# Patient Record
Sex: MALE | Race: BLACK OR AFRICAN AMERICAN | NOT HISPANIC OR LATINO | URBAN - METROPOLITAN AREA
[De-identification: names, ages, dates, MRNs, and addresses within clinical notes are randomized per-mention and may not be internally consistent; named-entity substitution may affect disease eponyms.]

---

## 2018-10-03 ENCOUNTER — EMERGENCY (EMERGENCY)
Facility: HOSPITAL | Age: 28
LOS: 1 days | Discharge: HOME | End: 2018-10-03
Attending: STUDENT IN AN ORGANIZED HEALTH CARE EDUCATION/TRAINING PROGRAM
Payer: COMMERCIAL

## 2018-10-03 VITALS
RESPIRATION RATE: 19 BRPM | SYSTOLIC BLOOD PRESSURE: 121 MMHG | TEMPERATURE: 97 F | HEART RATE: 79 BPM | OXYGEN SATURATION: 100 % | DIASTOLIC BLOOD PRESSURE: 74 MMHG

## 2018-10-03 VITALS
WEIGHT: 156.97 LBS | DIASTOLIC BLOOD PRESSURE: 73 MMHG | HEART RATE: 109 BPM | OXYGEN SATURATION: 100 % | HEIGHT: 71 IN | TEMPERATURE: 98 F | RESPIRATION RATE: 16 BRPM | SYSTOLIC BLOOD PRESSURE: 111 MMHG

## 2018-10-03 DIAGNOSIS — Z98.890 OTHER SPECIFIED POSTPROCEDURAL STATES: Chronic | ICD-10-CM

## 2018-10-03 PROBLEM — Z00.00 ENCOUNTER FOR PREVENTIVE HEALTH EXAMINATION: Status: ACTIVE | Noted: 2018-10-03

## 2018-10-03 LAB
HCT VFR BLD CALC: 37.3 % — LOW (ref 42–52)
HGB BLD-MCNC: 11.7 G/DL — LOW (ref 14–18)
MCHC RBC-ENTMCNC: 28.2 PG — SIGNIFICANT CHANGE UP (ref 27–31)
MCHC RBC-ENTMCNC: 31.4 G/DL — LOW (ref 32–36)
MCV RBC AUTO: 89.9 FL — SIGNIFICANT CHANGE UP (ref 80–94)
PLATELET # BLD AUTO: 277 K/UL — SIGNIFICANT CHANGE UP (ref 150–400)
RBC # BLD: 4.15 M/UL — LOW (ref 4.6–6.2)
RBC # FLD: 14.1 % — SIGNIFICANT CHANGE UP (ref 11–15.6)
WBC # BLD: 4.1 K/UL — LOW (ref 4.8–10.8)
WBC # FLD AUTO: 4.1 K/UL — LOW (ref 4.8–10.8)

## 2018-10-03 PROCEDURE — 80048 BASIC METABOLIC PNL TOTAL CA: CPT

## 2018-10-03 PROCEDURE — 96374 THER/PROPH/DIAG INJ IV PUSH: CPT

## 2018-10-03 PROCEDURE — 99284 EMERGENCY DEPT VISIT MOD MDM: CPT

## 2018-10-03 PROCEDURE — 70450 CT HEAD/BRAIN W/O DYE: CPT

## 2018-10-03 PROCEDURE — 70450 CT HEAD/BRAIN W/O DYE: CPT | Mod: 26

## 2018-10-03 PROCEDURE — 96375 TX/PRO/DX INJ NEW DRUG ADDON: CPT

## 2018-10-03 PROCEDURE — 85027 COMPLETE CBC AUTOMATED: CPT

## 2018-10-03 PROCEDURE — 36415 COLL VENOUS BLD VENIPUNCTURE: CPT

## 2018-10-03 PROCEDURE — 99284 EMERGENCY DEPT VISIT MOD MDM: CPT | Mod: 25

## 2018-10-03 RX ORDER — KETOROLAC TROMETHAMINE 30 MG/ML
30 SYRINGE (ML) INJECTION ONCE
Qty: 0 | Refills: 0 | Status: DISCONTINUED | OUTPATIENT
Start: 2018-10-03 | End: 2018-10-03

## 2018-10-03 RX ORDER — SODIUM CHLORIDE 9 MG/ML
1000 INJECTION INTRAMUSCULAR; INTRAVENOUS; SUBCUTANEOUS ONCE
Qty: 0 | Refills: 0 | Status: COMPLETED | OUTPATIENT
Start: 2018-10-03 | End: 2018-10-03

## 2018-10-03 RX ORDER — SODIUM CHLORIDE 9 MG/ML
3 INJECTION INTRAMUSCULAR; INTRAVENOUS; SUBCUTANEOUS ONCE
Qty: 0 | Refills: 0 | Status: COMPLETED | OUTPATIENT
Start: 2018-10-03 | End: 2018-10-03

## 2018-10-03 RX ORDER — METOCLOPRAMIDE HCL 10 MG
10 TABLET ORAL ONCE
Qty: 0 | Refills: 0 | Status: COMPLETED | OUTPATIENT
Start: 2018-10-03 | End: 2018-10-03

## 2018-10-03 RX ORDER — DIPHENHYDRAMINE HCL 50 MG
25 CAPSULE ORAL ONCE
Qty: 0 | Refills: 0 | Status: COMPLETED | OUTPATIENT
Start: 2018-10-03 | End: 2018-10-03

## 2018-10-03 RX ADMIN — Medication 10 MILLIGRAM(S): at 18:45

## 2018-10-03 RX ADMIN — SODIUM CHLORIDE 1000 MILLILITER(S): 9 INJECTION INTRAMUSCULAR; INTRAVENOUS; SUBCUTANEOUS at 18:45

## 2018-10-03 RX ADMIN — Medication 25 MILLIGRAM(S): at 13:05

## 2018-10-03 RX ADMIN — Medication 30 MILLIGRAM(S): at 19:03

## 2018-10-03 RX ADMIN — Medication 2 TABLET(S): at 19:30

## 2018-10-03 RX ADMIN — Medication 30 MILLIGRAM(S): at 13:05

## 2018-10-03 RX ADMIN — SODIUM CHLORIDE 3 MILLILITER(S): 9 INJECTION INTRAMUSCULAR; INTRAVENOUS; SUBCUTANEOUS at 13:05

## 2018-10-03 RX ADMIN — Medication 2 TABLET(S): at 18:45

## 2018-10-03 NOTE — ED ADULT TRIAGE NOTE - CHIEF COMPLAINT QUOTE
pt [place] train [place] reporting headache. Patient reports he fell 2 feet from bed last night. no loc. no anticoags. patient hx of guillan-barre.

## 2018-10-03 NOTE — ED ADULT NURSE REASSESSMENT NOTE - NS ED NURSE REASSESS COMMENT FT1
Assumed patient care from ANDREA Rudolph at 1930, charting as noted. Received patient lying in bed, a&ox3, able to make needs known. Iv fluids infusing well to left forearm iv, patent. Patient denies chest pain. sob, dizziness, headache, fever and chills. Plan of care and wait time explained, patient verbalized understanding. Patient not in respiratory distress. To continue to monitor.

## 2018-10-03 NOTE — ED PROVIDER NOTE - OBJECTIVE STATEMENT
CC PATIENT WITH HEADACHE.   27 YO MALE WITH HIV AND GUILLAIN BARRE HISTORY, FOLLOWED AT [facility], PRESENTS WITH CO HEADACHE. PT STATES HE GOT UP TO GO TO THE BATHROOM AT 2 AM AND FELL BECAUSE HIS LEGS WERE WEAK. HE HIT THE BED MATTRESS AND THEN THE FLOOR. NO LOC, N,V,VISUAL CHANGES. PT STATES HE STAYED AWAKE AND AT 5:30 AM GOT UP TO GET READY FOR WORK. PT STATES THAT ALTHOUGH HE DID NOT HAVE HEADACHE  WHEN HE FELL, HE HAD ONE LATER ON IN THE MORNING. HEADACHE CONSTANT AND FRONTAL. PT HAS NO HX HEADACHE. HE DESCRIBES PAIN AS"AN ICEPACK ON MY FOREHEAD" HE STATES HE WAS ON THE TRAIN AND "PASSED OUT" HE STATE "THE NATALIE NEXT TO ME THOUGHT I FELL ASLEEP BACUSE I JUST STARTED LEANING TOWARD THE SIDE" NO OTHER COMPLAINTS  MED HX HIV (human immunodeficiency virus infection)  from birth

## 2018-10-03 NOTE — ED PROVIDER NOTE - PROGRESS NOTE DETAILS
PT HAS HEADACHE WILL MEDICATE AND CHECK LABS. NEUROLOGICALLY INTACT.  CT NEGATIVE. WILL REEVALUATE PT MEDICATED. WILL REASSESS. WILL FOLLOW UP LABS PT seen and reassessed.  Patient symptomatically improved.   AAOX3, NAD, VSS.  Discussed test results w/ patient, given copy of results. Patient verbalized understanding of hospital course and outpatient plans, has decisional making capacity.  Will follow-up with Primary care doctor in the next 2 days; patient will call for an appointment. Will return to the ED if there is any worsening of symptoms.  Patient able to ambulate w/o difficulty, is tolerating PO intake

## 2018-10-03 NOTE — ED PROVIDER NOTE - PRINCIPAL DIAGNOSIS
Acute nonintractable headache, unspecified headache type Migraine without aura and without status migrainosus, not intractable

## 2018-10-03 NOTE — ED ADULT NURSE NOTE - OBJECTIVE STATEMENT
28 year old male in noacute distress, alert and oriented x 4 c/o headache upon waking up this morning. Pt reports falling onto his mattress last night hitting the back of his head. Pt states "It feels like a migraine", denies history of migraine, denies taking any medication for pain this morning.

## 2018-10-03 NOTE — ED PROVIDER NOTE - SHIFT CHANGE DETAILS
PT WITH HA MOST LIKELY MIGRAINE. ADMITTED TO [facility].  OFFERED LP AND ADAMANTLY REFUSED.  REASSESS AFTER IVF AND MEDS. SCRIPT SENT TO HIS PHARMACY. NEEDS WORK NOTE,

## 2018-10-03 NOTE — ED PROVIDER NOTE - CHPI ED SYMPTOMS NEG
no loss of consciousness/no nausea/no numbness/no blurred vision/no confusion/no dizziness/no fever/no change in level of consciousness/no vomiting

## 2018-10-03 NOTE — ED PROVIDER NOTE - CARE PLAN
Principal Discharge DX:	Acute nonintractable headache, unspecified headache type Principal Discharge DX:	Migraine without aura and without status migrainosus, not intractable

## 2018-10-03 NOTE — ED ADULT NURSE NOTE - NSIMPLEMENTINTERV_GEN_ALL_ED
Implemented All Fall Risk Interventions:  [place] to call system. Call bell, personal items and telephone within reach. Instruct patient to call for assistance. Room bathroom lighting operational. Non-slip footwear when patient is off stretcher. Physically safe environment: no spills, clutter or unnecessary equipment. Stretcher in lowest position, wheels locked, appropriate side rails in place. Provide visual cue, wrist band, yellow gown, etc. Monitor gait and stability. Monitor for mental status changes and reorient to person, place, and time. Review medications for side effects contributing to fall risk. Reinforce activity limits and safety measures with patient and family.

## 2018-10-03 NOTE — ED PROVIDER NOTE - MEDICAL DECISION MAKING DETAILS
PT WITH HEADACHE AFTER FALL WITHOUT LOC N V OR VISUAL CHANGES  EXAM NON FOCAL AND CT NEGATIVE  CHECKING LABS, WILL REASSESS

## 2018-10-12 ENCOUNTER — EMERGENCY (EMERGENCY)
Facility: HOSPITAL | Age: 28
LOS: 1 days | Discharge: HOME | End: 2018-10-12
Attending: EMERGENCY MEDICINE
Payer: COMMERCIAL

## 2018-10-12 VITALS
TEMPERATURE: 98 F | WEIGHT: 149.91 LBS | SYSTOLIC BLOOD PRESSURE: 129 MMHG | DIASTOLIC BLOOD PRESSURE: 87 MMHG | HEART RATE: 76 BPM | RESPIRATION RATE: 16 BRPM | OXYGEN SATURATION: 99 % | HEIGHT: 68 IN

## 2018-10-12 DIAGNOSIS — Z98.890 OTHER SPECIFIED POSTPROCEDURAL STATES: Chronic | ICD-10-CM

## 2018-10-12 PROCEDURE — 99285 EMERGENCY DEPT VISIT HI MDM: CPT | Mod: 25

## 2018-10-12 PROCEDURE — 93010 ELECTROCARDIOGRAM REPORT: CPT

## 2018-10-12 NOTE — ED ADULT TRIAGE NOTE - CHIEF COMPLAINT QUOTE
Pt picked up from train station with hx of Guillain [place] syndrome presents with c/o 6/10 sudden onset chest tightness that pt describes as "it feels like something is compressing my chest". Pain is nonradiaiting but constant.

## 2018-10-13 ENCOUNTER — EMERGENCY (EMERGENCY)
Facility: HOSPITAL | Age: 28
LOS: 0 days | Discharge: HOME | End: 2018-10-14
Attending: EMERGENCY MEDICINE | Admitting: EMERGENCY MEDICINE
Payer: MEDICAID

## 2018-10-13 VITALS
OXYGEN SATURATION: 100 % | HEART RATE: 81 BPM | TEMPERATURE: 98 F | WEIGHT: 156.97 LBS | DIASTOLIC BLOOD PRESSURE: 81 MMHG | HEIGHT: 71 IN | SYSTOLIC BLOOD PRESSURE: 130 MMHG | RESPIRATION RATE: 20 BRPM

## 2018-10-13 VITALS
HEART RATE: 60 BPM | RESPIRATION RATE: 18 BRPM | SYSTOLIC BLOOD PRESSURE: 123 MMHG | TEMPERATURE: 99 F | DIASTOLIC BLOOD PRESSURE: 68 MMHG | OXYGEN SATURATION: 98 %

## 2018-10-13 DIAGNOSIS — R51 HEADACHE: ICD-10-CM

## 2018-10-13 DIAGNOSIS — Z21 ASYMPTOMATIC HUMAN IMMUNODEFICIENCY VIRUS [HIV] INFECTION STATUS: ICD-10-CM

## 2018-10-13 DIAGNOSIS — G61.0 GUILLAIN-BARRE SYNDROME: ICD-10-CM

## 2018-10-13 DIAGNOSIS — Z98.890 OTHER SPECIFIED POSTPROCEDURAL STATES: Chronic | ICD-10-CM

## 2018-10-13 PROBLEM — B20 HUMAN IMMUNODEFICIENCY VIRUS [HIV] DISEASE: Chronic | Status: ACTIVE | Noted: 2018-10-03

## 2018-10-13 LAB
ALBUMIN SERPL ELPH-MCNC: 3.9 G/DL — SIGNIFICANT CHANGE UP (ref 3.3–5.2)
ALP SERPL-CCNC: 74 U/L — SIGNIFICANT CHANGE UP (ref 40–120)
ALT FLD-CCNC: 11 U/L — SIGNIFICANT CHANGE UP
ANION GAP SERPL CALC-SCNC: 14 MMOL/L — SIGNIFICANT CHANGE UP (ref 5–17)
ANISOCYTOSIS BLD QL: SLIGHT — SIGNIFICANT CHANGE UP
AST SERPL-CCNC: 23 U/L — SIGNIFICANT CHANGE UP
BILIRUB SERPL-MCNC: 0.3 MG/DL — LOW (ref 0.4–2)
BUN SERPL-MCNC: 14 MG/DL — SIGNIFICANT CHANGE UP (ref 8–20)
CALCIUM SERPL-MCNC: 8.8 MG/DL — SIGNIFICANT CHANGE UP (ref 8.6–10.2)
CHLORIDE SERPL-SCNC: 99 MMOL/L — SIGNIFICANT CHANGE UP (ref 98–107)
CO2 SERPL-SCNC: 24 MMOL/L — SIGNIFICANT CHANGE UP (ref 22–29)
CREAT SERPL-MCNC: 0.65 MG/DL — SIGNIFICANT CHANGE UP (ref 0.5–1.3)
D DIMER BLD IA.RAPID-MCNC: 166 NG/ML DDU — SIGNIFICANT CHANGE UP
ELLIPTOCYTES BLD QL SMEAR: SLIGHT — SIGNIFICANT CHANGE UP
EOSINOPHIL NFR BLD AUTO: 2 % — SIGNIFICANT CHANGE UP (ref 0–6)
GLUCOSE SERPL-MCNC: 101 MG/DL — SIGNIFICANT CHANGE UP (ref 70–115)
HCT VFR BLD CALC: 36.1 % — LOW (ref 42–52)
HGB BLD-MCNC: 11.6 G/DL — LOW (ref 14–18)
HYPOCHROMIA BLD QL: SLIGHT — SIGNIFICANT CHANGE UP
LYMPHOCYTES # BLD AUTO: 59 % — HIGH (ref 20–55)
MACROCYTES BLD QL: SLIGHT — SIGNIFICANT CHANGE UP
MCHC RBC-ENTMCNC: 29.1 PG — SIGNIFICANT CHANGE UP (ref 27–31)
MCHC RBC-ENTMCNC: 32.1 G/DL — SIGNIFICANT CHANGE UP (ref 32–36)
MCV RBC AUTO: 90.5 FL — SIGNIFICANT CHANGE UP (ref 80–94)
MICROCYTES BLD QL: SLIGHT — SIGNIFICANT CHANGE UP
MONOCYTES NFR BLD AUTO: 10 % — SIGNIFICANT CHANGE UP (ref 3–10)
NEUTROPHILS NFR BLD AUTO: 27 % — LOW (ref 37–73)
PLAT MORPH BLD: NORMAL — SIGNIFICANT CHANGE UP
PLATELET # BLD AUTO: 212 K/UL — SIGNIFICANT CHANGE UP (ref 150–400)
POIKILOCYTOSIS BLD QL AUTO: SLIGHT — SIGNIFICANT CHANGE UP
POLYCHROMASIA BLD QL SMEAR: SLIGHT — SIGNIFICANT CHANGE UP
POTASSIUM SERPL-MCNC: 3.6 MMOL/L — SIGNIFICANT CHANGE UP (ref 3.5–5.3)
POTASSIUM SERPL-SCNC: 3.6 MMOL/L — SIGNIFICANT CHANGE UP (ref 3.5–5.3)
PROT SERPL-MCNC: 8.4 G/DL — SIGNIFICANT CHANGE UP (ref 6.6–8.7)
RBC # BLD: 3.99 M/UL — LOW (ref 4.6–6.2)
RBC # FLD: 13.7 % — SIGNIFICANT CHANGE UP (ref 11–15.6)
RBC BLD AUTO: ABNORMAL
SODIUM SERPL-SCNC: 137 MMOL/L — SIGNIFICANT CHANGE UP (ref 135–145)
TROPONIN T SERPL-MCNC: <0.01 NG/ML — SIGNIFICANT CHANGE UP (ref 0–0.06)
TROPONIN T SERPL-MCNC: <0.01 NG/ML — SIGNIFICANT CHANGE UP (ref 0–0.06)
VARIANT LYMPHS # BLD: 2 % — SIGNIFICANT CHANGE UP (ref 0–6)
WBC # BLD: 3.1 K/UL — LOW (ref 4.8–10.8)
WBC # FLD AUTO: 3.1 K/UL — LOW (ref 4.8–10.8)

## 2018-10-13 PROCEDURE — 80053 COMPREHEN METABOLIC PANEL: CPT

## 2018-10-13 PROCEDURE — 71046 X-RAY EXAM CHEST 2 VIEWS: CPT | Mod: 26

## 2018-10-13 PROCEDURE — 85027 COMPLETE CBC AUTOMATED: CPT

## 2018-10-13 PROCEDURE — 99053 MED SERV 10PM-8AM 24 HR FAC: CPT

## 2018-10-13 PROCEDURE — 93005 ELECTROCARDIOGRAM TRACING: CPT

## 2018-10-13 PROCEDURE — 85379 FIBRIN DEGRADATION QUANT: CPT

## 2018-10-13 PROCEDURE — 99285 EMERGENCY DEPT VISIT HI MDM: CPT | Mod: 25

## 2018-10-13 PROCEDURE — 71046 X-RAY EXAM CHEST 2 VIEWS: CPT

## 2018-10-13 PROCEDURE — 36415 COLL VENOUS BLD VENIPUNCTURE: CPT

## 2018-10-13 PROCEDURE — 84484 ASSAY OF TROPONIN QUANT: CPT

## 2018-10-13 PROCEDURE — 99284 EMERGENCY DEPT VISIT MOD MDM: CPT | Mod: 25

## 2018-10-13 RX ORDER — METOCLOPRAMIDE HCL 10 MG
10 TABLET ORAL ONCE
Qty: 0 | Refills: 0 | Status: COMPLETED | OUTPATIENT
Start: 2018-10-13 | End: 2018-10-13

## 2018-10-13 RX ORDER — MULTIVIT WITH MIN/MFOLATE/K2 340-15/3 G
0 POWDER (GRAM) ORAL
Qty: 0 | Refills: 0 | COMMUNITY

## 2018-10-13 RX ORDER — ASPIRIN/CALCIUM CARB/MAGNESIUM 324 MG
162 TABLET ORAL ONCE
Qty: 0 | Refills: 0 | Status: COMPLETED | OUTPATIENT
Start: 2018-10-13 | End: 2018-10-13

## 2018-10-13 RX ORDER — POTASSIUM CHLORIDE 20 MEQ
0 PACKET (EA) ORAL
Qty: 0 | Refills: 0 | COMMUNITY

## 2018-10-13 RX ORDER — DIPHENHYDRAMINE HCL 50 MG
25 CAPSULE ORAL ONCE
Qty: 0 | Refills: 0 | Status: COMPLETED | OUTPATIENT
Start: 2018-10-13 | End: 2018-10-13

## 2018-10-13 RX ORDER — GABAPENTIN 400 MG/1
600 CAPSULE ORAL ONCE
Qty: 0 | Refills: 0 | Status: COMPLETED | OUTPATIENT
Start: 2018-10-13 | End: 2018-10-13

## 2018-10-13 RX ORDER — ACETAMINOPHEN 500 MG
1000 TABLET ORAL ONCE
Qty: 0 | Refills: 0 | Status: COMPLETED | OUTPATIENT
Start: 2018-10-13 | End: 2018-10-13

## 2018-10-13 RX ORDER — SODIUM CHLORIDE 9 MG/ML
2000 INJECTION INTRAMUSCULAR; INTRAVENOUS; SUBCUTANEOUS ONCE
Qty: 0 | Refills: 0 | Status: COMPLETED | OUTPATIENT
Start: 2018-10-13 | End: 2018-10-13

## 2018-10-13 RX ADMIN — Medication 162 MILLIGRAM(S): at 06:00

## 2018-10-13 RX ADMIN — Medication 10 MILLIGRAM(S): at 22:26

## 2018-10-13 RX ADMIN — Medication 1000 MILLIGRAM(S): at 22:45

## 2018-10-13 RX ADMIN — SODIUM CHLORIDE 1000 MILLILITER(S): 9 INJECTION INTRAMUSCULAR; INTRAVENOUS; SUBCUTANEOUS at 22:26

## 2018-10-13 RX ADMIN — Medication 25 MILLIGRAM(S): at 22:26

## 2018-10-13 RX ADMIN — Medication 1000 MILLIGRAM(S): at 23:00

## 2018-10-13 RX ADMIN — Medication 400 MILLIGRAM(S): at 22:27

## 2018-10-13 RX ADMIN — GABAPENTIN 600 MILLIGRAM(S): 400 CAPSULE ORAL at 06:00

## 2018-10-13 NOTE — ED PROVIDER NOTE - OBJECTIVE STATEMENT
27 y/o M with PMHx of Guillain [place] (on 400 mg of Gabapentin) and HIV presents to ED c/o chest pain described as a pressure onset today. The pain is constant, rates the pain as a 6/10 and has pain with deep inspiration. Is also experiencing bilateral leg pain, which is chronic but has worsened in intensity. He also notes having a headache that has been constant since his lumbar puncture 2 weeks ago. Denies fevers, chills, cough, abdominal pain, nausea, vomiting, shortness of breath, diaphoresis or back pain. Was in the ED several weeks ago but never followed up outpatient as he was instructed to do. Did not take aspirin PTA. No further acute complaints at this time.

## 2018-10-13 NOTE — ED PROVIDER NOTE - PROGRESS NOTE DETAILS
JG:  Received signout to discharge patient when he is awake and if feeling better.  Pt. to f/u with [facility] as this was the service who performed LP.

## 2018-10-13 NOTE — ED PROVIDER NOTE - CHPI ED SYMPTOMS NEG
no vomiting/no chills/no back pain/no cough/no fever/no nausea/abdominal pain/no shortness of breath/no diaphoresis

## 2018-10-13 NOTE — ED ADULT NURSE NOTE - CHPI ED NUR SYMPTOMS NEG
no shortness of breath/no diaphoresis/no dizziness/no chills/no fever/no congestion/no nausea/no vomiting/no syncope/no back pain

## 2018-10-13 NOTE — ED PROVIDER NOTE - PLAN OF CARE
1. Return to ED for worsening, progressive or any other concerning symptoms   2. Follow up with your primary care doctor in 2-3days   3. Follow up with [facility]

## 2018-10-13 NOTE — ED ADULT NURSE NOTE - OBJECTIVE STATEMENT
Patient present with headache X2 weeks. Patient had lumbar puncture for hx gullain barre syndrome and states he began experiencing these headaches 1 day post op. Patient rates pain 9/10.

## 2018-10-13 NOTE — ED ADULT NURSE REASSESSMENT NOTE - NS ED NURSE REASSESS COMMENT FT1
Pt requesting medicate taxi , transportation arranged by beaver clerk , pt instructed to wait in the waiting room for , pt ambulating well in ed , no distress noted, no further complaints of chest pain
pt resting in bed, states feels some relief of pain. pt pending rpt trop. updated on plan of care, verbalizes understanding. call bell in reach
Pt received in the stretcher resting comfortably, non distress noted, no chest pain reported, awaiting further dispo

## 2018-10-13 NOTE — ED PROVIDER NOTE - NSFOLLOWUPINSTRUCTIONS_ED_ALL_ED_FT
1. Return to ED for worsening, progressive or any other concerning symptoms   2. Follow up with your primary care doctor in 2-3days   3. Follow up with [facility]  [facility]  Cardiology  [address] [place] [address], [place] [address]  Phone: [phone]  Fax:

## 2018-10-13 NOTE — ED PROVIDER NOTE - MEDICAL DECISION MAKING DETAILS
29 y/o M with PMHx of Guillain [place] (on 400 mg of Gabapentin) and HIV presents to ED c/o chest pain described as a pressure onset today. 29 y/o M with PMHx of Guillain [place] (on 400 mg of Gabapentin) and HIV presents to ED c/o chest pain described as a pressure onset today - neg trop x2, pt low risk for MACE - follow up with PMD and cards

## 2018-10-13 NOTE — ED PROVIDER NOTE - CARE PLAN
Principal Discharge DX:	Chest pain in adult  Assessment and plan of treatment:	1. Return to ED for worsening, progressive or any other concerning symptoms   2. Follow up with your primary care doctor in 2-3days   3. Follow up with [facility]

## 2018-10-13 NOTE — ED ADULT NURSE NOTE - CHIEF COMPLAINT QUOTE
Pt [place] from train station with hx of Guillain [place] syndrome presents with c/o 6/10 sudden onset chest tightness that pt describes as "it feels like something is compressing my chest". Pain is nonradiaiting but constant.

## 2018-10-13 NOTE — ED PROVIDER NOTE - PROGRESS NOTE DETAILS
Pt signed out as nonspecific chest pain, no cardiac risk factors. EKG wnl, trop x2 negative. will d/c -Slowey DO

## 2018-10-13 NOTE — ED ADULT NURSE REASSESSMENT NOTE - COMFORT CARE
repositioned/plan of care explained/treatment delay explained/wait time explained/warm blanket provided/side rails down

## 2018-10-13 NOTE — ED PROVIDER NOTE - NSFOLLOWUPCLINICS_GEN_ALL_ED_FT
[facility]  Cardiology  [address] [place] [address]  Phone: [phone]  Fax:   Follow Up Time:

## 2018-10-13 NOTE — ED ADULT NURSE NOTE - OBJECTIVE STATEMENT
29yo male c/o left sided "chest pressure" that started around 930pm 10/12/18 when he was on train. pt denies radiation of pain, numbness, tingling, sob, dyspnea, respiratory distress. pt appears comfortable, asleep in bed, easily arrousable. pt states he is hiv + and has guillian barre.

## 2018-10-13 NOTE — ED PROVIDER NOTE - OBJECTIVE STATEMENT
29 y/o M with recent dx of Guillain-[place]enn[place] Syndrome presenting to the ED c/o frontal HA x2 weeks. Pt had a Lumbar Puncture performed two weeks ago at [place], dx with Guillain-Jennings syndrome. Started on Gabapentin. Since LP was performed, he has been experiencing a HA. States that he stayed overnight at [facility] one week ago, informed he may need a blood patch. Pt was also reevaluated at [place] and [place] last week. In ED today, endorses improving, mild residual LE weakness secondary to Guillain-Jennings. Denies any CP, abd pain, N/V/D, fevers, or chills. Pt has follow up with Neurology in two weeks. Allergic to Ceclor.

## 2018-10-14 VITALS
HEART RATE: 58 BPM | DIASTOLIC BLOOD PRESSURE: 98 MMHG | SYSTOLIC BLOOD PRESSURE: 126 MMHG | RESPIRATION RATE: 18 BRPM | OXYGEN SATURATION: 96 %

## 2018-10-15 ENCOUNTER — EMERGENCY (EMERGENCY)
Facility: HOSPITAL | Age: 28
LOS: 1 days | Discharge: HOME | End: 2018-10-15
Attending: STUDENT IN AN ORGANIZED HEALTH CARE EDUCATION/TRAINING PROGRAM
Payer: COMMERCIAL

## 2018-10-15 VITALS
RESPIRATION RATE: 18 BRPM | DIASTOLIC BLOOD PRESSURE: 83 MMHG | OXYGEN SATURATION: 98 % | HEIGHT: 70 IN | WEIGHT: 160.06 LBS | SYSTOLIC BLOOD PRESSURE: 137 MMHG | HEART RATE: 96 BPM | TEMPERATURE: 98 F

## 2018-10-15 DIAGNOSIS — Z98.890 OTHER SPECIFIED POSTPROCEDURAL STATES: Chronic | ICD-10-CM

## 2018-10-15 LAB
ALBUMIN SERPL ELPH-MCNC: 3.4 G/DL — SIGNIFICANT CHANGE UP (ref 3.3–5.2)
ALP SERPL-CCNC: 65 U/L — SIGNIFICANT CHANGE UP (ref 40–120)
ALT FLD-CCNC: 13 U/L — SIGNIFICANT CHANGE UP
ANION GAP SERPL CALC-SCNC: 11 MMOL/L — SIGNIFICANT CHANGE UP (ref 5–17)
AST SERPL-CCNC: 24 U/L — SIGNIFICANT CHANGE UP
BILIRUB SERPL-MCNC: <0.2 MG/DL — LOW (ref 0.4–2)
BUN SERPL-MCNC: 11 MG/DL — SIGNIFICANT CHANGE UP (ref 8–20)
CALCIUM SERPL-MCNC: 8.4 MG/DL — LOW (ref 8.6–10.2)
CHLORIDE SERPL-SCNC: 109 MMOL/L — HIGH (ref 98–107)
CO2 SERPL-SCNC: 24 MMOL/L — SIGNIFICANT CHANGE UP (ref 22–29)
CREAT SERPL-MCNC: 1.09 MG/DL — SIGNIFICANT CHANGE UP (ref 0.5–1.3)
D DIMER BLD IA.RAPID-MCNC: <150 NG/ML DDU — SIGNIFICANT CHANGE UP
GLUCOSE SERPL-MCNC: 108 MG/DL — SIGNIFICANT CHANGE UP (ref 70–115)
HCT VFR BLD CALC: 34.1 % — LOW (ref 42–52)
HGB BLD-MCNC: 10.8 G/DL — LOW (ref 14–18)
LIDOCAIN IGE QN: 32 U/L — SIGNIFICANT CHANGE UP (ref 22–51)
MCHC RBC-ENTMCNC: 29.2 PG — SIGNIFICANT CHANGE UP (ref 27–31)
MCHC RBC-ENTMCNC: 31.7 G/DL — LOW (ref 32–36)
MCV RBC AUTO: 92.2 FL — SIGNIFICANT CHANGE UP (ref 80–94)
PLATELET # BLD AUTO: 191 K/UL — SIGNIFICANT CHANGE UP (ref 150–400)
POTASSIUM SERPL-MCNC: 3.7 MMOL/L — SIGNIFICANT CHANGE UP (ref 3.5–5.3)
POTASSIUM SERPL-SCNC: 3.7 MMOL/L — SIGNIFICANT CHANGE UP (ref 3.5–5.3)
PROT SERPL-MCNC: 7.7 G/DL — SIGNIFICANT CHANGE UP (ref 6.6–8.7)
RBC # BLD: 3.7 M/UL — LOW (ref 4.6–6.2)
RBC # FLD: 13.9 % — SIGNIFICANT CHANGE UP (ref 11–15.6)
SODIUM SERPL-SCNC: 144 MMOL/L — SIGNIFICANT CHANGE UP (ref 135–145)
TROPONIN T SERPL-MCNC: <0.01 NG/ML — SIGNIFICANT CHANGE UP (ref 0–0.06)
WBC # BLD: 3 K/UL — LOW (ref 4.8–10.8)
WBC # FLD AUTO: 3 K/UL — LOW (ref 4.8–10.8)

## 2018-10-15 PROCEDURE — 84484 ASSAY OF TROPONIN QUANT: CPT

## 2018-10-15 PROCEDURE — 36415 COLL VENOUS BLD VENIPUNCTURE: CPT

## 2018-10-15 PROCEDURE — 85379 FIBRIN DEGRADATION QUANT: CPT

## 2018-10-15 PROCEDURE — 85027 COMPLETE CBC AUTOMATED: CPT

## 2018-10-15 PROCEDURE — 83690 ASSAY OF LIPASE: CPT

## 2018-10-15 PROCEDURE — 99284 EMERGENCY DEPT VISIT MOD MDM: CPT | Mod: 25

## 2018-10-15 PROCEDURE — 80053 COMPREHEN METABOLIC PANEL: CPT

## 2018-10-15 RX ORDER — IBUPROFEN 200 MG
600 TABLET ORAL ONCE
Qty: 0 | Refills: 0 | Status: COMPLETED | OUTPATIENT
Start: 2018-10-15 | End: 2018-10-15

## 2018-10-15 RX ADMIN — Medication 600 MILLIGRAM(S): at 03:35

## 2018-10-15 NOTE — ED PROVIDER NOTE - CARDIAC, MLM
Diffuse Chest wall tenderness that may exacerbate his pain. Normal rate, regular rhythm.  Heart sounds S1, S2.  No murmurs, rubs or gallops. Normal rate, regular rhythm.  Heart sounds S1, S2.  No murmurs, rubs or gallops. Diffuse chest wall tenderness which reproduces pain.

## 2018-10-15 NOTE — ED PROVIDER NOTE - MEDICAL DECISION MAKING DETAILS
Will evaluate pt for chest pain but given pleuritic nature will obtain labs, and re-evaluate. Will evaluate pt for chest pain, likely MSK, but given pleuritic nature will obtain labs, and re-evaluate.

## 2018-10-15 NOTE — ED ADULT NURSE NOTE - OBJECTIVE STATEMENT
Pt c/o left sided CP starting at 1030 pm, denies radiation of pain, denies n/v, resp even and unlabored, hx of HIV and GB, AOx4, denies cardiac hx, states he was just here in the ED for his legs

## 2018-10-15 NOTE — ED ADULT NURSE NOTE - NSIMPLEMENTINTERV_GEN_ALL_ED
Implemented All Universal Safety Interventions:  [place] to call system. Call bell, personal items and telephone within reach. Instruct patient to call for assistance. Room bathroom lighting operational. Non-slip footwear when patient is off stretcher. Physically safe environment: no spills, clutter or unnecessary equipment. Stretcher in lowest position, wheels locked, appropriate side rails in place.

## 2018-10-15 NOTE — ED PROVIDER NOTE - PROGRESS NOTE DETAILS
Patient feeling better. labs are as noted. Patient with musculoskeletal pain. Patient feeling better. labs are as noted and similar to previous hospital visit. Patient with musculoskeletal pain.

## 2018-10-15 NOTE — ED PROVIDER NOTE - OBJECTIVE STATEMENT
29 y/o M pt presents to the ED with hx of HIV c/o of upper CP. Pt reports the pain began on the train at 10:30am after work. He describes the CP as someone sitting on his chest. The pain worsens with deep breaths and movement. Pt reports pain like this before when he has had PNA, reports sneezing, and diarrhea. Denies nausea and vomiting. No further acute complaints at this time. 29 y/o M pt presents to the ED with hx of HIV c/o of upper CP. Pt reports the pain began on the train at 10:30am after work. He describes the CP as someone sitting on his chest. The pain worsens with deep breaths and movement. Pt reports pain like this before when he has had PNA, reports sneezing, and diarrhea. Pt is a current smoker who smokes 4-5 cigarettes a day. Denies nausea, vomiting, and FHx of heart disease.  No further acute complaints at this time. 27 y/o M pt presents to the ED with PMHx of HIV and Guillain [place] c/o upper chest pain, described as a heaviness onset 1030 yesterday, while at rest on the train home from work. He is also experiencing sneezing and diarrhea. Notes the pain is worse with deep breaths and movements. He notes having pain similar to this in the past and was diagnosed with pneumonia. Admits to smoking (about 4-5 cigarettes per day). Denies fevers, chills, nausea, vomiting, shortness of breath, diaphoresis, personal hx of heart disease or family hx of cardiac problems. Was evaluated in the ED a few days ago, for similar symptoms but was discharged home after a negative workup, instructed to have cardiology follow-up outpatient. No further acute complaints at this time.

## 2018-11-01 ENCOUNTER — EMERGENCY (EMERGENCY)
Facility: HOSPITAL | Age: 28
LOS: 1 days | Discharge: HOME | End: 2018-11-01
Attending: STUDENT IN AN ORGANIZED HEALTH CARE EDUCATION/TRAINING PROGRAM
Payer: COMMERCIAL

## 2018-11-01 ENCOUNTER — EMERGENCY (EMERGENCY)
Facility: HOSPITAL | Age: 28
LOS: 0 days | Discharge: HOME | End: 2018-11-01
Attending: EMERGENCY MEDICINE | Admitting: EMERGENCY MEDICINE
Payer: MEDICAID

## 2018-11-01 VITALS
TEMPERATURE: 98 F | SYSTOLIC BLOOD PRESSURE: 115 MMHG | HEART RATE: 89 BPM | OXYGEN SATURATION: 98 % | RESPIRATION RATE: 18 BRPM | DIASTOLIC BLOOD PRESSURE: 79 MMHG | HEIGHT: 71 IN | WEIGHT: 164.91 LBS

## 2018-11-01 VITALS
HEART RATE: 72 BPM | RESPIRATION RATE: 18 BRPM | OXYGEN SATURATION: 100 % | TEMPERATURE: 98 F | DIASTOLIC BLOOD PRESSURE: 81 MMHG | SYSTOLIC BLOOD PRESSURE: 130 MMHG

## 2018-11-01 VITALS
HEART RATE: 80 BPM | WEIGHT: 160.06 LBS | DIASTOLIC BLOOD PRESSURE: 74 MMHG | OXYGEN SATURATION: 100 % | TEMPERATURE: 98 F | RESPIRATION RATE: 22 BRPM | SYSTOLIC BLOOD PRESSURE: 130 MMHG | HEIGHT: 70 IN

## 2018-11-01 DIAGNOSIS — Z98.890 OTHER SPECIFIED POSTPROCEDURAL STATES: Chronic | ICD-10-CM

## 2018-11-01 PROCEDURE — 99283 EMERGENCY DEPT VISIT LOW MDM: CPT | Mod: 25

## 2018-11-01 PROCEDURE — 71046 X-RAY EXAM CHEST 2 VIEWS: CPT | Mod: 26

## 2018-11-01 PROCEDURE — 99284 EMERGENCY DEPT VISIT MOD MDM: CPT | Mod: 25

## 2018-11-01 PROCEDURE — 70450 CT HEAD/BRAIN W/O DYE: CPT | Mod: 26

## 2018-11-01 RX ORDER — KETOROLAC TROMETHAMINE 30 MG/ML
30 SYRINGE (ML) INJECTION ONCE
Qty: 0 | Refills: 0 | Status: DISCONTINUED | OUTPATIENT
Start: 2018-11-01 | End: 2018-11-01

## 2018-11-01 RX ADMIN — Medication 30 MILLIGRAM(S): at 03:26

## 2018-11-01 RX ADMIN — Medication 30 MILLIGRAM(S): at 04:03
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
## 2018-11-01 NOTE — ED ADULT TRIAGE NOTE - CHIEF COMPLAINT QUOTE
recently got treated for pneumonia , my right ear hurts I feel like the glands in my neck are swollen , I feel like I cant breathe  I feel dizzy my stomach hurts and I had diarrhea x 2 weeks, wet cough noted upon arrival
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
## 2018-11-01 NOTE — ED ADULT NURSE NOTE - NSIMPLEMENTINTERV_GEN_ALL_ED
Implemented All Universal Safety Interventions:  [place] to call system. Call bell, personal items and telephone within reach. Instruct patient to call for assistance. Room bathroom lighting operational. Non-slip footwear when patient is off stretcher. Physically safe environment: no spills, clutter or unnecessary equipment. Stretcher in lowest position, wheels locked, appropriate side rails in place.

## 2018-11-01 NOTE — ED PROVIDER NOTE - PMH
Nini BarrSANGITA© syndrome    Guillain-[place]    HIV (human immunodeficiency virus infection)  from birth

## 2018-11-01 NOTE — ED PROVIDER NOTE - MEDICAL DECISION MAKING DETAILS
chronic migraine, worsened by an injury tonight.  will Ct.  chronic cough, will xray in light of pt HIV status.

## 2018-11-01 NOTE — ED ADULT NURSE NOTE - PMH
Nini BarrSANGITA© syndrome    Guillain-[place]    HIV (human immunodeficiency virus infection)  from birth

## 2018-11-02 VITALS
DIASTOLIC BLOOD PRESSURE: 86 MMHG | HEART RATE: 79 BPM | OXYGEN SATURATION: 98 % | RESPIRATION RATE: 18 BRPM | SYSTOLIC BLOOD PRESSURE: 133 MMHG | TEMPERATURE: 97 F

## 2018-11-02 DIAGNOSIS — B20 HUMAN IMMUNODEFICIENCY VIRUS [HIV] DISEASE: ICD-10-CM

## 2018-11-02 DIAGNOSIS — G43.909 MIGRAINE, UNSPECIFIED, NOT INTRACTABLE, WITHOUT STATUS MIGRAINOSUS: ICD-10-CM

## 2018-11-02 DIAGNOSIS — G61.0 GUILLAIN-BARRE SYNDROME: ICD-10-CM

## 2018-11-02 DIAGNOSIS — R51 HEADACHE: ICD-10-CM

## 2018-11-02 LAB
ALBUMIN SERPL ELPH-MCNC: 3.4 G/DL — SIGNIFICANT CHANGE UP (ref 3.3–5.2)
ALP SERPL-CCNC: 69 U/L — SIGNIFICANT CHANGE UP (ref 40–120)
ALT FLD-CCNC: 16 U/L — SIGNIFICANT CHANGE UP
ANION GAP SERPL CALC-SCNC: 11 MMOL/L — SIGNIFICANT CHANGE UP (ref 5–17)
ANISOCYTOSIS BLD QL: SLIGHT — SIGNIFICANT CHANGE UP
AST SERPL-CCNC: 26 U/L — SIGNIFICANT CHANGE UP
BASOPHILS # BLD AUTO: 0 K/UL — SIGNIFICANT CHANGE UP (ref 0–0.2)
BASOPHILS NFR BLD AUTO: 0 % — SIGNIFICANT CHANGE UP (ref 0–2)
BILIRUB SERPL-MCNC: 0.3 MG/DL — LOW (ref 0.4–2)
BUN SERPL-MCNC: 13 MG/DL — SIGNIFICANT CHANGE UP (ref 8–20)
CALCIUM SERPL-MCNC: 8.7 MG/DL — SIGNIFICANT CHANGE UP (ref 8.6–10.2)
CHLORIDE SERPL-SCNC: 103 MMOL/L — SIGNIFICANT CHANGE UP (ref 98–107)
CO2 SERPL-SCNC: 23 MMOL/L — SIGNIFICANT CHANGE UP (ref 22–29)
CREAT SERPL-MCNC: 0.69 MG/DL — SIGNIFICANT CHANGE UP (ref 0.5–1.3)
DACRYOCYTES BLD QL SMEAR: SLIGHT — SIGNIFICANT CHANGE UP
ELLIPTOCYTES BLD QL SMEAR: SLIGHT — SIGNIFICANT CHANGE UP
EOSINOPHIL # BLD AUTO: 0.2 K/UL — SIGNIFICANT CHANGE UP (ref 0–0.5)
EOSINOPHIL NFR BLD AUTO: 5 % — SIGNIFICANT CHANGE UP (ref 0–6)
GLUCOSE SERPL-MCNC: 96 MG/DL — SIGNIFICANT CHANGE UP (ref 70–115)
HCT VFR BLD CALC: 34.5 % — LOW (ref 42–52)
HGB BLD-MCNC: 11.3 G/DL — LOW (ref 14–18)
LYMPHOCYTES # BLD AUTO: 2.2 K/UL — SIGNIFICANT CHANGE UP (ref 1–4.8)
LYMPHOCYTES # BLD AUTO: 50 % — SIGNIFICANT CHANGE UP (ref 20–55)
MCHC RBC-ENTMCNC: 29.8 PG — SIGNIFICANT CHANGE UP (ref 27–31)
MCHC RBC-ENTMCNC: 32.8 G/DL — SIGNIFICANT CHANGE UP (ref 32–36)
MCV RBC AUTO: 91 FL — SIGNIFICANT CHANGE UP (ref 80–94)
MONOCYTES # BLD AUTO: 0.6 K/UL — SIGNIFICANT CHANGE UP (ref 0–0.8)
MONOCYTES NFR BLD AUTO: 14 % — HIGH (ref 3–10)
NEUTROPHILS # BLD AUTO: 1 K/UL — LOW (ref 1.8–8)
NEUTROPHILS NFR BLD AUTO: 29 % — LOW (ref 37–73)
PLAT MORPH BLD: NORMAL — SIGNIFICANT CHANGE UP
PLATELET # BLD AUTO: 230 K/UL — SIGNIFICANT CHANGE UP (ref 150–400)
POIKILOCYTOSIS BLD QL AUTO: SLIGHT — SIGNIFICANT CHANGE UP
POTASSIUM SERPL-MCNC: 4 MMOL/L — SIGNIFICANT CHANGE UP (ref 3.5–5.3)
POTASSIUM SERPL-SCNC: 4 MMOL/L — SIGNIFICANT CHANGE UP (ref 3.5–5.3)
PROT SERPL-MCNC: 7.5 G/DL — SIGNIFICANT CHANGE UP (ref 6.6–8.7)
RBC # BLD: 3.79 M/UL — LOW (ref 4.6–6.2)
RBC # FLD: 13.8 % — SIGNIFICANT CHANGE UP (ref 11–15.6)
RBC BLD AUTO: ABNORMAL
SCHISTOCYTES BLD QL AUTO: SLIGHT — SIGNIFICANT CHANGE UP
SODIUM SERPL-SCNC: 137 MMOL/L — SIGNIFICANT CHANGE UP (ref 135–145)
VARIANT LYMPHS # BLD: 2 % — SIGNIFICANT CHANGE UP (ref 0–6)
WBC # BLD: 3.9 K/UL — LOW (ref 4.8–10.8)
WBC # FLD AUTO: 3.9 K/UL — LOW (ref 4.8–10.8)

## 2018-11-02 PROCEDURE — 96374 THER/PROPH/DIAG INJ IV PUSH: CPT

## 2018-11-02 PROCEDURE — 99284 EMERGENCY DEPT VISIT MOD MDM: CPT | Mod: 25

## 2018-11-02 PROCEDURE — 80053 COMPREHEN METABOLIC PANEL: CPT

## 2018-11-02 PROCEDURE — 96375 TX/PRO/DX INJ NEW DRUG ADDON: CPT

## 2018-11-02 PROCEDURE — 85027 COMPLETE CBC AUTOMATED: CPT

## 2018-11-02 PROCEDURE — 36415 COLL VENOUS BLD VENIPUNCTURE: CPT

## 2018-11-02 RX ORDER — SODIUM CHLORIDE 9 MG/ML
1000 INJECTION INTRAMUSCULAR; INTRAVENOUS; SUBCUTANEOUS ONCE
Qty: 0 | Refills: 0 | Status: COMPLETED | OUTPATIENT
Start: 2018-11-02 | End: 2018-11-02

## 2018-11-02 RX ORDER — DIPHENHYDRAMINE HCL 50 MG
1 CAPSULE ORAL
Qty: 9 | Refills: 0
Start: 2018-11-02 | End: 2018-11-04

## 2018-11-02 RX ORDER — METOCLOPRAMIDE HCL 10 MG
10 TABLET ORAL ONCE
Qty: 0 | Refills: 0 | Status: COMPLETED | OUTPATIENT
Start: 2018-11-02 | End: 2018-11-02

## 2018-11-02 RX ORDER — ACETAMINOPHEN 500 MG
650 TABLET ORAL ONCE
Qty: 0 | Refills: 0 | Status: COMPLETED | OUTPATIENT
Start: 2018-11-02 | End: 2018-11-02

## 2018-11-02 RX ORDER — KETOROLAC TROMETHAMINE 30 MG/ML
15 SYRINGE (ML) INJECTION ONCE
Qty: 0 | Refills: 0 | Status: DISCONTINUED | OUTPATIENT
Start: 2018-11-02 | End: 2018-11-02

## 2018-11-02 RX ADMIN — Medication 15 MILLIGRAM(S): at 04:14

## 2018-11-02 RX ADMIN — Medication 650 MILLIGRAM(S): at 04:14

## 2018-11-02 RX ADMIN — Medication 10 MILLIGRAM(S): at 04:14

## 2018-11-02 RX ADMIN — SODIUM CHLORIDE 1000 MILLILITER(S): 9 INJECTION INTRAMUSCULAR; INTRAVENOUS; SUBCUTANEOUS at 02:14

## 2018-11-02 NOTE — ED PROVIDER NOTE - CONSTITUTIONAL, MLM
normal... Awake, alert, oriented to person, place, time/situation and in no apparent distress. Awake, alert, oriented to person, place, time/situation and in no apparent distress, pt comfortably sleeping on arrival at bedside

## 2018-11-02 NOTE — ED PROVIDER NOTE - PMH
Nini BarrSANGITA© syndrome    Guillain-[employer] [employer]    HIV (human immunodeficiency virus infection)  from birth

## 2018-11-02 NOTE — ED ADULT NURSE NOTE - PMH
Nini BarrSANGITA© syndrome    Guillain-[place]    HIV (human immunodeficiency virus infection)  from birth

## 2018-11-02 NOTE — ED PROVIDER NOTE - ENMT, MLM
Airway patent, Nasal mucosa clear. Mouth with normal mucosa. ALEXEI TM's clear. Throat has no vesicles, no oropharyngeal exudates and uvula is midline.

## 2018-11-02 NOTE — ED ADULT NURSE NOTE - NSIMPLEMENTINTERV_GEN_ALL_ED
Implemented All Universal Safety Interventions:  [place] to call system. Call bell, personal items and telephone within reach. Instruct patient to call for assistance. Room bathroom lighting operational. Non-slip footwear when patient is off stretcher. Physically safe environment: no spills, clutter or unnecessary equipment. Stretcher in lowest position, wheels locked, appropriate side rails in place.

## 2018-11-02 NOTE — ED PROVIDER NOTE - CRANIAL NERVE AND PUPILLARY EXAM
extra-ocular movements intact/cranial nerves 2-12 intact/central and peripheral vision intact/tongue is midline

## 2018-11-02 NOTE — ED PROVIDER NOTE - OBJECTIVE STATEMENT
29 y/o M pt with hx of Guillain-Barré, and HIV presents to ED c/o intermittent HA for a few months. Pt had a negative CT scan yesterday at [facility] for the same symptoms. Pt states his neurologist told him to avoid caffeine. Pt also notes watery diarrhea for 2 weeks and R ear pain. He did not take any pain medication today. Denies fever, chills, and recent antibiotic use. No further complaints at this time.

## 2018-11-02 NOTE — ED ADULT NURSE NOTE - OBJECTIVE STATEMENT
Pt a&ox3 c/o HA, chest discomfort, nausea, diarrhea, and cough. Pt reports recently being tx for PNA and admits to finishing Rx antibiotics. MAEx4. RR even and unlabored, lungs CTA kellee. #20G IV placed to right forearm, blood work drawn and sent to lab, results pending. Denies sick contacts denies fevers denies vomiting denies sob. Safety maintained, call bell in reach, appears in no apparent distress @ this time

## 2018-11-14 ENCOUNTER — EMERGENCY (EMERGENCY)
Facility: HOSPITAL | Age: 28
LOS: 1 days | Discharge: HOME | End: 2018-11-14
Attending: EMERGENCY MEDICINE | Admitting: EMERGENCY MEDICINE
Payer: COMMERCIAL

## 2018-11-14 VITALS
SYSTOLIC BLOOD PRESSURE: 135 MMHG | HEIGHT: 71 IN | OXYGEN SATURATION: 98 % | HEART RATE: 105 BPM | DIASTOLIC BLOOD PRESSURE: 81 MMHG | RESPIRATION RATE: 20 BRPM | TEMPERATURE: 98 F | WEIGHT: 154.98 LBS

## 2018-11-14 DIAGNOSIS — Z98.890 OTHER SPECIFIED POSTPROCEDURAL STATES: Chronic | ICD-10-CM

## 2018-11-14 PROCEDURE — 99284 EMERGENCY DEPT VISIT MOD MDM: CPT

## 2018-11-15 VITALS
RESPIRATION RATE: 19 BRPM | DIASTOLIC BLOOD PRESSURE: 74 MMHG | TEMPERATURE: 98 F | OXYGEN SATURATION: 100 % | HEART RATE: 92 BPM | SYSTOLIC BLOOD PRESSURE: 116 MMHG

## 2018-11-15 LAB
ALBUMIN SERPL ELPH-MCNC: 3.5 G/DL — SIGNIFICANT CHANGE UP (ref 3.3–5)
ALP SERPL-CCNC: 72 U/L — SIGNIFICANT CHANGE UP (ref 30–120)
ALT FLD-CCNC: 19 U/L DA — SIGNIFICANT CHANGE UP (ref 10–60)
ANION GAP SERPL CALC-SCNC: 8 MMOL/L — SIGNIFICANT CHANGE UP (ref 5–17)
APTT BLD: 29.8 SEC — SIGNIFICANT CHANGE UP (ref 28.5–37)
AST SERPL-CCNC: 20 U/L — SIGNIFICANT CHANGE UP (ref 10–40)
BASOPHILS # BLD AUTO: 0.03 K/UL — SIGNIFICANT CHANGE UP (ref 0–0.2)
BASOPHILS NFR BLD AUTO: 0.5 % — SIGNIFICANT CHANGE UP (ref 0–2)
BILIRUB SERPL-MCNC: 0.3 MG/DL — SIGNIFICANT CHANGE UP (ref 0.2–1.2)
BUN SERPL-MCNC: 17 MG/DL — SIGNIFICANT CHANGE UP (ref 7–23)
CALCIUM SERPL-MCNC: 9 MG/DL — SIGNIFICANT CHANGE UP (ref 8.4–10.5)
CHLORIDE SERPL-SCNC: 102 MMOL/L — SIGNIFICANT CHANGE UP (ref 96–108)
CO2 SERPL-SCNC: 30 MMOL/L — SIGNIFICANT CHANGE UP (ref 22–31)
CREAT SERPL-MCNC: 0.97 MG/DL — SIGNIFICANT CHANGE UP (ref 0.5–1.3)
EOSINOPHIL # BLD AUTO: 0.28 K/UL — SIGNIFICANT CHANGE UP (ref 0–0.5)
EOSINOPHIL NFR BLD AUTO: 4.5 % — SIGNIFICANT CHANGE UP (ref 0–6)
GLUCOSE SERPL-MCNC: 90 MG/DL — SIGNIFICANT CHANGE UP (ref 70–99)
HCT VFR BLD CALC: 38.9 % — LOW (ref 39–50)
HGB BLD-MCNC: 12.7 G/DL — LOW (ref 13–17)
IMM GRANULOCYTES NFR BLD AUTO: 0.3 % — SIGNIFICANT CHANGE UP (ref 0–1.5)
INR BLD: 1.11 RATIO — SIGNIFICANT CHANGE UP (ref 0.88–1.16)
LYMPHOCYTES # BLD AUTO: 2.41 K/UL — SIGNIFICANT CHANGE UP (ref 1–3.3)
LYMPHOCYTES # BLD AUTO: 38.6 % — SIGNIFICANT CHANGE UP (ref 13–44)
MCHC RBC-ENTMCNC: 30 PG — SIGNIFICANT CHANGE UP (ref 27–34)
MCHC RBC-ENTMCNC: 32.6 GM/DL — SIGNIFICANT CHANGE UP (ref 32–36)
MCV RBC AUTO: 92 FL — SIGNIFICANT CHANGE UP (ref 80–100)
MONOCYTES # BLD AUTO: 0.72 K/UL — SIGNIFICANT CHANGE UP (ref 0–0.9)
MONOCYTES NFR BLD AUTO: 11.5 % — SIGNIFICANT CHANGE UP (ref 2–14)
NEUTROPHILS # BLD AUTO: 2.78 K/UL — SIGNIFICANT CHANGE UP (ref 1.8–7.4)
NEUTROPHILS NFR BLD AUTO: 44.6 % — SIGNIFICANT CHANGE UP (ref 43–77)
NRBC # BLD: 0 /100 WBCS — SIGNIFICANT CHANGE UP (ref 0–0)
PLATELET # BLD AUTO: 239 K/UL — SIGNIFICANT CHANGE UP (ref 150–400)
POTASSIUM SERPL-MCNC: 3.9 MMOL/L — SIGNIFICANT CHANGE UP (ref 3.5–5.3)
POTASSIUM SERPL-SCNC: 3.9 MMOL/L — SIGNIFICANT CHANGE UP (ref 3.5–5.3)
PROT SERPL-MCNC: 8.6 G/DL — HIGH (ref 6–8.3)
PROTHROM AB SERPL-ACNC: 12.1 SEC — SIGNIFICANT CHANGE UP (ref 10–12.9)
RBC # BLD: 4.23 M/UL — SIGNIFICANT CHANGE UP (ref 4.2–5.8)
RBC # FLD: 13.2 % — SIGNIFICANT CHANGE UP (ref 10.3–14.5)
SODIUM SERPL-SCNC: 140 MMOL/L — SIGNIFICANT CHANGE UP (ref 135–145)
WBC # BLD: 6.24 K/UL — SIGNIFICANT CHANGE UP (ref 3.8–10.5)
WBC # FLD AUTO: 6.24 K/UL — SIGNIFICANT CHANGE UP (ref 3.8–10.5)

## 2018-11-15 PROCEDURE — 85610 PROTHROMBIN TIME: CPT

## 2018-11-15 PROCEDURE — 96375 TX/PRO/DX INJ NEW DRUG ADDON: CPT

## 2018-11-15 PROCEDURE — 36415 COLL VENOUS BLD VENIPUNCTURE: CPT

## 2018-11-15 PROCEDURE — 80053 COMPREHEN METABOLIC PANEL: CPT

## 2018-11-15 PROCEDURE — 70450 CT HEAD/BRAIN W/O DYE: CPT | Mod: 26

## 2018-11-15 PROCEDURE — 99284 EMERGENCY DEPT VISIT MOD MDM: CPT | Mod: 25

## 2018-11-15 PROCEDURE — 85730 THROMBOPLASTIN TIME PARTIAL: CPT

## 2018-11-15 PROCEDURE — 96374 THER/PROPH/DIAG INJ IV PUSH: CPT

## 2018-11-15 PROCEDURE — 85027 COMPLETE CBC AUTOMATED: CPT

## 2018-11-15 PROCEDURE — 70450 CT HEAD/BRAIN W/O DYE: CPT

## 2018-11-15 RX ORDER — MORPHINE SULFATE 50 MG/1
4 CAPSULE, EXTENDED RELEASE ORAL ONCE
Qty: 0 | Refills: 0 | Status: DISCONTINUED | OUTPATIENT
Start: 2018-11-15 | End: 2018-11-15

## 2018-11-15 RX ORDER — KETOROLAC TROMETHAMINE 30 MG/ML
30 SYRINGE (ML) INJECTION ONCE
Qty: 0 | Refills: 0 | Status: DISCONTINUED | OUTPATIENT
Start: 2018-11-15 | End: 2018-11-15

## 2018-11-15 RX ORDER — ACETAMINOPHEN 500 MG
1000 TABLET ORAL ONCE
Qty: 0 | Refills: 0 | Status: COMPLETED | OUTPATIENT
Start: 2018-11-15 | End: 2018-11-15

## 2018-11-15 RX ORDER — SODIUM CHLORIDE 9 MG/ML
1000 INJECTION, SOLUTION INTRAVENOUS ONCE
Qty: 0 | Refills: 0 | Status: COMPLETED | OUTPATIENT
Start: 2018-11-15 | End: 2018-11-15

## 2018-11-15 RX ADMIN — Medication 400 MILLIGRAM(S): at 03:41

## 2018-11-15 RX ADMIN — Medication 30 MILLIGRAM(S): at 02:24

## 2018-11-15 RX ADMIN — Medication 30 MILLIGRAM(S): at 00:54

## 2018-11-15 RX ADMIN — Medication 1000 MILLIGRAM(S): at 03:55

## 2018-11-15 RX ADMIN — MORPHINE SULFATE 4 MILLIGRAM(S): 50 CAPSULE, EXTENDED RELEASE ORAL at 01:26

## 2018-11-15 RX ADMIN — SODIUM CHLORIDE 1000 MILLILITER(S): 9 INJECTION, SOLUTION INTRAVENOUS at 00:54

## 2018-11-15 RX ADMIN — MORPHINE SULFATE 4 MILLIGRAM(S): 50 CAPSULE, EXTENDED RELEASE ORAL at 03:41

## 2018-11-15 NOTE — ED PROVIDER NOTE - PROGRESS NOTE DETAILS
History reviewed again with patient. He has seen his neurologist for this headache, had an MRI. Given gabapentin. No specific etiology discovered, as per patient

## 2018-11-15 NOTE — ED ADULT NURSE REASSESSMENT NOTE - NS ED NURSE REASSESS COMMENT FT1
pt reported little relief from pain meds. wanted to sleep before going home this morning
pt reports some relief of headache from earlier PRN medication. d/c to self to waiting room at this time

## 2018-11-15 NOTE — ED PROVIDER NOTE - CHPI ED SYMPTOMS NEG
no change in level of consciousness/no loss of consciousness/no confusion/no fever/no numbness/no blurred vision

## 2018-11-15 NOTE — ED PROVIDER NOTE - OBJECTIVE STATEMENT
Patient states he has a history of GBS and had a follow-up LP a month ago that showed no change. Since the LP, c/o h/a. Frontal, occipital, and constant. Went back to [place] where he had the LP, and was told he had a post LP h/a. Given some medication which he took for 30 days. Now still has h/a.    No fever. H/A worse on standing and moving around. No neck pain. Today, vomited multiple times. No diarrhea.    Patient HIV+. States his viral load is undetectable, and his T-cell count is "good" though does not know the number

## 2018-11-17 ENCOUNTER — EMERGENCY (EMERGENCY)
Facility: HOSPITAL | Age: 28
LOS: 1 days | Discharge: HOME | End: 2018-11-17
Attending: EMERGENCY MEDICINE
Payer: COMMERCIAL

## 2018-11-17 VITALS
DIASTOLIC BLOOD PRESSURE: 78 MMHG | OXYGEN SATURATION: 99 % | RESPIRATION RATE: 16 BRPM | WEIGHT: 154.98 LBS | HEART RATE: 97 BPM | HEIGHT: 71 IN | TEMPERATURE: 98 F | SYSTOLIC BLOOD PRESSURE: 131 MMHG

## 2018-11-17 VITALS
HEART RATE: 98 BPM | OXYGEN SATURATION: 100 % | DIASTOLIC BLOOD PRESSURE: 98 MMHG | SYSTOLIC BLOOD PRESSURE: 145 MMHG | TEMPERATURE: 98 F | RESPIRATION RATE: 16 BRPM

## 2018-11-17 DIAGNOSIS — Z98.890 OTHER SPECIFIED POSTPROCEDURAL STATES: Chronic | ICD-10-CM

## 2018-11-17 PROBLEM — J45.909 UNSPECIFIED ASTHMA, UNCOMPLICATED: Chronic | Status: ACTIVE | Noted: 2018-11-15

## 2018-11-17 PROCEDURE — 99283 EMERGENCY DEPT VISIT LOW MDM: CPT | Mod: 25

## 2018-11-17 PROCEDURE — 96372 THER/PROPH/DIAG INJ SC/IM: CPT

## 2018-11-17 PROCEDURE — 99284 EMERGENCY DEPT VISIT MOD MDM: CPT | Mod: 25

## 2018-11-17 RX ORDER — METOCLOPRAMIDE HCL 10 MG
5 TABLET ORAL ONCE
Qty: 0 | Refills: 0 | Status: COMPLETED | OUTPATIENT
Start: 2018-11-17 | End: 2018-11-17

## 2018-11-17 RX ORDER — DIPHENHYDRAMINE HCL 50 MG
25 CAPSULE ORAL DAILY
Qty: 0 | Refills: 0 | Status: DISCONTINUED | OUTPATIENT
Start: 2018-11-17 | End: 2018-11-22

## 2018-11-17 RX ORDER — KETOROLAC TROMETHAMINE 30 MG/ML
15 SYRINGE (ML) INJECTION ONCE
Qty: 0 | Refills: 0 | Status: DISCONTINUED | OUTPATIENT
Start: 2018-11-17 | End: 2018-11-17

## 2018-11-17 RX ADMIN — Medication 15 MILLIGRAM(S): at 05:44

## 2018-11-17 RX ADMIN — Medication 25 MILLIGRAM(S): at 05:44

## 2018-11-17 RX ADMIN — Medication 5 MILLIGRAM(S): at 05:44

## 2018-11-17 NOTE — ED ADULT NURSE NOTE - NSIMPLEMENTINTERV_GEN_ALL_ED
Implemented All Universal Safety Interventions:  [place] to call system. Call bell, personal items and telephone within reach. Instruct patient to call for assistance. Room bathroom lighting operational. Non-slip footwear when patient is off stretcher. Physically safe environment: no spills, clutter or unnecessary equipment. Stretcher in lowest position, wheels locked, appropriate side rails in place.

## 2018-11-17 NOTE — ED PROVIDER NOTE - ATTENDING CONTRIBUTION TO CARE
I, Cornell Hernandez, performed a face to face bedside interview with this patient regarding history of present illness, review of symptoms and relevant past medical, social and family history.  I completed an independent physical examination. I have communicated the patient’s plan of care and disposition with the ACP.        28 year old male with GBS, recurrent headaches with multiple CT scans, LAST 2 DAYS AGO. presenting to the ER with acute onset of headache while at work, no accidents or injuries or head trauma, pt states that headache to similar headaches in the past. states that he feels like the front and the back of his brain is pushing on his skull; pe awake alert in nad; hent ncat neck supple jesus alberto  s1 s2 chest clear abd soft neuro nonfocal ;dx headache;

## 2018-11-17 NOTE — ED PROVIDER NOTE - OBJECTIVE STATEMENT
28 year old male with GBS, recurrent headaches with multiple CT scans, LAST 2 DAYS AGO. presenting to the ER with acute onset of headache while at work, no accidents or injuries or head trauma, pt states that headache to similar headaches in the past. states that he feels like the front and the back of his brain is pushing on his skull, associated with mild dizziness. pt states that he took tylenol yesterday as prescribed by his NEUROLOGIST. pt states that he has not taken any pain medication with onset of this headache. when asked why he says " I don't know....." when asked what works to help treat his headaches he states " I don't know....." denies cp or sob numbness or tingling in the lower extremities fever or chills recent travel or illness

## 2018-11-17 NOTE — ED ADULT TRIAGE NOTE - CHIEF COMPLAINT QUOTE
Patient A&Ox4 complaining of headache x45 minutes. Denies taking anything for pain prior to calling 911 to come to ED. Denies any chest pain, shortness of breath, nausea or dizziness.

## 2018-11-17 NOTE — ED PROVIDER NOTE - MUSCULOSKELETAL, MLM
Spine appears normal no midline tenderness. pt vertebral tenderness or step offs. range of motion is not limited, no muscle or joint tenderness.

## 2018-11-17 NOTE — ED ADULT NURSE NOTE - PMH
Asthma    Guillain BarrÃ© syndrome    Guillain-[place]    HIV (human immunodeficiency virus infection)    HIV (human immunodeficiency virus infection)  from birth

## 2018-11-17 NOTE — ED ADULT NURSE NOTE - CHPI ED NUR SYMPTOMS NEG
no loss of consciousness/no confusion/no nausea/no dizziness/no numbness/no fever/no change in level of consciousness/no vomiting/no weakness

## 2018-11-17 NOTE — ED PROVIDER NOTE - PMH
Asthma    Guillain BarrÃ© syndrome    Guillain-[place]    HIV (human immunodeficiency virus infection)    HIV (human immunodeficiency virus infection)  from birth

## 2018-12-19 NOTE — ED ADULT NURSE NOTE - NS ED NURSE LEVEL OF CONSCIOUSNESS MENTAL STATUS
I reviewed the H&P, I examined the patient, and there are no changes in the patient's condition.    CV: RRR, S1S2  Pulm: CTA bilaterally  
Alert/Cooperative/Awake

## 2019-10-18 NOTE — ED ADULT NURSE NOTE - NSSISCREENINGQ5_ED_A_ED
IGNACIO   No

## 2020-03-01 ENCOUNTER — OUTPATIENT (OUTPATIENT)
Dept: OUTPATIENT SERVICES | Facility: HOSPITAL | Age: 30
LOS: 1 days | End: 2020-03-01
Payer: MEDICAID

## 2020-03-01 DIAGNOSIS — Z98.890 OTHER SPECIFIED POSTPROCEDURAL STATES: Chronic | ICD-10-CM

## 2020-03-01 PROCEDURE — G9001: CPT

## 2020-03-24 ENCOUNTER — EMERGENCY (EMERGENCY)
Facility: HOSPITAL | Age: 30
LOS: 1 days | Discharge: HOME | End: 2020-03-24
Attending: EMERGENCY MEDICINE
Payer: COMMERCIAL

## 2020-03-24 VITALS
DIASTOLIC BLOOD PRESSURE: 91 MMHG | RESPIRATION RATE: 20 BRPM | OXYGEN SATURATION: 98 % | HEART RATE: 120 BPM | SYSTOLIC BLOOD PRESSURE: 141 MMHG

## 2020-03-24 VITALS — OXYGEN SATURATION: 95 % | RESPIRATION RATE: 18 BRPM | HEART RATE: 112 BPM

## 2020-03-24 DIAGNOSIS — Z98.890 OTHER SPECIFIED POSTPROCEDURAL STATES: Chronic | ICD-10-CM

## 2020-03-24 PROCEDURE — 93005 ELECTROCARDIOGRAM TRACING: CPT

## 2020-03-24 PROCEDURE — 99285 EMERGENCY DEPT VISIT HI MDM: CPT | Mod: 25

## 2020-03-24 PROCEDURE — 82962 GLUCOSE BLOOD TEST: CPT

## 2020-03-24 PROCEDURE — 93010 ELECTROCARDIOGRAM REPORT: CPT

## 2020-03-24 PROCEDURE — 99284 EMERGENCY DEPT VISIT MOD MDM: CPT

## 2020-03-24 NOTE — ED PROVIDER NOTE - PATIENT PORTAL LINK FT
You can access the FollowMyHealth Patient Portal offered by [facility] by registering at the following website: [email or web address]. By joining [facility]’s FollowMyHealth portal, you will also be able to view your health information using other applications (apps) compatible with our system.

## 2020-03-24 NOTE — ED PROVIDER NOTE - ATTENDING CONTRIBUTION TO CARE
I, Oliver Hernandez, performed a face to face bedside interview with this patient regarding history of present illness, review of symptoms and relevant past medical, social and family history.  I completed an independent physical examination. I have communicated the patient’s plan of care and disposition with the resident  30 year old male presents following drug use. pt states that he smoked marijuana and "there must have been something in it" because he then became responsive, woke up with narcan and is currently at baseline. pt reports that he had congestion yesterday and was tested for covid, but denies cough, congestion, SOB or fever today. In addition, pt reports that he has a hx of GBS, reports chronic b/l LE weakness, but reports that he has been able to walk "all day all over yesterday and today"  Gen: NAD, well appearing  CV: RRR  Pul: CTA b/l  Abd: Soft, non-distended, non-tender  Neuro: no focal deficits, MS 5/5 b/l UE and LE, +DTR 2/4 b/l patella and achilles tendons  Pt improved, monitored for several hours following narcan and still awake and alert, no clinical sign of GBS with good MS and DTR, no increased resp effort or hypoxia, stable for dc

## 2020-03-24 NOTE — ED ADULT NURSE NOTE - NS TRANSFER PATIENT BELONGINGS
Anesthesia Start and Stop Event Times     Date Time Event    2019 0944 Anesthesia Start     1019 Anesthesia Stop        Responsible Staff  19    Name Role Begin End    Padmini Delgado M.D. Anesth 0944 1019        Preop Diagnosis (Free Text):  Pre-op Diagnosis     MISSED AB        Preop Diagnosis (Codes):  Diagnosis Information     Diagnosis Code(s):         Post op Diagnosis  Incomplete       Premium Reason  E. Weekend    Comments:                                                                       
Clothing

## 2020-03-24 NOTE — ED ADULT NURSE NOTE - PMH
Asthma    Guillain BarrÃ© syndrome    Guillain-[place]    HIV (human immunodeficiency virus infection)    HIV (human immunodeficiency virus infection)  from birth

## 2020-03-24 NOTE — ED ADULT NURSE NOTE - OBJECTIVE STATEMENT
Patient states was brought to ED because he was found outside of [facility] s/p heroin OD, was given narcan.  Patient states he was tested for COVID-19 yesterday because he was feeling weak, cough, and chest pain, denies any results at this time. PAtient offering no other complaints at this time.

## 2020-03-24 NOTE — ED PROVIDER NOTE - PMH
Asthma    Guillain BarrÃ© syndrome    Guillain-[place]    HIV (human immunodeficiency virus infection)    HIV (human immunodeficiency virus infection)  from birth

## 2020-03-24 NOTE — ED ADULT TRIAGE NOTE - CHIEF COMPLAINT QUOTE
pt found on floor by [facility] at . as per EMS pt unresponsive on scene and pt received narcan pta. pt states he only smoked weed. pt is sleepy in triage. pt sent to CC with MD at bedside.

## 2020-03-24 NOTE — ED ADULT NURSE REASSESSMENT NOTE - NS ED NURSE REASSESS COMMENT FT1
assumed care of pt pt sleeping in stretcher easily arousbale to voice, pt provided with sandwich pending discharge. pt offers no complaints.
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
## 2020-03-24 NOTE — ED PROVIDER NOTE - OBJECTIVE STATEMENT
29 yo M with pmh HIV (states in January last cd4 count was 301, also states his viral load was undetectable), guillian barre syndrome diagnosed 2017 presents after being found unresponsive by EMS outside department of  with pinpoint pupils. Given 4 mg intranasal narcan which improved mental status. patient was able to give a history by time he came to ED. Endorses marijuana use, has marijuana with him and has odor of marijuana but denies other drugs such as heroin or cocaine. States he was seen at [facility] yesterday for chest pain, and congestion and was swabbed for COVID but does not yet have results. Endorses feeling weak recently but also endorses ambulating frequently recently. Endorses cigarette use. No cough, congestion, sob or chest pain or fevers reported today. Patient is homeless which is why he was going to  despite being told to isolate

## 2020-03-25 ENCOUNTER — INPATIENT (INPATIENT)
Facility: HOSPITAL | Age: 30
LOS: 8 days | Discharge: HOME | DRG: 947 | End: 2020-04-03
Attending: INTERNAL MEDICINE | Admitting: STUDENT IN AN ORGANIZED HEALTH CARE EDUCATION/TRAINING PROGRAM
Payer: COMMERCIAL

## 2020-03-25 VITALS
WEIGHT: 199.96 LBS | HEIGHT: 73 IN | HEART RATE: 98 BPM | TEMPERATURE: 98 F | OXYGEN SATURATION: 94 % | SYSTOLIC BLOOD PRESSURE: 147 MMHG | RESPIRATION RATE: 18 BRPM | DIASTOLIC BLOOD PRESSURE: 96 MMHG

## 2020-03-25 DIAGNOSIS — I63.9 CEREBRAL INFARCTION, UNSPECIFIED: ICD-10-CM

## 2020-03-25 DIAGNOSIS — Z98.890 OTHER SPECIFIED POSTPROCEDURAL STATES: Chronic | ICD-10-CM

## 2020-03-25 LAB
ALBUMIN SERPL ELPH-MCNC: 3.6 G/DL — SIGNIFICANT CHANGE UP (ref 3.3–5.2)
ALP SERPL-CCNC: 50 U/L — SIGNIFICANT CHANGE UP (ref 40–120)
ALT FLD-CCNC: 21 U/L — SIGNIFICANT CHANGE UP
ANION GAP SERPL CALC-SCNC: 11 MMOL/L — SIGNIFICANT CHANGE UP (ref 5–17)
APTT BLD: 31.9 SEC — SIGNIFICANT CHANGE UP (ref 27.5–36.3)
AST SERPL-CCNC: 34 U/L — SIGNIFICANT CHANGE UP
BASOPHILS # BLD AUTO: 0.01 K/UL — SIGNIFICANT CHANGE UP (ref 0–0.2)
BASOPHILS NFR BLD AUTO: 0.1 % — SIGNIFICANT CHANGE UP (ref 0–2)
BILIRUB SERPL-MCNC: 0.5 MG/DL — SIGNIFICANT CHANGE UP (ref 0.4–2)
BUN SERPL-MCNC: 22 MG/DL — HIGH (ref 8–20)
CALCIUM SERPL-MCNC: 8.3 MG/DL — LOW (ref 8.6–10.2)
CHLORIDE SERPL-SCNC: 92 MMOL/L — LOW (ref 98–107)
CHOLEST SERPL-MCNC: 134 MG/DL — SIGNIFICANT CHANGE UP (ref 110–199)
CO2 SERPL-SCNC: 27 MMOL/L — SIGNIFICANT CHANGE UP (ref 22–29)
CREAT SERPL-MCNC: 1.11 MG/DL — SIGNIFICANT CHANGE UP (ref 0.5–1.3)
EOSINOPHIL # BLD AUTO: 0 K/UL — SIGNIFICANT CHANGE UP (ref 0–0.5)
EOSINOPHIL NFR BLD AUTO: 0 % — SIGNIFICANT CHANGE UP (ref 0–6)
GLUCOSE SERPL-MCNC: 102 MG/DL — HIGH (ref 70–99)
HBA1C BLD-MCNC: 4.6 % — SIGNIFICANT CHANGE UP (ref 4–5.6)
HCT VFR BLD CALC: 33.8 % — LOW (ref 39–50)
HDLC SERPL-MCNC: 33 MG/DL — LOW
HGB BLD-MCNC: 10.8 G/DL — LOW (ref 13–17)
IMM GRANULOCYTES NFR BLD AUTO: 0.4 % — SIGNIFICANT CHANGE UP (ref 0–1.5)
INR BLD: 1.27 RATIO — HIGH (ref 0.88–1.16)
LIPID PNL WITH DIRECT LDL SERPL: 74 MG/DL — SIGNIFICANT CHANGE UP
LYMPHOCYTES # BLD AUTO: 1.4 K/UL — SIGNIFICANT CHANGE UP (ref 1–3.3)
LYMPHOCYTES # BLD AUTO: 14.2 % — SIGNIFICANT CHANGE UP (ref 13–44)
MCHC RBC-ENTMCNC: 30.1 PG — SIGNIFICANT CHANGE UP (ref 27–34)
MCHC RBC-ENTMCNC: 32 GM/DL — SIGNIFICANT CHANGE UP (ref 32–36)
MCV RBC AUTO: 94.2 FL — SIGNIFICANT CHANGE UP (ref 80–100)
MONOCYTES # BLD AUTO: 1.03 K/UL — HIGH (ref 0–0.9)
MONOCYTES NFR BLD AUTO: 10.4 % — SIGNIFICANT CHANGE UP (ref 2–14)
NEUTROPHILS # BLD AUTO: 7.39 K/UL — SIGNIFICANT CHANGE UP (ref 1.8–7.4)
NEUTROPHILS NFR BLD AUTO: 74.9 % — SIGNIFICANT CHANGE UP (ref 43–77)
PLATELET # BLD AUTO: 179 K/UL — SIGNIFICANT CHANGE UP (ref 150–400)
POTASSIUM SERPL-MCNC: 4 MMOL/L — SIGNIFICANT CHANGE UP (ref 3.5–5.3)
POTASSIUM SERPL-SCNC: 4 MMOL/L — SIGNIFICANT CHANGE UP (ref 3.5–5.3)
PROT SERPL-MCNC: 6.7 G/DL — SIGNIFICANT CHANGE UP (ref 6.6–8.7)
PROTHROM AB SERPL-ACNC: 14.5 SEC — HIGH (ref 10–12.9)
RBC # BLD: 3.59 M/UL — LOW (ref 4.2–5.8)
RBC # FLD: 12.3 % — SIGNIFICANT CHANGE UP (ref 10.3–14.5)
SODIUM SERPL-SCNC: 130 MMOL/L — LOW (ref 135–145)
TOTAL CHOLESTEROL/HDL RATIO MEASUREMENT: 4 RATIO — SIGNIFICANT CHANGE UP (ref 3.4–9.6)
TRIGL SERPL-MCNC: 136 MG/DL — SIGNIFICANT CHANGE UP (ref 10–200)
TSH SERPL-MCNC: 1.39 UIU/ML — SIGNIFICANT CHANGE UP (ref 0.27–4.2)
WBC # BLD: 9.87 K/UL — SIGNIFICANT CHANGE UP (ref 3.8–10.5)
WBC # FLD AUTO: 9.87 K/UL — SIGNIFICANT CHANGE UP (ref 3.8–10.5)

## 2020-03-25 PROCEDURE — 70498 CT ANGIOGRAPHY NECK: CPT | Mod: 26

## 2020-03-25 PROCEDURE — 93306 TTE W/DOPPLER COMPLETE: CPT | Mod: 26

## 2020-03-25 PROCEDURE — 70551 MRI BRAIN STEM W/O DYE: CPT | Mod: 26

## 2020-03-25 PROCEDURE — 70496 CT ANGIOGRAPHY HEAD: CPT | Mod: 26

## 2020-03-25 PROCEDURE — 0042T: CPT

## 2020-03-25 PROCEDURE — 99285 EMERGENCY DEPT VISIT HI MDM: CPT

## 2020-03-25 PROCEDURE — 93010 ELECTROCARDIOGRAM REPORT: CPT

## 2020-03-25 PROCEDURE — 99223 1ST HOSP IP/OBS HIGH 75: CPT

## 2020-03-25 PROCEDURE — 70450 CT HEAD/BRAIN W/O DYE: CPT | Mod: 26,59

## 2020-03-25 RX ORDER — ELVITEGRAVIR, COBICISTAT, EMTRICITABINE, AND TENOFOVIR ALAFENAMIDE 150; 150; 200; 10 MG/1; MG/1; MG/1; MG/1
1 TABLET ORAL DAILY
Refills: 0 | Status: DISCONTINUED | OUTPATIENT
Start: 2020-03-25 | End: 2020-04-03

## 2020-03-25 RX ORDER — FAMOTIDINE 10 MG/ML
20 INJECTION INTRAVENOUS
Refills: 0 | Status: DISCONTINUED | OUTPATIENT
Start: 2020-03-25 | End: 2020-03-26

## 2020-03-25 RX ORDER — LABETALOL HCL 100 MG
10 TABLET ORAL ONCE
Refills: 0 | Status: COMPLETED | OUTPATIENT
Start: 2020-03-25 | End: 2020-03-25

## 2020-03-25 RX ORDER — QUETIAPINE FUMARATE 200 MG/1
200 TABLET, FILM COATED ORAL AT BEDTIME
Refills: 0 | Status: DISCONTINUED | OUTPATIENT
Start: 2020-03-25 | End: 2020-04-03

## 2020-03-25 RX ORDER — DIAZEPAM 5 MG
5 TABLET ORAL ONCE
Refills: 0 | Status: DISCONTINUED | OUTPATIENT
Start: 2020-03-25 | End: 2020-03-25

## 2020-03-25 RX ORDER — ELVITEGRAVIR, COBICISTAT, EMTRICITABINE, AND TENOFOVIR ALAFENAMIDE 150; 150; 200; 10 MG/1; MG/1; MG/1; MG/1
1 TABLET ORAL
Qty: 0 | Refills: 0 | DISCHARGE

## 2020-03-25 RX ORDER — ALTEPLASE 100 MG
8.7 KIT INTRAVENOUS ONCE
Refills: 0 | Status: COMPLETED | OUTPATIENT
Start: 2020-03-25 | End: 2020-03-25

## 2020-03-25 RX ORDER — SODIUM CHLORIDE 9 MG/ML
1000 INJECTION INTRAMUSCULAR; INTRAVENOUS; SUBCUTANEOUS
Refills: 0 | Status: DISCONTINUED | OUTPATIENT
Start: 2020-03-25 | End: 2020-03-25

## 2020-03-25 RX ORDER — ALTEPLASE 100 MG
78 KIT INTRAVENOUS ONCE
Refills: 0 | Status: COMPLETED | OUTPATIENT
Start: 2020-03-25 | End: 2020-03-25

## 2020-03-25 RX ORDER — GABAPENTIN 400 MG/1
0 CAPSULE ORAL
Qty: 0 | Refills: 0 | DISCHARGE

## 2020-03-25 RX ORDER — ASPIRIN/CALCIUM CARB/MAGNESIUM 324 MG
325 TABLET ORAL DAILY
Refills: 0 | Status: DISCONTINUED | OUTPATIENT
Start: 2020-03-26 | End: 2020-03-26

## 2020-03-25 RX ORDER — QUETIAPINE FUMARATE 200 MG/1
100 TABLET, FILM COATED ORAL DAILY
Refills: 0 | Status: DISCONTINUED | OUTPATIENT
Start: 2020-03-25 | End: 2020-04-03

## 2020-03-25 RX ORDER — ATORVASTATIN CALCIUM 80 MG/1
40 TABLET, FILM COATED ORAL AT BEDTIME
Refills: 0 | Status: DISCONTINUED | OUTPATIENT
Start: 2020-03-25 | End: 2020-03-26

## 2020-03-25 RX ORDER — ALBUTEROL 90 UG/1
2 AEROSOL, METERED ORAL
Qty: 0 | Refills: 0 | DISCHARGE

## 2020-03-25 RX ADMIN — ALTEPLASE 522 MILLIGRAM(S): KIT at 06:02

## 2020-03-25 RX ADMIN — FAMOTIDINE 20 MILLIGRAM(S): 10 INJECTION INTRAVENOUS at 17:09

## 2020-03-25 RX ADMIN — SODIUM CHLORIDE 75 MILLILITER(S): 9 INJECTION INTRAMUSCULAR; INTRAVENOUS; SUBCUTANEOUS at 11:49

## 2020-03-25 RX ADMIN — SODIUM CHLORIDE 75 MILLILITER(S): 9 INJECTION INTRAMUSCULAR; INTRAVENOUS; SUBCUTANEOUS at 21:33

## 2020-03-25 RX ADMIN — Medication 5 MILLIGRAM(S): at 17:34

## 2020-03-25 RX ADMIN — Medication 10 MILLIGRAM(S): at 08:34

## 2020-03-25 RX ADMIN — ELVITEGRAVIR, COBICISTAT, EMTRICITABINE, AND TENOFOVIR ALAFENAMIDE 1 TABLET(S): 150; 150; 200; 10 TABLET ORAL at 13:10

## 2020-03-25 RX ADMIN — ATORVASTATIN CALCIUM 40 MILLIGRAM(S): 80 TABLET, FILM COATED ORAL at 21:29

## 2020-03-25 RX ADMIN — ALTEPLASE 78 MILLIGRAM(S): KIT at 06:06

## 2020-03-25 RX ADMIN — QUETIAPINE FUMARATE 100 MILLIGRAM(S): 200 TABLET, FILM COATED ORAL at 11:49

## 2020-03-25 RX ADMIN — QUETIAPINE FUMARATE 200 MILLIGRAM(S): 200 TABLET, FILM COATED ORAL at 21:29

## 2020-03-25 NOTE — H&P ADULT - NSHPLABSRESULTS_GEN_ALL_CORE
10.8   9.87  )-----------( 179      ( 25 Mar 2020 06:01 )             33.8   03-25    130<L>  |  92<L>  |  22.0<H>  ----------------------------<  102<H>  4.0   |  27.0  |  1.11    Ca    8.3<L>      25 Mar 2020 06:01    TPro  6.7  /  Alb  3.6  /  TBili  0.5  /  DBili  x   /  AST  34  /  ALT  21  /  AlkPhos  50  03-25  PT/INR - ( 25 Mar 2020 06:01 )   PT: 14.5 sec;   INR: 1.27 ratio         PTT - ( 25 Mar 2020 06:01 )  PTT:31.9 sec    < from: CT Angio Neck w/ IV Cont (03.25.20 @ 06:24) >    IMPRESSION:  CT ANGIOGRAPHY NECK:  1.  Patent cervical vasculature.  No hemodynamically significant carotid stenosis using NASCET criteria.  No flow-limiting vertebral artery stenosis.  No evidence of dissection.  2.  Prominent thymic tissue in the anterior mediastinum.Please note that CT scan cannot accurately distinguish between thymic hyperplasia and thymic malignancy.  MRI evaluation may be obtained as clinically warranted.  3.  Enlarged adenoids.    CT ANGIOGRAPHY BRAIN: No vessel occlusion, flow-limiting stenosis or aneurysm is identified about the [place].    TEODORO MELLO M.D.,ATTENDING RADIOLOGIST  This document has been electronically signed. Mar 25 2020  6:11AM      < end of copied text >  < from: CT Perfusion w/ Maps w/ IV Cont (03.25.20 @ 05:54) >    IMPRESSION:   Core infarct (CBF <  30%:): 0 mL  Penumbra (Tmax >6s): 0 mL  Mismatched volume: 0 mL  Mismatched ratio: Not applicable      TEODORO MELLO M.D.,ATTENDING RADIOLOGIST  This document has been electronically signed. Mar 25 2020  6:13AM        < end of copied text >

## 2020-03-25 NOTE — H&P ADULT - NSHPREVIEWOFSYSTEMS_GEN_ALL_CORE
General:  Pain well controlled with PRNs, no fevers, chills, worsening of LE weakness  HEENT: No change in vision, No change in hearing, No sore throat  Respiratory: No shortness of breath, No Cough  Cardiovascular: No palpitations, no chest pain, no nocturnal or positional dyspnea.  Gastrointestinal: No change in bowel habits, no change in appetite  Genitourinary: No Frequency, No Dysuria, no Hematuria  Neurologic: worsening  weakness in LE from his baseline, no seizure reported, headaches well controlled with PRN medications

## 2020-03-25 NOTE — ED PROVIDER NOTE - OBJECTIVE STATEMENT
31 yo PMHx HIV, Guillain [place] presents to ED c/o b/l leg pain. Patient states cannot feel legs; states pain from toes to back of legs to back. States feels similar to Guillain [place] flare, last being end of January.   Denies chest pain, SOB. 31 yo PMHx HIV, Guillain [place] presents to ED c/o b/l leg pain. Patient states cannot feel legs; states pain from toes to back of legs to back. States initially began while walking, sudden onset, unable to ambulate secondary to weakness. States feels similar to Guillain [place] flare but included b/l LE weakness, last flare January 2017.  Denies chest pain, SOB. 29 yo PMHx HIV, Guillain [place] presents to ED c/o b/l leg weakness L>R. Patient states cannot feel legs; states pain from toes to back of legs extending upward to back. States suddenly began while walking, unable to ambulate secondary to weakness. States feels similar to Guillain [place] flare but at that time included b/l LE weakness, last flare January 2017. Unknown CD4 count, viral load undetectable. No further complaints at this time.   Denies chest pain, SOB. 31 yo PMHx HIV, Guillain [place] presents to ED c/o b/l leg weakness L>R. Patient states cannot feel legs; states tingling from toes to back of legs extending upward to back. States suddenly began while walking, unable to ambulate secondary to weakness. States feels similar to Guillain [place] flare but at that time included b/l LE weakness, last flare January 2017. Unknown CD4 count, viral load undetectable. Patient No further complaints at this time.   Denies chest pain, SOB.

## 2020-03-25 NOTE — H&P ADULT - ASSESSMENT
29yo M with LE weakness s/p tPa    Neuro  Admit to neuro ICU with neuro checks & VS q1hr  Neurology eval  stroke w/u  CT head tomorrow at 6am (24hr post tPa)  MRI of the brain  cont Seroquel 100am/200pm (home meds)    Resp  Keep O2 sat >92%  IS    CV  Keep BP <180, labetalol prn  Echo ord      NS @75  voding  supplement electrolytes as needed    GI  dysphagia screen, advance to regular as tolerated & pass dysphagia    Heme  cont HIV home med  mechanical DVT prophylaxis   avoid chemichal DVT prophylaxis x24hr    Endo  ck Lipid profile & HgA1C in am  SS    DIspo  NCCU  Full code    Images & labs reviewed  Case discussed w/ Dr Maza

## 2020-03-25 NOTE — ED PROVIDER NOTE - PROGRESS NOTE DETAILS
Given the significant and immediate threats to this patient based on initial presentation, the benefits of emergency contrast-enhanced CT imaging without obtaining GFR/creatinine serum level results greatly outweigh the potential risk of harm due to contrast-induced nephropathy. ANA Cooley: Given the significant and immediate threats to this patient based on initial presentation, the benefits of emergency contrast-enhanced CT imaging without obtaining GFR/creatinine serum level results greatly outweigh the potential risk of harm due to contrast-induced nephropathy. ANA Cooley: Patient triaged as level 4 leg pain. Triage note did not reflect unilateral weakness. No provider called to bedside. As soon as patient evaluated by this provider, code stroke activated. ANA Cooley evaluated patient and noticed LLE weakness, I went to bedside immediately and pt had LLE weakness minimal lifting against gravity, drops immediately after mild lift. States different than baseline, code stroke activated. Spoke with Dr. Aldridge agrees with TPA administration TPA given MICU called multiple times, no response

## 2020-03-25 NOTE — H&P ADULT - NSICDXPASTMEDICALHX_GEN_ALL_CORE_FT
PAST MEDICAL HISTORY:  Asthma     Guillain BarrÃ© syndrome     Guillain-[place]     HIV (human immunodeficiency virus infection)     HIV (human immunodeficiency virus infection) from birth

## 2020-03-25 NOTE — ED ADULT NURSE REASSESSMENT NOTE - NS ED NURSE REASSESS COMMENT FT1
seen in pt. earlier chart that pt. was recently swabbed for COVID, results pending; pt. did not endorse this upon arrival to ED.

## 2020-03-25 NOTE — ED ADULT NURSE NOTE - NSIMPLEMENTINTERV_GEN_ALL_ED
Implemented All Fall with Harm Risk Interventions:  [place] to call system. Call bell, personal items and telephone within reach. Instruct patient to call for assistance. Room bathroom lighting operational. Non-slip footwear when patient is off stretcher. Physically safe environment: no spills, clutter or unnecessary equipment. Stretcher in lowest position, wheels locked, appropriate side rails in place. Provide visual cue, wrist band, yellow gown, etc. Monitor gait and stability. Monitor for mental status changes and reorient to person, place, and time. Review medications for side effects contributing to fall risk. Reinforce activity limits and safety measures with patient and family. Provide visual clues: red socks.

## 2020-03-25 NOTE — CONSULT NOTE ADULT - ASSESSMENT
The patient is a 30y Male who is followed by neurology because of left leg weakness    Left leg weakness  possible CVA  received altelplase at 0602 3/25/2020    ICU admission for post tPA vitals and neurochecks per protocol  no antiplatelets or anticoagulant for 24 hours after tPA administration  repeat head CT  24 hours after tPA administration (at 0600 3/26/2020)  MRI brain  PT/OT  keep BP under 180/105  lipid panel, continue lipitor 40 mg for now, may need to adjust dose after lipid panel results  goal LDL <70  DVT ppx    I would anticipate he will be ready for downgrade from ICU once his follow up CT shows no blood and his need for frequent vitals ends tomorrow morning (after 0600 on 3/26/2020)    HIV  continue home anti-virals     Tobacco use   smokes about 1/2 PPD  smoking cessation was recommended    will follow with you    Karri Stover MD PhD   [phone]

## 2020-03-25 NOTE — CHART NOTE - NSCHARTNOTEFT_GEN_A_CORE
Neurointerventional attending note      Called by ED this AM as stroke alert. Patient has pervious history of GBS. HIV+  Was awake this AM and at around 3 hours prior to coming into ED developed weakness of his leg.    Initial complaint was pain resulting in delay in determining that the leg was weak. Later to ED and according to ED patient complained he was weak and denied pain.     NIHSS-1 leg weakness    CT head did not show evidence of large stroke or intracranial hemorrhage    Decision was made to give IV tPA as patient is within the window to receive medication  CTA/CTP did not show LVO or penumbra    PLAN- ICU for routine post tPA monitoring  Follow up as per neuro service  MRI brain w/o contrast to evaluate for new stroke  If negative for new stroke- will need additional work up for etiology of leg weakness.

## 2020-03-25 NOTE — CHART NOTE - NSCHARTNOTEFT_GEN_A_CORE
CAPRINI SCORE     Patient has an estimated Caprini Score of greater than 5.  However, the patient's unique clinical situation will be addressed in an individual manner to determine appropriate anticoagulation treatment, if any.  At this time no chemical DVT prophylaxis due to recent TPA infusion. Mechancial DVT prophylaxis is permitted.      AGE RELATED RISK FACTORS                                                       MOBILITY RELATED FACTORS  [ ] Age 41-60 years                                            (1 Point)                  [ ] Bed rest                                                        (1 Point)  [ ] Age: 61-74 years                                           (2 Points)                 [ ] Plaster cast                                                   (2 Points)  [ ] Age= 75 years                                              (3 Points)                 [ ] Bed bound for more than 72 hours                 (2 Points)    DISEASE RELATED RISK FACTORS                                               GENDER SPECIFIC FACTORS  [ ] Edema in the lower extremities                       (1 Point)                  [ ] Pregnancy                                                     (1 Point)  [ ] Varicose veins                                               (1 Point)                  [ ] Post-partum < 6 weeks                                   (1 Point)             [ ] BMI > 25 Kg/m2                                            (1 Point)                  [ ] Hormonal therapy  or oral contraception          (1 Point)                 [ ] Sepsis (in the previous month)                        (1 Point)                  [ ] History of pregnancy complications                 (1 point)  [ ] Pneumonia or serious lung disease                                               [ ] Unexplained or recurrent                     (1 Point)           (in the previous month)                               (1 Point)  [ ] Abnormal pulmonary function test                     (1 Point)                 SURGERY RELATED RISK FACTORS  [ ] Acute myocardial infarction                              (1 Point)                 [ ]  Section                                             (1 Point)  [ ] Congestive heart failure (in the previous month)  (1 Point)               [ ] Minor surgery                                                  (1 Point)   [ ] Inflammatory bowel disease                             (1 Point)                 [ ] Arthroscopic surgery                                        (2 Points)  [ ] Central venous access                                      (2 Points)                [ ] General surgery lasting more than 45 minutes   (2 Points)       [ ] Stroke (in the previous month)                          (5 Points)               [ ] Elective arthroplasty                                         (5 Points)                                                                                                                                               HEMATOLOGY RELATED FACTORS                                                 TRAUMA RELATED RISK FACTORS  [ ] Prior episodes of VTE                                     (3 Points)                [ ] Fracture of the hip, pelvis, or leg                       (5 Points)  [ ] Positive family history for VTE                         (3 Points)                 [ ] Acute spinal cord injury (in the previous month)  (5 Points)  [ ] Prothrombin 19228 A                                     (3 Points)                 [ ] Paralysis  (less than 1 month)                             (5 Points)  [ ] Factor V Leiden                                             (3 Points)                  [ ] Multiple Trauma within 1 month                        (5 Points)  [ ] Lupus anticoagulants                                     (3 Points)                                                           [ ] Anticardiolipin antibodies                               (3 Points)                                                       [ ] High homocysteine in the blood                      (3 Points)                                             [ ] Other congenital or acquired thrombophilia      (3 Points)                                                [ ] Heparin induced thrombocytopenia                  (3 Points)                                        Caprini Score 0 - 2:  Low Risk, No VTE Prophylaxis required for most patients, encourage ambulation  Caprini Score 3 - 6:  At Risk, pharmacologic VTE prophylaxis is indicated for most patients (in the absence of a contraindication)  Caprini Score Greater than or = 7:  High Risk, pharmacologic VTE prophylaxis is indicated for most patients (in the absence of a contraindication)

## 2020-03-25 NOTE — PATIENT PROFILE ADULT - MONEY FOR FOOD
ORTHOPAEDIC DISCHARGE SUMMARY     Date of Admission: 3/28/2019  Date of Discharge: 3/30/19  Disposition: Home  Staff Physician: Monie Dai MD  Primary Care Provider: Emerson Spear    DISCHARGE DIAGNOSIS:  Arthritis Of Right Knee    PROCEDURES: Procedure(s):  Right Total Knee Arthroplasty on 3/28/2019    BRIEF HISTORY:  This is a 62F, s/p left PFA, b/l knee scop, b/l subtalar fusion, who presented with right knee pain due to osteoarthritis. She failed extensive conservative management and wished for more definitive treatment. After discussing the risks, benefits, and alternatives, she elected to proceed with a total knee replacement.    HOSPITAL COURSE:    Surgery was uncomplicated. Nisha Perez has done well post-operatively. Medicine was consulted post operatively to aid in management of medical comorbidities. See final recommendations below. The patient received routine nursing cares and is medically stable. Vital signs are stable. The patient is tolerating a regular diet without GI distress/nausea or vomiting. Voiding spontaneously. All PT/OT goals have been met for safe mobility. Pain is now controlled on oral medications which will be available on discharge. Stool softeners have been used while taking pain medications to help prevent constipation. Nisha Perez is deemed medically safe to discharge.     Antibiotics:  IV cefazolin given periop and 24 hours postop.  DVT prophylaxis:  Ambulatory with SCD's  PT Progress:  Has met PT/OT goals for safe mobility.   Pain Meds:  Weaned off all IV pain meds by discharge.  Inpatient Events: No significant events or complications.     Discharge orders and instructions as below.    FOLLOWUP:    Future Appointments   Date Time Provider Department Center   3/30/2019 11:00 AM Laura Friend PT [facility] [place]   3/30/2019  1:30 PM Laura Friend PT [facility] [place]   5/2/2019  9:00 AM Nurse, [facility]   5/10/2019 10:30 AM Damian  Mehran Phillips MD [facility]   5/10/2019 12:00 PM Saira Steele OT [facility]   5/28/2019 11:45 AM Monie Dai MD [facility]       Appointments are at the [facility] ([address] [place] [address], [id number]). Call [phone] if you haven't heard regarding these appointments within 7 days of discharge.    PLANNED DISCHARGE ORDERS:     DVT Prophylaxis: Ambulatory      Current Discharge Medication List      START taking these medications    Details   acetaminophen (TYLENOL) 325 MG tablet Take 3 tablets (975 mg) by mouth every 8 hours  Qty: 100 tablet, Refills: 0    Associated Diagnoses: Status post knee surgery      aspirin (ASA) 325 MG EC tablet Take 1 tablet (325 mg) by mouth daily  Qty: 30 tablet, Refills: 0    Associated Diagnoses: Status post knee surgery      HYDROmorphone (DILAUDID) 2 MG tablet Take 1-2 tablets (2-4 mg) by mouth every 3 hours as needed for moderate to severe pain  Qty: 40 tablet, Refills: 0    Associated Diagnoses: Status post knee surgery      ondansetron (ZOFRAN-ODT) 4 MG ODT tab Take 1 tablet (4 mg) by mouth every 4 hours as needed for nausea or vomiting  Qty: 30 tablet, Refills: 0    Associated Diagnoses: Status post knee surgery      order for DME Equipment being ordered: CPM: Advance as tolerated. Range 0-90 degree flexion  Treatment Diagnosis: Status post Total knee replacement  Qty: 1 Units, Refills: 0    Associated Diagnoses: Status post knee surgery      senna-docusate (SENOKOT-S/PERICOLACE) 8.6-50 MG tablet Take 2 tablets by mouth 2 times daily  Qty: 120 tablet, Refills: 0    Associated Diagnoses: Status post knee surgery         CONTINUE these medications which have NOT CHANGED    Details   dexlansoprazole (DEXILANT) 60 MG CPDR CR capsule Take 1 capsule (60 mg) by mouth daily  Qty: 90 capsule, Refills: 3    Associated Diagnoses: Gastroesophageal reflux disease without esophagitis      diphenhydrAMINE (BENADRYL) 25 MG tablet Take 25 mg by mouth  every 6 hours as needed.      fluticasone (FLONASE) 50 MCG/ACT nasal spray Spray 1 spray into both nostrils 2 times daily      Pseudoephedrine HCl (SUDAFED PO) Take  by mouth at onset of headache.      zolpidem ER (AMBIEN CR) 6.25 MG CR tablet Take 6.25 mg by mouth nightly as needed for sleep               Discharge Procedure Orders   Home care nursing referral   Referral Type: Home Health Therapies & Aides   Number of Visits Requested: 1     Physical Therapy Referral   Standing Status: Future Standing Exp. Date: 03/29/20   Referral Type: Rehab Therapy Physical Therapy   Number of Visits Requested: 1     MD face to face encounter   Order Comments: Documentation of Face to Face and Certification for Home Health Services    I certify that patient: Nisha Perez is under my care and that I, or a nurse practitioner or physician's assistant working with me, had a face-to-face encounter that meets the physician face-to-face encounter requirements with this patient on: March 29, 2019.    This encounter with the patient was in whole, or in part, for the following medical condition, which is the primary reason for home health care: Right Knee Replacemet.    I certify that, based on my findings, the following services are medically necessary home health services: Nursing and Physical Therapy.    My clinical findings support the need for the above services because: Nurse is needed: For complex aftercare of surgical procedures because the patient needs instruction and cannot perform care on their own due to: right knee replacement and Physical Therapy Services are needed to assess and treat the following functional impairments: balance and mobility.    Further, I certify that my clinical findings support that this patient is homebound (i.e. absences from home require considerable and taxing effort and are for medical reasons or [employer] services or infrequently or of short duration when for other reasons) because: Requires  assistance of another person or specialized equipment to access medical services because patient: Is unable to operate assistive equipment on their own., Range of motion limitations prevents ability to exit home safely. and Requires supervision of another for safe transfer...    Based on the above findings. I certify that this patient is confined to the home and needs intermittent skilled nursing care, physical therapy and/or speech therapy.  The patient is under my care, and I have initiated the establishment of the plan of care.  This patient will be followed by a physician who will periodically review the plan of care.  Physician/Provider to provide follow up care: Emerson Spear    Attending hospital physician (the Medicare certified [facility] provider): Monie Dai MD  Physician Signature: See electronic signature associated with these discharge orders.  Date: 3/29/2019     Reason for your hospital stay   Order Comments: Total knee replacement     Discharge Instructions   Order Comments: TOTAL KNEE ARTHROPLASY POST OPERATIVE DISCHARGE INSTRUCTIONS    FOLLOW UP APPOINTMENT  You are scheduled for a post operative wound check with Dr. Dai's nurse approximately two weeks after surgery. At approximately six weeks after surgery, you will see Dr. Dai in clinic.     Your follow up appointments will be at the location that you regularly see Dr. Dai:    [facility]  [address] [place] [address] [phone]    [facility]  [address] [place] [address] [phone]    Physical therapy:   A prescription for physical therapy will be sent home with you. You will also receive a physical therapy protocol in your after visit summary. These documents must be taken to your first therapy visit.      ACTIVITY  Weight bearing status:   You are allowed to weight bear as tolerated on your operative leg using assistive devices  (crutches) as needed.     Continuous passive motion (CPM) machine:   Perform CPM exercises for six to eight hours per day for the first four weeks after surgery. The CPM should be set at 0 degrees to flexion tolerance with goal of 90 degrees. Advance the CPM settings aggressively in increments of 5 degrees every 30 minutes until desired goal is achieved. After four weeks, the CPM machine can be returned.    Hinged knee brace:  None    Exercises:   Perform the following exercises at least three times per day for the first four weeks after surgery to prevent complications, such as blood clots in your legs:  1) Point and flex your feet  2) Move your ankle around in big circles  3) Wiggle your toes   Also, perform thigh muscle tightening exercises for 10 to 15 minutes at least three times per day for the first four weeks after surgery.    Athletic Activities:  Activities such as swimming, bicycling, jogging, running, and stop-and-go sports should be avoided until permitted by your provider.    Driving:  Driving is not permitted until directed by your provider. Typically, driving is restricted for three to four weeks after right knee surgery and three weeks after left knee surgery. Under no circumstance are you permitted to drive while using narcotic pain medications.    Return to Work:  You may return to work when directed by your provider. Typically, patients with desk/sitting jobs can return to work within two weeks while patients with heavy labor jobs can return to work around three months after surgery.      COMFORT AND PAIN MANAGEMENT  Elevation:   During times of inactivity throughout the first two weeks after surgery, make an effort to decrease swelling by elevating your operative extremity. This is most effectively done by lying down and placing several pillows lengthwise under your thigh and calf to raise your toes above the level of your nose. To ensure that your knee remains in full extension, do not place  pillows directly under your knee.     Icing:  An ice pack will be provided to control swelling and discomfort after surgery. Place a thin towel on your skin and apply the ice pack overtop. You may apply ice for 20 minutes as often as two times per hour.    Pain Medications:  You will be discharged with acetaminophen (Tylenol) and a narcotic medication for pain management after surgery. Acetaminophen can help to effectively manage pain when used as prescribed, however, do not exceed the maximum daily dose of 3000 mg. The narcotic pain medication should be reserved for severe, breakthrough pain. Take the narcotic medication as prescribed and use only as often as necessary.       ANTICOAGULATION  Depending on your risk factors, your provider may prescribe aspirin to prevent blood clots. If prescribed, take aspirin daily for the first four weeks after surgery.      WOUND CARE AND SHOWERING  Wound care:  Keep the initial post-op dressing on, clean, and dry for the first 24 hours after surgery. You may then remove the dressing and replace it with a fresh ABD and tubigrip/ACE wrap per the instructions of your discharge nurse. Your surgical incision was closed with Dermabond (a surgical adhesive that is directly on the incision areas). The Dermabond should be left on until it falls off or is removed at your first office visit. Under no circumstance should you pick or scratch your incision.    Showering:  You may shower three days after surgery provided your incision is intact and dry without drainage. If there is fluid at the incision site, cover the incision with a non-permeable plastic bag. You may allow water to run over the incision, but do not soak or submerge the incision. Do not scrub the incision.     Tub Bathing:  Tub bathing, swimming, or any other activities that cause your incision to be submerged should be avoided until allowed by your provider. Typically, patients are allowed to return to these activities four  weeks after surgery.      CONTACTING YOUR PHYSICIAN:  You may experience symptoms that require follow-up before your scheduled two week appointment. Please contact Dr. Dai's office if you experience:  1) Pain in your knee that persists or worsens in the first few days after surgery  2) Excessive redness or drainage of cloudy or bloody material from the wounds (clear red tinted fluid and some mild drainage should be expected) or drainage of any kind five days after surgery  3) A temperature elevation greater than 101.5 F   4) Pain, swelling or redness in your calf  5) Numbness or weakness in your leg or foot      Regular business hours (Monday - Friday, 8am - 5pm):  [facility]: [phone]  [facility]: [phone]    After hours and weekends:  [facility] on call Orthopedic resident: [phone]  IMPORTANT: Resident cannot prescribe narcotic         Asael Batista, PGY4           yes

## 2020-03-25 NOTE — ED PROVIDER NOTE - PMH
Asthma    Guillain BarrÃ© syndrome    Guillain-[place]    HIV (human immunodeficiency virus infection)    HIV (human immunodeficiency virus infection)  from birth

## 2020-03-25 NOTE — H&P ADULT - NSICDXPASTSURGICALHX_GEN_ALL_CORE_FT
PAST SURGICAL HISTORY:  History of orthopedic surgery left arm    No significant past surgical history

## 2020-03-25 NOTE — ED ADULT NURSE NOTE - CHPI ED NUR SYMPTOMS NEG
no blurred vision/no dizziness/no loss of consciousness/no nausea/no numbness/no vomiting/no change in level of consciousness/no confusion/no fever

## 2020-03-25 NOTE — ED ADULT NURSE NOTE - PMH
Asthma    Guillain BarrÃ© syndrome    Guillain-[place]    HIV (human immunodeficiency virus infection)    HIV (human immunodeficiency virus infection)  from birth

## 2020-03-25 NOTE — ED ADULT NURSE NOTE - OBJECTIVE STATEMENT
Pt. presents AxOx4 to ED complaining of LLE weakness. CODE STROKE activated. Pt. was d/c'd earlier from [facility] s/p heroin overdose. Pt. was found on floor by [facility], revived with narcan. Pt. denies heroin use however was resuscitated with narcan. Pt. states he has seizures and that's why he was found on the floor earlier. Pt. states he then went to a hotel and smoked marijuana, then went to get coffee. Pt. states when he was walking he was unable to move his left leg and step up onto the curb. Pt. states he sat down on the curb and called 911. At this time, pt complains of LLE weakness, noted 4/5 strength in LLE, 5/5 strength in RLE. Pt. denies dizziness, vision changes. Sensory intact. PERRL. No noted facial droop.

## 2020-03-25 NOTE — ED PROVIDER NOTE - PHYSICAL EXAMINATION
gregg shay General: In NAD, non-toxic/well-appearing; well nourished/developed.  Skin: No rashes or lesions. Warm, dry, color normal for race. No cyanosis.  Head: Normocephalic/atraumatic.   Eyes: Lids symmetrical. Sclera anicteric, conjunctivae clear b/l. No discharge. PERRLA, EOMI. Cornea/lens without opacities.  Neck: Supple, FROM.   Cardio: No lifts, heaves, visible pulsations. No thrills. Rate and rhythm regular, S1 & S2 clear. No audible murmur, gallop, or rub.   PV: No edema of feet/ankles. No calf swelling/tenderness. Pulses: b/l 2+ radial, DP, PT. Capillary refill <2 seconds.  Resp: Normal AP to lateral diameter, symmetrical excursion b/l. Breath sounds vesicular, symmetrical and without rales, rhonchi or wheezing b/l.  Abd: Non-distended. Soft, non-tender, no masses palpated. No rebound, guarding.   MSK/Neuro: A&Ox3. CN II-XII grossly intact. Cooperative, pleasant. Affect appropriate, thought, speech, and language coherent. No slurred speech. FROM. Decreased strength LLE with drift. Brisk patellar and achilles DTR b/l. Sensation intact to bilateral upper and lower extremities. Normal point-to-point test. Unable to assess gait.

## 2020-03-25 NOTE — ED PROVIDER NOTE - ATTENDING CONTRIBUTION TO CARE
29yo with pmh HIV VL undetectable and CD4 count 300s, GBS, recurrent ha presents with LLE weakness   no bowel/bladder incontinence. Pt in triage was triaged as level 4 for LE pain but pt when evaluated states LLE weakness sudden onset unable to walk, denies pain. Pt with drift. no other neuro deficit, TPA given, admit to neuro ICU for further management

## 2020-03-25 NOTE — H&P ADULT - NSHPPHYSICALEXAM_GEN_ALL_CORE
Constitutional: WN, WD in NAD    Neuro  * Mental Status:  GCS 15: E4, V5, M6 Awake, alert, oriented to conversation.  * Cranial Nerves: Cnii-Cnxii grossly intact. PERRL, EOMI, tongue midline, no gaze deviation, VF grossly intact  * Motor: RUE 5/5, LUE 5/5, RLE 5/5, LLE 5/5, No drift  * Sensory: Sensation intact to light touch  * Reflexes:  + b/l Knee & Achilles reflexes, Slightly dercease on Left knee  * Gait: Not assessed    Cardiovascular:  S1, S2 no murmurs appreciated.  Regular rate and rhythm.  Eyes: See neurologic examination with detailed examination of eyes.  ENT: No JVD, Trachea Midline.  Respiratory: Clear to auscultation.  Gastrointestinal: Soft, nontender, nondistended.  Extremities: No pretibial edema appreciated, no appreciable calf tenderness.  Skin: no skin breakdown, no edema, pulses +2  Musculoskeletal: See detailed muscle strength examination, listed under neurologic examination.

## 2020-03-25 NOTE — CONSULT NOTE ADULT - SUBJECTIVE AND OBJECTIVE BOX
[facility]                                     Neu[facility] at [place]                                 Jolanta Marte, & Rene                                  [address]                                        [place], [place], [address]                                             [phone]    CC: left leg weakness  HPI:    The patient is a 30y Male who presented with acute onset of left leg weakness with last know well at 0230 this morning.  he has history of GBS in past with mild residual b/l leg weakness, but noted worsening left leg weakness today and presented to the ED for evaluation.  He had an NIHSS=2 and was given alteplase for acute neurological symptoms with time of onset within 4.5 hours.  CTA did not show LVO and he was not a candidate for neuro-intervention.  Neurology is asked to follow.    PAST MEDICAL & SURGICAL HISTORY:  HIV (human immunodeficiency virus infection)  Asthma  Guillain-[place]  Guillain BarrÃ© syndrome  HIV (human immunodeficiency virus infection): from birth  No significant past surgical history  History of orthopedic surgery: left arm      MEDICATIONS  (STANDING):  atorvastatin 40 milliGRAM(s) Oral at bedtime  famotidine    Tablet 20 milliGRAM(s) Oral two times a day  QUEtiapine 100 milliGRAM(s) Oral daily  QUEtiapine 200 milliGRAM(s) Oral at bedtime  sodium chloride 0.9%. 1000 milliLiter(s) (75 mL/Hr) IV Continuous <Continuous>  tenofovir alafenamide 10 mG/[id number] mG/cobicistat 150 mG/emtricitabine 200 mG (GENVOYA) 1 Tablet(s) Oral daily    MEDICATIONS  (PRN):      Allergies    Ceclor (Unknown)    Intolerances        SOCIAL HISTORY:  (+) tob about 1/2 ppd  no alcohol   no drugs    FAMILY HISTORY:  Unable to say, adopted at birth      ROS: 14 point ROS negative other than what is present in HPI or below  left leg weakness    Vital Signs Last 24 Hrs  T(C): 37.2 (25 Mar 2020 12:00), Max: 37.2 (25 Mar 2020 12:00)  T(F): 98.9 (25 Mar 2020 12:00), Max: 98.9 (25 Mar 2020 12:00)  HR: 106 (25 Mar 2020 11:30) (91 - 195)  BP: 127/60 (25 Mar 2020 11:30) (124/72 - 171/80)  BP(mean): 86 (25 Mar 2020 11:30) (83 - 113)  RR: 21 (25 Mar 2020 11:30) (15 - 24)  SpO2: 98% (25 Mar 2020 11:30) (92% - 100%)      General: NAD    Detailed Neurologic Exam:    Mental status: The patient is awake and alert and has normal attention span.  The patient is fully oriented in 3 spheres. The patient is oriented to current events. The patient is able to name objects, follow commands, repeat sentences.    Cranial nerves: Pupils equal and react symmetrically to light. There is no visual field deficit to confrontation. Extraocular motion is full with no nystagmus. There is no ptosis. Facial sensation is intact. Facial musculature is symmetric. Palate elevates symmetrically. Shoulder shrug is normal. Tongue is midline.    Motor: There is normal bulk and tone.  There is no tremor.  Strength is 5/5 in the right arm and leg.   Strength is 5/5 in the left arm and 3/5 left leg    Sensation: Intact to light touch and pin in 4 extremities    Reflexes: 2+ throughout and plantar responses are flexor.    Cerebellar: There is no dysmetria on finger to nose testing.    Gait : deferred    [facility] SS:   Date: 3/25/2020  Time: 1145  1a) Level of consciousness (0-3): 0  1b) Questions (0-2): 0  1c) Commands (0-2): 0  2  ) Gaze (0-2): 0  3  ) Visual field (0-3): 0  4  ) Facial palsy (0-3): 0  Motor  5a) Left arm (0-4): 0  5b) Right arm (0-4): 0  6a) Left leg (0-4): 2  6b) Right leg (0-4): 0  7  ) Ataxia (0-2): 0  Sensory  8  ) Sensory (0-2): 0  Speech  9  ) Language (0-3): 0  10) Dysarthria (0-2): 0  Extinction  11) Extinction/inattention (0-2): 0    Total score: 2      LABS:                         10.8   9.87  )-----------( 179      ( 25 Mar 2020 06:01 )             33.8       03-25    130<L>  |  92<L>  |  22.0<H>  ----------------------------<  102<H>  4.0   |  27.0  |  1.11    Ca    8.3<L>      25 Mar 2020 06:01    TPro  6.7  /  Alb  3.6  /  TBili  0.5  /  DBili  x   /  AST  34  /  ALT  21  /  AlkPhos  50  03-25      PT/INR - ( 25 Mar 2020 06:01 )   PT: 14.5 sec;   INR: 1.27 ratio         PTT - ( 25 Mar 2020 06:01 )  PTT:31.9 sec        RADIOLOGY & ADDITIONAL STUDIES (independently reviewed unless otherwise noted):  head CT no acute stroke, mass or blood  CTA head: no aneurysm, AVM, LVO or sig stenosis in COW  CTA neck: no sig carotid or vertebral stenosis  CT Perfusion head - CBF<30% volume 0ml, Tmax>6s volume =0ml

## 2020-03-25 NOTE — ED ADULT NURSE NOTE - CHIEF COMPLAINT QUOTE
Pt. BIBA complaining of bilateral leg pain that began within the last hour.  Pt. recently discharged from [facility] ED earlier tonight.

## 2020-03-25 NOTE — H&P ADULT - HISTORY OF PRESENT ILLNESS
30M PMHx HIV, Guillain [place] since 2017 w/ residual b/l LE weakness Left >RIght, ambulates independently at baseline, but this time unable to ambulate at all since 8pm last night. Presents to ED c/o b/l leg weakness L>R. Patient states cannot feel legs; states tingling from toes to back of legs extending upward to back. States suddenly began while walking, unable to ambulate secondary to weakness. States feels similar to Guillain [place] flare but at that time included b/l LE weakness, last flare January 2017. Unknown CD4 count, viral load undetectable. Patient No further complaints at this time.     25-Mar-2020 05:29	  25-Mar-2020 05:40	  25-Mar-2020 05:44	  No acute findings tpa administered 06:02	    NIHSS=2 (might be baseline)

## 2020-03-25 NOTE — PROGRESS NOTE ADULT - SUBJECTIVE AND OBJECTIVE BOX
HPI:  30M PMHx HIV, Guillain [place] since  w/ residual b/l LE weakness Left >RIght, ambulates independently at baseline, but this time unable to ambulate at all since 8pm last night. Presents to ED c/o b/l leg weakness L>R. Patient states cannot feel legs; states tingling from toes to back of legs extending upward to back. States suddenly began while walking, unable to ambulate secondary to weakness. States feels similar to Guillain [place] flare but at that time included b/l LE weakness, last flare 2017. Unknown CD4 count, viral load undetectable. Patient No further complaints at this time.     · Stroke Team Assessment	25-Mar-2020 05:29	  · CT Performed	25-Mar-2020 05:40	  · CT Read	25-Mar-2020 05:44	  · CT Reading	No acute findings	    NIHSS=2 (might be baseline) (25 Mar 2020 08:01)    Past Medical History, Family History, Social History:  PAST MEDICAL & SURGICAL HISTORY:  HIV (human immunodeficiency virus infection)  Asthma  Guillain-Astoria  Guillain BarrÃ© syndrome  HIV (human immunodeficiency virus infection): from birth  No significant past surgical history  History of orthopedic surgery: left arm    FAMILY HISTORY:  No pertinent family history in first degree relatives    Social History:  [place] /day since age 17  Drugs: occasional Marijuana use  ETOH; denies (25 Mar 2020 08:01)    Interval History:  24 Hour Events: No acute events overngith, patient is still on neuro checks q1hr s/p tpa    1. Patient's Neurologic Exam unchanged.    2. Patient's Hemodynamic's maintained with no pressors    3. Patient's Respiratory status is: stable on RA    4. Patient is pending: Ct head at 6am    5. Dispo: NCCU -> floor       Review of Systems:  Constitutional:  Pain well controlled with PRNs, no fevers, chills, or new weakness  Eyes: No double vision, no change in visual acuity, no blurry vision, no occular discharge  Neurologic: No new weakness, no seizure reported, headaches well controlled with PRN medications  Ears, nose, mouth throat:  No ottorrhea. No change in hearing, No anosmia, No oral lesions, No sore throat  Cardiovascular: No palpitations, no chest pain, no nocturnal or positional dyspnea.  Respiratory: No shortness of breath, No Cough  Gastrointestinal: No change in bowel habits, no change in appetite  Genitourinary: No Frequency, No Dysuria, no Hematuria  Musculoskeletal: No muscle wasting, No new weakness  Psych: No changes in mood, Denies drug use, Denies history of psyciatric illness  Integumentary: Denies new skin lesions  Endocrine: Denies history of DM, denies history of thyroid disease, No heat or cold intolerance  Heme/Lymph: No use of antiplatelet/anticoagulant  Allergic / Immune: No active allergies unless otherwise specified in medical chart    All other systems reviewed and are negative    Physical Exam:  Costitutional: WN/WD in NAD    	Neuro  	* Mental Status:  GCS 15: E4, V5, M6 Awake, alert, oriented to conversation.  	* Cranial Nerves: Cnii-Cnxii grossly intact. PERRL, EOMI, tongue midline, no gaze deviation, VF grossly intact  	* Motor: RUE 5/5, LUE 5/5, RLE 5/5, LLE 5/5, No drift  	* Sensory: Sensation intact to light touch  	* Reflexes:  + b/l Knee & Achilles reflexes, Slightly dercease on Left knee  	* Gait: Not assessed    	Cardiovascular:  S1, S2 no murmurs appreciated.  Regular rate and rhythm.  	Eyes: See neurologic examination with detailed examination of eyes.  	ENT: No JVD, Trachea Midline.  	Respiratory: Clear to auscultation.  	Gastrointestinal: Soft, nontender, nondistended.  	Extremities: No pretibial edema appreciated, no appreciable calf tenderness.  	Skin: no skin breakdown, no edema, pulses +2  Musculoskeletal: See detailed muscle strength examination, listed under neurologic examination.    Vital Signs Last 24 Hrs  T(C): 36.8 (25 Mar 2020 16:10), Max: 37.2 (25 Mar 2020 12:00)  T(F): 98.3 (25 Mar 2020 16:10), Max: 98.9 (25 Mar 2020 12:00)  HR: 90 (25 Mar 2020 21:00) (90 - 114)  BP: 126/68 (25 Mar 2020 21:00) (107/54 - 171/80)  BP(mean): 91 (25 Mar 2020 21:00) (74 - 113)  RR: 15 (25 Mar 2020 21:00) (14 - 45)  SpO2: 100% (25 Mar 2020 21:00) (92% - 100%)    I&O's Summary    25 Mar 2020 07:01  -  25 Mar 2020 22:41  --------------------------------------------------------  IN: 1095 mL / OUT: 900 mL / NET: 195 mL      Labs & Radiology:                        10.8   9.87  )-----------( 179      ( 25 Mar 2020 06:01 )             33.8       03-25    130<L>  |  92<L>  |  22.0<H>  ----------------------------<  102<H>  4.0   |  27.0  |  1.11    Ca    8.3<L>      25 Mar 2020 06:01    TPro  6.7  /  Alb  3.6  /  TBili  0.5  /  DBili  x   /  AST  34  /  ALT  21  /  AlkPhos  50  03-25    LIVER FUNCTIONS - ( 25 Mar 2020 06:01 )  Alb: 3.6 g/dL / Pro: 6.7 g/dL / ALK PHOS: 50 U/L / ALT: 21 U/L / AST: 34 U/L / GGT: x         CARDIAC MARKERS ( 25 Mar 2020 06:01 )  x     / <0.01 ng/mL / x     / x     / x      PT/INR - ( 25 Mar 2020 06:01 )   PT: 14.5 sec;   INR: 1.27 ratio    PTT - ( 25 Mar 2020 06:01 )  PTT:31.9 sec    Neurosurgery Imaging:  < from: MR Head No Cont (20 @ 18:57) >  IMPRESSION:    1)  no diffusion restriction or MR evidence of acute ischemia. Periventricular white matter findings raise the possibility of an underlying inflammatory etiology and/or demyelinating disease. Further workup and assessment recommended..  2)  mucosal thickening noted in the sinuses. Mastoids are clear..       LINDSEY FIGUEROA M.D., ATTENDING RADIOLOGIST  This document has been electronically signed. Mar 25 2020  6:51PM      < end of copied text >    Medications:  MEDICATIONS  (STANDING):  atorvastatin 40 milliGRAM(s) Oral at bedtime  famotidine    Tablet 20 milliGRAM(s) Oral two times a day  QUEtiapine 100 milliGRAM(s) Oral daily  QUEtiapine 200 milliGRAM(s) Oral at bedtime  sodium chloride 0.9%. 1000 milliLiter(s) (75 mL/Hr) IV Continuous <Continuous>  tenofovir alafenamide 10 mG/xmomkuqhijbg643 mG/cobicistat 150 mG/emtricitabine 200 mG (GENVOYA) 1 Tablet(s) Oral daily    MEDICATIONS  (PRN):    Assessment:  29yo M with aborted CVA s/p tPa  HIV  GBS    Neuro  Cont neuro checks & VS q1hr  Neurology eval appreciated  stroke w/u  CT head today at 6am (24hr post tPa)  MRI of the brain done - no CVA  cont Seroquel 100am/200pm (home meds)    Resp  Keep O2 sat >92%  IS    CV  Keep BP <180, labetalol prn  3/25 Echo EF=65-70%, no valvular abnormality  started on atorvastatin  starte ASA tomorrow after head Ct 24hr post tPa      IVL  voding  supplement electrolytes as needed    GI  Tolerating reg diet  pepcid for GI prophylaxis    Heme  cont HIV home med  mechanical DVT prophylaxis   start chemichal DVT prophylaxis today after head CT    Endo  VxZ5E=9.6  Lipid: phmn=552, xl=536, HDL=33, LDL=74  TSH=1.39  SS    DIspo  NCCU, trf to floor neurology service if CT head stable  Full code    Images & labs reviewed  Case discussed w/ Dr Maza

## 2020-03-25 NOTE — ED ADULT TRIAGE NOTE - CHIEF COMPLAINT QUOTE
Pt. BIBA complaining of bilateral leg pain that began within the last hour.  Pt. recently discharged from [facility] ED earlier tonight.

## 2020-03-26 DIAGNOSIS — Z71.89 OTHER SPECIFIED COUNSELING: ICD-10-CM

## 2020-03-26 PROCEDURE — 99223 1ST HOSP IP/OBS HIGH 75: CPT

## 2020-03-26 PROCEDURE — 99233 SBSQ HOSP IP/OBS HIGH 50: CPT

## 2020-03-26 PROCEDURE — 99231 SBSQ HOSP IP/OBS SF/LOW 25: CPT

## 2020-03-26 PROCEDURE — 70450 CT HEAD/BRAIN W/O DYE: CPT | Mod: 26

## 2020-03-26 RX ORDER — ASPIRIN/CALCIUM CARB/MAGNESIUM 324 MG
81 TABLET ORAL DAILY
Refills: 0 | Status: DISCONTINUED | OUTPATIENT
Start: 2020-03-26 | End: 2020-03-31

## 2020-03-26 RX ORDER — HEPARIN SODIUM 5000 [USP'U]/ML
5000 INJECTION INTRAVENOUS; SUBCUTANEOUS EVERY 8 HOURS
Refills: 0 | Status: DISCONTINUED | OUTPATIENT
Start: 2020-03-26 | End: 2020-04-03

## 2020-03-26 RX ORDER — ATORVASTATIN CALCIUM 80 MG/1
20 TABLET, FILM COATED ORAL AT BEDTIME
Refills: 0 | Status: DISCONTINUED | OUTPATIENT
Start: 2020-03-26 | End: 2020-03-31

## 2020-03-26 RX ADMIN — ATORVASTATIN CALCIUM 20 MILLIGRAM(S): 80 TABLET, FILM COATED ORAL at 20:43

## 2020-03-26 RX ADMIN — QUETIAPINE FUMARATE 200 MILLIGRAM(S): 200 TABLET, FILM COATED ORAL at 20:43

## 2020-03-26 RX ADMIN — HEPARIN SODIUM 5000 UNIT(S): 5000 INJECTION INTRAVENOUS; SUBCUTANEOUS at 20:43

## 2020-03-26 RX ADMIN — Medication 81 MILLIGRAM(S): at 11:49

## 2020-03-26 RX ADMIN — QUETIAPINE FUMARATE 100 MILLIGRAM(S): 200 TABLET, FILM COATED ORAL at 11:45

## 2020-03-26 RX ADMIN — HEPARIN SODIUM 5000 UNIT(S): 5000 INJECTION INTRAVENOUS; SUBCUTANEOUS at 15:39

## 2020-03-26 RX ADMIN — FAMOTIDINE 20 MILLIGRAM(S): 10 INJECTION INTRAVENOUS at 05:49

## 2020-03-26 RX ADMIN — ELVITEGRAVIR, COBICISTAT, EMTRICITABINE, AND TENOFOVIR ALAFENAMIDE 1 TABLET(S): 150; 150; 200; 10 TABLET ORAL at 11:45

## 2020-03-26 NOTE — SPEECH LANGUAGE PATHOLOGY EVALUATION - SLP GENERAL OBSERVATIONS
Pt received & seen seated upright in bed, +asleep, +easily awoken & agreeable to assessment, +oriented, 0/10 pain

## 2020-03-26 NOTE — PROGRESS NOTE ADULT - ASSESSMENT
ASSESSMENT/PLAN:  30yMale presenting with ischemic stroke s/p TPA treatment  stable condition HD well    Neuro: stable, continue with NC q4, downgraded to tele , continue with ASA per neuro     CV: NSR, stable, no changes to med     Pulm: maintaining stats on RA     GI/Nutrition: cont with reg diet     /Renal: cont to monitor I & Os, electrolytes acceptable no changes at this time     ID: no issues     Lines/Tubes: maintain PIV       Endo: no issues     Skin: intact     Proph: heparin started and SCD on     Dispo: downgrade to floor level of care with tele monitoring and continued NCs  transfer level of care  sign out provided to hospitalist on call.  RN and logistics aware of     CRITICAL CARE TIME SPENT:

## 2020-03-26 NOTE — PHYSICAL THERAPY INITIAL EVALUATION ADULT - DIAGNOSIS, PT EVAL
Impaired Functional Mobility Impaired Functional Mobility, presenting with ischemic stroke s/p TPA treatment.

## 2020-03-26 NOTE — PHYSICAL THERAPY INITIAL EVALUATION ADULT - ADDITIONAL COMMENTS
as per pt. : lives with friends 5 steps to enter (+) rail, 5 steps to the living area (+) rail, (-) DME, friends available to assist as needed upon D/C home

## 2020-03-26 NOTE — SPEECH LANGUAGE PATHOLOGY EVALUATION - COMMENTS
As per MD note:   "30yMale presenting with ischemic stroke s/p TPA treatment" Informally judged to be WFL Pt reports speech to be consistent with pre-morbid status

## 2020-03-26 NOTE — PROGRESS NOTE ADULT - SUBJECTIVE AND OBJECTIVE BOX
[facility]                                     [facility] [place]                                 Jolanta Marte, & Rene                                  [address]                                        [place], [place], [address]                                             [phone]    CC: left leg weakness  HPI:   The patient is a 30y Male who presented with acute onset of left leg weakness with last know well at 0230 this morning.  he has history of GBS in past with mild residual b/l leg weakness, but noted worsening left leg weakness today and presented to the ED for evaluation.  He had an NIHSS=2 and was given alteplase for acute neurological symptoms with time of onset within 4.5 hours.  CTA did not show LVO and he was not a candidate for neuro-intervention.  Neurology is asked to follow.    Interval history: moving left leg a bit better, repeat head CT no blood, MRI brain no acute CVA    ROS neurology: Denies headache or dizziness. c/o left leg weakness.  no numbness.  Denies speech/language deficits. Denies diplopia/blurred vision.  Denies confusion    MEDICATIONS  (STANDING):  aspirin enteric coated 81 milliGRAM(s) Oral daily  atorvastatin 20 milliGRAM(s) Oral at bedtime  heparin  Injectable 5000 Unit(s) SubCutaneous every 8 hours  QUEtiapine 100 milliGRAM(s) Oral daily  QUEtiapine 200 milliGRAM(s) Oral at bedtime  tenofovir alafenamide 10 mG/bczfmjdveyob683 mG/cobicistat 150 mG/emtricitabine 200 mG (GENVOYA) 1 Tablet(s) Oral daily    MEDICATIONS  (PRN):      Vital Signs Last 24 Hrs  T(C): 37.2 (26 Mar 2020 11:27), Max: 37.2 (26 Mar 2020 11:27)  T(F): 99 (26 Mar 2020 11:27), Max: 99 (26 Mar 2020 11:27)  HR: 110 (26 Mar 2020 10:00) (90 - 115)  BP: 115/58 (26 Mar 2020 10:00) (101/52 - 137/71)  BP(mean): 80 (26 Mar 2020 10:00) (73 - 96)  RR: 23 (26 Mar 2020 10:00) (14 - 26)  SpO2: 96% (26 Mar 2020 10:00) (96% - 100%)    Detailed Neurologic Exam:    Mental status: The patient is awake and alert and has normal attention span.  The patient is fully oriented in 3 spheres. The patient is oriented to current events. The patient is able to name objects, follow commands, repeat sentences.    Cranial nerves: Pupils equal and react symmetrically to light. There is no visual field deficit to confrontation. Extraocular motion is full with no nystagmus. There is no ptosis. Facial sensation is intact. Facial musculature is symmetric. Palate elevates symmetrically. Shoulder shrug is normal. Tongue is midline.    Motor: There is normal bulk and tone.  There is no tremor.  Strength is 5/5 in the right arm and leg.   Strength is 5/5 in the left arm and 4/5 left leg    Sensation: Intact to light touch and pin in 4 extremities    Reflexes: 2+ throughout and plantar responses are flexor.    Cerebellar: There is no dysmetria on finger to nose testing.    Gait : deferred    LABS:                         10.8   9.87  )-----------( 179      ( 25 Mar 2020 06:01 )             33.8       03-25    130<L>  |  92<L>  |  22.0<H>  ----------------------------<  102<H>  4.0   |  27.0  |  1.11    Ca    8.3<L>      25 Mar 2020 06:01    TPro  6.7  /  Alb  3.6  /  TBili  0.5  /  DBili  x   /  AST  34  /  ALT  21  /  AlkPhos  50  03-25      PT/INR - ( 25 Mar 2020 06:01 )   PT: 14.5 sec;   INR: 1.27 ratio         PTT - ( 25 Mar 2020 06:01 )  PTT:31.9 sec    Lipid Profile (03.25.20 @ 20:44)    Total Cholesterol/HDL Ratio Measurement: 4.0 Ratio    Cholesterol, Serum: 134 mg/dL    Triglycerides, Serum: 136 mg/dL    HDL Cholesterol, Serum: 33: HDL Levels >/= 60 mg/dL are considered beneficial and a "negative" risk  factor.  Effective 08/15/2018: New reference range and interpretive comment. mg/dL    Direct LDL: 74:        RADIOLOGY & ADDITIONAL STUDIES (independently reviewed unless otherwise noted):  Recent neurological studies (images reviewed independently):  head CT- no acute blood, stroke or mass  MRI brain- no acute CVA on DWI, no blood and no mass    Previous neurological studies:  head CT no acute stroke, mass or blood  CTA head: no aneurysm, AVM, LVO or sig stenosis in COW  CTA neck: no sig carotid or vertebral stenosis  CT Perfusion head - CBF<30% volume 0ml, Tmax>6s volume =0ml

## 2020-03-26 NOTE — OCCUPATIONAL THERAPY INITIAL EVALUATION ADULT - FINE MOTOR COORDINATION EXAM
inconsistent findings, upon assessment pt with minimal deficits however through observation pt with increased performance; will continue to assess

## 2020-03-26 NOTE — CHART NOTE - NSCHARTNOTEFT_GEN_A_CORE
SICU TRANSFER NOTE  -----------------------------  ICU Admission Date: XXXXX  Transfer Date: 03-26-20 @ 14:41    Admission Diagnosis: XXXXXXX    Active Problems/injuries: s/p CVA, Hx HIV, Hx guilliane barre    Consultants:  [ ] Cardiology  [ ] Endocrine  [ ] Infectious Disease  [ ] Medicine  [x]Neurosurgery  [x]Neurology  [ ] Ortho       [ ] Weight Bearing Status:  [ ] Palliative       [ ] Advanced Directives:    [ ] Physical Medicine and Rehab       [x] Disposition : transfer to floor  [ ] Plastics  [ ] Pulmonary    Medications  aspirin enteric coated 81 milliGRAM(s) Oral daily  atorvastatin 20 milliGRAM(s) Oral at bedtime  heparin  Injectable 5000 Unit(s) SubCutaneous every 8 hours  QUEtiapine 100 milliGRAM(s) Oral daily  QUEtiapine 200 milliGRAM(s) Oral at bedtime  tenofovir alafenamide 10 mG/cgprhnwdjvao697 mG/cobicistat 150 mG/emtricitabine 200 mG (GENVOYA) 1 Tablet(s) Oral daily    [x] I attest I have reviewed and reconciled all medications prior to transfer    IV Fluids  sodium chloride 0.9%.: Solution, 1000 milliLiter(s) infuse at 75 mL/Hr  Provider's Contact #: [phone]      Antibiotics:  tenofovir alafenamide 10 mG/zassyzviebeg718 mG/cobicistat 150 mG/emtricitabine 200 mG (GENVOYA) 1 Tablet(s) Oral daily    Indication: HIV End Date: N/A      I have discussed this case with Hospitalist upon transfer and all questions regarding ICU course were answered.  The following items are to be followed up:  - PT/OT  - MRI   - anticoagulation

## 2020-03-26 NOTE — OCCUPATIONAL THERAPY INITIAL EVALUATION ADULT - SENSORY TESTS
pt reports bilateral LE numbness/tingling (chronic from Guillain Baisden Syndrome) however reports worsening in recent days; pt with +bilateral pedal pulses

## 2020-03-26 NOTE — PROGRESS NOTE ADULT - ASSESSMENT
The patient is a 30y Male who is followed by neurology because of left leg weakness    Left leg weakness  possible CVA  received altelplase at 0602 3/25/2020    ICU admission for post tPA vitals and neurochecks per protocol is now completed  on ASA 81  repeat head CT  24 hours after tPA administration did not show blood  MRI brain did not show stroke  PT/OT  can keep BP normal  lipid panel, LDL= 74  goal LDL <100 given no stroke on MRI brain  DVT ppx    OK from stroke neuro for downgrade from ICU.    He has history of Guillain [place] Syndrome, no evidence on exam currently for recurrence    HIV  continue home anti-virals     Tobacco use   smokes about 1/2 PPD  smoking cessation was recommended    will follow with you    aKrri Stover MD PhD   [id number]

## 2020-03-26 NOTE — OCCUPATIONAL THERAPY INITIAL EVALUATION ADULT - MODIFIED CLINICAL TEST OF SENSORY INTEGRATION IN BALANCE TEST
dynamic sitting edge of bed with minimum assistance; static/dynamic standing with moderate assistance x 2

## 2020-03-26 NOTE — PROGRESS NOTE ADULT - ASSESSMENT
30 male with hx of HIV, Guillain [place] diagnosed in 2017 with residual b/l LE weakness Left >RIght, ambulates independently at baseline, came to ED with cc of unble to ambulate at all due to worsen b/l leg weakness L>R and unable to feel his legs which suddenly began while walking. Last flare up for Guillain Perris was January 2017. Code stroke was called on arrival & diagnosed with ischemic stroke s/p TPA. Pt was admitted to Neuro critical care team.  Repeat CT head after TPA without evidence of ICH.     1) Acute CVA s/p tPA  - continue aspirin and statin  - neuro following  - PT/OT  - neuro checks    2) HIV  - on tenofovir    3) Depression  - on Seroquel    4) Guillain Perris Syndrome  - outpt follow up    5) Prophylactic measure  - DVT ppx: on heparin SQ

## 2020-03-26 NOTE — PROGRESS NOTE ADULT - SUBJECTIVE AND OBJECTIVE BOX
INTERVAL HPI/OVERNIGHT EVENTS/SUBJECTIVE:   30M PMHx HIV, Guillain [place] since 2017 w/ residual b/l LE weakness Left >RIght, ambulates independently at baseline, but this time unable to ambulate at all since 8pm last night. Presents to ED c/o b/l leg weakness L>R. Patient states cannot feel legs; states tingling from toes to back of legs extending upward to back. States suddenly began while walking, unable to ambulate secondary to weakness. States feels similar to Guillain [place] flare but at that time included b/l LE weakness, last flare January 2017. Unknown CD4 count, viral load undetectable. Patient No further complaints at this time.     Called code stroke on arrival. Diagnosed with ischemic stroke. Seen and evaluated by stroke team and deemed in need of TPA. Admitted to Neurocritical care team. Last 24 hours with stable neuro exams. No acute issues. Repeat CT head after TPA without evidenceof ICH. No eligible for downgrade to tele level of care. Patient care assumed by SICU team this am and seen by SICU attending.   Tolerating reg diet. all labs WNL     ICU Vital Signs Last 24 Hrs  T(C): 36.8 (26 Mar 2020 07:37), Max: 37.2 (25 Mar 2020 12:00)  T(F): 98.2 (26 Mar 2020 07:37), Max: 98.9 (25 Mar 2020 12:00)  HR: 108 (26 Mar 2020 09:00) (90 - 115)  BP: 115/53 (26 Mar 2020 09:00) (101/52 - 143/64)  BP(mean): 76 (26 Mar 2020 09:00) (73 - 96)  ABP: --  ABP(mean): --  RR: 23 (26 Mar 2020 09:00) (14 - 45)  SpO2: 96% (26 Mar 2020 09:00) (96% - 100%)      I&O's Detail    25 Mar 2020 07:01  -  26 Mar 2020 07:00  --------------------------------------------------------  IN:    Oral Fluid: 220 mL    sodium chloride 0.9%: 975 mL  Total IN: 1195 mL    OUT:    Voided: 1800 mL  Total OUT: 1800 mL    Total NET: -605 mL      26 Mar 2020 07:01  -  26 Mar 2020 10:02  --------------------------------------------------------  IN:  Total IN: 0 mL    OUT:    Stool: 1 mL    Voided: 200 mL  Total OUT: 201 mL    Total NET: -201 mL                MEDICATIONS  (STANDING):  aspirin enteric coated 81 milliGRAM(s) Oral daily  atorvastatin 20 milliGRAM(s) Oral at bedtime  heparin  Injectable 5000 Unit(s) SubCutaneous every 8 hours  QUEtiapine 100 milliGRAM(s) Oral daily  QUEtiapine 200 milliGRAM(s) Oral at bedtime  tenofovir alafenamide 10 mG/fiyorjatrzhu527 mG/cobicistat 150 mG/emtricitabine 200 mG (GENVOYA) 1 Tablet(s) Oral daily    MEDICATIONS  (PRN):      NUTRITION/IVF:  Reg,. IVL           MISC:     PHYSICAL EXAM:    Gen: NAD     Eyes: PERRLA     Neurological: gcs 15 no deficits  normal speech, 5/5 UE and LE motor bilateral       Neck: FROM, no TTP, no jvd     Pulmonary:  cta bilat     Cardiovascular: NSR     Gastrointestinal: soft nd nttp     Genitourinary: voiding     Cranial Nerves: Cnii-Cnxii grossly intact. PERRL, EOMI, tongue midline, no gaze deviation, VF grossly intact  	* Motor: RUE 5/5, LUE 5/5, RLE 5/5, LLE 5/5, No drift  	* Sensory: Sensation intact to light touch  	* Gait: Not assessed    Skin: intact               LABS:  CBC Full  -  ( 25 Mar 2020 06:01 )  WBC Count : 9.87 K/uL  RBC Count : 3.59 M/uL  Hemoglobin : 10.8 g/dL  Hematocrit : 33.8 %  Platelet Count - Automated : 179 K/uL  Mean Cell Volume : 94.2 fl  Mean Cell Hemoglobin : 30.1 pg  Mean Cell Hemoglobin Concentration : 32.0 gm/dL  Auto Neutrophil # : 7.39 K/uL  Auto Lymphocyte # : 1.40 K/uL  Auto Monocyte # : 1.03 K/uL  Auto Eosinophil # : 0.00 K/uL  Auto Basophil # : 0.01 K/uL  Auto Neutrophil % : 74.9 %  Auto Lymphocyte % : 14.2 %  Auto Monocyte % : 10.4 %  Auto Eosinophil % : 0.0 %  Auto Basophil % : 0.1 %    03-25    130<L>  |  92<L>  |  22.0<H>  ----------------------------<  102<H>  4.0   |  27.0  |  1.11    Ca    8.3<L>      25 Mar 2020 06:01    TPro  6.7  /  Alb  3.6  /  TBili  0.5  /  DBili  x   /  AST  34  /  ALT  21  /  AlkPhos  50  03-25    PT/INR - ( 25 Mar 2020 06:01 )   PT: 14.5 sec;   INR: 1.27 ratio         PTT - ( 25 Mar 2020 06:01 )  PTT:31.9 sec    RECENT CULTURES:      LIVER FUNCTIONS - ( 25 Mar 2020 06:01 )  Alb: 3.6 g/dL / Pro: 6.7 g/dL / ALK PHOS: 50 U/L / ALT: 21 U/L / AST: 34 U/L / GGT: x           CARDIAC MARKERS ( 25 Mar 2020 06:01 )  x     / <0.01 ng/mL / x     / x     / x          CAPILLARY BLOOD GLUCOSE      RADIOLOGY & ADDITIONAL STUDIES:

## 2020-03-26 NOTE — PROGRESS NOTE ADULT - SUBJECTIVE AND OBJECTIVE BOX
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 Last flare up for Guillain Holcombe was January 2017. Code stroke was called on arrival & diagnosed with ischemic stroke s/p TPA. Pt was admitted to Neuro critical care team.  Repeat CT head after TPA without evidence of ICH.     Pt denies any sob, no chest pain, no n/v or abd pain. still has lower ext weakness L>R. Pt very tired    PAST MEDICAL & SURGICAL HISTORY:  HIV (human immunodeficiency virus infection)  Asthma  Guillain-Holcombe  Guillain BarrÃ© syndrome  HIV (human immunodeficiency virus infection): from birth  No significant past surgical history  History of orthopedic surgery: left arm      Social History:  Tobacco - 1/2ppd for many yeats  ETOH - Denies  Illicit drug abuse - Denies    FAMILY HISTORY:  unknown since adopted at birth      Allergies  Ceclor (Unknown)      REVIEW OF SYSTEMS:  All other ROS are negative except noted in HPI    MEDICATIONS  (STANDING):  aspirin enteric coated 81 milliGRAM(s) Oral daily  atorvastatin 20 milliGRAM(s) Oral at bedtime  heparin  Injectable 5000 Unit(s) SubCutaneous every 8 hours  QUEtiapine 100 milliGRAM(s) Oral daily  QUEtiapine 200 milliGRAM(s) Oral at bedtime  tenofovir alafenamide 10 mG/nhtkpbathrum126 mG/cobicistat 150 mG/emtricitabine 200 mG (GENVOYA) 1 Tablet(s) Oral daily    MEDICATIONS  (PRN):      Vital Signs Last 24 Hrs  T(C): 37.2 (26 Mar 2020 11:27), Max: 37.2 (26 Mar 2020 11:27)  T(F): 99 (26 Mar 2020 11:27), Max: 99 (26 Mar 2020 11:27)  HR: 110 (26 Mar 2020 10:00) (90 - 115)  BP: 115/58 (26 Mar 2020 10:00) (101/52 - 137/71)  BP(mean): 80 (26 Mar 2020 10:00) (73 - 96)  RR: 23 (26 Mar 2020 10:00) (14 - 26)  SpO2: 96% (26 Mar 2020 10:00) (96% - 100%)    PHYSICAL EXAM:    GENERAL: NAD, well-groomed, well-developed  HEAD:  Atraumatic, Normocephalic  EYES: EOMI, PERRLA, conjunctiva and sclera clear  NERVOUS SYSTEM:  Alert & Oriented X3, ; Motor Strength 5/5 Right and Left upper and right lower extremities; Left Lower motor ext 4/5  CHEST/LUNG: CTA  b/l,  no rales, rhonchi, wheezing  HEART: Regular rate and rhythm; No murmurs  ABDOMEN: Soft, Nontender, Nondistended; Bowel sounds present  EXTREMITIES:  2+ Peripheral Pulses, No clubbing, cyanosis, or edema     LABS:                        10.8   9.87  )-----------( 179      ( 25 Mar 2020 06:01 )             33.8     03-25    130<L>  |  92<L>  |  22.0<H>  ----------------------------<  102<H>  4.0   |  27.0  |  1.11    Ca    8.3<L>      25 Mar 2020 06:01    TPro  6.7  /  Alb  3.6  /  TBili  0.5  /  DBili  x   /  AST  34  /  ALT  21  /  AlkPhos  50  03-25    PT/INR - ( 25 Mar 2020 06:01 )   PT: 14.5 sec;   INR: 1.27 ratio         PTT - ( 25 Mar 2020 06:01 )  PTT:31.9 sec

## 2020-03-27 ENCOUNTER — TRANSCRIPTION ENCOUNTER (OUTPATIENT)
Age: 30
End: 2020-03-27

## 2020-03-27 LAB
ANION GAP SERPL CALC-SCNC: 13 MMOL/L — SIGNIFICANT CHANGE UP (ref 5–17)
BUN SERPL-MCNC: 12 MG/DL — SIGNIFICANT CHANGE UP (ref 8–20)
CALCIUM SERPL-MCNC: 9.2 MG/DL — SIGNIFICANT CHANGE UP (ref 8.6–10.2)
CHLORIDE SERPL-SCNC: 100 MMOL/L — SIGNIFICANT CHANGE UP (ref 98–107)
CO2 SERPL-SCNC: 26 MMOL/L — SIGNIFICANT CHANGE UP (ref 22–29)
CREAT SERPL-MCNC: 0.82 MG/DL — SIGNIFICANT CHANGE UP (ref 0.5–1.3)
GLUCOSE SERPL-MCNC: 90 MG/DL — SIGNIFICANT CHANGE UP (ref 70–99)
HCT VFR BLD CALC: 35.1 % — LOW (ref 39–50)
HGB BLD-MCNC: 11.4 G/DL — LOW (ref 13–17)
MAGNESIUM SERPL-MCNC: 1.4 MG/DL — LOW (ref 1.6–2.6)
MCHC RBC-ENTMCNC: 29.4 PG — SIGNIFICANT CHANGE UP (ref 27–34)
MCHC RBC-ENTMCNC: 32.5 GM/DL — SIGNIFICANT CHANGE UP (ref 32–36)
MCV RBC AUTO: 90.5 FL — SIGNIFICANT CHANGE UP (ref 80–100)
PLATELET # BLD AUTO: 217 K/UL — SIGNIFICANT CHANGE UP (ref 150–400)
POTASSIUM SERPL-MCNC: 3.8 MMOL/L — SIGNIFICANT CHANGE UP (ref 3.5–5.3)
POTASSIUM SERPL-SCNC: 3.8 MMOL/L — SIGNIFICANT CHANGE UP (ref 3.5–5.3)
RBC # BLD: 3.88 M/UL — LOW (ref 4.2–5.8)
RBC # FLD: 11.7 % — SIGNIFICANT CHANGE UP (ref 10.3–14.5)
SODIUM SERPL-SCNC: 139 MMOL/L — SIGNIFICANT CHANGE UP (ref 135–145)
WBC # BLD: 3.7 K/UL — LOW (ref 3.8–10.5)
WBC # FLD AUTO: 3.7 K/UL — LOW (ref 3.8–10.5)

## 2020-03-27 PROCEDURE — 99232 SBSQ HOSP IP/OBS MODERATE 35: CPT

## 2020-03-27 PROCEDURE — 99222 1ST HOSP IP/OBS MODERATE 55: CPT

## 2020-03-27 RX ORDER — MAGNESIUM OXIDE 400 MG ORAL TABLET 241.3 MG
1 TABLET ORAL
Qty: 30 | Refills: 0
Start: 2020-03-27 | End: 2020-04-25

## 2020-03-27 RX ORDER — ASPIRIN/CALCIUM CARB/MAGNESIUM 324 MG
1 TABLET ORAL
Qty: 30 | Refills: 0
Start: 2020-03-27 | End: 2020-04-25

## 2020-03-27 RX ORDER — ATORVASTATIN CALCIUM 80 MG/1
1 TABLET, FILM COATED ORAL
Qty: 30 | Refills: 0
Start: 2020-03-27 | End: 2020-04-25

## 2020-03-27 RX ORDER — MAGNESIUM OXIDE 400 MG ORAL TABLET 241.3 MG
400 TABLET ORAL DAILY
Refills: 0 | Status: DISCONTINUED | OUTPATIENT
Start: 2020-03-27 | End: 2020-04-01

## 2020-03-27 RX ADMIN — MAGNESIUM OXIDE 400 MG ORAL TABLET 400 MILLIGRAM(S): 241.3 TABLET ORAL at 15:53

## 2020-03-27 RX ADMIN — QUETIAPINE FUMARATE 200 MILLIGRAM(S): 200 TABLET, FILM COATED ORAL at 20:46

## 2020-03-27 RX ADMIN — ATORVASTATIN CALCIUM 20 MILLIGRAM(S): 80 TABLET, FILM COATED ORAL at 20:46

## 2020-03-27 RX ADMIN — QUETIAPINE FUMARATE 100 MILLIGRAM(S): 200 TABLET, FILM COATED ORAL at 15:53

## 2020-03-27 RX ADMIN — ELVITEGRAVIR, COBICISTAT, EMTRICITABINE, AND TENOFOVIR ALAFENAMIDE 1 TABLET(S): 150; 150; 200; 10 TABLET ORAL at 15:53

## 2020-03-27 RX ADMIN — Medication 81 MILLIGRAM(S): at 15:53

## 2020-03-27 NOTE — PROGRESS NOTE ADULT - SUBJECTIVE AND OBJECTIVE BOX
HEALTH ISSUES - PROBLEM Dx:  30 male with hx of HIV, Guillain Mobile diagnosed in 2017 with residual b/l LE weakness Left >RIght, ambulates independently at baseline. He had an NIHSS=2 and was given alteplase for acute neurological symptoms with time of onset within 4.5 hours.  CTA did not show LVO and he was not a candidate for neuro-intervention. post TpA imaging neg so far      INTERVAL HPI/ OVERNIGHT EVENTS:    leg weakness resolved  back to baseline  but not motivated to get OOB    REVIEW OF SYSTEMS:    as above    Vital Signs Last 24 Hrs  T(C): 36.6 (27 Mar 2020 10:14), Max: 37 (26 Mar 2020 15:48)  T(F): 97.9 (27 Mar 2020 10:14), Max: 98.6 (26 Mar 2020 15:48)  HR: 82 (27 Mar 2020 10:14) (82 - 103)  BP: 145/84 (27 Mar 2020 10:14) (131/74 - 145/84)  BP(mean): --  RR: 17 (27 Mar 2020 10:14) (17 - 19)  SpO2: 94% (27 Mar 2020 10:14) (94% - 97%)    PHYSICAL EXAM-  GENERAL: NAD, well-groomed, well-developed  HEAD:  Atraumatic, Normocephalic  EYES: EOMI, PERRLA, conjunctiva and sclera clear  CHEST/LUNG: CTA  b/l,  no rales, rhonchi, wheezing  HEART: Regular rate and rhythm; No murmurs  ABDOMEN: Soft, Nontender, Nondistended; Bowel sounds present  EXTREMITIES:  2+ Peripheral Pulses, No clubbing, cyanosis, or edema   Neuro: AAOx3; EOMI, ROGERS, tongue midline, no facial asymmetry, Strength is 5/5 in the right arm and leg. Strength is 5/5 in the left arm and 4+/5 left leg      MEDICATIONS  (STANDING):  aspirin enteric coated 81 milliGRAM(s) Oral daily  atorvastatin 20 milliGRAM(s) Oral at bedtime  heparin  Injectable 5000 Unit(s) SubCutaneous every 8 hours  QUEtiapine 100 milliGRAM(s) Oral daily  QUEtiapine 200 milliGRAM(s) Oral at bedtime  tenofovir alafenamide 10 mG/fevopoqgddne811 mG/cobicistat 150 mG/emtricitabine 200 mG (GENVOYA) 1 Tablet(s) Oral daily    MEDICATIONS  (PRN):      LABS:                        11.4   3.70  )-----------( 217      ( 27 Mar 2020 06:14 )             35.1     03-27    139  |  100  |  12.0  ----------------------------<  90  3.8   |  26.0  |  0.82    Ca    9.2      27 Mar 2020 06:14  Mg     1.4     03-27

## 2020-03-27 NOTE — DISCHARGE NOTE PROVIDER - CARE PROVIDER_API CALL
PMD,   Phone: (   )    -  Fax: (   )    -  Follow Up Time: Karri Stover; PhD)  Neurology; Vascular Neurology  [address] [place] [address]  Phone: [phone]  Fax: [phone]  Follow Up Time:

## 2020-03-27 NOTE — DISCHARGE NOTE PROVIDER - NSDCMRMEDTOKEN_GEN_ALL_CORE_FT
Genvoya oral tablet: 1 tab(s) orally once a day  SEROquel 100 mg oral tablet: 100mg in the morning and 200mg at bedtime aspirin 81 mg oral delayed release tablet: 1 tab(s) orally once a day  atorvastatin 20 mg oral tablet: 1 tab(s) orally once a day (at bedtime)  Genvoya oral tablet: 1 tab(s) orally once a day  magnesium oxide 400 mg (241.3 mg elemental magnesium) oral tablet: 1 tab(s) orally once a day  SEROquel 100 mg oral tablet: 100mg in the morning and 200mg at bedtime albuterol 90 mcg/inh inhalation aerosol: 2 puff(s) inhaled every 6 hours, As needed, Bronchospasm  aspirin 81 mg oral delayed release tablet: 1 tab(s) orally once a day  atorvastatin 20 mg oral tablet: 1 tab(s) orally once a day (at bedtime)  Genvoya oral tablet: 1 tab(s) orally once a day  magnesium oxide 400 mg (241.3 mg elemental magnesium) oral tablet: 1 tab(s) orally once a day  SEROquel 100 mg oral tablet: 100mg in the morning and 200mg at bedtime albuterol 90 mcg/inh inhalation aerosol: 2 puff(s) inhaled every 6 hours, As needed, Bronchospasm  aspirin 81 mg oral delayed release tablet: 1 tab(s) orally once a day  atorvastatin 20 mg oral tablet: 1 tab(s) orally once a day (at bedtime)  Genvoya oral tablet: 1 tab(s) orally once a day  SEROquel 100 mg oral tablet: 100mg in the morning and 200mg at bedtime

## 2020-03-27 NOTE — PROGRESS NOTE ADULT - SUBJECTIVE AND OBJECTIVE BOX
[facility]                                     Neu[facility] at [place]                                 Jolanta Marte, & Rene                                  [address]                                        [place], [place], [address]                                             [phone]    CC: left leg weakness  HPI:   The patient is a 30y Male who presented with acute onset of left leg weakness with last know well at 0230 this morning.  he has history of GBS in past with mild residual b/l leg weakness, but noted worsening left leg weakness today and presented to the ED for evaluation.  He had an NIHSS=2 and was given alteplase for acute neurological symptoms with time of onset within 4.5 hours.  CTA did not show LVO and he was not a candidate for neuro-intervention.  Neurology is asked to follow.    Interval history: moving left leg better yet today, repeat head CT no blood, MRI brain no acute CVA    ROS neurology: Denies headache or dizziness. c/o left leg weakness, although he says it is improving.  no numbness.  Denies speech/language deficits. Denies diplopia/blurred vision.  Denies confusion    MEDICATIONS  (STANDING):  aspirin enteric coated 81 milliGRAM(s) Oral daily  atorvastatin 20 milliGRAM(s) Oral at bedtime  heparin  Injectable 5000 Unit(s) SubCutaneous every 8 hours  QUEtiapine 100 milliGRAM(s) Oral daily  QUEtiapine 200 milliGRAM(s) Oral at bedtime  tenofovir alafenamide 10 mG/klmnllcynqzn774 mG/cobicistat 150 mG/emtricitabine 200 mG (GENVOYA) 1 Tablet(s) Oral daily    MEDICATIONS  (PRN):      Vital Signs Last 24 Hrs  T(C): 36.6 (27 Mar 2020 10:14), Max: 37 (26 Mar 2020 15:48)  T(F): 97.9 (27 Mar 2020 10:14), Max: 98.6 (26 Mar 2020 15:48)  HR: 82 (27 Mar 2020 10:14) (82 - 103)  BP: 145/84 (27 Mar 2020 10:14) (131/74 - 145/84)  BP(mean): --  RR: 17 (27 Mar 2020 10:14) (17 - 19)  SpO2: 94% (27 Mar 2020 10:14) (94% - 97%)    Detailed Neurologic Exam:    Mental status: The patient is awake and alert and has normal attention span.  The patient is fully oriented in 3 spheres. The patient is oriented to current events. The patient is able to name objects, follow commands, repeat sentences.    Cranial nerves: Pupils equal and react symmetrically to light. There is no visual field deficit to confrontation. Extraocular motion is full with no nystagmus. There is no ptosis. Facial sensation is intact. Facial musculature is symmetric. Palate elevates symmetrically. Shoulder shrug is normal. Tongue is midline.    Motor: There is normal bulk and tone.  There is no tremor.  Strength is 5/5 in the right arm and leg.   Strength is 5/5 in the left arm and 4+/5 left leg    Sensation: Intact to light touch and pin in 4 extremities    Reflexes: 2+ throughout and plantar responses are flexor.    Cerebellar: There is no dysmetria on finger to nose testing.    Gait : deferred    LABS:                                    11.4   3.70  )-----------( 217      ( 27 Mar 2020 06:14 )             35.1     03-27    139  |  100  |  12.0  ----------------------------<  90  3.8   |  26.0  |  0.82    Ca    9.2      27 Mar 2020 06:14  Mg     1.4     03-27      Lipid Profile (03.25.20 @ 20:44)    Total Cholesterol/HDL Ratio Measurement: 4.0 Ratio    Cholesterol, Serum: 134 mg/dL    Triglycerides, Serum: 136 mg/dL    HDL Cholesterol, Serum: 33: HDL Levels >/= 60 mg/dL are considered beneficial and a "negative" risk  factor.  Effective 08/15/2018: New reference range and interpretive comment. mg/dL    Direct LDL: 74:    RADIOLOGY & ADDITIONAL STUDIES (independently reviewed unless otherwise noted):  Recent neurological studies (images reviewed independently):  head CT 3/26- no acute blood, stroke or mass  MRI brain 3/25- no acute CVA on DWI, no blood and no mass    Previous neurological studies:  head CT no acute stroke, mass or blood  CTA head: no aneurysm, AVM, LVO or sig stenosis in COW  CTA neck: no sig carotid or vertebral stenosis  CT Perfusion head - CBF<30% volume 0ml, Tmax>6s volume =0ml

## 2020-03-27 NOTE — CONSULT NOTE ADULT - SUBJECTIVE AND OBJECTIVE BOX
30 y.o. 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 Presents to ED c/o b/l leg weakness L>R. Patient states cannot feel legs; states tingling from toes to back of legs extending upward to back. States suddenly began while walking, unable to ambulate secondary to weakness. States feels similar to Guillain [place] flare but at that time included b/l LE weakness, last flare January 2017. Unknown CD4 count, viral load undetectable. Patient No further complaints at this time.     25-Mar-2020 05:29	  25-Mar-2020 05:40	  25-Mar-2020 05:44	  No acute findings tpa administered 06:02	    NIHSS=2 (might be baseline) (25 Mar 2020 08:01)      Imaging showed (reviewed):  HEAD CT - No acute findings  CAP CT - No acute findings  C SPINE CT - No acute findings    REVIEW OF SYSTEMS  Constitutional - No fever, No weight loss, No fatigue  HEENT - No eye pain, No visual disturbances, No difficulty hearing, No tinnitus, No vertigo, No neck pain  Respiratory - No cough, No wheezing, No shortness of breath  Cardiovascular - No chest pain, No palpitations  Gastrointestinal - No abdominal pain, No nausea, No vomiting, No diarrhea, No constipation  Genitourinary - No dysuria, No frequency, No hematuria, No incontinence  Neurological - No headaches, No memory loss, No loss of strength, No numbness, No tremors  Skin - No itching, No rashes, No lesions   Endocrine - No temperature intolerance  Musculoskeletal - No joint pain, No joint swelling, No muscle pain  Psychiatric - No depression, No anxiety    VITALS  T(C): 36.8 (03-27-20 @ 05:17), Max: 37.2 (03-26-20 @ 11:27)  HR: 93 (03-27-20 @ 05:17) (93 - 110)  BP: 138/64 (03-27-20 @ 05:17) (115/53 - 144/62)  RR: 17 (03-27-20 @ 05:17) (17 - 23)  SpO2: 97% (03-27-20 @ 05:17) (96% - 97%)  Wt(kg): --    PAST MEDICAL & SURGICAL HISTORY  HIV (human immunodeficiency virus infection)  Asthma  Guillain-[place]  No significant past surgical history  History of orthopedic surgery      SOCIAL HISTORY  Smoking - Denied  EtOH - Denied   Drugs - Denied    FUNCTIONAL HISTORY  Lives   Independent    CURRENT FUNCTIONAL STATUS      FAMILY HISTORY   No pertinent family history in first degree relatives    ----------------------------------------------------------------------------------------------------  RECENT LABS/IMAGING  CBC Full  -  ( 27 Mar 2020 06:14 )  WBC Count : 3.70 K/uL  RBC Count : 3.88 M/uL  Hemoglobin : 11.4 g/dL  Hematocrit : 35.1 %  Platelet Count - Automated : 217 K/uL  Mean Cell Volume : 90.5 fl  Mean Cell Hemoglobin : 29.4 pg  Mean Cell Hemoglobin Concentration : 32.5 gm/dL  Auto Neutrophil # : x  Auto Lymphocyte # : x  Auto Monocyte # : x  Auto Eosinophil # : x  Auto Basophil # : x  Auto Neutrophil % : x  Auto Lymphocyte % : x  Auto Monocyte % : x  Auto Eosinophil % : x  Auto Basophil % : x    03-27    139  |  100  |  12.0  ----------------------------<  90  3.8   |  26.0  |  0.82    Ca    9.2      27 Mar 2020 06:14  Mg     1.4     03-27    ---------------------------------------------------------------------------------------  ALLERGIES  Ceclor (Unknown)    ---------------------------------------------------------------------------------------  MEDICATIONS   aspirin enteric coated 81 milliGRAM(s) Oral daily  atorvastatin 20 milliGRAM(s) Oral at bedtime  heparin  Injectable 5000 Unit(s) SubCutaneous every 8 hours  QUEtiapine 100 milliGRAM(s) Oral daily  QUEtiapine 200 milliGRAM(s) Oral at bedtime  tenofovir alafenamide 10 mG/tadhwulquveq007 mG/cobicistat 150 mG/emtricitabine 200 mG (GENVOYA) 1 Tablet(s) Oral daily      ----------------------------------------------------------------------------------------  PHYSICAL EXAM  Constitutional - NAD, Comfortable  HEENT - NCAT, EOMI  Neck - Supple, No limited ROM  Chest - Breathing comfortably, No wheezing  Cardiovascular - S1S2   Abdomen - Soft   Extremities - No C/C/E, No calf tenderness   Neurologic Exam -                    Cognitive - Awake, Alert, AAO to self, place, date, year, situation     Communication - Fluent, No dysarthria     Cranial Nerves - CN 2-12 intact     Motor - No focal deficits                    LEFT    UE - ShAB 5/5, EF 5/5, EE 5/5, WE 5/5,  5/5                    RIGHT UE - ShAB 5/5, EF 5/5, EE 5/5, WE 5/5,  5/5                    LEFT    LE - HF 5/5, KE 5/5, DF 5/5, PF 5/5                    RIGHT LE - HF 5/5, KE 5/5, DF 5/5, PF 5/5        Sensory - Intact to LT     Reflexes - DTR Intact, No primitive reflexive     Coordination - FTN intact     OculoVestibular - No saccades, No nystagmus, VOR         Balance - WNL Static  Psychiatric - Mood stable, Affect WNL  ----------------------------------------------------------------------------------------  ASSESSMENT/PLAN  30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    Recommend ACUTE inpatient rehabilitation for the functional deficits consisting of 3 hours of therapy/day & 24 hour RN/daily PMR physician for comorbid medical management. Patient will be able to tolerate 3 hours a day.   Recommend [facility], patient DOES NOT meet acute inpatient rehabilitation criteria   Expect patient to achieve functional goals for DC HOME with OUTPATIENT   Expect patient to achieve functional goals for DC HOME with HOME CARE   Follow up with CONCUSSION PROGRAM - Call [phone] for an appointment  Will sign off, please reconsult if needed for rehab dispo recommendations. 30 y.o. 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 Presents to ED c/o b/l leg weakness L>R. Patient states cannot feel legs; states tingling from toes to back of legs extending upward to back. States suddenly began while walking, unable to ambulate secondary to weakness. States feels similar to Guillain [place] flare but at that time included b/l LE weakness, last flare January 2017. Unknown CD4 count, viral load undetectable. Patient No further complaints at this time.     25-Mar-2020 05:29	  25-Mar-2020 05:40	  25-Mar-2020 05:44	  No acute findings tpa administered 06:02	    NIHSS=2 (might be baseline) (25 Mar 2020 08:01)      Imaging showed (reviewed):  HEAD CT - No CT evidence of acute intracranial hemorrhage, mass effect or acute territorial infarct.   MRI BRAIN -     CAP CT - No acute findings  C SPINE CT - No acute findings    REVIEW OF SYSTEMS  Constitutional - No fever, No weight loss, No fatigue  HEENT - No eye pain, No visual disturbances, No difficulty hearing, No tinnitus, No vertigo, No neck pain  Respiratory - No cough, No wheezing, No shortness of breath  Cardiovascular - No chest pain, No palpitations  Gastrointestinal - No abdominal pain, No nausea, No vomiting, No diarrhea, No constipation  Genitourinary - No dysuria, No frequency, No hematuria, No incontinence  Neurological - No headaches, No memory loss, No loss of strength, No numbness, No tremors  Skin - No itching, No rashes, No lesions   Endocrine - No temperature intolerance  Musculoskeletal - No joint pain, No joint swelling, No muscle pain  Psychiatric - No depression, No anxiety    VITALS  T(C): 36.8 (03-27-20 @ 05:17), Max: 37.2 (03-26-20 @ 11:27)  HR: 93 (03-27-20 @ 05:17) (93 - 110)  BP: 138/64 (03-27-20 @ 05:17) (115/53 - 144/62)  RR: 17 (03-27-20 @ 05:17) (17 - 23)  SpO2: 97% (03-27-20 @ 05:17) (96% - 97%)  Wt(kg): --    PAST MEDICAL & SURGICAL HISTORY  HIV (human immunodeficiency virus infection)  Asthma  Guillain-[place]  No significant past surgical history  History of orthopedic surgery      SOCIAL HISTORY  Smoking - Denied  EtOH - Denied   Drugs - Denied    FUNCTIONAL HISTORY  Lives   Independent    CURRENT FUNCTIONAL STATUS      FAMILY HISTORY   No pertinent family history in first degree relatives    ----------------------------------------------------------------------------------------------------  RECENT LABS/IMAGING  CBC Full  -  ( 27 Mar 2020 06:14 )  WBC Count : 3.70 K/uL  RBC Count : 3.88 M/uL  Hemoglobin : 11.4 g/dL  Hematocrit : 35.1 %  Platelet Count - Automated : 217 K/uL  Mean Cell Volume : 90.5 fl  Mean Cell Hemoglobin : 29.4 pg  Mean Cell Hemoglobin Concentration : 32.5 gm/dL  Auto Neutrophil # : x  Auto Lymphocyte # : x  Auto Monocyte # : x  Auto Eosinophil # : x  Auto Basophil # : x  Auto Neutrophil % : x  Auto Lymphocyte % : x  Auto Monocyte % : x  Auto Eosinophil % : x  Auto Basophil % : x    03-27    139  |  100  |  12.0  ----------------------------<  90  3.8   |  26.0  |  0.82    Ca    9.2      27 Mar 2020 06:14  Mg     1.4     03-27    ---------------------------------------------------------------------------------------  ALLERGIES  Ceclor (Unknown)    ---------------------------------------------------------------------------------------  MEDICATIONS   aspirin enteric coated 81 milliGRAM(s) Oral daily  atorvastatin 20 milliGRAM(s) Oral at bedtime  heparin  Injectable 5000 Unit(s) SubCutaneous every 8 hours  QUEtiapine 100 milliGRAM(s) Oral daily  QUEtiapine 200 milliGRAM(s) Oral at bedtime  tenofovir alafenamide 10 mG/mtpoijuyaqyi139 mG/cobicistat 150 mG/emtricitabine 200 mG (GENVOYA) 1 Tablet(s) Oral daily      ----------------------------------------------------------------------------------------  PHYSICAL EXAM  Constitutional - NAD, Comfortable  HEENT - NCAT, EOMI  Neck - Supple, No limited ROM  Chest - Breathing comfortably, No wheezing  Cardiovascular - S1S2   Abdomen - Soft   Extremities - No C/C/E, No calf tenderness   Neurologic Exam -                    Cognitive - Awake, Alert, AAO to self, place, date, year, situation     Communication - Fluent, No dysarthria     Cranial Nerves - CN 2-12 intact     Motor - No focal deficits                    LEFT    UE - ShAB 5/5, EF 5/5, EE 5/5, WE 5/5,  5/5                    RIGHT UE - ShAB 5/5, EF 5/5, EE 5/5, WE 5/5,  5/5                    LEFT    LE - HF 5/5, KE 5/5, DF 5/5, PF 5/5                    RIGHT LE - HF 5/5, KE 5/5, DF 5/5, PF 5/5        Sensory - Intact to LT     Reflexes - DTR Intact, No primitive reflexive     Coordination - FTN intact     OculoVestibular - No saccades, No nystagmus, VOR         Balance - WNL Static  Psychiatric - Mood stable, Affect WNL  ----------------------------------------------------------------------------------------  ASSESSMENT/PLAN  30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    Recommend ACUTE inpatient rehabilitation for the functional deficits consisting of 3 hours of therapy/day & 24 hour RN/daily PMR physician for comorbid medical management. Patient will be able to tolerate 3 hours a day.   Recommend [facility], patient DOES NOT meet acute inpatient rehabilitation criteria   Expect patient to achieve functional goals for DC HOME with OUTPATIENT   Expect patient to achieve functional goals for DC HOME with HOME CARE   Follow up with CONCUSSION PROGRAM - Call [phone] for an appointment  Will sign off, please reconsult if needed for rehab dispo recommendations. 30 y.o. 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 Presents to ED c/o b/l leg weakness L>R. Patient states cannot feel legs; states tingling from toes to back of legs extending upward to back. States suddenly began while walking, unable to ambulate secondary to weakness. States feels similar to Guillain [place] flare but at that time included b/l LE weakness, last flare January 2017. Unknown CD4 count, viral load undetectable. Patient No further complaints at this time.     25-Mar-2020 05:29	  25-Mar-2020 05:40	  25-Mar-2020 05:44	  No acute findings tpa administered 06:02	    NIHSS=2 (might be baseline) (25 Mar 2020 08:01)      Imaging showed (reviewed):  HEAD CT - No CT evidence of acute intracranial hemorrhage, mass effect or acute territorial infarct.   MRI BRAIN - No diffusion restriction or MR evidence of acute ischemia. Periventricular white matter findings raise the possibility of an underlying inflammatory etiology and/or demyelinating disease.   CAP CT - No acute findings  C SPINE CT - No acute findings    REVIEW OF SYSTEMS  Constitutional - No fever, No weight loss, No fatigue  HEENT - No eye pain, No visual disturbances, No difficulty hearing, No tinnitus, No vertigo, No neck pain  Respiratory - No cough, No wheezing, No shortness of breath  Cardiovascular - No chest pain, No palpitations  Gastrointestinal - No abdominal pain, No nausea, No vomiting, No diarrhea, No constipation  Genitourinary - No dysuria, No frequency, No hematuria, No incontinence  Neurological - No headaches, No memory loss, No loss of strength, No numbness, No tremors  Skin - No itching, No rashes, No lesions   Endocrine - No temperature intolerance  Musculoskeletal - No joint pain, No joint swelling, No muscle pain  Psychiatric - No depression, No anxiety    VITALS  T(C): 36.8 (03-27-20 @ 05:17), Max: 37.2 (03-26-20 @ 11:27)  HR: 93 (03-27-20 @ 05:17) (93 - 110)  BP: 138/64 (03-27-20 @ 05:17) (115/53 - 144/62)  RR: 17 (03-27-20 @ 05:17) (17 - 23)  SpO2: 97% (03-27-20 @ 05:17) (96% - 97%)  Wt(kg): --    PAST MEDICAL & SURGICAL HISTORY  HIV (human immunodeficiency virus infection)  Asthma  Guillain-[place]  No significant past surgical history  History of orthopedic surgery      SOCIAL HISTORY  Smoking - Denied  EtOH - Denied   Drugs - Denied    FUNCTIONAL HISTORY  Lives   Independent    CURRENT FUNCTIONAL STATUS      FAMILY HISTORY   No pertinent family history in first degree relatives    ----------------------------------------------------------------------------------------------------  RECENT LABS/IMAGING  CBC Full  -  ( 27 Mar 2020 06:14 )  WBC Count : 3.70 K/uL  RBC Count : 3.88 M/uL  Hemoglobin : 11.4 g/dL  Hematocrit : 35.1 %  Platelet Count - Automated : 217 K/uL  Mean Cell Volume : 90.5 fl  Mean Cell Hemoglobin : 29.4 pg  Mean Cell Hemoglobin Concentration : 32.5 gm/dL  Auto Neutrophil # : x  Auto Lymphocyte # : x  Auto Monocyte # : x  Auto Eosinophil # : x  Auto Basophil # : x  Auto Neutrophil % : x  Auto Lymphocyte % : x  Auto Monocyte % : x  Auto Eosinophil % : x  Auto Basophil % : x    03-27    139  |  100  |  12.0  ----------------------------<  90  3.8   |  26.0  |  0.82    Ca    9.2      27 Mar 2020 06:14  Mg     1.4     03-27    ---------------------------------------------------------------------------------------  ALLERGIES  Ceclor (Unknown)    ---------------------------------------------------------------------------------------  MEDICATIONS   aspirin enteric coated 81 milliGRAM(s) Oral daily  atorvastatin 20 milliGRAM(s) Oral at bedtime  heparin  Injectable 5000 Unit(s) SubCutaneous every 8 hours  QUEtiapine 100 milliGRAM(s) Oral daily  QUEtiapine 200 milliGRAM(s) Oral at bedtime  tenofovir alafenamide 10 mG/bretnjjloiyu761 mG/cobicistat 150 mG/emtricitabine 200 mG (GENVOYA) 1 Tablet(s) Oral daily      ----------------------------------------------------------------------------------------  PHYSICAL EXAM  Constitutional - NAD, Comfortable  HEENT - NCAT, EOMI  Neck - Supple, No limited ROM  Chest - Breathing comfortably, No wheezing  Cardiovascular - S1S2   Abdomen - Soft   Extremities - No C/C/E, No calf tenderness   Neurologic Exam -                    Cognitive - Awake, Alert, AAO to self, place, date, year, situation     Communication - Fluent, No dysarthria     Cranial Nerves - CN 2-12 intact     Motor - No focal deficits                    LEFT    UE - ShAB 5/5, EF 5/5, EE 5/5, WE 5/5,  5/5                    RIGHT UE - ShAB 5/5, EF 5/5, EE 5/5, WE 5/5,  5/5                    LEFT    LE - HF 5/5, KE 5/5, DF 5/5, PF 5/5                    RIGHT LE - HF 5/5, KE 5/5, DF 5/5, PF 5/5        Sensory - Intact to LT     Reflexes - DTR Intact, No primitive reflexive     Coordination - FTN intact     OculoVestibular - No saccades, No nystagmus, VOR         Balance - WNL Static  Psychiatric - Mood stable, Affect WNL  ----------------------------------------------------------------------------------------  ASSESSMENT/PLAN  30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    Recommend ACUTE inpatient rehabilitation for the functional deficits consisting of 3 hours of therapy/day & 24 hour RN/daily PMR physician for comorbid medical management. Patient will be able to tolerate 3 hours a day.   Recommend [facility], patient DOES NOT meet acute inpatient rehabilitation criteria   Expect patient to achieve functional goals for DC HOME with OUTPATIENT   Expect patient to achieve functional goals for DC HOME with HOME CARE   Follow up with CONCUSSION PROGRAM - Call [phone] for an appointment  Will sign off, please reconsult if needed for rehab dispo recommendations. 30 y.o. 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 Presents to ED c/o b/l leg weakness L>R. Patient states cannot feel legs; states tingling from toes to back of legs extending upward to back. States suddenly began while walking, unable to ambulate secondary to weakness. States feels similar to Guillain [place] flare but at that time included b/l LE weakness, last flare January 2017. Unknown CD4 count, viral load undetectable. Patient No further complaints at this time.   	    NIHSS=2 (might be baseline) (25 Mar 2020 08:01)      Imaging showed (reviewed):  HEAD CT - No CT evidence of acute intracranial hemorrhage, mass effect or acute territorial infarct.   MRI BRAIN - No diffusion restriction or MR evidence of acute ischemia. Periventricular white matter findings raise the possibility of an underlying inflammatory etiology and/or demyelinating disease.   CAP CT - No acute findings  C SPINE CT - No acute findings    REVIEW OF SYSTEMS  Constitutional - No fever, No weight loss, No fatigue  HEENT - No eye pain, No visual disturbances, No difficulty hearing, No tinnitus, No vertigo, No neck pain  Respiratory - No cough, No wheezing, No shortness of breath  Cardiovascular - No chest pain, No palpitations  Gastrointestinal - No abdominal pain, No nausea, No vomiting, No diarrhea, No constipation  Genitourinary - No dysuria, No frequency, No hematuria, No incontinence  Neurological - No headaches, No memory loss, L > R LEG WEAKNESS No numbness, No tremors  Skin - No itching, No rashes, No lesions   Endocrine - No temperature intolerance  Musculoskeletal - LEFT LEG PAIN  Psychiatric - No depression, No anxiety    VITALS  T(C): 36.8 (03-27-20 @ 05:17), Max: 37.2 (03-26-20 @ 11:27)  HR: 93 (03-27-20 @ 05:17) (93 - 110)  BP: 138/64 (03-27-20 @ 05:17) (115/53 - 144/62)  RR: 17 (03-27-20 @ 05:17) (17 - 23)  SpO2: 97% (03-27-20 @ 05:17) (96% - 97%)  Wt(kg): --    PAST MEDICAL & SURGICAL HISTORY  HIV (human immunodeficiency virus infection)  Asthma  Guillain-[place]  No significant past surgical history  History of orthopedic surgery      SOCIAL HISTORY  Smoking - Denied  EtOH - Denied   Drugs - Denied    FUNCTIONAL HISTORY  Lives ALONE  Independent PRIOR TO ADMISSION    CURRENT FUNCTIONAL STATUS: mod A S-S transfer and Amb w/ RW.      FAMILY HISTORY   No pertinent family history in first degree relatives    ----------------------------------------------------------------------------------------------------  RECENT LABS/IMAGING  CBC Full  -  ( 27 Mar 2020 06:14 )  WBC Count : 3.70 K/uL  RBC Count : 3.88 M/uL  Hemoglobin : 11.4 g/dL  Hematocrit : 35.1 %  Platelet Count - Automated : 217 K/uL  Mean Cell Volume : 90.5 fl  Mean Cell Hemoglobin : 29.4 pg  Mean Cell Hemoglobin Concentration : 32.5 gm/dL  Auto Neutrophil # : x  Auto Lymphocyte # : x  Auto Monocyte # : x  Auto Eosinophil # : x  Auto Basophil # : x  Auto Neutrophil % : x  Auto Lymphocyte % : x  Auto Monocyte % : x  Auto Eosinophil % : x  Auto Basophil % : x    03-27    139  |  100  |  12.0  ----------------------------<  90  3.8   |  26.0  |  0.82    Ca    9.2      27 Mar 2020 06:14  Mg     1.4     03-27    ---------------------------------------------------------------------------------------  ALLERGIES  Ceclor (Unknown)    ---------------------------------------------------------------------------------------  MEDICATIONS   aspirin enteric coated 81 milliGRAM(s) Oral daily  atorvastatin 20 milliGRAM(s) Oral at bedtime  heparin  Injectable 5000 Unit(s) SubCutaneous every 8 hours  QUEtiapine 100 milliGRAM(s) Oral daily  QUEtiapine 200 milliGRAM(s) Oral at bedtime  tenofovir alafenamide 10 mG/cpgiabytboun280 mG/cobicistat 150 mG/emtricitabine 200 mG (GENVOYA) 1 Tablet(s) Oral daily      ----------------------------------------------------------------------------------------  PHYSICAL EXAM  Constitutional - NAD, Comfortable  HEENT - NCAT, EOMI  Neck - Supple, No limited ROM  Chest - Breathing comfortably, No wheezing  Cardiovascular - S1S2   Abdomen - Soft   Extremities - No C/C/E, No calf tenderness   Neurologic Exam -                    Cognitive - Awake, Alert, AAO to self, place, date, year, situation     Communication - Fluent, No dysarthria     Cranial Nerves - CN 2-12 intact     Motor - No focal deficits                    LEFT    UE - ShAB 5/5, EF 5/5, EE 5/5, WE 5/5,  5/5                    RIGHT UE - ShAB 5/5, EF 5/5, EE 5/5, WE 5/5,  5/5                    LEFT    LE - HF 4/5, KE 4/5, DF 4/5, PF 5/5                    RIGHT LE - HF 5/5, KE 5/5, DF 5/5, PF 5/5        Sensory - SILT     Reflexes - DTR Intact, No primitive reflexive     Coordination - FTN intact     OculoVestibular - DEFERRED     Balance - DEFERRED  Psychiatric - Mood stable, Affect WNL  ----------------------------------------------------------------------------------------  ASSESSMENT/PLAN  30 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 & O.T. to prevent further deconditioning.  Disposition - Recommend ACUTE inpatient rehabilitation for the functional deficits consisting of 3 hours of therapy/day & 24 hour RN/daily PMR physician for comorbid medical management. Patient will be able to tolerate 3 hours a day.

## 2020-03-27 NOTE — DISCHARGE NOTE PROVIDER - HOSPITAL COURSE
admitted with worsening left LE weakness > Right LE weakness.    baseline weakness but able to ambulate    s/p TpA.     Medically stable and agreeable with discharge and follow up plan. Patient was advised to return to ED if any symptoms occur or worsen.    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 Patient seen in ER 3/24--per triage note unresponsive s/p narcan, patient states used marijuana.    Received TPA for suspected CVA. MRI w/o acute cva., course complicated by acute unresponsive episode s/p intubation/extubation. Utox positive for cocaine/marijuana. Patient evaluated by PT and recommended acute rehab.  Patient evaluated by PM&R and recommended for [facility]. 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 Patient seen in ER 3/24--per triage note unresponsive s/p narcan, patient states used marijuana.    Received TPA for suspected CVA. MRI w/o acute cva., course complicated by acute unresponsive episode s/p intubation/extubation. Utox positive for cocaine/marijuana. Patient evaluated by PT and recommended acute rehab.  Patient evaluated by PM&R and recommended for [facility].     continued physical and occupational therapy with improvement.    stable for discharge.  social work working on shelter vs [facility]        dc planning 40 minutes.

## 2020-03-27 NOTE — PROGRESS NOTE ADULT - ASSESSMENT
The patient is a 30y Male who is followed by neurology because of left leg weakness    Left leg weakness  possible CVA  received altelplase at 0602 3/25/2020    He has been downgraded to the floor  on ASA 81  repeat head CT  24 hours after tPA administration did not show blood  MRI brain did not show stroke  Continue PT  can keep BP normal  lipid panel, LDL= 74  goal LDL <100 given no stroke on MRI brain  DVT ppx    He has history of Guillain [place] Syndrome, no evidence on exam currently for recurrence    HIV  continue home anti-virals     Tobacco use   smokes about 1/2 PPD  smoking cessation was recommended on initial consult    Disposition  may need acute rehab on discharge if left leg weakness persists    will follow with you    Karri Stover MD PhD   [phone]

## 2020-03-27 NOTE — DISCHARGE NOTE PROVIDER - NSDCCPCAREPLAN_GEN_ALL_CORE_FT
PRINCIPAL DISCHARGE DIAGNOSIS  Diagnosis: CVA (cerebral vascular accident)  Assessment and Plan of Treatment:       SECONDARY DISCHARGE DIAGNOSES  Diagnosis: HIV disease  Assessment and Plan of Treatment:     Diagnosis: Guillain-[place]  Assessment and Plan of Treatment: PRINCIPAL DISCHARGE DIAGNOSIS  Diagnosis: CVA (cerebral vascular accident)  Assessment and Plan of Treatment:       SECONDARY DISCHARGE DIAGNOSES  Diagnosis: HIV disease  Assessment and Plan of Treatment:     Diagnosis: Guillain-[place]  Assessment and Plan of Treatment: PRINCIPAL DISCHARGE DIAGNOSIS  Diagnosis: Left leg weakness  Assessment and Plan of Treatment: stroke ruled out but received clot buster.  suspect worsened due to drug use         SECONDARY DISCHARGE DIAGNOSES  Diagnosis: Polysubstance abuse  Assessment and Plan of Treatment:     Diagnosis: HIV disease  Assessment and Plan of Treatment:     Diagnosis: Guillain-[place]  Assessment and Plan of Treatment:

## 2020-03-27 NOTE — PROGRESS NOTE ADULT - ASSESSMENT
30 male with hx of HIV, Guillain [place] diagnosed in 2017 with residual b/l LE weakness Left >RIght, ambulates independently at baseline, came to ED with cc of unble to ambulate at all due to worsen b/l leg weakness L>R and unable to feel his legs which suddenly began while walking. Last flare up for Guillain [place] was January 2017. Code stroke was called on arrival & diagnosed with ischemic stroke s/p TPA. Pt was admitted to Neuro critical care team.  Repeat CT head after TPA without evidence of ICH.     1) Acute CVA s/p tPA  - continue aspirin and statin  - neuro appreciated   - PT/OT/ PMR eval noted-    2) HIV  - on Genvoya    3) Depression  - on Seroquel    4) Guillain [place] Syndrome  - outpt follow up  - no evidence of flare up now    5) Prophylactic measure  - DVT ppx: on heparin SQ    PMR decision awaited

## 2020-03-28 DIAGNOSIS — I63.9 CEREBRAL INFARCTION, UNSPECIFIED: ICD-10-CM

## 2020-03-28 DIAGNOSIS — G61.0 GUILLAIN-BARRE SYNDROME: ICD-10-CM

## 2020-03-28 DIAGNOSIS — Z21 ASYMPTOMATIC HUMAN IMMUNODEFICIENCY VIRUS [HIV] INFECTION STATUS: ICD-10-CM

## 2020-03-28 DIAGNOSIS — J45.20 MILD INTERMITTENT ASTHMA, UNCOMPLICATED: ICD-10-CM

## 2020-03-28 PROCEDURE — 99223 1ST HOSP IP/OBS HIGH 75: CPT

## 2020-03-28 PROCEDURE — 99233 SBSQ HOSP IP/OBS HIGH 50: CPT

## 2020-03-28 RX ADMIN — QUETIAPINE FUMARATE 100 MILLIGRAM(S): 200 TABLET, FILM COATED ORAL at 12:38

## 2020-03-28 RX ADMIN — ATORVASTATIN CALCIUM 20 MILLIGRAM(S): 80 TABLET, FILM COATED ORAL at 22:28

## 2020-03-28 RX ADMIN — Medication 81 MILLIGRAM(S): at 12:38

## 2020-03-28 RX ADMIN — MAGNESIUM OXIDE 400 MG ORAL TABLET 400 MILLIGRAM(S): 241.3 TABLET ORAL at 12:38

## 2020-03-28 RX ADMIN — ELVITEGRAVIR, COBICISTAT, EMTRICITABINE, AND TENOFOVIR ALAFENAMIDE 1 TABLET(S): 150; 150; 200; 10 TABLET ORAL at 15:33

## 2020-03-28 RX ADMIN — QUETIAPINE FUMARATE 200 MILLIGRAM(S): 200 TABLET, FILM COATED ORAL at 22:28

## 2020-03-28 NOTE — PROGRESS NOTE ADULT - SUBJECTIVE AND OBJECTIVE BOX
[facility]                                     Neurology at [place]                                 Jolanta Marte, & Rene                                  [address]                                        [place], [place], [address]                                             [phone]    CC: left leg weakness  HPI:   The patient is a 30y Male who presented with acute onset of left leg weakness with last know well at 0230 this morning.  he has history of GBS in past with mild residual b/l leg weakness, but noted worsening left leg weakness today and presented to the ED for evaluation.  He had an NIHSS=2 and was given alteplase for acute neurological symptoms with time of onset within 4.5 hours.  CTA did not show LVO and he was not a candidate for neuro-intervention.  Neurology is asked to follow.    Interval history: moving left leg better yet today, able to walk independently to bathroom.  repeat head CT no blood, MRI brain no acute CVA    ROS neurology: Denies headache or dizziness. c/o left leg weakness, although he says it is improving and now ambulating short distance.  no numbness.  Denies speech/language deficits. Denies diplopia/blurred vision.  Denies confusion    MEDICATIONS  (STANDING):  aspirin enteric coated 81 milliGRAM(s) Oral daily  atorvastatin 20 milliGRAM(s) Oral at bedtime  heparin  Injectable 5000 Unit(s) SubCutaneous every 8 hours  magnesium oxide 400 milliGRAM(s) Oral daily  QUEtiapine 100 milliGRAM(s) Oral daily  QUEtiapine 200 milliGRAM(s) Oral at bedtime  tenofovir alafenamide 10 mG/wtewsvjrfldd139 mG/cobicistat 150 mG/emtricitabine 200 mG (GENVOYA) 1 Tablet(s) Oral daily    MEDICATIONS  (PRN):      Vital Signs Last 24 Hrs  T(C): 37.1 (28 Mar 2020 10:06), Max: 37.4 (28 Mar 2020 06:21)  T(F): 98.7 (28 Mar 2020 10:06), Max: 99.3 (28 Mar 2020 06:21)  HR: 100 (28 Mar 2020 10:06) (82 - 121)  BP: 132/73 (28 Mar 2020 10:06) (113/58 - 150/89)  BP(mean): --  RR: 18 (28 Mar 2020 10:06) (17 - 18)  SpO2: 99% (28 Mar 2020 10:06) (94% - 100%)  Detailed Neurologic Exam:    Mental status: The patient is awake and alert and has normal attention span.  The patient is fully oriented in 3 spheres. The patient is oriented to current events. The patient is able to name objects, follow commands, repeat sentences.    Cranial nerves: Pupils equal and react symmetrically to light. There is no visual field deficit to confrontation. Extraocular motion is full with no nystagmus. There is no ptosis. Facial sensation is intact. Facial musculature is symmetric. Palate elevates symmetrically. Shoulder shrug is normal. Tongue is midline.    Motor: There is normal bulk and tone.  There is no tremor.  Strength is 5/5 in the right arm and leg.   Strength is 5/5 in the left arm and 4+ to 5-/5 left leg    Sensation: Intact to light touch and pin in 4 extremities    Reflexes: 2+ throughout and plantar responses are flexor.    Cerebellar: There is no dysmetria on finger to nose testing.    Gait : deferred    LABS:                           11.4   3.70  )-----------( 217      ( 27 Mar 2020 06:14 )             35.1     03-27    139  |  100  |  12.0  ----------------------------<  90  3.8   |  26.0  |  0.82    Ca    9.2      27 Mar 2020 06:14  Mg     1.4     03-27    Lipid Profile (03.25.20 @ 20:44)    Total Cholesterol/HDL Ratio Measurement: 4.0 Ratio    Cholesterol, Serum: 134 mg/dL    Triglycerides, Serum: 136 mg/dL    HDL Cholesterol, Serum: 33: HDL Levels >/= 60 mg/dL are considered beneficial and a "negative" risk  factor.  Effective 08/15/2018: New reference range and interpretive comment. mg/dL    Direct LDL: 74:    RADIOLOGY & ADDITIONAL STUDIES (independently reviewed unless otherwise noted):  Recent neurological studies (images reviewed independently):  head CT 3/26- no acute blood, stroke or mass  MRI brain 3/25- no acute CVA on DWI, no blood and no mass    Previous neurological studies:  head CT no acute stroke, mass or blood  CTA head: no aneurysm, AVM, LVO or sig stenosis in COW  CTA neck: no sig carotid or vertebral stenosis  CT Perfusion head - CBF<30% volume 0ml, Tmax>6s volume =0ml

## 2020-03-28 NOTE — PROGRESS NOTE ADULT - ASSESSMENT
The patient is a 30y Male who is followed by neurology because of left leg weakness    Left leg weakness  possible CVA  received altelplase at 0602 3/25/2020    on ASA 81  repeat head CT  24 hours after tPA administration did not show blood  MRI brain did not show stroke  Continue PT  can keep BP normal  lipid panel, LDL= 74  goal LDL <100 given no stroke on MRI brain  DVT ppx    He has history of Guillain [place] Syndrome, no evidence on exam currently for recurrence    HIV  continue home anti-virals     Tobacco use   smokes about 1/2 PPD  smoking cessation was recommended on initial consult    Disposition  may need acute rehab on discharge if left leg weakness persists    There is no further neurologic workup suggested at this time.    He will need rehab on discharge  We will be available for reconsultation as needed.    Thank you.   Karri Stover MD, PhD  [phone]

## 2020-03-28 NOTE — PROGRESS NOTE ADULT - SUBJECTIVE AND OBJECTIVE BOX
Chart reviewed, Briefly hiv positive patient with history of Guillan [place] Syndrome admitted with left leg weakness c/w acute cva.  He received ateplase in ER.  Follow up MRI did not show evidence of acute cva  patient however has had difficulty waling and is awaiting acute rehab placement    Sharmaine (Unknown)    03-27-20 @ 07:01  -  03-28-20 @ 07:00  --------------------------------------------------------  IN: 960 mL / OUT: 1700 mL / NET: -740 mL    03-28-20 @ 07:01  -  03-28-20 @ 12:02  --------------------------------------------------------  IN: 0 mL / OUT: 700 mL / NET: -700 mL        03-27-20 @ 07:01  -  03-28-20 @ 07:00  --------------------------------------------------------  IN: 960 mL / OUT: 1700 mL / NET: -740 mL    03-28-20 @ 07:01  -  03-28-20 @ 12:02  --------------------------------------------------------  IN: 0 mL / OUT: 700 mL / NET: -700 mL    Complete Blood Count in AM (03.27.20 @ 06:14)    WBC Count: 3.70 K/uL    RBC Count: 3.88 M/uL    Hemoglobin: 11.4 g/dL    Hematocrit: 35.1 %    Mean Cell Volume: 90.5 fl    Mean Cell Hemoglobin: 29.4 pg    Mean Cell Hemoglobin Conc: 32.5 gm/dL    Red Cell Distrib Width: 11.7 %    Platelet Count - Automated: 217 K/uL      aspirin enteric coated 81 milliGRAM(s) Oral daily  atorvastatin 20 milliGRAM(s) Oral at bedtime  heparin  Injectable 5000 Unit(s) SubCutaneous every 8 hours  magnesium oxide 400 milliGRAM(s) Oral daily  QUEtiapine 100 milliGRAM(s) Oral daily  QUEtiapine 200 milliGRAM(s) Oral at bedtime  tenofovir alafenamide 10 mG/jvpqypgzxpbx083 mG/cobicistat 150 mG/emtricitabine 200 mG (GENVOYA) 1 Tablet(s) Oral daily  Basic Metabolic Panel in AM (03.27.20 @ 06:14)    Sodium, Serum: 139 mmol/L    Potassium, Serum: 3.8 mmol/L    Chloride, Serum: 100 mmol/L    Carbon Dioxide, Serum: 26.0 mmol/L    Anion Gap, Serum: 13 mmol/L    Blood Urea Nitrogen, Serum: 12.0 mg/dL    Creatinine, Serum: 0.82 mg/dL    Glucose, Serum: 90: Reference Range for Glucose has been amended as of 1/21/2020 mg/dL    Calcium, Total Serum: 9.2 mg/dL    eGFR if Non : 118: Interpretative comment  The units for eGFR are mL/min/1.73M2 (normalized body surface area). The  eGFR is calculated from a serum creatinine using the CKD-EPI equation.  Other variables required for calculation are race, age and sex. Among  patients with chronic kidney disease (CKD), the eGFR is useful in  determining the stage of disease according to KDOQI CKD classification.  All eGFR results are reported numerically with the following  interpretation.          GFR                    With                 Without     (ml/min/1.73 m2)    Kidney Damage       Kidney Damage        >= 90                    Stage 1                     Normal        60-89                    Stage 2                     Decreased GFR        30-59     Stage 3                     Stage 3        15-29                    Stage 4                     Stage 4        < 15                      Stage 5                     Stage 5  Each stage of CKD assumes that the associated GFR level has been in  effect for at least 3 months. Determination of stages one and two (with  eGFR > 59 ml/min/m2) requires estimation of kidney damage for at least 3  months as defined by structural or functional abnormalities.  Limitations: All estimates of GFR will be less accurate for patients at  extremes of muscle mass (including but not limited to frail elderly,  critically ill, or cancer patients), those with unusual diets, and those  with conditions associated with reduced secretion or extrarenal  elimination of creatinine. The eGFR equation is not recommended for use  in patients with unstable creatinine levels. mL/min/1.73M2    eGFR if African American: 137 mL/min/1.73M2      T(C): 37.1 (03-28-20 @ 10:06), Max: 37.4 (03-28-20 @ 06:21)  HR: 100 (03-28-20 @ 10:06) (82 - 121)  BP: 132/73 (03-28-20 @ 10:06) (113/58 - 150/89)  RR: 18 (03-28-20 @ 10:06) (17 - 18)  SpO2: 99% (03-28-20 @ 10:06) (94% - 100%)

## 2020-03-29 PROCEDURE — 99233 SBSQ HOSP IP/OBS HIGH 50: CPT

## 2020-03-29 RX ORDER — ALBUTEROL 90 UG/1
2.5 AEROSOL, METERED ORAL ONCE
Refills: 0 | Status: DISCONTINUED | OUTPATIENT
Start: 2020-03-29 | End: 2020-03-31

## 2020-03-29 RX ORDER — ALBUTEROL 90 UG/1
2 AEROSOL, METERED ORAL EVERY 6 HOURS
Refills: 0 | Status: DISCONTINUED | OUTPATIENT
Start: 2020-03-29 | End: 2020-04-03

## 2020-03-29 RX ORDER — ALBUTEROL 90 UG/1
2 AEROSOL, METERED ORAL
Qty: 0 | Refills: 0 | DISCHARGE
Start: 2020-03-29

## 2020-03-29 RX ADMIN — Medication 81 MILLIGRAM(S): at 12:18

## 2020-03-29 RX ADMIN — QUETIAPINE FUMARATE 200 MILLIGRAM(S): 200 TABLET, FILM COATED ORAL at 21:29

## 2020-03-29 RX ADMIN — MAGNESIUM OXIDE 400 MG ORAL TABLET 400 MILLIGRAM(S): 241.3 TABLET ORAL at 12:18

## 2020-03-29 RX ADMIN — QUETIAPINE FUMARATE 100 MILLIGRAM(S): 200 TABLET, FILM COATED ORAL at 12:18

## 2020-03-29 RX ADMIN — ATORVASTATIN CALCIUM 20 MILLIGRAM(S): 80 TABLET, FILM COATED ORAL at 21:28

## 2020-03-29 RX ADMIN — ELVITEGRAVIR, COBICISTAT, EMTRICITABINE, AND TENOFOVIR ALAFENAMIDE 1 TABLET(S): 150; 150; 200; 10 TABLET ORAL at 12:18

## 2020-03-29 NOTE — PROGRESS NOTE ADULT - SUBJECTIVE AND OBJECTIVE BOX
Patient seen and examined requested a nebulizer treatment as he felt a bit sob.  He has no observable distress.  He is currently homeless and the overall plan is to find  a rehab placement for him.  He is s/p tpa without any acute evidence of cva on mri. He has persistent chronic left leg weakness.      Sharmaine (Unknown)    03-28-20 @ 07:01  -  03-29-20 @ 07:00  --------------------------------------------------------  IN: 0 mL / OUT: 2125 mL / NET: -2125 mL        03-28-20 @ 07:01  -  03-29-20 @ 07:00  --------------------------------------------------------  IN: 0 mL / OUT: 2125 mL / NET: -2125 mL      ALBUTerol    0.083%. 2.5 milliGRAM(s) Nebulizer once  aspirin enteric coated 81 milliGRAM(s) Oral daily  atorvastatin 20 milliGRAM(s) Oral at bedtime  heparin  Injectable 5000 Unit(s) SubCutaneous every 8 hours  magnesium oxide 400 milliGRAM(s) Oral daily  QUEtiapine 100 milliGRAM(s) Oral daily  QUEtiapine 200 milliGRAM(s) Oral at bedtime  tenofovir alafenamide 10 mG/yblndzffiyur232 mG/cobicistat 150 mG/emtricitabine 200 mG (GENVOYA) 1 Tablet(s) Oral daily    T(C): 36.6 (03-29-20 @ 05:31), Max: 36.7 (03-28-20 @ 15:31)  HR: 83 (03-29-20 @ 05:31) (70 - 96)  BP: 132/58 (03-29-20 @ 05:31) (128/62 - 132/69)  RR: 17 (03-29-20 @ 05:31) (17 - 18)  SpO2: 96% (03-29-20 @ 05:31) (94% - 96%)

## 2020-03-30 PROCEDURE — 99233 SBSQ HOSP IP/OBS HIGH 50: CPT

## 2020-03-30 RX ADMIN — QUETIAPINE FUMARATE 200 MILLIGRAM(S): 200 TABLET, FILM COATED ORAL at 21:40

## 2020-03-30 RX ADMIN — ALBUTEROL 2 PUFF(S): 90 AEROSOL, METERED ORAL at 17:54

## 2020-03-30 RX ADMIN — QUETIAPINE FUMARATE 100 MILLIGRAM(S): 200 TABLET, FILM COATED ORAL at 09:41

## 2020-03-30 RX ADMIN — Medication 81 MILLIGRAM(S): at 09:41

## 2020-03-30 RX ADMIN — MAGNESIUM OXIDE 400 MG ORAL TABLET 400 MILLIGRAM(S): 241.3 TABLET ORAL at 09:41

## 2020-03-30 RX ADMIN — ELVITEGRAVIR, COBICISTAT, EMTRICITABINE, AND TENOFOVIR ALAFENAMIDE 1 TABLET(S): 150; 150; 200; 10 TABLET ORAL at 15:17

## 2020-03-30 RX ADMIN — ATORVASTATIN CALCIUM 20 MILLIGRAM(S): 80 TABLET, FILM COATED ORAL at 21:40

## 2020-03-30 NOTE — PROGRESS NOTE ADULT - SUBJECTIVE AND OBJECTIVE BOX
Patient in bed, comfortable  Reports no pain.  States that he has improved left LE weakness.  NO sensory changes.   Lip droop present but evolves.  MRI brain - ind rev:  1)  no diffusion restriction or MR evidence of acute ischemia. Periventricular white matter findings raise the possibility of an underlying inflammatory etiology and/or demyelinating disease. Further workup and assessment recommended..  2)  mucosal thickening noted in the sinuses. Mastoids are clear..     FUNCTIONAL PROGRESS  3/29  Bed Mobility  Bed Mobility Training Supine-to-Sit: minimum assist (75% patient effort)  Bed Mobility Training Limitations: impaired balance;  decreased strength;  decreased ROM;  decreased ability to use arms for pushing/pulling;  decreased ability to use legs for bridging/pushing    Sit-Stand Transfer Training  Transfer Training Sit-to-Stand Transfer: minimum assist (75% patient effort);  2 person assist;  full weight-bearing   rolling walker  Transfer Training Stand-to-Sit Transfer: minimum assist (75% patient effort);  moderate assist (50% patient effort);  2 person assist  Sit-to-Stand Transfer Training Transfer Safety Analysis: decreased balance;  decreased flexibility;  decreased ROM;  decreased strength;  impaired balance    Gait Training  Gait Training: minimum assist (75% patient effort);  moderate assist (50% patient effort);  2 person assist;  full weight-bearing   rolling walker;  5 feet  Gait Analysis: pt. with left knee buckling - educated on quad contraction with fair return;  decreased step length;  decreased stride length;  decreased toe clearance;  decreased strength;  impaired balance;  pt. attempting to squat x3 during amb dur to reports of urinary frequency. Assisted to chair for safety.  3/27  Bed Mobility  Bed Mobility Training Sit-to-Supine: supervsion;  verbal cues  Bed Mobility Training Supine-to-Sit: supervsion;  supervision  Bed Mobility Training Limitations: decreased strength;  impaired balance    Sit-Stand Transfer Training  Transfer Training Sit-to-Stand Transfer: moderate assist (50% patient effort);  2 person assist;  weight-bearing as tolerated   rolling walker  Transfer Training Stand-to-Sit Transfer: moderate assist (50% patient effort);  2 person assist;  weight-bearing as tolerated   rolling walker  Sit-to-Stand Transfer Training Transfer Safety Analysis: decreased proprioception;  pain;  decreased strength;  impaired balance;  rolling walker    Lower Body Dressing Training  Lower Body Dressing Training Rehab Potential: good, to achieve stated therapy goals  Lower Body Dressing Training Assistance: moderate assist (50% patient effort);  1 person assist;  educated on compensatory dressing techniques seatd edge of bed to don and doff socks and pants;  pain;  decreased strength;  impaired balance      3/26  Auditory Comprehension:   · Able to Identify Objects	within functional limits 	  · Answers Yes/No Questions	within functional limits 	  · Able to Follow Commands	within functional limits 	  · 1-step	yes 	  · 2-step	yes 	  · 3-step	yes 	  · Discourse	intact 	  · Right/Left Discrimination	intact 	  · Body Part ID	intact 	  · Open Ended Questions	intact 	    Verbal Expression:   · Verbal Expression	intact 	  · Automatic Speech	intact	  · Confrontational Naming	intact 	  · Responsive Naming	intact 	  · Repetition	intact	  · Fluency	intact	  · Conversational Speech	fluent with appropriate syntax & semantics	    Cognitive Linguistic Status Examination:   · Attention	intact 	  · Problem Solving	intact 	  · Reasoning	intact 	  · Organization	intact 	  · Comments	Informally judged to be WFL	    Motor Speech:   · Fluency	intact 	  · Articulation	precise 	  · Prosody	intact 	  · Rate	intact 	  · Comments	Pt reports speech to be consistent with pre-morbid status	      REVIEW OF SYSTEMS  Constitutional - No fever,  No fatigue  HEENT - No vertigo, No neck pain  Neurological - No headaches, No memory loss, No loss of strength, No numbness, No tremors  Skin - No rashes, No lesions   Musculoskeletal - No joint pain, No joint swelling, No muscle pain  Psychiatric - No depression, No anxiety    VITALS  T(C): 36.9 (03-30-20 @ 05:18), Max: 36.9 (03-29-20 @ 17:12)  HR: 101 (03-30-20 @ 05:18) (96 - 118)  BP: 111/70 (03-30-20 @ 05:18) (111/70 - 128/68)  RR: 16 (03-30-20 @ 05:18) (16 - 18)  SpO2: 98% (03-30-20 @ 05:18) (96% - 98%)  Wt(kg): --    MEDICATIONS   ALBUTerol    0.083%. 2.5 milliGRAM(s) once  ALBUTerol    90 MICROgram(s) HFA Inhaler 2 Puff(s) every 6 hours PRN  aspirin enteric coated 81 milliGRAM(s) daily  atorvastatin 20 milliGRAM(s) at bedtime  heparin  Injectable 5000 Unit(s) every 8 hours  magnesium oxide 400 milliGRAM(s) daily  QUEtiapine 100 milliGRAM(s) daily  QUEtiapine 200 milliGRAM(s) at bedtime  tenofovir alafenamide 10 mG/bsqquktgblou506 mG/cobicistat 150 mG/emtricitabine 200 mG (GENVOYA) 1 Tablet(s) daily      RECENT LABS/IMAGING    Reviewed       ---------  PHYSICAL EXAM  Constitutional - NAD, Comfortable  Extremities - No C/C/E, No calf tenderness  Neurologic Exam -                    Cognitive - AAOx3     Communication - Dysfluency     Motor - No focal deficits     Sensory - Intact to LT  Psychiatric - Mood WNL, Affect WNL    ASSESSMENT/PLAN  30y Male with history of GBS with new left leg weakness  CVA PPX - ASA, Lipitor, No acute CVA on MRI  Mood - Seroquel  HIV - Genvoya  Pain - Tylenol  DVT PPX - SCDs, Heparin  Rehab - Expect patient to achieve functional goals for DC HOME with OUTPATIENT.  Discussed with rehab team.

## 2020-03-30 NOTE — PROGRESS NOTE ADULT - ASSESSMENT
Problem: Cerebrovascular accident (CVA), unspecified mechanism.  Plan: cva ruled out by MRI but has persistent leg weakness  plan is placement to acute rehab if patient refuses is homeless so will need  for shelter placement.      Problem/Plan - 2:  ·  Problem: Asymptomatic HIV infection.  Plan: continue hiv meds.      Problem/Plan - 3:  ·  Problem: Guillain [place] syndrome.  Plan: not likely the cause of current left leg weakness.      Problem/Plan - 4:  ·  Problem: Mild intermittent asthma without complication.  Plan: one nebulizer treatment  will give albuterol mdi.     Disp: Homeless.  For [facility] placement. PT

## 2020-03-30 NOTE — PROGRESS NOTE ADULT - SUBJECTIVE AND OBJECTIVE BOX
CC: George lower ext weakness. MRI brain showing no infarct.  HIV. Guillain [place] syndrome.   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 Presents to ED c/o b/l leg weakness L>R. Patient states cannot feel legs; states tingling from toes to back of legs extending upward to back. States suddenly began while walking, unable to ambulate secondary to weakness. States feels similar to Guillain [place] flare but at that time included b/l LE weakness, last flare January 2017. Unknown CD4 count, viral load undetectable. Patient No further complaints at this time.     25-Mar-2020 05:29	  25-Mar-2020 05:40	  25-Mar-2020 05:44	  No acute findings tpa administered 06:02	    NIHSS=2 (might be baseline) (25 Mar 2020 08:01)    REVIEW OF SYSTEMS:    Patient denied fever, chills, abdominal pain, nausea, vomiting, cough, shortness of breath, chest pain or palpitations    Vital Signs Last 24 Hrs  T(C): 36.9 (30 Mar 2020 05:18), Max: 36.9 (29 Mar 2020 17:12)  T(F): 98.5 (30 Mar 2020 05:18), Max: 98.5 (29 Mar 2020 17:12)  HR: 101 (30 Mar 2020 05:18) (96 - 112)  BP: 111/70 (30 Mar 2020 05:18) (111/70 - 128/68)  BP(mean): --  RR: 16 (30 Mar 2020 05:18) (16 - 18)  SpO2: 98% (30 Mar 2020 05:18) (96% - 98%)I&O's Summary    29 Mar 2020 07:01  -  30 Mar 2020 07:00  --------------------------------------------------------  IN: 240 mL / OUT: 900 mL / NET: -660 mL      PHYSICAL EXAM:  GENERAL: NAD,   HEENT: PERRL, +EOMI, anicteric, no [place]  NECK: Supple, No JVD   CHEST/LUNG: CTA bilaterally; Normal effort  HEART: S1S2 Normal intensity, no murmurs, gallops or rubs noted  ABDOMEN: Soft, BS Normoactive, NT, ND, no HSM noted  EXTREMITIES:  2+ radial and DP pulses noted, no clubbing, cyanosis, or edema noted, Limited mobility   SKIN: No rashes or lesions noted  NEURO: A&O, george lower ext paresis, CN II-XII intact  PSYCH: Depressed mood and affect; insight/judgement inappropriate  LABS:              RADIOLOGY & ADDITIONAL TESTS:    MEDICATIONS:  MEDICATIONS  (STANDING):  ALBUTerol    0.083%. 2.5 milliGRAM(s) Nebulizer once  aspirin enteric coated 81 milliGRAM(s) Oral daily  atorvastatin 20 milliGRAM(s) Oral at bedtime  heparin  Injectable 5000 Unit(s) SubCutaneous every 8 hours  magnesium oxide 400 milliGRAM(s) Oral daily  QUEtiapine 100 milliGRAM(s) Oral daily  QUEtiapine 200 milliGRAM(s) Oral at bedtime  tenofovir alafenamide 10 mG/sfljjiutszhb875 mG/cobicistat 150 mG/emtricitabine 200 mG (GENVOYA) 1 Tablet(s) Oral daily    MEDICATIONS  (PRN):  ALBUTerol    90 MICROgram(s) HFA Inhaler 2 Puff(s) Inhalation every 6 hours PRN Bronchospasm

## 2020-03-31 LAB
ALBUMIN SERPL ELPH-MCNC: 3.9 G/DL — SIGNIFICANT CHANGE UP (ref 3.3–5.2)
ALP SERPL-CCNC: 61 U/L — SIGNIFICANT CHANGE UP (ref 40–120)
ALT FLD-CCNC: 22 U/L — SIGNIFICANT CHANGE UP
AMMONIA BLD-MCNC: 30 UMOL/L — SIGNIFICANT CHANGE UP (ref 11–55)
AMPHET UR-MCNC: NEGATIVE — SIGNIFICANT CHANGE UP
ANION GAP SERPL CALC-SCNC: 15 MMOL/L — SIGNIFICANT CHANGE UP (ref 5–17)
APAP SERPL-MCNC: <7.5 UG/ML — LOW (ref 10–26)
AST SERPL-CCNC: 39 U/L — SIGNIFICANT CHANGE UP
BARBITURATES UR SCN-MCNC: NEGATIVE — SIGNIFICANT CHANGE UP
BENZODIAZ UR-MCNC: NEGATIVE — SIGNIFICANT CHANGE UP
BILIRUB SERPL-MCNC: 0.4 MG/DL — SIGNIFICANT CHANGE UP (ref 0.4–2)
BUN SERPL-MCNC: 19 MG/DL — SIGNIFICANT CHANGE UP (ref 8–20)
CALCIUM SERPL-MCNC: 9.7 MG/DL — SIGNIFICANT CHANGE UP (ref 8.6–10.2)
CHLORIDE SERPL-SCNC: 93 MMOL/L — LOW (ref 98–107)
CK MB CFR SERPL CALC: 2.9 NG/ML — SIGNIFICANT CHANGE UP (ref 0–6.7)
CK SERPL-CCNC: 456 U/L — HIGH (ref 30–200)
CO2 SERPL-SCNC: 25 MMOL/L — SIGNIFICANT CHANGE UP (ref 22–29)
COCAINE METAB.OTHER UR-MCNC: POSITIVE
CREAT SERPL-MCNC: 0.99 MG/DL — SIGNIFICANT CHANGE UP (ref 0.5–1.3)
GAS PNL BLDA: SIGNIFICANT CHANGE UP
GLUCOSE SERPL-MCNC: 116 MG/DL — HIGH (ref 70–99)
MAGNESIUM SERPL-MCNC: 1.3 MG/DL — LOW (ref 1.6–2.6)
METHADONE UR-MCNC: NEGATIVE — SIGNIFICANT CHANGE UP
OPIATES UR-MCNC: POSITIVE
PCP SPEC-MCNC: SIGNIFICANT CHANGE UP
PCP UR-MCNC: NEGATIVE — SIGNIFICANT CHANGE UP
PHOSPHATE SERPL-MCNC: 5.3 MG/DL — HIGH (ref 2.4–4.7)
POTASSIUM SERPL-MCNC: 4.3 MMOL/L — SIGNIFICANT CHANGE UP (ref 3.5–5.3)
POTASSIUM SERPL-SCNC: 4.3 MMOL/L — SIGNIFICANT CHANGE UP (ref 3.5–5.3)
PROT SERPL-MCNC: 7.6 G/DL — SIGNIFICANT CHANGE UP (ref 6.6–8.7)
RAPID RVP RESULT: SIGNIFICANT CHANGE UP
SALICYLATES SERPL-MCNC: <0.6 MG/DL — LOW (ref 10–20)
SODIUM SERPL-SCNC: 133 MMOL/L — LOW (ref 135–145)
THC UR QL: NEGATIVE — SIGNIFICANT CHANGE UP
TROPONIN T SERPL-MCNC: <0.01 NG/ML — SIGNIFICANT CHANGE UP (ref 0–0.06)

## 2020-03-31 PROCEDURE — 70450 CT HEAD/BRAIN W/O DYE: CPT | Mod: 26

## 2020-03-31 PROCEDURE — 99233 SBSQ HOSP IP/OBS HIGH 50: CPT

## 2020-03-31 PROCEDURE — 71045 X-RAY EXAM CHEST 1 VIEW: CPT | Mod: 26

## 2020-03-31 PROCEDURE — 99232 SBSQ HOSP IP/OBS MODERATE 35: CPT

## 2020-03-31 RX ORDER — PROPOFOL 10 MG/ML
5 INJECTION, EMULSION INTRAVENOUS
Qty: 1000 | Refills: 0 | Status: DISCONTINUED | OUTPATIENT
Start: 2020-03-31 | End: 2020-03-31

## 2020-03-31 RX ORDER — PROPOFOL 10 MG/ML
5 INJECTION, EMULSION INTRAVENOUS
Qty: 500 | Refills: 0 | Status: DISCONTINUED | OUTPATIENT
Start: 2020-03-31 | End: 2020-03-31

## 2020-03-31 RX ORDER — SUCCINYLCHOLINE CHLORIDE 100 MG/5ML
100 SYRINGE (ML) INTRAVENOUS ONCE
Refills: 0 | Status: COMPLETED | OUTPATIENT
Start: 2020-03-31 | End: 2020-03-31

## 2020-03-31 RX ORDER — ATORVASTATIN CALCIUM 80 MG/1
40 TABLET, FILM COATED ORAL AT BEDTIME
Refills: 0 | Status: DISCONTINUED | OUTPATIENT
Start: 2020-03-31 | End: 2020-04-03

## 2020-03-31 RX ORDER — SODIUM CHLORIDE 9 MG/ML
1000 INJECTION INTRAMUSCULAR; INTRAVENOUS; SUBCUTANEOUS ONCE
Refills: 0 | Status: COMPLETED | OUTPATIENT
Start: 2020-03-31 | End: 2020-03-31

## 2020-03-31 RX ORDER — CHLORHEXIDINE GLUCONATE 213 G/1000ML
15 SOLUTION TOPICAL EVERY 12 HOURS
Refills: 0 | Status: DISCONTINUED | OUTPATIENT
Start: 2020-03-31 | End: 2020-03-31

## 2020-03-31 RX ORDER — MAGNESIUM SULFATE 500 MG/ML
2 VIAL (ML) INJECTION
Refills: 0 | Status: COMPLETED | OUTPATIENT
Start: 2020-03-31 | End: 2020-03-31

## 2020-03-31 RX ORDER — ETOMIDATE 2 MG/ML
20 INJECTION INTRAVENOUS ONCE
Refills: 0 | Status: COMPLETED | OUTPATIENT
Start: 2020-03-31 | End: 2020-03-31

## 2020-03-31 RX ORDER — CHLORHEXIDINE GLUCONATE 213 G/1000ML
1 SOLUTION TOPICAL DAILY
Refills: 0 | Status: DISCONTINUED | OUTPATIENT
Start: 2020-03-31 | End: 2020-04-02

## 2020-03-31 RX ORDER — LEVETIRACETAM 250 MG/1
1000 TABLET, FILM COATED ORAL ONCE
Refills: 0 | Status: COMPLETED | OUTPATIENT
Start: 2020-03-31 | End: 2020-03-31

## 2020-03-31 RX ORDER — VECURONIUM BROMIDE 20 MG/1
5 INJECTION, POWDER, FOR SOLUTION INTRAVENOUS ONCE
Refills: 0 | Status: COMPLETED | OUTPATIENT
Start: 2020-03-31 | End: 2020-03-31

## 2020-03-31 RX ORDER — CALCIUM GLUCONATE 100 MG/ML
2 VIAL (ML) INTRAVENOUS ONCE
Refills: 0 | Status: COMPLETED | OUTPATIENT
Start: 2020-03-31 | End: 2020-03-31

## 2020-03-31 RX ORDER — ASPIRIN/CALCIUM CARB/MAGNESIUM 324 MG
300 TABLET ORAL DAILY
Refills: 0 | Status: DISCONTINUED | OUTPATIENT
Start: 2020-03-31 | End: 2020-03-31

## 2020-03-31 RX ORDER — MAGNESIUM SULFATE 500 MG/ML
2 VIAL (ML) INJECTION ONCE
Refills: 0 | Status: COMPLETED | OUTPATIENT
Start: 2020-03-31 | End: 2020-03-31

## 2020-03-31 RX ADMIN — PROPOFOL 2.89 MICROGRAM(S)/KG/MIN: 10 INJECTION, EMULSION INTRAVENOUS at 12:01

## 2020-03-31 RX ADMIN — ETOMIDATE 20 MILLIGRAM(S): 2 INJECTION INTRAVENOUS at 10:02

## 2020-03-31 RX ADMIN — Medication 1.25 MILLIGRAM(S): at 17:33

## 2020-03-31 RX ADMIN — SODIUM CHLORIDE 1000 MILLILITER(S): 9 INJECTION INTRAMUSCULAR; INTRAVENOUS; SUBCUTANEOUS at 22:20

## 2020-03-31 RX ADMIN — CHLORHEXIDINE GLUCONATE 15 MILLILITER(S): 213 SOLUTION TOPICAL at 17:33

## 2020-03-31 RX ADMIN — QUETIAPINE FUMARATE 200 MILLIGRAM(S): 200 TABLET, FILM COATED ORAL at 21:50

## 2020-03-31 RX ADMIN — Medication 100 MILLIGRAM(S): at 10:02

## 2020-03-31 RX ADMIN — ATORVASTATIN CALCIUM 40 MILLIGRAM(S): 80 TABLET, FILM COATED ORAL at 21:50

## 2020-03-31 RX ADMIN — PROPOFOL 2.89 MICROGRAM(S)/KG/MIN: 10 INJECTION, EMULSION INTRAVENOUS at 14:15

## 2020-03-31 RX ADMIN — Medication 200 GRAM(S): at 16:50

## 2020-03-31 RX ADMIN — Medication 2 MILLIGRAM(S): at 11:30

## 2020-03-31 RX ADMIN — VECURONIUM BROMIDE 5 MILLIGRAM(S): 20 INJECTION, POWDER, FOR SOLUTION INTRAVENOUS at 12:03

## 2020-03-31 RX ADMIN — Medication 50 GRAM(S): at 22:30

## 2020-03-31 RX ADMIN — LEVETIRACETAM 400 MILLIGRAM(S): 250 TABLET, FILM COATED ORAL at 12:00

## 2020-03-31 RX ADMIN — Medication 50 GRAM(S): at 18:22

## 2020-03-31 RX ADMIN — HEPARIN SODIUM 5000 UNIT(S): 5000 INJECTION INTRAVENOUS; SUBCUTANEOUS at 21:51

## 2020-03-31 NOTE — ED PROCEDURE NOTE - CPROC ED GASTRIC INTUB DETAIL1
The orogastric tube (see size above) was inserted via the anatomic location./Gastric tube connected to low continuous suction./Placement was confirmed by aspiration of gastric secretions.

## 2020-03-31 NOTE — CONSULT NOTE ADULT - SUBJECTIVE AND OBJECTIVE BOX
[place] [facility]                                                               Office:  [address] [address]                                                              Telephone: [phone]. Fax:[phone]                                                                        CARDIOLOGY CONSULTATION NOTE                                                                                             Consult requested by:  Dr. Richards  Reason for Consultation: Acute changes on tele  History obtained by: Patient and medical record   obtained: No    Chief complaint:    Patient is a 30y old  Male who presents with a chief complaint of stroke, s/p tPa (31 Mar 2020 11:12)        HPI: Pt is a 31 y/o male with medical history of HIV, Guillain [place] since 2017 w/ residual b/l LE weakness Left >RIght who presents to [facility]-ED with c/o bilateral leg weakness. As per ED Provider note " c/o b/l leg weakness L>R. Patient states cannot feel legs; states tingling from toes to back of legs extending upward to back. States suddenly began while walking, unable to ambulate secondary to weakness. States feels similar to Guillain [place] flare but at that time included b/l LE weakness, last flare January 2017. Unknown CD4 count, viral load undetectable. Pt became a RRT when significant SB noted on monitor with ST elevations. Pt was found to have agonal breathing and was intubated. When           REVIEW OF SYMPTOMS:     CONSTITUTIONAL: No fever, weight loss, or fatigue  ENMT:  No difficulty hearing, tinnitus, vertigo; No sinus or throat pain  NECK: No pain or stiffness  CARDIOVASCULAR: No chest pain, dyspnea, syncope, palpitations, dizziness, Orthopnea, Paroxsymal nocturnal dyspnea  RESPIRATORY: No Dyspnea on exertion, Shortness of breath, cough, wheezing  : No dysuria, no hematuria   GI: No dark color stool, no melena, no diarrhea, no constipation, no abdominal pain   NEURO: No headache, no dizziness, no slurred speech   MUSCULOSKELETAL: No joint pain or swelling; No muscle, back, or extremity pain  PSYCH: No agitation, no anxiety.    ALL OTHER REVIEW OF SYSTEMS ARE NEGATIVE.      PREVIOUS DIAGNOSTIC TESTING  ECHO FINDINGS:      STRESS FINDINGS:      CATHETERIZATION FINDINGS:         ALLERGIES: Allergies    Ceclor (Unknown)    Intolerances          PAST MEDICAL HISTORY  HIV (human immunodeficiency virus infection)  Asthma  Guillain-[place]  Guillain BarrÃ© syndrome  HIV (human immunodeficiency virus infection)      PAST SURGICAL HISTORY  No significant past surgical history  History of orthopedic surgery      FAMILY HISTORY:  No pertinent family history in first degree relatives      SOCIAL HISTORY:  Denies smoking/alcohol/drugs  CIGARETTES:     ALCOHOL:  DRUGS:      CURRENT MEDICATIONS:    ALBUTerol    0.083%.   propofol Infusion  QUEtiapine  QUEtiapine  aspirin enteric coated  atorvastatin  heparin  Injectable  magnesium oxide  tenofovir alafenamide 10 mG/jzosfqfecnmt476 mG/cobicistat 150 mG/emtricitabine 200 mG (GENVOYA)        HOME MEDICATIONS:      Vital Signs Last 24 Hrs  T(C): 36.6 (31 Mar 2020 12:07), Max: 36.7 (31 Mar 2020 08:56)  T(F): 97.9 (31 Mar 2020 12:07), Max: 98.1 (31 Mar 2020 08:56)  HR: 114 (31 Mar 2020 12:07) (97 - 114)  BP: 107/55 (31 Mar 2020 12:07) (107/55 - 115/72)  BP(mean): --  RR: 18 (31 Mar 2020 12:07) (18 - 18)  SpO2: 100% (31 Mar 2020 12:07) (97% - 100%)      PHYSICAL EXAM:  Constitutional: Comfortable . No acute distress.   HEENT: Atraumatic and normocephalic , neck is supple . no JVD. No carotid bruit. PEERL   CNS: A&Ox3. No focal deficits. EOMI. Cranial nerves II-IX are intact.   Lymph Nodes: Cervical : Not palpable.  Respiratory: CTAB  Cardiovascular: S1S2 RRR. No murmur/rubs or gallop.  Gastrointestinal: Soft non-tender and non distended . +Bowel sounds. negative Ulloa's sign.  Extremities: No edema.   Psychiatric: Calm . no agitation.  Skin: No skin rash/ulcers visualized to face, hands or feet.    Intake and output:     LABS:            ;p-BNP=        INTERPRETATION OF TELEMETRY: Reviewed by me.   ECG: Reviewed by me.     RADIOLOGY & ADDITIONAL STUDIES:    X-ray:  reviewed by me.   CT scan:   MRI: [place] [facility]                                                               Office:  [address] [address]                                                              Telephone: [phone]. Fax:[phone]                                                                        CARDIOLOGY CONSULTATION NOTE                                                                                             Consult requested by:  Dr. Richards  Reason for Consultation: Acute changes on tele  History obtained by: Patient and medical record   obtained: No    Chief complaint:    Patient is a 30y old  Male who presents with a chief complaint of stroke, s/p tPa (31 Mar 2020 11:12)        HPI: Pt is a 29 y/o male with medical history of HIV, Guillain [place] since 2017 w/ residual b/l LE weakness Left >RIght who presents to [facility]-ED with c/o bilateral leg weakness. As per ED Provider note " c/o b/l leg weakness L>R. Patient states cannot feel legs; states tingling from toes to back of legs extending upward to back. States suddenly began while walking, unable to ambulate secondary to weakness. States feels similar to Guillain [place] flare but at that time included b/l LE weakness, last flare January 2017. Unknown CD4 count, viral load undetectable. Pt became a RRT when significant SB noted on monitor with ST elevations. Pt was found to have agonal breathing and was intubated. When bedside ECG completed, no ST elevation found on ECG and ST elevation resolved on tele.         REVIEW OF SYMPTOMS:     CONSTITUTIONAL: No fever, weight loss, or fatigue  ENMT:  No difficulty hearing, tinnitus, vertigo; No sinus or throat pain  NECK: No pain or stiffness  CARDIOVASCULAR: See HPI  RESPIRATORY: No Dyspnea on exertion, Shortness of breath, cough, wheezing  : No dysuria, no hematuria   GI: No dark color stool, no melena, no diarrhea, no constipation, no abdominal pain   NEURO: No headache, no dizziness, no slurred speech   MUSCULOSKELETAL: No joint pain or swelling; No muscle, back, or extremity pain  PSYCH: No agitation, no anxiety.    ALL OTHER REVIEW OF SYSTEMS ARE NEGATIVE.      PREVIOUS DIAGNOSTIC TESTING  ECHO FINDINGS: < from: TTE Echo Complete w/ Contrast w/ Doppler (03.25.20 @ 10:51) >  Summary:   1. Mobile intra-atrial septum.   2. Intravenous injection of agitated saline demonstrates the presence of a patent foramen ovale.   3. There is mild concentric left ventricular hypertrophy.   4. Left ventricular ejection fraction, by visual estimation, is 65 to 70%.   5. Normal right ventricular size and function.   6. Mild mitral valve regurgitation.   7. There is no evidence of pericardial effusion.    < end of copied text >       ALLERGIES: Allergies    Ceclor (Unknown)    Intolerances      PAST MEDICAL HISTORY  HIV (human immunodeficiency virus infection)  Asthma  Guillain-[place]  Guillain BarrÃ© syndrome  HIV (human immunodeficiency virus infection)      PAST SURGICAL HISTORY  No significant past surgical history  History of orthopedic surgery      FAMILY HISTORY:  No pertinent family history in first degree relatives      SOCIAL HISTORY:    CIGARETTES:   7 cigg/day  ALCOHOL: Denies  DRUGS: Admits to Marijuana use      CURRENT MEDICATIONS:  ALBUTerol    0.083%.  propofol Infusion  QUEtiapine  QUEtiapine  aspirin enteric coated  atorvastatin  heparin  Injectable  magnesium oxide  tenofovir alafenamide 10 mG/yarxsxdrfwdx368 mG/cobicistat 150 mG/emtricitabine 200 mG (GENVOYA)        HOME MEDICATIONS:    albuterol 90 mcg/inh inhalation aerosol: 2 puff(s) inhaled every 6 hours, As needed, Bronchospasm (29 Mar 2020 13:39)  Genvoya oral tablet: 1 tab(s) orally once a day (25 Mar 2020 07:55)  SEROquel 100 mg oral tablet: 100mg in the morning and 200mg at bedtime (25 Mar 2020 07:55)      Vital Signs Last 24 Hrs  T(C): 36.6 (31 Mar 2020 12:07), Max: 36.7 (31 Mar 2020 08:56)  T(F): 97.9 (31 Mar 2020 12:07), Max: 98.1 (31 Mar 2020 08:56)  HR: 114 (31 Mar 2020 12:07) (97 - 114)  BP: 107/55 (31 Mar 2020 12:07) (107/55 - 115/72)  RR: 18 (31 Mar 2020 12:07) (18 - 18)  SpO2: 100% (31 Mar 2020 12:07) (97% - 100%)      PHYSICAL EXAM:  Due to the nature of this patient's COVID-19 isolation status, no bedside physical exam done to limit spread of infection.  Examination highlights were provided by bedside nurse. Objective data were reviewed in detail.     Intake and output:     LABS:            ;p-BNP=        INTERPRETATION OF TELEMETRY: ST HR @ 114  ECG: ST HR @ 133    RADIOLOGY & ADDITIONAL STUDIES:    X-ray: < from: Xray Chest 1 View- PORTABLE-Urgent (03.31.20 @ 11:16) >  IMPRESSION: Mild congestive changes.    < end of copied text >    CT scan:   < from: CT Angio Neck w/ IV Cont (03.25.20 @ 06:24) >  IMPRESSION:  CT ANGIOGRAPHY NECK:  1.  Patent cervical vasculature.  No hemodynamically significant carotid stenosis using NASCET criteria.  No flow-limiting vertebral artery stenosis.  No evidence of dissection.  2.  Prominent thymic tissue in the anterior mediastinum.Please note that CT scan cannot accurately distinguish between thymic hyperplasia and thymic malignancy.  MRI evaluation may be obtained as clinically warranted.  3.  Enlarged adenoids.    < end of copied text >    < from: CT Head No Cont (03.31.20 @ 11:11) >    IMPRESSION: No acute intracranial pathology. Findings discussed with resident Dr Danuta Nation.    < end of copied text >    MRI: < from: MR Head No Cont (03.25.20 @ 18:57) >  IMPRESSION:    1)  no diffusion restriction or MR evidence of acute ischemia. Periventricular white matter findings raise the possibility of an underlying inflammatory etiology and/or demyelinating disease. Further workup and assessment recommended..  2)  mucosal thickening noted in the sinuses. Mastoids are clear..     < end of copied text > [place] [facility]                                                               Office:  [address] [address]                                                              Telephone: [phone]. Fax:[phone]                                                                        CARDIOLOGY CONSULTATION NOTE                                                                                             Consult requested by:  Dr. Richards  Reason for Consultation: Acute changes on tele  History obtained by: Patient and medical record   obtained: No    Chief complaint:    Patient is a 30y old  Male who presents with a chief complaint of stroke, s/p tPa (31 Mar 2020 11:12)        HPI: Pt is a 29 y/o male with medical history of HIV, Guillain [place] since 2017 w/ residual b/l LE weakness Left >RIght who presents to [facility]-ED with c/o bilateral leg weakness. As per ED Provider note " c/o b/l leg weakness L>R. Patient states cannot feel legs; states tingling from toes to back of legs extending upward to back. States suddenly began while walking, unable to ambulate secondary to weakness. States feels similar to Guillain [place] flare but at that time included b/l LE weakness, last flare January 2017. Unknown CD4 count, viral load undetectable. Pt became a RRT when significant SB noted on monitor with ST elevations. Pt was found to have agonal breathing and was intubated. When bedside ECG completed, no ST elevation found on ECG and ST elevation resolved on tele.         REVIEW OF SYMPTOMS:     CONSTITUTIONAL: No fever, weight loss, or fatigue  ENMT:  No difficulty hearing, tinnitus, vertigo; No sinus or throat pain  NECK: No pain or stiffness  CARDIOVASCULAR: See HPI  RESPIRATORY: No Dyspnea on exertion, Shortness of breath, cough, wheezing  : No dysuria, no hematuria   GI: No dark color stool, no melena, no diarrhea, no constipation, no abdominal pain   NEURO: No headache, no dizziness, no slurred speech   MUSCULOSKELETAL: No joint pain or swelling; No muscle, back, or extremity pain  PSYCH: No agitation, no anxiety.    ALL OTHER REVIEW OF SYSTEMS ARE NEGATIVE.      PREVIOUS DIAGNOSTIC TESTING  ECHO FINDINGS: < from: TTE Echo Complete w/ Contrast w/ Doppler (03.25.20 @ 10:51) >  Summary:   1. Mobile intra-atrial septum.   2. Intravenous injection of agitated saline demonstrates the presence of a patent foramen ovale.   3. There is mild concentric left ventricular hypertrophy.   4. Left ventricular ejection fraction, by visual estimation, is 65 to 70%.   5. Normal right ventricular size and function.   6. Mild mitral valve regurgitation.   7. There is no evidence of pericardial effusion.    < end of copied text >       ALLERGIES: Allergies    Ceclor (Unknown)    Intolerances      PAST MEDICAL HISTORY  HIV (human immunodeficiency virus infection)  Asthma  Guillain-[place]  Guillain BarrÃ© syndrome  HIV (human immunodeficiency virus infection)      PAST SURGICAL HISTORY  No significant past surgical history  History of orthopedic surgery      FAMILY HISTORY:  No pertinent family history in first degree relatives      SOCIAL HISTORY:    CIGARETTES:   7 cigg/day  ALCOHOL: Denies  DRUGS: Admits to Marijuana use      CURRENT MEDICATIONS:  ALBUTerol    0.083%.  propofol Infusion  QUEtiapine  QUEtiapine  aspirin enteric coated  atorvastatin  heparin  Injectable  magnesium oxide  tenofovir alafenamide 10 mG/eqhfynrcomid256 mG/cobicistat 150 mG/emtricitabine 200 mG (GENVOYA)        HOME MEDICATIONS:    albuterol 90 mcg/inh inhalation aerosol: 2 puff(s) inhaled every 6 hours, As needed, Bronchospasm (29 Mar 2020 13:39)  Genvoya oral tablet: 1 tab(s) orally once a day (25 Mar 2020 07:55)  SEROquel 100 mg oral tablet: 100mg in the morning and 200mg at bedtime (25 Mar 2020 07:55)      Vital Signs Last 24 Hrs  T(C): 36.6 (31 Mar 2020 12:07), Max: 36.7 (31 Mar 2020 08:56)  T(F): 97.9 (31 Mar 2020 12:07), Max: 98.1 (31 Mar 2020 08:56)  HR: 114 (31 Mar 2020 12:07) (97 - 114)  BP: 107/55 (31 Mar 2020 12:07) (107/55 - 115/72)  RR: 18 (31 Mar 2020 12:07) (18 - 18)  SpO2: 100% (31 Mar 2020 12:07) (97% - 100%)      PHYSICAL EXAM:  Due to the nature of this patient's COVID-19 isolation status, no bedside physical exam done to limit spread of infection.  Examination highlights were provided by bedside nurse. Objective data were reviewed in detail.     Intake and output:     LABS:            ;p-BNP=        INTERPRETATION OF TELEMETRY: ST HR @ 114  ECG: ST HR @ 133    RADIOLOGY & ADDITIONAL STUDIES:    X-ray: < from: Xray Chest 1 View- PORTABLE-Urgent (03.31.20 @ 11:16) >  IMPRESSION: Mild congestive changes.    < end of copied text >    CT scan:   < from: CT Angio Neck w/ IV Cont (03.25.20 @ 06:24) >  IMPRESSION:  CT ANGIOGRAPHY NECK:  1.  Patent cervical vasculature.  No hemodynamically significant carotid stenosis using NASCET criteria.  No flow-limiting vertebral artery stenosis.  No evidence of dissection.  2.  Prominent thymic tissue in the anterior mediastinum.Please note that CT scan cannot accurately distinguish between thymic hyperplasia and thymic malignancy.  MRI evaluation may be obtained as clinically warranted.  3.  Enlarged adenoids.    < end of copied text >    < from: CT Head No Cont (03.31.20 @ 11:11) >    IMPRESSION: No acute intracranial pathology. Findings discussed with resident Dr Danuta Nation.    < end of copied text >    MRI: < from: MR Head No Cont (03.25.20 @ 18:57) >  IMPRESSION:    1)  no diffusion restriction or MR evidence of acute ischemia. Periventricular white matter findings raise the possibility of an underlying inflammatory etiology and/or demyelinating disease. Further workup and assessment recommended..  2)  mucosal thickening noted in the sinuses. Mastoids are clear..     < end of copied text >      < from: TTE Echo Complete w/ Contrast w/ Doppler (03.25.20 @ 10:51) >  Summary:   1. Mobile intra-atrial septum.   2. Intravenous injection of agitated saline demonstrates the presence of a patent foramen ovale.   3. There is mild concentric left ventricular hypertrophy.   4. Left ventricular ejection fraction, by visual estimation, is 65 to 70%.   5. Normal right ventricular size and function.   6. Mild mitral valve regurgitation.   7. There is no evidence of pericardial effusion.    MD Ricardo, RPVI Electronically signed on 3/25/2020 at 5:12:41 PM    < end of copied text >

## 2020-03-31 NOTE — RAPID RESPONSE TEAM SUMMARY - NSMEDICATIONSRRT_GEN_ALL_CORE
Etomidate  succinylcholine Etomidate 20 mg IVx1  succinylcholine 100 mg IV x1  10 mg vecuronium x1  keppra 1000mg Iv x1  propofol drip

## 2020-03-31 NOTE — RAPID RESPONSE TEAM SUMMARY - NSSITUATIONBACKGROUNDRRT_GEN_ALL_CORE
30M PMHx HIV, Guillain [place] since 2017 w/ residual b/l LE weakness L>R, Presents to ED c/o b/l leg weakness L>R. last flare January 2017. Unknown CD4 count, viral load undetectable. In ED on 3/25, patient had sudden onset LLE weakness, NIHSS of 2, +drift, s/p tpa administration. CTA neagtive, not candidate for neuro intervention. Patient had persistent LLE weakness, and was being monitored on tele.   Rapid response called this morning for acute change in mental status, non responsiveness as well as changes noted on monitor by monitor tech (tachycardic to 120s), patient was noted to have agonal breathing, slumped over in bed and subsequently became hypoxic without improvement  Patient now s/p intubation by ED resident.   See RR sheet for full vitals  Physical Exam:  Gen: patient non responsive  CV: S1/S2+  Resp: unable to assess  abdomen: soft, ND 30M PMHx HIV, Guillain [place] since 2017 w/ residual b/l LE weakness L>R, Presents to ED c/o b/l leg weakness L>R. last flare January 2017. Unknown CD4 count, viral load undetectable. In ED on 3/25, patient had sudden onset LLE weakness, NIHSS of 2, +drift, s/p tpa administration. CTA negative, not candidate for neuro intervention. Patient had persistent LLE weakness, and was being monitored on tele.   Rapid response called this morning for acute change in mental status, non responsiveness as well as changes noted on monitor by monitor tech (tachycardic to 120s), patient was noted to have agonal breathing, slumped over in bed and subsequently became hypoxic without improvement  Patient now s/p intubation by ED resident.   See RR sheet for full vitals  Physical Exam:  Gen: patient non responsive  CV: S1/S2+  Resp: unable to assess  abdomen: soft, ND    Patient started on propofol drip, neurology reconsulted for eval s/p seizure/post ictal state, given presentation.   Transfer to ICU 30M PMHx HIV, Guillain [place] since 2017 w/ residual b/l LE weakness L>R, Presents to ED c/o b/l leg weakness L>R. last flare January 2017. Unknown CD4 count, viral load undetectable. In ED on 3/25, patient had sudden onset LLE weakness, NIHSS of 2, +drift, s/p tpa administration. CTA negative, not candidate for neuro intervention. Patient had persistent LLE weakness, and was being monitored on tele.   Rapid response called this morning for acute change in mental status, non responsiveness as well as changes noted on monitor by monitor tech (tachycardic to 120s), patient was noted to have agonal breathing, slumped over in bed and subsequently became hypoxic without improvement  Patient now s/p intubation by ED resident.   See RR sheet for full vitals  Physical Exam:  Gen: patient non responsive  CV: S1/S2+  Resp: unable to assess  abdomen: soft, ND    Patient started on propofol drip, neurology reconsulted for eval s/p ?seizure/post ictal state, given presentation.   Transfer to ICU

## 2020-03-31 NOTE — PROGRESS NOTE ADULT - SUBJECTIVE AND OBJECTIVE BOX
[facility]                                        Neurology at [place]                                 Jolanta Marte, & Rene                                  [address]                                        [place], [place], [address]                                             [phone]        CC: left leg weakness. New unresponsiveness.     HPI:   The patient is a 30y Male who presented with acute onset of left leg weakness with last know well at 0230 the morning of arrival. He has history of GBS in past with mild residual b/l leg weakness, but noted worsening left leg weakness the day of arrival and presented to the ED for evaluation.  He had an NIH SS=2 and was given alteplase for acute neurological symptoms with time of onset within 4.5 hours.  CT-A did not show large vessel occlusion and he was not a candidate for neuro-intervention.  His work up was completed and he was awaiting rehab.     Interim history:  He had been transferred to the ER CDU area when his prior unit was converted to a COVID-19 floor.   The morning of 3/31/2020 (today) he was noted to become tachycardic followed by bradycardic and unresponsive. Rapid Response was called and he was intubated and transferred to the critical care area. There was no tongue biting, incontinence, or witnessed convulsive activity. He was given Narcan in the ER with no improvement.   He was sedated and pharmacologically paralysed in the ER for intubation.     ROS:   Unobtainable due to patient's condition.     MEDICATIONS  (STANDING):  ALBUTerol    0.083%. 2.5 milliGRAM(s) Nebulizer once  aspirin enteric coated 81 milliGRAM(s) Oral daily  atorvastatin 20 milliGRAM(s) Oral at bedtime  etomidate Injectable 20 milliGRAM(s) IV Push once  heparin  Injectable 5000 Unit(s) SubCutaneous every 8 hours  levETIRAcetam  IVPB 1000 milliGRAM(s) IV Intermittent once  magnesium oxide 400 milliGRAM(s) Oral daily  propofol Infusion 5 MICROgram(s)/kG/Min (2.89 mL/Hr) IV Continuous <Continuous>  QUEtiapine 100 milliGRAM(s) Oral daily  QUEtiapine 200 milliGRAM(s) Oral at bedtime  succinylcholine Injectable 100 milliGRAM(s) IV Push once  tenofovir alafenamide 10 mG/anmgtxjtjjyl726 mG/cobicistat 150 mG/emtricitabine 200 mG (GENVOYA) 1 Tablet(s) Oral daily  veCURonium  Injectable 5 milliGRAM(s) IV Push once  veCURonium  Injectable 5 milliGRAM(s) IV Push once      Vital Signs Last 24 Hrs  T(C): 36.7 (31 Mar 2020 08:56), Max: 36.7 (31 Mar 2020 08:56)  T(F): 98.1 (31 Mar 2020 08:56), Max: 98.1 (31 Mar 2020 08:56)  HR: 97 (31 Mar 2020 08:56) (97 - 97)  BP: 115/72 (31 Mar 2020 08:56) (115/72 - 115/72)  RR: 18 (31 Mar 2020 08:56) (18 - 18)  SpO2: 97% (31 Mar 2020 08:56) (97% - 97%)    Detailed Neurologic Exam:    Mental status: Unresponsive to voice. On mechanical ventilator.     Cranial nerves: Pupils 2 mm and sluggish to react. There is no movement to oculocephalic maneuvers. There is no blink to threat from either side.   Face is grossly symmetric although an endotracheal tube is in place. Palate and tongue cannot be assessed.     Motor/Sensory: There is normal bulk and tone.  There is no tremor.  No motor response to noxious stimuli.     Reflexes: 2+ throughout and plantar responses are flexor.    Cerebellar: Cannot be assessed.     Labs:       Rad:   CT head done this am. Images reviewed. There is no acute change from prior.

## 2020-03-31 NOTE — CONSULT NOTE ADULT - SUBJECTIVE AND OBJECTIVE BOX
Pt is a 30M with a PMH of guillain barre who was admitted on 3/25 with stroke symptoms and was given tPA. Today he was found unconscious and was intubated to protect his airway.     25-Mar-2020 05:29	  25-Mar-2020 05:40	  25-Mar-2020 05:44	  No acute findings tpa administered 06:02	    NIHSS=2 (might be baseline) (25 Mar 2020 08:01)      PAST MEDICAL & SURGICAL HISTORY:  HIV (human immunodeficiency virus infection)  Asthma  Guillain-[place]  Guillain BarrÃ© syndrome  HIV (human immunodeficiency virus infection): from birth  No significant past surgical history  History of orthopedic surgery: left arm      Home Medications:  albuterol 90 mcg/inh inhalation aerosol: 2 puff(s) inhaled every 6 hours, As needed, Bronchospasm (29 Mar 2020 13:39)  Genvoya oral tablet: 1 tab(s) orally once a day (25 Mar 2020 07:55)  SEROquel 100 mg oral tablet: 100mg in the morning and 200mg at bedtime (25 Mar 2020 07:55)      Review of Systems: Unable to obtain 2/2 mental status    MEDICATIONS  (STANDING):  ALBUTerol    0.083%. 2.5 milliGRAM(s) Nebulizer once  aspirin Suppository 300 milliGRAM(s) Rectal daily  atorvastatin 40 milliGRAM(s) Oral at bedtime  chlorhexidine 0.12% Liquid 15 milliLiter(s) Oral Mucosa every 12 hours  enalaprilat Injectable 1.25 milliGRAM(s) IV Push every 6 hours  heparin  Injectable 5000 Unit(s) SubCutaneous every 8 hours  magnesium oxide 400 milliGRAM(s) Oral daily  propofol Infusion 5 MICROgram(s)/kG/Min (2.89 mL/Hr) IV Continuous <Continuous>  QUEtiapine 100 milliGRAM(s) Oral daily  QUEtiapine 200 milliGRAM(s) Oral at bedtime  tenofovir alafenamide 10 mG/piraewobincw053 mG/cobicistat 150 mG/emtricitabine 200 mG (GENVOYA) 1 Tablet(s) Oral daily    MEDICATIONS  (PRN):  ALBUTerol    90 MICROgram(s) HFA Inhaler 2 Puff(s) Inhalation every 6 hours PRN Bronchospasm      SOCIAL HISTORY:      Vital Signs Last 24 Hrs  T(C): 36.8 (31 Mar 2020 17:00), Max: 36.8 (31 Mar 2020 17:00)  T(F): 98.2 (31 Mar 2020 17:00), Max: 98.2 (31 Mar 2020 17:00)  HR: 123 (31 Mar 2020 18:00) (97 - 123)  BP: 118/56 (31 Mar 2020 18:00) (107/55 - 166/72)  BP(mean): 79 (31 Mar 2020 18:00) (79 - 104)  RR: 18 (31 Mar 2020 18:00) (17 - 34)  SpO2: 100% (31 Mar 2020 18:00) (97% - 100%)    Physical Exam:    General: NAD, intubated  HEENT: PERRL, EOMI  Neck: No JVD, FROM without pain  Pulm: Respirations non labored, no accessory muscle use  CVS: S1S2  Abdomen: Soft, NT/ND, without rebound or guarding  Neuro: Localizes to noxious stimulus      LABS:    03-31    133<L>  |  93<L>  |  19.0  ----------------------------<  116<H>  4.3   |  25.0  |  0.99    Ca    9.7      31 Mar 2020 16:44  Phos  5.3     03-31  Mg     1.3     03-31    TPro  7.6  /  Alb  3.9  /  TBili  0.4  /  DBili  x   /  AST  39  /  ALT  22  /  AlkPhos  61  03-31          Impression and Plan:  30M with altered mental status, etiology unclear.   -No reveal no obvious source of AMS  -Propofol held and promptly extubated without issue  -Will start diet tonight  -If no respiratory issues overnight, will transfer to the floors    Plan discussed with Dr. Ríos

## 2020-03-31 NOTE — PROGRESS NOTE ADULT - SUBJECTIVE AND OBJECTIVE BOX
CC: Sudden mental status changes with bradycardia and acute hypercapnic resp distress. Cocaine metabolite in urine likely cocaine intoxication. Emergently intubated in ED. HIV, history of Gullian [place] syndrome. Lower ext weakness.   HPI:  30M PMHx HIV, Guillain [place] since 2017 w/ residual b/l LE weakness Left >RIght, ambulates independently at baseline, but this time unable to ambulate at all since 8pm last night. Presents to ED c/o b/l leg weakness L>R. Patient states cannot feel legs; states tingling from toes to back of legs extending upward to back. States suddenly began while walking, unable to ambulate secondary to weakness. States feels similar to Guillain [place] flare but at that time included b/l LE weakness, last flare January 2017. Unknown CD4 count, viral load undetectable. Patient No further complaints at this time.     25-Mar-2020 05:29	  25-Mar-2020 05:40	  25-Mar-2020 05:44	  No acute findings tpa administered 06:02	    NIHSS=2 (might be baseline) (25 Mar 2020 08:01)    REVIEW OF SYSTEMS:    Patient denied fever, chills, abdominal pain, nausea, vomiting, cough, shortness of breath, chest pain or palpitations    Vital Signs Last 24 Hrs  T(C): 36.6 (31 Mar 2020 12:07), Max: 36.7 (31 Mar 2020 08:56)  T(F): 97.9 (31 Mar 2020 12:07), Max: 98.1 (31 Mar 2020 08:56)  HR: 117 (31 Mar 2020 16:00) (97 - 117)  BP: 166/72 (31 Mar 2020 16:00) (107/55 - 166/72)  BP(mean): 104 (31 Mar 2020 16:00) (94 - 104)  RR: 34 (31 Mar 2020 16:00) (17 - 34)  SpO2: 100% (31 Mar 2020 16:00) (97% - 100%)I&O's Summary    31 Mar 2020 07:01  -  31 Mar 2020 16:23  --------------------------------------------------------  IN: 46.2 mL / OUT: 80 mL / NET: -33.8 mL      PHYSICAL EXAM:  GENERAL: NAD,  HEENT: PERRL, +EOMI, anicteric, no [place]  NECK: Supple, No JVD   CHEST/LUNG: CTA bilaterally; Normal effort  HEART: S1S2 Normal intensity, no murmurs, gallops or rubs noted  ABDOMEN: Soft, BS Normoactive, NT, ND, no HSM noted  EXTREMITIES:  2+ radial and DP pulses noted, no clubbing, cyanosis, or edema noted,   SKIN: No rashes or lesions noted  NEURO: , Unresponsive on vent   PSYCH: Uresponsive non verbal   LABS:              RADIOLOGY & ADDITIONAL TESTS:    MEDICATIONS:  MEDICATIONS  (STANDING):  ALBUTerol    0.083%. 2.5 milliGRAM(s) Nebulizer once  aspirin enteric coated 81 milliGRAM(s) Oral daily  atorvastatin 20 milliGRAM(s) Oral at bedtime  calcium gluconate IVPB 2 Gram(s) IV Intermittent once  chlorhexidine 0.12% Liquid 15 milliLiter(s) Oral Mucosa every 12 hours  heparin  Injectable 5000 Unit(s) SubCutaneous every 8 hours  magnesium oxide 400 milliGRAM(s) Oral daily  propofol Infusion 5 MICROgram(s)/kG/Min (2.89 mL/Hr) IV Continuous <Continuous>  QUEtiapine 100 milliGRAM(s) Oral daily  QUEtiapine 200 milliGRAM(s) Oral at bedtime  tenofovir alafenamide 10 mG/tapnukwbljpd488 mG/cobicistat 150 mG/emtricitabine 200 mG (GENVOYA) 1 Tablet(s) Oral daily    MEDICATIONS  (PRN):  ALBUTerol    90 MICROgram(s) HFA Inhaler 2 Puff(s) Inhalation every 6 hours PRN Bronchospasm

## 2020-03-31 NOTE — CONSULT NOTE ADULT - ASSESSMENT
Pt is a 29 y/o male with medical history of HIV, Guillain [place] since 2017 w/ residual b/l LE weakness Left >RIght who presents to [facility]-ED with c/o bilateral leg weakness. As per ED Provider note "c/o b/l leg weakness L>R. Patient states cannot feel legs; states tingling from toes to back of legs extending upward to back. States suddenly began while walking, unable to ambulate secondary to weakness. States feels similar to Guillain [place] flare but at that time included b/l LE weakness, last flare January 2017. Unknown CD4 count, viral load undetectable. Pt became a RRT when significant SB noted on monitor with ST elevations. Pt was found to have agonal breathing and was intubated. When bedside ECG completed, no ST elevation found on ECG and ST elevation resolved on tele.       ST elevation on Tele  -ECG-ST HR @ 133  -Tele-ST HR @ 114  -Trop#1 negative  -ST elevation at time of agonal breathing likely d/t demand ischemia from hypoxia  -Pt currently intubated  -Pt COVID19 test pending      Guillain [place]  -CT Head- no acute intracranial pathology  - Pt is a 29 y/o male with medical history of HIV, Guillain [place] since 2017 w/ residual b/l LE weakness Left >RIght who presents to [facility]-ED with c/o bilateral leg weakness. As per ED Provider note "c/o b/l leg weakness L>R. Patient states cannot feel legs; states tingling from toes to back of legs extending upward to back. States suddenly began while walking, unable to ambulate secondary to weakness. States feels similar to Guillain [place] flare but at that time included b/l LE weakness, last flare January 2017. Unknown CD4 count, viral load undetectable. Pt became a RRT when significant SB noted on monitor with ST elevations. Pt was found to have agonal breathing and was intubated. When bedside ECG completed, no ST elevation found on ECG and ST elevation resolved on tele.       ST elevation on Tele  -ECG-ST HR @ 133  -Tele-ST HR @ 114  -Trop#1 negative  -Cont.  trend Trop  -ST elevation at time of agonal breathing likely d/t demand ischemia from hypoxia  -Pt currently intubated  -Echo-EF 60-65%, normal RV size and fx., mild LVH  -Pt COVID19 test pending Pt is a 31 y/o male with medical history of HIV, Guillain [place] since 2017 w/ residual b/l LE weakness Left >RIght who [facility] to [facility]ED with c/o bilateral leg weakness. As per ED Provider note "c/o b/l leg weakness L>R. Patient states cannot feel legs; states tingling from toes to back of legs extending upward to back. States suddenly began while walking, unable to ambulate secondary to weakness. States feels similar to Guillain [place] flare but at that time included b/l LE weakness, last flare January 2017. Unknown CD4 count, viral load undetectable. Pt became a RRT when significant SB noted on monitor with ST elevations. Pt was found to have agonal breathing and was intubated. When bedside ECG completed, no ST elevation found on ECG and ST elevation resolved on tele.       1. Abnormal EKG during respiratory distress.  -ECG-ST HR @ 133  -Tele-ST HR @ 114  -Trop#1 negative  -Cont.  trend Trop  -ST elevation at time of agonal breathing likely d/t demand ischemia from hypoxia  -Pt currently intubated  -Echo-EF 60-65%, normal RV size and fx., mild LVH  -Pt COVID19 test pending Pt is a 29 y/o male with medical history of HIV, Guillain [place] since 2017 w/ residual b/l LE weakness Left >RIght who presents to [facility]-ED with c/o bilateral leg weakness. As per ED Provider note "c/o b/l leg weakness L>R. Patient states cannot feel legs; states tingling from toes to back of legs extending upward to back. States suddenly began while walking, unable to ambulate secondary to weakness. States feels similar to Guillain [place] flare but at that time included b/l LE weakness, last flare January 2017. Unknown CD4 count, viral load undetectable. Pt became a RRT when significant SB noted on monitor with ST elevations. Pt was found to have agonal breathing and was intubated. When bedside ECG completed, no ST elevation found on ECG and ST elevation resolved on tele.       1. Abnormal EKG during respiratory distress.  -ECG-ST HR @ 133  -Tele-ST HR @ 114  -Trop#1 negative  -Cont.  trend Trop  -ST elevation. Bradycardia, tachycardia noted during respiratory distress, at time of agonal breathing likely d/t demand ischemia from hypoxia  -Pt currently intubated  -Echo-EF 60-65%, normal RV size and fx., mild LVH  -Pt COVID19 test pending

## 2020-03-31 NOTE — PROGRESS NOTE ADULT - ASSESSMENT
30 yr male admitted for lower ext weakness to rule out acute cva.    MRI show no acute cva.  History of Gullian Bare and residual lower ext weakness.     Problem :  Acute Mental status change with acute hypercarbic  resp failure.   Cocaine metabolite in urine drug screen concerning from likely  in-hospital use and intoxication  No ST elevation on EKG  No troponin elevation   Intubated . On vent.   Cardiology is following.  Serial trop.   rectal aspirin 325mg po daily   Atorvastatin   Enalapril 1.25mg q6 for BP control     Problem: Cerebrovascular accident (CVA), unspecified mechanism.  Plan: cva ruled out by MRI but has persistent leg weakness  For acute rehab placement as patient is homeless.     ·  Problem: Asymptomatic HIV infection.  Plan: continue hiv meds.     ·  Problem: Guillain BarrÃ© syndrome.  Plan: not likely the cause of current left leg weakness.     ·  Problem: Mild intermittent asthma without complication.  Plan: one nebulizer treatment  will give albuterol mdi.     Disp: Homeless.  For [facility] placement. PT

## 2020-03-31 NOTE — PHARMACOTHERAPY INTERVENTION NOTE - COMMENTS
Spoke with patient at bedside regarding medication list.  Please refer to outpatient medication review for the updated list.
RSI

## 2020-03-31 NOTE — PROGRESS NOTE ADULT - SUBJECTIVE AND OBJECTIVE BOX
Patient in stretcher. Reports he has no pain.   Continues to feel weak.   States that he does not feel ready to go home.  Has not been seen by therapy yesterday.  Have requested another treat today and daily to ensure DC to community.  CM aware and making [place] through DSS.    REVIEW OF SYSTEMS  Constitutional - No fever,  +fatigue  HEENT - No vertigo, No neck pain  Neurological - No headaches, No memory loss, +loss of strength, No numbness, No tremors  Skin - No rashes, No lesions   Musculoskeletal - No joint pain, No joint swelling, No muscle pain  Psychiatric - No depression, No anxiety    FUNCTIONAL PROGRESS  3/29  Bed Mobility  Bed Mobility Training Supine-to-Sit: minimum assist (75% patient effort)  Bed Mobility Training Limitations: impaired balance;  decreased strength;  decreased ROM;  decreased ability to use arms for pushing/pulling;  decreased ability to use legs for bridging/pushing    Sit-Stand Transfer Training  Transfer Training Sit-to-Stand Transfer: minimum assist (75% patient effort);  2 person assist;  full weight-bearing   rolling walker  Transfer Training Stand-to-Sit Transfer: minimum assist (75% patient effort);  moderate assist (50% patient effort);  2 person assist  Sit-to-Stand Transfer Training Transfer Safety Analysis: decreased balance;  decreased flexibility;  decreased ROM;  decreased strength;  impaired balance    Gait Training  Gait Training: minimum assist (75% patient effort);  moderate assist (50% patient effort);  2 person assist;  full weight-bearing   rolling walker;  5 feet  Gait Analysis: pt. with left knee buckling - educated on quad contraction with fair return;  decreased step length;  decreased stride length;  decreased toe clearance;  decreased strength;  impaired balance;  pt. attempting to squat x3 during amb dur to reports of urinary frequency. Assisted to chair for safety.      VITALS  T(C): 36.7 (03-31-20 @ 08:56), Max: 36.7 (03-31-20 @ 08:56)  HR: 97 (03-31-20 @ 08:56) (97 - 97)  BP: 115/72 (03-31-20 @ 08:56) (115/72 - 115/72)  RR: 18 (03-31-20 @ 08:56) (18 - 18)  SpO2: 97% (03-31-20 @ 08:56) (97% - 97%)  Wt(kg): --    MEDICATIONS   ALBUTerol    0.083%. 2.5 milliGRAM(s) once  ALBUTerol    90 MICROgram(s) HFA Inhaler 2 Puff(s) every 6 hours PRN  aspirin enteric coated 81 milliGRAM(s) daily  atorvastatin 20 milliGRAM(s) at bedtime  heparin  Injectable 5000 Unit(s) every 8 hours  magnesium oxide 400 milliGRAM(s) daily  QUEtiapine 100 milliGRAM(s) daily  QUEtiapine 200 milliGRAM(s) at bedtime  tenofovir alafenamide 10 mG/sfadoznfaxtb383 mG/cobicistat 150 mG/emtricitabine 200 mG (GENVOYA) 1 Tablet(s) daily      RECENT LABS - Reviewed                    ---------  PHYSICAL EXAM  Constitutional - NAD, Comfortable  Extremities - No C/C/E, No calf tenderness  Neurologic Exam -                    Cognitive - AAOx3     Communication - Dysfluency     Motor - No focal deficits     Sensory - Intact to LT  Psychiatric - Mood WNL, Affect WNL    ASSESSMENT/PLAN  30y Male with history of GBS with left leg weakness (?premorbid)  CVA PPX - ASA, Lipitor, No acute CVA on MRI  Mood - Seroquel  HIV - Genvoya  Pain - Tylenol  DVT PPX - SCDs, Heparin  Rehab - Patient is making progress to achieve functional goals for DC HOME with OUTPATIENT in 3-4 days.

## 2020-03-31 NOTE — PROGRESS NOTE ADULT - ASSESSMENT
30y Male who is followed by neurology because of left leg weakness    Left leg weakness.  Received Alteplase at 0602 3/25/2020  On ASA 81.  Repeat head CT  24 hours after t-PA administration did not show blood.  MRI brain did not show stroke.    New unresponsiveness after tachycardia followed by bradycardia.   Agree with Keppra as etiology unclear. Would continue 500 mg BID IV or via nasogastric tube.   Would recommend cardiology evaluation given preceding tachycardia followed by bradycardia.   Serial troponin.   Recommend urine toxicology.     Respiratory status.  Now on mechanical ventilator.   Will defer to medicine/ICU team regarding indication for COVID testing.     Case discussed with Dr Hernandez (ER attending), House staff covering Rapid Response team, and with SICU team and with Dr Chowdary.

## 2020-03-31 NOTE — CONSULT NOTE ADULT - ATTENDING COMMENTS
Patient seen and examined by me.  I have discussed my recommendation with the PA which are outlined above.  Will follow. Above note reviwed.  I have discussed my recommendation with the PA which are outlined above.  Will follow.

## 2020-04-01 DIAGNOSIS — R41.89 OTHER SYMPTOMS AND SIGNS INVOLVING COGNITIVE FUNCTIONS AND AWARENESS: ICD-10-CM

## 2020-04-01 LAB
ALBUMIN SERPL ELPH-MCNC: 3.4 G/DL — SIGNIFICANT CHANGE UP (ref 3.3–5.2)
ALP SERPL-CCNC: 50 U/L — SIGNIFICANT CHANGE UP (ref 40–120)
ALT FLD-CCNC: 16 U/L — SIGNIFICANT CHANGE UP
ANION GAP SERPL CALC-SCNC: 12 MMOL/L — SIGNIFICANT CHANGE UP (ref 5–17)
AST SERPL-CCNC: 27 U/L — SIGNIFICANT CHANGE UP
BILIRUB SERPL-MCNC: 0.4 MG/DL — SIGNIFICANT CHANGE UP (ref 0.4–2)
BUN SERPL-MCNC: 22 MG/DL — HIGH (ref 8–20)
CALCIUM SERPL-MCNC: 9.1 MG/DL — SIGNIFICANT CHANGE UP (ref 8.6–10.2)
CHLORIDE SERPL-SCNC: 98 MMOL/L — SIGNIFICANT CHANGE UP (ref 98–107)
CO2 SERPL-SCNC: 23 MMOL/L — SIGNIFICANT CHANGE UP (ref 22–29)
CREAT SERPL-MCNC: 1.27 MG/DL — SIGNIFICANT CHANGE UP (ref 0.5–1.3)
GLUCOSE SERPL-MCNC: 129 MG/DL — HIGH (ref 70–99)
HCT VFR BLD CALC: 35.1 % — LOW (ref 39–50)
HGB BLD-MCNC: 11.1 G/DL — LOW (ref 13–17)
MAGNESIUM SERPL-MCNC: 2.8 MG/DL — HIGH (ref 1.6–2.6)
MCHC RBC-ENTMCNC: 29.1 PG — SIGNIFICANT CHANGE UP (ref 27–34)
MCHC RBC-ENTMCNC: 31.6 GM/DL — LOW (ref 32–36)
MCV RBC AUTO: 91.9 FL — SIGNIFICANT CHANGE UP (ref 80–100)
PHOSPHATE SERPL-MCNC: 4.2 MG/DL — SIGNIFICANT CHANGE UP (ref 2.4–4.7)
PLATELET # BLD AUTO: 240 K/UL — SIGNIFICANT CHANGE UP (ref 150–400)
POTASSIUM SERPL-MCNC: 4.4 MMOL/L — SIGNIFICANT CHANGE UP (ref 3.5–5.3)
POTASSIUM SERPL-SCNC: 4.4 MMOL/L — SIGNIFICANT CHANGE UP (ref 3.5–5.3)
PROT SERPL-MCNC: 7.1 G/DL — SIGNIFICANT CHANGE UP (ref 6.6–8.7)
RBC # BLD: 3.82 M/UL — LOW (ref 4.2–5.8)
RBC # FLD: 11.9 % — SIGNIFICANT CHANGE UP (ref 10.3–14.5)
SODIUM SERPL-SCNC: 133 MMOL/L — LOW (ref 135–145)
TROPONIN T SERPL-MCNC: <0.01 NG/ML — SIGNIFICANT CHANGE UP (ref 0–0.06)
WBC # BLD: 11.08 K/UL — HIGH (ref 3.8–10.5)
WBC # FLD AUTO: 11.08 K/UL — HIGH (ref 3.8–10.5)

## 2020-04-01 PROCEDURE — 99232 SBSQ HOSP IP/OBS MODERATE 35: CPT

## 2020-04-01 PROCEDURE — 99233 SBSQ HOSP IP/OBS HIGH 50: CPT

## 2020-04-01 RX ORDER — BENZOCAINE AND MENTHOL 5; 1 G/100ML; G/100ML
1 LIQUID ORAL EVERY 4 HOURS
Refills: 0 | Status: DISCONTINUED | OUTPATIENT
Start: 2020-04-01 | End: 2020-04-03

## 2020-04-01 RX ORDER — SODIUM CHLORIDE 9 MG/ML
1000 INJECTION INTRAMUSCULAR; INTRAVENOUS; SUBCUTANEOUS ONCE
Refills: 0 | Status: COMPLETED | OUTPATIENT
Start: 2020-04-01 | End: 2020-04-01

## 2020-04-01 RX ORDER — SODIUM CHLORIDE 9 MG/ML
1000 INJECTION, SOLUTION INTRAVENOUS ONCE
Refills: 0 | Status: COMPLETED | OUTPATIENT
Start: 2020-04-01 | End: 2020-04-01

## 2020-04-01 RX ADMIN — Medication 1.25 MILLIGRAM(S): at 11:46

## 2020-04-01 RX ADMIN — HEPARIN SODIUM 5000 UNIT(S): 5000 INJECTION INTRAVENOUS; SUBCUTANEOUS at 15:19

## 2020-04-01 RX ADMIN — QUETIAPINE FUMARATE 100 MILLIGRAM(S): 200 TABLET, FILM COATED ORAL at 11:45

## 2020-04-01 RX ADMIN — BENZOCAINE AND MENTHOL 1 LOZENGE: 5; 1 LIQUID ORAL at 11:45

## 2020-04-01 RX ADMIN — Medication 50 GRAM(S): at 00:01

## 2020-04-01 RX ADMIN — SODIUM CHLORIDE 1000 MILLILITER(S): 9 INJECTION, SOLUTION INTRAVENOUS at 12:03

## 2020-04-01 RX ADMIN — QUETIAPINE FUMARATE 200 MILLIGRAM(S): 200 TABLET, FILM COATED ORAL at 23:50

## 2020-04-01 RX ADMIN — HEPARIN SODIUM 5000 UNIT(S): 5000 INJECTION INTRAVENOUS; SUBCUTANEOUS at 05:58

## 2020-04-01 RX ADMIN — ATORVASTATIN CALCIUM 40 MILLIGRAM(S): 80 TABLET, FILM COATED ORAL at 23:50

## 2020-04-01 RX ADMIN — SODIUM CHLORIDE 1000 MILLILITER(S): 9 INJECTION INTRAMUSCULAR; INTRAVENOUS; SUBCUTANEOUS at 05:55

## 2020-04-01 RX ADMIN — Medication 1.25 MILLIGRAM(S): at 05:58

## 2020-04-01 RX ADMIN — ELVITEGRAVIR, COBICISTAT, EMTRICITABINE, AND TENOFOVIR ALAFENAMIDE 1 TABLET(S): 150; 150; 200; 10 TABLET ORAL at 11:45

## 2020-04-01 NOTE — OCCUPATIONAL THERAPY INITIAL EVALUATION ADULT - PRECAUTIONS/LIMITATIONS, REHAB EVAL
cardiac precautions/fall precautions
supervision with meals; head of bed 30 degrees/seizure precautions/fall precautions/aspiration precautions

## 2020-04-01 NOTE — CHART NOTE - NSCHARTNOTEFT_GEN_A_CORE
SICU TRANSFER NOTE  -----------------------------  ICU Admission Date: XXXXX  Transfer Date: 04-01-20 @ 16:07    Admission Diagnosis: respiratory failure in the setting of altered mental status     Active Problems/injuries: s/p CVA 3/25, LOC 3/31 requiring intubation for airway protection, extubated later in the day 3/31 w/o complication    Consultants:  [ ] Cardiology  [ ] Endocrine  [ ] Infectious Disease  [ ] Medicine  [ ]Neurosurgery  [ ] Ortho       [ ] Weight Bearing Status:  [ ] Palliative       [ ] Advanced Directives:    [ ] Physical Medicine and Rehab       [ ] Disposition :   [ ] Plastics  [ ] Pulmonary    Medications  ALBUTerol    90 MICROgram(s) HFA Inhaler 2 Puff(s) Inhalation every 6 hours PRN  atorvastatin 40 milliGRAM(s) Oral at bedtime  benzocaine 15 mG/menthol 3.6 mG (Sugar-Free) Lozenge 1 Lozenge Oral every 4 hours PRN  chlorhexidine 2% Cloths 1 Application(s) Topical daily  heparin  Injectable 5000 Unit(s) SubCutaneous every 8 hours  QUEtiapine 100 milliGRAM(s) Oral daily  QUEtiapine 200 milliGRAM(s) Oral at bedtime  tenofovir alafenamide 10 mG/sbctrofqpfds770 mG/cobicistat 150 mG/emtricitabine 200 mG (GENVOYA) 1 Tablet(s) Oral daily      [x] I attest I have reviewed and reconciled all medications prior to transfer    IV Fluids  sodium chloride 0.9%.: Solution, 1000 milliLiter(s) infuse at 75 mL/Hr  Provider's Contact #: [phone]  sodium chloride 0.9% Bolus:   1000 milliLiter(s), IV Bolus, once, infuse over 60 Minute(s), Stop After 1 Doses  Provider's Contact #: [phone]  sodium chloride 0.9% Bolus:   1000 milliLiter(s), IV Bolus, once, infuse over 60 Minute(s), Stop After 1 Doses  Provider's Contact #: [phone]    Antibiotics:  tenofovir alafenamide 10 mG/iwtwrclttltz449 mG/cobicistat 150 mG/emtricitabine 200 mG (GENVOYA) 1 Tablet(s) Oral daily    Indication: HIV End Date: N/A    Hospitalist was contacted for transfer, pending call back  The following items are to be followed up:  - physical therapy   - elevated HR this AM, improved with fluid bolus

## 2020-04-01 NOTE — PROGRESS NOTE ADULT - ASSESSMENT
30M PMHx HIV, Guillain [place] since 2017 w/ residual b/l LE weakness Left >RIght, ambulates independently at baseline. seen in ER 3/24--per triage note unresponsive s/p narcan  states used marijuana.  Received TPA for suspected CVA. MRI w/o acute cva., course complicated by acute unresponsive episode s/p intubation/extubation. utox positive for cocaine/marijuana.     acute encephalopathy: suspect due to drug use.   resolved, monitor    left leg weakness: CVA ruled out    s/p tPA    c/w aspirin/statin    HIV: home meds    ppx: heparin.      Sw/CM aware  dc once cleared by PT/OT/Pm&R

## 2020-04-01 NOTE — OCCUPATIONAL THERAPY INITIAL EVALUATION ADULT - ADDITIONAL COMMENTS
Pt has tub with curtain  Pt does not own any DME  Pt is right handed
As per pt, pt lives in house with 5 STACEY and 5 steps down inside to bedroom and bathroom. Bathroom has bathtub with curtains. Pt does not own any DME. Pt is right handed. Pt drives. Pt's friends are able to assist upon discharge. Upon discussion with SW, pt lives in shelter and this description given is his mother's home. Pt recently released from incarceration.

## 2020-04-01 NOTE — OCCUPATIONAL THERAPY INITIAL EVALUATION ADULT - GENERAL OBSERVATIONS, REHAB EVAL
Received pt in semi-mcmahon position in bed, +IV locks, +telemetry, +VCBs. Pt agrees to OT.
+IV, +cardiac monitor

## 2020-04-01 NOTE — PROGRESS NOTE ADULT - SUBJECTIVE AND OBJECTIVE BOX
[place] [facility]                                                               Office: [address] [address]                                                              Telephone: [phone]. Fax:[phone]                                                                             PROGRESS NOTE  Reason for follow up: acute changes on tele  Overnight: pt extubated  Update: Patient doing well s/p extubation. Denies cardiac complaints. O2 sat 96% on RA    Subjective: "I have a sore throat"      Complains of:     Review of symptoms:   Cardiac:  No chest pain. No dyspnea. No palpitations.  Respiratory: No cough. No dyspnea  Gastrointestinal: No diarrhea. No abdominal pain. No bleeding.     Past medical history: No updates.   	  Vitals:  T(C): 36.4 (04-01-20 @ 12:01), Max: 37.6 (03-31-20 @ 23:51)  HR: 114 (04-01-20 @ 11:44) (92 - 123)  BP: 143/62 (04-01-20 @ 11:44) (101/54 - 166/72)  RR: 27 (04-01-20 @ 11:44) (15 - 34)  SpO2: 93% (04-01-20 @ 11:44) (93% - 100%)  Wt(kg): --  I&O's Summary    31 Mar 2020 07:01  -  01 Apr 2020 07:00  --------------------------------------------------------  IN: 2296.2 mL / OUT: 1100 mL / NET: 1196.2 mL            PHYSICAL EXAM:  Appearance: Comfortable. No acute distress  HEENT:  Head and neck: Atraumatic. Normocephalic.  Normal oral mucosa, PERRL, Neck is supple. No JVD, No carotid bruit.   Neurologic: A&Ox 3, no focal deficits. EOMI, Cranial nerves are intact.  Lymphatic: No cervical lymphadenopathy  Cardiovascular: Normal S1 S2, No murmur, rubs/gallops. No JVD, No edema  Respiratory: Lungs clear to auscultation  Gastrointestinal:  Soft, Non-tender, + BS  Lower Extremities: No edema  Psychiatry: Patient is calm. No agitation. Mood & affect appropriate  Skin: No rashes/ecchymoses/cyanosis/ulcers visualized on the face, hands or feet.      CURRENT MEDICATIONS:  enalaprilat Injectable 1.25 milliGRAM(s) IV Push every 6 hours    tenofovir alafenamide 10 mG/qovcltqtlsuy680 mG/cobicistat 150 mG/emtricitabine 200 mG (GENVOYA)  QUEtiapine  QUEtiapine  atorvastatin  chlorhexidine 2% Cloths  heparin  Injectable      DIAGNOSTIC TESTING:  [ ] Echocardiogram:   < from: TTE Echo Complete w/ Contrast w/ Doppler (03.25.20 @ 10:51) >  Summary:   1. Mobile intra-atrial septum.   2. Intravenous injection of agitated saline demonstrates the presence of a patent foramen ovale.   3. There is mild concentric left ventricular hypertrophy.   4. Left ventricular ejection fraction, by visual estimation, is 65 to 70%.   5. Normal right ventricular size and function.   6. Mild mitral valve regurgitation.   7. There is no evidence of pericardial effusion.    MD Ricardo, RPVI Electronically signed on 3/25/2020 at 5:12:41 PM    < end of copied text >    [ ]  Catheterization:  [ ] Stress Test:    OTHER: 	      LABS:	 	  CARDIAC MARKERS ( 01 Apr 2020 04:21 )  x     / <0.01 ng/mL / x     / x     / x      p-BNP 01 Apr 2020 04:21: x    , CARDIAC MARKERS ( 31 Mar 2020 20:57 )  x     / <0.01 ng/mL / 456 U/L / x     / 2.9 ng/mL  p-BNP 31 Mar 2020 20:57: x    , CARDIAC MARKERS ( 31 Mar 2020 16:44 )  x     / <0.01 ng/mL / x     / x     / x      p-BNP 31 Mar 2020 16:44: x    , CARDIAC MARKERS ( 31 Mar 2020 12:15 )  x     / <0.01 ng/mL / x     / x     / x      p-BNP 31 Mar 2020 12:15: x                              11.1   11.08 )-----------( 240      ( 01 Apr 2020 04:21 )             35.1     04-01    133<L>  |  98  |  22.0<H>  ----------------------------<  129<H>  4.4   |  23.0  |  1.27    Ca    9.1      01 Apr 2020 04:21  Phos  4.2     04-01  Mg     2.8     04-01    TPro  7.1  /  Alb  3.4  /  TBili  0.4  /  DBili  x   /  AST  27  /  ALT  16  /  AlkPhos  50  04-01    proBNP:   Lipid Profile: Date: 03-25 @ 20:44  Total cholesterol 134; Direct LDL: 74; HDL: 33; Triglycerides:136    HgA1c: Hemoglobin A1C, Whole Blood: 4.6 %    TSH:       TELEMETRY: Sinus tachycardia 110s

## 2020-04-01 NOTE — OCCUPATIONAL THERAPY INITIAL EVALUATION ADULT - MUSCLE TONE ASSESSMENT, REHAB EVAL
bilateral upper extremities/mildly decreased tone
bilateral UEs normal; bilateral LEs with inconsistent findings as pt with increased tone upon assessment however not present with functional tasks or movement

## 2020-04-01 NOTE — DIETITIAN INITIAL EVALUATION ADULT. - OTHER INFO
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 Presents to ED c/o b/l leg weakness L>R. Patient states cannot feel legs; states tingling from toes to back of legs extending upward to back. States suddenly began while walking, unable to ambulate secondary to weakness. States feels similar to Guillain [place] flare but at that time included b/l LE weakness.  Patient was unresponsive yesterday and was intubated likely 2/2 to cocaine ingestion, came to the ICU and was able to get extubated with no issues. Patient has no complaints at this time, left lower extremity weakness remains stable from admission.  Pt tolerating Regular diet at this time, unable to interview or obtain wt hx a this time.

## 2020-04-01 NOTE — PROGRESS NOTE ADULT - ASSESSMENT
30y Male who is followed by neurology because of left leg weakness    Left leg weakness.  Received Alteplase at 0602 3/25/2020  On ASA 81.  Repeat head CT  24 hours after t-PA administration did not show blood.  MRI brain did not show stroke.    On 3/31/ had episode of unresponsiveness after tachycardia followed by bradycardia.   possible cocaine ingestion in hospital (+) tox screen  Will d/c keppra  Appreciate cardiology evaluation given preceding tachycardia followed by bradycardia.         Respiratory status.  Extubated now  Will defer to medicine/ICU team regarding indication for COVID testing.     Neuro stable, no further work up needed at this time  OK for BRAYDEN    discussed with Dr Marr    Thank you for allowing me to participate in the care of your patient    Karir Stover MD, PhD   [id number]

## 2020-04-01 NOTE — PROGRESS NOTE ADULT - PROBLEM SELECTOR PROBLEM 1
Unresponsive
Cerebrovascular accident (CVA), unspecified mechanism
Cerebrovascular accident (CVA), unspecified mechanism

## 2020-04-01 NOTE — PROGRESS NOTE ADULT - ASSESSMENT
A/P:  31 y/o male with medical history of HIV, Guillain [place] since 2017 w/ residual b/l LE weakness Left >RIght who presents to [facility]-ED with c/o bilateral leg weakness. As per ED Provider note " c/o b/l leg weakness L>R. Patient states cannot feel legs; states tingling from toes to back of legs extending upward to back. States suddenly began while walking, unable to ambulate secondary to weakness. States feels similar to Guillain [place] flare but at that time included b/l LE weakness, last flare January 2017. Unknown CD4 count, viral load undetectable. Pt became a RRT when significant SB noted on monitor with ST elevations. Pt was found to have agonal breathing and was intubated. When bedside ECG completed, no ST elevation found on ECG and ST elevation resolved on tele.       1. Abnormal EKG during respiratory distress.  -Tele-ST 110s  -Trop negative x4  -ST elevation. Bradycardia, tachycardia noted during respiratory distress, at time of agonal breathing likely d/t demand ischemia from hypoxia  -Echo-EF 60-65%, normal RV size and fx., mild LVH  -COVID19 +  -no further cardiac w/u at this time, patient may f/u as outpatient    Thank you for allowing me to participate in care of your patient.   Please call as needed.     Preliminary evaluation, please await complete evaluation by Dr. Martinez A/P:  29 y/o male with medical history of HIV, Guillain [place] since 2017 w/ residual b/l LE weakness Left >RIght who presents to [facility]-ED with c/o bilateral leg weakness. As per ED Provider note " c/o b/l leg weakness L>R. Patient states cannot feel legs; states tingling from toes to back of legs extending upward to back. States suddenly began while walking, unable to ambulate secondary to weakness. States feels similar to Guillain [place] flare but at that time included b/l LE weakness, last flare January 2017. Unknown CD4 count, viral load undetectable. Pt became a RRT when significant SB noted on monitor with ST elevations. Pt was found to have agonal breathing and was intubated. When bedside ECG completed, no ST elevation found on ECG and ST elevation resolved on tele.       1. Abnormal EKG during respiratory distress.  -Tele-ST 110s  -Trop negative x4  -ST elevation. Bradycardia, tachycardia noted during respiratory distress, at time of agonal breathing likely d/t demand ischemia from hypoxia  -Echo-EF 60-65%, normal RV size and fx., mild LVH  -COVID19 +  -no further cardiac w/u at this time, patient may f/u as outpatient    Thank you for allowing me to participate in care of your patient.   Please call as needed. A/P:  31 y/o male with medical history of HIV, Guillain [place] since 2017 w/ residual b/l LE weakness Left >RIght who presents to [facility]-ED with c/o bilateral leg weakness. As per ED Provider note " c/o b/l leg weakness L>R. Patient states cannot feel legs; states tingling from toes to back of legs extending upward to back. States suddenly began while walking, unable to ambulate secondary to weakness. States feels similar to Guillain [place] flare but at that time included b/l LE weakness, last flare January 2017. Unknown CD4 count, viral load undetectable. Pt became a RRT when significant SB noted on monitor with ST elevations. Pt was found to have agonal breathing and was intubated. When bedside ECG completed, no ST elevation found on ECG and ST elevation resolved on tele.       1. Abnormal EKG during respiratory distress.  -Tele-ST 110s  -Trop negative x4  -ST elevation. Bradycardia, tachycardia noted during respiratory distress, at time of agonal breathing likely d/t demand ischemia from hypoxia  -Echo-EF 60-65%, normal RV size and fx., mild LVH  -no further cardiac w/u at this time, patient may f/u as outpatient    Thank you for allowing me to participate in care of your patient.   Please call as needed.

## 2020-04-01 NOTE — PROGRESS NOTE ADULT - SUBJECTIVE AND OBJECTIVE BOX
Patient in bed, reports fatigue.  Will have OT and PT assessments today for goals for DC back to community, pending CM assist with [place] to DSS.  Reports no pain.     REVIEW OF SYSTEMS  Constitutional - No fever,  +fatigue  HEENT - No vertigo, No neck pain  Neurological - No headaches, No memory loss, +loss of strength, No numbness, No tremors  Skin - No rashes, No lesions   Musculoskeletal - No joint pain, No joint swelling, No muscle pain  Psychiatric - No depression, No anxiety    FUNCTIONAL PROGRESS  3/29  Bed Mobility  Bed Mobility Training Supine-to-Sit: minimum assist (75% patient effort)  Bed Mobility Training Limitations: impaired balance;  decreased strength;  decreased ROM;  decreased ability to use arms for pushing/pulling;  decreased ability to use legs for bridging/pushing    Sit-Stand Transfer Training  Transfer Training Sit-to-Stand Transfer: minimum assist (75% patient effort);  2 person assist;  full weight-bearing   rolling walker  Transfer Training Stand-to-Sit Transfer: minimum assist (75% patient effort);  moderate assist (50% patient effort);  2 person assist  Sit-to-Stand Transfer Training Transfer Safety Analysis: decreased balance;  decreased flexibility;  decreased ROM;  decreased strength;  impaired balance    Gait Training  Gait Training: minimum assist (75% patient effort);  moderate assist (50% patient effort);  2 person assist;  full weight-bearing   rolling walker;  5 feet  Gait Analysis: pt. with left knee buckling - educated on quad contraction with fair return;  decreased step length;  decreased stride length;  decreased toe clearance;  decreased strength;  impaired balance;  pt. attempting to squat x3 during amb dur to reports of urinary frequency. Assisted to chair for safety.    VITALS  T(C): 37.4 (04-01-20 @ 03:24), Max: 37.6 (03-31-20 @ 23:51)  HR: 121 (04-01-20 @ 08:00) (92 - 123)  BP: 111/57 (04-01-20 @ 08:00) (101/54 - 166/72)  RR: 27 (04-01-20 @ 08:00) (15 - 34)  SpO2: 93% (04-01-20 @ 08:00) (93% - 100%)  Wt(kg): --    MEDICATIONS   ALBUTerol    90 MICROgram(s) HFA Inhaler 2 Puff(s) every 6 hours PRN  atorvastatin 40 milliGRAM(s) at bedtime  benzocaine 15 mG/menthol 3.6 mG (Sugar-Free) Lozenge 1 Lozenge every 4 hours PRN  chlorhexidine 2% Cloths 1 Application(s) daily  enalaprilat Injectable 1.25 milliGRAM(s) every 6 hours  heparin  Injectable 5000 Unit(s) every 8 hours  QUEtiapine 100 milliGRAM(s) daily  QUEtiapine 200 milliGRAM(s) at bedtime  tenofovir alafenamide 10 mG/gilrbkvaxlnw036 mG/cobicistat 150 mG/emtricitabine 200 mG (GENVOYA) 1 Tablet(s) daily      RECENT LABS - Reviewed                        11.1   11.08 )-----------( 240      ( 01 Apr 2020 04:21 )             35.1     04-01    133<L>  |  98  |  22.0<H>  ----------------------------<  129<H>  4.4   |  23.0  |  1.27    Ca    9.1      01 Apr 2020 04:21  Phos  4.2     04-01  Mg     2.8     04-01    TPro  7.1  /  Alb  3.4  /  TBili  0.4  /  DBili  x   /  AST  27  /  ALT  16  /  AlkPhos  50  04-01                ---------  PHYSICAL EXAM  Constitutional - NAD, Comfortable  Extremities - No C/C/E, No calf tenderness  Neurologic Exam -                    Cognitive - AAOx3     Communication - Dysfluency     Motor - No focal deficits     Sensory - Intact to LT  Psychiatric - Mood WNL, Affect WNL    ASSESSMENT/PLAN  30y Male with history of GBS with left leg weakness (?premorbid)  CVA PPX - ASA, Lipitor, No acute CVA on MRI  HTN - Vasotec  Pulm - Proventil   Mood - Seroquel  HIV - Genvoya  Pain - Tylenol  DVT PPX - SCDs, Heparin  Rehab - Patient is making progress to achieve functional goals for DC HOME with OUTPATIENT in 3-4 days.

## 2020-04-01 NOTE — OCCUPATIONAL THERAPY INITIAL EVALUATION ADULT - PLANNED THERAPY INTERVENTIONS, OT EVAL
ADL retraining/balance training/bed mobility training
strengthening/cognitive, visual perceptual/fine motor coordination training/neuromuscular re-education/transfer training/ADL retraining/balance training/bed mobility training/motor coordination training

## 2020-04-01 NOTE — PROGRESS NOTE ADULT - PROBLEM SELECTOR PLAN 1
Patient with history of duffy chris, and CVA was unresponsive in ED, intubated and brought to the ICU, able to be extubated with no issues, neurologically and hemodynamically normal, tolerating a diet, motor deficits stable from admission, will discontinue gonzales this am and give TOV, can be downgraded off the unit, no critical care needs
cva ruled out by MRI but has persistent leg weakness  plan is placement to acute rehab if patient refuses is homeless so will need  for shelter placement
cva ruled out by MRI but has persistent leg weakness

## 2020-04-01 NOTE — OCCUPATIONAL THERAPY INITIAL EVALUATION ADULT - PERTINENT HX OF CURRENT PROBLEM, REHAB EVAL
Pt provided with tpa; +cocaine, +opiate; pt has a history of GBS
Pt with history of Guillain [place] Syndrome (January 2017) and HIV (from birth).

## 2020-04-01 NOTE — OCCUPATIONAL THERAPY INITIAL EVALUATION ADULT - LEVEL OF INDEPENDENCE: SUPINE/SIT, REHAB EVAL
moderate assist (50% patients effort)/maximum assist (25% patients effort)
moderate assist (50% patients effort)

## 2020-04-01 NOTE — DIETITIAN INITIAL EVALUATION ADULT. - PERTINENT LABORATORY DATA
04-01 Na133 mmol/L<L> Glu 129 mg/dL<H> K+ 4.4 mmol/L Cr  1.27 mg/dL BUN 22.0 mg/dL<H> Phos 4.2 mg/dL Alb 3.4 g/dL PAB n/a

## 2020-04-01 NOTE — PROGRESS NOTE ADULT - SUBJECTIVE AND OBJECTIVE BOX
seen for weakness, unresponsiveness    ICU transfer    complaining of chronic LLE weakness  ros negative  denies drug use.    MEDICATIONS  (STANDING):  atorvastatin 40 milliGRAM(s) Oral at bedtime  chlorhexidine 2% Cloths 1 Application(s) Topical daily  heparin  Injectable 5000 Unit(s) SubCutaneous every 8 hours  QUEtiapine 100 milliGRAM(s) Oral daily  QUEtiapine 200 milliGRAM(s) Oral at bedtime  tenofovir alafenamide 10 mG/fzgdvppfzcht011 mG/cobicistat 150 mG/emtricitabine 200 mG (GENVOYA) 1 Tablet(s) Oral daily    MEDICATIONS  (PRN):  ALBUTerol    90 MICROgram(s) HFA Inhaler 2 Puff(s) Inhalation every 6 hours PRN Bronchospasm  benzocaine 15 mG/menthol 3.6 mG (Sugar-Free) Lozenge 1 Lozenge Oral every 4 hours PRN Sore Throat      Allergies    Ceclor (Unknown)      Vital Signs Last 24 Hrs  T(C): 36.4 (01 Apr 2020 12:01), Max: 37.6 (31 Mar 2020 23:51)  T(F): 97.6 (01 Apr 2020 12:01), Max: 99.6 (31 Mar 2020 23:51)  HR: 114 (01 Apr 2020 11:44) (92 - 123)  BP: 143/62 (01 Apr 2020 11:44) (101/54 - 166/72)  BP(mean): 86 (01 Apr 2020 11:44) (75 - 104)  RR: 27 (01 Apr 2020 11:44) (15 - 34)  SpO2: 93% (01 Apr 2020 11:44) (93% - 100%)    PHYSICAL EXAM:    GENERAL: NAD  CHEST/LUNG: Clear to percussion bilaterally  HEART: Regular rate and rhythm; S1 S2  ABDOMEN: Soft, Nondistended; Bowel sounds present  EXTREMITIES:  no edema   NERVOUS SYSTEM:  Alert & Oriented X3 3/5 LLE  5/5 x3     LABS:                        11.1   11.08 )-----------( 240      ( 01 Apr 2020 04:21 )             35.1     04-01    133<L>  |  98  |  22.0<H>  ----------------------------<  129<H>  4.4   |  23.0  |  1.27    Ca    9.1      01 Apr 2020 04:21  Phos  4.2     04-01  Mg     2.8     04-01    TPro  7.1  /  Alb  3.4  /  TBili  0.4  /  DBili  x   /  AST  27  /  ALT  16  /  AlkPhos  50  04-01          CAPILLARY BLOOD GLUCOSE            RADIOLOGY & ADDITIONAL TESTS:

## 2020-04-01 NOTE — OCCUPATIONAL THERAPY INITIAL EVALUATION ADULT - REFERRAL TO ANOTHER SERVICE NEEDED, OT EVAL
physical therapy/speech language pathology/social work
speech language pathology/physical therapy/social work

## 2020-04-01 NOTE — PROGRESS NOTE ADULT - SUBJECTIVE AND OBJECTIVE BOX
[facility]                                        Neurology at [place]                                 Jolanta Marte, & Rene                                  [address]                                        [place], [place], [address]                                             [phone]        CC: left leg weakness. New unresponsiveness.     HPI:   The patient is a 30y Male who presented with acute onset of left leg weakness with last know well at 0230 the morning of arrival. He has history of GBS in past with mild residual b/l leg weakness, but noted worsening left leg weakness the day of arrival and presented to the ED for evaluation.  He had an NIH SS=2 and was given alteplase for acute neurological symptoms with time of onset within 4.5 hours.  CT-A did not show large vessel occlusion and he was not a candidate for neuro-intervention.  His work up was completed and he was awaiting rehab.     Interim history:  He is now extubated, and awake and alert.  His Tox screen was (+) cocaine, when asked he denied using cocaine for many years    ROS neurology: Denies headache or dizziness. (+) left leg weakness.  No numbness.  Denies speech/language deficits. Denies diplopia/blurred vision.  Denies confusion    MEDICATIONS  (STANDING):  atorvastatin 40 milliGRAM(s) Oral at bedtime  chlorhexidine 2% Cloths 1 Application(s) Topical daily  enalaprilat Injectable 1.25 milliGRAM(s) IV Push every 6 hours  heparin  Injectable 5000 Unit(s) SubCutaneous every 8 hours  QUEtiapine 100 milliGRAM(s) Oral daily  QUEtiapine 200 milliGRAM(s) Oral at bedtime  tenofovir alafenamide 10 mG/sfbxsdoskrrk152 mG/cobicistat 150 mG/emtricitabine 200 mG (GENVOYA) 1 Tablet(s) Oral daily    MEDICATIONS  (PRN):  ALBUTerol    90 MICROgram(s) HFA Inhaler 2 Puff(s) Inhalation every 6 hours PRN Bronchospasm  benzocaine 15 mG/menthol 3.6 mG (Sugar-Free) Lozenge 1 Lozenge Oral every 4 hours PRN Sore Throat      Vital Signs Last 24 Hrs  T(C): 36.4 (01 Apr 2020 12:01), Max: 37.6 (31 Mar 2020 23:51)  T(F): 97.6 (01 Apr 2020 12:01), Max: 99.6 (31 Mar 2020 23:51)  HR: 114 (01 Apr 2020 11:44) (92 - 123)  BP: 143/62 (01 Apr 2020 11:44) (101/54 - 166/72)  BP(mean): 86 (01 Apr 2020 11:44) (75 - 104)  RR: 27 (01 Apr 2020 11:44) (15 - 34)  SpO2: 93% (01 Apr 2020 11:44) (93% - 100%)    Detailed Neurologic Exam:    Mental status: The patient is awake and alert and has normal attention span.  The patient is fully oriented.  There is no aphasia.    Cranial nerves: Pupils equal and react symmetrically to light. There is no visual field deficit to confrontation. Extraocular motion is full with no nystagmus. There is no ptosis. Facial sensation is intact. Facial musculature is symmetric. Palate elevates symmetrically.  Tongue is midline.    Motor: There is normal bulk and tone.  There is no tremor.  Strength is 5/5 in the right arm and leg.   Strength is 5/5 in the left arm and 3/5 left leg.    Sensation: Intact to light touch and pin in 4 extremities    Reflexes:  1+ patella b/l     Cerebellar: There is no dysmetria on finger to nose testing.    Gait : deferred    Labs:                           11.1   11.08 )-----------( 240      ( 01 Apr 2020 04:21 )             35.1     04-01    133<L>  |  98  |  22.0<H>  ----------------------------<  129<H>  4.4   |  23.0  |  1.27    Ca    9.1      01 Apr 2020 04:21  Phos  4.2     04-01  Mg     2.8     04-01    TPro  7.1  /  Alb  3.4  /  TBili  0.4  /  DBili  x   /  AST  27  /  ALT  16  /  AlkPhos  50  04-01    LIVER FUNCTIONS - ( 01 Apr 2020 04:21 )  Alb: 3.4 g/dL / Pro: 7.1 g/dL / ALK PHOS: 50 U/L / ALT: 16 U/L / AST: 27 U/L / GGT: x             Rad:   CT head 3/31/2020- no acute CVA or blood

## 2020-04-01 NOTE — OCCUPATIONAL THERAPY INITIAL EVALUATION ADULT - LIVES WITH, PROFILE
pt is a resident of a shelter; pt reports that he may be able to stay with his mother upon discharge who lives in a private house with 5 steps to enter and 5 steps inside
roommates

## 2020-04-01 NOTE — OCCUPATIONAL THERAPY INITIAL EVALUATION ADULT - DIAGNOSIS, OT EVAL
s/p CVA
Pt presents to ED with c/o bilateral LE weakness (left>right). Pt received tPA. Head MRI with no diffusion restriction or MR evidence of acute ischemia; periventricular white matter findings raise the possibility of an underlying inflammatory etiology and/or demyelinating disease; further workup and assessment recommended; mucosal thickening noted in the sinuses; mastoids are clear.

## 2020-04-01 NOTE — PROGRESS NOTE ADULT - SUBJECTIVE AND OBJECTIVE BOX
24 HOUR EVENTS: Patient was unresponsive yesterday and was intubated likely 2/2 to cocaine ingestion, came to the ICU and was able to get extubated with no issues. Patient has no complaints at this time, left lower extremity weakness remains stable from admission. Was tachycardiac to the 113's and hypotensive to the low 90's, given a liter bolus and responded appropriately       MEDICATIONS  (STANDING):  atorvastatin 40 milliGRAM(s) Oral at bedtime  chlorhexidine 2% Cloths 1 Application(s) Topical daily  enalaprilat Injectable 1.25 milliGRAM(s) IV Push every 6 hours  heparin  Injectable 5000 Unit(s) SubCutaneous every 8 hours  magnesium oxide 400 milliGRAM(s) Oral daily  QUEtiapine 100 milliGRAM(s) Oral daily  QUEtiapine 200 milliGRAM(s) Oral at bedtime  tenofovir alafenamide 10 mG/unjtzbgeztym788 mG/cobicistat 150 mG/emtricitabine 200 mG (GENVOYA) 1 Tablet(s) Oral daily    MEDICATIONS  (PRN):  ALBUTerol    90 MICROgram(s) HFA Inhaler 2 Puff(s) Inhalation every 6 hours PRN Bronchospasm      Drug Dosing Weight  Height (cm): 185.42 (25 Mar 2020 02:56)  Weight (kg): 96.3 (25 Mar 2020 05:49)  BMI (kg/m2): 28 (25 Mar 2020 05:49)  BSA (m2): 2.21 (25 Mar 2020 05:49)      PAST MEDICAL & SURGICAL HISTORY:  HIV (human immunodeficiency virus infection)  Asthma  Guillain-[place]  Guillain BarrÃ© syndrome  HIV (human immunodeficiency virus infection): from birth  No significant past surgical history  History of orthopedic surgery: left arm      ICU Vital Signs Last 24 Hrs  T(C): 37.4 (01 Apr 2020 03:24), Max: 37.6 (31 Mar 2020 23:51)  T(F): 99.3 (01 Apr 2020 03:24), Max: 99.6 (31 Mar 2020 23:51)  HR: 110 (01 Apr 2020 04:00) (92 - 123)  BP: 123/67 (01 Apr 2020 04:00) (101/54 - 166/72)  BP(mean): 90 (01 Apr 2020 04:00) (75 - 104)  ABP: --  ABP(mean): --  RR: 28 (01 Apr 2020 04:00) (15 - 34)  SpO2: 96% (01 Apr 2020 04:00) (96% - 100%)      ABG - ( 31 Mar 2020 11:24 )  pH, Arterial: 7.33  pH, Blood: x     /  pCO2: 54    /  pO2: 87    / HCO3: 26    / Base Excess: 1.5   /  SaO2: 97                  I&O's Detail    31 Mar 2020 07:01  -  01 Apr 2020 04:53  --------------------------------------------------------  IN:    propofol Infusion: 46.2 mL    Sodium Chloride 0.9% IV Bolus: 1000 mL    Solution: 250 mL  Total IN: 1296.2 mL    OUT:    Indwelling Catheter - Urethral: 750 mL  Total OUT: 750 mL    Total NET: 546.2 mL          Mode: PS (Pressure Support)/ Spontaneous,BIPAP mode on V60  FiO2: 40  PEEP: 6  PS: 8      Physical Exam:    General: NAD    Neurological: A&Ox3, GCS 15, b/l UE 5/5, LLE able to move toes and weakly point and flex foot, cannot lift leg, no resistance to gravity, rle 3/5 strength, sensation intact     Respiratory: no conversational dyspnea  no accessory muscle use, on supplemental o2 via nasal canula 4 liters     Cardiovascular: Regular rate & rhythm, normal S1, S2; no murmurs, gallops or rubs    Gastrointestinal: Soft, non-tender, normal bowel sounds    Gu: Gonzales in place     Extremities: No peripheral edema, No cyanosis, clubbing     Vascular: Equal and normal pulses: 2+ peripheral pulses throughout    Musculoskeletal: No joint pain, swelling or deformity; no limitation of movement    Skin: No rashes    T/L/D:   gonzales catheter  PIV      LABS:  CBC Full  -  ( 01 Apr 2020 04:21 )  WBC Count : 11.08 K/uL  RBC Count : 3.82 M/uL  Hemoglobin : 11.1 g/dL  Hematocrit : 35.1 %  Platelet Count - Automated : 240 K/uL  Mean Cell Volume : 91.9 fl  Mean Cell Hemoglobin : 29.1 pg  Mean Cell Hemoglobin Concentration : 31.6 gm/dL  Auto Neutrophil # : x  Auto Lymphocyte # : x  Auto Monocyte # : x  Auto Eosinophil # : x  Auto Basophil # : x  Auto Neutrophil % : x  Auto Lymphocyte % : x  Auto Monocyte % : x  Auto Eosinophil % : x  Auto Basophil % : x    03-31    133<L>  |  93<L>  |  19.0  ----------------------------<  116<H>  4.3   |  25.0  |  0.99    Ca    9.7      31 Mar 2020 16:44  Phos  5.3     03-31  Mg     1.3     03-31    TPro  7.6  /  Alb  3.9  /  TBili  0.4  /  DBili  x   /  AST  39  /  ALT  22  /  AlkPhos  61  03-31 24 HOUR EVENTS: Patient was unresponsive yesterday and was intubated likely 2/2 to cocaine ingestion, came to the ICU and was able to get extubated with no issues. Patient has no complaints at this time, left lower extremity weakness remains stable from admission. Was tachycardiac to the 113's and hypotensive to the low 90's, given a liter bolus and responded appropriately. And second bolus given for increased bun/creatinine above baseline       MEDICATIONS  (STANDING):  atorvastatin 40 milliGRAM(s) Oral at bedtime  chlorhexidine 2% Cloths 1 Application(s) Topical daily  enalaprilat Injectable 1.25 milliGRAM(s) IV Push every 6 hours  heparin  Injectable 5000 Unit(s) SubCutaneous every 8 hours  magnesium oxide 400 milliGRAM(s) Oral daily  QUEtiapine 100 milliGRAM(s) Oral daily  QUEtiapine 200 milliGRAM(s) Oral at bedtime  tenofovir alafenamide 10 mG/evoydharxbzz498 mG/cobicistat 150 mG/emtricitabine 200 mG (GENVOYA) 1 Tablet(s) Oral daily    MEDICATIONS  (PRN):  ALBUTerol    90 MICROgram(s) HFA Inhaler 2 Puff(s) Inhalation every 6 hours PRN Bronchospasm      Drug Dosing Weight  Height (cm): 185.42 (25 Mar 2020 02:56)  Weight (kg): 96.3 (25 Mar 2020 05:49)  BMI (kg/m2): 28 (25 Mar 2020 05:49)  BSA (m2): 2.21 (25 Mar 2020 05:49)      PAST MEDICAL & SURGICAL HISTORY:  HIV (human immunodeficiency virus infection)  Asthma  Guillain-[place]  Guillain BarrÃ© syndrome  HIV (human immunodeficiency virus infection): from birth  No significant past surgical history  History of orthopedic surgery: left arm      ICU Vital Signs Last 24 Hrs  T(C): 37.4 (01 Apr 2020 03:24), Max: 37.6 (31 Mar 2020 23:51)  T(F): 99.3 (01 Apr 2020 03:24), Max: 99.6 (31 Mar 2020 23:51)  HR: 110 (01 Apr 2020 04:00) (92 - 123)  BP: 123/67 (01 Apr 2020 04:00) (101/54 - 166/72)  BP(mean): 90 (01 Apr 2020 04:00) (75 - 104)  ABP: --  ABP(mean): --  RR: 28 (01 Apr 2020 04:00) (15 - 34)  SpO2: 96% (01 Apr 2020 04:00) (96% - 100%)      ABG - ( 31 Mar 2020 11:24 )  pH, Arterial: 7.33  pH, Blood: x     /  pCO2: 54    /  pO2: 87    / HCO3: 26    / Base Excess: 1.5   /  SaO2: 97                  I&O's Detail    31 Mar 2020 07:01  -  01 Apr 2020 04:53  --------------------------------------------------------  IN:    propofol Infusion: 46.2 mL    Sodium Chloride 0.9% IV Bolus: 1000 mL    Solution: 250 mL  Total IN: 1296.2 mL    OUT:    Indwelling Catheter - Urethral: 750 mL  Total OUT: 750 mL    Total NET: 546.2 mL          Mode: PS (Pressure Support)/ Spontaneous,BIPAP mode on V60  FiO2: 40  PEEP: 6  PS: 8      Physical Exam:    General: NAD    Neurological: A&Ox3, GCS 15, b/l UE 5/5, LLE able to move toes and weakly point and flex foot, cannot lift leg, no resistance to gravity, rle 3/5 strength, sensation intact     Respiratory: no conversational dyspnea  no accessory muscle use, on supplemental o2 via nasal canula 4 liters     Cardiovascular: Regular rate & rhythm, normal S1, S2; no murmurs, gallops or rubs    Gastrointestinal: Soft, non-tender, normal bowel sounds    Gu: Gonzales in place     Extremities: No peripheral edema, No cyanosis, clubbing     Vascular: Equal and normal pulses: 2+ peripheral pulses throughout    Musculoskeletal: No joint pain, swelling or deformity; no limitation of movement    Skin: No rashes    T/L/D:   gonzales catheter  PIV      LABS:  CBC Full  -  ( 01 Apr 2020 04:21 )  WBC Count : 11.08 K/uL  RBC Count : 3.82 M/uL  Hemoglobin : 11.1 g/dL  Hematocrit : 35.1 %  Platelet Count - Automated : 240 K/uL  Mean Cell Volume : 91.9 fl  Mean Cell Hemoglobin : 29.1 pg  Mean Cell Hemoglobin Concentration : 31.6 gm/dL  Auto Neutrophil # : x  Auto Lymphocyte # : x  Auto Monocyte # : x  Auto Eosinophil # : x  Auto Basophil # : x  Auto Neutrophil % : x  Auto Lymphocyte % : x  Auto Monocyte % : x  Auto Eosinophil % : x  Auto Basophil % : x    03-31    133<L>  |  93<L>  |  19.0  ----------------------------<  116<H>  4.3   |  25.0  |  0.99    Ca    9.7      31 Mar 2020 16:44  Phos  5.3     03-31  Mg     1.3     03-31    TPro  7.6  /  Alb  3.9  /  TBili  0.4  /  DBili  x   /  AST  39  /  ALT  22  /  AlkPhos  61  03-31

## 2020-04-01 NOTE — OCCUPATIONAL THERAPY INITIAL EVALUATION ADULT - MANUAL MUSCLE TESTING RESULTS, REHAB EVAL
bilat UEs grossly 4-/5 t/o
bilateral shoulders 4-/5, bilateral triceps/biceps 4-/5, right gross grasp 3+/5, left gross grasp 4/5

## 2020-04-01 NOTE — OCCUPATIONAL THERAPY INITIAL EVALUATION ADULT - LEVEL OF INDEPENDENCE: DRESS LOWER BODY, OT EVAL
moderate assist (50% patients effort)/maximum assist (25% patients effort)/to don socks seated in chair
socks/dependent (less than 25% patients effort)

## 2020-04-01 NOTE — OCCUPATIONAL THERAPY INITIAL EVALUATION ADULT - NS ASR OT EQUIP NEEDS DISCH
will continue to follow to determine appropriate DME for discharge
rolling walker (5 inch wheels)/bathing/toileting

## 2020-04-02 ENCOUNTER — TRANSCRIPTION ENCOUNTER (OUTPATIENT)
Age: 30
End: 2020-04-02

## 2020-04-02 LAB
ANION GAP SERPL CALC-SCNC: 14 MMOL/L — SIGNIFICANT CHANGE UP (ref 5–17)
BASOPHILS # BLD AUTO: 0.02 K/UL — SIGNIFICANT CHANGE UP (ref 0–0.2)
BASOPHILS NFR BLD AUTO: 0.3 % — SIGNIFICANT CHANGE UP (ref 0–2)
BUN SERPL-MCNC: 11 MG/DL — SIGNIFICANT CHANGE UP (ref 8–20)
CALCIUM SERPL-MCNC: 9.4 MG/DL — SIGNIFICANT CHANGE UP (ref 8.6–10.2)
CHLORIDE SERPL-SCNC: 104 MMOL/L — SIGNIFICANT CHANGE UP (ref 98–107)
CO2 SERPL-SCNC: 24 MMOL/L — SIGNIFICANT CHANGE UP (ref 22–29)
CREAT SERPL-MCNC: 0.87 MG/DL — SIGNIFICANT CHANGE UP (ref 0.5–1.3)
EOSINOPHIL # BLD AUTO: 0.34 K/UL — SIGNIFICANT CHANGE UP (ref 0–0.5)
EOSINOPHIL NFR BLD AUTO: 4.4 % — SIGNIFICANT CHANGE UP (ref 0–6)
GLUCOSE SERPL-MCNC: 96 MG/DL — SIGNIFICANT CHANGE UP (ref 70–99)
HCT VFR BLD CALC: 34.9 % — LOW (ref 39–50)
HGB BLD-MCNC: 11.1 G/DL — LOW (ref 13–17)
IMM GRANULOCYTES NFR BLD AUTO: 0.5 % — SIGNIFICANT CHANGE UP (ref 0–1.5)
LYMPHOCYTES # BLD AUTO: 1.82 K/UL — SIGNIFICANT CHANGE UP (ref 1–3.3)
LYMPHOCYTES # BLD AUTO: 23.4 % — SIGNIFICANT CHANGE UP (ref 13–44)
MAGNESIUM SERPL-MCNC: 1.4 MG/DL — LOW (ref 1.6–2.6)
MCHC RBC-ENTMCNC: 29 PG — SIGNIFICANT CHANGE UP (ref 27–34)
MCHC RBC-ENTMCNC: 31.8 GM/DL — LOW (ref 32–36)
MCV RBC AUTO: 91.1 FL — SIGNIFICANT CHANGE UP (ref 80–100)
MONOCYTES # BLD AUTO: 0.91 K/UL — HIGH (ref 0–0.9)
MONOCYTES NFR BLD AUTO: 11.7 % — SIGNIFICANT CHANGE UP (ref 2–14)
NEUTROPHILS # BLD AUTO: 4.64 K/UL — SIGNIFICANT CHANGE UP (ref 1.8–7.4)
NEUTROPHILS NFR BLD AUTO: 59.7 % — SIGNIFICANT CHANGE UP (ref 43–77)
PHOSPHATE SERPL-MCNC: 3 MG/DL — SIGNIFICANT CHANGE UP (ref 2.4–4.7)
PLATELET # BLD AUTO: 270 K/UL — SIGNIFICANT CHANGE UP (ref 150–400)
POTASSIUM SERPL-MCNC: 3.6 MMOL/L — SIGNIFICANT CHANGE UP (ref 3.5–5.3)
POTASSIUM SERPL-SCNC: 3.6 MMOL/L — SIGNIFICANT CHANGE UP (ref 3.5–5.3)
RBC # BLD: 3.83 M/UL — LOW (ref 4.2–5.8)
RBC # FLD: 11.8 % — SIGNIFICANT CHANGE UP (ref 10.3–14.5)
SODIUM SERPL-SCNC: 142 MMOL/L — SIGNIFICANT CHANGE UP (ref 135–145)
WBC # BLD: 7.77 K/UL — SIGNIFICANT CHANGE UP (ref 3.8–10.5)
WBC # FLD AUTO: 7.77 K/UL — SIGNIFICANT CHANGE UP (ref 3.8–10.5)

## 2020-04-02 PROCEDURE — 99232 SBSQ HOSP IP/OBS MODERATE 35: CPT

## 2020-04-02 PROCEDURE — 99233 SBSQ HOSP IP/OBS HIGH 50: CPT

## 2020-04-02 RX ORDER — MAGNESIUM OXIDE 400 MG ORAL TABLET 241.3 MG
400 TABLET ORAL
Refills: 0 | Status: DISCONTINUED | OUTPATIENT
Start: 2020-04-02 | End: 2020-04-03

## 2020-04-02 RX ORDER — MAGNESIUM SULFATE 500 MG/ML
2 VIAL (ML) INJECTION EVERY 4 HOURS
Refills: 0 | Status: COMPLETED | OUTPATIENT
Start: 2020-04-02 | End: 2020-04-02

## 2020-04-02 RX ADMIN — ATORVASTATIN CALCIUM 40 MILLIGRAM(S): 80 TABLET, FILM COATED ORAL at 21:31

## 2020-04-02 RX ADMIN — QUETIAPINE FUMARATE 200 MILLIGRAM(S): 200 TABLET, FILM COATED ORAL at 21:31

## 2020-04-02 RX ADMIN — BENZOCAINE AND MENTHOL 1 LOZENGE: 5; 1 LIQUID ORAL at 01:04

## 2020-04-02 RX ADMIN — HEPARIN SODIUM 5000 UNIT(S): 5000 INJECTION INTRAVENOUS; SUBCUTANEOUS at 17:16

## 2020-04-02 RX ADMIN — ELVITEGRAVIR, COBICISTAT, EMTRICITABINE, AND TENOFOVIR ALAFENAMIDE 1 TABLET(S): 150; 150; 200; 10 TABLET ORAL at 17:15

## 2020-04-02 RX ADMIN — BENZOCAINE AND MENTHOL 1 LOZENGE: 5; 1 LIQUID ORAL at 21:31

## 2020-04-02 RX ADMIN — MAGNESIUM OXIDE 400 MG ORAL TABLET 400 MILLIGRAM(S): 241.3 TABLET ORAL at 17:16

## 2020-04-02 RX ADMIN — QUETIAPINE FUMARATE 100 MILLIGRAM(S): 200 TABLET, FILM COATED ORAL at 17:15

## 2020-04-02 RX ADMIN — Medication 50 GRAM(S): at 11:47

## 2020-04-02 NOTE — PROGRESS NOTE ADULT - SUBJECTIVE AND OBJECTIVE BOX
Patient doing better today.   Did work with therapy yesterday.   Reports no pain.     REVIEW OF SYSTEMS  Constitutional - No fever,  +fatigue  Neurological - +loss of strength    FUNCTIONAL PROGRESS  4/1  Transfer Skill: Bed to Chair   · Level of [place]	moderate assist (50% patients effort); stand pivot transfer	  · Physical Assist/Nonphysical Assist	1 person assist	    Transfer: Sit to Stand:     · Level of [place]	moderate assist (50% patients effort)	  · Physical Assist/Nonphysical Assist	1 person assist	    Transfer: Stand to Sit:     · Level of [place]	moderate assist (50% patients effort)	  · Physical Assist/Nonphysical Assist	1 person assist	    Sit/Stand Transfer Safety Analysis:     · Impairments Contributing to Impaired Transfers	impaired motor control; impaired balance	    Transfer: Toilet Transfer:     · Level of [place]	to be assessed	      VITALS  T(C): 36.5 (04-02-20 @ 08:35), Max: 36.7 (04-02-20 @ 00:02)  HR: 102 (04-02-20 @ 08:35) (96 - 118)  BP: 128/72 (04-02-20 @ 08:35) (128/60 - 143/62)  RR: 19 (04-02-20 @ 08:35) (19 - 27)  SpO2: 94% (04-02-20 @ 08:35) (93% - 95%)  Wt(kg): --    MEDICATIONS   ALBUTerol    90 MICROgram(s) HFA Inhaler 2 Puff(s) every 6 hours PRN  atorvastatin 40 milliGRAM(s) at bedtime  benzocaine 15 mG/menthol 3.6 mG (Sugar-Free) Lozenge 1 Lozenge every 4 hours PRN  heparin  Injectable 5000 Unit(s) every 8 hours  magnesium sulfate  IVPB 2 Gram(s) every 4 hours  QUEtiapine 100 milliGRAM(s) daily  QUEtiapine 200 milliGRAM(s) at bedtime  tenofovir alafenamide 10 mG/suqcecwqdden605 mG/cobicistat 150 mG/emtricitabine 200 mG (GENVOYA) 1 Tablet(s) daily      RECENT LABS - Reviewed                        11.1   7.77  )-----------( 270      ( 02 Apr 2020 08:01 )             34.9     04-02    142  |  104  |  11.0  ----------------------------<  96  3.6   |  24.0  |  0.87    Ca    9.4      02 Apr 2020 08:01  Phos  3.0     04-02  Mg     1.4     04-02    TPro  7.1  /  Alb  3.4  /  TBili  0.4  /  DBili  x   /  AST  27  /  ALT  16  /  AlkPhos  50  04-01              ---------  PHYSICAL EXAM  Constitutional - NAD, Comfortable  Extremities - No C/C/E, No calf tenderness  Neurologic Exam -                    Cognitive - AAOx3     Communication - Dysfluency     Motor - No focal deficits     Sensory - Intact to LT  Psychiatric - Mood WNL, Affect WNL    ASSESSMENT/PLAN  30y Male with history of GBS with left leg weakness (?premorbid)  CVA PPX - ASA, Lipitor, No acute CVA on MRI  HTN - Vasotec  Pulm - Proventil   Mood - Seroquel  HIV - Genvoya  Pain - Tylenol  DVT PPX - SCDs, Heparin  Rehab - Patient continues to need aggressive mobilization OOB with staff. Patient meets criteria for [facility] if unable to progress towards home.     Will continue to follow. Functional progress will determine ongoing rehab dispo recommendations, which may change.    Continue bedside therapy as well as OOB throughout the day with mobilization by staff to maintain cardiopulmonary function and prevention of secondary complications related to debility.

## 2020-04-02 NOTE — DISCHARGE NOTE NURSING/CASE MANAGEMENT/SOCIAL WORK - PATIENT PORTAL LINK FT
You can access the FollowMyHealth Patient Portal offered by [facility] by registering at the following website: [email or web address]. By joining [facility]’s FollowMyHealth portal, you will also be able to view your health information using other applications (apps) compatible with our system.

## 2020-04-02 NOTE — PROGRESS NOTE ADULT - SUBJECTIVE AND OBJECTIVE BOX
CC: stroke, s/p tPa (02 Apr 2020 11:22)    HPI:  30M PMHx HIV, Guillain [place] since 2017 w/ residual b/l LE weakness Left >RIght, ambulates independently at baseline, presented to ED c/o b/l leg weakness L>R. 	    NIHSS=2 (might be baseline) (25 Mar 2020 08:01)    INTERVAL HPI/OVERNIGHT EVENTS: Patient seen and examined lying in bed.  Patient denies any headache, dizziness, SOB, CP, abdominal pain, nausea, vomiting, dysuria.  Other ROS reviewed and are negative.    Vital Signs Last 24 Hrs  T(C): 36.5 (02 Apr 2020 08:35), Max: 36.7 (02 Apr 2020 00:02)  T(F): 97.7 (02 Apr 2020 08:35), Max: 98 (02 Apr 2020 00:02)  HR: 102 (02 Apr 2020 08:35) (96 - 111)  BP: 128/72 (02 Apr 2020 08:35) (128/60 - 141/78)  BP(mean): 86 (01 Apr 2020 15:20) (86 - 86)  RR: 19 (02 Apr 2020 08:35) (19 - 22)  SpO2: 94% (02 Apr 2020 08:35) (94% - 95%)  I&O's Detail    01 Apr 2020 07:01  -  02 Apr 2020 07:00  --------------------------------------------------------  IN:    multiple electrolytes Injection Type 1 Bolus: 1000 mL    Oral Fluid: 380 mL  Total IN: 1380 mL    OUT:    Voided: 2000 mL  Total OUT: 2000 mL    Total NET: -620 mL    PHYSICAL EXAM:  GENERAL: NAD  HEAD:  Atraumatic, Normocephalic  NECK: Supple, No JVD, Normal thyroid  NERVOUS SYSTEM:  Alert, generalized weakness  CHEST/LUNG: Clear to auscultation bilaterally; No rales, rhonchi, wheezing, or rubs  HEART: Regular rate and rhythm; No murmurs, rubs, or gallops  ABDOMEN: Soft, Nontender, Nondistended; Bowel sounds present  EXTREMITIES:  2+ Peripheral Pulses, No clubbing, cyanosis, or edema        CARDIAC MARKERS ( 01 Apr 2020 04:21 )  x     / <0.01 ng/mL / x     / x     / x      CARDIAC MARKERS ( 31 Mar 2020 20:57 )  x     / <0.01 ng/mL / 456 U/L / x     / 2.9 ng/mL  CARDIAC MARKERS ( 31 Mar 2020 16:44 )  x     / <0.01 ng/mL / x     / x     / x                                11.1   7.77  )-----------( 270      ( 02 Apr 2020 08:01 )             34.9     02 Apr 2020 08:01    142    |  104    |  11.0   ----------------------------<  96     3.6     |  24.0   |  0.87     Ca    9.4        02 Apr 2020 08:01  Phos  3.0       02 Apr 2020 08:01  Mg     1.4       02 Apr 2020 08:01    TPro  7.1    /  Alb  3.4    /  TBili  0.4    /  DBili  x      /  AST  27     /  ALT  16     /  AlkPhos  50     01 Apr 2020 04:21      LIVER FUNCTIONS - ( 01 Apr 2020 04:21 )  Alb: 3.4 g/dL / Pro: 7.1 g/dL / ALK PHOS: 50 U/L / ALT: 16 U/L / AST: 27 U/L / GGT: x               MEDICATIONS  (STANDING):  atorvastatin 40 milliGRAM(s) Oral at bedtime  heparin  Injectable 5000 Unit(s) SubCutaneous every 8 hours  QUEtiapine 100 milliGRAM(s) Oral daily  QUEtiapine 200 milliGRAM(s) Oral at bedtime  tenofovir alafenamide 10 mG/clvlhkdyrpbr195 mG/cobicistat 150 mG/emtricitabine 200 mG (GENVOYA) 1 Tablet(s) Oral daily    MEDICATIONS  (PRN):  ALBUTerol    90 MICROgram(s) HFA Inhaler 2 Puff(s) Inhalation every 6 hours PRN Bronchospasm  benzocaine 15 mG/menthol 3.6 mG (Sugar-Free) Lozenge 1 Lozenge Oral every 4 hours PRN Sore Throat

## 2020-04-02 NOTE — PROGRESS NOTE ADULT - ATTENDING COMMENTS
The patient was seen and examined  Events noted   No new problems    Neurologically:  GCS=15, no new focal deficits,  Stable    Plan:  Can transfer to the floor
agree with NP    CVA RULED OUT  gen weakness  dc planning...BRAYDEN?  sw/cm re placement
Above note reviewed, I concur with the NP's plan.
No longer unresponsiv.e successfully extubated.  doing well.      Mild OREN, hyponatremia.  likely hypovolemic with regular tachycardia.  IV NSS.  Ok for transfer out of ICU.
Patient seen and examined

## 2020-04-02 NOTE — PROGRESS NOTE ADULT - ASSESSMENT
30M PMHx HIV, Guillain [place] since 2017 w/ residual b/l LE weakness Left >RIght, ambulates independently at baseline. seen in ER 3/24--per triage note unresponsive s/p narcan states used marijuana.  Received TPA for suspected CVA. MRI w/o acute cva., course complicated by acute unresponsive episode s/p intubation/extubation. utox positive for cocaine/marijuana.     acute encephalopathy: suspect due to drug use.   resolved, monitor    left leg weakness: CVA ruled out    s/p tPA   c/w aspirin/statin   PT recommended acute rehab; PM&R recommended [facility]    HIV: home meds    ppx: heparin.      Sw/CM aware  dc once cleared by PT/OT/Pm&R

## 2020-04-03 ENCOUNTER — EMERGENCY (EMERGENCY)
Facility: HOSPITAL | Age: 30
LOS: 1 days | Discharge: HOME | End: 2020-04-03
Attending: EMERGENCY MEDICINE
Payer: COMMERCIAL

## 2020-04-03 VITALS
SYSTOLIC BLOOD PRESSURE: 127 MMHG | WEIGHT: 205.03 LBS | HEART RATE: 89 BPM | TEMPERATURE: 98 F | OXYGEN SATURATION: 98 % | RESPIRATION RATE: 18 BRPM | HEIGHT: 73 IN | DIASTOLIC BLOOD PRESSURE: 68 MMHG

## 2020-04-03 VITALS
RESPIRATION RATE: 19 BRPM | HEART RATE: 90 BPM | DIASTOLIC BLOOD PRESSURE: 80 MMHG | TEMPERATURE: 98 F | OXYGEN SATURATION: 95 % | SYSTOLIC BLOOD PRESSURE: 130 MMHG

## 2020-04-03 DIAGNOSIS — Z98.890 OTHER SPECIFIED POSTPROCEDURAL STATES: Chronic | ICD-10-CM

## 2020-04-03 DIAGNOSIS — Z88.8 ALLERGY STATUS TO OTHER DRUGS, MEDICAMENTS AND BIOLOGICAL SUBSTANCES STATUS: ICD-10-CM

## 2020-04-03 DIAGNOSIS — F17.200 NICOTINE DEPENDENCE, UNSPECIFIED, UNCOMPLICATED: ICD-10-CM

## 2020-04-03 DIAGNOSIS — M25.562 PAIN IN LEFT KNEE: ICD-10-CM

## 2020-04-03 DIAGNOSIS — R26.2 DIFFICULTY IN WALKING, NOT ELSEWHERE CLASSIFIED: ICD-10-CM

## 2020-04-03 DIAGNOSIS — J45.909 UNSPECIFIED ASTHMA, UNCOMPLICATED: ICD-10-CM

## 2020-04-03 DIAGNOSIS — R53.1 WEAKNESS: ICD-10-CM

## 2020-04-03 PROCEDURE — 97163 PT EVAL HIGH COMPLEX 45 MIN: CPT

## 2020-04-03 PROCEDURE — 99220: CPT

## 2020-04-03 PROCEDURE — 73562 X-RAY EXAM OF KNEE 3: CPT | Mod: 26,LT

## 2020-04-03 PROCEDURE — 82140 ASSAY OF AMMONIA: CPT

## 2020-04-03 PROCEDURE — 87486 CHLMYD PNEUM DNA AMP PROBE: CPT

## 2020-04-03 PROCEDURE — 83605 ASSAY OF LACTIC ACID: CPT

## 2020-04-03 PROCEDURE — 94002 VENT MGMT INPAT INIT DAY: CPT

## 2020-04-03 PROCEDURE — 94760 N-INVAS EAR/PLS OXIMETRY 1: CPT

## 2020-04-03 PROCEDURE — 85730 THROMBOPLASTIN TIME PARTIAL: CPT

## 2020-04-03 PROCEDURE — 99239 HOSP IP/OBS DSCHRG MGMT >30: CPT

## 2020-04-03 PROCEDURE — 87798 DETECT AGENT NOS DNA AMP: CPT

## 2020-04-03 PROCEDURE — 82803 BLOOD GASES ANY COMBINATION: CPT

## 2020-04-03 PROCEDURE — 84100 ASSAY OF PHOSPHORUS: CPT

## 2020-04-03 PROCEDURE — 70498 CT ANGIOGRAPHY NECK: CPT

## 2020-04-03 PROCEDURE — 97116 GAIT TRAINING THERAPY: CPT

## 2020-04-03 PROCEDURE — 82553 CREATINE MB FRACTION: CPT

## 2020-04-03 PROCEDURE — 71045 X-RAY EXAM CHEST 1 VIEW: CPT

## 2020-04-03 PROCEDURE — 84443 ASSAY THYROID STIM HORMONE: CPT

## 2020-04-03 PROCEDURE — 84295 ASSAY OF SERUM SODIUM: CPT

## 2020-04-03 PROCEDURE — 0042T: CPT

## 2020-04-03 PROCEDURE — 83036 HEMOGLOBIN GLYCOSYLATED A1C: CPT

## 2020-04-03 PROCEDURE — 83735 ASSAY OF MAGNESIUM: CPT

## 2020-04-03 PROCEDURE — 97530 THERAPEUTIC ACTIVITIES: CPT

## 2020-04-03 PROCEDURE — 80061 LIPID PANEL: CPT

## 2020-04-03 PROCEDURE — 82550 ASSAY OF CK (CPK): CPT

## 2020-04-03 PROCEDURE — 99233 SBSQ HOSP IP/OBS HIGH 50: CPT

## 2020-04-03 PROCEDURE — 82947 ASSAY GLUCOSE BLOOD QUANT: CPT

## 2020-04-03 PROCEDURE — 70496 CT ANGIOGRAPHY HEAD: CPT

## 2020-04-03 PROCEDURE — 85610 PROTHROMBIN TIME: CPT

## 2020-04-03 PROCEDURE — 70551 MRI BRAIN STEM W/O DYE: CPT

## 2020-04-03 PROCEDURE — 36415 COLL VENOUS BLD VENIPUNCTURE: CPT

## 2020-04-03 PROCEDURE — 94640 AIRWAY INHALATION TREATMENT: CPT

## 2020-04-03 PROCEDURE — 99291 CRITICAL CARE FIRST HOUR: CPT | Mod: 25

## 2020-04-03 PROCEDURE — 84132 ASSAY OF SERUM POTASSIUM: CPT

## 2020-04-03 PROCEDURE — 85027 COMPLETE CBC AUTOMATED: CPT

## 2020-04-03 PROCEDURE — 80307 DRUG TEST PRSMV CHEM ANLYZR: CPT

## 2020-04-03 PROCEDURE — 37195 THROMBOLYTIC THERAPY STROKE: CPT

## 2020-04-03 PROCEDURE — 85014 HEMATOCRIT: CPT

## 2020-04-03 PROCEDURE — 93005 ELECTROCARDIOGRAM TRACING: CPT

## 2020-04-03 PROCEDURE — 87581 M.PNEUMON DNA AMP PROBE: CPT

## 2020-04-03 PROCEDURE — 97167 OT EVAL HIGH COMPLEX 60 MIN: CPT

## 2020-04-03 PROCEDURE — 82330 ASSAY OF CALCIUM: CPT

## 2020-04-03 PROCEDURE — 97535 SELF CARE MNGMENT TRAINING: CPT

## 2020-04-03 PROCEDURE — 80053 COMPREHEN METABOLIC PANEL: CPT

## 2020-04-03 PROCEDURE — 80048 BASIC METABOLIC PNL TOTAL CA: CPT

## 2020-04-03 PROCEDURE — 70450 CT HEAD/BRAIN W/O DYE: CPT

## 2020-04-03 PROCEDURE — C8929: CPT

## 2020-04-03 PROCEDURE — 84484 ASSAY OF TROPONIN QUANT: CPT

## 2020-04-03 PROCEDURE — 92523 SPEECH SOUND LANG COMPREHEN: CPT

## 2020-04-03 PROCEDURE — 82435 ASSAY OF BLOOD CHLORIDE: CPT

## 2020-04-03 PROCEDURE — 82962 GLUCOSE BLOOD TEST: CPT

## 2020-04-03 PROCEDURE — 87633 RESP VIRUS 12-25 TARGETS: CPT

## 2020-04-03 RX ADMIN — ELVITEGRAVIR, COBICISTAT, EMTRICITABINE, AND TENOFOVIR ALAFENAMIDE 1 TABLET(S): 150; 150; 200; 10 TABLET ORAL at 13:01

## 2020-04-03 RX ADMIN — QUETIAPINE FUMARATE 100 MILLIGRAM(S): 200 TABLET, FILM COATED ORAL at 11:35

## 2020-04-03 RX ADMIN — MAGNESIUM OXIDE 400 MG ORAL TABLET 400 MILLIGRAM(S): 241.3 TABLET ORAL at 11:35

## 2020-04-03 RX ADMIN — MAGNESIUM OXIDE 400 MG ORAL TABLET 400 MILLIGRAM(S): 241.3 TABLET ORAL at 09:03

## 2020-04-03 NOTE — PROGRESS NOTE ADULT - SUBJECTIVE AND OBJECTIVE BOX
Patient reports he is dong better.  He states he has been walking on his own to the bathroom.  As per discussion with RN, he is ambulating independently as well.   Weakness in the left leg resolved.    REVIEW OF SYSTEMS  Constitutional - No fever,  No fatigue  HEENT - No vertigo, No neck pain  Neurological - No headaches, No memory loss, No loss of strength, No numbness, No tremors  Skin - No rashes, No lesions   Musculoskeletal - No joint pain, No joint swelling, No muscle pain  Psychiatric - No depression, No anxiety    FUNCTIONAL PROGRESS  INDEPENDENT    VITALS  T(C): 36.7 (04-02-20 @ 23:50), Max: 36.7 (04-02-20 @ 23:50)  HR: 84 (04-02-20 @ 23:50) (84 - 92)  BP: 134/84 (04-02-20 @ 23:50) (134/84 - 144/76)  RR: 19 (04-02-20 @ 23:50) (18 - 19)  SpO2: 94% (04-02-20 @ 23:50) (94% - 94%)  Wt(kg): --    MEDICATIONS   ALBUTerol    90 MICROgram(s) HFA Inhaler 2 Puff(s) every 6 hours PRN  atorvastatin 40 milliGRAM(s) at bedtime  benzocaine 15 mG/menthol 3.6 mG (Sugar-Free) Lozenge 1 Lozenge every 4 hours PRN  heparin  Injectable 5000 Unit(s) every 8 hours  magnesium oxide 400 milliGRAM(s) three times a day with meals  QUEtiapine 100 milliGRAM(s) daily  QUEtiapine 200 milliGRAM(s) at bedtime  tenofovir alafenamide 10 mG/lgoeqqsipigg194 mG/cobicistat 150 mG/emtricitabine 200 mG (GENVOYA) 1 Tablet(s) daily      RECENT LABS - Reviewed                        11.1   7.77  )-----------( 270      ( 02 Apr 2020 08:01 )             34.9     04-02    142  |  104  |  11.0  ----------------------------<  96  3.6   |  24.0  |  0.87    Ca    9.4      02 Apr 2020 08:01  Phos  3.0     04-02  Mg     1.4     04-02              ---------  PHYSICAL EXAM  Constitutional - NAD, Comfortable  Extremities - No C/C/E, No calf tenderness  Neurologic Exam -                    Cognitive - AAOx3     Communication - Dysfluency     Motor - No focal deficits     Sensory - Intact to LT  Psychiatric - Mood WNL, Affect WNL    ASSESSMENT/PLAN  30y Male with history of GBS with left leg weakness (?premorbid)  CVA PPX - ASA, Lipitor, No acute CVA on MRI  HTN - Vasotec  Pulm - Proventil   Mood - Seroquel  HIV - [place]  Pain - Tylenol  DVT PPX - SCDs, Heparin  Rehab - Patient is independent as per discussion with patient and RN.  Do not recommend ongoing bedside therapy. Encourage OOB throughout the day.  Recommend DC to [facility] vs. [facility].

## 2020-04-03 NOTE — ED ADULT NURSE REASSESSMENT NOTE - NS ED NURSE REASSESS COMMENT FT1
report received from Florentino MENDEZ.  Pt resting comfortably on stretcher.  No complaints of pain.  Respirations even and unlabored.  Awaiting PT eval.  In NAD, will continue to monitor.

## 2020-04-03 NOTE — PROGRESS NOTE ADULT - ASSESSMENT
30M PMHx HIV, Guillain [place] since 2017 w/ residual b/l LE weakness Left >RIght, ambulates independently at baseline. seen in ER 3/24--per triage note unresponsive s/p narcan states used marijuana.  Received TPA for suspected CVA. MRI w/o acute cva., course complicated by acute unresponsive episode s/p intubation/extubation. utox positive for cocaine/marijuana.     acute encephalopathy: suspect due to drug use.   resolved, monitor    left leg weakness: CVA ruled out    s/p tPA   c/w aspirin/statin   PT recommended acute rehab; PM&R recommended [facility] vs home     HIV: home meds    ppx: heparin.    dispo: d/w SW/CM  no offers from [facility] or shelters. patient medically stable for discharge.

## 2020-04-03 NOTE — ED PROVIDER NOTE - PMH
Asthma    Guillain BarrÃ© syndrome    Guillain-[place]    HIV (human immunodeficiency virus infection)    HIV (human immunodeficiency virus infection)  from birth

## 2020-04-03 NOTE — ED CDU PROVIDER INITIAL DAY NOTE - ATTENDING CONTRIBUTION TO CARE
I personally saw the patient with the PA, and completed the key components of the history and physical exam. I then discussed the management plan with the PA.    Pt to stay overnight for PT eval in AM and placement in [facility] vs. shelter

## 2020-04-03 NOTE — ED ADULT TRIAGE NOTE - CHIEF COMPLAINT QUOTE
patient states that he was recently DC, today from , spoke with SW in ER for re-admission for weakness to left side- HX of stroke, this admission, needs PT ambulates with difficulty

## 2020-04-03 NOTE — ED PROVIDER NOTE - CLINICAL SUMMARY MEDICAL DECISION MAKING FREE TEXT BOX
Pt presents for [facility] vs. [facility] Placement.  Per SW patient to wait in ER for PT eval in the morning and repeat attempt at placement tomorrow.  Will xray knee.

## 2020-04-03 NOTE — ED PROVIDER NOTE - OBJECTIVE STATEMENT
30yoM with h/o HIV, GBS with residual b/l LE weakness, presented to the hospital recently for worsened LLE weakness and received TPA, was RR for agonal breathing/ unresponsiveness and had transient intubation;  Pt was evaluated by PMR and was recommended to DC vs. [facility] after pt was ambulating independently per multiple reports.. SW reportedly very involved in case to assist in placement but patient advised them that he had a 'place to stay' and was discharged.  PT reports now that he thought he could get into DSS on his own if he called after hours, but was rejected when he tried. He now returns to the ER to get put into placement.  Pt c/o L knee  ' pain' and states it feels different before, but states it is making it hard for him to walk.

## 2020-04-03 NOTE — PROGRESS NOTE ADULT - SUBJECTIVE AND OBJECTIVE BOX
seen for weakness, drug use    no acute complaints/wants to go home.  ros negative otherwise     MEDICATIONS  (STANDING):  atorvastatin 40 milliGRAM(s) Oral at bedtime  heparin  Injectable 5000 Unit(s) SubCutaneous every 8 hours  magnesium oxide 400 milliGRAM(s) Oral three times a day with meals  QUEtiapine 100 milliGRAM(s) Oral daily  QUEtiapine 200 milliGRAM(s) Oral at bedtime  tenofovir alafenamide 10 mG/oznbegzrhrgm754 mG/cobicistat 150 mG/emtricitabine 200 mG (GENVOYA) 1 Tablet(s) Oral daily    MEDICATIONS  (PRN):  ALBUTerol    90 MICROgram(s) HFA Inhaler 2 Puff(s) Inhalation every 6 hours PRN Bronchospasm  benzocaine 15 mG/menthol 3.6 mG (Sugar-Free) Lozenge 1 Lozenge Oral every 4 hours PRN Sore Throat      Allergies    Ceclor (Unknown)      Vital Signs Last 24 Hrs  T(C): 36.6 (03 Apr 2020 08:38), Max: 36.7 (02 Apr 2020 23:50)  T(F): 97.9 (03 Apr 2020 08:38), Max: 98 (02 Apr 2020 23:50)  HR: 89 (03 Apr 2020 08:38) (84 - 92)  BP: 133/79 (03 Apr 2020 08:38) (133/79 - 144/76)  BP(mean): --  RR: 19 (03 Apr 2020 08:38) (18 - 19)  SpO2: 94% (02 Apr 2020 23:50) (94% - 94%)    PHYSICAL EXAM:    GENERAL: NAD  CHEST/LUNG: Clear to percussion bilaterally  HEART: Regular rate and rhythm; S1 S2  ABDOMEN: Soft,  Bowel sounds present  EXTREMITIES: no edema   NERVOUS SYSTEM:  Alert & Oriented X3, ambulatory no focal neuro deficits noted     LABS:                        11.1   7.77  )-----------( 270      ( 02 Apr 2020 08:01 )             34.9     04-02    142  |  104  |  11.0  ----------------------------<  96  3.6   |  24.0  |  0.87    Ca    9.4      02 Apr 2020 08:01  Phos  3.0     04-02  Mg     1.4     04-02            CAPILLARY BLOOD GLUCOSE            RADIOLOGY & ADDITIONAL TESTS:

## 2020-04-03 NOTE — ED PROVIDER NOTE - PHYSICAL EXAMINATION
Pt able to get up from bed independently bearing weight on both Legs  Able to ambulate with b/l bent legs, intermittently pushing himself up.

## 2020-04-03 NOTE — ED CDU PROVIDER INITIAL DAY NOTE - PMH
Asthma    Guillain BarrÃ© syndrome    Guillain-[place]    HIV (human immunodeficiency virus infection)    HIV (human immunodeficiency virus infection)  from birth

## 2020-04-03 NOTE — PROGRESS NOTE ADULT - REASON FOR ADMISSION
stroke, s/p tPa

## 2020-04-03 NOTE — CHART NOTE - NSCHARTNOTEFT_GEN_A_CORE
Liza: pt was d/c from [facility] this afternoon after he told RN (Ann Marie) he had a place to go and he wanted to leave. Pt returned few hours later and requested to speak with SW. Pt told this worker he thought he could get shelter by calling after hours and that's why he told RN he had a place to go. However, when he called after hours he was told he couldn't be assisted and he needed proper paperwork for placement. Worker called after hours (Don) to obtain info about possible placement if we provide updated CONCHITA however, per Don unable to be done through his office,[facility] and states pt must ambulate independently.  Worker called SW manager(FELICIANO Moore) informed about the above mentioned, worker encouraged to have pt evaluated by PT in the am in order to determine proper dispo. Pt will remain in ED to be evaluated first thing in the am when PT is available. Worker will leave pt on handoff for SW to follow . Liza: pt was d/c from [facility] this afternoon after he told RN (Ann Marie) he had a place to go and he wanted to leave. Pt returned few hours later and requested to speak with SW. Pt told this worker he thought he could get shelter by calling after hours and that's why he told RN he had a place to go. However, when he called after hours he was told he couldn't be assisted and he needed proper paperwork for placement. Worker called after hours (Don) to obtain info about possible placement if we provide updated CONCHITA however, per Don unable to be done through his office,must be processed through [facility] and states pt must ambulate independently.  Worker called SW manager(FELICIANO Moore) informed about the above mentioned, worker encouraged to have pt evaluated by PT in the am in order to determine proper dispo. Pt will remain in ED to be evaluated first thing in the am when PT is available. Worker will leave pt on handoff for SW to follow . Pt's nurse (Gio) and MD(Lolly) made aware, understood.

## 2020-04-04 PROCEDURE — 99226: CPT

## 2020-04-04 NOTE — PHYSICAL THERAPY INITIAL EVALUATION ADULT - PHYSICAL ASSIST/NONPHYSICAL ASSIST: STAND/SIT, REHAB EVAL
verbal cues/1 person assist/min assist: patient able to perform 75% of physical effort/work to perform task, while treating physical therapist performs 25% of physical effort/work; pt required physical assistance to transfer/to safely lower to a sitting posture due to bilateral LE's weakness and decreased balance

## 2020-04-04 NOTE — PHYSICAL THERAPY INITIAL EVALUATION ADULT - IMPAIRED TRANSFERS: SIT/STAND, REHAB EVAL
decreased strength/impaired balance/decreased ROM/impaired postural control/unable to fully extend bilateral LE's due to decreased bilateral LE's weakness and decreased balance

## 2020-04-04 NOTE — PHYSICAL THERAPY INITIAL EVALUATION ADULT - DISCHARGE DISPOSITION, PT EVAL
[facility] for strength, transfers, balance, endurance and ambulation/stair training as pt is an increased falls risk and is not deemed safe to return home without assistance for safety + falls prevention/rehabilitation facility

## 2020-04-04 NOTE — ED CDU PROVIDER SUBSEQUENT DAY NOTE - PMH
Asthma    Guillain BarrÃ© syndrome    Guillain-[place]    HIV (human immunodeficiency virus infection)    HIV (human immunodeficiency virus infection)  from birth

## 2020-04-04 NOTE — PHYSICAL THERAPY INITIAL EVALUATION ADULT - CRITERIA FOR SKILLED THERAPEUTIC INTERVENTIONS
impairments found/functional limitations in following categories/therapy frequency/predicted duration of therapy intervention/risk reduction/prevention/rehab potential/anticipated discharge recommendation/anticipated equipment needs at discharge

## 2020-04-04 NOTE — PHYSICAL THERAPY INITIAL EVALUATION ADULT - TRANSFER SAFETY CONCERNS NOTED: SIT/STAND, REHAB EVAL
stepping too close to front of assistive device/decreased weight-shifting ability/decreased step length/decreased balance during turns

## 2020-04-04 NOTE — CHART NOTE - NSCHARTNOTEFT_GEN_A_CORE
SW met with patient whom reported they prefer to attend [facility] facility upon discharge. Patient completed signed SCREEN. Patient reported he does not have a preferred facility. SW then uploaded SCREEN to [employer]. SW circulated CONCHITA to [facility] facilities and continues to follow the case.

## 2020-04-04 NOTE — CHART NOTE - NSCHARTNOTEFT_GEN_A_CORE
ROSALINO spoke with , Ann Marie, who advised ROSALINO to continue to continue to circulate patient's CONCHITA for placement. ROSALINO continues to follow the case.

## 2020-04-04 NOTE — PHYSICAL THERAPY INITIAL EVALUATION ADULT - PHYSICAL ASSIST/NONPHYSICAL ASSIST: GAIT, REHAB EVAL
min assist: patient able to perform 75% of physical effort/work to perform task, while treating physical therapist performs 25% of physical effort/work; pt required increased physical assistance to help maintain upright standing/walking posture + required verbal cues for Left LE foot clearance, to increased bilateral LE's knee extension during ambulation and for upright standing posture during ambulation; verbal cues re proper gait sequence + proper use of RW/verbal cues/1 person assist

## 2020-04-04 NOTE — PHYSICAL THERAPY INITIAL EVALUATION ADULT - GENERAL OBSERVATIONS, REHAB EVAL
Pt observed in ERHR/SUBWR [place], ANDREA schaefer'ed pt for PT; pt observed sitting at EOB, pleasant, cooperative, A&O and c/o 0/10 pain

## 2020-04-04 NOTE — PHYSICAL THERAPY INITIAL EVALUATION ADULT - PHYSICAL ASSIST/NONPHYSICAL ASSIST: SIT/STAND, REHAB EVAL
min assist: patient able to perform 75% of physical effort/work to perform task, while treating physical therapist performs 25% of physical effort/work; pt required physical assistance to help rise to standing position and required increased physical assistance to help maintain upright standing posture due to increased bilateral LE's weakness and decreased balance

## 2020-04-04 NOTE — PHYSICAL THERAPY INITIAL EVALUATION ADULT - MANUAL MUSCLE TESTING RESULTS, REHAB EVAL
bilateral UE's + bilateral LE's grossly assessed via functional assessment of sit to stand transfer, 3/5

## 2020-04-04 NOTE — ED ADULT NURSE REASSESSMENT NOTE - NS ED NURSE REASSESS COMMENT FT1
Pt sleeping comfortably on stretcher.  No nonverbal indicators of pain present.  Respirations even and unlabored.  Awaiting PT eval.   In NAD, will continue to monitor.

## 2020-04-04 NOTE — PHYSICAL THERAPY INITIAL EVALUATION ADULT - GAIT DEVIATIONS NOTED, PT EVAL
footdrop/decreased stride length/decreased cabrera/decreased weight-shifting ability/decreased step length

## 2020-04-04 NOTE — ED CDU PROVIDER SUBSEQUENT DAY NOTE - PROGRESS NOTE DETAILS
Pt seen and evaluated on morning observations rounds. Pt well appearing, denies any complaints. Lungs CTA CVS S1S2. + B/L LE weakness  L>R. 2+DTR patella. Pt repotrs weakness is unchanged from recent hospital stay. MRI negative for acute stroke but will need out pt f/u with Neurology.   Pt is a 30M PMHx HIV, Guillain [place] since 2017 w/ residual b/l LE weakness Left >Right, ambulates independently at baseline. Patient recently incarnated and day after being incarcerated, he was at [facility] and became unresponsive. Pt was seen in ER 3/24--for unresponsive s/p narcan, with improvement in mentation. Received TPA for suspected CVA. MRI w/o acute cva., course complicated by acute unresponsive episode s/p intubation/extubation. Utox positive for cocaine/opiates.  SW  assisted in placement but patient advised them that he had a 'place to stay' and was discharged at that time.  PT reports that he thought he could get into DSS on his own if he called after hours, but was rejected when he tried and then presented back to the ER to get put into placement.  Pt c/o L knee  ' pain' - had XR (neg) and reports self resolved. Patient evaluated by PT who recommended acute rehab at this time.    SW to help facilitate placement   stable for discharge.  social work working on shelter vs [facility] Pt seen and evaluated on morning observations rounds. Pt well appearing, denies any complaints. Lungs CTA CVS S1S2. + B/L LE weakness  L>R. 2+DTR patella. Pt reports weakness is unchanged from recent hospital stay. MRI negative for acute stroke but will need out pt f/u with Neurology.   Pt is a 30M PMHx HIV, Guillain [place] since 2017 w/ residual b/l LE weakness Left >Right, ambulates independently at baseline. Patient recently incarnated and day after being incarcerated, he was at [facility] and became unresponsive. Pt was seen in ER 3/24--for unresponsive s/p narcan, with improvement in mentation. Received TPA for suspected CVA. MRI w/o acute cva., course complicated by acute unresponsive episode s/p intubation/extubation. Utox positive for cocaine/opiates.  SW  assisted in placement but patient advised them that he had a 'place to stay' and was discharged at that time.  PT reports that he thought he could get into DSS on his own if he called after hours, but was rejected when he tried and then presented back to the ER to get put into placement.  Pt c/o L knee  ' pain' - had XR (neg) and reports self resolved. Patient evaluated by PT who recommended acute rehab at this time.    SW to help facilitate placement sign out ANA scott   pending sw placement

## 2020-04-04 NOTE — ED CDU PROVIDER SUBSEQUENT DAY NOTE - HISTORY
30yoM with h/o HIV, GBS with residual b/l LE weakness in obs for PT/SW in AM. Xray knee unremarkable. Pt with no complaints at this time, resting comfortably. Will continue to monitor.

## 2020-04-04 NOTE — PHYSICAL THERAPY INITIAL EVALUATION ADULT - ADDITIONAL COMMENTS
Pt reports being homeless and stated that he was recently d/c'ed from [facility], where PT made recommendations for pt to ambulate with RW. Pt has no DME. At this time, RW is recommended upon d/c

## 2020-04-04 NOTE — PHYSICAL THERAPY INITIAL EVALUATION ADULT - DIAGNOSIS, PT EVAL
Decreased functional status and mobility due to decrease strength, transfers, balance, endurance and ambulation/stair tolerance

## 2020-04-04 NOTE — CHART NOTE - NSCHARTNOTEFT_GEN_A_CORE
As per PT evaluation, patient is recommended to attend [facility] once stable for discharge. SW requested CONCHITA with [facility]. ROSALINO continues to follow for placement.

## 2020-04-05 PROCEDURE — 97163 PT EVAL HIGH COMPLEX 45 MIN: CPT

## 2020-04-05 PROCEDURE — G0378: CPT

## 2020-04-05 PROCEDURE — 73562 X-RAY EXAM OF KNEE 3: CPT

## 2020-04-05 PROCEDURE — 99283 EMERGENCY DEPT VISIT LOW MDM: CPT

## 2020-04-05 PROCEDURE — 99226: CPT

## 2020-04-05 RX ORDER — QUETIAPINE FUMARATE 200 MG/1
100 TABLET, FILM COATED ORAL DAILY
Refills: 0 | Status: DISCONTINUED | OUTPATIENT
Start: 2020-04-05 | End: 2020-04-08

## 2020-04-05 RX ORDER — ELVITEGRAVIR, COBICISTAT, EMTRICITABINE, AND TENOFOVIR ALAFENAMIDE 150; 150; 200; 10 MG/1; MG/1; MG/1; MG/1
1 TABLET ORAL DAILY
Refills: 0 | Status: DISCONTINUED | OUTPATIENT
Start: 2020-04-05 | End: 2020-04-08

## 2020-04-05 RX ADMIN — QUETIAPINE FUMARATE 100 MILLIGRAM(S): 200 TABLET, FILM COATED ORAL at 21:30

## 2020-04-05 RX ADMIN — ELVITEGRAVIR, COBICISTAT, EMTRICITABINE, AND TENOFOVIR ALAFENAMIDE 1 TABLET(S): 150; 150; 200; 10 TABLET ORAL at 21:31

## 2020-04-05 NOTE — ED CDU PROVIDER SUBSEQUENT DAY NOTE - PROGRESS NOTE DETAILS
Pt seen resting comfortable in no acute distress in bed, no acute complaints will follow CDU initial intake orders observe for change in pt condition, results and or consults.   PT signed out to ANA [place] after lengthy discussion about case with ANA JOHNSON.

## 2020-04-05 NOTE — ED ADULT NURSE REASSESSMENT NOTE - NS ED NURSE REASSESS COMMENT FT1
Per Avril Hinkle pt was seen outside smoking Per Avril Hinkle pt was seen outside smoking with another pt. ANA Ferreira made aware pt may wish to AMA and advised he will speak to pt. Pt now at 1020 resting comfortably on stretcher, will continue to monitor.

## 2020-04-05 NOTE — ED ADULT NURSE REASSESSMENT NOTE - NS ED NURSE REASSESS COMMENT FT1
Pt sleeping peacefully on stretcher, arouses to voice and subsequently alert and oriented x 3, vss, no complaints, await sw, will continue to monitor.

## 2020-04-05 NOTE — ED CDU PROVIDER SUBSEQUENT DAY NOTE - PMH
Asthma    Guillain BarrÃ© syndrome    Guillain-[place]    HIV (human immunodeficiency virus infection)    HIV (human immunodeficiency virus infection)  from birth

## 2020-04-05 NOTE — PROVIDER CONTACT NOTE (OTHER) - SITUATION
Pt received supine in SUBWR, reporting 0/10 pain, pre, and 3/10 pain in left Knee post treatment session, ANDREA whipple. Pt returned to bed post treatment session, performed seated knee extension

## 2020-04-06 VITALS
RESPIRATION RATE: 18 BRPM | SYSTOLIC BLOOD PRESSURE: 127 MMHG | HEART RATE: 80 BPM | DIASTOLIC BLOOD PRESSURE: 77 MMHG | OXYGEN SATURATION: 100 % | TEMPERATURE: 98 F

## 2020-04-06 PROCEDURE — 99217: CPT

## 2020-04-06 NOTE — ED CDU PROVIDER DISPOSITION NOTE - CLINICAL COURSE
This is a 30yoM with h/o HIV, GBS with residual b/l LE weakness, presented to the hospital recently for worsened LLE weakness and received TPA, was RR for agonal breathing/ unresponsiveness and had transient intubation;  Pt was evaluated by PMR and was recommended to DC vs. [facility] after pt was ambulating independently per multiple reports. SW reportedly very involved in case to assist in placement but patient advised them that he had a 'place to stay' and was discharged.  PT reports now that he thought he could get into DSS on his own if he called after hours, but was rejected when he tried. He now returns to the ER to get put into placement.  Pt c/o L knee  ' pain' and states it feels different before, but states it is making it hard for him to walk.  Patient placed in observation for PT and placement.  Patient freely walking in ED with cane using phone in waiting room.  Seen by PT who recommended shelter placement.  Seen by , who advised patient to go to DSS today physically for placement.  Patient given bus ticket.  Copies of results given to patient, understands and agrees to proceed.

## 2020-04-06 NOTE — ED CDU PROVIDER DISPOSITION NOTE - ATTENDING CONTRIBUTION TO CARE
pt was in observation awaiting placement  able to walk with cane or walker  safe to dc   agree with care and dispo

## 2020-04-06 NOTE — ED CDU PROVIDER SUBSEQUENT DAY NOTE - PHYSICAL EXAMINATION
Const: Awake, alert and oriented. In no acute distress. Well appearing.  Head: NCAT  Cardiac: Regular rate and regular rhythm. +S1/S2.   Resp: Speaking in full sentences. No evidence of respiratory distress. No wheezes, rales or rhonchi.  MSK: no obvious deformity. Pain with ROM L knee. Pt FWB and ambulatory.   Skin: No rashes, abrasions or lacerations.  Neuro: Awake, alert & oriented x 3. Moves all extremities symmetrically.

## 2020-04-06 NOTE — ED CDU PROVIDER DISPOSITION NOTE - PATIENT PORTAL LINK FT
You can access the FollowMyHealth Patient Portal offered by [facility] by registering at the following website: [email or web address]. By joining [employer]’s FollowMyHealth portal, you will also be able to view your health information using other applications (apps) compatible with our system.

## 2020-04-06 NOTE — ED CDU PROVIDER SUBSEQUENT DAY NOTE - MEDICAL DECISION MAKING DETAILS
Continue CDU intake orders await consult recommendations, lab results, and imaging results.
PT/SW in AM
pending sw placement

## 2020-04-06 NOTE — PROVIDER CONTACT NOTE (OTHER) - SITUATION
Chart reviewed and contents noted. Pt received  without c/o pain.  Pt reports fatigue following treatment.

## 2020-04-06 NOTE — ED ADULT NURSE REASSESSMENT NOTE - NS ED NURSE REASSESS COMMENT FT1
pt received in shift change report at this time, VSS, pt awaiting SW consult, resting in cart in no distress, easily arousable, even and unlabored resps present and observed.

## 2020-04-06 NOTE — PROVIDER CONTACT NOTE (OTHER) - ASSESSMENT
pt required MIN A for transfers + ambulation with RW, 25 ft including turns. Please see completed PT initial evaluation for further details
Bed mobility: Supervision  Sit-to-stand Transfers: CG, requiring verbal cues for hand placement, and to get as close to bed as possible with RW before sitting.   Ambulation: Patient amb 100 feet with RW Min A, unable to fully extend left knee, noted left quad weakness, with increased knee flexion in midstance on left side. Pt slightly unsteady during turn, educated on pacing and increasing arc when turning.
Sit<>Stand Modified Independent, Amb with SAC and Modified Independent for 150 feet with slight unsteadiness, recommend RW.  Up and down 5 steps with 1 rail and SAC and supervision.

## 2020-04-06 NOTE — ED CDU PROVIDER SUBSEQUENT DAY NOTE - HISTORY
PT placed in OBS, has had no acute incidents or complaints while in CDU PT is stable. PT Placed in OBS for SW placement.

## 2020-04-06 NOTE — PROVIDER CONTACT NOTE (OTHER) - BACKGROUND
Pt is a 30 year old male in ED for increased left sided weakness. Please see completed PT initial evaluation for further details
Pt returned and left sitting on edge of bed in no apparent distress and call bell within reach.  Will continue to follow. Nurse aware.
hip flexion bilaterally 2 x 10, and educated on supine ankle pumps, quad sets, and glute sets 10 x throughout the day. Pt verbalized understanding. PT will continue to f/u with pt.

## 2020-04-06 NOTE — ED CDU PROVIDER SUBSEQUENT DAY NOTE - PMH
Asthma    Guillain BarrÃ© syndrome    Guillain-[place]    HIV (human immunodeficiency virus infection)    HIV (human immunodeficiency virus infection)  from birth

## 2020-04-06 NOTE — CHART NOTE - NSCHARTNOTEFT_GEN_A_CORE
SOCIAL WORK NOTE (LATE ENTRY):  DISCUSSED CASE WITH DR ACOSTA AND PA.  PATIENT WANTS TO LEAVE AND GO TO [facility].  PATIENT SEEN AMBULATING AROUND ED WITH HIS CANE AND WALKED OUT TO WAITING ROOM TO USE THE PHONE.  PHYSICAL THERAPY  COMPLETED EVALUATION AND FEELS PATIENT IS AT BASELINE WITH HIS CANE.  AS PER PT, PATIENT WOULD DO BETTER WITH A ROLLING WALKER FOR SLIGHT BALANCING ISSUES.  MET WITH PATIENT TO DISCUSS. PATIENT IS REFUSING THE WALKER AS HE DOES NOT LIKE THEM.  MAX ENCOUARAGEMENT OFFERED AND EMOTIONAL SUPPORT GIVEN. PATIENT CONTINUED TO REFUSE SAME.  PATIENT WANTING TO LEAVE AND REQUESTED TICKETS TO [facility]. SAME GIVEN.

## 2020-04-06 NOTE — ED CDU PROVIDER SUBSEQUENT DAY NOTE - ATTENDING CONTRIBUTION TO CARE
seenwtih acp  awaiting placement   orig pt eval rec sars but pt is ambulating independently in ER  reeval and reassess with cm
Pt. currently still in observation unit. Pt. has been able to ambulate in the ED with a cane.  working on placement for the patient. I, Dr. Jaramillo, performed a face to face bedside interview with this patient regarding history of present illness, review of symptoms and relevant past medical, social and family history.  I completed an independent physical examination.  I have also reviewed the ACP's note(s) and discussed the plan with the ACP.
PT. awake and alert. PT. states that he is feeling better. PT. was able to stand  and ambulate with a cane. 3/5 strength  noted to left lower leg. I, Dr. Jaramillo, performed a face to face bedside interview with this patient regarding history of present illness, review of symptoms and relevant past medical, social and family history.  I completed an independent physical examination.  I have also reviewed the ACP's note(s) and discussed the plan with the ACP.

## 2020-04-06 NOTE — PROVIDER CONTACT NOTE (OTHER) - RECOMMENDATIONS
[facility]
Acute vs. [facility]. Pt is homeless, and is unsteady on feet, requiring min A with ambulation with RW at this time.
Home with RW

## 2020-04-17 ENCOUNTER — EMERGENCY (EMERGENCY)
Facility: HOSPITAL | Age: 30
LOS: 1 days | Discharge: HOME | End: 2020-04-17
Attending: EMERGENCY MEDICINE
Payer: COMMERCIAL

## 2020-04-17 VITALS
WEIGHT: 199.96 LBS | OXYGEN SATURATION: 100 % | SYSTOLIC BLOOD PRESSURE: 121 MMHG | HEART RATE: 77 BPM | HEIGHT: 70 IN | RESPIRATION RATE: 20 BRPM | DIASTOLIC BLOOD PRESSURE: 82 MMHG | TEMPERATURE: 98 F

## 2020-04-17 DIAGNOSIS — Z98.890 OTHER SPECIFIED POSTPROCEDURAL STATES: Chronic | ICD-10-CM

## 2020-04-17 PROCEDURE — 99283 EMERGENCY DEPT VISIT LOW MDM: CPT

## 2020-04-17 PROCEDURE — 99284 EMERGENCY DEPT VISIT MOD MDM: CPT

## 2020-04-17 PROCEDURE — 93005 ELECTROCARDIOGRAM TRACING: CPT

## 2020-04-17 PROCEDURE — 93010 ELECTROCARDIOGRAM REPORT: CPT

## 2020-04-17 RX ORDER — ACETAMINOPHEN 500 MG
650 TABLET ORAL ONCE
Refills: 0 | Status: COMPLETED | OUTPATIENT
Start: 2020-04-17 | End: 2020-04-17

## 2020-04-17 RX ORDER — FAMOTIDINE 10 MG/ML
20 INJECTION INTRAVENOUS DAILY
Refills: 0 | Status: DISCONTINUED | OUTPATIENT
Start: 2020-04-17 | End: 2020-04-22

## 2020-04-17 NOTE — ED STATDOCS - PHYSICAL EXAMINATION
Head: atraumatic, normocephalic  Face: atraumatic, no crepitus no orbiral/maxillary/mandibular ttp  throat: uvula midline no exudates  eyes: perrla eomi  heart: rrr s1s2  lungs: ctab  abd: soft, nt nd +bs no rebound/guarding no cva ttp  skin: warm  LE: no swelling, no calf ttp  back: no midline cervical/thoracic/lumbar ttp Head: atraumatic, normocephalic  eyes: perrla eomi  heart: rrr s1s2  lungs: ctab  abd: soft, nt nd +bs no rebound/guarding no cva ttp  skin: warm  LE: no swelling, no calf ttp  back: no midline cervical/thoracic/lumbar ttp

## 2020-04-17 NOTE — ED STATDOCS - PATIENT PORTAL LINK FT
You can access the FollowMyHealth Patient Portal offered by [facility] by registering at the following website: [email or web address]. By joining [employer]’s FollowMyHealth portal, you will also be able to view your health information using other applications (apps) compatible with our system.

## 2020-04-17 NOTE — ED STATDOCS - NSFOLLOWUPINSTRUCTIONS_ED_ALL_ED_FT
return to the ED if symptoms worsen, fever/chills, nausea/vomiting, chest pain shortness of breath. Follow up with PMD within 1 week.

## 2020-04-17 NOTE — ED STATDOCS - OBJECTIVE STATEMENT
29 y/o male with PMHx of Asthma, HIV, and Guillain-Barré syndrome presents to ED c/o chest discomfort. Patient reports he was having tightness in chest intermittently for 2 days, difficulty breathing, and headache. States while in ED, he burped and the chest tightness has since subsided.     Denies f/c/n/v/cp/sob/palpitations/cough/rash/dizziness/abd.pain/d/c/dysuria/hematuria/surgeries/weakness/fatigue/Hx of DVT or PE  Allergy to Seecor 29 y/o male with PMHx of Asthma, HIV, and Guillain-Barré syndrome presents to ED c/o chest discomfort. Patient reports he was having tightness in chest intermittently for 2 days lasting seconds, difficulty breathing, and headache. States while in ED, he burped and the chest tightness has since subsided.  no fmhx of sudden death or acs.     Denies f/c/n/v/palpitations/cough/rash/dizziness/abd.pain/d/c/dysuria/hematuria/surgeries/weakness/fatigue/Hx of DVT or PE  Allergy to Seecor

## 2020-04-17 NOTE — ED STATDOCS - PROGRESS NOTE DETAILS
pt doesn not want to wait for cxr would like to leave asking for a cab home will dc VS table lungs ctab ekg wnl will dc

## 2020-04-17 NOTE — ED ADULT TRIAGE NOTE - CHIEF COMPLAINT QUOTE
pt arrive by ambulance with c/o "chest bothering me, headache, and weak", symptoms x2 days. as per EMS pt 100% RA

## 2020-04-17 NOTE — ED STATDOCS - PMH
Asthma    Guillain BarrÃ© syndrome    Guillain-[place]    HIV (human immunodeficiency virus infection)    HIV (human immunodeficiency virus infection)  from birth

## 2020-06-05 NOTE — CONSULT NOTE ADULT - PROVIDER SPECIALTY LIST ADULT
Patient given discharge instructions per provider, patient verbalized understanding and ambulatory from ED to home.
Critical Care
Rehab Medicine
Cardiology
Neurology

## 2020-08-14 ENCOUNTER — EMERGENCY (EMERGENCY)
Facility: HOSPITAL | Age: 30
LOS: 0 days | Discharge: HOME | End: 2020-08-14
Attending: EMERGENCY MEDICINE | Admitting: FAMILY MEDICINE
Payer: MEDICAID

## 2020-08-14 VITALS
RESPIRATION RATE: 17 BRPM | HEART RATE: 71 BPM | HEIGHT: 71 IN | DIASTOLIC BLOOD PRESSURE: 72 MMHG | WEIGHT: 179.02 LBS | OXYGEN SATURATION: 100 % | TEMPERATURE: 99 F | SYSTOLIC BLOOD PRESSURE: 129 MMHG

## 2020-08-14 VITALS
OXYGEN SATURATION: 100 % | RESPIRATION RATE: 16 BRPM | DIASTOLIC BLOOD PRESSURE: 75 MMHG | HEART RATE: 65 BPM | SYSTOLIC BLOOD PRESSURE: 127 MMHG | TEMPERATURE: 98 F

## 2020-08-14 DIAGNOSIS — Z21 ASYMPTOMATIC HUMAN IMMUNODEFICIENCY VIRUS [HIV] INFECTION STATUS: ICD-10-CM

## 2020-08-14 DIAGNOSIS — Z98.890 OTHER SPECIFIED POSTPROCEDURAL STATES: Chronic | ICD-10-CM

## 2020-08-14 DIAGNOSIS — G61.0 GUILLAIN-BARRE SYNDROME: ICD-10-CM

## 2020-08-14 DIAGNOSIS — J45.909 UNSPECIFIED ASTHMA, UNCOMPLICATED: ICD-10-CM

## 2020-08-14 DIAGNOSIS — M54.9 DORSALGIA, UNSPECIFIED: ICD-10-CM

## 2020-08-14 DIAGNOSIS — J18.9 PNEUMONIA, UNSPECIFIED ORGANISM: ICD-10-CM

## 2020-08-14 DIAGNOSIS — F17.200 NICOTINE DEPENDENCE, UNSPECIFIED, UNCOMPLICATED: ICD-10-CM

## 2020-08-14 DIAGNOSIS — Z88.1 ALLERGY STATUS TO OTHER ANTIBIOTIC AGENTS STATUS: ICD-10-CM

## 2020-08-14 DIAGNOSIS — R07.9 CHEST PAIN, UNSPECIFIED: ICD-10-CM

## 2020-08-14 LAB
ALBUMIN SERPL ELPH-MCNC: 3.3 G/DL — SIGNIFICANT CHANGE UP (ref 3.3–5)
ALP SERPL-CCNC: 60 U/L — SIGNIFICANT CHANGE UP (ref 40–120)
ALT FLD-CCNC: 26 U/L — SIGNIFICANT CHANGE UP (ref 12–78)
ANION GAP SERPL CALC-SCNC: 4 MMOL/L — LOW (ref 5–17)
AST SERPL-CCNC: 38 U/L — HIGH (ref 15–37)
BASOPHILS # BLD AUTO: 0.02 K/UL — SIGNIFICANT CHANGE UP (ref 0–0.2)
BASOPHILS NFR BLD AUTO: 0.4 % — SIGNIFICANT CHANGE UP (ref 0–2)
BILIRUB SERPL-MCNC: 0.3 MG/DL — SIGNIFICANT CHANGE UP (ref 0.2–1.2)
BUN SERPL-MCNC: 11 MG/DL — SIGNIFICANT CHANGE UP (ref 7–23)
CALCIUM SERPL-MCNC: 8.5 MG/DL — SIGNIFICANT CHANGE UP (ref 8.5–10.1)
CHLORIDE SERPL-SCNC: 107 MMOL/L — SIGNIFICANT CHANGE UP (ref 96–108)
CO2 SERPL-SCNC: 27 MMOL/L — SIGNIFICANT CHANGE UP (ref 22–31)
CREAT SERPL-MCNC: 0.98 MG/DL — SIGNIFICANT CHANGE UP (ref 0.5–1.3)
EOSINOPHIL # BLD AUTO: 0.12 K/UL — SIGNIFICANT CHANGE UP (ref 0–0.5)
EOSINOPHIL NFR BLD AUTO: 2.6 % — SIGNIFICANT CHANGE UP (ref 0–6)
GLUCOSE SERPL-MCNC: 86 MG/DL — SIGNIFICANT CHANGE UP (ref 70–99)
HCT VFR BLD CALC: 37.1 % — LOW (ref 39–50)
HGB BLD-MCNC: 12.6 G/DL — LOW (ref 13–17)
IMM GRANULOCYTES NFR BLD AUTO: 0.2 % — SIGNIFICANT CHANGE UP (ref 0–1.5)
LACTATE SERPL-SCNC: 1.2 MMOL/L — SIGNIFICANT CHANGE UP (ref 0.7–2)
LIDOCAIN IGE QN: 109 U/L — SIGNIFICANT CHANGE UP (ref 73–393)
LYMPHOCYTES # BLD AUTO: 2.98 K/UL — SIGNIFICANT CHANGE UP (ref 1–3.3)
LYMPHOCYTES # BLD AUTO: 63.9 % — HIGH (ref 13–44)
MAGNESIUM SERPL-MCNC: 1.1 MG/DL — LOW (ref 1.6–2.6)
MCHC RBC-ENTMCNC: 28.8 PG — SIGNIFICANT CHANGE UP (ref 27–34)
MCHC RBC-ENTMCNC: 34 GM/DL — SIGNIFICANT CHANGE UP (ref 32–36)
MCV RBC AUTO: 84.9 FL — SIGNIFICANT CHANGE UP (ref 80–100)
MONOCYTES # BLD AUTO: 0.48 K/UL — SIGNIFICANT CHANGE UP (ref 0–0.9)
MONOCYTES NFR BLD AUTO: 10.3 % — SIGNIFICANT CHANGE UP (ref 2–14)
NEUTROPHILS # BLD AUTO: 1.05 K/UL — LOW (ref 1.8–7.4)
NEUTROPHILS NFR BLD AUTO: 22.6 % — LOW (ref 43–77)
PCP SPEC-MCNC: SIGNIFICANT CHANGE UP
PLATELET # BLD AUTO: 192 K/UL — SIGNIFICANT CHANGE UP (ref 150–400)
POTASSIUM SERPL-MCNC: 4.4 MMOL/L — SIGNIFICANT CHANGE UP (ref 3.5–5.3)
POTASSIUM SERPL-SCNC: 4.4 MMOL/L — SIGNIFICANT CHANGE UP (ref 3.5–5.3)
PROT SERPL-MCNC: 8.2 GM/DL — SIGNIFICANT CHANGE UP (ref 6–8.3)
RBC # BLD: 4.37 M/UL — SIGNIFICANT CHANGE UP (ref 4.2–5.8)
RBC # FLD: 12.5 % — SIGNIFICANT CHANGE UP (ref 10.3–14.5)
SARS-COV-2 RNA SPEC QL NAA+PROBE: SIGNIFICANT CHANGE UP
SODIUM SERPL-SCNC: 138 MMOL/L — SIGNIFICANT CHANGE UP (ref 135–145)
TROPONIN I SERPL-MCNC: <0.015 NG/ML — SIGNIFICANT CHANGE UP (ref 0.01–0.04)
TROPONIN I SERPL-MCNC: <0.015 NG/ML — SIGNIFICANT CHANGE UP (ref 0.01–0.04)
WBC # BLD: 4.66 K/UL — SIGNIFICANT CHANGE UP (ref 3.8–10.5)
WBC # FLD AUTO: 4.66 K/UL — SIGNIFICANT CHANGE UP (ref 3.8–10.5)

## 2020-08-14 PROCEDURE — 80307 DRUG TEST PRSMV CHEM ANLYZR: CPT

## 2020-08-14 PROCEDURE — U0003: CPT

## 2020-08-14 PROCEDURE — 96365 THER/PROPH/DIAG IV INF INIT: CPT

## 2020-08-14 PROCEDURE — 71045 X-RAY EXAM CHEST 1 VIEW: CPT | Mod: 26

## 2020-08-14 PROCEDURE — 71250 CT THORAX DX C-: CPT | Mod: 26

## 2020-08-14 PROCEDURE — 71250 CT THORAX DX C-: CPT

## 2020-08-14 PROCEDURE — 83605 ASSAY OF LACTIC ACID: CPT

## 2020-08-14 PROCEDURE — 83690 ASSAY OF LIPASE: CPT

## 2020-08-14 PROCEDURE — 96367 TX/PROPH/DG ADDL SEQ IV INF: CPT

## 2020-08-14 PROCEDURE — 93005 ELECTROCARDIOGRAM TRACING: CPT

## 2020-08-14 PROCEDURE — 87040 BLOOD CULTURE FOR BACTERIA: CPT

## 2020-08-14 PROCEDURE — 96361 HYDRATE IV INFUSION ADD-ON: CPT

## 2020-08-14 PROCEDURE — 99284 EMERGENCY DEPT VISIT MOD MDM: CPT | Mod: 25

## 2020-08-14 PROCEDURE — 99284 EMERGENCY DEPT VISIT MOD MDM: CPT

## 2020-08-14 PROCEDURE — 36415 COLL VENOUS BLD VENIPUNCTURE: CPT

## 2020-08-14 PROCEDURE — 99285 EMERGENCY DEPT VISIT HI MDM: CPT | Mod: 25

## 2020-08-14 PROCEDURE — 96375 TX/PRO/DX INJ NEW DRUG ADDON: CPT

## 2020-08-14 PROCEDURE — 84484 ASSAY OF TROPONIN QUANT: CPT

## 2020-08-14 PROCEDURE — 80053 COMPREHEN METABOLIC PANEL: CPT

## 2020-08-14 PROCEDURE — 85025 COMPLETE CBC W/AUTO DIFF WBC: CPT

## 2020-08-14 PROCEDURE — 71045 X-RAY EXAM CHEST 1 VIEW: CPT

## 2020-08-14 PROCEDURE — 83735 ASSAY OF MAGNESIUM: CPT

## 2020-08-14 RX ORDER — DEXAMETHASONE 0.5 MG/5ML
10 ELIXIR ORAL ONCE
Refills: 0 | Status: COMPLETED | OUTPATIENT
Start: 2020-08-14 | End: 2020-08-14

## 2020-08-14 RX ORDER — VANCOMYCIN HCL 1 G
1000 VIAL (EA) INTRAVENOUS ONCE
Refills: 0 | Status: COMPLETED | OUTPATIENT
Start: 2020-08-14 | End: 2020-08-14

## 2020-08-14 RX ORDER — KETOROLAC TROMETHAMINE 30 MG/ML
30 SYRINGE (ML) INJECTION ONCE
Refills: 0 | Status: DISCONTINUED | OUTPATIENT
Start: 2020-08-14 | End: 2020-08-14

## 2020-08-14 RX ORDER — OXYCODONE AND ACETAMINOPHEN 5; 325 MG/1; MG/1
2 TABLET ORAL ONCE
Refills: 0 | Status: DISCONTINUED | OUTPATIENT
Start: 2020-08-14 | End: 2020-08-14

## 2020-08-14 RX ORDER — PIPERACILLIN AND TAZOBACTAM 4; .5 G/20ML; G/20ML
3.38 INJECTION, POWDER, LYOPHILIZED, FOR SOLUTION INTRAVENOUS ONCE
Refills: 0 | Status: COMPLETED | OUTPATIENT
Start: 2020-08-14 | End: 2020-08-14

## 2020-08-14 RX ORDER — INDOMETHACIN 50 MG
25 CAPSULE ORAL ONCE
Refills: 0 | Status: COMPLETED | OUTPATIENT
Start: 2020-08-14 | End: 2020-08-14

## 2020-08-14 RX ORDER — OXYCODONE HYDROCHLORIDE 5 MG/1
10 TABLET ORAL ONCE
Refills: 0 | Status: DISCONTINUED | OUTPATIENT
Start: 2020-08-14 | End: 2020-08-14

## 2020-08-14 RX ORDER — MAGNESIUM SULFATE 500 MG/ML
2 VIAL (ML) INJECTION ONCE
Refills: 0 | Status: COMPLETED | OUTPATIENT
Start: 2020-08-14 | End: 2020-08-14

## 2020-08-14 RX ORDER — SODIUM CHLORIDE 9 MG/ML
1000 INJECTION INTRAMUSCULAR; INTRAVENOUS; SUBCUTANEOUS ONCE
Refills: 0 | Status: COMPLETED | OUTPATIENT
Start: 2020-08-14 | End: 2020-08-14

## 2020-08-14 RX ADMIN — Medication 1000 MILLIGRAM(S): at 04:00

## 2020-08-14 RX ADMIN — PIPERACILLIN AND TAZOBACTAM 3.38 GRAM(S): 4; .5 INJECTION, POWDER, LYOPHILIZED, FOR SOLUTION INTRAVENOUS at 02:58

## 2020-08-14 RX ADMIN — OXYCODONE AND ACETAMINOPHEN 2 TABLET(S): 5; 325 TABLET ORAL at 04:03

## 2020-08-14 RX ADMIN — SODIUM CHLORIDE 1000 MILLILITER(S): 9 INJECTION INTRAMUSCULAR; INTRAVENOUS; SUBCUTANEOUS at 01:42

## 2020-08-14 RX ADMIN — SODIUM CHLORIDE 1000 MILLILITER(S): 9 INJECTION INTRAMUSCULAR; INTRAVENOUS; SUBCUTANEOUS at 02:42

## 2020-08-14 RX ADMIN — Medication 10 MILLIGRAM(S): at 01:15

## 2020-08-14 RX ADMIN — Medication 30 MILLIGRAM(S): at 05:48

## 2020-08-14 RX ADMIN — OXYCODONE AND ACETAMINOPHEN 2 TABLET(S): 5; 325 TABLET ORAL at 04:51

## 2020-08-14 RX ADMIN — PIPERACILLIN AND TAZOBACTAM 200 GRAM(S): 4; .5 INJECTION, POWDER, LYOPHILIZED, FOR SOLUTION INTRAVENOUS at 02:28

## 2020-08-14 RX ADMIN — Medication 1 TABLET(S): at 01:13

## 2020-08-14 RX ADMIN — Medication 250 MILLIGRAM(S): at 03:00

## 2020-08-14 RX ADMIN — Medication 50 GRAM(S): at 01:42

## 2020-08-14 RX ADMIN — Medication 25 MILLIGRAM(S): at 01:13

## 2020-08-14 RX ADMIN — Medication 25 MILLIGRAM(S): at 02:18

## 2020-08-14 RX ADMIN — Medication 2 GRAM(S): at 02:42

## 2020-08-14 NOTE — ED PROVIDER NOTE - PMH
Asthma    Guillain BarrÃ© syndrome    Guillain-[place]    HIV (human immunodeficiency virus infection)    HIV (human immunodeficiency virus infection)  from birth

## 2020-08-14 NOTE — ED PROVIDER NOTE - CARE PLAN
Principal Discharge DX:	HCAP (healthcare-associated pneumonia)  Secondary Diagnosis:	HIV infection, unspecified symptom status

## 2020-08-14 NOTE — ED ADULT NURSE REASSESSMENT NOTE - NS ED NURSE REASSESS COMMENT FT1
delay in antibiotic therapy due to patient being difficult to obtain IV access/blood cultures/lactate. Dr Barakat made aware

## 2020-08-14 NOTE — ED PROVIDER NOTE - NSFOLLOWUPCLINICS_GEN_ALL_ED_FT
A Cardiologist  Cardiology  .  [place]   Phone:   Fax:   Follow Up Time:     A Family Medicine Doctor  Family Medicine  .  [place]   Phone:   Fax:   Follow Up Time:     A Pulmonologist  Pulmonary Medicine  .  [place]   Phone:   Fax:   Follow Up Time:

## 2020-08-14 NOTE — CONSULT NOTE ADULT - SUBJECTIVE AND OBJECTIVE BOX
29 yo male with PMH of HIV on genvoya, h/o jose elias-barre, asthma presents to ED with complaint of CP. Pt states he has had chest pain intermittently since March when he was in custodial. Pain is over sternum and reproducible to palpation. Pt was seen at [facility] on 3/23 at the time and work up negative including CTA of the chest. He was discharged home and then on 4/6/20 he again presented with chest pain and cxr at the time showed LLL PNA and was treated with abx. He was doing better until about 2 weeks ago when he again developed the chest pain. He went to the ED at [facility] and again had a CTA of chest which showed ground glass opacities. He was admitted and placed on levaquin and flagyl. He was seen by ID Dr. De La O, pulm Dr. Bacon, neuro Dr. Rock and GI Dr. Siu at the time as he complained of a hoarse voice. Procalcitonin was negative. He also had an echocardiogram which showed preserved EF of 55-60% with no significant valvular abnormalities and no wall motion abnormalities. He had a negative strep, mono and viral panel. He was discharged home on levaquin and flagyl which he took but did not complete last 2 doses. He then on 8/11 presented to [facility] ER again. He again had a CTA of chest which showed tree in bud opacities. He was started on rochepin for PNA. He was again admitted to the hospital. Procalcitonin again negative. He was seen by cardio Dr. Verdugo and had another echo performed again which showed EF of 55-60% with no WMA. He was seen by ID at [facility] Dr. Zapien and was taken off abx. CD4 count was 456. Viral load is still pending but patient states everytime he checks it is undetectable. He does follow ID a [facility] Dr. Castano. Each time in the hospital COVID testing has been negative at each location. Procalcitonin has been normal each time. CP thought to be secondary to costochondritis. Opportunistic infection unlikely as CD4 count 456. Pt has routine PPDs that have been negative. He has no fevers and no cough. No leukocytosis. Chest pain is reproducible to palpation. He is already scheduled for a outpatient stress test with Dr. Verdugo at [facility]. On 8/2 at [facility] pt drug screen also positive for cocaine and opiates.     PMH: HIV, Guillan [place], asthma  PSxH: none  FH: pt is adopted  SH: +drug use, cocaine, THC, +tobacco use, + etoh use  Allergies: ceclor 29 yo male with PMH of HIV on genvoya, h/o jose elias-barre, asthma presents to ED with complaint of CP. Pt states he has had chest pain intermittently since March when he was in longterm. Pain is over sternum and reproducible to palpation. Pt was seen at [facility] on 3/23 at the time and work up negative including CTA of the chest. He was discharged home and then on 4/6/20 he again presented with chest pain and cxr at the time showed LLL PNA and was treated with abx. He was doing better until about 2 weeks ago when he again developed the chest pain. He went to the ED at [facility] and again had a CTA of chest which showed ground glass opacities. He was admitted and placed on levaquin and flagyl. He was seen by ID Dr. De La O, pulm Dr. Bacon, neuro Dr. Rock and GI Dr. Siu at the time as he complained of a hoarse voice. Procalcitonin was negative. He also had an echocardiogram which showed preserved EF of 55-60% with no significant valvular abnormalities and no wall motion abnormalities. He had a negative strep, mono and viral panel. He was discharged home on levaquin and flagyl which he took but did not complete last 2 doses. He then on 8/11 presented to [facility] ER again. He again had a CTA of chest which showed tree in bud opacities. He was started on rochepin for PNA. He was again admitted to the hospital. Procalcitonin again negative. He was seen by cardio Dr. Verdugo and had another echo performed again which showed EF of 55-60% with no WMA. He was seen by ID at [facility] Dr. Zapien and was taken off abx. CD4 count was 456. Viral load is still pending but patient states everytime he checks it is undetectable. He does follow ID a [facility] Dr. Castano. Each time in the hospital COVID testing has been negative at each location. Procalcitonin has been normal each time. CP thought to be secondary to costochondritis. Opportunistic infection unlikely as CD4 count 456. Pt has routine PPDs that have been negative. He has no fevers and no cough. No leukocytosis. No SOB. Chest pain is reproducible to palpation. He is already scheduled for a outpatient stress test with Dr. Verdugo at [facility]. On 8/2 at [facility] pt drug screen also positive for cocaine and opiates.     PMH: HIV, Guillan [place], asthma  PSxH: none  FH: pt is adopted  SH: +drug use, cocaine, THC, +tobacco use, + etoh use  Allergies: daryl 31 yo male with PMH of HIV on genvoya, h/o jose elias-barre, asthma presents to ED with complaint of CP. Pt states he has had chest pain intermittently since March when he was in penitentiary. Pain is over sternum and reproducible to palpation. Pt was seen at [facility] on 3/23 at the time and work up negative including CTA of the chest. He was discharged home and then on 4/6/20 he again presented with chest pain and cxr at the time showed LLL PNA and was treated with abx. He was doing better until about 2 weeks ago when he again developed the chest pain. He went to the ED at [facility] and again had a CTA of chest which showed ground glass opacities. He was admitted and placed on levaquin and flagyl. He was seen by ID Dr. De La O, pulm Dr. Bacon, neuro Dr. Rock and GI Dr. Siu at the time as he complained of a hoarse voice. Procalcitonin was negative. He also had an echocardiogram which showed preserved EF of 55-60% with no significant valvular abnormalities and no wall motion abnormalities. He had a negative strep, mono and viral panel. He was discharged home on levaquin and flagyl which he took but did not complete last 2 doses. He then on 8/11 presented to [facility] ER again. He again had a CTA of chest which showed tree in bud opacities. He was started on rochepin for PNA. He was again admitted to the hospital. Procalcitonin again negative. He was seen by cardio Dr. Verdugo and had another echo performed again which showed EF of 55-60% with no WMA. He was seen by ID at [facility] Dr. Zapien and was taken off abx. CD4 count was 456. Viral load is still pending but patient states everytime he checks it is undetectable. He does follow ID a [facility] Dr. Castano. Each time in the hospital COVID testing has been negative at each location. Procalcitonin has been normal each time. CP thought to be secondary to costochondritis. Opportunistic infection unlikely as CD4 count 456. Pt has routine PPDs that have been negative. He has no fevers and no cough. No leukocytosis. No SOB. Chest pain is reproducible to palpation. He is already scheduled for a outpatient stress test with Dr. Verdugo at [facility]. On 8/2 at [facility] pt drug screen also positive for cocaine and opiates. Denies fevers, chills, abd pain, N/V/D, dyuria, dizziness, syncope, diaphoresis.    PMH: HIV, Guillan [place], asthma  PSxH: none  FH: pt is adopted  SH: +drug use, cocaine, THC, +tobacco use, + etoh use  Allergies: daryl

## 2020-08-14 NOTE — ED ADULT NURSE NOTE - CHPI ED NUR SYMPTOMS NEG
no fever/no syncope/no congestion/no nausea/no shortness of breath/no vomiting/no chills/no dizziness/no diaphoresis

## 2020-08-14 NOTE — ED PROVIDER NOTE - PROGRESS NOTE DETAILS
Dr. Polanco hospitalist came to admit patient but was able to obtain records from [facility] and [facility] and there are no changes of the opacities on CT and chest pain is reproducible.  Patient has all outpatient appointments set up with cardiology and pulmonology from his discharge.  He had CD4 counts greater than 450 and has ID specialist at [place].  Patient agreeable and wishes to go home as pain is now controlled.  Dr. Polanco to dictate medical consult and patient to be discharged following same plan that was set by [facility] this week. Dr. Polanco completed consult.  Patient agreeable to follow up with all outpatient appointments and will continue all current meds.

## 2020-08-14 NOTE — ED PROVIDER NOTE - OBJECTIVE STATEMENT
Pt. is a 31 yo M with a hx of HIV, Guillain [place] syndrome, hyperlipidemia, mood disorder, tobacco dependence, reactive airway disease presents with lower mid sternal chest pain described as dull soreness X 3-4 months.  Patient states he has been to [facility], [facility] and [facility] for this same chest pain over the past few months.  Patient was just at [facility] where he was admitted, told he had pericarditis being treated with NSAIDs and had tree-in-bud appearance in periphery of lungs on a CT angiogram of the chest without PE, pericardial effusion or pleural effusion. Patient was discharged with albuterol, ibuprofen, and levofloxacin which patient has been taking. Patient denies fever, cough, shortness of breath today but had all of these symptoms last week.  Pain in chest today radiates to the back as it has for about 3-4 months now.  Patient able to eat and drink without pain.  Smoking cigarettes makes the pain worse, but patient states he hasn't smoked in a few days.

## 2020-08-14 NOTE — ED PROVIDER NOTE - PATIENT PORTAL LINK FT
You can access the FollowMyHealth Patient Portal offered by [facility] by registering at the following website: [email or web address]. By joining [employer]’s FollowMyHealth portal, you will also be able to view your health information using other applications (apps) compatible with our system.

## 2020-08-14 NOTE — CONSULT NOTE ADULT - ASSESSMENT
29 yo male with PMH of HIV on genvoya, h/o jose elias-barre, asthma presents to ED with complaint of CP.    Chest pain is atypical and reproducible to palpation - not ACS  cardiac enzyme negative  EKG with early repol similar to EKGs from [facility] and [facility]  pt with echo x2 recently with LVEF 55-60% and no WMA  CTA chest x3 reviewed from here, [facility] and [facility] - likely atypical viral PNA  COVID testing negative multiple times  on levaquin and flagyl  doubt opportunistic infection with CD4 count >400 at [facility]  no need for further abx at this time as pt is asymptomatic  recommend repeat imaging of chest in 6 weeks with PCP at [facility] in [facility] Dr. Kim  f/u with ID at [facility] Dr. Castano  take NSAIDs for pain for costochondritis  follow up with cardio Dr. Verdugo for stress test as scheduled  continue genvoya for HIV    Pt does not require admission at this time. Ok for d/c home from ED. d/w Dr. Barakat 29 yo male with PMH of HIV on genvoya, h/o jose elias-barre, asthma presents to ED with complaint of CP.    Chest pain is atypical and reproducible to palpation - not ACS  cardiac enzyme negative  EKG with early repol similar to EKGs from [facility] and [facility]  pt with echo x2 recently with LVEF 55-60% and no WMA  CTA chest x3 reviewed from here, [facility] and [facility] - likely atypical viral PNA  COVID testing negative multiple times  on levaquin and flagyl  doubt opportunistic infection with CD4 count >400 at [facility]  no need for further abx at this time as pt is asymptomatic  recommend repeat imaging of chest in 6 weeks with PCP at [facility] clinic in [facility] Dr. Kim  f/u with ID at [facility] Dr. Castano  take NSAIDs for pain for costochondritis, will give dose of toradol now  follow up with cardio Dr. Verdugo for stress test as scheduled  continue genvoya for HIV    Pt does not require admission at this time. Ok for d/c home from ED. d/w Dr. Barakat

## 2020-08-14 NOTE — ED PROVIDER NOTE - NSFOLLOWUPINSTRUCTIONS_ED_ALL_ED_FT
Continue all meds as prescribed. See your cardiologist, pulmonologist and infectious disease doctors as scheduled.         PNEUMONIA - General Information     Pneumonia    WHAT YOU NEED TO KNOW:    What is pneumonia? Pneumonia is an infection in your lungs caused by bacteria, viruses, fungi, or parasites. You can become infected if you come in contact with someone who is sick. You can get pneumonia if you recently had surgery or needed a ventilator to help you breathe. Pneumonia can also be caused by accidentally inhaling saliva or small pieces of food. Pneumonia may cause mild symptoms, or it can be severe and life-threatening.The Lungs         What increases my risk for pneumonia?     A cold or the flu      Health conditions, such as heart or lung disease      A weakened immune system caused by HIV, cancer, or steroid use      Recent hospitalization      Smoking      Excess alcohol use      Older age    What are the signs and symptoms of pneumonia?     Fever or chills      Cough      Shortness of breath or rapid breathing      Chest pain when you cough or breathe deeply      Headache      Vomiting      Fatigue or confusion    How is pneumonia diagnosed? Your healthcare provider will listen to your lungs. Tell him or her if you have other health conditions. Give your provider a complete list of all medicines you have taken recently. You may need any of the following:    Blood tests may show signs of an infection or the bacteria causing your pneumonia. Blood tests can also show how much oxygen is in your blood.      A chest x-ray may show signs of infection in your lungs.      Pulse oximetry measures the amount of oxygen in your blood.      A mucus sample is collected and tested for the germ that is causing your pneumonia. It can help your healthcare provider choose the best medicine to treat the infection.    How is pneumonia treated?     Antibiotics treat pneumonia caused by bacteria.      Acetaminophen decreases pain and fever. It is available without a doctor's order. Ask how much to take and how often to take it. Follow directions. Read the labels of all other medicines you are using to see if they also contain acetaminophen, or ask your doctor or pharmacist. Acetaminophen can cause liver damage if not taken correctly. Do not use more than 4 grams (4,000 milligrams) total of acetaminophen in one day.       NSAIDs, such as ibuprofen, help decrease swelling, pain, and fever. This medicine is available with or without a doctor's order. NSAIDs can cause stomach bleeding or kidney problems in certain people. If you take blood thinner medicine, always ask your healthcare provider if NSAIDs are safe for you. Always read the medicine label and follow directions.      Airway clearance techniques are exercises to help remove mucus so you can breathe more easily. Your healthcare provider will show you how to do the exercises. These exercises may be used along with machines or devices to help decrease your symptoms.      Respiratory support is given to help you breathe. You may receive oxygen to increase the level of oxygen in your blood. You may also need a machine to help you breathe.    How can I manage my symptoms?     Rest as needed. Rest often throughout the day. Alternate times of activity with times of rest.      Drink liquids as directed. Ask how much liquid to drink each day and which liquids are best for you. Liquids help thin your mucus, which may make it easier for you to cough it up.      Do not smoke. Avoid secondhand smoke. Smoking increases your risk for pneumonia. Smoking also makes it harder for you to get better after you have had pneumonia. Ask your healthcare provider for information if you need help to quit smoking.      Limit alcohol. Women should limit alcohol to 1 drink a day. Men should limit alcohol to 2 drinks a day. A drink of alcohol is 12 ounces of beer, 5 ounces of wine, or 1½ ounces of liquor.      Use a cool mist humidifier. A humidifier will help increase air moisture in your home. This may make it easier for you to breathe and help decrease your cough.      Keep your head elevated. You may be able to breathe better if you lie down with the head of your bed up.    How can I prevent pneumonia?     Wash your hands often. Use soap and water every time you wash your hands. Rub your soapy hands together, lacing your fingers. Use the fingers of one hand to scrub under the nails of the other hand. Wash for at least 20 seconds. Rinse with warm, running water for several seconds. Then dry your hands with a clean towel or paper towel. Use hand  that contains alcohol if soap and water are not available. Do not touch your eyes, nose, or mouth without washing your hands first. Handwashing           Cover a sneeze or cough. Use a tissue that covers your mouth and nose. Throw the tissue away in a trash can right away. Use the bend of your arm if a tissue is not available. Wash your hands well with soap and water or use a hand . Do not stand close to anyone who is sneezing or coughing.      Stay away from others until you are well. Do not go to work or other activities. Wait until your symptoms are gone or your healthcare provider says it is okay to return.      Ask about vaccines you may need. You may need a vaccine to help prevent pneumonia. Get an influenza (flu) vaccine every year as soon as recommended, usually in September or October. Flu viruses change, so it is important to get a yearly flu vaccine.           When should I seek immediate care?     You cough up blood.      Your heart beats more than 100 beats in 1 minute.      You are very tired, confused, and cannot think clearly.      You have chest pain or trouble breathing.      Your lips or fingernails turn gray or blue.    When should I call my doctor?     Your symptoms are the same or get worse 48 hours after you start antibiotics.      Your fever is not below 99°F (37.2°C) 48 hours after you start antibiotics.      You have a fever higher than 101°F (38.3°C).      You cannot eat, or you have loss of appetite, nausea, or are vomiting.      You have questions or concerns about your condition or care.    CARE AGREEMENT:    You have the right to help plan your care. Learn about your health condition and how it may be treated. Discuss treatment options with your healthcare providers to decide what care you want to receive. You always have the right to refuse treatment.

## 2020-08-14 NOTE — ED ADULT NURSE NOTE - CHIEF COMPLAINT QUOTE
Pt BIB [facility] co 10/10 mid chest pain radiating to his back. [facility], told to return to ED if pain continued or worsened. Pt with previous medical history of stroke in April, defecits unknown. PT awake and alert at this time, no numbeness/no tingling, no shortness of breath no diaphoresis noted at this time,

## 2020-08-14 NOTE — ED PROVIDER NOTE - CLINICAL SUMMARY MEDICAL DECISION MAKING FREE TEXT BOX
HIV patient with chest pain and diffuse opacities consistent with pneumonia.  Patient recently hospitalized and immunocompromised so treated for HCAP and admitted to med/surg.

## 2020-08-14 NOTE — ED ADULT NURSE NOTE - PMH
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

## 2020-08-14 NOTE — ED ADULT TRIAGE NOTE - CHIEF COMPLAINT QUOTE
Pt BIB EMs co 10/10 mid chest pain radiating to his back. Pt at [facility] earlier today and was diagnosed with pericarditis, told to return to ED if pain continued or worsened. Pt with previous medical history of stroke in April, defecits unknown. PT awake and alert at this time, no numbeness/no tingling, no shortness of breath no diaphoresis noted at this time,

## 2020-08-14 NOTE — ED ADULT NURSE REASSESSMENT NOTE - NS ED NURSE REASSESS COMMENT FT1
pt c/o chest pain, 2 percocets ordered. Dr Barakat made aware. No EKG/bloodwork ordered now as per dr barakat

## 2020-08-14 NOTE — ED ADULT NURSE NOTE - OBJECTIVE STATEMENT
Pt brought in by ambulance c/o substernal chest pain starting 1 hour prior to arrival to ED.  Pt recently discharged from [facility], diagnosed with pericarditis. Pt alert and oriented x4. Pt denies shortness of breath, dizziness, nausea, vomiting. Pt states pain from chest does not radiate. Cardiac monitoring in progress, EKG done, safety maintained

## 2020-08-19 LAB
CULTURE RESULTS: SIGNIFICANT CHANGE UP
CULTURE RESULTS: SIGNIFICANT CHANGE UP
SPECIMEN SOURCE: SIGNIFICANT CHANGE UP
SPECIMEN SOURCE: SIGNIFICANT CHANGE UP

## 2020-08-19 PROCEDURE — 71045 X-RAY EXAM CHEST 1 VIEW: CPT | Mod: 26

## 2020-08-19 PROCEDURE — 99285 EMERGENCY DEPT VISIT HI MDM: CPT

## 2020-08-19 PROCEDURE — 93010 ELECTROCARDIOGRAM REPORT: CPT

## 2020-08-20 ENCOUNTER — INPATIENT (INPATIENT)
Facility: HOSPITAL | Age: 30
LOS: 0 days | Discharge: HOME | End: 2020-08-21
Attending: INTERNAL MEDICINE | Admitting: INTERNAL MEDICINE
Payer: MEDICAID

## 2020-08-20 ENCOUNTER — OUTPATIENT (OUTPATIENT)
Dept: OUTPATIENT SERVICES | Facility: HOSPITAL | Age: 30
LOS: 1 days | End: 2020-08-20

## 2020-08-20 DIAGNOSIS — Z98.890 OTHER SPECIFIED POSTPROCEDURAL STATES: Chronic | ICD-10-CM

## 2020-08-20 PROCEDURE — 93010 ELECTROCARDIOGRAM REPORT: CPT

## 2020-08-20 PROCEDURE — 71275 CT ANGIOGRAPHY CHEST: CPT | Mod: 26

## 2020-08-20 PROCEDURE — 99223 1ST HOSP IP/OBS HIGH 75: CPT

## 2020-08-21 ENCOUNTER — OUTPATIENT (OUTPATIENT)
Dept: OUTPATIENT SERVICES | Facility: HOSPITAL | Age: 30
LOS: 1 days | End: 2020-08-21

## 2020-08-21 DIAGNOSIS — Z98.890 OTHER SPECIFIED POSTPROCEDURAL STATES: Chronic | ICD-10-CM

## 2020-08-22 ENCOUNTER — EMERGENCY (EMERGENCY)
Facility: HOSPITAL | Age: 30
LOS: 1 days | Discharge: HOME | End: 2020-08-22
Attending: EMERGENCY MEDICINE
Payer: COMMERCIAL

## 2020-08-22 VITALS
DIASTOLIC BLOOD PRESSURE: 74 MMHG | RESPIRATION RATE: 17 BRPM | SYSTOLIC BLOOD PRESSURE: 128 MMHG | HEART RATE: 68 BPM | OXYGEN SATURATION: 99 %

## 2020-08-22 VITALS — HEIGHT: 71 IN

## 2020-08-22 DIAGNOSIS — Z98.890 OTHER SPECIFIED POSTPROCEDURAL STATES: Chronic | ICD-10-CM

## 2020-08-22 LAB
ALBUMIN SERPL ELPH-MCNC: 3.9 G/DL — SIGNIFICANT CHANGE UP (ref 3.3–5.2)
ALP SERPL-CCNC: 55 U/L — SIGNIFICANT CHANGE UP (ref 40–120)
ALT FLD-CCNC: 11 U/L — SIGNIFICANT CHANGE UP
ANION GAP SERPL CALC-SCNC: 14 MMOL/L — SIGNIFICANT CHANGE UP (ref 5–17)
AST SERPL-CCNC: 19 U/L — SIGNIFICANT CHANGE UP
BASOPHILS # BLD AUTO: 0.02 K/UL — SIGNIFICANT CHANGE UP (ref 0–0.2)
BASOPHILS NFR BLD AUTO: 0.5 % — SIGNIFICANT CHANGE UP (ref 0–2)
BILIRUB SERPL-MCNC: 0.2 MG/DL — LOW (ref 0.4–2)
BUN SERPL-MCNC: 10 MG/DL — SIGNIFICANT CHANGE UP (ref 8–20)
CALCIUM SERPL-MCNC: 9.3 MG/DL — SIGNIFICANT CHANGE UP (ref 8.6–10.2)
CHLORIDE SERPL-SCNC: 102 MMOL/L — SIGNIFICANT CHANGE UP (ref 98–107)
CO2 SERPL-SCNC: 23 MMOL/L — SIGNIFICANT CHANGE UP (ref 22–29)
CREAT SERPL-MCNC: 0.64 MG/DL — SIGNIFICANT CHANGE UP (ref 0.5–1.3)
EOSINOPHIL # BLD AUTO: 0.29 K/UL — SIGNIFICANT CHANGE UP (ref 0–0.5)
EOSINOPHIL NFR BLD AUTO: 6.6 % — HIGH (ref 0–6)
GLUCOSE SERPL-MCNC: 110 MG/DL — HIGH (ref 70–99)
HCT VFR BLD CALC: 38.1 % — LOW (ref 39–50)
HGB BLD-MCNC: 12.3 G/DL — LOW (ref 13–17)
IMM GRANULOCYTES NFR BLD AUTO: 0.2 % — SIGNIFICANT CHANGE UP (ref 0–1.5)
LYMPHOCYTES # BLD AUTO: 2.3 K/UL — SIGNIFICANT CHANGE UP (ref 1–3.3)
LYMPHOCYTES # BLD AUTO: 52.5 % — HIGH (ref 13–44)
MCHC RBC-ENTMCNC: 28.7 PG — SIGNIFICANT CHANGE UP (ref 27–34)
MCHC RBC-ENTMCNC: 32.3 GM/DL — SIGNIFICANT CHANGE UP (ref 32–36)
MCV RBC AUTO: 88.8 FL — SIGNIFICANT CHANGE UP (ref 80–100)
MONOCYTES # BLD AUTO: 0.4 K/UL — SIGNIFICANT CHANGE UP (ref 0–0.9)
MONOCYTES NFR BLD AUTO: 9.1 % — SIGNIFICANT CHANGE UP (ref 2–14)
NEUTROPHILS # BLD AUTO: 1.36 K/UL — LOW (ref 1.8–7.4)
NEUTROPHILS NFR BLD AUTO: 31.1 % — LOW (ref 43–77)
PLATELET # BLD AUTO: 262 K/UL — SIGNIFICANT CHANGE UP (ref 150–400)
POTASSIUM SERPL-MCNC: 3.9 MMOL/L — SIGNIFICANT CHANGE UP (ref 3.5–5.3)
POTASSIUM SERPL-SCNC: 3.9 MMOL/L — SIGNIFICANT CHANGE UP (ref 3.5–5.3)
PROT SERPL-MCNC: 7.3 G/DL — SIGNIFICANT CHANGE UP (ref 6.6–8.7)
RBC # BLD: 4.29 M/UL — SIGNIFICANT CHANGE UP (ref 4.2–5.8)
RBC # FLD: 13.4 % — SIGNIFICANT CHANGE UP (ref 10.3–14.5)
SODIUM SERPL-SCNC: 139 MMOL/L — SIGNIFICANT CHANGE UP (ref 135–145)
TROPONIN T SERPL-MCNC: <0.01 NG/ML — SIGNIFICANT CHANGE UP (ref 0–0.06)
WBC # BLD: 4.38 K/UL — SIGNIFICANT CHANGE UP (ref 3.8–10.5)
WBC # FLD AUTO: 4.38 K/UL — SIGNIFICANT CHANGE UP (ref 3.8–10.5)

## 2020-08-22 PROCEDURE — 99284 EMERGENCY DEPT VISIT MOD MDM: CPT | Mod: 25

## 2020-08-22 PROCEDURE — 71045 X-RAY EXAM CHEST 1 VIEW: CPT

## 2020-08-22 PROCEDURE — 96374 THER/PROPH/DIAG INJ IV PUSH: CPT

## 2020-08-22 PROCEDURE — 85027 COMPLETE CBC AUTOMATED: CPT

## 2020-08-22 PROCEDURE — 71045 X-RAY EXAM CHEST 1 VIEW: CPT | Mod: 26

## 2020-08-22 PROCEDURE — 36415 COLL VENOUS BLD VENIPUNCTURE: CPT

## 2020-08-22 PROCEDURE — 84484 ASSAY OF TROPONIN QUANT: CPT

## 2020-08-22 PROCEDURE — 99284 EMERGENCY DEPT VISIT MOD MDM: CPT

## 2020-08-22 PROCEDURE — 80053 COMPREHEN METABOLIC PANEL: CPT

## 2020-08-22 RX ORDER — KETOROLAC TROMETHAMINE 30 MG/ML
30 SYRINGE (ML) INJECTION ONCE
Refills: 0 | Status: DISCONTINUED | OUTPATIENT
Start: 2020-08-22 | End: 2020-08-22

## 2020-08-22 RX ORDER — IBUPROFEN 200 MG
1 TABLET ORAL
Qty: 28 | Refills: 0
Start: 2020-08-22 | End: 2020-08-28

## 2020-08-22 RX ADMIN — Medication 30 MILLIGRAM(S): at 06:42

## 2020-08-22 NOTE — ED ADULT NURSE NOTE - CHPI ED NUR SYMPTOMS NEG
no back pain/no dizziness/no vomiting/no shortness of breath/no nausea/no chills/no syncope/no diaphoresis/no congestion/no chest pain/no fever

## 2020-08-22 NOTE — ED PROVIDER NOTE - CLINICAL SUMMARY MEDICAL DECISION MAKING FREE TEXT BOX
29 y/o M hx olf HIV (last CD4 count in June 2020 390, on medications), smoker, GBS presents with chest pain localized to the R side that began 30 minutes prior to arrival.   CBC, CMP, trop, EKG, cxr  IV toradol for pain

## 2020-08-22 NOTE — ED ADULT NURSE NOTE - NSIMPLEMENTINTERV_GEN_ALL_ED
Implemented All Universal Safety Interventions:  [place] to call system. Call bell, personal items and telephone within reach. Instruct patient to call for assistance. Room bathroom lighting operational. Non-slip footwear when patient is off stretcher. Physically safe environment: no spills, clutter or unnecessary equipment. Stretcher in lowest position, wheels locked, appropriate side rails in place.

## 2020-08-22 NOTE — ED ADULT TRIAGE NOTE - CHIEF COMPLAINT QUOTE
Pt with hx of asthma is coming from home with c/o shortness of breath that woke him from sleep a/w chest discomfort

## 2020-08-22 NOTE — ED ADULT NURSE NOTE - PMH
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

## 2020-08-22 NOTE — ED PROVIDER NOTE - ATTENDING CONTRIBUTION TO CARE
31 yo M with hx HIV since birth presents to ED via EMS c/o being awakened by ant chest wall pain.  Pt with multiple visits to multiple EDs with same c/o and has extensive w/u including CT chest.  Pt with hx of substance abuse including THC and cocaine-last cocaine use 4 days ago.  Pt has cardio f/u next month.  Pain worsens with movement and respiration.  On exam awake and alert in NAD, Neck supple, Cor Reg, Lungs clear b/l, no R/R/W, Abd soft, NT, Ext no edema, FROM, Neuro non-focal,  EKG NSR.  will check labs, CXR and re-eval after IV Toradol

## 2020-08-22 NOTE — ED PROVIDER NOTE - PATIENT PORTAL LINK FT
You can access the FollowMyHealth Patient Portal offered by [facility] by registering at the following website: [email or web address]. By joining [employer]’s FollowMyHealth portal, you will also be able to view your health information using other applications (apps) compatible with our system.

## 2020-08-22 NOTE — ED ADULT NURSE NOTE - OBJECTIVE STATEMENT
29 yo male AOx4 c/o constant dull midsternal CP non-radiating, starting approx 30 mins ago, awoke from sleep. pt reports pain worsens with deep breath and movement. pt denies SOB, dizziness, nausea/vomiting, fevers. pt denies recent strenuous activity or pain upon palpation. respirations even and unlabored, O2 sat 100% on room air. abd soft and non distended. pt swelling noted to extremities. pt admits to recent marijuana use yesterday, denies ETOH.

## 2020-08-22 NOTE — ED PROVIDER NOTE - OBJECTIVE STATEMENT
31 y/o M hx olf HIV (last CD4 count in June 2020 390, on medications), smoker, GBS presents with chest pain localized to the R side that began 30 minutes prior to arrival. Described as constant and dull in nature. Endorses worse with exertion. Pain woke him up from sleep. Denies shortness of breath. Denies fever or chills. Denies cocaine use recently. Used marjiuana 1 time yesterday. Was recently seen in the ER 1 week ago for similar complaint. 29 y/o M hx olf HIV (last CD4 count in June 2020 390, on medications), smoker, GBS presents with chest pain localized to the R side that began 30 minutes prior to arrival. Described as constant and dull in nature. Endorses worse with exertion. Pain woke him up from sleep. Denies shortness of breath. Denies fever or chills. Denies cocaine use recently. Used marjiuana 1 time yesterday. Was recently seen in the ER 1 week ago for similar complaint. Last cocaine use 4 days ago.

## 2020-08-22 NOTE — ED PROVIDER NOTE - PHYSICAL EXAMINATION
General: Well appearing male in no acute distress  HEENT: Normocephalic, atraumatic. Moist mucous membranes. Oropharynx clear. No lymphadenopathy.  Eyes: No scleral icterus. EOMI. ROGERS.  Neck:. Soft and supple. Full ROM without pain. No midline tenderness  Cardiac: Regular rate and regular rhythm. No murmurs, rubs, gallops. Peripheral pulses 2+ and symmetric. No LE edema.  Resp: Lungs CTAB. Speaking in full sentences. No wheezes, rales or rhonchi.  Abd: Soft, non-tender, non-distended. No guarding or rebound. No scars, masses, or lesions.  Back: Spine midline and non-tender. No CVA tenderness.    Skin: No rashes, abrasions, or lacerations.  Neuro: AO x 3. Moves all extremities symmetrically. Motor strength and sensation grossly intact.

## 2020-08-22 NOTE — ED PROVIDER NOTE - PMH
Asthma    Guillain BarrÃ© syndrome    Guillain-[place]    HIV (human immunodeficiency virus infection)    HIV (human immunodeficiency virus infection)  from birth

## 2020-09-07 ENCOUNTER — EMERGENCY (EMERGENCY)
Facility: HOSPITAL | Age: 30
LOS: 1 days | Discharge: HOME | End: 2020-09-07
Attending: EMERGENCY MEDICINE
Payer: COMMERCIAL

## 2020-09-07 VITALS
DIASTOLIC BLOOD PRESSURE: 92 MMHG | TEMPERATURE: 98 F | HEART RATE: 61 BPM | SYSTOLIC BLOOD PRESSURE: 148 MMHG | RESPIRATION RATE: 18 BRPM | OXYGEN SATURATION: 97 %

## 2020-09-07 VITALS
HEIGHT: 71 IN | WEIGHT: 179.02 LBS | DIASTOLIC BLOOD PRESSURE: 98 MMHG | TEMPERATURE: 98 F | SYSTOLIC BLOOD PRESSURE: 152 MMHG | RESPIRATION RATE: 30 BRPM | HEART RATE: 57 BPM | OXYGEN SATURATION: 96 %

## 2020-09-07 DIAGNOSIS — Z98.890 OTHER SPECIFIED POSTPROCEDURAL STATES: Chronic | ICD-10-CM

## 2020-09-07 LAB
ALBUMIN SERPL ELPH-MCNC: 3.7 G/DL — SIGNIFICANT CHANGE UP (ref 3.3–5.2)
ALP SERPL-CCNC: 55 U/L — SIGNIFICANT CHANGE UP (ref 40–120)
ALT FLD-CCNC: 18 U/L — SIGNIFICANT CHANGE UP
ANION GAP SERPL CALC-SCNC: 12 MMOL/L — SIGNIFICANT CHANGE UP (ref 5–17)
AST SERPL-CCNC: 34 U/L — SIGNIFICANT CHANGE UP
BASOPHILS # BLD AUTO: 0 K/UL — SIGNIFICANT CHANGE UP (ref 0–0.2)
BASOPHILS NFR BLD AUTO: 0 % — SIGNIFICANT CHANGE UP (ref 0–2)
BILIRUB SERPL-MCNC: 0.3 MG/DL — LOW (ref 0.4–2)
BUN SERPL-MCNC: 12 MG/DL — SIGNIFICANT CHANGE UP (ref 8–20)
CALCIUM SERPL-MCNC: 8.8 MG/DL — SIGNIFICANT CHANGE UP (ref 8.6–10.2)
CHLORIDE SERPL-SCNC: 103 MMOL/L — SIGNIFICANT CHANGE UP (ref 98–107)
CO2 SERPL-SCNC: 21 MMOL/L — LOW (ref 22–29)
CREAT SERPL-MCNC: 0.68 MG/DL — SIGNIFICANT CHANGE UP (ref 0.5–1.3)
EOSINOPHIL # BLD AUTO: 0 K/UL — SIGNIFICANT CHANGE UP (ref 0–0.5)
EOSINOPHIL NFR BLD AUTO: 0 % — SIGNIFICANT CHANGE UP (ref 0–6)
GLUCOSE SERPL-MCNC: 104 MG/DL — HIGH (ref 70–99)
HCT VFR BLD CALC: 36.8 % — LOW (ref 39–50)
HGB BLD-MCNC: 11.9 G/DL — LOW (ref 13–17)
IMM GRANULOCYTES NFR BLD AUTO: 0.6 % — SIGNIFICANT CHANGE UP (ref 0–1.5)
LYMPHOCYTES # BLD AUTO: 0.86 K/UL — LOW (ref 1–3.3)
LYMPHOCYTES # BLD AUTO: 24 % — SIGNIFICANT CHANGE UP (ref 13–44)
MCHC RBC-ENTMCNC: 29.2 PG — SIGNIFICANT CHANGE UP (ref 27–34)
MCHC RBC-ENTMCNC: 32.3 GM/DL — SIGNIFICANT CHANGE UP (ref 32–36)
MCV RBC AUTO: 90.2 FL — SIGNIFICANT CHANGE UP (ref 80–100)
MONOCYTES # BLD AUTO: 0.2 K/UL — SIGNIFICANT CHANGE UP (ref 0–0.9)
MONOCYTES NFR BLD AUTO: 5.6 % — SIGNIFICANT CHANGE UP (ref 2–14)
NEUTROPHILS # BLD AUTO: 2.51 K/UL — SIGNIFICANT CHANGE UP (ref 1.8–7.4)
NEUTROPHILS NFR BLD AUTO: 69.8 % — SIGNIFICANT CHANGE UP (ref 43–77)
PLATELET # BLD AUTO: 194 K/UL — SIGNIFICANT CHANGE UP (ref 150–400)
POTASSIUM SERPL-MCNC: 4.6 MMOL/L — SIGNIFICANT CHANGE UP (ref 3.5–5.3)
POTASSIUM SERPL-SCNC: 4.6 MMOL/L — SIGNIFICANT CHANGE UP (ref 3.5–5.3)
PROT SERPL-MCNC: 7.5 G/DL — SIGNIFICANT CHANGE UP (ref 6.6–8.7)
RBC # BLD: 4.08 M/UL — LOW (ref 4.2–5.8)
RBC # FLD: 14.1 % — SIGNIFICANT CHANGE UP (ref 10.3–14.5)
SODIUM SERPL-SCNC: 136 MMOL/L — SIGNIFICANT CHANGE UP (ref 135–145)
TROPONIN T SERPL-MCNC: <0.01 NG/ML — SIGNIFICANT CHANGE UP (ref 0–0.06)
WBC # BLD: 3.59 K/UL — LOW (ref 3.8–10.5)
WBC # FLD AUTO: 3.59 K/UL — LOW (ref 3.8–10.5)

## 2020-09-07 PROCEDURE — 93010 ELECTROCARDIOGRAM REPORT: CPT

## 2020-09-07 PROCEDURE — 71046 X-RAY EXAM CHEST 2 VIEWS: CPT | Mod: 26

## 2020-09-07 PROCEDURE — 99285 EMERGENCY DEPT VISIT HI MDM: CPT

## 2020-09-07 RX ORDER — KETOROLAC TROMETHAMINE 30 MG/ML
15 SYRINGE (ML) INJECTION ONCE
Refills: 0 | Status: DISCONTINUED | OUTPATIENT
Start: 2020-09-07 | End: 2020-09-07

## 2020-09-07 RX ADMIN — Medication 15 MILLIGRAM(S): at 16:51

## 2020-09-07 NOTE — ED ADULT TRIAGE NOTE - CHIEF COMPLAINT QUOTE
Chest pain that began 2 days ago, was seen at [facility] for same.  Hx of pericarditis this year.  Pt anxious in triage. Chest pain that began 2 days ago, was seen at [facility] for same.  Hx of pericarditis this year.  Pt anxious in triage.  Given 324 ASA by EMS.

## 2020-09-07 NOTE — ED ADULT NURSE NOTE - CHIEF COMPLAINT QUOTE
Chest pain that began 2 days ago, was seen at [facility] for same.  Hx of pericarditis this year.  Pt anxious in triage.  Given 324 ASA by EMS.

## 2020-09-07 NOTE — ED PROVIDER NOTE - PROGRESS NOTE DETAILS
Melany: pt feeling much better, cxr and trop/labs wnl. return precautions. has cards f/u. stable for d/c.

## 2020-09-07 NOTE — ED PROVIDER NOTE - PATIENT PORTAL LINK FT
You can access the FollowMyHealth Patient Portal offered by [facility] by registering at the following website: [email or web address]. By joining [employer]’s FollowMyHealth portal, you will also be able to view your health information using other applications (apps) compatible with our system.

## 2020-09-07 NOTE — ED PROVIDER NOTE - OBJECTIVE STATEMENT
31 y/o male hx HIV (CD4>400), guillian barre, cocaine abuse (last use several weeks ago per pt) c/o chest pain episode today. States similar pain to last few ED visits. STates gets pain frequently, last pain was yesterday. Denies sob, no f/c, no cough, no travel, no cardiac hx in pt or family, no leg pain/swelling. Has cardiology appointment in 4 days.     ROS: No fever/chills. No eye pain/changes in vision, No ear pain/sore throat/dysphagia, No palpitations. No SOB/cough/. No abdominal pain, N/V/D, no black/bloody bm. No dysuria/frequency/discharge, No headache. No Dizziness.    No rashes or breaks in skin. No numbness/tingling/weakness.

## 2020-09-07 NOTE — ED ADULT NURSE NOTE - PMH
Asthma    Guillain BarrÃ© syndrome    Guillain-[place]    HIV (human immunodeficiency virus infection)    HIV (human immunodeficiency virus infection)  from birth

## 2020-09-07 NOTE — ED PROVIDER NOTE - PHYSICAL EXAMINATION
Gen: No acute distress, non toxic  HEENT: Mucous membranes moist, pink conjunctivae, EOMI  CV: RRR, nl s1/s2. equal pulses b/l.   Resp: CTAB, normal rate and effort  GI: Abdomen soft, NT, ND. No rebound, no guarding  : No CVAT  Neuro: A&O x 3, moving all 4 extremities  MSK: No spine or joint tenderness to palpation. no swelling b/l LE  Skin: No rashes. intact and perfused.

## 2020-09-07 NOTE — ED ADULT NURSE NOTE - OBJECTIVE STATEMENT
Patient arrived to ED today with c/o chest pains, hx of cardiomyopathy he states and was seen recently at [facility] ED.

## 2020-09-07 NOTE — ED PROVIDER NOTE - CLINICAL SUMMARY MEDICAL DECISION MAKING FREE TEXT BOX
pt with similar cp as in past, has cards f/u. ekg non ischemic, no recent drugs. perc neg. will do one trop, supportive care, reassess.

## 2020-09-07 NOTE — ED PROVIDER NOTE - PMH
Asthma    Guillain BarrÃ© syndrome    Guillain-[place]    HIV (human immunodeficiency virus infection)    HIV (human immunodeficiency virus infection)  from birth

## 2020-09-13 ENCOUNTER — EMERGENCY (EMERGENCY)
Facility: HOSPITAL | Age: 30
LOS: 1 days | Discharge: HOME | End: 2020-09-13
Attending: STUDENT IN AN ORGANIZED HEALTH CARE EDUCATION/TRAINING PROGRAM
Payer: COMMERCIAL

## 2020-09-13 VITALS
HEART RATE: 55 BPM | WEIGHT: 169.09 LBS | DIASTOLIC BLOOD PRESSURE: 73 MMHG | SYSTOLIC BLOOD PRESSURE: 145 MMHG | RESPIRATION RATE: 18 BRPM | OXYGEN SATURATION: 97 % | HEIGHT: 71 IN | TEMPERATURE: 98 F

## 2020-09-13 DIAGNOSIS — Z98.890 OTHER SPECIFIED POSTPROCEDURAL STATES: Chronic | ICD-10-CM

## 2020-09-13 LAB
ALBUMIN SERPL ELPH-MCNC: 4.1 G/DL — SIGNIFICANT CHANGE UP (ref 3.3–5.2)
ALP SERPL-CCNC: 67 U/L — SIGNIFICANT CHANGE UP (ref 40–120)
ALT FLD-CCNC: 19 U/L — SIGNIFICANT CHANGE UP
ANION GAP SERPL CALC-SCNC: 11 MMOL/L — SIGNIFICANT CHANGE UP (ref 5–17)
APTT BLD: 32.2 SEC — SIGNIFICANT CHANGE UP (ref 27.5–35.5)
AST SERPL-CCNC: 42 U/L — HIGH
BASOPHILS # BLD AUTO: 0 K/UL — SIGNIFICANT CHANGE UP (ref 0–0.2)
BASOPHILS NFR BLD AUTO: 0 % — SIGNIFICANT CHANGE UP (ref 0–2)
BILIRUB SERPL-MCNC: 0.3 MG/DL — LOW (ref 0.4–2)
BUN SERPL-MCNC: 12 MG/DL — SIGNIFICANT CHANGE UP (ref 8–20)
CALCIUM SERPL-MCNC: 9.2 MG/DL — SIGNIFICANT CHANGE UP (ref 8.6–10.2)
CHLORIDE SERPL-SCNC: 103 MMOL/L — SIGNIFICANT CHANGE UP (ref 98–107)
CO2 SERPL-SCNC: 25 MMOL/L — SIGNIFICANT CHANGE UP (ref 22–29)
CREAT SERPL-MCNC: 0.72 MG/DL — SIGNIFICANT CHANGE UP (ref 0.5–1.3)
D DIMER BLD IA.RAPID-MCNC: 177 NG/ML DDU — SIGNIFICANT CHANGE UP
ELLIPTOCYTES BLD QL SMEAR: SLIGHT — SIGNIFICANT CHANGE UP
EOSINOPHIL # BLD AUTO: 0.27 K/UL — SIGNIFICANT CHANGE UP (ref 0–0.5)
EOSINOPHIL NFR BLD AUTO: 10 % — HIGH (ref 0–6)
GLUCOSE SERPL-MCNC: 78 MG/DL — SIGNIFICANT CHANGE UP (ref 70–99)
HCT VFR BLD CALC: 42.5 % — SIGNIFICANT CHANGE UP (ref 39–50)
HGB BLD-MCNC: 13.6 G/DL — SIGNIFICANT CHANGE UP (ref 13–17)
INR BLD: 1.07 RATIO — SIGNIFICANT CHANGE UP (ref 0.88–1.16)
LIDOCAIN IGE QN: 25 U/L — SIGNIFICANT CHANGE UP (ref 22–51)
LYMPHOCYTES # BLD AUTO: 1.55 K/UL — SIGNIFICANT CHANGE UP (ref 1–3.3)
LYMPHOCYTES # BLD AUTO: 58 % — HIGH (ref 13–44)
MAGNESIUM SERPL-MCNC: 1.6 MG/DL — SIGNIFICANT CHANGE UP (ref 1.6–2.6)
MANUAL SMEAR VERIFICATION: SIGNIFICANT CHANGE UP
MCHC RBC-ENTMCNC: 29.4 PG — SIGNIFICANT CHANGE UP (ref 27–34)
MCHC RBC-ENTMCNC: 32 GM/DL — SIGNIFICANT CHANGE UP (ref 32–36)
MCV RBC AUTO: 91.8 FL — SIGNIFICANT CHANGE UP (ref 80–100)
MONOCYTES # BLD AUTO: 0.16 K/UL — SIGNIFICANT CHANGE UP (ref 0–0.9)
MONOCYTES NFR BLD AUTO: 6 % — SIGNIFICANT CHANGE UP (ref 2–14)
NEUTROPHILS # BLD AUTO: 0.7 K/UL — LOW (ref 1.8–7.4)
NEUTROPHILS NFR BLD AUTO: 26 % — LOW (ref 43–77)
NRBC # BLD: 0 /100 — SIGNIFICANT CHANGE UP (ref 0–0)
NT-PROBNP SERPL-SCNC: 17 PG/ML — SIGNIFICANT CHANGE UP (ref 0–300)
OVALOCYTES BLD QL SMEAR: SLIGHT — SIGNIFICANT CHANGE UP
PLAT MORPH BLD: NORMAL — SIGNIFICANT CHANGE UP
PLATELET # BLD AUTO: 239 K/UL — SIGNIFICANT CHANGE UP (ref 150–400)
POIKILOCYTOSIS BLD QL AUTO: SLIGHT — SIGNIFICANT CHANGE UP
POTASSIUM SERPL-MCNC: 4.8 MMOL/L — SIGNIFICANT CHANGE UP (ref 3.5–5.3)
POTASSIUM SERPL-SCNC: 4.8 MMOL/L — SIGNIFICANT CHANGE UP (ref 3.5–5.3)
PROT SERPL-MCNC: 7.9 G/DL — SIGNIFICANT CHANGE UP (ref 6.6–8.7)
PROTHROM AB SERPL-ACNC: 12.4 SEC — SIGNIFICANT CHANGE UP (ref 10.6–13.6)
RBC # BLD: 4.63 M/UL — SIGNIFICANT CHANGE UP (ref 4.2–5.8)
RBC # FLD: 13.9 % — SIGNIFICANT CHANGE UP (ref 10.3–14.5)
RBC BLD AUTO: SIGNIFICANT CHANGE UP
SODIUM SERPL-SCNC: 139 MMOL/L — SIGNIFICANT CHANGE UP (ref 135–145)
TROPONIN T SERPL-MCNC: <0.01 NG/ML — SIGNIFICANT CHANGE UP (ref 0–0.06)
WBC # BLD: 2.68 K/UL — LOW (ref 3.8–10.5)
WBC # FLD AUTO: 2.68 K/UL — LOW (ref 3.8–10.5)

## 2020-09-13 PROCEDURE — 85730 THROMBOPLASTIN TIME PARTIAL: CPT

## 2020-09-13 PROCEDURE — 36415 COLL VENOUS BLD VENIPUNCTURE: CPT

## 2020-09-13 PROCEDURE — 93010 ELECTROCARDIOGRAM REPORT: CPT

## 2020-09-13 PROCEDURE — 83880 ASSAY OF NATRIURETIC PEPTIDE: CPT

## 2020-09-13 PROCEDURE — 71046 X-RAY EXAM CHEST 2 VIEWS: CPT

## 2020-09-13 PROCEDURE — 96374 THER/PROPH/DIAG INJ IV PUSH: CPT

## 2020-09-13 PROCEDURE — 80053 COMPREHEN METABOLIC PANEL: CPT

## 2020-09-13 PROCEDURE — 84484 ASSAY OF TROPONIN QUANT: CPT

## 2020-09-13 PROCEDURE — 99285 EMERGENCY DEPT VISIT HI MDM: CPT

## 2020-09-13 PROCEDURE — 71046 X-RAY EXAM CHEST 2 VIEWS: CPT | Mod: 26

## 2020-09-13 PROCEDURE — 85025 COMPLETE CBC W/AUTO DIFF WBC: CPT

## 2020-09-13 PROCEDURE — 83690 ASSAY OF LIPASE: CPT

## 2020-09-13 PROCEDURE — 85379 FIBRIN DEGRADATION QUANT: CPT

## 2020-09-13 PROCEDURE — 93005 ELECTROCARDIOGRAM TRACING: CPT

## 2020-09-13 PROCEDURE — 83735 ASSAY OF MAGNESIUM: CPT

## 2020-09-13 PROCEDURE — 96375 TX/PRO/DX INJ NEW DRUG ADDON: CPT

## 2020-09-13 PROCEDURE — 85027 COMPLETE CBC AUTOMATED: CPT

## 2020-09-13 PROCEDURE — 99284 EMERGENCY DEPT VISIT MOD MDM: CPT | Mod: 25

## 2020-09-13 PROCEDURE — 85610 PROTHROMBIN TIME: CPT

## 2020-09-13 RX ORDER — MORPHINE SULFATE 50 MG/1
4 CAPSULE, EXTENDED RELEASE ORAL ONCE
Refills: 0 | Status: DISCONTINUED | OUTPATIENT
Start: 2020-09-13 | End: 2020-09-13

## 2020-09-13 RX ORDER — KETOROLAC TROMETHAMINE 30 MG/ML
30 SYRINGE (ML) INJECTION ONCE
Refills: 0 | Status: DISCONTINUED | OUTPATIENT
Start: 2020-09-13 | End: 2020-09-13

## 2020-09-13 RX ADMIN — MORPHINE SULFATE 4 MILLIGRAM(S): 50 CAPSULE, EXTENDED RELEASE ORAL at 21:53

## 2020-09-13 RX ADMIN — Medication 30 MILLIGRAM(S): at 21:53

## 2020-09-13 NOTE — ED PROVIDER NOTE - CHPI ED SYMPTOMS NEG
no diaphoresis/no back pain/no cough/no dizziness/no nausea/no shortness of breath/no vomiting/no fever/no chills

## 2020-09-13 NOTE — ED PROVIDER NOTE - CLINICAL SUMMARY MEDICAL DECISION MAKING FREE TEXT BOX
30M with HIV (CD4 380s, viral load undetectable), guillian barre, cocaine abuse p/w intermittent CP x 6 months and b/l frontal HA x 2 weeks. Pt was here 1 week ago for similar pain per pt and had negative cardiac workup. Pt f/u cards OP 2 days ago and had negative stress test. EKG today normal and unchanged. 30M with HIV (CD4 380s, viral load undetectable), guillian barre, cocaine abuse p/w intermittent CP x 6 months and b/l frontal HA x 2 weeks. Pt was here 1 week ago for similar pain per pt and had negative cardiac workup. Pt f/u cards OP 2 days ago and had negative stress test. EKG today normal and unchanged. Will get basic labs, d-dimer, coags, CXR. Treat pain with toradol 30mg IVP and morphine 4mg IVP. D/c home with op f/u.

## 2020-09-13 NOTE — ED PROVIDER NOTE - MUSCULOSKELETAL, MLM
No chest wall tenderness. Spine appears normal, range of motion is not limited, no muscle or joint tenderness

## 2020-09-13 NOTE — ED ADULT NURSE NOTE - PMH
Asthma    Guillain BarrÃ© syndrome    Guillain-[place]    HIV (human immunodeficiency virus infection)    HIV (human immunodeficiency virus infection)  from birth

## 2020-09-13 NOTE — ED ADULT TRIAGE NOTE - CHIEF COMPLAINT QUOTE
c/o L sided chest pain sudden onset 1 hour PTA, radiating to back. +2 episodes vomiting this AM. denies SOB, denies fevers

## 2020-09-13 NOTE — ED PROVIDER NOTE - PMH
Asthma    Guillain BarrÃ© syndrome    Guillain-[place]    HIV (human immunodeficiency virus infection)    HIV (human immunodeficiency virus infection)  from birth

## 2020-09-13 NOTE — ED PROVIDER NOTE - OBJECTIVE STATEMENT
30M with pmhx HIV (CD4 380s, viral load undetectable), guillian barre, cocaine abuse (last use several weeks ago per pt) p/w intermittent CP x 6 months. No change with position or eating. States similar pain to last few ED visits. Pain improves with morphine in ED. Pt saw cards 2 days ago, had a negative stress test. Pt also reports 10/10 constant HA x 2 weeks - describes b/l retroorbital and frontal throbbing pain, with photophobia, no vision changes or syncope. 30M with pmhx HIV (CD4 380s, viral load undetectable), guillian barre, cocaine abuse (last use several weeks ago per pt) p/w intermittent CP x 6 months. he states however, the pain has been constant for the past two weeks or so. He was seen in the ED twice with no acte findings. He patient states that he was prescribed ibuprofen which has no affect on his chest pain. He states he has not taken it in over a week. He states his pain is only relived with IV medication given in the hospital; believes that morphine is what helps his pain the most. No change with exertion, position, palpation, or eating. States similar pain to last few ED visits without significant changes. He states that he saw his cardiologist about 2 days ago, had a negative nuclear stress test. Pt also reports 10/10 constant HA x 2 weeks - describes b/l retroorbital and frontal throbbing pain, with episodes photophobia, no vision changes. new focal neurologic symptoms, or syncope. he states the headache is gradual in onset and offset without treatment.

## 2020-09-13 NOTE — ED PROVIDER NOTE - PATIENT PORTAL LINK FT
You can access the FollowMyHealth Patient Portal offered by [facility] by registering at the following website: [email or web address]. By joining [employer]’s FollowMyHealth portal, you will also be able to view your health information using other applications (apps) compatible with our system.

## 2020-09-13 NOTE — ED PROVIDER NOTE - PROGRESS NOTE DETAILS
Pt reports that pain improved with pain meds. Stable for d/c but pt is homeless and does not have a shelter to go to [facility]

## 2020-09-13 NOTE — ED PROVIDER NOTE - ATTENDING CONTRIBUTION TO CARE
31 yo male presents for management of chronic chest pain for a couple of weeks. I personally saw the patient with the medical student, and completed the key components of the history and physical exam. I then discussed the management plan with the medical student.

## 2020-09-13 NOTE — ED PROVIDER NOTE - NEUROLOGICAL, MLM
b/l LE weakness L weaker than R. Pt able to ambulate with difficulty and instability. No UE deficits. b/l LE weakness L weaker than R- chronic as per patient. Pt able to ambulate with difficulty and instability. No UE deficits. left leg tenderness with muscle spasm and without swelling, sobeida's sign, palpable cord

## 2020-09-13 NOTE — ED PROVIDER NOTE - NSFOLLOWUPINSTRUCTIONS_ED_ALL_ED_FT
1) Follow up with your doctor within one week to discuss further pain management of your chronic chest pain symptoms and possible adjustment of your currently medication regimen  2) Return to the ER for worsening or concerning symptoms  3) Take medication as prescribed by your doctors.      Chest Pain    WHAT YOU NEED TO KNOW:    Chest pain can be caused by a range of conditions, from not serious to life-threatening. Chest pain can be a symptom of a digestive problem, such as acid reflux or a stomach ulcer. An anxiety attack or a strong emotion, such as anger, can also cause chest pain. Infection, inflammation, or a fracture in the bones or cartilage in your chest can cause pain or discomfort. Sometimes chest pain or pressure is caused by poor blood flow to your heart (angina). Chest pain may also be caused by life-threatening conditions such as a heart attack or blood clot in your lungs.     DISCHARGE INSTRUCTIONS:    Call 911 if:   •You have any of the following signs of a heart attack: ?Squeezing, pressure, or pain in your chest      ?You may also have any of the following: ?Discomfort or pain in your back, neck, jaw, stomach, or arm      ?Shortness of breath      ?Nausea or vomiting      ?Lightheadedness or a sudden cold sweat            Return to the emergency department if:   •You have chest discomfort that gets worse, even with medicine.      •You cough or vomit blood.       •Your bowel movements are black or bloody.       •You cannot stop vomiting, or it hurts to swallow.       Contact your healthcare provider if:   •You have questions or concerns about your condition or care.          Medicines:   •Medicines may be given to treat the cause of your chest pain. Examples include pain medicine, anxiety medicine, or medicines to increase blood flow to your heart.       •Do not take certain medicines without asking your healthcare provider first. These include NSAIDs, herbal or vitamin supplements, or hormones (estrogen or progestin).       •Take your medicine as directed. Contact your healthcare provider if you think your medicine is not helping or if you have side effects. Tell him or her if you are allergic to any medicine. Keep a list of the medicines, vitamins, and herbs you take. Include the amounts, and when and why you take them. Bring the list or the pill bottles to follow-up visits. Carry your medicine list with you in case of an emergency.      Follow up with your healthcare provider within 72 hours, or as directed: You may need to return for more tests to find the cause of your chest pain. You may be referred to a specialist, such as a cardiologist or gastroenterologist. Write down your questions so you remember to ask them during your visits.     Healthy living tips: The following are general healthy guidelines. If your chest pain is caused by a heart problem, your healthcare provider will give you specific guidelines to follow.  •Do not smoke. Nicotine and other chemicals in cigarettes and cigars can cause lung and heart damage. Ask your healthcare provider for information if you currently smoke and need help to quit. E-cigarettes or smokeless tobacco still contain nicotine. Talk to your healthcare provider before you use these products.       •Eat a variety of healthy, low-fat, low-salt foods. Healthy foods include fruits, vegetables, whole-grain breads, low-fat dairy products, beans, lean meats, and fish. Ask for more information about a heart healthy diet.      •Drink plenty of water every day. Your body is made of mostly water. Water helps your body to control your temperature and blood pressure. Ask your healthcare provider how much water you should drink every day.      •Ask about activity. Your healthcare provider will tell you which activities to limit or avoid. Ask when you can drive, return to work, and have sex. Ask about the best exercise plan for you.      •Maintain a healthy weight. Ask your healthcare provider how much you should weigh. Ask him or her to help you create a weight loss plan if you are overweight.       •Get the flu and pneumonia vaccines. All adults should get the influenza (flu) vaccine. Get it every year as soon as it becomes available. The pneumococcal vaccine is given to adults aged 65 years or older. The vaccine is given every 5 years to prevent pneumococcal disease, such as pneumonia.      If you have a stent:   •Carry your stent card with you at all times.       •Let all healthcare providers know that you have a stent.

## 2020-09-14 VITALS
SYSTOLIC BLOOD PRESSURE: 116 MMHG | OXYGEN SATURATION: 100 % | DIASTOLIC BLOOD PRESSURE: 66 MMHG | RESPIRATION RATE: 18 BRPM | HEART RATE: 66 BPM | TEMPERATURE: 98 F

## 2020-10-05 ENCOUNTER — EMERGENCY (EMERGENCY)
Facility: HOSPITAL | Age: 30
LOS: 0 days | Discharge: LEFT BEFORE DISCHARGE | End: 2020-10-06
Attending: STUDENT IN AN ORGANIZED HEALTH CARE EDUCATION/TRAINING PROGRAM | Admitting: INTERNAL MEDICINE
Payer: MEDICAID

## 2020-10-05 VITALS
TEMPERATURE: 99 F | RESPIRATION RATE: 18 BRPM | HEIGHT: 71 IN | SYSTOLIC BLOOD PRESSURE: 133 MMHG | HEART RATE: 93 BPM | DIASTOLIC BLOOD PRESSURE: 66 MMHG | OXYGEN SATURATION: 98 % | WEIGHT: 169.98 LBS

## 2020-10-05 DIAGNOSIS — B20 HUMAN IMMUNODEFICIENCY VIRUS [HIV] DISEASE: ICD-10-CM

## 2020-10-05 DIAGNOSIS — R26.81 UNSTEADINESS ON FEET: ICD-10-CM

## 2020-10-05 DIAGNOSIS — R07.81 PLEURODYNIA: ICD-10-CM

## 2020-10-05 DIAGNOSIS — J45.909 UNSPECIFIED ASTHMA, UNCOMPLICATED: ICD-10-CM
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
## 2020-10-05 DIAGNOSIS — R07.9 CHEST PAIN, UNSPECIFIED: ICD-10-CM

## 2020-10-05 DIAGNOSIS — F12.10 CANNABIS ABUSE, UNCOMPLICATED: ICD-10-CM

## 2020-10-05 DIAGNOSIS — S22.41XA MULTIPLE FRACTURES OF RIBS, RIGHT SIDE, INITIAL ENCOUNTER FOR CLOSED FRACTURE: ICD-10-CM

## 2020-10-05 DIAGNOSIS — Z98.890 OTHER SPECIFIED POSTPROCEDURAL STATES: Chronic | ICD-10-CM
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
## 2020-10-05 DIAGNOSIS — E78.5 HYPERLIPIDEMIA, UNSPECIFIED: ICD-10-CM

## 2020-10-05 DIAGNOSIS — J90 PLEURAL EFFUSION, NOT ELSEWHERE CLASSIFIED: ICD-10-CM

## 2020-10-05 DIAGNOSIS — Z86.73 PERSONAL HISTORY OF TRANSIENT ISCHEMIC ATTACK (TIA), AND CEREBRAL INFARCTION WITHOUT RESIDUAL DEFICITS: ICD-10-CM

## 2020-10-05 DIAGNOSIS — F17.200 NICOTINE DEPENDENCE, UNSPECIFIED, UNCOMPLICATED: ICD-10-CM

## 2020-10-05 DIAGNOSIS — G61.0 GUILLAIN-BARRE SYNDROME: ICD-10-CM

## 2020-10-05 DIAGNOSIS — F14.10 COCAINE ABUSE, UNCOMPLICATED: ICD-10-CM

## 2020-10-05 LAB
ALBUMIN SERPL ELPH-MCNC: 3.5 G/DL — SIGNIFICANT CHANGE UP (ref 3.3–5)
ALP SERPL-CCNC: 83 U/L — SIGNIFICANT CHANGE UP (ref 40–120)
ALT FLD-CCNC: 21 U/L — SIGNIFICANT CHANGE UP (ref 12–78)
ANION GAP SERPL CALC-SCNC: 6 MMOL/L — SIGNIFICANT CHANGE UP (ref 5–17)
APTT BLD: 31.5 SEC — SIGNIFICANT CHANGE UP (ref 27.5–35.5)
AST SERPL-CCNC: 31 U/L — SIGNIFICANT CHANGE UP (ref 15–37)
BASOPHILS # BLD AUTO: 0.01 K/UL — SIGNIFICANT CHANGE UP (ref 0–0.2)
BASOPHILS NFR BLD AUTO: 0.3 % — SIGNIFICANT CHANGE UP (ref 0–2)
BILIRUB SERPL-MCNC: 0.3 MG/DL — SIGNIFICANT CHANGE UP (ref 0.2–1.2)
BUN SERPL-MCNC: 11 MG/DL — SIGNIFICANT CHANGE UP (ref 7–23)
CALCIUM SERPL-MCNC: 8.7 MG/DL — SIGNIFICANT CHANGE UP (ref 8.5–10.1)
CHLORIDE SERPL-SCNC: 106 MMOL/L — SIGNIFICANT CHANGE UP (ref 96–108)
CO2 SERPL-SCNC: 25 MMOL/L — SIGNIFICANT CHANGE UP (ref 22–31)
CREAT SERPL-MCNC: 0.84 MG/DL — SIGNIFICANT CHANGE UP (ref 0.5–1.3)
D DIMER BLD IA.RAPID-MCNC: 420 NG/ML DDU — HIGH
EOSINOPHIL # BLD AUTO: 0.07 K/UL — SIGNIFICANT CHANGE UP (ref 0–0.5)
EOSINOPHIL NFR BLD AUTO: 2.2 % — SIGNIFICANT CHANGE UP (ref 0–6)
GLUCOSE SERPL-MCNC: 75 MG/DL — SIGNIFICANT CHANGE UP (ref 70–99)
HCT VFR BLD CALC: 37.1 % — LOW (ref 39–50)
HGB BLD-MCNC: 12 G/DL — LOW (ref 13–17)
IMM GRANULOCYTES NFR BLD AUTO: 0.3 % — SIGNIFICANT CHANGE UP (ref 0–1.5)
INR BLD: 1.08 RATIO — SIGNIFICANT CHANGE UP (ref 0.88–1.16)
LYMPHOCYTES # BLD AUTO: 1.6 K/UL — SIGNIFICANT CHANGE UP (ref 1–3.3)
LYMPHOCYTES # BLD AUTO: 49.5 % — HIGH (ref 13–44)
MCHC RBC-ENTMCNC: 29.1 PG — SIGNIFICANT CHANGE UP (ref 27–34)
MCHC RBC-ENTMCNC: 32.3 GM/DL — SIGNIFICANT CHANGE UP (ref 32–36)
MCV RBC AUTO: 90 FL — SIGNIFICANT CHANGE UP (ref 80–100)
MONOCYTES # BLD AUTO: 0.45 K/UL — SIGNIFICANT CHANGE UP (ref 0–0.9)
MONOCYTES NFR BLD AUTO: 13.9 % — SIGNIFICANT CHANGE UP (ref 2–14)
NEUTROPHILS # BLD AUTO: 1.09 K/UL — LOW (ref 1.8–7.4)
NEUTROPHILS NFR BLD AUTO: 33.8 % — LOW (ref 43–77)
NRBC # BLD: 0 /100 WBCS — SIGNIFICANT CHANGE UP (ref 0–0)
PLATELET # BLD AUTO: 237 K/UL — SIGNIFICANT CHANGE UP (ref 150–400)
POTASSIUM SERPL-MCNC: 3.6 MMOL/L — SIGNIFICANT CHANGE UP (ref 3.5–5.3)
POTASSIUM SERPL-SCNC: 3.6 MMOL/L — SIGNIFICANT CHANGE UP (ref 3.5–5.3)
PROT SERPL-MCNC: 7.8 GM/DL — SIGNIFICANT CHANGE UP (ref 6–8.3)
PROTHROM AB SERPL-ACNC: 12.5 SEC — SIGNIFICANT CHANGE UP (ref 10.6–13.6)
RBC # BLD: 4.12 M/UL — LOW (ref 4.2–5.8)
RBC # FLD: 13.8 % — SIGNIFICANT CHANGE UP (ref 10.3–14.5)
SARS-COV-2 RNA SPEC QL NAA+PROBE: SIGNIFICANT CHANGE UP
SODIUM SERPL-SCNC: 137 MMOL/L — SIGNIFICANT CHANGE UP (ref 135–145)
TROPONIN I SERPL-MCNC: <.015 NG/ML — SIGNIFICANT CHANGE UP (ref 0.01–0.04)
WBC # BLD: 3.23 K/UL — LOW (ref 3.8–10.5)
WBC # FLD AUTO: 3.23 K/UL — LOW (ref 3.8–10.5)

## 2020-10-05 PROCEDURE — 93010 ELECTROCARDIOGRAM REPORT: CPT

## 2020-10-05 PROCEDURE — 99282 EMERGENCY DEPT VISIT SF MDM: CPT

## 2020-10-05 PROCEDURE — 99285 EMERGENCY DEPT VISIT HI MDM: CPT

## 2020-10-05 PROCEDURE — 71275 CT ANGIOGRAPHY CHEST: CPT | Mod: 26

## 2020-10-05 RX ORDER — TRAMADOL HYDROCHLORIDE 50 MG/1
25 TABLET ORAL EVERY 6 HOURS
Refills: 0 | Status: DISCONTINUED | OUTPATIENT
Start: 2020-10-05 | End: 2020-10-06

## 2020-10-05 RX ORDER — ELVITEGRAVIR, COBICISTAT, EMTRICITABINE, AND TENOFOVIR ALAFENAMIDE 150; 150; 200; 10 MG/1; MG/1; MG/1; MG/1
1 TABLET ORAL DAILY
Refills: 0 | Status: DISCONTINUED | OUTPATIENT
Start: 2020-10-05 | End: 2020-10-06

## 2020-10-05 RX ORDER — ACETAMINOPHEN 500 MG
975 TABLET ORAL ONCE
Refills: 0 | Status: COMPLETED | OUTPATIENT
Start: 2020-10-05 | End: 2020-10-05

## 2020-10-05 RX ORDER — ATORVASTATIN CALCIUM 80 MG/1
20 TABLET, FILM COATED ORAL AT BEDTIME
Refills: 0 | Status: DISCONTINUED | OUTPATIENT
Start: 2020-10-05 | End: 2020-10-06

## 2020-10-05 RX ORDER — OXYCODONE HYDROCHLORIDE 5 MG/1
5 TABLET ORAL ONCE
Refills: 0 | Status: DISCONTINUED | OUTPATIENT
Start: 2020-10-05 | End: 2020-10-05

## 2020-10-05 RX ORDER — KETOROLAC TROMETHAMINE 30 MG/ML
15 SYRINGE (ML) INJECTION ONCE
Refills: 0 | Status: DISCONTINUED | OUTPATIENT
Start: 2020-10-05 | End: 2020-10-05

## 2020-10-05 RX ADMIN — OXYCODONE HYDROCHLORIDE 5 MILLIGRAM(S): 5 TABLET ORAL at 02:35

## 2020-10-05 RX ADMIN — ATORVASTATIN CALCIUM 20 MILLIGRAM(S): 80 TABLET, FILM COATED ORAL at 22:45

## 2020-10-05 RX ADMIN — OXYCODONE HYDROCHLORIDE 5 MILLIGRAM(S): 5 TABLET ORAL at 04:15

## 2020-10-05 RX ADMIN — Medication 15 MILLIGRAM(S): at 02:35

## 2020-10-05 RX ADMIN — OXYCODONE HYDROCHLORIDE 5 MILLIGRAM(S): 5 TABLET ORAL at 03:35

## 2020-10-05 RX ADMIN — Medication 975 MILLIGRAM(S): at 10:33

## 2020-10-05 RX ADMIN — Medication 975 MILLIGRAM(S): at 11:33

## 2020-10-05 RX ADMIN — OXYCODONE HYDROCHLORIDE 5 MILLIGRAM(S): 5 TABLET ORAL at 05:15

## 2020-10-05 RX ADMIN — Medication 15 MILLIGRAM(S): at 02:50

## 2020-10-05 NOTE — ED PROVIDER NOTE - PMH
Asthma    CVA (cerebral vascular accident)    Guillain BarrÃ© syndrome    Guillain-[place]    HIV (human immunodeficiency virus infection)    HIV (human immunodeficiency virus infection)  from birth

## 2020-10-05 NOTE — PHYSICAL THERAPY INITIAL EVALUATION ADULT - BALANCE TRAINING, PT EVAL
GOAL: Patient will demonstrate independent performance of ADLS c low falls risk with appropriate mobility/adaptive devices, in 4 weeks.
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
## 2020-10-05 NOTE — ED ADULT NURSE REASSESSMENT NOTE - NS ED NURSE REASSESS COMMENT FT1
Patient alert and oriented x 4; able to make needs known. Patient without complaint. Safety checks completed every hour. Patient safety maintained. Patient turned and positioned self independently. IV site assessed every 2 hours and remain within defined limits.  Continue to monitor pt

## 2020-10-05 NOTE — PHYSICAL THERAPY INITIAL EVALUATION ADULT - ADDITIONAL COMMENTS
Per patient lives in basement of house c 4 stair steps to enter and another  to his level. Previously independent in all mobility but falls a lot. right toes get caught and he falls.

## 2020-10-05 NOTE — H&P ADULT - NSHPLABSRESULTS_GEN_ALL_CORE
Recent Vitals  T(C): 36.6 (10-05-20 @ 16:23), Max: 37 (10-05-20 @ 01:19)  HR: 59 (10-05-20 @ 16:23) (59 - 93)  BP: 122/71 (10-05-20 @ 16:23) (118/74 - 140/79)  RR: 16 (10-05-20 @ 16:23) (16 - 18)  SpO2: 99% (10-05-20 @ 16:23) (98% - 99%)                        12.0   3.23  )-----------( 237      ( 05 Oct 2020 02:32 )             37.1     10-05    137  |  106  |  11  ----------------------------<  75  3.6   |  25  |  0.84    Ca    8.7      05 Oct 2020 02:32    TPro  7.8  /  Alb  3.5  /  TBili  0.3  /  DBili  x   /  AST  31  /  ALT  21  /  AlkPhos  83  10-05    PT/INR - ( 05 Oct 2020 02:32 )   PT: 12.5 sec;   INR: 1.08 ratio         PTT - ( 05 Oct 2020 02:32 )  PTT:31.5 sec  LIVER FUNCTIONS - ( 05 Oct 2020 02:32 )  Alb: 3.5 g/dL / Pro: 7.8 gm/dL / ALK PHOS: 83 U/L / ALT: 21 U/L / AST: 31 U/L / GGT: x               Home Medications:  albuterol 90 mcg/inh inhalation aerosol: 2 puff(s) inhaled every 6 hours, As needed, Bronchospasm (29 Mar 2020 13:39)  Genvoya oral tablet: 1 tab(s) orally once a day (25 Mar 2020 07:55)  SEROquel 100 mg oral tablet: 100mg in the morning and 200mg at bedtime (25 Mar 2020 07:55)

## 2020-10-05 NOTE — H&P ADULT - NSICDXPASTMEDICALHX_GEN_ALL_CORE_FT
PAST MEDICAL HISTORY:  Asthma     CVA (cerebral vascular accident)     Guillain BarrÃ© syndrome     Guillain-[place]     HIV (human immunodeficiency virus infection)     HIV (human immunodeficiency virus infection) from birth

## 2020-10-05 NOTE — ED PROVIDER NOTE - PROGRESS NOTE DETAILS
Antoine: pt signed out to me at 7 am, appreciate PT nora, ROSALINO working on Rehab placement, difficulty 2/2 insurance Antoine: unable to place into rehab today, will place in obs for likely rehab placement tomorrow

## 2020-10-05 NOTE — ED ADULT NURSE NOTE - PMH
Asthma    Guillain BarrÃ© syndrome    Guillain-[place]    HIV (human immunodeficiency virus infection)    HIV (human immunodeficiency virus infection)  from birth   Asthma    CVA (cerebral vascular accident)    Guillain BarrÃ© syndrome    Guillain-[place]    HIV (human immunodeficiency virus infection)    HIV (human immunodeficiency virus infection)  from birth

## 2020-10-05 NOTE — SBIRT NOTE ADULT - NSSBIRTDRGBRIEFINTDET_GEN_A_CORE
Provided SBIRT services: Full screen positive (daily marijuana use). Brief Intervention Performed. Screening results were reviewed with the patient and patient was provided information about healthy guidelines and potential negative consequences associated with level of risk. Motivation and readiness to reduce or stop use was discussed and goals and activities to make changes were suggested/offered.  Tobacco cessation information provided to pt- educated on nicotine replacement options.

## 2020-10-05 NOTE — ED PROVIDER NOTE - CLINICAL SUMMARY MEDICAL DECISION MAKING FREE TEXT BOX
30M hx HIV, also w/ history of recurrent CP of unknown origin s/p negative stress test this year and stable EKG, presents with complaint of chest pain with extension into R rib cage. Pt w/ xray report from [facility] ED showing R 9th&10th rib fractures which on exam are tender to palpation. Will check labs, trop, dimer, cxr. Low suspicion for acute cardiopulmonary process given repeat visits with negative workups however pt w/ significant social issues and health problems warranting thorough w/u. Will consider CT imaging of chest to assess for pneumonia / better visualize rib fractures.

## 2020-10-05 NOTE — PHYSICAL THERAPY INITIAL EVALUATION ADULT - PERTINENT HX OF CURRENT PROBLEM, REHAB EVAL
Patient came in to ED with reports has had recurrent falls; now has chest pain. Reported found to have (R) sided rib pain found to be due to (R) 9&10th rib fractures during visit to [facility] ED 2 days ago. Significant hx of HIV, GBS, CVA, seizures and illicit drug use.

## 2020-10-05 NOTE — PHYSICAL THERAPY INITIAL EVALUATION ADULT - DISCHARGE DISPOSITION, PT EVAL
Sub-Acute Rehab to further improve strength, balance and falls prevention. Patient demonstrates higher level of functional assistance on this encounter, compared to status pre-admission.
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
## 2020-10-05 NOTE — ED PROVIDER NOTE - OBJECTIVE STATEMENT
30M hx HIV, GBS (recurrent, per pt, most recent episode 2/2 'bad' flu shot in 2017), cocaine abuse, presents w/ complaint of chest pain.     Pt laments that he's had this chest pain for nearly six months and has sought medical attention multiple times but "nobody ever has any answers". Pt 30M hx HIV, GBS (per pt, recurrent, most recent episode 2/2 'bad' flu shot in 2017 w/ residual weakness), cocaine abuse, ?seizure/?cva, presents w/ complaint of chest pain.     Pt laments that he's had this chest pain for nearly six months and has sought help multiple times but "nobody ever has any answers". Per chart review, pt has indeed been to [facility] EDs multiple times since early 2020. Serum troponin levels historically always negative. Pt notes he's had a stress test this year which was also negative. Denies unintentional wt loss, fevers, cough, abdominal pain. Endorses using marijuana and smoking cigarettes regularly and most recently, today.     On further questioning pt reports taking a mechanical fall two days ago, tripping on  a curb in [facility]. Denies head trauma or LOC. Says he now has R sided rib pain found to be 2/2 R 9&10th rib fractures during visit to [facility] ED 2 days ago.
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
## 2020-10-05 NOTE — H&P ADULT - NSHPPHYSICALEXAM_GEN_ALL_CORE
Physical exam:  General: patient in no acute distress, resting comfortably  Head:  Atraumatic, Normocephalic  Eyes: EOMI, PERRLA, clear sclera  Neck: Supple, thyroid nontender, non enlarged  Cardio: S1/S2 +ve, regular rate and rhythm, no M/G/R  Resp: clear to ausculation bilaterally, no rales or wheezes  GI: abdomen soft, nontender, non distended, no guarding, BS +ve x 4  Ext: no significant pedal edema  Neuro: CN 2-12 intact, mild to moderate weakness of the lower extremities   Skin: No rashes or lesions

## 2020-10-05 NOTE — PHYSICAL THERAPY INITIAL EVALUATION ADULT - IMPAIRMENTS FOUND, PT EVAL
gross motor/neuromotor development and sensory integration/muscle strength/decreased midline orientation/ergonomics and body mechanics/gait, locomotion, and balance

## 2020-10-05 NOTE — ED ADULT NURSE REASSESSMENT NOTE - NS ED NURSE REASSESS COMMENT FT1
Attempted to ambulate patient at this time unsuccessfully. Patient unable. Dr. Bartholomew made aware

## 2020-10-06 ENCOUNTER — TRANSCRIPTION ENCOUNTER (OUTPATIENT)
Age: 30
End: 2020-10-06

## 2020-10-06 VITALS
TEMPERATURE: 98 F | SYSTOLIC BLOOD PRESSURE: 117 MMHG | OXYGEN SATURATION: 97 % | DIASTOLIC BLOOD PRESSURE: 67 MMHG | RESPIRATION RATE: 18 BRPM | HEART RATE: 65 BPM

## 2020-10-06 LAB
SARS-COV-2 IGG SERPL QL IA: NEGATIVE — SIGNIFICANT CHANGE UP
SARS-COV-2 IGM SERPL IA-ACNC: <0.1 INDEX — SIGNIFICANT CHANGE UP
T PALLIDUM AB TITR SER: NEGATIVE — SIGNIFICANT CHANGE UP
TROPONIN I SERPL-MCNC: <.015 NG/ML — SIGNIFICANT CHANGE UP (ref 0.01–0.04)
TSH SERPL-MCNC: 0.84 UIU/ML — SIGNIFICANT CHANGE UP (ref 0.36–3.74)
VIT B12 SERPL-MCNC: 281 PG/ML — SIGNIFICANT CHANGE UP (ref 232–1245)

## 2020-10-06 PROCEDURE — 99217: CPT

## 2020-10-06 RX ADMIN — TRAMADOL HYDROCHLORIDE 25 MILLIGRAM(S): 50 TABLET ORAL at 01:27

## 2020-10-06 RX ADMIN — TRAMADOL HYDROCHLORIDE 25 MILLIGRAM(S): 50 TABLET ORAL at 02:10
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
## 2020-10-06 NOTE — CHART NOTE - NSCHARTNOTEFT_GEN_A_CORE
Medicine Hospitalist PA    Pt leaving AMA. Explained to pt the risks of leaving against medical advice. . Many attempts have been made to reason with pt to remain in hospital however pt refusing to cooperate and wants to leave AMA. Pt aware of consequences of leaving against medical advice including harm, injury or death. Pt fully understands, all question have been answered. Explained to pt the importance of Following up with the following physicians. Pt agrees to comply.

## 2020-10-09 PROCEDURE — 93010 ELECTROCARDIOGRAM REPORT: CPT

## 2020-10-10 ENCOUNTER — EMERGENCY (EMERGENCY)
Facility: HOSPITAL | Age: 30
LOS: 0 days | Discharge: HOME | End: 2020-10-10
Attending: EMERGENCY MEDICINE
Payer: MEDICAID

## 2020-10-10 VITALS
OXYGEN SATURATION: 100 % | DIASTOLIC BLOOD PRESSURE: 65 MMHG | WEIGHT: 160.06 LBS | RESPIRATION RATE: 18 BRPM | SYSTOLIC BLOOD PRESSURE: 125 MMHG | HEART RATE: 86 BPM | HEIGHT: 71 IN | TEMPERATURE: 99 F

## 2020-10-10 DIAGNOSIS — R07.89 OTHER CHEST PAIN: ICD-10-CM

## 2020-10-10 DIAGNOSIS — Z98.890 OTHER SPECIFIED POSTPROCEDURAL STATES: Chronic | ICD-10-CM

## 2020-10-10 DIAGNOSIS — Z88.1 ALLERGY STATUS TO OTHER ANTIBIOTIC AGENTS STATUS: ICD-10-CM

## 2020-10-10 DIAGNOSIS — J45.909 UNSPECIFIED ASTHMA, UNCOMPLICATED: ICD-10-CM

## 2020-10-10 DIAGNOSIS — R42 DIZZINESS AND GIDDINESS: ICD-10-CM

## 2020-10-10 DIAGNOSIS — G61.0 GUILLAIN-BARRE SYNDROME: ICD-10-CM

## 2020-10-10 DIAGNOSIS — R00.2 PALPITATIONS: ICD-10-CM

## 2020-10-10 DIAGNOSIS — Z86.73 PERSONAL HISTORY OF TRANSIENT ISCHEMIC ATTACK (TIA), AND CEREBRAL INFARCTION WITHOUT RESIDUAL DEFICITS: ICD-10-CM

## 2020-10-10 DIAGNOSIS — Z21 ASYMPTOMATIC HUMAN IMMUNODEFICIENCY VIRUS [HIV] INFECTION STATUS: ICD-10-CM

## 2020-10-10 LAB
ALBUMIN SERPL ELPH-MCNC: 3.8 G/DL — SIGNIFICANT CHANGE UP (ref 3.3–5)
ALP SERPL-CCNC: 85 U/L — SIGNIFICANT CHANGE UP (ref 40–120)
ALT FLD-CCNC: 28 U/L — SIGNIFICANT CHANGE UP (ref 12–78)
ANION GAP SERPL CALC-SCNC: 6 MMOL/L — SIGNIFICANT CHANGE UP (ref 5–17)
AST SERPL-CCNC: 33 U/L — SIGNIFICANT CHANGE UP (ref 15–37)
BASOPHILS # BLD AUTO: 0 K/UL — SIGNIFICANT CHANGE UP (ref 0–0.2)
BASOPHILS NFR BLD AUTO: 0 % — SIGNIFICANT CHANGE UP (ref 0–2)
BILIRUB SERPL-MCNC: 0.5 MG/DL — SIGNIFICANT CHANGE UP (ref 0.2–1.2)
BUN SERPL-MCNC: 13 MG/DL — SIGNIFICANT CHANGE UP (ref 7–23)
CALCIUM SERPL-MCNC: 8.8 MG/DL — SIGNIFICANT CHANGE UP (ref 8.5–10.1)
CHLORIDE SERPL-SCNC: 107 MMOL/L — SIGNIFICANT CHANGE UP (ref 96–108)
CO2 SERPL-SCNC: 28 MMOL/L — SIGNIFICANT CHANGE UP (ref 22–31)
CREAT SERPL-MCNC: 0.96 MG/DL — SIGNIFICANT CHANGE UP (ref 0.5–1.3)
EOSINOPHIL # BLD AUTO: 0.04 K/UL — SIGNIFICANT CHANGE UP (ref 0–0.5)
EOSINOPHIL NFR BLD AUTO: 1.5 % — SIGNIFICANT CHANGE UP (ref 0–6)
GLUCOSE SERPL-MCNC: 113 MG/DL — HIGH (ref 70–99)
HCT VFR BLD CALC: 38.1 % — LOW (ref 39–50)
HGB BLD-MCNC: 12.5 G/DL — LOW (ref 13–17)
LYMPHOCYTES # BLD AUTO: 1.27 K/UL — SIGNIFICANT CHANGE UP (ref 1–3.3)
LYMPHOCYTES # BLD AUTO: 50 % — HIGH (ref 13–44)
MCHC RBC-ENTMCNC: 28.9 PG — SIGNIFICANT CHANGE UP (ref 27–34)
MCHC RBC-ENTMCNC: 32.8 GM/DL — SIGNIFICANT CHANGE UP (ref 32–36)
MCV RBC AUTO: 88.2 FL — SIGNIFICANT CHANGE UP (ref 80–100)
MONOCYTES # BLD AUTO: 0.29 K/UL — SIGNIFICANT CHANGE UP (ref 0–0.9)
MONOCYTES NFR BLD AUTO: 11.5 % — SIGNIFICANT CHANGE UP (ref 2–14)
NEUTROPHILS # BLD AUTO: 0.77 K/UL — LOW (ref 1.8–7.4)
NEUTROPHILS NFR BLD AUTO: 30.5 % — LOW (ref 43–77)
NRBC # BLD: SIGNIFICANT CHANGE UP /100 WBCS (ref 0–0)
PLATELET # BLD AUTO: 228 K/UL — SIGNIFICANT CHANGE UP (ref 150–400)
POTASSIUM SERPL-MCNC: 3.4 MMOL/L — LOW (ref 3.5–5.3)
POTASSIUM SERPL-SCNC: 3.4 MMOL/L — LOW (ref 3.5–5.3)
PROT SERPL-MCNC: 8 GM/DL — SIGNIFICANT CHANGE UP (ref 6–8.3)
RBC # BLD: 4.32 M/UL — SIGNIFICANT CHANGE UP (ref 4.2–5.8)
RBC # FLD: 13 % — SIGNIFICANT CHANGE UP (ref 10.3–14.5)
SODIUM SERPL-SCNC: 141 MMOL/L — SIGNIFICANT CHANGE UP (ref 135–145)
TROPONIN I SERPL-MCNC: <0.015 NG/ML — SIGNIFICANT CHANGE UP (ref 0.01–0.04)
WBC # BLD: 2.54 K/UL — LOW (ref 3.8–10.5)
WBC # FLD AUTO: 2.54 K/UL — LOW (ref 3.8–10.5)

## 2020-10-10 PROCEDURE — 96374 THER/PROPH/DIAG INJ IV PUSH: CPT

## 2020-10-10 PROCEDURE — 99284 EMERGENCY DEPT VISIT MOD MDM: CPT | Mod: 25

## 2020-10-10 PROCEDURE — 85025 COMPLETE CBC W/AUTO DIFF WBC: CPT

## 2020-10-10 PROCEDURE — 71045 X-RAY EXAM CHEST 1 VIEW: CPT | Mod: 26

## 2020-10-10 PROCEDURE — 36415 COLL VENOUS BLD VENIPUNCTURE: CPT

## 2020-10-10 PROCEDURE — 71045 X-RAY EXAM CHEST 1 VIEW: CPT

## 2020-10-10 PROCEDURE — 99283 EMERGENCY DEPT VISIT LOW MDM: CPT

## 2020-10-10 PROCEDURE — 80053 COMPREHEN METABOLIC PANEL: CPT

## 2020-10-10 PROCEDURE — 84484 ASSAY OF TROPONIN QUANT: CPT

## 2020-10-10 RX ORDER — OXYCODONE AND ACETAMINOPHEN 5; 325 MG/1; MG/1
1 TABLET ORAL ONCE
Refills: 0 | Status: DISCONTINUED | OUTPATIENT
Start: 2020-10-10 | End: 2020-10-10

## 2020-10-10 RX ORDER — KETOROLAC TROMETHAMINE 30 MG/ML
15 SYRINGE (ML) INJECTION ONCE
Refills: 0 | Status: DISCONTINUED | OUTPATIENT
Start: 2020-10-10 | End: 2020-10-10

## 2020-10-10 RX ADMIN — OXYCODONE AND ACETAMINOPHEN 1 TABLET(S): 5; 325 TABLET ORAL at 22:07

## 2020-10-10 RX ADMIN — Medication 15 MILLIGRAM(S): at 22:06

## 2020-10-10 NOTE — ED ADULT NURSE NOTE - OBJECTIVE STATEMENT
Pt. BIBA from home with chest pain 10/10 radiating to left lower back and mild SOB with onset 1hr before arrival. pt. with Hx of chest pain evaluated by cardiologist last month. pt. is HIV positive, on baby aspirin daily. Denies palpitations, ESCUDERO, chills and fevers. no

## 2020-10-10 NOTE — ED PROVIDER NOTE - PMH
Asthma    CVA (cerebral vascular accident)    Guillain [place] syndrome    Guillain-[place]    HIV (human immunodeficiency virus infection)    HIV (human immunodeficiency virus infection)  from birth
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
## 2020-10-10 NOTE — ED PROVIDER NOTE - NSFOLLOWUPINSTRUCTIONS_ED_ALL_ED_FT
CHEST PAIN - AfterCare(R) Instructions(ER/ED)           Chest Pain    WHAT YOU NEED TO KNOW:    Chest pain can be caused by a range of conditions, from not serious to life-threatening. Chest pain can be a symptom of a digestive problem, such as acid reflux or a stomach ulcer. An anxiety attack or a strong emotion, such as anger, can also cause chest pain. Infection, inflammation, or a fracture in the bones or cartilage in your chest can cause pain or discomfort. Sometimes chest pain or pressure is caused by poor blood flow to your heart (angina). Chest pain may also be caused by life-threatening conditions such as a heart attack or blood clot in your lungs.     DISCHARGE INSTRUCTIONS:    Call 911 if:   •You have any of the following signs of a heart attack: ?Squeezing, pressure, or pain in your chest      ?You may also have any of the following: ?Discomfort or pain in your back, neck, jaw, stomach, or arm      ?Shortness of breath      ?Nausea or vomiting      ?Lightheadedness or a sudden cold sweat            Return to the emergency department if:   •You have chest discomfort that gets worse, even with medicine.      •You cough or vomit blood.       •Your bowel movements are black or bloody.       •You cannot stop vomiting, or it hurts to swallow.       Contact your healthcare provider if:   •You have questions or concerns about your condition or care.          Medicines:   •Medicines may be given to treat the cause of your chest pain. Examples include pain medicine, anxiety medicine, or medicines to increase blood flow to your heart.       •Do not take certain medicines without asking your healthcare provider first. These include NSAIDs, herbal or vitamin supplements, or hormones (estrogen or progestin).       •Take your medicine as directed. Contact your healthcare provider if you think your medicine is not helping or if you have side effects. Tell him or her if you are allergic to any medicine. Keep a list of the medicines, vitamins, and herbs you take. Include the amounts, and when and why you take them. Bring the list or the pill bottles to follow-up visits. Carry your medicine list with you in case of an emergency.      Follow up with your healthcare provider within 72 hours, or as directed: You may need to return for more tests to find the cause of your chest pain. You may be referred to a specialist, such as a cardiologist or gastroenterologist. Write down your questions so you remember to ask them during your visits.     Healthy living tips: The following are general healthy guidelines. If your chest pain is caused by a heart problem, your healthcare provider will give you specific guidelines to follow.  •Do not smoke. Nicotine and other chemicals in cigarettes and cigars can cause lung and heart damage. Ask your healthcare provider for information if you currently smoke and need help to quit. E-cigarettes or smokeless tobacco still contain nicotine. Talk to your healthcare provider before you use these products.       •Eat a variety of healthy, low-fat, low-salt foods. Healthy foods include fruits, vegetables, whole-grain breads, low-fat dairy products, beans, lean meats, and fish. Ask for more information about a heart healthy diet.      •Drink plenty of water every day. Your body is made of mostly water. Water helps your body to control your temperature and blood pressure. Ask your healthcare provider how much water you should drink every day.      •Ask about activity. Your healthcare provider will tell you which activities to limit or avoid. Ask when you can drive, return to work, and have sex. Ask about the best exercise plan for you.      •Maintain a healthy weight. Ask your healthcare provider how much you should weigh. Ask him or her to help you create a weight loss plan if you are overweight.       •Get the flu and pneumonia vaccines. All adults should get the influenza (flu) vaccine. Get it every year as soon as it becomes available. The pneumococcal vaccine is given to adults aged 65 years or older. The vaccine is given every 5 years to prevent pneumococcal disease, such as pneumonia.      If you have a stent:   •Carry your stent card with you at all times.       •Let all healthcare providers know that you have a stent.          © Copyright [employer] 2020           back to top                          © Copyright [employer] 2020

## 2020-10-10 NOTE — ED PROVIDER NOTE - CLINICAL SUMMARY MEDICAL DECISION MAKING FREE TEXT BOX
pt with chest pain, multiple ED visits in past month for same, multiple neg w/u, will check cxr r/o pna considering recent rib fx, likely pain control, dc. unlikely ACS however will check EKG, + cardiac enzymes

## 2020-10-11 ENCOUNTER — INPATIENT (INPATIENT)
Facility: HOSPITAL | Age: 30
LOS: 2 days | Discharge: HOME | DRG: 137 | End: 2020-10-14
Attending: HOSPITALIST | Admitting: HOSPITALIST
Payer: MEDICAID

## 2020-10-11 VITALS
DIASTOLIC BLOOD PRESSURE: 68 MMHG | OXYGEN SATURATION: 97 % | HEART RATE: 73 BPM | TEMPERATURE: 98 F | HEIGHT: 71 IN | SYSTOLIC BLOOD PRESSURE: 120 MMHG | WEIGHT: 160.06 LBS | RESPIRATION RATE: 18 BRPM

## 2020-10-11 DIAGNOSIS — Z98.890 OTHER SPECIFIED POSTPROCEDURAL STATES: Chronic | ICD-10-CM

## 2020-10-11 DIAGNOSIS — K92.0 HEMATEMESIS: ICD-10-CM

## 2020-10-11 LAB
ALBUMIN SERPL ELPH-MCNC: 3.5 G/DL — SIGNIFICANT CHANGE UP (ref 3.3–5)
ALP SERPL-CCNC: 81 U/L — SIGNIFICANT CHANGE UP (ref 40–120)
ALT FLD-CCNC: 28 U/L — SIGNIFICANT CHANGE UP (ref 12–78)
ANION GAP SERPL CALC-SCNC: 6 MMOL/L — SIGNIFICANT CHANGE UP (ref 5–17)
APPEARANCE UR: CLEAR — SIGNIFICANT CHANGE UP
APTT BLD: 30.5 SEC — SIGNIFICANT CHANGE UP (ref 27.5–35.5)
AST SERPL-CCNC: 34 U/L — SIGNIFICANT CHANGE UP (ref 15–37)
BASOPHILS # BLD AUTO: 0.01 K/UL — SIGNIFICANT CHANGE UP (ref 0–0.2)
BASOPHILS NFR BLD AUTO: 0.4 % — SIGNIFICANT CHANGE UP (ref 0–2)
BILIRUB SERPL-MCNC: 0.4 MG/DL — SIGNIFICANT CHANGE UP (ref 0.2–1.2)
BILIRUB UR-MCNC: NEGATIVE — SIGNIFICANT CHANGE UP
BUN SERPL-MCNC: 13 MG/DL — SIGNIFICANT CHANGE UP (ref 7–23)
CALCIUM SERPL-MCNC: 8.4 MG/DL — LOW (ref 8.5–10.1)
CHLORIDE SERPL-SCNC: 107 MMOL/L — SIGNIFICANT CHANGE UP (ref 96–108)
CO2 SERPL-SCNC: 26 MMOL/L — SIGNIFICANT CHANGE UP (ref 22–31)
COLOR SPEC: YELLOW — SIGNIFICANT CHANGE UP
CREAT SERPL-MCNC: 0.9 MG/DL — SIGNIFICANT CHANGE UP (ref 0.5–1.3)
D DIMER BLD IA.RAPID-MCNC: 196 NG/ML DDU — SIGNIFICANT CHANGE UP
DIFF PNL FLD: NEGATIVE — SIGNIFICANT CHANGE UP
EOSINOPHIL # BLD AUTO: 0.04 K/UL — SIGNIFICANT CHANGE UP (ref 0–0.5)
EOSINOPHIL NFR BLD AUTO: 1.8 % — SIGNIFICANT CHANGE UP (ref 0–6)
GLUCOSE SERPL-MCNC: 84 MG/DL — SIGNIFICANT CHANGE UP (ref 70–99)
GLUCOSE UR QL: NEGATIVE MG/DL — SIGNIFICANT CHANGE UP
HCT VFR BLD CALC: 36.1 % — LOW (ref 39–50)
HGB BLD-MCNC: 11.4 G/DL — LOW (ref 13–17)
IMM GRANULOCYTES NFR BLD AUTO: 0 % — SIGNIFICANT CHANGE UP (ref 0–1.5)
INR BLD: 1.15 RATIO — SIGNIFICANT CHANGE UP (ref 0.88–1.16)
KETONES UR-MCNC: NEGATIVE — SIGNIFICANT CHANGE UP
LEUKOCYTE ESTERASE UR-ACNC: NEGATIVE — SIGNIFICANT CHANGE UP
LIDOCAIN IGE QN: 59 U/L — LOW (ref 73–393)
LYMPHOCYTES # BLD AUTO: 1.4 K/UL — SIGNIFICANT CHANGE UP (ref 1–3.3)
LYMPHOCYTES # BLD AUTO: 62.5 % — HIGH (ref 13–44)
MCHC RBC-ENTMCNC: 28.4 PG — SIGNIFICANT CHANGE UP (ref 27–34)
MCHC RBC-ENTMCNC: 31.6 GM/DL — LOW (ref 32–36)
MCV RBC AUTO: 90 FL — SIGNIFICANT CHANGE UP (ref 80–100)
MONOCYTES # BLD AUTO: 0.41 K/UL — SIGNIFICANT CHANGE UP (ref 0–0.9)
MONOCYTES NFR BLD AUTO: 18.3 % — HIGH (ref 2–14)
NEUTROPHILS # BLD AUTO: 0.38 K/UL — LOW (ref 1.8–7.4)
NEUTROPHILS NFR BLD AUTO: 17 % — LOW (ref 43–77)
NITRITE UR-MCNC: NEGATIVE — SIGNIFICANT CHANGE UP
PCP SPEC-MCNC: SIGNIFICANT CHANGE UP
PH UR: 7 — SIGNIFICANT CHANGE UP (ref 5–8)
PLATELET # BLD AUTO: 221 K/UL — SIGNIFICANT CHANGE UP (ref 150–400)
POTASSIUM SERPL-MCNC: 3.7 MMOL/L — SIGNIFICANT CHANGE UP (ref 3.5–5.3)
POTASSIUM SERPL-SCNC: 3.7 MMOL/L — SIGNIFICANT CHANGE UP (ref 3.5–5.3)
PROT SERPL-MCNC: 7.6 GM/DL — SIGNIFICANT CHANGE UP (ref 6–8.3)
PROT UR-MCNC: NEGATIVE MG/DL — SIGNIFICANT CHANGE UP
PROTHROM AB SERPL-ACNC: 13.3 SEC — SIGNIFICANT CHANGE UP (ref 10.6–13.6)
RBC # BLD: 4.01 M/UL — LOW (ref 4.2–5.8)
RBC # FLD: 12.9 % — SIGNIFICANT CHANGE UP (ref 10.3–14.5)
SARS-COV-2 RNA SPEC QL NAA+PROBE: SIGNIFICANT CHANGE UP
SODIUM SERPL-SCNC: 139 MMOL/L — SIGNIFICANT CHANGE UP (ref 135–145)
SP GR SPEC: 1.01 — SIGNIFICANT CHANGE UP (ref 1.01–1.02)
TROPONIN I SERPL-MCNC: <0.015 NG/ML — SIGNIFICANT CHANGE UP (ref 0.01–0.04)
TROPONIN I SERPL-MCNC: <0.015 NG/ML — SIGNIFICANT CHANGE UP (ref 0.01–0.04)
UROBILINOGEN FLD QL: 1 MG/DL
WBC # BLD: 2.24 K/UL — LOW (ref 3.8–10.5)
WBC # FLD AUTO: 2.24 K/UL — LOW (ref 3.8–10.5)

## 2020-10-11 PROCEDURE — 85027 COMPLETE CBC AUTOMATED: CPT

## 2020-10-11 PROCEDURE — C9113: CPT

## 2020-10-11 PROCEDURE — 84100 ASSAY OF PHOSPHORUS: CPT

## 2020-10-11 PROCEDURE — A9500: CPT

## 2020-10-11 PROCEDURE — 84484 ASSAY OF TROPONIN QUANT: CPT

## 2020-10-11 PROCEDURE — 86357 NK CELLS TOTAL COUNT: CPT

## 2020-10-11 PROCEDURE — 80048 BASIC METABOLIC PNL TOTAL CA: CPT

## 2020-10-11 PROCEDURE — 71046 X-RAY EXAM CHEST 2 VIEWS: CPT

## 2020-10-11 PROCEDURE — U0003: CPT

## 2020-10-11 PROCEDURE — 99497 ADVNCD CARE PLAN 30 MIN: CPT | Mod: 25

## 2020-10-11 PROCEDURE — 83036 HEMOGLOBIN GLYCOSYLATED A1C: CPT

## 2020-10-11 PROCEDURE — 36415 COLL VENOUS BLD VENIPUNCTURE: CPT

## 2020-10-11 PROCEDURE — 93010 ELECTROCARDIOGRAM REPORT: CPT

## 2020-10-11 PROCEDURE — 86360 T CELL ABSOLUTE COUNT/RATIO: CPT

## 2020-10-11 PROCEDURE — 80074 ACUTE HEPATITIS PANEL: CPT

## 2020-10-11 PROCEDURE — 85379 FIBRIN DEGRADATION QUANT: CPT

## 2020-10-11 PROCEDURE — 87521 HEPATITIS C PROBE&RVRS TRNSC: CPT

## 2020-10-11 PROCEDURE — 80053 COMPREHEN METABOLIC PANEL: CPT

## 2020-10-11 PROCEDURE — 85025 COMPLETE CBC W/AUTO DIFF WBC: CPT

## 2020-10-11 PROCEDURE — 80061 LIPID PANEL: CPT

## 2020-10-11 PROCEDURE — 99223 1ST HOSP IP/OBS HIGH 75: CPT

## 2020-10-11 PROCEDURE — 0225U NFCT DS DNA&RNA 21 SARSCOV2: CPT

## 2020-10-11 PROCEDURE — 86359 T CELLS TOTAL COUNT: CPT

## 2020-10-11 PROCEDURE — 87040 BLOOD CULTURE FOR BACTERIA: CPT

## 2020-10-11 PROCEDURE — 93005 ELECTROCARDIOGRAM TRACING: CPT

## 2020-10-11 PROCEDURE — 93306 TTE W/DOPPLER COMPLETE: CPT

## 2020-10-11 PROCEDURE — 85652 RBC SED RATE AUTOMATED: CPT

## 2020-10-11 PROCEDURE — 81003 URINALYSIS AUTO W/O SCOPE: CPT

## 2020-10-11 PROCEDURE — 86355 B CELLS TOTAL COUNT: CPT

## 2020-10-11 PROCEDURE — 80307 DRUG TEST PRSMV CHEM ANLYZR: CPT

## 2020-10-11 PROCEDURE — 86140 C-REACTIVE PROTEIN: CPT

## 2020-10-11 PROCEDURE — 78452 HT MUSCLE IMAGE SPECT MULT: CPT

## 2020-10-11 PROCEDURE — 86769 SARS-COV-2 COVID-19 ANTIBODY: CPT

## 2020-10-11 PROCEDURE — 83735 ASSAY OF MAGNESIUM: CPT

## 2020-10-11 PROCEDURE — 71045 X-RAY EXAM CHEST 1 VIEW: CPT | Mod: 26

## 2020-10-11 RX ORDER — FAMOTIDINE 10 MG/ML
20 INJECTION INTRAVENOUS ONCE
Refills: 0 | Status: COMPLETED | OUTPATIENT
Start: 2020-10-11 | End: 2020-10-11

## 2020-10-11 RX ORDER — PANTOPRAZOLE SODIUM 20 MG/1
40 TABLET, DELAYED RELEASE ORAL ONCE
Refills: 0 | Status: COMPLETED | OUTPATIENT
Start: 2020-10-11 | End: 2020-10-11

## 2020-10-11 RX ORDER — NICOTINE POLACRILEX 2 MG
1 GUM BUCCAL DAILY
Refills: 0 | Status: DISCONTINUED | OUTPATIENT
Start: 2020-10-11 | End: 2020-10-14

## 2020-10-11 RX ORDER — PIPERACILLIN AND TAZOBACTAM 4; .5 G/20ML; G/20ML
3.38 INJECTION, POWDER, LYOPHILIZED, FOR SOLUTION INTRAVENOUS ONCE
Refills: 0 | Status: DISCONTINUED | OUTPATIENT
Start: 2020-10-11 | End: 2020-10-11

## 2020-10-11 RX ORDER — PANTOPRAZOLE SODIUM 20 MG/1
40 TABLET, DELAYED RELEASE ORAL
Refills: 0 | Status: DISCONTINUED | OUTPATIENT
Start: 2020-10-11 | End: 2020-10-14

## 2020-10-11 RX ORDER — OXYCODONE AND ACETAMINOPHEN 5; 325 MG/1; MG/1
1 TABLET ORAL EVERY 6 HOURS
Refills: 0 | Status: DISCONTINUED | OUTPATIENT
Start: 2020-10-11 | End: 2020-10-14

## 2020-10-11 RX ORDER — VANCOMYCIN HCL 1 G
1000 VIAL (EA) INTRAVENOUS ONCE
Refills: 0 | Status: DISCONTINUED | OUTPATIENT
Start: 2020-10-11 | End: 2020-10-11

## 2020-10-11 RX ORDER — ONDANSETRON 8 MG/1
4 TABLET, FILM COATED ORAL EVERY 4 HOURS
Refills: 0 | Status: DISCONTINUED | OUTPATIENT
Start: 2020-10-11 | End: 2020-10-14

## 2020-10-11 RX ORDER — IBUPROFEN 200 MG
400 TABLET ORAL EVERY 6 HOURS
Refills: 0 | Status: DISCONTINUED | OUTPATIENT
Start: 2020-10-11 | End: 2020-10-12

## 2020-10-11 RX ORDER — ALBUTEROL 90 UG/1
1 AEROSOL, METERED ORAL EVERY 4 HOURS
Refills: 0 | Status: DISCONTINUED | OUTPATIENT
Start: 2020-10-11 | End: 2020-10-14

## 2020-10-11 RX ORDER — QUETIAPINE FUMARATE 200 MG/1
0 TABLET, FILM COATED ORAL
Qty: 0 | Refills: 0 | DISCHARGE

## 2020-10-11 RX ORDER — SODIUM CHLORIDE 9 MG/ML
1000 INJECTION INTRAMUSCULAR; INTRAVENOUS; SUBCUTANEOUS ONCE
Refills: 0 | Status: COMPLETED | OUTPATIENT
Start: 2020-10-11 | End: 2020-10-11

## 2020-10-11 RX ORDER — ELVITEGRAVIR, COBICISTAT, EMTRICITABINE, AND TENOFOVIR ALAFENAMIDE 150; 150; 200; 10 MG/1; MG/1; MG/1; MG/1
1 TABLET ORAL
Qty: 0 | Refills: 0 | DISCHARGE

## 2020-10-11 RX ORDER — ACETAMINOPHEN 500 MG
650 TABLET ORAL EVERY 6 HOURS
Refills: 0 | Status: DISCONTINUED | OUTPATIENT
Start: 2020-10-11 | End: 2020-10-14

## 2020-10-11 RX ADMIN — SODIUM CHLORIDE 1000 MILLILITER(S): 9 INJECTION INTRAMUSCULAR; INTRAVENOUS; SUBCUTANEOUS at 16:35

## 2020-10-11 RX ADMIN — FAMOTIDINE 20 MILLIGRAM(S): 10 INJECTION INTRAVENOUS at 16:35

## 2020-10-11 RX ADMIN — PANTOPRAZOLE SODIUM 40 MILLIGRAM(S): 20 TABLET, DELAYED RELEASE ORAL at 22:19

## 2020-10-11 RX ADMIN — PANTOPRAZOLE SODIUM 40 MILLIGRAM(S): 20 TABLET, DELAYED RELEASE ORAL at 16:35

## 2020-10-11 RX ADMIN — OXYCODONE AND ACETAMINOPHEN 1 TABLET(S): 5; 325 TABLET ORAL at 22:19

## 2020-10-11 NOTE — ED ADULT TRIAGE NOTE - CHIEF COMPLAINT QUOTE
Patient with chest pain on left side of chest and vomiting blood.  Patient was here yesterday for similar symptoms.  Hx of stroke in April.
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
## 2020-10-11 NOTE — H&P ADULT - ASSESSMENT
Pt is a 31 yo male with a pmh/o HIV, now non compliant for over one month after being on Genvoya, Asthma, marijuana/tobacco/cocaine use, who presents to ED today due to episode of hematemesis associated with two days of loose stool, productive cough with green sputum, shortness of breath, and worsening chest pain in setting of abnormal CXR showing pneumonia and with recent CTA showing rib fractures/effusion.      Atypical chest pain   likely pleuritic in nature given rib fx/PNA  holding ASA given concern for UGIB  pt non compliant with statin at home, was previously d/c'd on asa/statin  recent elevated ddimer, neg CTA for PE  repeat Ddimer, other than smoking pt w/o risk factors for PE, will consider CTA pending results  trend troponin  Cardiology consult however given presentation less concerning for cardiac etiology, and more likely pleurisy, f/u rpt TTE  EKG unchanged NSR, no STT abnormalities  pain control with motrin/tylenol  oxycodone for severe pain only with plan to taper off  last TTE on 3/15/20: Summary:   1. Mobile intra-atrial septum.   2. Intravenous injection of agitated saline demonstrates the presence of a patent foramen ovale.   3. There is mild concentric left ventricular hypertrophy.   4. Left ventricular ejection fraction, by visual estimation, is 65 to 70%.   5. Normal right ventricular size and function.   6. Mild mitral valve regurgitation.   7. There is no evidence of pericardial effusion.  cont to monitor on telemetry  f/u AM cbc, bmp, mg, phos      Community acquired PNA   levaquin ordered given allergy profile  ID consulted  await COVID results- has tested neg in past  albuterol prn  incentive spirometry  OOB to chair encouraged  tylenol prn fever    Upper GI bleeding/loose stool  GI pcr given HIV status and risk  no recent abx use, no diarrhea  protonix bid  NPO except meds     HIV with non compliance  ID consulted  no longer has ID f/u    Polysubstance abuse  SW consult  nicotene patch  Utox pending  monitor closely for s/s cocaine WD

## 2020-10-11 NOTE — ED PROVIDER NOTE - PROGRESS NOTE DETAILS
D/c'd abx per Hospitalist until XR was compared to previous studies by them. Stable pending admission.
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
## 2020-10-11 NOTE — H&P ADULT - RS GEN PE MLT RESP DETAILS PC
diminished breath sounds, L/no rhonchi/no wheezes/diminished breath sounds, R/chest wall tenderness/breath sounds equal/respirations non-labored
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
## 2020-10-11 NOTE — H&P ADULT - NSHPSOCIALHISTORY_GEN_ALL_CORE
ADL independent  no etoh use  current everyday smoker  cocaine use  marijuana use  unemployed  homeless

## 2020-10-11 NOTE — ED PROVIDER NOTE - NS_ ATTENDINGSCRIBEDETAILS _ED_A_ED_FT
I, Collins Morrison MD,  performed the initial face to face bedside interview with this patient regarding history of present illness, review of symptoms and relevant past medical, social and family history.  I completed an independent physical examination.    The history, relevant review of systems, past medical and surgical history, medical decision making, and physical examination was documented by the scribe in my presence and I attest to the accuracy of the documentation.
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
## 2020-10-12 DIAGNOSIS — R07.9 CHEST PAIN, UNSPECIFIED: ICD-10-CM

## 2020-10-12 LAB
A1C WITH ESTIMATED AVERAGE GLUCOSE RESULT: 4.9 % — SIGNIFICANT CHANGE UP (ref 4–5.6)
ALBUMIN SERPL ELPH-MCNC: 3.1 G/DL — LOW (ref 3.3–5)
ALP SERPL-CCNC: 77 U/L — SIGNIFICANT CHANGE UP (ref 40–120)
ALT FLD-CCNC: 29 U/L — SIGNIFICANT CHANGE UP (ref 12–78)
ANION GAP SERPL CALC-SCNC: 4 MMOL/L — LOW (ref 5–17)
AST SERPL-CCNC: 37 U/L — SIGNIFICANT CHANGE UP (ref 15–37)
BASOPHILS # BLD AUTO: 0.01 K/UL — SIGNIFICANT CHANGE UP (ref 0–0.2)
BASOPHILS NFR BLD AUTO: 0.6 % — SIGNIFICANT CHANGE UP (ref 0–2)
BILIRUB SERPL-MCNC: 0.3 MG/DL — SIGNIFICANT CHANGE UP (ref 0.2–1.2)
BUN SERPL-MCNC: 10 MG/DL — SIGNIFICANT CHANGE UP (ref 7–23)
CALCIUM SERPL-MCNC: 8.1 MG/DL — LOW (ref 8.5–10.1)
CHLORIDE SERPL-SCNC: 110 MMOL/L — HIGH (ref 96–108)
CHOLEST SERPL-MCNC: 123 MG/DL — SIGNIFICANT CHANGE UP (ref 10–199)
CO2 SERPL-SCNC: 26 MMOL/L — SIGNIFICANT CHANGE UP (ref 22–31)
CREAT SERPL-MCNC: 0.9 MG/DL — SIGNIFICANT CHANGE UP (ref 0.5–1.3)
CRP SERPL-MCNC: <0.1 MG/DL — SIGNIFICANT CHANGE UP (ref 0–0.4)
EOSINOPHIL # BLD AUTO: 0.06 K/UL — SIGNIFICANT CHANGE UP (ref 0–0.5)
EOSINOPHIL NFR BLD AUTO: 3.4 % — SIGNIFICANT CHANGE UP (ref 0–6)
ERYTHROCYTE [SEDIMENTATION RATE] IN BLOOD: 20 MM/HR — HIGH (ref 0–15)
ESTIMATED AVERAGE GLUCOSE: 94 MG/DL — SIGNIFICANT CHANGE UP (ref 68–114)
GLUCOSE SERPL-MCNC: 89 MG/DL — SIGNIFICANT CHANGE UP (ref 70–99)
HAV IGM SER-ACNC: SIGNIFICANT CHANGE UP
HBV CORE IGM SER-ACNC: SIGNIFICANT CHANGE UP
HBV SURFACE AG SER-ACNC: SIGNIFICANT CHANGE UP
HCT VFR BLD CALC: 36.1 % — LOW (ref 39–50)
HCV AB S/CO SERPL IA: 1.17 S/CO — HIGH (ref 0–0.99)
HCV AB SERPL-IMP: ABNORMAL
HDLC SERPL-MCNC: 36 MG/DL — LOW
HGB BLD-MCNC: 11.3 G/DL — LOW (ref 13–17)
IMM GRANULOCYTES NFR BLD AUTO: 0 % — SIGNIFICANT CHANGE UP (ref 0–1.5)
LIPID PNL WITH DIRECT LDL SERPL: 72 MG/DL — SIGNIFICANT CHANGE UP
LYMPHOCYTES # BLD AUTO: 1.21 K/UL — SIGNIFICANT CHANGE UP (ref 1–3.3)
LYMPHOCYTES # BLD AUTO: 67.6 % — HIGH (ref 13–44)
MAGNESIUM SERPL-MCNC: 1.6 MG/DL — SIGNIFICANT CHANGE UP (ref 1.6–2.6)
MCHC RBC-ENTMCNC: 28.5 PG — SIGNIFICANT CHANGE UP (ref 27–34)
MCHC RBC-ENTMCNC: 31.3 GM/DL — LOW (ref 32–36)
MCV RBC AUTO: 90.9 FL — SIGNIFICANT CHANGE UP (ref 80–100)
MONOCYTES # BLD AUTO: 0.28 K/UL — SIGNIFICANT CHANGE UP (ref 0–0.9)
MONOCYTES NFR BLD AUTO: 15.6 % — HIGH (ref 2–14)
NEUTROPHILS # BLD AUTO: 0.23 K/UL — LOW (ref 1.8–7.4)
NEUTROPHILS NFR BLD AUTO: 12.8 % — LOW (ref 43–77)
PHOSPHATE SERPL-MCNC: 3.3 MG/DL — SIGNIFICANT CHANGE UP (ref 2.5–4.5)
PLATELET # BLD AUTO: 206 K/UL — SIGNIFICANT CHANGE UP (ref 150–400)
POTASSIUM SERPL-MCNC: 4 MMOL/L — SIGNIFICANT CHANGE UP (ref 3.5–5.3)
POTASSIUM SERPL-SCNC: 4 MMOL/L — SIGNIFICANT CHANGE UP (ref 3.5–5.3)
PROT SERPL-MCNC: 7.1 GM/DL — SIGNIFICANT CHANGE UP (ref 6–8.3)
RAPID RVP RESULT: SIGNIFICANT CHANGE UP
RBC # BLD: 3.97 M/UL — LOW (ref 4.2–5.8)
RBC # FLD: 12.9 % — SIGNIFICANT CHANGE UP (ref 10.3–14.5)
SARS-COV-2 IGG SERPL QL IA: NEGATIVE — SIGNIFICANT CHANGE UP
SARS-COV-2 IGM SERPL IA-ACNC: 0.08 INDEX — SIGNIFICANT CHANGE UP
SODIUM SERPL-SCNC: 140 MMOL/L — SIGNIFICANT CHANGE UP (ref 135–145)
TOTAL CHOLESTEROL/HDL RATIO MEASUREMENT: 3.4 RATIO — SIGNIFICANT CHANGE UP (ref 3.4–9.6)
TRIGL SERPL-MCNC: 74 MG/DL — SIGNIFICANT CHANGE UP (ref 10–149)
WBC # BLD: 1.79 K/UL — LOW (ref 3.8–10.5)
WBC # FLD AUTO: 1.79 K/UL — LOW (ref 3.8–10.5)

## 2020-10-12 PROCEDURE — 93306 TTE W/DOPPLER COMPLETE: CPT | Mod: 26

## 2020-10-12 PROCEDURE — 99223 1ST HOSP IP/OBS HIGH 75: CPT

## 2020-10-12 PROCEDURE — 71046 X-RAY EXAM CHEST 2 VIEWS: CPT | Mod: 26

## 2020-10-12 PROCEDURE — 99233 SBSQ HOSP IP/OBS HIGH 50: CPT

## 2020-10-12 RX ORDER — LANOLIN ALCOHOL/MO/W.PET/CERES
5 CREAM (GRAM) TOPICAL AT BEDTIME
Refills: 0 | Status: DISCONTINUED | OUTPATIENT
Start: 2020-10-12 | End: 2020-10-14

## 2020-10-12 RX ORDER — LACTOBACILLUS ACIDOPHILUS 100MM CELL
1 CAPSULE ORAL
Refills: 0 | Status: DISCONTINUED | OUTPATIENT
Start: 2020-10-12 | End: 2020-10-14

## 2020-10-12 RX ORDER — ASPIRIN/CALCIUM CARB/MAGNESIUM 324 MG
81 TABLET ORAL DAILY
Refills: 0 | Status: DISCONTINUED | OUTPATIENT
Start: 2020-10-12 | End: 2020-10-12

## 2020-10-12 RX ORDER — ATORVASTATIN CALCIUM 80 MG/1
20 TABLET, FILM COATED ORAL AT BEDTIME
Refills: 0 | Status: DISCONTINUED | OUTPATIENT
Start: 2020-10-12 | End: 2020-10-14

## 2020-10-12 RX ORDER — LIDOCAINE 4 G/100G
1 CREAM TOPICAL DAILY
Refills: 0 | Status: DISCONTINUED | OUTPATIENT
Start: 2020-10-12 | End: 2020-10-14

## 2020-10-12 RX ADMIN — LIDOCAINE 1 PATCH: 4 CREAM TOPICAL at 16:26

## 2020-10-12 RX ADMIN — OXYCODONE AND ACETAMINOPHEN 1 TABLET(S): 5; 325 TABLET ORAL at 10:48

## 2020-10-12 RX ADMIN — OXYCODONE AND ACETAMINOPHEN 1 TABLET(S): 5; 325 TABLET ORAL at 22:22

## 2020-10-12 RX ADMIN — OXYCODONE AND ACETAMINOPHEN 1 TABLET(S): 5; 325 TABLET ORAL at 23:22

## 2020-10-12 RX ADMIN — LIDOCAINE 1 PATCH: 4 CREAM TOPICAL at 10:49

## 2020-10-12 RX ADMIN — OXYCODONE AND ACETAMINOPHEN 1 TABLET(S): 5; 325 TABLET ORAL at 11:20

## 2020-10-12 RX ADMIN — Medication 1 PATCH: at 16:26

## 2020-10-12 RX ADMIN — Medication 5 MILLIGRAM(S): at 23:26

## 2020-10-12 RX ADMIN — OXYCODONE AND ACETAMINOPHEN 1 TABLET(S): 5; 325 TABLET ORAL at 16:21

## 2020-10-12 RX ADMIN — PANTOPRAZOLE SODIUM 40 MILLIGRAM(S): 20 TABLET, DELAYED RELEASE ORAL at 10:51

## 2020-10-12 RX ADMIN — LIDOCAINE 1 PATCH: 4 CREAM TOPICAL at 22:23

## 2020-10-12 RX ADMIN — ATORVASTATIN CALCIUM 20 MILLIGRAM(S): 80 TABLET, FILM COATED ORAL at 22:16

## 2020-10-12 RX ADMIN — OXYCODONE AND ACETAMINOPHEN 1 TABLET(S): 5; 325 TABLET ORAL at 16:25

## 2020-10-12 RX ADMIN — Medication 1 PATCH: at 10:48

## 2020-10-12 RX ADMIN — PANTOPRAZOLE SODIUM 40 MILLIGRAM(S): 20 TABLET, DELAYED RELEASE ORAL at 22:16

## 2020-10-12 RX ADMIN — Medication 1 TABLET(S): at 16:22
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
## 2020-10-12 NOTE — PATIENT PROFILE ADULT - MEDICATION/VISITS - DETAILS
non complaint, did not  last script given when discharged previously non compliant, did not  last script given when discharged previously
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
## 2020-10-12 NOTE — CONSULT NOTE ADULT - PROBLEM SELECTOR RECOMMENDATION 9
Chest pain unlikely to be CAD related or even from ischemia. No changes seen on ECG and tropinins negative and no major risk factors other than drug use and family history for which he is quite young.  Chest pain description is more pleuritic and likely related to pulmonary process he is having. Echo ordered and will recommend and ECG stress test once better from his infection.

## 2020-10-13 ENCOUNTER — TRANSCRIPTION ENCOUNTER (OUTPATIENT)
Age: 30
End: 2020-10-13

## 2020-10-13 VITALS
HEART RATE: 72 BPM | DIASTOLIC BLOOD PRESSURE: 70 MMHG | OXYGEN SATURATION: 100 % | SYSTOLIC BLOOD PRESSURE: 123 MMHG | TEMPERATURE: 98 F | RESPIRATION RATE: 17 BRPM

## 2020-10-13 DIAGNOSIS — B20 HUMAN IMMUNODEFICIENCY VIRUS [HIV] DISEASE: ICD-10-CM

## 2020-10-13 DIAGNOSIS — S22.41XA MULTIPLE FRACTURES OF RIBS, RIGHT SIDE, INITIAL ENCOUNTER FOR CLOSED FRACTURE: ICD-10-CM

## 2020-10-13 LAB
4/8 RATIO: 0.19 RATIO — LOW (ref 0.9–3.6)
ABS CD8: 745 /UL — HIGH (ref 142–740)
ANION GAP SERPL CALC-SCNC: 4 MMOL/L — LOW (ref 5–17)
BUN SERPL-MCNC: 6 MG/DL — LOW (ref 7–23)
CALCIUM SERPL-MCNC: 8.8 MG/DL — SIGNIFICANT CHANGE UP (ref 8.5–10.1)
CD16+CD56+ CELLS NFR BLD: 5 % — SIGNIFICANT CHANGE UP (ref 5–23)
CD16+CD56+ CELLS NFR SPEC: 47 /UL — LOW (ref 71–410)
CD19 BLASTS SPEC-ACNC: 7 % — SIGNIFICANT CHANGE UP (ref 6–24)
CD19 BLASTS SPEC-ACNC: 76 /UL — LOW (ref 84–469)
CD3 BLASTS SPEC-ACNC: 88 % — HIGH (ref 59–83)
CD3 BLASTS SPEC-ACNC: 911 /UL — SIGNIFICANT CHANGE UP (ref 672–1870)
CD4 %: 14 % — LOW (ref 30–62)
CD8 %: 71 % — HIGH (ref 12–36)
CHLORIDE SERPL-SCNC: 108 MMOL/L — SIGNIFICANT CHANGE UP (ref 96–108)
CO2 SERPL-SCNC: 27 MMOL/L — SIGNIFICANT CHANGE UP (ref 22–31)
CREAT SERPL-MCNC: 0.82 MG/DL — SIGNIFICANT CHANGE UP (ref 0.5–1.3)
GLUCOSE SERPL-MCNC: 92 MG/DL — SIGNIFICANT CHANGE UP (ref 70–99)
HCT VFR BLD CALC: 40.3 % — SIGNIFICANT CHANGE UP (ref 39–50)
HGB BLD-MCNC: 12.6 G/DL — LOW (ref 13–17)
MCHC RBC-ENTMCNC: 28.3 PG — SIGNIFICANT CHANGE UP (ref 27–34)
MCHC RBC-ENTMCNC: 31.3 GM/DL — LOW (ref 32–36)
MCV RBC AUTO: 90.6 FL — SIGNIFICANT CHANGE UP (ref 80–100)
PLATELET # BLD AUTO: 200 K/UL — SIGNIFICANT CHANGE UP (ref 150–400)
POTASSIUM SERPL-MCNC: 3.9 MMOL/L — SIGNIFICANT CHANGE UP (ref 3.5–5.3)
POTASSIUM SERPL-SCNC: 3.9 MMOL/L — SIGNIFICANT CHANGE UP (ref 3.5–5.3)
RBC # BLD: 4.45 M/UL — SIGNIFICANT CHANGE UP (ref 4.2–5.8)
RBC # FLD: 12.6 % — SIGNIFICANT CHANGE UP (ref 10.3–14.5)
SODIUM SERPL-SCNC: 139 MMOL/L — SIGNIFICANT CHANGE UP (ref 135–145)
T-CELL CD4 SUBSET PNL BLD: 144 /UL — LOW (ref 489–1457)
TROPONIN I SERPL-MCNC: <0.015 NG/ML — SIGNIFICANT CHANGE UP (ref 0.01–0.04)
WBC # BLD: 2.09 K/UL — LOW (ref 3.8–10.5)
WBC # FLD AUTO: 2.09 K/UL — LOW (ref 3.8–10.5)

## 2020-10-13 PROCEDURE — 93010 ELECTROCARDIOGRAM REPORT: CPT

## 2020-10-13 PROCEDURE — 99232 SBSQ HOSP IP/OBS MODERATE 35: CPT

## 2020-10-13 PROCEDURE — 99233 SBSQ HOSP IP/OBS HIGH 50: CPT

## 2020-10-13 RX ORDER — ASPIRIN/CALCIUM CARB/MAGNESIUM 324 MG
81 TABLET ORAL DAILY
Refills: 0 | Status: DISCONTINUED | OUTPATIENT
Start: 2020-10-13 | End: 2020-10-14

## 2020-10-13 RX ORDER — IBUPROFEN 200 MG
600 TABLET ORAL ONCE
Refills: 0 | Status: COMPLETED | OUTPATIENT
Start: 2020-10-13 | End: 2020-10-13

## 2020-10-13 RX ADMIN — LIDOCAINE 1 PATCH: 4 CREAM TOPICAL at 19:30

## 2020-10-13 RX ADMIN — Medication 600 MILLIGRAM(S): at 20:15

## 2020-10-13 RX ADMIN — Medication 1 TABLET(S): at 17:31

## 2020-10-13 RX ADMIN — OXYCODONE AND ACETAMINOPHEN 1 TABLET(S): 5; 325 TABLET ORAL at 09:24

## 2020-10-13 RX ADMIN — OXYCODONE AND ACETAMINOPHEN 1 TABLET(S): 5; 325 TABLET ORAL at 08:54

## 2020-10-13 RX ADMIN — PANTOPRAZOLE SODIUM 40 MILLIGRAM(S): 20 TABLET, DELAYED RELEASE ORAL at 08:53

## 2020-10-13 RX ADMIN — Medication 1 TABLET(S): at 08:53

## 2020-10-13 RX ADMIN — Medication 81 MILLIGRAM(S): at 17:30

## 2020-10-13 RX ADMIN — Medication 600 MILLIGRAM(S): at 21:44

## 2020-10-13 RX ADMIN — OXYCODONE AND ACETAMINOPHEN 1 TABLET(S): 5; 325 TABLET ORAL at 17:30

## 2020-10-13 RX ADMIN — LIDOCAINE 1 PATCH: 4 CREAM TOPICAL at 08:55

## 2020-10-13 RX ADMIN — LIDOCAINE 1 PATCH: 4 CREAM TOPICAL at 21:00

## 2020-10-13 RX ADMIN — Medication 5 MILLIGRAM(S): at 21:49

## 2020-10-13 RX ADMIN — OXYCODONE AND ACETAMINOPHEN 1 TABLET(S): 5; 325 TABLET ORAL at 23:33

## 2020-10-13 RX ADMIN — PANTOPRAZOLE SODIUM 40 MILLIGRAM(S): 20 TABLET, DELAYED RELEASE ORAL at 21:47

## 2020-10-13 RX ADMIN — ATORVASTATIN CALCIUM 20 MILLIGRAM(S): 80 TABLET, FILM COATED ORAL at 21:47
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
## 2020-10-13 NOTE — PROGRESS NOTE ADULT - PROBLEM SELECTOR PROBLEM 3
Pharmacy is correct
Pt was seen by dr Merary Overton today, cholesterol medicine was supposed to be rx'd    Please send to [facility] [address]  Call back upon completion
HIV (human immunodeficiency virus infection)
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
## 2020-10-13 NOTE — DISCHARGE NOTE NURSING/CASE MANAGEMENT/SOCIAL WORK - NSDCPEEMAIL_GEN_ALL_CORE
[facility] for Tobacco Control email [email or web address]

## 2020-10-13 NOTE — DISCHARGE NOTE NURSING/CASE MANAGEMENT/SOCIAL WORK - PATIENT PORTAL LINK FT
You can access the FollowMyHealth Patient Portal offered by [facility] by registering at the following website: [email or web address]. By joining [employer]’s FollowMyHealth portal, you will also be able to view your health information using other applications (apps) compatible with our system.

## 2020-10-13 NOTE — DISCHARGE NOTE NURSING/CASE MANAGEMENT/SOCIAL WORK - NSDCPEWEB_GEN_ALL_CORE
[facility] for Tobacco Control website --- [email or web address] website --- [email or web address]

## 2020-10-13 NOTE — DISCHARGE NOTE NURSING/CASE MANAGEMENT/SOCIAL WORK - NSDCFUADDAPPT_GEN_ALL_CORE_FT
3 mm *** APPOINTMENT: TUESDAY OCTOBER 27, 2020 10 a.m. with Dr Valenzuela; [facility] [phone] [address] [id number]
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
## 2020-10-14 ENCOUNTER — TRANSCRIPTION ENCOUNTER (OUTPATIENT)
Age: 30
End: 2020-10-14

## 2020-10-14 PROCEDURE — 99239 HOSP IP/OBS DSCHRG MGMT >30: CPT

## 2020-10-14 RX ORDER — AZITHROMYCIN 500 MG/1
2 TABLET, FILM COATED ORAL
Qty: 9 | Refills: 0
Start: 2020-10-14 | End: 2020-11-12

## 2020-10-14 RX ORDER — PANTOPRAZOLE SODIUM 20 MG/1
1 TABLET, DELAYED RELEASE ORAL
Qty: 30 | Refills: 0
Start: 2020-10-14 | End: 2020-11-12

## 2020-10-14 RX ORDER — ASPIRIN/CALCIUM CARB/MAGNESIUM 324 MG
1 TABLET ORAL
Qty: 30 | Refills: 0
Start: 2020-10-14 | End: 2020-11-12

## 2020-10-14 RX ORDER — ATORVASTATIN CALCIUM 80 MG/1
1 TABLET, FILM COATED ORAL
Qty: 30 | Refills: 0
Start: 2020-10-14 | End: 2020-11-12

## 2020-10-14 RX ORDER — NICOTINE POLACRILEX 2 MG
1 GUM BUCCAL
Qty: 7 | Refills: 0
Start: 2020-10-14 | End: 2020-10-20

## 2020-10-14 RX ORDER — PANTOPRAZOLE SODIUM 20 MG/1
40 TABLET, DELAYED RELEASE ORAL DAILY
Refills: 0 | Status: DISCONTINUED | OUTPATIENT
Start: 2020-10-14 | End: 2020-10-14

## 2020-10-14 RX ORDER — AZITHROMYCIN 500 MG/1
1200 TABLET, FILM COATED ORAL
Refills: 0 | Status: DISCONTINUED | OUTPATIENT
Start: 2020-10-14 | End: 2020-10-14

## 2020-10-14 RX ADMIN — Medication 81 MILLIGRAM(S): at 10:41

## 2020-10-14 RX ADMIN — PANTOPRAZOLE SODIUM 40 MILLIGRAM(S): 20 TABLET, DELAYED RELEASE ORAL at 10:41

## 2020-10-14 RX ADMIN — Medication 1 PATCH: at 10:42

## 2020-10-14 RX ADMIN — Medication 1 TABLET(S): at 10:41

## 2020-10-14 RX ADMIN — OXYCODONE AND ACETAMINOPHEN 1 TABLET(S): 5; 325 TABLET ORAL at 12:29

## 2020-10-14 RX ADMIN — LIDOCAINE 1 PATCH: 4 CREAM TOPICAL at 10:41

## 2020-10-14 NOTE — DISCHARGE NOTE PROVIDER - NSDCFUADDAPPT_GEN_ALL_CORE_FT
*** APPOINTMENT: TUESDAY OCTOBER 27, 2020 10 a.m. with Dr Valenzuela; [facility] [phone] [address] [id number]

## 2020-10-14 NOTE — DISCHARGE NOTE PROVIDER - NSDCMRMEDTOKEN_GEN_ALL_CORE_FT
aspirin 81 mg oral delayed release tablet: 1 tab(s) orally once a day  atorvastatin 20 mg oral tablet: 1 tab(s) orally once a day (at bedtime)  azithromycin 600 mg oral tablet: 2 tab(s) orally once a week - Every Wednesday - for PCP prophylaxsis   levoFLOXacin 500 mg oral tablet: 1 tab(s) orally every 24 hours  nicotine 14 mg/24 hr transdermal film, extended release: 1 patch transdermal once a day  pantoprazole 40 mg oral delayed release tablet: 1 tab(s) orally once a day  sulfamethoxazole-trimethoprim 800 mg-160 mg oral tablet: 1 tab(s) orally Monday, Wednesday, and Friday - for PCP prophylaxsis    aspirin 81 mg oral delayed release tablet: 1 tab(s) orally once a day  atorvastatin 20 mg oral tablet: 1 tab(s) orally once a day (at bedtime)  levoFLOXacin 500 mg oral tablet: 1 tab(s) orally every 24 hours  nicotine 14 mg/24 hr transdermal film, extended release: 1 patch transdermal once a day  pantoprazole 40 mg oral delayed release tablet: 1 tab(s) orally once a day  sulfamethoxazole-trimethoprim 800 mg-160 mg oral tablet: 1 tab(s) orally Monday, Wednesday, and Friday - for PCP prophylaxsis

## 2020-10-14 NOTE — DISCHARGE NOTE PROVIDER - NSDCCAREPROVSEEN_GEN_ALL_CORE_FT
Diana Byrd (Nurse Practitioner)    Diana Byrd (Nurse Practitioner)   Dr. Valenzuela (Primary Care Provider, HIV specialist)  Dr. Palla (Cardiologist)

## 2020-10-14 NOTE — DISCHARGE NOTE PROVIDER - CARE PROVIDER_API CALL
Nicolas,   *** APPOINTMENT: TUESDAY OCTOBER 27, 2020 10 a.m. with Dr Valenzuela; [facility] [phone]  [address] [place] [address]  Phone: (   )    -  Fax: (   )    -  Follow Up Time:    Nicolas,   *** APPOINTMENT: TUESDAY OCTOBER 27, 2020 10 a.m. with Dr Valenzuela; [facility] [place]note 2[place] [phone]  [address] [place] [address]  Phone: (   )    -  Fax: (   )    -  Follow Up Time:     Palla, Venugopal R  CARDIOVASCULAR DISEASE  [address], [place] [id number]  Phone: [phone]  Fax: [phone]  Follow Up Time:

## 2020-10-14 NOTE — DISCHARGE NOTE PROVIDER - PROVIDER TOKENS
FREE:[LAST:[Nicolas],PHONE:[(   )    -],FAX:[(   )    -],ADDRESS:[*** APPOINTMENT: TUESDAY OCTOBER 27, 2020 10 a.m. with Dr Valenzuela; [facility] [phone]  [address] [place] [address]]] FREE:[LAST:[Nicolas],PHONE:[(   )    -],FAX:[(   )    -],ADDRESS:[*** APPOINTMENT: TUESDAY OCTOBER 27, 2020 10 a.m. with Dr Valenzuela; [facility] [phone]  [facility] [place] [address]]],PROVIDER:[TOKEN:[[id number]]]

## 2020-10-14 NOTE — DISCHARGE NOTE PROVIDER - CARE PROVIDERS DIRECT ADDRESSES
,DirectAddress_Unknown ,DirectAddress_Unknown,[email or web address]
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
## 2020-10-14 NOTE — DISCHARGE NOTE PROVIDER - NSDCCPCAREPLAN_GEN_ALL_CORE_FT
PRINCIPAL DISCHARGE DIAGNOSIS  Diagnosis: Pneumonia  Assessment and Plan of Treatment: - You were found to have pneumonia which is an infection in one or both of the lungs. It causes the air sacs of the lungs to fill up with fluid or pus. It can range from mild to severe, depending on the type of germ causing the infection, your age, and your overall health.   Continue to take Levaquin antibiotics for 3 more days (sent to pharmacy). Continue to stay hydrated.   **Monitor for the following signs/symptoms: Fever > 101, chills, cough with phlegm, shortness of breath and nausea and/or vomiting. If you experience these signs/symptoms please alert your primary care provider or if your signs/symptoms are severe please return to the ED**      SECONDARY DISCHARGE DIAGNOSES  Diagnosis: Hematemesis  Assessment and Plan of Treatment: - Your hematemsis has resolved while you were in the hospital. Continue to take Protonix 40mg once a day in the morning (sent to pharmacy) for GI prophylaxsis. Follow up with your primary care provider for further managment.    Diagnosis: HIV infection  Assessment and Plan of Treatment: - Your CD4 count = 14. Start antibiotic prophylaxsis -- Azithromycin 1200mg once a week on Wednesday (sent to pharmacy) and Bactrim DS every Monday, Wednesday and Friday (sent to pharmacy).  - Follow up with Dr. Valenzuela on TUESDAY OCTOBER 27, 2020 10 a.m. at [facility] [phone]  [address] [place] [address] for further management and HIV treatment.    Diagnosis: Hepatitis C  Assessment and Plan of Treatment: - Your Hepatitis C panel were mildly reactive. Follow up with your primary care provider for further management and follow up on your Hep C RNA count.    Diagnosis: Rib fractures  Assessment and Plan of Treatment: - Your imaging studies revealed 9th and 10th rib fractures  - Continue tylenol as needed for pain management    Diagnosis: Chest pain  Assessment and Plan of Treatment: - You were advised to undergo a stress test but you refused to complete the test. Continue to take aspirin 81mg once a day (sent to pharmacy) and atorvastatin 20mg once a day at bedtime (for dyslipidemia) (sent to pharmacy). Follow up with your cardiologist - Dr. Palla for an outpatient stress test and follow up echocardiogram.    Diagnosis: Nicotine dependence  Assessment and Plan of Treatment: - You were advised to quit smoking. Continue nictoine patch to assist with smoking cessation.     PRINCIPAL DISCHARGE DIAGNOSIS  Diagnosis: Pneumonia  Assessment and Plan of Treatment: - You were found to have pneumonia which is an infection in one or both of the lungs. It causes the air sacs of the lungs to fill up with fluid or pus. It can range from mild to severe, depending on the type of germ causing the infection, your age, and your overall health.   Continue to take Levaquin antibiotics for 3 more days (sent to pharmacy). Continue to stay hydrated.   **Monitor for the following signs/symptoms: Fever > 101, chills, cough with phlegm, shortness of breath and nausea and/or vomiting. If you experience these signs/symptoms please alert your primary care provider or if your signs/symptoms are severe please return to the ED**      SECONDARY DISCHARGE DIAGNOSES  Diagnosis: Hematemesis  Assessment and Plan of Treatment: - Your hematemsis has resolved while you were in the hospital. Continue to take Protonix 40mg once a day in the morning (sent to pharmacy) for GI prophylaxsis. Follow up with your primary care provider for further managment.    Diagnosis: Hepatitis C  Assessment and Plan of Treatment: - Your Hepatitis C panel were mildly reactive. Follow up with your primary care provider for further management and follow up on your Hep C RNA count which has not resulted yet** need close follow up for results**    Diagnosis: Rib fractures  Assessment and Plan of Treatment: - Your imaging studies revealed 9th and 10th rib fractures  - Continue tylenol as needed for pain management    Diagnosis: Chest pain  Assessment and Plan of Treatment: - You were advised to undergo a stress test but you refused to complete the test. Continue to take aspirin 81mg once a day (sent to pharmacy) and atorvastatin 20mg once a day at bedtime (for dyslipidemia) (sent to pharmacy). Follow up with your cardiologist - Dr. Palla for an outpatient stress test and follow up echocardiogram.    Diagnosis: Nicotine dependence  Assessment and Plan of Treatment: - You were advised to quit smoking. Continue nictoine patch to assist with smoking cessation.    Diagnosis: HIV infection  Assessment and Plan of Treatment: - Your absolute CD4 count = 144 and % is 14. Start antibiotic prophylaxsis -- Azithromycin 1200mg once a week on Wednesday (sent to pharmacy) and Bactrim DS every Monday, Wednesday and Friday (sent to pharmacy).  - Follow up with Dr. Valenzuela on TUESDAY OCTOBER 27, 2020 10 a.m. at [facility] [phone]  [address] [place] [address] for further management and HIV treatment.     PRINCIPAL DISCHARGE DIAGNOSIS  Diagnosis: Pneumonia  Assessment and Plan of Treatment: - You were found to have pneumonia which is an infection in one or both of the lungs. It causes the air sacs of the lungs to fill up with fluid or pus. It can range from mild to severe, depending on the type of germ causing the infection, your age, and your overall health.   Continue to take Levaquin antibiotics for 3 more days (sent to pharmacy). Continue to stay hydrated.   **Monitor for the following signs/symptoms: Fever > 101, chills, cough with phlegm, shortness of breath and nausea and/or vomiting. If you experience these signs/symptoms please alert your primary care provider or if your signs/symptoms are severe please return to the ED**      SECONDARY DISCHARGE DIAGNOSES  Diagnosis: Hematemesis  Assessment and Plan of Treatment: - Your hematemsis has resolved while you were in the hospital. Continue to take Protonix 40mg once a day in the morning (sent to pharmacy) for GI prophylaxsis. Follow up with your primary care provider for further managment.    Diagnosis: Hepatitis C  Assessment and Plan of Treatment: - Your Hepatitis C panel were mildly reactive. Follow up with your primary care provider for further management and follow up on your Hep C RNA count which has not resulted yet** need close follow up for results**    Diagnosis: Rib fractures  Assessment and Plan of Treatment: - Your imaging studies revealed 9th and 10th rib fractures  - Continue tylenol as needed for pain management    Diagnosis: Chest pain  Assessment and Plan of Treatment: - You were advised to undergo a stress test but you refused to complete the test. Continue to take aspirin 81mg once a day (sent to pharmacy) and atorvastatin 20mg once a day at bedtime (for dyslipidemia) (sent to pharmacy). Follow up with your cardiologist - Dr. Palla for an outpatient stress test and follow up echocardiogram.    Diagnosis: Nicotine dependence  Assessment and Plan of Treatment: - You were advised to quit smoking. Continue nictoine patch to assist with smoking cessation.    Diagnosis: HIV infection  Assessment and Plan of Treatment: - Your absolute CD4 count = 144 and % is 14. Start antibiotic prophylaxsis --  Bactrim DS every Monday, Wednesday and Friday (sent to pharmacy).  - Follow up with Dr. Valenzuela on TUESDAY OCTOBER 27, 2020 10 a.m. at [facility] [phone]  [address] [place], [place] [address], [place] [address] for further management and HIV treatment.     PRINCIPAL DISCHARGE DIAGNOSIS  Diagnosis: Pneumonia  Assessment and Plan of Treatment: - You were found to have pneumonia which is an infection in one or both of the lungs. It causes the air sacs of the lungs to fill up with fluid or pus. It can range from mild to severe, depending on the type of germ causing the infection, your age, and your overall health.   Continue to take Levaquin antibiotics for 3 more days (sent to pharmacy). Continue to stay hydrated.   **Monitor for the following signs/symptoms: Fever > 101, chills, cough with phlegm, shortness of breath and nausea and/or vomiting. If you experience these signs/symptoms please alert your primary care provider or if your signs/symptoms are severe please return to the ED**      SECONDARY DISCHARGE DIAGNOSES  Diagnosis: Hematemesis  Assessment and Plan of Treatment: - Your hematemsis has resolved while you were in the hospital. Continue to take Protonix 40mg once a day in the morning (sent to pharmacy) for GI prophylaxsis. Follow up with your primary care provider for further managment.    Diagnosis: Hepatitis C  Assessment and Plan of Treatment: - Your Hepatitis C panel were mildly reactive. Follow up with your primary care provider for further management and follow up on your Hep C RNA count which has not resulted yet** need close follow up for results**    Diagnosis: Rib fractures  Assessment and Plan of Treatment: - Your imaging studies revealed 9th and 10th rib fractures  - Continue tylenol as needed for pain management    Diagnosis: Chest pain  Assessment and Plan of Treatment: - You were advised to undergo a stress test but you refused to complete the test. Continue to take aspirin 81mg once a day (sent to pharmacy) and atorvastatin 20mg once a day at bedtime (for dyslipidemia) (sent to pharmacy). Follow up with your cardiologist - Dr. Palla for an outpatient stress test and follow up echocardiogram.    Diagnosis: Nicotine dependence  Assessment and Plan of Treatment: - You were advised to quit smoking. Continue nictoine patch to assist with smoking cessation.    Diagnosis: HIV infection  Assessment and Plan of Treatment: - Your absolute CD4 count = 144 and % is 14. Start antibiotic prophylaxsis --  Bactrim DS every Monday, Wednesday and Friday (sent to pharmacy).    MUST have repeat labs within 1-2 weeks.   - Follow up with Dr. Valenzuela on TUESDAY OCTOBER 27, 2020 10 a.m. at [facility] [phone]  [address] [place] [address] for further management and HIV treatment.

## 2020-10-15 ENCOUNTER — EMERGENCY (EMERGENCY)
Facility: HOSPITAL | Age: 30
LOS: 0 days | Discharge: HOME | End: 2020-10-15
Attending: EMERGENCY MEDICINE
Payer: MEDICAID

## 2020-10-15 VITALS
RESPIRATION RATE: 17 BRPM | WEIGHT: 160.06 LBS | HEIGHT: 71 IN | SYSTOLIC BLOOD PRESSURE: 123 MMHG | DIASTOLIC BLOOD PRESSURE: 69 MMHG | OXYGEN SATURATION: 100 % | TEMPERATURE: 99 F | HEART RATE: 83 BPM

## 2020-10-15 VITALS
RESPIRATION RATE: 18 BRPM | SYSTOLIC BLOOD PRESSURE: 119 MMHG | HEART RATE: 85 BPM | DIASTOLIC BLOOD PRESSURE: 73 MMHG | OXYGEN SATURATION: 100 %

## 2020-10-15 DIAGNOSIS — F17.200 NICOTINE DEPENDENCE, UNSPECIFIED, UNCOMPLICATED: ICD-10-CM

## 2020-10-15 DIAGNOSIS — R07.9 CHEST PAIN, UNSPECIFIED: ICD-10-CM

## 2020-10-15 DIAGNOSIS — Z59.0 HOMELESSNESS: ICD-10-CM

## 2020-10-15 DIAGNOSIS — R05 COUGH: ICD-10-CM

## 2020-10-15 DIAGNOSIS — Z86.73 PERSONAL HISTORY OF TRANSIENT ISCHEMIC ATTACK (TIA), AND CEREBRAL INFARCTION WITHOUT RESIDUAL DEFICITS: ICD-10-CM

## 2020-10-15 DIAGNOSIS — Z98.890 OTHER SPECIFIED POSTPROCEDURAL STATES: Chronic | ICD-10-CM

## 2020-10-15 DIAGNOSIS — Z88.1 ALLERGY STATUS TO OTHER ANTIBIOTIC AGENTS STATUS: ICD-10-CM

## 2020-10-15 DIAGNOSIS — J45.909 UNSPECIFIED ASTHMA, UNCOMPLICATED: ICD-10-CM

## 2020-10-15 DIAGNOSIS — Z21 ASYMPTOMATIC HUMAN IMMUNODEFICIENCY VIRUS [HIV] INFECTION STATUS: ICD-10-CM

## 2020-10-15 DIAGNOSIS — Z79.82 LONG TERM (CURRENT) USE OF ASPIRIN: ICD-10-CM

## 2020-10-15 PROCEDURE — 99284 EMERGENCY DEPT VISIT MOD MDM: CPT

## 2020-10-15 PROCEDURE — 71045 X-RAY EXAM CHEST 1 VIEW: CPT | Mod: 26

## 2020-10-15 PROCEDURE — 93005 ELECTROCARDIOGRAM TRACING: CPT

## 2020-10-15 PROCEDURE — 99283 EMERGENCY DEPT VISIT LOW MDM: CPT | Mod: 25

## 2020-10-15 PROCEDURE — 93010 ELECTROCARDIOGRAM REPORT: CPT

## 2020-10-15 PROCEDURE — 71045 X-RAY EXAM CHEST 1 VIEW: CPT

## 2020-10-15 RX ORDER — IBUPROFEN 200 MG
400 TABLET ORAL ONCE
Refills: 0 | Status: COMPLETED | OUTPATIENT
Start: 2020-10-15 | End: 2020-10-15

## 2020-10-15 RX ADMIN — Medication 400 MILLIGRAM(S): at 18:42

## 2020-10-15 SDOH — ECONOMIC STABILITY - HOUSING INSECURITY: HOMELESSNESS: Z59.0

## 2020-10-15 NOTE — ED ADULT TRIAGE NOTE - CHIEF COMPLAINT QUOTE
PT presents [facility] ed [facility]- pt reports chest pain starting 1 hour prior to arrival - worse while coughing. 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 pt dced from . pt in no distress, vitals stable PT presents to ed via ems from [facility]- pt reports chest pain starting 1 hour prior to arrival - worse while coughing. 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 pt dced from . pt in no distress, vitals stable. ekg in progress

## 2020-10-15 NOTE — ED PROVIDER NOTE - OBJECTIVE STATEMENT
31 y/o M with PMHx of HIV, now non compliant for over one month after being on Genvoya, GBS, asthma, seizure, CVA, marijuana/tobacco/cocaine use, and recently admitted to  10/11-10/14 for chest pain, hematemesis [facility]/o +L sided chest pain x1 hour radiating to back. Pain worsens with +coughing. No fever. Denies fall, trauma, or cocaine use today. During recent admission pt had full cardiac workup which was unremarkable and was dx with PNA and d/c yesterday with 3 more days of Levaquin. Allergic to Ceclor.

## 2020-10-15 NOTE — ED PROVIDER NOTE - PMH
Asthma    Chronic sinusitis    Closed fracture of multiple ribs of right side, initial encounter    Cocaine abuse    CVA (cerebral vascular accident)    Guillain-[place]    HIV (human immunodeficiency virus infection)  from birth  Homeless

## 2020-10-15 NOTE — ED PROVIDER NOTE - PATIENT PORTAL LINK FT
You can access the FollowMyHealth Patient Portal offered by [facility] by registering at the following website: [email or web address]. By joining [facility]’s FollowMyHealth portal, you will also be able to view your health information using other applications (apps) compatible with our system.

## 2020-10-15 NOTE — ED PROVIDER NOTE - CCCP TRG CHIEF CMPLNT
Spoke to pt to advice of order below   Pt was advice he is unable to come to procedure with a taxi  Pt will find a  and call back   Message confirmed with caller.  Electronically Signed by:    EWELINA Bolden , 02/28/19       chest pain

## 2020-10-15 NOTE — ED PROVIDER NOTE - NSFOLLOWUPINSTRUCTIONS_ED_ALL_ED_FT
Chest Pain    Chest pain can be caused by many different conditions which may or may not be dangerous. Causes include heartburn, lung infections, heart attack, blood clot in lungs, skin infections, strain or damage to muscle, cartilage, or bones, etc. In addition to a history and physical examination, an electrocardiogram (ECG) or other lab tests may have been performed to determine the cause of your chest pain. Follow up with your primary care provider or with a cardiologist as instructed.     SEEK IMMEDIATE MEDICAL CARE IF YOU HAVE ANY OF THE FOLLOWING SYMPTOMS: worsening chest pain, coughing up blood, unexplained back/neck/jaw pain, severe abdominal pain, dizziness or lightheadedness, fainting, shortness of breath, sweaty or clammy skin, vomiting, or racing heart beat. These symptoms may represent a serious problem that is an emergency. Do not wait to see if the symptoms will go away. Get medical help right away. Call 911 and do not drive yourself to the hospital. Ibuprofen as needed for pain  Follow-up with your regular physician  Return with any persistent/worsening symptoms.     Chest Pain    Chest pain can be caused by many different conditions which may or may not be dangerous. Causes include heartburn, lung infections, heart attack, blood clot in lungs, skin infections, strain or damage to muscle, cartilage, or bones, etc. In addition to a history and physical examination, an electrocardiogram (ECG) or other lab tests may have been performed to determine the cause of your chest pain. Follow up with your primary care provider or with a cardiologist as instructed.     SEEK IMMEDIATE MEDICAL CARE IF YOU HAVE ANY OF THE FOLLOWING SYMPTOMS: worsening chest pain, coughing up blood, unexplained back/neck/jaw pain, severe abdominal pain, dizziness or lightheadedness, fainting, shortness of breath, sweaty or clammy skin, vomiting, or racing heart beat. These symptoms may represent a serious problem that is an emergency. Do not wait to see if the symptoms will go away. Get medical help right away. Call 911 and do not drive yourself to the hospital.

## 2020-10-15 NOTE — ED ADULT NURSE NOTE - OBJECTIVE STATEMENT
Patient states that 1530 today the patient started to have chest pain [place] on the train.  Patient states he called for an ambulance from 7 -11 at around 1630 and waited for the ambulance for 15 minutes but the ambulance didn't come so he left.  Patient went to [facility] and had ambulance called from the [facility].  Patient has a medical history of HIV.  Patient is not currently taking medications.  Patient seen here for rectal bleeding but left hospital.  Patient is feeling weak.

## 2020-10-15 NOTE — ED ADULT NURSE NOTE - CHIEF COMPLAINT QUOTE
PT presents to ed via ems from [facility]- pt reports chest pain starting 1 hour prior to arrival - worse while coughing. 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 pt dced from . pt in no distress, vitals stable. ekg in progress

## 2020-10-15 NOTE — ED ADULT NURSE NOTE - NSIMPLEMENTINTERV_GEN_ALL_ED
Implemented All Fall Risk Interventions:  [place] to call system. Call bell, personal items and telephone within reach. Instruct patient to call for assistance. Room bathroom lighting operational. Non-slip footwear when patient is off stretcher. Physically safe environment: no spills, clutter or unnecessary equipment. Stretcher in lowest position, wheels locked, appropriate side rails in place. Provide visual cue, wrist band, yellow gown, etc. Monitor gait and stability. Monitor for mental status changes and reorient to person, place, and time. Review medications for side effects contributing to fall risk. Reinforce activity limits and safety measures with patient and family.

## 2020-10-16 DIAGNOSIS — J15.6 PNEUMONIA DUE TO OTHER GRAM-NEGATIVE BACTERIA: ICD-10-CM

## 2020-10-16 DIAGNOSIS — S22.41XA MULTIPLE FRACTURES OF RIBS, RIGHT SIDE, INITIAL ENCOUNTER FOR CLOSED FRACTURE: ICD-10-CM

## 2020-10-16 DIAGNOSIS — S27.329A CONTUSION OF LUNG, UNSPECIFIED, INITIAL ENCOUNTER: ICD-10-CM

## 2020-10-16 DIAGNOSIS — G61.0 GUILLAIN-BARRE SYNDROME: ICD-10-CM

## 2020-10-16 DIAGNOSIS — K92.2 GASTROINTESTINAL HEMORRHAGE, UNSPECIFIED: ICD-10-CM
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
## 2020-10-16 DIAGNOSIS — Z86.73 PERSONAL HISTORY OF TRANSIENT ISCHEMIC ATTACK (TIA), AND CEREBRAL INFARCTION WITHOUT RESIDUAL DEFICITS: ICD-10-CM

## 2020-10-16 DIAGNOSIS — R04.2 HEMOPTYSIS: ICD-10-CM

## 2020-10-16 DIAGNOSIS — Z21 ASYMPTOMATIC HUMAN IMMUNODEFICIENCY VIRUS [HIV] INFECTION STATUS: ICD-10-CM

## 2020-10-16 DIAGNOSIS — Z59.0 HOMELESSNESS: ICD-10-CM

## 2020-10-16 DIAGNOSIS — F14.90 COCAINE USE, UNSPECIFIED, UNCOMPLICATED: ICD-10-CM
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
## 2020-10-16 DIAGNOSIS — F17.200 NICOTINE DEPENDENCE, UNSPECIFIED, UNCOMPLICATED: ICD-10-CM
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
## 2020-10-16 DIAGNOSIS — J90 PLEURAL EFFUSION, NOT ELSEWHERE CLASSIFIED: ICD-10-CM

## 2020-10-16 DIAGNOSIS — J45.909 UNSPECIFIED ASTHMA, UNCOMPLICATED: ICD-10-CM

## 2020-10-16 DIAGNOSIS — F12.90 CANNABIS USE, UNSPECIFIED, UNCOMPLICATED: ICD-10-CM

## 2020-10-16 DIAGNOSIS — B18.9 CHRONIC VIRAL HEPATITIS, UNSPECIFIED: ICD-10-CM

## 2020-10-16 DIAGNOSIS — Z91.14 PATIENT'S OTHER NONCOMPLIANCE WITH MEDICATION REGIMEN: ICD-10-CM

## 2020-10-16 PROBLEM — F14.10 COCAINE ABUSE, UNCOMPLICATED: Chronic | Status: ACTIVE | Noted: 2020-10-11

## 2020-10-16 PROBLEM — J32.9 CHRONIC SINUSITIS, UNSPECIFIED: Chronic | Status: ACTIVE | Noted: 2020-10-11

## 2020-10-16 SDOH — ECONOMIC STABILITY - HOUSING INSECURITY: HOMELESSNESS: Z59.0

## 2020-10-19 ENCOUNTER — INPATIENT (INPATIENT)
Facility: HOSPITAL | Age: 30
LOS: 0 days | Discharge: LEFT BEFORE DISCHARGE | End: 2020-10-20
Attending: INTERNAL MEDICINE | Admitting: INTERNAL MEDICINE
Payer: MEDICAID

## 2020-10-19 VITALS
OXYGEN SATURATION: 98 % | TEMPERATURE: 98 F | RESPIRATION RATE: 16 BRPM | DIASTOLIC BLOOD PRESSURE: 70 MMHG | HEIGHT: 71 IN | SYSTOLIC BLOOD PRESSURE: 121 MMHG | WEIGHT: 169.98 LBS | HEART RATE: 75 BPM

## 2020-10-19 DIAGNOSIS — Z98.890 OTHER SPECIFIED POSTPROCEDURAL STATES: Chronic | ICD-10-CM

## 2020-10-19 LAB
ALBUMIN SERPL ELPH-MCNC: 3.5 G/DL — SIGNIFICANT CHANGE UP (ref 3.3–5)
ALP SERPL-CCNC: 83 U/L — SIGNIFICANT CHANGE UP (ref 40–120)
ALT FLD-CCNC: 34 U/L — SIGNIFICANT CHANGE UP (ref 12–78)
ANION GAP SERPL CALC-SCNC: 6 MMOL/L — SIGNIFICANT CHANGE UP (ref 5–17)
APTT BLD: 29.1 SEC — SIGNIFICANT CHANGE UP (ref 27.5–35.5)
AST SERPL-CCNC: 49 U/L — HIGH (ref 15–37)
BASOPHILS # BLD AUTO: 0.02 K/UL — SIGNIFICANT CHANGE UP (ref 0–0.2)
BASOPHILS NFR BLD AUTO: 0.9 % — SIGNIFICANT CHANGE UP (ref 0–2)
BILIRUB SERPL-MCNC: 0.4 MG/DL — SIGNIFICANT CHANGE UP (ref 0.2–1.2)
BUN SERPL-MCNC: 18 MG/DL — SIGNIFICANT CHANGE UP (ref 7–23)
CALCIUM SERPL-MCNC: 8.3 MG/DL — LOW (ref 8.5–10.1)
CHLORIDE SERPL-SCNC: 105 MMOL/L — SIGNIFICANT CHANGE UP (ref 96–108)
CO2 SERPL-SCNC: 28 MMOL/L — SIGNIFICANT CHANGE UP (ref 22–31)
CREAT SERPL-MCNC: 0.8 MG/DL — SIGNIFICANT CHANGE UP (ref 0.5–1.3)
D DIMER BLD IA.RAPID-MCNC: 164 NG/ML DDU — SIGNIFICANT CHANGE UP
EOSINOPHIL # BLD AUTO: 0.06 K/UL — SIGNIFICANT CHANGE UP (ref 0–0.5)
EOSINOPHIL NFR BLD AUTO: 2.6 % — SIGNIFICANT CHANGE UP (ref 0–6)
GLUCOSE SERPL-MCNC: 92 MG/DL — SIGNIFICANT CHANGE UP (ref 70–99)
HCT VFR BLD CALC: 35.1 % — LOW (ref 39–50)
HGB BLD-MCNC: 11.4 G/DL — LOW (ref 13–17)
IMM GRANULOCYTES NFR BLD AUTO: 0 % — SIGNIFICANT CHANGE UP (ref 0–1.5)
INR BLD: 1.11 RATIO — SIGNIFICANT CHANGE UP (ref 0.88–1.16)
LACTATE SERPL-SCNC: 1.5 MMOL/L — SIGNIFICANT CHANGE UP (ref 0.7–2)
LYMPHOCYTES # BLD AUTO: 1.13 K/UL — SIGNIFICANT CHANGE UP (ref 1–3.3)
LYMPHOCYTES # BLD AUTO: 48.3 % — HIGH (ref 13–44)
MCHC RBC-ENTMCNC: 29.5 PG — SIGNIFICANT CHANGE UP (ref 27–34)
MCHC RBC-ENTMCNC: 32.5 GM/DL — SIGNIFICANT CHANGE UP (ref 32–36)
MCV RBC AUTO: 90.9 FL — SIGNIFICANT CHANGE UP (ref 80–100)
MONOCYTES # BLD AUTO: 0.39 K/UL — SIGNIFICANT CHANGE UP (ref 0–0.9)
MONOCYTES NFR BLD AUTO: 16.7 % — HIGH (ref 2–14)
NEUTROPHILS # BLD AUTO: 0.74 K/UL — LOW (ref 1.8–7.4)
NEUTROPHILS NFR BLD AUTO: 31.5 % — LOW (ref 43–77)
NRBC # BLD: 0 /100 WBCS — SIGNIFICANT CHANGE UP (ref 0–0)
OB PNL STL: NEGATIVE — SIGNIFICANT CHANGE UP
PLATELET # BLD AUTO: 214 K/UL — SIGNIFICANT CHANGE UP (ref 150–400)
POTASSIUM SERPL-MCNC: 3.8 MMOL/L — SIGNIFICANT CHANGE UP (ref 3.5–5.3)
POTASSIUM SERPL-SCNC: 3.8 MMOL/L — SIGNIFICANT CHANGE UP (ref 3.5–5.3)
PROT SERPL-MCNC: 7.7 GM/DL — SIGNIFICANT CHANGE UP (ref 6–8.3)
PROTHROM AB SERPL-ACNC: 12.8 SEC — SIGNIFICANT CHANGE UP (ref 10.6–13.6)
RBC # BLD: 3.86 M/UL — LOW (ref 4.2–5.8)
RBC # FLD: 13 % — SIGNIFICANT CHANGE UP (ref 10.3–14.5)
SODIUM SERPL-SCNC: 139 MMOL/L — SIGNIFICANT CHANGE UP (ref 135–145)
TROPONIN I SERPL-MCNC: <.015 NG/ML — SIGNIFICANT CHANGE UP (ref 0.01–0.04)
WBC # BLD: 2.34 K/UL — LOW (ref 3.8–10.5)
WBC # FLD AUTO: 2.34 K/UL — LOW (ref 3.8–10.5)

## 2020-10-19 PROCEDURE — 74177 CT ABD & PELVIS W/CONTRAST: CPT | Mod: 26,MH

## 2020-10-19 PROCEDURE — 71045 X-RAY EXAM CHEST 1 VIEW: CPT | Mod: 26

## 2020-10-19 PROCEDURE — 93010 ELECTROCARDIOGRAM REPORT: CPT

## 2020-10-19 PROCEDURE — 71260 CT THORAX DX C+: CPT | Mod: 26,MH

## 2020-10-19 PROCEDURE — 99285 EMERGENCY DEPT VISIT HI MDM: CPT

## 2020-10-19 PROCEDURE — 70450 CT HEAD/BRAIN W/O DYE: CPT | Mod: 26,MH

## 2020-10-19 RX ORDER — PANTOPRAZOLE SODIUM 20 MG/1
40 TABLET, DELAYED RELEASE ORAL ONCE
Refills: 0 | Status: COMPLETED | OUTPATIENT
Start: 2020-10-19 | End: 2020-10-19

## 2020-10-19 RX ORDER — SODIUM CHLORIDE 9 MG/ML
2000 INJECTION INTRAMUSCULAR; INTRAVENOUS; SUBCUTANEOUS ONCE
Refills: 0 | Status: COMPLETED | OUTPATIENT
Start: 2020-10-19 | End: 2020-10-19

## 2020-10-19 RX ADMIN — SODIUM CHLORIDE 2000 MILLILITER(S): 9 INJECTION INTRAMUSCULAR; INTRAVENOUS; SUBCUTANEOUS at 19:38

## 2020-10-19 RX ADMIN — PANTOPRAZOLE SODIUM 40 MILLIGRAM(S): 20 TABLET, DELAYED RELEASE ORAL at 22:29

## 2020-10-19 NOTE — ED PROVIDER NOTE - PMH
Asthma    Chronic sinusitis    Closed fracture of multiple ribs of right side, initial encounter    Cocaine abuse    CVA (cerebral vascular accident)    Guillain-[place]    HIV (human immunodeficiency virus infection)  from birth  Homeless

## 2020-10-19 NOTE — ED ADULT NURSE NOTE - PMH
Asthma    Chronic sinusitis    Closed fracture of multiple ribs of right side, initial encounter    Cocaine abuse    CVA (cerebral vascular accident)    Guillain-[place]    HIV (human immunodeficiency virus infection)  from birth  Homeless

## 2020-10-19 NOTE — ED PROVIDER NOTE - OBJECTIVE STATEMENT
29yo with pmh HIV (10/13: CD4 144), noncompliant x 1 month, h/o guillain barre, asthma, ?seizure/CVA, h/o marijuana/tobacco/cocaine, recent admission for 3 days for CP and hemetemasis, had full cardiac work up and dc on abx for PNA. presents with 1 week of cough with productive sputum, 1 week of frontal headache/photophobia, and today with vomiting and diarrhea and noted dark blood x 3. pt also with left sided cp for a while.  No fever, significant sob, abd pain, dysuria.     No fever/chills, No photophobia/eye pain/changes in vision, No ear pain/sore throat/dysphagia, +chest pain, no palpitations, +SOB/cough, no wheeze/stridor, No abdominal pain, + N/V/D, no dysuria/frequency/discharge, No neck/back pain, no rash, no changes in neurological status/function. 29yo with pmh HIV (10/13: CD4 144), noncompliant x 1 month, h/o guillain barre, asthma, ?seizure/CVA, h/o marijuana/tobacco/cocaine, recent admission for 3 days for CP and hemetemasis, had full cardiac work up and dc on abx for PNA. presents with 1 week of cough with productive sputum, 1 week of frontal headache/photophobia, and today with vomiting and diarrhea and noted dark blood x 3. pt also with left sided cp for a while.  No fever, significant sob, abd pain, dysuria.  Per chart, pt noted with prior rib fx, likely cause of chronic cp.     No fever/chills, No photophobia/eye pain/changes in vision, No ear pain/sore throat/dysphagia, +chest pain, no palpitations, +SOB/cough, no wheeze/stridor, No abdominal pain, + N/V/D, no dysuria/frequency/discharge, No neck/back pain, no rash, no changes in neurological status/function.

## 2020-10-19 NOTE — ED PROVIDER NOTE - CLINICAL SUMMARY MEDICAL DECISION MAKING FREE TEXT BOX
Pt with reported bloody diarrhea, on my guiac it was maroon stool. will admit to [facility], blood cultures given pt is immunocompromised. pt looks comfortable, but pt does have headache, may consider ? LP if still persistent. pt chronic cp likely from rib fx.

## 2020-10-19 NOTE — ED ADULT NURSE REASSESSMENT NOTE - NS ED NURSE REASSESS COMMENT FT1
received report from ANDREA Thakkar. pt on the bed eyes closed. pt identified self introduced. Aox3 pt no complain at this time pending UA. safety measures checked.

## 2020-10-19 NOTE — ED PROVIDER NOTE - PHYSICAL EXAMINATION
Gen: Alert, Well appearing. NAD    Head: NC, AT, PERRL, normal lids/conjunctiva   ENT: patent oropharynx without erythema/exudate, uvula midline  Neck: supple, no tenderness/meningismus  Pulm: Bilateral clear BS, normal resp effort  CV: RRR, no M/R/G, +dist pulses   Abd: soft, NT/ND, +BS, no guarding/rebound tenderness  Mskel: no edema/erythema/cyanosis   Skin: no rash, no bruising  Neuro: AAOx3, no sensory/motor deficits, CN 2-12 intact Gen: Alert, Well appearing. NAD    Head: NC, AT, PERRL, normal lids/conjunctiva   ENT: patent oropharynx without erythema/exudate, uvula midline  Neck: supple, no tenderness/meningismus  Pulm: Bilateral clear BS, normal resp effort  CV: RRR, no M/R/G, +dist pulses   Abd: soft, NT/ND, +BS, no guarding/rebound tenderness  rectal: no hemorrhoids, maroon stool  Mskel: no edema/erythema/cyanosis   Skin: no rash, no bruising  Neuro: AAOx3, no sensory/motor deficits, CN 2-12 intact

## 2020-10-19 NOTE — ED ADULT NURSE NOTE - OBJECTIVE STATEMENT
A&Ox4, ambulating. pt c/o vomiting blood since AM x3 with headache, pain 10/10, pt denies sob/dizziness/fevers/chills.

## 2020-10-19 NOTE — ED ADULT NURSE NOTE - NSIMPLEMENTINTERV_GEN_ALL_ED
Implemented All Universal Safety Interventions:  [place] to call system. Call bell, personal items and telephone within reach. Instruct patient to call for assistance. Room bathroom lighting operational. Non-slip footwear when patient is off stretcher. Physically safe environment: no spills, clutter or unnecessary equipment. Stretcher in lowest position, wheels locked, appropriate side rails in place.

## 2020-10-20 ENCOUNTER — TRANSCRIPTION ENCOUNTER (OUTPATIENT)
Age: 30
End: 2020-10-20

## 2020-10-20 VITALS
RESPIRATION RATE: 18 BRPM | TEMPERATURE: 98 F | OXYGEN SATURATION: 100 % | HEIGHT: 71 IN | WEIGHT: 182.98 LBS | SYSTOLIC BLOOD PRESSURE: 131 MMHG | DIASTOLIC BLOOD PRESSURE: 76 MMHG | HEART RATE: 72 BPM

## 2020-10-20 DIAGNOSIS — J32.9 CHRONIC SINUSITIS, UNSPECIFIED: ICD-10-CM

## 2020-10-20 DIAGNOSIS — R19.7 DIARRHEA, UNSPECIFIED: ICD-10-CM

## 2020-10-20 DIAGNOSIS — B20 HUMAN IMMUNODEFICIENCY VIRUS [HIV] DISEASE: ICD-10-CM

## 2020-10-20 DIAGNOSIS — J18.9 PNEUMONIA, UNSPECIFIED ORGANISM: ICD-10-CM

## 2020-10-20 DIAGNOSIS — Z29.9 ENCOUNTER FOR PROPHYLACTIC MEASURES, UNSPECIFIED: ICD-10-CM

## 2020-10-20 LAB
ALBUMIN SERPL ELPH-MCNC: 3.1 G/DL — LOW (ref 3.3–5)
ALP SERPL-CCNC: 75 U/L — SIGNIFICANT CHANGE UP (ref 40–120)
ALT FLD-CCNC: 30 U/L — SIGNIFICANT CHANGE UP (ref 12–78)
AMPHET UR-MCNC: NEGATIVE — SIGNIFICANT CHANGE UP
ANION GAP SERPL CALC-SCNC: 5 MMOL/L — SIGNIFICANT CHANGE UP (ref 5–17)
APPEARANCE UR: CLEAR — SIGNIFICANT CHANGE UP
AST SERPL-CCNC: 30 U/L — SIGNIFICANT CHANGE UP (ref 15–37)
BARBITURATES UR SCN-MCNC: NEGATIVE — SIGNIFICANT CHANGE UP
BASOPHILS # BLD AUTO: 0.01 K/UL — SIGNIFICANT CHANGE UP (ref 0–0.2)
BASOPHILS NFR BLD AUTO: 0.5 % — SIGNIFICANT CHANGE UP (ref 0–2)
BENZODIAZ UR-MCNC: NEGATIVE — SIGNIFICANT CHANGE UP
BILIRUB SERPL-MCNC: 0.4 MG/DL — SIGNIFICANT CHANGE UP (ref 0.2–1.2)
BILIRUB UR-MCNC: NEGATIVE — SIGNIFICANT CHANGE UP
BUN SERPL-MCNC: 15 MG/DL — SIGNIFICANT CHANGE UP (ref 7–23)
CALCIUM SERPL-MCNC: 7.8 MG/DL — LOW (ref 8.5–10.1)
CHLORIDE SERPL-SCNC: 113 MMOL/L — HIGH (ref 96–108)
CO2 SERPL-SCNC: 26 MMOL/L — SIGNIFICANT CHANGE UP (ref 22–31)
COCAINE METAB.OTHER UR-MCNC: NEGATIVE — SIGNIFICANT CHANGE UP
COLOR SPEC: YELLOW — SIGNIFICANT CHANGE UP
CREAT SERPL-MCNC: 0.8 MG/DL — SIGNIFICANT CHANGE UP (ref 0.5–1.3)
DIFF PNL FLD: NEGATIVE — SIGNIFICANT CHANGE UP
EOSINOPHIL # BLD AUTO: 0.03 K/UL — SIGNIFICANT CHANGE UP (ref 0–0.5)
EOSINOPHIL NFR BLD AUTO: 1.4 % — SIGNIFICANT CHANGE UP (ref 0–6)
GLUCOSE SERPL-MCNC: 81 MG/DL — SIGNIFICANT CHANGE UP (ref 70–99)
GLUCOSE UR QL: NEGATIVE MG/DL — SIGNIFICANT CHANGE UP
HCT VFR BLD CALC: 35.8 % — LOW (ref 39–50)
HGB BLD-MCNC: 11.1 G/DL — LOW (ref 13–17)
IMM GRANULOCYTES NFR BLD AUTO: 0.5 % — SIGNIFICANT CHANGE UP (ref 0–1.5)
KETONES UR-MCNC: NEGATIVE — SIGNIFICANT CHANGE UP
LEUKOCYTE ESTERASE UR-ACNC: NEGATIVE — SIGNIFICANT CHANGE UP
LYMPHOCYTES # BLD AUTO: 1.18 K/UL — SIGNIFICANT CHANGE UP (ref 1–3.3)
LYMPHOCYTES # BLD AUTO: 55.9 % — HIGH (ref 13–44)
MAGNESIUM SERPL-MCNC: 1.6 MG/DL — SIGNIFICANT CHANGE UP (ref 1.6–2.6)
MCHC RBC-ENTMCNC: 28.2 PG — SIGNIFICANT CHANGE UP (ref 27–34)
MCHC RBC-ENTMCNC: 31 GM/DL — LOW (ref 32–36)
MCV RBC AUTO: 91.1 FL — SIGNIFICANT CHANGE UP (ref 80–100)
METHADONE UR-MCNC: NEGATIVE — SIGNIFICANT CHANGE UP
MONOCYTES # BLD AUTO: 0.41 K/UL — SIGNIFICANT CHANGE UP (ref 0–0.9)
MONOCYTES NFR BLD AUTO: 19.4 % — HIGH (ref 2–14)
NEUTROPHILS # BLD AUTO: 0.47 K/UL — LOW (ref 1.8–7.4)
NEUTROPHILS NFR BLD AUTO: 22.3 % — LOW (ref 43–77)
NITRITE UR-MCNC: NEGATIVE — SIGNIFICANT CHANGE UP
NRBC # BLD: 0 /100 WBCS — SIGNIFICANT CHANGE UP (ref 0–0)
OPIATES UR-MCNC: NEGATIVE — SIGNIFICANT CHANGE UP
PCP SPEC-MCNC: SIGNIFICANT CHANGE UP
PCP UR-MCNC: NEGATIVE — SIGNIFICANT CHANGE UP
PH UR: 8 — SIGNIFICANT CHANGE UP (ref 5–8)
PLATELET # BLD AUTO: 187 K/UL — SIGNIFICANT CHANGE UP (ref 150–400)
POTASSIUM SERPL-MCNC: 3.6 MMOL/L — SIGNIFICANT CHANGE UP (ref 3.5–5.3)
POTASSIUM SERPL-SCNC: 3.6 MMOL/L — SIGNIFICANT CHANGE UP (ref 3.5–5.3)
PROT SERPL-MCNC: 6.8 GM/DL — SIGNIFICANT CHANGE UP (ref 6–8.3)
PROT UR-MCNC: NEGATIVE MG/DL — SIGNIFICANT CHANGE UP
RBC # BLD: 3.93 M/UL — LOW (ref 4.2–5.8)
RBC # FLD: 12.8 % — SIGNIFICANT CHANGE UP (ref 10.3–14.5)
SARS-COV-2 RNA SPEC QL NAA+PROBE: SIGNIFICANT CHANGE UP
SODIUM SERPL-SCNC: 144 MMOL/L — SIGNIFICANT CHANGE UP (ref 135–145)
SP GR SPEC: 1.01 — SIGNIFICANT CHANGE UP (ref 1.01–1.02)
THC UR QL: POSITIVE — SIGNIFICANT CHANGE UP
UROBILINOGEN FLD QL: NEGATIVE MG/DL — SIGNIFICANT CHANGE UP
WBC # BLD: 2.11 K/UL — LOW (ref 3.8–10.5)
WBC # FLD AUTO: 2.11 K/UL — LOW (ref 3.8–10.5)

## 2020-10-20 PROCEDURE — 99238 HOSP IP/OBS DSCHRG MGMT 30/<: CPT

## 2020-10-20 PROCEDURE — 99222 1ST HOSP IP/OBS MODERATE 55: CPT

## 2020-10-20 RX ORDER — ONDANSETRON 8 MG/1
4 TABLET, FILM COATED ORAL EVERY 6 HOURS
Refills: 0 | Status: DISCONTINUED | OUTPATIENT
Start: 2020-10-20 | End: 2020-10-20

## 2020-10-20 RX ORDER — SODIUM CHLORIDE 9 MG/ML
1000 INJECTION INTRAMUSCULAR; INTRAVENOUS; SUBCUTANEOUS
Refills: 0 | Status: DISCONTINUED | OUTPATIENT
Start: 2020-10-20 | End: 2020-10-20

## 2020-10-20 RX ORDER — IPRATROPIUM/ALBUTEROL SULFATE 18-103MCG
3 AEROSOL WITH ADAPTER (GRAM) INHALATION EVERY 6 HOURS
Refills: 0 | Status: DISCONTINUED | OUTPATIENT
Start: 2020-10-20 | End: 2020-10-20

## 2020-10-20 RX ORDER — ATORVASTATIN CALCIUM 80 MG/1
20 TABLET, FILM COATED ORAL AT BEDTIME
Refills: 0 | Status: DISCONTINUED | OUTPATIENT
Start: 2020-10-20 | End: 2020-10-20

## 2020-10-20 RX ORDER — PANTOPRAZOLE SODIUM 20 MG/1
40 TABLET, DELAYED RELEASE ORAL
Refills: 0 | Status: DISCONTINUED | OUTPATIENT
Start: 2020-10-20 | End: 2020-10-20

## 2020-10-20 RX ADMIN — PANTOPRAZOLE SODIUM 40 MILLIGRAM(S): 20 TABLET, DELAYED RELEASE ORAL at 07:54

## 2020-10-20 RX ADMIN — Medication 1 TABLET(S): at 07:53

## 2020-10-20 NOTE — H&P ADULT - HISTORY OF PRESENT ILLNESS
31 y/o male w/ hx of HIV recently admitted a few days 31 y/o with PMHx of HIV (last CD4 144 from 10/13), GBS x 2, asthma, ?seizure/CVA, marijuana/tobacco/cocaine abuse, recent admission for 3 days for CP and hemetemasis, pt signed out AMA presents with 1 week of cough with productive sputum, 1 week of frontal headache/photophobia, and today with vomiting and diarrhea and noted dark red blood x 3. Pt also reports diffuse abdominal discomfort and distension. Pt denies fever, chills, SOB, n/v.     	 29 y/o with PMHx of HIV (last CD4 144 from 10/13), GBS x 2, asthma, Hepatitis C, ?seizure/CVA, marijuana/tobacco/cocaine abuse, known hx of noncompliance, recent admission for 3 days for cp, PNA and per GI note hemoptysis at [place] after presenting to [facility] prior and signing out AMA, presents with 1 week of cough with productive sputum, cp, as well as HA and today developed vomiting and diarrhea. Pt reports dark red bloody stools x 3 episodes. Pt also reports diffuse abdominal discomfort and distension. Pt denies fever, chills, SOB, n/v. Per last admx pt was discharged on Levaquin for 3 more days for atypical PNA and Bactrim DS for PJP ppx however when asked pt reports he was not discharged on anything.     In ED Vitals stable, pt afebrile. For sig labs see below. CT A/P done     	 31 y/o with PMHx of HIV (last CD4 144 from 10/13), GBS x 2, asthma, Hepatitis C, ?seizure/CVA, marijuana/tobacco/cocaine abuse, known hx of noncompliance, recent admission for 3 days for cp, PNA, hemetemesis however per GI note hemoptysis at [place] after presenting to [facility] prior and signing out AMA, presents with 1 week of cough with productive sputum, cp, as well as HA and today developed vomiting and diarrhea. Pt reports dark red bloody stools x 3 episodes. Pt also reports diffuse abdominal discomfort and distension. Pt denies fever, chills, SOB, n/v. Per last admx pt was discharged on Levaquin for 3 more days for atypical PNA and Bactrim DS for PJP ppx however when asked pt reports he was not discharged on anything.     In ED Vitals stable, pt afebrile. For sig labs see below. CT chest, head, A/P done, no acute GI pathology, possible PE however dimer neg.

## 2020-10-20 NOTE — DISCHARGE NOTE PROVIDER - NSDCCPCAREPLAN_GEN_ALL_CORE_FT
PRINCIPAL DISCHARGE DIAGNOSIS  Diagnosis: Bloody diarrhea  Assessment and Plan of Treatment:       SECONDARY DISCHARGE DIAGNOSES  Diagnosis: Headache  Assessment and Plan of Treatment:

## 2020-10-20 NOTE — H&P ADULT - PROBLEM SELECTOR PLAN 2
- last CD4 144 - CT w/ unchanged possible infectious bronchiolitis  - pt never completed abx therapy; restart Levaquin  - nebs prn cough  - cp ongoing, likely due to inf, possibly related to rib fxs

## 2020-10-20 NOTE — H&P ADULT - NSHPLABSRESULTS_GEN_ALL_CORE
T(C): 36.6 (10-19-20 @ 23:23), Max: 36.6 (10-19-20 @ 18:17)  HR: 63 (10-19-20 @ 23:23) (63 - 75)  BP: 119/74 (10-19-20 @ 23:23) (119/74 - 121/70)  RR: 16 (10-19-20 @ 23:23) (16 - 16)  SpO2: 98% (10-19-20 @ 23:23) (98% - 98%)                        11.4   2.34  )-----------( 214      ( 19 Oct 2020 20:05 )             35.1     10-19    139  |  105  |  18  ----------------------------<  92  3.8   |  28  |  0.80    Ca    8.3<L>      19 Oct 2020 20:05    TPro  7.7  /  Alb  3.5  /  TBili  0.4  /  DBili  x   /  AST  49<H>  /  ALT  34  /  AlkPhos  83  10-19    LIVER FUNCTIONS - ( 19 Oct 2020 20:05 )  Alb: 3.5 g/dL / Pro: 7.7 gm/dL / ALK PHOS: 83 U/L / ALT: 34 U/L / AST: 49 U/L / GGT: x           PT/INR - ( 19 Oct 2020 20:05 )   PT: 12.8 sec;   INR: 1.11 ratio         PTT - ( 19 Oct 2020 20:05 )  PTT:29.1 sec        ondansetron Injectable 4 milliGRAM(s) IV Push every 6 hours PRN  sodium chloride 0.9%. 1000 milliLiter(s) IV Continuous <Continuous> T(C): 36.6 (10-19-20 @ 23:23), Max: 36.6 (10-19-20 @ 18:17)  HR: 63 (10-19-20 @ 23:23) (63 - 75)  BP: 119/74 (10-19-20 @ 23:23) (119/74 - 121/70)  RR: 16 (10-19-20 @ 23:23) (16 - 16)  SpO2: 98% (10-19-20 @ 23:23) (98% - 98%)                        11.4   2.34  )-----------( 214      ( 19 Oct 2020 20:05 )             35.1     10-19    139  |  105  |  18  ----------------------------<  92  3.8   |  28  |  0.80    Ca    8.3<L>      19 Oct 2020 20:05    TPro  7.7  /  Alb  3.5  /  TBili  0.4  /  DBili  x   /  AST  49<H>  /  ALT  34  /  AlkPhos  83  10-19    LIVER FUNCTIONS - ( 19 Oct 2020 20:05 )  Alb: 3.5 g/dL / Pro: 7.7 gm/dL / ALK PHOS: 83 U/L / ALT: 34 U/L / AST: 49 U/L / GGT: x           PT/INR - ( 19 Oct 2020 20:05 )   PT: 12.8 sec;   INR: 1.11 ratio         PTT - ( 19 Oct 2020 20:05 )  PTT:29.1 sec    < from: CT Abdomen and Pelvis w/ IV Cont (10.19.20 @ 22:06) >    IMPRESSION:  1.  Minimally displaced fractures involving the right ninth and 10th ribs laterally, unchanged from the prior.  2.  Apparent filling defect involving a segmental pulmonary arterial branch of the right upper lobe. This is not well seen on sagittal and coronal views and is felt very likely to be artifactual. D-dimer level may be able to exclude pulmonary embolism. Otherwise, consider repeat with dedicated CT PE technique.  3.  Tree-in-bud nodularity in the lung bases, unchanged from prior. Couple stable pulmonary nodules. These likely represent infectious bronchiolitis.    < end of copied text >        ondansetron Injectable 4 milliGRAM(s) IV Push every 6 hours PRN  sodium chloride 0.9%. 1000 milliLiter(s) IV Continuous <Continuous>

## 2020-10-20 NOTE — H&P ADULT - NSHPPHYSICALEXAM_GEN_ALL_CORE
PHYSICAL EXAM:    Vital Signs Last 24 Hrs  T(C): 36.6 (19 Oct 2020 23:23), Max: 36.6 (19 Oct 2020 18:17)  T(F): 97.8 (19 Oct 2020 23:23), Max: 97.9 (19 Oct 2020 18:17)  HR: 63 (19 Oct 2020 23:23) (63 - 75)  BP: 119/74 (19 Oct 2020 23:23) (119/74 - 121/70)  BP(mean): --  RR: 16 (19 Oct 2020 23:23) (16 - 16)  SpO2: 98% (19 Oct 2020 23:23) (98% - 98%)    GENERAL: Pt lying in bed comfortably in NAD  HEENT:  Atraumatic, EOMI, PERRL, conjunctiva and sclera clear, MMM  NECK: Supple, No JVD  CHEST/LUNG: Clear to auscultation bilaterally; No rales, rhonchi, wheezing or rubs. Unlabored respirations  HEART: Regular rate and rhythm; No murmurs, rubs, or gallops  ABDOMEN: Bowel sounds present; Soft, Nontender, Nondistended. No guarding or rigidity    EXTREMITIES:  2+ Peripheral Pulses, brisk capillary refill. No clubbing, cyanosis, or edema  NEUROLOGICAL:  Alert & Oriented X3, speech clear. Answers questions appropriately. Full and equal strength B/L upper and lower extremities. No deficits   MSK: FROM x 4 extremities   SKIN: No rashes or lesions PHYSICAL EXAM:    Vital Signs Last 24 Hrs  T(C): 36.6 (19 Oct 2020 23:23), Max: 36.6 (19 Oct 2020 18:17)  T(F): 97.8 (19 Oct 2020 23:23), Max: 97.9 (19 Oct 2020 18:17)  HR: 63 (19 Oct 2020 23:23) (63 - 75)  BP: 119/74 (19 Oct 2020 23:23) (119/74 - 121/70)  BP(mean): --  RR: 16 (19 Oct 2020 23:23) (16 - 16)  SpO2: 98% (19 Oct 2020 23:23) (98% - 98%)    GENERAL: Pt lying in bed comfortably in NAD  HEENT:  Atraumatic, EOMI, PERRL, conjunctiva and sclera clear, MMM  NECK: Supple, No JVD  CHEST/LUNG: Clear to auscultation bilaterally; Unlabored respirations  HEART: Regular rate and rhythm  ABDOMEN: Bowel sounds present; Soft, diffusely mildly tender, mildly distended. No guarding or rigidity    EXTREMITIES:  2+ Peripheral Pulses, . No edema  NEUROLOGICAL:  Alert & Oriented X3, No focal deficits   MSK: FROM x 4 extremities   SKIN: No lesions, ?vitiligo

## 2020-10-20 NOTE — H&P ADULT - ASSESSMENT
31 y/o with PMHx of HIV (last CD4 144 from 10/13), GBS x 2, asthma, ?seizure/CVA, marijuana/tobacco/cocaine abuse, recent admission for 3 days for CP and hemetemasis, pt signed out AMA presents to the ED complaining of abdominal pain, bloody stools and diarrhea.     IMPROVE VTE Individual Risk Assessment    RISK                                                                Points    [  ] Previous VTE                                                  3    [  ] Thrombophilia                                               2    [  ] Lower limb paralysis                                      2        (unable to hold up >15 seconds)      [  ] Current Cancer                                              2         (within 6 months)    [  ] Immobilization > 24 hrs                                1    [  ] ICU/CCU stay > 24 hours                              1    [  ] Age > 60                                                      1    IMPROVE VTE Score ____0_____    IMPROVE Score 0-1: Low Risk, No VTE prophylaxis required for most patients, encourage ambulation.   IMPROVE Score 2-3: At risk, pharmacologic VTE prophylaxis is indicated for most patients (in the absence of a contraindication)  IMPROVE Score > or = 4: High Risk, pharmacologic VTE prophylaxis is indicated for most patients (in the absence of a contraindication) 29 y/o with PMHx of HIV (last CD4 144 from 10/13), GBS x 2, asthma, Hepatitis C, ?seizure/CVA, marijuana/tobacco/cocaine abuse, known hx of noncompliance, recent admission for 3 days for cp, PNA and per GI note hemoptysis at [place] after presenting to [facility] prior and signing out AMA, presents with 1 week of cough with productive sputum, cp, as well as HA and today developed vomiting and diarrhea.      IMPROVE VTE Individual Risk Assessment    RISK                                                                Points    [  ] Previous VTE                                                  3    [  ] Thrombophilia                                               2    [  ] Lower limb paralysis                                      2        (unable to hold up >15 seconds)      [  ] Current Cancer                                              2         (within 6 months)    [  ] Immobilization > 24 hrs                                1    [  ] ICU/CCU stay > 24 hours                              1    [  ] Age > 60                                                      1    IMPROVE VTE Score ____0_____    IMPROVE Score 0-1: Low Risk, No VTE prophylaxis required for most patients, encourage ambulation.   IMPROVE Score 2-3: At risk, pharmacologic VTE prophylaxis is indicated for most patients (in the absence of a contraindication)  IMPROVE Score > or = 4: High Risk, pharmacologic VTE prophylaxis is indicated for most patients (in the absence of a contraindication)

## 2020-10-20 NOTE — H&P ADULT - NSICDXPASTMEDICALHX_GEN_ALL_CORE_FT
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

## 2020-10-20 NOTE — H&P ADULT - PROBLEM SELECTOR PLAN 1
- guaiac neg however per ED, stook looks marroon colored  - f/u rpt  - trend H/H  - CT A/P no acute pathology  - f/u stool cultures - guaiac neg however per ED, stool looks maroon colored  - f/u rpt  - trend H/H  - CT A/P possible PE, however Dimer neg  - f/u stool cultures

## 2020-10-20 NOTE — H&P ADULT - PROBLEM SELECTOR PROBLEM 2
HIV infection, unspecified symptom status Pneumonia due to infectious organism, unspecified laterality, unspecified part of lung

## 2020-10-20 NOTE — DISCHARGE NOTE PROVIDER - HOSPITAL COURSE
31 y/o with PMHx of HIV (last CD4 144 from 10/13), GBS x 2, asthma, Hepatitis C, ?seizure/CVA, marijuana/tobacco/cocaine abuse, known hx of noncompliance, recent admission for 3 days for cp, PNA, hemetemesis however per GI note hemoptysis at [place] after presenting to [facility] prior and signing out AMA, presents with 1 week of cough with productive sputum, cp, as well as HA and today developed vomiting and diarrhea. Pt reports dark red bloody stools x 3 episodes. Pt also reports diffuse abdominal discomfort and distension. Pt denies fever, chills, SOB, n/v. Per last admx pt was discharged on Levaquin for 3 more days for atypical PNA and Bactrim DS for PJP ppx however when asked pt reports he was not discharged on anything.     Pt left AMA. Has capacity to make medical decision.

## 2020-10-21 LAB
SARS-COV-2 IGG SERPL QL IA: NEGATIVE — SIGNIFICANT CHANGE UP
SARS-COV-2 IGM SERPL IA-ACNC: 0.06 INDEX — SIGNIFICANT CHANGE UP

## 2020-10-22 DIAGNOSIS — K92.1 MELENA: ICD-10-CM

## 2020-10-22 DIAGNOSIS — Z86.73 PERSONAL HISTORY OF TRANSIENT ISCHEMIC ATTACK (TIA), AND CEREBRAL INFARCTION WITHOUT RESIDUAL DEFICITS: ICD-10-CM

## 2020-10-22 DIAGNOSIS — J45.909 UNSPECIFIED ASTHMA, UNCOMPLICATED: ICD-10-CM

## 2020-10-22 DIAGNOSIS — B19.20 UNSPECIFIED VIRAL HEPATITIS C WITHOUT HEPATIC COMA: ICD-10-CM

## 2020-10-22 DIAGNOSIS — R19.7 DIARRHEA, UNSPECIFIED: ICD-10-CM

## 2020-10-22 DIAGNOSIS — Z21 ASYMPTOMATIC HUMAN IMMUNODEFICIENCY VIRUS [HIV] INFECTION STATUS: ICD-10-CM

## 2020-10-22 DIAGNOSIS — Z72.0 TOBACCO USE: ICD-10-CM

## 2020-10-22 DIAGNOSIS — R11.2 NAUSEA WITH VOMITING, UNSPECIFIED: ICD-10-CM

## 2020-10-22 DIAGNOSIS — J32.9 CHRONIC SINUSITIS, UNSPECIFIED: ICD-10-CM

## 2020-10-22 DIAGNOSIS — Z91.14 PATIENT'S OTHER NONCOMPLIANCE WITH MEDICATION REGIMEN: ICD-10-CM

## 2020-10-22 DIAGNOSIS — F14.19 COCAINE ABUSE WITH UNSPECIFIED COCAINE-INDUCED DISORDER: ICD-10-CM

## 2020-10-22 DIAGNOSIS — T50.996A UNDERDOSING OF OTHER DRUGS, MEDICAMENTS AND BIOLOGICAL SUBSTANCES, INITIAL ENCOUNTER: ICD-10-CM

## 2020-10-22 DIAGNOSIS — J18.9 PNEUMONIA, UNSPECIFIED ORGANISM: ICD-10-CM

## 2020-10-22 DIAGNOSIS — R07.9 CHEST PAIN, UNSPECIFIED: ICD-10-CM

## 2020-10-22 DIAGNOSIS — G89.29 OTHER CHRONIC PAIN: ICD-10-CM

## 2020-10-25 ENCOUNTER — INPATIENT (INPATIENT)
Facility: HOSPITAL | Age: 30
LOS: 1 days | Discharge: HOME | End: 2020-10-27
Attending: INTERNAL MEDICINE | Admitting: INTERNAL MEDICINE
Payer: MEDICAID

## 2020-10-25 VITALS
RESPIRATION RATE: 18 BRPM | TEMPERATURE: 97 F | SYSTOLIC BLOOD PRESSURE: 125 MMHG | DIASTOLIC BLOOD PRESSURE: 66 MMHG | HEART RATE: 75 BPM | OXYGEN SATURATION: 99 %

## 2020-10-25 DIAGNOSIS — Z98.890 OTHER SPECIFIED POSTPROCEDURAL STATES: Chronic | ICD-10-CM

## 2020-10-25 LAB
ALBUMIN SERPL ELPH-MCNC: 4.3 G/DL — SIGNIFICANT CHANGE UP (ref 3.3–5)
ALP SERPL-CCNC: 76 U/L — SIGNIFICANT CHANGE UP (ref 40–120)
ALT FLD-CCNC: 20 U/L — SIGNIFICANT CHANGE UP (ref 4–41)
ANION GAP SERPL CALC-SCNC: 10 MMO/L — SIGNIFICANT CHANGE UP (ref 7–14)
AST SERPL-CCNC: 33 U/L — SIGNIFICANT CHANGE UP (ref 4–40)
BASE EXCESS BLDV CALC-SCNC: 2.5 MMOL/L — SIGNIFICANT CHANGE UP
BASE EXCESS BLDV CALC-SCNC: 3.2 MMOL/L — SIGNIFICANT CHANGE UP
BASOPHILS # BLD AUTO: 0.01 K/UL — SIGNIFICANT CHANGE UP (ref 0–0.2)
BASOPHILS NFR BLD AUTO: 0.2 % — SIGNIFICANT CHANGE UP (ref 0–2)
BILIRUB SERPL-MCNC: 0.4 MG/DL — SIGNIFICANT CHANGE UP (ref 0.2–1.2)
BLOOD GAS VENOUS - CREATININE: 0.73 MG/DL — SIGNIFICANT CHANGE UP (ref 0.5–1.3)
BLOOD GAS VENOUS - CREATININE: 0.74 MG/DL — SIGNIFICANT CHANGE UP (ref 0.5–1.3)
BUN SERPL-MCNC: 12 MG/DL — SIGNIFICANT CHANGE UP (ref 7–23)
CALCIUM SERPL-MCNC: 9.2 MG/DL — SIGNIFICANT CHANGE UP (ref 8.4–10.5)
CHLORIDE BLDV-SCNC: 102 MMOL/L — SIGNIFICANT CHANGE UP (ref 96–108)
CHLORIDE BLDV-SCNC: 104 MMOL/L — SIGNIFICANT CHANGE UP (ref 96–108)
CHLORIDE SERPL-SCNC: 102 MMOL/L — SIGNIFICANT CHANGE UP (ref 98–107)
CO2 SERPL-SCNC: 27 MMOL/L — SIGNIFICANT CHANGE UP (ref 22–31)
CREAT SERPL-MCNC: 0.69 MG/DL — SIGNIFICANT CHANGE UP (ref 0.5–1.3)
CULTURE RESULTS: SIGNIFICANT CHANGE UP
CULTURE RESULTS: SIGNIFICANT CHANGE UP
EOSINOPHIL # BLD AUTO: 0.05 K/UL — SIGNIFICANT CHANGE UP (ref 0–0.5)
EOSINOPHIL NFR BLD AUTO: 1 % — SIGNIFICANT CHANGE UP (ref 0–6)
GAS PNL BLDV: 137 MMOL/L — SIGNIFICANT CHANGE UP (ref 136–146)
GAS PNL BLDV: 137 MMOL/L — SIGNIFICANT CHANGE UP (ref 136–146)
GLUCOSE BLDV-MCNC: 65 MG/DL — LOW (ref 70–99)
GLUCOSE BLDV-MCNC: 98 MG/DL — SIGNIFICANT CHANGE UP (ref 70–99)
GLUCOSE SERPL-MCNC: 106 MG/DL — HIGH (ref 70–99)
HCO3 BLDV-SCNC: 25 MMOL/L — SIGNIFICANT CHANGE UP (ref 20–27)
HCO3 BLDV-SCNC: 27 MMOL/L — SIGNIFICANT CHANGE UP (ref 20–27)
HCT VFR BLD CALC: 35 % — LOW (ref 39–50)
HCT VFR BLDV CALC: 33.5 % — LOW (ref 39–51)
HCT VFR BLDV CALC: 37.7 % — LOW (ref 39–51)
HGB BLD-MCNC: 11.2 G/DL — LOW (ref 13–17)
HGB BLDV-MCNC: 10.8 G/DL — LOW (ref 13–17)
HGB BLDV-MCNC: 12.3 G/DL — LOW (ref 13–17)
IMM GRANULOCYTES NFR BLD AUTO: 0.2 % — SIGNIFICANT CHANGE UP (ref 0–1.5)
LACTATE BLDV-MCNC: 1.8 MMOL/L — SIGNIFICANT CHANGE UP (ref 0.5–2)
LACTATE BLDV-MCNC: 2.8 MMOL/L — HIGH (ref 0.5–2)
LYMPHOCYTES # BLD AUTO: 2.59 K/UL — SIGNIFICANT CHANGE UP (ref 1–3.3)
LYMPHOCYTES # BLD AUTO: 51.3 % — HIGH (ref 13–44)
MCHC RBC-ENTMCNC: 28.9 PG — SIGNIFICANT CHANGE UP (ref 27–34)
MCHC RBC-ENTMCNC: 32 % — SIGNIFICANT CHANGE UP (ref 32–36)
MCV RBC AUTO: 90.4 FL — SIGNIFICANT CHANGE UP (ref 80–100)
MONOCYTES # BLD AUTO: 0.6 K/UL — SIGNIFICANT CHANGE UP (ref 0–0.9)
MONOCYTES NFR BLD AUTO: 11.9 % — SIGNIFICANT CHANGE UP (ref 2–14)
NEUTROPHILS # BLD AUTO: 1.79 K/UL — LOW (ref 1.8–7.4)
NEUTROPHILS NFR BLD AUTO: 35.4 % — LOW (ref 43–77)
NRBC # FLD: 0 K/UL — SIGNIFICANT CHANGE UP (ref 0–0)
PCO2 BLDV: 47 MMHG — SIGNIFICANT CHANGE UP (ref 41–51)
PCO2 BLDV: 57 MMHG — HIGH (ref 41–51)
PH BLDV: 7.32 PH — SIGNIFICANT CHANGE UP (ref 7.32–7.43)
PH BLDV: 7.39 PH — SIGNIFICANT CHANGE UP (ref 7.32–7.43)
PLATELET # BLD AUTO: 188 K/UL — SIGNIFICANT CHANGE UP (ref 150–400)
PMV BLD: 10.4 FL — SIGNIFICANT CHANGE UP (ref 7–13)
PO2 BLDV: 34 MMHG — LOW (ref 35–40)
PO2 BLDV: 59 MMHG — HIGH (ref 35–40)
POTASSIUM BLDV-SCNC: 3.2 MMOL/L — LOW (ref 3.4–4.5)
POTASSIUM BLDV-SCNC: 3.5 MMOL/L — SIGNIFICANT CHANGE UP (ref 3.4–4.5)
POTASSIUM SERPL-MCNC: 3.6 MMOL/L — SIGNIFICANT CHANGE UP (ref 3.5–5.3)
POTASSIUM SERPL-SCNC: 3.6 MMOL/L — SIGNIFICANT CHANGE UP (ref 3.5–5.3)
PROT SERPL-MCNC: 7.8 G/DL — SIGNIFICANT CHANGE UP (ref 6–8.3)
RBC # BLD: 3.87 M/UL — LOW (ref 4.2–5.8)
RBC # FLD: 12.8 % — SIGNIFICANT CHANGE UP (ref 10.3–14.5)
SAO2 % BLDV: 58.8 % — LOW (ref 60–85)
SAO2 % BLDV: 89.3 % — HIGH (ref 60–85)
SODIUM SERPL-SCNC: 139 MMOL/L — SIGNIFICANT CHANGE UP (ref 135–145)
SPECIMEN SOURCE: SIGNIFICANT CHANGE UP
SPECIMEN SOURCE: SIGNIFICANT CHANGE UP
WBC # BLD: 5.05 K/UL — SIGNIFICANT CHANGE UP (ref 3.8–10.5)
WBC # FLD AUTO: 5.05 K/UL — SIGNIFICANT CHANGE UP (ref 3.8–10.5)

## 2020-10-25 PROCEDURE — 70450 CT HEAD/BRAIN W/O DYE: CPT | Mod: 26

## 2020-10-25 PROCEDURE — 71046 X-RAY EXAM CHEST 2 VIEWS: CPT | Mod: 26

## 2020-10-25 PROCEDURE — 99285 EMERGENCY DEPT VISIT HI MDM: CPT

## 2020-10-25 PROCEDURE — 71250 CT THORAX DX C-: CPT | Mod: 26

## 2020-10-25 RX ORDER — SODIUM CHLORIDE 9 MG/ML
1000 INJECTION, SOLUTION INTRAVENOUS ONCE
Refills: 0 | Status: COMPLETED | OUTPATIENT
Start: 2020-10-25 | End: 2020-10-25

## 2020-10-25 RX ORDER — ACETAMINOPHEN 500 MG
650 TABLET ORAL ONCE
Refills: 0 | Status: COMPLETED | OUTPATIENT
Start: 2020-10-25 | End: 2020-10-25

## 2020-10-25 RX ADMIN — Medication 650 MILLIGRAM(S): at 21:22

## 2020-10-25 RX ADMIN — SODIUM CHLORIDE 1000 MILLILITER(S): 9 INJECTION, SOLUTION INTRAVENOUS at 21:22

## 2020-10-25 NOTE — ED PROVIDER NOTE - PHYSICAL EXAMINATION
CONSTITUTIONAL: well-appearing, in NAD  SKIN: Warm dry, normal skin turgor  HEAD: NCAT  EYES: EOMI, PERRLA, no scleral icterus, conjunctiva pink  ENT: normal pharynx with no erythema or exudates  NECK: Nuchal rigidity. Pt did not tolerate being laid supine due to headache.  CARD: RRR, no murmurs.  RESP: clear to ausculation b/l. No crackles or wheezing, no coarse breath sounds.  ABD: soft, non-tender, non-distended, no rebound or guarding.  EXT: No pedal edema, no calf tenderness  NEURO: normal motor. normal sensory. Photophobia during exam.   PSYCH: Cooperative, appropriate. SKIN: Warm dry, normal skin turgor  HEAD: NCAT  EYES: EOMI, PERRLA, no scleral icterus, conjunctiva pink  ENT: normal pharynx with no erythema or exudates  NECK: +Nuchal rigidity. Pt did not tolerate being laid supine due to headache.  CARD: RRR, no murmurs.  RESP: clear to ausculation b/l. No crackles or wheezing, no coarse breath sounds.  ABD: soft, non-tender, non-distended, no rebound or guarding.  EXT: No pedal edema, no calf tenderness  NEURO: Photophobia during exam. normal motor. normal sensory. EXCEPT BL LE weakness, needs help moving legs on bed [place] since 2016  PSYCH: Cooperative, appropriate.
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
## 2020-10-25 NOTE — ED PROVIDER NOTE - NS ED ROS FT
Constitutional:  (+) fever, (+) chills, (-) lethargy  Eyes:  (-) eye pain (-) visual changes  ENMT: (-) nasal discharge, (-) sore throat. (+) neck pain or stiffness  Cardiac: (+) chest pain L side radiating to back (-) palpitations  Respiratory:  (-) cough (-) respiratory distress.   GI:  (-) nausea (-) vomiting (-) diarrhea (-) abdominal pain (+) constipation  :  (-) dysuria (-) frequency (-) burning.  MS:  (-) back pain (-) joint pain. (+) neck pain  Neuro:  (+) headache (-) numbness (-) tingling (-) focal weakness  Skin:  (-) rash  Except as documented in the HPI,  all other systems are negative

## 2020-10-25 NOTE — ED PROVIDER NOTE - CLINICAL SUMMARY MEDICAL DECISION MAKING FREE TEXT BOX
30m pmh HIV+ unk cd4 count off HAART for several months presents to ED for 1 day cough, L sided chest pain radiating to back, + fever along w/ headache, neck tenderness, photophobia on exam, headache worse w/ supine positioning on physical exam. Concern for meningitis vs pneumonia, considering viral URI. 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 Concern for meningitis vs pneumonia, considering viral URI. Will head CT, LP, cbc cmp bc ua uc chest ct, cxr. Admit.

## 2020-10-25 NOTE — ED PROVIDER NOTE - CARE PLAN
Principal Discharge DX:	Fever and chills  Secondary Diagnosis:	Back pain  Secondary Diagnosis:	HIV (human immunodeficiency virus infection)

## 2020-10-25 NOTE — ED PROVIDER NOTE - ATTENDING CONTRIBUTION TO CARE
Attending Attestation: Dr. Neal  I have personally performed a history and physical examination of the patient and discussed management with the resident as well as the patient.  I reviewed the resident's note and agree with the documented findings and plan of care.  I have authored and modified critical sections of the Provider Note, including but not limited to HPI, Physical Exam and MDM. 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 Concern for meningitis vs pneumonia, considering viral URI. Will head CT, LP, cbc cmp bc ua uc chest ct, cxr. Admit.
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
## 2020-10-25 NOTE — ED ADULT NURSE NOTE - OBJECTIVE STATEMENT
Pt received in intake rm #3, 30Y M Aox4, ambulatory c/o cough and headache x3 days associated with intermittent fevers. PMHX HIV. Pt reports last took tylenol around 830AM today. Denies any chest pain, sob, dizziness, n/v/d. Pt appears in NAD, VS as charted, IV placed 20G Lft forearm, labs sent, medicated as ordered. Pt taken to Xray at this time, pending urine.

## 2020-10-25 NOTE — ED PROVIDER NOTE - PMH
HIV (human immunodeficiency virus infection)     GBS (Guillain Badin syndrome)    HIV (human immunodeficiency virus infection)

## 2020-10-26 DIAGNOSIS — Z29.9 ENCOUNTER FOR PROPHYLACTIC MEASURES, UNSPECIFIED: ICD-10-CM

## 2020-10-26 DIAGNOSIS — R50.9 FEVER, UNSPECIFIED: ICD-10-CM

## 2020-10-26 DIAGNOSIS — B20 HUMAN IMMUNODEFICIENCY VIRUS [HIV] DISEASE: ICD-10-CM

## 2020-10-26 DIAGNOSIS — F20.9 SCHIZOPHRENIA, UNSPECIFIED: ICD-10-CM

## 2020-10-26 DIAGNOSIS — R07.9 CHEST PAIN, UNSPECIFIED: ICD-10-CM

## 2020-10-26 LAB
4/8 RATIO: 0.21 RATIO — LOW (ref 0.9–3.6)
ABS CD8: 1548 /UL — HIGH (ref 142–740)
APPEARANCE UR: CLEAR — SIGNIFICANT CHANGE UP
B PERT DNA SPEC QL NAA+PROBE: SIGNIFICANT CHANGE UP
BACTERIA # UR AUTO: NEGATIVE — SIGNIFICANT CHANGE UP
BILIRUB UR-MCNC: NEGATIVE — SIGNIFICANT CHANGE UP
BLOOD UR QL VISUAL: NEGATIVE — SIGNIFICANT CHANGE UP
C PNEUM DNA SPEC QL NAA+PROBE: SIGNIFICANT CHANGE UP
CD16+CD56+ CELLS NFR BLD: 4 % — LOW (ref 5–23)
CD16+CD56+ CELLS NFR SPEC: 75 /UL — SIGNIFICANT CHANGE UP (ref 71–410)
CD19 BLASTS SPEC-ACNC: 137 /UL — SIGNIFICANT CHANGE UP (ref 84–469)
CD19 BLASTS SPEC-ACNC: 7 % — SIGNIFICANT CHANGE UP (ref 6–24)
CD3 BLASTS SPEC-ACNC: 1874 /UL — HIGH (ref 672–1870)
CD3 BLASTS SPEC-ACNC: 89 % — HIGH (ref 59–83)
CD4 %: 15 % — LOW (ref 30–62)
CD8 %: 71 % — HIGH (ref 12–36)
COLOR SPEC: YELLOW — SIGNIFICANT CHANGE UP
ERYTHROCYTE [SEDIMENTATION RATE] IN BLOOD: 22 MM/HR — HIGH (ref 1–15)
FLUAV H1 2009 PAND RNA SPEC QL NAA+PROBE: SIGNIFICANT CHANGE UP
FLUAV H1 RNA SPEC QL NAA+PROBE: SIGNIFICANT CHANGE UP
FLUAV H3 RNA SPEC QL NAA+PROBE: SIGNIFICANT CHANGE UP
FLUAV SUBTYP SPEC NAA+PROBE: SIGNIFICANT CHANGE UP
FLUBV RNA SPEC QL NAA+PROBE: SIGNIFICANT CHANGE UP
GLUCOSE UR-MCNC: NEGATIVE — SIGNIFICANT CHANGE UP
HADV DNA SPEC QL NAA+PROBE: SIGNIFICANT CHANGE UP
HCOV PNL SPEC NAA+PROBE: SIGNIFICANT CHANGE UP
HMPV RNA SPEC QL NAA+PROBE: SIGNIFICANT CHANGE UP
HPIV1 RNA SPEC QL NAA+PROBE: SIGNIFICANT CHANGE UP
HPIV2 RNA SPEC QL NAA+PROBE: SIGNIFICANT CHANGE UP
HPIV3 RNA SPEC QL NAA+PROBE: SIGNIFICANT CHANGE UP
HPIV4 RNA SPEC QL NAA+PROBE: SIGNIFICANT CHANGE UP
HYALINE CASTS # UR AUTO: NEGATIVE — SIGNIFICANT CHANGE UP
KETONES UR-MCNC: NEGATIVE — SIGNIFICANT CHANGE UP
LEUKOCYTE ESTERASE UR-ACNC: NEGATIVE — SIGNIFICANT CHANGE UP
NITRITE UR-MCNC: NEGATIVE — SIGNIFICANT CHANGE UP
PH UR: 6.5 — SIGNIFICANT CHANGE UP (ref 5–8)
PROT UR-MCNC: 20 — SIGNIFICANT CHANGE UP
RAPID RVP RESULT: SIGNIFICANT CHANGE UP
RBC CASTS # UR COMP ASSIST: SIGNIFICANT CHANGE UP (ref 0–?)
RSV RNA SPEC QL NAA+PROBE: SIGNIFICANT CHANGE UP
RV+EV RNA SPEC QL NAA+PROBE: SIGNIFICANT CHANGE UP
SARS-COV-2 RNA SPEC QL NAA+PROBE: SIGNIFICANT CHANGE UP
SP GR SPEC: 1.03 — SIGNIFICANT CHANGE UP (ref 1–1.04)
SQUAMOUS # UR AUTO: SIGNIFICANT CHANGE UP
T-CELL CD4 SUBSET PNL BLD: 318 /UL — LOW (ref 489–1457)
UROBILINOGEN FLD QL: HIGH
WBC UR QL: SIGNIFICANT CHANGE UP (ref 0–?)

## 2020-10-26 PROCEDURE — 72156 MRI NECK SPINE W/O & W/DYE: CPT | Mod: 26

## 2020-10-26 PROCEDURE — 73010 X-RAY EXAM OF SHOULDER BLADE: CPT | Mod: 26,RT

## 2020-10-26 PROCEDURE — 99223 1ST HOSP IP/OBS HIGH 75: CPT | Mod: GC

## 2020-10-26 PROCEDURE — 72157 MRI CHEST SPINE W/O & W/DYE: CPT | Mod: 26

## 2020-10-26 PROCEDURE — 99233 SBSQ HOSP IP/OBS HIGH 50: CPT

## 2020-10-26 PROCEDURE — 72158 MRI LUMBAR SPINE W/O & W/DYE: CPT | Mod: 26

## 2020-10-26 RX ORDER — BICTEGRAVIR SODIUM, EMTRICITABINE, AND TENOFOVIR ALAFENAMIDE FUMARATE 30; 120; 15 MG/1; MG/1; MG/1
1 TABLET ORAL DAILY
Refills: 0 | Status: DISCONTINUED | OUTPATIENT
Start: 2020-10-26 | End: 2020-10-27

## 2020-10-26 RX ORDER — MORPHINE SULFATE 50 MG/1
2 CAPSULE, EXTENDED RELEASE ORAL ONCE
Refills: 0 | Status: DISCONTINUED | OUTPATIENT
Start: 2020-10-26 | End: 2020-10-26

## 2020-10-26 RX ORDER — FLUTICASONE PROPIONATE 50 MCG
1 SPRAY, SUSPENSION NASAL ONCE
Refills: 0 | Status: COMPLETED | OUTPATIENT
Start: 2020-10-26 | End: 2020-10-26

## 2020-10-26 RX ORDER — AMPICILLIN TRIHYDRATE 250 MG
2 CAPSULE ORAL EVERY 4 HOURS
Refills: 0 | Status: DISCONTINUED | OUTPATIENT
Start: 2020-10-26 | End: 2020-10-26

## 2020-10-26 RX ORDER — CEFTRIAXONE 500 MG/1
2000 INJECTION, POWDER, FOR SOLUTION INTRAMUSCULAR; INTRAVENOUS EVERY 12 HOURS
Refills: 0 | Status: DISCONTINUED | OUTPATIENT
Start: 2020-10-26 | End: 2020-10-26

## 2020-10-26 RX ORDER — AMPICILLIN TRIHYDRATE 250 MG
2 CAPSULE ORAL ONCE
Refills: 0 | Status: COMPLETED | OUTPATIENT
Start: 2020-10-26 | End: 2020-10-26

## 2020-10-26 RX ORDER — CEFTRIAXONE 500 MG/1
2000 INJECTION, POWDER, FOR SOLUTION INTRAMUSCULAR; INTRAVENOUS ONCE
Refills: 0 | Status: COMPLETED | OUTPATIENT
Start: 2020-10-26 | End: 2020-10-26

## 2020-10-26 RX ORDER — QUETIAPINE FUMARATE 200 MG/1
200 TABLET, FILM COATED ORAL AT BEDTIME
Refills: 0 | Status: DISCONTINUED | OUTPATIENT
Start: 2020-10-26 | End: 2020-10-27

## 2020-10-26 RX ORDER — VANCOMYCIN HCL 1 G
1000 VIAL (EA) INTRAVENOUS ONCE
Refills: 0 | Status: COMPLETED | OUTPATIENT
Start: 2020-10-26 | End: 2020-10-26

## 2020-10-26 RX ORDER — VANCOMYCIN HCL 1 G
1250 VIAL (EA) INTRAVENOUS EVERY 12 HOURS
Refills: 0 | Status: DISCONTINUED | OUTPATIENT
Start: 2020-10-26 | End: 2020-10-26

## 2020-10-26 RX ORDER — HYDROMORPHONE HYDROCHLORIDE 2 MG/ML
0.5 INJECTION INTRAMUSCULAR; INTRAVENOUS; SUBCUTANEOUS ONCE
Refills: 0 | Status: DISCONTINUED | OUTPATIENT
Start: 2020-10-26 | End: 2020-10-26

## 2020-10-26 RX ORDER — AMPICILLIN TRIHYDRATE 250 MG
CAPSULE ORAL
Refills: 0 | Status: DISCONTINUED | OUTPATIENT
Start: 2020-10-26 | End: 2020-10-26

## 2020-10-26 RX ORDER — AMPICILLIN TRIHYDRATE 250 MG
2000 CAPSULE ORAL ONCE
Refills: 0 | Status: COMPLETED | OUTPATIENT
Start: 2020-10-26 | End: 2020-10-26

## 2020-10-26 RX ORDER — MORPHINE SULFATE 50 MG/1
2 CAPSULE, EXTENDED RELEASE ORAL EVERY 4 HOURS
Refills: 0 | Status: DISCONTINUED | OUTPATIENT
Start: 2020-10-26 | End: 2020-10-27

## 2020-10-26 RX ADMIN — Medication 216 MILLIGRAM(S): at 05:05

## 2020-10-26 RX ADMIN — QUETIAPINE FUMARATE 200 MILLIGRAM(S): 200 TABLET, FILM COATED ORAL at 21:49

## 2020-10-26 RX ADMIN — Medication 250 MILLIGRAM(S): at 01:21

## 2020-10-26 RX ADMIN — Medication 166.67 MILLIGRAM(S): at 13:20

## 2020-10-26 RX ADMIN — MORPHINE SULFATE 2 MILLIGRAM(S): 50 CAPSULE, EXTENDED RELEASE ORAL at 00:10

## 2020-10-26 RX ADMIN — BICTEGRAVIR SODIUM, EMTRICITABINE, AND TENOFOVIR ALAFENAMIDE FUMARATE 1 TABLET(S): 30; 120; 15 TABLET ORAL at 18:13

## 2020-10-26 RX ADMIN — Medication 2 MILLIGRAM(S): at 07:18

## 2020-10-26 RX ADMIN — Medication 216 GRAM(S): at 12:53

## 2020-10-26 RX ADMIN — HYDROMORPHONE HYDROCHLORIDE 0.5 MILLIGRAM(S): 2 INJECTION INTRAMUSCULAR; INTRAVENOUS; SUBCUTANEOUS at 00:33

## 2020-10-26 RX ADMIN — CEFTRIAXONE 100 MILLIGRAM(S): 500 INJECTION, POWDER, FOR SOLUTION INTRAMUSCULAR; INTRAVENOUS at 01:12

## 2020-10-26 NOTE — H&P ADULT - NSHPREVIEWOFSYSTEMS_GEN_ALL_CORE
REVIEW OF SYSTEMS:    CONSTITUTIONAL: +weakness, +fevers, or chills  EYES/ENT: No visual changes;  No vertigo or throat pain   NECK: No pain or stiffness  RESPIRATORY: +cough, +wheezing,. no hemoptysis; +shortness of breath  CARDIOVASCULAR: +chest pain  GASTROINTESTINAL: No abdominal or epigastric pain. No nausea, vomiting, or hematemesis; No diarrhea or constipation. No melena or hematochezia.  GENITOURINARY: +mild dysuria, no increased frequency or hematuria  NEUROLOGICAL: +chronic LE weakness from GBS  SKIN: No itching, rashes

## 2020-10-26 NOTE — CONSULT NOTE ADULT - SUBJECTIVE AND OBJECTIVE BOX
HPI:  30M with HIV/AIDS with poor adherence, documented history of substance abuse, possible Schizophrenia and Guillain Whitt[place] Syndrome.   Presented 10/25/20 for acute onset chest pain and fevers followed by back pain and headaches.   On chart review he's been presenting to hospitals multiples times for chest pain.   There was concern in the ED for meningitis due to new back pain and neck stiffness which he still reports. LP was deferred due to acute pain at start of procedure. MRI spine unrevealing.   He is very sleepy today, barely talks to me.   When I returned later he was more awake and eating lunch. Still complaints of terrible pain, cough, dyspnea and fevers.   Says he's been adherent with Biktarvy since a recent switch from Genvoya.     PAST MEDICAL & SURGICAL HISTORY:  GBS (Guillain Whitt[place] syndrome)  HIV (human immunodeficiency virus infection)  HIV disease  Chronic sinusitis  Cocaine abuse  Closed fracture of multiple ribs of right side, initial encounter  CVA (cerebral vascular accident)  Asthma  Guillain-[place]itt[place]  HIV (human immunodeficiency virus infection) from birth  History of orthopedic surgery left arm    Allergies    Ceclor (Unknown)    Intolerances        ANTIMICROBIALS:  bictegravir 50 mG/emtricitabine 200 mG/tenofovir alafenamide 25 mG (BIKTARVY) 1 daily      OTHER MEDS:  morphine  - Injectable 2 milliGRAM(s) IV Push every 4 hours PRN  QUEtiapine 200 milliGRAM(s) Oral at bedtime    SOCIAL HISTORY: Lives with his mom and dad    FAMILY HISTORY:  Family history unknown    ROS:  Unobtainable because: poor historian   Constitutional: fever  Eye: no vision changes  ENT: no sore throat, no rhinorrhea  Cardiovascular: chest pain  Respiratory: SOB, cough  GI:  no abd pain, no vomiting, no diarrhea  urinary: no dysuria   musculoskeletal: back and neck pain   skin: no rash  neurology: headache. no change in mental status  psych: no anxiety    Physical Exam:  General: awake, drowsy but answers questions sometimes with just a nod or mumbles. non toxic  Head: atraumatic, normocephalic  Eyes: normal sclera and conjunctiva  ENT: no thrush. no LAD, neck supple  Cardio: regular rate and rhythm   Respiratory: nonlabored on room air, clear bilaterally, no wheezing  abd: soft, bowel sounds present, not tender  : no suprapubic tenderness, no gonzales  Musculoskeletal: no focal joint swelling, no edema. cannot put his ear to his shoulder or chin to chest due to pain   Skin: no rash  vascular: no phlebitis  Neurologic: moves both arms/hands well, spreading butter on his roll   psych: apathetic       Drug Dosing Weight  Height (cm): 177.8 (26 Oct 2020 10:54)  Weight (kg): 80.5 (26 Oct 2020 10:54)  BMI (kg/m2): 25.5 (26 Oct 2020 10:54)  BSA (m2): 1.98 (26 Oct 2020 10:54)    Vital Signs Last 24 Hrs  T(F): 98.5 (10-26-20 @ 14:00), Max: 98.5 (10-26-20 @ 14:00)    Vital Signs Last 24 Hrs  HR: 65 (10-26-20 @ 14:00) (51 - 75)  BP: 117/61 (10-26-20 @ 14:00) (113/76 - 133/68)  RR: 18 (10-26-20 @ 14:00)  SpO2: 100% (10-26-20 @ 14:00) (99% - 100%)  Wt(kg): --                          11.2   5.05  )-----------( 188      ( 25 Oct 2020 21:28 )             35.0       10-25    139  |  102  |  12  ----------------------------<  106<H>  3.6   |  27  |  0.69    Ca    9.2      25 Oct 2020 21:28    TPro  7.8  /  Alb  4.3  /  TBili  0.4  /  DBili  0.2  /  AST  33  /  ALT  20  /  AlkPhos  76  1025      Urinalysis Basic - ( 26 Oct 2020 04:20 )    Color: YELLOW / Appearance: CLEAR / S.029 / pH: 6.5  Gluc: NEGATIVE / Ketone: NEGATIVE  / Bili: NEGATIVE / Urobili: SMALL   Blood: NEGATIVE / Protein: 20 / Nitrite: NEGATIVE   Leuk Esterase: NEGATIVE / RBC: 0-2 / WBC 0-2   Sq Epi: OCC / Non Sq Epi: x / Bacteria: NEGATIVE        MICROBIOLOGY:  Culture - Blood (collected 10-20-20 @ 00:29)  Source: .Blood Blood-Peripheral  Final Report (10-25-20 @ 01:01):    No Growth Final    Culture - Blood (collected 10-20-20 @ 00:27)  Source: .Blood Blood-Peripheral  Final Report (10-25-20 @ 01:01):    No Growth Final    Rapid RVP Result: NotDetec (10-25 @ 21:31)    RADIOLOGY:  Images below reviewed personally  MR Spine w/wo IV Cont (10.26.20 @ 10:24)   Degenerative changes as described above.  No abnormal masses or collections seen.    CT Chest No Cont (10.25.20 @ 22:16)   Mildly degraded by respiratory motion artifact.  A 1.1 cm focus of groundglass opacity in the medial right upper lobe is nonspecific and may be followed up with CT in 3 months.  Tree-in-bud nodules suggest infectious bronchiolitis.

## 2020-10-26 NOTE — PATIENT PROFILE ADULT - NSPROMEDSBROUGHTTOHOSP_GEN_A_NUR
PROCEDURE 

CT head without contrast: 

 

HISTORY 

Fall 3 days ago dizziness bruising over right eye 

 

TECHNIQUE 

Noncontrast axial cross sectional CT scanning of the head was performed. 

 

FINDINGS 

No acute intracranial hemorrhage or midline shift or mass-effect or 

hydrocephalus or extra-axial fluid collection is seen. No focal hypodense 

area is seen to indicate an acute infarct or edema radiographically. No 

skull fracture or pneumocephalus is seen. No opacification of the mastoid 

sinuses or the paranasal sinuses is seen. The maxillary sinuses are not 

completely seen in this study. 

 

IMPRESSION 

No acute intracranial abnormality is seen. 

CT C-spine without contrast: 

Axial helical images of the C-spine obtained without contrast and axial 

coronal and sagittal reconstruction was performed. 

There is straightening of the normal cervical lordosis. There is no loss 

of vertebral body stature there is no prevertebral soft tissue swelling. 

There are posterior disc bulges with flattening of thecal sac however 

there is not appear to be gross flattening the cord. The visualized 

osseous structures appear intact. 

Impression 

No acute findings. 

 

 

 

Electronically signed by: Dudley Naik MD (Mar 17, 2017 18:33:20)
no

## 2020-10-26 NOTE — H&P ADULT - ASSESSMENT
Mr. Garsia is a 31 yo M with a history of HIV since birth off HAART (CD4 144 on 10/13), GBS with residual LE weakness, ?schizophrenia?, and per chart possible history of asthma, seizures/CVA, h/o marijuana, tobacco, and cocaine use presenting with chest pain and headache r/o meningitis.

## 2020-10-26 NOTE — H&P ADULT - ATTENDING COMMENTS
Mr. Dang is a 30 year old male with PMH significant for but not limited to ?congential HIV, polysubstance abuse,  who is presenting from home with intermittent chest pain for the past several weeks. Patient has been admitted to several Health system facilities over the past several weeks with various complaints including chest pain. Diagnosed with 11th rib fracture and has been sent home.     # Headaches   in the ED there was some concern for meningitis as patient was endorsing neck tenderness and neck stiffness. This morning patient is denying neck pain, his ROM is normal with no stiffness or discomfort on flexion, extension or rotation. Being that patient has not had any fevers, has lack of WBC count and has no physical exam findings consistent with diagnosis meningitis seems highly unlikely. Appreciate ID recs. Will discontinue empiric antibiotics and monitor for fevers.   # HIV/ AIDS - noncompliant with HAART. Last CD4 count was 144 earlier this month. Needs to be on PCP prophylaxis. ID consult as above. Will need close OP follow up. SW consult

## 2020-10-26 NOTE — H&P ADULT - PROBLEM SELECTOR PLAN 2
Patient initially presented with meningeal signs and concern for bacterial meningitis. Started in the ED on ampicillin, ceftriaxone, and vancomycin  He has been afebrile, hemodynamically stable with no nuchal rigidity, photophobia or other signs of meningitis.   ID consulted, agreed that meningitis is unlikely. Recommended holding antibiotics, holding off on LP unless patient develops fever or signs of infection.

## 2020-10-26 NOTE — CONSULT NOTE ADULT - ASSESSMENT
30M with poorly controlled HIV/AIDS presented 10/25/20 for acute onset chest pain and fevers followed by back pain and headaches.   Limited history but I don't think he has meningitis or an acute infectious process.   Afebrile here and nontoxic.   The opacities on CT chest are not new and although he reports coughing and dyspnea he looks very comfortable on room air.     Suggest  -monitor off antibiotics   -f/u blood cultures  -f/u HIV viral load and T cells   -resume Biktarvy daily     Spoke with primary team     Dylan Bingham MD   Infectious Disease   Pager [phone]   After 5PM and on weekends please page fellow on call or call [phone]

## 2020-10-26 NOTE — H&P ADULT - NSICDXPASTMEDICALHX_GEN_ALL_CORE_FT
PAST MEDICAL HISTORY:  Asthma     Chronic sinusitis     Closed fracture of multiple ribs of right side, initial encounter     Cocaine abuse     CVA (cerebral vascular accident)     GBS (Guillain Cord syndrome)     Guillain-Cord     HIV (human immunodeficiency virus infection) from birth    HIV (human immunodeficiency virus infection)     HIV disease     Homeless

## 2020-10-26 NOTE — H&P ADULT - NSICDXFAMHXPERTINENTNEGATIVE_GEN_A_CORE_FT
Reassessment    Today's date: 2019  Patient name: Macario Petit  : 1982  MRN: [id number]  Referring provider: Pedro Velez, *  Dx:   Encounter Diagnosis     ICD-10-CM    1  Cervical vertigo R42    2  Benign paroxysmal positional vertigo, unspecified laterality H81 10                    Assessment  Assessment details: Pt demonstrates excellent improvements in headaches and vertigo with decreased severity and frequency since beginning PT  She has increased neck ROM, DNF strength/endurance, postural awareness, and strength of scapular stabilizers  All visuovestibular tasks are now normal  Pt remains a good candidate for outpatient physical therapy to address continued impairments in neck ROM and strength of postural stabilizers  Goals  STG - 3 weeks  1  Independent with HEP - MET  2  Improve postural awareness to decrease forward head posture during daily tasks - MET     LTG - 6 weeks  1  Increase strength of scapular stabilizers by 1 MMT grade to improve posture  - MET  2  Tolerate all functional position changes and head turns without onset of vertigo to promote return to PLOF - MET  3  Improve ROM of C/S retraction and extension by 25% to improve posture and body mechanics  - ongoing  4  Tolerate DNF contraction without substitution at least 10 sec to improve postural stability  - MET      Plan    Patient would benefit from: skilled physical therapy  Planned modality interventions: low level laser therapy  Planned therapy interventions: manual therapy, patient education, neuromuscular re-education, therapeutic exercise and home exercise program  Frequency: 2x week  Duration in weeks: 2-3 weeks  Treatment plan discussed with: patient        Subjective Evaluation  Pt reports decreased frequency and severity of both headaches and dizziness since starting PT  She also reports decreased use of motrin  She reports 75-80% improvement with both headaches and vertigo      Patient Goals  Patient goal: optimize neck ROM        Objective    Dysequilibrium: No  Lightheadedness: No  Vertigo: No  Rocking or Swaying: No         Oscillopsia: No  Diplopia: No  Motion sickness: No  Floating, Swimming, Disconnected: No    Exacerbation Factors:  Bending over: No  Turning Head: No  Rolling in bed: No  Walking: No  Looking up: No  Supine to/from sitting: No  Optokinetic movement: No  Walking in busy environment: No    Duration of Symptoms: a couple minutes     Concurrent Complaints:  Tinnitus:No  Aural Fullness:No  Known hearing loss:No  Nausea, Vomiting: No  Altered Vision: No  Poor Concentration: No  Memory Loss: No  Peripheral Neuropathy:No  Cervical Pain: No   Headache: Yes       PHYSICAL FINDINGS:  Oculomotor ROM : WFL  Resting nystagmus: No  Gaze holding nystagmus No   Smooth pursuit Normal    Vertical Saccades:Normal  Horizontal Saccades:Normal  Convergence: Normal    Cover/Uncover/Crosscover Test: Normal    Head thrust (room light): Normal      Positional testing: Right Left   Serafin Rausch pike  negative  negative   Roll test:  negative  negative       Cervical ROM:  Retraction: 75% pain  Flexion: WFL  Extension: 65  Right rotation:80  Left rotation:80  Right lateral flexion:30 pain  Left lateral flexion:38    Deep Neck Flexor Endurance = 10 sec  Subjective Visual Vertical Bucket Test = WNL  Cervical Torsion Smooth Pursuit = WNL  Neck Torsion Nystagmus Test = WNL  Joint Position Error Test = WNL bilaterally      Scap Stability Strength  Horiz Abd    R =5    L = 5  Lower Trap R = 4   L = 4  Retractors  R = 5   L = 5  Lat Thumb Up R = 4+ L = 4+            Precautions: none    Daily Treatment Diary       Manuals 10/1 10/8 10/14 10/17 10/29 10/31 11/4      SOR KT KT KT KT KT KT KT      C/S traction + retraction  KT KT KT KT KT KT      B OA mobs  KT KT KT KT KT KT      T/S and lower C/S PAs KT KT KT KT KT KT KT      Exercise Diary              Bwd UBE             Posture t-band 10x ea red  15x ea  20x ea red 20x ea grn 25x ea grn 30x ea grn      Lower trap t-band  10x red 15x 20x red 20x grn 25x grn 30x grn      Lower trap wall slide + lift off  10x 15x red 20x red 20x grn 25x grn 30x grn      Prone YTI  10x ea 15x ea 20x ea 20x ea 30x ea 30x ea      Prone scap sq  5"x10 5"x15 5"x20 5"x20 5"x30 5"x30      WTI + serr punch supine roll             Supine retraction  w/ biofeedback  10"x10 No BF 10"x10 10"x10 no BF 10"x10 no BF 10"x10 no BF       Seated retraction 5"x10 5"x10 10"x15 NV 5"x10 5"x15 W/ ext 10x      Self T/S mobs on foam roll   2' 2' 2' 2'       Doorway pec str   30"x3 NV 30"x3        L OA str on towel roll      20"x3                                              Postural Ed Sleep, sitting                                                                                          Modalities pertinent medical conditions

## 2020-10-26 NOTE — H&P ADULT - NSHPSOCIALHISTORY_GEN_ALL_CORE
Lives with parents  Does not work  Less than half a pack a day cigarettes  No other drug use Lives with parents and does not work.  Smokes less than half a pack a day of cigarettes  Reports no other drug use.

## 2020-10-26 NOTE — H&P ADULT - PROBLEM SELECTOR PLAN 1
Patient currently complaining of intermittent chest pain.   Patient has been hemodynamically stable and said that he gets occasional chest pain.   Symptoms unlikely to be ACS, will get EKG to further evaluate.

## 2020-10-26 NOTE — H&P ADULT - NSHPLABSRESULTS_GEN_ALL_CORE
11.2   5.05  )-----------( 188      ( 25 Oct 2020 21:28 )             35.0       10-25    139  |  102  |  12  ----------------------------<  106<H>  3.6   |  27  |  0.69    Ca    9.2      25 Oct 2020 21:28    TPro  7.8  /  Alb  4.3  /  TBili  0.4  /  DBili  0.2  /  AST  33  /  ALT  20  /  AlkPhos  76  10-25              Urinalysis Basic - ( 26 Oct 2020 04:20 )    Color: YELLOW / Appearance: CLEAR / S.029 / pH: 6.5  Gluc: NEGATIVE / Ketone: NEGATIVE  / Bili: NEGATIVE / Urobili: SMALL   Blood: NEGATIVE / Protein: 20 / Nitrite: NEGATIVE   Leuk Esterase: NEGATIVE / RBC: 0-2 / WBC 0-2   Sq Epi: OCC / Non Sq Epi: x / Bacteria: NEGATIVE            Lactate Trend            CAPILLARY BLOOD GLUCOSE            Culture Results:   No Growth Final (10-20 @ 00:29)  Culture Results:   No Growth Final (10-20 @ 00:27)  Culture Results:   No Growth Final (10-11 @ 23:10)  Culture Results:   No Growth Final (10-11 @ 23:09)

## 2020-10-26 NOTE — H&P ADULT - HISTORY OF PRESENT ILLNESS
Mr. Garsia is a 29 yo M with a history of schizophrenia, HIV since birth but off HAART for month. Patient says that he is treated for HIV at [facility] but does not know the name of his doctor and was told to stop HAART because of liver damage.   He reports that he suddenly developed a coug   Headache   Yesterday cough, fever on c  chest pressure came and go. Once every 2 months.   [facility] HIV   Dr. Ortega gomez [facility]       Mr. Garsia is a 31 yo M with a history of HIV since birth off HAART (CD4 144 on 10/13), GBS with residual LE weakness, ?schizophrenia?, and per chart possible history of asthma, seizures/CVA, h/o marijuana, tobacco, and cocaine use. He has recently been admitted to [facility] for chest pain, cough, n/v, bloody diarrhea, abdominal pain. Per the chart he left AMA on previous admissions and was discharged on Levaquin for atypical PNA and Bactrim DS for PJP prophylaxis.     The patient reports that he suddenly developed a headache, cough, chest pain, and fever yesterday and came into the hospital. He says that he started Biktarvy 5 days ago and is treated for HIV at [facility] but does not know the name of his doctor. He says that he experiences "chest pressure" about every 2 months that comes and goes.   Dr. Vivas [facility]       Mr. Garsia is a 31 yo M with a history of HIV since birth off HAART (CD4 144 on 10/13), GBS with residual LE weakness, ?schizophrenia?, and per chart possible history of asthma, seizures/CVA, h/o marijuana, tobacco, and cocaine use. He has recently been admitted to [facility] for chest pain, cough, n/v, bloody diarrhea, abdominal pain. Per the chart he left AMA on previous admissions and was discharged on Levaquin for atypical PNA and Bactrim DS for PJP prophylaxis.     The patient reports that he suddenly developed a headache, cough, chest pain, and fever yesterday and came into the hospital. He says that he started Biktarvy 5 days ago and is treated for HIV at [facility] but does not know the name of his doctor. He says that he experiences "chest pressure" about every 2 months that comes and goes.     In the ED, the patient reported having a bilateral temporal headache with photophobia and neck pain. Out of concern for bacterial meningitis, the patient was started on ampicillin, ceftriaxone, vancomycin. An LP was ordered but during positioning for the LP he noted extreme midline pain and tenderness at the lumbar spine. There was concern for an epidural abscess so the patient received an MRI which was unremarkable.

## 2020-10-27 ENCOUNTER — TRANSCRIPTION ENCOUNTER (OUTPATIENT)
Age: 30
End: 2020-10-27

## 2020-10-27 VITALS
TEMPERATURE: 99 F | OXYGEN SATURATION: 100 % | HEART RATE: 71 BPM | DIASTOLIC BLOOD PRESSURE: 73 MMHG | SYSTOLIC BLOOD PRESSURE: 139 MMHG | RESPIRATION RATE: 18 BRPM

## 2020-10-27 LAB
ALBUMIN SERPL ELPH-MCNC: 3.8 G/DL — SIGNIFICANT CHANGE UP (ref 3.3–5)
ALP SERPL-CCNC: 69 U/L — SIGNIFICANT CHANGE UP (ref 40–120)
ALT FLD-CCNC: 13 U/L — SIGNIFICANT CHANGE UP (ref 4–41)
ANION GAP SERPL CALC-SCNC: 11 MMO/L — SIGNIFICANT CHANGE UP (ref 7–14)
AST SERPL-CCNC: 24 U/L — SIGNIFICANT CHANGE UP (ref 4–40)
BILIRUB SERPL-MCNC: 0.3 MG/DL — SIGNIFICANT CHANGE UP (ref 0.2–1.2)
BUN SERPL-MCNC: 9 MG/DL — SIGNIFICANT CHANGE UP (ref 7–23)
CALCIUM SERPL-MCNC: 9 MG/DL — SIGNIFICANT CHANGE UP (ref 8.4–10.5)
CHLORIDE SERPL-SCNC: 108 MMOL/L — HIGH (ref 98–107)
CO2 SERPL-SCNC: 23 MMOL/L — SIGNIFICANT CHANGE UP (ref 22–31)
CREAT SERPL-MCNC: 0.78 MG/DL — SIGNIFICANT CHANGE UP (ref 0.5–1.3)
CULTURE RESULTS: SIGNIFICANT CHANGE UP
GLUCOSE SERPL-MCNC: 74 MG/DL — SIGNIFICANT CHANGE UP (ref 70–99)
HCT VFR BLD CALC: 35 % — LOW (ref 39–50)
HGB BLD-MCNC: 11.1 G/DL — LOW (ref 13–17)
HIV-1 VIRAL LOAD RESULT: HIGH
HIV1 RNA # SERPL NAA+PROBE: SIGNIFICANT CHANGE UP
HIV1 RNA SER-IMP: SIGNIFICANT CHANGE UP
HIV1 RNA SERPL NAA+PROBE-ACNC: HIGH
HIV1 RNA SERPL NAA+PROBE-LOG#: 4.88 — SIGNIFICANT CHANGE UP
MAGNESIUM SERPL-MCNC: 1.6 MG/DL — SIGNIFICANT CHANGE UP (ref 1.6–2.6)
MCHC RBC-ENTMCNC: 28.2 PG — SIGNIFICANT CHANGE UP (ref 27–34)
MCHC RBC-ENTMCNC: 31.7 % — LOW (ref 32–36)
MCV RBC AUTO: 88.8 FL — SIGNIFICANT CHANGE UP (ref 80–100)
NRBC # FLD: 0 K/UL — SIGNIFICANT CHANGE UP (ref 0–0)
PHOSPHATE SERPL-MCNC: 3.8 MG/DL — SIGNIFICANT CHANGE UP (ref 2.5–4.5)
PLATELET # BLD AUTO: 189 K/UL — SIGNIFICANT CHANGE UP (ref 150–400)
PMV BLD: 10.5 FL — SIGNIFICANT CHANGE UP (ref 7–13)
POTASSIUM SERPL-MCNC: 3.8 MMOL/L — SIGNIFICANT CHANGE UP (ref 3.5–5.3)
POTASSIUM SERPL-SCNC: 3.8 MMOL/L — SIGNIFICANT CHANGE UP (ref 3.5–5.3)
PROT SERPL-MCNC: 7 G/DL — SIGNIFICANT CHANGE UP (ref 6–8.3)
RBC # BLD: 3.94 M/UL — LOW (ref 4.2–5.8)
RBC # FLD: 12.6 % — SIGNIFICANT CHANGE UP (ref 10.3–14.5)
SODIUM SERPL-SCNC: 142 MMOL/L — SIGNIFICANT CHANGE UP (ref 135–145)
SPECIMEN SOURCE: SIGNIFICANT CHANGE UP
WBC # BLD: 2.94 K/UL — LOW (ref 3.8–10.5)
WBC # FLD AUTO: 2.94 K/UL — LOW (ref 3.8–10.5)

## 2020-10-27 PROCEDURE — 99239 HOSP IP/OBS DSCHRG MGMT >30: CPT | Mod: GC

## 2020-10-27 PROCEDURE — 99232 SBSQ HOSP IP/OBS MODERATE 35: CPT

## 2020-10-27 RX ORDER — BICTEGRAVIR SODIUM, EMTRICITABINE, AND TENOFOVIR ALAFENAMIDE FUMARATE 30; 120; 15 MG/1; MG/1; MG/1
1 TABLET ORAL
Qty: 30 | Refills: 0
Start: 2020-10-27 | End: 2020-11-25

## 2020-10-27 RX ORDER — QUETIAPINE FUMARATE 200 MG/1
25 TABLET, FILM COATED ORAL
Qty: 0 | Refills: 0 | DISCHARGE

## 2020-10-27 RX ORDER — BICTEGRAVIR SODIUM, EMTRICITABINE, AND TENOFOVIR ALAFENAMIDE FUMARATE 30; 120; 15 MG/1; MG/1; MG/1
1 TABLET ORAL
Qty: 0 | Refills: 0 | DISCHARGE

## 2020-10-27 RX ADMIN — BICTEGRAVIR SODIUM, EMTRICITABINE, AND TENOFOVIR ALAFENAMIDE FUMARATE 1 TABLET(S): 30; 120; 15 TABLET ORAL at 12:43

## 2020-10-27 NOTE — PROGRESS NOTE ADULT - ATTENDING COMMENTS
Mr. Dang is a 30 year old male with PMH significant for but not limited to ?congential HIV, polysubstance abuse,  who is presenting from home with intermittent chest pain for the past several weeks. Patient has been admitted to several [facility] facilities over the past several weeks with various complaints including chest pain. Diagnosed with 11th rib fracture and has been sent home.     # Headaches   - CT head with no intracranial hemorrhage or mass effect. Unlikely to have meningitis as symptoms improving off antibiotics, patient is afebrile without white count, and has no physical exam findings that would correlate. Blood cultures prelim negative.    # HIV - Last CD4 count was 144 earlier this month. repeat is 318. no longer needs to be on PCP prophylaxis. Appreciate ID consult. Will need close OP follow up. Has appointment scheduled with ID at [facility] for next week to establish care.     Plan for discharge.

## 2020-10-27 NOTE — PROGRESS NOTE ADULT - SUBJECTIVE AND OBJECTIVE BOX
Elbert Raymundo MD, PGY1  Pager [phone]    PROGRESS NOTE:     Patient is a 30y old  Male who presents with a chief complaint of HIV with cough, fever (26 Oct 2020 18:49)      SUBJECTIVE / OVERNIGHT EVENTS:  No acute events overnight. Patient says that he still is experiencing chest pain which is somewhat improved. He still has a headache. Not febrile since admission. Says that he is eating and drinking well. Was lethargic this morning.      REVIEW OF SYSTEMS:    CONSTITUTIONAL: Lethargic, no fever or chills.  EYES/ENT: No visual changes;  No vertigo or throat pain   NECK: No pain or stiffness  RESPIRATORY: No cough, wheezing, hemoptysis; No shortness of breath  CARDIOVASCULAR: +chest pain, palpitations  GASTROINTESTINAL: No abdominal or epigastric pain. No nausea, vomiting, or hematemesis; No diarrhea or constipation. No melena or hematochezia.  GENITOURINARY: No dysuria, frequency or hematuria  NEUROLOGICAL: +headache  SKIN: No itching, rashes      MEDICATIONS  (STANDING):  bictegravir 50 mG/emtricitabine 200 mG/tenofovir alafenamide 25 mG (BIKTARVY) 1 Tablet(s) Oral daily  QUEtiapine 200 milliGRAM(s) Oral at bedtime    MEDICATIONS  (PRN):  morphine  - Injectable 2 milliGRAM(s) IV Push every 4 hours PRN Severe Pain (7 - 10)      CAPILLARY BLOOD GLUCOSE        I&O's Summary    26 Oct 2020 07:01  -  27 Oct 2020 07:00  --------------------------------------------------------  IN: 0 mL / OUT: 1650 mL / NET: -1650 mL        PHYSICAL EXAM:  Vital Signs Last 24 Hrs  T(C): 36.5 (27 Oct 2020 06:34), Max: 36.9 (26 Oct 2020 14:00)  T(F): 97.7 (27 Oct 2020 06:34), Max: 98.5 (26 Oct 2020 14:00)  HR: 60 (27 Oct 2020 06:34) (59 - 65)  BP: 119/71 (27 Oct 2020 06:34) (116/62 - 119/71)  BP(mean): --  RR: 17 (27 Oct 2020 06:34) (17 - 18)  SpO2: 100% (27 Oct 2020 06:34) (100% - 100%)    GENERAL: Lethargic, NAD, well-developed  HEAD:  Atraumatic, Normocephalic  EYES: EOMI, conjunctiva and sclera clear  NECK: Supple, no nuchal rigidity  CHEST/LUNG: Clear to auscultation bilaterally; No wheeze  HEART: Regular rate and rhythm; No murmurs, rubs, or gallops  ABDOMEN: Soft, Nontender, Nondistended; Bowel sounds present  EXTREMITIES:  2+ Peripheral Pulses, No clubbing, cyanosis, or edema  PSYCH: AAOx3  NEUROLOGY: non-focal  SKIN: No rashes or lesions      LABS:                        11.1   2.94  )-----------( 189      ( 27 Oct 2020 05:44 )             35.0     10-27    142  |  108<H>  |  9   ----------------------------<  74  3.8   |  23  |  0.78    Ca    9.0      27 Oct 2020 05:44  Phos  3.8     10-  Mg     1.6     10-27    TPro  7.0  /  Alb  3.8  /  TBili  0.3  /  DBili  x   /  AST  24  /  ALT  13  /  AlkPhos  69  10-27          Urinalysis Basic - ( 26 Oct 2020 04:20 )    Color: YELLOW / Appearance: CLEAR / S.029 / pH: 6.5  Gluc: NEGATIVE / Ketone: NEGATIVE  / Bili: NEGATIVE / Urobili: SMALL   Blood: NEGATIVE / Protein: 20 / Nitrite: NEGATIVE   Leuk Esterase: NEGATIVE / RBC: 0-2 / WBC 0-2   Sq Epi: OCC / Non Sq Epi: x / Bacteria: NEGATIVE        Culture - Urine (collected 26 Oct 2020 05:28)  Source: .Urine Catheterized  Final Report (27 Oct 2020 07:02):    <10,000 CFU/mL Normal Urogenital Jeimy    Culture - Blood (collected 25 Oct 2020 23:05)  Source: .Blood Blood  Preliminary Report (27 Oct 2020 01:02):    No growth to date.    Culture - Blood (collected 25 Oct 2020 23:05)  Source: .Blood Blood  Preliminary Report (27 Oct 2020 01:02):    No growth to date.        RADIOLOGY & ADDITIONAL TESTS:  Results Reviewed:   Imaging Personally Reviewed:  Electrocardiogram Personally Reviewed:    COORDINATION OF CARE:  Care Discussed with Consultants/Other Providers [Y/N]:  Prior or Outpatient Records Reviewed [Y/N]:

## 2020-10-27 NOTE — PROGRESS NOTE ADULT - ASSESSMENT
30M with poorly controlled HIV/AIDS presented 10/25/20 for acute onset chest pain and fevers followed by back pain and headaches.   Vague nonspecific symptoms without evidence of infection. Afebrile. Blood cultures negative to date. The opacities on CT chest are not new and there's no clinical pneumonia.   Suspect lack of psychosocial support may be a factor for his frequent hospital visits.     Suggest  -monitor off antibiotics   -monitor blood cultures  -Biktarvy daily with outpatient follow up with his HIV provider     Dylan Bingham MD   Infectious Disease   Pager [phone]   After 5PM and on weekends please page fellow on call or call [phone]

## 2020-10-27 NOTE — DISCHARGE NOTE NURSING/CASE MANAGEMENT/SOCIAL WORK - PATIENT PORTAL LINK FT
You can access the FollowMyHealth Patient Portal offered by [facility] by registering at the following website: [email or web address]. By joining [facility]’s FollowMyHealth portal, you will also be able to view your health information using other applications (apps) compatible with our system.

## 2020-10-27 NOTE — PROGRESS NOTE ADULT - PROBLEM SELECTOR PLAN 2
Patient initially presented with meningeal signs and concern for bacterial meningitis. Started in the ED on ampicillin, ceftriaxone, and vancomycin  He has been afebrile, hemodynamically stable with no nuchal rigidity, photophobia or other signs of meningitis.   ID consulted, agreed that meningitis is unlikely. No fevers off antibiotics, holding off on LP unless patient develops fever or signs of infection.

## 2020-10-27 NOTE — CHART NOTE - NSCHARTNOTEFT_GEN_A_CORE
Patient was medically optimized for discharge but removed his own IV and left the hospital before taking his discharge paperwork. Called the patient to provide information regarding follow up appointments.     Elbert Raymundo MD, MPH, PGY1  Pager [phone]

## 2020-10-27 NOTE — DISCHARGE NOTE PROVIDER - CARE PROVIDER_API CALL
[facility],   Phone: [phone]  Fax: (   )    -  Established Patient  Scheduled Appointment: 11/02/2020 12:00 AM   [facility],   Phone: [phone]  Fax: (   )    -  Established Patient  Scheduled Appointment: 11/18/2020 11:00 AM

## 2020-10-27 NOTE — PROGRESS NOTE ADULT - PROBLEM SELECTOR PLAN 4
Continue Seroquel 200 mg qHS  Consider psych consult  Social work consulted given history of AMA and repeat admissions for similar symptoms Continue Seroquel 200 mg qHS  Will recommend outpatient f/u  Social work consulted given history of AMA and repeat admissions for similar symptoms

## 2020-10-27 NOTE — DISCHARGE NOTE PROVIDER - NSDCCPTREATMENT_GEN_ALL_CORE_FT
PRINCIPAL PROCEDURE  Procedure: MRI T-spine  Findings and Treatment: INTERPRETATION:  Clinical indication: Back pain. Fever. Rule out epidural abscess.  MRI of the cervical thoracic and lumbar spine was performed using sagittal T1-T2 and STIR sequence. Axial T1 and T2-weighted sequences were performed as well. The patient was injected with approximately 7.5 cc of Gadavist IV with no IV contrast discarded. Sagittal T1-weighted sequence performed fat suppression. Axial T1-weighted sequences were performed.  Lumbar spine:  Loss of the normal lumbar lordosis is seen.  Disc desiccation is seen involving the C2-3 C3-4 C4-5 C5-6 and C6-7 levels.  The vertebralbody height alignment and marrow signal appear normal  C2-3: Normal  C3-4: Normal  C4-5: Normal  C5-6: Normal  C6-7: Normal  C7-T1: Normal  The spinal cord demonstrates normal signal and caliber.  Evaluation of the paraspinal soft tissues appear normal.  No abnormal areas of enhancement seen.  No abnormal masses or collections seen.  Thoracic spine:  This exam somewhat limited by motion.  The vertebral body height alignment and marrow signal appear normal.  Disc desiccation is seen involving the T1-2 level.  There are no abnormal disc herniations or significant central or neural foraminal stenosis seen.  The spinal cord demonstrates normal signal and caliber.  Evaluation of the paraspinal soft tissues appear normal.  No abnormal masses or collections seen.  No abnormal areas of enhancement seen.  Lumbar spine:  Loss of the normal lumbar lordosis is seen.  The vertebral body height alignment and marrow signal appear normal  The disc spaces appear preserved.  T12-L1: Normal  L1-2: Normal  L2-3: Normal  L3-4: Bilateral hypertrophic facet changes are seen without significant, as of the spinal canal or either neural foramen.  L4-5: Bilateral hypertrophic facet changes are seen. No significant, as of the spinal canal or either neural foramen.  L5-S1: Bilateral hypertrophic facet changes are seen. No significant compromise of the spinal canal or either neural foramen.  The conus ends at L2 an

## 2020-10-27 NOTE — DISCHARGE NOTE PROVIDER - NSDCMRMEDTOKEN_GEN_ALL_CORE_FT
Biktarvy oral tablet: 1 tab(s) orally once a day  SEROquel 200 mg oral tablet: 1 tab(s) orally once a day (at bedtime)

## 2020-10-27 NOTE — DISCHARGE NOTE PROVIDER - HOSPITAL COURSE
Mr. Garsia is a 29 yo M with a history of HIV since birth off HAART (CD4 144 on 10/13), GBS with residual LE weakness, ?schizophrenia?, and per chart possible history of asthma, seizures/CVA, h/o marijuana, tobacco, and cocaine use. He has recently been admitted to [facility] for chest pain, cough, n/v, bloody diarrhea, abdominal pain. Per the chart he left AMA on previous admissions and was discharged on Levaquin for atypical PNA and Bactrim DS for PJP prophylaxis.     The patient reports that he suddenly developed a headache, cough, chest pain, and fever and came into the hospital. He says that he started Biktarvy 5 days ago and is treated for HIV at [facility] but does not know the name of his doctor. He says that he experiences "chest pressure" about every 2 months that comes and goes.     In the ED, the patient reported having a bilateral temporal headache with photophobia and neck pain. Out of concern for bacterial meningitis, the patient was started on ampicillin, ceftriaxone, vancomycin. An LP was ordered but during positioning for the LP he noted extreme midline pain and tenderness at the lumbar spine. There was concern for an epidural abscess so the patient received an MRI which was unremarkable. The patient remained afebrile and antibiotics were discontinued. He was continued on Biktarvy during his admission and was found to have CD4 count of 318. He exhibited no signs of infection and reported that his chest pain and headache had improved since he was admitted.    The patient was medically stable for discharge with close outpatient follow up with the [facility] and his PCP. He was discharged with 30 days of Biktarvy.   Mr. Garsia is a 29 yo M with a history of HIV since birth off HAART (CD4 144 on 10/13), GBS with residual LE weakness, ?schizophrenia?, and per chart possible history of asthma, seizures/CVA, h/o marijuana, tobacco, and cocaine use. He has recently been admitted to [facility] for chest pain, cough, n/v, bloody diarrhea, abdominal pain. Per the chart he left AMA on previous admissions and was discharged on Levaquin for atypical PNA and Bactrim DS for PJP prophylaxis.     The patient reports that he suddenly developed a headache, cough, chest pain, and fever and came into the hospital. He says that he started Biktarvy 5 days ago and is treated for HIV at [facility] but does not know the name of his doctor. He says that he experiences "chest pressure" about every 2 months that comes and goes.     In the ED, the patient reported having a bilateral temporal headache with photophobia and neck pain. Out of concern for bacterial meningitis, the patient was started on ampicillin, ceftriaxone, vancomycin. An LP was ordered but during positioning for the LP he noted extreme midline pain and tenderness at the lumbar spine. There was concern for an epidural abscess so the patient received an MRI which was unremarkable. The patient remained afebrile and antibiotics were discontinued. He was continued on Biktarvy during his admission and was found to have CD4 count of 318. He exhibited no signs of infection and reported that his chest pain and headache had improved since he was admitted.    The patient was medically stable for discharge and wants to follow up outpatient with [facility] and his PCP. He was discharged with 30 days of Biktarvy.  Apt on 11/18/2020 at 11 AM at [address] [facility] 3rd floor [address]; confirmed w/  there that he will get a reminder phone call the day before

## 2020-10-27 NOTE — DISCHARGE NOTE PROVIDER - NSDCCPCAREPLAN_GEN_ALL_CORE_FT
PRINCIPAL DISCHARGE DIAGNOSIS  Diagnosis: Chest pain  Assessment and Plan of Treatment: You came in with chest pain, headache, and back pain. There was initally concern for meningitis (an infection of the area around your brain and spinal cord) so you were started on antibiotics. We also did an MRI to look for signs of infection which was negative. Since you did not have a fever or other signs of infection we discontinued the antibiotics and you remained without a fever. You were stable for discharge for follow up with your primary care provider.      SECONDARY DISCHARGE DIAGNOSES  Diagnosis: HIV (human immunodeficiency virus infection)  Assessment and Plan of Treatment: We continued you on Biktarvy. Your CD4 count was found to be 318. Please continue Biktarvy and follow up with your HIV doctor at [facility] on 11/2 at 11:00 AM.    Diagnosis: Schizophrenia  Assessment and Plan of Treatment: Please follow up with a psychiatrist to discuss your medications, including Seroquel.     PRINCIPAL DISCHARGE DIAGNOSIS  Diagnosis: Chest pain  Assessment and Plan of Treatment: You came in with chest pain, headache, and back pain. There was initally concern for meningitis (an infection of the area around your brain and spinal cord) so you were started on antibiotics. We also did an MRI to look for signs of infection which was negative. Since you did not have a fever or other signs of infection we discontinued the antibiotics and you remained without a fever. You were stable for discharge for follow up with your primary care provider.      SECONDARY DISCHARGE DIAGNOSES  Diagnosis: Schizophrenia  Assessment and Plan of Treatment: Please follow up with a psychiatrist to discuss your medications, including Seroquel.    Diagnosis: HIV (human immunodeficiency virus infection)  Assessment and Plan of Treatment: We continued you on Biktarvy. Your CD4 count was found to be 318. Please continue Biktarvy and follow up with your HIV doctor at [facility] on 11/18/20 at 11 AM at [address] [facility] 3rd floor [address] (also have a gastroenterology appointment at 9:30 AM on 11/18 at [facility] on the first floor).  You can call the clinic at [phone] if you have any questions

## 2020-10-27 NOTE — DISCHARGE NOTE PROVIDER - PROVIDER TOKENS
FREE:[LAST:[[facility]],PHONE:[phone]],FAX:[(   )    -],SCHEDULEDAPPT:[11/02/2020],SCHEDULEDAPPTTIME:[12:00 AM],ESTABLISHEDPATIENT:[T]] FREE:[LAST:[[facility]],PHONE:[phone]],FAX:[(   )    -],SCHEDULEDAPPT:[11/18/2020],SCHEDULEDAPPTTIME:[11:00 AM],ESTABLISHEDPATIENT:[T]]

## 2020-10-27 NOTE — PROGRESS NOTE ADULT - PROBLEM SELECTOR PLAN 1
Patient still complaining of intermittent chest pain.   Patient has been hemodynamically stable and said that he gets occasional chest pain. Cardiac etiology appears unlikely at this time.   EKG showed sinus bradycardia. Patient still complaining of intermittent chest pain, headache, which he says is improved since yesterday.   Patient has been hemodynamically stable and said that he gets occasional chest pain. Cardiac etiology appears unlikely at this time.   EKG showed sinus bradycardia. Patient still complaining of intermittent chest/back pain, headache, which he says is improved since yesterday.   EKG showed sinus bradycardia with no ST or T wave changes  Patient has been hemodynamically stable and said that he gets occasional chest pain. Cardiac etiology highly unlikely at this time.   Pain consistent with patients known previous rib fracture

## 2020-10-27 NOTE — PROGRESS NOTE ADULT - SUBJECTIVE AND OBJECTIVE BOX
Follow Up: HIV AIDS     Interval History/ROS: He feels better today. Improved headache, neck and back pain. No fevers or chills. Not coughing today.     Allergies  Ceclor (Unknown)    ANTIMICROBIALS:  bictegravir 50 mG/emtricitabine 200 mG/tenofovir alafenamide 25 mG (BIKTARVY) 1 daily    OTHER MEDS:  morphine  - Injectable 2 milliGRAM(s) IV Push every 4 hours PRN  QUEtiapine 200 milliGRAM(s) Oral at bedtime    Vital Signs Last 24 Hrs  T(C): 37.3 (27 Oct 2020 14:18), Max: 37.3 (27 Oct 2020 14:18)  T(F): 99.2 (27 Oct 2020 14:18), Max: 99.2 (27 Oct 2020 14:18)  HR: 71 (27 Oct 2020 14:18) (59 - 71)  BP: 139/73 (27 Oct 2020 14:18) (116/62 - 139/73)  BP(mean): --  RR: 18 (27 Oct 2020 14:18) (17 - 18)  SpO2: 100% (27 Oct 2020 14:18) (100% - 100%)    Physical Exam:  General: awake, alert, non toxic, eating breakfast   Head: atraumatic, normocephalic  Eye: normal sclera and conjunctiva  ENT: no oropharyngeal lesions, no cervical lymphadenopathy   Cardio: regular rate. no chest wall tenderness   Respiratory: nonlabored on room air, clear bilaterally, no wheezing  abd: soft, bowel sounds present, no tenderness  Neurologic: no focal deficit  psych: flat affect                          11.1   2.94  )-----------( 189      ( 27 Oct 2020 05:44 )             35.0       10-27    142  |  108<H>  |  9   ----------------------------<  74  3.8   |  23  |  0.78    Ca    9.0      27 Oct 2020 05:44  Phos  3.8     10-27  Mg     1.6     10-27    TPro  7.0  /  Alb  3.8  /  TBili  0.3  /  DBili  x   /  AST  24  /  ALT  13  /  AlkPhos  69  10-27      Urinalysis Basic - ( 26 Oct 2020 04:20 )    Color: YELLOW / Appearance: CLEAR / S.029 / pH: 6.5  Gluc: NEGATIVE / Ketone: NEGATIVE  / Bili: NEGATIVE / Urobili: SMALL   Blood: NEGATIVE / Protein: 20 / Nitrite: NEGATIVE   Leuk Esterase: NEGATIVE / RBC: 0-2 / WBC 0-2   Sq Epi: OCC / Non Sq Epi: x / Bacteria: NEGATIVE        MICROBIOLOGY:  HIV-1 RNA Quantitative, Viral Load Lo.88 (10-25-20 @ 21:28)    Rapid RVP Result: NotDetec (10-25 @ 21:31)    Full T Cell Subset (10.26.20 @ 17:29)   ABS CD4: 318 /uL   CD4 %: 15 %     Culture - Urine (collected 10-26-20 @ 05:28)  Source: .Urine Catheterized  Final Report (10-27-20 @ 07:02):    <10,000 CFU/mL Normal Urogenital Jeimy    Culture - Blood (collected 10-25-20 @ 23:05)  Source: .Blood Blood  Preliminary Report (10-27-20 @ 01:02):    No growth to date.    Culture - Blood (collected 10-25-20 @ 23:05)  Source: .Blood Blood  Preliminary Report (10-27-20 @ 01:02):    No growth to date.    RADIOLOGY:  Images below reviewed personally

## 2020-10-28 ENCOUNTER — EMERGENCY (EMERGENCY)
Facility: HOSPITAL | Age: 30
LOS: 1 days | Discharge: HOME | End: 2020-10-28
Attending: EMERGENCY MEDICINE
Payer: COMMERCIAL

## 2020-10-28 VITALS
RESPIRATION RATE: 18 BRPM | SYSTOLIC BLOOD PRESSURE: 107 MMHG | DIASTOLIC BLOOD PRESSURE: 60 MMHG | TEMPERATURE: 99 F | HEART RATE: 55 BPM | OXYGEN SATURATION: 98 %

## 2020-10-28 VITALS
WEIGHT: 169.98 LBS | RESPIRATION RATE: 18 BRPM | DIASTOLIC BLOOD PRESSURE: 84 MMHG | HEART RATE: 88 BPM | SYSTOLIC BLOOD PRESSURE: 126 MMHG | TEMPERATURE: 98 F | HEIGHT: 70 IN | OXYGEN SATURATION: 100 %

## 2020-10-28 DIAGNOSIS — Z98.890 OTHER SPECIFIED POSTPROCEDURAL STATES: Chronic | ICD-10-CM

## 2020-10-28 LAB
ALBUMIN SERPL ELPH-MCNC: 4.1 G/DL — SIGNIFICANT CHANGE UP (ref 3.3–5.2)
ALP SERPL-CCNC: 75 U/L — SIGNIFICANT CHANGE UP (ref 40–120)
ALT FLD-CCNC: 16 U/L — SIGNIFICANT CHANGE UP
ANION GAP SERPL CALC-SCNC: 12 MMOL/L — SIGNIFICANT CHANGE UP (ref 5–17)
AST SERPL-CCNC: 32 U/L — SIGNIFICANT CHANGE UP
BILIRUB SERPL-MCNC: 0.5 MG/DL — SIGNIFICANT CHANGE UP (ref 0.4–2)
BUN SERPL-MCNC: 16 MG/DL — SIGNIFICANT CHANGE UP (ref 8–20)
CALCIUM SERPL-MCNC: 9.6 MG/DL — SIGNIFICANT CHANGE UP (ref 8.6–10.2)
CHLORIDE SERPL-SCNC: 102 MMOL/L — SIGNIFICANT CHANGE UP (ref 98–107)
CO2 SERPL-SCNC: 23 MMOL/L — SIGNIFICANT CHANGE UP (ref 22–29)
CREAT SERPL-MCNC: 0.66 MG/DL — SIGNIFICANT CHANGE UP (ref 0.5–1.3)
GLUCOSE SERPL-MCNC: 88 MG/DL — SIGNIFICANT CHANGE UP (ref 70–99)
MAGNESIUM SERPL-MCNC: 1.6 MG/DL — SIGNIFICANT CHANGE UP (ref 1.6–2.6)
NT-PROBNP SERPL-SCNC: 13 PG/ML — SIGNIFICANT CHANGE UP (ref 0–300)
POTASSIUM SERPL-MCNC: 4.2 MMOL/L — SIGNIFICANT CHANGE UP (ref 3.5–5.3)
POTASSIUM SERPL-SCNC: 4.2 MMOL/L — SIGNIFICANT CHANGE UP (ref 3.5–5.3)
PROT SERPL-MCNC: 7.7 G/DL — SIGNIFICANT CHANGE UP (ref 6.6–8.7)
SODIUM SERPL-SCNC: 137 MMOL/L — SIGNIFICANT CHANGE UP (ref 135–145)
TROPONIN T SERPL-MCNC: <0.01 NG/ML — SIGNIFICANT CHANGE UP (ref 0–0.06)
TSH SERPL-MCNC: 0.25 UIU/ML — LOW (ref 0.27–4.2)

## 2020-10-28 PROCEDURE — 85025 COMPLETE CBC W/AUTO DIFF WBC: CPT

## 2020-10-28 PROCEDURE — 85610 PROTHROMBIN TIME: CPT

## 2020-10-28 PROCEDURE — 96374 THER/PROPH/DIAG INJ IV PUSH: CPT

## 2020-10-28 PROCEDURE — 80053 COMPREHEN METABOLIC PANEL: CPT

## 2020-10-28 PROCEDURE — 99284 EMERGENCY DEPT VISIT MOD MDM: CPT | Mod: 25

## 2020-10-28 PROCEDURE — 84484 ASSAY OF TROPONIN QUANT: CPT

## 2020-10-28 PROCEDURE — 82962 GLUCOSE BLOOD TEST: CPT

## 2020-10-28 PROCEDURE — 36415 COLL VENOUS BLD VENIPUNCTURE: CPT

## 2020-10-28 PROCEDURE — 93005 ELECTROCARDIOGRAM TRACING: CPT

## 2020-10-28 PROCEDURE — 93010 ELECTROCARDIOGRAM REPORT: CPT

## 2020-10-28 PROCEDURE — 84443 ASSAY THYROID STIM HORMONE: CPT

## 2020-10-28 PROCEDURE — 85730 THROMBOPLASTIN TIME PARTIAL: CPT

## 2020-10-28 PROCEDURE — 83880 ASSAY OF NATRIURETIC PEPTIDE: CPT

## 2020-10-28 PROCEDURE — 99285 EMERGENCY DEPT VISIT HI MDM: CPT

## 2020-10-28 PROCEDURE — 83735 ASSAY OF MAGNESIUM: CPT

## 2020-10-28 RX ORDER — ONDANSETRON 8 MG/1
4 TABLET, FILM COATED ORAL ONCE
Refills: 0 | Status: COMPLETED | OUTPATIENT
Start: 2020-10-28 | End: 2020-10-28

## 2020-10-28 RX ORDER — KETOROLAC TROMETHAMINE 30 MG/ML
15 SYRINGE (ML) INJECTION ONCE
Refills: 0 | Status: DISCONTINUED | OUTPATIENT
Start: 2020-10-28 | End: 2020-10-28

## 2020-10-28 RX ORDER — SODIUM CHLORIDE 9 MG/ML
1000 INJECTION INTRAMUSCULAR; INTRAVENOUS; SUBCUTANEOUS ONCE
Refills: 0 | Status: COMPLETED | OUTPATIENT
Start: 2020-10-28 | End: 2020-10-28

## 2020-10-28 RX ORDER — MECLIZINE HCL 12.5 MG
25 TABLET ORAL ONCE
Refills: 0 | Status: DISCONTINUED | OUTPATIENT
Start: 2020-10-28 | End: 2020-11-02

## 2020-10-28 RX ADMIN — SODIUM CHLORIDE 1000 MILLILITER(S): 9 INJECTION INTRAMUSCULAR; INTRAVENOUS; SUBCUTANEOUS at 18:48

## 2020-10-28 RX ADMIN — Medication 15 MILLIGRAM(S): at 19:08

## 2020-10-28 NOTE — ED PROVIDER NOTE - PHYSICAL EXAMINATION
Gen: NAD, AOx3  Head: NCAT  HEENT: PERRL, EOMI, oral mucosa moist, normal conjunctiva, neck supple  Lung: CTAB, no respiratory distress  CV: rrr, no murmur, Normal perfusion  Abd: soft, NTND  MSK: No edema, no visible deformities  Neuro: CN II-XII intact, 5/5 UE strength, 3/5 LE weakness, sensation intact, no dysmetria/ataxia  Skin: No rash   Psych: normal affect

## 2020-10-28 NOTE — ED PROVIDER NOTE - NS ED ROS FT
ROS: +CP no SOB. no cough. no fever. +n/v no d/c. no abd pain. no rash. no bleeding. no urinary complaints. no weakness. no vision changes. no HA. no neck/back pain. no extremity swelling/deformity. No change in mental status.

## 2020-10-28 NOTE — ED PROVIDER NOTE - PATIENT PORTAL LINK FT
You can access the FollowMyHealth Patient Portal offered by [facility] by registering at the following website: [email or web address]. By joining [employer]’s FollowMyHealth portal, you will also be able to view your health information using other applications (apps) compatible with our system.

## 2020-10-28 NOTE — ED PROVIDER NOTE - OBJECTIVE STATEMENT
Mr. Garsia is a 29 yo M with a history of HIV since birth off HAART (CD4 144 on 10/13), GBS with residual LE weakness, ?schizophrenia?, and per chart possible history of asthma, seizures/CVA, h/o marijuana, tobacco, and cocaine use. He has recently been admitted to [facility] for chest pain, cough, n/v, bloody diarrhea, abdominal pain. Per the chart he left AMA on previous admissions and was discharged on Levaquin for atypical PNA and Bactrim DS for PJP prophylaxis. DC yesterday from [facility] for w/u of HA/back pain and fever. Since leaving hospital has not eaten, lives at mens shelter, PTA on train had sudden onset palpitations and dizziness. no nauesa. no SOB. no focal weakness. no HA. no CP just palpitaitons and discomfort nonradiating, same as sx/pain in past.

## 2020-10-28 NOTE — ED ADULT NURSE NOTE - OBJECTIVE STATEMENT
pt awake, alert and oriented x3 c/o chest pain, headache, dizziness that began apprx 90 min ago.  NSR on cardiac monitor.  resp even and unlabored.

## 2020-10-28 NOTE — ED PROVIDER NOTE - CLINICAL SUMMARY MEDICAL DECISION MAKING FREE TEXT BOX
patient with multiple visits to various hospitals for different complaints, just D/C after extensive w/u for meningitis/abscess no findings. previous w/u for CP with normal echo, refused stress. no EKG changes, no neuro deficits. patient has not eaten and lives in shelter, possible social issues relatign to patient frequent ED visits and hospitalizations. will tx sx. basic labs and hydration reasses

## 2020-10-28 NOTE — ED ADULT TRIAGE NOTE - CHIEF COMPLAINT QUOTE
pt BIBA from [facility] for headache, dizziness, b/l leg weakness, fatigue, chills and chest pain onset 45min ago while on train. denies known fever. denies cardiac hx. reports used marijuana use this morning. denies any other drug use or alcohol use today. dr. emery called to bedside for eval.

## 2020-10-28 NOTE — ED PROVIDER NOTE - PROGRESS NOTE DETAILS
patient states 'toradol doesn't work, only morphine or dilaudid' explained not indicated, patient will not be receiving any narcotics for atypical CP. pt understands, concern for possible drug seeking behavior -Sidney BARRIGA labs unremarkable, will d/c with ouptt Follow up -Slowey DO

## 2020-10-28 NOTE — ED ADULT NURSE REASSESSMENT NOTE - NS ED NURSE REASSESS COMMENT FT1
Assumed pt care @1935. Report received from day ANDREA Duvall. Pt A&Ox4 c/o chest pain. MD Little made aware. EKG performed. Pt given pain meds as per MD order prior to shift change. VSS. Respirations even & unlabored. NAD present. Awaiting lab results @ this time.

## 2020-10-28 NOTE — ED PROVIDER NOTE - NSFOLLOWUPINSTRUCTIONS_ED_ALL_ED_FT
1. Return to ED for worsening, progressive or any other concerning symptoms   2. Follow up with your primary care doctor in 2-3days   3. Take motrin 400mg every 6 hours as needed for pain and .tylenol   4. Continue your home medications   5. Follow up with [facility] [phone].     Chest Pain    Chest pain can be caused by many different conditions which may or may not be dangerous. Causes include heartburn, lung infections, heart attack, blood clot in lungs, skin infections, strain or damage to muscle, cartilage, or bones, etc. In addition to a history and physical examination, an electrocardiogram (ECG) or other lab tests may have been performed to determine the cause of your chest pain. Follow up with your primary care provider or with a cardiologist as instructed.     SEEK IMMEDIATE MEDICAL CARE IF YOU HAVE ANY OF THE FOLLOWING SYMPTOMS: worsening chest pain, coughing up blood, unexplained back/neck/jaw pain, severe abdominal pain, dizziness or lightheadedness, fainting, shortness of breath, sweaty or clammy skin, vomiting, or racing heart beat. These symptoms may represent a serious problem that is an emergency. Do not wait to see if the symptoms will go away. Get medical help right away. Call 911 and do not drive yourself to the hospital.

## 2020-10-28 NOTE — ED PROVIDER NOTE - PMH
Asthma    Chronic sinusitis    Closed fracture of multiple ribs of right side, initial encounter    Cocaine abuse    CVA (cerebral vascular accident)    GBS (Guillain [place] syndrome)    Guillain-[place]field    HIV (human immunodeficiency virus infection)  from birth  HIV (human immunodeficiency virus infection)    HIV disease    Homeless

## 2020-10-28 NOTE — ED ADULT NURSE NOTE - PMH
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

## 2020-12-11 ENCOUNTER — EMERGENCY (EMERGENCY)
Facility: HOSPITAL | Age: 30
LOS: 1 days | Discharge: HOME | End: 2020-12-11
Attending: STUDENT IN AN ORGANIZED HEALTH CARE EDUCATION/TRAINING PROGRAM | Admitting: STUDENT IN AN ORGANIZED HEALTH CARE EDUCATION/TRAINING PROGRAM
Payer: MEDICAID

## 2020-12-11 VITALS
TEMPERATURE: 98 F | RESPIRATION RATE: 18 BRPM | OXYGEN SATURATION: 100 % | DIASTOLIC BLOOD PRESSURE: 76 MMHG | SYSTOLIC BLOOD PRESSURE: 142 MMHG | HEART RATE: 87 BPM

## 2020-12-11 VITALS
TEMPERATURE: 98 F | HEART RATE: 92 BPM | OXYGEN SATURATION: 100 % | SYSTOLIC BLOOD PRESSURE: 148 MMHG | HEIGHT: 70 IN | RESPIRATION RATE: 16 BRPM | DIASTOLIC BLOOD PRESSURE: 79 MMHG

## 2020-12-11 DIAGNOSIS — Z98.890 OTHER SPECIFIED POSTPROCEDURAL STATES: Chronic | ICD-10-CM

## 2020-12-11 LAB
BASE EXCESS BLDV CALC-SCNC: 4.1 MMOL/L — HIGH (ref -3–2)
BLOOD GAS VENOUS - CREATININE: SIGNIFICANT CHANGE UP MG/DL (ref 0.5–1.3)
BLOOD GAS VENOUS COMPREHENSIVE RESULT: SIGNIFICANT CHANGE UP
CHLORIDE BLDV-SCNC: 104 MMOL/L — SIGNIFICANT CHANGE UP (ref 96–108)
GAS PNL BLDV: 140 MMOL/L — SIGNIFICANT CHANGE UP (ref 136–146)
GLUCOSE BLDV-MCNC: 102 MG/DL — HIGH (ref 70–99)
HCO3 BLDV-SCNC: 27 MMOL/L — SIGNIFICANT CHANGE UP (ref 20–27)
HCT VFR BLDA CALC: 37.7 % — LOW (ref 39–51)
HGB BLD CALC-MCNC: 12.3 G/DL — LOW (ref 13–17)
LACTATE BLDV-MCNC: 2 MMOL/L — SIGNIFICANT CHANGE UP (ref 0.5–2)
PCO2 BLDV: 51 MMHG — SIGNIFICANT CHANGE UP (ref 41–51)
PH BLDV: 7.38 — SIGNIFICANT CHANGE UP (ref 7.32–7.43)
PO2 BLDV: 40 MMHG — SIGNIFICANT CHANGE UP (ref 35–40)
POTASSIUM BLDV-SCNC: 3.2 MMOL/L — LOW (ref 3.4–4.5)
SAO2 % BLDV: 74.9 % — SIGNIFICANT CHANGE UP (ref 60–85)

## 2020-12-11 PROCEDURE — 99285 EMERGENCY DEPT VISIT HI MDM: CPT

## 2020-12-11 RX ORDER — MORPHINE SULFATE 50 MG/1
4 CAPSULE, EXTENDED RELEASE ORAL ONCE
Refills: 0 | Status: DISCONTINUED | OUTPATIENT
Start: 2020-12-11 | End: 2020-12-11

## 2020-12-11 RX ORDER — SODIUM CHLORIDE 9 MG/ML
1000 INJECTION INTRAMUSCULAR; INTRAVENOUS; SUBCUTANEOUS ONCE
Refills: 0 | Status: COMPLETED | OUTPATIENT
Start: 2020-12-11 | End: 2020-12-11

## 2020-12-11 RX ADMIN — MORPHINE SULFATE 4 MILLIGRAM(S): 50 CAPSULE, EXTENDED RELEASE ORAL at 23:39

## 2020-12-11 RX ADMIN — SODIUM CHLORIDE 1000 MILLILITER(S): 9 INJECTION INTRAMUSCULAR; INTRAVENOUS; SUBCUTANEOUS at 22:59

## 2020-12-11 NOTE — ED PROVIDER NOTE - OBJECTIVE STATEMENT
29 yo M, with PMH of HIV since birth off HAART, GBS with residual LE weakness, presents to ER c/o worsening RLQ abdominal pain x4 days. Pt states having sharp pain, constant, 9/10, non-radiating, worse with movements, better after passing gas. Admits fever yesterday 101, took Tylenol w/ improvement. Admits he was constipated for 3 days, had one bowel movement today, bloody. Denies hx of hemorrhoid or pain on defecation. Denies any chills, n/v/d/, dizziness, chest pain, sob, dysuria, weakness, numbness or any other complaints.

## 2020-12-11 NOTE — ED ADULT TRIAGE NOTE - CHIEF COMPLAINT QUOTE
lower abd pain x 1 week, had normal bowel movement with some blood in the stool - dysuria, no other symptoms

## 2020-12-11 NOTE — ED ADULT NURSE NOTE - NSIMPLEMENTINTERV_GEN_ALL_ED
Implemented All Universal Safety Interventions:  [employer] to call system. Call bell, personal items and telephone within reach. Instruct patient to call for assistance. Room bathroom lighting operational. Non-slip footwear when patient is off stretcher. Physically safe environment: no spills, clutter or unnecessary equipment. Stretcher in lowest position, wheels locked, appropriate side rails in place.

## 2020-12-11 NOTE — ED PROVIDER NOTE - ATTENDING CONTRIBUTION TO CARE
31 yo M, with PMH of HIV since birth off HAART, GBS with residual LE weakness, presents to ER c/o worsening RLQ abdominal pain x4 days. pt states he has had a few days of RLQ pain which is currently 8/10. he had some vomiting prior yesterday. he also had fever tmax 101.4. He states he has had some loose stools but it stopped, he is not sure if there was blood inhis stool.   denies + fever, chills, chest pain, SOB, +abdominal pain, +diarrhea, dysuria, syncope, bleeding, new rash,weakness, numbness, blurred vision  +n/v  ROS  otherwise negative as per HPI  Gen: Awake, Alert, WD, WN, NAD  Head:  NC/AT  Eyes:  PERRL, EOMI, Conjunctiva pink, lids normal, no scleral icterus  ENT: OP clear, no exudates, no erythema, uvula midline, TMs clear bilaterally, moist mucus membranes  Neck: supple, nontender, no meningismus, no JVD, trachea midline  Cardiac/CV:  S1 S2, RRR, no M/G/R  Respiratory/Pulm:  CTAB, good air movement, normal resp effort, no wheezes/stridor/retractions/rales/rhonchi  Gastrointestinal/Abdomen:  Soft, tender RLQ, nondistended, decreased BS, no rebound/guarding  Back:  no CVAT, no MLT  Ext:  warm, well perfused, moving all extremities spontaneously, no peripheral edema, distal pulses intact  Skin: intact, no rash  Neuro:  AAOx3, sensation intact, motor 5/5 x 4 extremities, normal gait, speech clear  MDM as above

## 2020-12-11 NOTE — ED PROVIDER NOTE - NSFOLLOWUPINSTRUCTIONS_ED_ALL_ED_FT
Rest, drink plenty of fluids.  Advance activity as tolerated.  Continue all previously prescribed medications as directed. Take Tylenol 650mg (Two 325 mg pills) every 4-6 hours as needed for pain.  Follow up with your primary care physician in 48-72 hours- bring copies of your results.  Return to the ER for worsening or persistent symptoms, and/or ANY NEW OR CONCERNING SYMPTOMS. If you have issues obtaining follow up, please call: [phone] to obtain a doctor or specialist who takes your insurance in your area.

## 2020-12-11 NOTE — ED PROVIDER NOTE - PROGRESS NOTE DETAILS
ANA GARCIA: Patient reassessed, lying comfortably in bed in NAD, denies any complaints. CT A/P: normal appendix, minimal ground glass haziness noted lung bases. Pt unable to give urine. Discussed with attending, Dr. Leal patient can be discharged home to f/u with PCP. The patient was given verbal and written discharge instructions. Specifically, instructions when to return to the ED and when to seek follow-up from their pcp was discussed. Any specialty follow-up was discussed, including how to make an appointment.  Instructions were discussed in simple, plain language and was understood by the patient. The patient understands that should their symptoms worsen or any new symptoms arise, they should return to the ED immediately for further evaluation. All pt's questions were answered. Patient verbalizes understanding.

## 2020-12-11 NOTE — ED ADULT NURSE NOTE - OBJECTIVE STATEMENT
Pt received to intake 4 a&ox4, ambulatory c/o abdominal pain that started 5 days ago. Pt states that the pain is sharp in nature and that it is located in the lower right quadrant. Pt denies any nausea, vomiting. Pt c/o mild intermittent headaches. Pt denies any chest pain, shortness of breath, fevers, chills. Pt in no acute distress at this time. Respirations even and unlabored. Skin warm and dry. 20 gauge IV placed in right back of forearm. Blood drawn and sent to lab. Comfort measures provided. Awaiting further orders. Will continue to monitor.

## 2020-12-11 NOTE — ED PROVIDER NOTE - CLINICAL SUMMARY MEDICAL DECISION MAKING FREE TEXT BOX
31 yo M, with PMH of HIV since birth off HAART, GBS with residual LE weakness, presents to ER c/o worsening RLQ abdominal pain x4 days. Afebrile. Concerning for acute appendicitis. Plan: labs, lipase, CT A/P to r/o appendicitis, give IVF for hydration, pain control, and reassess.

## 2020-12-11 NOTE — ED ADULT NURSE NOTE - PMH
Asthma    Chronic sinusitis    Closed fracture of multiple ribs of right side, initial encounter    Cocaine abuse    CVA (cerebral vascular accident)    GBS (Guillain [place] syndrome)    Guillain-[place]    HIV (human immunodeficiency virus infection)  from birth  HIV (human immunodeficiency virus infection)    HIV disease    Homeless

## 2020-12-11 NOTE — ED PROVIDER NOTE - PMH
Asthma    Chronic sinusitis    Closed fracture of multiple ribs of right side, initial encounter    Cocaine abuse    CVA (cerebral vascular accident)    GBS (Guillain [place]field syndrome)    Guillain-[place]rimfield    HIV (human immunodeficiency virus infection)  from birth  HIV (human immunodeficiency virus infection)    HIV disease    Homeless

## 2020-12-12 LAB
ALBUMIN SERPL ELPH-MCNC: 4 G/DL — SIGNIFICANT CHANGE UP (ref 3.3–5)
ALP SERPL-CCNC: 83 U/L — SIGNIFICANT CHANGE UP (ref 40–120)
ALT FLD-CCNC: 13 U/L — SIGNIFICANT CHANGE UP (ref 4–41)
ANION GAP SERPL CALC-SCNC: 11 MMOL/L — SIGNIFICANT CHANGE UP (ref 7–14)
APTT BLD: 30.2 SEC — SIGNIFICANT CHANGE UP (ref 27–36.3)
AST SERPL-CCNC: 20 U/L — SIGNIFICANT CHANGE UP (ref 4–40)
BASOPHILS # BLD AUTO: 0.02 K/UL — SIGNIFICANT CHANGE UP (ref 0–0.2)
BASOPHILS NFR BLD AUTO: 0.4 % — SIGNIFICANT CHANGE UP (ref 0–2)
BILIRUB SERPL-MCNC: 0.3 MG/DL — SIGNIFICANT CHANGE UP (ref 0.2–1.2)
BLD GP AB SCN SERPL QL: NEGATIVE — SIGNIFICANT CHANGE UP
BUN SERPL-MCNC: 16 MG/DL — SIGNIFICANT CHANGE UP (ref 7–23)
CALCIUM SERPL-MCNC: 9.6 MG/DL — SIGNIFICANT CHANGE UP (ref 8.4–10.5)
CHLORIDE SERPL-SCNC: 97 MMOL/L — LOW (ref 98–107)
CO2 SERPL-SCNC: 26 MMOL/L — SIGNIFICANT CHANGE UP (ref 22–31)
CREAT SERPL-MCNC: 0.92 MG/DL — SIGNIFICANT CHANGE UP (ref 0.5–1.3)
EOSINOPHIL # BLD AUTO: 0.19 K/UL — SIGNIFICANT CHANGE UP (ref 0–0.5)
EOSINOPHIL NFR BLD AUTO: 4.1 % — SIGNIFICANT CHANGE UP (ref 0–6)
GLUCOSE SERPL-MCNC: 103 MG/DL — HIGH (ref 70–99)
HCT VFR BLD CALC: 37.2 % — LOW (ref 39–50)
HGB BLD-MCNC: 12.3 G/DL — LOW (ref 13–17)
IANC: 2.2 K/UL — SIGNIFICANT CHANGE UP (ref 1.5–8.5)
IMM GRANULOCYTES NFR BLD AUTO: 0.2 % — SIGNIFICANT CHANGE UP (ref 0–1.5)
INR BLD: 1.13 RATIO — SIGNIFICANT CHANGE UP (ref 0.88–1.17)
LIDOCAIN IGE QN: 16 U/L — SIGNIFICANT CHANGE UP (ref 7–60)
LYMPHOCYTES # BLD AUTO: 1.77 K/UL — SIGNIFICANT CHANGE UP (ref 1–3.3)
LYMPHOCYTES # BLD AUTO: 37.7 % — SIGNIFICANT CHANGE UP (ref 13–44)
MCHC RBC-ENTMCNC: 29.3 PG — SIGNIFICANT CHANGE UP (ref 27–34)
MCHC RBC-ENTMCNC: 33.1 GM/DL — SIGNIFICANT CHANGE UP (ref 32–36)
MCV RBC AUTO: 88.6 FL — SIGNIFICANT CHANGE UP (ref 80–100)
MONOCYTES # BLD AUTO: 0.5 K/UL — SIGNIFICANT CHANGE UP (ref 0–0.9)
MONOCYTES NFR BLD AUTO: 10.7 % — SIGNIFICANT CHANGE UP (ref 2–14)
NEUTROPHILS # BLD AUTO: 2.2 K/UL — SIGNIFICANT CHANGE UP (ref 1.8–7.4)
NEUTROPHILS NFR BLD AUTO: 46.9 % — SIGNIFICANT CHANGE UP (ref 43–77)
NRBC # BLD: 0 /100 WBCS — SIGNIFICANT CHANGE UP
NRBC # FLD: 0 K/UL — SIGNIFICANT CHANGE UP
PLATELET # BLD AUTO: 262 K/UL — SIGNIFICANT CHANGE UP (ref 150–400)
POTASSIUM SERPL-MCNC: 3.3 MMOL/L — LOW (ref 3.5–5.3)
POTASSIUM SERPL-SCNC: 3.3 MMOL/L — LOW (ref 3.5–5.3)
PROT SERPL-MCNC: 8 G/DL — SIGNIFICANT CHANGE UP (ref 6–8.3)
PROTHROM AB SERPL-ACNC: 12.8 SEC — SIGNIFICANT CHANGE UP (ref 9.8–13.1)
RBC # BLD: 4.2 M/UL — SIGNIFICANT CHANGE UP (ref 4.2–5.8)
RBC # FLD: 12.8 % — SIGNIFICANT CHANGE UP (ref 10.3–14.5)
RH IG SCN BLD-IMP: POSITIVE — SIGNIFICANT CHANGE UP
SARS-COV-2 RNA SPEC QL NAA+PROBE: SIGNIFICANT CHANGE UP
SODIUM SERPL-SCNC: 134 MMOL/L — LOW (ref 135–145)
WBC # BLD: 4.69 K/UL — SIGNIFICANT CHANGE UP (ref 3.8–10.5)
WBC # FLD AUTO: 4.69 K/UL — SIGNIFICANT CHANGE UP (ref 3.8–10.5)

## 2020-12-12 PROCEDURE — 74177 CT ABD & PELVIS W/CONTRAST: CPT | Mod: 26

## 2021-01-04 NOTE — PROGRESS NOTE ADULT - RESPIRATORY AND THORAX
negative Doxepin Counseling:  Patient advised that the medication is sedating and not to drive a car after taking this medication. Patient informed of potential adverse effects including but not limited to dry mouth, urinary retention, and blurry vision.  The patient verbalized understanding of the proper use and possible adverse effects of doxepin.  All of the patient's questions and concerns were addressed.

## 2021-04-12 NOTE — ED ADULT NURSE NOTE - NSFALLRSKUNASSIST_ED_ALL_ED
[FreeTextEntry1] : Patient addresses breast screening, colon screening, skin screening with PMD. 
no

## 2021-04-27 ENCOUNTER — INPATIENT (INPATIENT)
Facility: HOSPITAL | Age: 31
LOS: 1 days | Discharge: HOME | DRG: 203 | End: 2021-04-29
Attending: HOSPITALIST | Admitting: INTERNAL MEDICINE
Payer: MEDICAID

## 2021-04-27 VITALS
OXYGEN SATURATION: 96 % | HEIGHT: 70 IN | WEIGHT: 175.05 LBS | DIASTOLIC BLOOD PRESSURE: 79 MMHG | TEMPERATURE: 97 F | HEART RATE: 99 BPM | SYSTOLIC BLOOD PRESSURE: 141 MMHG | RESPIRATION RATE: 18 BRPM

## 2021-04-27 DIAGNOSIS — Z98.890 OTHER SPECIFIED POSTPROCEDURAL STATES: Chronic | ICD-10-CM

## 2021-04-27 LAB
ALBUMIN SERPL ELPH-MCNC: 4.1 G/DL — SIGNIFICANT CHANGE UP (ref 3.3–5)
ALP SERPL-CCNC: 85 U/L — SIGNIFICANT CHANGE UP (ref 40–120)
ALT FLD-CCNC: 30 U/L — SIGNIFICANT CHANGE UP (ref 12–78)
ANION GAP SERPL CALC-SCNC: 4 MMOL/L — LOW (ref 5–17)
APTT BLD: 34.3 SEC — SIGNIFICANT CHANGE UP (ref 27.5–35.5)
AST SERPL-CCNC: 39 U/L — HIGH (ref 15–37)
BASOPHILS # BLD AUTO: 0.01 K/UL — SIGNIFICANT CHANGE UP (ref 0–0.2)
BASOPHILS NFR BLD AUTO: 0.2 % — SIGNIFICANT CHANGE UP (ref 0–2)
BILIRUB SERPL-MCNC: 0.7 MG/DL — SIGNIFICANT CHANGE UP (ref 0.2–1.2)
BUN SERPL-MCNC: 15 MG/DL — SIGNIFICANT CHANGE UP (ref 7–23)
CALCIUM SERPL-MCNC: 8.9 MG/DL — SIGNIFICANT CHANGE UP (ref 8.5–10.1)
CHLORIDE SERPL-SCNC: 108 MMOL/L — SIGNIFICANT CHANGE UP (ref 96–108)
CO2 SERPL-SCNC: 26 MMOL/L — SIGNIFICANT CHANGE UP (ref 22–31)
CREAT SERPL-MCNC: 1.08 MG/DL — SIGNIFICANT CHANGE UP (ref 0.5–1.3)
D DIMER BLD IA.RAPID-MCNC: <150 NG/ML DDU — SIGNIFICANT CHANGE UP
EOSINOPHIL # BLD AUTO: 0 K/UL — SIGNIFICANT CHANGE UP (ref 0–0.5)
EOSINOPHIL NFR BLD AUTO: 0 % — SIGNIFICANT CHANGE UP (ref 0–6)
GLUCOSE SERPL-MCNC: 79 MG/DL — SIGNIFICANT CHANGE UP (ref 70–99)
HCT VFR BLD CALC: 43.8 % — SIGNIFICANT CHANGE UP (ref 39–50)
HGB BLD-MCNC: 14.2 G/DL — SIGNIFICANT CHANGE UP (ref 13–17)
IMM GRANULOCYTES NFR BLD AUTO: 0 % — SIGNIFICANT CHANGE UP (ref 0–1.5)
INR BLD: 1.23 RATIO — HIGH (ref 0.88–1.16)
LYMPHOCYTES # BLD AUTO: 1.67 K/UL — SIGNIFICANT CHANGE UP (ref 1–3.3)
LYMPHOCYTES # BLD AUTO: 37.6 % — SIGNIFICANT CHANGE UP (ref 13–44)
MCHC RBC-ENTMCNC: 28.6 PG — SIGNIFICANT CHANGE UP (ref 27–34)
MCHC RBC-ENTMCNC: 32.4 GM/DL — SIGNIFICANT CHANGE UP (ref 32–36)
MCV RBC AUTO: 88.1 FL — SIGNIFICANT CHANGE UP (ref 80–100)
MONOCYTES # BLD AUTO: 0.49 K/UL — SIGNIFICANT CHANGE UP (ref 0–0.9)
MONOCYTES NFR BLD AUTO: 11 % — SIGNIFICANT CHANGE UP (ref 2–14)
NEUTROPHILS # BLD AUTO: 2.27 K/UL — SIGNIFICANT CHANGE UP (ref 1.8–7.4)
NEUTROPHILS NFR BLD AUTO: 51.2 % — SIGNIFICANT CHANGE UP (ref 43–77)
PLATELET # BLD AUTO: 214 K/UL — SIGNIFICANT CHANGE UP (ref 150–400)
POTASSIUM SERPL-MCNC: 3.6 MMOL/L — SIGNIFICANT CHANGE UP (ref 3.5–5.3)
POTASSIUM SERPL-SCNC: 3.6 MMOL/L — SIGNIFICANT CHANGE UP (ref 3.5–5.3)
PROT SERPL-MCNC: 8.7 GM/DL — HIGH (ref 6–8.3)
PROTHROM AB SERPL-ACNC: 14.3 SEC — HIGH (ref 10.6–13.6)
RBC # BLD: 4.97 M/UL — SIGNIFICANT CHANGE UP (ref 4.2–5.8)
RBC # FLD: 12.3 % — SIGNIFICANT CHANGE UP (ref 10.3–14.5)
SODIUM SERPL-SCNC: 138 MMOL/L — SIGNIFICANT CHANGE UP (ref 135–145)
WBC # BLD: 4.44 K/UL — SIGNIFICANT CHANGE UP (ref 3.8–10.5)
WBC # FLD AUTO: 4.44 K/UL — SIGNIFICANT CHANGE UP (ref 3.8–10.5)

## 2021-04-27 PROCEDURE — 99285 EMERGENCY DEPT VISIT HI MDM: CPT

## 2021-04-27 PROCEDURE — 93010 ELECTROCARDIOGRAM REPORT: CPT | Mod: 76

## 2021-04-27 PROCEDURE — 71045 X-RAY EXAM CHEST 1 VIEW: CPT | Mod: 26

## 2021-04-27 RX ORDER — MORPHINE SULFATE 50 MG/1
4 CAPSULE, EXTENDED RELEASE ORAL ONCE
Refills: 0 | Status: DISCONTINUED | OUTPATIENT
Start: 2021-04-27 | End: 2021-04-27

## 2021-04-27 RX ORDER — NITROGLYCERIN 6.5 MG
0.4 CAPSULE, EXTENDED RELEASE ORAL ONCE
Refills: 0 | Status: COMPLETED | OUTPATIENT
Start: 2021-04-27 | End: 2021-04-27

## 2021-04-27 RX ADMIN — Medication 0.4 MILLIGRAM(S): at 22:57

## 2021-04-27 NOTE — ED ADULT TRIAGE NOTE - NS ED TRIAGE AVPU SCALE
53 year old F with hx of seizure d/o MR, hypothyroidism, Down syndrome, urinary retention s/p suprapubic catheter placement on 12/19/19 by IR sent in by group home for evaluation. + constipation (LBM 01/01/20, pt on daily docusate). + fatigue, decreased po intake. As per staff have also noted mild redness/discharge from suprapubic catheter site. No fever/vomiting, bloody stools, trauma/injuries. Alert-The patient is alert, awake and responds to voice. The patient is oriented to time, place, and person. The triage nurse is able to obtain subjective information.

## 2021-04-27 NOTE — ED PROVIDER NOTE - OBJECTIVE STATEMENT
32 y/o homeless M with PMHx of HIV+, CVA, GBS, asthma, and cocaine abuse presents to the ED BIBEMS c/o intermittent +chest pain beginning yesterday. States he feels as though an "elephant is sitting on [his] chest" described as heaviness. Rates pain 8/10. No SOB, cough, or fever. Was given ASA by EMS PTA. Allergic to Ceclor and Toradol.

## 2021-04-27 NOTE — ED PROVIDER NOTE - PMH
Asthma    Chronic sinusitis    Closed fracture of multiple ribs of right side, initial encounter    Cocaine abuse    CVA (cerebral vascular accident)    GBS (Guillain [place] syndrome)    Guillain-Holton    HIV (human immunodeficiency virus infection)  from birth  HIV (human immunodeficiency virus infection)    HIV disease    Homeless

## 2021-04-27 NOTE — ED ADULT NURSE NOTE - OBJECTIVE STATEMENT
patient axox3, c/o intermittent chest pain since yesterday. +intermittent SOB. patient denies SOB at this time. patient states "it feels like an elephant is sitting on my chest". patient denies headache, vision changes, n/v/d, fever, chills, cough. patient was given 4 baby ASA PTA. hx guillain-barre, HIV.

## 2021-04-27 NOTE — ED ADULT NURSE NOTE - PMH
Asthma    Chronic sinusitis    Closed fracture of multiple ribs of right side, initial encounter    Cocaine abuse    CVA (cerebral vascular accident)    GBS (Guillain [place] syndrome)    Guillain-[place]    HIV (human immunodeficiency virus infection)  from birth  HIV (human immunodeficiency virus infection)    HIV disease    Homeless

## 2021-04-27 NOTE — ED PROVIDER NOTE - CLINICAL SUMMARY MEDICAL DECISION MAKING FREE TEXT BOX
Pt c/o intractable chest pressure.  EKG WNL, only single T wave inversion in lead III on repeat EKG.  Not improved s/p ASA or nitroglycerin, will give dose of morphine.  Hx of cocaine abuse, denies currently.  Labs and XR normal.  Given risk factors, no good follow up, and continued chest pressure, will admit to tele for cardiology evaluation.

## 2021-04-27 NOTE — ED ADULT TRIAGE NOTE - CHIEF COMPLAINT QUOTE
PT BIBA with c/o of chest pain since this afternoon, states in the past hour he has felt like an elephant is on his chest.  Diagnosed with Guillain [place] Syndrome in 2018, born HIV +.

## 2021-04-27 NOTE — ED ADULT NURSE NOTE - CHIEF COMPLAINT QUOTE
PT BIBA with c/o of chest pain since this afternoon, states in the past hour he has felt like an elephant is on his chest.  Diagnosed with Guillain [place] Syndrome in 2018, born HIV +.

## 2021-04-28 DIAGNOSIS — R07.9 CHEST PAIN, UNSPECIFIED: ICD-10-CM

## 2021-04-28 DIAGNOSIS — I20.0 UNSTABLE ANGINA: ICD-10-CM

## 2021-04-28 LAB
A1C WITH ESTIMATED AVERAGE GLUCOSE RESULT: 5.1 % — SIGNIFICANT CHANGE UP (ref 4–5.6)
ADD ON TEST-SPECIMEN IN LAB: SIGNIFICANT CHANGE UP
CHOLEST SERPL-MCNC: 137 MG/DL — SIGNIFICANT CHANGE UP
COVID-19 SPIKE DOMAIN AB INTERP: POSITIVE
COVID-19 SPIKE DOMAIN ANTIBODY RESULT: 156 U/ML — HIGH
ESTIMATED AVERAGE GLUCOSE: 100 MG/DL — SIGNIFICANT CHANGE UP (ref 68–114)
HDLC SERPL-MCNC: 30 MG/DL — LOW
LIPID PNL WITH DIRECT LDL SERPL: 92 MG/DL — SIGNIFICANT CHANGE UP
NON HDL CHOLESTEROL: 108 MG/DL — SIGNIFICANT CHANGE UP
PCP SPEC-MCNC: SIGNIFICANT CHANGE UP
RAPID RVP RESULT: SIGNIFICANT CHANGE UP
SARS-COV-2 IGG+IGM SERPL QL IA: 156 U/ML — HIGH
SARS-COV-2 IGG+IGM SERPL QL IA: POSITIVE
SARS-COV-2 RNA SPEC QL NAA+PROBE: SIGNIFICANT CHANGE UP
TRIGL SERPL-MCNC: 77 MG/DL — SIGNIFICANT CHANGE UP
TROPONIN I SERPL-MCNC: <0.015 NG/ML — SIGNIFICANT CHANGE UP (ref 0.01–0.04)
TROPONIN I SERPL-MCNC: <0.015 NG/ML — SIGNIFICANT CHANGE UP (ref 0.01–0.04)
TSH SERPL-MCNC: 0.77 UU/ML — SIGNIFICANT CHANGE UP (ref 0.34–4.82)

## 2021-04-28 PROCEDURE — 93010 ELECTROCARDIOGRAM REPORT: CPT

## 2021-04-28 PROCEDURE — 36415 COLL VENOUS BLD VENIPUNCTURE: CPT

## 2021-04-28 PROCEDURE — 84484 ASSAY OF TROPONIN QUANT: CPT

## 2021-04-28 PROCEDURE — 93005 ELECTROCARDIOGRAM TRACING: CPT

## 2021-04-28 PROCEDURE — 83036 HEMOGLOBIN GLYCOSYLATED A1C: CPT

## 2021-04-28 PROCEDURE — 84443 ASSAY THYROID STIM HORMONE: CPT

## 2021-04-28 PROCEDURE — 99223 1ST HOSP IP/OBS HIGH 75: CPT

## 2021-04-28 PROCEDURE — 80307 DRUG TEST PRSMV CHEM ANLYZR: CPT

## 2021-04-28 PROCEDURE — 78452 HT MUSCLE IMAGE SPECT MULT: CPT

## 2021-04-28 PROCEDURE — 93017 CV STRESS TEST TRACING ONLY: CPT

## 2021-04-28 PROCEDURE — 80061 LIPID PANEL: CPT

## 2021-04-28 PROCEDURE — 93306 TTE W/DOPPLER COMPLETE: CPT

## 2021-04-28 PROCEDURE — A9500: CPT

## 2021-04-28 PROCEDURE — 93306 TTE W/DOPPLER COMPLETE: CPT | Mod: 26

## 2021-04-28 RX ORDER — OXYCODONE AND ACETAMINOPHEN 5; 325 MG/1; MG/1
1 TABLET ORAL EVERY 6 HOURS
Refills: 0 | Status: DISCONTINUED | OUTPATIENT
Start: 2021-04-28 | End: 2021-04-29

## 2021-04-28 RX ORDER — MORPHINE SULFATE 50 MG/1
1 CAPSULE, EXTENDED RELEASE ORAL ONCE
Refills: 0 | Status: DISCONTINUED | OUTPATIENT
Start: 2021-04-28 | End: 2021-04-28

## 2021-04-28 RX ORDER — CALCIUM CARBONATE 500(1250)
3 TABLET ORAL EVERY 6 HOURS
Refills: 0 | Status: DISCONTINUED | OUTPATIENT
Start: 2021-04-28 | End: 2021-04-29

## 2021-04-28 RX ORDER — BICTEGRAVIR SODIUM, EMTRICITABINE, AND TENOFOVIR ALAFENAMIDE FUMARATE 30; 120; 15 MG/1; MG/1; MG/1
1 TABLET ORAL DAILY
Refills: 0 | Status: DISCONTINUED | OUTPATIENT
Start: 2021-04-28 | End: 2021-04-29

## 2021-04-28 RX ORDER — ONDANSETRON 8 MG/1
4 TABLET, FILM COATED ORAL EVERY 6 HOURS
Refills: 0 | Status: DISCONTINUED | OUTPATIENT
Start: 2021-04-28 | End: 2021-04-29

## 2021-04-28 RX ORDER — QUETIAPINE FUMARATE 200 MG/1
200 TABLET, FILM COATED ORAL AT BEDTIME
Refills: 0 | Status: DISCONTINUED | OUTPATIENT
Start: 2021-04-28 | End: 2021-04-29

## 2021-04-28 RX ORDER — NICOTINE POLACRILEX 2 MG
1 GUM BUCCAL DAILY
Refills: 0 | Status: DISCONTINUED | OUTPATIENT
Start: 2021-04-28 | End: 2021-04-29

## 2021-04-28 RX ORDER — ASPIRIN/CALCIUM CARB/MAGNESIUM 324 MG
81 TABLET ORAL DAILY
Refills: 0 | Status: DISCONTINUED | OUTPATIENT
Start: 2021-04-28 | End: 2021-04-29

## 2021-04-28 RX ORDER — FAMOTIDINE 10 MG/ML
20 INJECTION INTRAVENOUS
Refills: 0 | Status: DISCONTINUED | OUTPATIENT
Start: 2021-04-28 | End: 2021-04-29

## 2021-04-28 RX ORDER — NITROGLYCERIN 6.5 MG
0.4 CAPSULE, EXTENDED RELEASE ORAL
Refills: 0 | Status: DISCONTINUED | OUTPATIENT
Start: 2021-04-28 | End: 2021-04-29

## 2021-04-28 RX ADMIN — FAMOTIDINE 20 MILLIGRAM(S): 10 INJECTION INTRAVENOUS at 21:29

## 2021-04-28 RX ADMIN — Medication 1 PATCH: at 21:29

## 2021-04-28 RX ADMIN — QUETIAPINE FUMARATE 200 MILLIGRAM(S): 200 TABLET, FILM COATED ORAL at 21:33

## 2021-04-28 RX ADMIN — BICTEGRAVIR SODIUM, EMTRICITABINE, AND TENOFOVIR ALAFENAMIDE FUMARATE 1 TABLET(S): 30; 120; 15 TABLET ORAL at 11:12

## 2021-04-28 RX ADMIN — MORPHINE SULFATE 1 MILLIGRAM(S): 50 CAPSULE, EXTENDED RELEASE ORAL at 04:44

## 2021-04-28 RX ADMIN — MORPHINE SULFATE 4 MILLIGRAM(S): 50 CAPSULE, EXTENDED RELEASE ORAL at 00:06

## 2021-04-28 RX ADMIN — Medication 1 PATCH: at 11:13

## 2021-04-28 RX ADMIN — OXYCODONE AND ACETAMINOPHEN 1 TABLET(S): 5; 325 TABLET ORAL at 11:14

## 2021-04-28 RX ADMIN — MORPHINE SULFATE 1 MILLIGRAM(S): 50 CAPSULE, EXTENDED RELEASE ORAL at 04:14

## 2021-04-28 RX ADMIN — FAMOTIDINE 20 MILLIGRAM(S): 10 INJECTION INTRAVENOUS at 11:15

## 2021-04-28 RX ADMIN — Medication 81 MILLIGRAM(S): at 11:15

## 2021-04-28 RX ADMIN — Medication 0.4 MILLIGRAM(S): at 15:12

## 2021-04-28 NOTE — H&P ADULT - NSHPPHYSICALEXAM_GEN_ALL_CORE
PHYSICAL EXAM:    T(C): 36.5 (04-28-21 @ 08:02), Max: 36.5 (04-28-21 @ 03:55)  HR: 62 (04-28-21 @ 08:02) (62 - 99)  BP: 126/78 (04-28-21 @ 08:02) (119/68 - 141/79)  RR: 18 (04-28-21 @ 08:02) (16 - 18)  SpO2: 99% (04-28-21 @ 08:02) (96% - 100%)    General: AAOx3; NAD  Head: AT/NC  ENT: Moist Mucous Membranes; No Injury  Neck: Non-tender; No JVD  CVS: RRR, S1&S2, No murmur, No edema  Respiratory: Lungs CTA B/L; Normal Respiratory Effort  Abdomen/GI: Soft, non-tender, non-distended, no guarding, no rebound, normal bowel sounds  : No bladder distention, No Brizuela  Extremites: No cyanosis, No clubbing, No edema  MSK: No CVA tenderness, Normal ROM, No injury  Neuro: AAOx3, CNII-XII grossly intact, non-focal  Psych: Appropriate, Cooperative, No depression, No anxiety  Skin: Clean, Dry and Intact

## 2021-04-28 NOTE — CONSULT NOTE ADULT - SUBJECTIVE AND OBJECTIVE BOX
PCP:    REQUESTING PHYSICIAN:    REASON FOR CONSULT:    CHIEF COMPLAINT:    HPI:  31M with PMH of HIV, Cocaine Abuse, tobacco use disorder, Guillain [place] Syndrom (reportedly after Flu vaccine 2018) presents with generalized fatigue and malaise x 3-4 days with onset of chest pain x 2 day. Intermittent substernal 10/10 pressure pain with radiation to the back. Not associated with exertion, position or ingesting of food or liquids. Maybe some mild pleurisy. Denies fever, chills, cough, nausea, vomiting, abdominal pain, weight loss/gain, edema.    In the ED vitals stable. Troponin negative x 3, dimer negative and cxray unremarkable. Cardiology called to evaluate symptoms of chest pain. Pt was given SL NTG without relief. Pt denies history of MI or CHF. Pt reports that his pain has occurred daily for the past 3 days and is pressure-like. He has had the pain before intermittently.     (28 Apr 2021 10:05)      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    Guillain-[place]    Asthma    CVA (cerebral vascular accident)    Closed fracture of multiple ribs of right side, initial encounter    Cocaine abuse    Chronic sinusitis    Homeless    HIV disease    HIV (human immunodeficiency virus infection)    GBS (Guillain [place] syndrome)    History of orthopedic surgery  left arm        Allergies    Ceclor (Unknown)  Toradol (Anaphylaxis; Swelling)    Intolerances        SOCIAL HISTORY:    FAMILY HISTORY:  Family history unknown  No aware of any significant family medical history          MEDICATIONS:  MEDICATIONS  (STANDING):  aspirin enteric coated 81 milliGRAM(s) Oral daily  bictegravir 50 mG/emtricitabine 200 mG/tenofovir alafenamide 25 mG (BIKTARVY) 1 Tablet(s) Oral daily  famotidine    Tablet 20 milliGRAM(s) Oral two times a day  nicotine -   7 mG/24Hr(s) Patch 1 Patch Transdermal daily  QUEtiapine 200 milliGRAM(s) Oral at bedtime    MEDICATIONS  (PRN):  calcium carbonate    500 mG (Tums) Chewable 3 Tablet(s) Chew every 6 hours PRN Dyspepsia  nitroglycerin     SubLingual 0.4 milliGRAM(s) SubLingual every 5 minutes PRN Chest Pain  ondansetron Injectable 4 milliGRAM(s) IV Push every 6 hours PRN Nausea  oxycodone    5 mG/acetaminophen 325 mG 1 Tablet(s) Oral every 6 hours PRN Severe Pain (7 - 10)      REVIEW OF SYSTEMS:    CONSTITUTIONAL: No weakness, fevers or chills  EYES/ENT: No visual changes;  No vertigo or throat pain   NECK: No pain or stiffness  RESPIRATORY: No cough, wheezing, hemoptysis; No shortness of breath  CARDIOVASCULAR: Chest pain  GASTROINTESTINAL: No abdominal or epigastric pain. No nausea, vomiting, or hematemesis; No diarrhea or constipation. No melena or hematochezia.  GENITOURINARY: No dysuria, frequency or hematuria  NEUROLOGICAL: GB  SKIN: No itching, burning, rashes, or lesions   All other review of systems is negative unless indicated above    Vital Signs Last 24 Hrs  T(C): 36.5 (28 Apr 2021 08:02), Max: 36.5 (28 Apr 2021 03:55)  T(F): 97.7 (28 Apr 2021 08:02), Max: 97.7 (28 Apr 2021 03:55)  HR: 81 (28 Apr 2021 15:11) (62 - 99)  BP: 121/77 (28 Apr 2021 15:11) (119/68 - 141/79)  BP(mean): 95 (28 Apr 2021 03:55) (89 - 97)  RR: 18 (28 Apr 2021 08:02) (16 - 18)  SpO2: 99% (28 Apr 2021 08:02) (96% - 100%)    I&O's Summary      PHYSICAL EXAM:    Constitutional: NAD, awake and alert, well-developed  HEENT: PERR, EOMI,  No oral cyananosis.  Neck:  supple,  No JVD  Respiratory: Breath sounds are clear bilaterally, No wheezing, rales or rhonchi  Cardiovascular: S1 and S2, regular rate and rhythm, no Murmurs, gallops or rubs  Gastrointestinal: Bowel Sounds present, soft, nontender.   Extremities: No peripheral edema. No clubbing or cyanosis.  Vascular: 2+ peripheral pulses  Neurological: A/O x 3, no focal deficits  Musculoskeletal: no calf tenderness.  Skin: No rashes.      LABS: All Labs Reviewed:                        14.2   4.44  )-----------( 214      ( 27 Apr 2021 22:51 )             43.8     27 Apr 2021 22:51    138    |  108    |  15     ----------------------------<  79     3.6     |  26     |  1.08     Ca    8.9        27 Apr 2021 22:51    TPro  8.7    /  Alb  4.1    /  TBili  0.7    /  DBili  x      /  AST  39     /  ALT  30     /  AlkPhos  85     27 Apr 2021 22:51    PT/INR - ( 27 Apr 2021 22:51 )   PT: 14.3 sec;   INR: 1.23 ratio         PTT - ( 27 Apr 2021 22:51 )  PTT:34.3 sec  CARDIAC MARKERS ( 28 Apr 2021 04:11 )  <0.015 ng/mL / x     / x     / x     / x      CARDIAC MARKERS ( 28 Apr 2021 01:48 )  <0.015 ng/mL / x     / x     / x     / x      CARDIAC MARKERS ( 27 Apr 2021 22:51 )  <0.015 ng/mL / x     / x     / x     / x          Blood Culture:     04-28 @ 11:42  TSH: 0.77      RADIOLOGY/EKG: NSR with acute changes    < from: NM Nuclear Stress Pharmacologic Multiple (10.14.20 @ 12:00) >  FINDINGS: The patient was injected with the radiotracer at rest but refused imaging.    IMPRESSION: Incomplete study.            CHEKO YORK M.D., ATTENDING RADIOLOGIST  This document has been electronically signed. Oct 14 2020 12:26PM    < end of copied text >  < from: TTE Echo Complete w/o Contrast w/ Doppler (10.12.20 @ 10:23) >   Impression     Summary     The left ventricle is normal in size, wall thickness, wall motion and   contractility.   Estimated left ventricular ejection fraction is 55 %.   Normal appearing left atrium.   Normal appearing right ventricle structure and function.   The aortic valve is trileaflet with thin pliable leaflets.   The mitral valve leaflets appear thin and normal.   Mild mitral regurgitation is present.   No evidence of pericardial effusion.     Signature     ----------------------------------------------------------------   Electronically signed by Clayton Sheffield MD(Interpreting physician)   on 10/12/2020 05:09 PM   ----------------------------------------------------------------    < end of copied text >

## 2021-04-28 NOTE — H&P ADULT - ASSESSMENT
MEDICATIONS  (STANDING):  aspirin enteric coated 81 milliGRAM(s) Oral daily  bictegravir 50 mG/emtricitabine 200 mG/tenofovir alafenamide 25 mG (BIKTARVY) 1 Tablet(s) Oral daily  famotidine    Tablet 20 milliGRAM(s) Oral two times a day  nicotine -   7 mG/24Hr(s) Patch 1 Patch Transdermal daily  QUEtiapine 200 milliGRAM(s) Oral at bedtime    MEDICATIONS  (PRN):  calcium carbonate    500 mG (Tums) Chewable 3 Tablet(s) Chew every 6 hours PRN Dyspepsia  nitroglycerin     SubLingual 0.4 milliGRAM(s) SubLingual every 5 minutes PRN Chest Pain  ondansetron Injectable 4 milliGRAM(s) IV Push every 6 hours PRN Nausea  oxycodone    5 mG/acetaminophen 325 mG 1 Tablet(s) Oral every 6 hours PRN Severe Pain (7 - 10)    ASSESSMENT    Chest pain; Typical  -Could be vasospasm from cocaine abuse  -Unlikely ACS  -Patient reports last Cocaine use 7 days. UDS 4/28 positive for THC, opiates and cocaine  HIV  GBS. Stable  Tobacco Use Disorder  Cocaine abuse  Reported personal history of CVA with no Residual Deficits?    PLAN;    Admitted to telemetry  Troponin negative x 3  CXRAY negative  ECho ordered.   Dimer negative  ASA  Lipid panel ordered  Cardiology consulted  Avoid BB in the setting of reported recent Cocaine abuse  Continue patients home medications of Bitarvy and Seroquel    DVT Prophylaxis: SCDs MEDICATIONS  (STANDING):  aspirin enteric coated 81 milliGRAM(s) Oral daily  bictegravir 50 mG/emtricitabine 200 mG/tenofovir alafenamide 25 mG (BIKTARVY) 1 Tablet(s) Oral daily  famotidine    Tablet 20 milliGRAM(s) Oral two times a day  nicotine -   7 mG/24Hr(s) Patch 1 Patch Transdermal daily  QUEtiapine 200 milliGRAM(s) Oral at bedtime    MEDICATIONS  (PRN):  calcium carbonate    500 mG (Tums) Chewable 3 Tablet(s) Chew every 6 hours PRN Dyspepsia  nitroglycerin     SubLingual 0.4 milliGRAM(s) SubLingual every 5 minutes PRN Chest Pain  ondansetron Injectable 4 milliGRAM(s) IV Push every 6 hours PRN Nausea  oxycodone    5 mG/acetaminophen 325 mG 1 Tablet(s) Oral every 6 hours PRN Severe Pain (7 - 10)    ASSESSMENT    Chest pain; Typical  -Could be vasospasm from cocaine abuse  -Unlikely ACS  -Patient reports last Cocaine use 7 days. UDS 4/28 positive for THC, opiates and cocaine  HIV  GBS. Stable  Tobacco Use Disorder  Cocaine abuse  Reported personal history of CVA with no Residual Deficits?    PLAN;    Admitted to telemetry  Troponin negative x 3  CXRAY negative  ECho ordered.   Dimer negative  ASA  Lipid panel ordered  Cardiology consulted  Avoid BB in the setting of reported recent Cocaine abuse  Continue patients home medications of Bictarvy and Seroquel    DVT Prophylaxis: SCDs

## 2021-04-28 NOTE — H&P ADULT - NSICDXPASTMEDICALHX_GEN_ALL_CORE_FT
PAST MEDICAL HISTORY:  Asthma     Chronic sinusitis     Closed fracture of multiple ribs of right side, initial encounter     Cocaine abuse     CVA (cerebral vascular accident)     GBS (Guillain [place] syndrome)     Guillain-[place]     HIV (human immunodeficiency virus infection) from birth    HIV (human immunodeficiency virus infection)     HIV disease     Homeless

## 2021-04-28 NOTE — H&P ADULT - NSHPLABSRESULTS_GEN_ALL_CORE
14.2   4.44  )-----------( 214      ( 27 Apr 2021 22:51 )             43.8     04-27    138  |  108  |  15  ----------------------------<  79  3.6   |  26  |  1.08    Ca    8.9      27 Apr 2021 22:51    TPro  8.7<H>  /  Alb  4.1  /  TBili  0.7  /  DBili  x   /  AST  39<H>  /  ALT  30  /  AlkPhos  85  04-27    SARS-CoV-2: [facility] (27 Apr 2021 22:54)  COVID-19 PCR: [facility] (11 Dec 2020 23:48)    CAPILLARY BLOOD GLUCOSE  Troponin I, Serum (04.28.21 @ 04:11)   Troponin I, Serum: <0.015: High Sensitivity Troponin and new reference range effective 7/6/2016 ng/mL   Troponin I, Serum (04.27.21 @ 22:51)   Troponin I, Serum: <0.015: High Sensitivity Troponin and new reference range effective 7/6/2016 ng/mL D-Dimer Assay, Quantitative (04.27.21 @ 22:51)   D-Dimer Assay, Quantitative: <150 ng/mL DDU < from: Xray Chest 1 View- PORTABLE-Urgent (Xray Chest 1 View- PORTABLE-Urgent .) (04.27.21 @ 23:12) >    IMPRESSION: Clear lungs, unchanged.    < end of copied text >    I reviewed labs, imaging, EKG, vitals and orders

## 2021-04-28 NOTE — CONSULT NOTE ADULT - PROBLEM SELECTOR RECOMMENDATION 9
Past records evaluated. ECG wnl. troponin negative. ETT ordered in 10/20 was incomplete. Doubt cardiology etiology. Propose repeat ETT if patient consents. Continue to monitor for 24 hours.

## 2021-04-28 NOTE — PROVIDER CONTACT NOTE (OTHER) - SITUATION
Spoke with Pritesh
Pt on bridge orders, admitted with cp. Received nitro and morphine in ED, but feels no relief.

## 2021-04-28 NOTE — H&P ADULT - HISTORY OF PRESENT ILLNESS
31M with PMH of HIV, Cocaine Abuse, tobacco use disorder, Guillain [place] Syndrom (reportedly after Flu vaccine 2018) presents with generalized fatigue and malaise x 3-4 days with onset of chest pain x 2 day. Intermittent substernal 10/10 pressure pain with radiation to the back. Not associated with exertion, position or ingesting of food or liquids. Maybe some mild pleurisy. Denies fever, chills, cough, nausea, vomiting, abdominal pain, weight loss/gain, edema.    In the ED vitals stable. Troponin negative x 3, dimer negative and cxray unremarkable.

## 2021-04-29 ENCOUNTER — TRANSCRIPTION ENCOUNTER (OUTPATIENT)
Age: 31
End: 2021-04-29

## 2021-04-29 VITALS
SYSTOLIC BLOOD PRESSURE: 118 MMHG | HEART RATE: 55 BPM | TEMPERATURE: 99 F | OXYGEN SATURATION: 99 % | DIASTOLIC BLOOD PRESSURE: 73 MMHG

## 2021-04-29 PROCEDURE — 93018 CV STRESS TEST I&R ONLY: CPT

## 2021-04-29 PROCEDURE — 93016 CV STRESS TEST SUPVJ ONLY: CPT

## 2021-04-29 PROCEDURE — 78452 HT MUSCLE IMAGE SPECT MULT: CPT | Mod: 26

## 2021-04-29 PROCEDURE — 99232 SBSQ HOSP IP/OBS MODERATE 35: CPT

## 2021-04-29 PROCEDURE — 99239 HOSP IP/OBS DSCHRG MGMT >30: CPT

## 2021-04-29 RX ORDER — REGADENOSON 0.08 MG/ML
0.4 INJECTION, SOLUTION INTRAVENOUS ONCE
Refills: 0 | Status: DISCONTINUED | OUTPATIENT
Start: 2021-04-29 | End: 2021-04-29

## 2021-04-29 RX ORDER — NICOTINE POLACRILEX 2 MG
1 GUM BUCCAL
Qty: 30 | Refills: 0
Start: 2021-04-29 | End: 2021-05-28

## 2021-04-29 RX ADMIN — OXYCODONE AND ACETAMINOPHEN 1 TABLET(S): 5; 325 TABLET ORAL at 10:21

## 2021-04-29 RX ADMIN — Medication 81 MILLIGRAM(S): at 10:18

## 2021-04-29 RX ADMIN — FAMOTIDINE 20 MILLIGRAM(S): 10 INJECTION INTRAVENOUS at 10:18

## 2021-04-29 RX ADMIN — BICTEGRAVIR SODIUM, EMTRICITABINE, AND TENOFOVIR ALAFENAMIDE FUMARATE 1 TABLET(S): 30; 120; 15 TABLET ORAL at 10:18

## 2021-04-29 RX ADMIN — Medication 1 PATCH: at 10:18

## 2021-04-29 RX ADMIN — Medication 1 PATCH: at 05:24

## 2021-04-29 NOTE — DISCHARGE NOTE PROVIDER - NSDCFUADDINST_GEN_ALL_CORE_FT
Recommend complete cessation of tobacco/smoking and all illegal drugs.     You mentioned you have had a stroke before. Most people that have had stroke generally are on baby aspirin and cholesterol medication. Discuss with your primary medical doctor, if you should commence these medications.

## 2021-04-29 NOTE — DISCHARGE NOTE PROVIDER - NSDCMRMEDTOKEN_GEN_ALL_CORE_FT
Biktarvy oral tablet: 1 tab(s) orally once a day  nicotine 14 mg/24 hr transdermal film, extended release: 1 patch transdermally once a day   SEROquel 200 mg oral tablet: 1 tab(s) orally once a day (at bedtime)

## 2021-04-29 NOTE — PROGRESS NOTE ADULT - SUBJECTIVE AND OBJECTIVE BOX
REASON FOR VISIT: Chest pain    HPI:  31 year old man with a history of HIV, cocaine and tobacco abuse, Guillain [place] Syndrome (reportedly after Flu vaccine 2018) admitted on 4/28/21 with complaints of fatigue / malaise and chest pain    4/29/21:  Feels better; tolerated stress test this AM.    MEDICATIONS  (STANDING):  aspirin enteric coated 81 milliGRAM(s) Oral daily  bictegravir 50 mG/emtricitabine 200 mG/tenofovir alafenamide 25 mG (BIKTARVY) 1 Tablet(s) Oral daily  famotidine    Tablet 20 milliGRAM(s) Oral two times a day  nicotine -   7 mG/24Hr(s) Patch 1 Patch Transdermal daily  QUEtiapine 200 milliGRAM(s) Oral at bedtime  regadenoson Injectable 0.4 milliGRAM(s) IV Push once    Vital Signs Last 24 Hrs  T(C): 37 (29 Apr 2021 02:15), Max: 37 (29 Apr 2021 02:15)  T(F): 98.6 (29 Apr 2021 02:15), Max: 98.6 (29 Apr 2021 02:15)  HR: 55 (29 Apr 2021 02:15) (55 - 81)  BP: 118/73 (29 Apr 2021 02:15) (118/73 - 121/77)  SpO2: 99% (29 Apr 2021 02:15) (99% - 99%)    PHYSICAL EXAM:  Constitutional: NAD, awake and alert, appears comfortable  Respiratory: Breath sounds are clear bilaterally  Cardiovascular: S1 and S2, regular rate and rhythm, no Murmur  Gastrointestinal: Bowel Sounds present, soft, nontender.   Extremities: No edema.     LABS:   CARDIAC MARKERS ( 28 Apr 2021 04:11 ) <0.015 ng/mL / x     / x     / x     / x      CARDIAC MARKERS ( 28 Apr 2021 01:48 ) <0.015 ng/mL / x     / x     / x     / x      CARDIAC MARKERS ( 27 Apr 2021 22:51 ) <0.015 ng/mL / x     / x     / x     / x                         14.2   4.44  )-----------( 214      ( 27 Apr 2021 22:51 )             43.8     138  |  108  |  15  ----------------------------<  79  3.6   |  26  |  1.08    TTE Echo Complete w/o Contrast w/ Doppler (04.28.21 @ 12:22):   Trace mitral valve regurgitation.   Mild tricuspid regurgitation.   Left ventricular wall motion is normal.  The left ventricle is normal in size. Estimated left ventricular ejection fraction is 60 %.   Normal appearing right ventricle structure and function.    NM Nuclear Stress Pharmacologic Multiple (04.29.21 @ 09:45):  Normal SPECT Myocardial Perfusion Imaging.  No scan evidence of reversible or fixed perfusion defects.  Normal left ventricular contractility with an ejection fraction of 67% (Normal: 50% or greater).  No regional wall motion abnormalities.    12 Lead ECG (04.28.21 @ 04:16): Normal sinus rhythm

## 2021-04-29 NOTE — DISCHARGE NOTE PROVIDER - PROVIDER TOKENS
FREE:[LAST:[Primary Medical Doctor],PHONE:[(   )    -],FAX:[(   )    -],FOLLOWUP:[1 week]],PROVIDER:[TOKEN:[[id number]],FOLLOWUP:[1 week],ESTABLISHEDPATIENT:[T]]

## 2021-04-29 NOTE — DISCHARGE NOTE PROVIDER - NSDCCPCAREPLAN_GEN_ALL_CORE_FT
PRINCIPAL DISCHARGE DIAGNOSIS  Diagnosis: Chest pain  Assessment and Plan of Treatment: ACS ruled out. Could be MSK related.      SECONDARY DISCHARGE DIAGNOSES  Diagnosis: Tobacco use disorder  Assessment and Plan of Treatment: Counseled patient on tobacco/Smoking cessation    Diagnosis: Polysubstance abuse  Assessment and Plan of Treatment: Mainly cocaine as per patient  Counseled patient on cessation of all illicit drugs    Diagnosis: H/O Guillain-[place] syndrome  Assessment and Plan of Treatment: Stable

## 2021-04-29 NOTE — DISCHARGE NOTE PROVIDER - HOSPITAL COURSE
FROM H&P:    "31M with PMH of HIV, Cocaine Abuse, tobacco use disorder, Guillain [place] Syndrom (reportedly after Flu vaccine 2018) presents with generalized fatigue and malaise x 3-4 days with onset of chest pain x 2 day. Intermittent substernal 10/10 pressure pain with radiation to the back. Not associated with exertion, position or ingesting of food or liquids. Maybe some mild pleurisy. Denies fever, chills, cough, nausea, vomiting, abdominal pain, weight loss/gain, edema.    In the ED vitals stable. Troponin negative x 3, dimer negative and cxray unremarkable. "    HOSPITAL COURSE:    Admitted. No events on telemetry. Troponin negative x 3. Echo with Normal EF 60% with Mild TR. Cardiology consulted. Underwent stress test which was negative for inducible ischemia. CXRAY negative. Patient tolerating diet and ambulating. Patient discharged home in stable condition and close outpatient follow up.     T(C): 37 (04-29-21 @ 02:15), Max: 37 (04-29-21 @ 02:15)  HR: 55 (04-29-21 @ 02:15) (55 - 81)  BP: 118/73 (04-29-21 @ 02:15) (118/73 - 121/77)  RR: --  SpO2: 99% (04-29-21 @ 02:15) (99% - 99%)  `General: AAOx3; NAD  Head: AT/NC  ENT: Moist Mucous Membranes; No Injury  Neck: Non-tender; No JVD  CVS: RRR, S1&S2, No murmur, No edema  Respiratory: Lungs CTA B/L; Normal Respiratory Effort  Abdomen/GI: Soft, non-tender, non-distended, no guarding, no rebound, normal bowel sounds  : No bladder distention, No Brizuela  Extremites: No cyanosis, No clubbing, No edema  MSK: No CVA tenderness, Normal ROM, No injury  Neuro: AAOx3, CNII-XII grossly intact, non-focal  Psych: Appropriate, Cooperative, No depression, No anxiety  Skin: Clean, Dry and Intact  Discharge Management 36 minutes                          14.2   4.44  )-----------( 214      ( 27 Apr 2021 22:51 )             43.8     04-27    138  |  108  |  15  ----------------------------<  79  3.6   |  26  |  1.08    Ca    8.9      27 Apr 2021 22:51    TPro  8.7<H>  /  Alb  4.1  /  TBili  0.7  /  DBili  x   /  AST  39<H>  /  ALT  30  /  AlkPhos  85  04-27    SARS-CoV-2: [facility] (27 Apr 2021 22:54)  COVID-19 PCR: [facility] (11 Dec 2020 23:48)    CAPILLARY BLOOD GLUCOSE  A1C with Estimated Average Glucose Result: 5.1: Lipid Profile (04.28.21 @ 11:42)   Cholesterol, Serum: 137 mg/dL   Triglycerides, Serum: 77 mg/dL   HDL Cholesterol, Serum: 30 mg/dL   Non HDL Cholesterol: 108:Troponin I, Serum (04.28.21 @ 04:11)   Troponin I, Serum: <0.015: High Sensitivity Troponin and new reference range effective 7/6/2016 ng/mL       Historical Values  Troponin I, Serum (04.28.21 @ 04:11)   Troponin I, Serum: <0.015: High Sensitivity Troponin and new reference range effective 7/6/2016 ng/mL   Troponin I, Serum (04.27.21 @ 22:51)COVID-19 [place] Antibody (04.27.21 @ 22:52)   COVID-19 [place] Antibody Result: 156.00: < from: NM Nuclear Stress Pharmacologic Multiple (04.29.21 @ 09:45) >      IMPRESSION: Normal SPECT Myocardial Perfusion Imaging.    No scan evidence of reversible or fixed perfusion defects    < end of copied text >    < from: TTE Echo Complete w/o Contrast w/ Doppler (04.28.21 @ 12:22) >    The mitral valve leaflets are well seen thin and pliable; preserved   leaflet excursion noted. Trace mitral valve regurgitation noted.   The aortic valve leaflets are well seen with normal valve morphology;   preserved leaflet excursion noted. No aortic valve insufficiency noted.   The tricuspid valve leaflets are well seen and appear thin and pliable   with preserved leaflets excursion. Mild tricuspid regurgitation noted.   Normal appearing left atrium.   Left ventricular wall motion is normal.   The left ventricle is normal in size.   Estimated left ventricular ejection fraction is 60 %.   The right atrium is normal.   Normal appearing right ventricle structure and function.    < end of copied text >

## 2021-04-29 NOTE — DISCHARGE NOTE PROVIDER - CARE PROVIDER_API CALL
Primary Medical Doctor,   Phone: (   )    -  Fax: (   )    -  Follow Up Time: 1 week    Emre Schwartz (MD)  Cardiovascular Disease  [address] [facility] [facility], [address] [place] [address]  Phone: [phone]  Fax: [phone]  Established Patient  Follow Up Time: 1 week

## 2021-04-29 NOTE — DISCHARGE NOTE NURSING/CASE MANAGEMENT/SOCIAL WORK - PATIENT PORTAL LINK FT
You can access the FollowMyHealth Patient Portal offered by [facility] by registering at the following website: [email or web address]. By joining [employer]’s FollowMyHealth portal, you will also be able to view your health information using other applications (apps) compatible with our system.

## 2021-04-29 NOTE — DISCHARGE NOTE NURSING/CASE MANAGEMENT/SOCIAL WORK - NSDCPEWEB_GEN_ALL_CORE
[facility] for Tobacco Control website --- [email or web address] website --- [email or web address]

## 2021-04-29 NOTE — DISCHARGE NOTE NURSING/CASE MANAGEMENT/SOCIAL WORK - NSDCPEEMAIL_GEN_ALL_CORE
[facility] for Tobacco Control email [email or web address]

## 2021-04-29 NOTE — PROGRESS NOTE ADULT - PROBLEM SELECTOR PLAN 1
Non cardiac etiology -- normal ECG, normal Echo, Normal troponin assays, normal nuclear stress test.  Substance abuse cessation.

## 2021-05-01 ENCOUNTER — INPATIENT (INPATIENT)
Facility: HOSPITAL | Age: 31
LOS: 2 days | Discharge: HOME | DRG: 816 | End: 2021-05-04
Attending: FAMILY MEDICINE | Admitting: INTERNAL MEDICINE
Payer: MEDICAID

## 2021-05-01 VITALS — HEIGHT: 70 IN | WEIGHT: 180.78 LBS

## 2021-05-01 DIAGNOSIS — Z98.890 OTHER SPECIFIED POSTPROCEDURAL STATES: Chronic | ICD-10-CM

## 2021-05-01 PROCEDURE — 99291 CRITICAL CARE FIRST HOUR: CPT

## 2021-05-01 PROCEDURE — 70450 CT HEAD/BRAIN W/O DYE: CPT | Mod: 26

## 2021-05-01 NOTE — ED ADULT TRIAGE NOTE - CHIEF COMPLAINT QUOTE
patient BIBEMS from home c/o left-sided upper extremity numbness x1 hour PTA, headache and chest pain. hx guillian-barre, CVA in 2014. BEFAST negative in triage. code stroke called by dr. lion at [id number]. patient sent directly to CT scan. 324 ASA given by EMS PTA.

## 2021-05-02 ENCOUNTER — TRANSCRIPTION ENCOUNTER (OUTPATIENT)
Age: 31
End: 2021-05-02

## 2021-05-02 DIAGNOSIS — R29.898 OTHER SYMPTOMS AND SIGNS INVOLVING THE MUSCULOSKELETAL SYSTEM: ICD-10-CM

## 2021-05-02 LAB
A1C WITH ESTIMATED AVERAGE GLUCOSE RESULT: 5 % — SIGNIFICANT CHANGE UP (ref 4–5.6)
ADD ON TEST-SPECIMEN IN LAB: SIGNIFICANT CHANGE UP
ALBUMIN SERPL ELPH-MCNC: 3.6 G/DL — SIGNIFICANT CHANGE UP (ref 3.3–5)
ALBUMIN SERPL ELPH-MCNC: 3.8 G/DL — SIGNIFICANT CHANGE UP (ref 3.3–5)
ALP SERPL-CCNC: 69 U/L — SIGNIFICANT CHANGE UP (ref 40–120)
ALP SERPL-CCNC: 71 U/L — SIGNIFICANT CHANGE UP (ref 40–120)
ALT FLD-CCNC: 28 U/L — SIGNIFICANT CHANGE UP (ref 12–78)
ALT FLD-CCNC: 34 U/L — SIGNIFICANT CHANGE UP (ref 12–78)
ANION GAP SERPL CALC-SCNC: 6 MMOL/L — SIGNIFICANT CHANGE UP (ref 5–17)
ANION GAP SERPL CALC-SCNC: 7 MMOL/L — SIGNIFICANT CHANGE UP (ref 5–17)
APTT BLD: 35.3 SEC — SIGNIFICANT CHANGE UP (ref 27.5–35.5)
AST SERPL-CCNC: 35 U/L — SIGNIFICANT CHANGE UP (ref 15–37)
AST SERPL-CCNC: 41 U/L — HIGH (ref 15–37)
BASE EXCESS BLDV CALC-SCNC: -0.4 MMOL/L — SIGNIFICANT CHANGE UP (ref -2–2)
BASOPHILS # BLD AUTO: 0.01 K/UL — SIGNIFICANT CHANGE UP (ref 0–0.2)
BASOPHILS NFR BLD AUTO: 0.1 % — SIGNIFICANT CHANGE UP (ref 0–2)
BILIRUB SERPL-MCNC: 0.8 MG/DL — SIGNIFICANT CHANGE UP (ref 0.2–1.2)
BILIRUB SERPL-MCNC: 1 MG/DL — SIGNIFICANT CHANGE UP (ref 0.2–1.2)
BUN SERPL-MCNC: 13 MG/DL — SIGNIFICANT CHANGE UP (ref 7–23)
BUN SERPL-MCNC: 16 MG/DL — SIGNIFICANT CHANGE UP (ref 7–23)
CALCIUM SERPL-MCNC: 8.5 MG/DL — SIGNIFICANT CHANGE UP (ref 8.5–10.1)
CALCIUM SERPL-MCNC: 8.8 MG/DL — SIGNIFICANT CHANGE UP (ref 8.5–10.1)
CHLORIDE SERPL-SCNC: 108 MMOL/L — SIGNIFICANT CHANGE UP (ref 96–108)
CHLORIDE SERPL-SCNC: 109 MMOL/L — HIGH (ref 96–108)
CHOLEST SERPL-MCNC: 128 MG/DL — SIGNIFICANT CHANGE UP
CK SERPL-CCNC: 470 U/L — HIGH (ref 26–308)
CO2 SERPL-SCNC: 25 MMOL/L — SIGNIFICANT CHANGE UP (ref 22–31)
CO2 SERPL-SCNC: 26 MMOL/L — SIGNIFICANT CHANGE UP (ref 22–31)
CREAT SERPL-MCNC: 0.88 MG/DL — SIGNIFICANT CHANGE UP (ref 0.5–1.3)
CREAT SERPL-MCNC: 0.96 MG/DL — SIGNIFICANT CHANGE UP (ref 0.5–1.3)
CRP SERPL-MCNC: 25 MG/L — HIGH
EOSINOPHIL # BLD AUTO: 0.01 K/UL — SIGNIFICANT CHANGE UP (ref 0–0.5)
EOSINOPHIL NFR BLD AUTO: 0.1 % — SIGNIFICANT CHANGE UP (ref 0–6)
ERYTHROCYTE [SEDIMENTATION RATE] IN BLOOD: 28 MM/HR — HIGH (ref 0–15)
ESTIMATED AVERAGE GLUCOSE: 97 MG/DL — SIGNIFICANT CHANGE UP (ref 68–114)
GLUCOSE SERPL-MCNC: 81 MG/DL — SIGNIFICANT CHANGE UP (ref 70–99)
GLUCOSE SERPL-MCNC: 87 MG/DL — SIGNIFICANT CHANGE UP (ref 70–99)
HCO3 BLDV-SCNC: 24 MMOL/L — SIGNIFICANT CHANGE UP (ref 21–29)
HCT VFR BLD CALC: 39.3 % — SIGNIFICANT CHANGE UP (ref 39–50)
HCT VFR BLD CALC: 41.6 % — SIGNIFICANT CHANGE UP (ref 39–50)
HDLC SERPL-MCNC: 35 MG/DL — LOW
HGB BLD-MCNC: 12.5 G/DL — LOW (ref 13–17)
HGB BLD-MCNC: 13.6 G/DL — SIGNIFICANT CHANGE UP (ref 13–17)
IMM GRANULOCYTES NFR BLD AUTO: 0.1 % — SIGNIFICANT CHANGE UP (ref 0–1.5)
INR BLD: 1.2 RATIO — HIGH (ref 0.88–1.16)
IRON SATN MFR SERPL: 13 % — LOW (ref 16–55)
IRON SATN MFR SERPL: 34 UG/DL — LOW (ref 45–165)
LACTATE SERPL-SCNC: 0.9 MMOL/L — SIGNIFICANT CHANGE UP (ref 0.7–2)
LIPID PNL WITH DIRECT LDL SERPL: 81 MG/DL — SIGNIFICANT CHANGE UP
LYMPHOCYTES # BLD AUTO: 2.3 K/UL — SIGNIFICANT CHANGE UP (ref 1–3.3)
LYMPHOCYTES # BLD AUTO: 30.6 % — SIGNIFICANT CHANGE UP (ref 13–44)
MAGNESIUM SERPL-MCNC: 1.5 MG/DL — LOW (ref 1.6–2.6)
MCHC RBC-ENTMCNC: 27.7 PG — SIGNIFICANT CHANGE UP (ref 27–34)
MCHC RBC-ENTMCNC: 28.3 PG — SIGNIFICANT CHANGE UP (ref 27–34)
MCHC RBC-ENTMCNC: 31.8 GM/DL — LOW (ref 32–36)
MCHC RBC-ENTMCNC: 32.7 GM/DL — SIGNIFICANT CHANGE UP (ref 32–36)
MCV RBC AUTO: 86.5 FL — SIGNIFICANT CHANGE UP (ref 80–100)
MCV RBC AUTO: 87.1 FL — SIGNIFICANT CHANGE UP (ref 80–100)
MONOCYTES # BLD AUTO: 0.47 K/UL — SIGNIFICANT CHANGE UP (ref 0–0.9)
MONOCYTES NFR BLD AUTO: 6.3 % — SIGNIFICANT CHANGE UP (ref 2–14)
NEUTROPHILS # BLD AUTO: 4.72 K/UL — SIGNIFICANT CHANGE UP (ref 1.8–7.4)
NEUTROPHILS NFR BLD AUTO: 62.8 % — SIGNIFICANT CHANGE UP (ref 43–77)
NON HDL CHOLESTEROL: 93 MG/DL — SIGNIFICANT CHANGE UP
PCO2 BLDV: 42 MMHG — SIGNIFICANT CHANGE UP (ref 35–50)
PCP SPEC-MCNC: SIGNIFICANT CHANGE UP
PH BLDV: 7.38 — SIGNIFICANT CHANGE UP (ref 7.35–7.45)
PHOSPHATE SERPL-MCNC: 2.9 MG/DL — SIGNIFICANT CHANGE UP (ref 2.5–4.5)
PLATELET # BLD AUTO: 203 K/UL — SIGNIFICANT CHANGE UP (ref 150–400)
PLATELET # BLD AUTO: 213 K/UL — SIGNIFICANT CHANGE UP (ref 150–400)
PO2 BLDV: 289 MMHG — HIGH (ref 25–45)
POTASSIUM SERPL-MCNC: 3.5 MMOL/L — SIGNIFICANT CHANGE UP (ref 3.5–5.3)
POTASSIUM SERPL-MCNC: 3.8 MMOL/L — SIGNIFICANT CHANGE UP (ref 3.5–5.3)
POTASSIUM SERPL-SCNC: 3.5 MMOL/L — SIGNIFICANT CHANGE UP (ref 3.5–5.3)
POTASSIUM SERPL-SCNC: 3.8 MMOL/L — SIGNIFICANT CHANGE UP (ref 3.5–5.3)
PROT SERPL-MCNC: 8 GM/DL — SIGNIFICANT CHANGE UP (ref 6–8.3)
PROT SERPL-MCNC: 8.5 GM/DL — HIGH (ref 6–8.3)
PROTHROM AB SERPL-ACNC: 13.9 SEC — HIGH (ref 10.6–13.6)
RBC # BLD: 4.51 M/UL — SIGNIFICANT CHANGE UP (ref 4.2–5.8)
RBC # BLD: 4.81 M/UL — SIGNIFICANT CHANGE UP (ref 4.2–5.8)
RBC # FLD: 12.2 % — SIGNIFICANT CHANGE UP (ref 10.3–14.5)
RBC # FLD: 12.3 % — SIGNIFICANT CHANGE UP (ref 10.3–14.5)
SAO2 % BLDV: 100 % — HIGH (ref 67–88)
SARS-COV-2 RNA SPEC QL NAA+PROBE: SIGNIFICANT CHANGE UP
SODIUM SERPL-SCNC: 140 MMOL/L — SIGNIFICANT CHANGE UP (ref 135–145)
SODIUM SERPL-SCNC: 141 MMOL/L — SIGNIFICANT CHANGE UP (ref 135–145)
TIBC SERPL-MCNC: 264 UG/DL — SIGNIFICANT CHANGE UP (ref 220–430)
TRIGL SERPL-MCNC: 61 MG/DL — SIGNIFICANT CHANGE UP
TROPONIN I SERPL-MCNC: <0.015 NG/ML — SIGNIFICANT CHANGE UP (ref 0.01–0.04)
TSH SERPL-MCNC: 1.38 UU/ML — SIGNIFICANT CHANGE UP (ref 0.34–4.82)
UIBC SERPL-MCNC: 230 UG/DL — SIGNIFICANT CHANGE UP (ref 110–370)
WBC # BLD: 5.62 K/UL — SIGNIFICANT CHANGE UP (ref 3.8–10.5)
WBC # BLD: 7.52 K/UL — SIGNIFICANT CHANGE UP (ref 3.8–10.5)
WBC # FLD AUTO: 5.62 K/UL — SIGNIFICANT CHANGE UP (ref 3.8–10.5)
WBC # FLD AUTO: 7.52 K/UL — SIGNIFICANT CHANGE UP (ref 3.8–10.5)

## 2021-05-02 PROCEDURE — 87102 FUNGUS ISOLATION CULTURE: CPT

## 2021-05-02 PROCEDURE — 87040 BLOOD CULTURE FOR BACTERIA: CPT

## 2021-05-02 PROCEDURE — 80061 LIPID PANEL: CPT

## 2021-05-02 PROCEDURE — 72156 MRI NECK SPINE W/O & W/DYE: CPT | Mod: 26

## 2021-05-02 PROCEDURE — 62270 DX LMBR SPI PNXR: CPT

## 2021-05-02 PROCEDURE — 82945 GLUCOSE OTHER FLUID: CPT

## 2021-05-02 PROCEDURE — 0042T: CPT

## 2021-05-02 PROCEDURE — 70498 CT ANGIOGRAPHY NECK: CPT | Mod: 26

## 2021-05-02 PROCEDURE — 83540 ASSAY OF IRON: CPT

## 2021-05-02 PROCEDURE — 83735 ASSAY OF MAGNESIUM: CPT

## 2021-05-02 PROCEDURE — 85652 RBC SED RATE AUTOMATED: CPT

## 2021-05-02 PROCEDURE — 85027 COMPLETE CBC AUTOMATED: CPT

## 2021-05-02 PROCEDURE — 84484 ASSAY OF TROPONIN QUANT: CPT

## 2021-05-02 PROCEDURE — 72158 MRI LUMBAR SPINE W/O & W/DYE: CPT | Mod: 26

## 2021-05-02 PROCEDURE — 87483 CNS DNA AMP PROBE TYPE 12-25: CPT

## 2021-05-02 PROCEDURE — 80053 COMPREHEN METABOLIC PANEL: CPT

## 2021-05-02 PROCEDURE — 84100 ASSAY OF PHOSPHORUS: CPT

## 2021-05-02 PROCEDURE — 87070 CULTURE OTHR SPECIMN AEROBIC: CPT

## 2021-05-02 PROCEDURE — 89051 BODY FLUID CELL COUNT: CPT

## 2021-05-02 PROCEDURE — 93010 ELECTROCARDIOGRAM REPORT: CPT

## 2021-05-02 PROCEDURE — 12345: CPT | Mod: NC,GC

## 2021-05-02 PROCEDURE — 83036 HEMOGLOBIN GLYCOSYLATED A1C: CPT

## 2021-05-02 PROCEDURE — 84157 ASSAY OF PROTEIN OTHER: CPT

## 2021-05-02 PROCEDURE — 97162 PT EVAL MOD COMPLEX 30 MIN: CPT | Mod: GP

## 2021-05-02 PROCEDURE — 87635 SARS-COV-2 COVID-19 AMP PRB: CPT

## 2021-05-02 PROCEDURE — 72157 MRI CHEST SPINE W/O & W/DYE: CPT | Mod: 26

## 2021-05-02 PROCEDURE — 82550 ASSAY OF CK (CPK): CPT

## 2021-05-02 PROCEDURE — 97116 GAIT TRAINING THERAPY: CPT | Mod: GP

## 2021-05-02 PROCEDURE — 86618 LYME DISEASE ANTIBODY: CPT

## 2021-05-02 PROCEDURE — 86140 C-REACTIVE PROTEIN: CPT

## 2021-05-02 PROCEDURE — 83605 ASSAY OF LACTIC ACID: CPT

## 2021-05-02 PROCEDURE — 36415 COLL VENOUS BLD VENIPUNCTURE: CPT

## 2021-05-02 PROCEDURE — 83550 IRON BINDING TEST: CPT

## 2021-05-02 PROCEDURE — 99223 1ST HOSP IP/OBS HIGH 75: CPT | Mod: 25

## 2021-05-02 PROCEDURE — 87536 HIV-1 QUANT&REVRSE TRNSCRPJ: CPT

## 2021-05-02 PROCEDURE — 80307 DRUG TEST PRSMV CHEM ANLYZR: CPT

## 2021-05-02 PROCEDURE — 70496 CT ANGIOGRAPHY HEAD: CPT | Mod: 26

## 2021-05-02 PROCEDURE — 84443 ASSAY THYROID STIM HORMONE: CPT

## 2021-05-02 PROCEDURE — 99223 1ST HOSP IP/OBS HIGH 75: CPT

## 2021-05-02 PROCEDURE — 86592 SYPHILIS TEST NON-TREP QUAL: CPT

## 2021-05-02 PROCEDURE — 86769 SARS-COV-2 COVID-19 ANTIBODY: CPT

## 2021-05-02 RX ORDER — SODIUM CHLORIDE 9 MG/ML
1000 INJECTION, SOLUTION INTRAVENOUS
Refills: 0 | Status: DISCONTINUED | OUTPATIENT
Start: 2021-05-02 | End: 2021-05-04

## 2021-05-02 RX ORDER — ACETAMINOPHEN 500 MG
1000 TABLET ORAL ONCE
Refills: 0 | Status: DISCONTINUED | OUTPATIENT
Start: 2021-05-02 | End: 2021-05-04

## 2021-05-02 RX ORDER — BACLOFEN 100 %
10 POWDER (GRAM) MISCELLANEOUS
Refills: 0 | Status: DISCONTINUED | OUTPATIENT
Start: 2021-05-02 | End: 2021-05-04

## 2021-05-02 RX ORDER — HEPARIN SODIUM 5000 [USP'U]/ML
5000 INJECTION INTRAVENOUS; SUBCUTANEOUS EVERY 8 HOURS
Refills: 0 | Status: DISCONTINUED | OUTPATIENT
Start: 2021-05-02 | End: 2021-05-04

## 2021-05-02 RX ORDER — MAGNESIUM OXIDE 400 MG ORAL TABLET 241.3 MG
400 TABLET ORAL
Refills: 0 | Status: COMPLETED | OUTPATIENT
Start: 2021-05-02 | End: 2021-05-02

## 2021-05-02 RX ORDER — NICOTINE POLACRILEX 2 MG
1 GUM BUCCAL DAILY
Refills: 0 | Status: DISCONTINUED | OUTPATIENT
Start: 2021-05-02 | End: 2021-05-04

## 2021-05-02 RX ORDER — BICTEGRAVIR SODIUM, EMTRICITABINE, AND TENOFOVIR ALAFENAMIDE FUMARATE 30; 120; 15 MG/1; MG/1; MG/1
1 TABLET ORAL DAILY
Refills: 0 | Status: DISCONTINUED | OUTPATIENT
Start: 2021-05-02 | End: 2021-05-04

## 2021-05-02 RX ORDER — GABAPENTIN 400 MG/1
300 CAPSULE ORAL THREE TIMES A DAY
Refills: 0 | Status: DISCONTINUED | OUTPATIENT
Start: 2021-05-02 | End: 2021-05-04

## 2021-05-02 RX ORDER — GABAPENTIN 400 MG/1
1 CAPSULE ORAL
Qty: 0 | Refills: 0 | DISCHARGE

## 2021-05-02 RX ORDER — QUETIAPINE FUMARATE 200 MG/1
200 TABLET, FILM COATED ORAL AT BEDTIME
Refills: 0 | Status: DISCONTINUED | OUTPATIENT
Start: 2021-05-02 | End: 2021-05-04

## 2021-05-02 RX ADMIN — BICTEGRAVIR SODIUM, EMTRICITABINE, AND TENOFOVIR ALAFENAMIDE FUMARATE 1 TABLET(S): 30; 120; 15 TABLET ORAL at 14:23

## 2021-05-02 RX ADMIN — SODIUM CHLORIDE 125 MILLILITER(S): 9 INJECTION, SOLUTION INTRAVENOUS at 07:11

## 2021-05-02 RX ADMIN — Medication 1 MILLIGRAM(S): at 02:31

## 2021-05-02 RX ADMIN — GABAPENTIN 300 MILLIGRAM(S): 400 CAPSULE ORAL at 21:05

## 2021-05-02 RX ADMIN — Medication 10 MILLIGRAM(S): at 10:26

## 2021-05-02 RX ADMIN — GABAPENTIN 300 MILLIGRAM(S): 400 CAPSULE ORAL at 14:23

## 2021-05-02 RX ADMIN — MAGNESIUM OXIDE 400 MG ORAL TABLET 400 MILLIGRAM(S): 241.3 TABLET ORAL at 18:19

## 2021-05-02 RX ADMIN — Medication 10 MILLIGRAM(S): at 21:02

## 2021-05-02 RX ADMIN — MAGNESIUM OXIDE 400 MG ORAL TABLET 400 MILLIGRAM(S): 241.3 TABLET ORAL at 10:26

## 2021-05-02 RX ADMIN — MAGNESIUM OXIDE 400 MG ORAL TABLET 400 MILLIGRAM(S): 241.3 TABLET ORAL at 14:24

## 2021-05-02 RX ADMIN — QUETIAPINE FUMARATE 200 MILLIGRAM(S): 200 TABLET, FILM COATED ORAL at 21:02

## 2021-05-02 NOTE — ED ADULT NURSE REASSESSMENT NOTE - NS ED NURSE REASSESS COMMENT FT1
Recvd pt from night RN in bed with cardiac monitoring in place. call bell within reach, pending admission to unit.

## 2021-05-02 NOTE — DISCHARGE NOTE NURSING/CASE MANAGEMENT/SOCIAL WORK - PATIENT PORTAL LINK FT
You can access the FollowMyHealth Patient Portal offered by [facility] by registering at the following website: [email or web address]. By joining [employer]’s FollowMyHealth portal, you will also be able to view your health information using other applications (apps) compatible with our system.

## 2021-05-02 NOTE — ED PROVIDER NOTE - CLINICAL SUMMARY MEDICAL DECISION MAKING FREE TEXT BOX
Pt with hx hiv, gb syndrome, cva c/o sudden onset, cp left arm tingling bilat leg weakness. Telestroke, mri to look for transverse myelitis.

## 2021-05-02 NOTE — ED ADULT NURSE NOTE - NSIMPLEMENTINTERV_GEN_ALL_ED
Implemented All Fall Risk Interventions:  [place] to call system. Call bell, personal items and telephone within reach. Instruct patient to call for assistance. Room bathroom lighting operational. Non-slip footwear when patient is off stretcher. Physically safe environment: no spills, clutter or unnecessary equipment. Stretcher in lowest position, wheels locked, appropriate side rails in place. Provide visual cue, wrist band, yellow gown, etc. Monitor gait and stability. Monitor for mental status changes and reorient to person, place, and time. Review medications for side effects contributing to fall risk. Reinforce activity limits and safety measures with patient and family.

## 2021-05-02 NOTE — PROCEDURE NOTE - NSPOSTCAREGUIDE_GEN_A_CORE
Verbal/written post procedure instructions were given to patient/caregiver Cartilage Graft Text: The defect edges were debeveled with a #15 scalpel blade.  Given the location of the defect, shape of the defect, the fact the defect involved a full thickness cartilage defect a cartilage graft was deemed most appropriate.  An appropriate donor site was identified, cleansed, and anesthetized. The cartilage graft was then harvested and transferred to the recipient site, oriented appropriately and then sutured into place.  The secondary defect was then repaired using a primary closure.

## 2021-05-02 NOTE — H&P ADULT - HISTORY OF PRESENT ILLNESS
30 yo AAM PMH Guillain [place], polysubstance abuse, HIV on Biktarvy, presents with LE weakness and recurrent chest pain. Follows HIV clinic in [facility], also follows at [facility]. Recently completed NST at [facility] which showed no evidence of ischemia, and readmitted to [facility] in [place] with negative CTPE on 4/30/21, left AMA on 5/1, reports he did not agree with plan of care. Notably, health reports reviewed and patient has been admitted or seen in ED at least five local hospitals, including [facility], [facility], [facility], and [facility]. He reports current chest pain, denies having used cocaine in last 24 hrs, UTOX + at [facility] on 4/30 and also HNT on 4/28. Pt sleepy at time of eval and reports HA without vision change. Denies etoh abuse, or hx of sickle cell or trait. Reports he has had HIV since birth; reportedly lives in [facility] with his parents and asked that we not contact them.  SH smokes 1/2ppd tobacco cigarettes, also smokes marijuana, and denies opiate abuse but reports percocet administration at [facility], which was not performed.  Denies ETOH, denies IVDA    HIV (human immunodeficiency virus infection)    Guillain-[place]    Asthma    Closed fracture of multiple ribs of right side hx    Cocaine abuse    Chronic sinusitis    Homeless    HIV disease    HIV (human immunodeficiency virus infection)    GBS (Guillain [place] syndrome)    History of orthopedic surgery      	  Vitals:  T(C): 36.4 (05-02-21 @ 04:49), Max: 36.6 (05-02-21 @ 00:12)  HR: 77 (05-02-21 @ 04:49) (77 - 79)  BP: 129/66 (05-02-21 @ 04:49) (129/66 - 134/88)  RR: 19 (05-02-21 @ 04:49) (18 - 19)  SpO2: 97% (05-02-21 @ 04:49) (97% - 100%)  Wt(kg): --  I&O's Summary    Height (cm): 177.8 (05-01 @ 23:45), 177.8 (04-27 @ 22:23)  Weight (kg): 82 (05-01 @ 23:45), 81.7 (04-28 @ 03:55)  BMI (kg/m2): 25.9 (05-01 @ 23:45), 25.8 (04-28 @ 03:55)    PHYSICAL EXAM:  Appearance: Comfortable. No acute distress  HEENT:  Head and neck: Atraumatic. Normocephalic.  Normal oral mucosa, PERRL, Neck is supple. No JVD, No carotid bruit.   Neurologic: A & O x 3, no focal deficits. EOMI , Cranial nerves are intact.  Lymphatic: No cervical lymphadenopathy  Cardiovascular: Normal S1 S2, No murmur, rubs/gallops. No JVD, No edema  Respiratory: Lungs clear to auscultation  Gastrointestinal:  Soft, Non-tender, + BS  Lower Extremities: No edema  Psychiatry: Patient is calm. No agitation. Mood & affect appropriate  Skin: No rashes/ ecchymoses/cyanosis/ulcers visualized on the face, hands or feet.    CURRENT MEDICATIONS:    nicotine -  14 mG/24Hr(s) Patch 1 Transdermal  acetaminophen  IVPB ..  QUEtiapine      LABS:	 	  CARDIAC MARKERS ( 02 May 2021 00:02 )  <0.015 ng/mL / x     / x     / x     / x      p-BNP 02 May 2021 00:02: x                                13.6   7.52  )-----------( 203      ( 02 May 2021 00:02 )             41.6     05-02    141  |  109<H>  |  16  ----------------------------<  87  3.8   |  25  |  0.96    Ca    8.8      02 May 2021 00:02    TPro  8.5<H>  /  Alb  3.8  /  TBili  0.8  /  DBili  x   /  AST  41<H>  /  ALT  34  /  AlkPhos  71  05-02    Lipid Profile: Date: 04-28 @ 11:42  TSH 0.77

## 2021-05-02 NOTE — H&P ADULT - ASSESSMENT
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 Follows HIV clinic in [facility], also follows at [facility]. Recently completed NST at [facility] which showed no evidence of ischemia, and readmitted to [facility] in [place] with negative CTPE on 4/30/21, left AMA on 5/1, reports he did not agree with plan of care. Notably, healthix reports reviewed and patient has been admitted or seen in ED at least five local hospitals, including [facility], [facility], [facility], and [place]. Plan at this time check CPK, repeat blood culture for recent hx of + blood culture at [facility].    Positive blood culture  Hx of Staph sciuri bacteremia in 2018 at time of GB diagnosis  Blood culture at [facility] growing Gram pos cocci in clusters as of 5/1.  Suspect contaminant, and with no infectious signs or symptoms, will repeat blood cultures and monitor, can be followed and called if positive as outpt.  No antibiotics presently.     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    Recurrent atypical L sided pleuritic cp  Noncardiac in nature vs cocaine related.   No further cardiac workup at this time, recent NST negative on 4/29 at [facility].  No statin or ASA, no bb due to cocaine hx    polysubstance abuse  COWS monitoring,     HIV  Reports viral load is 380 as of 3/2021 at [facility], outside facility, and that viral load was undetectable.  Resume biktarvy, repeat ID labs  ID eval  Monitor for fever  Follow up ID clinic at [place], and PCP [facility] in [place] Dr. Kim    Social  Denies homelessness, SW eval    VTE ppx heparin sub q q8

## 2021-05-02 NOTE — STROKE CODE NOTE - ASSESSMENT/PLAN
05/02/2021 12:25 a.m. [facility] NELSON MITCHELL MRN [id number].  31M with h/o HIV, h/o CVA, h/o GBS with residual weakness, h/o drug use.  Presented with chest pain and headahces, L arm numbness, B legs weak.  LKN 11 p.m.   mm Hg Glu 87 mg/dL.  In ED, awake alert, symmetric face BUE strong, B LE drops to stretches, paresthesia to L.  NIHSS 6 (see above).  CT NEG for hemorrhage or acute transcortical infarction.  CTA no LVO.  CTP no mismatch.  Patient able to flex and extend both kness and move both ankles, albeit weak.  Exam more consistent with spinal cord injury rather than intracranial pathology.  No tPA (not c/w ischemic stroke, imaging NEG).  No thrombectomy ( no evidence of LVO).  Discussed with Dr. Drew.  Further work-up (MRI TLS) as per ED attending / neurology service.

## 2021-05-02 NOTE — ED PROVIDER NOTE - OBJECTIVE STATEMENT
Pt is a 30 yo bm with hx Hiv, cva, Mcclain [place] syndrome, drug abuse seen here three days ago for . 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 Pt states was walking in [place] about one and a half hours ago and felt these symptoms suddenly. Almost fell. Pt brought in as code stroke. No neck pain. No problems urinationg. No fever. Nonsmoker nondrinker no drugs. No fever or sob.

## 2021-05-02 NOTE — PROGRESS NOTE ADULT - SUBJECTIVE AND OBJECTIVE BOX
HPI: 30 yo AAM PMH Guillain [place], polysubstance abuse, HIV on Biktarvy, presents with LE weakness and recurrent chest pain. Follows HIV clinic in [facility], also follows at [facility]. Recently completed NST at [facility] which showed no evidence of ischemia, and readmitted to [facility] in [place] with negative CTPE on 4/30/21, left AMA on 5/1, reports he did not agree with plan of care. Notably, health reports reviewed and patient has been admitted or seen in ED at least five local hospitals, including [facility], [facility], [facility], and [place]. He reports current chest pain, denies having used cocaine in last 24 hrs, UTOX + at [facility] on 4/30 and also HNT on 4/28. Pt sleepy at time of eval and reports HA without vision change. Denies etoh abuse, or hx of sickle cell or trait. Reports he has had HIV since birth; reportedly lives in [facility] with his parents and asked that we not contact them.  SH smokes 1/2ppd tobacco cigarettes, also smokes marijuana, and denies opiate abuse but reports percocet administration at [facility], which was not performed.  Denies ETOH, denies IVDA    SUBJECTIVE:     REVIEW OF SYSTEMS:  CONSTITUTIONAL: No weakness, fevers or chills  EYES/ENT: No visual changes;  No vertigo or throat pain   NECK: No pain or stiffness  RESPIRATORY: No cough, wheezing, hemoptysis; No shortness of breath  CARDIOVASCULAR: No chest pain or palpitations  GASTROINTESTINAL: No abdominal or epigastric pain. No nausea, vomiting, or hematemesis; No diarrhea or constipation. No melena or hematochezia.  GENITOURINARY: No dysuria, frequency or hematuria  NEUROLOGICAL: No numbness or weakness  SKIN: No itching, burning, rashes, or lesions   All other review of systems is negative unless indicated above    Vital Signs Last 24 Hrs  T(C): 36.6 (02 May 2021 08:50), Max: 36.7 (02 May 2021 08:19)  T(F): 97.8 (02 May 2021 08:50), Max: 98.1 (02 May 2021 08:19)  HR: 71 (02 May 2021 08:50) (55 - 79)  BP: 132/88 (02 May 2021 08:50) (124/61 - 134/88)  BP(mean): 93 (02 May 2021 08:19) (76 - 101)  RR: 18 (02 May 2021 08:50) (16 - 19)  SpO2: 99% (02 May 2021 08:50) (97% - 100%)    I&O's Summary    02 May 2021 07:01  -  02 May 2021 15:17  --------------------------------------------------------  IN: 0 mL / OUT: 300 mL / NET: -300 mL        CAPILLARY BLOOD GLUCOSE          PHYSICAL EXAM:  Constitutional: NAD, awake and alert, well-developed  HEENT: PERR, EOMI, Normal Hearing, MMM  Neck: Soft and supple, No LAD, No JVD  Respiratory: Breath sounds are clear bilaterally, No wheezing, rales or rhonchi  Cardiovascular: S1 and S2, regular rate and rhythm, no Murmurs, gallops or rubs  Gastrointestinal: Bowel Sounds present, soft, nontender, nondistended, no guarding, no rebound  Extremities: No peripheral edema  Vascular: 2+ peripheral pulses  Neurological: A/O x 3, no focal deficits  Musculoskeletal: 5/5 strength b/l upper and lower extremities  Skin: No rashes    MEDICATIONS:  MEDICATIONS  (STANDING):  acetaminophen  IVPB .. 1000 milliGRAM(s) IV Intermittent once  baclofen 10 milliGRAM(s) Oral two times a day  bictegravir 50 mG/emtricitabine 200 mG/tenofovir alafenamide 25 mG (BIKTARVY) 1 Tablet(s) Oral daily  gabapentin 300 milliGRAM(s) Oral three times a day  heparin   Injectable 5000 Unit(s) SubCutaneous every 8 hours  lactated ringers. 1000 milliLiter(s) (125 mL/Hr) IV Continuous <Continuous>  magnesium oxide 400 milliGRAM(s) Oral three times a day with meals  nicotine -  14 mG/24Hr(s) Patch 1 patch Transdermal daily  QUEtiapine 200 milliGRAM(s) Oral at bedtime      LABS: All Labs Reviewed:                        12.5   5.62  )-----------( 213      ( 02 May 2021 06:03 )             39.3     05-02    140  |  108  |  13  ----------------------------<  81  3.5   |  26  |  0.88    Ca    8.5      02 May 2021 06:03  Phos  2.9     05-02  Mg     1.5     05-02    TPro  8.0  /  Alb  3.6  /  TBili  1.0  /  DBili  x   /  AST  35  /  ALT  28  /  AlkPhos  69  05-02    PT/INR - ( 02 May 2021 00:02 )   PT: 13.9 sec;   INR: 1.20 ratio         PTT - ( 02 May 2021 00:02 )  PTT:35.3 sec  CARDIAC MARKERS ( 02 May 2021 06:03 )  <0.015 ng/mL / x     / 470 U/L / x     / x      CARDIAC MARKERS ( 02 May 2021 00:02 )  <0.015 ng/mL / x     / 524 U/L / x     / x          Blood Culture:     RADIOLOGY/EKG:    DVT PPX:    ADVANCED DIRECTIVE:    DISPOSITION: HPI: 31M with PMH Nini [place] (2018), polysubstance abuse, HIV on Biktarvy, presented with upper and lower extremity numbness, weakness, pain and recurrent chest pain. States CP has resolved. Reports muscle weakness/pain started yesterday acutely. Follows HIV clinic in [facility], also follows at [facility]. Recently completed NST at [facility] which showed no evidence of ischemia, and readmitted to [facility] in [place] with negative CTPE on 4/30/21, left AMA on 5/1, reports he did not agree with plan of care. Notably, health reports reviewed and patient has been admitted or seen in ED at least five local hospitals, including [facility], [facility], [facility], and [place]. He reports current chest pain, denies having used cocaine in last 24 hrs, UTOX + at [facility] on 4/30 and also HNT on 4/28. Denies etoh abuse, or hx of sickle cell or trait. Reports he has had HIV since birth; reportedly lives in [facility] with his parents and asked that we not contact them.  SH smokes 1/2ppd tobacco cigarettes, also smokes marijuana, and denies opiate abuse but reports percocet administration at [facility], which was not performed.  Denies ETOH, denies IVDA    SUBJECTIVE: Pt seen and examined at bedside. Endorses HPI as above. States weakness and pain in extremities were acute. Denies recent use of recreational drugs. Pt insist on not calling parents but states they are aware he is in the hospital. Reports he live with his parents at the back of their house in something like a guest house.   Pt was observed ambulating today in the presence of myself, nurse, and team without complaints.     REVIEW OF SYSTEMS:  CONSTITUTIONAL: No weakness, fevers or chills  EYES/ENT: No visual changes;  No vertigo or throat pain   NECK: No pain or stiffness  RESPIRATORY: No cough, wheezing, hemoptysis; No shortness of breath  CARDIOVASCULAR: No chest pain or palpitations  GASTROINTESTINAL: No abdominal or epigastric pain. No nausea, vomiting, or hematemesis; No diarrhea or constipation. No melena or hematochezia.  GENITOURINARY: No dysuria, frequency or hematuria  NEUROLOGICAL: + numbness and weakness  SKIN: No itching, burning, rashes, or lesions   All other review of systems is negative unless indicated above    Vital Signs Last 24 Hrs  T(C): 36.6 (02 May 2021 08:50), Max: 36.7 (02 May 2021 08:19)  T(F): 97.8 (02 May 2021 08:50), Max: 98.1 (02 May 2021 08:19)  HR: 71 (02 May 2021 08:50) (55 - 79)  BP: 132/88 (02 May 2021 08:50) (124/61 - 134/88)  BP(mean): 93 (02 May 2021 08:19) (76 - 101)  RR: 18 (02 May 2021 08:50) (16 - 19)  SpO2: 99% (02 May 2021 08:50) (97% - 100%)    I&O's Summary    02 May 2021 07:01  -  02 May 2021 15:17  --------------------------------------------------------  IN: 0 mL / OUT: 300 mL / NET: -300 mL        PHYSICAL EXAM:  Constitutional: NAD, awake and alert  HEENT: PERR, EOMI, Normal Hearing, MMM  Neck: Soft and supple, No LAD, No JVD  Respiratory: Breath sounds are clear bilaterally, No wheezing, rales or rhonchi  Cardiovascular: S1 and S2, regular rate and rhythm, no Murmurs, gallops or rubs  Gastrointestinal: Bowel Sounds present, soft, nontender, nondistended, no guarding, no rebound  Extremities: No peripheral edema  Vascular: 2+ peripheral pulses  Neurological: A/O x 3, no focal deficits. Able to ambulate with dragging of left foot (Pt endorses that is his baseline).  Musculoskeletal: 5/5 strength in b/l upper extremities. 3-4/5 in b/l lower extremities.   Skin: No visible rashes    MEDICATIONS:  MEDICATIONS  (STANDING):  acetaminophen  IVPB .. 1000 milliGRAM(s) IV Intermittent once  baclofen 10 milliGRAM(s) Oral two times a day  bictegravir 50 mG/emtricitabine 200 mG/tenofovir alafenamide 25 mG (BIKTARVY) 1 Tablet(s) Oral daily  gabapentin 300 milliGRAM(s) Oral three times a day  heparin   Injectable 5000 Unit(s) SubCutaneous every 8 hours  lactated ringers. 1000 milliLiter(s) (125 mL/Hr) IV Continuous <Continuous>  magnesium oxide 400 milliGRAM(s) Oral three times a day with meals  nicotine -  14 mG/24Hr(s) Patch 1 patch Transdermal daily  QUEtiapine 200 milliGRAM(s) Oral at bedtime      LABS: All Labs Reviewed:                        12.5   5.62  )-----------( 213      ( 02 May 2021 06:03 )             39.3     05-02    140  |  108  |  13  ----------------------------<  81  3.5   |  26  |  0.88    Ca    8.5      02 May 2021 06:03  Phos  2.9     05-02  Mg     1.5     05-02    TPro  8.0  /  Alb  3.6  /  TBili  1.0  /  DBili  x   /  AST  35  /  ALT  28  /  AlkPhos  69  05-02    PT/INR - ( 02 May 2021 00:02 )   PT: 13.9 sec;   INR: 1.20 ratio         PTT - ( 02 May 2021 00:02 )  PTT:35.3 sec  CARDIAC MARKERS ( 02 May 2021 06:03 )  <0.015 ng/mL / x     / 470 U/L / x     / x      CARDIAC MARKERS ( 02 May 2021 00:02 )  <0.015 ng/mL / x     / 524 U/L / x     / x           RADIOLOGY/EKG:    < from: CT Angio Neck w/ IV Cont (05.02.21 @ 00:11) >  EXAM:  CT ANGIO BRAIN (W)AW IC                          EXAM:  CT ANGIO NECK (W)AW IC                          EXAM:  CT PERFUSION W MAPS IC                            PROCEDURE DATE:  05/02/2021          INTERPRETATION:  CLINICAL INFORMATION:  Stroke Code left upper extremity numbness.      TECHNIQUE:  During IV contrast administration, serial thin section CT images of the brain were obtained for CT perfusion. The raw data was sent to rapid ischemia view for post processing. Thereafter, a second bolus of IV contrast is administered to acquire a CT angiogram of the vertebral and carotid arterial vasculature from the aortic arch to the skull vertex. Maximum intensity projection images are submitted. 140 cc of Omnipaque 350 are administered without event.      The study is correlated with a prior examination from 3/25/2020.    FINDINGS:    CT PERFUSION:    Perfusion parameters are reported as follows:    CBF < 30%: 0 mL  TMax > 6s: 0 mL  Mismatch volume: 0 mL  Mismatch ratio: None.    CT ANGIOGRAM:    The aortic arch and proximal great vessels are unremarkable. The left vertebral artery arises directly from the aortic arch. The common carotid arteries are widely patent from the origins to the bifurcations. There is no chronic disease at the common carotid artery bifurcations. The cervical internal carotid arteries are patent without stenosis based on NASCET criteria. The cervical vertebral arteries are patent from the origins to the skull base. The right vertebral artery is dominant. There is no evidence of cervical carotid or vertebral artery dissection.    The skull base and intracranial carotid arteries are patent without significant stenosis. There is minimal atherosclerotic calcification involving the supraclinoid segments. The proximal MCAs and ACAs are patent without significant stenosis. The anterior communicating artery is not well visualized. Distal ERIC and MCA branches are grossly symmetric.    The skull base and intradural vertebral arteries and basilar artery are patent without significant stenosis. The proximal PCAs are patent without significant stenosis. The posterior communicating arteries are extremely hypoplastic or absent. The superior cerebellar artery origins, a right AICA/PICA, a left AICA, and aleft PICA are identified.    There is no evidence of an intracranial arterial aneurysm or arteriovenous malformation on CTA.    Intracranial superficial and deep venous sinus system are grossly unremarkable.    The regional soft tissues of the neck are otherwise unremarkable apart from enlarged nasopharyngeal adenoids. The lung apices demonstrate mild paraseptal emphysema. The osseous structures are unremarkable.      IMPRESSION:    CT PERFUSION:  Perfusion imaging shows no evidence of a core infarct or tissue at risk.    CTA NECK:  No significant stenosis of the cervical carotid arteries based on NASCET criteria. Patent cervical vertebral arteries. No evidence of cervical carotid or vertebral artery dissection.    Enlarged nasopharyngeal adenoids.    CTA HEAD:  No high-grade stenosis or occlusion of the major proximal arterial branches.    < end of copied text >        < from: CT Brain Stroke Protocol (05.01.21 @ 23:55) >  EXAM:  CT BRAIN STROKE PROTOCOL                            PROCEDURE DATE:  05/01/2021          INTERPRETATION:  NONCONTRAST CT OF THE BRAIN DATED 5/1/2021.    CLINICAL INFORMATION:  Stroke code, left arm and hand weakness.    TECHNIQUE: Axial CT images are obtained from the cranial vertex to the skullbase without the administration of IV contrast. Images are reformatted in sagittal and coronal planes.    The study is correlated with a prior exam from 10/25/2020.    FINDINGS:    There is no acute intra-axial or extra-axial hemorrhage. There is no mass effect or shift of the midline. The basal cisterns are not effaced. There is mild cerebral volume loss with prominence of the ventricles and sulci. Gray-white matter differentiation is preserved. There is no CT evidence of an acute vascular territorial infarct.    The regional soft tissues and osseous structures are unremarkable apart from scattered parotid calcifications. The visualized paranasal sinuses and tympanic/mastoid cavities are clear apart from minimal ethmoid and maxillary sinus mucosal thickening.    IMPRESSION:    No acute intracranial hemorrhage, mass effect, or CT evidence of an acute vascular territorial infarct.    Cerebral volume loss.    < end of copied text >   HPI: 31M with PMH Nini [place] (2018), polysubstance abuse, HIV on Biktarvy, presented with upper and lower extremity numbness, weakness, pain and recurrent chest pain. States CP has resolved. Reports muscle weakness/pain started yesterday acutely. Follows HIV clinic in [facility], also follows at [facility]. Recently completed NST at [facility] which showed no evidence of ischemia, and readmitted to [facility] in [place] with negative CTPE on 4/30/21, left AMA on 5/1, reports he did not agree with plan of care. Notably, health reports reviewed and patient has been admitted or seen in ED at least five local hospitals, including [facility], [facility], [facility], and [place]. He reports current chest pain, denies having used cocaine in last 24 hrs, UTOX + at [facility] on 4/30 and also HNT on 4/28. Denies etoh abuse, or hx of sickle cell or trait. Reports he has had HIV since birth; reportedly lives in [facility] with his parents and asked that we not contact them.  SH smokes 1/2ppd tobacco cigarettes, also smokes marijuana, and denies opiate abuse but reports percocet administration at [facility], which was not performed.  Denies ETOH, denies IVDA    SUBJECTIVE: Pt seen and examined at bedside. Endorses HPI as above. States weakness and pain in extremities were acute. Denies recent use of recreational drugs. Pt insist on not calling parents but states they are aware he is in the hospital. Reports he live with his parents at the back of their house in something like a guest house.   Pt was observed ambulating today in the presence of myself, nurse, and team without complaints.     REVIEW OF SYSTEMS:  CONSTITUTIONAL: No weakness, fevers or chills  EYES/ENT: No visual changes;  No vertigo or throat pain   NECK: No pain or stiffness  RESPIRATORY: No cough, wheezing, hemoptysis; No shortness of breath  CARDIOVASCULAR: No chest pain or palpitations  GASTROINTESTINAL: No abdominal or epigastric pain. No nausea, vomiting, or hematemesis; No diarrhea or constipation. No melena or hematochezia.  GENITOURINARY: No dysuria, frequency or hematuria  NEUROLOGICAL: + numbness and weakness  SKIN: No itching, burning, rashes, or lesions   All other review of systems is negative unless indicated above    Vital Signs Last 24 Hrs  T(C): 36.6 (02 May 2021 08:50), Max: 36.7 (02 May 2021 08:19)  T(F): 97.8 (02 May 2021 08:50), Max: 98.1 (02 May 2021 08:19)  HR: 71 (02 May 2021 08:50) (55 - 79)  BP: 132/88 (02 May 2021 08:50) (124/61 - 134/88)  BP(mean): 93 (02 May 2021 08:19) (76 - 101)  RR: 18 (02 May 2021 08:50) (16 - 19)  SpO2: 99% (02 May 2021 08:50) (97% - 100%)    I&O's Summary    02 May 2021 07:01  -  02 May 2021 15:17  --------------------------------------------------------  IN: 0 mL / OUT: 300 mL / NET: -300 mL        PHYSICAL EXAM:  Constitutional: NAD, awake and alert  HEENT: PERR, EOMI, Normal Hearing, MMM  Neck: Soft and supple, No LAD, No JVD  Respiratory: Breath sounds are clear bilaterally, No wheezing, rales or rhonchi  Cardiovascular: S1 and S2, regular rate and rhythm, no Murmurs, gallops or rubs  Gastrointestinal: Bowel Sounds present, soft, nontender, nondistended, no guarding, no rebound  Extremities: No peripheral edema  Vascular: 2+ peripheral pulses  Neurological: A/O x 3, no focal deficits. Able to ambulate with dragging of left foot (Pt endorses that is his baseline).  Musculoskeletal: 5/5 strength in b/l upper extremities. 3-4/5 in b/l lower extremities.   Skin: No visible rashes    MEDICATIONS:  MEDICATIONS  (STANDING):  acetaminophen  IVPB .. 1000 milliGRAM(s) IV Intermittent once  baclofen 10 milliGRAM(s) Oral two times a day  bictegravir 50 mG/emtricitabine 200 mG/tenofovir alafenamide 25 mG (BIKTARVY) 1 Tablet(s) Oral daily  gabapentin 300 milliGRAM(s) Oral three times a day  heparin   Injectable 5000 Unit(s) SubCutaneous every 8 hours  lactated ringers. 1000 milliLiter(s) (125 mL/Hr) IV Continuous <Continuous>  magnesium oxide 400 milliGRAM(s) Oral three times a day with meals  nicotine -  14 mG/24Hr(s) Patch 1 patch Transdermal daily  QUEtiapine 200 milliGRAM(s) Oral at bedtime      LABS: All Labs Reviewed:                        12.5   5.62  )-----------( 213      ( 02 May 2021 06:03 )             39.3     05-02    140  |  108  |  13  ----------------------------<  81  3.5   |  26  |  0.88    Ca    8.5      02 May 2021 06:03  Phos  2.9     05-02  Mg     1.5     05-02    TPro  8.0  /  Alb  3.6  /  TBili  1.0  /  DBili  x   /  AST  35  /  ALT  28  /  AlkPhos  69  05-02    PT/INR - ( 02 May 2021 00:02 )   PT: 13.9 sec;   INR: 1.20 ratio           PTT - ( 02 May 2021 00:02 )  PTT:35.3 sec  CARDIAC MARKERS ( 02 May 2021 06:03 )  <0.015 ng/mL / x     / 470 U/L / x     / x      CARDIAC MARKERS ( 02 May 2021 00:02 )  <0.015 ng/mL / x     / 524 U/L / x     / x           RADIOLOGY/EKG:    < from: CT Angio Neck w/ IV Cont (05.02.21 @ 00:11) >  EXAM:  CT ANGIO BRAIN (W)AW IC                          EXAM:  CT ANGIO NECK (W)AW IC                          EXAM:  CT PERFUSION W MAPS IC                            PROCEDURE DATE:  05/02/2021          INTERPRETATION:  CLINICAL INFORMATION:  Stroke Code left upper extremity numbness.      TECHNIQUE:  During IV contrast administration, serial thin section CT images of the brain were obtained for CT perfusion. The raw data was sent to rapid ischemia view for post processing. Thereafter, a second bolus of IV contrast is administered to acquire a CT angiogram of the vertebral and carotid arterial vasculature from the aortic arch to the skull vertex. Maximum intensity projection images are submitted. 140 cc of Omnipaque 350 are administered without event.      The study is correlated with a prior examination from 3/25/2020.    FINDINGS:    CT PERFUSION:    Perfusion parameters are reported as follows:    CBF < 30%: 0 mL  TMax > 6s: 0 mL  Mismatch volume: 0 mL  Mismatch ratio: None.    CT ANGIOGRAM:    The aortic arch and proximal great vessels are unremarkable. The left vertebral artery arises directly from the aortic arch. The common carotid arteries are widely patent from the origins to the bifurcations. There is no chronic disease at the common carotid artery bifurcations. The cervical internal carotid arteries are patent without stenosis based on NASCET criteria. The cervical vertebral arteries are patent from the origins to the skull base. The right vertebral artery is dominant. There is no evidence of cervical carotid or vertebral artery dissection.    The skull base and intracranial carotid arteries are patent without significant stenosis. There is minimal atherosclerotic calcification involving the supraclinoid segments. The proximal MCAs and ACAs are patent without significant stenosis. The anterior communicating artery is not well visualized. Distal ERIC and MCA branches are grossly symmetric.    The skull base and intradural vertebral arteries and basilar artery are patent without significant stenosis. The proximal PCAs are patent without significant stenosis. The posterior communicating arteries are extremely hypoplastic or absent. The superior cerebellar artery origins, a right AICA/PICA, a left AICA, and aleft PICA are identified.    There is no evidence of an intracranial arterial aneurysm or arteriovenous malformation on CTA.    Intracranial superficial and deep venous sinus system are grossly unremarkable.    The regional soft tissues of the neck are otherwise unremarkable apart from enlarged nasopharyngeal adenoids. The lung apices demonstrate mild paraseptal emphysema. The osseous structures are unremarkable.      IMPRESSION:    CT PERFUSION:  Perfusion imaging shows no evidence of a core infarct or tissue at risk.    CTA NECK:  No significant stenosis of the cervical carotid arteries based on NASCET criteria. Patent cervical vertebral arteries. No evidence of cervical carotid or vertebral artery dissection.    Enlarged nasopharyngeal adenoids.    CTA HEAD:  No high-grade stenosis or occlusion of the major proximal arterial branches.    < end of copied text >        < from: CT Brain Stroke Protocol (05.01.21 @ 23:55) >  EXAM:  CT BRAIN STROKE PROTOCOL                            PROCEDURE DATE:  05/01/2021          INTERPRETATION:  NONCONTRAST CT OF THE BRAIN DATED 5/1/2021.    CLINICAL INFORMATION:  Stroke code, left arm and hand weakness.    TECHNIQUE: Axial CT images are obtained from the cranial vertex to the skullbase without the administration of IV contrast. Images are reformatted in sagittal and coronal planes.    The study is correlated with a prior exam from 10/25/2020.    FINDINGS:    There is no acute intra-axial or extra-axial hemorrhage. There is no mass effect or shift of the midline. The basal cisterns are not effaced. There is mild cerebral volume loss with prominence of the ventricles and sulci. Gray-white matter differentiation is preserved. There is no CT evidence of an acute vascular territorial infarct.    The regional soft tissues and osseous structures are unremarkable apart from scattered parotid calcifications. The visualized paranasal sinuses and tympanic/mastoid cavities are clear apart from minimal ethmoid and maxillary sinus mucosal thickening.    IMPRESSION:    No acute intracranial hemorrhage, mass effect, or CT evidence of an acute vascular territorial infarct.    Cerebral volume loss.    < end of copied text >

## 2021-05-02 NOTE — CONSULT NOTE ADULT - SUBJECTIVE AND OBJECTIVE BOX
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 Follows HIV clinic in [facility], also follows at [facility]. Recently completed NST at \[facility]\"" which showed no evidence of ischemia, and readmitted to [facility] in [place] with negative CTPE on 4/30/21, left AMA on 5/1, reports he did not agree with plan of care. Notably, [facility] reports reviewed and patient has been admitted or seen in ED at least five local hospitals, including [facility], [facility], [facility], and [place]. He reports current chest pain, denies having used cocaine in last 24 hrs, UTOX + at [facility] on 4/30 and also HNT on 4/28. Pt sleepy at time of eval and reports HA without vision change. Denies etoh abuse, or hx of sickle cell or trait. Reports he has had HIV since birth; reportedly lives in [place] with his parents and asked that we not contact them.  SH smokes 1/2ppd tobacco cigarettes, also smokes marijuana, and denies opiate abuse but reports percocet administration at [facility], which was not performed.  Denies ETOH, denies IVDA    5/2/21: The patient tells me that since his diagnosis of GBS in 2018 he has had residual lower extremity weakness.  However his symptoms worsened yesterday.  He reports sudden onset of chest pain and left arm numbness (which resolved after 2 hours) along with acute onset of low back pain and leg pain along with worsening of leg weakness bilaterally.  He denies shortness of breath or difficulty swallowing.  He states that leg weakness has persisted.    He denies IV drug use. He reports that his viral load is nearly undetectable but CD4 count is 380.  He is taking gabapentin and baclofen for nerve pain but is unsure of the dose.  He denies any recent fevers or viral illness.      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    Guillain-[place]    Asthma    CVA (cerebral vascular accident)    Closed fracture of multiple ribs of right side, initial encounter    Cocaine abuse    Chronic sinusitis    Homeless    HIV disease    HIV (human immunodeficiency virus infection)    GBS (Guillain [place] syndrome)    History of orthopedic surgery  left arm        FAMILY HISTORY:  Family history unknown  No aware of any significant family medical history          Social Hx: + tobacco, denies drug or alcohol use (recent utox was positive for THC, cocaine, opiates)    MEDICATIONS  (STANDING):  acetaminophen  IVPB .. 1000 milliGRAM(s) IV Intermittent once  bictegravir 50 mG/emtricitabine 200 mG/tenofovir alafenamide 25 mG (BIKTARVY) 1 Tablet(s) Oral daily  heparin   Injectable 5000 Unit(s) SubCutaneous every 8 hours  lactated ringers. 1000 milliLiter(s) (125 mL/Hr) IV Continuous <Continuous>  magnesium oxide 400 milliGRAM(s) Oral three times a day with meals  nicotine -  14 mG/24Hr(s) Patch 1 patch Transdermal daily  QUEtiapine 200 milliGRAM(s) Oral at bedtime       Allergies    Ceclor (Unknown)  Toradol (Anaphylaxis; Swelling)    Intolerances        ROS: Pertinent positives in HPI, all other ROS were reviewed and are negative.      Vital Signs Last 24 Hrs  T(C): 36.7 (02 May 2021 08:19), Max: 36.7 (02 May 2021 08:19)  T(F): 98.1 (02 May 2021 08:19), Max: 98.1 (02 May 2021 08:19)  HR: 64 (02 May 2021 08:19) (55 - 79)  BP: 129/82 (02 May 2021 08:19) (124/61 - 134/88)  BP(mean): 93 (02 May 2021 08:19) (76 - 101)  RR: 19 (02 May 2021 08:19) (16 - 19)  SpO2: 99% (02 May 2021 08:19) (97% - 100%)        Constitutional: awake and alert.  HEENT: PERRLA, EOMI,   Neck: Supple.  Respiratory: Breath sounds are clear bilaterally  Cardiovascular: S1 and S2, regular / irregular rhythm  Gastrointestinal: soft, nontender  Extremities:  no edema  Vascular: Carotid Bruit - no  Musculoskeletal: no joint swelling/tenderness, no abnormal movements  Skin: No rashes    Neurological exam:  HF: A x O x 3. Appropriately interactive, normal affect. Speech fluent, No Aphasia or paraphasic errors. Naming /repetition intact   CN: JOCELYN, EOMI, VFF, facial sensation normal, no NLFD, tongue midline, Palate moves equally, SCM equal bilaterally  Motor: No pronator drift, Strength 5/5 in b/l upper extremities. 4-/5 in b/l hip flexion (there is pain with flexion at hips) and 4/5 in knee flexion/extension, 5-/5 in dorsiflexion, plantar flexion  Sens: Intact to light touch. No sensory level  Reflexes: Symmetric and normal . BJ 2+, BR 2+, KJ 2+, AJ 2+, downgoing toes b/l  Coord:  No FNFA, dysmetria, AMAN intact   No tenderness with palpation of spine    NIHSS:          Labs:   05-02    140  |  108  |  13  ----------------------------<  81  3.5   |  26  |  0.88    Ca    8.5      02 May 2021 06:03  Phos  2.9     05-02  Mg     1.5     05-02    TPro  8.0  /  Alb  3.6  /  TBili  1.0  /  DBili  x   /  AST  35  /  ALT  28  /  AlkPhos  69  05-02    04-28 Chol 137 LDL -- HDL 30<L> Trig 77                          12.5   5.62  )-----------( 213      ( 02 May 2021 06:03 )             39.3       Radiology:  CT head/CTA head and neck/CT perfusion 5/2/21:  CT PERFUSION:  Perfusion imaging shows no evidence of a core infarct or tissue at risk.    CTA NECK:  No significant stenosis of the cervical carotid arteries based on NASCET criteria. Patent cervical vertebral arteries. No evidence of cervical carotid or vertebral artery dissection.    Enlarged nasopharyngeal adenoids.    CTA HEAD:  No high-grade stenosis or occlusion of the major proximal arterial branches.        MRI cervical/thoracic/lumbar spine with and without contrast 5/2/21:    Motion degraded exam.    Mild spondylitic changes.    No spinal cord or intraspinal abnormality identified.    Prominent nasopharyngeal soft tissue likely representing adenoidal hypertrophy. Correlation with direct visualization is advised.

## 2021-05-02 NOTE — PROGRESS NOTE ADULT - ASSESSMENT
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 Follows HIV clinic in [facility], also follows at [facility]. Recently completed NST at [facility] which showed no evidence of ischemia, and readmitted to [facility] in [place] with negative CTPE on 4/30/21, left AMA on 5/1, reports he did not agree with plan of care. Notably, [facility] reports reviewed and patient has been admitted or seen in ED at least five local hospitals, including [facility], [facility], [facility], and [place]. Plan at this time check CPK, repeat blood culture for recent hx of + blood culture at [facility].    Positive blood culture  Hx of Staph sciuri bacteremia in 2018 at time of GB diagnosis  Blood culture at [facility] growing Gram pos cocci in clusters as of 5/1.  Suspect contaminant, and with no infectious signs or symptoms, will repeat blood cultures and monitor, can be followed and called if positive as outpt.  No antibiotics presently.     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    Recurrent atypical L sided pleuritic cp  Noncardiac in nature vs cocaine related.   No further cardiac workup at this time, recent NST negative on 4/29 at [facility].  No statin or ASA, no bb due to cocaine hx    polysubstance abuse  COWS monitoring,     HIV  Reports viral load is 380 as of 3/2021 at [facility], outside facility, and that viral load was undetectable.  Resume biktarvy, repeat ID labs  ID eval  Monitor for fever  Follow up ID clinic at [place], and PCP [facility] in [place] Dr. Kim    Social  Denies homelessness, SW eval    VTE ppx heparin sub q q8 31M with PMH Guillain [place] (2018), polysubstance abuse, HIV on Biktarvy, presented with upper and lower extremity numbness, weakness, pain and recurrent chest pain. States CP has resolved. Follows HIV clinic in [facility], also follows at [facility]. Recently completed NST at [facility] which showed no evidence of ischemia, and readmitted to [facility] in [place] with negative CTPE on 4/30/21, left AMA on 5/1, reports he did not agree with plan of care. Notably, [facility] reports reviewed and patient has been admitted or seen in ED at least five local hospitals, including [facility], [facility], [facility], and [place]. Plan at this time check CPK, repeat blood culture for recent hx of + blood culture at [facility].    #Acute myopathy likely due to possibly Drug use, HIV infection, Inflammatory process including known GBS.   - CPK mildly elevated with improvement noted on IVF hydration  - CT head/neck reviewed. No acute pathology  - Gabapentin and Baclofen reinstated  - F/u Urine toxicology  - F/u ESR, CPK  - Increase activity as tolerated  - Possible LP to be done  - Monitor clinically  - Fall precautions.     #HIV infection  - Continue Biktarvy  - F/u ID consult for further recommendations  - Reports CD4 count of 380 as of 3/2021 with undetectable viral load    #Atypical Chest Pain  - Currently resolved  - Could be relative to cocaine use  - Recent cardiac w/u here at  and [facility] wnl  - Trop x 2 negative  - Monitor clinically on tele    #Positive blood culture at [facility] on 5/1  - Hx of Staph sciuri bacteremia in 2018 at time of GB diagnosis  - Blood culture at [facility] growing Gram pos cocci in clusters as of 5/1.  - F/u repeat blood cultures  - No signs and symptoms of active infection at the moment so will hold antibiotics for now  - Monitor vitals.     #DVT ppx   - heparin subq q8  - Increase ambulation as tolerated negative...

## 2021-05-02 NOTE — CONSULT NOTE ADULT - ASSESSMENT
32 yo man with PMH Guillain [place] (2018) , polysubstance abuse, HIV who presents with sudden onset of chest pain, left arm numbness (resolved) and acute onset of low back pain and worsening of lower extremity weakness from baseline.    He does have b/l lower extremity weakness on examination but there is significant pain.  Reflexes are intact and weakness is worse in proximal lower extremities compared to distal lower extremities.  I explained that with GBS reflexes are typically lost. The patient reports that he had intact reflexes when he previously had GBS.  Imaging of spine does not explain symptoms.    -Follow-up urine toxicology  -Will restart gabapentin. Patient does not know dose and his pharmacy ([facility]) is closed. Will start at 300 mg TID  -Will also restart baclofen (also does not know dose) at 10 mg BID.    Low suspicion for central pathology.  May get LP if symptoms not improving.

## 2021-05-02 NOTE — ED ADULT NURSE NOTE - CHIEF COMPLAINT QUOTE
patient BIBEMS from home c/o left-sided upper extremity numbness x1 hour PTA, headache and chest pain. hx guillian-barre, CVA in 2014. BEFAST negative in triage. code stroke called by dr. lino at [id number]. patient sent directly to CT scan. 324 ASA given by EMS PTA.

## 2021-05-02 NOTE — ED PROVIDER NOTE - CRITICAL CARE ATTENDING CONTRIBUTION TO CARE
Critical care time spent on evaluating and reevaluating pt,explaining results to patient, assessing previous records, consulting with consultants, ordering diagnostics, and documentation Violeta STEVENS

## 2021-05-02 NOTE — ED PROVIDER NOTE - PSYCHIATRIC, MLM
Alert and oriented to person, place, time/situation. anxious tearful mood and affect. no apparent risk to self or others.

## 2021-05-02 NOTE — ED PROVIDER NOTE - PROGRESS NOTE DETAILS
ct neg per radiology [facility] , Sal saw pt on telestroke. All cts neg. Thinks it is recurrence of gb and recommends mri of spine. Clled Dr. Kendall and discussed cased. Rec check reflexes. Recom mri entire spine with contrast. Violeta STEVENS Sal Edgar saw pt on telestroke. All cts neg. Thinks it is recurrence of gb and recommends mri of spine. Called Dr. Kendall and discussed cased. Rec check reflexes. Recom mri entire spine with contrast. Violeta STEVENS

## 2021-05-02 NOTE — ED ADULT NURSE NOTE - OBJECTIVE STATEMENT
Pt c/o numbness to left arm and pins and needles to left hand and fingers. pt reports symptoms started around 2300. also c/o chest pain and now back pain. pt reports history of bilateral leg weakness due to Guillain barre. Hx HIV+ and asthma. pt a/0x3 denies vision changes/loss. Code stroke activated by ED MD. pt sent to CT immediately after arrival to ED. VSS

## 2021-05-03 ENCOUNTER — TRANSCRIPTION ENCOUNTER (OUTPATIENT)
Age: 31
End: 2021-05-03

## 2021-05-03 LAB
CK SERPL-CCNC: 235 U/L — SIGNIFICANT CHANGE UP (ref 26–308)
COVID-19 SPIKE DOMAIN AB INTERP: POSITIVE
COVID-19 SPIKE DOMAIN ANTIBODY RESULT: 227 U/ML — HIGH
HIV-1 VIRAL LOAD RESULT: ABNORMAL
HIV1 RNA # SERPL NAA+PROBE: SIGNIFICANT CHANGE UP
HIV1 RNA SER-IMP: SIGNIFICANT CHANGE UP
HIV1 RNA SERPL NAA+PROBE-ACNC: ABNORMAL
HIV1 RNA SERPL NAA+PROBE-LOG#: 4.58 — SIGNIFICANT CHANGE UP
MAGNESIUM SERPL-MCNC: 1.8 MG/DL — SIGNIFICANT CHANGE UP (ref 1.6–2.6)
SARS-COV-2 IGG+IGM SERPL QL IA: 227 U/ML — HIGH
SARS-COV-2 IGG+IGM SERPL QL IA: POSITIVE

## 2021-05-03 PROCEDURE — 99232 SBSQ HOSP IP/OBS MODERATE 35: CPT | Mod: GC

## 2021-05-03 PROCEDURE — 99232 SBSQ HOSP IP/OBS MODERATE 35: CPT

## 2021-05-03 RX ORDER — BACLOFEN 100 %
1 POWDER (GRAM) MISCELLANEOUS
Qty: 60 | Refills: 0
Start: 2021-05-03 | End: 2021-06-01

## 2021-05-03 RX ADMIN — GABAPENTIN 300 MILLIGRAM(S): 400 CAPSULE ORAL at 05:08

## 2021-05-03 RX ADMIN — Medication 10 MILLIGRAM(S): at 21:05

## 2021-05-03 RX ADMIN — GABAPENTIN 300 MILLIGRAM(S): 400 CAPSULE ORAL at 21:05

## 2021-05-03 RX ADMIN — GABAPENTIN 300 MILLIGRAM(S): 400 CAPSULE ORAL at 13:28

## 2021-05-03 RX ADMIN — Medication 1 PATCH: at 10:01

## 2021-05-03 RX ADMIN — QUETIAPINE FUMARATE 200 MILLIGRAM(S): 200 TABLET, FILM COATED ORAL at 21:05

## 2021-05-03 RX ADMIN — BICTEGRAVIR SODIUM, EMTRICITABINE, AND TENOFOVIR ALAFENAMIDE FUMARATE 1 TABLET(S): 30; 120; 15 TABLET ORAL at 10:01

## 2021-05-03 RX ADMIN — Medication 10 MILLIGRAM(S): at 10:01

## 2021-05-03 NOTE — PROGRESS NOTE ADULT - SUBJECTIVE AND OBJECTIVE BOX
Interval History:  5/3/21: No new complaints today. Continues to have pain in back when trying to lift legs.    MEDICATIONS  (STANDING):  acetaminophen  IVPB .. 1000 milliGRAM(s) IV Intermittent once  baclofen 10 milliGRAM(s) Oral two times a day  bictegravir 50 mG/emtricitabine 200 mG/tenofovir alafenamide 25 mG (BIKTARVY) 1 Tablet(s) Oral daily  gabapentin 300 milliGRAM(s) Oral three times a day  heparin   Injectable 5000 Unit(s) SubCutaneous every 8 hours  lactated ringers. 1000 milliLiter(s) (125 mL/Hr) IV Continuous <Continuous>  nicotine -  14 mG/24Hr(s) Patch 1 patch Transdermal daily  QUEtiapine 200 milliGRAM(s) Oral at bedtime    MEDICATIONS  (PRN):      Allergies    Ceclor (Unknown)  Toradol (Anaphylaxis; Swelling)    Intolerances        PHYSICAL EXAM:  Vital Signs Last 24 Hrs  T(F): 97.9 (05-03-21 @ 08:55)  HR: 60 (05-03-21 @ 08:55)  BP: 99/52 (05-03-21 @ 08:55)  RR: 18 (05-03-21 @ 08:55)    GENERAL: NAD, well-groomed, well-developed  HEAD:  Atraumatic, Normocephalic  Neuro:  Awake, alert, no aphasia  CN: PERRL, EOMI, no nystagmus, no facial weakness, tongue protrudes in the midline  motor: normal tone, full strength in b/l Upper extremities, 4/5 in proximal lower extremities, 5-/5 in distal lower extremities on confrontation testing  sensory: intact to light touch  coordination: finger to nose intact bilaterally  DTRs: 2+ in b/l biceps, radialis, patellars    LABS:                        12.5   5.62  )-----------( 213      ( 02 May 2021 06:03 )             39.3     05-02    140  |  108  |  13  ----------------------------<  81  3.5   |  26  |  0.88    Ca    8.5      02 May 2021 06:03  Phos  2.9     05-02  Mg     1.8     05-03    TPro  8.0  /  Alb  3.6  /  TBili  1.0  /  DBili  x   /  AST  35  /  ALT  28  /  AlkPhos  69  05-02    PT/INR - ( 02 May 2021 00:02 )   PT: 13.9 sec;   INR: 1.20 ratio         PTT - ( 02 May 2021 00:02 )  PTT:35.3 sec      RADIOLOGY & ADDITIONAL STUDIES:      CT head/CTA head and neck/CT perfusion 5/2/21:  CT PERFUSION:  Perfusion imaging shows no evidence of a core infarct or tissue at risk.    CTA NECK:  No significant stenosis of the cervical carotid arteries based on NASCET criteria. Patent cervical vertebral arteries. No evidence of cervical carotid or vertebral artery dissection.    Enlarged nasopharyngeal adenoids.    CTA HEAD:  No high-grade stenosis or occlusion of the major proximal arterial branches.        MRI cervical/thoracic/lumbar spine with and without contrast 5/2/21:    Motion degraded exam.    Mild spondylitic changes.    No spinal cord or intraspinal abnormality identified.    Prominent nasopharyngeal soft tissue likely representing adenoidal hypertrophy. Correlation with direct visualization is advised.      5/2/21: CSF: WBC 0, RBC 26-> 0, protein 45, glucose 48, PCR negative

## 2021-05-03 NOTE — DISCHARGE NOTE PROVIDER - HOSPITAL COURSE
Hospital course: 30 yo man with PMH Guillain [place] (2018) , polysubstance abuse, HIV who presented with sudden onset of chest pain, left arm numbness (resolved) and acute onset of low back pain and worsening of lower extremity weakness from baseline. Recently completed NST at [facility] which showed no evidence of ischemia, and readmitted to [facility] in [place] with negative CTPE on 4/30/21, left AMA on 5/1, reports he did not agree with plan of care. Notably, [employer] reports reviewed and patient has been admitted or seen in ED at least five local hospitals, including [facility], and [place]. In ED Stroke code was called. CT head done showing no acute infarct. CTA wnl. Utox positive for cocaine and marijuana. CPK, ESR mildy elevated. Pt seen by neuro, restarted on gabapentin and baclofen. Lumbar puncture was done, unremarkable. Pt had recent positive blood cultures at [facility] on 5/1 growing gram positive cocci in clusters. Repeat blood cultures ordered that were WNL. 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 PT evaluated pt in setting of weakness, recommended discharge home with outpatient PT. Discussed with patient about cocaine cessation, given resources by SW. Pt refused inpatient/outpatient drug rehab. Pt currently hemodynamically stable, afebrile for discharge.       Vitals  T(F): 97.8 (05-03-21 @ 16:00), Max: 97.9 (05-03-21 @ 08:55)  HR: 75 (05-03-21 @ 16:00) (60 - 75)  BP: 136/66 (05-03-21 @ 16:00) (99/52 - 136/66)  RR: 18 (05-03-21 @ 16:00) (18 - 18)  SpO2: 99% (05-03-21 @ 16:00) (96% - 99%)    Physical Exam   Gen: NAD, comfortable  HENT: atraumatic head and ears, no gross abnormalities of ears, mucous membranes moist, no oral lesions, neck supple without masses/goiter/lymphadenopathy  CV: RRR, nl s1/s2, no M/R/G  Pulm: nl respiratory effort, CTAB, no wheezes/crackles/rhonchi  Back: no scoliosis, lordosis, or kyphosis, no tenderness  Abd: normoactive bowel sounds in all 4 quadrants, soft, nontender, nondistended, no rebound, no guarding, no masses  Extremities: no pedal edema, pedal pulses palpable   Skin: nl warm and dry, no wounds   Neuro: A&Ox3, answering questions appropriately, PERRL, EOMI, face symmetric, sensation equal bilaterally in face, tongue midline, no dysarthria, 5/5 strength in uppe, 3/4 strength in lower extremities bilaterally, sensation intact in upper and lower extremities bilaterally, nl finger to nose, and nl heel to shin   Pysch: no depression, no SI, no HI      Radiology:       < from: CT Angio Head w/ IV Cont (05.02.21 @ 00:11) >    CT PERFUSION:  Perfusion imaging shows no evidence of a core infarct or tissue at risk.    CTA NECK:  No significant stenosis of the cervical carotid arteries based on NASCET criteria. Patent cervical vertebral arteries. No evidence of cervical carotid or vertebral artery dissection.    Enlarged nasopharyngeal adenoids.    CTA HEAD:  No high-grade stenosis or occlusion of the major proximal arterial branches.    < end of copied text >    < from: MR Lumbar Spine w/wo IV Cont (05.02.21 @ 03:11) >    IMPRESSION:  Motion degraded exam.    Mild spondylitic changes.    No spinal cord or intraspinal abnormality identified.    Prominent nasopharyngeal soft tissue likely representing adenoidal hypertrophy. Correlation with direct visualization is advised.    < end of copied text >    < from: MR Cervical Spine w/wo IV Cont (05.02.21 @ 03:14) >    IMPRESSION:  Motion degraded exam.    Mild spondylitic changes.    No spinal cord or intraspinal abnormality identified.    Prominent nasopharyngeal soft tissue likely representing adenoidal hypertrophy. Correlation with direct visualization is advised.    < end of copied text >     Hospital course: 32 yo man with PMH Guillain [place] (2018) , polysubstance abuse, HIV who presented with sudden onset of chest pain, left arm numbness (resolved) and acute onset of low back pain and worsening of lower extremity weakness from baseline. Recently completed NST at [facility] which showed no evidence of ischemia, and readmitted to [facility] in [place] with negative CTPE on 4/30/21, left AMA on 5/1, reports he did not agree with plan of care. Notably, [employer] reports reviewed and patient has been admitted or seen in ED at least five local hospitals, including [facility], [facility], and [place]. In ED Stroke code was called. CT head done showing no acute infarct. CTA wnl. Utox positive for cocaine and marijuana. CPK, ESR mildy elevated. Pt seen by neuro, restarted on gabapentin and baclofen. Lumbar puncture was done, unremarkable. Pt had recent positive blood cultures at [facility] on 5/1 growing gram positive cocci in clusters. Repeat blood cultures ordered that were WNL. 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 PT evaluated pt in setting of weakness, recommended discharge home with outpatient PT. Discussed with patient about cocaine cessation, given resources by SW. Pt refused inpatient/outpatient drug rehab. Pt currently hemodynamically stable, afebrile for discharge. Pt should follow up with primary care physician      Vitals  T(F): 97.8 (05-03-21 @ 16:00), Max: 97.9 (05-03-21 @ 08:55)  HR: 75 (05-03-21 @ 16:00) (60 - 75)  BP: 136/66 (05-03-21 @ 16:00) (99/52 - 136/66)  RR: 18 (05-03-21 @ 16:00) (18 - 18)  SpO2: 99% (05-03-21 @ 16:00) (96% - 99%)    Physical Exam   Gen: NAD, comfortable  HENT: atraumatic head and ears, no gross abnormalities of ears, mucous membranes moist, no oral lesions, neck supple without masses/goiter/lymphadenopathy  CV: RRR, nl s1/s2, no M/R/G  Pulm: nl respiratory effort, CTAB, no wheezes/crackles/rhonchi  Back: no scoliosis, lordosis, or kyphosis, no tenderness  Abd: normoactive bowel sounds in all 4 quadrants, soft, nontender, nondistended, no rebound, no guarding, no masses  Extremities: no pedal edema, pedal pulses palpable   Skin: nl warm and dry, no wounds   Neuro: A&Ox3, answering questions appropriately, PERRL, EOMI, face symmetric, sensation equal bilaterally in face, tongue midline, no dysarthria, 5/5 strength in uppe, 3/4 strength in lower extremities bilaterally, sensation intact in upper and lower extremities bilaterally, nl finger to nose, and nl heel to shin   Pysch: no depression, no SI, no HI      Radiology:       < from: CT Angio Head w/ IV Cont (05.02.21 @ 00:11) >    CT PERFUSION:  Perfusion imaging shows no evidence of a core infarct or tissue at risk.    CTA NECK:  No significant stenosis of the cervical carotid arteries based on NASCET criteria. Patent cervical vertebral arteries. No evidence of cervical carotid or vertebral artery dissection.    Enlarged nasopharyngeal adenoids.    CTA HEAD:  No high-grade stenosis or occlusion of the major proximal arterial branches.    < end of copied text >    < from: MR Lumbar Spine w/wo IV Cont (05.02.21 @ 03:11) >    IMPRESSION:  Motion degraded exam.    Mild spondylitic changes.    No spinal cord or intraspinal abnormality identified.    Prominent nasopharyngeal soft tissue likely representing adenoidal hypertrophy. Correlation with direct visualization is advised.    < end of copied text >    < from: MR Cervical Spine w/wo IV Cont (05.02.21 @ 03:14) >    IMPRESSION:  Motion degraded exam.    Mild spondylitic changes.    No spinal cord or intraspinal abnormality identified.    Prominent nasopharyngeal soft tissue likely representing adenoidal hypertrophy. Correlation with direct visualization is advised.    < end of copied text >

## 2021-05-03 NOTE — PHYSICAL THERAPY INITIAL EVALUATION ADULT - PRECAUTIONS/LIMITATIONS, REHAB EVAL
IMAGING- MRI SPINE: Motion degraded exam. Mild spondylitic changes. No spinal cord or intraspinal abnormality identified. Prominent nasopharyngeal soft tissue likely representing adenoidal hypertrophy. Correlation with direct visualization is advised.; CT PERFUSION: Perfusion imaging shows no evidence of a core infarct or tissue at risk.; CTA NECK: No significant stenosis of the cervical carotid arteries based on NASCET criteria. Patent cervical vertebral arteries. No evidence of cervical carotid or vertebral artery dissection. Enlarged nasopharyngeal adenoids.; CTA HEAD:  No high-grade stenosis or occlusion of the major proximal arterial branches.; CT HEAD: No acute intracranial hemorrhage, mass effect, or CT evidence of an acute vascular territorial infarct. Cerebral volume loss.; CXR:  Clear lungs, unchanged./fall precautions

## 2021-05-03 NOTE — PROGRESS NOTE ADULT - ASSESSMENT
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    -Imaging of spine does not explain worsening of his chronic weakness  -Exam not suggestive of GBS and he has normal CSF protein  -Verify doses of gabapentin and baclofen with pharmacy. Better management of pain may help with mobility  -Suggest PT evaluation.    Discussed with Dr. Kincaid and team.  Will follow up as needed.

## 2021-05-03 NOTE — PHYSICAL THERAPY INITIAL EVALUATION ADULT - PERTINENT HX OF CURRENT PROBLEM, REHAB EVAL
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 pt with multiple recent hospital visits.

## 2021-05-03 NOTE — DISCHARGE NOTE PROVIDER - CARE PROVIDER_API CALL
Vijay Kim  [id number]  [address] [place] [address]  Phone: [phone]  Fax: [phone]  Follow Up Time:

## 2021-05-03 NOTE — DISCHARGE NOTE PROVIDER - CARE PROVIDERS DIRECT ADDRESSES
[email or web address]

## 2021-05-03 NOTE — DISCHARGE NOTE PROVIDER - NSDCCPCAREPLAN_GEN_ALL_CORE_FT
PRINCIPAL DISCHARGE DIAGNOSIS  Diagnosis: Drug-induced myopathy  Assessment and Plan of Treatment: You were admitted with weakness of your lower extremity. CT head/neck done unremarkable. You were evlauated by neurology, lumbar puncture was done which was unremarkable. Your overall bloodwork and imaging was unremarkable. It ismost  likely that your weakness is due to cocaine induced myopathy. We discussed  benefits and importance cocaine cessation, resourcres provided. Please followup with your PCP. We will restart you on baclofen and gabapentin as per neurology. Please take medications as prescribed.      SECONDARY DISCHARGE DIAGNOSES  Diagnosis: HIV disease  Assessment and Plan of Treatment: continue biktarvy  -f/u with PCP and at HIV clinic [facility]    Diagnosis: Atypical chest pain  Assessment and Plan of Treatment: -You had chest pain on admission that resolved. Cardiac workup was within normal limits here and at [facility]. It is likely that your chest pain is related to cocaine use. Please utlilize resources provided regarding cocaine cessation.     PRINCIPAL DISCHARGE DIAGNOSIS  Diagnosis: Drug-induced myopathy  Assessment and Plan of Treatment: You were admitted with weakness of your lower extremity. CT head/neck done unremarkable. You were evlauated by neurology, lumbar puncture was done which was unremarkable. Your overall bloodwork and imaging was unremarkable. It ismost  likely that your weakness is due to cocaine induced myopathy. We discussed  benefits and importance cocaine cessation and other toxic substances, resources provided. Risk of continued cocaine use also discussed. Please followup with your PCP. We will restart you on baclofen and gabapentin as per neurology. Please take medications as prescribed.      SECONDARY DISCHARGE DIAGNOSES  Diagnosis: HIV disease  Assessment and Plan of Treatment: continue biktarvy  -f/u with PCP and at HIV clinic [facility]    Diagnosis: Atypical chest pain  Assessment and Plan of Treatment: -You had chest pain on admission that resolved. Cardiac workup was within normal limits here and at [facility]. It is likely that your chest pain is related to cocaine use. Please utlilize resources provided regarding cocaine cessation.

## 2021-05-03 NOTE — PHYSICAL THERAPY INITIAL EVALUATION ADULT - REHAB POTENTIAL, PT EVAL
Pt indep with RW, OK to amb with nursing staff with no acute care PT needs. Pt to follow up with outpatient PT./good, to achieve stated therapy goals

## 2021-05-03 NOTE — PHYSICAL THERAPY INITIAL EVALUATION ADULT - GENERAL OBSERVATIONS, REHAB EVAL
Pt seen for 45min PT Eval. Pt s/p LE weakness, hx of polysubstance abuse and GBS. Pt rec'd semi supine in bed in NAD. Pt willing to work with PT.

## 2021-05-03 NOTE — PHYSICAL THERAPY INITIAL EVALUATION ADULT - ADDITIONAL COMMENTS
Pt reports he lives with parents in private home with 5 steps to enter, [place] with 5 steps up/down at entrance. Pt reports he is indep amb with/without AD, owns RW, Rollator, cane. Pt reports he is indep with ADLs.

## 2021-05-04 VITALS
SYSTOLIC BLOOD PRESSURE: 142 MMHG | DIASTOLIC BLOOD PRESSURE: 71 MMHG | RESPIRATION RATE: 18 BRPM | HEART RATE: 59 BPM | OXYGEN SATURATION: 100 % | TEMPERATURE: 98 F

## 2021-05-04 DIAGNOSIS — Z88.8 ALLERGY STATUS TO OTHER DRUGS, MEDICAMENTS AND BIOLOGICAL SUBSTANCES STATUS: ICD-10-CM

## 2021-05-04 DIAGNOSIS — F14.10 COCAINE ABUSE, UNCOMPLICATED: ICD-10-CM

## 2021-05-04 DIAGNOSIS — R07.9 CHEST PAIN, UNSPECIFIED: ICD-10-CM

## 2021-05-04 DIAGNOSIS — Z59.0 HOMELESSNESS: ICD-10-CM

## 2021-05-04 DIAGNOSIS — Z86.73 PERSONAL HISTORY OF TRANSIENT ISCHEMIC ATTACK (TIA), AND CEREBRAL INFARCTION WITHOUT RESIDUAL DEFICITS: ICD-10-CM

## 2021-05-04 DIAGNOSIS — F17.210 NICOTINE DEPENDENCE, CIGARETTES, UNCOMPLICATED: ICD-10-CM

## 2021-05-04 DIAGNOSIS — Z21 ASYMPTOMATIC HUMAN IMMUNODEFICIENCY VIRUS [HIV] INFECTION STATUS: ICD-10-CM

## 2021-05-04 PROCEDURE — 99239 HOSP IP/OBS DSCHRG MGMT >30: CPT | Mod: GC

## 2021-05-04 PROCEDURE — 99232 SBSQ HOSP IP/OBS MODERATE 35: CPT

## 2021-05-04 RX ADMIN — BICTEGRAVIR SODIUM, EMTRICITABINE, AND TENOFOVIR ALAFENAMIDE FUMARATE 1 TABLET(S): 30; 120; 15 TABLET ORAL at 10:56

## 2021-05-04 RX ADMIN — Medication 10 MILLIGRAM(S): at 10:56

## 2021-05-04 RX ADMIN — GABAPENTIN 300 MILLIGRAM(S): 400 CAPSULE ORAL at 05:50

## 2021-05-04 SDOH — ECONOMIC STABILITY - HOUSING INSECURITY: HOMELESSNESS: Z59.0

## 2021-05-04 NOTE — PROGRESS NOTE ADULT - SUBJECTIVE AND OBJECTIVE BOX
Interval History:  5/4/21: No new complaints. States that pain is better controlled today.    MEDICATIONS  (STANDING):  acetaminophen  IVPB .. 1000 milliGRAM(s) IV Intermittent once  baclofen 10 milliGRAM(s) Oral two times a day  bictegravir 50 mG/emtricitabine 200 mG/tenofovir alafenamide 25 mG (BIKTARVY) 1 Tablet(s) Oral daily  gabapentin 300 milliGRAM(s) Oral three times a day  heparin   Injectable 5000 Unit(s) SubCutaneous every 8 hours  lactated ringers. 1000 milliLiter(s) (125 mL/Hr) IV Continuous <Continuous>  nicotine -  14 mG/24Hr(s) Patch 1 patch Transdermal daily  QUEtiapine 200 milliGRAM(s) Oral at bedtime    MEDICATIONS  (PRN):      Allergies    Ceclor (Unknown)  Toradol (Anaphylaxis; Swelling)    Intolerances        PHYSICAL EXAM:  Vital Signs Last 24 Hrs  T(F): 97.5 (05-04-21 @ 08:31)  HR: 59 (05-04-21 @ 08:31)  BP: 142/71 (05-04-21 @ 08:31)  RR: 18 (05-04-21 @ 08:31)    GENERAL: NAD, well-groomed, well-developed  HEAD:  Atraumatic, Normocephalic  Neuro:  Awake, alert, no aphasia  CN: PERRL, EOMI, no nystagmus, no facial weakness, tongue protrudes in the midline  motor: normal tone, full strength in b/l Upper extremities, 4/5 in proximal lower extremities, 5-/5 in distal lower extremities on confrontation testing  sensory: intact to light touch  coordination: finger to nose intact bilaterally  DTRs: 2+ in b/l biceps, radialis, patellars      LABS:      Mg     1.8     05-03            RADIOLOGY & ADDITIONAL STUDIES:        CT head/CTA head and neck/CT perfusion 5/2/21:  CT PERFUSION:  Perfusion imaging shows no evidence of a core infarct or tissue at risk.    CTA NECK:  No significant stenosis of the cervical carotid arteries based on NASCET criteria. Patent cervical vertebral arteries. No evidence of cervical carotid or vertebral artery dissection.    Enlarged nasopharyngeal adenoids.    CTA HEAD:  No high-grade stenosis or occlusion of the major proximal arterial branches.        MRI cervical/thoracic/lumbar spine with and without contrast 5/2/21:    Motion degraded exam.    Mild spondylitic changes.    No spinal cord or intraspinal abnormality identified.    Prominent nasopharyngeal soft tissue likely representing adenoidal hypertrophy. Correlation with direct visualization is advised.      5/2/21: CSF: WBC 0, RBC 26-> 0, protein 45, glucose 48, PCR negative

## 2021-05-04 NOTE — PROGRESS NOTE ADULT - SUBJECTIVE AND OBJECTIVE BOX
HPI: 31M with PMH Nini Butt (2018), polysubstance abuse, HIV on Biktarvy, presented with upper and lower extremity numbness, weakness, pain and recurrent chest pain. States CP has resolved. Reports muscle weakness/pain started yesterday acutely. Follows HIV clinic in [facility], also follows at [facility]. Recently completed NST at [facility] which showed no evidence of ischemia, and readmitted to [facility] in [place] with negative CTPE on 4/30/21, left AMA on 5/1, reports he did not agree with plan of care. Notably, [facility] reports reviewed and patient has been admitted or seen in ED at least five local hospitals, including [facility], [facility], [facility], and [place]. He reports current chest pain, denies having used cocaine in last 24 hrs, UTOX + at [facility] on 4/30 and also HNT on 4/28. Denies etoh abuse, or hx of sickle cell or trait. Reports he has had HIV since birth; reportedly lives in [place] [facility] [place] [facility] with his parents and asked that we not contact them.  SH smokes 1/2ppd tobacco cigarettes, also smokes marijuana, and denies opiate abuse but reports percocet administration at [facility], which was not performed.  Denies ETOH, denies IVDA    SUBJECTIVE: Pt seen and examined at bedside. Pain improved today     REVIEW OF SYSTEMS:  CONSTITUTIONAL: No weakness, fevers or chills  EYES/ENT: No visual changes;  No vertigo or throat pain   NECK: No pain or stiffness  RESPIRATORY: No cough, wheezing, hemoptysis; No shortness of breath  CARDIOVASCULAR: No chest pain or palpitations  GASTROINTESTINAL: No abdominal or epigastric pain. No nausea, vomiting, or hematemesis; No diarrhea or constipation. No melena or hematochezia.  GENITOURINARY: No dysuria, frequency or hematuria  NEUROLOGICAL: + numbness and weakness  SKIN: No itching, burning, rashes, or lesions   All other review of systems is negative unless indicated above    Vital Signs Last 24 Hrs  T(C): 36.4 (04 May 2021 08:31), Max: 36.7 (03 May 2021 23:17)  T(F): 97.5 (04 May 2021 08:31), Max: 98.1 (03 May 2021 23:17)  HR: 59 (04 May 2021 08:31) (59 - 75)  BP: 142/71 (04 May 2021 08:31) (107/44 - 142/71)  BP(mean): --  RR: 18 (04 May 2021 08:31) (18 - 18)  SpO2: 100% (04 May 2021 08:31) (96% - 100%)    I&O's Summary    02 May 2021 07:01  -  02 May 2021 15:17  --------------------------------------------------------  IN: 0 mL / OUT: 300 mL / NET: -300 mL        PHYSICAL EXAM:  Constitutional: NAD, awake and alert  HEENT: PERR, EOMI, Normal Hearing, MMM  Neck: Soft and supple, No LAD, No JVD  Respiratory: Breath sounds are clear bilaterally, No wheezing, rales or rhonchi  Cardiovascular: S1 and S2, regular rate and rhythm, no Murmurs, gallops or rubs  Gastrointestinal: Bowel Sounds present, soft, nontender, nondistended, no guarding, no rebound  Extremities: No peripheral edema  Vascular: 2+ peripheral pulses  Neurological: A/O x 3, no focal deficits. Able to ambulate with dragging of left foot (Pt endorses that is his baseline).  Musculoskeletal: 5/5 strength in b/l upper extremities. 3-4/5 in b/l lower extremities.   Skin: No visible rashes    MEDICATIONS:  MEDICATIONS  (STANDING):  acetaminophen  IVPB .. 1000 milliGRAM(s) IV Intermittent once  baclofen 10 milliGRAM(s) Oral two times a day  bictegravir 50 mG/emtricitabine 200 mG/tenofovir alafenamide 25 mG (BIKTARVY) 1 Tablet(s) Oral daily  gabapentin 300 milliGRAM(s) Oral three times a day  heparin   Injectable 5000 Unit(s) SubCutaneous every 8 hours  lactated ringers. 1000 milliLiter(s) (125 mL/Hr) IV Continuous <Continuous>  magnesium oxide 400 milliGRAM(s) Oral three times a day with meals  nicotine -  14 mG/24Hr(s) Patch 1 patch Transdermal daily  QUEtiapine 200 milliGRAM(s) Oral at bedtime      LABS: All Labs Reviewed:                        12.5   5.62  )-----------( 213      ( 02 May 2021 06:03 )             39.3     05-02    140  |  108  |  13  ----------------------------<  81  3.5   |  26  |  0.88    Ca    8.5      02 May 2021 06:03  Phos  2.9     05-02  Mg     1.5     05-02    TPro  8.0  /  Alb  3.6  /  TBili  1.0  /  DBili  x   /  AST  35  /  ALT  28  /  AlkPhos  69  05-02    PT/INR - ( 02 May 2021 00:02 )   PT: 13.9 sec;   INR: 1.20 ratio           PTT - ( 02 May 2021 00:02 )  PTT:35.3 sec  CARDIAC MARKERS ( 02 May 2021 06:03 )  <0.015 ng/mL / x     / 470 U/L / x     / x      CARDIAC MARKERS ( 02 May 2021 00:02 )  <0.015 ng/mL / x     / 524 U/L / x     / x           RADIOLOGY/EKG:    < from: CT Angio Neck w/ IV Cont (05.02.21 @ 00:11) >  EXAM:  CT ANGIO BRAIN (W)AW IC                          EXAM:  CT ANGIO NECK (W)AW IC                          EXAM:  CT PERFUSION W MAPS IC                            PROCEDURE DATE:  05/02/2021          INTERPRETATION:  CLINICAL INFORMATION:  Stroke Code left upper extremity numbness.      TECHNIQUE:  During IV contrast administration, serial thin section CT images of the brain were obtained for CT perfusion. The raw data was sent to rapid ischemia view for post processing. Thereafter, a second bolus of IV contrast is administered to acquire a CT angiogram of the vertebral and carotid arterial vasculature from the aortic arch to the skull vertex. Maximum intensity projection images are submitted. 140 cc of Omnipaque 350 are administered without event.      The study is correlated with a prior examination from 3/25/2020.    FINDINGS:    CT PERFUSION:    Perfusion parameters are reported as follows:    CBF < 30%: 0 mL  TMax > 6s: 0 mL  Mismatch volume: 0 mL  Mismatch ratio: None.    CT ANGIOGRAM:    The aortic arch and proximal great vessels are unremarkable. The left vertebral artery arises directly from the aortic arch. The common carotid arteries are widely patent from the origins to the bifurcations. There is no chronic disease at the common carotid artery bifurcations. The cervical internal carotid arteries are patent without stenosis based on NASCET criteria. The cervical vertebral arteries are patent from the origins to the skull base. The right vertebral artery is dominant. There is no evidence of cervical carotid or vertebral artery dissection.    The skull base and intracranial carotid arteries are patent without significant stenosis. There is minimal atherosclerotic calcification involving the supraclinoid segments. The proximal MCAs and ACAs are patent without significant stenosis. The anterior communicating artery is not well visualized. Distal ERIC and MCA branches are grossly symmetric.    The skull base and intradural vertebral arteries and basilar artery are patent without significant stenosis. The proximal PCAs are patent without significant stenosis. The posterior communicating arteries are extremely hypoplastic or absent. The superior cerebellar artery origins, a right AICA/PICA, a left AICA, and aleft PICA are identified.    There is no evidence of an intracranial arterial aneurysm or arteriovenous malformation on CTA.    Intracranial superficial and deep venous sinus system are grossly unremarkable.    The regional soft tissues of the neck are otherwise unremarkable apart from enlarged nasopharyngeal adenoids. The lung apices demonstrate mild paraseptal emphysema. The osseous structures are unremarkable.      IMPRESSION:    CT PERFUSION:  Perfusion imaging shows no evidence of a core infarct or tissue at risk.    CTA NECK:  No significant stenosis of the cervical carotid arteries based on NASCET criteria. Patent cervical vertebral arteries. No evidence of cervical carotid or vertebral artery dissection.    Enlarged nasopharyngeal adenoids.    CTA HEAD:  No high-grade stenosis or occlusion of the major proximal arterial branches.    < end of copied text >        < from: CT Brain Stroke Protocol (05.01.21 @ 23:55) >  EXAM:  CT BRAIN STROKE PROTOCOL                            PROCEDURE DATE:  05/01/2021          INTERPRETATION:  NONCONTRAST CT OF THE BRAIN DATED 5/1/2021.    CLINICAL INFORMATION:  Stroke code, left arm and hand weakness.    TECHNIQUE: Axial CT images are obtained from the cranial vertex to the skullbase without the administration of IV contrast. Images are reformatted in sagittal and coronal planes.    The study is correlated with a prior exam from 10/25/2020.    FINDINGS:    There is no acute intra-axial or extra-axial hemorrhage. There is no mass effect or shift of the midline. The basal cisterns are not effaced. There is mild cerebral volume loss with prominence of the ventricles and sulci. Gray-white matter differentiation is preserved. There is no CT evidence of an acute vascular territorial infarct.    The regional soft tissues and osseous structures are unremarkable apart from scattered parotid calcifications. The visualized paranasal sinuses and tympanic/mastoid cavities are clear apart from minimal ethmoid and maxillary sinus mucosal thickening.    IMPRESSION:    No acute intracranial hemorrhage, mass effect, or CT evidence of an acute vascular territorial infarct.    Cerebral volume loss.    < end of copied text >   HPI: 31M with PMH Nini Butt (2018), polysubstance abuse, HIV on Biktarvy, presented with upper and lower extremity numbness, weakness, pain and recurrent chest pain. States CP has resolved. Reports muscle weakness/pain started yesterday acutely. Follows HIV clinic in [facility], also follows at [facility]. Recently [facility] which showed no evidence of ischemia, and readmitted to [facility] in [place] with negative CTPE on 4/30/21, left AMA on 5/1, reports he did not agree with plan of care. Notably, [facility] reports reviewed and patient has been admitted or seen in ED at least five local hospitals, including [facility], [facility], [facility], and [place]. He reports current chest pain, denies having used cocaine in last 24 hrs, UTOX + at [facility] on 4/30 and also HNT on 4/28. Denies etoh abuse, or hx of sickle cell or trait. Reports he has had HIV since birth; reportedly lives in [facility] [facility] [place] [facility] with his parents and asked that we not contact them.  SH smokes 1/2ppd tobacco cigarettes, also smokes marijuana, and denies opiate abuse but reports percocet administration at [facility], which was not performed.  Denies ETOH, denies IVDA    SUBJECTIVE: Pt seen and examined at bedside. Sxs improved today. Pt was to be discharged yesterday however did not want to leave as it was late. Pt agreeable to discharge today    REVIEW OF SYSTEMS:  CONSTITUTIONAL: No weakness, fevers or chills  EYES/ENT: No visual changes;  No vertigo or throat pain   NECK: No pain or stiffness  RESPIRATORY: No cough, wheezing, hemoptysis; No shortness of breath  CARDIOVASCULAR: No chest pain or palpitations  GASTROINTESTINAL: No abdominal or epigastric pain. No nausea, vomiting, or hematemesis; No diarrhea or constipation. No melena or hematochezia.  GENITOURINARY: No dysuria, frequency or hematuria  NEUROLOGICAL: + numbness and weakness  SKIN: No itching, burning, rashes, or lesions   All other review of systems is negative unless indicated above    Vital Signs Last 24 Hrs  T(C): 36.4 (04 May 2021 08:31), Max: 36.7 (03 May 2021 23:17)  T(F): 97.5 (04 May 2021 08:31), Max: 98.1 (03 May 2021 23:17)  HR: 59 (04 May 2021 08:31) (59 - 75)  BP: 142/71 (04 May 2021 08:31) (107/44 - 142/71)  BP(mean): --  RR: 18 (04 May 2021 08:31) (18 - 18)  SpO2: 100% (04 May 2021 08:31) (96% - 100%)    I&O's Summary    02 May 2021 07:01  -  02 May 2021 15:17  --------------------------------------------------------  IN: 0 mL / OUT: 300 mL / NET: -300 mL        PHYSICAL EXAM:  Constitutional: NAD, awake and alert  HEENT: PERR, EOMI, Normal Hearing, MMM  Neck: Soft and supple, No LAD, No JVD  Respiratory: Breath sounds are clear bilaterally, No wheezing, rales or rhonchi  Cardiovascular: S1 and S2, regular rate and rhythm, no Murmurs, gallops or rubs  Gastrointestinal: Bowel Sounds present, soft, nontender, nondistended, no guarding, no rebound  Extremities: No peripheral edema  Vascular: 2+ peripheral pulses  Neurological: A/O x 3, no focal deficits. Able to ambulate with dragging of left foot (Pt endorses that is his baseline).  Musculoskeletal: 5/5 strength in b/l upper extremities. 3-4/5 in b/l lower extremities.   Skin: No visible rashes    MEDICATIONS:  MEDICATIONS  (STANDING):  acetaminophen  IVPB .. 1000 milliGRAM(s) IV Intermittent once  baclofen 10 milliGRAM(s) Oral two times a day  bictegravir 50 mG/emtricitabine 200 mG/tenofovir alafenamide 25 mG (BIKTARVY) 1 Tablet(s) Oral daily  gabapentin 300 milliGRAM(s) Oral three times a day  heparin   Injectable 5000 Unit(s) SubCutaneous every 8 hours  lactated ringers. 1000 milliLiter(s) (125 mL/Hr) IV Continuous <Continuous>  magnesium oxide 400 milliGRAM(s) Oral three times a day with meals  nicotine -  14 mG/24Hr(s) Patch 1 patch Transdermal daily  QUEtiapine 200 milliGRAM(s) Oral at bedtime      LABS: All Labs Reviewed:                        12.5   5.62  )-----------( 213      ( 02 May 2021 06:03 )             39.3     05-02    140  |  108  |  13  ----------------------------<  81  3.5   |  26  |  0.88    Ca    8.5      02 May 2021 06:03  Phos  2.9     05-02  Mg     1.5     05-02    TPro  8.0  /  Alb  3.6  /  TBili  1.0  /  DBili  x   /  AST  35  /  ALT  28  /  AlkPhos  69  05-02    PT/INR - ( 02 May 2021 00:02 )   PT: 13.9 sec;   INR: 1.20 ratio           PTT - ( 02 May 2021 00:02 )  PTT:35.3 sec  CARDIAC MARKERS ( 02 May 2021 06:03 )  <0.015 ng/mL / x     / 470 U/L / x     / x      CARDIAC MARKERS ( 02 May 2021 00:02 )  <0.015 ng/mL / x     / 524 U/L / x     / x           RADIOLOGY/EKG:    < from: CT Angio Neck w/ IV Cont (05.02.21 @ 00:11) >  EXAM:  CT ANGIO BRAIN (W)AW IC                          EXAM:  CT ANGIO NECK (W)AW IC                          EXAM:  CT PERFUSION W MAPS IC                            PROCEDURE DATE:  05/02/2021          INTERPRETATION:  CLINICAL INFORMATION:  Stroke Code left upper extremity numbness.      TECHNIQUE:  During IV contrast administration, serial thin section CT images of the brain were obtained for CT perfusion. The raw data was sent to rapid ischemia view for post processing. Thereafter, a second bolus of IV contrast is administered to acquire a CT angiogram of the vertebral and carotid arterial vasculature from the aortic arch to the skull vertex. Maximum intensity projection images are submitted. 140 cc of Omnipaque 350 are administered without event.      The study is correlated with a prior examination from 3/25/2020.    FINDINGS:    CT PERFUSION:    Perfusion parameters are reported as follows:    CBF < 30%: 0 mL  TMax > 6s: 0 mL  Mismatch volume: 0 mL  Mismatch ratio: None.    CT ANGIOGRAM:    The aortic arch and proximal great vessels are unremarkable. The left vertebral artery arises directly from the aortic arch. The common carotid arteries are widely patent from the origins to the bifurcations. There is no chronic disease at the common carotid artery bifurcations. The cervical internal carotid arteries are patent without stenosis based on NASCET criteria. The cervical vertebral arteries are patent from the origins to the skull base. The right vertebral artery is dominant. There is no evidence of cervical carotid or vertebral artery dissection.    The skull base and intracranial carotid arteries are patent without significant stenosis. There is minimal atherosclerotic calcification involving the supraclinoid segments. The proximal MCAs and ACAs are patent without significant stenosis. The anterior communicating artery is not well visualized. Distal ERIC and MCA branches are grossly symmetric.    The skull base and intradural vertebral arteries and basilar artery are patent without significant stenosis. The proximal PCAs are patent without significant stenosis. The posterior communicating arteries are extremely hypoplastic or absent. The superior cerebellar artery origins, a right AICA/PICA, a left AICA, and aleft PICA are identified.    There is no evidence of an intracranial arterial aneurysm or arteriovenous malformation on CTA.    Intracranial superficial and deep venous sinus system are grossly unremarkable.    The regional soft tissues of the neck are otherwise unremarkable apart from enlarged nasopharyngeal adenoids. The lung apices demonstrate mild paraseptal emphysema. The osseous structures are unremarkable.      IMPRESSION:    CT PERFUSION:  Perfusion imaging shows no evidence of a core infarct or tissue at risk.    CTA NECK:  No significant stenosis of the cervical carotid arteries based on NASCET criteria. Patent cervical vertebral arteries. No evidence of cervical carotid or vertebral artery dissection.    Enlarged nasopharyngeal adenoids.    CTA HEAD:  No high-grade stenosis or occlusion of the major proximal arterial branches.    < end of copied text >        < from: CT Brain Stroke Protocol (05.01.21 @ 23:55) >  EXAM:  CT BRAIN STROKE PROTOCOL                            PROCEDURE DATE:  05/01/2021          INTERPRETATION:  NONCONTRAST CT OF THE BRAIN DATED 5/1/2021.    CLINICAL INFORMATION:  Stroke code, left arm and hand weakness.    TECHNIQUE: Axial CT images are obtained from the cranial vertex to the skullbase without the administration of IV contrast. Images are reformatted in sagittal and coronal planes.    The study is correlated with a prior exam from 10/25/2020.    FINDINGS:    There is no acute intra-axial or extra-axial hemorrhage. There is no mass effect or shift of the midline. The basal cisterns are not effaced. There is mild cerebral volume loss with prominence of the ventricles and sulci. Gray-white matter differentiation is preserved. There is no CT evidence of an acute vascular territorial infarct.    The regional soft tissues and osseous structures are unremarkable apart from scattered parotid calcifications. The visualized paranasal sinuses and tympanic/mastoid cavities are clear apart from minimal ethmoid and maxillary sinus mucosal thickening.    IMPRESSION:    No acute intracranial hemorrhage, mass effect, or CT evidence of an acute vascular territorial infarct.    Cerebral volume loss.    < end of copied text >

## 2021-05-04 NOTE — PROGRESS NOTE ADULT - ASSESSMENT
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 States CP has resolved. Follows HIV clinic in [facility], also follows at [facility]. Recently completed NST at [facility] which showed no evidence of ischemia, and readmitted to [facility] in [place] with negative CTPE on 4/30/21, left AMA on 5/1, reports he did not agree with plan of care. Notably, [facility] reports reviewed and patient has been admitted or seen in ED at least five local hospitals, including [facility], [facility], [facility], and [place]. Plan at this time check CPK, repeat blood culture for recent hx of + blood culture at [facility].    #Acute myopathy likely cocaine induced   - CPK mildly elevated with improvement noted on IVF hydration  - CT head/neck reviewed. No acute pathology  - Gabapentin and Baclofen reinstated  - Utox positive cocaine and THC  - ESR 28  - Increase activity as tolerated  - LP done - unremarkable   - Monitor clinically  - Fall precautions.     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  - F/u repeat blood cultures: negative  - No signs and symptoms of active infection at the moment so will hold antibiotics for now  - Monitor vitals.     #DVT ppx   - heparin subq q8  - Increase ambulation as tolerated]    Disposition: Pt was to be discharged yesterday but did not want to leave. Pt will be discharged today

## 2021-05-04 NOTE — PROGRESS NOTE ADULT - ATTENDING COMMENTS
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  - F/u repeat blood cultures: negative  - No signs and symptoms of active infection at the moment so will hold antibiotics for now    Disposition: Discharge today
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 States CP has resolved.    #Acute myopathy likely due to possibly Drug use, HIV infection, Inflammatory process including known GBS.   - CPK mildly elevated with improvement noted on IVF hydration  - CT head/neck reviewed. No acute pathology  - Gabapentin and Baclofen reinstated  - Urine toxicology positive for THC and cocaine  - ESR 28, unremarkable  - Increase activity as tolerated  - LP unremarkable  - Monitor clinically  - Fall precautions.   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  - F/u repeat blood cultures  - No signs and symptoms of active infection at the moment so will hold antibiotics for now  - Monitor vitals.

## 2021-05-04 NOTE — PROGRESS NOTE ADULT - ASSESSMENT
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    -Imaging of spine does not explain worsening of his chronic weakness  -Exam not suggestive of GBS and he has normal CSF protein  -Verify doses of gabapentin and baclofen with pharmacy. Better management of pain may help with mobility  -Suggest PT as outpatient.   -CK level initially increased but now improved (? drug related myopathy)  -It does seem that patient is at his baseline.        Will follow up as needed.

## 2021-05-09 ENCOUNTER — EMERGENCY (EMERGENCY)
Facility: HOSPITAL | Age: 31
LOS: 1 days | Discharge: HOME | End: 2021-05-09
Attending: STUDENT IN AN ORGANIZED HEALTH CARE EDUCATION/TRAINING PROGRAM | Admitting: STUDENT IN AN ORGANIZED HEALTH CARE EDUCATION/TRAINING PROGRAM
Payer: MEDICAID

## 2021-05-09 VITALS
RESPIRATION RATE: 16 BRPM | HEIGHT: 71 IN | HEART RATE: 84 BPM | SYSTOLIC BLOOD PRESSURE: 141 MMHG | DIASTOLIC BLOOD PRESSURE: 96 MMHG | OXYGEN SATURATION: 100 % | TEMPERATURE: 98 F | WEIGHT: 184.09 LBS

## 2021-05-09 DIAGNOSIS — Z98.890 OTHER SPECIFIED POSTPROCEDURAL STATES: Chronic | ICD-10-CM

## 2021-05-09 LAB
ANION GAP SERPL CALC-SCNC: 6 MMOL/L — SIGNIFICANT CHANGE UP (ref 5–17)
APAP SERPL-MCNC: 2 UG/ML — LOW (ref 10–30)
BASOPHILS # BLD AUTO: 0.01 K/UL — SIGNIFICANT CHANGE UP (ref 0–0.2)
BASOPHILS NFR BLD AUTO: 0.2 % — SIGNIFICANT CHANGE UP (ref 0–2)
BUN SERPL-MCNC: 13 MG/DL — SIGNIFICANT CHANGE UP (ref 7–23)
CALCIUM SERPL-MCNC: 8.1 MG/DL — LOW (ref 8.5–10.1)
CHLORIDE SERPL-SCNC: 108 MMOL/L — SIGNIFICANT CHANGE UP (ref 96–108)
CK MB BLD-MCNC: 0.5 % — SIGNIFICANT CHANGE UP (ref 0–3.5)
CK MB CFR SERPL CALC: 1.4 NG/ML — SIGNIFICANT CHANGE UP (ref 0–3.6)
CK SERPL-CCNC: 289 U/L — SIGNIFICANT CHANGE UP (ref 26–308)
CO2 SERPL-SCNC: 17 MMOL/L — LOW (ref 22–31)
CREAT SERPL-MCNC: 0.83 MG/DL — SIGNIFICANT CHANGE UP (ref 0.5–1.3)
EOSINOPHIL # BLD AUTO: 0.1 K/UL — SIGNIFICANT CHANGE UP (ref 0–0.5)
EOSINOPHIL NFR BLD AUTO: 1.7 % — SIGNIFICANT CHANGE UP (ref 0–6)
ETHANOL SERPL-MCNC: <10 MG/DL — SIGNIFICANT CHANGE UP (ref 0–10)
GLUCOSE SERPL-MCNC: 88 MG/DL — SIGNIFICANT CHANGE UP (ref 70–99)
HCT VFR BLD CALC: 38.1 % — LOW (ref 39–50)
HGB BLD-MCNC: 12.6 G/DL — LOW (ref 13–17)
IMM GRANULOCYTES NFR BLD AUTO: 0.3 % — SIGNIFICANT CHANGE UP (ref 0–1.5)
LYMPHOCYTES # BLD AUTO: 2.47 K/UL — SIGNIFICANT CHANGE UP (ref 1–3.3)
LYMPHOCYTES # BLD AUTO: 43.1 % — SIGNIFICANT CHANGE UP (ref 13–44)
MCHC RBC-ENTMCNC: 28.7 PG — SIGNIFICANT CHANGE UP (ref 27–34)
MCHC RBC-ENTMCNC: 33.1 GM/DL — SIGNIFICANT CHANGE UP (ref 32–36)
MCV RBC AUTO: 86.8 FL — SIGNIFICANT CHANGE UP (ref 80–100)
MONOCYTES # BLD AUTO: 0.44 K/UL — SIGNIFICANT CHANGE UP (ref 0–0.9)
MONOCYTES NFR BLD AUTO: 7.7 % — SIGNIFICANT CHANGE UP (ref 2–14)
NEUTROPHILS # BLD AUTO: 2.69 K/UL — SIGNIFICANT CHANGE UP (ref 1.8–7.4)
NEUTROPHILS NFR BLD AUTO: 47 % — SIGNIFICANT CHANGE UP (ref 43–77)
NRBC # BLD: 0 /100 WBCS — SIGNIFICANT CHANGE UP (ref 0–0)
PCP SPEC-MCNC: SIGNIFICANT CHANGE UP
PLATELET # BLD AUTO: 285 K/UL — SIGNIFICANT CHANGE UP (ref 150–400)
POTASSIUM SERPL-MCNC: 3.8 MMOL/L — SIGNIFICANT CHANGE UP (ref 3.5–5.3)
POTASSIUM SERPL-SCNC: 3.8 MMOL/L — SIGNIFICANT CHANGE UP (ref 3.5–5.3)
RBC # BLD: 4.39 M/UL — SIGNIFICANT CHANGE UP (ref 4.2–5.8)
RBC # FLD: 12.7 % — SIGNIFICANT CHANGE UP (ref 10.3–14.5)
SALICYLATES SERPL-MCNC: 1.7 MG/DL — LOW (ref 2.8–20)
SODIUM SERPL-SCNC: 131 MMOL/L — LOW (ref 135–145)
TROPONIN I SERPL-MCNC: <.015 NG/ML — SIGNIFICANT CHANGE UP (ref 0.01–0.04)
WBC # BLD: 5.73 K/UL — SIGNIFICANT CHANGE UP (ref 3.8–10.5)
WBC # FLD AUTO: 5.73 K/UL — SIGNIFICANT CHANGE UP (ref 3.8–10.5)

## 2021-05-09 PROCEDURE — 93010 ELECTROCARDIOGRAM REPORT: CPT

## 2021-05-09 PROCEDURE — 99285 EMERGENCY DEPT VISIT HI MDM: CPT

## 2021-05-09 PROCEDURE — 71045 X-RAY EXAM CHEST 1 VIEW: CPT | Mod: 26

## 2021-05-09 PROCEDURE — 82550 ASSAY OF CK (CPK): CPT

## 2021-05-09 PROCEDURE — 70450 CT HEAD/BRAIN W/O DYE: CPT | Mod: 26,MA

## 2021-05-09 PROCEDURE — 36415 COLL VENOUS BLD VENIPUNCTURE: CPT

## 2021-05-09 PROCEDURE — 80307 DRUG TEST PRSMV CHEM ANLYZR: CPT

## 2021-05-09 PROCEDURE — 82553 CREATINE MB FRACTION: CPT

## 2021-05-09 PROCEDURE — 93005 ELECTROCARDIOGRAM TRACING: CPT

## 2021-05-09 PROCEDURE — 70450 CT HEAD/BRAIN W/O DYE: CPT

## 2021-05-09 PROCEDURE — 72125 CT NECK SPINE W/O DYE: CPT

## 2021-05-09 PROCEDURE — 85025 COMPLETE CBC W/AUTO DIFF WBC: CPT

## 2021-05-09 PROCEDURE — 80048 BASIC METABOLIC PNL TOTAL CA: CPT

## 2021-05-09 PROCEDURE — 72125 CT NECK SPINE W/O DYE: CPT | Mod: 26,MA

## 2021-05-09 PROCEDURE — 99284 EMERGENCY DEPT VISIT MOD MDM: CPT | Mod: 25

## 2021-05-09 PROCEDURE — 71045 X-RAY EXAM CHEST 1 VIEW: CPT

## 2021-05-09 PROCEDURE — 84484 ASSAY OF TROPONIN QUANT: CPT

## 2021-05-09 RX ORDER — ASPIRIN/CALCIUM CARB/MAGNESIUM 324 MG
162 TABLET ORAL ONCE
Refills: 0 | Status: COMPLETED | OUTPATIENT
Start: 2021-05-09 | End: 2021-05-09

## 2021-05-09 RX ORDER — SODIUM CHLORIDE 9 MG/ML
1000 INJECTION INTRAMUSCULAR; INTRAVENOUS; SUBCUTANEOUS ONCE
Refills: 0 | Status: COMPLETED | OUTPATIENT
Start: 2021-05-09 | End: 2021-05-09

## 2021-05-09 RX ADMIN — Medication 162 MILLIGRAM(S): at 22:57

## 2021-05-09 RX ADMIN — SODIUM CHLORIDE 1000 MILLILITER(S): 9 INJECTION INTRAMUSCULAR; INTRAVENOUS; SUBCUTANEOUS at 22:06

## 2021-05-09 NOTE — ED PROVIDER NOTE - CARE PROVIDERS DIRECT ADDRESSES
,DirectAddress_Unknown,[email or web address]

## 2021-05-09 NOTE — ED PROVIDER NOTE - NEUROLOGICAL, MLM
Alert and oriented, no focal deficits, symmetric eyebrow raise and smile. elevates tongue and shoulders without difficulty. normal finger to nose. good  strength bilaterally. chronic lower ext weakness (4/5 strength, denies new weakness)

## 2021-05-09 NOTE — ED PROVIDER NOTE - CLINICAL SUMMARY MEDICAL DECISION MAKING FREE TEXT BOX
syncopal event PTA. recent drug use. differentials include but not limited to vaso vagal syncope, ACS, electrolyte abnormality, arrythmia, dehydration, infection. will check labs, EKG, CT head and cervical spine (to eval for mass or ICH), CXR, continue to monitor, IVF

## 2021-05-09 NOTE — ED ADULT NURSE NOTE - OBJECTIVE STATEMENT
patient BIBEMS a/0 x 4 with an anxious affect stating that he has been taking Nita, Ex, and smoking crack Cocaine for 5 days with no sleep.  he believes that he took some crack laced with something because he feels different than usual.  patient has headache.  [employer] at bedside, pt in police custody pending medical clearance.  supervisor made aware, given officer's information

## 2021-05-09 NOTE — ED ADULT NURSE NOTE - NS PRO PASSIVE SMOKE EXP
Above RN's/Staff's notes reviewed.  Reviewed medications and allergies.    SUBJECTIVE:    Nima Ruby is a 52 year old male who presents with one-week history of harsh pleuritic cough expectoration of yellow green mucus and nasal congestion with facial tenderness and body aches. No fever.  known history of asthma. Taking his Singulair and Advair and albuterol faithfully.      OBJECTIVE:    Vitals:    01/23/17 1541   BP: 132/82   Pulse: 71   Temp: 97.9 °F (36.6 °C)       General appearance:  Healthy, alert, in mild respiratory distress, cooperative  Skin:  Skin color, texture, turgor are normal. There are no bruises, rashes or lesions.  Eyes: Conjunctivae and sclerae normal and pupils equal, round, reactive to light    Nose:  Nasal turbinate swelling with erythema yellow-green nasal drainage facial tenderness over the maxillary sinuses  Ears:  External ears normal. Canals clear. Tympanic membranes are clear with all landmarks noted.  Throat:  Normal without tonsillar hypertrophy, no erythema, exudates or mucosal lesions.  Neck:  Supple, no lymphadenopathy.  Lungs:  Inspiratory and expiratory wheezing scattered rhonchi throughout no Rales.  Cardiac:  Regular rate and rhythm, no rubs, click gallops or murmurs  Abdomen:  Soft, no tenderness, bowel sounds normoactive  Extremities:  No pretibial edema      ASSESSMENT:  1. Acute wheezy bronchitis    2. Acute non-recurrent maxillary sinusitis          PLAN:  Orders Placed This Encounter   • cefdinir (OMNICEF) 300 MG capsule   • predniSONE (DELTASONE) 10 MG tablet     Call if condition worsens and follow-up with primary care physician in 7-10 days if needed  · Rest and increase activity as tolerated.  · Recheck with PCP or WIC, if not improving this week.  Tylenol for fever prn.     Yes...

## 2021-05-09 NOTE — ED PROVIDER NOTE - PROGRESS NOTE DETAILS
Reevaluated patient at bedside.  Patient feeling  improved.  taking po fluids. no chest pain or shortness of breath. patient now in police custody. patient is medially fit for confinement. Discussed the results of all diagnostic testing in ED and copies of all reports given.  will follow up with cardiology (referral provided) and primary care. An opportunity to ask questions was given.  Discussed the importance of prompt, close medical follow-up.  Patient will return with any changes, concerns or persistent / worsening symptoms.  Understanding of all instructions verbalized.

## 2021-05-09 NOTE — ED PROVIDER NOTE - PATIENT PORTAL LINK FT
You can access the FollowMyHealth Patient Portal offered by [facility] by registering at the following website: [email or web address]. By joining [facility]’s FollowMyHealth portal, you will also be able to view your health information using other applications (apps) compatible with our system.

## 2021-05-09 NOTE — ED PROVIDER NOTE - ATTENDING CONTRIBUTION TO CARE
32yo M ho GBS after flu shot, polysubstance abuse, pw with ?syncope after using marijuana and cocaine tonight. pt states he had crack cocaine and then passed out, now has pain everywhere, headache, feeling ill, dry throat, chest pain right when he woke up, now resolved.   pt vitals wnl, not hypertensive, will give fluids, ck, trop x 2, reassess 30yo M ho GBS after flu shot, polysubstance abuse, pw in custody with ?syncope after using marijuana and cocaine tonight. pt states he had crack cocaine and then passed out, now has pain everywhere, headache, feeling ill, dry throat, chest pain right when he woke up, now resolved however did not endorse this complaint to other PA or triage nurse, currently just states he doesn't feel well .   pt vitals wnl, not hypertensive, will give fluids, ck, trop , reassess

## 2021-05-09 NOTE — ED ADULT NURSE REASSESSMENT NOTE - NS ED NURSE REASSESS COMMENT FT1
patient handcuffed by left wrist to the bed by Police Officers, patient in their custody, pending blood test results

## 2021-05-09 NOTE — ED PROVIDER NOTE - CARE PROVIDER_API CALL
[facility],   Phone: (   )    -  Fax: (   )    -  Follow Up Time:     Mynor Tristan)  Internal Medicine  [address], [place] [id number]  Phone: [phone]  Fax: [phone]  Follow Up Time: 1-3 Days

## 2021-05-09 NOTE — ED ADULT NURSE NOTE - CAS EDN DISCHARGE ASSESSMENT
Alert and oriented to person, place and time/Patient baseline mental status/Drowsy/Symptoms improved

## 2021-05-09 NOTE — ED PROVIDER NOTE - OBJECTIVE STATEMENT
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 reports smoking crack cocaine multiple times a day (also used Nita and eight ectasy pills yesterday). was sitting in parking garage and "thinks he passed out" after smoking. woke up on the ground and did not feel well so called EMS. reports generalized body pain. no specific pain. mild HA. denies chest pain or shortness. no abd pain, n/v/d. reports chronic low back pain. no increase in low back pain. chronic weakness in lower legs. denies increase in weakness in legs. no loss of bowel or bladder control. no numbness to groin   PCP [facility]

## 2021-05-10 VITALS
RESPIRATION RATE: 16 BRPM | SYSTOLIC BLOOD PRESSURE: 145 MMHG | OXYGEN SATURATION: 100 % | HEART RATE: 69 BPM | TEMPERATURE: 98 F | DIASTOLIC BLOOD PRESSURE: 95 MMHG
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
## 2021-05-10 DIAGNOSIS — M62.82 RHABDOMYOLYSIS: ICD-10-CM

## 2021-05-10 DIAGNOSIS — G72.0 DRUG-INDUCED MYOPATHY: ICD-10-CM

## 2021-05-10 DIAGNOSIS — Z88.8 ALLERGY STATUS TO OTHER DRUGS, MEDICAMENTS AND BIOLOGICAL SUBSTANCES STATUS: ICD-10-CM

## 2021-05-10 DIAGNOSIS — F12.90 CANNABIS USE, UNSPECIFIED, UNCOMPLICATED: ICD-10-CM

## 2021-05-10 DIAGNOSIS — F17.210 NICOTINE DEPENDENCE, CIGARETTES, UNCOMPLICATED: ICD-10-CM

## 2021-05-10 DIAGNOSIS — Z86.73 PERSONAL HISTORY OF TRANSIENT ISCHEMIC ATTACK (TIA), AND CEREBRAL INFARCTION WITHOUT RESIDUAL DEFICITS: ICD-10-CM

## 2021-05-10 DIAGNOSIS — J45.909 UNSPECIFIED ASTHMA, UNCOMPLICATED: ICD-10-CM

## 2021-05-10 DIAGNOSIS — B20 HUMAN IMMUNODEFICIENCY VIRUS [HIV] DISEASE: ICD-10-CM

## 2021-05-10 DIAGNOSIS — T40.5X1A POISONING BY COCAINE, ACCIDENTAL (UNINTENTIONAL), INITIAL ENCOUNTER: ICD-10-CM

## 2021-05-13 ENCOUNTER — EMERGENCY (EMERGENCY)
Facility: HOSPITAL | Age: 31
LOS: 1 days | Discharge: HOME | End: 2021-05-13
Attending: EMERGENCY MEDICINE
Payer: COMMERCIAL

## 2021-05-13 VITALS
OXYGEN SATURATION: 99 % | SYSTOLIC BLOOD PRESSURE: 130 MMHG | DIASTOLIC BLOOD PRESSURE: 72 MMHG | HEART RATE: 72 BPM | RESPIRATION RATE: 18 BRPM

## 2021-05-13 VITALS
HEIGHT: 71 IN | DIASTOLIC BLOOD PRESSURE: 74 MMHG | OXYGEN SATURATION: 99 % | HEART RATE: 77 BPM | TEMPERATURE: 98 F | RESPIRATION RATE: 20 BRPM | SYSTOLIC BLOOD PRESSURE: 123 MMHG | WEIGHT: 179.9 LBS

## 2021-05-13 DIAGNOSIS — Z98.890 OTHER SPECIFIED POSTPROCEDURAL STATES: Chronic | ICD-10-CM

## 2021-05-13 LAB
AMPHET UR-MCNC: NEGATIVE — SIGNIFICANT CHANGE UP
APTT BLD: 32.8 SEC — SIGNIFICANT CHANGE UP (ref 27.5–35.5)
BARBITURATES UR SCN-MCNC: NEGATIVE — SIGNIFICANT CHANGE UP
BASOPHILS # BLD AUTO: 0.02 K/UL — SIGNIFICANT CHANGE UP (ref 0–0.2)
BASOPHILS NFR BLD AUTO: 0.5 % — SIGNIFICANT CHANGE UP (ref 0–2)
BENZODIAZ UR-MCNC: NEGATIVE — SIGNIFICANT CHANGE UP
COCAINE METAB.OTHER UR-MCNC: POSITIVE
D DIMER BLD IA.RAPID-MCNC: <150 NG/ML DDU — SIGNIFICANT CHANGE UP
EOSINOPHIL # BLD AUTO: 0.07 K/UL — SIGNIFICANT CHANGE UP (ref 0–0.5)
EOSINOPHIL NFR BLD AUTO: 1.7 % — SIGNIFICANT CHANGE UP (ref 0–6)
HCT VFR BLD CALC: 43.2 % — SIGNIFICANT CHANGE UP (ref 39–50)
HGB BLD-MCNC: 13.7 G/DL — SIGNIFICANT CHANGE UP (ref 13–17)
IMM GRANULOCYTES NFR BLD AUTO: 0.2 % — SIGNIFICANT CHANGE UP (ref 0–1.5)
INR BLD: 1.19 RATIO — HIGH (ref 0.88–1.16)
LYMPHOCYTES # BLD AUTO: 2.21 K/UL — SIGNIFICANT CHANGE UP (ref 1–3.3)
LYMPHOCYTES # BLD AUTO: 54 % — HIGH (ref 13–44)
MCHC RBC-ENTMCNC: 28.5 PG — SIGNIFICANT CHANGE UP (ref 27–34)
MCHC RBC-ENTMCNC: 31.7 GM/DL — LOW (ref 32–36)
MCV RBC AUTO: 89.8 FL — SIGNIFICANT CHANGE UP (ref 80–100)
METHADONE UR-MCNC: NEGATIVE — SIGNIFICANT CHANGE UP
MONOCYTES # BLD AUTO: 0.48 K/UL — SIGNIFICANT CHANGE UP (ref 0–0.9)
MONOCYTES NFR BLD AUTO: 11.7 % — SIGNIFICANT CHANGE UP (ref 2–14)
NEUTROPHILS # BLD AUTO: 1.3 K/UL — LOW (ref 1.8–7.4)
NEUTROPHILS NFR BLD AUTO: 31.9 % — LOW (ref 43–77)
NT-PROBNP SERPL-SCNC: 42 PG/ML — SIGNIFICANT CHANGE UP (ref 0–300)
OPIATES UR-MCNC: NEGATIVE — SIGNIFICANT CHANGE UP
PCP SPEC-MCNC: SIGNIFICANT CHANGE UP
PCP UR-MCNC: NEGATIVE — SIGNIFICANT CHANGE UP
PLATELET # BLD AUTO: 239 K/UL — SIGNIFICANT CHANGE UP (ref 150–400)
PROTHROM AB SERPL-ACNC: 13.7 SEC — HIGH (ref 10.6–13.6)
RBC # BLD: 4.81 M/UL — SIGNIFICANT CHANGE UP (ref 4.2–5.8)
RBC # FLD: 12.9 % — SIGNIFICANT CHANGE UP (ref 10.3–14.5)
THC UR QL: POSITIVE
TROPONIN T SERPL-MCNC: <0.01 NG/ML — SIGNIFICANT CHANGE UP (ref 0–0.06)
WBC # BLD: 4.09 K/UL — SIGNIFICANT CHANGE UP (ref 3.8–10.5)
WBC # FLD AUTO: 4.09 K/UL — SIGNIFICANT CHANGE UP (ref 3.8–10.5)

## 2021-05-13 PROCEDURE — 85610 PROTHROMBIN TIME: CPT

## 2021-05-13 PROCEDURE — 71045 X-RAY EXAM CHEST 1 VIEW: CPT

## 2021-05-13 PROCEDURE — 84484 ASSAY OF TROPONIN QUANT: CPT

## 2021-05-13 PROCEDURE — 83880 ASSAY OF NATRIURETIC PEPTIDE: CPT

## 2021-05-13 PROCEDURE — 93005 ELECTROCARDIOGRAM TRACING: CPT

## 2021-05-13 PROCEDURE — 80307 DRUG TEST PRSMV CHEM ANLYZR: CPT

## 2021-05-13 PROCEDURE — 85379 FIBRIN DEGRADATION QUANT: CPT

## 2021-05-13 PROCEDURE — 99285 EMERGENCY DEPT VISIT HI MDM: CPT

## 2021-05-13 PROCEDURE — 85730 THROMBOPLASTIN TIME PARTIAL: CPT

## 2021-05-13 PROCEDURE — 99284 EMERGENCY DEPT VISIT MOD MDM: CPT | Mod: 25

## 2021-05-13 PROCEDURE — 80053 COMPREHEN METABOLIC PANEL: CPT

## 2021-05-13 PROCEDURE — 85025 COMPLETE CBC W/AUTO DIFF WBC: CPT

## 2021-05-13 PROCEDURE — 71045 X-RAY EXAM CHEST 1 VIEW: CPT | Mod: 26

## 2021-05-13 PROCEDURE — 36415 COLL VENOUS BLD VENIPUNCTURE: CPT

## 2021-05-13 PROCEDURE — 93010 ELECTROCARDIOGRAM REPORT: CPT

## 2021-05-13 RX ORDER — ACETAMINOPHEN 500 MG
650 TABLET ORAL ONCE
Refills: 0 | Status: COMPLETED | OUTPATIENT
Start: 2021-05-13 | End: 2021-05-13

## 2021-05-13 RX ADMIN — Medication 650 MILLIGRAM(S): at 21:20

## 2021-05-13 NOTE — ED PROVIDER NOTE - NSFOLLOWUPCLINICS_GEN_ALL_ED_FT
[facility]  Cardiology  [address] [place] [address]  Phone: [phone]  Fax:

## 2021-05-13 NOTE — ED PROVIDER NOTE - OBJECTIVE STATEMENT
31yoM; with pmh signif for Asthma, HIV, GBS, CVA, Cocaine Abuse; now p/w chest pain--sscp, pressure, non-radiating, pleuritic, denies associated sob/palp. denies f/c/s. c/o mild cough. denies n/v. denies diaphoresis or lightheadedness. denies smoking. denies drug use. denies travel or trauma.  PMH:  Asthma, HIV, GBS, CVA, Cocaine Abuse;   SOCIAL: No tobacco/illicit substance use/socialEtOH

## 2021-05-13 NOTE — ED PROVIDER NOTE - PATIENT PORTAL LINK FT
You can access the FollowMyHealth Patient Portal offered by [facility] by registering at the following website: [email or web address]. By joining [facility]’s FollowMyHealth portal, you will also be able to view your health information using other applications (apps) compatible with our system.

## 2021-05-13 NOTE — ED PROVIDER NOTE - NS ED ROS FT
Constitutional: (-) fever  (-)chills  (-)sweats  Eyes/ENT: (-)   Cardiovascular: +) chest pain, (-) palpitations (-) edema   Respiratory: (-) cough, (-) shortness of breath   Gastrointestinal: (-)nausea  (-)vomiting, (-) diarrhea  (-) abdominal pain   :  (-)dysuria, (-)frequency, (-)urgency, (-)hematuria  Musculoskeletal: (-) neck pain, (-) back pain, (-) joint pain  Integumentary: (-) rash, (-) edema  Neurological: (-) headache, (-) altered mental status  (-)LOC

## 2021-05-13 NOTE — ED ADULT NURSE NOTE - PMH
Asthma    Chronic sinusitis    Closed fracture of multiple ribs of right side, initial encounter    Cocaine abuse    CVA (cerebral vascular accident)    GBS (Guillain [place] syndrome)    Guillain-[place]    Guillain-[place] syndrome    HIV (human immunodeficiency virus infection)  from birth  HIV (human immunodeficiency virus infection)    HIV disease    HIV positive    Homeless    Stroke

## 2021-05-13 NOTE — ED ADULT NURSE REASSESSMENT NOTE - NS ED NURSE REASSESS COMMENT FT1
Assumed pt care @ this time. Report received from day ANDREA Trejo. Pt A&Ox4 stating his chest pain has subsided @ this time. Pt denies any SOB. Pt remains on CM w/ in NSR. Respirations even & unlabored. NAD present. Pt made aware of plan of care and verbalized understanding.

## 2021-05-13 NOTE — ED PROVIDER NOTE - PMH
Asthma    Chronic sinusitis    Closed fracture of multiple ribs of right side, initial encounter    Cocaine abuse    CVA (cerebral vascular accident)    GBS (Guillain [place] syndrome)    Guillain-[place]ivins    Guillain-Bivins syndrome    HIV (human immunodeficiency virus infection)  from birth  HIV (human immunodeficiency virus infection)    HIV disease    HIV positive    Homeless    Stroke

## 2021-05-13 NOTE — ED ADULT NURSE NOTE - OBJECTIVE STATEMENT
Pt c/o intermittent chest pain x2 weeks, states symptoms started approx 1hr ago, states stopped after he called ambulance.  Pt reports all symptoms have since resolved, no acute s/s of respiratory distress noted or reported, will continue to monitor

## 2021-05-20 ENCOUNTER — INPATIENT (INPATIENT)
Facility: HOSPITAL | Age: 31
LOS: 5 days | Discharge: HOME | DRG: 556 | End: 2021-05-26
Attending: FAMILY MEDICINE | Admitting: HOSPITALIST
Payer: COMMERCIAL

## 2021-05-20 VITALS
SYSTOLIC BLOOD PRESSURE: 134 MMHG | TEMPERATURE: 97 F | OXYGEN SATURATION: 99 % | HEART RATE: 97 BPM | HEIGHT: 71 IN | RESPIRATION RATE: 20 BRPM | DIASTOLIC BLOOD PRESSURE: 90 MMHG

## 2021-05-20 DIAGNOSIS — Z21 ASYMPTOMATIC HUMAN IMMUNODEFICIENCY VIRUS [HIV] INFECTION STATUS: ICD-10-CM

## 2021-05-20 DIAGNOSIS — E87.6 HYPOKALEMIA: ICD-10-CM

## 2021-05-20 DIAGNOSIS — M54.5 LOW BACK PAIN: ICD-10-CM

## 2021-05-20 DIAGNOSIS — F19.10 OTHER PSYCHOACTIVE SUBSTANCE ABUSE, UNCOMPLICATED: ICD-10-CM

## 2021-05-20 DIAGNOSIS — R29.898 OTHER SYMPTOMS AND SIGNS INVOLVING THE MUSCULOSKELETAL SYSTEM: ICD-10-CM

## 2021-05-20 DIAGNOSIS — Z98.890 OTHER SPECIFIED POSTPROCEDURAL STATES: Chronic | ICD-10-CM

## 2021-05-20 LAB
ALBUMIN SERPL ELPH-MCNC: 4.4 G/DL — SIGNIFICANT CHANGE UP (ref 3.3–5.2)
ALP SERPL-CCNC: 73 U/L — SIGNIFICANT CHANGE UP (ref 40–120)
ALT FLD-CCNC: 14 U/L — SIGNIFICANT CHANGE UP
AMPHET UR-MCNC: NEGATIVE — SIGNIFICANT CHANGE UP
ANION GAP SERPL CALC-SCNC: 11 MMOL/L — SIGNIFICANT CHANGE UP (ref 5–17)
APPEARANCE UR: CLEAR — SIGNIFICANT CHANGE UP
AST SERPL-CCNC: 25 U/L — SIGNIFICANT CHANGE UP
BACTERIA # UR AUTO: ABNORMAL
BARBITURATES UR SCN-MCNC: NEGATIVE — SIGNIFICANT CHANGE UP
BASOPHILS # BLD AUTO: 0.03 K/UL — SIGNIFICANT CHANGE UP (ref 0–0.2)
BASOPHILS NFR BLD AUTO: 0.6 % — SIGNIFICANT CHANGE UP (ref 0–2)
BENZODIAZ UR-MCNC: NEGATIVE — SIGNIFICANT CHANGE UP
BILIRUB SERPL-MCNC: 0.8 MG/DL — SIGNIFICANT CHANGE UP (ref 0.4–2)
BILIRUB UR-MCNC: NEGATIVE — SIGNIFICANT CHANGE UP
BUN SERPL-MCNC: 17 MG/DL — SIGNIFICANT CHANGE UP (ref 8–20)
CALCIUM SERPL-MCNC: 8.9 MG/DL — SIGNIFICANT CHANGE UP (ref 8.6–10.2)
CHLORIDE SERPL-SCNC: 95 MMOL/L — LOW (ref 98–107)
CO2 SERPL-SCNC: 26 MMOL/L — SIGNIFICANT CHANGE UP (ref 22–29)
COCAINE METAB.OTHER UR-MCNC: POSITIVE
COLOR SPEC: YELLOW — SIGNIFICANT CHANGE UP
CREAT SERPL-MCNC: 0.87 MG/DL — SIGNIFICANT CHANGE UP (ref 0.5–1.3)
CRP SERPL-MCNC: 8 MG/L — HIGH
DIFF PNL FLD: NEGATIVE — SIGNIFICANT CHANGE UP
EOSINOPHIL # BLD AUTO: 0.1 K/UL — SIGNIFICANT CHANGE UP (ref 0–0.5)
EOSINOPHIL NFR BLD AUTO: 1.8 % — SIGNIFICANT CHANGE UP (ref 0–6)
EPI CELLS # UR: SIGNIFICANT CHANGE UP
ERYTHROCYTE [SEDIMENTATION RATE] IN BLOOD: 28 MM/HR — HIGH (ref 0–20)
GLUCOSE SERPL-MCNC: 78 MG/DL — SIGNIFICANT CHANGE UP (ref 70–99)
GLUCOSE UR QL: NEGATIVE MG/DL — SIGNIFICANT CHANGE UP
HCT VFR BLD CALC: 37.4 % — LOW (ref 39–50)
HGB BLD-MCNC: 12.1 G/DL — LOW (ref 13–17)
IMM GRANULOCYTES NFR BLD AUTO: 0 % — SIGNIFICANT CHANGE UP (ref 0–1.5)
KETONES UR-MCNC: ABNORMAL
LACTATE BLDV-MCNC: 0.8 MMOL/L — SIGNIFICANT CHANGE UP (ref 0.5–2)
LEUKOCYTE ESTERASE UR-ACNC: ABNORMAL
LYMPHOCYTES # BLD AUTO: 2.49 K/UL — SIGNIFICANT CHANGE UP (ref 1–3.3)
LYMPHOCYTES # BLD AUTO: 45.7 % — HIGH (ref 13–44)
MCHC RBC-ENTMCNC: 28.7 PG — SIGNIFICANT CHANGE UP (ref 27–34)
MCHC RBC-ENTMCNC: 32.4 GM/DL — SIGNIFICANT CHANGE UP (ref 32–36)
MCV RBC AUTO: 88.8 FL — SIGNIFICANT CHANGE UP (ref 80–100)
METHADONE UR-MCNC: NEGATIVE — SIGNIFICANT CHANGE UP
MONOCYTES # BLD AUTO: 0.75 K/UL — SIGNIFICANT CHANGE UP (ref 0–0.9)
MONOCYTES NFR BLD AUTO: 13.8 % — SIGNIFICANT CHANGE UP (ref 2–14)
NEUTROPHILS # BLD AUTO: 2.08 K/UL — SIGNIFICANT CHANGE UP (ref 1.8–7.4)
NEUTROPHILS NFR BLD AUTO: 38.1 % — LOW (ref 43–77)
NITRITE UR-MCNC: NEGATIVE — SIGNIFICANT CHANGE UP
OPIATES UR-MCNC: POSITIVE
PCP SPEC-MCNC: SIGNIFICANT CHANGE UP
PCP UR-MCNC: NEGATIVE — SIGNIFICANT CHANGE UP
PH UR: 5 — SIGNIFICANT CHANGE UP (ref 5–8)
PLATELET # BLD AUTO: 242 K/UL — SIGNIFICANT CHANGE UP (ref 150–400)
POTASSIUM SERPL-MCNC: 3.1 MMOL/L — LOW (ref 3.5–5.3)
POTASSIUM SERPL-SCNC: 3.1 MMOL/L — LOW (ref 3.5–5.3)
PROT SERPL-MCNC: 7.9 G/DL — SIGNIFICANT CHANGE UP (ref 6.6–8.7)
PROT UR-MCNC: 15 MG/DL
RBC # BLD: 4.21 M/UL — SIGNIFICANT CHANGE UP (ref 4.2–5.8)
RBC # FLD: 12.7 % — SIGNIFICANT CHANGE UP (ref 10.3–14.5)
RBC CASTS # UR COMP ASSIST: SIGNIFICANT CHANGE UP /HPF (ref 0–4)
SODIUM SERPL-SCNC: 132 MMOL/L — LOW (ref 135–145)
SP GR SPEC: 1.02 — SIGNIFICANT CHANGE UP (ref 1.01–1.02)
THC UR QL: POSITIVE
UROBILINOGEN FLD QL: 1 MG/DL
WBC # BLD: 5.45 K/UL — SIGNIFICANT CHANGE UP (ref 3.8–10.5)
WBC # FLD AUTO: 5.45 K/UL — SIGNIFICANT CHANGE UP (ref 3.8–10.5)
WBC UR QL: SIGNIFICANT CHANGE UP

## 2021-05-20 PROCEDURE — 99223 1ST HOSP IP/OBS HIGH 75: CPT

## 2021-05-20 PROCEDURE — G1004: CPT

## 2021-05-20 PROCEDURE — 70450 CT HEAD/BRAIN W/O DYE: CPT | Mod: 26,MG

## 2021-05-20 PROCEDURE — 72131 CT LUMBAR SPINE W/O DYE: CPT | Mod: 26,MG

## 2021-05-20 PROCEDURE — 99285 EMERGENCY DEPT VISIT HI MDM: CPT

## 2021-05-20 RX ORDER — PANTOPRAZOLE SODIUM 20 MG/1
40 TABLET, DELAYED RELEASE ORAL
Refills: 0 | Status: DISCONTINUED | OUTPATIENT
Start: 2021-05-20 | End: 2021-05-26

## 2021-05-20 RX ORDER — GABAPENTIN 400 MG/1
200 CAPSULE ORAL THREE TIMES A DAY
Refills: 0 | Status: DISCONTINUED | OUTPATIENT
Start: 2021-05-20 | End: 2021-05-20

## 2021-05-20 RX ORDER — QUETIAPINE FUMARATE 200 MG/1
200 TABLET, FILM COATED ORAL AT BEDTIME
Refills: 0 | Status: DISCONTINUED | OUTPATIENT
Start: 2021-05-20 | End: 2021-05-26

## 2021-05-20 RX ORDER — HEPARIN SODIUM 5000 [USP'U]/ML
5000 INJECTION INTRAVENOUS; SUBCUTANEOUS EVERY 8 HOURS
Refills: 0 | Status: DISCONTINUED | OUTPATIENT
Start: 2021-05-20 | End: 2021-05-26

## 2021-05-20 RX ORDER — BICTEGRAVIR SODIUM, EMTRICITABINE, AND TENOFOVIR ALAFENAMIDE FUMARATE 30; 120; 15 MG/1; MG/1; MG/1
1 TABLET ORAL DAILY
Refills: 0 | Status: DISCONTINUED | OUTPATIENT
Start: 2021-05-20 | End: 2021-05-26

## 2021-05-20 RX ORDER — GABAPENTIN 400 MG/1
300 CAPSULE ORAL THREE TIMES A DAY
Refills: 0 | Status: DISCONTINUED | OUTPATIENT
Start: 2021-05-20 | End: 2021-05-26

## 2021-05-20 RX ORDER — OXYCODONE HYDROCHLORIDE 5 MG/1
5 TABLET ORAL EVERY 12 HOURS
Refills: 0 | Status: DISCONTINUED | OUTPATIENT
Start: 2021-05-20 | End: 2021-05-24

## 2021-05-20 RX ORDER — BACLOFEN 100 %
10 POWDER (GRAM) MISCELLANEOUS
Refills: 0 | Status: DISCONTINUED | OUTPATIENT
Start: 2021-05-20 | End: 2021-05-26

## 2021-05-20 RX ORDER — MORPHINE SULFATE 50 MG/1
4 CAPSULE, EXTENDED RELEASE ORAL ONCE
Refills: 0 | Status: DISCONTINUED | OUTPATIENT
Start: 2021-05-20 | End: 2021-05-20

## 2021-05-20 RX ORDER — POTASSIUM CHLORIDE 20 MEQ
40 PACKET (EA) ORAL ONCE
Refills: 0 | Status: COMPLETED | OUTPATIENT
Start: 2021-05-20 | End: 2021-05-20

## 2021-05-20 RX ORDER — ACETAMINOPHEN 500 MG
650 TABLET ORAL EVERY 6 HOURS
Refills: 0 | Status: DISCONTINUED | OUTPATIENT
Start: 2021-05-20 | End: 2021-05-26

## 2021-05-20 RX ADMIN — QUETIAPINE FUMARATE 200 MILLIGRAM(S): 200 TABLET, FILM COATED ORAL at 23:53

## 2021-05-20 RX ADMIN — HEPARIN SODIUM 5000 UNIT(S): 5000 INJECTION INTRAVENOUS; SUBCUTANEOUS at 23:54

## 2021-05-20 RX ADMIN — PANTOPRAZOLE SODIUM 40 MILLIGRAM(S): 20 TABLET, DELAYED RELEASE ORAL at 23:55

## 2021-05-20 RX ADMIN — GABAPENTIN 300 MILLIGRAM(S): 400 CAPSULE ORAL at 23:54

## 2021-05-20 RX ADMIN — MORPHINE SULFATE 4 MILLIGRAM(S): 50 CAPSULE, EXTENDED RELEASE ORAL at 18:01

## 2021-05-20 RX ADMIN — BICTEGRAVIR SODIUM, EMTRICITABINE, AND TENOFOVIR ALAFENAMIDE FUMARATE 1 TABLET(S): 30; 120; 15 TABLET ORAL at 23:53

## 2021-05-20 RX ADMIN — Medication 40 MILLIEQUIVALENT(S): at 20:01

## 2021-05-20 RX ADMIN — Medication 1 TABLET(S): at 23:55

## 2021-05-20 NOTE — H&P ADULT - NSHPSOCIALHISTORY_GEN_ALL_CORE
smokes, pt. uses marijuana, herion, cocaine , denies IVDA. smokes, pt. uses marijuana, herion, cocaine , denies IVDA. no regular etoh use reported.

## 2021-05-20 NOTE — ED PROVIDER NOTE - NS ED ROS FT
ROS: no CP/SOB. no cough. no fever. no n/v/d/c. no abd pain. no rash. no bleeding. +urinary complaints. +weakness. +vision changes. +HA. +back pain. no extremity swelling/deformity. No change in mental status.

## 2021-05-20 NOTE — ED PROVIDER NOTE - CLINICAL SUMMARY MEDICAL DECISION MAKING FREE TEXT BOX
patient with acute LE weakness, h/o GBS, has LE weakness but normal/hyperreflexia. normal sensation. but complaining of back pain/saddle anesthesia/incontinence. concern for cord syndrome/infectious process as patient has had multiple lumbar punctures. will check labs. ct head/spine. MR spine. neuro/spine consult. hold off on LP until MR complete due to possible infectious process

## 2021-05-20 NOTE — ED PROVIDER NOTE - CARE PLAN
Principal Discharge DX:	Acute midline low back pain without sciatica  Secondary Diagnosis:	Weakness  Secondary Diagnosis:	HIV (human immunodeficiency virus infection)

## 2021-05-20 NOTE — H&P ADULT - NSICDXFAMILYHX_GEN_ALL_CORE_FT
FAMILY HISTORY:  Mother  Still living? Unknown  FH: HIV infection, Age at diagnosis: Age Unknown

## 2021-05-20 NOTE — H&P ADULT - HISTORY OF PRESENT ILLNESS
32 y/o male born with HIV ( intrauterine transmission as per pt ) came to the ER as his B/L LE weakness is progressing ( ongoing for years as per pt ). Pt. was seen at [facility] about 2 weeks ago and had mri of C/T/L spine which did not show any acute findings, also had LP done no organism and growth. pt. was discharged. Pt. is homeless and living in a shelter. pt. reports that he walks around with help and holds on to things but not using cane or walker. no new pain in spine area, no fever, no fall reported. pt. is good control of urine and stool. sensations are intact over both lower ext. It appears pt. got frustrated as his B/L LE weakness is not getting better so called ambulance. pt. uses marijuna, herion, cocaine , denies IVDA.    As per blood work from May 2nd his viral load was 37, 671. As per pt. he uses his hiv medicine. pt. 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 currently has no sob, no cp, no abd. pain. no n/v/d. no fever. 32 y/o male born with HIV ( intrauterine transmission as per pt ) came to the ER as his B/L LE weakness is progressing ( ongoing for years as per pt ). Pt. was seen at [facility] about 2 weeks ago and had mri of C/T/L spine which did not show any acute findings, also had LP done no organism and growth. pt. was discharged. Pt. is homeless and living in a shelter. pt. reports that he walks around with help and holds on to things but not using cane or walker. no new pain in spine area, no fever, no fall reported. pt. is good control of urine and stool. sensations are intact over both lower ext. It appears pt. got frustrated as his B/L LE weakness is not getting better so called ambulance. pt. agrees that there has been no sig. no new weakness in his body, appears needs more structured environment for his activities of daily living and came to the hospital from his shelter. pt. uses marijuna, herion, cocaine , denies IVDA.    As per blood work from May 2nd his viral load was 37, 671. As per pt. he uses his hiv medicine. pt. 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 currently has no sob, no cp, no abd. pain. no n/v/d. no fever.

## 2021-05-20 NOTE — ED PROVIDER NOTE - OBJECTIVE STATEMENT
32yo M with HIV from birth on HAART, unclear levels viral load may be detectable, states in 2018 had GBS paralased for 8 months and recovered uses cane intermittently, states he has 'flares' often, last was at [facility] ~2 weeks ago no LP was discharged, prior to that 2months ago with LP but states only diagnosed 1 time with GBS. has chornic back pain but worsening recently midline low lumbar. no fever chills. notes mild frontal HA and intermittent blurry vision x1 day. no UE weakness. acute onset LE weakness b/l LE intermittent numbness in butt cheeks, has chornic incontience issues but may be slightly worse. no trauma. no IVDA. having spasms of LE which are painful and worse with touch.

## 2021-05-20 NOTE — ED ADULT NURSE NOTE - OBJECTIVE STATEMENT
Patient having weakness to bi lateral LE, Pt has history of Guillain barre in 2018 says he gets these flare ups, having lower back pain no numbness, and headaches all starting this am.

## 2021-05-20 NOTE — ED PROVIDER NOTE - PHYSICAL EXAMINATION
Gen: NAD, AOx3, poor hygiene, anxious  Head: NCAT  HEENT: EOMI, oral mucosa moist, normal conjunctiva, neck supple  Lung: CTAB, no respiratory distress  CV: rrr, no murmur, Normal perfusion  Abd: soft, NTND  MSK: No edema, no visible deformities, +ttp midline lumbar region  Neuro: 3/5 plantar flexion b/l, 4/5 dorsiflexion b/l, 3/5 b/l hip flexion, 4/5 knee extension, +2 reflex b/l, no saddle anesthesia  Skin: No rash   Psych: normal affect

## 2021-05-20 NOTE — ED ADULT TRIAGE NOTE - CHIEF COMPLAINT QUOTE
Pt c/o leg weakness.  Reports hx of guillain barre syndrome and believes he is having an exacerbation.  Pt extremely anxious.

## 2021-05-20 NOTE — ED PROVIDER NOTE - PMH
Asthma    Chronic sinusitis    Closed fracture of multiple ribs of right side, initial encounter    Cocaine abuse    CVA (cerebral vascular accident)    GBS (Guillain [place] syndrome)    Guillain-[place]    Guillain-[place]ino[place] syndrome    HIV (human immunodeficiency virus infection)  from birth  HIV (human immunodeficiency virus infection)    HIV disease    HIV positive    Homeless    Stroke

## 2021-05-20 NOTE — H&P ADULT - NSICDXPASTMEDICALHX_GEN_ALL_CORE_FT
PAST MEDICAL HISTORY:  Asthma     Chronic sinusitis     Closed fracture of multiple ribs of right side, initial encounter     Cocaine abuse     CVA (cerebral vascular accident)     GBS (Guillain [place] syndrome)     Guillain-[place]     Guillain-[place] syndrome     HIV (human immunodeficiency virus infection)     HIV (human immunodeficiency virus infection) from birth    HIV disease     HIV positive     Homeless     Stroke

## 2021-05-20 NOTE — H&P ADULT - PROBLEM SELECTOR PLAN 1
Pt. has chronic B/L Lower ext. weakness and hiv possibly contributing to his progressive neuropathy, pt. agrees that there is no new weakness in his body and agrees to go to a rehab/ log term care facility as it is getting difficult for him to do his activities of daily living without enough help. pt's neurontin and baclofen continued, ct LS no acute findings, MRI LS spine ordered by ER physician and to be followed. neurologist dr. Beauchamp contact by ER physician who will follow pt.

## 2021-05-20 NOTE — H&P ADULT - ASSESSMENT
30 y/o male born with HIV ( intrauterine transmission as per pt ) came to the ER as his B/L LE weakness is progressing ( ongoing for years as per pt ). Pt. was seen at [facility] about 2 weeks ago and had mri of C/T/L spine which did not show any acute findings, also had LP done no organism and growth. pt. was discharged. Pt. is homeless and living in a shelter. pt. reports that he walks around with help and holds on to things but not using cane or walker. no new pain in spine area, no fever, no fall reported. pt. is good control of urine and stool. sensations are intact over both lower ext. It appears pt. got frustrated as his B/L LE weakness is not getting better so called ambulance. pt. agrees that there has been no sig. no new weakness in his body, appears needs more structured environment for his activities of daily living and came to the hospital from his shelter. pt. uses marijuna, herion, cocaine , denies IVDA.    As per blood work from May 2nd his viral load was 37, 671. As per pt. he uses his hiv medicine. pt. 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 currently has no sob, no cp, no abd. pain. no n/v/d. no fever.

## 2021-05-20 NOTE — H&P ADULT - PROBLEM SELECTOR PLAN 4
pt. uses marijuna, herion, cocaine , denies IVDA. pt. advised to stop using drugs. Pt. plans to stop.

## 2021-05-20 NOTE — ED ADULT NURSE NOTE - PMH
Asthma    Chronic sinusitis    Closed fracture of multiple ribs of right side, initial encounter    Cocaine abuse    CVA (cerebral vascular accident)    GBS (Guillain [place] syndrome)    Guillain-[place]    Guillain-[place] syndrome    HIV (human immunodeficiency virus infection)  from birth  HIV (human immunodeficiency virus infection)    HIV disease    HIV positive    Homeless    Stroke

## 2021-05-20 NOTE — H&P ADULT - NSHPPHYSICALEXAM_GEN_ALL_CORE
General: AA male lying in bed not in distress.  HEENT: AT, NC. PERRL. intact EOM. no throat erythema or exudate.   Neck: supple. no JVD.   Chest: CTA bilaterally  Heart: S1,S2. RRR. no heart murmur. no edema.  Abdomen: soft. non-tender. non-distended. + BS.   spine : no pain to palpation over entire spine area, no warmth over spine appreciated.  Ext: no calf tenderness on either side, negative SLR B/L. sensory intact B/L. distal pulses intact.   Neuro: AAO x3. baseline motor deficit of B/L LE, chronic, no speech disorder, 1 + DTR lower ext.   Skin: no obvious rash noted, dry skin over B/L feet. warm and dry skin.  psych : no agitation, co-operative. no si/hi.

## 2021-05-20 NOTE — ED ADULT NURSE NOTE - NSIMPLEMENTINTERV_GEN_ALL_ED
Implemented All Fall Risk Interventions:  [place] to call system. Call bell, personal items and telephone within reach. Instruct patient to call for assistance. Room bathroom lighting operational. Non-slip footwear when patient is off stretcher. Physically safe environment: no spills, clutter or unnecessary equipment. Stretcher in lowest position, wheels locked, appropriate side rails in place. Provide visual cue, wrist band, yellow gown, etc. Monitor gait and stability. Monitor for mental status changes and reorient to person, place, and time. Review medications for side effects contributing to fall risk. Reinforce activity limits and safety measures with patient and family.

## 2021-05-21 LAB
ANION GAP SERPL CALC-SCNC: 10 MMOL/L — SIGNIFICANT CHANGE UP (ref 5–17)
APTT BLD: 31.1 SEC — SIGNIFICANT CHANGE UP (ref 27.5–35.5)
BUN SERPL-MCNC: 15 MG/DL — SIGNIFICANT CHANGE UP (ref 8–20)
CALCIUM SERPL-MCNC: 8.6 MG/DL — SIGNIFICANT CHANGE UP (ref 8.6–10.2)
CHLORIDE SERPL-SCNC: 102 MMOL/L — SIGNIFICANT CHANGE UP (ref 98–107)
CO2 SERPL-SCNC: 23 MMOL/L — SIGNIFICANT CHANGE UP (ref 22–29)
COVID-19 SPIKE DOMAIN AB INTERP: POSITIVE
COVID-19 SPIKE DOMAIN ANTIBODY RESULT: >250 U/ML — HIGH
CREAT SERPL-MCNC: 0.81 MG/DL — SIGNIFICANT CHANGE UP (ref 0.5–1.3)
GLUCOSE SERPL-MCNC: 90 MG/DL — SIGNIFICANT CHANGE UP (ref 70–99)
INR BLD: 1.18 RATIO — HIGH (ref 0.88–1.16)
MAGNESIUM SERPL-MCNC: 1.5 MG/DL — LOW (ref 1.6–2.6)
POTASSIUM SERPL-MCNC: 3.4 MMOL/L — LOW (ref 3.5–5.3)
POTASSIUM SERPL-SCNC: 3.4 MMOL/L — LOW (ref 3.5–5.3)
PROTHROM AB SERPL-ACNC: 13.6 SEC — SIGNIFICANT CHANGE UP (ref 10.6–13.6)
RAPID RVP RESULT: SIGNIFICANT CHANGE UP
SARS-COV-2 IGG+IGM SERPL QL IA: >250 U/ML — HIGH
SARS-COV-2 IGG+IGM SERPL QL IA: POSITIVE
SARS-COV-2 RNA SPEC QL NAA+PROBE: SIGNIFICANT CHANGE UP
SODIUM SERPL-SCNC: 135 MMOL/L — SIGNIFICANT CHANGE UP (ref 135–145)

## 2021-05-21 PROCEDURE — 72158 MRI LUMBAR SPINE W/O & W/DYE: CPT | Mod: 26

## 2021-05-21 PROCEDURE — 99232 SBSQ HOSP IP/OBS MODERATE 35: CPT

## 2021-05-21 PROCEDURE — 99223 1ST HOSP IP/OBS HIGH 75: CPT

## 2021-05-21 RX ORDER — POTASSIUM CHLORIDE 20 MEQ
40 PACKET (EA) ORAL EVERY 4 HOURS
Refills: 0 | Status: COMPLETED | OUTPATIENT
Start: 2021-05-21 | End: 2021-05-21

## 2021-05-21 RX ORDER — MAGNESIUM SULFATE 500 MG/ML
2 VIAL (ML) INJECTION
Refills: 0 | Status: DISCONTINUED | OUTPATIENT
Start: 2021-05-21 | End: 2021-05-21

## 2021-05-21 RX ORDER — MAGNESIUM SULFATE 500 MG/ML
1 VIAL (ML) INJECTION ONCE
Refills: 0 | Status: DISCONTINUED | OUTPATIENT
Start: 2021-05-21 | End: 2021-05-21

## 2021-05-21 RX ORDER — MAGNESIUM SULFATE 500 MG/ML
2 VIAL (ML) INJECTION
Refills: 0 | Status: COMPLETED | OUTPATIENT
Start: 2021-05-21 | End: 2021-05-21

## 2021-05-21 RX ADMIN — GABAPENTIN 300 MILLIGRAM(S): 400 CAPSULE ORAL at 05:48

## 2021-05-21 RX ADMIN — HEPARIN SODIUM 5000 UNIT(S): 5000 INJECTION INTRAVENOUS; SUBCUTANEOUS at 21:31

## 2021-05-21 RX ADMIN — Medication 1 TABLET(S): at 11:18

## 2021-05-21 RX ADMIN — QUETIAPINE FUMARATE 200 MILLIGRAM(S): 200 TABLET, FILM COATED ORAL at 21:31

## 2021-05-21 RX ADMIN — PANTOPRAZOLE SODIUM 40 MILLIGRAM(S): 20 TABLET, DELAYED RELEASE ORAL at 09:03

## 2021-05-21 RX ADMIN — Medication 10 MILLIGRAM(S): at 18:35

## 2021-05-21 RX ADMIN — Medication 50 GRAM(S): at 11:18

## 2021-05-21 RX ADMIN — GABAPENTIN 300 MILLIGRAM(S): 400 CAPSULE ORAL at 21:31

## 2021-05-21 RX ADMIN — Medication 50 GRAM(S): at 09:02

## 2021-05-21 RX ADMIN — Medication 40 MILLIEQUIVALENT(S): at 11:18

## 2021-05-21 RX ADMIN — BICTEGRAVIR SODIUM, EMTRICITABINE, AND TENOFOVIR ALAFENAMIDE FUMARATE 1 TABLET(S): 30; 120; 15 TABLET ORAL at 13:53

## 2021-05-21 RX ADMIN — HEPARIN SODIUM 5000 UNIT(S): 5000 INJECTION INTRAVENOUS; SUBCUTANEOUS at 13:53

## 2021-05-21 RX ADMIN — Medication 10 MILLIGRAM(S): at 05:48

## 2021-05-21 RX ADMIN — GABAPENTIN 300 MILLIGRAM(S): 400 CAPSULE ORAL at 13:53

## 2021-05-21 RX ADMIN — Medication 40 MILLIEQUIVALENT(S): at 09:02

## 2021-05-21 RX ADMIN — Medication 1 MILLIGRAM(S): at 01:15

## 2021-05-21 NOTE — CONSULT NOTE ADULT - SUBJECTIVE AND OBJECTIVE BOX
[facility]                                     Neurology at [place]                                 Jolanta Marte, & Rene                                  [address]                                        [place], [place], [address]                                             [phone]    CC: leg weakness  HPI:  As per blood work from May 2nd his viral load was 37, 671. As per pt. he uses his hiv medicine. pt. 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 currently has no sob, no cp, no abd. pain. no n/v/d. no fever. (20 May 2021 21:03)  The patient is a 31y Male who presented with b/l leg wekaness, chronic, has history of GBS diagnosed few years ago in [place].  He has been to [facility] earlier this month and had normal LP, exam inconsistent with GBS and normal LS-spine MRI.  Per my colleagues note he has been to several ERs about this problem with no positive objective findings.  He can move his legs spontaneously (observed to turn in bed, flex at knees, but can not when asked.  He had a nromal LS-psine MRI during this admission.  Neurology is asked to evaluate.    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    Guillain-[place]    Asthma    CVA (cerebral vascular accident)    Closed fracture of multiple ribs of right side, initial encounter    Cocaine abuse    Chronic sinusitis    Homeless    HIV disease    HIV (human immunodeficiency virus infection)    GBS (Guillain [place] syndrome)    Guillain-[place] syndrome    HIV positive    Stroke    History of orthopedic surgery  left arm        MEDICATIONS  (STANDING):  baclofen 10 milliGRAM(s) Oral two times a day  bictegravir 50 mG/emtricitabine 200 mG/tenofovir alafenamide 25 mG (BIKTARVY) 1 Tablet(s) Oral daily  gabapentin 300 milliGRAM(s) Oral three times a day  heparin   Injectable 5000 Unit(s) SubCutaneous every 8 hours  multivitamin 1 Tablet(s) Oral daily  pantoprazole    Tablet 40 milliGRAM(s) Oral before breakfast  QUEtiapine 200 milliGRAM(s) Oral at bedtime    MEDICATIONS  (PRN):  acetaminophen   Tablet .. 650 milliGRAM(s) Oral every 6 hours PRN Mild Pain (1 - 3), Moderate Pain (4 - 6)  oxyCODONE    IR 5 milliGRAM(s) Oral every 12 hours PRN Severe Pain (7 - 10)      Allergies    Ceclor (Unknown)  Toradol (Anaphylaxis; Swelling)  Toradol (Swelling)    Intolerances        SOCIAL HISTORY:  no tob,   no alcohol   (+) drugs- cocaine and MJ, last use about a week ago    FAMILY HISTORY:  FH: HIV infection (Mother)      ROS: 14 point ROS negative other than what is present in HPI or below  b/l leg weakness    Vital Signs Last 24 Hrs  T(C): 36.7 (21 May 2021 11:30), Max: 36.7 (21 May 2021 11:30)  T(F): 98 (21 May 2021 11:30), Max: 98 (21 May 2021 11:30)  HR: 83 (21 May 2021 11:30) (61 - 111)  BP: 115/60 (21 May 2021 11:30) (104/64 - 134/90)  BP(mean): --  RR: 20 (21 May 2021 11:30) (18 - 20)  SpO2: 98% (21 May 2021 11:30) (96% - 100%)      General: NAD    Detailed Neurologic Exam:    Mental status: The patient is awake and alert and has decreased  attention span.  The patient is fully oriented in 3 spheres. The patient is oriented to current events. The patient is able to name objects, follow commands, repeat sentences.    Cranial nerves: Pupils equal and react symmetrically to light. There is no visual field deficit to threat. Extraocular motion is full with no nystagmus. There is no ptosis. Facial sensation is intact. Facial musculature is symmetric. Palate elevates symmetrically.  Tongue is midline.    Motor: There is normal bulk and tone.  There is no tremor.  Strength is 5/5 in the right arm and 3/5 leg.   Strength is 5/5 in the left arm and 3/5  leg.  Suspect effort related weakness (+) Muse's sign, (+) frogleg    Sensation: Intact to light touch and pin in 4 extremities    Reflexes: 2+ throughout and plantar responses are flexor.    Cerebellar: There is no dysmetria on finger to nose testing.    Gait : deferred    LABS:                         12.1   5.45  )-----------( 242      ( 20 May 2021 18:02 )             37.4       05-21    135  |  102  |  15.0  ----------------------------<  90  3.4<L>   |  23.0  |  0.81    Ca    8.6      21 May 2021 02:16  Mg     1.5     05-21    TPro  7.9  /  Alb  4.4  /  TBili  0.8  /  DBili  x   /  AST  25  /  ALT  14  /  AlkPhos  73  05-20      PT/INR - ( 21 May 2021 02:16 )   PT: 13.6 sec;   INR: 1.18 ratio         PTT - ( 21 May 2021 02:16 )  PTT:31.1 sec    RADIOLOGY & ADDITIONAL STUDIES (independently reviewed unless otherwise noted):  MRI LS- spine no central canal stenosis, no degenerative changes

## 2021-05-21 NOTE — CONSULT NOTE ADULT - ASSESSMENT
The patient is a 31y Male who is followed by neurology because of leg weakness    Leg weakness  chronic  this is not recurrent GBS  There is no significant LS spine pathology on MRI  suspect some element of poor effort in his exam  PT eval pending  May need rehab following discharge    Thank you for allowing me to participate in the care of your patient    Karri Stover MD, PhD   [id number]

## 2021-05-22 LAB
ANION GAP SERPL CALC-SCNC: 10 MMOL/L — SIGNIFICANT CHANGE UP (ref 5–17)
BUN SERPL-MCNC: 10 MG/DL — SIGNIFICANT CHANGE UP (ref 8–20)
CALCIUM SERPL-MCNC: 9.1 MG/DL — SIGNIFICANT CHANGE UP (ref 8.6–10.2)
CHLORIDE SERPL-SCNC: 106 MMOL/L — SIGNIFICANT CHANGE UP (ref 98–107)
CO2 SERPL-SCNC: 23 MMOL/L — SIGNIFICANT CHANGE UP (ref 22–29)
CREAT SERPL-MCNC: 0.79 MG/DL — SIGNIFICANT CHANGE UP (ref 0.5–1.3)
GLUCOSE SERPL-MCNC: 83 MG/DL — SIGNIFICANT CHANGE UP (ref 70–99)
MAGNESIUM SERPL-MCNC: 1.9 MG/DL — SIGNIFICANT CHANGE UP (ref 1.6–2.6)
PHOSPHATE SERPL-MCNC: 2.8 MG/DL — SIGNIFICANT CHANGE UP (ref 2.4–4.7)
POTASSIUM SERPL-MCNC: 4.4 MMOL/L — SIGNIFICANT CHANGE UP (ref 3.5–5.3)
POTASSIUM SERPL-SCNC: 4.4 MMOL/L — SIGNIFICANT CHANGE UP (ref 3.5–5.3)
SODIUM SERPL-SCNC: 138 MMOL/L — SIGNIFICANT CHANGE UP (ref 135–145)

## 2021-05-22 PROCEDURE — 99232 SBSQ HOSP IP/OBS MODERATE 35: CPT

## 2021-05-22 RX ADMIN — GABAPENTIN 300 MILLIGRAM(S): 400 CAPSULE ORAL at 21:38

## 2021-05-22 RX ADMIN — GABAPENTIN 300 MILLIGRAM(S): 400 CAPSULE ORAL at 12:44

## 2021-05-22 RX ADMIN — HEPARIN SODIUM 5000 UNIT(S): 5000 INJECTION INTRAVENOUS; SUBCUTANEOUS at 21:38

## 2021-05-22 RX ADMIN — Medication 1 TABLET(S): at 12:45

## 2021-05-22 RX ADMIN — HEPARIN SODIUM 5000 UNIT(S): 5000 INJECTION INTRAVENOUS; SUBCUTANEOUS at 05:01

## 2021-05-22 RX ADMIN — Medication 10 MILLIGRAM(S): at 05:01

## 2021-05-22 RX ADMIN — GABAPENTIN 300 MILLIGRAM(S): 400 CAPSULE ORAL at 05:01

## 2021-05-22 RX ADMIN — PANTOPRAZOLE SODIUM 40 MILLIGRAM(S): 20 TABLET, DELAYED RELEASE ORAL at 05:01

## 2021-05-22 RX ADMIN — HEPARIN SODIUM 5000 UNIT(S): 5000 INJECTION INTRAVENOUS; SUBCUTANEOUS at 12:45

## 2021-05-22 RX ADMIN — QUETIAPINE FUMARATE 200 MILLIGRAM(S): 200 TABLET, FILM COATED ORAL at 21:38

## 2021-05-22 RX ADMIN — BICTEGRAVIR SODIUM, EMTRICITABINE, AND TENOFOVIR ALAFENAMIDE FUMARATE 1 TABLET(S): 30; 120; 15 TABLET ORAL at 12:44

## 2021-05-22 RX ADMIN — Medication 10 MILLIGRAM(S): at 16:33

## 2021-05-23 PROCEDURE — 99232 SBSQ HOSP IP/OBS MODERATE 35: CPT

## 2021-05-23 RX ORDER — BACLOFEN 100 %
0 POWDER (GRAM) MISCELLANEOUS
Qty: 0 | Refills: 0 | DISCHARGE

## 2021-05-23 RX ADMIN — PANTOPRAZOLE SODIUM 40 MILLIGRAM(S): 20 TABLET, DELAYED RELEASE ORAL at 04:58

## 2021-05-23 RX ADMIN — HEPARIN SODIUM 5000 UNIT(S): 5000 INJECTION INTRAVENOUS; SUBCUTANEOUS at 04:58

## 2021-05-23 RX ADMIN — Medication 1 TABLET(S): at 11:16

## 2021-05-23 RX ADMIN — Medication 10 MILLIGRAM(S): at 16:42

## 2021-05-23 RX ADMIN — Medication 10 MILLIGRAM(S): at 04:59

## 2021-05-23 RX ADMIN — GABAPENTIN 300 MILLIGRAM(S): 400 CAPSULE ORAL at 12:58

## 2021-05-23 RX ADMIN — QUETIAPINE FUMARATE 200 MILLIGRAM(S): 200 TABLET, FILM COATED ORAL at 21:11

## 2021-05-23 RX ADMIN — GABAPENTIN 300 MILLIGRAM(S): 400 CAPSULE ORAL at 04:59

## 2021-05-23 RX ADMIN — GABAPENTIN 300 MILLIGRAM(S): 400 CAPSULE ORAL at 21:11

## 2021-05-23 RX ADMIN — BICTEGRAVIR SODIUM, EMTRICITABINE, AND TENOFOVIR ALAFENAMIDE FUMARATE 1 TABLET(S): 30; 120; 15 TABLET ORAL at 11:16

## 2021-05-23 NOTE — DISCHARGE NOTE PROVIDER - NSDCCPCAREPLAN_GEN_ALL_CORE_FT
PRINCIPAL DISCHARGE DIAGNOSIS  Diagnosis: Acute midline low back pain without sciatica  Assessment and Plan of Treatment:       SECONDARY DISCHARGE DIAGNOSES  Diagnosis: Leg weakness, bilateral  Assessment and Plan of Treatment: Leg weakness, bilateral    Diagnosis: Weakness  Assessment and Plan of Treatment:     Diagnosis: Drug abuse  Assessment and Plan of Treatment: Drug abuse    Diagnosis: HIV (human immunodeficiency virus infection)  Assessment and Plan of Treatment:

## 2021-05-23 NOTE — DISCHARGE NOTE PROVIDER - NSDCMRMEDTOKEN_GEN_ALL_CORE_FT
baclofen 10 mg oral tablet: 1 tab(s) orally 2 times a day  Biktarvy oral tablet: 1 tab(s) orally once a day  gabapentin 100 mg oral capsule: 1 tab(s) orally 3 times a day  nicotine 14 mg/24 hr transdermal film, extended release: 1 patch transdermally once a day   novotin:   SEROquel 200 mg oral tablet: 1 tab(s) orally once a day (at bedtime)  vitarin:

## 2021-05-23 NOTE — PHYSICAL THERAPY INITIAL EVALUATION ADULT - PERTINENT HX OF CURRENT PROBLEM, REHAB EVAL
32 y/o male born with HIV ( intrauterine transmission as per pt ) came to the ER as his B/L LE weakness is progressing ( ongoing for years as per pt ). Pt. was seen at [facility] about 2 weeks ago and had mri of C/T/L spine which did not show any acute finding

## 2021-05-23 NOTE — DISCHARGE NOTE PROVIDER - HOSPITAL COURSE
30 y/o male born with HIV came to the ER as his B/L LE weakness is progressing and  ongoing for years as per pt, Pt. was seen at [facility] about 2 weeks ago and had mri of C/T/L spine which did not show any acute findings, also had LP done no organism and growth. pt. was discharged. Pt. is homeless and living in a shelter. pt. reports that he walks around with help and holds on to things but not using cane or walker. pt. uses marijuana, heroin, cocaine , denies IVDA.    As per blood work from May 2nd his viral load was 37, 671. As per pt. he uses his hiv medicine. pt. 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 Pt admitted for further eval. Seen by neurologist,does not think any GBS Exacerbation/recurrence. His b/l  Leg weakness is chronic. he complains that it is worsening from baseline.  Per Neuro- nothing more to offer  PT rec rehab vs [facility]  c/s Acute rehab  dc plan rehab vs [facility]  Neurontin and baclofen continued      # HIV   Cont home antiviral    # Polysubstance abuse  lifestyle modification counselling done    # Hypomagnesemia, Hypokalemia  repleted    DVT : SQ Heparin  kisha JERRY for safe disposition  Plan- Pending acute rehab eval.  care of plan dw pt. Pt agreed      Time spent>35 mins 32 y/o male born with HIV came to the ER as his B/L LE weakness is progressing and  ongoing for years as per pt, Pt. was seen at [facility] about 2 weeks ago and had mri of C/T/L spine which did not show any acute findings, also had LP done no organism and growth. pt. was discharged. Pt. is homeless and living in a shelter. pt. reports that he walks around with help and holds on to things but not using cane or walker. pt. uses marijuana, heroin, cocaine , denies IVDA.    As per blood work from May 2nd his viral load was 37, 671. As per pt. he uses his hiv medicine. pt. 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 Pt admitted for further eval. Seen by neurologist,does not think any GBS Exacerbation/recurrence. His b/l  Leg weakness is chronic. he complains that it is worsening from baseline.  Per Neuro- nothing more to offer  dc plan to [facility]  Neurontin and baclofen continued    #Rt SPERFICAL THROMBOPHEBITIS  -WARM COMPRESSION      # HIV   Cont home antiviral    # Polysubstance abuse  lifestyle modification counselling done    # Hypomagnesemia, Hypokalemia  repleted    DVT : SQ Heparin  Pt was waiting for disposition to [facility] but pt eloped.

## 2021-05-23 NOTE — PHYSICAL THERAPY INITIAL EVALUATION ADULT - ADDITIONAL COMMENTS
Pt reports living with his parents with 4 steps to enter with handrail and 5 steps up/down inside with handrail. Pt reporting his mom has been assisting him with mobility recently. Pt reporting he owns cane and RW which hes been using. Pt does not drive/work. Pt is a questionable historian... confirmed with CCC post evaluation - pt lives at a shelter.

## 2021-05-24 LAB — SARS-COV-2 RNA SPEC QL NAA+PROBE: SIGNIFICANT CHANGE UP

## 2021-05-24 PROCEDURE — 99232 SBSQ HOSP IP/OBS MODERATE 35: CPT

## 2021-05-24 PROCEDURE — 99222 1ST HOSP IP/OBS MODERATE 55: CPT

## 2021-05-24 RX ADMIN — QUETIAPINE FUMARATE 200 MILLIGRAM(S): 200 TABLET, FILM COATED ORAL at 21:06

## 2021-05-24 RX ADMIN — Medication 650 MILLIGRAM(S): at 21:10

## 2021-05-24 RX ADMIN — BICTEGRAVIR SODIUM, EMTRICITABINE, AND TENOFOVIR ALAFENAMIDE FUMARATE 1 TABLET(S): 30; 120; 15 TABLET ORAL at 12:23

## 2021-05-24 RX ADMIN — Medication 10 MILLIGRAM(S): at 05:25

## 2021-05-24 RX ADMIN — Medication 10 MILLIGRAM(S): at 17:26

## 2021-05-24 RX ADMIN — Medication 1 TABLET(S): at 12:23

## 2021-05-24 RX ADMIN — OXYCODONE HYDROCHLORIDE 5 MILLIGRAM(S): 5 TABLET ORAL at 10:12

## 2021-05-24 RX ADMIN — GABAPENTIN 300 MILLIGRAM(S): 400 CAPSULE ORAL at 12:23

## 2021-05-24 RX ADMIN — HEPARIN SODIUM 5000 UNIT(S): 5000 INJECTION INTRAVENOUS; SUBCUTANEOUS at 21:06

## 2021-05-24 RX ADMIN — OXYCODONE HYDROCHLORIDE 5 MILLIGRAM(S): 5 TABLET ORAL at 10:42

## 2021-05-24 RX ADMIN — GABAPENTIN 300 MILLIGRAM(S): 400 CAPSULE ORAL at 05:25

## 2021-05-24 RX ADMIN — PANTOPRAZOLE SODIUM 40 MILLIGRAM(S): 20 TABLET, DELAYED RELEASE ORAL at 05:26

## 2021-05-24 RX ADMIN — OXYCODONE HYDROCHLORIDE 5 MILLIGRAM(S): 5 TABLET ORAL at 22:23

## 2021-05-24 RX ADMIN — GABAPENTIN 300 MILLIGRAM(S): 400 CAPSULE ORAL at 21:06

## 2021-05-24 NOTE — CONSULT NOTE ADULT - SUBJECTIVE AND OBJECTIVE BOX
Patient is a 31y old  Male who presents with a chief complaint of leg weakness (23 May 2021 11:47)      HPI:  32 y/o male born with HIV ( intrauterine transmission as per pt ) came to the ER as his B/L LE weakness is progressing ( ongoing for years as per pt ). Pt. was seen at [facility] about 2 weeks ago and had mri of C/T/L spine which did not show any acute findings, also had LP done no organism and growth. pt. was discharged. Pt. is homeless and living in a shelter. pt. reports that he walks around with help and holds on to things but not using cane or walker. no new pain in spine area, no fever, no fall reported. pt. is good control of urine and stool. sensations are intact over both lower ext. It appears pt. got frustrated as his B/L LE weakness is not getting better so called ambulance. pt. agrees that there has been no sig. no new weakness in his body, appears needs more structured environment for his activities of daily living and came to the hospital from his shelter. pt. uses marijuna, herion, cocaine , denies IVDA.    As per blood work from May 2nd his viral load was 37, 671. As per pt. he uses his hiv medicine. pt. 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 currently has no sob, no cp, no abd. pain. no n/v/d. no fever.     CT lumbar spine and Head CT Neg.  MRI Lumbar spine Negative.      REVIEW OF SYSTEMS  Constitutional - No fever, No weight loss, No fatigue  HEENT - No eye pain, No visual disturbances, No difficulty hearing, No tinnitus, No vertigo, No neck pain  Respiratory - No cough, No wheezing, No shortness of breath  Cardiovascular - No chest pain, No palpitations  Gastrointestinal - No abdominal pain, No nausea, No vomiting, Continent  Genitourinary - No dysuria, No frequency, No hematuria, No incontinence  Neurological - No headaches, No memory loss, + loss of strength, No numbness, No tremors  Musculoskeletal - +achy  muscle pain  Psychiatric - No depression, No anxiety    PAST MEDICAL & SURGICAL HISTORY  HIV (human immunodeficiency virus infection)    Guillain [place] syndrome    Guillain-[place]    Asthma    HIV (human immunodeficiency virus infection)    CVA (cerebral vascular accident)    Closed fracture of multiple ribs of right side, initial encounter    Cocaine abuse    Chronic sinusitis    Homeless    HIV disease    HIV (human immunodeficiency virus infection)    GBS (Guillain [place] syndrome)    Guillain-[place] syndrome    HIV positive    Stroke    History of orthopedic surgery    No significant past surgical history    No significant past surgical history        SOCIAL HISTORY  Smoking - 1/2 ppd  EtOH - Denied   Drugs - +marijuana-- 5 joints/daily,  +cocaine     FUNCTIONAL HISTORY  Pt. reports he Lives with his parents in  in [facility] with 4 STACEY, no HR and 5 Steps inside.   Reports he walked independently --states was supposed to use a cane but usually didn't;  Independent in ADLs,  Unemployed --on disability since dx of [place] in 2018.;  and +drives   Pt. stated his parents are elderly and cannot assist him at home.  Reports that he is able to stay with his Baby's mother and she will be able to help him get to doctor's appointments, and help him after rehabilitation      Tried calling pt's mother, Diana Garsia, to confirm history but phone number in chart goes to a fax machine.    --After assessment, spoke with SW to clarify if pt. did live at home or shelter.  States that last Friday pt. was tearful and confessed that he was living in a shelter and is ashamed of his living situation so reported that he lives with his parents.    CURRENT FUNCTIONAL STATUS      FAMILY HISTORY   No pertinent family history in first degree relatives    Family history unknown    FH: HIV infection (Mother)        RECENT LABS/IMAGING        VITALS  T(C): 36.7 (05-24-21 @ 11:29), Max: 36.8 (05-23-21 @ 17:43)  HR: 79 (05-24-21 @ 11:29) (68 - 79)  BP: 110/71 (05-24-21 @ 11:29) (110/71 - 145/75)  RR: 18 (05-24-21 @ 11:29) (18 - 18)  SpO2: 99% (05-24-21 @ 11:29) (96% - 100%)  Wt(kg): --    ALLERGIES  Ceclor (Unknown)  Toradol (Anaphylaxis; Swelling)  Toradol (Swelling)      MEDICATIONS   acetaminophen   Tablet .. 650 milliGRAM(s) Oral every 6 hours PRN  baclofen 10 milliGRAM(s) Oral two times a day  bictegravir 50 mG/emtricitabine 200 mG/tenofovir alafenamide 25 mG (BIKTARVY) 1 Tablet(s) Oral daily  gabapentin 300 milliGRAM(s) Oral three times a day  heparin   Injectable 5000 Unit(s) SubCutaneous every 8 hours  multivitamin 1 Tablet(s) Oral daily  oxyCODONE    IR 5 milliGRAM(s) Oral every 12 hours PRN  pantoprazole    Tablet 40 milliGRAM(s) Oral before breakfast  QUEtiapine 200 milliGRAM(s) Oral at bedtime      ----------------------------------------------------------------------------------------  PHYSICAL EXAM  Constitutional - NAD, Comfortable  HEENT - NCAT,  Neck - Supple,   Chest -breathing comfortably on RA  Cardiovascular -well perfused  Abdomen -  Soft, NTND  Extremities - No edema, No calf tenderness   Neurologic Exam -                    Cognitive - Awake, Alert, AAO x3     Communication - Fluent       Recall: 2/3 spontaneously after few minutes, 3/3 with cat. cues.        Attn: intact     Cranial Nerves - EOMI, visual fields intact, no facial weakness, tongue midline     Motor -                    LEFT    UE - ShAB 5/5, EF 5/5, EE 5/5, WE 5/5,  5/5                    RIGHT UE - ShAB 5/5, EF 5/5, EE 5/5, WE 5/5,  5/5                    LEFT    LE - HF 2-3/5, KE 2/5, DF 2/5, PF 3/5                    RIGHT LE - HF 3/5, KE 3/5, DF 3/5, PF 3/5        Sensory - Intact to LT, Proprioception intact     Reflexes - DTR Intact,      Coordination - FTN intact bilat.  HTS impaired.   Psychiatric - Mood stable, Affect WNL  ----------------------------------------------------------------------------------------  ASSESSMENT/PLAN  31 year old Male with HIV-- with bilateral LE weakness of unclear etiology.     GBS, Lumbar myelopathy, and stroke ruled out.          Rehab -   If SW can confirm that patient will have home disposition and family support from pt's significant other or parents available in next few weeks,  ACUTE inpatient rehabilitation will be appropriate.      If home disposition and family support from pt's significant other or parents cannot be confirmed, than BRAYDEN recommended.      --Can consider referral for NCS/EMG for LE weakness evaluation as outpatient after discharge.

## 2021-05-24 NOTE — CHART NOTE - NSCHARTNOTEFT_GEN_A_CORE
--Social work note at 13:38 reviewed.  Since home disposition and support cannot be confirmed, [facility]DEN would be more appropriate.

## 2021-05-25 PROCEDURE — 99232 SBSQ HOSP IP/OBS MODERATE 35: CPT

## 2021-05-25 PROCEDURE — 99233 SBSQ HOSP IP/OBS HIGH 50: CPT

## 2021-05-25 PROCEDURE — 93971 EXTREMITY STUDY: CPT | Mod: 26,RT

## 2021-05-25 RX ORDER — OXYCODONE HYDROCHLORIDE 5 MG/1
5 TABLET ORAL EVERY 12 HOURS
Refills: 0 | Status: DISCONTINUED | OUTPATIENT
Start: 2021-05-25 | End: 2021-05-26

## 2021-05-25 RX ADMIN — HEPARIN SODIUM 5000 UNIT(S): 5000 INJECTION INTRAVENOUS; SUBCUTANEOUS at 05:01

## 2021-05-25 RX ADMIN — Medication 650 MILLIGRAM(S): at 22:49

## 2021-05-25 RX ADMIN — GABAPENTIN 300 MILLIGRAM(S): 400 CAPSULE ORAL at 13:49

## 2021-05-25 RX ADMIN — PANTOPRAZOLE SODIUM 40 MILLIGRAM(S): 20 TABLET, DELAYED RELEASE ORAL at 05:00

## 2021-05-25 RX ADMIN — GABAPENTIN 300 MILLIGRAM(S): 400 CAPSULE ORAL at 21:23

## 2021-05-25 RX ADMIN — HEPARIN SODIUM 5000 UNIT(S): 5000 INJECTION INTRAVENOUS; SUBCUTANEOUS at 13:50

## 2021-05-25 RX ADMIN — QUETIAPINE FUMARATE 200 MILLIGRAM(S): 200 TABLET, FILM COATED ORAL at 21:23

## 2021-05-25 RX ADMIN — GABAPENTIN 300 MILLIGRAM(S): 400 CAPSULE ORAL at 05:00

## 2021-05-25 RX ADMIN — Medication 650 MILLIGRAM(S): at 07:43

## 2021-05-25 RX ADMIN — OXYCODONE HYDROCHLORIDE 5 MILLIGRAM(S): 5 TABLET ORAL at 15:08

## 2021-05-25 RX ADMIN — Medication 10 MILLIGRAM(S): at 05:00

## 2021-05-25 RX ADMIN — Medication 10 MILLIGRAM(S): at 17:04

## 2021-05-25 RX ADMIN — Medication 650 MILLIGRAM(S): at 23:00

## 2021-05-25 RX ADMIN — Medication 1 TABLET(S): at 11:10

## 2021-05-25 RX ADMIN — BICTEGRAVIR SODIUM, EMTRICITABINE, AND TENOFOVIR ALAFENAMIDE FUMARATE 1 TABLET(S): 30; 120; 15 TABLET ORAL at 11:10

## 2021-05-25 NOTE — CHART NOTE - NSCHARTNOTEFT_GEN_A_CORE
Called by RN to assess patient's infiltrated IV in right AC that occurred 2 days ago. Patient seen and examined at bedside. Patient resting comfortably in bed. Pt was complaining of pain and swelling at IV site 2 days ago and pulled out the IV then. Now Pt states that the pain is the same and is worried that his arm will be amputated. Denies numbness or tingling to distal extremity. Pt is A&Ox3, in NAD. Mild swelling is localized to medial right AC. No erythema, tenderness or increased warmth. No fluctuance or streaking. Peripheral pulses are intact, brisk capillary refill, fingers warm and mobile. Full ROM of right UE. UE doppler ordered. Recommend warm compresses and elevation of extremity. Plan discussed with RN and Pt. Will continue to follow, RN to call if any changes in Pt status. Education and reassurance expressed to Patient. Endorsed to night staff for results. Called by RN to assess patient's infiltrated IV in right AC that occurred 2 days ago. Patient seen and examined at bedside. Patient resting comfortably in bed. Pt was complaining of pain and swelling at IV site 2 days ago and pulled out the IV then. Now Pt states that the pain is the same and is worried that his arm will be amputated. Denies numbness or tingling to distal extremity. Pt is A&Ox3, in NAD. Mild swelling is localized to medial right AC. No erythema, tenderness or increased warmth. No fluctuance or streaking. Peripheral pulses are intact, brisk capillary refill, fingers warm and mobile. Full ROM of right UE. RUE doppler ordered. Recommend warm compresses and elevation of extremity. Plan discussed with RN and Pt. Will continue to follow, RN to call if any changes in Pt status. Education and reassurance expressed to Patient. Endorsed to night staff for results.

## 2021-05-25 NOTE — CHART NOTE - NSCHARTNOTEFT_GEN_A_CORE
Duplex Doppler right upper extremity  No evidence of right upper extremity deep venous thrombosis.  Superficial thrombus involving the basilic vein at the level of the antecubital fossa  Warms soaks ordered  Continue to monitor Duplex Doppler right upper extremity  No evidence of right upper extremity deep venous thrombosis.  Superficial thrombus involving the basilic vein at the level of the antecubital fossa  Pt NAD, IV not present right AC at this time  Warms soaks ordered  Dr Fagan notified  Continue to monitor

## 2021-05-26 VITALS
DIASTOLIC BLOOD PRESSURE: 72 MMHG | TEMPERATURE: 98 F | OXYGEN SATURATION: 98 % | RESPIRATION RATE: 18 BRPM | HEART RATE: 90 BPM | SYSTOLIC BLOOD PRESSURE: 119 MMHG

## 2021-05-26 PROCEDURE — 99232 SBSQ HOSP IP/OBS MODERATE 35: CPT

## 2021-05-26 RX ADMIN — BICTEGRAVIR SODIUM, EMTRICITABINE, AND TENOFOVIR ALAFENAMIDE FUMARATE 1 TABLET(S): 30; 120; 15 TABLET ORAL at 11:05

## 2021-05-26 RX ADMIN — Medication 1 TABLET(S): at 11:05

## 2021-05-26 RX ADMIN — GABAPENTIN 300 MILLIGRAM(S): 400 CAPSULE ORAL at 05:43

## 2021-05-26 RX ADMIN — PANTOPRAZOLE SODIUM 40 MILLIGRAM(S): 20 TABLET, DELAYED RELEASE ORAL at 05:43

## 2021-05-26 RX ADMIN — Medication 10 MILLIGRAM(S): at 05:43

## 2021-05-26 NOTE — CHART NOTE - NSCHARTNOTEFT_GEN_A_CORE
Per social work, patient's insurance approved BRAYDEN placement.  Patient told by social work that he was going to leave at 3:30pm.  Patient left the floor.  Patient was discharged pending [facility]DEN approval.

## 2021-05-26 NOTE — PROGRESS NOTE ADULT - PROVIDER SPECIALTY LIST ADULT
Hospitalist
Hospitalist
Rehab Medicine
Rehab Medicine
Hospitalist
Internal Medicine
Hospitalist
Hospitalist

## 2021-05-26 NOTE — PROGRESS NOTE ADULT - ASSESSMENT
30 y/o male born with HIV ( intrauterine transmission as per pt ) came to the ER as his B/L LE weakness is progressing ( ongoing for years as per pt ). Pt. was seen at [facility] about 2 weeks ago and had mri of C/T/L spine which did not show any acute findings, also had LP done no organism and growth. pt. was discharged. Pt. is homeless and living in a shelter. pt. reports that he walks around with help and holds on to things but not using cane or walker. no new pain in spine area, no fever, no fall reported. pt. is good control of urine and stool. sensations are intact over both lower ext. It appears pt. got frustrated as his B/L LE weakness is not getting better so called ambulance. pt. agrees that there has been no sig. no new weakness in his body, appears needs more structured environment for his activities of daily living and came to the hospital from his shelter. pt. uses marijuna, herion, cocaine , denies IVDA.    As per blood work from May 2nd his viral load was 37, 671. As per pt. he uses his hiv medicine. pt. 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 currently has no sob, no cp, no abd. pain. no n/v/d. no fever.      # b/l  Leg weakness- chronic. he complains that it is worsening from baseline  Neuro- nothing more to offer  no exacerbation/ recurrence of GBS  PT rec rehab vs [facility]  c/s Acute rehab  dc plan  [facility]  Neurontin and baclofen continued      # HIV   Cont home antiviral    # Polysubstance abuse  lifestyle modification counselling done    # Hypomagnesemia, Hypokalemia  repleted    DVT : SQ Heparin  kisha [facility] for safe disposition  Plan- Pending [facility] for [facility]  care of plan dw pt. Pt agreed  
30 y/o male born with HIV ( intrauterine transmission as per pt ) came to the ER as his B/L LE weakness is progressing ( ongoing for years as per pt ). Pt. was seen at [facility] about 2 weeks ago and had mri of C/T/L spine which did not show any acute findings, also had LP done no organism and growth. pt. was discharged. Pt. is homeless and living in a shelter. pt. reports that he walks around with help and holds on to things but not using cane or walker. no new pain in spine area, no fever, no fall reported. pt. is good control of urine and stool. sensations are intact over both lower ext. It appears pt. got frustrated as his B/L LE weakness is not getting better so called ambulance. pt. agrees that there has been no sig. no new weakness in his body, appears needs more structured environment for his activities of daily living and came to the hospital from his shelter. pt. uses marijuna, herion, cocaine , denies IVDA.    As per blood work from May 2nd his viral load was 37, 671. As per pt. he uses his hiv medicine. pt. 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 currently has no sob, no cp, no abd. pain. no n/v/d. no fever.      # b/l  Leg weakness- chronic. he complains that it is worsening from baseline  Neuro- nothing more to offer  no exacerbation/ recurrence of GBS  PT rec rehab vs [facility]  c/s Acute rehab- not a candidate for acute rehab  dc plan  [facility]  Neurontin and baclofen continued      # HIV   Cont home antiviral    # Polysubstance abuse  lifestyle modification counselling done    # Hypomagnesemia, Hypokalemia  repleted    DVT : SQ Heparin  kisha [facility]Y for safe disposition  Plan- dc plan to  [facility]  care of plan kisha pt. Pt agreed  
30 y/o male born with HIV ( intrauterine transmission as per pt ) came to the ER as his B/L LE weakness is progressing ( ongoing for years as per pt ). Pt. was seen at [facility] about 2 weeks ago and had mri of C/T/L spine which did not show any acute findings, also had LP done no organism and growth. pt. was discharged. Pt. is homeless and living in a shelter. pt. reports that he walks around with help and holds on to things but not using cane or walker. no new pain in spine area, no fever, no fall reported. pt. is good control of urine and stool. sensations are intact over both lower ext. It appears pt. got frustrated as his B/L LE weakness is not getting better so called ambulance. pt. agrees that there has been no sig. no new weakness in his body, appears needs more structured environment for his activities of daily living and came to the hospital from his shelter. pt. uses marijuna, herion, cocaine , denies IVDA.    As per blood work from May 2nd his viral load was 37, 671. As per pt. he uses his hiv medicine. pt. 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 currently has no sob, no cp, no abd. pain. no n/v/d. no fever.      # b/l  Leg weakness- chronic. he complains that it is worsening from baseline  Neuro- nothing more to offer  no exacerbation/ recurrence of GBS  PT rec rehab vs [facility]  c/s Acute rehab- not a candidate for acute rehab  dc plan  [facility]  Neurontin and baclofen continued      # Rt Superficial thrombus involving the basilic vein at the level of the antecubital fossa  Warm compression      # HIV   Cont home antiviral    # Polysubstance abuse  lifestyle modification counselling done    # Hypomagnesemia, Hypokalemia  repleted    DVT : SQ Heparin  dw [facility] for safe disposition  Plan- dc plan to  [facility]  care of plan kisha pt. Pt agreed  
30 y/o male born with HIV ( intrauterine transmission as per pt ) came to the ER as his B/L LE weakness is progressing ( ongoing for years as per pt ). Pt. was seen at [facility] about 2 weeks ago and had mri of C/T/L spine which did not show any acute findings, also had LP done no organism and growth. pt. was discharged. Pt. is homeless and living in a shelter. pt. reports that he walks around with help and holds on to things but not using cane or walker. no new pain in spine area, no fever, no fall reported. pt. is good control of urine and stool. sensations are intact over both lower ext. It appears pt. got frustrated as his B/L LE weakness is not getting better so called ambulance. pt. agrees that there has been no sig. no new weakness in his body, appears needs more structured environment for his activities of daily living and came to the hospital from his shelter. pt. uses marijuna, herion, cocaine , denies IVDA.    As per blood work from May 2nd his viral load was 37, 671. As per pt. he uses his hiv medicine. pt. 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 currently has no sob, no cp, no abd. pain. no n/v/d. no fever.      # Leg weakness- chr. Since he complains that it is worsening, acute process w/u done. Negative.  Neuro- nothing more to offer  no exacerbation/ recurrence of GBS  poor effort on exam noted  rehab/ log term care facility as it is getting difficult for him to do his activities of daily living (Homeless)  Neurontin and baclofen continued  PT eval pending    # HIV   Cont home antiviral    # Polysubstance abuse  lifestyle modification counselling done    # Hypomagnesemia, Hypokalemia  repleted    Heparin    Plan- PT eval. d/w SW about need to expedite SNF placement
32 y/o male born with HIV ( intrauterine transmission as per pt ) came to the ER as his B/L LE weakness is progressing ( ongoing for years as per pt ). Pt. was seen at [facility] about 2 weeks ago and had mri of C/T/L spine which did not show any acute findings, also had LP done no organism and growth. pt. was discharged. Pt. is homeless and living in a shelter. pt. reports that he walks around with help and holds on to things but not using cane or walker. no new pain in spine area, no fever, no fall reported. pt. is good control of urine and stool. sensations are intact over both lower ext. It appears pt. got frustrated as his B/L LE weakness is not getting better so called ambulance. pt. agrees that there has been no sig. no new weakness in his body, appears needs more structured environment for his activities of daily living and came to the hospital from his shelter. pt. uses marijuna, herion, cocaine , denies IVDA.    As per blood work from May 2nd his viral load was 37, 671. As per pt. he uses his hiv medicine. pt. 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 currently has no sob, no cp, no abd. pain. no n/v/d. no fever.      # b/l  Leg weakness- chronic. he complains that it is worsening  Neuro- nothing more to offer  no exacerbation/ recurrence of GBS    rehab/ log term care facility as it is getting difficult for him to do his activities of daily living (Homeless)  Neurontin and baclofen continued  PT eval pending    # HIV   Cont home antiviral    # Polysubstance abuse  lifestyle modification counselling done    # Hypomagnesemia, Hypokalemia  repleted    DVT : SQ Heparin  SW for safe disposition  Plan- Pending PT eval. 
32 y/o male born with HIV ( intrauterine transmission as per pt ) came to the ER as his B/L LE weakness is progressing ( ongoing for years as per pt ). Pt. was seen at [facility] about 2 weeks ago and had mri of C/T/L spine which did not show any acute findings, also had LP done no organism and growth. pt. was discharged. Pt. is homeless and living in a shelter. pt. reports that he walks around with help and holds on to things but not using cane or walker. no new pain in spine area, no fever, no fall reported. pt. is good control of urine and stool. sensations are intact over both lower ext. It appears pt. got frustrated as his B/L LE weakness is not getting better so called ambulance. pt. agrees that there has been no sig. no new weakness in his body, appears needs more structured environment for his activities of daily living and came to the hospital from his shelter. pt. uses marijuna, herion, cocaine , denies IVDA.    As per blood work from May 2nd his viral load was 37, 671. As per pt. he uses his hiv medicine. pt. 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 currently has no sob, no cp, no abd. pain. no n/v/d. no fever.      # b/l  Leg weakness- chronic. he complains that it is worsening from baseline  Neuro- nothing more to offer  no exacerbation/ recurrence of GBS  PT rec rehab vs [facility]  c/s Acute rehab  dc plan [facility] vs [facility]  Neurontin and baclofen continued      # HIV   Cont home antiviral    # Polysubstance abuse  lifestyle modification counselling done    # Hypomagnesemia, Hypokalemia  repleted    DVT : SQ Heparin  kisha [facility] for safe disposition  Plan- Pending acute rehab eval.  care of plan dw pt. Pt agreed

## 2021-05-26 NOTE — PROGRESS NOTE ADULT - SUBJECTIVE AND OBJECTIVE BOX
Patient feels well this AM.  Reports pain is controlled.  Slept well last night.   Reports cant go home at this time and is planned for rehab.     Reviewed CM note from this morning after visit.    REVIEW OF SYSTEMS  Constitutional - No fever,  +fatigue  Neurological - +loss of strength   Musculoskeletal - No joint pain, No joint swelling, No muscle pain  Psychiatric - No depression, No anxiety    FUNCTIONAL PROGRESS  5/23  Bed Mobility: Sit to Supine:     · Level of Ransom	supervision  · Physical Assist/Nonphysical Assist	supervision; verbal cues    Bed Mobility: Supine to Sit:     · Level of Ransom	supervision  · Physical Assist/Nonphysical Assist	supervision; verbal cues    Bed Mobility Analysis:     · Bed Mobility Limitations	decreased ability to use legs for bridging/pushing  · Impairments Contributing to Impaired Bed Mobility	decreased strength; decreased ROM    Transfer: Sit to Stand:     · Level of Ransom	minimum assist (75% patients effort)  · Physical Assist/Nonphysical Assist	1 person assist; verbal cues  · Assistive Device	rolling walker    Transfer: Stand to Sit:     · Level of Ransom	minimum assist (75% patients effort)  · Physical Assist/Nonphysical Assist	1 person assist; verbal cues  · Assistive Device	rolling walker    Sit/Stand Transfer Safety Analysis:     · Impairments Contributing to Impaired Transfers	impaired balance; decreased strength; narrow base of support    Gait Skills:     · Level of Ransom	minimum assist (75% patients effort)  · Physical Assist/Nonphysical Assist	1 person assist; verbal cues  · Assistive Device	rolling walker  · Gait Distance	7ft    Gait Analysis:     · Gait Pattern Used	3-point gait  · Gait Deviations Noted	decreased cabrera; decreased step length; decreased stride length; decreased heel strike bilaterally  · Impairments Contributing to Gait Deviations	impaired balance; narrow base of support; decreased strength      VITALS  T(C): 36.8 (05-25-21 @ 05:01), Max: 36.8 (05-25-21 @ 05:01)  HR: 69 (05-25-21 @ 05:01) (69 - 79)  BP: 117/66 (05-25-21 @ 05:01) (110/71 - 127/69)  RR: 18 (05-25-21 @ 05:01) (18 - 18)  SpO2: 98% (05-25-21 @ 05:01) (98% - 99%)  Wt(kg): --    MEDICATIONS   acetaminophen   Tablet .. 650 milliGRAM(s) every 6 hours PRN  baclofen 10 milliGRAM(s) two times a day  bictegravir 50 mG/emtricitabine 200 mG/tenofovir alafenamide 25 mG (BIKTARVY) 1 Tablet(s) daily  gabapentin 300 milliGRAM(s) three times a day  heparin   Injectable 5000 Unit(s) every 8 hours  multivitamin 1 Tablet(s) daily  pantoprazole    Tablet 40 milliGRAM(s) before breakfast  QUEtiapine 200 milliGRAM(s) at bedtime      RECENT LABS/IMAGING                CT L SPINE 5/20 - The lumbar lordosis is maintained. No prevertebral soft tissue swelling. Vertebral body heights and alignment are maintained without compression or subluxation. Minimal lower lumbar disc bulging indents the ventral thecal sac, notably at L5-S1, unchanged.    HEAD CT 5/20 - Enlarged nasopharyngeal adenoids. Correlate for infection. No acute intracranial bleeding. Volume loss, greater than expected for patient's age.     MRI L SPINE 5/21 - No significant spinal canal or neuroforaminal narrowing.  ----------------------------------------------------------------------------------------  PHYSICAL EXAM  Constitutional - NAD, Comfortable  Extremities - No edema, No calf tenderness   Neurologic Exam -                    Cognitive - AAOx4     Communication - Fluent, No dysarthria     Cranial Nerves - 2-12 intact     Motor - No focal deficits - effort dependent/limited by participation     Sensory - Intact to LT   Psychiatric - Mood stable, Affect Flat - labile as per other notes  ----------------------------------------------------------------------------------------  ------------------------------------------------------------------------------------------------  ASSESSMENT/PLAN  31yMale with functional deficits related to inconsistent BLE weakness/neuropathy complaints (history of multiple admissions)  HIV - Biktarvy  Neuropathic Pain - Tylenol, Neurontin, Recommend DC Baclofen (not recommended for current complaints)  Mood & Polysubstance Abuse (THC/Cocaine/Opiates) - Seroquel, Recommend Psych given labile behavior/substance abuse counseling  DVT PPX - SCDs, Heparin  Rehab - Given debility diagnosis, psychosocial aspects of patient and multiple previous admissions, patient is expected to achieve functional goals for HOME. If family unable to support and is homeless, can consider shelter assist or [facility]. Patient DOES NOT meet acute inpatient rehabilitation criteria. Patient needs a more prolonged stay to achieve transition to community.     Continue mobilization by staff to maintain cardiopulmonary function and prevention of secondary complications related to debility.     Discussed with rehab team. 
Patient is a 31y old  Male who presents with a chief complaint of leg weakness (21 May 2021 14:04)    Pt seen and exam. No acute weakness,numbness,no pain. c/o fatigue.      REVIEW OF SYSTEMS: All systems are reviewed and found to be negative except above    MEDICATIONS  (STANDING):  baclofen 10 milliGRAM(s) Oral two times a day  bictegravir 50 mG/emtricitabine 200 mG/tenofovir alafenamide 25 mG (BIKTARVY) 1 Tablet(s) Oral daily  gabapentin 300 milliGRAM(s) Oral three times a day  heparin   Injectable 5000 Unit(s) SubCutaneous every 8 hours  multivitamin 1 Tablet(s) Oral daily  pantoprazole    Tablet 40 milliGRAM(s) Oral before breakfast  QUEtiapine 200 milliGRAM(s) Oral at bedtime    MEDICATIONS  (PRN):  acetaminophen   Tablet .. 650 milliGRAM(s) Oral every 6 hours PRN Mild Pain (1 - 3), Moderate Pain (4 - 6)  oxyCODONE    IR 5 milliGRAM(s) Oral every 12 hours PRN Severe Pain (7 - 10)      CAPILLARY BLOOD GLUCOSE        I&O's Summary      PHYSICAL EXAM:  Vital Signs Last 24 Hrs  T(C): 36.7 (22 May 2021 10:25), Max: 36.7 (22 May 2021 10:25)  T(F): 98 (22 May 2021 10:25), Max: 98 (22 May 2021 10:25)  HR: 65 (22 May 2021 10:25) (65 - 76)  BP: 119/70 (22 May 2021 10:25) (111/66 - 123/71)  BP(mean): --  RR: 18 (22 May 2021 10:25) (18 - 20)  SpO2: 99% (22 May 2021 10:25) (96% - 100%)    CONSTITUTIONAL: NAD,  EYES: PERRLA; conjunctiva and sclera clear  ENMT: Moist oral mucosa,   RESPIRATORY: Normal respiratory effort; lungs are clear to auscultation bilaterally  CARDIOVASCULAR: Regular rate and rhythm, normal S1 and S2, no murmur   EXTS: No lower extremity edema; Peripheral pulses are 2+ bilaterally  ABDOMEN: Nontender to palpation, normoactive bowel sounds, no rebound/guarding;   MUSCLOSKELETAL; no joint swelling or tenderness to palpation  PSYCH: coop  NEUROLOGY: A+O to person, place, and time; CN 2-12 are intact and symmetric; no focal  deficits;       LABS:                        12.1   5.45  )-----------( 242      ( 20 May 2021 18:02 )             37.4         138  |  106  |  10.0  ----------------------------<  83  4.4   |  23.0  |  0.79    Ca    9.1      22 May 2021 10:28  Phos  2.8       Mg     1.9         TPro  7.9  /  Alb  4.4  /  TBili  0.8  /  DBili  x   /  AST  25  /  ALT  14  /  AlkPhos  73  -20    PT/INR - ( 21 May 2021 02:16 )   PT: 13.6 sec;   INR: 1.18 ratio         PTT - ( 21 May 2021 02:16 )  PTT:31.1 sec  CARDIAC MARKERS ( 20 May 2021 18:02 )  x     / x     / 218 U/L / x     / 1.8 ng/mL      Urinalysis Basic - ( 20 May 2021 19:10 )    Color: Yellow / Appearance: Clear / S.025 / pH: x  Gluc: x / Ketone: Trace  / Bili: Negative / Urobili: 1 mg/dL   Blood: x / Protein: 15 mg/dL / Nitrite: Negative   Leuk Esterase: Trace / RBC: 0-2 /HPF / WBC 0-2   Sq Epi: x / Non Sq Epi: Occasional / Bacteria: Occasional          RADIOLOGY & ADDITIONAL TESTS:  Results Reviewed:   Imaging Personally Reviewed:  Electrocardiogram Personally Reviewed:    COORDINATION OF CARE:  Care Discussed with Consultants/Other Providers [Y/N]:  Prior or Outpatient Records Reviewed [Y/N]:  
Patient is a 31y old  Male who presents with a chief complaint of leg weakness (24 May 2021 13:42)    No acute issues    REVIEW OF SYSTEMS: All systems are reviewed and found to be negative except above    MEDICATIONS  (STANDING):  baclofen 10 milliGRAM(s) Oral two times a day  bictegravir 50 mG/emtricitabine 200 mG/tenofovir alafenamide 25 mG (BIKTARVY) 1 Tablet(s) Oral daily  gabapentin 300 milliGRAM(s) Oral three times a day  heparin   Injectable 5000 Unit(s) SubCutaneous every 8 hours  multivitamin 1 Tablet(s) Oral daily  pantoprazole    Tablet 40 milliGRAM(s) Oral before breakfast  QUEtiapine 200 milliGRAM(s) Oral at bedtime    MEDICATIONS  (PRN):  acetaminophen   Tablet .. 650 milliGRAM(s) Oral every 6 hours PRN Mild Pain (1 - 3), Moderate Pain (4 - 6)      CAPILLARY BLOOD GLUCOSE        I&O's Summary      PHYSICAL EXAM:  Vital Signs Last 24 Hrs  T(C): 36.8 (25 May 2021 11:19), Max: 36.8 (25 May 2021 05:01)  T(F): 98.2 (25 May 2021 11:19), Max: 98.3 (25 May 2021 05:01)  HR: 98 (25 May 2021 11:19) (69 - 98)  BP: 130/68 (25 May 2021 11:19) (117/66 - 130/68)  BP(mean): --  RR: 18 (25 May 2021 11:19) (18 - 18)  SpO2: 97% (25 May 2021 11:19) (97% - 99%)    CONSTITUTIONAL: NAD,  EYES: PERRLA; conjunctiva and sclera clear  ENMT: Moist oral mucosa,   RESPIRATORY: Normal respiratory effort; lungs are clear to auscultation bilaterally  CARDIOVASCULAR: Regular rate and rhythm, normal S1 and S2, no murmur   EXTS: No lower extremity edema; Peripheral pulses are 2+ bilaterally  ABDOMEN: Nontender to palpation, normoactive bowel sounds, no rebound/guarding;   MUSCLOSKELETAL:   no joint swelling or tenderness to palpation  PSYCH: affect appropriate  NEUROLOGY: A+O to person, place, and time.  no gross  deficits;       LABS:                      RADIOLOGY & ADDITIONAL TESTS:  Results Reviewed:   Imaging Personally Reviewed:  Electrocardiogram Personally Reviewed:    COORDINATION OF CARE:  Care Discussed with Consultants/Other Providers [Y/N]:  Prior or Outpatient Records Reviewed [Y/N]:  
Subj:  c/o mild to moderate tooth pain,  no LBP or LE pain.  had infiltrated IV yesterday-- no arm pain now.  Doppler neg.      SOCIAL HISTORY  Smoking - 1/2 ppd  EtOH - Denied   Drugs - +marijuana-- 5 joints/daily,  +cocaine     FUNCTIONAL HISTORY  Pt. reports he Lives with his parents in  in [facility] with 4 STACEY, no HR and 5 Steps inside.   Reports he walked independently --states was supposed to use a cane but usually didn't;  Independent in ADLs,  Unemployed --on disability since dx of [place]t in 2018.;  and +drives   Pt. stated his parents are elderly and cannot assist him at home.  Reports that he is able to stay with his Baby's mother and she will be able to help him get to doctor's appointments, and help him after rehabilitation      Tried calling pt's mother, Diana Garsia, to confirm history but phone number in chart goes to a fax machine.    --After initial assessment, spoke with SW to clarify if pt. did live at home or shelter.  States that last Friday pt. was tearful and confessed that he was living in a shelter and is ashamed of his living situation so reported that he lives with his parents.  SW unable to verify pt. will have home discharge disposition--pt. refused to allow SW to contact family    CURRENT FUNCTIONAL STATUS        REVIEW OF SYSTEMS  Constitutional - No fever, No weight loss, No fatigue  HEENT - No eye pain, No visual disturbances, No difficulty hearing, No tinnitus, No vertigo, No neck pain  Neurological - No headaches, No memory loss, +loss of strength, No numbness, No tremors   Musculoskeletal - No joint pain, No joint swelling, No muscle pain  Psychiatric - No depression, No anxiety    RECENT LABS/IMAGING              VITALS  T(C): 36.7 (05-26-21 @ 04:40), Max: 36.9 (05-25-21 @ 18:45)  HR: 75 (05-26-21 @ 04:40) (75 - 98)  BP: 114/64 (05-26-21 @ 04:40) (114/64 - 138/83)  RR: 18 (05-26-21 @ 04:40) (18 - 18)  SpO2: 97% (05-26-21 @ 04:40) (95% - 97%)  Wt(kg): --    MEDICATIONS   acetaminophen   Tablet .. 650 milliGRAM(s) every 6 hours PRN  baclofen 10 milliGRAM(s) two times a day  bictegravir 50 mG/emtricitabine 200 mG/tenofovir alafenamide 25 mG (BIKTARVY) 1 Tablet(s) daily  gabapentin 300 milliGRAM(s) three times a day  heparin   Injectable 5000 Unit(s) every 8 hours  multivitamin 1 Tablet(s) daily  oxyCODONE    IR 5 milliGRAM(s) every 12 hours PRN  pantoprazole    Tablet 40 milliGRAM(s) before breakfast  QUEtiapine 200 milliGRAM(s) at bedtime      --------------------------------------------------------------------  Physical Exam  Gen: NAD  HEENT: NCAT  Resp: Breathing comfortably on RA  Cardio: warm, well perfused  Abd:  Soft, NT  Extr: no edema,  no calf tenderness    Neuro:    Cogn:  AAOx 3  Recall: good  Atten: good  Communication: fluent  CN: PERRLA, EOMI, Visual fields intact, no facial asymmetry or sensory impairment, tongue midline  Motor: 5/5 bilat. UEs,    LE-- Right LE 4/5;         Left LE-- hip 3/5, knee flexion/extension-- 4/5, DF/PF-- 4/5  Sens: intact  Coordination: intact FNF, impaired HTS on left,  intact on right  Mood: Affect appropriate, stable    ASSESSMENT/PLAN  31 year old Male with HIV-- with bilateral LE weakness of unclear etiology.     Rehab -   Since home disposition and family support from pt's significant other or parents cannot be confirmed,  [facility] recommended.  Expected that patient will need more than short term stay in Acute rehabilitation for community independence.    SW awaiting authorization for [facility]--    Pain control -- cont. meds as per primary team-- pt. on neurontin and baclofen     Psych-- Seroquel.     
Patient is a 31y old  Male who presents with a chief complaint of leg weakness (26 May 2021 10:49)    No acute issues. Rt UE mild swelling,per pt no pain,  No sob,cp.    SUBJECTIVE / OVERNIGHT EVENTS:  REVIEW OF SYSTEMS: All systems are reviewed and found to be negative except above    MEDICATIONS  (STANDING):  baclofen 10 milliGRAM(s) Oral two times a day  bictegravir 50 mG/emtricitabine 200 mG/tenofovir alafenamide 25 mG (BIKTARVY) 1 Tablet(s) Oral daily  gabapentin 300 milliGRAM(s) Oral three times a day  heparin   Injectable 5000 Unit(s) SubCutaneous every 8 hours  multivitamin 1 Tablet(s) Oral daily  pantoprazole    Tablet 40 milliGRAM(s) Oral before breakfast  QUEtiapine 200 milliGRAM(s) Oral at bedtime    MEDICATIONS  (PRN):  acetaminophen   Tablet .. 650 milliGRAM(s) Oral every 6 hours PRN Mild Pain (1 - 3), Moderate Pain (4 - 6)  oxyCODONE    IR 5 milliGRAM(s) Oral every 12 hours PRN Severe Pain (7 - 10)      CAPILLARY BLOOD GLUCOSE        I&O's Summary    25 May 2021 07:01  -  26 May 2021 07:00  --------------------------------------------------------  IN: 0 mL / OUT: 1200 mL / NET: -1200 mL        PHYSICAL EXAM:  Vital Signs Last 24 Hrs  T(C): 36.6 (26 May 2021 11:00), Max: 36.9 (25 May 2021 18:45)  T(F): 97.8 (26 May 2021 11:00), Max: 98.4 (25 May 2021 18:45)  HR: 90 (26 May 2021 11:00) (75 - 90)  BP: 119/72 (26 May 2021 11:00) (114/64 - 138/83)  BP(mean): --  RR: 18 (26 May 2021 11:00) (18 - 18)  SpO2: 98% (26 May 2021 11:00) (95% - 98%)    CONSTITUTIONAL: NAD,  EYES: PERRLA; conjunctiva and sclera clear  ENMT: Moist oral mucosa,   RESPIRATORY: Normal respiratory effort; lungs are clear to auscultation bilaterally  CARDIOVASCULAR: Regular rate and rhythm, normal S1 and S2, no murmur   EXTS: No lower extremity edema; Peripheral pulses are 2+ bilaterally. mild swelling rt forearm.non tender  ABDOMEN: Nontender to palpation, normoactive bowel sounds, no rebound/guarding;   MUSCLOSKELETAL:  no joint swelling or tenderness to palpation  PSYCH: affect appropriate  NEUROLOGY: A+O to person, place, and time;  no gross deficits;       LABS:                      RADIOLOGY & ADDITIONAL TESTS:  Results Reviewed:   Imaging Personally Reviewed:  Electrocardiogram Personally Reviewed:    COORDINATION OF CARE:  Care Discussed with Consultants/Other Providers [Y/N]:  Prior or Outpatient Records Reviewed [Y/N]:  
HEALTH ISSUES - PROBLEM Dx:    chr leg weakness    INTERVAL HPI/ OVERNIGHT EVENTS:    now agreeable to go to Rehab/ [facility]TP    REVIEW OF SYSTEMS:    as above    Vital Signs Last 24 Hrs  T(C): 36.7 (21 May 2021 11:30), Max: 36.7 (21 May 2021 11:30)  T(F): 98 (21 May 2021 11:30), Max: 98 (21 May 2021 11:30)  HR: 83 (21 May 2021 11:30) (61 - 111)  BP: 115/60 (21 May 2021 11:30) (104/64 - 134/90)  BP(mean): --  RR: 20 (21 May 2021 11:30) (18 - 20)  SpO2: 98% (21 May 2021 11:30) (96% - 100%)    PHYSICAL EXAM-  General: AA male lying in bed not in distress.  HEENT: AT, NC. PERRL. intact EOM. no throat erythema or exudate.   Neck: supple. no JVD.   Chest: CTA bilaterally  Heart: S1,S2. RRR. no heart murmur. no edema.  Abdomen: soft. non-tender. non-distended. + BS.   spine : no pain to palpation over entire spine area, no warmth over spine appreciated.  Ext: no calf tenderness on either side, negative SLR B/L. sensory intact B/L. distal pulses intact.   Neuro: AAO x3. baseline motor deficit of B/L LE, chronic, no speech disorder, 1 + DTR lower ext.   Skin: no obvious rash noted, dry skin over B/L feet. warm and dry skin.  psych : no agitation, co-operative. no si/hi        MEDICATIONS  (STANDING):  baclofen 10 milliGRAM(s) Oral two times a day  bictegravir 50 mG/emtricitabine 200 mG/tenofovir alafenamide 25 mG (BIKTARVY) 1 Tablet(s) Oral daily  gabapentin 300 milliGRAM(s) Oral three times a day  heparin   Injectable 5000 Unit(s) SubCutaneous every 8 hours  multivitamin 1 Tablet(s) Oral daily  pantoprazole    Tablet 40 milliGRAM(s) Oral before breakfast  QUEtiapine 200 milliGRAM(s) Oral at bedtime    MEDICATIONS  (PRN):  acetaminophen   Tablet .. 650 milliGRAM(s) Oral every 6 hours PRN Mild Pain (1 - 3), Moderate Pain (4 - 6)  oxyCODONE    IR 5 milliGRAM(s) Oral every 12 hours PRN Severe Pain (7 - 10)      LABS:                        12.1   5.45  )-----------( 242      ( 20 May 2021 18:02 )             37.4         135  |  102  |  15.0  ----------------------------<  90  3.4<L>   |  23.0  |  0.81    Ca    8.6      21 May 2021 02:16  Mg     1.5         TPro  7.9  /  Alb  4.4  /  TBili  0.8  /  DBili  x   /  AST  25  /  ALT  14  /  AlkPhos  73  05-20    PT/INR - ( 21 May 2021 02:16 )   PT: 13.6 sec;   INR: 1.18 ratio         PTT - ( 21 May 2021 02:16 )  PTT:31.1 sec  Urinalysis Basic - ( 20 May 2021 19:10 )    Color: Yellow / Appearance: Clear / S.025 / pH: x  Gluc: x / Ketone: Trace  / Bili: Negative / Urobili: 1 mg/dL   Blood: x / Protein: 15 mg/dL / Nitrite: Negative   Leuk Esterase: Trace / RBC: 0-2 /HPF / WBC 0-2   Sq Epi: x / Non Sq Epi: Occasional / Bacteria: Occasional    
Patient is a 31y old  Male who presents with a chief complaint of leg weakness (22 May 2021 12:05)    Pt seen and exam. c/o gen weakness from his baseline but feeling little better.      REVIEW OF SYSTEMS: All systems are reviewed and found to be negative except above    MEDICATIONS  (STANDING):  baclofen 10 milliGRAM(s) Oral two times a day  bictegravir 50 mG/emtricitabine 200 mG/tenofovir alafenamide 25 mG (BIKTARVY) 1 Tablet(s) Oral daily  gabapentin 300 milliGRAM(s) Oral three times a day  heparin   Injectable 5000 Unit(s) SubCutaneous every 8 hours  multivitamin 1 Tablet(s) Oral daily  pantoprazole    Tablet 40 milliGRAM(s) Oral before breakfast  QUEtiapine 200 milliGRAM(s) Oral at bedtime    MEDICATIONS  (PRN):  acetaminophen   Tablet .. 650 milliGRAM(s) Oral every 6 hours PRN Mild Pain (1 - 3), Moderate Pain (4 - 6)  oxyCODONE    IR 5 milliGRAM(s) Oral every 12 hours PRN Severe Pain (7 - 10)      CAPILLARY BLOOD GLUCOSE        I&O's Summary      PHYSICAL EXAM:  Vital Signs Last 24 Hrs  T(C): 36.6 (23 May 2021 11:38), Max: 36.6 (22 May 2021 18:21)  T(F): 97.9 (23 May 2021 11:38), Max: 97.9 (22 May 2021 18:21)  HR: 75 (23 May 2021 11:38) (75 - 78)  BP: 115/61 (23 May 2021 11:38) (115/61 - 116/71)  BP(mean): --  RR: 18 (23 May 2021 11:38) (18 - 18)  SpO2: 99% (23 May 2021 11:38) (98% - 99%)    CONSTITUTIONAL: NAD,  EYES: PERRLA; conjunctiva and sclera clear  ENMT: Moist oral mucosa,   RESPIRATORY: Normal respiratory effort; lungs are clear to auscultation bilaterally  CARDIOVASCULAR: Regular rate and rhythm, normal S1 and S2, no murmur   EXTS: No lower extremity edema; Peripheral pulses are 2+ bilaterally  ABDOMEN: Nontender to palpation, normoactive bowel sounds, no rebound/guarding;   MUSCLOSKELETAL:   no clubbing or cyanosis of digits; no joint swelling or tenderness to palpation  PSYCH: affect appropriate  NEUROLOGY: A+O to person, place, and time; CN 2-12 are intact and symmetric; no gross sensory deficits;       LABS:    05-22    138  |  106  |  10.0  ----------------------------<  83  4.4   |  23.0  |  0.79    Ca    9.1      22 May 2021 10:28  Phos  2.8     05-22  Mg     1.9     05-22                Culture - Blood (collected 20 May 2021 18:01)  Source: .Blood Blood  Preliminary Report (22 May 2021 19:01):    No growth at 48 hours    Culture - Blood (collected 20 May 2021 17:59)  Source: .Blood Blood  Preliminary Report (22 May 2021 19:01):    No growth at 48 hours        RADIOLOGY & ADDITIONAL TESTS:  Results Reviewed:   Imaging Personally Reviewed:  Electrocardiogram Personally Reviewed:    COORDINATION OF CARE:  Care Discussed with Consultants/Other Providers [Y/N]:  Prior or Outpatient Records Reviewed [Y/N]:  
Patient is a 31y old  Male who presents with a chief complaint of leg weakness (24 May 2021 12:36)    pt seen and exam. No acute issues except fatigue.  no pain      REVIEW OF SYSTEMS: All systems are reviewed and found to be negative except above    MEDICATIONS  (STANDING):  baclofen 10 milliGRAM(s) Oral two times a day  bictegravir 50 mG/emtricitabine 200 mG/tenofovir alafenamide 25 mG (BIKTARVY) 1 Tablet(s) Oral daily  gabapentin 300 milliGRAM(s) Oral three times a day  heparin   Injectable 5000 Unit(s) SubCutaneous every 8 hours  multivitamin 1 Tablet(s) Oral daily  pantoprazole    Tablet 40 milliGRAM(s) Oral before breakfast  QUEtiapine 200 milliGRAM(s) Oral at bedtime    MEDICATIONS  (PRN):  acetaminophen   Tablet .. 650 milliGRAM(s) Oral every 6 hours PRN Mild Pain (1 - 3), Moderate Pain (4 - 6)  oxyCODONE    IR 5 milliGRAM(s) Oral every 12 hours PRN Severe Pain (7 - 10)      CAPILLARY BLOOD GLUCOSE        I&O's Summary      PHYSICAL EXAM:  Vital Signs Last 24 Hrs  T(C): 36.7 (24 May 2021 11:29), Max: 36.8 (23 May 2021 17:43)  T(F): 98.1 (24 May 2021 11:29), Max: 98.3 (23 May 2021 17:43)  HR: 79 (24 May 2021 11:29) (68 - 79)  BP: 110/71 (24 May 2021 11:29) (110/71 - 145/75)  BP(mean): --  RR: 18 (24 May 2021 11:29) (18 - 18)  SpO2: 99% (24 May 2021 11:29) (96% - 100%)    CONSTITUTIONAL: NAD,  EYES: PERRLA; conjunctiva and sclera clear  ENMT: Moist oral mucosa,   RESPIRATORY: Normal respiratory effort; lungs are clear to auscultation bilaterally  CARDIOVASCULAR: Regular rate and rhythm, normal S1 and S2, no murmur   EXTS: No lower extremity edema; Peripheral pulses are 2+ bilaterally  ABDOMEN: Nontender to palpation, normoactive bowel sounds, no rebound/guarding;   MUSCLOSKELETAL:  no joint swelling or tenderness to palpation  PSYCH: coop  NEUROLOGY: A+O to person, place, and time;  no focal neuro  deficits;       LABS:                      RADIOLOGY & ADDITIONAL TESTS:  Results Reviewed:   Imaging Personally Reviewed:  Electrocardiogram Personally Reviewed:    COORDINATION OF CARE:  Care Discussed with Consultants/Other Providers [Y/N]:  Prior or Outpatient Records Reviewed [Y/N]:

## 2021-05-30 ENCOUNTER — EMERGENCY (EMERGENCY)
Facility: HOSPITAL | Age: 31
LOS: 1 days | Discharge: HOME | End: 2021-05-30
Attending: EMERGENCY MEDICINE
Payer: COMMERCIAL

## 2021-05-30 VITALS
RESPIRATION RATE: 20 BRPM | HEART RATE: 77 BPM | DIASTOLIC BLOOD PRESSURE: 84 MMHG | TEMPERATURE: 99 F | HEIGHT: 71 IN | OXYGEN SATURATION: 98 % | SYSTOLIC BLOOD PRESSURE: 133 MMHG

## 2021-05-30 DIAGNOSIS — Z98.890 OTHER SPECIFIED POSTPROCEDURAL STATES: Chronic | ICD-10-CM

## 2021-05-30 LAB
ALBUMIN SERPL ELPH-MCNC: 4.3 G/DL — SIGNIFICANT CHANGE UP (ref 3.3–5.2)
ALP SERPL-CCNC: 79 U/L — SIGNIFICANT CHANGE UP (ref 40–120)
ALT FLD-CCNC: 17 U/L — SIGNIFICANT CHANGE UP
ANION GAP SERPL CALC-SCNC: 10 MMOL/L — SIGNIFICANT CHANGE UP (ref 5–17)
APTT BLD: 22.5 SEC — LOW (ref 27.5–35.5)
AST SERPL-CCNC: 32 U/L — SIGNIFICANT CHANGE UP
BASOPHILS # BLD AUTO: 0.02 K/UL — SIGNIFICANT CHANGE UP (ref 0–0.2)
BASOPHILS NFR BLD AUTO: 0.3 % — SIGNIFICANT CHANGE UP (ref 0–2)
BILIRUB SERPL-MCNC: 0.7 MG/DL — SIGNIFICANT CHANGE UP (ref 0.4–2)
BUN SERPL-MCNC: 14 MG/DL — SIGNIFICANT CHANGE UP (ref 8–20)
CALCIUM SERPL-MCNC: 9.5 MG/DL — SIGNIFICANT CHANGE UP (ref 8.6–10.2)
CHLORIDE SERPL-SCNC: 102 MMOL/L — SIGNIFICANT CHANGE UP (ref 98–107)
CO2 SERPL-SCNC: 24 MMOL/L — SIGNIFICANT CHANGE UP (ref 22–29)
CREAT SERPL-MCNC: 0.79 MG/DL — SIGNIFICANT CHANGE UP (ref 0.5–1.3)
EOSINOPHIL # BLD AUTO: 0.2 K/UL — SIGNIFICANT CHANGE UP (ref 0–0.5)
EOSINOPHIL NFR BLD AUTO: 2.5 % — SIGNIFICANT CHANGE UP (ref 0–6)
GLUCOSE SERPL-MCNC: 82 MG/DL — SIGNIFICANT CHANGE UP (ref 70–99)
HCT VFR BLD CALC: 37.5 % — LOW (ref 39–50)
HGB BLD-MCNC: 12 G/DL — LOW (ref 13–17)
IMM GRANULOCYTES NFR BLD AUTO: 0.3 % — SIGNIFICANT CHANGE UP (ref 0–1.5)
INR BLD: 1.25 RATIO — HIGH (ref 0.88–1.16)
LYMPHOCYTES # BLD AUTO: 2.39 K/UL — SIGNIFICANT CHANGE UP (ref 1–3.3)
LYMPHOCYTES # BLD AUTO: 30 % — SIGNIFICANT CHANGE UP (ref 13–44)
MCHC RBC-ENTMCNC: 28.9 PG — SIGNIFICANT CHANGE UP (ref 27–34)
MCHC RBC-ENTMCNC: 32 GM/DL — SIGNIFICANT CHANGE UP (ref 32–36)
MCV RBC AUTO: 90.4 FL — SIGNIFICANT CHANGE UP (ref 80–100)
MONOCYTES # BLD AUTO: 0.63 K/UL — SIGNIFICANT CHANGE UP (ref 0–0.9)
MONOCYTES NFR BLD AUTO: 7.9 % — SIGNIFICANT CHANGE UP (ref 2–14)
NEUTROPHILS # BLD AUTO: 4.7 K/UL — SIGNIFICANT CHANGE UP (ref 1.8–7.4)
NEUTROPHILS NFR BLD AUTO: 59 % — SIGNIFICANT CHANGE UP (ref 43–77)
PLATELET # BLD AUTO: 274 K/UL — SIGNIFICANT CHANGE UP (ref 150–400)
POTASSIUM SERPL-MCNC: 4.8 MMOL/L — SIGNIFICANT CHANGE UP (ref 3.5–5.3)
POTASSIUM SERPL-SCNC: 4.8 MMOL/L — SIGNIFICANT CHANGE UP (ref 3.5–5.3)
PROT SERPL-MCNC: 8.2 G/DL — SIGNIFICANT CHANGE UP (ref 6.6–8.7)
PROTHROM AB SERPL-ACNC: 14.4 SEC — HIGH (ref 10.6–13.6)
RBC # BLD: 4.15 M/UL — LOW (ref 4.2–5.8)
RBC # FLD: 13.1 % — SIGNIFICANT CHANGE UP (ref 10.3–14.5)
SODIUM SERPL-SCNC: 136 MMOL/L — SIGNIFICANT CHANGE UP (ref 135–145)
TROPONIN T SERPL-MCNC: <0.01 NG/ML — SIGNIFICANT CHANGE UP (ref 0–0.06)
WBC # BLD: 7.96 K/UL — SIGNIFICANT CHANGE UP (ref 3.8–10.5)
WBC # FLD AUTO: 7.96 K/UL — SIGNIFICANT CHANGE UP (ref 3.8–10.5)

## 2021-05-30 PROCEDURE — 85730 THROMBOPLASTIN TIME PARTIAL: CPT

## 2021-05-30 PROCEDURE — 85610 PROTHROMBIN TIME: CPT

## 2021-05-30 PROCEDURE — 84484 ASSAY OF TROPONIN QUANT: CPT

## 2021-05-30 PROCEDURE — 93005 ELECTROCARDIOGRAM TRACING: CPT

## 2021-05-30 PROCEDURE — 93010 ELECTROCARDIOGRAM REPORT: CPT

## 2021-05-30 PROCEDURE — 99284 EMERGENCY DEPT VISIT MOD MDM: CPT | Mod: 25

## 2021-05-30 PROCEDURE — 99285 EMERGENCY DEPT VISIT HI MDM: CPT

## 2021-05-30 PROCEDURE — 71045 X-RAY EXAM CHEST 1 VIEW: CPT | Mod: 26

## 2021-05-30 PROCEDURE — 80053 COMPREHEN METABOLIC PANEL: CPT

## 2021-05-30 PROCEDURE — 85025 COMPLETE CBC W/AUTO DIFF WBC: CPT

## 2021-05-30 PROCEDURE — 71045 X-RAY EXAM CHEST 1 VIEW: CPT

## 2021-05-30 PROCEDURE — 36415 COLL VENOUS BLD VENIPUNCTURE: CPT

## 2021-05-30 NOTE — ED ADULT NURSE NOTE - PMH
Asthma    Chronic sinusitis    Closed fracture of multiple ribs of right side, initial encounter    Cocaine abuse    CVA (cerebral vascular accident)    GBS (Guillain Packwood syndrome)    Guillain-Packwood    Guillain-Packwood syndrome    HIV (human immunodeficiency virus infection)  from birth  HIV (human immunodeficiency virus infection)    HIV disease    HIV positive    Homeless    Stroke

## 2021-05-30 NOTE — ED PROVIDER NOTE - NSFOLLOWUPINSTRUCTIONS_ED_ALL_ED_FT
Follow up with your Cardiologist within 1 week   Copy of results printed and provided to you     Chest Pain    Chest pain can be caused by many different conditions which may or may not be dangerous. Causes include heartburn, lung infections, heart attack, blood clot in lungs, skin infections, strain or damage to muscle, cartilage, or bones, etc. In addition to a history and physical examination, an electrocardiogram (ECG) or other lab tests may have been performed to determine the cause of your chest pain. Follow up with your primary care provider or with a cardiologist as instructed.     SEEK IMMEDIATE MEDICAL CARE IF YOU HAVE ANY OF THE FOLLOWING SYMPTOMS: worsening chest pain, coughing up blood, unexplained back/neck/jaw pain, severe abdominal pain, dizziness or lightheadedness, fainting, shortness of breath, sweaty or clammy skin, vomiting, or racing heart beat. These symptoms may represent a serious problem that is an emergency. Do not wait to see if the symptoms will go away. Get medical help right away. Call 911 and do not drive yourself to the hospital.

## 2021-05-30 NOTE — ED PROVIDER NOTE - OBJECTIVE STATEMENT
31 year old male with PMhx HIV, (last viral load 2 months ago undetectable, CD4 count 380), current smoker, stroke, A fib (No AC) and GB syndrome presents to the ED for chest pain which started this evening about 1 hour ago. Pain is midsternal, non-radiating. Worse with exertion. Also reports associated dizziness and mild SOB. Denies palpitations, back pain, sweats, vomiting. Pt follows with Cardiologist in [place], was seen 2 months ago and was told he had a normal stress test and echo. Smokes marijuana occasionally but denies any other illicit drug use. 31 year old male with PMhx HIV, (last viral load 2 months ago undetectable, CD4 count 380), current smoker, stroke, A fib (No AC) and GB syndrome presents to the ED for chest pain which started this evening about 1 hour ago. Pain is midsternal, non-radiating. Worse with exertion. Also reports associated dizziness and mild SOB. Denies palpitations, back pain, sweats, vomiting. Pt follows with Cardiologist in [place]; had a normal stress test and echo 1 month ago. Smokes marijuana occasionally but denies any other illicit drug use.

## 2021-05-30 NOTE — ED ADULT NURSE NOTE - OBJECTIVE STATEMENT
Pt AAOX3, pt c/o chest pain started this evening about 1 hour ago. Pain is midsternal, non-radiating. Worse with exertion. Also reports associated dizziness and mild SOB. Denies palpitations, back pain, sweats, vomiting. pt respirations even and unlabored, pt able to move all extremities well

## 2021-05-30 NOTE — ED PROVIDER NOTE - PATIENT PORTAL LINK FT
You can access the FollowMyHealth Patient Portal offered by [facility] by registering at the following website: [email or web address]. By joining [facility]’s FollowMyHealth portal, you will also be able to view your health information using other applications (apps) compatible with our system.

## 2021-05-30 NOTE — ED ADULT NURSE NOTE - NSIMPLEMENTINTERV_GEN_ALL_ED
Implemented All Universal Safety Interventions:  [place] to call system. Call bell, personal items and telephone within reach. Instruct patient to call for assistance. Room bathroom lighting operational. Non-slip footwear when patient is off stretcher. Physically safe environment: no spills, clutter or unnecessary equipment. Stretcher in lowest position, wheels locked, appropriate side rails in place.

## 2021-05-30 NOTE — ED ADULT TRIAGE NOTE - CHIEF COMPLAINT QUOTE
pt BIBA c/o chest pain started 40 min ago while he was on the train. hx of afib, stroke and HIV. pt aao x4. respirations even and unlabored. ekg done. appears uncomfortable.

## 2021-05-30 NOTE — ED PROVIDER NOTE - ATTENDING CONTRIBUTION TO CARE
I, Rodrigo Moore, performed the initial face to face bedside interview with this patient regarding history of present illness, review of symptoms and relevant past medical, social and family history.  I completed an independent physical examination.  I was the initial provider who evaluated this patient. I have signed out the follow up of any pending tests (i.e. labs, radiological studies) to the ACP.  I have communicated the patient’s plan of care and disposition with the ACP.     Young adult male with multiple medical problems and comorbidities, p/w atypical chest pain; on exam, NAD, non toxic; no jvd; normal heart and lung sounds; abd soft nt nd; no pedal edema; labs including troponin, ecg, and cxr unremarkable, along with reassuring exam and atypucal story; ok for d/c with outpt f/u I, Rodrigo Moore, performed the initial face to face bedside interview with this patient regarding history of present illness, review of symptoms and relevant past medical, social and family history.  I completed an independent physical examination.  I was the initial provider who evaluated this patient. I have signed out the follow up of any pending tests (i.e. labs, radiological studies) to the ACP.  I have communicated the patient’s plan of care and disposition with the ACP.     Young adult male with multiple medical problems and comorbidities, p/w atypical chest pain; hx of negative cardiac workup including nuclear stress test in april of this year (see EMR); on exam, NAD, non toxic; no jvd; normal heart and lung sounds; abd soft nt nd; no pedal edema; labs including troponin, ecg, and cxr unremarkable, along with reassuring exam and atypucal story; ok for d/c with outpt f/u

## 2021-06-01 ENCOUNTER — OUTPATIENT (OUTPATIENT)
Dept: OUTPATIENT SERVICES | Facility: HOSPITAL | Age: 31
LOS: 1 days | End: 2021-06-01
Payer: MEDICAID

## 2021-06-01 DIAGNOSIS — Z98.890 OTHER SPECIFIED POSTPROCEDURAL STATES: Chronic | ICD-10-CM

## 2021-06-02 NOTE — ED ADULT NURSE NOTE - CAS DISCH ACCOMP BY
3 RN skin check complete with RN's Zackery .  Devices in place: Nasal Cannula.  Skin assessed under devices: yes.  Confirmed pressure ulcers found on: sacrum  New potential pressure ulcers noted on N/A.  Wound consult placed Yes.  The following interventions in place Mepilex, Q2 turns in place, patient on low air loss mattress. TAPS in use.     Patient has dressing in place for LLE. Dressing is clean, dry and intact.  Patient has previous right BKA that is ALHAJI.  Bruising and scattered abrasions noted on RUE.  Bruising noted on left side of abdomen.  Patient has sacrum pressure sore. Mepilex in place and dressing to be changed Q72 hours.   Self

## 2021-06-04 ENCOUNTER — INPATIENT (INPATIENT)
Facility: HOSPITAL | Age: 31
LOS: 11 days | Discharge: NURSING, REHAB OR LONG-TERM CARE | End: 2021-06-16
Attending: STUDENT IN AN ORGANIZED HEALTH CARE EDUCATION/TRAINING PROGRAM | Admitting: STUDENT IN AN ORGANIZED HEALTH CARE EDUCATION/TRAINING PROGRAM
Payer: MEDICAID

## 2021-06-04 VITALS
OXYGEN SATURATION: 100 % | DIASTOLIC BLOOD PRESSURE: 80 MMHG | TEMPERATURE: 98 F | HEART RATE: 92 BPM | RESPIRATION RATE: 16 BRPM | HEIGHT: 71 IN | SYSTOLIC BLOOD PRESSURE: 142 MMHG

## 2021-06-04 DIAGNOSIS — Z98.890 OTHER SPECIFIED POSTPROCEDURAL STATES: Chronic | ICD-10-CM

## 2021-06-04 PROCEDURE — 99285 EMERGENCY DEPT VISIT HI MDM: CPT

## 2021-06-04 NOTE — ED ADULT TRIAGE NOTE - CHIEF COMPLAINT QUOTE
Pt w/ hx of CVA, Mcclain-[place] with lumbar spine deterioration c/o sudden onset atraumatic bilateral LE weakness and lower back pain x 1 hr,  Pt endorses fever of 102.3 yesterday.

## 2021-06-05 DIAGNOSIS — Z02.9 ENCOUNTER FOR ADMINISTRATIVE EXAMINATIONS, UNSPECIFIED: ICD-10-CM

## 2021-06-05 DIAGNOSIS — Z29.9 ENCOUNTER FOR PROPHYLACTIC MEASURES, UNSPECIFIED: ICD-10-CM

## 2021-06-05 DIAGNOSIS — B20 HUMAN IMMUNODEFICIENCY VIRUS [HIV] DISEASE: ICD-10-CM

## 2021-06-05 DIAGNOSIS — R29.898 OTHER SYMPTOMS AND SIGNS INVOLVING THE MUSCULOSKELETAL SYSTEM: ICD-10-CM

## 2021-06-05 DIAGNOSIS — Z59.0 HOMELESSNESS: ICD-10-CM

## 2021-06-05 DIAGNOSIS — G61.0 GUILLAIN-BARRE SYNDROME: ICD-10-CM

## 2021-06-05 DIAGNOSIS — I48.91 UNSPECIFIED ATRIAL FIBRILLATION: ICD-10-CM

## 2021-06-05 LAB
ALBUMIN SERPL ELPH-MCNC: 4.2 G/DL — SIGNIFICANT CHANGE UP (ref 3.3–5)
ALP SERPL-CCNC: 78 U/L — SIGNIFICANT CHANGE UP (ref 40–120)
ALT FLD-CCNC: 11 U/L — SIGNIFICANT CHANGE UP (ref 4–41)
ANION GAP SERPL CALC-SCNC: 16 MMOL/L — HIGH (ref 7–14)
APPEARANCE UR: CLEAR — SIGNIFICANT CHANGE UP
APTT BLD: 32 SEC — SIGNIFICANT CHANGE UP (ref 27–36.3)
AST SERPL-CCNC: 22 U/L — SIGNIFICANT CHANGE UP (ref 4–40)
BACTERIA # UR AUTO: ABNORMAL
BASOPHILS # BLD AUTO: 0.02 K/UL — SIGNIFICANT CHANGE UP (ref 0–0.2)
BASOPHILS NFR BLD AUTO: 0.3 % — SIGNIFICANT CHANGE UP (ref 0–2)
BILIRUB SERPL-MCNC: 0.6 MG/DL — SIGNIFICANT CHANGE UP (ref 0.2–1.2)
BILIRUB UR-MCNC: NEGATIVE — SIGNIFICANT CHANGE UP
BLD GP AB SCN SERPL QL: NEGATIVE — SIGNIFICANT CHANGE UP
BUN SERPL-MCNC: 12 MG/DL — SIGNIFICANT CHANGE UP (ref 7–23)
CALCIUM SERPL-MCNC: 9.3 MG/DL — SIGNIFICANT CHANGE UP (ref 8.4–10.5)
CHLORIDE SERPL-SCNC: 103 MMOL/L — SIGNIFICANT CHANGE UP (ref 98–107)
CK SERPL-CCNC: 227 U/L — HIGH (ref 30–200)
CO2 SERPL-SCNC: 21 MMOL/L — LOW (ref 22–31)
COLOR SPEC: YELLOW — SIGNIFICANT CHANGE UP
COMMENT - URINE: SIGNIFICANT CHANGE UP
COVID-19 SPIKE DOMAIN AB INTERP: POSITIVE
COVID-19 SPIKE DOMAIN ANTIBODY RESULT: >250 U/ML — HIGH
CREAT SERPL-MCNC: 0.96 MG/DL — SIGNIFICANT CHANGE UP (ref 0.5–1.3)
CRP SERPL-MCNC: <4 MG/L — SIGNIFICANT CHANGE UP
DIFF PNL FLD: NEGATIVE — SIGNIFICANT CHANGE UP
EOSINOPHIL # BLD AUTO: 0.08 K/UL — SIGNIFICANT CHANGE UP (ref 0–0.5)
EOSINOPHIL NFR BLD AUTO: 1.4 % — SIGNIFICANT CHANGE UP (ref 0–6)
EPI CELLS # UR: SIGNIFICANT CHANGE UP
ERYTHROCYTE [SEDIMENTATION RATE] IN BLOOD: 25 MM/HR — HIGH (ref 1–15)
FOLATE SERPL-MCNC: 19.6 NG/ML — HIGH (ref 3.1–17.5)
GLUCOSE SERPL-MCNC: 69 MG/DL — LOW (ref 70–99)
GLUCOSE UR QL: NEGATIVE — SIGNIFICANT CHANGE UP
HCT VFR BLD CALC: 36.7 % — LOW (ref 39–50)
HGB BLD-MCNC: 11.6 G/DL — LOW (ref 13–17)
IANC: 2.89 K/UL — SIGNIFICANT CHANGE UP (ref 1.5–8.5)
IMM GRANULOCYTES NFR BLD AUTO: 0.2 % — SIGNIFICANT CHANGE UP (ref 0–1.5)
INR BLD: 1.24 RATIO — HIGH (ref 0.88–1.16)
KETONES UR-MCNC: ABNORMAL
LEUKOCYTE ESTERASE UR-ACNC: NEGATIVE — SIGNIFICANT CHANGE UP
LYMPHOCYTES # BLD AUTO: 2.27 K/UL — SIGNIFICANT CHANGE UP (ref 1–3.3)
LYMPHOCYTES # BLD AUTO: 39.5 % — SIGNIFICANT CHANGE UP (ref 13–44)
MCHC RBC-ENTMCNC: 28.6 PG — SIGNIFICANT CHANGE UP (ref 27–34)
MCHC RBC-ENTMCNC: 31.6 GM/DL — LOW (ref 32–36)
MCV RBC AUTO: 90.4 FL — SIGNIFICANT CHANGE UP (ref 80–100)
MONOCYTES # BLD AUTO: 0.47 K/UL — SIGNIFICANT CHANGE UP (ref 0–0.9)
MONOCYTES NFR BLD AUTO: 8.2 % — SIGNIFICANT CHANGE UP (ref 2–14)
NEUTROPHILS # BLD AUTO: 2.89 K/UL — SIGNIFICANT CHANGE UP (ref 1.8–7.4)
NEUTROPHILS NFR BLD AUTO: 50.4 % — SIGNIFICANT CHANGE UP (ref 43–77)
NITRITE UR-MCNC: NEGATIVE — SIGNIFICANT CHANGE UP
NRBC # BLD: 0 /100 WBCS — SIGNIFICANT CHANGE UP
NRBC # FLD: 0 K/UL — SIGNIFICANT CHANGE UP
PH UR: 6 — SIGNIFICANT CHANGE UP (ref 5–8)
PLATELET # BLD AUTO: 285 K/UL — SIGNIFICANT CHANGE UP (ref 150–400)
POTASSIUM SERPL-MCNC: 3.6 MMOL/L — SIGNIFICANT CHANGE UP (ref 3.5–5.3)
POTASSIUM SERPL-SCNC: 3.6 MMOL/L — SIGNIFICANT CHANGE UP (ref 3.5–5.3)
PROT SERPL-MCNC: 7.6 G/DL — SIGNIFICANT CHANGE UP (ref 6–8.3)
PROT UR-MCNC: ABNORMAL
PROTHROM AB SERPL-ACNC: 14.1 SEC — HIGH (ref 10.6–13.6)
RBC # BLD: 4.06 M/UL — LOW (ref 4.2–5.8)
RBC # FLD: 13.5 % — SIGNIFICANT CHANGE UP (ref 10.3–14.5)
RBC CASTS # UR COMP ASSIST: <5 /HPF — SIGNIFICANT CHANGE UP (ref 0–4)
RH IG SCN BLD-IMP: POSITIVE — SIGNIFICANT CHANGE UP
SARS-COV-2 IGG+IGM SERPL QL IA: >250 U/ML — HIGH
SARS-COV-2 IGG+IGM SERPL QL IA: POSITIVE
SARS-COV-2 RNA SPEC QL NAA+PROBE: SIGNIFICANT CHANGE UP
SODIUM SERPL-SCNC: 140 MMOL/L — SIGNIFICANT CHANGE UP (ref 135–145)
SP GR SPEC: 1.03 — HIGH (ref 1.01–1.02)
T4 AB SER-ACNC: 5.83 UG/DL — SIGNIFICANT CHANGE UP (ref 5.1–13)
TSH SERPL-MCNC: 0.29 UIU/ML — SIGNIFICANT CHANGE UP (ref 0.27–4.2)
UROBILINOGEN FLD QL: SIGNIFICANT CHANGE UP
VIT B12 SERPL-MCNC: 343 PG/ML — SIGNIFICANT CHANGE UP (ref 200–900)
WBC # BLD: 5.74 K/UL — SIGNIFICANT CHANGE UP (ref 3.8–10.5)
WBC # FLD AUTO: 5.74 K/UL — SIGNIFICANT CHANGE UP (ref 3.8–10.5)
WBC UR QL: <5 /HPF — SIGNIFICANT CHANGE UP (ref 0–5)

## 2021-06-05 PROCEDURE — 99223 1ST HOSP IP/OBS HIGH 75: CPT | Mod: GC

## 2021-06-05 PROCEDURE — 93010 ELECTROCARDIOGRAM REPORT: CPT

## 2021-06-05 PROCEDURE — 72149 MRI LUMBAR SPINE W/DYE: CPT | Mod: 26

## 2021-06-05 RX ORDER — ENOXAPARIN SODIUM 100 MG/ML
40 INJECTION SUBCUTANEOUS DAILY
Refills: 0 | Status: DISCONTINUED | OUTPATIENT
Start: 2021-06-05 | End: 2021-06-10

## 2021-06-05 RX ORDER — BACLOFEN 100 %
10 POWDER (GRAM) MISCELLANEOUS
Refills: 0 | Status: DISCONTINUED | OUTPATIENT
Start: 2021-06-05 | End: 2021-06-16

## 2021-06-05 RX ORDER — GABAPENTIN 400 MG/1
1 CAPSULE ORAL
Qty: 0 | Refills: 0 | DISCHARGE

## 2021-06-05 RX ORDER — ACETAMINOPHEN 500 MG
650 TABLET ORAL ONCE
Refills: 0 | Status: COMPLETED | OUTPATIENT
Start: 2021-06-05 | End: 2021-06-05

## 2021-06-05 RX ORDER — GABAPENTIN 400 MG/1
300 CAPSULE ORAL THREE TIMES A DAY
Refills: 0 | Status: DISCONTINUED | OUTPATIENT
Start: 2021-06-05 | End: 2021-06-16

## 2021-06-05 RX ORDER — BICTEGRAVIR SODIUM, EMTRICITABINE, AND TENOFOVIR ALAFENAMIDE FUMARATE 30; 120; 15 MG/1; MG/1; MG/1
1 TABLET ORAL DAILY
Refills: 0 | Status: DISCONTINUED | OUTPATIENT
Start: 2021-06-05 | End: 2021-06-16

## 2021-06-05 RX ORDER — QUETIAPINE FUMARATE 200 MG/1
200 TABLET, FILM COATED ORAL AT BEDTIME
Refills: 0 | Status: DISCONTINUED | OUTPATIENT
Start: 2021-06-05 | End: 2021-06-16

## 2021-06-05 RX ADMIN — GABAPENTIN 300 MILLIGRAM(S): 400 CAPSULE ORAL at 14:37

## 2021-06-05 RX ADMIN — Medication 650 MILLIGRAM(S): at 03:02

## 2021-06-05 RX ADMIN — ENOXAPARIN SODIUM 40 MILLIGRAM(S): 100 INJECTION SUBCUTANEOUS at 14:37

## 2021-06-05 RX ADMIN — Medication 2 MILLIGRAM(S): at 04:28

## 2021-06-05 RX ADMIN — QUETIAPINE FUMARATE 200 MILLIGRAM(S): 200 TABLET, FILM COATED ORAL at 21:32

## 2021-06-05 RX ADMIN — Medication 10 MILLIGRAM(S): at 18:03

## 2021-06-05 RX ADMIN — BICTEGRAVIR SODIUM, EMTRICITABINE, AND TENOFOVIR ALAFENAMIDE FUMARATE 1 TABLET(S): 30; 120; 15 TABLET ORAL at 14:37

## 2021-06-05 RX ADMIN — GABAPENTIN 300 MILLIGRAM(S): 400 CAPSULE ORAL at 21:32

## 2021-06-05 SDOH — ECONOMIC STABILITY - HOUSING INSECURITY: HOMELESSNESS: Z59.0

## 2021-06-05 NOTE — H&P ADULT - NSHPREVIEWOFSYSTEMS_GEN_ALL_CORE
REVIEW OF SYSTEMS:    CONSTITUTIONAL:+  weakness, +fevers no chills  EYES/ENT: No visual changes;  No vertigo or throat pain   NECK: No pain or stiffness  RESPIRATORY: No cough, wheezing, hemoptysis; No shortness of breath  CARDIOVASCULAR: No chest pain or palpitations  GASTROINTESTINAL: No abdominal or epigastric pain. No nausea, vomiting, or hematemesis; No diarrhea or constipation. No melena or hematochezia.   BACK: No CVA tenderness  GENITOURINARY: Incomplete bladder emptying, no dysuria, hematuria  NEUROLOGICAL: No numbness or weakness  SKIN: No itching, rashes REVIEW OF SYSTEMS:    CONSTITUTIONAL:+  weakness, +fevers no chills  EYES/ENT: No visual changes;  No vertigo or throat pain   NECK: No pain or stiffness  RESPIRATORY: No cough, wheezing, hemoptysis; No shortness of breath  CARDIOVASCULAR: No chest pain or palpitations  GASTROINTESTINAL: No abdominal or epigastric pain. No nausea, vomiting, or hematemesis; No diarrhea or constipation. No melena or hematochezia.   BACK: No CVA tenderness  GENITOURINARY: +Incomplete bladder emptying, no dysuria, hematuria  NEUROLOGICAL: No numbness or weakness  SKIN: No itching, rashes

## 2021-06-05 NOTE — H&P ADULT - PROBLEM SELECTOR PLAN 1
-neurology onboard, appreciate recommendations  -MRI L/S spine w/wo contrast limited by motion artifact, benign  -f/u MR brain, cervical and thoracic spine  -f/u screen for HSV,1/2, VZV, CMV, WNV and syphilis, lyme -neurology onboard, appreciate recommendations  -MRI L/S spine w/wo contrast limited by motion artifact, benign  -f/u MR brain, cervical and thoracic spine  -f/u screen for HSV,1/2, VZV, CMV, WNV and syphilis, lyme. B12, folate -neurology onboard, appreciate recommendations  -MRI L/S spine w/wo contrast limited by motion artifact, benign (negative for OM, abscess)  -f/u MR brain, cervical and thoracic spine, repeat lumbar spine 2/2 initial imaging limitations?  -f/u screen for HSV,1/2, VZV, CMV, WNV and syphilis, lyme. B12, folate.   -PT consult

## 2021-06-05 NOTE — H&P ADULT - PROBLEM SELECTOR PLAN 3
-patient noncompliant on medications d/t lack of access (financial limiations, patient homeless)  -f/u HIV viral load, CD4 count  -consider resuming HAART, concern for immune reconstitution inflammatory syndrome, will discuss (will likely follow up CD4 count, IRIS uncommon in individuals with CD4 >100).   -f/u blood cultures (patient not septic on presentation, reported fevers) -patient noncompliant on medications d/t lack of access (financial limiations, patient homeless)  -f/u HIV viral load, CD4 count  -consider resuming HAART, concern for immune reconstitution inflammatory syndrome, will discuss (will likely follow up CD4 count, IRIS uncommon in individuals with CD4 >100). Consider ID consult.   -f/u blood cultures (patient not septic on presentation, reported fevers) -patient noncompliant on medications d/t lack of access (when asked, patient states he "does not know where to get them").   -f/u HIV viral load, CD4 count  -consider resuming HAART, concern for immune reconstitution inflammatory syndrome, will discuss (will follow up CD4 count, IRIS uncommon in individuals with CD4 >100). Consider ID consult at least to establish continuity, ensure regimen is appropriate for patient.   -f/u blood cultures (patient not septic on presentation, reported fevers) -patient noncompliant on medications d/t lack of access (when asked, patient states he "does not know where to get them").   -f/u HIV viral load, CD4 count  -consider resuming HAART, concern for immune reconstitution inflammatory syndrome, will discuss (will follow up CD4 count, IRIS uncommon in individuals with CD4 >100).   - ID consulted, awaiting recommendations at least to establish continuity, ensure regimen is appropriate for patient.   -f/u blood cultures (patient not septic on presentation, reported fevers)

## 2021-06-05 NOTE — ED PROVIDER NOTE - OBJECTIVE STATEMENT
31 year old male with PMhx HIV, (last viral load 2 months ago undetectable, CD4 count 380), current smoker, stroke, A fib (No AC) and GB syndrome presents to the ED with worsening blt LE weakness, urinary hesitancy and lower back pain.  Evaluated at [facility] last month with negative spine MRI. Reports he was walking tonight, and about an hour ago had blt leg weakness and was unable to ambulate. Reports fever of 102.3 yesterday. Denies cough, diarrhea or dysuria. Reports lower back pain. No hx of trauma. Hx of polysubstance abuse. Reports using cocaine in the past. Currently smokes marihuana. Denies IV drug use. Reports urinary hesitancy since yesterday. has not taken HIV meds for 2 months. 31 year old male with PMhx HIV, (last viral load 2 months ago undetectable, CD4 count 380), current smoker, stroke, A fib (No AC) and GB syndrome presents to the ED with worsening blt LE weakness, urinary hesitancy and lower back pain.  Evaluated at [facility] last month with negative spine MRI. Reports he was walking tonight, and about an hour ago had blt leg weakness and was unable to ambulate. Reports fever of 102.3 yesterday. Denies cough, diarrhea or dysuria. Reports lower back pain. No hx of trauma. Hx of polysubstance abuse. Reports using cocaine in the past. Currently smokes marihuana. Denies IV drug use. Reports urinary hesitancy since yesterday. has not taken HIV meds for 2 months.    Attendinyo male presents with lower extremity weakness that occurred yesterday.  had fever of 102 yesterday.  pt has not been taking his HIV medication for 2 months.  has low back pain.

## 2021-06-05 NOTE — ED ADULT NURSE NOTE - OBJECTIVE STATEMENT
Patient received in room 27, A&OX4, ambulatory at baseline, hx of CVA, HIV, Mcclain-[place] with lumbar spine deterioration. Patient reports to ED complaining of sudden bilateral Lower extremity weakness/pain, urinary hesitancy, and lower back pain starting at around 6pm last night. Patient reports fever at home of 102.3. Pt reports that he is noncompliant with his medications at home. Pt denies trauma to area, falls, chest pain, shortness of breath, nausea, vomiting. Pt seen at [facility] last month with negative MRI workup for spine. 20g placed in left ac, labs collected and sent. Medication given as per MD order. Patient awaiting MRI. Patient in no acute distress, respirations even and unlabored. Will continue to monitor.

## 2021-06-05 NOTE — H&P ADULT - PROBLEM SELECTOR PLAN 2
-history of GBS diagnosed last year, responded well to treatment  -consider LP if imaging unrevealing?  -consider recurrence of GBS, will discuss

## 2021-06-05 NOTE — H&P ADULT - HISTORY OF PRESENT ILLNESS
31 year old man,  with congential HIV, GBS, AFib not on AC and reported stroke who p/w acute worsening B/L LE weakness x 2 days a/w fever (102 fever yesterday) as well as new change in urinary/bowel pattern.  Patient reports last year had initially had developed severe bilateral LE weakness and was eventually diagnosed with GBS at [place] where he underwent treatment and improved.  He was doing well at Rehab and was well supplied with HIV medications as well at which time he was compliant with meds.  In last two months, he has not had regular access to HIV meds and thus has not been taking his Bictarvy. He recently presented to [facility] for similar worsening paraparesis at which time MRI C/T/L spine and LP were done which were unrevealing (05/2021).  PT was discharged and now presenting 1 week later with 2 days of severe worsening + new fever, urinary urgency w/ incomplete bladder emptying and bowel urgency.  Denies any saddle anesthesia or numbness otherwise.  Is unable to walk.     In the ED, rectal Tmax 98.4, HR 75-92, -142/57-80, RR 16-17, 100% on room air. Patient given 1x tylenol and 1xativan. Neurology consulted, MR lumbar spine benign. Awaiting further workup.  31 year old man,  with congential HIV, GBS diagnosed one year ago responded to treatment and PT (now with residual LE weakness, requires cane intermittentl), afib not on AC and reported stroke who p/w acute worsening B/L LE weakness x 2 days causing inability to walk a/w fever (102 fever yesterday) as well as new urinary urge and difficulty emptying bladder.  Patient reports last year had initially had developed severe bilateral LE weakness and was eventually diagnosed with GBS at [place] where he underwent treatment and improved.  He was doing well at Rehab and was well supplied with HIV medications as well at which time he was compliant with meds.  In last two months, he has not had regular access to HIV meds and thus has not been taking his Bictarvy. He recently presented to [facility] for similar worsening paraparesis at which time MRI C/T/L spine and LP were done which were unrevealing (05/2021).      On ROS, patient endorses bifrontal headache, denies visual changes, blurry vision, fever, chest pain,  saddle anesthesia or numbness otherwise, fecal incontinence.     In the ED, rectal Tmax 98.4, HR 75-92, -142/57-80, RR 16-17, 100% on room air. Patient given 1x tylenol and 1xativan. Neurology consulted, MR lumbar spine benign.  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  Patient reports last year had initially had developed severe bilateral LE weakness and was eventually diagnosed with GBS at [place] where he underwent treatment and improved.  He was doing well at Rehab and was well supplied with HIV medications as well at which time he was compliant with meds.  In last two months, he has not had regular access to HIV meds and thus has not been taking his Bictarvy. He recently presented to [facility] for similar worsening paraparesis at which time MRI C/T/L spine and LP were done which were unrevealing (05/2021).      On ROS, patient endorses bifrontal headache, denies visual changes, blurry vision, fever, chest pain,  saddle anesthesia or numbness otherwise, fecal incontinence.     In the ED, rectal Tmax 98.4, HR 75-92, -142/57-80, RR 16-17, 100% on room air. Patient given 1x tylenol and 1xativan. Neurology consulted, MR lumbar spine benign.

## 2021-06-05 NOTE — H&P ADULT - NSHPPHYSICALEXAM_GEN_ALL_CORE
Vital Signs Last 24 Hrs  T(C): 36.9 (05 Jun 2021 06:24), Max: 36.9 (04 Jun 2021 23:29)  T(F): 98.5 (05 Jun 2021 06:24), Max: 98.5 (04 Jun 2021 23:29)  HR: 75 (05 Jun 2021 06:24) (75 - 92)  BP: 106/57 (05 Jun 2021 06:24) (106/57 - 142/80)  BP(mean): --  RR: 18 (05 Jun 2021 06:24) (16 - 18)  SpO2: 100% (05 Jun 2021 06:24) (100% - 100%)    MS: AAOx4, anxious and visibly upset  CNs: PERRL. EOMI, face symmetric, no dysarthria, tongue midline.   Cortical: visual fields full, no extinction on double simultaneous stim.   Motor: 5/5 strength throughout UE B/L 2/5 strength B/L hip flexion, 3/5 B/L hip abduction and adduction, 2/5 knee extension., 3/5 dorsiflexion and plantarflexion  Reflexes: B/L 3+ reflexes w/ crossed adductors +, left upgoing toe, R. downgoing.   Sensory: Intact to LT and temp B/L throughout   MSK: Point tenderness to palpation in low mid lumbar spine. Vital Signs Last 24 Hrs  T(C): 36.9 (05 Jun 2021 06:24), Max: 36.9 (04 Jun 2021 23:29)  T(F): 98.5 (05 Jun 2021 06:24), Max: 98.5 (04 Jun 2021 23:29)  HR: 75 (05 Jun 2021 06:24) (75 - 92)  BP: 106/57 (05 Jun 2021 06:24) (106/57 - 142/80)  BP(mean): --  RR: 18 (05 Jun 2021 06:24) (16 - 18)  SpO2: 100% (05 Jun 2021 06:24) (100% - 100%)    MS: AAOx4, fatigued  CNs: PERRL. EOMI, face symmetric, no dysarthria, tongue midline.   Cortical: visual fields full, no extinction on double simultaneous stim.   Motor: 5/5 strength throughout UE B/L 2/5 strength B/L hip flexion, 3/5 B/L hip abduction and adduction, 1/5 knee extension., 1/5 dorsiflexion and plantarflexion  Reflexes: B/L 3+ reflexes w/ crossed adductors +, left upgoing toe, R. downgoing.   Sensory: Intact to LT and temp B/L throughout   MSK: Point tenderness to palpation in low mid lumbar spine.

## 2021-06-05 NOTE — H&P ADULT - PROBLEM SELECTOR PLAN 5
DVT prophylaxis: SCDs vs lovenox subQ Transitions of Care Status:  1.  Name of PCP:  2.  PCP Contacted on Admission: [ ] Y    [ ] N    3.  PCP contacted at Discharge: [ ] Y    [ ] N    [ ] N/A  4.  Post-Discharge Appointment Date and Location:  5.  Summary of Handoff given to PCP: Social work consult, history of homelessness, patient not homeless right now. Lack of access to medications.     Transitions of Care Status:  1.  Name of PCP:  2.  PCP Contacted on Admission: [ ] Y    [ ] N    3.  PCP contacted at Discharge: [ ] Y    [ ] N    [ ] N/A  4.  Post-Discharge Appointment Date and Location:  5.  Summary of Handoff given to PCP: DVT prophylaxis: lovenox 40mg subQ

## 2021-06-05 NOTE — ED ADULT NURSE NOTE - NSIMPLEMENTINTERV_GEN_ALL_ED
Implemented All Fall Risk Interventions:  [place] to call system. Call bell, personal items and telephone within reach. Instruct patient to call for assistance. Room bathroom lighting operational. Non-slip footwear when patient is off stretcher. Physically safe environment: no spills, clutter or unnecessary equipment. Stretcher in lowest position, wheels locked, appropriate side rails in place. Provide visual cue, wrist band, yellow gown, etc. Monitor gait and stability. Monitor for mental status changes and reorient to person, place, and time. Review medications for side effects contributing to fall risk. Reinforce activity limits and safety measures with patient and family.

## 2021-06-05 NOTE — ED ADULT NURSE NOTE - CHIEF COMPLAINT QUOTE
Pt w/ hx of CVA, Mcclain-[place] with lumbar spine deterioration c/o sudden onset atraumatic bilateral LE weakness and lower back pain x 1 hr,  Pt endorses fever of 102.3 yesterday.

## 2021-06-05 NOTE — H&P ADULT - NSHPLABSRESULTS_GEN_ALL_CORE
11.6   5.74  )-----------( 285      ( 05 Jun 2021 02:53 )             36.7   PT/INR - ( 05 Jun 2021 02:53 )   PT: 14.1 sec;   INR: 1.24 ratio  PTT - ( 05 Jun 2021 02:53 )  PTT:32.0 sec    HIV-1 RNA Quantitative, Viral Load (05.02.21 @ 06:03) 37,671 Protein  From prior admissions:   05/02/21 CSF  Protein 45 mg/dL   Glucose 48 mg/dL  Culture Results: No fungus isolated after 4 weeks. (05.02.21 @ 15:52)   Culture - CSF with Gram Stain (05.02.21 @ 15:52)   Gram Stain: No polymorphonuclear leukocytes.  No organisms seen by cytocentrifuge   Culture Results: No growth CSF PCR Panel (05.02.21 @ 15:52) VDRL Titer, CSF: Nonreact    05/09/21 CT head: No acute intracranial hemorrhage or mass effect.  05/09/21 CT cervical spine: No CT evidence of cervical spine fracture.    05/21/21 MR Lumbar Spine w/wo IV Cont:   Lumbar alignment is maintained. Vertebral body are normal in height and signal. Mild intervertebral disc height loss at L5-S1. No abnormal enhancement.  Evaluation of individual levels demonstrates:  L1-L2: No significant spinal canal or neuroforaminal narrowing.  L2-L3: No significant spinal canal or neuroforaminal narrowing.  L3-L4: No significant spinal canal or neuroforaminal narrowing.  L4-L5: No significant spinal canal or neuroforaminal narrowing.  L5-S1: Disc bulge. No significant spinal canal or neuroforaminal narrowing.  The conus is normal in position and morphology at the L1 level.    IMPRESSION: No significant spinal canal or neuroforaminal narrowing.        05/02/2021 MRI C/T/L Spine w/wo contrast:  Motion degraded exam. Mild spondylitic changes. No spinal cord or intraspinal abnormality identified. Prominent nasopharyngeal soft tissue likely representing adenoidal hypertrophy. Correlation with direct visualization is advised. 11.6   5.74  )-----------( 285      ( 05 Jun 2021 02:53 )             36.7   PT/INR - ( 05 Jun 2021 02:53 )   PT: 14.1 sec;   INR: 1.24 ratio  PTT - ( 05 Jun 2021 02:53 )  PTT:32.0 sec    HIV-1 RNA Quantitative, Viral Load (05.02.21 @ 06:03) 37,671  Protein  From prior admissions:   05/02/21 CSF  Protein 45 mg/dL   Glucose 48 mg/dL  Culture Results: No fungus isolated after 4 weeks. (05.02.21 @ 15:52)   Culture - CSF with Gram Stain (05.02.21 @ 15:52)   Gram Stain: No polymorphonuclear leukocytes.  No organisms seen by cytocentrifuge   Culture Results: No growth CSF PCR Panel (05.02.21 @ 15:52) VDRL Titer, CSF: Nonreact    05/09/21 CT head: No acute intracranial hemorrhage or mass effect.  05/09/21 CT cervical spine: No CT evidence of cervical spine fracture.    05/21/21 MR Lumbar Spine w/wo IV Cont:   Lumbar alignment is maintained. Vertebral body are normal in height and signal. Mild intervertebral disc height loss at L5-S1. No abnormal enhancement.  Evaluation of individual levels demonstrates:  L1-L2: No significant spinal canal or neuroforaminal narrowing.  L2-L3: No significant spinal canal or neuroforaminal narrowing.  L3-L4: No significant spinal canal or neuroforaminal narrowing.  L4-L5: No significant spinal canal or neuroforaminal narrowing.  L5-S1: Disc bulge. No significant spinal canal or neuroforaminal narrowing.  The conus is normal in position and morphology at the L1 level.    IMPRESSION: No significant spinal canal or neuroforaminal narrowing.        05/02/2021 MRI C/T/L Spine w/wo contrast:  Motion degraded exam. Mild spondylitic changes. No spinal cord or intraspinal abnormality identified. Prominent nasopharyngeal soft tissue likely representing adenoidal hypertrophy. Correlation with direct visualization is advised.

## 2021-06-05 NOTE — H&P ADULT - NSHPSOCIALHISTORY_GEN_ALL_CORE
On prior presentation, patient endorses tobacco, marijuana, heroin, cocaine use.  Denies IVDA. Denies alcohol use.  This admission, denies tobacco, endorses social ETOH use and social marijuana use but denies other drugs of abuse including IV. On prior presentation, patient endorses tobacco, marijuana, heroin, cocaine use.  Denies IVDA. Denies alcohol use.  This admission, denies tobacco, endorses social ETOH use and social marijuana use but denies other drugs of abuse including IV.    Patient now lives in [place], lives alone. Works cleaning highways.

## 2021-06-05 NOTE — H&P ADULT - PROBLEM SELECTOR PLAN 4
Social work consult DVT prophylaxis: lovenox 40mg subQ patient reports diagnosis of afib approximately 1 year ago, reported stroke but no residual deficits, no documentation from prior hospital visits of afib diagnosis, stroke worked up but was negative  -continue to monitor for now, hold off AC

## 2021-06-05 NOTE — ED PROVIDER NOTE - PHYSICAL EXAMINATION
Gen: AAOx3, non-toxic  Head: NCAT  HEENT: EOMI, oral mucosa moist, normal conjunctiva  Lung: CTAB, no respiratory distress, no wheezes/rhonchi/rales B/L, speaking in full sentences  CV: RRR, no murmurs, rubs or gallops  Abd: soft, NTND, no guarding, no CVA tenderness  MSK: no visible deformities. No midline back ttp.   Neuro: 1/5 strength in blt legs. Normal sensation. No saddle aesthesia. Normal rectal tone. Normal UE exam.   Skin: Warm, well perfused, no rash  Psych: normal affect.

## 2021-06-05 NOTE — H&P ADULT - ATTENDING COMMENTS
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 Patient with multiple visits to local hospitals over the past few months with similar complaints including [facility], [facility], [facility], [facility], [place], [place], [facility]. Recently he was at [place] (05/02-05/03), initially evaluated in ED as code stroke, CTH with no acute infarct, LP unremarkable, MR C/T/L spine negative, evaluated by neuro low suspicion for GBS, recommended outpatient PT. He then went to [place] ED 5/9 (not admitted) - CT H/C-spine negative, and then to [facility] (05/20-05/26) - CT H/L-spine negative. He now returns with similar complaints acute on chronic LE weakness and fevers (however no documented fevers in ED thus far). VSS. Labs reviewed - mild anemia. MR Lumbar spine - no evidence of large epidural abscess or central canal stenosis. Appreciate neurology eval - will obtain MRI Brain, C/T spine wwo contrast, send screening studies quant gold, HSV, VZV, CMV, WNV, Syphillis, Lyme. Send blood cultures, ESR/CRP, urine/serum tox. Given that patient is afebrile, MR L-spine without epidural abscess, hold off empiric abx, should patient become febrile low threshold to start empiric abx (vanc/cefepime). Patient with poor adherence to Biktarvy, usually receives during hospitalizations (last 5/26), HIV viral load/CD4 count sent, continue Biktarvy for now. ID consulted - follow up recommendations. Reported history of afib/CVA in 2020 - however upon chart review work up for CVA was negative, and no documentation of afib, patient not on AC or rate control agents. Rest of care as above. 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 Patient with multiple visits to local hospitals over the past few months with similar complaints including [facility], [facility], [facility], [facility], [place], [place], [facility]. Recently he was at [place] (05/02-05/03), initially evaluated as code stroke, CTH with no acute infarct, LP unremarkable, MR C/T/L spine negative, evaluated by neuro low suspicion for GBS, recommended outpatient PT. He then went to [place] ED 5/9 (not admitted) - CT H/C-spine negative, and then to [facility] (05/20-05/26) - CT H/L-spine negative. He now returns with similar complaints of acute on chronic LE weakness and fevers (however no documented fevers in ED thus far). VSS. Labs reviewed - mild anemia. MR Lumbar spine - no evidence of large epidural abscess or central canal stenosis. Appreciate neurology eval - will obtain MRI Brain, C/T spine wwo contrast, send screening studies quant gold, HSV, VZV, CMV, WNV, Syphillis, Lyme. Send blood cultures, ESR/CRP, urine/serum tox. Given that patient is afebrile, MR L-spine without epidural abscess, hold off empiric abx, should patient become febrile low threshold to start empiric abx (vanc/cefepime). Patient with poor adherence to Biktarvy, usually receives during hospitalizations (last 5/26), HIV viral load/CD4 count sent, continue Biktarvy for now. ID consulted - follow up recommendations. Reported history of afib/CVA in 2020 - however upon chart review work up for CVA was negative, and no documentation of afib, patient not on AC or rate control agents. Rest of care as above.

## 2021-06-05 NOTE — H&P ADULT - PROBLEM SELECTOR PLAN 6
Transitions of Care Status:  1.  Name of PCP:  2.  PCP Contacted on Admission: [ ] Y    [ ] N    3.  PCP contacted at Discharge: [ ] Y    [ ] N    [ ] N/A  4.  Post-Discharge Appointment Date and Location:  5.  Summary of Handoff given to PCP: Social work consult, history of homelessness, patient not homeless right now. Lack of access to medications.   -f/u UTox, c/w seroquel    Transitions of Care Status:  1.  Name of PCP:  2.  PCP Contacted on Admission: [ ] Y    [ ] N    3.  PCP contacted at Discharge: [ ] Y    [ ] N    [ ] N/A  4.  Post-Discharge Appointment Date and Location:  5.  Summary of Handoff given to PCP:

## 2021-06-05 NOTE — ED PROVIDER NOTE - NS ED ROS FT
GENERAL: No fever or chills  EYES: no change in vision  HEENT: no trouble swallowing or speaking  CARDIAC: no chest pain or palpiations   PULMONARY: no cough or SOB  GI: no abdominal pain, nausea, vomiting, diarrhea, or constipation   : No changes in urination  SKIN: no rashes  NEURO: see hpi  MSK: see hpi

## 2021-06-05 NOTE — ED PROVIDER NOTE - CLINICAL SUMMARY MEDICAL DECISION MAKING FREE TEXT BOX
31 year old male with PMhx HIV, (last viral load 2 months ago undetectable, CD4 count 380), current smoker, stroke, A fib (No AC) and GB syndrome presents to the ED with worsening blt LE weakness, urinary hesitancy and lower back pain.  VS WNL. 1/5 strength in blt legs. Normal sensation. No saddle aesthesia. Normal rectal tone. Normal UE exam. No midline ttp. Ddx GBS flare?, cord compression, epidural abscess. Neurology consulted. awaiting recs.

## 2021-06-05 NOTE — ED PROVIDER NOTE - ATTENDING CONTRIBUTION TO CARE
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 Found to have L side loculated pleural effusion, admitted to ICU at [facility] for further       Events last 24 hours:         PAST MEDICAL & SURGICAL HISTORY:  GI Bleed: 2007  Stomach Ulcer: H pylori 2007, treated with antibiotics  Osteoarthritis  Hyperlipidemia  Herniated Disc  Spinal Stenosis  Diverticulitis  GERD (Gastroesophageal Reflux Disease)  Chronic Lymphocytic Leukemia: followed by oncologist in [place]  S/P Tonsillectomy: childhood  Status Post Arthroscopy: right shoulder  History of Arthroscopy of Knee: bilateral    Allergies    No Known Allergies    Intolerances      FAMILY HISTORY:  No pertinent family history in first degree relatives      Review of Systems:  CONSTITUTIONAL: No fever, chills, or fatigue  EYES: No eye pain, visual disturbances, or discharge  ENMT:  No difficulty hearing, tinnitus, vertigo; No sinus or throat pain  NECK: No pain or stiffness  RESPIRATORY: No cough, wheezing, chills or hemoptysis; No shortness of breath  CARDIOVASCULAR: No chest pain, palpitations, dizziness, or leg swelling  GASTROINTESTINAL: No abdominal or epigastric pain. No nausea, vomiting, or hematemesis; No diarrhea or constipation. No melena or hematochezia.  GENITOURINARY: No dysuria, frequency, hematuria, or incontinence  NEUROLOGICAL: No headaches, memory loss, loss of strength, numbness, or tremors  SKIN: No itching, burning, rashes, or lesions   MUSCULOSKELETAL: No joint pain or swelling; No muscle, back, or extremity pain  PSYCHIATRIC: No depression, anxiety, mood swings, or difficulty sleeping      Medications:    midodrine 15 milliGRAM(s) Oral every 8 hours    albuterol/ipratropium for Nebulization 3 milliLiter(s) Nebulizer every 6 hours PRN  dornase brenda Solution for Pleural Effusion 5 milliGRAM(s) IntraPleural. every 12 hours    acetaminophen   Tablet .. 650 milliGRAM(s) Oral every 6 hours PRN  dronabinol 2.5 milliGRAM(s) Oral two times a day  HYDROmorphone  Injectable 1 milliGRAM(s) IV Push once  melatonin 6 milliGRAM(s) Oral at bedtime      alteplase  Injectable for Pleural Effusion 10 milliGRAM(s) IntraPleural. every 12 hours  heparin  Injectable 5000 Unit(s) SubCutaneous every 8 hours    famotidine    Tablet 20 milliGRAM(s) Oral daily  polyethylene glycol 3350 17 Gram(s) Oral two times a day      atorvastatin 10 milliGRAM(s) Oral at bedtime      epoetin brenda Injectable 17758 Unit(s) IV Push <User Schedule>    chlorhexidine 2% Cloths 1 Application(s) Topical <User Schedule>    lactobacillus acidophilus 1 Tablet(s) Oral three times a day  sevelamer carbonate 800 milliGRAM(s) Oral three times a day with meals          ICU Vital Signs Last 24 Hrs  T(C): 37 (07 Mar 2020 00:00), Max: 37 (07 Mar 2020 00:00)  T(F): 98.6 (07 Mar 2020 00:00), Max: 98.6 (07 Mar 2020 00:00)  HR: 96 (06 Mar 2020 22:30) (58 - 107)  BP: 115/77 (06 Mar 2020 22:30) (75/52 - 165/71)  BP(mean): 90 (06 Mar 2020 22:30) (59 - 98)  ABP: --  ABP(mean): --  RR: 27 (06 Mar 2020 22:30) (13 - 34)  SpO2: 93% (06 Mar 2020 22:30) (93% - 97%)    Vital Signs Last 24 Hrs  T(C): 37 (07 Mar 2020 00:00), Max: 37 (07 Mar 2020 00:00)  T(F): 98.6 (07 Mar 2020 00:00), Max: 98.6 (07 Mar 2020 00:00)  HR: 96 (06 Mar 2020 22:30) (58 - 107)  BP: 115/77 (06 Mar 2020 22:30) (75/52 - 165/71)  BP(mean): 90 (06 Mar 2020 22:30) (59 - 98)  RR: 27 (06 Mar 2020 22:30) (13 - 34)  SpO2: 93% (06 Mar 2020 22:30) (93% - 97%)        I&O's Detail    05 Mar 2020 07:01  -  06 Mar 2020 07:00  --------------------------------------------------------  IN:    Albumin 25%: 100 mL    dexmedetomidine Infusion: 55.8 mL    Lactated Ringers IV Bolus: 500 mL    norepinephrine Infusion: 119.6 mL    Oral Fluid: 600 mL    Solution: 200 mL  Total IN: 1575.4 mL    OUT:    Chest Tube: 235 mL    Indwelling Catheter - Urethral: 670 mL  Total OUT: 905 mL    Total NET: 670.4 mL      06 Mar 2020 07:01  -  07 Mar 2020 02:09  --------------------------------------------------------  IN:    dexmedetomidine Infusion: 9.6 mL    norepinephrine Infusion: 34.2 mL    Oral Fluid: 570 mL    Solution: 100 mL  Total IN: 713.8 mL    OUT:    Chest Tube: 50 mL    Indwelling Catheter - Urethral: 1110 mL  Total OUT: 1160 mL    Total NET: -446.2 mL            LABS:                        8.5    7.85  )-----------( 302      ( 06 Mar 2020 06:06 )             25.5     03-06    132<L>  |  97  |  74<H>  ----------------------------<  132<H>  5.0   |  21<L>  |  6.20<H>    Ca    8.1<L>      06 Mar 2020 06:06  Phos  5.9     03-06  Mg     3.0     03-06    TPro  5.8<L>  /  Alb  2.4<L>  /  TBili  0.9  /  DBili  x   /  AST  34  /  ALT  43  /  AlkPhos  81  03-06          CAPILLARY BLOOD GLUCOSE        PT/INR - ( 06 Mar 2020 06:06 )   PT: 17.5 sec;   INR: 1.54 ratio         PTT - ( 06 Mar 2020 06:06 )  PTT:22.7 sec    CULTURES:  Culture Results:   No growth (03-01 @ 13:24)      Physical Examination:    General: No acute distress.  Alert, oriented, interactive, nonfocal    HEENT: Pupils equal, reactive to light.  Symmetric.    PULM: Clear to auscultation bilaterally, no significant sputum production    CVS: Regular rate and rhythm, no murmurs, rubs, or gallops    ABD: Soft, nondistended, nontender, normoactive bowel sounds, no masses    EXT: No edema, nontender    SKIN: Warm and well perfused, no rashes noted.    RADIOLOGY: ***    CRITICAL CARE TIME SPENT: *** Patient is a 89y old  Male who presents with a chief complaint of Left Empyema (06 Mar 2020 18:36)      BRIEF HOSPITAL COURSE:   Patient is a 89 year old male with a pmhx of CLL/MALToma not on active treatment, GERD, HLD, herniated discs/spinal stenosis s/p back surgery on medicinal Marijuana initially presented to [facility] ED w/ worsening back/chest pain. Pt found to have loculated left pleural effusions on CT Chest, concerning for empyema. Pt underwent IR guided left chest tube on 2/27, initially w/ minimal drainage, subsequently transferred to \[facility]\"" ED for further management. L sided loculated pleural effusion treated with TPA and dornase via chest tube.  Course complicated by acute renal failure 2.2 ischemic ATN vs vancomycin use requiring dialysis.     Events last 24 hours:   - Pt seen and examined not offering any medical complaints, however he does groan loudly from time to time  - s/p last dose of TPA/dornase   - midodrine 15 mg  - s/p 1 dialysis session     PAST MEDICAL & SURGICAL HISTORY:  GI Bleed: 2007  Stomach Ulcer: H pylori 2007, treated with antibiotics  Osteoarthritis  Hyperlipidemia  Herniated Disc  Spinal Stenosis  Diverticulitis  GERD (Gastroesophageal Reflux Disease)  Chronic Lymphocytic Leukemia: followed by oncologist in [place]  S/P Tonsillectomy: childhood  Status Post Arthroscopy: right shoulder  History of Arthroscopy of Knee: bilateral    Allergies    No Known Allergies    Intolerances      FAMILY HISTORY:  No pertinent family history in first degree relatives      Review of Systems:  unable to attain 2/2 mental status       Medications:  midodrine 15 milliGRAM(s) Oral every 8 hours  albuterol/ipratropium for Nebulization 3 milliLiter(s) Nebulizer every 6 hours PRN  dornase brenda Solution for Pleural Effusion 5 milliGRAM(s) IntraPleural. every 12 hours  acetaminophen   Tablet .. 650 milliGRAM(s) Oral every 6 hours PRN  dronabinol 2.5 milliGRAM(s) Oral two times a day  HYDROmorphone  Injectable 1 milliGRAM(s) IV Push once  melatonin 6 milliGRAM(s) Oral at bedtime  alteplase  Injectable for Pleural Effusion 10 milliGRAM(s) IntraPleural. every 12 hours  heparin  Injectable 5000 Unit(s) SubCutaneous every 8 hours  famotidine    Tablet 20 milliGRAM(s) Oral daily  polyethylene glycol 3350 17 Gram(s) Oral two times a day  atorvastatin 10 milliGRAM(s) Oral at bedtime  epoetin brenda Injectable 59976 Unit(s) IV Push <User Schedule>  chlorhexidine 2% Cloths 1 Application(s) Topical <User Schedule>  lactobacillus acidophilus 1 Tablet(s) Oral three times a day  sevelamer carbonate 800 milliGRAM(s) Oral three times a day with meals      ICU Vital Signs Last 24 Hrs  T(C): 37 (07 Mar 2020 00:00), Max: 37 (07 Mar 2020 00:00)  T(F): 98.6 (07 Mar 2020 00:00), Max: 98.6 (07 Mar 2020 00:00)  HR: 96 (06 Mar 2020 22:30) (58 - 107)  BP: 115/77 (06 Mar 2020 22:30) (75/52 - 165/71)  BP(mean): 90 (06 Mar 2020 22:30) (59 - 98)  RR: 27 (06 Mar 2020 22:30) (13 - 34)  SpO2: 93% (06 Mar 2020 22:30) (93% - 97%)      I&O's Detail    05 Mar 2020 07:01  -  06 Mar 2020 07:00  --------------------------------------------------------  IN:    Albumin 25%: 100 mL    dexmedetomidine Infusion: 55.8 mL    Lactated Ringers IV Bolus: 500 mL    norepinephrine Infusion: 119.6 mL    Oral Fluid: 600 mL    Solution: 200 mL  Total IN: 1575.4 mL    OUT:    Chest Tube: 235 mL    Indwelling Catheter - Urethral: 670 mL  Total OUT: 905 mL    Total NET: 670.4 mL      06 Mar 2020 07:01  -  07 Mar 2020 02:09  --------------------------------------------------------  IN:    dexmedetomidine Infusion: 9.6 mL    norepinephrine Infusion: 34.2 mL    Oral Fluid: 570 mL    Solution: 100 mL  Total IN: 713.8 mL    OUT:    Chest Tube: 50 mL    Indwelling Catheter - Urethral: 1110 mL  Total OUT: 1160 mL    Total NET: -446.2 mL        LABS:                        8.5    7.85  )-----------( 302      ( 06 Mar 2020 06:06 )             25.5     03-06    132<L>  |  97  |  74<H>  ----------------------------<  132<H>  5.0   |  21<L>  |  6.20<H>    Ca    8.1<L>      06 Mar 2020 06:06  Phos  5.9     03-06  Mg     3.0     03-06    TPro  5.8<L>  /  Alb  2.4<L>  /  TBili  0.9  /  DBili  x   /  AST  34  /  ALT  43  /  AlkPhos  81  03-06        CAPILLARY BLOOD GLUCOSE        PT/INR - ( 06 Mar 2020 06:06 )   PT: 17.5 sec;   INR: 1.54 ratio         PTT - ( 06 Mar 2020 06:06 )  PTT:22.7 sec    CULTURES:  Culture Results:   No growth (03-01 @ 13:24)      Physical Examination:    General: No acute distress, resting comfortably in bed     HEENT: Pupils equal, reactive to light.  Symmetric.    PULM: diminished breath sounds at both lung bases     CVS: Regular rate and rhythm, no murmurs, rubs, or gallops    ABD: Soft, nondistended, nontender, normoactive bowel sounds, no masses    EXT: No edema, nontender    SKIN: Warm and well perfused    Neuro: alert and oriented x 1, confused at times, moves all extr    RADIOLOGY:   < from: CT Chest No Cont (03.01.20 @ 16:56) >  INTERPRETATION:  Clinical information: Left-sided empyema    Comparison exam dated 2/25/2020    Axial images obtained, coronal and sagittal images computer reformatted    Noncontrast exam limits evaluation of hilar and mediastinal regions.    A left-sided pigtail catheter is present in the left pleural space. Loculated effusion with associated dense consolidation is present, the appearance is slightly more prominentsince the prior exam.    No thoracic aortic aneurysm or pericardial effusion. Cardiomegaly present. Coronary artery calcifications identified. The central airway appears intact. The thyroid gland is not enlarged.    Minimal atelectatic change right lung base. Calcification visible in the right pleura. Peribronchial thickening right infrahilar region. No pneumothorax.    The stomach is filled with food and air. A large gallstone is noted. The adrenal glands are not enlarged. The spleen is not enlarged. No acute appearing osseous abnormalities. Soft tissue anchors right humeral head. Air bubbles visible in the subcutaneous soft tissues left lateral lower thorax.    IMPRESSION: A left-sided pigtail catheter is present in the pleural space. Loculated effusion with associated dense consolidation is present, appearance slightly more prominent when compared to prior exam. See History of Present Illness for attending note.

## 2021-06-05 NOTE — CONSULT NOTE ADULT - SUBJECTIVE AND OBJECTIVE BOX
NEUROLOGY CONSULT  HPI: 31y R-handed man w/ h/o congential HIV, GBS, AFib not on AC and reported stroke who p/w acute worsening B/L LE weakness x 2 days a/w fever as well as new change in urinary/bowel pattern.  PT reports last year had initially had developed severe B/L LE weakness and was eventually diagnosed with GBS at [facility] where he underwent treatment and improved.  He was doing well at Rehab and was well supplied with HIV medications as well at which time he was compliant with meds.  In last two months, he has not had regular access to HIV meds and thus has not been taking his Bictarvy.  He recently presented to [facility] for similar worsening paraparesis at which time MRI C/T/L spine and LP were done which were unrevealing (05/2021).  PT was discharged and now presenting 1 week later with 2 days of severe worsening + new fever, urinary urgency w/ incomplete bladder emptying and bowel urgency.  Denies any saddle anesthesia or numbness otherwise.  Is unable to walk.      ROS: As above.  PMH & PSH:  PAST MEDICAL & SURGICAL HISTORY:  HIV (human immunodeficiency virus infection)from birth  Guillain-[place]  Asthma  CVA (cerebral vascular accident)  Closed fracture of multiple ribs of right side, initial encounter  Cocaine abuse  Chronic sinusitis  Homeless  GBS (Guillain [place] syndrome)  Stroke (reported)    PSH:   History of orthopedic surgery left arm    Home Medications:  gabapentin 100 mg oral capsule: 1 tab(s) orally 3 times a day (02 May 2021 04:45)  novotin:  (10 May 2021 10:17)  SEROquel 200 mg oral tablet: 1 tab(s) orally once a day (at bedtime) (26 Oct 2020 13:53)  vitarin:  (10 May 2021 10:17)    Allergies  Ceclor (Unknown)  Toradol (Anaphylaxis; Swelling)  Toradol (Swelling)    Social History: Denies tobacco, endorses social ETOH use and social marijuana use but denies other drugs of abuse including IV     FAMILY HISTORY:  FH: HIV infection (Mother)    OBJECTIVE  Vital Signs Last 24 Hrs  T(C): 36.9 (05 Jun 2021 03:02), Max: 36.9 (04 Jun 2021 23:29)  T(F): 98.4 (05 Jun 2021 03:02), Max: 98.5 (04 Jun 2021 23:29)  HR: 87 (05 Jun 2021 03:02) (87 - 92)  BP: 139/74 (05 Jun 2021 03:02) (139/74 - 142/80)  BP(mean): --  RR: 17 (05 Jun 2021 03:02) (16 - 17)  SpO2: 100% (05 Jun 2021 03:02) (100% - 100%)  Orthostatic VS    Height (cm): 180.3 (06-04)  Weight (kg): 81.6 (05-13)  BMI (kg/m2): 25.1 (06-04)    PHYSICAL EXAM:  MS: AAOx4, anxious and visibly upset  CNs: PERRL. EOMI, face symmetric, no dysarthria, tongue midline.   Cortical: visual fields full, no extinction on double simultaneous stim.   Motor: 5/5 strength throughout UE B/L 2/5 strength B/L hip flexion, 3/5 B/L hip abduction and adduction, 2/5 knee extension., 3/5 dorsiflexion and plantarflexion  Reflexes: B/L 3+ reflexes w/ crossed adductors +, left upgoing toe, R. downgoing.   Sensory: Intact to LT and temp B/L throughout   MSK: Point tenderness to palpation in low mid lumbar spine.    Labs:                        11.6   5.74  )-----------( 285      ( 05 Jun 2021 02:53 )             36.7   PT/INR - ( 05 Jun 2021 02:53 )   PT: 14.1 sec;   INR: 1.24 ratio  PTT - ( 05 Jun 2021 02:53 )  PTT:32.0 sec    HIV-1 RNA Quantitative, Viral Load (05.02.21 @ 06:03) 37,671 Protein  From prior admissions:   05/02/21 CSF  Protein 45 mg/dL   Glucose 48 mg/dL  Culture Results: No fungus isolated after 4 weeks. (05.02.21 @ 15:52)   Culture - CSF with Gram Stain (05.02.21 @ 15:52)   Gram Stain: No polymorphonuclear leukocytes.  No organisms seen by cytocentrifuge   Culture Results: No growth CSF PCR Panel (05.02.21 @ 15:52) VDRL Titer, CSF: Nonreact    05/09/21 CT head: No acute intracranial hemorrhage or mass effect.  05/09/21 CT cervical spine: No CT evidence of cervical spine fracture.    05/21/21 MR Lumbar Spine w/wo IV Cont:   Lumbar alignment is maintained. Vertebral body are normal in height and signal. Mild intervertebral disc height loss at L5-S1. No abnormal enhancement.  Evaluation of individual levels demonstrates:  L1-L2: No significant spinal canal or neuroforaminal narrowing.  L2-L3: No significant spinal canal or neuroforaminal narrowing.  L3-L4: No significant spinal canal or neuroforaminal narrowing.  L4-L5: No significant spinal canal or neuroforaminal narrowing.  L5-S1: Disc bulge. No significant spinal canal or neuroforaminal narrowing.  The conus is normal in position and morphology at the L1 level.    IMPRESSION: No significant spinal canal or neuroforaminal narrowing.        05/02/2021 MRI C/T/L Spine w/wo contrast:  Motion degraded exam. Mild spondylitic changes. No spinal cord or intraspinal abnormality identified. Prominent nasopharyngeal soft tissue likely representing adenoidal hypertrophy. Correlation with direct visualization is advised.

## 2021-06-05 NOTE — CONSULT NOTE ADULT - ASSESSMENT
INCOMPLETE  Assessment:  31y R-HM w h/o HIV, GBS, stroke, Afib (not on AC)and former cocaine abuse p/w acute worsening paraparesis, fever (Tmax 102.7), urinary and bowel urgency and increased frequency w/ incomplete bladder emptying x 2 days.  PT recently had similar presentation at [facility], discharged and now returning in one week later.  Of note, PT reports being off Bictarvi x 2 months and CD4 count 318 as of 10/2020.  On exam, he has 2/5 strength B/L hip flexion, 3/5 B/L hip abduction and adduction, 2/5 knee extension., 3/5 dorsiflexion and plantarflexion, B/L 3+ reflexes w/ crossed adductors +, left upgoing toe and point tenderness to palpation in low mid lumbar spine.  Though recent MRI C/T/L spine in 05/2021 did not show acute pathology, PT now reporting new symptoms urinary and bowel symptoms as well as new fevers.     IMPRESSION: Acute paraparesis a/w neurogenic bladder, hyperreflexia, fever, and point tenderness in lumbar spine in immunocompromised PT concerning for infectious process such as osteomyelitis or epidural abscess.     Plan  []Urgent MRI L/S spine w/wo contrast  []Blood cultures x 2   []Full T cell subset (last done 04/2021) and HIV viral load (Last undetectable and CD4 380 but now off Rx)  []CBC w/ diff, CMP, QuantiFeron gold to screen for TB and associated Pott's disease.  []Screen for HSV,1/2, VZV, CMV, WNV and syphilis, lyme   []Consider Vancomycin dosing empirically while awaiting results.     Yemi Beatty, DO  PGY-3 Neurology Service 31y R-HM w h/o HIV, GBS, stroke, Afib (not on AC)and former cocaine abuse p/w acute worsening paraparesis, fever (Tmax 102.7), urinary and bowel urgency and increased frequency w/ incomplete bladder emptying x 2 days.  PT recently had similar presentation at [facility], discharged and now returning in one week later.  Of note, PT reports being off Bictarvi x 2 months and CD4 count 318 as of 10/2020.  On exam, he has 2/5 strength B/L hip flexion, 3/5 B/L hip abduction and adduction, 2/5 knee extension., 3/5 dorsiflexion and plantarflexion, B/L 3+ reflexes w/ crossed adductors +, left upgoing toe and point tenderness to palpation in low mid lumbar spine.  Though recent MRI C/T/L spine in 05/2021 did not show acute pathology, PT now reporting new symptoms urinary and bowel symptoms as well as new fevers.     IMPRESSION: Acute paraparesis a/w neurogenic bladder, hyperreflexia, fever, and point tenderness in lumbar spine in immunocompromised PT concerning for infectious process such as osteomyelitis or epidural abscess.     Plan  []Urgent MRI L/S spine w/wo contrast  []Blood cultures x 2   []Full T cell subset (last done 04/2021) and HIV viral load (Last undetectable and CD4 380 but now off Rx)  []CBC w/ diff, CMP, QuantiFeron gold to screen for TB and associated Pott's disease.  []Screen for HSV,1/2, VZV, CMV, WNV and syphilis, lyme   []Consider Vancomycin dosing empirically while awaiting results.     Yemi Beatty, DO  PGY-3 Neurology Service

## 2021-06-05 NOTE — H&P ADULT - ASSESSMENT
31 year old man,  with congential HIV, GBS, AFib not on AC and reported stroke who p/w acute worsening B/L LE weakness x 2 days a/w fever (102 fever yesterday) as well as new change in urinary/bowel pattern, c/f GBS flare vs osteomyelitis vs epidural abscess.  31 year old man,  with congential HIV, GBS, AFib not on AC and reported stroke who p/w acute worsening B/L LE weakness x 2 days a/w fever (102 fever yesterday) as well as new change in urinary, c/f GBS flare vs osteomyelitis vs epidural abscess.

## 2021-06-05 NOTE — ED PROVIDER NOTE - PROGRESS NOTE DETAILS
MRI tech called several times with no  response. Radiology resident paged x2; awaiting response. Spoke to radiology resident who agrees to call MRI tech. MRI with no signs of epidural abscess or cord compression. Will admit to medicine for inability to ambulate. Job: Patient was admitted prior to my arrival. Neurology team recommended MRI with and without contrast Brain, cervical, thoracic spine. I discussed the case with hospitalist admin Dr. Cross, made her aware of neuro reccs, she states she will make sure orders are placed, images are followed and will manage ongoing care.

## 2021-06-05 NOTE — H&P ADULT - NSICDXPASTMEDICALHX_GEN_ALL_CORE_FT
PAST MEDICAL HISTORY:  Asthma     Chronic sinusitis     Closed fracture of multiple ribs of right side, initial encounter     Cocaine abuse     CVA (cerebral vascular accident)     GBS (Guillain [place] syndrome)     HIV (human immunodeficiency virus infection) from birth    Homeless

## 2021-06-06 ENCOUNTER — TRANSCRIPTION ENCOUNTER (OUTPATIENT)
Age: 31
End: 2021-06-06

## 2021-06-06 LAB
ALBUMIN SERPL ELPH-MCNC: 3.8 G/DL — SIGNIFICANT CHANGE UP (ref 3.3–5)
ALP SERPL-CCNC: 79 U/L — SIGNIFICANT CHANGE UP (ref 40–120)
ALT FLD-CCNC: 7 U/L — SIGNIFICANT CHANGE UP (ref 4–41)
AMPHET UR-MCNC: NEGATIVE — SIGNIFICANT CHANGE UP
ANION GAP SERPL CALC-SCNC: 13 MMOL/L — SIGNIFICANT CHANGE UP (ref 7–14)
APAP SERPL-MCNC: <15 UG/ML — SIGNIFICANT CHANGE UP (ref 15–25)
AST SERPL-CCNC: 16 U/L — SIGNIFICANT CHANGE UP (ref 4–40)
B BURGDOR C6 AB SER-ACNC: NEGATIVE — SIGNIFICANT CHANGE UP
B BURGDOR IGG+IGM SER-ACNC: 0.04 INDEX — SIGNIFICANT CHANGE UP (ref 0.01–0.89)
BARBITURATES UR SCN-MCNC: NEGATIVE — SIGNIFICANT CHANGE UP
BASOPHILS # BLD AUTO: 0.01 K/UL — SIGNIFICANT CHANGE UP (ref 0–0.2)
BASOPHILS NFR BLD AUTO: 0.3 % — SIGNIFICANT CHANGE UP (ref 0–2)
BENZODIAZ UR-MCNC: NEGATIVE — SIGNIFICANT CHANGE UP
BILIRUB SERPL-MCNC: 0.3 MG/DL — SIGNIFICANT CHANGE UP (ref 0.2–1.2)
BUN SERPL-MCNC: 10 MG/DL — SIGNIFICANT CHANGE UP (ref 7–23)
CALCIUM SERPL-MCNC: 8.9 MG/DL — SIGNIFICANT CHANGE UP (ref 8.4–10.5)
CHLORIDE SERPL-SCNC: 104 MMOL/L — SIGNIFICANT CHANGE UP (ref 98–107)
CMV IGM FLD-ACNC: <8 AU/ML — SIGNIFICANT CHANGE UP
CMV IGM SERPL QL: NEGATIVE — SIGNIFICANT CHANGE UP
CO2 SERPL-SCNC: 21 MMOL/L — LOW (ref 22–31)
COCAINE METAB.OTHER UR-MCNC: POSITIVE
CREAT SERPL-MCNC: 1.03 MG/DL — SIGNIFICANT CHANGE UP (ref 0.5–1.3)
CREATININE URINE RESULT, DAU: 241 MG/DL — SIGNIFICANT CHANGE UP
EOSINOPHIL # BLD AUTO: 0.13 K/UL — SIGNIFICANT CHANGE UP (ref 0–0.5)
EOSINOPHIL NFR BLD AUTO: 3.8 % — SIGNIFICANT CHANGE UP (ref 0–6)
ETHANOL SERPL-MCNC: <10 MG/DL — SIGNIFICANT CHANGE UP
GLUCOSE SERPL-MCNC: 93 MG/DL — SIGNIFICANT CHANGE UP (ref 70–99)
HCT VFR BLD CALC: 37.7 % — LOW (ref 39–50)
HGB BLD-MCNC: 12.1 G/DL — LOW (ref 13–17)
IANC: 1.1 K/UL — LOW (ref 1.5–8.5)
IMM GRANULOCYTES NFR BLD AUTO: 0 % — SIGNIFICANT CHANGE UP (ref 0–1.5)
LYMPHOCYTES # BLD AUTO: 1.75 K/UL — SIGNIFICANT CHANGE UP (ref 1–3.3)
LYMPHOCYTES # BLD AUTO: 51.6 % — HIGH (ref 13–44)
MAGNESIUM SERPL-MCNC: 1.4 MG/DL — LOW (ref 1.6–2.6)
MCHC RBC-ENTMCNC: 28.9 PG — SIGNIFICANT CHANGE UP (ref 27–34)
MCHC RBC-ENTMCNC: 32.1 GM/DL — SIGNIFICANT CHANGE UP (ref 32–36)
MCV RBC AUTO: 90.2 FL — SIGNIFICANT CHANGE UP (ref 80–100)
METHADONE UR-MCNC: NEGATIVE — SIGNIFICANT CHANGE UP
MONOCYTES # BLD AUTO: 0.4 K/UL — SIGNIFICANT CHANGE UP (ref 0–0.9)
MONOCYTES NFR BLD AUTO: 11.8 % — SIGNIFICANT CHANGE UP (ref 2–14)
NEUTROPHILS # BLD AUTO: 1.1 K/UL — LOW (ref 1.8–7.4)
NEUTROPHILS NFR BLD AUTO: 32.5 % — LOW (ref 43–77)
NRBC # BLD: 0 /100 WBCS — SIGNIFICANT CHANGE UP
NRBC # FLD: 0 K/UL — SIGNIFICANT CHANGE UP
OPIATES UR-MCNC: NEGATIVE — SIGNIFICANT CHANGE UP
OXYCODONE UR-MCNC: NEGATIVE — SIGNIFICANT CHANGE UP
PCP SPEC-MCNC: SIGNIFICANT CHANGE UP
PCP UR-MCNC: NEGATIVE — SIGNIFICANT CHANGE UP
PHOSPHATE SERPL-MCNC: 4 MG/DL — SIGNIFICANT CHANGE UP (ref 2.5–4.5)
PLATELET # BLD AUTO: 266 K/UL — SIGNIFICANT CHANGE UP (ref 150–400)
POTASSIUM SERPL-MCNC: 3.3 MMOL/L — LOW (ref 3.5–5.3)
POTASSIUM SERPL-SCNC: 3.3 MMOL/L — LOW (ref 3.5–5.3)
PROT SERPL-MCNC: 7.2 G/DL — SIGNIFICANT CHANGE UP (ref 6–8.3)
RBC # BLD: 4.18 M/UL — LOW (ref 4.2–5.8)
RBC # FLD: 13.6 % — SIGNIFICANT CHANGE UP (ref 10.3–14.5)
SALICYLATES SERPL-MCNC: <5 MG/DL — LOW (ref 15–30)
SODIUM SERPL-SCNC: 138 MMOL/L — SIGNIFICANT CHANGE UP (ref 135–145)
T PALLIDUM AB TITR SER: NEGATIVE — SIGNIFICANT CHANGE UP
THC UR QL: POSITIVE
TOXICOLOGY SCREEN, DRUGS OF ABUSE, SERUM RESULT: SIGNIFICANT CHANGE UP
VZV IGG FLD QL IA: 996.5 INDEX — SIGNIFICANT CHANGE UP
VZV IGG FLD QL IA: POSITIVE — SIGNIFICANT CHANGE UP
WBC # BLD: 3.39 K/UL — LOW (ref 3.8–10.5)
WBC # FLD AUTO: 3.39 K/UL — LOW (ref 3.8–10.5)

## 2021-06-06 PROCEDURE — 99223 1ST HOSP IP/OBS HIGH 75: CPT

## 2021-06-06 PROCEDURE — 99233 SBSQ HOSP IP/OBS HIGH 50: CPT | Mod: GC

## 2021-06-06 RX ORDER — MAGNESIUM SULFATE 500 MG/ML
2 VIAL (ML) INJECTION ONCE
Refills: 0 | Status: COMPLETED | OUTPATIENT
Start: 2021-06-06 | End: 2021-06-06

## 2021-06-06 RX ORDER — POTASSIUM CHLORIDE 20 MEQ
40 PACKET (EA) ORAL ONCE
Refills: 0 | Status: COMPLETED | OUTPATIENT
Start: 2021-06-06 | End: 2021-06-06

## 2021-06-06 RX ADMIN — GABAPENTIN 300 MILLIGRAM(S): 400 CAPSULE ORAL at 05:29

## 2021-06-06 RX ADMIN — Medication 10 MILLIGRAM(S): at 05:29

## 2021-06-06 RX ADMIN — Medication 50 GRAM(S): at 09:11

## 2021-06-06 RX ADMIN — GABAPENTIN 300 MILLIGRAM(S): 400 CAPSULE ORAL at 21:15

## 2021-06-06 RX ADMIN — ENOXAPARIN SODIUM 40 MILLIGRAM(S): 100 INJECTION SUBCUTANEOUS at 12:21

## 2021-06-06 RX ADMIN — QUETIAPINE FUMARATE 200 MILLIGRAM(S): 200 TABLET, FILM COATED ORAL at 21:15

## 2021-06-06 RX ADMIN — GABAPENTIN 300 MILLIGRAM(S): 400 CAPSULE ORAL at 12:21

## 2021-06-06 RX ADMIN — Medication 10 MILLIGRAM(S): at 17:11

## 2021-06-06 RX ADMIN — BICTEGRAVIR SODIUM, EMTRICITABINE, AND TENOFOVIR ALAFENAMIDE FUMARATE 1 TABLET(S): 30; 120; 15 TABLET ORAL at 12:21

## 2021-06-06 RX ADMIN — Medication 40 MILLIEQUIVALENT(S): at 09:11

## 2021-06-06 NOTE — PROGRESS NOTE ADULT - PROBLEM SELECTOR PLAN 3
-patient noncompliant on medications d/t lack of access (when asked, patient states he "does not know where to get them").   -f/u HIV viral load, CD4 count  -consider resuming HAART, concern for immune reconstitution inflammatory syndrome, however unlikely as patient was taking them during last hospital admission in May, (will follow up CD4 count, IRIS uncommon in individuals with CD4 >100).   - ID consulted, awaiting recommendations at least to establish continuity, ensure regimen is appropriate for patient.   - f/u blood cultures (patient not septic on presentation, reported fevers at home)

## 2021-06-06 NOTE — PROGRESS NOTE ADULT - PROBLEM SELECTOR PLAN 4
patient reports diagnosis of afib approximately 1 year ago, reported stroke but no residual deficits, no documentation from prior hospital visits of afib diagnosis, stroke worked up but was negative  -continue to monitor for now, hold off AC

## 2021-06-06 NOTE — PROGRESS NOTE ADULT - SUBJECTIVE AND OBJECTIVE BOX
Authored by Park Alfaro MD, PGY1  PATIENT:  NELSON MITCHELL  [id number]    CHIEF COMPLAINT:  Patient is a 31y old  Male who presents with a chief complaint of Bilateral LE weakness (2021 10:52)      INTERVAL HISTORY OVERNIGHT EVENTS: DERICK overnight.         MEDICATIONS:  MEDICATIONS  (STANDING):  baclofen 10 milliGRAM(s) Oral two times a day  bictegravir 50 mG/emtricitabine 200 mG/tenofovir alafenamide 25 mG (BIKTARVY) 1 Tablet(s) Oral daily  enoxaparin Injectable 40 milliGRAM(s) SubCutaneous daily  gabapentin 300 milliGRAM(s) Oral three times a day  QUEtiapine 200 milliGRAM(s) Oral at bedtime    MEDICATIONS  (PRN):      ALLERGIES:  Allergies    Ceclor (Unknown)  Toradol (Anaphylaxis; Swelling)  Toradol (Swelling)    Intolerances        OBJECTIVE:  ICU Vital Signs Last 24 Hrs  T(C): 36.4 (2021 05:28), Max: 36.5 (2021 11:39)  T(F): 97.5 (2021 05:28), Max: 97.7 (2021 11:39)  HR: 55 (2021 05:28) (55 - 64)  BP: 115/65 (2021 05:28) (115/65 - 119/66)  BP(mean): --  ABP: --  ABP(mean): --  RR: 17 (2021 05:28) (17 - 18)  SpO2: 100% (2021 05:28) (99% - 100%)        I&O's Summary    2021 07:01  -  2021 07:00  --------------------------------------------------------  IN: 0 mL / OUT: 200 mL / NET: -200 mL      PHYSICAL EXAMINATION:  MS: AAOx4, fatigued  CNs: PERRL. EOMI, face symmetric, no dysarthria, tongue midline.   Cortical: visual fields full, no extinction on double simultaneous stim.   Motor: 5/5 strength throughout UE B/L 2/5 strength B/L hip flexion, 3/5 B/L hip abduction and adduction, 1/5 knee extension., 1/5 dorsiflexion and plantarflexion  Reflexes: B/L 3+ reflexes w/ crossed adductors +, left upgoing toe, R. downgoing.   Sensory: Intact to LT and temp B/L throughout   MSK: Point tenderness to palpation in low mid lumbar spine.    LABS:                          11.6   5.74  )-----------( 285      ( 2021 02:53 )             36.7     06-    140  |  103  |  12  ----------------------------<  69<L>  3.6   |  21<L>  |  0.96    Ca    9.3      2021 02:53    TPro  7.6  /  Alb  4.2  /  TBili  0.6  /  DBili  x   /  AST  22  /  ALT  11  /  AlkPhos  78  06-05    LIVER FUNCTIONS - ( 2021 02:53 )  Alb: 4.2 g/dL / Pro: 7.6 g/dL / ALK PHOS: 78 U/L / ALT: 11 U/L / AST: 22 U/L / GGT: x           PT/INR - ( 2021 02:53 )   PT: 14.1 sec;   INR: 1.24 ratio         PTT - ( 2021 02:53 )  PTT:32.0 sec    CARDIAC MARKERS ( 2021 02:53 )  x     / x     / 227 U/L / x     / x          Urinalysis Basic - ( 2021 08:43 )    Color: Yellow / Appearance: Clear / S.035 / pH: x  Gluc: x / Ketone: Trace  / Bili: Negative / Urobili: <2 mg/dL   Blood: x / Protein: 30 mg/dL / Nitrite: Negative   Leuk Esterase: Negative / RBC: <5 /HPF / WBC <5 /HPF   Sq Epi: x / Non Sq Epi: Occasional / Bacteria: Occasional    Lyme C6 Interpretation: Negative: Treponema Pallidum Antibody Interpretation: NegativeCMV IgM Interpretation: NegativeVaricella IgG Interpretation: Positive:T4, Serum: 5.83 ug/dL (21 @ 15:23) Thyroid Stimulating Hormone, Serum: 0.29 uIU/mL (21 @ 15:23) Folate, Serum: 19.6 ng/mL (21 @ 15:23) Vitamin B12, Serum: 343 pg/mL (21 @ 15:23) C-Reactive Protein, Serum: <4.0 mg/L (21 @ 15:23) Sedimentation Rate, Erythrocyte: 25 mm/hr (21 @ 15:23)     TELEMETRY:     EKG:     IMAGING:       Authored by Park Alfaro MD, PGY1  PATIENT:  NELSON MITCHELL  [id number]    CHIEF COMPLAINT:  Patient is a 31y old  Male who presents with a chief complaint of Bilateral LE weakness (2021 10:52)      INTERVAL HISTORY OVERNIGHT EVENTS: DERICK overnight. This AM, patient fatigued, but arousable, AOx3. Continue c/o LE weakness, urinary hesitancy. No fevers, no chest pain.         MEDICATIONS:  MEDICATIONS  (STANDING):  baclofen 10 milliGRAM(s) Oral two times a day  bictegravir 50 mG/emtricitabine 200 mG/tenofovir alafenamide 25 mG (BIKTARVY) 1 Tablet(s) Oral daily  enoxaparin Injectable 40 milliGRAM(s) SubCutaneous daily  gabapentin 300 milliGRAM(s) Oral three times a day  QUEtiapine 200 milliGRAM(s) Oral at bedtime    MEDICATIONS  (PRN):      ALLERGIES:  Allergies    Ceclor (Unknown)  Toradol (Anaphylaxis; Swelling)  Toradol (Swelling)    Intolerances        OBJECTIVE:  ICU Vital Signs Last 24 Hrs  T(C): 36.4 (2021 05:28), Max: 36.5 (2021 11:39)  T(F): 97.5 (2021 05:28), Max: 97.7 (2021 11:39)  HR: 55 (2021 05:28) (55 - 64)  BP: 115/65 (2021 05:28) (115/65 - 119/66)  BP(mean): --  ABP: --  ABP(mean): --  RR: 17 (2021 05:28) (17 - 18)  SpO2: 100% (2021 05:28) (99% - 100%)        I&O's Summary    2021 07:01  -  2021 07:00  --------------------------------------------------------  IN: 0 mL / OUT: 200 mL / NET: -200 mL      PHYSICAL EXAMINATION:  MS: AAOx4, fatigued  CNs: PERRL. EOMI, face symmetric, no dysarthria, tongue midline.   Cortical: visual fields full, no extinction on double simultaneous stim.   Motor: 5/5 strength throughout UE B/L 2/5 strength B/L hip flexion, 3/5 B/L hip abduction and adduction, 1/5 knee extension., 1/5 dorsiflexion and plantarflexion  Reflexes: B/L 3+ reflexes w/ crossed adductors +, left upgoing toe, R. downgoing.   Sensory: Intact to LT and temp B/L throughout   MSK: Point tenderness to palpation in low mid lumbar spine.    LABS:                          11.6   5.74  )-----------( 285      ( 2021 02:53 )             36.7     06-05    140  |  103  |  12  ----------------------------<  69<L>  3.6   |  21<L>  |  0.96    Ca    9.3      2021 02:53    TPro  7.6  /  Alb  4.2  /  TBili  0.6  /  DBili  x   /  AST  22  /  ALT  11  /  AlkPhos  78  06-05    LIVER FUNCTIONS - ( 2021 02:53 )  Alb: 4.2 g/dL / Pro: 7.6 g/dL / ALK PHOS: 78 U/L / ALT: 11 U/L / AST: 22 U/L / GGT: x           PT/INR - ( 2021 02:53 )   PT: 14.1 sec;   INR: 1.24 ratio         PTT - ( 2021 02:53 )  PTT:32.0 sec    CARDIAC MARKERS ( 2021 02:53 )  x     / x     / 227 U/L / x     / x          Urinalysis Basic - ( 2021 08:43 )    Color: Yellow / Appearance: Clear / S.035 / pH: x  Gluc: x / Ketone: Trace  / Bili: Negative / Urobili: <2 mg/dL   Blood: x / Protein: 30 mg/dL / Nitrite: Negative   Leuk Esterase: Negative / RBC: <5 /HPF / WBC <5 /HPF   Sq Epi: x / Non Sq Epi: Occasional / Bacteria: Occasional    Lyme C6 Interpretation: Negative: Treponema Pallidum Antibody Interpretation: NegativeCMV IgM Interpretation: NegativeVaricella IgG Interpretation: Positive:T4, Serum: 5.83 ug/dL (21 @ 15:23) Thyroid Stimulating Hormone, Serum: 0.29 uIU/mL (21 @ 15:23) Folate, Serum: 19.6 ng/mL (21 @ 15:23) Vitamin B12, Serum: 343 pg/mL (21 @ 15:23) C-Reactive Protein, Serum: <4.0 mg/L (21 @ 15:23) Sedimentation Rate, Erythrocyte: 25 mm/hr (21 @ 15:23)     TELEMETRY:     EKG:     IMAGING:

## 2021-06-06 NOTE — PROGRESS NOTE ADULT - ASSESSMENT
31 year old man,  with congential HIV, GBS, AFib not on AC and reported stroke who p/w acute worsening B/L LE weakness x 2 days a/w fever (102 fever yesterday) as well as new change in urinary, c/f GBS flare vs osteomyelitis vs epidural abscess.

## 2021-06-06 NOTE — DISCHARGE NOTE PROVIDER - NSFOLLOWUPCLINICS_GEN_ALL_ED_FT
[facility]  Internal Medicine  [address] [place] [address]  Phone: [phone]  Fax: [phone]

## 2021-06-06 NOTE — DISCHARGE NOTE PROVIDER - NSDCFUADDINST_GEN_ALL_CORE_FT
HIV Clinic   [address] [facility] [address] HIV Clinic   [address] [facility] [address] [phone]     Please make sure to follow up with dental as well  HIV Clinic with Dr Easton Hunter  [address] [facility] [address]  [phone]  Thursday, July 1 2021 at 10:00 am     Please make sure to follow up with dental as well

## 2021-06-06 NOTE — PROGRESS NOTE ADULT - ATTENDING COMMENTS
31yr old undomiciled man PMH congenital HIV (nonadherent with Biktarvy, viral load 5/2 37K, unclear CD4 count), GBS (dx one year ago responded to tx and PT, with residual LE weakness), ?afib/prior CVA (patient reports in 2020), polysubstance abuse presents with worsening b/l LE weakness and urinary urgency.     Patient with multiple visits to local hospitals over the past few months with similar complaints including [facility], [facility], [facility], [facility], [place], [place], [facility]. Recently he was at [facility] (last seen 5/26) and neuro workup with CTH, LP, and MR C/T/L spine were negative and patient was evaluated by neuro who stated there was low suspicion for GBS and was awaiting DC to rehab but when transfer arrived to take patient to rehab, he had eloped.     Utox + for cocaine and THC  MR Lumbar spine - no evidence of large epidural abscess or central canal stenosis. Can obtain MRI Brain, unclear if benefit in repeating scans.  Suspect possible malingering as patient states he cannot move his legs but then seen bending his knees and was found asleep resting comfortably but when being interviewed, moaning and reports back spasms.   F/u rest of infectious workup: quant gold, HSV, VZV, CMV, WNV, Syphillis, Lyme, Blood cultures  Patient with poor adherence to Biktarvy, usually receives during hospitalizations (last 5/26), HIV viral load/CD4 count sent, continue Biktarvy for now.   Needs good outpatient follow up for HIV  PT eval

## 2021-06-06 NOTE — CONSULT NOTE ADULT - ATTENDING COMMENTS
Neurology Attending Coverage 6/5/21  Patient seen and examined in rounds with resident housestaff.    Patient presents with complaints of worsening LE paraparesis and urinary urgency.    Recent MR C/T/L w/wo contrast unremarkable.  MR L spine w/wo repeated yesterday and was WNL.  LP was unremarkable.  Exam reveals normal upper extremity strength, however increased tone, hyperreflexia in his bilateral LE, and little movement (effort?) in his bilateral LE.  In light of his history of HIV, subjective worsening of his complaints, and findings on exam, would complete his workup by repeating an MR C/T w/wo contrast and obtaining an MR brain w/wo.
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    here has remained afebrile. Still with LE weakness.  MRI L-spine unremarkable. Now pending MRI C/T spine and head.  Uncontrolled HIV, undomiciled, does not follow with an HIV specialist at this time. Off ARTs for about two months. Receives his ARTs when discharged from hospital setting. Has no cell phone.  C/o right sided tooth pain.    Recommend:  #LE weakness  -F/U MRI C/T spine and MRI head  -Neurology following  -Nontoxic appearing, leukopenic on lab, afebrile   -F/U blood cultures    #HIV  -Continue Biktarvy  -On discharge, can follow up at [facility] [place] care.  -HIV VL/ T cell count  -F/U CMV, syphilis, Quant Tb gold  -Check acute viral hepatitis panel  -STD check - urine gonorrhea/ chlamydia    #Right sided dental pain  -Given inability to make doctor's appointments- would have dental see him here    Easton Hunter MD  Pager [phone]  After 5pm/weekends call [phone]

## 2021-06-06 NOTE — PROGRESS NOTE ADULT - PROBLEM SELECTOR PLAN 6
Social work consult, history of homelessness, patient not homeless right now. Lack of access to medications.   -f/u UTox, c/w seroquel    Transitions of Care Status:  1.  Name of PCP:  2.  PCP Contacted on Admission: [ ] Y    [ ] N    3.  PCP contacted at Discharge: [ ] Y    [ ] N    [ ] N/A  4.  Post-Discharge Appointment Date and Location:  5.  Summary of Handoff given to PCP:

## 2021-06-06 NOTE — PROGRESS NOTE ADULT - PROBLEM SELECTOR PLAN 1
-neurology onboard, appreciate recommendations  -MRI L/S spine w/wo contrast limited by motion artifact, benign (negative for OM, abscess)  -f/u MR brain, cervical and thoracic spine, repeat lumbar spine 2/2 initial imaging limitations?  -f/u screen for HSV,1/2, VZV (+former infection), CMV (negative), WNV and syphilis (negative), lyme (negative), B12 (wnl), folate (wnl).   -PT consult

## 2021-06-06 NOTE — DISCHARGE NOTE PROVIDER - HOSPITAL COURSE
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  Patient reports last year had initially had developed severe bilateral LE weakness and was eventually diagnosed with GBS at [place] where he underwent treatment and improved.  He was doing well at Rehab and was well supplied with HIV medications as well at which time he was compliant with meds.  In last two months, he has not had regular access to HIV meds and thus has not been taking his Bictarvy. He recently presented to [facility] for similar worsening paraparesis at which time MRI C/T/L spine and LP were done which were unrevealing (05/2021).      On ROS, patient endorses bifrontal headache, denies visual changes, blurry vision, fever, chest pain,  saddle anesthesia or numbness otherwise, fecal incontinence.     In the ED, rectal Tmax 98.4, HR 75-92, -142/57-80, RR 16-17, 100% on room air. Patient given 1x tylenol and 1xativan. Neurology consulted, MR lumbar spine benign. Patient pending MRI brain, cervical and thoracic spine. Patint underwent screening for HSV,1/2, VZV (+former infection), CMV (negative), WNV and syphilis (negative), lyme (negative), B12 (wnl), folate (wnl).     ID consulted for HIV treatment, resumed Biktarvy. 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  Patient reports last year had initially had developed severe bilateral LE weakness and was eventually diagnosed with GBS at [place] where he underwent treatment and improved.  He was doing well at Rehab and was well supplied with HIV medications as well at which time he was compliant with meds.  In last two months, he has not had regular access to HIV meds and thus has not been taking his Bictarvy. He recently presented to [facility] for similar worsening paraparesis at which time MRI C/T/L spine and LP were done which were unrevealing (05/2021).  At the time patient was set up to be discharged to rehab and left facility AMA    ED Course and Hospital Course:   In the ED, rectal Tmax 98.4, HR 75-92, -142/57-80, RR 16-17, 100% on room air. Patient given 1x tylenol and 1xativan. Neurology consulted, MR lumbar spine benign. Patient was ordered for  MRI brain, cervical and thoracic spine but refused it 6/9/21 even with ativan 4 mg IV to help with his anxiety. Patiet underwent screening for HSV,1/2, VZV (+former infection), CMV (negative), WNV and syphilis (negative), lyme (negative), B12 (wnl), folate (wnl). Patient was resumed on HIV med Biktarvy and set up for ID follow up with Dr. Hunter at HIV clinic.     Overtime patient's LE weakness improved. Patient was complaining of Rt tooth pain, dental was consulted and patient underwent tooth extraction. Patient approved for discharge to rehab. 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  Patient reports last year had initially had developed severe bilateral LE weakness and was eventually diagnosed with GBS at [place] where he underwent treatment and improved.  He was doing well at Rehab and was well supplied with HIV medications as well at which time he was compliant with meds.  In last two months, he has not had regular access to HIV meds and thus has not been taking his Bictarvy. He recently presented to [facility] for similar worsening paraparesis at which time MRI C/T/L spine and LP were done which were unrevealing (05/2021).  At the time patient was set up to be discharged to rehab and left facility AMA    ED Course and Hospital Course:   In the ED, rectal Tmax 98.4, HR 75-92, -142/57-80, RR 16-17, 100% on room air. Patient given 1x tylenol and 1xativan. Neurology consulted, MR lumbar spine benign. Patient was ordered for  MRI brain, cervical and thoracic spine but refused it 6/9/21 even with ativan 4 mg IV to help with his anxiety. Patiet underwent screening for HSV,1/2, VZV (+former infection), CMV (negative), WNV and syphilis (negative), lyme (negative), B12 (wnl), folate (wnl). Patient was resumed on HIV med Biktarvy and set up for ID follow up with Dr. Hunter at HIV clinic.     Overtime patient's LE weakness improved. Patient was complaining of Rt tooth pain, dental was consulted and patient underwent tooth eval/cleaning. Patient approved for discharge to rehab. Patient will receive HIV follow up with [place] [facility] [place]. Patient's lab work were wnl, all enfectious work-up thus far negative, including blood cx, CMV, STDs, etc. Patient hemodynamically stable for discharge. 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  Patient reports last year had initially had developed severe bilateral LE weakness and was eventually diagnosed with GBS at [place] where he underwent treatment and improved.  He was doing well at Rehab and was well supplied with HIV medications as well at which time he was compliant with meds.  In last two months, he has not had regular access to HIV meds and thus has not been taking his Bictarvy. He recently presented to [facility] for similar worsening paraparesis at which time MRI C/T/L spine and LP were done which were unrevealing (05/2021).  At the time patient was set up to be discharged to rehab and left facility AMA    ED Course and Hospital Course:   In the ED, rectal Tmax 98.4, HR 75-92, -142/57-80, RR 16-17, 100% on room air. Patient given 1x tylenol and 1xativan. Neurology consulted, MR lumbar spine benign. Patient was ordered for  MRI brain, cervical and thoracic spine but refused it 6/9/21 even with ativan 4 mg IV to help with his anxiety. Patiet underwent screening for HSV,1/2, VZV (+former infection), CMV (negative), WNV and syphilis (negative), lyme (negative), B12 (wnl), folate (wnl). Patient was resumed on HIV med Biktarvy and set up for ID follow up with Dr. Hunter at HIV clinic.     Overtime patient's LE weakness improved. Patient was complaining of Rt tooth pain, dental was consulted and patient underwent tooth eval/I&D and tooth extraction. Patient also complained of bump on right calf found to be small amount of cystic fluid, advised to monitor and use warm compresses.  Patient approved for discharge to rehab. Patient will receive HIV follow [place] [facility]. Patient's lab work were wnl, all infectious work-up thus far negative, including blood cx, CMV, STDs, etc. Patient hemodynamically stable for discharge. 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  Patient reports last year had initially had developed severe bilateral LE weakness and was eventually diagnosed with GBS at [place] where he underwent treatment and improved.  He was doing well at Rehab and was well supplied with HIV medications as well at which time he was compliant with meds.  In last two months, he has not had regular access to HIV meds and thus has not been taking his Bictarvy. He recently presented to [facility] for similar worsening paraparesis at which time MRI C/T/L spine and LP were done which were unrevealing (05/2021).  At the time patient was set up to be discharged to rehab and left facility AMA    ED Course and Hospital Course:   In the ED, rectal Tmax 98.4, HR 75-92, -142/57-80, RR 16-17, 100% on room air. Patient given 1x tylenol and 1xativan. Neurology consulted, MR lumbar spine benign. Patient was ordered for  MRI brain, cervical and thoracic spine but refused it 6/9/21 even with ativan 4 mg IV to help with his anxiety. Patiet underwent screening for HSV,1/2, VZV (+former infection), CMV (negative), WNV and syphilis (negative), lyme (negative), B12 (wnl), folate (wnl). Patient was resumed on HIV med Biktarvy and set up for ID follow up with Dr. Hunter at HIV clinic.     Overtime patient's LE weakness improved. Patient was complaining of Rt tooth pain, dental was consulted and patient underwent tooth eval/I&D and tooth extraction. Patient taking Augmentin PO for 7 days for tooth infection, last day will be 6/20/21. Patient also complained of bump on right calf found to be small amount of cystic fluid, advised to monitor and use warm compresses.  Patient approved for discharge to rehab. Patient will receive HIV follow up with HIV clinic [place] [facility] [place]. Patient's lab work were wnl, all infectious work-up thus far negative, including blood cx, CMV, STDs, etc. Patient hemodynamically stable for discharge.

## 2021-06-06 NOTE — DISCHARGE NOTE PROVIDER - NSDCMRMEDTOKEN_GEN_ALL_CORE_FT
baclofen 10 mg oral tablet: 1 tab(s) orally 2 times a day  Biktarvy oral tablet: 1 tab(s) orally once a day  gabapentin 300 mg oral capsule: 1 cap(s) orally 3 times a day  SEROquel 200 mg oral tablet: 1 tab(s) orally once a day (at bedtime)   baclofen 10 mg oral tablet: 1 tab(s) orally 2 times a day  Biktarvy oral tablet: 1 tab(s) orally once a day  gabapentin 300 mg oral capsule: 1 cap(s) orally 3 times a day  Melatonin 3 mg oral tablet: 1 tab(s) orally once a day (at bedtime)  SEROquel 200 mg oral tablet: 1 tab(s) orally once a day (at bedtime)   ammonium lactate 12% topical lotion: 1 application topically every 12 hours  amoxicillin-clavulanate 875 mg-125 mg oral tablet: 1 tab(s) orally every 12 hours  baclofen 10 mg oral tablet: 1 tab(s) orally 2 times a day  Biktarvy oral tablet: 1 tab(s) orally once a day  diphenhydramine/lidocaine/aluminum hydroxide/magnesium hydroxide/simethicone mucous membrane suspension: 1 application mucous membrane 2 times a day  gabapentin 300 mg oral capsule: 1 cap(s) orally 3 times a day  Melatonin 3 mg oral tablet: 1 tab(s) orally once a day (at bedtime)  SEROquel 200 mg oral tablet: 1 tab(s) orally once a day (at bedtime)

## 2021-06-06 NOTE — DISCHARGE NOTE PROVIDER - CARE PROVIDER_API CALL
Easton Hunter)  Internal Medicine  [address] [place] [address]  Phone: [phone]  Fax: [phone]  Follow Up Time:

## 2021-06-06 NOTE — PHYSICAL THERAPY INITIAL EVALUATION ADULT - PERTINENT HX OF CURRENT PROBLEM, REHAB EVAL
Patient is 31 year old male admitted with history of congenital HIV, GBS, presents with acute worsening bilateral LE weakness.

## 2021-06-06 NOTE — CONSULT NOTE ADULT - ASSESSMENT
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     #LE weakness, uncontrolled HIV, hx of Guillaine Leon Syndrome - L-S MR is negative and spine MR 1 month ago was unremarkable along with LP. DDx include recurrence of GBS vs HIV or drug-related myopathy.   Currently non toxic appearing with leukepenia and anemia on labs.   #R toot pain - no active drainage but with reported fever at home, could consider as possible cause.    Rec:   - agree with biktarvy  - f/u viral load, t cells, syphilis screen, BCx, quant gold, CMV, VZV pcr  - f/u pending MRIs and neuro recs  - rec hep b and hep c screening  - can check urine chlamydia/ gonorrhea for screening  - rec dental eval for R tooth pain  - need good outpatient follow up for HIV - can f/u with ID clinic at [address]    Anson Wharton MD  Fellow, Infectious Diseases, PGY-4  Pager: [phone]  Before 9am or after 5pm/Weekends: Call [phone]

## 2021-06-06 NOTE — CONSULT NOTE ADULT - SUBJECTIVE AND OBJECTIVE BOX
Patient is a 31y old  Male who presents with a chief complaint of Bilateral LE weakness (2021 07:27)    HPI:  31 year old man,  with congential HIV, GBS diagnosed one year ago responded to treatment and PT (now with residual LE weakness, requires cane intermittentl), who p/w acute worsening B/L LE weakness x 2 days causing inability to walk a/w fever (102 fever yesterday) as well as new urinary urge and difficulty emptying bladder.  Patient reports last year had initially had developed severe bilateral LE weakness and was eventually diagnosed with GBS at [place] where he underwent treatment and improved.  He was doing well at Rehab and was well supplied with HIV medications as well at which time he was compliant with meds.  In last two months, he has not had regular access to HIV meds and thus has not been taking his Bictarvy. He recently presented to [facility] for similar worsening paraparesis at which time MRI C/T/L spine and LP were done which were unrevealing (2021).      On ROS, patient endorses bifrontal headache, denies visual changes, blurry vision, fever, chest pain,  saddle anesthesia or numbness otherwise, fecal incontinence.     In the ED, rectal Tmax 98.4, HR 75-92, -142/57-80, RR 16-17, 100% on room air. Patient given 1x tylenol and 1xativan. Neurology consulted, MR lumbar spine benign.  (2021 10:52)     prior hospital charts reviewed [  ]  primary team notes reviewed [  ]  other consultant notes reviewed [  ]    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    Asthma    CVA (cerebral vascular accident)    Closed fracture of multiple ribs of right side, initial encounter    Cocaine abuse    Chronic sinusitis    Homeless    GBS (Guillain [place] syndrome)    History of orthopedic surgery  left arm      Allergies  Ceclor (Unknown)  Toradol (Anaphylaxis; Swelling)  Toradol (Swelling)    ANTIMICROBIALS (past 90 days)  MEDICATIONS  (STANDING):  bictegravir 50 mG/emtricitabine 200 mG/tenofovir alafenamide 25 mG (BIKTARVY)   1 Tablet(s) Oral (21 @ 14:37)      ANTIMICROBIALS:    bictegravir 50 mG/emtricitabine 200 mG/tenofovir alafenamide 25 mG (BIKTARVY) 1 daily    OTHER MEDS: MEDICATIONS  (STANDING):  baclofen 10 two times a day  enoxaparin Injectable 40 daily  gabapentin 300 three times a day  QUEtiapine 200 at bedtime    SOCIAL HISTORY:   On prior presentation, patient endorses tobacco, marijuana, heroin, cocaine use.  Denies IVDA. Denies alcohol use.  This admission, denies tobacco, endorses social ETOH use and social marijuana use but denies other drugs of abuse including IV.    Patient now lives in [place], lives alone. Works cleaning highways. (2021 10:52)      FAMILY HISTORY:  FH: HIV infection (Mother)      REVIEW OF SYSTEMS  [  ] ROS unobtainable because:    [  ] All other systems negative except as noted below:	    Constitutional:  [ ] fever [ ] chills  [ ] weight loss  [ ] weakness  Skin:  [ ] rash [ ] phlebitis	  Eyes: [ ] icterus [ ] pain  [ ] discharge	  ENMT: [ ] sore throat  [ ] thrush [ ] ulcers [ ] exudates  Respiratory: [ ] dyspnea [ ] hemoptysis [ ] cough [ ] sputum	  Cardiovascular:  [ ] chest pain [ ] palpitations [ ] edema	  Gastrointestinal:  [ ] nausea [ ] vomiting [ ] diarrhea [ ] constipation [ ] pain	  Genitourinary:  [ ] dysuria [ ] frequency [ ] hematuria [ ] discharge [ ] flank pain  [ ] incontinence  Musculoskeletal:  [ ] myalgias [ ] arthralgias [ ] arthritis  [ ] back pain  Neurological:  [ ] headache [ ] seizures  [ ] confusion/altered mental status  Psychiatric:  [ ] anxiety [ ] depression	  Hematology/Lymphatics:  [ ] lymphadenopathy  Endocrine:  [ ] adrenal [ ] thyroid  Allergic/Immunologic:	 [ ] transplant [ ] seasonal    Vital Signs Last 24 Hrs  T(F): 97.5 (21 @ 05:28), Max: 98.5 (21 @ 23:29)  Vital Signs Last 24 Hrs  HR: 55 (21 @ 05:28) (55 - 64)  BP: 115/65 (21 @ 05:28) (115/65 - 119/66)  RR: 17 (21 @ 05:28)  SpO2: 100% (21 @ 05:28) (99% - 100%)  Wt(kg): --    EXAM:  Constitutional: Not in acute distress  Eyes: pupils bilaterally reactive to light. No icterus.  Oral cavity: Clear, no lesions  Neck: No neck vein distension noted  RS: Chest clear to auscultation bilaterally. No wheeze/rhonchi/crepitations.  CVS: S1, S2 heard. Regular rate and rhythm. No murmurs/rubs/gallops.  Abdomen: Soft. No guarding/rigidity/tenderness.  : No acute abnormalities  Extremities: Warm. No pedal edema  Skin: No lesions noted  Vascular: No evidence of phlebitis  Neuro: Alert, oriented to time/place/person                          12.1   3.39  )-----------( 266      ( 2021 07:26 )             37.7     06-    138  |  104  |  10  ----------------------------<  93  3.3<L>   |  21<L>  |  1.03    Ca    8.9      2021 07:26  Phos  4.0     06-  Mg     1.4     -    TPro  7.2  /  Alb  3.8  /  TBili  0.3  /  DBili  x   /  AST  16  /  ALT  7   /  AlkPhos  79  06-06    Urinalysis Basic - ( 2021 08:43 )    Color: Yellow / Appearance: Clear / S.035 / pH: x  Gluc: x / Ketone: Trace  / Bili: Negative / Urobili: <2 mg/dL   Blood: x / Protein: 30 mg/dL / Nitrite: Negative   Leuk Esterase: Negative / RBC: <5 /HPF / WBC <5 /HPF   Sq Epi: x / Non Sq Epi: Occasional / Bacteria: Occasional    MICROBIOLOGY:  HIV-1 RNA Quantitative, Viral Load Lo.58 (21 @ 06:03)  HIV-1 RNA Quantitative, Viral Load: 37,671 (21 @ 06:03)    Full T Cell Subset (10.26.20 @ 17:29)    LF0987 %: 4 %    CD19 %: 7 %    CD3 %: 89 %    CD4 %: 15 %    ABS CD8: 1548 /uL    ABS CD3: 1874 /uL    ABS CD4: 318 /uL    CD8 %: 71 %    4/8 Ratio: 0.21 RATIO    ABS GA1629: 75 /uL    ABS CD19: 137 /uL      HIV-1 RNA Quantitative, Viral Load Lo.88 (10-25-20 @ 21:28)    RADIOLOGY:  imaging below personally reviewed    < from: MR Lumbar Spine w/ IV Cont (21 @ 05:01) >  IMPRESSION: Motion degraded study.    No evidence of large epidural abscess or central canal stenosis.    < end of copied text >  < from: MR Cervical Spine w/wo IV Cont (21 @ 03:14) >  COMPARISON EXAMINATION: Spine MR 10/26/2020    FINDINGS:  The exam is degraded by motion artifact    CERVICAL SPINE:  VERTEBRAL BODIES:  Normal.  ALIGNMENT:  No subluxations.  INTERVERTEBRAL DISC SPACES:  Mild C6-C7 disc bulge  NEURAL FORAMINA:  Normal.  SPINAL CORD: Normal.  SPINAL CANAL:  No other intradural or extradural defects are seen. No abnormal enhancement is seen.  MISCELLANEOUS:  Prominent nasopharyngeal soft tissue is seen without significant change from the prior exam likely representing residual adenoids    THORACIC SPINE:  VERTEBRAL BODIES:  Normal.  ALIGNMENT:  No subluxations.  INTERVERTEBRAL DISC SPACES:  Normal.  NEURAL FORAMINA:  Normal.  SPINAL CORD: Normal.  SPINAL CANAL:  No other intradural or extradural defects are seen. No abnormal enhancement is seen.  MISCELLANEOUS:  None.    LUMBAR SPINE:  VERTEBRAL BODIES:  Normal.  ALIGNMENT:  No subluxations.  INTERVERTEBRAL DISC SPACES:  L5-S1 left lateral disc protrusion abutting the S1 nerve root, unchanged.  NEURAL FORAMINA:  Normal.  CONUS MEDULLARIS: Normal.  SPINAL CANAL:  No other intradural or extradural defects areseen. No abnormal enhancement is seen.  MISCELLANEOUS:  None.    IMPRESSION:  Motion degraded exam.    Mild spondylitic changes.    No spinal cord or intraspinal abnormality identified.    Prominent nasopharyngeal soft tissue likely representing adenoidal hypertrophy. Correlation with direct visualization is advised.     Patient is a 31y old  Male who presents with a chief complaint of Bilateral LE weakness (2021 07:27)    HPI:  31 year old man,  with congential HIV, GBS diagnosed one year ago responded to treatment and PT (now with residual LE weakness, requires cane intermittentl), who p/w acute worsening B/L LE weakness x 2 days causing inability to walk a/w fever (102 fever yesterday) as well as new urinary urge and difficulty emptying bladder.  Patient reports last year had initially had developed severe bilateral LE weakness and was eventually diagnosed with GBS at [place] where he underwent treatment and improved.  He was doing well at Rehab and was well supplied with HIV medications as well at which time he was compliant with meds.  In last two months, he has not had regular access to HIV meds and thus has not been taking his Bictarvy. He recently presented to [facility] for similar worsening paraparesis at which time MRI C/T/L spine and LP were done which were unrevealing (2021).      On ROS, patient endorses bifrontal headache, denies visual changes, blurry vision, fever, chest pain,  saddle anesthesia or numbness otherwise, fecal incontinence.     In the ED, rectal Tmax 98.4, HR 75-92, -142/57-80, RR 16-17, 100% on room air. Patient given 1x tylenol and 1xativan. Neurology consulted, MR lumbar spine benign.       ID consulted for poorly controlled HIV. Pt reports not returning to his outpt ID after the GBS occurrence suspected 2/2 to flu shot.   HAs only been receiving ART during hospitalizations.  Denies any sexual activity.     prior hospital charts reviewed [ x ]  primary team notes reviewed [ x ]  other consultant notes reviewed [  ]    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    Asthma    CVA (cerebral vascular accident)    Closed fracture of multiple ribs of right side, initial encounter    Cocaine abuse    Chronic sinusitis    Homeless    GBS (Guillain [place] syndrome)    History of orthopedic surgery  left arm      Allergies  Ceclor (Unknown)  Toradol (Anaphylaxis; Swelling)  Toradol (Swelling)    ANTIMICROBIALS (past 90 days)  MEDICATIONS  (STANDING):  bictegravir 50 mG/emtricitabine 200 mG/tenofovir alafenamide 25 mG (BIKTARVY)   1 Tablet(s) Oral (21 @ 14:37)      ANTIMICROBIALS:    bictegravir 50 mG/emtricitabine 200 mG/tenofovir alafenamide 25 mG (BIKTARVY) 1 daily    OTHER MEDS: MEDICATIONS  (STANDING):  baclofen 10 two times a day  enoxaparin Injectable 40 daily  gabapentin 300 three times a day  QUEtiapine 200 at bedtime    SOCIAL HISTORY:   On prior presentation, patient endorses tobacco, marijuana, heroin, cocaine use.  Denies IVDA. Denies alcohol use.  This admission, denies tobacco, endorses social ETOH use and social marijuana use but denies other drugs of abuse including IV.    Patient now lives in [place], lives alone. Works cleaning highways.       FAMILY HISTORY:  FH: HIV infection (Mother)      REVIEW OF SYSTEMS  [  ] ROS unobtainable because:    [ x ] All other systems negative except as noted below:	    Constitutional:  [ ] fever [ ] chills  [ ] weight loss  [ x] weakness  Skin:  [ ] rash [ ] phlebitis	  Eyes: [ ] icterus [ ] pain  [ ] discharge	  ENMT: [ ] sore throat  [ ] thrush [ ] ulcers [ ] exudates  Respiratory: [ ] dyspnea [ ] hemoptysis [ ] cough [ ] sputum	  Cardiovascular:  [ ] chest pain [ ] palpitations [ ] edema	  Gastrointestinal:  [ ] nausea [ ] vomiting [ ] diarrhea [ ] constipation [ ] pain	  Genitourinary:  [ ] dysuria [ ] frequency [ ] hematuria [ ] discharge [ ] flank pain  [ ] incontinence  Musculoskeletal:  [ ] myalgias [ ] arthralgias [ ] arthritis  [ x] back pain  Neurological:  [ ] headache [ ] seizures  [ ] confusion/altered mental status  Psychiatric:  [ ] anxiety [ ] depression	  Hematology/Lymphatics:  [ ] lymphadenopathy  Endocrine:  [ ] adrenal [ ] thyroid  Allergic/Immunologic:	 [ ] transplant [ ] seasonal    Vital Signs Last 24 Hrs  T(F): 97.5 (21 @ 05:28), Max: 98.5 (21 @ 23:29)  Vital Signs Last 24 Hrs  HR: 55 (21 @ 05:28) (55 - 64)  BP: 115/65 (21 @ 05:28) (115/65 - 119/66)  RR: 17 (21 @ 05:28)  SpO2: 100% (21 @ 05:28) (99% - 100%)  Wt(kg): --    EXAM:  Constitutional: Not in acute distress  Eyes: pupils bilaterally reactive to light. No icterus.  Oral cavity: Clear, no lesions  Neck: No neck vein distension noted  RS: Chest clear to auscultation bilaterally. No wheeze/rhonchi/crepitations.  CVS: S1, S2 heard. Regular rate and rhythm. No murmurs/rubs/gallops.  Abdomen: Soft. No guarding/rigidity/tenderness.  : No acute abnormalities  Extremities: Warm. No pedal edema  Skin: No lesions noted  Vascular: No evidence of phlebitis  Neuro: Alert, oriented to time/place/person; decreased strength 4/5 in LEs                          12.1   3.39  )-----------( 266      ( 2021 07:26 )             37.7     06-    138  |  104  |  10  ----------------------------<  93  3.3<L>   |  21<L>  |  1.03    Ca    8.9      2021 07:26  Phos  4.0     06-  Mg     1.4     -    TPro  7.2  /  Alb  3.8  /  TBili  0.3  /  DBili  x   /  AST  16  /  ALT  7   /  AlkPhos  79  06-06    Urinalysis Basic - ( 2021 08:43 )    Color: Yellow / Appearance: Clear / S.035 / pH: x  Gluc: x / Ketone: Trace  / Bili: Negative / Urobili: <2 mg/dL   Blood: x / Protein: 30 mg/dL / Nitrite: Negative   Leuk Esterase: Negative / RBC: <5 /HPF / WBC <5 /HPF   Sq Epi: x / Non Sq Epi: Occasional / Bacteria: Occasional    MICROBIOLOGY:  HIV-1 RNA Quantitative, Viral Load Lo.58 (21 @ 06:03)  HIV-1 RNA Quantitative, Viral Load: 37,671 (21 @ 06:03)    Full T Cell Subset (10.26.20 @ 17:29)    VH9017 %: 4 %    CD19 %: 7 %    CD3 %: 89 %    CD4 %: 15 %    ABS CD8: 1548 /uL    ABS CD3: 1874 /uL    ABS CD4: 318 /uL    CD8 %: 71 %    4/8 Ratio: 0.21 RATIO    ABS CE8355: 75 /uL    ABS CD19: 137 /uL      HIV-1 RNA Quantitative, Viral Load Lo.88 (10-25-20 @ 21:28)    RADIOLOGY:  imaging below personally reviewed    < from: MR Lumbar Spine w/ IV Cont (21 @ 05:01) >  IMPRESSION: Motion degraded study.    No evidence of large epidural abscess or central canal stenosis.    < end of copied text >  < from: MR Cervical Spine w/wo IV Cont (21 @ 03:14) >  COMPARISON EXAMINATION: Spine MR 10/26/2020    FINDINGS:  The exam is degraded by motion artifact    CERVICAL SPINE:  VERTEBRAL BODIES:  Normal.  ALIGNMENT:  No subluxations.  INTERVERTEBRAL DISC SPACES:  Mild C6-C7 disc bulge  NEURAL FORAMINA:  Normal.  SPINAL CORD: Normal.  SPINAL CANAL:  No other intradural or extradural defects are seen. No abnormal enhancement is seen.  MISCELLANEOUS:  Prominent nasopharyngeal soft tissue is seen without significant change from the prior exam likely representing residual adenoids    THORACIC SPINE:  VERTEBRAL BODIES:  Normal.  ALIGNMENT:  No subluxations.  INTERVERTEBRAL DISC SPACES:  Normal.  NEURAL FORAMINA:  Normal.  SPINAL CORD: Normal.  SPINAL CANAL:  No other intradural or extradural defects are seen. No abnormal enhancement is seen.  MISCELLANEOUS:  None.    LUMBAR SPINE:  VERTEBRAL BODIES:  Normal.  ALIGNMENT:  No subluxations.  INTERVERTEBRAL DISC SPACES:  L5-S1 left lateral disc protrusion abutting the S1 nerve root, unchanged.  NEURAL FORAMINA:  Normal.  CONUS MEDULLARIS: Normal.  SPINAL CANAL:  No other intradural or extradural defects areseen. No abnormal enhancement is seen.  MISCELLANEOUS:  None.    IMPRESSION:  Motion degraded exam.    Mild spondylitic changes.    No spinal cord or intraspinal abnormality identified.    Prominent nasopharyngeal soft tissue likely representing adenoidal hypertrophy. Correlation with direct visualization is advised.

## 2021-06-06 NOTE — DISCHARGE NOTE PROVIDER - NSDCCPCAREPLAN_GEN_ALL_CORE_FT
PRINCIPAL DISCHARGE DIAGNOSIS  Diagnosis: Leg weakness, bilateral  Assessment and Plan of Treatment: You presented to the ED with LE weakness      SECONDARY DISCHARGE DIAGNOSES  Diagnosis: HIV disease  Assessment and Plan of Treatment:     Diagnosis: Tooth ache  Assessment and Plan of Treatment:      PRINCIPAL DISCHARGE DIAGNOSIS  Diagnosis: Leg weakness, bilateral  Assessment and Plan of Treatment: You presented to the ED with lower extremity weakness. Imaging and labs work up has been negative. Your lower extremity weakness has been improving. You will benefit from physical therapy and therefore are being sent to rehab      SECONDARY DISCHARGE DIAGNOSES  Diagnosis: HIV disease  Assessment and Plan of Treatment: Please make sure to follow up with Dr. Hunter for HIV follow up. please continue taking your HIV meds    Diagnosis: Tooth ache  Assessment and Plan of Treatment: You were complaining of right tooth pain, you were evaluated by dental and went to dental procedure/cleaning. Please follow up with dental for your dental care     PRINCIPAL DISCHARGE DIAGNOSIS  Diagnosis: Leg weakness, bilateral  Assessment and Plan of Treatment: You presented to the ED with lower extremity weakness. Imaging and labs work up has been negative. Your lower extremity weakness has been improving. You will benefit from physical therapy and therefore are being sent to rehab      SECONDARY DISCHARGE DIAGNOSES  Diagnosis: HIV disease  Assessment and Plan of Treatment: Please make sure to follow up with Dr. Hunter for HIV follow up. please continue taking your HIV meds    Diagnosis: Tooth ache  Assessment and Plan of Treatment: You were complaining of right tooth pain, you were evaluated by dental and went to dental procedure/cleaning/and tooth extraction. Please follow up with dental for your dental care. please continue antibiotics until 6/20

## 2021-06-07 DIAGNOSIS — K08.89 OTHER SPECIFIED DISORDERS OF TEETH AND SUPPORTING STRUCTURES: ICD-10-CM

## 2021-06-07 LAB
ALBUMIN SERPL ELPH-MCNC: 3.8 G/DL — SIGNIFICANT CHANGE UP (ref 3.3–5)
ALP SERPL-CCNC: 76 U/L — SIGNIFICANT CHANGE UP (ref 40–120)
ALT FLD-CCNC: 10 U/L — SIGNIFICANT CHANGE UP (ref 4–41)
ANION GAP SERPL CALC-SCNC: 15 MMOL/L — HIGH (ref 7–14)
AST SERPL-CCNC: 16 U/L — SIGNIFICANT CHANGE UP (ref 4–40)
BASOPHILS # BLD AUTO: 0.02 K/UL — SIGNIFICANT CHANGE UP (ref 0–0.2)
BASOPHILS NFR BLD AUTO: 0.5 % — SIGNIFICANT CHANGE UP (ref 0–2)
BILIRUB SERPL-MCNC: 0.2 MG/DL — SIGNIFICANT CHANGE UP (ref 0.2–1.2)
BUN SERPL-MCNC: 10 MG/DL — SIGNIFICANT CHANGE UP (ref 7–23)
C TRACH RRNA SPEC QL NAA+PROBE: SIGNIFICANT CHANGE UP
CALCIUM SERPL-MCNC: 9 MG/DL — SIGNIFICANT CHANGE UP (ref 8.4–10.5)
CHLORIDE SERPL-SCNC: 103 MMOL/L — SIGNIFICANT CHANGE UP (ref 98–107)
CO2 SERPL-SCNC: 21 MMOL/L — LOW (ref 22–31)
CREAT SERPL-MCNC: 0.93 MG/DL — SIGNIFICANT CHANGE UP (ref 0.5–1.3)
EOSINOPHIL # BLD AUTO: 0.19 K/UL — SIGNIFICANT CHANGE UP (ref 0–0.5)
EOSINOPHIL NFR BLD AUTO: 5.2 % — SIGNIFICANT CHANGE UP (ref 0–6)
GLUCOSE SERPL-MCNC: 100 MG/DL — HIGH (ref 70–99)
HCT VFR BLD CALC: 39.1 % — SIGNIFICANT CHANGE UP (ref 39–50)
HCV AB S/CO SERPL IA: 1.09 S/CO — HIGH (ref 0–0.99)
HCV AB SERPL-IMP: ABNORMAL
HGB BLD-MCNC: 12.2 G/DL — LOW (ref 13–17)
HIV-1 VIRAL LOAD RESULT: ABNORMAL
HIV1 RNA # SERPL NAA+PROBE: SIGNIFICANT CHANGE UP
HIV1 RNA SER-IMP: SIGNIFICANT CHANGE UP
HIV1 RNA SERPL NAA+PROBE-ACNC: ABNORMAL
HIV1 RNA SERPL NAA+PROBE-LOG#: 3.88 — SIGNIFICANT CHANGE UP
IANC: 1.05 K/UL — LOW (ref 1.5–8.5)
IMM GRANULOCYTES NFR BLD AUTO: 0.3 % — SIGNIFICANT CHANGE UP (ref 0–1.5)
LYMPHOCYTES # BLD AUTO: 2.01 K/UL — SIGNIFICANT CHANGE UP (ref 1–3.3)
LYMPHOCYTES # BLD AUTO: 55.1 % — HIGH (ref 13–44)
MAGNESIUM SERPL-MCNC: 1.5 MG/DL — LOW (ref 1.6–2.6)
MCHC RBC-ENTMCNC: 28.6 PG — SIGNIFICANT CHANGE UP (ref 27–34)
MCHC RBC-ENTMCNC: 31.2 GM/DL — LOW (ref 32–36)
MCV RBC AUTO: 91.6 FL — SIGNIFICANT CHANGE UP (ref 80–100)
MONOCYTES # BLD AUTO: 0.37 K/UL — SIGNIFICANT CHANGE UP (ref 0–0.9)
MONOCYTES NFR BLD AUTO: 10.1 % — SIGNIFICANT CHANGE UP (ref 2–14)
N GONORRHOEA RRNA SPEC QL NAA+PROBE: SIGNIFICANT CHANGE UP
NEUTROPHILS # BLD AUTO: 1.05 K/UL — LOW (ref 1.8–7.4)
NEUTROPHILS NFR BLD AUTO: 28.8 % — LOW (ref 43–77)
NRBC # BLD: 0 /100 WBCS — SIGNIFICANT CHANGE UP
NRBC # FLD: 0 K/UL — SIGNIFICANT CHANGE UP
PHOSPHATE SERPL-MCNC: 3.3 MG/DL — SIGNIFICANT CHANGE UP (ref 2.5–4.5)
PLATELET # BLD AUTO: 292 K/UL — SIGNIFICANT CHANGE UP (ref 150–400)
POTASSIUM SERPL-MCNC: 3.6 MMOL/L — SIGNIFICANT CHANGE UP (ref 3.5–5.3)
POTASSIUM SERPL-SCNC: 3.6 MMOL/L — SIGNIFICANT CHANGE UP (ref 3.5–5.3)
PROT SERPL-MCNC: 7.2 G/DL — SIGNIFICANT CHANGE UP (ref 6–8.3)
RBC # BLD: 4.27 M/UL — SIGNIFICANT CHANGE UP (ref 4.2–5.8)
RBC # FLD: 13.5 % — SIGNIFICANT CHANGE UP (ref 10.3–14.5)
SODIUM SERPL-SCNC: 139 MMOL/L — SIGNIFICANT CHANGE UP (ref 135–145)
SPECIMEN SOURCE: SIGNIFICANT CHANGE UP
VZV IGM SER-ACNC: <0.91 INDEX — SIGNIFICANT CHANGE UP (ref 0–0.9)
WBC # BLD: 3.65 K/UL — LOW (ref 3.8–10.5)
WBC # FLD AUTO: 3.65 K/UL — LOW (ref 3.8–10.5)

## 2021-06-07 PROCEDURE — 99232 SBSQ HOSP IP/OBS MODERATE 35: CPT

## 2021-06-07 PROCEDURE — 99233 SBSQ HOSP IP/OBS HIGH 50: CPT | Mod: GC

## 2021-06-07 PROCEDURE — 99232 SBSQ HOSP IP/OBS MODERATE 35: CPT | Mod: GC

## 2021-06-07 RX ORDER — MAGNESIUM SULFATE 500 MG/ML
4 VIAL (ML) INJECTION ONCE
Refills: 0 | Status: COMPLETED | OUTPATIENT
Start: 2021-06-07 | End: 2021-06-07

## 2021-06-07 RX ADMIN — Medication 100 GRAM(S): at 11:07

## 2021-06-07 RX ADMIN — GABAPENTIN 300 MILLIGRAM(S): 400 CAPSULE ORAL at 05:17

## 2021-06-07 RX ADMIN — Medication 10 MILLIGRAM(S): at 17:41

## 2021-06-07 RX ADMIN — GABAPENTIN 300 MILLIGRAM(S): 400 CAPSULE ORAL at 22:32

## 2021-06-07 RX ADMIN — Medication 10 MILLIGRAM(S): at 05:17

## 2021-06-07 RX ADMIN — GABAPENTIN 300 MILLIGRAM(S): 400 CAPSULE ORAL at 13:10

## 2021-06-07 RX ADMIN — QUETIAPINE FUMARATE 200 MILLIGRAM(S): 200 TABLET, FILM COATED ORAL at 22:32

## 2021-06-07 RX ADMIN — BICTEGRAVIR SODIUM, EMTRICITABINE, AND TENOFOVIR ALAFENAMIDE FUMARATE 1 TABLET(S): 30; 120; 15 TABLET ORAL at 11:06

## 2021-06-07 NOTE — PROGRESS NOTE ADULT - PROBLEM SELECTOR PLAN 3
-patient noncompliant on medications d/t lack of access (when asked, patient states he "does not know where to get them").   -f/u HIV viral load, CD4 count  -ID recs appreciated  - resumed Biktarvy per ID recs   - f/u blood cultures (patient not septic on presentation, reported fevers at home)- NGTD   - ID recs appreciated, pending hepatitis panel, gonorrhea/chlamydia

## 2021-06-07 NOTE — PROGRESS NOTE ADULT - ASSESSMENT
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    here has remained afebrile. Still with LE weakness.  MRI L-spine unremarkable. Now pending MRI C/T spine and head.  Uncontrolled HIV, undomiciled, does not follow with an HIV specialist at this time. Off ARTs for about two months. Receives his ARTs when discharged from hospital setting. Has no cell phone.  C/o right sided tooth pain.    Recommend:  #LE weakness  -F/U MRI C/T spine and MRI head  -Appreciate Neurology following  -Nontoxic appearing, leukopenic on lab, afebrile   -F/U blood cultures so far no growth    #HIV  -Continue Biktarvy  -On discharge, can follow up at [facility] for continued HIV care.  -HIV VL/ T cell count  -F/U Quant Tb gold  -Acute viral hepatitis panel    #Right sided dental pain  -Given inability to make doctor's appointments- would have dental see him here    Easton Hunter MD  Pager [phone]  After 5pm/weekends call [phone]

## 2021-06-07 NOTE — PROGRESS NOTE ADULT - SUBJECTIVE AND OBJECTIVE BOX
Neurology  Progress Note  06-07-21    Name:  NELSON MITCHELL; 31y    Interval History: Patient seen and examined at bedside. Patient reports frequent intermittent muscle spasms of his lower extremities L>R that radiates from his back. Reports no change in weakness.    Medications:  acetaminophen   Tablet .. 650 milliGRAM(s) Oral once  baclofen 10 milliGRAM(s) Oral two times a day  bictegravir 50 mG/emtricitabine 200 mG/tenofovir alafenamide 25 mG (BIKTARVY) 1 Tablet(s) Oral daily  enoxaparin Injectable 40 milliGRAM(s) SubCutaneous daily  gabapentin 300 milliGRAM(s) Oral three times a day  LORazepam   Injectable 2 milliGRAM(s) IV Push once  magnesium sulfate  IVPB 2 Gram(s) IV Intermittent once  magnesium sulfate  IVPB 4 Gram(s) IV Intermittent once  potassium chloride    Tablet ER 40 milliEquivalent(s) Oral once  QUEtiapine 200 milliGRAM(s) Oral at bedtime      Vitals:  T(C): 36.6 (06-07-21 @ 05:16), Max: 36.6 (06-06-21 @ 21:14)  HR: 63 (06-07-21 @ 05:16) (57 - 68)  BP: 110/71 (06-07-21 @ 05:16) (110/71 - 120/79)  RR: 16 (06-07-21 @ 05:16) (16 - 18)  SpO2: 100% (06-07-21 @ 05:16) (99% - 100%)    Physical Examination:  Neurologic:  - Mental Status: Alert, awake; Speech is fluent with intact comprehension; Grossly follows commands.  - Cranial Nerves:  II: Visual fields are full to confrontation; Pupils are equal, round, and reactive to light;  III, IV, VI: Extraocular movements are intact without nystagmus.  V: Facial sensation is intact in the V1-V3 distribution bilaterally.  VII: Face is symmetric with normal eye closure and smile.  VIII: Hearing is intact to finger rub.  IX, X: Uvula is midline and soft palate rises symmetrically.  XI: Head turning and shoulder shrug are intact.  XII: Tongue protrudes in the midline.  - Motor: Strength is 5/5 in biceps/triceps/deltoids b/l. Hip flexion, knee extension, dorsi/plantar flexion 3/5 b/l, however very effort-limited and patient noted to withdraw antigravity with noxious stimuli. Normal muscle bulk and tone throughout.  - Reflexes: Minimally 3+ and symmetric at the knees, 2+ at the ankles. Plant responses equivocal due to withdrawal reaction.  - Sensory: Intact throughout to light touch  - Gait: Deferred    Labs:                        12.2   3.65  )-----------( 292      ( 07 Jun 2021 07:24 )             39.1     06-07    139  |  103  |  10  ----------------------------<  100<H>  3.6   |  21<L>  |  0.93    Ca    9.0      07 Jun 2021 07:24  Phos  3.3     06-07  Mg     1.5     06-07    TPro  7.2  /  Alb  3.8  /  TBili  0.2  /  DBili  x   /  AST  16  /  ALT  10  /  AlkPhos  76  06-07        LIVER FUNCTIONS - ( 07 Jun 2021 07:24 )  Alb: 3.8 g/dL / Pro: 7.2 g/dL / ALK PHOS: 76 U/L / ALT: 10 U/L / AST: 16 U/L / GGT: x           Culture - Blood (collected 05 Jun 2021 16:19)  Source: .Blood Blood-Venous  Preliminary Report (06 Jun 2021 17:01):    No growth to date.    Culture - Blood (collected 05 Jun 2021 16:19)  Source: .Blood Blood-Peripheral  Preliminary Report (06 Jun 2021 17:01):    No growth to date.        Radiology:  MR Lumbar Spine w/ IV Cont (06.05.21 @ 05:01)  IMPRESSION: Motion degraded study.  No evidence of large epidural abscess or central canal stenosis.     Neurology  Progress Note  06-07-21    Name:  NELSON MITCHELL; 31y    Interval History: Patient seen and examined at bedside. Patient reports frequent intermittent muscle spasms of his lower extremities L>R that radiates from his back. Reports no change in weakness.    Medications:  acetaminophen   Tablet .. 650 milliGRAM(s) Oral once  baclofen 10 milliGRAM(s) Oral two times a day  bictegravir 50 mG/emtricitabine 200 mG/tenofovir alafenamide 25 mG (BIKTARVY) 1 Tablet(s) Oral daily  enoxaparin Injectable 40 milliGRAM(s) SubCutaneous daily  gabapentin 300 milliGRAM(s) Oral three times a day  LORazepam   Injectable 2 milliGRAM(s) IV Push once  magnesium sulfate  IVPB 2 Gram(s) IV Intermittent once  magnesium sulfate  IVPB 4 Gram(s) IV Intermittent once  potassium chloride    Tablet ER 40 milliEquivalent(s) Oral once  QUEtiapine 200 milliGRAM(s) Oral at bedtime      Vitals:  T(C): 36.6 (06-07-21 @ 05:16), Max: 36.6 (06-06-21 @ 21:14)  HR: 63 (06-07-21 @ 05:16) (57 - 68)  BP: 110/71 (06-07-21 @ 05:16) (110/71 - 120/79)  RR: 16 (06-07-21 @ 05:16) (16 - 18)  SpO2: 100% (06-07-21 @ 05:16) (99% - 100%)    Physical Examination:  Gen: Mild distress 2/2 pain  Ext: no edema , muscle spasm    Neurologic:  - Mental Status: Alert, awake; Speech is fluent with intact comprehension; Grossly follows commands.  - Cranial Nerves:  II: Visual fields are full to confrontation; Pupils are equal, round, and reactive to light;  III, IV, VI: Extraocular movements are intact without nystagmus.  V: Facial sensation is intact in the V1-V3 distribution bilaterally.  VII: Face is symmetric with normal eye closure and smile.  VIII: Hearing is intact to finger rub.  IX, X: Uvula is midline and soft palate rises symmetrically.  XI: Head turning and shoulder shrug are intact.  XII: Tongue protrudes in the midline.  - Motor: Strength is 5/5 in biceps/triceps/deltoids b/l. Hip flexion, knee extension, dorsi/plantar flexion 3/5 b/l, however very effort-limited and patient noted to withdraw antigravity with noxious stimuli. Normal muscle bulk and tone throughout.  - Reflexes: Minimally 3+ and symmetric at the knees, 2+ at the ankles. Plantar responses equivocal due to withdrawal reaction.  - Sensory: Intact throughout to light touch  - Gait: Deferred    Labs:                        12.2   3.65  )-----------( 292      ( 07 Jun 2021 07:24 )             39.1     06-07    139  |  103  |  10  ----------------------------<  100<H>  3.6   |  21<L>  |  0.93    Ca    9.0      07 Jun 2021 07:24  Phos  3.3     06-07  Mg     1.5     06-07    TPro  7.2  /  Alb  3.8  /  TBili  0.2  /  DBili  x   /  AST  16  /  ALT  10  /  AlkPhos  76  06-07        LIVER FUNCTIONS - ( 07 Jun 2021 07:24 )  Alb: 3.8 g/dL / Pro: 7.2 g/dL / ALK PHOS: 76 U/L / ALT: 10 U/L / AST: 16 U/L / GGT: x           Culture - Blood (collected 05 Jun 2021 16:19)  Source: .Blood Blood-Venous  Preliminary Report (06 Jun 2021 17:01):    No growth to date.    Culture - Blood (collected 05 Jun 2021 16:19)  Source: .Blood Blood-Peripheral  Preliminary Report (06 Jun 2021 17:01):    No growth to date.        Radiology:  MR Lumbar Spine w/ IV Cont (06.05.21 @ 05:01)  IMPRESSION: Motion degraded study.  No evidence of large epidural abscess or central canal stenosis.

## 2021-06-07 NOTE — PROGRESS NOTE ADULT - PROBLEM SELECTOR PLAN 5
DVT prophylaxis: lovenox 40mg subQ  Diet: regular DVT prophylaxis: lovenox 40mg subQ  Diet: regular  Dispo: social work consult given homelessness, PT recommends rehab patient reports diagnosis of afib approximately 1 year ago, reported stroke but no residual deficits, no documentation from prior hospital visits of afib diagnosis, stroke worked up but was negative  -continue to monitor for now, hold off AC

## 2021-06-07 NOTE — PROGRESS NOTE ADULT - SUBJECTIVE AND OBJECTIVE BOX
CC: Patient is a 31y old  Male who presents with a chief complaint of Bilateral LE weakness (2021 11:00)    ID following for bilateral LE weakness, HIV care    Interval History/ROS: Patient states still remains with bilateral LE weakness. No fevers, no chills. Pending MRI spine and head.    Rest of ROS negative.    Allergies  Ceclor (Unknown)  Toradol (Anaphylaxis; Swelling)  Toradol (Swelling)    ANTIMICROBIALS:  bictegravir 50 mG/emtricitabine 200 mG/tenofovir alafenamide 25 mG (BIKTARVY) 1 daily    OTHER MEDS:  baclofen 10 milliGRAM(s) Oral two times a day  enoxaparin Injectable 40 milliGRAM(s) SubCutaneous daily  gabapentin 300 milliGRAM(s) Oral three times a day  QUEtiapine 200 milliGRAM(s) Oral at bedtime    PE:    Vital Signs Last 24 Hrs  T(C): 36.7 (2021 12:20), Max: 36.7 (2021 12:20)  T(F): 98.1 (2021 12:20), Max: 98.1 (2021 12:20)  HR: 74 (2021 12:20) (63 - 74)  BP: 119/68 (2021 12:20) (110/71 - 120/79)  BP(mean): --  RR: 18 (2021 12:20) (16 - 18)  SpO2: 98% (2021 12:20) (98% - 100%)    Gen: AOx3, NAD  CV: S1+S2 normal, no murmurs  Resp: Clear bilat, no resp distress  Abd: Soft, nontender, +BS  Ext: No LE edema, no wounds  : No Brizuela  IV/Skin: No thrombophlebitis  Neuro: no focal deficits    LABS:                          12.2   3.65  )-----------( 292      ( 2021 07:24 )             39.1       06-07    139  |  103  |  10  ----------------------------<  100<H>  3.6   |  21<L>  |  0.93    Ca    9.0      2021 07:24  Phos  3.3     06-07  Mg     1.5     06-07    TPro  7.2  /  Alb  3.8  /  TBili  0.2  /  DBili  x   /  AST  16  /  ALT  10  /  AlkPhos  76            MICROBIOLOGY:  v  .Blood Blood-Peripheral  21   No growth to date.  --  --      .Blood Blood  21   No growth at 5 days.  --  --      .Blood Blood  21   No growth at 5 days.  --  --    HIV-1 RNA Quantitative, Viral Load Lo.58 (21 @ 06:03)  HIV-1 RNA Quantitative, Viral Load Lo.88 (10-25-20 @ 21:28)    RADIOLOGY:    < from: MR Lumbar Spine w/ IV Cont (21 @ 05:01) >  IMPRESSION: Motion degraded study.    No evidence of large epidural abscess or central canal stenosis.    < end of copied text >

## 2021-06-07 NOTE — PROGRESS NOTE ADULT - ASSESSMENT
31 year old man,  with congential HIV, GBS, AFib not on AC and reported stroke who p/w acute worsening B/L LE weakness x 2 days a/w fever (102 fever yesterday) as well as new change in urinary, c/f GBS flare vs osteomyelitis vs epidural abscess. Thus far work up has been negative

## 2021-06-07 NOTE — PROGRESS NOTE ADULT - PROBLEM SELECTOR PLAN 7
Social work consult, history of homelessness, patient not homeless right now. Lack of access to medications.   -UTox positive for cocaine and THC, c/w seroquel 200 mg bedtime    Transitions of Care Status:  1.  Name of PCP:  2.  PCP Contacted on Admission: [ ] Y    [ ] N    3.  PCP contacted at Discharge: [ ] Y    [ ] N    [ ] N/A  4.  Post-Discharge Appointment Date and Location:  5.  Summary of Handoff given to PCP: Social work consult, history of homelessness, patient not homeless right now. Lack of access to medications.   -UTox positive for cocaine and THC, c/w seroquel 200 mg bedtime

## 2021-06-07 NOTE — PROGRESS NOTE ADULT - ATTENDING COMMENTS
Pt seen and examined, chart and labs reviewed.  Agree with Dr. Lau's note as above.  Awaiting MR C/T spine and further infectious workup.

## 2021-06-07 NOTE — PROGRESS NOTE ADULT - ATTENDING COMMENTS
31y R-HM w h/o HIV, GBS, stroke, Afib (not on AC)and former cocaine abuse p/w acute worsening paraparesis, fever (Tmax 102.7), urinary and bowel urgency and increased frequency w/ incomplete bladder emptying x 2 days. Pt complaining of back spasms and still some difficulty draining all of his urine. UA appears positive for an infection which may explain retention. His strength exam is inconsistent, Muse maneuver was equivocal, and he makes strange movements when he has a spasm although no spasm is noted. There may be some functional or malingering component but difficult to ascertain. Will continue with MRI to rule out any myelitis or compression although doubtful. Similar workup on last admission was negative. Would avoid opiate pain medications. We can try a muscle relaxant instead to manage muscle spasms. 31y R-HM w h/o HIV, GBS, stroke, Afib (not on AC)and former cocaine abuse p/w acute worsening paraparesis, fever (Tmax 102.7), urinary and bowel urgency and increased frequency w/ incomplete bladder emptying x 2 days. Pt complaining of back spasms and still some difficulty draining all of his urine. UA appears positive for an infection which may explain retention. His strength exam is inconsistent, Muse maneuver was equivocal, and he makes strange movements when he has a spasm although no spasm is noted. There may be some functional or malingering component but difficult to ascertain. Will continue with MRI to rule out any myelitis or compression although doubtful. Similar workup on last admission was negative. Would avoid opiate pain medications. We can try a muscle relaxant instead to manage muscle spasms. Hyperreflexia on exam would not be consistent with Guillian barre.

## 2021-06-07 NOTE — PROGRESS NOTE ADULT - PROBLEM SELECTOR PLAN 1
-neurology onboard, appreciate recommendations  -MRI L/S spine w/wo contrast limited by motion artifact, benign (negative for OM, abscess, or spinal stenosis)  -f/u MR brain, cervical and thoracic spine, repeat lumbar spine 2/2 initial imaging limitations- pending   -f/u screen for HSV,1/2, VZV (+former infection), CMV (negative), WNV and syphilis (negative), lyme (negative), B12 (wnl), folate (wnl).     -PT consult- recs rehab -neurology onboard, appreciate recommendations  -MRI L/S spine w/wo contrast limited by motion artifact, benign (negative for OM, abscess, or spinal stenosis)  -f/u MR brain, cervical and thoracic spine  -f/u screen for HSV,1/2, VZV (+former infection), CMV (negative), WNV and syphilis (negative), lyme (negative), B12 (wnl), folate (wnl).     -PT consult- recs rehab

## 2021-06-07 NOTE — PROGRESS NOTE ADULT - SUBJECTIVE AND OBJECTIVE BOX
Laura Lau MD  PGY 1 Department of Internal Medicine  Pager: [phone] ([facility])       Patient is a 31y old  Male who presents with a chief complaint of Bilateral LE weakness (2021 10:45)      SUBJECTIVE / OVERNIGHT EVENTS: Pt seen and examined. No acute overnight events.   Denies fevers, chills, CP, SOB, Abdominal pain, N/V, Constipation, Diarrhea        MEDICATIONS  (STANDING):  baclofen 10 milliGRAM(s) Oral two times a day  bictegravir 50 mG/emtricitabine 200 mG/tenofovir alafenamide 25 mG (BIKTARVY) 1 Tablet(s) Oral daily  enoxaparin Injectable 40 milliGRAM(s) SubCutaneous daily  gabapentin 300 milliGRAM(s) Oral three times a day  QUEtiapine 200 milliGRAM(s) Oral at bedtime    MEDICATIONS  (PRN):      I&O's Summary    2021 07:01  -  2021 07:00  --------------------------------------------------------  IN: 0 mL / OUT: 200 mL / NET: -200 mL        Vital Signs Last 24 Hrs  T(C): 36.6 (2021 05:16), Max: 36.6 (2021 21:14)  T(F): 97.9 (2021 05:16), Max: 97.9 (2021 21:14)  HR: 63 (2021 05:16) (57 - 68)  BP: 110/71 (2021 05:16) (110/71 - 120/79)  BP(mean): --  RR: 16 (2021 05:16) (16 - 18)  SpO2: 100% (2021 05:16) (99% - 100%)    CAPILLARY BLOOD GLUCOSE      PHYSICAL EXAM:         LABS:                        12.1   3.39  )-----------( 266      ( 2021 07:26 )             37.7     Auto Eosinophil # 0.13  / Auto Eosinophil % 3.8   / Auto Neutrophil # 1.10  / Auto Neutrophil % 32.5  / BANDS % x        -    138  |  104  |  10  ----------------------------<  93  3.3<L>   |  21<L>  |  1.03    Ca    8.9      2021 07:26  Mg     1.4     -  Phos  4.0     -  TPro  7.2  /  Alb  3.8  /  TBili  0.3  /  DBili  x   /  AST  16  /  ALT  7   /  AlkPhos  79  -          Urinalysis Basic - ( 2021 08:43 )    Color: Yellow / Appearance: Clear / S.035 / pH: x  Gluc: x / Ketone: Trace  / Bili: Negative / Urobili: <2 mg/dL   Blood: x / Protein: 30 mg/dL / Nitrite: Negative   Leuk Esterase: Negative / RBC: <5 /HPF / WBC <5 /HPF   Sq Epi: x / Non Sq Epi: Occasional / Bacteria: Occasional         Laura Lau MD  PGY 1 Department of Internal Medicine  Pager: [phone] ([facility])       Patient is a 31y old  Male who presents with a chief complaint of Bilateral LE weakness (2021 10:45)      SUBJECTIVE / OVERNIGHT EVENTS: Pt seen and examined. No acute overnight events.   Denies fevers, chills, CP, SOB, Abdominal pain, N/V, Constipation, Diarrhea. Endorses healthy appetite and normal BM  Endorses R tooth pain likely 2/2 cavity and persistent LE weakness      MEDICATIONS  (STANDING):  baclofen 10 milliGRAM(s) Oral two times a day  bictegravir 50 mG/emtricitabine 200 mG/tenofovir alafenamide 25 mG (BIKTARVY) 1 Tablet(s) Oral daily  enoxaparin Injectable 40 milliGRAM(s) SubCutaneous daily  gabapentin 300 milliGRAM(s) Oral three times a day  QUEtiapine 200 milliGRAM(s) Oral at bedtime    MEDICATIONS  (PRN):      I&O's Summary    2021 07:01  -  2021 07:00  --------------------------------------------------------  IN: 0 mL / OUT: 200 mL / NET: -200 mL        Vital Signs Last 24 Hrs  T(C): 36.6 (2021 05:16), Max: 36.6 (2021 21:14)  T(F): 97.9 (2021 05:16), Max: 97.9 (2021 21:14)  HR: 63 (2021 05:16) (57 - 68)  BP: 110/71 (2021 05:16) (110/71 - 120/79)  BP(mean): --  RR: 16 (2021 05:16) (16 - 18)  SpO2: 100% (2021 05:16) (99% - 100%)    CAPILLARY BLOOD GLUCOSE      PHYSICAL EXAM:  GENERAL: NAD, lying in bed comfortably  HEAD:  Atraumatic, Normocephalic  EYES: constricted pupils b/l (Utox positive for cocaine)   ENT: Moist mucous membranes,   Mouth: second molar right sided tooth appears to have cavitary lesion    CHEST/LUNG: Clear to auscultation bilaterally; No rales, rhonchi, wheezing, or rubs. Unlabored respirations, on room air   HEART: Regular rate and rhythm; No murmurs, rubs, or gallops  ABDOMEN: Bowel sounds present; Soft, Nontender, Nondistended.   EXTREMITIES:  2+ Peripheral Pulses intact b/l, no LE edema   NERVOUS SYSTEM:  Alert & Oriented X3, speech clear. CN I-XII intact. LE 1/5 strength b/l. Sensory intact in all extremities. UE 5/5 strength b/l        LABS:                        12.1   3.39  )-----------( 266      ( 2021 07:26 )             37.7     Auto Eosinophil # 0.13  / Auto Eosinophil % 3.8   / Auto Neutrophil # 1.10  / Auto Neutrophil % 32.5  / BANDS % x        06-06    138  |  104  |  10  ----------------------------<  93  3.3<L>   |  21<L>  |  1.03    Ca    8.9      2021 07:26  Mg     1.4     06-06  Phos  4.0     06-06  TPro  7.2  /  Alb  3.8  /  TBili  0.3  /  DBili  x   /  AST  16  /  ALT  7   /  AlkPhos  79  06-06          Urinalysis Basic - ( 2021 08:43 )    Color: Yellow / Appearance: Clear / S.035 / pH: x  Gluc: x / Ketone: Trace  / Bili: Negative / Urobili: <2 mg/dL   Blood: x / Protein: 30 mg/dL / Nitrite: Negative   Leuk Esterase: Negative / RBC: <5 /HPF / WBC <5 /HPF   Sq Epi: x / Non Sq Epi: Occasional / Bacteria: Occasional

## 2021-06-07 NOTE — PROGRESS NOTE ADULT - ASSESSMENT
31y R-HM w h/o HIV, GBS, stroke, Afib (not on AC)and former cocaine abuse p/w acute worsening paraparesis, fever (Tmax 102.7), urinary and bowel urgency and increased frequency w/ incomplete bladder emptying x 2 days.  PT recently had similar presentation at [facility], discharged and now returning in one week later.  Of note, PT reports being off Bictarvi x 2 months and CD4 count 318 as of 10/2020.  On exam, he had 2/5 strength B/L hip flexion, 3/5 B/L hip abduction and adduction, 2/5 knee extension., 3/5 dorsiflexion and plantarflexion, B/L 3+ reflexes w/ crossed adductors +, left upgoing toe and point tenderness to palpation in low mid lumbar spine.  Though recent MRI C/T/L spine in 05/2021 did not show acute pathology, PT reported new symptoms urinary and bowel symptoms as well as new fevers. Today, patient reported muscle spasms which were not noted during examination and states he has full sensation during urination with complete emptying of the bladder. Additionally patient's exam was effort-limited with no significant overall change. MR l-spine shows no abscess or acute pathology, however study was motion-limited.    IMPRESSION: Acute paraparesis a/w hyperreflexia, and point tenderness in lumbar spine in immunocompromised patient concerning for infectious process such as osteomyelitis or epidural abscess requiring further imaging.    Plan  [] Repeat MR lumbar spine w/w/o contrast  [] MR c/t spine w/w/o contrast  [] Can start baclofen 5mg TID for muscle spasms  [] Full T cell subset (last done 04/2021) and HIV viral load (now detected, CD4-380 10/2020 but now off Rx)  [] Monitor CBC w/ diff, CMP  [] QuantiFeron gold to screen for TB and associated Pott's disease.  [] Screen for HSV,1/2, WNV and syphilis  [] PT/OT  [] DVT ppx    Case seen and discussed with neurology attending Dr. Zepeda. 31y R-HM w h/o HIV, GBS, stroke, Afib (not on AC)and former cocaine abuse p/w acute worsening paraparesis, fever (Tmax 102.7), urinary and bowel urgency and increased frequency w/ incomplete bladder emptying x 2 days.  PT recently had similar presentation at [facility], discharged and now returning in one week later.  Of note, PT reports being off Bictarvi x 2 months and CD4 count 318 as of 10/2020.  On exam, he had 2/5 strength B/L hip flexion, 3/5 B/L hip abduction and adduction, 2/5 knee extension., 3/5 dorsiflexion and plantarflexion, B/L 3+ reflexes w/ crossed adductors +, left upgoing toe and point tenderness to palpation in low mid lumbar spine.  Though recent MRI C/T/L spine in 05/2021 did not show acute pathology, PT reported new symptoms urinary and bowel symptoms as well as new fevers. Today, patient reported muscle spasms which were not noted during examination and states he has full sensation during urination with complete emptying of the bladder. Additionally patient's exam was effort-limited with no significant overall change. MR l-spine shows no abscess or acute pathology, however study was motion-limited.    IMPRESSION: Acute paraparesis a/w hyperreflexia, and point tenderness in lumbar spine in immunocompromised patient concerning for infectious process such as myelitis  or epidural abscess requiring further imaging.    Plan  [] Repeat MR lumbar spine w/w/o contrast  [] MR c/t spine w/w/o contrast  [] Can start baclofen 5mg TID for muscle spasms  [] Full T cell subset (last done 04/2021) and HIV viral load (now detected, CD4-380 10/2020 but now off Rx)  [] Monitor CBC w/ diff, CMP  [] QuantiFeron gold to screen for TB and associated Pott's disease.  [] Screen for HSV,1/2, WNV and syphilis  [] PT/OT  [] DVT ppx    Case seen and discussed with neurology attending Dr. Zepeda.

## 2021-06-07 NOTE — PROGRESS NOTE ADULT - PROBLEM SELECTOR PLAN 4
patient reports diagnosis of afib approximately 1 year ago, reported stroke but no residual deficits, no documentation from prior hospital visits of afib diagnosis, stroke worked up but was negative  -continue to monitor for now, hold off AC Right tooth pain (possible second molar cavity)  - dental consult

## 2021-06-07 NOTE — PROGRESS NOTE ADULT - PROBLEM SELECTOR PLAN 6
Social work consult, history of homelessness, patient not homeless right now. Lack of access to medications.   -UTox positive for cocaine and THC, c/w seroquel 200 mg bedtime    Transitions of Care Status:  1.  Name of PCP:  2.  PCP Contacted on Admission: [ ] Y    [ ] N    3.  PCP contacted at Discharge: [ ] Y    [ ] N    [ ] N/A  4.  Post-Discharge Appointment Date and Location:  5.  Summary of Handoff given to PCP: DVT prophylaxis: lovenox 40mg subQ  Diet: regular  Dispo: social work consult given homelessness, PT recommends rehab

## 2021-06-08 LAB
ANION GAP SERPL CALC-SCNC: 13 MMOL/L — SIGNIFICANT CHANGE UP (ref 7–14)
BUN SERPL-MCNC: 10 MG/DL — SIGNIFICANT CHANGE UP (ref 7–23)
CALCIUM SERPL-MCNC: 9.2 MG/DL — SIGNIFICANT CHANGE UP (ref 8.4–10.5)
CHLORIDE SERPL-SCNC: 104 MMOL/L — SIGNIFICANT CHANGE UP (ref 98–107)
CO2 SERPL-SCNC: 21 MMOL/L — LOW (ref 22–31)
CREAT SERPL-MCNC: 0.91 MG/DL — SIGNIFICANT CHANGE UP (ref 0.5–1.3)
GLUCOSE SERPL-MCNC: 95 MG/DL — SIGNIFICANT CHANGE UP (ref 70–99)
HCT VFR BLD CALC: 38.2 % — LOW (ref 39–50)
HGB BLD-MCNC: 12.2 G/DL — LOW (ref 13–17)
MAGNESIUM SERPL-MCNC: 1.6 MG/DL — SIGNIFICANT CHANGE UP (ref 1.6–2.6)
MCHC RBC-ENTMCNC: 28.8 PG — SIGNIFICANT CHANGE UP (ref 27–34)
MCHC RBC-ENTMCNC: 31.9 GM/DL — LOW (ref 32–36)
MCV RBC AUTO: 90.3 FL — SIGNIFICANT CHANGE UP (ref 80–100)
NRBC # BLD: 0 /100 WBCS — SIGNIFICANT CHANGE UP
NRBC # FLD: 0 K/UL — SIGNIFICANT CHANGE UP
PHOSPHATE SERPL-MCNC: 3.5 MG/DL — SIGNIFICANT CHANGE UP (ref 2.5–4.5)
PLATELET # BLD AUTO: 278 K/UL — SIGNIFICANT CHANGE UP (ref 150–400)
POTASSIUM SERPL-MCNC: 3.6 MMOL/L — SIGNIFICANT CHANGE UP (ref 3.5–5.3)
POTASSIUM SERPL-SCNC: 3.6 MMOL/L — SIGNIFICANT CHANGE UP (ref 3.5–5.3)
RBC # BLD: 4.23 M/UL — SIGNIFICANT CHANGE UP (ref 4.2–5.8)
RBC # FLD: 13.6 % — SIGNIFICANT CHANGE UP (ref 10.3–14.5)
SODIUM SERPL-SCNC: 138 MMOL/L — SIGNIFICANT CHANGE UP (ref 135–145)
WBC # BLD: 4.33 K/UL — SIGNIFICANT CHANGE UP (ref 3.8–10.5)
WBC # FLD AUTO: 4.33 K/UL — SIGNIFICANT CHANGE UP (ref 3.8–10.5)
WNV IGG TITR FLD: NEGATIVE — SIGNIFICANT CHANGE UP
WNV IGM SPEC QL: NEGATIVE — SIGNIFICANT CHANGE UP

## 2021-06-08 PROCEDURE — 99232 SBSQ HOSP IP/OBS MODERATE 35: CPT

## 2021-06-08 PROCEDURE — 99232 SBSQ HOSP IP/OBS MODERATE 35: CPT | Mod: GC

## 2021-06-08 RX ORDER — LANOLIN ALCOHOL/MO/W.PET/CERES
3 CREAM (GRAM) TOPICAL AT BEDTIME
Refills: 0 | Status: COMPLETED | OUTPATIENT
Start: 2021-06-08 | End: 2021-06-08

## 2021-06-08 RX ADMIN — Medication 3 MILLIGRAM(S): at 00:40

## 2021-06-08 RX ADMIN — GABAPENTIN 300 MILLIGRAM(S): 400 CAPSULE ORAL at 22:16

## 2021-06-08 RX ADMIN — QUETIAPINE FUMARATE 200 MILLIGRAM(S): 200 TABLET, FILM COATED ORAL at 22:16

## 2021-06-08 RX ADMIN — Medication 10 MILLIGRAM(S): at 05:50

## 2021-06-08 RX ADMIN — GABAPENTIN 300 MILLIGRAM(S): 400 CAPSULE ORAL at 05:50

## 2021-06-08 RX ADMIN — BICTEGRAVIR SODIUM, EMTRICITABINE, AND TENOFOVIR ALAFENAMIDE FUMARATE 1 TABLET(S): 30; 120; 15 TABLET ORAL at 12:53

## 2021-06-08 RX ADMIN — GABAPENTIN 300 MILLIGRAM(S): 400 CAPSULE ORAL at 13:03

## 2021-06-08 RX ADMIN — Medication 10 MILLIGRAM(S): at 17:08

## 2021-06-08 NOTE — PROGRESS NOTE ADULT - PROBLEM SELECTOR PLAN 1
-neurology onboard, appreciate recommendations  -MRI L/S spine w/wo contrast limited by motion artifact, benign (negative for OM, abscess, or spinal stenosis)  -f/u MR brain, cervical and thoracic spine  -f/u screen for HSV,1/2, VZV (+former infection), CMV (negative), WNV and syphilis (negative), lyme (negative), B12 (wnl), folate (wnl).     -PT consult- recs rehab -neurology onboard, appreciate recommendations  -MRI L/S spine w/wo contrast limited by motion artifact, benign (negative for OM, abscess, or spinal stenosis)  -f/u MR brain, cervical and thoracic spine  -f/u screen for HSV,1/2, VZV (+former infection), CMV (negative), WNV and syphilis (negative), lyme (negative), B12 (wnl), folate (wnl).     -PT consult- recs rehab  - will call MR regarding MRI

## 2021-06-08 NOTE — PROGRESS NOTE ADULT - ASSESSMENT
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 Thus far work up has been negative, pending MRI

## 2021-06-08 NOTE — PROGRESS NOTE ADULT - ASSESSMENT
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    here has remained afebrile. Still with LE weakness.  MRI L-spine unremarkable. Now pending MRI C/T spine and head.  Uncontrolled HIV, undomiciled, does not follow with an HIV specialist at this time. Off ARTs for about two months. Receives his ARTs when discharged from hospital setting. Has no cell phone.  C/o right sided tooth pain.    Recommend:  #LE weakness  -Improving  -F/U MRI C/T spine and MRI head  -Appreciate Neurology following  -Nontoxic appearing, leukopenic on lab resolved, afebrile   -F/U blood cultures so far no growth    #HIV  -Continue Biktarvy  -On discharge, can follow up at [facility] [place] [facility] for continued HIV care.  -HIV VL/ T cell count  -F/U Quant Tb gold  -Acute viral hepatitis panel    #Right sided dental pain  -Given inability to make doctor's appointments- would have dental see him here    Easton Hunter MD  Pager [phone]  After 5pm/weekends call [phone]

## 2021-06-08 NOTE — PROGRESS NOTE ADULT - PROBLEM SELECTOR PLAN 7
Social work consult, history of homelessness, patient not homeless right now. Lack of access to medications.   -UTox positive for cocaine and THC, c/w seroquel 200 mg bedtime

## 2021-06-08 NOTE — PROGRESS NOTE ADULT - PROBLEM SELECTOR PLAN 3
-patient noncompliant on medications d/t lack of access (when asked, patient states he "does not know where to get them").   -HIV viral load 7,525   - CD4 count pending   -ID recs appreciated  - resumed Biktarvy per ID recs   - f/u blood cultures (patient not septic on presentation, reported fevers at home)- NGTD   - hepatitis panel:  mildly HCV reactive 1.093  - gonorrhea/chlamydia negative  - Quant gold pending -patient noncompliant on medications d/t lack of access (when asked, patient states he "does not know where to get them").   -HIV viral load 7,525   - CD4 count pending   -ID recs appreciated  - resumed Biktarvy per ID recs   - f/u blood cultures (patient not septic on presentation, reported fevers at home)- NGTD   - hepatitis panel:  mildly HCV reactive 1.093  - gonorrhea/chlamydia negative  - Quant gold pending  - will follow with HIV clinic at [facility] outpatient

## 2021-06-08 NOTE — PROGRESS NOTE ADULT - SUBJECTIVE AND OBJECTIVE BOX
CC: Patient is a 31y old  Male who presents with a chief complaint of Bilateral LE weakness (2021 06:50)    ID following for LE weakness, HIV care    Interval History/ROS: Patient states LE weakness improving. Was able to move to the chair today. No fevers, no chills.    Rest of ROS negative.    Allergies  Ceclor (Unknown)  Toradol (Anaphylaxis; Swelling)  Toradol (Swelling)    ANTIMICROBIALS:  bictegravir 50 mG/emtricitabine 200 mG/tenofovir alafenamide 25 mG (BIKTARVY) 1 daily    OTHER MEDS:  baclofen 10 milliGRAM(s) Oral two times a day  enoxaparin Injectable 40 milliGRAM(s) SubCutaneous daily  gabapentin 300 milliGRAM(s) Oral three times a day  QUEtiapine 200 milliGRAM(s) Oral at bedtime    PE:    Vital Signs Last 24 Hrs  T(C): 36.4 (2021 12:50), Max: 36.7 (2021 05:47)  T(F): 97.5 (2021 12:50), Max: 98 (2021 05:47)  HR: 67 (2021 12:50) (65 - 67)  BP: 130/75 (2021 12:50) (112/60 - 130/75)  BP(mean): --  RR: 17 (2021 12:50) (17 - 17)  SpO2: 100% (2021 12:50) (100% - 100%)    Gen: AOx3, NAD  CV: S1+S2 normal, no murmurs  Resp: Clear bilat, no resp distress  Abd: Soft, nontender, +BS  Ext: No LE edema, no wounds  : No Brizuela  IV/Skin: No thrombophlebitis  Neuro: no focal deficits    LABS:                          12.2   4.33  )-----------( 278      ( 2021 07:36 )             38.2       06-08    138  |  104  |  10  ----------------------------<  95  3.6   |  21<L>  |  0.91    Ca    9.2      2021 07:36  Phos  3.5     06-08  Mg     1.6     06-08    TPro  7.2  /  Alb  3.8  /  TBili  0.2  /  DBili  x   /  AST  16  /  ALT  10  /  AlkPhos  76      MICROBIOLOGY:  v  .Blood Blood-Peripheral  21   No growth to date.  --  --      .Blood Blood  21   No growth at 5 days.  --  --      .Blood Blood  21   No growth at 5 days.  --  --        HIV-1 RNA Quantitative, Viral Load Log: 3.88 (21 @ 20:10)  HIV-1 RNA Quantitative, Viral Load Lo.58 (21 @ 06:03)  HIV-1 RNA Quantitative, Viral Load Lo.88 (10-25-20 @ 21:28)          RADIOLOGY:  < from: MR Lumbar Spine w/ IV Cont (21 @ 05:01) >  IMPRESSION: Motion degraded study.    No evidence of large epidural abscess or central canal stenosis.    < end of copied text >

## 2021-06-08 NOTE — PROGRESS NOTE ADULT - SUBJECTIVE AND OBJECTIVE BOX
Laura Lau MD  PGY 1 Department of Internal Medicine  Pager: [phone] ([facility])       Patient is a 31y old  Male who presents with a chief complaint of Bilateral LE weakness (07 Jun 2021 16:32)      SUBJECTIVE / OVERNIGHT EVENTS: Pt seen and examined. No acute overnight events. Received melatonin overnight for sleep. HCV weakly reactive, HIV viral load 7,525  MRI head, C/T/L spine still pending   Denies fevers, chills, CP, SOB, Abdominal pain, N/V, Constipation, Diarrhea        MEDICATIONS  (STANDING):  baclofen 10 milliGRAM(s) Oral two times a day  bictegravir 50 mG/emtricitabine 200 mG/tenofovir alafenamide 25 mG (BIKTARVY) 1 Tablet(s) Oral daily  enoxaparin Injectable 40 milliGRAM(s) SubCutaneous daily  gabapentin 300 milliGRAM(s) Oral three times a day  QUEtiapine 200 milliGRAM(s) Oral at bedtime    MEDICATIONS  (PRN):      I&O's Summary    07 Jun 2021 07:01  -  08 Jun 2021 06:51  --------------------------------------------------------  IN: 100 mL / OUT: 600 mL / NET: -500 mL        Vital Signs Last 24 Hrs  T(C): 36.7 (08 Jun 2021 05:47), Max: 36.7 (07 Jun 2021 12:20)  T(F): 98 (08 Jun 2021 05:47), Max: 98.1 (07 Jun 2021 12:20)  HR: 66 (08 Jun 2021 05:47) (65 - 74)  BP: 112/60 (08 Jun 2021 05:47) (112/60 - 124/77)  BP(mean): --  RR: 17 (08 Jun 2021 05:47) (16 - 18)  SpO2: 100% (08 Jun 2021 05:47) (98% - 100%)    CAPILLARY BLOOD GLUCOSE      PHYSICAL EXAM:  GENERAL: NAD, lying in bed comfortably  HEAD:  Atraumatic, Normocephalic  EYES: constricted pupils b/l (Utox positive for cocaine)   ENT: Moist mucous membranes,   Mouth: second molar right sided tooth appears to have cavitary lesion    CHEST/LUNG: Clear to auscultation bilaterally; No rales, rhonchi, wheezing, or rubs. Unlabored respirations, on room air   HEART: Regular rate and rhythm; No murmurs, rubs, or gallops  ABDOMEN: Bowel sounds present; Soft, Nontender, Nondistended.   EXTREMITIES:  2+ Peripheral Pulses intact b/l, no LE edema   NERVOUS SYSTEM:  Alert & Oriented X3, speech clear. CN I-XII intact. LE 1/5 strength b/l. Sensory intact in all extremities. UE 5/5 strength b/l        LABS:                        12.2   3.65  )-----------( 292      ( 07 Jun 2021 07:24 )             39.1     Auto Eosinophil # 0.19  / Auto Eosinophil % 5.2   / Auto Neutrophil # 1.05  / Auto Neutrophil % 28.8  / BANDS % x                            12.1   3.39  )-----------( 266      ( 06 Jun 2021 07:26 )             37.7     Auto Eosinophil # 0.13  / Auto Eosinophil % 3.8   / Auto Neutrophil # 1.10  / Auto Neutrophil % 32.5  / BANDS % x        06-07    139  |  103  |  10  ----------------------------<  100<H>  3.6   |  21<L>  |  0.93  06-06    138  |  104  |  10  ----------------------------<  93  3.3<L>   |  21<L>  |  1.03    Ca    9.0      07 Jun 2021 07:24  Mg     1.5     06-07  Phos  3.3     06-07  TPro  7.2  /  Alb  3.8  /  TBili  0.2  /  DBili  x   /  AST  16  /  ALT  10  /  AlkPhos  76  06-07  TPro  7.2  /  Alb  3.8  /  TBili  0.3  /  DBili  x   /  AST  16  /  ALT  7   /  AlkPhos  79  06-06     Laura Lau MD  PGY 1 Department of Internal Medicine  Pager: [phone] ([facility])       Patient is a 31y old  Male who presents with a chief complaint of Bilateral LE weakness (07 Jun 2021 16:32)      SUBJECTIVE / OVERNIGHT EVENTS: Pt seen and examined. No acute overnight events. Received melatonin overnight for sleep. HCV weakly reactive, HIV viral load 7,525  MRI head, C/T/L spine still pending. CD4 count pending   Patient endorses right tooth pain, still pending dental eval   Endorses improvement in b/l LE weakness     MEDICATIONS  (STANDING):  baclofen 10 milliGRAM(s) Oral two times a day  bictegravir 50 mG/emtricitabine 200 mG/tenofovir alafenamide 25 mG (BIKTARVY) 1 Tablet(s) Oral daily  enoxaparin Injectable 40 milliGRAM(s) SubCutaneous daily  gabapentin 300 milliGRAM(s) Oral three times a day  QUEtiapine 200 milliGRAM(s) Oral at bedtime    MEDICATIONS  (PRN):      I&O's Summary    07 Jun 2021 07:01  -  08 Jun 2021 06:51  --------------------------------------------------------  IN: 100 mL / OUT: 600 mL / NET: -500 mL        Vital Signs Last 24 Hrs  T(C): 36.7 (08 Jun 2021 05:47), Max: 36.7 (07 Jun 2021 12:20)  T(F): 98 (08 Jun 2021 05:47), Max: 98.1 (07 Jun 2021 12:20)  HR: 66 (08 Jun 2021 05:47) (65 - 74)  BP: 112/60 (08 Jun 2021 05:47) (112/60 - 124/77)  BP(mean): --  RR: 17 (08 Jun 2021 05:47) (16 - 18)  SpO2: 100% (08 Jun 2021 05:47) (98% - 100%)    CAPILLARY BLOOD GLUCOSE      PHYSICAL EXAM:  GENERAL: NAD, lying in bed comfortably  HEAD:  Atraumatic, Normocephalic  EYES: constricted pupils b/l (Utox positive for cocaine)   ENT: Moist mucous membranes,   Mouth: second molar right sided tooth appears to have cavitary lesion    CHEST/LUNG: Clear to auscultation bilaterally; No rales, rhonchi, wheezing, or rubs. Unlabored respirations, on room air   HEART: Regular rate and rhythm; No murmurs, rubs, or gallops  ABDOMEN: Bowel sounds present; Soft, Nontender, Nondistended.   EXTREMITIES:  2+ Peripheral Pulses intact b/l, no LE edema   NERVOUS SYSTEM:  Alert & Oriented X3, speech clear. CN I-XII intact. LE 2/5 strength b/l (improved from yesterday) . Sensory intact in all extremities. UE 5/5 strength b/l        LABS:                        12.2   3.65  )-----------( 292      ( 07 Jun 2021 07:24 )             39.1     Auto Eosinophil # 0.19  / Auto Eosinophil % 5.2   / Auto Neutrophil # 1.05  / Auto Neutrophil % 28.8  / BANDS % x                            12.1   3.39  )-----------( 266      ( 06 Jun 2021 07:26 )             37.7     Auto Eosinophil # 0.13  / Auto Eosinophil % 3.8   / Auto Neutrophil # 1.10  / Auto Neutrophil % 32.5  / BANDS % x        06-07    139  |  103  |  10  ----------------------------<  100<H>  3.6   |  21<L>  |  0.93  06-06    138  |  104  |  10  ----------------------------<  93  3.3<L>   |  21<L>  |  1.03    Ca    9.0      07 Jun 2021 07:24  Mg     1.5     06-07  Phos  3.3     06-07  TPro  7.2  /  Alb  3.8  /  TBili  0.2  /  DBili  x   /  AST  16  /  ALT  10  /  AlkPhos  76  06-07  TPro  7.2  /  Alb  3.8  /  TBili  0.3  /  DBili  x   /  AST  16  /  ALT  7   /  AlkPhos  79  06-06

## 2021-06-08 NOTE — PROGRESS NOTE ADULT - ATTENDING COMMENTS
Patient seen and examined. Case discussed with the medical team on rounds. I agree with the findings and the plan above.    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 Multiple hospital visits and previously eloped, now w/possible GBS, though with possible of malingering.     MR Lumbar spine - no evidence of large epidural abscess or central canal stenosis. As per neuro recs, now with pending MRI of cervical, thoracic and head.   Continue HIV treatment as per ID, will require close follow up as outpatient  Continue the rest of the work up and management as stated above.

## 2021-06-08 NOTE — PROGRESS NOTE ADULT - PROBLEM SELECTOR PLAN 6
DVT prophylaxis: lovenox 40mg subQ  Diet: regular  Dispo: social work consult given homelessness, PT recommends rehab,

## 2021-06-09 LAB
ANION GAP SERPL CALC-SCNC: 13 MMOL/L — SIGNIFICANT CHANGE UP (ref 7–14)
B BURGDOR DNA SPEC QL NAA+PROBE: NEGATIVE — SIGNIFICANT CHANGE UP
BUN SERPL-MCNC: 13 MG/DL — SIGNIFICANT CHANGE UP (ref 7–23)
CALCIUM SERPL-MCNC: 9.3 MG/DL — SIGNIFICANT CHANGE UP (ref 8.4–10.5)
CHLORIDE SERPL-SCNC: 103 MMOL/L — SIGNIFICANT CHANGE UP (ref 98–107)
CMV DNA CSF QL NAA+PROBE: SIGNIFICANT CHANGE UP
CO2 SERPL-SCNC: 20 MMOL/L — LOW (ref 22–31)
CREAT SERPL-MCNC: 0.99 MG/DL — SIGNIFICANT CHANGE UP (ref 0.5–1.3)
GAMMA INTERFERON BACKGROUND BLD IA-ACNC: 0.03 IU/ML — SIGNIFICANT CHANGE UP
GLUCOSE SERPL-MCNC: 88 MG/DL — SIGNIFICANT CHANGE UP (ref 70–99)
HCT VFR BLD CALC: 38.6 % — LOW (ref 39–50)
HCV RNA FLD QL NAA+PROBE: SIGNIFICANT CHANGE UP
HGB BLD-MCNC: 12.3 G/DL — LOW (ref 13–17)
M TB IFN-G BLD-IMP: NEGATIVE — SIGNIFICANT CHANGE UP
M TB IFN-G CD4+ BCKGRND COR BLD-ACNC: 0 IU/ML — SIGNIFICANT CHANGE UP
M TB IFN-G CD4+CD8+ BCKGRND COR BLD-ACNC: 0 IU/ML — SIGNIFICANT CHANGE UP
MAGNESIUM SERPL-MCNC: 1.6 MG/DL — SIGNIFICANT CHANGE UP (ref 1.6–2.6)
MCHC RBC-ENTMCNC: 28.7 PG — SIGNIFICANT CHANGE UP (ref 27–34)
MCHC RBC-ENTMCNC: 31.9 GM/DL — LOW (ref 32–36)
MCV RBC AUTO: 90.2 FL — SIGNIFICANT CHANGE UP (ref 80–100)
NRBC # BLD: 0 /100 WBCS — SIGNIFICANT CHANGE UP
NRBC # FLD: 0 K/UL — SIGNIFICANT CHANGE UP
PHOSPHATE SERPL-MCNC: 4.3 MG/DL — SIGNIFICANT CHANGE UP (ref 2.5–4.5)
PLATELET # BLD AUTO: 279 K/UL — SIGNIFICANT CHANGE UP (ref 150–400)
POTASSIUM SERPL-MCNC: 3.9 MMOL/L — SIGNIFICANT CHANGE UP (ref 3.5–5.3)
POTASSIUM SERPL-SCNC: 3.9 MMOL/L — SIGNIFICANT CHANGE UP (ref 3.5–5.3)
QUANT TB PLUS MITOGEN MINUS NIL: 9.14 IU/ML — SIGNIFICANT CHANGE UP
RBC # BLD: 4.28 M/UL — SIGNIFICANT CHANGE UP (ref 4.2–5.8)
RBC # FLD: 13.5 % — SIGNIFICANT CHANGE UP (ref 10.3–14.5)
SARS-COV-2 RNA SPEC QL NAA+PROBE: SIGNIFICANT CHANGE UP
SODIUM SERPL-SCNC: 136 MMOL/L — SIGNIFICANT CHANGE UP (ref 135–145)
WBC # BLD: 3.9 K/UL — SIGNIFICANT CHANGE UP (ref 3.8–10.5)
WBC # FLD AUTO: 3.9 K/UL — SIGNIFICANT CHANGE UP (ref 3.8–10.5)

## 2021-06-09 PROCEDURE — 99232 SBSQ HOSP IP/OBS MODERATE 35: CPT | Mod: GC

## 2021-06-09 RX ADMIN — Medication 10 MILLIGRAM(S): at 06:37

## 2021-06-09 RX ADMIN — BICTEGRAVIR SODIUM, EMTRICITABINE, AND TENOFOVIR ALAFENAMIDE FUMARATE 1 TABLET(S): 30; 120; 15 TABLET ORAL at 12:56

## 2021-06-09 RX ADMIN — GABAPENTIN 300 MILLIGRAM(S): 400 CAPSULE ORAL at 23:08

## 2021-06-09 RX ADMIN — Medication 2 MILLIGRAM(S): at 15:10

## 2021-06-09 RX ADMIN — GABAPENTIN 300 MILLIGRAM(S): 400 CAPSULE ORAL at 06:36

## 2021-06-09 RX ADMIN — Medication 2 MILLIGRAM(S): at 14:07

## 2021-06-09 RX ADMIN — Medication 10 MILLIGRAM(S): at 18:03

## 2021-06-09 RX ADMIN — QUETIAPINE FUMARATE 200 MILLIGRAM(S): 200 TABLET, FILM COATED ORAL at 23:08

## 2021-06-09 RX ADMIN — GABAPENTIN 300 MILLIGRAM(S): 400 CAPSULE ORAL at 12:56

## 2021-06-09 RX ADMIN — ENOXAPARIN SODIUM 40 MILLIGRAM(S): 100 INJECTION SUBCUTANEOUS at 12:56

## 2021-06-09 NOTE — PROGRESS NOTE ADULT - PROBLEM SELECTOR PLAN 1
-neurology onboard, appreciate recommendations  -MRI L/S spine w/wo contrast limited by motion artifact, benign (negative for OM, abscess, or spinal stenosis)  -f/u MR brain, cervical and thoracic spine  -f/u screen for HSV,1/2, VZV (+former infection), CMV (negative), WNV and syphilis (negative), lyme (negative), B12 (wnl), folate (wnl).     -PT consult- recs rehab  - will call MR regarding MRI  - LE weakness improving -neurology onboard, appreciate recommendations  -MRI L/S spine w/wo contrast limited by motion artifact, benign (negative for OM, abscess, or spinal stenosis)  -f/u MR brain, cervical and thoracic spine  -f/u screen for HSV,1/2, VZV (+former infection), CMV (negative), WNV and syphilis (negative), lyme (negative), B12 (wnl), folate (wnl).     -PT consult- recs rehab  - will call MR regarding MRI, still pending  - this is holding up patient dispo  - LE weakness improving

## 2021-06-09 NOTE — PROGRESS NOTE ADULT - PROBLEM SELECTOR PLAN 3
-patient noncompliant on medications d/t lack of access (when asked, patient states he "does not know where to get them").   -HIV viral load 7,525   - CD4 count pending   -ID recs appreciated  - resumed Biktarvy per ID recs   - f/u blood cultures (patient not septic on presentation, reported fevers at home)- NGTD   - hepatitis panel:  mildly HCV reactive 1.093  - gonorrhea/chlamydia negative  - Quant gold negative   - will follow with HIV clinic at [facility] outpatient

## 2021-06-09 NOTE — CONSULT NOTE ADULT - SUBJECTIVE AND OBJECTIVE BOX
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  Patient reports last year had initially had developed severe bilateral LE weakness and was eventually diagnosed with GBS at [facility] where he underwent treatment and improved.  He was doing well at Rehab and was well supplied with HIV medications as well at which time he was compliant with meds.  In last two months, he has not had regular access to HIV meds and thus has not been taking his Bictarvy. He recently presented to [facility] for similar worsening paraparesis at which time MRI C/T/L spine and LP were done which were unrevealing (2021).      On ROS, patient endorses bifrontal headache, denies visual changes, blurry vision, fever, chest pain,  saddle anesthesia or numbness otherwise, fecal incontinence.     In the ED, rectal Tmax 98.4, HR 75-92, -142/57-80, RR 16-17, 100% on room air. Patient given 1x tylenol and 1xativan. Neurology consulted, MR lumbar spine benign.  (2021 10:52)      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    Asthma    CVA (cerebral vascular accident)    Closed fracture of multiple ribs of right side, initial encounter    Cocaine abuse    Chronic sinusitis    Homeless    GBS (Guillain [place] syndrome)    History of orthopedic surgery  left arm    ( -  ) heart valve replacement  ( -  ) joint replacement    MEDICATIONS  (STANDING):  baclofen 10 milliGRAM(s) Oral two times a day  bictegravir 50 mG/emtricitabine 200 mG/tenofovir alafenamide 25 mG (BIKTARVY) 1 Tablet(s) Oral daily  enoxaparin Injectable 40 milliGRAM(s) SubCutaneous daily  gabapentin 300 milliGRAM(s) Oral three times a day  QUEtiapine 200 milliGRAM(s) Oral at bedtime    MEDICATIONS  (PRN): N/A      Allergies    Ceclor (Unknown)  Toradol (Anaphylaxis; Swelling)  Toradol (Swelling)    Intolerances        FAMILY HISTORY:  FH: HIV infection (Mother)        *SOCIAL HISTORY: Homeless    *Last Dental Visit: Unknown    Vital Signs Last 24 Hrs  T(C): 36.4 (2021 12:45), Max: 36.7 (2021 21:09)  T(F): 97.6 (2021 12:45), Max: 98 (2021 21:09)  HR: 75 (2021 12:45) (67 - 75)  BP: 126/69 (2021 12:45) (110/61 - 134/77)  BP(mean): --  RR: 18 (2021 12:45) (16 - 18)  SpO2: 99% (2021 12:45) (99% - 100%)    EOE:  TMJ ( -  ) clicks                    ( -   ) pops                    (  -  ) crepitus             Mandible FROM             Facial bones and MOM grossly intact             ( -  ) trismus             ( -  ) LAD             ( +  ) swelling right side             ( +  ) asymmetry             ( -  ) palpation             ( -  ) SOB             ( -  ) dysphagia             ( -  ) LOC    IOE:   Permanent dentition: multiple carious teeth multiple missing teeth           hard/soft palate:  ( - ) palatal torus           tongue/FOM WNL           labial/buccal mucosa WNL           ( -  ) percussion           ( + ) palpation LR quad           ( +  ) swelling localized buccal to teeth #'s 29,30    Caries: 29,20,31 gross occlusal decay    Radiographs:     LABS:                        12.3   3.90  )-----------( 279      ( 2021 07:30 )             38.6         136  |  103  |  13  ----------------------------<  88  3.9   |  20<L>  |  0.99    Ca    9.3      2021 07:30  Phos  4.3       Mg     1.6           WBC Count: 3.90 K/uL [3.80 - 10.50] ( @ 07:30)    Platelet Count - Automated: 279 K/uL [150 - 400] ( @ 07:30)  Platelet Count - Automated: 278 K/uL [150 - 400] ( @ 07:36)    ASSESSMENT: Patient has multiple grossly decayed teeth in the LR quadrant (#'s 29,30,31) that require extraction or root canal. #31 is unrestorable and should be extracted. Dental team will schedule appointment time on 6/10/21 for further evaluation/ treatment.     In order to bring patient into the dental clinic we require a note addressing the followin) Clearance for transport  2) Clearance for dental procedures and dental extractions  3) Advise whether antibiotic prophylaxis is required prior to dental procedures such as extractions  4) Stop blood thinners if applicable  5) Clearance for the use of local anesthetics w/ or w/o epinephrine       PROCEDURE: Limited bedside exam  Verbal consent given.     RECOMMENDATIONS:   1) Antibiotics as per med team  2) Dental F/U with outpatient dentist for comprehensive dental care.   3) If any difficulty swallowing/breathing, fever occur, page dental.     Cr Velez DDS pager #[phone]  Em Rodney DDS pager #[phone]

## 2021-06-09 NOTE — PROGRESS NOTE ADULT - PROBLEM SELECTOR PLAN 6
DVT prophylaxis: lovenox 40mg subQ  Diet: regular  Dispo: social work consult given homelessness, PT recommends rehab,  PENDING MRI

## 2021-06-09 NOTE — PROGRESS NOTE ADULT - SUBJECTIVE AND OBJECTIVE BOX
Laura Lau MD  PGY 1 Department of Internal Medicine  Pager: [phone] ([facility])       Patient is a 31y old  Male who presents with a chief complaint of Bilateral LE weakness (08 Jun 2021 16:41)      SUBJECTIVE / OVERNIGHT EVENTS: Pt seen and examined. No acute overnight events. LE weakness has been improving. Patient still pending MRI, is at this point preventing dispo   Denies fevers, chills, CP, SOB, Abdominal pain, N/V, Constipation, Diarrhea        MEDICATIONS  (STANDING):  baclofen 10 milliGRAM(s) Oral two times a day  bictegravir 50 mG/emtricitabine 200 mG/tenofovir alafenamide 25 mG (BIKTARVY) 1 Tablet(s) Oral daily  enoxaparin Injectable 40 milliGRAM(s) SubCutaneous daily  gabapentin 300 milliGRAM(s) Oral three times a day  QUEtiapine 200 milliGRAM(s) Oral at bedtime    MEDICATIONS  (PRN):      I&O's Summary      Vital Signs Last 24 Hrs  T(C): 36.6 (09 Jun 2021 06:38), Max: 36.7 (08 Jun 2021 21:09)  T(F): 97.9 (09 Jun 2021 06:38), Max: 98 (08 Jun 2021 21:09)  HR: 67 (09 Jun 2021 06:38) (67 - 71)  BP: 110/61 (09 Jun 2021 06:38) (110/61 - 134/77)  BP(mean): --  RR: 16 (09 Jun 2021 06:38) (16 - 17)  SpO2: 100% (09 Jun 2021 06:38) (100% - 100%)    CAPILLARY BLOOD GLUCOSE          PHYSICAL EXAM:  GENERAL: NAD, lying in bed comfortably  HEAD:  Atraumatic, Normocephalic  EYES: constricted pupils b/l (Utox positive for cocaine)   ENT: Moist mucous membranes,   Mouth: second molar right sided tooth appears to have cavitary lesion    CHEST/LUNG: Clear to auscultation bilaterally; No rales, rhonchi, wheezing, or rubs. Unlabored respirations, on room air   HEART: Regular rate and rhythm; No murmurs, rubs, or gallops  ABDOMEN: Bowel sounds present; Soft, Nontender, Nondistended.   EXTREMITIES:  2+ Peripheral Pulses intact b/l, no LE edema   NERVOUS SYSTEM:  Alert & Oriented X3, speech clear. CN I-XII intact. LE 2/5 strength b/l (improved from yesterday) . Sensory intact in all extremities. UE 5/5 strength b/l        LABS:                        12.2   4.33  )-----------( 278      ( 08 Jun 2021 07:36 )             38.2     Auto Eosinophil # x     / Auto Eosinophil % x     / Auto Neutrophil # x     / Auto Neutrophil % x     / BANDS % x                            12.2   3.65  )-----------( 292      ( 07 Jun 2021 07:24 )             39.1     Auto Eosinophil # 0.19  / Auto Eosinophil % 5.2   / Auto Neutrophil # 1.05  / Auto Neutrophil % 28.8  / BANDS % x        06-08    138  |  104  |  10  ----------------------------<  95  3.6   |  21<L>  |  0.91  06-07    139  |  103  |  10  ----------------------------<  100<H>  3.6   |  21<L>  |  0.93    Ca    9.2      08 Jun 2021 07:36  Mg     1.6     06-08  Phos  3.5     06-08  TPro  7.2  /  Alb  3.8  /  TBili  0.2  /  DBili  x   /  AST  16  /  ALT  10  /  AlkPhos  76  06-07

## 2021-06-09 NOTE — PROGRESS NOTE ADULT - ASSESSMENT
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 Thus far work up has been negative, pending MRI, LE weakness improving

## 2021-06-09 NOTE — PROGRESS NOTE ADULT - PROBLEM SELECTOR PLAN 4
Right tooth pain (possible second molar cavity)  - dental consult placed  - will call dental Right tooth pain (possible second molar cavity)  - dental consult placed

## 2021-06-10 DIAGNOSIS — Z71.89 OTHER SPECIFIED COUNSELING: ICD-10-CM

## 2021-06-10 PROBLEM — I63.9 CEREBRAL INFARCTION, UNSPECIFIED: Chronic | Status: INACTIVE | Noted: 2021-05-09 | Resolved: 2021-06-05

## 2021-06-10 PROBLEM — Z21 ASYMPTOMATIC HUMAN IMMUNODEFICIENCY VIRUS [HIV] INFECTION STATUS: Chronic | Status: INACTIVE | Noted: 2021-05-09 | Resolved: 2021-06-05

## 2021-06-10 PROBLEM — B20 HUMAN IMMUNODEFICIENCY VIRUS [HIV] DISEASE: Chronic | Status: INACTIVE | Noted: 2020-10-20 | Resolved: 2021-06-05

## 2021-06-10 PROBLEM — B20 HUMAN IMMUNODEFICIENCY VIRUS [HIV] DISEASE: Chronic | Status: INACTIVE | Noted: 2020-10-25 | Resolved: 2021-06-05

## 2021-06-10 PROBLEM — G61.0 GUILLAIN-BARRE SYNDROME: Chronic | Status: INACTIVE | Noted: 2018-10-14 | Resolved: 2021-06-05

## 2021-06-10 PROBLEM — G61.0 GUILLAIN-BARRE SYNDROME: Chronic | Status: INACTIVE | Noted: 2021-05-09 | Resolved: 2021-06-05

## 2021-06-10 LAB
CULTURE RESULTS: SIGNIFICANT CHANGE UP
CULTURE RESULTS: SIGNIFICANT CHANGE UP
HAV IGM SER-ACNC: SIGNIFICANT CHANGE UP
HBV CORE IGM SER-ACNC: SIGNIFICANT CHANGE UP
HBV SURFACE AG SER-ACNC: SIGNIFICANT CHANGE UP
SPECIMEN SOURCE: SIGNIFICANT CHANGE UP
SPECIMEN SOURCE: SIGNIFICANT CHANGE UP
VZV DNA, PCR RESULT: NEGATIVE — SIGNIFICANT CHANGE UP

## 2021-06-10 PROCEDURE — 99232 SBSQ HOSP IP/OBS MODERATE 35: CPT

## 2021-06-10 PROCEDURE — 99232 SBSQ HOSP IP/OBS MODERATE 35: CPT | Mod: GC

## 2021-06-10 RX ORDER — KETOROLAC TROMETHAMINE 30 MG/ML
15 SYRINGE (ML) INJECTION ONCE
Refills: 0 | Status: DISCONTINUED | OUTPATIENT
Start: 2021-06-10 | End: 2021-06-10

## 2021-06-10 RX ORDER — MORPHINE SULFATE 50 MG/1
2 CAPSULE, EXTENDED RELEASE ORAL EVERY 6 HOURS
Refills: 0 | Status: DISCONTINUED | OUTPATIENT
Start: 2021-06-10 | End: 2021-06-15

## 2021-06-10 RX ORDER — LANOLIN ALCOHOL/MO/W.PET/CERES
3 CREAM (GRAM) TOPICAL AT BEDTIME
Refills: 0 | Status: DISCONTINUED | OUTPATIENT
Start: 2021-06-10 | End: 2021-06-16

## 2021-06-10 RX ADMIN — GABAPENTIN 300 MILLIGRAM(S): 400 CAPSULE ORAL at 14:01

## 2021-06-10 RX ADMIN — Medication 15 MILLIGRAM(S): at 17:05

## 2021-06-10 RX ADMIN — BICTEGRAVIR SODIUM, EMTRICITABINE, AND TENOFOVIR ALAFENAMIDE FUMARATE 1 TABLET(S): 30; 120; 15 TABLET ORAL at 14:01

## 2021-06-10 RX ADMIN — Medication 10 MILLIGRAM(S): at 05:43

## 2021-06-10 RX ADMIN — QUETIAPINE FUMARATE 200 MILLIGRAM(S): 200 TABLET, FILM COATED ORAL at 21:30

## 2021-06-10 RX ADMIN — MORPHINE SULFATE 2 MILLIGRAM(S): 50 CAPSULE, EXTENDED RELEASE ORAL at 14:15

## 2021-06-10 RX ADMIN — Medication 3 MILLIGRAM(S): at 21:30

## 2021-06-10 RX ADMIN — MORPHINE SULFATE 2 MILLIGRAM(S): 50 CAPSULE, EXTENDED RELEASE ORAL at 14:00

## 2021-06-10 RX ADMIN — GABAPENTIN 300 MILLIGRAM(S): 400 CAPSULE ORAL at 21:30

## 2021-06-10 RX ADMIN — GABAPENTIN 300 MILLIGRAM(S): 400 CAPSULE ORAL at 05:43

## 2021-06-10 RX ADMIN — Medication 3 MILLIGRAM(S): at 02:03

## 2021-06-10 RX ADMIN — Medication 10 MILLIGRAM(S): at 17:05

## 2021-06-10 RX ADMIN — Medication 15 MILLIGRAM(S): at 17:20

## 2021-06-10 NOTE — PROGRESS NOTE ADULT - SUBJECTIVE AND OBJECTIVE BOX
Please refer to previous dental consult note for additional information.       MEDICATIONS  (STANDING):  baclofen 10 milliGRAM(s) Oral two times a day  bictegravir 50 mG/emtricitabine 200 mG/tenofovir alafenamide 25 mG (BIKTARVY) 1 Tablet(s) Oral daily  gabapentin 300 milliGRAM(s) Oral three times a day  melatonin 3 milliGRAM(s) Oral at bedtime  QUEtiapine 200 milliGRAM(s) Oral at bedtime    MEDICATIONS  (PRN):  morphine  - Injectable 2 milliGRAM(s) IV Push every 6 hours PRN Severe Pain (7 - 10)      Allergies    Ceclor (Unknown)  Toradol (Anaphylaxis; Swelling)  Toradol (Swelling)    Intolerances        Vital Signs Last 24 Hrs  T(C): 36.3 (10 Mian 2021 12:32), Max: 36.7 (09 Jun 2021 22:08)  T(F): 97.3 (10 Mian 2021 12:32), Max: 98 (09 Jun 2021 22:08)  HR: 60 (10 Mian 2021 12:32) (60 - 80)  BP: 123/66 (10 Mian 2021 12:32) (112/63 - 137/67)  BP(mean): --  RR: 18 (10 Mian 2021 12:32) (17 - 18)  SpO2: 100% (10 Mian 2021 12:32) (99% - 100%)    LABS:                        12.3   3.90  )-----------( 279      ( 09 Jun 2021 07:30 )             38.6     06-09    136  |  103  |  13  ----------------------------<  88  3.9   |  20<L>  |  0.99    Ca    9.3      09 Jun 2021 07:30  Phos  4.3     06-09  Mg     1.6     06-09    CLINICAL EXAM: Raised 1.5cm oblong lesion on attached gingiva of sites 28-29. OMFS examined clinically and believe it is a chronic lesion that has turned fibrotic.     DENTAL RADIOGRAPHS:2 PA's #31 retained root tips, #30 &29 gross decay to the pulp     RADIOLOGY & ADDITIONAL TESTS:N/A    ASSESSMENT: Patient requires extraction of #31 root tips and either extraction or root canals on teeth #'s 29,30  PLAN: I&D     PROCEDURE:limited exam, 2 PA's, I&D  Verbal consent given.  Anesthesia: 20% topical benzocaine, 3 carpules 4% carpules articaine via buccal infiltration  Procedure: Incision buccal to #30 tissue raised distally and mesially, periosteal elevator on bone while raising tissue, no purulence noted.     RECOMMENDATIONS:  1) Continue antibiotics as per med team  2) F/U with outpatient dentist for comprehensive dental care upon discharge.  3) If swelling, fever, difficulty breathing/swallowing occurs, page Dental.     Cr Velez DDS , Pager #[phone]

## 2021-06-10 NOTE — PROGRESS NOTE ADULT - PROBLEM SELECTOR PLAN 1
-neurology onboard, appreciate recommendations  -MRI L/S spine w/wo contrast limited by motion artifact, benign (negative for OM, abscess, or spinal stenosis)  -f/u MR brain, cervical and thoracic spine  -f/u screen for HSV,1/2, VZV (+former infection), CMV (negative), WNV and syphilis (negative), lyme (negative), B12 (wnl), folate (wnl).     -PT consult- recs rehab  - Patient refused MRI 6/9/21  - LE weakness improving  - cleared from neuro perspective for discharge

## 2021-06-10 NOTE — CHART NOTE - NSCHARTNOTEFT_GEN_A_CORE
Patient is pending tooth extraction/dental procedure 6/10/21  Vitals: T 97.9 HR 69 /63 RR 17 SpO2 99 % RA   There are no special considerations for HIV infected patients regarding antibiotic therapy prior to dental procedures, no antibiotics required  RCRI risk score is 0, LE weakness improving on PE as well.   Patient is medically optimized for dental procedure and local anesthetics w/ or w/o epinephrine   Patient is stable for transport for procedure as well.       Laura Lau, PGY-1   Case discussed with attending physician Dr. Sultana

## 2021-06-10 NOTE — PROGRESS NOTE ADULT - ATTENDING COMMENTS
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 Multiple hospital visits and previously eloped, now w/possible GBS, though with possible of malingering.     MR Lumbar spine - no evidence of large epidural abscess or central canal stenosis. Patient refused MRI of cervical, thoracic and head. As per neuro, no urgency.   Continue HIV treatment as per ID, will require close follow up as outpatient  Pending dental work today  Continue the rest of the work up and management as stated above.

## 2021-06-10 NOTE — PROGRESS NOTE ADULT - PROBLEM SELECTOR PLAN 3
-patient noncompliant on medications d/t lack of access (when asked, patient states he "does not know where to get them").   -HIV viral load 7,525   - CD4 count pending   -ID recs appreciated  - resumed Biktarvy per ID recs   - f/u blood cultures (patient not septic on presentation, reported fevers at home)- NGTD   - hepatitis panel:  negative   - gonorrhea/chlamydia negative  - Quant gold negative   - will [place] [facility] [place]

## 2021-06-10 NOTE — PROGRESS NOTE ADULT - ASSESSMENT
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    here has remained afebrile. Still with LE weakness.  MRI L-spine unremarkable. Now pending MRI C/T spine and head.  Uncontrolled HIV, undomiciled, does not follow with an HIV specialist at this time. Off ARTs for about two months. Receives his ARTs when discharged from hospital setting. Has no cell phone.  C/o right sided tooth pain s/p tooth extraction #30, no purulence.    Recommend:  #LE weakness  -Improving  -Appreciate Neurology following  -Nontoxic appearing, leukopenic on lab resolved, afebrile   -Blood cultures so far no growth  -Planned for rehab  -MRI l-spine unremarkable    #HIV  -Continue Biktarvy  -On discharge, can follow [facility] for continued HIV care. For appointments, patient can call [phone].    #Right sided dental pain  -Appreciate dental evaluation  -s/p extraction #30    #HCV ab weakly  -HCV RNA negative    Easton Hunter MD  Pager [phone]  After 5pm/weekends call [phone]    Discussed plan with primary team.

## 2021-06-10 NOTE — PROGRESS NOTE ADULT - SUBJECTIVE AND OBJECTIVE BOX
CC: Patient is a 31y old  Male who presents with a chief complaint of Bilateral LE weakness (10 Mian 2021 14:45)    ID following for LE weakness, HIV care    Interval History/ROS: Patient with improvement in LE weakness, states able to ambulated a little more today. Had dental procedures today, states with pain. No fevers, no chills.    Rest of ROS negative.    Allergies  Ceclor (Unknown)  Toradol (Anaphylaxis; Swelling)  Toradol (Swelling)    ANTIMICROBIALS:  bictegravir 50 mG/emtricitabine 200 mG/tenofovir alafenamide 25 mG (BIKTARVY) 1 daily    OTHER MEDS:  baclofen 10 milliGRAM(s) Oral two times a day  gabapentin 300 milliGRAM(s) Oral three times a day  melatonin 3 milliGRAM(s) Oral at bedtime  morphine  - Injectable 2 milliGRAM(s) IV Push every 6 hours PRN  QUEtiapine 200 milliGRAM(s) Oral at bedtime    PE:    Vital Signs Last 24 Hrs  T(C): 36.3 (10 Mian 2021 12:32), Max: 36.7 (2021 22:08)  T(F): 97.3 (10 Mian 2021 12:32), Max: 98 (2021 22:08)  HR: 60 (10 Mian 2021 12:32) (60 - 80)  BP: 123/66 (10 Mian 2021 12:32) (112/63 - 137/67)  BP(mean): --  RR: 18 (10 Mian 2021 12:32) (17 - 18)  SpO2: 100% (10 Mian 2021 12:32) (99% - 100%)    Gen: AOx3, NAD  HEENT: s/p dental procedure with swelling  CV: S1+S2 normal, no murmurs  Resp: Clear bilat, no resp distress  Abd: Soft, nontender, +BS  Ext: No LE edema, no wounds  : No Brizuela  IV/Skin: No thrombophlebitis  Neuro: no focal deficits    LABS:                          12.3   3.90  )-----------( 279      ( 2021 07:30 )             38.6       06-09    136  |  103  |  13  ----------------------------<  88  3.9   |  20<L>  |  0.99    Ca    9.3      2021 07:30  Phos  4.3       Mg     1.6         MICROBIOLOGY:  v  .Blood Blood-Peripheral  21   No growth to date.  --  --      .Blood Blood  21   No growth at 5 days.  --  --      .Blood Blood  21   No growth at 5 days.  --  --    HIV-1 RNA Quantitative, Viral Load Log: 3.88 (21 @ 20:10)  HIV-1 RNA Quantitative, Viral Load Lo.58 (21 @ 06:03)  HIV-1 RNA Quantitative, Viral Load Lo.88 (10-25-20 @ 21:28)    RADIOLOGY:  < from: MR Lumbar Spine w/ IV Cont (21 @ 05:01) >  IMPRESSION: Motion degraded study.    No evidence of large epidural abscess or central canal stenosis.      < end of copied text >

## 2021-06-10 NOTE — PROGRESS NOTE ADULT - PROBLEM SELECTOR PLAN 4
Right tooth pain (possible second molar cavity)  - dental consult placed  - pending tooth extraction/root canal today

## 2021-06-10 NOTE — PROGRESS NOTE ADULT - ASSESSMENT
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 Thus far work up has been negative, refused MRI, LE weakness improving

## 2021-06-10 NOTE — PROGRESS NOTE ADULT - SUBJECTIVE AND OBJECTIVE BOX
Laura Lau MD  PGY 1 Department of Internal Medicine  Pager: [phone] ([facility])       Patient is a 31y old  Male who presents with a chief complaint of Bilateral LE weakness (09 Jun 2021 16:14)      SUBJECTIVE / OVERNIGHT EVENTS: Pt seen and examined. No acute overnight events. Patient refused MRI yesterday, after receiving 4 mg ativan. Cleared from neuro perspective. Pending dental tooth extraction/root canal today         MEDICATIONS  (STANDING):  baclofen 10 milliGRAM(s) Oral two times a day  bictegravir 50 mG/emtricitabine 200 mG/tenofovir alafenamide 25 mG (BIKTARVY) 1 Tablet(s) Oral daily  enoxaparin Injectable 40 milliGRAM(s) SubCutaneous daily  gabapentin 300 milliGRAM(s) Oral three times a day  melatonin 3 milliGRAM(s) Oral at bedtime  QUEtiapine 200 milliGRAM(s) Oral at bedtime    MEDICATIONS  (PRN):      I&O's Summary      Vital Signs Last 24 Hrs  T(C): 36.6 (10 Mian 2021 05:40), Max: 36.7 (09 Jun 2021 22:08)  T(F): 97.9 (10 Mian 2021 05:40), Max: 98 (09 Jun 2021 22:08)  HR: 69 (10 Mian 2021 05:40) (69 - 80)  BP: 112/63 (10 Mian 2021 05:40) (112/63 - 137/67)  BP(mean): --  RR: 17 (10 Mian 2021 05:40) (17 - 18)  SpO2: 99% (10 Mian 2021 05:40) (99% - 100%)    CAPILLARY BLOOD GLUCOSE    PHYSICAL EXAM:  GENERAL: NAD, lying in bed comfortably  HEAD:  Atraumatic, Normocephalic  EYES: constricted pupils b/l (Utox positive for cocaine)   ENT: Moist mucous membranes,   Mouth: second molar right sided tooth appears to have cavitary lesion    CHEST/LUNG: Clear to auscultation bilaterally; No rales, rhonchi, wheezing, or rubs. Unlabored respirations, on room air   HEART: Regular rate and rhythm; No murmurs, rubs, or gallops  ABDOMEN: Bowel sounds present; Soft, Nontender, Nondistended.   EXTREMITIES:  2+ Peripheral Pulses intact b/l, no LE edema   NERVOUS SYSTEM:  Alert & Oriented X3, speech clear. CN I-XII intact. LE 2/5 strength b/l (improved from yesterday) . Sensory intact in all extremities. UE 5/5 strength b/l        LABS:                        12.3   3.90  )-----------( 279      ( 09 Jun 2021 07:30 )             38.6     Auto Eosinophil # x     / Auto Eosinophil % x     / Auto Neutrophil # x     / Auto Neutrophil % x     / BANDS % x                            12.2   4.33  )-----------( 278      ( 08 Jun 2021 07:36 )             38.2     Auto Eosinophil # x     / Auto Eosinophil % x     / Auto Neutrophil # x     / Auto Neutrophil % x     / BANDS % x        06-09    136  |  103  |  13  ----------------------------<  88  3.9   |  20<L>  |  0.99  06-08    138  |  104  |  10  ----------------------------<  95  3.6   |  21<L>  |  0.91    Ca    9.3      09 Jun 2021 07:30  Mg     1.6     06-09  Phos  4.3     06-09

## 2021-06-11 ENCOUNTER — TRANSCRIPTION ENCOUNTER (OUTPATIENT)
Age: 31
End: 2021-06-11

## 2021-06-11 PROCEDURE — 99232 SBSQ HOSP IP/OBS MODERATE 35: CPT | Mod: GC

## 2021-06-11 RX ORDER — LANOLIN ALCOHOL/MO/W.PET/CERES
1 CREAM (GRAM) TOPICAL
Qty: 0 | Refills: 0 | DISCHARGE
Start: 2021-06-11

## 2021-06-11 RX ADMIN — GABAPENTIN 300 MILLIGRAM(S): 400 CAPSULE ORAL at 06:58

## 2021-06-11 RX ADMIN — QUETIAPINE FUMARATE 200 MILLIGRAM(S): 200 TABLET, FILM COATED ORAL at 21:40

## 2021-06-11 RX ADMIN — GABAPENTIN 300 MILLIGRAM(S): 400 CAPSULE ORAL at 21:40

## 2021-06-11 RX ADMIN — Medication 3 MILLIGRAM(S): at 21:40

## 2021-06-11 RX ADMIN — Medication 10 MILLIGRAM(S): at 17:40

## 2021-06-11 RX ADMIN — BICTEGRAVIR SODIUM, EMTRICITABINE, AND TENOFOVIR ALAFENAMIDE FUMARATE 1 TABLET(S): 30; 120; 15 TABLET ORAL at 13:58

## 2021-06-11 RX ADMIN — GABAPENTIN 300 MILLIGRAM(S): 400 CAPSULE ORAL at 13:58

## 2021-06-11 RX ADMIN — Medication 10 MILLIGRAM(S): at 06:58

## 2021-06-11 NOTE — PROGRESS NOTE ADULT - PROBLEM SELECTOR PLAN 4
Right tooth pain (possible second molar cavity)  - dental consult placed  - s/p tooth extraction  - Morphine 2 mg PRN for pain

## 2021-06-11 NOTE — PROGRESS NOTE ADULT - SUBJECTIVE AND OBJECTIVE BOX
Laura Lau MD  PGY 1 Department of Internal Medicine  Pager: [phone] ([facility])       Patient is a 31y old  Male who presents with a chief complaint of Bilateral LE weakness (10 Mian 2021 16:41)      SUBJECTIVE / OVERNIGHT EVENTS: Pt seen and examined. No acute overnight events. s/p tooth extraction yesterday, received morphine PRN for pain, and one dose of ketorolac yesterday. Hepatitis panel and blood cx negative, labs stable.   Pending discharge to rehab today     MEDICATIONS  (STANDING):  baclofen 10 milliGRAM(s) Oral two times a day  bictegravir 50 mG/emtricitabine 200 mG/tenofovir alafenamide 25 mG (BIKTARVY) 1 Tablet(s) Oral daily  gabapentin 300 milliGRAM(s) Oral three times a day  melatonin 3 milliGRAM(s) Oral at bedtime  QUEtiapine 200 milliGRAM(s) Oral at bedtime    MEDICATIONS  (PRN):  morphine  - Injectable 2 milliGRAM(s) IV Push every 6 hours PRN Severe Pain (7 - 10)      I&O's Summary      Vital Signs Last 24 Hrs  T(C): 36.8 (11 Jun 2021 06:48), Max: 36.8 (11 Jun 2021 06:48)  T(F): 98.3 (11 Jun 2021 06:48), Max: 98.3 (11 Jun 2021 06:48)  HR: 65 (11 Jun 2021 06:48) (60 - 78)  BP: 142/85 (11 Jun 2021 06:48) (123/66 - 147/79)  BP(mean): --  RR: 18 (11 Jun 2021 06:48) (18 - 18)  SpO2: 98% (11 Jun 2021 06:48) (98% - 100%)    CAPILLARY BLOOD GLUCOSE          PHYSICAL EXAM:  GENERAL: NAD, lying in bed comfortably  HEAD:  Atraumatic, Normocephalic  EYES: constricted pupils b/l (Utox positive for cocaine)   ENT: Moist mucous membranes,   Mouth: s/p tooth extraction   CHEST/LUNG: Clear to auscultation bilaterally; No rales, rhonchi, wheezing, or rubs. Unlabored respirations, on room air   HEART: Regular rate and rhythm; No murmurs, rubs, or gallops  ABDOMEN: Bowel sounds present; Soft, Nontender, Nondistended.   EXTREMITIES:  2+ Peripheral Pulses intact b/l, no LE edema   NERVOUS SYSTEM:  Alert & Oriented X3, speech clear. CN I-XII intact. LE 2/5 strength b/l (improved from yesterday) . Sensory intact in all extremities. UE 5/5 strength b/l        LABS:

## 2021-06-11 NOTE — PROGRESS NOTE ADULT - PROBLEM SELECTOR PLAN 6
DVT prophylaxis: lovenox 40mg subQ  Diet: regular  Dispo: social work consult given homelessness, PT recommends rehab,  Discharge today to [facility]

## 2021-06-11 NOTE — PROGRESS NOTE ADULT - ATTENDING COMMENTS
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 Multiple hospital visits and previously eloped, now w/possible GBS, though with possible of malingering.     MR Lumbar spine - no evidence of large epidural abscess or central canal stenosis. Patient refused MRI of cervical, thoracic and head. As per neuro, no urgency.   Continue HIV treatment as per ID, will require close follow up as outpatient  s/p dental work   Optimized for discharge

## 2021-06-11 NOTE — DISCHARGE NOTE NURSING/CASE MANAGEMENT/SOCIAL WORK - NSDCPETBCESMAN_GEN_ALL_CORE
If you are a smoker, it is important for your health to stop smoking. Please be aware that second hand smoke is also harmful. Yes

## 2021-06-11 NOTE — PROGRESS NOTE ADULT - ASSESSMENT
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 Thus far work up has been negative, refused MRI, LE weakness improving   Cleared for discharge to rehab

## 2021-06-11 NOTE — CHART NOTE - NSCHARTNOTEFT_GEN_A_CORE
Informed by nurse that patient stated that he did not want to go to rehab facility that he initially chose. Informed nurse that patient chose rehab and discharge must proceed. Patient initially agreed to be discharged but then changed his mind and refused again to go to rehab. Discussed with Nurse Manager Dexter; will reach out to social work in next days to discuss disposition.

## 2021-06-11 NOTE — PROGRESS NOTE ADULT - PROBLEM SELECTOR PLAN 3
-patient noncompliant on medications d/t lack of access (when asked, patient states he "does not know where to get them").   -HIV viral load 7,525   - CD4 count pending   -ID recs appreciated  - resumed Biktarvy per ID recs   - f/u blood cultures (patient not septic on presentation, reported fevers at home)- NG  - hepatitis panel:  negative   - gonorrhea/chlamydia negative  - Quant gold negative   - will follow with HIV clinic at [facility] outpatient

## 2021-06-11 NOTE — DISCHARGE NOTE NURSING/CASE MANAGEMENT/SOCIAL WORK - PATIENT PORTAL LINK FT
You can access the FollowMyHealth Patient Portal offered by [facility] by registering at the following website: [email or web address]. By joining [employer]’s FollowMyHealth portal, you will also be able to view your health information using other applications (apps) compatible with our system.

## 2021-06-12 PROCEDURE — 99232 SBSQ HOSP IP/OBS MODERATE 35: CPT | Mod: GC

## 2021-06-12 RX ORDER — ACETAMINOPHEN 500 MG
650 TABLET ORAL ONCE
Refills: 0 | Status: COMPLETED | OUTPATIENT
Start: 2021-06-12 | End: 2021-06-12

## 2021-06-12 RX ADMIN — BICTEGRAVIR SODIUM, EMTRICITABINE, AND TENOFOVIR ALAFENAMIDE FUMARATE 1 TABLET(S): 30; 120; 15 TABLET ORAL at 13:55

## 2021-06-12 RX ADMIN — Medication 3 MILLIGRAM(S): at 21:56

## 2021-06-12 RX ADMIN — Medication 10 MILLIGRAM(S): at 17:11

## 2021-06-12 RX ADMIN — Medication 650 MILLIGRAM(S): at 17:11

## 2021-06-12 RX ADMIN — QUETIAPINE FUMARATE 200 MILLIGRAM(S): 200 TABLET, FILM COATED ORAL at 21:56

## 2021-06-12 RX ADMIN — GABAPENTIN 300 MILLIGRAM(S): 400 CAPSULE ORAL at 13:55

## 2021-06-12 RX ADMIN — Medication 650 MILLIGRAM(S): at 17:45

## 2021-06-12 RX ADMIN — Medication 10 MILLIGRAM(S): at 05:59

## 2021-06-12 RX ADMIN — GABAPENTIN 300 MILLIGRAM(S): 400 CAPSULE ORAL at 21:56

## 2021-06-12 RX ADMIN — GABAPENTIN 300 MILLIGRAM(S): 400 CAPSULE ORAL at 05:59

## 2021-06-12 NOTE — PROGRESS NOTE ADULT - PROBLEM SELECTOR PLAN 3
-patient noncompliant on medications d/t lack of access (when asked, patient states he "does not know where to get them").   -HIV viral load 7,525   - CD4 count pending   -ID recs appreciated  - resumed Biktarvy per ID recs   - f/u blood cultures (patient not septic on presentation, reported fevers at home)- NG  - hepatitis panel:  negative   - gonorrhea/chlamydia negative  - Quant gold negative   - will [place] [facility] [place]

## 2021-06-12 NOTE — PROGRESS NOTE ADULT - SUBJECTIVE AND OBJECTIVE BOX
NELSON MITCHELL  31y  Male      Patient is a 31y old  Male who presents with a chief complaint of Bilateral LE weakness (11 Jun 2021 07:43)      INTERVAL HPI/OVERNIGHT EVENTS:          PHYSICAL EXAM:      Consultant(s) Notes Reviewed:  [x ] YES  [ ] NO  Care Discussed with Consultants/Other Providers [ x] YES  [ ] NO    LABS:      RADIOLOGY & ADDITIONAL TESTS:    Imaging Personally Reviewed:  [ ] YES  [ ] NO     PAULA NELSON  31y  Male      Patient is a 31y old  Male who presents with a chief complaint of Bilateral LE weakness (11 Jun 2021 07:43)    INTERVAL HPI/OVERNIGHT EVENTS: No acute issues. Patient refused d/c to rehab yesterday. Today asking to be trasferred to nursing home of his choice. Also complaining of dental pain.    Vital Signs Last 24 Hrs  T(C): 36.8 (12 Jun 2021 12:35), Max: 36.8 (11 Jun 2021 22:05)  T(F): 98.2 (12 Jun 2021 12:35), Max: 98.2 (11 Jun 2021 22:05)  HR: 74 (12 Jun 2021 12:35) (70 - 83)  BP: 114/60 (12 Jun 2021 12:35) (114/60 - 143/88)  BP(mean): --  RR: 17 (12 Jun 2021 12:35) (17 - 18)  SpO2: 100% (12 Jun 2021 12:35) (98% - 100%)      PHYSICAL EXAM:  GENERAL: NAD, well-developed, disheveled  HEAD:  Atraumatic, Normocephalic  EYES: EOMI, conjunctiva and sclera clear  NECK: Supple, No JVD  CHEST/LUNG: Clear to auscultation bilaterally; No wheeze, ronchi or rales  HEART: Regular rate and rhythm; No murmurs, rubs, or gallops  ABDOMEN: Soft, Nontender, Nondistended; Bowel sounds present  EXTREMITIES:  2+ Peripheral Pulses, No clubbing, cyanosis, or edema  NEUROLOGY: non-focal  SKIN: No rashes or lesions    Consultant(s) Notes Reviewed:  [x ] YES  [ ] NO  Care Discussed with Consultants/Other Providers [ x] YES  [ ] NO    RADIOLOGY & ADDITIONAL TESTS:    Imaging Personally Reviewed:  [ ] YES  [ ] NO

## 2021-06-12 NOTE — PROGRESS NOTE ADULT - PROBLEM SELECTOR PLAN 6
DVT prophylaxis: lovenox 40mg subQ  Diet: regular  Dispo: social work consult given homelessness, PT recommends rehab,  Discharge to nursing home per patient's preference

## 2021-06-12 NOTE — PROGRESS NOTE ADULT - ATTENDING COMMENTS
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 Multiple hospital visits and previously eloped, now w/possible GBS, though with possible of malingering.     MR Lumbar spine - no evidence of large epidural abscess or central canal stenosis. Patient refused MRI of cervical, thoracic and head. As per neuro, no urgency. Continue HIV treatment as per ID, will require close follow up as outpatient  s/p dental work   Optimized for discharge, SW involvement for DC planning to [facility].

## 2021-06-13 PROCEDURE — 99231 SBSQ HOSP IP/OBS SF/LOW 25: CPT | Mod: GC

## 2021-06-13 RX ORDER — ACETAMINOPHEN 500 MG
975 TABLET ORAL EVERY 6 HOURS
Refills: 0 | Status: DISCONTINUED | OUTPATIENT
Start: 2021-06-13 | End: 2021-06-16

## 2021-06-13 RX ADMIN — Medication 3 MILLIGRAM(S): at 21:54

## 2021-06-13 RX ADMIN — GABAPENTIN 300 MILLIGRAM(S): 400 CAPSULE ORAL at 06:02

## 2021-06-13 RX ADMIN — GABAPENTIN 300 MILLIGRAM(S): 400 CAPSULE ORAL at 21:54

## 2021-06-13 RX ADMIN — Medication 10 MILLIGRAM(S): at 06:02

## 2021-06-13 RX ADMIN — GABAPENTIN 300 MILLIGRAM(S): 400 CAPSULE ORAL at 13:49

## 2021-06-13 RX ADMIN — Medication 10 MILLIGRAM(S): at 17:52

## 2021-06-13 RX ADMIN — BICTEGRAVIR SODIUM, EMTRICITABINE, AND TENOFOVIR ALAFENAMIDE FUMARATE 1 TABLET(S): 30; 120; 15 TABLET ORAL at 13:49

## 2021-06-13 RX ADMIN — QUETIAPINE FUMARATE 200 MILLIGRAM(S): 200 TABLET, FILM COATED ORAL at 21:54

## 2021-06-13 NOTE — PROGRESS NOTE ADULT - PROBLEM SELECTOR PLAN 3
-patient noncompliant on medications d/t lack of access (when asked, patient states he "does not know where to get them").   -HIV viral load 7,525   - CD4 count pending   -ID recs appreciated  - resumed Biktarvy per ID recs   - f/u blood cultures (patient not septic on presentation, reported fevers at home)- NG  - hepatitis panel:  negative   - gonorrhea/chlamydia negative  - Quant gold negative   - will follow with HIV clinic at [facility] outpatient

## 2021-06-13 NOTE — PROGRESS NOTE ADULT - PROBLEM SELECTOR PLAN 1
-neurology onboard, appreciate recommendations  -MRI L/S spine w/wo contrast limited by motion artifact, benign (negative for OM, abscess, or spinal stenosis)  -f/u MR brain, cervical and thoracic spine  -f/u screen for HSV,1/2, VZV (+former infection), CMV (negative), WNV and syphilis (negative), lyme (negative), B12 (wnl), folate (wnl).     -PT consult- recs rehab  - Patient refused MRI 6/9/21  - LE weakness improving  - cleared from neuro perspective for discharge  - patient pending placement at [facility] for HIV

## 2021-06-13 NOTE — PROGRESS NOTE ADULT - ATTENDING COMMENTS
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 Multiple hospital visits and previously eloped, now w/possible GBS, though with possible of malingering.     MR Lumbar spine - no evidence of large epidural abscess or central canal stenosis. Patient refused MRI of cervical, thoracic and head. As per neuro, no urgency. Continue HIV treatment as per ID, will require close follow up as outpatient. s/p dental work. Medically optimized for discharge, SW involvement for DC planning to [facility]- refused placement on Friday. f/u SW for new placement on Monday.

## 2021-06-13 NOTE — PROGRESS NOTE ADULT - PROBLEM SELECTOR PLAN 7
Social work consult, history of homelessness, patient not homeless right now. Lack of access to medications.   -UTox positive for cocaine and THC, c/w seroquel 200 mg bedtime      Dispo: pending response from referral to [facility] for HIV and nursing for rehab

## 2021-06-13 NOTE — PROGRESS NOTE ADULT - SUBJECTIVE AND OBJECTIVE BOX
Laura Lau MD  PGY 1 Department of Internal Medicine  Pager: [phone] ([facility])       Patient is a 31y old  Male who presents with a chief complaint of Bilateral LE weakness (12 Jun 2021 10:48)      SUBJECTIVE / OVERNIGHT EVENTS: Pt seen and examined. No acute overnight events. Patient was medically cleared for discharge to rehab on Friday, 6/13. Subsequently patient refused to leave stating that he would like to go to a different rehab facility called [facility] for HIV and nursing in the [place]. A referral was sent to this facility by SW, still awaiting a response.         MEDICATIONS  (STANDING):  baclofen 10 milliGRAM(s) Oral two times a day  bictegravir 50 mG/emtricitabine 200 mG/tenofovir alafenamide 25 mG (BIKTARVY) 1 Tablet(s) Oral daily  gabapentin 300 milliGRAM(s) Oral three times a day  melatonin 3 milliGRAM(s) Oral at bedtime  QUEtiapine 200 milliGRAM(s) Oral at bedtime    MEDICATIONS  (PRN):  morphine  - Injectable 2 milliGRAM(s) IV Push every 6 hours PRN Severe Pain (7 - 10)      I&O's Summary      Vital Signs Last 24 Hrs  T(C): 36.7 (13 Jun 2021 06:00), Max: 36.8 (12 Jun 2021 12:35)  T(F): 98 (13 Jun 2021 06:00), Max: 98.2 (12 Jun 2021 12:35)  HR: 75 (13 Jun 2021 06:00) (74 - 84)  BP: 114/71 (13 Jun 2021 06:00) (114/60 - 131/77)  BP(mean): --  RR: 17 (13 Jun 2021 06:00) (17 - 17)  SpO2: 100% (13 Jun 2021 06:00) (100% - 100%)    CAPILLARY BLOOD GLUCOSE      PHYSICAL EXAM:  GENERAL: NAD, lying in bed comfortably  HEAD:  Atraumatic, Normocephalic  EYES: constricted pupils b/l (Utox positive for cocaine)   ENT: Moist mucous membranes,   Mouth: s/p tooth extraction   CHEST/LUNG: Clear to auscultation bilaterally; No rales, rhonchi, wheezing, or rubs. Unlabored respirations, on room air   HEART: Regular rate and rhythm; No murmurs, rubs, or gallops  ABDOMEN: Bowel sounds present; Soft, Nontender, Nondistended.   EXTREMITIES:  2+ Peripheral Pulses intact b/l, no LE edema   NERVOUS SYSTEM:  Alert & Oriented X3, speech clear. CN I-XII intact. LE 2/5 strength b/l (improved from yesterday) . Sensory intact in all extremities. UE 5/5 strength b/l     LABS:

## 2021-06-13 NOTE — PROGRESS NOTE ADULT - ASSESSMENT
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 Thus far work up has been negative, refused MRI, LE weakness improving   Cleared for discharge to rehab however refused to leave until referral sent to [facility] in [place]

## 2021-06-14 LAB
ANION GAP SERPL CALC-SCNC: 15 MMOL/L — HIGH (ref 7–14)
BUN SERPL-MCNC: 19 MG/DL — SIGNIFICANT CHANGE UP (ref 7–23)
CALCIUM SERPL-MCNC: 9.8 MG/DL — SIGNIFICANT CHANGE UP (ref 8.4–10.5)
CHLORIDE SERPL-SCNC: 100 MMOL/L — SIGNIFICANT CHANGE UP (ref 98–107)
CO2 SERPL-SCNC: 21 MMOL/L — LOW (ref 22–31)
CREAT SERPL-MCNC: 1.03 MG/DL — SIGNIFICANT CHANGE UP (ref 0.5–1.3)
GLUCOSE SERPL-MCNC: 81 MG/DL — SIGNIFICANT CHANGE UP (ref 70–99)
HCT VFR BLD CALC: 37.9 % — LOW (ref 39–50)
HGB BLD-MCNC: 12 G/DL — LOW (ref 13–17)
MAGNESIUM SERPL-MCNC: 1.4 MG/DL — LOW (ref 1.6–2.6)
MCHC RBC-ENTMCNC: 29.1 PG — SIGNIFICANT CHANGE UP (ref 27–34)
MCHC RBC-ENTMCNC: 31.7 GM/DL — LOW (ref 32–36)
MCV RBC AUTO: 92 FL — SIGNIFICANT CHANGE UP (ref 80–100)
NRBC # BLD: 0 /100 WBCS — SIGNIFICANT CHANGE UP
NRBC # FLD: 0 K/UL — SIGNIFICANT CHANGE UP
PHOSPHATE SERPL-MCNC: 4.4 MG/DL — SIGNIFICANT CHANGE UP (ref 2.5–4.5)
PLATELET # BLD AUTO: 280 K/UL — SIGNIFICANT CHANGE UP (ref 150–400)
POTASSIUM SERPL-MCNC: 3.9 MMOL/L — SIGNIFICANT CHANGE UP (ref 3.5–5.3)
POTASSIUM SERPL-SCNC: 3.9 MMOL/L — SIGNIFICANT CHANGE UP (ref 3.5–5.3)
RBC # BLD: 4.12 M/UL — LOW (ref 4.2–5.8)
RBC # FLD: 12.9 % — SIGNIFICANT CHANGE UP (ref 10.3–14.5)
SARS-COV-2 RNA SPEC QL NAA+PROBE: SIGNIFICANT CHANGE UP
SODIUM SERPL-SCNC: 136 MMOL/L — SIGNIFICANT CHANGE UP (ref 135–145)
WBC # BLD: 4.81 K/UL — SIGNIFICANT CHANGE UP (ref 3.8–10.5)
WBC # FLD AUTO: 4.81 K/UL — SIGNIFICANT CHANGE UP (ref 3.8–10.5)

## 2021-06-14 PROCEDURE — 99232 SBSQ HOSP IP/OBS MODERATE 35: CPT | Mod: GC

## 2021-06-14 RX ORDER — IBUPROFEN 200 MG
400 TABLET ORAL EVERY 8 HOURS
Refills: 0 | Status: DISCONTINUED | OUTPATIENT
Start: 2021-06-14 | End: 2021-06-16

## 2021-06-14 RX ORDER — MAGNESIUM SULFATE 500 MG/ML
2 VIAL (ML) INJECTION ONCE
Refills: 0 | Status: DISCONTINUED | OUTPATIENT
Start: 2021-06-14 | End: 2021-06-14

## 2021-06-14 RX ORDER — MAGNESIUM OXIDE 400 MG ORAL TABLET 241.3 MG
400 TABLET ORAL ONCE
Refills: 0 | Status: COMPLETED | OUTPATIENT
Start: 2021-06-14 | End: 2021-06-14

## 2021-06-14 RX ORDER — IBUPROFEN 200 MG
400 TABLET ORAL EVERY 8 HOURS
Refills: 0 | Status: DISCONTINUED | OUTPATIENT
Start: 2021-06-14 | End: 2021-06-14

## 2021-06-14 RX ADMIN — Medication 400 MILLIGRAM(S): at 09:42

## 2021-06-14 RX ADMIN — Medication 400 MILLIGRAM(S): at 10:42

## 2021-06-14 RX ADMIN — MAGNESIUM OXIDE 400 MG ORAL TABLET 400 MILLIGRAM(S): 241.3 TABLET ORAL at 09:41

## 2021-06-14 RX ADMIN — BICTEGRAVIR SODIUM, EMTRICITABINE, AND TENOFOVIR ALAFENAMIDE FUMARATE 1 TABLET(S): 30; 120; 15 TABLET ORAL at 13:30

## 2021-06-14 RX ADMIN — GABAPENTIN 300 MILLIGRAM(S): 400 CAPSULE ORAL at 13:29

## 2021-06-14 RX ADMIN — Medication 1 TABLET(S): at 17:35

## 2021-06-14 RX ADMIN — Medication 400 MILLIGRAM(S): at 22:35

## 2021-06-14 RX ADMIN — Medication 10 MILLIGRAM(S): at 17:35

## 2021-06-14 RX ADMIN — QUETIAPINE FUMARATE 200 MILLIGRAM(S): 200 TABLET, FILM COATED ORAL at 22:35

## 2021-06-14 RX ADMIN — GABAPENTIN 300 MILLIGRAM(S): 400 CAPSULE ORAL at 22:35

## 2021-06-14 RX ADMIN — GABAPENTIN 300 MILLIGRAM(S): 400 CAPSULE ORAL at 06:00

## 2021-06-14 RX ADMIN — Medication 400 MILLIGRAM(S): at 23:35

## 2021-06-14 RX ADMIN — Medication 10 MILLIGRAM(S): at 06:00

## 2021-06-14 NOTE — PROGRESS NOTE ADULT - SUBJECTIVE AND OBJECTIVE BOX
Laura Lau MD  PGY 1 Department of Internal Medicine  Pager: [phone] ([facility])       Patient is a 31y old  Male who presents with a chief complaint of Bilateral LE weakness (13 Jun 2021 07:08)      SUBJECTIVE / OVERNIGHT EVENTS: Pt seen and examined. No acute overnight events. Pending placement to rehab of patient's choice in [place], [place]         MEDICATIONS  (STANDING):  baclofen 10 milliGRAM(s) Oral two times a day  bictegravir 50 mG/emtricitabine 200 mG/tenofovir alafenamide 25 mG (BIKTARVY) 1 Tablet(s) Oral daily  gabapentin 300 milliGRAM(s) Oral three times a day  melatonin 3 milliGRAM(s) Oral at bedtime  QUEtiapine 200 milliGRAM(s) Oral at bedtime    MEDICATIONS  (PRN):  acetaminophen   Tablet .. 975 milliGRAM(s) Oral every 6 hours PRN Mild Pain (1 - 3), Moderate Pain (4 - 6), Severe Pain (7 - 10)  morphine  - Injectable 2 milliGRAM(s) IV Push every 6 hours PRN Severe Pain (7 - 10)      I&O's Summary      Vital Signs Last 24 Hrs  T(C): 36.7 (14 Jun 2021 05:59), Max: 36.9 (13 Jun 2021 21:15)  T(F): 98 (14 Jun 2021 05:59), Max: 98.4 (13 Jun 2021 21:15)  HR: 74 (14 Jun 2021 05:59) (74 - 85)  BP: 122/70 (14 Jun 2021 05:59) (122/70 - 130/85)  BP(mean): --  RR: 17 (14 Jun 2021 05:59) (17 - 17)  SpO2: 100% (14 Jun 2021 05:59) (100% - 100%)    CAPILLARY BLOOD GLUCOSE      PHYSICAL EXAM:  GENERAL: NAD, lying in bed comfortably  HEAD:  Atraumatic, Normocephalic  EYES: constricted pupils b/l (Utox positive for cocaine)   ENT: Moist mucous membranes,   Mouth: s/p tooth extraction   CHEST/LUNG: Clear to auscultation bilaterally; No rales, rhonchi, wheezing, or rubs. Unlabored respirations, on room air   HEART: Regular rate and rhythm; No murmurs, rubs, or gallops  ABDOMEN: Bowel sounds present; Soft, Nontender, Nondistended.   EXTREMITIES:  2+ Peripheral Pulses intact b/l, no LE edema   NERVOUS SYSTEM:  Alert & Oriented X3, speech clear. CN I-XII intact. LE 2/5 strength b/l (improved from yesterday) . Sensory intact in all extremities. UE 5/5 strength b/l        LABS:                        12.0   4.81  )-----------( 280      ( 14 Jun 2021 06:46 )             37.9     Auto Eosinophil # x     / Auto Eosinophil % x     / Auto Neutrophil # x     / Auto Neutrophil % x     / BANDS % x         Laura Lau MD  PGY 1 Department of Internal Medicine  Pager: [phone] ([facility])       Patient is a 31y old  Male who presents with a chief complaint of Bilateral LE weakness (13 Jun 2021 07:08)      SUBJECTIVE / OVERNIGHT EVENTS: Pt seen and examined. No acute overnight events. Pending placement to rehab of patient's choice in [place], [place]   Complaining of right sided tooth pain and swelling, this AM         MEDICATIONS  (STANDING):  baclofen 10 milliGRAM(s) Oral two times a day  bictegravir 50 mG/emtricitabine 200 mG/tenofovir alafenamide 25 mG (BIKTARVY) 1 Tablet(s) Oral daily  gabapentin 300 milliGRAM(s) Oral three times a day  melatonin 3 milliGRAM(s) Oral at bedtime  QUEtiapine 200 milliGRAM(s) Oral at bedtime    MEDICATIONS  (PRN):  acetaminophen   Tablet .. 975 milliGRAM(s) Oral every 6 hours PRN Mild Pain (1 - 3), Moderate Pain (4 - 6), Severe Pain (7 - 10)  morphine  - Injectable 2 milliGRAM(s) IV Push every 6 hours PRN Severe Pain (7 - 10)      I&O's Summary      Vital Signs Last 24 Hrs  T(C): 36.7 (14 Jun 2021 05:59), Max: 36.9 (13 Jun 2021 21:15)  T(F): 98 (14 Jun 2021 05:59), Max: 98.4 (13 Jun 2021 21:15)  HR: 74 (14 Jun 2021 05:59) (74 - 85)  BP: 122/70 (14 Jun 2021 05:59) (122/70 - 130/85)  BP(mean): --  RR: 17 (14 Jun 2021 05:59) (17 - 17)  SpO2: 100% (14 Jun 2021 05:59) (100% - 100%)    CAPILLARY BLOOD GLUCOSE      PHYSICAL EXAM:  GENERAL: NAD, lying in bed comfortably  HEAD:  Atraumatic, Normocephalic  EYES: constricted pupils b/l (Utox positive for cocaine)   ENT: Moist mucous membranes,   Mouth: Rt sided mandible pain on palpation  CHEST/LUNG: Clear to auscultation bilaterally; No rales, rhonchi, wheezing, or rubs. Unlabored respirations, on room air   HEART: Regular rate and rhythm; No murmurs, rubs, or gallops  ABDOMEN: Bowel sounds present; Soft, Nontender, Nondistended.   EXTREMITIES:  2+ Peripheral Pulses intact b/l, no LE edema   NERVOUS SYSTEM:  Alert & Oriented X3, speech clear. CN I-XII intact. LE 2/5 strength b/l (improved from yesterday) . Sensory intact in all extremities. UE 5/5 strength b/l        LABS:                        12.0   4.81  )-----------( 280      ( 14 Jun 2021 06:46 )             37.9     Auto Eosinophil # x     / Auto Eosinophil % x     / Auto Neutrophil # x     / Auto Neutrophil % x     / BANDS % x

## 2021-06-14 NOTE — PROGRESS NOTE ADULT - ASSESSMENT
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 Thus far work up has been negative, refused MRI, LE weakness improving   Cleared for discharge to rehab however refused to leave until referral sent to [facility] in [place]

## 2021-06-14 NOTE — DIETITIAN INITIAL EVALUATION ADULT. - PROBLEM SELECTOR PLAN 1
-neurology onboard, appreciate recommendations  -MRI L/S spine w/wo contrast limited by motion artifact, benign (negative for OM, abscess)  -f/u MR brain, cervical and thoracic spine, repeat lumbar spine 2/2 initial imaging limitations?  -f/u screen for HSV,1/2, VZV, CMV, WNV and syphilis, lyme. B12, folate.   -PT consult

## 2021-06-14 NOTE — PROGRESS NOTE ADULT - ATTENDING COMMENTS
Patient seen and examined. Case discussed with the medical team on rounds. I agree with the findings and the plan above.    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 Multiple hospital visits and previously eloped, now w/possible GBS, though with possible of malingering.     MR Lumbar spine - no evidence of large epidural abscess or central canal stenosis. Patient refused MRI of cervical, thoracic and head. As per neuro, no urgency.   Continue HIV treatment as per ID, will require close follow up as outpatient  s/p dental work   Optimized for discharge, though declined specific rehab upon discharge on Friday.   Continue the rest of the work up and management as stated above.

## 2021-06-14 NOTE — PROGRESS NOTE ADULT - PROBLEM SELECTOR PLAN 4
Right tooth pain (possible second molar cavity)  - dental consult placed  - s/p tooth extraction  - Morphine 2 mg PRN for pain, tylenol PRN for mild pain Right tooth pain (possible second molar cavity)  - dental consult placed  - s/p tooth extraction  - Morphine 2 mg PRN for pain, tylenol PRN for mild pain  - call dental again

## 2021-06-14 NOTE — PROGRESS NOTE ADULT - SUBJECTIVE AND OBJECTIVE BOX
Please refer to previous dental consult note for additional information.     INTERVAL HPI/OVERNIGHT EVENTS: Patient was last seen by dental on 06/10/2021, during which incision and drainage of LR posterior was performed due to slight EO/IO swelling. No drain was placed at that time.  At today's encounter, patient stated he has not received any antibiotics. Med team was informed of recommendation for antibiotics to address dental-related LR facial swelling.    MEDICATIONS  (STANDING):  amoxicillin  875 milliGRAM(s)/clavulanate 1 Tablet(s) Oral every 12 hours  baclofen 10 milliGRAM(s) Oral two times a day  bictegravir 50 mG/emtricitabine 200 mG/tenofovir alafenamide 25 mG (BIKTARVY) 1 Tablet(s) Oral daily  gabapentin 300 milliGRAM(s) Oral three times a day  melatonin 3 milliGRAM(s) Oral at bedtime  QUEtiapine 200 milliGRAM(s) Oral at bedtime    MEDICATIONS  (PRN):  acetaminophen   Tablet .. 975 milliGRAM(s) Oral every 6 hours PRN Mild Pain (1 - 3), Moderate Pain (4 - 6), Severe Pain (7 - 10)  ibuprofen  Tablet. 400 milliGRAM(s) Oral every 8 hours PRN Mild Pain (1 - 3), Moderate Pain (4 - 6)  morphine  - Injectable 2 milliGRAM(s) IV Push every 6 hours PRN Severe Pain (7 - 10)      Allergies    Ceclor (Unknown)  Toradol (Anaphylaxis; Swelling)  Toradol (Swelling)    Intolerances        Vital Signs Last 24 Hrs  T(C): 36.7 (14 Jun 2021 12:36), Max: 36.9 (13 Jun 2021 21:15)  T(F): 98.1 (14 Jun 2021 12:36), Max: 98.4 (13 Jun 2021 21:15)  HR: 103 (14 Jun 2021 12:36) (74 - 103)  BP: 130/61 (14 Jun 2021 12:36) (122/70 - 130/85)  BP(mean): --  RR: 20 (14 Jun 2021 12:36) (17 - 20)  SpO2: 100% (14 Jun 2021 12:36) (100% - 100%)    LABS:                        12.0   4.81  )-----------( 280      ( 14 Jun 2021 06:46 )             37.9     06-14    136  |  100  |  19  ----------------------------<  81  3.9   |  21<L>  |  1.03    Ca    9.8      14 Jun 2021 06:46  Phos  4.4     06-14  Mg     1.4     06-14      Platelet Count - Automated: 280 K/uL [150 - 400] (06-14 @ 06:46)  WBC Count: 4.81 K/uL [3.80 - 10.50] (06-14 @ 06:46)    CLINICAL EXAM: Patient has slight LR extraoral swelling associated with multiple posterior LR carious teeth. (+) asymmetry due to LR swelling, (+) swelling of LR buccal region, (+) palpation - by LR extraoral swelling. Intraorally, slight diffuse edema of buccal gingiva - no intraoral purulence, fluctuance/abscess identified - no abscess to drain at this time.    DENTAL RADIOGRAPHS: No radiographs taken at this time.    RADIOLOGY & ADDITIONAL TESTS: No radiographs taken at this time. Intraoral radiographs taken at last visit indicate presence of #31 root tips and carious teeth #s 29,30 for which RCT or extraction are advised.      ASSESSMENT: Patient had originally refused extractions on 06/10/2021.   PLAN: Patient is agreeable to extractions at today's discussion; would like to return to dental clinic for procedure.    PROCEDURE:  Limited bedside exam performed.    RECOMMENDATIONS:  1) Recommend antibiotics and pain meds as per med team.  2) Plan: Extractions (#29,30,31) in [facility] dental clinic.  3) F/U with outpatient dentist for comprehensive dental care upon discharge.  4) If swelling, fever, difficulty breathing/swallowing occurs, page Dental.     Em Rodney DDS #[id number]

## 2021-06-14 NOTE — PROGRESS NOTE ADULT - PROBLEM SELECTOR PLAN 3
-patient noncompliant on medications d/t lack of access (when asked, patient states he "does not know where to get them").   -HIV viral load 7,525   - CD4 count pending   -ID recs appreciated  - resumed Biktarvy per ID recs   - f/u blood cultures (patient not septic on presentation, reported fevers at home)- NG  - hepatitis panel:  negative   - gonorrhea/chlamydia negative  - Quant gold negative   - will follow with HIV clinic at [facility] outpatient

## 2021-06-14 NOTE — DIETITIAN INITIAL EVALUATION ADULT. - PERTINENT MEDS FT
MEDICATIONS  (STANDING):  amoxicillin  875 milliGRAM(s)/clavulanate 1 Tablet(s) Oral every 12 hours  baclofen 10 milliGRAM(s) Oral two times a day  bictegravir 50 mG/emtricitabine 200 mG/tenofovir alafenamide 25 mG (BIKTARVY) 1 Tablet(s) Oral daily  gabapentin 300 milliGRAM(s) Oral three times a day  melatonin 3 milliGRAM(s) Oral at bedtime  QUEtiapine 200 milliGRAM(s) Oral at bedtime    MEDICATIONS  (PRN):  acetaminophen   Tablet .. 975 milliGRAM(s) Oral every 6 hours PRN Mild Pain (1 - 3), Moderate Pain (4 - 6), Severe Pain (7 - 10)  ibuprofen  Tablet. 400 milliGRAM(s) Oral every 8 hours PRN Mild Pain (1 - 3), Moderate Pain (4 - 6)  morphine  - Injectable 2 milliGRAM(s) IV Push every 6 hours PRN Severe Pain (7 - 10)

## 2021-06-14 NOTE — DIETITIAN INITIAL EVALUATION ADULT. - OTHER INFO
Per chart: 31 year old man,  with congenital HIV, GBS, AFib not on AC and reported stroke who p/w acute worsening B/L LE weakness x 2 days a/w fever (102 fever yesterday) as well as new change in urinary sx, c/f GBS flare vs osteomyelitis vs epidural abscess. Thus far work up has been negative, refused MRI, LE weakness improving. Cleared for discharge to rehab however refused to leave until referral sent to [facility] in [place].    Patient reports good appetite prior to admission and current appetite remains good, tolerated diet despite c/o right sided tooth pain and swelling at this time, reports PO intake >75% at meals today. Patient denies chewing/swallowing difficulties at meals, declined diet texture/consistency modification at this time. Dental is following. Patient also declined oral nutrition supplement at this time. Denies GI distress (nausea/vomiting/diarrhea/constipation). NKFA confirmed with patient. No food preferences voiced at this time.     Current weight 82kg (6/5/21). Noted weight was 81.7kg (4/28/21). Wt stable over the past month.    Skin: No pressure injuries  Edema: none noted.

## 2021-06-14 NOTE — DIETITIAN INITIAL EVALUATION ADULT. - PROBLEM SELECTOR PLAN 3
-patient noncompliant on medications d/t lack of access (when asked, patient states he "does not know where to get them").   -f/u HIV viral load, CD4 count  -consider resuming HAART, concern for immune reconstitution inflammatory syndrome, will discuss (will follow up CD4 count, IRIS uncommon in individuals with CD4 >100).   - ID consulted, awaiting recommendations at least to establish continuity, ensure regimen is appropriate for patient.   -f/u blood cultures (patient not septic on presentation, reported fevers)

## 2021-06-14 NOTE — PROGRESS NOTE ADULT - PROBLEM SELECTOR PLAN 7
Social work consult, history of homelessness, patient not homeless right now. Lack of access to medications.   -UTox positive for cocaine and THC, c/w seroquel 200 mg bedtime      Dispo: pending response from referral to [facility] for HIV and nursing for rehab

## 2021-06-14 NOTE — DIETITIAN INITIAL EVALUATION ADULT. - ADD RECOMMEND
1. Continue current diet order, which remains appropriate at this time. 2. Monitor weights, labs, BM's, skin integrity, PO intake/tolerance. 3. May consider adding daily Multivitamin for micronutrient coverage. 4. Encourage po intake, assist with meals and menu selections, provide alternatives PRN.

## 2021-06-14 NOTE — CHART NOTE - NSCHARTNOTEFT_GEN_A_CORE
Patient is pending tooth extraction/dental procedure 6/15/21 or 6/16/21  Vitals: T 98.1 HR 74 /61 RR 17 SpO2 100 % RA   There are no special considerations for HIV infected patients regarding antibiotic therapy prior to dental procedures, no antibiotics required. However given patient's Rt tooth pain and swelling, patient started on PO Augmentin for 7 days.   RCRI risk score is 0, LE weakness improving on PE as well.   Patient is medically optimized for dental procedure and local anesthetics w/ or w/o epinephrine   Patient is stable for transport for procedure as well.       Laura Lau, PGY-1   Case discussed with attending physician Dr. Sultana.

## 2021-06-14 NOTE — PROGRESS NOTE ADULT - PROBLEM SELECTOR PLAN 1
-neurology onboard, appreciate recommendations  -MRI L/S spine w/wo contrast limited by motion artifact, benign (negative for OM, abscess, or spinal stenosis)  -f/u MR brain, cervical and thoracic spine  -f/u screen for HSV,1/2, VZV (+former infection), CMV (negative), WNV and syphilis (negative), lyme (negative), B12 (wnl), folate (wnl).     -PT consult- recs rehab  - Patient refused MRI 6/9/21  - LE weakness improving  - cleared from neuro perspective for discharge  - patient pending placement at [facility] for HIV

## 2021-06-15 PROCEDURE — 99232 SBSQ HOSP IP/OBS MODERATE 35: CPT | Mod: GC

## 2021-06-15 PROCEDURE — 93971 EXTREMITY STUDY: CPT | Mod: 26,LT

## 2021-06-15 RX ORDER — SOD,AMMONIUM,POTASSIUM LACTATE
1 CREAM (GRAM) TOPICAL EVERY 12 HOURS
Refills: 0 | Status: DISCONTINUED | OUTPATIENT
Start: 2021-06-15 | End: 2021-06-16

## 2021-06-15 RX ORDER — DIPHENHYDRAMINE HYDROCHLORIDE AND LIDOCAINE HYDROCHLORIDE AND ALUMINUM HYDROXIDE AND MAGNESIUM HYDRO
15 KIT EVERY 6 HOURS
Refills: 0 | Status: DISCONTINUED | OUTPATIENT
Start: 2021-06-15 | End: 2021-06-16

## 2021-06-15 RX ADMIN — Medication 1 APPLICATION(S): at 18:32

## 2021-06-15 RX ADMIN — Medication 3 MILLIGRAM(S): at 00:02

## 2021-06-15 RX ADMIN — BICTEGRAVIR SODIUM, EMTRICITABINE, AND TENOFOVIR ALAFENAMIDE FUMARATE 1 TABLET(S): 30; 120; 15 TABLET ORAL at 13:04

## 2021-06-15 RX ADMIN — Medication 10 MILLIGRAM(S): at 06:14

## 2021-06-15 RX ADMIN — Medication 975 MILLIGRAM(S): at 15:15

## 2021-06-15 RX ADMIN — Medication 975 MILLIGRAM(S): at 14:20

## 2021-06-15 RX ADMIN — Medication 1 TABLET(S): at 18:33

## 2021-06-15 RX ADMIN — Medication 10 MILLIGRAM(S): at 18:32

## 2021-06-15 RX ADMIN — GABAPENTIN 300 MILLIGRAM(S): 400 CAPSULE ORAL at 21:41

## 2021-06-15 RX ADMIN — GABAPENTIN 300 MILLIGRAM(S): 400 CAPSULE ORAL at 06:14

## 2021-06-15 RX ADMIN — GABAPENTIN 300 MILLIGRAM(S): 400 CAPSULE ORAL at 13:04

## 2021-06-15 RX ADMIN — Medication 3 MILLIGRAM(S): at 21:41

## 2021-06-15 RX ADMIN — Medication 1 TABLET(S): at 06:14

## 2021-06-15 RX ADMIN — QUETIAPINE FUMARATE 200 MILLIGRAM(S): 200 TABLET, FILM COATED ORAL at 21:41

## 2021-06-15 NOTE — PROGRESS NOTE ADULT - ATTENDING COMMENTS
Patient seen and examined. Case discussed with the medical team on rounds. I agree with the findings and the plan above.    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 Multiple hospital visits and previously eloped, now w/possible GBS, though with possible of malingering.     MR Lumbar spine - no evidence of large epidural abscess or central canal stenosis. Patient refused MRI of cervical, thoracic and head. As per neuro, no urgency.   Continue HIV treatment as per ID, will require close follow up as outpatient, D/W Dr. Hunter, infectious disease  s/p dental work, as per dental will require extraction. Dental also recommends Augmentin for two weeks.   Optimized for discharge, though declined specific rehab upon discharge on Friday. Will reattempt to discharge after dental work.   Continue the rest of the work up and management as stated above.

## 2021-06-15 NOTE — PROGRESS NOTE ADULT - PROBLEM SELECTOR PLAN 4
Right tooth pain (possible second molar cavity)  - dental consult placed  - s/p tooth I&D 6/9  - now pending  extraction 29,30,31 on 6/16/21  - Tylenol and ibuprofen PRN for pain   - augmentin for 14 d 6/14-

## 2021-06-15 NOTE — PROGRESS NOTE ADULT - SUBJECTIVE AND OBJECTIVE BOX
Laura Lau MD  PGY 1 Department of Internal Medicine  Pager: [phone] ([facility])       Patient is a 31y old  Male who presents with a chief complaint of Bilateral LE weakness (14 Jun 2021 17:15)      SUBJECTIVE / OVERNIGHT EVENTS: Pt seen and examined. No acute overnight events. Endorses Rt tooth pain  Pending tooth extraction Wed.         MEDICATIONS  (STANDING):  amoxicillin  875 milliGRAM(s)/clavulanate 1 Tablet(s) Oral every 12 hours  baclofen 10 milliGRAM(s) Oral two times a day  bictegravir 50 mG/emtricitabine 200 mG/tenofovir alafenamide 25 mG (BIKTARVY) 1 Tablet(s) Oral daily  gabapentin 300 milliGRAM(s) Oral three times a day  melatonin 3 milliGRAM(s) Oral at bedtime  QUEtiapine 200 milliGRAM(s) Oral at bedtime    MEDICATIONS  (PRN):  acetaminophen   Tablet .. 975 milliGRAM(s) Oral every 6 hours PRN Mild Pain (1 - 3), Moderate Pain (4 - 6), Severe Pain (7 - 10)  ibuprofen  Tablet. 400 milliGRAM(s) Oral every 8 hours PRN Mild Pain (1 - 3), Moderate Pain (4 - 6)      I&O's Summary      Vital Signs Last 24 Hrs  T(C): 36.6 (15 Mian 2021 06:12), Max: 36.7 (14 Jun 2021 12:36)  T(F): 97.9 (15 Mian 2021 06:12), Max: 98.1 (14 Jun 2021 12:36)  HR: 62 (15 Mian 2021 06:12) (62 - 103)  BP: 117/62 (15 Mian 2021 06:12) (117/62 - 135/86)  BP(mean): --  RR: 17 (15 Mian 2021 06:12) (17 - 20)  SpO2: 100% (15 Mian 2021 06:12) (100% - 100%)    CAPILLARY BLOOD GLUCOSE      PHYSICAL EXAM:  GENERAL: NAD, lying in bed comfortably  HEAD:  Atraumatic, Normocephalic  EYES: constricted pupils b/l (Utox positive for cocaine)   ENT: Moist mucous membranes,   Mouth: Rt sided mandible pain on palpation  CHEST/LUNG: Clear to auscultation bilaterally; No rales, rhonchi, wheezing, or rubs. Unlabored respirations, on room air   HEART: Regular rate and rhythm; No murmurs, rubs, or gallops  ABDOMEN: Bowel sounds present; Soft, Nontender, Nondistended.   EXTREMITIES:  2+ Peripheral Pulses intact b/l, no LE edema   NERVOUS SYSTEM:  Alert & Oriented X3, speech clear. CN I-XII intact. LE 2/5 strength b/l (improved from yesterday) . Sensory intact in all extremities. UE 5/5 strength b/l        LABS:                        12.0   4.81  )-----------( 280      ( 14 Jun 2021 06:46 )             37.9     Auto Eosinophil # x     / Auto Eosinophil % x     / Auto Neutrophil # x     / Auto Neutrophil % x     / BANDS % x        06-14    136  |  100  |  19  ----------------------------<  81  3.9   |  21<L>  |  1.03    Ca    9.8      14 Jun 2021 06:46  Mg     1.4     06-14  Phos  4.4     06-14

## 2021-06-15 NOTE — PROGRESS NOTE ADULT - ASSESSMENT
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 Thus far work up has been negative, refused MRI, LE weakness improving. However now pending tooth extraction 6/16  Cleared for discharge to rehab however refused to leave until referral sent to [facility] in [place]

## 2021-06-15 NOTE — PROGRESS NOTE ADULT - PROBLEM SELECTOR PLAN 7
Social work consult, history of homelessness, patient not homeless right now. Lack of access to medications.   -UTox positive for cocaine and THC, c/w seroquel 200 mg bedtime      Dispo: pending response from referral to [facility] for HIV and nursing for rehab  Patient was accepted then refused other rehab facility on 6/11

## 2021-06-16 VITALS
DIASTOLIC BLOOD PRESSURE: 94 MMHG | OXYGEN SATURATION: 99 % | SYSTOLIC BLOOD PRESSURE: 139 MMHG | HEART RATE: 87 BPM | RESPIRATION RATE: 16 BRPM | TEMPERATURE: 98 F

## 2021-06-16 DIAGNOSIS — R60.0 LOCALIZED EDEMA: ICD-10-CM

## 2021-06-16 PROCEDURE — 99232 SBSQ HOSP IP/OBS MODERATE 35: CPT

## 2021-06-16 PROCEDURE — 99239 HOSP IP/OBS DSCHRG MGMT >30: CPT | Mod: GC

## 2021-06-16 RX ORDER — DIPHENHYDRAMINE HYDROCHLORIDE AND LIDOCAINE HYDROCHLORIDE AND ALUMINUM HYDROXIDE AND MAGNESIUM HYDRO
1 KIT
Qty: 0 | Refills: 0 | DISCHARGE
Start: 2021-06-16

## 2021-06-16 RX ORDER — SOD,AMMONIUM,POTASSIUM LACTATE
1 CREAM (GRAM) TOPICAL
Qty: 0 | Refills: 0 | DISCHARGE
Start: 2021-06-16

## 2021-06-16 RX ADMIN — Medication 1 APPLICATION(S): at 17:03

## 2021-06-16 RX ADMIN — Medication 10 MILLIGRAM(S): at 17:03

## 2021-06-16 RX ADMIN — Medication 1 TABLET(S): at 17:03

## 2021-06-16 RX ADMIN — Medication 1 TABLET(S): at 06:06

## 2021-06-16 RX ADMIN — Medication 10 MILLIGRAM(S): at 06:07

## 2021-06-16 RX ADMIN — BICTEGRAVIR SODIUM, EMTRICITABINE, AND TENOFOVIR ALAFENAMIDE FUMARATE 1 TABLET(S): 30; 120; 15 TABLET ORAL at 13:45

## 2021-06-16 RX ADMIN — GABAPENTIN 300 MILLIGRAM(S): 400 CAPSULE ORAL at 13:45

## 2021-06-16 RX ADMIN — Medication 975 MILLIGRAM(S): at 16:26

## 2021-06-16 RX ADMIN — GABAPENTIN 300 MILLIGRAM(S): 400 CAPSULE ORAL at 06:06

## 2021-06-16 RX ADMIN — Medication 1 APPLICATION(S): at 06:07

## 2021-06-16 NOTE — PROGRESS NOTE ADULT - PROBLEM SELECTOR PLAN 8
Social work consult, history of homelessness, patient not homeless right now. Lack of access to medications.   -UTox positive for cocaine and THC, c/w seroquel 200 mg bedtime      Dispo: pending response from referral to [facility] for HIV and nursing for rehab  Patient was accepted then refused other rehab facility on 6/11

## 2021-06-16 NOTE — PROGRESS NOTE ADULT - PROBLEM SELECTOR PROBLEM 3
HIV (human immunodeficiency virus infection)

## 2021-06-16 NOTE — PROGRESS NOTE ADULT - NSICDXPILOT_GEN_ALL_CORE
[place]

## 2021-06-16 NOTE — PROGRESS NOTE ADULT - PROBLEM SELECTOR PROBLEM 8
Mother  Still living? No  Family history of melanoma, Age at diagnosis: Age Unknown
Discharge planning issues

## 2021-06-16 NOTE — PROGRESS NOTE ADULT - PROBLEM/PLAN-3
VM left for pt to return call to [facility] - [phone]. Dosing instructions not given to pt.  See pt findings for chart review of admission.  Kenyetta Cooper RN on 11/1/2018 at 11:08 AM    
DISPLAY PLAN FREE TEXT

## 2021-06-16 NOTE — PROGRESS NOTE ADULT - PROBLEM SELECTOR PLAN 3
-patient noncompliant on medications d/t lack of access (when asked, patient states he "does not know where to get them").   -HIV viral load 7,525   - CD4 count pending   -ID recs appreciated  - resumed Biktarvy per ID recs   - f/u blood cultures (patient not septic on presentation, reported fevers at home)- NG  - hepatitis panel:  negative   - gonorrhea/chlamydia negative  - Quant gold negative   - will follow with HIV clinic at [facility] outpatient

## 2021-06-16 NOTE — PROGRESS NOTE ADULT - REASON FOR ADMISSION
Bilateral LE weakness

## 2021-06-16 NOTE — PROGRESS NOTE ADULT - ATTENDING COMMENTS
Patient seen and examined. Case discussed with the medical team on rounds. I agree with the findings and the plan above.    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 Multiple hospital visits and previously eloped, now w/possible GBS, though with possible of malingering.     MR Lumbar spine - no evidence of large epidural abscess or central canal stenosis. Patient refused MRI of cervical, thoracic and head. As per neuro, no urgency.   Continue HIV treatment as per ID, will require close follow up as outpatient, D/W Dr. Hunter, infectious disease  s/p dental work, as per dental will require extraction. Dental also recommends Augmentin for two weeks.   Patient with hard subcutaneous bump on the right lower extremity, U/S obtained showing simple cystic lesion.   Optimized for discharge, though declined specific rehab upon discharge on Friday. Will reattempt to discharge after dental work.   Continue the rest of the work up and management as stated above.

## 2021-06-16 NOTE — PROGRESS NOTE ADULT - PROBLEM SELECTOR PROBLEM 1
Leg weakness, bilateral

## 2021-06-16 NOTE — PROGRESS NOTE ADULT - SUBJECTIVE AND OBJECTIVE BOX
Pt presents for extraction of teeth #s 29,30, and root tips #31.  Teeth #31 is non-restorable and require extractions. Discussed options of treatment for teeth #s 29 and 30 - RCT + crowns vs. extraction.  Pt stated that he changed his mind about following up with an outside dentist for RCT of teeth #s 29 and 30 and would like to proceed with extractions of teeth #s 29,30,31 at today's visit.    Patient identity confirmed - verified by name and date of birth.  Verbal and written consent obtained.    Procedural details:  Topical benzocaine. 1 carpule 2% lidocaine (1:100,000 Epi) for TRINIDAD block of R side. 1 carpule 4% articaine (1:100,000 Epi) for B/L infiltration and PDL infiltrations around teeth #s 29,30,31.  Tissue  with periosteal elevator. Teeth elevated with offset and straight elevators. Teeth delivered with lower universal forceps without complications. Residual tissue removed with rongeurs. Curetted sockets, irrigated with saline. Sites closed with 3-0 gut chromic sutures. Gauze pressure placed for hemostasis. EBL minimal.  Pt instructed to take OTC analgesics as needed for pain.   Continue antibiotics as per med team.    Treatment at this visit: Exo #29,30,31  NV: Pt was instructed to call the [facility] dental clinic to schedule a post-op appointment as needed.    Isabella Rosa DDS, #[id number]  Em Rodney DDS #[id number] Patient is a 31y old  Male who presents with a chief complaint of Bilateral LE weakness (16 Jun 2021 16:08).    HPI:  31 year old man,  with congential HIV, GBS diagnosed one year ago responded to treatment and PT (now with residual LE weakness, requires cane intermittentl), who p/w acute worsening B/L LE weakness x 2 days causing inability to walk a/w fever (102 fever yesterday) as well as new urinary urge and difficulty emptying bladder.  Patient reports last year had initially had developed severe bilateral LE weakness and was eventually diagnosed with GBS at [place] where he underwent treatment and improved.  He was doing well at Rehab and was well supplied with HIV medications as well at which time he was compliant with meds.  In last two months, he has not had regular access to HIV meds and thus has not been taking his Bictarvy. He recently presented to [facility] for similar worsening paraparesis at which time MRI C/T/L spine and LP were done which were unrevealing (05/2021).      On ROS, patient endorses bifrontal headache, denies visual changes, blurry vision, fever, chest pain,  saddle anesthesia or numbness otherwise, fecal incontinence.     In the ED, rectal Tmax 98.4, HR 75-92, -142/57-80, RR 16-17, 100% on room air. Patient given 1x tylenol and 1xativan. Neurology consulted, MR lumbar spine benign.  (05 Jun 2021 10:52)      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    Asthma    CVA (cerebral vascular accident)    Closed fracture of multiple ribs of right side, initial encounter    Cocaine abuse    Chronic sinusitis    Homeless    GBS (Guillain [place] syndrome)    History of orthopedic surgery  left arm    MEDICATIONS  (STANDING):  ammonium lactate 12% Lotion 1 Application(s) Topical every 12 hours  amoxicillin  875 milliGRAM(s)/clavulanate 1 Tablet(s) Oral every 12 hours  baclofen 10 milliGRAM(s) Oral two times a day  bictegravir 50 mG/emtricitabine 200 mG/tenofovir alafenamide 25 mG (BIKTARVY) 1 Tablet(s) Oral daily  gabapentin 300 milliGRAM(s) Oral three times a day  melatonin 3 milliGRAM(s) Oral at bedtime  QUEtiapine 200 milliGRAM(s) Oral at bedtime    MEDICATIONS  (PRN):  acetaminophen   Tablet .. 975 milliGRAM(s) Oral every 6 hours PRN Mild Pain (1 - 3), Moderate Pain (4 - 6), Severe Pain (7 - 10)  FIRST- Mouthwash  BLM 15 milliLiter(s) Swish and Swallow every 6 hours PRN mouth pain  ibuprofen  Tablet. 400 milliGRAM(s) Oral every 8 hours PRN Mild Pain (1 - 3), Moderate Pain (4 - 6)      Allergies    Ceclor (Unknown)  Toradol (Anaphylaxis; Swelling)  Toradol (Swelling)    Intolerances    FAMILY HISTORY:  FH: HIV infection (Mother)    Vital Signs Last 24 Hrs  T(C): 36.6 (16 Jun 2021 12:17), Max: 36.6 (16 Jun 2021 12:17)  T(F): 97.9 (16 Jun 2021 12:17), Max: 97.9 (16 Jun 2021 12:17)  HR: 72 (16 Jun 2021 12:17) (67 - 79)  BP: 107/60 (16 Jun 2021 12:17) (107/60 - 136/85)  BP(mean): --  RR: 16 (16 Jun 2021 12:17) (16 - 16)  SpO2: 100% (16 Jun 2021 12:17) (97% - 100%)    Pt presents for extraction of teeth #s 29,30, and root tips #31.  Teeth #31 is non-restorable and require extractions. Discussed options of treatment for teeth #s 29 and 30 - RCT + crowns vs. extraction.  Pt stated that he changed his mind about following up with an outside dentist for RCT of teeth #s 29 and 30 and would like to proceed with extractions of teeth #s 29,30,31 at today's visit.    Patient identity confirmed - verified by name and date of birth.  Verbal and written consent obtained.    Procedural details:  Topical benzocaine. 1 carpule 2% lidocaine (1:100,000 Epi) for TRINIDAD block of R side. 1 carpule 4% articaine (1:100,000 Epi) for B/L infiltration and PDL infiltrations around teeth #s 29,30,31.  Tissue  with periosteal elevator. Teeth elevated with offset and straight elevators. Teeth delivered with lower universal forceps without complications. Residual tissue removed with rongeurs. Curetted sockets, irrigated with saline. Sites closed with 3-0 gut chromic sutures. Gauze pressure placed for hemostasis. EBL minimal.  Pt instructed to take OTC analgesics as needed for pain.   Continue antibiotics as per med team.    Treatment at this visit: Exo #29,30,31  NV: Pt was instructed to call the [facility] dental clinic to schedule a post-op appointment as needed.    Isabella Rosa DDS, #[id number]  Em Rodney DDS #[id number]

## 2021-06-16 NOTE — PROGRESS NOTE ADULT - PROBLEM SELECTOR PLAN 5
patient reports diagnosis of afib approximately 1 year ago, reported stroke but no residual deficits, no documentation from prior hospital visits of afib diagnosis, stroke worked up but was negative  -continue to monitor for now, hold off AC RLE calf, small bump, tender, indurated, immovable,   - s/p U/S which shows Small subcutaneous fluid collection along the medial aspect of the right ankle.  - 1.5x0.3 cm   - warm compresses  - CTM

## 2021-06-16 NOTE — PROGRESS NOTE ADULT - PROVIDER SPECIALTY LIST ADULT
Dental
Infectious Disease
Infectious Disease
Dental
Dental
Infectious Disease
Infectious Disease
Internal Medicine
Neurology
Internal Medicine

## 2021-06-16 NOTE — PROGRESS NOTE ADULT - SUBJECTIVE AND OBJECTIVE BOX
CC: Patient is a 31y old  Male who presents with a chief complaint of Bilateral LE weakness (2021 16:08)    ID following for LE weakness, HIV care    Interval History/ROS: Patient has no new complaints. For teeth extraction today. Has a small cystic lesion on his right leg, US with small subcutaneous fluid collection along the medial aspect of the right ankle.    Rest of ROS negative.    Allergies  Ceclor (Unknown)  Toradol (Anaphylaxis; Swelling)  Toradol (Swelling)    ANTIMICROBIALS:  amoxicillin  875 milliGRAM(s)/clavulanate 1 every 12 hours  bictegravir 50 mG/emtricitabine 200 mG/tenofovir alafenamide 25 mG (BIKTARVY) 1 daily    OTHER MEDS:  acetaminophen   Tablet .. 975 milliGRAM(s) Oral every 6 hours PRN  ammonium lactate 12% Lotion 1 Application(s) Topical every 12 hours  baclofen 10 milliGRAM(s) Oral two times a day  FIRST- Mouthwash  BLM 15 milliLiter(s) Swish and Swallow every 6 hours PRN  gabapentin 300 milliGRAM(s) Oral three times a day  ibuprofen  Tablet. 400 milliGRAM(s) Oral every 8 hours PRN  melatonin 3 milliGRAM(s) Oral at bedtime  QUEtiapine 200 milliGRAM(s) Oral at bedtime      PE:    Vital Signs Last 24 Hrs  T(C): 36.6 (2021 12:17), Max: 36.6 (2021 12:17)  T(F): 97.9 (2021 12:17), Max: 97.9 (2021 12:17)  HR: 72 (2021 12:17) (67 - 79)  BP: 107/60 (2021 12:17) (107/60 - 136/85)  BP(mean): --  RR: 16 (2021 12:17) (16 - 16)  SpO2: 100% (2021 12:17) (97% - 100%)    Gen: AOx3, NAD  CV: S1+S2 normal, no murmurs  Resp: Clear bilat, no resp distress  Abd: Soft, nontender, +BS  Ext: No LE edema, no wounds  : No Briz[place]a  IV/Skin: No thrombophlebitis, right leg small cystic lesion, notender  Neuro: no focal deficits    LABS:    MICROBIOLOGY:  v  .Blood Blood-Venous  21   No Growth Final  --  --      .Blood Blood-Peripheral  21   No Growth Final  --  --      .Blood Blood  21   No growth at 5 days.  --  --      .Blood Blood  21   No growth at 5 days.  --  --    HIV-1 RNA Quantitative, Viral Load Log: 3.88 (21 @ 20:10)  HIV-1 RNA Quantitative, Viral Load Lo.58 (21 @ 06:03)  HIV-1 RNA Quantitative, Viral Load Lo.88 (10-25-20 @ 21:28)      RADIOLOGY:    < from: US Duplex Venous Lower Ext Ltd, Right (06.15.21 @ 12:22) >  IMPRESSION:  No evidence of right lower extremity deep venous thrombosis.  Small subcutaneous fluid collection along the medial aspect of the right ankle.    < end of copied text >

## 2021-06-16 NOTE — PROGRESS NOTE ADULT - ASSESSMENT
Needs appt first.    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    here has remained afebrile. Still with LE weakness.  MRI L-spine unremarkable. Now pending MRI C/T spine and head.  Uncontrolled HIV, undomiciled, does not follow with an HIV specialist at this time. Off ARTs for about two months. Receives his ARTs when discharged from hospital setting. Has no cell phone.  C/o right sided tooth pain s/p tooth extraction #30, no purulence.    Recommend:  #LE weakness  -Improving  -Appreciate Neurology following  -Nontoxic appearing, afebrile   -Blood cultures so far no growth  -Planned for rehab  -MRI l-spine unremarkable    #HIV  -Continue Biktarvy  -On discharge, can follow up at [facility] for continued HIV care on 7/1/2021 at 10 am.    #Right sided dental pain  -Appreciate dental evaluation  -For dental extraction today    #HCV ab weakly  -HCV RNA negative    #Right cystic lesion  -US with fluid collection    Easton Hunter MD  Pager [phone]  After 5pm/weekends call [phone]    Discussed plan with primary team.

## 2021-06-16 NOTE — PROGRESS NOTE ADULT - PROBLEM SELECTOR PROBLEM 2
GBS (Guillain [place] syndrome)
GBS (Guillain [place] syndrome)
GBS (Guillain [place] syndrome)
GBS (Guillain [place] syndrome)
GBS (Guillain [place] syndrome)
GBS (Guillain [place] syndrome)
GBS (Guillain [place] syndrome)
GBS (Guillain [place] syndrome)
GBS (Guillain [place] syndrome)
GBS (Guillain [place] syndrome)
GBS (Guillain [place] syndrome)

## 2021-06-16 NOTE — PROGRESS NOTE ADULT - PROBLEM SELECTOR PLAN 7
Social work consult, history of homelessness, patient not homeless right now. Lack of access to medications.   -UTox positive for cocaine and THC, c/w seroquel 200 mg bedtime      Dispo: pending response from referral to [facility] for HIV and nursing for rehab  Patient was accepted then refused other rehab facility on 6/11 DVT prophylaxis: lovenox 40mg subQ  Diet: regular  Dispo: social work consult given homelessness, PT recommends rehab,  Discharge to nursing home per patient's preference

## 2021-06-16 NOTE — PROGRESS NOTE ADULT - ASSESSMENT
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 Thus far work up has been negative, refused MRI, LE weakness improving. However now pending tooth extraction 6/16  Cleared for discharge to rehab however refused to leave until referral sent to [facility] in [place] now accepted to [facility]

## 2021-06-16 NOTE — CHART NOTE - NSCHARTNOTEFT_GEN_A_CORE
Alerted by ANDREA Goodwin that pt w/ diarrhea. Interviewed pt at bedside who reports 3 episodes of watery diarrhea, not malodorous, without melena/hematochezia. Otherwise he states that he feels generally well, is not concerned re sx, and denies dizziness, HA, CP, palpitations, SOB, abd pain, n/v, back pain. Requested RN take VS, revealing T 98.1F, HR 87, /94, RR 16, SpO2% 99 on RA. Examined pt in NAD, pleasant to interview; NCAT; PERRL; oral MMM w/ R lower gum changes s/p dental intervention; lungs CTAB no w/r/r; RRR no m/r/g; Abd NTND, BS+, no guarding/rebound tenderness/rigidity; peripheral pulses equal and intact; no LE edema; UE strength intact, b/l LE weakness.     Discussed w/ pt, amenable and stable to be transported to rehabilitation, encouraged pt to maintain adequate po intake including hydration. RN to alert w/ any change in clinical status. Alerted by ANDREA Goodwin that pt w/ diarrhea. Interviewed pt at bedside who reports 3 episodes of watery diarrhea, not malodorous, without melena/hematochezia. Otherwise he states that he feels generally well, tolerating po intake, is not concerned re sx, and denies dizziness, HA, CP, palpitations, SOB, abd pain, n/v, back pain. Requested RN take VS, revealing T 98.1F, HR 87, /94, RR 16, SpO2% 99 on RA. Examined pt in NAD, pleasant to interview; NCAT; PERRL; oral MMM w/ R lower gum changes s/p dental intervention; lungs CTAB no w/r/r; RRR no m/r/g; Abd NTND, BS+, no guarding/rebound tenderness/rigidity; peripheral pulses equal and intact; no LE edema; UE strength intact, b/l LE weakness.     Discussed w/ pt, amenable and stable to be transported to rehabilitation, encouraged pt to maintain adequate po intake including hydration. RN to alert w/ any change in clinical status. Alerted by ANDREA Goodwin that pt w/ diarrhea. Interviewed pt at bedside who reports 3 episodes of watery diarrhea, not malodorous, without melena/hematochezia. Otherwise he states that he feels generally well, tolerating po intake, is not concerned re sx, and denies dizziness, HA, CP, palpitations, SOB, abd pain, n/v, back pain. Requested RN take VS, revealing T 98.1F, HR 87, /94, RR 16, SpO2% 99 on RA. Examined pt in NAD, pleasant to interview; NCAT; PERRL; oral MMM w/ R lower gum changes s/p dental intervention; lungs CTAB no w/r/r; RRR no m/r/g; Abd NTND, BS+, no guarding/rebound tenderness/rigidity; peripheral pulses equal and intact; no LE edema; UE strength intact, b/l LE weakness.     Discussed w/ pt, possibly iso abx use and effect on GI alverto, amenable and stable to be transported to rehabilitation, encouraged pt to maintain adequate po intake including hydration. RN to alert w/ any change in clinical status.

## 2021-06-16 NOTE — PROGRESS NOTE ADULT - PROBLEM SELECTOR PLAN 1
-neurology onboard, appreciate recommendations  -MRI L/S spine w/wo contrast limited by motion artifact, benign (negative for OM, abscess, or spinal stenosis)  -f/u MR brain, cervical and thoracic spine  -f/u screen for HSV,1/2, VZV (+former infection), CMV (negative), WNV and syphilis (negative), lyme (negative), B12 (wnl), folate (wnl).     -PT consult- recs rehab  - Patient refused MRI 6/9/21  - LE weakness improving  - cleared from neuro perspective for discharge  - patient pending placement at [facility] for HIV Quality 431: Preventive Care And Screening: Unhealthy Alcohol Use - Screening: Patient screened for unhealthy alcohol use using a single question and scores less than 2 times per year Detail Level: Detailed Quality 402: Tobacco Use And Help With Quitting Among Adolescents: Patient screened for tobacco and never smoked Quality 130: Documentation Of Current Medications In The Medical Record: Current Medications Documented

## 2021-06-16 NOTE — PROGRESS NOTE ADULT - PROBLEM SELECTOR PLAN 4
Right tooth pain (possible second molar cavity)  - dental consult placed  - s/p tooth I&D 6/9  - now pending  extraction 29,30,31 on 6/16/21  - Tylenol and ibuprofen PRN for pain   - augmentin for 14 d 6/14-6/28

## 2021-06-16 NOTE — PROGRESS NOTE ADULT - PROBLEM SELECTOR PLAN 2
-history of GBS diagnosed last year, responded well to treatment  -consider LP if imaging unrevealing?  - LP at [facility] 5/26 unrevealing
-history of GBS diagnosed last year, responded well to treatment  -consider LP if imaging unrevealing?  -consider recurrence of GBS, will discuss
-history of GBS diagnosed last year, responded well to treatment  -consider LP if imaging unrevealing?  - LP at [facility] 5/26 unrevealing
-history of GBS diagnosed last year, responded well to treatment  -consider LP if imaging unrevealing?  - LP at [facility] 5/26 unrevealing
-history of GBS diagnosed last year, responded well to treatment  -consider LP if imaging unrevealing?  - LP at [facility] 5/26 unrevealing
-history of GBS diagnosed last year, responded well to treatment  -consider LP if imaging unrevealing?  - LP at [facility] 5/26 unrevealing
-history of GBS diagnosed last year, responded well to treatment  -consider LP if imaging unrevealing?  - LP at [facility] 5/26 unrevealing
-history of GBS diagnosed last year, responded well to treatment  -consider LP if imaging unrevealing?  - LP at [facility] 5/26 unrevealing
-history of GBS diagnosed last year, responded well to treatment  -consider LP if imaging unrevealing?  - LP at [facility] 5/26 unrevealing
-history of GBS diagnosed last year, responded well to treatment  -consider LP if imaging unrevealing?  - LP at [facility] 5/26 unrevealing
-history of GBS diagnosed last year, responded well to treatment  -consider LP if imaging unrevealing?  - LP at [facility] 5/26 unrevealing

## 2021-06-16 NOTE — PROGRESS NOTE ADULT - PROBLEM SELECTOR PLAN 6
DVT prophylaxis: lovenox 40mg subQ  Diet: regular  Dispo: social work consult given homelessness, PT recommends rehab,  Discharge to nursing home per patient's preference patient reports diagnosis of afib approximately 1 year ago, reported stroke but no residual deficits, no documentation from prior hospital visits of afib diagnosis, stroke worked up but was negative  -continue to monitor for now, hold off AC

## 2021-06-16 NOTE — PROGRESS NOTE ADULT - SUBJECTIVE AND OBJECTIVE BOX
Laura Lau MD  PGY 1 Department of Internal Medicine  Pager: [phone] ([facility] [place])       Patient is a 31y old  Male who presents with a chief complaint of Bilateral LE weakness (15 Mian 2021 07:36)      SUBJECTIVE / OVERNIGHT EVENTS: Pt seen and examined. No acute overnight events. Left sided submandibular lymph node tenderness along with Rt sided tooth pain, improved with NSAIDs  Pending right sided tooth extraction today. RLE calf, small bump, tender, indurated, immovable, s/p U/S which shows Small subcutaneous fluid collection along the medial aspect of the right ankle.          MEDICATIONS  (STANDING):  ammonium lactate 12% Lotion 1 Application(s) Topical every 12 hours  amoxicillin  875 milliGRAM(s)/clavulanate 1 Tablet(s) Oral every 12 hours  baclofen 10 milliGRAM(s) Oral two times a day  bictegravir 50 mG/emtricitabine 200 mG/tenofovir alafenamide 25 mG (BIKTARVY) 1 Tablet(s) Oral daily  gabapentin 300 milliGRAM(s) Oral three times a day  melatonin 3 milliGRAM(s) Oral at bedtime  QUEtiapine 200 milliGRAM(s) Oral at bedtime    MEDICATIONS  (PRN):  acetaminophen   Tablet .. 975 milliGRAM(s) Oral every 6 hours PRN Mild Pain (1 - 3), Moderate Pain (4 - 6), Severe Pain (7 - 10)  FIRST- Mouthwash  BLM 15 milliLiter(s) Swish and Swallow every 6 hours PRN mouth pain  ibuprofen  Tablet. 400 milliGRAM(s) Oral every 8 hours PRN Mild Pain (1 - 3), Moderate Pain (4 - 6)      I&O's Summary      Vital Signs Last 24 Hrs  T(C): 36.4 (16 Jun 2021 06:02), Max: 36.8 (15 Mian 2021 12:44)  T(F): 97.6 (16 Jun 2021 06:02), Max: 98.2 (15 Mian 2021 12:44)  HR: 67 (16 Jun 2021 06:02) (67 - 95)  BP: 112/68 (16 Jun 2021 06:02) (112/68 - 136/85)  BP(mean): --  RR: 16 (16 Jun 2021 06:02) (16 - 18)  SpO2: 97% (16 Jun 2021 06:02) (97% - 100%)    CAPILLARY BLOOD GLUCOSE          PHYSICAL EXAM:  GENERAL: NAD, lying in bed comfortably  HEAD:  Atraumatic, Normocephalic  EYES: constricted pupils b/l (Utox positive for cocaine)   ENT: Moist mucous membranes,   Mouth: Rt sided mandible pain on palpation  CHEST/LUNG: Clear to auscultation bilaterally; No rales, rhonchi, wheezing, or rubs. Unlabored respirations, on room air   HEART: Regular rate and rhythm; No murmurs, rubs, or gallops  ABDOMEN: Bowel sounds present; Soft, Nontender, Nondistended.   EXTREMITIES:  2+ Peripheral Pulses intact b/l, no LE edema, small hard, indurated, nontender, immovable bump on Rt calf   NERVOUS SYSTEM:  Alert & Oriented X3, speech clear. CN I-XII intact. LE 2/5 strength b/l (improved from yesterday) . Sensory intact in all extremities. UE 5/5 strength b/l   SKIN; dry skin on feet b/l        LABS:        RADIOLOGY & ADDITIONAL TESTS:    EXAM:  US DPLX LWR EXT VEINS LTD RT        PROCEDURE DATE:  Mian 15 2021     INTERPRETATION:  CLINICAL INFORMATION: Right leg swelling. Rule out deep vein thrombosis.    COMPARISON: None available.    TECHNIQUE: Duplex sonography of the RIGHT LOWER extremity veins with color and spectral Doppler, with and without compression.    FINDINGS:    There is normal compressibility of the right common femoral, femoral and popliteal veins.  The contralateral common femoral vein is patent.  Doppler examination shows normal spontaneous and phasic flow.    No calf vein thrombosis is detected.    The region of palpable abnormality in the medial aspect of the right ankle corresponds to 1.5 x 0.3 cm subcutaneous fluid collection.    IMPRESSION:  No evidence of right lower extremity deep venous thrombosis.  Small subcutaneous fluid collection along the medial aspect of the right ankle.

## 2021-06-22 PROCEDURE — 82553 CREATINE MB FRACTION: CPT

## 2021-06-22 PROCEDURE — 80053 COMPREHEN METABOLIC PANEL: CPT

## 2021-06-22 PROCEDURE — 85730 THROMBOPLASTIN TIME PARTIAL: CPT

## 2021-06-22 PROCEDURE — 96374 THER/PROPH/DIAG INJ IV PUSH: CPT

## 2021-06-22 PROCEDURE — 82550 ASSAY OF CK (CPK): CPT

## 2021-06-22 PROCEDURE — 99285 EMERGENCY DEPT VISIT HI MDM: CPT | Mod: 25

## 2021-06-22 PROCEDURE — 84100 ASSAY OF PHOSPHORUS: CPT

## 2021-06-22 PROCEDURE — 87040 BLOOD CULTURE FOR BACTERIA: CPT

## 2021-06-22 PROCEDURE — U0005: CPT

## 2021-06-22 PROCEDURE — 86140 C-REACTIVE PROTEIN: CPT

## 2021-06-22 PROCEDURE — U0003: CPT

## 2021-06-22 PROCEDURE — 85652 RBC SED RATE AUTOMATED: CPT

## 2021-06-22 PROCEDURE — 80048 BASIC METABOLIC PNL TOTAL CA: CPT

## 2021-06-22 PROCEDURE — 85610 PROTHROMBIN TIME: CPT

## 2021-06-22 PROCEDURE — 36415 COLL VENOUS BLD VENIPUNCTURE: CPT

## 2021-06-22 PROCEDURE — 83605 ASSAY OF LACTIC ACID: CPT

## 2021-06-22 PROCEDURE — 72158 MRI LUMBAR SPINE W/O & W/DYE: CPT

## 2021-06-22 PROCEDURE — 81001 URINALYSIS AUTO W/SCOPE: CPT

## 2021-06-22 PROCEDURE — 93971 EXTREMITY STUDY: CPT

## 2021-06-22 PROCEDURE — 80307 DRUG TEST PRSMV CHEM ANLYZR: CPT

## 2021-06-22 PROCEDURE — 85025 COMPLETE CBC W/AUTO DIFF WBC: CPT

## 2021-06-22 PROCEDURE — 83735 ASSAY OF MAGNESIUM: CPT

## 2021-06-22 PROCEDURE — 72131 CT LUMBAR SPINE W/O DYE: CPT

## 2021-06-22 PROCEDURE — 0225U NFCT DS DNA&RNA 21 SARSCOV2: CPT

## 2021-06-22 PROCEDURE — 97163 PT EVAL HIGH COMPLEX 45 MIN: CPT

## 2021-06-22 PROCEDURE — 86769 SARS-COV-2 COVID-19 ANTIBODY: CPT

## 2021-06-22 PROCEDURE — 70450 CT HEAD/BRAIN W/O DYE: CPT

## 2021-06-23 ENCOUNTER — NON-APPOINTMENT (OUTPATIENT)
Age: 31
End: 2021-06-23

## 2021-07-01 ENCOUNTER — APPOINTMENT (OUTPATIENT)
Dept: INFECTIOUS DISEASE | Facility: CLINIC | Age: 31
End: 2021-07-01

## 2021-07-01 NOTE — OCCUPATIONAL THERAPY INITIAL EVALUATION ADULT - LEVEL OF INDEPENDENCE: BED TO CHAIR, REHAB EVAL
negative
moderate assist (50% patients effort)/stand pivot transfer
pt adamantly refusing despite encouragement and education

## 2021-07-11 NOTE — ED PROVIDER NOTE - HIV OFFER
Previously positive Pt presents S/P dog bites to dorsum of both hands and worsening erythema, swelling, pain. fever on VS. no areas that need drainage. cellulitis. Will Obs for IV abxs several rounds.

## 2021-07-15 ENCOUNTER — APPOINTMENT (OUTPATIENT)
Dept: INFECTIOUS DISEASE | Facility: CLINIC | Age: 31
End: 2021-07-15

## 2021-07-27 NOTE — ED ADULT NURSE NOTE - NS ED NURSE RECORD ANOTHER HT AND WT
Hello,  I was asked by our provider in clinic to forward your patients information to you. Shakir was asked to follow up with you last week re: dizziness and blood pressure medication. He continues to experience dizziness. Today his blood pressure in clinic was 116/69 -- he had not taken his blood pressure medications today yet. Daniel Johnson PA-C advised pt not to take his BP meds today and follow up with you today.  Thank you,  Jerilyn Brownlee RN on 7/27/2021 at 10:40 AM   Yes

## 2021-08-01 PROCEDURE — G9005: CPT

## 2021-08-16 ENCOUNTER — APPOINTMENT (OUTPATIENT)
Dept: INFECTIOUS DISEASE | Facility: CLINIC | Age: 31
End: 2021-08-16

## 2021-08-18 ENCOUNTER — APPOINTMENT (OUTPATIENT)
Dept: INFECTIOUS DISEASE | Facility: CLINIC | Age: 31
End: 2021-08-18

## 2021-08-23 ENCOUNTER — NON-APPOINTMENT (OUTPATIENT)
Age: 31
End: 2021-08-23

## 2021-08-24 ENCOUNTER — NON-APPOINTMENT (OUTPATIENT)
Age: 31
End: 2021-08-24

## 2021-08-30 NOTE — PHYSICAL THERAPY INITIAL EVALUATION ADULT - CRITERIA FOR SKILLED THERAPEUTIC INTERVENTIONS
impairments found/functional limitations in following categories Recognize danger situations.  For example, stress, drinking alcohol, urges to smoke, smoking cues, cigarette availability

## 2021-09-09 ENCOUNTER — INPATIENT (INPATIENT)
Facility: HOSPITAL | Age: 31
LOS: 7 days | Discharge: NURSING, REHAB OR LONG-TERM CARE | End: 2021-09-17
Attending: INTERNAL MEDICINE | Admitting: INTERNAL MEDICINE
Payer: MEDICAID

## 2021-09-09 VITALS
DIASTOLIC BLOOD PRESSURE: 78 MMHG | RESPIRATION RATE: 16 BRPM | HEART RATE: 73 BPM | OXYGEN SATURATION: 100 % | HEIGHT: 71 IN | SYSTOLIC BLOOD PRESSURE: 140 MMHG | TEMPERATURE: 98 F

## 2021-09-09 DIAGNOSIS — Z98.890 OTHER SPECIFIED POSTPROCEDURAL STATES: Chronic | ICD-10-CM

## 2021-09-09 LAB
ALBUMIN SERPL ELPH-MCNC: 4.7 G/DL — SIGNIFICANT CHANGE UP (ref 3.3–5)
ALP SERPL-CCNC: 74 U/L — SIGNIFICANT CHANGE UP (ref 40–120)
ALT FLD-CCNC: 20 U/L — SIGNIFICANT CHANGE UP (ref 4–41)
ANION GAP SERPL CALC-SCNC: 16 MMOL/L — HIGH (ref 7–14)
AST SERPL-CCNC: 51 U/L — HIGH (ref 4–40)
BASOPHILS # BLD AUTO: 0.02 K/UL — SIGNIFICANT CHANGE UP (ref 0–0.2)
BASOPHILS NFR BLD AUTO: 0.4 % — SIGNIFICANT CHANGE UP (ref 0–2)
BILIRUB SERPL-MCNC: 0.6 MG/DL — SIGNIFICANT CHANGE UP (ref 0.2–1.2)
BUN SERPL-MCNC: 19 MG/DL — SIGNIFICANT CHANGE UP (ref 7–23)
CALCIUM SERPL-MCNC: 9.6 MG/DL — SIGNIFICANT CHANGE UP (ref 8.4–10.5)
CHLORIDE SERPL-SCNC: 102 MMOL/L — SIGNIFICANT CHANGE UP (ref 98–107)
CO2 SERPL-SCNC: 21 MMOL/L — LOW (ref 22–31)
CREAT SERPL-MCNC: 1 MG/DL — SIGNIFICANT CHANGE UP (ref 0.5–1.3)
EOSINOPHIL # BLD AUTO: 0.2 K/UL — SIGNIFICANT CHANGE UP (ref 0–0.5)
EOSINOPHIL NFR BLD AUTO: 3.9 % — SIGNIFICANT CHANGE UP (ref 0–6)
FOLATE SERPL-MCNC: 20 NG/ML — HIGH (ref 3.1–17.5)
GLUCOSE SERPL-MCNC: 87 MG/DL — SIGNIFICANT CHANGE UP (ref 70–99)
HCT VFR BLD CALC: 41.6 % — SIGNIFICANT CHANGE UP (ref 39–50)
HGB BLD-MCNC: 13.9 G/DL — SIGNIFICANT CHANGE UP (ref 13–17)
IANC: 1.25 K/UL — LOW (ref 1.5–8.5)
IMM GRANULOCYTES NFR BLD AUTO: 0 % — SIGNIFICANT CHANGE UP (ref 0–1.5)
LYMPHOCYTES # BLD AUTO: 2.97 K/UL — SIGNIFICANT CHANGE UP (ref 1–3.3)
LYMPHOCYTES # BLD AUTO: 58.5 % — HIGH (ref 13–44)
MAGNESIUM SERPL-MCNC: 1.8 MG/DL — SIGNIFICANT CHANGE UP (ref 1.6–2.6)
MCHC RBC-ENTMCNC: 29.1 PG — SIGNIFICANT CHANGE UP (ref 27–34)
MCHC RBC-ENTMCNC: 33.4 GM/DL — SIGNIFICANT CHANGE UP (ref 32–36)
MCV RBC AUTO: 87 FL — SIGNIFICANT CHANGE UP (ref 80–100)
MONOCYTES # BLD AUTO: 0.64 K/UL — SIGNIFICANT CHANGE UP (ref 0–0.9)
MONOCYTES NFR BLD AUTO: 12.6 % — SIGNIFICANT CHANGE UP (ref 2–14)
NEUTROPHILS # BLD AUTO: 1.25 K/UL — LOW (ref 1.8–7.4)
NEUTROPHILS NFR BLD AUTO: 24.6 % — LOW (ref 43–77)
NRBC # BLD: 0 /100 WBCS — SIGNIFICANT CHANGE UP
NRBC # FLD: 0 K/UL — SIGNIFICANT CHANGE UP
PHOSPHATE SERPL-MCNC: 3.4 MG/DL — SIGNIFICANT CHANGE UP (ref 2.5–4.5)
PLATELET # BLD AUTO: 256 K/UL — SIGNIFICANT CHANGE UP (ref 150–400)
POTASSIUM SERPL-MCNC: 4.5 MMOL/L — SIGNIFICANT CHANGE UP (ref 3.5–5.3)
POTASSIUM SERPL-SCNC: 4.5 MMOL/L — SIGNIFICANT CHANGE UP (ref 3.5–5.3)
PROT SERPL-MCNC: 8.1 G/DL — SIGNIFICANT CHANGE UP (ref 6–8.3)
RBC # BLD: 4.78 M/UL — SIGNIFICANT CHANGE UP (ref 4.2–5.8)
RBC # FLD: 12.9 % — SIGNIFICANT CHANGE UP (ref 10.3–14.5)
SARS-COV-2 RNA SPEC QL NAA+PROBE: SIGNIFICANT CHANGE UP
SODIUM SERPL-SCNC: 139 MMOL/L — SIGNIFICANT CHANGE UP (ref 135–145)
TSH SERPL-MCNC: 1.19 UIU/ML — SIGNIFICANT CHANGE UP (ref 0.27–4.2)
VIT B12 SERPL-MCNC: 396 PG/ML — SIGNIFICANT CHANGE UP (ref 200–900)
WBC # BLD: 5.08 K/UL — SIGNIFICANT CHANGE UP (ref 3.8–10.5)
WBC # FLD AUTO: 5.08 K/UL — SIGNIFICANT CHANGE UP (ref 3.8–10.5)

## 2021-09-09 PROCEDURE — 93010 ELECTROCARDIOGRAM REPORT: CPT

## 2021-09-09 PROCEDURE — 99285 EMERGENCY DEPT VISIT HI MDM: CPT | Mod: 25

## 2021-09-09 PROCEDURE — 71046 X-RAY EXAM CHEST 2 VIEWS: CPT | Mod: 26

## 2021-09-09 RX ORDER — GABAPENTIN 400 MG/1
300 CAPSULE ORAL ONCE
Refills: 0 | Status: COMPLETED | OUTPATIENT
Start: 2021-09-09 | End: 2021-09-09

## 2021-09-09 RX ORDER — BACLOFEN 100 %
5 POWDER (GRAM) MISCELLANEOUS ONCE
Refills: 0 | Status: COMPLETED | OUTPATIENT
Start: 2021-09-09 | End: 2021-09-09

## 2021-09-09 RX ADMIN — GABAPENTIN 300 MILLIGRAM(S): 400 CAPSULE ORAL at 20:46

## 2021-09-09 NOTE — ED ADULT NURSE NOTE - NSICDXPASTMEDICALHX_GEN_ALL_CORE_FT
PAST MEDICAL HISTORY:  Asthma     Chronic sinusitis     Closed fracture of multiple ribs of right side, initial encounter     Cocaine abuse     CVA (cerebral vascular accident)     GBS (Guillain Dukedom syndrome)     HIV (human immunodeficiency virus infection) from birth    Homeless

## 2021-09-09 NOTE — ED ADULT NURSE NOTE - OBJECTIVE STATEMENT
Pt presents to intake, A&Ox4, ambulatory w/o assistance, pmhx of Guillain-Barré syndrome, here for evaluation of weakness and body aches "for a while now." Denies chest pain, shortness of breath, palpitations, diaphoresis, headaches, fevers, dizziness, nausea, vomiting, diarrhea, or urinary symptoms at this time. IV established in right hand with a 20G, labs drawn and sent, call bell in reach, warm blanket provided, bed in lowest position, side rails up x2, MD evaluation in progress. Will continue to monitor.

## 2021-09-09 NOTE — ED PROVIDER NOTE - NSICDXPASTMEDICALHX_GEN_ALL_CORE_FT
PAST MEDICAL HISTORY:  Asthma     Chronic sinusitis     Closed fracture of multiple ribs of right side, initial encounter     Cocaine abuse     CVA (cerebral vascular accident)     GBS (Guillain [place]on syndrome)     HIV (human immunodeficiency virus infection) from birth    Homeless

## 2021-09-09 NOTE — ED PROVIDER NOTE - CARE PLAN
Principal Discharge DX:	Lower extremity weakness   1 Principal Discharge DX:	Lower extremity weakness  Secondary Diagnosis:	GBS (Guillain Munger syndrome)

## 2021-09-09 NOTE — ED PROVIDER NOTE - PHYSICAL EXAMINATION
Gen - NAD; cooperative; A+Ox3   HEENT - NCAT, EOMI, mild scleral icterus b/l  Neck - supple  Resp - CTAB  CV -  RRR  Abd - soft, NT, ND; no guarding or rebound  Back - no midline tenderness  MSK - 5/5 strength and FROM b/l UE; 4/5 strength in b/l LEs  Extrem - no LE edema/erythema/tenderness  Neuro - no focal sensation deficits Gen - NAD; cooperative; A+Ox3   HEENT - NCAT, EOMI, mild scleral icterus b/l  Neck - supple  Resp - CTAB  CV -  RRR  Abd - soft, NT, ND; no guarding or rebound  Back - no midline tenderness  MSK - 5/5 strength and FROM b/l UE; 4/5 strength in b/l LEs  Extrem - no LE edema/erythema/tenderness  Neuro - no focal sensation deficits  ATTENDING PHYSICAL EXAM  GEN - NAD; well appearing; A+O x3  HEAD - NC/AT; EYES/NOSE - PERRL, EOMI, mucous membranes moist, no discharge; THROAT: Oral cavity and pharynx normal. No inflammation, swelling, exudate, or lesions  NECK: Neck supple, non-tender without lymphadenopathy, no masses, no JVD  PULMONARY - CTA b/l, symmetric breath sounds, no w/r/r  CARDIAC -s1s2, RRR, no M,R,G  ABDOMEN - +NABS, ND, NT, soft, no guarding, no rebound, no masses   BACK - no CVA tenderness, No vertebral or paravertebral tenderness  EXTREMITIES - symmetric pulses, 2+ dp, capillary refill < 2 seconds, no clubbing, no cyanosis, no edema  SKIN - no rash or bruising   NEUROLOGIC - alert, CN 2-12 intact, reflexes nl, sensation nl, coordination nl, finger to nose nl, romberg negative, motor 5/5 RUE/LUE/RLE/LLE/EHL/Plantar flexion, no pronator drift, gait nl Gen - NAD; cooperative; A+Ox3   HEENT - NCAT, EOMI, mild scleral icterus b/l  Neck - supple  Resp - CTAB  CV -  RRR  Abd - soft, NT, ND; no guarding or rebound  Back - no midline tenderness  MSK - 5/5 strength and FROM b/l UE; 4/5 strength in b/l LEs  Extrem - no LE edema/erythema/tenderness  Neuro - no focal sensation deficits  ATTENDING PHYSICAL EXAM  GEN - NAD; well appearing; A+O x3  HEAD - NC/AT; EYES/NOSE - PERRL, EOMI, mucous membranes moist, no discharge; THROAT: Oral cavity and pharynx normal. No inflammation, swelling, exudate, or lesions  NECK: Neck supple, non-tender without lymphadenopathy, no masses, no JVD  PULMONARY - CTA b/l, symmetric breath sounds, no w/r/r  CARDIAC -s1s2, RRR, no M,R,G  ABDOMEN - +NABS, ND, NT, soft, no guarding, no rebound, no masses   BACK - no CVA tenderness, No vertebral or paravertebral tenderness  EXTREMITIES - symmetric pulses, 2+ dp, capillary refill < 2 seconds, no clubbing, no cyanosis, no edema  SKIN - no rash or bruising   NEUROLOGIC - alert, CN 2-12 intact, reflexes nl - 2+ b/l patella DTRs, sensation nl, coordination nl, finger to nose nl, motor 5/5 RUE/LUE/RLE/LLE/EHL/Plantar flexion

## 2021-09-09 NOTE — ED PROVIDER NOTE - PROGRESS NOTE DETAILS
Neurology consultation appreciated.  Will admit for further evaluation and management.  Patient agrees to plan. Neurology consultation appreciated.  Will admit for further evaluation and management.  Patient agrees to plan.  Discussed with Hospitalist.

## 2021-09-09 NOTE — ED PROVIDER NOTE - CLINICAL SUMMARY MEDICAL DECISION MAKING FREE TEXT BOX
32 y/o M with hx including GBS with b/l LE weakness and pain.  To ED with c/o increased pain and difficulty ambulating.  Check labs/lytes, ECG.  Neuro eval.

## 2021-09-09 NOTE — ED PROVIDER NOTE - OBJECTIVE STATEMENT
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 States that he was released from rehab 4d ago to housing program, and has since had progressive worsening of his LE weakness, now "stumbling around", having great difficulty taking care of himself. Also endorses significant pain lower back/LEs worse with movement. Denies fever/chills, cough, SOB, abdominal pain, N/V/D, trauma. Denies Etoh/drug use, does smoke 2-3 cigarettes per day.    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 States that he was released from rehab 4d ago to housing program, and has since had progressive worsening of his LE weakness, now "stumbling around", having great difficulty taking care of himself. Also endorses significant pain lower back/LEs worse with movement. Denies fever/chills, cough, SOB, abdominal pain, N/V/D, trauma. Denies Etoh/drug use, does smoke 2-3 cigarettes per day.  PMD: Easton Hunter  Attending - Agree with above except where differences noted.  I evaluated patient myself. 32 y/o M hx HIV, GBS with resulting b/l LE weakness and pain.  Admitted to [facility] 6/5/21-6/16/21.  Tab records reviewed.  Subsequently at [facility] in [place], and discharged from there to [facility] on Monday - a homeless residence.  States living with roommate.  To [facility] ED on Monday for continued LE pains.  Admitted for pneumonia per patient.  Discharged on Tuesday and prescriptions sent to [facility] with plan to increase Baclofen to TID from BID.  Also takes seroquel neurontin, and Biktarvy.  Patient states all meds were supposed to be delivered but were not and when he called pharmacy they needed additional insurance information.  LE pains continue so took train out to [place] and then EMS to come to [facility] as cared for here previously.  Denies fever, cough, shortness of breath, or CP.  Eating well.  No abd pain, n/v/d.

## 2021-09-10 ENCOUNTER — TRANSCRIPTION ENCOUNTER (OUTPATIENT)
Age: 31
End: 2021-09-10

## 2021-09-10 DIAGNOSIS — Z29.9 ENCOUNTER FOR PROPHYLACTIC MEASURES, UNSPECIFIED: ICD-10-CM

## 2021-09-10 DIAGNOSIS — G61.0 GUILLAIN-BARRE SYNDROME: ICD-10-CM

## 2021-09-10 DIAGNOSIS — B20 HUMAN IMMUNODEFICIENCY VIRUS [HIV] DISEASE: ICD-10-CM

## 2021-09-10 DIAGNOSIS — R29.898 OTHER SYMPTOMS AND SIGNS INVOLVING THE MUSCULOSKELETAL SYSTEM: ICD-10-CM

## 2021-09-10 LAB
24R-OH-CALCIDIOL SERPL-MCNC: 14.1 NG/ML — LOW (ref 30–80)
A1C WITH ESTIMATED AVERAGE GLUCOSE RESULT: 4.8 % — SIGNIFICANT CHANGE UP (ref 4–5.6)
ANION GAP SERPL CALC-SCNC: 16 MMOL/L — HIGH (ref 7–14)
BUN SERPL-MCNC: 16 MG/DL — SIGNIFICANT CHANGE UP (ref 7–23)
CALCIUM SERPL-MCNC: 9.2 MG/DL — SIGNIFICANT CHANGE UP (ref 8.4–10.5)
CHLORIDE SERPL-SCNC: 100 MMOL/L — SIGNIFICANT CHANGE UP (ref 98–107)
CHOLEST SERPL-MCNC: 148 MG/DL — SIGNIFICANT CHANGE UP
CK SERPL-CCNC: 918 U/L — HIGH (ref 30–200)
CO2 SERPL-SCNC: 22 MMOL/L — SIGNIFICANT CHANGE UP (ref 22–31)
COVID-19 SPIKE DOMAIN AB INTERP: POSITIVE
COVID-19 SPIKE DOMAIN ANTIBODY RESULT: 1.07 U/ML — HIGH
CREAT SERPL-MCNC: 0.93 MG/DL — SIGNIFICANT CHANGE UP (ref 0.5–1.3)
ESTIMATED AVERAGE GLUCOSE: 91 — SIGNIFICANT CHANGE UP
GLUCOSE SERPL-MCNC: 123 MG/DL — HIGH (ref 70–99)
HCT VFR BLD CALC: 39.8 % — SIGNIFICANT CHANGE UP (ref 39–50)
HDLC SERPL-MCNC: 28 MG/DL — LOW
HGB BLD-MCNC: 13.2 G/DL — SIGNIFICANT CHANGE UP (ref 13–17)
LIPID PNL WITH DIRECT LDL SERPL: 102 MG/DL — HIGH
MAGNESIUM SERPL-MCNC: 1.8 MG/DL — SIGNIFICANT CHANGE UP (ref 1.6–2.6)
MCHC RBC-ENTMCNC: 28.9 PG — SIGNIFICANT CHANGE UP (ref 27–34)
MCHC RBC-ENTMCNC: 33.2 GM/DL — SIGNIFICANT CHANGE UP (ref 32–36)
MCV RBC AUTO: 87.1 FL — SIGNIFICANT CHANGE UP (ref 80–100)
NON HDL CHOLESTEROL: 120 MG/DL — SIGNIFICANT CHANGE UP
NRBC # BLD: 0 /100 WBCS — SIGNIFICANT CHANGE UP
NRBC # FLD: 0 K/UL — SIGNIFICANT CHANGE UP
PHOSPHATE SERPL-MCNC: 3 MG/DL — SIGNIFICANT CHANGE UP (ref 2.5–4.5)
PLATELET # BLD AUTO: 248 K/UL — SIGNIFICANT CHANGE UP (ref 150–400)
POTASSIUM SERPL-MCNC: 3.2 MMOL/L — LOW (ref 3.5–5.3)
POTASSIUM SERPL-SCNC: 3.2 MMOL/L — LOW (ref 3.5–5.3)
RBC # BLD: 4.57 M/UL — SIGNIFICANT CHANGE UP (ref 4.2–5.8)
RBC # FLD: 12.8 % — SIGNIFICANT CHANGE UP (ref 10.3–14.5)
SARS-COV-2 IGG+IGM SERPL QL IA: 1.07 U/ML — HIGH
SARS-COV-2 IGG+IGM SERPL QL IA: POSITIVE
SODIUM SERPL-SCNC: 138 MMOL/L — SIGNIFICANT CHANGE UP (ref 135–145)
T4 AB SER-ACNC: 6.54 UG/DL — SIGNIFICANT CHANGE UP (ref 5.1–13)
TRIGL SERPL-MCNC: 88 MG/DL — SIGNIFICANT CHANGE UP
TSH SERPL-MCNC: 0.85 UIU/ML — SIGNIFICANT CHANGE UP (ref 0.27–4.2)
WBC # BLD: 4.06 K/UL — SIGNIFICANT CHANGE UP (ref 3.8–10.5)
WBC # FLD AUTO: 4.06 K/UL — SIGNIFICANT CHANGE UP (ref 3.8–10.5)

## 2021-09-10 PROCEDURE — 12345: CPT | Mod: NC,GC

## 2021-09-10 PROCEDURE — 99223 1ST HOSP IP/OBS HIGH 75: CPT | Mod: GC

## 2021-09-10 PROCEDURE — 99222 1ST HOSP IP/OBS MODERATE 55: CPT

## 2021-09-10 RX ORDER — BICTEGRAVIR SODIUM, EMTRICITABINE, AND TENOFOVIR ALAFENAMIDE FUMARATE 30; 120; 15 MG/1; MG/1; MG/1
1 TABLET ORAL DAILY
Refills: 0 | Status: DISCONTINUED | OUTPATIENT
Start: 2021-09-10 | End: 2021-09-17

## 2021-09-10 RX ORDER — LIDOCAINE 4 G/100G
1 CREAM TOPICAL DAILY
Refills: 0 | Status: DISCONTINUED | OUTPATIENT
Start: 2021-09-10 | End: 2021-09-17

## 2021-09-10 RX ORDER — ENOXAPARIN SODIUM 100 MG/ML
40 INJECTION SUBCUTANEOUS DAILY
Refills: 0 | Status: DISCONTINUED | OUTPATIENT
Start: 2021-09-10 | End: 2021-09-17

## 2021-09-10 RX ORDER — BACLOFEN 100 %
15 POWDER (GRAM) MISCELLANEOUS THREE TIMES A DAY
Refills: 0 | Status: DISCONTINUED | OUTPATIENT
Start: 2021-09-10 | End: 2021-09-15

## 2021-09-10 RX ORDER — CHOLECALCIFEROL (VITAMIN D3) 125 MCG
1000 CAPSULE ORAL DAILY
Refills: 0 | Status: DISCONTINUED | OUTPATIENT
Start: 2021-09-10 | End: 2021-09-17

## 2021-09-10 RX ORDER — GABAPENTIN 400 MG/1
300 CAPSULE ORAL THREE TIMES A DAY
Refills: 0 | Status: DISCONTINUED | OUTPATIENT
Start: 2021-09-10 | End: 2021-09-17

## 2021-09-10 RX ORDER — BACLOFEN 100 %
10 POWDER (GRAM) MISCELLANEOUS
Refills: 0 | Status: DISCONTINUED | OUTPATIENT
Start: 2021-09-10 | End: 2021-09-10

## 2021-09-10 RX ORDER — LANOLIN ALCOHOL/MO/W.PET/CERES
3 CREAM (GRAM) TOPICAL AT BEDTIME
Refills: 0 | Status: DISCONTINUED | OUTPATIENT
Start: 2021-09-10 | End: 2021-09-17

## 2021-09-10 RX ORDER — QUETIAPINE FUMARATE 200 MG/1
200 TABLET, FILM COATED ORAL AT BEDTIME
Refills: 0 | Status: DISCONTINUED | OUTPATIENT
Start: 2021-09-10 | End: 2021-09-17

## 2021-09-10 RX ORDER — LIDOCAINE 4 G/100G
1 CREAM TOPICAL DAILY
Refills: 0 | Status: DISCONTINUED | OUTPATIENT
Start: 2021-09-10 | End: 2021-09-10

## 2021-09-10 RX ORDER — SODIUM CHLORIDE 9 MG/ML
1000 INJECTION INTRAMUSCULAR; INTRAVENOUS; SUBCUTANEOUS
Refills: 0 | Status: DISCONTINUED | OUTPATIENT
Start: 2021-09-10 | End: 2021-09-11

## 2021-09-10 RX ORDER — POTASSIUM CHLORIDE 20 MEQ
40 PACKET (EA) ORAL EVERY 4 HOURS
Refills: 0 | Status: COMPLETED | OUTPATIENT
Start: 2021-09-10 | End: 2021-09-10

## 2021-09-10 RX ADMIN — QUETIAPINE FUMARATE 200 MILLIGRAM(S): 200 TABLET, FILM COATED ORAL at 23:03

## 2021-09-10 RX ADMIN — GABAPENTIN 300 MILLIGRAM(S): 400 CAPSULE ORAL at 13:05

## 2021-09-10 RX ADMIN — LIDOCAINE 1 PATCH: 4 CREAM TOPICAL at 18:47

## 2021-09-10 RX ADMIN — Medication 10 MILLIGRAM(S): at 06:52

## 2021-09-10 RX ADMIN — LIDOCAINE 1 PATCH: 4 CREAM TOPICAL at 08:02

## 2021-09-10 RX ADMIN — ENOXAPARIN SODIUM 40 MILLIGRAM(S): 100 INJECTION SUBCUTANEOUS at 11:41

## 2021-09-10 RX ADMIN — LIDOCAINE 1 PATCH: 4 CREAM TOPICAL at 20:50

## 2021-09-10 RX ADMIN — GABAPENTIN 300 MILLIGRAM(S): 400 CAPSULE ORAL at 22:48

## 2021-09-10 RX ADMIN — Medication 15 MILLIGRAM(S): at 13:05

## 2021-09-10 RX ADMIN — Medication 40 MILLIEQUIVALENT(S): at 13:06

## 2021-09-10 RX ADMIN — BICTEGRAVIR SODIUM, EMTRICITABINE, AND TENOFOVIR ALAFENAMIDE FUMARATE 1 TABLET(S): 30; 120; 15 TABLET ORAL at 11:41

## 2021-09-10 RX ADMIN — Medication 5 MILLIGRAM(S): at 00:36

## 2021-09-10 RX ADMIN — Medication 3 MILLIGRAM(S): at 22:47

## 2021-09-10 RX ADMIN — Medication 40 MILLIEQUIVALENT(S): at 10:41

## 2021-09-10 RX ADMIN — GABAPENTIN 300 MILLIGRAM(S): 400 CAPSULE ORAL at 06:51

## 2021-09-10 RX ADMIN — Medication 15 MILLIGRAM(S): at 23:03

## 2021-09-10 RX ADMIN — SODIUM CHLORIDE 100 MILLILITER(S): 9 INJECTION INTRAMUSCULAR; INTRAVENOUS; SUBCUTANEOUS at 10:40

## 2021-09-10 NOTE — OCCUPATIONAL THERAPY INITIAL EVALUATION ADULT - ADDITIONAL COMMENTS
Patient reports living in a shelter at this time. Prior to that, patient was in [place]. Patient reports using a cane for ambulation and performed ADLs independently, however, was difficult and needed help at times from "whoever is around".

## 2021-09-10 NOTE — DISCHARGE NOTE PROVIDER - NSDCCPCAREPLAN_GEN_ALL_CORE_FT
PRINCIPAL DISCHARGE DIAGNOSIS  Diagnosis: Lower extremity weakness  Assessment and Plan of Treatment:       SECONDARY DISCHARGE DIAGNOSES  Diagnosis: GBS (Guillain Melrose syndrome)  Assessment and Plan of Treatment:     Diagnosis: HIV disease  Assessment and Plan of Treatment:      PRINCIPAL DISCHARGE DIAGNOSIS  Diagnosis: Lower extremity weakness  Assessment and Plan of Treatment: You presented to the hospital with weakness of both legs, without any objective findings, recent trauma or falls. You also noted a few months of lower back pain and a few weeks of shooting pain down your legs. You have been admitted to [facility] in the past for similar symptoms. During your hospitalization, neurology was consulted, however no labwork or infectious work-up yielded a source of this pain. As a result, it was believed that the worsening weakness was due to deconditioning as opposed to your previous diagnosis of GBS or possibly Myasthenia Gravis. As a result, you will be discharged to rehab in order to improve your strength.  If you experience similar symptoms (worsening LE weakness, trouble breathing, paralysis of your trunk/arms), return to the hospital for additional care and treatment.      SECONDARY DISCHARGE DIAGNOSES  Diagnosis: GBS (Guillain Bertrand syndrome)  Assessment and Plan of Treatment: You have previously been diagnosed with Guillain Bertrand Syndrome (GBS), which results in ascending paralysis of your body, and possibly life-threatening paralysis of your diaphragm (resulting in inability to breath). Continue to take your medications as prescribed (baclofen, gabapentin). Follow-up with your OP physician as needed.    Diagnosis: HIV disease  Assessment and Plan of Treatment: While in the hospital, you continued to receive your HIV medication - Biktarvy. It is important that you take this medication as prescribed and do not miss any doses if possible, as this may lead to viral resistance and inefficacy of the medication. Your OP infectious disease doctor, Dr. Easton Hunter, was notified that you were in the hospital.   An appointment was made for 9/29 at 3:15pm at [facility] to follow-up with Dr. Hunter (please see follow-up section for more details).

## 2021-09-10 NOTE — OCCUPATIONAL THERAPY INITIAL EVALUATION ADULT - PERTINENT HX OF CURRENT PROBLEM, REHAB EVAL
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 Negative imaging in 5/2021 likely 2/2 deconditioning vs less likely worsening of GBS vs. MG

## 2021-09-10 NOTE — H&P ADULT - NSHPPHYSICALEXAM_GEN_ALL_CORE
VITALS:   T(C): 36.4 (09-10-21 @ 02:43), Max: 36.7 (09-10-21 @ 01:12)  HR: 63 (09-10-21 @ 02:43) (63 - 73)  BP: 123/68 (09-10-21 @ 02:43) (123/68 - 140/78)  RR: 17 (09-10-21 @ 02:43) (16 - 17)  SpO2: 99% (09-10-21 @ 02:43) (99% - 100%)    GENERAL: NAD, sleeping in bed  EYES: EOMI, conjunctiva and sclera clear  ENT: Moist mucous membranes  CHEST/LUNG: breathing on RA; Speaking in full sentences; normal WOB; Clear to auscultation bilaterally; No rales, rhonchi, wheezing, or rubs  HEART: Regular rate and rhythm; No murmurs  ABDOMEN: BSx4; Soft, nontender, nondistended  EXTREMITIES:  2+ Peripheral Pulses, brisk capillary refill. No cyanosis or edema; L-spine w/o midline or paraspinal tenderness; no bruising or signs of trauma  NERVOUS SYSTEM:  A&Ox3, 5/5 strength in b/l UE; 4/5 strength in b/l LE; sensation intact  SKIN: No rashes or lesions  Psych: Normal speech, normal behavior, normal affect

## 2021-09-10 NOTE — H&P ADULT - HISTORY OF PRESENT ILLNESS
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 Denies any fevers, chills, trauma, or falls. Pt at baseline walks w/walker and cane for ambulation; and is adherent to medications. Denies any  associated symptoms of fevers, or chills. Ambulates w/walker at baseline.    Per chart review, pt was admitted 5/2021 for LE weakness at which point he had extensive imaging including MRI C/T/L-spine, which showed no acute concerning findings.    In the ED, pt afebrile, HR 60-70s, -140s, SaO2 99 RA. S/p baclofen and gabapentin

## 2021-09-10 NOTE — PROGRESS NOTE ADULT - PROBLEM SELECTOR PLAN 2
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

## 2021-09-10 NOTE — OCCUPATIONAL THERAPY INITIAL EVALUATION ADULT - MD/RN NOTIFIED
Pt is in the Precontemplation stage (ie, not wanting to quit)  - Educate on the negative effects of Tobaco   - Recommend quitting smoking  - Listed cessation options yes

## 2021-09-10 NOTE — DISCHARGE NOTE PROVIDER - HOSPITAL COURSE
31M PMH HIV on Biktarvy and GBS w/residual LE weakness coming in from a Shelter in [place] for worsening LE weakness associated w/worsening constant, low back pain over the past several months; pt notes the pain is associated with muscle spasms and intermittently shoots down his legs, which is new over the past ~1 week or so. Denies any fevers, chills, trauma, or falls. Pt at baseline walks w/walker and cane for ambulation; he reports he is adherent to medications. He recently presented to [facility] for similar symptoms, reports he was admitted for 1 day and was d/c'd to shelter. Reports at [facility] there was consideration of performing MRI which was subsequently not pursued. Per patient he was discharged on a higher dose of Baclofen (15 TID up from 10 BID) which he was not able to obtain from pharmacy due to insurance issues.    Per chart review, pt was admitted 5/2021 for LE weakness at which point he had extensive imaging including MRI C/T/L-spine, which showed no acute concerning findings.    In the ED, pt afebrile, HR 60-70s, -140s, SaO2 99 RA. S/p baclofen and gabapentin. Patient evaluated by neurology who did not feel his symptoms to be related to a GBS complicated. Recommended MR brain, C-Spine, T-Spine, and L-Spine. Myasthenia workup was pursued. 31M PMH HIV on Biktarvy and GBS w/residual LE weakness coming in from a Shelter in [place] for worsening LE weakness associated w/worsening constant, low back pain over the past several months; pt notes the pain is associated with muscle spasms and intermittently shoots down his legs, which is new over the past ~1 week or so. Denies any fevers, chills, trauma, or falls. Pt at baseline walks w/walker and cane for ambulation; he reports he is adherent to medications. He recently presented to [facility] for similar symptoms, reports he was admitted for 1 day and was d/c'd to shelter. Reports at [facility] there was consideration of performing MRI which was subsequently not pursued. Per patient he was discharged on a higher dose of Baclofen (15 TID up from 10 BID) which he was not able to obtain from pharmacy due to insurance issues.    Per chart review, pt was admitted 5/2021 for LE weakness at which point he had extensive imaging including MRI C/T/L-spine, which showed no acute concerning findings.    In the ED, pt afebrile, HR 60-70s, -140s, SpO2 99 RA. S/p baclofen and gabapentin. On the floor, patient evaluated by neurology who did not feel his symptoms to be related to a GBS complication but rather due to deconditioning. Myasthenia workup was pursued and the patient continued to receive Biktarvy, baclofen and gabapentin as well as Lovenox for DVT prophylaxis. PM&R consult called as well. Patient continued to experience episodes of spasms and weakness, could walk to the bathroom assisted but with difficulty. Serum workup returned within normal limits. CD4 to CD8 ratio was low, and the patient's ID physician Dr. Easton Hunter was called. He advised continuation of Biktarvy and outpatient follow-up with him at [address] [place] [address]. Throughout the hospital stay the patient remained afebrile, normotensive with normal pulse and SpO2. PT continued to visit and work with the patient, who is expected to be discharged to an acute rehab facility for further treatment.   31M PMH HIV on Biktarvy and GBS w/residual LE weakness coming in from a Shelter in [place] for worsening LE weakness associated w/worsening constant, low back pain over the past several months; pt notes the pain is associated with muscle spasms and intermittently shoots down his legs, which is new over the past ~1 week or so. Denies any fevers, chills, trauma, or falls. Pt at baseline walks w/walker and cane for ambulation; he reports he is adherent to medications. He recently presented to [facility] for similar symptoms, reports he was admitted for 1 day and was d/c'd to shelter. Reports at [facility] there was consideration of performing MRI which was subsequently not pursued. Per patient he was discharged on a higher dose of Baclofen (15 TID up from 10 BID) which he was not able to obtain from pharmacy due to insurance issues.    Per chart review, pt was admitted 5/2021 for LE weakness at which point he had extensive imaging including MRI C/T/L-spine, which showed no acute concerning findings.    In the ED, pt afebrile, HR 60-70s, -140s, SpO2 99 RA. S/p baclofen and gabapentin. On the floor, patient evaluated by neurology who did not feel his symptoms to be related to a GBS complication but rather due to deconditioning. Myasthenia workup was pursued and the patient continued to receive Biktarvy, baclofen and gabapentin as well as Lovenox for DVT prophylaxis. PM&R consult called as well. Patient continued to experience episodes of spasms and weakness, could walk to the bathroom assisted but with difficulty. Serum workup returned within normal limits. CD4 to CD8 ratio was low, and the patient's ID physician Dr. Easton Hunter was called. He advised continuation of Biktarvy and outpatient follow-up with him at [address] [place] [address]. Throughout the hospital stay the patient remained afebrile, normotensive with normal pulse and SpO2. PT continued to visit and work with the patient, who is expected to be discharged to an acute rehab facility for further treatment. Patient Covid Negative prior to d/c.     As of 9/15/21, patient is medically optimized for discharge.    31M PMH HIV on Biktarvy and GBS w/residual LE weakness coming in from a Shelter in [place] for worsening LE weakness associated w/worsening constant, low back pain over the past several months; pt notes the pain is associated with muscle spasms and intermittently shoots down his legs, which is new over the past ~1 week or so. Denies any fevers, chills, trauma, or falls. Pt at baseline walks w/walker and cane for ambulation; he reports he is adherent to medications. He recently presented to [facility] for similar symptoms, reports he was admitted for 1 day and was d/c'd to shelter. Reports at [facility] there was consideration of performing MRI which was subsequently not pursued. Per patient he was discharged on a higher dose of Baclofen (15 TID up from 10 BID) which he was not able to obtain from pharmacy due to insurance issues.    Per chart review, pt was admitted 5/2021 for LE weakness at which point he had extensive imaging including MRI C/T/L-spine, which showed no acute concerning findings.    In the ED, pt afebrile, HR 60-70s, -140s, SpO2 99 RA. S/p baclofen and gabapentin. On the floor, patient evaluated by neurology who did not feel his symptoms to be related to a GBS complication but rather due to deconditioning. Myasthenia workup was pursued and the patient continued to receive Biktarvy, baclofen and gabapentin as well as Lovenox for DVT prophylaxis. PM&R consult called as well. Patient continued to experience episodes of spasms and weakness, could walk to the bathroom assisted but with difficulty. Serum workup returned within normal limits. CD4 to CD8 ratio was low, and the patient's ID physician Dr. Easton Hunter was called. He advised continuation of Biktarvy and outpatient follow-up with him at [address] [place] [address]. Throughout the hospital stay the patient remained afebrile, normotensive with normal pulse and SpO2. PT continued to visit and work with the patient, who is to be discharged to a rehab facility for further treatment.    At this time, patient is medically optimized for discharge.

## 2021-09-10 NOTE — DISCHARGE NOTE PROVIDER - NSDCFUSCHEDAPPT_GEN_ALL_CORE_FT
NELSON MITCHELL ; 09/29/2021 ; NPP Med  Comm NELSON Johnson ; 09/29/2021 ; NPP Med  Comm NELSON Johnson ; 09/29/2021 ; NPP Med  Comm

## 2021-09-10 NOTE — PHYSICAL THERAPY INITIAL EVALUATION ADULT - ADDITIONAL COMMENTS
Pt. reports owning a straight cane and rolling walker.     Pt. was left in bed post PT Evaluation, no apparent distress, all lines intact, call bell within reach.

## 2021-09-10 NOTE — H&P ADULT - PROBLEM SELECTOR PLAN 2
congenital HIV on Biktarvy  - unknown viral load, CD4 count  - repeat viral load and CD4 count in AM

## 2021-09-10 NOTE — H&P ADULT - NSHPSOCIALHISTORY_GEN_ALL_CORE
tobacco use denies  alcohol use denies  drug use denies; per chart review, pt has been admitted in the past for cocaine intoxication and needing intubation at 1 point
yes

## 2021-09-10 NOTE — H&P ADULT - NSHPREVIEWOFSYSTEMS_GEN_ALL_CORE
REVIEW OF SYSTEMS:    CONSTITUTIONAL: No weakness, fevers or chills  EYES: No visual changes, blurry vision  ENT: No throat pain ; No rhinorrhea  NECK: No pain or stiffness  RESPIRATORY: No cough, wheezing; No shortness of breath  CARDIOVASCULAR: No chest pain or palpitations  GASTROINTESTINAL: No abdominal or epigastric pain. No nausea, vomiting, or hematemesis; No diarrhea or constipation. No melena or hematochezia.  GENITOURINARY: No dysuria, frequency or hematuria  MSK: (+) back pain and LE weakness  NEUROLOGICAL: No numbness or weakness  SKIN: No itching, rashes  Psychiatric: No anxiety, depression, SI

## 2021-09-10 NOTE — PROGRESS NOTE ADULT - PROBLEM SELECTOR PLAN 1
- subjective b/l LE weakness without objective findings associated w/lower back pain over the past several months; multiple previous admissions for similar presentation. labs without electrolyte derangement; no signs of infection on labs or subjective; previous MR C/T/L spine 5/2021 and 6/2021 without acute findings; no trauma, falls, or worsening in LE weakness since these previous imaging studies  - neuro following; recommending MR head, C/T spine inpatient or outpatient; pending d/w neuro attending  - f/u MG w/u as pt w/ptosis; f/u TSH, MUSK, acHR-antibodies. Further serologic w/u per neuro: SHAI, lipid panel, a1c

## 2021-09-10 NOTE — H&P ADULT - NSHPLABSRESULTS_GEN_ALL_CORE
LABS:                          13.9   5.08  )-----------( 256      ( 09 Sep 2021 19:28 )             41.6     09-09    139  |  102  |  19  ----------------------------<  87  4.5   |  21<L>  |  1.00    Ca    9.6      09 Sep 2021 19:28  Phos  3.4     09-09  Mg     1.80     09-09    TPro  8.1  /  Alb  4.7  /  TBili  0.6  /  DBili  x   /  AST  51<H>  /  ALT  20  /  AlkPhos  74  09-09    LIVER FUNCTIONS - ( 09 Sep 2021 19:28 )  Alb: 4.7 g/dL / Pro: 8.1 g/dL / ALK PHOS: 74 U/L / ALT: 20 U/L / AST: 51 U/L / GGT: x               < from: Xray Chest 2 Views PA/Lat (09.09.21 @ 19:34) >      FINDINGS:    Lungs are clear. No pleural effusion or pneumothorax. Heart size is normal. Bones and soft tissues are unremarkable.    < end of copied text >    EKG: NSR 66; normal axis; normal intervals; no ST elevations or depressions  QTc 436

## 2021-09-10 NOTE — PHYSICAL THERAPY INITIAL EVALUATION ADULT - ASSISTIVE DEVICE:SUPINE/SIT, REHAB EVAL
Preventive Health Recommendations:   Male Ages 65 and over    Yearly exam:             See your health care provider every year in order to  o   Review health changes.   o   Discuss preventive care.    o   Review your medicines if your doctor has prescribed any.    Talk with your health care provider about whether you should have a test to screen for prostate cancer (PSA).    Every 3 years, have a diabetes test (fasting glucose). If you are at risk for diabetes, you should have this test more often.    Every 5 years, have a cholesterol test. Have this test more often if you are at risk for high cholesterol or heart disease.     Every 10 years, have a colonoscopy. Or, have a yearly FIT test (stool test). These exams will check for colon cancer.    Talk to with your health care provider about screening for Abdominal Aortic Aneurysm if you have a family history of AAA or have a history of smoking.    Shots:     Get a flu shot each year.     Get a tetanus shot every 10 years.     Talk to your doctor about your pneumonia vaccines. There are now two you should receive - Pneumovax (PPSV 23) and Prevnar (PCV 13).     Talk to your doctor about a shingles vaccine.     Talk to your doctor about the hepatitis B vaccine.  Nutrition:     Eat at least 5 servings of fruits and vegetables each day.     Eat whole-grain bread, whole-wheat pasta and brown rice instead of white grains and rice.     Talk to your provider about Calcium and Vitamin D.   Lifestyle    Exercise for at least 150 minutes a week (30 minutes a day, 5 days a week). This will help you control your weight and prevent disease.     Limit alcohol to one drink per day.     No smoking.     Wear sunscreen to prevent skin cancer.     See your dentist every six months for an exam and cleaning.     See your eye doctor every 1 to 2 years to screen for conditions such as glaucoma, macular degeneration, cataracts, etc    bed rails

## 2021-09-10 NOTE — OCCUPATIONAL THERAPY INITIAL EVALUATION ADULT - PLANNED THERAPY INTERVENTIONS, OT EVAL
ADL retraining/balance training/bed mobility training/neuromuscular re-education/ROM/strengthening/transfer training

## 2021-09-10 NOTE — PROGRESS NOTE ADULT - SUBJECTIVE AND OBJECTIVE BOX
August Ren MD PGY3  Pager: [phone] JOSEFINA, [phone] [facility]  After 7: Night Float pager    Patient is a 31y old  Male who presents with a chief complaint of LE weakness (10 Sep 2021 04:46)      SUBJECTIVE / OVERNIGHT EVENTS: Overnight, no acute events. Patient seen and examined at bedside this AM. Endorses b/l LE weakness.    MEDICATIONS  (STANDING):  baclofen 10 milliGRAM(s) Oral two times a day  bictegravir 50 mG/emtricitabine 200 mG/tenofovir alafenamide 25 mG (BIKTARVY) 1 Tablet(s) Oral daily  gabapentin 300 milliGRAM(s) Oral three times a day  lidocaine   4% Patch 1 Patch Transdermal daily  QUEtiapine 200 milliGRAM(s) Oral at bedtime    MEDICATIONS  (PRN):  melatonin 3 milliGRAM(s) Oral at bedtime PRN Insomnia      Vital Signs Last 24 Hrs  T(C): 36.8 (10 Sep 2021 06:50), Max: 36.8 (10 Sep 2021 06:50)  T(F): 98.2 (10 Sep 2021 06:50), Max: 98.2 (10 Sep 2021 06:50)  HR: 62 (10 Sep 2021 06:50) (62 - 73)  BP: 124/89 (10 Sep 2021 06:50) (123/68 - 140/78)  BP(mean): --  RR: 18 (10 Sep 2021 06:50) (16 - 18)  SpO2: 99% (10 Sep 2021 06:50) (99% - 100%)  CAPILLARY BLOOD GLUCOSE        I&O's Summary      PHYSICAL EXAM:  GENERAL: NAD  EYES: EOMI, PERRLA, conjunctiva and sclera clear  NECK:  No JVD  CHEST/LUNG: CTABL ; No wheeze  HEART: RRR; No murmurs  ABDOMEN: Soft, Nontender, Nondistended; Bowel sounds present  : No Brizuela  EXTREMITIES:  2+ Peripheral Pulses, No edema  PSYCH: AAOx3  NEUROLOGY: b/l LE weakness 1-2/5. b/l Ptosis. CN II-XII intact.   SKIN: No rashes or lesions. No sacral ulcer    LABS:                        13.9   5.08  )-----------( 256      ( 09 Sep 2021 19:28 )             41.6     09-09    139  |  102  |  19  ----------------------------<  87  4.5   |  21<L>  |  1.00    Ca    9.6      09 Sep 2021 19:28  Phos  3.4     09-09  Mg     1.80     09-09    TPro  8.1  /  Alb  4.7  /  TBili  0.6  /  DBili  x   /  AST  51<H>  /  ALT  20  /  AlkPhos  74  09-09              Microbiology;        RADIOLOGY & ADDITIONAL TESTS:    Imaging Personally Reviewed:          Consultant(s) Notes Reviewed:      Care Discussed with Consultants/Other Providers:       August Ren MD PGY3  Pager: [phone] JOSEFINA, [phone] [facility]  After 7: Night Float pager    Patient is a 31y old  Male who presents with a chief complaint of LE weakness (10 Sep 2021 04:46)      SUBJECTIVE / OVERNIGHT EVENTS: Overnight, no acute events. Patient seen and examined at bedside this AM. Endorses b/l LE weakness.    MEDICATIONS  (STANDING):  baclofen 10 milliGRAM(s) Oral two times a day  bictegravir 50 mG/emtricitabine 200 mG/tenofovir alafenamide 25 mG (BIKTARVY) 1 Tablet(s) Oral daily  gabapentin 300 milliGRAM(s) Oral three times a day  lidocaine   4% Patch 1 Patch Transdermal daily  QUEtiapine 200 milliGRAM(s) Oral at bedtime    MEDICATIONS  (PRN):  melatonin 3 milliGRAM(s) Oral at bedtime PRN Insomnia      Vital Signs Last 24 Hrs  T(C): 36.8 (10 Sep 2021 06:50), Max: 36.8 (10 Sep 2021 06:50)  T(F): 98.2 (10 Sep 2021 06:50), Max: 98.2 (10 Sep 2021 06:50)  HR: 62 (10 Sep 2021 06:50) (62 - 73)  BP: 124/89 (10 Sep 2021 06:50) (123/68 - 140/78)  BP(mean): --  RR: 18 (10 Sep 2021 06:50) (16 - 18)  SpO2: 99% (10 Sep 2021 06:50) (99% - 100%)  CAPILLARY BLOOD GLUCOSE        I&O's Summary      PHYSICAL EXAM:  GENERAL: NAD  EYES: EOMI, PERRLA, conjunctiva and sclera clear  NECK:  No JVD  CHEST/LUNG: CTABL ; No wheeze  HEART: RRR; No murmurs  ABDOMEN: Soft, Nontender, Nondistended; Bowel sounds present  : No Brizuela  EXTREMITIES:  2+ Peripheral Pulses, No edema  PSYCH: AAOx3  NEUROLOGY: b/l LE weakness 1-2/5. b/l Ptosis. CN II-XII intact. +hyperreflexia  SKIN: No rashes or lesions. No sacral ulcer    LABS:                        13.9   5.08  )-----------( 256      ( 09 Sep 2021 19:28 )             41.6     09-09    139  |  102  |  19  ----------------------------<  87  4.5   |  21<L>  |  1.00    Ca    9.6      09 Sep 2021 19:28  Phos  3.4     09-09  Mg     1.80     09-09    TPro  8.1  /  Alb  4.7  /  TBili  0.6  /  DBili  x   /  AST  51<H>  /  ALT  20  /  AlkPhos  74  09-09              Microbiology;        RADIOLOGY & ADDITIONAL TESTS:    Imaging Personally Reviewed:          Consultant(s) Notes Reviewed:      Care Discussed with Consultants/Other Providers:

## 2021-09-10 NOTE — H&P ADULT - ASSESSMENT
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 MG

## 2021-09-10 NOTE — PROGRESS NOTE ADULT - ASSESSMENT
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 MG

## 2021-09-10 NOTE — CONSULT NOTE ADULT - ASSESSMENT
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 Neurological examination appears at baseline with non-fatiguing mild-moderate b/l ptosis that patient believes developed over last few months. No neuroimaging at this time.     Impression: non-fatiguing mild-moderate b/l ptosis with subjective sense of progressive weakness 2/2 ddx including: rule out myasthenia gravis, rule out myelitis and other infectious/inflammatory underlying processes causing patient's symptoms    Plan:  Imaging/Studies:   [ ] MRI head and c & t spine w/w/o contrast as inpatient or outpatient    Labs:   [ ] a1c, lipid profile, TSH, SHAI, T4 full subset, CPK, binding/blocking/modulating anti-AChR antibodies, MUSK antibody    Consults:  SW/CM consult given patient does not feel safe at current nursing home and does not feel safe leaving hospital    Patient may follow up with Neurology Resident Clinic  [address] [place] [address]  [phone]    -Management & disposition to be d/w Neurology Attending Dr. 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 Neurological examination appears at baseline with non-fatiguing mild-moderate b/l ptosis that patient believes developed over last few months. No neuroimaging at this time.     Impression: non-fatiguing mild-moderate b/l ptosis with subjective sense of progressive weakness 2/2 ddx including: rule out myasthenia gravis, rule out myelitis and other infectious/inflammatory underlying processes causing patient's symptoms    Plan:  Imaging/Studies:   [ ] MRI head and c & t spine w/w/o contrast as inpatient or outpatient  [SEE ATTENDING ADDENDUM]    Labs:   [ ] a1c, lipid profile, TSH, SHAI, T4 full subset, CPK, binding/blocking/modulating anti-AChR antibodies, MUSK antibody    Consults:  SW/CM consult given patient does not feel safe at current nursing home and does not feel safe leaving hospital    Patient may follow up with Neurology Resident Clinic    [address] [facility]  [phone]    -Management & disposition to be d/w Neurology Attending Dr. Wilks        NEUROLOGY ATTENDING ADDENDUM    I personally interviewed, examined, and participated in the care of this patient, on rounds 9/10/21 with the neurology consult service team.  Reviewed findings and management plan with them and beaver team.  In addition to or instead of the Hx and findings and plan reported above, or in particular, I note as follows:    Pt relates that in 2018 one night getting OOB he noted weakness in the LEs and limped when walking.  The next morning he couldn't walk, fell trying to get to the bathroom, felt embarassed.  After calling his father on the phone he was taken to [facility] from which he was sent to [place] with weakness and spasms.  Was given Dx of GBS. Had an LP and was told results were negative.  Has had involuntary spasms ever since and has been on baclofen.  From [place] was sent to acute rehab where he had two successive 30-day admissions.  He says he eventually covered 50% of strength.  He continue to have spasms to this day.      On 3/25/20, with C/O worsened LLE weakness, an NIHSS score of 2, and a Dx of possible CVA he was given rTPA.  I did not find documentation of examination of reflexes.  CT head, CTAs of braon and neck, and CTP were all negative.  Per report of MR brain at that time:  "IMPRESSION:    1)  no diffusion restriction or MR evidence of acute ischemia. Periventricular white matter findings raise the possibility of an underlying inflammatory etiology and/or demyelinating disease. Further workup and assessment recommended..  2)  mucosal thickening noted in the sinuses. Mastoids are clear.."    MR c-spine wo/w on 5/21/21:  "IMPRESSION:  Motion degraded exam.    Mild spondylitic changes.    No spinal cord or intraspinal abnormality identified.    Prominent nasopharyngeal soft tissue likely representing adenoidal hypertrophy. Correlation with direct visualization is advised."    No significant findings on three MRs of L spine May through June 2021.  No significant findings on MR T spine Oct 2020.        On examination now:    Awake, alert.  Normal comprehension, expression, prosody, articulation. Anxious.  PERRL; EOMI; fields intact.      Medium to medium heavy muscular build.  Quadriparetic, weaker on the L side, with markedly greater weakness in the LEs than the UEs.  Examiner cannot passively dorsiflex either foot at the ankle beyond neutral (90 degee) position.  This indicates he has bilateral longstanding contractures of the gastrocs.  Without much effort, plantar felxion can be blocked, and the quads can be "broken."  Thumb forefinger pinch is quite weak bilaterally.  He has relatively better strength in the biceps and triceps but is well below 50% of normal for age and habitus.      Reflex                           Right    Left   Comment    Jaw jerk                             absent  Scapulohumeral               0        0  Pectoralis                        0       0  Biceps                             2       2  Triceps                           2-      2-  Brachiorad                     2+     2+  Fing flex                          0      0  Mclain                    absent absent   Hip add                         2-     1  Patellar                          3+    3+  Gastroc                          3      3     + 2-3 beats clonus each side  Plantar                extensor++  extensor   + clonus each side    P/LT grossly intact.      DISCUSSION and RECOMMENDATIONS    The Hx is incompatible with the reported Dx of GBS.    He may well not have had a stroke, or TIA.     Spastic quadriparesis.    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 ED for progressive weakness with Neurology consult for management of baclofen. Neurological examination appears at baseline with non-fatiguing mild-moderate b/l ptosis that patient believes developed over last few months. No neuroimaging at this time.     Impression: non-fatiguing mild-moderate b/l ptosis with subjective sense of progressive weakness 2/2 ddx including: rule out myasthenia gravis, rule out myelitis and other infectious/inflammatory underlying processes causing patient's symptoms    Plan:  Imaging/Studies:   [ ] MRI head and c & t spine w/w/o contrast as inpatient or outpatient  [SEE ATTENDING ADDENDUM]    Labs:   [ ] a1c, lipid profile, TSH, SHAI, T4 full subset, CPK, binding/blocking/modulating anti-AChR antibodies, MUSK antibody    Consults:  SW/CM consult given patient does not feel safe at current nursing home and does not feel safe leaving hospital    Patient may follow up with Neurology Resident Clinic    [address] [facility]  [phone]    -Management & disposition to be d/w Neurology Attending Dr. Wilks        NEUROLOGY ATTENDING ADDENDUM    I personally interviewed, examined, and participated in the care of this patient, on rounds 9/10/21 with the neurology consult service team.  Reviewed findings and management plan with them and beaver team.  In addition to or instead of the Hx and findings and plan reported above, or in particular, I note as follows:    Pt relates that in 2018 one night getting OOB he noted weakness in the LEs and limped when walking.  The next morning he couldn't walk, fell trying to get to the bathroom, felt embarassed.  After calling his father on the phone he was taken to [facility] from which he was sent to [place] with weakness and spasms.  Was given Dx of GBS. Had an LP and was told results were negative.  Has had involuntary spasms ever since and has been on baclofen.  From [place] was sent to acute rehab where he had two successive 30-day admissions.  He says he eventually covered 50% of strength.  He continue to have spasms to this day.      On 3/25/20, with C/O worsened LLE weakness, an NIHSS score of 2, and a Dx of possible CVA he was given rTPA.  I did not find documentation of examination of reflexes.  CT head, CTAs of braon and neck, and CTP were all negative.  Per report of MR brain at that time:  "IMPRESSION:    1)  no diffusion restriction or MR evidence of acute ischemia. Periventricular white matter findings raise the possibility of an underlying inflammatory etiology and/or demyelinating disease. Further workup and assessment recommended..  2)  mucosal thickening noted in the sinuses. Mastoids are clear.."    MR c-spine wo/w on 5/21/21:  "IMPRESSION:  Motion degraded exam.    Mild spondylitic changes.    No spinal cord or intraspinal abnormality identified.    Prominent nasopharyngeal soft tissue likely representing adenoidal hypertrophy. Correlation with direct visualization is advised."    No significant findings on three MRs of L spine May through June 2021.  No significant findings on MR T spine Oct 2020.      HIV viral loads: 75k Oct 2020, 37k May 2021, 7525 on 6/5/21.       On examination now:    Awake, alert.  Normal comprehension, expression, prosody, articulation. Anxious.  PERRL; EOMI; fields intact.      Medium to medium heavy muscular build.  Quadriparetic, weaker on the L side, with markedly greater weakness in the LEs than the UEs.  Examiner cannot passively dorsiflex either foot at the ankle beyond neutral (90 degee) position.  This indicates he has bilateral longstanding contractures of the gastrocs.  Without much effort, plantar felxion can be blocked, and the quads can be "broken."  Thumb forefinger pinch is quite weak bilaterally.  He has relatively better strength in the biceps and triceps but is well below 50% of normal for age and habitus.      Reflex                           Right    Left   Comment    Jaw jerk                             absent  Scapulohumeral               0        0  Pectoralis                        0       0  Biceps                             2       2  Triceps                           2-      2-  Brachiorad                     2+     2+  Fing flex                          0      0  Mclain                    absent absent   Hip add                         2-     1  Patellar                          3+    3+  Gastroc                          3      3     + 2-3 beats clonus each side  Plantar                extensor++  extensor   + clonus each side    P/LT grossly intact.      DISCUSSION and RECOMMENDATIONS    The Hx is incompatible with the reported Dx of GBS.  The exam findings are inconsistent with the diagnosis.    He may well not have had a stroke, or TIA.     Spastic quadriparesis.       Cervical myelopathy.      PRIME NEUROLOGIC DIAGNOSIS; HIV myelopathy. R/O other medical, non-structural, causes for myelopathy.      RECOMMENDATIONS (in lieu of the above):    No neuroimaging studies indicated at his time.    If not done very recently, pleas order:    B12, folate, methylmalonic acid, homocysteine, ESR, CRP, SPEP, serum immunofixation, syphilis serology, Cu, Zn, SHAI, HIV viral load.    The assay for B12 does not measure B12 level directly.  False elevations (to or above the normal range) occur with pernicious anemia due to intrinsic factor antibodies, which may or may not be detected by the antibody test.  B12-like compounds of vegetable origin (a problem with vegans) without cobalamin activity also can lead to falsely elevated determinations.  Methylmalonic acid (MMA) and homocysteine (Hcy) determinations are necessary to elucidate what is happening.    Irrespective of B12 and folate levels:       high Hcy w normal MMA implies folate deficiency;        high MMA and Hcy implies B12 deficiency +/- folate deficiency.    Consider if antiretroviral Rx needs adjustment.   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 Neurological examination appears at baseline with non-fatiguing mild-moderate b/l ptosis that patient believes developed over last few months. No neuroimaging at this time.     Impression: non-fatiguing mild-moderate b/l ptosis with subjective sense of progressive weakness 2/2 ddx including: rule out myasthenia gravis, rule out myelitis and other infectious/inflammatory underlying processes causing patient's symptoms    Plan:  Imaging/Studies:   [ ] MRI head and c & t spine w/w/o contrast as inpatient or outpatient  [SEE ATTENDING ADDENDUM]    Labs:   [ ] a1c, lipid profile, TSH, SHAI, T4 full subset, CPK, binding/blocking/modulating anti-AChR antibodies, MUSK antibody    Consults:  SW/CM consult given patient does not feel safe at current nursing home and does not feel safe leaving hospital    Patient may follow up with Neurology Resident Clinic    [facility]  [phone]    -Management & disposition to be d/w Neurology Attending Dr. Wilks        NEUROLOGY ATTENDING ADDENDUM    I personally interviewed, examined, and participated in the care of this patient, on rounds 9/10/21 with the neurology consult service team.  Reviewed findings and management plan with them and beaver team.  In addition to or instead of the Hx and findings and plan reported above, or in particular, I note as follows:    Pt relates that in 2018 one night getting OOB he noted weakness in the LEs and limped when walking.  The next morning he couldn't walk, fell trying to get to the bathroom, felt embarassed.  After calling his father on the phone he was taken to [facility] from which he was sent to [place] with weakness and spasms.  Was given Dx of GBS. Had an LP and was told results were negative.  Has had involuntary spasms ever since and has been on baclofen.  From [place] was sent to acute rehab where he had two successive 30-day admissions.  He says he eventually covered 50% of strength.  He continue to have spasms to this day.      On 3/25/20, with C/O worsened LLE weakness, an NIHSS score of 2, and a Dx of possible CVA he was given rTPA.  I did not find documentation of examination of reflexes.  CT head, CTAs of braon and neck, and CTP were all negative.  Per report of MR brain at that time:  "IMPRESSION:    1)  no diffusion restriction or MR evidence of acute ischemia. Periventricular white matter findings raise the possibility of an underlying inflammatory etiology and/or demyelinating disease. Further workup and assessment recommended..  2)  mucosal thickening noted in the sinuses. Mastoids are clear.."    MR c-spine wo/w on 5/21/21:  "IMPRESSION:  Motion degraded exam.    Mild spondylitic changes.    No spinal cord or intraspinal abnormality identified.    Prominent nasopharyngeal soft tissue likely representing adenoidal hypertrophy. Correlation with direct visualization is advised."    No significant findings on three MRs of L spine May through June 2021.  No significant findings on MR T spine Oct 2020.      HIV viral loads: 75k Oct 2020, 37k May 2021, 7525 on 6/5/21.  I note CD4 counts of <200.        On examination now:    Awake, alert.  Normal comprehension, expression, prosody, articulation. Anxious.  PERRL; EOMI; fields intact.      Medium to medium heavy muscular build.  Quadriparetic, weaker on the L side, with markedly greater weakness in the LEs than the UEs.  Examiner cannot passively dorsiflex either foot at the ankle beyond neutral (90 degee) position.  This indicates he has bilateral longstanding contractures of the gastrocs.  Without much effort, plantar felxion can be blocked, and the quads can be "broken."  Thumb forefinger pinch is quite weak bilaterally.  He has relatively better strength in the biceps and triceps but is well below 50% of normal for age and habitus.      Reflex                           Right    Left   Comment    Jaw jerk                             absent  Scapulohumeral               0        0  Pectoralis                        0       0  Biceps                             2       2  Triceps                           2-      2-  Brachiorad                     2+     2+  Fing flex                          0      0  Mclain                    absent absent   Hip add                         2-     1  Patellar                          3+    3+  Gastroc                          3      3     + 2-3 beats clonus each side  Plantar                extensor++  extensor   + clonus each side    P/LT grossly intact.      DISCUSSION and RECOMMENDATIONS    The Hx is incompatible with the reported Dx of GBS.  The exam findings are inconsistent with the diagnosis.    He may well not have had a stroke, or TIA.     Spastic quadriparesis.       Cervical myelopathy.      PRIME NEUROLOGIC DIAGNOSIS; HIV myelopathy. R/O other medical, non-structural, causes for myelopathy.      RECOMMENDATIONS (in lieu of the above):    No neuroimaging studies indicated at his time.    If not done very recently, please order:    B12, folate, methylmalonic acid, homocysteine, ESR, CRP, SPEP, serum immunofixation, syphilis serology, Cu, Zn, SHAI, HIV viral load.D4 count.    The assay for B12 does not measure B12 level directly.  False elevations (to or above the normal range) occur with pernicious anemia due to intrinsic factor antibodies, which may or may not be detected by the antibody test.  B12-like compounds of vegetable origin (a problem with vegans) without cobalamin activity also can lead to falsely elevated determinations.  Methylmalonic acid (MMA) and homocysteine (Hcy) determinations are necessary to elucidate what is happening.    Irrespective of B12 and folate levels:       high Hcy w normal MMA implies folate deficiency;        high MMA and Hcy implies B12 deficiency +/- folate deficiency.    Consider if antiretroviral Rx needs adjustment.

## 2021-09-10 NOTE — DISCHARGE NOTE PROVIDER - PROVIDER TOKENS
PROVIDER:[TOKEN:[[id number]]] PROVIDER:[TOKEN:[[id number]]],PROVIDER:[TOKEN:[[id number]],SCHEDULEDAPPT:[09/29/2021],SCHEDULEDAPPTTIME:[03:15 PM],ESTABLISHEDPATIENT:[T]]

## 2021-09-10 NOTE — H&P ADULT - NSICDXPASTMEDICALHX_GEN_ALL_CORE_FT
PAST MEDICAL HISTORY:  Asthma     Chronic sinusitis     Closed fracture of multiple ribs of right side, initial encounter     Cocaine abuse     CVA (cerebral vascular accident)     GBS (Guillain [place] syndrome)     HIV (human immunodeficiency virus infection) from birth    Homeless

## 2021-09-10 NOTE — DISCHARGE NOTE PROVIDER - CARE PROVIDERS DIRECT ADDRESSES
[email or web address]

## 2021-09-10 NOTE — DISCHARGE NOTE PROVIDER - NSDCMRMEDTOKEN_GEN_ALL_CORE_FT
baclofen 10 mg oral tablet: 1 tab(s) orally 2 times a day  Biktarvy oral tablet: 1 tab(s) orally once a day  gabapentin 300 mg oral capsule: 1 cap(s) orally 3 times a day  Melatonin 3 mg oral tablet: 1 tab(s) orally once a day (at bedtime)  SEROquel 200 mg oral tablet: 1 tab(s) orally once a day (at bedtime)   baclofen 5 mg oral tablet: 3 tab(s) orally 3 times a day  Biktarvy oral tablet: 1 tab(s) orally once a day  gabapentin 300 mg oral capsule: 1 cap(s) orally 3 times a day  Melatonin 3 mg oral tablet: 1 tab(s) orally once a day (at bedtime)  SEROquel 200 mg oral tablet: 1 tab(s) orally once a day (at bedtime)

## 2021-09-10 NOTE — PHYSICAL THERAPY INITIAL EVALUATION ADULT - GAIT DEVIATIONS NOTED, PT EVAL
decreased cabrera/increased time in double stance/decreased step length/decreased stride length/decreased weight-shifting ability

## 2021-09-10 NOTE — CONSULT NOTE ADULT - SUBJECTIVE AND OBJECTIVE BOX
HPI: 31y R-HM w h/o HIV on biktarvy, Guillan [place] syndrome, stroke, Afib (not on AC) and former cocaine abuse presenting to [facility] ED for progressive weakness with Neurology consult for management of baclofen. Patient reports that he lives in a nursing home and he believes he is unable to take care of himself as he feels his weakness is getting progressively worse. He uses a walker and cane for ambulation. He reports adherence to his medications. He believes he was taking baclofen 50mg TID, but then believed the dosing may have been 15mg TID. He has not followed up with Neurology since his last admission. Upon asking, he believes for the last few months that his right eye is "bigger" and looks "different" from his left eyelid. He denies difficulty swallowing or changes to his voice. Denies head or neck trauma, visual disturbance, loss of consciousness, headaches.     Prior admission 6/2021:  Patient had reported frequent intermittent muscle spasms of his lower extremities L>R that radiates from his back. He believed he had new symptoms of urinary and bowel incontinence and new fevers. Neurological examination demonstrated inconsistent weakness (equivocal Muse examination and would have intermittent spasms that would make patient examination limited) with hyperreflexia with concern for possible underlying myelitis from infectious/inflammatory/neoplastic cause. Patient had MR C/T/L spine w/w/o contrast 5/2021 and repeat MR L spine w/w/o 6/2021 without acute pathology. He was also recommended by Neurology to start baclofen 5mg TID for muscle spasms.     REVIEW OF SYSTEMS    A 10-system ROS was performed and is negative except for those items noted above and/or in the HPI.    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    Asthma    CVA (cerebral vascular accident)    Closed fracture of multiple ribs of right side, initial encounter    Cocaine abuse    Chronic sinusitis    Homeless    GBS (Guillain Orlando syndrome)    History of orthopedic surgery  left arm      FAMILY HISTORY:  FH: HIV infection (Mother)    MEDICATIONS (HOME):  Home Medications:  ammonium lactate 12% topical lotion: 1 application topically every 12 hours (16 Jun 2021 13:49)  amoxicillin-clavulanate 875 mg-125 mg oral tablet: 1 tab(s) orally every 12 hours (16 Jun 2021 13:49)  diphenhydramine/lidocaine/aluminum hydroxide/magnesium hydroxide/simethicone mucous membrane suspension: 1 application mucous membrane 2 times a day (16 Jun 2021 13:49)  gabapentin 300 mg oral capsule: 1 cap(s) orally 3 times a day (05 Jun 2021 12:10)  Melatonin 3 mg oral tablet: 1 tab(s) orally once a day (at bedtime) (11 Jun 2021 09:37)  SEROquel 200 mg oral tablet: 1 tab(s) orally once a day (at bedtime) (05 Jun 2021 12:10)    MEDICATIONS  (STANDING):  baclofen 5 milliGRAM(s) Oral Once    MEDICATIONS  (PRN):    ALLERGIES/INTOLERANCES:  Allergies  Ceclor (Unknown)  Toradol (Anaphylaxis; Swelling)  Toradol (Swelling)    VITALS & EXAMINATION:  Vital Signs Last 24 Hrs  T(C): 36.6 (09 Sep 2021 17:23), Max: 36.6 (09 Sep 2021 17:23)  T(F): 97.9 (09 Sep 2021 17:23), Max: 97.9 (09 Sep 2021 17:23)  HR: 73 (09 Sep 2021 17:23) (73 - 73)  BP: 140/78 (09 Sep 2021 17:23) (140/78 - 140/78)  BP(mean): --  RR: 16 (09 Sep 2021 17:23) (16 - 16)  SpO2: 100% (09 Sep 2021 17:23) (100% - 100%)    Neurological (>12):  MS: eyes open, alert, oriented to age, month. Normal affect. Follows all commands.    Language: Speech is clear, fluent with good repetition & comprehension (able to name objects___)    CNs: PERRL (R = 4mm, L = 4mm). VFF. EOM testing demonstrates unilateral nystagmus fatigable with fixation, V1-3 intact to LT/pinprick, well developed masseter muscles b/l. No facial asymmetry b/l, full eye closure strength b/l however noted at rest that right eyelid appears mild-moderately ptotic compared to left eyelid which demonstrates mild ptosis, no fatigable ptosis upon persistent upgaze. Hearing grossly normal (rubbing fingers) b/l. Symmetric palate elevation in midline. Gag reflex deferred. Head turning & shoulder shrug intact b/l. Tongue midline, normal movements, no atrophy.    Motor: Normal muscle bulk & tone. No noticeable tremor, though patient intermittently will show having a spasm. No pronator drift.  Neck flexion/extension limited due to pain/muscle spasm elicited, however patient able to flex and extend neck against resistance as well as lateral bending.   b/l upper extremities demonstrates 5/5 strength in major muscle groups except for triceps 4+/5 b/l  b/l lower extremities demonstrates 4-/5 left hip flexors & knee extensors, 4/5 right, 4/5 plantar/dorsiflexion b/l   	     Sensation: Intact to LT/PP b/l throughout except for dorsum of feet b/l - chronic finding  vibration sense equivocal    Cortical: Extinction on DSS (neglect): none    Reflexes: diffusely hyperreflexic with crossed adductors, no clonus upon dorsiflexion of feet b/l, neg hills b/l              Biceps(C5)       BR(C6)     Triceps(C7)               Patellar(L4)    Achilles(S1)    Plantar Resp  R	3	          3             3		        3		    3		up   L	3	          3	             3		        3		    3		up     Coordination: intact rapid-alt movements. No dysmetria to FTN    Gait: deferred    LABORATORY:  CBC                       13.9   5.08  )-----------( 256      ( 09 Sep 2021 19:28 )             41.6     Chem 09-09    139  |  102  |  19  ----------------------------<  87  4.5   |  21<L>  |  1.00    Ca    9.6      09 Sep 2021 19:28  Phos  3.4     09-09  Mg     1.80     09-09    TPro  8.1  /  Alb  4.7  /  TBili  0.6  /  DBili  x   /  AST  51<H>  /  ALT  20  /  AlkPhos  74  09-09    LFTs LIVER FUNCTIONS - ( 09 Sep 2021 19:28 )  Alb: 4.7 g/dL / Pro: 8.1 g/dL / ALK PHOS: 74 U/L / ALT: 20 U/L / AST: 51 U/L / GGT: x           Coagulopathy   Lipid Panel   A1c   Cardiac enzymes     STUDIES & IMAGING:  Studies (EKG, EEG, EMG, etc):     Radiology (XR, CT, MR, U/S, TTE/GARFIELD):     HPI: 31y R-HM w h/o HIV on biktarvy, Guillan [place] syndrome [SEE ATTENDING ADDENDUM - PROBABLY WRONG DIAGNOSIS] , stroke [SEE ATTENDING ADDENDUM - MAY ALSO BE  WRONG DIAGNOSIS], Afib (not on AC) and former cocaine abuse presenting to [facility] ED for progressive weakness with Neurology consult for management of baclofen. Patient reports that he lives in a nursing home and he believes he is unable to take care of himself as he feels his weakness is getting progressively worse. He uses a walker and cane for ambulation. He reports adherence to his medications. He believes he was taking baclofen 50mg TID, but then believed the dosing may have been 15mg TID. He has not followed up with Neurology since his last admission. Upon asking, he believes for the last few months that his right eye is "bigger" and looks "different" from his left eyelid. He denies difficulty swallowing or changes to his voice. Denies head or neck trauma, visual disturbance, loss of consciousness, headaches.     Prior admission 6/2021:  Patient had reported frequent intermittent muscle spasms of his lower extremities L>R that radiates from his back. He believed he had new symptoms of urinary and bowel incontinence and new fevers. Neurological examination demonstrated inconsistent weakness (equivocal Muse examination and would have intermittent spasms that would make patient examination limited) with hyperreflexia with concern for possible underlying myelitis from infectious/inflammatory/neoplastic cause. Patient had MR C/T/L spine w/w/o contrast 5/2021 and repeat MR L spine w/w/o 6/2021 without acute pathology. He was also recommended by Neurology to start baclofen 5mg TID for muscle spasms.     REVIEW OF SYSTEMS    A 10-system ROS was performed and is negative except for those items noted above and/or in the HPI.    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    Asthma    CVA (cerebral vascular accident)    Closed fracture of multiple ribs of right side, initial encounter    Cocaine abuse    Chronic sinusitis    Homeless    GBS (Guillain [place] syndrome)    History of orthopedic surgery  left arm      FAMILY HISTORY:  FH: HIV infection (Mother)    MEDICATIONS (HOME):  Home Medications:  ammonium lactate 12% topical lotion: 1 application topically every 12 hours (16 Jun 2021 13:49)  amoxicillin-clavulanate 875 mg-125 mg oral tablet: 1 tab(s) orally every 12 hours (16 Jun 2021 13:49)  diphenhydramine/lidocaine/aluminum hydroxide/magnesium hydroxide/simethicone mucous membrane suspension: 1 application mucous membrane 2 times a day (16 Jun 2021 13:49)  gabapentin 300 mg oral capsule: 1 cap(s) orally 3 times a day (05 Jun 2021 12:10)  Melatonin 3 mg oral tablet: 1 tab(s) orally once a day (at bedtime) (11 Jun 2021 09:37)  SEROquel 200 mg oral tablet: 1 tab(s) orally once a day (at bedtime) (05 Jun 2021 12:10)    MEDICATIONS  (STANDING):  baclofen 5 milliGRAM(s) Oral Once    MEDICATIONS  (PRN):    ALLERGIES/INTOLERANCES:  Allergies  Ceclor (Unknown)  Toradol (Anaphylaxis; Swelling)  Toradol (Swelling)    VITALS & EXAMINATION:  Vital Signs Last 24 Hrs  T(C): 36.6 (09 Sep 2021 17:23), Max: 36.6 (09 Sep 2021 17:23)  T(F): 97.9 (09 Sep 2021 17:23), Max: 97.9 (09 Sep 2021 17:23)  HR: 73 (09 Sep 2021 17:23) (73 - 73)  BP: 140/78 (09 Sep 2021 17:23) (140/78 - 140/78)  BP(mean): --  RR: 16 (09 Sep 2021 17:23) (16 - 16)  SpO2: 100% (09 Sep 2021 17:23) (100% - 100%)    Neurological (>12):  MS: eyes open, alert, oriented to age, month. Normal affect. Follows all commands.    Language: Speech is clear, fluent with good repetition & comprehension (able to name objects___)    CNs: PERRL (R = 4mm, L = 4mm). VFF. EOM testing demonstrates unilateral nystagmus fatigable with fixation, V1-3 intact to LT/pinprick, well developed masseter muscles b/l. No facial asymmetry b/l, full eye closure strength b/l however noted at rest that right eyelid appears mild-moderately ptotic compared to left eyelid which demonstrates mild ptosis, no fatigable ptosis upon persistent upgaze. Hearing grossly normal (rubbing fingers) b/l. Symmetric palate elevation in midline. Gag reflex deferred. Head turning & shoulder shrug intact b/l. Tongue midline, normal movements, no atrophy.    Motor: Normal muscle bulk & tone. No noticeable tremor, though patient intermittently will show having a spasm. No pronator drift.  Neck flexion/extension limited due to pain/muscle spasm elicited, however patient able to flex and extend neck against resistance as well as lateral bending.   b/l upper extremities demonstrates 5/5 strength in major muscle groups except for triceps 4+/5 b/l  b/l lower extremities demonstrates 4-/5 left hip flexors & knee extensors, 4/5 right, 4/5 plantar/dorsiflexion b/l   	     Sensation: Intact to LT/PP b/l throughout except for dorsum of feet b/l - chronic finding  vibration sense equivocal    Cortical: Extinction on DSS (neglect): none    Reflexes: diffusely hyperreflexic with crossed adductors, no clonus upon dorsiflexion of feet b/l, neg hills b/l              Biceps(C5)       BR(C6)     Triceps(C7)               Patellar(L4)    Achilles(S1)    Plantar Resp  R	3	          3             3		        3		    3		up   L	3	          3	             3		        3		    3		up     Coordination: intact rapid-alt movements. No dysmetria to FTN    Gait: deferred    LABORATORY:  CBC                       13.9   5.08  )-----------( 256      ( 09 Sep 2021 19:28 )             41.6     Chem 09-09    139  |  102  |  19  ----------------------------<  87  4.5   |  21<L>  |  1.00    Ca    9.6      09 Sep 2021 19:28  Phos  3.4     09-09  Mg     1.80     09-09    TPro  8.1  /  Alb  4.7  /  TBili  0.6  /  DBili  x   /  AST  51<H>  /  ALT  20  /  AlkPhos  74  09-09    LFTs LIVER FUNCTIONS - ( 09 Sep 2021 19:28 )  Alb: 4.7 g/dL / Pro: 8.1 g/dL / ALK PHOS: 74 U/L / ALT: 20 U/L / AST: 51 U/L / GGT: x           Coagulopathy   Lipid Panel   A1c   Cardiac enzymes     STUDIES & IMAGING:  Studies (EKG, EEG, EMG, etc):     Radiology (XR, CT, MR, U/S, TTE/GARFIELD):     HPI: 31y R-HM w h/o HIV on biktarvy, Guillan [place] syndrome [SEE ATTENDING ADDENDUM - PROBABLY INCORECT DIAGNOSIS] , stroke [SEE ATTENDING ADDENDUM - MAY ALSO BE  INCORRECT DIAGNOSIS], Afib (not on AC) and former cocaine abuse presenting to [facility] ED for progressive weakness with Neurology consult for management of baclofen. Patient reports that he lives in a nursing home and he believes he is unable to take care of himself as he feels his weakness is getting progressively worse. He uses a walker and cane for ambulation. He reports adherence to his medications. He believes he was taking baclofen 50mg TID, but then believed the dosing may have been 15mg TID. He has not followed up with Neurology since his last admission. Upon asking, he believes for the last few months that his right eye is "bigger" and looks "different" from his left eyelid. He denies difficulty swallowing or changes to his voice. Denies head or neck trauma, visual disturbance, loss of consciousness, headaches.     Prior admission 6/2021:  Patient had reported frequent intermittent muscle spasms of his lower extremities L>R that radiates from his back. He believed he had new symptoms of urinary and bowel incontinence and new fevers. Neurological examination demonstrated inconsistent weakness (equivocal Muse examination and would have intermittent spasms that would make patient examination limited) with hyperreflexia with concern for possible underlying myelitis from infectious/inflammatory/neoplastic cause. Patient had MR C/T/L spine w/w/o contrast 5/2021 and repeat MR L spine w/w/o 6/2021 without acute pathology. He was also recommended by Neurology to start baclofen 5mg TID for muscle spasms.     REVIEW OF SYSTEMS    A 10-system ROS was performed and is negative except for those items noted above and/or in the HPI.    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    Asthma    CVA (cerebral vascular accident)    Closed fracture of multiple ribs of right side, initial encounter    Cocaine abuse    Chronic sinusitis    Homeless    GBS (Guillain [place] syndrome)    History of orthopedic surgery  left arm      FAMILY HISTORY:  FH: HIV infection (Mother)    MEDICATIONS (HOME):  Home Medications:  ammonium lactate 12% topical lotion: 1 application topically every 12 hours (16 Jun 2021 13:49)  amoxicillin-clavulanate 875 mg-125 mg oral tablet: 1 tab(s) orally every 12 hours (16 Jun 2021 13:49)  diphenhydramine/lidocaine/aluminum hydroxide/magnesium hydroxide/simethicone mucous membrane suspension: 1 application mucous membrane 2 times a day (16 Jun 2021 13:49)  gabapentin 300 mg oral capsule: 1 cap(s) orally 3 times a day (05 Jun 2021 12:10)  Melatonin 3 mg oral tablet: 1 tab(s) orally once a day (at bedtime) (11 Jun 2021 09:37)  SEROquel 200 mg oral tablet: 1 tab(s) orally once a day (at bedtime) (05 Jun 2021 12:10)    MEDICATIONS  (STANDING):  baclofen 5 milliGRAM(s) Oral Once    MEDICATIONS  (PRN):    ALLERGIES/INTOLERANCES:  Allergies  Ceclor (Unknown)  Toradol (Anaphylaxis; Swelling)  Toradol (Swelling)    VITALS & EXAMINATION:  Vital Signs Last 24 Hrs  T(C): 36.6 (09 Sep 2021 17:23), Max: 36.6 (09 Sep 2021 17:23)  T(F): 97.9 (09 Sep 2021 17:23), Max: 97.9 (09 Sep 2021 17:23)  HR: 73 (09 Sep 2021 17:23) (73 - 73)  BP: 140/78 (09 Sep 2021 17:23) (140/78 - 140/78)  BP(mean): --  RR: 16 (09 Sep 2021 17:23) (16 - 16)  SpO2: 100% (09 Sep 2021 17:23) (100% - 100%)    Neurological (>12):  MS: eyes open, alert, oriented to age, month. Normal affect. Follows all commands.    Language: Speech is clear, fluent with good repetition & comprehension (able to name objects___)    CNs: PERRL (R = 4mm, L = 4mm). VFF. EOM testing demonstrates unilateral nystagmus fatigable with fixation, V1-3 intact to LT/pinprick, well developed masseter muscles b/l. No facial asymmetry b/l, full eye closure strength b/l however noted at rest that right eyelid appears mild-moderately ptotic compared to left eyelid which demonstrates mild ptosis, no fatigable ptosis upon persistent upgaze. Hearing grossly normal (rubbing fingers) b/l. Symmetric palate elevation in midline. Gag reflex deferred. Head turning & shoulder shrug intact b/l. Tongue midline, normal movements, no atrophy.    Motor: Normal muscle bulk & tone. No noticeable tremor, though patient intermittently will show having a spasm. No pronator drift.  Neck flexion/extension limited due to pain/muscle spasm elicited, however patient able to flex and extend neck against resistance as well as lateral bending.   b/l upper extremities demonstrates 5/5 strength in major muscle groups except for triceps 4+/5 b/l  b/l lower extremities demonstrates 4-/5 left hip flexors & knee extensors, 4/5 right, 4/5 plantar/dorsiflexion b/l   	     Sensation: Intact to LT/PP b/l throughout except for dorsum of feet b/l - chronic finding  vibration sense equivocal    Cortical: Extinction on DSS (neglect): none    Reflexes: diffusely hyperreflexic with crossed adductors, no clonus upon dorsiflexion of feet b/l, neg hills b/l              Biceps(C5)       BR(C6)     Triceps(C7)               Patellar(L4)    Achilles(S1)    Plantar Resp  R	3	          3             3		        3		    3		up   L	3	          3	             3		        3		    3		up     Coordination: intact rapid-alt movements. No dysmetria to FTN    Gait: deferred    LABORATORY:  CBC                       13.9   5.08  )-----------( 256      ( 09 Sep 2021 19:28 )             41.6     Chem 09-09    139  |  102  |  19  ----------------------------<  87  4.5   |  21<L>  |  1.00    Ca    9.6      09 Sep 2021 19:28  Phos  3.4     09-09  Mg     1.80     09-09    TPro  8.1  /  Alb  4.7  /  TBili  0.6  /  DBili  x   /  AST  51<H>  /  ALT  20  /  AlkPhos  74  09-09    LFTs LIVER FUNCTIONS - ( 09 Sep 2021 19:28 )  Alb: 4.7 g/dL / Pro: 8.1 g/dL / ALK PHOS: 74 U/L / ALT: 20 U/L / AST: 51 U/L / GGT: x           Coagulopathy   Lipid Panel   A1c   Cardiac enzymes     STUDIES & IMAGING:  Studies (EKG, EEG, EMG, etc):     Radiology (XR, CT, MR, U/S, TTE/GARFIELD):

## 2021-09-10 NOTE — H&P ADULT - ATTENDING COMMENTS
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 c/w Neurontin, baclofen.   c/w ART, obtain cd4 count/viral load

## 2021-09-10 NOTE — DISCHARGE NOTE PROVIDER - CARE PROVIDER_API CALL
Nestor Mcdaniels)  Internal Medicine  [address] [place] [address]  Phone: [phone]  Fax: [phone]  Follow Up Time:    Nestor Mcdaniels)  Internal Medicine  [address] [place] [address]  Phone: [phone]  Fax: [phone]  Follow Up Time:     Easton Hunter)  Internal Medicine  [address] [place] [address]  Phone: [phone]  Fax: [phone]  Established Patient  Scheduled Appointment: 09/29/2021 03:15 PM

## 2021-09-11 LAB
ANION GAP SERPL CALC-SCNC: 11 MMOL/L — SIGNIFICANT CHANGE UP (ref 7–14)
BUN SERPL-MCNC: 11 MG/DL — SIGNIFICANT CHANGE UP (ref 7–23)
CALCIUM SERPL-MCNC: 8.7 MG/DL — SIGNIFICANT CHANGE UP (ref 8.4–10.5)
CHLORIDE SERPL-SCNC: 108 MMOL/L — HIGH (ref 98–107)
CK SERPL-CCNC: 434 U/L — HIGH (ref 30–200)
CO2 SERPL-SCNC: 21 MMOL/L — LOW (ref 22–31)
CREAT SERPL-MCNC: 0.89 MG/DL — SIGNIFICANT CHANGE UP (ref 0.5–1.3)
GLUCOSE SERPL-MCNC: 87 MG/DL — SIGNIFICANT CHANGE UP (ref 70–99)
MAGNESIUM SERPL-MCNC: 1.7 MG/DL — SIGNIFICANT CHANGE UP (ref 1.6–2.6)
PCP SPEC-MCNC: SIGNIFICANT CHANGE UP
PHOSPHATE SERPL-MCNC: 3.5 MG/DL — SIGNIFICANT CHANGE UP (ref 2.5–4.5)
POTASSIUM SERPL-MCNC: 4 MMOL/L — SIGNIFICANT CHANGE UP (ref 3.5–5.3)
POTASSIUM SERPL-SCNC: 4 MMOL/L — SIGNIFICANT CHANGE UP (ref 3.5–5.3)
SODIUM SERPL-SCNC: 140 MMOL/L — SIGNIFICANT CHANGE UP (ref 135–145)

## 2021-09-11 PROCEDURE — 99232 SBSQ HOSP IP/OBS MODERATE 35: CPT | Mod: GC

## 2021-09-11 RX ORDER — SODIUM CHLORIDE 9 MG/ML
1000 INJECTION INTRAMUSCULAR; INTRAVENOUS; SUBCUTANEOUS
Refills: 0 | Status: DISCONTINUED | OUTPATIENT
Start: 2021-09-11 | End: 2021-09-11

## 2021-09-11 RX ORDER — SODIUM CHLORIDE 9 MG/ML
1000 INJECTION INTRAMUSCULAR; INTRAVENOUS; SUBCUTANEOUS
Refills: 0 | Status: DISCONTINUED | OUTPATIENT
Start: 2021-09-11 | End: 2021-09-17

## 2021-09-11 RX ADMIN — LIDOCAINE 1 PATCH: 4 CREAM TOPICAL at 13:30

## 2021-09-11 RX ADMIN — GABAPENTIN 300 MILLIGRAM(S): 400 CAPSULE ORAL at 13:29

## 2021-09-11 RX ADMIN — Medication 1000 UNIT(S): at 13:30

## 2021-09-11 RX ADMIN — GABAPENTIN 300 MILLIGRAM(S): 400 CAPSULE ORAL at 05:56

## 2021-09-11 RX ADMIN — Medication 15 MILLIGRAM(S): at 13:29

## 2021-09-11 RX ADMIN — BICTEGRAVIR SODIUM, EMTRICITABINE, AND TENOFOVIR ALAFENAMIDE FUMARATE 1 TABLET(S): 30; 120; 15 TABLET ORAL at 13:29

## 2021-09-11 RX ADMIN — GABAPENTIN 300 MILLIGRAM(S): 400 CAPSULE ORAL at 22:48

## 2021-09-11 RX ADMIN — QUETIAPINE FUMARATE 200 MILLIGRAM(S): 200 TABLET, FILM COATED ORAL at 22:48

## 2021-09-11 RX ADMIN — SODIUM CHLORIDE 100 MILLILITER(S): 9 INJECTION INTRAMUSCULAR; INTRAVENOUS; SUBCUTANEOUS at 09:29

## 2021-09-11 RX ADMIN — LIDOCAINE 1 PATCH: 4 CREAM TOPICAL at 18:19

## 2021-09-11 RX ADMIN — Medication 15 MILLIGRAM(S): at 22:48

## 2021-09-11 RX ADMIN — Medication 3 MILLIGRAM(S): at 22:49

## 2021-09-11 RX ADMIN — Medication 15 MILLIGRAM(S): at 05:56

## 2021-09-11 NOTE — PROGRESS NOTE ADULT - SUBJECTIVE AND OBJECTIVE BOX
August Ren MD PGY3  Pager: [phone] JOSEFINA, [phone] [facility]  After 7: Night Float pager    Patient is a 31y old  Male who presents with a chief complaint of LE weakness (10 Sep 2021 13:09)      SUBJECTIVE / OVERNIGHT EVENTS: Overnight, no acute events. Patient seen and examined at bedside this AM.     MEDICATIONS  (STANDING):  baclofen 15 milliGRAM(s) Oral three times a day  bictegravir 50 mG/emtricitabine 200 mG/tenofovir alafenamide 25 mG (BIKTARVY) 1 Tablet(s) Oral daily  cholecalciferol 1000 Unit(s) Oral daily  enoxaparin Injectable 40 milliGRAM(s) SubCutaneous daily  gabapentin 300 milliGRAM(s) Oral three times a day  lidocaine   4% Patch 1 Patch Transdermal daily  QUEtiapine 200 milliGRAM(s) Oral at bedtime  sodium chloride 0.9%. 1000 milliLiter(s) (100 mL/Hr) IV Continuous <Continuous>    MEDICATIONS  (PRN):  LORazepam   Injectable 1 milliGRAM(s) IV Push once PRN prior to MRI  melatonin 3 milliGRAM(s) Oral at bedtime PRN Insomnia      Vital Signs Last 24 Hrs  T(C): 36.5 (11 Sep 2021 06:43), Max: 36.6 (10 Sep 2021 13:43)  T(F): 97.7 (11 Sep 2021 06:43), Max: 97.8 (10 Sep 2021 13:43)  HR: 65 (11 Sep 2021 06:43) (65 - 72)  BP: 125/71 (11 Sep 2021 06:43) (125/71 - 130/78)  BP(mean): --  RR: 17 (11 Sep 2021 06:43) (17 - 17)  SpO2: 100% (11 Sep 2021 06:43) (99% - 100%)  CAPILLARY BLOOD GLUCOSE        I&O's Summary    10 Sep 2021 07:01  -  11 Sep 2021 07:00  --------------------------------------------------------  IN: 640 mL / OUT: 700 mL / NET: -60 mL        PHYSICAL EXAM:  GENERAL: NAD  EYES: EOMI, PERRLA, conjunctiva and sclera clear  NECK:  No JVD  CHEST/LUNG: CTABL ; No wheeze  HEART: RRR; No murmurs  ABDOMEN: Soft, Nontender, Nondistended; Bowel sounds present  : No Brizuela  EXTREMITIES:  2+ Peripheral Pulses, No edema  PSYCH: AAOx3  NEUROLOGY: b/l LE weakness 1-2/5. b/l Ptosis. CN II-XII intact. +hyperreflexia  SKIN: No rashes or lesions. No sacral ulcer    LABS:                        13.2   4.06  )-----------( 248      ( 10 Sep 2021 09:21 )             39.8     09-10    138  |  100  |  16  ----------------------------<  123<H>  3.2<L>   |  22  |  0.93    Ca    9.2      10 Sep 2021 09:21  Phos  3.0     09-10  Mg     1.80     09-10    TPro  8.1  /  Alb  4.7  /  TBili  0.6  /  DBili  x   /  AST  51<H>  /  ALT  20  /  AlkPhos  74  09-09      CARDIAC MARKERS ( 10 Sep 2021 09:21 )  x     / x     / 918 U/L / x     / x              Microbiology;        RADIOLOGY & ADDITIONAL TESTS:    Imaging Personally Reviewed:          Consultant(s) Notes Reviewed: neurology    Care Discussed with Consultants/Other Providers: neurology

## 2021-09-11 NOTE — PROGRESS NOTE ADULT - SUBJECTIVE AND OBJECTIVE BOX
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 Denies any fevers, chills, trauma, or falls. Pt at baseline walks w/walker and cane for ambulation; and is adherent to medications. Denies any  associated symptoms of fevers, or chills. Ambulates w/walker at baseline.    Per chart review, pt was admitted 5/2021 for LE weakness at which point he had extensive imaging including MRI C/T/L-spine, which showed no acute concerning findings.    In the ED, pt afebrile, HR 60-70s, -140s, SaO2 99 RA. S/p baclofen and gabapentin (10 Sep 2021 04:46)      Past Medical History  HIV (human immunodeficiency virus infection)    Guillain BarrÃ© syndrome    Guillain-Boyd    Asthma    HIV (human immunodeficiency virus infection)    CVA (cerebral vascular accident)    Closed fracture of multiple ribs of right side, initial encounter    Cocaine abuse    Chronic sinusitis    Homeless    HIV disease    HIV (human immunodeficiency virus infection)    GBS (Guillain Boyd syndrome)    Guillain-Boyd syndrome    HIV positive    Stroke        Past Surgical History  History of orthopedic surgery    No significant past surgical history    No significant past surgical history        MEDICATIONS    baclofen 15 milliGRAM(s) Oral three times a day  bictegravir 50 mG/emtricitabine 200 mG/tenofovir alafenamide 25 mG (BIKTARVY) 1 Tablet(s) Oral daily  cholecalciferol 1000 Unit(s) Oral daily  enoxaparin Injectable 40 milliGRAM(s) SubCutaneous daily  gabapentin 300 milliGRAM(s) Oral three times a day  lidocaine   4% Patch 1 Patch Transdermal daily  LORazepam   Injectable 1 milliGRAM(s) IV Push once PRN  melatonin 3 milliGRAM(s) Oral at bedtime PRN  QUEtiapine 200 milliGRAM(s) Oral at bedtime  sodium chloride 0.9%. 1000 milliLiter(s) IV Continuous <Continuous>         Family history: No history of dementia, strokes, or seizures   FAMILY HISTORY:  FH: HIV infection (Mother)      SOCIAL HISTORY -- No history of tobacco or alcohol use     Allergies    Ceclor (Unknown)  Toradol (Anaphylaxis; Swelling)  Toradol (Swelling)    Intolerances            Vital Signs Last 24 Hrs  T(C): 36.5 (11 Sep 2021 06:43), Max: 36.6 (10 Sep 2021 13:43)  T(F): 97.7 (11 Sep 2021 06:43), Max: 97.8 (10 Sep 2021 13:43)  HR: 65 (11 Sep 2021 06:43) (65 - 72)  BP: 125/71 (11 Sep 2021 06:43) (125/71 - 130/78)  BP(mean): --  RR: 17 (11 Sep 2021 06:43) (17 - 17)  SpO2: 100% (11 Sep 2021 06:43) (99% - 100%)        On Neurological Examination:    Mental Status - Patient is alert, awake, oriented X3. .   Follows commands well and able to answer questions appropriately. Mood and affect  normal  Follow simple commands able to repeat  able to name.  Speech - Fluent no Dysarthria  no  Aphasia                              Cranial Nerves - Extraocular muscle intact  ROGERS Facial symmetry Tongue midline, CnV1to V3 intact gross hearing intact      Motor Exam - poor effort  Right upper  5/5 throughout  Left upper 5/5 throughtout  Right lower- 5/5 throughout  Left lower 5/5 throughout  Coordination -finger to nose intact  Muscle tone - is normal all over. No asymmetry is seen.      Sensory    Bilateral intact to light touch    GENERAL Exam:     Nontoxic , No Acute Distress   	  HEENT:  normocephalic, atraumatic  		  LUNGS:	Clear bilaterally  No Wheeze      VASCULAR: no carotid brui  	  HEART:	 Normal S1S2   No murmur RRR        	  MUSCULOSKELETAL: Normal Range of Motion  	   SKIN:      Normal   No Ecchymosis               LABS:  CBC Full  -  ( 10 Sep 2021 09:21 )  WBC Count : 4.06 K/uL  RBC Count : 4.57 M/uL  Hemoglobin : 13.2 g/dL  Hematocrit : 39.8 %  Platelet Count - Automated : 248 K/uL  Mean Cell Volume : 87.1 fL  Mean Cell Hemoglobin : 28.9 pg  Mean Cell Hemoglobin Concentration : 33.2 gm/dL  Auto Neutrophil # : x  Auto Lymphocyte # : x  Auto Monocyte # : x  Auto Eosinophil # : x  Auto Basophil # : x  Auto Neutrophil % : x  Auto Lymphocyte % : x  Auto Monocyte % : x  Auto Eosinophil % : x  Auto Basophil % : x      09-11    140  |  108<H>  |  11  ----------------------------<  87  4.0   |  21<L>  |  0.89    Ca    8.7      11 Sep 2021 07:06  Phos  3.5     09-11  Mg     1.70     09-11    TPro  8.1  /  Alb  4.7  /  TBili  0.6  /  DBili  x   /  AST  51<H>  /  ALT  20  /  AlkPhos  74  09-09    Hemoglobin A1C:     LIVER FUNCTIONS - ( 09 Sep 2021 19:28 )  Alb: 4.7 g/dL / Pro: 8.1 g/dL / ALK PHOS: 74 U/L / ALT: 20 U/L / AST: 51 U/L / GGT: x           Vitamin B12         RADIOLOGY    EKG      I          schoenberg

## 2021-09-11 NOTE — CHART NOTE - NSCHARTNOTEFT_GEN_A_CORE
Neurology informed primary team re: updated recs. In brief, pt is  31y R-HM w h/o HIV on biktarvy, Guillan [place] syndrome, stroke, Afib (not on AC) and former cocaine abuse presenting to [facility] ED for progressive weakness with Neurology consult for management of baclofen. Neurological examination on 9/10 showed significant weakness in all extremities, worse on b/l LE. Hyperreflexia noted on b/l LE. Mild clonus present on b/l feet. Significant lower back pain upon extending b/l knees, worse on R>L. B/l ptosis that was non-fatigued.    Impression: non-fatiguing mild-moderate b/l ptosis and spastic quadriparesis of unclear etiology, unable to localize due to diffuse nature of sx, suspect potential UMN disease, possibly 2/2 myelitis (HIV myelitis) vs less likely cervical myelopathy given neg recent MR spine vs autoimmune causes (r/o MG) vs other infectious/inflammatory causes    Below recent MR results were reviewed.    < from: MR Lumbar Spine w/wo IV Cont (05.02.21 @ 03:11) >  Cervical spine had mild C6-C7 disc bulge  Unremarkable thoracic spine  Lumbar spine had L5-S1 L lateral disc protrusion abutting S1 nerve root which is unchanged from prior study    Impression:  Motion degraded exam.  Mild spondylitic changes.  No spinal cord or intraspinal abnormality identified.  Prominent nasopharyngeal soft tissue likely representing adenoidal hypertrophy. Correlation with direct visualization is advised.    < end of copied text >      Lab results:  TSH 0.85 (9/10/21)  CRP < 4 (6/5/21)  ESR 25 (6/5/21)    Pending:  AChR binding/modulating/blocking, MuSK ab, SHAI, HIV-1 Viral Load and T cell subset    Recommendations:  [] given recent negative MR of the entire spine, no repeat MR C/T/L required at this time and can be cancelled  [] agree MR brain w/wo contrast  [] will f/u HIV serology and T cell subset studies  [] c/w current management per primary team    Discussed with general neurology attending Dr. Lashaun Alfredo, PGY-2  Neurology Neurology informed primary team re: updated recs. In brief, pt is  31y R-HM w h/o HIV on biktarvy, Guillan [place] syndrome, stroke, Afib (not on AC) and former cocaine abuse presenting to [facility] ED for progressive weakness with Neurology consult for management of baclofen. Neurological examination on 9/10 showed significant weakness in all extremities, worse on b/l LE. Hyperreflexia noted on b/l LE. Mild clonus present on b/l feet. Significant lower back pain upon extending b/l knees, worse on R>L. B/l ptosis that was non-fatigued.    Impression: non-fatiguing mild-moderate b/l ptosis and spastic quadriparesis of unclear etiology, unable to localize due to diffuse nature of sx, suspect potential UMN disease, possibly 2/2 myelitis (HIV myelitis) vs less likely cervical myelopathy given neg recent MR spine vs autoimmune causes (r/o MG) vs other infectious/inflammatory causes. unclear reason for taking baclofen.    Below recent MR results were reviewed.    < from: MR Lumbar Spine w/wo IV Cont (05.02.21 @ 03:11) >  Cervical spine had mild C6-C7 disc bulge  Unremarkable thoracic spine  Lumbar spine had L5-S1 L lateral disc protrusion abutting S1 nerve root which is unchanged from prior study    Impression:  Motion degraded exam.  Mild spondylitic changes.  No spinal cord or intraspinal abnormality identified.  Prominent nasopharyngeal soft tissue likely representing adenoidal hypertrophy. Correlation with direct visualization is advised.    < end of copied text >      Lab results:  TSH 0.85 (9/10/21)  CRP < 4 (6/5/21)  ESR 25 (6/5/21)    Pending:  AChR binding/modulating/blocking, MuSK ab, SHAI, HIV-1 Viral Load and T cell subset    Recommendations:  [] given recent negative MR of the entire spine, no repeat MR C/T/L required at this time and can be cancelled  [] agree MR brain w/wo contrast  [] will f/u HIV serology and T cell subset studies  [] c/w baclofen management per primary team    Discussed with general neurology attending Dr. Lashaun Alfredo, PGY-2  Neurology Neurology informed primary team re: updated recs. In brief, pt is  31y R-HM w h/o HIV on biktarvy, Guillan [place] syndrome, stroke, Afib (not on AC) and former cocaine abuse presenting to [facility] ED for progressive weakness with Neurology consult for management of baclofen. Neurological examination on 9/10 showed significant weakness in all extremities, worse on b/l LE. Hyperreflexia noted on b/l LE. Mild clonus present on b/l feet. Significant lower back pain upon extending b/l knees, worse on R>L. B/l ptosis that was non-fatigued.    Impression: non-fatiguing mild-moderate b/l ptosis and spastic quadriparesis of unclear etiology, unable to localize due to diffuse nature of sx, suspect potential UMN disease, possibly 2/2 myelitis (HIV myelitis) vs less likely cervical myelopathy given neg recent MR spine vs autoimmune causes (r/o MG) vs other infectious/inflammatory causes. unclear reason for taking baclofen.    Below recent MR results were reviewed.    < from: MR Lumbar Spine w/wo IV Cont (05.02.21 @ 03:11) >  Cervical spine had mild C6-C7 disc bulge  Unremarkable thoracic spine  Lumbar spine had L5-S1 L lateral disc protrusion abutting S1 nerve root which is unchanged from prior study    Impression:  Motion degraded exam.  Mild spondylitic changes.  No spinal cord or intraspinal abnormality identified.  Prominent nasopharyngeal soft tissue likely representing adenoidal hypertrophy. Correlation with direct visualization is advised.    < end of copied text >      Lab results:  TSH 0.85 (9/10/21)  CRP < 4 (6/5/21)  ESR 25 (6/5/21)    Pending:  AChR binding/modulating/blocking, MuSK ab, SHAI, HIV-1 Viral Load and T cell subset    Recommendations:  [] given recent negative MR of the entire spine, no repeat MR C/T/L required at this time and can be cancelled, along with MR brain   [] will f/u HIV serology and T cell subset studies  [] c/w baclofen management per primary team    Discussed with general neurology attending Dr. Lashaun Alfredo, PGY-2  Neurology Neurology informed primary team re: updated recs. In brief, pt is  31y R-HM w h/o HIV on biktarvy, Guillan [place] syndrome, ?stroke, Afib (not on AC) and former cocaine abuse presenting to [facility] ED for progressive weakness with Neurology consult for management of baclofen. Neurological examination on 9/10 showed significant weakness in all extremities, worse on b/l LE. Hyperreflexia noted on b/l LE. Mild clonus present on b/l feet. Significant lower back pain upon extending b/l knees, worse on R>L. B/l ptosis that was non-fatigued.    Impression: non-fatiguing mild-moderate b/l ptosis and spastic quadriparesis of unclear etiology, unable to localize due to diffuse nature of sx, suspect potential UMN disease, possibly 2/2 myelitis (HIV myelitis) vs less likely cervical myelopathy given neg recent MR spine vs autoimmune causes (r/o MG) vs other infectious/inflammatory causes. unclear reason for taking baclofen.    Below recent MR results were reviewed.    < from: MR Lumbar Spine w/wo IV Cont (05.02.21 @ 03:11) >  Cervical spine had mild C6-C7 disc bulge  Unremarkable thoracic spine  Lumbar spine had L5-S1 L lateral disc protrusion abutting S1 nerve root which is unchanged from prior study    Impression:  Motion degraded exam.  Mild spondylitic changes.  No spinal cord or intraspinal abnormality identified.  Prominent nasopharyngeal soft tissue likely representing adenoidal hypertrophy. Correlation with direct visualization is advised.    < end of copied text >      Lab results:  CRP < 4 (6/5/21)  ESR 25 (6/5/21)  Syphlis neg (6/5/21)    B12 396 (9/9/21)  Folate 20 (9/9/21)  TSH 0.85 (9/10/21)    Pending:  AChR binding/modulating/blocking, MuSK ab, SHAI, HIV-1 Viral Load and T cell subset    Recommendations:  [] given recent negative MR of the entire spine, no repeat MR C/T/L required at this time and can be cancelled, along with MR brain   [] will f/u HIV serology and T cell subset studies  [] will f/u AChR ab and MuSK ab for r/o MG  [] please obtain methylmalonic acid, homocysteine, ESR, CRP, SPEP, serum immunofixation, syphilis, Cu, Zn,  [] c/w baclofen management per primary team  [] please refer to Dr. Wilks's attestation on 9/10/21    Discussed with general neurology attending Dr. Lashaun Alfredo, PGY-2  Neurology Neurology informed primary team re: updated recs. In brief, pt is  31y R-HM w h/o HIV on biktarvy, Guillan [place] syndrome, ?stroke, Afib (not on AC) and former cocaine abuse presenting to [facility] ED for progressive weakness with Neurology consult for management of baclofen. Neurological examination on 9/10 showed significant weakness in all extremities, worse on b/l LE. Hyperreflexia noted on b/l LE. Mild clonus present on b/l feet. Significant lower back pain upon extending b/l knees, worse on R>L. B/l ptosis that was non-fatigued.    Impression: non-fatiguing mild-moderate b/l ptosis and spastic quadriparesis of unclear etiology, unable to localize due to diffuse nature of sx, possibly 2/2 myelitis (HIV myelitis) vs autoimmune causes (r/o MG) vs other infectious/inflammatory causes. unclear reason for taking baclofen. clinical findings does correlate with cervical myelopathy     Below recent MR results were reviewed.    < from: MR Lumbar Spine w/wo IV Cont (05.02.21 @ 03:11) >  Cervical spine had mild C6-C7 disc bulge  Unremarkable thoracic spine  Lumbar spine had L5-S1 L lateral disc protrusion abutting S1 nerve root which is unchanged from prior study    Impression:  Motion degraded exam.  Mild spondylitic changes.  No spinal cord or intraspinal abnormality identified.  Prominent nasopharyngeal soft tissue likely representing adenoidal hypertrophy. Correlation with direct visualization is advised.    < end of copied text >      Lab results:  CRP < 4 (6/5/21)  ESR 25 (6/5/21)  Syphlis neg (6/5/21)    B12 396 (9/9/21)  Folate 20 (9/9/21)  TSH 0.85 (9/10/21)    Pending:  AChR binding/modulating/blocking, MuSK ab, SHAI, HIV-1 Viral Load and T cell subset    Recommendations:  [] no neuro exam at this time and r/o other medical causes behind myelopathy  [] will f/u HIV serology and T cell subset studies  [] will f/u AChR ab and MuSK ab for r/o MG  [] please obtain methylmalonic acid, homocysteine, ESR, CRP, SPEP, serum immunofixation, syphilis, Cu, Zn,  [] c/w baclofen management per primary team  [] please refer to Dr. Wilks's attestation on 9/10/21    Discussed with general neurology attending Dr. Lashaun Alfredo, PGY-2  Neurology NEUROLOGY ATTENDING ADDENDUM ADDED 1/30/22 AFTER I SAW THE PATIENT 1/27/22 on subesquent [facility] ADMISSION    THIS PATIENT DOES NOT HAVE AND DID NOT HAVE GBS!!!!!  The DIAGNOSIS OF GBS MADE AT AN OUTSIDE HOSPITAL WAS INCORRECT.  SEE THE NEUROLOGY CONSULT NOTE DATED 9/10/21 WHICH I HAVE EDITED.    Selin Wilks M.D.   - Department of Neurology  [facility]    END OF ADDENDUM     +++++++++++++++++++++++++++++++++++++++++++++++++++++++++++++++      Neurology informed primary team re: updated recs. In brief, pt is  31y R-HM w h/o HIV on biktarvy, Guillan [place] syndrome, ?stroke, Afib (not on AC) and former cocaine abuse presenting to [facility] ED for progressive weakness with Neurology consult for management of baclofen. Neurological examination on 9/10 showed significant weakness in all extremities, worse on b/l LE. Hyperreflexia noted on b/l LE. Mild clonus present on b/l feet. Significant lower back pain upon extending b/l knees, worse on R>L. B/l ptosis that was non-fatigued.    Impression: non-fatiguing mild-moderate b/l ptosis and spastic quadriparesis of unclear etiology, unable to localize due to diffuse nature of sx, possibly 2/2 myelitis (HIV myelitis) vs autoimmune causes (r/o MG) vs other infectious/inflammatory causes. unclear reason for taking baclofen. clinical findings does correlate with cervical myelopathy     Below recent MR results were reviewed.    < from: MR Lumbar Spine w/wo IV Cont (05.02.21 @ 03:11) >  Cervical spine had mild C6-C7 disc bulge  Unremarkable thoracic spine  Lumbar spine had L5-S1 L lateral disc protrusion abutting S1 nerve root which is unchanged from prior study    Impression:  Motion degraded exam.  Mild spondylitic changes.  No spinal cord or intraspinal abnormality identified.  Prominent nasopharyngeal soft tissue likely representing adenoidal hypertrophy. Correlation with direct visualization is advised.    < end of copied text >      Lab results:  CRP < 4 (6/5/21)  ESR 25 (6/5/21)  Syphlis neg (6/5/21)    B12 396 (9/9/21)  Folate 20 (9/9/21)  TSH 0.85 (9/10/21)    Pending:  AChR binding/modulating/blocking, MuSK ab, SHAI, HIV-1 Viral Load and T cell subset    Recommendations:  [] no neuro exam at this time and r/o other medical causes behind myelopathy  [] will f/u HIV serology and T cell subset studies  [] will f/u AChR ab and MuSK ab for r/o MG  [] please obtain methylmalonic acid, homocysteine, ESR, CRP, SPEP, serum immunofixation, syphilis, Cu, Zn,  [] c/w baclofen management per primary team  [] please refer to Dr. Wilks's attestation on 9/10/21    Discussed with general neurology attending Dr. Lashaun Alfredo, PGY-2  Neurology NEUROLOGY ATTENDING ADDENDUM ADDED 1/30/22 AFTER I SAW THE PATIENT 1/27/22 on subsequent [facility] ADMISSION    THIS PATIENT DOES NOT HAVE AND DID NOT HAVE GBS!!!!!  The DIAGNOSIS OF GBS MADE AT AN OUTSIDE HOSPITAL WAS INCORRECT.  SEE THE NEUROLOGY CONSULT NOTE DATED 9/10/21 WHICH I HAVE EDITED.    Selin Wilks M.D.   - Department of Neurology  [facility]    END OF ADDENDUM     +++++++++++++++++++++++++++++++++++++++++++++++++++++++++++++++      Neurology informed primary team re: updated recs. In brief, pt is  31y R-HM w h/o HIV on biktarvy, Guillan [place] syndrome, ?stroke, Afib (not on AC) and former cocaine abuse presenting to [facility] ED for progressive weakness with Neurology consult for management of baclofen. Neurological examination on 9/10 showed significant weakness in all extremities, worse on b/l LE. Hyperreflexia noted on b/l LE. Mild clonus present on b/l feet. Significant lower back pain upon extending b/l knees, worse on R>L. B/l ptosis that was non-fatigued.    Impression: non-fatiguing mild-moderate b/l ptosis and spastic quadriparesis of unclear etiology, unable to localize due to diffuse nature of sx, possibly 2/2 myelitis (HIV myelitis) vs autoimmune causes (r/o MG) vs other infectious/inflammatory causes. unclear reason for taking baclofen. clinical findings does correlate with cervical myelopathy     Below recent MR results were reviewed.    < from: MR Lumbar Spine w/wo IV Cont (05.02.21 @ 03:11) >  Cervical spine had mild C6-C7 disc bulge  Unremarkable thoracic spine  Lumbar spine had L5-S1 L lateral disc protrusion abutting S1 nerve root which is unchanged from prior study    Impression:  Motion degraded exam.  Mild spondylitic changes.  No spinal cord or intraspinal abnormality identified.  Prominent nasopharyngeal soft tissue likely representing adenoidal hypertrophy. Correlation with direct visualization is advised.    < end of copied text >      Lab results:  CRP < 4 (6/5/21)  ESR 25 (6/5/21)  Syphlis neg (6/5/21)    B12 396 (9/9/21)  Folate 20 (9/9/21)  TSH 0.85 (9/10/21)    Pending:  AChR binding/modulating/blocking, MuSK ab, SHAI, HIV-1 Viral Load and T cell subset    Recommendations:  [] no neuro exam at this time and r/o other medical causes behind myelopathy  [] will f/u HIV serology and T cell subset studies  [] will f/u AChR ab and MuSK ab for r/o MG  [] please obtain methylmalonic acid, homocysteine, ESR, CRP, SPEP, serum immunofixation, syphilis, Cu, Zn,  [] c/w baclofen management per primary team  [] please refer to Dr. Wilks's attestation on 9/10/21    Discussed with general neurology attending Dr. Lashaun Alfredo, PGY-2  Neurology

## 2021-09-11 NOTE — PROGRESS NOTE ADULT - ASSESSMENT
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

## 2021-09-12 LAB
ANA TITR SER: NEGATIVE — SIGNIFICANT CHANGE UP
ANION GAP SERPL CALC-SCNC: 14 MMOL/L — SIGNIFICANT CHANGE UP (ref 7–14)
BUN SERPL-MCNC: 11 MG/DL — SIGNIFICANT CHANGE UP (ref 7–23)
CALCIUM SERPL-MCNC: 9 MG/DL — SIGNIFICANT CHANGE UP (ref 8.4–10.5)
CHLORIDE SERPL-SCNC: 104 MMOL/L — SIGNIFICANT CHANGE UP (ref 98–107)
CK SERPL-CCNC: 222 U/L — HIGH (ref 30–200)
CO2 SERPL-SCNC: 20 MMOL/L — LOW (ref 22–31)
CREAT SERPL-MCNC: 0.89 MG/DL — SIGNIFICANT CHANGE UP (ref 0.5–1.3)
CRP SERPL-MCNC: <4 MG/L — SIGNIFICANT CHANGE UP
ERYTHROCYTE [SEDIMENTATION RATE] IN BLOOD: 9 MM/HR — SIGNIFICANT CHANGE UP (ref 1–15)
GLUCOSE SERPL-MCNC: 111 MG/DL — HIGH (ref 70–99)
MAGNESIUM SERPL-MCNC: 1.5 MG/DL — LOW (ref 1.6–2.6)
PHOSPHATE SERPL-MCNC: 3.5 MG/DL — SIGNIFICANT CHANGE UP (ref 2.5–4.5)
POTASSIUM SERPL-MCNC: 3.7 MMOL/L — SIGNIFICANT CHANGE UP (ref 3.5–5.3)
POTASSIUM SERPL-SCNC: 3.7 MMOL/L — SIGNIFICANT CHANGE UP (ref 3.5–5.3)
PROT SERPL-MCNC: 6.9 G/DL — SIGNIFICANT CHANGE UP (ref 6–8.3)
SODIUM SERPL-SCNC: 138 MMOL/L — SIGNIFICANT CHANGE UP (ref 135–145)

## 2021-09-12 PROCEDURE — 86334 IMMUNOFIX E-PHORESIS SERUM: CPT | Mod: 26

## 2021-09-12 PROCEDURE — 99232 SBSQ HOSP IP/OBS MODERATE 35: CPT | Mod: GC

## 2021-09-12 PROCEDURE — 84165 PROTEIN E-PHORESIS SERUM: CPT | Mod: 26

## 2021-09-12 RX ORDER — MAGNESIUM SULFATE 500 MG/ML
1 VIAL (ML) INJECTION ONCE
Refills: 0 | Status: COMPLETED | OUTPATIENT
Start: 2021-09-12 | End: 2021-09-12

## 2021-09-12 RX ADMIN — LIDOCAINE 1 PATCH: 4 CREAM TOPICAL at 18:17

## 2021-09-12 RX ADMIN — Medication 1000 UNIT(S): at 13:10

## 2021-09-12 RX ADMIN — GABAPENTIN 300 MILLIGRAM(S): 400 CAPSULE ORAL at 05:54

## 2021-09-12 RX ADMIN — LIDOCAINE 1 PATCH: 4 CREAM TOPICAL at 01:03

## 2021-09-12 RX ADMIN — Medication 100 GRAM(S): at 13:08

## 2021-09-12 RX ADMIN — Medication 15 MILLIGRAM(S): at 05:54

## 2021-09-12 RX ADMIN — Medication 15 MILLIGRAM(S): at 22:58

## 2021-09-12 RX ADMIN — Medication 3 MILLIGRAM(S): at 23:00

## 2021-09-12 RX ADMIN — GABAPENTIN 300 MILLIGRAM(S): 400 CAPSULE ORAL at 22:58

## 2021-09-12 RX ADMIN — QUETIAPINE FUMARATE 200 MILLIGRAM(S): 200 TABLET, FILM COATED ORAL at 22:58

## 2021-09-12 RX ADMIN — GABAPENTIN 300 MILLIGRAM(S): 400 CAPSULE ORAL at 13:10

## 2021-09-12 RX ADMIN — Medication 15 MILLIGRAM(S): at 13:09

## 2021-09-12 RX ADMIN — BICTEGRAVIR SODIUM, EMTRICITABINE, AND TENOFOVIR ALAFENAMIDE FUMARATE 1 TABLET(S): 30; 120; 15 TABLET ORAL at 13:48

## 2021-09-12 RX ADMIN — LIDOCAINE 1 PATCH: 4 CREAM TOPICAL at 13:09

## 2021-09-12 NOTE — PROGRESS NOTE ADULT - SUBJECTIVE AND OBJECTIVE BOX
Patient is a 31y old  Male who presents with a chief complaint of LE weakness (11 Sep 2021 12:11)      SUBJECTIVE / OVERNIGHT EVENTS:    MEDICATIONS  (STANDING):  baclofen 15 milliGRAM(s) Oral three times a day  bictegravir 50 mG/emtricitabine 200 mG/tenofovir alafenamide 25 mG (BIKTARVY) 1 Tablet(s) Oral daily  cholecalciferol 1000 Unit(s) Oral daily  enoxaparin Injectable 40 milliGRAM(s) SubCutaneous daily  gabapentin 300 milliGRAM(s) Oral three times a day  lidocaine   4% Patch 1 Patch Transdermal daily  QUEtiapine 200 milliGRAM(s) Oral at bedtime  sodium chloride 0.9%. 1000 milliLiter(s) (100 mL/Hr) IV Continuous <Continuous>    MEDICATIONS  (PRN):  LORazepam   Injectable 1 milliGRAM(s) IV Push once PRN prior to MRI  melatonin 3 milliGRAM(s) Oral at bedtime PRN Insomnia      CAPILLARY BLOOD GLUCOSE        I&O's Summary    11 Sep 2021 07:01  -  12 Sep 2021 07:00  --------------------------------------------------------  IN: 500 mL / OUT: 800 mL / NET: -300 mL        Vital Signs Last 24 Hrs  T(C): 36.8 (12 Sep 2021 05:58), Max: 36.8 (12 Sep 2021 05:58)  T(F): 98.2 (12 Sep 2021 05:58), Max: 98.2 (12 Sep 2021 05:58)  HR: 67 (12 Sep 2021 05:58) (67 - 71)  BP: 111/64 (12 Sep 2021 05:58) (111/64 - 127/68)  BP(mean): --  RR: 18 (12 Sep 2021 05:58) (16 - 18)  SpO2: 98% (12 Sep 2021 05:58) (98% - 100%)    PHYSICAL EXAM:  GENERAL: NAD, well-developed, well-nourished  EYES: Eyes tracking, sclera clear  CHEST/LUNG: Clear to auscultation bilaterally; No wheezes or crackles  HEART: Normal S1/S2; Regular rate and rhythm; No murmurs, rubs, or gallops  ABDOMEN: Soft, Nontender, Nondistended; Bowel sounds present  EXTREMITIES: 2+ Peripheral Pulses; No clubbing, cyanosis, or edema  PSYCH: A&Ox3  NEUROLOGY: Following commands  SKIN: No rashes or lesions    LABS:                        13.2   4.06  )-----------( 248      ( 10 Sep 2021 09:21 )             39.8      09-11    140  |  108<H>  |  11  ----------------------------<  87  4.0   |  21<L>  |  0.89    Ca    8.7      11 Sep 2021 07:06  Phos  3.5     09-11  Mg     1.70     09-11        CARDIAC MARKERS ( 11 Sep 2021 07:06 )  x     / x     / 434 U/L / x     / x      CARDIAC MARKERS ( 10 Sep 2021 09:21 )  x     / x     / 918 U/L / x     / x              RADIOLOGY & ADDITIONAL TESTS:    Imaging Personally Reviewed:    Consultant(s) Notes Reviewed:      Care Discussed with Consultants/Other Providers:       Patient is a 31y old  Male who presents with a chief complaint of LE weakness (11 Sep 2021 12:11)      SUBJECTIVE / OVERNIGHT EVENTS: NAEON. Patient states his neurologic symptoms are stable. Denies chest pain, abdominal pain, N/V/D, headache.     MEDICATIONS  (STANDING):  baclofen 15 milliGRAM(s) Oral three times a day  bictegravir 50 mG/emtricitabine 200 mG/tenofovir alafenamide 25 mG (BIKTARVY) 1 Tablet(s) Oral daily  cholecalciferol 1000 Unit(s) Oral daily  enoxaparin Injectable 40 milliGRAM(s) SubCutaneous daily  gabapentin 300 milliGRAM(s) Oral three times a day  lidocaine   4% Patch 1 Patch Transdermal daily  QUEtiapine 200 milliGRAM(s) Oral at bedtime  sodium chloride 0.9%. 1000 milliLiter(s) (100 mL/Hr) IV Continuous <Continuous>    MEDICATIONS  (PRN):  LORazepam   Injectable 1 milliGRAM(s) IV Push once PRN prior to MRI  melatonin 3 milliGRAM(s) Oral at bedtime PRN Insomnia      CAPILLARY BLOOD GLUCOSE        I&O's Summary    11 Sep 2021 07:01  -  12 Sep 2021 07:00  --------------------------------------------------------  IN: 500 mL / OUT: 800 mL / NET: -300 mL        Vital Signs Last 24 Hrs  T(C): 36.8 (12 Sep 2021 05:58), Max: 36.8 (12 Sep 2021 05:58)  T(F): 98.2 (12 Sep 2021 05:58), Max: 98.2 (12 Sep 2021 05:58)  HR: 67 (12 Sep 2021 05:58) (67 - 71)  BP: 111/64 (12 Sep 2021 05:58) (111/64 - 127/68)  BP(mean): --  RR: 18 (12 Sep 2021 05:58) (16 - 18)  SpO2: 98% (12 Sep 2021 05:58) (98% - 100%)    PHYSICAL EXAM:  GENERAL: NAD  EYES: EOMI, PERRLA, conjunctiva and sclera clear  NECK:  No JVD  CHEST/LUNG: CTABL ; No wheeze  HEART: RRR; No murmurs  ABDOMEN: Soft, Nontender, Nondistended; Bowel sounds present  : No Brizuela  EXTREMITIES:  2+ Peripheral Pulses, No edema  PSYCH: AAOx3  NEUROLOGY: b/l LE weakness 1-2/5. b/l Ptosis. CN II-XII intact. +hyperreflexia  SKIN: No rashes or lesions. No sacral ulcer    LABS:                        13.2   4.06  )-----------( 248      ( 10 Sep 2021 09:21 )             39.8      09-11    140  |  108<H>  |  11  ----------------------------<  87  4.0   |  21<L>  |  0.89    Ca    8.7      11 Sep 2021 07:06  Phos  3.5     09-11  Mg     1.70     09-11        CARDIAC MARKERS ( 11 Sep 2021 07:06 )  x     / x     / 434 U/L / x     / x      CARDIAC MARKERS ( 10 Sep 2021 09:21 )  x     / x     / 918 U/L / x     / x              RADIOLOGY & ADDITIONAL TESTS:    Imaging Personally Reviewed:    Consultant(s) Notes Reviewed:      Care Discussed with Consultants/Other Providers:       Patient is a 31y old  Male who presents with a chief complaint of LE weakness (11 Sep 2021 12:11)      SUBJECTIVE / OVERNIGHT EVENTS: NAEON. Patient states his neurologic symptoms are stable. Denies chest pain, abdominal pain, N/V/D, headache.     MEDICATIONS  (STANDING):  baclofen 15 milliGRAM(s) Oral three times a day  bictegravir 50 mG/emtricitabine 200 mG/tenofovir alafenamide 25 mG (BIKTARVY) 1 Tablet(s) Oral daily  cholecalciferol 1000 Unit(s) Oral daily  enoxaparin Injectable 40 milliGRAM(s) SubCutaneous daily  gabapentin 300 milliGRAM(s) Oral three times a day  lidocaine   4% Patch 1 Patch Transdermal daily  QUEtiapine 200 milliGRAM(s) Oral at bedtime  sodium chloride 0.9%. 1000 milliLiter(s) (100 mL/Hr) IV Continuous <Continuous>    MEDICATIONS  (PRN):  LORazepam   Injectable 1 milliGRAM(s) IV Push once PRN prior to MRI  melatonin 3 milliGRAM(s) Oral at bedtime PRN Insomnia      CAPILLARY BLOOD GLUCOSE        I&O's Summary    11 Sep 2021 07:01  -  12 Sep 2021 07:00  --------------------------------------------------------  IN: 500 mL / OUT: 800 mL / NET: -300 mL        Vital Signs Last 24 Hrs  T(C): 36.8 (12 Sep 2021 05:58), Max: 36.8 (12 Sep 2021 05:58)  T(F): 98.2 (12 Sep 2021 05:58), Max: 98.2 (12 Sep 2021 05:58)  HR: 67 (12 Sep 2021 05:58) (67 - 71)  BP: 111/64 (12 Sep 2021 05:58) (111/64 - 127/68)  BP(mean): --  RR: 18 (12 Sep 2021 05:58) (16 - 18)  SpO2: 98% (12 Sep 2021 05:58) (98% - 100%)    PHYSICAL EXAM:  GENERAL: NAD  EYES: EOMI, PERRLA, conjunctiva and sclera clear  NECK:  No JVD  CHEST/LUNG: CTABL ; No wheeze  HEART: RRR; No murmurs  ABDOMEN: Soft, Nontender, Nondistended; Bowel sounds present  : No Brizuela  EXTREMITIES:  2+ Peripheral Pulses, No edema  PSYCH: AAOx3  NEUROLOGY: b/l LE weakness 1-2/5. b/l Ptosis. CN II-XII intact. +hyperreflexia  SKIN: No rashes or lesions. No sacral ulcer    LABS:      09-12    138  |  104  |  11  ----------------------------<  111<H>  3.7   |  20<L>  |  0.89    Ca    9.0      12 Sep 2021 07:01  Phos  3.5     09-12  Mg     1.50     09-12    TPro  6.9  /  Alb  x   /  TBili  x   /  DBili  x   /  AST  x   /  ALT  x   /  AlkPhos  x   09-12        CARDIAC MARKERS ( 12 Sep 2021 07:01 )  x     / x     / 222 U/L / x     / x      CARDIAC MARKERS ( 11 Sep 2021 07:06 )  x     / x     / 434 U/L / x     / x            COVID-19 PCR: NotDetec (09 Sep 2021 19:38)  COVID-19 PCR: NotDetec (14 Jun 2021 14:31)  COVID-19 PCR: NotDetec (09 Jun 2021 12:08)  COVID-19 PCR: NotDetec (05 Jun 2021 02:55)  COVID-19 PCR: NotDetec (24 May 2021 10:27)  SARS-CoV-2: NotDetec (20 May 2021 23:22)  COVID-19 PCR: NotDetec (02 May 2021 05:08)  SARS-CoV-2: NotDetec (27 Apr 2021 22:54)                       RADIOLOGY & ADDITIONAL TESTS:    Imaging Personally Reviewed:    Consultant(s) Notes Reviewed:      Care Discussed with Consultants/Other Providers:

## 2021-09-12 NOTE — PROGRESS NOTE ADULT - SUBJECTIVE AND OBJECTIVE BOX
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 Denies any fevers, chills, trauma, or falls. Pt at baseline walks w/walker and cane for ambulation; and is adherent to medications. Denies any  associated symptoms of fevers, or chills. Ambulates w/walker at baseline.    Per chart review, pt was admitted 5/2021 for LE weakness at which point he had extensive imaging including MRI C/T/L-spine, which showed no acute concerning findings.    In the ED, pt afebrile, HR 60-70s, -140s, SaO2 99 RA. S/p baclofen and gabapentin (10 Sep 2021 04:46)      Past Medical History  HIV (human immunodeficiency virus infection)    Guillain BarrÃ© syndrome    Guillain-[place]    Asthma    HIV (human immunodeficiency virus infection)    CVA (cerebral vascular accident)    Closed fracture of multiple ribs of right side, initial encounter    Cocaine abuse    Chronic sinusitis    Homeless    HIV disease    HIV (human immunodeficiency virus infection)    GBS (Guillain [place] syndrome)    Guillain-[place] syndrome    HIV positive    Stroke        Past Surgical History  History of orthopedic surgery    No significant past surgical history    No significant past surgical history        MEDICATIONS    baclofen 15 milliGRAM(s) Oral three times a day  bictegravir 50 mG/emtricitabine 200 mG/tenofovir alafenamide 25 mG (BIKTARVY) 1 Tablet(s) Oral daily  cholecalciferol 1000 Unit(s) Oral daily  enoxaparin Injectable 40 milliGRAM(s) SubCutaneous daily  gabapentin 300 milliGRAM(s) Oral three times a day  lidocaine   4% Patch 1 Patch Transdermal daily  LORazepam   Injectable 1 milliGRAM(s) IV Push once PRN  melatonin 3 milliGRAM(s) Oral at bedtime PRN  QUEtiapine 200 milliGRAM(s) Oral at bedtime  sodium chloride 0.9%. 1000 milliLiter(s) IV Continuous <Continuous>         Family history: No history of dementia, strokes, or seizures   FAMILY HISTORY:  FH: HIV infection (Mother)      SOCIAL HISTORY -- No history of tobacco or alcohol use     Allergies    Ceclor (Unknown)  Toradol (Anaphylaxis; Swelling)  Toradol (Swelling)    Intolerances            Vital Signs Last 24 Hrs  T(C): 36.5 (11 Sep 2021 06:43), Max: 36.6 (10 Sep 2021 13:43)  T(F): 97.7 (11 Sep 2021 06:43), Max: 97.8 (10 Sep 2021 13:43)  HR: 65 (11 Sep 2021 06:43) (65 - 72)  BP: 125/71 (11 Sep 2021 06:43) (125/71 - 130/78)  BP(mean): --  RR: 17 (11 Sep 2021 06:43) (17 - 17)  SpO2: 100% (11 Sep 2021 06:43) (99% - 100%)        On Neurological Examination:    Mental Status - Patient is alert, awake, oriented X3. .   Follows commands well and able to answer questions appropriately. Mood and affect  normal  Follow simple commands able to repeat  able to name.  Speech - Fluent no Dysarthria  no  Aphasia                              Cranial Nerves - Extraocular muscle intact  ROGERS Facial symmetry Tongue midline, CnV1to V3 intact gross hearing intact      Motor Exam - poor effort  Right upper  5/5 throughout  Left upper 5/5 throughtout  Right lower- 5/5 throughout  Left lower 5/5 throughout  Coordination -finger to nose intact  Muscle tone - is normal all over. No asymmetry is seen.      Sensory    Bilateral intact to light touch    GENERAL Exam:     Nontoxic , No Acute Distress   	  HEENT:  normocephalic, atraumatic  		  LUNGS:	Clear bilaterally  No Wheeze      VASCULAR: no carotid brui  	  HEART:	 Normal S1S2   No murmur RRR        	  MUSCULOSKELETAL: Normal Range of Motion  	   SKIN:      Normal   No Ecchymosis               LABS:  CBC Full  -  ( 10 Sep 2021 09:21 )  WBC Count : 4.06 K/uL  RBC Count : 4.57 M/uL  Hemoglobin : 13.2 g/dL  Hematocrit : 39.8 %  Platelet Count - Automated : 248 K/uL  Mean Cell Volume : 87.1 fL  Mean Cell Hemoglobin : 28.9 pg  Mean Cell Hemoglobin Concentration : 33.2 gm/dL  Auto Neutrophil # : x  Auto Lymphocyte # : x  Auto Monocyte # : x  Auto Eosinophil # : x  Auto Basophil # : x  Auto Neutrophil % : x  Auto Lymphocyte % : x  Auto Monocyte % : x  Auto Eosinophil % : x  Auto Basophil % : x      09-11    140  |  108<H>  |  11  ----------------------------<  87  4.0   |  21<L>  |  0.89    Ca    8.7      11 Sep 2021 07:06  Phos  3.5     09-11  Mg     1.70     09-11    TPro  8.1  /  Alb  4.7  /  TBili  0.6  /  DBili  x   /  AST  51<H>  /  ALT  20  /  AlkPhos  74  09-09    Hemoglobin A1C:     LIVER FUNCTIONS - ( 09 Sep 2021 19:28 )  Alb: 4.7 g/dL / Pro: 8.1 g/dL / ALK PHOS: 74 U/L / ALT: 20 U/L / AST: 51 U/L / GGT: x           Vitamin B12         RADIOLOGY    EKG      I          schoenberg     Assessment and Plan:   · Assessment	  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

## 2021-09-13 LAB
% ALBUMIN: SIGNIFICANT CHANGE UP %
% ALPHA 1: SIGNIFICANT CHANGE UP %
% ALPHA 2: SIGNIFICANT CHANGE UP %
% GAMMA: SIGNIFICANT CHANGE UP %
4/8 RATIO: 0.3 RATIO — LOW (ref 0.9–3.6)
4/8 RATIO: 0.41 RATIO — LOW (ref 0.9–3.6)
ABS CD8: 1060 /UL — HIGH (ref 142–740)
ABS CD8: 831 /UL — HIGH (ref 142–740)
ACRM BINDING ANTIBODY: 0.05 NMOL/L — SIGNIFICANT CHANGE UP (ref 0–0.24)
ANION GAP SERPL CALC-SCNC: 14 MMOL/L — SIGNIFICANT CHANGE UP (ref 7–14)
BASOPHILS # BLD AUTO: 0.04 K/UL — SIGNIFICANT CHANGE UP (ref 0–0.2)
BASOPHILS NFR BLD AUTO: 0.9 % — SIGNIFICANT CHANGE UP (ref 0–2)
BUN SERPL-MCNC: 11 MG/DL — SIGNIFICANT CHANGE UP (ref 7–23)
CALCIUM SERPL-MCNC: 9.4 MG/DL — SIGNIFICANT CHANGE UP (ref 8.4–10.5)
CD16+CD56+ CELLS NFR BLD: 4 % — LOW (ref 5–23)
CD16+CD56+ CELLS NFR SPEC: 61 /UL — LOW (ref 71–410)
CD19 BLASTS SPEC-ACNC: 19 % — SIGNIFICANT CHANGE UP (ref 6–24)
CD19 BLASTS SPEC-ACNC: 302 /UL — SIGNIFICANT CHANGE UP (ref 84–469)
CD3 BLASTS SPEC-ACNC: 1184 /UL — SIGNIFICANT CHANGE UP (ref 672–1870)
CD3 BLASTS SPEC-ACNC: 1438 /UL — SIGNIFICANT CHANGE UP (ref 672–1870)
CD3 BLASTS SPEC-ACNC: 74 % — SIGNIFICANT CHANGE UP (ref 59–83)
CD3 BLASTS SPEC-ACNC: 74 % — SIGNIFICANT CHANGE UP (ref 59–83)
CD4 %: 17 % — LOW (ref 30–62)
CD4 %: 21 % — LOW (ref 30–62)
CD8 %: 51 % — HIGH (ref 12–36)
CD8 %: 54 % — HIGH (ref 12–36)
CHLORIDE SERPL-SCNC: 101 MMOL/L — SIGNIFICANT CHANGE UP (ref 98–107)
CK SERPL-CCNC: 149 U/L — SIGNIFICANT CHANGE UP (ref 30–200)
CO2 SERPL-SCNC: 22 MMOL/L — SIGNIFICANT CHANGE UP (ref 22–31)
CREAT SERPL-MCNC: 0.92 MG/DL — SIGNIFICANT CHANGE UP (ref 0.5–1.3)
EOSINOPHIL # BLD AUTO: 0.31 K/UL — SIGNIFICANT CHANGE UP (ref 0–0.5)
EOSINOPHIL NFR BLD AUTO: 7.2 % — HIGH (ref 0–6)
GLUCOSE SERPL-MCNC: 102 MG/DL — HIGH (ref 70–99)
HCT VFR BLD CALC: 42.4 % — SIGNIFICANT CHANGE UP (ref 39–50)
HCYS SERPL-MCNC: 7.7 UMOL/L — SIGNIFICANT CHANGE UP
HGB BLD-MCNC: 13.9 G/DL — SIGNIFICANT CHANGE UP (ref 13–17)
IANC: 1.39 K/UL — LOW (ref 1.5–8.5)
IMM GRANULOCYTES NFR BLD AUTO: 0.2 % — SIGNIFICANT CHANGE UP (ref 0–1.5)
LYMPHOCYTES # BLD AUTO: 2.08 K/UL — SIGNIFICANT CHANGE UP (ref 1–3.3)
LYMPHOCYTES # BLD AUTO: 48.6 % — HIGH (ref 13–44)
MAGNESIUM SERPL-MCNC: 1.7 MG/DL — SIGNIFICANT CHANGE UP (ref 1.6–2.6)
MCHC RBC-ENTMCNC: 28.7 PG — SIGNIFICANT CHANGE UP (ref 27–34)
MCHC RBC-ENTMCNC: 32.8 GM/DL — SIGNIFICANT CHANGE UP (ref 32–36)
MCV RBC AUTO: 87.4 FL — SIGNIFICANT CHANGE UP (ref 80–100)
MONOCYTES # BLD AUTO: 0.45 K/UL — SIGNIFICANT CHANGE UP (ref 0–0.9)
MONOCYTES NFR BLD AUTO: 10.5 % — SIGNIFICANT CHANGE UP (ref 2–14)
NEUTROPHILS # BLD AUTO: 1.39 K/UL — LOW (ref 1.8–7.4)
NEUTROPHILS NFR BLD AUTO: 32.6 % — LOW (ref 43–77)
NRBC # BLD: 0 /100 WBCS — SIGNIFICANT CHANGE UP
NRBC # FLD: 0 K/UL — SIGNIFICANT CHANGE UP
PHOSPHATE SERPL-MCNC: 2.2 MG/DL — LOW (ref 2.5–4.5)
PLATELET # BLD AUTO: 251 K/UL — SIGNIFICANT CHANGE UP (ref 150–400)
POTASSIUM SERPL-MCNC: 4.1 MMOL/L — SIGNIFICANT CHANGE UP (ref 3.5–5.3)
POTASSIUM SERPL-SCNC: 4.1 MMOL/L — SIGNIFICANT CHANGE UP (ref 3.5–5.3)
PROT PATTERN SERPL ELPH-IMP: SIGNIFICANT CHANGE UP
PROT SERPL-MCNC: 7.3 G/DL — SIGNIFICANT CHANGE UP (ref 6–8.3)
PROT SERPL-MCNC: SIGNIFICANT CHANGE UP G/DL
RBC # BLD: 4.85 M/UL — SIGNIFICANT CHANGE UP (ref 4.2–5.8)
RBC # FLD: 12.5 % — SIGNIFICANT CHANGE UP (ref 10.3–14.5)
SODIUM SERPL-SCNC: 137 MMOL/L — SIGNIFICANT CHANGE UP (ref 135–145)
T PALLIDUM AB TITR SER: NEGATIVE — SIGNIFICANT CHANGE UP
T-CELL CD4 SUBSET PNL BLD: 322 /UL — LOW (ref 489–1457)
T-CELL CD4 SUBSET PNL BLD: 340 /UL — LOW (ref 489–1457)
WBC # BLD: 4.28 K/UL — SIGNIFICANT CHANGE UP (ref 3.8–10.5)
WBC # FLD AUTO: 4.28 K/UL — SIGNIFICANT CHANGE UP (ref 3.8–10.5)

## 2021-09-13 PROCEDURE — 99232 SBSQ HOSP IP/OBS MODERATE 35: CPT | Mod: GC

## 2021-09-13 RX ORDER — POTASSIUM PHOSPHATE, MONOBASIC POTASSIUM PHOSPHATE, DIBASIC 236; 224 MG/ML; MG/ML
15 INJECTION, SOLUTION INTRAVENOUS ONCE
Refills: 0 | Status: COMPLETED | OUTPATIENT
Start: 2021-09-13 | End: 2021-09-13

## 2021-09-13 RX ADMIN — GABAPENTIN 300 MILLIGRAM(S): 400 CAPSULE ORAL at 21:40

## 2021-09-13 RX ADMIN — Medication 15 MILLIGRAM(S): at 21:40

## 2021-09-13 RX ADMIN — Medication 1000 UNIT(S): at 12:22

## 2021-09-13 RX ADMIN — ENOXAPARIN SODIUM 40 MILLIGRAM(S): 100 INJECTION SUBCUTANEOUS at 12:22

## 2021-09-13 RX ADMIN — QUETIAPINE FUMARATE 200 MILLIGRAM(S): 200 TABLET, FILM COATED ORAL at 21:41

## 2021-09-13 RX ADMIN — Medication 3 MILLIGRAM(S): at 21:41

## 2021-09-13 RX ADMIN — Medication 15 MILLIGRAM(S): at 13:13

## 2021-09-13 RX ADMIN — BICTEGRAVIR SODIUM, EMTRICITABINE, AND TENOFOVIR ALAFENAMIDE FUMARATE 1 TABLET(S): 30; 120; 15 TABLET ORAL at 12:22

## 2021-09-13 RX ADMIN — LIDOCAINE 1 PATCH: 4 CREAM TOPICAL at 12:23

## 2021-09-13 RX ADMIN — LIDOCAINE 1 PATCH: 4 CREAM TOPICAL at 17:49

## 2021-09-13 RX ADMIN — GABAPENTIN 300 MILLIGRAM(S): 400 CAPSULE ORAL at 13:13

## 2021-09-13 RX ADMIN — Medication 15 MILLIGRAM(S): at 05:33

## 2021-09-13 RX ADMIN — GABAPENTIN 300 MILLIGRAM(S): 400 CAPSULE ORAL at 05:35

## 2021-09-13 RX ADMIN — POTASSIUM PHOSPHATE, MONOBASIC POTASSIUM PHOSPHATE, DIBASIC 62.5 MILLIMOLE(S): 236; 224 INJECTION, SOLUTION INTRAVENOUS at 17:49

## 2021-09-13 NOTE — PROGRESS NOTE ADULT - SUBJECTIVE AND OBJECTIVE BOX
Patient:  NELSON MITCHELL  [id number]    Progress Note    Interval events: No acute events.  Pertinent ROS (if any):      Administered:  gabapentin: 300 milliGRAM(s) Oral (09-13 @ 05:35)  baclofen: 15 milliGRAM(s) Oral (09-13 @ 05:33)  melatonin: 3 milliGRAM(s) Oral (09-12 @ 23:00)  gabapentin: 300 milliGRAM(s) Oral (09-12 @ 22:58)  baclofen: 15 milliGRAM(s) Oral (09-12 @ 22:58)  QUEtiapine: 200 milliGRAM(s) Oral (09-12 @ 22:58)        OBJECTIVE:    CAPILLARY BLOOD GLUCOSE            VITALS:  T(F): 97.7 (09-13-21 @ 05:35), Max: 98 (09-12-21 @ 13:05)  HR: 75 (09-13-21 @ 05:35) (66 - 75)  BP: 110/60 (09-13-21 @ 05:35) (109/66 - 110/65)  BP(mean): --  ABP: --  ABP(mean): --  RR: 17 (09-13-21 @ 05:35) (17 - 17)  SpO2: 100% (09-13-21 @ 05:35) (100% - 100%)    PHYSICAL EXAM:  GENERAL: NAD  EYES: EOMI, PERRLA, conjunctiva and sclera clear  NECK:  No JVD  CHEST/LUNG: CTABL ; No wheeze  HEART: RRR; No murmurs  ABDOMEN: Soft, Nontender, Nondistended; Bowel sounds present  : No Brizuela  EXTREMITIES:  2+ Peripheral Pulses, No edema  PSYCH: AAOx3  NEUROLOGY: b/l LE weakness 1-2/5. b/l Ptosis. CN II-XII intact. +hyperreflexia  SKIN: No rashes or lesions. No sacral ulcer    HOSPITAL MEDICATIONS:  Standing Meds:  baclofen 15 milliGRAM(s) Oral three times a day  bictegravir 50 mG/emtricitabine 200 mG/tenofovir alafenamide 25 mG (BIKTARVY) 1 Tablet(s) Oral daily  cholecalciferol 1000 Unit(s) Oral daily  enoxaparin Injectable 40 milliGRAM(s) SubCutaneous daily  gabapentin 300 milliGRAM(s) Oral three times a day  lidocaine   4% Patch 1 Patch Transdermal daily  QUEtiapine 200 milliGRAM(s) Oral at bedtime  sodium chloride 0.9%. 1000 milliLiter(s) IV Continuous <Continuous>      PRN Meds:  LORazepam   Injectable 1 milliGRAM(s) IV Push once PRN  melatonin 3 milliGRAM(s) Oral at bedtime PRN      LABS:      Hgb trend: 13.2 <--   WBC trend: 4.06 <--       CMP 09-12-21 @ 07:01    138  |  104  |  11  ----------------------------<  111<H>  3.7   |  20<L>  |  0.89    Phos  3.5     09-12  Mg     1.50     09-12    TPro  6.9  /  Alb  x   /  TBili  x   /  DBili  x   /  AST  x   /  ALT  x   /  AlkPhos  x   09-12      Serum Cr trend: 0.89 <-- , 0.89 <-- , 0.93 <--         ABG Trend:     VBG Trend:       MICROBIOLOGY:       RADIOLOGY:  [ ] Reviewed and interpreted by me    EKG:   Patient:  NELSON MITCHELL  [id number]    Mr. 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     Progress Note    Interval events: No acute events overnight. Patient continues to be in pain with continued episodes of spasms without much improvement. He states that he tried walking from his bed to the entrance of the room with assistance but found it very difficult to do so. No headache, chest pain, difficulty breathing, vision changes.       Administered:  gabapentin: 300 milliGRAM(s) Oral (09-13 @ 05:35)  baclofen: 15 milliGRAM(s) Oral (09-13 @ 05:33)  melatonin: 3 milliGRAM(s) Oral (09-12 @ 23:00)  gabapentin: 300 milliGRAM(s) Oral (09-12 @ 22:58)  baclofen: 15 milliGRAM(s) Oral (09-12 @ 22:58)  QUEtiapine: 200 milliGRAM(s) Oral (09-12 @ 22:58)        OBJECTIVE:    CAPILLARY BLOOD GLUCOSE            VITALS:  T(F): 97.7 (09-13-21 @ 05:35), Max: 98 (09-12-21 @ 13:05)  HR: 75 (09-13-21 @ 05:35) (66 - 75)  BP: 110/60 (09-13-21 @ 05:35) (109/66 - 110/65)  BP(mean): --  ABP: --  ABP(mean): --  RR: 17 (09-13-21 @ 05:35) (17 - 17)  SpO2: 100% (09-13-21 @ 05:35) (100% - 100%)    PHYSICAL EXAM:  GENERAL: NAD  EYES: EOMI, PERRLA, conjunctiva and sclera clear  NECK:  No JVD  CHEST/LUNG: CTABL ; No wheeze  HEART: RRR; No murmurs  ABDOMEN: Soft, Nontender, Nondistended; Bowel sounds present  : No Brizuela  EXTREMITIES:  2+ Peripheral Pulses, No edema  PSYCH: AAOx3  NEUROLOGY: b/l LE weakness 1-2/5, more prominent on left. b/l Ptosis. CN II-XII intact. +hyperreflexia. Upper extremities normal   SKIN: No rashes or lesions. No sacral ulcer    HOSPITAL MEDICATIONS:  Standing Meds:  baclofen 15 milliGRAM(s) Oral three times a day  bictegravir 50 mG/emtricitabine 200 mG/tenofovir alafenamide 25 mG (BIKTARVY) 1 Tablet(s) Oral daily  cholecalciferol 1000 Unit(s) Oral daily  enoxaparin Injectable 40 milliGRAM(s) SubCutaneous daily  gabapentin 300 milliGRAM(s) Oral three times a day  lidocaine   4% Patch 1 Patch Transdermal daily  QUEtiapine 200 milliGRAM(s) Oral at bedtime  sodium chloride 0.9%. 1000 milliLiter(s) IV Continuous <Continuous>      PRN Meds:  LORazepam   Injectable 1 milliGRAM(s) IV Push once PRN  melatonin 3 milliGRAM(s) Oral at bedtime PRN      LABS:      Hgb trend: 13.2 <--   WBC trend: 4.06 <--       CMP 09-12-21 @ 07:01    138  |  104  |  11  ----------------------------<  111<H>  3.7   |  20<L>  |  0.89    Phos  3.5     09-12  Mg     1.50     09-12    TPro  6.9  /  Alb  x   /  TBili  x   /  DBili  x   /  AST  x   /  ALT  x   /  AlkPhos  x   09-12      Serum Cr trend: 0.89 <-- , 0.89 <-- , 0.93 <--

## 2021-09-13 NOTE — PROGRESS NOTE ADULT - PROBLEM SELECTOR PLAN 1
- subjective b/l LE weakness without objective findings associated w/lower back pain over the past several months; multiple previous admissions for similar presentation. labs without electrolyte derangement; no signs of infection on labs or subjective; previous MR C/T/L spine 5/2021 and 6/2021 without acute findings; no trauma, falls, or worsening in LE weakness since these previous imaging studies  - neuro following; recommending MR head, C/T spine inpatient or outpatient; pending d/w neuro attending  - f/u MG w/u as pt w/ptosis; f/u TSH, MUSK, acHR-antibodies. Further serologic w/u per neuro: SHAI, lipid panel, a1c - subjective b/l LE weakness without objective findings associated w/lower back pain over the past several months; multiple previous admissions for similar presentation. labs without electrolyte derangement; no signs of infection on labs or subjective; previous MR C/T/L spine 5/2021 and 6/2021 without acute findings; no trauma, falls, or worsening in LE weakness since these previous imaging studies  - f/u MG w/u as pt w/ptosis; f/u TSH, MUSK, acHR-antibodies. Further serologic w/u per neuro: SHAI, lipid panel, a1c - subjective b/l LE weakness without objective findings associated w/lower back pain over the past several months; multiple previous admissions for similar presentation. labs without electrolyte derangement; no signs of infection on labs or subjective; previous MR C/T/L spine 5/2021 and 6/2021 without acute findings; no trauma, falls, or worsening in LE weakness since these previous imaging studies  - f/u MG w/u as pt w/ptosis; f/u TSH, MUSK, acHR-antibodies. Further serologic w/u per neuro: SHAI, lipid panel, a1c  -Collect serum CK, homocysteine, methylmalonic acid, SPEP - subjective b/l LE weakness without objective findings associated w/lower back pain over the past several months; multiple previous admissions for similar presentation. labs without electrolyte derangement; no signs of infection on labs or subjective; previous MR C/T/L spine 5/2021 and 6/2021 without acute findings; no trauma, falls, or worsening in LE weakness since these previous imaging studies  -PMR consult called, will evaluate tomorrow  - f/u MG w/u as pt w/ptosis; f/u TSH, MUSK, acHR-antibodies. Further serologic w/u per neuro: SHAI, lipid panel, a1c  -Collect serum CK, homocysteine, methylmalonic acid, SPEP

## 2021-09-13 NOTE — PROGRESS NOTE ADULT - PROBLEM SELECTOR PLAN 4
DVT ppx: lovenox  Diet: regular  Dispo- PT evaluation DVT ppx: lovenox  Diet: regular  Dispo- PT evaluation, social work

## 2021-09-14 LAB
% ALBUMIN: 47.9 % — SIGNIFICANT CHANGE UP
% ALPHA 1: 3 % — SIGNIFICANT CHANGE UP
% ALPHA 2: 14.2 % — SIGNIFICANT CHANGE UP
% BETA: 12.6 % — SIGNIFICANT CHANGE UP
% GAMMA: 22.3 % — SIGNIFICANT CHANGE UP
ACHR BLOCK AB SER-ACNC: 15 % — SIGNIFICANT CHANGE UP (ref 0–25)
ALBUMIN SERPL ELPH-MCNC: 3.31 G/DL — SIGNIFICANT CHANGE UP (ref 3.3–4.4)
ALBUMIN SERPL ELPH-MCNC: 3.8 G/DL — SIGNIFICANT CHANGE UP (ref 3.3–5)
ALBUMIN/GLOB SERPL ELPH: 0.9 RATIO — SIGNIFICANT CHANGE UP
ALP SERPL-CCNC: 69 U/L — SIGNIFICANT CHANGE UP (ref 40–120)
ALPHA1 GLOB SERPL ELPH-MCNC: 0.21 G/DL — SIGNIFICANT CHANGE UP (ref 0.1–0.3)
ALPHA2 GLOB SERPL ELPH-MCNC: 1 G/DL — SIGNIFICANT CHANGE UP (ref 0.6–1)
ALT FLD-CCNC: 15 U/L — SIGNIFICANT CHANGE UP (ref 4–41)
ANION GAP SERPL CALC-SCNC: 14 MMOL/L — SIGNIFICANT CHANGE UP (ref 7–14)
AST SERPL-CCNC: 19 U/L — SIGNIFICANT CHANGE UP (ref 4–40)
B-GLOBULIN SERPL ELPH-MCNC: 0.87 G/DL — SIGNIFICANT CHANGE UP (ref 0.6–1.1)
BASOPHILS # BLD AUTO: 0 K/UL — SIGNIFICANT CHANGE UP (ref 0–0.2)
BASOPHILS NFR BLD AUTO: 0 % — SIGNIFICANT CHANGE UP (ref 0–2)
BILIRUB SERPL-MCNC: 0.4 MG/DL — SIGNIFICANT CHANGE UP (ref 0.2–1.2)
BUN SERPL-MCNC: 10 MG/DL — SIGNIFICANT CHANGE UP (ref 7–23)
CALCIUM SERPL-MCNC: 9.3 MG/DL — SIGNIFICANT CHANGE UP (ref 8.4–10.5)
CHLORIDE SERPL-SCNC: 104 MMOL/L — SIGNIFICANT CHANGE UP (ref 98–107)
CO2 SERPL-SCNC: 21 MMOL/L — LOW (ref 22–31)
CREAT SERPL-MCNC: 0.93 MG/DL — SIGNIFICANT CHANGE UP (ref 0.5–1.3)
EOSINOPHIL # BLD AUTO: 0.15 K/UL — SIGNIFICANT CHANGE UP (ref 0–0.5)
EOSINOPHIL NFR BLD AUTO: 4.3 % — SIGNIFICANT CHANGE UP (ref 0–6)
GAMMA GLOBULIN: 1.54 G/DL — SIGNIFICANT CHANGE UP (ref 0.7–1.7)
GIANT PLATELETS BLD QL SMEAR: PRESENT — SIGNIFICANT CHANGE UP
GLUCOSE SERPL-MCNC: 123 MG/DL — HIGH (ref 70–99)
HCT VFR BLD CALC: 41 % — SIGNIFICANT CHANGE UP (ref 39–50)
HGB BLD-MCNC: 13.4 G/DL — SIGNIFICANT CHANGE UP (ref 13–17)
IANC: 0.77 K/UL — LOW (ref 1.5–8.5)
LYMPHOCYTES # BLD AUTO: 1.84 K/UL — SIGNIFICANT CHANGE UP (ref 1–3.3)
LYMPHOCYTES # BLD AUTO: 53.4 % — HIGH (ref 13–44)
MAGNESIUM SERPL-MCNC: 1.5 MG/DL — LOW (ref 1.6–2.6)
MCHC RBC-ENTMCNC: 28.6 PG — SIGNIFICANT CHANGE UP (ref 27–34)
MCHC RBC-ENTMCNC: 32.7 GM/DL — SIGNIFICANT CHANGE UP (ref 32–36)
MCV RBC AUTO: 87.4 FL — SIGNIFICANT CHANGE UP (ref 80–100)
MONOCYTES # BLD AUTO: 0.2 K/UL — SIGNIFICANT CHANGE UP (ref 0–0.9)
MONOCYTES NFR BLD AUTO: 5.9 % — SIGNIFICANT CHANGE UP (ref 2–14)
NEUTROPHILS # BLD AUTO: 0.93 K/UL — LOW (ref 1.8–7.4)
NEUTROPHILS NFR BLD AUTO: 27.1 % — LOW (ref 43–77)
PHOSPHATE SERPL-MCNC: 4 MG/DL — SIGNIFICANT CHANGE UP (ref 2.5–4.5)
PLAT MORPH BLD: NORMAL — SIGNIFICANT CHANGE UP
PLATELET # BLD AUTO: 244 K/UL — SIGNIFICANT CHANGE UP (ref 150–400)
PLATELET COUNT - ESTIMATE: NORMAL — SIGNIFICANT CHANGE UP
POTASSIUM SERPL-MCNC: 3.8 MMOL/L — SIGNIFICANT CHANGE UP (ref 3.5–5.3)
POTASSIUM SERPL-SCNC: 3.8 MMOL/L — SIGNIFICANT CHANGE UP (ref 3.5–5.3)
PROT PATTERN SERPL ELPH-IMP: SIGNIFICANT CHANGE UP
PROT SERPL-MCNC: 6.7 G/DL — SIGNIFICANT CHANGE UP (ref 6–8.3)
PROT SERPL-MCNC: 6.9 G/DL — SIGNIFICANT CHANGE UP
RBC # BLD: 4.69 M/UL — SIGNIFICANT CHANGE UP (ref 4.2–5.8)
RBC # FLD: 12.7 % — SIGNIFICANT CHANGE UP (ref 10.3–14.5)
RBC BLD AUTO: NORMAL — SIGNIFICANT CHANGE UP
SARS-COV-2 RNA SPEC QL NAA+PROBE: SIGNIFICANT CHANGE UP
SODIUM SERPL-SCNC: 139 MMOL/L — SIGNIFICANT CHANGE UP (ref 135–145)
VARIANT LYMPHS # BLD: 9.3 % — HIGH (ref 0–6)
WBC # BLD: 3.45 K/UL — LOW (ref 3.8–10.5)
WBC # FLD AUTO: 3.45 K/UL — LOW (ref 3.8–10.5)

## 2021-09-14 PROCEDURE — 99222 1ST HOSP IP/OBS MODERATE 55: CPT

## 2021-09-14 PROCEDURE — 99232 SBSQ HOSP IP/OBS MODERATE 35: CPT | Mod: GC

## 2021-09-14 RX ORDER — MAGNESIUM SULFATE 500 MG/ML
2 VIAL (ML) INJECTION ONCE
Refills: 0 | Status: COMPLETED | OUTPATIENT
Start: 2021-09-14 | End: 2021-09-14

## 2021-09-14 RX ADMIN — GABAPENTIN 300 MILLIGRAM(S): 400 CAPSULE ORAL at 21:58

## 2021-09-14 RX ADMIN — BICTEGRAVIR SODIUM, EMTRICITABINE, AND TENOFOVIR ALAFENAMIDE FUMARATE 1 TABLET(S): 30; 120; 15 TABLET ORAL at 12:51

## 2021-09-14 RX ADMIN — Medication 15 MILLIGRAM(S): at 14:30

## 2021-09-14 RX ADMIN — LIDOCAINE 1 PATCH: 4 CREAM TOPICAL at 12:52

## 2021-09-14 RX ADMIN — Medication 1000 UNIT(S): at 12:51

## 2021-09-14 RX ADMIN — QUETIAPINE FUMARATE 200 MILLIGRAM(S): 200 TABLET, FILM COATED ORAL at 21:54

## 2021-09-14 RX ADMIN — LIDOCAINE 1 PATCH: 4 CREAM TOPICAL at 00:51

## 2021-09-14 RX ADMIN — GABAPENTIN 300 MILLIGRAM(S): 400 CAPSULE ORAL at 06:11

## 2021-09-14 RX ADMIN — GABAPENTIN 300 MILLIGRAM(S): 400 CAPSULE ORAL at 14:31

## 2021-09-14 RX ADMIN — Medication 50 GRAM(S): at 10:30

## 2021-09-14 RX ADMIN — LIDOCAINE 1 PATCH: 4 CREAM TOPICAL at 22:10

## 2021-09-14 RX ADMIN — Medication 3 MILLIGRAM(S): at 21:56

## 2021-09-14 RX ADMIN — Medication 15 MILLIGRAM(S): at 06:11

## 2021-09-14 RX ADMIN — LIDOCAINE 1 PATCH: 4 CREAM TOPICAL at 01:16

## 2021-09-14 RX ADMIN — Medication 15 MILLIGRAM(S): at 21:55

## 2021-09-14 NOTE — CONSULT NOTE ADULT - SUBJECTIVE AND OBJECTIVE BOX
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 Denies any fevers, chills, trauma, or falls. Pt at baseline walks w/walker and cane for ambulation; and is adherent to medications. Denies any  associated symptoms of fevers, or chills. Ambulates w/walker at baseline.    Per chart review, pt was admitted 5/2021 for LE weakness at which point he had extensive imaging including MRI C/T/L-spine, which showed no acute concerning findings.    In the ED, pt afebrile, HR 60-70s, -140s, SaO2 99 RA. S/p baclofen and gabapentin (10 Sep 2021 04:46)      REVIEW OF SYSTEMS  Constitutional - No fever, No weight loss, No fatigue  HEENT - No eye pain, No visual disturbances, No difficulty hearing, No tinnitus, No vertigo, No neck pain  Respiratory - No cough, No wheezing, No shortness of breath  Cardiovascular - No chest pain, No palpitations  Gastrointestinal - No abdominal pain, No nausea, No vomiting, No diarrhea, No constipation  Genitourinary - No dysuria, No frequency, No hematuria, No incontinence  Neurological - No headaches, No memory loss, No loss of strength, No numbness, No tremors  Skin - No itching, No rashes, No lesions   Endocrine - No temperature intolerance  Musculoskeletal - No joint pain, No joint swelling, No muscle pain  Psychiatric - No depression, No anxiety    PAST MEDICAL & SURGICAL HISTORY  HIV (human immunodeficiency virus infection)    Guillain-[place]    Asthma    HIV (human immunodeficiency virus infection)    CVA (cerebral vascular accident)    Closed fracture of multiple ribs of right side, initial encounter    Cocaine abuse    Chronic sinusitis    Homeless    HIV disease    Stroke    History of orthopedic surgery       SOCIAL HISTORY   Drugs -+ abuse history per chart    FUNCTIONAL HISTORY  per chart lives in a hotel, ambulates with cane and RW, with assistance needed for ADLs     CURRENT FUNCTIONAL STATUS  deferred PT session on 9/13    9/10  Bed Mobility: Supine to Sit:     · Level of [place]	contact guard  · Physical Assist/Nonphysical Assist	1 person assist; nonverbal cues (demo/gestures); verbal cues  · Assistive Device	bed rails    Bed Mobility Analysis:     · Impairments Contributing to Impaired Bed Mobility	impaired balance; decreased strength    Transfer: Sit to Stand:     · Level of [place]	moderate assist (50% patients effort)  · Physical Assist/Nonphysical Assist	1 person assist; nonverbal cues (demo/gestures); verbal cues  · Weight-Bearing Restrictions	weight-bearing as tolerated  · Assistive Device	rolling walker    Transfer: Stand to Sit:     · Level of [place]	moderate assist (50% patients effort)  · Physical Assist/Nonphysical Assist	1 person assist; nonverbal cues (demo/gestures); verbal cues  · Weight-Bearing Restrictions	weight-bearing as tolerated  · Assistive Device	rolling walker    Sit/Stand Transfer Safety Analysis:     · Impairments Contributing to Impaired Transfers	impaired balance; decreased strength    Gait Skills:     · Level of [place]	moderate assist (50% patients effort)  · Physical Assist/Nonphysical Assist	1 person assist; nonverbal cues (demo/gestures); verbal cues  · Weight-Bearing Restrictions	weight-bearing as tolerated  · Assistive Device	rolling walker  · Gait Distance	15 feet    Gait Analysis:     · Gait Pattern Used	3-point gait  · Gait Deviations Noted	decreased cabrera; increased time in double stance; decreased step length; decreased stride length; decreased weight-shifting ability  · Impairments Contributing to Gait Deviations	impaired balance; decreased strength        FAMILY HISTORY   FH: HIV infection (Mother)    RECENT LABS/IMAGING  CBC Full  -  ( 14 Sep 2021 07:52 )  WBC Count : 3.45 K/uL  RBC Count : 4.69 M/uL  Hemoglobin : 13.4 g/dL  Hematocrit : 41.0 %  Platelet Count - Automated : 244 K/uL  Mean Cell Volume : 87.4 fL  Mean Cell Hemoglobin : 28.6 pg  Mean Cell Hemoglobin Concentration : 32.7 gm/dL  Auto Neutrophil # : 0.93 K/uL  Auto Lymphocyte # : 1.84 K/uL  Auto Monocyte # : 0.20 K/uL  Auto Eosinophil # : 0.15 K/uL  Auto Basophil # : 0.00 K/uL  Auto Neutrophil % : 27.1 %  Auto Lymphocyte % : 53.4 %  Auto Monocyte % : 5.9 %  Auto Eosinophil % : 4.3 %  Auto Basophil % : 0.0 %    09-14    139  |  104  |  10  ----------------------------<  123<H>  3.8   |  21<L>  |  0.93    Ca    9.3      14 Sep 2021 07:52  Phos  4.0     09-14  Mg     1.50     09-14    TPro  6.7  /  Alb  3.8  /  TBili  0.4  /  DBili  x   /  AST  19  /  ALT  15  /  AlkPhos  69  09-14        VITALS  T(C): 36.7 (09-14-21 @ 06:10), Max: 36.7 (09-14-21 @ 06:10)  HR: 72 (09-14-21 @ 06:10) (62 - 72)  BP: 108/62 (09-14-21 @ 06:10) (108/62 - 136/79)  RR: 18 (09-14-21 @ 06:10) (18 - 18)  SpO2: 97% (09-14-21 @ 06:10) (95% - 97%)  Wt(kg): --    ALLERGIES  Ceclor (Unknown)  Toradol (Anaphylaxis; Swelling)  Toradol (Swelling)      MEDICATIONS   baclofen 15 milliGRAM(s) Oral three times a day  bictegravir 50 mG/emtricitabine 200 mG/tenofovir alafenamide 25 mG (BIKTARVY) 1 Tablet(s) Oral daily  cholecalciferol 1000 Unit(s) Oral daily  enoxaparin Injectable 40 milliGRAM(s) SubCutaneous daily  gabapentin 300 milliGRAM(s) Oral three times a day  lidocaine   4% Patch 1 Patch Transdermal daily  LORazepam   Injectable 1 milliGRAM(s) IV Push once PRN  melatonin 3 milliGRAM(s) Oral at bedtime PRN  QUEtiapine 200 milliGRAM(s) Oral at bedtime  sodium chloride 0.9%. 1000 milliLiter(s) IV Continuous <Continuous>      ----------------------------------------------------------------------------------------  PHYSICAL EXAM  - pending  Constitutional - NAD, Comfortable  HEENT - NCAT, EOMI  Neck - Supple, No limited ROM  Chest - CTA bilaterally, No wheeze, No rhonchi, No crackles  Cardiovascular - RRR, S1S2, No murmurs  Abdomen - BS+, Soft, NTND  Extremities - No C/C/E, No calf tenderness   Neurologic Exam -                    Cognitive - Awake, Alert, AAO to self, place, date, year, situation     Communication - Fluent, No dysarthria     Cranial Nerves - CN 2-12 intact     Motor -                      LEFT    UE - ShAB 5/5, EF 5/5, EE 5/5, WE 5/5,  5/5                    RIGHT UE - ShAB 5/5, EF 5/5, EE 5/5, WE 5/5,  5/5                    LEFT    LE - HF 5/5, KE 5/5, DF 5/5, PF 5/5                    RIGHT LE - HF 5/5, KE 5/5, DF 5/5, PF 5/5        Sensory - Intact to LT     Reflexes - DTR Intact, No primitive reflexive     Coordination - FTN intact     OculoVestibular - No saccades, No nystagmus, VOR         Balance - WNL Static  Psychiatric - Mood stable, Affect WNL  ----------------------------------------------------------------------------------------  ASSESSMENT/PLAN    Pain -gabapentin, lidocaine patch   DVT PPX - lovenox  HIV: Biktarvy   Diet: regular  Rehab -     incomplete note Patient is a 31y old  Male who presents with a chief complaint of LE weakness (14 Sep 2021 08:02)      HPI:  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 Denies any fevers, chills, trauma, or falls. Pt at baseline walks w/walker and cane for ambulation; and is adherent to medications. Denies any  associated symptoms of fevers, or chills. Ambulates w/walker at baseline.    Per chart review, pt was admitted 5/2021 for LE weakness at which point he had extensive imaging including MRI C/T/L-spine, which showed no acute concerning findings.    In the ED, pt afebrile, HR 60-70s, -140s, SaO2 99 RA. S/p baclofen and gabapentin (10 Sep 2021 04:46)      REVIEW OF SYSTEMS  Constitutional - No fever, No weight loss, No fatigue  HEENT - No eye pain, No visual disturbances, No difficulty hearing, No tinnitus, No vertigo, No neck pain  Respiratory - No cough, No wheezing, No shortness of breath  Cardiovascular - No chest pain, No palpitations  Gastrointestinal - No abdominal pain, No nausea, No vomiting, No diarrhea, No constipation  Genitourinary - No dysuria, No frequency, No hematuria, No incontinence  Neurological - No headaches, No memory loss, + loss of strength, No numbness, No tremors  Skin - No itching, No rashes, No lesions   Endocrine - No temperature intolerance  Musculoskeletal - No joint pain, No joint swelling, No muscle pain  Psychiatric - No depression, No anxiety    PAST MEDICAL & SURGICAL HISTORY  HIV (human immunodeficiency virus infection)    Guillain-[place]    Asthma    HIV (human immunodeficiency virus infection)    CVA (cerebral vascular accident)    Closed fracture of multiple ribs of right side, initial encounter    Cocaine abuse    Chronic sinusitis    Homeless    HIV disease    Stroke    History of orthopedic surgery       SOCIAL HISTORY   Drugs -+ abuse history per chart    FUNCTIONAL HISTORY  per chart lives in a hotel, ambulates with cane and RW, with assistance needed for ADLs     CURRENT FUNCTIONAL STATUS  deferred PT session on 9/13    9/10  Bed Mobility: Supine to Sit:     · Level of [place]	contact guard  · Physical Assist/Nonphysical Assist	1 person assist; nonverbal cues (demo/gestures); verbal cues  · Assistive Device	bed rails    Bed Mobility Analysis:     · Impairments Contributing to Impaired Bed Mobility	impaired balance; decreased strength    Transfer: Sit to Stand:     · Level of [place]	moderate assist (50% patients effort)  · Physical Assist/Nonphysical Assist	1 person assist; nonverbal cues (demo/gestures); verbal cues  · Weight-Bearing Restrictions	weight-bearing as tolerated  · Assistive Device	rolling walker    Transfer: Stand to Sit:     · Level of [place]	moderate assist (50% patients effort)  · Physical Assist/Nonphysical Assist	1 person assist; nonverbal cues (demo/gestures); verbal cues  · Weight-Bearing Restrictions	weight-bearing as tolerated  · Assistive Device	rolling walker    Sit/Stand Transfer Safety Analysis:     · Impairments Contributing to Impaired Transfers	impaired balance; decreased strength    Gait Skills:     · Level of [place]	moderate assist (50% patients effort)  · Physical Assist/Nonphysical Assist	1 person assist; nonverbal cues (demo/gestures); verbal cues  · Weight-Bearing Restrictions	weight-bearing as tolerated  · Assistive Device	rolling walker  · Gait Distance	15 feet    Gait Analysis:     · Gait Pattern Used	3-point gait  · Gait Deviations Noted	decreased cabrera; increased time in double stance; decreased step length; decreased stride length; decreased weight-shifting ability  · Impairments Contributing to Gait Deviations	impaired balance; decreased strength        FAMILY HISTORY   FH: HIV infection (Mother)    RECENT LABS/IMAGING  CBC Full  -  ( 14 Sep 2021 07:52 )  WBC Count : 3.45 K/uL  RBC Count : 4.69 M/uL  Hemoglobin : 13.4 g/dL  Hematocrit : 41.0 %  Platelet Count - Automated : 244 K/uL  Mean Cell Volume : 87.4 fL  Mean Cell Hemoglobin : 28.6 pg  Mean Cell Hemoglobin Concentration : 32.7 gm/dL  Auto Neutrophil # : 0.93 K/uL  Auto Lymphocyte # : 1.84 K/uL  Auto Monocyte # : 0.20 K/uL  Auto Eosinophil # : 0.15 K/uL  Auto Basophil # : 0.00 K/uL  Auto Neutrophil % : 27.1 %  Auto Lymphocyte % : 53.4 %  Auto Monocyte % : 5.9 %  Auto Eosinophil % : 4.3 %  Auto Basophil % : 0.0 %    09-14    139  |  104  |  10  ----------------------------<  123<H>  3.8   |  21<L>  |  0.93    Ca    9.3      14 Sep 2021 07:52  Phos  4.0     09-14  Mg     1.50     09-14    TPro  6.7  /  Alb  3.8  /  TBili  0.4  /  DBili  x   /  AST  19  /  ALT  15  /  AlkPhos  69  09-14        VITALS  T(C): 36.7 (09-14-21 @ 06:10), Max: 36.7 (09-14-21 @ 06:10)  HR: 72 (09-14-21 @ 06:10) (62 - 72)  BP: 108/62 (09-14-21 @ 06:10) (108/62 - 136/79)  RR: 18 (09-14-21 @ 06:10) (18 - 18)  SpO2: 97% (09-14-21 @ 06:10) (95% - 97%)  Wt(kg): --    ALLERGIES  Ceclor (Unknown)  Toradol (Anaphylaxis; Swelling)  Toradol (Swelling)      MEDICATIONS   baclofen 15 milliGRAM(s) Oral three times a day  bictegravir 50 mG/emtricitabine 200 mG/tenofovir alafenamide 25 mG (BIKTARVY) 1 Tablet(s) Oral daily  cholecalciferol 1000 Unit(s) Oral daily  enoxaparin Injectable 40 milliGRAM(s) SubCutaneous daily  gabapentin 300 milliGRAM(s) Oral three times a day  lidocaine   4% Patch 1 Patch Transdermal daily  LORazepam   Injectable 1 milliGRAM(s) IV Push once PRN  melatonin 3 milliGRAM(s) Oral at bedtime PRN  QUEtiapine 200 milliGRAM(s) Oral at bedtime  sodium chloride 0.9%. 1000 milliLiter(s) IV Continuous <Continuous>      ----------------------------------------------------------------------------------------  PHYSICAL EXAM  -    Constitutional - NAD, Comfortable  HEENT - NCAT, EOMI  Neck - Supple, No limited ROM  Chest - no labored breathing  Cardiovascular - RRR, S1S2   Abdomen -  Soft, NTND  Extremities - No C/C/E, No calf tenderness   Neurologic Exam -                    Cognitive - Awake, Alert, AAO to self, place, date, year, situation     Communication - Fluent, No dysarthria     Cranial Nerves - CN 2-12 intact     Motor -                      LEFT    UE - ShAB 5/5, EF 5/5, EE 5/5, WE 5/5,  5/5                    RIGHT UE - ShAB 5/5, EF 5/5, EE 5/5, WE 5/5,  5/5                    LEFT    LE - HF 2/5, KE 2/5, DF 3/5, PF 3/5                    RIGHT LE - HF 3/5, KE 3/5, DF 3/5, PF 3/5        Sensory - Intact to LT       OculoVestibular - No saccades, No nystagmus, VOR         Balance - WNL Static  Psychiatric - Mood stable, Affect WNL  ----------------------------------------------------------------------------------------  ASSESSMENT/PLAN  32 yo m pmh congenital HIV, Guillian Perkins syndrome presented with LE weakness and back pain.      Pain -gabapentin, lidocaine patch   DVT PPX - lovenox  HIV: Biktarvy   Diet: regular  recommend OT evaluation  continue bedside PT  patient able to stand with assistance   Rehab -   recommend inpatient rehab when medically cleared. Patient can tolerate 3 hrs/day however due to homelessness, does not have dispo for acute rehab.    Would benefit from subacute rehab when medically cleared.

## 2021-09-14 NOTE — PROGRESS NOTE ADULT - ASSESSMENT
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 MG. Awaiting transfer to rehab for continuation of care.

## 2021-09-14 NOTE — PROGRESS NOTE ADULT - PROBLEM SELECTOR PLAN 4
DVT ppx: lovenox  Diet: regular  Dispo- PT evaluation, social work DVT ppx: lovenox  Diet: regular  Dispo- PT evaluation, social work, likely dispo to acute rehab

## 2021-09-14 NOTE — PROGRESS NOTE ADULT - SUBJECTIVE AND OBJECTIVE BOX
Patient:  NELSON MITCHELL  [id number]    Progress Note    Interval events: No acute events.  Pertinent ROS (if any):      Administered:  gabapentin: 300 milliGRAM(s) Oral (09-14 @ 06:11)  baclofen: 15 milliGRAM(s) Oral (09-14 @ 06:11)  (ADM OVERRIDE): 2 Each &lt;See Task&gt; (09-14 @ 05:17)  melatonin: 3 milliGRAM(s) Oral (09-13 @ 21:41)  QUEtiapine: 200 milliGRAM(s) Oral (09-13 @ 21:41)  gabapentin: 300 milliGRAM(s) Oral (09-13 @ 21:40)  baclofen: 15 milliGRAM(s) Oral (09-13 @ 21:40)        OBJECTIVE:    09-13 @ 07:01  -  09-14 @ 07:00  --------------------------------------------------------  IN: 950 mL / OUT: 1900 mL / NET: -950 mL      CAPILLARY BLOOD GLUCOSE            VITALS:  T(F): 98.1 (09-14-21 @ 06:10), Max: 98.1 (09-14-21 @ 06:10)  HR: 72 (09-14-21 @ 06:10) (62 - 72)  BP: 108/62 (09-14-21 @ 06:10) (108/62 - 136/79)  BP(mean): --  ABP: --  ABP(mean): --  RR: 18 (09-14-21 @ 06:10) (18 - 18)  SpO2: 97% (09-14-21 @ 06:10) (95% - 97%)    PHYSICAL EXAM:  GENERAL: NAD, lying in bed comfortably  HEAD:  Atraumatic, Normocephalic  EYES: EOMI, PERRLA, conjunctiva and sclera clear  ENT: Moist mucous membranes  NECK: Supple, No JVD  CHEST/LUNG: Clear to auscultation bilaterally; No rales, rhonchi, wheezing, or rubs. Unlabored respirations  HEART: Regular rate and rhythm; No murmurs, rubs, or gallops  ABDOMEN: Bowel sounds present; Soft, Nontender, Nondistended. No hepatomegaly  EXTREMITIES:  2+ Peripheral Pulses, brisk capillary refill. No clubbing, cyanosis, or edema  NERVOUS SYSTEM:  Alert & Oriented X3, speech clear. No deficits   MSK: FROM all 4 extremities, full and equal strength  SKIN: No rashes or lesions    HOSPITAL MEDICATIONS:  Standing Meds:  baclofen 15 milliGRAM(s) Oral three times a day  bictegravir 50 mG/emtricitabine 200 mG/tenofovir alafenamide 25 mG (BIKTARVY) 1 Tablet(s) Oral daily  cholecalciferol 1000 Unit(s) Oral daily  enoxaparin Injectable 40 milliGRAM(s) SubCutaneous daily  gabapentin 300 milliGRAM(s) Oral three times a day  lidocaine   4% Patch 1 Patch Transdermal daily  QUEtiapine 200 milliGRAM(s) Oral at bedtime  sodium chloride 0.9%. 1000 milliLiter(s) IV Continuous <Continuous>      PRN Meds:  LORazepam   Injectable 1 milliGRAM(s) IV Push once PRN  melatonin 3 milliGRAM(s) Oral at bedtime PRN      LABS:  CBC 09-13-21 @ 13:37                        13.9   4.28  )-----------( 251                   42.4       Hgb trend: 13.9 <--   WBC trend: 4.28 <--       CMP 09-13-21 @ 13:37    137  |  101  |  11  ----------------------------<  102<H>  4.1   |  22  |  0.92    Ca    9.4      09-13-21 @ 13:37  Phos  2.2     09-13  Mg     1.70     09-13    TPro  7.3  /  Alb  x   /  TBili  x   /  DBili  x   /  AST  x   /  ALT  x   /  AlkPhos  x   09-13      Serum Cr trend: 0.92 <-- , 0.89 <--         ABG Trend:     VBG Trend:       MICROBIOLOGY:       RADIOLOGY:  [ ] Reviewed and interpreted by me    EKG:   Patient:  NELSON MITCHELL  [id number]    Mr. 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     Progress Note    Interval events: No acute events overnight. Patient continues to be in pain with episodes of spasms and weakness without improvement. He tried walking to the bathroom twice yesterday with assistance from the nurse - while he was able to do so, he found it very difficult. No headache, chest pain, difficulty breathing, vision changes.     Administered:  gabapentin: 300 milliGRAM(s) Oral (09-14 @ 06:11)  baclofen: 15 milliGRAM(s) Oral (09-14 @ 06:11)  (ADM OVERRIDE): 2 Each &lt;See Task&gt; (09-14 @ 05:17)  melatonin: 3 milliGRAM(s) Oral (09-13 @ 21:41)  QUEtiapine: 200 milliGRAM(s) Oral (09-13 @ 21:41)  gabapentin: 300 milliGRAM(s) Oral (09-13 @ 21:40)  baclofen: 15 milliGRAM(s) Oral (09-13 @ 21:40)        OBJECTIVE:    09-13 @ 07:01  -  09-14 @ 07:00  --------------------------------------------------------  IN: 950 mL / OUT: 1900 mL / NET: -950 mL      CAPILLARY BLOOD GLUCOSE            VITALS:  T(F): 98.1 (09-14-21 @ 06:10), Max: 98.1 (09-14-21 @ 06:10)  HR: 72 (09-14-21 @ 06:10) (62 - 72)  BP: 108/62 (09-14-21 @ 06:10) (108/62 - 136/79)  BP(mean): --  ABP: --  ABP(mean): --  RR: 18 (09-14-21 @ 06:10) (18 - 18)  SpO2: 97% (09-14-21 @ 06:10) (95% - 97%)    PHYSICAL EXAM:  GENERAL: NAD, lying in bed comfortably  HEAD:  Atraumatic, Normocephalic  EYES: EOMI, PERRLA, conjunctiva and sclera clear  ENT: Moist mucous membranes  NECK: Supple, No JVD  CHEST/LUNG: Clear to auscultation bilaterally; No rales, rhonchi, wheezing, or rubs. Unlabored respirations  HEART: Regular rate and rhythm; No murmurs, rubs, or gallops  ABDOMEN: Bowel sounds present; Soft, Nontender, Nondistended. No hepatomegaly  EXTREMITIES:  2+ Peripheral Pulses, brisk capillary refill. No clubbing, cyanosis, or edema  NERVOUS SYSTEM:  Alert & Oriented X3, speech clear. No deficits   MSK: FROM all 4 extremities, full and equal strength  SKIN: No rashes or lesions    HOSPITAL MEDICATIONS:  Standing Meds:  baclofen 15 milliGRAM(s) Oral three times a day  bictegravir 50 mG/emtricitabine 200 mG/tenofovir alafenamide 25 mG (BIKTARVY) 1 Tablet(s) Oral daily  cholecalciferol 1000 Unit(s) Oral daily  enoxaparin Injectable 40 milliGRAM(s) SubCutaneous daily  gabapentin 300 milliGRAM(s) Oral three times a day  lidocaine   4% Patch 1 Patch Transdermal daily  QUEtiapine 200 milliGRAM(s) Oral at bedtime  sodium chloride 0.9%. 1000 milliLiter(s) IV Continuous <Continuous>      PRN Meds:  LORazepam   Injectable 1 milliGRAM(s) IV Push once PRN  melatonin 3 milliGRAM(s) Oral at bedtime PRN      LABS:  CBC 09-13-21 @ 13:37                        13.9   4.28  )-----------( 251                   42.4       Hgb trend: 13.9 <--   WBC trend: 4.28 <--       CMP 09-13-21 @ 13:37    137  |  101  |  11  ----------------------------<  102<H>  4.1   |  22  |  0.92    Ca    9.4      09-13-21 @ 13:37  Phos  2.2     09-13  Mg     1.70     09-13    TPro  7.3  /  Alb  x   /  TBili  x   /  DBili  x   /  AST  x   /  ALT  x   /  AlkPhos  x   09-13      Serum Cr trend: 0.92 <-- , 0.89 <--         ABG Trend:     VBG Trend:       MICROBIOLOGY:       RADIOLOGY:  [ ] Reviewed and interpreted by me    EKG:   Patient:  NELSON MITCHELL  [id number]    Mr. 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     Progress Note    Interval events: No acute events overnight. Patient continues to be in pain with episodes of spasms and weakness without improvement. He tried walking to the bathroom twice yesterday with assistance from the nurse - while he was able to do so, he found it very difficult. No headache, chest pain, difficulty breathing, vision changes.     Administered:  gabapentin: 300 milliGRAM(s) Oral (09-14 @ 06:11)  baclofen: 15 milliGRAM(s) Oral (09-14 @ 06:11)  (ADM OVERRIDE): 2 Each &lt;See Task&gt; (09-14 @ 05:17)  melatonin: 3 milliGRAM(s) Oral (09-13 @ 21:41)  QUEtiapine: 200 milliGRAM(s) Oral (09-13 @ 21:41)  gabapentin: 300 milliGRAM(s) Oral (09-13 @ 21:40)  baclofen: 15 milliGRAM(s) Oral (09-13 @ 21:40)        OBJECTIVE:    09-13 @ 07:01  -  09-14 @ 07:00  --------------------------------------------------------  IN: 950 mL / OUT: 1900 mL / NET: -950 mL      CAPILLARY BLOOD GLUCOSE            VITALS:  T(F): 98.1 (09-14-21 @ 06:10), Max: 98.1 (09-14-21 @ 06:10)  HR: 72 (09-14-21 @ 06:10) (62 - 72)  BP: 108/62 (09-14-21 @ 06:10) (108/62 - 136/79)  BP(mean): --  ABP: --  ABP(mean): --  RR: 18 (09-14-21 @ 06:10) (18 - 18)  SpO2: 97% (09-14-21 @ 06:10) (95% - 97%)    PHYSICAL EXAM:  GENERAL: NAD  EYES: EOMI, PERRLA, conjunctiva and sclera clear  NECK:  No JVD  CHEST/LUNG: CTABL ; No wheeze  HEART: RRR; No murmurs  ABDOMEN: Soft, Nontender, Nondistended; Bowel sounds present  : No Brizuela  EXTREMITIES:  2+ Peripheral Pulses, No edema  PSYCH: AAOx3  NEUROLOGY: b/l LE weakness 1-2/5, more prominent on left. Pain on palpation of left calf. b/l Ptosis. CN II-XII intact. +hyperreflexia. Upper extremities normal   SKIN: No rashes or lesions. No sacral ulcer    HOSPITAL MEDICATIONS:  Standing Meds:  baclofen 15 milliGRAM(s) Oral three times a day  bictegravir 50 mG/emtricitabine 200 mG/tenofovir alafenamide 25 mG (BIKTARVY) 1 Tablet(s) Oral daily  cholecalciferol 1000 Unit(s) Oral daily  enoxaparin Injectable 40 milliGRAM(s) SubCutaneous daily  gabapentin 300 milliGRAM(s) Oral three times a day  lidocaine   4% Patch 1 Patch Transdermal daily  QUEtiapine 200 milliGRAM(s) Oral at bedtime  sodium chloride 0.9%. 1000 milliLiter(s) IV Continuous <Continuous>      PRN Meds:  LORazepam   Injectable 1 milliGRAM(s) IV Push once PRN  melatonin 3 milliGRAM(s) Oral at bedtime PRN      .  LABS:                         13.4   3.45  )-----------( 244      ( 14 Sep 2021 07:52 )             41.0     09-14    139  |  104  |  10  ----------------------------<  123<H>  3.8   |  21<L>  |  0.93    Ca    9.3      14 Sep 2021 07:52  Phos  2.2     09-13  Mg     1.50     09-14    TPro  6.7  /  Alb  3.8  /  TBili  0.4  /  DBili  x   /  AST  19  /  ALT  15  /  AlkPhos  69  09-14              RADIOLOGY, EKG & ADDITIONAL TESTS: Reviewed.  Patient:  NELSON MITCHELL  [id number]    Mr. 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     Progress Note    Interval events: No acute events overnight. Patient continues to be in pain with episodes of spasms and weakness without improvement. He tried walking to the bathroom twice yesterday with assistance from the nurse - while he was able to do so, he found it very difficult. No headache, chest pain, difficulty breathing, vision changes.     Administered:  gabapentin: 300 milliGRAM(s) Oral (09-14 @ 06:11)  baclofen: 15 milliGRAM(s) Oral (09-14 @ 06:11)  (ADM OVERRIDE): 2 Each &lt;See Task&gt; (09-14 @ 05:17)  melatonin: 3 milliGRAM(s) Oral (09-13 @ 21:41)  QUEtiapine: 200 milliGRAM(s) Oral (09-13 @ 21:41)  gabapentin: 300 milliGRAM(s) Oral (09-13 @ 21:40)  baclofen: 15 milliGRAM(s) Oral (09-13 @ 21:40)        OBJECTIVE:    09-13 @ 07:01  -  09-14 @ 07:00  --------------------------------------------------------  IN: 950 mL / OUT: 1900 mL / NET: -950 mL      CAPILLARY BLOOD GLUCOSE            VITALS:  T(F): 98.1 (09-14-21 @ 06:10), Max: 98.1 (09-14-21 @ 06:10)  HR: 72 (09-14-21 @ 06:10) (62 - 72)  BP: 108/62 (09-14-21 @ 06:10) (108/62 - 136/79)  BP(mean): --  ABP: --  ABP(mean): --  RR: 18 (09-14-21 @ 06:10) (18 - 18)  SpO2: 97% (09-14-21 @ 06:10) (95% - 97%)    PHYSICAL EXAM:  GENERAL: NAD  EYES: EOMI, PERRLA, conjunctiva and sclera clear  NECK:  No JVD  CHEST/LUNG: CTABL ; No wheeze  HEART: RRR; No murmurs  ABDOMEN: Soft, Nontender, Nondistended; Bowel sounds present  : No Brizuela  EXTREMITIES:  2+ Peripheral Pulses, No edema  PSYCH: AAOx3  NEUROLOGY: b/l LE weakness 1-2/5, more prominent on left. Pain on palpation of left calf. b/l Ptosis. CN II-XII intact. +hyperreflexia. Upper extremities normal   SKIN: No rashes or lesions. No sacral ulcer    HOSPITAL MEDICATIONS:  Standing Meds:  baclofen 15 milliGRAM(s) Oral three times a day  bictegravir 50 mG/emtricitabine 200 mG/tenofovir alafenamide 25 mG (BIKTARVY) 1 Tablet(s) Oral daily  cholecalciferol 1000 Unit(s) Oral daily  enoxaparin Injectable 40 milliGRAM(s) SubCutaneous daily  gabapentin 300 milliGRAM(s) Oral three times a day  lidocaine   4% Patch 1 Patch Transdermal daily  QUEtiapine 200 milliGRAM(s) Oral at bedtime  sodium chloride 0.9%. 1000 milliLiter(s) IV Continuous <Continuous>      PRN Meds:  LORazepam   Injectable 1 milliGRAM(s) IV Push once PRN  melatonin 3 milliGRAM(s) Oral at bedtime PRN      .  LABS:                         13.4   3.45  )-----------( 244      ( 14 Sep 2021 07:52 )             41.0     09-14    139  |  104  |  10  ----------------------------<  123<H>  3.8   |  21<L>  |  0.93    Ca    9.3      14 Sep 2021 07:52  Phos  2.2     09-13  Mg     1.50     09-14    TPro  6.7  /  Alb  3.8  /  TBili  0.4  /  DBili  x   /  AST  19  /  ALT  15  /  AlkPhos  69  09-14              RADIOLOGY, EKG & ADDITIONAL TESTS: Reviewed.     CD4 count: 322  CD8 count: 1060  CD4/CD8 Ratio: 0.3        Homocysteine 7.7

## 2021-09-14 NOTE — PROGRESS NOTE ADULT - PROBLEM SELECTOR PLAN 1
- subjective b/l LE weakness without objective findings associated w/lower back pain over the past several months; multiple previous admissions for similar presentation. labs without electrolyte derangement; no signs of infection on labs or subjective; previous MR C/T/L spine 5/2021 and 6/2021 without acute findings; no trauma, falls, or worsening in LE weakness since these previous imaging studies  -PMR consult called, will evaluate tomorrow  - f/u MG w/u as pt w/ptosis; f/u TSH, MUSK, acHR-antibodies. Further serologic w/u per neuro: SHAI, lipid panel, a1c  -Collect serum CK, homocysteine, methylmalonic acid, SPEP - subjective b/l LE weakness without objective findings associated w/lower back pain over the past several months; multiple previous admissions for similar presentation. labs without electrolyte derangement; no signs of infection on labs or subjective; previous MR C/T/L spine 5/2021 and 6/2021 without acute findings; no trauma, falls, or worsening in LE weakness since these previous imaging studies  -PMR consult cancelled, no need for further evaluation  -Awaiting additional serum labs such as phosphorus, MMA - subjective b/l LE weakness without objective findings associated w/lower back pain over the past several months; multiple previous admissions for similar presentation. labs without electrolyte derangement; no signs of infection on labs or subjective; previous MR C/T/L spine 5/2021 and 6/2021 without acute findings; no trauma, falls, or worsening in LE weakness since these previous imaging studies  -PMR consult f/u  -Awaiting additional serum labs such as phosphorus, MMA, HIV VIral Load

## 2021-09-14 NOTE — PROGRESS NOTE ADULT - PROBLEM SELECTOR PLAN 2
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 Easton Hunter on 9/29 at 3:15 at [address] [place] [address]      - recs to continue Biktarvy (per phone conversation)

## 2021-09-15 PROCEDURE — 71045 X-RAY EXAM CHEST 1 VIEW: CPT | Mod: 26

## 2021-09-15 PROCEDURE — 99232 SBSQ HOSP IP/OBS MODERATE 35: CPT | Mod: GC

## 2021-09-15 RX ADMIN — GABAPENTIN 300 MILLIGRAM(S): 400 CAPSULE ORAL at 05:43

## 2021-09-15 RX ADMIN — QUETIAPINE FUMARATE 200 MILLIGRAM(S): 200 TABLET, FILM COATED ORAL at 21:29

## 2021-09-15 RX ADMIN — Medication 1000 UNIT(S): at 13:15

## 2021-09-15 RX ADMIN — Medication 15 MILLIGRAM(S): at 05:43

## 2021-09-15 RX ADMIN — Medication 3 MILLIGRAM(S): at 21:40

## 2021-09-15 RX ADMIN — GABAPENTIN 300 MILLIGRAM(S): 400 CAPSULE ORAL at 13:15

## 2021-09-15 RX ADMIN — LIDOCAINE 1 PATCH: 4 CREAM TOPICAL at 13:14

## 2021-09-15 RX ADMIN — GABAPENTIN 300 MILLIGRAM(S): 400 CAPSULE ORAL at 21:29

## 2021-09-15 RX ADMIN — BICTEGRAVIR SODIUM, EMTRICITABINE, AND TENOFOVIR ALAFENAMIDE FUMARATE 1 TABLET(S): 30; 120; 15 TABLET ORAL at 13:14

## 2021-09-15 RX ADMIN — LIDOCAINE 1 PATCH: 4 CREAM TOPICAL at 00:06

## 2021-09-15 RX ADMIN — LIDOCAINE 1 PATCH: 4 CREAM TOPICAL at 19:17

## 2021-09-15 NOTE — PROGRESS NOTE ADULT - PROBLEM SELECTOR PLAN 3
on baclofen and gabapentin at home  - continue home regimen on baclofen and gabapentin at home  -D/c baclofen, continue home regimen of gabapentin

## 2021-09-15 NOTE — PROGRESS NOTE ADULT - ASSESSMENT
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 MG. Awaiting transfer to rehab for continuation of care. 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 MG. Awaiting transfer to [facility] for continuation of care.

## 2021-09-15 NOTE — PROGRESS NOTE ADULT - PROBLEM SELECTOR PLAN 2
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 Easton Hunter on 9/29 at 3:15 at [address] [place] [address]      - recs to continue Biktarvy (per phone conversation).      - F/U HIV viral load

## 2021-09-15 NOTE — PROGRESS NOTE ADULT - SUBJECTIVE AND OBJECTIVE BOX
Patient:  NELSON MITCHELL  [id number]    Progress Note    Interval events: No acute events.  Pertinent ROS (if any):      Administered:  gabapentin: 300 milliGRAM(s) Oral (09-15 @ 05:43)  baclofen: 15 milliGRAM(s) Oral (09-15 @ 05:43)  gabapentin: 300 milliGRAM(s) Oral (09-14 @ 21:58)  melatonin: 3 milliGRAM(s) Oral (09-14 @ 21:56)  baclofen: 15 milliGRAM(s) Oral (09-14 @ 21:55)  QUEtiapine: 200 milliGRAM(s) Oral (09-14 @ 21:54)  (ADM OVERRIDE): 5 Each &lt;See Task&gt; (09-14 @ 20:29)  (ADM OVERRIDE): 5 Each &lt;See Task&gt; (09-14 @ 20:28)        OBJECTIVE:    09-14 @ 07:01  -  09-15 @ 07:00  --------------------------------------------------------  IN: 0 mL / OUT: 750 mL / NET: -750 mL      CAPILLARY BLOOD GLUCOSE            VITALS:  T(F): 98.2 (09-15-21 @ 05:40), Max: 98.2 (09-15-21 @ 05:40)  HR: 60 (09-15-21 @ 05:40) (60 - 73)  BP: 120/60 (09-15-21 @ 05:40) (120/60 - 133/70)  BP(mean): --  ABP: --  ABP(mean): --  RR: 17 (09-15-21 @ 05:40) (17 - 18)  SpO2: 100% (09-15-21 @ 05:40) (100% - 100%)    PHYSICAL EXAM:  GENERAL: NAD  EYES: EOMI, PERRLA, conjunctiva and sclera clear  NECK:  No JVD  CHEST/LUNG: CTABL ; No wheeze  HEART: RRR; No murmurs  ABDOMEN: Soft, Nontender, Nondistended; Bowel sounds present  : No Brizuela  EXTREMITIES:  2+ Peripheral Pulses, No edema  PSYCH: AAOx3  NEUROLOGY: b/l LE weakness 1-2/5, more prominent on left. Pain on palpation of left calf. b/l Ptosis. CN II-XII intact. +hyperreflexia. Upper extremities normal   SKIN: No rashes or lesions. No sacral ulcers.    HOSPITAL MEDICATIONS:  Standing Meds:  baclofen 15 milliGRAM(s) Oral three times a day  bictegravir 50 mG/emtricitabine 200 mG/tenofovir alafenamide 25 mG (BIKTARVY) 1 Tablet(s) Oral daily  cholecalciferol 1000 Unit(s) Oral daily  enoxaparin Injectable 40 milliGRAM(s) SubCutaneous daily  gabapentin 300 milliGRAM(s) Oral three times a day  lidocaine   4% Patch 1 Patch Transdermal daily  QUEtiapine 200 milliGRAM(s) Oral at bedtime  sodium chloride 0.9%. 1000 milliLiter(s) IV Continuous <Continuous>      PRN Meds:  LORazepam   Injectable 1 milliGRAM(s) IV Push once PRN  melatonin 3 milliGRAM(s) Oral at bedtime PRN      LABS:  CBC 09-14-21 @ 07:52                        13.4   3.45  )-----------( 244                   41.0       Hgb trend: 13.4 <-- , 13.9 <--   WBC trend: 3.45 <-- , 4.28 <--       CMP 09-14-21 @ 07:52    139  |  104  |  10  ----------------------------<  123<H>  3.8   |  21<L>  |  0.93    Ca    9.3      09-14-21 @ 07:52  Phos  4.0     09-14  Mg     1.50     09-14    TPro  6.7  /  Alb  3.8  /  TBili  0.4  /  DBili  x   /  AST  19  /  ALT  15  /  AlkPhos  69  09-14      Serum Cr trend: 0.93 <-- , 0.92 <--         ABG Trend:     VBG Trend:       MICROBIOLOGY:       RADIOLOGY:  [ ] Reviewed and interpreted by me    EKG:   Patient:  NELSON MITCHELL  [id number]    Mr. 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     Progress Note    Interval events: No acute events overnight. Patient reports pain is a little well controlled on Tylenol but weakness persists. Was able to walk retirement down the hallway with assistance of PT, reported pain and difficulty but was able to complete. No headache, chest pain, difficulty breathing, vision changes.       Administered:  gabapentin: 300 milliGRAM(s) Oral (09-15 @ 05:43)  baclofen: 15 milliGRAM(s) Oral (09-15 @ 05:43)  gabapentin: 300 milliGRAM(s) Oral (09-14 @ 21:58)  melatonin: 3 milliGRAM(s) Oral (09-14 @ 21:56)  baclofen: 15 milliGRAM(s) Oral (09-14 @ 21:55)  QUEtiapine: 200 milliGRAM(s) Oral (09-14 @ 21:54)  (ADM OVERRIDE): 5 Each &lt;See Task&gt; (09-14 @ 20:29)  (ADM OVERRIDE): 5 Each &lt;See Task&gt; (09-14 @ 20:28)        OBJECTIVE:    09-14 @ 07:01  -  09-15 @ 07:00  --------------------------------------------------------  IN: 0 mL / OUT: 750 mL / NET: -750 mL      CAPILLARY BLOOD GLUCOSE            VITALS:  T(F): 98.2 (09-15-21 @ 05:40), Max: 98.2 (09-15-21 @ 05:40)  HR: 60 (09-15-21 @ 05:40) (60 - 73)  BP: 120/60 (09-15-21 @ 05:40) (120/60 - 133/70)  BP(mean): --  ABP: --  ABP(mean): --  RR: 17 (09-15-21 @ 05:40) (17 - 18)  SpO2: 100% (09-15-21 @ 05:40) (100% - 100%)    PHYSICAL EXAM:  GENERAL: NAD  EYES: EOMI, PERRLA, conjunctiva and sclera clear  NECK:  No JVD  CHEST/LUNG: CTABL ; No wheeze  HEART: RRR; No murmurs  ABDOMEN: Soft, Nontender, Nondistended; Bowel sounds present  : No Brizuela  EXTREMITIES:  2+ Peripheral Pulses, No edema  PSYCH: AAOx3  NEUROLOGY: b/l LE weakness 1-2/5, more prominent on left. Pain on palpation of left calf. b/l Ptosis. CN II-XII intact. +hyperreflexia. Upper extremities normal   SKIN: No rashes or lesions. No sacral ulcers.    HOSPITAL MEDICATIONS:  Standing Meds:  baclofen 15 milliGRAM(s) Oral three times a day  bictegravir 50 mG/emtricitabine 200 mG/tenofovir alafenamide 25 mG (BIKTARVY) 1 Tablet(s) Oral daily  cholecalciferol 1000 Unit(s) Oral daily  enoxaparin Injectable 40 milliGRAM(s) SubCutaneous daily  gabapentin 300 milliGRAM(s) Oral three times a day  lidocaine   4% Patch 1 Patch Transdermal daily  QUEtiapine 200 milliGRAM(s) Oral at bedtime  sodium chloride 0.9%. 1000 milliLiter(s) IV Continuous <Continuous>      PRN Meds:  LORazepam   Injectable 1 milliGRAM(s) IV Push once PRN  melatonin 3 milliGRAM(s) Oral at bedtime PRN      LABS:  CBC 09-14-21 @ 07:52                        13.4   3.45  )-----------( 244                   41.0       Hgb trend: 13.4 <-- , 13.9 <--   WBC trend: 3.45 <-- , 4.28 <--       CMP 09-14-21 @ 07:52    139  |  104  |  10  ----------------------------<  123<H>  3.8   |  21<L>  |  0.93    Ca    9.3      09-14-21 @ 07:52  Phos  4.0     09-14  Mg     1.50     09-14    TPro  6.7  /  Alb  3.8  /  TBili  0.4  /  DBili  x   /  AST  19  /  ALT  15  /  AlkPhos  69  09-14      Serum Cr trend: 0.93 <-- , 0.92 <--         ABG Trend:     VBG Trend:     COVID negative    MICROBIOLOGY:       RADIOLOGY:  [ ] Reviewed and interpreted by me    EKG:   Patient:  NELSON MITCHELL  [id number]    Mr. 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     Progress Note    Interval events: No acute events overnight. Patient reports pain is a little well controlled on Tylenol but weakness persists. Was able to walk [facility] down the hallway with assistance of PT, reported pain and difficulty but was able to complete. No headache, chest pain, difficulty breathing, vision changes. Likely d/c tomorrow.       Administered:  gabapentin: 300 milliGRAM(s) Oral (09-15 @ 05:43)  baclofen: 15 milliGRAM(s) Oral (09-15 @ 05:43)  gabapentin: 300 milliGRAM(s) Oral (09-14 @ 21:58)  melatonin: 3 milliGRAM(s) Oral (09-14 @ 21:56)  baclofen: 15 milliGRAM(s) Oral (09-14 @ 21:55)  QUEtiapine: 200 milliGRAM(s) Oral (09-14 @ 21:54)  (ADM OVERRIDE): 5 Each &lt;See Task&gt; (09-14 @ 20:29)  (ADM OVERRIDE): 5 Each &lt;See Task&gt; (09-14 @ 20:28)        OBJECTIVE:    09-14 @ 07:01  -  09-15 @ 07:00  --------------------------------------------------------  IN: 0 mL / OUT: 750 mL / NET: -750 mL      CAPILLARY BLOOD GLUCOSE            VITALS:  T(F): 98.2 (09-15-21 @ 05:40), Max: 98.2 (09-15-21 @ 05:40)  HR: 60 (09-15-21 @ 05:40) (60 - 73)  BP: 120/60 (09-15-21 @ 05:40) (120/60 - 133/70)  BP(mean): --  ABP: --  ABP(mean): --  RR: 17 (09-15-21 @ 05:40) (17 - 18)  SpO2: 100% (09-15-21 @ 05:40) (100% - 100%)    PHYSICAL EXAM:  GENERAL: NAD  EYES: EOMI, PERRLA, conjunctiva and sclera clear  NECK:  No JVD  CHEST/LUNG: CTABL ; No wheeze  HEART: RRR; No murmurs  ABDOMEN: Soft, Nontender, Nondistended; Bowel sounds present  : No Brizuela  EXTREMITIES:  2+ Peripheral Pulses, No edema  PSYCH: AAOx3  NEUROLOGY: b/l LE weakness 1-2/5, more prominent on left. Pain on palpation of left calf. b/l Ptosis. CN II-XII intact. +hyperreflexia. Upper extremities normal   SKIN: No rashes or lesions. No sacral ulcers.    HOSPITAL MEDICATIONS:  Standing Meds:  baclofen 15 milliGRAM(s) Oral three times a day  bictegravir 50 mG/emtricitabine 200 mG/tenofovir alafenamide 25 mG (BIKTARVY) 1 Tablet(s) Oral daily  cholecalciferol 1000 Unit(s) Oral daily  enoxaparin Injectable 40 milliGRAM(s) SubCutaneous daily  gabapentin 300 milliGRAM(s) Oral three times a day  lidocaine   4% Patch 1 Patch Transdermal daily  QUEtiapine 200 milliGRAM(s) Oral at bedtime  sodium chloride 0.9%. 1000 milliLiter(s) IV Continuous <Continuous>      PRN Meds:  LORazepam   Injectable 1 milliGRAM(s) IV Push once PRN  melatonin 3 milliGRAM(s) Oral at bedtime PRN      LABS:  CBC 09-14-21 @ 07:52                        13.4   3.45  )-----------( 244                   41.0       Hgb trend: 13.4 <-- , 13.9 <--   WBC trend: 3.45 <-- , 4.28 <--       CMP 09-14-21 @ 07:52    139  |  104  |  10  ----------------------------<  123<H>  3.8   |  21<L>  |  0.93    Ca    9.3      09-14-21 @ 07:52  Phos  4.0     09-14  Mg     1.50     09-14    TPro  6.7  /  Alb  3.8  /  TBili  0.4  /  DBili  x   /  AST  19  /  ALT  15  /  AlkPhos  69  09-14      Serum Cr trend: 0.93 <-- , 0.92 <--         ABG Trend:     VBG Trend:     COVID negative    MICROBIOLOGY:       RADIOLOGY:  [ ] Reviewed and interpreted by me    EKG:

## 2021-09-15 NOTE — PROVIDER CONTACT NOTE (OTHER) - ACTION/TREATMENT ORDERED:
Baclofen was discontinued pending discharge tomorrow, reassess patient after giving gabapentin, if it doesn't work we can possibly order baclofen.

## 2021-09-15 NOTE — PROGRESS NOTE ADULT - PROBLEM SELECTOR PLAN 1
subjective b/l LE weakness without objective findings associated w/lower back pain over the past several months; multiple previous admissions for similar presentation. labs without electrolyte derangement; Endorses 2 week onset of shooting pain down legs  - no signs of infection on labs or subjective  - previous MR C/T/L spine 5/2021 and 6/2021 without acute findings; no trauma, falls, or worsening in LE weakness since these previous imaging studies  -PMR consult, recommend [facility] when medically cleared (will not qualify for acute rehab due to homelessness)  - MG workup: awaiting additional serum labs subjective b/l LE weakness without objective findings associated w/lower back pain over the past several months; multiple previous admissions for similar presentation. labs without electrolyte derangement; Endorses 2 week onset of shooting pain down legs  - no signs of infection on labs or subjective  - previous MR C/T/L spine 5/2021 and 6/2021 without acute findings; no trauma, falls, or worsening in LE weakness since these previous imaging studies  -PMR consult, recommend [facility] when medically cleared (will not qualify for acute rehab due to homelessness)  - MG workup: awaiting additional serum labs  - CXR: wnl  - COVID negative

## 2021-09-15 NOTE — PROGRESS NOTE ADULT - PROBLEM SELECTOR PLAN 4
DVT ppx: lovenox  Diet: regular  Dispo- PT evaluation, social work, likely d/c to [facility] DVT ppx: lovenox  Diet: regular  Dispo- PT evaluation, social work, likely d/c to [facility]  -Chest X-ray for rehab discharge

## 2021-09-15 NOTE — PROVIDER CONTACT NOTE (OTHER) - SITUATION
Patient requesting baclofen however order was discontinued  Patient's IV isn't working, does patient need a new IV seeing that he might be discharged tomorrow and isn't currently receiving anything IV

## 2021-09-16 LAB
% BETA: SIGNIFICANT CHANGE UP %
ALBUMIN SERPL ELPH-MCNC: SIGNIFICANT CHANGE UP G/DL (ref 3.3–4.4)
ALBUMIN/GLOB SERPL ELPH: SIGNIFICANT CHANGE UP RATIO
ALPHA1 GLOB SERPL ELPH-MCNC: SIGNIFICANT CHANGE UP G/DL (ref 0.1–0.3)
ALPHA2 GLOB SERPL ELPH-MCNC: SIGNIFICANT CHANGE UP G/DL (ref 0.6–1)
B-GLOBULIN SERPL ELPH-MCNC: SIGNIFICANT CHANGE UP G/DL (ref 0.6–1.1)
GAMMA GLOBULIN: SIGNIFICANT CHANGE UP G/DL (ref 0.7–1.7)
INTERPRETATION SERPL IFE-IMP: SIGNIFICANT CHANGE UP
METHYLMALONATE SERPL-SCNC: 147 NMOL/L — SIGNIFICANT CHANGE UP (ref 0–378)

## 2021-09-16 PROCEDURE — 99232 SBSQ HOSP IP/OBS MODERATE 35: CPT

## 2021-09-16 RX ORDER — BACLOFEN 100 %
15 POWDER (GRAM) MISCELLANEOUS THREE TIMES A DAY
Refills: 0 | Status: DISCONTINUED | OUTPATIENT
Start: 2021-09-16 | End: 2021-09-17

## 2021-09-16 RX ORDER — BACLOFEN 100 %
3 POWDER (GRAM) MISCELLANEOUS
Qty: 0 | Refills: 0 | DISCHARGE
Start: 2021-09-16

## 2021-09-16 RX ADMIN — LIDOCAINE 1 PATCH: 4 CREAM TOPICAL at 20:22

## 2021-09-16 RX ADMIN — Medication 15 MILLIGRAM(S): at 22:32

## 2021-09-16 RX ADMIN — LIDOCAINE 1 PATCH: 4 CREAM TOPICAL at 01:15

## 2021-09-16 RX ADMIN — Medication 15 MILLIGRAM(S): at 17:57

## 2021-09-16 RX ADMIN — Medication 1000 UNIT(S): at 13:10

## 2021-09-16 RX ADMIN — Medication 3 MILLIGRAM(S): at 22:32

## 2021-09-16 RX ADMIN — BICTEGRAVIR SODIUM, EMTRICITABINE, AND TENOFOVIR ALAFENAMIDE FUMARATE 1 TABLET(S): 30; 120; 15 TABLET ORAL at 17:57

## 2021-09-16 RX ADMIN — GABAPENTIN 300 MILLIGRAM(S): 400 CAPSULE ORAL at 13:09

## 2021-09-16 RX ADMIN — GABAPENTIN 300 MILLIGRAM(S): 400 CAPSULE ORAL at 06:50

## 2021-09-16 RX ADMIN — GABAPENTIN 300 MILLIGRAM(S): 400 CAPSULE ORAL at 22:31

## 2021-09-16 RX ADMIN — LIDOCAINE 1 PATCH: 4 CREAM TOPICAL at 13:08

## 2021-09-16 RX ADMIN — QUETIAPINE FUMARATE 200 MILLIGRAM(S): 200 TABLET, FILM COATED ORAL at 22:31

## 2021-09-16 NOTE — DIETITIAN INITIAL EVALUATION ADULT. - PROBLEM SELECTOR PLAN 1
subjective b/l LE weakness without objective findings associated w/lower back pain over the past several months; labs without electrolyte derangement; no signs of infection on labs or subjective; previous MR C/T/L spine 5/2021 and 6/2021 without acute findings; no trauma, falls, or worsening in LE weakness since these previous imaging studies  - neuro following; recommending MR head, C/T spine inpatient or outpatient; pending d/w neuro attending  - f/u MG w/u as pt previously w/ptosis; f/u TSH

## 2021-09-16 NOTE — PROGRESS NOTE ADULT - PROBLEM SELECTOR PLAN 1
subjective b/l LE weakness without objective findings associated w/lower back pain over the past several months; multiple previous admissions for similar presentation. labs without electrolyte derangement; Endorses 2 week onset of shooting pain down legs  - no signs of infection on labs or subjective  - previous MR C/T/L spine 5/2021 and 6/2021 without acute findings; no trauma, falls, or worsening in LE weakness since these previous imaging studies  -PMR consult, recommend [facility] when medically cleared (will not qualify for acute rehab due to homelessness)  - MG workup: awaiting additional serum labs  - CXR: wnl  - COVID negative

## 2021-09-16 NOTE — PROGRESS NOTE ADULT - SUBJECTIVE AND OBJECTIVE BOX
Patient:  NELSON MITCHELL  [id number]    Progress Note    Interval events: No acute events.  Pertinent ROS (if any):      Administered:  gabapentin: 300 milliGRAM(s) Oral (09-16 @ 06:50)  melatonin: 3 milliGRAM(s) Oral (09-15 @ 21:40)  gabapentin: 300 milliGRAM(s) Oral (09-15 @ 21:29)  QUEtiapine: 200 milliGRAM(s) Oral (09-15 @ 21:29)        OBJECTIVE:    09-15 @ 07:01  -  09-16 @ 07:00  --------------------------------------------------------  IN: 400 mL / OUT: 1900 mL / NET: -1500 mL      CAPILLARY BLOOD GLUCOSE            VITALS:  T(F): 98.2 (09-16-21 @ 06:45), Max: 98.2 (09-16-21 @ 06:45)  HR: 69 (09-16-21 @ 06:45) (69 - 79)  BP: 128/69 (09-16-21 @ 06:45) (128/69 - 136/78)  BP(mean): --  ABP: --  ABP(mean): --  RR: 18 (09-16-21 @ 06:45) (16 - 18)  SpO2: 100% (09-16-21 @ 06:45) (100% - 100%)    PHYSICAL EXAM:  GENERAL: NAD, lying in bed comfortably  HEAD:  Atraumatic, Normocephalic  EYES: EOMI, PERRLA, conjunctiva and sclera clear  ENT: Moist mucous membranes  NECK: Supple, No JVD  CHEST/LUNG: Clear to auscultation bilaterally; No rales, rhonchi, wheezing, or rubs. Unlabored respirations  HEART: Regular rate and rhythm; No murmurs, rubs, or gallops  ABDOMEN: Bowel sounds present; Soft, Nontender, Nondistended. No hepatomegaly  EXTREMITIES:  2+ Peripheral Pulses, brisk capillary refill. No clubbing, cyanosis, or edema  NERVOUS SYSTEM:  Alert & Oriented X3, speech clear. No deficits   MSK: FROM all 4 extremities, full and equal strength  SKIN: No rashes or lesions    HOSPITAL MEDICATIONS:  Standing Meds:  bictegravir 50 mG/emtricitabine 200 mG/tenofovir alafenamide 25 mG (BIKTARVY) 1 Tablet(s) Oral daily  cholecalciferol 1000 Unit(s) Oral daily  enoxaparin Injectable 40 milliGRAM(s) SubCutaneous daily  gabapentin 300 milliGRAM(s) Oral three times a day  lidocaine   4% Patch 1 Patch Transdermal daily  QUEtiapine 200 milliGRAM(s) Oral at bedtime  sodium chloride 0.9%. 1000 milliLiter(s) IV Continuous <Continuous>      PRN Meds:  LORazepam   Injectable 1 milliGRAM(s) IV Push once PRN  melatonin 3 milliGRAM(s) Oral at bedtime PRN      LABS:  CBC 09-14-21 @ 07:52                        13.4   3.45  )-----------( 244                   41.0       Hgb trend: 13.4 <-- , 13.9 <--   WBC trend: 3.45 <-- , 4.28 <--       CMP 09-14-21 @ 07:52    139  |  104  |  10  ----------------------------<  123<H>  3.8   |  21<L>  |  0.93    Phos  4.0     09-14  Mg     1.50     09-14    TPro  6.7  /  Alb  3.8  /  TBili  0.4  /  DBili  x   /  AST  19  /  ALT  15  /  AlkPhos  69  09-14      Serum Cr trend: 0.93 <-- , 0.92 <--         ABG Trend:     VBG Trend:       MICROBIOLOGY:       RADIOLOGY:  [ ] Reviewed and interpreted by me    EKG:   Patient:  NELSON MITCHELL  [id number]    Mr. 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     Progress Note    Interval events: No acute events overnight. Likely d/c today.      Administered:  gabapentin: 300 milliGRAM(s) Oral (09-16 @ 06:50)  melatonin: 3 milliGRAM(s) Oral (09-15 @ 21:40)  gabapentin: 300 milliGRAM(s) Oral (09-15 @ 21:29)  QUEtiapine: 200 milliGRAM(s) Oral (09-15 @ 21:29)        OBJECTIVE:    09-15 @ 07:01  -  09-16 @ 07:00  --------------------------------------------------------  IN: 400 mL / OUT: 1900 mL / NET: -1500 mL      CAPILLARY BLOOD GLUCOSE            VITALS:  T(F): 98.2 (09-16-21 @ 06:45), Max: 98.2 (09-16-21 @ 06:45)  HR: 69 (09-16-21 @ 06:45) (69 - 79)  BP: 128/69 (09-16-21 @ 06:45) (128/69 - 136/78)  BP(mean): --  ABP: --  ABP(mean): --  RR: 18 (09-16-21 @ 06:45) (16 - 18)  SpO2: 100% (09-16-21 @ 06:45) (100% - 100%)    PHYSICAL EXAM:  GENERAL: NAD  EYES: EOMI, PERRLA, conjunctiva and sclera clear  NECK:  No JVD  CHEST/LUNG: CTABL ; No wheeze  HEART: RRR; No murmurs  ABDOMEN: Soft, Nontender, Nondistended; Bowel sounds present  : No Brizuela  EXTREMITIES:  2+ Peripheral Pulses, No edema  PSYCH: AAOx3  NEUROLOGY: b/l LE weakness 1-2/5, more prominent on left. Pain on palpation of left calf. b/l Ptosis. CN II-XII intact. +hyperreflexia. Upper extremities normal   SKIN: No rashes or lesions. No sacral ulcers.    HOSPITAL MEDICATIONS:  Standing Meds:  bictegravir 50 mG/emtricitabine 200 mG/tenofovir alafenamide 25 mG (BIKTARVY) 1 Tablet(s) Oral daily  cholecalciferol 1000 Unit(s) Oral daily  enoxaparin Injectable 40 milliGRAM(s) SubCutaneous daily  gabapentin 300 milliGRAM(s) Oral three times a day  lidocaine   4% Patch 1 Patch Transdermal daily  QUEtiapine 200 milliGRAM(s) Oral at bedtime  sodium chloride 0.9%. 1000 milliLiter(s) IV Continuous <Continuous>      PRN Meds:  LORazepam   Injectable 1 milliGRAM(s) IV Push once PRN  melatonin 3 milliGRAM(s) Oral at bedtime PRN      LABS:  CBC 09-14-21 @ 07:52                        13.4   3.45  )-----------( 244                   41.0       Hgb trend: 13.4 <-- , 13.9 <--   WBC trend: 3.45 <-- , 4.28 <--       CMP 09-14-21 @ 07:52    139  |  104  |  10  ----------------------------<  123<H>  3.8   |  21<L>  |  0.93    Phos  4.0     09-14  Mg     1.50     09-14    TPro  6.7  /  Alb  3.8  /  TBili  0.4  /  DBili  x   /  AST  19  /  ALT  15  /  AlkPhos  69  09-14      Serum Cr trend: 0.93 <-- , 0.92 <--         ABG Trend:     VBG Trend:       MICROBIOLOGY:       RADIOLOGY:  [ ] Reviewed and interpreted by me    EKG:   Patient:  NELSON MITCHLEL  [id number]    Mr. 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     Progress Note    Interval events: No acute events overnight. Patient reports is feel less pain than before. He was able to walk unassisted to the bathroom, though still with weakness and difficulty. He was curious as to why he did not receive baclofen last night, a discussion was had regarding the d/c because he did not feel that it was helping. He is still interested in continuing the baclofen. Likely d/c today.      Administered:  gabapentin: 300 milliGRAM(s) Oral (09-16 @ 06:50)  melatonin: 3 milliGRAM(s) Oral (09-15 @ 21:40)  gabapentin: 300 milliGRAM(s) Oral (09-15 @ 21:29)  QUEtiapine: 200 milliGRAM(s) Oral (09-15 @ 21:29)        OBJECTIVE:    09-15 @ 07:01  -  09-16 @ 07:00  --------------------------------------------------------  IN: 400 mL / OUT: 1900 mL / NET: -1500 mL      CAPILLARY BLOOD GLUCOSE            VITALS:  T(F): 98.2 (09-16-21 @ 06:45), Max: 98.2 (09-16-21 @ 06:45)  HR: 69 (09-16-21 @ 06:45) (69 - 79)  BP: 128/69 (09-16-21 @ 06:45) (128/69 - 136/78)  BP(mean): --  ABP: --  ABP(mean): --  RR: 18 (09-16-21 @ 06:45) (16 - 18)  SpO2: 100% (09-16-21 @ 06:45) (100% - 100%)    PHYSICAL EXAM:  GENERAL: NAD  EYES: EOMI, PERRLA, conjunctiva and sclera clear  NECK:  No JVD  CHEST/LUNG: CTABL ; No wheeze  HEART: RRR; No murmurs  ABDOMEN: Soft, Nontender, Nondistended; Bowel sounds present  : No Brizuela  EXTREMITIES:  2+ Peripheral Pulses, No edema  PSYCH: AAOx3  NEUROLOGY: b/l LE weakness 1-2/5, more prominent on left. Pain on palpation of left calf. b/l Ptosis. CN II-XII intact. +hyperreflexia. Upper extremities normal   SKIN: No rashes or lesions. No sacral ulcers.    HOSPITAL MEDICATIONS:  Standing Meds:  bictegravir 50 mG/emtricitabine 200 mG/tenofovir alafenamide 25 mG (BIKTARVY) 1 Tablet(s) Oral daily  cholecalciferol 1000 Unit(s) Oral daily  enoxaparin Injectable 40 milliGRAM(s) SubCutaneous daily  gabapentin 300 milliGRAM(s) Oral three times a day  lidocaine   4% Patch 1 Patch Transdermal daily  QUEtiapine 200 milliGRAM(s) Oral at bedtime  sodium chloride 0.9%. 1000 milliLiter(s) IV Continuous <Continuous>      PRN Meds:  LORazepam   Injectable 1 milliGRAM(s) IV Push once PRN  melatonin 3 milliGRAM(s) Oral at bedtime PRN      LABS:  CBC 09-14-21 @ 07:52                        13.4   3.45  )-----------( 244                   41.0       Hgb trend: 13.4 <-- , 13.9 <--   WBC trend: 3.45 <-- , 4.28 <--       CMP 09-14-21 @ 07:52    139  |  104  |  10  ----------------------------<  123<H>  3.8   |  21<L>  |  0.93    Phos  4.0     09-14  Mg     1.50     09-14    TPro  6.7  /  Alb  3.8  /  TBili  0.4  /  DBili  x   /  AST  19  /  ALT  15  /  AlkPhos  69  09-14      Serum Cr trend: 0.93 <-- , 0.92 <--         ABG Trend:     VBG Trend:       MICROBIOLOGY:       RADIOLOGY:  [ ] Reviewed and interpreted by me    EKG:
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
## 2021-09-16 NOTE — DIETITIAN INITIAL EVALUATION ADULT. - OTHER INFO
Patient reports good appetite with 100% PO intake in-house. Denies GI distress (nausea/vomiting/diarrhea/constipation), last BM today per patient. States no difficulty chewing/swallowing. Endorses a usual body weight of ~91kg and is unsure of any recent weight changes. Dosing weight of 87.9kg differs from stated weight possibly due to scale discrepancy. Patient with no nutrition related concerns at this time.

## 2021-09-16 NOTE — DIETITIAN INITIAL EVALUATION ADULT. - CHIEF COMPLAINT
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 MG. Awaiting transfer to [facility] for continuation of care.

## 2021-09-16 NOTE — DIETITIAN INITIAL EVALUATION ADULT. - ORAL INTAKE PTA/DIET HISTORY
Patient reports good PO intake PTA. Denies use of micronutrient/nutrition supplements at home. Confirms NKFA.

## 2021-09-16 NOTE — PROGRESS NOTE ADULT - PROBLEM SELECTOR PLAN 2
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 Easton Hunter on 9/29 at 3:15 at [address] [place] [address]      - recs to continue Biktarvy (per phone conversation).      - F/U HIV viral load

## 2021-09-16 NOTE — DIETITIAN INITIAL EVALUATION ADULT. - PERTINENT MEDS FT
MEDICATIONS  (STANDING):  baclofen 15 milliGRAM(s) Oral three times a day  bictegravir 50 mG/emtricitabine 200 mG/tenofovir alafenamide 25 mG (BIKTARVY) 1 Tablet(s) Oral daily  cholecalciferol 1000 Unit(s) Oral daily  enoxaparin Injectable 40 milliGRAM(s) SubCutaneous daily  gabapentin 300 milliGRAM(s) Oral three times a day  lidocaine   4% Patch 1 Patch Transdermal daily  QUEtiapine 200 milliGRAM(s) Oral at bedtime  sodium chloride 0.9%. 1000 milliLiter(s) (100 mL/Hr) IV Continuous <Continuous>    MEDICATIONS  (PRN):  LORazepam   Injectable 1 milliGRAM(s) IV Push once PRN prior to MRI  melatonin 3 milliGRAM(s) Oral at bedtime PRN Insomnia

## 2021-09-16 NOTE — PROGRESS NOTE ADULT - ASSESSMENT
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 MG. Awaiting transfer to [facility] for continuation of care.

## 2021-09-17 ENCOUNTER — TRANSCRIPTION ENCOUNTER (OUTPATIENT)
Age: 31
End: 2021-09-17

## 2021-09-17 VITALS
RESPIRATION RATE: 18 BRPM | HEART RATE: 80 BPM | OXYGEN SATURATION: 99 % | TEMPERATURE: 98 F | SYSTOLIC BLOOD PRESSURE: 125 MMHG | DIASTOLIC BLOOD PRESSURE: 74 MMHG

## 2021-09-17 PROCEDURE — 99239 HOSP IP/OBS DSCHRG MGMT >30: CPT

## 2021-09-17 RX ADMIN — LIDOCAINE 1 PATCH: 4 CREAM TOPICAL at 12:19

## 2021-09-17 RX ADMIN — LIDOCAINE 1 PATCH: 4 CREAM TOPICAL at 00:58

## 2021-09-17 RX ADMIN — Medication 15 MILLIGRAM(S): at 05:57

## 2021-09-17 RX ADMIN — Medication 15 MILLIGRAM(S): at 13:54

## 2021-09-17 RX ADMIN — GABAPENTIN 300 MILLIGRAM(S): 400 CAPSULE ORAL at 13:53

## 2021-09-17 RX ADMIN — BICTEGRAVIR SODIUM, EMTRICITABINE, AND TENOFOVIR ALAFENAMIDE FUMARATE 1 TABLET(S): 30; 120; 15 TABLET ORAL at 12:19

## 2021-09-17 RX ADMIN — GABAPENTIN 300 MILLIGRAM(S): 400 CAPSULE ORAL at 05:57

## 2021-09-17 RX ADMIN — Medication 1000 UNIT(S): at 12:19

## 2021-09-17 NOTE — PROGRESS NOTE ADULT - ATTENDING COMMENTS
31 M, congenital HIV, GBS, admitted with acute on chronic LE weakness.  Pt with recent admission for similar, was discharged to [facility].  Neuro input appreciated.  Dc planning, COVID swab negative.  Plan for DC today if insurance authorization approved.  DC time 40 minutes
31M with congenital HIV, suspected GBS here w/ progressive LE weakness. Unable to care for self in light of worsening ambulatory status. Neuro on board. Additional workup in progress. Pt seen and evaluated at bedside. No improvement of weakness. Otherwise, tolerating PO/meds. No new complaints. F/u serologies. Neuro recs appreciated.    Plan as above.
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     Patient seen and examined. Patient states that spacticity and weakness has been going on for years - baclofen does not help it. Supposed to be increased from 15mg BID to TID after his hospital stay at [facility] earlier this week. 1/5 strength LLE, 4/5 strength RLE, +hyperreflexia.     #LE weakness/Spacticity   - Patient diagnosed w/ GBS but unclear why on baclofen  - Discussed with neuro Dr. Wilks --> concerned for spastic quadriparesis from cervical myelopathy and not GBS as underlying diagnosis. Recommends MRI C-T-L spine and MRI brain with and without.   - PT/OT eval     #Congential HIV  - C/w Biktarvy  - Check CD4 count and VL     Discussed with resident Dr. Ren
31M with congenital HIV, suspected GBS here w/ progressive LE weakness. Unable to care for self in light of worsening ambulatory status. Neuro on board. Additional workup in progress. Pt seen and evaluated at bedside. No improvement of weakness. Still w/ spasms. Otherwise, tolerating PO/meds. No new complaints. F/u serologies. Replete Mg. Neuro recs appreciated.    Plan as above.
31 M, congenital HIV, GBS, admitted with acute on chronic LE weakness.  Pt with recent admission for similar, was discharged to [facility].  Neuro input appreciated.  Dc planning, COVID swab negative.  Plan for DC today if insurance authorization approved.  DC time 40 minutes
31 M, congenital HIV, GBS, admitted with acute on chronic LE weakness.  Pt with recent admission for similar, was discharged to [facility].  Neuro input appreciated.  Dc planning, COVID swab negative
31 M, congenital HIV, GBS, admitted with acute on chronic LE weakness.  Pt with recent admission (prior records reviewed) for similar, was discharged to [facility].  Neuro input appreciated.  Dc planning
31 M, congenital HIV, GBS, admitted with acute on chronic LE weakness.  Pt with recent admission for similar, was discharged to [facility].  Neuro input appreciated.  Dc planning, COVID swab today

## 2021-09-17 NOTE — DISCHARGE NOTE NURSING/CASE MANAGEMENT/SOCIAL WORK - NSDCPNINST_GEN_ALL_CORE
pt alert and oriented, stable to discharge home. pt vitals stable, denies pain. IV removed. Discharge instruction provided with copy

## 2021-09-17 NOTE — PROGRESS NOTE ADULT - PROBLEM SELECTOR PLAN 4
DVT ppx: lovenox  Diet: regular  Dispo- PT evaluation, social work, likely d/c to [facility] DVT ppx: lovenox  Diet: regular  Dispo- PT evaluation, social work, d'c to [facility] today     >33 minutes spent on discharge planning

## 2021-09-17 NOTE — PROGRESS NOTE ADULT - SUBJECTIVE AND OBJECTIVE BOX
INCOMPLETE  Patient:  NELSON MITCHELL  [id number]    Mr. 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     Progress Note    Interval events: No acute events overnight. Patient reports is feel less pain than before. Likely d/c today.      MEDICATIONS  (STANDING):  baclofen 15 milliGRAM(s) Oral three times a day  bictegravir 50 mG/emtricitabine 200 mG/tenofovir alafenamide 25 mG (BIKTARVY) 1 Tablet(s) Oral daily  cholecalciferol 1000 Unit(s) Oral daily  enoxaparin Injectable 40 milliGRAM(s) SubCutaneous daily  gabapentin 300 milliGRAM(s) Oral three times a day  lidocaine   4% Patch 1 Patch Transdermal daily  QUEtiapine 200 milliGRAM(s) Oral at bedtime  sodium chloride 0.9%. 1000 milliLiter(s) (100 mL/Hr) IV Continuous <Continuous>    MEDICATIONS  (PRN):  LORazepam   Injectable 1 milliGRAM(s) IV Push once PRN prior to MRI  melatonin 3 milliGRAM(s) Oral at bedtime PRN Insomnia    I&O's Summary    17 Sep 2021 07:01  -  17 Sep 2021 15:20  --------------------------------------------------------  IN: 0 mL / OUT: 500 mL / NET: -500 mL    Vital Signs Last 24 Hrs  T(C): 36.9 (17 Sep 2021 13:00), Max: 36.9 (16 Sep 2021 22:06)  T(F): 98.5 (17 Sep 2021 13:00), Max: 98.5 (17 Sep 2021 13:00)  HR: 80 (17 Sep 2021 13:00) (74 - 85)  BP: 125/74 (17 Sep 2021 13:00) (116/63 - 138/83)  BP(mean): --  RR: 18 (17 Sep 2021 13:00) (17 - 18)  SpO2: 99% (17 Sep 2021 13:00) (99% - 100%)    PHYSICAL EXAM:  GENERAL: NAD  EYES: EOMI, PERRLA, conjunctiva and sclera clear  NECK:  No JVD  CHEST/LUNG: CTABL ; No wheeze  HEART: RRR; No murmurs  ABDOMEN: Soft, Nontender, Nondistended; Bowel sounds present  : No Brizuela  EXTREMITIES:  2+ Peripheral Pulses, No edema  PSYCH: AAOx3  NEUROLOGY: b/l LE weakness 1-2/5, more prominent on left. Pain on palpation of left calf. b/l Ptosis. CN II-XII intact. +hyperreflexia. Upper extremities normal   SKIN: No rashes or lesions. No sacral ulcers.    MEDICATIONS  (STANDING):  baclofen 15 milliGRAM(s) Oral three times a day  bictegravir 50 mG/emtricitabine 200 mG/tenofovir alafenamide 25 mG (BIKTARVY) 1 Tablet(s) Oral daily  cholecalciferol 1000 Unit(s) Oral daily  enoxaparin Injectable 40 milliGRAM(s) SubCutaneous daily  gabapentin 300 milliGRAM(s) Oral three times a day  lidocaine   4% Patch 1 Patch Transdermal daily  QUEtiapine 200 milliGRAM(s) Oral at bedtime  sodium chloride 0.9%. 1000 milliLiter(s) (100 mL/Hr) IV Continuous <Continuous>    MEDICATIONS  (PRN):  LORazepam   Injectable 1 milliGRAM(s) IV Push once PRN prior to MRI  melatonin 3 milliGRAM(s) Oral at bedtime PRN Insomnia        LABS:  CBC 09-14-21 @ 07:52                        13.4   3.45  )-----------( 244                   41.0       Hgb trend: 13.4 <-- , 13.9 <--   WBC trend: 3.45 <-- , 4.28 <--       CMP 09-14-21 @ 07:52    139  |  104  |  10  ----------------------------<  123<H>  3.8   |  21<L>  |  0.93    Phos  4.0     09-14  Mg     1.50     09-14    TPro  6.7  /  Alb  3.8  /  TBili  0.4  /  DBili  x   /  AST  19  /  ALT  15  /  AlkPhos  69  09-14      Serum Cr trend: 0.93 <-- , 0.92 <--         ABG Trend:     VBG Trend:       MICROBIOLOGY:       RADIOLOGY:  [ ] Reviewed and interpreted by me    EKG:   Patient:  NELSON MITCHELL  [id number]    Mr. 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     Progress Note    Interval events: No acute events overnight. Patient reports is feel less pain than before. Patient for discharge today.     Vital Signs Last 24 Hrs  T(C): 36.9 (17 Sep 2021 13:00), Max: 36.9 (16 Sep 2021 22:06)  T(F): 98.5 (17 Sep 2021 13:00), Max: 98.5 (17 Sep 2021 13:00)  HR: 80 (17 Sep 2021 13:00) (74 - 85)  BP: 125/74 (17 Sep 2021 13:00) (116/63 - 138/83)  BP(mean): --  RR: 18 (17 Sep 2021 13:00) (17 - 18)  SpO2: 99% (17 Sep 2021 13:00) (99% - 100%)    PHYSICAL EXAM:  GENERAL: NAD  EYES: EOMI, PERRLA, conjunctiva and sclera clear  NECK:  No JVD  CHEST/LUNG: CTABL ; No wheeze  HEART: RRR; No murmurs  ABDOMEN: Soft, Nontender, Nondistended; Bowel sounds present  : No Brizuela  EXTREMITIES:  2+ Peripheral Pulses, No edema  PSYCH: AAOx3  NEUROLOGY: b/l LE weakness 1-2/5, more prominent on left. Pain on palpation of left calf. b/l Ptosis. CN II-XII intact. +hyperreflexia. Upper extremities normal   SKIN: No rashes or lesions. No sacral ulcers.    MEDICATIONS  (STANDING):  baclofen 15 milliGRAM(s) Oral three times a day  bictegravir 50 mG/emtricitabine 200 mG/tenofovir alafenamide 25 mG (BIKTARVY) 1 Tablet(s) Oral daily  cholecalciferol 1000 Unit(s) Oral daily  enoxaparin Injectable 40 milliGRAM(s) SubCutaneous daily  gabapentin 300 milliGRAM(s) Oral three times a day  lidocaine   4% Patch 1 Patch Transdermal daily  QUEtiapine 200 milliGRAM(s) Oral at bedtime  sodium chloride 0.9%. 1000 milliLiter(s) (100 mL/Hr) IV Continuous <Continuous>    MEDICATIONS  (PRN):  LORazepam   Injectable 1 milliGRAM(s) IV Push once PRN prior to MRI  melatonin 3 milliGRAM(s) Oral at bedtime PRN Insomnia    LABS: No new Labs.                         RADIOLOGY, EKG & ADDITIONAL TESTS: Reviewed.          Patient:  NELSON MITCHELL  [id number]    Mr. 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     Progress Note    Interval events: No acute events overnight. Patient reports feels less pain than before. Patient for discharge today.     Vital Signs Last 24 Hrs  T(C): 36.9 (17 Sep 2021 13:00), Max: 36.9 (16 Sep 2021 22:06)  T(F): 98.5 (17 Sep 2021 13:00), Max: 98.5 (17 Sep 2021 13:00)  HR: 80 (17 Sep 2021 13:00) (74 - 85)  BP: 125/74 (17 Sep 2021 13:00) (116/63 - 138/83)  BP(mean): --  RR: 18 (17 Sep 2021 13:00) (17 - 18)  SpO2: 99% (17 Sep 2021 13:00) (99% - 100%)    PHYSICAL EXAM:  GENERAL: NAD  EYES: EOMI, PERRLA, conjunctiva and sclera clear  NECK:  No JVD  CHEST/LUNG: CTABL ; No wheeze  HEART: RRR; No murmurs  ABDOMEN: Soft, Nontender, Nondistended; Bowel sounds present  : No Brizuela  EXTREMITIES:  2+ Peripheral Pulses, No edema  PSYCH: AAOx3  NEUROLOGY: b/l LE weakness 1-2/5, more prominent on left. Pain on palpation of left calf. b/l Ptosis. CN II-XII intact. +hyperreflexia. Upper extremities normal   SKIN: No rashes or lesions. No sacral ulcers.    MEDICATIONS  (STANDING):  baclofen 15 milliGRAM(s) Oral three times a day  bictegravir 50 mG/emtricitabine 200 mG/tenofovir alafenamide 25 mG (BIKTARVY) 1 Tablet(s) Oral daily  cholecalciferol 1000 Unit(s) Oral daily  enoxaparin Injectable 40 milliGRAM(s) SubCutaneous daily  gabapentin 300 milliGRAM(s) Oral three times a day  lidocaine   4% Patch 1 Patch Transdermal daily  QUEtiapine 200 milliGRAM(s) Oral at bedtime  sodium chloride 0.9%. 1000 milliLiter(s) (100 mL/Hr) IV Continuous <Continuous>    MEDICATIONS  (PRN):  LORazepam   Injectable 1 milliGRAM(s) IV Push once PRN prior to MRI  melatonin 3 milliGRAM(s) Oral at bedtime PRN Insomnia    LABS: No new Labs.                         RADIOLOGY, EKG & ADDITIONAL TESTS: Reviewed.

## 2021-09-17 NOTE — PROGRESS NOTE ADULT - REASON FOR ADMISSION
LE weakness

## 2021-09-17 NOTE — PROGRESS NOTE ADULT - PROBLEM SELECTOR PROBLEM 3
GBS (Guillain-[place] syndrome)
GBS (Guillain-[place] syndrome)
GBS (Guillain-[place] syndrome)
GBS (Guillain-[place] syndrome)
GBS (Guillain-[place] syndrome)
GBS (Guillain-[place] syndrome)
GBS (Guillain-[place] syndrome)
GBS (Guillain-[place] syndrome)

## 2021-09-17 NOTE — PROGRESS NOTE ADULT - PROBLEM SELECTOR PLAN 2
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 Easton Hunter on 9/29 at 3:15 at [address] [place] [address]      - recs to continue Biktarvy (per phone conversation).      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 Easton Hunter on 9/29 at 3:15 at [address] [place] [address]      - recs to continue Biktarvy (per phone conversation).

## 2021-09-17 NOTE — PROGRESS NOTE ADULT - PROBLEM SELECTOR PROBLEM 1
Complaints of weakness of lower extremity

## 2021-09-17 NOTE — PROGRESS NOTE ADULT - PROBLEM SELECTOR PLAN 3
on baclofen and gabapentin at home  -Baclofen d/c on 9/16, patient wishes to continue, send prescription  -Continue gabapentin home regimen on baclofen and gabapentin at home  -Continue baclofen   -Continue gabapentin home regimen

## 2021-09-17 NOTE — DISCHARGE NOTE NURSING/CASE MANAGEMENT/SOCIAL WORK - PATIENT PORTAL LINK FT
You can access the FollowMyHealth Patient Portal offered by [facility] by registering at the following website: [email or web address]. By joining [employer]’s FollowMyHealth portal, you will also be able to view your health information using other applications (apps) compatible with our system.

## 2021-09-17 NOTE — PROGRESS NOTE ADULT - ASSESSMENT
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 MG. Awaiting transfer to [facility] for continuation of care. 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 MG. Awaiting transfer to [facility] for continuation of care. Patient for discharge to [facility] today.

## 2021-09-19 LAB
COPPER SERPL-MCNC: 104 UG/DL — SIGNIFICANT CHANGE UP (ref 69–132)
ZINC SERPL-MCNC: 76 UG/DL — SIGNIFICANT CHANGE UP (ref 44–115)

## 2021-09-22 LAB — MUSK IGG SER IA-MCNC: <1 U/ML — SIGNIFICANT CHANGE UP

## 2021-09-27 LAB — ACHR MOD AB SER-ACNC: SIGNIFICANT CHANGE UP %

## 2021-09-29 ENCOUNTER — APPOINTMENT (OUTPATIENT)
Dept: INFECTIOUS DISEASE | Facility: CLINIC | Age: 31
End: 2021-09-29

## 2021-09-29 ENCOUNTER — NON-APPOINTMENT (OUTPATIENT)
Age: 31
End: 2021-09-29

## 2021-10-01 ENCOUNTER — NON-APPOINTMENT (OUTPATIENT)
Age: 31
End: 2021-10-01

## 2021-10-29 NOTE — ED ADULT NURSE NOTE - PRO INTERPRETER NEED 2
Immunization(s) given during visit:    Immunizations Administered     Name Date Dose Route    Influenza, Quadv, adjuvanted, 65 yrs +, IM, PF (Fluad) 10/29/2021 0.5 mL Intramuscular    Site: Deltoid- Right    Lot: [id number]    NDC: 61033-180-67          Most recent Vaccine Information Sheet  given to pt English

## 2021-12-10 NOTE — ED ADULT TRIAGE NOTE - NS ED NURSE DIRECT TO ROOM YN
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 Prior baseline 1.1 to 1.3mg/dl. No proteinuria/hematuria. Suggests chronic underlying cardiorenal effects     Decompensated Systolic CHF: EF <91%. ++ peripheral edema. On Bumex drip     Hyponatremia: 2 to hypervolemia. Loving drop to 122-> drinking more water than he is letting on. Na back up to 126 and holding     DM2: HgbA1c stable    Hyperkalemia: mild/borderline. 2 to supplementation. Plan/Recommendations:  Primacor per AHF team  Ct Bumex drip at 2mg/hr  Formal FR 1.2L/day  Consideration for Tolvaptan if Na remains low despite loop diuresis  Holding KCl-> hemolyzed specimen  IV Venofer  Strict I/Os, daily weights  Avoid nephrotoxins  AM labs      Subjective:  C/o dry mouth. Reports anxiety overnight -> after being told he can not have more ice chips. UOP 4.2L yesterday. Remains on Primacor/Bumex drip. No cramping.     ROS:   No nausea, no vomiting  No chest pain, no shortness of breath    Exam:  Visit Vitals  /68   Pulse (!) 114   Temp 97.6 °F (36.4 °C)   Resp 22   Ht 5' 9\" (1.753 m)   Wt 129.7 kg (285 lb 15 oz)   SpO2 98%   BMI 42.23 kg/m²     Wt Readings from Last 3 Encounters:   12/10/21 129.7 kg (285 lb 15 oz)   05/28/21 102.2 kg (225 lb 5 oz)   10/01/19 90.3 kg (199 lb)       Intake/Output Summary (Last 24 hours) at 12/10/2021 1148  Last data filed at 12/10/2021 1040  Gross per 24 hour   Intake --   Output 3425 ml   Net -1039 ml       Gen: NAD/Obese  HEENT: AT/NC  Lungs/Chest wall: Clear. No accessory muscle use. Cardiovascular: Regular rate, normal rhythm. Abdomen/: Soft, NT, ND, BS+. Ext: ++ peripheral edema  CNS: alert and awake.  Answers appropriately      Current Facility-Administered Medications   Medication Dose Route Frequency Last Admin    lactulose (CHRONULAC) 10 gram/15 mL solution 45 mL  30 g Oral DAILY 45 mL at 12/08/21 1039    allopurinoL (ZYLOPRIM) tablet 100 mg  100 mg Oral DAILY 100 mg at 12/10/21 0806    senna-docusate (PERICOLACE) 8.6-50 mg per tablet 1 Tablet  1 Tablet Oral QHS 1 Tablet at 12/09/21 2131    apixaban (ELIQUIS) tablet 5 mg  5 mg Oral BID 5 mg at 12/10/21 2605    aspirin delayed-release tablet 81 mg  81 mg Oral DAILY 81 mg at 12/10/21 0806    carvediloL (COREG) tablet 3.125 mg  3.125 mg Oral BID WITH MEALS 3.125 mg at 12/10/21 0806    digoxin (LANOXIN) tablet 0.125 mg  0.125 mg Oral DAILY 0.125 mg at 12/10/21 0806    levothyroxine (SYNTHROID) tablet 125 mcg  125 mcg Oral  mcg at 12/10/21 0806    melatonin tablet 3 mg  3 mg Oral QHS 3 mg at 12/09/21 2132    [Held by provider] spironolactone (ALDACTONE) tablet 50 mg  50 mg Oral BID      insulin lispro (HUMALOG) injection   SubCUTAneous AC&HS 2 Units at 12/10/21 0810    glucose chewable tablet 16 g  4 Tablet Oral PRN      dextrose (D50W) injection syrg 12.5-25 g  12.5-25 g IntraVENous PRN      glucagon (GLUCAGEN) injection 1 mg  1 mg IntraMUSCular PRN      sodium chloride (NS) flush 5-40 mL  5-40 mL IntraVENous Q8H 10 mL at 12/09/21 2200    sodium chloride (NS) flush 5-40 mL  5-40 mL IntraVENous PRN      acetaminophen (TYLENOL) tablet 650 mg  650 mg Oral Q6H  mg at 12/06/21 1044    Or    acetaminophen (TYLENOL) suppository 650 mg  650 mg Rectal Q6H PRN      polyethylene glycol (MIRALAX) packet 17 g  17 g Oral DAILY PRN      ondansetron (ZOFRAN ODT) tablet 4 mg  4 mg Oral Q8H PRN 4 mg at 12/07/21 1820    Or    ondansetron (ZOFRAN) injection 4 mg  4 mg IntraVENous Q6H PRN 4 mg at 12/08/21 0117    milrinone (PRIMACOR) 20 MG/100 ML D5W infusion  0.2 mcg/kg/min IntraVENous CONTINUOUS 0.2 mcg/kg/min at 12/09/21 1936    bumetanide (BUMEX) 0.25 mg/mL infusion  2 mg/hr IntraVENous CONTINUOUS 2 mg/hr at 12/10/21 0506    dextrose (D50W) injection syrg 12.5-25 g  12.5-25 g IntraVENous PRN      sodium chloride (NS) flush 5-40 mL  5-40 mL IntraVENous Q8H 10 mL at 12/09/21 2200    sodium chloride (NS) flush 5-40 mL  5-40 mL IntraVENous PRN         Labs/Data:    Lab Results   Component Value Date/Time    WBC 13.3 (H) 12/10/2021 05:04 AM    HGB 11.6 (L) 12/10/2021 05:04 AM    HCT 36.6 12/10/2021 05:04 AM    PLATELET 954 77/44/2331 05:04 AM    MCV 82.8 12/10/2021 05:04 AM       Lab Results   Component Value Date/Time    Sodium 126 (L) 12/10/2021 05:04 AM    Potassium HEMOLYZED,RECOLLECT REQUESTED 12/10/2021 05:04 AM    Chloride 95 (L) 12/10/2021 05:04 AM    CO2 23 12/10/2021 05:04 AM    Anion gap 8 12/10/2021 05:04 AM    Glucose 130 (H) 12/10/2021 05:04 AM    BUN 51 (H) 12/10/2021 05:04 AM    Creatinine 1.61 (H) 12/10/2021 05:04 AM    BUN/Creatinine ratio 32 (H) 12/10/2021 05:04 AM    GFR est AA >60 12/10/2021 05:04 AM    GFR est non-AA 50 (L) 12/10/2021 05:04 AM    Calcium 8.4 (L) 12/10/2021 05:04 AM       Patient seen and examined. Chart reviewed. Labs, data and other pertinent notes reviewed in last 24 hrs.     Discussed with patient     Signed by:  Chan Mendenhall MD  [id number] [employer] Yes

## 2022-01-20 NOTE — ED PROVIDER NOTE - NS ED MD EM SELECTION
[de-identified] : 49 year F with PMH PETEY positive and new complaint of depression presents for initial CPE and requesting treatment. Pt denies CP/SOB, fever/chills, n/v/d/c.
[address] Detailed

## 2022-01-21 PROBLEM — B20 HUMAN IMMUNODEFICIENCY VIRUS [HIV] DISEASE: Chronic | Status: INACTIVE | Noted: 2018-11-15 | Resolved: 2020-10-11

## 2022-01-21 PROBLEM — G61.0 GUILLAIN-BARRE SYNDROME: Chronic | Status: INACTIVE | Noted: 2018-10-13 | Resolved: 2020-10-11

## 2022-01-24 NOTE — CHART NOTE - NSCHARTNOTESELECT_GEN_ALL_CORE
Event Note
Event Note/SOCIAL WORK NOTE"
Event Note/Social Work
Event Note/Social Work
Social Work/Event Note
Maria G Jeffries

## 2022-01-25 ENCOUNTER — EMERGENCY (EMERGENCY)
Facility: HOSPITAL | Age: 32
LOS: 1 days | Discharge: HOME | End: 2022-01-25
Attending: EMERGENCY MEDICINE
Payer: COMMERCIAL

## 2022-01-25 VITALS
SYSTOLIC BLOOD PRESSURE: 129 MMHG | HEART RATE: 97 BPM | DIASTOLIC BLOOD PRESSURE: 88 MMHG | OXYGEN SATURATION: 98 % | TEMPERATURE: 98 F | RESPIRATION RATE: 18 BRPM

## 2022-01-25 VITALS — HEIGHT: 71 IN

## 2022-01-25 DIAGNOSIS — Z98.890 OTHER SPECIFIED POSTPROCEDURAL STATES: Chronic | ICD-10-CM

## 2022-01-25 PROCEDURE — 99284 EMERGENCY DEPT VISIT MOD MDM: CPT

## 2022-01-25 PROCEDURE — 99283 EMERGENCY DEPT VISIT LOW MDM: CPT

## 2022-01-25 RX ORDER — ACETAMINOPHEN 500 MG
650 TABLET ORAL ONCE
Refills: 0 | Status: COMPLETED | OUTPATIENT
Start: 2022-01-25 | End: 2022-01-25

## 2022-01-25 RX ADMIN — Medication 650 MILLIGRAM(S): at 21:06

## 2022-01-25 NOTE — ED PROVIDER NOTE - NSICDXPASTMEDICALHX_GEN_ALL_CORE_FT
PAST MEDICAL HISTORY:  Asthma     Chronic sinusitis     Closed fracture of multiple ribs of right side, initial encounter     Cocaine abuse     CVA (cerebral vascular accident)     GBS (Guillain [place] syndrome)     HIV (human immunodeficiency virus infection) from birth    Homeless

## 2022-01-25 NOTE — ED ADULT TRIAGE NOTE - CHIEF COMPLAINT QUOTE
patient with complaints of chest pain x1 hour. patient states that it started while shopping. denies any difficulty breathing

## 2022-01-25 NOTE — ED PROVIDER NOTE - OBJECTIVE STATEMENT
pt with 1 hr substernal achy chest pain no + perc criteria compliant with hiv meds. no cough no headache no hemotysis no sob. had covid vaccines. no abd pain no n/v/d. no rash

## 2022-01-25 NOTE — ED PROVIDER NOTE - PATIENT PORTAL LINK FT
You can access the FollowMyHealth Patient Portal offered by [facility] by registering at the following website: [email or web address]. By joining [employer]’s FollowMyHealth portal, you will also be able to view your health information using other applications (apps) compatible with our system.

## 2022-01-27 ENCOUNTER — INPATIENT (INPATIENT)
Facility: HOSPITAL | Age: 32
LOS: 5 days | Discharge: HOME | End: 2022-02-02
Attending: INTERNAL MEDICINE | Admitting: INTERNAL MEDICINE
Payer: MEDICAID

## 2022-01-27 VITALS
HEIGHT: 71 IN | OXYGEN SATURATION: 100 % | TEMPERATURE: 98 F | RESPIRATION RATE: 15 BRPM | DIASTOLIC BLOOD PRESSURE: 91 MMHG | SYSTOLIC BLOOD PRESSURE: 147 MMHG | HEART RATE: 80 BPM

## 2022-01-27 DIAGNOSIS — R53.1 WEAKNESS: ICD-10-CM

## 2022-01-27 DIAGNOSIS — R29.898 OTHER SYMPTOMS AND SIGNS INVOLVING THE MUSCULOSKELETAL SYSTEM: ICD-10-CM

## 2022-01-27 DIAGNOSIS — E87.6 HYPOKALEMIA: ICD-10-CM

## 2022-01-27 DIAGNOSIS — Z98.890 OTHER SPECIFIED POSTPROCEDURAL STATES: Chronic | ICD-10-CM

## 2022-01-27 DIAGNOSIS — G61.0 GUILLAIN-BARRE SYNDROME: ICD-10-CM

## 2022-01-27 DIAGNOSIS — B20 HUMAN IMMUNODEFICIENCY VIRUS [HIV] DISEASE: ICD-10-CM

## 2022-01-27 DIAGNOSIS — Z29.9 ENCOUNTER FOR PROPHYLACTIC MEASURES, UNSPECIFIED: ICD-10-CM

## 2022-01-27 LAB
A1C WITH ESTIMATED AVERAGE GLUCOSE RESULT: 4.7 % — SIGNIFICANT CHANGE UP (ref 4–5.6)
ALBUMIN SERPL ELPH-MCNC: 4.5 G/DL — SIGNIFICANT CHANGE UP (ref 3.3–5)
ALP SERPL-CCNC: 87 U/L — SIGNIFICANT CHANGE UP (ref 40–120)
ALT FLD-CCNC: 22 U/L — SIGNIFICANT CHANGE UP (ref 4–41)
ANION GAP SERPL CALC-SCNC: 17 MMOL/L — HIGH (ref 7–14)
AST SERPL-CCNC: 36 U/L — SIGNIFICANT CHANGE UP (ref 4–40)
BILIRUB SERPL-MCNC: 1.2 MG/DL — SIGNIFICANT CHANGE UP (ref 0.2–1.2)
BUN SERPL-MCNC: 15 MG/DL — SIGNIFICANT CHANGE UP (ref 7–23)
CALCIUM SERPL-MCNC: 9 MG/DL — SIGNIFICANT CHANGE UP (ref 8.4–10.5)
CHLORIDE SERPL-SCNC: 96 MMOL/L — LOW (ref 98–107)
CO2 SERPL-SCNC: 24 MMOL/L — SIGNIFICANT CHANGE UP (ref 22–31)
CREAT SERPL-MCNC: 0.97 MG/DL — SIGNIFICANT CHANGE UP (ref 0.5–1.3)
ERYTHROCYTE [SEDIMENTATION RATE] IN BLOOD: 16 MM/HR — HIGH (ref 1–15)
ESTIMATED AVERAGE GLUCOSE: 88 — SIGNIFICANT CHANGE UP
FLUAV AG NPH QL: SIGNIFICANT CHANGE UP
FLUBV AG NPH QL: SIGNIFICANT CHANGE UP
GLUCOSE SERPL-MCNC: 68 MG/DL — LOW (ref 70–99)
HCT VFR BLD CALC: 45.8 % — SIGNIFICANT CHANGE UP (ref 39–50)
HGB BLD-MCNC: 14.3 G/DL — SIGNIFICANT CHANGE UP (ref 13–17)
MCHC RBC-ENTMCNC: 28.3 PG — SIGNIFICANT CHANGE UP (ref 27–34)
MCHC RBC-ENTMCNC: 31.2 GM/DL — LOW (ref 32–36)
MCV RBC AUTO: 90.5 FL — SIGNIFICANT CHANGE UP (ref 80–100)
NRBC # BLD: 0 /100 WBCS — SIGNIFICANT CHANGE UP
NRBC # FLD: 0 K/UL — SIGNIFICANT CHANGE UP
PLATELET # BLD AUTO: 252 K/UL — SIGNIFICANT CHANGE UP (ref 150–400)
POTASSIUM SERPL-MCNC: 3.1 MMOL/L — LOW (ref 3.5–5.3)
POTASSIUM SERPL-SCNC: 3.1 MMOL/L — LOW (ref 3.5–5.3)
PROT SERPL-MCNC: 7.8 G/DL — SIGNIFICANT CHANGE UP (ref 6–8.3)
RBC # BLD: 5.06 M/UL — SIGNIFICANT CHANGE UP (ref 4.2–5.8)
RBC # FLD: 12.5 % — SIGNIFICANT CHANGE UP (ref 10.3–14.5)
RSV RNA NPH QL NAA+NON-PROBE: SIGNIFICANT CHANGE UP
SARS-COV-2 RNA SPEC QL NAA+PROBE: SIGNIFICANT CHANGE UP
SODIUM SERPL-SCNC: 137 MMOL/L — SIGNIFICANT CHANGE UP (ref 135–145)
T4 AB SER-ACNC: 6.97 UG/DL — SIGNIFICANT CHANGE UP (ref 5.1–13)
TSH SERPL-MCNC: 0.91 UIU/ML — SIGNIFICANT CHANGE UP (ref 0.27–4.2)
VIT B12 SERPL-MCNC: 382 PG/ML — SIGNIFICANT CHANGE UP (ref 200–900)
VIT B12 SERPL-MCNC: 386 PG/ML — SIGNIFICANT CHANGE UP (ref 200–900)
WBC # BLD: 6.19 K/UL — SIGNIFICANT CHANGE UP (ref 3.8–10.5)
WBC # FLD AUTO: 6.19 K/UL — SIGNIFICANT CHANGE UP (ref 3.8–10.5)

## 2022-01-27 PROCEDURE — 99222 1ST HOSP IP/OBS MODERATE 55: CPT

## 2022-01-27 PROCEDURE — 99233 SBSQ HOSP IP/OBS HIGH 50: CPT

## 2022-01-27 PROCEDURE — 99222 1ST HOSP IP/OBS MODERATE 55: CPT | Mod: GC

## 2022-01-27 PROCEDURE — 71045 X-RAY EXAM CHEST 1 VIEW: CPT | Mod: 26

## 2022-01-27 PROCEDURE — 93010 ELECTROCARDIOGRAM REPORT: CPT

## 2022-01-27 PROCEDURE — 99285 EMERGENCY DEPT VISIT HI MDM: CPT | Mod: 25

## 2022-01-27 RX ORDER — POTASSIUM CHLORIDE 20 MEQ
40 PACKET (EA) ORAL ONCE
Refills: 0 | Status: DISCONTINUED | OUTPATIENT
Start: 2022-01-27 | End: 2022-01-27

## 2022-01-27 RX ORDER — INFLUENZA VIRUS VACCINE 15; 15; 15; 15 UG/.5ML; UG/.5ML; UG/.5ML; UG/.5ML
0.5 SUSPENSION INTRAMUSCULAR ONCE
Refills: 0 | Status: DISCONTINUED | OUTPATIENT
Start: 2022-01-27 | End: 2022-02-02

## 2022-01-27 RX ORDER — QUETIAPINE FUMARATE 200 MG/1
300 TABLET, FILM COATED ORAL AT BEDTIME
Refills: 0 | Status: DISCONTINUED | OUTPATIENT
Start: 2022-01-27 | End: 2022-02-02

## 2022-01-27 RX ORDER — POTASSIUM CHLORIDE 20 MEQ
40 PACKET (EA) ORAL
Refills: 0 | Status: COMPLETED | OUTPATIENT
Start: 2022-01-27 | End: 2022-01-27

## 2022-01-27 RX ORDER — BICTEGRAVIR SODIUM, EMTRICITABINE, AND TENOFOVIR ALAFENAMIDE FUMARATE 30; 120; 15 MG/1; MG/1; MG/1
1 TABLET ORAL DAILY
Refills: 0 | Status: DISCONTINUED | OUTPATIENT
Start: 2022-01-27 | End: 2022-02-02

## 2022-01-27 RX ORDER — ACETAMINOPHEN 500 MG
650 TABLET ORAL EVERY 6 HOURS
Refills: 0 | Status: DISCONTINUED | OUTPATIENT
Start: 2022-01-27 | End: 2022-02-02

## 2022-01-27 RX ORDER — ENOXAPARIN SODIUM 100 MG/ML
40 INJECTION SUBCUTANEOUS DAILY
Refills: 0 | Status: DISCONTINUED | OUTPATIENT
Start: 2022-01-27 | End: 2022-02-02

## 2022-01-27 RX ORDER — ACETAMINOPHEN 500 MG
650 TABLET ORAL ONCE
Refills: 0 | Status: COMPLETED | OUTPATIENT
Start: 2022-01-27 | End: 2022-01-27

## 2022-01-27 RX ORDER — GABAPENTIN 400 MG/1
300 CAPSULE ORAL THREE TIMES A DAY
Refills: 0 | Status: DISCONTINUED | OUTPATIENT
Start: 2022-01-27 | End: 2022-02-02

## 2022-01-27 RX ORDER — FAMOTIDINE 10 MG/ML
20 INJECTION INTRAVENOUS ONCE
Refills: 0 | Status: COMPLETED | OUTPATIENT
Start: 2022-01-27 | End: 2022-01-27

## 2022-01-27 RX ORDER — LANOLIN ALCOHOL/MO/W.PET/CERES
3 CREAM (GRAM) TOPICAL AT BEDTIME
Refills: 0 | Status: DISCONTINUED | OUTPATIENT
Start: 2022-01-27 | End: 2022-02-02

## 2022-01-27 RX ORDER — ONDANSETRON 8 MG/1
4 TABLET, FILM COATED ORAL EVERY 8 HOURS
Refills: 0 | Status: DISCONTINUED | OUTPATIENT
Start: 2022-01-27 | End: 2022-01-27

## 2022-01-27 RX ADMIN — GABAPENTIN 300 MILLIGRAM(S): 400 CAPSULE ORAL at 21:57

## 2022-01-27 RX ADMIN — Medication 40 MILLIEQUIVALENT(S): at 13:42

## 2022-01-27 RX ADMIN — Medication 650 MILLIGRAM(S): at 04:38

## 2022-01-27 RX ADMIN — Medication 40 MILLIEQUIVALENT(S): at 15:14

## 2022-01-27 RX ADMIN — Medication 30 MILLILITER(S): at 04:38

## 2022-01-27 RX ADMIN — BICTEGRAVIR SODIUM, EMTRICITABINE, AND TENOFOVIR ALAFENAMIDE FUMARATE 1 TABLET(S): 30; 120; 15 TABLET ORAL at 13:44

## 2022-01-27 RX ADMIN — QUETIAPINE FUMARATE 300 MILLIGRAM(S): 200 TABLET, FILM COATED ORAL at 21:58

## 2022-01-27 RX ADMIN — GABAPENTIN 300 MILLIGRAM(S): 400 CAPSULE ORAL at 13:43

## 2022-01-27 RX ADMIN — FAMOTIDINE 20 MILLIGRAM(S): 10 INJECTION INTRAVENOUS at 04:37

## 2022-01-27 RX ADMIN — Medication 650 MILLIGRAM(S): at 06:00

## 2022-01-27 NOTE — ED PROVIDER NOTE - NS ED ROS FT
CONST: no fevers, no chills, no trauma  EYES: no pain, no blurry vision   ENT: no sore throat, no epistaxis, no rhinorrhea  CV: +chest pain, no palpitations, no orthopnea, no extremity pain or swelling  RESP: no shortness of breath, no cough, no sputum, no pleurisy, no wheezing  ABD: no abdominal pain, no nausea, no vomiting, no diarrhea, no black or bloody stool  : no dysuria, no hematuria, no frequency, no urgency  MSK: no back pain, no neck pain, no extremity pain  NEURO: no headache, no sensory disturbances, +focal weakness, no dizziness  HEME: no easy bleeding or bruising  SKIN: no diaphoresis, no rash

## 2022-01-27 NOTE — ED ADULT TRIAGE NOTE - CHIEF COMPLAINT QUOTE
Pt. c/o chest tightness, lower back pain radiating to his b/l legs that began this morning. Endorses leg heaviness and difficulty ambulating.  Patient states he just found out his "L4-L5 is deteriorating." Denies any SOB, fevers, chills, n/v. PHx HIV+, Guillain-Barré syndrome.

## 2022-01-27 NOTE — CONSULT NOTE ADULT - ASSESSMENT
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 GBS initially diagnosed 2018, however LP at that time was negative. Furthermore, patient repeatedly presenting w/ similar symptoms, w/ repeated negative spinal cord MRIs and lack of areflexia/hyporeflexia. It is reasonable to question patient's initial diagnosis of GBS. Rather, patient's clinical history is more suggestive of vacuolar myelopathy (i.e., AIDS/HIV myelopathy), which usually manifests late in the course of HIV infection, with slowly progressive weakness of LE, gait disorder, sparing of UEs, and normal MRIs. What is less consistent with HIV myelopathy is patient's urinary retention, as HIV myelopathy is more commonly a/w urinary frequency and urgency. Pertinent labs that were wnl from 9/2021: B12 (396), MMA (147), homocysteine (7.7), TSH, CRP, ESR, copper, zinc, ACHM binding ab, ACH modulating ab, ACH binding ab, MuSK, and SHAI. Last recorded LP cell count 5/2021: glucose wnl, protein wnl, 0 cell count.     Impression: ***    Plan:  [] HIV viral load, T cell subset  [] Consider ID consult  [] CBC w/ diff, CMP, ESR, glucose, A1c, TSH, T4, B12, MMA, homocysteine  [] Will discuss utility of repeat MR imaging and LP with Neurology Attending in the AM   [] F/u CT imaging  [] NIFs and VC q4h -- f/u w/ Neurology 1/27/22 regarding discontinuation (i.e., if Attending agrees less likely GBS, can d/c)  [] Q4H neurological checks  [] PT/OT consult    * Case and plan to be discussed with Neurology Attending Dr. 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 GBS initially diagnosed 2018, however LP at that time was negative. Furthermore, patient repeatedly presenting w/ similar symptoms, w/ repeated negative spinal cord MRIs and lack of areflexia/hyporeflexia. It is reasonable to question patient's initial diagnosis of GBS. Rather, patient's clinical history is more suggestive of vacuolar myelopathy (i.e., AIDS/HIV myelopathy), which usually manifests late in the course of HIV infection, with slowly progressive weakness of LE, gait disorder, sparing of UEs, and normal MRIs. What is less consistent with HIV myelopathy is patient's urinary retention, as HIV myelopathy is more commonly a/w urinary frequency and urgency. Pertinent labs that were wnl from 9/2021: B12 (396), MMA (147), homocysteine (7.7), TSH, CRP, ESR, copper, zinc, ACHM binding ab, ACH modulating ab, ACH binding ab, MuSK, and SHAI. Last recorded LP cell count 5/2021: glucose wnl, protein wnl, 0 cell count. It is worth noting that there is a lack of evidence showing a relationship between the presence of HIV in CSF and the development of myelopathy. Generally, it is believed that aggressive antiretroviral therapy can lead to improvement of symptoms.    Impression: BLE weakness (L>R) a/w urinary incontinence w/ BLE hyperreflexia w/ h/o normal LP, normal spine MRIs, i/s/o congenital HIV. 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 Less likely GBS.     Plan:  [] HIV viral load, T cell subset  [] Consider ID consult  [] CBC w/ diff, CMP, ESR, glucose, A1c, TSH, T4, B12, MMA, homocysteine  [] Will discuss utility of repeat MR imaging and LP with Neurology Attending in the AM   [] F/u CT imaging  [] NIFs and VC q4h -- f/u w/ Neurology 1/27/22 regarding discontinuation (i.e., if Attending agrees less likely GBS, can d/c)  [] Q4H neurological checks  [] PT/OT consult  [] SW/CM consult - patient w/ questions regarding long term placement    * Case and plan to be discussed with Neurology Attending Dr. 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 GBS initially diagnosed 2018, however LP at that time was negative. Furthermore, patient repeatedly presenting w/ similar symptoms, w/ repeated negative spinal cord MRIs and lack of areflexia/hyporeflexia. It is reasonable to question patient's initial diagnosis of GBS. Rather, patient's clinical history is more suggestive of vacuolar myelopathy (i.e., AIDS/HIV myelopathy), which usually manifests late in the course of HIV infection, with slowly progressive weakness of LE, gait disorder, sparing of UEs, and normal MRIs. What is less consistent with HIV myelopathy is patient's urinary retention, as HIV myelopathy is more commonly a/w urinary frequency and urgency. Pertinent labs that were wnl from 9/2021: B12 (396), MMA (147), homocysteine (7.7), TSH, CRP, ESR, copper, zinc, ACHM binding ab, ACH modulating ab, ACH binding ab, MuSK, and SHAI. Last recorded LP cell count 5/2021: glucose wnl, protein wnl, 0 cell count. It is worth noting that there is a lack of evidence showing a relationship between the presence of HIV in CSF and the development of myelopathy. Generally, it is believed that aggressive antiretroviral therapy can lead to improvement of symptoms.    Impression: BLE weakness (L>R) a/w urinary incontinence w/ BLE hyperreflexia w/ h/o normal LP, normal spine MRIs, i/s/o congenital HIV. 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 Less likely GBS.     Plan:  [] HIV viral load, T cell subset  [] Consider ID consult  [] CBC w/ diff, CMP, ESR, glucose, A1c, TSH, T4, B12, MMA, homocysteine  [] Will discuss utility of repeat MR imaging and LP with Neurology Attending in the AM   [] F/u CT imaging  [] NIFs and VC q4h -- f/u w/ Neurology 1/27/22 regarding discontinuation (i.e., if Attending agrees less likely GBS, can d/c)  [] Q4H neurological checks  [] Fall precautions  [] PT/OT consult  [] SW/CM consult - patient w/ questions regarding long term placement    * Case and plan to be discussed with Neurology Attending Dr. 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 GBS initially diagnosed 2018, however LP at that time was negative. Furthermore, patient repeatedly presenting w/ similar symptoms, w/ repeated negative spinal cord MRIs and lack of areflexia/hyporeflexia. It is reasonable to question patient's initial diagnosis of GBS. Rather, patient's clinical history is more suggestive of vacuolar myelopathy (i.e., AIDS/HIV myelopathy), which usually manifests late in the course of HIV infection, with slowly progressive weakness of LE, gait disorder, sparing of UEs, and normal MRIs. What is less consistent with HIV myelopathy is patient's urinary retention, as HIV myelopathy is more commonly a/w urinary frequency and urgency. Pertinent labs that were wnl from 9/2021: B12 (396), MMA (147), homocysteine (7.7), TSH, CRP, ESR, copper, zinc, ACHM binding ab, ACH modulating ab, ACH binding ab, MuSK, and SHAI. Last recorded LP cell count 5/2021: glucose wnl, protein wnl, 0 cell count. It is worth noting that there is a lack of evidence showing a relationship between the presence of HIV in CSF and the development of myelopathy. Generally, it is believed that aggressive antiretroviral therapy can lead to improvement of symptoms.    Impression: BLE weakness (L>R) a/w urinary incontinence w/ BLE hyperreflexia w/ h/o normal LP, normal spine MRIs, i/s/o congenital HIV. 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 Less likely GBS.     Plan:  [] HIV viral load, T cell subset  [] Consider ID consult  [] CBC w/ diff, CMP, ESR, glucose, A1c, TSH, T4, B12, MMA, homocysteine  [] Will discuss utility of repeat MR imaging and LP with Neurology Attending in the AM   [] F/u CT imaging  [] NIFs and VC q4h -- f/u w/ Neurology 1/27/22 regarding discontinuation (i.e., if Attending agrees less likely GBS, can d/c)  [] Q4H neurological checks  [] Daily I/Os   [] Fall precautions  [] PT/OT consult  [] SW/CM consult - patient w/ questions regarding long term placement    * Case and plan to be discussed with Neurology Attending Dr. 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 GBS initially diagnosed 2018, however LP at that time was negative. Furthermore, patient repeatedly presenting w/ similar symptoms, w/ repeated negative spinal cord MRIs and lack of areflexia/hyporeflexia. It is reasonable to question patient's initial diagnosis of GBS. Rather, patient's clinical history is more suggestive of vacuolar myelopathy (i.e., AIDS/HIV myelopathy), which usually manifests late in the course of HIV infection, with slowly progressive weakness of LE, gait disorder, sparing of UEs, and normal MRIs. Pertinent labs that were wnl from 9/2021: B12 (396), MMA (147), homocysteine (7.7), TSH, CRP, ESR, copper, zinc, ACHM binding ab, ACH modulating ab, ACH binding ab, MuSK, and SHAI. Last recorded LP cell count 5/2021: glucose wnl, protein wnl, 0 cell count. It is worth noting that there is a lack of evidence showing a relationship between the presence of HIV in CSF and the development of myelopathy. Generally, it is believed that aggressive antiretroviral therapy can lead to improvement of symptoms.    Impression: BLE weakness (L>R) a/w urinary retention w/ BLE hyperreflexia w/ h/o normal LP, normal spine MRIs, i/s/o congenital HIV. 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 Less likely GBS.     Plan:  [] HIV viral load, T cell subset  [] Consider ID consult  [] CBC w/ diff, CMP, ESR, glucose, A1c, TSH, T4, B12, MMA, homocysteine  [] Will discuss utility of repeat MR imaging and LP with Neurology Attending in the AM   [] F/u CT imaging  [] NIFs and VC q4h -- f/u w/ Neurology 1/27/22 regarding discontinuation (i.e., if Attending agrees less likely GBS, can d/c)  [] Q4H neurological checks  [] Daily I/Os   [] Fall precautions  [] PT/OT consult  [] SW/CM consult - patient w/ questions regarding long term placement    * Case and plan to be discussed with Neurology Attending Dr. 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 GBS initially diagnosed 2018, however LP at that time was negative. Furthermore, patient repeatedly presenting w/ similar symptoms, w/ repeated negative spinal cord MRIs and lack of areflexia/hyporeflexia. It is reasonable to question patient's initial diagnosis of GBS. Rather, patient's clinical history is more suggestive of vacuolar myelopathy (i.e., AIDS/HIV myelopathy), which usually manifests late in the course of HIV infection, with slowly progressive weakness of LE, gait disorder, sparing of UEs, and normal MRIs. Pertinent labs that were wnl from 9/2021: B12 (396), MMA (147), homocysteine (7.7), TSH, CRP, ESR, copper, zinc, ACHM binding ab, ACH modulating ab, ACH binding ab, MuSK, and SHAI. Last recorded LP cell count 5/2021: glucose wnl, protein wnl, 0 cell count. It is worth noting that there is a lack of evidence showing a relationship between the presence of HIV in CSF and the development of myelopathy. Generally, it is believed that aggressive antiretroviral therapy can lead to improvement of symptoms.    Impression: BLE weakness (L>R) a/w urinary retention w/ BLE hyperreflexia w/ h/o normal LP, normal spine MRIs, i/s/o congenital HIV. 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 Less likely GBS.     Plan:  [] HIV viral load, T cell subset  [] Consider ID consult  [] CBC w/ diff, CMP, ESR, glucose, A1c, TSH, T4, B12, MMA, homocysteine  [] Will discuss utility of repeat MR imaging and LP with Neurology Attending in the AM   [] F/u CT imaging  [] NIFs and VC q4h -- f/u w/ Neurology 1/27/22 regarding discontinuation (i.e., if Attending agrees less likely GBS, can d/c)  [] Q4H neurological checks  [] Daily I/Os   [] Fall precautions  [] PT/OT consult  [] SW/CM consult - patient w/ questions regarding long term placement    * Case and plan to be discussed with Neurology Attending Dr. Wilks *      NEUROLOGY ATTENDING ADDENDUM      THIS PATIENT WAS EXAMINED AND EVALUATED BY ME DURING A PREVIOUS [facility] ADMISSION.  HE DOES NOT HAVE AND DID NOT HAVE GBS!!!!!  The DIAGNOSIS OF GBS MADE AT [place] WAS, IN RETROSPECT,  INCORRECT.  SEE THE NEUROLOGY CONSULT NOTE DATED 9/10/21 WHICH CONTAINS MY OBSERVATIONS AND ASSESSMENT FROM THAT PREVIOUS HOSPITALIZATION.      I personally interviewed, examined, and participated in the care of this patient, on rounds 1/27/22 with the neurology consult service team.  Reviewed findings and management plan with the team.  In addition to or instead of the Hx and findings and plan reported above, or in particular, I note as follows:    On confrontation testing the RUE is weak compared with the LUE (biceps, triceps, finger flexors).  I can "break" the R triceps when he locks the elbow in extension (this IS NOT NORMAL and is an absolute indicator of weakness in  healthy young and middle-aged adults).  Harshal in the UEs are normal.  No UE drift.  On sensory examination both UEs are dysesthetic, the lower R abdominal wall (tested only anteriorly) is dysesthetic, and both feet are dysesthetic (worse on the L).      Any passive movement of the LEs results in SEVERE involuntary spasms.  Is is NOT POSSIBLE to perform confrontation testing of LE strength due to the spasms.      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 GBS initially diagnosed 2018, however LP at that time was negative. Furthermore, patient repeatedly presenting w/ similar symptoms, w/ repeated negative spinal cord MRIs and lack of areflexia/hyporeflexia. It is reasonable to question patient's initial diagnosis of GBS. Rather, patient's clinical history is more suggestive of vacuolar myelopathy (i.e., AIDS/HIV myelopathy), which usually manifests late in the course of HIV infection, with slowly progressive weakness of LE, gait disorder, sparing of UEs, and normal MRIs. Pertinent labs that were wnl from 9/2021: B12 (396), MMA (147), homocysteine (7.7), TSH, CRP, ESR, copper, zinc, ACHM binding ab, ACH modulating ab, ACH binding ab, MuSK, and SHAI. Last recorded LP cell count 5/2021: glucose wnl, protein wnl, 0 cell count. It is worth noting that there is a lack of evidence showing a relationship between the presence of HIV in CSF and the development of myelopathy. Generally, it is believed that aggressive antiretroviral therapy can lead to improvement of symptoms.    Impression: BLE weakness (L>R) a/w urinary retention w/ BLE hyperreflexia w/ h/o normal LP, normal spine MRIs, i/s/o congenital HIV. 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 Less likely GBS.     Plan:  [] HIV viral load, T cell subset  [] Consider ID consult  [] CBC w/ diff, CMP, ESR, glucose, A1c, TSH, T4, B12, MMA, homocysteine  [] Will discuss utility of repeat MR imaging and LP with Neurology Attending in the AM   [] F/u CT imaging  [] NIFs and VC q4h -- f/u w/ Neurology 1/27/22 regarding discontinuation (i.e., if Attending agrees less likely GBS, can d/c)  [] Q4H neurological checks  [] Daily I/Os   [] Fall precautions  [] PT/OT consult  [] SW/CM consult - patient w/ questions regarding long term placement    * Case and plan to be discussed with Neurology Attending Dr. Wilks *      NEUROLOGY ATTENDING ADDENDUM    I personally interviewed, examined, and participated in the care of this patient, on rounds 1/27/22 with the neurology consult service team.  Reviewed findings and management plan with the team.  In addition to or instead of the Hx and findings and plan reported above, or in particular, I note as follows:    THIS PATIENT WAS EXAMINED AND EVALUATED BY ME DURING A PREVIOUS [facility] ADMISSION.  HE DOES NOT HAVE AND DID NOT HAVE GBS!!!!!  The DIAGNOSIS OF GBS MADE AT [place] WAS, IN RETROSPECT,  INCORRECT.  SEE THE NEUROLOGY CONSULT NOTE DATED 9/10/21 WHICH CONTAINS MY OBSERVATIONS AND ASSESSMENT FROM THAT PREVIOUS HOSPITALIZATION.      On confrontation testing the RUE is weak compared with the LUE (biceps, triceps, finger flexors).  I can "break" the R triceps when he locks the elbow in extension (this IS NOT NORMAL and is an absolute indicator of weakness in  healthy young and middle-aged adults).  Harshal in the UEs are normal.  No UE drift.  On sensory examination both UEs are dysesthetic, the lower R abdominal wall (tested only anteriorly) is dysesthetic, and both feet are dysesthetic (worse on the L).      Any passive movement of the LEs results in SEVERE involuntary spasms.  Is is NOT POSSIBLE to perform confrontation testing of LE strength due to the spasms.      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  The principal manifestations are paraparesis, worsening, with severe uncontrolled spasticity.  He also has UE weakness (not as severe as LE weakness) and UE hyperreflexia (but not UE disabling involuntary spasms).   The clinical course since we last saw him in September 2021 has made elimination of a Dx pf GBS definitive.  I have deleted the GBS Dx from the the EMR.  I agree with the above recommendations.  Also see the Neurology Resident Chart Note of later today.

## 2022-01-27 NOTE — H&P ADULT - PROBLEM SELECTOR PLAN 3
Patient with hypokalemia to 3.1. unclear etiology, no medications that can cause. Potentially decrease PO intake given he is not domiciled  - s/p 40 potassium chloride  - Repeat BMP in AM  - Replete K<3.5 Patient with hypokalemia to 3.1. unclear etiology, no medications that can cause. Potentially decrease PO intake given he is not domiciled  - s/p 40 potassium chloride. Will give another 40 x2   - Repeat BMP in AM  - Replete K<3.5

## 2022-01-27 NOTE — ED PROVIDER NOTE - PHYSICAL EXAMINATION
Const: Well-nourished, Well-developed, appearing stated age.  Eyes: no conjunctival injection, and symmetrical lids.  HEENT: Head NCAT, no lesions. Atraumatic external nose and ears. Moist MM.  Neck: Symmetric, trachea midline.   CVS: +S1/S2,   RESP: Unlabored respiratory effort. Clear to auscultation bilaterally.  GI: Nontender/Nondistended,   MSK: Normocephalic/Atraumatic, Lower Extremities w/o calf tenderness or edema b/l.   Skin: Warm, dry and intact.   Neuro: Sensation grossly intact. Pt unable to lift bilateral legs.   Psych: Awake, Alert, & Oriented (AAO) x3. Appropriate mood and affect.

## 2022-01-27 NOTE — CONSULT NOTE ADULT - SUBJECTIVE AND OBJECTIVE BOX
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 Patient states that he was diagnosed with GBS in 2018 at [place] after he presented there for bilateral LE weakness; although patient had an LP at that time and was told the results were negative. Since then, pt has had recurrent episodes of worsening BLE weakness with intermittent improvement between these episodes.   Patient states that there are days he is able to walk unassisted, but otherwise uses a cane. Patient states that he has been having worsening recurrence of weakness with urinary rentention prompting him to come ED for evaluation. Of note, patient has been seen in the hospital multiple times, most recently in 9/2021. Patient has had repeated MR cervical/thoracic/lumbar spine in the past without any significant findings. Patient was seen by [facility] Neurology (Dr. Wilks) in 9/2021. It was assessed that his history and exam were inconsistent with GBS and more consistent with HIV myelopathy.  He was evaluated by ID during admission in 6/21 for LE weakensswhen he was noted to be Uncontrolled HIV, undomiciled, does not follow with an HIV specialist at this time. Off ARTs for about two months. Receives his ARTs when discharged from hospital setting. Has no cell phone. He had HCV weakly reactive, Hep C RNA PCR negative, CSF analysis was negative for infection, lyme serum and csf negative and west nile virus serology negative, quant gold negative. His prior drug screens have been consistently positive for Cocaine, THC and opiates  At this time, Patient continues to report LE weakness. He is not able to walk to the bathroom. He denies any recent fevers, chills, HA, blurry vision, SOB, CP, n/v/d, or any other symptoms. In the ED, VSS. Labs notable for hypokalemia s/p potassium repletion. Patient admitted to medicine for further neurological management and ID asked to comment on possiblilty for HIV myelopathy    REVIEW OF SYSTEMS  [  ] ROS unobtainable because:    [ x ] All other systems negative except as noted below    Constitutional:  [ ] fever [ ] chills  [ ] weight loss  [ ]night sweat  [ ]poor appetite/PO intake [ ]fatigue   Skin:  [ ] rash [ ] phlebitis	  Eyes: [ ] icterus [ ] pain  [ ] discharge	  ENMT: [ ] sore throat  [ ] thrush [ ] ulcers [ ] exudates [ ]anosmia  Respiratory: [ ] dyspnea [ ] hemoptysis [ ] cough [ ] sputum	  Cardiovascular:  [ ] chest pain [ ] palpitations [ ] edema	  Gastrointestinal:  [ ] nausea [ ] vomiting [ ] diarrhea [ ] constipation [ ] pain	  Genitourinary:  [ ] dysuria [ ] frequency [ ] hematuria [ ] discharge [ ] flank pain  [ ] incontinence  Musculoskeletal:  [ ] myalgias [ ] arthralgias [ ] arthritis  [ ] back pain  Neurological:  [ ] headache [ ] weakness [ ] seizures  [ ] confusion/altered mental status    prior hospital charts reviewed [V]  primary team notes reviewed [V]  other consultant notes reviewed [V]    PAST MEDICAL & SURGICAL HISTORY:  HIV (human immunodeficiency virus infection) from birth  Asthma  CVA (cerebral vascular accident)  Closed fracture of multiple ribs of right side, initial encounter  Cocaine abuse  Chronic sinusitis  Homeless  GBS (Guillain [place] syndrome)  History of orthopedic surgery left arm    SOCIAL HISTORY:  - Denied smoking/vaping/alcohol/recreational drug use    FAMILY HISTORY:  FH: HIV infection (Mother)        Allergies  Ceclor (Unknown)  Toradol (Anaphylaxis; Swelling)  Toradol (Swelling)        ANTIMICROBIALS:  bictegravir 50 mG/emtricitabine 200 mG/tenofovir alafenamide 25 mG (BIKTARVY) 1 daily      ANTIMICROBIALS (past 90 days):  MEDICATIONS  (STANDING):        OTHER MEDS:   MEDICATIONS  (STANDING):  acetaminophen     Tablet .. 650 every 6 hours PRN  aluminum hydroxide/magnesium hydroxide/simethicone Suspension 30 every 4 hours PRN  gabapentin 300 three times a day  melatonin 3 at bedtime PRN  QUEtiapine 300 at bedtime      VITALS:  Vital Signs Last 24 Hrs  T(F): 97.6 (01-27-22 @ 11:49), Max: 98 (01-25-22 @ 20:14)    Vital Signs Last 24 Hrs  HR: 77 (01-27-22 @ 11:49) (77 - 85)  BP: 131/92 (01-27-22 @ 11:49) (127/97 - 147/91)  RR: 17 (01-27-22 @ 11:49)  SpO2: 100% (01-27-22 @ 11:49) (98% - 100%)  Wt(kg): --    EXAM:    GA: NAD, AOx3  HEENT: oral cavity no lesion  CV: nl S1/S2, no RMG  Lungs: CTAB, No distress  Abd: BS+, soft, nontender, no rebounding pain  Ext: no edema  Neuro: No focal deficits  Skin: Intact  IV: no phlebitis    Labs:                        14.3   6.19  )-----------( 252      ( 27 Jan 2022 06:31 )             45.8     01-27    137  |  96<L>  |  15  ----------------------------<  68<L>  3.1<L>   |  24  |  0.97    Ca    9.0      27 Jan 2022 06:31    TPro  7.8  /  Alb  4.5  /  TBili  1.2  /  DBili  x   /  AST  36  /  ALT  22  /  AlkPhos  87  01-27      WBC Trend:  WBC Count: 6.19 (01-27-22 @ 06:31)      Auto Neutrophil #: 0.93 K/uL (09-14-21 @ 07:52)  Auto Neutrophil #: 1.39 K/uL (09-13-21 @ 13:37)  Auto Neutrophil #: 1.25 K/uL (09-09-21 @ 19:28)  Auto Neutrophil #: 1.05 K/uL (06-07-21 @ 07:24)  Auto Neutrophil #: 1.10 K/uL (06-06-21 @ 07:26)      Creatine Trend:  Creatinine, Serum: 0.97 (01-27)      Liver Biochemical Testing Trend:  Alanine Aminotransferase (ALT/SGPT): 22 (01-27)  Alanine Aminotransferase (ALT/SGPT): 15 (09-14)  Alanine Aminotransferase (ALT/SGPT): 20 (09-09)  Alanine Aminotransferase (ALT/SGPT): 10 (06-07)  Alanine Aminotransferase (ALT/SGPT): 7 (06-06)  Aspartate Aminotransferase (AST/SGOT): 36 (01-27-22 @ 06:31)  Aspartate Aminotransferase (AST/SGOT): 19 (09-14-21 @ 07:52)  Aspartate Aminotransferase (AST/SGOT): 51 (09-09-21 @ 19:28)  Aspartate Aminotransferase (AST/SGOT): 16 (06-07-21 @ 07:24)  Aspartate Aminotransferase (AST/SGOT): 16 (06-06-21 @ 07:26)  Bilirubin Total, Serum: 1.2 (01-27)  Bilirubin Total, Serum: 0.4 (09-14)  Bilirubin Total, Serum: 0.6 (09-09)  Bilirubin Total, Serum: 0.2 (06-07)  Bilirubin Total, Serum: 0.3 (06-06)      Trend LDH              MICROBIOLOGY:        Culture - Blood (collected 05 Jun 2021 15:02)  Source: .Blood Blood-Venous  Final Report:    No Growth Final    Culture - Blood (collected 05 Jun 2021 15:00)  Source: .Blood Blood-Peripheral  Final Report:    No Growth Final    Culture - Blood (collected 20 May 2021 18:01)  Source: .Blood Blood  Final Report:    No growth at 5 days.    Culture - Blood (collected 20 May 2021 17:59)  Source: .Blood Blood  Final Report:    No growth at 5 days.    Culture - Fungal, CSF (collected 02 May 2021 15:52)  Source: .CSF  Final Report:    No fungus isolated after 4 weeks.    Culture - CSF with Gram Stain (collected 02 May 2021 15:52)  Source: .CSF tube#2  Final Report:    No growth    Culture - Blood (collected 02 May 2021 06:03)  Source: .Blood None  Final Report:    No Growth Final    Culture - Blood (collected 02 May 2021 06:03)  Source: .Blood None  Final Report:    No Growth Final    Culture - Urine (collected 26 Oct 2020 05:28)  Source: .Urine Catheterized  Final Report:    <10,000 CFU/mL Normal Urogenital Jeimy    Culture - Blood (collected 25 Oct 2020 21:46)  Source: .Blood Blood  Final Report:    No Growth Final        ABS CD4: 322 /uL (09-13-21 @ 18:23)  ABS CD4: 340 /uL (09-13-21 @ 14:06)  ABS CD4: 318 /uL (10-26-20 @ 17:29)  ABS CD4: 144 /uL (10-13-20 @ 07:14)    HIV-1 Viral Load Result: DET. (06-05-21 @ 20:10)  HIV-1 RNA Quantitative, Viral Load: 7,525 (06-05-21 @ 20:10)  HIV-1 Viral Load Result: DET. (05-02-21 @ 06:03)  HIV-1 RNA Quantitative, Viral Load: 37,671 (05-02-21 @ 06:03)  HIV-1 RNA Quantitative, Viral Load: 75,567 (10-25-20 @ 21:28)                    COVID-19 [place] AB Interp: Positive (09-10-21 @ 13:19)  COVID-19 [place] AB Interp: Positive (06-05-21 @ 21:13)  COVID-19 [place] AB Interp: Positive (05-21-21 @ 09:55)                      A1C with Estimated Average Glucose Result: 4.8 % (09-10-21 @ 09:21)    Sedimentation Rate, Erythrocyte: 9 mm/hr (09-12-21 @ 07:01)  Sedimentation Rate, Erythrocyte: 25 mm/hr (06-05-21 @ 15:23)  Sedimentation Rate, Erythrocyte: 28 mm/hr (05-20-21 @ 18:02)  Sedimentation Rate, Erythrocyte: 28 mm/hr (05-02-21 @ 17:05)    CSF:    CSF PCR Result: NotDetec (05-02-21 @ 15:52)  CSF Segmented Neutrophils: Not Done (05-02-21 @ 15:52)  Total Nucleated Cell Count, CSF: 0 /uL (05-02-21 @ 15:52)  RBC Count - Spinal Fluid: 26 /uL (05-02-21 @ 15:52)  CSF Appearance: Clear (05-02-21 @ 15:52)  RBC Count - Spinal Fluid: 0 /uL (05-02-21 @ 15:52)  CSF Appearance: Clear (05-02-21 @ 15:52)  CSF Segmented Neutrophils: Not Done (05-02-21 @ 15:52)  Total Nucleated Cell Count, CSF: 0 /uL (05-02-21 @ 15:52)  Glucose, CSF: 48 mg/dL (05-02-21 @ 15:52)  Protein, CSF: 45 mg/dL (05-02-21 @ 15:52)  VDRL Titer, CSF: Nonreact (05-02-21 @ 15:52)                RADIOLOGY:  imaging below personally reviewed                       Patient is a 31y old  Male who presents with a chief complaint of LE weakness (27 Jan 2022 11:29)    HPI:  is a 31 year old male w/ 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  Pt reports his weakness is all in B/L LE Lt>Rt. It started in december 2017 and is episodic. He reports he gets weak for a few weeks and is unable to ambulate. It is associated with significant allodynia and paresthesia as per pt. He reports improvement after a few weeks with PT each times.  Patient states that he was diagnosed with GBS in 2018 at [place] after he presented there for bilateral LE weakness; although patient had an LP at that time and was told the results were negative. Since then, pt has had recurrent episodes of worsening BLE weakness with intermittent improvement between these episodes. Patient states that there are days he is able to walk unassisted, but otherwise uses a cane.  Patient has been seen in the hospital multiple times, most recently in 9/2021. Patient has had repeated MR cervical/thoracic/lumbar spine in the past without any significant findings. Patient was seen by [facility]rology (Dr. Wilks) in 9/2021. It was assessed that his history and exam were inconsistent with GBS and more consistent with HIV myelopathy.  He was evaluated by ID during admission in 6/21 for LE weakenss when he was noted to be Uncontrolled HIV, undomiciled, does not follow with an HIV specialist at this time and Off ARTs for about two months. He had HCV weakly reactive, Hep C RNA PCR negative, CSF analysis was negative for infection, lyme serum and csf negative and west nile virus serology negative, quant gold negative. His prior drug screens have been consistently positive for Cocaine, THC and opiates, however he only endorses to use of THC.  He was started on biktarvy at that visit, however hasnt followed up with an ID provider since then. Pt has one female sexual partner, uses condoms consistently. No hx of any oppurtunistic infections or other STIs as per pt. He does note that a few years back his CD4 count was below 200    Patient states that he has been having worsening recurrence of weakness with some difficulty initiating urine stream prompting him to come ED for evaluation. At this time, Patient continues to report LE weakness. He is not able to walk to the bathroom. He denies any recent fevers, chills, HA, blurry vision, SOB, CP, n/v/d, or any other symptoms. He reports compliance with ART and reports he doesnt remember CD4 count from last month, but his HIV VL was negative last month. Patient admitted to medicine for further neurological management and ID asked to comment on possiblilty for HIV myelopathy.    REVIEW OF SYSTEMS  [  ] ROS unobtainable because:    [ x ] All other systems negative except as noted below    Constitutional:  [ ] fever [ ] chills  [ ] weight loss  [ ]night sweat  [ ]poor appetite/PO intake [ ]fatigue   Skin:  [ ] rash [ ] phlebitis	  Eyes: [ ] icterus [ ] pain  [ ] discharge	  ENMT: [ ] sore throat  [ ] thrush [ ] ulcers [ ] exudates [ ]anosmia  Respiratory: [ ] dyspnea [ ] hemoptysis [ ] cough [ ] sputum	  Cardiovascular:  [ ] chest pain [ ] palpitations [ ] edema	  Gastrointestinal:  [ ] nausea [ ] vomiting [ ] diarrhea [ ] constipation [ ] pain	  Genitourinary:  [ ] dysuria [ ] frequency [ ] hematuria [ ] discharge [ ] flank pain  [ ] incontinence  Musculoskeletal:  [ ] myalgias [ ] arthralgias [ ] arthritis  [ ] back pain  Neurological:  [ ] headache [ ] weakness [ ] seizures  [ ] confusion/altered mental status    prior hospital charts reviewed [V]  primary team notes reviewed [V]  other consultant notes reviewed [V]    PAST MEDICAL & SURGICAL HISTORY:  HIV (human immunodeficiency virus infection) from birth  Asthma  CVA (cerebral vascular accident)  Closed fracture of multiple ribs of right side, initial encounter  Cocaine abuse  Chronic sinusitis  Homeless  GBS (Guillain [place] syndrome)  History of orthopedic surgery left arm    SOCIAL HISTORY:  - Reportedly lives at home by himself.   - Smokes 1 pack of kunal in 2 days: Quit last week  - Uses marijuanna  - Occasional alcohol  - Denied IV drugs use or anyother drug use    FAMILY HISTORY:  FH: HIV infection (Mother)    Allergies  Ceclor (Unknown)  Toradol (Anaphylaxis; Swelling)  Toradol (Swelling)        ANTIMICROBIALS:  bictegravir 50 mG/emtricitabine 200 mG/tenofovir alafenamide 25 mG (BIKTARVY) 1 daily      ANTIMICROBIALS (past 90 days):  MEDICATIONS  (STANDING):        OTHER MEDS:   MEDICATIONS  (STANDING):  acetaminophen     Tablet .. 650 every 6 hours PRN  aluminum hydroxide/magnesium hydroxide/simethicone Suspension 30 every 4 hours PRN  gabapentin 300 three times a day  melatonin 3 at bedtime PRN  QUEtiapine 300 at bedtime      VITALS:  Vital Signs Last 24 Hrs  T(F): 97.6 (01-27-22 @ 11:49), Max: 98 (01-25-22 @ 20:14)    Vital Signs Last 24 Hrs  HR: 77 (01-27-22 @ 11:49) (77 - 85)  BP: 131/92 (01-27-22 @ 11:49) (127/97 - 147/91)  RR: 17 (01-27-22 @ 11:49)  SpO2: 100% (01-27-22 @ 11:49) (98% - 100%)  Wt(kg): --    EXAM:    PHYSICAL EXAM:  General:  non-toxic, AOx3  HEAD/EYES: PERRL, white sclera   ENT:  normal, No thrush and no pharyngeal exudate  Neck: Supple  Cardiovascular:   No murmur,  normal S1 and S2  Respiratory: clear to ausculation bilaterally  GI:  soft, non-tender, normal bowel sounds  : no gonzales, no CVA tenderness   Musculoskeletal:  no synovitis  Neurologic: BLE flaccid paralysis (left worse than right) with allodynia and paresthesia  Skin: no rash  Lymph:  no lymphadenopathy  Psychiatric: Cooperative, appropriate affect, alert & oriented  Lines:  no phlebitis         Labs:                        14.3   6.19  )-----------( 252      ( 27 Jan 2022 06:31 )             45.8     01-27    137  |  96<L>  |  15  ----------------------------<  68<L>  3.1<L>   |  24  |  0.97    Ca    9.0      27 Jan 2022 06:31    TPro  7.8  /  Alb  4.5  /  TBili  1.2  /  DBili  x   /  AST  36  /  ALT  22  /  AlkPhos  87  01-27      WBC Trend:  WBC Count: 6.19 (01-27-22 @ 06:31)      Auto Neutrophil #: 0.93 K/uL (09-14-21 @ 07:52)  Auto Neutrophil #: 1.39 K/uL (09-13-21 @ 13:37)  Auto Neutrophil #: 1.25 K/uL (09-09-21 @ 19:28)  Auto Neutrophil #: 1.05 K/uL (06-07-21 @ 07:24)  Auto Neutrophil #: 1.10 K/uL (06-06-21 @ 07:26)      Creatine Trend:  Creatinine, Serum: 0.97 (01-27)      Liver Biochemical Testing Trend:  Alanine Aminotransferase (ALT/SGPT): 22 (01-27)  Alanine Aminotransferase (ALT/SGPT): 15 (09-14)  Alanine Aminotransferase (ALT/SGPT): 20 (09-09)  Alanine Aminotransferase (ALT/SGPT): 10 (06-07)  Alanine Aminotransferase (ALT/SGPT): 7 (06-06)  Aspartate Aminotransferase (AST/SGOT): 36 (01-27-22 @ 06:31)  Aspartate Aminotransferase (AST/SGOT): 19 (09-14-21 @ 07:52)  Aspartate Aminotransferase (AST/SGOT): 51 (09-09-21 @ 19:28)  Aspartate Aminotransferase (AST/SGOT): 16 (06-07-21 @ 07:24)  Aspartate Aminotransferase (AST/SGOT): 16 (06-06-21 @ 07:26)  Bilirubin Total, Serum: 1.2 (01-27)  Bilirubin Total, Serum: 0.4 (09-14)  Bilirubin Total, Serum: 0.6 (09-09)  Bilirubin Total, Serum: 0.2 (06-07)  Bilirubin Total, Serum: 0.3 (06-06)      Trend LDH              MICROBIOLOGY:        Culture - Blood (collected 05 Jun 2021 15:02)  Source: .Blood Blood-Venous  Final Report:    No Growth Final    Culture - Blood (collected 05 Jun 2021 15:00)  Source: .Blood Blood-Peripheral  Final Report:    No Growth Final    Culture - Blood (collected 20 May 2021 18:01)  Source: .Blood Blood  Final Report:    No growth at 5 days.    Culture - Blood (collected 20 May 2021 17:59)  Source: .Blood Blood  Final Report:    No growth at 5 days.    Culture - Fungal, CSF (collected 02 May 2021 15:52)  Source: .CSF  Final Report:    No fungus isolated after 4 weeks.    Culture - CSF with Gram Stain (collected 02 May 2021 15:52)  Source: .CSF tube#2  Final Report:    No growth    Culture - Blood (collected 02 May 2021 06:03)  Source: .Blood None  Final Report:    No Growth Final    Culture - Blood (collected 02 May 2021 06:03)  Source: .Blood None  Final Report:    No Growth Final    Culture - Urine (collected 26 Oct 2020 05:28)  Source: .Urine Catheterized  Final Report:    <10,000 CFU/mL Normal Urogenital Jeimy    Culture - Blood (collected 25 Oct 2020 21:46)  Source: .Blood Blood  Final Report:    No Growth Final        ABS CD4: 322 /uL (09-13-21 @ 18:23)  ABS CD4: 340 /uL (09-13-21 @ 14:06)  ABS CD4: 318 /uL (10-26-20 @ 17:29)  ABS CD4: 144 /uL (10-13-20 @ 07:14)    HIV-1 Viral Load Result: DET. (06-05-21 @ 20:10)  HIV-1 RNA Quantitative, Viral Load: 7,525 (06-05-21 @ 20:10)  HIV-1 Viral Load Result: DET. (05-02-21 @ 06:03)  HIV-1 RNA Quantitative, Viral Load: 37,671 (05-02-21 @ 06:03)  HIV-1 RNA Quantitative, Viral Load: 75,567 (10-25-20 @ 21:28)                    COVID-19 [place] AB Interp: Positive (09-10-21 @ 13:19)  COVID-19 [place] AB Interp: Positive (06-05-21 @ 21:13)  COVID-19 [place] AB Interp: Positive (05-21-21 @ 09:55)                      A1C with Estimated Average Glucose Result: 4.8 % (09-10-21 @ 09:21)    Sedimentation Rate, Erythrocyte: 9 mm/hr (09-12-21 @ 07:01)  Sedimentation Rate, Erythrocyte: 25 mm/hr (06-05-21 @ 15:23)  Sedimentation Rate, Erythrocyte: 28 mm/hr (05-20-21 @ 18:02)  Sedimentation Rate, Erythrocyte: 28 mm/hr (05-02-21 @ 17:05)    CSF:    CSF PCR Result: NotDetec (05-02-21 @ 15:52)  CSF Segmented Neutrophils: Not Done (05-02-21 @ 15:52)  Total Nucleated Cell Count, CSF: 0 /uL (05-02-21 @ 15:52)  RBC Count - Spinal Fluid: 26 /uL (05-02-21 @ 15:52)  CSF Appearance: Clear (05-02-21 @ 15:52)  RBC Count - Spinal Fluid: 0 /uL (05-02-21 @ 15:52)  CSF Appearance: Clear (05-02-21 @ 15:52)  CSF Segmented Neutrophils: Not Done (05-02-21 @ 15:52)  Total Nucleated Cell Count, CSF: 0 /uL (05-02-21 @ 15:52)  Glucose, CSF: 48 mg/dL (05-02-21 @ 15:52)  Protein, CSF: 45 mg/dL (05-02-21 @ 15:52)  VDRL Titer, CSF: Nonreact (05-02-21 @ 15:52)                RADIOLOGY:  imaging below personally reviewed

## 2022-01-27 NOTE — H&P ADULT - PROBLEM SELECTOR PLAN 4
Reported hx of GBS at [facility] in 2018. However, history and work-up inconsistent. On home baclofen and gabapentin, reportedly helps  - Can restart gabapentin, will speak with neuro

## 2022-01-27 NOTE — H&P ADULT - PROBLEM SELECTOR PLAN 5
DVT: lovenox  Diet: Regular  Dispo: unclear, pending clinical improvement. Patient is homeless, needs a long term placement

## 2022-01-27 NOTE — ED ADULT NURSE NOTE - OBJECTIVE STATEMENT
Pt presents to room 10, alert&orientedx4, ambulatory with cane at baseline, pmhx GBS, +HIV, arrived from home by EMS coming to ED with c/o lower back pain, radiating to b/l lower extremities, states "it hurts to move". Sensory intact, +ROM to B/L LE. Symptoms started this morning. Pt also endorses chest pain, but denies any n/v/d, fever or SOB. MD at bedside for eval. NSR on tele. Pending orders.

## 2022-01-27 NOTE — ED PROVIDER NOTE - NSICDXPASTMEDICALHX_GEN_ALL_CORE_FT
PAST MEDICAL HISTORY:  Asthma     Chronic sinusitis     Closed fracture of multiple ribs of right side, initial encounter     Cocaine abuse     CVA (cerebral vascular accident)     GBS (Guillain [place] syndrome)     HIV (human immunodeficiency virus infection) from birth    Homeless

## 2022-01-27 NOTE — H&P ADULT - ATTENDING COMMENTS
31 M h/o HIV on ART, reported h/o GBS p/w worsening leg weakness. Neuro and ID following. Concern for potential HIV myelopathy. Awaiting MR C-spine and T-spine; neuro and ID recs reviewed.

## 2022-01-27 NOTE — H&P ADULT - ASSESSMENT
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

## 2022-01-27 NOTE — CONSULT NOTE ADULT - ATTENDING COMMENTS
31 year old with congential HIV with recurrent episodes of LE weakness with change in sensation  Pror work up without cause    1) HIV  continue biktarvy   Check CD4 and viral load    HIV myelopathy seen more often in end stage disease.  Would be less common at his state of disease    2) Concern for myelopathy  Follow up repeat imaging  neuro eval in progress

## 2022-01-27 NOTE — ED ADULT NURSE REASSESSMENT NOTE - NS ED NURSE REASSESS COMMENT FT1
Pt awake, alert and oriented x 4 c/o BLE pain and lower back pain partially relieved with some repositioning.  Respirations even and unlabored.    Denies chest pain or SOB.   VSS.    IV site unremarkable.   Denies headache, dizziness, n/v/d.   Remains nonambulatory.    Awaiting inpatient bed.

## 2022-01-27 NOTE — H&P ADULT - HISTORY OF PRESENT ILLNESS
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 Patient states that he was diagnosed with GBS in 2018 at [place] after he presented there for bilateral LE weakness; although patient had an LP at that time and was told the results were negative. Since then, pt has had recurrent episodes of worsening BLE weakness with intermittent improvement between these episodes.     Patient states that there are days he is able to walk unassisted, but otherwise uses a cane. 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 Patient states that he was diagnosed with GBS in 2018 at [place] after he presented there for bilateral LE weakness; although patient had an LP at that time and was told the results were negative. Since then, pt has had recurrent episodes of worsening BLE weakness with intermittent improvement between these episodes.     Patient states that there are days he is able to walk unassisted, but otherwise uses a cane. Patient states that he has been having worsening recurrence of weakness with urinary rentention prompting him to come ED for evaluation. Of note, patient has been seen in the hospital multiple times, most recently in 9/2021. Patient has had repeated MR cervical/thoracic/lumbar spine in the past without any significant findings. Patient was seen by [facility] (Dr. Wilks) in 9/2021. It was assessed that his history and exam were inconsistent with GBS and more consistent with HIV myelopathy.    At this time, Patient continues to report LE weakness. He is not able to walk to the bathroom. He denies any recent fevers, chills, HA, blurry vision, SOB, CP, n/v/d, or any other symptoms     In the ED, VSS. Labs notable for hypokalemia s/p potassium repletion. 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 Patient also reports 7/10 low back and thigh and calf pain; describes it as sharp and burning at time. Patient states that he was diagnosed with GBS in 2018 at [place] after he presented there for bilateral LE weakness; although patient had an LP at that time and was told the results were negative. Since then, pt has had recurrent episodes of worsening BLE weakness with intermittent improvement between these episodes.     Patient states that there are days he is able to walk unassisted, but otherwise uses a cane. Patient states that he has been having worsening recurrence of weakness with urinary rentention prompting him to come ED for evaluation. Of note, patient has been seen in the hospital multiple times, most recently in 9/2021. Patient has had repeated MR cervical/thoracic/lumbar spine in the past without any significant findings. Patient was seen by [facility] (Dr. Wilks) in 9/2021. It was assessed that his history and exam were inconsistent with GBS and more consistent with HIV myelopathy.    At this time, Patient continues to report LE weakness. He is not able to walk to the bathroom. He was able to urinate recently. He denies any recent fevers, chills, HA, blurry vision, SOB, CP, n/v/d, or any other symptoms     In the ED, VSS. Labs notable for hypokalemia s/p potassium repletion. Patient admitted to medicine for further neurological management

## 2022-01-27 NOTE — H&P ADULT - NSHPREVIEWOFSYSTEMS_GEN_ALL_CORE
CONSTITUTIONAL: No fever, no chills, no weight loss  EYES: No eye pain, no visual disturbance  RESPIRATORY: No cough, No SOB  CARDIOVASCULAR: No CP, no palpitations  GASTROINTESTINAL: no abdominal pain, no n/v/d  GENITOURINARY: No dysuria, no hematuria  HEME: No easy bruising, no bleeding gums  NEURO: No headaches, + weakness  SKIN: No itching, no rashes  MSK: No joint pain, no joint swelling Review of Systems:   CONSTITUTIONAL: No fever, weight loss, or fatigue  EYES: No eye pain, visual disturbances, or discharge  ENMT:  No difficulty hearing, tinnitus, vertigo; No sinus or throat pain  NECK: No pain or stiffness  BREASTS: No pain, masses, or nipple discharge  RESPIRATORY: No cough, wheezing, chills or hemoptysis; No shortness of breath  CARDIOVASCULAR: No chest pain, palpitations, dizziness, or leg swelling  GASTROINTESTINAL: No abdominal or epigastric pain. No nausea, vomiting, or hematemesis; No diarrhea or constipation. No melena or hematochezia.  GENITOURINARY: No dysuria, frequency, hematuria, or incontinence  NEUROLOGICAL: No headaches, memory loss, +loss of strength, no numbness, or tremors  SKIN: No itching, burning, rashes, or lesions   LYMPH NODES: No enlarged glands  ENDOCRINE: No heat or cold intolerance; No hair loss  MUSCULOSKELETAL: No joint pain or swelling; No muscle, back, or extremity pain  PSYCHIATRIC: No depression, anxiety, mood swings, or difficulty sleeping  HEME/LYMPH: No easy bruising, or bleeding gums  ALLERGY AND IMMUNOLOGIC: No hives or eczema

## 2022-01-27 NOTE — ED PROVIDER NOTE - ATTENDING CONTRIBUTION TO CARE
30 yo with HIV and GBS presenting with mild constant nonexertional non radiating CP for the last 2 days.  However complicating his visit today he states he has not been able to walk or get up from bed which is consistent with a GBS flair.      Gen: Well appearing in NAD  Head: NC/AT  Neck: trachea midline  Resp:  No distress  Ext: no deformities  Neuro:  A&O BLE weak  Skin:  Warm and dry as visualized  Psych:  Normal affect and mood     30 yo with CP and weakness. With regards to the CP it is atypical for an ACS.  Will get a trop with a reassuring eKG.  However patient is unable to ambulate.  Might need steroids or IVIG will get neuro c/s and admit to medicine.

## 2022-01-27 NOTE — H&P ADULT - NSHPSOCIALHISTORY_GEN_ALL_CORE
Lives:  Works:  Tobacco:  Alcohol:  Drugs: Lives: at a shelter in [place]  Tobacco: quit smoking 1 week ago. smoked 0.25ppd for 14 years  Alcohol: denies  Drugs: Uses marijuana once a week and cocaine occasionally. Denies IV drug use  Sexual activity: Denies any recent sexual activity or history of STI

## 2022-01-27 NOTE — CONSULT NOTE ADULT - ASSESSMENT
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 ED complaining of worsening of baseline BLE weakness (left worse than right) and urinary retention. Patient states that he was diagnosed with GBS in 2018 at [place] after he presented there for bilateral LE weakness; although patient had an LP at that time and was told the results were negative. Since then, pt has had recurrent episodes of worsening BLE weakness with intermittent improvement between these episodes.   Patient states that there are days he is able to walk unassisted, but otherwise uses a cane. Patient states that he has been having worsening recurrence of weakness with urinary rentention prompting him to come ED for evaluation. Of note, patient has been seen in the hospital multiple times, most recently in 9/2021. Patient has had repeated MR cervical/thoracic/lumbar spine in the past without any significant findings. Patient was seen by [facility] (Dr. Wilks) in 9/2021. It was assessed that his history and exam were inconsistent with GBS and more consistent with HIV myelopathy.  He was evaluated by ID during admission in 6/21 for LE weakensswhen he was noted to be Uncontrolled HIV, undomiciled, does not follow with an HIV specialist at this time. Off ARTs for about two months. Receives his ARTs when discharged from hospital setting. Has no cell phone. He had HCV weakly reactive, Hep C RNA PCR negative, CSF analysis was negative for infection, lyme serum and csf negative and west nile virus serology negative, quant gold negative. His prior drug screens have been consistently positive for Cocaine, THC and opiates  At this time, Patient continues to report LE weakness. He is not able to walk to the bathroom. He denies any recent fevers, chills, HA, blurry vision, SOB, CP, n/v/d, or any other symptoms. In the ED, VSS. Labs notable for hypokalemia s/p potassium repletion. Patient admitted to medicine for further neurological management and ID asked to comment on possiblilty for HIV myelopathy    HIV VL: 7,525 (06-05-21)   CD4: 322 /uL (09-13/21)      IMPRESSION  ·	HIV   ·	Paraperesis concerned for GBS vs HIV myelopathy    RECOMMENDATION  BLE weakness (left worse than right) and urinary retention  Here on biktarvy  Please send CD4 and T cell subsetw    Pt to be seen    Kahlil Montoya MD, PGY4   ID fellow  [employer] Teams Preferred/ Pager: [phone]  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  Pt reports his weakness is all in B/L LE Lt>Rt. It started in december 2017 and is episodic. He reports he gets weak for a few weeks and is unable to ambulate. It is associated with significant allodynia and paresthesia as per pt. He reports improvement after a few weeks with PT each times.  Patient states that he was diagnosed with GBS in 2018 at [place] after he presented there for bilateral LE weakness; although patient had an LP at that time and was told the results were negative. Since then, pt has had recurrent episodes of worsening BLE weakness with intermittent improvement between these episodes. Patient states that there are days he is able to walk unassisted, but otherwise uses a cane.  Patient has been seen in the hospital multiple times, most recently in 9/2021. Patient has had repeated MR cervical/thoracic/lumbar spine in the past without any significant findings. Patient was seen by [facility] (Dr. Wilks) in 9/2021. It was assessed that his history and exam were inconsistent with GBS and more consistent with HIV myelopathy.  He was evaluated by ID during admission in 6/21 for LE weakenss when he was noted to be Uncontrolled HIV, undomiciled, does not follow with an HIV specialist at this time and Off ARTs for about two months. He had HCV weakly reactive, Hep C RNA PCR negative, CSF analysis was negative for infection, lyme serum and csf negative and west nile virus serology negative, quant gold negative. His prior drug screens have been consistently positive for Cocaine, THC and opiates, however he only endorses to use of THC.  He was started on biktarvy at that visit, however hasnt followed up with an ID provider since then. Pt has one female sexual partner, uses condoms consistently. No hx of any oppurtunistic infections or other STIs as per pt. He does note that a few years back his CD4 count was below 200    Patient states that he has been having worsening recurrence of weakness with some difficulty initiating urine stream prompting him to come ED for evaluation. At this time, Patient continues to report LE weakness. He is not able to walk to the bathroom. He denies any recent fevers, chills, HA, blurry vision, SOB, CP, n/v/d, or any other symptoms. He reports compliance with ART and reports he doesnt remember CD4 count from last month, but his HIV VL was negative last month. Patient admitted to medicine for further neurological management and ID asked to comment on possiblilty for HIV myelopathy.    HIV VL: 7,525 (06-05-21)  CD4: 322 /uL (09-13/21)    IMPRESSION  ·	HIV   ·	Paraperesis concerned for GBS vs HIV myelopathy    RECOMMENDATION  Main complaint is BLE flaccid paralysis (left worse than right) with allodynia and paresthesia. No bowel or bladder sphincter involvement  c/w biktarvy  Please send CD4 and T cell subset  Please send Hep C ab, and Hep C RNA PCR  MRI C-Spine and T-Spine, both w/wo contrast recommend by neuro -> Agree    Pt seen and examined. 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  Pt reports his weakness is all in B/L LE Lt>Rt. It started in december 2017 and is episodic. He reports he gets weak for a few weeks and is unable to ambulate. It is associated with significant allodynia and paresthesia as per pt. He reports improvement after a few weeks with PT each times.  Patient states that he was diagnosed with GBS in 2018 at [place] after he presented there for bilateral LE weakness; although patient had an LP at that time and was told the results were negative. Since then, pt has had recurrent episodes of worsening BLE weakness with intermittent improvement between these episodes. Patient states that there are days he is able to walk unassisted, but otherwise uses a cane.  Patient has been seen in the hospital multiple times, most recently in 9/2021. Patient has had repeated MR cervical/thoracic/lumbar spine in the past without any significant findings. Patient was seen by [facility] (Dr. Wilks) in 9/2021. It was assessed that his history and exam were inconsistent with GBS and more consistent with HIV myelopathy.  He was evaluated by ID during admission in 6/21 for LE weakenss when he was noted to be Uncontrolled HIV, undomiciled, does not follow with an HIV specialist at this time and Off ARTs for about two months. He had HCV weakly reactive, Hep C RNA PCR negative, CSF analysis was negative for infection, lyme serum and csf negative and west nile virus serology negative, quant gold negative. His prior drug screens have been consistently positive for Cocaine, THC and opiates, however he only endorses to use of THC.  He was started on biktarvy at that visit, however hasnt followed up with an ID provider since then. Pt has one female sexual partner, uses condoms consistently. No hx of any oppurtunistic infections or other STIs as per pt. He does note that a few years back his CD4 count was below 200    Patient states that he has been having worsening recurrence of weakness with some difficulty initiating urine stream prompting him to come ED for evaluation. At this time, Patient continues to report LE weakness. He is not able to walk to the bathroom. He denies any recent fevers, chills, HA, blurry vision, SOB, CP, n/v/d, or any other symptoms. He reports compliance with ART and reports he doesnt remember CD4 count from last month, but his HIV VL was negative last month. Patient admitted to medicine for further neurological management and ID asked to comment on possiblilty for HIV myelopathy.    HIV VL: 7,525 (06-05-21)  CD4: 322 /uL (09-13/21)    IMPRESSION  ·	HIV   ·	B/L LE Paraperesis     RECOMMENDATION  Main complaint is BLE flaccid paralysis (left worse than right) with allodynia and paresthesia. No bowel or bladder sphincter involvement  c/w biktarvy  Please send CD4 and T cell subset  Please send Hep C ab, and Hep C RNA PCR  Unclear if can correlate pt's neurological symptoms to HIV.   HIV myelopathy is more of a diagnosis of exclusion in Late stage AIDs patient and doesn't completely fit pt's symptomology  Agree with continuing workup as per neuro  MRI C-Spine and T-Spine, both w/wo contrast recommend by neuro -> Agree    Pt seen and examined. Case d/w attending and primary team    Kahlil Montoya MD, PGY4   ID fellow  [employer] Teams Preferred/ Pager: [phone]  "[employer] pager ID: [phone] (would prefer to text page for any new consult or question, please include name/location and best call back number)  After 5pm/weekends call [phone]

## 2022-01-27 NOTE — H&P ADULT - NSHPLABSRESULTS_GEN_ALL_CORE
LABS:                         14.3   6.19  )-----------( 252      ( 27 Jan 2022 06:31 )             45.8     01-27    137  |  96<L>  |  15  ----------------------------<  68<L>  3.1<L>   |  24  |  0.97    Ca    9.0      27 Jan 2022 06:31    TPro  7.8  /  Alb  4.5  /  TBili  1.2  /  DBili  x   /  AST  36  /  ALT  22  /  AlkPhos  87  01-27                  RADIOLOGY, EKG & ADDITIONAL TESTS: Reviewed.

## 2022-01-27 NOTE — CONSULT NOTE ADULT - SUBJECTIVE AND OBJECTIVE BOX
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  GENITOURINARY: No dysuria or hematuria  NEUROLOGICAL: +As stated in HPI above  SKIN: No itching, burning, rashes, or lesions   All other review of systems is negative unless indicated above.    Ceclor (Unknown)  Toradol (Anaphylaxis; Swelling)  Toradol (Swelling)      PMHx:   HIV (human immunodeficiency virus infection)  Guillain-[place]  Asthma  HIV (human immunodeficiency virus infection)  CVA (cerebral vascular accident)  Closed fracture of multiple ribs of right side, initial encounter  Cocaine abuse  Chronic sinusitis  Homeless  HIV disease  Stroke    PFHx:   FH: HIV infection (Mother)    PSuHx:   History of orthopedic surgery      Medications:  MEDICATIONS  (STANDING):  acetaminophen     Tablet .. 650 milliGRAM(s) Oral once  aluminum hydroxide/magnesium hydroxide/simethicone Suspension 30 milliLiter(s) Oral once  famotidine    Tablet 20 milliGRAM(s) Oral once    MEDICATIONS  (PRN):      Vitals:  T(C): 36.1 (01-27-22 @ 02:55), Max: 36.7 (01-27-22 @ 01:33)  HR: 83 (01-27-22 @ 02:55) (80 - 83)  BP: 127/97 (01-27-22 @ 02:55) (127/97 - 147/91)  RR: 0 (01-27-22 @ 02:55) (0 - 16)  SpO2: 100% (01-27-22 @ 02:55) (100% - 100%)    Physical Examination: INCOMPLETE    General: in no acute distress, non-diaphoretic  Head: normocephalic, atraumatic  Eyes: non-icteric sclera, no conjunctival injection  Abdomen: soft, non-tender  Extremities: no lower extremity edema, no deformities    Neurologic:    - Mental Status: Alert, awake, oriented to person, place, and time; speech is fluent with intact naming, repetition, and comprehension; good overall fund of knowledge; immediate recall is 3/3 words and delayed recall is 3/3 words at 5 minutes; able to spell WORLD backwards and perform serial 7 subtraction; able to read and write a sentence.    - Cranial Nerves:  II: Visual fields are full to confrontation; pupils are equal, round, and reactive to light   III, IV, VI: Extraocular movements are intact without nystagmus  V: Facial sensation is intact in the V1-V3 distribution bilaterally  VII: Face is symmetric with normal eye closure and smile  VIII: Hearing is intact to conversation  IX, X: Uvula is midline and soft palate rises symmetrically  XI: Head turning intact  XII: Tongue protrudes in the midline    -Motor/Strength Testing:                                 Right           Left  Deltoid                     5                 5  Biceps                      5                 5  Triceps                     5                 5  Wrist Ext (radial)       5                 5  Wrist Flex                 5                 5  Interphalangeal        5                 5  APB (Thumb)            5                 5    Hip Flex                   5                  5  Knee Flex                 5                  5  Knee Ext	      5                  5  Dorsiflex                  5                  5  Plantarflex               5                  5    - There is no pronator drift. Normal muscle bulk and tone throughout.    - Reflexes:   Bicep (C5/C6):                  R 2+ --- L 2+   Brachioradialis (C5/C6) :   R 2+ --- L 2+   Patella (L3/L4) :                 R 2+ --- L 2+   Ankle (S1) :                       R 2+ --- L 2+     - Plant responses down bilaterally.    - Sensory: Intact throughout to light touch.   - Coordination: Finger-nose-finger intact without dysmetria.    - Gait: Normal steps, base, arm swing, and turning. Romberg testing is negative.    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 Patient states that he was diagnosed with GBS in 2018 at [facility]. Since that time, he has had recurrent episodes of worsening BLE weakness, with intermittent improvement between these episodes. Patient states that he walks unassisted at times, and at other times, he walks with a cane.     Per chart review, patient was last seen by [facility] (Dr. Wilks) for similar complaint of BLE weakness 9/2021. Per review, patient's symptoms started in 2018 one night while getting OOB when he noted weakness in BLE, and limped when walking. The following morning, he could not walk, and fell trying to get to the bathroom. He was taken to [facility], from which he was sent to [facility] with weakness and spasms. He was subsequently given a diagnosis of GBS. Patient had an LP at that time, and was told the results were negative. He also had involuntary spasms, which were treated with baclofen. At that time, he was sent to acute rehab where he had two successive 30-day admissions. Patient eventually recovered 50% of his strength, and continued to have spasms.    On 3/25/20, he c/o worsened LLE weakness, w/ NIHSS: 2, and diagnosis of possible CVA, for which he was given rTPA. CTH, CTA H/N, and CTP were all negative at that time. Patient's MRI report were w/o evidence of acute ischemia, w/ white matter findings raising possibility of underlying inflammatory and/or demyelinating disease. MRI C-spine w/wo 5/21/21: motion degraded, otherwise negative. Patient had no significant findings on three MRs of L-spine May through June 2021. No significant findings on MR T-spine 10/2020. Patient's examination 9/2021 notable for quadriparesis, weaker on L side, w/ markedly greater weakness in LEs compared w/ UEs. Upper extremity DTRs -- b/l biceps 2, b/l triceps 2-, b/l brachioradialis 2+. Lower extremity DTRs -- b/l patellar 3+, b/l gastroc 3. At that time, it is noted that his history and examination was inconsistent and incompatible with GBS, respectively. His diagnosis from Neurology: HIV myelopathy.         Review of Systems:     CONSTITUTIONAL: No fevers or chills  EYES/ENT: No visual changes or no throat pain   NECK: No pain or stiffness  RESPIRATORY: No hemoptysis or shortness of breath  CARDIOVASCULAR: No palpitations  GASTROINTESTINAL: No melena or hematochezia.  GENITOURINARY: +Urinary retention. No dysuria or hematuria  NEUROLOGICAL: +As stated in HPI above  All other review of systems is negative unless indicated above.    Ceclor (Unknown)  Toradol (Anaphylaxis; Swelling)  Toradol (Swelling)      PMHx:   HIV (human immunodeficiency virus infection)  Guillain-[place]  Asthma  HIV (human immunodeficiency virus infection)  CVA (cerebral vascular accident)  Closed fracture of multiple ribs of right side, initial encounter  Cocaine abuse  Chronic sinusitis  Homeless  HIV disease  Stroke    PFHx:   FH: HIV infection (Mother)    PSuHx:   History of orthopedic surgery      Medications:  MEDICATIONS  (STANDING):  acetaminophen     Tablet .. 650 milliGRAM(s) Oral once  aluminum hydroxide/magnesium hydroxide/simethicone Suspension 30 milliLiter(s) Oral once  famotidine    Tablet 20 milliGRAM(s) Oral once    MEDICATIONS  (PRN):      Vitals:  T(C): 36.1 (01-27-22 @ 02:55), Max: 36.7 (01-27-22 @ 01:33)  HR: 83 (01-27-22 @ 02:55) (80 - 83)  BP: 127/97 (01-27-22 @ 02:55) (127/97 - 147/91)  RR: 0 (01-27-22 @ 02:55) (0 - 16)  SpO2: 100% (01-27-22 @ 02:55) (100% - 100%)    Physical Examination: INCOMPLETE    General: in no acute distress, non-diaphoretic  Head: normocephalic, atraumatic  Eyes: non-icteric sclera, no conjunctival injection  Abdomen: soft, non-tender  Extremities: no lower extremity edema, no deformities    Neurologic:    - Mental Status: Alert, awake, oriented to person, place, and time; speech is fluent with intact naming, repetition, and comprehension; good overall fund of knowledge; immediate recall is 3/3 words and delayed recall is 3/3 words at 5 minutes; able to spell WORLD backwards and perform serial 7 subtraction; able to read and write a sentence.    - Cranial Nerves:  II: Visual fields are full to confrontation; pupils are equal, round, and reactive to light   III, IV, VI: Extraocular movements are intact without nystagmus  V: Facial sensation is intact in the V1-V3 distribution bilaterally  VII: Face is symmetric with normal eye closure and smile  VIII: Hearing is intact to conversation  IX, X: Uvula is midline and soft palate rises symmetrically  XI: Head turning intact  XII: Tongue protrudes in the midline    -Motor/Strength Testing:                                 Right           Left  Deltoid                     5                 5  Biceps                      5                 5  Triceps                     5                 5  Wrist Ext (radial)       5                 5  Wrist Flex                 5                 5  Interphalangeal        5                 5  APB (Thumb)            5                 5    Hip Flex                   5                  5  Knee Flex                 5                  5  Knee Ext	      5                  5  Dorsiflex                  5                  5  Plantarflex               5                  5    - There is no pronator drift. Normal muscle bulk and tone throughout.    - Reflexes:   Bicep (C5/C6):                  R 2+ --- L 2+   Brachioradialis (C5/C6) :   R 2+ --- L 2+   Patella (L3/L4) :                 R 2+ --- L 2+   Ankle (S1) :                       R 2+ --- L 2+     - Plant responses down bilaterally.    - Sensory: Intact throughout to light touch.   - Coordination: Finger-nose-finger intact without dysmetria.    - Gait: Normal steps, base, arm swing, and turning. Romberg testing is negative.    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 Patient states that he was diagnosed with GBS in 2018 at [place]. Since that time, he has had recurrent episodes of worsening BLE weakness, with intermittent improvement between these episodes. Patient states that he walks unassisted at times, and at other times, he walks with a cane.     Per chart review, patient was last seen by [facility] (Dr. Wilks) for similar complaint of BLE weakness 9/2021. Per review, patient's symptoms started in 2018 one night while getting OOB when he noted weakness in BLE, and limped when walking. The following morning, he could not walk, and fell trying to get to the bathroom. He was taken to [facility], from which he was sent to [place] with weakness and spasms. He was subsequently given a diagnosis of GBS. Patient had an LP at that time, and was told the results were negative. He also had involuntary spasms, which were treated with baclofen. At that time, he was sent to acute rehab where he had two successive 30-day admissions. Patient eventually recovered 50% of his strength, and continued to have spasms.    On 3/25/20, he c/o worsened LLE weakness, w/ NIHSS: 2, and diagnosis of possible CVA, for which he was given rTPA. CTH, CTA H/N, and CTP were all negative at that time. Patient's MRI report were w/o evidence of acute ischemia, w/ white matter findings raising possibility of underlying inflammatory and/or demyelinating disease. MRI C-spine w/wo 5/21/21: motion degraded, otherwise negative. Patient had no significant findings on three MRs of L-spine May through June 2021. No significant findings on MR T-spine 10/2020. Patient's examination 9/2021 notable for quadriparesis, weaker on L side, w/ markedly greater weakness in LEs compared w/ UEs. Upper extremity DTRs -- b/l biceps 2, b/l triceps 2-, b/l brachioradialis 2+. Lower extremity DTRs -- b/l patellar 3+, b/l gastroc 3. At that time, it is noted that his history and examination was inconsistent and incompatible with GBS, respectively. His diagnosis from Neurology: HIV myelopathy.         Review of Systems:     CONSTITUTIONAL: No fevers or chills  EYES/ENT: No visual changes or no throat pain   NECK: No pain or stiffness  RESPIRATORY: No hemoptysis or shortness of breath  CARDIOVASCULAR: No palpitations  GASTROINTESTINAL: No melena or hematochezia.  GENITOURINARY: +Urinary retention. No dysuria or hematuria  NEUROLOGICAL: +As stated in HPI above  All other review of systems is negative unless indicated above.    Ceclor (Unknown)  Toradol (Anaphylaxis; Swelling)  Toradol (Swelling)      PMHx:   HIV (human immunodeficiency virus infection)  Guillain-[place]  Asthma  HIV (human immunodeficiency virus infection)  CVA (cerebral vascular accident)  Closed fracture of multiple ribs of right side, initial encounter  Cocaine abuse  Chronic sinusitis  Homeless  HIV disease  Stroke    PFHx:   FH: HIV infection (Mother)    PSuHx:   History of orthopedic surgery      Medications:  MEDICATIONS  (STANDING):  acetaminophen     Tablet .. 650 milliGRAM(s) Oral once  aluminum hydroxide/magnesium hydroxide/simethicone Suspension 30 milliLiter(s) Oral once  famotidine    Tablet 20 milliGRAM(s) Oral once    MEDICATIONS  (PRN):      Vitals:  T(C): 36.1 (01-27-22 @ 02:55), Max: 36.7 (01-27-22 @ 01:33)  HR: 83 (01-27-22 @ 02:55) (80 - 83)  BP: 127/97 (01-27-22 @ 02:55) (127/97 - 147/91)  RR: 0 (01-27-22 @ 02:55) (0 - 16)  SpO2: 100% (01-27-22 @ 02:55) (100% - 100%)    Physical Examination:      General: in no acute distress, w/ exception of pain from intermittent back spasms   Head: normocephalic, atraumatic  Eyes: non-icteric sclera, no conjunctival injection  Abdomen: soft, non-tender  Extremities: no lower extremity edema, no deformities    Neurologic:    - Mental Status: Alert, awake, oriented to person, place, and time; speech is fluent with intact naming, repetition, and comprehension; good overall fund of knowledge;      - Cranial Nerves:  II: Visual fields are full to confrontation; pupils are equal, round, and reactive to light   III, IV, VI: Extraocular movements are intact without nystagmus  V: Facial sensation is intact in the V1-V3 distribution bilaterally  VII: Face is symmetric with normal eye closure and smile  VIII: Hearing is intact to conversation  IX, X: Uvula is midline and soft palate rises symmetrically  XI: Head turning intact  XII: Tongue protrudes in the midline    - Motor/Strength Testing: lower extremity exam limited by pain that radiates to lower back                                   Right           Left  Deltoid                     5                 5  Biceps                      5                 5  Triceps                     5                 5  Wrist Ext (radial)       5                 5  Hand                 5                 5    Hip Flex: Patient unable to elevate b/l hips off of the bed.   Knee Ext: Patient unable to maintain leg at 180 degrees when examiner supports hips in elevated position, although there is effort anti-gravity; right knee extension w/ greater effort and movement anti-gravity (although not sustained) compared w/ left knee extension  Dorsiflex: Patient minimally dorsiflexes bilaterally along the plane of gravity; most significant movement is w/ toe movement toward head of bed.  Plantarflex: Patient minimally plantarflexes bilaterally along the plane of gravity; most significant movement is w/ toe movement.      - There is no pronator drift. Normal muscle bulk and tone throughout.    - Reflexes:   Bicep (C5/C6):                  R 2+ --- L 2+   Brachioradialis (C5/C6) :   R 2+ --- L 2+   Patella (L3/L4) :                 R 3+ --- L 3   Ankle (S1) :                       R 3+ --- L 3+     - Unable to adequately assess plantar response as patient reporting hypersensitivity in b/l plantar aspect  - 2-3 beats of clonus b/l    - Sensory: Intact throughout to light touch. Intact to proprioception distal BLE. Intact to vibration distal BLE.  - Coordination: Finger-nose-finger intact without dysmetria.    - Gait: Unable to assess d/t BLE weakness.    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 Patient states that he was diagnosed with GBS in 2018 at [place]. Since that time, he has had recurrent episodes of worsening BLE weakness, with intermittent improvement between these episodes. Patient states that he walks unassisted at times, and at other times, he walks with a cane. Patient states that this recurrence of weakness has been occurring at an increasing frequency, now experiencing them about once a month. He also c/o back spasms, which have improved somewhat on a new medication (patient unsure of the name of the medication, other than that it is not baclofen or tizanidine). He also c/o burning neuropathic type pain on the plantar aspects of BLE.     Per chart review, patient was last seen by [facility] (Dr. Wilks) for similar complaint of BLE weakness 9/2021. Per review, patient's symptoms started in 2018 one night while getting OOB when he noted weakness in BLE, and limped when walking. The following morning, he could not walk, and fell trying to get to the bathroom. He was taken to [facility], from which he was sent to [place] with weakness and spasms. He was subsequently given a diagnosis of GBS. Patient had an LP at that time, and was told the results were negative. He also had involuntary spasms, which were treated with baclofen. At that time, he was sent to acute rehab where he had two successive 30-day admissions. Patient eventually recovered 50% of his strength, and continued to have spasms.    On 3/25/20, he c/o worsened LLE weakness, w/ NIHSS: 2, and diagnosis of possible CVA, for which he was given rTPA. CTH, CTA H/N, and CTP were all negative at that time. Patient's MRI report were w/o evidence of acute ischemia, w/ white matter findings raising possibility of underlying inflammatory and/or demyelinating disease. MRI C-spine w/wo 5/21/21: motion degraded, otherwise negative. Patient had no significant findings on three MRs of L-spine May through June 2021. No significant findings on MR T-spine 10/2020. Patient's examination 9/2021 notable for quadriparesis, weaker on L side, w/ markedly greater weakness in LEs compared w/ UEs. Upper extremity DTRs -- b/l biceps 2, b/l triceps 2-, b/l brachioradialis 2+. Lower extremity DTRs -- b/l patellar 3+, b/l gastroc 3. At that time, it is noted that his history and examination was inconsistent and incompatible with GBS, respectively. His diagnosis from Neurology: HIV myelopathy.     Patient reports recent HIV load and CD4 count, however he had not received the report.    Review of Systems:     CONSTITUTIONAL: No fevers or chills  EYES/ENT: No visual changes or no throat pain   NECK: No pain or stiffness  RESPIRATORY: No hemoptysis or shortness of breath  CARDIOVASCULAR: No palpitations  GASTROINTESTINAL: No melena or hematochezia.  GENITOURINARY: +Urinary retention. No dysuria or hematuria  NEUROLOGICAL: +As stated in HPI above  All other review of systems is negative unless indicated above.    Ceclor (Unknown)  Toradol (Anaphylaxis; Swelling)  Toradol (Swelling)      PMHx:   HIV (human immunodeficiency virus infection)  Guillain-[place]  Asthma  HIV (human immunodeficiency virus infection)  CVA (cerebral vascular accident)  Closed fracture of multiple ribs of right side, initial encounter  Cocaine abuse  Chronic sinusitis  Homeless  HIV disease  Stroke    PFHx:   FH: HIV infection (Mother)    PSuHx:   History of orthopedic surgery      Medications:  MEDICATIONS  (STANDING):  acetaminophen     Tablet .. 650 milliGRAM(s) Oral once  aluminum hydroxide/magnesium hydroxide/simethicone Suspension 30 milliLiter(s) Oral once  famotidine    Tablet 20 milliGRAM(s) Oral once    MEDICATIONS  (PRN):      Vitals:  T(C): 36.1 (01-27-22 @ 02:55), Max: 36.7 (01-27-22 @ 01:33)  HR: 83 (01-27-22 @ 02:55) (80 - 83)  BP: 127/97 (01-27-22 @ 02:55) (127/97 - 147/91)  RR: 0 (01-27-22 @ 02:55) (0 - 16)  SpO2: 100% (01-27-22 @ 02:55) (100% - 100%)    Physical Examination:      General: in no acute distress, w/ exception of pain from intermittent back spasms   Head: normocephalic, atraumatic  Eyes: non-icteric sclera, no conjunctival injection  Abdomen: soft, non-tender  Extremities: no lower extremity edema, no deformities    Neurologic:    - Mental Status: Alert, awake, oriented to person, place, and time; speech is fluent with intact naming, repetition, and comprehension; good overall fund of knowledge;      - Cranial Nerves:  II: Visual fields are full to confrontation; pupils are equal, round, and reactive to light   III, IV, VI: Extraocular movements are intact without nystagmus  V: Facial sensation is intact in the V1-V3 distribution bilaterally  VII: Face is symmetric with normal eye closure and smile  VIII: Hearing is intact to conversation  IX, X: Uvula is midline and soft palate rises symmetrically  XI: Head turning intact  XII: Tongue protrudes in the midline    - Motor/Strength Testing: lower extremity exam limited by pain that radiates to lower back                                   Right           Left  Deltoid                     5                 5  Biceps                      5                 5  Triceps                     5                 5  Wrist Ext (radial)       5                 5  Hand                 5                 5    Hip Flex: Patient unable to elevate b/l hips off of the bed.   Knee Ext: Patient unable to maintain leg at 180 degrees when examiner supports hips in elevated position, although there is effort anti-gravity; right knee extension w/ greater effort and movement anti-gravity (although not sustained) compared w/ left knee extension  Dorsiflex: Patient minimally dorsiflexes bilaterally along the plane of gravity; most significant movement is w/ toe movement toward head of bed.  Plantarflex: Patient minimally plantarflexes bilaterally along the plane of gravity; most significant movement is w/ toe movement.      - There is no pronator drift. Normal muscle bulk and tone throughout.    - Reflexes:   Bicep (C5/C6):                  R 2+ --- L 2+   Brachioradialis (C5/C6) :   R 2+ --- L 2+   Patella (L3/L4) :                 R 3+ --- L 3   Ankle (S1) :                       R 3+ --- L 3+     - Unable to adequately assess plantar response as patient reporting hypersensitivity in b/l plantar aspect  - 2-3 beats of clonus b/l    - Sensory: Intact throughout to light touch. Intact to proprioception distal BLE. Intact to vibration distal BLE.  - Coordination: Finger-nose-finger intact without dysmetria.    - Gait: Unable to assess d/t BLE weakness.    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 Patient states that he was diagnosed with GBS in 2018 at [place]. Since that time, he has had recurrent episodes of worsening BLE weakness, with intermittent improvement between these episodes. Patient states that he walks unassisted at times, and at other times, he walks with a cane. Patient states that this recurrence of weakness has been occurring at an increasing frequency, now experiencing them about once a month. He also c/o back spasms, which have improved somewhat on a new medication (patient unsure of the name of the medication, other than that it is not baclofen or tizanidine). He also c/o burning neuropathic type pain on the plantar aspects of BLE.     Per chart review, patient was last seen by [facility] Neurology (Dr. Wilks) for similar complaint of BLE weakness 9/2021. Per review, patient's symptoms started in 2018 one night while getting OOB when he noted weakness in BLE, and limped when walking. The following morning, he could not walk, and fell trying to get to the bathroom. He was taken to [facility], from which he was sent to [place] with weakness and spasms. He was subsequently given a diagnosis of GBS. Patient had an LP at that time, and was told the results were negative. He also had involuntary spasms, which were treated with baclofen. At that time, he was sent to acute rehab where he had two successive 30-day admissions. Patient eventually recovered 50% of his strength, and continued to have spasms.    On 3/25/20, he c/o worsened LLE weakness, w/ NIHSS: 2, and diagnosis of possible CVA, for which he was given rTPA. CTH, CTA H/N, and CTP were all negative at that time. Patient's MRI report were w/o evidence of acute ischemia, w/ white matter findings raising possibility of underlying inflammatory and/or demyelinating disease. MRI C-spine w/wo 5/21/21: motion degraded, otherwise negative. Patient had no significant findings on three MRs of L-spine May through June 2021. No significant findings on MR T-spine 10/2020. Patient's examination 9/2021 notable for quadriparesis, weaker on L side, w/ markedly greater weakness in LEs compared w/ UEs. Upper extremity DTRs -- b/l biceps 2, b/l triceps 2-, b/l brachioradialis 2+. Lower extremity DTRs -- b/l patellar 3+, b/l gastroc 3. At that time, it is noted that his history and examination was inconsistent and incompatible with GBS, respectively. His diagnosis from Neurology: HIV myelopathy.     Patient reports recent HIV load and CD4 count, however he had not received the report.    Review of Systems:     CONSTITUTIONAL: No fevers or chills  EYES/ENT: No visual changes or no throat pain   NECK: No pain or stiffness  RESPIRATORY: No hemoptysis or shortness of breath  CARDIOVASCULAR: No palpitations  GASTROINTESTINAL: No melena or hematochezia.  GENITOURINARY: +Urinary retention. No dysuria or hematuria  NEUROLOGICAL: +As stated in HPI above  All other review of systems is negative unless indicated above.    Ceclor (Unknown)  Toradol (Anaphylaxis; Swelling)  Toradol (Swelling)      PMHx:   HIV (human immunodeficiency virus infection)  Guillain-[place]  Asthma  HIV (human immunodeficiency virus infection)  CVA (cerebral vascular accident)  Closed fracture of multiple ribs of right side, initial encounter  Cocaine abuse  Chronic sinusitis  Homeless  HIV disease  Stroke    PFHx:   FH: HIV infection (Mother)    PSuHx:   History of orthopedic surgery      Medications:  MEDICATIONS  (STANDING):  acetaminophen     Tablet .. 650 milliGRAM(s) Oral once  aluminum hydroxide/magnesium hydroxide/simethicone Suspension 30 milliLiter(s) Oral once  famotidine    Tablet 20 milliGRAM(s) Oral once    MEDICATIONS  (PRN):      Vitals:  T(C): 36.1 (01-27-22 @ 02:55), Max: 36.7 (01-27-22 @ 01:33)  HR: 83 (01-27-22 @ 02:55) (80 - 83)  BP: 127/97 (01-27-22 @ 02:55) (127/97 - 147/91)  RR: 0 (01-27-22 @ 02:55) (0 - 16)  SpO2: 100% (01-27-22 @ 02:55) (100% - 100%)    Physical Examination:      General: in no acute distress, w/ exception of pain from intermittent back spasms   Head: normocephalic, atraumatic  Eyes: non-icteric sclera, no conjunctival injection  Abdomen: soft, non-tender  Extremities: no lower extremity edema, no deformities    Neurologic:    - Mental Status: Alert, awake, oriented to person, place, and time; speech is fluent with intact naming, repetition, and comprehension; good overall fund of knowledge;      - Cranial Nerves:  II: Visual fields are full to confrontation; pupils are equal, round, and reactive to light   III, IV, VI: Extraocular movements are intact without nystagmus  V: Facial sensation is intact in the V1-V3 distribution bilaterally  VII: Face is symmetric with normal eye closure and smile  VIII: Hearing is intact to conversation  IX, X: Uvula is midline and soft palate rises symmetrically  XI: Head turning intact  XII: Tongue protrudes in the midline    - Motor/Strength Testing: lower extremity exam limited by pain that radiates to lower back                                   Right           Left  Deltoid                     5                 5  Biceps                      5                 5  Triceps                     5                 5  Wrist Ext (radial)       5                 5  Hand                 5                 5    Hip Flex: Patient unable to elevate b/l hips off of the bed.   Knee Ext: Patient unable to maintain leg at 180 degrees when examiner supports hips in elevated position, although there is effort anti-gravity; right knee extension w/ greater effort and movement anti-gravity (although not sustained) compared w/ left knee extension  Dorsiflex: Patient minimally dorsiflexes bilaterally along the plane of gravity; most significant movement is w/ toe movement toward head of bed.  Plantarflex: Patient minimally plantarflexes bilaterally along the plane of gravity; most significant movement is w/ toe movement.      - There is no pronator drift. Normal muscle bulk and tone throughout.    - Reflexes:   Bicep (C5/C6):                  R 2+ --- L 2+   Brachioradialis (C5/C6) :   R 2+ --- L 2+   Patella (L3/L4) :                 R 3+ --- L 3   Ankle (S1) :                       R 3+ --- L 3+     - Unable to adequately assess plantar response as patient reporting hypersensitivity in b/l plantar aspect  - 2-3 beats of clonus b/l    - Sensory: Intact throughout to light touch. Intact to proprioception distal BLE. Intact to vibration distal BLE.  - Coordination: Finger-nose-finger intact without dysmetria.    - Gait: Unable to assess d/t BLE weakness.    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 Patient states that he was diagnosed with GBS in 2018 at [place]. Since that time, he has had recurrent episodes of worsening BLE weakness, with intermittent improvement between these episodes. Patient states that he walks unassisted at times, and at other times, he walks with a cane. Today, patient states that he had a similar deterioration of BLE weakness, also a/w urinary retention for the last couple of days. Patient states that this recurrence of weakness has been occurring at an increasing frequency, now experiencing them about once a month. He also c/o back spasms, which have improved somewhat on a new medication (patient unsure of the name of the medication, other than that it is not baclofen or tizanidine). He also c/o burning neuropathic type pain on the plantar aspects of BLE.     Per chart review, patient was last seen by [facility] Neurology (Dr. Wilks) for similar complaint of BLE weakness 9/2021. Per review, patient's symptoms started in 2018 one night while getting OOB when he noted weakness in BLE, and limped when walking. The following morning, he could not walk, and fell trying to get to the bathroom. He was taken to [facility], from which he was sent to [place] with weakness and spasms. He was subsequently given a diagnosis of GBS. Patient had an LP at that time, and was told the results were negative. He also had involuntary spasms, which were treated with baclofen. At that time, he was sent to acute rehab where he had two successive 30-day admissions. Patient eventually recovered 50% of his strength, and continued to have spasms.    On 3/25/20, he c/o worsened LLE weakness, w/ NIHSS: 2, and diagnosis of possible CVA, for which he was given rTPA. CTH, CTA H/N, and CTP were all negative at that time. Patient's MRI report were w/o evidence of acute ischemia, w/ white matter findings raising possibility of underlying inflammatory and/or demyelinating disease. MRI C-spine w/wo 5/21/21: motion degraded, otherwise negative. Patient had no significant findings on three MRs of L-spine May through June 2021. No significant findings on MR T-spine 10/2020. Patient's examination 9/2021 notable for quadriparesis, weaker on L side, w/ markedly greater weakness in LEs compared w/ UEs. Upper extremity DTRs -- b/l biceps 2, b/l triceps 2-, b/l brachioradialis 2+. Lower extremity DTRs -- b/l patellar 3+, b/l gastroc 3. At that time, it is noted that his history and examination was inconsistent and incompatible with GBS, respectively. His diagnosis from Neurology: HIV myelopathy.     Patient reports recent HIV load and CD4 count, however he had not received the report.    Review of Systems:     CONSTITUTIONAL: No fevers or chills  EYES/ENT: No visual changes or no throat pain   NECK: No pain or stiffness  RESPIRATORY: No hemoptysis or shortness of breath  CARDIOVASCULAR: No palpitations  GASTROINTESTINAL: No melena or hematochezia.  GENITOURINARY: +Urinary retention. No dysuria or hematuria  NEUROLOGICAL: +As stated in HPI above  All other review of systems is negative unless indicated above.    Ceclor (Unknown)  Toradol (Anaphylaxis; Swelling)  Toradol (Swelling)      PMHx:   HIV (human immunodeficiency virus infection)  Guillain-[place]  Asthma  HIV (human immunodeficiency virus infection)  CVA (cerebral vascular accident)  Closed fracture of multiple ribs of right side, initial encounter  Cocaine abuse  Chronic sinusitis  Homeless  HIV disease  Stroke    PFHx:   FH: HIV infection (Mother)    PSuHx:   History of orthopedic surgery      Medications:  MEDICATIONS  (STANDING):  acetaminophen     Tablet .. 650 milliGRAM(s) Oral once  aluminum hydroxide/magnesium hydroxide/simethicone Suspension 30 milliLiter(s) Oral once  famotidine    Tablet 20 milliGRAM(s) Oral once    MEDICATIONS  (PRN):      Vitals:  T(C): 36.1 (01-27-22 @ 02:55), Max: 36.7 (01-27-22 @ 01:33)  HR: 83 (01-27-22 @ 02:55) (80 - 83)  BP: 127/97 (01-27-22 @ 02:55) (127/97 - 147/91)  RR: 0 (01-27-22 @ 02:55) (0 - 16)  SpO2: 100% (01-27-22 @ 02:55) (100% - 100%)    Physical Examination:      General: in no acute distress, w/ exception of pain from intermittent back spasms   Head: normocephalic, atraumatic  Eyes: non-icteric sclera, no conjunctival injection  Abdomen: soft, non-tender  Extremities: no lower extremity edema, no deformities    Neurologic:    - Mental Status: Alert, awake, oriented to person, place, and time; speech is fluent with intact naming, repetition, and comprehension; good overall fund of knowledge;      - Cranial Nerves:  II: Visual fields are full to confrontation; pupils are equal, round, and reactive to light   III, IV, VI: Extraocular movements are intact without nystagmus  V: Facial sensation is intact in the V1-V3 distribution bilaterally  VII: Face is symmetric with normal eye closure and smile  VIII: Hearing is intact to conversation  IX, X: Uvula is midline and soft palate rises symmetrically  XI: Head turning intact  XII: Tongue protrudes in the midline    - Motor/Strength Testing: lower extremity exam limited by pain that radiates to lower back                                   Right           Left  Deltoid                     5                 5  Biceps                      5                 5  Triceps                     5                 5  Wrist Ext (radial)       5                 5  Hand                 5                 5    Hip Flex: Patient unable to elevate b/l hips off of the bed.   Knee Ext: Patient unable to maintain leg at 180 degrees when examiner supports hips in elevated position, although there is effort anti-gravity; right knee extension w/ greater effort and movement anti-gravity (although not sustained) compared w/ left knee extension  Dorsiflex: Patient minimally dorsiflexes bilaterally along the plane of gravity; most significant movement is w/ toe movement toward head of bed.  Plantarflex: Patient minimally plantarflexes bilaterally along the plane of gravity; most significant movement is w/ toe movement.      - There is no pronator drift. Normal muscle bulk and tone throughout.    - Reflexes:   Bicep (C5/C6):                  R 2+ --- L 2+   Brachioradialis (C5/C6) :   R 2+ --- L 2+   Patella (L3/L4) :                 R 3+ --- L 3   Ankle (S1) :                       R 3+ --- L 3+     - Unable to adequately assess plantar response as patient reporting hypersensitivity in b/l plantar aspect  - 2-3 beats of clonus b/l    - Sensory: Intact throughout to light touch. Intact to proprioception distal BLE. Intact to vibration distal BLE.  - Coordination: Finger-nose-finger intact without dysmetria.    - Gait: Unable to assess d/t BLE weakness.    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 Patient states that he was diagnosed with GBS in 2018 at [place]. Since that time, he has had recurrent episodes of worsening BLE weakness, with intermittent improvement between these episodes. Patient states that he walks unassisted at times, and at other times, he walks with a cane. Today, patient states that he had a similar deterioration of BLE weakness, also a/w urinary retention for the last couple of days. Patient states that this recurrence of weakness has been occurring at an increasing frequency, now experiencing them about once a month. He also c/o back spasms, which have improved somewhat on a new medication (patient unsure of the name of the medication, other than that it is not baclofen or tizanidine). He also c/o burning neuropathic type pain on the plantar aspects of BLE.     Per chart review, patient was last seen by [facility] Neurology (Dr. Wilks) for similar complaint of BLE weakness 9/2021. Per review, patient's symptoms started in 2018 one night while getting OOB when he noted weakness in BLE, and limped when walking. The following morning, he could not walk, and fell trying to get to the bathroom. He was taken to [facility], from which he was sent to [place] with weakness and spasms. He was subsequently given a diagnosis of GBS. Patient had an LP at that time, and was told the results were negative. He also had involuntary spasms, which were treated with baclofen. At that time, he was sent to acute rehab where he had two successive 30-day admissions. Patient eventually recovered 50% of his strength, and continued to have spasms.    On 3/25/20, he c/o worsened LLE weakness, w/ NIHSS: 2, and diagnosis of possible CVA, for which he was given rTPA. CTH, CTA H/N, and CTP were all negative at that time. Patient's MRI report were w/o evidence of acute ischemia, w/ white matter findings raising possibility of underlying inflammatory and/or demyelinating disease. MRI C-spine w/wo 5/21/21: motion degraded, otherwise negative. Patient had no significant findings on three MRs of L-spine May through June 2021. No significant findings on MR T-spine 10/2020. Patient's examination 9/2021 notable for quadriparesis, weaker on L side, w/ markedly greater weakness in LEs compared w/ UEs. Upper extremity DTRs -- b/l biceps 2, b/l triceps 2-, b/l brachioradialis 2+. Lower extremity DTRs -- b/l patellar 3+, b/l gastroc 3. At that time, it is noted that his history and examination was inconsistent and incompatible with GBS, respectively. His diagnosis from Neurology: HIV myelopathy.     Patient reports recent HIV load and CD4 count, however he had not received the report.    Review of Systems:     CONSTITUTIONAL: No fevers or chills  EYES/ENT: No visual changes or no throat pain   NECK: No pain or stiffness  RESPIRATORY: No hemoptysis or shortness of breath  CARDIOVASCULAR: No palpitations  GASTROINTESTINAL: No melena or hematochezia.  GENITOURINARY: +Urinary retention. No dysuria or hematuria  NEUROLOGICAL: +As stated in HPI above  All other review of systems is negative unless indicated above.    Ceclor (Unknown)  Toradol (Anaphylaxis; Swelling)  Toradol (Swelling)      PMHx:   HIV (human immunodeficiency virus infection)  Guillain-[place]  Asthma  HIV (human immunodeficiency virus infection)  CVA (cerebral vascular accident)  Closed fracture of multiple ribs of right side, initial encounter  Cocaine abuse  Chronic sinusitis  Homeless  HIV disease  Stroke    PFHx:   FH: HIV infection (Mother)    PSuHx:   History of orthopedic surgery      Medications:  MEDICATIONS  (STANDING):  acetaminophen     Tablet .. 650 milliGRAM(s) Oral once  aluminum hydroxide/magnesium hydroxide/simethicone Suspension 30 milliLiter(s) Oral once  famotidine    Tablet 20 milliGRAM(s) Oral once    MEDICATIONS  (PRN):      Vitals:  T(C): 36.1 (01-27-22 @ 02:55), Max: 36.7 (01-27-22 @ 01:33)  HR: 83 (01-27-22 @ 02:55) (80 - 83)  BP: 127/97 (01-27-22 @ 02:55) (127/97 - 147/91)  RR: 0 (01-27-22 @ 02:55) (0 - 16)  SpO2: 100% (01-27-22 @ 02:55) (100% - 100%)    Physical Examination:      General: in no acute distress, w/ exception of pain from intermittent back spasms   Head: normocephalic, atraumatic  Eyes: non-icteric sclera, no conjunctival injection  Abdomen: soft, non-tender  Extremities: no lower extremity edema, no deformities    Neurologic:    ALSO SEE ATTENDING ADDENDUM    - Mental Status: Alert, awake, oriented to person, place, and time; speech is fluent with intact naming, repetition, and comprehension; good overall fund of knowledge;      - Cranial Nerves:  II: Visual fields are full to confrontation; pupils are equal, round, and reactive to light   III, IV, VI: Extraocular movements are intact without nystagmus  V: Facial sensation is intact in the V1-V3 distribution bilaterally  VII: Face is symmetric with normal eye closure and smile  VIII: Hearing is intact to conversation  IX, X: Uvula is midline and soft palate rises symmetrically  XI: Head turning intact  XII: Tongue protrudes in the midline    - Motor/Strength Testing: lower extremity exam limited by pain that radiates to lower back                                   Right           Left  Deltoid                     5                 5  Biceps                      5                 5  Triceps                     5                 5  Wrist Ext (radial)       5                 5  Hand                 5                 5    Hip Flex: Patient unable to elevate b/l hips off of the bed.   Knee Ext: Patient unable to maintain leg at 180 degrees when examiner supports hips in elevated position, although there is effort anti-gravity; right knee extension w/ greater effort and movement anti-gravity (although not sustained) compared w/ left knee extension  Dorsiflex: Patient minimally dorsiflexes bilaterally along the plane of gravity; most significant movement is w/ toe movement toward head of bed.  Plantarflex: Patient minimally plantarflexes bilaterally along the plane of gravity; most significant movement is w/ toe movement.      - There is no pronator drift. Normal muscle bulk and tone throughout.    - Reflexes:   Bicep (C5/C6):                  R 2+ --- L 2+   Brachioradialis (C5/C6) :   R 2+ --- L 2+   Patella (L3/L4) :                 R 3+ --- L 3   Ankle (S1) :                       R 3+ --- L 3+     - Unable to adequately assess plantar response as patient reporting hypersensitivity in b/l plantar aspect  - 2-3 beats of clonus b/l    - Sensory: Intact throughout to light touch. Intact to proprioception distal BLE. Intact to vibration distal BLE.  - Coordination: Finger-nose-finger intact without dysmetria.    - Gait: Unable to assess d/t BLE weakness.    Labs:     Radiology:

## 2022-01-27 NOTE — ED PROVIDER NOTE - OBJECTIVE STATEMENT
Pt is a 31 m with hx of HIV from birth, gbs who presents with chest pain for 2 days, constant, positional, nonexertional, nonradiating mild in severity associated with myalgias. Pain reproducible with arm movement. Pt denies any associated n/v, no diaphoresis, sob, cough. Covid vaccinated. Pt also notes worsening GERD symptoms recently. Pt also complains that today he has not been able to walk due to weakness which he gets occasionally due to his GBS flares. Denies any urinary incontinence or bowel incontinence. Complains of urinary hesitancy, no dysuria.

## 2022-01-27 NOTE — CHART NOTE - NSCHARTNOTEFT_GEN_A_CORE
Updated neurology recommendations:  [] Neuro checks Q4HR.  [] MRI C-Spine and T-Spine, both w/wo contrast.  [] No need for NIF/VC.  [] No need for LP at this time.  [] ID following.  [] PT/OT evals.  [] Social Work consult.  [] Rest of care per primary team.    Case seen and discussed with neurology attending Dr. Wilks.

## 2022-01-27 NOTE — ED PROVIDER NOTE - CLINICAL SUMMARY MEDICAL DECISION MAKING FREE TEXT BOX
Pt is a 31 m with hx of HIV from birth, gbs who presents with chest pain for 2 days,  and inability to walk. Possible COVID with myalgia and cp vs gerd. Will get imaging and txt for GERD. Neuro consult for possible GBS flare vs cord compression.

## 2022-01-27 NOTE — PATIENT PROFILE ADULT - FALL HARM RISK - HARM RISK INTERVENTIONS

## 2022-01-27 NOTE — H&P ADULT - PROBLEM SELECTOR PLAN 1
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 Extensive prior work-up and imaging inconsistent with myasthenia gravis and GBS. Differential diagnosis to consider hypokalemia, however, unlikely to explain this worsening LE weakness for past few years. History and findings concerning for HIV myelopathy  - Potassium repleted in the ED  - Neurology following, appreciate recs  -- Obtain ID consult  -- f/u CBC w/ diff, CMP, ESR, glucose, A1c, TSH, T4, B12, MMA, homocysteine  -- NIFs and VC q4h   - Will speak with neurology regarding repeat MR and LP  - fall precaution  - PT/OT Patient with bilateral lower extremity weakness i/s/o congenital HIV. Extensive prior work-up and imaging inconsistent with myasthenia gravis and GBS. Differential diagnosis to consider hypokalemia, however, unlikely to explain this worsening LE weakness for past few years. History and findings concerning for HIV myelopathy  - Potassium repleted in the ED  - Neurology following, appreciate recs  - MR cervical and thoracic spine with and without IV contrast  -- f/u CBC w/ diff, CMP, ESR, glucose, A1c, TSH, T4, B12, MMA, homocysteine  -- NIFs and VC q4h   - ID consulted, appreciate recs  - fall precaution  - PT/OT

## 2022-01-27 NOTE — H&P ADULT - NSHPPHYSICALEXAM_GEN_ALL_CORE
VITAL SIGNS:  T(C): 36.4 (01-27-22 @ 11:49), Max: 36.7 (01-27-22 @ 01:33)  T(F): 97.6 (01-27-22 @ 11:49), Max: 98 (01-27-22 @ 01:33)  HR: 77 (01-27-22 @ 11:49) (77 - 85)  BP: 131/92 (01-27-22 @ 11:49) (127/97 - 147/91)  BP(mean): --  RR: 17 (01-27-22 @ 11:49) (0 - 17)  SpO2: 100% (01-27-22 @ 11:49) (98% - 100%)    PHYSICAL EXAM:    Constitutional: WDWN resting comfortably in bed; NAD  Head: NC/AT  Eyes: PERRL, EOMI, anicteric sclera  ENT: no oropharyngeal erythema or exudates; MMM  Neck: supple; no JVD  Respiratory: CTA B/L; no W/R/R, no retractions  Cardiac: +S1/S2; RRR; no M/R/G; PMI non-displaced  Gastrointestinal: soft, NT/ND; no rebound or guarding; +BSx4  Back: spine midline, no bony tenderness or step-offs; no CVAT B/L  Extremities: WWP, no clubbing or cyanosis; no peripheral edema. Capillary refill <2 sec  Musculoskeletal: ?????  Vascular: 2+ radial, DP/PT pulses B/L  Dermatologic: skin warm, dry and intact  Lymphatic: no submandibular or cervical LAD  Neurologic: AAOx3; CNII-XII grossly intact; no focal deficits  Psychiatric: affect and characteristics of appearance, verbalizations, behaviors are appropriate VITAL SIGNS:  T(C): 36.4 (01-27-22 @ 11:49), Max: 36.7 (01-27-22 @ 01:33)  T(F): 97.6 (01-27-22 @ 11:49), Max: 98 (01-27-22 @ 01:33)  HR: 77 (01-27-22 @ 11:49) (77 - 85)  BP: 131/92 (01-27-22 @ 11:49) (127/97 - 147/91)  BP(mean): --  RR: 17 (01-27-22 @ 11:49) (0 - 17)  SpO2: 100% (01-27-22 @ 11:49) (98% - 100%)    PHYSICAL EXAM:    Constitutional: WDWN resting comfortably in bed; NAD  Head: NC/AT  Eyes: PERRL, EOMI, anicteric sclera  ENT: MMM  Neck: supple;  Respiratory: CTA B/L; no W/R/R, no retractions  Cardiac: +S1/S2; RRR; no M/R/G  Gastrointestinal: soft, NT/ND; no rebound or guarding; +BSx4  Back: spine midline, no bony tenderness or step-offs; no CVAT B/L  Extremities: WWP, no clubbing or cyanosis; no peripheral edema. Capillary refill <2 sec  Musculoskeletal: Sensation and str intact in upper extremities. 3/5 str in LLE and 1-2/5 str in RLE. ROM is limited by back and leg pain in BLE. sensation intact in lower extremities  Vascular: 2+ radial, DP/PT pulses B/L  Dermatologic: skin warm, dry and intact  Lymphatic: no submandibular or cervical LAD  Neurologic: AAOx3; CNII-XII grossly intact; no focal deficits  Psychiatric: affect and characteristics of appearance, verbalizations, behaviors are appropriate

## 2022-01-27 NOTE — H&P ADULT - NSICDXPASTMEDICALHX_GEN_ALL_CORE_FT
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

## 2022-01-28 LAB
4/8 RATIO: 0.45 RATIO — LOW (ref 0.9–3.6)
ABS CD8: 578 /UL — SIGNIFICANT CHANGE UP (ref 142–740)
ALBUMIN SERPL ELPH-MCNC: 3.9 G/DL — SIGNIFICANT CHANGE UP (ref 3.3–5)
ALP SERPL-CCNC: 85 U/L — SIGNIFICANT CHANGE UP (ref 40–120)
ALT FLD-CCNC: 19 U/L — SIGNIFICANT CHANGE UP (ref 4–41)
ANION GAP SERPL CALC-SCNC: 14 MMOL/L — SIGNIFICANT CHANGE UP (ref 7–14)
AST SERPL-CCNC: 30 U/L — SIGNIFICANT CHANGE UP (ref 4–40)
BASOPHILS # BLD AUTO: 0.03 K/UL — SIGNIFICANT CHANGE UP (ref 0–0.2)
BASOPHILS NFR BLD AUTO: 0.8 % — SIGNIFICANT CHANGE UP (ref 0–2)
BILIRUB SERPL-MCNC: 0.6 MG/DL — SIGNIFICANT CHANGE UP (ref 0.2–1.2)
BUN SERPL-MCNC: 9 MG/DL — SIGNIFICANT CHANGE UP (ref 7–23)
CALCIUM SERPL-MCNC: 8.9 MG/DL — SIGNIFICANT CHANGE UP (ref 8.4–10.5)
CD3 BLASTS SPEC-ACNC: 73 % — SIGNIFICANT CHANGE UP (ref 59–83)
CD3 BLASTS SPEC-ACNC: 866 /UL — SIGNIFICANT CHANGE UP (ref 672–1870)
CD4 %: 22 % — LOW (ref 30–62)
CD8 %: 48 % — HIGH (ref 12–36)
CHLORIDE SERPL-SCNC: 101 MMOL/L — SIGNIFICANT CHANGE UP (ref 98–107)
CO2 SERPL-SCNC: 22 MMOL/L — SIGNIFICANT CHANGE UP (ref 22–31)
CREAT SERPL-MCNC: 0.91 MG/DL — SIGNIFICANT CHANGE UP (ref 0.5–1.3)
EOSINOPHIL # BLD AUTO: 0.26 K/UL — SIGNIFICANT CHANGE UP (ref 0–0.5)
EOSINOPHIL NFR BLD AUTO: 6.8 % — HIGH (ref 0–6)
GLUCOSE SERPL-MCNC: 88 MG/DL — SIGNIFICANT CHANGE UP (ref 70–99)
HCT VFR BLD CALC: 41.2 % — SIGNIFICANT CHANGE UP (ref 39–50)
HCV AB S/CO SERPL IA: 0.72 S/CO — SIGNIFICANT CHANGE UP (ref 0–0.99)
HCV AB SERPL-IMP: SIGNIFICANT CHANGE UP
HGB BLD-MCNC: 13.8 G/DL — SIGNIFICANT CHANGE UP (ref 13–17)
HIV-1 VIRAL LOAD RESULT: ABNORMAL
HIV1 RNA # SERPL NAA+PROBE: SIGNIFICANT CHANGE UP
HIV1 RNA SER-IMP: SIGNIFICANT CHANGE UP
HIV1 RNA SERPL NAA+PROBE-ACNC: ABNORMAL
HIV1 RNA SERPL NAA+PROBE-LOG#: 4.47 — SIGNIFICANT CHANGE UP
IANC: 1.22 K/UL — LOW (ref 1.5–8.5)
IMM GRANULOCYTES NFR BLD AUTO: 0 % — SIGNIFICANT CHANGE UP (ref 0–1.5)
LYMPHOCYTES # BLD AUTO: 1.83 K/UL — SIGNIFICANT CHANGE UP (ref 1–3.3)
LYMPHOCYTES # BLD AUTO: 48.2 % — HIGH (ref 13–44)
MAGNESIUM SERPL-MCNC: 1.5 MG/DL — LOW (ref 1.6–2.6)
MCHC RBC-ENTMCNC: 29.4 PG — SIGNIFICANT CHANGE UP (ref 27–34)
MCHC RBC-ENTMCNC: 33.5 GM/DL — SIGNIFICANT CHANGE UP (ref 32–36)
MCV RBC AUTO: 87.7 FL — SIGNIFICANT CHANGE UP (ref 80–100)
MONOCYTES # BLD AUTO: 0.46 K/UL — SIGNIFICANT CHANGE UP (ref 0–0.9)
MONOCYTES NFR BLD AUTO: 12.1 % — SIGNIFICANT CHANGE UP (ref 2–14)
NEUTROPHILS # BLD AUTO: 1.22 K/UL — LOW (ref 1.8–7.4)
NEUTROPHILS NFR BLD AUTO: 32.1 % — LOW (ref 43–77)
NRBC # BLD: 0 /100 WBCS — SIGNIFICANT CHANGE UP
NRBC # FLD: 0 K/UL — SIGNIFICANT CHANGE UP
PHOSPHATE SERPL-MCNC: 2.4 MG/DL — LOW (ref 2.5–4.5)
PLATELET # BLD AUTO: 217 K/UL — SIGNIFICANT CHANGE UP (ref 150–400)
POTASSIUM SERPL-MCNC: 3 MMOL/L — LOW (ref 3.5–5.3)
POTASSIUM SERPL-SCNC: 3 MMOL/L — LOW (ref 3.5–5.3)
PROT SERPL-MCNC: 7.1 G/DL — SIGNIFICANT CHANGE UP (ref 6–8.3)
RBC # BLD: 4.7 M/UL — SIGNIFICANT CHANGE UP (ref 4.2–5.8)
RBC # FLD: 12.4 % — SIGNIFICANT CHANGE UP (ref 10.3–14.5)
SODIUM SERPL-SCNC: 137 MMOL/L — SIGNIFICANT CHANGE UP (ref 135–145)
T-CELL CD4 SUBSET PNL BLD: 262 /UL — LOW (ref 489–1457)
WBC # BLD: 3.8 K/UL — SIGNIFICANT CHANGE UP (ref 3.8–10.5)
WBC # FLD AUTO: 3.8 K/UL — SIGNIFICANT CHANGE UP (ref 3.8–10.5)

## 2022-01-28 PROCEDURE — 99232 SBSQ HOSP IP/OBS MODERATE 35: CPT

## 2022-01-28 PROCEDURE — 99232 SBSQ HOSP IP/OBS MODERATE 35: CPT | Mod: GC

## 2022-01-28 RX ORDER — POTASSIUM CHLORIDE 20 MEQ
40 PACKET (EA) ORAL EVERY 4 HOURS
Refills: 0 | Status: COMPLETED | OUTPATIENT
Start: 2022-01-28 | End: 2022-01-28

## 2022-01-28 RX ORDER — SODIUM,POTASSIUM PHOSPHATES 278-250MG
1 POWDER IN PACKET (EA) ORAL ONCE
Refills: 0 | Status: COMPLETED | OUTPATIENT
Start: 2022-01-28 | End: 2022-01-29

## 2022-01-28 RX ORDER — DIAZEPAM 5 MG
2 TABLET ORAL ONCE
Refills: 0 | Status: DISCONTINUED | OUTPATIENT
Start: 2022-01-28 | End: 2022-01-28

## 2022-01-28 RX ORDER — MAGNESIUM SULFATE 500 MG/ML
1 VIAL (ML) INJECTION ONCE
Refills: 0 | Status: COMPLETED | OUTPATIENT
Start: 2022-01-28 | End: 2022-01-28

## 2022-01-28 RX ORDER — DIAZEPAM 5 MG
4 TABLET ORAL ONCE
Refills: 0 | Status: DISCONTINUED | OUTPATIENT
Start: 2022-01-28 | End: 2022-01-28

## 2022-01-28 RX ORDER — DIAZEPAM 5 MG
4 TABLET ORAL ONCE
Refills: 0 | Status: COMPLETED | OUTPATIENT
Start: 2022-01-28 | End: 2022-02-04

## 2022-01-28 RX ADMIN — Medication 40 MILLIEQUIVALENT(S): at 13:38

## 2022-01-28 RX ADMIN — GABAPENTIN 300 MILLIGRAM(S): 400 CAPSULE ORAL at 22:10

## 2022-01-28 RX ADMIN — Medication 40 MILLIEQUIVALENT(S): at 17:06

## 2022-01-28 RX ADMIN — Medication 3 MILLIGRAM(S): at 22:10

## 2022-01-28 RX ADMIN — QUETIAPINE FUMARATE 300 MILLIGRAM(S): 200 TABLET, FILM COATED ORAL at 22:10

## 2022-01-28 RX ADMIN — Medication 2 MILLIGRAM(S): at 17:06

## 2022-01-28 RX ADMIN — GABAPENTIN 300 MILLIGRAM(S): 400 CAPSULE ORAL at 05:26

## 2022-01-28 RX ADMIN — Medication 100 GRAM(S): at 13:38

## 2022-01-28 RX ADMIN — GABAPENTIN 300 MILLIGRAM(S): 400 CAPSULE ORAL at 13:38

## 2022-01-28 RX ADMIN — BICTEGRAVIR SODIUM, EMTRICITABINE, AND TENOFOVIR ALAFENAMIDE FUMARATE 1 TABLET(S): 30; 120; 15 TABLET ORAL at 11:45

## 2022-01-28 RX ADMIN — Medication 4 MILLIGRAM(S): at 18:06

## 2022-01-28 NOTE — PROGRESS NOTE ADULT - SUBJECTIVE AND OBJECTIVE BOX
**************************************  Jong Anaya, PGY-1  Pager: # [phone] / [phone]  Senior Resident: Jose Luis Bingham  After 7PM, please contact night float at #[phone] or [phone]  **************************************    INTERVAL HPI/OVERNIGHT EVENTS:  Patient was seen and examined at bedside. As per nurse and patient, no o/n events, patient resting comfortably. Continues to report LE weakness. Hasn't been unable ambulate    VITAL SIGNS:  T(F): 98.5 (01-28-22 @ 05:29)  HR: 80 (01-28-22 @ 05:29)  BP: 124/67 (01-28-22 @ 05:29)  RR: 16 (01-28-22 @ 05:29)  SpO2: 97% (01-28-22 @ 05:29)    PHYSICAL EXAM:    Constitutional: WDWN, NAD  HEENT: PERRL, EOMI, sclera non-icteric, MMM  Respiratory: CTA b/l, good air entry b/l, no wheezing; without accessory muscle use  Cardiovascular: RRR, normal S1S2, no M/R/G  Gastrointestinal: soft, NTND, no masses palpable, BS normal  Extremities: Musculoskeletal: Sensation and str intact in upper extremities. 3/5 str in LLE and 1-2/5 str in RLE. ROM is limited by back and leg pain in BLE. sensation intact in lower extremities  Neurological: AAOx3, CN Grossly intact  Skin: Normal temperature, warm, dry    MEDICATIONS  (STANDING):  bictegravir 50 mG/emtricitabine 200 mG/tenofovir alafenamide 25 mG (BIKTARVY) 1 Tablet(s) Oral daily  enoxaparin Injectable 40 milliGRAM(s) SubCutaneous daily  gabapentin 300 milliGRAM(s) Oral three times a day  influenza   Vaccine 0.5 milliLiter(s) IntraMuscular once  QUEtiapine 300 milliGRAM(s) Oral at bedtime    MEDICATIONS  (PRN):  acetaminophen     Tablet .. 650 milliGRAM(s) Oral every 6 hours PRN Temp greater or equal to 38C (100.4F), Mild Pain (1 - 3)  aluminum hydroxide/magnesium hydroxide/simethicone Suspension 30 milliLiter(s) Oral every 4 hours PRN Dyspepsia  melatonin 3 milliGRAM(s) Oral at bedtime PRN Insomnia      Allergies    Ceclor (Unknown)  Toradol (Anaphylaxis; Swelling)  Toradol (Swelling)    Intolerances        LABS:                        14.3   6.19  )-----------( 252      ( 27 Jan 2022 06:31 )             45.8     01-27    137  |  96<L>  |  15  ----------------------------<  68<L>  3.1<L>   |  24  |  0.97    Ca    9.0      27 Jan 2022 06:31    TPro  7.8  /  Alb  4.5  /  TBili  1.2  /  DBili  x   /  AST  36  /  ALT  22  /  AlkPhos  87  01-27          RADIOLOGY & ADDITIONAL TESTS:  Reviewed

## 2022-01-28 NOTE — PHYSICAL THERAPY INITIAL EVALUATION ADULT - ACTIVE RANGE OF MOTION EXAMINATION, REHAB EVAL
bilateral LE passive ROM WFL/bilateral upper extremity Active ROM was WFL (within functional limits)

## 2022-01-28 NOTE — PROVIDER CONTACT NOTE (OTHER) - SITUATION
Patient refused MRI because stated wants to be fully sedated before MRI. Was given valium however states was not enough

## 2022-01-28 NOTE — OCCUPATIONAL THERAPY INITIAL EVALUATION ADULT - PERTINENT HX OF CURRENT PROBLEM, REHAB EVAL
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

## 2022-01-28 NOTE — PROGRESS NOTE ADULT - ASSESSMENT
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

## 2022-01-28 NOTE — PROGRESS NOTE ADULT - SUBJECTIVE AND OBJECTIVE BOX
Follow Up:  HIV     Interval History/ROS: Patient is  still with B/L LE weakness    REVIEW OF SYSTEMS  [  ] ROS unobtainable because:    [ x ] All other systems negative except as noted below    Constitutional:  [ ] fever [ ] chills  [ ] weight loss  [ ]night sweat  [ ]poor appetite/PO intake [ ]fatigue   Skin:  [ ] rash [ ] phlebitis	  Eyes: [ ] icterus [ ] pain  [ ] discharge	  ENMT: [ ] sore throat  [ ] thrush [ ] ulcers [ ] exudates [ ]anosmia  Respiratory: [ ] dyspnea [ ] hemoptysis [ ] cough [ ] sputum	  Cardiovascular:  [ ] chest pain [ ] palpitations [ ] edema	  Gastrointestinal:  [ ] nausea [ ] vomiting [ ] diarrhea [ ] constipation [ ] pain	  Genitourinary:  [ ] dysuria [ ] frequency [ ] hematuria [ ] discharge [ ] flank pain  [ ] incontinence  Musculoskeletal:  [ ] myalgias [ ] arthralgias [ ] arthritis  [ ] back pain  Neurological:  [ ] headache [ ] weakness [ ] seizures  [ ] confusion/altered mental status    Allergies  Ceclor (Unknown)  Toradol (Anaphylaxis; Swelling)  Toradol (Swelling)        ANTIMICROBIALS:    bictegravir 50 mG/emtricitabine 200 mG/tenofovir alafenamide 25 mG (BIKTARVY) 1 daily        OTHER MEDS: MEDICATIONS  (STANDING):  acetaminophen     Tablet .. 650 every 6 hours PRN  aluminum hydroxide/magnesium hydroxide/simethicone Suspension 30 every 4 hours PRN  enoxaparin Injectable 40 daily  gabapentin 300 three times a day  influenza   Vaccine 0.5 once  melatonin 3 at bedtime PRN  QUEtiapine 300 at bedtime      Vital Signs Last 24 Hrs  T(F): 98.5 (01-28-22 @ 05:29), Max: 98.5 (01-28-22 @ 05:29)    Vital Signs Last 24 Hrs  HR: 80 (01-28-22 @ 05:29) (74 - 80)  BP: 124/67 (01-28-22 @ 05:29) (124/67 - 132/79)  RR: 16 (01-28-22 @ 05:29)  SpO2: 97% (01-28-22 @ 05:29) (97% - 100%)  Wt(kg): --    EXAM:    General:  non-toxic, AOx3  HEAD/EYES: PERRL, white sclera   ENT:  normal, No thrush and no pharyngeal exudate  Neck: Supple  Cardiovascular:   No murmur,  normal S1 and S2  Respiratory: clear to ausculation bilaterally  GI:  soft, non-tender, normal bowel sounds  : no gonzales, no CVA tenderness   Musculoskeletal:  no synovitis  Neurologic: BLE flaccid paralysis (left worse than right) with allodynia and paresthesia  Skin: no rash  Lymph:  no lymphadenopathy  Psychiatric: Cooperative, appropriate affect, alert & oriented  Lines:  no phlebitis       Labs:                        13.8   3.80  )-----------( 217      ( 28 Jan 2022 08:11 )             41.2     01-28    137  |  101  |  9   ----------------------------<  88  3.0<L>   |  22  |  0.91    Ca    8.9      28 Jan 2022 08:13  Phos  2.4     01-28  Mg     1.50     01-28    TPro  7.1  /  Alb  3.9  /  TBili  0.6  /  DBili  x   /  AST  30  /  ALT  19  /  AlkPhos  85  01-28      WBC Trend:  WBC Count: 3.80 (01-28-22 @ 08:11)  WBC Count: 6.19 (01-27-22 @ 06:31)      Creatine Trend:  Creatinine, Serum: 0.91 (01-28)  Creatinine, Serum: 0.97 (01-27)      Liver Biochemical Testing Trend:  Alanine Aminotransferase (ALT/SGPT): 19 (01-28)  Alanine Aminotransferase (ALT/SGPT): 22 (01-27)  Alanine Aminotransferase (ALT/SGPT): 15 (09-14)  Alanine Aminotransferase (ALT/SGPT): 20 (09-09)  Alanine Aminotransferase (ALT/SGPT): 10 (06-07)  Aspartate Aminotransferase (AST/SGOT): 30 (01-28-22 @ 08:13)  Aspartate Aminotransferase (AST/SGOT): 36 (01-27-22 @ 06:31)  Aspartate Aminotransferase (AST/SGOT): 19 (09-14-21 @ 07:52)  Aspartate Aminotransferase (AST/SGOT): 51 (09-09-21 @ 19:28)  Aspartate Aminotransferase (AST/SGOT): 16 (06-07-21 @ 07:24)  Bilirubin Total, Serum: 0.6 (01-28)  Bilirubin Total, Serum: 1.2 (01-27)  Bilirubin Total, Serum: 0.4 (09-14)  Bilirubin Total, Serum: 0.6 (09-09)  Bilirubin Total, Serum: 0.2 (06-07)      Trend LDH          MICROBIOLOGY:        Culture - Blood (collected 05 Jun 2021 15:02)  Source: .Blood Blood-Venous  Final Report:    No Growth Final    Culture - Blood (collected 05 Jun 2021 15:00)  Source: .Blood Blood-Peripheral  Final Report:    No Growth Final    Culture - Blood (collected 20 May 2021 18:01)  Source: .Blood Blood  Final Report:    No growth at 5 days.    Culture - Blood (collected 20 May 2021 17:59)  Source: .Blood Blood  Final Report:    No growth at 5 days.    Culture - Fungal, CSF (collected 02 May 2021 15:52)  Source: .CSF  Final Report:    No fungus isolated after 4 weeks.    Culture - CSF with Gram Stain (collected 02 May 2021 15:52)  Source: .CSF tube#2  Final Report:    No growth    Culture - Blood (collected 02 May 2021 06:03)  Source: .Blood None  Final Report:    No Growth Final    Culture - Blood (collected 02 May 2021 06:03)  Source: .Blood None  Final Report:    No Growth Final    Culture - Urine (collected 26 Oct 2020 05:28)  Source: .Urine Catheterized  Final Report:    <10,000 CFU/mL Normal Urogenital Jeimy    Culture - Blood (collected 25 Oct 2020 21:46)  Source: .Blood Blood  Final Report:    No Growth Final        ABS CD4: 262 /uL (01-28-22 @ 08:46)  ABS CD4: 322 /uL (09-13-21 @ 18:23)  ABS CD4: 340 /uL (09-13-21 @ 14:06)  ABS CD4: 318 /uL (10-26-20 @ 17:29)  ABS CD4: 144 /uL (10-13-20 @ 07:14)    HIV-1 Viral Load Result: DET. (06-05-21 @ 20:10)  HIV-1 RNA Quantitative, Viral Load: 7,525 (06-05-21 @ 20:10)  HIV-1 Viral Load Result: DET. (05-02-21 @ 06:03)  HIV-1 RNA Quantitative, Viral Load: 37,671 (05-02-21 @ 06:03)  HIV-1 RNA Quantitative, Viral Load: 75,567 (10-25-20 @ 21:28)      COVID-19 [place] AB Interp: Positive (09-10-21 @ 13:19)  COVID-19 [place] AB Interp: Positive (06-05-21 @ 21:13)  COVID-19 [place] AB Interp: Positive (05-21-21 @ 09:55)      Sedimentation Rate, Erythrocyte: 16 mm/hr (01-27-22 @ 14:37)  Sedimentation Rate, Erythrocyte: 9 mm/hr (09-12-21 @ 07:01)  Sedimentation Rate, Erythrocyte: 25 mm/hr (06-05-21 @ 15:23)  Sedimentation Rate, Erythrocyte: 28 mm/hr (05-20-21 @ 18:02)  Sedimentation Rate, Erythrocyte: 28 mm/hr (05-02-21 @ 17:05)      RADIOLOGY:  imaging below personally reviewed                     Follow Up:  HIV     Interval History/ROS: Patient is  still with B/L LE weakness. Denies any new symptoms    REVIEW OF SYSTEMS  [  ] ROS unobtainable because:    [ x ] All other systems negative except as noted below    Constitutional:  [ ] fever [ ] chills  [ ] weight loss  [ ]night sweat  [ ]poor appetite/PO intake [ ]fatigue   Skin:  [ ] rash [ ] phlebitis	  Eyes: [ ] icterus [ ] pain  [ ] discharge	  ENMT: [ ] sore throat  [ ] thrush [ ] ulcers [ ] exudates [ ]anosmia  Respiratory: [ ] dyspnea [ ] hemoptysis [ ] cough [ ] sputum	  Cardiovascular:  [ ] chest pain [ ] palpitations [ ] edema	  Gastrointestinal:  [ ] nausea [ ] vomiting [ ] diarrhea [ ] constipation [ ] pain	  Genitourinary:  [ ] dysuria [ ] frequency [ ] hematuria [ ] discharge [ ] flank pain  [ ] incontinence  Musculoskeletal:  [ ] myalgias [ ] arthralgias [ ] arthritis  [ ] back pain  Neurological:  [ ] headache [ ] weakness [ ] seizures  [ ] confusion/altered mental status    Allergies  Ceclor (Unknown)  Toradol (Anaphylaxis; Swelling)  Toradol (Swelling)        ANTIMICROBIALS:    bictegravir 50 mG/emtricitabine 200 mG/tenofovir alafenamide 25 mG (BIKTARVY) 1 daily        OTHER MEDS: MEDICATIONS  (STANDING):  acetaminophen     Tablet .. 650 every 6 hours PRN  aluminum hydroxide/magnesium hydroxide/simethicone Suspension 30 every 4 hours PRN  enoxaparin Injectable 40 daily  gabapentin 300 three times a day  influenza   Vaccine 0.5 once  melatonin 3 at bedtime PRN  QUEtiapine 300 at bedtime      Vital Signs Last 24 Hrs  T(F): 98.5 (01-28-22 @ 05:29), Max: 98.5 (01-28-22 @ 05:29)    Vital Signs Last 24 Hrs  HR: 80 (01-28-22 @ 05:29) (74 - 80)  BP: 124/67 (01-28-22 @ 05:29) (124/67 - 132/79)  RR: 16 (01-28-22 @ 05:29)  SpO2: 97% (01-28-22 @ 05:29) (97% - 100%)  Wt(kg): --    EXAM:    General:  non-toxic, AOx3  HEAD/EYES: PERRL, white sclera   ENT:  normal, No thrush and no pharyngeal exudate  Neck: Supple  Cardiovascular:   No murmur,  normal S1 and S2  Respiratory: clear to ausculation bilaterally  GI:  soft, non-tender, normal bowel sounds  : no gonzales, no CVA tenderness   Musculoskeletal:  no synovitis  Neurologic: BLE flaccid paralysis (left worse than right) with allodynia and paresthesia  Skin: no rash  Lymph:  no lymphadenopathy  Psychiatric: Cooperative, appropriate affect, alert & oriented  Lines:  no phlebitis       Labs:                        13.8   3.80  )-----------( 217      ( 28 Jan 2022 08:11 )             41.2     01-28    137  |  101  |  9   ----------------------------<  88  3.0<L>   |  22  |  0.91    Ca    8.9      28 Jan 2022 08:13  Phos  2.4     01-28  Mg     1.50     01-28    TPro  7.1  /  Alb  3.9  /  TBili  0.6  /  DBili  x   /  AST  30  /  ALT  19  /  AlkPhos  85  01-28      WBC Trend:  WBC Count: 3.80 (01-28-22 @ 08:11)  WBC Count: 6.19 (01-27-22 @ 06:31)      Creatine Trend:  Creatinine, Serum: 0.91 (01-28)  Creatinine, Serum: 0.97 (01-27)      Liver Biochemical Testing Trend:  Alanine Aminotransferase (ALT/SGPT): 19 (01-28)  Alanine Aminotransferase (ALT/SGPT): 22 (01-27)  Alanine Aminotransferase (ALT/SGPT): 15 (09-14)  Alanine Aminotransferase (ALT/SGPT): 20 (09-09)  Alanine Aminotransferase (ALT/SGPT): 10 (06-07)  Aspartate Aminotransferase (AST/SGOT): 30 (01-28-22 @ 08:13)  Aspartate Aminotransferase (AST/SGOT): 36 (01-27-22 @ 06:31)  Aspartate Aminotransferase (AST/SGOT): 19 (09-14-21 @ 07:52)  Aspartate Aminotransferase (AST/SGOT): 51 (09-09-21 @ 19:28)  Aspartate Aminotransferase (AST/SGOT): 16 (06-07-21 @ 07:24)  Bilirubin Total, Serum: 0.6 (01-28)  Bilirubin Total, Serum: 1.2 (01-27)  Bilirubin Total, Serum: 0.4 (09-14)  Bilirubin Total, Serum: 0.6 (09-09)  Bilirubin Total, Serum: 0.2 (06-07)      Trend LDH          MICROBIOLOGY:        Culture - Blood (collected 05 Jun 2021 15:02)  Source: .Blood Blood-Venous  Final Report:    No Growth Final    Culture - Blood (collected 05 Jun 2021 15:00)  Source: .Blood Blood-Peripheral  Final Report:    No Growth Final    Culture - Blood (collected 20 May 2021 18:01)  Source: .Blood Blood  Final Report:    No growth at 5 days.    Culture - Blood (collected 20 May 2021 17:59)  Source: .Blood Blood  Final Report:    No growth at 5 days.    Culture - Fungal, CSF (collected 02 May 2021 15:52)  Source: .CSF  Final Report:    No fungus isolated after 4 weeks.    Culture - CSF with Gram Stain (collected 02 May 2021 15:52)  Source: .CSF tube#2  Final Report:    No growth    Culture - Blood (collected 02 May 2021 06:03)  Source: .Blood None  Final Report:    No Growth Final    Culture - Blood (collected 02 May 2021 06:03)  Source: .Blood None  Final Report:    No Growth Final    Culture - Urine (collected 26 Oct 2020 05:28)  Source: .Urine Catheterized  Final Report:    <10,000 CFU/mL Normal Urogenital Jeimy    Culture - Blood (collected 25 Oct 2020 21:46)  Source: .Blood Blood  Final Report:    No Growth Final        ABS CD4: 262 /uL (01-28-22 @ 08:46)  ABS CD4: 322 /uL (09-13-21 @ 18:23)  ABS CD4: 340 /uL (09-13-21 @ 14:06)  ABS CD4: 318 /uL (10-26-20 @ 17:29)  ABS CD4: 144 /uL (10-13-20 @ 07:14)    HIV-1 Viral Load Result: DET. (06-05-21 @ 20:10)  HIV-1 RNA Quantitative, Viral Load: 7,525 (06-05-21 @ 20:10)  HIV-1 Viral Load Result: DET. (05-02-21 @ 06:03)  HIV-1 RNA Quantitative, Viral Load: 37,671 (05-02-21 @ 06:03)  HIV-1 RNA Quantitative, Viral Load: 75,567 (10-25-20 @ 21:28)      COVID-19 [place] AB Interp: Positive (09-10-21 @ 13:19)  COVID-19 [place] AB Interp: Positive (06-05-21 @ 21:13)  COVID-19 [place] AB Interp: Positive (05-21-21 @ 09:55)      Sedimentation Rate, Erythrocyte: 16 mm/hr (01-27-22 @ 14:37)  Sedimentation Rate, Erythrocyte: 9 mm/hr (09-12-21 @ 07:01)  Sedimentation Rate, Erythrocyte: 25 mm/hr (06-05-21 @ 15:23)  Sedimentation Rate, Erythrocyte: 28 mm/hr (05-20-21 @ 18:02)  Sedimentation Rate, Erythrocyte: 28 mm/hr (05-02-21 @ 17:05)      RADIOLOGY:  imaging below personally reviewed

## 2022-01-28 NOTE — PHYSICAL THERAPY INITIAL EVALUATION ADULT - DISCHARGE DISPOSITION, PT EVAL
Anticipate Acute Rehab to improve functional mobility and strength and to return to baseline functional status.

## 2022-01-28 NOTE — PHYSICAL THERAPY INITIAL EVALUATION ADULT - PERTINENT HX OF CURRENT PROBLEM, REHAB EVAL
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 Patient also reports 7/10 low back and thigh and calf pain; describes it as sharp and burning at time. Patient has had repeated MR cervical/thoracic/lumbar spine in the past without any significant findings. 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

## 2022-01-28 NOTE — PROGRESS NOTE ADULT - ASSESSMENT
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  Pt reports his weakness is all in B/L LE Lt>Rt. It started in december 2017 and is episodic. He reports he gets weak for a few weeks and is unable to ambulate. It is associated with significant allodynia and paresthesia as per pt. He reports improvement after a few weeks with PT each times.  Patient states that he was diagnosed with GBS in 2018 at [place] after he presented there for bilateral LE weakness; although patient had an LP at that time and was told the results were negative. Since then, pt has had recurrent episodes of worsening BLE weakness with intermittent improvement between these episodes. Patient states that there are days he is able to walk unassisted, but otherwise uses a cane.  Patient has been seen in the hospital multiple times, most recently in 9/2021. Patient has had repeated MR cervical/thoracic/lumbar spine in the past without any significant findings. Patient was seen by [facility] (Dr. Wilks) in 9/2021. It was assessed that his history and exam were inconsistent with GBS and more consistent with HIV myelopathy.  He was evaluated by ID during admission in 6/21 for LE weakenss when he was noted to be Uncontrolled HIV, undomiciled, does not follow with an HIV specialist at this time and Off ARTs for about two months. He had HCV weakly reactive, Hep C RNA PCR negative, CSF analysis was negative for infection, lyme serum and csf negative and west nile virus serology negative, quant gold negative. His prior drug screens have been consistently positive for Cocaine, THC and opiates, however he only endorses to use of THC.  He was started on biktarvy at that visit, however hasnt followed up with an ID provider since then. Pt has one female sexual partner, uses condoms consistently. No hx of any oppurtunistic infections or other STIs as per pt. He does note that a few years back his CD4 count was below 200. He reports compliance with ART and reports he doesnt remember CD4 count from last month, but his HIV VL was negative last month. Patient admitted to medicine for further neurological management and ID asked to comment on possiblilty for HIV myelopathy.    HIV VL: 7,525 (06-05-21)  CD4: 322 /uL (09-13/21) ->  CD4: 262      IMPRESSION  ·	HIV   ·	B/L LE Paraperesis     RECOMMENDATION  Main complaint is BLE flaccid paralysis (left worse than right) with allodynia and paresthesia. No bowel or bladder sphincter involvement  c/w biktarvy  Will f/u CD4 and  Hep C ab  Please send Hep C RNA PCR to r/o reinfection  Unclear if can correlate pt's neurological symptoms to HIV.   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  Pt reports his weakness is all in B/L LE Lt>Rt. It started in december 2017 and is episodic. He reports he gets weak for a few weeks and is unable to ambulate. It is associated with significant allodynia and paresthesia as per pt. He reports improvement after a few weeks with PT each times.  Patient states that he was diagnosed with GBS in 2018 at [place] after he presented there for bilateral LE weakness; although patient had an LP at that time and was told the results were negative. Since then, pt has had recurrent episodes of worsening BLE weakness with intermittent improvement between these episodes. Patient states that there are days he is able to walk unassisted, but otherwise uses a cane.  Patient has been seen in the hospital multiple times, most recently in 9/2021. Patient has had repeated MR cervical/thoracic/lumbar spine in the past without any significant findings. Patient was seen by [facility] Neurology (Dr. Wilks) in 9/2021. It was assessed that his history and exam were inconsistent with GBS and more consistent with HIV myelopathy.  He was evaluated by ID during admission in 6/21 for LE weakenss when he was noted to be Uncontrolled HIV, undomiciled, does not follow with an HIV specialist at this time and Off ARTs for about two months. He had HCV weakly reactive, Hep C RNA PCR negative, CSF analysis was negative for infection, lyme serum and csf negative and west nile virus serology negative, quant gold negative. His prior drug screens have been consistently positive for Cocaine, THC and opiates, however he only endorses to use of THC.  He was started on biktarvy at that visit, however hasnt followed up with an ID provider since then. Pt has one female sexual partner, uses condoms consistently. No hx of any oppurtunistic infections or other STIs as per pt. He does note that a few years back his CD4 count was below 200. He reports compliance with ART and reports he doesnt remember CD4 count from last month, but his HIV VL was negative last month. Patient admitted to medicine for further neurological management and ID asked to comment on possiblilty for HIV myelopathy.    HIV VL: 7,525 (06-05-21)  CD4: 322 /uL (09-13/21) ->  CD4: 262      IMPRESSION  ·	HIV   ·	B/L LE Paraperesis     RECOMMENDATION  Main complaint is BLE flaccid paralysis (left worse than right) with allodynia and paresthesia. No bowel or bladder sphincter involvement  c/w biktarvy  Will f/u Viral load and  Hep C ab  Please send Hep C RNA PCR to r/o reinfection  Unclear if can correlate pt's neurological symptoms to HIV.   HIV myelopathy is more of a diagnosis of exclusion in Late stage AIDs patient and doesn't completely fit pt's symptomology  Agree with continuing workup as per neuro  MRI C-Spine and T-Spine, both w/wo contrast recommend by neuro -> Agree    Pt seen and examined.    Kahlil Montoya MD, PGY4   ID fellow  [employer] Teams Preferred/ Pager: [phone]  [employer] pager ID: [phone] (would prefer to text page for any new consult or question, please include name/location and best call back number)  After 5pm/weekends call [phone]

## 2022-01-28 NOTE — PROGRESS NOTE ADULT - PROBLEM SELECTOR PLAN 3
Patient with hypokalemia to 3.1. unclear etiology, no medications that can cause. Potentially decrease PO intake given he is not domiciled  - s/p 40 potassium chloride. Will give another 40 x2   - Repeat BMP in AM  - Replete K<3.5

## 2022-01-28 NOTE — OCCUPATIONAL THERAPY INITIAL EVALUATION ADULT - LEVEL OF INDEPENDENCE:TOILET, OT EVAL
pt unable to perform sit to stand at this time 2/2 bilateral LE weakness.. Supine in bed assist level: minimum assistance x1/maximum assist (25% patients effort)

## 2022-01-28 NOTE — PROGRESS NOTE ADULT - PROBLEM SELECTOR PLAN 4
Reported hx of GBS at [facility] in 2018. However, history and work-up inconsistent. On home baclofen and gabapentin, reportedly helps  - Can restart gabapentin, will speak with neuro

## 2022-01-28 NOTE — PROGRESS NOTE ADULT - PROBLEM SELECTOR PLAN 1
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 Extensive prior work-up and imaging inconsistent with myasthenia gravis and GBS. Differential diagnosis to consider hypokalemia, however, unlikely to explain this worsening LE weakness for past few years. History and findings concerning for HIV myelopathy  - Potassium repleted in the ED  - Neurology following, appreciate recs  - MR cervical and thoracic spine with and without IV contrast  -- f/u CBC w/ diff, CMP, ESR, glucose, A1c, TSH, T4, B12, MMA, homocysteine  - ID consulted, appreciate recs  -- f/u Hep C labs  - fall precaution  - PT/OT

## 2022-01-28 NOTE — PHYSICAL THERAPY INITIAL EVALUATION ADULT - GROSSLY INTACT, SENSORY
patient reports "burning sensation" in bilateral feet/Grossly Intact patient reports chronic "burning sensation" in bilateral feet/Grossly Intact

## 2022-01-29 LAB
ALBUMIN SERPL ELPH-MCNC: 3.8 G/DL — SIGNIFICANT CHANGE UP (ref 3.3–5)
ALP SERPL-CCNC: 86 U/L — SIGNIFICANT CHANGE UP (ref 40–120)
ALT FLD-CCNC: 18 U/L — SIGNIFICANT CHANGE UP (ref 4–41)
ANION GAP SERPL CALC-SCNC: 12 MMOL/L — SIGNIFICANT CHANGE UP (ref 7–14)
AST SERPL-CCNC: 24 U/L — SIGNIFICANT CHANGE UP (ref 4–40)
BASOPHILS # BLD AUTO: 0.04 K/UL — SIGNIFICANT CHANGE UP (ref 0–0.2)
BASOPHILS NFR BLD AUTO: 0.6 % — SIGNIFICANT CHANGE UP (ref 0–2)
BILIRUB SERPL-MCNC: 0.6 MG/DL — SIGNIFICANT CHANGE UP (ref 0.2–1.2)
BUN SERPL-MCNC: 11 MG/DL — SIGNIFICANT CHANGE UP (ref 7–23)
CALCIUM SERPL-MCNC: 9 MG/DL — SIGNIFICANT CHANGE UP (ref 8.4–10.5)
CHLORIDE SERPL-SCNC: 101 MMOL/L — SIGNIFICANT CHANGE UP (ref 98–107)
CO2 SERPL-SCNC: 22 MMOL/L — SIGNIFICANT CHANGE UP (ref 22–31)
CREAT SERPL-MCNC: 0.99 MG/DL — SIGNIFICANT CHANGE UP (ref 0.5–1.3)
EOSINOPHIL # BLD AUTO: 0.29 K/UL — SIGNIFICANT CHANGE UP (ref 0–0.5)
EOSINOPHIL NFR BLD AUTO: 4.3 % — SIGNIFICANT CHANGE UP (ref 0–6)
GLUCOSE SERPL-MCNC: 85 MG/DL — SIGNIFICANT CHANGE UP (ref 70–99)
HCT VFR BLD CALC: 40.6 % — SIGNIFICANT CHANGE UP (ref 39–50)
HGB BLD-MCNC: 13 G/DL — SIGNIFICANT CHANGE UP (ref 13–17)
IANC: 3.58 K/UL — SIGNIFICANT CHANGE UP (ref 1.5–8.5)
IMM GRANULOCYTES NFR BLD AUTO: 0.1 % — SIGNIFICANT CHANGE UP (ref 0–1.5)
LYMPHOCYTES # BLD AUTO: 1.94 K/UL — SIGNIFICANT CHANGE UP (ref 1–3.3)
LYMPHOCYTES # BLD AUTO: 28.6 % — SIGNIFICANT CHANGE UP (ref 13–44)
MAGNESIUM SERPL-MCNC: 1.4 MG/DL — LOW (ref 1.6–2.6)
MCHC RBC-ENTMCNC: 28.4 PG — SIGNIFICANT CHANGE UP (ref 27–34)
MCHC RBC-ENTMCNC: 32 GM/DL — SIGNIFICANT CHANGE UP (ref 32–36)
MCV RBC AUTO: 88.8 FL — SIGNIFICANT CHANGE UP (ref 80–100)
MONOCYTES # BLD AUTO: 0.92 K/UL — HIGH (ref 0–0.9)
MONOCYTES NFR BLD AUTO: 13.6 % — SIGNIFICANT CHANGE UP (ref 2–14)
NEUTROPHILS # BLD AUTO: 3.58 K/UL — SIGNIFICANT CHANGE UP (ref 1.8–7.4)
NEUTROPHILS NFR BLD AUTO: 52.8 % — SIGNIFICANT CHANGE UP (ref 43–77)
NRBC # BLD: 0 /100 WBCS — SIGNIFICANT CHANGE UP
NRBC # FLD: 0 K/UL — SIGNIFICANT CHANGE UP
PHOSPHATE SERPL-MCNC: 1.4 MG/DL — LOW (ref 2.5–4.5)
PLATELET # BLD AUTO: 216 K/UL — SIGNIFICANT CHANGE UP (ref 150–400)
POTASSIUM SERPL-MCNC: 4.1 MMOL/L — SIGNIFICANT CHANGE UP (ref 3.5–5.3)
POTASSIUM SERPL-SCNC: 4.1 MMOL/L — SIGNIFICANT CHANGE UP (ref 3.5–5.3)
PROT SERPL-MCNC: 6.9 G/DL — SIGNIFICANT CHANGE UP (ref 6–8.3)
RBC # BLD: 4.57 M/UL — SIGNIFICANT CHANGE UP (ref 4.2–5.8)
RBC # FLD: 12.6 % — SIGNIFICANT CHANGE UP (ref 10.3–14.5)
SODIUM SERPL-SCNC: 135 MMOL/L — SIGNIFICANT CHANGE UP (ref 135–145)
WBC # BLD: 6.78 K/UL — SIGNIFICANT CHANGE UP (ref 3.8–10.5)
WBC # FLD AUTO: 6.78 K/UL — SIGNIFICANT CHANGE UP (ref 3.8–10.5)

## 2022-01-29 PROCEDURE — 99232 SBSQ HOSP IP/OBS MODERATE 35: CPT | Mod: GC

## 2022-01-29 RX ORDER — SODIUM,POTASSIUM PHOSPHATES 278-250MG
1 POWDER IN PACKET (EA) ORAL ONCE
Refills: 0 | Status: COMPLETED | OUTPATIENT
Start: 2022-01-29 | End: 2022-01-29

## 2022-01-29 RX ORDER — MAGNESIUM SULFATE 500 MG/ML
1 VIAL (ML) INJECTION ONCE
Refills: 0 | Status: COMPLETED | OUTPATIENT
Start: 2022-01-29 | End: 2022-01-29

## 2022-01-29 RX ADMIN — Medication 30 MILLILITER(S): at 02:07

## 2022-01-29 RX ADMIN — BICTEGRAVIR SODIUM, EMTRICITABINE, AND TENOFOVIR ALAFENAMIDE FUMARATE 1 TABLET(S): 30; 120; 15 TABLET ORAL at 11:40

## 2022-01-29 RX ADMIN — GABAPENTIN 300 MILLIGRAM(S): 400 CAPSULE ORAL at 21:07

## 2022-01-29 RX ADMIN — GABAPENTIN 300 MILLIGRAM(S): 400 CAPSULE ORAL at 05:13

## 2022-01-29 RX ADMIN — Medication 1 PACKET(S): at 09:31

## 2022-01-29 RX ADMIN — Medication 100 GRAM(S): at 09:32

## 2022-01-29 RX ADMIN — Medication 1 PACKET(S): at 17:12

## 2022-01-29 RX ADMIN — QUETIAPINE FUMARATE 300 MILLIGRAM(S): 200 TABLET, FILM COATED ORAL at 21:07

## 2022-01-29 RX ADMIN — GABAPENTIN 300 MILLIGRAM(S): 400 CAPSULE ORAL at 13:32

## 2022-01-29 RX ADMIN — Medication 3 MILLIGRAM(S): at 21:07

## 2022-01-29 RX ADMIN — ENOXAPARIN SODIUM 40 MILLIGRAM(S): 100 INJECTION SUBCUTANEOUS at 11:40

## 2022-01-29 NOTE — PROGRESS NOTE ADULT - ASSESSMENT
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

## 2022-01-29 NOTE — PROGRESS NOTE ADULT - SUBJECTIVE AND OBJECTIVE BOX
**************************************  Jong Anaya, PGY-1  Pager: # [phone] / [phone]  Senior Resident: Jose Luis Bingham  After 7PM, please contact night float at #[phone] or [phone]  **************************************    INTERVAL HPI/OVERNIGHT EVENTS:  Patient was seen and examined at bedside. As per nurse and patient, no o/n events, patient resting comfortably. Continues to report LE weakness. Patient refused MR last night. Patient was offered more valium, but still felt uneasy being in the MR. Did not feel ready for it yesterday. Hasn't been unable ambulate    VITAL SIGNS:  T(F): 99.6 (01-29-22 @ 05:17)  HR: 96 (01-29-22 @ 05:17)  BP: 127/63 (01-29-22 @ 05:17)  RR: 16 (01-29-22 @ 05:17)  SpO2: 100% (01-29-22 @ 05:17)      PHYSICAL EXAM:    Constitutional: WDWN, NAD  HEENT: PERRL, EOMI, sclera non-icteric, MMM  Respiratory: CTA b/l, good air entry b/l, no wheezing; without accessory muscle use  Cardiovascular: RRR, normal S1S2, no M/R/G  Gastrointestinal: soft, NTND, no masses palpable, BS normal  Extremities: Musculoskeletal: Sensation and str intact in upper extremities. 3/5 str in LLE and 1-2/5 str in RLE. ROM is limited by back and leg pain in BLE. sensation intact in lower extremities  Neurological: AAOx3, CN Grossly intact  Skin: Normal temperature, warm, dry    MEDICATIONS  (STANDING):  bictegravir 50 mG/emtricitabine 200 mG/tenofovir alafenamide 25 mG (BIKTARVY) 1 Tablet(s) Oral daily  enoxaparin Injectable 40 milliGRAM(s) SubCutaneous daily  gabapentin 300 milliGRAM(s) Oral three times a day  influenza   Vaccine 0.5 milliLiter(s) IntraMuscular once  potassium phosphate / sodium phosphate Powder (PHOS-NaK) 1 Packet(s) Oral once  QUEtiapine 300 milliGRAM(s) Oral at bedtime    MEDICATIONS  (PRN):  acetaminophen     Tablet .. 650 milliGRAM(s) Oral every 6 hours PRN Temp greater or equal to 38C (100.4F), Mild Pain (1 - 3)  aluminum hydroxide/magnesium hydroxide/simethicone Suspension 30 milliLiter(s) Oral every 4 hours PRN Dyspepsia  melatonin 3 milliGRAM(s) Oral at bedtime PRN Insomnia      Allergies    Ceclor (Unknown)  Toradol (Anaphylaxis; Swelling)  Toradol (Swelling)    Intolerances        LABS:                        13.8   3.80  )-----------( 217      ( 28 Jan 2022 08:11 )             41.2     01-28    137  |  101  |  9   ----------------------------<  88  3.0<L>   |  22  |  0.91    Ca    8.9      28 Jan 2022 08:13  Phos  2.4     01-28  Mg     1.50     01-28    TPro  7.1  /  Alb  3.9  /  TBili  0.6  /  DBili  x   /  AST  30  /  ALT  19  /  AlkPhos  85  01-28          RADIOLOGY & ADDITIONAL TESTS:  Reviewed

## 2022-01-29 NOTE — PROGRESS NOTE ADULT - PROBLEM SELECTOR PLAN 4
Reported hx of GBS at [facility] in 2018. However, history and work-up inconsistent. On home baclofen and gabapentin, reportedly helps  - Can restart gabapentin, will speak with neuro

## 2022-01-29 NOTE — PROGRESS NOTE ADULT - PROBLEM SELECTOR PLAN 1
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 Extensive prior work-up and imaging inconsistent with myasthenia gravis and GBS. Differential diagnosis to consider hypokalemia, however, unlikely to explain this worsening LE weakness for past few years. 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 Patient refused to go last night. Offered higher dose of Valium but patient still refused  -- f/u CBC w/ diff, CMP, ESR, glucose, A1c, TSH, T4, B12, MMA, homocysteine  - ID consulted, appreciate recs  -- f/u Hep C labs  - fall precaution  - PT/OT  - There may be psychogenic/malingering component to his weakness. 1/28 in the AM, patient was moving his lower extremity when sleeping/ about to waking up. 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 Extensive prior work-up and imaging inconsistent with myasthenia gravis and GBS. Differential diagnosis to consider hypokalemia, however, unlikely to explain this worsening LE weakness for past few years. 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 Patient refused to go last night as claustrophobic even with valium. Will give ativan 1mg   -- f/u CBC w/ diff, CMP, ESR, glucose, A1c, TSH, T4, B12, MMA, homocysteine  - ID consulted, appreciate recs  -- f/u Hep C labs  - fall precaution  - PT/OT  - There may be psychogenic/malingering component to his weakness. 1/28 in the AM, patient was moving his lower extremity when sleeping/ about to waking up. During my exam, he was not able move the extremity in the same manner

## 2022-01-29 NOTE — PROGRESS NOTE ADULT - PROBLEM SELECTOR PLAN 3
Patient with hypokalemia to 3.1. unclear etiology, no medications that can cause. Potentially decrease PO intake given he is not domiciled  - replete as necessary, replete mag prior  - Repeat BMP in AM  - Replete K<3.5

## 2022-01-30 ENCOUNTER — TRANSCRIPTION ENCOUNTER (OUTPATIENT)
Age: 32
End: 2022-01-30

## 2022-01-30 LAB
ALBUMIN SERPL ELPH-MCNC: 3.6 G/DL — SIGNIFICANT CHANGE UP (ref 3.3–5)
ALP SERPL-CCNC: 85 U/L — SIGNIFICANT CHANGE UP (ref 40–120)
ALT FLD-CCNC: 14 U/L — SIGNIFICANT CHANGE UP (ref 4–41)
ANION GAP SERPL CALC-SCNC: 9 MMOL/L — SIGNIFICANT CHANGE UP (ref 7–14)
AST SERPL-CCNC: 15 U/L — SIGNIFICANT CHANGE UP (ref 4–40)
BASOPHILS # BLD AUTO: 0.02 K/UL — SIGNIFICANT CHANGE UP (ref 0–0.2)
BASOPHILS NFR BLD AUTO: 0.3 % — SIGNIFICANT CHANGE UP (ref 0–2)
BILIRUB SERPL-MCNC: 0.6 MG/DL — SIGNIFICANT CHANGE UP (ref 0.2–1.2)
BUN SERPL-MCNC: 11 MG/DL — SIGNIFICANT CHANGE UP (ref 7–23)
CALCIUM SERPL-MCNC: 8.7 MG/DL — SIGNIFICANT CHANGE UP (ref 8.4–10.5)
CHLORIDE SERPL-SCNC: 101 MMOL/L — SIGNIFICANT CHANGE UP (ref 98–107)
CO2 SERPL-SCNC: 24 MMOL/L — SIGNIFICANT CHANGE UP (ref 22–31)
CREAT SERPL-MCNC: 0.92 MG/DL — SIGNIFICANT CHANGE UP (ref 0.5–1.3)
EOSINOPHIL # BLD AUTO: 0.28 K/UL — SIGNIFICANT CHANGE UP (ref 0–0.5)
EOSINOPHIL NFR BLD AUTO: 4.2 % — SIGNIFICANT CHANGE UP (ref 0–6)
GLUCOSE SERPL-MCNC: 134 MG/DL — HIGH (ref 70–99)
HCT VFR BLD CALC: 40.5 % — SIGNIFICANT CHANGE UP (ref 39–50)
HGB BLD-MCNC: 12.9 G/DL — LOW (ref 13–17)
IANC: 3.13 K/UL — SIGNIFICANT CHANGE UP (ref 1.5–8.5)
IMM GRANULOCYTES NFR BLD AUTO: 0.5 % — SIGNIFICANT CHANGE UP (ref 0–1.5)
LYMPHOCYTES # BLD AUTO: 2.4 K/UL — SIGNIFICANT CHANGE UP (ref 1–3.3)
LYMPHOCYTES # BLD AUTO: 36.1 % — SIGNIFICANT CHANGE UP (ref 13–44)
MAGNESIUM SERPL-MCNC: 1.7 MG/DL — SIGNIFICANT CHANGE UP (ref 1.6–2.6)
MCHC RBC-ENTMCNC: 28.5 PG — SIGNIFICANT CHANGE UP (ref 27–34)
MCHC RBC-ENTMCNC: 31.9 GM/DL — LOW (ref 32–36)
MCV RBC AUTO: 89.6 FL — SIGNIFICANT CHANGE UP (ref 80–100)
MONOCYTES # BLD AUTO: 0.78 K/UL — SIGNIFICANT CHANGE UP (ref 0–0.9)
MONOCYTES NFR BLD AUTO: 11.7 % — SIGNIFICANT CHANGE UP (ref 2–14)
NEUTROPHILS # BLD AUTO: 3.13 K/UL — SIGNIFICANT CHANGE UP (ref 1.8–7.4)
NEUTROPHILS NFR BLD AUTO: 47.2 % — SIGNIFICANT CHANGE UP (ref 43–77)
NRBC # BLD: 0 /100 WBCS — SIGNIFICANT CHANGE UP
NRBC # FLD: 0 K/UL — SIGNIFICANT CHANGE UP
PHOSPHATE SERPL-MCNC: 2.2 MG/DL — LOW (ref 2.5–4.5)
PLATELET # BLD AUTO: 203 K/UL — SIGNIFICANT CHANGE UP (ref 150–400)
POTASSIUM SERPL-MCNC: 3.4 MMOL/L — LOW (ref 3.5–5.3)
POTASSIUM SERPL-SCNC: 3.4 MMOL/L — LOW (ref 3.5–5.3)
PROT SERPL-MCNC: 7 G/DL — SIGNIFICANT CHANGE UP (ref 6–8.3)
RBC # BLD: 4.52 M/UL — SIGNIFICANT CHANGE UP (ref 4.2–5.8)
RBC # FLD: 12.6 % — SIGNIFICANT CHANGE UP (ref 10.3–14.5)
SODIUM SERPL-SCNC: 134 MMOL/L — LOW (ref 135–145)
WBC # BLD: 6.64 K/UL — SIGNIFICANT CHANGE UP (ref 3.8–10.5)
WBC # FLD AUTO: 6.64 K/UL — SIGNIFICANT CHANGE UP (ref 3.8–10.5)

## 2022-01-30 PROCEDURE — 99232 SBSQ HOSP IP/OBS MODERATE 35: CPT | Mod: GC

## 2022-01-30 RX ORDER — POTASSIUM CHLORIDE 20 MEQ
40 PACKET (EA) ORAL ONCE
Refills: 0 | Status: COMPLETED | OUTPATIENT
Start: 2022-01-30 | End: 2022-01-30

## 2022-01-30 RX ORDER — SODIUM,POTASSIUM PHOSPHATES 278-250MG
1 POWDER IN PACKET (EA) ORAL ONCE
Refills: 0 | Status: COMPLETED | OUTPATIENT
Start: 2022-01-30 | End: 2022-01-30

## 2022-01-30 RX ORDER — POTASSIUM PHOSPHATE, MONOBASIC POTASSIUM PHOSPHATE, DIBASIC 236; 224 MG/ML; MG/ML
30 INJECTION, SOLUTION INTRAVENOUS ONCE
Refills: 0 | Status: COMPLETED | OUTPATIENT
Start: 2022-01-30 | End: 2022-01-30

## 2022-01-30 RX ADMIN — Medication 3 MILLIGRAM(S): at 21:31

## 2022-01-30 RX ADMIN — GABAPENTIN 300 MILLIGRAM(S): 400 CAPSULE ORAL at 05:15

## 2022-01-30 RX ADMIN — BICTEGRAVIR SODIUM, EMTRICITABINE, AND TENOFOVIR ALAFENAMIDE FUMARATE 1 TABLET(S): 30; 120; 15 TABLET ORAL at 11:29

## 2022-01-30 RX ADMIN — Medication 1 PACKET(S): at 08:49

## 2022-01-30 RX ADMIN — GABAPENTIN 300 MILLIGRAM(S): 400 CAPSULE ORAL at 21:29

## 2022-01-30 RX ADMIN — POTASSIUM PHOSPHATE, MONOBASIC POTASSIUM PHOSPHATE, DIBASIC 83.33 MILLIMOLE(S): 236; 224 INJECTION, SOLUTION INTRAVENOUS at 11:29

## 2022-01-30 RX ADMIN — Medication 40 MILLIEQUIVALENT(S): at 08:49

## 2022-01-30 RX ADMIN — GABAPENTIN 300 MILLIGRAM(S): 400 CAPSULE ORAL at 13:42

## 2022-01-30 RX ADMIN — QUETIAPINE FUMARATE 300 MILLIGRAM(S): 200 TABLET, FILM COATED ORAL at 21:30

## 2022-01-30 RX ADMIN — Medication 30 MILLILITER(S): at 05:15

## 2022-01-30 NOTE — DISCHARGE NOTE PROVIDER - NSDCCPTREATMENT_GEN_ALL_CORE_FT
PRINCIPAL PROCEDURE  Procedure: MRI of cervical spine  Findings and Treatment: and thoracic spine:

## 2022-01-30 NOTE — DISCHARGE NOTE PROVIDER - NSDCCPCAREPLAN_GEN_ALL_CORE_FT
PRINCIPAL DISCHARGE DIAGNOSIS  Diagnosis: Lower extremity weakness  Assessment and Plan of Treatment: You presented to the hospital with complaints of lower extremity weakness. You have had extensive imaging and procedures done in the past without any revealing cause. Neurology was consulted during your stay, who recommended obtaining a repeat MR. Repeat MR was done, which showed ??.  At this time, you are stable for discharge from medical perspective. Please follow-up with your neurologist for further management of your condition. Please follow-up with your doctor in 1-2 weeks. If your symptoms worsen or return, please contact your doctor immediately. If you are not able to reach your doctor, please go to the nearest emergency department.         PRINCIPAL DISCHARGE DIAGNOSIS  Diagnosis: Lower extremity weakness  Assessment and Plan of Treatment: You presented to the hospital with complaints of lower extremity weakness. You have had extensive imaging and procedures done in the past without any revealing cause. Neurology was consulted during your stay, who recommended obtaining a repeat MR. Physical therapy and physiatry evaluated you and recommended you go to rehab. An MRI of your spine was done which did not find anything that could be causing your symptoms. You felt improvement in your weakness, and you ultimately declined to go to rehab.  At this time, you are stable for discharge from medical perspective. Please follow-up with your neurologist for further management of your condition. Please follow-up with your doctor in 1-2 weeks. If your symptoms worsen or return, please contact your doctor immediately. If you are not able to reach your doctor, please go to the nearest emergency department.         PRINCIPAL DISCHARGE DIAGNOSIS  Diagnosis: Lower extremity weakness  Assessment and Plan of Treatment: You presented to the hospital with complaints of lower extremity weakness. You have had extensive imaging and procedures done in the past without any revealing cause. Neurology was consulted during your stay, who recommended obtaining a repeat MR. Physical therapy and physiatry evaluated you and recommended you go to rehab. An MRI of your spine was recommended by the neurologist. You felt improvement in your weakness, and you ultimately declined to obtain the MRI or go to rehab.  At this time, you are stable for discharge from medical perspective. Please follow-up with your neurologist for further management of your condition. Please follow-up with your doctor in 1-2 weeks. If your symptoms worsen or return, please contact your doctor immediately. If you are not able to reach your doctor, please go to the nearest emergency department.

## 2022-01-30 NOTE — PROGRESS NOTE ADULT - PROBLEM SELECTOR PLAN 3
Patient with hypokalemia to 3.1. unclear etiology, no medications that can cause. Potentially decrease PO intake given he is not domiciled  - replete as necessary, replete mag prior  - Repeat BMP in AM  - Replete K<3.5 Patient with hypokalemia to 3.1. unclear etiology, no medications that can cause. Potentially decrease PO intake given he is not domiciled  - replete as necessary, replete mag prior  - Repeat BMP in AM  - Replete K if <3.5

## 2022-01-30 NOTE — PROGRESS NOTE ADULT - ASSESSMENT
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

## 2022-01-30 NOTE — DISCHARGE NOTE PROVIDER - NSDCFUADDAPPT_GEN_ALL_CORE_FT
Please follow up with your primary care provider and neurologist upon discharge. Please follow up with your infectious disease doctor and neurologist upon discharge.    Please call the Medicine Specialties at [facility] to establish a primary care provider.

## 2022-01-30 NOTE — DISCHARGE NOTE PROVIDER - CARE PROVIDER_API CALL
Selin Wilks)  Neurology  General  [address] [place] [id number]  Phone: [phone]  Fax: [phone]  Follow Up Time: 1 month

## 2022-01-30 NOTE — DISCHARGE NOTE PROVIDER - NSFOLLOWUPCLINICS_GEN_ALL_ED_FT
[facility] Specialties at [place]  Internal Medicine  [address] [place] [address]  Phone: [phone]  Fax: [phone]     [facility] Medicine Specialties at [place]  Internal Medicine  [address] [place] [address]  Phone: [phone]  Fax: [phone]    [facility] - Infectious Disease  Infectious Disease  [address] [place] [address], [place] [address]  Phone: [phone]  Fax:

## 2022-01-30 NOTE — DISCHARGE NOTE PROVIDER - NSDCMRMEDTOKEN_GEN_ALL_CORE_FT
baclofen 5 mg oral tablet: 3 tab(s) orally 3 times a day  Biktarvy oral tablet: 1 tab(s) orally once a day  gabapentin 300 mg oral capsule: 1 cap(s) orally 3 times a day  Melatonin 3 mg oral tablet: 1 tab(s) orally once a day (at bedtime)  SEROquel 200 mg oral tablet: 1 tab(s) orally once a day (at bedtime)   bictegravir/emtricitabine/tenofovir 50 mg-200 mg-25 mg oral tablet: 1 tab(s) orally once a day  gabapentin 300 mg oral capsule: 1 cap(s) orally 3 times a day  QUEtiapine 300 mg oral tablet: 1 tab(s) orally once a day (at bedtime)

## 2022-01-30 NOTE — PROGRESS NOTE ADULT - SUBJECTIVE AND OBJECTIVE BOX
PROGRESS NOTE:   Authored by Jose Luis Bingham MD  PGY-2, Internal Medicine  Pager [facility] [phone], [phone] [id number]     Patient is a 31y old  Male who presents with a chief complaint of LE weakness (29 Jan 2022 07:10)      SUBJECTIVE / OVERNIGHT EVENTS: No events overnight.    ADDITIONAL REVIEW OF SYSTEMS: Denies fevers, chills, n/v.    MEDICATIONS  (STANDING):  bictegravir 50 mG/emtricitabine 200 mG/tenofovir alafenamide 25 mG (BIKTARVY) 1 Tablet(s) Oral daily  enoxaparin Injectable 40 milliGRAM(s) SubCutaneous daily  gabapentin 300 milliGRAM(s) Oral three times a day  influenza   Vaccine 0.5 milliLiter(s) IntraMuscular once  QUEtiapine 300 milliGRAM(s) Oral at bedtime    MEDICATIONS  (PRN):  acetaminophen     Tablet .. 650 milliGRAM(s) Oral every 6 hours PRN Temp greater or equal to 38C (100.4F), Mild Pain (1 - 3)  aluminum hydroxide/magnesium hydroxide/simethicone Suspension 30 milliLiter(s) Oral every 4 hours PRN Dyspepsia  LORazepam   Injectable 1 milliGRAM(s) IV Push once PRN Agitation  melatonin 3 milliGRAM(s) Oral at bedtime PRN Insomnia      CAPILLARY BLOOD GLUCOSE        I&O's Summary    29 Jan 2022 07:01  -  30 Jan 2022 07:00  --------------------------------------------------------  IN: 750 mL / OUT: 1450 mL / NET: -700 mL        PHYSICAL EXAM:  Vital Signs Last 24 Hrs  T(C): 36.9 (30 Jan 2022 04:41), Max: 37.1 (29 Jan 2022 21:09)  T(F): 98.5 (30 Jan 2022 04:41), Max: 98.8 (29 Jan 2022 21:09)  HR: 96 (30 Jan 2022 04:41) (88 - 99)  BP: 136/76 (30 Jan 2022 04:41) (132/61 - 151/71)  BP(mean): --  RR: 18 (30 Jan 2022 04:41) (17 - 18)  SpO2: 98% (30 Jan 2022 04:41) (97% - 100%)    CONSTITUTIONAL: NAD, lying in bed comfortably  RESPIRATORY: Normal respiratory effort; CTABL  CARDIOVASCULAR: Regular rate and rhythm, normal S1 and S2, no murmur/rub/gallop  ABDOMEN: Soft, nondistended, nontender to palpation, normoactive bowel sounds, no rebound/guarding  MUSCLOSKELETAL: no joint swelling or tenderness to palpation, FROM all extremities  NEURO: AAOx3 to person, place, and time, full and equal strength all extremities   EXTREMITIES: no pedal edema    LABS:                        12.9   6.64  )-----------( 203      ( 30 Jan 2022 06:18 )             40.5     01-30    134<L>  |  101  |  11  ----------------------------<  134<H>  3.4<L>   |  24  |  0.92    Ca    8.7      30 Jan 2022 06:18  Phos  2.2     01-30  Mg     1.70     01-30    TPro  7.0  /  Alb  3.6  /  TBili  0.6  /  DBili  x   /  AST  15  /  ALT  14  /  AlkPhos  85  01-30                RADIOLOGY & ADDITIONAL TESTS:     PROGRESS NOTE:   Authored by Jose Luis Bingham MD  PGY-2, Internal Medicine  Pager [facility] [phone], [phone] [id number]     Patient is a 31y old  Male who presents with a chief complaint of LE weakness (29 Jan 2022 07:10)      SUBJECTIVE / OVERNIGHT EVENTS: No events overnight.    ADDITIONAL REVIEW OF SYSTEMS: Denies fevers, chills, n/v.    MEDICATIONS  (STANDING):  bictegravir 50 mG/emtricitabine 200 mG/tenofovir alafenamide 25 mG (BIKTARVY) 1 Tablet(s) Oral daily  enoxaparin Injectable 40 milliGRAM(s) SubCutaneous daily  gabapentin 300 milliGRAM(s) Oral three times a day  influenza   Vaccine 0.5 milliLiter(s) IntraMuscular once  QUEtiapine 300 milliGRAM(s) Oral at bedtime    MEDICATIONS  (PRN):  acetaminophen     Tablet .. 650 milliGRAM(s) Oral every 6 hours PRN Temp greater or equal to 38C (100.4F), Mild Pain (1 - 3)  aluminum hydroxide/magnesium hydroxide/simethicone Suspension 30 milliLiter(s) Oral every 4 hours PRN Dyspepsia  LORazepam   Injectable 1 milliGRAM(s) IV Push once PRN Agitation  melatonin 3 milliGRAM(s) Oral at bedtime PRN Insomnia      CAPILLARY BLOOD GLUCOSE        I&O's Summary    29 Jan 2022 07:01  -  30 Jan 2022 07:00  --------------------------------------------------------  IN: 750 mL / OUT: 1450 mL / NET: -700 mL        PHYSICAL EXAM:  Vital Signs Last 24 Hrs  T(C): 36.9 (30 Jan 2022 04:41), Max: 37.1 (29 Jan 2022 21:09)  T(F): 98.5 (30 Jan 2022 04:41), Max: 98.8 (29 Jan 2022 21:09)  HR: 96 (30 Jan 2022 04:41) (88 - 99)  BP: 136/76 (30 Jan 2022 04:41) (132/61 - 151/71)  BP(mean): --  RR: 18 (30 Jan 2022 04:41) (17 - 18)  SpO2: 98% (30 Jan 2022 04:41) (97% - 100%)    CONSTITUTIONAL: NAD, lying in bed comfortably  RESPIRATORY: Normal respiratory effort; CTABL  CARDIOVASCULAR: Regular rate and rhythm, normal S1 and S2, no murmur/rub/gallop  ABDOMEN: Soft, nondistended, nontender to palpation, normoactive bowel sounds, no rebound/guarding  MUSCLOSKELETAL: no joint swelling or tenderness to palpation  NEURO: AAOx3 to person, place, and time. 3/5 strength B/L in LE  EXTREMITIES: no pedal edema.    LABS:                        12.9   6.64  )-----------( 203      ( 30 Jan 2022 06:18 )             40.5     01-30    134<L>  |  101  |  11  ----------------------------<  134<H>  3.4<L>   |  24  |  0.92    Ca    8.7      30 Jan 2022 06:18  Phos  2.2     01-30  Mg     1.70     01-30    TPro  7.0  /  Alb  3.6  /  TBili  0.6  /  DBili  x   /  AST  15  /  ALT  14  /  AlkPhos  85  01-30                RADIOLOGY & ADDITIONAL TESTS:

## 2022-01-30 NOTE — PROGRESS NOTE ADULT - PROBLEM SELECTOR PLAN 1
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 Extensive prior work-up and imaging inconsistent with myasthenia gravis and GBS. Differential diagnosis to consider hypokalemia, however, unlikely to explain this worsening LE weakness for past few years. 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 Patient refused to go last night as claustrophobic even with valium. Will give ativan 1mg   -- f/u CBC w/ diff, CMP, ESR, glucose, A1c, TSH, T4, B12, MMA, homocysteine  - ID consulted, appreciate recs  -- f/u Hep C labs  - fall precaution  - PT/OT  - There may be psychogenic/malingering component to his weakness. 1/28 in the AM, patient was moving his lower extremity when sleeping/ about to waking up. 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 Extensive prior work-up and imaging inconsistent with myasthenia gravis and GBS. Differential diagnosis to consider hypokalemia, however, unlikely to explain this worsening LE weakness for past few years. 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 Patient refused to go last night as claustrophobic even with valium. Will give ativan 1mg prior  -- f/u CBC w/ diff, CMP, ESR, glucose, A1c, TSH, T4, B12, MMA, homocysteine  - ID consulted, appreciate recs  -- f/u Hep C labs  - fall precaution  - PT/OT  - There may be psychogenic/malingering component to his weakness. 1/28 in the AM, patient was moving his lower extremity when sleeping/ about to waking up. During my exam, he was not able move the extremity in the same manner

## 2022-01-30 NOTE — PROGRESS NOTE ADULT - PROBLEM SELECTOR PLAN 2
Patient on biktarvy  - Last CD4 262  - HIV viral load: 29/184  - C/w home medication Patient on biktarvy  - Last CD4 262  - HIV viral load: 29,184  - C/w home medication

## 2022-01-30 NOTE — DISCHARGE NOTE PROVIDER - HOSPITAL COURSE
31 year old male w/ PMHx congenital HIV (on Biktarvy) and reported GBS diagnosis in 2018 presenting to [facility] ED complaining of worsening of baseline BLE weakness (left worse than right) and urinary retention. Patient also reports 7/10 low back and thigh and calf pain; describes it as sharp and burning at time. Patient states that he was diagnosed with GBS in 2018 at [place] after he presented there for bilateral LE weakness; although patient had an LP at that time and was told the results were negative. Since then, pt has had recurrent episodes of worsening BLE weakness with intermittent improvement between these episodes.     Patient states that there are days he is able to walk unassisted, but otherwise uses a cane. Patient states that he has been having worsening recurrence of weakness with urinary rentention prompting him to come ED for evaluation. Of note, patient has been seen in the hospital multiple times, most recently in 9/2021. Patient has had repeated MR cervical/thoracic/lumbar spine in the past without any significant findings. Patient was seen by [facility] (Dr. Wilks) in 9/2021. It was assessed that his history and exam were inconsistent with GBS and more consistent with HIV myelopathy.    Hospital Course  In the ED, VSS. Labs notable for hypokalemia s/p potassium repletion. Patient admitted to medicine for further neurological management. Neurology consulted. Per neurology, patient has had extensive prior work-up and imaging was inconsistent with myasthenia gravis and GBS. They recommended repeating an MR and obtaining an ID consult for possible HIV myelopathy. ID was consulted but patient unlikely to have HIV myelopathy per ID. MR was done which was non-revealing. Patient's symptoms were also not consistent with any neurological pattern. There was a concern for possible psychogenic component vs malingering. ?Psychiatry was consulted, who recommended v??    At this time, patient is medically stable for discharge. He will follow-up with outpatient neurologist for further management. 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 Patient also reports 7/10 low back and thigh and calf pain; describes it as sharp and burning at time. Patient states that he was diagnosed with GBS in 2018 at [place] after he presented there for bilateral LE weakness; although patient had an LP at that time and was told the results were negative. Since then, pt has had recurrent episodes of worsening BLE weakness with intermittent improvement between these episodes.     Patient states that there are days he is able to walk unassisted, but otherwise uses a cane. Patient states that he has been having worsening recurrence of weakness with urinary rentention prompting him to come ED for evaluation. Of note, patient has been seen in the hospital multiple times, most recently in 9/2021. Patient has had repeated MR cervical/thoracic/lumbar spine in the past without any significant findings. Patient was seen by [facility]rology (Dr. Wilks) in 9/2021. It was assessed that his history and exam were inconsistent with GBS and more consistent with HIV myelopathy.    Hospital Course  In the ED, VSS. Labs notable for hypokalemia and hypomagnesemia s/p repletion. Patient admitted to medicine for further neurological management. Neurology consulted. Per neurology, patient has had extensive prior work-up and imaging was inconsistent with myasthenia gravis and GBS. They recommended repeating an MR and obtaining an ID consult for possible HIV myelopathy. ID evaluated him and stated that HIV myelopathy typically only presents in patients with end-stage HIV and would be unlikely given his stage of HIV. MR c and t spine was done which was non-revealing. Patient's symptoms were also not consistent with any neurological pattern.   PT recommended acute rehab. PM&R evaluated him and recommended [facility] d/t his undomiciled status. Patient ultimately declined to go to rehab. He is medically stable for discharge. He will follow-up with outpatient neurologist for further management. 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 Patient also reports 7/10 low back and thigh and calf pain; describes it as sharp and burning at time. Patient states that he was diagnosed with GBS in 2018 at [place] after he presented there for bilateral LE weakness; although patient had an LP at that time and was told the results were negative. Since then, pt has had recurrent episodes of worsening BLE weakness with intermittent improvement between these episodes. Patient states that there are days he is able to walk unassisted, but otherwise uses a cane. Patient states that he has been having worsening recurrence of weakness with urinary rentention prompting him to come ED for evaluation. Of note, patient has been seen in the hospital multiple times, most recently in 9/2021. Patient has had repeated MR cervical/thoracic/lumbar spine in the past without any significant findings. Patient was seen by [facility]rology (Dr. Wilks) in 9/2021. It was assessed that his history and exam were inconsistent with GBS and more consistent with HIV myelopathy.    Hospital Course  In the ED, VSS. Labs notable for hypokalemia and hypomagnesemia s/p repletion. Patient admitted to medicine for further neurological management. Per neurology, patient has had extensive prior work-up and imaging was inconsistent with myasthenia gravis and GBS. They recommended repeating an MR C+T spine and obtaining an ID consult for possible HIV myelopathy. ID evaluated him and stated that HIV myelopathy typically only presents in patients with end-stage HIV and would be unlikely given his stage of HIV. MR C+T spine was non-revealing. Patient's symptoms were also not consistent with any neurological pattern. PT recommended acute rehab. PM&R evaluated him and recommended [facility] d/t his undomiciled status. Patient's weakness improved and he was able to ambulate. He ultimately declined to go to rehab. He is medically stable for discharge. He will follow-up with his outpatient neurologist for further management. 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 Patient also reports 7/10 low back and thigh and calf pain; describes it as sharp and burning at time. Patient states that he was diagnosed with GBS in 2018 at [place] after he presented there for bilateral LE weakness; although patient had an LP at that time and was told the results were negative. Since then, pt has had recurrent episodes of worsening BLE weakness with intermittent improvement between these episodes. Patient states that there are days he is able to walk unassisted, but otherwise uses a cane. Patient states that he has been having worsening recurrence of weakness with urinary rentention prompting him to come ED for evaluation. Of note, patient has been seen in the hospital multiple times, most recently in 9/2021. Patient has had repeated MR cervical/thoracic/lumbar spine in the past without any significant findings. Patient was seen by [facility] Neurology (Dr. Wilks) in 9/2021. It was assessed that his history and exam were inconsistent with GBS and more consistent with HIV myelopathy.    Hospital Course  In the ED, VSS. Labs notable for hypokalemia and hypomagnesemia s/p repletion. Patient admitted to medicine for further neurological management. Per neurology, patient has had extensive prior work-up and imaging was inconsistent with myasthenia gravis and GBS. They recommended repeating an MR C+T spine and obtaining an ID consult for possible HIV myelopathy. ID evaluated him and stated that HIV myelopathy typically only presents in patients with end-stage HIV and would be unlikely given his stage of HIV. PT recommended acute rehab. PM&R evaluated him and recommended [facility] d/t his undomiciled status. He ultimately declined to go to rehab and declined to stay for MR C+T spine. Patient's weakness improved and he was able to ambulate. He is medically stable for discharge. He will follow-up with his outpatient neurologist and infectious disease doctor for further management.

## 2022-01-30 NOTE — PROGRESS NOTE ADULT - PROBLEM SELECTOR PLAN 4
Reported hx of GBS at [facility] in 2018. However, history and work-up inconsistent. On home baclofen and gabapentin, reportedly helps  - Can restart gabapentin, will speak with neuro

## 2022-01-31 LAB
ANION GAP SERPL CALC-SCNC: 10 MMOL/L — SIGNIFICANT CHANGE UP (ref 7–14)
BUN SERPL-MCNC: 10 MG/DL — SIGNIFICANT CHANGE UP (ref 7–23)
CALCIUM SERPL-MCNC: 9.4 MG/DL — SIGNIFICANT CHANGE UP (ref 8.4–10.5)
CHLORIDE SERPL-SCNC: 101 MMOL/L — SIGNIFICANT CHANGE UP (ref 98–107)
CO2 SERPL-SCNC: 26 MMOL/L — SIGNIFICANT CHANGE UP (ref 22–31)
CREAT SERPL-MCNC: 0.86 MG/DL — SIGNIFICANT CHANGE UP (ref 0.5–1.3)
GLUCOSE SERPL-MCNC: 129 MG/DL — HIGH (ref 70–99)
HCT VFR BLD CALC: 40.2 % — SIGNIFICANT CHANGE UP (ref 39–50)
HGB BLD-MCNC: 13.3 G/DL — SIGNIFICANT CHANGE UP (ref 13–17)
HOMOCYSTEINE LEVEL: 10.6 UMOL/L — SIGNIFICANT CHANGE UP
MAGNESIUM SERPL-MCNC: 1.7 MG/DL — SIGNIFICANT CHANGE UP (ref 1.6–2.6)
MCHC RBC-ENTMCNC: 29.2 PG — SIGNIFICANT CHANGE UP (ref 27–34)
MCHC RBC-ENTMCNC: 33.1 GM/DL — SIGNIFICANT CHANGE UP (ref 32–36)
MCV RBC AUTO: 88.2 FL — SIGNIFICANT CHANGE UP (ref 80–100)
METHYLMALONIC ACID LEVEL: 62 NMOL/L — LOW (ref 87–318)
NRBC # BLD: 0 /100 WBCS — SIGNIFICANT CHANGE UP
NRBC # FLD: 0 K/UL — SIGNIFICANT CHANGE UP
PHOSPHATE SERPL-MCNC: 3.3 MG/DL — SIGNIFICANT CHANGE UP (ref 2.5–4.5)
PLATELET # BLD AUTO: 222 K/UL — SIGNIFICANT CHANGE UP (ref 150–400)
POTASSIUM SERPL-MCNC: 3.5 MMOL/L — SIGNIFICANT CHANGE UP (ref 3.5–5.3)
POTASSIUM SERPL-SCNC: 3.5 MMOL/L — SIGNIFICANT CHANGE UP (ref 3.5–5.3)
RBC # BLD: 4.56 M/UL — SIGNIFICANT CHANGE UP (ref 4.2–5.8)
RBC # FLD: 12.6 % — SIGNIFICANT CHANGE UP (ref 10.3–14.5)
SODIUM SERPL-SCNC: 137 MMOL/L — SIGNIFICANT CHANGE UP (ref 135–145)
WBC # BLD: 5.1 K/UL — SIGNIFICANT CHANGE UP (ref 3.8–10.5)
WBC # FLD AUTO: 5.1 K/UL — SIGNIFICANT CHANGE UP (ref 3.8–10.5)

## 2022-01-31 PROCEDURE — 99232 SBSQ HOSP IP/OBS MODERATE 35: CPT

## 2022-01-31 PROCEDURE — 99232 SBSQ HOSP IP/OBS MODERATE 35: CPT | Mod: GC

## 2022-01-31 RX ADMIN — QUETIAPINE FUMARATE 300 MILLIGRAM(S): 200 TABLET, FILM COATED ORAL at 21:04

## 2022-01-31 RX ADMIN — Medication 3 MILLIGRAM(S): at 21:04

## 2022-01-31 RX ADMIN — Medication 30 MILLILITER(S): at 05:44

## 2022-01-31 RX ADMIN — GABAPENTIN 300 MILLIGRAM(S): 400 CAPSULE ORAL at 14:16

## 2022-01-31 RX ADMIN — GABAPENTIN 300 MILLIGRAM(S): 400 CAPSULE ORAL at 05:44

## 2022-01-31 RX ADMIN — BICTEGRAVIR SODIUM, EMTRICITABINE, AND TENOFOVIR ALAFENAMIDE FUMARATE 1 TABLET(S): 30; 120; 15 TABLET ORAL at 14:17

## 2022-01-31 RX ADMIN — GABAPENTIN 300 MILLIGRAM(S): 400 CAPSULE ORAL at 21:04

## 2022-01-31 NOTE — PROGRESS NOTE ADULT - PROBLEM SELECTOR PLAN 5
DVT: lovenox  Diet: Regular  Dispo: unclear, pending clinical improvement. Patient is homeless, needs a long term placement DVT: lovenox  Diet: Regular  Dispo: PT rec acute rehab.

## 2022-01-31 NOTE — PROGRESS NOTE ADULT - SUBJECTIVE AND OBJECTIVE BOX
Keena Bonilla MD    Internal Medicine Resident (PGY-1)  Pager: [phone] (NS) / [phone] ([facility])  ___________________________________________________________________________________________________      NELSON MITCHELL 31y Male    Overnight events/subjective: No acute overnight events. Patient seen and examined at bedside. No complaints. Denies fever, chills, chest pain, shortness of breath, abdominal pain, nausea, vomiting, changes in bowel habits, or urinary symptoms.    Vital Signs Last 24 Hrs  T(C): 36.3 (31 Jan 2022 05:41), Max: 36.8 (30 Jan 2022 21:25)  T(F): 97.3 (31 Jan 2022 05:41), Max: 98.2 (30 Jan 2022 21:25)  HR: 86 (31 Jan 2022 05:41) (85 - 86)  BP: 144/78 (31 Jan 2022 05:41) (126/77 - 144/78)  BP(mean): --  RR: 17 (31 Jan 2022 05:41) (16 - 17)  SpO2: 96% (31 Jan 2022 05:41) (96% - 98%)    PHYSICAL EXAM:  CONSTITUTIONAL: NAD, lying in bed comfortably  RESPIRATORY: Normal respiratory effort; CTABL  CARDIOVASCULAR: Regular rate and rhythm, normal S1 and S2, no murmur/rub/gallop  ABDOMEN: Soft, nondistended, nontender to palpation, normoactive bowel sounds, no rebound/guarding  MUSCLOSKELETAL: no joint swelling or tenderness to palpation  NEURO: AAOx3 to person, place, and time. 3/5 strength B/L in LE  EXTREMITIES: no pedal edema.    HOSPITAL MEDICATIONS:  MEDICATIONS  (STANDING):  bictegravir 50 mG/emtricitabine 200 mG/tenofovir alafenamide 25 mG (BIKTARVY) 1 Tablet(s) Oral daily  enoxaparin Injectable 40 milliGRAM(s) SubCutaneous daily  gabapentin 300 milliGRAM(s) Oral three times a day  influenza   Vaccine 0.5 milliLiter(s) IntraMuscular once  QUEtiapine 300 milliGRAM(s) Oral at bedtime    MEDICATIONS  (PRN):  acetaminophen     Tablet .. 650 milliGRAM(s) Oral every 6 hours PRN Temp greater or equal to 38C (100.4F), Mild Pain (1 - 3)  aluminum hydroxide/magnesium hydroxide/simethicone Suspension 30 milliLiter(s) Oral every 4 hours PRN Dyspepsia  LORazepam   Injectable 1 milliGRAM(s) IV Push once PRN Agitation  melatonin 3 milliGRAM(s) Oral at bedtime PRN Insomnia      LABS:                        12.9   6.64  )-----------( 203      ( 30 Jan 2022 06:18 )             40.5     01-30    134<L>  |  101  |  11  ----------------------------<  134<H>  3.4<L>   |  24  |  0.92    Ca    8.7      30 Jan 2022 06:18  Phos  2.2     01-30  Mg     1.70     01-30    TPro  7.0  /  Alb  3.6  /  TBili  0.6  /  DBili  x   /  AST  15  /  ALT  14  /  AlkPhos  85  01-30           Keena Bonilla MD    Internal Medicine Resident (PGY-1)  Pager: [phone] (NS) / [phone] ([facility])  ___________________________________________________________________________________________________      NELSON MITCHELL 31y Male  -incomplete note-  Overnight events/subjective: No acute overnight events. Patient seen and examined at bedside. No complaints. Denies fever, chills, chest pain, shortness of breath, abdominal pain, nausea, vomiting, changes in bowel habits, or urinary symptoms.    Vital Signs Last 24 Hrs  T(C): 36.3 (31 Jan 2022 05:41), Max: 36.8 (30 Jan 2022 21:25)  T(F): 97.3 (31 Jan 2022 05:41), Max: 98.2 (30 Jan 2022 21:25)  HR: 86 (31 Jan 2022 05:41) (85 - 86)  BP: 144/78 (31 Jan 2022 05:41) (126/77 - 144/78)  BP(mean): --  RR: 17 (31 Jan 2022 05:41) (16 - 17)  SpO2: 96% (31 Jan 2022 05:41) (96% - 98%)    PHYSICAL EXAM:  CONSTITUTIONAL: NAD, lying in bed comfortably  RESPIRATORY: Normal respiratory effort; CTABL  CARDIOVASCULAR: Regular rate and rhythm, normal S1 and S2, no murmur/rub/gallop  ABDOMEN: Soft, nondistended, nontender to palpation, normoactive bowel sounds, no rebound/guarding  MUSCLOSKELETAL: no joint swelling or tenderness to palpation  NEURO: AAOx3 to person, place, and time. 3/5 strength B/L in LE  EXTREMITIES: no pedal edema.    HOSPITAL MEDICATIONS:  MEDICATIONS  (STANDING):  bictegravir 50 mG/emtricitabine 200 mG/tenofovir alafenamide 25 mG (BIKTARVY) 1 Tablet(s) Oral daily  enoxaparin Injectable 40 milliGRAM(s) SubCutaneous daily  gabapentin 300 milliGRAM(s) Oral three times a day  influenza   Vaccine 0.5 milliLiter(s) IntraMuscular once  QUEtiapine 300 milliGRAM(s) Oral at bedtime    MEDICATIONS  (PRN):  acetaminophen     Tablet .. 650 milliGRAM(s) Oral every 6 hours PRN Temp greater or equal to 38C (100.4F), Mild Pain (1 - 3)  aluminum hydroxide/magnesium hydroxide/simethicone Suspension 30 milliLiter(s) Oral every 4 hours PRN Dyspepsia  LORazepam   Injectable 1 milliGRAM(s) IV Push once PRN Agitation  melatonin 3 milliGRAM(s) Oral at bedtime PRN Insomnia      LABS:                        12.9   6.64  )-----------( 203      ( 30 Jan 2022 06:18 )             40.5     01-30    134<L>  |  101  |  11  ----------------------------<  134<H>  3.4<L>   |  24  |  0.92    Ca    8.7      30 Jan 2022 06:18  Phos  2.2     01-30  Mg     1.70     01-30    TPro  7.0  /  Alb  3.6  /  TBili  0.6  /  DBili  x   /  AST  15  /  ALT  14  /  AlkPhos  85  01-30           Keena Bonilla MD    Internal Medicine Resident (PGY-1)  Pager: [phone] (NS) / [phone] ([facility])  ___________________________________________________________________________________________________      NELSON MITCHELL 31y Male    Overnight events/subjective: No acute overnight events. Patient seen and examined at bedside. C/o continued BLE weakness and BLE spasms, worse at night.     Vital Signs Last 24 Hrs  T(C): 36.3 (31 Jan 2022 05:41), Max: 36.8 (30 Jan 2022 21:25)  T(F): 97.3 (31 Jan 2022 05:41), Max: 98.2 (30 Jan 2022 21:25)  HR: 86 (31 Jan 2022 05:41) (85 - 86)  BP: 144/78 (31 Jan 2022 05:41) (126/77 - 144/78)  BP(mean): --  RR: 17 (31 Jan 2022 05:41) (16 - 17)  SpO2: 96% (31 Jan 2022 05:41) (96% - 98%)    PHYSICAL EXAM:  CONSTITUTIONAL: NAD, lying in bed comfortably  RESPIRATORY: Normal respiratory effort; CTABL  CARDIOVASCULAR: Regular rate and rhythm, normal S1 and S2, no murmur/rub/gallop  ABDOMEN: Soft, nondistended, nontender to palpation, normoactive bowel sounds, no rebound/guarding  MUSCLOSKELETAL: no joint swelling or tenderness to palpation  NEURO: AAOx3 to person, place, and time. 3/5 strength B/L in LE  EXTREMITIES: no pedal edema.    HOSPITAL MEDICATIONS:  MEDICATIONS  (STANDING):  bictegravir 50 mG/emtricitabine 200 mG/tenofovir alafenamide 25 mG (BIKTARVY) 1 Tablet(s) Oral daily  enoxaparin Injectable 40 milliGRAM(s) SubCutaneous daily  gabapentin 300 milliGRAM(s) Oral three times a day  influenza   Vaccine 0.5 milliLiter(s) IntraMuscular once  QUEtiapine 300 milliGRAM(s) Oral at bedtime    MEDICATIONS  (PRN):  acetaminophen     Tablet .. 650 milliGRAM(s) Oral every 6 hours PRN Temp greater or equal to 38C (100.4F), Mild Pain (1 - 3)  aluminum hydroxide/magnesium hydroxide/simethicone Suspension 30 milliLiter(s) Oral every 4 hours PRN Dyspepsia  LORazepam   Injectable 1 milliGRAM(s) IV Push once PRN Agitation  melatonin 3 milliGRAM(s) Oral at bedtime PRN Insomnia      LABS:                        12.9   6.64  )-----------( 203      ( 30 Jan 2022 06:18 )             40.5     01-30    134<L>  |  101  |  11  ----------------------------<  134<H>  3.4<L>   |  24  |  0.92    Ca    8.7      30 Jan 2022 06:18  Phos  2.2     01-30  Mg     1.70     01-30    TPro  7.0  /  Alb  3.6  /  TBili  0.6  /  DBili  x   /  AST  15  /  ALT  14  /  AlkPhos  85  01-30

## 2022-01-31 NOTE — PROGRESS NOTE ADULT - PROBLEM SELECTOR PLAN 4
Reported hx of GBS at [facility] in 2018. However, history and work-up inconsistent. On home baclofen and gabapentin, reportedly helps  - Can restart gabapentin, will speak with neuro Reported hx of GBS at [facility] in 2018. However, history and work-up inconsistent. On home baclofen and gabapentin, reportedly helps  - C/w gabapentin

## 2022-01-31 NOTE — PROGRESS NOTE ADULT - PROBLEM SELECTOR PLAN 3
Patient with hypokalemia to 3.1. unclear etiology, no medications that can cause. Potentially decrease PO intake given he is not domiciled  - replete as necessary, replete mag prior  - Repeat BMP in AM  - Replete K if <3.5

## 2022-01-31 NOTE — PROGRESS NOTE ADULT - SUBJECTIVE AND OBJECTIVE BOX
Follow Up:      Inverval History/ROS:Patient is a 31y old  Male who presents with a chief complaint of LE weakness (31 Jan 2022 07:05)    C/o lower extremity weakness    Allergies    Ceclor (Unknown)  Toradol (Anaphylaxis; Swelling)  Toradol (Swelling)    Intolerances        ANTIMICROBIALS:  bictegravir 50 mG/emtricitabine 200 mG/tenofovir alafenamide 25 mG (BIKTARVY) 1 daily      OTHER MEDS:  acetaminophen     Tablet .. 650 milliGRAM(s) Oral every 6 hours PRN  aluminum hydroxide/magnesium hydroxide/simethicone Suspension 30 milliLiter(s) Oral every 4 hours PRN  enoxaparin Injectable 40 milliGRAM(s) SubCutaneous daily  gabapentin 300 milliGRAM(s) Oral three times a day  influenza   Vaccine 0.5 milliLiter(s) IntraMuscular once  LORazepam   Injectable 2 milliGRAM(s) IV Push once PRN  melatonin 3 milliGRAM(s) Oral at bedtime PRN  QUEtiapine 300 milliGRAM(s) Oral at bedtime      Vital Signs Last 24 Hrs  T(C): 36.2 (31 Jan 2022 11:46), Max: 36.8 (30 Jan 2022 21:25)  T(F): 97.2 (31 Jan 2022 11:46), Max: 98.2 (30 Jan 2022 21:25)  HR: 91 (31 Jan 2022 11:46) (85 - 91)  BP: 125/75 (31 Jan 2022 11:46) (125/75 - 144/78)  BP(mean): --  RR: 18 (31 Jan 2022 11:46) (16 - 18)  SpO2: 97% (31 Jan 2022 11:46) (96% - 98%)    PHYSICAL EXAM:  General: [x ] non-toxic  HEAD/EYES: [ ] PERRL [ ] white sclera [ ] icterus  ENT:  [ ] normal [ x] supple [ ] thrush [ ] pharyngeal exudate  Cardiovascular:   [ ] murmur [x ] normal [ ] PPM/AICD  Respiratory:  [x ] clear to ausculation bilaterally  GI:  [x ] soft, non-tender, normal bowel sounds  :  [ ] gonzales [ ] no CVA tenderness   Musculoskeletal:  [ ] no synovitis  Neurologic:  [ ] non-focal exam   Skin:  [x ] no rash  Lymph: [x ] no lymphadenopathy  Psychiatric:  [ ] appropriate affect [ ] alert & oriented  Lines:  [x ] no phlebitis [ ] central line                                13.3   5.10  )-----------( 222      ( 31 Jan 2022 07:26 )             40.2       01-31    137  |  101  |  10  ----------------------------<  129<H>  3.5   |  26  |  0.86    Ca    9.4      31 Jan 2022 07:26  Phos  3.3     01-31  Mg     1.70     01-31    TPro  7.0  /  Alb  3.6  /  TBili  0.6  /  DBili  x   /  AST  15  /  ALT  14  /  AlkPhos  85  01-30          MICROBIOLOGY:    RADIOLOGY:

## 2022-01-31 NOTE — PROGRESS NOTE ADULT - PROBLEM SELECTOR PLAN 1
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 Extensive prior work-up and imaging inconsistent with myasthenia gravis and GBS. Differential diagnosis to consider hypokalemia, however, unlikely to explain this worsening LE weakness for past few years. 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 Patient refused to go last night as claustrophobic even with valium. Will give ativan 1mg prior  -- f/u CBC w/ diff, CMP, ESR, glucose, A1c, TSH, T4, B12, MMA, homocysteine  - ID consulted, appreciate recs  -- f/u Hep C labs  - fall precaution  - PT/OT  - There may be psychogenic/malingering component to his weakness. 1/28 in the AM, patient was moving his lower extremity when sleeping/ about to waking up. 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 Extensive prior work-up and imaging inconsistent with myasthenia gravis and GBS. Differential diagnosis to consider hypokalemia, however, unlikely to explain this worsening LE weakness for past few years. 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 Patient previously refused d/t claustrophobia even with valium. Plan to give ativan 1mg prior  -- f/u ESR 16, A1c wnl, TSH wnl, B12 wnl, MMA, homocysteine  - ID consulted, appreciate recs  - hep c neg  - fall precaution  - PT/OT  - There may be psychogenic/malingering component to his weakness. 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 Extensive prior work-up and imaging inconsistent with myasthenia gravis and GBS. Differential diagnosis to consider hypokalemia, however, unlikely to explain this worsening LE weakness for past few years. 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 Patient previously refused d/t claustrophobia even with valium. Patient agreeable to trialing ativan prior to MRI. D/w MRI.  -- f/u ESR 16, A1c wnl, TSH wnl, B12 wnl, MMA, homocysteine  - ID consulted, appreciate recs  - hep c neg  - fall precaution  - PT/OT  - There may be psychogenic/malingering component to his weakness. 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 Extensive prior work-up and imaging inconsistent with myasthenia gravis and GBS. Differential diagnosis to consider hypokalemia, however, unlikely to explain this worsening LE weakness for past few years. 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 Patient previously refused d/t claustrophobia even with valium. Patient agreeable to trialing ativan prior to MRI. D/w MRI.  -- f/u ESR 16, A1c wnl, TSH wnl, B12 wnl, MMA, homocysteine  - ID consulted, appreciate recs  - hep c neg  - fall precaution  - PT/OT rec acute rehab - PMR consulted  - There may be psychogenic/malingering component to his weakness. 1/28 in the AM, patient was moving his lower extremity when sleeping/ about to waking up but unable to move the extremity in the same manner during exam

## 2022-01-31 NOTE — PROGRESS NOTE ADULT - ASSESSMENT
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

## 2022-01-31 NOTE — PROGRESS NOTE ADULT - ASSESSMENT
31 year old with congential HIV with recurrent episodes of LE weakness with change in sensation  Pror work up without cause    1) HIV  continue biktarvy   CD 4 over 200 and 22 %  VL 29 K- ? adherence    Repeat HIV viral load around 2/10    Out pt follow up with his HIV provider    HIV myelopathy seen more often in end stage disease.  Would be less common at his state of disease    2) Concern for myelopathy  Follow up repeat imaging  Consider repeat MRI brain   Would he benefit from EMG?   neuro eval in progress    Sulaiman Hercules MD  [phone]  pager [phone]

## 2022-02-01 LAB
ANION GAP SERPL CALC-SCNC: 13 MMOL/L — SIGNIFICANT CHANGE UP (ref 7–14)
BUN SERPL-MCNC: 12 MG/DL — SIGNIFICANT CHANGE UP (ref 7–23)
CALCIUM SERPL-MCNC: 9.4 MG/DL — SIGNIFICANT CHANGE UP (ref 8.4–10.5)
CHLORIDE SERPL-SCNC: 102 MMOL/L — SIGNIFICANT CHANGE UP (ref 98–107)
CO2 SERPL-SCNC: 22 MMOL/L — SIGNIFICANT CHANGE UP (ref 22–31)
CREAT SERPL-MCNC: 0.95 MG/DL — SIGNIFICANT CHANGE UP (ref 0.5–1.3)
GLUCOSE SERPL-MCNC: 89 MG/DL — SIGNIFICANT CHANGE UP (ref 70–99)
HCT VFR BLD CALC: 41.9 % — SIGNIFICANT CHANGE UP (ref 39–50)
HGB BLD-MCNC: 13.1 G/DL — SIGNIFICANT CHANGE UP (ref 13–17)
MAGNESIUM SERPL-MCNC: 1.5 MG/DL — LOW (ref 1.6–2.6)
MCHC RBC-ENTMCNC: 28.1 PG — SIGNIFICANT CHANGE UP (ref 27–34)
MCHC RBC-ENTMCNC: 31.3 GM/DL — LOW (ref 32–36)
MCV RBC AUTO: 89.9 FL — SIGNIFICANT CHANGE UP (ref 80–100)
NRBC # BLD: 0 /100 WBCS — SIGNIFICANT CHANGE UP
NRBC # FLD: 0 K/UL — SIGNIFICANT CHANGE UP
PHOSPHATE SERPL-MCNC: 4.1 MG/DL — SIGNIFICANT CHANGE UP (ref 2.5–4.5)
PLATELET # BLD AUTO: 244 K/UL — SIGNIFICANT CHANGE UP (ref 150–400)
POTASSIUM SERPL-MCNC: 4.1 MMOL/L — SIGNIFICANT CHANGE UP (ref 3.5–5.3)
POTASSIUM SERPL-SCNC: 4.1 MMOL/L — SIGNIFICANT CHANGE UP (ref 3.5–5.3)
RBC # BLD: 4.66 M/UL — SIGNIFICANT CHANGE UP (ref 4.2–5.8)
RBC # FLD: 12.6 % — SIGNIFICANT CHANGE UP (ref 10.3–14.5)
SODIUM SERPL-SCNC: 137 MMOL/L — SIGNIFICANT CHANGE UP (ref 135–145)
WBC # BLD: 3.99 K/UL — SIGNIFICANT CHANGE UP (ref 3.8–10.5)
WBC # FLD AUTO: 3.99 K/UL — SIGNIFICANT CHANGE UP (ref 3.8–10.5)

## 2022-02-01 PROCEDURE — 99232 SBSQ HOSP IP/OBS MODERATE 35: CPT

## 2022-02-01 PROCEDURE — 99233 SBSQ HOSP IP/OBS HIGH 50: CPT

## 2022-02-01 PROCEDURE — 99222 1ST HOSP IP/OBS MODERATE 55: CPT

## 2022-02-01 RX ORDER — MAGNESIUM OXIDE 400 MG ORAL TABLET 241.3 MG
400 TABLET ORAL ONCE
Refills: 0 | Status: COMPLETED | OUTPATIENT
Start: 2022-02-01 | End: 2022-02-01

## 2022-02-01 RX ADMIN — QUETIAPINE FUMARATE 300 MILLIGRAM(S): 200 TABLET, FILM COATED ORAL at 21:56

## 2022-02-01 RX ADMIN — GABAPENTIN 300 MILLIGRAM(S): 400 CAPSULE ORAL at 05:24

## 2022-02-01 RX ADMIN — GABAPENTIN 300 MILLIGRAM(S): 400 CAPSULE ORAL at 12:36

## 2022-02-01 RX ADMIN — GABAPENTIN 300 MILLIGRAM(S): 400 CAPSULE ORAL at 21:57

## 2022-02-01 RX ADMIN — Medication 3 MILLIGRAM(S): at 21:57

## 2022-02-01 RX ADMIN — Medication 650 MILLIGRAM(S): at 14:59

## 2022-02-01 RX ADMIN — MAGNESIUM OXIDE 400 MG ORAL TABLET 400 MILLIGRAM(S): 241.3 TABLET ORAL at 12:36

## 2022-02-01 RX ADMIN — Medication 650 MILLIGRAM(S): at 15:33

## 2022-02-01 RX ADMIN — BICTEGRAVIR SODIUM, EMTRICITABINE, AND TENOFOVIR ALAFENAMIDE FUMARATE 1 TABLET(S): 30; 120; 15 TABLET ORAL at 12:36

## 2022-02-01 NOTE — ED ADULT NURSE NOTE - SUICIDE SCREENING DEPRESSION
Pt received this am with 3 IVs, one pt accidentally pulled out, the second one leaked immediately when flushed.  the third one infusing heparin mild blood tinged output noted in NGT platelets are clumped but appear to be adequate, needs to reordered pt c/o persistent nausea & unable to drink PO potassium pt c/o nausea Negative

## 2022-02-01 NOTE — PROGRESS NOTE ADULT - ASSESSMENT
31 year old with congential HIV with recurrent episodes of LE weakness with change in sensation  Pror work up without cause    1) HIV  continue biktarvy   CD 4 over 200 and 22 %  VL 29 K- ? adherence    Repeat HIV viral load around 2/10    Out pt follow up with his HIV provider    HIV myelopathy seen more often in end stage disease.  Would be less common at his state of disease    2) Concern for myelopathy  Follow up repeat imaging  Consider repeat MRI brain   Would he benefit from EMG?   neuro eval in progress    Sulaiman Hercules MD  [phone]  pager [phone]

## 2022-02-01 NOTE — PROGRESS NOTE ADULT - ASSESSMENT
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

## 2022-02-01 NOTE — PROGRESS NOTE ADULT - PROBLEM SELECTOR PLAN 1
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 Extensive prior work-up and imaging inconsistent with myasthenia gravis and GBS. Differential diagnosis to consider hypokalemia, however, unlikely to explain this worsening LE weakness for past few years. 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 Patient previously refused d/t claustrophobia even with valium. Patient agreeable to trialing ativan prior to MRI. D/w MRI.  -- f/u ESR 16, A1c wnl, TSH wnl, B12 wnl, MMA, homocysteine  - ID consulted, appreciate recs  - hep c neg  - fall precaution  - PT/OT rec acute rehab - PMR consulted  - There may be psychogenic/malingering component to his weakness. 1/28 in the AM, patient was moving his lower extremity when sleeping/ about to waking up but unable to move the extremity in the same manner during exam

## 2022-02-01 NOTE — PROGRESS NOTE ADULT - SUBJECTIVE AND OBJECTIVE BOX
Follow Up:      Inverval History/ROS:Patient is a 31y old  Male who presents with a chief complaint of LE weakness (01 Feb 2022 10:11)      Allergies    Ceclor (Unknown)  Toradol (Anaphylaxis; Swelling)  Toradol (Swelling)    Intolerances        ANTIMICROBIALS:  bictegravir 50 mG/emtricitabine 200 mG/tenofovir alafenamide 25 mG (BIKTARVY) 1 daily      OTHER MEDS:  acetaminophen     Tablet .. 650 milliGRAM(s) Oral every 6 hours PRN  aluminum hydroxide/magnesium hydroxide/simethicone Suspension 30 milliLiter(s) Oral every 4 hours PRN  enoxaparin Injectable 40 milliGRAM(s) SubCutaneous daily  gabapentin 300 milliGRAM(s) Oral three times a day  influenza   Vaccine 0.5 milliLiter(s) IntraMuscular once  LORazepam   Injectable 2 milliGRAM(s) IV Push once PRN  melatonin 3 milliGRAM(s) Oral at bedtime PRN  QUEtiapine 300 milliGRAM(s) Oral at bedtime      Vital Signs Last 24 Hrs  T(C): 36.4 (01 Feb 2022 12:30), Max: 36.8 (31 Jan 2022 21:44)  T(F): 97.6 (01 Feb 2022 12:30), Max: 98.3 (31 Jan 2022 21:44)  HR: 83 (01 Feb 2022 12:30) (71 - 83)  BP: 147/88 (01 Feb 2022 12:30) (118/63 - 147/88)  BP(mean): --  RR: 18 (01 Feb 2022 12:30) (17 - 18)  SpO2: 100% (01 Feb 2022 12:30) (99% - 100%)    PHYSICAL EXAM:  General: [ ] non-toxic  HEAD/EYES: [ ] PERRL [ ] white sclera [ ] icterus  ENT:  [ ] normal [ ] supple [ ] thrush [ ] pharyngeal exudate  Cardiovascular:   [ ] murmur [ ] normal [ ] PPM/AICD  Respiratory:  [ ] clear to ausculation bilaterally  GI:  [ ] soft, non-tender, normal bowel sounds  :  [ ] gonzales [ ] no CVA tenderness   Musculoskeletal:  [ ] no synovitis  Neurologic:  [ ] non-focal exam   Skin:  [ ] no rash  Lymph: [ ] no lymphadenopathy  Psychiatric:  [ ] appropriate affect [ ] alert & oriented  Lines:  [ ] no phlebitis [ ] central line                                13.1   3.99  )-----------( 244      ( 01 Feb 2022 07:46 )             41.9       02-01    137  |  102  |  12  ----------------------------<  89  4.1   |  22  |  0.95    Ca    9.4      01 Feb 2022 07:46  Phos  4.1     02-01  Mg     1.50     02-01            MICROBIOLOGY:    RADIOLOGY:

## 2022-02-01 NOTE — PROGRESS NOTE ADULT - SUBJECTIVE AND OBJECTIVE BOX
Keena Bonilla MD    Internal Medicine Resident (PGY-1)  Pager: [phone] ([facility]) / [phone] ([facility])  ___________________________________________________________________________________________________      NELSON MITCHELL 31y Male    Overnight events/subjective: No acute overnight events. Patient seen and examined at bedside. No complaints. Denies fever, chills, chest pain, shortness of breath, abdominal pain, nausea, vomiting, changes in bowel habits, or urinary symptoms.    Vital Signs Last 24 Hrs  T(C): 36.3 (01 Feb 2022 05:24), Max: 36.8 (31 Jan 2022 21:44)  T(F): 97.3 (01 Feb 2022 05:24), Max: 98.3 (31 Jan 2022 21:44)  HR: 73 (01 Feb 2022 05:24) (71 - 91)  BP: 118/63 (01 Feb 2022 05:24) (118/63 - 134/77)  BP(mean): --  RR: 17 (01 Feb 2022 05:24) (17 - 18)  SpO2: 100% (01 Feb 2022 05:24) (97% - 100%)    PHYSICAL EXAM:  CONSTITUTIONAL: NAD, lying in bed comfortably  RESPIRATORY: Normal respiratory effort; CTABL  CARDIOVASCULAR: Regular rate and rhythm, normal S1 and S2, no murmur/rub/gallop  ABDOMEN: Soft, nondistended, nontender to palpation, normoactive bowel sounds, no rebound/guarding  MUSCLOSKELETAL: no joint swelling or tenderness to palpation  NEURO: AAOx3 to person, place, and time. 3/5 strength B/L in LE  EXTREMITIES: no pedal edema.    HOSPITAL MEDICATIONS:  MEDICATIONS  (STANDING):  bictegravir 50 mG/emtricitabine 200 mG/tenofovir alafenamide 25 mG (BIKTARVY) 1 Tablet(s) Oral daily  enoxaparin Injectable 40 milliGRAM(s) SubCutaneous daily  gabapentin 300 milliGRAM(s) Oral three times a day  influenza   Vaccine 0.5 milliLiter(s) IntraMuscular once  QUEtiapine 300 milliGRAM(s) Oral at bedtime    MEDICATIONS  (PRN):  acetaminophen     Tablet .. 650 milliGRAM(s) Oral every 6 hours PRN Temp greater or equal to 38C (100.4F), Mild Pain (1 - 3)  aluminum hydroxide/magnesium hydroxide/simethicone Suspension 30 milliLiter(s) Oral every 4 hours PRN Dyspepsia  LORazepam   Injectable 2 milliGRAM(s) IV Push once PRN Anxiety  melatonin 3 milliGRAM(s) Oral at bedtime PRN Insomnia      LABS:                        13.3   5.10  )-----------( 222      ( 31 Jan 2022 07:26 )             40.2     01-31    137  |  101  |  10  ----------------------------<  129<H>  3.5   |  26  |  0.86    Ca    9.4      31 Jan 2022 07:26  Phos  3.3     01-31  Mg     1.70     01-31             Keena Bonilla MD    Internal Medicine Resident (PGY-1)  Pager: [phone] ([facility]) / [phone] ([facility])  ___________________________________________________________________________________________________      NELSON MITCHELL 31y Male    Overnight events/subjective: No acute overnight events. Patient seen and examined at bedside. BLE weakness is about the same. No other complaints.    Vital Signs Last 24 Hrs  T(C): 36.3 (01 Feb 2022 05:24), Max: 36.8 (31 Jan 2022 21:44)  T(F): 97.3 (01 Feb 2022 05:24), Max: 98.3 (31 Jan 2022 21:44)  HR: 73 (01 Feb 2022 05:24) (71 - 91)  BP: 118/63 (01 Feb 2022 05:24) (118/63 - 134/77)  BP(mean): --  RR: 17 (01 Feb 2022 05:24) (17 - 18)  SpO2: 100% (01 Feb 2022 05:24) (97% - 100%)    PHYSICAL EXAM:  CONSTITUTIONAL: NAD, lying in bed comfortably  RESPIRATORY: Normal respiratory effort; CTABL  CARDIOVASCULAR: Regular rate and rhythm, normal S1 and S2, no murmur/rub/gallop  ABDOMEN: Soft, nondistended, nontender to palpation, normoactive bowel sounds, no rebound/guarding  MUSCLOSKELETAL: no joint swelling or tenderness to palpation  NEURO: AAOx3 to person, place, and time. 3/5 strength B/L in LE  EXTREMITIES: no pedal edema.    HOSPITAL MEDICATIONS:  MEDICATIONS  (STANDING):  bictegravir 50 mG/emtricitabine 200 mG/tenofovir alafenamide 25 mG (BIKTARVY) 1 Tablet(s) Oral daily  enoxaparin Injectable 40 milliGRAM(s) SubCutaneous daily  gabapentin 300 milliGRAM(s) Oral three times a day  influenza   Vaccine 0.5 milliLiter(s) IntraMuscular once  QUEtiapine 300 milliGRAM(s) Oral at bedtime    MEDICATIONS  (PRN):  acetaminophen     Tablet .. 650 milliGRAM(s) Oral every 6 hours PRN Temp greater or equal to 38C (100.4F), Mild Pain (1 - 3)  aluminum hydroxide/magnesium hydroxide/simethicone Suspension 30 milliLiter(s) Oral every 4 hours PRN Dyspepsia  LORazepam   Injectable 2 milliGRAM(s) IV Push once PRN Anxiety  melatonin 3 milliGRAM(s) Oral at bedtime PRN Insomnia      LABS:                        13.3   5.10  )-----------( 222      ( 31 Jan 2022 07:26 )             40.2     01-31    137  |  101  |  10  ----------------------------<  129<H>  3.5   |  26  |  0.86    Ca    9.4      31 Jan 2022 07:26  Phos  3.3     01-31  Mg     1.70     01-31

## 2022-02-01 NOTE — CONSULT NOTE ADULT - SUBJECTIVE AND OBJECTIVE BOX
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 Patient also reports 7/10 low back and thigh and calf pain; describes it as sharp and burning at time. Patient states that he was diagnosed with GBS in 2018 at [place] after he presented there for bilateral LE weakness; although patient had an LP at that time and was told the results were negative. Since then, pt has had recurrent episodes of worsening BLE weakness with intermittent improvement between these episodes.     Patient states that there are days he is able to walk unassisted, but otherwise uses a cane. Patient states that he has been having worsening recurrence of weakness with urinary rentention prompting him to come ED for evaluation. Of note, patient has been seen in the hospital multiple times, most recently in 9/2021. Patient has had repeated MR cervical/thoracic/lumbar spine in the past without any significant findings. Patient was seen by [facility] (Dr. Wilks) in 9/2021. It was assessed that his history and exam were inconsistent with GBS and more consistent with HIV myelopathy.    At this time, Patient continues to report LE weakness. He is not able to walk to the bathroom. He was able to urinate recently. He denies any recent fevers, chills, HA, blurry vision, SOB, CP, n/v/d, or any other symptoms     In the ED, VSS. Labs notable for hypokalemia s/p potassium repletion. 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   Lives in a shelter, ambulates with device at baseline (cane)    REVIEW OF SYSTEMS  Constitutional - No fever, No weight loss, No fatigue  HEENT - No eye pain, No visual disturbances, No difficulty hearing, No tinnitus, No vertigo, No neck pain  Respiratory - No cough, No wheezing, No shortness of breath  Cardiovascular - No chest pain, No palpitations  Gastrointestinal - No abdominal pain, No nausea, No vomiting, No diarrhea, No constipation  Genitourinary - No dysuria, No frequency, No hematuria, No incontinence  Neurological - No headaches, No memory loss, +loss of strength, No numbness, No tremors  Skin - No itching, No rashes, No lesions   Endocrine - No temperature intolerance  Musculoskeletal - No joint pain, No joint swelling, No muscle pain  Psychiatric - No depression, No anxiety    PAST MEDICAL & SURGICAL HISTORY  HIV (human immunodeficiency virus infection)    Guillain-[place]    Asthma     CVA (cerebral vascular accident)    Closed fracture of multiple ribs of right side, initial encounter    Cocaine abuse    Chronic sinusitis    Homeless    HIV disease    HIV (human immunodeficiency virus infection)    GBS (Guillain [place] syndrome)    Guillain-[place] syndrome    HIV positive    Stroke    History of orthopedic surgery        FUNCTIONAL HISTORY  Lives in a shelter  Independent with cane at baseline    CURRENT FUNCTIONAL STATUS  1/28  Bed Mobility: Rolling/Turning:     · Level of [place]	minimum assist (75% patients effort)  · Physical Assist/Nonphysical Assist	1 person assist; nonverbal cues (demo/gestures); verbal cues    Bed Mobility: Sit to Supine:     · Level of [place]	minimum assist (75% patients effort)  · Physical Assist/Nonphysical Assist	1 person assist; nonverbal cues (demo/gestures); verbal cues    Bed Mobility: Supine to Sit:     · Level of [place]	minimum assist (75% patients effort)  · Physical Assist/Nonphysical Assist	1 person assist; nonverbal cues (demo/gestures); verbal cues; patient able to sit unsupported    Transfer: Sit to Stand:     · Level of [place]	unable to perform    Balance Skills Assessment:     · Sitting Balance: Static	good balance  · Sitting Balance: Dynamic	good balance    Sensory Examination:    Sensory Examination:    Grossly Intact:   · Gross Sensory Examination	Grossly Intact; patient reports chronic "burning sensation" in bilateral feet  	        FAMILY HISTORY   FH: HIV infection (Mother)        RECENT LABS/IMAGING  CBC Full  -  ( 01 Feb 2022 07:46 )  WBC Count : 3.99 K/uL  RBC Count : 4.66 M/uL  Hemoglobin : 13.1 g/dL  Hematocrit : 41.9 %  Platelet Count - Automated : 244 K/uL  Mean Cell Volume : 89.9 fL  Mean Cell Hemoglobin : 28.1 pg  Mean Cell Hemoglobin Concentration : 31.3 gm/dL  Auto Neutrophil # : x  Auto Lymphocyte # : x  Auto Monocyte # : x  Auto Eosinophil # : x  Auto Basophil # : x  Auto Neutrophil % : x  Auto Lymphocyte % : x  Auto Monocyte % : x  Auto Eosinophil % : x  Auto Basophil % : x    02-01    137  |  102  |  12  ----------------------------<  89  4.1   |  22  |  0.95    Ca    9.4      01 Feb 2022 07:46  Phos  4.1     02-01  Mg     1.50     02-01          VITALS  T(C): 36.3 (02-01-22 @ 05:24), Max: 36.8 (01-31-22 @ 21:44)  HR: 73 (02-01-22 @ 05:24) (71 - 91)  BP: 118/63 (02-01-22 @ 05:24) (118/63 - 134/77)  RR: 17 (02-01-22 @ 05:24) (17 - 18)  SpO2: 100% (02-01-22 @ 05:24) (97% - 100%)  Wt(kg): --    ALLERGIES  Ceclor (Unknown)  Toradol (Anaphylaxis; Swelling)  Toradol (Swelling)      MEDICATIONS   acetaminophen     Tablet .. 650 milliGRAM(s) Oral every 6 hours PRN  aluminum hydroxide/magnesium hydroxide/simethicone Suspension 30 milliLiter(s) Oral every 4 hours PRN  bictegravir 50 mG/emtricitabine 200 mG/tenofovir alafenamide 25 mG (BIKTARVY) 1 Tablet(s) Oral daily  enoxaparin Injectable 40 milliGRAM(s) SubCutaneous daily  gabapentin 300 milliGRAM(s) Oral three times a day  influenza   Vaccine 0.5 milliLiter(s) IntraMuscular once  LORazepam   Injectable 2 milliGRAM(s) IV Push once PRN  melatonin 3 milliGRAM(s) Oral at bedtime PRN  QUEtiapine 300 milliGRAM(s) Oral at bedtime      ----------------------------------------------------------------------------------------  PHYSICAL EXAM  Constitutional - NAD, Comfortable  HEENT - NCAT, EOMI  Neck - Supple, No limited ROM  Chest - CTA bilaterally, No wheeze, No rhonchi, No crackles  Cardiovascular - RRR, S1S2, No murmurs  Abdomen - BS+, Soft, NTND  Extremities - No C/C/E, No calf tenderness   Neurologic Exam -                    Cognitive - Awake, Alert, AAO to self, place, date, year, situation     Communication - Fluent, No dysarthria     Cranial Nerves - CN 2-12 intact     Motor - No focal deficits                    LEFT    UE - ShAB 5/5, EF 5/5, EE 5/5, WE 5/5,  5/5                    RIGHT UE - ShAB 5/5, EF 5/5, EE 5/5, WE 5/5,  5/5                    LEFT    LE - HF 5/5, KE 5/5, DF 5/5, PF 5/5                    RIGHT LE - HF 5/5, KE 5/5, DF 5/5, PF 5/5        Sensory - Intact to LT     Reflexes - DTR Intact, No primitive reflexive     Coordination - FTN intact     OculoVestibular - No saccades, No nystagmus, VOR         Balance - WNL Static  Psychiatric - Mood stable, Affect WNL  ----------------------------------------------------------------------------------------  ASSESSMENT/PLAN    Pain -  DVT PPX -   Rehab -      incomplete, evaluation in progress Patient is a 31y old  Male who presents with a chief complaint of LE weakness (01 Feb 2022 07:21)      HPI:  31 year old male w/ 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 Patient also reports 7/10 low back and thigh and calf pain; describes it as sharp and burning at time. Patient states that he was diagnosed with GBS in 2018 at [place] [facility] after he presented there for bilateral LE weakness; although patient had an LP at that time and was told the results were negative. Since then, pt has had recurrent episodes of worsening BLE weakness with intermittent improvement between these episodes.     Patient states that there are days he is able to walk unassisted, but otherwise uses a cane. Patient states that he has been having worsening recurrence of weakness with urinary rentention prompting him to come ED for evaluation. Of note, patient has been seen in the hospital multiple times, most recently in 9/2021. Patient has had repeated MR cervical/thoracic/lumbar spine in the past without any significant findings. Patient was seen by [facility] Neurology (Dr. Wilks) in 9/2021. It was assessed that his history and exam were inconsistent with GBS and more consistent with HIV myelopathy.    At this time, Patient continues to report LE weakness. He is not able to walk to the bathroom. He was able to urinate recently. He denies any recent fevers, chills, HA, blurry vision, SOB, CP, n/v/d, or any other symptoms     In the ED, VSS. Labs notable for hypokalemia s/p potassium repletion. 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   Lives in a shelter, ambulates with device at baseline (cane)  patient states he does not have family he can live with but will reach out to friends.  He was recently hospitalized and discharged to subacute rehab, states he did not do enough therapy there.     REVIEW OF SYSTEMS  +weakness b/l LE    PAST MEDICAL & SURGICAL HISTORY  HIV (human immunodeficiency virus infection)    Guillain-[place]    Asthma     CVA (cerebral vascular accident)    Closed fracture of multiple ribs of right side, initial encounter    Cocaine abuse    Chronic sinusitis    Homeless    HIV disease    HIV (human immunodeficiency virus infection)    GBS (Guillain [place] syndrome)    Guillain-[place] syndrome    HIV positive    Stroke    History of orthopedic surgery        FUNCTIONAL HISTORY  Lives in a shelter  Independent with cane at baseline    CURRENT FUNCTIONAL STATUS  1/28  Bed Mobility: Rolling/Turning:     · Level of [place]	minimum assist (75% patients effort)  · Physical Assist/Nonphysical Assist	1 person assist; nonverbal cues (demo/gestures); verbal cues    Bed Mobility: Sit to Supine:     · Level of [place]	minimum assist (75% patients effort)  · Physical Assist/Nonphysical Assist	1 person assist; nonverbal cues (demo/gestures); verbal cues    Bed Mobility: Supine to Sit:     · Level of [place]	minimum assist (75% patients effort)  · Physical Assist/Nonphysical Assist	1 person assist; nonverbal cues (demo/gestures); verbal cues; patient able to sit unsupported    Transfer: Sit to Stand:     · Level of [place]	unable to perform    Balance Skills Assessment:     · Sitting Balance: Static	good balance  · Sitting Balance: Dynamic	good balance    Sensory Examination:    Sensory Examination:    Grossly Intact:   · Gross Sensory Examination	Grossly Intact; patient reports chronic "burning sensation" in bilateral feet  	        FAMILY HISTORY   FH: HIV infection (Mother)        RECENT LABS/IMAGING  CBC Full  -  ( 01 Feb 2022 07:46 )  WBC Count : 3.99 K/uL  RBC Count : 4.66 M/uL  Hemoglobin : 13.1 g/dL  Hematocrit : 41.9 %  Platelet Count - Automated : 244 K/uL  Mean Cell Volume : 89.9 fL  Mean Cell Hemoglobin : 28.1 pg  Mean Cell Hemoglobin Concentration : 31.3 gm/dL  Auto Neutrophil # : x  Auto Lymphocyte # : x  Auto Monocyte # : x  Auto Eosinophil # : x  Auto Basophil # : x  Auto Neutrophil % : x  Auto Lymphocyte % : x  Auto Monocyte % : x  Auto Eosinophil % : x  Auto Basophil % : x    02-01    137  |  102  |  12  ----------------------------<  89  4.1   |  22  |  0.95    Ca    9.4      01 Feb 2022 07:46  Phos  4.1     02-01  Mg     1.50     02-01          VITALS  T(C): 36.3 (02-01-22 @ 05:24), Max: 36.8 (01-31-22 @ 21:44)  HR: 73 (02-01-22 @ 05:24) (71 - 91)  BP: 118/63 (02-01-22 @ 05:24) (118/63 - 134/77)  RR: 17 (02-01-22 @ 05:24) (17 - 18)  SpO2: 100% (02-01-22 @ 05:24) (97% - 100%)  Wt(kg): --    ALLERGIES  Ceclor (Unknown)  Toradol (Anaphylaxis; Swelling)  Toradol (Swelling)      MEDICATIONS   acetaminophen     Tablet .. 650 milliGRAM(s) Oral every 6 hours PRN  aluminum hydroxide/magnesium hydroxide/simethicone Suspension 30 milliLiter(s) Oral every 4 hours PRN  bictegravir 50 mG/emtricitabine 200 mG/tenofovir alafenamide 25 mG (BIKTARVY) 1 Tablet(s) Oral daily  enoxaparin Injectable 40 milliGRAM(s) SubCutaneous daily  gabapentin 300 milliGRAM(s) Oral three times a day  influenza   Vaccine 0.5 milliLiter(s) IntraMuscular once  LORazepam   Injectable 2 milliGRAM(s) IV Push once PRN  melatonin 3 milliGRAM(s) Oral at bedtime PRN  QUEtiapine 300 milliGRAM(s) Oral at bedtime      ----------------------------------------------------------------------------------------  PHYSICAL EXAM  Constitutional - NAD, Comfortable  HEENT - NCAT, EOMI  Neck - Supple, No limited ROM  Chest - CTA bilaterally, No wheeze, No rhonchi, No crackles  Cardiovascular - RRR, S1S2, No murmurs  Abdomen - BS+, Soft, NTND  Extremities - No C/C/E, No calf tenderness   Neurologic Exam -                    Cognitive - Awake, Alert, AAO to self, place, date, year, situation     Communication - Fluent, No dysarthria     Cranial Nerves - CN 2-12 intact     Motor -                      LEFT    UE -  5/5                    RIGHT UE -  5/5                    LEFT    LE - 2/5                    RIGHT LE - 2+/5         Balance - WNL Static  Psychiatric - Mood stable, Affect WNL  ----------------------------------------------------------------------------------------  ASSESSMENT/PLAN  32 yo m admitted for worsening LE weakness, possible GBS dx in the past however was not areflexic, had normal LP. Admitted for myopathy of unclear cause, possibly 2/2 HIV. imaging pending   MRI C and  T spine pending  Pain -tylenol, gabapentin  Diet: regular  DVT PPX - Lovenox  HIV: on Biktarvy   Rehab -    recommend inpatient rehab. Patient is homeless and does not have dispo for acute rehab.  Recommend subacute rehab when medically cleared, unless able to clarify housing plan after rehab stay.

## 2022-02-01 NOTE — PROGRESS NOTE ADULT - PROBLEM SELECTOR PLAN 4
Reported hx of GBS at [facility] in 2018. However, history and work-up inconsistent. On home baclofen and gabapentin, reportedly helps  - C/w gabapentin

## 2022-02-02 LAB
ANION GAP SERPL CALC-SCNC: 12 MMOL/L — SIGNIFICANT CHANGE UP (ref 7–14)
BUN SERPL-MCNC: 12 MG/DL — SIGNIFICANT CHANGE UP (ref 7–23)
CALCIUM SERPL-MCNC: 9.3 MG/DL — SIGNIFICANT CHANGE UP (ref 8.4–10.5)
CHLORIDE SERPL-SCNC: 102 MMOL/L — SIGNIFICANT CHANGE UP (ref 98–107)
CO2 SERPL-SCNC: 24 MMOL/L — SIGNIFICANT CHANGE UP (ref 22–31)
CREAT SERPL-MCNC: 0.92 MG/DL — SIGNIFICANT CHANGE UP (ref 0.5–1.3)
GLUCOSE SERPL-MCNC: 107 MG/DL — HIGH (ref 70–99)
HCT VFR BLD CALC: 39.6 % — SIGNIFICANT CHANGE UP (ref 39–50)
HGB BLD-MCNC: 12.6 G/DL — LOW (ref 13–17)
MAGNESIUM SERPL-MCNC: 1.5 MG/DL — LOW (ref 1.6–2.6)
MCHC RBC-ENTMCNC: 28.6 PG — SIGNIFICANT CHANGE UP (ref 27–34)
MCHC RBC-ENTMCNC: 31.8 GM/DL — LOW (ref 32–36)
MCV RBC AUTO: 90 FL — SIGNIFICANT CHANGE UP (ref 80–100)
NRBC # BLD: 0 /100 WBCS — SIGNIFICANT CHANGE UP
NRBC # FLD: 0 K/UL — SIGNIFICANT CHANGE UP
PHOSPHATE SERPL-MCNC: 3.8 MG/DL — SIGNIFICANT CHANGE UP (ref 2.5–4.5)
PLATELET # BLD AUTO: 252 K/UL — SIGNIFICANT CHANGE UP (ref 150–400)
POTASSIUM SERPL-MCNC: 3.7 MMOL/L — SIGNIFICANT CHANGE UP (ref 3.5–5.3)
POTASSIUM SERPL-SCNC: 3.7 MMOL/L — SIGNIFICANT CHANGE UP (ref 3.5–5.3)
RBC # BLD: 4.4 M/UL — SIGNIFICANT CHANGE UP (ref 4.2–5.8)
RBC # FLD: 12.2 % — SIGNIFICANT CHANGE UP (ref 10.3–14.5)
SODIUM SERPL-SCNC: 138 MMOL/L — SIGNIFICANT CHANGE UP (ref 135–145)
WBC # BLD: 3.86 K/UL — SIGNIFICANT CHANGE UP (ref 3.8–10.5)
WBC # FLD AUTO: 3.86 K/UL — SIGNIFICANT CHANGE UP (ref 3.8–10.5)

## 2022-02-02 PROCEDURE — 99232 SBSQ HOSP IP/OBS MODERATE 35: CPT | Mod: GC

## 2022-02-02 PROCEDURE — 99232 SBSQ HOSP IP/OBS MODERATE 35: CPT

## 2022-02-02 RX ORDER — MAGNESIUM OXIDE 400 MG ORAL TABLET 241.3 MG
400 TABLET ORAL ONCE
Refills: 0 | Status: COMPLETED | OUTPATIENT
Start: 2022-02-02 | End: 2022-02-02

## 2022-02-02 RX ADMIN — Medication 3 MILLIGRAM(S): at 21:48

## 2022-02-02 RX ADMIN — GABAPENTIN 300 MILLIGRAM(S): 400 CAPSULE ORAL at 21:48

## 2022-02-02 RX ADMIN — MAGNESIUM OXIDE 400 MG ORAL TABLET 400 MILLIGRAM(S): 241.3 TABLET ORAL at 11:34

## 2022-02-02 RX ADMIN — QUETIAPINE FUMARATE 300 MILLIGRAM(S): 200 TABLET, FILM COATED ORAL at 21:48

## 2022-02-02 RX ADMIN — BICTEGRAVIR SODIUM, EMTRICITABINE, AND TENOFOVIR ALAFENAMIDE FUMARATE 1 TABLET(S): 30; 120; 15 TABLET ORAL at 11:34

## 2022-02-02 RX ADMIN — Medication 650 MILLIGRAM(S): at 12:33

## 2022-02-02 RX ADMIN — Medication 650 MILLIGRAM(S): at 11:33

## 2022-02-02 RX ADMIN — GABAPENTIN 300 MILLIGRAM(S): 400 CAPSULE ORAL at 05:50

## 2022-02-02 RX ADMIN — GABAPENTIN 300 MILLIGRAM(S): 400 CAPSULE ORAL at 12:09

## 2022-02-02 NOTE — PROGRESS NOTE ADULT - PROBLEM SELECTOR PLAN 1
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 Extensive prior work-up and imaging inconsistent with myasthenia gravis and GBS. Differential diagnosis to consider hypokalemia, however, unlikely to explain this worsening LE weakness for past few years. 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 Patient previously refused d/t claustrophobia even with valium. Patient agreeable to trialing ativan prior to MRI. D/w MRI.  -- f/u ESR 16, A1c wnl, TSH wnl, B12 wnl, MMA low, homocysteine wnl  - ID consulted, appreciate recs  - hep c neg  - fall precaution  - PT/OT rec acute rehab - PMR consulted rec [facility] d/t undomiciled status  - There may be psychogenic/malingering component to his weakness. 1/28 in the AM, patient was moving his lower extremity when sleeping/ about to waking up but unable to move the extremity in the same manner during exam

## 2022-02-02 NOTE — PROGRESS NOTE ADULT - SUBJECTIVE AND OBJECTIVE BOX
Patient is a 31y old  Male who presents with a chief complaint of LE weakness (02 Feb 2022 07:27)    Interval history: reports headache and mild congestion today. Denies fever.   reports weakness unchanged.  states he was not able to find friend or family to stay with after rehab.    REVIEW OF SYSTEMS  +congestion, headache  +weakness    PAST MEDICAL & SURGICAL HISTORY  HIV (human immunodeficiency virus infection)    Guillain-[place]    Asthma    CVA (cerebral vascular accident)    Closed fracture of multiple ribs of right side, initial encounter    Cocaine abuse    Chronic sinusitis    Homeless    HIV disease    Stroke    History of orthopedic surgery    CURRENT FUNCTIONAL STATUS  2/1  Bed Mobility  Bed Mobility Training Rehab Potential: good, to achieve stated therapy goals  Bed Mobility Training Rolling/Turning: minimum assist (75% patient effort);  1 person assist;  nonverbal cues (demo/gestures);  verbal cues  Bed Mobility Training Scooting: minimum assist (75% patient effort);  1 person assist;  nonverbal cues (demo/gestures);  verbal cues  Bed Mobility Training Sit-to-Supine: minimum assist (75% patient effort);  1 person assist;  nonverbal cues (demo/gestures);  verbal cues  Bed Mobility Training Supine-to-Sit: minimum assist (75% patient effort);  1 person assist;  nonverbal cues (demo/gestures);  verbal cues  Bed Mobility Training Limitations: decreased ability to use arms for pushing/pulling;  decreased ability to use legs for bridging/pushing;  impaired ability to control trunk for mobility;  decreased strength;  impaired balance;  impaired postural control    Sit-Stand Transfer Training  Sit-to-Stand Transfer Training Rehab Potential: good, to achieve stated therapy goals  Transfer Training Sit-to-Stand Transfer: moderate assist (50% patient effort);  nonverbal cues (demo/gestures);  verbal cues;  1 person assist;  weight-bearing as tolerated   bilateral UE Support   Transfer Training Stand-to-Sit Transfer: moderate assist (50% patient effort);  2 person assist;  verbal cues;  nonverbal cues (demo/gestures);  weight-bearing as tolerated   bilateral UE support   Sit-to-Stand Transfer Training Transfer Safety Analysis: decreased weight-shifting ability;  decreased step length;  decreased strength;  impaired balance;  impaired postural control    Gait Training  Gait Training Rehab Potential: good, to achieve stated therapy goals  Gait Training: moderate assist (50% patient effort);  2 person assist;  nonverbal cues (demo/gestures);  verbal cues;  weight-bearing as tolerated   5 side steps ;  bilateral UE support   Gait Analysis: decreased cabrera;  decreased step length;  impaired balance;  decreased strength;  impaired postural control          FAMILY HISTORY   No pertinent family history in first degree relatives    Family history unknown    FH: HIV infection (Mother)        RECENT LABS/IMAGING  CBC Full  -  ( 02 Feb 2022 07:00 )  WBC Count : 3.86 K/uL  RBC Count : 4.40 M/uL  Hemoglobin : 12.6 g/dL  Hematocrit : 39.6 %  Platelet Count - Automated : 252 K/uL  Mean Cell Volume : 90.0 fL  Mean Cell Hemoglobin : 28.6 pg  Mean Cell Hemoglobin Concentration : 31.8 gm/dL  Auto Neutrophil # : x  Auto Lymphocyte # : x  Auto Monocyte # : x  Auto Eosinophil # : x  Auto Basophil # : x  Auto Neutrophil % : x  Auto Lymphocyte % : x  Auto Monocyte % : x  Auto Eosinophil % : x  Auto Basophil % : x    02-02    138  |  102  |  12  ----------------------------<  107<H>  3.7   |  24  |  0.92    Ca    9.3      02 Feb 2022 07:00  Phos  3.8     02-02  Mg     1.50     02-02          VITALS  T(C): 36.4 (02-02-22 @ 12:05), Max: 36.5 (02-01-22 @ 21:17)  HR: 92 (02-02-22 @ 12:05) (74 - 98)  BP: 134/84 (02-02-22 @ 12:05) (131/86 - 147/88)  RR: 18 (02-02-22 @ 12:05) (17 - 18)  SpO2: 100% (02-02-22 @ 12:05) (100% - 100%)  Wt(kg): --    ALLERGIES  Ceclor (Unknown)  Toradol (Anaphylaxis; Swelling)  Toradol (Swelling)      MEDICATIONS   acetaminophen     Tablet .. 650 milliGRAM(s) Oral every 6 hours PRN  aluminum hydroxide/magnesium hydroxide/simethicone Suspension 30 milliLiter(s) Oral every 4 hours PRN  bictegravir 50 mG/emtricitabine 200 mG/tenofovir alafenamide 25 mG (BIKTARVY) 1 Tablet(s) Oral daily  enoxaparin Injectable 40 milliGRAM(s) SubCutaneous daily  gabapentin 300 milliGRAM(s) Oral three times a day  influenza   Vaccine 0.5 milliLiter(s) IntraMuscular once  LORazepam   Injectable 2 milliGRAM(s) IV Push once PRN  melatonin 3 milliGRAM(s) Oral at bedtime PRN  QUEtiapine 300 milliGRAM(s) Oral at bedtime      ----------------------------------------------------------------------------------------   PHYSICAL EXAM  Constitutional - NAD, Comfortable  Chest - no respiratory distress  Cardiovascular - no edema  Abdomen - BS+, Soft, NTND  Extremities - No C/C/E, No calf tenderness   Neurologic Exam -                    Cognitive - Awake, Alert, AAO to self, place, date, year, situation     Communication - Fluent, No dysarthria     Cranial Nerves - CN 2-12 intact     Motor -                      LEFT    UE -  5/5                    RIGHT UE -  5/5                    LEFT    LE - 2/5                    RIGHT LE - 2+/5         Balance - WNL Static  Psychiatric - Mood stable, Affect WNL  ----------------------------------------------------------------------------------------  ASSESSMENT/PLAN  32 yo m admitted for worsening LE weakness, possible GBS dx in the past however was not areflexic, had normal LP. Admitted for myopathy of unclear cause, possibly 2/2 HIV   today with new complaint of headache and congestion- consider covid test  MRI C and  T spine pending  Pain -tylenol, gabapentin  Diet: regular  DVT PPX - Lovenox  HIV: on Biktarvy   Rehab -    recommend inpatient rehab. Patient is homeless and does not have dispo for acute rehab.  Recommend subacute rehab when medically cleared

## 2022-02-02 NOTE — PROGRESS NOTE ADULT - SUBJECTIVE AND OBJECTIVE BOX
Keena Bonilla MD    Internal Medicine Resident (PGY-1)  Pager: [phone] ([facility]) / [phone] ([facility])  ___________________________________________________________________________________________________      NELSON MITCHELL 31y Male    Overnight events/subjective: No acute overnight events. Patient seen and examined at bedside. No complaints. Denies fever, chills, chest pain, shortness of breath, abdominal pain, nausea, vomiting, changes in bowel habits, or urinary symptoms.    Vital Signs Last 24 Hrs  T(C): 36.4 (02 Feb 2022 05:47), Max: 36.5 (01 Feb 2022 21:17)  T(F): 97.6 (02 Feb 2022 05:47), Max: 97.7 (01 Feb 2022 21:17)  HR: 98 (02 Feb 2022 05:47) (74 - 98)  BP: 131/86 (02 Feb 2022 05:47) (131/86 - 147/88)  BP(mean): --  RR: 17 (02 Feb 2022 05:47) (17 - 18)  SpO2: 100% (02 Feb 2022 05:47) (100% - 100%)    PHYSICAL EXAM:  CONSTITUTIONAL: NAD, lying in bed comfortably  RESPIRATORY: Normal respiratory effort; CTABL  CARDIOVASCULAR: Regular rate and rhythm, normal S1 and S2, no murmur/rub/gallop  ABDOMEN: Soft, nondistended, nontender to palpation, normoactive bowel sounds, no rebound/guarding  MUSCLOSKELETAL: no joint swelling or tenderness to palpation  NEURO: AAOx3 to person, place, and time. 3/5 strength B/L in LE  EXTREMITIES: no pedal edema.    HOSPITAL MEDICATIONS:  MEDICATIONS  (STANDING):  bictegravir 50 mG/emtricitabine 200 mG/tenofovir alafenamide 25 mG (BIKTARVY) 1 Tablet(s) Oral daily  enoxaparin Injectable 40 milliGRAM(s) SubCutaneous daily  gabapentin 300 milliGRAM(s) Oral three times a day  influenza   Vaccine 0.5 milliLiter(s) IntraMuscular once  QUEtiapine 300 milliGRAM(s) Oral at bedtime    MEDICATIONS  (PRN):  acetaminophen     Tablet .. 650 milliGRAM(s) Oral every 6 hours PRN Temp greater or equal to 38C (100.4F), Mild Pain (1 - 3)  aluminum hydroxide/magnesium hydroxide/simethicone Suspension 30 milliLiter(s) Oral every 4 hours PRN Dyspepsia  LORazepam   Injectable 2 milliGRAM(s) IV Push once PRN Anxiety  melatonin 3 milliGRAM(s) Oral at bedtime PRN Insomnia      LABS:                        12.6   3.86  )-----------( 252      ( 02 Feb 2022 07:00 )             39.6     02-01    137  |  102  |  12  ----------------------------<  89  4.1   |  22  |  0.95    Ca    9.4      01 Feb 2022 07:46  Phos  4.1     02-01  Mg     1.50     02-01

## 2022-02-02 NOTE — PROGRESS NOTE ADULT - PROBLEM SELECTOR PLAN 4
Reported hx of GBS at [facility] in 2018. However, history and work-up inconsistent. On home baclofen and gabapentin, reportedly helps  - C/w gabapentin

## 2022-02-02 NOTE — PROGRESS NOTE ADULT - SUBJECTIVE AND OBJECTIVE BOX
Follow Up:      Inverval History/ROS:Patient is a 31y old  Male who presents with a chief complaint of LE weakness (02 Feb 2022 12:15)    No fever  C/o leg weakness- a little better      Allergies    Ceclor (Unknown)  Toradol (Anaphylaxis; Swelling)  Toradol (Swelling)    Intolerances        ANTIMICROBIALS:  bictegravir 50 mG/emtricitabine 200 mG/tenofovir alafenamide 25 mG (BIKTARVY) 1 daily      OTHER MEDS:  acetaminophen     Tablet .. 650 milliGRAM(s) Oral every 6 hours PRN  aluminum hydroxide/magnesium hydroxide/simethicone Suspension 30 milliLiter(s) Oral every 4 hours PRN  enoxaparin Injectable 40 milliGRAM(s) SubCutaneous daily  gabapentin 300 milliGRAM(s) Oral three times a day  influenza   Vaccine 0.5 milliLiter(s) IntraMuscular once  LORazepam   Injectable 2 milliGRAM(s) IV Push once PRN  melatonin 3 milliGRAM(s) Oral at bedtime PRN  QUEtiapine 300 milliGRAM(s) Oral at bedtime      Vital Signs Last 24 Hrs  T(C): 36.4 (02 Feb 2022 12:05), Max: 36.5 (01 Feb 2022 21:17)  T(F): 97.6 (02 Feb 2022 12:05), Max: 97.7 (01 Feb 2022 21:17)  HR: 92 (02 Feb 2022 12:05) (74 - 98)  BP: 134/84 (02 Feb 2022 12:05) (131/86 - 140/85)  BP(mean): --  RR: 18 (02 Feb 2022 12:05) (17 - 18)  SpO2: 100% (02 Feb 2022 12:05) (100% - 100%)    PHYSICAL EXAM:  General: x[ ] non-toxic  HEAD/EYES: [ ] PERRL [ ] white sclera [ ] icterus  ENT:  [ ] normal [ x] supple [ ] thrush [ ] pharyngeal exudate  Cardiovascular:   [ ] murmur x[ ] normal [ ] PPM/AICD  Respiratory:  [x ] clear to ausculation bilaterally  GI:  [x ] soft, non-tender, normal bowel sounds  :  [ ] gonzales [x ] no CVA tenderness   Musculoskeletal:  [ ] no synovitis  Neurologic:  [ ] non-focal exam   Skin:  [ x] no rash  Lymph: [x ] no lymphadenopathy  Psychiatric:  [ x] appropriate affect [ ] alert & oriented  Lines:  x ] no phlebitis [ ] central line                                12.6   3.86  )-----------( 252      ( 02 Feb 2022 07:00 )             39.6       02-02    138  |  102  |  12  ----------------------------<  107<H>  3.7   |  24  |  0.92    Ca    9.3      02 Feb 2022 07:00  Phos  3.8     02-02  Mg     1.50     02-02            MICROBIOLOGY:    RADIOLOGY:

## 2022-02-02 NOTE — PROGRESS NOTE ADULT - ASSESSMENT
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

## 2022-02-02 NOTE — PROGRESS NOTE ADULT - ASSESSMENT
31 year old with congential HIV with recurrent episodes of LE weakness with change in sensation  Pror work up without cause    1) HIV  continue biktarvy   CD 4 over 200 and 22 %  VL 29 K- ? adherence    Repeat HIV viral load around 2/10    Out pt follow up with his HIV provider    HIV myelopathy seen more often in end stage disease.  Would be less common at his state of disease    2) Concern for myelopathy  Follow up repeat imaging  Consider repeat MRI brain   Would he benefit from EMG?   neuro eval in progress

## 2022-02-03 ENCOUNTER — TRANSCRIPTION ENCOUNTER (OUTPATIENT)
Age: 32
End: 2022-02-03

## 2022-02-03 VITALS
SYSTOLIC BLOOD PRESSURE: 139 MMHG | OXYGEN SATURATION: 100 % | DIASTOLIC BLOOD PRESSURE: 75 MMHG | RESPIRATION RATE: 17 BRPM | TEMPERATURE: 98 F | HEART RATE: 85 BPM

## 2022-02-03 LAB
ANION GAP SERPL CALC-SCNC: 12 MMOL/L — SIGNIFICANT CHANGE UP (ref 7–14)
BUN SERPL-MCNC: 9 MG/DL — SIGNIFICANT CHANGE UP (ref 7–23)
CALCIUM SERPL-MCNC: 9.1 MG/DL — SIGNIFICANT CHANGE UP (ref 8.4–10.5)
CHLORIDE SERPL-SCNC: 100 MMOL/L — SIGNIFICANT CHANGE UP (ref 98–107)
CO2 SERPL-SCNC: 22 MMOL/L — SIGNIFICANT CHANGE UP (ref 22–31)
CREAT SERPL-MCNC: 0.9 MG/DL — SIGNIFICANT CHANGE UP (ref 0.5–1.3)
GLUCOSE SERPL-MCNC: 100 MG/DL — HIGH (ref 70–99)
HCT VFR BLD CALC: 39.6 % — SIGNIFICANT CHANGE UP (ref 39–50)
HGB BLD-MCNC: 12.6 G/DL — LOW (ref 13–17)
MAGNESIUM SERPL-MCNC: 1.5 MG/DL — LOW (ref 1.6–2.6)
MCHC RBC-ENTMCNC: 28.6 PG — SIGNIFICANT CHANGE UP (ref 27–34)
MCHC RBC-ENTMCNC: 31.8 GM/DL — LOW (ref 32–36)
MCV RBC AUTO: 89.8 FL — SIGNIFICANT CHANGE UP (ref 80–100)
NRBC # BLD: 0 /100 WBCS — SIGNIFICANT CHANGE UP
NRBC # FLD: 0 K/UL — SIGNIFICANT CHANGE UP
PHOSPHATE SERPL-MCNC: 3.8 MG/DL — SIGNIFICANT CHANGE UP (ref 2.5–4.5)
PLATELET # BLD AUTO: 241 K/UL — SIGNIFICANT CHANGE UP (ref 150–400)
POTASSIUM SERPL-MCNC: 3.7 MMOL/L — SIGNIFICANT CHANGE UP (ref 3.5–5.3)
POTASSIUM SERPL-SCNC: 3.7 MMOL/L — SIGNIFICANT CHANGE UP (ref 3.5–5.3)
RBC # BLD: 4.41 M/UL — SIGNIFICANT CHANGE UP (ref 4.2–5.8)
RBC # FLD: 12.2 % — SIGNIFICANT CHANGE UP (ref 10.3–14.5)
SODIUM SERPL-SCNC: 134 MMOL/L — LOW (ref 135–145)
WBC # BLD: 4.12 K/UL — SIGNIFICANT CHANGE UP (ref 3.8–10.5)
WBC # FLD AUTO: 4.12 K/UL — SIGNIFICANT CHANGE UP (ref 3.8–10.5)

## 2022-02-03 PROCEDURE — 99233 SBSQ HOSP IP/OBS HIGH 50: CPT | Mod: GC

## 2022-02-03 RX ORDER — QUETIAPINE FUMARATE 200 MG/1
1 TABLET, FILM COATED ORAL
Qty: 0 | Refills: 0 | DISCHARGE
Start: 2022-02-03

## 2022-02-03 RX ORDER — GABAPENTIN 400 MG/1
1 CAPSULE ORAL
Qty: 90 | Refills: 0
Start: 2022-02-03 | End: 2022-03-04

## 2022-02-03 RX ORDER — GABAPENTIN 400 MG/1
1 CAPSULE ORAL
Qty: 0 | Refills: 0 | DISCHARGE
Start: 2022-02-03

## 2022-02-03 RX ORDER — BICTEGRAVIR SODIUM, EMTRICITABINE, AND TENOFOVIR ALAFENAMIDE FUMARATE 30; 120; 15 MG/1; MG/1; MG/1
1 TABLET ORAL
Qty: 30 | Refills: 0
Start: 2022-02-03 | End: 2022-03-04

## 2022-02-03 RX ORDER — BICTEGRAVIR SODIUM, EMTRICITABINE, AND TENOFOVIR ALAFENAMIDE FUMARATE 30; 120; 15 MG/1; MG/1; MG/1
1 TABLET ORAL
Qty: 0 | Refills: 0 | DISCHARGE
Start: 2022-02-03

## 2022-02-03 RX ORDER — QUETIAPINE FUMARATE 200 MG/1
1 TABLET, FILM COATED ORAL
Qty: 0 | Refills: 0 | DISCHARGE

## 2022-02-03 RX ORDER — MAGNESIUM OXIDE 400 MG ORAL TABLET 241.3 MG
400 TABLET ORAL ONCE
Refills: 0 | Status: DISCONTINUED | OUTPATIENT
Start: 2022-02-03 | End: 2022-02-02

## 2022-02-03 RX ORDER — GABAPENTIN 400 MG/1
1 CAPSULE ORAL
Qty: 0 | Refills: 0 | DISCHARGE

## 2022-02-03 RX ORDER — QUETIAPINE FUMARATE 200 MG/1
1 TABLET, FILM COATED ORAL
Qty: 30 | Refills: 0
Start: 2022-02-03 | End: 2022-03-04

## 2022-02-03 RX ADMIN — BICTEGRAVIR SODIUM, EMTRICITABINE, AND TENOFOVIR ALAFENAMIDE FUMARATE 1 TABLET(S): 30; 120; 15 TABLET ORAL at 11:15

## 2022-02-03 RX ADMIN — Medication 2 MILLIGRAM(S): at 13:22

## 2022-02-03 RX ADMIN — GABAPENTIN 300 MILLIGRAM(S): 400 CAPSULE ORAL at 14:55

## 2022-02-03 NOTE — PROGRESS NOTE ADULT - PROVIDER SPECIALTY LIST ADULT
Internal Medicine
Infectious Disease
Rehab Medicine
Internal Medicine
Infectious Disease
Internal Medicine

## 2022-02-03 NOTE — PROGRESS NOTE ADULT - REASON FOR ADMISSION
LE weakness

## 2022-02-03 NOTE — DIETITIAN INITIAL EVALUATION ADULT. - OTHER INFO
NKFA. Pt homeless, lives at shelter. Pt continues on regular diet, no nutritionally related concerns at this time. No noted GI distress, last BM ?1/30 per flowsheets. No bowel regimen ordered. Dosing weight (1/27) 198.4 lbs. Recent chart weight (9/10/21) 193 lbs.

## 2022-02-03 NOTE — DIETITIAN INITIAL EVALUATION ADULT. - ADD RECOMMEND
2) Monitor BMs, provide bowel regimen as appropriate.                                                                                             3) Monitor PO intake, weight trends, nutrition related lab values, GI distress.

## 2022-02-03 NOTE — PROGRESS NOTE ADULT - SUBJECTIVE AND OBJECTIVE BOX
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 (03 Feb 2022 12:51)    No fever  Able to walk      Allergies    Ceclor (Unknown)  Toradol (Anaphylaxis; Swelling)  Toradol (Swelling)    Intolerances        ANTIMICROBIALS:  bictegravir 50 mG/emtricitabine 200 mG/tenofovir alafenamide 25 mG (BIKTARVY) 1 daily      OTHER MEDS:  acetaminophen     Tablet .. 650 milliGRAM(s) Oral every 6 hours PRN  aluminum hydroxide/magnesium hydroxide/simethicone Suspension 30 milliLiter(s) Oral every 4 hours PRN  enoxaparin Injectable 40 milliGRAM(s) SubCutaneous daily  gabapentin 300 milliGRAM(s) Oral three times a day  influenza   Vaccine 0.5 milliLiter(s) IntraMuscular once  magnesium oxide 400 milliGRAM(s) Oral once  melatonin 3 milliGRAM(s) Oral at bedtime PRN  QUEtiapine 300 milliGRAM(s) Oral at bedtime      Vital Signs Last 24 Hrs  T(C): 36.7 (03 Feb 2022 13:09), Max: 36.8 (02 Feb 2022 23:00)  T(F): 98.1 (03 Feb 2022 13:09), Max: 98.3 (02 Feb 2022 23:00)  HR: 85 (03 Feb 2022 13:09) (81 - 86)  BP: 139/75 (03 Feb 2022 13:09) (128/73 - 140/75)  BP(mean): --  RR: 17 (03 Feb 2022 13:09) (17 - 17)  SpO2: 100% (03 Feb 2022 13:09) (100% - 100%)    PHYSICAL EXAM:  General: [x ] non-toxic  HEAD/EYES: [ ] PERRL x[ ] white sclera [ ] icterus  ENT:  [ ] normal [x ] supple [ ] thrush [ ] pharyngeal exudate  Cardiovascular:   [ ] murmur [x ] normal [ ] PPM/AICD  Respiratory:  [x ] clear to ausculation bilaterally  GI:  x[ ] soft, non-tender, normal bowel sounds  :  [ ] gonzales [x ] no CVA tenderness   Musculoskeletal:  [ ] no synovitis  Neurologic:  [x ] strength exam is normal   Skin:  [ x] no rash  Lymph: [ ] no lymphadenopathy  Psychiatric:  [ ] appropriate affect [x ] alert & oriented  Lines:  [x ] no phlebitis [ ] central line                                12.6   4.12  )-----------( 241      ( 03 Feb 2022 07:11 )             39.6       02-03    134<L>  |  100  |  9   ----------------------------<  100<H>  3.7   |  22  |  0.90    Ca    9.1      03 Feb 2022 07:11  Phos  3.8     02-03  Mg     1.50     02-03            MICROBIOLOGY:    RADIOLOGY:

## 2022-02-03 NOTE — PROGRESS NOTE ADULT - PROBLEM SELECTOR PLAN 4
Reported hx of GBS at [facility] in 2018. However, history and work-up inconsistent. On home baclofen and gabapentin, reportedly helps  - C/w gabapentin

## 2022-02-03 NOTE — PROGRESS NOTE ADULT - SUBJECTIVE AND OBJECTIVE BOX
Keena Bonilla MD    Internal Medicine Resident (PGY-1)  Pager: [phone] ([facility]) / [phone] ([facility])  ___________________________________________________________________________________________________      NELSON MITCHELL 31y Male    Overnight events/subjective: No acute overnight events. Patient seen and examined at bedside. No complaints. Denies fever, chills, chest pain, shortness of breath, abdominal pain, nausea, vomiting, changes in bowel habits, or urinary symptoms.    Vital Signs Last 24 Hrs  T(C): 36.7 (03 Feb 2022 05:16), Max: 36.8 (02 Feb 2022 23:00)  T(F): 98 (03 Feb 2022 05:16), Max: 98.3 (02 Feb 2022 23:00)  HR: 86 (03 Feb 2022 05:16) (81 - 92)  BP: 128/73 (03 Feb 2022 05:16) (128/73 - 140/75)  BP(mean): --  RR: 17 (03 Feb 2022 05:16) (17 - 18)  SpO2: 100% (03 Feb 2022 05:16) (100% - 100%)    PHYSICAL EXAM:  CONSTITUTIONAL: NAD, lying in bed comfortably  RESPIRATORY: Normal respiratory effort; CTABL  CARDIOVASCULAR: Regular rate and rhythm, normal S1 and S2, no murmur/rub/gallop  ABDOMEN: Soft, nondistended, nontender to palpation, normoactive bowel sounds, no rebound/guarding  MUSCLOSKELETAL: no joint swelling or tenderness to palpation  NEURO: AAOx3 to person, place, and time. 3/5 strength B/L in LE  EXTREMITIES: no pedal edema.    HOSPITAL MEDICATIONS:  MEDICATIONS  (STANDING):  bictegravir 50 mG/emtricitabine 200 mG/tenofovir alafenamide 25 mG (BIKTARVY) 1 Tablet(s) Oral daily  enoxaparin Injectable 40 milliGRAM(s) SubCutaneous daily  gabapentin 300 milliGRAM(s) Oral three times a day  influenza   Vaccine 0.5 milliLiter(s) IntraMuscular once  QUEtiapine 300 milliGRAM(s) Oral at bedtime    MEDICATIONS  (PRN):  acetaminophen     Tablet .. 650 milliGRAM(s) Oral every 6 hours PRN Temp greater or equal to 38C (100.4F), Mild Pain (1 - 3)  aluminum hydroxide/magnesium hydroxide/simethicone Suspension 30 milliLiter(s) Oral every 4 hours PRN Dyspepsia  LORazepam   Injectable 2 milliGRAM(s) IV Push once PRN Anxiety  melatonin 3 milliGRAM(s) Oral at bedtime PRN Insomnia      LABS:                        12.6   3.86  )-----------( 252      ( 02 Feb 2022 07:00 )             39.6     02-02    138  |  102  |  12  ----------------------------<  107<H>  3.7   |  24  |  0.92    Ca    9.3      02 Feb 2022 07:00  Phos  3.8     02-02  Mg     1.50     02-02             Keena Bonilla MD    Internal Medicine Resident (PGY-1)  Pager: [phone] ([facility]) / [phone] ([facility])  ___________________________________________________________________________________________________      NELSON MITCHELL 31y Male    Overnight events/subjective: No acute overnight events. Patient seen and examined at bedside. No complaints. Denies fever, chills, chest pain, shortness of breath, abdominal pain, nausea, vomiting, changes in bowel habits, or urinary symptoms. Feels his BLE weakness is improving.    Vital Signs Last 24 Hrs  T(C): 36.7 (03 Feb 2022 05:16), Max: 36.8 (02 Feb 2022 23:00)  T(F): 98 (03 Feb 2022 05:16), Max: 98.3 (02 Feb 2022 23:00)  HR: 86 (03 Feb 2022 05:16) (81 - 92)  BP: 128/73 (03 Feb 2022 05:16) (128/73 - 140/75)  BP(mean): --  RR: 17 (03 Feb 2022 05:16) (17 - 18)  SpO2: 100% (03 Feb 2022 05:16) (100% - 100%)    PHYSICAL EXAM:  CONSTITUTIONAL: NAD, lying in bed comfortably  RESPIRATORY: Normal respiratory effort; CTABL  CARDIOVASCULAR: Regular rate and rhythm, normal S1 and S2, no murmur/rub/gallop  ABDOMEN: Soft, nondistended, nontender to palpation, normoactive bowel sounds, no rebound/guarding  MUSCLOSKELETAL: no joint swelling or tenderness to palpation  NEURO: AAOx3 to person, place, and time. 3/5 strength B/L in LE  EXTREMITIES: no pedal edema.    HOSPITAL MEDICATIONS:  MEDICATIONS  (STANDING):  bictegravir 50 mG/emtricitabine 200 mG/tenofovir alafenamide 25 mG (BIKTARVY) 1 Tablet(s) Oral daily  enoxaparin Injectable 40 milliGRAM(s) SubCutaneous daily  gabapentin 300 milliGRAM(s) Oral three times a day  influenza   Vaccine 0.5 milliLiter(s) IntraMuscular once  QUEtiapine 300 milliGRAM(s) Oral at bedtime    MEDICATIONS  (PRN):  acetaminophen     Tablet .. 650 milliGRAM(s) Oral every 6 hours PRN Temp greater or equal to 38C (100.4F), Mild Pain (1 - 3)  aluminum hydroxide/magnesium hydroxide/simethicone Suspension 30 milliLiter(s) Oral every 4 hours PRN Dyspepsia  LORazepam   Injectable 2 milliGRAM(s) IV Push once PRN Anxiety  melatonin 3 milliGRAM(s) Oral at bedtime PRN Insomnia      LABS:                        12.6   3.86  )-----------( 252      ( 02 Feb 2022 07:00 )             39.6     02-02    138  |  102  |  12  ----------------------------<  107<H>  3.7   |  24  |  0.92    Ca    9.3      02 Feb 2022 07:00  Phos  3.8     02-02  Mg     1.50     02-02             Keena Bonilla MD    Internal Medicine Resident (PGY-1)  Pager: [phone] ([facility]) / [phone] ([facility])  ___________________________________________________________________________________________________      NELSON MITCHELL 31y Male    Overnight events/subjective: No acute overnight events. Patient seen and examined at bedside. No complaints. Denies fever, chills, chest pain, shortness of breath, abdominal pain, nausea, vomiting, changes in bowel habits, or urinary symptoms. Feels his BLE weakness is improving.    Patient states that he no longer wishes to go to rehab. Feels his BLE weakness has improved. States he walked earlier today.    Vital Signs Last 24 Hrs  T(C): 36.7 (03 Feb 2022 05:16), Max: 36.8 (02 Feb 2022 23:00)  T(F): 98 (03 Feb 2022 05:16), Max: 98.3 (02 Feb 2022 23:00)  HR: 86 (03 Feb 2022 05:16) (81 - 92)  BP: 128/73 (03 Feb 2022 05:16) (128/73 - 140/75)  BP(mean): --  RR: 17 (03 Feb 2022 05:16) (17 - 18)  SpO2: 100% (03 Feb 2022 05:16) (100% - 100%)    PHYSICAL EXAM:  CONSTITUTIONAL: NAD, lying in bed comfortably  RESPIRATORY: Normal respiratory effort; CTABL  CARDIOVASCULAR: Regular rate and rhythm, normal S1 and S2, no murmur/rub/gallop  ABDOMEN: Soft, nondistended, nontender to palpation, normoactive bowel sounds, no rebound/guarding  MUSCLOSKELETAL: no joint swelling or tenderness to palpation  NEURO: AAOx3 to person, place, and time. 3/5 strength B/L in LE  EXTREMITIES: no pedal edema.    HOSPITAL MEDICATIONS:  MEDICATIONS  (STANDING):  bictegravir 50 mG/emtricitabine 200 mG/tenofovir alafenamide 25 mG (BIKTARVY) 1 Tablet(s) Oral daily  enoxaparin Injectable 40 milliGRAM(s) SubCutaneous daily  gabapentin 300 milliGRAM(s) Oral three times a day  influenza   Vaccine 0.5 milliLiter(s) IntraMuscular once  QUEtiapine 300 milliGRAM(s) Oral at bedtime    MEDICATIONS  (PRN):  acetaminophen     Tablet .. 650 milliGRAM(s) Oral every 6 hours PRN Temp greater or equal to 38C (100.4F), Mild Pain (1 - 3)  aluminum hydroxide/magnesium hydroxide/simethicone Suspension 30 milliLiter(s) Oral every 4 hours PRN Dyspepsia  LORazepam   Injectable 2 milliGRAM(s) IV Push once PRN Anxiety  melatonin 3 milliGRAM(s) Oral at bedtime PRN Insomnia      LABS:                        12.6   3.86  )-----------( 252      ( 02 Feb 2022 07:00 )             39.6     02-02    138  |  102  |  12  ----------------------------<  107<H>  3.7   |  24  |  0.92    Ca    9.3      02 Feb 2022 07:00  Phos  3.8     02-02  Mg     1.50     02-02

## 2022-02-03 NOTE — PROGRESS NOTE ADULT - PROBLEM SELECTOR PROBLEM 4
GBS (Guillain [place] syndrome)
GBS (Guillain [place] syndrome)
GBS (Guillain [place] syndrome)
GBS (Guillain [place] syndrome)
GBS (Guillain [place] syndrome)
GBS (Guillain [place] syndrome)
GBS (Guillain [place] syndrome)

## 2022-02-03 NOTE — PROGRESS NOTE ADULT - PROBLEM SELECTOR PLAN 1
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 Extensive prior work-up and imaging inconsistent with myasthenia gravis and GBS. Differential diagnosis to consider hypokalemia, however, unlikely to explain this worsening LE weakness for past few years. 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 Patient previously refused d/t claustrophobia even with valium. Patient agreeable to trialing ativan prior to MRI. D/w MRI.  -- f/u ESR 16, A1c wnl, TSH wnl, B12 wnl, MMA low, homocysteine wnl  - ID consulted, appreciate recs  - hep c neg  - fall precaution  - PT/OT rec acute rehab - PMR consulted rec [facility] d/t undomiciled status  - There may be psychogenic component to his weakness. 1/28 in the AM, patient was moving his lower extremity when sleeping/ about to waking up but unable to move the extremity in the same manner during exam

## 2022-02-03 NOTE — DISCHARGE NOTE NURSING/CASE MANAGEMENT/SOCIAL WORK - NSDCPEFALRISK_GEN_ALL_CORE
For information on Fall & Injury Prevention, visit: [email or web address] OR  [email or web address] OR  https://www.cdc.gov/steadi/patient.html

## 2022-02-03 NOTE — PROGRESS NOTE ADULT - ATTENDING COMMENTS
31 year old with congential HIV with recurrent episodes of LE weakness with change in sensation  Pror work up without cause    1) HIV  continue biktarvy   CD 4 over 200 and 22 %  Check viral load    Out pt follow up with his HIV provider    HIV myelopathy seen more often in end stage disease.  Would be less common at his state of disease    2) Concern for myelopathy  Follow up repeat imaging  Consider repeat MRI brain   Would he benefit from EMG?   neuro eval in progress    Sulaiman Hercules MD  [phone]  pager [phone]     Call the ID service with questions or concerns over the weekend.  [phone]
awaiting MRI.
pt continues to have b/l LE weakness - awaiting MRI.
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  pending MRI C, Brain, T spine.   hep C virology   Correct electrolytes, f/w labs .
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    Patient pending MRI N/T spine   Correct elecrtrolytes K, mag   F/w labs   PT eval after MRI.
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  Now pending MRI C/T spine, brain   Correct electrolytes  F/w labs
Pt ambulating without difficulty - says he has regained his strength.  Will do MRI and if no acute pathology then would d/c.  discharge planning and coordination ~ 45mins.
Doubt this is HIV myelopathy given relatively well controlled HIV.  appreciate ID and neuro recs - will plan to get MRI C/T spine w/ and w/o contrast - if pt unable to with ativan then would need to arrange with sedation by anesthesia.  PMR eval for acute rehab.

## 2022-02-03 NOTE — DISCHARGE NOTE NURSING/CASE MANAGEMENT/SOCIAL WORK - NSDCFUADDAPPT_GEN_ALL_CORE_FT
Please follow up with your infectious disease doctor and neurologist upon discharge.    Please call the Medicine Specialties at [place] to establish a primary care provider.

## 2022-02-03 NOTE — DISCHARGE NOTE NURSING/CASE MANAGEMENT/SOCIAL WORK - PATIENT PORTAL LINK FT
You can access the FollowMyHealth Patient Portal offered by [facility] by registering at the following website: [email or web address]. By joining [employer]’s FollowMyHealth portal, you will also be able to view your health information using other applications (apps) compatible with our system.

## 2022-02-03 NOTE — PROGRESS NOTE ADULT - PROBLEM SELECTOR PROBLEM 1
Lower extremity weakness

## 2022-02-03 NOTE — PROGRESS NOTE ADULT - ASSESSMENT
31 year old with congential HIV with recurrent episodes of LE weakness with change in sensation  Pror work up without cause    1) HIV  continue biktarvy   CD 4 over 200 and 22 %  VL 29 K- ? adherence    Repeat HIV viral load around 2/10    Out pt follow up with his HIV provider- next week    HIV myelopathy seen more often in end stage disease.  Would be less common at his state of disease    2) Concern for myelopathy  Follow up repeat imaging  Consider repeat MRI brain   Would he benefit from EMG?   neuro eval in progress

## 2022-02-03 NOTE — DIETITIAN INITIAL EVALUATION ADULT. - REASON FOR ADMISSION
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

## 2022-02-03 NOTE — PROGRESS NOTE ADULT - PROBLEM SELECTOR PLAN 5
DVT: lovenox  Diet: Regular  Dispo: PT rec acute rehab, however PMR rec [facility] d/t undomiciled status, lack of dispo plan after rehab DVT: lovenox  Diet: Regular  Dispo: PT rec acute rehab, however PMR rec [facility] d/t undomiciled status, lack of dispo plan after rehab; patient no longer wishes to go to rehab - states he has a residence in [place] that he wishes to return to.

## 2022-02-03 NOTE — PROGRESS NOTE ADULT - ASSESSMENT
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

## 2022-02-11 NOTE — ED PROVIDER NOTE - NS ED SCRIBE STATEMENT
Mammo order placed. Patient notified nothing available sooner for annual. Patient verbalized understanding. Attending

## 2022-02-13 ENCOUNTER — EMERGENCY (EMERGENCY)
Facility: HOSPITAL | Age: 32
LOS: 1 days | Discharge: HOME | End: 2022-02-13
Attending: EMERGENCY MEDICINE | Admitting: EMERGENCY MEDICINE
Payer: SELF-PAY

## 2022-02-13 VITALS
HEART RATE: 104 BPM | RESPIRATION RATE: 18 BRPM | DIASTOLIC BLOOD PRESSURE: 99 MMHG | OXYGEN SATURATION: 99 % | SYSTOLIC BLOOD PRESSURE: 158 MMHG | TEMPERATURE: 98 F

## 2022-02-13 DIAGNOSIS — Z98.890 OTHER SPECIFIED POSTPROCEDURAL STATES: Chronic | ICD-10-CM

## 2022-02-13 DIAGNOSIS — F10.129 ALCOHOL ABUSE WITH INTOXICATION, UNSPECIFIED: ICD-10-CM

## 2022-02-13 DIAGNOSIS — F19.920 OTHER PSYCHOACTIVE SUBSTANCE USE, UNSPECIFIED WITH INTOXICATION, UNCOMPLICATED: ICD-10-CM

## 2022-02-13 DIAGNOSIS — Z59.00 HOMELESSNESS UNSPECIFIED: ICD-10-CM

## 2022-02-13 PROCEDURE — 99284 EMERGENCY DEPT VISIT MOD MDM: CPT

## 2022-02-13 PROCEDURE — 99053 MED SERV 10PM-8AM 24 HR FAC: CPT

## 2022-02-13 SDOH — ECONOMIC STABILITY - HOUSING INSECURITY: HOMELESSNESS UNSPECIFIED: Z59.00

## 2022-02-13 NOTE — ED ADULT NURSE REASSESSMENT NOTE - GENERAL PATIENT STATE
resting/sleeping Quality 128: Preventive Care And Screening: Body Mass Index (Bmi) Screening And Follow-Up Plan: BMI is documented within normal parameters and no follow-up plan is required. Quality 226: Preventive Care And Screening: Tobacco Use: Screening And Cessation Intervention: Patient screened for tobacco use and is an ex/non-smoker Quality 130: Documentation Of Current Medications In The Medical Record: Current Medications Documented Detail Level: Detailed Quality 431: Preventive Care And Screening: Unhealthy Alcohol Use - Screening: Patient screened for unhealthy alcohol use using a single question and scores less than 2 times per year Quality 110: Preventive Care And Screening: Influenza Immunization: Influenza Immunization Administered during Influenza season

## 2022-02-13 NOTE — ED PROVIDER NOTE - OBJECTIVE STATEMENT
31 M presenting with R leg twitches x a long time. He is also high. He said he had an edible Oreo. Patient is too high to have a coherent conversation. Appears homeless. Dn take medications for the twitching. Only twitches leg when provider approaches the chair and interacts with him. He told triage that he has a syndrome that leads to leg twitching.

## 2022-02-13 NOTE — ED ADULT TRIAGE NOTE - CHIEF COMPLAINT QUOTE
pt presents with leg discomfort and twitching. reports that this is part of a syndrome, which causes him to occasionally be incontinent. ambulatory on scene per ems.

## 2022-02-13 NOTE — ED PROVIDER NOTE - NSFOLLOWUPINSTRUCTIONS_ED_ALL_ED_FT
Please get help for alcohol abuse if you drink heavily or use drugs on a regular basis.     [facility] is a good referral line for crisis and substance abuse help.  [facility] has drop in programs all over the [facility].    Return to the ER for Emergencies.     [facility]: [phone]   [facility] Substance Abuse Services: [phone], option #2   Methadone Maintenance & Ambulatory Opiate Detox: [phone]  [facility]: [phone]  [facility]: [phone]  [facility]: [phone]    [facility]: [phone], option #2   [facility]: [phone]    [facility]: [phone]    [facility] Central Intake: [phone]  [facility] Chemical Dependency/Ancillary Withdrawal: [phone]  [facility] Methadone Maintenance: [phone]    [facility]: [phone]  [facility] Addiction Treatment Services: [phone]    [facility] HopeLine: 8-753-9-HOPEHealth system Office of Alcoholism and Substance Abuse Services (OASAS): https://www.oasas.[place].gov/providerdirectory/  [facility] for Addiction Services and Psychotherapy Interventions Research (CASPIR)  www.[facility].org     Interested in discussing options to reduce your tobacco use?    [facility] for Tobacco Control:  [phone]  [facility] QUITLINE: 8-104-BP-QUITS ([phone])    Interested in learning more about substance use?      http://rethinkingdrinking.niaaa.nih.gov   https://www.drugabuse.gov/patients-families     Learn more about opioid overdose prevention programs in [place]:  http://www.health.[place].gov/diseases/aids/general/opioid_overdose_prevention/

## 2022-02-13 NOTE — ED ADULT NURSE REASSESSMENT NOTE - NS ED NURSE REASSESS COMMENT FT1
Transfer of care acknowledged with bedside rounding, lab results reviewed, will continue to monitor.  in nad, resting/sleeping comfortably on stretcher. even/unlabored respirations Transfer of care acknowledged with bedside rounding, lab results reviewed, will continue to monitor.  in nad, resting/sleeping comfortably in chair. even/unlabored respirations

## 2022-02-13 NOTE — ED ADULT NURSE NOTE - NSIMPLEMENTINTERV_GEN_ALL_ED
Implemented All Fall Risk Interventions:  [place] to call system. Call bell, personal items and telephone within reach. Instruct patient to call for assistance. Room bathroom lighting operational. Non-slip footwear when patient is off stretcher. Physically safe environment: no spills, clutter or unnecessary equipment. Stretcher in lowest position, wheels locked, appropriate side rails in place. Provide visual cue, wrist band, yellow gown, etc. Monitor gait and stability. Monitor for mental status changes and reorient to person, place, and time. Review medications for side effects contributing to fall risk. Reinforce activity limits and safety measures with patient and family.

## 2022-02-13 NOTE — ED PROVIDER NOTE - CLINICAL SUMMARY MEDICAL DECISION MAKING FREE TEXT BOX
Patient presenting with drug intoxication- unclear if leg twitching is voluntary or involuntary and worse when awake (as he doses off). Will observe until more sober/steady.

## 2022-02-13 NOTE — ED PROVIDER NOTE - PHYSICAL EXAMINATION
Const: Moderate/severe intox, fair hygiene  ENT: Airway patent, protecting airway. Nasal mucosa clear.   Eyes: Clear bilaterally, pupils equal, round 3mm  MSK: No signs of acute trauma or injury.   Neuro: Awake, alert, normal tone, LAWSON, answers all questions, slight slurred speech, gait steady. Right leg only seems to jerk when provider is talking to him.   Skin: No signs of acute trauma  Psych: Moderate/severe intox, mostly cooperative, rambling at times incoherently

## 2022-02-13 NOTE — ED PROVIDER NOTE - PATIENT PORTAL LINK FT
You can access the FollowMyHealth Patient Portal offered by [facility] by registering at the following website: [email or web address]. By joining [facility]’s FollowMyHealth portal, you will also be able to view your health information using other applications (apps) compatible with our system.

## 2022-02-21 NOTE — PATIENT PROFILE ADULT - NSPROMEDSPATCH_GEN_A_NUR
Pt admitted to room 3302 from ED via stretcher with RNGiovani MURDOCK. Pt is very sleepy but will answer questions, disoriented at times. POC updated with pt, all questions answered. Oriented pt to room and call light. Call light and bedside table within reach. Instructed to call out with any needs, v/u. Will monitor. none

## 2022-02-27 ENCOUNTER — EMERGENCY (EMERGENCY)
Facility: HOSPITAL | Age: 32
LOS: 1 days | Discharge: HOME | End: 2022-02-27
Admitting: EMERGENCY MEDICINE
Payer: MEDICAID

## 2022-02-27 DIAGNOSIS — Z98.890 OTHER SPECIFIED POSTPROCEDURAL STATES: Chronic | ICD-10-CM

## 2022-02-27 PROCEDURE — 72125 CT NECK SPINE W/O DYE: CPT | Mod: 26

## 2022-02-27 PROCEDURE — 99285 EMERGENCY DEPT VISIT HI MDM: CPT

## 2022-02-27 PROCEDURE — 71045 X-RAY EXAM CHEST 1 VIEW: CPT | Mod: 26

## 2022-02-27 PROCEDURE — 70450 CT HEAD/BRAIN W/O DYE: CPT | Mod: 26

## 2022-02-27 PROCEDURE — 70486 CT MAXILLOFACIAL W/O DYE: CPT | Mod: 26

## 2022-02-27 PROCEDURE — 93010 ELECTROCARDIOGRAM REPORT: CPT

## 2022-02-28 ENCOUNTER — INPATIENT (INPATIENT)
Facility: HOSPITAL | Age: 32
LOS: 3 days | Discharge: NURSING, REHAB OR LONG-TERM CARE | DRG: 193 | End: 2022-03-04
Attending: STUDENT IN AN ORGANIZED HEALTH CARE EDUCATION/TRAINING PROGRAM | Admitting: INTERNAL MEDICINE
Payer: COMMERCIAL

## 2022-02-28 VITALS
SYSTOLIC BLOOD PRESSURE: 137 MMHG | TEMPERATURE: 102 F | HEIGHT: 72 IN | OXYGEN SATURATION: 97 % | DIASTOLIC BLOOD PRESSURE: 78 MMHG | RESPIRATION RATE: 16 BRPM | HEART RATE: 110 BPM

## 2022-02-28 DIAGNOSIS — J18.9 PNEUMONIA, UNSPECIFIED ORGANISM: ICD-10-CM

## 2022-02-28 DIAGNOSIS — Z98.890 OTHER SPECIFIED POSTPROCEDURAL STATES: Chronic | ICD-10-CM

## 2022-02-28 LAB
ALBUMIN SERPL ELPH-MCNC: 4.1 G/DL — SIGNIFICANT CHANGE UP (ref 3.3–5.2)
ALP SERPL-CCNC: 85 U/L — SIGNIFICANT CHANGE UP (ref 40–120)
ALT FLD-CCNC: 26 U/L — SIGNIFICANT CHANGE UP
ANION GAP SERPL CALC-SCNC: 14 MMOL/L — SIGNIFICANT CHANGE UP (ref 5–17)
APTT BLD: 34 SEC — SIGNIFICANT CHANGE UP (ref 27.5–35.5)
AST SERPL-CCNC: 36 U/L — SIGNIFICANT CHANGE UP
BASOPHILS # BLD AUTO: 0.02 K/UL — SIGNIFICANT CHANGE UP (ref 0–0.2)
BASOPHILS NFR BLD AUTO: 0.3 % — SIGNIFICANT CHANGE UP (ref 0–2)
BILIRUB SERPL-MCNC: 0.8 MG/DL — SIGNIFICANT CHANGE UP (ref 0.4–2)
BUN SERPL-MCNC: 8.7 MG/DL — SIGNIFICANT CHANGE UP (ref 8–20)
CALCIUM SERPL-MCNC: 8.7 MG/DL — SIGNIFICANT CHANGE UP (ref 8.6–10.2)
CHLORIDE SERPL-SCNC: 96 MMOL/L — LOW (ref 98–107)
CO2 SERPL-SCNC: 24 MMOL/L — SIGNIFICANT CHANGE UP (ref 22–29)
CREAT SERPL-MCNC: 1.05 MG/DL — SIGNIFICANT CHANGE UP (ref 0.5–1.3)
EGFR: 97 ML/MIN/1.73M2 — SIGNIFICANT CHANGE UP
EOSINOPHIL # BLD AUTO: 0.01 K/UL — SIGNIFICANT CHANGE UP (ref 0–0.5)
EOSINOPHIL NFR BLD AUTO: 0.2 % — SIGNIFICANT CHANGE UP (ref 0–6)
GLUCOSE SERPL-MCNC: 90 MG/DL — SIGNIFICANT CHANGE UP (ref 70–99)
HCT VFR BLD CALC: 39.7 % — SIGNIFICANT CHANGE UP (ref 39–50)
HGB BLD-MCNC: 12.8 G/DL — LOW (ref 13–17)
IMM GRANULOCYTES NFR BLD AUTO: 0.3 % — SIGNIFICANT CHANGE UP (ref 0–1.5)
INR BLD: 1.32 RATIO — HIGH (ref 0.88–1.16)
LYMPHOCYTES # BLD AUTO: 2.2 K/UL — SIGNIFICANT CHANGE UP (ref 1–3.3)
LYMPHOCYTES # BLD AUTO: 37.4 % — SIGNIFICANT CHANGE UP (ref 13–44)
MCHC RBC-ENTMCNC: 28.5 PG — SIGNIFICANT CHANGE UP (ref 27–34)
MCHC RBC-ENTMCNC: 32.2 GM/DL — SIGNIFICANT CHANGE UP (ref 32–36)
MCV RBC AUTO: 88.4 FL — SIGNIFICANT CHANGE UP (ref 80–100)
MONOCYTES # BLD AUTO: 0.72 K/UL — SIGNIFICANT CHANGE UP (ref 0–0.9)
MONOCYTES NFR BLD AUTO: 12.2 % — SIGNIFICANT CHANGE UP (ref 2–14)
NEUTROPHILS # BLD AUTO: 2.92 K/UL — SIGNIFICANT CHANGE UP (ref 1.8–7.4)
NEUTROPHILS NFR BLD AUTO: 49.6 % — SIGNIFICANT CHANGE UP (ref 43–77)
PLATELET # BLD AUTO: 231 K/UL — SIGNIFICANT CHANGE UP (ref 150–400)
POTASSIUM SERPL-MCNC: 3.2 MMOL/L — LOW (ref 3.5–5.3)
POTASSIUM SERPL-SCNC: 3.2 MMOL/L — LOW (ref 3.5–5.3)
PROT SERPL-MCNC: 7.8 G/DL — SIGNIFICANT CHANGE UP (ref 6.6–8.7)
PROTHROM AB SERPL-ACNC: 15.4 SEC — HIGH (ref 10.5–13.4)
RBC # BLD: 4.49 M/UL — SIGNIFICANT CHANGE UP (ref 4.2–5.8)
RBC # FLD: 12.6 % — SIGNIFICANT CHANGE UP (ref 10.3–14.5)
SODIUM SERPL-SCNC: 134 MMOL/L — LOW (ref 135–145)
WBC # BLD: 5.89 K/UL — SIGNIFICANT CHANGE UP (ref 3.8–10.5)
WBC # FLD AUTO: 5.89 K/UL — SIGNIFICANT CHANGE UP (ref 3.8–10.5)

## 2022-02-28 PROCEDURE — 70450 CT HEAD/BRAIN W/O DYE: CPT | Mod: 26,MA

## 2022-02-28 PROCEDURE — 99285 EMERGENCY DEPT VISIT HI MDM: CPT

## 2022-02-28 PROCEDURE — 93010 ELECTROCARDIOGRAM REPORT: CPT

## 2022-02-28 PROCEDURE — 71260 CT THORAX DX C+: CPT | Mod: 26,MA

## 2022-02-28 PROCEDURE — 74177 CT ABD & PELVIS W/CONTRAST: CPT | Mod: 26,MA

## 2022-02-28 PROCEDURE — 71045 X-RAY EXAM CHEST 1 VIEW: CPT | Mod: 26

## 2022-02-28 PROCEDURE — 72131 CT LUMBAR SPINE W/O DYE: CPT | Mod: 26,MA

## 2022-02-28 RX ORDER — MEROPENEM 1 G/30ML
2 INJECTION INTRAVENOUS ONCE
Refills: 0 | Status: DISCONTINUED | OUTPATIENT
Start: 2022-02-28 | End: 2022-02-28

## 2022-02-28 RX ORDER — MEROPENEM 1 G/30ML
2000 INJECTION INTRAVENOUS ONCE
Refills: 0 | Status: COMPLETED | OUTPATIENT
Start: 2022-02-28 | End: 2022-02-28

## 2022-02-28 RX ORDER — SODIUM CHLORIDE 9 MG/ML
2300 INJECTION INTRAMUSCULAR; INTRAVENOUS; SUBCUTANEOUS ONCE
Refills: 0 | Status: COMPLETED | OUTPATIENT
Start: 2022-02-28 | End: 2022-02-28

## 2022-02-28 RX ORDER — VANCOMYCIN HCL 1 G
1000 VIAL (EA) INTRAVENOUS ONCE
Refills: 0 | Status: COMPLETED | OUTPATIENT
Start: 2022-02-28 | End: 2022-02-28

## 2022-02-28 RX ORDER — ACETAMINOPHEN 500 MG
650 TABLET ORAL ONCE
Refills: 0 | Status: COMPLETED | OUTPATIENT
Start: 2022-02-28 | End: 2022-02-28

## 2022-02-28 RX ADMIN — MEROPENEM 200 MILLIGRAM(S): 1 INJECTION INTRAVENOUS at 22:47

## 2022-02-28 RX ADMIN — Medication 250 MILLIGRAM(S): at 23:56

## 2022-02-28 RX ADMIN — SODIUM CHLORIDE 2300 MILLILITER(S): 9 INJECTION INTRAMUSCULAR; INTRAVENOUS; SUBCUTANEOUS at 22:47

## 2022-02-28 RX ADMIN — Medication 650 MILLIGRAM(S): at 21:46

## 2022-02-28 NOTE — ED PROVIDER NOTE - PROGRESS NOTE DETAILS
Given the significant and immediate threats to this patient based on initial presentation, the benefits of emergency contrast-enhanced CT imaging without obtaining GFR/creatinine serum level results greatly outweigh the potential risk of harm due to contrast-induced nephropathy. Workup thusfar notable for pneumonia, admit to medicine with HIV labs and spine MR pending.

## 2022-02-28 NOTE — ED PROVIDER NOTE - CLINICAL SUMMARY MEDICAL DECISION MAKING FREE TEXT BOX
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  Patient given IV fluids and abx medication.  EKG labs and imaging performed to evaluate the patient.

## 2022-02-28 NOTE — ED ADULT NURSE NOTE - OBJECTIVE STATEMENT
Pt pw c/o LLE pain/weakness similar to HIV myalgias in the past. patient states that he is having an HIV flare up and is weak on his left side. weakness started around 11am-12. code stroke called from triage. Pt also endorses fevers and cough x2-3 days.

## 2022-02-28 NOTE — ED PROVIDER NOTE - ATTENDING CONTRIBUTION TO CARE
31yoM; with PMH signif for HIV(compliant with ART); now p/w lower back pain with L LE weakness/pain x2-3 days, associated with fever. also c/o cough and sob.  denies chest pain. denies headache. denies n/v. denies abd pain. denies neck pain.  states he has had similar pain with fevers in past.  denies sick contacts. denies travel.   General:     NAD  Head:     NC/AT, EOMI, oral mucosa moist  Neck:     trachea midline  Lungs:     CTA b/l, no w/r/r  CVS:     S1S2, RRR, no m/g/r  Abd:     +BS, s/nt/nd, no organomegaly  Ext:    2+ radial and pedal pulses, no c/c/e, +SLR  Neuro: AAOx3, no sensory/motor deficits, NIHSS=0  A/P:  31yoM p/w back pain, fever, L LE pain/weakness  -immunocompromised state with fever, cxwr with pna: will start abx and admit  -back pain with fever: stat mri called

## 2022-02-28 NOTE — ED PROVIDER NOTE - PHYSICAL EXAMINATION
General: NAD, normal appearing  HEENT: Normocephalic, atraumatic  Neck: No apparent stiffness or JVD  Pulm: Chest wall symmetric and nontender, lungs clear to ascultation, tachyneic  Cardiac: increased rate and regular rhythm  Abdomen: Nontender and nondistended  Skin: Skin is warm, dry and intact without rashes or lesions.  Neuro: No motor or sensory deficits   MSK: No deformity or tenderness

## 2022-02-28 NOTE — ED PROCEDURE NOTE - CPROC ED TIME OUT STATEMENT1
“Patient's name, , procedure and correct site were confirmed during the [employer] Timeout.” street drug/inhalant/medication abuse/caffeine

## 2022-02-28 NOTE — ED ADULT TRIAGE NOTE - CHIEF COMPLAINT QUOTE
initial call for chest pain, in triage patient states that he is having an HIV flare up and is weak on his left side. weakness started around 11am-12. code stroke called from triage

## 2022-02-28 NOTE — ED PROVIDER NOTE - OBJECTIVE STATEMENT
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  Patient states LLE pain is similar to HIV myalgias he has had in the past, denies incontinence. Patient has had recurrent pneumonias in the past.  Otherwise denies bleeding, abdominal pain.  Denies other pertinent medical problems.  Denies any substance use.

## 2022-02-28 NOTE — PHARMACOTHERAPY INTERVENTION NOTE - COMMENTS
Allergy review:  As per patient, allergy occurred during childhood. Does not recall nature of allergy or if he was treated/hospitalized for allergy. Reports tolerating amoxicillin and ampicillin in the past.

## 2022-02-28 NOTE — ED PROVIDER NOTE - NS ED ROS FT
General: + fever, no massive bleeding  Head: No HA, no ongoing scalp bleed  ENT: no neck pain, no sore throat  Resp: No SOB, no hemoptysis  Cardiovascular: + CP, No LOC  GI: No abdominal pain, No blood in stool  : No dysuria, no hematuria   MSK: + back pain, + limb pain  Skin: No painful or bleeding lesions  Neuro: No paresthesias, No focal deficits

## 2022-03-01 LAB
4/8 RATIO: 0.46 RATIO — LOW (ref 0.9–3.6)
A1C WITH ESTIMATED AVERAGE GLUCOSE RESULT: 4.6 % — SIGNIFICANT CHANGE UP (ref 4–5.6)
ABS CD8: 894 /UL — HIGH (ref 142–740)
ALBUMIN SERPL ELPH-MCNC: 3.3 G/DL — SIGNIFICANT CHANGE UP (ref 3.3–5.2)
ALP SERPL-CCNC: 75 U/L — SIGNIFICANT CHANGE UP (ref 40–120)
ALT FLD-CCNC: 20 U/L — SIGNIFICANT CHANGE UP
ANION GAP SERPL CALC-SCNC: 11 MMOL/L — SIGNIFICANT CHANGE UP (ref 5–17)
ANION GAP SERPL CALC-SCNC: 15 MMOL/L — SIGNIFICANT CHANGE UP (ref 5–17)
APPEARANCE UR: CLEAR — SIGNIFICANT CHANGE UP
AST SERPL-CCNC: 31 U/L — SIGNIFICANT CHANGE UP
B PERT DNA SPEC QL NAA+PROBE: SIGNIFICANT CHANGE UP
BASOPHILS # BLD AUTO: 0.02 K/UL — SIGNIFICANT CHANGE UP (ref 0–0.2)
BASOPHILS NFR BLD AUTO: 0.6 % — SIGNIFICANT CHANGE UP (ref 0–2)
BILIRUB SERPL-MCNC: 0.7 MG/DL — SIGNIFICANT CHANGE UP (ref 0.4–2)
BILIRUB UR-MCNC: NEGATIVE — SIGNIFICANT CHANGE UP
BUN SERPL-MCNC: 6.5 MG/DL — LOW (ref 8–20)
BUN SERPL-MCNC: 8.3 MG/DL — SIGNIFICANT CHANGE UP (ref 8–20)
C PNEUM DNA SPEC QL NAA+PROBE: SIGNIFICANT CHANGE UP
CALCIUM SERPL-MCNC: 7.5 MG/DL — LOW (ref 8.6–10.2)
CALCIUM SERPL-MCNC: 8.1 MG/DL — LOW (ref 8.6–10.2)
CD16+CD56+ CELLS NFR BLD: 7 % — SIGNIFICANT CHANGE UP (ref 5–23)
CD16+CD56+ CELLS NFR SPEC: 160 /UL — SIGNIFICANT CHANGE UP (ref 71–410)
CD19 BLASTS SPEC-ACNC: 24 % — SIGNIFICANT CHANGE UP (ref 6–24)
CD19 BLASTS SPEC-ACNC: 508 /UL — HIGH (ref 84–469)
CD3 BLASTS SPEC-ACNC: 1409 /UL — SIGNIFICANT CHANGE UP (ref 672–1870)
CD3 BLASTS SPEC-ACNC: 69 % — SIGNIFICANT CHANGE UP (ref 59–83)
CD4 %: 21 % — LOW (ref 30–62)
CD8 %: 46 % — HIGH (ref 12–36)
CHLORIDE SERPL-SCNC: 103 MMOL/L — SIGNIFICANT CHANGE UP (ref 98–107)
CHLORIDE SERPL-SCNC: 104 MMOL/L — SIGNIFICANT CHANGE UP (ref 98–107)
CHOLEST SERPL-MCNC: 124 MG/DL — SIGNIFICANT CHANGE UP
CO2 SERPL-SCNC: 21 MMOL/L — LOW (ref 22–29)
CO2 SERPL-SCNC: 21 MMOL/L — LOW (ref 22–29)
COLOR SPEC: YELLOW — SIGNIFICANT CHANGE UP
CREAT SERPL-MCNC: 0.83 MG/DL — SIGNIFICANT CHANGE UP (ref 0.5–1.3)
CREAT SERPL-MCNC: 0.88 MG/DL — SIGNIFICANT CHANGE UP (ref 0.5–1.3)
DIFF PNL FLD: NEGATIVE — SIGNIFICANT CHANGE UP
EGFR: 117 ML/MIN/1.73M2 — SIGNIFICANT CHANGE UP
EGFR: 119 ML/MIN/1.73M2 — SIGNIFICANT CHANGE UP
EOSINOPHIL # BLD AUTO: 0.06 K/UL — SIGNIFICANT CHANGE UP (ref 0–0.5)
EOSINOPHIL NFR BLD AUTO: 1.7 % — SIGNIFICANT CHANGE UP (ref 0–6)
ESTIMATED AVERAGE GLUCOSE: 85 MG/DL — SIGNIFICANT CHANGE UP (ref 68–114)
FLUAV H1 2009 PAND RNA SPEC QL NAA+PROBE: DETECTED
FLUAV H1 RNA SPEC QL NAA+PROBE: SIGNIFICANT CHANGE UP
FLUAV H3 RNA SPEC QL NAA+PROBE: SIGNIFICANT CHANGE UP
FLUAV SUBTYP SPEC NAA+PROBE: DETECTED
FLUBV RNA SPEC QL NAA+PROBE: SIGNIFICANT CHANGE UP
GLUCOSE SERPL-MCNC: 100 MG/DL — HIGH (ref 70–99)
GLUCOSE SERPL-MCNC: 122 MG/DL — HIGH (ref 70–99)
GLUCOSE UR QL: NEGATIVE MG/DL — SIGNIFICANT CHANGE UP
HADV DNA SPEC QL NAA+PROBE: SIGNIFICANT CHANGE UP
HCOV PNL SPEC NAA+PROBE: SIGNIFICANT CHANGE UP
HCT VFR BLD CALC: 38 % — LOW (ref 39–50)
HDLC SERPL-MCNC: 23 MG/DL — LOW
HGB BLD-MCNC: 12.5 G/DL — LOW (ref 13–17)
HIV-1 VIRAL LOAD RESULT: ABNORMAL
HIV1 RNA # SERPL NAA+PROBE: SIGNIFICANT CHANGE UP
HIV1 RNA SER-IMP: SIGNIFICANT CHANGE UP
HIV1 RNA SERPL NAA+PROBE-ACNC: ABNORMAL
HIV1 RNA SERPL NAA+PROBE-LOG#: 3.73 — SIGNIFICANT CHANGE UP
HMPV RNA SPEC QL NAA+PROBE: SIGNIFICANT CHANGE UP
HPIV1 RNA SPEC QL NAA+PROBE: SIGNIFICANT CHANGE UP
HPIV2 RNA SPEC QL NAA+PROBE: SIGNIFICANT CHANGE UP
HPIV3 RNA SPEC QL NAA+PROBE: SIGNIFICANT CHANGE UP
HPIV4 RNA SPEC QL NAA+PROBE: SIGNIFICANT CHANGE UP
IMM GRANULOCYTES NFR BLD AUTO: 0 % — SIGNIFICANT CHANGE UP (ref 0–1.5)
INR BLD: 1.22 RATIO — HIGH (ref 0.88–1.16)
KETONES UR-MCNC: NEGATIVE — SIGNIFICANT CHANGE UP
LDH SERPL L TO P-CCNC: 235 U/L — HIGH (ref 98–192)
LEUKOCYTE ESTERASE UR-ACNC: NEGATIVE — SIGNIFICANT CHANGE UP
LIPID PNL WITH DIRECT LDL SERPL: 77 MG/DL — SIGNIFICANT CHANGE UP
LYMPHOCYTES # BLD AUTO: 1.92 K/UL — SIGNIFICANT CHANGE UP (ref 1–3.3)
LYMPHOCYTES # BLD AUTO: 53.2 % — HIGH (ref 13–44)
MCHC RBC-ENTMCNC: 29.1 PG — SIGNIFICANT CHANGE UP (ref 27–34)
MCHC RBC-ENTMCNC: 32.9 GM/DL — SIGNIFICANT CHANGE UP (ref 32–36)
MCV RBC AUTO: 88.6 FL — SIGNIFICANT CHANGE UP (ref 80–100)
MONOCYTES # BLD AUTO: 0.37 K/UL — SIGNIFICANT CHANGE UP (ref 0–0.9)
MONOCYTES NFR BLD AUTO: 10.2 % — SIGNIFICANT CHANGE UP (ref 2–14)
NEUTROPHILS # BLD AUTO: 1.24 K/UL — LOW (ref 1.8–7.4)
NEUTROPHILS NFR BLD AUTO: 34.3 % — LOW (ref 43–77)
NITRITE UR-MCNC: NEGATIVE — SIGNIFICANT CHANGE UP
NON HDL CHOLESTEROL: 101 MG/DL — SIGNIFICANT CHANGE UP
PH UR: 6.5 — SIGNIFICANT CHANGE UP (ref 5–8)
PLATELET # BLD AUTO: 234 K/UL — SIGNIFICANT CHANGE UP (ref 150–400)
POTASSIUM SERPL-MCNC: 2.8 MMOL/L — CRITICAL LOW (ref 3.5–5.3)
POTASSIUM SERPL-MCNC: 3.8 MMOL/L — SIGNIFICANT CHANGE UP (ref 3.5–5.3)
POTASSIUM SERPL-SCNC: 2.8 MMOL/L — CRITICAL LOW (ref 3.5–5.3)
POTASSIUM SERPL-SCNC: 3.8 MMOL/L — SIGNIFICANT CHANGE UP (ref 3.5–5.3)
PROT SERPL-MCNC: 6.2 G/DL — LOW (ref 6.6–8.7)
PROT UR-MCNC: NEGATIVE — SIGNIFICANT CHANGE UP
PROTHROM AB SERPL-ACNC: 14.2 SEC — HIGH (ref 10.5–13.4)
RAPID RVP RESULT: DETECTED
RBC # BLD: 4.29 M/UL — SIGNIFICANT CHANGE UP (ref 4.2–5.8)
RBC # FLD: 12.5 % — SIGNIFICANT CHANGE UP (ref 10.3–14.5)
RV+EV RNA SPEC QL NAA+PROBE: SIGNIFICANT CHANGE UP
SARS-COV-2 RNA SPEC QL NAA+PROBE: SIGNIFICANT CHANGE UP
SODIUM SERPL-SCNC: 135 MMOL/L — SIGNIFICANT CHANGE UP (ref 135–145)
SODIUM SERPL-SCNC: 140 MMOL/L — SIGNIFICANT CHANGE UP (ref 135–145)
SP GR SPEC: 1.01 — SIGNIFICANT CHANGE UP (ref 1.01–1.02)
T-CELL CD4 SUBSET PNL BLD: 415 /UL — LOW (ref 489–1457)
TRIGL SERPL-MCNC: 119 MG/DL — SIGNIFICANT CHANGE UP
UROBILINOGEN FLD QL: NEGATIVE MG/DL — SIGNIFICANT CHANGE UP
WBC # BLD: 3.61 K/UL — LOW (ref 3.8–10.5)
WBC # FLD AUTO: 3.61 K/UL — LOW (ref 3.8–10.5)

## 2022-03-01 PROCEDURE — 99222 1ST HOSP IP/OBS MODERATE 55: CPT

## 2022-03-01 PROCEDURE — 99233 SBSQ HOSP IP/OBS HIGH 50: CPT

## 2022-03-01 PROCEDURE — 99223 1ST HOSP IP/OBS HIGH 75: CPT

## 2022-03-01 PROCEDURE — 12345: CPT | Mod: NC

## 2022-03-01 RX ORDER — MAGNESIUM OXIDE 400 MG ORAL TABLET 241.3 MG
400 TABLET ORAL DAILY
Refills: 0 | Status: DISCONTINUED | OUTPATIENT
Start: 2022-03-01 | End: 2022-03-04

## 2022-03-01 RX ORDER — POTASSIUM CHLORIDE 20 MEQ
10 PACKET (EA) ORAL ONCE
Refills: 0 | Status: DISCONTINUED | OUTPATIENT
Start: 2022-03-01 | End: 2022-03-01

## 2022-03-01 RX ORDER — QUETIAPINE FUMARATE 200 MG/1
100 TABLET, FILM COATED ORAL
Refills: 0 | Status: DISCONTINUED | OUTPATIENT
Start: 2022-03-01 | End: 2022-03-04

## 2022-03-01 RX ORDER — POTASSIUM CHLORIDE 20 MEQ
20 PACKET (EA) ORAL
Refills: 0 | Status: COMPLETED | OUTPATIENT
Start: 2022-03-01 | End: 2022-03-01

## 2022-03-01 RX ORDER — BICTEGRAVIR SODIUM, EMTRICITABINE, AND TENOFOVIR ALAFENAMIDE FUMARATE 30; 120; 15 MG/1; MG/1; MG/1
1 TABLET ORAL DAILY
Refills: 0 | Status: DISCONTINUED | OUTPATIENT
Start: 2022-03-01 | End: 2022-03-04

## 2022-03-01 RX ORDER — LANOLIN ALCOHOL/MO/W.PET/CERES
5 CREAM (GRAM) TOPICAL AT BEDTIME
Refills: 0 | Status: DISCONTINUED | OUTPATIENT
Start: 2022-03-01 | End: 2022-03-04

## 2022-03-01 RX ORDER — ACETAMINOPHEN 500 MG
650 TABLET ORAL EVERY 6 HOURS
Refills: 0 | Status: DISCONTINUED | OUTPATIENT
Start: 2022-03-01 | End: 2022-03-04

## 2022-03-01 RX ORDER — POTASSIUM CHLORIDE 20 MEQ
10 PACKET (EA) ORAL ONCE
Refills: 0 | Status: COMPLETED | OUTPATIENT
Start: 2022-03-01 | End: 2022-03-01

## 2022-03-01 RX ORDER — ENOXAPARIN SODIUM 100 MG/ML
40 INJECTION SUBCUTANEOUS AT BEDTIME
Refills: 0 | Status: DISCONTINUED | OUTPATIENT
Start: 2022-03-01 | End: 2022-03-04

## 2022-03-01 RX ORDER — POTASSIUM CHLORIDE 20 MEQ
10 PACKET (EA) ORAL
Refills: 0 | Status: COMPLETED | OUTPATIENT
Start: 2022-03-01 | End: 2022-03-01

## 2022-03-01 RX ORDER — MEROPENEM 1 G/30ML
1000 INJECTION INTRAVENOUS EVERY 8 HOURS
Refills: 0 | Status: DISCONTINUED | OUTPATIENT
Start: 2022-03-01 | End: 2022-03-01

## 2022-03-01 RX ORDER — POTASSIUM CHLORIDE 20 MEQ
10 PACKET (EA) ORAL
Refills: 0 | Status: DISCONTINUED | OUTPATIENT
Start: 2022-03-01 | End: 2022-03-01

## 2022-03-01 RX ORDER — POTASSIUM CHLORIDE 20 MEQ
40 PACKET (EA) ORAL
Refills: 0 | Status: COMPLETED | OUTPATIENT
Start: 2022-03-01 | End: 2022-03-01

## 2022-03-01 RX ORDER — MAGNESIUM SULFATE 500 MG/ML
1 VIAL (ML) INJECTION ONCE
Refills: 0 | Status: COMPLETED | OUTPATIENT
Start: 2022-03-01 | End: 2022-03-01

## 2022-03-01 RX ORDER — MAGNESIUM SULFATE 500 MG/ML
2 VIAL (ML) INJECTION ONCE
Refills: 0 | Status: COMPLETED | OUTPATIENT
Start: 2022-03-01 | End: 2022-03-01

## 2022-03-01 RX ORDER — QUETIAPINE FUMARATE 200 MG/1
200 TABLET, FILM COATED ORAL
Refills: 0 | Status: DISCONTINUED | OUTPATIENT
Start: 2022-03-01 | End: 2022-03-04

## 2022-03-01 RX ORDER — GABAPENTIN 400 MG/1
300 CAPSULE ORAL THREE TIMES A DAY
Refills: 0 | Status: DISCONTINUED | OUTPATIENT
Start: 2022-03-01 | End: 2022-03-04

## 2022-03-01 RX ORDER — BACLOFEN 100 %
10 POWDER (GRAM) MISCELLANEOUS THREE TIMES A DAY
Refills: 0 | Status: DISCONTINUED | OUTPATIENT
Start: 2022-03-01 | End: 2022-03-04

## 2022-03-01 RX ORDER — VANCOMYCIN HCL 1 G
1500 VIAL (EA) INTRAVENOUS EVERY 12 HOURS
Refills: 0 | Status: DISCONTINUED | OUTPATIENT
Start: 2022-03-01 | End: 2022-03-01

## 2022-03-01 RX ADMIN — Medication 650 MILLIGRAM(S): at 11:40

## 2022-03-01 RX ADMIN — Medication 75 MILLIGRAM(S): at 04:20

## 2022-03-01 RX ADMIN — GABAPENTIN 300 MILLIGRAM(S): 400 CAPSULE ORAL at 21:25

## 2022-03-01 RX ADMIN — Medication 10 MILLIGRAM(S): at 21:25

## 2022-03-01 RX ADMIN — MAGNESIUM OXIDE 400 MG ORAL TABLET 400 MILLIGRAM(S): 241.3 TABLET ORAL at 11:34

## 2022-03-01 RX ADMIN — QUETIAPINE FUMARATE 200 MILLIGRAM(S): 200 TABLET, FILM COATED ORAL at 17:41

## 2022-03-01 RX ADMIN — Medication 5 MILLIGRAM(S): at 21:24

## 2022-03-01 RX ADMIN — Medication 40 MILLIEQUIVALENT(S): at 09:14

## 2022-03-01 RX ADMIN — Medication 100 MILLIEQUIVALENT(S): at 09:11

## 2022-03-01 RX ADMIN — Medication 100 MILLIEQUIVALENT(S): at 05:56

## 2022-03-01 RX ADMIN — Medication 20 MILLIEQUIVALENT(S): at 04:19

## 2022-03-01 RX ADMIN — Medication 650 MILLIGRAM(S): at 16:47

## 2022-03-01 RX ADMIN — Medication 100 GRAM(S): at 02:35

## 2022-03-01 RX ADMIN — Medication 75 MILLIGRAM(S): at 17:41

## 2022-03-01 RX ADMIN — Medication 20 MILLIEQUIVALENT(S): at 02:33

## 2022-03-01 RX ADMIN — Medication 40 MILLIEQUIVALENT(S): at 11:34

## 2022-03-01 RX ADMIN — BICTEGRAVIR SODIUM, EMTRICITABINE, AND TENOFOVIR ALAFENAMIDE FUMARATE 1 TABLET(S): 30; 120; 15 TABLET ORAL at 11:34

## 2022-03-01 RX ADMIN — Medication 25 GRAM(S): at 05:40

## 2022-03-01 RX ADMIN — QUETIAPINE FUMARATE 100 MILLIGRAM(S): 200 TABLET, FILM COATED ORAL at 05:43

## 2022-03-01 RX ADMIN — GABAPENTIN 300 MILLIGRAM(S): 400 CAPSULE ORAL at 05:44

## 2022-03-01 RX ADMIN — Medication 40 MILLIEQUIVALENT(S): at 05:43

## 2022-03-01 RX ADMIN — Medication 10 MILLIGRAM(S): at 05:43

## 2022-03-01 NOTE — CONSULT NOTE ADULT - SUBJECTIVE AND OBJECTIVE BOX
[facility]                                                INFECTIOUS DISEASES  =======================================================                               Pro Cordoba MD#  Heber Ramsey MD*                                     Nabeel Jean-Baptiste MD*    Nilda Mendoza MD*            Diplomates American Board of Internal Medicine & Infectious Diseases                  # [place] Office - Appt - Tel  [phone] Fax [phone]                * [place] Office - Appt - Tel [phone] Fax [phone]                                  Hospital Consult line:  [phone]  =======================================================      N-[id number]  [facility]   HPI:  This 30 yo male with 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 He was recommended rehab placement but he refused to go to rehab and was discharged home.  He presented to the ED for 2 days of pleuritic chest pain, fever and dry cough. he also complains of lower extremity weakness more on the left. he reports that he gets these weakness during his HIV flare ups. patient is able to ambulate.  denies exertional chest pain, palpitation, LOC, seizure, leg swelling, urinary retention or diarrhea (01 Mar 2022 00:48)    patient reports inconsistent compliance with his ART.  Recent VL 60054 and CD4 of 262 done at [facility] on 1/28/22.  Repeat CD4 this visit is at 415. Repeat VL is in process.     patient swabbed positive for influenza A H3 on RVP.  CT of the lung shows TIB infiltrates.       I have personally reviewed the labs and data; pertinent labs and data are listed in this note; please see below.   =======================================================  Past Medical & Surgical Hx:  =====================  PAST MEDICAL & SURGICAL HISTORY:  HIV (human immunodeficiency virus infection) from birth  Asthma  Closed fracture of multiple ribs of right side, initial encounter  Cocaine abuse  Chronic sinusitis  Homeless  History of orthopedic surgery left arm    Problem List:  ==========  HEALTH ISSUES - PROBLEM Dx:    Social Hx:  =======  no toxic habits currently    FAMILY HISTORY:  FH: HIV infection (Mother)    no significant family history of immunosuppressive disorders in mother or father   =======================================================    REVIEW OF SYSTEMS:  CONSTITUTIONAL:   + FEVERS  + CHILLS  HEENT:  No diplopia or blurred vision.  No earache, sore throat or runny nose.  CARDIOVASCULAR:  No pressure, squeezing, strangling, tightness, heaviness or aching about the chest, neck, axilla or epigastrium.  RESPIRATORY:  No cough, shortness of breath  GASTROINTESTINAL:  No nausea, vomiting or diarrhea.  GENITOURINARY:  No dysuria, frequency or urgency. No Blood in urine  MUSCULOSKELETAL:  no joint aches, no muscle pain  SKIN:  No change in skin, hair or nails.  NEUROLOGIC:  No Headaches, seizures or weakness.  PSYCHIATRIC:  No disorder of thought or mood.  ENDOCRINE:  No heat or cold intolerance  HEMATOLOGICAL:  No easy bruising or bleeding.    =======================================================  Allergies  Ceclor (Unknown)  Toradol (Anaphylaxis; Swelling)          Antibiotics:  bictegravir 50 mG/emtricitabine 200 mG/tenofovir alafenamide 25 mG (BIKTARVY) 1 Tablet(s) Oral daily  oseltamivir 75 milliGRAM(s) Oral two times a day    Other medications:  baclofen 10 milliGRAM(s) Oral three times a day  enoxaparin Injectable 40 milliGRAM(s) SubCutaneous at bedtime  gabapentin 300 milliGRAM(s) Oral three times a day  magnesium oxide 400 milliGRAM(s) Oral daily  melatonin 5 milliGRAM(s) Oral at bedtime  potassium chloride   Powder 40 milliEquivalent(s) Oral every 2 hours  QUEtiapine 100 milliGRAM(s) Oral <User Schedule>  QUEtiapine 200 milliGRAM(s) Oral <User Schedule>         bictegravir 50 mG/emtricitabine 200 mG/tenofovir alafenamide 25 mG (BIKTARVY)   1 Tablet(s) Oral (01-27-22 @ 13:44)   1 Tablet(s) Oral (01-28-22 @ 11:45)   1 Tablet(s) Oral (01-29-22 @ 11:40)   1 Tablet(s) Oral (01-30-22 @ 11:29)   1 Tablet(s) Oral (01-31-22 @ 14:17)   1 Tablet(s) Oral (02-01-22 @ 12:36)   1 Tablet(s) Oral (02-02-22 @ 11:34)   1 Tablet(s) Oral (02-03-22 @ 11:15)    meropenem  IVPB   200 mL/Hr IV Intermittent (02-28-22 @ 22:47)    vancomycin  IVPB.   250 mL/Hr IV Intermittent (02-28-22 @ 23:56)    oseltamivir   75 milliGRAM(s) Oral (03-01-22 @ 04:20)      ======================================================  Physical Exam:  ============  T(F): 98.6 (01 Mar 2022 04:05), Max: 102 (28 Feb 2022 18:06)  HR: 81 (01 Mar 2022 04:05)  BP: 104/64 (01 Mar 2022 04:05)  RR: 18 (01 Mar 2022 04:05)  SpO2: 98% (01 Mar 2022 04:05) (97% - 98%)  temp max in last 48H T(F): , Max: 102 (02-28-22 @ 18:06)Height (cm): 180.3 (02-28-22 @ 18:35)  Weight (kg): 97.7 (02-28-22 @ 18:35)  BMI (kg/m2): 30.1 (02-28-22 @ 18:35)  BSA (m2): 2.18 (02-28-22 @ 18:35)    General:  No acute distress.  Eye: Pupils are equal, round and reactive to light, Extraocular movements are intact, Normal conjunctiva.  HENT: Normocephalic, Oral mucosa is moist, No pharyngeal erythema, No sinus tenderness.  Neck: Supple, No lymphadenopathy.  Respiratory: Lungs with Coarse breath sounds.   Cardiovascular: Normal rate, Regular rhythm,   Gastrointestinal: Soft, Non-tender, Non-distended, Normal bowel sounds.  Genitourinary: No costovertebral angle tenderness.  Lymphatics: No lymphadenopathy neck,   Musculoskeletal: Normal range of motion, Normal strength.  Integumentary: No rash.  Neurologic: Alert, Oriented, No focal deficits, Cranial Nerves II-XII are grossly intact.      =======================================================  Labs:                        12.5   3.61  )-----------( 234      ( 01 Mar 2022 03:45 )             38.0     03-01    140  |  104  |  8.3  ----------------------------<  122<H>  2.8<LL>   |  21.0<L>  |  0.88    Ca    7.5<L>      01 Mar 2022 03:45    TPro  6.2<L>  /  Alb  3.3  /  TBili  0.7  /  DBili  x   /  AST  31  /  ALT  20  /  AlkPhos  75  03-01      Creatinine, Serum: 0.88 mg/dL (03-01-22 @ 03:45)  Creatinine, Serum: 1.05 mg/dL (02-28-22 @ 18:29)      Respiratory Viral Panel with COVID-19 by MARY (02.28.22 @ 23:36)    Rapid RVP Result: Detected: TYPE:(C=Critical, N=Notification, A=Abnormal) _C  TESTS: _Influenza A and Influenza AH3  DATE/TIME CALLED: _03/01/2022 02:25:35 EST  CALLED TO: Dany Rodriguez RN  READ BACK (2 Patient Identifiers)(Y/N): _Y  READ BACK VALUES (Y/N): _Y  CALLED BY: _CHARBEL    SARS-CoV-2: NotDetec: This Respiratory Panel uses polymerase chain reaction (PCR) to detect for  adenovirus; coronavirus (HKU1, NL63, 229E, OC43); human metapneumovirus  (hMPV); human enterovirus/rhinovirus (Entero/RV); influenza A; influenza  A/H1; influenza A/H3; influenza A/H1-2009; influenza B; parainfluenza  viruses 1, 2, 3, 4; respiratory syncytial virus; Mycoplasma pneumoniae;  Chlamydophila pneumoniae; and SARS-CoV-2.    Adenovirus (RapRVP): NotDetec    Influenza A (RapRVP): Detected: TYPE:(C=Critical, N=Notification, A=Abnormal) _C  TESTS: _Influenza A and Influenza AH3  DATE/TIME CALLED: _03/01/2022 02:25:35 EST  CALLED TO: Dany Rodriguez RN  READ BACK (2 Patient Identifiers)(Y/N): _Y  READ BACK VALUES (Y/N): _Y  CALLED BY: _CHARBEL    Influenza AH1 2009 (RapRVP): NotDetec    Influenza AH1 (RapRVP): NotDetec    Influenza AH3 (RapRVP): Detected: TYPE:(C=Critical, N=Notification, A=Abnormal) _C  TESTS: _Influenza A and Influenza AH3  DATE/TIME CALLED: _03/01/2022 02:25:35 EST  CALLED TO: Dany Rodriguez RN  READ BACK (2 Patient Identifiers)(Y/N): _Y  READ BACK VALUES (Y/N): _Y  CALLED BY: _CHARBEL    Influenza B (RapRVP): NotDetec    Parainfluenza 1 (RapRVP): NotDetec    Parainfluenza 2 (RapRVP): NotDetec    Parainfluenza 3 (RapRVP): NotDetec    Parainfluenza 4 (RapRVP): NotDetec    Chlamydia pneumoniae (RapRVP): NotDetec    Mycoplasma pneumoniae (RapRVP): NotDetec    Entero/Rhinovirus (RapRVP): NotDetec    hMPV (RapRVP): NotDetec    Coronavirus (229E,HKU1,NL63,OC43): NotDetec       < from: CT Chest w/ IV Cont (02.28.22 @ 20:19) >    ACC: [id number] EXAM:  CT ABDOMEN AND PELVIS IC                        ACC: [id number] EXAM:  CT CHEST IC                          PROCEDURE DATE:  02/28/2022          INTERPRETATION:  CLINICAL INFORMATION: Sepsis    COMPARISON: CT chest 10/25/2020 andCT abdomen pelvis 12/12/2020    CONTRAST/COMPLICATIONS:  IV Contrast: Omnipaque 300 (accession [id number]), IV contrast documented   in associated exam (accession [id number])  93 cc administered   7 cc   discarded  Oral Contrast: NONE  Complications: None reported at time of study completion    PROCEDURE:  CT of the Chest, Abdomen and Pelvis was performed.  Sagittal and coronal reformats were performed.    FINDINGS: Motion artifacts degrade some of the imaging.  CHEST:  LUNGS AND LARGE AIRWAYS: Airway wall thickening with minor areas of   scattered mucous plugging, greatest within the right middle lobe.  Mild   upper lobe paraseptal emphysema. Patchy tree-in-bud groundglass   infiltrates within the right middle and right lower lobes and also to   lesser degree within lingula and left lower lobe  PLEURA: Trace pleural effusions  VESSELS: Within normal limits.  HEART: Heart size is normal. No pericardial effusion.  MEDIASTINUM AND LUIZ: No lymphadenopathy. Small volume of residual versus   hyperplastic thymic tissue.  CHEST WALL AND LOWER NECK: Mild bilateral gynecomastia    ABDOMEN AND PELVIS:  LIVER: Mild focal fat deposition adjacent to the falciform ligament. Mild   diffuse hepatic steatosis  BILE DUCTS: Normal caliber.  GALLBLADDER: Decompressed  SPLEEN: Within normal limits.  PANCREAS: Within normal limits.  ADRENALS: Within normal limits.  KIDNEYS/URETERS: Symmetric renal enhancement without hydronephrosis. Few   subcentimeter bilateral hypodensities too small for definitive   characterization    BLADDER: Within normal limits.  REPRODUCTIVE ORGANS: Prostate within normal limits.    BOWEL: No bowel obstruction. Appendix is normal.  PERITONEUM: Trace dependent pelvic fluid.  VESSELS: Within normal limits.  RETROPERITONEUM/LYMPHNODES: No lymphadenopathy.  ABDOMINAL WALL: Small fat-containing umbilical hernia. Mild dependent   subcutaneous edema.  BONES: Within normal limits.    IMPRESSION:    Patchy tree-in-bud groundglass infiltrates likely represent bronchopneumonia.    Mild diffuse hepatic steatosis    Trace dependent pelvic fluid        --- End of Report ---           JEREMY BANI MD; Attending Radiologist  This document has been electronically signed. Feb 28 2022  8:41PM    < end of copied text >    
                            [facility]                                        [facility]                                 Jolanta Marte, & Rene                                  [address]                                        [place], [place], [address]                                             [phone]        CC: Legs weak/HIV myelopathy.    HPI:   The patient is a 31 year old man whom we had initially seen in March of 2020 when he presented with bilateral increased weakness of the lower extremities.   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   The exacerbation presented acutely and stroke work up was done which was negative.  He had reportedly been at [facility] in 2018 where the diagnosis of GBS was made despite negative CSF.   He was admitted at [facility] in January of 2022 with chest pain and was noted to have bilateral leg weakness again and the neurology service was consulted.   It was clear that the diagnosis of Guillain Mukilteo syndrome was incorrect based upon the negative work up and his exam showing hyperreflexia of the lower extremities.   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    He now returns to the ER with cough and pleuritic chest pain which began 3 days prior to arrival.  His lower extremity weakness has worsened.   He reports that this typically worsens when he has respiratory compromise.     ROS:   Denies headache or dizziness.  Denies chest pain.  Denies shortness of breath.    MEDICATIONS  (STANDING):  baclofen 10 milliGRAM(s) Oral three times a day  bictegravir 50 mG/emtricitabine 200 mG/tenofovir alafenamide 25 mG (BIKTARVY) 1 Tablet(s) Oral daily  enoxaparin Injectable 40 milliGRAM(s) SubCutaneous at bedtime  gabapentin 300 milliGRAM(s) Oral three times a day  magnesium oxide 400 milliGRAM(s) Oral daily  melatonin 5 milliGRAM(s) Oral at bedtime  oseltamivir 75 milliGRAM(s) Oral two times a day  potassium chloride   Powder 40 milliEquivalent(s) Oral every 2 hours  QUEtiapine 100 milliGRAM(s) Oral <User Schedule>  QUEtiapine 200 milliGRAM(s) Oral <User Schedule>    Vital Signs Last 24 Hrs  T(C): 37 (01 Mar 2022 04:05), Max: 38.9 (28 Feb 2022 18:06)  T(F): 98.6 (01 Mar 2022 04:05), Max: 102 (28 Feb 2022 18:06)  HR: 81 (01 Mar 2022 04:05) (81 - 110)  BP: 104/64 (01 Mar 2022 04:05) (104/64 - 137/78)  RR: 18 (01 Mar 2022 04:05) (16 - 18)  SpO2: 98% (01 Mar 2022 04:05) (97% - 98%)    Detailed Neurologic Exam:    Mental status: The patient is awake but slow to answer questions and follow instructions although ultimately does so correctly.    Cranial nerves: Pupils equal and react symmetrically to light. There is no visual field deficit to threat. Extraocular motion is full with no nystagmus. Facial sensation is intact. Facial musculature is symmetric. Palate elevates symmetrically. Tongue is midline.    Motor: There is normal bulk and tone.  There is no tremor.  Strength grossly 5/5 in the upper extremities bilaterally.  LE strength is 2-3/5 throughout.    Sensation: Grossly intact to light touch and pin.    Reflexes: 2+ in the upper extremities, but 3+ in the lower extremities and plantar responses are extensor bilaterally. There is clonus of both ankles which is nonsustained at 3-4 beats bilaterally.    Cerebellar: No dysmetria on finger nose testing.    Labs:     03-01    140  |  104  |  8.3  ----------------------------<  122<H>  2.8<LL>   |  21.0<L>  |  0.88    Ca    7.5<L>      01 Mar 2022 03:45    TPro  6.2<L>  /  Alb  3.3  /  TBili  0.7  /  DBili  x   /  AST  31  /  ALT  20  /  AlkPhos  75  03-01                            12.5   3.61  )-----------( 234      ( 01 Mar 2022 03:45 )             38.0       Rad:   CT head images reviewed (and concur with report): There is no acute pathology.     CT lumbar spine reviewed. There is no fracture or subluxation.

## 2022-03-01 NOTE — CONSULT NOTE ADULT - ASSESSMENT
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 He was recommended rehab placement but he refused to go to rehab and was discharged home.  He presented to the ED for 2 days of pleuritic chest pain, fever and dry cough. he also complains of lower extremity weakness more on the left. he reports that he gets these weakness during his HIV flare ups. patient is able to ambulate.  denies exertional chest pain, palpitation, LOC, seizure, leg swelling, urinary retention or diarrhea (01 Mar 2022 00:48)    patient reports inconsistent compliance with his ART.  Recent VL 78024 and CD4 of 262 done at [facility] on 1/28/22.  Repeat CD4 this visit is at 415. Repeat VL is in process.     patient swabbed positive for influenza A H3 on RVP.  CT of the lung shows TIB infiltrates.       Impression:  fevers  influenza  viral pneumonia  lung infiltrates  HIV      Plan:  - for the influenza,   agree with Tamiflu course  - patient not vaccinated for the influenza virus for the last 2 years.     - hold off antibacterial agents for now.  watch closely    - follow up all outstanding cultures  - trend temperature and WBC curve  - repeat cultures from blood and all sources if febrile.        HIV  - CD4 improving between visit,   suggestive of better medication compliance   - continue Biktarvy.   - follow up on viral load.     
The patient is a 31y Male with myelopathy likely related to congenital HIV infection now admitted with pneumonia/influenza.     Myelopathy.  Exacerbation of weakness in setting of pneumonia/influenza.  No further work up recommended at present.   Mobilize with physical therapy.     Flu/pneumonia.   Plan per medicine.     HIV   Continue home meds.   Consider ID follow up.     Case discussed with Dr Bragg (ER attending) and with Dr Martinez.

## 2022-03-01 NOTE — H&P ADULT - ASSESSMENT
Problem/Plan - 1:  ·  Problem: Lower extremity weakness.   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 Extensive prior work-up and imaging inconsistent with myasthenia gravis and GBS. Differential diagnosis to consider hypokalemia, however, unlikely to explain this worsening LE weakness for past few years. 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 Patient previously refused d/t claustrophobia even with valium. Patient agreeable to trialing ativan prior to MRI. D/w MRI.  -- f/u ESR 16, A1c wnl, TSH wnl, B12 wnl, MMA low, homocysteine wnl  - ID consulted, appreciate recs  - hep c neg  - fall precaution  - PT/OT rec acute rehab - PMR consulted rec [facility] d/t undomiciled status  - There may be psychogenic component to his weakness. 1/28 in the AM, patient was moving his lower extremity when sleeping/ about to waking up but unable to move the extremity in the same manner during exam.     Problem/Plan - 2:  ·  Problem: HIV (human immunodeficiency virus infection).   ·  Plan: Patient on biktarvy  - Last CD4 262  - HIV viral load: 29,184  - C/w home medication.     Problem/Plan - 3:  ·  Problem: Hypokalemia.   ·  Plan: Patient with hypokalemia to 3.1. unclear etiology, no medications that can cause. Potentially decrease PO intake given he is not domiciled  - replete as necessary, replete mag prior  - Repeat BMP in AM  - Replete K if <3.5.     Problem/Plan - 4:  ·  Problem: GBS (Guillain [place] syndrome).   ·  Plan: Reported hx of GBS at [facility] in 2018. However, history and work-up inconsistent. On home baclofen and gabapentin, reportedly helps  - C/w gabapentin.     Problem/Plan - 5:  ·  Problem: Preventive measure.   ·  Plan: DVT: lovenox  Diet: Regular  Dispo: PT rec acute rehab, however PMR rec [facility] d/t undomiciled status, lack of dispo plan after rehab; patient no longer wishes to go to rehab - states he has a residence in [place] that he wishes to return to.   patient is a poor historian and does not co-operate with history and physical exam  30 yo male with PMH of congenital HIV on BIKTARVY, reported GBS in 2018 with negative work up, recent admission at [facility] for b/l lower extremity weakness (left >right), patient had multiple imaging studies done in the recent past with negative study results. 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 He was recommended rehab placement but he refused to go to rehab and was discharged home.    He presented to the ed for 2 days of pleuritic chest pain, fever and dry cough. he also complains of lower extremity weakness more on the left. he reports that he gets these weakness during his HIV flare ups. patient is able to ambulate.    Impression:  # Influenza A infection  - recent hospital discharge r/o superinfection (ct scan --suggestive of Pneumonia)  - rule out pneumocystis infection --less likely but borderline CD4 count ~280  - send LDH, fungitell  - ID consult  - vanco and meropenem for empiric coverage  - blood culture  - send full RVP  - tylenol for fever    #Lower extremity weakness.   - likely in the setting of acute infection  - Extensive prior work-up and imaging inconsistent with myasthenia gravis and GBS. 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  - fall precaution  - pt consult  - ?functional component    # HIV (human immunodeficiency virus infection).   - Patient on biktarvy  - Last CD4 262  - HIV viral load: 29,184  - C/w home medication.     # h/o ?GBS (Guillain [place] syndrome).   Reported hx of GBS at [facility] in 2018. However, history and work-up inconsistent. On home baclofen and gabapentin, reportedly helps  - C/w gabapentin.    DVT: lovenox  Regular diet     patient is a poor historian and does not co-operate with history and physical exam  30 yo male with PMH of congenital HIV on BIKTARVY, reported GBS in 2018 with negative work up, recent admission at [facility] for b/l lower extremity weakness (left >right), patient had multiple imaging studies done in the recent past with negative study results. 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 He was recommended rehab placement but he refused to go to rehab and was discharged home.    He presented to the ed for 2 days of pleuritic chest pain, fever and dry cough. he also complains of lower extremity weakness more on the left. he reports that he gets these weakness during his HIV flare ups. patient is able to ambulate.    Impression:  # Influenza A infection  - recent hospital discharge r/o superinfection (ct scan --suggestive of Pneumonia)  - rule out pneumocystis infection --less likely but borderline CD4 count ~280 last month  - send LDH, fungitell  - vanco and meropenem for empiric coverage for tonight, f/u - ID consult  - blood culture  - send full RVP  - tylenol for fever    #Lower extremity weakness.   - likely in the setting of acute infection  - Extensive prior work-up and imaging inconsistent with myasthenia gravis and GBS. 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  - fall precaution  - pt consult  - ?functional component    # electrolyte imbalance  replete    # HIV (human immunodeficiency virus infection).   - Patient on biktarvy  - Last CD4 262  - HIV viral load: 29,184  - C/w home medication.     # h/o ?GBS (Guillain [place] syndrome).   Reported hx of GBS at [facility] in 2018. However, history and work-up inconsistent. On home baclofen and gabapentin, reportedly helps  - C/w gabapentin.    DVT: lovenox  Regular diet

## 2022-03-01 NOTE — H&P ADULT - NSICDXPASTMEDICALHX_GEN_ALL_CORE_FT
PAST MEDICAL HISTORY:  Asthma     Chronic sinusitis     Closed fracture of multiple ribs of right side, initial encounter     Cocaine abuse     HIV (human immunodeficiency virus infection) from birth    Homeless

## 2022-03-01 NOTE — PATIENT PROFILE ADULT - FALL HARM RISK - RISK INTERVENTIONS

## 2022-03-01 NOTE — H&P ADULT - NSHPSOCIALHISTORY_GEN_ALL_CORE
active smoker 4-5 sticks every day  denies alcohol or drug use although documented h/o cocaine abuse in PMH

## 2022-03-01 NOTE — H&P ADULT - NSHPPHYSICALEXAM_GEN_ALL_CORE
Vital Signs Last 24 Hrs  T(C): 37.6 (28 Feb 2022 23:27), Max: 38.9 (28 Feb 2022 18:06)  T(F): 99.7 (28 Feb 2022 23:27), Max: 102 (28 Feb 2022 18:06)  HR: 106 (28 Feb 2022 23:27) (106 - 110)  BP: 129/70 (28 Feb 2022 23:27) (129/70 - 137/78)  BP(mean): --  RR: 16 (28 Feb 2022 23:27) (16 - 16)  SpO2: 97% (28 Feb 2022 23:27) (97% - 97%)    PHYSICAL EXAM:  GENERAL: NAD, well-developed  HEAD:  Atraumatic, Normocephalic  EYES: EOMI, PERRLA, conjunctiva and sclera clear  NECK: Supple, No JVD  CHEST/LUNG: Clear to auscultation bilaterally; No wheeze  HEART: Regular rate and rhythm; No murmurs, rubs, or gallops  ABDOMEN: Soft, Nontender, Nondistended; Bowel sounds present  EXTREMITIES:  2+ Peripheral Pulses, No clubbing, cyanosis, or edema  PSYCH: AAOx3  NEUROLOGY: non-focal  SKIN: No rashes or lesions

## 2022-03-01 NOTE — H&P ADULT - HISTORY OF PRESENT ILLNESS
30 yo male with PMH of congenital HIV on BIKTARVY, reported GBS in 2018 with negative work up, recent admission at [facility] for b/l lower extremity weakness (left >right), patient had multiple imaging studies done in the recent past with negative study results. 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 He was recommended rehab placement but he refused to go to rehab.    patient is a poor historian and does not co-operate with history and physical exam  32 yo male with PMH of congenital HIV on BIKTARVY, reported GBS in 2018 with negative work up, recent admission at [facility] for b/l lower extremity weakness (left >right), patient had multiple imaging studies done in the recent past with negative study results. 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 He was recommended rehab placement but he refused to go to rehab and was discharged home.    He presented to the ed for 2 days of pleuritic chest pain, fever and dry cough. he also complains of lower extremity weakness more on the left. he reports that he gets these weakness during his HIV flare ups. patient is able to ambulate.    denies exertional chest pain, palpitation, LOC, seizure, leg swelling, urinary retention or diarrhea
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
## 2022-03-02 DIAGNOSIS — R68.83 CHILLS (WITHOUT FEVER): ICD-10-CM

## 2022-03-02 DIAGNOSIS — R45.851 SUICIDAL IDEATIONS: ICD-10-CM

## 2022-03-02 DIAGNOSIS — G61.0 GUILLAIN-BARRE SYNDROME: ICD-10-CM
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
## 2022-03-02 DIAGNOSIS — F17.200 NICOTINE DEPENDENCE, UNSPECIFIED, UNCOMPLICATED: ICD-10-CM

## 2022-03-02 DIAGNOSIS — S09.8XXA OTHER SPECIFIED INJURIES OF HEAD, INITIAL ENCOUNTER: ICD-10-CM

## 2022-03-02 DIAGNOSIS — J45.909 UNSPECIFIED ASTHMA, UNCOMPLICATED: ICD-10-CM
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
## 2022-03-02 DIAGNOSIS — Z21 ASYMPTOMATIC HUMAN IMMUNODEFICIENCY VIRUS [HIV] INFECTION STATUS: ICD-10-CM

## 2022-03-02 LAB
ALBUMIN SERPL ELPH-MCNC: 3.3 G/DL — SIGNIFICANT CHANGE UP (ref 3.3–5.2)
ALP SERPL-CCNC: 75 U/L — SIGNIFICANT CHANGE UP (ref 40–120)
ALT FLD-CCNC: 18 U/L — SIGNIFICANT CHANGE UP
ANION GAP SERPL CALC-SCNC: 15 MMOL/L — SIGNIFICANT CHANGE UP (ref 5–17)
AST SERPL-CCNC: 22 U/L — SIGNIFICANT CHANGE UP
BILIRUB SERPL-MCNC: 0.5 MG/DL — SIGNIFICANT CHANGE UP (ref 0.4–2)
BUN SERPL-MCNC: 10.8 MG/DL — SIGNIFICANT CHANGE UP (ref 8–20)
CALCIUM SERPL-MCNC: 8.1 MG/DL — LOW (ref 8.6–10.2)
CHLORIDE SERPL-SCNC: 101 MMOL/L — SIGNIFICANT CHANGE UP (ref 98–107)
CO2 SERPL-SCNC: 20 MMOL/L — LOW (ref 22–29)
CREAT SERPL-MCNC: 1.05 MG/DL — SIGNIFICANT CHANGE UP (ref 0.5–1.3)
CULTURE RESULTS: NO GROWTH — SIGNIFICANT CHANGE UP
EGFR: 97 ML/MIN/1.73M2 — SIGNIFICANT CHANGE UP
FUNGITELL: 39 PG/ML — SIGNIFICANT CHANGE UP
GLUCOSE SERPL-MCNC: 108 MG/DL — HIGH (ref 70–99)
HCT VFR BLD CALC: 36.2 % — LOW (ref 39–50)
HGB BLD-MCNC: 11.6 G/DL — LOW (ref 13–17)
MAGNESIUM SERPL-MCNC: 1.6 MG/DL — SIGNIFICANT CHANGE UP (ref 1.6–2.6)
MCHC RBC-ENTMCNC: 28.4 PG — SIGNIFICANT CHANGE UP (ref 27–34)
MCHC RBC-ENTMCNC: 32 GM/DL — SIGNIFICANT CHANGE UP (ref 32–36)
MCV RBC AUTO: 88.5 FL — SIGNIFICANT CHANGE UP (ref 80–100)
PHOSPHATE SERPL-MCNC: 2.9 MG/DL — SIGNIFICANT CHANGE UP (ref 2.4–4.7)
PLATELET # BLD AUTO: 224 K/UL — SIGNIFICANT CHANGE UP (ref 150–400)
POTASSIUM SERPL-MCNC: 3.3 MMOL/L — LOW (ref 3.5–5.3)
POTASSIUM SERPL-SCNC: 3.3 MMOL/L — LOW (ref 3.5–5.3)
PROT SERPL-MCNC: 6.5 G/DL — LOW (ref 6.6–8.7)
RBC # BLD: 4.09 M/UL — LOW (ref 4.2–5.8)
RBC # FLD: 12.5 % — SIGNIFICANT CHANGE UP (ref 10.3–14.5)
SODIUM SERPL-SCNC: 136 MMOL/L — SIGNIFICANT CHANGE UP (ref 135–145)
SPECIMEN SOURCE: SIGNIFICANT CHANGE UP
WBC # BLD: 3.16 K/UL — LOW (ref 3.8–10.5)
WBC # FLD AUTO: 3.16 K/UL — LOW (ref 3.8–10.5)

## 2022-03-02 PROCEDURE — 99232 SBSQ HOSP IP/OBS MODERATE 35: CPT

## 2022-03-02 PROCEDURE — 99233 SBSQ HOSP IP/OBS HIGH 50: CPT

## 2022-03-02 RX ORDER — MAGNESIUM OXIDE 400 MG ORAL TABLET 241.3 MG
400 TABLET ORAL ONCE
Refills: 0 | Status: COMPLETED | OUTPATIENT
Start: 2022-03-02 | End: 2022-03-02

## 2022-03-02 RX ORDER — POTASSIUM CHLORIDE 20 MEQ
40 PACKET (EA) ORAL ONCE
Refills: 0 | Status: COMPLETED | OUTPATIENT
Start: 2022-03-02 | End: 2022-03-02

## 2022-03-02 RX ORDER — MAGNESIUM SULFATE 500 MG/ML
2 VIAL (ML) INJECTION ONCE
Refills: 0 | Status: COMPLETED | OUTPATIENT
Start: 2022-03-02 | End: 2022-03-02

## 2022-03-02 RX ADMIN — GABAPENTIN 300 MILLIGRAM(S): 400 CAPSULE ORAL at 13:23

## 2022-03-02 RX ADMIN — GABAPENTIN 300 MILLIGRAM(S): 400 CAPSULE ORAL at 05:41

## 2022-03-02 RX ADMIN — Medication 650 MILLIGRAM(S): at 18:17

## 2022-03-02 RX ADMIN — Medication 40 MILLIEQUIVALENT(S): at 08:59

## 2022-03-02 RX ADMIN — Medication 10 MILLIGRAM(S): at 05:41

## 2022-03-02 RX ADMIN — BICTEGRAVIR SODIUM, EMTRICITABINE, AND TENOFOVIR ALAFENAMIDE FUMARATE 1 TABLET(S): 30; 120; 15 TABLET ORAL at 12:44

## 2022-03-02 RX ADMIN — MAGNESIUM OXIDE 400 MG ORAL TABLET 400 MILLIGRAM(S): 241.3 TABLET ORAL at 12:43

## 2022-03-02 RX ADMIN — Medication 75 MILLIGRAM(S): at 18:17

## 2022-03-02 RX ADMIN — Medication 75 MILLIGRAM(S): at 05:41

## 2022-03-02 RX ADMIN — Medication 5 MILLIGRAM(S): at 21:24

## 2022-03-02 RX ADMIN — QUETIAPINE FUMARATE 200 MILLIGRAM(S): 200 TABLET, FILM COATED ORAL at 18:16

## 2022-03-02 RX ADMIN — Medication 10 MILLIGRAM(S): at 13:23

## 2022-03-02 RX ADMIN — MAGNESIUM OXIDE 400 MG ORAL TABLET 400 MILLIGRAM(S): 241.3 TABLET ORAL at 12:47

## 2022-03-02 RX ADMIN — QUETIAPINE FUMARATE 100 MILLIGRAM(S): 200 TABLET, FILM COATED ORAL at 05:41

## 2022-03-02 RX ADMIN — Medication 10 MILLIGRAM(S): at 21:24

## 2022-03-02 RX ADMIN — GABAPENTIN 300 MILLIGRAM(S): 400 CAPSULE ORAL at 21:24

## 2022-03-03 LAB
ANION GAP SERPL CALC-SCNC: 13 MMOL/L — SIGNIFICANT CHANGE UP (ref 5–17)
BUN SERPL-MCNC: 8.8 MG/DL — SIGNIFICANT CHANGE UP (ref 8–20)
CALCIUM SERPL-MCNC: 8.6 MG/DL — SIGNIFICANT CHANGE UP (ref 8.6–10.2)
CHLORIDE SERPL-SCNC: 103 MMOL/L — SIGNIFICANT CHANGE UP (ref 98–107)
CO2 SERPL-SCNC: 22 MMOL/L — SIGNIFICANT CHANGE UP (ref 22–29)
CREAT SERPL-MCNC: 0.73 MG/DL — SIGNIFICANT CHANGE UP (ref 0.5–1.3)
EGFR: 124 ML/MIN/1.73M2 — SIGNIFICANT CHANGE UP
GLUCOSE SERPL-MCNC: 125 MG/DL — HIGH (ref 70–99)
HCT VFR BLD CALC: 40 % — SIGNIFICANT CHANGE UP (ref 39–50)
HGB BLD-MCNC: 12.8 G/DL — LOW (ref 13–17)
MAGNESIUM SERPL-MCNC: 1.6 MG/DL — SIGNIFICANT CHANGE UP (ref 1.6–2.6)
MCHC RBC-ENTMCNC: 28.9 PG — SIGNIFICANT CHANGE UP (ref 27–34)
MCHC RBC-ENTMCNC: 32 GM/DL — SIGNIFICANT CHANGE UP (ref 32–36)
MCV RBC AUTO: 90.3 FL — SIGNIFICANT CHANGE UP (ref 80–100)
PHOSPHATE SERPL-MCNC: 2.6 MG/DL — SIGNIFICANT CHANGE UP (ref 2.4–4.7)
PLATELET # BLD AUTO: 222 K/UL — SIGNIFICANT CHANGE UP (ref 150–400)
POTASSIUM SERPL-MCNC: 3.3 MMOL/L — LOW (ref 3.5–5.3)
POTASSIUM SERPL-SCNC: 3.3 MMOL/L — LOW (ref 3.5–5.3)
RBC # BLD: 4.43 M/UL — SIGNIFICANT CHANGE UP (ref 4.2–5.8)
RBC # FLD: 12.4 % — SIGNIFICANT CHANGE UP (ref 10.3–14.5)
SODIUM SERPL-SCNC: 138 MMOL/L — SIGNIFICANT CHANGE UP (ref 135–145)
WBC # BLD: 2.59 K/UL — LOW (ref 3.8–10.5)
WBC # FLD AUTO: 2.59 K/UL — LOW (ref 3.8–10.5)

## 2022-03-03 PROCEDURE — 99232 SBSQ HOSP IP/OBS MODERATE 35: CPT

## 2022-03-03 RX ORDER — POTASSIUM PHOSPHATE, MONOBASIC POTASSIUM PHOSPHATE, DIBASIC 236; 224 MG/ML; MG/ML
15 INJECTION, SOLUTION INTRAVENOUS ONCE
Refills: 0 | Status: DISCONTINUED | OUTPATIENT
Start: 2022-03-03 | End: 2022-03-03

## 2022-03-03 RX ORDER — POTASSIUM CHLORIDE 20 MEQ
20 PACKET (EA) ORAL ONCE
Refills: 0 | Status: COMPLETED | OUTPATIENT
Start: 2022-03-03 | End: 2022-03-03

## 2022-03-03 RX ORDER — SODIUM,POTASSIUM PHOSPHATES 278-250MG
1 POWDER IN PACKET (EA) ORAL ONCE
Refills: 0 | Status: COMPLETED | OUTPATIENT
Start: 2022-03-03 | End: 2022-03-03

## 2022-03-03 RX ORDER — MAGNESIUM OXIDE 400 MG ORAL TABLET 241.3 MG
400 TABLET ORAL
Refills: 0 | Status: COMPLETED | OUTPATIENT
Start: 2022-03-03 | End: 2022-03-03

## 2022-03-03 RX ADMIN — Medication 10 MILLIGRAM(S): at 17:48

## 2022-03-03 RX ADMIN — BICTEGRAVIR SODIUM, EMTRICITABINE, AND TENOFOVIR ALAFENAMIDE FUMARATE 1 TABLET(S): 30; 120; 15 TABLET ORAL at 11:43

## 2022-03-03 RX ADMIN — GABAPENTIN 300 MILLIGRAM(S): 400 CAPSULE ORAL at 21:06

## 2022-03-03 RX ADMIN — Medication 75 MILLIGRAM(S): at 05:19

## 2022-03-03 RX ADMIN — MAGNESIUM OXIDE 400 MG ORAL TABLET 400 MILLIGRAM(S): 241.3 TABLET ORAL at 11:44

## 2022-03-03 RX ADMIN — Medication 20 MILLIEQUIVALENT(S): at 11:43

## 2022-03-03 RX ADMIN — QUETIAPINE FUMARATE 100 MILLIGRAM(S): 200 TABLET, FILM COATED ORAL at 05:18

## 2022-03-03 RX ADMIN — GABAPENTIN 300 MILLIGRAM(S): 400 CAPSULE ORAL at 05:19

## 2022-03-03 RX ADMIN — Medication 10 MILLIGRAM(S): at 21:06

## 2022-03-03 RX ADMIN — ENOXAPARIN SODIUM 40 MILLIGRAM(S): 100 INJECTION SUBCUTANEOUS at 21:06

## 2022-03-03 RX ADMIN — Medication 75 MILLIGRAM(S): at 17:49

## 2022-03-03 RX ADMIN — QUETIAPINE FUMARATE 200 MILLIGRAM(S): 200 TABLET, FILM COATED ORAL at 17:48

## 2022-03-03 RX ADMIN — Medication 5 MILLIGRAM(S): at 21:06

## 2022-03-03 RX ADMIN — Medication 10 MILLIGRAM(S): at 05:18

## 2022-03-03 RX ADMIN — GABAPENTIN 300 MILLIGRAM(S): 400 CAPSULE ORAL at 17:50

## 2022-03-03 NOTE — PHYSICAL THERAPY INITIAL EVALUATION ADULT - PERTINENT HX OF CURRENT PROBLEM, REHAB EVAL
30 yo male with PMH of congenital HIV on BIKTARVY, reported GBS in 2018 with negative work up, recent admission at [facility] for b/l lower extremity weakness (left >right), patient had multiple imaging studies done in the recent past with negative study results. 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

## 2022-03-03 NOTE — PHYSICAL THERAPY INITIAL EVALUATION ADULT - ADDITIONAL COMMENTS
Pt reports independent with SAC until one week PTA, during that time pt was unable to ambulate secondary to LE weakness. Pt reports difficulty ambulating with [facility]AC and feels unsteady.  Pt owns no other DME

## 2022-03-04 ENCOUNTER — TRANSCRIPTION ENCOUNTER (OUTPATIENT)
Age: 32
End: 2022-03-04

## 2022-03-04 VITALS
HEART RATE: 84 BPM | DIASTOLIC BLOOD PRESSURE: 74 MMHG | OXYGEN SATURATION: 98 % | TEMPERATURE: 98 F | SYSTOLIC BLOOD PRESSURE: 144 MMHG | RESPIRATION RATE: 16 BRPM

## 2022-03-04 LAB
ANION GAP SERPL CALC-SCNC: 14 MMOL/L — SIGNIFICANT CHANGE UP (ref 5–17)
BUN SERPL-MCNC: 10.5 MG/DL — SIGNIFICANT CHANGE UP (ref 8–20)
CALCIUM SERPL-MCNC: 8.7 MG/DL — SIGNIFICANT CHANGE UP (ref 8.6–10.2)
CHLORIDE SERPL-SCNC: 103 MMOL/L — SIGNIFICANT CHANGE UP (ref 98–107)
CO2 SERPL-SCNC: 22 MMOL/L — SIGNIFICANT CHANGE UP (ref 22–29)
CREAT SERPL-MCNC: 0.81 MG/DL — SIGNIFICANT CHANGE UP (ref 0.5–1.3)
EGFR: 120 ML/MIN/1.73M2 — SIGNIFICANT CHANGE UP
GLUCOSE SERPL-MCNC: 110 MG/DL — HIGH (ref 70–99)
HCT VFR BLD CALC: 39.1 % — SIGNIFICANT CHANGE UP (ref 39–50)
HGB BLD-MCNC: 12.3 G/DL — LOW (ref 13–17)
MAGNESIUM SERPL-MCNC: 1.6 MG/DL — SIGNIFICANT CHANGE UP (ref 1.6–2.6)
MCHC RBC-ENTMCNC: 28.1 PG — SIGNIFICANT CHANGE UP (ref 27–34)
MCHC RBC-ENTMCNC: 31.5 GM/DL — LOW (ref 32–36)
MCV RBC AUTO: 89.3 FL — SIGNIFICANT CHANGE UP (ref 80–100)
PHOSPHATE SERPL-MCNC: 3.5 MG/DL — SIGNIFICANT CHANGE UP (ref 2.4–4.7)
PLATELET # BLD AUTO: 242 K/UL — SIGNIFICANT CHANGE UP (ref 150–400)
POTASSIUM SERPL-MCNC: 3.7 MMOL/L — SIGNIFICANT CHANGE UP (ref 3.5–5.3)
POTASSIUM SERPL-SCNC: 3.7 MMOL/L — SIGNIFICANT CHANGE UP (ref 3.5–5.3)
RBC # BLD: 4.38 M/UL — SIGNIFICANT CHANGE UP (ref 4.2–5.8)
RBC # FLD: 12.4 % — SIGNIFICANT CHANGE UP (ref 10.3–14.5)
SARS-COV-2 RNA SPEC QL NAA+PROBE: SIGNIFICANT CHANGE UP
SODIUM SERPL-SCNC: 139 MMOL/L — SIGNIFICANT CHANGE UP (ref 135–145)
WBC # BLD: 3.29 K/UL — LOW (ref 3.8–10.5)
WBC # FLD AUTO: 3.29 K/UL — LOW (ref 3.8–10.5)

## 2022-03-04 PROCEDURE — 83615 LACTATE (LD) (LDH) ENZYME: CPT

## 2022-03-04 PROCEDURE — 36415 COLL VENOUS BLD VENIPUNCTURE: CPT

## 2022-03-04 PROCEDURE — 81003 URINALYSIS AUTO W/O SCOPE: CPT

## 2022-03-04 PROCEDURE — 85730 THROMBOPLASTIN TIME PARTIAL: CPT

## 2022-03-04 PROCEDURE — U0005: CPT

## 2022-03-04 PROCEDURE — 87449 NOS EACH ORGANISM AG IA: CPT

## 2022-03-04 PROCEDURE — 87086 URINE CULTURE/COLONY COUNT: CPT

## 2022-03-04 PROCEDURE — 74177 CT ABD & PELVIS W/CONTRAST: CPT | Mod: MA

## 2022-03-04 PROCEDURE — 87040 BLOOD CULTURE FOR BACTERIA: CPT

## 2022-03-04 PROCEDURE — 71045 X-RAY EXAM CHEST 1 VIEW: CPT

## 2022-03-04 PROCEDURE — 99239 HOSP IP/OBS DSCHRG MGMT >30: CPT

## 2022-03-04 PROCEDURE — 70450 CT HEAD/BRAIN W/O DYE: CPT | Mod: MA

## 2022-03-04 PROCEDURE — U0003: CPT

## 2022-03-04 PROCEDURE — 85027 COMPLETE CBC AUTOMATED: CPT

## 2022-03-04 PROCEDURE — 80048 BASIC METABOLIC PNL TOTAL CA: CPT

## 2022-03-04 PROCEDURE — 85025 COMPLETE CBC W/AUTO DIFF WBC: CPT

## 2022-03-04 PROCEDURE — 86360 T CELL ABSOLUTE COUNT/RATIO: CPT

## 2022-03-04 PROCEDURE — 86357 NK CELLS TOTAL COUNT: CPT

## 2022-03-04 PROCEDURE — 71260 CT THORAX DX C+: CPT | Mod: MA

## 2022-03-04 PROCEDURE — 87536 HIV-1 QUANT&REVRSE TRNSCRPJ: CPT

## 2022-03-04 PROCEDURE — 93005 ELECTROCARDIOGRAM TRACING: CPT

## 2022-03-04 PROCEDURE — 84100 ASSAY OF PHOSPHORUS: CPT

## 2022-03-04 PROCEDURE — 80061 LIPID PANEL: CPT

## 2022-03-04 PROCEDURE — 0225U NFCT DS DNA&RNA 21 SARSCOV2: CPT

## 2022-03-04 PROCEDURE — 85610 PROTHROMBIN TIME: CPT

## 2022-03-04 PROCEDURE — 83036 HEMOGLOBIN GLYCOSYLATED A1C: CPT

## 2022-03-04 PROCEDURE — 99285 EMERGENCY DEPT VISIT HI MDM: CPT

## 2022-03-04 PROCEDURE — 83735 ASSAY OF MAGNESIUM: CPT

## 2022-03-04 PROCEDURE — 86355 B CELLS TOTAL COUNT: CPT

## 2022-03-04 PROCEDURE — 80053 COMPREHEN METABOLIC PANEL: CPT

## 2022-03-04 PROCEDURE — 86359 T CELLS TOTAL COUNT: CPT

## 2022-03-04 RX ORDER — QUETIAPINE FUMARATE 200 MG/1
0 TABLET, FILM COATED ORAL
Qty: 0 | Refills: 0 | DISCHARGE

## 2022-03-04 RX ADMIN — Medication 10 MILLIGRAM(S): at 05:09

## 2022-03-04 RX ADMIN — QUETIAPINE FUMARATE 200 MILLIGRAM(S): 200 TABLET, FILM COATED ORAL at 17:07

## 2022-03-04 RX ADMIN — BICTEGRAVIR SODIUM, EMTRICITABINE, AND TENOFOVIR ALAFENAMIDE FUMARATE 1 TABLET(S): 30; 120; 15 TABLET ORAL at 11:39

## 2022-03-04 RX ADMIN — Medication 75 MILLIGRAM(S): at 05:09

## 2022-03-04 RX ADMIN — Medication 10 MILLIGRAM(S): at 13:30

## 2022-03-04 RX ADMIN — Medication 75 MILLIGRAM(S): at 17:25

## 2022-03-04 RX ADMIN — QUETIAPINE FUMARATE 100 MILLIGRAM(S): 200 TABLET, FILM COATED ORAL at 05:09

## 2022-03-04 RX ADMIN — GABAPENTIN 300 MILLIGRAM(S): 400 CAPSULE ORAL at 13:30

## 2022-03-04 RX ADMIN — GABAPENTIN 300 MILLIGRAM(S): 400 CAPSULE ORAL at 05:09

## 2022-03-04 RX ADMIN — MAGNESIUM OXIDE 400 MG ORAL TABLET 400 MILLIGRAM(S): 241.3 TABLET ORAL at 11:39

## 2022-03-04 NOTE — DISCHARGE NOTE PROVIDER - HOSPITAL COURSE
32 yo male with PMH of congenital HIV on BIKTARVY, reported GBS in 2018 with negative work up, recent admission at [facility] for b/l lower extremity weakness (left >right) who was admitted for generalized weakness secondary to Influenza A. He was seen by ID and Neurology. He was started on Tamiflu. Cultures showed NGTD. Per Neurology, weakness likely secondary to acute infection - no need for MRI. He was seen by PT who recommended [facility]. Patient symptomatically improved, no longer requires inpatient hospitalization and is stable for discharge. He is agreeable with discharge plan.

## 2022-03-04 NOTE — DISCHARGE NOTE PROVIDER - DISCHARGE DIET
90 day refill requested  By patient  Request received for a medication refill. Medication(s) set up as pending orders from medication list.    Caller has been advised that their call does not guarantee an immediate refill. This refill will be reviewed within 72 hours by a qualified provider who will determine whether the patient can refill the medication.    Patient has contacted the pharmacy-No but will in the future  Patient advised will receive a call back- YES    Additional information:?    Patient is completely out of medication(s)- NO    Patient’s preferred pharmacy has been noted and populated- YES    
Regular Diet - No restrictions

## 2022-03-04 NOTE — DISCHARGE NOTE PROVIDER - NSDCCPCAREPLAN_GEN_ALL_CORE_FT
PRINCIPAL DISCHARGE DIAGNOSIS  Diagnosis: Pneumonia due to influenza A virus  Assessment and Plan of Treatment: seen by ID. Started on Tamiflu.      SECONDARY DISCHARGE DIAGNOSES  Diagnosis: HIV disease  Assessment and Plan of Treatment: Biktary resumed.    Diagnosis: General weakness  Assessment and Plan of Treatment: secondary to infection. Seen by PT.

## 2022-03-04 NOTE — DISCHARGE NOTE PROVIDER - NSDCMRMEDTOKEN_GEN_ALL_CORE_FT
baclofen 10 mg oral tablet: 1 tab(s) orally 3 times a day  bictegravir/emtricitabine/tenofovir 50 mg-200 mg-25 mg oral tablet: 1 tab(s) orally once a day  gabapentin 300 mg oral capsule: 2 cap(s) orally 3 times a day  magnesium oxide 400 mg oral tablet: 1 tab(s) orally once a day  melatonin 5 mg oral tablet: 1 tab(s) orally once a day (at bedtime)  oseltamivir 75 mg oral capsule: 1 cap(s) orally 2 times a day until 3/6.  SEROquel 100 mg oral tablet: 100 mG in the morning and 200 mG in the evening. x

## 2022-03-04 NOTE — PROGRESS NOTE ADULT - REASON FOR ADMISSION
Acute Community acquired PNA

## 2022-03-04 NOTE — DISCHARGE NOTE PROVIDER - ATTENDING DISCHARGE PHYSICAL EXAMINATION:
Vital Signs Last 24 Hrs  T(F): 97.6 (04 Mar 2022 10:38), Max: 97.6 (04 Mar 2022 04:40)  HR: 84 (04 Mar 2022 10:38) (71 - 84)  BP: 102/67 (04 Mar 2022 10:38) (102/67 - 133/87)  RR: 18 (04 Mar 2022 10:38) (18 - 18)  SpO2: 95% (04 Mar 2022 10:38) (95% - 99%)    Physical Exam:  Constitutional: NAD  HEENT: NC/AT, PERRL, EOMI, trachea midline, no JVD  Respiratory: CTA bilaterally, symmetrical chest rise  Cardiovascular: RRR, no m/g/r  Gastrointestinal: Soft, NT/ND, BS+  Vascular: 2+ peripheral pulses  Neurological: A/O x 3, no focal neurological deficits  Psych: Fair mood/affect  Musculoskeletal: No edema, cyanosis, deformities. ROM normal  Skin: No obvious rash, lesions. No jaundice.

## 2022-03-04 NOTE — DISCHARGE NOTE NURSING/CASE MANAGEMENT/SOCIAL WORK - NSDCPEFALRISK_GEN_ALL_CORE
For information on Fall & Injury Prevention, visit: [email or web address] OR  [email or web address] OR  https://www.cdc.gov/steadi/patient.html

## 2022-03-04 NOTE — DISCHARGE NOTE NURSING/CASE MANAGEMENT/SOCIAL WORK - PATIENT PORTAL LINK FT
You can access the FollowMyHealth Patient Portal offered by [facility] by registering at the following website: [email or web address]. By joining [employer]’s FollowMyHealth portal, you will also be able to view your health information using other applications (apps) compatible with our system.

## 2022-03-04 NOTE — PROGRESS NOTE ADULT - ASSESSMENT
patient is a poor historian and does not co-operate with history and physical exam  30 yo male with PMH of congenital HIV on BIKTARVY, reported GBS in 2018 with negative work up, recent admission at [facility] for b/l lower extremity weakness (left >right), patient had multiple imaging studies done in the recent past with negative study results. 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 He was recommended rehab placement but he refused to go to rehab and was discharged home.    He presented to the ed for 2 days of pleuritic chest pain, fever and dry cough. he also complains of lower extremity weakness more on the left. he reports that he gets these weakness during his HIV flare ups. patient is able to ambulate.    Influenza A infection  - recent hospital discharge r/o superinfection (ct scan --suggestive of Pneumonia)  - Appreciate ID recs- c/w tamiflu  - Monitor off Abx, Cultures NGTD    Lower extremity weakness.   - likely in the setting of acute infection  - Extensive prior work-up and imaging inconsistent with myasthenia gravis and GBS. History and findings concerning for HIV myelopathy, however per ID, would typically only be seen in advanced diseas  - Ct scan negative for acute spine or head pathology  - Neuro following  - Pt refusing MRI spine, no need per Neurology as pt around baseline and likely exacerbated in setting of infection  - fall precautions  - ?functional component  - PT     HIV (human immunodeficiency virus infection)  - Patient on biktarvy  - Last CD4 262  - HIV viral load: 29,184  - C/w home medication.      DVT: Lovenox  Regular diet      Medically clear for discharge. Patient agreeable for [facility]. Awaiting auth.
patient is a poor historian and does not co-operate with history and physical exam  32 yo male with PMH of congenital HIV on BIKTARVY, reported GBS in 2018 with negative work up, recent admission at [facility] for b/l lower extremity weakness (left >right), patient had multiple imaging studies done in the recent past with negative study results. 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 He was recommended rehab placement but he refused to go to rehab and was discharged home.    He presented to the ed for 2 days of pleuritic chest pain, fever and dry cough. he also complains of lower extremity weakness more on the left. he reports that he gets these weakness during his HIV flare ups. patient is able to ambulate.    Influenza A infection  - recent hospital discharge r/o superinfection (ct scan --suggestive of Pneumonia)  - rule out pneumocystis infection --less likely but borderline CD4 count ~280 last month  - Appreciate ID recs- c/w tamiflu  - Monitor off Abx, Cultures NGTD    Lower extremity weakness.   - likely in the setting of acute infection  - Extensive prior work-up and imaging inconsistent with myasthenia gravis and GBS. History and findings concerning for HIV myelopathy, however per ID, would typically only be seen in advanced diseas  - Ct scan negative for acute spine or head pathology  - Neuro following  - Pt refusing MRI spine, no need per Neurology as pt around baseline and likely exacerbated in setting of infection  - fall precautions  - ?functional component  - PT consult pending    HIV (human immunodeficiency virus infection)  - Patient on biktarvy  - Last CD4 262  - HIV viral load: 29,184  - C/w home medication.      DVT: Lovenox  Regular diet  Updated patient's father, César ([phone]) on 3/1.    Discharge pending PT recommendations.  
This 30 yo male with PMH of congenital HIV on BIKTARVY, reported GBS in 2018 with negative work up, recent admission at [facility] for b/l lower extremity weakness (left >right), patient had multiple imaging studies done in the recent past with negative study results. 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 He was recommended rehab placement but he refused to go to rehab and was discharged home.  He presented to the ED for 2 days of pleuritic chest pain, fever and dry cough. he also complains of lower extremity weakness more on the left. he reports that he gets these weakness during his HIV flare ups. patient is able to ambulate.  denies exertional chest pain, palpitation, LOC, seizure, leg swelling, urinary retention or diarrhea (01 Mar 2022 00:48)    patient reports inconsistent compliance with his ART.  Recent VL 78425 and CD4 of 262 done at [facility] on 1/28/22.  Repeat CD4 this visit is at 415. Repeat VL is in process.     patient swabbed positive for influenza A H3 on RVP.  CT of the lung shows TIB infiltrates.       Impression:  fevers  influenza  viral pneumonia  lung infiltrates  HIV      Plan:  - for the influenza,   agree with Tamiflu course x 5 days  clinically improving    - patient not vaccinated for the influenza virus for the last 2 years.   Consider flu vaccine for next season. This season is almost over.       - hold off antibacterial agents for now.  watch closely       HIV  - CD4 improving between visit,   Viral load went down from 29K (1/27/22)  to 5K (3/1/22)  suggestive of better medication compliance   - continue Biktarvy.   Can follow up with his primary team when discharged      Please call me back if I may be of further assistance.   Thank you.      
  patient is a poor historian and does not co-operate with history and physical exam  30 yo male with PMH of congenital HIV on BIKTARVY, reported GBS in 2018 with negative work up, recent admission at [facility] for b/l lower extremity weakness (left >right), patient had multiple imaging studies done in the recent past with negative study results. 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 He was recommended rehab placement but he refused to go to rehab and was discharged home.    He presented to the ed for 2 days of pleuritic chest pain, fever and dry cough. he also complains of lower extremity weakness more on the left. he reports that he gets these weakness during his HIV flare ups. patient is able to ambulate.    Impression:  # Influenza A infection  - recent hospital discharge r/o superinfection (ct scan --suggestive of Pneumonia)  - rule out pneumocystis infection --less likely but borderline CD4 count ~280 last month  - send LDH, fungitell  - Appreciate ID recs- c/w tamiflu. Monitor off abx. F/u blood and urine cx     #Lower extremity weakness.   - likely in the setting of acute infection  - Extensive prior work-up and imaging inconsistent with myasthenia gravis and GBS. History and findings concerning for HIV myelopathy, however per ID, would typically only be seen in advanced diseas  - Ct scan negative for acute spine or head pathology  - Appreciate neuro recs- F/u MRI lumbar spine  - fall precaution  - ?functional component    # HIV (human immunodeficiency virus infection).   - Patient on biktarvy  - Last CD4 262  - HIV viral load: 29,184  - C/w home medication.    F/u PT c/s.  DVT: lovenox  Regular diet  Updated patient's father, César ([phone]) on 3/1.  
31y Male with myelopathy likely related to congenital HIV infection now admitted with pneumonia/influenza.     Myelopathy.  Now much improved. At baseline per patient.  Exacerbation of weakness in setting of pneumonia/influenza.  No further work up recommended at present.   Mobilize with physical therapy.     Flu/pneumonia.   Plan per medicine/ID.     HIV   Continue home meds.     No further specific neurologic recommendations. Will be available as needed. 
patient is a poor historian and does not co-operate with history and physical exam  30 yo male with PMH of congenital HIV on BIKTARVY, reported GBS in 2018 with negative work up, recent admission at [facility] for b/l lower extremity weakness (left >right), patient had multiple imaging studies done in the recent past with negative study results. 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 He was recommended rehab placement but he refused to go to rehab and was discharged home.    He presented to the ed for 2 days of pleuritic chest pain, fever and dry cough. he also complains of lower extremity weakness more on the left. he reports that he gets these weakness during his HIV flare ups. patient is able to ambulate.    Influenza A infection  - recent hospital discharge r/o superinfection (ct scan --suggestive of Pneumonia)  - rule out pneumocystis infection --less likely but borderline CD4 count ~280 last month  - Appreciate ID recs- c/w tamiflu  - Monitor ABx, follow Cultures.     Lower extremity weakness.   - likely in the setting of acute infection  - Extensive prior work-up and imaging inconsistent with myasthenia gravis and GBS. 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      DVT: Lovenox  Regular diet  Updated patient's father, César ([phone]) on 3/1.

## 2022-03-04 NOTE — PROGRESS NOTE ADULT - SUBJECTIVE AND OBJECTIVE BOX
[facility]                                                INFECTIOUS DISEASES  =======================================================                               Pro Cordoba MD#  Heber Ramsey MD*                                     Nabeel Jean-Baptiste MD*    Nilda Mendoza MD*            Diplomates American Board of Internal Medicine & Infectious Diseases                  # [place] Office - Appt - Tel  [phone] Fax [phone]                * [place] Office - Appt - Tel [phone] Fax [phone]                                  Hospital Consult line:  [phone]  =======================================================      [id number]  NELSON MITCHELL   follow up: HIV and influenza A H3      patient reports that he feels better.   not on supplemental oxygen.       I have personally reviewed the labs and data; pertinent labs and data are listed in this note; please see below.   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  No earache, sore throat or runny nose.  CARDIOVASCULAR:  No pressure, squeezing, strangling, tightness, heaviness or aching about the chest, neck, axilla or epigastrium.  RESPIRATORY:  No cough, shortness of breath  GASTROINTESTINAL:  No nausea, vomiting or diarrhea.  GENITOURINARY:  No dysuria, frequency or urgency. No Blood in urine  MUSCULOSKELETAL:  no joint aches, no muscle pain  SKIN:  No change in skin, hair or nails.  NEUROLOGIC:  No Headaches, seizures or weakness.  PSYCHIATRIC:  No disorder of thought or mood.  ENDOCRINE:  No heat or cold intolerance  HEMATOLOGICAL:  No easy bruising or bleeding.    =======================================================  Allergies  Ceclor (Unknown)  Toradol (Anaphylaxis; Swelling)      ======================================================  Physical Exam:  ============     General:  No acute distress.  Eye: Pupils are equal, round and reactive to light, Extraocular movements are intact, Normal conjunctiva.  HENT: Normocephalic, Oral mucosa is moist, No pharyngeal erythema, No sinus tenderness.  Neck: Supple, No lymphadenopathy.  Respiratory: Lungs with fair air entry  Cardiovascular: Normal rate, Regular rhythm,   Gastrointestinal: Soft, Non-tender, Non-distended, Normal bowel sounds.  Genitourinary: No costovertebral angle tenderness.  Lymphatics: No lymphadenopathy neck,   Musculoskeletal: Normal range of motion, Normal strength.  Integumentary: No rash.  Neurologic: Alert, Oriented, No focal deficits, Cranial Nerves II-XII are grossly intact.      =======================================================  Vitals:  ============  T(F): 97.8 (02 Mar 2022 10:03), Max: 98.2 (01 Mar 2022 19:12)  HR: 100 (02 Mar 2022 10:03)  BP: 124/80 (02 Mar 2022 10:03)  RR: 18 (02 Mar 2022 10:03)  SpO2: 96% (02 Mar 2022 10:03) (96% - 99%)  temp max in last 48H T(F): , Max: 102 (02-28-22 @ 18:06)    =======================================================  Current Antibiotics:  bictegravir 50 mG/emtricitabine 200 mG/tenofovir alafenamide 25 mG (BIKTARVY) 1 Tablet(s) Oral daily  oseltamivir 75 milliGRAM(s) Oral two times a day    Other medications:  baclofen 10 milliGRAM(s) Oral three times a day  enoxaparin Injectable 40 milliGRAM(s) SubCutaneous at bedtime  gabapentin 300 milliGRAM(s) Oral three times a day  magnesium oxide 400 milliGRAM(s) Oral daily  melatonin 5 milliGRAM(s) Oral at bedtime  QUEtiapine 100 milliGRAM(s) Oral <User Schedule>  QUEtiapine 200 milliGRAM(s) Oral <User Schedule>      =======================================================  Labs:                        11.6   3.16  )-----------( 224      ( 02 Mar 2022 05:40 )             36.2     03-02    136  |  101  |  10.8  ----------------------------<  108<H>  3.3<L>   |  20.0<L>  |  1.05    Ca    8.1<L>      02 Mar 2022 05:40  Phos  2.9     03-02  Mg     1.6     03-02    TPro  6.5<L>  /  Alb  3.3  /  TBili  0.5  /  DBili  x   /  AST  22  /  ALT  18  /  AlkPhos  75  03-02      Culture - Urine (collected 03-01-22 @ 11:16)  Source: Clean Catch Clean Catch (Midstream)  Final Report (03-02-22 @ 13:13):    No growth      Creatinine, Serum: 1.05 mg/dL (03-02-22 @ 05:40)  Creatinine, Serum: 0.83 mg/dL (03-01-22 @ 16:35)  Creatinine, Serum: 0.88 mg/dL (03-01-22 @ 03:45)  Creatinine, Serum: 1.05 mg/dL (02-28-22 @ 18:29)       WBC Count: 3.16 K/uL (03-02-22 @ 05:40)  WBC Count: 3.61 K/uL (03-01-22 @ 03:45)  WBC Count: 5.89 K/uL (02-28-22 @ 18:29)    SARS-CoV-2: NotDetec (02-28-22 @ 23:36)    Lactate Dehydrogenase, Serum: 235 U/L (03-01-22 @ 03:45)    Alkaline Phosphatase, Serum: 75 U/L (03-02-22 @ 05:40)  Alkaline Phosphatase, Serum: 75 U/L (03-01-22 @ 03:45)  Alkaline Phosphatase, Serum: 85 U/L (02-28-22 @ 18:29)  Alanine Aminotransferase (ALT/SGPT): 18 U/L (03-02-22 @ 05:40)  Alanine Aminotransferase (ALT/SGPT): 20 U/L (03-01-22 @ 03:45)  Alanine Aminotransferase (ALT/SGPT): 26 U/L (02-28-22 @ 18:29)  Aspartate Aminotransferase (AST/SGOT): 22 U/L (03-02-22 @ 05:40)  Aspartate Aminotransferase (AST/SGOT): 31 U/L (03-01-22 @ 03:45)  Aspartate Aminotransferase (AST/SGOT): 36 U/L (02-28-22 @ 18:29)  Bilirubin Total, Serum: 0.5 mg/dL (03-02-22 @ 05:40)  Bilirubin Total, Serum: 0.7 mg/dL (03-01-22 @ 03:45)  Bilirubin Total, Serum: 0.8 mg/dL (02-28-22 @ 18:29)       < from: CT Chest w/ IV Cont (02.28.22 @ 20:19) >    ACC: [id number] EXAM:  CT ABDOMEN AND PELVIS IC                        ACC: [id number] EXAM:  CT CHEST IC                          PROCEDURE DATE:  02/28/2022          INTERPRETATION:  CLINICAL INFORMATION: Sepsis    COMPARISON: CT chest 10/25/2020 andCT abdomen pelvis 12/12/2020    CONTRAST/COMPLICATIONS:  IV Contrast: Omnipaque 300 (accession [id number]), IV contrast documented   in associated exam (accession [id number])  93 cc administered   7 cc   discarded  Oral Contrast: NONE  Complications: None reported at time of study completion    PROCEDURE:  CT of the Chest, Abdomen and Pelvis was performed.  Sagittal and coronal reformats were performed.    FINDINGS: Motion artifacts degrade some of the imaging.  CHEST:  LUNGS AND LARGE AIRWAYS: Airway wall thickening with minor areas of   scattered mucous plugging, greatest within the right middle lobe.  Mild   upper lobe paraseptal emphysema. Patchy tree-in-bud groundglass   infiltrates within the right middle and right lower lobes and also to   lesser degree within lingula and left lower lobe  PLEURA: Trace pleural effusions  VESSELS: Within normal limits.  HEART: Heart size is normal. No pericardial effusion.  MEDIASTINUM AND LUIZ: No lymphadenopathy. Small volume of residual versus   hyperplastic thymic tissue.  CHEST WALL AND LOWER NECK: Mild bilateral gynecomastia    ABDOMEN AND PELVIS:  LIVER: Mild focal fat deposition adjacent to the falciform ligament. Mild   diffuse hepatic steatosis  BILE DUCTS: Normal caliber.  GALLBLADDER: Decompressed  SPLEEN: Within normal limits.  PANCREAS: Within normal limits.  ADRENALS: Within normal limits.  KIDNEYS/URETERS: Symmetric renal enhancement without hydronephrosis. Few   subcentimeter bilateral hypodensities too small for definitive   characterization    BLADDER: Within normal limits.  REPRODUCTIVE ORGANS: Prostate within normal limits.    BOWEL: No bowel obstruction. Appendix is normal.  PERITONEUM: Trace dependent pelvic fluid.  VESSELS: Within normal limits.  RETROPERITONEUM/LYMPHNODES: No lymphadenopathy.  ABDOMINAL WALL: Small fat-containing umbilical hernia. Mild dependent   subcutaneous edema.  BONES: Within normal limits.    IMPRESSION:    Patchy tree-in-bud groundglass infiltrates likely represent bronchopneumonia.    Mild diffuse hepatic steatosis    Trace dependent pelvic fluid        --- End of Report ---           JEREMY ANKOLA MD; Attending Radiologist  This document has been electronically signed. Feb 28 2022  8:41PM    < end of copied text >    
                            [facility]                                        [facility] [place]                                 Jolanta Marte, & Rene                                  [address]                                        [place], [place], [address]                                             [phone]        CC: Legs weak/HIV myelopathy.    HPI:   The patient is a 31 year old man whom we had initially seen in March of 2020 when he presented with bilateral increased weakness of the lower extremities.   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   The exacerbation presented acutely and stroke work up was done which was negative.  He had reportedly been at [facility] in 2018 where the diagnosis of GBS was made despite negative CSF.   He was admitted at [facility] in January of 2022 with chest pain and was noted to have bilateral leg weakness again and the neurology service was consulted.   It was clear that the diagnosis of Guillain Dickerson syndrome was incorrect based upon the negative work up and his exam showing hyperreflexia of the lower extremities.   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    He now returns to the ER with cough and pleuritic chest pain which began 3 days prior to arrival.  His lower extremity weakness has worsened.   He reports that this typically worsens when he has respiratory compromise.     Interim history:  Now on [address] [place].   Legs much improved from arrival.    ROS:   Denies headache or dizziness.  Denies chest pain.  Denies shortness of breath.    MEDICATIONS  (STANDING):  baclofen 10 milliGRAM(s) Oral three times a day  bictegravir 50 mG/emtricitabine 200 mG/tenofovir alafenamide 25 mG (BIKTARVY) 1 Tablet(s) Oral daily  enoxaparin Injectable 40 milliGRAM(s) SubCutaneous at bedtime  gabapentin 300 milliGRAM(s) Oral three times a day  magnesium oxide 400 milliGRAM(s) Oral once  magnesium oxide 400 milliGRAM(s) Oral daily  melatonin 5 milliGRAM(s) Oral at bedtime  oseltamivir 75 milliGRAM(s) Oral two times a day  QUEtiapine 100 milliGRAM(s) Oral <User Schedule>  QUEtiapine 200 milliGRAM(s) Oral <User Schedule>    Vital Signs Last 24 Hrs  T(C): 36.6 (02 Mar 2022 10:03), Max: 38 (01 Mar 2022 11:34)  T(F): 97.8 (02 Mar 2022 10:03), Max: 100.4 (01 Mar 2022 11:34)  HR: 100 (02 Mar 2022 10:03) (88 - 100)  BP: 124/80 (02 Mar 2022 10:03) (97/61 - 131/79  RR: 18 (02 Mar 2022 10:03) (18 - 20)  SpO2: 96% (02 Mar 2022 10:03) (96% - 99%)    Detailed Neurologic Exam:    Mental status: The patient is awake and more alert today. There is no aphasia. There is no dysarthria.     Cranial nerves: Pupils equal and react symmetrically to light. There is no visual field deficit to threat. Extraocular motion is full with no nystagmus. Facial sensation is intact. Facial musculature is symmetric. Palate elevates symmetrically. Tongue is midline.    Motor: There is normal bulk and tone.  There is no tremor.  Strength grossly 5/5 in the upper extremities bilaterally.  LE strength is 4/5 throughout.    Sensation: Grossly intact to light touch and pin.    Reflexes: 2+ in the upper extremities, but 3+ in the lower extremities and plantar responses are extensor bilaterally. There is clonus of both ankles which is nonsustained at 3-4 beats bilaterally.    Cerebellar: No dysmetria on finger nose testing.    Labs:     03-02    136  |  101  |  10.8  ----------------------------<  108<H>  3.3<L>   |  20.0<L>  |  1.05    Ca    8.1<L>      02 Mar 2022 05:40  Phos  2.9     03-02  Mg     1.6     03-02    TPro  6.5<L>  /  Alb  3.3  /  TBili  0.5  /  DBili  x   /  AST  22  /  ALT  18  /  AlkPhos  75  03-02                            11.6   3.16  )-----------( 224      ( 02 Mar 2022 05:40 )             36.2           
[facility] Division of Hospital Medicine  Abhinav Aldrich MD    Chief Complaint:  Patient is a 31y old  Male who presents with a chief complaint of Acute Community acquired PNA (02 Mar 2022 10:40)      SUBJECTIVE / OVERNIGHT EVENTS:  Patient seen and examined at bedside. No acute events reported overnight. No new complaints.    Patient denies chest pain, SOB, abd pain, N/V, fever, chills, dysuria or any other complaints. All remainder ROS negative.     MEDICATIONS  (STANDING):  baclofen 10 milliGRAM(s) Oral three times a day  bictegravir 50 mG/emtricitabine 200 mG/tenofovir alafenamide 25 mG (BIKTARVY) 1 Tablet(s) Oral daily  enoxaparin Injectable 40 milliGRAM(s) SubCutaneous at bedtime  gabapentin 300 milliGRAM(s) Oral three times a day  magnesium oxide 400 milliGRAM(s) Oral once  magnesium oxide 400 milliGRAM(s) Oral daily  melatonin 5 milliGRAM(s) Oral at bedtime  oseltamivir 75 milliGRAM(s) Oral two times a day  QUEtiapine 100 milliGRAM(s) Oral <User Schedule>  QUEtiapine 200 milliGRAM(s) Oral <User Schedule>    MEDICATIONS  (PRN):  acetaminophen     Tablet .. 650 milliGRAM(s) Oral every 6 hours PRN Temp greater or equal to 38C (100.4F), Mild Pain (1 - 3)  LORazepam   Injectable 2 milliGRAM(s) IV Push once PRN Give Before MRI        I&O's Summary      PHYSICAL EXAM:  Vital Signs Last 24 Hrs  T(C): 36.6 (02 Mar 2022 10:03), Max: 38 (01 Mar 2022 11:34)  T(F): 97.8 (02 Mar 2022 10:03), Max: 100.4 (01 Mar 2022 11:34)  HR: 100 (02 Mar 2022 10:03) (88 - 100)  BP: 124/80 (02 Mar 2022 10:03) (97/61 - 131/79)  BP(mean): --  RR: 18 (02 Mar 2022 10:03) (18 - 20)  SpO2: 96% (02 Mar 2022 10:03) (96% - 99%)        CONSTITUTIONAL: NAD, fatigued.  HEENT: NC/AT, PERRL, no JVD  RESPIRATORY: CTA bilaterally, normal effort  CARDIOVASCULAR: RRR, S1/S2+, no m/g/r  ABDOMEN: Nontender to palpation, normoactive bowel sounds, no rebound/guarding; No hepatosplenomegaly  MUSCULOSKELETAL: No edema, cyanosis or deformities.  PSYCH: A+O to person, place, and time; affect appropriate  NEUROLOGY: CN 2-12 are intact and symmetric; no gross neurological deficits.  SKIN: No rashes; no palpable lesions  VASC: Distal pulses palpable    LABS:                        11.6   3.16  )-----------( 224      ( 02 Mar 2022 05:40 )             36.2     03-02    136  |  101  |  10.8  ----------------------------<  108<H>  3.3<L>   |  20.0<L>  |  1.05    Ca    8.1<L>      02 Mar 2022 05:40  Phos  2.9     03-02  Mg     1.6     03-02    TPro  6.5<L>  /  Alb  3.3  /  TBili  0.5  /  DBili  x   /  AST  22  /  ALT  18  /  AlkPhos  75  03-02    PT/INR - ( 01 Mar 2022 03:45 )   PT: 14.2 sec;   INR: 1.22 ratio         PTT - ( 2022 18:29 )  PTT:34.0 sec      Urinalysis Basic - ( 01 Mar 2022 05:41 )    Color: Yellow / Appearance: Clear / S.010 / pH: x  Gluc: x / Ketone: Negative  / Bili: Negative / Urobili: Negative mg/dL   Blood: x / Protein: Negative / Nitrite: Negative   Leuk Esterase: Negative / RBC: x / WBC x   Sq Epi: x / Non Sq Epi: x / Bacteria: x        CAPILLARY BLOOD GLUCOSE            RADIOLOGY & ADDITIONAL TESTS:  Results Reviewed:   Imaging Personally Reviewed:  Electrocardiogram Personally Reviewed:                                          
[facility] Division of Hospital Medicine    Chief Complaint:  Community acquired PNA     SUBJECTIVE / OVERNIGHT EVENTS: No acute events overnight. HD stable. Patient endorses chest tightness. Denies SOB, n/v/h/d.    Patient denies chest pain, SOB, abd pain, N/V, fever, chills, dysuria or any other complaints. All remainder ROS negative.     MEDICATIONS  (STANDING):  baclofen 10 milliGRAM(s) Oral three times a day  bictegravir 50 mG/emtricitabine 200 mG/tenofovir alafenamide 25 mG (BIKTARVY) 1 Tablet(s) Oral daily  enoxaparin Injectable 40 milliGRAM(s) SubCutaneous at bedtime  gabapentin 300 milliGRAM(s) Oral three times a day  magnesium oxide 400 milliGRAM(s) Oral daily  melatonin 5 milliGRAM(s) Oral at bedtime  oseltamivir 75 milliGRAM(s) Oral two times a day  QUEtiapine 100 milliGRAM(s) Oral <User Schedule>  QUEtiapine 200 milliGRAM(s) Oral <User Schedule>    MEDICATIONS  (PRN):  acetaminophen     Tablet .. 650 milliGRAM(s) Oral every 6 hours PRN Temp greater or equal to 38C (100.4F), Mild Pain (1 - 3)        I&O's Summary      PHYSICAL EXAM:  Vital Signs Last 24 Hrs  T(C): 38 (01 Mar 2022 11:34), Max: 38.9 (2022 18:06)  T(F): 100.4 (01 Mar 2022 11:34), Max: 102 (2022 18:06)  HR: 96 (01 Mar 2022 11:34) (81 - 110)  BP: 121/74 (01 Mar 2022 11:34) (104/64 - 137/78)  BP(mean): --  RR: 18 (01 Mar 2022 11:34) (16 - 18)  SpO2: 96% (01 Mar 2022 11:34) (96% - 98%)      GENERAL: NAD, well-developed  HEAD:  Atraumatic, Normocephalic  EYES: EOMI, PERRLA, conjunctiva and sclera clear  NECK: Supple, No JVD  CHEST/LUNG: Clear to auscultation bilaterally; No wheeze  HEART: Regular rate and rhythm; No murmurs, rubs, or gallops  ABDOMEN: Soft, Nontender, Nondistended; Bowel sounds present  EXTREMITIES:  2+ Peripheral Pulses, No clubbing, cyanosis, or edema  PSYCH: AAOx3  NEUROLOGY: non-focal  SKIN: No rashes or lesions    LABS:                        12.5   3.61  )-----------( 234      ( 01 Mar 2022 03:45 )             38.0         140  |  104  |  8.3  ----------------------------<  122<H>  2.8<LL>   |  21.0<L>  |  0.88    Ca    7.5<L>      01 Mar 2022 03:45    TPro  6.2<L>  /  Alb  3.3  /  TBili  0.7  /  DBili  x   /  AST  31  /  ALT  20  /  AlkPhos  75      PT/INR - ( 01 Mar 2022 03:45 )   PT: 14.2 sec;   INR: 1.22 ratio         PTT - ( 2022 18:29 )  PTT:34.0 sec      Urinalysis Basic - ( 01 Mar 2022 05:41 )    Color: Yellow / Appearance: Clear / S.010 / pH: x  Gluc: x / Ketone: Negative  / Bili: Negative / Urobili: Negative mg/dL   Blood: x / Protein: Negative / Nitrite: Negative   Leuk Esterase: Negative / RBC: x / WBC x   Sq Epi: x / Non Sq Epi: x / Bacteria: x        CAPILLARY BLOOD GLUCOSE            RADIOLOGY & ADDITIONAL TESTS:  Results Reviewed:   Imaging Personally Reviewed:  Electrocardiogram Personally Reviewed:                                          
[facility] Division of Hospital Medicine  Abhinav Aldrich MD    Chief Complaint:  Patient is a 31y old  Male who presents with a chief complaint of Acute Community acquired PNA (02 Mar 2022 10:49)      SUBJECTIVE / OVERNIGHT EVENTS:  Patient seen and examined at bedside. No acute events reported overnight. No new complaints. Complaining of LBP.    Patient denies chest pain, SOB, abd pain, N/V, fever, chills, dysuria or any other complaints. All remainder ROS negative.     MEDICATIONS  (STANDING):  baclofen 10 milliGRAM(s) Oral three times a day  bictegravir 50 mG/emtricitabine 200 mG/tenofovir alafenamide 25 mG (BIKTARVY) 1 Tablet(s) Oral daily  enoxaparin Injectable 40 milliGRAM(s) SubCutaneous at bedtime  gabapentin 300 milliGRAM(s) Oral three times a day  magnesium oxide 400 milliGRAM(s) Oral daily  magnesium oxide 400 milliGRAM(s) Oral with breakfast  melatonin 5 milliGRAM(s) Oral at bedtime  oseltamivir 75 milliGRAM(s) Oral two times a day  potassium chloride    Tablet ER 20 milliEquivalent(s) Oral once  potassium phosphate / sodium phosphate Powder (PHOS-NaK) 1 Packet(s) Oral once  QUEtiapine 100 milliGRAM(s) Oral <User Schedule>  QUEtiapine 200 milliGRAM(s) Oral <User Schedule>    MEDICATIONS  (PRN):  acetaminophen     Tablet .. 650 milliGRAM(s) Oral every 6 hours PRN Temp greater or equal to 38C (100.4F), Mild Pain (1 - 3)  aluminum hydroxide/magnesium hydroxide/simethicone Suspension 30 milliLiter(s) Oral once PRN Dyspepsia        I&O's Summary      PHYSICAL EXAM:  Vital Signs Last 24 Hrs  T(C): 36.7 (03 Mar 2022 10:05), Max: 36.7 (03 Mar 2022 10:05)  T(F): 98 (03 Mar 2022 10:05), Max: 98 (03 Mar 2022 10:05)  HR: 76 (03 Mar 2022 10:05) (76 - 88)  BP: 114/72 (03 Mar 2022 10:05) (107/69 - 114/72)  BP(mean): --  RR: 18 (03 Mar 2022 10:05) (18 - 18)  SpO2: 98% (03 Mar 2022 10:05) (96% - 98%)        CONSTITUTIONAL: NAD  HEENT: NC/AT, PERRL, no JVD  RESPIRATORY: CTA bilaterally, normal effort  CARDIOVASCULAR: RRR, S1/S2+, no m/g/r  ABDOMEN: Nontender to palpation, normoactive bowel sounds, no rebound/guarding; No hepatosplenomegaly  MUSCULOSKELETAL: No edema, cyanosis or deformities. LBP ttp  PSYCH: A+O to person, place, and time; affect appropriate  NEUROLOGY: CN 2-12 are intact and symmetric; no gross neurological deficits.  SKIN: No rashes; no palpable lesions  VASC: Distal pulses palpable    LABS:                        12.8   2.59  )-----------( 222      ( 03 Mar 2022 05:36 )             40.0     03-03    138  |  103  |  8.8  ----------------------------<  125<H>  3.3<L>   |  22.0  |  0.73    Ca    8.6      03 Mar 2022 05:36  Phos  2.6     03-03  Mg     1.6     03-03    TPro  6.5<L>  /  Alb  3.3  /  TBili  0.5  /  DBili  x   /  AST  22  /  ALT  18  /  AlkPhos  75  03-02              Culture - Urine (collected 01 Mar 2022 11:16)  Source: Clean Catch Clean Catch (Midstream)  Final Report (02 Mar 2022 13:13):    No growth    Culture - Blood (collected 01 Mar 2022 03:45)  Source: .Blood Blood-Peripheral  Preliminary Report (03 Mar 2022 05:00):    No growth at 48 hours    Culture - Blood (collected 01 Mar 2022 03:45)  Source: .Blood Blood-Peripheral  Preliminary Report (03 Mar 2022 05:00):    No growth at 48 hours      CAPILLARY BLOOD GLUCOSE            RADIOLOGY & ADDITIONAL TESTS:  Results Reviewed:   Imaging Personally Reviewed:  Electrocardiogram Personally Reviewed:                                          
[facility] Division of Hospital Medicine  Abhinav Aldrich MD    Chief Complaint:  Patient is a 31y old  Male who presents with a chief complaint of Acute Community acquired PNA (03 Mar 2022 10:15)      SUBJECTIVE / OVERNIGHT EVENTS:  Patient seen and examined at bedside. No acute events reported overnight. No new complaints. States he feels better.    Patient denies chest pain, SOB, abd pain, N/V, fever, chills, dysuria or any other complaints. All remainder ROS negative.     MEDICATIONS  (STANDING):  baclofen 10 milliGRAM(s) Oral three times a day  bictegravir 50 mG/emtricitabine 200 mG/tenofovir alafenamide 25 mG (BIKTARVY) 1 Tablet(s) Oral daily  enoxaparin Injectable 40 milliGRAM(s) SubCutaneous at bedtime  gabapentin 300 milliGRAM(s) Oral three times a day  magnesium oxide 400 milliGRAM(s) Oral daily  melatonin 5 milliGRAM(s) Oral at bedtime  oseltamivir 75 milliGRAM(s) Oral two times a day  QUEtiapine 100 milliGRAM(s) Oral <User Schedule>  QUEtiapine 200 milliGRAM(s) Oral <User Schedule>    MEDICATIONS  (PRN):  acetaminophen     Tablet .. 650 milliGRAM(s) Oral every 6 hours PRN Temp greater or equal to 38C (100.4F), Mild Pain (1 - 3)  aluminum hydroxide/magnesium hydroxide/simethicone Suspension 30 milliLiter(s) Oral once PRN Dyspepsia        I&O's Summary      PHYSICAL EXAM:  Vital Signs Last 24 Hrs  T(C): 36.4 (04 Mar 2022 04:40), Max: 36.7 (03 Mar 2022 10:05)  T(F): 97.6 (04 Mar 2022 04:40), Max: 98 (03 Mar 2022 10:05)  HR: 71 (04 Mar 2022 04:40) (71 - 76)  BP: 115/73 (04 Mar 2022 04:40) (114/72 - 133/87)  BP(mean): --  RR: 18 (04 Mar 2022 04:40) (18 - 18)  SpO2: 99% (04 Mar 2022 04:40) (98% - 99%)        CONSTITUTIONAL: NAD  HEENT: NC/AT, PERRL, no JVD  RESPIRATORY: CTA bilaterally, normal effort  CARDIOVASCULAR: RRR, S1/S2+, no m/g/r  ABDOMEN: Nontender to palpation, normoactive bowel sounds, no rebound/guarding; No hepatosplenomegaly  MUSCULOSKELETAL: No edema, cyanosis or deformities.  PSYCH: A+O to person, place, and time; affect appropriate  NEUROLOGY: CN 2-12 are intact and symmetric; no gross neurological deficits.  SKIN: No rashes; no palpable lesions  VASC: Distal pulses palpable    LABS:                        12.3   3.29  )-----------( 242      ( 04 Mar 2022 05:59 )             39.1     03-04    139  |  103  |  10.5  ----------------------------<  110<H>  3.7   |  22.0  |  0.81    Ca    8.7      04 Mar 2022 05:59  Phos  3.5     03-04  Mg     1.6     03-04                Culture - Urine (collected 01 Mar 2022 11:16)  Source: Clean Catch Clean Catch (Midstream)  Final Report (02 Mar 2022 13:13):    No growth      CAPILLARY BLOOD GLUCOSE            RADIOLOGY & ADDITIONAL TESTS:  Results Reviewed:   Imaging Personally Reviewed:  Electrocardiogram Personally Reviewed:

## 2022-03-07 NOTE — ED PROVIDER NOTE - NSCAREINITIATED _GEN_ER
Patient last saw Dr. Guerin on 11/11/21, and was told to follow-up with Adult Nephrology at [place].  Per a [employer] message Debra stated she is going to see the new doctor in March.    This is a faxed refill request for Lisinopril 2.5 mg Tab from [facility] @ [address], [place], [place].    Last fill was 2/5/22  
Sarahi Murphy)

## 2022-03-09 NOTE — ED ADULT TRIAGE NOTE - AS TEMP SITE
oral Siliq Counseling:  I discussed with the patient the risks of Siliq including but not limited to new or worsening depression, suicidal thoughts and behavior, immunosuppression, malignancy, posterior leukoencephalopathy syndrome, and serious infections.  The patient understands that monitoring is required including a PPD at baseline and must alert us or the primary physician if symptoms of infection or other concerning signs are noted. There is also a special program designed to monitor depression which is required with Siliq.

## 2022-04-01 NOTE — H&P ADULT - PROBLEM/PLAN-3
"Palliative Care follow-up  PC RN met with pt at  along with his niece Bonny and son Joaquin, with sister Ilene by phone. Kris was awake and participated in the discussion/POC as well. Overview of the clinical picture provided, including liver cirrhosis, progressive weakness, reliance on the cortrak, inability to manage secretions well, and fluctuating mentation. All questions answered and family expressed understanding. Joaquin encouraged his dad to talk about his wishes for the future. Kris expressed feeling like his family would think he \"wasn't fighting hard enough\" which they all assured him was not true. PC explained the paths for SNF/therapy to work on getting stronger or CC/hospice. In depth discussion of the feeding tube, its role, and natural process of the body to stop eating/drinking as it nears EoL. Kris expressed the feeding tube \"is okay staying in\" and they family feels it has allowed him to be more alert/present with them. PC asked Kris if is goal was SNF/therapy with the feeding tube or if his wish was comfort and EoL while keeping the feeding tube. He states \"I've resolved to focus just on my comfort.\" PC reiterated his wish for comfort/hospice/EoL while keeping the feeding tube and he agreed. PC discussed the plan for decreasing the TF to hopefully limit the amount of regurgitation/aspiration taking place, as well as stopping completely as he nears EoL to prevent further pain/suffering. He was in agreement with this plan and PC offered to have the team continue to encourage his involvement in these change as he is able.     Comfort care explained in detail, including medications aimed at controlling pain/anxiety, as well as focusing on Kris's QoL. Next steps discussed, including home with hospice and continuing feeds or GH placement but having to remove the TF. The family is collectively open to hearing these options and discussing further amongst themselves about the plan. In the " meantime, PC discussed lowering the TF to hopefully help with his secretions and adjusting this as needed based on his clinical picture/pt's wishes. Family was focused on making sure he was able to get rest. Kris denied pain at this time, but did endorse not being able to rest well. Will f/u with MD and BS RN regarding orders. Hospice choice discussed for [facility] and plan made for this RN to recruit their involvement. At this time, all questions answered and PC ensured they had this RN's number for any needs/questions.     Updates to RN and CM; Voalte to MD.    1440: Call returned by MD and updates provided. Choice faxed for [facility] hospice.      Plan: comfort care now; continuing the feeding tube for now but will titrate down as pt becomes less able to clear secretions independently or with any worsening respiratory distress    Thank you for allowing Palliative Care to support this patient and family. Contact [phone] for additional assistance, change in patient status, or with any questions/concerns.      DISPLAY PLAN FREE TEXT

## 2022-04-01 NOTE — ED PROVIDER NOTE - NSFOLLOWUPINSTRUCTIONS_ED_ALL_ED_FT
drink plenty of fluids  avoid illicit drug use  have close follow up with primary care and cardiology. referral to cardiology provided       Syncope    WHAT YOU NEED TO KNOW:    Syncope is also called fainting or passing out. Syncope is a sudden, temporary loss of consciousness, followed by a fall from a standing or sitting position. Syncope ranges from not serious to a sign of a more serious condition that needs to be treated. You can control some health conditions that cause syncope. Your healthcare providers can help you create a plan to manage syncope and prevent episodes.    DISCHARGE INSTRUCTIONS:    Seek care immediately if:   •You are bleeding because you hit your head when you fainted.       •You suddenly have double vision, difficulty speaking, numbness, and cannot move your arms or legs.      •You have chest pain and trouble breathing.      •You vomit blood or material that looks like coffee grounds.      •You see blood in your bowel movement.      Contact your healthcare provider if:   •You have new or worsening symptoms.      •You have another syncope episode.      •You have a headache, fast heartbeat, or feel too dizzy to stand up.      •You have questions or concerns about your condition or care.      Medicines:   •Medicines may be needed to help your heart pump strongly and regularly. Your healthcare provider may also make changes to any medicines that are causing syncope.       •Take your medicine as directed. Contact your healthcare provider if you think your medicine is not helping or if you have side effects. Tell him or her if you are allergic to any medicine. Keep a list of the medicines, vitamins, and herbs you take. Include the amounts, and when and why you take them. Bring the list or the pill bottles to follow-up visits. Carry your medicine list with you in case of an emergency.      Follow up with your healthcare provider as directed: Write down your questions so you remember to ask them during your visits.     Manage syncope:   •Keep a record of your syncope episodes. Include your symptoms and your activity before and after the episode. The record can help your healthcare provider find the cause of your syncope and help you manage episodes.      •Sit or lie down when needed. This includes when you feel dizzy, your throat is getting tight, and your vision changes. Raise your legs above the level of your heart.      •Take slow, deep breaths if you start to breathe faster with anxiety or fear. This can help decrease dizziness and the feeling that you might faint.       •Check your blood pressure often. This is important if you take medicine to lower your blood pressure. Check your blood pressure when you are lying down and when you are standing. Ask how often to check during the day. Keep a record of your blood pressure numbers. Your healthcare provider may use the record to help plan your treatment.  How to take a Blood Pressure           Prevent a syncope episode:   •Move slowly and let yourself get used to one position before you move to another position. This is very important when you change from a lying or sitting position to a standing position. Take some deep breaths before you stand up from a lying position. Stand up slowly. Sudden movements may cause a fainting spell. Sit on the side of the bed or couch for a few minutes before you stand up. If you are on bedrest, try to be upright for about 2 hours each day, or as directed. Do not lock your legs if you are standing for a long period of time. Move your legs and bend your knees to keep blood flowing.      •Follow your healthcare provider's recommendations. Your provider may recommend that you drink more liquids to prevent dehydration. You may also need to have more salt to keep your blood pressure from dropping too low and causing syncope. Your provider will tell you how much liquid and sodium to have each day. He or she will also tell you how much physical activity is safe for you. This will depend on what is causing your syncope.      •Watch for signs of low blood sugar. These include hunger, nervousness, sweating, and fast or fluttery heartbeats. Talk with your healthcare provider about ways to keep your blood sugar level steady.      •Do not strain if you are constipated. You may faint if you strain to have a bowel movement. Walking is the best way to get your bowels moving. Eat foods high in fiber to make it easier to have a bowel movement. Good examples are high-fiber cereals, beans, vegetables, and whole-grain breads. Prune juice may help make bowel movements softer.      •Be careful in hot weather. Heat can cause a syncope episode. Limit activity done outside on hot days. Physical activity in hot weather can lead to dehydration. This can cause an episode. PATIENT IS MEDICALLY FIT FOR CONFINEMENT       drink plenty of fluids  avoid illicit drug use  have close follow up with primary care and cardiology. referral to cardiology provided       Syncope    WHAT YOU NEED TO KNOW:    Syncope is also called fainting or passing out. Syncope is a sudden, temporary loss of consciousness, followed by a fall from a standing or sitting position. Syncope ranges from not serious to a sign of a more serious condition that needs to be treated. You can control some health conditions that cause syncope. Your healthcare providers can help you create a plan to manage syncope and prevent episodes.    DISCHARGE INSTRUCTIONS:    Seek care immediately if:   •You are bleeding because you hit your head when you fainted.       •You suddenly have double vision, difficulty speaking, numbness, and cannot move your arms or legs.      •You have chest pain and trouble breathing.      •You vomit blood or material that looks like coffee grounds.      •You see blood in your bowel movement.      Contact your healthcare provider if:   •You have new or worsening symptoms.      •You have another syncope episode.      •You have a headache, fast heartbeat, or feel too dizzy to stand up.      •You have questions or concerns about your condition or care.      Medicines:   •Medicines may be needed to help your heart pump strongly and regularly. Your healthcare provider may also make changes to any medicines that are causing syncope.       •Take your medicine as directed. Contact your healthcare provider if you think your medicine is not helping or if you have side effects. Tell him or her if you are allergic to any medicine. Keep a list of the medicines, vitamins, and herbs you take. Include the amounts, and when and why you take them. Bring the list or the pill bottles to follow-up visits. Carry your medicine list with you in case of an emergency.      Follow up with your healthcare provider as directed: Write down your questions so you remember to ask them during your visits.     Manage syncope:   •Keep a record of your syncope episodes. Include your symptoms and your activity before and after the episode. The record can help your healthcare provider find the cause of your syncope and help you manage episodes.      •Sit or lie down when needed. This includes when you feel dizzy, your throat is getting tight, and your vision changes. Raise your legs above the level of your heart.      •Take slow, deep breaths if you start to breathe faster with anxiety or fear. This can help decrease dizziness and the feeling that you might faint.       •Check your blood pressure often. This is important if you take medicine to lower your blood pressure. Check your blood pressure when you are lying down and when you are standing. Ask how often to check during the day. Keep a record of your blood pressure numbers. Your healthcare provider may use the record to help plan your treatment.  How to take a Blood Pressure           Prevent a syncope episode:   •Move slowly and let yourself get used to one position before you move to another position. This is very important when you change from a lying or sitting position to a standing position. Take some deep breaths before you stand up from a lying position. Stand up slowly. Sudden movements may cause a fainting spell. Sit on the side of the bed or couch for a few minutes before you stand up. If you are on bedrest, try to be upright for about 2 hours each day, or as directed. Do not lock your legs if you are standing for a long period of time. Move your legs and bend your knees to keep blood flowing.      •Follow your healthcare provider's recommendations. Your provider may recommend that you drink more liquids to prevent dehydration. You may also need to have more salt to keep your blood pressure from dropping too low and causing syncope. Your provider will tell you how much liquid and sodium to have each day. He or she will also tell you how much physical activity is safe for you. This will depend on what is causing your syncope.      •Watch for signs of low blood sugar. These include hunger, nervousness, sweating, and fast or fluttery heartbeats. Talk with your healthcare provider about ways to keep your blood sugar level steady.      •Do not strain if you are constipated. You may faint if you strain to have a bowel movement. Walking is the best way to get your bowels moving. Eat foods high in fiber to make it easier to have a bowel movement. Good examples are high-fiber cereals, beans, vegetables, and whole-grain breads. Prune juice may help make bowel movements softer.      •Be careful in hot weather. Heat can cause a syncope episode. Limit activity done outside on hot days. Physical activity in hot weather can lead to dehydration. This can cause an episode. Rhofade Counseling: Rhofade is a topical medication which can decrease superficial blood flow where applied. Side effects are uncommon and include stinging, redness and allergic reactions.

## 2022-04-15 NOTE — ED PROVIDER NOTE - DISPOSITION TYPE
Subjective


Progress Note Date: 04/08/22


Principal diagnosis: 


Cellulitis





Patient is a 72-year-old  male with multiple comorbidities and history 

of heavy drinking brought into the ER after pending the patient was on the floor

for 2-3 days noticed to have some discoloration of the toes along with the 

cellulitis to the left lateral foot area.


On today's evaluation that is 04/08/2022, patient remains to be afebrile, the 

patient  pain to the left foot is currently controlled, the patient denies chest

pain shortness of breath or cough no abdominal pain or diarrhea








Objective





- Vital Signs


Vital signs: 


                                   Vital Signs











Temp  98.2 F   04/08/22 08:00


 


Pulse  76   04/08/22 08:00


 


Resp  18   04/08/22 08:00


 


BP  142/65   04/08/22 08:00


 


Pulse Ox  93 L  04/08/22 08:00








                                 Intake & Output











 04/07/22 04/08/22 04/08/22





 18:59 06:59 18:59


 


Intake Total 960 1220 460


 


Output Total 1100 1200 


 


Balance -140 20 460


 


Intake:   


 


  Oral 960 1220 460


 


Output:   


 


  Urine 1100 1200 


 


Other:   


 


  Voiding Method External Catheter External Catheter External Catheter


 


  # Bowel Movements 1  














- Exam


GENERAL DESCRIPTION: An elderly male lying in bed in no distress





RESPIRATORY SYSTEM: Unlabored breathing , decreased breath sounds at bases





HEART: S1 S2 regular rate and rhythm ,





ABDOMEN: Soft , no tenderness





EXTREMITIES: Left foot with some erythema and drainage on the dressing








- Labs


CBC & Chem 7: 


                                 04/07/22 08:07





                                 04/07/22 08:07





Assessment and Plan


(1) Cellulitis


Status: Acute   Code(s): L03.90 - CELLULITIS, UNSPECIFIED   SNOMED Code(s): 

783895207


   


Plan: 


1patient with bilateral foot area swelling some redness blister formation 

especially to the left fifth toe and some clear drainage and minimal redness 

concerning for lower extremity cellulitis likely from gram-positive skin shiva.


2patient seemed to have shown clinical improvement with cefazolin to 2 g every 

8 hour to finish therapy with oral Keflex 7 days and to close the patient 

follow-up.


3local wound care to the open area with Aquacel silver dressing change every 48

hour.


 


Time with Patient: Less than 30 ADMIT

## 2022-04-20 NOTE — ED ADULT TRIAGE NOTE - HEIGHT IN CM
180.34 Cimzia Pregnancy And Lactation Text: This medication crosses the placenta but can be considered safe in certain situations. Cimzia may be excreted in breast milk.

## 2022-05-06 ENCOUNTER — EMERGENCY (EMERGENCY)
Facility: HOSPITAL | Age: 32
LOS: 0 days | Discharge: HOME | End: 2022-05-06
Attending: EMERGENCY MEDICINE
Payer: MEDICAID

## 2022-05-06 VITALS
HEART RATE: 74 BPM | OXYGEN SATURATION: 98 % | SYSTOLIC BLOOD PRESSURE: 135 MMHG | DIASTOLIC BLOOD PRESSURE: 84 MMHG | TEMPERATURE: 98 F | RESPIRATION RATE: 18 BRPM

## 2022-05-06 VITALS
TEMPERATURE: 98 F | RESPIRATION RATE: 16 BRPM | DIASTOLIC BLOOD PRESSURE: 93 MMHG | SYSTOLIC BLOOD PRESSURE: 137 MMHG | HEART RATE: 89 BPM | OXYGEN SATURATION: 98 % | WEIGHT: 211.64 LBS | HEIGHT: 72 IN

## 2022-05-06 DIAGNOSIS — M54.50 LOW BACK PAIN, UNSPECIFIED: ICD-10-CM

## 2022-05-06 DIAGNOSIS — Z59.00 HOMELESSNESS UNSPECIFIED: ICD-10-CM

## 2022-05-06 DIAGNOSIS — J45.909 UNSPECIFIED ASTHMA, UNCOMPLICATED: ICD-10-CM

## 2022-05-06 DIAGNOSIS — F17.290 NICOTINE DEPENDENCE, OTHER TOBACCO PRODUCT, UNCOMPLICATED: ICD-10-CM

## 2022-05-06 DIAGNOSIS — R49.0 DYSPHONIA: ICD-10-CM

## 2022-05-06 DIAGNOSIS — R09.81 NASAL CONGESTION: ICD-10-CM

## 2022-05-06 DIAGNOSIS — R05.1 ACUTE COUGH: ICD-10-CM

## 2022-05-06 DIAGNOSIS — B20 HUMAN IMMUNODEFICIENCY VIRUS [HIV] DISEASE: ICD-10-CM

## 2022-05-06 DIAGNOSIS — Z98.890 OTHER SPECIFIED POSTPROCEDURAL STATES: Chronic | ICD-10-CM

## 2022-05-06 DIAGNOSIS — Z20.822 CONTACT WITH AND (SUSPECTED) EXPOSURE TO COVID-19: ICD-10-CM

## 2022-05-06 DIAGNOSIS — Z88.6 ALLERGY STATUS TO ANALGESIC AGENT: ICD-10-CM

## 2022-05-06 DIAGNOSIS — G89.29 OTHER CHRONIC PAIN: ICD-10-CM

## 2022-05-06 DIAGNOSIS — Z88.1 ALLERGY STATUS TO OTHER ANTIBIOTIC AGENTS STATUS: ICD-10-CM

## 2022-05-06 DIAGNOSIS — R07.0 PAIN IN THROAT: ICD-10-CM

## 2022-05-06 LAB
ADD ON TEST-SPECIMEN IN LAB: SIGNIFICANT CHANGE UP
BASOPHILS # BLD AUTO: 0.03 K/UL — SIGNIFICANT CHANGE UP (ref 0–0.2)
BASOPHILS NFR BLD AUTO: 0.4 % — SIGNIFICANT CHANGE UP (ref 0–2)
CK SERPL-CCNC: 477 U/L — HIGH (ref 26–308)
EOSINOPHIL # BLD AUTO: 0.18 K/UL — SIGNIFICANT CHANGE UP (ref 0–0.5)
EOSINOPHIL NFR BLD AUTO: 2.5 % — SIGNIFICANT CHANGE UP (ref 0–6)
FLUAV AG NPH QL: SIGNIFICANT CHANGE UP
FLUBV AG NPH QL: SIGNIFICANT CHANGE UP
HCT VFR BLD CALC: 38.9 % — LOW (ref 39–50)
HGB BLD-MCNC: 12.5 G/DL — LOW (ref 13–17)
IMM GRANULOCYTES NFR BLD AUTO: 0.4 % — SIGNIFICANT CHANGE UP (ref 0–1.5)
LYMPHOCYTES # BLD AUTO: 2.7 K/UL — SIGNIFICANT CHANGE UP (ref 1–3.3)
LYMPHOCYTES # BLD AUTO: 37.9 % — SIGNIFICANT CHANGE UP (ref 13–44)
MCHC RBC-ENTMCNC: 28.5 PG — SIGNIFICANT CHANGE UP (ref 27–34)
MCHC RBC-ENTMCNC: 32.1 GM/DL — SIGNIFICANT CHANGE UP (ref 32–36)
MCV RBC AUTO: 88.6 FL — SIGNIFICANT CHANGE UP (ref 80–100)
MONOCYTES # BLD AUTO: 0.8 K/UL — SIGNIFICANT CHANGE UP (ref 0–0.9)
MONOCYTES NFR BLD AUTO: 11.2 % — SIGNIFICANT CHANGE UP (ref 2–14)
NEUTROPHILS # BLD AUTO: 3.38 K/UL — SIGNIFICANT CHANGE UP (ref 1.8–7.4)
NEUTROPHILS NFR BLD AUTO: 47.6 % — SIGNIFICANT CHANGE UP (ref 43–77)
PLATELET # BLD AUTO: 467 K/UL — HIGH (ref 150–400)
RBC # BLD: 4.39 M/UL — SIGNIFICANT CHANGE UP (ref 4.2–5.8)
RBC # FLD: 12.4 % — SIGNIFICANT CHANGE UP (ref 10.3–14.5)
RSV RNA NPH QL NAA+NON-PROBE: SIGNIFICANT CHANGE UP
SARS-COV-2 RNA SPEC QL NAA+PROBE: SIGNIFICANT CHANGE UP
TROPONIN I, HIGH SENSITIVITY RESULT: <3 NG/L — SIGNIFICANT CHANGE UP
WBC # BLD: 7.12 K/UL — SIGNIFICANT CHANGE UP (ref 3.8–10.5)
WBC # FLD AUTO: 7.12 K/UL — SIGNIFICANT CHANGE UP (ref 3.8–10.5)

## 2022-05-06 PROCEDURE — 99285 EMERGENCY DEPT VISIT HI MDM: CPT | Mod: 25

## 2022-05-06 PROCEDURE — 71045 X-RAY EXAM CHEST 1 VIEW: CPT | Mod: 26

## 2022-05-06 PROCEDURE — 99285 EMERGENCY DEPT VISIT HI MDM: CPT

## 2022-05-06 PROCEDURE — 0241U: CPT

## 2022-05-06 PROCEDURE — 36415 COLL VENOUS BLD VENIPUNCTURE: CPT

## 2022-05-06 PROCEDURE — 82550 ASSAY OF CK (CPK): CPT

## 2022-05-06 PROCEDURE — 93010 ELECTROCARDIOGRAM REPORT: CPT

## 2022-05-06 PROCEDURE — 72131 CT LUMBAR SPINE W/O DYE: CPT | Mod: MA

## 2022-05-06 PROCEDURE — 85025 COMPLETE CBC W/AUTO DIFF WBC: CPT

## 2022-05-06 PROCEDURE — 72131 CT LUMBAR SPINE W/O DYE: CPT | Mod: 26,MA

## 2022-05-06 PROCEDURE — 93005 ELECTROCARDIOGRAM TRACING: CPT

## 2022-05-06 PROCEDURE — 80053 COMPREHEN METABOLIC PANEL: CPT

## 2022-05-06 PROCEDURE — 84484 ASSAY OF TROPONIN QUANT: CPT

## 2022-05-06 PROCEDURE — 71045 X-RAY EXAM CHEST 1 VIEW: CPT

## 2022-05-06 PROCEDURE — 83690 ASSAY OF LIPASE: CPT

## 2022-05-06 RX ORDER — DEXAMETHASONE 0.5 MG/5ML
6 ELIXIR ORAL ONCE
Refills: 0 | Status: DISCONTINUED | OUTPATIENT
Start: 2022-05-06 | End: 2022-05-06

## 2022-05-06 RX ORDER — DEXAMETHASONE 0.5 MG/5ML
4 ELIXIR ORAL ONCE
Refills: 0 | Status: COMPLETED | OUTPATIENT
Start: 2022-05-06 | End: 2022-05-06

## 2022-05-06 RX ORDER — ONDANSETRON 8 MG/1
4 TABLET, FILM COATED ORAL ONCE
Refills: 0 | Status: DISCONTINUED | OUTPATIENT
Start: 2022-05-06 | End: 2022-05-06

## 2022-05-06 RX ORDER — OXYCODONE AND ACETAMINOPHEN 5; 325 MG/1; MG/1
1 TABLET ORAL ONCE
Refills: 0 | Status: DISCONTINUED | OUTPATIENT
Start: 2022-05-06 | End: 2022-05-06

## 2022-05-06 RX ORDER — MORPHINE SULFATE 50 MG/1
4 CAPSULE, EXTENDED RELEASE ORAL ONCE
Refills: 0 | Status: DISCONTINUED | OUTPATIENT
Start: 2022-05-06 | End: 2022-05-06

## 2022-05-06 RX ORDER — ONDANSETRON 8 MG/1
4 TABLET, FILM COATED ORAL ONCE
Refills: 0 | Status: COMPLETED | OUTPATIENT
Start: 2022-05-06 | End: 2022-05-06

## 2022-05-06 RX ADMIN — OXYCODONE AND ACETAMINOPHEN 1 TABLET(S): 5; 325 TABLET ORAL at 06:27

## 2022-05-06 RX ADMIN — ONDANSETRON 4 MILLIGRAM(S): 8 TABLET, FILM COATED ORAL at 06:28

## 2022-05-06 RX ADMIN — Medication 4 MILLIGRAM(S): at 06:28

## 2022-05-06 RX ADMIN — OXYCODONE AND ACETAMINOPHEN 1 TABLET(S): 5; 325 TABLET ORAL at 08:01

## 2022-05-06 SDOH — ECONOMIC STABILITY - HOUSING INSECURITY: HOMELESSNESS UNSPECIFIED: Z59.00

## 2022-05-06 NOTE — ED PROVIDER NOTE - PROGRESS NOTE DETAILS
CC:  signout received from Dr. Anaya to f/u labs, CXR & CT Lspine: which are within normal limits.  Pt reports unable to get to his pharmacy in [place] & c/o's difficulty managing himself in shelter.  Involving [facility]. CC:  SW consult appreciated.  Pt stable for D/C, cab being arranged to get him to his pharmacy & to follow with DSS for housing assignment.

## 2022-05-06 NOTE — ED PROVIDER NOTE - CLINICAL SUMMARY MEDICAL DECISION MAKING FREE TEXT BOX
Patient with a history of congenital HIV and chronic back pain on tramadol, gabapentin and baclofen presents with worsening low back pain.  No red flags noted to suggest cauda equina syndrome, epidural abscess, or epidural hematoma.  Fortunato Anaya, DO

## 2022-05-06 NOTE — ED ADULT NURSE NOTE - CHIEF COMPLAINT QUOTE
Pt BIBA for low back pain since late afternoon. Denies falls or trauma. Sates chronic low back pain but now pain has increased with radiation to bilateral lower extremities.

## 2022-05-06 NOTE — ED PROVIDER NOTE - PATIENT PORTAL LINK FT
You can access the FollowMyHealth Patient Portal offered by [facility] by registering at the following website: [email or web address]. By joining [employer]’s FollowMyHealth portal, you will also be able to view your health information using other applications (apps) compatible with our system.

## 2022-05-06 NOTE — ED ADULT NURSE NOTE - OBJECTIVE STATEMENT
Pt. presenting to the ER c/o lower back pain. Pt. states he has chronic lower back pain for the last 4 years, follows a doctor on the outside and has been prescribed medications, also complaining of Chest pain that began yesterday evening. Reports "The pain began to get worse yesterday radiating down both legs. I fell weak and having trouble walking." States nothing makes the pain better or worse. Pt. has not taken pain medications in 2 days because "I ran out and my medications are at the pharmacy." Pt. denies numbness or tingling, no bladder or bowel dysfuntion, SOB, N/V/D, or fevers. No medications taken PTA.

## 2022-05-06 NOTE — PATIENT PROFILE ADULT - PUBLIC BENEFITS
-- DO NOT REPLY / DO NOT REPLY ALL --  -- Message is from the [facility]--      Patient is requesting a medication refill - medication is on active list    Was Medication Pended? Yes.    Rx Name and Dose:  metoPROLOL tartrate (LOPRESSOR) 50 MG tablet... 2nd request wicho needs prior authorization.    Duration: 30 days    Pharm[facility]  [id number] - [place], [place] - [facility] [address] [facility]    Patient confirmed the above pharmacy as correct?  Yes    Does this request need an existing or new prescription at a pharmacy to be sent to a new pharmacy location?   No    Caller Information       Type Contact Phone    05/06/2022 05:32 PM CDT Phone (Incoming) Ayala Medel (Self) [phone] (M)          Alternative phone number: [phone]    Turnaround time given to caller:   \"This message will be sent to [state Provider's name]. The clinical team will fulfill your request as soon as they review your message when the office opens on Monday (or after the holiday).\"   no

## 2022-05-06 NOTE — ED ADULT NURSE NOTE - FINAL NURSING ELECTRONIC SIGNATURE
Received report and assumed care. Pt AOx4.  Reports 2/10 dull right foot pain and was given Tylenol per request.   CMS in RLE is intact.  BG this evening was 128.  Plan of care discussed. No other needs at this time.  Bed locked in lowest position. Call light within reach. Fluids infusing.   06-May-2022 12:22

## 2022-05-06 NOTE — ED PROVIDER NOTE - OBJECTIVE STATEMENT
30 yo male with PMH of congenital HIV on BIKTARVY and chronic low back pain on gabapentin 600 mg 3 times daily and baclofen 10 mg 3 times daily presents with worsening low back pain, worsening left lower extremity weakness for the past couple of days.  Patient denies any recent trauma or heavy lifting.  Patient has not had any urinary retention or urinary incontinence.  Patient normally ambulates with a cane and has had increasing difficulty walking over the last couple of days.  Patient is afebrile but complains of congestion and sore throat and cough.  He states he was recently admitted for pancreatitis of unknown etiology and states that he has no history drinking alcohol or using any illicit drugs.

## 2022-05-13 NOTE — PROGRESS NOTE ADULT - PROVIDER SPECIALTY LIST ADULT
Internal Medicine
Neurology
Neurology
Hospitalist
Internal Medicine
163.1

## 2022-05-20 ENCOUNTER — EMERGENCY (EMERGENCY)
Facility: HOSPITAL | Age: 32
LOS: 0 days | Discharge: HOME | End: 2022-05-21
Attending: EMERGENCY MEDICINE
Payer: MEDICAID

## 2022-05-20 VITALS
RESPIRATION RATE: 17 BRPM | TEMPERATURE: 98 F | HEART RATE: 88 BPM | WEIGHT: 210.1 LBS | SYSTOLIC BLOOD PRESSURE: 132 MMHG | OXYGEN SATURATION: 100 % | DIASTOLIC BLOOD PRESSURE: 80 MMHG | HEIGHT: 72 IN

## 2022-05-20 DIAGNOSIS — R42 DIZZINESS AND GIDDINESS: ICD-10-CM

## 2022-05-20 DIAGNOSIS — Z59.02 UNSHELTERED HOMELESSNESS: ICD-10-CM

## 2022-05-20 DIAGNOSIS — Z88.6 ALLERGY STATUS TO ANALGESIC AGENT: ICD-10-CM

## 2022-05-20 DIAGNOSIS — R53.1 WEAKNESS: ICD-10-CM

## 2022-05-20 DIAGNOSIS — Z98.890 OTHER SPECIFIED POSTPROCEDURAL STATES: Chronic | ICD-10-CM

## 2022-05-20 DIAGNOSIS — J45.909 UNSPECIFIED ASTHMA, UNCOMPLICATED: ICD-10-CM

## 2022-05-20 PROCEDURE — 99285 EMERGENCY DEPT VISIT HI MDM: CPT

## 2022-05-20 PROCEDURE — 99285 EMERGENCY DEPT VISIT HI MDM: CPT | Mod: 25

## 2022-05-20 PROCEDURE — 80053 COMPREHEN METABOLIC PANEL: CPT

## 2022-05-20 PROCEDURE — 36415 COLL VENOUS BLD VENIPUNCTURE: CPT

## 2022-05-20 PROCEDURE — 81001 URINALYSIS AUTO W/SCOPE: CPT

## 2022-05-20 PROCEDURE — 72131 CT LUMBAR SPINE W/O DYE: CPT | Mod: 26,MA

## 2022-05-20 PROCEDURE — 72131 CT LUMBAR SPINE W/O DYE: CPT | Mod: MA

## 2022-05-20 PROCEDURE — 93010 ELECTROCARDIOGRAM REPORT: CPT

## 2022-05-20 PROCEDURE — 83735 ASSAY OF MAGNESIUM: CPT

## 2022-05-20 PROCEDURE — 71045 X-RAY EXAM CHEST 1 VIEW: CPT | Mod: 26

## 2022-05-20 PROCEDURE — 70450 CT HEAD/BRAIN W/O DYE: CPT | Mod: 26,MA

## 2022-05-20 PROCEDURE — 70450 CT HEAD/BRAIN W/O DYE: CPT | Mod: MA

## 2022-05-20 PROCEDURE — 93005 ELECTROCARDIOGRAM TRACING: CPT

## 2022-05-20 PROCEDURE — 71045 X-RAY EXAM CHEST 1 VIEW: CPT

## 2022-05-20 PROCEDURE — U0005: CPT

## 2022-05-20 PROCEDURE — U0003: CPT

## 2022-05-20 PROCEDURE — 85025 COMPLETE CBC W/AUTO DIFF WBC: CPT

## 2022-05-20 PROCEDURE — 83690 ASSAY OF LIPASE: CPT

## 2022-05-20 PROCEDURE — 84484 ASSAY OF TROPONIN QUANT: CPT

## 2022-05-20 PROCEDURE — 82550 ASSAY OF CK (CPK): CPT

## 2022-05-20 RX ORDER — LIDOCAINE 4 G/100G
1 CREAM TOPICAL ONCE
Refills: 0 | Status: COMPLETED | OUTPATIENT
Start: 2022-05-20 | End: 2022-05-20

## 2022-05-20 RX ORDER — SODIUM CHLORIDE 9 MG/ML
1000 INJECTION INTRAMUSCULAR; INTRAVENOUS; SUBCUTANEOUS ONCE
Refills: 0 | Status: DISCONTINUED | OUTPATIENT
Start: 2022-05-20 | End: 2022-05-21

## 2022-05-20 RX ORDER — OXYCODONE AND ACETAMINOPHEN 5; 325 MG/1; MG/1
1 TABLET ORAL ONCE
Refills: 0 | Status: DISCONTINUED | OUTPATIENT
Start: 2022-05-20 | End: 2022-05-20

## 2022-05-20 RX ORDER — GABAPENTIN 400 MG/1
300 CAPSULE ORAL ONCE
Refills: 0 | Status: COMPLETED | OUTPATIENT
Start: 2022-05-20 | End: 2022-05-20

## 2022-05-20 SDOH — ECONOMIC STABILITY - HOUSING INSECURITY: UNSHELTERED HOMELESSNESS: Z59.02

## 2022-05-20 NOTE — ED PROVIDER NOTE - PATIENT PORTAL LINK FT
You can access the FollowMyHealth Patient Portal offered by [facility] by registering at the following website: [email or web address]. By joining [employer]’s FollowMyHealth portal, you will also be able to view your health information using other applications (apps) compatible with our system.

## 2022-05-20 NOTE — ED PROVIDER NOTE - PROGRESS NOTE DETAILS
IQRA Marie MD:  Case signed over to Dr. Montague:   []labs incl. U/A,   CT head, L-spine,    [] reassess.  Expect D/c home for PMD f/u this upcoming week. pt told of results.   agrees with plan for d/c home with f/u

## 2022-05-20 NOTE — ED PROVIDER NOTE - CONSTITUTIONAL, MLM
normal... AA male, mildly ill appearing, awake, alert, oriented to person, place, time/situation and in mild distress. Nontoxic.

## 2022-05-20 NOTE — ED ADULT TRIAGE NOTE - CHIEF COMPLAINT QUOTE
Pt brought in by ambulance complaining of weakness and near syncopal episode. Pt complains of low back and leg pain. Pt states that this am when he woke he had a severe headache that has resolved on its own.  Pt with hx of guillien barre.

## 2022-05-20 NOTE — ED PROVIDER NOTE - CLINICAL SUMMARY MEDICAL DECISION MAKING FREE TEXT BOX
33 y/o male w/ a PMHx of guillain barre w/ associated nerve injury (dx 2018) presents to the ED via EMS c/o near syncopal episode and weakness today. Pt also c/o lower back pain and b/l LE pain. Pt endorsing lightheadedness/dizziness. Pt reports this morning when he woke up he had a severe HA that has now resolved on its own. VS stable, afebrile. Decreased b/l SLR specially left due to LBP, +lumbar midline tenderness w/o step-off deformity, no hx of fall, no abd pain. Plan: labs including lipase, magnesium, troponin, CXR, CT head and l-spine, percocet PO, lidocaine patch, observe, and reassess.

## 2022-05-20 NOTE — ED PROVIDER NOTE - NSICDXPASTMEDICALHX_GEN_ALL_CORE_FT
PAST MEDICAL HISTORY:  Asthma     Chronic sinusitis     Closed fracture of multiple ribs of right side, initial encounter     Cocaine abuse     HIV (human immunodeficiency virus infection) from birth    Homeless       [place]

## 2022-05-20 NOTE — ED PROVIDER NOTE - OBJECTIVE STATEMENT
31 y/o male w/ a PMHx of HIV, asthma, and guillain barre w/ associated nerve injury (dx 2018) presents to the ED via EMS c/o near syncopal episode and weakness today. Pt also c/o neck, lower back pain, and b/l LE pain. Pt endorsing lightheadedness/dizziness. Pt reports this morning when he woke up he had a severe HA that has now resolved on its own. Denies changes in appetite. Pt also reports having a recent MRI that showed L4 and L5 degeneration. Pt states recently finishing Amoxacillin for a recent PNA and pancreatitis x few weeks ago. PCP: Dr. Shafer. 31 y/o male w/ a PMHx of HIV, asthma, and guillain barre w/ associated nerve injury (dx 2018) presents to the ED via EMS c/o near syncopal episode and weakness today. Pt also c/o neck, lower back pain, and b/l LE pain. Pt endorsing lightheadedness/dizziness. Pt reports this morning when he woke up he had a severe HA that has now resolved on its own. Denies changes in appetite. Pt also reports having a recent MRI that showed L4 and L5 degeneration. Pt states recently finishing Amoxacillin for a recent PNA and +pancreatitis x few weeks ago. Pt reports recently ran out of his pain meds.  PCP: Dr. Shafer.

## 2022-05-20 NOTE — ED PROVIDER NOTE - NSFOLLOWUPINSTRUCTIONS_ED_ALL_ED_FT
Rest.  Drink plenty of oral [id number].  Follow up this upcoming week with your own doctor.  Follow up with your own Pain Management doctor.        Lightheadedness    WHAT YOU NEED TO KNOW:    Lightheadedness is the feeling that you may faint, but you do not. Your heartbeat may be fast or feel like it flutters. Lightheadedness may occur when you take certain medicines, such as medicine to lower your blood pressure. Dehydration, low sodium, low blood sugar, an abnormal heart rhythm, and anxiety are other common causes.     DISCHARGE INSTRUCTIONS:    Return to the emergency department if:   •You have sudden chest pain.       •You have trouble breathing or shortness of breath.       •You have vision changes, are sweating, and have nausea while you are sitting or lying down.       •You feel flushed and your heart is fluttering.      •You faint.       Contact your healthcare provider if:   •You feel lightheaded often.       •Your heart beats faster or slower than usual.       •You have questions or concerns about your condition or care.      Follow up with your healthcare provider as directed: You may need more tests to help find the cause of your lightheadedness. The tests will help healthcare providers plan the best treatment for you. Write down your questions so you remember to ask them during your visits.     Self-care: Talk with your healthcare provider about these and other ways to manage your symptoms:  •Lie down when you feel lightheaded, your throat gets tight, or your vision changes. Raise your legs above the level of your heart.      •Stand up slowly. Sit on the side of the bed or couch for a few minutes before you stand up.       •Take slow, deep breaths when you feel lightheaded. This can help decrease the feeling that you might faint.       •Ask if you need to avoid hot baths and saunas. These may make your symptoms worse.       Watch for signs of low blood sugar: These include hunger, nervousness, sweating, and fast or fluttery heartbeats. Talk with your healthcare provider about ways to keep your blood sugar level steady.    Check your blood pressure often: You should do this especially if you take medicine to lower your blood pressure. Check your blood pressure when you are lying down and when you are standing. Ask how often to check during the day. Keep a record of your blood pressure numbers. Your healthcare provider may use the record to help plan your treatment.    Keep a record of your lightheadedness episodes: Include your symptoms and your activity before and after the episode. The record can help your healthcare provider find the cause of your lightheadedness and help you manage episodes.

## 2022-05-20 NOTE — ED PROVIDER NOTE - MUSCULOSKELETAL, MLM
Spine appears normal, range of motion is not limited. Neck nontender, supple, no meningismus. + lumbar midline tenderness w/o step-off deformity. Left SLR 10 degree w/ LBP, right SLR 20 degrees w/ LBP. UE's normal. b/l feet w/ + neuropathy.

## 2022-05-21 VITALS
TEMPERATURE: 98 F | SYSTOLIC BLOOD PRESSURE: 147 MMHG | OXYGEN SATURATION: 97 % | RESPIRATION RATE: 16 BRPM | DIASTOLIC BLOOD PRESSURE: 94 MMHG | HEART RATE: 86 BPM

## 2022-05-21 LAB
ALBUMIN SERPL ELPH-MCNC: 3.6 G/DL — SIGNIFICANT CHANGE UP (ref 3.3–5)
ALP SERPL-CCNC: 75 U/L — SIGNIFICANT CHANGE UP (ref 40–120)
ALT FLD-CCNC: 61 U/L — SIGNIFICANT CHANGE UP (ref 12–78)
ANION GAP SERPL CALC-SCNC: 8 MMOL/L — SIGNIFICANT CHANGE UP (ref 5–17)
APPEARANCE UR: CLEAR — SIGNIFICANT CHANGE UP
AST SERPL-CCNC: 64 U/L — HIGH (ref 15–37)
BILIRUB SERPL-MCNC: 0.6 MG/DL — SIGNIFICANT CHANGE UP (ref 0.2–1.2)
BILIRUB UR-MCNC: ABNORMAL
BUN SERPL-MCNC: 9 MG/DL — SIGNIFICANT CHANGE UP (ref 7–23)
CALCIUM SERPL-MCNC: 8.7 MG/DL — SIGNIFICANT CHANGE UP (ref 8.5–10.1)
CHLORIDE SERPL-SCNC: 101 MMOL/L — SIGNIFICANT CHANGE UP (ref 96–108)
CK SERPL-CCNC: 439 U/L — HIGH (ref 26–308)
CO2 SERPL-SCNC: 28 MMOL/L — SIGNIFICANT CHANGE UP (ref 22–31)
COLOR SPEC: YELLOW — SIGNIFICANT CHANGE UP
CREAT SERPL-MCNC: 0.9 MG/DL — SIGNIFICANT CHANGE UP (ref 0.5–1.3)
DIFF PNL FLD: NEGATIVE — SIGNIFICANT CHANGE UP
EGFR: 116 ML/MIN/1.73M2 — SIGNIFICANT CHANGE UP
GLUCOSE SERPL-MCNC: 73 MG/DL — SIGNIFICANT CHANGE UP (ref 70–99)
GLUCOSE UR QL: NEGATIVE — SIGNIFICANT CHANGE UP
KETONES UR-MCNC: ABNORMAL
LEUKOCYTE ESTERASE UR-ACNC: ABNORMAL
LIDOCAIN IGE QN: 143 U/L — SIGNIFICANT CHANGE UP (ref 73–393)
MAGNESIUM SERPL-MCNC: 1.4 MG/DL — LOW (ref 1.6–2.6)
NITRITE UR-MCNC: NEGATIVE — SIGNIFICANT CHANGE UP
PH UR: 6 — SIGNIFICANT CHANGE UP (ref 5–8)
POTASSIUM SERPL-MCNC: 3.3 MMOL/L — LOW (ref 3.5–5.3)
POTASSIUM SERPL-SCNC: 3.3 MMOL/L — LOW (ref 3.5–5.3)
PROT SERPL-MCNC: 7.5 GM/DL — SIGNIFICANT CHANGE UP (ref 6–8.3)
PROT UR-MCNC: 15
SODIUM SERPL-SCNC: 137 MMOL/L — SIGNIFICANT CHANGE UP (ref 135–145)
SP GR SPEC: 1.02 — SIGNIFICANT CHANGE UP (ref 1.01–1.02)
TROPONIN I, HIGH SENSITIVITY RESULT: 5.38 NG/L — SIGNIFICANT CHANGE UP
UROBILINOGEN FLD QL: 4

## 2022-05-21 RX ADMIN — OXYCODONE AND ACETAMINOPHEN 1 TABLET(S): 5; 325 TABLET ORAL at 00:58

## 2022-05-21 RX ADMIN — LIDOCAINE 1 PATCH: 4 CREAM TOPICAL at 00:58

## 2022-05-21 RX ADMIN — GABAPENTIN 300 MILLIGRAM(S): 400 CAPSULE ORAL at 00:57

## 2022-05-21 NOTE — ED ADULT NURSE NOTE - NSICDXPASTMEDICALHX_GEN_ALL_CORE_FT
PAST MEDICAL HISTORY:  Asthma     Chronic sinusitis     Closed fracture of multiple ribs of right side, initial encounter     Cocaine abuse     HIV (human immunodeficiency virus infection) from birth    Homeless       Guillain-[place]

## 2022-05-21 NOTE — ED ADULT NURSE NOTE - OBJECTIVE STATEMENT
Pt brought to ED with complaints of lower medial back and lower extremities. Pt endorses generalized weakness and lightheadedness. Pt is ambulatory. A&O x4. Airway is patent, breathing is even and unlabored. Pt denies difficulty breathing and shortness of breath. Skin is warm and dry. PMS x4 extremities. Pt endorses tingling in his legs. Pt denies headaches. Pt denies chest pain. Pt denies nausea vomiting diarrhea. Pt difficult to obtain IV access. Will ask for assistance. No additional requests or complaints noted. Patient safety maintained. Will continue to monitor.

## 2022-05-21 NOTE — ED ADULT NURSE REASSESSMENT NOTE - NS ED NURSE REASSESS COMMENT FT1
Pt difficult to obtain IV access, 4 RNs attempted 12 times to obtain IV access. As per MD Montague, OK for Pt to not have IV access.

## 2022-05-23 ENCOUNTER — EMERGENCY (EMERGENCY)
Facility: HOSPITAL | Age: 32
LOS: 0 days | Discharge: HOME | End: 2022-05-23
Attending: EMERGENCY MEDICINE
Payer: MEDICAID

## 2022-05-23 VITALS
DIASTOLIC BLOOD PRESSURE: 80 MMHG | OXYGEN SATURATION: 98 % | HEIGHT: 72 IN | WEIGHT: 210.1 LBS | SYSTOLIC BLOOD PRESSURE: 123 MMHG | TEMPERATURE: 99 F | HEART RATE: 85 BPM | RESPIRATION RATE: 18 BRPM

## 2022-05-23 DIAGNOSIS — Z21 ASYMPTOMATIC HUMAN IMMUNODEFICIENCY VIRUS [HIV] INFECTION STATUS: ICD-10-CM

## 2022-05-23 DIAGNOSIS — R42 DIZZINESS AND GIDDINESS: ICD-10-CM

## 2022-05-23 DIAGNOSIS — Z59.00 HOMELESSNESS UNSPECIFIED: ICD-10-CM

## 2022-05-23 DIAGNOSIS — J45.909 UNSPECIFIED ASTHMA, UNCOMPLICATED: ICD-10-CM

## 2022-05-23 DIAGNOSIS — Z98.890 OTHER SPECIFIED POSTPROCEDURAL STATES: Chronic | ICD-10-CM

## 2022-05-23 DIAGNOSIS — Z88.6 ALLERGY STATUS TO ANALGESIC AGENT: ICD-10-CM

## 2022-05-23 DIAGNOSIS — Z88.1 ALLERGY STATUS TO OTHER ANTIBIOTIC AGENTS STATUS: ICD-10-CM

## 2022-05-23 PROCEDURE — 99284 EMERGENCY DEPT VISIT MOD MDM: CPT

## 2022-05-23 PROCEDURE — 99282 EMERGENCY DEPT VISIT SF MDM: CPT

## 2022-05-23 SDOH — ECONOMIC STABILITY - HOUSING INSECURITY: HOMELESSNESS UNSPECIFIED: Z59.00

## 2022-05-23 NOTE — ED STATDOCS - NSICDXPASTMEDICALHX_GEN_ALL_CORE_FT
PAST MEDICAL HISTORY:  Asthma     Chronic sinusitis     Closed fracture of multiple ribs of right side, initial encounter     Cocaine abuse     Guillain-[place]     HIV (human immunodeficiency virus infection) from birth    Homeless

## 2022-05-23 NOTE — ED STATDOCS - PATIENT PORTAL LINK FT
You can access the FollowMyHealth Patient Portal offered by [facility] by registering at the following website: [email or web address]. By joining [employer]s FollowMyHealth portal, you will also be able to view your health information using other applications (apps) compatible with our system.

## 2022-05-23 NOTE — ED STATDOCS - PROGRESS NOTE DETAILS
pt here with no complaints currently. pt aware to fu with pmd and increase fluids and hydration. pt well appearing on dc  and ambulating in the ED. -Esthela Adams PA-C
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
## 2022-05-23 NOTE — ED STATDOCS - ATTENDING CONTRIBUTION TO CARE
I,Oliver Reese MD,  performed the initial face to face bedside interview with this patient regarding history of present illness, review of symptoms and relevant past medical, social and family history.  I completed an independent physical examination.  I was the initial provider who evaluated this patient. I have signed out the follow up of any pending tests (i.e. labs, radiological studies) to the ACP.  I have communicated the patient’s plan of care and disposition with the ACP.  The history, relevant review of systems, past medical and surgical history, medical decision making, and physical examination was documented by the scribe in my presence and I attest to the accuracy of the documentation.

## 2022-05-23 NOTE — ED STATDOCS - NS ED ATTENDING STATEMENT MOD
I have seen and examined this patient and fully participated in the care of this patient as the teaching attending.  The service was shared with the MADDY.  I reviewed and verified the documentation and independently performed the documented:
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
## 2022-05-23 NOTE — ED ADULT NURSE NOTE - AGENT'S NAME
Diana Dang mother Complex Repair And Double M Plasty Text: The defect edges were debeveled with a #15 scalpel blade.  The primary defect was closed partially with a complex linear closure.  Given the location of the remaining defect, shape of the defect and the proximity to free margins a double M plasty was deemed most appropriate for complete closure of the defect.  Using a sterile surgical marker, an appropriate advancement flap was drawn incorporating the defect and placing the expected incisions within the relaxed skin tension lines where possible.    The area thus outlined was incised deep to adipose tissue with a #15 scalpel blade.  The skin margins were undermined to an appropriate distance in all directions utilizing iris scissors.

## 2022-05-23 NOTE — ED ADULT NURSE NOTE - NSICDXPASTMEDICALHX_GEN_ALL_CORE_FT
PAST MEDICAL HISTORY:  Asthma     Chronic sinusitis     Closed fracture of multiple ribs of right side, initial encounter     Cocaine abuse     Guillain-[place]     HIV (human immunodeficiency virus infection) from birth    Homeless

## 2022-06-21 NOTE — PATIENT PROFILE ADULT - FALL HARM RISK
"NEUROSURGERY FOLLOWUP  NOTE    Vinicio Guaman comes today in f/u. Patient is a 62 yo male who is s/p cervical 3-cervical 7 left open door laminoplasty and  thoracic 1-thoracic 2 bilateral laminectomies on 11/12/20.  A little bit ago had really bad back pain. This improved with ibuprofen and 1/2 tab of oxycodone and robaxin. No significant back pain now. Feels like he is still off with his balance. He feels the more he is upright he is better with regards to his balance. Also wondering about strength exercsies to improve his strength.     PHYSICAL EXAM:   Constitutional: BP (!) 155/73   Pulse 69   Ht 5' 5\" (1.651 m)   Wt 193 lb (87.5 kg)   SpO2 97%   BMI 32.12 kg/m       Mental Status: A & O in no acute distress.  Affect is appropriate.  Speech is fluent.  Recent and remote memory are intact.  Attention span and concentration are normal.     Motor:  Normal bulk and tone all muscle groups of upper and lower extremities.     Sensory: Sensation intact bilaterally to light touch.      Coordination:  intact tandem gait      Reflexes; supinator, biceps, triceps, knee/ ankle jerk intact. no hoffmans    IMAGING:   I personally reviewed all radiographic images        CONSULTATION ASSESSMENT AND PLAN:    We discussed a trial of balance therapy and working on core strengthening. If no improvement recommend cervical and thoracic MRI.    I spent more than 20 minutes in this apt, examining the pt, reviewing the scans, reviewing notes from chart, discussing treatment options with risks and benefits and coordinating care.     Dawn Obando MD      CC:     Cynthia Ellsworth  [address]  [place] [address]    " bones(Osteoporosis,prev fx,steroid use,metastatic bone ca)

## 2022-07-07 NOTE — DISCHARGE NOTE PROVIDER - NSCORESITESY/N_GEN_A_CORE_RD
The patient denies any anemia related symptoms and denies any abnormal bruising or bleeding. CPM, await labs and continue avoidance of aspirin and NSAIDs. Further evaluation, treatment, and follow-up per orders, current med list, and patient instructions.     No

## 2022-07-08 ENCOUNTER — EMERGENCY (EMERGENCY)
Facility: HOSPITAL | Age: 32
LOS: 0 days | Discharge: HOME | End: 2022-07-08
Attending: HOSPITALIST
Payer: MEDICAID

## 2022-07-08 VITALS
SYSTOLIC BLOOD PRESSURE: 129 MMHG | OXYGEN SATURATION: 97 % | HEART RATE: 81 BPM | RESPIRATION RATE: 18 BRPM | TEMPERATURE: 99 F | DIASTOLIC BLOOD PRESSURE: 85 MMHG

## 2022-07-08 VITALS
SYSTOLIC BLOOD PRESSURE: 126 MMHG | DIASTOLIC BLOOD PRESSURE: 70 MMHG | RESPIRATION RATE: 18 BRPM | OXYGEN SATURATION: 96 % | HEART RATE: 65 BPM | WEIGHT: 220.02 LBS | TEMPERATURE: 98 F | HEIGHT: 72 IN

## 2022-07-08 DIAGNOSIS — Z98.890 OTHER SPECIFIED POSTPROCEDURAL STATES: Chronic | ICD-10-CM

## 2022-07-08 DIAGNOSIS — Z59.00 HOMELESSNESS UNSPECIFIED: ICD-10-CM

## 2022-07-08 DIAGNOSIS — Z88.6 ALLERGY STATUS TO ANALGESIC AGENT: ICD-10-CM

## 2022-07-08 DIAGNOSIS — B20 HUMAN IMMUNODEFICIENCY VIRUS [HIV] DISEASE: ICD-10-CM

## 2022-07-08 DIAGNOSIS — G89.29 OTHER CHRONIC PAIN: ICD-10-CM

## 2022-07-08 DIAGNOSIS — J45.909 UNSPECIFIED ASTHMA, UNCOMPLICATED: ICD-10-CM

## 2022-07-08 DIAGNOSIS — M54.9 DORSALGIA, UNSPECIFIED: ICD-10-CM

## 2022-07-08 DIAGNOSIS — Z88.1 ALLERGY STATUS TO OTHER ANTIBIOTIC AGENTS STATUS: ICD-10-CM

## 2022-07-08 DIAGNOSIS — F14.19 COCAINE ABUSE WITH UNSPECIFIED COCAINE-INDUCED DISORDER: ICD-10-CM

## 2022-07-08 PROCEDURE — 99284 EMERGENCY DEPT VISIT MOD MDM: CPT

## 2022-07-08 PROCEDURE — 99282 EMERGENCY DEPT VISIT SF MDM: CPT

## 2022-07-08 SDOH — ECONOMIC STABILITY - HOUSING INSECURITY: HOMELESSNESS UNSPECIFIED: Z59.00

## 2022-07-08 NOTE — ED PROVIDER NOTE - NSICDXPASTMEDICALHX_GEN_ALL_CORE_FT
PAST MEDICAL HISTORY:  Asthma     Chronic sinusitis     Closed fracture of multiple ribs of right side, initial encounter     Cocaine abuse     Guillain-[place]     HIV (human immunodeficiency virus infection) from birth    Homeless

## 2022-07-08 NOTE — ED ADULT NURSE NOTE - NSICDXPASTMEDICALHX_GEN_ALL_CORE_FT
PAST MEDICAL HISTORY:  Asthma     Chronic sinusitis     Closed fracture of multiple ribs of right side, initial encounter     Cocaine abuse     Guillain-[place]     HIV (human immunodeficiency virus infection) from birth    Homeless

## 2022-07-08 NOTE — ED ADULT NURSE NOTE - NSIMPLEMENTINTERV_GEN_ALL_ED
Implemented All Universal Safety Interventions:  [place] to call system. Call bell, personal items and telephone within reach. Instruct patient to call for assistance. Room bathroom lighting operational. Non-slip footwear when patient is off stretcher. Physically safe environment: no spills, clutter or unnecessary equipment. Stretcher in lowest position, wheels locked, appropriate side rails in place.

## 2022-07-08 NOTE — CHART NOTE - NSCHARTNOTEFT_GEN_A_CORE
CM met with patient at bedside, role explained. Pt is requesting housing. CM contacted [facility], they stated TLC is full and pt was provided with housing last night at a different facility but chose to show up at [facility]. Pt was sent to [facility] ER. [facility] is requesting that pt go to [address] [place] [address] in [place] and they will arrange housing for pt. He will be sent by [employer] with voucher.

## 2022-07-08 NOTE — ED PROVIDER NOTE - PROGRESS NOTE DETAILS
Fito STEVENS: Patient requesting Lyrica 50 mg states this is the medication he normally takes.  Was called by pharmacy here at [facility] who states they checked I-Stop and patient does not take this medication.  Being that patient is sleeping and appears comfortable will discontinue this medication at this time. patient seen by sw, no complaints, thy will assist with housing issues. pt ambulatory in no distress

## 2022-07-08 NOTE — ED PROVIDER NOTE - OBJECTIVE STATEMENT
32M with hx of HIV on HAART, cocaine abuse, Guillain-[place], homelessness 32M with hx of HIV on HAART, cocaine abuse, Guillain-[place], chronic back pain, homelessness Presents with worsening back pain.  Patient denies any injury or trauma to his back just states his back is hurting him more and he ran out of his medication that he takes for pain.  He states he thinks he takes Lyrica 50 mg a day.  Patient has been compliant with his HIV medication.  Denies any fevers cough or shortness of breath.  Also adds he would like to speak to  for housing assistance.  Falling asleep during exam while talking to me.

## 2022-07-08 NOTE — ED ADULT NURSE REASSESSMENT NOTE - NS ED NURSE REASSESS COMMENT FT1
pt resting comfortably in stretcher at this time. updated on plan of care. awaiting SW consult in AM.

## 2022-07-08 NOTE — ED PROVIDER NOTE - CLINICAL SUMMARY MEDICAL DECISION MAKING FREE TEXT BOX
32-year-old male with homelessness requesting social work evaluation.  Also reports some back pain but appears to be  comfortably sleeping at this time.  Will reassess once awake.
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
## 2022-07-08 NOTE — ED PROVIDER NOTE - PATIENT PORTAL LINK FT
You can access the FollowMyHealth Patient Portal offered by [facility] by registering at the following website: [email or web address]. By joining [employer]’s FollowMyHealth portal, you will also be able to view your health information using other applications (apps) compatible with our system.

## 2022-07-08 NOTE — ED PROVIDER NOTE - MUSCULOSKELETAL, MLM
Spine appears normal, range of motion is not limited,. +ttp over lower back b/l. no midline tenderness.

## 2022-07-08 NOTE — ED PEDIATRIC NURSE REASSESSMENT NOTE - NS ED NURSE REASSESS COMMENT FT2
Pt received from night RN; pt found resting comfortably in stretcher; stretcher in lowest position with call bell in reach.

## 2022-07-08 NOTE — ED ADULT NURSE NOTE - NS_ED_NURSE_TEACHING_TOPIC_ED_A_ED
Follow up with [facility]; pt to be transported via taxi to [facility]./Other specify

## 2022-07-09 ENCOUNTER — EMERGENCY (EMERGENCY)
Facility: HOSPITAL | Age: 32
LOS: 1 days | Discharge: HOME | End: 2022-07-09
Attending: EMERGENCY MEDICINE
Payer: COMMERCIAL

## 2022-07-09 VITALS
DIASTOLIC BLOOD PRESSURE: 69 MMHG | RESPIRATION RATE: 16 BRPM | TEMPERATURE: 99 F | OXYGEN SATURATION: 97 % | SYSTOLIC BLOOD PRESSURE: 120 MMHG | HEART RATE: 97 BPM

## 2022-07-09 VITALS
HEIGHT: 72 IN | RESPIRATION RATE: 16 BRPM | SYSTOLIC BLOOD PRESSURE: 126 MMHG | WEIGHT: 169.98 LBS | OXYGEN SATURATION: 98 % | DIASTOLIC BLOOD PRESSURE: 81 MMHG | HEART RATE: 88 BPM | TEMPERATURE: 98 F

## 2022-07-09 DIAGNOSIS — Z98.890 OTHER SPECIFIED POSTPROCEDURAL STATES: Chronic | ICD-10-CM

## 2022-07-09 LAB
FLUAV AG NPH QL: SIGNIFICANT CHANGE UP
FLUBV AG NPH QL: SIGNIFICANT CHANGE UP
RSV RNA NPH QL NAA+NON-PROBE: SIGNIFICANT CHANGE UP
SARS-COV-2 RNA SPEC QL NAA+PROBE: SIGNIFICANT CHANGE UP

## 2022-07-09 PROCEDURE — 72131 CT LUMBAR SPINE W/O DYE: CPT | Mod: 26,MG

## 2022-07-09 PROCEDURE — 70450 CT HEAD/BRAIN W/O DYE: CPT | Mod: 26,MG

## 2022-07-09 PROCEDURE — 87637 SARSCOV2&INF A&B&RSV AMP PRB: CPT

## 2022-07-09 PROCEDURE — G1004: CPT

## 2022-07-09 PROCEDURE — 90471 IMMUNIZATION ADMIN: CPT

## 2022-07-09 PROCEDURE — 72131 CT LUMBAR SPINE W/O DYE: CPT | Mod: MG

## 2022-07-09 PROCEDURE — 70450 CT HEAD/BRAIN W/O DYE: CPT | Mod: MG

## 2022-07-09 PROCEDURE — 70486 CT MAXILLOFACIAL W/O DYE: CPT | Mod: 26,MG

## 2022-07-09 PROCEDURE — 99284 EMERGENCY DEPT VISIT MOD MDM: CPT | Mod: 25

## 2022-07-09 PROCEDURE — 99285 EMERGENCY DEPT VISIT HI MDM: CPT

## 2022-07-09 PROCEDURE — 90715 TDAP VACCINE 7 YRS/> IM: CPT

## 2022-07-09 PROCEDURE — 70486 CT MAXILLOFACIAL W/O DYE: CPT | Mod: MG

## 2022-07-09 RX ORDER — CYCLOBENZAPRINE HYDROCHLORIDE 10 MG/1
10 TABLET, FILM COATED ORAL ONCE
Refills: 0 | Status: COMPLETED | OUTPATIENT
Start: 2022-07-09 | End: 2022-07-09

## 2022-07-09 RX ORDER — TETANUS TOXOID, REDUCED DIPHTHERIA TOXOID AND ACELLULAR PERTUSSIS VACCINE, ADSORBED 5; 2.5; 8; 8; 2.5 [IU]/.5ML; [IU]/.5ML; UG/.5ML; UG/.5ML; UG/.5ML
0.5 SUSPENSION INTRAMUSCULAR ONCE
Refills: 0 | Status: COMPLETED | OUTPATIENT
Start: 2022-07-09 | End: 2022-07-09

## 2022-07-09 RX ORDER — ACETAMINOPHEN 500 MG
975 TABLET ORAL ONCE
Refills: 0 | Status: COMPLETED | OUTPATIENT
Start: 2022-07-09 | End: 2022-07-09

## 2022-07-09 RX ADMIN — CYCLOBENZAPRINE HYDROCHLORIDE 10 MILLIGRAM(S): 10 TABLET, FILM COATED ORAL at 05:21

## 2022-07-09 RX ADMIN — Medication 975 MILLIGRAM(S): at 05:22

## 2022-07-09 RX ADMIN — TETANUS TOXOID, REDUCED DIPHTHERIA TOXOID AND ACELLULAR PERTUSSIS VACCINE, ADSORBED 0.5 MILLILITER(S): 5; 2.5; 8; 8; 2.5 SUSPENSION INTRAMUSCULAR at 05:12
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
## 2022-07-09 NOTE — ED PROVIDER NOTE - PATIENT PORTAL LINK FT
You can access the FollowMyHealth Patient Portal offered by [facility] by registering at the following website: [email or web address]. By joining [employer]’s FollowMyHealth portal, you will also be able to view your health information using other applications (apps) compatible with our system.

## 2022-07-09 NOTE — ED PROVIDER NOTE - NSICDXPASTMEDICALHX_GEN_ALL_CORE_FT
PAST MEDICAL HISTORY:  Asthma     Chronic sinusitis     Closed fracture of multiple ribs of right side, initial encounter     Cocaine abuse     Guillain-[place]     HIV (human immunodeficiency virus infection) from birth    Homeless
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
## 2022-07-09 NOTE — ED ADULT NURSE REASSESSMENT NOTE - NS ED NURSE REASSESS COMMENT FT1
Pt resting comfortably appearing in stretcher awaiting radiiology exam at this time. Pt offers no complaints.
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
## 2022-07-09 NOTE — ED PROVIDER NOTE - PHYSICAL EXAMINATION
General: Well appearing male in no acute distress  HEENT: Normocephalic, atraumatic. Moist mucous membranes. Oropharynx clear. No lymphadenopathy. +dried blood over lips/ nares. no signs of active bleeding intraorally.  Eyes: No scleral icterus. EOMI. ROGERS.  Neck:. Soft and supple. Full ROM without pain. No midline tenderness  Cardiac: Regular rate and regular rhythm. No murmurs, rubs, gallops. Peripheral pulses 2+ and symmetric. No LE edema.  Resp: Lungs CTAB. Speaking in full sentences. No wheezes, rales or rhonchi.  Abd: Soft, non-tender, non-distended. No guarding or rebound. No scars, masses, or lesions.  Back: +tenderness midline L spine.     Skin: No rashes, abrasions, or lacerations.  Neuro: AO x 3. Moves all extremities symmetrically. Motor strength and sensation grossly intact.

## 2022-07-09 NOTE — ED ADULT NURSE REASSESSMENT NOTE - NS ED NURSE REASSESS COMMENT FT1
Pt awaiting SW to placement due to homelessness and has been provided a breakfast tray at this time. Pt offers no complaints.

## 2022-07-09 NOTE — ED ADULT NURSE NOTE - NSICDXPASTMEDICALHX_GEN_ALL_CORE_FT
PAST MEDICAL HISTORY:  Asthma     Chronic sinusitis     Closed fracture of multiple ribs of right side, initial encounter     Cocaine abuse     Guillain-[place]     HIV (human immunodeficiency virus infection) from birth    Homeless

## 2022-07-09 NOTE — ED ADULT NURSE REASSESSMENT NOTE - NS ED NURSE REASSESS COMMENT FT1
Pt is alert and oriented. Pt denies sob, chest pain, nausea, vomiting, dizziness and pain. Pt resp are even and unlabored, skin color jay for race. Pt updated on plan of care. pt educated on plan of care, pt able to successfully teach back plan of care to RN, RN will continue to reeducate pt during hospital stay.

## 2022-07-16 ENCOUNTER — EMERGENCY (EMERGENCY)
Facility: HOSPITAL | Age: 32
LOS: 0 days | Discharge: HOME | End: 2022-07-16
Attending: STUDENT IN AN ORGANIZED HEALTH CARE EDUCATION/TRAINING PROGRAM
Payer: MEDICAID

## 2022-07-16 VITALS
WEIGHT: 210.1 LBS | DIASTOLIC BLOOD PRESSURE: 76 MMHG | HEART RATE: 79 BPM | RESPIRATION RATE: 18 BRPM | TEMPERATURE: 99 F | SYSTOLIC BLOOD PRESSURE: 125 MMHG | HEIGHT: 71 IN | OXYGEN SATURATION: 99 %

## 2022-07-16 DIAGNOSIS — J45.909 UNSPECIFIED ASTHMA, UNCOMPLICATED: ICD-10-CM

## 2022-07-16 DIAGNOSIS — F14.19 COCAINE ABUSE WITH UNSPECIFIED COCAINE-INDUCED DISORDER: ICD-10-CM

## 2022-07-16 DIAGNOSIS — Z59.00 HOMELESSNESS UNSPECIFIED: ICD-10-CM

## 2022-07-16 DIAGNOSIS — Z88.1 ALLERGY STATUS TO OTHER ANTIBIOTIC AGENTS STATUS: ICD-10-CM

## 2022-07-16 DIAGNOSIS — Z88.6 ALLERGY STATUS TO ANALGESIC AGENT: ICD-10-CM

## 2022-07-16 DIAGNOSIS — Z98.890 OTHER SPECIFIED POSTPROCEDURAL STATES: Chronic | ICD-10-CM

## 2022-07-16 DIAGNOSIS — R42 DIZZINESS AND GIDDINESS: ICD-10-CM

## 2022-07-16 DIAGNOSIS — R11.0 NAUSEA: ICD-10-CM

## 2022-07-16 DIAGNOSIS — B20 HUMAN IMMUNODEFICIENCY VIRUS [HIV] DISEASE: ICD-10-CM

## 2022-07-16 PROCEDURE — 99282 EMERGENCY DEPT VISIT SF MDM: CPT

## 2022-07-16 PROCEDURE — 99284 EMERGENCY DEPT VISIT MOD MDM: CPT

## 2022-07-16 SDOH — ECONOMIC STABILITY - HOUSING INSECURITY: HOMELESSNESS UNSPECIFIED: Z59.00

## 2022-07-16 NOTE — ED STATDOCS - NSICDXPASTMEDICALHX_GEN_ALL_CORE_FT
PAST MEDICAL HISTORY:  Asthma     Chronic sinusitis     Closed fracture of multiple ribs of right side, initial encounter     Cocaine abuse     Guillain-[place]     HIV (human immunodeficiency virus infection) from birth    Homeless

## 2022-07-16 NOTE — ED STATDOCS - OBJECTIVE STATEMENT
33 y/o male with PMHx of asthma, cocaine abuse, Guillain-[place], HIV, presents to the ED c/o dizziness described as feeling unsteady on his feet and nausea since today. Pt recently switched his diet. Pt states that sx have self resolved. Denies cp, sob, syncope, diaphoresis, fever.

## 2022-07-16 NOTE — ED STATDOCS - CLINICAL SUMMARY MEDICAL DECISION MAKING FREE TEXT BOX
33 y/o male presents with dizziness described as feeling unsteady on his feet, which self resolved in minutes. Pt requesting to be discharged because he feels fine. VS normal. Exam neuro intact. I offered screening blood work and IV fluids, pt refused. Will discharge pt requesting ride home.

## 2022-07-16 NOTE — ED STATDOCS - PROGRESS NOTE DETAILS
I offered medical evaluation for patient for dizziness. Patient states he feels fine and wants to be dischraged. An extensive discussion was had with the patient regarding labs/imaging and tests prior to discharge. We discussed in depth the importance of follow up for continuing medical care as an outpatient. The patient was advised to return the Emergency Department for worsening or persistent symptoms, and/or any new or concerning symptoms.

## 2022-07-16 NOTE — ED STATDOCS - PATIENT PORTAL LINK FT
You can access the FollowMyHealth Patient Portal offered by [facility] by registering at the following website: [email or web address]. By joining [employer]’s FollowMyHealth portal, you will also be able to view your health information using other applications (apps) compatible with our system.

## 2022-07-16 NOTE — ED STATDOCS - NSFOLLOWUPINSTRUCTIONS_ED_ALL_ED_FT
Dizziness      Dizziness is a common problem. It makes you feel unsteady or light-headed. You may feel like you are about to pass out (faint). Dizziness can lead to getting hurt if you stumble or fall. Dizziness can be caused by many things, including:  •Medicines.      •Not having enough water in your body (dehydration).      •Illness.        Follow these instructions at home:      Eating and drinking   A comparison of three sample cups showing dark yellow, yellow, and pale yellow urine.   •Drink enough fluid to keep your pee (urine) pale yellow. This helps to keep you from getting dehydrated. Try to drink more clear fluids, such as water.      • Do not drink alcohol.      •Limit how much caffeine you drink or eat, if your doctor tells you to do that.      •Limit how much salt (sodium) you drink or eat, if your doctor tells you to do that.        Activity   A sign showing that a person should not drive. •Avoid making quick movements.  •Stand up slowly from sitting in a chair, and steady yourself until you feel okay.      •In the morning, first sit up on the side of the bed. When you feel okay, stand up slowly while you hold onto something. Do this until you know that your balance is okay.        •If you need to  one place for a long time, move your legs often. Tighten and relax the muscles in your legs while you are standing.      • Do not drive or use machinery if you feel dizzy.      •Avoid bending down if you feel dizzy. Place items in your home so you can reach them easily without leaning over.      Lifestyle     • Do not smoke or use any products that contain nicotine or tobacco. If you need help quitting, ask your doctor.      •Try to lower your stress level. You can do this by using methods such as yoga or meditation. Talk with your doctor if you need help.      General instructions     •Watch your dizziness for any changes.      •Take over-the-counter and prescription medicines only as told by your doctor. Talk with your doctor if you think that you are dizzy because of a medicine that you are taking.      •Tell a friend or a family member that you are feeling dizzy. If he or she notices any changes in your behavior, have this person call your doctor.      •Keep all follow-up visits.        Contact a doctor if:    •Your dizziness does not go away.      •Your dizziness or light-headedness gets worse.      •You feel like you may vomit (are nauseous).      •You have trouble hearing.      •You have new symptoms.      •You are unsteady on your feet.      •You feel like the room is spinning.      •You have neck pain or a stiff neck.      •You have a fever.        Get help right away if:    •You vomit or have watery poop (diarrhea), and you cannot eat or drink anything.    •You have trouble:  •Talking.      •Walking.      •Swallowing.      •Using your arms, hands, or legs.        •You feel generally weak.      •You are not thinking clearly, or you have trouble forming sentences. A friend or family member may notice this.    •You have:  •Chest pain.      •Pain in your belly (abdomen).      •Shortness of breath.      •Sweating.        •Your vision changes.      •You are bleeding.      •You have a very bad headache.      These symptoms may be an emergency. Get help right away. Call your local emergency services (911 in the [place]   • Do not wait to see if the symptoms will go away.        • Do not drive yourself to the hospital.         Summary    •Dizziness makes you feel unsteady or light-headed. You may feel like you are about to pass out (faint).      •Drink enough fluid to keep your pee (urine) pale yellow. Do not drink alcohol.      •Avoid making quick movements if you feel dizzy.      •Watch your dizziness for any changes.      This information is not intended to replace advice given to you by your health care provider. Make sure you discuss any questions you have with your health care provider.

## 2022-08-09 ENCOUNTER — INPATIENT (INPATIENT)
Facility: HOSPITAL | Age: 32
LOS: 6 days | Discharge: HOME | End: 2022-08-16
Attending: HOSPITALIST | Admitting: HOSPITALIST

## 2022-08-09 VITALS
RESPIRATION RATE: 15 BRPM | HEART RATE: 66 BPM | DIASTOLIC BLOOD PRESSURE: 65 MMHG | OXYGEN SATURATION: 100 % | SYSTOLIC BLOOD PRESSURE: 114 MMHG | HEIGHT: 71 IN | TEMPERATURE: 98 F

## 2022-08-09 DIAGNOSIS — Z98.890 OTHER SPECIFIED POSTPROCEDURAL STATES: Chronic | ICD-10-CM

## 2022-08-09 DIAGNOSIS — Z29.9 ENCOUNTER FOR PROPHYLACTIC MEASURES, UNSPECIFIED: ICD-10-CM

## 2022-08-09 DIAGNOSIS — B20 HUMAN IMMUNODEFICIENCY VIRUS [HIV] DISEASE: ICD-10-CM

## 2022-08-09 DIAGNOSIS — F51.4 SLEEP TERRORS [NIGHT TERRORS]: ICD-10-CM

## 2022-08-09 DIAGNOSIS — R29.898 OTHER SYMPTOMS AND SIGNS INVOLVING THE MUSCULOSKELETAL SYSTEM: ICD-10-CM

## 2022-08-09 LAB
A1C WITH ESTIMATED AVERAGE GLUCOSE RESULT: 4.6 % — SIGNIFICANT CHANGE UP (ref 4–5.6)
ALBUMIN SERPL ELPH-MCNC: 3.9 G/DL — SIGNIFICANT CHANGE UP (ref 3.3–5)
ALP SERPL-CCNC: 70 U/L — SIGNIFICANT CHANGE UP (ref 40–120)
ALT FLD-CCNC: 24 U/L — SIGNIFICANT CHANGE UP (ref 4–41)
ANION GAP SERPL CALC-SCNC: 10 MMOL/L — SIGNIFICANT CHANGE UP (ref 7–14)
APPEARANCE UR: CLEAR — SIGNIFICANT CHANGE UP
AST SERPL-CCNC: 47 U/L — HIGH (ref 4–40)
B PERT DNA SPEC QL NAA+PROBE: SIGNIFICANT CHANGE UP
B PERT+PARAPERT DNA PNL SPEC NAA+PROBE: SIGNIFICANT CHANGE UP
BACTERIA # UR AUTO: NEGATIVE — SIGNIFICANT CHANGE UP
BASOPHILS # BLD AUTO: 0 K/UL — SIGNIFICANT CHANGE UP (ref 0–0.2)
BASOPHILS NFR BLD AUTO: 0 % — SIGNIFICANT CHANGE UP (ref 0–2)
BILIRUB SERPL-MCNC: 0.5 MG/DL — SIGNIFICANT CHANGE UP (ref 0.2–1.2)
BILIRUB UR-MCNC: ABNORMAL
BORDETELLA PARAPERTUSSIS (RAPRVP): SIGNIFICANT CHANGE UP
BUN SERPL-MCNC: 10 MG/DL — SIGNIFICANT CHANGE UP (ref 7–23)
C PNEUM DNA SPEC QL NAA+PROBE: SIGNIFICANT CHANGE UP
CALCIUM SERPL-MCNC: 8.8 MG/DL — SIGNIFICANT CHANGE UP (ref 8.4–10.5)
CHLORIDE SERPL-SCNC: 103 MMOL/L — SIGNIFICANT CHANGE UP (ref 98–107)
CO2 SERPL-SCNC: 23 MMOL/L — SIGNIFICANT CHANGE UP (ref 22–31)
COLOR SPEC: YELLOW — SIGNIFICANT CHANGE UP
CREAT SERPL-MCNC: 0.68 MG/DL — SIGNIFICANT CHANGE UP (ref 0.5–1.3)
CRP SERPL-MCNC: <3 MG/L — SIGNIFICANT CHANGE UP
DIFF PNL FLD: NEGATIVE — SIGNIFICANT CHANGE UP
EGFR: 126 ML/MIN/1.73M2 — SIGNIFICANT CHANGE UP
EOSINOPHIL # BLD AUTO: 0 K/UL — SIGNIFICANT CHANGE UP (ref 0–0.5)
EOSINOPHIL NFR BLD AUTO: 0 % — SIGNIFICANT CHANGE UP (ref 0–6)
EPI CELLS # UR: 2 /HPF — SIGNIFICANT CHANGE UP (ref 0–5)
ESTIMATED AVERAGE GLUCOSE: 85 — SIGNIFICANT CHANGE UP
FLUAV SUBTYP SPEC NAA+PROBE: SIGNIFICANT CHANGE UP
FLUBV RNA SPEC QL NAA+PROBE: SIGNIFICANT CHANGE UP
FOLATE SERPL-MCNC: 8.1 NG/ML — SIGNIFICANT CHANGE UP (ref 3.1–17.5)
GLUCOSE SERPL-MCNC: 94 MG/DL — SIGNIFICANT CHANGE UP (ref 70–99)
GLUCOSE UR QL: NEGATIVE — SIGNIFICANT CHANGE UP
HADV DNA SPEC QL NAA+PROBE: SIGNIFICANT CHANGE UP
HCOV 229E RNA SPEC QL NAA+PROBE: SIGNIFICANT CHANGE UP
HCOV HKU1 RNA SPEC QL NAA+PROBE: SIGNIFICANT CHANGE UP
HCOV NL63 RNA SPEC QL NAA+PROBE: SIGNIFICANT CHANGE UP
HCOV OC43 RNA SPEC QL NAA+PROBE: SIGNIFICANT CHANGE UP
HCT VFR BLD CALC: 36.8 % — LOW (ref 39–50)
HGB BLD-MCNC: 12.3 G/DL — LOW (ref 13–17)
HMPV RNA SPEC QL NAA+PROBE: SIGNIFICANT CHANGE UP
HPIV1 RNA SPEC QL NAA+PROBE: SIGNIFICANT CHANGE UP
HPIV2 RNA SPEC QL NAA+PROBE: SIGNIFICANT CHANGE UP
HPIV3 RNA SPEC QL NAA+PROBE: SIGNIFICANT CHANGE UP
HPIV4 RNA SPEC QL NAA+PROBE: SIGNIFICANT CHANGE UP
HYALINE CASTS # UR AUTO: SIGNIFICANT CHANGE UP /LPF (ref 0–7)
IANC: 0.37 K/UL — LOW (ref 1.8–7.4)
KETONES UR-MCNC: ABNORMAL
LEUKOCYTE ESTERASE UR-ACNC: NEGATIVE — SIGNIFICANT CHANGE UP
LYMPHOCYTES # BLD AUTO: 1.37 K/UL — SIGNIFICANT CHANGE UP (ref 1–3.3)
LYMPHOCYTES # BLD AUTO: 50.5 % — HIGH (ref 13–44)
M PNEUMO DNA SPEC QL NAA+PROBE: SIGNIFICANT CHANGE UP
MAGNESIUM SERPL-MCNC: 1.4 MG/DL — LOW (ref 1.6–2.6)
MCHC RBC-ENTMCNC: 29.4 PG — SIGNIFICANT CHANGE UP (ref 27–34)
MCHC RBC-ENTMCNC: 33.4 GM/DL — SIGNIFICANT CHANGE UP (ref 32–36)
MCV RBC AUTO: 87.8 FL — SIGNIFICANT CHANGE UP (ref 80–100)
MONOCYTES # BLD AUTO: 0.39 K/UL — SIGNIFICANT CHANGE UP (ref 0–0.9)
MONOCYTES NFR BLD AUTO: 14.4 % — HIGH (ref 2–14)
NEUTROPHILS # BLD AUTO: 0.46 K/UL — LOW (ref 1.8–7.4)
NEUTROPHILS NFR BLD AUTO: 17.1 % — LOW (ref 43–77)
NITRITE UR-MCNC: NEGATIVE — SIGNIFICANT CHANGE UP
PH UR: 6.5 — SIGNIFICANT CHANGE UP (ref 5–8)
PLATELET # BLD AUTO: 175 K/UL — SIGNIFICANT CHANGE UP (ref 150–400)
POTASSIUM SERPL-MCNC: 4.1 MMOL/L — SIGNIFICANT CHANGE UP (ref 3.5–5.3)
POTASSIUM SERPL-SCNC: 4.1 MMOL/L — SIGNIFICANT CHANGE UP (ref 3.5–5.3)
PROT SERPL-MCNC: 7.8 G/DL — SIGNIFICANT CHANGE UP (ref 6–8.3)
PROT UR-MCNC: ABNORMAL
RAPID RVP RESULT: SIGNIFICANT CHANGE UP
RBC # BLD: 4.19 M/UL — LOW (ref 4.2–5.8)
RBC # FLD: 13.4 % — SIGNIFICANT CHANGE UP (ref 10.3–14.5)
RBC CASTS # UR COMP ASSIST: 2 /HPF — SIGNIFICANT CHANGE UP (ref 0–4)
RSV RNA SPEC QL NAA+PROBE: SIGNIFICANT CHANGE UP
RV+EV RNA SPEC QL NAA+PROBE: SIGNIFICANT CHANGE UP
SARS-COV-2 RNA SPEC QL NAA+PROBE: SIGNIFICANT CHANGE UP
SODIUM SERPL-SCNC: 136 MMOL/L — SIGNIFICANT CHANGE UP (ref 135–145)
SP GR SPEC: 1.03 — SIGNIFICANT CHANGE UP (ref 1–1.05)
TOXICOLOGY SCREEN, DRUGS OF ABUSE, SERUM RESULT: SIGNIFICANT CHANGE UP
TSH SERPL-MCNC: 1.16 UIU/ML — SIGNIFICANT CHANGE UP (ref 0.27–4.2)
UROBILINOGEN FLD QL: ABNORMAL
VIT B12 SERPL-MCNC: 238 PG/ML — SIGNIFICANT CHANGE UP (ref 200–900)
WBC # BLD: 2.71 K/UL — LOW (ref 3.8–10.5)
WBC # FLD AUTO: 2.71 K/UL — LOW (ref 3.8–10.5)
WBC UR QL: 4 /HPF — SIGNIFICANT CHANGE UP (ref 0–5)

## 2022-08-09 PROCEDURE — 99233 SBSQ HOSP IP/OBS HIGH 50: CPT | Mod: GC

## 2022-08-09 PROCEDURE — 70450 CT HEAD/BRAIN W/O DYE: CPT | Mod: 26,MA

## 2022-08-09 PROCEDURE — 71045 X-RAY EXAM CHEST 1 VIEW: CPT | Mod: 26

## 2022-08-09 PROCEDURE — 72132 CT LUMBAR SPINE W/DYE: CPT | Mod: 26,MA

## 2022-08-09 PROCEDURE — 72129 CT CHEST SPINE W/DYE: CPT | Mod: 26,MA

## 2022-08-09 PROCEDURE — 99285 EMERGENCY DEPT VISIT HI MDM: CPT

## 2022-08-09 RX ORDER — BACLOFEN 100 %
5 POWDER (GRAM) MISCELLANEOUS EVERY 8 HOURS
Refills: 0 | Status: DISCONTINUED | OUTPATIENT
Start: 2022-08-09 | End: 2022-08-10

## 2022-08-09 RX ORDER — QUETIAPINE FUMARATE 200 MG/1
100 TABLET, FILM COATED ORAL ONCE
Refills: 0 | Status: DISCONTINUED | OUTPATIENT
Start: 2022-08-09 | End: 2022-08-16

## 2022-08-09 RX ORDER — ONDANSETRON 8 MG/1
4 TABLET, FILM COATED ORAL EVERY 8 HOURS
Refills: 0 | Status: DISCONTINUED | OUTPATIENT
Start: 2022-08-09 | End: 2022-08-16

## 2022-08-09 RX ORDER — LANOLIN ALCOHOL/MO/W.PET/CERES
6 CREAM (GRAM) TOPICAL AT BEDTIME
Refills: 0 | Status: DISCONTINUED | OUTPATIENT
Start: 2022-08-09 | End: 2022-08-16

## 2022-08-09 RX ORDER — KETOROLAC TROMETHAMINE 30 MG/ML
15 SYRINGE (ML) INJECTION EVERY 8 HOURS
Refills: 0 | Status: DISCONTINUED | OUTPATIENT
Start: 2022-08-09 | End: 2022-08-09

## 2022-08-09 RX ORDER — BICTEGRAVIR SODIUM, EMTRICITABINE, AND TENOFOVIR ALAFENAMIDE FUMARATE 30; 120; 15 MG/1; MG/1; MG/1
1 TABLET ORAL ONCE
Refills: 0 | Status: COMPLETED | OUTPATIENT
Start: 2022-08-09 | End: 2022-08-09

## 2022-08-09 RX ORDER — ACETAMINOPHEN 500 MG
650 TABLET ORAL EVERY 6 HOURS
Refills: 0 | Status: DISCONTINUED | OUTPATIENT
Start: 2022-08-09 | End: 2022-08-16

## 2022-08-09 RX ORDER — GABAPENTIN 400 MG/1
600 CAPSULE ORAL THREE TIMES A DAY
Refills: 0 | Status: DISCONTINUED | OUTPATIENT
Start: 2022-08-09 | End: 2022-08-10

## 2022-08-09 RX ORDER — LANOLIN ALCOHOL/MO/W.PET/CERES
3 CREAM (GRAM) TOPICAL AT BEDTIME
Refills: 0 | Status: DISCONTINUED | OUTPATIENT
Start: 2022-08-09 | End: 2022-08-16

## 2022-08-09 RX ORDER — ACETAMINOPHEN 500 MG
1000 TABLET ORAL ONCE
Refills: 0 | Status: COMPLETED | OUTPATIENT
Start: 2022-08-09 | End: 2022-08-09

## 2022-08-09 RX ORDER — BACLOFEN 100 %
5 POWDER (GRAM) MISCELLANEOUS ONCE
Refills: 0 | Status: COMPLETED | OUTPATIENT
Start: 2022-08-09 | End: 2022-08-09

## 2022-08-09 RX ORDER — BICTEGRAVIR SODIUM, EMTRICITABINE, AND TENOFOVIR ALAFENAMIDE FUMARATE 30; 120; 15 MG/1; MG/1; MG/1
1 TABLET ORAL DAILY
Refills: 0 | Status: DISCONTINUED | OUTPATIENT
Start: 2022-08-09 | End: 2022-08-16

## 2022-08-09 RX ORDER — QUETIAPINE FUMARATE 200 MG/1
200 TABLET, FILM COATED ORAL AT BEDTIME
Refills: 0 | Status: DISCONTINUED | OUTPATIENT
Start: 2022-08-09 | End: 2022-08-16

## 2022-08-09 RX ORDER — QUETIAPINE FUMARATE 200 MG/1
100 TABLET, FILM COATED ORAL ONCE
Refills: 0 | Status: COMPLETED | OUTPATIENT
Start: 2022-08-09 | End: 2022-08-09

## 2022-08-09 RX ORDER — GABAPENTIN 400 MG/1
300 CAPSULE ORAL ONCE
Refills: 0 | Status: COMPLETED | OUTPATIENT
Start: 2022-08-09 | End: 2022-08-09

## 2022-08-09 RX ORDER — ENOXAPARIN SODIUM 100 MG/ML
40 INJECTION SUBCUTANEOUS EVERY 24 HOURS
Refills: 0 | Status: DISCONTINUED | OUTPATIENT
Start: 2022-08-09 | End: 2022-08-16

## 2022-08-09 RX ORDER — DIAZEPAM 5 MG
5 TABLET ORAL ONCE
Refills: 0 | Status: DISCONTINUED | OUTPATIENT
Start: 2022-08-09 | End: 2022-08-09

## 2022-08-09 RX ADMIN — BICTEGRAVIR SODIUM, EMTRICITABINE, AND TENOFOVIR ALAFENAMIDE FUMARATE 1 TABLET(S): 30; 120; 15 TABLET ORAL at 12:12

## 2022-08-09 RX ADMIN — Medication 50 MILLIGRAM(S): at 12:14

## 2022-08-09 RX ADMIN — QUETIAPINE FUMARATE 100 MILLIGRAM(S): 200 TABLET, FILM COATED ORAL at 12:15

## 2022-08-09 RX ADMIN — Medication 5 MILLIGRAM(S): at 04:09

## 2022-08-09 RX ADMIN — GABAPENTIN 300 MILLIGRAM(S): 400 CAPSULE ORAL at 12:13

## 2022-08-09 RX ADMIN — GABAPENTIN 600 MILLIGRAM(S): 400 CAPSULE ORAL at 23:49

## 2022-08-09 RX ADMIN — Medication 400 MILLIGRAM(S): at 03:39

## 2022-08-09 RX ADMIN — Medication 5 MILLIGRAM(S): at 12:17

## 2022-08-09 RX ADMIN — Medication 5 MILLIGRAM(S): at 12:15

## 2022-08-09 RX ADMIN — Medication 5 MILLIGRAM(S): at 23:48

## 2022-08-09 NOTE — ED ADULT NURSE NOTE - NSIMPLEMENTINTERV_GEN_ALL_ED
Implemented All Fall Risk Interventions:  [place] to call system. Call bell, personal items and telephone within reach. Instruct patient to call for assistance. Room bathroom lighting operational. Non-slip footwear when patient is off stretcher. Physically safe environment: no spills, clutter or unnecessary equipment. Stretcher in lowest position, wheels locked, appropriate side rails in place. Provide visual cue, wrist band, yellow gown, etc. Monitor gait and stability. Monitor for mental status changes and reorient to person, place, and time. Review medications for side effects contributing to fall risk. Reinforce activity limits and safety measures with patient and family.

## 2022-08-09 NOTE — CONSULT NOTE ADULT - SUBJECTIVE AND OBJECTIVE BOX
HPI: 33 y/o M hx of congenital HIV, GBS, chronic back pain presenting for b/l LE weakness for which neurology was consulted. Duration 3 days.       REVIEW OF SYSTEMS  Per HPI    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      Asthma      Closed fracture of multiple ribs of right side, initial encounter      Cocaine abuse      Chronic sinusitis      Homeless      Guillain-[place]      History of orthopedic surgery  left arm        FAMILY HISTORY:  FH: HIV infection (Mother)      SOCIAL HISTORY:   T/E/D:   Occupation:   Lives with:     MEDICATIONS (HOME):  Home Medications:  baclofen 10 mg oral tablet: 1 tab(s) orally 3 times a day (01 Mar 2022 00:38)  gabapentin 300 mg oral capsule: 2 cap(s) orally 3 times a day (01 Mar 2022 00:38)  magnesium oxide 400 mg oral tablet: 1 tab(s) orally once a day (01 Mar 2022 00:38)  melatonin 5 mg oral tablet: 1 tab(s) orally once a day (at bedtime) (01 Mar 2022 00:38)  oseltamivir 75 mg oral capsule: 1 cap(s) orally 2 times a day until 3/6. (04 Mar 2022 13:43)  SEROquel 100 mg oral tablet: 100 mG in the morning and 200 mG in the evening. x (04 Mar 2022 13:43)    MEDICATIONS  (STANDING):  acetaminophen   IVPB .. 1000 milliGRAM(s) IV Intermittent once    MEDICATIONS  (PRN):    ALLERGIES/INTOLERANCES:  Allergies  Ceclor (Unknown)  Toradol (Anaphylaxis; Swelling)  Toradol (Swelling)    Intolerances    VITALS & EXAMINATION:  Vital Signs Last 24 Hrs  T(C): 36.8 (09 Aug 2022 00:53), Max: 36.8 (09 Aug 2022 00:53)  T(F): 98.2 (09 Aug 2022 00:53), Max: 98.2 (09 Aug 2022 00:53)  HR: 66 (09 Aug 2022 00:53) (66 - 66)  BP: 114/65 (09 Aug 2022 00:53) (114/65 - 114/65)  BP(mean): --  RR: 15 (09 Aug 2022 00:53) (15 - 15)  SpO2: 100% (09 Aug 2022 00:53) (100% - 100%)    Parameters below as of 09 Aug 2022 00:53  Patient On (Oxygen Delivery Method): room air        General:  INCOMPLETE (TO BE UPDATED)  Constitutional: Male, appears stated age, in no apparent distress including pain  Head: Normocephalic & atraumatic.    Neurological (>12):  MS: Awake, alert, oriented to person, place, situation, time. Normal affect. Follows all commands.    Language: Speech is clear, fluent with good repetition & comprehension (able to name objects___)    CNs: PERRLA (R = 3mm, L = 3mm). VFF. EOMI no nystagmus, no diplopia. V1-3 intact to LT/pinprick, well developed masseter muscles b/l. No facial asymmetry b/l, full eye closure strength b/l. Hearing grossly normal (rubbing fingers) b/l. Symmetric palate elevation in midline. Gag reflex deferred. Head turning & shoulder shrug intact b/l. Tongue midline, normal movements, no atrophy.    Motor: Normal muscle bulk & tone. No noticeable tremor or seizure. No pronator drift.              Deltoid	Biceps	Triceps	Wrist	Finger ABd	   R	5	5	5	5	5		5 	  L	5	5	5	5	5		5    	H-Flex	H-Ext	H-ABd	H-ADd	K-Flex	K-Ext	D-Flex	P-Flex  R	5	5	5	5	5	5	5	5 	   L	5	5	5	5	5	5	5	5	     Sensation: Intact to LT/PP/Temp/Vibration/Position b/l throughout.     Cortical: Extinction on DSS (neglect): none    Reflexes:              Biceps(C5)       BR(C6)     Triceps(C7)               Patellar(L4)    Achilles(S1)    Plantar Resp  R	2	          2	             2		        2		    2		Down   L	2	          2	             2		        2		    2		Down     Coordination: intact rapid-alt movements. No dysmetria to FTN/HTS    Gait: Normal Romberg. No postural instability. Normal stance and tandem gait.     LABORATORY:  CBC   Chem       LFTs   Coagulopathy   Lipid Panel   A1c   Cardiac enzymes     U/A   CSF  Immunological  Other    STUDIES & IMAGING:  Studies (EKG, EEG, EMG, etc):     Radiology (XR, CT, MR, U/S, TTE/GARFIELD): HPI: 33 y/o M hx of congenital HIV, GBS, chronic back pain, presenting for b/l LE weakness for which neurology was consulted. Duration 3 days. States weakness started in toes and slowly progressed to the hips, followed by numbness in the toes and progressively spreading to the hips. Endorsing also 2-3 days of bowel and urinary incontinence w/ pain/tenderness in the lower back region. Patient usually has muscles spasms in the lumbar region at baseline, however now worse than usual. Denies saddle anesthesia. Was diagnosed w/ GBS at [facility], according to patient. Does not follow with a neurologist. Takes Baclofen, Gabapentin, and Lyrica for b/l LE pain and muscle spasms. Denies any recent trauma or infections. Lives in group home. Denies numbness/tingling/weakness in the UE's.       REVIEW OF SYSTEMS  Per HPI    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      Asthma      Closed fracture of multiple ribs of right side, initial encounter      Cocaine abuse      Chronic sinusitis      Homeless      Guillain-[place]      History of orthopedic surgery  left arm        FAMILY HISTORY:  FH: HIV infection (Mother)      SOCIAL HISTORY:   T/E/D:   Occupation:   Lives with:     MEDICATIONS (HOME):  Home Medications:  baclofen 10 mg oral tablet: 1 tab(s) orally 3 times a day (01 Mar 2022 00:38)  gabapentin 300 mg oral capsule: 2 cap(s) orally 3 times a day (01 Mar 2022 00:38)  magnesium oxide 400 mg oral tablet: 1 tab(s) orally once a day (01 Mar 2022 00:38)  melatonin 5 mg oral tablet: 1 tab(s) orally once a day (at bedtime) (01 Mar 2022 00:38)  oseltamivir 75 mg oral capsule: 1 cap(s) orally 2 times a day until 3/6. (04 Mar 2022 13:43)  SEROquel 100 mg oral tablet: 100 mG in the morning and 200 mG in the evening. x (04 Mar 2022 13:43)    MEDICATIONS  (STANDING):  acetaminophen   IVPB .. 1000 milliGRAM(s) IV Intermittent once    MEDICATIONS  (PRN):    ALLERGIES/INTOLERANCES:  Allergies  Ceclor (Unknown)  Toradol (Anaphylaxis; Swelling)  Toradol (Swelling)    Intolerances    VITALS & EXAMINATION:  Vital Signs Last 24 Hrs  T(C): 36.8 (09 Aug 2022 00:53), Max: 36.8 (09 Aug 2022 00:53)  T(F): 98.2 (09 Aug 2022 00:53), Max: 98.2 (09 Aug 2022 00:53)  HR: 66 (09 Aug 2022 00:53) (66 - 66)  BP: 114/65 (09 Aug 2022 00:53) (114/65 - 114/65)  BP(mean): --  RR: 15 (09 Aug 2022 00:53) (15 - 15)  SpO2: 100% (09 Aug 2022 00:53) (100% - 100%)    Parameters below as of 09 Aug 2022 00:53  Patient On (Oxygen Delivery Method): room air        General:  Constitutional: Male, appears stated age, in mild apparent distress including pain  Head: Normocephalic & atraumatic.    Neurological (>12):  MS: Awake, alert, oriented to person, place, situation, time. Normal affect. Follows all commands.    Language: Speech is clear, fluent     CNs: PERRLA (R = 3mm, L = 3mm). VFF. EOMI no nystagmus. V1-3 intact to LT. No facial asymmetry b/l, full eye closure strength b/l. Gag reflex deferred. Head turning & shoulder shrug intact b/l. Tongue midline, normal movements, no atrophy.    Motor: Normal muscle bulk & tone. No noticeable tremor or seizure. No pronator drift. 5/5 strength in UE's.     	H-Flex	H-ABd	H-ADd	K-Flex	K-Ext	D-Flex	P-Flex  R	3	3	3	4-	4-	4	4	 	   L	3	3	3	4-	4-	4	4		     Sensation: Intact to LT b/l throughout.     Cortical: Extinction on DSS (neglect): none    Reflexes:              Biceps(C5)       BR(C6)     Triceps(C7)               Patellar(L4)    Achilles(S1)    Plantar Resp  R	2	          2	             2		        3		    3		Up  L	2	          2	             2		        3		    3		Up     Coordination: No dysmetria to FTN    Gait: Deferred    LABORATORY:  CBC   Chem       LFTs   Coagulopathy   Lipid Panel   A1c   Cardiac enzymes     U/A   CSF  Immunological  Other    STUDIES & IMAGING:  Studies (EKG, EEG, EMG, etc):     Radiology (XR, CT, MR, U/S, TTE/GARFIELD): HPI: 31 y/o M hx of congenital HIV, GBS, chronic back pain, poly-substance abuse presenting for b/l LE weakness for which neurology was consulted. Duration 3 days. States weakness started in toes and slowly progressed to the hips, followed by numbness in the toes and progressively spreading to the hips. Endorsing also 2-3 days of bowel and urinary incontinence w/ pain/tenderness in the lower back region. Patient usually has muscles spasms in the lumbar region at baseline, however now worse than usual. Denies saddle anesthesia. Was diagnosed w/ GBS at [facility], according to patient. Does not follow with a neurologist. Takes Baclofen, Gabapentin, and Lyrica for b/l LE pain and muscle spasms. Denies any recent trauma or infections. Lives in group home. Denies numbness/tingling/weakness in the UE's.     Per chart review, was seen by neurology (1/2022 and 9/2021) for similar complaints of b/l LE weakness a/w urinary retention. 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 Patient has had hx of normal LP and normal spine MRI's. From last consult note, prior hx of GBS noted to be INCORRECT (refer to consult notes from 1/2022 and 9/2021).      REVIEW OF SYSTEMS  Per HPI    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      Asthma      Closed fracture of multiple ribs of right side, initial encounter      Cocaine abuse      Chronic sinusitis      Homeless      Guillain-[place]      History of orthopedic surgery  left arm        FAMILY HISTORY:  FH: HIV infection (Mother)      SOCIAL HISTORY:   T/E/D:   Occupation:   Lives with:     MEDICATIONS (HOME):  Home Medications:  baclofen 10 mg oral tablet: 1 tab(s) orally 3 times a day (01 Mar 2022 00:38)  gabapentin 300 mg oral capsule: 2 cap(s) orally 3 times a day (01 Mar 2022 00:38)  magnesium oxide 400 mg oral tablet: 1 tab(s) orally once a day (01 Mar 2022 00:38)  melatonin 5 mg oral tablet: 1 tab(s) orally once a day (at bedtime) (01 Mar 2022 00:38)  oseltamivir 75 mg oral capsule: 1 cap(s) orally 2 times a day until 3/6. (04 Mar 2022 13:43)  SEROquel 100 mg oral tablet: 100 mG in the morning and 200 mG in the evening. x (04 Mar 2022 13:43)    MEDICATIONS  (STANDING):  acetaminophen   IVPB .. 1000 milliGRAM(s) IV Intermittent once    MEDICATIONS  (PRN):    ALLERGIES/INTOLERANCES:  Allergies  Ceclor (Unknown)  Toradol (Anaphylaxis; Swelling)  Toradol (Swelling)    Intolerances    VITALS & EXAMINATION:  Vital Signs Last 24 Hrs  T(C): 36.8 (09 Aug 2022 00:53), Max: 36.8 (09 Aug 2022 00:53)  T(F): 98.2 (09 Aug 2022 00:53), Max: 98.2 (09 Aug 2022 00:53)  HR: 66 (09 Aug 2022 00:53) (66 - 66)  BP: 114/65 (09 Aug 2022 00:53) (114/65 - 114/65)  BP(mean): --  RR: 15 (09 Aug 2022 00:53) (15 - 15)  SpO2: 100% (09 Aug 2022 00:53) (100% - 100%)    Parameters below as of 09 Aug 2022 00:53  Patient On (Oxygen Delivery Method): room air        General:  Constitutional: Male, appears stated age, in mild apparent distress including pain  Head: Normocephalic & atraumatic.    Neurological (>12):  MS: Awake, alert, oriented to person, place, situation, time. Normal affect. Follows all commands.    Language: Speech is clear, fluent     CNs: PERRLA (R = 3mm, L = 3mm). VFF. EOMI no nystagmus. V1-3 intact to LT. No facial asymmetry b/l, full eye closure strength b/l. Gag reflex deferred. Head turning & shoulder shrug intact b/l. Tongue midline, normal movements, no atrophy.    Motor: Normal muscle bulk & tone. No noticeable tremor or seizure. No pronator drift. 5/5 strength in UE's.     	H-Flex	H-ABd	H-ADd	K-Flex	K-Ext	D-Flex	P-Flex  R	3	3	3	4-	4-	4	4	 	   L	3	3	3	4-	4-	4	4		     Sensation: Intact to LT b/l throughout.     Cortical: Extinction on DSS (neglect): none    Reflexes:              Biceps(C5)       BR(C6)     Triceps(C7)               Patellar(L4)    Achilles(S1)    Plantar Resp  R	2	          2	             2		        3		    3		Up  L	2	          2	             2		        3		    3		Up     Coordination: No dysmetria to FTN    Gait: Deferred    LABORATORY:  CBC   Chem       LFTs   Coagulopathy   Lipid Panel   A1c   Cardiac enzymes     U/A   CSF  Immunological  Other    STUDIES & IMAGING:  Studies (EKG, EEG, EMG, etc):     Radiology (XR, CT, MR, U/S, TTE/GARFIELD):

## 2022-08-09 NOTE — ED ADULT TRIAGE NOTE - CHIEF COMPLAINT QUOTE
alert oriented c/o difficulty walking x 2 days  has chronic muscle spasms  PMHx Guillain barre   c/o leg back and feet pain alert oriented c/o difficulty walking x 2 days  has chronic muscle spasms no complaints of SOB  PMHx Guillain barre   c/o leg back and feet pain

## 2022-08-09 NOTE — ED PROVIDER NOTE - ATTENDING CONTRIBUTION TO CARE
32M h/o congenital HIV, previous Guillan [place] syndrome p/w progressively worsening LE pain and weakness and difficulty.  Similar to previous episode of GBS, pt says he was admitted at [facility].  Pt says both his legs are weak x 3 days.  Pt having difficulty taking care of himself.  pt lives at a shelter and is having difficulty with the stairs, there's no help.  Denies recent drug use.  PMHX HIV+ congenital, neuropathy.  PSHX denies.  meds biktarvy, gabapentin, baclofen, lyrica, seroquel.  All ceclor (unk).  No fever or injury, no CP/abd pain, no back pain.  Bilat LE weakness, having difficulty lifting heels off bed, bilat LE flex/ex weakness.  Some hypersensitivity of both feet.  Possibly recurrent Guillan-[place] syndrome.  No UE or respiratory involvement.  Plan check labs, CTH, UA, tox scrn, CXR, neuro eval, admit.    VS:  unremarkable    GEN - NAD;   well appearing;   A+O x3   HEAD - NC/AT     ENT - PEERL, EOMI, mucous membranes    moist , no discharge      NECK: Neck supple, non-tender without lymphadenopathy, no masses, no JVD  PULM - CTA b/l,  symmetric breath sounds  COR -  normal heart sounds    ABD - , ND, NT, soft,  BACK - no CVA tenderness, nontender spine     EXTREMS - no edema, no deformity, warm and well perfused    SKIN - no rash    or bruising      NEUROLOGIC - alert, face symmetric, speech fluent, sensation nl, motor UE no focal deficit - bilat LE - weakness 2/5 bilat of hip flexors as well as flex/ex of feet..  (+)feet hypersensitive bilat

## 2022-08-09 NOTE — H&P ADULT - PROBLEM SELECTOR PLAN 1
Acute on chronic lower extremity weakness over past 2 days, associated with spasms, hyperreflexia.  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  - B12, MMA, homocysteine, anti-intrinsic factor Ab  -PT/OT  - vitals q6  - monitor respiratory status

## 2022-08-09 NOTE — ED PROVIDER NOTE - OBJECTIVE STATEMENT
32M h/o congenital HIV, previous Guillan [place] syndrome p/w progressively worsening LE pain and weakness and difficulty.  Similar to previous episode of GBS, pt says he was admitted at [facility].  Pt says both his legs are weak x 3 days.  Pt having difficulty taking care of himself.  pt lives at a shelter and is having difficulty with the stairs, there's no help.  Denies recent drug use.  PMHX HIV+ congenital, neuropathy.  PSHX denies.  meds biktarvy, gabapentin, baclofen, lyrica, seroquel.  All ceclor (unk).  No fever or injury, no CP/abd pain, no back pain.  Bilat LE weakness, having difficulty lifting heels off bed, bilat LE flex/ex weakness.  Some hypersensitivity of both feet.  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  No UE or respiratory involvement.  Plan check labs, CTH, UA, tox scrn, CXR, neuro eval, admit.    VS:  unremarkable    GEN - NAD;   well appearing;   A+O x3   HEAD - NC/AT     ENT - PEERL, EOMI, mucous membranes    moist , no discharge      NECK: Neck supple, non-tender without lymphadenopathy, no masses, no JVD  PULM - CTA b/l,  symmetric breath sounds  COR -  normal heart sounds    ABD - , ND, NT, soft,  BACK - no CVA tenderness, nontender spine     EXTREMS - no edema, no deformity, warm and well perfused    SKIN - no rash    or bruising      NEUROLOGIC - alert, face symmetric, speech fluent, sensation nl, motor UE no focal deficit - bilat LE - weakness 2/5 bilat of hip flexors as well as flex/ex of feet..  (+)feet hypersensitive bilat

## 2022-08-09 NOTE — ED PROVIDER NOTE - CLINICAL SUMMARY MEDICAL DECISION MAKING FREE TEXT BOX
32M h/o congenital HIV, previous Guillan [place] syndrome p/w progressively worsening LE pain and weakness and difficulty.  Similar to previous episode of GBS, pt says he was admitted at [facility].  Pt says both his legs are weak x 3 days.  Pt having difficulty taking care of himself.  pt lives at a shelter and is having difficulty with the stairs, there's no help.  Denies recent drug use.  PMHX HIV+ congenital, neuropathy.  PSHX denies.  meds biktarvy, gabapentin, baclofen, lyrica, seroquel.  All ceclor (unk).  No fever or injury, no CP/abd pain, no back pain.  Bilat LE weakness, having difficulty lifting heels off bed, bilat LE flex/ex weakness.  Some hypersensitivity of both feet.  Possibly recurrent Guillan-[place] syndrome.  No UE or respiratory involvement.  Plan check labs, CTH, UA, tox scrn, CXR, neuro eval, admit.

## 2022-08-09 NOTE — H&P ADULT - PROBLEM SELECTOR PLAN 3
- Last CD4 415 (3/22)  - Follows with Dr. Easton Hunter at [facility]  - F/u CD4  - C/w biktarvy - Last CD4 415 (3/22)  - HIV Viral load 5328 (3/22)  - Follows with Dr. Easton Hunter at [facility]  - F/u CD4  - C/w biktarvy

## 2022-08-09 NOTE — ED ADULT NURSE NOTE - CHIEF COMPLAINT QUOTE
alert oriented c/o difficulty walking x 2 days  has chronic muscle spasms no complaints of SOB  PMHx Guillain barre   c/o leg back and feet pain

## 2022-08-09 NOTE — ED PROVIDER NOTE - PHYSICAL EXAMINATION
VS:  unremarkable    GEN - NAD;   well appearing;   A+O x3   HEAD - NC/AT     ENT - PEERL, EOMI, mucous membranes    moist , no discharge      NECK: Neck supple, non-tender without lymphadenopathy, no masses, no JVD  PULM - CTA b/l,  symmetric breath sounds  COR -  normal heart sounds    ABD - , ND, NT, soft,  BACK - no CVA tenderness, nontender spine     EXTREMS - no edema, no deformity, warm and well perfused    SKIN - no rash    or bruising      NEUROLOGIC - alert, face symmetric, speech fluent, sensation nl, motor UE no focal deficit - bilat LE - weakness 2/5 bilat of hip flexors as well as flex/ex of feet..  (+)feet hypersensitive bilat

## 2022-08-09 NOTE — H&P ADULT - ASSESSMENT
32 year old with hx of HIV, HIV myelopathy, asthma, afib who is here for acute on chronic lower extremity weakness of 2 day duration. Associated with fecal and urinary incontinence. Physical exam is significant for lower extremity weakness, muscle spasms, and hyperreflexia. Negative CT Head, no findings on CT lumbar and thoracic. Patients acute on chronic exacerbation of lower extremity weakness is likely secondary to HIV myelopathy vs transverse myelitis vs other demyelinating disorder vs less likely AIDP.      32 year old with hx of HIV, HIV myelopathy, asthma, afib who is here for acute on chronic lower extremity weakness of 2 day duration. Associated with fecal and urinary incontinence. Physical exam is significant for lower extremity weakness, muscle spasms, and hyperreflexia. Negative CT Head, no findings on CT lumbar and thoracic. Patient's acute on chronic exacerbation of lower extremity weakness is likely secondary to HIV myelopathy vs transverse myelitis vs other demyelinating disorder vs less likely AIDP.

## 2022-08-09 NOTE — ED ADULT NURSE REASSESSMENT NOTE - NS ED NURSE REASSESS COMMENT FT1
Pt returned from MRI. unable to tolerate with valium. MD Pierre made aware, per MD, no MRI for now, will obtain CT and xray.
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
## 2022-08-09 NOTE — ED ADULT NURSE NOTE - OBJECTIVE STATEMENT
33yo male received in intake room 7. pt A&O4, hx of HIV, GBS, chronic back pain, c/o bilateral left extremities and urinary/bowel incontinence starting three days ago, now with difficulty ambulating, similar to his past GBS episode. denies numbness and tingling to extremities. also c/o chronic lower back pain which is worse than usual. Respiration even and non-labored, in NAD. MD melendez in progress. 20G IV placed in right forearm, lab drawn and sent. Medicated as per order. EKG in progress.

## 2022-08-09 NOTE — H&P ADULT - NSHPPHYSICALEXAM_GEN_ALL_CORE
LOS:     VITALS:   T(C): 36.4 (08-09-22 @ 15:33), Max: 36.8 (08-09-22 @ 00:53)  HR: 75 (08-09-22 @ 15:33) (58 - 75)  BP: 122/80 (08-09-22 @ 15:33) (111/71 - 132/72)  RR: 18 (08-09-22 @ 15:33) (15 - 18)  SpO2: 100% (08-09-22 @ 15:33) (100% - 100%)    GENERAL: NAD, lying in bed comfortably  HEAD:  Atraumatic, Normocephalic  EYES: EOMI, PERRLA, conjunctiva and sclera clear  ENT: Moist mucous membranes  NECK: Supple, No JVD  CHEST/LUNG: Clear to auscultation bilaterally; No rales, rhonchi, wheezing, or rubs. Unlabored respirations  HEART: Regular rate and rhythm; No murmurs, rubs, or gallops  ABDOMEN: BSx4; Soft, nontender, nondistended  EXTREMITIES:  2+ Peripheral Pulses, brisk capillary refill. No clubbing, cyanosis, or edema  MSK:  Motor: Normal muscle bulk & tone.  5/5 strength in UE's.     	H-Flex	H-Extension	K-Ext	D-Flex	P-Flex  R	3	4		3+	3+	3+	 	   L	3	4		3+	3+	3+		     Sensation: Intact to LT b/l throughout.     Cortical: Extinction on DSS (neglect): none    Reflexes:              Biceps(C5)       BR(C6)     Triceps(C7)               Patellar(L4)    Achilles(S1)     R	2	          2	             2		        3		    2  L	2	          2	             2		        3		    2		   NERVOUS SYSTEM:  A&Ox3, no focal deficits   SKIN: No rashes or lesions LOS:     VITALS:   T(C): 36.4 (08-09-22 @ 15:33), Max: 36.8 (08-09-22 @ 00:53)  HR: 75 (08-09-22 @ 15:33) (58 - 75)  BP: 122/80 (08-09-22 @ 15:33) (111/71 - 132/72)  RR: 18 (08-09-22 @ 15:33) (15 - 18)  SpO2: 100% (08-09-22 @ 15:33) (100% - 100%)    GENERAL: NAD, lying in bed comfortably  HEAD:  Atraumatic, Normocephalic  EYES: EOMI, PERRLA, conjunctiva and sclera clear  ENT: Moist mucous membranes  NECK: Supple, No JVD  CHEST/LUNG: Clear to auscultation bilaterally; No rales, rhonchi, wheezing, or rubs. Unlabored respirations  HEART: Regular rate and rhythm; No murmurs, rubs, or gallops  ABDOMEN: BSx4; Soft, nontender, nondistended  EXTREMITIES:  2+ Peripheral Pulses, brisk capillary refill. No clubbing, cyanosis, or edema  MSK:  Motor: Normal muscle bulk & tone.  5/5 strength in UE's.     	H-Flex	H-Extension	K-Ext	D-Flex	P-Flex  R	3	4		3+	3+	3+	 	   L	3	4		3+	3+	3+		       Reflexes:              Biceps(C5)       BR(C6)     Triceps(C7)               Patellar(L4)    Achilles(S1)     R	2	          2	             2		        3		    3  L	2	          2	             2		        3		    3		   NERVOUS SYSTEM:  A&Ox3, no focal deficits   SKIN: No rashes or lesions LOS:     VITALS:   T(C): 36.4 (08-09-22 @ 15:33), Max: 36.8 (08-09-22 @ 00:53)  HR: 75 (08-09-22 @ 15:33) (58 - 75)  BP: 122/80 (08-09-22 @ 15:33) (111/71 - 132/72)  RR: 18 (08-09-22 @ 15:33) (15 - 18)  SpO2: 100% (08-09-22 @ 15:33) (100% - 100%)    GENERAL: NAD, lying in bed comfortably  HEAD:  Atraumatic, Normocephalic  EYES: EOMI, PERRLA, conjunctiva and sclera clear  ENT: Moist mucous membranes. Thrush on lateral edge of mouth  NECK: Supple, No JVD  CHEST/LUNG: Clear to auscultation bilaterally; No rales, rhonchi, wheezing, or rubs. Unlabored respirations  HEART: Regular rate and rhythm; No murmurs, rubs, or gallops  ABDOMEN: BSx4; Soft, nontender, nondistended  EXTREMITIES:  2+ Peripheral Pulses, brisk capillary refill. No clubbing, cyanosis, or edema  MSK:  Motor: Normal muscle bulk & tone.  5/5 strength in UE's.     	H-Flex	H-Extension	K-Ext	D-Flex	P-Flex  R	3	4		3+	3+	3+	 	   L	3	4		3+	3+	3+		       Reflexes:              Biceps(C5)       BR(C6)     Triceps(C7)               Patellar(L4)    Achilles(S1)     R	2	          2	             2		        3		    3  L	2	          2	             2		        3		    3		   NERVOUS SYSTEM:  A&Ox3, no focal deficits   SKIN: No rashes or lesions

## 2022-08-09 NOTE — H&P ADULT - NSHPLABSRESULTS_GEN_ALL_CORE
.  LABS:                         12.3   2.71  )-----------( 175      ( 09 Aug 2022 03:20 )             36.8         136  |  103  |  10  ----------------------------<  94  4.1   |  23  |  0.68    Ca    8.8      09 Aug 2022 03:20  Mg     1.40         TPro  7.8  /  Alb  3.9  /  TBili  0.5  /  DBili  x   /  AST  47<H>  /  ALT  24  /  AlkPhos  70        Urinalysis Basic - ( 09 Aug 2022 04:30 )    Color: Yellow / Appearance: Clear / S.031 / pH: x  Gluc: x / Ketone: Trace  / Bili: Small / Urobili: 6 mg/dL   Blood: x / Protein: 30 mg/dL / Nitrite: Negative   Leuk Esterase: Negative / RBC: 2 /HPF / WBC 4 /HPF   Sq Epi: x / Non Sq Epi: 2 /HPF / Bacteria: Negative            RADIOLOGY, EKG & ADDITIONAL TESTS: Reviewed. .  LABS:                         12.3   2.71  )-----------( 175      ( 09 Aug 2022 03:20 )             36.8         136  |  103  |  10  ----------------------------<  94  4.1   |  23  |  0.68    Ca    8.8      09 Aug 2022 03:20  Mg     1.40         TPro  7.8  /  Alb  3.9  /  TBili  0.5  /  DBili  x   /  AST  47<H>  /  ALT  24  /  AlkPhos  70        Urinalysis Basic - ( 09 Aug 2022 04:30 )    Color: Yellow / Appearance: Clear / S.031 / pH: x  Gluc: x / Ketone: Trace  / Bili: Small / Urobili: 6 mg/dL   Blood: x / Protein: 30 mg/dL / Nitrite: Negative   Leuk Esterase: Negative / RBC: 2 /HPF / WBC 4 /HPF   Sq Epi: x / Non Sq Epi: 2 /HPF / Bacteria: Negative          EKG 22 : Sinus bradycardia, 52 BPM, QTc - 396 ms  RADIOLOGY, EKG & ADDITIONAL TESTS: Reviewed.

## 2022-08-09 NOTE — H&P ADULT - PROBLEM SELECTOR PLAN 2
- c/w gabapentin 900 mg TID  - c/w lyrica 50 mg BID  - c/w baclofen 10 mg TID - c/w gabapentin 900 mg TID  - c/w lyrica 50 mg BID  - c/w baclofen 5 mg TID (10 mg home dose) - c/w gabapentin 600 mg TID  - f/u [facility] --> lyrica 50 mg BID  - c/w baclofen 5 mg TID (10 mg home dose)

## 2022-08-09 NOTE — H&P ADULT - PROBLEM SELECTOR PLAN 4
Diet:  DVT Prophylaxis: Diet: regular  DVT Prophylaxis: lovenox 40 mg PO QD - c/w seroquel 100 mg qAM, 200 mg qPM  - melatonin 6 mg qPM PRN

## 2022-08-09 NOTE — H&P ADULT - NSHPREVIEWOFSYSTEMS_GEN_ALL_CORE
REVIEW OF SYSTEMS:    CONSTITUTIONAL: No weakness, fevers or chills  EYES/ENT: No visual changes;  No vertigo or throat pain   NECK: No pain or stiffness  RESPIRATORY: No cough, wheezing, hemoptysis; No shortness of breath  CARDIOVASCULAR: No chest pain or palpitations  GASTROINTESTINAL: No abdominal or epigastric pain. No nausea, vomiting, or hematemesis; No diarrhea or constipation. No melena or hematochezia.  GENITOURINARY: No dysuria, frequency or hematuria  NEUROLOGICAL: Weakness and numbness of the lower extremity.   SKIN: No itching, rashes

## 2022-08-09 NOTE — ED PROVIDER NOTE - NSICDXPASTMEDICALHX_GEN_ALL_CORE_FT
PAST MEDICAL HISTORY:  Asthma     Chronic sinusitis     Closed fracture of multiple ribs of right side, initial encounter     Cocaine abuse     Guillain-[place]     HIV (human immunodeficiency virus infection) from birth    Homeless
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
## 2022-08-09 NOTE — H&P ADULT - ATTENDING COMMENTS
Pt seen and examined by me on 8/9/22. Mr. Garsia is a 33 yo M w/ PMH of HIV and HIV myelopathy who p/w 3 days of worsening of his chronic bilateral lower extremity weakness, associated with bowel and bladder symptoms. He reports being evaluated at [facility] 2 days prior to admission with reportedly unremarkable CT scans. In ED, CT TL spine and CT head are unremarkable. 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   -neuro consult appreciated - pt has had similar episodes in past for which he's undergone LP, imaging. Reported h/o GBS but was not confirmed.   -F/u labs: B6, folate, HgbA1C, TSH, ESR, CRP, Mg, Cu, Zn,  ACE level, urine Ca++, syphilis screen, HTLV, SHAI, MICHEAL, ANCA, CCP, B12, MMA, homocysteine, anti-intrinsic factor Ab  -c/w baclofen for spasticity, physical therapy evaluation  -c/w biktarvy for HIV  - for D/c care coordination  -DVT ppx

## 2022-08-09 NOTE — CONSULT NOTE ADULT - ASSESSMENT
INCOMPLETE (TO BE UPDATED)  Assessment:       Plan:      -Management & disposition to be discussed with neuro attending, Dr. Fam Assessment: 33 y/o M hx of congenital HIV, GBS, chronic back pain, presenting for b/l LE weakness for which neurology was consulted. Duration 3 days. States weakness started in toes and slowly progressed to the hips, followed by numbness in the toes and progressively spreading to the hips. Endorsing also 2-3 days of bowel and urinary incontinence w/ pain/tenderness in the lower back region. Patient usually has muscles spasms in the lumbar region at baseline, however now worse than usual. Denies saddle anesthesia. Was diagnosed w/ GBS at [facility], according to patient. Does not follow with a neurologist. Takes Baclofen, Gabapentin, and Lyrica for b/l LE pain and muscle spasms. Denies any recent trauma or infections. Lives in group home. Denies numbness/tingling/weakness in the UE's. Exam prominent for some effort against gravity in the LE's. 3+ reflexes throughout LE's. Upgoing Babinski b/l. Moderate TTP in lumbar region. Given hx and presentation, concern for cord etiology vs de-myelinating process such transverse myelitis. Also of consideration is recurrence of AIDP vs MSK-related etiology vs pain limited 2/2 worsening of baseline muscle spasms.        Plan:  -MRI T/L spine w/ w/o, urdent  -Consider Flexural for muscle spasms  -Possible consideration for LP, after MRI T/L spine  -B12, B6, folate, HgbA1C, TSH, ESR, CRP, Mg, Cu, Zn   -PT/OT  -Further recs pending MR imaging    -Management & disposition to be discussed with neuro attending, Dr. Fam Assessment: 31 y/o M hx of congenital HIV, GBS, chronic back pain, poly-substance abuse presenting for b/l LE weakness for which neurology was consulted. Duration 3 days. States weakness started in toes and slowly progressed to the hips, followed by numbness in the toes and progressively spreading to the hips. Endorsing also 2-3 days of bowel and urinary incontinence w/ pain/tenderness in the lower back region. Patient usually has muscles spasms in the lumbar region at baseline, however now worse than usual. Denies saddle anesthesia. Was diagnosed w/ GBS at [facility], according to patient. Does not follow with a neurologist. Takes Baclofen, Gabapentin, and Lyrica for b/l LE pain and muscle spasms. Denies any recent trauma or infections. Lives in group home. Denies numbness/tingling/weakness in the UE's. Exam prominent for some effort against gravity in the LE's. 3+ reflexes throughout LE's. Upgoing Babinski b/l. Moderate TTP in lumbar region. Given hx and presentation, concern for cord etiology vs de-myelinating process such transverse myelitis vs vascular myelopathy in setting of congenital HIV. Also of consideration is MSK-related etiology such as muscle spasms causing pain-limited weakness of the LE's.       Plan:  -MRI T/L spine w/ w/o, urgent  -Consider Flexural for muscle spasms  -Possible consideration for LP, after MRI T/L spine however likely to be low-yield  -B12, B6, folate, HgbA1C, TSH, ESR, CRP, Mg, Cu, Zn, MMA, homocysteine   -PT/OT  -Further recs pending MR imaging    -Management & disposition to be discussed with neuro attending, Dr. Fam Assessment: 31 y/o M hx of congenital HIV, GBS, chronic back pain, poly-substance abuse presenting for b/l LE weakness for which neurology was consulted. Duration 3 days. States weakness started in toes and slowly progressed to the hips, followed by numbness in the toes and progressively spreading to the hips. Endorsing also 2-3 days of bowel and urinary incontinence w/ pain/tenderness in the lower back region. Patient usually has muscles spasms in the lumbar region at baseline, however now worse than usual. Denies saddle anesthesia. Was diagnosed w/ GBS at [facility], according to patient. Does not follow with a neurologist. Takes Baclofen, Gabapentin, and Lyrica for b/l LE pain and muscle spasms. Denies any recent trauma or infections. Lives in group home. Denies numbness/tingling/weakness in the UE's. Exam prominent for some effort against gravity in the LE's. 3+ reflexes throughout LE's. Upgoing Babinski b/l. Moderate TTP in lumbar region. Given hx and presentation, concern for cord etiology vs de-myelinating process such transverse myelitis vs vascular myelopathy in setting of congenital HIV. Also of consideration is MSK-related etiology such as muscle spasms causing pain-limited weakness of the LE's.       Plan:  -MRI C/T/L spine w/ w/o, urgent  -Consider Flexural for muscle spasms  -Possible consideration for LP, after MRI T/L spine however likely to be low-yield  -B12, B6, folate, HgbA1C, TSH, ESR, CRP, Mg, Cu, Zn, MMA, homocysteine   -PT/OT  -Further recs pending MR imaging    -Management & disposition to be discussed with neuro attending, Dr. Fam Assessment: 33 y/o M hx of congenital HIV, GBS, chronic back pain, poly-substance abuse presenting for b/l LE weakness for which neurology was consulted. Duration 3 days. States weakness started in toes and slowly progressed to the hips, followed by numbness in the toes and progressively spreading to the hips. Endorsing also 2-3 days of bowel and urinary incontinence w/ pain/tenderness in the lower back region. Patient usually has muscles spasms in the lumbar region at baseline, however now worse than usual. Denies saddle anesthesia. Was diagnosed w/ GBS at [facility], according to patient. Does not follow with a neurologist. Takes Baclofen, Gabapentin, and Lyrica for b/l LE pain and muscle spasms. Denies any recent trauma or infections. Lives in group home. Denies numbness/tingling/weakness in the UE's. Exam prominent for some effort against gravity in the LE's. 3+ reflexes throughout LE's. Upgoing Babinski b/l. Moderate TTP in lumbar region. Given hx and presentation, concern for cord etiology vs de-myelinating process such transverse myelitis vs vascular myelopathy in setting of congenital HIV. Also of consideration is MSK-related etiology such as muscle spasms causing pain-limited weakness of the LE's.     c    Plan:  -Work up if not done previously: B6, folate, HgbA1C, TSH, ESR, CRP, Mg, Cu, Zn,  ACE level, urine Ca++, syphilis screen, HTLV, SHAI, MICHEAL, ANCA, CCP.  - B12, MMA, homocysteine, anti-intrinsic factor Ab  -PT/OT    -Management & disposition to be discussed with neuro attending, Dr. Fam

## 2022-08-09 NOTE — CHART NOTE - NSCHARTNOTEFT_GEN_A_CORE
Patient Name: Laurence Mckeon Date: 1990  Address: [address] [place] [id number]: Male    Rx Written	Rx Dispensed	Drug	Quantity	Days Supply	Prescriber Name	Prescriber Mickie #	Payment Method	Dispenser  05/03/2022	05/03/2022	pregabalin 50 mg capsule	90	30	[facility]	[id number]	Insurance	[facility]  04/25/2022	04/25/2022	tramadol hcl 50 mg tablet	21	7	[facility]	[id number]	Insurance	[facility]    Meagan Monroe MD  PGY-2 | Internal Medicine

## 2022-08-09 NOTE — H&P ADULT - NSHPSOCIALHISTORY_GEN_ALL_CORE
Lives in [facility]. Smokes marijuana. 20 pack year history of cigarettes. No alcohol, no IV drug use.

## 2022-08-10 LAB
ANION GAP SERPL CALC-SCNC: 12 MMOL/L — SIGNIFICANT CHANGE UP (ref 7–14)
BASOPHILS # BLD AUTO: 0.01 K/UL — SIGNIFICANT CHANGE UP (ref 0–0.2)
BASOPHILS NFR BLD AUTO: 0.4 % — SIGNIFICANT CHANGE UP (ref 0–2)
BUN SERPL-MCNC: 6 MG/DL — LOW (ref 7–23)
CALCIUM SERPL-MCNC: 8.7 MG/DL — SIGNIFICANT CHANGE UP (ref 8.4–10.5)
CALCIUM UR-MCNC: 5 MG/DL — SIGNIFICANT CHANGE UP
CHLORIDE SERPL-SCNC: 105 MMOL/L — SIGNIFICANT CHANGE UP (ref 98–107)
CO2 SERPL-SCNC: 22 MMOL/L — SIGNIFICANT CHANGE UP (ref 22–31)
CREAT SERPL-MCNC: 0.72 MG/DL — SIGNIFICANT CHANGE UP (ref 0.5–1.3)
CRP SERPL-MCNC: <3 MG/L — SIGNIFICANT CHANGE UP
CULTURE RESULTS: SIGNIFICANT CHANGE UP
EGFR: 124 ML/MIN/1.73M2 — SIGNIFICANT CHANGE UP
EOSINOPHIL # BLD AUTO: 0.03 K/UL — SIGNIFICANT CHANGE UP (ref 0–0.5)
EOSINOPHIL NFR BLD AUTO: 1.1 % — SIGNIFICANT CHANGE UP (ref 0–6)
ERYTHROCYTE [SEDIMENTATION RATE] IN BLOOD: 12 MM/HR — SIGNIFICANT CHANGE UP (ref 1–15)
GLUCOSE SERPL-MCNC: 83 MG/DL — SIGNIFICANT CHANGE UP (ref 70–99)
HCT VFR BLD CALC: 40.6 % — SIGNIFICANT CHANGE UP (ref 39–50)
HCYS SERPL-MCNC: 12.8 UMOL/L — SIGNIFICANT CHANGE UP
HGB BLD-MCNC: 13.3 G/DL — SIGNIFICANT CHANGE UP (ref 13–17)
IANC: 0.85 K/UL — LOW (ref 1.8–7.4)
IMM GRANULOCYTES NFR BLD AUTO: 0 % — SIGNIFICANT CHANGE UP (ref 0–1.5)
LYMPHOCYTES # BLD AUTO: 1.67 K/UL — SIGNIFICANT CHANGE UP (ref 1–3.3)
LYMPHOCYTES # BLD AUTO: 59.6 % — HIGH (ref 13–44)
MAGNESIUM SERPL-MCNC: 1.4 MG/DL — LOW (ref 1.6–2.6)
MCHC RBC-ENTMCNC: 29 PG — SIGNIFICANT CHANGE UP (ref 27–34)
MCHC RBC-ENTMCNC: 32.8 GM/DL — SIGNIFICANT CHANGE UP (ref 32–36)
MCV RBC AUTO: 88.5 FL — SIGNIFICANT CHANGE UP (ref 80–100)
MONOCYTES # BLD AUTO: 0.24 K/UL — SIGNIFICANT CHANGE UP (ref 0–0.9)
MONOCYTES NFR BLD AUTO: 8.6 % — SIGNIFICANT CHANGE UP (ref 2–14)
NEUTROPHILS # BLD AUTO: 0.85 K/UL — LOW (ref 1.8–7.4)
NEUTROPHILS NFR BLD AUTO: 30.3 % — LOW (ref 43–77)
NRBC # BLD: 0 /100 WBCS — SIGNIFICANT CHANGE UP
NRBC # FLD: 0 K/UL — SIGNIFICANT CHANGE UP
PHOSPHATE SERPL-MCNC: 2.9 MG/DL — SIGNIFICANT CHANGE UP (ref 2.5–4.5)
PLATELET # BLD AUTO: 158 K/UL — SIGNIFICANT CHANGE UP (ref 150–400)
POTASSIUM SERPL-MCNC: 3.9 MMOL/L — SIGNIFICANT CHANGE UP (ref 3.5–5.3)
POTASSIUM SERPL-SCNC: 3.9 MMOL/L — SIGNIFICANT CHANGE UP (ref 3.5–5.3)
RBC # BLD: 4.59 M/UL — SIGNIFICANT CHANGE UP (ref 4.2–5.8)
RBC # FLD: 13.4 % — SIGNIFICANT CHANGE UP (ref 10.3–14.5)
SODIUM SERPL-SCNC: 139 MMOL/L — SIGNIFICANT CHANGE UP (ref 135–145)
SPECIMEN SOURCE: SIGNIFICANT CHANGE UP
T PALLIDUM AB TITR SER: NEGATIVE — SIGNIFICANT CHANGE UP
WBC # BLD: 2.8 K/UL — LOW (ref 3.8–10.5)
WBC # FLD AUTO: 2.8 K/UL — LOW (ref 3.8–10.5)

## 2022-08-10 PROCEDURE — 99222 1ST HOSP IP/OBS MODERATE 55: CPT

## 2022-08-10 PROCEDURE — 99232 SBSQ HOSP IP/OBS MODERATE 35: CPT | Mod: GC

## 2022-08-10 RX ORDER — BACLOFEN 100 %
10 POWDER (GRAM) MISCELLANEOUS EVERY 8 HOURS
Refills: 0 | Status: DISCONTINUED | OUTPATIENT
Start: 2022-08-10 | End: 2022-08-11

## 2022-08-10 RX ORDER — MAGNESIUM SULFATE 500 MG/ML
1 VIAL (ML) INJECTION ONCE
Refills: 0 | Status: COMPLETED | OUTPATIENT
Start: 2022-08-10 | End: 2022-08-10

## 2022-08-10 RX ORDER — GABAPENTIN 400 MG/1
2 CAPSULE ORAL
Qty: 0 | Refills: 0 | DISCHARGE

## 2022-08-10 RX ORDER — BACLOFEN 100 %
5 POWDER (GRAM) MISCELLANEOUS EVERY 8 HOURS
Refills: 0 | Status: DISCONTINUED | OUTPATIENT
Start: 2022-08-10 | End: 2022-08-10

## 2022-08-10 RX ADMIN — QUETIAPINE FUMARATE 200 MILLIGRAM(S): 200 TABLET, FILM COATED ORAL at 21:27

## 2022-08-10 RX ADMIN — Medication 650 MILLIGRAM(S): at 13:20

## 2022-08-10 RX ADMIN — Medication 50 MILLIGRAM(S): at 21:25

## 2022-08-10 RX ADMIN — BICTEGRAVIR SODIUM, EMTRICITABINE, AND TENOFOVIR ALAFENAMIDE FUMARATE 1 TABLET(S): 30; 120; 15 TABLET ORAL at 12:24

## 2022-08-10 RX ADMIN — Medication 10 MILLIGRAM(S): at 21:25

## 2022-08-10 RX ADMIN — GABAPENTIN 600 MILLIGRAM(S): 400 CAPSULE ORAL at 12:22

## 2022-08-10 RX ADMIN — Medication 10 MILLIGRAM(S): at 12:22

## 2022-08-10 RX ADMIN — Medication 650 MILLIGRAM(S): at 12:23

## 2022-08-10 RX ADMIN — Medication 650 MILLIGRAM(S): at 21:56

## 2022-08-10 RX ADMIN — Medication 6 MILLIGRAM(S): at 21:25

## 2022-08-10 RX ADMIN — Medication 5 MILLIGRAM(S): at 05:25

## 2022-08-10 RX ADMIN — Medication 100 GRAM(S): at 18:56

## 2022-08-10 RX ADMIN — GABAPENTIN 600 MILLIGRAM(S): 400 CAPSULE ORAL at 05:25

## 2022-08-10 RX ADMIN — Medication 650 MILLIGRAM(S): at 21:26

## 2022-08-10 NOTE — PROGRESS NOTE ADULT - ATTENDING COMMENTS
Mr. Garsia is a 33 yo M w/ PMH of HIV and HIV myelopathy who p/w 3 days of worsening of his chronic bilateral lower extremity weakness, associated with bowel and bladder symptoms. He reports being evaluated at [facility] 2 days prior to admission with reportedly unremarkable CT scans. In ED, CT TL spine and CT head are unremarkable. 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   -neuro consult appreciated - pt has had similar episodes in past for which he's undergone LP, imaging. Reported h/o GBS but was not confirmed.   -c/w baclofen for spasticity, physical therapy evaluation  -c/w biktarvy for HIV, he hasn't taken meds for more than 1 month now; ID consult appreciated; f/u T cells, viral load, check genosure archive  - for D/c care coordination  -DVT ppx

## 2022-08-10 NOTE — PROVIDER CONTACT NOTE (OTHER) - REASON
Pt left leg weaker than right
Pt refused lovenox sub q and pt is sleepy, so maybe I should hold bedtime seroquel and melatonin
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
## 2022-08-10 NOTE — PHYSICAL THERAPY INITIAL EVALUATION ADULT - NSPTDISCHREC_GEN_A_CORE
Anticipated discharge to rehab facility to address current functional limitation to optimize safety to allow pt. to reach their optimal level of function.

## 2022-08-10 NOTE — PHYSICAL THERAPY INITIAL EVALUATION ADULT - PERTINENT HX OF CURRENT PROBLEM, REHAB EVAL
Pt is a 32 year old male who presented to [facility] for acute on chronic worsening lower extremity weakness.

## 2022-08-10 NOTE — CONSULT NOTE ADULT - ASSESSMENT
* THIS IS AN INCOMPLETE NOTE. 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, asthma who is here for acute on chronic worsening lower extremity weakness    ID is consulted for HIV care  Symptoms started in 2018 after he received flu shot, was initially diagnosed with suspected GBS but never confirmed  Has been having relapsing/remitting LE weakness, associated with chronic low back, thigh and calf pain/spasm  Returned with another episode happened 4 days ago, with difficulty ambulating  Afebrile on admission, leukopenia and neutropenia  RVP/COVID negative  Syphilis negative  UA negative  CT T/L spine unremarkable  CTH unremarkable  CXR no PNA  ESR/CRP unremarkable  Previous work up including multiple MRIs and LP without definitive diagnosis    Patient's HIV is not suppressed, most recent CD4 415/21%, VL 5300 in 2/2022  Used to get care from [facility] but has been getting ARTs from PCP (?)  Switched to Biktarvy a few years back from [facility], however has not been taking it for a while  No Genosure available in [facility]  Currently not sexually active, no history of IVDU    Overall it is unclear if patient's spasm/neurologic symptoms are secondary to HIV associated myelopathy  It is a diagnosis of exclusion and we have to rule out other infectious/metabolic/demylinating diseases  There is no definitive treatment for HIV myelopathy and suppression of HIV does not guarantee improvement of symptoms  Regardless he will need to aggressive HIV care to prevent other HIV-related diseases and to prevent viral resistance      IMPRESSION:  HIV  Myelopathy  Leukopenia    RECOMMENDATIONS:  - Continue Biktarvy  - Check CD4 count, HIV VL, Genosure Archive, QuantiFeron, Hep C (ordered)  - Follow up HTLV 1/2 assay  - Consider Behavioral health eval as patient was very emotional during interview  - Contacted HIV outreach program for assistance  - Follow up in ID office with Dr. Hunter outpatient  - Trend WBC, fever curve, transaminases, creatinine daily  - Will continue to follow      Patient is seen and examined with attending and case is discussed with primary team      Sabina Sweeney D.O.  PGY-5 Infectious Diseases Fellow  Please contact me via page or text through [employer] Teams  If after 5PM or on weekends, please call [phone]

## 2022-08-10 NOTE — PROGRESS NOTE ADULT - PROBLEM SELECTOR PLAN 1
Acute on chronic lower extremity weakness over past 2 days, associated with spasms, hyperreflexia.  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  - B12, MMA, homocysteine, anti-intrinsic factor Ab  -PT/OT  - vitals q6  - monitor respiratory status Acute on chronic lower extremity weakness over past 2 days, associated with spasms, hyperreflexia.  HIV myelopathy vs transverse myelitis vs other demyelinating disorder vs less likely AIDP   - Negative CT head  - No findings on CT lumbar and thoracic  - F/u B6, , HgbA1C, TSH, ESR, CRP, Mg, Cu, Zn,  ACE level, urine Ca++, HTLV, SHAI, MICHEAL, ANCA, CCP, MMA, anti-intrinsic factor Ab  - Folate, B12, homocysteine, syphilis WNL  -PT/OT  - vitals q6  - monitor respiratory status

## 2022-08-10 NOTE — PROVIDER CONTACT NOTE (OTHER) - ASSESSMENT
Pt refused lovenox sub q and pt is sleepy, so maybe I should hold bedtime seroquel and melatonin
Pt can barely lift left leg but it stable otherwise

## 2022-08-10 NOTE — PROVIDER CONTACT NOTE (OTHER) - RECOMMENDATIONS
Continue to monitor pt at this time
Hold seroquel and melatonin and  MD can speak to pt about the need for lovenox

## 2022-08-10 NOTE — PROGRESS NOTE ADULT - PROBLEM SELECTOR PLAN 3
- Last CD4 415 (3/22)  - HIV Viral load 5328 (3/22)  - Follows with Dr. Easton Hunter at [facility]  - F/u CD4  - C/w biktarvy - Last CD4 415 (3/22)  - HIV Viral load 5328 (3/22)  - Has not been adherent to medications  - F/u CD4, VL  - C/w biktarvy  - Sending genosure  - F/u ID recommendations

## 2022-08-10 NOTE — PHYSICAL THERAPY INITIAL EVALUATION ADULT - ADDITIONAL COMMENTS
Pt lives in a shelter on the second floor with 13 stairs to enter. prior to admission pt was independent with all mobility and ambulated without an assistive device. Pt does not own any medical equipment. Pt endorsed less than 5 falls within the last year.     Pt. left comfortable in bed, +bed alarm, call bell in reach and in NAD.

## 2022-08-10 NOTE — CONSULT NOTE ADULT - ATTENDING COMMENTS
32M with asthma, congenital HIV infection, HIV myelopathy, intermittently adherent to ARV who was admitted 8/9 c/o acute on chronic worsening lower extremity weakness starting 2 days PTA.  He also has urinary retention and incontinence and fecal incontinence. He denies any saddle paresthesias. He has no recent viral, flu-like illness associated with fever, muscle aches, cough, sore throat or diarrhea in the past few months. He has no recent physical trauma.      HIV  - continue Biktarvy  - f/u Tcells and VL  - would send genosure archive  - will have  from ID office reach out to patient and get him back in care  - patient prefers to see Dr. Hunter when he returns    Mental Health  - would have behavioral health see patient
Four years of weakness, numbness, decreased sensation, pain, spasms, stiffness in the legs and B/B changes which fluctuate in intensity.  He comes in now with no new symptoms but worsening of chronic symptoms identical to previous admissions. He had an influenza vaccination ?a month before the symptoms started.   Gabapentin, Lyrica help his pain.  Baclofen helps the spasms.     Exam:  Hyperreflexia, B Babinski. Spasticity in legs. Motor exam limited by pain.     A/P  Mr. Garsia is a 31 yo man with a myelopathy which has been present for 4 years with fluctuations in severity of symptoms.   AIDS myelopathy is usually seen with end stage disease.   No need for imaging of the spine at this time - there are no new symptoms.   Increase baclofen from 10 to 15 mg TID  PT/OT  I agree with work up as above.   Thank you

## 2022-08-10 NOTE — PROVIDER CONTACT NOTE (OTHER) - SITUATION
Pt has b/l lower leg weakness but can barely lift up left leg upon neuro assessment
Pt refused lovenox sub q and pt is sleepy, so maybe I should hold bedtime seroquel and melatonin

## 2022-08-10 NOTE — ED ADULT TRIAGE NOTE - NS ED NURSE AMBULANCES
----- Message from Blair Aguiar sent at 8/10/2022 12:36 PM CDT -----  Contact: [phone]  Type:  Sooner Apoointment Request    Caller is requesting a sooner appointment.  Caller declined first available appointment listed below.  Caller will not accept being placed on the waitlist and is requesting a message be sent to doctor.  Name of Caller:Karyna   When is the first available appointment?09/22/022  Symptoms:blood pressure elevated  Would the patient rather a call back or a response via [facility]? Call back   Best Call Back Number:[phone]   Additional Information:       
[facility]

## 2022-08-10 NOTE — PHYSICAL THERAPY INITIAL EVALUATION ADULT - GROSSLY INTACT, SENSORY
pt endorsed occasional burning/numbness/tingling in bilateral LE, pt able to identify light touch sensation

## 2022-08-10 NOTE — PROVIDER CONTACT NOTE (OTHER) - ACTION/TREATMENT ORDERED:
Hold seroquel and melatonin
MD said that he will let the day team know as they are still here but to continue to monitor pt at this time

## 2022-08-10 NOTE — PROGRESS NOTE ADULT - SUBJECTIVE AND OBJECTIVE BOX
PROGRESS NOTE:   Authored by Karri Saldaña MD [id number]  Patient is a 32y old  Male who presents with a chief complaint of Lower extremity weakness starting 2 days ago, unable to walk (09 Aug 2022 16:29)      SUBJECTIVE / OVERNIGHT EVENTS: No complaints, no chest pain,  palpitations, dyspnea, abdominal pain. nausea, vomiting, diarrhea, blurry vision, dysuria, lightheadedness, dizziness.    ADDITIONAL REVIEW OF SYSTEMS:    MEDICATIONS  (STANDING):  baclofen 5 milliGRAM(s) Oral every 8 hours  bictegravir 50 mG/emtricitabine 200 mG/tenofovir alafenamide 25 mG (BIKTARVY) 1 Tablet(s) Oral daily  enoxaparin Injectable 40 milliGRAM(s) SubCutaneous every 24 hours  gabapentin 600 milliGRAM(s) Oral three times a day  melatonin 6 milliGRAM(s) Oral at bedtime  QUEtiapine 100 milliGRAM(s) Oral once  QUEtiapine 200 milliGRAM(s) Oral at bedtime    MEDICATIONS  (PRN):  acetaminophen     Tablet .. 650 milliGRAM(s) Oral every 6 hours PRN Temp greater or equal to 38C (100.4F), Mild Pain (1 - 3)  aluminum hydroxide/magnesium hydroxide/simethicone Suspension 30 milliLiter(s) Oral every 4 hours PRN Dyspepsia  melatonin 3 milliGRAM(s) Oral at bedtime PRN Insomnia  ondansetron Injectable 4 milliGRAM(s) IV Push every 8 hours PRN Nausea and/or Vomiting      CAPILLARY BLOOD GLUCOSE        I&O's Summary      PHYSICAL EXAM:  Vital Signs Last 24 Hrs  T(C): 36.3 (10 Aug 2022 05:35), Max: 36.6 (09 Aug 2022 22:07)  T(F): 97.3 (10 Aug 2022 05:35), Max: 97.8 (09 Aug 2022 22:07)  HR: 65 (10 Aug 2022 05:35) (60 - 75)  BP: 108/65 (10 Aug 2022 05:35) (101/68 - 122/80)  BP(mean): --  RR: 18 (10 Aug 2022 05:35) (16 - 18)  SpO2: 100% (10 Aug 2022 05:35) (100% - 100%)    Parameters below as of 10 Aug 2022 05:35  Patient On (Oxygen Delivery Method): room air        GENERAL: No acute distress, well-developed  HEAD:  Atraumatic, Normocephalic  EYES: EOMI, PERRLA, conjunctiva and sclera clear  NECK: Supple, no lymphadenopathy, no JVD  CHEST/LUNG: CTAB; No wheezes, rales, or rhonchi  HEART: Regular rate and rhythm; No murmurs, rubs, or gallops  ABDOMEN: Soft, non-tender, non-distended; normal bowel sounds, no organomegaly  EXTREMITIES:  2+ peripheral pulses b/l, No clubbing, cyanosis, or edema  NEUROLOGY: A&O x 3, no focal deficits  SKIN: No rashes or lesions    LABS:                        13.3   2.80  )-----------( 158      ( 10 Aug 2022 05:00 )             40.6     08-10    139  |  105  |  6<L>  ----------------------------<  83  3.9   |  22  |  0.72    Ca    8.7      10 Aug 2022 05:00  Phos  2.9     08-10  Mg     1.40     08-10    TPro  7.8  /  Alb  3.9  /  TBili  0.5  /  DBili  x   /  AST  47<H>  /  ALT  24  /  AlkPhos  70  08-09          Urinalysis Basic - ( 09 Aug 2022 04:30 )    Color: Yellow / Appearance: Clear / S.031 / pH: x  Gluc: x / Ketone: Trace  / Bili: Small / Urobili: 6 mg/dL   Blood: x / Protein: 30 mg/dL / Nitrite: Negative   Leuk Esterase: Negative / RBC: 2 /HPF / WBC 4 /HPF   Sq Epi: x / Non Sq Epi: 2 /HPF / Bacteria: Negative          RADIOLOGY & ADDITIONAL TESTS:  Results Reviewed:   Imaging Personally Reviewed:  Electrocardiogram Personally Reviewed:    COORDINATION OF CARE:  Care Discussed with Consultants/Other Providers [Y/N]:  Prior or Outpatient Records Reviewed [Y/N]:   PROGRESS NOTE:   Authored by Karri Saldaña MD [id number]  Patient is a 32y old  Male who presents with a chief complaint of Lower extremity weakness starting 2 days ago, unable to walk (09 Aug 2022 16:29)      SUBJECTIVE / OVERNIGHT EVENTS: Pt was to go for MRI yesterday however he refused the procedure because he wanted anesthesia. Spoke with neurology regarding MRI of thoracic and lumbar spine, they believe given he is presenting with similar symptoms at prior admissions in January and of last year, that this is a chronic progression of his HIV myelopathy. Per their note they do not recommend further imaging. He has had back pain and fecal and urinary incontinence for months, and difficulty with walking for over a year. He has had past MRIs done and lumbar punctures performed for the same presenting complaints in both the [facility] and at [place] that showed no abnormalities, consistent with HIV myelopathy.    Discussed plan with patient at bedside and he understands that it is a progression of his disease and that the appropriate treatment is management of HIV and physical therapy.     ADDITIONAL REVIEW OF SYSTEMS:    MEDICATIONS  (STANDING):  baclofen 5 milliGRAM(s) Oral every 8 hours  bictegravir 50 mG/emtricitabine 200 mG/tenofovir alafenamide 25 mG (BIKTARVY) 1 Tablet(s) Oral daily  enoxaparin Injectable 40 milliGRAM(s) SubCutaneous every 24 hours  gabapentin 600 milliGRAM(s) Oral three times a day  melatonin 6 milliGRAM(s) Oral at bedtime  QUEtiapine 100 milliGRAM(s) Oral once  QUEtiapine 200 milliGRAM(s) Oral at bedtime    MEDICATIONS  (PRN):  acetaminophen     Tablet .. 650 milliGRAM(s) Oral every 6 hours PRN Temp greater or equal to 38C (100.4F), Mild Pain (1 - 3)  aluminum hydroxide/magnesium hydroxide/simethicone Suspension 30 milliLiter(s) Oral every 4 hours PRN Dyspepsia  melatonin 3 milliGRAM(s) Oral at bedtime PRN Insomnia  ondansetron Injectable 4 milliGRAM(s) IV Push every 8 hours PRN Nausea and/or Vomiting      CAPILLARY BLOOD GLUCOSE        I&O's Summary      PHYSICAL EXAM:  Vital Signs Last 24 Hrs  T(C): 36.3 (10 Aug 2022 05:35), Max: 36.6 (09 Aug 2022 22:07)  T(F): 97.3 (10 Aug 2022 05:35), Max: 97.8 (09 Aug 2022 22:07)  HR: 65 (10 Aug 2022 05:35) (60 - 75)  BP: 108/65 (10 Aug 2022 05:35) (101/68 - 122/80)  BP(mean): --  RR: 18 (10 Aug 2022 05:35) (16 - 18)  SpO2: 100% (10 Aug 2022 05:35) (100% - 100%)    Parameters below as of 10 Aug 2022 05:35  Patient On (Oxygen Delivery Method): room air        GENERAL: No acute distress, well-developed  HEAD:  Atraumatic, Normocephalic  EYES: EOMI, PERRLA, conjunctiva and sclera clear  NECK: Supple, no lymphadenopathy, no JVD  CHEST/LUNG: CTAB; No wheezes, rales, or rhonchi  HEART: Regular rate and rhythm; No murmurs, rubs, or gallops  ABDOMEN: Soft, non-tender, non-distended; normal bowel sounds, no organomegaly  EXTREMITIES:  2+ peripheral pulses b/l, No clubbing, cyanosis, or edema  NEUROLOGY: A&O x 3,   	H-Flex	H-Extension	K-Ext	D-Flex	P-Flex  R	3	4		3+	3+	3+	 	   L	3	4		3+	3+	3+		       Reflexes:              Biceps(C5)       BR(C6)     Triceps(C7)               Patellar(L4)    Achilles(S1)     R	2	          2	             2		        3		    3  L	2	          2	             2		        3		    3  SKIN: No rashes or lesions    LABS:                        13.3   2.80  )-----------( 158      ( 10 Aug 2022 05:00 )             40.6     08-10    139  |  105  |  6<L>  ----------------------------<  83  3.9   |  22  |  0.72    Ca    8.7      10 Aug 2022 05:00  Phos  2.9     08-10  Mg     1.40     08-10    TPro  7.8  /  Alb  3.9  /  TBili  0.5  /  DBili  x   /  AST  47<H>  /  ALT  24  /  AlkPhos  70  08-09          Urinalysis Basic - ( 09 Aug 2022 04:30 )    Color: Yellow / Appearance: Clear / S.031 / pH: x  Gluc: x / Ketone: Trace  / Bili: Small / Urobili: 6 mg/dL   Blood: x / Protein: 30 mg/dL / Nitrite: Negative   Leuk Esterase: Negative / RBC: 2 /HPF / WBC 4 /HPF   Sq Epi: x / Non Sq Epi: 2 /HPF / Bacteria: Negative          RADIOLOGY & ADDITIONAL TESTS:  Results Reviewed:   Imaging Personally Reviewed:  Electrocardiogram Personally Reviewed:    COORDINATION OF CARE:  Care Discussed with Consultants/Other Providers [Y/N]:  Prior or Outpatient Records Reviewed [Y/N]:

## 2022-08-10 NOTE — PROGRESS NOTE ADULT - PROBLEM SELECTOR PLAN 2
- c/w gabapentin 600 mg TID  - f/u [facility] --> lyrica 50 mg BID  - c/w baclofen 5 mg TID (10 mg home dose) -  lyrica 50 mg IID  - c/w baclofen 10 mg TID

## 2022-08-11 DIAGNOSIS — F60.3 BORDERLINE PERSONALITY DISORDER: ICD-10-CM

## 2022-08-11 LAB
ACE SERPL-CCNC: 64 U/L — SIGNIFICANT CHANGE UP (ref 14–82)
ANION GAP SERPL CALC-SCNC: 11 MMOL/L — SIGNIFICANT CHANGE UP (ref 7–14)
BASOPHILS # BLD AUTO: 0.01 K/UL — SIGNIFICANT CHANGE UP (ref 0–0.2)
BASOPHILS NFR BLD AUTO: 0.4 % — SIGNIFICANT CHANGE UP (ref 0–2)
BUN SERPL-MCNC: 7 MG/DL — SIGNIFICANT CHANGE UP (ref 7–23)
CALCIUM SERPL-MCNC: 8.4 MG/DL — SIGNIFICANT CHANGE UP (ref 8.4–10.5)
CCP IGG SERPL-ACNC: <8 UNITS — SIGNIFICANT CHANGE UP
CHLORIDE SERPL-SCNC: 105 MMOL/L — SIGNIFICANT CHANGE UP (ref 98–107)
CO2 SERPL-SCNC: 23 MMOL/L — SIGNIFICANT CHANGE UP (ref 22–31)
CREAT SERPL-MCNC: 0.79 MG/DL — SIGNIFICANT CHANGE UP (ref 0.5–1.3)
EGFR: 120 ML/MIN/1.73M2 — SIGNIFICANT CHANGE UP
EOSINOPHIL # BLD AUTO: 0.03 K/UL — SIGNIFICANT CHANGE UP (ref 0–0.5)
EOSINOPHIL NFR BLD AUTO: 1.2 % — SIGNIFICANT CHANGE UP (ref 0–6)
GLUCOSE SERPL-MCNC: 103 MG/DL — HIGH (ref 70–99)
HCT VFR BLD CALC: 38.7 % — LOW (ref 39–50)
HCV AB S/CO SERPL IA: 0.73 S/CO — SIGNIFICANT CHANGE UP (ref 0–0.99)
HCV AB SERPL-IMP: SIGNIFICANT CHANGE UP
HGB BLD-MCNC: 12.2 G/DL — LOW (ref 13–17)
IANC: 0.41 K/UL — LOW (ref 1.8–7.4)
IMM GRANULOCYTES NFR BLD AUTO: 0.4 % — SIGNIFICANT CHANGE UP (ref 0–1.5)
LYMPHOCYTES # BLD AUTO: 1.79 K/UL — SIGNIFICANT CHANGE UP (ref 1–3.3)
LYMPHOCYTES # BLD AUTO: 71.9 % — HIGH (ref 13–44)
MAGNESIUM SERPL-MCNC: 1.8 MG/DL — SIGNIFICANT CHANGE UP (ref 1.6–2.6)
MCHC RBC-ENTMCNC: 28.2 PG — SIGNIFICANT CHANGE UP (ref 27–34)
MCHC RBC-ENTMCNC: 31.5 GM/DL — LOW (ref 32–36)
MCV RBC AUTO: 89.6 FL — SIGNIFICANT CHANGE UP (ref 80–100)
MONOCYTES # BLD AUTO: 0.24 K/UL — SIGNIFICANT CHANGE UP (ref 0–0.9)
MONOCYTES NFR BLD AUTO: 9.6 % — SIGNIFICANT CHANGE UP (ref 2–14)
NEUTROPHILS # BLD AUTO: 0.41 K/UL — LOW (ref 1.8–7.4)
NEUTROPHILS NFR BLD AUTO: 16.5 % — LOW (ref 43–77)
NRBC # BLD: 0 /100 WBCS — SIGNIFICANT CHANGE UP
NRBC # FLD: 0 K/UL — SIGNIFICANT CHANGE UP
PHOSPHATE SERPL-MCNC: 3.1 MG/DL — SIGNIFICANT CHANGE UP (ref 2.5–4.5)
PLATELET # BLD AUTO: 178 K/UL — SIGNIFICANT CHANGE UP (ref 150–400)
POTASSIUM SERPL-MCNC: 3.6 MMOL/L — SIGNIFICANT CHANGE UP (ref 3.5–5.3)
POTASSIUM SERPL-SCNC: 3.6 MMOL/L — SIGNIFICANT CHANGE UP (ref 3.5–5.3)
RBC # BLD: 4.32 M/UL — SIGNIFICANT CHANGE UP (ref 4.2–5.8)
RBC # FLD: 13.5 % — SIGNIFICANT CHANGE UP (ref 10.3–14.5)
RF+CCP IGG SER-IMP: NEGATIVE — SIGNIFICANT CHANGE UP
SODIUM SERPL-SCNC: 139 MMOL/L — SIGNIFICANT CHANGE UP (ref 135–145)
WBC # BLD: 2.49 K/UL — LOW (ref 3.8–10.5)
WBC # FLD AUTO: 2.49 K/UL — LOW (ref 3.8–10.5)

## 2022-08-11 PROCEDURE — 99232 SBSQ HOSP IP/OBS MODERATE 35: CPT | Mod: GC

## 2022-08-11 PROCEDURE — 99232 SBSQ HOSP IP/OBS MODERATE 35: CPT

## 2022-08-11 RX ORDER — BACLOFEN 100 %
20 POWDER (GRAM) MISCELLANEOUS EVERY 8 HOURS
Refills: 0 | Status: DISCONTINUED | OUTPATIENT
Start: 2022-08-11 | End: 2022-08-11

## 2022-08-11 RX ORDER — BACLOFEN 100 %
20 POWDER (GRAM) MISCELLANEOUS EVERY 8 HOURS
Refills: 0 | Status: DISCONTINUED | OUTPATIENT
Start: 2022-08-11 | End: 2022-08-16

## 2022-08-11 RX ORDER — ACETAMINOPHEN 500 MG
1000 TABLET ORAL ONCE
Refills: 0 | Status: COMPLETED | OUTPATIENT
Start: 2022-08-11 | End: 2022-08-11

## 2022-08-11 RX ADMIN — BICTEGRAVIR SODIUM, EMTRICITABINE, AND TENOFOVIR ALAFENAMIDE FUMARATE 1 TABLET(S): 30; 120; 15 TABLET ORAL at 13:00

## 2022-08-11 RX ADMIN — Medication 10 MILLIGRAM(S): at 13:00

## 2022-08-11 RX ADMIN — Medication 650 MILLIGRAM(S): at 08:13

## 2022-08-11 RX ADMIN — Medication 650 MILLIGRAM(S): at 07:13

## 2022-08-11 RX ADMIN — Medication 20 MILLIGRAM(S): at 22:47

## 2022-08-11 RX ADMIN — Medication 400 MILLIGRAM(S): at 16:14

## 2022-08-11 RX ADMIN — QUETIAPINE FUMARATE 200 MILLIGRAM(S): 200 TABLET, FILM COATED ORAL at 22:45

## 2022-08-11 RX ADMIN — Medication 50 MILLIGRAM(S): at 07:13

## 2022-08-11 RX ADMIN — Medication 50 MILLIGRAM(S): at 22:44

## 2022-08-11 RX ADMIN — Medication 1000 MILLIGRAM(S): at 16:24

## 2022-08-11 RX ADMIN — Medication 6 MILLIGRAM(S): at 22:45

## 2022-08-11 RX ADMIN — Medication 650 MILLIGRAM(S): at 22:44

## 2022-08-11 RX ADMIN — Medication 10 MILLIGRAM(S): at 07:14

## 2022-08-11 RX ADMIN — Medication 50 MILLIGRAM(S): at 13:00

## 2022-08-11 NOTE — CHART NOTE - NSCHARTNOTEFT_GEN_A_CORE
Neurology team discussed with attending regarding additional medications to address spasticity and pain in the legs.  For now, would increase baclofen to 20 mg TID and monitor for sedation.  Will continue to follow    Discussed with general neurology attending Dr. Cristel Alfredo  Neurology PGY3 Neurology team discussed with attending regarding additional medications to address spasticity and pain in the legs.  For now, would increase baclofen to 20 mg TID and monitor for sedation.  Will continue to follow    Discussed with general neurology attending Dr. Cristel Alfredo  Neurology PGY3    Thank you

## 2022-08-11 NOTE — PROGRESS NOTE ADULT - ATTENDING COMMENTS
Mr. Garsia is a 31 yo M w/ PMH of HIV and HIV myelopathy who p/w 3 days of worsening of his chronic bilateral lower extremity weakness, associated with bowel and bladder symptoms. In ED, CT TL spine and CT head are unremarkable. 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   -neuro consult appreciated - pt has had similar episodes in past for which he's undergone extensive work-up including LP, imaging. Reported h/o GBS but was not confirmed. Pt's symptoms are most likely progression of myelopathy.  - for spasticity - increase baclofen to 20mg BID, monitor for sedation.  - physical therapy evaluation appreciated - recommends rehab which patient amenable to  - c/w biktarvy for HIV, he hasn't taken meds for more than 1 month now; ID f/u and HIV team outreach appreciated; f/u T cells, viral load, check genosure archive; will need to f/u with Dr. Hunter as outpt.  - d/c planning to [facility] when bed available  - d/w patient at bedside Mr. Garsia is a 33 yo M w/ PMH of HIV and HIV myelopathy who p/w 3 days of worsening of his chronic bilateral lower extremity weakness, associated with bowel and bladder symptoms. In ED, CT TL spine and CT head are unremarkable. 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   -neuro consult appreciated - pt has had similar episodes in past for which he's undergone extensive work-up including LP, imaging. Reported h/o GBS but was not confirmed. Pt's symptoms are most likely progression of myelopathy.  - for spasticity - increase baclofen to 20mg BID, monitor for sedation.  - physical therapy evaluation appreciated - recommends rehab which patient amenable to  - c/w biktarvy for HIV, he hasn't taken meds for more than 1 month now; ID f/u and HIV team outreach appreciated; f/u T cells, viral load, check genosure archive; will need to f/u with Dr. Hunter as outpt.  - hypomagnesemia - repleted with mag sulfate and now resolved  - d/c planning to [facility] when bed available  - d/w patient at bedside

## 2022-08-11 NOTE — PROGRESS NOTE ADULT - SUBJECTIVE AND OBJECTIVE BOX
Patient is a 32y old  Male who presents with a chief complaint of Lower extremity weakness starting 2 days ago, unable to walk (10 Aug 2022 06:50)    f/u HIV    Interval History/ROS:  no fever.  weakness a little better.  was able to walk overnight.  no n/v/d.  no abdominal pain.  no dysuria.  Remainder of ROS otherwise negative.    PAST MEDICAL & SURGICAL HISTORY:  HIV (human immunodeficiency virus infection)  Asthma  Closed fracture of multiple ribs of right side, initial encounter  Cocaine abuse  Chronic sinusitis  Homeless  History of orthopedic surgery left arm    Allergies  Toradol (Anaphylaxis; Swelling)    ANTIMICROBIALS:  bictegravir 50 mG/emtricitabine 200 mG/tenofovir alafenamide 25 mG (BIKTARVY) 1 daily    MEDICATIONS  (STANDING):  baclofen 10 every 8 hours  enoxaparin Injectable 40 every 24 hours  melatonin 6 at bedtime  pregabalin 50 three times a day  QUEtiapine 100 once  QUEtiapine 200 at bedtime    Vital Signs Last 24 Hrs  T(F): 97.8 (08-10-22 @ 21:20), Max: 98.3 (08-10-22 @ 15:12)  HR: 65 (08-10-22 @ 21:20)  BP: 118/79 (08-10-22 @ 21:20)  RR: 18 (08-10-22 @ 21:20)  SpO2: 100% (08-10-22 @ 21:20) (100% - 100%)    PHYSICAL EXAM:  Constitutional: non-toxic  HEAD/EYES: anicteric  ENT:  supple  Cardiovascular:   not tachycardic  Respiratory:  not tachypneic  GI:  soft,  :  no gonzales  Musculoskeletal:  no synovitis  Neurologic: awake and alert, weakness  Skin:  no rash  Psychiatric:  awake, alert, appropriate mood                        12.2   2.49  )-----------( 178      ( 11 Aug 2022 05:40 )             38.7 08-11    139  |  105  |  7   ----------------------------<  103  3.6   |  23  |  0.79  Ca    8.4      11 Aug 2022 05:40Phos  3.1     08-11Mg     1.80     08-11    Urinalysis Basic - ( 09 Aug 2022 04:30 )  Color: Yellow / Appearance: Clear / S.031 / pH: x  Gluc: x / Ketone: Trace  / Bili: Small / Urobili: 6 mg/dL   Blood: x / Protein: 30 mg/dL / Nitrite: Negative   Leuk Esterase: Negative / RBC: 2 /HPF / WBC 4 /HPF   Sq Epi: x / Non Sq Epi: 2 /HPF / Bacteria: Negative    CD4 %: 21 (22 @ 04:00)  ABS CD4: 415 (22 @ 04:00)  HIV-1 Viral Load Result: DET. (22 @ 00:44)  HIV-1 RNA Quantitative, Viral Load: 5,328 (22 @ 00:44)    MICROBIOLOGY:  Culture - Urine (collected 09 Aug 2022 04:50)  Source: Clean Catch Clean Catch (Midstream)  Final Report (10 Aug 2022 14:13):    >=3 organisms. Probable collection contamination.    Rapid RVP Result: NotDetec ( @ 04:13)    RADIOLOGY:  imaging below personally reviewed and agree with findings    CT Lumbar Spine w/ IV Cont (22 @ 08:59) >  IMPRESSION: Unremarkable CT scan of the thoracic and lumbar spine.    If symptoms continue and a mass is still clinically suspected contrast   enhanced MRI is recommended if there are no contraindications.    CT Lumbar Spine No Cont (22 @ 09:52) >  IMPRESSION:  Unremarkable CT scan of the lumbar spine.   No vertebral fracture is recognized.    MR Thoracic Spine w/wo IV Cont (10.26.20 @ 10:24) >  IMPRESSION: Degenerative changes as described above.  No abnormal masses or collections seen.

## 2022-08-11 NOTE — OCCUPATIONAL THERAPY INITIAL EVALUATION ADULT - MD ORDER
Occupational Therapy (OT) to evaluate and treat. Ambulate as Tolerated. Per ANDREA Watkins, pt is okay to participate in OT evaluation and perform activity as tolerated.

## 2022-08-11 NOTE — PROGRESS NOTE ADULT - PROBLEM SELECTOR PLAN 5
Diet: regular  DVT Prophylaxis: lovenox 40 mg PO QD Diet: regular  DVT Prophylaxis: lovenox 40 mg PO QD  Dispo: rehab    Waiting on OT. Diet: regular  DVT Prophylaxis: lovenox 40 mg PO QD  Dispo: rehab. HIV outreach program on board.     Waiting on OT.

## 2022-08-11 NOTE — OCCUPATIONAL THERAPY INITIAL EVALUATION ADULT - DIAGNOSIS, OT EVAL
Progression of chronic myelopathy; Hx of HIV Myelopathy; Decreased functional mobility; Decreased ADL management

## 2022-08-11 NOTE — SBIRT NOTE ADULT - NSSBIRTDRGBRIEFINTDET_GEN_A_CORE
Pt stated that he uses marijuana but not on a regular basis. SW educated pt on the risks of continuos marijuana use. Pt expressed understanding. Resources were provided to pt.

## 2022-08-11 NOTE — OCCUPATIONAL THERAPY INITIAL EVALUATION ADULT - GENERAL OBSERVATIONS, REHAB EVAL
Pt. received semisupine in bed. No acute distress. Patient agreed to evaluation from Occupational Therapist. +Cielo.

## 2022-08-11 NOTE — PROGRESS NOTE ADULT - SUBJECTIVE AND OBJECTIVE BOX
DATE OF SERVICE: 08-11-22 @ 06:50    Patient is a 32y old  Male who presents with a chief complaint of Lower extremity weakness starting 2 days ago, unable to walk (10 Aug 2022 14:58)      SUBJECTIVE / OVERNIGHT EVENTS:    MEDICATIONS  (STANDING):  baclofen 10 milliGRAM(s) Oral every 8 hours  bictegravir 50 mG/emtricitabine 200 mG/tenofovir alafenamide 25 mG (BIKTARVY) 1 Tablet(s) Oral daily  enoxaparin Injectable 40 milliGRAM(s) SubCutaneous every 24 hours  melatonin 6 milliGRAM(s) Oral at bedtime  pregabalin 50 milliGRAM(s) Oral three times a day  QUEtiapine 100 milliGRAM(s) Oral once  QUEtiapine 200 milliGRAM(s) Oral at bedtime    MEDICATIONS  (PRN):  acetaminophen     Tablet .. 650 milliGRAM(s) Oral every 6 hours PRN Temp greater or equal to 38C (100.4F), Mild Pain (1 - 3)  aluminum hydroxide/magnesium hydroxide/simethicone Suspension 30 milliLiter(s) Oral every 4 hours PRN Dyspepsia  melatonin 3 milliGRAM(s) Oral at bedtime PRN Insomnia  ondansetron Injectable 4 milliGRAM(s) IV Push every 8 hours PRN Nausea and/or Vomiting      Vital Signs Last 24 Hrs  T(C): 36.6 (10 Aug 2022 21:20), Max: 36.8 (10 Aug 2022 15:12)  T(F): 97.8 (10 Aug 2022 21:20), Max: 98.3 (10 Aug 2022 15:12)  HR: 65 (10 Aug 2022 21:20) (65 - 73)  BP: 118/79 (10 Aug 2022 21:20) (118/79 - 130/90)  BP(mean): --  RR: 18 (10 Aug 2022 21:20) (18 - 18)  SpO2: 100% (10 Aug 2022 21:20) (100% - 100%)    Parameters below as of 10 Aug 2022 21:20  Patient On (Oxygen Delivery Method): room air      CAPILLARY BLOOD GLUCOSE        I&O's Summary      PHYSICAL EXAM:  GENERAL: No acute distress, well-developed  HEAD:  Atraumatic, Normocephalic  EYES: EOMI, PERRLA, conjunctiva and sclera clear  NECK: Supple, no lymphadenopathy, no JVD  CHEST/LUNG: CTAB; No wheezes, rales, or rhonchi  HEART: Regular rate and rhythm; No murmurs, rubs, or gallops  ABDOMEN: Soft, non-tender, non-distended; normal bowel sounds, no organomegaly  EXTREMITIES:  2+ peripheral pulses b/l, No clubbing, cyanosis, or edema  NEUROLOGY: A&O x 3,   	H-Flex	H-Extension	K-Ext	D-Flex	P-Flex  R	3	4		3+	3+	3+	 	   L	3	4		3+	3+	3+		       Reflexes:              Biceps(C5)       BR(C6)     Triceps(C7)               Patellar(L4)    Achilles(S1)     R	2	          2	             2		        3		    3  L	2	          2	             2		        3		    3  SKIN: No rashes or lesions      LABS:                        13.3   2.80  )-----------( 158      ( 10 Aug 2022 05:00 )             40.6     08-10    139  |  105  |  6<L>  ----------------------------<  83  3.9   |  22  |  0.72    Ca    8.7      10 Aug 2022 05:00  Phos  2.9     08-10  Mg     1.40     08-10                RADIOLOGY & ADDITIONAL TESTS:    Imaging Personally Reviewed:    Consultant(s) Notes Reviewed:      Care Discussed with Consultants/Other Providers:   DATE OF SERVICE: 08-11-22 @ 06:50    Patient is a 32y old  Male who presents with a chief complaint of Lower extremity weakness starting 2 days ago, unable to walk (10 Aug 2022 14:58)      SUBJECTIVE / OVERNIGHT EVENTS: NAEO. Patient states he feels the same as yesterday. He has noticed increased sensitivity of his skin but notes that this is not new and is intermittent. No CP, SOB, N/V, diarrhea, incontinence to stool or urine.     MEDICATIONS  (STANDING):  baclofen 10 milliGRAM(s) Oral every 8 hours  bictegravir 50 mG/emtricitabine 200 mG/tenofovir alafenamide 25 mG (BIKTARVY) 1 Tablet(s) Oral daily  enoxaparin Injectable 40 milliGRAM(s) SubCutaneous every 24 hours  melatonin 6 milliGRAM(s) Oral at bedtime  pregabalin 50 milliGRAM(s) Oral three times a day  QUEtiapine 100 milliGRAM(s) Oral once  QUEtiapine 200 milliGRAM(s) Oral at bedtime    MEDICATIONS  (PRN):  acetaminophen     Tablet .. 650 milliGRAM(s) Oral every 6 hours PRN Temp greater or equal to 38C (100.4F), Mild Pain (1 - 3)  aluminum hydroxide/magnesium hydroxide/simethicone Suspension 30 milliLiter(s) Oral every 4 hours PRN Dyspepsia  melatonin 3 milliGRAM(s) Oral at bedtime PRN Insomnia  ondansetron Injectable 4 milliGRAM(s) IV Push every 8 hours PRN Nausea and/or Vomiting      Vital Signs Last 24 Hrs  T(C): 36.6 (10 Aug 2022 21:20), Max: 36.8 (10 Aug 2022 15:12)  T(F): 97.8 (10 Aug 2022 21:20), Max: 98.3 (10 Aug 2022 15:12)  HR: 65 (10 Aug 2022 21:20) (65 - 73)  BP: 118/79 (10 Aug 2022 21:20) (118/79 - 130/90)  BP(mean): --  RR: 18 (10 Aug 2022 21:20) (18 - 18)  SpO2: 100% (10 Aug 2022 21:20) (100% - 100%)    Parameters below as of 10 Aug 2022 21:20  Patient On (Oxygen Delivery Method): room air      CAPILLARY BLOOD GLUCOSE        I&O's Summary      PHYSICAL EXAM:  GENERAL: No acute distress, well-developed.   HEAD:  Atraumatic, Normocephalic  EYES: EOMI, PERRLA, conjunctiva pin, sclera white  NECK: Supple, no lymphadenopathy, no JVD  CHEST/LUNG: CTAB; No wheezes, rales, or rhonchi  HEART: Regular rate and rhythm; No murmurs, rubs, or gallops  ABDOMEN: Soft, non-tender, non-distended; normal bowel sounds, no organomegaly  EXTREMITIES:  2+ peripheral pulses b/l, No clubbing, cyanosis, or edema  NEUROLOGY: A&O x 3, Able to rotate himself in bed from right lateral decub to supine. 5/5 strength UE b/l.   	H-Flex	H-Extension	K-Ext	D-Flex	P-Flex  R	2	3		3	3+	3+	 	   L	2	3		3	3	3		       Reflexes:              Biceps(C5)       BR(C6)     Triceps(C7)               Patellar(L4)    Achilles(S1)     R	2	          2	             2		        3		    3  L	2	          2	             2		        3		    3  SKIN: No rashes or lesions      LABS:                        13.3   2.80  )-----------( 158      ( 10 Aug 2022 05:00 )             40.6     08-10    139  |  105  |  6<L>  ----------------------------<  83  3.9   |  22  |  0.72    Ca    8.7      10 Aug 2022 05:00  Phos  2.9     08-10  Mg     1.40     08-10                RADIOLOGY & ADDITIONAL TESTS:    Imaging Personally Reviewed:    Consultant(s) Notes Reviewed:      Care Discussed with Consultants/Other Providers:

## 2022-08-11 NOTE — OCCUPATIONAL THERAPY INITIAL EVALUATION ADULT - RLE MMT, REHAB EVAL
Grossly 3-/5 throughout I have personally performed a face to face diagnostic evaluation on this patient. I have reviewed the ACP note and agree with the history, exam and plan of care, except as noted.

## 2022-08-11 NOTE — OCCUPATIONAL THERAPY INITIAL EVALUATION ADULT - LIVES WITH, PROFILE
Pt. reports he lives with two roommates in a room within a Shelter with no steps to enter. Once inside, pt. reports he has a full flight of steps to negotiate to reach 2nd floor where room is located. Per pt., he has a bathtub in his bathroom.

## 2022-08-11 NOTE — PROGRESS NOTE ADULT - PROBLEM SELECTOR PLAN 3
- Last CD4 415 (3/22)  - HIV Viral load 5328 (3/22)  - Has not been adherent to medications  - F/u CD4, VL  - C/w biktarvy  - Sending genosure  - F/u ID recommendations - Last CD4 415 (3/22)  - HIV Viral load 5328 (3/22)  - Has not been adherent to medications  - F/u CD4, VL, HTLV 1/2, quant gold, Hep C  - Trend daily WBC, temp, LFTs, creat  - C/w biktarvy  - Sending genosure  - F/u ID recommendations - Last CD4 415 (3/22)  - HIV Viral load 5328 (3/22)  - Has not been adherent to medications  - F/u CD4, VL, HTLV 1/2, quant gold, Hep C  - Trend daily WBC, temp, LFTs, creat  - C/w biktarvy  - Sending genosure  - F/u ID recommendations    Appreciate HIV outreach program seeing the patient this morning. Patient states that he would not like to see Psychiatry anymore as he was able to talk to the individuals from the HIV outreach program.

## 2022-08-11 NOTE — PROGRESS NOTE ADULT - PROBLEM SELECTOR PLAN 1
Acute on chronic lower extremity weakness over past 2 days, associated with spasms, hyperreflexia.  HIV myelopathy vs transverse myelitis vs other demyelinating disorder vs less likely AIDP   - Negative CT head  - No findings on CT lumbar and thoracic  - F/u B6, , HgbA1C, TSH, ESR, CRP, Mg, Cu, Zn,  ACE level, urine Ca++, HTLV, SHAI, MICHEAL, ANCA, CCP, MMA, anti-intrinsic factor Ab  - Folate, B12, homocysteine, syphilis WNL  -PT/OT  - vitals q6  - monitor respiratory status Acute on chronic lower extremity weakness over past 2 days, associated with spasms, hyperreflexia.  HIV myelopathy vs transverse myelitis vs other demyelinating disorder vs less likely AIDP   - Negative CT head  - No findings on CT lumbar and thoracic  - F/u B6, , HgbA1C, TSH, ESR, CRP, Mg, Cu, Zn,  ACE level, urine Ca++, HTLV, SHAI, MICHEAL, ANCA, CCP, MMA, anti-intrinsic factor Ab  - Folate, B12, homocysteine, syphilis WNL  - PT/OT  - vitals q6  - monitor respiratory status  - f/u neuro recs Acute on chronic lower extremity weakness over past 2 days, associated with spasms, hyperreflexia.  HIV myelopathy vs transverse myelitis vs other demyelinating disorder vs less likely AIDP   - Negative CT head  - No findings on CT lumbar and thoracic  - F/u B6, Cu, Zn, ACE level, urine Ca++, HTLV, SHAI, MICHEAL, ANCA, CCP, MMA, anti-intrinsic factor Ab  - Folate, B12, homocysteine, syphilis, HgbA1c, TSH, ESR, CRP, Mg WNL  - PT/OT  - vitals q6  - monitor respiratory status  - f/u neuro recs

## 2022-08-11 NOTE — OCCUPATIONAL THERAPY INITIAL EVALUATION ADULT - PERTINENT HX OF CURRENT PROBLEM, REHAB EVAL
Pt is a 32 year old male with hx of HIV, HIV myelopathy, & asthma, who presented to [facility] 8/9/22 with acute on chronic worsening lower extremity weakness, associated with muscle spasms. CT Head No Contrast on 8/9/22 displayed no evidence of acute hemorrhage mass or mass effect. CT Thoracic/Lumbar Spine with IV Contrast on 8/9/22 displayed unremarkable CT scan of the thoracic and lumbar spine. Pt admitted with symptoms likely secondary to progression of chronic myelopathy in setting of non-compliance with HIV meds and poor follow up.

## 2022-08-11 NOTE — OCCUPATIONAL THERAPY INITIAL EVALUATION ADULT - EATING, PREVIOUS LEVEL OF FUNCTION, OT EVAL
Pt wasn't having any having cardiac problems, he just wants to request a stress test. He has made an office visit because he is also requesting labs for insurance purposes. Stress test has never been ordered.    independent

## 2022-08-12 LAB
4/8 RATIO: 0.15 RATIO — LOW (ref 0.9–3.6)
ABS CD8: 1412 /UL — HIGH (ref 142–740)
ANION GAP SERPL CALC-SCNC: 12 MMOL/L — SIGNIFICANT CHANGE UP (ref 7–14)
BASOPHILS # BLD AUTO: 0.01 K/UL — SIGNIFICANT CHANGE UP (ref 0–0.2)
BASOPHILS NFR BLD AUTO: 0.3 % — SIGNIFICANT CHANGE UP (ref 0–2)
BUN SERPL-MCNC: 7 MG/DL — SIGNIFICANT CHANGE UP (ref 7–23)
CALCIUM SERPL-MCNC: 8.8 MG/DL — SIGNIFICANT CHANGE UP (ref 8.4–10.5)
CD3 BLASTS SPEC-ACNC: 1697 /UL — SIGNIFICANT CHANGE UP (ref 672–1870)
CD3 BLASTS SPEC-ACNC: 89 % — HIGH (ref 59–83)
CD4 %: 11 % — LOW (ref 30–62)
CD8 %: 74 % — HIGH (ref 12–36)
CHLORIDE SERPL-SCNC: 107 MMOL/L — SIGNIFICANT CHANGE UP (ref 98–107)
CO2 SERPL-SCNC: 21 MMOL/L — LOW (ref 22–31)
COPPER SERPL-MCNC: 113 UG/DL — SIGNIFICANT CHANGE UP (ref 69–132)
CREAT SERPL-MCNC: 0.73 MG/DL — SIGNIFICANT CHANGE UP (ref 0.5–1.3)
EGFR: 123 ML/MIN/1.73M2 — SIGNIFICANT CHANGE UP
EOSINOPHIL # BLD AUTO: 0.04 K/UL — SIGNIFICANT CHANGE UP (ref 0–0.5)
EOSINOPHIL NFR BLD AUTO: 1.3 % — SIGNIFICANT CHANGE UP (ref 0–6)
GLUCOSE SERPL-MCNC: 98 MG/DL — SIGNIFICANT CHANGE UP (ref 70–99)
HCT VFR BLD CALC: 38.9 % — LOW (ref 39–50)
HGB BLD-MCNC: 12.5 G/DL — LOW (ref 13–17)
HIV-1 VIRAL LOAD RESULT: ABNORMAL
HIV1 RNA # SERPL NAA+PROBE: SIGNIFICANT CHANGE UP
HIV1 RNA SER-IMP: SIGNIFICANT CHANGE UP
HIV1 RNA SERPL NAA+PROBE-ACNC: ABNORMAL
HIV1 RNA SERPL NAA+PROBE-LOG#: 5.42 — SIGNIFICANT CHANGE UP
HTLV I+II AB PATRN SER RIPA-IMP: SIGNIFICANT CHANGE UP
IANC: 0.57 K/UL — LOW (ref 1.8–7.4)
IMM GRANULOCYTES NFR BLD AUTO: 0 % — SIGNIFICANT CHANGE UP (ref 0–1.5)
LYMPHOCYTES # BLD AUTO: 2.14 K/UL — SIGNIFICANT CHANGE UP (ref 1–3.3)
LYMPHOCYTES # BLD AUTO: 69.3 % — HIGH (ref 13–44)
MAGNESIUM SERPL-MCNC: 1.5 MG/DL — LOW (ref 1.6–2.6)
MCHC RBC-ENTMCNC: 28.8 PG — SIGNIFICANT CHANGE UP (ref 27–34)
MCHC RBC-ENTMCNC: 32.1 GM/DL — SIGNIFICANT CHANGE UP (ref 32–36)
MCV RBC AUTO: 89.6 FL — SIGNIFICANT CHANGE UP (ref 80–100)
MONOCYTES # BLD AUTO: 0.33 K/UL — SIGNIFICANT CHANGE UP (ref 0–0.9)
MONOCYTES NFR BLD AUTO: 10.7 % — SIGNIFICANT CHANGE UP (ref 2–14)
NEUTROPHILS # BLD AUTO: 0.57 K/UL — LOW (ref 1.8–7.4)
NEUTROPHILS NFR BLD AUTO: 18.4 % — LOW (ref 43–77)
NRBC # BLD: 0 /100 WBCS — SIGNIFICANT CHANGE UP
NRBC # FLD: 0 K/UL — SIGNIFICANT CHANGE UP
PHOSPHATE SERPL-MCNC: 2.7 MG/DL — SIGNIFICANT CHANGE UP (ref 2.5–4.5)
PLATELET # BLD AUTO: 179 K/UL — SIGNIFICANT CHANGE UP (ref 150–400)
POTASSIUM SERPL-MCNC: 3.9 MMOL/L — SIGNIFICANT CHANGE UP (ref 3.5–5.3)
POTASSIUM SERPL-SCNC: 3.9 MMOL/L — SIGNIFICANT CHANGE UP (ref 3.5–5.3)
RBC # BLD: 4.34 M/UL — SIGNIFICANT CHANGE UP (ref 4.2–5.8)
RBC # FLD: 13.4 % — SIGNIFICANT CHANGE UP (ref 10.3–14.5)
SARS-COV-2 RNA SPEC QL NAA+PROBE: SIGNIFICANT CHANGE UP
SODIUM SERPL-SCNC: 140 MMOL/L — SIGNIFICANT CHANGE UP (ref 135–145)
T-CELL CD4 SUBSET PNL BLD: 207 /UL — LOW (ref 489–1457)
WBC # BLD: 3.09 K/UL — LOW (ref 3.8–10.5)
WBC # FLD AUTO: 3.09 K/UL — LOW (ref 3.8–10.5)

## 2022-08-12 PROCEDURE — 99232 SBSQ HOSP IP/OBS MODERATE 35: CPT | Mod: GC

## 2022-08-12 RX ORDER — LIDOCAINE 4 G/100G
1 CREAM TOPICAL ONCE
Refills: 0 | Status: COMPLETED | OUTPATIENT
Start: 2022-08-12 | End: 2022-08-12

## 2022-08-12 RX ADMIN — Medication 50 MILLIGRAM(S): at 21:18

## 2022-08-12 RX ADMIN — BICTEGRAVIR SODIUM, EMTRICITABINE, AND TENOFOVIR ALAFENAMIDE FUMARATE 1 TABLET(S): 30; 120; 15 TABLET ORAL at 13:03

## 2022-08-12 RX ADMIN — LIDOCAINE 1 PATCH: 4 CREAM TOPICAL at 18:16

## 2022-08-12 RX ADMIN — Medication 20 MILLIGRAM(S): at 21:18

## 2022-08-12 RX ADMIN — Medication 50 MILLIGRAM(S): at 06:21

## 2022-08-12 RX ADMIN — Medication 20 MILLIGRAM(S): at 06:22

## 2022-08-12 RX ADMIN — LIDOCAINE 1 PATCH: 4 CREAM TOPICAL at 16:55

## 2022-08-12 RX ADMIN — QUETIAPINE FUMARATE 200 MILLIGRAM(S): 200 TABLET, FILM COATED ORAL at 21:18

## 2022-08-12 RX ADMIN — Medication 6 MILLIGRAM(S): at 21:18

## 2022-08-12 RX ADMIN — Medication 20 MILLIGRAM(S): at 13:04

## 2022-08-12 RX ADMIN — Medication 650 MILLIGRAM(S): at 06:22

## 2022-08-12 RX ADMIN — Medication 50 MILLIGRAM(S): at 13:04

## 2022-08-12 NOTE — PROGRESS NOTE ADULT - ATTENDING COMMENTS
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 Here with unremarkable CT head and t/l spine. Neurology consulted, recs appreciated. Symptoms suspected to be progression of HIV myelopathy. Continued generalized pain. Baclofen increased per Neuro. Attempting to spend some time in the chair today. Appreciate ID input regarding HIV, not compliant with meds. Encouraged med adherence, close outpatient ID f/u. Appreciate PT/OT recs, recommending rehab, patient agreeable. Awaiting placement.

## 2022-08-12 NOTE — PROGRESS NOTE ADULT - SUBJECTIVE AND OBJECTIVE BOX
PROGRESS NOTE:   Authored by Karri Saldaña MD [id number]  Patient is a 32y old  Male who presents with a chief complaint of Lower extremity weakness starting 2 days ago, unable to walk (11 Aug 2022 12:03)      SUBJECTIVE / OVERNIGHT EVENTS: No complaints, no chest pain,  palpitations, dyspnea, abdominal pain. nausea, vomiting, diarrhea, blurry vision, dysuria, lightheadedness, dizziness.    ADDITIONAL REVIEW OF SYSTEMS:    MEDICATIONS  (STANDING):  baclofen 20 milliGRAM(s) Oral every 8 hours  bictegravir 50 mG/emtricitabine 200 mG/tenofovir alafenamide 25 mG (BIKTARVY) 1 Tablet(s) Oral daily  enoxaparin Injectable 40 milliGRAM(s) SubCutaneous every 24 hours  melatonin 6 milliGRAM(s) Oral at bedtime  pregabalin 50 milliGRAM(s) Oral three times a day  QUEtiapine 100 milliGRAM(s) Oral once  QUEtiapine 200 milliGRAM(s) Oral at bedtime    MEDICATIONS  (PRN):  acetaminophen     Tablet .. 650 milliGRAM(s) Oral every 6 hours PRN Temp greater or equal to 38C (100.4F), Mild Pain (1 - 3)  aluminum hydroxide/magnesium hydroxide/simethicone Suspension 30 milliLiter(s) Oral every 4 hours PRN Dyspepsia  melatonin 3 milliGRAM(s) Oral at bedtime PRN Insomnia  ondansetron Injectable 4 milliGRAM(s) IV Push every 8 hours PRN Nausea and/or Vomiting      CAPILLARY BLOOD GLUCOSE        I&O's Summary    11 Aug 2022 07:01  -  12 Aug 2022 06:44  --------------------------------------------------------  IN: 0 mL / OUT: 950 mL / NET: -950 mL        PHYSICAL EXAM:  Vital Signs Last 24 Hrs  T(C): 36.4 (12 Aug 2022 06:24), Max: 36.7 (11 Aug 2022 12:29)  T(F): 97.5 (12 Aug 2022 06:24), Max: 98.1 (11 Aug 2022 12:29)  HR: 56 (12 Aug 2022 06:24) (56 - 60)  BP: 113/66 (12 Aug 2022 06:24) (113/66 - 130/68)  BP(mean): --  RR: 16 (12 Aug 2022 06:24) (16 - 18)  SpO2: 100% (12 Aug 2022 06:24) (98% - 100%)    Parameters below as of 12 Aug 2022 06:24  Patient On (Oxygen Delivery Method): room air        GENERAL: No acute distress, well-developed  HEAD:  Atraumatic, Normocephalic  EYES: EOMI, PERRLA, conjunctiva and sclera clear  NECK: Supple, no lymphadenopathy, no JVD  CHEST/LUNG: CTAB; No wheezes, rales, or rhonchi  HEART: Regular rate and rhythm; No murmurs, rubs, or gallops  ABDOMEN: Soft, non-tender, non-distended; normal bowel sounds, no organomegaly  EXTREMITIES:  2+ peripheral pulses b/l, No clubbing, cyanosis, or edema  NEUROLOGY: A&O x 3, no focal deficits, 5/5 strength UE b/l.   	H-Flex	H-Extension	K-Ext	D-Flex	P-Flex  R	2	3		3	3+	3+	 	   L	2	3		3	3	3		       Reflexes:              Biceps(C5)       BR(C6)     Triceps(C7)               Patellar(L4)    Achilles(S1)     R	2	          2	             2		        3		    3  L	2	          2	             2		        3		    3    SKIN: No rashes or lesions    LABS:                        12.2   2.49  )-----------( 178      ( 11 Aug 2022 05:40 )             38.7     08-11    139  |  105  |  7   ----------------------------<  103<H>  3.6   |  23  |  0.79    Ca    8.4      11 Aug 2022 05:40  Phos  3.1     08-11  Mg     1.80     08-11                  RADIOLOGY & ADDITIONAL TESTS:  Results Reviewed:   Imaging Personally Reviewed:  Electrocardiogram Personally Reviewed:    COORDINATION OF CARE:  Care Discussed with Consultants/Other Providers [Y/N]:  Prior or Outpatient Records Reviewed [Y/N]:   PROGRESS NOTE:   Authored by Karri Saldaña MD [id number]  Patient is a 32y old  Male who presents with a chief complaint of Lower extremity weakness starting 2 days ago, unable to walk (11 Aug 2022 12:03)      SUBJECTIVE / OVERNIGHT EVENTS: No complaints, no chest pain,  palpitations, dyspnea, abdominal pain. nausea, vomiting, diarrhea, blurry vision, dysuria, lightheadedness, dizziness.    ADDITIONAL REVIEW OF SYSTEMS:    MEDICATIONS  (STANDING):  baclofen 20 milliGRAM(s) Oral every 8 hours  bictegravir 50 mG/emtricitabine 200 mG/tenofovir alafenamide 25 mG (BIKTARVY) 1 Tablet(s) Oral daily  enoxaparin Injectable 40 milliGRAM(s) SubCutaneous every 24 hours  melatonin 6 milliGRAM(s) Oral at bedtime  pregabalin 50 milliGRAM(s) Oral three times a day  QUEtiapine 100 milliGRAM(s) Oral once  QUEtiapine 200 milliGRAM(s) Oral at bedtime    MEDICATIONS  (PRN):  acetaminophen     Tablet .. 650 milliGRAM(s) Oral every 6 hours PRN Temp greater or equal to 38C (100.4F), Mild Pain (1 - 3)  aluminum hydroxide/magnesium hydroxide/simethicone Suspension 30 milliLiter(s) Oral every 4 hours PRN Dyspepsia  melatonin 3 milliGRAM(s) Oral at bedtime PRN Insomnia  ondansetron Injectable 4 milliGRAM(s) IV Push every 8 hours PRN Nausea and/or Vomiting      CAPILLARY BLOOD GLUCOSE        I&O's Summary    11 Aug 2022 07:01  -  12 Aug 2022 06:44  --------------------------------------------------------  IN: 0 mL / OUT: 950 mL / NET: -950 mL        PHYSICAL EXAM:  Vital Signs Last 24 Hrs  T(C): 36.4 (12 Aug 2022 06:24), Max: 36.7 (11 Aug 2022 12:29)  T(F): 97.5 (12 Aug 2022 06:24), Max: 98.1 (11 Aug 2022 12:29)  HR: 56 (12 Aug 2022 06:24) (56 - 60)  BP: 113/66 (12 Aug 2022 06:24) (113/66 - 130/68)  BP(mean): --  RR: 16 (12 Aug 2022 06:24) (16 - 18)  SpO2: 100% (12 Aug 2022 06:24) (98% - 100%)    Parameters below as of 12 Aug 2022 06:24  Patient On (Oxygen Delivery Method): room air        GENERAL: No acute distress, well-developed  HEAD:  Atraumatic, Normocephalic  EYES: EOMI, PERRLA, conjunctiva and sclera clear  NECK: Supple, no lymphadenopathy, no JVD  CHEST/LUNG: CTAB; No wheezes, rales, or rhonchi  HEART: Regular rate and rhythm; No murmurs, rubs, or gallops  ABDOMEN: Soft, non-tender, non-distended; normal bowel sounds, no organomegaly  EXTREMITIES:  2+ peripheral pulses b/l, No clubbing, cyanosis, or edema  NEUROLOGY: A&O x 3, no focal deficits, 5/5 strength UE b/l.   	H-Flex	H-Extension	K-Ext	D-Flex	P-Flex  R	2	3		3	3+	3+	 	   L	2	3		3	3	3		       Reflexes:              Biceps(C5)       BR(C6)     Triceps(C7)               Patellar(L4)    Achilles(S1)     R	2	          2	             2		        3		    3  L	2	          2	             2		        3		    3    SKIN: No rashes or lesions    LABS:                        12.2   2.49  )-----------( 178      ( 11 Aug 2022 05:40 )             38.7     08-11    139  |  105  |  7   ----------------------------<  103<H>  3.6   |  23  |  0.79    Ca    8.4      11 Aug 2022 05:40  Phos  3.1     08-11  Mg     1.80     08-11    RADIOLOGY & ADDITIONAL TESTS: Reviewed   PROGRESS NOTE:   Authored by Karri Saldaña MD [id number]  Patient is a 32y old  Male who presents with a chief complaint of Lower extremity weakness starting 2 days ago, unable to walk (11 Aug 2022 12:03)      SUBJECTIVE / OVERNIGHT EVENTS: Complains of back pain, bilateral leg pain this morning. No chest pain,  palpitations, dyspnea, abdominal pain. nausea, vomiting, diarrhea, blurry vision, dysuria, lightheadedness, dizziness.    ADDITIONAL REVIEW OF SYSTEMS:    MEDICATIONS  (STANDING):  baclofen 20 milliGRAM(s) Oral every 8 hours  bictegravir 50 mG/emtricitabine 200 mG/tenofovir alafenamide 25 mG (BIKTARVY) 1 Tablet(s) Oral daily  enoxaparin Injectable 40 milliGRAM(s) SubCutaneous every 24 hours  melatonin 6 milliGRAM(s) Oral at bedtime  pregabalin 50 milliGRAM(s) Oral three times a day  QUEtiapine 100 milliGRAM(s) Oral once  QUEtiapine 200 milliGRAM(s) Oral at bedtime    MEDICATIONS  (PRN):  acetaminophen     Tablet .. 650 milliGRAM(s) Oral every 6 hours PRN Temp greater or equal to 38C (100.4F), Mild Pain (1 - 3)  aluminum hydroxide/magnesium hydroxide/simethicone Suspension 30 milliLiter(s) Oral every 4 hours PRN Dyspepsia  melatonin 3 milliGRAM(s) Oral at bedtime PRN Insomnia  ondansetron Injectable 4 milliGRAM(s) IV Push every 8 hours PRN Nausea and/or Vomiting      CAPILLARY BLOOD GLUCOSE        I&O's Summary    11 Aug 2022 07:01  -  12 Aug 2022 06:44  --------------------------------------------------------  IN: 0 mL / OUT: 950 mL / NET: -950 mL        PHYSICAL EXAM:  Vital Signs Last 24 Hrs  T(C): 36.4 (12 Aug 2022 06:24), Max: 36.7 (11 Aug 2022 12:29)  T(F): 97.5 (12 Aug 2022 06:24), Max: 98.1 (11 Aug 2022 12:29)  HR: 56 (12 Aug 2022 06:24) (56 - 60)  BP: 113/66 (12 Aug 2022 06:24) (113/66 - 130/68)  BP(mean): --  RR: 16 (12 Aug 2022 06:24) (16 - 18)  SpO2: 100% (12 Aug 2022 06:24) (98% - 100%)    Parameters below as of 12 Aug 2022 06:24  Patient On (Oxygen Delivery Method): room air        GENERAL: No acute distress, well-developed  HEAD:  Atraumatic, Normocephalic  EYES: EOMI, PERRLA, conjunctiva and sclera clear  NECK: Supple, no lymphadenopathy, no JVD  CHEST/LUNG: CTAB; No wheezes, rales, or rhonchi  HEART: Regular rate and rhythm; No murmurs, rubs, or gallops  ABDOMEN: Soft, non-tender, non-distended; normal bowel sounds, no organomegaly  EXTREMITIES:  2+ peripheral pulses b/l, No clubbing, cyanosis, or edema  NEUROLOGY: A&O x 3, no focal deficits, 5/5 strength UE b/l.   	H-Flex	H-Extension	K-Ext	D-Flex	P-Flex  R	2	3		3	3+	3+	 	   L	2	3		3	3	3		       Reflexes:              Biceps(C5)       BR(C6)     Triceps(C7)               Patellar(L4)    Achilles(S1)     R	2	          2	             2		        3		    3  L	2	          2	             2		        3		    3    SKIN: No rashes or lesions    LABS:                        12.2   2.49  )-----------( 178      ( 11 Aug 2022 05:40 )             38.7     08-11    139  |  105  |  7   ----------------------------<  103<H>  3.6   |  23  |  0.79    Ca    8.4      11 Aug 2022 05:40  Phos  3.1     08-11  Mg     1.80     08-11    RADIOLOGY & ADDITIONAL TESTS: Reviewed

## 2022-08-12 NOTE — PROGRESS NOTE ADULT - PROBLEM SELECTOR PLAN 5
Diet: regular  DVT Prophylaxis: lovenox 40 mg PO QD  Dispo: rehab. HIV outreach program on board.     Waiting on OT. Diet: regular  DVT Prophylaxis: lovenox 40 mg PO QD  Dispo: rehab. HIV outreach program on board.   OT recommends [facility]

## 2022-08-12 NOTE — PROGRESS NOTE ADULT - PROBLEM SELECTOR PLAN 2
-  lyrica 50 mg IID  - c/w baclofen 10 mg TID - lyrica 50 mg IID  - c/w baclofen 10 mg TID - lyrica 50 mg IID  - c/w baclofen 20 mg TID

## 2022-08-12 NOTE — PROGRESS NOTE ADULT - PROBLEM SELECTOR PLAN 1
Acute on chronic lower extremity weakness over past 2 days, associated with spasms, hyperreflexia.  HIV myelopathy vs transverse myelitis vs other demyelinating disorder vs less likely AIDP   - Negative CT head  - No findings on CT lumbar and thoracic  - F/u B6, Cu, Zn, ACE level, urine Ca++, HTLV, SHAI, MICHEAL, ANCA, CCP, MMA, anti-intrinsic factor Ab  - Folate, B12, homocysteine, syphilis, HgbA1c, TSH, ESR, CRP, Mg WNL  - PT/OT  - vitals q6  - monitor respiratory status  - f/u neuro recs Acute on chronic lower extremity weakness over past 2 days, associated with spasms, hyperreflexia.  HIV myelopathy vs transverse myelitis vs other demyelinating disorder vs less likely AIDP   - Negative CT head  - No findings on CT lumbar and thoracic  - F/u B6,  Zn, HTLV, SHAI, MICHEAL, ANCA, CCP, MMA, anti-intrinsic factor Ab  - urine ca++, ACE, copper, Folate, B12, homocysteine, syphilis, HgbA1c, TSH, ESR, CRP, Mg WNL  - PT/OT  - vitals q6  - monitor respiratory status  - f/u neuro recs  - will discharge to subacute rehab

## 2022-08-12 NOTE — PROGRESS NOTE ADULT - PROBLEM SELECTOR PLAN 3
- Last CD4 415 (3/22)  - HIV Viral load 5328 (3/22)  - Has not been adherent to medications  - F/u CD4, VL, HTLV 1/2, quant gold, Hep C  - Trend daily WBC, temp, LFTs, creat  - C/w biktarvy  - Sending genosure  - F/u ID recommendations    Appreciate HIV outreach program seeing the patient this morning. Patient states that he would not like to see Psychiatry anymore as he was able to talk to the individuals from the HIV outreach program. - Last CD4 415 (3/22)  - HIV Viral load 5328 (3/22)  - Has not been adherent to medications  - F/u  HTLV 1/2, quant gold  - ,000, CD4 - 207  - Hep C - neg ab  - Trend daily WBC, temp, LFTs, creat  - C/w biktarvy  - Sending genosure  - F/u ID recommendations    Appreciate HIV outreach program seeing the patient this morning. Patient states that he would not like to see Psychiatry anymore as he was able to talk to the individuals from the HIV outreach program.

## 2022-08-13 DIAGNOSIS — Z13.31 ENCOUNTER FOR SCREENING FOR DEPRESSION: ICD-10-CM

## 2022-08-13 LAB
ANA PAT FLD IF-IMP: ABNORMAL
ANA TITR SER: ABNORMAL
ANION GAP SERPL CALC-SCNC: 13 MMOL/L — SIGNIFICANT CHANGE UP (ref 7–14)
BASOPHILS # BLD AUTO: 0.01 K/UL — SIGNIFICANT CHANGE UP (ref 0–0.2)
BASOPHILS NFR BLD AUTO: 0.3 % — SIGNIFICANT CHANGE UP (ref 0–2)
BUN SERPL-MCNC: 7 MG/DL — SIGNIFICANT CHANGE UP (ref 7–23)
CALCIUM SERPL-MCNC: 9.3 MG/DL — SIGNIFICANT CHANGE UP (ref 8.4–10.5)
CHLORIDE SERPL-SCNC: 105 MMOL/L — SIGNIFICANT CHANGE UP (ref 98–107)
CO2 SERPL-SCNC: 23 MMOL/L — SIGNIFICANT CHANGE UP (ref 22–31)
CREAT SERPL-MCNC: 0.72 MG/DL — SIGNIFICANT CHANGE UP (ref 0.5–1.3)
EGFR: 124 ML/MIN/1.73M2 — SIGNIFICANT CHANGE UP
EOSINOPHIL # BLD AUTO: 0.05 K/UL — SIGNIFICANT CHANGE UP (ref 0–0.5)
EOSINOPHIL NFR BLD AUTO: 1.5 % — SIGNIFICANT CHANGE UP (ref 0–6)
GLUCOSE SERPL-MCNC: 110 MG/DL — HIGH (ref 70–99)
HCT VFR BLD CALC: 43.4 % — SIGNIFICANT CHANGE UP (ref 39–50)
HGB BLD-MCNC: 13.3 G/DL — SIGNIFICANT CHANGE UP (ref 13–17)
IANC: 0.61 K/UL — LOW (ref 1.8–7.4)
IMM GRANULOCYTES NFR BLD AUTO: 0 % — SIGNIFICANT CHANGE UP (ref 0–1.5)
LYMPHOCYTES # BLD AUTO: 2.32 K/UL — SIGNIFICANT CHANGE UP (ref 1–3.3)
LYMPHOCYTES # BLD AUTO: 71.4 % — HIGH (ref 13–44)
MAGNESIUM SERPL-MCNC: 1.6 MG/DL — SIGNIFICANT CHANGE UP (ref 1.6–2.6)
MCHC RBC-ENTMCNC: 28.2 PG — SIGNIFICANT CHANGE UP (ref 27–34)
MCHC RBC-ENTMCNC: 30.6 GM/DL — LOW (ref 32–36)
MCV RBC AUTO: 92.1 FL — SIGNIFICANT CHANGE UP (ref 80–100)
MONOCYTES # BLD AUTO: 0.26 K/UL — SIGNIFICANT CHANGE UP (ref 0–0.9)
MONOCYTES NFR BLD AUTO: 8 % — SIGNIFICANT CHANGE UP (ref 2–14)
NEUTROPHILS # BLD AUTO: 0.61 K/UL — LOW (ref 1.8–7.4)
NEUTROPHILS NFR BLD AUTO: 18.8 % — LOW (ref 43–77)
NRBC # BLD: 0 /100 WBCS — SIGNIFICANT CHANGE UP
NRBC # FLD: 0 K/UL — SIGNIFICANT CHANGE UP
PHOSPHATE SERPL-MCNC: 3 MG/DL — SIGNIFICANT CHANGE UP (ref 2.5–4.5)
PLATELET # BLD AUTO: 207 K/UL — SIGNIFICANT CHANGE UP (ref 150–400)
POTASSIUM SERPL-MCNC: 3.6 MMOL/L — SIGNIFICANT CHANGE UP (ref 3.5–5.3)
POTASSIUM SERPL-SCNC: 3.6 MMOL/L — SIGNIFICANT CHANGE UP (ref 3.5–5.3)
RBC # BLD: 4.71 M/UL — SIGNIFICANT CHANGE UP (ref 4.2–5.8)
RBC # FLD: 13.2 % — SIGNIFICANT CHANGE UP (ref 10.3–14.5)
SODIUM SERPL-SCNC: 141 MMOL/L — SIGNIFICANT CHANGE UP (ref 135–145)
WBC # BLD: 3.25 K/UL — LOW (ref 3.8–10.5)
WBC # FLD AUTO: 3.25 K/UL — LOW (ref 3.8–10.5)

## 2022-08-13 PROCEDURE — 99232 SBSQ HOSP IP/OBS MODERATE 35: CPT | Mod: GC

## 2022-08-13 RX ORDER — TRAMADOL HYDROCHLORIDE 50 MG/1
25 TABLET ORAL ONCE
Refills: 0 | Status: DISCONTINUED | OUTPATIENT
Start: 2022-08-13 | End: 2022-08-13

## 2022-08-13 RX ADMIN — Medication 50 MILLIGRAM(S): at 13:03

## 2022-08-13 RX ADMIN — Medication 50 MILLIGRAM(S): at 21:56

## 2022-08-13 RX ADMIN — QUETIAPINE FUMARATE 200 MILLIGRAM(S): 200 TABLET, FILM COATED ORAL at 21:56

## 2022-08-13 RX ADMIN — Medication 20 MILLIGRAM(S): at 06:06

## 2022-08-13 RX ADMIN — TRAMADOL HYDROCHLORIDE 25 MILLIGRAM(S): 50 TABLET ORAL at 22:25

## 2022-08-13 RX ADMIN — BICTEGRAVIR SODIUM, EMTRICITABINE, AND TENOFOVIR ALAFENAMIDE FUMARATE 1 TABLET(S): 30; 120; 15 TABLET ORAL at 11:08

## 2022-08-13 RX ADMIN — Medication 20 MILLIGRAM(S): at 21:56

## 2022-08-13 RX ADMIN — LIDOCAINE 1 PATCH: 4 CREAM TOPICAL at 04:11

## 2022-08-13 RX ADMIN — Medication 50 MILLIGRAM(S): at 06:06

## 2022-08-13 RX ADMIN — TRAMADOL HYDROCHLORIDE 25 MILLIGRAM(S): 50 TABLET ORAL at 21:55

## 2022-08-13 RX ADMIN — Medication 20 MILLIGRAM(S): at 13:02

## 2022-08-13 RX ADMIN — Medication 6 MILLIGRAM(S): at 21:56

## 2022-08-13 NOTE — PROGRESS NOTE ADULT - SUBJECTIVE AND OBJECTIVE BOX
DATE OF SERVICE: 08-13-22 @ 06:54    Patient is a 32y old  Male who presents with a chief complaint of Lower extremity weakness starting 2 days ago, unable to walk (12 Aug 2022 06:44)      SUBJECTIVE / OVERNIGHT EVENTS:    MEDICATIONS  (STANDING):  baclofen 20 milliGRAM(s) Oral every 8 hours  bictegravir 50 mG/emtricitabine 200 mG/tenofovir alafenamide 25 mG (BIKTARVY) 1 Tablet(s) Oral daily  enoxaparin Injectable 40 milliGRAM(s) SubCutaneous every 24 hours  melatonin 6 milliGRAM(s) Oral at bedtime  pregabalin 50 milliGRAM(s) Oral three times a day  QUEtiapine 100 milliGRAM(s) Oral once  QUEtiapine 200 milliGRAM(s) Oral at bedtime    MEDICATIONS  (PRN):  acetaminophen     Tablet .. 650 milliGRAM(s) Oral every 6 hours PRN Temp greater or equal to 38C (100.4F), Mild Pain (1 - 3)  aluminum hydroxide/magnesium hydroxide/simethicone Suspension 30 milliLiter(s) Oral every 4 hours PRN Dyspepsia  melatonin 3 milliGRAM(s) Oral at bedtime PRN Insomnia  ondansetron Injectable 4 milliGRAM(s) IV Push every 8 hours PRN Nausea and/or Vomiting      Vital Signs Last 24 Hrs  T(C): 36.2 (12 Aug 2022 21:33), Max: 36.4 (12 Aug 2022 12:51)  T(F): 97.2 (12 Aug 2022 21:33), Max: 97.6 (12 Aug 2022 12:51)  HR: 66 (12 Aug 2022 21:59) (54 - 73)  BP: 130/70 (12 Aug 2022 21:33) (123/74 - 130/70)  BP(mean): --  RR: 18 (12 Aug 2022 21:33) (18 - 18)  SpO2: 100% (12 Aug 2022 21:59) (98% - 100%)    Parameters below as of 12 Aug 2022 21:59  Patient On (Oxygen Delivery Method): room air      CAPILLARY BLOOD GLUCOSE        I&O's Summary    11 Aug 2022 07:01  -  12 Aug 2022 07:00  --------------------------------------------------------  IN: 0 mL / OUT: 950 mL / NET: -950 mL    12 Aug 2022 07:01  -  13 Aug 2022 06:54  --------------------------------------------------------  IN: 0 mL / OUT: 600 mL / NET: -600 mL        PHYSICAL EXAM:  GENERAL: NAD, well-developed  HEAD:  Atraumatic, Normocephalic  EYES: EOMI, PERRLA, conjunctiva and sclera clear  NECK: Supple, No JVD  CHEST/LUNG: Clear to auscultation bilaterally; No wheeze  HEART: Regular rate and rhythm; No murmurs, rubs, or gallops  ABDOMEN: Soft, Nontender, Nondistended; Bowel sounds present  EXTREMITIES:  2+ Peripheral Pulses, No clubbing, cyanosis, or edema  PSYCH: AAOx3  NEUROLOGY: non-focal  SKIN: No rashes or lesions    LABS:                        12.5   3.09  )-----------( 179      ( 12 Aug 2022 05:50 )             38.9     08-12    140  |  107  |  7   ----------------------------<  98  3.9   |  21<L>  |  0.73    Ca    8.8      12 Aug 2022 05:50  Phos  2.7     08-12  Mg     1.50     08-12                RADIOLOGY & ADDITIONAL TESTS:    Imaging Personally Reviewed:    Consultant(s) Notes Reviewed:      Care Discussed with Consultants/Other Providers:   DATE OF SERVICE: 08-13-22 @ 06:54    Patient is a 32y old  Male who presents with a chief complaint of Lower extremity weakness starting 2 days ago, unable to walk (12 Aug 2022 06:44)      SUBJECTIVE / OVERNIGHT EVENTS: No acute events overnight. Patient states that the increase in baclofen has made him more sleepy but has not improved his pain control. He took the lidocaine patch off because it made him feel nauseous. He states that he has a lot going on with his mental health. He no longer has a cough.     MEDICATIONS  (STANDING):  baclofen 20 milliGRAM(s) Oral every 8 hours  bictegravir 50 mG/emtricitabine 200 mG/tenofovir alafenamide 25 mG (BIKTARVY) 1 Tablet(s) Oral daily  enoxaparin Injectable 40 milliGRAM(s) SubCutaneous every 24 hours  melatonin 6 milliGRAM(s) Oral at bedtime  pregabalin 50 milliGRAM(s) Oral three times a day  QUEtiapine 100 milliGRAM(s) Oral once  QUEtiapine 200 milliGRAM(s) Oral at bedtime    MEDICATIONS  (PRN):  acetaminophen     Tablet .. 650 milliGRAM(s) Oral every 6 hours PRN Temp greater or equal to 38C (100.4F), Mild Pain (1 - 3)  aluminum hydroxide/magnesium hydroxide/simethicone Suspension 30 milliLiter(s) Oral every 4 hours PRN Dyspepsia  melatonin 3 milliGRAM(s) Oral at bedtime PRN Insomnia  ondansetron Injectable 4 milliGRAM(s) IV Push every 8 hours PRN Nausea and/or Vomiting      Vital Signs Last 24 Hrs  T(C): 36.2 (12 Aug 2022 21:33), Max: 36.4 (12 Aug 2022 12:51)  T(F): 97.2 (12 Aug 2022 21:33), Max: 97.6 (12 Aug 2022 12:51)  HR: 66 (12 Aug 2022 21:59) (54 - 73)  BP: 130/70 (12 Aug 2022 21:33) (123/74 - 130/70)  BP(mean): --  RR: 18 (12 Aug 2022 21:33) (18 - 18)  SpO2: 100% (12 Aug 2022 21:59) (98% - 100%)    Parameters below as of 12 Aug 2022 21:59  Patient On (Oxygen Delivery Method): room air      CAPILLARY BLOOD GLUCOSE        I&O's Summary    11 Aug 2022 07:01  -  12 Aug 2022 07:00  --------------------------------------------------------  IN: 0 mL / OUT: 950 mL / NET: -950 mL    12 Aug 2022 07:01  -  13 Aug 2022 06:54  --------------------------------------------------------  IN: 0 mL / OUT: 600 mL / NET: -600 mL        PHYSICAL EXAM:  GENERAL: NAD, well-developed  HEAD:  Atraumatic, Normocephalic  EYES: EOMI, PERRLA, conjunctiva pink and sclera white  NECK: Supple, No JVD  CHEST/LUNG: Clear to auscultation bilaterally; No wheeze.   HEART: Regular rate and rhythm; No murmurs, rubs, or gallops  ABDOMEN: Soft, Nontender, Nondistended; Bowel sounds present. Scars on left abdomen.   EXTREMITIES:  2+ Peripheral Pulses, No clubbing, cyanosis, or edema  PSYCH: AAOx3  NEUROLOGY: non-focal.   SKIN: No rashes or lesions. Dry skin on legs. Toenail thickening.     LABS:                        12.5   3.09  )-----------( 179      ( 12 Aug 2022 05:50 )             38.9     08-12    140  |  107  |  7   ----------------------------<  98  3.9   |  21<L>  |  0.73    Ca    8.8      12 Aug 2022 05:50  Phos  2.7     08-12  Mg     1.50     08-12                RADIOLOGY & ADDITIONAL TESTS:    Imaging Personally Reviewed:    Consultant(s) Notes Reviewed:      Care Discussed with Consultants/Other Providers:   DATE OF SERVICE: 08-13-22 @ 06:54    Patient is a 32y old  Male who presents with a chief complaint of Lower extremity weakness starting 2 days ago, unable to walk (12 Aug 2022 06:44)      SUBJECTIVE / OVERNIGHT EVENTS: No acute events overnight. Patient states that the increase in baclofen has made him more sleepy but has not improved his pain control. He took the lidocaine patch off because it made him feel nauseous. He states that he has a lot going on with his mental health. He no longer has a cough.     MEDICATIONS  (STANDING):  baclofen 20 milliGRAM(s) Oral every 8 hours  bictegravir 50 mG/emtricitabine 200 mG/tenofovir alafenamide 25 mG (BIKTARVY) 1 Tablet(s) Oral daily  enoxaparin Injectable 40 milliGRAM(s) SubCutaneous every 24 hours  melatonin 6 milliGRAM(s) Oral at bedtime  pregabalin 50 milliGRAM(s) Oral three times a day  QUEtiapine 100 milliGRAM(s) Oral once  QUEtiapine 200 milliGRAM(s) Oral at bedtime    MEDICATIONS  (PRN):  acetaminophen     Tablet .. 650 milliGRAM(s) Oral every 6 hours PRN Temp greater or equal to 38C (100.4F), Mild Pain (1 - 3)  aluminum hydroxide/magnesium hydroxide/simethicone Suspension 30 milliLiter(s) Oral every 4 hours PRN Dyspepsia  melatonin 3 milliGRAM(s) Oral at bedtime PRN Insomnia  ondansetron Injectable 4 milliGRAM(s) IV Push every 8 hours PRN Nausea and/or Vomiting      Vital Signs Last 24 Hrs  T(C): 36.2 (12 Aug 2022 21:33), Max: 36.4 (12 Aug 2022 12:51)  T(F): 97.2 (12 Aug 2022 21:33), Max: 97.6 (12 Aug 2022 12:51)  HR: 66 (12 Aug 2022 21:59) (54 - 73)  BP: 130/70 (12 Aug 2022 21:33) (123/74 - 130/70)  BP(mean): --  RR: 18 (12 Aug 2022 21:33) (18 - 18)  SpO2: 100% (12 Aug 2022 21:59) (98% - 100%)    Parameters below as of 12 Aug 2022 21:59  Patient On (Oxygen Delivery Method): room air      CAPILLARY BLOOD GLUCOSE        I&O's Summary    11 Aug 2022 07:01  -  12 Aug 2022 07:00  --------------------------------------------------------  IN: 0 mL / OUT: 950 mL / NET: -950 mL    12 Aug 2022 07:01  -  13 Aug 2022 06:54  --------------------------------------------------------  IN: 0 mL / OUT: 600 mL / NET: -600 mL        PHYSICAL EXAM:  GENERAL: NAD, well-developed  HEAD:  Atraumatic, Normocephalic  EYES: EOMI, PERRLA, conjunctiva pink and sclera white  NECK: Supple, No JVD  CHEST/LUNG: Clear to auscultation bilaterally; No wheeze.   HEART: Regular rate and rhythm; No murmurs, rubs, or gallops  ABDOMEN: Soft, Nontender, Nondistended; Bowel sounds present. Scars on left abdomen.   EXTREMITIES:  2+ Peripheral Pulses, No clubbing, cyanosis, or edema  PSYCH: AAOx3  NEUROLOGY: non-focal.     Strength:                H-Flex	H-Extension	K-Ext	D-Flex	P-Flex  R	2+	3		3	3+	3+	 	   L	2+	3		3	3	3		       Reflexes:              Biceps(C5)       BR(C6)     Triceps(C7)               Patellar(L4)    Achilles(S1)     R	2	          2	             2		        3		    3  L	2	          2	             2		        3		    3    SKIN: No rashes or lesions. Dry skin on legs. Toenail thickening.     LABS:                        12.5   3.09  )-----------( 179      ( 12 Aug 2022 05:50 )             38.9     08-12    140  |  107  |  7   ----------------------------<  98  3.9   |  21<L>  |  0.73    Ca    8.8      12 Aug 2022 05:50  Phos  2.7     08-12  Mg     1.50     08-12                RADIOLOGY & ADDITIONAL TESTS:    Imaging Personally Reviewed:    Consultant(s) Notes Reviewed:      Care Discussed with Consultants/Other Providers:   DATE OF SERVICE: 08-13-22 @ 06:54    Patient is a 32y old  Male who presents with a chief complaint of Lower extremity weakness starting 2 days ago, unable to walk (12 Aug 2022 06:44)    SUBJECTIVE / OVERNIGHT EVENTS: No acute events overnight. Patient states that the increase in baclofen has made him more sleepy but has not improved his pain control. He took the lidocaine patch off because it made him feel nauseous. He states that he has a lot going on with his mental health. He no longer has a cough.     MEDICATIONS  (STANDING):  baclofen 20 milliGRAM(s) Oral every 8 hours  bictegravir 50 mG/emtricitabine 200 mG/tenofovir alafenamide 25 mG (BIKTARVY) 1 Tablet(s) Oral daily  enoxaparin Injectable 40 milliGRAM(s) SubCutaneous every 24 hours  melatonin 6 milliGRAM(s) Oral at bedtime  pregabalin 50 milliGRAM(s) Oral three times a day  QUEtiapine 100 milliGRAM(s) Oral once  QUEtiapine 200 milliGRAM(s) Oral at bedtime    MEDICATIONS  (PRN):  acetaminophen     Tablet .. 650 milliGRAM(s) Oral every 6 hours PRN Temp greater or equal to 38C (100.4F), Mild Pain (1 - 3)  aluminum hydroxide/magnesium hydroxide/simethicone Suspension 30 milliLiter(s) Oral every 4 hours PRN Dyspepsia  melatonin 3 milliGRAM(s) Oral at bedtime PRN Insomnia  ondansetron Injectable 4 milliGRAM(s) IV Push every 8 hours PRN Nausea and/or Vomiting      Vital Signs Last 24 Hrs  T(C): 36.2 (12 Aug 2022 21:33), Max: 36.4 (12 Aug 2022 12:51)  T(F): 97.2 (12 Aug 2022 21:33), Max: 97.6 (12 Aug 2022 12:51)  HR: 66 (12 Aug 2022 21:59) (54 - 73)  BP: 130/70 (12 Aug 2022 21:33) (123/74 - 130/70)  BP(mean): --  RR: 18 (12 Aug 2022 21:33) (18 - 18)  SpO2: 100% (12 Aug 2022 21:59) (98% - 100%)    Parameters below as of 12 Aug 2022 21:59  Patient On (Oxygen Delivery Method): room air      CAPILLARY BLOOD GLUCOSE        I&O's Summary    11 Aug 2022 07:01  -  12 Aug 2022 07:00  --------------------------------------------------------  IN: 0 mL / OUT: 950 mL / NET: -950 mL    12 Aug 2022 07:01  -  13 Aug 2022 06:54  --------------------------------------------------------  IN: 0 mL / OUT: 600 mL / NET: -600 mL        PHYSICAL EXAM:  GENERAL: NAD, well-developed  HEAD:  Atraumatic, Normocephalic  EYES: EOMI, PERRLA, conjunctiva pink and sclera white  NECK: Supple, No JVD  CHEST/LUNG: Clear to auscultation bilaterally; No wheeze.   HEART: Regular rate and rhythm; No murmurs, rubs, or gallops  ABDOMEN: Soft, Nontender, Nondistended; Bowel sounds present. Scars on left abdomen.   EXTREMITIES:  2+ Peripheral Pulses, No clubbing, cyanosis, or edema  PSYCH: AAOx3  NEUROLOGY: non-focal.     Strength:                H-Flex	H-Extension	K-Ext	D-Flex	P-Flex  R	2+	3		3	3+	3+	 	   L	2+	3		3	3	3		       Reflexes:              Biceps(C5)       BR(C6)     Triceps(C7)               Patellar(L4)    Achilles(S1)     R	2	          2	             2		        3		    3  L	2	          2	             2		        3		    3    SKIN: No rashes or lesions. Dry skin on legs. Toenail thickening.     LABS:                        12.5   3.09  )-----------( 179      ( 12 Aug 2022 05:50 )             38.9     08-12    140  |  107  |  7   ----------------------------<  98  3.9   |  21<L>  |  0.73    Ca    8.8      12 Aug 2022 05:50  Phos  2.7     08-12  Mg     1.50     08-12    RADIOLOGY & ADDITIONAL TESTS: Reviewed

## 2022-08-13 NOTE — PROGRESS NOTE ADULT - PROBLEM SELECTOR PLAN 1
Acute on chronic lower extremity weakness over past 2 days, associated with spasms, hyperreflexia.  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

## 2022-08-13 NOTE — PROGRESS NOTE ADULT - ATTENDING COMMENTS
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 Here with unremarkable CT head and t/l spine. Neurology consulted, recs appreciated. Symptoms suspected to be progression of HIV myelopathy. Continued generalized pain. Baclofen increased per Neuro. Avoiding opiates as able. Appreciate ID input regarding HIV, not compliant with meds. Encouraged med adherence, close outpatient ID f/u. Appreciate PT/OT recs, recommending rehab, patient agreeable. Awaiting placement.

## 2022-08-13 NOTE — PROGRESS NOTE ADULT - PROBLEM SELECTOR PLAN 6
Diet: regular  DVT Prophylaxis: lovenox 40 mg PO QD  Dispo: rehab. HIV outreach program on board.   OT recommends [facility] Diet: regular  DVT Prophylaxis: lovenox 40 mg PO QD  Dispo: HIV outreach program on board.   OT recommends [facility]

## 2022-08-13 NOTE — PROGRESS NOTE ADULT - PROBLEM SELECTOR PLAN 5
Diet: regular  DVT Prophylaxis: lovenox 40 mg PO QD  Dispo: rehab. HIV outreach program on board.   OT recommends [facility] - c/w seroquel 100 mg qAM, 200 mg qPM  - melatonin 6 mg qPM PRN

## 2022-08-13 NOTE — PROGRESS NOTE ADULT - PROBLEM SELECTOR PLAN 2
- lyrica 50 mg IID  - c/w baclofen 20 mg TID - pregabalin (lyrica) 50 mg TID  - c/w baclofen 20 mg TID (increased 8/11)
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
## 2022-08-13 NOTE — PROGRESS NOTE ADULT - PROBLEM SELECTOR PLAN 3
- Last CD4 415 (3/22)  - HIV Viral load 5328 (3/22)  - Has not been adherent to medications  - F/u  HTLV 1/2, quant gold  - ,000, CD4 - 207  - Hep C - neg ab  - Trend daily WBC, temp, LFTs, creat  - C/w biktarvy  - Sending genosure  - F/u ID recommendations    Appreciate HIV outreach program seeing the patient this morning. Patient states that he would not like to see Psychiatry anymore as he was able to talk to the individuals from the HIV outreach program. - Last CD4 415 and HIV Viral load 5328 (3/22)  - Has not been adherent to medications  - HTLV wnl  - F/u quant gold  - ,000, CD4 - 207 (8/12)  - Hep C - neg ab  - Trend daily WBC, temp, LFTs, creat  - C/w biktarvy  - f/u genosure  - F/u ID recommendations  - seen by HIV outreach program

## 2022-08-14 ENCOUNTER — TRANSCRIPTION ENCOUNTER (OUTPATIENT)
Age: 32
End: 2022-08-14

## 2022-08-14 LAB
ANION GAP SERPL CALC-SCNC: 12 MMOL/L — SIGNIFICANT CHANGE UP (ref 7–14)
BASOPHILS # BLD AUTO: 0.02 K/UL — SIGNIFICANT CHANGE UP (ref 0–0.2)
BASOPHILS NFR BLD AUTO: 0.4 % — SIGNIFICANT CHANGE UP (ref 0–2)
BUN SERPL-MCNC: 13 MG/DL — SIGNIFICANT CHANGE UP (ref 7–23)
CALCIUM SERPL-MCNC: 8.8 MG/DL — SIGNIFICANT CHANGE UP (ref 8.4–10.5)
CHLORIDE SERPL-SCNC: 106 MMOL/L — SIGNIFICANT CHANGE UP (ref 98–107)
CO2 SERPL-SCNC: 22 MMOL/L — SIGNIFICANT CHANGE UP (ref 22–31)
CREAT SERPL-MCNC: 0.87 MG/DL — SIGNIFICANT CHANGE UP (ref 0.5–1.3)
EGFR: 117 ML/MIN/1.73M2 — SIGNIFICANT CHANGE UP
EOSINOPHIL # BLD AUTO: 0.07 K/UL — SIGNIFICANT CHANGE UP (ref 0–0.5)
EOSINOPHIL NFR BLD AUTO: 1.5 % — SIGNIFICANT CHANGE UP (ref 0–6)
GAMMA INTERFERON BACKGROUND BLD IA-ACNC: 0.04 IU/ML — SIGNIFICANT CHANGE UP
GLUCOSE SERPL-MCNC: 102 MG/DL — HIGH (ref 70–99)
HCT VFR BLD CALC: 38.1 % — LOW (ref 39–50)
HGB BLD-MCNC: 12.4 G/DL — LOW (ref 13–17)
IANC: 1.31 K/UL — LOW (ref 1.8–7.4)
IMM GRANULOCYTES NFR BLD AUTO: 0.2 % — SIGNIFICANT CHANGE UP (ref 0–1.5)
LYMPHOCYTES # BLD AUTO: 2.75 K/UL — SIGNIFICANT CHANGE UP (ref 1–3.3)
LYMPHOCYTES # BLD AUTO: 59 % — HIGH (ref 13–44)
M TB IFN-G BLD-IMP: NEGATIVE — SIGNIFICANT CHANGE UP
M TB IFN-G CD4+ BCKGRND COR BLD-ACNC: 0 IU/ML — SIGNIFICANT CHANGE UP
M TB IFN-G CD4+CD8+ BCKGRND COR BLD-ACNC: 0 IU/ML — SIGNIFICANT CHANGE UP
MCHC RBC-ENTMCNC: 29.1 PG — SIGNIFICANT CHANGE UP (ref 27–34)
MCHC RBC-ENTMCNC: 32.5 GM/DL — SIGNIFICANT CHANGE UP (ref 32–36)
MCV RBC AUTO: 89.4 FL — SIGNIFICANT CHANGE UP (ref 80–100)
METHYLMALONATE SERPL-SCNC: 86 NMOL/L — SIGNIFICANT CHANGE UP (ref 0–378)
MONOCYTES # BLD AUTO: 0.5 K/UL — SIGNIFICANT CHANGE UP (ref 0–0.9)
MONOCYTES NFR BLD AUTO: 10.7 % — SIGNIFICANT CHANGE UP (ref 2–14)
NEUTROPHILS # BLD AUTO: 1.31 K/UL — LOW (ref 1.8–7.4)
NEUTROPHILS NFR BLD AUTO: 28.2 % — LOW (ref 43–77)
NRBC # BLD: 0 /100 WBCS — SIGNIFICANT CHANGE UP
NRBC # FLD: 0 K/UL — SIGNIFICANT CHANGE UP
PLATELET # BLD AUTO: 210 K/UL — SIGNIFICANT CHANGE UP (ref 150–400)
POTASSIUM SERPL-MCNC: 3.8 MMOL/L — SIGNIFICANT CHANGE UP (ref 3.5–5.3)
POTASSIUM SERPL-SCNC: 3.8 MMOL/L — SIGNIFICANT CHANGE UP (ref 3.5–5.3)
QUANT TB PLUS MITOGEN MINUS NIL: 7.92 IU/ML — SIGNIFICANT CHANGE UP
RBC # BLD: 4.26 M/UL — SIGNIFICANT CHANGE UP (ref 4.2–5.8)
RBC # FLD: 13.2 % — SIGNIFICANT CHANGE UP (ref 10.3–14.5)
SODIUM SERPL-SCNC: 140 MMOL/L — SIGNIFICANT CHANGE UP (ref 135–145)
WBC # BLD: 4.66 K/UL — SIGNIFICANT CHANGE UP (ref 3.8–10.5)
WBC # FLD AUTO: 4.66 K/UL — SIGNIFICANT CHANGE UP (ref 3.8–10.5)

## 2022-08-14 PROCEDURE — 99232 SBSQ HOSP IP/OBS MODERATE 35: CPT | Mod: GC

## 2022-08-14 RX ORDER — TRAMADOL HYDROCHLORIDE 50 MG/1
25 TABLET ORAL ONCE
Refills: 0 | Status: DISCONTINUED | OUTPATIENT
Start: 2022-08-14 | End: 2022-08-14

## 2022-08-14 RX ADMIN — QUETIAPINE FUMARATE 200 MILLIGRAM(S): 200 TABLET, FILM COATED ORAL at 21:34

## 2022-08-14 RX ADMIN — Medication 50 MILLIGRAM(S): at 21:33

## 2022-08-14 RX ADMIN — Medication 50 MILLIGRAM(S): at 13:10

## 2022-08-14 RX ADMIN — Medication 20 MILLIGRAM(S): at 05:58

## 2022-08-14 RX ADMIN — Medication 20 MILLIGRAM(S): at 21:34

## 2022-08-14 RX ADMIN — Medication 50 MILLIGRAM(S): at 05:58

## 2022-08-14 RX ADMIN — BICTEGRAVIR SODIUM, EMTRICITABINE, AND TENOFOVIR ALAFENAMIDE FUMARATE 1 TABLET(S): 30; 120; 15 TABLET ORAL at 13:10

## 2022-08-14 RX ADMIN — Medication 6 MILLIGRAM(S): at 21:34

## 2022-08-14 RX ADMIN — Medication 20 MILLIGRAM(S): at 13:10

## 2022-08-14 RX ADMIN — TRAMADOL HYDROCHLORIDE 25 MILLIGRAM(S): 50 TABLET ORAL at 22:03

## 2022-08-14 RX ADMIN — TRAMADOL HYDROCHLORIDE 25 MILLIGRAM(S): 50 TABLET ORAL at 21:33

## 2022-08-14 RX ADMIN — Medication 650 MILLIGRAM(S): at 06:38

## 2022-08-14 RX ADMIN — Medication 650 MILLIGRAM(S): at 06:08

## 2022-08-14 NOTE — PROGRESS NOTE ADULT - SUBJECTIVE AND OBJECTIVE BOX
DATE OF SERVICE: 08-14-22 @ 06:55    Patient is a 32y old  Male who presents with a chief complaint of Lower extremity weakness starting 2 days ago, unable to walk (13 Aug 2022 06:53)      SUBJECTIVE / OVERNIGHT EVENTS:    MEDICATIONS  (STANDING):  baclofen 20 milliGRAM(s) Oral every 8 hours  bictegravir 50 mG/emtricitabine 200 mG/tenofovir alafenamide 25 mG (BIKTARVY) 1 Tablet(s) Oral daily  enoxaparin Injectable 40 milliGRAM(s) SubCutaneous every 24 hours  melatonin 6 milliGRAM(s) Oral at bedtime  pregabalin 50 milliGRAM(s) Oral three times a day  QUEtiapine 100 milliGRAM(s) Oral once  QUEtiapine 200 milliGRAM(s) Oral at bedtime    MEDICATIONS  (PRN):  acetaminophen     Tablet .. 650 milliGRAM(s) Oral every 6 hours PRN Temp greater or equal to 38C (100.4F), Mild Pain (1 - 3)  aluminum hydroxide/magnesium hydroxide/simethicone Suspension 30 milliLiter(s) Oral every 4 hours PRN Dyspepsia  melatonin 3 milliGRAM(s) Oral at bedtime PRN Insomnia  ondansetron Injectable 4 milliGRAM(s) IV Push every 8 hours PRN Nausea and/or Vomiting      Vital Signs Last 24 Hrs  T(C): 36.7 (14 Aug 2022 06:03), Max: 36.7 (13 Aug 2022 22:24)  T(F): 98.1 (14 Aug 2022 06:03), Max: 98.1 (13 Aug 2022 22:24)  HR: 61 (14 Aug 2022 06:03) (60 - 73)  BP: 106/60 (14 Aug 2022 06:03) (106/60 - 140/80)  BP(mean): --  RR: 18 (14 Aug 2022 06:03) (18 - 18)  SpO2: 97% (14 Aug 2022 06:03) (97% - 100%)    Parameters below as of 14 Aug 2022 06:03  Patient On (Oxygen Delivery Method): room air      CAPILLARY BLOOD GLUCOSE        I&O's Summary    12 Aug 2022 07:01  -  13 Aug 2022 07:00  --------------------------------------------------------  IN: 400 mL / OUT: 600 mL / NET: -200 mL    13 Aug 2022 07:01  -  14 Aug 2022 06:55  --------------------------------------------------------  IN: 700 mL / OUT: 1300 mL / NET: -600 mL        PHYSICAL EXAM:  GENERAL: NAD, well-developed  HEAD:  Atraumatic, Normocephalic  EYES: EOMI, PERRLA, conjunctiva and sclera clear  NECK: Supple, No JVD  CHEST/LUNG: Clear to auscultation bilaterally; No wheeze  HEART: Regular rate and rhythm; No murmurs, rubs, or gallops  ABDOMEN: Soft, Nontender, Nondistended; Bowel sounds present  EXTREMITIES:  2+ Peripheral Pulses, No clubbing, cyanosis, or edema  PSYCH: AAOx3  NEUROLOGY: non-focal  SKIN: No rashes or lesions    LABS:                        13.3   3.25  )-----------( 207      ( 13 Aug 2022 06:40 )             43.4     08-13    141  |  105  |  7   ----------------------------<  110<H>  3.6   |  23  |  0.72    Ca    9.3      13 Aug 2022 06:40  Phos  3.0     08-13  Mg     1.60     08-13                RADIOLOGY & ADDITIONAL TESTS:    Imaging Personally Reviewed:    Consultant(s) Notes Reviewed:      Care Discussed with Consultants/Other Providers:   DATE OF SERVICE: 08-14-22 @ 06:55    Patient is a 32y old  Male who presents with a chief complaint of Lower extremity weakness starting 2 days ago, unable to walk (13 Aug 2022 06:53)      SUBJECTIVE / OVERNIGHT EVENTS: Patient reported some jaw pain overnight and received Tramadol with improvement of his pain. This morning patient was concerned about a lesion in his groin. He also wanted to stretch his legs.     MEDICATIONS  (STANDING):  baclofen 20 milliGRAM(s) Oral every 8 hours  bictegravir 50 mG/emtricitabine 200 mG/tenofovir alafenamide 25 mG (BIKTARVY) 1 Tablet(s) Oral daily  enoxaparin Injectable 40 milliGRAM(s) SubCutaneous every 24 hours  melatonin 6 milliGRAM(s) Oral at bedtime  pregabalin 50 milliGRAM(s) Oral three times a day  QUEtiapine 100 milliGRAM(s) Oral once  QUEtiapine 200 milliGRAM(s) Oral at bedtime    MEDICATIONS  (PRN):  acetaminophen     Tablet .. 650 milliGRAM(s) Oral every 6 hours PRN Temp greater or equal to 38C (100.4F), Mild Pain (1 - 3)  aluminum hydroxide/magnesium hydroxide/simethicone Suspension 30 milliLiter(s) Oral every 4 hours PRN Dyspepsia  melatonin 3 milliGRAM(s) Oral at bedtime PRN Insomnia  ondansetron Injectable 4 milliGRAM(s) IV Push every 8 hours PRN Nausea and/or Vomiting      Vital Signs Last 24 Hrs  T(C): 36.7 (14 Aug 2022 06:03), Max: 36.7 (13 Aug 2022 22:24)  T(F): 98.1 (14 Aug 2022 06:03), Max: 98.1 (13 Aug 2022 22:24)  HR: 61 (14 Aug 2022 06:03) (60 - 73)  BP: 106/60 (14 Aug 2022 06:03) (106/60 - 140/80)  BP(mean): --  RR: 18 (14 Aug 2022 06:03) (18 - 18)  SpO2: 97% (14 Aug 2022 06:03) (97% - 100%)    Parameters below as of 14 Aug 2022 06:03  Patient On (Oxygen Delivery Method): room air      CAPILLARY BLOOD GLUCOSE        I&O's Summary    12 Aug 2022 07:01  -  13 Aug 2022 07:00  --------------------------------------------------------  IN: 400 mL / OUT: 600 mL / NET: -200 mL    13 Aug 2022 07:01  -  14 Aug 2022 06:55  --------------------------------------------------------  IN: 700 mL / OUT: 1300 mL / NET: -600 mL        PHYSICAL EXAM:  GENERAL: NAD, well-developed  HEAD:  Atraumatic, Normocephalic. Tender submandibular, no lymphadenopathy appreciated.   EYES: EOMI, PERRLA, conjunctiva and sclera clear  NECK: Supple, No JVD  CHEST/LUNG: Clear to auscultation bilaterally; No wheeze  HEART: Regular rate and rhythm; No murmurs, rubs, or gallops  ABDOMEN: Soft, Nontender, Nondistended; Bowel sounds present. Scars present.   : raised, skin-colored lesion, 1cm in length left groin. No tenderness.   EXTREMITIES:  2+ Peripheral Pulses, No clubbing, cyanosis, or edema  PSYCH: AAOx3  NEUROLOGY:     Strength:                H-Flex	H-Extension	K-Ext	D-Flex	P-Flex  R	3	3		3+	3+	3+	 	   L	3	3		3+	3	3		       Reflexes:              Biceps(C5)       BR(C6)     Triceps(C7)               Patellar(L4)    Achilles(S1)     R	2	          2	             2		        3		    3  L	2	          2	             2		        3		    3    SKIN: No rashes or lesions. Dry skin.     LABS:                        13.3   3.25  )-----------( 207      ( 13 Aug 2022 06:40 )             43.4     08-13    141  |  105  |  7   ----------------------------<  110<H>  3.6   |  23  |  0.72    Ca    9.3      13 Aug 2022 06:40  Phos  3.0     08-13  Mg     1.60     08-13                RADIOLOGY & ADDITIONAL TESTS:    Imaging Personally Reviewed:    Consultant(s) Notes Reviewed:      Care Discussed with Consultants/Other Providers:

## 2022-08-14 NOTE — DISCHARGE NOTE PROVIDER - NSDCCPTREATMENT_GEN_ALL_CORE_FT
PRINCIPAL PROCEDURE  Procedure: CT of thoracic and lumbar spine with contrast  Findings and Treatment:   < end of copied text >  ACC: [id number] EXAM:  CT LUMBAR SPINE IC                        ACC: [id number] EXAM:  CT THORACIC SPINE IC                        PROCEDURE DATE:  08/09/2022    INTERPRETATION:  Clinical indication: Back pain. Lower extremity weakness  Following injection approximately 64 cc of Omnipaque 350 IV, axial   sections were performed through the thoracic and lumbar spine region.   Coronal and sagittal destructions were performed as well  Thoracic spine:  The vertebral body height alignmentappear normal  No acute fractures or dislocation seen.  No abnormal lytic or blastic lesions are seen.  The patient paraspinal soft tissues appear unremarkable.  The visualized portions of lungs appear clear.  Lumbar spine:  Loss of normal lumbar lordosis is seen.  The vertebral body height alignment appear normal  No acute fractures or dislocations are identified.  Evaluation of the paraspinal soft tissues appear normal  IMPRESSION: Unremarkable CT scan of the thoracic and lumbar spine.  If symptoms continue and a mass is still clinically suspected contrast   enhanced MRI is recommended if there are no contraindications.  --- End of Report ---  TEODORO ROBERTSON MD; Attending Radiologist  This document has been electronically signed. Aug  9 2022  1:08PM< from: CT Lumbar Spine w/ IV Cont (08.09.22 @ 08:59) >        SECONDARY PROCEDURE  Procedure: Chest x-ray, single view  Findings and Treatment:   < end of copied text >  ACC: [id number] EXAM:  XR CHEST AP OR PA 1V                        PROCEDURE DATE:  08/09/2022    INTERPRETATION:  EXAMINATION: XR CHEST  CLINICAL INDICATION: Bilateral lower extremity weakness. History of   Guillain-Noel syndrome and HIV.  TECHNIQUE: Single frontal, portable view of the chest was obtained.  COMPARISON: Chest x-ray 5/20/2022.  FINDINGS:  Apical lordotic projection.  Heart size is not accurately assessed on this single AP projection due to   magnification.  No focal consolidation, pneumothorax, nor pleural effusion.  No acute bony abnormality.  IMPRESSION: Clear lungs.  --- End of Report ---  FÁTIMA MATHEW MD; Resident Radiologist  This document has been electronically signed.  REI OSORIO MD; Attending Radiologist  This document has been electronically signed. Aug  9 2022  7:22AM< from: Xray Chest 1 View AP/PA (08.09.22 @ 07:09) >

## 2022-08-14 NOTE — DISCHARGE NOTE PROVIDER - NSDCMRMEDTOKEN_GEN_ALL_CORE_FT
baclofen 10 mg oral tablet: 1 tab(s) orally 3 times a day  bictegravir/emtricitabine/tenofovir 50 mg-200 mg-25 mg oral tablet: 1 tab(s) orally once a day  magnesium oxide 400 mg oral tablet: 1 tab(s) orally once a day  melatonin 5 mg oral tablet: 1 tab(s) orally once a day (at bedtime)  oseltamivir 75 mg oral capsule: 1 cap(s) orally 2 times a day until 3/6.  SEROquel 100 mg oral tablet: 100 mG in the morning and 200 mG in the evening. x   baclofen 10 mg oral tablet: 1 tab(s) orally 3 times a day  magnesium oxide 400 mg oral tablet: 1 tab(s) orally once a day  melatonin 5 mg oral tablet: 1 tab(s) orally once a day (at bedtime)  SEROquel 100 mg oral tablet: 100 mG in the morning and 200 mG in the evening. x   baclofen 10 mg oral tablet: 1 tab(s) orally 3 times a day  magnesium oxide 400 mg oral tablet: 1 tab(s) orally once a day  melatonin 5 mg oral tablet: 1 tab(s) orally once a day (at bedtime)  outpatient physical therapy ICD G99.2 : outpatient physical therapy ICD G99.2   SEROquel 100 mg oral tablet: 100 mG in the morning and 200 mG in the evening. x   baclofen 10 mg oral tablet: 2 tab(s) orally 3 times a day MDD:6 tabs  bictegravir/emtricitabine/tenofovir 50 mg-200 mg-25 mg oral tablet: 1 tab(s) orally once a day MDD:1  magnesium oxide 400 mg oral tablet: 1 tab(s) orally once a day MDD:1  melatonin 5 mg oral tablet: 1 tab(s) orally once a day (at bedtime)  outpatient physical therapy ICD G99.2 : outpatient physical therapy ICD G99.2   pregabalin 50 mg oral capsule: 1 cap(s) orally 3 times a day MDD:3  SEROquel 200 mg oral tablet: 1 tab(s) orally once a day (at bedtime) MDD:1   baclofen 10 mg oral tablet: 2 tab(s) orally 3 times a day MDD:6 tabs  bictegravir/emtricitabine/tenofovir 50 mg-200 mg-25 mg oral tablet: 1 tab(s) orally once a day MDD:1  magnesium oxide 400 mg oral tablet: 1 tab(s) orally once a day MDD:1  melatonin 5 mg oral tablet: 1 tab(s) orally once a day (at bedtime)  Outpatient physical therapy services. :   pregabalin 50 mg oral capsule: 1 cap(s) orally 3 times a day MDD:3  QUEtiapine 100 mg oral tablet: 1 tab(s) orally once a day at 8:00am  SEROquel 200 mg oral tablet: 1 tab(s) orally once a day (at bedtime) MDD:1
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
## 2022-08-14 NOTE — PROGRESS NOTE ADULT - PROBLEM SELECTOR PLAN 6
Diet: regular  DVT Prophylaxis: lovenox 40 mg PO QD  Dispo: HIV outreach program on board.   OT recommends [facility]DEN Diet: regular  DVT Prophylaxis: lovenox 40 mg PO QD  Dispo: HIV outreach program on board.   OT recommends BRAYDEN, pending rehab placement

## 2022-08-14 NOTE — DISCHARGE NOTE PROVIDER - PROVIDER TOKENS
PROVIDER:[TOKEN:[[id number]]],PROVIDER:[TOKEN:[[id number]]] PROVIDER:[TOKEN:[[id number]]],PROVIDER:[TOKEN:[[id number]]],PROVIDER:[TOKEN:[[id number]],ESTABLISHEDPATIENT:[T]]

## 2022-08-14 NOTE — PROGRESS NOTE ADULT - PROBLEM SELECTOR PLAN 3
- Last CD4 415 and HIV Viral load 5328 (3/22)  - Has not been adherent to medications  - HTLV wnl  - F/u quant gold  - ,000, CD4 - 207 (8/12)  - Hep C - neg ab  - Trend daily WBC, temp, LFTs, creat  - C/w biktarvy  - f/u genosure  - F/u ID recommendations  - seen by HIV outreach program - Last CD4 415 and HIV Viral load 5328 (3/22)  - Has not been adherent to medications  - HTLV wnl  - Quant gold negative  - ,000, CD4 - 207, 11% (8/12)  [] discuss starting PCP prophylaxis with ID given CD4 <14%.   - Hep C - neg ab  - SHAI positive, possibly 2/2 HIV (seen in ~25% of patients with HIV)  - Trend daily WBC, temp, LFTs, creat  - C/w biktarvy  - f/u genosure  - F/u ID recommendations  - seen by HIV outreach program

## 2022-08-14 NOTE — DISCHARGE NOTE PROVIDER - CARE PROVIDER_API CALL
Easton Hunter)  Internal Medicine  [address] [place] [address]  Phone: [phone]  Fax: [phone]  Follow Up Time:     Ambika Fam)  Clinical Neurophysiology; Electrodiagnostic Medicine; Neurology  [address] [place] [address] [address]  [place] [address]  Phone: [phone]  Fax: [phone]  Follow Up Time:    Easton Hunter)  Internal Medicine  [address] [place] [address]  Phone: [phone]  Fax: [phone]  Follow Up Time:     Ambika Fam)  Clinical Neurophysiology; Electrodiagnostic Medicine; Neurology  [address] [facility], [address]  [place] [address]  Phone: [phone]  Fax: [phone]  Follow Up Time:     Yolis Savage)  Family Medicine  [address] [place] [address]  Phone: [phone]  Fax: [phone]  Established Patient  Follow Up Time:

## 2022-08-14 NOTE — PROGRESS NOTE ADULT - ATTENDING COMMENTS
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 Here with unremarkable CT head and t/l spine. Neurology consulted, recs appreciated. Symptoms suspected to be progression of HIV myelopathy. Continued generalized pain. Continues on baclofen per Neuro, along with pregabalin. Avoiding opiates as able. Appreciate ID input regarding HIV, not compliant with meds. Encouraged med adherence, close outpatient ID f/u. Appreciate PT/OT recs, recommending rehab, patient agreeable. Awaiting placement.

## 2022-08-14 NOTE — DISCHARGE NOTE PROVIDER - NSDCCAREPROVSEEN_GEN_ALL_CORE_FT
[facility]  Dr. Corona (Attending Physician)  Dr. Monroe (Resident Physician)  Dr. Vera (Resident Physician)

## 2022-08-14 NOTE — PROGRESS NOTE ADULT - PROBLEM SELECTOR PLAN 1
Acute on chronic lower extremity weakness over past 2 days, associated with spasms, hyperreflexia.  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

## 2022-08-14 NOTE — DISCHARGE NOTE PROVIDER - CARE PROVIDERS DIRECT ADDRESSES
[email or web address]

## 2022-08-14 NOTE — DISCHARGE NOTE PROVIDER - HOSPITAL COURSE
HPI:  32 year old man with HIV, HIV myelopathy, asthma who is here for acute on chronic worsening lower extremity weakness starting 2 days ago. He has been having difficulty walking for the past 2 days. His friend brought him by car to the hospital. At his baseline he is able to walk 1 block in 5 minutes. Lower extremity weakness is associated with muscle spasms of his lower extremity, low back pain, bilateral calf pain and lateral neck pain. He also has urinary retention and incontinence and fecal incontinence. He denies any saddle paresthesias. He has no recent viral, flu-like illness associated with fever, muscle aches, cough, sore throat or diarrhea in the past few months. He has no recent physical trauma. He denies nausea, vomiting, diarrhea, fever, abdominal pain, dysuria, chest pain, palpitations, dyspnea, cough, sore throat, blurry vision, or headache. Lives in a shelter in [place]. He takes his medications regularly, and misses a few doses occasionally.     HOSPITAL COURSE  The patient was admitted for evaluation of his acute on chronic symptoms. After discussions with Neurology it was determined that the presenting symptoms are similar to prior episode earlier this year and are likely associated with progression of his disease iso of poor medication compliance. Due to this no further work up was performed. Neuro recommended current therapy and physical therapy. ID was consulted to reestablish care and they facilitated a meeting with the HIV outpatient outreach service. PT evaluated the patient and recommended rehab. The patient remained stable during evaluation. He was discharged to rehab. HPI:  32 year old man with HIV, HIV myelopathy, asthma who is here for acute on chronic worsening lower extremity weakness starting 2 days ago. He has been having difficulty walking for the past 2 days. His friend brought him by car to the hospital. At his baseline he is able to walk 1 block in 5 minutes. Lower extremity weakness is associated with muscle spasms of his lower extremity, low back pain, bilateral calf pain and lateral neck pain. He also has urinary retention and incontinence and fecal incontinence. He denies any saddle paresthesias. He has no recent viral, flu-like illness associated with fever, muscle aches, cough, sore throat or diarrhea in the past few months. He has no recent physical trauma. He denies nausea, vomiting, diarrhea, fever, abdominal pain, dysuria, chest pain, palpitations, dyspnea, cough, sore throat, blurry vision, or headache. Lives in a shelter in [place]. He takes his medications regularly, and misses a few doses occasionally.     HOSPITAL COURSE  The patient was admitted for evaluation of his acute on chronic symptoms. After discussions with Neurology it was determined that the presenting symptoms are similar to prior episode earlier this year and are likely associated with progression of his disease iso of poor medication compliance. Due to this no further work up was performed. Neuro recommended current therapy and physical therapy. ID was consulted to reestablish care and they facilitated a meeting with the HIV outpatient outreach service. PT evaluated the patient and recommended rehab. Pt refused rehab and elected to be discharged home with outpatient PT.     Case and plan d/w Dr. Corona (Attending Physician) HPI:  32 year old man with HIV, HIV myelopathy, asthma who is here for acute on chronic worsening lower extremity weakness starting 2 days ago. He has been having difficulty walking for the past 2 days. His friend brought him by car to the hospital. At his baseline he is able to walk 1 block in 5 minutes. Lower extremity weakness is associated with muscle spasms of his lower extremity, low back pain, bilateral calf pain and lateral neck pain. He also has urinary retention and incontinence and fecal incontinence. He denies any saddle paresthesias. He has no recent viral, flu-like illness associated with fever, muscle aches, cough, sore throat or diarrhea in the past few months. He has no recent physical trauma. He denies nausea, vomiting, diarrhea, fever, abdominal pain, dysuria, chest pain, palpitations, dyspnea, cough, sore throat, blurry vision, or headache. Lives in a shelter in [place]. He takes his medications regularly, and misses a few doses occasionally.     HOSPITAL COURSE  The patient was admitted for evaluation of his acute on chronic symptoms. After discussions with Neurology it was determined that the presenting symptoms are similar to prior episode earlier this year and are likely associated with progression of his disease iso of poor medication compliance. Due to this no further work up was performed. Neuro recommended current therapy and physical therapy. ID was consulted to reestablish care and they facilitated a meeting with the HIV outpatient outreach service. PT evaluated the patient and recommended rehab. Pt refused rehab and elected to be discharged home with outpatient PT. Psychiatry saw patient due to concerns for depression and PTSD.     Case and plan d/w Dr. Corona (Attending Physician) HPI:  32 year old man with HIV, HIV myelopathy, asthma who is here for acute on chronic worsening lower extremity weakness starting 2 days ago. He has been having difficulty walking for the past 2 days. His friend brought him by car to the hospital. At his baseline he is able to walk 1 block in 5 minutes. Lower extremity weakness is associated with muscle spasms of his lower extremity, low back pain, bilateral calf pain and lateral neck pain. He also has urinary retention and incontinence and fecal incontinence. He denies any saddle paresthesias. He has no recent viral, flu-like illness associated with fever, muscle aches, cough, sore throat or diarrhea in the past few months. He has no recent physical trauma. He denies nausea, vomiting, diarrhea, fever, abdominal pain, dysuria, chest pain, palpitations, dyspnea, cough, sore throat, blurry vision, or headache. Lives in a shelter in [place]. He takes his medications regularly, and misses a few doses occasionally.     HOSPITAL COURSE  The patient was admitted for evaluation of his acute on chronic symptoms. After discussions with Neurology it was determined that the presenting symptoms are similar to prior episode earlier this year and are likely associated with progression of his disease iso of poor medication compliance. Due to this no further work up was performed. Neuro recommended current therapy and physical therapy. ID was consulted to reestablish care and they facilitated a meeting with the HIV outpatient outreach service. PT evaluated the patient and recommended rehab. Pt refused rehab and elected to be discharged home with outpatient PT.     Pt stable for dc home w/ outpt PT and f/u w/ PCP and ID. Of note pt relayed that he would like to speak to a behavioral health professional, pt can follow up with PCP upon discharge to be directed to BH at their discretion.     Case and plan d/w Dr. Corona (Attending Physician)

## 2022-08-14 NOTE — DISCHARGE NOTE PROVIDER - NSDCCPCAREPLAN_GEN_ALL_CORE_FT
PRINCIPAL DISCHARGE DIAGNOSIS  Diagnosis: Bilateral leg weakness  Assessment and Plan of Treatment: You were admitted due to worsening LE weakness. Neurology evaluated you and they determined that this is likely progression of the weakness caused by HIV. Initial imaging of the lower back was performed which was normal. Neurology recommended continuing Gabapentin and Baclofen and getting physical therapy/rehabilitation. It is very important that you continue taking your medications for HIV as not taking it xcan cause your disease to progress in addition to putting you at risks of infections due to a low immune system.      SECONDARY DISCHARGE DIAGNOSES  Diagnosis: HIV disease  Assessment and Plan of Treatment: On admission you reported that you were not taking your HIV medication. Infectious disease were consulted to reestablish care and set you up with the HIV outreach program to help you continue care. Please take your medications as prescribed. Not taking your medication can cause resistance and as a consequence the medication can become infective. Additionally, progression of the HIV  can cause your immune system to become weak and uput you at risk of infections.     PRINCIPAL DISCHARGE DIAGNOSIS  Diagnosis: Bilateral leg weakness  Assessment and Plan of Treatment: You were admitted due to worsening lower extremity weakness. Neurology evaluated you and they determined that this is likely progression of the weakness caused by HIV. Initial imaging of the lower back was performed which was normal. Neurology recommended continuing Gabapentin and Baclofen and getting physical therapy/rehabilitation. It is very important that you continue taking your medications for HIV as not taking it xcan cause your disease to progress in addition to putting you at risks of infections due to a low immune system.  Please return to the hospital if you experience fever, chills, severe headache, dizziness, lightheadedness, loss of consciousness, visual disturbance, chest pain, palpitations, shortness of breath,  abdominal pain, nausea, vomiting, diarrhea, blood in your vomit/stool/urine, burning with urination or change in urinary pattern, numbness, weakness, tingling, or other alarming symptoms.        SECONDARY DISCHARGE DIAGNOSES  Diagnosis: HIV disease  Assessment and Plan of Treatment: On admission you reported that you were not taking your HIV medication. Infectious disease were consulted to reestablish care and set you up with the HIV outreach program to help you continue care. Please take your medications as prescribed. Not taking your medication can cause resistance and as a consequence the medication can become ineffective. Additionally, progression of the HIV  can cause your immune system to become weak and uput you at risk of infections.     PRINCIPAL DISCHARGE DIAGNOSIS  Diagnosis: Bilateral leg weakness  Assessment and Plan of Treatment: You were admitted due to worsening lower extremity weakness. Neurology evaluated you and they determined that this is likely progression of the weakness caused by HIV. Initial imaging of the lower back was performed which was normal. Neurology recommended continuing Gabapentin and Baclofen and getting physical therapy/rehabilitation. It is very important that you continue taking your medications for HIV as not taking it can cause your disease to progress in addition to putting you at risks of infections due to a low immune system.  Please return to the hospital if you experience fever, chills, severe headache, dizziness, lightheadedness, loss of consciousness, visual disturbance, chest pain, palpitations, shortness of breath,  abdominal pain, nausea, vomiting, diarrhea, blood in your vomit/stool/urine, burning with urination or change in urinary pattern, numbness, weakness, tingling, or other alarming symptoms.        SECONDARY DISCHARGE DIAGNOSES  Diagnosis: HIV disease  Assessment and Plan of Treatment: On admission you reported that you were not taking your HIV medication. Infectious disease were consulted to reestablish care and set you up with the HIV outreach program to help you continue care. Please take your medications as prescribed. Not taking your medication can cause resistance of the HIV virus and as a consequence the medication can become ineffective. Additionally, progression of the HIV can cause your immune system to become weak and put you at risk of infections (AIDS).

## 2022-08-15 LAB
ANION GAP SERPL CALC-SCNC: 10 MMOL/L — SIGNIFICANT CHANGE UP (ref 7–14)
AUTO DIFF PNL BLD: NEGATIVE — SIGNIFICANT CHANGE UP
BASOPHILS # BLD AUTO: 0.02 K/UL — SIGNIFICANT CHANGE UP (ref 0–0.2)
BASOPHILS NFR BLD AUTO: 0.5 % — SIGNIFICANT CHANGE UP (ref 0–2)
BUN SERPL-MCNC: 15 MG/DL — SIGNIFICANT CHANGE UP (ref 7–23)
C-ANCA SER-ACNC: NEGATIVE — SIGNIFICANT CHANGE UP
CALCIUM SERPL-MCNC: 8.9 MG/DL — SIGNIFICANT CHANGE UP (ref 8.4–10.5)
CHLORIDE SERPL-SCNC: 106 MMOL/L — SIGNIFICANT CHANGE UP (ref 98–107)
CO2 SERPL-SCNC: 23 MMOL/L — SIGNIFICANT CHANGE UP (ref 22–31)
CREAT SERPL-MCNC: 0.77 MG/DL — SIGNIFICANT CHANGE UP (ref 0.5–1.3)
EGFR: 121 ML/MIN/1.73M2 — SIGNIFICANT CHANGE UP
EOSINOPHIL # BLD AUTO: 0.05 K/UL — SIGNIFICANT CHANGE UP (ref 0–0.5)
EOSINOPHIL NFR BLD AUTO: 1.2 % — SIGNIFICANT CHANGE UP (ref 0–6)
GLUCOSE SERPL-MCNC: 90 MG/DL — SIGNIFICANT CHANGE UP (ref 70–99)
HCT VFR BLD CALC: 39.5 % — SIGNIFICANT CHANGE UP (ref 39–50)
HGB BLD-MCNC: 12.9 G/DL — LOW (ref 13–17)
IANC: 1.1 K/UL — LOW (ref 1.8–7.4)
IMM GRANULOCYTES NFR BLD AUTO: 0.5 % — SIGNIFICANT CHANGE UP (ref 0–1.5)
LYMPHOCYTES # BLD AUTO: 2.53 K/UL — SIGNIFICANT CHANGE UP (ref 1–3.3)
LYMPHOCYTES # BLD AUTO: 58.7 % — HIGH (ref 13–44)
MAGNESIUM SERPL-MCNC: 1.4 MG/DL — LOW (ref 1.6–2.6)
MCHC RBC-ENTMCNC: 29.2 PG — SIGNIFICANT CHANGE UP (ref 27–34)
MCHC RBC-ENTMCNC: 32.7 GM/DL — SIGNIFICANT CHANGE UP (ref 32–36)
MCV RBC AUTO: 89.4 FL — SIGNIFICANT CHANGE UP (ref 80–100)
MONOCYTES # BLD AUTO: 0.59 K/UL — SIGNIFICANT CHANGE UP (ref 0–0.9)
MONOCYTES NFR BLD AUTO: 13.7 % — SIGNIFICANT CHANGE UP (ref 2–14)
NEUTROPHILS # BLD AUTO: 1.1 K/UL — LOW (ref 1.8–7.4)
NEUTROPHILS NFR BLD AUTO: 25.4 % — LOW (ref 43–77)
NRBC # BLD: 0 /100 WBCS — SIGNIFICANT CHANGE UP
NRBC # FLD: 0 K/UL — SIGNIFICANT CHANGE UP
P-ANCA SER-ACNC: NEGATIVE — SIGNIFICANT CHANGE UP
PHOSPHATE SERPL-MCNC: 3.7 MG/DL — SIGNIFICANT CHANGE UP (ref 2.5–4.5)
PLATELET # BLD AUTO: 219 K/UL — SIGNIFICANT CHANGE UP (ref 150–400)
POTASSIUM SERPL-MCNC: 4.1 MMOL/L — SIGNIFICANT CHANGE UP (ref 3.5–5.3)
POTASSIUM SERPL-SCNC: 4.1 MMOL/L — SIGNIFICANT CHANGE UP (ref 3.5–5.3)
PYRIDOXAL PHOS SERPL-MCNC: 9.3 UG/L — SIGNIFICANT CHANGE UP (ref 3.4–65.2)
RBC # BLD: 4.42 M/UL — SIGNIFICANT CHANGE UP (ref 4.2–5.8)
RBC # FLD: 13.3 % — SIGNIFICANT CHANGE UP (ref 10.3–14.5)
SARS-COV-2 RNA SPEC QL NAA+PROBE: SIGNIFICANT CHANGE UP
SODIUM SERPL-SCNC: 139 MMOL/L — SIGNIFICANT CHANGE UP (ref 135–145)
WBC # BLD: 4.31 K/UL — SIGNIFICANT CHANGE UP (ref 3.8–10.5)
WBC # FLD AUTO: 4.31 K/UL — SIGNIFICANT CHANGE UP (ref 3.8–10.5)
ZINC SERPL-MCNC: 86 UG/DL — SIGNIFICANT CHANGE UP (ref 44–115)

## 2022-08-15 PROCEDURE — 99232 SBSQ HOSP IP/OBS MODERATE 35: CPT | Mod: GC

## 2022-08-15 RX ORDER — MAGNESIUM SULFATE 500 MG/ML
1 VIAL (ML) INJECTION ONCE
Refills: 0 | Status: COMPLETED | OUTPATIENT
Start: 2022-08-15 | End: 2022-08-15

## 2022-08-15 RX ADMIN — Medication 50 MILLIGRAM(S): at 22:10

## 2022-08-15 RX ADMIN — QUETIAPINE FUMARATE 200 MILLIGRAM(S): 200 TABLET, FILM COATED ORAL at 22:10

## 2022-08-15 RX ADMIN — Medication 650 MILLIGRAM(S): at 11:30

## 2022-08-15 RX ADMIN — BICTEGRAVIR SODIUM, EMTRICITABINE, AND TENOFOVIR ALAFENAMIDE FUMARATE 1 TABLET(S): 30; 120; 15 TABLET ORAL at 11:30

## 2022-08-15 RX ADMIN — Medication 20 MILLIGRAM(S): at 07:02

## 2022-08-15 RX ADMIN — ENOXAPARIN SODIUM 40 MILLIGRAM(S): 100 INJECTION SUBCUTANEOUS at 11:30

## 2022-08-15 RX ADMIN — Medication 20 MILLIGRAM(S): at 22:10

## 2022-08-15 RX ADMIN — Medication 100 GRAM(S): at 10:53

## 2022-08-15 RX ADMIN — Medication 650 MILLIGRAM(S): at 12:30

## 2022-08-15 RX ADMIN — Medication 50 MILLIGRAM(S): at 14:55

## 2022-08-15 RX ADMIN — Medication 50 MILLIGRAM(S): at 07:03

## 2022-08-15 RX ADMIN — Medication 6 MILLIGRAM(S): at 22:10

## 2022-08-15 RX ADMIN — Medication 20 MILLIGRAM(S): at 14:52

## 2022-08-15 NOTE — PROGRESS NOTE ADULT - PROBLEM SELECTOR PLAN 1
Acute on chronic lower extremity weakness over past 2 days, associated with spasms, hyperreflexia.  HIV myelopathy vs transverse myelitis vs other demyelinating disorder vs less likely AIDP   - Negative CT head, CT lumbar and thoracic  - F/u B6, Zn, HTLV, SHAI, MICHEAL, ANCA, MMA, anti-intrinsic factor Ab  - CCP, HTLV, urine ca++, ACE, copper, Folate, B12, homocysteine, syphilis, HgbA1c, TSH, ESR, CRP, Mg WNL  - Seen by PT and OT  - monitor respiratory status  - f/u neuro recs  - will discharge to subacute rehab per OT Acute on chronic lower extremity weakness over past 2 days, associated with spasms, hyperreflexia.  HIV myelopathy vs transverse myelitis vs other demyelinating disorder vs less likely AIDP   - Negative CT head, CT lumbar and thoracic  - F/u B6, Zn, HTLV, SHAI, MICHEAL, ANCA, MMA, anti-intrinsic factor Ab  - CCP, HTLV, urine ca++, ACE, copper, Folate, B12, homocysteine, syphilis, HgbA1c, TSH, ESR, CRP, Mg WNL  - Seen by PT and OT  - monitor respiratory status  - f/u neuro recs  - PT/OT recommended d/c to subacute rehab. Patient is now refuse BRAYDEN and would rather have home PT. Plan to d/c tomorrow with home PT

## 2022-08-15 NOTE — PROGRESS NOTE ADULT - PROBLEM SELECTOR PLAN 6
Diet: regular  DVT Prophylaxis: lovenox 40 mg PO QD  Dispo: HIV outreach program on board.   OT recommends BRAYDEN, pending rehab placement Diet: regular  DVT Prophylaxis: lovenox 40 mg PO QD  Dispo: HIV outreach program on board.   OT recommends BRAYDEN, patient refusing BRAYDEN and will be d/c tmrw with home PT

## 2022-08-15 NOTE — PROGRESS NOTE ADULT - ATTENDING COMMENTS
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 Here with unremarkable CT head and t/l spine. Neurology consulted, recs appreciated. Symptoms suspected to be progression of HIV myelopathy. Continued generalized pain. Continues on baclofen per Neuro, along with pregabalin. Avoiding opiates as able. Appreciate ID input regarding HIV, not compliant with meds. Encouraged med adherence, close outpatient ID f/u. Appreciate PT/OT recs, recommending rehab however patient refusing and wants outpt PT. Wants to leave tomorrow as he does not have safe place to go today. Offered shelter placement which patient refused.

## 2022-08-15 NOTE — PROGRESS NOTE ADULT - SUBJECTIVE AND OBJECTIVE BOX
DATE OF SERVICE: 08-15-22 @ 08:55    Patient is a 32y old  Male who presents with a chief complaint of Lower extremity weakness starting 2 days ago, unable to walk (14 Aug 2022 18:21)      SUBJECTIVE / OVERNIGHT EVENTS:    MEDICATIONS  (STANDING):  baclofen 20 milliGRAM(s) Oral every 8 hours  bictegravir 50 mG/emtricitabine 200 mG/tenofovir alafenamide 25 mG (BIKTARVY) 1 Tablet(s) Oral daily  enoxaparin Injectable 40 milliGRAM(s) SubCutaneous every 24 hours  melatonin 6 milliGRAM(s) Oral at bedtime  pregabalin 50 milliGRAM(s) Oral three times a day  QUEtiapine 100 milliGRAM(s) Oral once  QUEtiapine 200 milliGRAM(s) Oral at bedtime    MEDICATIONS  (PRN):  acetaminophen     Tablet .. 650 milliGRAM(s) Oral every 6 hours PRN Temp greater or equal to 38C (100.4F), Mild Pain (1 - 3)  aluminum hydroxide/magnesium hydroxide/simethicone Suspension 30 milliLiter(s) Oral every 4 hours PRN Dyspepsia  melatonin 3 milliGRAM(s) Oral at bedtime PRN Insomnia  ondansetron Injectable 4 milliGRAM(s) IV Push every 8 hours PRN Nausea and/or Vomiting      Vital Signs Last 24 Hrs  T(C): 36.4 (15 Aug 2022 07:07), Max: 36.7 (14 Aug 2022 21:50)  T(F): 97.5 (15 Aug 2022 07:07), Max: 98.1 (14 Aug 2022 21:50)  HR: 65 (15 Aug 2022 07:07) (65 - 79)  BP: 106/66 (15 Aug 2022 07:07) (106/66 - 121/81)  BP(mean): --  RR: 18 (15 Aug 2022 07:07) (18 - 18)  SpO2: 100% (15 Aug 2022 07:07) (100% - 100%)    Parameters below as of 15 Aug 2022 07:07  Patient On (Oxygen Delivery Method): room air      CAPILLARY BLOOD GLUCOSE        I&O's Summary    14 Aug 2022 07:01  -  15 Aug 2022 07:00  --------------------------------------------------------  IN: 0 mL / OUT: 1250 mL / NET: -1250 mL        PHYSICAL EXAM:  GENERAL: NAD, well-developed  HEAD:  Atraumatic, Normocephalic  EYES: EOMI, PERRLA, conjunctiva and sclera clear  NECK: Supple, No JVD  CHEST/LUNG: Clear to auscultation bilaterally; No wheeze  HEART: Regular rate and rhythm; No murmurs, rubs, or gallops  ABDOMEN: Soft, Nontender, Nondistended; Bowel sounds present  EXTREMITIES:  2+ Peripheral Pulses, No clubbing, cyanosis, or edema  PSYCH: AAOx3  NEUROLOGY: non-focal  SKIN: No rashes or lesions    LABS:                        12.9   4.31  )-----------( 219      ( 15 Aug 2022 06:58 )             39.5     08-15    139  |  106  |  15  ----------------------------<  90  4.1   |  23  |  0.77    Ca    8.9      15 Aug 2022 06:58  Phos  3.7     08-15  Mg     1.40     08-15                RADIOLOGY & ADDITIONAL TESTS:    Imaging Personally Reviewed:    Consultant(s) Notes Reviewed:      Care Discussed with Consultants/Other Providers:   DATE OF SERVICE: 08-15-22 @ 08:55    Patient is a 32y old  Male who presents with a chief complaint of Lower extremity weakness starting 2 days ago, unable to walk (14 Aug 2022 18:21)      SUBJECTIVE / OVERNIGHT EVENTS: No acute events overnight. Patient received Tramadol for jaw pain. He denies any new symptoms.     MEDICATIONS  (STANDING):  baclofen 20 milliGRAM(s) Oral every 8 hours  bictegravir 50 mG/emtricitabine 200 mG/tenofovir alafenamide 25 mG (BIKTARVY) 1 Tablet(s) Oral daily  enoxaparin Injectable 40 milliGRAM(s) SubCutaneous every 24 hours  melatonin 6 milliGRAM(s) Oral at bedtime  pregabalin 50 milliGRAM(s) Oral three times a day  QUEtiapine 100 milliGRAM(s) Oral once  QUEtiapine 200 milliGRAM(s) Oral at bedtime    MEDICATIONS  (PRN):  acetaminophen     Tablet .. 650 milliGRAM(s) Oral every 6 hours PRN Temp greater or equal to 38C (100.4F), Mild Pain (1 - 3)  aluminum hydroxide/magnesium hydroxide/simethicone Suspension 30 milliLiter(s) Oral every 4 hours PRN Dyspepsia  melatonin 3 milliGRAM(s) Oral at bedtime PRN Insomnia  ondansetron Injectable 4 milliGRAM(s) IV Push every 8 hours PRN Nausea and/or Vomiting      Vital Signs Last 24 Hrs  T(C): 36.4 (15 Aug 2022 07:07), Max: 36.7 (14 Aug 2022 21:50)  T(F): 97.5 (15 Aug 2022 07:07), Max: 98.1 (14 Aug 2022 21:50)  HR: 65 (15 Aug 2022 07:07) (65 - 79)  BP: 106/66 (15 Aug 2022 07:07) (106/66 - 121/81)  BP(mean): --  RR: 18 (15 Aug 2022 07:07) (18 - 18)  SpO2: 100% (15 Aug 2022 07:07) (100% - 100%)    Parameters below as of 15 Aug 2022 07:07  Patient On (Oxygen Delivery Method): room air      CAPILLARY BLOOD GLUCOSE        I&O's Summary    14 Aug 2022 07:01  -  15 Aug 2022 07:00  --------------------------------------------------------  IN: 0 mL / OUT: 1250 mL / NET: -1250 mL        PHYSICAL EXAM:  GENERAL: NAD, well-developed  HEAD:  Atraumatic, Normocephalic  EYES: EOMI, PERRLA, conjunctiva and sclera clear  NECK: Supple, No JVD  CHEST/LUNG: Clear to auscultation bilaterally; No wheeze  HEART: Regular rate and rhythm; No murmurs, rubs, or gallops  ABDOMEN: Soft, Nontender, Nondistended; Bowel sounds present  EXTREMITIES:  2+ Peripheral Pulses, No clubbing, cyanosis, or edema  PSYCH: AAOx3  NEUROLOGY: non-focal    Strength:                H-Flex	H-Extension	K-Ext	D-Flex	P-Flex  R	3	3		3+	3+	3+	 	   L	3	3		3+	3	3		       Reflexes:              Biceps(C5)       BR(C6)     Triceps(C7)               Patellar(L4)    Achilles(S1)     R	2	          2	             2		        3		    3  L	2	          2	             2		        3		    3    SKIN: No rashes or lesions    LABS:                        12.9   4.31  )-----------( 219      ( 15 Aug 2022 06:58 )             39.5     08-15    139  |  106  |  15  ----------------------------<  90  4.1   |  23  |  0.77    Ca    8.9      15 Aug 2022 06:58  Phos  3.7     08-15  Mg     1.40     08-15                RADIOLOGY & ADDITIONAL TESTS:    Imaging Personally Reviewed:    Consultant(s) Notes Reviewed:      Care Discussed with Consultants/Other Providers:

## 2022-08-15 NOTE — PROGRESS NOTE ADULT - PROBLEM SELECTOR PLAN 3
- Last CD4 415 and HIV Viral load 5328 (3/22)  - Has not been adherent to medications  - HTLV wnl  - Quant gold negative  - ,000, CD4 - 207, 11% (8/12)  [] discuss starting PCP prophylaxis with ID given CD4 <14%.   - Hep C - neg ab  - SHAI positive, possibly 2/2 HIV (seen in ~25% of patients with HIV)  - Trend daily WBC, temp, LFTs, creat  - C/w biktarvy  - f/u genosure  - F/u ID recommendations  - seen by HIV outreach program - Last CD4 415 and HIV Viral load 5328 (3/22)  - Has not been adherent to medications  - HTLV wnl  - Quant gold negative  - ,000, CD4 - 207, 11% (8/12). PCP ppx held per ID.   - Hep C - neg ab  - SHAI positive, possibly 2/2 HIV (seen in ~25% of patients with HIV)  - Trend daily WBC, temp, LFTs, creat  - C/w biktarvy  - f/u genosure  - F/u ID recommendations  - seen by HIV outreach program
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
## 2022-08-16 ENCOUNTER — TRANSCRIPTION ENCOUNTER (OUTPATIENT)
Age: 32
End: 2022-08-16

## 2022-08-16 VITALS — WEIGHT: 169.54 LBS

## 2022-08-16 LAB
ANION GAP SERPL CALC-SCNC: 12 MMOL/L — SIGNIFICANT CHANGE UP (ref 7–14)
BASOPHILS # BLD AUTO: 0.01 K/UL — SIGNIFICANT CHANGE UP (ref 0–0.2)
BASOPHILS NFR BLD AUTO: 0.3 % — SIGNIFICANT CHANGE UP (ref 0–2)
BUN SERPL-MCNC: 12 MG/DL — SIGNIFICANT CHANGE UP (ref 7–23)
CALCIUM SERPL-MCNC: 9.2 MG/DL — SIGNIFICANT CHANGE UP (ref 8.4–10.5)
CHLORIDE SERPL-SCNC: 104 MMOL/L — SIGNIFICANT CHANGE UP (ref 98–107)
CO2 SERPL-SCNC: 24 MMOL/L — SIGNIFICANT CHANGE UP (ref 22–31)
CREAT SERPL-MCNC: 0.77 MG/DL — SIGNIFICANT CHANGE UP (ref 0.5–1.3)
EGFR: 121 ML/MIN/1.73M2 — SIGNIFICANT CHANGE UP
EOSINOPHIL # BLD AUTO: 0.04 K/UL — SIGNIFICANT CHANGE UP (ref 0–0.5)
EOSINOPHIL NFR BLD AUTO: 1 % — SIGNIFICANT CHANGE UP (ref 0–6)
GLUCOSE SERPL-MCNC: 99 MG/DL — SIGNIFICANT CHANGE UP (ref 70–99)
HCT VFR BLD CALC: 41 % — SIGNIFICANT CHANGE UP (ref 39–50)
HGB BLD-MCNC: 12.9 G/DL — LOW (ref 13–17)
IANC: 0.84 K/UL — LOW (ref 1.8–7.4)
IMM GRANULOCYTES NFR BLD AUTO: 0.3 % — SIGNIFICANT CHANGE UP (ref 0–1.5)
LYMPHOCYTES # BLD AUTO: 2.59 K/UL — SIGNIFICANT CHANGE UP (ref 1–3.3)
LYMPHOCYTES # BLD AUTO: 66.8 % — HIGH (ref 13–44)
MAGNESIUM SERPL-MCNC: 1.5 MG/DL — LOW (ref 1.6–2.6)
MCHC RBC-ENTMCNC: 28.6 PG — SIGNIFICANT CHANGE UP (ref 27–34)
MCHC RBC-ENTMCNC: 31.5 GM/DL — LOW (ref 32–36)
MCV RBC AUTO: 90.9 FL — SIGNIFICANT CHANGE UP (ref 80–100)
MONOCYTES # BLD AUTO: 0.39 K/UL — SIGNIFICANT CHANGE UP (ref 0–0.9)
MONOCYTES NFR BLD AUTO: 10.1 % — SIGNIFICANT CHANGE UP (ref 2–14)
NEUTROPHILS # BLD AUTO: 0.84 K/UL — LOW (ref 1.8–7.4)
NEUTROPHILS NFR BLD AUTO: 21.5 % — LOW (ref 43–77)
NRBC # BLD: 0 /100 WBCS — SIGNIFICANT CHANGE UP
NRBC # FLD: 0 K/UL — SIGNIFICANT CHANGE UP
PHOSPHATE SERPL-MCNC: 4.3 MG/DL — SIGNIFICANT CHANGE UP (ref 2.5–4.5)
PLATELET # BLD AUTO: 233 K/UL — SIGNIFICANT CHANGE UP (ref 150–400)
POTASSIUM SERPL-MCNC: 4 MMOL/L — SIGNIFICANT CHANGE UP (ref 3.5–5.3)
POTASSIUM SERPL-SCNC: 4 MMOL/L — SIGNIFICANT CHANGE UP (ref 3.5–5.3)
RBC # BLD: 4.51 M/UL — SIGNIFICANT CHANGE UP (ref 4.2–5.8)
RBC # FLD: 13.4 % — SIGNIFICANT CHANGE UP (ref 10.3–14.5)
SODIUM SERPL-SCNC: 140 MMOL/L — SIGNIFICANT CHANGE UP (ref 135–145)
WBC # BLD: 3.88 K/UL — SIGNIFICANT CHANGE UP (ref 3.8–10.5)
WBC # FLD AUTO: 3.88 K/UL — SIGNIFICANT CHANGE UP (ref 3.8–10.5)

## 2022-08-16 PROCEDURE — 99239 HOSP IP/OBS DSCHRG MGMT >30: CPT | Mod: GC

## 2022-08-16 PROCEDURE — 90792 PSYCH DIAG EVAL W/MED SRVCS: CPT

## 2022-08-16 RX ORDER — QUETIAPINE FUMARATE 200 MG/1
1 TABLET, FILM COATED ORAL
Qty: 30 | Refills: 0
Start: 2022-08-16 | End: 2022-09-14

## 2022-08-16 RX ORDER — BACLOFEN 100 %
2 POWDER (GRAM) MISCELLANEOUS
Qty: 180 | Refills: 0
Start: 2022-08-16 | End: 2022-09-14

## 2022-08-16 RX ORDER — MAGNESIUM OXIDE 400 MG ORAL TABLET 241.3 MG
1 TABLET ORAL
Qty: 30 | Refills: 0
Start: 2022-08-16 | End: 2022-09-14

## 2022-08-16 RX ORDER — BACLOFEN 100 %
1 POWDER (GRAM) MISCELLANEOUS
Qty: 0 | Refills: 0 | DISCHARGE

## 2022-08-16 RX ORDER — MAGNESIUM SULFATE 500 MG/ML
2 VIAL (ML) INJECTION ONCE
Refills: 0 | Status: COMPLETED | OUTPATIENT
Start: 2022-08-16 | End: 2022-08-16

## 2022-08-16 RX ORDER — BICTEGRAVIR SODIUM, EMTRICITABINE, AND TENOFOVIR ALAFENAMIDE FUMARATE 30; 120; 15 MG/1; MG/1; MG/1
1 TABLET ORAL
Qty: 30 | Refills: 0
Start: 2022-08-16 | End: 2022-09-14

## 2022-08-16 RX ORDER — MAGNESIUM OXIDE 400 MG ORAL TABLET 241.3 MG
1 TABLET ORAL
Qty: 0 | Refills: 0 | DISCHARGE

## 2022-08-16 RX ORDER — QUETIAPINE FUMARATE 200 MG/1
1 TABLET, FILM COATED ORAL
Qty: 0 | Refills: 0 | DISCHARGE

## 2022-08-16 RX ADMIN — ENOXAPARIN SODIUM 40 MILLIGRAM(S): 100 INJECTION SUBCUTANEOUS at 11:34

## 2022-08-16 RX ADMIN — Medication 50 MILLIGRAM(S): at 05:49

## 2022-08-16 RX ADMIN — Medication 20 MILLIGRAM(S): at 13:36

## 2022-08-16 RX ADMIN — Medication 20 MILLIGRAM(S): at 05:50

## 2022-08-16 RX ADMIN — Medication 25 GRAM(S): at 10:31

## 2022-08-16 RX ADMIN — BICTEGRAVIR SODIUM, EMTRICITABINE, AND TENOFOVIR ALAFENAMIDE FUMARATE 1 TABLET(S): 30; 120; 15 TABLET ORAL at 11:34

## 2022-08-16 RX ADMIN — Medication 50 MILLIGRAM(S): at 13:37

## 2022-08-16 NOTE — PROGRESS NOTE ADULT - PROBLEM SELECTOR PLAN 6
Diet: regular  DVT Prophylaxis: lovenox 40 mg PO QD  Dispo: HIV outreach program on board.   OT recommends BRAYDEN, patient refusing BRAYDEN and will be d/c tmrw with home PT Diet: regular  DVT Prophylaxis: lovenox 40 mg PO QD  Dispo: HIV outreach program on board.   OT recommends BRAYDEN, patient refusing BRAYDEN and will be d/c today with home PT

## 2022-08-16 NOTE — DIETITIAN INITIAL EVALUATION ADULT - PERTINENT MEDS FT
MEDICATIONS  (STANDING):  baclofen 20 milliGRAM(s) Oral every 8 hours  bictegravir 50 mG/emtricitabine 200 mG/tenofovir alafenamide 25 mG (BIKTARVY) 1 Tablet(s) Oral daily  enoxaparin Injectable 40 milliGRAM(s) SubCutaneous every 24 hours  melatonin 6 milliGRAM(s) Oral at bedtime  pregabalin 50 milliGRAM(s) Oral three times a day  QUEtiapine 100 milliGRAM(s) Oral once  QUEtiapine 200 milliGRAM(s) Oral at bedtime    MEDICATIONS  (PRN):  acetaminophen     Tablet .. 650 milliGRAM(s) Oral every 6 hours PRN Temp greater or equal to 38C (100.4F), Mild Pain (1 - 3)  aluminum hydroxide/magnesium hydroxide/simethicone Suspension 30 milliLiter(s) Oral every 4 hours PRN Dyspepsia  melatonin 3 milliGRAM(s) Oral at bedtime PRN Insomnia  ondansetron Injectable 4 milliGRAM(s) IV Push every 8 hours PRN Nausea and/or Vomiting

## 2022-08-16 NOTE — DISCHARGE NOTE NURSING/CASE MANAGEMENT/SOCIAL WORK - NSDCVIVACCINE_GEN_ALL_CORE_FT
Tdap; 09-Jul-2022 05:12; Annabelle Curiel (RN); [employer]; [id number] (Exp. Date: 11-Mar-2024); IntraMuscular; Deltoid Right.; 0.5 milliLiter(s); VIS (VIS Published: 09-May-2013, VIS Presented: 09-Jul-2022);

## 2022-08-16 NOTE — PROGRESS NOTE ADULT - PROBLEM SELECTOR PLAN 3
- Last CD4 415 and HIV Viral load 5328 (3/22)  - Has not been adherent to medications  - HTLV wnl  - Quant gold negative  - ,000, CD4 - 207, 11% (8/12). PCP ppx held per ID.   - Hep C - neg ab  - SHAI positive, possibly 2/2 HIV (seen in ~25% of patients with HIV)  - Trend daily WBC, temp, LFTs, creat  - C/w biktarvy  - f/u genosure  - F/u ID recommendations  - seen by HIV outreach program - 3/22 CD4 415 and HIV Viral load 5328  - Has not been adherent to medications  - HTLV wnl  - Quant gold negative  - ,000, CD4 - 207, 11% (8/12). PCP ppx held per ID.   - Hep C - neg ab  - SHAI positive, possibly 2/2 HIV (seen in ~25% of patients with HIV)  - Trend daily WBC, temp, LFTs, creat  - C/w biktarvy  - f/u genosure  - F/u ID recommendations  - seen by HIV outreach program
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
## 2022-08-16 NOTE — DIETITIAN INITIAL EVALUATION ADULT - ORAL INTAKE PTA/DIET HISTORY
Pt states he does not have difficulty obtaining groceries at this time, he has food stamps and using them to help him get food/groceries. No specific diet followed PTA. No supplements/Vitamins taken PTA.

## 2022-08-16 NOTE — PROGRESS NOTE ADULT - PROBLEM SELECTOR PROBLEM 6
Prophylactic measure
Emotional instability in adult
Prophylactic measure

## 2022-08-16 NOTE — DISCHARGE NOTE NURSING/CASE MANAGEMENT/SOCIAL WORK - NSDCPNINST_GEN_ALL_CORE
Patient received alert and stable, denies pain or discomfort, medications given as ordered, IV access discontinued per hospital policy, discharged home safely.

## 2022-08-16 NOTE — BH CONSULTATION LIAISON ASSESSMENT NOTE - NSBHATTESTCOMMENTATTENDFT_PSY_A_CORE
Chart reviewed, pt. seen/evaluated with trainee, I agree with above assessment/plan. Patient oriented, well engaged, euthymic, reports feeling depressed, denies SIIP,  denies HIIP, no evidence of carly or psychosis, he is hopeful/treatment seeking/future oriented, amenable to follow up with outpatient psychiatry, plan as above.

## 2022-08-16 NOTE — PROGRESS NOTE ADULT - PROBLEM SELECTOR PROBLEM 2
HIV-associated myelopathy

## 2022-08-16 NOTE — PROGRESS NOTE ADULT - REASON FOR ADMISSION
Lower extremity weakness starting 2 days ago, unable to walk
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
## 2022-08-16 NOTE — PROGRESS NOTE ADULT - ASSESSMENT
32 year old with hx of HIV, HIV myelopathy, asthma, afib who is here for acute on chronic lower extremity weakness of 2 day duration. Associated with fecal and urinary incontinence. Physical exam is significant for lower extremity weakness, muscle spasms, and hyperreflexia. Negative CT Head, no findings on CT lumbar and thoracic. Patient's acute on chronic exacerbation of lower extremity weakness is likely secondary to HIV myelopathy vs transverse myelitis vs other demyelinating disorder vs less likely AIDP.     
32M with asthma, congenital HIV infection, HIV myelopathy, intermittently adherent to ARV who was admitted 8/9 c/o acute on chronic worsening lower extremity weakness starting 2 days PTA.  He also has urinary retention and incontinence and fecal incontinence. He denies any saddle paresthesias. He has no recent viral, flu-like illness associated with fever, muscle aches, cough, sore throat or diarrhea in the past few months. He has no recent physical trauma.      HIV  - continue Biktarvy  - f/u Tcells and VL  - f/u genosure archive  - syphilis screen negative  - Appreciate  from ID office who came to see patient this morning  - to see Dr. Hunter when he returns    Mental Health  - would have behavioral health see patient if patient is amenable
32 year old with hx of HIV, HIV myelopathy, asthma, afib who is here for acute on chronic lower extremity weakness of 2 day duration. Associated with fecal and urinary incontinence. Physical exam is significant for lower extremity weakness, muscle spasms, and hyperreflexia. Negative CT Head, no findings on CT lumbar and thoracic. Patient's acute on chronic exacerbation of lower extremity weakness is likely secondary to HIV myelopathy vs transverse myelitis vs other demyelinating disorder vs less likely AIDP.     

## 2022-08-16 NOTE — DIETITIAN INITIAL EVALUATION ADULT - OTHER INFO
Per chart, 32 year old male with hx of HIV, HIV myelopathy, asthma, afib who is here for acute on chronic lower extremity weakness of 2 day duration. Associated with fecal and urinary incontinence. Physical exam is significant for lower extremity weakness, muscle spasms, and hyperreflexia. Negative CT Head, no findings on CT lumbar and thoracic. Patient's acute on chronic exacerbation of lower extremity weakness is likely secondary to HIV myelopathy vs transverse myelitis vs other demyelinating disorder vs less likely AIDP.     Nutrition interview: No recent episodes of nausea, vomiting, diarrhea or constipation, BM noted today per Pt. Denies any chewing/swallowing difficulties. No food allergies. Stated UBW: ~170#, in line with current wt. Food preferences explored and noted. Intake is % per RN flowsheets and per pt. Feeding skills: independent.     RD offered nutrition education, pt declined at this time.

## 2022-08-16 NOTE — BH CONSULTATION LIAISON ASSESSMENT NOTE - NSBHCONSULTFOLLOWAFTERCARE_PSY_A_CORE FT
Patient can follow up with [facility] [phone]. [facility] outpt. walk in clinic : [phone]   [facility] Outpatient clinic: [phone]

## 2022-08-16 NOTE — DISCHARGE NOTE NURSING/CASE MANAGEMENT/SOCIAL WORK - NSDCPEFALRISK_GEN_ALL_CORE
For information on Fall & Injury Prevention, visit: [email or web address] OR  [email or web address] OR  https://www.cdc.gov/steadi/patient.html

## 2022-08-16 NOTE — BH CONSULTATION LIAISON ASSESSMENT NOTE - NSBHCHARTREVIEWVS_PSY_A_CORE FT
Vital Signs Last 24 Hrs  T(C): 36.8 (16 Aug 2022 05:50), Max: 36.8 (16 Aug 2022 05:50)  T(F): 98.3 (16 Aug 2022 05:50), Max: 98.3 (16 Aug 2022 05:50)  HR: 67 (16 Aug 2022 05:50) (67 - 76)  BP: 104/65 (16 Aug 2022 05:50) (104/65 - 141/72)  BP(mean): --  RR: 16 (16 Aug 2022 05:50) (16 - 18)  SpO2: 100% (16 Aug 2022 05:50) (100% - 100%)    Parameters below as of 15 Aug 2022 22:13  Patient On (Oxygen Delivery Method): room air

## 2022-08-16 NOTE — PROGRESS NOTE ADULT - PROBLEM SELECTOR PLAN 1
Acute on chronic lower extremity weakness over past 2 days, associated with spasms, hyperreflexia.  HIV myelopathy vs transverse myelitis vs other demyelinating disorder vs less likely AIDP   - Negative CT head, CT lumbar and thoracic  - F/u B6, Zn, HTLV, SHAI, MICHEAL, ANCA, MMA, anti-intrinsic factor Ab  - CCP, HTLV, urine ca++, ACE, copper, Folate, B12, homocysteine, syphilis, HgbA1c, TSH, ESR, CRP, Mg WNL  - Seen by PT and OT  - monitor respiratory status  - f/u neuro recs  - PT/OT recommended d/c to subacute rehab. Patient is now refuse BRAYDEN and would rather have home PT. Plan to d/c tomorrow with home PT Acute on chronic lower extremity weakness over past 2 days, associated with spasms, hyperreflexia.  HIV myelopathy vs transverse myelitis vs other demyelinating disorder vs less likely AIDP   - Negative CT head, CT lumbar and thoracic  - F/u B6, Zn, HTLV, SHAI, MICHEAL, ANCA, MMA, anti-intrinsic factor Ab  - CCP, HTLV, urine ca++, ACE, copper, Folate, B12, homocysteine, syphilis, HgbA1c, TSH, ESR, CRP, Mg WNL  - Seen by PT and OT  - monitor respiratory status  - f/u neuro recs  - PT/OT recommended d/c to subacute rehab. Patient is now refusing BRAYDEN and would rather have home PT. Plan to d/c tomorrow with home PT

## 2022-08-16 NOTE — PROGRESS NOTE ADULT - SUBJECTIVE AND OBJECTIVE BOX
DATE OF SERVICE: 08-16-22 @ 06:43    Patient is a 32y old  Male who presents with a chief complaint of Lower extremity weakness starting 2 days ago, unable to walk (15 Aug 2022 08:55)      SUBJECTIVE / OVERNIGHT EVENTS:    MEDICATIONS  (STANDING):  baclofen 20 milliGRAM(s) Oral every 8 hours  bictegravir 50 mG/emtricitabine 200 mG/tenofovir alafenamide 25 mG (BIKTARVY) 1 Tablet(s) Oral daily  enoxaparin Injectable 40 milliGRAM(s) SubCutaneous every 24 hours  melatonin 6 milliGRAM(s) Oral at bedtime  pregabalin 50 milliGRAM(s) Oral three times a day  QUEtiapine 100 milliGRAM(s) Oral once  QUEtiapine 200 milliGRAM(s) Oral at bedtime    MEDICATIONS  (PRN):  acetaminophen     Tablet .. 650 milliGRAM(s) Oral every 6 hours PRN Temp greater or equal to 38C (100.4F), Mild Pain (1 - 3)  aluminum hydroxide/magnesium hydroxide/simethicone Suspension 30 milliLiter(s) Oral every 4 hours PRN Dyspepsia  melatonin 3 milliGRAM(s) Oral at bedtime PRN Insomnia  ondansetron Injectable 4 milliGRAM(s) IV Push every 8 hours PRN Nausea and/or Vomiting      Vital Signs Last 24 Hrs  T(C): 36.8 (16 Aug 2022 05:50), Max: 36.8 (15 Aug 2022 12:30)  T(F): 98.3 (16 Aug 2022 05:50), Max: 98.3 (15 Aug 2022 12:30)  HR: 67 (16 Aug 2022 05:50) (65 - 77)  BP: 104/65 (16 Aug 2022 05:50) (104/65 - 141/72)  BP(mean): --  RR: 16 (16 Aug 2022 05:50) (16 - 18)  SpO2: 100% (16 Aug 2022 05:50) (100% - 100%)    Parameters below as of 15 Aug 2022 22:13  Patient On (Oxygen Delivery Method): room air      CAPILLARY BLOOD GLUCOSE        I&O's Summary    14 Aug 2022 07:01  -  15 Aug 2022 07:00  --------------------------------------------------------  IN: 600 mL / OUT: 1750 mL / NET: -1150 mL    15 Aug 2022 07:01  -  16 Aug 2022 06:43  --------------------------------------------------------  IN: 700 mL / OUT: 600 mL / NET: 100 mL        PHYSICAL EXAM:  GENERAL: NAD, well-developed  HEAD:  Atraumatic, Normocephalic  EYES: EOMI, PERRLA, conjunctiva and sclera clear  NECK: Supple, No JVD  CHEST/LUNG: Clear to auscultation bilaterally; No wheeze  HEART: Regular rate and rhythm; No murmurs, rubs, or gallops  ABDOMEN: Soft, Nontender, Nondistended; Bowel sounds present  EXTREMITIES:  2+ Peripheral Pulses, No clubbing, cyanosis, or edema  PSYCH: AAOx3  NEUROLOGY: non-focal  SKIN: No rashes or lesions    LABS:                        12.9   4.31  )-----------( 219      ( 15 Aug 2022 06:58 )             39.5     08-15    139  |  106  |  15  ----------------------------<  90  4.1   |  23  |  0.77    Ca    8.9      15 Aug 2022 06:58  Phos  3.7     08-15  Mg     1.40     08-15                RADIOLOGY & ADDITIONAL TESTS:    Imaging Personally Reviewed:    Consultant(s) Notes Reviewed:      Care Discussed with Consultants/Other Providers:   DATE OF SERVICE: 08-16-22 @ 06:43    Patient is a 32y old  Male who presents with a chief complaint of Lower extremity weakness starting 2 days ago, unable to walk (15 Aug 2022 08:55)      SUBJECTIVE / OVERNIGHT EVENTS: No acute events overnight. Patient reports continued spasms but believes that he will do well with outpatient PT. Patient feels that the increased dose of baclofen is making him nauseous. He inquired about a cortisone shot since he heard about it years ago. No new CP, SOB, cough, N/V, abdominal pain, dizziness, HA.     MEDICATIONS  (STANDING):  baclofen 20 milliGRAM(s) Oral every 8 hours  bictegravir 50 mG/emtricitabine 200 mG/tenofovir alafenamide 25 mG (BIKTARVY) 1 Tablet(s) Oral daily  enoxaparin Injectable 40 milliGRAM(s) SubCutaneous every 24 hours  melatonin 6 milliGRAM(s) Oral at bedtime  pregabalin 50 milliGRAM(s) Oral three times a day  QUEtiapine 100 milliGRAM(s) Oral once  QUEtiapine 200 milliGRAM(s) Oral at bedtime    MEDICATIONS  (PRN):  acetaminophen     Tablet .. 650 milliGRAM(s) Oral every 6 hours PRN Temp greater or equal to 38C (100.4F), Mild Pain (1 - 3)  aluminum hydroxide/magnesium hydroxide/simethicone Suspension 30 milliLiter(s) Oral every 4 hours PRN Dyspepsia  melatonin 3 milliGRAM(s) Oral at bedtime PRN Insomnia  ondansetron Injectable 4 milliGRAM(s) IV Push every 8 hours PRN Nausea and/or Vomiting      Vital Signs Last 24 Hrs  T(C): 36.8 (16 Aug 2022 05:50), Max: 36.8 (15 Aug 2022 12:30)  T(F): 98.3 (16 Aug 2022 05:50), Max: 98.3 (15 Aug 2022 12:30)  HR: 67 (16 Aug 2022 05:50) (65 - 77)  BP: 104/65 (16 Aug 2022 05:50) (104/65 - 141/72)  BP(mean): --  RR: 16 (16 Aug 2022 05:50) (16 - 18)  SpO2: 100% (16 Aug 2022 05:50) (100% - 100%)    Parameters below as of 15 Aug 2022 22:13  Patient On (Oxygen Delivery Method): room air      CAPILLARY BLOOD GLUCOSE        I&O's Summary    14 Aug 2022 07:01  -  15 Aug 2022 07:00  --------------------------------------------------------  IN: 600 mL / OUT: 1750 mL / NET: -1150 mL    15 Aug 2022 07:01  -  16 Aug 2022 06:43  --------------------------------------------------------  IN: 700 mL / OUT: 600 mL / NET: 100 mL      PHYSICAL EXAM:  GENERAL: NAD, well-developed  HEAD:  Atraumatic, Normocephalic  EYES: EOMI, PERRLA, conjunctiva and sclera clear  NECK: Supple, No JVD  CHEST/LUNG: Clear to auscultation bilaterally; No wheeze  HEART: Regular rate and rhythm; No murmurs, rubs, or gallops  ABDOMEN: Soft, Nontender, Nondistended; Bowel sounds present  EXTREMITIES:  2+ Peripheral Pulses, No clubbing, cyanosis, or edema  PSYCH: AAOx3  NEUROLOGY:     Strength:                H-Flex	H-Extension	K-Ext	D-Flex	P-Flex  R	3+	3		3+	3+	3+	 	   L	3	3		3+	3	3		       Reflexes:              Biceps(C5)       BR(C6)     Triceps(C7)               Patellar(L4)    Achilles(S1)     R	2	          2	             2		        3		    3  L	2	          2	             2		        3		    3    SKIN: No rashes or lesions. Dry skin LE.       LABS:                        12.9   4.31  )-----------( 219      ( 15 Aug 2022 06:58 )             39.5     08-15    139  |  106  |  15  ----------------------------<  90  4.1   |  23  |  0.77    Ca    8.9      15 Aug 2022 06:58  Phos  3.7     08-15  Mg     1.40     08-15                RADIOLOGY & ADDITIONAL TESTS:    Imaging Personally Reviewed:    Consultant(s) Notes Reviewed:      Care Discussed with Consultants/Other Providers:

## 2022-08-16 NOTE — DISCHARGE NOTE NURSING/CASE MANAGEMENT/SOCIAL WORK - PATIENT PORTAL LINK FT
You can access the FollowMyHealth Patient Portal offered by [facility] by registering at the following website: [email or web address]. By joining [facility] [facility]’s FollowMyHealth portal, you will also be able to view your health information using other applications (apps) compatible with our system.

## 2022-08-16 NOTE — DIETITIAN INITIAL EVALUATION ADULT - PERTINENT LABORATORY DATA
08-16 Na 140 mmol/L Glu 99 mg/dL K+ 4.0 mmol/L Cr 0.77 mg/dL BUN 12 mg/dL Phos 4.3 mg/dL    A1C with Estimated Average Glucose Result: 4.6 % (08-09-22 @ 16:00)  A1C with Estimated Average Glucose Result: 4.6 % (03-01-22 @ 03:45)  A1C with Estimated Average Glucose Result: 4.7 % (01-27-22 @ 14:37)

## 2022-08-16 NOTE — BH CONSULTATION LIAISON ASSESSMENT NOTE - SUMMARY
Patient is a 33 yo man, unemployed, previously worked as EMS, undomiciled, 1 dependent (5yo daughter), history of legal issues for elian, patient reported PPHx of ADHD, bipolar disorder, cocaine and marijuana use disorder, no SA, has been on psychotropics in past, now only on Seroquel for sleep, last saw psychiatrist in 2019, PMH of HIV w/myelopathy, asthma, who has admitted for LE weakness. Patient set to be d/c today with home PT.    Upon assessment, patient reports depressed mood possibly 2/2 history of traumas, with increase in nightmares. Patient likely suffering from PTSD with mood disorder. Patient would benefit from psychotropics targeting mood symptoms and medications for nightmares, however given patient is being discharged without close follow up, patient should initiate medications on outpatient basis with outpatient psychiatrist in addition to therapy, to which he was agreeable.    PLAN:  -No safety concerns, no SI/HI no psychiatric contraindications to discharge  -No standing psychotropics for now. Patient would benefit from outpatient treatment for medication management and therapy- [facility] [phone]. 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 Patient set to be d/c today with home PT.    Upon assessment, patient reports depressed mood possibly 2/2 history of traumas, with increase in nightmares. Patient likely suffering from PTSD with mood disorder. Patient would benefit from psychotropics targeting mood symptoms and medications for nightmares, however given patient is being discharged without close follow up, patient should initiate medications on outpatient basis with outpatient psychiatrist in addition to therapy, to which he was agreeable.    PLAN:  -No safety concerns, no SI/HI no psychiatric contraindications to discharge  -Continue Seroquel as written (pt. amenable)  -No additional standing psychotropics for now. Patient would benefit from outpatient treatment for medication management and therapy- [facility] [phone]

## 2022-08-24 NOTE — H&P ADULT - NSHPPHYSICALEXAM_GEN_ALL_CORE
8/24/2022         RE: Ijeoma Shah  [address] [address]        Dear Colleague,    Thank you for referring your patient, Ijeoma Shah, to the [facility]. Please see a copy of my visit note below.       [facility]    PATIENT NAME: Ijeoma Shah  MRN # [id number]   DATE OF VISIT: August 24, 2022  YOB: 1952     CANCER TYPE: SCC lung, poorly differentiated  STAGE: pT4N0 (IIIA)  ECOG PS: 1    PD-L1: TPS 15% on [id number] lobectomy, <1% on [id number] (LLL bx)  Lung panel: SMO [id number] on LLL bx, negative for fusions on lobectomy specimen.   NGS: N/A    SUMMARY  7/3/20  CT chest (screening). 6.7 cm LLL mass, 0.6 cm RML nodule, mediastinal and L hilar LNs  7/9/20 PET/CT. 7.1 x 5.6 cm LLL mass (SUV 19.6), post obstructive pneumonitis LLL inferior to the mass (SUV 2.8), 3.5 x 1.7 cm 4L node (SUV 5.9), L adrenal nodule 1.1 cm (SUV 4), indeterminate pulmonary nodules, pancreatic cysts, not hypermetabolic  7/27/20 Bronch, EBUS (Dr. Castro). 10R, 11R, 4R too small to biopsy. Stations 7, 4L, 11L negative for malignancy. LLL mass bx: SCC  8/12/20 Brain MRI negative  9/1/20 EUS to evaluate adrenal and 4L (Dr. Hogan). Both negative for malignancy. Adrenal with bland adrenal cells  10/22/20 L VATS, converted to thoracotomy, LLL lobectomy, MLND, R pulmonary arterioplasty (Dr. Sanabria, Dr. Davis). 8.3 cm poorly differentiated SCC, +angiolymphatic invasion  12/3/20~2/25/21 Cisplatin gemcitabine x 4. C2D8 delayed 1 week due to neutropenia, added neulasta    ASSESSMENT AND PLAN  SCC lung, [id number], IIIA, R0 resection, discrepant PD-L1: 1 1/2 years after completion of therapy. Nodule on 4:156 a little smaller than last CT, wasn't present on 3/8/22 CT. Otherwise stable.   CT chest abd w/contrast in 3-4 months, labs, TAYLOR appt afterward, appt with me in 8 months with the subsequent scan.    Microscopic hematuria, dysuria: Urine  cytology and FISH negative in March-April, met with Dr. Leyva in Urology, discussed cystoscopy vs monitoring, Salima opted for monitoring.    R L4 nerve root impingement: Better since last year but still with chronic LBP. Will keep an eye on L numbness and maverick horses.    Afib: Per Cardiology    FOBT +laura: Couldn't get colonoscopy due to afib. She will call to reschedule. Order updated.     30 minutes spent on the date of the encounter doing chart review, history and exam, documentation and further activities per the note     Eneida De Leon MD  Associate Professor of Medicine  Hematology, Oncology and Transplantation      SUBJECTIVE  Salima returns for closer interim follow up for SCC lung after new nodules showed up on CT.   Doing well  Left maverick horses, intermittently. Some numbness on the top of her foot as well. Knows she doesn't drink a lot of water.   No other new symptoms    PAST MEDICAL HISTORY  SCC as above  Afib with RVR. Initially when she presented for surgery 10/1, then post-op in 10/2020. DCCV x 2 10/23/20, ibutilide --> NSR, dig load, amio gtt. TTE 10/16/20 unremarkable.   Low back pain, R radiculopathy, L5-S1 interlaminar lumbar epidural steroid injection 11/15/21  Sp cholecystectomy  Dyslipidemia  H/o bronchitis  Nose surgery  Tubal ligation  Adrenal nodule, bx 9/1/20, negative for malignancy  Cholelithiasis  Cataract repair 2011?    PFT 8/20/20 (preop). FEV1 1.33 (58%), FVC 1.76 (60%), DLCO 17.72 (90%)    CURRENT OUTPATIENT MEDICATIONS  Current Outpatient Medications   Medication Sig Dispense Refill     lisinopril (ZESTRIL) 10 MG tablet Take 1 tablet (10 mg) by mouth every morning 90 tablet 1     metoprolol succinate ER (TOPROL XL) 25 MG 24 hr tablet Take 1 tablet (25 mg) by mouth every morning (Patient taking differently: Take 25 mg by mouth At Bedtime) 90 tablet 1     rivaroxaban ANTICOAGULANT (XARELTO) 20 MG TABS tablet Take 1 tablet (20 mg) by mouth daily (with dinner) 90 tablet 3      ALLERGIES  Allergies   Allergen Reactions     Bee Venom Hives     Hot and sweating        REVIEW OF SYSTEMS  As above in the HPI, o/w complete 12-point ROS was negative.    PHYSICAL EXAM  There were no vitals taken for this visit.  GEN: NAD  HEENT: EOMI, no icterus, injection or pallor  NEURO: alert    Remainder of physical exam deferred due to public health emergency and limitations of video visit.    LABORATORY AND IMAGING STUDIES   08/19/22 09:51   Sodium 135   Potassium 4.5   Chloride 101   Carbon Dioxide 31   Urea Nitrogen 15   Creatinine 0.90   GFR Estimate 68   Calcium 9.3   Anion Gap 3   Albumin 3.6   Protein Total 6.9   Alkaline Phosphatase 78   ALT 20   AST 18   Bilirubin Total 0.4   Glucose 129 (H)   WBC 5.9   Hemoglobin 12.7   Hematocrit 38.0   Platelet Count 187   RBC Count 4.50   MCV 84   MCH 28.2   MCHC 33.4   RDW 13.2   % Neutrophils 70   % Lymphocytes 19   % Monocytes 6   % Eosinophils 5   % Basophils 0   Absolute Basophils 0.0   Absolute Eosinophils 0.3   Absolute Immature Granulocytes 0.0   Absolute Lymphocytes 1.1   Absolute Monocytes 0.4   % Immature Granulocytes 0   Absolute Neutrophils 4.1   Absolute NRBCs 0.0   NRBCs per 100 WBC 0     Labs were independently reviewed and interpreted by me    CT Chest w/o Contrast  Narrative: CT CHEST W/O CONTRAST 8/19/2022 10:07 AM    CLINICAL HISTORY: History of left lower lobe lung cancer.    TECHNIQUE: CT chest without IV contrast. Multiplanar reformats were  obtained. Dose reduction techniques were used.  CONTRAST: None.    COMPARISON: 6/8/2022, 3/8/2022    FINDINGS:   LUNGS AND PLEURA: Stable left lower lobectomy benign postoperative  appearance. Stable small focal site to medial left lung base  atelectasis. No evidence of pneumonitis or pleural effusions. Stable 5  mm right middle lobe pulmonary nodule on image 156 of series 4. Stable  3 mm lateral right upper lobe pulmonary nodule on image 68 of series  4. Stable 5 mm nodular density in the lateral  right mid lung adjacent  to the minor fissure on image 190 of series 4. Mild apical pleural  scarring.    MEDIASTINUM/AXILLAE: No lymphadenopathy. No thoracic aortic aneurysm.  No evidence of pericardial effusion. Moderate coronary artery  atherosclerosis.  Stable mild nodularity of the left adrenal gland.  UPPER ABDOMEN: Cholecystectomy. Stable left hepatic lobe cysts.  Suboptimal visualization of the cystic pancreatic lesions due to  absence of intravenous contrast.    MUSCULOSKELETAL: No evidence of pathologic bone lesions. Thoracic  spine dorsal kyphosis and hypertrophic degenerative changes.  Impression: IMPRESSION:   1.  No interval change.  2.  Stable small right lung pulmonary nodules measuring up to 5 mm in  largest size.  3.  Left lower lobectomy.  4.  No evidence of lymphadenopathy.    JOCELYN TIPTON MD         SYSTEM ID:  [id number]     Imaging was personally reviewed and interpreted by me     Sincerely,    Eneida De Leon MD   PHYSICAL EXAM:  T(C): 36.4 (10-26-20 @ 07:24), Max: 36.4 (10-26-20 @ 00:36)  HR: 51 (10-26-20 @ 07:24) (51 - 75)  BP: 113/76 (10-26-20 @ 07:24) (113/76 - 133/68)  RR: 16 (10-26-20 @ 07:24) (16 - 18)  SpO2: 100% (10-26-20 @ 07:24) (99% - 100%)  GENERAL: NAD, well-developed  HEAD:  Atraumatic, Normocephalic  EYES: EOMI, PERRLA, conjunctiva and sclera clear  NECK: Supple, No JVD  CHEST/LUNG: Clear to auscultation bilaterally; No wheeze  HEART: Regular rate and rhythm; No murmurs, rubs, or gallops  ABDOMEN: Soft, Nontender, Nondistended; Bowel sounds present  EXTREMITIES:  2+ Peripheral Pulses, No clubbing, cyanosis, or edema  PSYCH: AAOx3  NEUROLOGY: non-focal  SKIN: No rashes or lesions PHYSICAL EXAM:  T(C): 36.4 (10-26-20 @ 07:24), Max: 36.4 (10-26-20 @ 00:36)  HR: 51 (10-26-20 @ 07:24) (51 - 75)  BP: 113/76 (10-26-20 @ 07:24) (113/76 - 133/68)  RR: 16 (10-26-20 @ 07:24) (16 - 18)  SpO2: 100% (10-26-20 @ 07:24) (99% - 100%)  GENERAL: Lethargic, NAD, well-developed  HEAD:  Atraumatic, Normocephalic  EYES: EOMI, conjunctiva and sclera clear, no photophobia  NECK: Supple, no nuchal rigidity  CHEST/LUNG: Clear to auscultation bilaterally; No wheeze  HEART: Regular rate and rhythm; No murmurs, rubs, or gallops  ABDOMEN: Soft, Nontender, Nondistended; Bowel sounds present  EXTREMITIES:  2+ Peripheral Pulses, No clubbing, cyanosis, or edema  PSYCH: AAOx3  NEUROLOGY: non-focal  SKIN: No rashes or lesions

## 2022-08-31 ENCOUNTER — INPATIENT (INPATIENT)
Facility: HOSPITAL | Age: 32
LOS: 2 days | Discharge: HOME | End: 2022-09-03
Attending: INTERNAL MEDICINE | Admitting: INTERNAL MEDICINE

## 2022-08-31 VITALS
SYSTOLIC BLOOD PRESSURE: 138 MMHG | RESPIRATION RATE: 18 BRPM | DIASTOLIC BLOOD PRESSURE: 82 MMHG | OXYGEN SATURATION: 100 % | HEART RATE: 96 BPM | HEIGHT: 71 IN | TEMPERATURE: 98 F

## 2022-08-31 DIAGNOSIS — Z29.9 ENCOUNTER FOR PROPHYLACTIC MEASURES, UNSPECIFIED: ICD-10-CM

## 2022-08-31 DIAGNOSIS — F51.4 SLEEP TERRORS [NIGHT TERRORS]: ICD-10-CM

## 2022-08-31 DIAGNOSIS — R33.9 RETENTION OF URINE, UNSPECIFIED: ICD-10-CM

## 2022-08-31 DIAGNOSIS — Z98.890 OTHER SPECIFIED POSTPROCEDURAL STATES: Chronic | ICD-10-CM

## 2022-08-31 DIAGNOSIS — R29.898 OTHER SYMPTOMS AND SIGNS INVOLVING THE MUSCULOSKELETAL SYSTEM: ICD-10-CM

## 2022-08-31 DIAGNOSIS — B20 HUMAN IMMUNODEFICIENCY VIRUS [HIV] DISEASE: ICD-10-CM

## 2022-08-31 LAB
4/8 RATIO: 0.25 RATIO — LOW (ref 0.9–3.6)
ABS CD8: 1352 /UL — HIGH (ref 142–740)
APPEARANCE UR: CLEAR — SIGNIFICANT CHANGE UP
B PERT DNA SPEC QL NAA+PROBE: SIGNIFICANT CHANGE UP
B PERT+PARAPERT DNA PNL SPEC NAA+PROBE: SIGNIFICANT CHANGE UP
BACTERIA # UR AUTO: ABNORMAL
BASE EXCESS BLDV CALC-SCNC: 1.7 MMOL/L — SIGNIFICANT CHANGE UP (ref -2–3)
BASOPHILS # BLD AUTO: 0.02 K/UL — SIGNIFICANT CHANGE UP (ref 0–0.2)
BASOPHILS NFR BLD AUTO: 0.3 % — SIGNIFICANT CHANGE UP (ref 0–2)
BILIRUB UR-MCNC: NEGATIVE — SIGNIFICANT CHANGE UP
BLOOD GAS VENOUS COMPREHENSIVE RESULT: SIGNIFICANT CHANGE UP
BORDETELLA PARAPERTUSSIS (RAPRVP): SIGNIFICANT CHANGE UP
C PNEUM DNA SPEC QL NAA+PROBE: SIGNIFICANT CHANGE UP
CD3 BLASTS SPEC-ACNC: 1758 /UL — SIGNIFICANT CHANGE UP (ref 672–1870)
CD3 BLASTS SPEC-ACNC: 87 % — HIGH (ref 59–83)
CD4 %: 17 % — LOW (ref 30–62)
CD8 %: 67 % — HIGH (ref 12–36)
CHLORIDE BLDV-SCNC: 104 MMOL/L — SIGNIFICANT CHANGE UP (ref 96–108)
CK SERPL-CCNC: 178 U/L — SIGNIFICANT CHANGE UP (ref 30–200)
CO2 BLDV-SCNC: 27 MMOL/L — HIGH (ref 22–26)
COLOR SPEC: YELLOW — SIGNIFICANT CHANGE UP
COMMENT - URINE: SIGNIFICANT CHANGE UP
CRP SERPL-MCNC: 11.7 MG/L — HIGH
CRP SERPL-MCNC: 9.8 MG/L — HIGH
DIFF PNL FLD: ABNORMAL
EOSINOPHIL # BLD AUTO: 0.06 K/UL — SIGNIFICANT CHANGE UP (ref 0–0.5)
EOSINOPHIL NFR BLD AUTO: 1 % — SIGNIFICANT CHANGE UP (ref 0–6)
EPI CELLS # UR: 4 /HPF — SIGNIFICANT CHANGE UP (ref 0–5)
ERYTHROCYTE [SEDIMENTATION RATE] IN BLOOD: 40 MM/HR — HIGH (ref 1–15)
ERYTHROCYTE [SEDIMENTATION RATE] IN BLOOD: 42 MM/HR — HIGH (ref 1–15)
FLUAV SUBTYP SPEC NAA+PROBE: SIGNIFICANT CHANGE UP
FLUBV RNA SPEC QL NAA+PROBE: SIGNIFICANT CHANGE UP
FOLATE SERPL-MCNC: 15.8 NG/ML — SIGNIFICANT CHANGE UP (ref 3.1–17.5)
GAS PNL BLDV: 137 MMOL/L — SIGNIFICANT CHANGE UP (ref 136–145)
GLUCOSE BLDV-MCNC: 77 MG/DL — SIGNIFICANT CHANGE UP (ref 70–99)
GLUCOSE UR QL: NEGATIVE — SIGNIFICANT CHANGE UP
HADV DNA SPEC QL NAA+PROBE: SIGNIFICANT CHANGE UP
HCO3 BLDV-SCNC: 26 MMOL/L — SIGNIFICANT CHANGE UP (ref 22–29)
HCOV 229E RNA SPEC QL NAA+PROBE: SIGNIFICANT CHANGE UP
HCOV HKU1 RNA SPEC QL NAA+PROBE: SIGNIFICANT CHANGE UP
HCOV NL63 RNA SPEC QL NAA+PROBE: SIGNIFICANT CHANGE UP
HCOV OC43 RNA SPEC QL NAA+PROBE: SIGNIFICANT CHANGE UP
HCT VFR BLD CALC: 38.9 % — LOW (ref 39–50)
HCT VFR BLDA CALC: 38 % — LOW (ref 39–51)
HGB BLD CALC-MCNC: 12.8 G/DL — LOW (ref 13–17)
HGB BLD-MCNC: 13.1 G/DL — SIGNIFICANT CHANGE UP (ref 13–17)
HIV 1+2 AB+HIV1 P24 AG SERPL QL IA: (no result)
HMPV RNA SPEC QL NAA+PROBE: SIGNIFICANT CHANGE UP
HPIV1 RNA SPEC QL NAA+PROBE: SIGNIFICANT CHANGE UP
HPIV2 RNA SPEC QL NAA+PROBE: SIGNIFICANT CHANGE UP
HPIV3 RNA SPEC QL NAA+PROBE: SIGNIFICANT CHANGE UP
HPIV4 RNA SPEC QL NAA+PROBE: SIGNIFICANT CHANGE UP
HYALINE CASTS # UR AUTO: 2 /LPF — SIGNIFICANT CHANGE UP (ref 0–7)
IANC: 1.89 K/UL — SIGNIFICANT CHANGE UP (ref 1.8–7.4)
IMM GRANULOCYTES NFR BLD AUTO: 0.2 % — SIGNIFICANT CHANGE UP (ref 0–1.5)
KETONES UR-MCNC: NEGATIVE — SIGNIFICANT CHANGE UP
LACTATE BLDV-MCNC: 1.2 MMOL/L — SIGNIFICANT CHANGE UP (ref 0.5–2)
LEUKOCYTE ESTERASE UR-ACNC: NEGATIVE — SIGNIFICANT CHANGE UP
LYMPHOCYTES # BLD AUTO: 3.33 K/UL — HIGH (ref 1–3.3)
LYMPHOCYTES # BLD AUTO: 55 % — HIGH (ref 13–44)
M PNEUMO DNA SPEC QL NAA+PROBE: SIGNIFICANT CHANGE UP
MCHC RBC-ENTMCNC: 30 PG — SIGNIFICANT CHANGE UP (ref 27–34)
MCHC RBC-ENTMCNC: 33.7 GM/DL — SIGNIFICANT CHANGE UP (ref 32–36)
MCV RBC AUTO: 89 FL — SIGNIFICANT CHANGE UP (ref 80–100)
MONOCYTES # BLD AUTO: 0.74 K/UL — SIGNIFICANT CHANGE UP (ref 0–0.9)
MONOCYTES NFR BLD AUTO: 12.2 % — SIGNIFICANT CHANGE UP (ref 2–14)
MYOGLOBIN SERPL-MCNC: 22 NG/ML — LOW (ref 28–72)
NEUTROPHILS # BLD AUTO: 1.89 K/UL — SIGNIFICANT CHANGE UP (ref 1.8–7.4)
NEUTROPHILS NFR BLD AUTO: 31.3 % — LOW (ref 43–77)
NITRITE UR-MCNC: NEGATIVE — SIGNIFICANT CHANGE UP
NRBC # BLD: 0 /100 WBCS — SIGNIFICANT CHANGE UP (ref 0–0)
NRBC # FLD: 0 K/UL — SIGNIFICANT CHANGE UP (ref 0–0)
PCO2 BLDV: 38 MMHG — LOW (ref 42–55)
PH BLDV: 7.44 — HIGH (ref 7.32–7.43)
PH UR: 5.5 — SIGNIFICANT CHANGE UP (ref 5–8)
PLATELET # BLD AUTO: 273 K/UL — SIGNIFICANT CHANGE UP (ref 150–400)
PO2 BLDV: 27 MMHG — SIGNIFICANT CHANGE UP
POTASSIUM BLDV-SCNC: 3.3 MMOL/L — LOW (ref 3.5–5.1)
PROT UR-MCNC: ABNORMAL
RAPID RVP RESULT: SIGNIFICANT CHANGE UP
RBC # BLD: 4.37 M/UL — SIGNIFICANT CHANGE UP (ref 4.2–5.8)
RBC # FLD: 13.4 % — SIGNIFICANT CHANGE UP (ref 10.3–14.5)
RBC CASTS # UR COMP ASSIST: 237 /HPF — HIGH (ref 0–4)
RSV RNA SPEC QL NAA+PROBE: SIGNIFICANT CHANGE UP
RV+EV RNA SPEC QL NAA+PROBE: SIGNIFICANT CHANGE UP
SAO2 % BLDV: 42 % — SIGNIFICANT CHANGE UP
SARS-COV-2 RNA SPEC QL NAA+PROBE: SIGNIFICANT CHANGE UP
SP GR SPEC: 1.03 — SIGNIFICANT CHANGE UP (ref 1.01–1.05)
T-CELL CD4 SUBSET PNL BLD: 335 /UL — LOW (ref 489–1457)
UROBILINOGEN FLD QL: ABNORMAL
VIT B12 SERPL-MCNC: 298 PG/ML — SIGNIFICANT CHANGE UP (ref 200–900)
WBC # BLD: 6.05 K/UL — SIGNIFICANT CHANGE UP (ref 3.8–10.5)
WBC # FLD AUTO: 6.05 K/UL — SIGNIFICANT CHANGE UP (ref 3.8–10.5)
WBC UR QL: 6 /HPF — HIGH (ref 0–5)

## 2022-08-31 PROCEDURE — 99285 EMERGENCY DEPT VISIT HI MDM: CPT

## 2022-08-31 PROCEDURE — 70450 CT HEAD/BRAIN W/O DYE: CPT | Mod: 26

## 2022-08-31 PROCEDURE — 99222 1ST HOSP IP/OBS MODERATE 55: CPT

## 2022-08-31 PROCEDURE — 99223 1ST HOSP IP/OBS HIGH 75: CPT

## 2022-08-31 RX ORDER — OXYCODONE HYDROCHLORIDE 5 MG/1
5 TABLET ORAL ONCE
Refills: 0 | Status: DISCONTINUED | OUTPATIENT
Start: 2022-08-31 | End: 2022-08-31

## 2022-08-31 RX ORDER — QUETIAPINE FUMARATE 200 MG/1
100 TABLET, FILM COATED ORAL
Refills: 0 | Status: DISCONTINUED | OUTPATIENT
Start: 2022-08-31 | End: 2022-09-03

## 2022-08-31 RX ORDER — OXYCODONE HYDROCHLORIDE 5 MG/1
10 TABLET ORAL EVERY 6 HOURS
Refills: 0 | Status: DISCONTINUED | OUTPATIENT
Start: 2022-08-31 | End: 2022-09-03

## 2022-08-31 RX ORDER — ACETAMINOPHEN 500 MG
650 TABLET ORAL EVERY 6 HOURS
Refills: 0 | Status: DISCONTINUED | OUTPATIENT
Start: 2022-08-31 | End: 2022-09-03

## 2022-08-31 RX ORDER — ACETAMINOPHEN 500 MG
650 TABLET ORAL ONCE
Refills: 0 | Status: COMPLETED | OUTPATIENT
Start: 2022-08-31 | End: 2022-08-31

## 2022-08-31 RX ORDER — LANOLIN ALCOHOL/MO/W.PET/CERES
3 CREAM (GRAM) TOPICAL AT BEDTIME
Refills: 0 | Status: DISCONTINUED | OUTPATIENT
Start: 2022-08-31 | End: 2022-09-03

## 2022-08-31 RX ORDER — BICTEGRAVIR SODIUM, EMTRICITABINE, AND TENOFOVIR ALAFENAMIDE FUMARATE 30; 120; 15 MG/1; MG/1; MG/1
1 TABLET ORAL DAILY
Refills: 0 | Status: DISCONTINUED | OUTPATIENT
Start: 2022-08-31 | End: 2022-09-03

## 2022-08-31 RX ORDER — DIPHENHYDRAMINE HCL 50 MG
25 CAPSULE ORAL EVERY 6 HOURS
Refills: 0 | Status: DISCONTINUED | OUTPATIENT
Start: 2022-08-31 | End: 2022-09-03

## 2022-08-31 RX ORDER — BACLOFEN 100 %
10 POWDER (GRAM) MISCELLANEOUS EVERY 8 HOURS
Refills: 0 | Status: DISCONTINUED | OUTPATIENT
Start: 2022-08-31 | End: 2022-09-03

## 2022-08-31 RX ORDER — QUETIAPINE FUMARATE 200 MG/1
200 TABLET, FILM COATED ORAL AT BEDTIME
Refills: 0 | Status: DISCONTINUED | OUTPATIENT
Start: 2022-08-31 | End: 2022-09-03

## 2022-08-31 RX ORDER — OXYCODONE HYDROCHLORIDE 5 MG/1
5 TABLET ORAL EVERY 6 HOURS
Refills: 0 | Status: DISCONTINUED | OUTPATIENT
Start: 2022-08-31 | End: 2022-09-03

## 2022-08-31 RX ORDER — ENOXAPARIN SODIUM 100 MG/ML
40 INJECTION SUBCUTANEOUS EVERY 24 HOURS
Refills: 0 | Status: DISCONTINUED | OUTPATIENT
Start: 2022-08-31 | End: 2022-09-03

## 2022-08-31 RX ORDER — BACLOFEN 100 %
5 POWDER (GRAM) MISCELLANEOUS EVERY 8 HOURS
Refills: 0 | Status: DISCONTINUED | OUTPATIENT
Start: 2022-08-31 | End: 2022-08-31

## 2022-08-31 RX ADMIN — ENOXAPARIN SODIUM 40 MILLIGRAM(S): 100 INJECTION SUBCUTANEOUS at 22:09

## 2022-08-31 RX ADMIN — QUETIAPINE FUMARATE 200 MILLIGRAM(S): 200 TABLET, FILM COATED ORAL at 22:10

## 2022-08-31 RX ADMIN — Medication 650 MILLIGRAM(S): at 11:22

## 2022-08-31 RX ADMIN — OXYCODONE HYDROCHLORIDE 5 MILLIGRAM(S): 5 TABLET ORAL at 18:10

## 2022-08-31 RX ADMIN — OXYCODONE HYDROCHLORIDE 5 MILLIGRAM(S): 5 TABLET ORAL at 12:07

## 2022-08-31 RX ADMIN — OXYCODONE HYDROCHLORIDE 5 MILLIGRAM(S): 5 TABLET ORAL at 17:10

## 2022-08-31 RX ADMIN — Medication 650 MILLIGRAM(S): at 10:16

## 2022-08-31 RX ADMIN — OXYCODONE HYDROCHLORIDE 10 MILLIGRAM(S): 5 TABLET ORAL at 16:05

## 2022-08-31 RX ADMIN — Medication 25 MILLIGRAM(S): at 19:46

## 2022-08-31 RX ADMIN — OXYCODONE HYDROCHLORIDE 10 MILLIGRAM(S): 5 TABLET ORAL at 15:05

## 2022-08-31 RX ADMIN — Medication 50 MILLIGRAM(S): at 22:10

## 2022-08-31 RX ADMIN — OXYCODONE HYDROCHLORIDE 5 MILLIGRAM(S): 5 TABLET ORAL at 13:05

## 2022-08-31 NOTE — H&P ADULT - PROBLEM SELECTOR PLAN 3
Patient with congenital HIV  Following with ID, Dr. Hunter  CD4 count 207 on 8/12  Repeat CD4 count pending  c/w Biktarvy  ID evaluation requested

## 2022-08-31 NOTE — ED ADULT NURSE NOTE - CHIEF COMPLAINT QUOTE
Pt BIBEMS c/o urinary retention X 8 hours, generalized weakness and left foot pain. No chest pain, sob, n/v/d, fevers/chills verbalized.  Pmhx: HIV, Asthma, Guillan Emma syndrome, Cocaine abuse

## 2022-08-31 NOTE — PATIENT PROFILE ADULT - FALL HARM RISK - HARM RISK INTERVENTIONS
Assistance with ambulation/Assistance OOB with selected safe patient handling equipment/Communicate Risk of Fall with Harm to all staff/Discuss with provider need for PT consult/Monitor gait and stability/Reinforce activity limits and safety measures with patient and family/Tailored Fall Risk Interventions/Visual Cue: Yellow wristband and red socks/Bed in lowest position, wheels locked, appropriate side rails in place/Call bell, personal items and telephone in reach/Instruct patient to call for assistance before getting out of bed or chair/Non-slip footwear when patient is out of bed/[place] to call system/Physically safe environment - no spills, clutter or unnecessary equipment/Purposeful Proactive Rounding/Room/bathroom lighting operational, light cord in reach Assistance with ambulation/Assistance OOB with selected safe patient handling equipment/Communicate Risk of Fall with Harm to all staff/Discuss with provider need for PT consult/Monitor gait and stability/Provide patient with walking aids - walker, cane, crutches/Reinforce activity limits and safety measures with patient and family/Review medications for side effects contributing to fall risk/Sit up slowly, dangle for a short time, stand at bedside before walking/Tailored Fall Risk Interventions/Visual Cue: Yellow wristband and red socks/Bed in lowest position, wheels locked, appropriate side rails in place/Call bell, personal items and telephone in reach/Instruct patient to call for assistance before getting out of bed or chair/Non-slip footwear when patient is out of bed/[place] to call system/Physically safe environment - no spills, clutter or unnecessary equipment/Purposeful Proactive Rounding/Room/bathroom lighting operational, light cord in reach

## 2022-08-31 NOTE — H&P ADULT - ASSESSMENT
32 year old with hx of HIV, HIV myelopathy, asthma, afib presenting with acute on chronic lower extremity weakness of 2 day durations associated with urinary retention. Presentation concerning for worsening HIV myelopathy. Patient currently admitted for further workup and management.

## 2022-08-31 NOTE — H&P ADULT - PROBLEM SELECTOR PLAN 5
DVT: Lovenox  Diet: Regular  Dispo: patient currently homeless, living in shelter,  consult placed for assistance

## 2022-08-31 NOTE — PATIENT PROFILE ADULT - STATED REASON FOR ADMISSION
weakness, urinary retention  weakness but worse over past few days, urinary retention. I have had this, they think it's from myopathy.

## 2022-08-31 NOTE — H&P ADULT - HISTORY OF PRESENT ILLNESS
Patient is a 31 y/o M with history of congenital HIV, chronic back pain, HIV myelopathy, asthma now presenting with worsening LE weakness as well as urinary retention. Patient has had multiple admissions for similar complaints and has undergone extensive workup in the past including multiple MRIs as well as EMGs performed out outside hospitals. Patient reports that that he has been experiencing LE weakness and stumbling associated with sharp pains in both of his legs associated with movement for the past 2 days. Patient reports that his leg pain is bilateral, sharp and worse with movement of his feet. When pain comes on, he states it is a 10/10 in intensity but it quickly subsides. He also endorses difficulty with urination which started 1 day ago. Reports that he has been having the urge to urinate but unable to properly void. Otherwise denies any other symptoms such as fever, CP, SOB, dizziness, n/v, or abd pain.     ED Course: patient given oxycodone 5, tylenol 650mg and had gonzales catheter placed with good UOP.

## 2022-08-31 NOTE — H&P ADULT - NSHPREVIEWOFSYSTEMS_GEN_ALL_CORE
CONSTITUTIONAL: no fevers or chills  EYES/ENT: No visual changes;  No vertigo or throat pain   NECK: No pain or stiffness  RESPIRATORY: No cough, wheezing, hemoptysis; No shortness of breath  CARDIOVASCULAR: No chest pain or palpitations  GASTROINTESTINAL: No abdominal or epigastric pain. No nausea, vomiting, or hematemesis; No diarrhea or constipation. No melena or hematochezia.  GENITOURINARY: No dysuria, frequency or hematuria, +urinary retention  NEUROLOGICAL: No numbness, + LE weakness  SKIN: No itching, rashes  PSYCHOLOGICAL: appropriate mood and affect  ENDOCRINE: no heat or cold intolerance

## 2022-08-31 NOTE — CONSULT NOTE ADULT - SUBJECTIVE AND OBJECTIVE BOX
Neurology Consultation     HPI: Patient LS is a 32 year old man with cogenital HIV, GBS, chronic back pain, substaunce use, HIV myelopathy, asthma who is here for fluctuations in worsening lower extremity weakness and urinary retention. Pt states that his LE weakness got worse 2 days ago, he kept tripping on his legs and was not able to walk. He then developed urinary retention for the past 1 day. Of note was seen by neurology here in 8/9/22 for 3 days of LE weakness of toes to hips, 2-3 days bowel/bladder incontinence, lower back pain, muscle spasms in lumbar back. Did not have saddle anesthesia then. Takes baclofen, gabapentin, lyrica. Was also seen by neurology (1/2022 and 9/2021) for similar complaints of b/l LE weakness a/w urinary retention. 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 Patient has had hx of normal LP and normal spine MRI's. Prior hx of GBS noted to be incorrect (refer to consult notes from 1/2022` and 9/2021). Since, patient has had multiple MRI lumbar spines (6/5/21, 5/21/21, 5/2/21, 10/26/20). MRI cervical spine (5/2/21), MRI thoracic spine (5/2/21, 10/26/20), MRI head (3/25/20).     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    Asthma    Closed fracture of multiple ribs of right side, initial encounter    Cocaine abuse    Chronic sinusitis    Homeless    History of orthopedic surgery  left arm    FAMILY HISTORY:  FH: HIV infection (Mother)      SOCIAL HISTORY: no smoking, alcohol, drug use hx    MEDICATIONS (HOME):  Home Medications:  melatonin 5 mg oral tablet: 1 tab(s) orally once a day (at bedtime) (16 Aug 2022 11:08)    MEDICATIONS  (STANDING):    MEDICATIONS  (PRN):    ALLERGIES/INTOLERANCES:  Allergies  Ceclor (Unknown)  Toradol (Anaphylaxis; Swelling)  Toradol (Swelling)    Intolerances    VITALS & EXAMINATION:  Vital Signs Last 24 Hrs  T(C): 36.8 (31 Aug 2022 03:05), Max: 36.8 (31 Aug 2022 03:05)  T(F): 98.3 (31 Aug 2022 03:05), Max: 98.3 (31 Aug 2022 03:05)  HR: 96 (31 Aug 2022 03:05) (96 - 96)  BP: 138/82 (31 Aug 2022 03:05) (138/82 - 138/82)  BP(mean): --  RR: 18 (31 Aug 2022 03:05) (18 - 18)  SpO2: 100% (31 Aug 2022 03:05) (100% - 100%)    Parameters below as of 31 Aug 2022 03:05  Patient On (Oxygen Delivery Method): room air    LABORATORY:  CBC   Chem       LFTs   Coagulopathy   Lipid Panel   A1c   Cardiac enzymes     U/A   CSF  Other    STUDIES & IMAGING: (EEG, CT, MR, U/S, TTE/GARFIELD):    < from: MR Lumbar Spine w/ IV Cont (06.05.21 @ 05:01) >    EXAM:  MR SPINE LUMBAR IC        PROCEDURE DATE:  Jun 5 2021         INTERPRETATION:  CLINICAL INFORMATION: Bilateral leg weakness, urinary hesitancy, midline lumbar tenderness, evaluate for epidural abscess..    TECHNIQUE: Multiplanar and multisequence contrast enhanced MR of the lumbar spine was performed. 7.5 cc of Gadavist were administered.    COMPARISON: MRI lumbar spine 10/26/2020, CT abdomen pelvis 12/12/2020.    FINDINGS:    Axial and postcontrast sequences are motion degraded.    There is a normal lumbar lordosis. Vertebral body heights, alignment, and marrow signal are maintained without compression fracture or subluxation. No bone marrow edema is present.  The intervertebral disc space heights are maintained.  Mild facet arthropathy in the lower lumbar spine.    The conus medullaris is normal in contour and position at L1.    o abnormal enhancement is present in the lumbar canal.    No evidence of large epidural collection. Paraspinal soft tissues appear within normal limits.    IMPRESSION: Motion degraded study.    No evidence of large epidural abscess or central canal stenosis.      < from: MR Cervical Spine w/wo IV Cont (05.02.21 @ 03:14) >    EXAM:  MR SPINE THORACIC WAW IC                          EXAM:  MR SPINE CERVICAL WAW IC                          EXAM:  MR SPINE LUMBAR WAW IC                            PROCEDURE DATE:  05/02/2021      INTERPRETATION:  INDICATIONS:  Guillain-[place] syndrome with lower extremity weakness  and left upper extremity numbness    TECHNIQUE:  Sagittal T1 weighted, STIR and T2 weighted of the spine as well as axial T1 weighted and T2 weighted images were obtained.  Following intravenous gadoliniumsagittal and axial T1-weighted images were performed. Fat-suppressed images were obtained. 8 cc Gadavist were administered. 2 cc were discarded.    COMPARISON EXAMINATION: Spine MR 10/26/2020    FINDINGS:  The exam is degraded by motion artifact    CERVICAL SPINE:  VERTEBRAL BODIES:  Normal.  ALIGNMENT:  No subluxations.  INTERVERTEBRAL DISC SPACES:  Mild C6-C7 disc bulge  NEURAL FORAMINA:  Normal.  SPINAL CORD: Normal.  SPINAL CANAL:  No other intradural or extradural defects are seen. No abnormal enhancement is seen.  MISCELLANEOUS:  Prominent nasopharyngeal soft tissue is seen without significant change from the prior exam likely representing residual adenoids    THORACIC SPINE:  VERTEBRAL BODIES:  Normal.  ALIGNMENT:  No subluxations.  INTERVERTEBRAL DISC SPACES:  Normal.  NEURAL FORAMINA:  Normal.  SPINAL CORD: Normal.  SPINAL CANAL:  No other intradural or extradural defects are seen. No abnormal enhancement is seen.  MISCELLANEOUS:  None.    LUMBAR SPINE:  VERTEBRAL BODIES:  Normal.  ALIGNMENT:  No subluxations.  INTERVERTEBRAL DISC SPACES:  L5-S1 left lateral disc protrusion abutting the S1 nerve root, unchanged.  NEURAL FORAMINA:  Normal.  CONUS MEDULLARIS: Normal.  SPINAL CANAL:  No other intradural or extradural defects areseen. No abnormal enhancement is seen.  MISCELLANEOUS:  None.    IMPRESSION:  Motion degraded exam.    Mild spondylitic changes.    No spinal cord or intraspinal abnormality identified.    Prominent nasopharyngeal soft tissue likely representing adenoidal hypertrophy. Correlation with direct visualization is advised.    < from: MR Thoracic Spine w/wo IV Cont (05.02.21 @ 03:11) >    EXAM:  MR SPINE THORACIC WAW IC                          EXAM:  MR SPINE CERVICAL WAW IC                          EXAM:  MR SPINE LUMBAR WAW IC                            PROCEDURE DATE:  05/02/2021      INTERPRETATION:  INDICATIONS:  Guillain-[place] syndrome with lower extremity weakness  and left upper extremity numbness    TECHNIQUE:  Sagittal T1 weighted, STIR and T2 weighted of the spine as well as axial T1 weighted and T2 weighted images were obtained.  Following intravenous gadoliniumsagittal and axial T1-weighted images were performed. Fat-suppressed images were obtained. 8 cc Gadavist were administered. 2 cc were discarded.    COMPARISON EXAMINATION: Spine MR 10/26/2020    FINDINGS:  The exam is degraded by motion artifact    CERVICAL SPINE:  VERTEBRAL BODIES:  Normal.  ALIGNMENT:  No subluxations.  INTERVERTEBRAL DISC SPACES:  Mild C6-C7 disc bulge  NEURAL FORAMINA:  Normal.  SPINAL CORD: Normal.  SPINAL CANAL:  No other intradural or extradural defects are seen. No abnormal enhancement is seen.  MISCELLANEOUS:  Prominent nasopharyngeal soft tissue is seen without significant change from the prior exam likely representing residual adenoids    THORACIC SPINE:  VERTEBRAL BODIES:  Normal.  ALIGNMENT:  No subluxations.  INTERVERTEBRAL DISC SPACES:  Normal.  NEURAL FORAMINA:  Normal.  SPINAL CORD: Normal.  SPINAL CANAL:  No other intradural or extradural defects are seen. No abnormal enhancement is seen.  MISCELLANEOUS:  None.    LUMBAR SPINE:  VERTEBRAL BODIES:  Normal.  ALIGNMENT:  No subluxations.  INTERVERTEBRAL DISC SPACES:  L5-S1 left lateral disc protrusion abutting the S1 nerve root, unchanged.  NEURAL FORAMINA:  Normal.  CONUS MEDULLARIS: Normal.  SPINAL CANAL:  No other intradural or extradural defects areseen. No abnormal enhancement is seen.  MISCELLANEOUS:  None.    IMPRESSION:  Motion degraded exam.    Mild spondylitic changes.    No spinal cord or intraspinal abnormality identified.    Prominent nasopharyngeal soft tissue likely representing adenoidal hypertrophy. Correlation with direct visualization is advised.    KACI MENJIVAR MD; Attending Radiologist  This document has been electronically signed. May  2 2021  8:17AM    < end of copied text >    < from: MR Head No Cont (03.25.20 @ 18:57) >     EXAM:  MR BRAIN                          PROCEDURE DATE:  03/25/2020      INTERPRETATION:  INDICATION:    Stroke. Status post TPA.  TECHNIQUE:  Multiplanar brain imaging was conducted using a 1.5 Addie magnet.  T1, T2 and FLAIR imaging was performed.  In addition, diffusion imaging, diffusion coefficient assessment (ADC) and echo planar T2* was incorporated. No contrast administered    COMPARISON EXAMINATION:  CT dated 3/25/2020    FINDINGS:  HEMISPHERES:  No diffusion restriction or acuteischemia is noted. There are scattered areas of T2 hyperintensity in the peritrigonal white matter which is nonspecific. A differential includes inflammatory etiologies, demyelinating disease, and/or Lyme's disease. No microhemorrhage or mass is noted.  VENTRICLES:  midline and normal in size  POSTERIOR FOSSA:  The brain stem and cerebellum are unremarkable in appearance.  No CP angle abnormality is noted.  EXTRA-AXIAL:  No mass or collections are depicted.  CRANIAL NERVES:  No abnormality noted.  VASCULATURE:  The major vessels and venous sinuses are grossly patent.    SINUSES AND MASTOIDS:  The coastal thickening noted in the sinuses  MISCELLANEOUS:  No orbital or pituitary abnormality noted.  No skull base lesion suggested.    IMPRESSION:    1)  no diffusion restriction or MR evidence of acute ischemia. Periventricular white matter findings raise the possibility of an underlying inflammatory etiology and/or demyelinating disease. Further workup and assessment recommended..  2)  mucosal thickening noted in the sinuses. Mastoids are clear..     LINDSEY FIGUEROA M.D., ATTENDING RADIOLOGIST  This document has been electronically signed. Mar 25 2020  6:51PM      < end of copied text >   Neurology Consultation     HPI: Patient LS is a 32 year old man with congenital HIV, chronic back pain, substance use, ?HIV myelopathy, asthma who is here for fluctuations in worsening lower extremity weakness and urinary retention. Pt states that his LE weakness got worse 2 days ago, he kept tripping on his legs and was not able to walk. He then developed urinary retention for the past 1 day. Of note was seen by neurology here in 8/9/22 for 3 days of LE weakness of toes to hips, 2-3 days bowel/bladder incontinence, lower back pain, muscle spasms in lumbar back. Did not have saddle anesthesia then. Takes baclofen, gabapentin, lyrica. Was also seen by neurology (1/2022 and 9/2021) for similar complaints of b/l LE weakness a/w urinary retention. 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 Patient has had hx of normal LP and normal spine MRI's. Prior hx of GBS noted to be incorrect (refer to consult notes from 1/2022` and 9/2021). Since, patient has had multiple MRI lumbar spines (6/5/21, 5/21/21, 5/2/21, 10/26/20). MRI cervical spine (5/2/21), MRI thoracic spine (5/2/21, 10/26/20), MRI head (3/25/20). HIE notes from other systems report EMG/NCS in 2021 as being normal.     Of note, has also had extensive workup done in [facility] system and at other hospitals. Was also seeing neurology in September 2021 Dr Nae Love. Went to [facility] ED in 2/2022 where he left AMA after entering MRI for repeat MRI.,In 4/2022 admission to OSH, he came for worsening weakness, LE pain and had concerns of seeking shelter in hospital, but was not asking for pain medications. Pt was then found pos for cocaine and marijuana.    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    Asthma    Closed fracture of multiple ribs of right side, initial encounter    Cocaine abuse    Chronic sinusitis    Homeless    History of orthopedic surgery  left arm    FAMILY HISTORY:  FH: HIV infection (Mother)      SOCIAL HISTORY: no smoking, alcohol, drug use hx    MEDICATIONS (HOME):  Home Medications:  melatonin 5 mg oral tablet: 1 tab(s) orally once a day (at bedtime) (16 Aug 2022 11:08)    MEDICATIONS  (STANDING):    MEDICATIONS  (PRN):    ALLERGIES/INTOLERANCES:  Allergies  Ceclor (Unknown)  Toradol (Anaphylaxis; Swelling)  Toradol (Swelling)    Intolerances    VITALS & EXAMINATION:  Vital Signs Last 24 Hrs  T(C): 36.8 (31 Aug 2022 03:05), Max: 36.8 (31 Aug 2022 03:05)  T(F): 98.3 (31 Aug 2022 03:05), Max: 98.3 (31 Aug 2022 03:05)  HR: 96 (31 Aug 2022 03:05) (96 - 96)  BP: 138/82 (31 Aug 2022 03:05) (138/82 - 138/82)  BP(mean): --  RR: 18 (31 Aug 2022 03:05) (18 - 18)  SpO2: 100% (31 Aug 2022 03:05) (100% - 100%)    Parameters below as of 31 Aug 2022 03:05  Patient On (Oxygen Delivery Method): room air    LABORATORY:  CBC   Chem       LFTs   Coagulopathy   Lipid Panel   A1c   Cardiac enzymes     U/A   CSF  Other    STUDIES & IMAGING: (EEG, CT, MR, U/S, TTE/GARFIELD):    < from: MR Lumbar Spine w/ IV Cont (06.05.21 @ 05:01) >    EXAM:  MR SPINE LUMBAR IC        PROCEDURE DATE:  Jun 5 2021         INTERPRETATION:  CLINICAL INFORMATION: Bilateral leg weakness, urinary hesitancy, midline lumbar tenderness, evaluate for epidural abscess..    TECHNIQUE: Multiplanar and multisequence contrast enhanced MR of the lumbar spine was performed. 7.5 cc of Gadavist were administered.    COMPARISON: MRI lumbar spine 10/26/2020, CT abdomen pelvis 12/12/2020.    FINDINGS:    Axial and postcontrast sequences are motion degraded.    There is a normal lumbar lordosis. Vertebral body heights, alignment, and marrow signal are maintained without compression fracture or subluxation. No bone marrow edema is present.  The intervertebral disc space heights are maintained.  Mild facet arthropathy in the lower lumbar spine.    The conus medullaris is normal in contour and position at L1.    o abnormal enhancement is present in the lumbar canal.    No evidence of large epidural collection. Paraspinal soft tissues appear within normal limits.    IMPRESSION: Motion degraded study.    No evidence of large epidural abscess or central canal stenosis.      < from: MR Cervical Spine w/wo IV Cont (05.02.21 @ 03:14) >    EXAM:  MR SPINE THORACIC WAW IC                          EXAM:  MR SPINE CERVICAL WAW IC                          EXAM:  MR SPINE LUMBAR WAW IC                            PROCEDURE DATE:  05/02/2021      INTERPRETATION:  INDICATIONS:  Guillain-[place] syndrome with lower extremity weakness  and left upper extremity numbness    TECHNIQUE:  Sagittal T1 weighted, STIR and T2 weighted of the spine as well as axial T1 weighted and T2 weighted images were obtained.  Following intravenous gadoliniumsagittal and axial T1-weighted images were performed. Fat-suppressed images were obtained. 8 cc Gadavist were administered. 2 cc were discarded.    COMPARISON EXAMINATION: Spine MR 10/26/2020    FINDINGS:  The exam is degraded by motion artifact    CERVICAL SPINE:  VERTEBRAL BODIES:  Normal.  ALIGNMENT:  No subluxations.  INTERVERTEBRAL DISC SPACES:  Mild C6-C7 disc bulge  NEURAL FORAMINA:  Normal.  SPINAL CORD: Normal.  SPINAL CANAL:  No other intradural or extradural defects are seen. No abnormal enhancement is seen.  MISCELLANEOUS:  Prominent nasopharyngeal soft tissue is seen without significant change from the prior exam likely representing residual adenoids    THORACIC SPINE:  VERTEBRAL BODIES:  Normal.  ALIGNMENT:  No subluxations.  INTERVERTEBRAL DISC SPACES:  Normal.  NEURAL FORAMINA:  Normal.  SPINAL CORD: Normal.  SPINAL CANAL:  No other intradural or extradural defects are seen. No abnormal enhancement is seen.  MISCELLANEOUS:  None.    LUMBAR SPINE:  VERTEBRAL BODIES:  Normal.  ALIGNMENT:  No subluxations.  INTERVERTEBRAL DISC SPACES:  L5-S1 left lateral disc protrusion abutting the S1 nerve root, unchanged.  NEURAL FORAMINA:  Normal.  CONUS MEDULLARIS: Normal.  SPINAL CANAL:  No other intradural or extradural defects areseen. No abnormal enhancement is seen.  MISCELLANEOUS:  None.    IMPRESSION:  Motion degraded exam.    Mild spondylitic changes.    No spinal cord or intraspinal abnormality identified.    Prominent nasopharyngeal soft tissue likely representing adenoidal hypertrophy. Correlation with direct visualization is advised.    < from: MR Thoracic Spine w/wo IV Cont (05.02.21 @ 03:11) >    EXAM:  MR SPINE THORACIC WAW IC                          EXAM:  MR SPINE CERVICAL WAW IC                          EXAM:  MR SPINE LUMBAR WAW IC                            PROCEDURE DATE:  05/02/2021      INTERPRETATION:  INDICATIONS:  Guillain-[place] syndrome with lower extremity weakness  and left upper extremity numbness    TECHNIQUE:  Sagittal T1 weighted, STIR and T2 weighted of the spine as well as axial T1 weighted and T2 weighted images were obtained.  Following intravenous gadoliniumsagittal and axial T1-weighted images were performed. Fat-suppressed images were obtained. 8 cc Gadavist were administered. 2 cc were discarded.    COMPARISON EXAMINATION: Spine MR 10/26/2020    FINDINGS:  The exam is degraded by motion artifact    CERVICAL SPINE:  VERTEBRAL BODIES:  Normal.  ALIGNMENT:  No subluxations.  INTERVERTEBRAL DISC SPACES:  Mild C6-C7 disc bulge  NEURAL FORAMINA:  Normal.  SPINAL CORD: Normal.  SPINAL CANAL:  No other intradural or extradural defects are seen. No abnormal enhancement is seen.  MISCELLANEOUS:  Prominent nasopharyngeal soft tissue is seen without significant change from the prior exam likely representing residual adenoids    THORACIC SPINE:  VERTEBRAL BODIES:  Normal.  ALIGNMENT:  No subluxations.  INTERVERTEBRAL DISC SPACES:  Normal.  NEURAL FORAMINA:  Normal.  SPINAL CORD: Normal.  SPINAL CANAL:  No other intradural or extradural defects are seen. No abnormal enhancement is seen.  MISCELLANEOUS:  None.    LUMBAR SPINE:  VERTEBRAL BODIES:  Normal.  ALIGNMENT:  No subluxations.  INTERVERTEBRAL DISC SPACES:  L5-S1 left lateral disc protrusion abutting the S1 nerve root, unchanged.  NEURAL FORAMINA:  Normal.  CONUS MEDULLARIS: Normal.  SPINAL CANAL:  No other intradural or extradural defects areseen. No abnormal enhancement is seen.  MISCELLANEOUS:  None.    IMPRESSION:  Motion degraded exam.    Mild spondylitic changes.    No spinal cord or intraspinal abnormality identified.    Prominent nasopharyngeal soft tissue likely representing adenoidal hypertrophy. Correlation with direct visualization is advised.    KACI MENJIVAR MD; Attending Radiologist  This document has been electronically signed. May  2 2021  8:17AM    < end of copied text >    < from: MR Head No Cont (03.25.20 @ 18:57) >     EXAM:  MR BRAIN                          PROCEDURE DATE:  03/25/2020      INTERPRETATION:  INDICATION:    Stroke. Status post TPA.  TECHNIQUE:  Multiplanar brain imaging was conducted using a 1.5 Addie magnet.  T1, T2 and FLAIR imaging was performed.  In addition, diffusion imaging, diffusion coefficient assessment (ADC) and echo planar T2* was incorporated. No contrast administered    COMPARISON EXAMINATION:  CT dated 3/25/2020    FINDINGS:  HEMISPHERES:  No diffusion restriction or acuteischemia is noted. There are scattered areas of T2 hyperintensity in the peritrigonal white matter which is nonspecific. A differential includes inflammatory etiologies, demyelinating disease, and/or Lyme's disease. No microhemorrhage or mass is noted.  VENTRICLES:  midline and normal in size  POSTERIOR FOSSA:  The brain stem and cerebellum are unremarkable in appearance.  No CP angle abnormality is noted.  EXTRA-AXIAL:  No mass or collections are depicted.  CRANIAL NERVES:  No abnormality noted.  VASCULATURE:  The major vessels and venous sinuses are grossly patent.    SINUSES AND MASTOIDS:  The coastal thickening noted in the sinuses  MISCELLANEOUS:  No orbital or pituitary abnormality noted.  No skull base lesion suggested.    IMPRESSION:    1)  no diffusion restriction or MR evidence of acute ischemia. Periventricular white matter findings raise the possibility of an underlying inflammatory etiology and/or demyelinating disease. Further workup and assessment recommended..  2)  mucosal thickening noted in the sinuses. Mastoids are clear..     LINDSEY FIGUEROA M.D., ATTENDING RADIOLOGIST  This document has been electronically signed. Mar 25 2020  6:51PM      < end of copied text >   Neurology Consultation     HPI: Patient LS is a 32 year old man with congenital HIV, chronic back pain, substance use, HIV myelopathy, asthma who is here for fluctuations in worsening lower extremity weakness and urinary retention. Pt states that his LE weakness got worse 2 days ago, he kept tripping on his legs and was not able to walk. He then developed urinary retention for the past day. States weakness is more proximal b/l LE and urinary retention is described as unable to push urine out. Urinary retention is more frequent this last month. Does not state they are completely pain related but there is component. Denies flank pain, fever, chills, headache, vision changes, numbness/tingling/weakness of upper extremities. Of note was seen by neurology here in 8/9/22 for 3 days of LE weakness of toes to hips, 2-3 days bowel/bladder incontinence, lower back pain, muscle spasms in lumbar back. Did not have saddle anesthesia then or now. Takes baclofen, gabapentin, lyrica. Was also seen by neurology (1/2022 and 9/2021) for similar complaints of b/l LE weakness a/w urinary retention. From prior neurology notes, b/l LE weakness at the time was likely 2/2 to myelopathy 2/2 HIV infection and NOT GBS. Patient has had hx of normal LP and normal spine MRI's. Prior hx of GBS noted to be incorrect (refer to consult notes from 1/2022` and 9/2021). Since, patient has had multiple MRI lumbar spines (6/5/21, 5/21/21, 5/2/21, 10/26/20). MRI cervical spine (5/2/21), MRI thoracic spine (5/2/21, 10/26/20), MRI head (3/25/20). HIE notes from other systems report EMG/NCS in 2021 as being normal.     Of note, has also had extensive workup done in [facility] system and at other hospitals. Was also seeing neurology in September 2021 Dr Nae Love. Went to [facility] ED in 2/2022 where he left AMA after entering MRI for repeat MRI.,In 4/2022 admission to OSH, he came for worsening weakness, LE pain and was not asking for pain medications. Pt was then found pos for cocaine and marijuana.    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    Asthma    Closed fracture of multiple ribs of right side, initial encounter    Cocaine abuse    Chronic sinusitis    Homeless    History of orthopedic surgery  left arm    FAMILY HISTORY:  FH: HIV infection (Mother)    SOCIAL HISTORY: cocaine marijuana hx    MEDICATIONS (HOME):  Home Medications:  melatonin 5 mg oral tablet: 1 tab(s) orally once a day (at bedtime) (16 Aug 2022 11:08)    MEDICATIONS  (STANDING):    MEDICATIONS  (PRN):    ALLERGIES/INTOLERANCES:  Allergies  Ceclor (Unknown)  Toradol (Anaphylaxis; Swelling)  Toradol (Swelling)    Intolerances    VITALS & EXAMINATION:  Vital Signs Last 24 Hrs  T(C): 36.8 (31 Aug 2022 03:05), Max: 36.8 (31 Aug 2022 03:05)  T(F): 98.3 (31 Aug 2022 03:05), Max: 98.3 (31 Aug 2022 03:05)  HR: 96 (31 Aug 2022 03:05) (96 - 96)  BP: 138/82 (31 Aug 2022 03:05) (138/82 - 138/82)  BP(mean): --  RR: 18 (31 Aug 2022 03:05) (18 - 18)  SpO2: 100% (31 Aug 2022 03:05) (100% - 100%)    Parameters below as of 31 Aug 2022 03:05  Patient On (Oxygen Delivery Method): room air      General:  Constitutional: Male, appears stated age, nontoxic appearing  Head: normocephalic  Eyes: clear sclera  Respiratory: breathing comfortably  Extremities: chronic skin changes.     Neurological (>12):  MS: Awake, alert, oriented to person, place, situation, time. Normal affect. Follows all commands.  Language: Speech is clear, fluent intact repetition, comprehension.    CNs: PERRL. VFF. EOMI no nystagmus. V1-3 intact to LT. No facial asymmetry b/l, full eye closure strength b/l. Gag reflex deferred. Head turning & shoulder shrug intact b/l. Tongue midline, normal movements, no atrophy.    Motor: Normal muscle bulk & tone. No noticeable tremor or seizure. No pronator drift. 5/5 strength in UE's.     	H-Flex	H-ABd	H-ADd	K-Flex	K-Ext	D-Flex	P-Flex  R	3	3	3	4-	4-	4	4	 	   L	3	3	3	4-	4-	4	4		     Sensation: Intact to LT b/l throughout.     Cortical: Extinction on DSS (neglect): none    Reflexes:              Biceps(C5)       BR(C6)     Triceps(C7)               Patellar(L4)    Achilles(S1)    Plantar Resp  R	3	          2	             2		        3		    3		hypersensitive  L	3	          2	             2		        3		    3		hypersensitive    R foot 4 beats ankle clonus  Negative hills sign b/l    Coordination: No dysmetria to FTN  Gait: GENE    LABORATORY  CBC   Chem       LFTs   Coagulopathy   Lipid Panel   A1c   Cardiac enzymes     U/A   CSF  Other    STUDIES & IMAGING: (EEG, CT, MR, U/S, TTE/GARFIELD):    < from: MR Lumbar Spine w/ IV Cont (06.05.21 @ 05:01) >    EXAM:  MR SPINE LUMBAR IC        PROCEDURE DATE:  Jun 5 2021         INTERPRETATION:  CLINICAL INFORMATION: Bilateral leg weakness, urinary hesitancy, midline lumbar tenderness, evaluate for epidural abscess..    TECHNIQUE: Multiplanar and multisequence contrast enhanced MR of the lumbar spine was performed. 7.5 cc of Gadavist were administered.    COMPARISON: MRI lumbar spine 10/26/2020, CT abdomen pelvis 12/12/2020.    FINDINGS:    Axial and postcontrast sequences are motion degraded.    There is a normal lumbar lordosis. Vertebral body heights, alignment, and marrow signal are maintained without compression fracture or subluxation. No bone marrow edema is present.  The intervertebral disc space heights are maintained.  Mild facet arthropathy in the lower lumbar spine.    The conus medullaris is normal in contour and position at L1.    o abnormal enhancement is present in the lumbar canal.    No evidence of large epidural collection. Paraspinal soft tissues appear within normal limits.    IMPRESSION: Motion degraded study.    No evidence of large epidural abscess or central canal stenosis.      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  Following intravenous gadoliniumsagittal and axial T1-weighted images were performed. Fat-suppressed images were obtained. 8 cc Gadavist were administered. 2 cc were discarded.    COMPARISON EXAMINATION: Spine MR 10/26/2020    FINDINGS:  The exam is degraded by motion artifact    CERVICAL SPINE:  VERTEBRAL BODIES:  Normal.  ALIGNMENT:  No subluxations.  INTERVERTEBRAL DISC SPACES:  Mild C6-C7 disc bulge  NEURAL FORAMINA:  Normal.  SPINAL CORD: Normal.  SPINAL CANAL:  No other intradural or extradural defects are seen. No abnormal enhancement is seen.  MISCELLANEOUS:  Prominent nasopharyngeal soft tissue is seen without significant change from the prior exam likely representing residual adenoids    THORACIC SPINE:  VERTEBRAL BODIES:  Normal.  ALIGNMENT:  No subluxations.  INTERVERTEBRAL DISC SPACES:  Normal.  NEURAL FORAMINA:  Normal.  SPINAL CORD: Normal.  SPINAL CANAL:  No other intradural or extradural defects are seen. No abnormal enhancement is seen.  MISCELLANEOUS:  None.    LUMBAR SPINE:  VERTEBRAL BODIES:  Normal.  ALIGNMENT:  No subluxations.  INTERVERTEBRAL DISC SPACES:  L5-S1 left lateral disc protrusion abutting the S1 nerve root, unchanged.  NEURAL FORAMINA:  Normal.  CONUS MEDULLARIS: Normal.  SPINAL CANAL:  No other intradural or extradural defects areseen. No abnormal enhancement is seen.  MISCELLANEOUS:  None.    IMPRESSION:  Motion degraded exam.    Mild spondylitic changes.    No spinal cord or intraspinal abnormality identified.    Prominent nasopharyngeal soft tissue likely representing adenoidal hypertrophy. Correlation with direct visualization is advised.    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  Following intravenous gadoliniumsagittal and axial T1-weighted images were performed. Fat-suppressed images were obtained. 8 cc Gadavist were administered. 2 cc were discarded.    COMPARISON EXAMINATION: Spine MR 10/26/2020    FINDINGS:  The exam is degraded by motion artifact    CERVICAL SPINE:  VERTEBRAL BODIES:  Normal.  ALIGNMENT:  No subluxations.  INTERVERTEBRAL DISC SPACES:  Mild C6-C7 disc bulge  NEURAL FORAMINA:  Normal.  SPINAL CORD: Normal.  SPINAL CANAL:  No other intradural or extradural defects are seen. No abnormal enhancement is seen.  MISCELLANEOUS:  Prominent nasopharyngeal soft tissue is seen without significant change from the prior exam likely representing residual adenoids    THORACIC SPINE:  VERTEBRAL BODIES:  Normal.  ALIGNMENT:  No subluxations.  INTERVERTEBRAL DISC SPACES:  Normal.  NEURAL FORAMINA:  Normal.  SPINAL CORD: Normal.  SPINAL CANAL:  No other intradural or extradural defects are seen. No abnormal enhancement is seen.  MISCELLANEOUS:  None.    LUMBAR SPINE:  VERTEBRAL BODIES:  Normal.  ALIGNMENT:  No subluxations.  INTERVERTEBRAL DISC SPACES:  L5-S1 left lateral disc protrusion abutting the S1 nerve root, unchanged.  NEURAL FORAMINA:  Normal.  CONUS MEDULLARIS: Normal.  SPINAL CANAL:  No other intradural or extradural defects areseen. No abnormal enhancement is seen.  MISCELLANEOUS:  None.    IMPRESSION:  Motion degraded exam.    Mild spondylitic changes.    No spinal cord or intraspinal abnormality identified.    Prominent nasopharyngeal soft tissue likely representing adenoidal hypertrophy. Correlation with direct visualization is advised.    KACI MENJIVAR MD; Attending Radiologist  This document has been electronically signed. May  2 2021  8:17AM    < end of copied text >    < from: MR Head No Cont (03.25.20 @ 18:57) >     EXAM:  MR BRAIN                          PROCEDURE DATE:  03/25/2020      INTERPRETATION:  INDICATION:    Stroke. Status post TPA.  TECHNIQUE:  Multiplanar brain imaging was conducted using a 1.5 Addie magnet.  T1, T2 and FLAIR imaging was performed.  In addition, diffusion imaging, diffusion coefficient assessment (ADC) and echo planar T2* was incorporated. No contrast administered    COMPARISON EXAMINATION:  CT dated 3/25/2020    FINDINGS:  HEMISPHERES:  No diffusion restriction or acuteischemia is noted. There are scattered areas of T2 hyperintensity in the peritrigonal white matter which is nonspecific. A differential includes inflammatory etiologies, demyelinating disease, and/or Lyme's disease. No microhemorrhage or mass is noted.  VENTRICLES:  midline and normal in size  POSTERIOR FOSSA:  The brain stem and cerebellum are unremarkable in appearance.  No CP angle abnormality is noted.  EXTRA-AXIAL:  No mass or collections are depicted.  CRANIAL NERVES:  No abnormality noted.  VASCULATURE:  The major vessels and venous sinuses are grossly patent.    SINUSES AND MASTOIDS:  The coastal thickening noted in the sinuses  MISCELLANEOUS:  No orbital or pituitary abnormality noted.  No skull base lesion suggested.    IMPRESSION:    1)  no diffusion restriction or MR evidence of acute ischemia. Periventricular white matter findings raise the possibility of an underlying inflammatory etiology and/or demyelinating disease. Further workup and assessment recommended..  2)  mucosal thickening noted in the sinuses. Mastoids are clear..     LINDSEY FIGUEROA M.D., ATTENDING RADIOLOGIST  This document has been electronically signed. Mar 25 2020  6:51PM      < end of copied text >

## 2022-08-31 NOTE — ED ADULT NURSE NOTE - OBJECTIVE STATEMENT
Patient received in room 26, A/Ox4, ambulatory at baseline, p/w multiple complaints. Hx. HIV myelopathy and GBS. Patient states he has had worsening LE weakness x 2 days and urinary retention x 1 day. Endorses chronic back pain. Denies fever, chills, N/V, chest pain and SOB. 14fr. gonzales placed, about 150mL of dark yellow output noted. 20G IV placed to left forearm. Bed in lowest position, call bell within reach.

## 2022-08-31 NOTE — ED PROVIDER NOTE - ATTENDING CONTRIBUTION TO CARE
I performed a face-to-face evaluation of the patient and performed a history and physical examination along with the resident or ACP, and/or medical student above.  I agree with the history and physical examination as documented by the resident or ACP, and/or medical student above.  Abhinav:  31yo M w/ pmh as above, p/w urinary retention and acute on chronic worsening LE weakness. Brizuela cath, labs, imaging, neuro eval, tba.

## 2022-08-31 NOTE — H&P ADULT - NSHPLABSRESULTS_GEN_ALL_CORE
LABS:                         13.1   6.05  )-----------( 273      ( 31 Aug 2022 05:35 )             38.9         136  |  100  |  17  ----------------------------<  81  3.6   |  21<L>  |  0.94    Ca    9.3      31 Aug 2022 05:35    TPro  8.2  /  Alb  4.5  /  TBili  0.5  /  DBili  x   /  AST  26  /  ALT  17  /  AlkPhos  70      Urinalysis Basic - ( 31 Aug 2022 05:28 )    Color: Yellow / Appearance: Clear / S.033 / pH: x  Gluc: x / Ketone: Negative  / Bili: Negative / Urobili: 3 mg/dL   Blood: x / Protein: 30 mg/dL / Nitrite: Negative   Leuk Esterase: Negative / RBC: 237 /HPF / WBC 6 /HPF   Sq Epi: x / Non Sq Epi: 4 /HPF / Bacteria: Few    RADIOLOGY, EKG & ADDITIONAL TESTS: Reviewed.   < from: CT Head No Cont (22 @ 09:38) >    FINDINGS:  VENTRICLES AND SULCI:  Mildly prominent in size, unchanged.  INTRA-AXIAL:  No mass, blood or abnormal attenuation is seen.  EXTRA-AXIAL:  No mass or collection is seen.  VISUALIZED SINUSES:  Clear.  VISUALIZED MASTOIDS:  Clear.  CALVARIUM:  Normal.  MISCELLANEOUS:  Prominent nasopharyngeal soft tissue is seen.    IMPRESSION:  Stable exam.    No mass effect, hemorrhage or evidence of acute intracranial pathology.    Prominent nasopharyngeal soft tissue. Correlation with direct   visualization is advised.

## 2022-08-31 NOTE — ED PROVIDER NOTE - PHYSICAL EXAMINATION
LOS:     VITALS:   T(C): 36.8 (08-31-22 @ 03:05), Max: 36.8 (08-31-22 @ 03:05)  HR: 96 (08-31-22 @ 03:05) (96 - 96)  BP: 138/82 (08-31-22 @ 03:05) (138/82 - 138/82)  RR: 18 (08-31-22 @ 03:05) (18 - 18)  SpO2: 100% (08-31-22 @ 03:05) (100% - 100%)    GENERAL: NAD, lying in bed   HEAD:  Atraumatic, Normocephalic  EYES: EOMI, PERRLA, conjunctiva and sclera clear  ENT: Moist mucous membranes  NECK: Supple, No JVD  CHEST/LUNG: Clear to auscultation bilaterally; No rales, rhonchi, wheezing, or rubs. Unlabored respirations  HEART: Regular rate and rhythm; No murmurs, rubs, or gallops  ABDOMEN: BSx4; Soft, + tender to palpation in epigastric area, nondistended  EXTREMITIES:  2+ Peripheral Pulses, brisk capillary refill. No clubbing, cyanosis, or edema  NERVOUS SYSTEM:  A&Ox3, no focal deficits. 2/5 strength in b/l LE. 5/5 strength in b/l upper extremities   SKIN: No rashes or lesions  Psych: Normal mood and affect

## 2022-08-31 NOTE — CHART NOTE - NSCHARTNOTEFT_GEN_A_CORE
Chart reviewed; Labs ordered per neurology recommendation prior to admission and ID consult text paged. Will follow up further recommendations.

## 2022-08-31 NOTE — H&P ADULT - NSHPSOCIALHISTORY_GEN_ALL_CORE
Currently homeless, lives in shelter  Social EtOH use  20 pack year smoking history  History of cocaine use, denies IV drug use

## 2022-08-31 NOTE — ED PROVIDER NOTE - OBJECTIVE STATEMENT
32 year old man with HIV, HIV myelopathy, asthma who is here for urinary retention and acute on chronic worsening lower extremity weakness. Pt states that his LE weakness got worse 2 days ago, he kept tripping on his legs and was not able to walk. He then developed urinary retention for the past 1 days. He states that he 32 year old man with HIV, HIV myelopathy, asthma who is here for urinary retention and acute on chronic worsening lower extremity weakness. Pt states that his LE weakness got worse 2 days ago, he kept tripping on his legs and was not able to walk. He then developed urinary retention for the past 1 days. He states that he has had some dribbling but unable to fully void. He also having sensitivity in b/l LE that he rates as a 10/10 pain. Pt also having chronic back pain. Denies any chest pain, SOB, N/V, fever, chills.

## 2022-08-31 NOTE — ED PROVIDER NOTE - NS ED ROS FT
REVIEW OF SYSTEMS:    CONSTITUTIONAL: + LE weakness weakness. No fevers or chills  EYES:  No visual changes, no eye pain   ENT: No vertigo or throat pain   NECK: No pain or stiffness  RESPIRATORY: No cough, wheezing, hemoptysis; No shortness of breath  CARDIOVASCULAR: No chest pain or palpitations  GASTROINTESTINAL: + suprapubic pain. No nausea, vomiting, or hematemesis; No diarrhea or constipation. No melena or hematochezia.  GENITOURINARY: + urinary retention   NEUROLOGICAL: + LE weakness, + back pain  SKIN: No itching, rashes  Psych: no anxiety or depression

## 2022-08-31 NOTE — H&P ADULT - PROBLEM SELECTOR PLAN 4
Patient with reported history of night terrors as well as possible PTSD  No SI/HI currently  Seen by  during previous hospitalization  c/w home seroquel 200mg qHS and seroquel 100mg qAM

## 2022-08-31 NOTE — CONSULT NOTE ADULT - SUBJECTIVE AND OBJECTIVE BOX
HPI:  32 year old with HIV with prior episodes of le weakness/ incontinence - concern for HIV myelopahty.    Now with progressive LE weakness and incontinence over the last month.    No fever    States he is taking his ART      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      Asthma      Closed fracture of multiple ribs of right side, initial encounter      Cocaine abuse      Chronic sinusitis      Homeless      History of orthopedic surgery  left arm          Allergies    Ceclor (Unknown)  Toradol (Anaphylaxis; Swelling)  Toradol (Swelling)    Intolerances        ANTIMICROBIALS:  bictegravir 50 mG/emtricitabine 200 mG/tenofovir alafenamide 25 mG (BIKTARVY) 1 daily      OTHER MEDS:  acetaminophen     Tablet .. 650 milliGRAM(s) Oral every 6 hours PRN  aluminum hydroxide/magnesium hydroxide/simethicone Suspension 30 milliLiter(s) Oral every 4 hours PRN  baclofen 10 milliGRAM(s) Oral every 8 hours PRN  melatonin 3 milliGRAM(s) Oral at bedtime PRN  QUEtiapine 200 milliGRAM(s) Oral at bedtime  QUEtiapine 100 milliGRAM(s) Oral <User Schedule>      SOCIAL HISTORY:  lives alone  no tobacco  prior coacaine  No IDU    FAMILY HISTORY:  FH: HIV infection (Mother)        REVIEW OF SYSTEMS  [  ] ROS unobtainable because:    [x  ] All other systems negative except as noted below:	    Constitutional:  [ ] fever [ ] chills  [ ] weight loss  x[ ] weakness  Skin:  [ ] rash [ ] phlebitis	  Eyes: [ ] icterus [ ] pain  [ ] discharge	  ENMT: [ ] sore throat  [ ] thrush [ ] ulcers [ ] exudates  Respiratory: [ ] dyspnea [ ] hemoptysis [ ] cough [ ] sputum	  Cardiovascular:  [ ] chest pain [ ] palpitations [ ] edema	  Gastrointestinal:  [ ] nausea [ ] vomiting [ ] diarrhea [ ] constipation [ ] pain	  Genitourinary:  [ ] dysuria [ ] frequency [ ] hematuria [ ] discharge [ ] flank pain  [ ] incontinence  Musculoskeletal:  [ ] myalgias [ ] arthralgias [ ] arthritis  [ ] back pain  Neurological:  [ ] headache [ ] seizures  [ ] confusion/altered mental status  Psychiatric:  [ ] anxiety [ ] depression	  Hematology/Lymphatics:  [ ] lymphadenopathy  Endocrine:  [ ] adrenal [ ] thyroid  Allergic/Immunologic:	 [ ] transplant [ ] seasonal    PHYSICAL EXAM:  General: [ x] non-toxic  HEAD/EYES: [ ] PERRL [ ] white sclera [ ] icterus  ENT:  [ ] normal x] supple [ ] thrush [ ] pharyngeal exudate  Cardiovascular:   [ ] murmur [ x] normal [ ] PPM/AICD  Respiratory:  [x ] clear to ausculation bilaterally  GI:  [x ] soft, non-tender, normal bowel sounds  :  [ ] gonzales [ ] no CVA tenderness   Musculoskeletal:  [ ] no synovitis  Neurologic:  [ ] non-focal exam   Skin:  [x ] no rash  Lymph: [ ] no lymphadenopathy  Psychiatric:  [ ] appropriate affect [ ] alert & oriented  Lines:  [x ] no phlebitis [ ] central line          Drug Dosing Weight  Height (cm): 180.3 (31 Aug 2022 03:05)  Weight (kg): 76.9 (09 Aug 2022 16:40)  BMI (kg/m2): 23.7 (31 Aug 2022 03:05)  BSA (m2): 1.97 (31 Aug 2022 03:05)    Vital Signs Last 24 Hrs  T(F): 97.8 (22 @ 09:28), Max: 98.3 (22 @ 03:05)    Vital Signs Last 24 Hrs  HR: 73 (22 @ 09:28) (68 - 96)  BP: 118/79 (22 @ 09:28) (118/79 - 138/82)  RR: 17 (22 @ 09:28)  SpO2: 100% (22 @ 09:28) (100% - 100%)  Wt(kg): --                          13.1   6.05  )-----------( 273      ( 31 Aug 2022 05:35 )             38.9           136  |  100  |  17  ----------------------------<  81  3.6   |  21<L>  |  0.94    Ca    9.3      31 Aug 2022 05:35    TPro  8.2  /  Alb  4.5  /  TBili  0.5  /  DBili  x   /  AST  26  /  ALT  17  /  AlkPhos  70        Urinalysis Basic - ( 31 Aug 2022 05:28 )    Color: Yellow / Appearance: Clear / S.033 / pH: x  Gluc: x / Ketone: Negative  / Bili: Negative / Urobili: 3 mg/dL   Blood: x / Protein: 30 mg/dL / Nitrite: Negative   Leuk Esterase: Negative / RBC: 237 /HPF / WBC 6 /HPF   Sq Epi: x / Non Sq Epi: 4 /HPF / Bacteria: Few        MICROBIOLOGY:    RADIOLOGY:

## 2022-08-31 NOTE — CONSULT NOTE ADULT - ASSESSMENT
32 year old with congential HIV/AIDS, with recurrent incontinence and lower extremity weakness, presents with incontinence and worsening weakness over the last month.       1) Lower extremity weakness with incontinence    He has had similar prior episodes.     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  Prior CD4 counts have been higher- HIV pyelopathy often seen with more end stage disease.  In addition, he has had prior improvement in symptoms that has been more rapid than I would expet with HIV myelopathy    Consider repeat imaging    Regardless, with HIV myelopathy the treatment is management of HIV.     See notes from 2/3/2022 during prior admission  Neurology following    2) HIV  Continue Biktarvy  last viral load was over 200 k with CD4 of 200  Check CD4   Check Viral load  Adherence is a concern    Follow up as outpt with HIV

## 2022-08-31 NOTE — ED ADULT TRIAGE NOTE - CHIEF COMPLAINT QUOTE
Pt BIBEMS c/o urinary retention X 8 hours and left foot pain. No chest pain, sob, n/v/d, fevers/chills verbalized.  Pmhx: HIV, Ashtma, Cocaine abuse Pt BIBEMS c/o urinary retention X 8 hours, generalized weakness and left foot pain. No chest pain, sob, n/v/d, fevers/chills verbalized.  Pmhx: HIV, Asthma, Gu[place]an [place] syndrome, Cocaine abuse

## 2022-08-31 NOTE — CONSULT NOTE ADULT - ATTENDING COMMENTS
32 year old man with congenital HIV, chronic back pain, substance use, HIV myelopathy, asthma who is here for fluctuations in worsening lower extremity weakness and urinary retention. There are exam findings cw myelopathy ie. increase lower extremity tone, pathological reflexes and urinary retention. Would recommend an MRI Thoracic and lumbar spine STAT. However, patient has been having similar but somewhat progressive for about 2 years without focal imaging pathology.

## 2022-08-31 NOTE — H&P ADULT - NSHPPHYSICALEXAM_GEN_ALL_CORE
GENERAL: NAD, lying in bed comfortably  HEAD: NC/AT,   EYES: EOMI, PERRL  ENT: moist mucus membrane, no oropharyngeal erythema  NECK: Supple, No JVD  CHEST/LUNG: CTAB, no increased WOB  HEART: RRR, no m/r/g  ABDOMEN: soft, NT, ND, BS+  : gonzales catheter in place, draining cloudy urine  EXTREMITIES:  2+ peripheral pulses,   NERVOUS SYSTEM:  A&Ox3, b/l LE weakness 2/5 (however limited by pain on movement), sensation intact throughout   MSK: FROM all 4 extremities diminished strength in b/l LE, UE strength normal  SKIN: No rashes or lesions

## 2022-08-31 NOTE — H&P ADULT - PROBLEM SELECTOR PLAN 1
Patient presenting with recurrent bilateral LE weakness similar to previous episodes  Patient with extensive workup performed previously, including multiple rounds of MR imaging of spine  Neurology following, recs appreciated  Will f/u results of CK, myoglobin, aldolase, ACE, syphillis screen, ANCA, and lyme titers  MRI T/L spine ordered, pending  PT evaluation

## 2022-08-31 NOTE — ED PROVIDER NOTE - CLINICAL SUMMARY MEDICAL DECISION MAKING FREE TEXT BOX
33yo p/w urinary retention and worsening LE weakness. Concern for spinal compression vs transverse myelitis . Plan to get labs, gonzales, imaging and Neuro eval.

## 2022-08-31 NOTE — CONSULT NOTE ADULT - ASSESSMENT
Recommendations: INCOMPLETE, not finalized  [ ] Noted low B12 level on last admission. Goal as per neuro at least >400. Can contribute to fatigue, lower extremity weakness, gait difficulties/ ataxia. Replete if not done before with cyanocobalamin 1000mcg IM x 7days, followed by PO same dose x 2 weeks, recheck level with retic count in a month. If able to coordinate injections in outpatient setting, can obtain them similarly there.  [x] In Aug 9/10: B6 9.3, folate 8.1, a1c 4.6, zinc 86, copper 113  [ ] TSH, ACE level, syphilis screen, ANCA, lyme screen, HIV quantifcation studies (cd4 count, viral load), Vit E      Recommendations: INCOMPLETE, not finalized  [ ] Noted low B12 level on last admission. Goal as per neuro at least >400. Can contribute to fatigue, lower extremity weakness, gait difficulties/ ataxia. Replete if not done before with cyanocobalamin 1000mcg IM x 7days, followed by PO same dose x 2 weeks, recheck level with retic count in a month. If able to coordinate injections in outpatient setting, can obtain them similarly there.  [x] In Aug 9/10: B6 9.3, folate 8.1, a1c 4.6, zinc 86, copper 113  [ ] TSH, ACE level, syphilis screen, ANCA, lyme screen, HIV quantifcation studies (cd4 count, viral load), Vit E   [ ] CK as it was elevated in the past, myoglobin, aldolase, ESR/ CRP  [ ] bladder scan,  Patient LS is a 32 year old man with congenital HIV, chronic back pain, substance use, HIV myelopathy, asthma who is here for fluctuations in worsening lower extremity weakness and urinary retention. Of note was seen by neurology here in 8/9/22 for 3 days of LE weakness of toes to hips, 2-3 days bowel/bladder incontinence, lower back pain, muscle spasms in lumbar back. Did not have saddle anesthesia then or now. Takes baclofen, gabapentin, lyrica. Has been seen multiple times in past by neurology for similar complaints of b/l LE weakness. Prior workup was not c/w GBS but potentially myelopathy 2/2 HIV infection. Has hx of normal LP and normal spine MRI's. MRI lumbar spines (6/5/21, 5/21/21, 5/2/21, 10/26/20). MRI cervical spine (5/2/21), MRI thoracic spine (5/2/21, 10/26/20), MRI head (3/25/20). HIE notes from other systems report EMG/NCS in 2021 as being normal.     Impression: Concern for progression of HIV myelopathy. Difficult to evaluate for spastic paraparesis given pain limitation although has urinary symptom involvement. In these cases MRI can be normal and CSF studies may be nonspecific. Aggressive anti-retroviral treatment can produce improvement in symptoms. In addition, has been evaluated in the past for demyelinating process and structural causes of myelopathy without supportive imaging evidence. Moreover, vascular insult to spinal cord would produce lasting deficits although his symptoms fluctuate.        Recommendations:   [ ] Noted low B12 level on last admission. Goal as per neuro at least >400. Can contribute to fatigue, lower extremity weakness, gait difficulties/ ataxia. Replete if not done before with cyanocobalamin 1000mcg IM x 7days, followed by PO same dose x 2 weeks, recheck level with retic count in a month. If able to coordinate injections in outpatient setting, can obtain them similarly there.  [x] In Aug 9/10: B6 9.3, folate 8.1, a1c 4.6, zinc 86, copper 113  [ ] TSH, ACE level, syphilis screen, ANCA, lyme screen, HIV quantifcation studies (cd4 count, viral load), Vit E   [ ] CK as it was elevated in the past, myoglobin, aldolase, ESR/ CRP  [ ] ID evaluation  [ ] bladder scan, UA, per primary team if should consider urology evaluation  [ ] further neuroimaging per day-team service  [ ] PT/OT  [ ] repeat EMG/NCS in outpatient setting    To be discussed with neurology attending and will be formally staffed by attending during morning rounds. Recommendations will be finalized once signed by attending. Patient LS is a 32 year old man with congenital HIV, chronic back pain, substance use, HIV myelopathy, asthma who is here for fluctuations in worsening lower extremity weakness and urinary retention. Of note was seen by neurology here in 8/9/22 for 3 days of LE weakness of toes to hips, 2-3 days bowel/bladder incontinence, lower back pain, muscle spasms in lumbar back. Did not have saddle anesthesia then or now. Takes baclofen, gabapentin, lyrica. Has been seen multiple times in past by neurology for similar complaints of b/l LE weakness. Prior workup was not c/w GBS but potentially myelopathy 2/2 HIV infection. Has hx of normal LP and normal spine MRI's. MRI lumbar spines (6/5/21, 5/21/21, 5/2/21, 10/26/20). MRI cervical spine (5/2/21), MRI thoracic spine (5/2/21, 10/26/20), MRI head (3/25/20). HIE notes from other systems report EMG/NCS in 2021 as being normal.     Impression: Concern for progression of HIV myelopathy. Difficult to evaluate for spastic paraparesis given pain limitation although has urinary symptom involvement. In these cases MRI can be normal and CSF studies may be nonspecific. Aggressive anti-retroviral treatment can produce improvement in symptoms. In addition, has been evaluated in the past for demyelinating process and structural causes of myelopathy without supportive imaging evidence. Moreover, vascular insult to spinal cord would produce lasting deficits although his symptoms fluctuate.        Recommendations:   [ ] Noted low B12 level on last admission. Goal as per neuro at least >400. Can contribute to fatigue, lower extremity weakness, gait difficulties/ ataxia. Replete if not done before with cyanocobalamin 1000mcg IM x 7days, followed by PO same dose x 2 weeks, recheck level with retic count in a month. If able to coordinate injections in outpatient setting, can obtain them similarly there.  [x] In Aug 9/10: B6 9.3, folate 8.1, a1c 4.6, zinc 86, copper 113  [ ] TSH, ACE level, syphilis screen, ANCA, lyme screen, HIV quantifcation studies (cd4 count, viral load), Vit E   [ ] CK as it was elevated in the past, myoglobin, aldolase, ESR/ CRP  [ ] ID evaluation  [ ] bladder scan, UA, per primary team if should consider urology evaluation  [ ] can obtain MRI lumbar and thoracic spine w/ w/o con to evaluate for interval changes from prior imaging.  [ ] PT/OT  [ ] repeat EMG/NCS in outpatient setting    Discussed with neurology attending and will be formally staffed by attending during morning rounds. Recommendations will be finalized once signed by attending.

## 2022-08-31 NOTE — H&P ADULT - PROBLEM SELECTOR PLAN 2
Patient also presenting with 1 day of difficulty with urination  Reports that he has been straining to urinate but unable to  No dysuria/hematuria  Likely 2/2 to HIV myelopathy  S/p gonzales catheter placement in ED, currently with some cloudy urine in gonzales bag  cont to monitor UOP  Will consider TOV on 9/1

## 2022-09-01 LAB
ACE SERPL-CCNC: 57 U/L — SIGNIFICANT CHANGE UP (ref 14–82)
ALDOLASE SERPL-CCNC: 5.6 U/L — SIGNIFICANT CHANGE UP (ref 3.3–10.3)
ANION GAP SERPL CALC-SCNC: 11 MMOL/L — SIGNIFICANT CHANGE UP (ref 7–14)
AUTO DIFF PNL BLD: NEGATIVE — SIGNIFICANT CHANGE UP
B BURGDOR C6 AB SER-ACNC: NEGATIVE — SIGNIFICANT CHANGE UP
B BURGDOR IGG+IGM SER-ACNC: 0.12 INDEX — SIGNIFICANT CHANGE UP (ref 0.01–0.89)
BUN SERPL-MCNC: 14 MG/DL — SIGNIFICANT CHANGE UP (ref 7–23)
C-ANCA SER-ACNC: NEGATIVE — SIGNIFICANT CHANGE UP
CALCIUM SERPL-MCNC: 8.8 MG/DL — SIGNIFICANT CHANGE UP (ref 8.4–10.5)
CHLORIDE SERPL-SCNC: 106 MMOL/L — SIGNIFICANT CHANGE UP (ref 98–107)
CO2 SERPL-SCNC: 23 MMOL/L — SIGNIFICANT CHANGE UP (ref 22–31)
CREAT SERPL-MCNC: 0.82 MG/DL — SIGNIFICANT CHANGE UP (ref 0.5–1.3)
EGFR: 119 ML/MIN/1.73M2 — SIGNIFICANT CHANGE UP
GLUCOSE SERPL-MCNC: 92 MG/DL — SIGNIFICANT CHANGE UP (ref 70–99)
HCT VFR BLD CALC: 37.1 % — LOW (ref 39–50)
HGB BLD-MCNC: 11.9 G/DL — LOW (ref 13–17)
HIV-1 VIRAL LOAD RESULT: ABNORMAL
HIV1 RNA # SERPL NAA+PROBE: 856 — SIGNIFICANT CHANGE UP
HIV1 RNA SER-IMP: SIGNIFICANT CHANGE UP
HIV1 RNA SERPL NAA+PROBE-ACNC: ABNORMAL
HIV1 RNA SERPL NAA+PROBE-LOG#: 2.93 — SIGNIFICANT CHANGE UP
HIV1+2 AB SPEC QL: ABNORMAL
HIV1+2 AB SPEC QL: SIGNIFICANT CHANGE UP
MAGNESIUM SERPL-MCNC: 1.6 MG/DL — SIGNIFICANT CHANGE UP (ref 1.6–2.6)
MCHC RBC-ENTMCNC: 28.6 PG — SIGNIFICANT CHANGE UP (ref 27–34)
MCHC RBC-ENTMCNC: 32.1 GM/DL — SIGNIFICANT CHANGE UP (ref 32–36)
MCV RBC AUTO: 89.2 FL — SIGNIFICANT CHANGE UP (ref 80–100)
NRBC # BLD: 0 /100 WBCS — SIGNIFICANT CHANGE UP (ref 0–0)
NRBC # FLD: 0 K/UL — SIGNIFICANT CHANGE UP (ref 0–0)
P-ANCA SER-ACNC: NEGATIVE — SIGNIFICANT CHANGE UP
PHOSPHATE SERPL-MCNC: 3.5 MG/DL — SIGNIFICANT CHANGE UP (ref 2.5–4.5)
PLATELET # BLD AUTO: 255 K/UL — SIGNIFICANT CHANGE UP (ref 150–400)
POTASSIUM SERPL-MCNC: 3.4 MMOL/L — LOW (ref 3.5–5.3)
POTASSIUM SERPL-SCNC: 3.4 MMOL/L — LOW (ref 3.5–5.3)
RBC # BLD: 4.16 M/UL — LOW (ref 4.2–5.8)
RBC # FLD: 13.2 % — SIGNIFICANT CHANGE UP (ref 10.3–14.5)
SODIUM SERPL-SCNC: 140 MMOL/L — SIGNIFICANT CHANGE UP (ref 135–145)
T PALLIDUM AB TITR SER: NEGATIVE — SIGNIFICANT CHANGE UP
WBC # BLD: 3.9 K/UL — SIGNIFICANT CHANGE UP (ref 3.8–10.5)
WBC # FLD AUTO: 3.9 K/UL — SIGNIFICANT CHANGE UP (ref 3.8–10.5)

## 2022-09-01 PROCEDURE — 99232 SBSQ HOSP IP/OBS MODERATE 35: CPT

## 2022-09-01 RX ORDER — INFLUENZA VIRUS VACCINE 15; 15; 15; 15 UG/.5ML; UG/.5ML; UG/.5ML; UG/.5ML
0.5 SUSPENSION INTRAMUSCULAR ONCE
Refills: 0 | Status: DISCONTINUED | OUTPATIENT
Start: 2022-09-01 | End: 2022-09-03

## 2022-09-01 RX ORDER — POTASSIUM CHLORIDE 20 MEQ
20 PACKET (EA) ORAL ONCE
Refills: 0 | Status: COMPLETED | OUTPATIENT
Start: 2022-09-01 | End: 2022-09-01

## 2022-09-01 RX ADMIN — Medication 50 MILLIGRAM(S): at 22:26

## 2022-09-01 RX ADMIN — ENOXAPARIN SODIUM 40 MILLIGRAM(S): 100 INJECTION SUBCUTANEOUS at 22:23

## 2022-09-01 RX ADMIN — QUETIAPINE FUMARATE 100 MILLIGRAM(S): 200 TABLET, FILM COATED ORAL at 12:38

## 2022-09-01 RX ADMIN — Medication 50 MILLIGRAM(S): at 14:06

## 2022-09-01 RX ADMIN — Medication 50 MILLIGRAM(S): at 07:09

## 2022-09-01 RX ADMIN — Medication 20 MILLIEQUIVALENT(S): at 12:37

## 2022-09-01 RX ADMIN — BICTEGRAVIR SODIUM, EMTRICITABINE, AND TENOFOVIR ALAFENAMIDE FUMARATE 1 TABLET(S): 30; 120; 15 TABLET ORAL at 12:38

## 2022-09-01 RX ADMIN — QUETIAPINE FUMARATE 200 MILLIGRAM(S): 200 TABLET, FILM COATED ORAL at 22:23

## 2022-09-01 NOTE — PHYSICAL THERAPY INITIAL EVALUATION ADULT - PERTINENT HX OF CURRENT PROBLEM, REHAB EVAL
Pt. admitted for LE weakness, urinary retention. Per radiology report, CT head revealed no mass effect, hemorrhage or evidence of acute intracranial pathology.

## 2022-09-01 NOTE — PHYSICAL THERAPY INITIAL EVALUATION ADULT - ADDITIONAL COMMENTS
Pt. reports ambulating with a straight cane occasionally.     Pt. was left in bed post PT Evaluation, no apparent distress, all lines intact, PCA present. Flavio MENDEZ made aware.

## 2022-09-01 NOTE — PHYSICAL THERAPY INITIAL EVALUATION ADULT - GAIT DISTANCE, PT EVAL
10 feet without assistive device then 10 feet with use of rolling walker. Limited secondary to LE weakness.

## 2022-09-01 NOTE — PROGRESS NOTE ADULT - SUBJECTIVE AND OBJECTIVE BOX
[facility] Division of Hospital Medicine  Carlton LEA (Kent) MD Bryon  Pager [phone]    SUBJECTIVE:  Chief complaint: Leg weakness.    Pt seen and evaluated at bedside this AM. No o/n events. States that his weakness remains unchanged.       ROS: All systems negative except as noted.      Vital Signs Last 24 Hrs  T(C): 36.8 (01 Sep 2022 10:13), Max: 36.8 (01 Sep 2022 08:20)  T(F): 98.2 (01 Sep 2022 10:13), Max: 98.2 (01 Sep 2022 08:20)  HR: 73 (01 Sep 2022 10:13) (71 - 76)  BP: 123/66 (01 Sep 2022 10:13) (120/71 - 128/82)  BP(mean): --  RR: 18 (01 Sep 2022 10:13) (16 - 18)  SpO2: 100% (01 Sep 2022 10:13) (98% - 100%)    Parameters below as of 01 Sep 2022 10:13  Patient On (Oxygen Delivery Method): room air          PHYSICAL EXAM:  Gen- In bed, comfortable, NAD  Eyes- EOMI, PERRLA, nonicteric.  EMNT- Fair dentition. MMM. No tonsilar exudates. No posterior pharynx erythema.  Neck- Supple. No masses. No tracheal deviation.  Resp- CTAB, good effort. No r/r/w. No accessory muscle use.  CVS- RRR, S1S2, no g/r/m. No LE edema.  GI- Soft abd, NT, ND, +BSx4. No HSM.  MSK- No C/C. ROM intact. No crepitus.  Neuro- CN II-XII intact. Speech fluent/face symmetric. Sensation intact. Strength diminished @ LE - 3+/5.  Skin- No rashes/ulcers. Warm/moist.  Psych- AAOx3. Appropriate mood/affect.      MEDICATION:  MEDICATIONS  (STANDING):  bictegravir 50 mG/emtricitabine 200 mG/tenofovir alafenamide 25 mG (BIKTARVY) 1 Tablet(s) Oral daily  enoxaparin Injectable 40 milliGRAM(s) SubCutaneous every 24 hours  influenza   Vaccine 0.5 milliLiter(s) IntraMuscular once  pregabalin 50 milliGRAM(s) Oral three times a day  QUEtiapine 100 milliGRAM(s) Oral <User Schedule>  QUEtiapine 200 milliGRAM(s) Oral at bedtime    MEDICATIONS  (PRN):  acetaminophen     Tablet .. 650 milliGRAM(s) Oral every 6 hours PRN Temp greater or equal to 38C (100.4F), Mild Pain (1 - 3)  aluminum hydroxide/magnesium hydroxide/simethicone Suspension 30 milliLiter(s) Oral every 4 hours PRN Dyspepsia  baclofen 10 milliGRAM(s) Oral every 8 hours PRN Muscle Spasm  diphenhydrAMINE 25 milliGRAM(s) Oral every 6 hours PRN Rash and/or Itching  melatonin 3 milliGRAM(s) Oral at bedtime PRN Insomnia  oxyCODONE    IR 10 milliGRAM(s) Oral every 6 hours PRN Severe Pain (7 - 10)  oxyCODONE    IR 5 milliGRAM(s) Oral every 6 hours PRN Moderate Pain (4 - 6)            LABORATORY:                          11.9   3.90  )-----------( 255      ( 01 Sep 2022 06:09 )             37.1         140  |  106  |  14  ----------------------------<  92  3.4<L>   |  23  |  0.82    Ca    8.8      01 Sep 2022 06:09  Phos  3.5       Mg     1.60         TPro  8.2  /  Alb  4.5  /  TBili  0.5  /  DBili  x   /  AST  26  /  ALT  17  /  AlkPhos  70        Urinalysis Basic - ( 31 Aug 2022 05:28 )    Color: Yellow / Appearance: Clear / S.033 / pH: x  Gluc: x / Ketone: Negative  / Bili: Negative / Urobili: 3 mg/dL   Blood: x / Protein: 30 mg/dL / Nitrite: Negative   Leuk Esterase: Negative / RBC: 237 /HPF / WBC 6 /HPF   Sq Epi: x / Non Sq Epi: 4 /HPF / Bacteria: Few      CARDIAC MARKERS ( 31 Aug 2022 08:36 )  x     / x     / 178 U/L / x     / x            SARS-CoV-2: NotDetec (31 Aug 2022 05:46)  COVID-19 PCR: NotDetec (15 Aug 2022 09:10)  COVID-19 PCR: NotDetec (12 Aug 2022 18:30)  SARS-CoV-2: NotDetec (09 Aug 2022 04:13)  COVID-19 PCR: NotDete (20 May 2022 23:45)

## 2022-09-01 NOTE — PROGRESS NOTE ADULT - SUBJECTIVE AND OBJECTIVE BOX
Follow Up:      Inverval History/ROS:Patient is a 32y old  Male who presents with a chief complaint of LE Weakness (01 Sep 2022 15:30)    No fever  Moves all ext- states he can walk to bathroom with assistance  Catheter placed for incontinence  Allergies    Ceclor (Unknown)  Toradol (Anaphylaxis; Swelling)  Toradol (Swelling)    Intolerances        ANTIMICROBIALS:  bictegravir 50 mG/emtricitabine 200 mG/tenofovir alafenamide 25 mG (BIKTARVY) 1 daily      OTHER MEDS:  acetaminophen     Tablet .. 650 milliGRAM(s) Oral every 6 hours PRN  aluminum hydroxide/magnesium hydroxide/simethicone Suspension 30 milliLiter(s) Oral every 4 hours PRN  baclofen 10 milliGRAM(s) Oral every 8 hours PRN  diphenhydrAMINE 25 milliGRAM(s) Oral every 6 hours PRN  enoxaparin Injectable 40 milliGRAM(s) SubCutaneous every 24 hours  influenza   Vaccine 0.5 milliLiter(s) IntraMuscular once  melatonin 3 milliGRAM(s) Oral at bedtime PRN  oxyCODONE    IR 10 milliGRAM(s) Oral every 6 hours PRN  oxyCODONE    IR 5 milliGRAM(s) Oral every 6 hours PRN  pregabalin 50 milliGRAM(s) Oral three times a day  QUEtiapine 100 milliGRAM(s) Oral <User Schedule>  QUEtiapine 200 milliGRAM(s) Oral at bedtime      Vital Signs Last 24 Hrs  T(C): 36.4 (01 Sep 2022 17:36), Max: 36.8 (01 Sep 2022 08:20)  T(F): 97.5 (01 Sep 2022 17:36), Max: 98.2 (01 Sep 2022 08:20)  HR: 71 (01 Sep 2022 17:36) (61 - 76)  BP: 134/81 (01 Sep 2022 17:36) (115/64 - 134/81)  BP(mean): --  RR: 17 (01 Sep 2022 17:36) (16 - 18)  SpO2: 100% (01 Sep 2022 17:36) (98% - 100%)    Parameters below as of 01 Sep 2022 17:36  Patient On (Oxygen Delivery Method): room air        PHYSICAL EXAM:  General: [x ] non-toxic  HEAD/EYES: [ ] PERRL [x ] white sclera [ ] icterus  ENT:  [ ] normal [ ] supple [ ] thrush [ ] pharyngeal exudate  Cardiovascular:   [ ] murmur x[ ] normal [ ] PPM/AICD  Respiratory:  [ x] clear to ausculation bilaterally  GI:  [ ] soft, non-tender, normal bowel sounds  :  [ ] gonzales [ ] no CVA tenderness   Musculoskeletal:  [ ] no synovitis  Neurologic:  [ ] non-focal exam   Skin:  [ x] no rash  Lymph: [ ] no lymphadenopathy  Psychiatric:  [ ] appropriate affect [ x] alert & oriented  Lines:  [x ] no phlebitis [ ] central line                                11.9   3.90  )-----------( 255      ( 01 Sep 2022 06:09 )             37.1       09-    140  |  106  |  14  ----------------------------<  92  3.4<L>   |  23  |  0.82    Ca    8.8      01 Sep 2022 06:09  Phos  3.5     -  Mg     1.60     -    TPro  8.2  /  Alb  4.5  /  TBili  0.5  /  DBili  x   /  AST  26  /  ALT  17  /  AlkPhos  70  08-      Urinalysis Basic - ( 31 Aug 2022 05:28 )    Color: Yellow / Appearance: Clear / S.033 / pH: x  Gluc: x / Ketone: Negative  / Bili: Negative / Urobili: 3 mg/dL   Blood: x / Protein: 30 mg/dL / Nitrite: Negative   Leuk Esterase: Negative / RBC: 237 /HPF / WBC 6 /HPF   Sq Epi: x / Non Sq Epi: 4 /HPF / Bacteria: Few        MICROBIOLOGY:    RADIOLOGY:

## 2022-09-01 NOTE — PROGRESS NOTE ADULT - ATTENDING COMMENTS
Case seen September 1, 2022  Recommend MRI cervical thoraco lumbar region Urgently in view of clinical exam.

## 2022-09-01 NOTE — PROGRESS NOTE ADULT - PROBLEM SELECTOR PLAN 3
Patient with congenital HIV  Following with ID, Dr. Hunter  CD4 count [id number],   c/w [email or web address]  Appreciate ID recs.

## 2022-09-01 NOTE — PROGRESS NOTE ADULT - PROBLEM SELECTOR PLAN 2
Patient also presenting with 1 day of difficulty with urination  Reports that he has been straining to urinate but unable to  No dysuria/hematuria  Likely 2/2 to HIV myelopathy  S/p gonzales catheter placement in ED, currently with some cloudy urine in gonzales bag  cont to monitor UOP  -Likely component of deconditioning as well. Would encourage OOB2C. Optimize BMs. Re-eval in 24h for TOV.

## 2022-09-01 NOTE — PROGRESS NOTE ADULT - PROBLEM SELECTOR PLAN 1
-Weakness unchanged. Neuro on board.   -MRI T/L spine ordered - pt reports severe claustrophobia. Unable to tolerate study in the past - will need MRI under sedation.   -TSH/ -Weakness unchanged. Neuro on board.   -MRI T/L spine ordered - pt reports severe claustrophobia. Unable to tolerate study in the past - will need MRI under sedation.   -Serologies neg.   -Cont to follow up neuro.  -Appreciate PT recs - will need BRAYDEN.

## 2022-09-01 NOTE — PROGRESS NOTE ADULT - PROBLEM SELECTOR PLAN 5
DVT: Lovenox  Diet: Regular  Dispo: patient currently homeless, living in shelter,  consult placed for assistance. Pending further medical workup. Imaging is pending.

## 2022-09-01 NOTE — PHYSICAL THERAPY INITIAL EVALUATION ADULT - LIVES WITH, PROFILE
Lives in an apartment alone, no steps to negotiate. Pt. reports he lives in an apartment alone, no steps to negotiate.

## 2022-09-02 LAB
ANION GAP SERPL CALC-SCNC: 11 MMOL/L — SIGNIFICANT CHANGE UP (ref 7–14)
BASOPHILS # BLD AUTO: 0.01 K/UL — SIGNIFICANT CHANGE UP (ref 0–0.2)
BASOPHILS NFR BLD AUTO: 0.3 % — SIGNIFICANT CHANGE UP (ref 0–2)
BUN SERPL-MCNC: 7 MG/DL — SIGNIFICANT CHANGE UP (ref 7–23)
CALCIUM SERPL-MCNC: 9.1 MG/DL — SIGNIFICANT CHANGE UP (ref 8.4–10.5)
CHLORIDE SERPL-SCNC: 105 MMOL/L — SIGNIFICANT CHANGE UP (ref 98–107)
CO2 SERPL-SCNC: 21 MMOL/L — LOW (ref 22–31)
CREAT SERPL-MCNC: 0.72 MG/DL — SIGNIFICANT CHANGE UP (ref 0.5–1.3)
EGFR: 124 ML/MIN/1.73M2 — SIGNIFICANT CHANGE UP
EOSINOPHIL # BLD AUTO: 0.22 K/UL — SIGNIFICANT CHANGE UP (ref 0–0.5)
EOSINOPHIL NFR BLD AUTO: 6.3 % — HIGH (ref 0–6)
GLUCOSE SERPL-MCNC: 83 MG/DL — SIGNIFICANT CHANGE UP (ref 70–99)
HCT VFR BLD CALC: 38 % — LOW (ref 39–50)
HGB BLD-MCNC: 12.8 G/DL — LOW (ref 13–17)
IANC: 0.71 K/UL — LOW (ref 1.8–7.4)
IMM GRANULOCYTES NFR BLD AUTO: 0 % — SIGNIFICANT CHANGE UP (ref 0–1.5)
LYMPHOCYTES # BLD AUTO: 2.24 K/UL — SIGNIFICANT CHANGE UP (ref 1–3.3)
LYMPHOCYTES # BLD AUTO: 63.6 % — HIGH (ref 13–44)
MAGNESIUM SERPL-MCNC: 1.5 MG/DL — LOW (ref 1.6–2.6)
MCHC RBC-ENTMCNC: 29.8 PG — SIGNIFICANT CHANGE UP (ref 27–34)
MCHC RBC-ENTMCNC: 33.7 GM/DL — SIGNIFICANT CHANGE UP (ref 32–36)
MCV RBC AUTO: 88.6 FL — SIGNIFICANT CHANGE UP (ref 80–100)
MONOCYTES # BLD AUTO: 0.34 K/UL — SIGNIFICANT CHANGE UP (ref 0–0.9)
MONOCYTES NFR BLD AUTO: 9.7 % — SIGNIFICANT CHANGE UP (ref 2–14)
NEUTROPHILS # BLD AUTO: 0.71 K/UL — LOW (ref 1.8–7.4)
NEUTROPHILS NFR BLD AUTO: 20.1 % — LOW (ref 43–77)
NRBC # BLD: 0 /100 WBCS — SIGNIFICANT CHANGE UP (ref 0–0)
NRBC # FLD: 0 K/UL — SIGNIFICANT CHANGE UP (ref 0–0)
PHOSPHATE SERPL-MCNC: 3 MG/DL — SIGNIFICANT CHANGE UP (ref 2.5–4.5)
PLATELET # BLD AUTO: 276 K/UL — SIGNIFICANT CHANGE UP (ref 150–400)
POTASSIUM SERPL-MCNC: 4 MMOL/L — SIGNIFICANT CHANGE UP (ref 3.5–5.3)
POTASSIUM SERPL-SCNC: 4 MMOL/L — SIGNIFICANT CHANGE UP (ref 3.5–5.3)
RBC # BLD: 4.29 M/UL — SIGNIFICANT CHANGE UP (ref 4.2–5.8)
RBC # FLD: 13.3 % — SIGNIFICANT CHANGE UP (ref 10.3–14.5)
SODIUM SERPL-SCNC: 137 MMOL/L — SIGNIFICANT CHANGE UP (ref 135–145)
WBC # BLD: 3.52 K/UL — LOW (ref 3.8–10.5)
WBC # FLD AUTO: 3.52 K/UL — LOW (ref 3.8–10.5)

## 2022-09-02 PROCEDURE — 99232 SBSQ HOSP IP/OBS MODERATE 35: CPT

## 2022-09-02 PROCEDURE — 72147 MRI CHEST SPINE W/DYE: CPT | Mod: 26

## 2022-09-02 PROCEDURE — 72149 MRI LUMBAR SPINE W/DYE: CPT | Mod: 26

## 2022-09-02 RX ORDER — MAGNESIUM SULFATE 500 MG/ML
2 VIAL (ML) INJECTION ONCE
Refills: 0 | Status: COMPLETED | OUTPATIENT
Start: 2022-09-02 | End: 2022-09-02

## 2022-09-02 RX ADMIN — QUETIAPINE FUMARATE 200 MILLIGRAM(S): 200 TABLET, FILM COATED ORAL at 22:40

## 2022-09-02 RX ADMIN — Medication 50 MILLIGRAM(S): at 22:42

## 2022-09-02 RX ADMIN — BICTEGRAVIR SODIUM, EMTRICITABINE, AND TENOFOVIR ALAFENAMIDE FUMARATE 1 TABLET(S): 30; 120; 15 TABLET ORAL at 15:44

## 2022-09-02 RX ADMIN — Medication 50 MILLIGRAM(S): at 15:49

## 2022-09-02 RX ADMIN — QUETIAPINE FUMARATE 100 MILLIGRAM(S): 200 TABLET, FILM COATED ORAL at 07:12

## 2022-09-02 RX ADMIN — Medication 50 MILLIGRAM(S): at 05:32

## 2022-09-02 RX ADMIN — Medication 25 GRAM(S): at 15:50

## 2022-09-02 NOTE — PROGRESS NOTE ADULT - SUBJECTIVE AND OBJECTIVE BOX
Follow Up:      Inverval History/ROS:Patient is a 32y old  Male who presents with a chief complaint of LE Weakness (02 Sep 2022 14:04)  Gonzales in place  Moves LE      Allergies    Ceclor (Unknown)  Toradol (Anaphylaxis; Swelling)  Toradol (Swelling)    Intolerances        ANTIMICROBIALS:  bictegravir 50 mG/emtricitabine 200 mG/tenofovir alafenamide 25 mG (BIKTARVY) 1 daily      OTHER MEDS:  acetaminophen     Tablet .. 650 milliGRAM(s) Oral every 6 hours PRN  aluminum hydroxide/magnesium hydroxide/simethicone Suspension 30 milliLiter(s) Oral every 4 hours PRN  baclofen 10 milliGRAM(s) Oral every 8 hours PRN  diphenhydrAMINE 25 milliGRAM(s) Oral every 6 hours PRN  enoxaparin Injectable 40 milliGRAM(s) SubCutaneous every 24 hours  influenza   Vaccine 0.5 milliLiter(s) IntraMuscular once  melatonin 3 milliGRAM(s) Oral at bedtime PRN  oxyCODONE    IR 10 milliGRAM(s) Oral every 6 hours PRN  oxyCODONE    IR 5 milliGRAM(s) Oral every 6 hours PRN  pregabalin 50 milliGRAM(s) Oral three times a day  QUEtiapine 100 milliGRAM(s) Oral <User Schedule>  QUEtiapine 200 milliGRAM(s) Oral at bedtime      Vital Signs Last 24 Hrs  T(C): 36.5 (02 Sep 2022 10:23), Max: 36.9 (01 Sep 2022 21:55)  T(F): 97.7 (02 Sep 2022 10:23), Max: 98.5 (01 Sep 2022 21:55)  HR: 67 (02 Sep 2022 10:23) (65 - 71)  BP: 115/76 (02 Sep 2022 10:23) (115/76 - 134/81)  BP(mean): --  RR: 18 (02 Sep 2022 10:23) (17 - 20)  SpO2: 99% (02 Sep 2022 10:23) (99% - 100%)    Parameters below as of 02 Sep 2022 10:23  Patient On (Oxygen Delivery Method): room air        PHYSICAL EXAM:  General: [ ] non-toxic  HEAD/EYES: [ ] PERRL [ x] white sclera [ ] icterus  ENT:  [ ] normal [ x] supple [ ] thrush [ ] pharyngeal exudate  Cardiovascular:   [ ] murmur [x ] normal [ ] PPM/AICD  Respiratory:  [ x] clear to ausculation bilaterally  GI:  [ x] soft, non-tender, normal bowel sounds  :  [ ] gonzales [ ] no CVA tenderness   Musculoskeletal:  [ ] no synovitis  Neurologic:  [ ] non-focal exam   Skin:  [x ] no rash  Lymph: [ x] no lymphadenopathy  Psychiatric:  [ ] appropriate affect [ ] alert & oriented  Lines:  [ x] no phlebitis [ ] central line                                12.8   3.52  )-----------( 276      ( 02 Sep 2022 06:34 )             38.0       09-02    137  |  105  |  7   ----------------------------<  83  4.0   |  21<L>  |  0.72    Ca    9.1      02 Sep 2022 06:34  Phos  3.0     09-02  Mg     1.50     09-02            MICROBIOLOGY:    RADIOLOGY:

## 2022-09-02 NOTE — PROGRESS NOTE ADULT - PROBLEM SELECTOR PLAN 3
Patient with congenital HIV  Following with ID, Dr. Hunter  CD4 count 335,   -c/w Biktarvy  -Repeat VL in 2 wks.   Appreciate ID recs.

## 2022-09-02 NOTE — PROGRESS NOTE ADULT - PROBLEM SELECTOR PLAN 5
DVT: Lovenox  Diet: Regular  Dispo: patient currently homeless, living in shelter,  consult placed for assistance. Pending further medical workup. Imaging is pending. DVT: Lovenox  Diet: Regular  Dispo: Clarified that pt is not homeless. Will f/u dispo plan pending imaging. PT rec rehab for him.

## 2022-09-02 NOTE — CHART NOTE - NSCHARTNOTEFT_GEN_A_CORE
Contacted by primary team in regards to MRI.   Patient will be going for MRI this AM with coordination of anesthesia due to patient's history of claustrophobia.     Will continue to follow on images and final report.     Roger Torres MD PGY3   [id number]

## 2022-09-02 NOTE — PROGRESS NOTE ADULT - SUBJECTIVE AND OBJECTIVE BOX
[facility] Division of Hospital Medicine  Carlton Gradiner) MD Bryon  Pager [phone]    SUBJECTIVE:  Chief complaint: LE weakness.    Pt seen and evaluated at bedside this AM. No o/n events. Denies any SOB/CP/NV. Still w/ LE weakness. Unchanged from prior.       ROS: All systems negative except as noted.      Vital Signs Last 24 Hrs  T(C): 36.5 (02 Sep 2022 10:23), Max: 36.9 (01 Sep 2022 21:55)  T(F): 97.7 (02 Sep 2022 10:23), Max: 98.5 (01 Sep 2022 21:55)  HR: 67 (02 Sep 2022 10:23) (61 - 71)  BP: 115/76 (02 Sep 2022 10:23) (115/64 - 134/81)  BP(mean): --  RR: 18 (02 Sep 2022 10:23) (16 - 20)  SpO2: 99% (02 Sep 2022 10:23) (99% - 100%)    Parameters below as of 02 Sep 2022 10:23  Patient On (Oxygen Delivery Method): room air    PHYSICAL EXAM:  Gen- In bed, comfortable, NAD  Eyes- EOMI, PERRLA, nonicteric.  EMNT- Fair dentition. MMM. No tonsilar exudates. No posterior pharynx erythema.  Neck- Supple. No masses. No tracheal deviation.  Resp- CTAB, good effort. No r/r/w. No accessory muscle use.  CVS- RRR, S1S2, no g/r/m. No LE edema.  GI- Soft abd, NT, ND, +BSx4. No HSM.  MSK- No C/C. ROM intact. No crepitus.  Neuro- CN II-XII intact. Speech fluent/face symmetric. Sensation intact. Strength diminished @ LE - 3+/5.  Skin- No rashes/ulcers. Warm/moist.  Psych- AAOx3. Appropriate mood/affect.      MEDICATION:  MEDICATIONS  (STANDING):  bictegravir 50 mG/emtricitabine 200 mG/tenofovir alafenamide 25 mG (BIKTARVY) 1 Tablet(s) Oral daily  enoxaparin Injectable 40 milliGRAM(s) SubCutaneous every 24 hours  influenza   Vaccine 0.5 milliLiter(s) IntraMuscular once  magnesium sulfate  IVPB 2 Gram(s) IV Intermittent once  pregabalin 50 milliGRAM(s) Oral three times a day  QUEtiapine 100 milliGRAM(s) Oral <User Schedule>  QUEtiapine 200 milliGRAM(s) Oral at bedtime    MEDICATIONS  (PRN):  acetaminophen     Tablet .. 650 milliGRAM(s) Oral every 6 hours PRN Temp greater or equal to 38C (100.4F), Mild Pain (1 - 3)  aluminum hydroxide/magnesium hydroxide/simethicone Suspension 30 milliLiter(s) Oral every 4 hours PRN Dyspepsia  baclofen 10 milliGRAM(s) Oral every 8 hours PRN Muscle Spasm  diphenhydrAMINE 25 milliGRAM(s) Oral every 6 hours PRN Rash and/or Itching  melatonin 3 milliGRAM(s) Oral at bedtime PRN Insomnia  oxyCODONE    IR 10 milliGRAM(s) Oral every 6 hours PRN Severe Pain (7 - 10)  oxyCODONE    IR 5 milliGRAM(s) Oral every 6 hours PRN Moderate Pain (4 - 6)            LABORATORY:                          12.8   3.52  )-----------( 276      ( 02 Sep 2022 06:34 )             38.0     09-02    137  |  105  |  7   ----------------------------<  83  4.0   |  21<L>  |  0.72    Ca    9.1      02 Sep 2022 06:34  Phos  3.0     09-02  Mg     1.50     09-02        SARS-CoV-2: NotDetec (31 Aug 2022 05:46)  COVID-19 PCR: NotDetec (15 Aug 2022 09:10)  COVID-19 PCR: NotDetec (12 Aug 2022 18:30)  SARS-CoV-2: NotDetec (09 Aug 2022 04:13)  COVID-19 PCR: NotDetec (20 May 2022 23:45)

## 2022-09-02 NOTE — PROGRESS NOTE ADULT - PROBLEM SELECTOR PLAN 2
Patient also presenting with 1 day of difficulty with urination  No dysuria/hematuria  Could be 2/2 to HIV myelopathy v. deconditioning  -TOV tonight.

## 2022-09-02 NOTE — CHART NOTE - NSCHARTNOTEFT_GEN_A_CORE
MRI Thoracic and Lumbar:  The visualized thoracic and lumbosacral vertebral bodies are normal in   height and signal intensity. There are no acute fractures or   dislocations. There is no disc herniation or spinal stenosis. There is no   cord compression or abnormal cord signal. The cord terminates normally at   the L1-2 level. The cauda equina is unremarkable. There is no abnormal   parenchymal or leptomeningeal enhancement. The paraspinal soft tissues   are unremarkable.    IMPRESSION: No cord or cauda equina compression. No abnormal cord signal   or enhancement.    Per chart review, was seen by neurology (1/2022 and 9/2021) for similar complaints of b/l LE weakness a/w urinary retention. 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 Patient has had hx of normal LP and normal spine MRI's. From last consult note, prior hx of GBS noted to be INCORRECT (refer to consult notes from 1/2022 and 9/2021).    Impression   LE extremity weakness 2/2 to known myelopathy with fluctuating symptoms for the past 4 years in the setting of HIV   Recommendations:   There is no indication for cervical mri as there were no clinical findings suggesting this.   Will continue with current medical management   Continue Baclofen 20mg TID as noted in chart note on 8/11 and monitor for sedation.   repeat EMG/NCS as an outpatient- one 2021 was negative   PT/OT   psych recommendations  ID recommendations.   rest of management per primary team.     Case discussed with Dr. Pena. MRI Thoracic and Lumbar:  The visualized thoracic and lumbosacral vertebral bodies are normal in   height and signal intensity. There are no acute fractures or   dislocations. There is no disc herniation or spinal stenosis. There is no   cord compression or abnormal cord signal. The cord terminates normally at   the L1-2 level. The cauda equina is unremarkable. There is no abnormal   parenchymal or leptomeningeal enhancement. The paraspinal soft tissues   are unremarkable.    IMPRESSION: No cord or cauda equina compression. No abnormal cord signal   or enhancement.    Per chart review, was seen by neurology (1/2022 and 9/2021) for similar complaints of b/l LE weakness a/w urinary retention. 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 Patient has had hx of normal LP and normal spine MRI's. From last consult note, prior hx of GBS noted to be INCORRECT (refer to consult notes from 1/2022 and 9/2021).    Impression   LE extremity weakness 2/2 to known myelopathy with fluctuating symptoms for the past 4 years in the setting of HIV   Recommendations:   There is no indication for cervical mri as there were no clinical findings suggesting this.   Will continue with current medical management   Continue Baclofen 20mg TID as noted in chart note on 8/11 and monitor for sedation.   repeat EMG/NCS as an outpatient- one 2021 was negative   PT/OT   psych recommendations  ID recommendations.   rest of management per primary team.   No further inpatient neurology workup    Case discussed with Dr. Pena.

## 2022-09-02 NOTE — PROGRESS NOTE ADULT - PROBLEM SELECTOR PLAN 1
-Weakness unchanged. Neuro on board.   -MRI T/L spine in progress - to be done under anesthesia due to claustrophobia.  -Serologies neg.   -Cont to follow up neuro pending imaging.   -Appreciate PT recs - will need BRAYDEN. -Weakness unchanged. Neuro on board.   -MRI T/L spine in progress - to be done under anesthesia due to claustrophobia.  -Serologies neg.   -Cont to follow up neuro pending imaging.   -Appreciate PT recs - will need BRAYDEN.    #Hypokalemia  -Improved.

## 2022-09-03 ENCOUNTER — TRANSCRIPTION ENCOUNTER (OUTPATIENT)
Age: 32
End: 2022-09-03

## 2022-09-03 VITALS
SYSTOLIC BLOOD PRESSURE: 127 MMHG | RESPIRATION RATE: 18 BRPM | DIASTOLIC BLOOD PRESSURE: 80 MMHG | HEART RATE: 64 BPM | TEMPERATURE: 98 F | OXYGEN SATURATION: 98 %

## 2022-09-03 LAB
ANION GAP SERPL CALC-SCNC: 12 MMOL/L — SIGNIFICANT CHANGE UP (ref 7–14)
BASOPHILS # BLD AUTO: 0.02 K/UL — SIGNIFICANT CHANGE UP (ref 0–0.2)
BASOPHILS NFR BLD AUTO: 0.4 % — SIGNIFICANT CHANGE UP (ref 0–2)
BUN SERPL-MCNC: 12 MG/DL — SIGNIFICANT CHANGE UP (ref 7–23)
CALCIUM SERPL-MCNC: 9.2 MG/DL — SIGNIFICANT CHANGE UP (ref 8.4–10.5)
CHLORIDE SERPL-SCNC: 102 MMOL/L — SIGNIFICANT CHANGE UP (ref 98–107)
CO2 SERPL-SCNC: 20 MMOL/L — LOW (ref 22–31)
CREAT SERPL-MCNC: 0.82 MG/DL — SIGNIFICANT CHANGE UP (ref 0.5–1.3)
EGFR: 119 ML/MIN/1.73M2 — SIGNIFICANT CHANGE UP
EOSINOPHIL # BLD AUTO: 0.21 K/UL — SIGNIFICANT CHANGE UP (ref 0–0.5)
EOSINOPHIL NFR BLD AUTO: 4.7 % — SIGNIFICANT CHANGE UP (ref 0–6)
GLUCOSE SERPL-MCNC: 87 MG/DL — SIGNIFICANT CHANGE UP (ref 70–99)
HCT VFR BLD CALC: 39.8 % — SIGNIFICANT CHANGE UP (ref 39–50)
HGB BLD-MCNC: 12.9 G/DL — LOW (ref 13–17)
IANC: 1.2 K/UL — LOW (ref 1.8–7.4)
IMM GRANULOCYTES NFR BLD AUTO: 0.2 % — SIGNIFICANT CHANGE UP (ref 0–1.5)
LYMPHOCYTES # BLD AUTO: 2.63 K/UL — SIGNIFICANT CHANGE UP (ref 1–3.3)
LYMPHOCYTES # BLD AUTO: 59 % — HIGH (ref 13–44)
MAGNESIUM SERPL-MCNC: 1.8 MG/DL — SIGNIFICANT CHANGE UP (ref 1.6–2.6)
MCHC RBC-ENTMCNC: 28.9 PG — SIGNIFICANT CHANGE UP (ref 27–34)
MCHC RBC-ENTMCNC: 32.4 GM/DL — SIGNIFICANT CHANGE UP (ref 32–36)
MCV RBC AUTO: 89.2 FL — SIGNIFICANT CHANGE UP (ref 80–100)
MONOCYTES # BLD AUTO: 0.39 K/UL — SIGNIFICANT CHANGE UP (ref 0–0.9)
MONOCYTES NFR BLD AUTO: 8.7 % — SIGNIFICANT CHANGE UP (ref 2–14)
NEUTROPHILS # BLD AUTO: 1.2 K/UL — LOW (ref 1.8–7.4)
NEUTROPHILS NFR BLD AUTO: 27 % — LOW (ref 43–77)
NRBC # BLD: 0 /100 WBCS — SIGNIFICANT CHANGE UP (ref 0–0)
NRBC # FLD: 0 K/UL — SIGNIFICANT CHANGE UP (ref 0–0)
PHOSPHATE SERPL-MCNC: 3.1 MG/DL — SIGNIFICANT CHANGE UP (ref 2.5–4.5)
PLATELET # BLD AUTO: 273 K/UL — SIGNIFICANT CHANGE UP (ref 150–400)
POTASSIUM SERPL-MCNC: 4.6 MMOL/L — SIGNIFICANT CHANGE UP (ref 3.5–5.3)
POTASSIUM SERPL-SCNC: 4.6 MMOL/L — SIGNIFICANT CHANGE UP (ref 3.5–5.3)
RBC # BLD: 4.46 M/UL — SIGNIFICANT CHANGE UP (ref 4.2–5.8)
RBC # FLD: 13.1 % — SIGNIFICANT CHANGE UP (ref 10.3–14.5)
SODIUM SERPL-SCNC: 134 MMOL/L — LOW (ref 135–145)
WBC # BLD: 4.46 K/UL — SIGNIFICANT CHANGE UP (ref 3.8–10.5)
WBC # FLD AUTO: 4.46 K/UL — SIGNIFICANT CHANGE UP (ref 3.8–10.5)

## 2022-09-03 PROCEDURE — 99239 HOSP IP/OBS DSCHRG MGMT >30: CPT

## 2022-09-03 RX ORDER — QUETIAPINE FUMARATE 200 MG/1
1 TABLET, FILM COATED ORAL
Qty: 30 | Refills: 0
Start: 2022-09-03 | End: 2022-10-02

## 2022-09-03 RX ADMIN — BICTEGRAVIR SODIUM, EMTRICITABINE, AND TENOFOVIR ALAFENAMIDE FUMARATE 1 TABLET(S): 30; 120; 15 TABLET ORAL at 12:09

## 2022-09-03 RX ADMIN — Medication 50 MILLIGRAM(S): at 07:25

## 2022-09-03 RX ADMIN — QUETIAPINE FUMARATE 100 MILLIGRAM(S): 200 TABLET, FILM COATED ORAL at 07:25

## 2022-09-03 RX ADMIN — Medication 50 MILLIGRAM(S): at 14:00

## 2022-09-03 NOTE — DISCHARGE NOTE PROVIDER - PROVIDER TOKENS
PROVIDER:[TOKEN:[[id number]]],FREE:[LAST:[Dr. Brink (your PCP)],PHONE:[(   )    -],FAX:[(   )    -]]

## 2022-09-03 NOTE — PROGRESS NOTE ADULT - PROBLEM SELECTOR PLAN 3
Patient with congenital HIV  Following with ID, Dr. Hunter  CD4 count 335,   -c/w Biktarvy  -Repeat VL in 2 wks.   Appreciate ID recs. Patient with congenital HIV  Following with ID, Dr. Hunter  CD4 count 335,   -c/w Biktarvy  -Repeat VL in 2 wks as outpatient   Appreciate ID recs.

## 2022-09-03 NOTE — DISCHARGE NOTE NURSING/CASE MANAGEMENT/SOCIAL WORK - PATIENT PORTAL LINK FT
You can access the FollowMyHealth Patient Portal offered by [facility] by registering at the following website: [email or web address]. By joining [facility]’s FollowMyHealth portal, you will also be able to view your health information using other applications (apps) compatible with our system.

## 2022-09-03 NOTE — PROGRESS NOTE ADULT - PROBLEM SELECTOR PLAN 2
Patient also presenting with 1 day of difficulty with urination  No dysuria/hematuria  Could be 2/2 to HIV myelopathy v. deconditioning  -TOV tonight. - passed TOV   - Patient also presenting with 1 day of difficulty with urination  - No dysuria/hematuria  - Could be 2/2 to HIV myelopathy v. deconditioning

## 2022-09-03 NOTE — DISCHARGE NOTE PROVIDER - HOSPITAL COURSE
33 y/o M with congenital HIV, chronic back pain, HIV myelopathy and asthma presenting with fluctuating LE weakness and urinary retention.    Lower extremity weakness.    - MRI T/L spine: no cord/cauda equina compression; no abnl cord signal or enhancement  - Serologies neg.   - followed by ID, do not suspect 2/2 HIV myelopathy as is considered end stage disease of HIV, plus patient CD4 count and VL in good range   - followed by neurology   - PT recommended BRAYDEN     Hypokalemia  - Improved.    Urinary retention.   - pt p/w 1 day of difficulty with urination  - No dysuria/hematuria  - Could be 2/2 to HIV myelopathy v. deconditioning  - Brizuela placed 8/31, passed TOV on 9/3     HIV disease.   - Patient with congenital HIV  - Following with ID, Dr. Hunter  - CD4 count 335,   - c/w Biktarvy  - Repeat VL in 2 wks.   - followed by ID during admission     Night terrors.   - reported history of night terrors as well as possible PTSD  - denied SI/HI   - Seen by  during previous hospitalization  - c/w home seroquel 200mg qHS and seroquel 100mg qAM.    Dispo - [facility]   Patient is medically optimized for discharge. Case discussed with _______ on ___________. 31 y/o M with congenital HIV, chronic back pain, HIV myelopathy and asthma presenting with fluctuating LE weakness and urinary retention.    Lower extremity weakness.    - MRI T/L spine: no cord/cauda equina compression; no abnl cord signal or enhancement  - Serologies neg.   - followed by ID, do not suspect 2/2 HIV myelopathy as is considered end stage disease of HIV, plus patient CD4 count and VL in good range   - followed by neurology   - PT recommended BRAYDEN     Hypokalemia  - Improved.    Urinary retention.   - pt p/w 1 day of difficulty with urination  - No dysuria/hematuria  - Could be 2/2 to HIV myelopathy v. deconditioning  - Brizuela placed 8/31, passed TOV on 9/3     HIV disease.   - Patient with congenital HIV  - Following with ID, Dr. Hunter  - CD4 count 335,   - c/w Biktarvy  - Repeat VL in 2 wks.   - followed by ID during admission     Night terrors.   - reported history of night terrors as well as possible PTSD  - denied SI/HI   - Seen by  during previous hospitalization  - c/w home seroquel 200mg qHS and seroquel 100mg qAM.    Dispo - patient declined [facility]. Will provide script for outpatient physical therapy.   Patient is medically optimized for discharge. Case discussed with Dr Olivares on 9/3/22. 33 y/o M with congenital HIV, chronic back pain, HIV myelopathy and asthma presenting with fluctuating LE weakness and urinary retention.    Lower extremity weakness.    - MRI T/L spine: no cord/cauda equina compression; no abnl cord signal or enhancement  - Serologies neg.   - followed by ID, do not suspect 2/2 HIV myelopathy as is considered end stage disease of HIV, plus patient CD4 count and VL in good range   - followed by neurology   - PT recommended BRAYDEN, patient decline wants outpatient PT  - MRI negative for acute pathologies    Hypokalemia  - Improved.    Urinary retention.   - pt p/w 1 day of difficulty with urination  - No dysuria/hematuria  - Could be 2/2 to HIV myelopathy v. deconditioning  - MRI negative for cord compression or cauda equina  - Brizuela placed 8/31, passed TOV on 9/3     HIV disease.   - Patient with congenital HIV  - Following with ID, Dr. Hunter  - CD4 count 335,   - c/w Biktarvy  - Repeat VL in 2 wks to followup with ID as outpatient   - followed by ID during admission     Night terrors.   - reported history of night terrors as well as possible PTSD  - denied SI/HI   - Seen by  during previous hospitalization  - c/w home seroquel 200mg qHS and seroquel 100mg qAM.    Dispo - patient declined [facility]. Will provide script for outpatient physical therapy.   Patient is medically optimized for discharge. Case discussed with Dr Olivares on 9/3/22.

## 2022-09-03 NOTE — DISCHARGE NOTE NURSING/CASE MANAGEMENT/SOCIAL WORK - NSDCPEFALRISK_GEN_ALL_CORE
For information on Fall & Injury Prevention, visit: [email or web address] OR  [email or web address] OR  https://www.cdc.gov/steadi/patient.html

## 2022-09-03 NOTE — PROGRESS NOTE ADULT - PROBLEM SELECTOR PLAN 5
DVT: Lovenox  Diet: Regular  Dispo: Clarified that pt is not homeless. Will f/u dispo plan pending imaging. PT rec rehab for him. DVT: Lovenox  Diet: Regular  Dispo: Clarified that pt is not homeless. Will f/u dispo plan pending imaging. PT rec rehab for him patient refusing [facility]RAYDEN

## 2022-09-03 NOTE — PROGRESS NOTE ADULT - SUBJECTIVE AND OBJECTIVE BOX
[facility] Division of Hospital Medicine  Sigrid Olivares M.D  Pager [phone]  Available via MS Teams    SUBJECTIVE / OVERNIGHT EVENTS:    ADDITIONAL REVIEW OF SYSTEMS:    MEDICATIONS  (STANDING):  bictegravir 50 mG/emtricitabine 200 mG/tenofovir alafenamide 25 mG (BIKTARVY) 1 Tablet(s) Oral daily  enoxaparin Injectable 40 milliGRAM(s) SubCutaneous every 24 hours  influenza   Vaccine 0.5 milliLiter(s) IntraMuscular once  pregabalin 50 milliGRAM(s) Oral three times a day  QUEtiapine 100 milliGRAM(s) Oral <User Schedule>  QUEtiapine 200 milliGRAM(s) Oral at bedtime    MEDICATIONS  (PRN):  acetaminophen     Tablet .. 650 milliGRAM(s) Oral every 6 hours PRN Temp greater or equal to 38C (100.4F), Mild Pain (1 - 3)  aluminum hydroxide/magnesium hydroxide/simethicone Suspension 30 milliLiter(s) Oral every 4 hours PRN Dyspepsia  baclofen 10 milliGRAM(s) Oral every 8 hours PRN Muscle Spasm  diphenhydrAMINE 25 milliGRAM(s) Oral every 6 hours PRN Rash and/or Itching  melatonin 3 milliGRAM(s) Oral at bedtime PRN Insomnia  oxyCODONE    IR 10 milliGRAM(s) Oral every 6 hours PRN Severe Pain (7 - 10)  oxyCODONE    IR 5 milliGRAM(s) Oral every 6 hours PRN Moderate Pain (4 - 6)      I&O's Summary    02 Sep 2022 07:01  -  03 Sep 2022 07:00  --------------------------------------------------------  IN: 0 mL / OUT: 2050 mL / NET: -2050 mL        PHYSICAL EXAM:  Vital Signs Last 24 Hrs  T(C): 36.6 (03 Sep 2022 05:26), Max: 36.9 (02 Sep 2022 17:17)  T(F): 97.9 (03 Sep 2022 05:26), Max: 98.5 (02 Sep 2022 17:17)  HR: 69 (03 Sep 2022 05:26) (66 - 73)  BP: 113/78 (03 Sep 2022 05:26) (113/78 - 136/82)  BP(mean): --  RR: 18 (03 Sep 2022 05:26) (18 - 18)  SpO2: 100% (03 Sep 2022 05:26) (99% - 100%)    Parameters below as of 03 Sep 2022 05:26  Patient On (Oxygen Delivery Method): room air      CONSTITUTIONAL: NAD, well-developed, well-groomed  EYES: PERRLA; conjunctiva and sclera clear  ENMT: Moist oral mucosa, no pharyngeal injection or exudates; normal dentition  NECK: Supple, no palpable masses; no thyromegaly  RESPIRATORY: Normal respiratory effort; lungs are clear to auscultation bilaterally  CARDIOVASCULAR: Regular rate and rhythm, normal S1 and S2, no murmur/rub/gallop; No lower extremity edema; Peripheral pulses are 2+ bilaterally  ABDOMEN: Nontender to palpation, normoactive bowel sounds, no rebound/guarding; No hepatosplenomegaly  MUSCULOSKELETAL:  Normal gait; no clubbing or cyanosis of digits; no joint swelling or tenderness to palpation  PSYCH: A+O to person, place, and time; affect appropriate  NEUROLOGY: CN 2-12 are intact and symmetric; no gross sensory deficits   SKIN: No rashes; no palpable lesions    LABS:                        12.9   4.46  )-----------( 273      ( 03 Sep 2022 06:30 )             39.8     09-03    134<L>  |  102  |  12  ----------------------------<  87  4.6   |  20<L>  |  0.82    Ca    9.2      03 Sep 2022 06:30  Phos  3.1     09-03  Mg     1.80     09-03                SARS-CoV-2: NotDetec (31 Aug 2022 05:46)  COVID-19 PCR: NotDetec (15 Aug 2022 09:10)  COVID-19 PCR: NotDetec (12 Aug 2022 18:30)  SARS-CoV-2: NotDetec (09 Aug 2022 04:13)  COVID-19 PCR: NotDetec (20 May 2022 23:45)      RADIOLOGY & ADDITIONAL TESTS:  New Results Reviewed Today:  New Imaging Personally Reviewed Today: MRI results reviewed personally by me. No acute changes or signs of compression   New Electrocardiogram Personally Reviewed Today:      COMMUNICATION:  Care Discussed with Consultants/Other Providers and Details of Discussion:       [facility] Division of Hospital Medicine  Sigrid Petersenleslie RENE  Pager [phone]  Available via MS Teams    SUBJECTIVE / OVERNIGHT EVENTS: No acute events. laying in bed comfortable states that his numbness is now reolved.     ADDITIONAL REVIEW OF SYSTEMS:    MEDICATIONS  (STANDING):  bictegravir 50 mG/emtricitabine 200 mG/tenofovir alafenamide 25 mG (BIKTARVY) 1 Tablet(s) Oral daily  enoxaparin Injectable 40 milliGRAM(s) SubCutaneous every 24 hours  influenza   Vaccine 0.5 milliLiter(s) IntraMuscular once  pregabalin 50 milliGRAM(s) Oral three times a day  QUEtiapine 100 milliGRAM(s) Oral <User Schedule>  QUEtiapine 200 milliGRAM(s) Oral at bedtime    MEDICATIONS  (PRN):  acetaminophen     Tablet .. 650 milliGRAM(s) Oral every 6 hours PRN Temp greater or equal to 38C (100.4F), Mild Pain (1 - 3)  aluminum hydroxide/magnesium hydroxide/simethicone Suspension 30 milliLiter(s) Oral every 4 hours PRN Dyspepsia  baclofen 10 milliGRAM(s) Oral every 8 hours PRN Muscle Spasm  diphenhydrAMINE 25 milliGRAM(s) Oral every 6 hours PRN Rash and/or Itching  melatonin 3 milliGRAM(s) Oral at bedtime PRN Insomnia  oxyCODONE    IR 10 milliGRAM(s) Oral every 6 hours PRN Severe Pain (7 - 10)  oxyCODONE    IR 5 milliGRAM(s) Oral every 6 hours PRN Moderate Pain (4 - 6)      I&O's Summary    02 Sep 2022 07:01  -  03 Sep 2022 07:00  --------------------------------------------------------  IN: 0 mL / OUT: 2050 mL / NET: -2050 mL        PHYSICAL EXAM:  Vital Signs Last 24 Hrs  T(C): 36.6 (03 Sep 2022 05:26), Max: 36.9 (02 Sep 2022 17:17)  T(F): 97.9 (03 Sep 2022 05:26), Max: 98.5 (02 Sep 2022 17:17)  HR: 69 (03 Sep 2022 05:26) (66 - 73)  BP: 113/78 (03 Sep 2022 05:26) (113/78 - 136/82)  BP(mean): --  RR: 18 (03 Sep 2022 05:26) (18 - 18)  SpO2: 100% (03 Sep 2022 05:26) (99% - 100%)    Parameters below as of 03 Sep 2022 05:26  Patient On (Oxygen Delivery Method): room air      CONSTITUTIONAL: NAD, well-developed, well-groomed  EYES: PERRLA; conjunctiva and sclera clear  ENMT: Moist oral mucosa, no pharyngeal injection or exudates; normal dentition  NECK: Supple, no palpable masses; no thyromegaly  RESPIRATORY: Normal respiratory effort; lungs are clear to auscultation bilaterally  CARDIOVASCULAR: Regular rate and rhythm, normal S1 and S2, no murmur/rub/gallop; No lower extremity edema; Peripheral pulses are 2+ bilaterally  ABDOMEN: Nontender to palpation, normoactive bowel sounds, no rebound/guarding; No hepatosplenomegaly  MUSCULOSKELETAL:  Normal gait; no clubbing or cyanosis of digits; no joint swelling or tenderness to palpation  PSYCH: A+O to person, place, and time; affect appropriate  NEUROLOGY: CN 2-12 are intact and symmetric; no gross sensory deficits   SKIN: No rashes; no palpable lesions    LABS:                        12.9   4.46  )-----------( 273      ( 03 Sep 2022 06:30 )             39.8     09-03    134<L>  |  102  |  12  ----------------------------<  87  4.6   |  20<L>  |  0.82    Ca    9.2      03 Sep 2022 06:30  Phos  3.1     09-03  Mg     1.80     09-03                SARS-CoV-2: NotDetec (31 Aug 2022 05:46)  COVID-19 PCR: NotDetec (15 Aug 2022 09:10)  COVID-19 PCR: NotDetec (12 Aug 2022 18:30)  SARS-CoV-2: NotDetec (09 Aug 2022 04:13)  COVID-19 PCR: NotDetec (20 May 2022 23:45)      RADIOLOGY & ADDITIONAL TESTS:  New Imaging Personally Reviewed Today: MRI results reviewed personally by me. No acute changes or signs of compression or cauda equina

## 2022-09-03 NOTE — DISCHARGE NOTE PROVIDER - ATTENDING DISCHARGE PHYSICAL EXAMINATION:
Patient seen as examined by me. Able to ambulate and states weakness has improved. MRI is negative fro any acute pathologies (cord compression or cauda quina syndrome). PT rec [facility], patient declined services and opted for outpatient PT. Passed TOV and urinating own his own. Stable for discharge on 9/3/2022

## 2022-09-03 NOTE — DISCHARGE NOTE PROVIDER - NSDCMRMEDTOKEN_GEN_ALL_CORE_FT
baclofen 10 mg oral tablet: 2 tab(s) orally 3 times a day MDD:6 tabs  bictegravir/emtricitabine/tenofovir 50 mg-200 mg-25 mg oral tablet: 1 tab(s) orally once a day MDD:1  magnesium oxide 400 mg oral tablet: 1 tab(s) orally once a day MDD:1  melatonin 5 mg oral tablet: 1 tab(s) orally once a day (at bedtime)  pregabalin 50 mg oral capsule: 1 cap(s) orally 3 times a day MDD:3  SEROquel 200 mg oral tablet: 1 tab(s) orally once a day (at bedtime) MDD:1   baclofen 10 mg oral tablet: 2 tab(s) orally 3 times a day MDD:6 tabs  bictegravir/emtricitabine/tenofovir 50 mg-200 mg-25 mg oral tablet: 1 tab(s) orally once a day MDD:1  magnesium oxide 400 mg oral tablet: 1 tab(s) orally once a day MDD:1  melatonin 5 mg oral tablet: 1 tab(s) orally once a day (at bedtime)  pregabalin 50 mg oral capsule: 1 cap(s) orally 3 times a day MDD:3  QUEtiapine 100 mg oral tablet: 1 tab(s) orally once a day at 8:00am  SEROquel 200 mg oral tablet: 1 tab(s) orally once a day (at bedtime) MDD:1   baclofen 10 mg oral tablet: 2 tab(s) orally 3 times a day MDD:6 tabs  bictegravir/emtricitabine/tenofovir 50 mg-200 mg-25 mg oral tablet: 1 tab(s) orally once a day MDD:1  magnesium oxide 400 mg oral tablet: 1 tab(s) orally once a day MDD:1  melatonin 5 mg oral tablet: 1 tab(s) orally once a day (at bedtime)  Outpatient physical therapy services. :   pregabalin 50 mg oral capsule: 1 cap(s) orally 3 times a day MDD:3  QUEtiapine 100 mg oral tablet: 1 tab(s) orally once a day at 8:00am  SEROquel 200 mg oral tablet: 1 tab(s) orally once a day (at bedtime) MDD:1

## 2022-09-03 NOTE — DISCHARGE NOTE PROVIDER - NSDCCPCAREPLAN_GEN_ALL_CORE_FT
PRINCIPAL DISCHARGE DIAGNOSIS  Diagnosis: Weakness  Assessment and Plan of Treatment: You were admitted for evaluation and management of lower extremity weakness, along with urinary retention.  You had an MRI of your thoracic/ lumbar spine that was negative for any cord compression or abnormalities to the spinal cord. Physical therapy saw you during this admission and recommended subacute rehab.   Follow up with your primary care doctor and infectious disease doctor upon discharge.      SECONDARY DISCHARGE DIAGNOSES  Diagnosis: Urinary retention  Assessment and Plan of Treatment: Upon admission you had complaints of difficulty with urination. You had a gonzales placed temporarily and it was removed. Follow up outpatient with your PCP. If you have any complaints of urinary hesitancy, difficulty urinating, burning/pain while urinating, blood in urine, see a medical provider.    Diagnosis: HIV disease  Assessment and Plan of Treatment: You were followed by infectious disease during your admission. Continue your Biktarvy medication as directed. Follow up outpatient with your infectious disease doctor/primary care doctor. Have a repeat viral load/CD4 level drawn again 2 weeks upon discharge.    Diagnosis: Night terrors  Assessment and Plan of Treatment: Continue with your seroquel medication as directed and follow up with your primary care provider/psychiatrist for further management.   If you are ever in need of immediate psychiatric assistance you may reach out to the [facility] at [facility] ([phone] [place] [address]) # [phone] operates M-F 9am-3pm, walk in or call for same-day/next-day appointment.    Diagnosis: Hypokalemia  Assessment and Plan of Treatment: Your potassium levels were low during your admission and this was repleted. Your levels returned back to normal limits. Please follow up with your primary care doctor further management.     PRINCIPAL DISCHARGE DIAGNOSIS  Diagnosis: Weakness  Assessment and Plan of Treatment: You were admitted for evaluation and management of lower extremity weakness, along with urinary retention.  You had an MRI of your thoracic/ lumbar spine that was negative for any cord compression or abnormalities to the spinal cord. Physical therapy saw you during this admission and recommended subacute rehab. You declined this and will be provided a script for outpatient physical therapy services.   Follow up with your primary care doctor and infectious disease doctor upon discharge.      SECONDARY DISCHARGE DIAGNOSES  Diagnosis: Urinary retention  Assessment and Plan of Treatment: Upon admission you had complaints of difficulty with urination. You had a gonzales placed temporarily and it was removed. Follow up outpatient with your PCP. If you have any complaints of urinary hesitancy, difficulty urinating, burning/pain while urinating, blood in urine, see a medical provider.    Diagnosis: HIV disease  Assessment and Plan of Treatment: You were followed by infectious disease during your admission. Continue your Biktarvy medication as directed. Follow up outpatient with your infectious disease doctor/primary care doctor. Have a repeat viral load/CD4 level drawn again 2 weeks upon discharge.    Diagnosis: Night terrors  Assessment and Plan of Treatment: Continue with your seroquel medication as directed and follow up with your primary care provider/psychiatrist for further management.   If you are ever in need of immediate psychiatric assistance you may reach out to the [facility] at [facility] ([address] [place] [address]) # [phone] operates M-F 9am-3pm, walk in or call for same-day/next-day appointment.    Diagnosis: Hypokalemia  Assessment and Plan of Treatment: Your potassium levels were low during your admission and this was repleted. Your levels returned back to normal limits. Please follow up with your primary care doctor further management.

## 2022-09-03 NOTE — DISCHARGE NOTE PROVIDER - CARE PROVIDER_API CALL
Easton Hunter)  Internal Medicine  [address] [place] [address]  Phone: [phone]  Fax: [phone]  Follow Up Time:     Dr. Brink (your PCP),   Phone: (   )    -  Fax: (   )    -  Follow Up Time:

## 2022-09-03 NOTE — PROGRESS NOTE ADULT - ASSESSMENT
August 26, 2020        Goran CALVO Scott      To: [facility]    Re: Goran Chavez (Date of birth 1956)    Wound Care Order:    · Remove old dressings.  · Cleanse right and left leg wounds with soap and water/normal saline and gauze. Pat dry.  · Apply zinc barrier cream thickened with stoma powder to sandrine-wound skin.  · For LLE: DC Hydrofera Blue Ready; cover wounds with hydrofiber Ag, then wrap viscopaste unna boots; cover wound areas with super absorbent foam dressings in between unna boot and coban layers.   · For RLE: DC hydrofera blue. Apply Unna Boot  ·  Ensure that top of Unna Boots are no more than two inches below the bend of the knee.    · Home Health to see patient twice per week for dressing changes.            ______________________________________  KEV Mead                                  
  32 year old with congential HIV/AIDS, with recurrent incontinence and lower extremity weakness, presents with incontinence and worsening weakness over the last month.       1) Lower extremity weakness with incontinence    He has had similar prior episodes.     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  Prior CD4 counts have been higher- HIV pyelopathy often seen with more end stage disease.  In addition, he has had prior improvement in symptoms that has been more rapid than I would expet with HIV myelopathy    Consider repeat imaging    Regardless, with HIV myelopathy the treatment is management of HIV.     Neurology following    2) HIV  Continue Biktarvy  Viral load in 800s-repeat in 2 weeks  gennosure sent on 8/11- testing    Follow up as outpt with HIV
  32 year old with congential HIV/AIDS, with recurrent incontinence and lower extremity weakness, presents with incontinence and worsening weakness over the last month.       1) Lower extremity weakness with incontinence    He has had similar prior episodes.     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  Prior CD4 counts have been higher- HIV pyelopathy often seen with more end stage disease.  In addition, he has had prior improvement in symptoms that has been more rapid than I would expet with HIV myelopathy    Repeat MRI without pathology    Regardless, with HIV myelopathy the treatment is management of HIV.     Further eval per neurology     2) HIV  Continue Biktarvy  Viral load in 800s-repeat in 2 weeks  gennosure sent on 8/11- testing    Follow up as outpt with Dr Hunter [phone]    Call ID service with additional questions
Patient LS is a 32 year old man with congenital HIV, chronic back pain, substance use, HIV myelopathy, asthma who is here for fluctuations in worsening lower extremity weakness and urinary retention. Of note was seen by neurology here in 8/9/22 for 3 days of LE weakness of toes to hips, 2-3 days bowel/bladder incontinence, lower back pain, muscle spasms in lumbar back. Did not have saddle anesthesia then or now. Takes baclofen, gabapentin, lyrica. Has been seen multiple times in past by neurology for similar complaints of b/l LE weakness. Prior workup was not c/w GBS but potentially myelopathy 2/2 HIV infection. Has hx of normal LP and normal spine MRI's. MRI lumbar spines (6/5/21, 5/21/21, 5/2/21, 10/26/20). MRI cervical spine (5/2/21), MRI thoracic spine (5/2/21, 10/26/20), MRI head (3/25/20). HIE notes from other systems report EMG/NCS in 2021 as being normal.     Impression: Concern for progression of HIV myelopathy r/o cord compression.   Difficult to evaluate for spastic paraparesis given pain limitation although has urinary symptom involvement.    Recommendations:   [ ] MRI lumbar and thoracic spine w/ w/o con to evaluate for interval changes from prior imaging- urgent   [x] In Aug 9/10: B6 9.3, folate 8.1, a1c 4.6, zinc 86, copper 113  [ ] TSH, ACE level, syphilis screen, ANCA, lyme screen, HIV quantifcation studies (cd4 count, viral load), Vit E   [x] ID evaluation and following   [x] bladder scan, UA, per primary team if should consider urology evaluation  [x] PT/OT  [ ] repeat EMG/NCS in outpatient setting    Discussed with neurology attending
32 year old with hx of HIV, HIV myelopathy, asthma, afib presenting with acute on chronic lower extremity weakness of 2 day durations associated with urinary retention. Presentation concerning for worsening HIV myelopathy. Patient currently admitted for further workup and management.

## 2022-09-03 NOTE — PROGRESS NOTE ADULT - PROBLEM SELECTOR PLAN 1
-Weakness unchanged. Neuro on board.   -MRI T/L spine done under anesthesia completed, per read "No cord or cauda equina compression. No abnormal cord signal or enhancement"  - will touchbase with neuro   - ID following do not suspect 2/2 to HIV myelopathy as is considered end stage disease of HIV, plus patient CD4 count and VL in good range  -Serologies neg.   -Cont to follow up neuro pending imaging.   -Appreciate PT recs - recommending [facility]     #Hypokalemia  -Improved. -Weakness unchanged. Neuro on board.   -MRI T/L spine done under anesthesia completed, per read "No cord or cauda equina compression. No abnormal cord signal or enhancement  - given no sings of compression, patient able to ambulate can likely d/c home   - patient refusing [facility], wants outpatient PT   - ID following do not suspect 2/2 to HIV myelopathy as is considered end stage disease of HIV, plus patient CD4 count and VL in good range;   - Cont to follow up neuro pending imaging.   - Appreciate PT recs - recommending [facility]     #Hypokalemia  -Improved.

## 2022-09-03 NOTE — DISCHARGE NOTE NURSING/CASE MANAGEMENT/SOCIAL WORK - NSDCVIVACCINE_GEN_ALL_CORE_FT
Tdap; 09-Jul-2022 05:12; Annabelle Curiel (RN); [employer]; [id number] (Exp. Date: 11-Mar-2024); IntraMuscular; Deltoid Right.; 0.5 milliLiter(s); VIS (VIS Published: 09-May-2013, VIS Presented: 09-Jul-2022);

## 2022-09-06 LAB
A-TOCOPHEROL VIT E SERPL-MCNC: 9.4 MG/L — SIGNIFICANT CHANGE UP (ref 5.9–19.4)
BETA+GAMMA TOCOPHEROL SERPL-MCNC: 0.8 MG/L — SIGNIFICANT CHANGE UP (ref 0.7–4.9)
HIV 1 RNA IN SERPLBLD-SEQ: SIGNIFICANT CHANGE UP
HIV GENOSURE ARCHIVE 1: SIGNIFICANT CHANGE UP
HIV GENOSURE ARCHIVE 2: SIGNIFICANT CHANGE UP
HIV GENOSURE ARCHIVE INTERP: SIGNIFICANT CHANGE UP

## 2022-09-11 VITALS
HEART RATE: 82 BPM | HEIGHT: 72 IN | OXYGEN SATURATION: 98 % | TEMPERATURE: 98 F | WEIGHT: 205.03 LBS | SYSTOLIC BLOOD PRESSURE: 130 MMHG | RESPIRATION RATE: 15 BRPM | DIASTOLIC BLOOD PRESSURE: 86 MMHG

## 2022-09-11 LAB
ALBUMIN SERPL ELPH-MCNC: 3.8 G/DL — SIGNIFICANT CHANGE UP (ref 3.4–5)
ALP SERPL-CCNC: 79 U/L — SIGNIFICANT CHANGE UP (ref 40–120)
ALT FLD-CCNC: 13 U/L — SIGNIFICANT CHANGE UP (ref 12–42)
ANION GAP SERPL CALC-SCNC: 10 MMOL/L — SIGNIFICANT CHANGE UP (ref 9–16)
APTT BLD: 30.5 SEC — SIGNIFICANT CHANGE UP (ref 27.5–35.5)
AST SERPL-CCNC: 22 U/L — SIGNIFICANT CHANGE UP (ref 15–37)
BASOPHILS # BLD AUTO: 0.02 K/UL — SIGNIFICANT CHANGE UP (ref 0–0.2)
BASOPHILS NFR BLD AUTO: 0.5 % — SIGNIFICANT CHANGE UP (ref 0–2)
BILIRUB SERPL-MCNC: 0.4 MG/DL — SIGNIFICANT CHANGE UP (ref 0.2–1.2)
BUN SERPL-MCNC: 8 MG/DL — SIGNIFICANT CHANGE UP (ref 7–23)
CALCIUM SERPL-MCNC: 8.6 MG/DL — SIGNIFICANT CHANGE UP (ref 8.5–10.5)
CHLORIDE SERPL-SCNC: 104 MMOL/L — SIGNIFICANT CHANGE UP (ref 96–108)
CO2 SERPL-SCNC: 26 MMOL/L — SIGNIFICANT CHANGE UP (ref 22–31)
CREAT SERPL-MCNC: 0.95 MG/DL — SIGNIFICANT CHANGE UP (ref 0.5–1.3)
EGFR: 108 ML/MIN/1.73M2 — SIGNIFICANT CHANGE UP
EOSINOPHIL # BLD AUTO: 0.17 K/UL — SIGNIFICANT CHANGE UP (ref 0–0.5)
EOSINOPHIL NFR BLD AUTO: 4 % — SIGNIFICANT CHANGE UP (ref 0–6)
GLUCOSE SERPL-MCNC: 71 MG/DL — SIGNIFICANT CHANGE UP (ref 70–99)
HCT VFR BLD CALC: 39.6 % — SIGNIFICANT CHANGE UP (ref 39–50)
HGB BLD-MCNC: 12.6 G/DL — LOW (ref 13–17)
IMM GRANULOCYTES NFR BLD AUTO: 0.2 % — SIGNIFICANT CHANGE UP (ref 0–1.5)
INR BLD: 1.1 — SIGNIFICANT CHANGE UP (ref 0.88–1.16)
LACTATE SERPL-SCNC: 0.7 MMOL/L — SIGNIFICANT CHANGE UP (ref 0.4–2)
LYMPHOCYTES # BLD AUTO: 2.35 K/UL — SIGNIFICANT CHANGE UP (ref 1–3.3)
LYMPHOCYTES # BLD AUTO: 54.8 % — HIGH (ref 13–44)
MCHC RBC-ENTMCNC: 29.2 PG — SIGNIFICANT CHANGE UP (ref 27–34)
MCHC RBC-ENTMCNC: 31.8 GM/DL — LOW (ref 32–36)
MCV RBC AUTO: 91.9 FL — SIGNIFICANT CHANGE UP (ref 80–100)
MONOCYTES # BLD AUTO: 0.35 K/UL — SIGNIFICANT CHANGE UP (ref 0–0.9)
MONOCYTES NFR BLD AUTO: 8.2 % — SIGNIFICANT CHANGE UP (ref 2–14)
NEUTROPHILS # BLD AUTO: 1.39 K/UL — LOW (ref 1.8–7.4)
NEUTROPHILS NFR BLD AUTO: 32.3 % — LOW (ref 43–77)
NRBC # BLD: 0 /100 WBCS — SIGNIFICANT CHANGE UP (ref 0–0)
PLATELET # BLD AUTO: 261 K/UL — SIGNIFICANT CHANGE UP (ref 150–400)
POTASSIUM SERPL-MCNC: 3.2 MMOL/L — LOW (ref 3.5–5.3)
POTASSIUM SERPL-SCNC: 3.2 MMOL/L — LOW (ref 3.5–5.3)
PROT SERPL-MCNC: 7.7 G/DL — SIGNIFICANT CHANGE UP (ref 6.4–8.2)
PROTHROM AB SERPL-ACNC: 12.8 SEC — SIGNIFICANT CHANGE UP (ref 10.5–13.4)
RBC # BLD: 4.31 M/UL — SIGNIFICANT CHANGE UP (ref 4.2–5.8)
RBC # FLD: 13.4 % — SIGNIFICANT CHANGE UP (ref 10.3–14.5)
SODIUM SERPL-SCNC: 140 MMOL/L — SIGNIFICANT CHANGE UP (ref 132–145)
WBC # BLD: 4.29 K/UL — SIGNIFICANT CHANGE UP (ref 3.8–10.5)
WBC # FLD AUTO: 4.29 K/UL — SIGNIFICANT CHANGE UP (ref 3.8–10.5)

## 2022-09-11 PROCEDURE — 99285 EMERGENCY DEPT VISIT HI MDM: CPT

## 2022-09-11 RX ORDER — MORPHINE SULFATE 50 MG/1
4 CAPSULE, EXTENDED RELEASE ORAL ONCE
Refills: 0 | Status: DISCONTINUED | OUTPATIENT
Start: 2022-09-11 | End: 2022-09-11

## 2022-09-11 RX ORDER — BACLOFEN 100 %
10 POWDER (GRAM) MISCELLANEOUS ONCE
Refills: 0 | Status: COMPLETED | OUTPATIENT
Start: 2022-09-11 | End: 2022-09-11

## 2022-09-11 RX ORDER — SODIUM CHLORIDE 9 MG/ML
1000 INJECTION INTRAMUSCULAR; INTRAVENOUS; SUBCUTANEOUS ONCE
Refills: 0 | Status: COMPLETED | OUTPATIENT
Start: 2022-09-11 | End: 2022-09-11

## 2022-09-11 RX ORDER — KETOROLAC TROMETHAMINE 30 MG/ML
15 SYRINGE (ML) INJECTION ONCE
Refills: 0 | Status: DISCONTINUED | OUTPATIENT
Start: 2022-09-11 | End: 2022-09-11

## 2022-09-11 RX ORDER — GABAPENTIN 400 MG/1
300 CAPSULE ORAL ONCE
Refills: 0 | Status: COMPLETED | OUTPATIENT
Start: 2022-09-11 | End: 2022-09-11

## 2022-09-11 RX ADMIN — SODIUM CHLORIDE 1000 MILLILITER(S): 9 INJECTION INTRAMUSCULAR; INTRAVENOUS; SUBCUTANEOUS at 23:44

## 2022-09-11 RX ADMIN — Medication 10 MILLIGRAM(S): at 23:41

## 2022-09-11 RX ADMIN — GABAPENTIN 300 MILLIGRAM(S): 400 CAPSULE ORAL at 23:41

## 2022-09-11 RX ADMIN — MORPHINE SULFATE 4 MILLIGRAM(S): 50 CAPSULE, EXTENDED RELEASE ORAL at 23:52

## 2022-09-11 NOTE — ED ADULT NURSE NOTE - NSIMPLEMENTINTERV_GEN_ALL_ED
Implemented All Fall Risk Interventions:  [place] to call system. Call bell, personal items and telephone within reach. Instruct patient to call for assistance. Room bathroom lighting operational. Non-slip footwear when patient is off stretcher. Physically safe environment: no spills, clutter or unnecessary equipment. Stretcher in lowest position, wheels locked, appropriate side rails in place. Provide visual cue, wrist band, yellow gown, etc. Monitor gait and stability. Monitor for mental status changes and reorient to person, place, and time. Review medications for side effects contributing to fall risk. Reinforce activity limits and safety measures with patient and family.

## 2022-09-11 NOTE — ED ADULT NURSE NOTE - OBJECTIVE STATEMENT
as above, lives in a shelter since 2018 post GB, has never had follow up, last week was in an er for urine retention, not follow up , pt states last 2 days has been too weak to get out of bed so urinated and bowel open on himself however pt is clean . Dr ruiz now performing his assessment

## 2022-09-11 NOTE — ED ADULT TRIAGE NOTE - CHIEF COMPLAINT QUOTE
Pt brought in by EMS stating "my nerves are bothering me." Pt reports history of congenital HIV and Guillain-Barré in 2017 with intermittent flares of weakness. States  he has been having difficulty walking due to weakness in his legs.

## 2022-09-11 NOTE — ED ADULT NURSE NOTE - NS ED NURSE PATIENT LEFT UNIT TIME
04:18 Keystone Flap Text: The defect edges were debeveled with a #15 scalpel blade.  Given the location of the defect, shape of the defect a keystone flap was deemed most appropriate.  Using a sterile surgical marker, an appropriate keystone flap was drawn incorporating the defect, outlining the appropriate donor tissue and placing the expected incisions within the relaxed skin tension lines where possible. The area thus outlined was incised deep to adipose tissue with a #15 scalpel blade.  The skin margins were undermined to an appropriate distance in all directions around the primary defect and laterally outward around the flap utilizing iris scissors.

## 2022-09-12 ENCOUNTER — INPATIENT (INPATIENT)
Facility: HOSPITAL | Age: 32
LOS: 2 days | Discharge: LEFT BEFORE DISCHARGE | DRG: 977 | End: 2022-09-15
Attending: PSYCHIATRY & NEUROLOGY | Admitting: INTERNAL MEDICINE
Payer: MEDICAID

## 2022-09-12 DIAGNOSIS — Z98.890 OTHER SPECIFIED POSTPROCEDURAL STATES: Chronic | ICD-10-CM

## 2022-09-12 DIAGNOSIS — F51.4 SLEEP TERRORS [NIGHT TERRORS]: ICD-10-CM

## 2022-09-12 DIAGNOSIS — D64.9 ANEMIA, UNSPECIFIED: ICD-10-CM

## 2022-09-12 DIAGNOSIS — Z00.00 ENCOUNTER FOR GENERAL ADULT MEDICAL EXAMINATION WITHOUT ABNORMAL FINDINGS: ICD-10-CM

## 2022-09-12 DIAGNOSIS — R33.9 RETENTION OF URINE, UNSPECIFIED: ICD-10-CM

## 2022-09-12 DIAGNOSIS — B20 HUMAN IMMUNODEFICIENCY VIRUS [HIV] DISEASE: ICD-10-CM

## 2022-09-12 DIAGNOSIS — R29.898 OTHER SYMPTOMS AND SIGNS INVOLVING THE MUSCULOSKELETAL SYSTEM: ICD-10-CM

## 2022-09-12 LAB
4/8 RATIO: 0.25 RATIO — LOW (ref 0.9–3.6)
ABS CD8: 940 /UL — HIGH (ref 142–740)
ALBUMIN SERPL ELPH-MCNC: 3.4 G/DL — SIGNIFICANT CHANGE UP (ref 3.3–5)
ALP SERPL-CCNC: 72 U/L — SIGNIFICANT CHANGE UP (ref 40–120)
ALT FLD-CCNC: 14 U/L — SIGNIFICANT CHANGE UP (ref 10–45)
ANION GAP SERPL CALC-SCNC: 7 MMOL/L — SIGNIFICANT CHANGE UP (ref 5–17)
APPEARANCE UR: CLEAR — SIGNIFICANT CHANGE UP
AST SERPL-CCNC: 19 U/L — SIGNIFICANT CHANGE UP (ref 10–40)
BASOPHILS # BLD AUTO: 0.02 K/UL — SIGNIFICANT CHANGE UP (ref 0–0.2)
BASOPHILS NFR BLD AUTO: 0.6 % — SIGNIFICANT CHANGE UP (ref 0–2)
BILIRUB SERPL-MCNC: 0.3 MG/DL — SIGNIFICANT CHANGE UP (ref 0.2–1.2)
BILIRUB UR-MCNC: NEGATIVE — SIGNIFICANT CHANGE UP
BUN SERPL-MCNC: 7 MG/DL — SIGNIFICANT CHANGE UP (ref 7–23)
CALCIUM SERPL-MCNC: 7.9 MG/DL — LOW (ref 8.4–10.5)
CD3 BLASTS SPEC-ACNC: 1218 /UL — SIGNIFICANT CHANGE UP (ref 672–1870)
CD3 BLASTS SPEC-ACNC: 82 % — SIGNIFICANT CHANGE UP (ref 59–83)
CD4 %: 16 % — LOW (ref 30–62)
CD8 %: 63 % — HIGH (ref 12–36)
CHLORIDE SERPL-SCNC: 107 MMOL/L — SIGNIFICANT CHANGE UP (ref 96–108)
CO2 SERPL-SCNC: 23 MMOL/L — SIGNIFICANT CHANGE UP (ref 22–31)
COLOR SPEC: YELLOW — SIGNIFICANT CHANGE UP
CREAT SERPL-MCNC: 0.78 MG/DL — SIGNIFICANT CHANGE UP (ref 0.5–1.3)
DIFF PNL FLD: NEGATIVE — SIGNIFICANT CHANGE UP
EGFR: 121 ML/MIN/1.73M2 — SIGNIFICANT CHANGE UP
EOSINOPHIL # BLD AUTO: 0.13 K/UL — SIGNIFICANT CHANGE UP (ref 0–0.5)
EOSINOPHIL NFR BLD AUTO: 3.7 % — SIGNIFICANT CHANGE UP (ref 0–6)
FLUAV H1 2009 PAND RNA SPEC QL NAA+PROBE: SIGNIFICANT CHANGE UP
FLUAV H1 RNA SPEC QL NAA+PROBE: SIGNIFICANT CHANGE UP
FLUAV H3 RNA SPEC QL NAA+PROBE: SIGNIFICANT CHANGE UP
FLUAV SUBTYP SPEC NAA+PROBE: SIGNIFICANT CHANGE UP
FLUBV RNA SPEC QL NAA+PROBE: SIGNIFICANT CHANGE UP
GLUCOSE SERPL-MCNC: 89 MG/DL — SIGNIFICANT CHANGE UP (ref 70–99)
GLUCOSE UR QL: NEGATIVE — SIGNIFICANT CHANGE UP
HCT VFR BLD CALC: 36.1 % — LOW (ref 39–50)
HGB BLD-MCNC: 11.8 G/DL — LOW (ref 13–17)
IMM GRANULOCYTES NFR BLD AUTO: 0.3 % — SIGNIFICANT CHANGE UP (ref 0–1.5)
KETONES UR-MCNC: NEGATIVE — SIGNIFICANT CHANGE UP
LEUKOCYTE ESTERASE UR-ACNC: NEGATIVE — SIGNIFICANT CHANGE UP
LYMPHOCYTES # BLD AUTO: 1.6 K/UL — SIGNIFICANT CHANGE UP (ref 1–3.3)
LYMPHOCYTES # BLD AUTO: 45.8 % — HIGH (ref 13–44)
MAGNESIUM SERPL-MCNC: 1.4 MG/DL — LOW (ref 1.6–2.6)
MCHC RBC-ENTMCNC: 29.4 PG — SIGNIFICANT CHANGE UP (ref 27–34)
MCHC RBC-ENTMCNC: 32.7 GM/DL — SIGNIFICANT CHANGE UP (ref 32–36)
MCV RBC AUTO: 89.8 FL — SIGNIFICANT CHANGE UP (ref 80–100)
MONOCYTES # BLD AUTO: 0.43 K/UL — SIGNIFICANT CHANGE UP (ref 0–0.9)
MONOCYTES NFR BLD AUTO: 12.3 % — SIGNIFICANT CHANGE UP (ref 2–14)
NEUTROPHILS # BLD AUTO: 1.3 K/UL — LOW (ref 1.8–7.4)
NEUTROPHILS NFR BLD AUTO: 37.3 % — LOW (ref 43–77)
NITRITE UR-MCNC: NEGATIVE — SIGNIFICANT CHANGE UP
NRBC # BLD: 0 /100 WBCS — SIGNIFICANT CHANGE UP (ref 0–0)
PH UR: 6 — SIGNIFICANT CHANGE UP (ref 5–8)
PHOSPHATE SERPL-MCNC: 3 MG/DL — SIGNIFICANT CHANGE UP (ref 2.5–4.5)
PLATELET # BLD AUTO: 226 K/UL — SIGNIFICANT CHANGE UP (ref 150–400)
POTASSIUM SERPL-MCNC: 3.4 MMOL/L — LOW (ref 3.5–5.3)
POTASSIUM SERPL-SCNC: 3.4 MMOL/L — LOW (ref 3.5–5.3)
PROT SERPL-MCNC: 6.4 G/DL — SIGNIFICANT CHANGE UP (ref 6–8.3)
PROT UR-MCNC: NEGATIVE MG/DL — SIGNIFICANT CHANGE UP
RAPID RVP RESULT: SIGNIFICANT CHANGE UP
RBC # BLD: 4.02 M/UL — LOW (ref 4.2–5.8)
RBC # FLD: 13.3 % — SIGNIFICANT CHANGE UP (ref 10.3–14.5)
SARS-COV-2 RNA SPEC QL NAA+PROBE: SIGNIFICANT CHANGE UP
SODIUM SERPL-SCNC: 137 MMOL/L — SIGNIFICANT CHANGE UP (ref 135–145)
SP GR SPEC: 1.01 — SIGNIFICANT CHANGE UP (ref 1–1.03)
T-CELL CD4 SUBSET PNL BLD: 232 /UL — LOW (ref 489–1457)
UROBILINOGEN FLD QL: 1 E.U./DL — SIGNIFICANT CHANGE UP
WBC # BLD: 3.49 K/UL — LOW (ref 3.8–10.5)
WBC # FLD AUTO: 3.49 K/UL — LOW (ref 3.8–10.5)

## 2022-09-12 PROCEDURE — 99223 1ST HOSP IP/OBS HIGH 75: CPT | Mod: GC

## 2022-09-12 RX ORDER — MAGNESIUM SULFATE 500 MG/ML
2 VIAL (ML) INJECTION ONCE
Refills: 0 | Status: COMPLETED | OUTPATIENT
Start: 2022-09-12 | End: 2022-09-12

## 2022-09-12 RX ORDER — BICTEGRAVIR SODIUM, EMTRICITABINE, AND TENOFOVIR ALAFENAMIDE FUMARATE 30; 120; 15 MG/1; MG/1; MG/1
1 TABLET ORAL DAILY
Refills: 0 | Status: DISCONTINUED | OUTPATIENT
Start: 2022-09-12 | End: 2022-09-15

## 2022-09-12 RX ORDER — INFLUENZA VIRUS VACCINE 15; 15; 15; 15 UG/.5ML; UG/.5ML; UG/.5ML; UG/.5ML
0.5 SUSPENSION INTRAMUSCULAR ONCE
Refills: 0 | Status: DISCONTINUED | OUTPATIENT
Start: 2022-09-12 | End: 2022-09-15

## 2022-09-12 RX ORDER — LANOLIN ALCOHOL/MO/W.PET/CERES
3 CREAM (GRAM) TOPICAL AT BEDTIME
Refills: 0 | Status: DISCONTINUED | OUTPATIENT
Start: 2022-09-12 | End: 2022-09-12

## 2022-09-12 RX ORDER — QUETIAPINE FUMARATE 200 MG/1
200 TABLET, FILM COATED ORAL AT BEDTIME
Refills: 0 | Status: DISCONTINUED | OUTPATIENT
Start: 2022-09-12 | End: 2022-09-15

## 2022-09-12 RX ORDER — QUETIAPINE FUMARATE 200 MG/1
100 TABLET, FILM COATED ORAL ONCE
Refills: 0 | Status: COMPLETED | OUTPATIENT
Start: 2022-09-12 | End: 2022-09-12

## 2022-09-12 RX ORDER — ENOXAPARIN SODIUM 100 MG/ML
40 INJECTION SUBCUTANEOUS EVERY 24 HOURS
Refills: 0 | Status: DISCONTINUED | OUTPATIENT
Start: 2022-09-12 | End: 2022-09-15

## 2022-09-12 RX ORDER — ACETAMINOPHEN 500 MG
650 TABLET ORAL EVERY 6 HOURS
Refills: 0 | Status: DISCONTINUED | OUTPATIENT
Start: 2022-09-12 | End: 2022-09-15

## 2022-09-12 RX ORDER — POTASSIUM CHLORIDE 20 MEQ
20 PACKET (EA) ORAL
Refills: 0 | Status: COMPLETED | OUTPATIENT
Start: 2022-09-12 | End: 2022-09-12

## 2022-09-12 RX ORDER — LANOLIN ALCOHOL/MO/W.PET/CERES
5 CREAM (GRAM) TOPICAL AT BEDTIME
Refills: 0 | Status: DISCONTINUED | OUTPATIENT
Start: 2022-09-12 | End: 2022-09-15

## 2022-09-12 RX ORDER — POTASSIUM CHLORIDE 20 MEQ
40 PACKET (EA) ORAL ONCE
Refills: 0 | Status: COMPLETED | OUTPATIENT
Start: 2022-09-12 | End: 2022-09-12

## 2022-09-12 RX ADMIN — Medication 650 MILLIGRAM(S): at 10:23

## 2022-09-12 RX ADMIN — BICTEGRAVIR SODIUM, EMTRICITABINE, AND TENOFOVIR ALAFENAMIDE FUMARATE 1 TABLET(S): 30; 120; 15 TABLET ORAL at 11:46

## 2022-09-12 RX ADMIN — Medication 20 MILLIEQUIVALENT(S): at 15:16

## 2022-09-12 RX ADMIN — Medication 50 MILLIGRAM(S): at 21:51

## 2022-09-12 RX ADMIN — Medication 650 MILLIGRAM(S): at 09:56

## 2022-09-12 RX ADMIN — Medication 20 MILLIEQUIVALENT(S): at 18:33

## 2022-09-12 RX ADMIN — Medication 5 MILLIGRAM(S): at 21:51

## 2022-09-12 RX ADMIN — QUETIAPINE FUMARATE 100 MILLIGRAM(S): 200 TABLET, FILM COATED ORAL at 11:46

## 2022-09-12 RX ADMIN — Medication 50 MILLIGRAM(S): at 15:16

## 2022-09-12 RX ADMIN — Medication 20 MILLIEQUIVALENT(S): at 16:48

## 2022-09-12 RX ADMIN — Medication 25 GRAM(S): at 18:33

## 2022-09-12 RX ADMIN — Medication 40 MILLIEQUIVALENT(S): at 09:55

## 2022-09-12 RX ADMIN — QUETIAPINE FUMARATE 200 MILLIGRAM(S): 200 TABLET, FILM COATED ORAL at 21:51

## 2022-09-12 RX ADMIN — Medication 50 MILLIGRAM(S): at 11:46

## 2022-09-12 NOTE — PATIENT PROFILE ADULT - FUNCTIONAL ASSESSMENT - BASIC MOBILITY 6.
3-calculated by average/Not able to assess (calculate score using [facility] averaging method)

## 2022-09-12 NOTE — ED PROVIDER NOTE - CARE PLAN
1 Principal Discharge DX:	Lower extremity weakness  Secondary Diagnosis:	Urinary retention  Secondary Diagnosis:	HIV infection

## 2022-09-12 NOTE — H&P ADULT - PROBLEM SELECTOR PLAN 5
Baseline hb ~12. on arrival hb 12.6, MCV 91.9. Normocytic anemia likely due to chronic disease. no active bleeding.   -Maintain active T&S  -Blood transfusion hb<7

## 2022-09-12 NOTE — ED PROVIDER NOTE - OBJECTIVE STATEMENT
31 y/o M  congenital HIV, chronic back pain, HIV myelopathy and asthma presenting with fluctuating LE weakness and urinary retention, for which he was admitted in early Sept 2022 at [facility] where he had a normal MRI T/L spine and CSF serologies. At that time he declined discharge to [facility] and has instead been in a shelter for the past 2 weeks, getting progressively more weak w/urinary incontinence/retention. He also has been lost to f/u and off meds. Earlier this month had a CD4 in 300s w/VL in 800s. Pt denies CP/SOB. No fever/chills. No abd pain or n/v/c/d, though at times is incontinent of stool which he says has been on/off for years as with the urinary retention and LE weakness. He suffers from frequent LE muscle spasms for which he normally takes baclofen, lyrica, and gabapentin but not currently taking. No HA, visual sx, neck pain, UE complaints or sensory complaints.    Lower extremity weakness.    - MRI T/L spine: no cord/cauda equina compression; no abnl cord signal or enhancement  - Serologies neg.   - followed by ID, do not suspect 2/2 HIV myelopathy as is considered end stage disease of HIV, plus patient CD4 count and VL in good range   - followed by neurology   - PT recommended [facility], patient decline wants outpatient PT  - MRI negative for acute pathologies

## 2022-09-12 NOTE — ED PROVIDER NOTE - PHYSICAL EXAMINATION
VITAL SIGNS: I have reviewed nursing notes and confirm.  CONSTITUTIONAL: Well-developed; well-nourished; in no acute distress.  SKIN: Skin exam is warm and dry, no acute rash.  HEAD: Normocephalic; atraumatic.  EYES: PERRL, EOM intact; conjunctiva and sclera clear.  ENT: No nasal discharge; airway clear.  NECK: Supple; non tender.  CARD:  Regular rate and rhythm.  RESP: Unlabored. No wheezes, rales or rhonchi.  ABD: soft; non-distended; non-tender  BACK: no midline TTP  EXT: Normal ROM. No cyanosis or edema. Non-ttp all ext, distal pulses intact  NEURO: Alert, oriented x, CN 2-12 intact b/l, 5/5 strength UE's, 2/5 strength LE's b/l, sensation intact, gait deferred, + rectal tone  PSYCH: Cooperative, appropriate.

## 2022-09-12 NOTE — PHYSICAL THERAPY INITIAL EVALUATION ADULT - PLANNED THERAPY INTERVENTIONS, PT EVAL
balance training/bed mobility training/gait training/motor coordination training/neuromuscular re-education/orthotic fitting/training/postural re-education/ROM/strengthening/transfer training

## 2022-09-12 NOTE — ED ADULT NURSE REASSESSMENT NOTE - NS ED NURSE REASSESS COMMENT FT1
pt resting in bed, provided food and call bell. Breathing is even and unlabored. denies pain at this time.

## 2022-09-12 NOTE — PHYSICAL THERAPY INITIAL EVALUATION ADULT - IMPAIRMENTS FOUND, PT EVAL
gait, locomotion, and balance/muscle strength/neuromotor development and sensory integration/posture

## 2022-09-12 NOTE — PHYSICAL THERAPY INITIAL EVALUATION ADULT - ANKLE PLANTARFLEXION MMT, REHAB EVAL
GENE formally, pt reporting he has to use the bathroom./GENE formally, pt reporting he has to use the bathroom

## 2022-09-12 NOTE — PHYSICAL THERAPY INITIAL EVALUATION ADULT - MODALITIES TREATMENT COMMENTS
Pt performed toilet transfer with bedside commode over toilet and use of RW and L side guard rail; Min-ModAx1

## 2022-09-12 NOTE — PHYSICAL THERAPY INITIAL EVALUATION ADULT - GENERAL OBSERVATIONS, REHAB EVAL
PT IE Completed. Pt received semi-supine in bed, A&Ox4, +RA,+heplock, in NAD and agreeable to work with PT, ANDREA Govea notified.Pt presents to [facility] with fluctuating LE weakness and urinary retention. PT exam shows ataxic gait and 2 to 3-/5 bilateral LE strength. Pt completed bed mob, transfers, gait and toileting with 1 person assist.  Pt left in semi-supine, +bed alarm, +call mar within reach, ANDREA Govea notified. Pt can benefit from PT in order to improve quality of life by increasing strength/endurance/balance with functional mobility and ADLS.

## 2022-09-12 NOTE — H&P ADULT - PROBLEM SELECTOR PLAN 2
- Patient with congenital HIV  - Following with ID, Dr. Hunter  - CD4 count 335,   - c/w Biktarvy  - Repeat VL in 2 wks to followup with ID as outpatient   - followed by ID during admission - Patient with congenital HIV  - Following with ID, Dr. Hunter  - CD4 count 335,   - c/w Biktarvy  - Repeat VL in 2 wks to followup with ID as outpatient   - followed by ID during admission    ID:  Easton Hunter)  [address] [place] [address]  Phone: [phone]  Fax: [phone]

## 2022-09-12 NOTE — PHYSICAL THERAPY INITIAL EVALUATION ADULT - TRANSFER SAFETY CONCERNS NOTED: SIT/STAND, REHAB EVAL
Pt performed STS from EOB with RW using momentum and UEs to clear the surface. Pt with unsteadiness during transfer, gait belt utilized./decreased safety awareness/losing balance/decreased weight-shifting ability

## 2022-09-12 NOTE — PHYSICAL THERAPY INITIAL EVALUATION ADULT - BALANCE DISTURBANCE, IDENTIFIED IMPAIRMENT CONTRIBUTE, REHAB EVAL
Statement Selected impaired coordination/impaired motor control/impaired postural control/decreased strength

## 2022-09-12 NOTE — PATIENT PROFILE ADULT - FALL HARM RISK - HARM RISK INTERVENTIONS
Assistance with ambulation/Assistance OOB with selected safe patient handling equipment/Communicate Risk of Fall with Harm to all staff/Discuss with provider need for PT consult/Monitor gait and stability/Reinforce activity limits and safety measures with patient and family/Tailored Fall Risk Interventions/Use of alarms - bed, chair and/or voice tab/Visual Cue: Yellow wristband and red socks/Bed in lowest position, wheels locked, appropriate side rails in place/Call bell, personal items and telephone in reach/Instruct patient to call for assistance before getting out of bed or chair/Non-slip footwear when patient is out of bed/[place] to call system/Physically safe environment - no spills, clutter or unnecessary equipment/Purposeful Proactive Rounding/Room/bathroom lighting operational, light cord in reach

## 2022-09-12 NOTE — PHYSICAL THERAPY INITIAL EVALUATION ADULT - GAIT DEVIATIONS NOTED, PT EVAL
decreased cabrera/footdrop/decreased step length/decreased stride length/decreased weight-shifting ability

## 2022-09-12 NOTE — CONSULT NOTE ADULT - ASSESSMENT
per Internal Medicine    31 y/o M  congenital HIV (CD4 count 335,  early September 2022 on Biktarvy,  following with ID, Dr. Hunter), chronic back pain, concern for HIV myelopathy (not approved by ID yet ion previous admission) and asthma, presenting with fluctuating LE weakness and urinary retention transferred from [facility] pending dispo plan to [facility].     Problem/Plan - 1:  ·  Problem: Lower extremity weakness.   ·  Plan: Presented to the [facility] with LE weakness. Had extensive work up at [facility] end of August early Sept 2022. Extensive imaging done  From previous admission notes:  congenital HIV, chronic back pain, substance use, ?HIV myelopathy,  fluctuations in worsening lower extremity weakness and urinary retention. He was seen by neurology here in 8/9/22 for 3 days of LE weakness of toes to hips, 2-3 days bowel/bladder incontinence, lower back pain, muscle spasms in lumbar back. Did not have saddle anesthesia then or now. Takes baclofen, gabapentin, lyrica. (Off from meds from September) Has been seen multiple times in past by neurology for similar complaints of b/l LE weakness. Prior workup was not c/w GBS but potentially myelopathy 2/2 HIV infection (Neurology was concern for HIV myelopathy but ID was not concern for HIV myelopathy. ) Has hx of normal LP and normal spine MRI's. MRI lumbar spines (6/5/21, 5/21/21, 5/2/21, 10/26/20). MRI cervical spine (5/2/21), MRI thoracic spine (5/2/21, 10/26/20), MRI head (3/25/20). HIE notes from other systems report EMG/NCS in 2021 as being normal. .   - MRI T/L spine: no cord/cauda equina compression; no abnl cord signal or enhancement  - Serologies neg. CSF negative.   - followed by ID, do not suspect 2/2 HIV myelopathy as is considered end stage disease of HIV, plus patient CD4 count and VL in good range   - followed by neurology   - PT recommended [facility] in September 2022, patient decline wanted outpatient PT  - MRI negative for acute pathologies  -C/w home meds, Baclofen 10mg, pregabalin 50 milliGRAM(s) Oral three times a day  -Pending [facility] placement  -F/u PT/OT.    Problem/Plan - 2:  ·  Problem: HIV disease.   ·  Plan: - Patient with congenital HIV  - Following with ID, Dr. Hunter  - CD4 count 335,   - c/w Biktarvy  - Repeat VL in 2 wks to followup with ID as outpatient   - followed by ID during admission    ID:  Eastno Hunter)  [address] [place] [address]  Phone: [phone]  Fax: [phone].    Problem/Plan - 3:  ·  Problem: Urinary retention.   ·  Plan: - pt p/w 1 day of difficulty with urination  - Could be 2/2 to HIV myelopathy v. deconditioning  - MRI negative for cord compression or cauda equina  - Had UOP 600cc in AM and had BM too today   -Strict UOP monitoring.    Problem/Plan - 4:  ·  Problem: Night terrors.   ·  Plan: From previous records:  - reported history of night terrors as well as possible PTSD  - denied SI/HI   - Seen by  during previous hospitalization  - c/w home seroquel 200mg bedtime and seroquel 100mg qAM.    Problem/Plan - 5:  ·  Problem: Anemia.   ·  Plan: Baseline hb ~12. on arrival hb 12.6, MCV 91.9. Normocytic anemia likely due to chronic disease. no active bleeding.   -Maintain active T&S  -Blood transfusion hb<7.    Problem/Plan - 6:  ·  Problem: Healthcare maintenance.   ·  Plan: F: s/p I L NS  E: Replete as needed  Diet: regular  Dvt ppx: Lovenox  GI ppx: None  Code: Full  Dispo: [facility].

## 2022-09-12 NOTE — H&P ADULT - ASSESSMENT
INCOMPLETE  Pt is 33 y/o M  congenital HIV (CD4 count 335,  early September 2022 on Biktarvy,  following with ID, Dr. Hunter), chronic back pain, concern for HIV myelopathy (not approved by ID yet ion previous admission) and asthma, presenting with fluctuating LE weakness and urinary retention transferred from [facility] pending dispo plan to [facility].

## 2022-09-12 NOTE — H&P ADULT - PROBLEM SELECTOR PLAN 3
- pt p/w 1 day of difficulty with urination  - Could be 2/2 to HIV myelopathy v. deconditioning  - MRI negative for cord compression or cauda equina  - Had UOP 600cc in AM and had BM too today   -Strict UOP monitoring

## 2022-09-12 NOTE — H&P ADULT - NSHPLABSRESULTS_GEN_ALL_CORE
.  LABS:                         12.6   4.29  )-----------( 261      ( 11 Sep 2022 23:31 )             39.6         140  |  104  |  8   ----------------------------<  71  3.2<L>   |  26  |  0.95    Ca    8.6      11 Sep 2022 23:31    TPro  7.7  /  Alb  3.8  /  TBili  0.4  /  DBili  x   /  AST  22  /  ALT  13  /  AlkPhos  79  -11    PT/INR - ( 11 Sep 2022 23:31 )   PT: 12.8 sec;   INR: 1.10          PTT - ( 11 Sep 2022 23:31 )  PTT:30.5 sec  Urinalysis Basic - ( 11 Sep 2022 23:31 )    Color: Yellow / Appearance: Clear / S.015 / pH: x  Gluc: x / Ketone: NEGATIVE  / Bili: NEGATIVE / Urobili: 1.0 E.U./dL   Blood: x / Protein: NEGATIVE mg/dL / Nitrite: NEGATIVE   Leuk Esterase: NEGATIVE / RBC: x / WBC x   Sq Epi: x / Non Sq Epi: x / Bacteria: x            Lactate, Blood: 0.7 mmoL/L ( @ 23:31)      RADIOLOGY, EKG & ADDITIONAL TESTS: Reviewed.

## 2022-09-12 NOTE — PHYSICAL THERAPY INITIAL EVALUATION ADULT - ANKLE DORSIFLEXION MMT, REHAB EVAL
GENE formally, pt reporting he has to use the bathroom. Footdrop demonstrated during ambulation/GENE formally, pt reporting he has to use the bathroom.

## 2022-09-12 NOTE — H&P ADULT - PROBLEM SELECTOR PLAN 4
From previous records:  - reported history of night terrors as well as possible PTSD  - denied SI/HI   - Seen by  during previous hospitalization  - c/w home seroquel 200mg qHS and seroquel 100mg qAM. From previous records:  - reported history of night terrors as well as possible PTSD  - denied SI/HI   - Seen by  during previous hospitalization  - c/w home seroquel 200mg bedtime and seroquel 100mg qAM.

## 2022-09-12 NOTE — PHYSICAL THERAPY INITIAL EVALUATION ADULT - ADDITIONAL COMMENTS
Pt reports [facility] and has had 6 falls in the past 6 months. Pt states hes supposed to be using a cane but does not use it.

## 2022-09-12 NOTE — CONSULT NOTE ADULT - SUBJECTIVE AND OBJECTIVE BOX
Patient is a 32y old  Male who presents with a chief complaint of Lower extremities weakness (12 Sep 2022 07:47)        HPI:  Previous MRN from [facility] : [id number]  MOST OF INFORMATION COLLECTED FROM PREVIOUS ADMISSION NOTES AND ED NOTES SINCE PATIENT REFUSED TO SPEAK AND DID NOT ANSWER ANY QUESTIONS AND REFUSED PHYSICAL EXAM.    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 He was admitted in early 2022 at [facility] where he had a normal MRI T/L spine and CSF serologies. At that time he declined discharge to [facility] and has instead been in a shelter for the past 2 weeks, getting progressively more weak w/urinary incontinence/retention. He also has been lost to f/u and off meds. In ED pt denied CP/SOB, fever/chills, No abd pain or n/v/c/d, though at times is incontinent of stool which he says has been on/off for years as with the urinary retention and LE weakness. He suffers from frequent LE muscle spasms for which he normally takes baclofen, lyrica, and gabapentin but not currently taking. In the ED patient did not report HA, Visual  sx, neck pain, UE complaints or sensory complaints.    [facility] admission records: (Early 2022):   Lower extremity weakness.    - MRI T/L spine: no cord/cauda equina compression; no abnl cord signal or enhancement  - Serologies neg.   - followed by ID, do not suspect 2/2 HIV myelopathy as is considered end stage disease of HIV, plus patient CD4 count and VL in good range   - followed by neurology and ID concern for HIV myelopathy.   - PT recommended [facility], patient decline wants outpatient PT  - MRI negative for acute pathologies      [facility] ED:  VS:   HR: 82 BP: 130/86 T: 97.7  RR: 15 /98  Labs: hb 12.6 , k 3.2, cr 0.95.   Imaging: ECG: HR 56, NSR , , QRS 92, QTc 380  Treatment: Baclofen 10mg, Gabapentin 300mg, Toradol IVP 15mg. Morphine 4mg, NS 1L (12 Sep 2022 07:47)      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      Asthma      Closed fracture of multiple ribs of right side, initial encounter      Cocaine abuse      Chronic sinusitis      Homeless      History of orthopedic surgery  left arm          MEDICATIONS  (STANDING):  bictegravir 50 mG/emtricitabine 200 mG/tenofovir alafenamide 25 mG (BIKTARVY) 1 Tablet(s) Oral daily  enoxaparin Injectable 40 milliGRAM(s) SubCutaneous every 24 hours  influenza   Vaccine 0.5 milliLiter(s) IntraMuscular once  melatonin 5 milliGRAM(s) Oral at bedtime  pregabalin 50 milliGRAM(s) Oral three times a day  QUEtiapine 200 milliGRAM(s) Oral at bedtime    MEDICATIONS  (PRN):  acetaminophen     Tablet .. 650 milliGRAM(s) Oral every 6 hours PRN Temp greater or equal to 38C (100.4F), Mild Pain (1 - 3)        Social History:                   -  Home Living Status :    shelter     Baseline Functional Level Prior to Admission :             - ADL's/ IADL's :           independent         - ambulatory status :   walked with no DME         FAMILY HISTORY:  FH: HIV infection (Mother)      CBC Full  -  ( 12 Sep 2022 11:06 )  WBC Count : 3.49 K/uL  RBC Count : 4.02 M/uL  Hemoglobin : 11.8 g/dL  Hematocrit : 36.1 %  Platelet Count - Automated : 226 K/uL  Mean Cell Volume : 89.8 fl  Mean Cell Hemoglobin : 29.4 pg  Mean Cell Hemoglobin Concentration : 32.7 gm/dL  Auto Neutrophil # : 1.30 K/uL  Auto Lymphocyte # : 1.60 K/uL  Auto Monocyte # : 0.43 K/uL  Auto Eosinophil # : 0.13 K/uL  Auto Basophil # : 0.02 K/uL  Auto Neutrophil % : 37.3 %  Auto Lymphocyte % : 45.8 %  Auto Monocyte % : 12.3 %  Auto Eosinophil % : 3.7 %  Auto Basophil % : 0.6 %          137  |  107  |  7   ----------------------------<  89  3.4<L>   |  23  |  0.78    Ca    7.9<L>      12 Sep 2022 11:06    TPro  6.4  /  Alb  3.4  /  TBili  0.3  /  DBili  x   /  AST  19  /  ALT  14  /  AlkPhos  72        Urinalysis Basic - ( 11 Sep 2022 23:31 )    Color: Yellow / Appearance: Clear / S.015 / pH: x  Gluc: x / Ketone: NEGATIVE  / Bili: NEGATIVE / Urobili: 1.0 E.U./dL   Blood: x / Protein: NEGATIVE mg/dL / Nitrite: NEGATIVE   Leuk Esterase: NEGATIVE / RBC: x / WBC x   Sq Epi: x / Non Sq Epi: x / Bacteria: x          Radiology :                  Vital Signs Last 24 Hrs  T(C): 36.6 (12 Sep 2022 08:52), Max: 36.6 (12 Sep 2022 06:05)  T(F): 97.9 (12 Sep 2022 08:52), Max: 97.9 (12 Sep 2022 08:52)  HR: 66 (12 Sep 2022 08:52) (64 - 82)  BP: 113/66 (12 Sep 2022 08:52) (113/66 - 144/88)  BP(mean): --  RR: 16 (12 Sep 2022 08:52) (15 - 16)  SpO2: 99% (12 Sep 2022 08:52) (98% - 99%)    Parameters below as of 12 Sep 2022 08:52  Patient On (Oxygen Delivery Method): room air            REVIEW OF SYSTEMS:  per HPI            Physical Exam:  32 y o man  lying in semi Alvarez's position , awake , alert , c/o feeling weak     Neurologic Exam:    Alert and oriented  x 3     Cranial Nerves:     II :                         pupils equal , round and reactive to light , visual fields intact   III/ IV/VI :              extraocular movements intact , neg nystagmus , neg ptosis  V :                        facial sensation intact , V1-3 normal  VII :                      face symmetric , no droop , normal eye closure and smile  VIII :                     hearing intact to finger rub bilaterally  IX and X :             no hoarseness , gag intact , palate/ uvula rise symmetrically  XI :                       SCM / trapezius strength intact bilateral  XII :                      no tongue deviation    Motor Exam:    Right UE:               no focal weakness ,  > 3+/5 throughout     negative pronator drift                               Left UE:                 no focal weakness,  > 3+/5 throughout     negative pronator drift        Right LE:                no focal weakness,  > 3+/5 throughout     Left LE:                  no focal weakness,  > 3+/5 throughout           Sensation:         intact to light touch x 4 extremities                        DTR :                     biceps/brachioradialis : equal                                              patella/ankle : equal                                                                               neg Babinski     Gait :  not tested              PM&R Impression :     1) deconditioned    2) no focal weakness      Recommendations / Plan :     1) Physical / Occupational therapy focusing on therapeutic exercises , equipment evaluation , bed mobility/transfer out of bed evaluation , progressive ambulation with assistive devices prn .    2) Anticipated Disposition Plan / Recs  :    subacute rehab placement

## 2022-09-12 NOTE — H&P ADULT - HISTORY OF PRESENT ILLNESS
33 y/o M  congenital HIV, chronic back pain, HIV myelopathy and asthma presenting with fluctuating LE weakness and urinary retention, for which he was admitted in early Sept 2022 at [facility] where he had a normal MRI T/L spine and CSF serologies. At that time he declined discharge to [facility] and has instead been in a shelter for the past 2 weeks, getting progressively more weak w/urinary incontinence/retention. He also has been lost to f/u and off meds. Earlier this month had a CD4 in 300s w/VL in 800s. Pt denies CP/SOB. No fever/chills. No abd pain or n/v/c/d, though at times is incontinent of stool which he says has been on/off for years as with the urinary retention and LE weakness. He suffers from frequent LE muscle spasms for which he normally takes baclofen, lyrica, and gabapentin but not currently taking. No HA, visual sx, neck pain, UE complaints or sensory complaints.    	Lower extremity weakness.    	- MRI T/L spine: no cord/cauda equina compression; no abnl cord signal or enhancement  	- Serologies neg.   	- followed by ID, do not suspect 2/2 HIV myelopathy as is considered end stage disease of HIV, plus patient CD4 count and VL in good range   	- followed by neurology   	- PT recommended [facility], patient decline wants outpatient PT  - MRI negative for acute pathologies    ED:   VS:   HR:  BP:  T:   RR:  /  Labs:   Imaging:  Treatment:  INCOMEPLET      Previous MRN from [facility] : [id number]    31 y/o M  congenital HIV (CD4 , chronic back pain, HIV myelopathy and asthma presenting with fluctuating LE weakness and urinary retention, for which he was admitted in early Sept 2022 at [facility] where he had a normal MRI T/L spine and CSF serologies. At that time he declined discharge to [facility] and has instead been in a shelter for the past 2 weeks, getting progressively more weak w/urinary incontinence/retention. He also has been lost to f/u and off meds. Earlier this month had a CD4 in 300s w/VL in 800s. Pt denies CP/SOB. No fever/chills. No abd pain or n/v/c/d, though at times is incontinent of stool which he says has been on/off for years as with the urinary retention and LE weakness. He suffers from frequent LE muscle spasms for which he normally takes baclofen, lyrica, and gabapentin but not currently taking. No HA, visual sx, neck pain, UE complaints or sensory complaints.    	Lower extremity weakness.    	- MRI T/L spine: no cord/cauda equina compression; no abnl cord signal or enhancement  	- Serologies neg.   	- followed by ID, do not suspect 2/2 HIV myelopathy as is considered end stage disease of HIV, plus patient CD4 count and VL in good range   	- followed by neurology   	- PT recommended [facility], patient decline wants outpatient PT  - MRI negative for acute pathologies    ED:   VS:   HR:  BP:  T:   RR:  /  Labs:   Imaging:  Treatment:  Previous MRN from [facility] : [id number]  MOST OF INFORMATION COLLECTED FROM PREVIOUS ADMISSION NOTES AND ED NOTES SINCE PATIENT REFUSED TO SPEAK AND DID NOT ANSWER ANY QUESTIONS AND REFUSED PHYSICAL EXAM.    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 He was admitted in early Sept 2022 at [facility] where he had a normal MRI T/L spine and CSF serologies. At that time he declined discharge to [facility] and has instead been in a shelter for the past 2 weeks, getting progressively more weak w/urinary incontinence/retention. He also has been lost to f/u and off meds. In ED pt denied CP/SOB, fever/chills, No abd pain or n/v/c/d, though at times is incontinent of stool which he says has been on/off for years as with the urinary retention and LE weakness. He suffers from frequent LE muscle spasms for which he normally takes baclofen, lyrica, and gabapentin but not currently taking. No HA, visual sx, neck pain, UE complaints or sensory complaints.    	Lower extremity weakness.    	- MRI T/L spine: no cord/cauda equina compression; no abnl cord signal or enhancement  	- Serologies neg.   	- followed by ID, do not suspect 2/2 HIV myelopathy as is considered end stage disease of HIV, plus patient CD4 count and VL in good range   	- followed by neurology   	- PT recommended [facility], patient decline wants outpatient PT  - MRI negative for acute pathologies    ED:   VS:   HR:  BP:  T:   RR:  /  Labs:   Imaging:  Treatment:  Previous MRN from [facility] : [id number]  MOST OF INFORMATION COLLECTED FROM PREVIOUS ADMISSION NOTES AND ED NOTES SINCE PATIENT REFUSED TO SPEAK AND DID NOT ANSWER ANY QUESTIONS AND REFUSED PHYSICAL EXAM.    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 He was admitted in early Sept 2022 at [facility] where he had a normal MRI T/L spine and CSF serologies. At that time he declined discharge to [facility] and has instead been in a shelter for the past 2 weeks, getting progressively more weak w/urinary incontinence/retention. He also has been lost to f/u and off meds. In ED pt denied CP/SOB, fever/chills, No abd pain or n/v/c/d, though at times is incontinent of stool which he says has been on/off for years as with the urinary retention and LE weakness. He suffers from frequent LE muscle spasms for which he normally takes baclofen, lyrica, and gabapentin but not currently taking. In the ED patient did not report HA, Visual  sx, neck pain, UE complaints or sensory complaints.    [facility] admission records: (Early September 2022):   Lower extremity weakness.    - MRI T/L spine: no cord/cauda equina compression; no abnl cord signal or enhancement  - Serologies neg.   - followed by ID, do not suspect 2/2 HIV myelopathy as is considered end stage disease of HIV, plus patient CD4 count and VL in good range   - followed by neurology and ID concern for HIV myelopathy.   - PT recommended [facility], patient decline wants outpatient PT  - MRI negative for acute pathologies      [facility] ED:  VS:   HR:  BP:  T:   RR:  /  Labs:   Imaging:  Treatment:  Previous MRN from [facility] : [id number]  MOST OF INFORMATION COLLECTED FROM PREVIOUS ADMISSION NOTES AND ED NOTES SINCE PATIENT REFUSED TO SPEAK AND DID NOT ANSWER ANY QUESTIONS AND REFUSED PHYSICAL EXAM.    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 He was admitted in early Sept 2022 at [facility] where he had a normal MRI T/L spine and CSF serologies. At that time he declined discharge to [facility] and has instead been in a shelter for the past 2 weeks, getting progressively more weak w/urinary incontinence/retention. He also has been lost to f/u and off meds. In ED pt denied CP/SOB, fever/chills, No abd pain or n/v/c/d, though at times is incontinent of stool which he says has been on/off for years as with the urinary retention and LE weakness. He suffers from frequent LE muscle spasms for which he normally takes baclofen, lyrica, and gabapentin but not currently taking. In the ED patient did not report HA, Visual  sx, neck pain, UE complaints or sensory complaints.    [facility] admission records: (Early September 2022):   Lower extremity weakness.    - MRI T/L spine: no cord/cauda equina compression; no abnl cord signal or enhancement  - Serologies neg.   - followed by ID, do not suspect 2/2 HIV myelopathy as is considered end stage disease of HIV, plus patient CD4 count and VL in good range   - followed by neurology and ID concern for HIV myelopathy.   - PT recommended [facility], patient decline wants outpatient PT  - MRI negative [facility] acute pathologies      [facility]  [facility]   HR: 82 BP: 130/86 T: 97.7  RR: 15 /98  Labs: hb 12.6 , k 3.2, cr 0.95.   Imaging: None   Treatment: Bclofen 10mg, Gabapentin 300mg, Toradol IVP 15mg. Morphine 4mg, NS 1L Previous MRN from [facility] : [id number]  MOST OF INFORMATION COLLECTED FROM PREVIOUS ADMISSION NOTES AND ED NOTES SINCE PATIENT REFUSED TO SPEAK AND DID NOT ANSWER ANY QUESTIONS AND REFUSED PHYSICAL EXAM.    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 He was admitted in early Sept 2022 at [facility] where he had a normal MRI T/L spine and CSF serologies. At that time he declined discharge to [facility] and has instead been in a shelter for the past 2 weeks, getting progressively more weak w/urinary incontinence/retention. He also has been lost to f/u and off meds. In ED pt denied CP/SOB, fever/chills, No abd pain or n/v/c/d, though at times is incontinent of stool which he says has been on/off for years as with the urinary retention and LE weakness. He suffers from frequent LE muscle spasms for which he normally takes baclofen, lyrica, and gabapentin but not currently taking. In the ED patient did not report HA, Visual  sx, neck pain, UE complaints or sensory complaints.    [facility] admission records: (Early September 2022):   Lower extremity weakness.    - MRI T/L spine: no cord/cauda equina compression; no abnl cord signal or enhancement  - Serologies neg.   - followed by ID, do not suspect 2/2 HIV myelopathy as is considered end stage disease of HIV, plus patient CD4 count and VL in good range   - followed by neurology and ID concern for HIV myelopathy.   - PT recommended [facility], patient decline wants outpatient PT  - MRI negative for acute pathologies      [facility] ED:  VS:   HR: 82 BP: 130/86 T: 97.7  RR: 15 /98  Labs: hb 12.6 , k 3.2, cr 0.95.   Imaging: ECG: HR 56, NSR , , QRS 92, QTc 380  Treatment: Baclofen 10mg, Gabapentin 300mg, Toradol IVP 15mg. Morphine 4mg, NS 1L

## 2022-09-12 NOTE — H&P ADULT - PROBLEM SELECTOR PLAN 6
F: s/p I L NS  E: Replete as needed  Diet: regular  Dvt ppx: Lovenox  GI ppx: None  Code: Full  Dispo: [facility]

## 2022-09-12 NOTE — ED PROVIDER NOTE - CLINICAL SUMMARY MEDICAL DECISION MAKING FREE TEXT BOX
Unable to ambulate 2/2 chronic mylopathy. Given extensive workup (under different MRN, same name) from earlier this month, does not necessitate further imaging at this time but will likely require BRAYDEN placement. Pt aware.

## 2022-09-12 NOTE — H&P ADULT - NSHPPHYSICALEXAM_GEN_ALL_CORE
T(C): 36.6 (09-12-22 @ 06:05), Max: 36.6 (09-12-22 @ 06:05)  HR: 69 (09-12-22 @ 06:05) (64 - 82)  BP: 129/73 (09-12-22 @ 06:05) (126/77 - 144/88)  RR: 16 (09-12-22 @ 06:05) (15 - 16)  SpO2: 99% (09-12-22 @ 06:05) (98% - 99%)    CONSTITUTIONAL: Well groomed, no apparent distress  EYES: PERRLA and symmetric, EOMI, No conjunctival or scleral injection, non-icteric  ENMT: Oral mucosa with moist membranes. Normal dentition; no pharyngeal injection or exudates             NECK: Supple, symmetric and without tracheal deviation   RESP: No respiratory distress, no use of accessory muscles; CTA b/l, no WRR  CV: RRR, +S1S2, no MRG; no JVD; no peripheral edema  GI: Soft, NT, ND, no rebound, no guarding; no palpable masses; no hepatosplenomegaly; no hernia palpated  LYMPH: No cervical LAD or tenderness; no axillary LAD or tenderness; no inguinal LAD or tenderness  MSK: Normal gait; No digital clubbing or cyanosis; examination of the (head/neck/spine/ribs/pelvis, RUE, LUE, RLE, LLE) without misalignment,            Normal ROM without pain, no spinal tenderness, normal muscle strength/tone  SKIN: No rashes or ulcers noted; no subcutaneous nodules or induration palpable  NEURO: CN II-XII intact; normal reflexes in upper and lower extremities, sensation intact in upper and lower extremities b/l to light touch   PSYCH: Appropriate insight/judgment; A+O x 3, mood and affect appropriate, recent/remote memory intact T(C): 36.6 (09-12-22 @ 06:05), Max: 36.6 (09-12-22 @ 06:05)  HR: 69 (09-12-22 @ 06:05) (64 - 82)  BP: 129/73 (09-12-22 @ 06:05) (126/77 - 144/88)  RR: 16 (09-12-22 @ 06:05) (15 - 16)  SpO2: 99% (09-12-22 @ 06:05) (98% - 99%)    CONSTITUTIONAL: Well groomed, no apparent distress. calm and sleep. does not open eyes and refused to do physical exam. does not answer any questions.

## 2022-09-12 NOTE — H&P ADULT - PROBLEM SELECTOR PLAN 1
From previous admission notes: (Presented to the [facility] with LE weakness. Had extensive work up at [facility] end of August early Sept 2022. Extensive imaging done)  congenital HIV, chronic back pain, substance use, ?HIV myelopathy,  fluctuations in worsening lower extremity weakness and urinary retention. He was seen by neurology here in 8/9/22 for 3 days of LE weakness of toes to hips, 2-3 days bowel/bladder incontinence, lower back pain, muscle spasms in lumbar back. Did not have saddle anesthesia then or now. Takes baclofen, gabapentin, lyrica. (Off from meds from September) Has been seen multiple times in past by neurology for similar complaints of b/l LE weakness. Prior workup was not c/w GBS but potentially myelopathy 2/2 HIV infection (Neurology was concern for HIV myelopathy but ID was not concern for HIV myelopathy. ) Has hx of normal LP and normal spine MRI's. MRI lumbar spines (6/5/21, 5/21/21, 5/2/21, 10/26/20). MRI cervical spine (5/2/21), MRI thoracic spine (5/2/21, 10/26/20), MRI head (3/25/20). HIE notes from other systems report EMG/NCS in 2021 as being normal. .   - MRI T/L spine: no cord/cauda equina compression; no abnl cord signal or enhancement  - Serologies neg. CSF negative.   - followed by ID, do not suspect 2/2 HIV myelopathy as is considered end stage disease of HIV, plus patient CD4 count and VL in good range   - followed by neurology   - PT recommended [facility] in September 2022, patient decline wanted outpatient PT  - MRI negative for acute pathologies  -Pending [facility] placement Presented to the [facility] with LE weakness. Had extensive work up at [facility] end of August early Sept 2022. Extensive imaging done  From previous admission notes:  congenital HIV, chronic back pain, substance use, ?HIV myelopathy,  fluctuations in worsening lower extremity weakness and urinary retention. He was seen by neurology here in 8/9/22 for 3 days of LE weakness of toes to hips, 2-3 days bowel/bladder incontinence, lower back pain, muscle spasms in lumbar back. Did not have saddle anesthesia then or now. Takes baclofen, gabapentin, lyrica. (Off from meds from September) Has been seen multiple times in past by neurology for similar complaints of b/l LE weakness. Prior workup was not c/w GBS but potentially myelopathy 2/2 HIV infection (Neurology was concern for HIV myelopathy but ID was not concern for HIV myelopathy. ) Has hx of normal LP and normal spine MRI's. MRI lumbar spines (6/5/21, 5/21/21, 5/2/21, 10/26/20). MRI cervical spine (5/2/21), MRI thoracic spine (5/2/21, 10/26/20), MRI head (3/25/20). HIE notes from other systems report EMG/NCS in 2021 as being normal. .   - MRI T/L spine: no cord/cauda equina compression; no abnl cord signal or enhancement  - Serologies neg. CSF negative.   - followed by ID, do not suspect 2/2 HIV myelopathy as is considered end stage disease of HIV, plus patient CD4 count and VL in good range   - followed by neurology   - PT recommended [facility] in September 2022, patient decline wanted outpatient PT  - MRI negative for acute pathologies  -Pending [facility] placement  -F/u PT/OT Presented to the [facility] with LE weakness. Had extensive work up at [facility] end of August early Sept 2022. Extensive imaging done  From previous admission notes:  congenital HIV, chronic back pain, substance use, ?HIV myelopathy,  fluctuations in worsening lower extremity weakness and urinary retention. He was seen by neurology here in 8/9/22 for 3 days of LE weakness of toes to hips, 2-3 days bowel/bladder incontinence, lower back pain, muscle spasms in lumbar back. Did not have saddle anesthesia then or now. Takes baclofen, gabapentin, lyrica. (Off from meds from September) Has been seen multiple times in past by neurology for similar complaints of b/l LE weakness. Prior workup was not c/w GBS but potentially myelopathy 2/2 HIV infection (Neurology was concern for HIV myelopathy but ID was not concern for HIV myelopathy. ) Has hx of normal LP and normal spine MRI's. MRI lumbar spines (6/5/21, 5/21/21, 5/2/21, 10/26/20). MRI cervical spine (5/2/21), MRI thoracic spine (5/2/21, 10/26/20), MRI head (3/25/20). HIE notes from other systems report EMG/NCS in 2021 as being normal. .   - MRI T/L spine: no cord/cauda equina compression; no abnl cord signal or enhancement  - Serologies neg. CSF negative.   - followed by ID, do not suspect 2/2 HIV myelopathy as is considered end stage disease of HIV, plus patient CD4 count and VL in good range   - followed by neurology   - PT recommended [facility] in September 2022, patient decline wanted outpatient PT  - MRI negative for acute pathologies  -C/w home meds, Baclofen 10mg, pregabalin 50 milliGRAM(s) Oral three times a day  -Pending [facility] placement  -F/u PT/OT

## 2022-09-12 NOTE — PHYSICAL THERAPY INITIAL EVALUATION ADULT - PERTINENT HX OF CURRENT PROBLEM, REHAB EVAL
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 Hunter), chronic back pain, concern for HIV myelopathy (not approved by ID yet ion previous admission) and asthma, presenting with fluctuating LE weakness and urinary retention transferred from [facility] pending dispo plan to [facility].

## 2022-09-13 ENCOUNTER — FORM ENCOUNTER (OUTPATIENT)
Age: 32
End: 2022-09-13

## 2022-09-13 LAB
ALBUMIN SERPL ELPH-MCNC: 4.1 G/DL — SIGNIFICANT CHANGE UP (ref 3.3–5)
ALP SERPL-CCNC: 67 U/L — SIGNIFICANT CHANGE UP (ref 40–120)
ALT FLD-CCNC: 13 U/L — SIGNIFICANT CHANGE UP (ref 10–45)
ANION GAP SERPL CALC-SCNC: 10 MMOL/L — SIGNIFICANT CHANGE UP (ref 5–17)
AST SERPL-CCNC: 17 U/L — SIGNIFICANT CHANGE UP (ref 10–40)
BILIRUB SERPL-MCNC: 0.6 MG/DL — SIGNIFICANT CHANGE UP (ref 0.2–1.2)
BLD GP AB SCN SERPL QL: NEGATIVE — SIGNIFICANT CHANGE UP
BUN SERPL-MCNC: 6 MG/DL — LOW (ref 7–23)
CALCIUM SERPL-MCNC: 9 MG/DL — SIGNIFICANT CHANGE UP (ref 8.4–10.5)
CHLORIDE SERPL-SCNC: 106 MMOL/L — SIGNIFICANT CHANGE UP (ref 96–108)
CO2 SERPL-SCNC: 23 MMOL/L — SIGNIFICANT CHANGE UP (ref 22–31)
CREAT SERPL-MCNC: 0.82 MG/DL — SIGNIFICANT CHANGE UP (ref 0.5–1.3)
CULTURE RESULTS: SIGNIFICANT CHANGE UP
EGFR: 119 ML/MIN/1.73M2 — SIGNIFICANT CHANGE UP
GLUCOSE SERPL-MCNC: 89 MG/DL — SIGNIFICANT CHANGE UP (ref 70–99)
HCT VFR BLD CALC: 39.8 % — SIGNIFICANT CHANGE UP (ref 39–50)
HGB BLD-MCNC: 12.9 G/DL — LOW (ref 13–17)
HIV-1 VIRAL LOAD RESULT: ABNORMAL
HIV1 RNA # SERPL NAA+PROBE: SIGNIFICANT CHANGE UP
HIV1 RNA SER-IMP: SIGNIFICANT CHANGE UP
HIV1 RNA SERPL NAA+PROBE-ACNC: ABNORMAL
HIV1 RNA SERPL NAA+PROBE-LOG#: 4.1 — SIGNIFICANT CHANGE UP
MAGNESIUM SERPL-MCNC: 1.5 MG/DL — LOW (ref 1.6–2.6)
MCHC RBC-ENTMCNC: 29.3 PG — SIGNIFICANT CHANGE UP (ref 27–34)
MCHC RBC-ENTMCNC: 32.4 GM/DL — SIGNIFICANT CHANGE UP (ref 32–36)
MCV RBC AUTO: 90.5 FL — SIGNIFICANT CHANGE UP (ref 80–100)
NRBC # BLD: 0 /100 WBCS — SIGNIFICANT CHANGE UP (ref 0–0)
PLATELET # BLD AUTO: 247 K/UL — SIGNIFICANT CHANGE UP (ref 150–400)
POTASSIUM SERPL-MCNC: 3.8 MMOL/L — SIGNIFICANT CHANGE UP (ref 3.5–5.3)
POTASSIUM SERPL-SCNC: 3.8 MMOL/L — SIGNIFICANT CHANGE UP (ref 3.5–5.3)
PROT SERPL-MCNC: 7.3 G/DL — SIGNIFICANT CHANGE UP (ref 6–8.3)
RBC # BLD: 4.4 M/UL — SIGNIFICANT CHANGE UP (ref 4.2–5.8)
RBC # FLD: 13.6 % — SIGNIFICANT CHANGE UP (ref 10.3–14.5)
RH IG SCN BLD-IMP: POSITIVE — SIGNIFICANT CHANGE UP
SODIUM SERPL-SCNC: 139 MMOL/L — SIGNIFICANT CHANGE UP (ref 135–145)
SPECIMEN SOURCE: SIGNIFICANT CHANGE UP
WBC # BLD: 3.77 K/UL — LOW (ref 3.8–10.5)
WBC # FLD AUTO: 3.77 K/UL — LOW (ref 3.8–10.5)

## 2022-09-13 PROCEDURE — 99222 1ST HOSP IP/OBS MODERATE 55: CPT

## 2022-09-13 PROCEDURE — 99232 SBSQ HOSP IP/OBS MODERATE 35: CPT | Mod: GC

## 2022-09-13 RX ORDER — MAGNESIUM SULFATE 500 MG/ML
2 VIAL (ML) INJECTION ONCE
Refills: 0 | Status: COMPLETED | OUTPATIENT
Start: 2022-09-13 | End: 2022-09-13

## 2022-09-13 RX ORDER — DIAZEPAM 5 MG
10 TABLET ORAL ONCE
Refills: 0 | Status: DISCONTINUED | OUTPATIENT
Start: 2022-09-13 | End: 2022-09-14

## 2022-09-13 RX ADMIN — Medication 50 MILLIGRAM(S): at 22:21

## 2022-09-13 RX ADMIN — Medication 5 MILLIGRAM(S): at 22:21

## 2022-09-13 RX ADMIN — BICTEGRAVIR SODIUM, EMTRICITABINE, AND TENOFOVIR ALAFENAMIDE FUMARATE 1 TABLET(S): 30; 120; 15 TABLET ORAL at 12:18

## 2022-09-13 RX ADMIN — Medication 50 MILLIGRAM(S): at 06:35

## 2022-09-13 RX ADMIN — Medication 650 MILLIGRAM(S): at 12:45

## 2022-09-13 RX ADMIN — Medication 650 MILLIGRAM(S): at 13:42

## 2022-09-13 RX ADMIN — QUETIAPINE FUMARATE 200 MILLIGRAM(S): 200 TABLET, FILM COATED ORAL at 22:21

## 2022-09-13 RX ADMIN — Medication 50 MILLIGRAM(S): at 14:32

## 2022-09-13 RX ADMIN — Medication 25 GRAM(S): at 12:17

## 2022-09-13 NOTE — CONSULT NOTE ADULT - SUBJECTIVE AND OBJECTIVE BOX
NEUROLOGY CONSULT    HPI:  Previous MRN from [facility] : [id number]  MOST OF INFORMATION COLLECTED FROM PREVIOUS ADMISSION NOTES AND ED NOTES SINCE PATIENT REFUSED TO SPEAK AND DID NOT ANSWER ANY QUESTIONS AND REFUSED PHYSICAL EXAM.    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 Hunter), chronic back pain, concern for HIV myelopathy (not approved by ID yet on previous admission) and asthma, presenting with fluctuating LE weakness and urinary retention. He was admitted in early 2022 at [facility] where he had a normal MRI T/L spine and CSF serologies. At that time he declined discharge to [facility] and has instead been in a shelter for the past 2 weeks, getting progressively more weak w/urinary incontinence/retention. He also has been lost to f/u and off meds. In ED pt denied CP/SOB, fever/chills, No abd pain or n/v/c/d, though at times is incontinent of stool which he says has been on/off for years as with the urinary retention and LE weakness. He suffers from frequent LE muscle spasms for which he normally takes baclofen, lyrica, and gabapentin but not currently taking. In the ED patient did not report HA, Visual  sx, neck pain, UE complaints or sensory complaints.    [facility] admission records: (Early 2022):   Lower extremity weakness.    - MRI T/L spine: no cord/cauda equina compression; no abnl cord signal or enhancement  - Serologies neg.   - followed by ID, do not suspect 2/2 HIV myelopathy as is considered end stage disease of HIV, plus patient CD4 count and VL in good range   - followed by neurology and ID concern for HIV myelopathy.   - PT recommended [facility], patient decline wants outpatient PT  - MRI negative for acute pathologies      [facility] ED:  VS:   HR: 82 BP: 130/86 T: 97.7  RR: 15 /98  Labs: hb 12.6 , k 3.2, cr 0.95.   Imaging: ECG: HR 56, NSR , , QRS 92, QTc 380  Treatment: Baclofen 10mg, Gabapentin 300mg, Toradol IVP 15mg. Morphine 4mg, NS 1L (12 Sep 2022 07:47)     MEDICATIONS  Home Medications:  melatonin 5 mg oral tablet: 1 tab(s) orally once a day (at bedtime) (12 Sep 2022 10:42)    MEDICATIONS  (STANDING):  bictegravir 50 mG/emtricitabine 200 mG/tenofovir alafenamide 25 mG (BIKTARVY) 1 Tablet(s) Oral daily  enoxaparin Injectable 40 milliGRAM(s) SubCutaneous every 24 hours  influenza   Vaccine 0.5 milliLiter(s) IntraMuscular once  melatonin 5 milliGRAM(s) Oral at bedtime  pregabalin 50 milliGRAM(s) Oral three times a day  QUEtiapine 200 milliGRAM(s) Oral at bedtime    MEDICATIONS  (PRN):  acetaminophen     Tablet .. 650 milliGRAM(s) Oral every 6 hours PRN Temp greater or equal to 38C (100.4F), Mild Pain (1 - 3)      FAMILY HISTORY:  FH: HIV infection (Mother)      SOCIAL HISTORY: negative for tobacco, alcohol, or ilicit drug use.    Allergies    Ceclor (Unknown)  Toradol (Anaphylaxis; Swelling)  Toradol (Swelling)    Intolerances        GEN: NAD, pleasant, cooperative    NEURO:   MENTAL STATUS: AAOx3  LANG/SPEECH: Fluent, intact naming, repetition & comprehension  CRANIAL NERVES:  II: Pupils equal and reactive, no RAPD, normal visual field and fundus  III, IV, VI: EOM intact, no gaze preference or deviation  V: normal  VII: no facial asymmetry  VIII: normal hearing to speech  MOTOR: 5/5 in both upper and lower extremities  REFLEXES: 2/4 throughout, bilateral flexor plantars  SENSORY: Normal to touch, temperature & pin prick in all extremiteis  COORD: Normal finger to nose and heel to shin, no tremor, no dysmetria  Gait:   NIHSS: **** ASPECT Score: ***** ICH Score: ****** (GCS)    LABS:                        12.9   3.77  )-----------( 247      ( 13 Sep 2022 05:30 )             39.8     09-    139  |  106  |  6<L>  ----------------------------<  89  3.8   |  23  |  0.82    Ca    9.0      13 Sep 2022 05:30  Phos  3.0     09-12  Mg     1.5         TPro  7.3  /  Alb  4.1  /  TBili  0.6  /  DBili  x   /  AST  17  /  ALT  13  /  AlkPhos  67  09-13    Hemoglobin A1C:   Vitamin B12   PT/INR - ( 11 Sep 2022 23:31 )   PT: 12.8 sec;   INR: 1.10          PTT - ( 11 Sep 2022 23:31 )  PTT:30.5 sec  CAPILLARY BLOOD GLUCOSE          Urinalysis Basic - ( 11 Sep 2022 23:31 )    Color: Yellow / Appearance: Clear / S.015 / pH: x  Gluc: x / Ketone: NEGATIVE  / Bili: NEGATIVE / Urobili: 1.0 E.U./dL   Blood: x / Protein: NEGATIVE mg/dL / Nitrite: NEGATIVE   Leuk Esterase: NEGATIVE / RBC: x / WBC x   Sq Epi: x / Non Sq Epi: x / Bacteria: x            Microbiology:    Culture - Urine (collected 11 Sep 2022 23:31)  Source: Clean Catch Clean Catch (Midstream)  Final Report (13 Sep 2022 07:25):    <10,000 CFU/mL Normal Urogenital Jeimy    Culture - Blood (collected 11 Sep 2022 23:31)  Source: .Blood Blood-Peripheral  Preliminary Report (13 Sep 2022 07:01):    No growth to date.    Culture - Blood (collected 11 Sep 2022 23:31)  Source: .Blood Blood-Peripheral  Preliminary Report (13 Sep 2022 07:01):    No growth to date.        RADIOLOGY, EKG AND ADDITIONAL TESTS: Reviewed.           NEUROLOGY CONSULT    HPI:  Previous MRN from [facility] : [id number]  MOST OF INFORMATION COLLECTED FROM PREVIOUS ADMISSION NOTES AND ED NOTES SINCE PATIENT REFUSED TO SPEAK AND DID NOT ANSWER ANY QUESTIONS AND REFUSED PHYSICAL EXAM.    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 Hunter), chronic back pain, concern for HIV myelopathy (not approved by ID yet on previous admission) and asthma, presenting with fluctuating LE weakness and urinary retention. He was admitted in early 2022 at [facility] where he had a normal MRI T/L spine and CSF serologies. At that time he declined discharge to [facility] and has instead been in a shelter for the past 2 weeks, getting progressively more weak w/urinary incontinence/retention. He also has been lost to f/u and off meds. In ED pt denied CP/SOB, fever/chills, No abd pain or n/v/c/d, though at times is incontinent of stool which he says has been on/off for years as with the urinary retention and LE weakness. He suffers from frequent LE muscle spasms for which he normally takes baclofen, lyrica, and gabapentin but not currently taking. In the ED patient did not report HA, Visual  sx, neck pain, UE complaints or sensory complaints.    [facility] admission records: (Early 2022):   Lower extremity weakness.    - MRI T/L spine: no cord/cauda equina compression; no abnl cord signal or enhancement  - Serologies neg.   - followed by ID, do not suspect 2/2 HIV myelopathy as is considered end stage disease of HIV, plus patient CD4 count and VL in good range   - followed by neurology and ID concern for HIV myelopathy.   - PT recommended [facility], patient decline wants outpatient PT  - MRI negative for acute pathologies      [facility] ED:  VS:   HR: 82 BP: 130/86 T: 97.7  RR: 15 /98  Labs: hb 12.6 , k 3.2, cr 0.95.   Imaging: ECG: HR 56, NSR , , QRS 92, QTc 380  Treatment: Baclofen 10mg, Gabapentin 300mg, Toradol IVP 15mg. Morphine 4mg, NS 1L (12 Sep 2022 07:47)     MEDICATIONS  Home Medications:  melatonin 5 mg oral tablet: 1 tab(s) orally once a day (at bedtime) (12 Sep 2022 10:42)    MEDICATIONS  (STANDING):  bictegravir 50 mG/emtricitabine 200 mG/tenofovir alafenamide 25 mG (BIKTARVY) 1 Tablet(s) Oral daily  enoxaparin Injectable 40 milliGRAM(s) SubCutaneous every 24 hours  influenza   Vaccine 0.5 milliLiter(s) IntraMuscular once  melatonin 5 milliGRAM(s) Oral at bedtime  pregabalin 50 milliGRAM(s) Oral three times a day  QUEtiapine 200 milliGRAM(s) Oral at bedtime    MEDICATIONS  (PRN):  acetaminophen     Tablet .. 650 milliGRAM(s) Oral every 6 hours PRN Temp greater or equal to 38C (100.4F), Mild Pain (1 - 3)      FAMILY HISTORY:  FH: HIV infection (Mother)      SOCIAL HISTORY: negative for tobacco, alcohol, or ilicit drug use.    Allergies    Ceclor (Unknown)  Toradol (Anaphylaxis; Swelling)  Toradol (Swelling)    Intolerances        GEN: NAD, pleasant, cooperative    NEURO:   MENTAL STATUS: AAOx3  LANG/SPEECH: Fluent, intact naming, repetition & comprehension  CRANIAL NERVES:  II: Pupils equal and reactive, no RAPD, normal visual field and fundus  III, IV, VI: EOM intact, no gaze preference or deviation  V: normal  VII: no facial asymmetry  VIII: normal hearing to speech  MOTOR: 5/5 in both upper, hip flexors, adductors and abductors 1/5, knee flexors 2/5, foot dorsiflexors, plantarflexors, inverters and evertors 1/5  REFLEXES: absent reflexes LE   SENSORY: Loss of touch, temperature, vibration and proprioception LE   COORD: Normal finger to nose and heel to shin, no tremor, no dysmetria      LABS:                        12.9   3.77  )-----------( 247      ( 13 Sep 2022 05:30 )             39.8     09-13    139  |  106  |  6<L>  ----------------------------<  89  3.8   |  23  |  0.82    Ca    9.0      13 Sep 2022 05:30  Phos  3.0     09-12  Mg     1.5     -    TPro  7.3  /  Alb  4.1  /  TBili  0.6  /  DBili  x   /  AST  17  /  ALT  13  /  AlkPhos  67  09-13    Hemoglobin A1C:   Vitamin B12   PT/INR - ( 11 Sep 2022 23:31 )   PT: 12.8 sec;   INR: 1.10          PTT - ( 11 Sep 2022 23:31 )  PTT:30.5 sec  CAPILLARY BLOOD GLUCOSE          Urinalysis Basic - ( 11 Sep 2022 23:31 )    Color: Yellow / Appearance: Clear / S.015 / pH: x  Gluc: x / Ketone: NEGATIVE  / Bili: NEGATIVE / Urobili: 1.0 E.U./dL   Blood: x / Protein: NEGATIVE mg/dL / Nitrite: NEGATIVE   Leuk Esterase: NEGATIVE / RBC: x / WBC x   Sq Epi: x / Non Sq Epi: x / Bacteria: x            Microbiology:    Culture - Urine (collected 11 Sep 2022 23:31)  Source: Clean Catch Clean Catch (Midstream)  Final Report (13 Sep 2022 07:25):    <10,000 CFU/mL Normal Urogenital Jeimy    Culture - Blood (collected 11 Sep 2022 23:31)  Source: .Blood Blood-Peripheral  Preliminary Report (13 Sep 2022 07:01):    No growth to date.    Culture - Blood (collected 11 Sep 2022 23:31)  Source: .Blood Blood-Peripheral  Preliminary Report (13 Sep 2022 07:01):    No growth to date.        RADIOLOGY, EKG AND ADDITIONAL TESTS: Reviewed.

## 2022-09-13 NOTE — OCCUPATIONAL THERAPY INITIAL EVALUATION ADULT - PERTINENT HX OF CURRENT PROBLEM, REHAB EVAL
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 Hunter), chronic back pain, concern for HIV myelopathy (not approved by ID yet ion previous admission) and asthma, presenting with fluctuating LE weakness and urinary retention transferred from [facility] pending dispo plan to [facility] Of note, Pt reports h/o GBS in 2019.

## 2022-09-13 NOTE — CONSULT NOTE ADULT - ATTENDING COMMENTS
new severe weakness of unclear etiology, HIV myelopathy vs neuropathy    if patient agrees, I will perform EMG tomorrow   MRI brain and c spine w and w/o

## 2022-09-13 NOTE — CONSULT NOTE ADULT - ASSESSMENT
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 Hunter), chronic back pain, concern for HIV myelopathy (not approved by ID yet on previous admission) and asthma, night terrors ( seroquel 200mg qHS and Seroquel 100mg qAM) presenting with fluctuating LE weakness and urinary incontinence. Previous work up for LE weakness of toes to hips, 2-3 days bowel/bladder incontinence, lower back pain, muscle spasms in lumbar back. at [facility]:  MRI T/L spine: no cord/cauda equina compression; no abnl cord signal or enhancement, Serologies neg, followed by ID, do not suspect 2/2 HIV myelopathy as is considered end stage disease of HIV, plus patient CD4 count and VL in good range. PT recommended [facility], patient decline wants outpatient PT. MRI negative for cord/cauda equina compression; no abnl cord signal or enhancement. Today the patient stated that he was able to carry out ADLs by himself until 1 week ago when he started with worsening LE weakness and could not get up from bed. Neurology consulted for LE weakness work up.    Recommendations:  - pending discussion with the attending    Pt is 33 y/o M  congenital HIV (CD4 count 335,  early September 2022 on Biktarvy,  following with ID, Dr. Hunter), chronic back pain, concern for HIV myelopathy (not approved by ID yet on previous admission) and asthma, night terrors ( seroquel 200mg qHS and Seroquel 100mg qAM) presenting with fluctuating LE weakness and urinary incontinence. Previous work up for LE weakness of toes to hips, 2-3 days bowel/bladder incontinence, lower back pain, muscle spasms in lumbar back. at [facility]:  MRI T/L spine: no cord/cauda equina compression; no abnl cord signal or enhancement, Serologies neg,  PT recommended [facility], patient decline wants outpatient PT. MRI negative for cord/cauda equina compression; no abnl cord signal or enhancement. Today the patient stated that he was able to carry out ADLs by himself until 1 week ago when he started with worsening LE weakness and could not get up from bed. Neurology consulted for LE weakness work up. Highly likely that patient may have HIV myelopathy     Recommendations:  - EMG tomorrow at 12pm with Dr. Velázquez (make sure that the patient is in the room at that time)   - MR head and cervical spine w/wo contrast   - Neurology will continue to follow. Please call for any change in neurological status

## 2022-09-14 LAB
ALBUMIN SERPL ELPH-MCNC: 4 G/DL — SIGNIFICANT CHANGE UP (ref 3.3–5)
ALP SERPL-CCNC: 65 U/L — SIGNIFICANT CHANGE UP (ref 40–120)
ALT FLD-CCNC: 11 U/L — SIGNIFICANT CHANGE UP (ref 10–45)
ANION GAP SERPL CALC-SCNC: 9 MMOL/L — SIGNIFICANT CHANGE UP (ref 5–17)
AST SERPL-CCNC: 14 U/L — SIGNIFICANT CHANGE UP (ref 10–40)
BILIRUB SERPL-MCNC: 0.3 MG/DL — SIGNIFICANT CHANGE UP (ref 0.2–1.2)
BUN SERPL-MCNC: 12 MG/DL — SIGNIFICANT CHANGE UP (ref 7–23)
CALCIUM SERPL-MCNC: 9.1 MG/DL — SIGNIFICANT CHANGE UP (ref 8.4–10.5)
CHLORIDE SERPL-SCNC: 104 MMOL/L — SIGNIFICANT CHANGE UP (ref 96–108)
CO2 SERPL-SCNC: 23 MMOL/L — SIGNIFICANT CHANGE UP (ref 22–31)
CREAT SERPL-MCNC: 0.9 MG/DL — SIGNIFICANT CHANGE UP (ref 0.5–1.3)
EGFR: 116 ML/MIN/1.73M2 — SIGNIFICANT CHANGE UP
GLUCOSE SERPL-MCNC: 88 MG/DL — SIGNIFICANT CHANGE UP (ref 70–99)
HCT VFR BLD CALC: 39.7 % — SIGNIFICANT CHANGE UP (ref 39–50)
HGB BLD-MCNC: 13 G/DL — SIGNIFICANT CHANGE UP (ref 13–17)
MAGNESIUM SERPL-MCNC: 1.6 MG/DL — SIGNIFICANT CHANGE UP (ref 1.6–2.6)
MCHC RBC-ENTMCNC: 29.4 PG — SIGNIFICANT CHANGE UP (ref 27–34)
MCHC RBC-ENTMCNC: 32.7 GM/DL — SIGNIFICANT CHANGE UP (ref 32–36)
MCV RBC AUTO: 89.8 FL — SIGNIFICANT CHANGE UP (ref 80–100)
NRBC # BLD: 0 /100 WBCS — SIGNIFICANT CHANGE UP (ref 0–0)
PHOSPHATE SERPL-MCNC: 3.6 MG/DL — SIGNIFICANT CHANGE UP (ref 2.5–4.5)
PLATELET # BLD AUTO: 259 K/UL — SIGNIFICANT CHANGE UP (ref 150–400)
POTASSIUM SERPL-MCNC: 4.1 MMOL/L — SIGNIFICANT CHANGE UP (ref 3.5–5.3)
POTASSIUM SERPL-SCNC: 4.1 MMOL/L — SIGNIFICANT CHANGE UP (ref 3.5–5.3)
PROT SERPL-MCNC: 7.3 G/DL — SIGNIFICANT CHANGE UP (ref 6–8.3)
RBC # BLD: 4.42 M/UL — SIGNIFICANT CHANGE UP (ref 4.2–5.8)
RBC # FLD: 13.3 % — SIGNIFICANT CHANGE UP (ref 10.3–14.5)
SODIUM SERPL-SCNC: 136 MMOL/L — SIGNIFICANT CHANGE UP (ref 135–145)
WBC # BLD: 2.96 K/UL — LOW (ref 3.8–10.5)
WBC # FLD AUTO: 2.96 K/UL — LOW (ref 3.8–10.5)

## 2022-09-14 PROCEDURE — 99232 SBSQ HOSP IP/OBS MODERATE 35: CPT

## 2022-09-14 PROCEDURE — 95908 NRV CNDJ TST 3-4 STUDIES: CPT | Mod: 26

## 2022-09-14 PROCEDURE — 99232 SBSQ HOSP IP/OBS MODERATE 35: CPT | Mod: GC

## 2022-09-14 PROCEDURE — 95885 MUSC TST DONE W/NERV TST LIM: CPT | Mod: 26

## 2022-09-14 RX ORDER — MAGNESIUM SULFATE 500 MG/ML
2 VIAL (ML) INJECTION ONCE
Refills: 0 | Status: DISCONTINUED | OUTPATIENT
Start: 2022-09-14 | End: 2022-09-14

## 2022-09-14 RX ADMIN — Medication 10 MILLIGRAM(S): at 18:56

## 2022-09-14 RX ADMIN — Medication 50 MILLIGRAM(S): at 22:05

## 2022-09-14 RX ADMIN — BICTEGRAVIR SODIUM, EMTRICITABINE, AND TENOFOVIR ALAFENAMIDE FUMARATE 1 TABLET(S): 30; 120; 15 TABLET ORAL at 13:11

## 2022-09-14 RX ADMIN — Medication 50 MILLIGRAM(S): at 06:59

## 2022-09-14 RX ADMIN — QUETIAPINE FUMARATE 200 MILLIGRAM(S): 200 TABLET, FILM COATED ORAL at 22:05

## 2022-09-14 RX ADMIN — Medication 5 MILLIGRAM(S): at 22:05

## 2022-09-14 RX ADMIN — Medication 50 MILLIGRAM(S): at 13:12

## 2022-09-14 NOTE — PROGRESS NOTE ADULT - PROBLEM SELECTOR PLAN 5
F: None  E: Hold electrolyte repletion  Diet: regular  Dvt ppx: Lovenox  GI ppx: None  Code: Full  Dispo: [facility]
F: s/p I L NS  E: Replete as needed  Diet: regular  Dvt ppx: Lovenox  GI ppx: None  Code: Full  Dispo: [facility]

## 2022-09-14 NOTE — PROGRESS NOTE ADULT - PROBLEM SELECTOR PLAN 4
Baseline hb ~12. on arrival hb 12.6, MCV 91.9. Normocytic anemia likely due to chronic disease. no active bleeding.   -Maintain active T&S  -Blood transfusion hb<7
Baseline hb ~12. on arrival hb 12.6, MCV 91.9. Normocytic anemia likely due to chronic disease. no active bleeding.   -Maintain active T&S  -Blood transfusion hb<7

## 2022-09-14 NOTE — PROGRESS NOTE ADULT - ASSESSMENT
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 Hunter), chronic back pain, concern for HIV myelopathy (not approved by ID yet ion previous admission) and asthma, presenting with fluctuating LE weakness and urinary retention transferred from [facility] pending dispo plan to [facility].       Problem/Plan - 1:  ·  Problem: Lower extremity weakness.   ·  Plan: Presented to the [facility] with LE weakness. Had extensive work up at [facility] end of August early Sept 2022. Extensive imaging done  From previous admission notes:  congenital HIV, chronic back pain, substance use, ?HIV myelopathy,  fluctuations in worsening lower extremity weakness and urinary retention. He was seen by neurology here in 8/9/22 for 3 days of LE weakness of toes to hips, 2-3 days bowel/bladder incontinence, lower back pain, muscle spasms in lumbar back. Did not have saddle anesthesia then or now. Takes baclofen, gabapentin, lyrica. (Off from meds from September) Has been seen multiple times in past by neurology for similar complaints of b/l LE weakness. Prior workup was not c/w GBS but potentially myelopathy 2/2 HIV infection (Neurology was concern for HIV myelopathy but ID was not concern for HIV myelopathy. ) Has hx of normal LP and normal spine MRI's. MRI lumbar spines (6/5/21, 5/21/21, 5/2/21, 10/26/20). MRI cervical spine (5/2/21), MRI thoracic spine (5/2/21, 10/26/20), MRI head (3/25/20). HIE notes from other systems report EMG/NCS in 2021 as being normal. .   - neurology  consult  -C/w home meds, Baclofen 10mg, pregabalin 50 milliGRAM(s) Oral three times a day  -Pending [facility] placement  -F/u PT/OT.    Problem/Plan - 2:  ·  Problem: HIV disease.   ·  Plan: - Patient with congenital HIV, following with ID, Dr. Hunter. Previously CD4 count 335,   - c/w Biktarvy.    Problem/Plan - 3:  ·  Problem: Night terrors.   ·  Plan: Reported history of night terrors as well as possible PTSD. Seen by BH during previous hospitalization  - c/w home seroquel 200mg bedtime, d/braxton AM seroquel due to patient drowsiness.    Problem/Plan - 4:  ·  Problem: Anemia.   ·  Plan: Baseline hb ~12. on arrival hb 12.6, MCV 91.9. Normocytic anemia likely due to chronic disease. no active bleeding.   -Maintain active T&S  -Blood transfusion hb<7.    Problem/Plan - 5:  ·  Problem: Healthcare maintenance.   ·  Plan: F: s/p I L NS  E: Replete as needed  Diet: regular  Dvt ppx: Lovenox  GI ppx: None  Code: Full  Dispo: F.
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 Hunter), chronic back pain, concern for HIV myelopathy (not approved by ID yet on previous admission) and asthma, night terrors ( seroquel 200mg qHS and Seroquel 100mg qAM) presenting with fluctuating LE weakness and urinary incontinence. Previous work up for LE weakness of toes to hips, 2-3 days bowel/bladder incontinence, lower back pain, muscle spasms in lumbar back. at [facility]:  MRI T/L spine: no cord/cauda equina compression; no abnl cord signal or enhancement, Serologies neg,  PT recommended [facility], patient decline wants outpatient PT. MRI negative for cord/cauda equina compression; no abnl cord signal or enhancement. Today the patient stated that he was able to carry out ADLs by himself until 1 week ago when he started with worsening LE weakness and could not get up from bed. Neurology consulted for LE weakness work up. Highly likely that patient may have HIV myelopathy. EMG LE with Dr. Velázquez wn.    Recommendations:  - MR head and cervical spine w/wo contrast   - vEEG to access any seizure and post ictal etiology  - Patient will be transferred to neurology service 
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 Hunter), chronic back pain, concern for HIV myelopathy (not approved by ID yet ion previous admission) and asthma, presenting with fluctuating LE weakness and urinary retention transferred from [facility] pending dispo plan to [facility]. 
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 Hunter), chronic back pain, concern for HIV myelopathy (not approved by ID yet ion previous admission) and asthma, presenting with fluctuating LE weakness and urinary retention transferred from [facility] pending dispo plan to [facility].

## 2022-09-14 NOTE — PROCEDURE NOTE - ADDITIONAL PROCEDURE DETAILS
EMG/NCS performed on the left leg.     The left sural and superficial SNAPs were normal.  The left peroneal and tibial CMAPs were normal.  EMG of the left TA and gastrocnemius were normal.     The study was normal, showing no electrical correlate for left leg weakness.    Full report with tables to be uploaded separately

## 2022-09-14 NOTE — PROGRESS NOTE ADULT - SUBJECTIVE AND OBJECTIVE BOX
Physical Medicine and Rehabilitation Progress Note :      Patient is a 32y old  Male who presents with a chief complaint of Lower extremities weakness (14 Sep 2022 12:08)      HPI:  Previous MRN from [facility] : [id number]  MOST OF INFORMATION COLLECTED FROM PREVIOUS ADMISSION NOTES AND ED NOTES SINCE PATIENT REFUSED TO SPEAK AND DID NOT ANSWER ANY QUESTIONS AND REFUSED PHYSICAL EXAM.    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 He was admitted in early Sept 2022 at [facility] where he had a normal MRI T/L spine and CSF serologies. At that time he declined discharge to [facility] and has instead been in a shelter for the past 2 weeks, getting progressively more weak w/urinary incontinence/retention. He also has been lost to f/u and off meds. In ED pt denied CP/SOB, fever/chills, No abd pain or n/v/c/d, though at times is incontinent of stool which he says has been on/off for years as with the urinary retention and LE weakness. He suffers from frequent LE muscle spasms for which he normally takes baclofen, lyrica, and gabapentin but not currently taking. In the ED patient did not report HA, Visual  sx, neck pain, UE complaints or sensory complaints.    [facility] admission records: (Early September 2022):   Lower extremity weakness.    - MRI T/L spine: no cord/cauda equina compression; no abnl cord signal or enhancement  - Serologies neg.   - followed by ID, do not suspect 2/2 HIV myelopathy as is considered end stage disease of HIV, plus patient CD4 count and VL in good range   - followed by neurology and ID concern for HIV myelopathy.   - PT recommended [facility], patient decline wants outpatient PT  - MRI negative for acute pathologies      [facility] ED:  VS:   HR: 82 BP: 130/86 T: 97.7  RR: 15 /98  Labs: hb 12.6 , k 3.2, cr 0.95.   Imaging: ECG: HR 56, NSR , , QRS 92, QTc 380  Treatment: Baclofen 10mg, Gabapentin 300mg, Toradol IVP 15mg. Morphine 4mg, NS 1L (12 Sep 2022 07:47)                            13.0   2.96  )-----------( 259      ( 14 Sep 2022 09:43 )             39.7       09-14    136  |  104  |  12  ----------------------------<  88  4.1   |  23  |  0.90    Ca    9.1      14 Sep 2022 09:43  Phos  3.6     09-14  Mg     1.6     09-14    TPro  7.3  /  Alb  4.0  /  TBili  0.3  /  DBili  x   /  AST  14  /  ALT  11  /  AlkPhos  65  09-14    Vital Signs Last 24 Hrs  T(C): 36.8 (14 Sep 2022 09:00), Max: 37.1 (13 Sep 2022 20:55)  T(F): 98.2 (14 Sep 2022 09:00), Max: 98.7 (13 Sep 2022 20:55)  HR: 57 (14 Sep 2022 09:00) (54 - 61)  BP: 102/65 (14 Sep 2022 09:00) (102/65 - 127/76)  BP(mean): --  RR: 17 (14 Sep 2022 09:00) (16 - 18)  SpO2: 98% (14 Sep 2022 09:00) (98% - 99%)    Parameters below as of 14 Sep 2022 09:00  Patient On (Oxygen Delivery Method): room air        MEDICATIONS  (STANDING):  bictegravir 50 mG/emtricitabine 200 mG/tenofovir alafenamide 25 mG (BIKTARVY) 1 Tablet(s) Oral daily  enoxaparin Injectable 40 milliGRAM(s) SubCutaneous every 24 hours  influenza   Vaccine 0.5 milliLiter(s) IntraMuscular once  melatonin 5 milliGRAM(s) Oral at bedtime  pregabalin 50 milliGRAM(s) Oral three times a day  QUEtiapine 200 milliGRAM(s) Oral at bedtime    MEDICATIONS  (PRN):  acetaminophen     Tablet .. 650 milliGRAM(s) Oral every 6 hours PRN Temp greater or equal to 38C (100.4F), Mild Pain (1 - 3)  diazepam    Tablet 10 milliGRAM(s) Oral once PRN MRI sedation      Physical Therapy Functional Status Assessment :   9/13/2022      Therapeutic Interventions      Bed Mobility  Bed Mobility Training Rehab Potential: fair, will monitor progress closely  Bed Mobility Training Symptoms Noted During/After Treatment: fatigue  Bed Mobility Training Rolling/Turning: supervsion  Bed Mobility Training Scooting: supervsion  Bed Mobility Training Sit-to-Supine: supervsion  Bed Mobility Training Supine-to-Sit: supervsion  Bed Mobility Training Limitations: decreased strength    Sit-Stand Transfer Training  Sit-to-Stand Transfer Training Rehab Potential: fair, will monitor progress closely  Sit-to-Stand Transfer Training Symptoms Noted During/After Treatment: fatigue  Transfer Training Sit-to-Stand Transfer: moderate assist (50% patient effort);  1 person assist;  nonverbal cues (demo/gestures);  verbal cues;  rolling walker  Transfer Training Stand-to-Sit Transfer: contact guard;  verbal cues;  nonverbal cues (demo/gestures);  1 person assist;  rolling walker  Sit-to-Stand Transfer Training Transfer Safety Analysis: decreased balance;  decreased strength;  impaired balance    Gait Training  Gait Training Rehab Potential: fair, will monitor progress closely  Gait Training Symptoms Noted During/After Treatment: fatigue  Gait Training: minimum assist (75% patient effort);  moderate assist (50% patient effort);  2 person assist;  rolling walker;  15 feet;  x2  Gait Analysis: decreased cabrera;  ataxic;  decreased hip/knee flexion;  shuffling;  decreased step length;  decreased toe clearance;  decreased weight-shifting ability;  dec B DF /hip flexion, scissoring;  decreased strength;  impaired balance;  impaired coordination;  impaired motor control;  impaired postural control    Therapeutic Exercise  Therapeutic Exercise Detail: seated therapeutic exercise: B long arc quads x 5 reps, B marching x 3 reps             PM&R Impression : as above    Current Disposition Plan Recommendations :    subacute rehab placement          
Neurology Progress Note    Interval History:  Patient was seen and examined at bedside. He was lying comfortably in bed and did not appear in distress. He did mention that his weakness was a somewhat better than yesterday. He denied any chest pain, SOB, abd pain, N/V/C/D, cough or fever.       Medications:  acetaminophen     Tablet .. 650 milliGRAM(s) Oral every 6 hours PRN  bictegravir 50 mG/emtricitabine 200 mG/tenofovir alafenamide 25 mG (BIKTARVY) 1 Tablet(s) Oral daily  enoxaparin Injectable 40 milliGRAM(s) SubCutaneous every 24 hours  influenza   Vaccine 0.5 milliLiter(s) IntraMuscular once  melatonin 5 milliGRAM(s) Oral at bedtime  pregabalin 50 milliGRAM(s) Oral three times a day  QUEtiapine 200 milliGRAM(s) Oral at bedtime      Vital Signs Last 24 Hrs  T(C): 36.6 (15 Sep 2022 04:54), Max: 37 (14 Sep 2022 20:56)  T(F): 97.9 (15 Sep 2022 04:54), Max: 98.6 (14 Sep 2022 20:56)  HR: 73 (15 Sep 2022 04:54) (57 - 73)  BP: 115/75 (15 Sep 2022 04:54) (102/65 - 136/93)  BP(mean): --  RR: 18 (15 Sep 2022 04:54) (17 - 18)  SpO2: 99% (15 Sep 2022 04:54) (98% - 100%)    Parameters below as of 15 Sep 2022 04:54  Patient On (Oxygen Delivery Method): room air        Neurological Examination:  General:  Appearance is consistent with chronologic age.  No abnormal facies.  Gross skin survey within normal limits.    Cognitive/Language:  Awake, alert, and oriented to person, place, time and date.  Recent and remote memory intact.  Fund of knowledge is appropriate.  Naming, repetition and comprehension intact.   Nondysarthric.    Cranial Nerves  - Eyes: Visual acuity intact, Visual fields full.  EOMI w/o nystagmus, skew or reported double vision.  PERRL.  No ptosis/weakness of eyelid closure.    - Face:  Facial sensation normal V1 - 3, no facial asymmetry.    - Ears/Nose/Throat:  Hearing grossly intact b/l to finger rub.  Palate elevates midline.  Tongue and uvula midline.   Motor examination:  Upper Extremities: L 5/5, R 5/5; Lower extremities: L 5/5, R 5/5.    Sensory examination:   Intact to light touch and pinprick, pain, temperature and proprioception and vibration in all extremities.  Reflexes:   2+ b/l biceps, triceps, brachioradialis, patella and achilles.   Cerebellum:   FTN/HKS intact.  No dysmetria or dysdiadokinesia.      Labs:  CBC Full  -  ( 14 Sep 2022 09:43 )  WBC Count : 2.96 K/uL  RBC Count : 4.42 M/uL  Hemoglobin : 13.0 g/dL  Hematocrit : 39.7 %  Platelet Count - Automated : 259 K/uL  Mean Cell Volume : 89.8 fl  Mean Cell Hemoglobin : 29.4 pg  Mean Cell Hemoglobin Concentration : 32.7 gm/dL  Auto Neutrophil # : x  Auto Lymphocyte # : x  Auto Monocyte # : x  Auto Eosinophil # : x  Auto Basophil # : x  Auto Neutrophil % : x  Auto Lymphocyte % : x  Auto Monocyte % : x  Auto Eosinophil % : x  Auto Basophil % : x    09-14    136  |  104  |  12  ----------------------------<  88  4.1   |  23  |  0.90    Ca    9.1      14 Sep 2022 09:43  Phos  3.6     09-14  Mg     1.6     09-14    TPro  7.3  /  Alb  4.0  /  TBili  0.3  /  DBili  x   /  AST  14  /  ALT  11  /  AlkPhos  65  09-14    LIVER FUNCTIONS - ( 14 Sep 2022 09:43 )  Alb: 4.0 g/dL / Pro: 7.3 g/dL / ALK PHOS: 65 U/L / ALT: 11 U/L / AST: 14 U/L / GGT: x               
O/N Events: Patient admitted overnight for referral to [facility].     Subjective/ROS: Patient seen and examined at bedside. Patient states that he is feeling fine without any specific complaints or concerns. He continues to report weakness in his bilateral lower extremities.     Denies Fever/Chills, HA, CP, SOB, n/v, changes in bowel/urinary habits.  12pt ROS otherwise negative.    VITALS  Vital Signs Last 24 Hrs  T(C): 37.1 (13 Sep 2022 20:55), Max: 37.1 (13 Sep 2022 20:55)  T(F): 98.7 (13 Sep 2022 20:55), Max: 98.7 (13 Sep 2022 20:55)  HR: 54 (13 Sep 2022 20:55) (54 - 64)  BP: 127/76 (13 Sep 2022 20:55) (111/62 - 130/81)  BP(mean): --  RR: 18 (13 Sep 2022 20:55) (16 - 18)  SpO2: 98% (13 Sep 2022 20:55) (97% - 99%)    Parameters below as of 13 Sep 2022 20:55  Patient On (Oxygen Delivery Method): room air        CAPILLARY BLOOD GLUCOSE          PHYSICAL EXAM  General: NAD  Head: NC/AT; MMM; PERRL; EOMI;  Neck: Supple; no JVD  Respiratory: CTAB; no wheezes/rales/rhonchi  Cardiovascular: Regular rhythm/rate; S1/S2+, no murmurs, rubs gallops   Gastrointestinal: Soft; NTND; bowel sounds normal and present  Extremities: Unable to assess extremity strength due to patient refusal  Neurological: A&Ox3, CNII-XII grossly intact; no obvious focal deficits    MEDICATIONS  (STANDING):  bictegravir 50 mG/emtricitabine 200 mG/tenofovir alafenamide 25 mG (BIKTARVY) 1 Tablet(s) Oral daily  enoxaparin Injectable 40 milliGRAM(s) SubCutaneous every 24 hours  influenza   Vaccine 0.5 milliLiter(s) IntraMuscular once  melatonin 5 milliGRAM(s) Oral at bedtime  pregabalin 50 milliGRAM(s) Oral three times a day  QUEtiapine 200 milliGRAM(s) Oral at bedtime    MEDICATIONS  (PRN):  acetaminophen     Tablet .. 650 milliGRAM(s) Oral every 6 hours PRN Temp greater or equal to 38C (100.4F), Mild Pain (1 - 3)  diazepam    Tablet 10 milliGRAM(s) Oral once PRN MRI sedation      Ceclor (Unknown)  Toradol (Anaphylaxis; Swelling)  Toradol (Swelling)      LABS                        12.9   3.77  )-----------( 247      ( 13 Sep 2022 05:30 )             39.8     09-13    139  |  106  |  6<L>  ----------------------------<  89  3.8   |  23  |  0.82    Ca    9.0      13 Sep 2022 05:30  Phos  3.0     09-12  Mg     1.5     -    TPro  7.3  /  Alb  4.1  /  TBili  0.6  /  DBili  x   /  AST  17  /  ALT  13  /  AlkPhos  67  09-13    PT/INR - ( 11 Sep 2022 23:31 )   PT: 12.8 sec;   INR: 1.10          PTT - ( 11 Sep 2022 23:31 )  PTT:30.5 sec  Urinalysis Basic - ( 11 Sep 2022 23:31 )    Color: Yellow / Appearance: Clear / S.015 / pH: x  Gluc: x / Ketone: NEGATIVE  / Bili: NEGATIVE / Urobili: 1.0 E.U./dL   Blood: x / Protein: NEGATIVE mg/dL / Nitrite: NEGATIVE   Leuk Esterase: NEGATIVE / RBC: x / WBC x   Sq Epi: x / Non Sq Epi: x / Bacteria: x              IMAGING/EKG/ETC  
O/N Events: No acute events overnight. Patient did not obtain MRI brain c-spine.    Subjective/ROS: Patient seen and examined at bedside. Patient says he is tired this morning and did not engage with interview/physical exam.    Denies Fever/Chills, HA, CP, SOB, n/v, changes in bowel/urinary habits.  12pt ROS otherwise negative.    VITALS  Vital Signs Last 24 Hrs  T(C): 36.8 (14 Sep 2022 15:15), Max: 37.1 (13 Sep 2022 20:55)  T(F): 98.2 (14 Sep 2022 15:15), Max: 98.7 (13 Sep 2022 20:55)  HR: 70 (14 Sep 2022 15:15) (54 - 70)  BP: 133/83 (14 Sep 2022 15:15) (102/65 - 133/83)  BP(mean): --  RR: 18 (14 Sep 2022 15:15) (16 - 18)  SpO2: 99% (14 Sep 2022 15:15) (98% - 99%)    Parameters below as of 14 Sep 2022 15:15  Patient On (Oxygen Delivery Method): room air      CAPILLARY BLOOD GLUCOSE      PHYSICAL EXAM  General: NAD  Head: NC/AT; MMM; PERRL; EOMI;  Neck: Supple; no JVD  Respiratory: CTAB; no wheezes/rales/rhonchi  Cardiovascular: Regular rhythm/rate; S1/S2+, no murmurs, rubs gallops   Gastrointestinal: Soft; NTND; bowel sounds normal and present  Extremities: Bilateral lower extremity strength 3/5 throughout, 1+ pateller DTRs bilaterally  Neurological: A&Ox3, CNII-XII grossly intact    MEDICATIONS  (STANDING):  bictegravir 50 mG/emtricitabine 200 mG/tenofovir alafenamide 25 mG (BIKTARVY) 1 Tablet(s) Oral daily  enoxaparin Injectable 40 milliGRAM(s) SubCutaneous every 24 hours  influenza   Vaccine 0.5 milliLiter(s) IntraMuscular once  melatonin 5 milliGRAM(s) Oral at bedtime  pregabalin 50 milliGRAM(s) Oral three times a day  QUEtiapine 200 milliGRAM(s) Oral at bedtime    MEDICATIONS  (PRN):  acetaminophen     Tablet .. 650 milliGRAM(s) Oral every 6 hours PRN Temp greater or equal to 38C (100.4F), Mild Pain (1 - 3)      Ceclor (Unknown)  Toradol (Anaphylaxis; Swelling)  Toradol (Swelling)      LABS                        13.0   2.96  )-----------( 259      ( 14 Sep 2022 09:43 )             39.7     09-14    136  |  104  |  12  ----------------------------<  88  4.1   |  23  |  0.90    Ca    9.1      14 Sep 2022 09:43  Phos  3.6     09-14  Mg     1.6     09-14    TPro  7.3  /  Alb  4.0  /  TBili  0.3  /  DBili  x   /  AST  14  /  ALT  11  /  AlkPhos  65  09-14                IMAGING/EKG/ETC

## 2022-09-14 NOTE — PROGRESS NOTE ADULT - ATTENDING COMMENTS
C/w Neuro workup - transfer to their service. Acceptance to [facility] is pending
previous dx of HIV myelopathy appears likely based on UMN signs on exam, but peculiar how his profound weakness on presentation improved overnight.  The only intervention was repletion of K, Mg, Ca.  need to consider episodic neurologic diseases including seizure, periodic parlysis.  Also will send 24 hour urine collection for porphyria.      vEEG, 24 hours  MRI brain w and w/o  24 hour urine porphobilinogen  if weakness worsens will check stat lytes and then administer K to see if there is a benefit  PT/OT  will transfer to neurology service

## 2022-09-14 NOTE — PROGRESS NOTE ADULT - PROBLEM SELECTOR PLAN 3
Reported history of night terrors as well as possible PTSD. Seen by BH during previous hospitalization  - c/w home seroquel 200mg bedtime, d/braxton AM seroquel due to patient drowsiness
Reported history of night terrors as well as possible PTSD. Seen by BH during previous hospitalization  - c/w home seroquel 200mg bedtime, d/braxton AM seroquel due to patient drowsiness

## 2022-09-14 NOTE — PROGRESS NOTE ADULT - PROBLEM SELECTOR PLAN 2
- Patient with congenital HIV, following with ID, Dr. Hunter. Previously CD4 count 335,   - c/w Biktarvy
- Patient with congenital HIV, following with ID, Dr. Hunter. Previously CD4 count 335,   - c/w Biktarvy

## 2022-09-14 NOTE — PROGRESS NOTE ADULT - PROBLEM SELECTOR PLAN 1
Presented to the [facility] with LE weakness. Had extensive work up at [facility] end of August early Sept 2022. Extensive imaging done  From previous admission notes:  congenital HIV, chronic back pain, substance use, ?HIV myelopathy,  fluctuations in worsening lower extremity weakness and urinary retention. He was seen by neurology here in 8/9/22 for 3 days of LE weakness of toes to hips, 2-3 days bowel/bladder incontinence, lower back pain, muscle spasms in lumbar back. Did not have saddle anesthesia then or now. Takes baclofen, gabapentin, lyrica. (Off from meds from September) Has been seen multiple times in past by neurology for similar complaints of b/l LE weakness. Prior workup was not c/w GBS but potentially myelopathy 2/2 HIV infection (Neurology was concern for HIV myelopathy but ID was not concern for HIV myelopathy. ) Has hx of normal LP and normal spine MRI's. MRI lumbar spines (6/5/21, 5/21/21, 5/2/21, 10/26/20). MRI cervical spine (5/2/21), MRI thoracic spine (5/2/21, 10/26/20), MRI head (3/25/20). HIE notes from other systems report EMG/NCS in 2021 as being normal. .   - neurology  consult  -C/w home meds, Baclofen 10mg, pregabalin 50 milliGRAM(s) Oral three times a day  -Pending [facility] placement  -F/u PT/OT
Presented to the [facility] with LE weakness. Had extensive work up at [facility] end of August early Sept 2022. Extensive imaging done  From previous admission notes:  congenital HIV, chronic back pain, substance use, ?HIV myelopathy,  fluctuations in worsening lower extremity weakness and urinary retention. He was seen by neurology here in 8/9/22 for 3 days of LE weakness of toes to hips, 2-3 days bowel/bladder incontinence, lower back pain, muscle spasms in lumbar back. Did not have saddle anesthesia then or now. Takes baclofen, gabapentin, lyrica. (Off from meds from September) Has been seen multiple times in past by neurology for similar complaints of b/l LE weakness. Prior workup was not c/w GBS but potentially myelopathy 2/2 HIV infection (Neurology was concern for HIV myelopathy but ID was not concern for HIV myelopathy. ) Has hx of normal LP and normal spine MRI's. MRI lumbar spines (6/5/21, 5/21/21, 5/2/21, 10/26/20). MRI cervical spine (5/2/21), MRI thoracic spine (5/2/21, 10/26/20), MRI head (3/25/20). HIE notes from other systems report EMG/NCS in 2021 as being normal. .   - neurology following, recommending MR brain/c-spine, and EMG  -C/w home meds, Baclofen 10mg, pregabalin 50 milliGRAM(s) Oral three times a day  -Pending [facility] placement  -F/u PT/OT

## 2022-09-15 ENCOUNTER — TRANSCRIPTION ENCOUNTER (OUTPATIENT)
Age: 32
End: 2022-09-15

## 2022-09-15 ENCOUNTER — INPATIENT (INPATIENT)
Facility: HOSPITAL | Age: 32
LOS: 0 days | Discharge: LEFT BEFORE DISCHARGE | End: 2022-09-16
Attending: HOSPITALIST | Admitting: HOSPITALIST

## 2022-09-15 VITALS
OXYGEN SATURATION: 98 % | SYSTOLIC BLOOD PRESSURE: 121 MMHG | TEMPERATURE: 98 F | RESPIRATION RATE: 18 BRPM | HEART RATE: 71 BPM | DIASTOLIC BLOOD PRESSURE: 73 MMHG

## 2022-09-15 VITALS
SYSTOLIC BLOOD PRESSURE: 122 MMHG | OXYGEN SATURATION: 97 % | DIASTOLIC BLOOD PRESSURE: 78 MMHG | HEIGHT: 72 IN | RESPIRATION RATE: 17 BRPM | HEART RATE: 92 BPM

## 2022-09-15 DIAGNOSIS — Z98.890 OTHER SPECIFIED POSTPROCEDURAL STATES: Chronic | ICD-10-CM

## 2022-09-15 PROCEDURE — 86901 BLOOD TYPING SEROLOGIC RH(D): CPT

## 2022-09-15 PROCEDURE — 84100 ASSAY OF PHOSPHORUS: CPT

## 2022-09-15 PROCEDURE — 99285 EMERGENCY DEPT VISIT HI MDM: CPT

## 2022-09-15 PROCEDURE — 86359 T CELLS TOTAL COUNT: CPT

## 2022-09-15 PROCEDURE — 86360 T CELL ABSOLUTE COUNT/RATIO: CPT

## 2022-09-15 PROCEDURE — 80053 COMPREHEN METABOLIC PANEL: CPT

## 2022-09-15 PROCEDURE — 85025 COMPLETE CBC W/AUTO DIFF WBC: CPT

## 2022-09-15 PROCEDURE — 81003 URINALYSIS AUTO W/O SCOPE: CPT

## 2022-09-15 PROCEDURE — 95700 EEG CONT REC W/VID EEG TECH: CPT

## 2022-09-15 PROCEDURE — 86850 RBC ANTIBODY SCREEN: CPT

## 2022-09-15 PROCEDURE — 97110 THERAPEUTIC EXERCISES: CPT

## 2022-09-15 PROCEDURE — 87536 HIV-1 QUANT&REVRSE TRNSCRPJ: CPT

## 2022-09-15 PROCEDURE — 96374 THER/PROPH/DIAG INJ IV PUSH: CPT

## 2022-09-15 PROCEDURE — 0225U NFCT DS DNA&RNA 21 SARSCOV2: CPT

## 2022-09-15 PROCEDURE — 36415 COLL VENOUS BLD VENIPUNCTURE: CPT

## 2022-09-15 PROCEDURE — 99285 EMERGENCY DEPT VISIT HI MDM: CPT | Mod: 25

## 2022-09-15 PROCEDURE — 85610 PROTHROMBIN TIME: CPT

## 2022-09-15 PROCEDURE — 86900 BLOOD TYPING SEROLOGIC ABO: CPT

## 2022-09-15 PROCEDURE — 83605 ASSAY OF LACTIC ACID: CPT

## 2022-09-15 PROCEDURE — 97161 PT EVAL LOW COMPLEX 20 MIN: CPT

## 2022-09-15 PROCEDURE — 83735 ASSAY OF MAGNESIUM: CPT

## 2022-09-15 PROCEDURE — 85730 THROMBOPLASTIN TIME PARTIAL: CPT

## 2022-09-15 PROCEDURE — 87086 URINE CULTURE/COLONY COUNT: CPT

## 2022-09-15 PROCEDURE — 99238 HOSP IP/OBS DSCHRG MGMT 30/<: CPT

## 2022-09-15 PROCEDURE — 97162 PT EVAL MOD COMPLEX 30 MIN: CPT

## 2022-09-15 PROCEDURE — 97116 GAIT TRAINING THERAPY: CPT

## 2022-09-15 PROCEDURE — 95705 EEG W/O VID 2-12 HR UNMNTR: CPT

## 2022-09-15 PROCEDURE — 87040 BLOOD CULTURE FOR BACTERIA: CPT

## 2022-09-15 PROCEDURE — 85027 COMPLETE CBC AUTOMATED: CPT

## 2022-09-15 RX ORDER — ENOXAPARIN SODIUM 100 MG/ML
40 INJECTION SUBCUTANEOUS EVERY 24 HOURS
Refills: 0 | Status: DISCONTINUED | OUTPATIENT
Start: 2022-09-15 | End: 2022-09-15

## 2022-09-15 RX ORDER — HEPARIN SODIUM 5000 [USP'U]/ML
5000 INJECTION INTRAVENOUS; SUBCUTANEOUS EVERY 8 HOURS
Refills: 0 | Status: DISCONTINUED | OUTPATIENT
Start: 2022-09-15 | End: 2022-09-15

## 2022-09-15 RX ADMIN — BICTEGRAVIR SODIUM, EMTRICITABINE, AND TENOFOVIR ALAFENAMIDE FUMARATE 1 TABLET(S): 30; 120; 15 TABLET ORAL at 11:50

## 2022-09-15 RX ADMIN — ENOXAPARIN SODIUM 40 MILLIGRAM(S): 100 INJECTION SUBCUTANEOUS at 11:50

## 2022-09-15 RX ADMIN — Medication 50 MILLIGRAM(S): at 06:41

## 2022-09-15 NOTE — ED ADULT NURSE NOTE - OBJECTIVE STATEMENT
31 y/o male, a&ox4, received to rm 2 with c/o CP. Pt claims CP resided an hour ago, here to seek treatment for substance abuse. Denies CP, SOB, or dyspnea at this time. PMH of HIV (positive from birth) and Guillain-Barré Disease. Respirations are even and unlabored, no signs of respiratory distress. 12 lead ECG completed. Pt speaking in clear and coherent sentences.

## 2022-09-15 NOTE — DISCHARGE NOTE PROVIDER - NSDCCPCAREPLAN_GEN_ALL_CORE_FT
PRINCIPAL DISCHARGE DIAGNOSIS  Diagnosis: Lower extremity weakness  Assessment and Plan of Treatment:       SECONDARY DISCHARGE DIAGNOSES  Diagnosis: HIV infection  Assessment and Plan of Treatment:        PRINCIPAL DISCHARGE DIAGNOSIS  Diagnosis: Lower extremity weakness  Assessment and Plan of Treatment: You were admitted for lower extremity weakness. Initially, it was thought that your lower extremity weakness was due to your HIV disease. This is a process known as HIV myelopathy, which can cause damage to your nerves. However, given that your strength improved drastically overnight while only receiving repletion for your electrolytes. As such, imbalance in your electrolytes may also have been contributing to your lower extremity weakness. However, before you were able to be fully worked up, you left the hospital against medical advice.

## 2022-09-15 NOTE — CHART NOTE - NSCHARTNOTEFT_GEN_A_CORE
Author confirming that patient has been participatory in his care on interviews I have had with him. Ongoing involvement with neurology team for workup of weakness.
Pt was in room with neurology team, pt notified staff he would like to go to [facility] and would AMA to do so. Pt then got dressed, allowed nursing to remove IV, but declined to remain any longer to sign AMA paperwork. Per nursing, pt was able to walk out of room with steady gait with neurology at bedside.

## 2022-09-15 NOTE — ED ADULT TRIAGE NOTE - CHIEF COMPLAINT QUOTE
Pt arrives to ED c/o left sided CP starting 1 hour ago with no radiation.  Pt reports for the last 2 weeks he feels unsteady in bilateral legs and feels like he is about to trip, urinary retention and difficulty moving bowels.  hx of Guillain [place] syndrome and he reports this is how he felt in the past when he had a flare up.   Pt takes Gabapentin, Lyrica, Seroquel, Mg and K.  Hx of stroke in 2020, pt states no residuals known.  Pt is HIV positive.

## 2022-09-15 NOTE — DISCHARGE NOTE PROVIDER - NSDCMRMEDTOKEN_GEN_ALL_CORE_FT
baclofen 10 mg oral tablet: 2 tab(s) orally 3 times a day MDD:6 tabs  bictegravir/emtricitabine/tenofovir 50 mg-200 mg-25 mg oral tablet: 1 tab(s) orally once a day MDD:1  magnesium oxide 400 mg oral tablet: 1 tab(s) orally once a day MDD:1  melatonin 5 mg oral tablet: 1 tab(s) orally once a day (at bedtime)  pregabalin 50 mg oral capsule: 1 cap(s) orally 3 times a day MDD:3  QUEtiapine 100 mg oral tablet: 1 tab(s) orally once a day at 8:00am  SEROquel 200 mg oral tablet: 1 tab(s) orally once a day (at bedtime) MDD:1

## 2022-09-15 NOTE — ED CLERICAL - NS ED CLERK NOTE PRE-ARRIVAL INFORMATION; ADDITIONAL PRE-ARRIVAL INFORMATION
This patient is enrolled in the [facility] readmission reduction program and has active care navigation. This patient can be followed up by the care navigation team within 24 hours. To arrange close follow-up or to obtain additional clinical information about this patient, please call the contact number above.     Please consider CDU for management if appropriate. This patient is eligible for outpatient care navigation through the [facility] readmission reduction initiative. This patient will be engaged by the transitional care management team to enroll into this program. Please call the number above to facilitate this enrollment or for close follow up.

## 2022-09-15 NOTE — DISCHARGE NOTE PROVIDER - ATTENDING DISCHARGE PHYSICAL EXAMINATION:
Focused Examination:  b/l lowers 3/5 proximally and 4/5 distally. + b/l spasticity  reflexes 3+ at knees and ankles  sensory decreased proprioception in toes and vib in feet and toes

## 2022-09-15 NOTE — ED ADULT NURSE NOTE - CHIEF COMPLAINT QUOTE
Pt arrives to ED c/o left sided CP starting 1 hour ago with no radiation.  Pt reports for the last 2 weeks he feels unsteady in bilateral legs and feels like he is about to trip, urinary retention and difficulty moving bowels.  hx of Guillain [place] syndrome and he reports this is how he felt in the past when he had a flare up.   Pt takes Gabapentin, Lyrica, Seroquel, Mg and K.  Hx of stroke in 2020, pt states no residuals known.  Pt is HIV positive.

## 2022-09-15 NOTE — DISCHARGE NOTE PROVIDER - HOSPITAL COURSE
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 Hunter), chronic back pain, concern for HIV myelopathy and asthma, presenting with fluctuating LE weakness and urinary retention transferred from [facility] pending dispo plan to [facility].       Problem List/Main Diagnoses (system-based):     #Lower extremity weakness.   Presented to the [facility] with LE weakness. Had extensive work up at [facility] end of August early Sept 2022. Extensive imaging done. EMG unremarkable. Pending VEEG and MRI of brain and c-spine, however, patient unable to tolerate exams due to anxiety.  -C/w home meds, Baclofen 10mg, pregabalin 50 milliGRAM(s) Oral three times a day  -Pending [facility] placement  -F/u PT/OT.    #HIV disease.   Patient with congenital HIV, following with ID, Dr. Hunter. Previously CD4 count 335,   - c/w Biktarvy.    #Night terrors.   Reported history of night terrors as well as possible PTSD. Seen by  during previous hospitalization  - c/w home seroquel 200mg bedtime      #Anemia.   Baseline hb ~12. on arrival hb 12.6, MCV 91.9. Normocytic anemia likely due to chronic disease. no active bleeding.     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 Hunter), chronic back pain, concern for HIV myelopathy and asthma, presenting with fluctuating LE weakness and urinary retention transferred from [facility] pending dispo plan to [facility].       Problem List/Main Diagnoses (system-based):     #Lower extremity weakness.   Presented to the [facility] with LE weakness. Had extensive work up at [facility] end of August early Sept 2022. Extensive imaging done. EMG unremarkable. Pending VEEG and MRI of brain and c-spine, however, patient unable to tolerate exams due to anxiety.  -C/w home meds, Baclofen 10mg, pregabalin 50 milliGRAM(s) Oral three times a day  -Pending [facility] placement, however, patient left hospital AMA    #HIV disease.   Patient with congenital HIV, following with ID, Dr. Hunter. Previously CD4 count 335,   - c/w Biktarvy.    #Night terrors.   Reported history of night terrors as well as possible PTSD. Seen by  during previous hospitalization  - c/w home seroquel 200mg bedtime      #Anemia.   Baseline hb ~12. on arrival hb 12.6, MCV 91.9. Normocytic anemia likely due to chronic disease. no active bleeding.     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 Hunter), chronic back pain, concern for HIV myelopathy and asthma, presenting with fluctuating LE weakness and urinary retention transferred from [facility] pending dispo plan to [facility].       Problem List/Main Diagnoses (system-based):     #Lower extremity weakness.   Presented to the [facility] with LE weakness. Had extensive work up at [facility] end of August early Sept 2022. Extensive imaging done. EMG unremarkable. Pending VEEG and MRI of brain and c-spine, however, patient unable to tolerate exams due to anxiety. HIV myelopathy vs weakness 2/2 electrolyte imbalance. Patient left AMA before conclusion of workup.  -C/w home meds, Baclofen 10mg, pregabalin 50 milliGRAM(s) Oral three times a day  -Pending [facility] placement, however, patient left hospital AMA    #HIV disease.   Patient with congenital HIV, following with ID, Dr. Hunter. Previously CD4 count 335,   - c/w Biktarvy.    #Night terrors.   Reported history of night terrors as well as possible PTSD. Seen by  during previous hospitalization  - c/w home seroquel 200mg bedtime      #Anemia.   Baseline hb ~12. on arrival hb 12.6, MCV 91.9. Normocytic anemia likely due to chronic disease. no active bleeding.     Inpatient treatment course: Patient was admitted for lower extremity weakness. Patient was admitted previously at [facility] with extensive workup and imaging performed. Etiology of lower extremity weakness is still undetermined, however, differential includes HIV myelopathy vs electrolyte imbalance. EMG unremarkable. Patient was pending MRI brain and C-spine along with VEEG, however, patient left the hospital AMA. Discussion regarding risks and benefits of AMA were not able to be discussed prior to patient leaving. AMA paperwork was not able to be signed prior to patient leaving.    Patient was discharged to: Patient left hospital AMA    New medications: None    Changes to old medications: None    Medications that were stopped: None    Items to Follow up as outpatient: MR brain/c-spine    Physical exam at time of discharge: A&Ox3, heart RRR, lungs CTAB, abdomen soft NT/ND. 3/5 strength in lower extremities bilaterally.

## 2022-09-16 ENCOUNTER — TRANSCRIPTION ENCOUNTER (OUTPATIENT)
Age: 32
End: 2022-09-16

## 2022-09-16 DIAGNOSIS — R29.898 OTHER SYMPTOMS AND SIGNS INVOLVING THE MUSCULOSKELETAL SYSTEM: ICD-10-CM

## 2022-09-16 DIAGNOSIS — J45.909 UNSPECIFIED ASTHMA, UNCOMPLICATED: ICD-10-CM

## 2022-09-16 DIAGNOSIS — R63.8 OTHER SYMPTOMS AND SIGNS CONCERNING FOOD AND FLUID INTAKE: ICD-10-CM

## 2022-09-16 DIAGNOSIS — F14.10 COCAINE ABUSE, UNCOMPLICATED: ICD-10-CM

## 2022-09-16 DIAGNOSIS — B20 HUMAN IMMUNODEFICIENCY VIRUS [HIV] DISEASE: ICD-10-CM

## 2022-09-16 LAB
4/8 RATIO: 0.26 RATIO — LOW (ref 0.9–3.6)
ABS CD8: 1448 /UL — HIGH (ref 142–740)
ALBUMIN SERPL ELPH-MCNC: 4.2 G/DL — SIGNIFICANT CHANGE UP (ref 3.3–5)
ALP SERPL-CCNC: 62 U/L — SIGNIFICANT CHANGE UP (ref 40–120)
ALT FLD-CCNC: 10 U/L — SIGNIFICANT CHANGE UP (ref 4–41)
AMPHET UR-MCNC: NEGATIVE — SIGNIFICANT CHANGE UP
ANION GAP SERPL CALC-SCNC: 12 MMOL/L — SIGNIFICANT CHANGE UP (ref 7–14)
ANION GAP SERPL CALC-SCNC: 14 MMOL/L — SIGNIFICANT CHANGE UP (ref 7–14)
APPEARANCE UR: CLEAR — SIGNIFICANT CHANGE UP
AST SERPL-CCNC: 18 U/L — SIGNIFICANT CHANGE UP (ref 4–40)
BACTERIA # UR AUTO: NEGATIVE — SIGNIFICANT CHANGE UP
BARBITURATES UR SCN-MCNC: NEGATIVE — SIGNIFICANT CHANGE UP
BASOPHILS # BLD AUTO: 0.01 K/UL — SIGNIFICANT CHANGE UP (ref 0–0.2)
BASOPHILS # BLD AUTO: 0.01 K/UL — SIGNIFICANT CHANGE UP (ref 0–0.2)
BASOPHILS NFR BLD AUTO: 0.2 % — SIGNIFICANT CHANGE UP (ref 0–2)
BASOPHILS NFR BLD AUTO: 0.2 % — SIGNIFICANT CHANGE UP (ref 0–2)
BENZODIAZ UR-MCNC: NEGATIVE — SIGNIFICANT CHANGE UP
BILIRUB SERPL-MCNC: 0.3 MG/DL — SIGNIFICANT CHANGE UP (ref 0.2–1.2)
BILIRUB UR-MCNC: NEGATIVE — SIGNIFICANT CHANGE UP
BUN SERPL-MCNC: 17 MG/DL — SIGNIFICANT CHANGE UP (ref 7–23)
BUN SERPL-MCNC: 21 MG/DL — SIGNIFICANT CHANGE UP (ref 7–23)
CALCIUM SERPL-MCNC: 9.2 MG/DL — SIGNIFICANT CHANGE UP (ref 8.4–10.5)
CALCIUM SERPL-MCNC: 9.2 MG/DL — SIGNIFICANT CHANGE UP (ref 8.4–10.5)
CD3 BLASTS SPEC-ACNC: 1910 /UL — HIGH (ref 672–1870)
CD3 BLASTS SPEC-ACNC: 80 % — SIGNIFICANT CHANGE UP (ref 59–83)
CD4 %: 16 % — LOW (ref 30–62)
CD8 %: 61 % — HIGH (ref 12–36)
CHLORIDE SERPL-SCNC: 101 MMOL/L — SIGNIFICANT CHANGE UP (ref 98–107)
CHLORIDE SERPL-SCNC: 103 MMOL/L — SIGNIFICANT CHANGE UP (ref 98–107)
CK SERPL-CCNC: 66 U/L — SIGNIFICANT CHANGE UP (ref 30–200)
CO2 SERPL-SCNC: 19 MMOL/L — LOW (ref 22–31)
CO2 SERPL-SCNC: 24 MMOL/L — SIGNIFICANT CHANGE UP (ref 22–31)
COCAINE METAB.OTHER UR-MCNC: POSITIVE
COLOR SPEC: YELLOW — SIGNIFICANT CHANGE UP
CREAT SERPL-MCNC: 0.99 MG/DL — SIGNIFICANT CHANGE UP (ref 0.5–1.3)
CREAT SERPL-MCNC: 1.01 MG/DL — SIGNIFICANT CHANGE UP (ref 0.5–1.3)
CREATININE URINE RESULT, DAU: 106 MG/DL — SIGNIFICANT CHANGE UP
DIFF PNL FLD: NEGATIVE — SIGNIFICANT CHANGE UP
DSDNA AB FLD-ACNC: <0.2 AI — SIGNIFICANT CHANGE UP
EGFR: 101 ML/MIN/1.73M2 — SIGNIFICANT CHANGE UP
EGFR: 103 ML/MIN/1.73M2 — SIGNIFICANT CHANGE UP
ENA SS-A AB FLD IA-ACNC: <0.2 AI — SIGNIFICANT CHANGE UP
EOSINOPHIL # BLD AUTO: 0.12 K/UL — SIGNIFICANT CHANGE UP (ref 0–0.5)
EOSINOPHIL # BLD AUTO: 0.16 K/UL — SIGNIFICANT CHANGE UP (ref 0–0.5)
EOSINOPHIL NFR BLD AUTO: 2.2 % — SIGNIFICANT CHANGE UP (ref 0–6)
EOSINOPHIL NFR BLD AUTO: 3.5 % — SIGNIFICANT CHANGE UP (ref 0–6)
EPI CELLS # UR: 1 /HPF — SIGNIFICANT CHANGE UP (ref 0–5)
GLUCOSE SERPL-MCNC: 119 MG/DL — HIGH (ref 70–99)
GLUCOSE SERPL-MCNC: 96 MG/DL — SIGNIFICANT CHANGE UP (ref 70–99)
GLUCOSE UR QL: NEGATIVE — SIGNIFICANT CHANGE UP
HCT VFR BLD CALC: 39.6 % — SIGNIFICANT CHANGE UP (ref 39–50)
HCT VFR BLD CALC: 39.8 % — SIGNIFICANT CHANGE UP (ref 39–50)
HGB BLD-MCNC: 12.7 G/DL — LOW (ref 13–17)
HGB BLD-MCNC: 12.9 G/DL — LOW (ref 13–17)
HYALINE CASTS # UR AUTO: 1 /LPF — SIGNIFICANT CHANGE UP (ref 0–7)
IANC: 1.42 K/UL — LOW (ref 1.8–7.4)
IANC: 2.28 K/UL — SIGNIFICANT CHANGE UP (ref 1.8–7.4)
IMM GRANULOCYTES NFR BLD AUTO: 0.2 % — SIGNIFICANT CHANGE UP (ref 0–0.9)
IMM GRANULOCYTES NFR BLD AUTO: 0.2 % — SIGNIFICANT CHANGE UP (ref 0–0.9)
KETONES UR-MCNC: NEGATIVE — SIGNIFICANT CHANGE UP
LEUKOCYTE ESTERASE UR-ACNC: NEGATIVE — SIGNIFICANT CHANGE UP
LYMPHOCYTES # BLD AUTO: 2.63 K/UL — SIGNIFICANT CHANGE UP (ref 1–3.3)
LYMPHOCYTES # BLD AUTO: 2.66 K/UL — SIGNIFICANT CHANGE UP (ref 1–3.3)
LYMPHOCYTES # BLD AUTO: 48.5 % — HIGH (ref 13–44)
LYMPHOCYTES # BLD AUTO: 57.5 % — HIGH (ref 13–44)
MAGNESIUM SERPL-MCNC: 1.5 MG/DL — LOW (ref 1.6–2.6)
MCHC RBC-ENTMCNC: 29.3 PG — SIGNIFICANT CHANGE UP (ref 27–34)
MCHC RBC-ENTMCNC: 29.4 PG — SIGNIFICANT CHANGE UP (ref 27–34)
MCHC RBC-ENTMCNC: 32.1 GM/DL — SIGNIFICANT CHANGE UP (ref 32–36)
MCHC RBC-ENTMCNC: 32.4 GM/DL — SIGNIFICANT CHANGE UP (ref 32–36)
MCV RBC AUTO: 90.5 FL — SIGNIFICANT CHANGE UP (ref 80–100)
MCV RBC AUTO: 91.7 FL — SIGNIFICANT CHANGE UP (ref 80–100)
METHADONE UR-MCNC: NEGATIVE — SIGNIFICANT CHANGE UP
MONOCYTES # BLD AUTO: 0.34 K/UL — SIGNIFICANT CHANGE UP (ref 0–0.9)
MONOCYTES # BLD AUTO: 0.4 K/UL — SIGNIFICANT CHANGE UP (ref 0–0.9)
MONOCYTES NFR BLD AUTO: 7.3 % — SIGNIFICANT CHANGE UP (ref 2–14)
MONOCYTES NFR BLD AUTO: 7.4 % — SIGNIFICANT CHANGE UP (ref 2–14)
NEUTROPHILS # BLD AUTO: 1.42 K/UL — LOW (ref 1.8–7.4)
NEUTROPHILS # BLD AUTO: 2.28 K/UL — SIGNIFICANT CHANGE UP (ref 1.8–7.4)
NEUTROPHILS NFR BLD AUTO: 31.2 % — LOW (ref 43–77)
NEUTROPHILS NFR BLD AUTO: 41.6 % — LOW (ref 43–77)
NITRITE UR-MCNC: NEGATIVE — SIGNIFICANT CHANGE UP
NRBC # BLD: 0 /100 WBCS — SIGNIFICANT CHANGE UP (ref 0–0)
NRBC # BLD: 0 /100 WBCS — SIGNIFICANT CHANGE UP (ref 0–0)
NRBC # FLD: 0 K/UL — SIGNIFICANT CHANGE UP (ref 0–0)
NRBC # FLD: 0 K/UL — SIGNIFICANT CHANGE UP (ref 0–0)
OPIATES UR-MCNC: NEGATIVE — SIGNIFICANT CHANGE UP
OXYCODONE UR-MCNC: POSITIVE
PCP SPEC-MCNC: SIGNIFICANT CHANGE UP
PCP UR-MCNC: NEGATIVE — SIGNIFICANT CHANGE UP
PH UR: 5.5 — SIGNIFICANT CHANGE UP (ref 5–8)
PHOSPHATE SERPL-MCNC: 3.7 MG/DL — SIGNIFICANT CHANGE UP (ref 2.5–4.5)
PLATELET # BLD AUTO: 249 K/UL — SIGNIFICANT CHANGE UP (ref 150–400)
PLATELET # BLD AUTO: 268 K/UL — SIGNIFICANT CHANGE UP (ref 150–400)
POTASSIUM SERPL-MCNC: 3.4 MMOL/L — LOW (ref 3.5–5.3)
POTASSIUM SERPL-MCNC: 3.7 MMOL/L — SIGNIFICANT CHANGE UP (ref 3.5–5.3)
POTASSIUM SERPL-SCNC: 3.4 MMOL/L — LOW (ref 3.5–5.3)
POTASSIUM SERPL-SCNC: 3.7 MMOL/L — SIGNIFICANT CHANGE UP (ref 3.5–5.3)
PROT SERPL-MCNC: 7.5 G/DL — SIGNIFICANT CHANGE UP (ref 6–8.3)
PROT UR-MCNC: NEGATIVE — SIGNIFICANT CHANGE UP
RBC # BLD: 4.32 M/UL — SIGNIFICANT CHANGE UP (ref 4.2–5.8)
RBC # BLD: 4.4 M/UL — SIGNIFICANT CHANGE UP (ref 4.2–5.8)
RBC # FLD: 13.2 % — SIGNIFICANT CHANGE UP (ref 10.3–14.5)
RBC # FLD: 13.5 % — SIGNIFICANT CHANGE UP (ref 10.3–14.5)
RBC CASTS # UR COMP ASSIST: 1 /HPF — SIGNIFICANT CHANGE UP (ref 0–4)
SARS-COV-2 RNA SPEC QL NAA+PROBE: SIGNIFICANT CHANGE UP
SODIUM SERPL-SCNC: 136 MMOL/L — SIGNIFICANT CHANGE UP (ref 135–145)
SODIUM SERPL-SCNC: 137 MMOL/L — SIGNIFICANT CHANGE UP (ref 135–145)
SP GR SPEC: 1.03 — SIGNIFICANT CHANGE UP (ref 1.01–1.05)
T-CELL CD4 SUBSET PNL BLD: 381 /UL — LOW (ref 489–1457)
THC UR QL: POSITIVE
TSH SERPL-MCNC: 0.79 UIU/ML — SIGNIFICANT CHANGE UP (ref 0.27–4.2)
UROBILINOGEN FLD QL: SIGNIFICANT CHANGE UP
WBC # BLD: 4.57 K/UL — SIGNIFICANT CHANGE UP (ref 3.8–10.5)
WBC # BLD: 5.48 K/UL — SIGNIFICANT CHANGE UP (ref 3.8–10.5)
WBC # FLD AUTO: 4.57 K/UL — SIGNIFICANT CHANGE UP (ref 3.8–10.5)
WBC # FLD AUTO: 5.48 K/UL — SIGNIFICANT CHANGE UP (ref 3.8–10.5)
WBC UR QL: 2 /HPF — SIGNIFICANT CHANGE UP (ref 0–5)

## 2022-09-16 PROCEDURE — 99222 1ST HOSP IP/OBS MODERATE 55: CPT

## 2022-09-16 PROCEDURE — 71046 X-RAY EXAM CHEST 2 VIEWS: CPT | Mod: 26

## 2022-09-16 PROCEDURE — 12345: CPT | Mod: NC

## 2022-09-16 PROCEDURE — 99223 1ST HOSP IP/OBS HIGH 75: CPT

## 2022-09-16 RX ORDER — SENNA PLUS 8.6 MG/1
2 TABLET ORAL AT BEDTIME
Refills: 0 | Status: DISCONTINUED | OUTPATIENT
Start: 2022-09-16 | End: 2022-09-16

## 2022-09-16 RX ORDER — BACLOFEN 100 %
5 POWDER (GRAM) MISCELLANEOUS EVERY 8 HOURS
Refills: 0 | Status: DISCONTINUED | OUTPATIENT
Start: 2022-09-16 | End: 2022-09-16

## 2022-09-16 RX ORDER — QUETIAPINE FUMARATE 200 MG/1
100 TABLET, FILM COATED ORAL EVERY 24 HOURS
Refills: 0 | Status: DISCONTINUED | OUTPATIENT
Start: 2022-09-16 | End: 2022-09-16

## 2022-09-16 RX ORDER — ALBUTEROL 90 UG/1
2 AEROSOL, METERED ORAL EVERY 6 HOURS
Refills: 0 | Status: DISCONTINUED | OUTPATIENT
Start: 2022-09-16 | End: 2022-09-16

## 2022-09-16 RX ORDER — OXYCODONE HYDROCHLORIDE 5 MG/1
5 TABLET ORAL EVERY 6 HOURS
Refills: 0 | Status: DISCONTINUED | OUTPATIENT
Start: 2022-09-16 | End: 2022-09-16

## 2022-09-16 RX ORDER — BICTEGRAVIR SODIUM, EMTRICITABINE, AND TENOFOVIR ALAFENAMIDE FUMARATE 30; 120; 15 MG/1; MG/1; MG/1
1 TABLET ORAL DAILY
Refills: 0 | Status: DISCONTINUED | OUTPATIENT
Start: 2022-09-16 | End: 2022-09-16

## 2022-09-16 RX ORDER — POTASSIUM CHLORIDE 20 MEQ
40 PACKET (EA) ORAL ONCE
Refills: 0 | Status: COMPLETED | OUTPATIENT
Start: 2022-09-16 | End: 2022-09-16

## 2022-09-16 RX ORDER — MAGNESIUM SULFATE 500 MG/ML
1 VIAL (ML) INJECTION ONCE
Refills: 0 | Status: COMPLETED | OUTPATIENT
Start: 2022-09-16 | End: 2022-09-16

## 2022-09-16 RX ORDER — POLYETHYLENE GLYCOL 3350 17 G/17G
17 POWDER, FOR SOLUTION ORAL AT BEDTIME
Refills: 0 | Status: DISCONTINUED | OUTPATIENT
Start: 2022-09-16 | End: 2022-09-16

## 2022-09-16 RX ORDER — ENOXAPARIN SODIUM 100 MG/ML
40 INJECTION SUBCUTANEOUS EVERY 24 HOURS
Refills: 0 | Status: DISCONTINUED | OUTPATIENT
Start: 2022-09-16 | End: 2022-09-16

## 2022-09-16 RX ORDER — ACETAMINOPHEN 500 MG
650 TABLET ORAL EVERY 6 HOURS
Refills: 0 | Status: DISCONTINUED | OUTPATIENT
Start: 2022-09-16 | End: 2022-09-16

## 2022-09-16 RX ORDER — LANOLIN ALCOHOL/MO/W.PET/CERES
3 CREAM (GRAM) TOPICAL AT BEDTIME
Refills: 0 | Status: DISCONTINUED | OUTPATIENT
Start: 2022-09-16 | End: 2022-09-16

## 2022-09-16 RX ORDER — QUETIAPINE FUMARATE 200 MG/1
200 TABLET, FILM COATED ORAL EVERY 24 HOURS
Refills: 0 | Status: DISCONTINUED | OUTPATIENT
Start: 2022-09-16 | End: 2022-09-16

## 2022-09-16 RX ADMIN — Medication 100 GRAM(S): at 09:40

## 2022-09-16 RX ADMIN — OXYCODONE HYDROCHLORIDE 5 MILLIGRAM(S): 5 TABLET ORAL at 06:00

## 2022-09-16 RX ADMIN — QUETIAPINE FUMARATE 100 MILLIGRAM(S): 200 TABLET, FILM COATED ORAL at 09:41

## 2022-09-16 RX ADMIN — OXYCODONE HYDROCHLORIDE 5 MILLIGRAM(S): 5 TABLET ORAL at 05:04

## 2022-09-16 RX ADMIN — Medication 50 MILLIGRAM(S): at 13:20

## 2022-09-16 RX ADMIN — BICTEGRAVIR SODIUM, EMTRICITABINE, AND TENOFOVIR ALAFENAMIDE FUMARATE 1 TABLET(S): 30; 120; 15 TABLET ORAL at 09:42

## 2022-09-16 RX ADMIN — QUETIAPINE FUMARATE 200 MILLIGRAM(S): 200 TABLET, FILM COATED ORAL at 22:49

## 2022-09-16 RX ADMIN — Medication 40 MILLIEQUIVALENT(S): at 09:40

## 2022-09-16 RX ADMIN — Medication 50 MILLIGRAM(S): at 05:04

## 2022-09-16 RX ADMIN — OXYCODONE HYDROCHLORIDE 5 MILLIGRAM(S): 5 TABLET ORAL at 15:51

## 2022-09-16 RX ADMIN — Medication 50 MILLIGRAM(S): at 22:49

## 2022-09-16 RX ADMIN — Medication 3 MILLIGRAM(S): at 22:51

## 2022-09-16 NOTE — PATIENT PROFILE ADULT - FALL HARM RISK - HARM RISK INTERVENTIONS

## 2022-09-16 NOTE — PROGRESS NOTE ADULT - PROBLEM SELECTOR PLAN 1
MRI lumbar spines (6/5/21, 5/21/21, 5/2/21, 10/26/20). MRI cervical spine (5/2/21), MRI thoracic spine (5/2/21, 10/26/20), MRI head (3/25/20) , MR recently thoracic/lumbar negative  -CT head noncon recently negative for acute findings  -EMG/MR negative for signs of neuropathy/myelopathy  -LP negative prior  -PT evaluation for [facility] placement  - will notify neurology as pt is well known to their service MRI lumbar spines (6/5/21, 5/21/21, 5/2/21, 10/26/20). MRI cervical spine (5/2/21), MRI thoracic spine (5/2/21, 10/26/20), MRI head (3/25/20) , MR recently thoracic/lumbar negative  -CT head noncon recently negative for acute findings  -EMG/MR negative for signs of neuropathy/myelopathy  -LP negative prior  -PT evaluation for [facility] placement  - discussed case with neurology, they had signed off during last [facility] hospitalization and given no change in presentation there is no further workup required at this time MRI lumbar spines (6/5/21, 5/21/21, 5/2/21, 10/26/20). MRI cervical spine (5/2/21), MRI thoracic spine (5/2/21, 10/26/20), MRI head (3/25/20) , MR 9/2022 thoracic/lumbar negative  -CT head noncon recently negative for acute findings  -EMG/MR negative for signs of neuropathy/myelopathy  -LP negative prior  -PT evaluation for [facility] placement  - discussed case with neurology, they had signed off during last [facility] hospitalization and given no change in presentation there is no further workup required at this time

## 2022-09-16 NOTE — ED PROVIDER NOTE - PHYSICAL EXAMINATION
General: well appearing, no acute distress, AOx3  Skin: no rash, no pallor  Head: normocephalic, atraumatic  Eyes: clear conjunctiva, EOMI  ENMT: airway patent, no nasal discharge  Cardiovascular: normal rate, normal rhythm, S1/S2  Pulmonary: clear to auscultation bilaterally, no rales, rhonchi, or wheeze  Abdomen: soft, nontender  Musculoskeletal: moving extremities well, no deformity, no tenderness over extremities   Neuro: Rectal exam chaperoned by EDT - no rectal tone, sensation in tact, pt reports unchanged from baseline, patellar reflex 2+ b/l, 3/5 strength with plantar/dorsiflexion, knee flexion/extension, hip flexion/extension b/l  Psych: normal mood, normal affect

## 2022-09-16 NOTE — CONSULT NOTE ADULT - CONSULT REQUESTED BY NAME
Progress Notes by Janelle Felix DO at 01/07/17 12:15 PM     Author:  Janelle Felix DO Service:  (none) Author Type:  Physician     Filed:  01/09/17 12:31 PM Encounter Date:  1/7/2017 Status:  Signed     :  Janelle Felix DO (Physician)            Patient presents to immediate care with[JP1.1T] wife (RN helping interpret)[JP1.1M].     ORTIZ Clayton is a 58 year old male who presents to the walk in care with complains of[JP1.1T] left upper[JP1.1M] back pain.  The pain started[JP1.1T] 3 days[JP1.1M] ago.      Any injury---[JP1.1T] no[JP1.1M]    Pain is aggravated by[JP1.1T] movements/ deep breaths/ rolling in bed.[JP1.1M]    Medication or things tried for pain relief:[JP1.1T] no[JP1.1M]    ROS--  -Any radiation of pain:[JP1.1T]  no[JP1.1M]   -Any associated weakness or new paresthesias to arms or legs:[JP1.1T] NO[JP1.1M]   -Any loss of bladder or bowel control:[JP1.1T] NO[JP1.1M]  -Any urine symptoms such as urgency / hesitancy:[JP1.1T] NO[JP1.1M]  -Any cough:[JP1.1T] NO[JP1.1M]  Abdominal pain--[JP1.1T]NO  -Any nausea- No  --any shortness of breath: no    PERTINENT MEDICAL HISTORY  Any history of chronic back pain in the past-- no  --any cardiac history: no  Any history of back surgery-- no    Past Medical History--[JP1.1M]  Past Medical History     Diagnosis  Date   • Colonoscopy refused    • Diabetes mellitus (HCC) 10/25/2011   • Dyslipidemia 10/25/2011   • Hearing loss    • Hypertension 10/25/2011   • Osteoarthritis 10/25/2011[JP1.2T]       Social history- smoker[JP1.1T]  No[JP1.1M]    Allergies--  Review of patient's allergies indicates no known allergies.    Medications--  Current outpatient prescriptions prior to encounter       Medication  Sig Dispense Refill   • simvastatin (ZOCOR) 40 MG tablet TAKE 1 TABLET BY MOUTH EVERY EVENING AT BEDTIME 90 Tab 0   • lisinopril-hydrochlorothiazide (PRINZIDE,ZESTORETIC) 20-12.5 MG per tablet TAKE 1 TABLET BY MOUTH DAILY 90 Tab 0   • metformin (GLUCOPHAGE) 850 MG  tablet Take 1 Tab by mouth 3 (three) times daily before meals. 90 Tab 2   • simvastatin (ZOCOR) 40 MG tablet Take 1 Tab by mouth nightly. 90 Tab 1   • lisinopril-hydrochlorothiazide (PRINZIDE,ZESTORETIC) 20-12.5 MG per tablet Take 1 Tab by mouth daily. 90 Tab 1   • vitamin B-12 (CYANOCOBALAMIN) 1000 MCG tablet Take 1 Tab by mouth daily. 30 Tab 11   • sildenafil (VIAGRA) 25 MG tablet Take 1 Tab by mouth as needed for Erectile Dysfunction. 30 Tab 2   • aspirin 81 MG PO TABS 1 TABLET DAILY         OBJECTIVE  General appearance: alert & oriented, pleasant and comfortable  Filed Vitals:     01/07/17 1133   BP: 141/81   Pulse: 75   Resp: 16   Temp: 97.8 °F (36.6 °C)   SpO2: 98%   PainSc:   0 (0-10 Scale)       Lungs:  clear to auscultation, good aeration  CV:  regular rate and rhythm, no murmurs    *BACK EXAM--[JP1.1T] Negative Findings: range of motion normal, no skin lesions, erythema, or scars and Positive Findings:  -- reproducible pain medial border of left scapula with palpation and with movements of torso/ left arm[JP1.1M]  DTR's:  +2/4 symmetric Upper and lower extremities bilat  Strength:  5/5 upper and lower extremities  Negative straight leg raise  Neurovascular-- intact at baseline for patient    Rash: no signs of shingles    Workup at [facility]-[JP1.1T]  EKG--NSR[JP1.1M]    ASSESSMENT[JP1.1T]   Left upper/ thoraci[JP1.1M] back pain--[JP1.1T] muscular[JP1.1M]      PLAN  1)  See orders for medications prescribed.  Side effects of medications discussed with patient in detail and questions addressed.      2)  Can also alternate ice/ heat to area but do not fall asleep with heating pad.      3)  Recommended to increase range of motion as tolerated.      4) Expect gradual improvement on medications.  If not improving on medications over 5-7 days then recheck with the primary care doctor for further treatment options.     But if sudden worsening or onset of new symptoms such as numbness/tingling to extremities, weakness  to extremities or loss of stool/urine then must seek medical care immediately.[JP1.1T]      5) reviewed cardiac symptoms-- if this back pain worsens, changes in symptoms, onset of shortness of breath, new concerns then go to ER.[JP1.1M]     Patient agrees with plan and follow up recommendations.    Electronically Signed by:    Janelle Felix DO , 1/7/2017[JP1.1T]        Revision History        User Key Date/Time User Provider Type Action    > JP1.2 01/09/17 12:31 PM Janelle Felix DO Physician Sign     JP1.1 01/07/17 12:15 PM Janelle Felix DO Physician     M - Manual, T - Template             Dr Levine

## 2022-09-16 NOTE — DISCHARGE NOTE PROVIDER - CARE PROVIDER_API CALL
Your, Doctor  Phone: (   )    -  Fax: (   )    -  Follow Up Time:     Dr. Hunter,   Phone: (   )    -  Fax: (   )    -  Follow Up Time:

## 2022-09-16 NOTE — DISCHARGE NOTE PROVIDER - PROVIDER TOKENS
FREE:[LAST:[Your],FIRST:[Doctor],PHONE:[(   )    -],FAX:[(   )    -]],FREE:[LAST:[Dr. Hunter],PHONE:[(   )    -],FAX:[(   )    -]]

## 2022-09-16 NOTE — H&P ADULT - NSHPPHYSICALEXAM_GEN_ALL_CORE
PHYSICAL EXAM:  GENERAL: NAD, well-developed  HEAD:  Atraumatic, Normocephalic  EYES: EOMI, PERRLA, conjunctiva and sclera clear  NECK: Supple, No JVD  CHEST/LUNG: Clear to auscultation bilaterally; No wheeze  HEART: Regular rate and rhythm; No murmurs, rubs, or gallops  ABDOMEN: Soft, Nontender, Nondistended; Bowel sounds present  EXTREMITIES:  2+ Peripheral Pulses, No clubbing, cyanosis, or edema  PSYCH: AAOx3  NEUROLOGY: 5/5 UE strength 4/5 LE strength, sensation intact, pain on flexion of hips.   SKIN: No rashes or lesions

## 2022-09-16 NOTE — H&P ADULT - NSHPLABSRESULTS_GEN_ALL_CORE
LABS:                         12.7   5.48  )-----------( 268      ( 16 Sep 2022 01:10 )             39.6     09-16    136  |  103  |  21  ----------------------------<  119<H>  3.7   |  19<L>  |  1.01    Ca    9.2      16 Sep 2022 01:10  Phos  3.6     09-14  Mg     1.6     09-14    TPro  7.5  /  Alb  4.2  /  TBili  0.3  /  DBili  x   /  AST  18  /  ALT  10  /  AlkPhos  62  09-16        CARDIAC MARKERS ( 16 Sep 2022 01:10 )  x     / x     / 66 U/L / x     / x            RADIOLOGY, EKG & ADDITIONAL TESTS: Reviewed.

## 2022-09-16 NOTE — ED PROVIDER NOTE - OBJECTIVE STATEMENT
Timothy Lewis, PGY-3- 32 year old male with a pmhx of congenital HIV (CD4 count 232, VL 12,489 9/12/22 on Biktarvy, following with ID, Dr. Hunter), chronic back pain, concern for HIV myelopathy (not approved by ID yet ion previous admission) and asthma, Timothy Debbie, PGY-3- 32 year old male with a pmhx of congenital HIV (CD4 count 232, VL 12,489 9/12/22 on Biktarvy, following with ID, Dr. Hunter), chronic back pain, concern for HIV myelopathy (not approved by ID yet ion previous admission) and asthma, urinary/bowel incontinence intermittently, signed out AMA from [facility] earlier today, presents to ED for evaluation of CP and b/l leg weakness. Pt reports he mainly came here because he is now willing to go to [facility]. He reports he did not want to be placed at [facility] in [place] so he left and came here. Pt reports the CP was intermittent, started after leaving [facility] and is now resolved. Non exertional. No fever, chills, vomiting, diarrhea, abd pain, dysuria, numbness, tingling. Pt reports taking his HIV meds daily, no missed doses.

## 2022-09-16 NOTE — DISCHARGE NOTE PROVIDER - HOSPITAL COURSE
33 y/o m hx congenital HIV (CD4 232/VL 12k 9/22 on Biktarvy), recently left AMA from [facility] w/ weakness as well, asthma, occasional urinary and bowel incontinence.   32M with congenital HIV, chronic back pain, ?HIV myelopathy and asthma, presenting with fluctuating LE weakness and urinary retention, signed out AMA from [facility] and presented to [facility] for [facility] placement.    Lower extremity weakness.   -MRI lumbar spines (6/5/21, 5/21/21, 5/2/21, 10/26/20). MRI cervical spine (5/2/21), MRI thoracic spine (5/2/21, 10/26/20), MRI head (3/25/20) , MR 9/2022 thoracic/lumbar negative  -CT head noncon recently negative for acute findings  -EMG/MR negative for signs of neuropathy/myelopathy  -LP negative prior  -PT recommended [facility]- awaiting placement  -case was discussed with neurology, they had signed off during last [facility] hospitalization and given no change in presentation there is no further workup required at this time  -C/w home meds, Baclofen 10mg, pregabalin 50 milliGRAM(s) Oral three times a day.    HIV (human immunodeficiency virus infection).   -c/w biktarvy  - seen by ID, repeat viral load in 2 weeks.    Asthma.   - albuterol PRN.    Cocaine abuse.   - U tox positive  - SBIRT screening.      Dispo- [facility], pending placement____    On _________ , discussed with  __________ , patient is medically cleared and optimized for discharge today to rehab. All medications were reviewed with attending, and any new/required prescriptions were sent to mutually agreed upon pharmacy.       32M with congenital HIV, chronic back pain, ?HIV myelopathy and asthma, presenting with fluctuating LE weakness and urinary retention, signed out AMA from [facility] and presented to [facility] for [facility] placement.    Lower extremity weakness.   -MRI lumbar spines (6/5/21, 5/21/21, 5/2/21, 10/26/20). MRI cervical spine (5/2/21), MRI thoracic spine (5/2/21, 10/26/20), MRI head (3/25/20) , MR 9/2022 thoracic/lumbar negative  -CT head noncon recently negative for acute findings  -EMG/MR negative for signs of neuropathy/myelopathy  -LP negative prior  -PT recommended [facility]  -case was discussed with neurology, they had signed off during last [facility] hospitalization and given no change in presentation there is no further workup required at this time  -C/w home meds, Baclofen 10mg, pregabalin 50 milliGRAM(s) Oral three times a day.    HIV (human immunodeficiency virus infection).   -c/w biktarvy  - seen by ID, repeat viral load in 2 weeks.    Asthma.   - albuterol PRN.    Cocaine abuse.   - U tox positive  - SBIRT screening.      Pt medically cleared and was pending placement to [facility], however, pt reported to attending that his family arranged another living situation for him and that they were able to set it up for today.   Informed by nurse shortly after rounds that pt pulled out his own IV, left it on his bed and left AMA without signing. Dr. Mckeon made aware.       32M with congenital HIV, chronic back pain, ?HIV myelopathy and asthma, presenting with fluctuating LE weakness and urinary retention, signed out AMA from [facility] and presented to [facility] for [facility] placement.    Lower extremity weakness.   -MRI lumbar spines (6/5/21, 5/21/21, 5/2/21, 10/26/20). MRI cervical spine (5/2/21), MRI thoracic spine (5/2/21, 10/26/20), MRI head (3/25/20) , MR 9/2022 thoracic/lumbar negative  -CT head noncon recently negative for acute findings  -EMG/MR negative for signs of neuropathy/myelopathy  -LP negative prior  -PT recommended [facility]  -case was discussed with neurology, they had signed off during last [facility] hospitalization and given no change in presentation there is no further workup required at this time  -C/w home meds, Baclofen 10mg, pregabalin 50 milliGRAM(s) Oral three times a day.    HIV (human immunodeficiency virus infection).   -c/w biktarvy  - seen by ID, repeat viral load in 2 weeks.    Asthma.   - albuterol PRN.    Cocaine abuse.   - U tox positive  - SBIRT screening.      Pt medically cleared and was pending placement to [facility], however, pt reported to attending that his family arranged another living situation for him and that they were able to set it up for today 9/19.   Informed by nurse shortly after rounds that pt pulled out his own IV, left it on his bed, and left AMA without signing. Dr. Mckeon made aware.       32M with congenital HIV, chronic back pain, ?HIV myelopathy and asthma, presenting with fluctuating LE weakness and urinary retention, signed out AMA from [facility] and presented to [facility] for [facility] placement.    Lower extremity weakness.   -MRI lumbar spines (6/5/21, 5/21/21, 5/2/21, 10/26/20). MRI cervical spine (5/2/21), MRI thoracic spine (5/2/21, 10/26/20), MRI head (3/25/20) , MR 9/2022 thoracic/lumbar negative  -CT head noncon recently negative for acute findings  -EMG/MR negative for signs of neuropathy/myelopathy  -LP negative prior  -PT recommended [facility]  -case was discussed with neurology, they had signed off during last [facility] hospitalization and given no change in presentation there is no further workup required at this time  -C/w home meds, Baclofen 10mg, pregabalin 50 milliGRAM(s) Oral three times a day.    HIV (human immunodeficiency virus infection).   -c/w biktarvy  - seen by ID, repeat viral load in 2 weeks.    Asthma.   - albuterol PRN.    Cocaine abuse.   - U tox positive  - SBIRT screening.      Pt medically cleared and was pending placement to [facility], however, pt reported to attending that his family arranged another living situation for him and that they were able to set it up for today 9/19.   Informed by nurse shortly after rounds that pt pulled out his own IV, left it on his bed, and left AMA without signing. Dr. Mckeon made aware.

## 2022-09-16 NOTE — PATIENT PROFILE ADULT - FUNCTIONAL ASSESSMENT - BASIC MOBILITY 6.
3-calculated by average/Not able to assess (calculate score using [facility] averaging method)

## 2022-09-16 NOTE — DISCHARGE NOTE PROVIDER - NSDCCPCAREPLAN_GEN_ALL_CORE_FT
PRINCIPAL DISCHARGE DIAGNOSIS  Diagnosis: Leg weakness  Assessment and Plan of Treatment:       SECONDARY DISCHARGE DIAGNOSES  Diagnosis: HIV (human immunodeficiency virus infection)  Assessment and Plan of Treatment: Continue Biktarvy, it is very important to be comliant with use   Viral load at 12K   Repeat in 2 weeks       PRINCIPAL DISCHARGE DIAGNOSIS  Diagnosis: Leg weakness  Assessment and Plan of Treatment:       SECONDARY DISCHARGE DIAGNOSES  Diagnosis: HIV (human immunodeficiency virus infection)  Assessment and Plan of Treatment: Continue Biktarvy, it is very important to be compliant with your medication. You viral load was 12K. You should repeat this in 2 weeks.  Follow up as outpatient with Dr Hunter [phone].    Diagnosis: Asthma  Assessment and Plan of Treatment:     Diagnosis: Cocaine abuse  Assessment and Plan of Treatment:      PRINCIPAL DISCHARGE DIAGNOSIS  Diagnosis: Leg weakness  Assessment and Plan of Treatment: You came in with leg weakness. You refused the recommended XRAY. You were seen by physical therapy and they recommended rehab.      SECONDARY DISCHARGE DIAGNOSES  Diagnosis: HIV (human immunodeficiency virus infection)  Assessment and Plan of Treatment: Continue Biktarvy, it is very important to be compliant with your medication. You viral load was 12K. You should repeat this in 2 weeks.  Follow up as outpatient with Dr Hunter [phone].    Diagnosis: Asthma  Assessment and Plan of Treatment: Use inhalers as needed.    Diagnosis: Cocaine abuse  Assessment and Plan of Treatment: Your urine was positive for cocaine, THC, and oxycodone. You should avoid subtance use.

## 2022-09-16 NOTE — CONSULT NOTE ADULT - CONSULT REQUESTED DATE/TIME
11/28/2018 10:59 AM 
 
Patient:  Trinity Matamoros YOB: 1972 Date of Visit: 11/28/2018 Dear Fatou Agustin MD 
[address] [place] [id number] VIA In Basket 
 : Thank you for referring Mr. Ale Cortés to me for evaluation/treatment. Below are the relevant portions of my assessment and plan of care. If you have questions, please do not hesitate to call me. I look forward to following Mr. Samira Pickering along with you.  
 
 
 
Sincerely, 
 
 
Freida Machado MD 
 
 16-Sep-2022 16:59

## 2022-09-16 NOTE — CONSULT NOTE ADULT - SUBJECTIVE AND OBJECTIVE BOX
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  During prior admissions, his symptopms have improved suddenly.     There was concern for HIV myelopathy.    Recent MRI without pathology    Now presents for weakness.     He states that he is able to walk at this time. He is passing urine. urine visible in bed side urinal.    No fever        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      Asthma      Closed fracture of multiple ribs of right side, initial encounter      Cocaine abuse      Chronic sinusitis      Homeless      History of orthopedic surgery  left arm          Allergies    Ceclor (Unknown)  Toradol (Anaphylaxis; Swelling)  Toradol (Swelling)    Intolerances        ANTIMICROBIALS:  bictegravir 50 mG/emtricitabine 200 mG/tenofovir alafenamide 25 mG (BIKTARVY) 1 daily      OTHER MEDS:  acetaminophen     Tablet .. 650 milliGRAM(s) Oral every 6 hours PRN  ALBUTerol    90 MICROgram(s) HFA Inhaler 2 Puff(s) Inhalation every 6 hours PRN  baclofen 5 milliGRAM(s) Oral every 8 hours PRN  enoxaparin Injectable 40 milliGRAM(s) SubCutaneous every 24 hours  melatonin 3 milliGRAM(s) Oral at bedtime PRN  oxyCODONE    IR 5 milliGRAM(s) Oral every 6 hours PRN  polyethylene glycol 3350 17 Gram(s) Oral at bedtime  pregabalin 50 milliGRAM(s) Oral every 8 hours  QUEtiapine 100 milliGRAM(s) Oral every 24 hours  QUEtiapine 200 milliGRAM(s) Oral every 24 hours  senna 2 Tablet(s) Oral at bedtime PRN      SOCIAL HISTORY:  lives alone  Prior drug use    FAMILY HISTORY:  FH: HIV infection (Mother)        REVIEW OF SYSTEMS  [  ] ROS unobtainable because:    [ x ] All other systems negative except as noted below:	    Constitutional:  [ ] fever [ ] chills  [ ] weight loss  [ ] weakness  Skin:  [ ] rash [ ] phlebitis	  Eyes: [ ] icterus [ ] pain  [ ] discharge	  ENMT: [ ] sore throat  [ ] thrush [ ] ulcers [ ] exudates  Respiratory: [ ] dyspnea [ ] hemoptysis [ ] cough [ ] sputum	  Cardiovascular:  [ ] chest pain [ ] palpitations [ ] edema	  Gastrointestinal:  [ ] nausea [ ] vomiting [ ] diarrhea [ ] constipation [ ] pain	  Genitourinary:  [ ] dysuria [ ] frequency [ ] hematuria [ ] discharge [ ] flank pain  [ x] incontinence  Musculoskeletal:  [ ] myalgias [ ] arthralgias [ ] arthritis  [ x] weakness  Neurological:  [ ] headache [ ] seizures  [ ] confusion/altered mental status  Psychiatric:  [ ] anxiety [ ] depression	  Hematology/Lymphatics:  [ ] lymphadenopathy  Endocrine:  [ ] adrenal [ ] thyroid  Allergic/Immunologic:	 [ ] transplant [ ] seasonal    PHYSICAL EXAM:  General: [x ] non-toxic  HEAD/EYES: [ ] PERRL [x ] white sclera [ ] icterus  ENT:  [ ] normal [ x] supple [ ] thrush [ ] pharyngeal exudate  Cardiovascular:   [ ] murmur [x ] normal [ ] PPM/AICD  Respiratory:  [x ] clear to ausculation bilaterally  GI:  [ x] soft, non-tender, normal bowel sounds  :  [ ] gonzales [ ] no CVA tenderness   Musculoskeletal:  [ x] moves all ext  Neurologic:  [ x] sensation intact  Skin:  [x ] no rash  Lymph: [x ] no lymphadenopathy  Psychiatric:  [ ] appropriate affect [ ] alert & oriented  Lines:  [ x] no phlebitis [ ] central line          Drug Dosing Weight  Height (cm): 182.9 (15 Sep 2022 21:21)  Weight (kg): 93 (11 Sep 2022 22:02)  BMI (kg/m2): 27.8 (15 Sep 2022 21:21)  BSA (m2): 2.15 (15 Sep 2022 21:21)    Vital Signs Last 24 Hrs  T(F): 97.6 (22 @ 13:22), Max: 98.7 (22 @ 20:55)    Vital Signs Last 24 Hrs  HR: 63 (22 @ 13:22) (61 - 92)  BP: 102/54 (22 @ 13:22) (102/54 - 122/78)  RR: 16 (22 @ 13:22)  SpO2: 100% (22 @ 13:22) (97% - 100%)  Wt(kg): --                          12.9   4.57  )-----------( 249      ( 16 Sep 2022 05:52 )             39.8       -    137  |  101  |  17  ----------------------------<  96  3.4<L>   |  24  |  0.99    Ca    9.2      16 Sep 2022 05:52  Phos  3.7       Mg     1.50         TPro  7.5  /  Alb  4.2  /  TBili  0.3  /  DBili  x   /  AST  18  /  ALT  10  /  AlkPhos  62        Urinalysis Basic - ( 16 Sep 2022 04:20 )    Color: Yellow / Appearance: Clear / S.027 / pH: x  Gluc: x / Ketone: Negative  / Bili: Negative / Urobili: <2 mg/dL   Blood: x / Protein: Negative / Nitrite: Negative   Leuk Esterase: Negative / RBC: 1 /HPF / WBC 2 /HPF   Sq Epi: x / Non Sq Epi: 1 /HPF / Bacteria: Negative        MICROBIOLOGY:    RADIOLOGY:

## 2022-09-16 NOTE — PROGRESS NOTE ADULT - ASSESSMENT
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 32M with congenital HIV, chronic back pain, ?HIV myelopathy and asthma, presenting with fluctuating LE weakness and urinary retention, signed out AMA from [facility] and presented to [facility] for [facility] placement.

## 2022-09-16 NOTE — CONSULT NOTE ADULT - ASSESSMENT
32 year old with congential HIV/AIDS,  p[resents with weakness. He has multiple prior presentations for recurrent incontinence and lower extremity weakness.     1) Lower extremity weakness  He has had similar prior episodes.   Prior imaging/ LP were not diagnostic  HIV/ AIDS myelopathy was considered  HTLV negative in August      I am not convinced this is HIV myelopathy.  Prior CD4 counts have been higher- HIV pyelopathy often seen with more end stage disease.      In addition, he has had prior improvement in symptoms that has been more rapid than I would expect with HIV myelopathy.  Regardless, with HIV myelopathy the treatment is management of HIV.     Neurology following    2) HIV  Continue Biktarvy  Viral load at 12K   ? adherence  Repeat in 2 weeks    Follow up as outpt with Dr Hunter [phone]    Call ID service with questions

## 2022-09-16 NOTE — ED PROVIDER NOTE - PROGRESS NOTE DETAILS
Timothy Lewis, PGY-3- patient pending UA/Utox. Requesting straight cath, however, pt not amenable at this time. Will be admitted to medicine for further work up. Troponin is pending, however, suspicion for ACS is low. Pt asymptomatic and no CP at present. Will admit for [facility] placement. Pt ambulated from bathroom with assistance and with unsteady gait, likely 2/2 weakness.

## 2022-09-16 NOTE — ED PROVIDER NOTE - CLINICAL SUMMARY MEDICAL DECISION MAKING FREE TEXT BOX
Timothy Lewis, PGY-3- 32 year old male hx of HIV, on meds, GBS, here for persistent b/l LE weakness, unable to ambulate 2/2 weakness. Signed out AMA from [facility] because he wanted to be admitted to [facility]. Pt briefly reported CP, now resolved, no ACS risk factors. Plan to r/o electrolyte abnormality, rhabdo, acs, pna. No rectal tone on exam, pt reports unchanged from baseline. Has had MR earlier this month to r/o cord compression. Pt reports no new symptoms. Urinary/bowel incontinence intermittent at baseline and thought to be 2/2 hiv myelopathy, per pt. Pt is willing to be admitted and placed at [facility] due to weakness and inability to walk. Timothy Lewis, PGY-3- 32 year old male hx of HIV, on meds, GBS, here for persistent b/l LE weakness, unable to ambulate 2/2 weakness. Signed out AMA from [facility] because he wanted to be admitted to [facility]. Pt briefly reported CP, now resolved, no ACS risk factors. Plan to r/o electrolyte abnormality, rhabdo, acs, pna. No rectal tone on exam, pt reports unchanged from baseline. Has had MR earlier this month to r/o cord compression. Pt reports no new symptoms. Urinary/bowel incontinence intermittent at baseline and thought to be 2/2 hiv myelopathy, per pt. Pt is willing to be admitted and placed at [facility] due to weakness and inability to walk.    susan: pt here with leg weakness, inability to walk.  pt with extensive recent w/u in [facility], was being switched to neurology service for likely HIV meylopathy with plan to dc to [facility].  pt with congenital HIV, longstanding issues neurologically related to same.  left ama bc he did not want to go to the [facility] they were placing him in bc "a bunch of people  there recently".  pt here is amenable to exam and giving history.  alert and awake.  will likely need admission for [facility] placement.  signed out to oncoming attending at midnight all studies pending.

## 2022-09-16 NOTE — ED PROVIDER NOTE - ATTENDING CONTRIBUTION TO CARE
susan: pt here with leg weakness, inability to walk.  pt with extensive recent w/u in [facility], was being switched to neurology service for likely HIV meylopathy with plan to dc to [facility].  pt with congenital HIV, longstanding issues neurologically related to same.  left ama bc he did not want to go to the [facility] they were placing him in bc "a bunch of people  there recently".  pt here is amenable to exam and giving history.  alert and awake.  will likely need admission for [facility] placement.  signed out to oncoming attending at midnight all studies pending.    I performed a history and physical exam of the patient and discussed their management with the resident and /or advanced care provider. I reviewed the resident and /or ACP's note and agree with the documented findings and plan of care. My medical decison making and observations are found above.

## 2022-09-16 NOTE — DISCHARGE NOTE PROVIDER - NSFOLLOWUPCLINICS_GEN_ALL_ED_FT
[facility] Psych Dept of Substance Abuse  Psychiatry Substance Abuse  [address] [place] [address]  Phone: [phone]  Fax:

## 2022-09-16 NOTE — H&P ADULT - PROBLEM SELECTOR PLAN 1
MRI lumbar spines (6/5/21, 5/21/21, 5/2/21, 10/26/20). MRI cervical spine (5/2/21), MRI thoracic spine (5/2/21, 10/26/20), MRI head (3/25/20) , MR recently thoracic/lumbar negative  -CT head noncon recently negative for acute findings  -EMG/MR negative for signs of neuropathy/myelopathy  -LP negative prior  -PT evaluation for [facility] placement  -can obtain xray pelvis

## 2022-09-16 NOTE — PHYSICAL THERAPY INITIAL EVALUATION ADULT - LEVEL OF INDEPENDENCE: SIT/SUPINE, REHAB EVAL
- panorama: wnl  - has level 2 scheduled   - no complaints.  All questions answered    contact guard

## 2022-09-16 NOTE — H&P ADULT - ASSESSMENT
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

## 2022-09-17 LAB
ALBUMIN SERPL ELPH-MCNC: 4.1 G/DL — SIGNIFICANT CHANGE UP (ref 3.3–5)
ALP SERPL-CCNC: 57 U/L — SIGNIFICANT CHANGE UP (ref 40–120)
ALT FLD-CCNC: 11 U/L — SIGNIFICANT CHANGE UP (ref 4–41)
ANION GAP SERPL CALC-SCNC: 12 MMOL/L — SIGNIFICANT CHANGE UP (ref 7–14)
AST SERPL-CCNC: 16 U/L — SIGNIFICANT CHANGE UP (ref 4–40)
BILIRUB SERPL-MCNC: 0.6 MG/DL — SIGNIFICANT CHANGE UP (ref 0.2–1.2)
BUN SERPL-MCNC: 10 MG/DL — SIGNIFICANT CHANGE UP (ref 7–23)
CALCIUM SERPL-MCNC: 9.3 MG/DL — SIGNIFICANT CHANGE UP (ref 8.4–10.5)
CHLORIDE SERPL-SCNC: 104 MMOL/L — SIGNIFICANT CHANGE UP (ref 98–107)
CO2 SERPL-SCNC: 23 MMOL/L — SIGNIFICANT CHANGE UP (ref 22–31)
CREAT SERPL-MCNC: 0.78 MG/DL — SIGNIFICANT CHANGE UP (ref 0.5–1.3)
CULTURE RESULTS: SIGNIFICANT CHANGE UP
EGFR: 121 ML/MIN/1.73M2 — SIGNIFICANT CHANGE UP
GLUCOSE SERPL-MCNC: 79 MG/DL — SIGNIFICANT CHANGE UP (ref 70–99)
HIV-1 VIRAL LOAD RESULT: ABNORMAL
HIV1 RNA # SERPL NAA+PROBE: 510 — SIGNIFICANT CHANGE UP
HIV1 RNA SER-IMP: SIGNIFICANT CHANGE UP
HIV1 RNA SERPL NAA+PROBE-ACNC: ABNORMAL
HIV1 RNA SERPL NAA+PROBE-LOG#: 2.71 — SIGNIFICANT CHANGE UP
MAGNESIUM SERPL-MCNC: 1.5 MG/DL — LOW (ref 1.6–2.6)
POTASSIUM SERPL-MCNC: 4.2 MMOL/L — SIGNIFICANT CHANGE UP (ref 3.5–5.3)
POTASSIUM SERPL-SCNC: 4.2 MMOL/L — SIGNIFICANT CHANGE UP (ref 3.5–5.3)
PROT SERPL-MCNC: 7.3 G/DL — SIGNIFICANT CHANGE UP (ref 6–8.3)
SODIUM SERPL-SCNC: 139 MMOL/L — SIGNIFICANT CHANGE UP (ref 135–145)
SPECIMEN SOURCE: SIGNIFICANT CHANGE UP

## 2022-09-17 PROCEDURE — 99233 SBSQ HOSP IP/OBS HIGH 50: CPT

## 2022-09-17 RX ADMIN — Medication 50 MILLIGRAM(S): at 21:37

## 2022-09-17 RX ADMIN — Medication 50 MILLIGRAM(S): at 13:27

## 2022-09-17 RX ADMIN — QUETIAPINE FUMARATE 100 MILLIGRAM(S): 200 TABLET, FILM COATED ORAL at 08:58

## 2022-09-17 RX ADMIN — BICTEGRAVIR SODIUM, EMTRICITABINE, AND TENOFOVIR ALAFENAMIDE FUMARATE 1 TABLET(S): 30; 120; 15 TABLET ORAL at 13:27

## 2022-09-17 RX ADMIN — Medication 50 MILLIGRAM(S): at 06:30

## 2022-09-17 RX ADMIN — QUETIAPINE FUMARATE 200 MILLIGRAM(S): 200 TABLET, FILM COATED ORAL at 21:37

## 2022-09-17 RX ADMIN — Medication 3 MILLIGRAM(S): at 21:36

## 2022-09-17 NOTE — PROGRESS NOTE ADULT - ASSESSMENT
32M with congenital HIV, chronic back pain, ?HIV myelopathy and asthma, presenting with fluctuating LE weakness and urinary retention, signed out AMA from [facility] and presented to [facility] for [facility] placement.

## 2022-09-17 NOTE — PROGRESS NOTE ADULT - PROBLEM SELECTOR PLAN 1
MRI lumbar spines (6/5/21, 5/21/21, 5/2/21, 10/26/20). MRI cervical spine (5/2/21), MRI thoracic spine (5/2/21, 10/26/20), MRI head (3/25/20) , MR 9/2022 thoracic/lumbar negative  -CT head noncon recently negative for acute findings  -EMG/MR negative for signs of neuropathy/myelopathy  -LP negative prior  -PT recommended [facility]- awaiting placement  -case was discussed with neurology, they had signed off during last [facility] hospitalization and given no change in presentation there is no further workup required at this time MRI lumbar spines (6/5/21, 5/21/21, 5/2/21, 10/26/20). MRI cervical spine (5/2/21), MRI thoracic spine (5/2/21, 10/26/20), MRI head (3/25/20) , MR 9/2022 thoracic/lumbar negative  -CT head noncon recently negative for acute findings  -EMG/MR negative for signs of neuropathy/myelopathy  -LP negative prior  -PT recommended [facility]- awaiting placement  -case was discussed with neurology, they had signed off during last [facility] hospitalization and given no change in presentation there is no further workup required at this time  -C/w home meds, Baclofen 10mg, pregabalin 50 milliGRAM(s) Oral three times a day

## 2022-09-18 LAB
ANION GAP SERPL CALC-SCNC: 10 MMOL/L — SIGNIFICANT CHANGE UP (ref 7–14)
BUN SERPL-MCNC: 9 MG/DL — SIGNIFICANT CHANGE UP (ref 7–23)
CALCIUM SERPL-MCNC: 9.2 MG/DL — SIGNIFICANT CHANGE UP (ref 8.4–10.5)
CHLORIDE SERPL-SCNC: 108 MMOL/L — HIGH (ref 98–107)
CO2 SERPL-SCNC: 22 MMOL/L — SIGNIFICANT CHANGE UP (ref 22–31)
CREAT SERPL-MCNC: 0.89 MG/DL — SIGNIFICANT CHANGE UP (ref 0.5–1.3)
EGFR: 116 ML/MIN/1.73M2 — SIGNIFICANT CHANGE UP
GLUCOSE SERPL-MCNC: 95 MG/DL — SIGNIFICANT CHANGE UP (ref 70–99)
MAGNESIUM SERPL-MCNC: 1.3 MG/DL — LOW (ref 1.6–2.6)
PHOSPHATE SERPL-MCNC: 1.3 MG/DL — LOW (ref 2.5–4.5)
POTASSIUM SERPL-MCNC: 3.9 MMOL/L — SIGNIFICANT CHANGE UP (ref 3.5–5.3)
POTASSIUM SERPL-SCNC: 3.9 MMOL/L — SIGNIFICANT CHANGE UP (ref 3.5–5.3)
SODIUM SERPL-SCNC: 140 MMOL/L — SIGNIFICANT CHANGE UP (ref 135–145)

## 2022-09-18 PROCEDURE — 99232 SBSQ HOSP IP/OBS MODERATE 35: CPT

## 2022-09-18 RX ADMIN — QUETIAPINE FUMARATE 200 MILLIGRAM(S): 200 TABLET, FILM COATED ORAL at 22:03

## 2022-09-18 RX ADMIN — Medication 50 MILLIGRAM(S): at 17:29

## 2022-09-18 RX ADMIN — Medication 3 MILLIGRAM(S): at 22:03

## 2022-09-18 RX ADMIN — QUETIAPINE FUMARATE 100 MILLIGRAM(S): 200 TABLET, FILM COATED ORAL at 10:00

## 2022-09-18 RX ADMIN — Medication 50 MILLIGRAM(S): at 05:41

## 2022-09-18 RX ADMIN — BICTEGRAVIR SODIUM, EMTRICITABINE, AND TENOFOVIR ALAFENAMIDE FUMARATE 1 TABLET(S): 30; 120; 15 TABLET ORAL at 12:22

## 2022-09-18 NOTE — PROGRESS NOTE ADULT - PROBLEM SELECTOR PLAN 5
F-none  E-replete prn  N-regular diet  lovenox DVT prophylaxis  Dispo- [facility], pending placement, medically ready
F-none  E-replete prn  N-regular diet  lovenox DVT prophylaxis  Dispo- [facility], pending placement, medically ready
F-none  E-replete prn  N-regular diet  lovenox DVT prophylaxis  Dispo- [facility]

## 2022-09-18 NOTE — PROGRESS NOTE ADULT - PROBLEM SELECTOR PLAN 4
U tox positive  - SBIRT screening

## 2022-09-18 NOTE — PROGRESS NOTE ADULT - ASSESSMENT
32M with congenital HIV, chronic back pain, ?HIV myelopathy and asthma, presenting with fluctuating LE weakness and urinary retention, signed out AMA from [facility] and presented to [facility] for [facility] placement.

## 2022-09-18 NOTE — PROGRESS NOTE ADULT - SUBJECTIVE AND OBJECTIVE BOX
[facility] Division of Hospital Medicine  Jessica Arias MD  Available via MS Teams  Pager: [phone]    SUBJECTIVE / OVERNIGHT EVENTS:  No events. Patient lives alone and states he needs help and can't take care of himself alone anymore, looking forward to [facility]. Report weakness has stayed the same, not improving but hasn't worsen.     ADDITIONAL REVIEW OF SYSTEMS:  no chest pain, no palpitations, no cough/no shortness of breath, no nausea/vomiting/diarrhea    MEDICATIONS  (STANDING):  bictegravir 50 mG/emtricitabine 200 mG/tenofovir alafenamide 25 mG (BIKTARVY) 1 Tablet(s) Oral daily  enoxaparin Injectable 40 milliGRAM(s) SubCutaneous every 24 hours  polyethylene glycol 3350 17 Gram(s) Oral at bedtime  pregabalin 50 milliGRAM(s) Oral every 8 hours  QUEtiapine 100 milliGRAM(s) Oral every 24 hours  QUEtiapine 200 milliGRAM(s) Oral every 24 hours    MEDICATIONS  (PRN):  acetaminophen     Tablet .. 650 milliGRAM(s) Oral every 6 hours PRN Temp greater or equal to 38C (100.4F), Mild Pain (1 - 3)  ALBUTerol    90 MICROgram(s) HFA Inhaler 2 Puff(s) Inhalation every 6 hours PRN Shortness of Breath and/or Wheezing  baclofen 5 milliGRAM(s) Oral every 8 hours PRN Muscle Spasm  melatonin 3 milliGRAM(s) Oral at bedtime PRN Insomnia  oxyCODONE    IR 5 milliGRAM(s) Oral every 6 hours PRN Moderate Pain (4 - 6)  senna 2 Tablet(s) Oral at bedtime PRN Constipation      I&O's Summary      PHYSICAL EXAM:  Vital Signs Last 24 Hrs  T(C): 36.4 (17 Sep 2022 06:26), Max: 36.6 (16 Sep 2022 17:23)  T(F): 97.5 (17 Sep 2022 06:26), Max: 97.8 (16 Sep 2022 17:23)  HR: 55 (17 Sep 2022 06:26) (55 - 63)  BP: 104/51 (17 Sep 2022 06:26) (102/54 - 134/70)  BP(mean): --  RR: 17 (17 Sep 2022 06:26) (16 - 17)  SpO2: 99% (17 Sep 2022 06:26) (99% - 100%)    Parameters below as of 17 Sep 2022 06:26  Patient On (Oxygen Delivery Method): room air      CONSTITUTIONAL: NAD, well-developed, well-groomed  EYES: PERRLA; conjunctiva and sclera clear  RESPIRATORY: Normal respiratory effort; lungs are clear to auscultation bilaterally  CARDIOVASCULAR: Regular rate and rhythm, normal S1 and S2, no murmur/rub/gallop; No lower extremity edema; Peripheral pulses are 2+ bilaterally  ABDOMEN: Nontender to palpation, normoactive bowel sounds, no rebound/guarding  MUSCULOSKELETAL:  no clubbing or cyanosis of digits; no joint swelling or tenderness to palpation  PSYCH: A+O to person, place, and time; affect appropriate  NEUROLOGY: CN 2-12 are intact and symmetric; no gross sensory deficits   SKIN: No rashes; no palpable lesions    LABS:                        12.9   4.57  )-----------( 249      ( 16 Sep 2022 05:52 )             39.8     -    139  |  104  |  10  ----------------------------<  79  4.2   |  23  |  0.78    Ca    9.3      17 Sep 2022 08:43  Phos  3.7     09-  Mg     1.50     -    TPro  7.3  /  Alb  4.1  /  TBili  0.6  /  DBili  x   /  AST  16  /  ALT  11  /  AlkPhos  57  09-17      CARDIAC MARKERS ( 16 Sep 2022 01:10 )  x     / x     / 66 U/L / x     / x          Urinalysis Basic - ( 16 Sep 2022 04:20 )    Color: Yellow / Appearance: Clear / S.027 / pH: x  Gluc: x / Ketone: Negative  / Bili: Negative / Urobili: <2 mg/dL   Blood: x / Protein: Negative / Nitrite: Negative   Leuk Esterase: Negative / RBC: 1 /HPF / WBC 2 /HPF   Sq Epi: x / Non Sq Epi: 1 /HPF / Bacteria: Negative        Culture - Urine (collected 16 Sep 2022 04:20)  Source: Clean Catch Clean Catch (Midstream)  Final Report (17 Sep 2022 08:36):    <10,000 CFU/mL Normal Urogenital Jeimy      COVID-19 PCR: NotDetec (16 Sep 2022 06:48)  SARS-CoV-2: NotDetec (11 Sep 2022 23:31)  SARS-CoV-2: NotDetec (31 Aug 2022 05:46)  COVID-19 PCR: NotDetec (15 Aug 2022 09:10)  COVID-19 PCR: NotDetec (12 Aug 2022 18:30)  SARS-CoV-2: NotDetec (09 Aug 2022 04:13)  COVID-19 PCR: NotDetec (20 May 2022 23:45)      RADIOLOGY & ADDITIONAL TESTS:  No new results    COMMUNICATION:  Care Discussed with PA    
[facility] Division of Hospital Medicine  Jessica Arias MD  Available via MS Teams  Pager: [phone]    SUBJECTIVE / OVERNIGHT EVENTS:  No events. Patient has no complaints this morning. Looking forward to rehab placing, weakness has remained stable.     ADDITIONAL REVIEW OF SYSTEMS:  no fever, no chills, no SOB/cough, no nausea/vomiting/diarrhea    MEDICATIONS  (STANDING):  bictegravir 50 mG/emtricitabine 200 mG/tenofovir alafenamide 25 mG (BIKTARVY) 1 Tablet(s) Oral daily  enoxaparin Injectable 40 milliGRAM(s) SubCutaneous every 24 hours  polyethylene glycol 3350 17 Gram(s) Oral at bedtime  pregabalin 50 milliGRAM(s) Oral every 8 hours  QUEtiapine 100 milliGRAM(s) Oral every 24 hours  QUEtiapine 200 milliGRAM(s) Oral every 24 hours    MEDICATIONS  (PRN):  acetaminophen     Tablet .. 650 milliGRAM(s) Oral every 6 hours PRN Temp greater or equal to 38C (100.4F), Mild Pain (1 - 3)  ALBUTerol    90 MICROgram(s) HFA Inhaler 2 Puff(s) Inhalation every 6 hours PRN Shortness of Breath and/or Wheezing  baclofen 5 milliGRAM(s) Oral every 8 hours PRN Muscle Spasm  melatonin 3 milliGRAM(s) Oral at bedtime PRN Insomnia  oxyCODONE    IR 5 milliGRAM(s) Oral every 6 hours PRN Moderate Pain (4 - 6)  senna 2 Tablet(s) Oral at bedtime PRN Constipation      I&O's Summary      PHYSICAL EXAM:  Vital Signs Last 24 Hrs  T(C): 36.4 (18 Sep 2022 05:40), Max: 36.7 (17 Sep 2022 14:58)  T(F): 97.5 (18 Sep 2022 05:40), Max: 98 (17 Sep 2022 14:58)  HR: 65 (18 Sep 2022 05:40) (63 - 72)  BP: 126/68 (18 Sep 2022 05:40) (119/53 - 137/65)  BP(mean): --  RR: 16 (18 Sep 2022 05:40) (16 - 17)  SpO2: 99% (18 Sep 2022 05:40) (99% - 100%)    Parameters below as of 18 Sep 2022 05:40  Patient On (Oxygen Delivery Method): room air      CONSTITUTIONAL: NAD, well-developed, well-groomed  EYES: PERRLA; conjunctiva and sclera corinne  RESPIRATORY: Normal respiratory effort; lungs are clear to auscultation bilaterally  CARDIOVASCULAR: Regular rate and rhythm, normal S1 and S2, no murmur/rub/gallop; No lower extremity edema; Peripheral pulses are 2+ bilaterally  ABDOMEN: Nontender to palpation, normoactive bowel sounds, no rebound/guarding  MUSCULOSKELETAL:  no clubbing or cyanosis of digits; no joint swelling or tenderness to palpation  PSYCH: A+O to person, place, and time; affect appropriate  NEUROLOGY: CN 2-12 are intact and symmetric; no gross sensory deficits   SKIN: No rashes; no palpable lesions    LABS:    09-18    140  |  108<H>  |  9   ----------------------------<  95  3.9   |  22  |  0.89    Ca    9.2      18 Sep 2022 10:20  Phos  1.3     09-18  Mg     1.30     09-18    TPro  7.3  /  Alb  4.1  /  TBili  0.6  /  DBili  x   /  AST  16  /  ALT  11  /  AlkPhos  57  09-17              Culture - Urine (collected 16 Sep 2022 04:20)  Source: Clean Catch Clean Catch (Midstream)  Final Report (17 Sep 2022 08:36):    <10,000 CFU/mL Normal Urogenital Jeimy      COVID-19 PCR: NotDetec (16 Sep 2022 06:48)  SARS-CoV-2: NotDetec (11 Sep 2022 23:31)  SARS-CoV-2: NotDetec (31 Aug 2022 05:46)  COVID-19 PCR: NotDetec (15 Aug 2022 09:10)  COVID-19 PCR: NotDetec (12 Aug 2022 18:30)  SARS-CoV-2: NotDetec (09 Aug 2022 04:13)  COVID-19 PCR: NotDetec (20 May 2022 23:45)      RADIOLOGY & ADDITIONAL TESTS:  No new results    COMMUNICATION:  Care Discussed with Consultants/Other Providers and Details of Discussion: PA  Discussions with Patient/Family: plan of care discussed with patient at bedside    
[facility] Division of Hospital Medicine  Natalie Law MD  Available via MS Teams    SUBJECTIVE / OVERNIGHT EVENTS: No acute overnight events. This AM pt complains of back pain related to laying on the stretcher. Denies abd pain, n/v/d, fever chills. Also reports feeling tired because he is having trouble sleeping in the bed.    ADDITIONAL REVIEW OF SYSTEMS: Negative    MEDICATIONS  (STANDING):  bictegravir 50 mG/emtricitabine 200 mG/tenofovir alafenamide 25 mG (BIKTARVY) 1 Tablet(s) Oral daily  enoxaparin Injectable 40 milliGRAM(s) SubCutaneous every 24 hours  polyethylene glycol 3350 17 Gram(s) Oral at bedtime  pregabalin 50 milliGRAM(s) Oral every 8 hours  QUEtiapine 100 milliGRAM(s) Oral every 24 hours  QUEtiapine 200 milliGRAM(s) Oral every 24 hours    MEDICATIONS  (PRN):  acetaminophen     Tablet .. 650 milliGRAM(s) Oral every 6 hours PRN Temp greater or equal to 38C (100.4F), Mild Pain (1 - 3)  ALBUTerol    90 MICROgram(s) HFA Inhaler 2 Puff(s) Inhalation every 6 hours PRN Shortness of Breath and/or Wheezing  baclofen 5 milliGRAM(s) Oral every 8 hours PRN Muscle Spasm  melatonin 3 milliGRAM(s) Oral at bedtime PRN Insomnia  oxyCODONE    IR 5 milliGRAM(s) Oral every 6 hours PRN Moderate Pain (4 - 6)  senna 2 Tablet(s) Oral at bedtime PRN Constipation      I&O's Summary      PHYSICAL EXAM:  Vital Signs Last 24 Hrs  T(C): 36.4 (16 Sep 2022 13:22), Max: 36.7 (15 Sep 2022 22:34)  T(F): 97.6 (16 Sep 2022 13:22), Max: 98.1 (15 Sep 2022 22:34)  HR: 63 (16 Sep 2022 13:22) (61 - 92)  BP: 102/54 (16 Sep 2022 13:22) (102/54 - 122/78)  BP(mean): --  RR: 16 (16 Sep 2022 13:22) (16 - 17)  SpO2: 100% (16 Sep 2022 13:22) (97% - 100%)    Parameters below as of 16 Sep 2022 13:22  Patient On (Oxygen Delivery Method): room air      CONSTITUTIONAL: NAD, well-developed, well-groomed  EYES: PERRLA; conjunctiva and sclera clear  RESPIRATORY: Normal respiratory effort; lungs are clear to auscultation bilaterally  CARDIOVASCULAR: Regular rate and rhythm, normal S1 and S2, no murmur/rub/gallop; No lower extremity edema; Peripheral pulses are 2+ bilaterally  ABDOMEN: Nontender to palpation, normoactive bowel sounds, no rebound/guarding; No hepatosplenomegaly  MUSCULOSKELETAL:   no clubbing or cyanosis of digits; no joint swelling or tenderness to palpation; 4/5 strength LLE to dorsiflexion, and LLE quadriceps. All other muscle groups 5/5 strength  PSYCH: A+O to person, place, and time; affect appropriate  NEUROLOGY: CN 2-12 are intact and symmetric; no gross sensory deficits   SKIN: No rashes; no palpable lesions    LABS:                        12.9   4.57  )-----------( 249      ( 16 Sep 2022 05:52 )             39.8     Hb Trend: 12.9<--, 12.7<--, 13.0<--, 12.9<--, 11.8<--  09-    137  |  101  |  17  ----------------------------<  96  3.4<L>   |  24  |  0.99    Ca    9.2      16 Sep 2022 05:52  Phos  3.7     -  Mg     1.50         TPro  7.5  /  Alb  4.2  /  TBili  0.3  /  DBili  x   /  AST  18  /  ALT  10  /  AlkPhos  62  09-16    Creatinine Trend: 0.99<--, 1.01<--, 0.90<--, 0.82<--, 0.78<--, 0.95<--    CARDIAC MARKERS ( 16 Sep 2022 01:10 )  x     / x     / 66 U/L / x     / x          Urinalysis Basic - ( 16 Sep 2022 04:20 )    Color: Yellow / Appearance: Clear / S.027 / pH: x  Gluc: x / Ketone: Negative  / Bili: Negative / Urobili: <2 mg/dL   Blood: x / Protein: Negative / Nitrite: Negative   Leuk Esterase: Negative / RBC: 1 /HPF / WBC 2 /HPF   Sq Epi: x / Non Sq Epi: 1 /HPF / Bacteria: Negative        COVID-19 PCR: NotDetec (16 Sep 2022 06:48)  SARS-CoV-2: NotDetec (11 Sep 2022 23:31)  SARS-CoV-2: NotDetec (31 Aug 2022 05:46)  COVID-19 PCR: NotDetec (15 Aug 2022 09:10)  COVID-19 PCR: NotDetec (12 Aug 2022 18:30)  SARS-CoV-2: NotDetec (09 Aug 2022 04:13)  COVID-19 PCR: NotDetec (20 May 2022 23:45)      RADIOLOGY & ADDITIONAL TESTS:  N/A    COORDINATION OF CARE:  N/A

## 2022-09-18 NOTE — PROGRESS NOTE ADULT - PROBLEM SELECTOR PLAN 2
-c/w biktarvy  - seen by ID, repeat viral load in 2 weeks
-c/w biktarvy  - ID consult given elevated VL
-c/w biktarvy  - seen by ID, repeat viral load in 2 weeks

## 2022-09-18 NOTE — PROGRESS NOTE ADULT - PROBLEM SELECTOR PLAN 1
MRI lumbar spines (6/5/21, 5/21/21, 5/2/21, 10/26/20). MRI cervical spine (5/2/21), MRI thoracic spine (5/2/21, 10/26/20), MRI head (3/25/20) , MR 9/2022 thoracic/lumbar negative  -CT head noncon recently negative for acute findings  -EMG/MR negative for signs of neuropathy/myelopathy  -LP negative prior  -PT recommended [facility]- awaiting placement  -case was discussed with neurology, they had signed off during last [facility] hospitalization and given no change in presentation there is no further workup required at this time  -C/w home meds, Baclofen 10mg, pregabalin 50 milliGRAM(s) Oral three times a day

## 2022-09-19 VITALS
HEART RATE: 65 BPM | DIASTOLIC BLOOD PRESSURE: 55 MMHG | SYSTOLIC BLOOD PRESSURE: 105 MMHG | RESPIRATION RATE: 16 BRPM | TEMPERATURE: 98 F | OXYGEN SATURATION: 100 %

## 2022-09-19 DIAGNOSIS — G05.3 ENCEPHALITIS AND ENCEPHALOMYELITIS IN DISEASES CLASSIFIED ELSEWHERE: ICD-10-CM

## 2022-09-19 DIAGNOSIS — E87.6 HYPOKALEMIA: ICD-10-CM

## 2022-09-19 DIAGNOSIS — B20 HUMAN IMMUNODEFICIENCY VIRUS [HIV] DISEASE: ICD-10-CM

## 2022-09-19 DIAGNOSIS — F43.10 POST-TRAUMATIC STRESS DISORDER, UNSPECIFIED: ICD-10-CM

## 2022-09-19 DIAGNOSIS — D72.819 DECREASED WHITE BLOOD CELL COUNT, UNSPECIFIED: ICD-10-CM

## 2022-09-19 DIAGNOSIS — R33.9 RETENTION OF URINE, UNSPECIFIED: ICD-10-CM

## 2022-09-19 DIAGNOSIS — E83.51 HYPOCALCEMIA: ICD-10-CM

## 2022-09-19 DIAGNOSIS — E83.42 HYPOMAGNESEMIA: ICD-10-CM

## 2022-09-19 DIAGNOSIS — G99.2 MYELOPATHY IN DISEASES CLASSIFIED ELSEWHERE: ICD-10-CM

## 2022-09-19 DIAGNOSIS — D63.8 ANEMIA IN OTHER CHRONIC DISEASES CLASSIFIED ELSEWHERE: ICD-10-CM

## 2022-09-19 DIAGNOSIS — F51.4 SLEEP TERRORS [NIGHT TERRORS]: ICD-10-CM

## 2022-09-19 LAB — SARS-COV-2 RNA SPEC QL NAA+PROBE: SIGNIFICANT CHANGE UP

## 2022-09-19 RX ORDER — MAGNESIUM SULFATE 500 MG/ML
2 VIAL (ML) INJECTION ONCE
Refills: 0 | Status: DISCONTINUED | OUTPATIENT
Start: 2022-09-19 | End: 2022-09-16

## 2022-09-19 RX ADMIN — Medication 50 MILLIGRAM(S): at 05:22

## 2022-09-19 RX ADMIN — QUETIAPINE FUMARATE 100 MILLIGRAM(S): 200 TABLET, FILM COATED ORAL at 08:35

## 2022-09-19 NOTE — CHART NOTE - NSCHARTNOTEFT_GEN_A_CORE
pt seen and examined by me  pt states his parents found somewhere for him to say and he can go to outpt PT  pt states he has a prescription for outpt PT from previous admission  d/w pt will confer with SW to solidify dc plans and plan for dc later today  VSS  CHEST b/l AE  a/w weakness   plan for rehab  pt with congen HIV  called by RN, pt not in room, belongings gone and IV in bed  pt has capacity  pt left AMA without signing  dc planning was in progess  pt did not communicate with team his urgent desire to leave  room noted unattended, pt no longer present  33 min spent with dc planning

## 2022-09-20 NOTE — ED ADULT NURSE NOTE - CAS TRG GENERAL AIRWAY, MLM
Pt came today for 6MW. When he checked in he gave me \"Americans Disability Act (ADA) Employee Accommodation Medical Certification Form\" that Dr. Melissa Farfan needed to complete after his test.    Put form in Marquis's cubby for it to be completed. Patent

## 2022-09-21 NOTE — PATIENT PROFILE ADULT - INTERNATIONAL TRAVEL
CRNA  Romy Bach No

## 2022-09-23 NOTE — CHART NOTE - NSCHARTNOTEFT_GEN_A_CORE
received message from infection control pt was exposed to covid while in the hospital  pt left AMA and is homeless  attempted to call number in sunrise, wrong number  unable to reach pt

## 2022-09-24 ENCOUNTER — EMERGENCY (EMERGENCY)
Facility: HOSPITAL | Age: 32
LOS: 1 days | Discharge: LEFT BEFORE DISCHARGE | End: 2022-09-24
Attending: EMERGENCY MEDICINE | Admitting: STUDENT IN AN ORGANIZED HEALTH CARE EDUCATION/TRAINING PROGRAM
Payer: MEDICAID

## 2022-09-24 VITALS
SYSTOLIC BLOOD PRESSURE: 116 MMHG | TEMPERATURE: 98 F | RESPIRATION RATE: 18 BRPM | OXYGEN SATURATION: 98 % | DIASTOLIC BLOOD PRESSURE: 79 MMHG | HEART RATE: 90 BPM | HEIGHT: 72 IN

## 2022-09-24 DIAGNOSIS — Z98.890 OTHER SPECIFIED POSTPROCEDURAL STATES: Chronic | ICD-10-CM

## 2022-09-24 LAB
ALBUMIN SERPL ELPH-MCNC: 3.6 G/DL — SIGNIFICANT CHANGE UP (ref 3.3–5)
ALP SERPL-CCNC: 73 U/L — SIGNIFICANT CHANGE UP (ref 40–120)
ALT FLD-CCNC: 18 U/L — SIGNIFICANT CHANGE UP (ref 12–78)
ANION GAP SERPL CALC-SCNC: 6 MMOL/L — SIGNIFICANT CHANGE UP (ref 5–17)
AST SERPL-CCNC: 17 U/L — SIGNIFICANT CHANGE UP (ref 15–37)
BASOPHILS # BLD AUTO: 0.02 K/UL — SIGNIFICANT CHANGE UP (ref 0–0.2)
BASOPHILS NFR BLD AUTO: 0.4 % — SIGNIFICANT CHANGE UP (ref 0–2)
BILIRUB SERPL-MCNC: 0.2 MG/DL — SIGNIFICANT CHANGE UP (ref 0.2–1.2)
BUN SERPL-MCNC: 13 MG/DL — SIGNIFICANT CHANGE UP (ref 7–23)
CALCIUM SERPL-MCNC: 9.1 MG/DL — SIGNIFICANT CHANGE UP (ref 8.5–10.1)
CHLORIDE SERPL-SCNC: 109 MMOL/L — HIGH (ref 96–108)
CO2 SERPL-SCNC: 27 MMOL/L — SIGNIFICANT CHANGE UP (ref 22–31)
CREAT SERPL-MCNC: 0.94 MG/DL — SIGNIFICANT CHANGE UP (ref 0.5–1.3)
EGFR: 110 ML/MIN/1.73M2 — SIGNIFICANT CHANGE UP
EOSINOPHIL # BLD AUTO: 0.22 K/UL — SIGNIFICANT CHANGE UP (ref 0–0.5)
EOSINOPHIL NFR BLD AUTO: 4.1 % — SIGNIFICANT CHANGE UP (ref 0–6)
FLUAV AG NPH QL: SIGNIFICANT CHANGE UP
FLUBV AG NPH QL: SIGNIFICANT CHANGE UP
GLUCOSE SERPL-MCNC: 115 MG/DL — HIGH (ref 70–99)
HCT VFR BLD CALC: 41.8 % — SIGNIFICANT CHANGE UP (ref 39–50)
HGB BLD-MCNC: 13.6 G/DL — SIGNIFICANT CHANGE UP (ref 13–17)
IMM GRANULOCYTES NFR BLD AUTO: 0 % — SIGNIFICANT CHANGE UP (ref 0–0.9)
LYMPHOCYTES # BLD AUTO: 2.46 K/UL — SIGNIFICANT CHANGE UP (ref 1–3.3)
LYMPHOCYTES # BLD AUTO: 45.8 % — HIGH (ref 13–44)
MCHC RBC-ENTMCNC: 29.2 PG — SIGNIFICANT CHANGE UP (ref 27–34)
MCHC RBC-ENTMCNC: 32.5 GM/DL — SIGNIFICANT CHANGE UP (ref 32–36)
MCV RBC AUTO: 89.9 FL — SIGNIFICANT CHANGE UP (ref 80–100)
MONOCYTES # BLD AUTO: 0.35 K/UL — SIGNIFICANT CHANGE UP (ref 0–0.9)
MONOCYTES NFR BLD AUTO: 6.5 % — SIGNIFICANT CHANGE UP (ref 2–14)
NEUTROPHILS # BLD AUTO: 2.32 K/UL — SIGNIFICANT CHANGE UP (ref 1.8–7.4)
NEUTROPHILS NFR BLD AUTO: 43.2 % — SIGNIFICANT CHANGE UP (ref 43–77)
NRBC # BLD: 0 /100 WBCS — SIGNIFICANT CHANGE UP (ref 0–0)
PLATELET # BLD AUTO: 274 K/UL — SIGNIFICANT CHANGE UP (ref 150–400)
POTASSIUM SERPL-MCNC: 3.7 MMOL/L — SIGNIFICANT CHANGE UP (ref 3.5–5.3)
POTASSIUM SERPL-SCNC: 3.7 MMOL/L — SIGNIFICANT CHANGE UP (ref 3.5–5.3)
PROT SERPL-MCNC: 8.2 G/DL — SIGNIFICANT CHANGE UP (ref 6–8.3)
RBC # BLD: 4.65 M/UL — SIGNIFICANT CHANGE UP (ref 4.2–5.8)
RBC # FLD: 12.8 % — SIGNIFICANT CHANGE UP (ref 10.3–14.5)
RSV RNA NPH QL NAA+NON-PROBE: SIGNIFICANT CHANGE UP
SARS-COV-2 RNA SPEC QL NAA+PROBE: SIGNIFICANT CHANGE UP
SODIUM SERPL-SCNC: 142 MMOL/L — SIGNIFICANT CHANGE UP (ref 135–145)
WBC # BLD: 5.37 K/UL — SIGNIFICANT CHANGE UP (ref 3.8–10.5)
WBC # FLD AUTO: 5.37 K/UL — SIGNIFICANT CHANGE UP (ref 3.8–10.5)

## 2022-09-24 PROCEDURE — 71045 X-RAY EXAM CHEST 1 VIEW: CPT | Mod: 26

## 2022-09-24 PROCEDURE — 99284 EMERGENCY DEPT VISIT MOD MDM: CPT

## 2022-09-24 NOTE — ED ADULT TRIAGE NOTE - CHIEF COMPLAINT QUOTE
[facility]. recent AMA from [facility] per ems. hx of HIV, eleanor magana.

## 2022-09-24 NOTE — ED CLERICAL - NS ED CARE COORDINATION INFORMATION
This patient is eligible for (or currently enrolled in) an outpatient care management program available through [employer]. This program can coordinate outpatient follow up and assist the patient in accessing a variety of outpatient resources.  If discharged from the ED, the patient will be contacted to see if any additional resources are needed.                                                                                    Please call the Nurse Clinical Call Center at [phone] with any questions or for assistance in discharge planning.

## 2022-09-24 NOTE — ED PROVIDER NOTE - PROGRESS NOTE DETAILS
received sign out on this patient who is pending SW for rehab placement. Sundar Trejo MD. patient not found in his stretcher, had been pending sw eval for placement. did not have iv access. eloped from ed. Sundar Trejo MD.

## 2022-09-24 NOTE — ED PROVIDER NOTE - OBJECTIVE STATEMENT
This patient is a 32-year-old male with a history of HIV disease poorly treated as well as drug abuse patient states he has had semilongstanding Guillain-Barré syndrome of the lower extremities with the leg weakness.  Patient was in [facility] until yesterday when he left AGAINST MEDICAL ADVICE despite not having physical therapy arranged.  Patient denies any new weakness fevers, chills or any other symptoms but comes to the ER requesting social work and help placement for rehab or physical therapy.  Patient is currently denying any pain or any other symptoms.  As per note from nurse from other institution patient was exposed to COVID-patient but denies any symptoms.  Patient is vaccinated for COVID

## 2022-09-24 NOTE — ED PROVIDER NOTE - CLINICAL SUMMARY MEDICAL DECISION MAKING FREE TEXT BOX
Patient presents to the emergency room after muscle multiple hospitalizations and signing out AMA after being unsatisfied with physical therapy set up or rehab set up.  Patient denies any new symptoms.  Labs check and COVID checked and within normal limits patient medically cleared to go to rehab or physical therapy for treatment of his leg weakness as per old charts.  We will get social work and PT consult morning

## 2022-09-25 LAB
APPEARANCE UR: CLEAR — SIGNIFICANT CHANGE UP
BILIRUB UR-MCNC: NEGATIVE — SIGNIFICANT CHANGE UP
COLOR SPEC: YELLOW — SIGNIFICANT CHANGE UP
DIFF PNL FLD: NEGATIVE — SIGNIFICANT CHANGE UP
GLUCOSE UR QL: NEGATIVE — SIGNIFICANT CHANGE UP
KETONES UR-MCNC: ABNORMAL
LEUKOCYTE ESTERASE UR-ACNC: NEGATIVE — SIGNIFICANT CHANGE UP
NITRITE UR-MCNC: NEGATIVE — SIGNIFICANT CHANGE UP
PH UR: 5 — SIGNIFICANT CHANGE UP (ref 5–8)
PROT UR-MCNC: NEGATIVE — SIGNIFICANT CHANGE UP
SP GR SPEC: 1.02 — SIGNIFICANT CHANGE UP (ref 1.01–1.02)
UROBILINOGEN FLD QL: NEGATIVE — SIGNIFICANT CHANGE UP

## 2022-09-25 NOTE — ED ADULT NURSE NOTE - OBJECTIVE STATEMENT
pt BIBA from [facility] c/o weakness x1 month.  as per EMS and prior medical records, pt has been in and out of multiple hospitals and has signed himself AMA multiple times.  pt just left AMA from [facility] approx 3 days ago, admitted at [facility] earlier this month and left AMA then as well.  pt states he is homeless and wants to speak with social work

## 2022-09-25 NOTE — ED ADULT NURSE NOTE - CHIEF COMPLAINT QUOTE
[facility]. recent AMA from [facility] per ems. hx of HIV, eleanor magana.

## 2022-09-25 NOTE — ED ADULT NURSE REASSESSMENT NOTE - NS ED NURSE REASSESS COMMENT FT1
9/25/22   0726:   Received pt from outgoing RN resting in stretcher.   Pt alert and oriented, denies pain at this time.   Respirations even and unlabored.  Pt denies cp/sob/palpitations.   Pt denies need for pain medications.   Pt  pt does however report "spasming" to his BL legs.  Pt states he occasionally uses a cane or walker or even wheelchair at home but admits his friend drove him to [employer] today and he was able to ambulate.   call bell in place pt instructed not to ambulate independently. BN   1214:  Pt assisted oob to ambulate to BR at this time x 1 assist, slow gait though pt requires minimal assist.  Pt previously eval'd by PT and SW aware of his case.   Safety maintained. BN 9/25/22   0726:   Received pt from outgoing RN resting in stretcher.   Pt alert and oriented, denies pain at this time.   Respirations even and unlabored.  Pt denies cp/sob/palpitations.   Pt denies need for pain medications.   Pt  pt does however report "spasming" to his BL legs.  Pt states he occasionally uses a cane or walker or even wheelchair at home but admits his friend drove him to [employer] today and he was able to ambulate.   call bell in place pt instructed not to ambulate independently. BN   1214:  Pt assisted oob to ambulate to BR at this time x 1 assist, slow gait though pt requires minimal assist.  Pt previously eval'd by PT and SW aware of his case.   Safety maintained. BN  1811: Pt tolerating dinner at this time, no complaints.  Safety maintained. BN

## 2022-09-25 NOTE — ED ADULT NURSE REASSESSMENT NOTE - NS ED NURSE REASSESS COMMENT FT1
1340hrs- patient states that he wants to go out to get fresh air- explained to patient that he cannot go out with an IV access- patient pulls out IV access by himself and walks out to get some air- patient confronted by security as patient was found to be smoking weed in the premises. EXplained to patient by security that smoking is not allowed  within the premises.

## 2022-09-25 NOTE — PHYSICAL THERAPY INITIAL EVALUATION ADULT - ADDITIONAL COMMENTS
Pt stated that he lives in a shelter and used a straight cane. pt stating people at the shelter had to assist him with ADLs.

## 2022-09-26 VITALS
DIASTOLIC BLOOD PRESSURE: 70 MMHG | RESPIRATION RATE: 17 BRPM | OXYGEN SATURATION: 100 % | SYSTOLIC BLOOD PRESSURE: 113 MMHG | TEMPERATURE: 98 F | HEART RATE: 57 BPM

## 2022-09-26 RX ORDER — LANOLIN ALCOHOL/MO/W.PET/CERES
3 CREAM (GRAM) TOPICAL AT BEDTIME
Refills: 0 | Status: DISCONTINUED | OUTPATIENT
Start: 2022-09-26 | End: 2022-09-26

## 2022-09-26 RX ADMIN — Medication 3 MILLIGRAM(S): at 01:16

## 2022-09-26 NOTE — ED ADULT NURSE REASSESSMENT NOTE - NS ED NURSE REASSESS COMMENT FT1
Pt c/o difficulty sleeping ED provider notified and okay for order to be placed, patient medicated will continue to monitor pt.

## 2022-09-27 PROCEDURE — 71045 X-RAY EXAM CHEST 1 VIEW: CPT

## 2022-09-27 PROCEDURE — 97161 PT EVAL LOW COMPLEX 20 MIN: CPT

## 2022-09-27 PROCEDURE — 81003 URINALYSIS AUTO W/O SCOPE: CPT

## 2022-09-27 PROCEDURE — 80053 COMPREHEN METABOLIC PANEL: CPT

## 2022-09-27 PROCEDURE — 99284 EMERGENCY DEPT VISIT MOD MDM: CPT | Mod: 25

## 2022-09-27 PROCEDURE — 85025 COMPLETE CBC W/AUTO DIFF WBC: CPT

## 2022-09-27 PROCEDURE — 87637 SARSCOV2&INF A&B&RSV AMP PRB: CPT

## 2022-09-27 PROCEDURE — 36415 COLL VENOUS BLD VENIPUNCTURE: CPT

## 2022-10-12 NOTE — PHYSICAL THERAPY INITIAL EVALUATION ADULT - ASSISTIVE DEVICE:SIT/SUPINE, REHAB EVAL
Requested Prescriptions     Pending Prescriptions Disp Refills    propranoloL (INDERAL) 20 mg tablet       Sig: Take 1 Tablet by mouth two (2) times a day. warfarin (Coumadin) 4 mg tablet       Sig: Take 1 Tablet by mouth daily.      Patient also says that he needs spirolactone but it was not showing up in his med list bed rails

## 2022-10-14 ENCOUNTER — EMERGENCY (EMERGENCY)
Facility: HOSPITAL | Age: 32
LOS: 0 days | Discharge: HOME | End: 2022-10-15
Attending: EMERGENCY MEDICINE
Payer: MEDICAID

## 2022-10-14 VITALS
SYSTOLIC BLOOD PRESSURE: 127 MMHG | OXYGEN SATURATION: 98 % | TEMPERATURE: 98 F | WEIGHT: 184.97 LBS | HEART RATE: 111 BPM | DIASTOLIC BLOOD PRESSURE: 95 MMHG | HEIGHT: 72 IN | RESPIRATION RATE: 16 BRPM

## 2022-10-14 DIAGNOSIS — B20 HUMAN IMMUNODEFICIENCY VIRUS [HIV] DISEASE: ICD-10-CM

## 2022-10-14 DIAGNOSIS — J45.909 UNSPECIFIED ASTHMA, UNCOMPLICATED: ICD-10-CM

## 2022-10-14 DIAGNOSIS — Z86.59 PERSONAL HISTORY OF OTHER MENTAL AND BEHAVIORAL DISORDERS: ICD-10-CM

## 2022-10-14 DIAGNOSIS — R53.1 WEAKNESS: ICD-10-CM

## 2022-10-14 DIAGNOSIS — Z59.00 HOMELESSNESS UNSPECIFIED: ICD-10-CM

## 2022-10-14 DIAGNOSIS — Z88.6 ALLERGY STATUS TO ANALGESIC AGENT: ICD-10-CM

## 2022-10-14 DIAGNOSIS — Z98.890 OTHER SPECIFIED POSTPROCEDURAL STATES: Chronic | ICD-10-CM

## 2022-10-14 DIAGNOSIS — Z88.1 ALLERGY STATUS TO OTHER ANTIBIOTIC AGENTS STATUS: ICD-10-CM

## 2022-10-14 PROCEDURE — 99282 EMERGENCY DEPT VISIT SF MDM: CPT

## 2022-10-14 PROCEDURE — 99284 EMERGENCY DEPT VISIT MOD MDM: CPT

## 2022-10-14 SDOH — ECONOMIC STABILITY - HOUSING INSECURITY: HOMELESSNESS UNSPECIFIED: Z59.00

## 2022-10-14 NOTE — ED ADULT TRIAGE NOTE - CHIEF COMPLAINT QUOTE
Patient BIBEMS with c/o weakness and pain in legs. As per EMS "patient was found sitting at the gas station. This is a flare up of what he has been experiencing for 2 weeks. Patient walked from train station to gas station. Patient is HIV +." Patient reports "I am having trouble walking and has been going on for a few weeks. I am having trouble taking care of myself. I was hospitalized at [facility] last week for the same thing, was advised to go to rehab but did not." Denies CP, SOB, fevers. Patient endorses numbness and tingling to lower extremities.

## 2022-10-15 VITALS
HEART RATE: 95 BPM | TEMPERATURE: 98 F | SYSTOLIC BLOOD PRESSURE: 126 MMHG | OXYGEN SATURATION: 100 % | DIASTOLIC BLOOD PRESSURE: 81 MMHG | RESPIRATION RATE: 18 BRPM

## 2022-10-15 NOTE — ED PROVIDER NOTE - INTERNATIONAL TRAVEL
April 10, 2019      RE: Krissy Weldon  [address] [address] [address]       To whom it may concern:    Krissy Weldon was seen in our clinic today. Ms. 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 This affects swelling in her joints, tendonitis, tendon tears in her history and all day stiffness and pain in her joints and tendons, and pain in her feet (impacting her walking). Over the years, she has had a very difficult case and has failed numerous medications.     We do not feel she is able to return to work. The obstacles to return to work are her pain, hand/wrist weakness, difficulty with fine motor, using her hands notably are hard, and general function. She has back/neck disease with severe low back pain now radiating to her right leg for which she seeing an pain specialist for and undergoing workup and continued care. This disease results in intermittent flares of variable intensity and daily chronic pain. She is under the treatment of multiple providers for her disease, is on immunosuppressive therapy, and has had numerous surgeries and injections over the years due to her illnesses. She is a very nice and compliant patient who is an active participant in her care. We would not want her lifting >5 lbs if that, and not able to stand or sit for long periods of time. Thus, I have indicated sedentary, as she does not lift up to 10 lbs frequently or would she be able to push or pull, but she does walk.     Miguel CABRAL, CNP, MSN with Dr. Jensen Samayoa   [facility]  Department of Rheumatology & Autoimmune Disorders  [facility]: [phone] Opt 2, then Opt 1 Scheduling                 No

## 2022-10-15 NOTE — ED ADULT NURSE NOTE - CHIEF COMPLAINT QUOTE
Patient BIBEMS with c/o weakness and pain in legs. As per EMS "patient was found sitting at the gas station. This is a flare up of what he has been experiencing for 2 weeks. Patient walked from train station to gas station. Patient is HIV +." Patient reports "I am having trouble walking and has been going on for a few weeks. I am having trouble taking care of myself. I was hospitalized at [place] last week for the same thing, was advised to go to rehab but did not." Denies CP, SOB, fevers. Patient endorses numbness and tingling to lower extremities.

## 2022-10-15 NOTE — ED PROVIDER NOTE - OBJECTIVE STATEMENT
31 y/o male in ED c/o chronic persistent weakness x months.   pt states has been seen and admitted at various hospitals in the past but refused rehab.   pt states just left [facility] but now in HH requesting PT/SW for possible rehab.

## 2022-10-15 NOTE — ED ADULT NURSE NOTE - OBJECTIVE STATEMENT
Patient BIBEMS with c/o weakness and pain in legs. As per EMS "patient was found sitting at the gas station. This is a flare up of what he has been experiencing for 2 weeks. Patient walked from train station to gas station. Patient is HIV +." Patient reports "I am having trouble walking and has been going on for a few weeks. I am having trouble taking care of myself. I was hospitalized at [place] last week for the same thing, was advised to go to rehab but did not." Denies CP, SOB, fevers. Patient endorses numbness and tingling to lower extremities. MD bedside, PT and SW ordered for the morning. Pt asleep, respirations even and unlabored.

## 2022-10-15 NOTE — ED PROVIDER NOTE - PATIENT PORTAL LINK FT
You can access the FollowMyHealth Patient Portal offered by [facility] by registering at the following website: [email or web address]. By joining [employer]’s FollowMyHealth portal, you will also be able to view your health information using other applications (apps) compatible with our system.

## 2022-11-04 NOTE — ED ADULT NURSE NOTE - CAS DISCH TRANSFER METHOD
"[facility]  Palliative Medicine  Progress Note    Patient Name: Ronn Caballero Jr.  MRN: [id number]  Admission Date: 10/29/2022  Hospital Length of Stay: 6 days  Code Status: DNR   Attending Provider: Jefferson Mehta MD  Consulting Provider: Yung Posada MD  Primary Care Physician: JELLY Decker  Principal Problem:Dementia with behavioral disturbance    Patient information was obtained from patient, spouse/SO, relative(s), past medical records and primary team.      Assessment/Plan:     Goals of care, counseling/discussion  I met with Mr. Ash at bedside. He lacks capacity for complex medical decision making. I met with his wife, sister-in-law, and brother-in-law outside of his room.    They all have an accurate understanding of his current medical decision making.     Ms. Caballero is quite anxious today. She had many concerns. Namely she is concerned about "having to make hard decisions" in the coming days. I explored that a bit. She explains that even prior to hospitalization his quality of life might be described as suffering. I shared my concern that even in the best case scenario he will not get back to his previous state of being. She understood and was not surprised by this news. She also explains she wants to ensure she has given him every opportunity to get better.     We spoke back and forth. I made a recommendation that we continue current measures as a time limited trial. If he is making progress towards a reasonable quality of life then we can reevaluate the path ahead. If, on the other hand, he is not making progress towards a reasonable quality of life early next week then my recommendation would be to pursue hospice. She understood and agreed. She requested that I meet with her and her children Tuesday morning so that they can all be in agreement on the path forward. I agreed.     Of note, I did tell her that his condition could change over the weekend and " "recommendations might be made by his primary team; particularly if his condition deteriorates. She understood.     I hope I have been helpful. I will follow up next week.          I will follow-up with patient. Please contact us if you have any additional questions.    Subjective:     Chief Complaint:   Chief Complaint   Patient presents with    Dementia     From [place]        HPI:   Per admission H&P:    "68 yo male with h/o thyroid cancer, frontal temporal lobe dementia, HTN, HLP, depression and anxiety transferred from [place] behavioral inpatient service due to 6 days of failure to thrive. It is reported from the facility that he was PEC/CEC (expires 11/3/22) due to combative behavior at home. History comes from the patient's spouse and sister-in -law at bedside. Patient follows a neurologist in [place] and spouse reports recent change in medications include increase of zyprexa from 2.5mg to 10mg BID. He was also started on gabapentin TID. Spouse denies recent fever, chills, cough, D/N/V.  Per the MAR from [place] he received Haldol x4 IM, benadryl and ativan IV x2. The facility reports he was not eating or drinking and no witnessed episodes of diarrhea or vomiting. On our workup he has Na153, elevated  AST, hematuria, WBC 17k with 2% bands, lactic acid 1.2. No source of infection on UA or chest xray. On exam he does show discomfort in RUQ and midepigastrium. Abd CT and lipase pending. Troponin mildly elevated 0.056 with some EKG changes. "     Since admission his work up has been largely unrevealing. He remains altered and combative when he is not sleeping. Neurology planning for LP. At this point his prognosis is guarded and potentially quite poor if his mentation does not improve. I have been asked to assist with goals of care.      Hospital Course:  No notes on file    Interval History: No major changes    Past Medical History:   Diagnosis Date    Cancer     Dementia     Depression     Hypertension  "       Past Surgical History:   Procedure Laterality Date    Lymph node Ca removed         Review of patient's allergies indicates:  No Known Allergies    Medications:  Continuous Infusions:   Amino acid 5% - dextrose 15% (CLINIMIX-E) solution with additives.  CENTRAL LINE ONLY (1395 mOsm/L) 70 mL/hr at 11/04/22 0623    Amino acid 5% - dextrose 15% (CLINIMIX-E) solution with additives.  CENTRAL LINE ONLY (1395 mOsm/L)      [START ON 11/5/2022] Amino acid 5% - dextrose 15% (CLINIMIX-E) solution with additives.  CENTRAL LINE ONLY (1395 mOsm/L)      lactated ringers 20 mL/hr at 11/04/22 0623     Scheduled Meds:   ampicillin-sulbactim (UNASYN) IVPB  3 g Intravenous Q6H    bromocriptine  2.5 mg Oral Daily    dantrolene (DANTRIUM) IVPB  1 mg/kg Intravenous Q8H    enoxaparin  40 mg Subcutaneous Daily    fat emulsion 20%  250 mL Intravenous Daily    [START ON 11/5/2022] fat emulsion 20%  250 mL Intravenous Daily    levothyroxine  44 mcg Intravenous Daily    lorazepam  1 mg Intravenous QHS    scopolamine  1 patch Transdermal Q3 Days    senna-docusate 8.6-50 mg  1 tablet Oral BID    sodium chloride 0.9%  10 mL Intravenous Q6H    valproate sodium (DEPACON) IVPB  250 mg Intravenous Q8H    vancomycin (VANCOCIN) IVPB  1,250 mg Intravenous Q12H     PRN Meds:acetaminophen, aluminum-magnesium hydroxide-simethicone, bisacodyL, dextrose 10%, dextrose 10%, glucagon (human recombinant), glucose, glucose, labetaloL, naloxone, ondansetron, sodium chloride 0.9%, [place] PICC Protocol **AND** sodium chloride 0.9% **AND** sodium chloride 0.9%, Pharmacy to dose Vancomycin consult **AND** vancomycin - pharmacy to dose    Family History    None       Tobacco Use    Smoking status: Not on file    Smokeless tobacco: Not on file   Substance and Sexual Activity    Alcohol use: Not on file    Drug use: Not on file    Sexual activity: Not on file       Review of Systems   Unable to perform ROS: Mental status change   Objective:      Vital Signs (Most Recent):  Temp: 97.2 °F (36.2 °C) (11/04/22 1221)  Pulse: (!) 58 (11/04/22 1221)  Resp: 18 (11/04/22 1221)  BP: 124/89 (11/04/22 1221)  SpO2: 98 % (11/04/22 1223)   Vital Signs (24h Range):  Temp:  [97.2 °F (36.2 °C)-100.2 °F (37.9 °C)] 97.2 °F (36.2 °C)  Pulse:  [58-79] 58  Resp:  [17-18] 18  SpO2:  [96 %-99 %] 98 %  BP: (124-184)/(68-89) 124/89     Weight: 56.7 kg (125 lb)  Body mass index is 19.58 kg/m².    Physical Exam  Constitutional:       General: He is not in acute distress.     Appearance: He is ill-appearing. He is not toxic-appearing.   HENT:      Head: Normocephalic and atraumatic.      Right Ear: External ear normal.      Left Ear: External ear normal.      Mouth/Throat:      Mouth: Mucous membranes are dry.      Pharynx: Oropharynx is clear. No oropharyngeal exudate or posterior oropharyngeal erythema.   Eyes:      General:         Right eye: No discharge.         Left eye: No discharge.      Extraocular Movements: Extraocular movements intact.   Cardiovascular:      Rate and Rhythm: Normal rate and regular rhythm.   Pulmonary:      Effort: Pulmonary effort is normal. No respiratory distress.      Breath sounds: No wheezing.   Abdominal:      General: There is no distension.      Palpations: Abdomen is soft.   Musculoskeletal:         General: No swelling or tenderness.      Cervical back: Neck supple. No tenderness.   Skin:     General: Skin is warm and dry.   Neurological:      Comments: obtunded       Review of Symptoms      Symptom Assessment (ESAS 0-10 Scale)  Unable to complete assessment due to Mental status change         Pain Assessment in Advanced Demential Scale (PAINAD)   Breathing - Independent of vocalization:  0  Negative vocalization:  0  Facial expression:  1  Body language:  1  Consolability:  1  Total:  3    Performance Status:  20    Living Arrangements:  Lives with spouse     Time-Based Charting:  Yes  Chart Review: 10 minutes  Face to Face: 40  minutes  Coordination of Care: 10 minutes    Total Time Spent: 60 minutes      Advance Care Planning   Advance Directives:   Do Not Resuscitate Status: Yes      Decision Making:  Family answered questions  Goals of Care: What is most important right now is to focus on improvement in condition but with limits to invasive therapies, comfort and QOL . Accordingly, we have decided that the best plan to meet the patient's goals includes continuing with treatment.       Significant Labs: BMP:   Recent Labs   Lab 11/03/22 0804   GLU 91      K 4.1      CO2 22*   BUN 13   CREATININE 0.7   CALCIUM 8.8   MG 1.8       CBC:   Recent Labs   Lab 11/03/22  0804   WBC 9.89   HGB 11.2*   HCT 34.3*          CBC:   Recent Labs   Lab 11/03/22  0804   WBC 9.89   HGB 11.2*   HCT 34.3*   MCV 93          BMP:  No results for input(s): GLU, NA, K, CL, CO2, BUN, CREATININE, CALCIUM, MG in the last 24 hours.    LFT:  Lab Results   Component Value Date    AST 30 11/03/2022    ALKPHOS 71 11/03/2022    BILITOT 0.7 11/03/2022     Albumin:   Albumin   Date Value Ref Range Status   11/03/2022 2.3 (L) 3.5 - 5.2 g/dL Final     Protein:   Total Protein   Date Value Ref Range Status   11/03/2022 5.4 (L) 6.0 - 8.4 g/dL Final     Lactic acid:   Lab Results   Component Value Date    LACTATE 1.0 11/02/2022    LACTATE 1.2 10/29/2022       Significant Imaging: I have reviewed all pertinent imaging results/findings within the past 24 hours.        Yung Posada MD  Palliative Medicine  [facility]               Walking/to waiting room

## 2022-11-10 ENCOUNTER — EMERGENCY (EMERGENCY)
Facility: HOSPITAL | Age: 32
LOS: 1 days | Discharge: HOME | End: 2022-11-10
Attending: STUDENT IN AN ORGANIZED HEALTH CARE EDUCATION/TRAINING PROGRAM | Admitting: STUDENT IN AN ORGANIZED HEALTH CARE EDUCATION/TRAINING PROGRAM

## 2022-11-10 VITALS
TEMPERATURE: 102 F | HEIGHT: 72 IN | RESPIRATION RATE: 18 BRPM | DIASTOLIC BLOOD PRESSURE: 91 MMHG | HEART RATE: 92 BPM | OXYGEN SATURATION: 96 % | SYSTOLIC BLOOD PRESSURE: 137 MMHG

## 2022-11-10 VITALS — TEMPERATURE: 99 F

## 2022-11-10 DIAGNOSIS — Z98.890 OTHER SPECIFIED POSTPROCEDURAL STATES: Chronic | ICD-10-CM

## 2022-11-10 LAB
ALBUMIN SERPL ELPH-MCNC: 3.8 G/DL — SIGNIFICANT CHANGE UP (ref 3.3–5)
ALP SERPL-CCNC: 62 U/L — SIGNIFICANT CHANGE UP (ref 40–120)
ALT FLD-CCNC: 12 U/L — SIGNIFICANT CHANGE UP (ref 4–41)
ANION GAP SERPL CALC-SCNC: 16 MMOL/L — HIGH (ref 7–14)
APPEARANCE UR: CLEAR — SIGNIFICANT CHANGE UP
APTT BLD: 29.4 SEC — SIGNIFICANT CHANGE UP (ref 27–36.3)
AST SERPL-CCNC: 35 U/L — SIGNIFICANT CHANGE UP (ref 4–40)
B PERT DNA SPEC QL NAA+PROBE: SIGNIFICANT CHANGE UP
B PERT+PARAPERT DNA PNL SPEC NAA+PROBE: SIGNIFICANT CHANGE UP
BACTERIA # UR AUTO: NEGATIVE — SIGNIFICANT CHANGE UP
BASE EXCESS BLDV CALC-SCNC: -0.2 MMOL/L — SIGNIFICANT CHANGE UP (ref -2–3)
BASOPHILS # BLD AUTO: 0.01 K/UL — SIGNIFICANT CHANGE UP (ref 0–0.2)
BASOPHILS NFR BLD AUTO: 0.1 % — SIGNIFICANT CHANGE UP (ref 0–2)
BILIRUB SERPL-MCNC: 1.2 MG/DL — SIGNIFICANT CHANGE UP (ref 0.2–1.2)
BILIRUB UR-MCNC: ABNORMAL
BLOOD GAS VENOUS COMPREHENSIVE RESULT: SIGNIFICANT CHANGE UP
BORDETELLA PARAPERTUSSIS (RAPRVP): SIGNIFICANT CHANGE UP
BUN SERPL-MCNC: 12 MG/DL — SIGNIFICANT CHANGE UP (ref 7–23)
C PNEUM DNA SPEC QL NAA+PROBE: SIGNIFICANT CHANGE UP
CALCIUM SERPL-MCNC: 8.6 MG/DL — SIGNIFICANT CHANGE UP (ref 8.4–10.5)
CHLORIDE BLDV-SCNC: 97 MMOL/L — SIGNIFICANT CHANGE UP (ref 96–108)
CHLORIDE SERPL-SCNC: 98 MMOL/L — SIGNIFICANT CHANGE UP (ref 98–107)
CO2 BLDV-SCNC: 25 MMOL/L — SIGNIFICANT CHANGE UP (ref 22–26)
CO2 SERPL-SCNC: 22 MMOL/L — SIGNIFICANT CHANGE UP (ref 22–31)
COLOR SPEC: YELLOW — SIGNIFICANT CHANGE UP
CREAT SERPL-MCNC: 0.8 MG/DL — SIGNIFICANT CHANGE UP (ref 0.5–1.3)
DIFF PNL FLD: NEGATIVE — SIGNIFICANT CHANGE UP
EGFR: 120 ML/MIN/1.73M2 — SIGNIFICANT CHANGE UP
EOSINOPHIL # BLD AUTO: 0 K/UL — SIGNIFICANT CHANGE UP (ref 0–0.5)
EOSINOPHIL NFR BLD AUTO: 0 % — SIGNIFICANT CHANGE UP (ref 0–6)
EPI CELLS # UR: 2 /HPF — SIGNIFICANT CHANGE UP (ref 0–5)
FLUAV SUBTYP SPEC NAA+PROBE: SIGNIFICANT CHANGE UP
FLUBV RNA SPEC QL NAA+PROBE: SIGNIFICANT CHANGE UP
GAS PNL BLDV: 132 MMOL/L — LOW (ref 136–145)
GLUCOSE BLDV-MCNC: 85 MG/DL — SIGNIFICANT CHANGE UP (ref 70–99)
GLUCOSE SERPL-MCNC: 88 MG/DL — SIGNIFICANT CHANGE UP (ref 70–99)
GLUCOSE UR QL: NEGATIVE — SIGNIFICANT CHANGE UP
HADV DNA SPEC QL NAA+PROBE: SIGNIFICANT CHANGE UP
HCO3 BLDV-SCNC: 24 MMOL/L — SIGNIFICANT CHANGE UP (ref 22–29)
HCOV 229E RNA SPEC QL NAA+PROBE: SIGNIFICANT CHANGE UP
HCOV HKU1 RNA SPEC QL NAA+PROBE: SIGNIFICANT CHANGE UP
HCOV NL63 RNA SPEC QL NAA+PROBE: SIGNIFICANT CHANGE UP
HCOV OC43 RNA SPEC QL NAA+PROBE: SIGNIFICANT CHANGE UP
HCT VFR BLD CALC: 32.9 % — LOW (ref 39–50)
HCT VFR BLDA CALC: 33 % — LOW (ref 39–51)
HGB BLD CALC-MCNC: 11.1 G/DL — LOW (ref 13–17)
HGB BLD-MCNC: 10.8 G/DL — LOW (ref 13–17)
HMPV RNA SPEC QL NAA+PROBE: SIGNIFICANT CHANGE UP
HPIV1 RNA SPEC QL NAA+PROBE: SIGNIFICANT CHANGE UP
HPIV2 RNA SPEC QL NAA+PROBE: SIGNIFICANT CHANGE UP
HPIV3 RNA SPEC QL NAA+PROBE: SIGNIFICANT CHANGE UP
HPIV4 RNA SPEC QL NAA+PROBE: SIGNIFICANT CHANGE UP
HYALINE CASTS # UR AUTO: 2 /LPF — SIGNIFICANT CHANGE UP (ref 0–7)
IANC: 5.56 K/UL — SIGNIFICANT CHANGE UP (ref 1.8–7.4)
IMM GRANULOCYTES NFR BLD AUTO: 0.5 % — SIGNIFICANT CHANGE UP (ref 0–0.9)
INR BLD: 1.44 RATIO — HIGH (ref 0.88–1.16)
KETONES UR-MCNC: ABNORMAL
LACTATE BLDV-MCNC: 1.1 MMOL/L — SIGNIFICANT CHANGE UP (ref 0.5–2)
LEUKOCYTE ESTERASE UR-ACNC: NEGATIVE — SIGNIFICANT CHANGE UP
LYMPHOCYTES # BLD AUTO: 1.84 K/UL — SIGNIFICANT CHANGE UP (ref 1–3.3)
LYMPHOCYTES # BLD AUTO: 22.4 % — SIGNIFICANT CHANGE UP (ref 13–44)
M PNEUMO DNA SPEC QL NAA+PROBE: SIGNIFICANT CHANGE UP
MCHC RBC-ENTMCNC: 28.5 PG — SIGNIFICANT CHANGE UP (ref 27–34)
MCHC RBC-ENTMCNC: 32.8 GM/DL — SIGNIFICANT CHANGE UP (ref 32–36)
MCV RBC AUTO: 86.8 FL — SIGNIFICANT CHANGE UP (ref 80–100)
MONOCYTES # BLD AUTO: 0.77 K/UL — SIGNIFICANT CHANGE UP (ref 0–0.9)
MONOCYTES NFR BLD AUTO: 9.4 % — SIGNIFICANT CHANGE UP (ref 2–14)
NEUTROPHILS # BLD AUTO: 5.56 K/UL — SIGNIFICANT CHANGE UP (ref 1.8–7.4)
NEUTROPHILS NFR BLD AUTO: 67.6 % — SIGNIFICANT CHANGE UP (ref 43–77)
NITRITE UR-MCNC: NEGATIVE — SIGNIFICANT CHANGE UP
NRBC # BLD: 0 /100 WBCS — SIGNIFICANT CHANGE UP (ref 0–0)
NRBC # FLD: 0 K/UL — SIGNIFICANT CHANGE UP (ref 0–0)
PCO2 BLDV: 36 MMHG — LOW (ref 42–55)
PH BLDV: 7.43 — SIGNIFICANT CHANGE UP (ref 7.32–7.43)
PH UR: 6 — SIGNIFICANT CHANGE UP (ref 5–8)
PLATELET # BLD AUTO: 212 K/UL — SIGNIFICANT CHANGE UP (ref 150–400)
PO2 BLDV: 55 MMHG — SIGNIFICANT CHANGE UP
POTASSIUM BLDV-SCNC: 4.1 MMOL/L — SIGNIFICANT CHANGE UP (ref 3.5–5.1)
POTASSIUM SERPL-MCNC: 3.4 MMOL/L — LOW (ref 3.5–5.3)
POTASSIUM SERPL-SCNC: 3.4 MMOL/L — LOW (ref 3.5–5.3)
PROT SERPL-MCNC: 7.5 G/DL — SIGNIFICANT CHANGE UP (ref 6–8.3)
PROT UR-MCNC: ABNORMAL
PROTHROM AB SERPL-ACNC: 16.8 SEC — HIGH (ref 10.5–13.4)
RAPID RVP RESULT: DETECTED
RBC # BLD: 3.79 M/UL — LOW (ref 4.2–5.8)
RBC # FLD: 12.4 % — SIGNIFICANT CHANGE UP (ref 10.3–14.5)
RBC CASTS # UR COMP ASSIST: 6 /HPF — HIGH (ref 0–4)
RSV RNA SPEC QL NAA+PROBE: SIGNIFICANT CHANGE UP
RV+EV RNA SPEC QL NAA+PROBE: DETECTED
SAO2 % BLDV: 90 % — SIGNIFICANT CHANGE UP
SARS-COV-2 RNA SPEC QL NAA+PROBE: SIGNIFICANT CHANGE UP
SODIUM SERPL-SCNC: 136 MMOL/L — SIGNIFICANT CHANGE UP (ref 135–145)
SP GR SPEC: 1.03 — SIGNIFICANT CHANGE UP (ref 1.01–1.05)
UROBILINOGEN FLD QL: ABNORMAL
WBC # BLD: 8.22 K/UL — SIGNIFICANT CHANGE UP (ref 3.8–10.5)
WBC # FLD AUTO: 8.22 K/UL — SIGNIFICANT CHANGE UP (ref 3.8–10.5)
WBC UR QL: 2 /HPF — SIGNIFICANT CHANGE UP (ref 0–5)

## 2022-11-10 PROCEDURE — 71045 X-RAY EXAM CHEST 1 VIEW: CPT | Mod: 26

## 2022-11-10 PROCEDURE — 99285 EMERGENCY DEPT VISIT HI MDM: CPT

## 2022-11-10 PROCEDURE — 93010 ELECTROCARDIOGRAM REPORT: CPT

## 2022-11-10 RX ORDER — ACETAMINOPHEN 500 MG
975 TABLET ORAL ONCE
Refills: 0 | Status: COMPLETED | OUTPATIENT
Start: 2022-11-10 | End: 2022-11-10

## 2022-11-10 RX ORDER — LIDOCAINE 4 G/100G
1 CREAM TOPICAL ONCE
Refills: 0 | Status: COMPLETED | OUTPATIENT
Start: 2022-11-10 | End: 2022-11-10

## 2022-11-10 RX ORDER — SODIUM CHLORIDE 9 MG/ML
1000 INJECTION INTRAMUSCULAR; INTRAVENOUS; SUBCUTANEOUS ONCE
Refills: 0 | Status: COMPLETED | OUTPATIENT
Start: 2022-11-10 | End: 2022-11-10

## 2022-11-10 RX ADMIN — SODIUM CHLORIDE 1000 MILLILITER(S): 9 INJECTION INTRAMUSCULAR; INTRAVENOUS; SUBCUTANEOUS at 09:28

## 2022-11-10 RX ADMIN — Medication 100 MILLIGRAM(S): at 09:04

## 2022-11-10 RX ADMIN — Medication 975 MILLIGRAM(S): at 09:04

## 2022-11-10 RX ADMIN — SODIUM CHLORIDE 1000 MILLILITER(S): 9 INJECTION INTRAMUSCULAR; INTRAVENOUS; SUBCUTANEOUS at 11:30

## 2022-11-10 RX ADMIN — LIDOCAINE 1 PATCH: 4 CREAM TOPICAL at 12:44

## 2022-11-10 RX ADMIN — Medication 975 MILLIGRAM(S): at 10:09

## 2022-11-10 NOTE — ED ADULT TRIAGE NOTE - NS ED TRIAGE AVPU SCALE
[email or web address]
Alert-The patient is alert, awake and responds to voice. The patient is oriented to time, place, and person. The triage nurse is able to obtain subjective information.

## 2022-11-10 NOTE — ED PROVIDER NOTE - NSFOLLOWUPINSTRUCTIONS_ED_ALL_ED_FT
(1) Follow up with your primary care physician as discussed.    (2) Immediately seek care at your nearest emergency room if your symptoms worsen, persist, or do not resolve     (3) Take Tylenol and/or Motrin as needed for pain per the dosing instructions on the bottle.     (4) Take the prescribed medication as instructed:  -  -    Viral Syndrome    WHAT YOU NEED TO KNOW:    Viral syndrome is a term used for symptoms of an infection caused by a virus. Viruses are spread easily from person to person on shared items.    DISCHARGE INSTRUCTIONS:    Call your local emergency number (911 in the [place]), or have someone call if:   •You have a seizure.  •You cannot be woken.  •You have chest pain or trouble breathing.    Return to the emergency department if:   •You have a stiff neck, a bad headache, and sensitivity to light.  •You feel weak, dizzy, or confused.  •You stop urinating or urinate a lot less than usual.  •You cough up blood or thick yellow or green mucus.  •You have severe abdominal pain or your abdomen is larger than usual.    Call your doctor if:   •Your symptoms do not get better with treatment or get worse after 3 days.  •You have a rash or ear pain.  •You have burning when you urinate.  •You have questions or concerns about your condition or care.    Medicines: You may need any of the following:   •Acetaminophen decreases pain and fever. It is available without a doctor's order. Ask how much to take and how often to take it. Follow directions. Read the labels of all other medicines you are using to see if they also contain acetaminophen, or ask your doctor or pharmacist. Acetaminophen can cause liver damage if not taken correctly.    •NSAIDs, such as ibuprofen, help decrease swelling, pain, and fever. NSAIDs can cause stomach bleeding or kidney problems in certain people. If you take blood thinner medicine, always ask your healthcare provider if NSAIDs are safe for you. Always read the medicine label and follow directions.    •Cold medicine helps decrease swelling, control a cough, and relieve chest or nasal congestion.     •Saline nasal spray helps decrease nasal congestion.     •Take your medicine as directed. Contact your healthcare provider if you think your medicine is not helping or if you have side effects. Tell your provider if you are allergic to any medicine. Keep a list of the medicines, vitamins, and herbs you take. Include the amounts, and when and why you take them. Bring the list or the pill bottles to follow-up visits. Carry your medicine list with you in case of an emergency.      Manage your symptoms:   •Drink liquids as directed to prevent dehydration. Ask how much liquid to drink each day and which liquids are best for you. Do not drink liquids with caffeine. Caffeine can make dehydration worse.     •Get plenty of rest to help your body heal. Take naps throughout the day. Ask your healthcare provider when you can return to work and your normal activities.  •Use a cool mist humidifier to increase air moisture in your home. This may make it easier for you to breathe and help decrease your cough.     •Drink tea with honey or use cough drops for a sore throat. Cough drops are available without a doctor's order. Follow directions for taking cough drops.    •Do not smoke or be close to anyone who is smoking. Nicotine and other chemicals in cigarettes and cigars can cause lung damage. Smoking can also delay healing. Ask your healthcare provider for information if you currently smoke and need help to quit. E-cigarettes or smokeless tobacco still contain nicotine. Talk to your healthcare provider before you use these products.    Prevent the spread of germs:   •Wash your hands often throughout the day. Use soap and water. Rub your soapy hands together, lacing your fingers, for at least 20 seconds. Rinse with warm, running water. Dry your hands with a clean towel or paper towel. Use hand  that contains alcohol if soap and water are not available. Teach children how to wash their hands and use hand .     •Cover sneezes and coughs. Turn your face away and cover your mouth and nose with a tissue. Throw the tissue away. Use the bend of your arm if a tissue is not available. Then wash your hands well with soap and water or use hand . Teach children how to cover a cough or sneeze.    •Stay home while you are sick. Avoid crowds as much as possible.    •Get the influenza (flu) vaccine as soon as recommended each year. The flu vaccine is available starting in September or October. Ask your healthcare provider about the pneumonia vaccine. This vaccine is usually recommended every 5 years in older adults.       Follow up with your doctor as directed: Write down your questions so you remember to ask them during your visits.

## 2022-11-10 NOTE — ED PROVIDER NOTE - OBJECTIVE STATEMENT
OAston DO PGY-3:   31 y/o M w/ pmhx of HIV on biktarvy p/w 1 week of sx of cough (w/ green sputum) and chills. Today pt is febrile upon ED arrival. Pt does not know his last CD4 count but per chart review CD4 count on 9/11/22 was 381. Pt denies cp, sob, abd pain, urinary sx.

## 2022-11-10 NOTE — ED PROVIDER NOTE - CLINICAL SUMMARY MEDICAL DECISION MAKING FREE TEXT BOX
O[place]amonte DO PGY-3: pt w/ hx of HIV and compliant w/ biktarvy along w/ hx of GBS per chart review p/w 1 week of cough, chills. today pt had generalzied weakness. Ordered labs, cxr. Will reassess. Dispo pending workup.

## 2022-11-10 NOTE — ED PROVIDER NOTE - ATTENDING CONTRIBUTION TO CARE
I have personally performed a face to face medical and diagnostic evaluation of the patient. I have discussed with and reviewed the Resident's note and agree with the History, ROS, Physical Exam and MDM unless otherwise indicated. A brief summary of my personal evaluation and impression can be found below.    32y M w/ PMHx HIV on Biktarvy presenting with 2 weeks of generalized weakness, fever and productive cough. Patient states his cough has been worsening at home, has not seen a doctor since onset of his symptoms, follows with Dr. Hunter for his HIV but does state he has poor follow-up and does not know his latest CD4 count. Patient also describes sore throat and fatigue. Denies headache, dizziness, chest pain, nausea, vomiting, diarrhea or rash. Denies known sick contacts.     On exam: Patient uncomfortable appearing with frequent coughing bouts, no tachypnea, lungs CTAB, posterior pharynx w/o evidence of exudate or thrust, uvula midline, +cervical lymphadenopathy, trachea midline, abd soft, NT, no rash, no LE edema, febrile on arrival.     Will obtain sepsis work-up w/f pneumonia given 2 weeks of worsening productive cough, will provide with tylenol, IVF, obtain CXR, EKG, BCx, UA/UCx, given immunocompromised state and poor follow-up would likely recommend admission.

## 2022-11-10 NOTE — ED PROVIDER NOTE - NS ED ROS FT
CONSTITUTIONAL +fever, No diaphoresis, No weight change  SKIN - No rash  HEMATOLOGIC - No abnormal bleeding or bruising  EYES - No eye pain, No blurred vision  ENT - No change in hearing, +sore throat, No neck pain, No rhinorrhea, No ear pain  RESPIRATORY - No shortness of breath, +cough  CARDIAC -No chest pain, No palpitations  GI - No abdominal pain, No nausea, No vomiting, No diarrhea, No constipation  - No dysuria, no frequency, no hematuria.   MUSCULOSKELETAL - No joint pain, No swelling, No back pain  NEUROLOGIC - No numbness, +weakness, No headache, No dizziness

## 2022-11-10 NOTE — ED PROVIDER NOTE - PATIENT PORTAL LINK FT
You can access the FollowMyHealth Patient Portal offered by [facility] by registering at the following website: [email or web address]. By joining [employer]’s FollowMyHealth portal, you will also be able to view your health information using other applications (apps) compatible with our system.

## 2022-11-10 NOTE — ED ADULT NURSE NOTE - CHIEF COMPLAINT QUOTE
Pt arrives to ED via EMS c/o generalized total body weakness and reports not feeling well with a productive cough (green sputum) for about a week.  Pt febrile in triage 102.1 F.  Pt is [place] negative, no facial droop, slurring of speech or pronator drift.  Weakness is equal bilaterally.  Hx of Guillain [place] Syndrome.    Pt is HIV+  fs = 109

## 2022-11-10 NOTE — ED ADULT TRIAGE NOTE - NS ED NURSE BANDS TYPE
MD to put in central line due to no peripheral IV access     Adalid Romero RN  01/20/20 5153 Name band;

## 2022-11-10 NOTE — PROVIDER CONTACT NOTE (OTHER) - ASSESSMENT
was informed by provider that patient was discharged but reported he's homeless.  spoke to patient and reported he doesn't have any family/family who can stay with.  provided patient resources for 24hr drop in shelters and [facility].  also provided [employer] for transportation;  was informed by provider that patient was discharged but reported he's homeless.  spoke to patient at bedside and he reported he doesn't have any family/friend who can provide him with shelter.  provided patient resources for 24hr drop in shelters and [facility].  also provided [employer] for transportation; serial numbers [id number]. No other social work needs at this time.  remains available.

## 2022-11-10 NOTE — ED ADULT NURSE REASSESSMENT NOTE - NS ED NURSE REASSESS COMMENT FT1
pt lying in bed asleep, easily arousable. A&Ox4, respirations even and unlabored, O2 100% on RA. pt states "I am feeling better." bed in lowest position, siderails up, call bell in reach. Xray at bedside. awaiting results.

## 2022-11-10 NOTE — ED PROVIDER NOTE - PHYSICAL EXAMINATION
CONSTITUTIONAL: Well-developed; well-nourished; in no acute distress.   SKIN: warm, dry  HEAD: Normocephalic; atraumatic.  EYES: no conjunctival injection. PERRL.   ENT: No nasal discharge; airway clear.  NECK: Supple; non tender.  CARD: S1, S2 normal; no murmurs, gallops, or rubs. Regular rate and rhythm.   RESP: +coarse breath sounds at bases   ABD: soft ntnd, no guarding, no distention, no rigidity.   EXT: Ambulates independently.  No cyanosis or edema.   NEURO: Alert, oriented, grossly unremarkable  PSYCH: Cooperative, appropriate.

## 2022-11-10 NOTE — ED PROVIDER NOTE - WET READ LAUNCH FT
There are no Wet Read(s) to document. [facility]poni Counseling:  I discussed with the patient the risks of golimumab including but not limited to myelosuppression, immunosuppression, autoimmune hepatitis, demyelinating diseases, lymphoma, and serious infections.  The patient understands that monitoring is required including a PPD at baseline and must alert us or the primary physician if symptoms of infection or other concerning signs are noted.

## 2022-11-10 NOTE — ED ADULT TRIAGE NOTE - CHIEF COMPLAINT QUOTE
Pt arrives to ED via EMS c/o generalized total body weakness and reports not feeling well with a productive cough (green sputum) for about a week.  Pt febrile in triage 102.1 F.  Pt is [place] negative, no facial droop, slurring of speech or pronator drift.  Weakness is equal bilaterally.  Hx of Guillain [place] Syndrome.    Pt is HIV+  fs = 109

## 2022-11-10 NOTE — ED PROVIDER NOTE - PROGRESS NOTE DETAILS
Zoe HDZ PGY-3: Results explained to patient. Explained strict return precautions. Sent tessalon pearls to pharmacy as pt has rhino/entero virus. Instructed pt to f/u w/ pcp. Kehinde bolton

## 2022-11-10 NOTE — ED ADULT NURSE NOTE - OBJECTIVE STATEMENT
pt received in rm 3. A&Ox4, ambulatory at baseline. respirations even and unlabored, completing full sentences. pt c/o generalized weakness, fevers and a productive cough with a sore throat x2wks. pt febrile 100.6. pt sputum green in color. pt states he did not treat any sx at home. pt states previously HIV+. pt denies cp, dizziness, lightheadedness, n/v/d, gu sx. pt medicated as per MD orders. bed in lowest position, siderails up, call bell in reach.  pt awaiting XRay.

## 2022-11-11 LAB
CULTURE RESULTS: SIGNIFICANT CHANGE UP
SPECIMEN SOURCE: SIGNIFICANT CHANGE UP

## 2022-11-18 ENCOUNTER — INPATIENT (INPATIENT)
Facility: HOSPITAL | Age: 32
LOS: 2 days | Discharge: HOME | DRG: 861 | End: 2022-11-21
Attending: HOSPITALIST | Admitting: INTERNAL MEDICINE
Payer: MEDICAID

## 2022-11-18 VITALS
HEIGHT: 72 IN | TEMPERATURE: 98 F | WEIGHT: 220.02 LBS | SYSTOLIC BLOOD PRESSURE: 118 MMHG | HEART RATE: 77 BPM | OXYGEN SATURATION: 100 % | DIASTOLIC BLOOD PRESSURE: 89 MMHG | RESPIRATION RATE: 18 BRPM

## 2022-11-18 DIAGNOSIS — Z98.890 OTHER SPECIFIED POSTPROCEDURAL STATES: Chronic | ICD-10-CM

## 2022-11-18 LAB
ALBUMIN SERPL ELPH-MCNC: 3.2 G/DL — LOW (ref 3.3–5)
ALP SERPL-CCNC: 58 U/L — SIGNIFICANT CHANGE UP (ref 40–120)
ALT FLD-CCNC: 47 U/L — SIGNIFICANT CHANGE UP (ref 12–78)
ANION GAP SERPL CALC-SCNC: 7 MMOL/L — SIGNIFICANT CHANGE UP (ref 5–17)
AST SERPL-CCNC: 36 U/L — SIGNIFICANT CHANGE UP (ref 15–37)
BASOPHILS # BLD AUTO: 0 K/UL — SIGNIFICANT CHANGE UP (ref 0–0.2)
BASOPHILS NFR BLD AUTO: 0 % — SIGNIFICANT CHANGE UP (ref 0–2)
BILIRUB SERPL-MCNC: 0.3 MG/DL — SIGNIFICANT CHANGE UP (ref 0.2–1.2)
BUN SERPL-MCNC: 12 MG/DL — SIGNIFICANT CHANGE UP (ref 7–23)
CALCIUM SERPL-MCNC: 8.4 MG/DL — LOW (ref 8.5–10.1)
CHLORIDE SERPL-SCNC: 107 MMOL/L — SIGNIFICANT CHANGE UP (ref 96–108)
CO2 SERPL-SCNC: 25 MMOL/L — SIGNIFICANT CHANGE UP (ref 22–31)
CREAT SERPL-MCNC: 0.76 MG/DL — SIGNIFICANT CHANGE UP (ref 0.5–1.3)
EGFR: 122 ML/MIN/1.73M2 — SIGNIFICANT CHANGE UP
EOSINOPHIL # BLD AUTO: 0 K/UL — SIGNIFICANT CHANGE UP (ref 0–0.5)
EOSINOPHIL NFR BLD AUTO: 0 % — SIGNIFICANT CHANGE UP (ref 0–6)
FLUAV AG NPH QL: SIGNIFICANT CHANGE UP
FLUBV AG NPH QL: SIGNIFICANT CHANGE UP
GLUCOSE SERPL-MCNC: 108 MG/DL — HIGH (ref 70–99)
HCT VFR BLD CALC: 34.3 % — LOW (ref 39–50)
HGB BLD-MCNC: 11.2 G/DL — LOW (ref 13–17)
LIDOCAIN IGE QN: 67 U/L — LOW (ref 73–393)
LYMPHOCYTES # BLD AUTO: 3.47 K/UL — HIGH (ref 1–3.3)
LYMPHOCYTES # BLD AUTO: 60 % — HIGH (ref 13–44)
MAGNESIUM SERPL-MCNC: 1.4 MG/DL — LOW (ref 1.6–2.6)
MCHC RBC-ENTMCNC: 29.3 PG — SIGNIFICANT CHANGE UP (ref 27–34)
MCHC RBC-ENTMCNC: 32.7 GM/DL — SIGNIFICANT CHANGE UP (ref 32–36)
MCV RBC AUTO: 89.8 FL — SIGNIFICANT CHANGE UP (ref 80–100)
MONOCYTES # BLD AUTO: 0.52 K/UL — SIGNIFICANT CHANGE UP (ref 0–0.9)
MONOCYTES NFR BLD AUTO: 9 % — SIGNIFICANT CHANGE UP (ref 2–14)
NEUTROPHILS # BLD AUTO: 1.79 K/UL — LOW (ref 1.8–7.4)
NEUTROPHILS NFR BLD AUTO: 31 % — LOW (ref 43–77)
NRBC # BLD: SIGNIFICANT CHANGE UP /100 WBCS (ref 0–0)
NT-PROBNP SERPL-SCNC: 29 PG/ML — SIGNIFICANT CHANGE UP (ref 0–125)
PLATELET # BLD AUTO: 350 K/UL — SIGNIFICANT CHANGE UP (ref 150–400)
POTASSIUM SERPL-MCNC: 3.5 MMOL/L — SIGNIFICANT CHANGE UP (ref 3.5–5.3)
POTASSIUM SERPL-SCNC: 3.5 MMOL/L — SIGNIFICANT CHANGE UP (ref 3.5–5.3)
PROT SERPL-MCNC: 7.7 GM/DL — SIGNIFICANT CHANGE UP (ref 6–8.3)
RBC # BLD: 3.82 M/UL — LOW (ref 4.2–5.8)
RBC # FLD: 12.8 % — SIGNIFICANT CHANGE UP (ref 10.3–14.5)
RSV RNA NPH QL NAA+NON-PROBE: SIGNIFICANT CHANGE UP
SARS-COV-2 RNA SPEC QL NAA+PROBE: SIGNIFICANT CHANGE UP
SODIUM SERPL-SCNC: 139 MMOL/L — SIGNIFICANT CHANGE UP (ref 135–145)
TROPONIN I, HIGH SENSITIVITY RESULT: 4.92 NG/L — SIGNIFICANT CHANGE UP
WBC # BLD: 5.79 K/UL — SIGNIFICANT CHANGE UP (ref 3.8–10.5)
WBC # FLD AUTO: 5.79 K/UL — SIGNIFICANT CHANGE UP (ref 3.8–10.5)

## 2022-11-18 PROCEDURE — 70450 CT HEAD/BRAIN W/O DYE: CPT | Mod: 26,MD

## 2022-11-18 PROCEDURE — 71045 X-RAY EXAM CHEST 1 VIEW: CPT | Mod: 26

## 2022-11-18 PROCEDURE — 74177 CT ABD & PELVIS W/CONTRAST: CPT | Mod: 26,MD

## 2022-11-18 PROCEDURE — 99285 EMERGENCY DEPT VISIT HI MDM: CPT

## 2022-11-18 PROCEDURE — 93010 ELECTROCARDIOGRAM REPORT: CPT

## 2022-11-18 PROCEDURE — 71260 CT THORAX DX C+: CPT | Mod: 26,MD

## 2022-11-18 PROCEDURE — 72125 CT NECK SPINE W/O DYE: CPT | Mod: 26,MD

## 2022-11-18 RX ORDER — MORPHINE SULFATE 50 MG/1
2 CAPSULE, EXTENDED RELEASE ORAL ONCE
Refills: 0 | Status: DISCONTINUED | OUTPATIENT
Start: 2022-11-18 | End: 2022-11-18

## 2022-11-18 RX ORDER — MAGNESIUM SULFATE 500 MG/ML
2 VIAL (ML) INJECTION ONCE
Refills: 0 | Status: COMPLETED | OUTPATIENT
Start: 2022-11-18 | End: 2022-11-18

## 2022-11-18 RX ADMIN — MORPHINE SULFATE 2 MILLIGRAM(S): 50 CAPSULE, EXTENDED RELEASE ORAL at 23:09

## 2022-11-18 RX ADMIN — Medication 25 GRAM(S): at 22:05

## 2022-11-18 NOTE — ED PROVIDER NOTE - MUSCULOSKELETAL, MLM
Spine appears normal, range of motion is not limited, no muscle or joint tenderness Spine appears normal, range of motion is not limited, no muscle or joint tenderness. No nuchal rigidity. No saddle anesthesia.
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
## 2022-11-18 NOTE — ED PROVIDER NOTE - CONSTITUTIONAL, MLM
Well appearing, awake, alert, oriented to person, place, time/situation and in no apparent distress. normal... Well appearing, awake, alert, oriented to person, place, time/situation and in no apparent distress. Nontoxic appearing. AAOx4

## 2022-11-18 NOTE — ED PROVIDER NOTE - NS_ ATTENDINGSCRIBEDETAILS _ED_A_ED_FT
I Micah Quan MD saw and examined the patient. Scribe documented for me and under my supervision. I have modified the scribe's documentation where necessary to reflect my history, physical exam and other relevant documentations pertinent to the care of the patient.

## 2022-11-18 NOTE — ED PROVIDER NOTE - CLINICAL SUMMARY MEDICAL DECISION MAKING FREE TEXT BOX
Plan: labs, imaging, neuro consult, admission. Plan: labs, imaging, neuro consult, admission.    Kadeem STEVENS: Spoke with Dr. Contreras Frye. Patient with improved strength in legs, now 5/5 in lower legs on flexion and extension. CTs unremarkable for any acute traumatic findings. As per Dr. Frye no acute intervention, inpatient admission evaluation follow up with neurology as needed. Neuro's help and call back is appreciated. Spoke with Dr. Wing, patient's admission is appreciated. Patient unable to ambulate, moderate diffuse pain, and requires further inpatient care. Given hx of Guillain [place] syndrome, requires further inpatient care and rule out.

## 2022-11-18 NOTE — ED PROVIDER NOTE - PROGRESS NOTE DETAILS
Kadeem STEVENS: Spoke with Dr. Frye. Patient with improved strength in legs, now 5/5 in lower legs on flexion and extension. CTs unremarkable for any acute traumatic findings. As per Dr. Frye no acute intervention, inpatient admission evaluation follow up with neurology. Neuro's help and call back is appreciated. Spoke with Dr. Wing, patient's admission is appreciated. Kadeem STEVENS: Spoke with Dr. Contreras Frye. Patient with improved strength in legs, now 5/5 in lower legs on flexion and extension. CTs unremarkable for any acute traumatic findings. As per Dr. Frye no acute intervention, inpatient admission evaluation follow up with neurology as needed. Neuro's help and call back is appreciated. Spoke with Dr. Wing, patient's admission is appreciated. Patient unable to ambulate, moderate diffuse pain, and requires further inpatient care.
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
## 2022-11-18 NOTE — ED PROVIDER NOTE - NEUROLOGICAL, MLM
Alert and oriented, no focal deficits, no motor or sensory deficits. 3/5 strength in LE, NIH 2, GCS 15 3/5 strength in LE, NIH 2, GCS 15. Intact proprioception. Intact reflexes.

## 2022-11-19 DIAGNOSIS — R53.1 WEAKNESS: ICD-10-CM

## 2022-11-19 LAB
ADD ON TEST-SPECIMEN IN LAB: SIGNIFICANT CHANGE UP
ANION GAP SERPL CALC-SCNC: 7 MMOL/L — SIGNIFICANT CHANGE UP (ref 5–17)
BUN SERPL-MCNC: 11 MG/DL — SIGNIFICANT CHANGE UP (ref 7–23)
CALCIUM SERPL-MCNC: 8 MG/DL — LOW (ref 8.5–10.1)
CHLORIDE SERPL-SCNC: 108 MMOL/L — SIGNIFICANT CHANGE UP (ref 96–108)
CO2 SERPL-SCNC: 26 MMOL/L — SIGNIFICANT CHANGE UP (ref 22–31)
CREAT SERPL-MCNC: 0.84 MG/DL — SIGNIFICANT CHANGE UP (ref 0.5–1.3)
EGFR: 118 ML/MIN/1.73M2 — SIGNIFICANT CHANGE UP
GLUCOSE SERPL-MCNC: 107 MG/DL — HIGH (ref 70–99)
LACTATE SERPL-SCNC: 3.6 MMOL/L — HIGH (ref 0.7–2)
MAGNESIUM SERPL-MCNC: 1.6 MG/DL — SIGNIFICANT CHANGE UP (ref 1.6–2.6)
PHOSPHATE SERPL-MCNC: 1.7 MG/DL — LOW (ref 2.5–4.5)
POTASSIUM SERPL-MCNC: 3.7 MMOL/L — SIGNIFICANT CHANGE UP (ref 3.5–5.3)
POTASSIUM SERPL-SCNC: 3.7 MMOL/L — SIGNIFICANT CHANGE UP (ref 3.5–5.3)
SODIUM SERPL-SCNC: 141 MMOL/L — SIGNIFICANT CHANGE UP (ref 135–145)

## 2022-11-19 PROCEDURE — 36415 COLL VENOUS BLD VENIPUNCTURE: CPT

## 2022-11-19 PROCEDURE — 80048 BASIC METABOLIC PNL TOTAL CA: CPT

## 2022-11-19 PROCEDURE — 87040 BLOOD CULTURE FOR BACTERIA: CPT

## 2022-11-19 PROCEDURE — 99221 1ST HOSP IP/OBS SF/LOW 40: CPT

## 2022-11-19 PROCEDURE — 99223 1ST HOSP IP/OBS HIGH 75: CPT

## 2022-11-19 PROCEDURE — 83605 ASSAY OF LACTIC ACID: CPT

## 2022-11-19 PROCEDURE — 94640 AIRWAY INHALATION TREATMENT: CPT

## 2022-11-19 PROCEDURE — 84100 ASSAY OF PHOSPHORUS: CPT

## 2022-11-19 PROCEDURE — 97162 PT EVAL MOD COMPLEX 30 MIN: CPT | Mod: GP

## 2022-11-19 PROCEDURE — 97116 GAIT TRAINING THERAPY: CPT | Mod: GP

## 2022-11-19 PROCEDURE — 83735 ASSAY OF MAGNESIUM: CPT

## 2022-11-19 PROCEDURE — 82550 ASSAY OF CK (CPK): CPT

## 2022-11-19 RX ORDER — SODIUM CHLORIDE 9 MG/ML
1000 INJECTION INTRAMUSCULAR; INTRAVENOUS; SUBCUTANEOUS ONCE
Refills: 0 | Status: COMPLETED | OUTPATIENT
Start: 2022-11-19 | End: 2022-11-19

## 2022-11-19 RX ORDER — QUETIAPINE FUMARATE 200 MG/1
300 TABLET, FILM COATED ORAL AT BEDTIME
Refills: 0 | Status: DISCONTINUED | OUTPATIENT
Start: 2022-11-19 | End: 2022-11-21

## 2022-11-19 RX ORDER — LANOLIN ALCOHOL/MO/W.PET/CERES
3 CREAM (GRAM) TOPICAL AT BEDTIME
Refills: 0 | Status: DISCONTINUED | OUTPATIENT
Start: 2022-11-19 | End: 2022-11-19

## 2022-11-19 RX ORDER — ONDANSETRON 8 MG/1
4 TABLET, FILM COATED ORAL EVERY 8 HOURS
Refills: 0 | Status: DISCONTINUED | OUTPATIENT
Start: 2022-11-19 | End: 2022-11-21

## 2022-11-19 RX ORDER — BACLOFEN 100 %
10 POWDER (GRAM) MISCELLANEOUS EVERY 8 HOURS
Refills: 0 | Status: DISCONTINUED | OUTPATIENT
Start: 2022-11-19 | End: 2022-11-21

## 2022-11-19 RX ORDER — MAGNESIUM OXIDE 400 MG ORAL TABLET 241.3 MG
400 TABLET ORAL
Refills: 0 | Status: DISCONTINUED | OUTPATIENT
Start: 2022-11-19 | End: 2022-11-21

## 2022-11-19 RX ORDER — LANOLIN ALCOHOL/MO/W.PET/CERES
5 CREAM (GRAM) TOPICAL AT BEDTIME
Refills: 0 | Status: DISCONTINUED | OUTPATIENT
Start: 2022-11-19 | End: 2022-11-21

## 2022-11-19 RX ORDER — ACETAMINOPHEN 500 MG
650 TABLET ORAL EVERY 6 HOURS
Refills: 0 | Status: DISCONTINUED | OUTPATIENT
Start: 2022-11-19 | End: 2022-11-21

## 2022-11-19 RX ORDER — BICTEGRAVIR SODIUM, EMTRICITABINE, AND TENOFOVIR ALAFENAMIDE FUMARATE 30; 120; 15 MG/1; MG/1; MG/1
1 TABLET ORAL DAILY
Refills: 0 | Status: DISCONTINUED | OUTPATIENT
Start: 2022-11-19 | End: 2022-11-21

## 2022-11-19 RX ORDER — MAGNESIUM OXIDE 400 MG ORAL TABLET 241.3 MG
400 TABLET ORAL DAILY
Refills: 0 | Status: DISCONTINUED | OUTPATIENT
Start: 2022-11-19 | End: 2022-11-19

## 2022-11-19 RX ORDER — BACLOFEN 100 %
20 POWDER (GRAM) MISCELLANEOUS EVERY 8 HOURS
Refills: 0 | Status: DISCONTINUED | OUTPATIENT
Start: 2022-11-19 | End: 2022-11-19

## 2022-11-19 RX ORDER — POTASSIUM PHOSPHATE, MONOBASIC POTASSIUM PHOSPHATE, DIBASIC 236; 224 MG/ML; MG/ML
15 INJECTION, SOLUTION INTRAVENOUS ONCE
Refills: 0 | Status: COMPLETED | OUTPATIENT
Start: 2022-11-19 | End: 2022-11-19

## 2022-11-19 RX ORDER — SODIUM CHLORIDE 9 MG/ML
1000 INJECTION INTRAMUSCULAR; INTRAVENOUS; SUBCUTANEOUS
Refills: 0 | Status: DISCONTINUED | OUTPATIENT
Start: 2022-11-19 | End: 2022-11-19

## 2022-11-19 RX ORDER — QUETIAPINE FUMARATE 200 MG/1
100 TABLET, FILM COATED ORAL
Refills: 0 | Status: DISCONTINUED | OUTPATIENT
Start: 2022-11-19 | End: 2022-11-21

## 2022-11-19 RX ADMIN — QUETIAPINE FUMARATE 100 MILLIGRAM(S): 200 TABLET, FILM COATED ORAL at 10:34

## 2022-11-19 RX ADMIN — SODIUM CHLORIDE 1000 MILLILITER(S): 9 INJECTION INTRAMUSCULAR; INTRAVENOUS; SUBCUTANEOUS at 02:50

## 2022-11-19 RX ADMIN — Medication 5 MILLIGRAM(S): at 21:14

## 2022-11-19 RX ADMIN — BICTEGRAVIR SODIUM, EMTRICITABINE, AND TENOFOVIR ALAFENAMIDE FUMARATE 1 TABLET(S): 30; 120; 15 TABLET ORAL at 10:33

## 2022-11-19 RX ADMIN — Medication 50 MILLIGRAM(S): at 14:24

## 2022-11-19 RX ADMIN — Medication 10 MILLIGRAM(S): at 21:15

## 2022-11-19 RX ADMIN — Medication 10 MILLIGRAM(S): at 14:30

## 2022-11-19 RX ADMIN — Medication 10 MILLIGRAM(S): at 10:34

## 2022-11-19 RX ADMIN — MAGNESIUM OXIDE 400 MG ORAL TABLET 400 MILLIGRAM(S): 241.3 TABLET ORAL at 14:30

## 2022-11-19 RX ADMIN — POTASSIUM PHOSPHATE, MONOBASIC POTASSIUM PHOSPHATE, DIBASIC 62.5 MILLIMOLE(S): 236; 224 INJECTION, SOLUTION INTRAVENOUS at 14:24

## 2022-11-19 RX ADMIN — Medication 50 MILLIGRAM(S): at 21:14

## 2022-11-19 RX ADMIN — Medication 50 MILLIGRAM(S): at 10:33

## 2022-11-19 RX ADMIN — MAGNESIUM OXIDE 400 MG ORAL TABLET 400 MILLIGRAM(S): 241.3 TABLET ORAL at 10:34

## 2022-11-19 RX ADMIN — QUETIAPINE FUMARATE 300 MILLIGRAM(S): 200 TABLET, FILM COATED ORAL at 21:15

## 2022-11-19 RX ADMIN — SODIUM CHLORIDE 75 MILLILITER(S): 9 INJECTION INTRAMUSCULAR; INTRAVENOUS; SUBCUTANEOUS at 09:00

## 2022-11-19 NOTE — PATIENT PROFILE ADULT - FALL HARM RISK - HARM RISK INTERVENTIONS

## 2022-11-19 NOTE — H&P ADULT - HISTORY OF PRESENT ILLNESS
32M with PMH of Guillane [place], HIV, PTSD, Night Terrors, Asthma presents with weakness and body aches. On going but slightly worse. Improved in the ER. Admission requested for further evaluation but neuro as per ER physician. CT brain, cervical, chest, ab/pel grossly unremarkable. In the ER 11/10 for upper respiratory symptoms with RVP + For Enterorhinovirus. Discharged from ER as respiratory status was stable. Returns with non-specific symptoms for general body aches, back pain, weakness. Cough has improved. Denies fever, chills, chest pain, increased SOB, nausea, vomiting, abdominal pain/discomfort.     In the ER Tmax 98.3F, HR 76-77, /75, RR 18, SpO2 100% on RA. Lactate 3.6->improved to 1.2 with IVF. Troponin negative, CBC and CMP unremarkable. Magnesium 1.4. CT brain negative for acute process; CT Cervical negative for acute process.  CT chest/ab/pelvis with from reactive bronchitis but no other reported acute abnormality.  32M with PMH of Guillane [place], HIV, PTSD, Night Terrors, Asthma presents with weakness and body aches. On going but slightly worse. Improved in the ER. Admission requested for further evaluation but neuro as per ER physician. CT brain, cervical, chest, ab/pel grossly unremarkable. In the ER 11/10 for upper respiratory symptoms with RVP + For Enterorhinovirus. Discharged from ER as respiratory status was stable. Returns with non-specific symptoms for general body aches, back pain, weakness. Cough has improved. Denies fever, chills, chest pain, increased SOB, nausea, vomiting, abdominal pain/discomfort.     In the ER Tmax 98.3F, HR 76-77, /75, RR 18, SpO2 100% on RA. Lactate 3.6->improved to 1.2 with IVF. Troponin negative, CBC and CMP unremarkable. Magnesium 1.4. CT brain negative for acute process; CT Cervical negative for acute process.  CT chest/ab/pelvis with from reactive bronchitis but no other reported acute abnormality.     Also of note, MR of Thoracic and Lumbar spine with contrast 9/2/2022 negative with No cord or cauda equina compression. No abnormal cord signal or enhancement.       32M with PMH of Guillane [place], HIV, PTSD, Night Terrors, Asthma presents with weakness, low back pain and body aches. On going/chronic but slightly worse. Improved in the ER. Admission requested for further evaluation but neuro as per ER physician. Abruptly stopped his baclofen and lyrica 3-4 days prior to admissmion. CT brain, cervical, chest, ab/pel grossly unremarkable. In the ER 11/10 for upper respiratory symptoms with RVP + For Enterorhinovirus. Discharged from ER as respiratory status was stable. Those symptoms have improved.  Returns with non-specific symptoms for general body aches, back pain, weakness. Cough has improved. Denies fever, chills, chest pain, increased SOB, nausea, vomiting, abdominal pain/discomfort.     In the ER Tmax 98.3F, HR 76-77, /75, RR 18, SpO2 100% on RA. Lactate 3.6->improved to 1.2 with IVF. Troponin negative, CBC and CMP unremarkable. Magnesium 1.4. CT brain negative for acute process; CT Cervical negative for acute process.  CT chest/ab/pelvis with from reactive bronchitis but no other reported acute abnormality.     Also of note, MR of Thoracic and Lumbar spine with contrast 9/2/2022 negative with No cord or cauda equina compression. No abnormal cord signal or enhancement.
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
## 2022-11-19 NOTE — H&P ADULT - ASSESSMENT
MEDICATIONS  (STANDING):  bictegravir 50 mG/emtricitabine 200 mG/tenofovir alafenamide 25 mG (BIKTARVY) 1 Tablet(s) Oral daily  magnesium oxide 400 milliGRAM(s) Oral daily  melatonin 5 milliGRAM(s) Oral at bedtime  pregabalin 50 milliGRAM(s) Oral three times a day  QUEtiapine 300 milliGRAM(s) Oral at bedtime  sodium chloride 0.9%. 1000 milliLiter(s) (75 mL/Hr) IV Continuous <Continuous>    MEDICATIONS  (PRN):  acetaminophen     Tablet .. 650 milliGRAM(s) Oral every 6 hours PRN Temp greater or equal to 38C (100.4F), Mild Pain (1 - 3)  aluminum hydroxide/magnesium hydroxide/simethicone Suspension 30 milliLiter(s) Oral every 4 hours PRN Dyspepsia  baclofen 20 milliGRAM(s) Oral every 8 hours PRN Musculoskeletal Pain  benzonatate 100 milliGRAM(s) Oral every 8 hours PRN Cough  ondansetron Injectable 4 milliGRAM(s) IV Push every 8 hours PRN Nausea and/or Vomiting      ASSESSMENT/PLAN    #Non-specific body aches/Back Pain/weakness  #Hypomagnesemia   #HIV  #Chronic Pain      Med/surg  Home medications  Continue to monitor electrolytes and correct accordingly.   PT    DVT Prophylaxis: SCDs   MEDICATIONS  (STANDING):  baclofen 10 milliGRAM(s) Oral every 8 hours  bictegravir 50 mG/emtricitabine 200 mG/tenofovir alafenamide 25 mG (BIKTARVY) 1 Tablet(s) Oral daily  magnesium oxide 400 milliGRAM(s) Oral daily  melatonin 5 milliGRAM(s) Oral at bedtime  potassium phosphate IVPB 15 milliMole(s) IV Intermittent once  pregabalin 50 milliGRAM(s) Oral three times a day  QUEtiapine 300 milliGRAM(s) Oral at bedtime  QUEtiapine 100 milliGRAM(s) Oral <User Schedule>    MEDICATIONS  (PRN):  acetaminophen     Tablet .. 650 milliGRAM(s) Oral every 6 hours PRN Temp greater or equal to 38C (100.4F), Mild Pain (1 - 3)  aluminum hydroxide/magnesium hydroxide/simethicone Suspension 30 milliLiter(s) Oral every 4 hours PRN Dyspepsia  benzonatate 100 milliGRAM(s) Oral every 8 hours PRN Cough  ondansetron Injectable 4 milliGRAM(s) IV Push every 8 hours PRN Nausea and/or Vomiting      ASSESSMENT/PLAN    #Non-specific body aches/Back Pain/weakness. Rx withdrawl from   #Hypomagnesemia   #HIV  #Chronic Pain      Med/surg  Home medications including baclofen (decreased home dose) and lyrica as symptoms could represent withdrawl from abruptly stopping these two medications.   NO indication for IV or PO opioids without any objective findings both on this admission and based on prior imaging.   Continue to monitor electrolytes and correct accordingly.   Neuro consulted.   UDS ordered  PT    DVT Prophylaxis: SCDs

## 2022-11-19 NOTE — CONSULT NOTE ADULT - SUBJECTIVE AND OBJECTIVE BOX
Patient is a 32y old  Male who presents with a chief complaint of Weakness/Body Aches (19 Nov 2022 08:29)      CC: weakness    HPI:  32M with PMH of Guillane [place], HIV, PTSD, Night Terrors, Asthma presents with weakness and body aches. On going but slightly worse. Improved in the ER. Admission requested for further evaluation but neuro as per ER physician. CT brain, cervical, chest, ab/pel grossly unremarkable. In the ER 11/10 for upper respiratory symptoms with RVP + For Enterorhinovirus. Discharged from ER as respiratory status was stable. Returns with non-specific symptoms for general body aches, back pain, weakness. Cough has improved. Denies fever, chills, chest pain, increased SOB, nausea, vomiting, abdominal pain/discomfort.     In the ER Tmax 98.3F, HR 76-77, /75, RR 18, SpO2 100% on RA. Lactate 3.6->improved to 1.2 with IVF. Troponin negative, CBC and CMP unremarkable. Magnesium 1.4. CT brain negative for acute process; CT Cervical negative for acute process.  CT chest/ab/pelvis with from reactive bronchitis but no other reported acute abnormality.     Patient continues to report generalized weakness, muscle aches.    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  Asthma  Closed fracture of multiple ribs of right side, initial encounter  Cocaine abuse  Chronic sinusitis  Homeless  History of orthopedic surgery  left arm    FAMILY HISTORY:  FH: HIV infection (Mother)    Social Hx:  Nonsmoker, no drug or alcohol use    MEDICATIONS  (STANDING):  baclofen 10 milliGRAM(s) Oral every 8 hours  bictegravir 50 mG/emtricitabine 200 mG/tenofovir alafenamide 25 mG (BIKTARVY) 1 Tablet(s) Oral daily  magnesium oxide 400 milliGRAM(s) Oral daily  melatonin 5 milliGRAM(s) Oral at bedtime  pregabalin 50 milliGRAM(s) Oral three times a day  QUEtiapine 300 milliGRAM(s) Oral at bedtime  QUEtiapine 100 milliGRAM(s) Oral <User Schedule>  sodium chloride 0.9%. 1000 milliLiter(s) (75 mL/Hr) IV Continuous <Continuous>    Allergies  Ceclor (Unknown)  Toradol (Anaphylaxis; Swelling)  Toradol (Swelling)    ROS: Pertinent positives in HPI, all other ROS were reviewed and are negative.      Vital Signs Last 24 Hrs  T(C): 36.8 (19 Nov 2022 09:23), Max: 36.8 (18 Nov 2022 19:47)  T(F): 98.2 (19 Nov 2022 09:23), Max: 98.3 (18 Nov 2022 19:47)  HR: 68 (19 Nov 2022 09:23) (67 - 77)  BP: 139/78 (19 Nov 2022 09:23) (116/56 - 139/78)  BP(mean): 90 (19 Nov 2022 00:13) (90 - 90)  RR: 17 (19 Nov 2022 09:23) (17 - 18)  SpO2: 99% (19 Nov 2022 09:23) (99% - 100%)    Parameters below as of 19 Nov 2022 09:23  Patient On (Oxygen Delivery Method): room air    Neurological exam:  HF: A x O x 3. Appropriately interactive, normal affect. Speech fluent, No Aphasia or paraphasic errors. Naming /repetition intact   CN: JOCELYN, EOMI, VFF, facial sensation normal, no NLFD, tongue midline, Palate moves equally, SCM equal bilaterally  Motor: No pronator drift, Strength 5/5 in UEs, 3/5 in LEs although effort is suboptimal (+salazar sign bilaterally)   Sens: Intact to light touch   Reflexes: Symmetric and normal . BJ 2+, BR 2+, KJ 2+, AJ 2+, downgoing toes b/l  Coord:  No FNFA, dysmetria, AMAN intact       Labs:   11-19    141  |  108  |  11  ----------------------------<  107<H>  3.7   |  26  |  0.84    Ca    8.0<L>      19 Nov 2022 10:23  Phos  1.7     11-19  Mg     1.6     11-19    TPro  7.7  /  Alb  3.2<L>  /  TBili  0.3  /  DBili  x   /  AST  36  /  ALT  47  /  AlkPhos  58  11-18                              11.2   5.79  )-----------( 350      ( 18 Nov 2022 20:58 )             34.3         A/P:   31 yo man reporting generalized weakness and body aches.  On exam, patient demonstrates suboptimal effort on power testing in legs (+ salazar sign bilaterally).    Recommend:  1. check CPK  2. Urine toxicology  3. Physical therapy    Vinod Sanchez MD  Neurology Attending Physician

## 2022-11-19 NOTE — H&P ADULT - NSHPPHYSICALEXAM_GEN_ALL_CORE
PHYSICAL EXAM:    T(C): 36.6 (11-19-22 @ 05:32), Max: 36.8 (11-18-22 @ 19:47)  HR: 67 (11-19-22 @ 05:32) (67 - 77)  BP: 116/56 (11-19-22 @ 05:32) (116/56 - 121/75)  RR: 17 (11-19-22 @ 05:32) (17 - 18)  SpO2: 100% (11-19-22 @ 05:32) (100% - 100%)    General: AAOx3; NAD  Head: AT/NC  ENT: Moist Mucous Membranes; No Injury  Eyes: EOMI; PERRL  Neck: Non-tender; No JVD  CVS: RRR, S1&S2, No murmur, No edema  Respiratory: Lungs CTA B/L; Normal Respiratory Effort  Abdomen/GI: Soft, non-tender, non-distended, no guarding, no rebound, normal bowel sounds  : No bladder distention, No Brizuela  Extremities: No cyanosis, No clubbing, No edema  MSK: No CVA tenderness, Normal ROM, No injury  Neuro: AAOx3, CNII-XII grossly intact, non-focal  Psych: Appropriate, Cooperative, No depression, No anxiety  Skin: Clean, Dry and Intact PHYSICAL EXAM:    T(C): 36.6 (11-19-22 @ 05:32), Max: 36.8 (11-18-22 @ 19:47)  HR: 67 (11-19-22 @ 05:32) (67 - 77)  BP: 116/56 (11-19-22 @ 05:32) (116/56 - 121/75)  RR: 17 (11-19-22 @ 05:32) (17 - 18)  SpO2: 100% (11-19-22 @ 05:32) (100% - 100%)    General: AAOx3; NAD  Head: AT/NC  ENT: Moist Mucous Membranes; No Injury  Eyes: EOMI; PERRL  Neck: Non-tender; No JVD  CVS: RRR, S1&S2, No murmur, No edema  Respiratory: Lungs CTA B/L; Normal Respiratory Effort  Abdomen/GI: Soft, non-tender, non-distended, no guarding, no rebound, normal bowel sounds  : No bladder distention, No Brizuela  Extremities: No cyanosis, No clubbing, No edema  MSK: No CVA tenderness, Decreased ROM LE bilaterally (it appears he purposeful lack of effort as his leg raise bilaterally was 0/5 strength but observed walking prior to that)  Neuro: AAOx3, CNII-XII grossly intact, non-focal  Psych: Appropriate, Cooperative, No depression, No anxiety  Skin: Clean, Dry and Intact

## 2022-11-20 LAB
ANION GAP SERPL CALC-SCNC: 7 MMOL/L — SIGNIFICANT CHANGE UP (ref 5–17)
BUN SERPL-MCNC: 12 MG/DL — SIGNIFICANT CHANGE UP (ref 7–23)
CALCIUM SERPL-MCNC: 8.6 MG/DL — SIGNIFICANT CHANGE UP (ref 8.5–10.1)
CHLORIDE SERPL-SCNC: 110 MMOL/L — HIGH (ref 96–108)
CO2 SERPL-SCNC: 27 MMOL/L — SIGNIFICANT CHANGE UP (ref 22–31)
CREAT SERPL-MCNC: 0.74 MG/DL — SIGNIFICANT CHANGE UP (ref 0.5–1.3)
EGFR: 123 ML/MIN/1.73M2 — SIGNIFICANT CHANGE UP
GLUCOSE SERPL-MCNC: 99 MG/DL — SIGNIFICANT CHANGE UP (ref 70–99)
MAGNESIUM SERPL-MCNC: 1.6 MG/DL — SIGNIFICANT CHANGE UP (ref 1.6–2.6)
PHOSPHATE SERPL-MCNC: 3.5 MG/DL — SIGNIFICANT CHANGE UP (ref 2.5–4.5)
POTASSIUM SERPL-MCNC: 3.7 MMOL/L — SIGNIFICANT CHANGE UP (ref 3.5–5.3)
POTASSIUM SERPL-SCNC: 3.7 MMOL/L — SIGNIFICANT CHANGE UP (ref 3.5–5.3)
SODIUM SERPL-SCNC: 144 MMOL/L — SIGNIFICANT CHANGE UP (ref 135–145)

## 2022-11-20 PROCEDURE — 99232 SBSQ HOSP IP/OBS MODERATE 35: CPT

## 2022-11-20 RX ORDER — ENOXAPARIN SODIUM 100 MG/ML
40 INJECTION SUBCUTANEOUS EVERY 24 HOURS
Refills: 0 | Status: DISCONTINUED | OUTPATIENT
Start: 2022-11-20 | End: 2022-11-21

## 2022-11-20 RX ADMIN — Medication 50 MILLIGRAM(S): at 13:20

## 2022-11-20 RX ADMIN — Medication 5 MILLIGRAM(S): at 22:01

## 2022-11-20 RX ADMIN — QUETIAPINE FUMARATE 100 MILLIGRAM(S): 200 TABLET, FILM COATED ORAL at 08:42

## 2022-11-20 RX ADMIN — Medication 10 MILLIGRAM(S): at 05:39

## 2022-11-20 RX ADMIN — MAGNESIUM OXIDE 400 MG ORAL TABLET 400 MILLIGRAM(S): 241.3 TABLET ORAL at 17:46

## 2022-11-20 RX ADMIN — Medication 50 MILLIGRAM(S): at 05:40

## 2022-11-20 RX ADMIN — ENOXAPARIN SODIUM 40 MILLIGRAM(S): 100 INJECTION SUBCUTANEOUS at 17:46

## 2022-11-20 RX ADMIN — Medication 50 MILLIGRAM(S): at 22:01

## 2022-11-20 RX ADMIN — QUETIAPINE FUMARATE 300 MILLIGRAM(S): 200 TABLET, FILM COATED ORAL at 22:01

## 2022-11-20 RX ADMIN — Medication 10 MILLIGRAM(S): at 22:01

## 2022-11-20 RX ADMIN — Medication 10 MILLIGRAM(S): at 13:20

## 2022-11-20 RX ADMIN — MAGNESIUM OXIDE 400 MG ORAL TABLET 400 MILLIGRAM(S): 241.3 TABLET ORAL at 08:41

## 2022-11-20 RX ADMIN — BICTEGRAVIR SODIUM, EMTRICITABINE, AND TENOFOVIR ALAFENAMIDE FUMARATE 1 TABLET(S): 30; 120; 15 TABLET ORAL at 09:00

## 2022-11-20 NOTE — PHYSICAL THERAPY INITIAL EVALUATION ADULT - PERTINENT HX OF CURRENT PROBLEM, REHAB EVAL
31 yo M admitted  weakness, low back pain and body aches. 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 As per ER physician, abruptly stopped his baclofen and lyrica 3-4 days prior to admission. CT brain, cervical, chest, ab/pel grossly unremarkable. In the ER 11/10 for upper respiratory symptoms with RVP + For Enterorhinovirus. Discharged from ER as respiratory status was stable. MR of Thoracic and Lumbar spine with contrast 9/2/2022 negative with No cord or cauda equina compression. (-) COVID PCR.

## 2022-11-20 NOTE — PHYSICAL THERAPY INITIAL EVALUATION ADULT - GENERAL OBSERVATIONS, REHAB EVAL
Patient received in bed in TCU.  Denied pain. Patient c/o cough, runny nose at present. Dressed in street clothes.

## 2022-11-20 NOTE — PROGRESS NOTE ADULT - ASSESSMENT
ASSESSMENT/PLAN    #Non-specific body aches/Back Pain/weakness. Rx withdrawl from   #Hypomagnesemia/Hypophosphatemia.  #HIV  #Chronic Pain/Neuropathy  #Night Terros/Mood disorder NOS      Med/surg  Home medications including baclofen (decreased home dose) and lyrica as symptoms could represent withdrawl from abruptly stopping these two medications.   NO indication for IV or PO opioids without any objective findings both on this admission and based on prior imaging.   Continue to monitor electrolytes and correct accordingly.   Neuro consulted. No acute neurologic issue likely  UDS ordered and still pending.   PT    DVT Prophylaxis: switch to lovenox subq (11/20)  Disposition: No acute process identified. Needs to continue take medications as prescribed. May benefit from outpatient rheumatology referral from his primary medical doctor (advised him as such), Patient reports being from shelter. SW consulted. PT evaluation to gauge functional status.

## 2022-11-20 NOTE — PHYSICAL THERAPY INITIAL EVALUATION ADULT - GAIT DEVIATIONS NOTED, PT EVAL
Patient ambulating by dragging legs forward, not picking up toes, using B UEs to bear the weight./decreased cabrera/decreased stride length

## 2022-11-20 NOTE — PHYSICAL THERAPY INITIAL EVALUATION ADULT - MODALITIES TREATMENT COMMENTS
Patient able to correct some gait deviations with verbal cues, but mostly dragging feet.  Patient sat in the chair x 1 min then independently transferred himself back to bed without RW.  "I'm tired from the meds they gave me".  Patient left in bed as found. Alarm on call bell in reach, RN informed of session/status.

## 2022-11-20 NOTE — PROGRESS NOTE ADULT - SUBJECTIVE AND OBJECTIVE BOX
CHIEF COMPLAINT: body aches/weakness.    SUBJECTIVE/SIGNIFICANT INTERVAL EVENTS/OVERNIGHT EVENTS:  11/20: Mild improvement in pain/aches. Non-specific. Intermittently back. Lower legs. No fever, chills, chest pain, SOB, nausea, vomiting, abdominal pains/discomfort.    Review of Systems: 14 Point review of systems reviewed and reported as negative unless otherwise stated above    FROM H&P:  "32M with PMH of [place], HIV, PTSD, Night Terrors, Asthma presents with weakness, low back pain and body aches. On going/chronic but slightly worse. Improved in the ER. Admission requested for further evaluation but neuro as per ER physician. Abruptly stopped his baclofen and lyrica 3-4 days prior to admissmion. CT brain, cervical, chest, ab/pel grossly unremarkable. In the ER 11/10 for upper respiratory symptoms with RVP + For Enterorhinovirus. Discharged from ER as respiratory status was stable. Those symptoms have improved.  Returns with non-specific symptoms for general body aches, back pain, weakness. Cough has improved. Denies fever, chills, chest pain, increased SOB, nausea, vomiting, abdominal pain/discomfort.     In the ER Tmax 98.3F, HR 76-77, /75, RR 18, SpO2 100% on RA. Lactate 3.6->improved to 1.2 with IVF. Troponin negative, CBC and CMP unremarkable. Magnesium 1.4. CT brain negative for acute process; CT Cervical negative for acute process.  CT chest/ab/pelvis with from reactive bronchitis but no other reported acute abnormality.     Also of note, MR of Thoracic and Lumbar spine with contrast 9/2/2022 negative with No cord or cauda equina compression. No abnormal cord signal or enhancement."    PHYSICAL EXAM:    T(C): 36.6 (11-20-22 @ 09:31), Max: 37.2 (11-19-22 @ 15:58)  HR: 65 (11-20-22 @ 09:31) (65 - 78)  BP: 119/74 (11-20-22 @ 09:31) (119/74 - 132/65)  RR: 17 (11-20-22 @ 09:31) (17 - 17)  SpO2: 99% (11-20-22 @ 09:31) (99% - 99%)        General: AAOx3; NAD  Head: AT/NC  ENT: Moist Mucous Membranes; No Injury  Eyes: EOMI; PERRL  Neck: Non-tender; No JVD  CVS: RRR, S1&S2, No murmur, No edema  Respiratory: Lungs CTA B/L; Normal Respiratory Effort  Abdomen/GI: Soft, non-tender, non-distended, no guarding, no rebound, normal bowel sounds  : No bladder distention, No Brizuela  Extremities: No cyanosis, No clubbing, No edema  MSK: No CVA tenderness, Decreased ROM LE bilaterally (it appears he has purposeful lack of effort as his leg raise bilaterally was 0/5 strength with mild improvement to 1-2/5 strength but observed walking with some unsteadiness  prior to that)  Neuro: AAOx3, CNII-XII grossly intact, non-focal  Psych: Appropriate, Cooperative,  Skin: Clean, Dry and Intact      LABS:                          11.2   5.79  )-----------( 350      ( 18 Nov 2022 20:58 )             34.3     11-20    144  |  110<H>  |  12  ----------------------------<  99  3.7   |  27  |  0.74    Ca    8.6      20 Nov 2022 05:57  Phos  3.5     11-20  Mg     1.6     11-20    TPro  7.7  /  Alb  3.2<L>  /  TBili  0.3  /  DBili  x   /  AST  36  /  ALT  47  /  AlkPhos  58  11-18    SARS-CoV-2: NotDetec (10 Nov 2022 09:30)  COVID-19 PCR: NotDetec (18 Sep 2022 22:39)  COVID-19 PCR: NotDetec (16 Sep 2022 06:48)  SARS-CoV-2: NotDetec (11 Sep 2022 23:31)  SARS-CoV-2: NotDetec (31 Aug 2022 05:46)  COVID-19 PCR: NotDetec (15 Aug 2022 09:10)  COVID-19 PCR: NotDetec (12 Aug 2022 18:30)  SARS-CoV-2: NotDetec (09 Aug 2022 04:13)    CAPILLARY BLOOD GLUCOSE          Culture - Blood (collected 19 Nov 2022 01:21)  Source: .Blood None  Preliminary Report (20 Nov 2022 07:01):    No growth to date.    Culture - Blood (collected 19 Nov 2022 01:21)  Source: .Blood None  Preliminary Report (20 Nov 2022 07:01):    No growth to date.    `Creatine Kinase, Serum: 142 U/L (11.19.22 @ 10:23)       RADIOLOGY:  `  < from: CT Abdomen and Pelvis w/ IV Cont (11.18.22 @ 22:42) >    IMPRESSION:  No evidence of acute traumatic injury to the chest, abdomen, or pelvis.    Scattered tree-in bud opacities in the right upper lobe and right lower   lobe likely reflective of a bronchiolitis.    < end of copied text >    < from: CT Chest w/ IV Cont (11.18.22 @ 22:40) >    IMPRESSION:  No evidence of acute traumatic injury to the chest, abdomen, or pelvis.    Scattered tree-in bud opacities in the right upper lobe and right lower   lobe likely reflective of a bronchiolitis.    < end of copied text >    < from: CT Cervical Spine No Cont (11.18.22 @ 22:38) >    EKG:  `< from: 12 Lead ECG (11.18.22 @ 20:45) >  Diagnosis Line Normal sinus rhythm  Normal ECG  When compared with ECG of 20-MAY-2022 21:10,  No significant change was found    < end of copied text >      ECHO:  `< from: TTE Echo Complete w/o Contrast w/ Doppler (04.28.21 @ 12:22) >   Summary     The mitral valve leaflets are well seen thin and pliable; preserved   leaflet excursion noted. Trace mitral valve regurgitation noted.   The aortic valve leaflets are well seen with normal valve morphology;   preserved leaflet excursion noted. No aortic valve insufficiency noted.   The tricuspid valve leaflets are well seen and appear thin and pliable   with preserved leaflets excursion. Mild tricuspid regurgitation noted.   Normal appearing left atrium.   Left ventricular wall motion is normal.   The left ventricle is normal in size.   Estimated left ventricular ejection fraction is 60 %.   The right atrium is normal.   Normal appearing right ventricle structure and function.    < end of copied text >              I personally reviewed labs, imaging, ekg, orders and vitals.    Discussed case with:  [X]RN  [X]CM/ROSALINO  [X]Patient  []Family  [X]Specialist: Neurology        MEDICATIONS  (STANDING):  baclofen 10 milliGRAM(s) Oral every 8 hours  bictegravir 50 mG/emtricitabine 200 mG/tenofovir alafenamide 25 mG (BIKTARVY) 1 Tablet(s) Oral daily  magnesium oxide 400 milliGRAM(s) Oral two times a day with meals  melatonin 5 milliGRAM(s) Oral at bedtime  pregabalin 50 milliGRAM(s) Oral three times a day  QUEtiapine 300 milliGRAM(s) Oral at bedtime  QUEtiapine 100 milliGRAM(s) Oral <User Schedule>    MEDICATIONS  (PRN):  acetaminophen     Tablet .. 650 milliGRAM(s) Oral every 6 hours PRN Temp greater or equal to 38C (100.4F), Mild Pain (1 - 3)  aluminum hydroxide/magnesium hydroxide/simethicone Suspension 30 milliLiter(s) Oral every 4 hours PRN Dyspepsia  benzonatate 100 milliGRAM(s) Oral every 8 hours PRN Cough  ondansetron Injectable 4 milliGRAM(s) IV Push every 8 hours PRN Nausea and/or Vomiting

## 2022-11-20 NOTE — PHYSICAL THERAPY INITIAL EVALUATION ADULT - ADDITIONAL COMMENTS
No cane at bedside. Patient was at 3 different hospitals in September, last time sent to [facility], but signed out AMA.

## 2022-11-21 ENCOUNTER — TRANSCRIPTION ENCOUNTER (OUTPATIENT)
Age: 32
End: 2022-11-21

## 2022-11-21 VITALS
TEMPERATURE: 98 F | HEART RATE: 60 BPM | RESPIRATION RATE: 17 BRPM | DIASTOLIC BLOOD PRESSURE: 78 MMHG | OXYGEN SATURATION: 100 % | SYSTOLIC BLOOD PRESSURE: 130 MMHG

## 2022-11-21 PROCEDURE — 99239 HOSP IP/OBS DSCHRG MGMT >30: CPT

## 2022-11-21 RX ORDER — FLUTICASONE PROPIONATE 50 MCG
1 SPRAY, SUSPENSION NASAL
Qty: 1 | Refills: 0
Start: 2022-11-21

## 2022-11-21 RX ORDER — QUETIAPINE FUMARATE 200 MG/1
1 TABLET, FILM COATED ORAL
Qty: 30 | Refills: 0
Start: 2022-11-21 | End: 2022-12-20

## 2022-11-21 RX ORDER — MAGNESIUM OXIDE 400 MG ORAL TABLET 241.3 MG
1 TABLET ORAL
Qty: 60 | Refills: 0
Start: 2022-11-21 | End: 2022-12-20

## 2022-11-21 RX ORDER — LANOLIN ALCOHOL/MO/W.PET/CERES
1 CREAM (GRAM) TOPICAL
Qty: 30 | Refills: 0
Start: 2022-11-21 | End: 2022-12-20

## 2022-11-21 RX ORDER — BACLOFEN 100 %
1 POWDER (GRAM) MISCELLANEOUS
Qty: 90 | Refills: 0
Start: 2022-11-21 | End: 2022-12-20

## 2022-11-21 RX ORDER — QUETIAPINE FUMARATE 200 MG/1
1 TABLET, FILM COATED ORAL
Qty: 0 | Refills: 0 | DISCHARGE

## 2022-11-21 RX ORDER — LANOLIN ALCOHOL/MO/W.PET/CERES
1 CREAM (GRAM) TOPICAL
Qty: 0 | Refills: 0 | DISCHARGE

## 2022-11-21 RX ORDER — FLUTICASONE PROPIONATE 50 MCG
1 SPRAY, SUSPENSION NASAL
Refills: 0 | Status: DISCONTINUED | OUTPATIENT
Start: 2022-11-21 | End: 2022-11-21

## 2022-11-21 RX ADMIN — QUETIAPINE FUMARATE 100 MILLIGRAM(S): 200 TABLET, FILM COATED ORAL at 09:38

## 2022-11-21 RX ADMIN — Medication 10 MILLIGRAM(S): at 06:17

## 2022-11-21 RX ADMIN — Medication 1 SPRAY(S): at 14:48

## 2022-11-21 RX ADMIN — MAGNESIUM OXIDE 400 MG ORAL TABLET 400 MILLIGRAM(S): 241.3 TABLET ORAL at 16:38

## 2022-11-21 RX ADMIN — MAGNESIUM OXIDE 400 MG ORAL TABLET 400 MILLIGRAM(S): 241.3 TABLET ORAL at 09:37

## 2022-11-21 RX ADMIN — BICTEGRAVIR SODIUM, EMTRICITABINE, AND TENOFOVIR ALAFENAMIDE FUMARATE 1 TABLET(S): 30; 120; 15 TABLET ORAL at 09:37

## 2022-11-21 RX ADMIN — Medication 50 MILLIGRAM(S): at 13:38

## 2022-11-21 RX ADMIN — Medication 50 MILLIGRAM(S): at 06:52

## 2022-11-21 RX ADMIN — Medication 650 MILLIGRAM(S): at 00:11

## 2022-11-21 RX ADMIN — Medication 10 MILLIGRAM(S): at 13:37

## 2022-11-21 NOTE — DISCHARGE NOTE PROVIDER - DISCHARGE SERVICE FOR PATIENT
Incoming erx from pharmacy to refill:  Metoprolol tartrate  Last seen 9-8-22 per note states recommend switching to carvedilol 12.5mg bid.  On 10-13 there is a telephone encounter I dont see any notes but metoprolol was stopped and carvedilol was sent to pharmacy.  Metoprolol is still on med list.  I called patient left message to call back to discuss.  Message to Dr MONTOYA please advise.   on the discharge service for the patient. I have reviewed and made amendments to the documentation where necessary.

## 2022-11-21 NOTE — DISCHARGE NOTE NURSING/CASE MANAGEMENT/SOCIAL WORK - NSDCPEFALRISK_GEN_ALL_CORE
For information on Fall & Injury Prevention, visit: [email or web address] OR  [email or web address] OR  https://www.cdc.gov/steadi/patient.html

## 2022-11-21 NOTE — DISCHARGE NOTE NURSING/CASE MANAGEMENT/SOCIAL WORK - PATIENT PORTAL LINK FT
You can access the FollowMyHealth Patient Portal offered by [facility] by registering at the following website: [email or web address]. By joining [employer]’s FollowMyHealth portal, you will also be able to view your health information using other applications (apps) compatible with our system.

## 2022-11-21 NOTE — DISCHARGE NOTE PROVIDER - CARE PROVIDER_API CALL
Yolis Savage ()  Family Medicine  [address] [place] [address]  Phone: [phone]  Fax: [phone]  Established Patient  Follow Up Time: 1 week

## 2022-11-21 NOTE — DISCHARGE NOTE PROVIDER - HOSPITAL COURSE
FROM H&P:    "  ASSESSMENT/PLAN    #Non-specific body aches/Back Pain/weakness. Rx withdrawl from   #Hypomagnesemia/Hypophosphatemia.  #HIV  #Chronic Pain/Neuropathy  #Night Terros/Mood disorder NOS      Med/surg  Home medications including baclofen (decreased home dose) and lyrica as symptoms could represent withdrawl from abruptly stopping these two medications.   NO indication for IV or PO opioids without any objective findings both on this admission and based on prior imaging.   Continue to monitor electrolytes and correct accordingly.   Neuro consulted. No acute neurologic issue likely  UDS ordered and still pending.   PT  Patient deferred BRAYDEN. Says he is going to stay with aunt. Discharge home in [place] condition and outpatient follow up with primary medical doctor and psychiatrist  T(C): 36.5 (11-21-22 @ 08:40), Max: 37.3 (11-20-22 @ 23:30)  HR: 60 (11-21-22 @ 08:40) (60 - 95)  BP: 130/78 (11-21-22 @ 08:40) (119/64 - 130/78)  RR: 17 (11-21-22 @ 08:40) (17 - 17)  SpO2: 100% (11-21-22 @ 08:40) (100% - 100%)  General: AAOx3; NAD  Head: AT/NC  ENT: Moist Mucous Membranes; No Injury  Eyes: EOMI; PERRL  Neck: Non-tender; No JVD  CVS: RRR, S1&S2, No murmur, No edema  Respiratory: Lungs CTA B/L; Normal Respiratory Effort  Abdomen/GI: Soft, non-tender, non-distended, no guarding, no rebound, normal bowel sounds  : No bladder distention, No Brizuela  Extremities: No cyanosis, No clubbing, No edema  MSK: No CVA tenderness, Decreased ROM LE bilaterally (it appears he has purposeful lack of effort as his leg raise bilaterally was 0/5 strength with mild improvement to 4/5 strength but observed walking with some unsteadiness  prior to that)  Neuro: AAOx3, CNII-XII grossly intact, non-focal  Discharge Management: 37 minutes  Date of Discharge/Service: 11/21/2022 FROM H&P:    "  ASSESSMENT/PLAN    #Non-specific body aches/Back Pain/weakness. Rx withdrawl from   #Hypomagnesemia/Hypophosphatemia.  #HIV  #Chronic Pain/Neuropathy  #Night Terros/Mood disorder NOS      Med/surg  Home medications including baclofen (decreased home dose) and lyrica as symptoms could represent withdrawl from abruptly stopping these two medications.   NO indication for IV or PO opioids without any objective findings both on this admission and based on prior imaging.   Continue to monitor electrolytes and correct accordingly.   Neuro consulted. No acute neurologic issue likely  UDS ordered and still pending.   PT  Patient deferred BRAYDEN and home care. Says he is going to stay with aunt. Discharge home in [place] condition and outpatient follow up with primary medical doctor and psychiatrist  T(C): 36.5 (11-21-22 @ 08:40), Max: 37.3 (11-20-22 @ 23:30)  HR: 60 (11-21-22 @ 08:40) (60 - 95)  BP: 130/78 (11-21-22 @ 08:40) (119/64 - 130/78)  RR: 17 (11-21-22 @ 08:40) (17 - 17)  SpO2: 100% (11-21-22 @ 08:40) (100% - 100%)  General: AAOx3; NAD  Head: AT/NC  ENT: Moist Mucous Membranes; No Injury  Eyes: EOMI; PERRL  Neck: Non-tender; No JVD  CVS: RRR, S1&S2, No murmur, No edema  Respiratory: Lungs CTA B/L; Normal Respiratory Effort  Abdomen/GI: Soft, non-tender, non-distended, no guarding, no rebound, normal bowel sounds  : No bladder distention, No Brizuela  Extremities: No cyanosis, No clubbing, No edema  MSK: No CVA tenderness, Decreased ROM LE bilaterally (it appears he has purposeful lack of effort as his leg raise bilaterally was 0/5 strength with mild improvement to 4/5 strength but observed walking with some unsteadiness  prior to that)  Neuro: AAOx3, CNII-XII grossly intact, non-focal  Discharge Management: 37 minutes  Date of Discharge/Service: 11/21/2022

## 2022-11-21 NOTE — DISCHARGE NOTE PROVIDER - NSDCMRMEDTOKEN_GEN_ALL_CORE_FT
baclofen 10 mg oral tablet: 1 tab(s) orally every 8 hours  bictegravir/emtricitabine/tenofovir 50 mg-200 mg-25 mg oral tablet: 1 tab(s) orally once a day MDD:1  fluticasone 50 mcg/inh nasal spray: 1 spray(s) nasal 2 times a day  magnesium oxide 400 mg oral tablet: 1 tab(s) orally 2 times a day (with meals)  melatonin 5 mg oral tablet: 1 tab(s) orally once a day (at bedtime)  pregabalin 50 mg oral capsule: 1 cap(s) orally 3 times a day MDD:3  QUEtiapine 100 mg oral tablet: 1 tab(s) orally once a day at 8:00am  SEROquel 300 mg oral tablet: 1 tab(s) orally once a day (at bedtime)

## 2022-11-21 NOTE — DISCHARGE NOTE NURSING/CASE MANAGEMENT/SOCIAL WORK - NSDCVIVACCINE_GEN_ALL_CORE_FT
Tdap; 09-Jul-2022 05:12; Annabelle Curiel (RN); [employer]; [id number] (Exp. Date: 11-Mar-2024); IntraMuscular; Deltoid Right.; 0.5 milliLiter(s); VIS (VIS Published: 09-May-2013, VIS Presented: 09-Jul-2022);

## 2022-11-22 ENCOUNTER — EMERGENCY (EMERGENCY)
Facility: HOSPITAL | Age: 32
LOS: 1 days | Discharge: HOME | End: 2022-11-22
Attending: STUDENT IN AN ORGANIZED HEALTH CARE EDUCATION/TRAINING PROGRAM
Payer: COMMERCIAL

## 2022-11-22 VITALS
WEIGHT: 220.02 LBS | DIASTOLIC BLOOD PRESSURE: 81 MMHG | HEIGHT: 72 IN | SYSTOLIC BLOOD PRESSURE: 134 MMHG | RESPIRATION RATE: 20 BRPM | OXYGEN SATURATION: 99 % | TEMPERATURE: 98 F | HEART RATE: 107 BPM

## 2022-11-22 DIAGNOSIS — Z98.890 OTHER SPECIFIED POSTPROCEDURAL STATES: Chronic | ICD-10-CM

## 2022-11-22 LAB
ALBUMIN SERPL ELPH-MCNC: 4.2 G/DL — SIGNIFICANT CHANGE UP (ref 3.3–5.2)
ALP SERPL-CCNC: 68 U/L — SIGNIFICANT CHANGE UP (ref 40–120)
ALT FLD-CCNC: 34 U/L — SIGNIFICANT CHANGE UP
ANION GAP SERPL CALC-SCNC: 11 MMOL/L — SIGNIFICANT CHANGE UP (ref 5–17)
AST SERPL-CCNC: 62 U/L — HIGH
BASOPHILS # BLD AUTO: 0.02 K/UL — SIGNIFICANT CHANGE UP (ref 0–0.2)
BASOPHILS NFR BLD AUTO: 0.3 % — SIGNIFICANT CHANGE UP (ref 0–2)
BILIRUB SERPL-MCNC: 0.5 MG/DL — SIGNIFICANT CHANGE UP (ref 0.4–2)
BUN SERPL-MCNC: 12.3 MG/DL — SIGNIFICANT CHANGE UP (ref 8–20)
CALCIUM SERPL-MCNC: 9.5 MG/DL — SIGNIFICANT CHANGE UP (ref 8.4–10.5)
CHLORIDE SERPL-SCNC: 99 MMOL/L — SIGNIFICANT CHANGE UP (ref 96–108)
CK MB CFR SERPL CALC: 1.1 NG/ML — SIGNIFICANT CHANGE UP (ref 0–6.7)
CK SERPL-CCNC: 194 U/L — SIGNIFICANT CHANGE UP (ref 30–200)
CO2 SERPL-SCNC: 27 MMOL/L — SIGNIFICANT CHANGE UP (ref 22–29)
CREAT SERPL-MCNC: 0.77 MG/DL — SIGNIFICANT CHANGE UP (ref 0.5–1.3)
EGFR: 121 ML/MIN/1.73M2 — SIGNIFICANT CHANGE UP
EOSINOPHIL # BLD AUTO: 0.02 K/UL — SIGNIFICANT CHANGE UP (ref 0–0.5)
EOSINOPHIL NFR BLD AUTO: 0.3 % — SIGNIFICANT CHANGE UP (ref 0–6)
GLUCOSE SERPL-MCNC: 80 MG/DL — SIGNIFICANT CHANGE UP (ref 70–99)
HCT VFR BLD CALC: 38.6 % — LOW (ref 39–50)
HGB BLD-MCNC: 12.4 G/DL — LOW (ref 13–17)
IMM GRANULOCYTES NFR BLD AUTO: 0.3 % — SIGNIFICANT CHANGE UP (ref 0–0.9)
LYMPHOCYTES # BLD AUTO: 3.11 K/UL — SIGNIFICANT CHANGE UP (ref 1–3.3)
LYMPHOCYTES # BLD AUTO: 46.8 % — HIGH (ref 13–44)
MCHC RBC-ENTMCNC: 28.8 PG — SIGNIFICANT CHANGE UP (ref 27–34)
MCHC RBC-ENTMCNC: 32.1 GM/DL — SIGNIFICANT CHANGE UP (ref 32–36)
MCV RBC AUTO: 89.8 FL — SIGNIFICANT CHANGE UP (ref 80–100)
MONOCYTES # BLD AUTO: 0.79 K/UL — SIGNIFICANT CHANGE UP (ref 0–0.9)
MONOCYTES NFR BLD AUTO: 11.9 % — SIGNIFICANT CHANGE UP (ref 2–14)
NEUTROPHILS # BLD AUTO: 2.69 K/UL — SIGNIFICANT CHANGE UP (ref 1.8–7.4)
NEUTROPHILS NFR BLD AUTO: 40.4 % — LOW (ref 43–77)
PLATELET # BLD AUTO: 328 K/UL — SIGNIFICANT CHANGE UP (ref 150–400)
POTASSIUM SERPL-MCNC: 5.8 MMOL/L — HIGH (ref 3.5–5.3)
POTASSIUM SERPL-SCNC: 5.8 MMOL/L — HIGH (ref 3.5–5.3)
PROT SERPL-MCNC: 8.4 G/DL — SIGNIFICANT CHANGE UP (ref 6.6–8.7)
RBC # BLD: 4.3 M/UL — SIGNIFICANT CHANGE UP (ref 4.2–5.8)
RBC # FLD: 13.9 % — SIGNIFICANT CHANGE UP (ref 10.3–14.5)
SODIUM SERPL-SCNC: 137 MMOL/L — SIGNIFICANT CHANGE UP (ref 135–145)
WBC # BLD: 6.65 K/UL — SIGNIFICANT CHANGE UP (ref 3.8–10.5)
WBC # FLD AUTO: 6.65 K/UL — SIGNIFICANT CHANGE UP (ref 3.8–10.5)

## 2022-11-22 PROCEDURE — 99284 EMERGENCY DEPT VISIT MOD MDM: CPT

## 2022-11-22 RX ORDER — SODIUM CHLORIDE 9 MG/ML
1000 INJECTION INTRAMUSCULAR; INTRAVENOUS; SUBCUTANEOUS ONCE
Refills: 0 | Status: COMPLETED | OUTPATIENT
Start: 2022-11-22 | End: 2022-11-22

## 2022-11-22 RX ORDER — DIAZEPAM 5 MG
5 TABLET ORAL ONCE
Refills: 0 | Status: DISCONTINUED | OUTPATIENT
Start: 2022-11-22 | End: 2022-11-22

## 2022-11-22 RX ADMIN — Medication 5 MILLIGRAM(S): at 22:54

## 2022-11-22 NOTE — ED PROVIDER NOTE - NS ED ATTENDING STATEMENT MOD
This was a shared visit with the MADDY. I reviewed and verified the documentation and independently performed the documented:

## 2022-11-22 NOTE — ED PROVIDER NOTE - NSFOLLOWUPINSTRUCTIONS_ED_ALL_ED_FT
Viral Illness, Adult      Viruses are tiny germs that can get into a person's body and cause illness. There are many different types of viruses, and they cause many types of illness. Viral illnesses can range from mild to severe. They can affect various parts of the body.    Short-term conditions that are caused by a virus include colds and the flu (influenza). Long-term conditions that are caused by a virus include herpes, shingles, and HIV (human immunodeficiency virus) infection. A few viruses have been linked to certain cancers.      What are the causes?    Many types of viruses can cause illness. Viruses invade cells in your body, multiply, and cause the infected cells to work abnormally or die. When these cells die, they release more of the virus. When this happens, you develop symptoms of the illness, and the virus continues to spread to other cells. If the virus takes over the function of the cell, it can cause the cell to divide and grow out of control. This happens when a virus causes cancer.    Different viruses get into the body in different ways. You can get a virus by:  •Swallowing food or water that has come in contact with the virus (is contaminated).      •Breathing in droplets that have been coughed or sneezed into the air by an infected person.      •Touching a surface that has been contaminated with the virus and then touching your eyes, nose, or mouth.      •Being bitten by an insect or animal that carries the virus.      •Having sexual contact with a person who is infected with the virus.      •Being exposed to blood or fluids that contain the virus, either through an open cut or during a transfusion.      If a virus enters your body, your body's defense system (immune system) will try to fight the virus. You may be at higher risk for a viral illness if your immune system is weak.      What are the signs or symptoms?    You may have these symptoms, depending on the type of virus and the location of the cells that it invades:•Cold and flu viruses:  •Fever.      •Headache.      •Sore throat.      •Muscle aches.      •Stuffy nose (nasal congestion).      •Cough.      •Digestive system (gastrointestinal) viruses:  •Fever.      •Pain in the abdomen.      •Nausea.      •Diarrhea.      •Liver viruses (hepatitis):  •Loss of appetite.      •Tiredness.      •Skin or the white parts of your eyes turning yellow (jaundice).      •Brain and spinal cord viruses:  •Fever.      •Headache.      •Stiff neck.      •Nausea and vomiting.      •Confusion or sleepiness.      •Skin viruses:  •Warts.      •Itching.      •Rash.      •Sexually transmitted viruses:  •Discharge.      •Swelling.      •Redness.      •Rash.          How is this diagnosed?    This condition may be diagnosed based on one or more of the following:  •Symptoms.      •Medical history.      •Physical exam.      •Blood test, sample of mucus from your lungs (sputum sample), stool sample, or a swab of body fluids or a skin sore (lesion).        How is this treated?    Viruses can be hard to treat because they live within cells. Antibiotic medicines do not treat viruses because these medicines do not get inside cells. Treatment for a viral illness may include:  •Resting and drinking plenty of fluids.      •Medicines to relieve symptoms. These can include over-the-counter medicine for pain and fever, medicines for cough or congestion, and medicines to relieve diarrhea.      •Antiviral medicines. These medicines are available only for certain types of viruses.      Some viral illnesses can be prevented with vaccinations. A common example is the flu shot.      Follow these instructions at home:    Medicines     •Take over-the-counter and prescription medicines only as told by your health care provider.      •If you were prescribed an antiviral medicine, take it as told by your health care provider. Do not stop taking the antiviral even if you start to feel better.    •Be aware of when antibiotics are needed and when they are not needed. Antibiotics do not treat viruses. You may get an antibiotic if your health care provider thinks that you may have, or are at risk for, a bacterial infection and you have a viral infection.  •Do not ask for an antibiotic prescription if you have been diagnosed with a viral illness. Antibiotics will not make your illness go away faster.      •Frequently taking antibiotics when they are not needed can lead to antibiotic resistance. When this develops, the medicine no longer works against the bacteria that it normally fights.          General instructions      •Drink enough fluids to keep your urine pale yellow.      •Rest as much as possible.      •Return to your normal activities as told by your health care provider. Ask your health care provider what activities are safe for you.      •Keep all follow-up visits as told by your health care provider. This is important.        How is this prevented?     To reduce your risk of viral illness:  •Wash your hands often with soap and water for at least 20 seconds. If soap and water are not available, use hand .      •Avoid touching your nose, eyes, and mouth, especially if you have not washed your hands recently.      •If anyone in your household has a viral infection, clean all household surfaces that may have been in contact with the virus. Use soap and hot water. You may also use bleach that you have added water to (diluted).      •Stay away from people who are sick with symptoms of a viral infection.      •Do not share items such as toothbrushes and water bottles with other people.      •Keep your vaccinations up to date. This includes getting a yearly flu shot.      •Eat a healthy diet and get plenty of rest.        Contact a health care provider if:    •You have symptoms of a viral illness that do not go away.      •Your symptoms come back after going away.      •Your symptoms get worse.        Get help right away if you have:    •Trouble breathing.      •A severe headache or a stiff neck.      •Severe vomiting or pain in your abdomen.      These symptoms may represent a serious problem that is an emergency. Do not wait to see if the symptoms will go away. Get medical help right away. Call your local emergency services (911 in the [place] Do not drive yourself to the hospital.       Summary    •Viruses are types of germs that can get into a person's body and cause illness. Viral illnesses can range from mild to severe. They can affect various parts of the body.      •Viruses can be hard to treat. There are medicines to relieve symptoms, and there are some antiviral medicines.      •If you were prescribed an antiviral medicine, take it as told by your health care provider. Do not stop taking the antiviral even if you start to feel better.      •Contact a health care provider if you have symptoms of a viral illness that do not go away.

## 2022-11-22 NOTE — ED PROVIDER NOTE - PATIENT PORTAL LINK FT
You can access the FollowMyHealth Patient Portal offered by [facility] by registering at the following website: [email or web address]. By joining [employer]’s FollowMyHealth portal, you will also be able to view your health information using other applications (apps) compatible with our system.

## 2022-11-22 NOTE — ED PROVIDER NOTE - PROGRESS NOTE DETAILS
reviewed lab work, covid noted - quarantine supportive care and hydration pt stsating upon discharge currently in [place] cant go back because he is covid +   patient spoke to [employer] emergency hotline number will be placed at 8 am but needs to stay to then

## 2022-11-22 NOTE — ED ADULT TRIAGE NOTE - CHIEF COMPLAINT QUOTE
C/O generalized weakness, fatigue, body aches, cough and congestion. Was seen at [facility] yesterday and diagnosed with a viral infection. Reports he recently got over PNA. HX of HIV and Asthma.

## 2022-11-22 NOTE — ED PROVIDER NOTE - OBJECTIVE STATEMENT
pt is a 31 y/o male with a pmhx of HIV last viral load undetectable presenting to the ed for evaluation. pt reports has been experiencing body aches cough congestion and weakness. pt states bilateral legs with cramping. pt was at [facility] recently was admitted neurology saw patient, pt recently stopped lyrica gapabentin but is taking medication again. pt denies cp sob palpitations abd pain nausea vomiting diarrhea back pain pt is a 33 y/o male with a pmhx of HIV last viral load undetectable presenting to the ed for evaluation. pt reports has been experiencing body aches cough congestion and weakness. pt states bilateral legs with cramping. pt was at [facility] recently was admitted neurology saw patient, pt recently stopped Lyrica gapabentin but is taking medication again. pt denies cp sob palpitations abd pain nausea vomiting diarrhea back pain

## 2022-11-22 NOTE — ED PROVIDER NOTE - PHYSICAL EXAMINATION
Const: Awake, alert and oriented. In no acute distress. Well appearing.  HEENT: NC/AT. Moist mucous membranes.  Eyes: No scleral icterus. EOMI.  Neck:. Soft and supple. Full ROM without pain.  Cardiac: +S1/S2. No murmurs. Peripheral pulses 2+ and symmetric. No LE edema.  Resp: Speaking in full sentences. No evidence of respiratory distress. No wheezes, rales or rhonchi.  Abd: Soft, non-tender, non-distended. Normal bowel sounds in all 4 quadrants. No guarding or rebound.  Back: Spine midline and non-tender. No CVAT.  Skin: No rashes, abrasions or lacerations.  Lymph: No cervical lymphadenopathy.  Neuro: Awake, alert & oriented x 3. CN II-XII intact finger to nose intact neurovasculary intact muscle strength 5/5 x 4 extremities gait without ataxia reflexes itnact

## 2022-11-22 NOTE — ED ADULT NURSE NOTE - CAS TRG GENERAL AIRWAY, MLM
Other (Free Text): Saline injections today. (5 ccs). Render Risk Assessment In Note?: no Note Text (......Xxx Chief Complaint.): This diagnosis correlates with the Patent Detail Level: Zone

## 2022-11-23 LAB
FLUAV AG NPH QL: SIGNIFICANT CHANGE UP
FLUBV AG NPH QL: SIGNIFICANT CHANGE UP
POTASSIUM SERPL-MCNC: 3.8 MMOL/L — SIGNIFICANT CHANGE UP (ref 3.5–5.3)
POTASSIUM SERPL-SCNC: 3.8 MMOL/L — SIGNIFICANT CHANGE UP (ref 3.5–5.3)
RSV RNA NPH QL NAA+NON-PROBE: SIGNIFICANT CHANGE UP
SARS-COV-2 RNA SPEC QL NAA+PROBE: DETECTED

## 2022-11-23 PROCEDURE — 99220: CPT

## 2022-11-23 PROCEDURE — 71046 X-RAY EXAM CHEST 2 VIEWS: CPT | Mod: 26

## 2022-11-23 RX ORDER — QUETIAPINE FUMARATE 200 MG/1
100 TABLET, FILM COATED ORAL
Refills: 0 | Status: DISCONTINUED | OUTPATIENT
Start: 2022-11-23 | End: 2022-11-30

## 2022-11-23 RX ORDER — GABAPENTIN 400 MG/1
300 CAPSULE ORAL THREE TIMES A DAY
Refills: 0 | Status: DISCONTINUED | OUTPATIENT
Start: 2022-11-23 | End: 2022-11-30

## 2022-11-23 RX ORDER — MAGNESIUM OXIDE 400 MG ORAL TABLET 241.3 MG
400 TABLET ORAL
Refills: 0 | Status: DISCONTINUED | OUTPATIENT
Start: 2022-11-23 | End: 2022-11-30

## 2022-11-23 RX ORDER — ACETAMINOPHEN 500 MG
650 TABLET ORAL EVERY 6 HOURS
Refills: 0 | Status: DISCONTINUED | OUTPATIENT
Start: 2022-11-23 | End: 2022-11-30

## 2022-11-23 RX ADMIN — Medication 50 MILLIGRAM(S): at 13:11

## 2022-11-23 RX ADMIN — MAGNESIUM OXIDE 400 MG ORAL TABLET 400 MILLIGRAM(S): 241.3 TABLET ORAL at 17:55

## 2022-11-23 RX ADMIN — QUETIAPINE FUMARATE 100 MILLIGRAM(S): 200 TABLET, FILM COATED ORAL at 17:55

## 2022-11-23 RX ADMIN — Medication 650 MILLIGRAM(S): at 18:25

## 2022-11-23 RX ADMIN — Medication 50 MILLIGRAM(S): at 21:06

## 2022-11-23 RX ADMIN — Medication 650 MILLIGRAM(S): at 17:55

## 2022-11-23 RX ADMIN — GABAPENTIN 300 MILLIGRAM(S): 400 CAPSULE ORAL at 13:11

## 2022-11-23 RX ADMIN — GABAPENTIN 300 MILLIGRAM(S): 400 CAPSULE ORAL at 21:06

## 2022-11-23 NOTE — ED ADULT NURSE NOTE - CHIEF COMPLAINT QUOTE
C/O generalized weakness, fatigue, body aches, cough and congestion. Was seen at [facility] yesterday and diagnosed with a viral infection. Reports he recently got over PNA. HX of HIV and Asthma.

## 2022-11-23 NOTE — ED CDU PROVIDER INITIAL DAY NOTE - OBJECTIVE STATEMENT
pt is a 33 y/o male with a pmhx of HIV last viral load undetectable presenting to the ed for evaluation. pt reports has been experiencing body aches cough congestion and weakness. pt states bilateral legs with cramping. pt was at [facility] recently was admitted neurology saw patient, pt recently stopped lyrica gapabentin but is taking medication again. pt denies cp sob palpitations abd pain nausea vomiting diarrhea back pain

## 2022-11-23 NOTE — ED CDU PROVIDER INITIAL DAY NOTE - ATTENDING APP SHARED VISIT CONTRIBUTION OF CARE
I agree with the PA's note and was available for any issues/concerns. I was directly involved in patient care. My brief overall assessment is as follows:    32 year old male PMHx HIV c/o body aches; initial work up and prior hospital records reviewed; will place in obs for housing arrangement

## 2022-11-23 NOTE — ED ADULT NURSE NOTE - OBJECTIVE STATEMENT
AOx4. Pt presenting to Emergency Department complaining of body aches, weakness and cough. Pt states " I am just getting over pneumonia". Denies chest pain, SOB, n/v/d, headaches, dizziness, fever, chills and abd pain. PMHx HIV and Asthma. Respirations even and unlabored. Skin warm to touch. pt educated on plan of care, pt able to successfully teach back plan of care to RN, RN will continue to reeducate pt during hospital stay.

## 2022-11-24 VITALS
RESPIRATION RATE: 16 BRPM | DIASTOLIC BLOOD PRESSURE: 74 MMHG | OXYGEN SATURATION: 98 % | HEART RATE: 80 BPM | SYSTOLIC BLOOD PRESSURE: 123 MMHG | TEMPERATURE: 98 F

## 2022-11-24 PROCEDURE — 80053 COMPREHEN METABOLIC PANEL: CPT

## 2022-11-24 PROCEDURE — 36415 COLL VENOUS BLD VENIPUNCTURE: CPT

## 2022-11-24 PROCEDURE — 99283 EMERGENCY DEPT VISIT LOW MDM: CPT | Mod: 25

## 2022-11-24 PROCEDURE — 71046 X-RAY EXAM CHEST 2 VIEWS: CPT

## 2022-11-24 PROCEDURE — 84132 ASSAY OF SERUM POTASSIUM: CPT

## 2022-11-24 PROCEDURE — 85025 COMPLETE CBC W/AUTO DIFF WBC: CPT

## 2022-11-24 PROCEDURE — 82550 ASSAY OF CK (CPK): CPT

## 2022-11-24 PROCEDURE — 87637 SARSCOV2&INF A&B&RSV AMP PRB: CPT

## 2022-11-24 PROCEDURE — 82553 CREATINE MB FRACTION: CPT

## 2022-11-24 PROCEDURE — 99217: CPT

## 2022-11-24 PROCEDURE — G0378: CPT

## 2022-11-24 RX ADMIN — GABAPENTIN 300 MILLIGRAM(S): 400 CAPSULE ORAL at 05:45

## 2022-11-24 RX ADMIN — Medication 50 MILLIGRAM(S): at 05:45

## 2022-11-24 RX ADMIN — MAGNESIUM OXIDE 400 MG ORAL TABLET 400 MILLIGRAM(S): 241.3 TABLET ORAL at 09:02

## 2022-11-24 RX ADMIN — Medication 650 MILLIGRAM(S): at 09:50

## 2022-11-24 RX ADMIN — Medication 650 MILLIGRAM(S): at 09:05

## 2022-11-24 RX ADMIN — QUETIAPINE FUMARATE 100 MILLIGRAM(S): 200 TABLET, FILM COATED ORAL at 05:45

## 2022-11-24 NOTE — ED ADULT NURSE REASSESSMENT NOTE - SYMPTOMS
5 month appointment with labs scheduled and mailed to patient.  
none
none/weakness

## 2022-11-24 NOTE — ED CDU PROVIDER DISPOSITION NOTE - ATTENDING CONTRIBUTION TO CARE
I personally saw the patient with the PA, and completed the key components of the history and physical exam. I then discussed the management plan with the PA.    housing found for patient, patient with no medical complaints, stable for dc

## 2022-11-24 NOTE — ED CDU PROVIDER DISPOSITION NOTE - NSFOLLOWUPINSTRUCTIONS_ED_ALL_ED_FT
- Please follow up with your Primary Care Doctor in 24-48 hr.  - Seek immediate medical care for any new, worsening or concerning signs or symptoms.   - Take medications as directed, be sure to read all instructions on packaging  - You were given copies of all your test results, please bring to your primary care doctor for review of any abnormal test results  - Follow up with  listed above, in 1 week    Feel better!    - Follow up with your doctor within 2-3 days.   - Take Tylenol (Acetaminophen) 650mg or Motrin (Ibuprofen/Advil) 600mg every 6 hours as needed for pain.   - Stay well hydrated.   - Stay at home until you receive test results.   - See attached COVID-19 instructions.   - Return to the ED for any new or worsening symptoms.     Viral Respiratory Infection    A viral respiratory infection is an illness that affects parts of the body used for breathing, like the lungs, nose, and throat. It is caused by a germ called a virus. Symptoms can include runny nose, coughing, sneezing, fatigue, body aches, sore throat, fever, or headache. Over the counter medicine can be used to manage the symptoms but the infection typically goes away on its own in 5 to 10 days.     SEEK IMMEDIATE MEDICAL CARE IF YOU HAVE ANY OF THE FOLLOWING SYMPTOMS: shortness of breath, chest pain, fever over 10 days, or lightheadedness/dizziness.

## 2022-11-24 NOTE — ED CDU PROVIDER DISPOSITION NOTE - PATIENT PORTAL LINK FT
You can access the FollowMyHealth Patient Portal offered by [facility] by registering at the following website: [email or web address]. By joining [facility]’s FollowMyHealth portal, you will also be able to view your health information using other applications (apps) compatible with our system.

## 2022-11-24 NOTE — ED CDU PROVIDER SUBSEQUENT DAY NOTE - ATTENDING APP SHARED VISIT CONTRIBUTION OF CARE
I personally saw the patient with the PA, and completed the key components of the history and physical exam. I then discussed the management plan with the PA.    pt COVID+ therefore removed current housing pending placement  no events overnight  VSS  resting comfortably watching tv

## 2022-11-24 NOTE — ED ADULT NURSE REASSESSMENT NOTE - COMFORT CARE
plan of care explained/repositioned
plan of care explained
meal provided/plan of care explained/po fluids offered/wait time explained
plan of care explained
meal provided/plan of care explained/warm blanket provided
plan of care explained/side rails up

## 2022-11-24 NOTE — CHART NOTE - NSCHARTNOTEFT_GEN_A_CORE
ROSALINO Note: ROSALINO spoke to Ursula at [facility], Ursula states pt recently reported staying w/ family in [place], will have to be placed through [facility]. ROSALINO placed call to [facility], spoke to Ms. Dykes. Ms. Dykes stating pt not in [facility] system, will have to go through [facility]. as a 'where found" case.    ROSALINO met w/ pt for further info. Pt stated he was at [facility] all day yesterday, went back and forth to get CONCHITA and other requested papers for placement, presented writer w/ paper from [facility] stating [place]. ROSALINO spoke to Ursula once more, Ursula stating she does not have that info on file. ROSALINO explained that pt has official documents stating this from [facility] dated from yesterday, offered to fax form to her as well. Ursula states she will call [facility] directly for confirmation.    ROSALINO received call back from Ursula, stating [facility] confirmed pt was meant to be placed there, however due to covid+ status cannot go there, will work on covid+ shelter. Ursula called back to say pt is going to be placed at [facility].     Shortly after ROSALINO received call back from Ursula stating that she received call back from [facility] stating they are booked for the weekend and she will work on alternative placement. Pt aware, also stated he his going to call his mom to see if she can assist, evening ROSALINO aware, SW following
ROSALINO Note: SW alerted pt is homeless and covid+, needs shelter placement. ROSALINO placed call to DSS, spoke to Sophie. Sophie took pt's info, stated  will start working on case and call writer when covid+ shelter has been obtained, SW to follow
SW Note: SW placed call to DSS to check in on status of referral for covid+ shelter, spoke to Estefany. Estefany reports referral still in queue to be processed, unable to provide timeline of when pt will be placed, stated "hopefully today". SW follow
SW Note: SW placed call to DSS first thing this AM, spoke to Belkis who reports she is working on finding pt alternative placement for covid+ shelter.     SW received call back from Belkis a while later stating she has secured placement for pt, [facility] to [phone]. Pt is to call DSS in AM to speak further about placement. SW arranged for transport via [facility], res#[phone], pt to be picked up by [facility]. SW met w/ pt to provide placement info and instructions, also provided pt w/ PPE pack and instructed pt to wear proper PPE for transport, pt verbalized understanding. RN and MD made aware housing has been secured for pt’s D/C, no other SW services identified at this time

## 2022-11-24 NOTE — ED ADULT NURSE REASSESSMENT NOTE - NS ED NURSE REASSESS COMMENT FT1
Pt resting comfortably in stretcher. Pt denies pain, SOB, n/v/d, headaches, dizziness, fevers, chills. Respirations even and unlabored. pt educated on plan of care, pt able to successfully teach back plan of care to RN, RN will continue to reeducate pt during hospital stay.
assumed care at change of shift, Pt in NAD, resting comfortably, awaiting placement at this time.
patient tolerated AM meds well. resting comfortably in a hospital bed. all questions answered. given ginger ale and cookies. awaiting recs.
Pt awake and alert x4, VSS afebrile. Pt resting comfortably at this time. Awaiting placement. Denies any difficulty breathing. Safety maintained. Will continue to monitor.
patient resting comfortably in NAD. awaiting SW for DSS placement in AM.
patient sleeping comfortably in NAD on a hospital bed. awaiting [facility] housing placement tomorrow.
patient moved to room A6R. placed on a hospital bed. commode and urinal at bedside as patient is +covid. all questions answered. call bell within reach. all safety precautions maintained
Assumed care of the patient @7730. Pt A&Ox4, VSS afebrile. Pt awaiting discharge to shelter. Denies any c/o pain or discomfort. Patient in understanding of plan of care. Patient with no further questions for the RN. Resting in comfort. Call bell within reach and encouraged to use when assistance needed. Will continue to monitor.
Assumed care of the patient @1215. Pt A&Ox4, VSS afebrile. Pt awaiting placement. Pt c/o generalized body pain, medicated as ordered. Patient in understanding of plan of care. Patient with no further questions for the RN. Resting in comfort. Call bell within reach and encouraged to use when assistance needed. Will continue to monitor.
care assumed from ANDREA Bruno. patient states "I feel like I have a head cold". resps unlabored. on obs pending case management for DSS housing as patient is + covid. VSS. all questions answered. patient fed a sandwich and given water. bed in lowest position, safety maintained and call bell within reach.

## 2022-11-24 NOTE — ED CDU PROVIDER DISPOSITION NOTE - CLINICAL COURSE
33 y/o male with a pmhx of HIV last viral load undetectable presenting to ED for placement issues. Pt came from [facility] housing, found to be covid +, now requiring placement accepting covid + patients. denies chest pain, sob, fever/chills, n/v/d.

## 2022-11-24 NOTE — ED ADULT NURSE REASSESSMENT NOTE - GENERAL PATIENT STATE
comfortable appearance/cooperative
comfortable appearance/resting/sleeping
resting/sleeping
comfortable appearance/cooperative
comfortable appearance/cooperative
comfortable appearance/cooperative/improvement verbalized
comfortable appearance/cooperative
comfortable appearance/cooperative

## 2022-11-28 DIAGNOSIS — G89.29 OTHER CHRONIC PAIN: ICD-10-CM

## 2022-11-28 DIAGNOSIS — E83.39 OTHER DISORDERS OF PHOSPHORUS METABOLISM: ICD-10-CM

## 2022-11-28 DIAGNOSIS — M54.50 LOW BACK PAIN, UNSPECIFIED: ICD-10-CM

## 2022-11-28 DIAGNOSIS — Z87.898 PERSONAL HISTORY OF OTHER SPECIFIED CONDITIONS: ICD-10-CM

## 2022-11-28 DIAGNOSIS — Z87.891 PERSONAL HISTORY OF NICOTINE DEPENDENCE: ICD-10-CM

## 2022-11-28 DIAGNOSIS — Z59.01 SHELTERED HOMELESSNESS: ICD-10-CM

## 2022-11-28 DIAGNOSIS — F51.4 SLEEP TERRORS [NIGHT TERRORS]: ICD-10-CM

## 2022-11-28 DIAGNOSIS — Z21 ASYMPTOMATIC HUMAN IMMUNODEFICIENCY VIRUS [HIV] INFECTION STATUS: ICD-10-CM

## 2022-11-28 DIAGNOSIS — F43.10 POST-TRAUMATIC STRESS DISORDER, UNSPECIFIED: ICD-10-CM

## 2022-11-28 DIAGNOSIS — Z88.1 ALLERGY STATUS TO OTHER ANTIBIOTIC AGENTS STATUS: ICD-10-CM

## 2022-11-28 DIAGNOSIS — G62.9 POLYNEUROPATHY, UNSPECIFIED: ICD-10-CM

## 2022-11-28 DIAGNOSIS — J45.909 UNSPECIFIED ASTHMA, UNCOMPLICATED: ICD-10-CM

## 2022-11-28 DIAGNOSIS — J20.9 ACUTE BRONCHITIS, UNSPECIFIED: ICD-10-CM

## 2022-11-28 DIAGNOSIS — E83.42 HYPOMAGNESEMIA: ICD-10-CM

## 2022-11-28 DIAGNOSIS — Z88.8 ALLERGY STATUS TO OTHER DRUGS, MEDICAMENTS AND BIOLOGICAL SUBSTANCES: ICD-10-CM

## 2022-11-28 DIAGNOSIS — F39 UNSPECIFIED MOOD [AFFECTIVE] DISORDER: ICD-10-CM

## 2022-11-28 SDOH — ECONOMIC STABILITY - HOUSING INSECURITY: SHELTERED HOMELESSNESS: Z59.01

## 2022-12-01 NOTE — ED ADULT NURSE NOTE - BREATHING, MLM
Itraconazole Counseling:  I discussed with the patient the risks of itraconazole including but not limited to liver damage, nausea/vomiting, neuropathy, and severe allergy.  The patient understands that this medication is best absorbed when taken with acidic beverages such as non-diet cola or ginger ale.  The patient understands that monitoring is required including baseline LFTs and repeat LFTs at intervals.  The patient understands that they are to contact us or the primary physician if concerning signs are noted. Spontaneous, unlabored and symmetrical

## 2022-12-02 NOTE — H&P ADULT - HISTORY OF PRESENT ILLNESS
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 There was concern for HIV myelopathy vs cord compression, but EMG, MR thoracic and lumbar were negative. He has MRI lumbar spines (6/5/21, 5/21/21, 5/2/21, 10/26/20). MRI cervical spine (5/2/21), MRI thoracic spine (5/2/21, 10/26/20), MRI head (3/25/20) which were all negative. He also had a normal LP as well. He is interested in rehab here. States he is clautrophobic and needs anethesia for MRIs. Is still able to walk with muscular pain though. Denies fevers, chills, nausea, vomiting, diarrhea, sob, headaches, visual changes, LE edema, polyuria, melena, hematochezia, cp, palpations, LOC.

## 2022-12-02 NOTE — OCCUPATIONAL THERAPY INITIAL EVALUATION ADULT - LEVEL OF INDEPENDENCE: GROOMING, OT EVAL
moderate assist (50% patients effort)/minimum assist (75% patients effort)
Yes
moderate assist (50% patients effort)

## 2022-12-07 ENCOUNTER — INPATIENT (INPATIENT)
Facility: HOSPITAL | Age: 32
LOS: 9 days | Discharge: LEFT BEFORE DISCHARGE | End: 2022-12-17
Attending: INTERNAL MEDICINE | Admitting: INTERNAL MEDICINE
Payer: MEDICAID

## 2022-12-07 VITALS
OXYGEN SATURATION: 100 % | SYSTOLIC BLOOD PRESSURE: 130 MMHG | HEART RATE: 72 BPM | RESPIRATION RATE: 15 BRPM | HEIGHT: 72 IN | DIASTOLIC BLOOD PRESSURE: 71 MMHG | TEMPERATURE: 98 F

## 2022-12-07 DIAGNOSIS — Z29.9 ENCOUNTER FOR PROPHYLACTIC MEASURES, UNSPECIFIED: ICD-10-CM

## 2022-12-07 DIAGNOSIS — B20 HUMAN IMMUNODEFICIENCY VIRUS [HIV] DISEASE: ICD-10-CM

## 2022-12-07 DIAGNOSIS — M54.9 DORSALGIA, UNSPECIFIED: ICD-10-CM

## 2022-12-07 DIAGNOSIS — Z98.890 OTHER SPECIFIED POSTPROCEDURAL STATES: Chronic | ICD-10-CM

## 2022-12-07 DIAGNOSIS — R29.898 OTHER SYMPTOMS AND SIGNS INVOLVING THE MUSCULOSKELETAL SYSTEM: ICD-10-CM

## 2022-12-07 LAB
ALBUMIN SERPL ELPH-MCNC: 4 G/DL — SIGNIFICANT CHANGE UP (ref 3.3–5)
ALP SERPL-CCNC: 76 U/L — SIGNIFICANT CHANGE UP (ref 40–120)
ALT FLD-CCNC: 18 U/L — SIGNIFICANT CHANGE UP (ref 4–41)
AMPHET UR-MCNC: NEGATIVE — SIGNIFICANT CHANGE UP
ANION GAP SERPL CALC-SCNC: 11 MMOL/L — SIGNIFICANT CHANGE UP (ref 7–14)
APPEARANCE UR: CLEAR — SIGNIFICANT CHANGE UP
AST SERPL-CCNC: 42 U/L — HIGH (ref 4–40)
B PERT DNA SPEC QL NAA+PROBE: SIGNIFICANT CHANGE UP
B PERT+PARAPERT DNA PNL SPEC NAA+PROBE: SIGNIFICANT CHANGE UP
BACTERIA # UR AUTO: NEGATIVE — SIGNIFICANT CHANGE UP
BARBITURATES UR SCN-MCNC: NEGATIVE — SIGNIFICANT CHANGE UP
BASOPHILS # BLD AUTO: 0 K/UL — SIGNIFICANT CHANGE UP (ref 0–0.2)
BASOPHILS NFR BLD AUTO: 0 % — SIGNIFICANT CHANGE UP (ref 0–2)
BENZODIAZ UR-MCNC: NEGATIVE — SIGNIFICANT CHANGE UP
BILIRUB SERPL-MCNC: 0.3 MG/DL — SIGNIFICANT CHANGE UP (ref 0.2–1.2)
BILIRUB UR-MCNC: NEGATIVE — SIGNIFICANT CHANGE UP
BORDETELLA PARAPERTUSSIS (RAPRVP): SIGNIFICANT CHANGE UP
BUN SERPL-MCNC: 18 MG/DL — SIGNIFICANT CHANGE UP (ref 7–23)
C PNEUM DNA SPEC QL NAA+PROBE: SIGNIFICANT CHANGE UP
CALCIUM SERPL-MCNC: 8.5 MG/DL — SIGNIFICANT CHANGE UP (ref 8.4–10.5)
CHLORIDE SERPL-SCNC: 100 MMOL/L — SIGNIFICANT CHANGE UP (ref 98–107)
CO2 SERPL-SCNC: 22 MMOL/L — SIGNIFICANT CHANGE UP (ref 22–31)
COCAINE METAB.OTHER UR-MCNC: POSITIVE
COLOR SPEC: SIGNIFICANT CHANGE UP
CREAT SERPL-MCNC: 0.7 MG/DL — SIGNIFICANT CHANGE UP (ref 0.5–1.3)
CREATININE URINE RESULT, DAU: 52 MG/DL — SIGNIFICANT CHANGE UP
DIFF PNL FLD: NEGATIVE — SIGNIFICANT CHANGE UP
EGFR: 125 ML/MIN/1.73M2 — SIGNIFICANT CHANGE UP
EOSINOPHIL # BLD AUTO: 0.05 K/UL — SIGNIFICANT CHANGE UP (ref 0–0.5)
EOSINOPHIL NFR BLD AUTO: 1.9 % — SIGNIFICANT CHANGE UP (ref 0–6)
FLUAV SUBTYP SPEC NAA+PROBE: SIGNIFICANT CHANGE UP
FLUBV RNA SPEC QL NAA+PROBE: SIGNIFICANT CHANGE UP
GLUCOSE SERPL-MCNC: 86 MG/DL — SIGNIFICANT CHANGE UP (ref 70–99)
GLUCOSE UR QL: NEGATIVE — SIGNIFICANT CHANGE UP
HADV DNA SPEC QL NAA+PROBE: SIGNIFICANT CHANGE UP
HCOV 229E RNA SPEC QL NAA+PROBE: SIGNIFICANT CHANGE UP
HCOV HKU1 RNA SPEC QL NAA+PROBE: SIGNIFICANT CHANGE UP
HCOV NL63 RNA SPEC QL NAA+PROBE: SIGNIFICANT CHANGE UP
HCOV OC43 RNA SPEC QL NAA+PROBE: SIGNIFICANT CHANGE UP
HCT VFR BLD CALC: 39.4 % — SIGNIFICANT CHANGE UP (ref 39–50)
HGB BLD-MCNC: 12.4 G/DL — LOW (ref 13–17)
HMPV RNA SPEC QL NAA+PROBE: SIGNIFICANT CHANGE UP
HPIV1 RNA SPEC QL NAA+PROBE: SIGNIFICANT CHANGE UP
HPIV2 RNA SPEC QL NAA+PROBE: SIGNIFICANT CHANGE UP
HPIV3 RNA SPEC QL NAA+PROBE: SIGNIFICANT CHANGE UP
HPIV4 RNA SPEC QL NAA+PROBE: SIGNIFICANT CHANGE UP
IANC: 0.6 K/UL — LOW (ref 1.8–7.4)
KETONES UR-MCNC: NEGATIVE — SIGNIFICANT CHANGE UP
LEUKOCYTE ESTERASE UR-ACNC: NEGATIVE — SIGNIFICANT CHANGE UP
LYMPHOCYTES # BLD AUTO: 1.49 K/UL — SIGNIFICANT CHANGE UP (ref 1–3.3)
LYMPHOCYTES # BLD AUTO: 56.2 % — HIGH (ref 13–44)
M PNEUMO DNA SPEC QL NAA+PROBE: SIGNIFICANT CHANGE UP
MCHC RBC-ENTMCNC: 28.9 PG — SIGNIFICANT CHANGE UP (ref 27–34)
MCHC RBC-ENTMCNC: 31.5 GM/DL — LOW (ref 32–36)
MCV RBC AUTO: 91.8 FL — SIGNIFICANT CHANGE UP (ref 80–100)
METHADONE UR-MCNC: NEGATIVE — SIGNIFICANT CHANGE UP
MONOCYTES # BLD AUTO: 0.43 K/UL — SIGNIFICANT CHANGE UP (ref 0–0.9)
MONOCYTES NFR BLD AUTO: 16.2 % — HIGH (ref 2–14)
NEUTROPHILS # BLD AUTO: 0.63 K/UL — LOW (ref 1.8–7.4)
NEUTROPHILS NFR BLD AUTO: 23.8 % — LOW (ref 43–77)
NITRITE UR-MCNC: NEGATIVE — SIGNIFICANT CHANGE UP
OPIATES UR-MCNC: NEGATIVE — SIGNIFICANT CHANGE UP
OXYCODONE UR-MCNC: NEGATIVE — SIGNIFICANT CHANGE UP
PCP SPEC-MCNC: SIGNIFICANT CHANGE UP
PCP UR-MCNC: NEGATIVE — SIGNIFICANT CHANGE UP
PH UR: 6.5 — SIGNIFICANT CHANGE UP (ref 5–8)
PLATELET # BLD AUTO: 145 K/UL — LOW (ref 150–400)
POTASSIUM SERPL-MCNC: 4.2 MMOL/L — SIGNIFICANT CHANGE UP (ref 3.5–5.3)
POTASSIUM SERPL-SCNC: 4.2 MMOL/L — SIGNIFICANT CHANGE UP (ref 3.5–5.3)
PROT SERPL-MCNC: 7.8 G/DL — SIGNIFICANT CHANGE UP (ref 6–8.3)
PROT UR-MCNC: NEGATIVE — SIGNIFICANT CHANGE UP
RAPID RVP RESULT: SIGNIFICANT CHANGE UP
RBC # BLD: 4.29 M/UL — SIGNIFICANT CHANGE UP (ref 4.2–5.8)
RBC # FLD: 13.7 % — SIGNIFICANT CHANGE UP (ref 10.3–14.5)
RBC CASTS # UR COMP ASSIST: SIGNIFICANT CHANGE UP /HPF (ref 0–4)
RSV RNA SPEC QL NAA+PROBE: SIGNIFICANT CHANGE UP
RV+EV RNA SPEC QL NAA+PROBE: SIGNIFICANT CHANGE UP
SARS-COV-2 RNA SPEC QL NAA+PROBE: SIGNIFICANT CHANGE UP
SODIUM SERPL-SCNC: 133 MMOL/L — LOW (ref 135–145)
SP GR SPEC: 1.01 — LOW (ref 1.01–1.05)
THC UR QL: POSITIVE
UROBILINOGEN FLD QL: SIGNIFICANT CHANGE UP
WBC # BLD: 2.66 K/UL — LOW (ref 3.8–10.5)
WBC # FLD AUTO: 2.66 K/UL — LOW (ref 3.8–10.5)
WBC UR QL: SIGNIFICANT CHANGE UP /HPF (ref 0–5)

## 2022-12-07 PROCEDURE — 99285 EMERGENCY DEPT VISIT HI MDM: CPT

## 2022-12-07 PROCEDURE — 99223 1ST HOSP IP/OBS HIGH 75: CPT

## 2022-12-07 RX ORDER — ACETAMINOPHEN 500 MG
650 TABLET ORAL EVERY 6 HOURS
Refills: 0 | Status: DISCONTINUED | OUTPATIENT
Start: 2022-12-07 | End: 2022-12-17

## 2022-12-07 RX ORDER — MAGNESIUM OXIDE 400 MG ORAL TABLET 241.3 MG
400 TABLET ORAL
Refills: 0 | Status: DISCONTINUED | OUTPATIENT
Start: 2022-12-07 | End: 2022-12-08

## 2022-12-07 RX ORDER — MORPHINE SULFATE 50 MG/1
2 CAPSULE, EXTENDED RELEASE ORAL ONCE
Refills: 0 | Status: DISCONTINUED | OUTPATIENT
Start: 2022-12-07 | End: 2022-12-07

## 2022-12-07 RX ORDER — BICTEGRAVIR SODIUM, EMTRICITABINE, AND TENOFOVIR ALAFENAMIDE FUMARATE 30; 120; 15 MG/1; MG/1; MG/1
1 TABLET ORAL DAILY
Refills: 0 | Status: DISCONTINUED | OUTPATIENT
Start: 2022-12-07 | End: 2022-12-17

## 2022-12-07 RX ORDER — LANOLIN ALCOHOL/MO/W.PET/CERES
3 CREAM (GRAM) TOPICAL AT BEDTIME
Refills: 0 | Status: DISCONTINUED | OUTPATIENT
Start: 2022-12-07 | End: 2022-12-17

## 2022-12-07 RX ORDER — QUETIAPINE FUMARATE 200 MG/1
100 TABLET, FILM COATED ORAL DAILY
Refills: 0 | Status: DISCONTINUED | OUTPATIENT
Start: 2022-12-07 | End: 2022-12-17

## 2022-12-07 RX ORDER — PREGABALIN 225 MG/1
1000 CAPSULE ORAL DAILY
Refills: 0 | Status: DISCONTINUED | OUTPATIENT
Start: 2022-12-07 | End: 2022-12-17

## 2022-12-07 RX ORDER — BACLOFEN 100 %
5 POWDER (GRAM) MISCELLANEOUS EVERY 8 HOURS
Refills: 0 | Status: DISCONTINUED | OUTPATIENT
Start: 2022-12-07 | End: 2022-12-15

## 2022-12-07 RX ORDER — NICOTINE POLACRILEX 2 MG
1 GUM BUCCAL DAILY
Refills: 0 | Status: DISCONTINUED | OUTPATIENT
Start: 2022-12-07 | End: 2022-12-17

## 2022-12-07 RX ORDER — FLUTICASONE PROPIONATE 50 MCG
1 SPRAY, SUSPENSION NASAL
Refills: 0 | Status: DISCONTINUED | OUTPATIENT
Start: 2022-12-07 | End: 2022-12-17

## 2022-12-07 RX ORDER — QUETIAPINE FUMARATE 200 MG/1
300 TABLET, FILM COATED ORAL AT BEDTIME
Refills: 0 | Status: DISCONTINUED | OUTPATIENT
Start: 2022-12-07 | End: 2022-12-17

## 2022-12-07 RX ORDER — INFLUENZA VIRUS VACCINE 15; 15; 15; 15 UG/.5ML; UG/.5ML; UG/.5ML; UG/.5ML
0.5 SUSPENSION INTRAMUSCULAR ONCE
Refills: 0 | Status: DISCONTINUED | OUTPATIENT
Start: 2022-12-07 | End: 2022-12-17

## 2022-12-07 RX ADMIN — Medication 1 SPRAY(S): at 17:07

## 2022-12-07 RX ADMIN — QUETIAPINE FUMARATE 100 MILLIGRAM(S): 200 TABLET, FILM COATED ORAL at 17:06

## 2022-12-07 RX ADMIN — BICTEGRAVIR SODIUM, EMTRICITABINE, AND TENOFOVIR ALAFENAMIDE FUMARATE 1 TABLET(S): 30; 120; 15 TABLET ORAL at 17:05

## 2022-12-07 RX ADMIN — Medication 50 MILLIGRAM(S): at 21:15

## 2022-12-07 RX ADMIN — Medication 650 MILLIGRAM(S): at 18:06

## 2022-12-07 RX ADMIN — PREGABALIN 1000 MICROGRAM(S): 225 CAPSULE ORAL at 17:07

## 2022-12-07 RX ADMIN — MORPHINE SULFATE 2 MILLIGRAM(S): 50 CAPSULE, EXTENDED RELEASE ORAL at 08:57

## 2022-12-07 RX ADMIN — MAGNESIUM OXIDE 400 MG ORAL TABLET 400 MILLIGRAM(S): 241.3 TABLET ORAL at 17:07

## 2022-12-07 RX ADMIN — Medication 650 MILLIGRAM(S): at 17:06

## 2022-12-07 NOTE — H&P ADULT - ENDOCRINE
[Follow Up] : follow up GYN visit [FreeTextEntry1] : LMP 1week ago after 53 days, usually q month. UCG- x 3. Uses condoms.\par When she was suppposed to get menses she felt pain on right. Had pain on right 3 days ago and on and off since.\par No abn dc. Dull pain, lasts 10 min, no other symptoms. Pain always on right side. no new sexual partners since last visit. No n, v, fevers. details…

## 2022-12-07 NOTE — H&P ADULT - NSHPLABSRESULTS_GEN_ALL_CORE
12-07    133<L>  |  100  |  18  ----------------------------<  86  4.2   |  22  |  0.70    Ca    8.5      07 Dec 2022 08:00    TPro  7.8  /  Alb  4.0  /  TBili  0.3  /  DBili  x   /  AST  42<H>  /  ALT  18  /  AlkPhos  76  12-07                            12.4   2.66  )-----------( 145      ( 07 Dec 2022 08:00 )             39.4

## 2022-12-07 NOTE — ED PROVIDER NOTE - NSICDXPASTMEDICALHX_GEN_ALL_CORE_FT
PAST MEDICAL HISTORY:  Asthma     Chronic sinusitis     Closed fracture of multiple ribs of right side, initial encounter     Cocaine abuse     GBS (Guillain [place] syndrome)     HIV (human immunodeficiency virus infection) from birth    Homeless

## 2022-12-07 NOTE — ED PROVIDER NOTE - CONSTITUTIONAL, MLM
Ill appearing, awake, alert, oriented to person, place, time/situation and in moderate distress. normal... - - -

## 2022-12-07 NOTE — H&P ADULT - ASSESSMENT
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

## 2022-12-07 NOTE — H&P ADULT - NS ATTEND AMEND GEN_ALL_CORE FT
Pt is a 33 yo M with PMH congenital HIV (c/b myelopathy), chronic back pain, PSA (cocaine, THC), asthma, and current tobacco use p/w L>R LE weakness, back pain, myalgias, sore throat, cough, urinary incontinence, and saddle anesthesia. Has had many admissions for similar complaints in the past with extensive neuro w/u and 3+ MRIs of the spine / brain in the last few months. In the past has been recommended for SNF as pt is undomiciled and has difficulty obtaining meds and f/u with doctors regularly, but has declined d/t his age. Now amenable to SNF / LTCF. Hemodynamically stable on arrival. Labs at baseline. Exam notable for mm strength testing BL UE 5/5, LLE 3+/5, RLE 4+/5. 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 Plan for daily B12 supplementation, PT/OT consult, and SW involvement. Will send labs requested by neuro. Start NRT for tobacco use d/o while inpt. Will send UA/UCx and UDS. Rest as outlined above.

## 2022-12-07 NOTE — ED PROVIDER NOTE - PLAN OF CARE
The patient is a 32y Homeless Male who has a past medical and surgery history of congenitally acquired HIV Asthma Closed Rt  multiple rib fractures and LUE surgery, Cocaine abuse Chronic sinusitis PTED with C/O lower extremity weakness x1 day. No complaints of chest pain, headache, nausea, dizziness, vomiting  SOB, fever, chills verbalized. Pmhx HIV Guillain-Barré syndrome    Vital Signs Last 24 Hrs  T(F): 98 HR: 72 BP: 130/71 RR: 15 SpO2: 100% (07 Dec 2022 06:09)   PE: as described; my additions and exceptions are noted in the chart    DATA:  EKG: pending at time of evaluation  LAB: Pending at time of evaluation    IMPRESSION/RISK:  Dx= LE weakness     Consideration include lack of rectal tone LE weakness necessitates MRI/lumbar/thoracic   Plan  as above  rectal temp for fever   Neurology consult   MRI T L spine after NIF from respiratory   preop labs/cultures BSABX if febrile   reasess   dispo as per results and response

## 2022-12-07 NOTE — PATIENT PROFILE ADULT - FUNCTIONAL ASSESSMENT - DAILY ACTIVITY SCORE.
[facility] Emergency Department  [address]  [facility] [address]  Phone:  [phone]  Fax:  [phone]                                    Amy Clemens   MRN: [id number]    Department:  [facility] Emergency Department   Date of Visit:  12/23/2018           After Visit Summary Signature Page    I have received my discharge instructions, and my questions have been answered. I have discussed any challenges I see with this plan with the nurse or doctor.    ..........................................................................................................................................  Patient/Patient Representative Signature      ..........................................................................................................................................  Patient Representative Print Name and Relationship to Patient    ..................................................               ................................................  Date                                   Time    ..........................................................................................................................................  Reviewed by Signature/Title    ...................................................              ..............................................  Date                                               Time          22EPIC Rev 08/18       
24

## 2022-12-07 NOTE — H&P ADULT - PROBLEM SELECTOR PLAN 2
- Continue HAART  - CHeck viral load and T-Cell counts  - Compliance continued to be reinforced with patient.    #Recent COVID Infection - per infection control guidelines should maintain isolation for ~21 days following infection (initial positive on 11/22) - Currently testing negative

## 2022-12-07 NOTE — PATIENT PROFILE ADULT - FALL HARM RISK - HARM RISK INTERVENTIONS
Assistance with ambulation/Assistance OOB with selected safe patient handling equipment/Communicate Risk of Fall with Harm to all staff/Discuss with provider need for PT consult/Monitor gait and stability/Reinforce activity limits and safety measures with patient and family/Tailored Fall Risk Interventions/Visual Cue: Yellow wristband and red socks/Bed in lowest position, wheels locked, appropriate side rails in place/Call bell, personal items and telephone in reach/Instruct patient to call for assistance before getting out of bed or chair/Non-slip footwear when patient is out of bed/[place] to call system/Physically safe environment - no spills, clutter or unnecessary equipment/Purposeful Proactive Rounding/Room/bathroom lighting operational, light cord in reach

## 2022-12-07 NOTE — H&P ADULT - NEUROLOGICAL
details… responds to pain/responds to verbal commands/no spontaneous movement responds to verbal commands/no spontaneous movement

## 2022-12-07 NOTE — ED ADULT NURSE REASSESSMENT NOTE - NS ED NURSE REASSESS COMMENT FT1
report received from overnight RN. A&Ox4. ambulatory. NAD. pt denies SOB, chest pain, dizziness, weakness, urinary symptoms, HA, n/v/d, fevers, chills. respirations are even and un labored. safety precautions maintained. USIV placed by MD to left bicep. comfort care provided. meal provided.

## 2022-12-07 NOTE — ED PROVIDER NOTE - CARE PLAN
Assessment and plan of treatment:	The patient is a 32y Homeless Male who has a past medical and surgery history of congenitally acquired HIV Asthma Closed Rt  multiple rib fractures and LUE surgery, Cocaine abuse Chronic sinusitis PTED with C/O lower extremity weakness x1 day. No complaints of chest pain, headache, nausea, dizziness, vomiting  SOB, fever, chills verbalized. Pmhx HIV Guillain-Barré syndrome    Vital Signs Last 24 Hrs  T(F): 98 HR: 72 BP: 130/71 RR: 15 SpO2: 100% (07 Dec 2022 06:09)   PE: as described; my additions and exceptions are noted in the chart    DATA:  EKG: pending at time of evaluation  LAB: Pending at time of evaluation    IMPRESSION/RISK:  Dx= LE weakness     Consideration include lack of rectal tone LE weakness necessitates MRI/lumbar/thoracic   Plan  as above  rectal temp for fever   Neurology consult   MRI T L spine after NIF from respiratory   preop labs/cultures BSABX if febrile   reasess   dispo as per results and response   Principal Discharge DX:	Back pain  Assessment and plan of treatment:	The patient is a 32y Homeless Male who has a past medical and surgery history of congenitally acquired HIV Asthma Closed Rt  multiple rib fractures and LUE surgery, Cocaine abuse Chronic sinusitis PTED with C/O lower extremity weakness x1 day. No complaints of chest pain, headache, nausea, dizziness, vomiting  SOB, fever, chills verbalized. Pmhx HIV Guillain-Barré syndrome    Vital Signs Last 24 Hrs  T(F): 98 HR: 72 BP: 130/71 RR: 15 SpO2: 100% (07 Dec 2022 06:09)   PE: as described; my additions and exceptions are noted in the chart    DATA:  EKG: pending at time of evaluation  LAB: Pending at time of evaluation    IMPRESSION/RISK:  Dx= LE weakness     Consideration include lack of rectal tone LE weakness necessitates MRI/lumbar/thoracic   Plan  as above  rectal temp for fever   Neurology consult   MRI T L spine after NIF from respiratory   preop labs/cultures BSABX if febrile   reasess   dispo as per results and response  Secondary Diagnosis:	Lower extremity weakness  Secondary Diagnosis:	HIV disease   1

## 2022-12-07 NOTE — H&P ADULT - NEGATIVE NEUROLOGICAL SYMPTOMS
no transient paralysis/no paresthesias/no generalized seizures/no syncope/no vertigo/no loss of consciousness

## 2022-12-07 NOTE — ED PROVIDER NOTE - OBJECTIVE STATEMENT
31 y/o M w/ PMH of congenital HIV, GBS presents complaining of back pain, BLE weakness and incontinence for 3 days. Pt states he noticed the back pain on Sunday, it came on suddenly, and he denies trauma. He states he woke up the past few days and noticed he has urinated during his sleep, and also has accidently urinated himself during the day. Pt also endorses saddle anesthesia. Denies f, ch, chest pain, sob, n, v, HA, dizziness. 31 y/o M w/ PMH of congenital HIV, GBS presents complaining of lower back pain, kellee LE weakness and urinary incontinence for 3 days. Pt states he noticed the back pain on Sunday, it came on suddenly, and he denies trauma. He states he woke up the past few days and noticed he has urinated during his sleep, and also has accidently urinated himself during the day. Pt also endorses saddle anesthesia. States sx are different from time of GBS diagnoses. Denies f, ch, chest pain, sob, n, v, HA, dizziness.

## 2022-12-07 NOTE — H&P ADULT - PROBLEM SELECTOR PLAN 3
#Lovenox for DVT prevention    #Social Disposition issue: patient received from shelter, previously offered SNF but declined; at this time is amenable to placement in SNF as he is unable to take care of himself.

## 2022-12-07 NOTE — ED ADULT NURSE NOTE - NSIMPLEMENTINTERV_GEN_ALL_ED
Implemented All Universal Safety Interventions:  [place] to call system. Call bell, personal items and telephone within reach. Instruct patient to call for assistance. Room bathroom lighting operational. Non-slip footwear when patient is off stretcher. Physically safe environment: no spills, clutter or unnecessary equipment. Stretcher in lowest position, wheels locked, appropriate side rails in place.

## 2022-12-07 NOTE — ED PROVIDER NOTE - CLINICAL SUMMARY MEDICAL DECISION MAKING FREE TEXT BOX
33 y/o M w/ PMH congenital HIV, GBS presents complaining of back pain, BLE weakness, incontinence and saddle anesthesia.  Concern for spinal cord compression  Will give pain control, and obtain MRI, labs.

## 2022-12-07 NOTE — H&P ADULT - NSICDXPASTMEDICALHX_GEN_ALL_CORE_FT
PAST MEDICAL HISTORY:  Asthma     Chronic sinusitis     Closed fracture of multiple ribs of right side, initial encounter     Cocaine abuse     GBS (Guillain [place] syndrome)     HIV (human immunodeficiency virus infection) from birth    Homeless

## 2022-12-07 NOTE — PATIENT PROFILE ADULT - WILL THE PATIENT ACCEPT THE PFIZER COVID-19 VACCINE IF ELIGIBLE AND IT IS AVAILABLE?
I will START or STAY ON the medications listed below when I get home from the hospital:    ibuprofen 600 mg oral tablet  -- 1 tab(s) by mouth every 6 hours  -- Indication: For pain    ferrous sulfate 325 mg (65 mg elemental iron) oral tablet  -- 1 tab(s) by mouth 3 times a day  -- Indication: For anemia     docusate sodium 100 mg oral capsule  -- 1 cap(s) by mouth 2 times a day, As needed, For stool softening  -- Indication: For stool softner No

## 2022-12-07 NOTE — CONSULT NOTE ADULT - SUBJECTIVE AND OBJECTIVE BOX
Neurology Consultation     HPI:   Patient known to myself and neurology service    Patient LS is a 33 yo M w/ PMHx congenital HIV, chronic back pain, substance use, HIV myelopathy, asthma, multiple prior ED visits/ admissions for fluctuating/ worsening LE weakness and urinary retention, presents again for back pain, LE weakness (L worse than R), saddle anesthesia.     Of note, has been seen by neurology multiple times in the past, 11/19/22, 9/13/22, by myself in 8/9/22 frequently for similar symptoms (ie several days of worsening LE weakness of toes to hips, bowel/bladder incontinence, lower back pain, muscle spasms in lumbar back). Takes baclofen, gabapentin, lyrica. Was also seen afterwards in 9/13/22 for fluctuating LE weakness and couldnt get out of bed. Suspected then for HIV myelopathy. EMG LE w/ Dr Eber carbajal per notes. Was also seen in 1/2022 and 9/2021 for similar complaints. Most evaluations had suspicion for b/l LE weakness due to myelopathy 2/2 HIV infection and NOT GBS. Patient has had hx of normal LP and normal spine MRI's. Prior hx of GBS noted to be incorrect (refer to consult notes from 1/2022 and 9/2021). However, prior notes suggest ID did not suspect HIV myelopathy given it is considered end stage disease of HIV and has had good range for CD4 count and VL. Patient has had multiple MRI lumbar spines (6/5/21, 5/21/21, 5/2/21, 10/26/20). MRI cervical spine (5/2/21), MRI thoracic spine (5/2/21, 10/26/20), MRI head (3/25/20). Most recent MRI of thoracic and lumbar spine 9/2/22 showed  No cord or cauda equina compression. No abnormal cord signal or enhancement. HIE notes from other systems report EMG/NCS in 2021 as being normal. Also has had extensive workup done in [facility] and at other hospitals. Prior tox w/u has also revealed pos for cocaine and marijuana.    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    Asthma    Closed fracture of multiple ribs of right side, initial encounter    Cocaine abuse    Chronic sinusitis    Homeless    History of orthopedic surgery  left arm    FAMILY HISTORY:  FH: HIV infection (Mother)    MEDICATIONS (HOME):  Home Medications:    MEDICATIONS  (STANDING):  morphine  - Injectable 2 milliGRAM(s) IV Push Once    MEDICATIONS  (PRN):    ALLERGIES/INTOLERANCES:  Allergies  Ceclor (Unknown)  Toradol (Anaphylaxis; Swelling)  Toradol (Swelling)    Intolerances    VITALS & EXAMINATION:  Vital Signs Last 24 Hrs  T(C): 36.7 (07 Dec 2022 06:09), Max: 36.7 (07 Dec 2022 06:09)  T(F): 98 (07 Dec 2022 06:09), Max: 98 (07 Dec 2022 06:09)  HR: 72 (07 Dec 2022 06:09) (72 - 72)  BP: 130/71 (07 Dec 2022 06:09) (130/71 - 130/71)  BP(mean): --  RR: 15 (07 Dec 2022 06:09) (15 - 15)  SpO2: 100% (07 Dec 2022 06:09) (100% - 100%)    Parameters below as of 07 Dec 2022 06:38  Patient On (Oxygen Delivery Method): room air       LABORATORY:  CBC   Chem     LFTs   Coagulopathy   Lipid Panel   A1c   Cardiac enzymes     U/A   CSF  Other    STUDIES & IMAGING: (EEG, CT, MR, U/S, TTE/GARFIELD): Neurology Consultation     HPI:   Patient known to myself and neurology service    Patient LS is a 33 yo M w/ PMHx congenital HIV, chronic back pain, substance use, HIV myelopathy, asthma, multiple prior ED visits/ admissions for fluctuating/ worsening LE weakness and urinary retention, presents again for back pain, LE weakness (L worse than R), saddle anesthesia.     Of note, has been seen by neurology multiple times in the past, 11/19/22, 9/13/22, by myself in 8/9/22, 1/2022, 9/2021 frequently for similar symptoms (ie several days of worsening LE weakness of toes to hips, bowel/bladder incontinence, lower back pain, muscle spasms in lumbar back). Takes baclofen, gabapentin, lyrica. In 9/13/22 he was evaluated by neuro for fluctuating LE weakness and couldn't get out of bed. Most evaluations had suspicion for b/l LE weakness due to myelopathy 2/2 HIV infection and NOT GBS. Patient has had hx of normal LP and normal spine MRI's. Prior hx of GBS noted to be incorrect (refer to consult notes from 1/2022 and 9/2021). However, prior notes suggest ID did not suspect HIV myelopathy given it is considered end stage disease of HIV and has had good range for CD4 count and VL. Patient has had multiple MRI lumbar spines (6/5/21, 5/21/21, 5/2/21, 10/26/20). MRI cervical spine (5/2/21), MRI thoracic spine (5/2/21, 10/26/20), MRI head (3/25/20). Most recent MRI of thoracic and lumbar spine 9/2/22 showed  No cord or cauda equina compression. No abnormal cord signal or enhancement. HIE notes from other systems report EMG/NCS in 2021 as being normal. Also has had extensive workup done in [facility] and at other hospitals. Prior tox w/u has also revealed pos for cocaine and marijuana.    LP 5/2021:   VDRL neg, lyme neg, fungal neg, cltr neg, PCR neg  protein 45  glucose 48  No color  TNC 0, RBC 0     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    Asthma    Closed fracture of multiple ribs of right side, initial encounter    Cocaine abuse    Chronic sinusitis    Homeless    History of orthopedic surgery  left arm    FAMILY HISTORY:  FH: HIV infection (Mother)    MEDICATIONS (HOME):  Home Medications:    MEDICATIONS  (STANDING):  morphine  - Injectable 2 milliGRAM(s) IV Push Once    MEDICATIONS  (PRN):    ALLERGIES/INTOLERANCES:  Allergies  Ceclor (Unknown)  Toradol (Anaphylaxis; Swelling)  Toradol (Swelling)    Intolerances    VITALS & EXAMINATION:  Vital Signs Last 24 Hrs  T(C): 36.7 (07 Dec 2022 06:09), Max: 36.7 (07 Dec 2022 06:09)  T(F): 98 (07 Dec 2022 06:09), Max: 98 (07 Dec 2022 06:09)  HR: 72 (07 Dec 2022 06:09) (72 - 72)  BP: 130/71 (07 Dec 2022 06:09) (130/71 - 130/71)  BP(mean): --  RR: 15 (07 Dec 2022 06:09) (15 - 15)  SpO2: 100% (07 Dec 2022 06:09) (100% - 100%)    Parameters below as of 07 Dec 2022 06:38  Patient On (Oxygen Delivery Method): room air       LABORATORY:  CBC   Chem     LFTs   Coagulopathy   Lipid Panel   A1c   Cardiac enzymes     U/A   CSF  Other    STUDIES & IMAGING: (EEG, CT, MR, U/S, TTE/GARFIELD): Neurology Consultation     HPI:   Patient known to myself and neurology service    Patient LS is a 31 yo M w/ PMHx congenital HIV, chronic back pain, substance use, HIV myelopathy, asthma, multiple prior ED visits/ admissions for fluctuating/ worsening LE weakness and urinary retention, presents again for back pain, LE weakness (L worse than R), saddle anesthesia.     Of note, has been seen by neurology multiple times in the past, 11/19/22, 9/13/22, by myself in 8/9/22, 1/2022, 9/2021 frequently for similar symptoms (ie several days of worsening LE weakness of toes to hips, bowel/bladder incontinence, lower back pain, muscle spasms in lumbar back). Takes baclofen, gabapentin, lyrica. In 9/13/22 he was evaluated by neuro for fluctuating LE weakness and couldn't get out of bed. Most evaluations had suspicion for b/l LE weakness due to myelopathy 2/2 HIV infection and NOT GBS. Patient has had hx of normal LP and normal spine MRI's. Prior hx of GBS noted to be incorrect (refer to consult notes from 1/2022 and 9/2021). However, prior notes suggest ID did not suspect HIV myelopathy given it is considered end stage disease of HIV and has had good range for CD4 count and VL. Patient has had multiple MRI lumbar spines (6/5/21, 5/21/21, 5/2/21, 10/26/20). MRI cervical spine (5/2/21), MRI thoracic spine (5/2/21, 10/26/20), MRI head (3/25/20). Most recent MRI of thoracic and lumbar spine 9/2/22 showed  No cord or cauda equina compression. No abnormal cord signal or enhancement. HIE notes from other systems report EMG/NCS in 2021 as being normal. Also has had extensive workup done in [facility] and at other hospitals. Prior tox w/u has also revealed pos for cocaine and marijuana.    LP 5/2021:   VDRL neg, lyme neg, fungal neg, cltr neg, PCR neg  protein 45  glucose 48  No color  TNC 0, RBC 0     Has had prior myelopathy w/u, rheum w/u in HIE that was reportedly normal.    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    Asthma    Closed fracture of multiple ribs of right side, initial encounter    Cocaine abuse    Chronic sinusitis    Homeless    History of orthopedic surgery  left arm    FAMILY HISTORY:  FH: HIV infection (Mother)    MEDICATIONS (HOME):  Home Medications:    MEDICATIONS  (STANDING):  morphine  - Injectable 2 milliGRAM(s) IV Push Once    MEDICATIONS  (PRN):    ALLERGIES/INTOLERANCES:  Allergies  Ceclor (Unknown)  Toradol (Anaphylaxis; Swelling)  Toradol (Swelling)    Intolerances    VITALS & EXAMINATION:  Vital Signs Last 24 Hrs  T(C): 36.7 (07 Dec 2022 06:09), Max: 36.7 (07 Dec 2022 06:09)  T(F): 98 (07 Dec 2022 06:09), Max: 98 (07 Dec 2022 06:09)  HR: 72 (07 Dec 2022 06:09) (72 - 72)  BP: 130/71 (07 Dec 2022 06:09) (130/71 - 130/71)  BP(mean): --  RR: 15 (07 Dec 2022 06:09) (15 - 15)  SpO2: 100% (07 Dec 2022 06:09) (100% - 100%)    Parameters below as of 07 Dec 2022 06:38  Patient On (Oxygen Delivery Method): room air       LABORATORY:  CBC   Chem     LFTs   Coagulopathy   Lipid Panel   A1c   Cardiac enzymes     U/A   CSF  Other    STUDIES & IMAGING: (EEG, CT, MR, U/S, TTE/GARFIELD): Neurology Consultation     HPI:   Patient known to myself and neurology service    Patient LS is a 31 yo M w/ PMHx congenital HIV, chronic back pain, substance use, HIV myelopathy, asthma, multiple prior ED visits/ admissions for fluctuating/ worsening LE weakness and urinary retention, presents again for back pain, LE weakness (L worse than R), saddle anesthesia. States he has still been having progressive weakness of extremities, with intermittent incontinence that is making it difficult to manage while he is undomiciled or when he has opportunity to stay in shelter. He is now amenable to starting process for skilled nursing facility, which he previously was resistant to given that he was young and tried his best to manage his symptoms. He also states because he has difficulty with housing, he has difficulty making doctor appts, and picking up meds that are sent to pharmacy by his PCP.     Of note, has been seen by neurology multiple times in the past, 11/19/22, 9/13/22, by myself in 8/9/22, 1/2022, 9/2021 frequently for similar symptoms (ie several days of worsening LE weakness of toes to hips, bowel/bladder incontinence, lower back pain, muscle spasms in lumbar back). Takes baclofen, gabapentin, lyrica. In 9/13/22 he was evaluated by neuro for fluctuating LE weakness and couldn't get out of bed. Most evaluations had suspicion for b/l LE weakness due to myelopathy 2/2 HIV infection and NOT GBS. Patient has had hx of normal LP and normal spine MRI's. Prior hx of GBS noted to be incorrect (refer to consult notes from 1/2022 and 9/2021). However, prior notes suggest ID did not suspect HIV myelopathy given it is considered end stage disease of HIV and has had good range for CD4 count and VL. Patient has had multiple MRI lumbar spines (6/5/21, 5/21/21, 5/2/21, 10/26/20). MRI cervical spine (5/2/21), MRI thoracic spine (5/2/21, 10/26/20), MRI head (3/25/20). Most recent MRI of thoracic and lumbar spine 9/2/22 showed  No cord or cauda equina compression. No abnormal cord signal or enhancement. HIE notes from other systems report EMG/NCS in 2021 as being normal. Also has had extensive workup done in [facility] and at other hospitals. Prior tox w/u has also revealed pos for cocaine and marijuana.    LP 5/2021:   VDRL neg, lyme neg, fungal neg, cltr neg, PCR neg  protein 45  glucose 48  No color  TNC 0, RBC 0     Has had prior myelopathy w/u, rheum w/u in HIE that was reportedly normal.    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    Asthma    Closed fracture of multiple ribs of right side, initial encounter    Cocaine abuse    Chronic sinusitis    Homeless    History of orthopedic surgery  left arm    FAMILY HISTORY:  FH: HIV infection (Mother)    MEDICATIONS (HOME):  Home Medications:    MEDICATIONS  (STANDING):  morphine  - Injectable 2 milliGRAM(s) IV Push Once    MEDICATIONS  (PRN):    ALLERGIES/INTOLERANCES:  Allergies  Ceclor (Unknown)  Toradol (Anaphylaxis; Swelling)  Toradol (Swelling)    Intolerances    VITALS & EXAMINATION:  Vital Signs Last 24 Hrs  T(C): 36.7 (07 Dec 2022 06:09), Max: 36.7 (07 Dec 2022 06:09)  T(F): 98 (07 Dec 2022 06:09), Max: 98 (07 Dec 2022 06:09)  HR: 72 (07 Dec 2022 06:09) (72 - 72)  BP: 130/71 (07 Dec 2022 06:09) (130/71 - 130/71)  BP(mean): --  RR: 15 (07 Dec 2022 06:09) (15 - 15)  SpO2: 100% (07 Dec 2022 06:09) (100% - 100%)    Parameters below as of 07 Dec 2022 06:38  Patient On (Oxygen Delivery Method): room air      General:  Constitutional: Male, appears stated age, nontoxic appearing  Head: normocephalic  Eyes: clear sclera  Respiratory: breathing comfortably  Extremities: chronic skin changes.     Neurological (>12):  MS: Awake, alert, oriented to person, place, situation, time. Normal affect. Follows all commands.  Language: Speech is clear, fluent intact repetition, comprehension.    CNs: PERRL. VFF. EOMI no nystagmus. V1-3 intact to LT. No facial asymmetry b/l, full eye closure strength b/l. Gag reflex deferred. Head turning & shoulder shrug intact b/l. Tongue midline, normal movements, no atrophy.    Motor: Normal muscle bulk. + spasticity. No noticeable tremor or seizure. No pronator drift. 5/5 strength in UE's.     	H-Flex	H-ABd	H-ADd	K-Flex	K-Ext	D-Flex	P-Flex  R	3	-	-	-	4-	4	4	 	   L	2	-	-	4-	4-	3	3		     Sensation: Intact to LT b/l throughout.     Cortical: Extinction on DSS (neglect): none    Reflexes:              Biceps(C5)       BR(C6)     Triceps(C7)               Patellar(L4)    Achilles(S1)    Plantar Resp  R	3	          2	             2		        3		    3		up  L	3	          2	             2		        3		    3		up     R and L foot 4 beats ankle clonus  Negative hills sign RUE, positive in LUE  Coordination: No dysmetria to FTN  Gait: GENE     LABORATORY:                        12.4   2.66  )-----------( 145      ( 07 Dec 2022 08:00 )             39.4   12-07    133<L>  |  100  |  18  ----------------------------<  86  4.2   |  22  |  0.70    Ca    8.5      07 Dec 2022 08:00    TPro  7.8  /  Alb  4.0  /  TBili  0.3  /  DBili  x   /  AST  42<H>  /  ALT  18  /  AlkPhos  76  12-07      LFTs   Coagulopathy   Lipid Panel   A1c   Cardiac enzymes     U/A   CSF  Other    STUDIES & IMAGING: (EEG, CT, MR, U/S, TTE/GARFIELD):    < from: MR Lumbar Spine w/ IV Cont (09.02.22 @ 13:49) >    ACC: [id number] EXAM:  MR SPINE THORACIC IC                        ACC: [id number] EXAM:  MR SPINE LUMBAR IC                          PROCEDURE DATE:  09/02/2022          INTERPRETATION:  CLINICAL INDICATION: Worsening lower extremity weakness   and urinary retention    Magnetic resonance imaging of the thoracic and lumbosacral spine was   carried out with sagittal surface coil imaging from T1 to coccyx using T1   and fast spin echo T2 weighted images with and without fat saturation   technique with axial T1 and fast spin echo T2 weighted imaging on a 1.5   Addie magnet. Post contrast axial and sagittal T1 weighted images were   obtained. 8.5 cc of Gadavist were intravenously injected, 1.5 cc were   discarded.    The patient was imaged while sedated and continuously monitored by the   anesthesiologist.    The visualized thoracic and lumbosacral vertebral bodies are normal in   height and signal intensity. There are no acute fractures or   dislocations. There is no disc herniation or spinal stenosis. There is no   cord compression or abnormal cord signal. The cord terminates normally at   the L1-2 level. The cauda equina is unremarkable. There is no abnormal   parenchymal or leptomeningeal enhancement. The paraspinal soft tissues   are unremarkable.    IMPRESSION: No cord or cauda equina compression. No abnormal cord signal   or enhancement.    --- End of Report ---    BENNETT OCHOA MD; Attending Radiologist  This document has been electronically signed. Sep  2 2022  2:57PM    < end of copied text >

## 2022-12-07 NOTE — ED PROVIDER NOTE - PROGRESS NOTE DETAILS
ANA Baum: Spoke w/ MRI will expedite pt ANA Baum: neuro c/s called ANA Baum: Spoke with MRI again, state they still have the same pt on their table, our pt is still next ANA Baum: Pt seen by neuro, recommend cancelling MR studies. State pt is well known to them, has had negative imaging studies throughout [facility] as well as a spinal tap w/o visible pathology. State pt has myelopathy, possibly related to HIV. Recommend admitting pt for social work/case management and skilled nursing facility placement. Supervising Statement (ISAIAH Baum, PA-C): I have personally seen and examined this patient.  I have fully participated in the care of this patient. I have reviewed all pertinent clinical information, including history, physical exam, plan and PA student’s note and agree as noted.

## 2022-12-07 NOTE — ED PROVIDER NOTE - ATTENDING CONTRIBUTION TO CARE
The patient is a 32y Homeless Male who has a past medical and surgery history of congenitally acquired HIV Asthma Closed Rt  multiple rib fractures and LUE surgery, Cocaine abuse Chronic sinusitis PTED with C/O lower extremity weakness x1 day. No complaints of chest pain, headache, nausea, dizziness, vomiting  SOB, fever, chills verbalized. Pmhx HIV Guillain-Barré syndrome    Vital Signs Last 24 Hrs  T(F): 98 HR: 72 BP: 130/71 RR: 15 SpO2: 100% (07 Dec 2022 06:09)   PE: as described; my additions and exceptions are noted in the chart    DATA:  EKG: pending at time of evaluation  LAB: Pending at time of evaluation    IMPRESSION/RISK:  Dx= LE weakness     Consideration include lack of rectal tone LE weakness necessitates MRI/lumbar/thoracic interesting that LE DTRs preserved   Plan  as above  rectal temp for fever   Neurology consult   MRI T L spine after NIF from respiratory   preop labs/cultures BSABX if febrile   reasess   dispo as per results and response

## 2022-12-07 NOTE — ED ADULT NURSE NOTE - CHIEF COMPLAINT QUOTE
Pt C/O lower extremity weakness x1 day. No complaints of chest pain, headache, nausea, dizziness, vomiting  SOB, fever, chills verbalized. Pmhx HIV Guillain-Barré syndrome

## 2022-12-07 NOTE — ED ADULT NURSE NOTE - OBJECTIVE STATEMENT
alert oriented c/o kellee LE weakness body aches sore throat cough  states he has been incontinent at times and is having difficulty taking care of himself  no c/o CP dizziness or SOB

## 2022-12-07 NOTE — ED PROVIDER NOTE - NEUROLOGICAL MOTOR HIPFLEXION LEFT
3/5 MOVEMENT AGAINST GRAVITY, BUT NOT AGAINST ADDED RESISTANCE. 2/5 MOVEMENT AT JOINT, BUT NOT AGAINST GRAVITY.

## 2022-12-07 NOTE — H&P ADULT - HISTORY OF PRESENT ILLNESS
Patient is a 31 yo male PMHx of congenital HIV, GBS, and recently resolved COVID infection presenting with leg weakness bilaterally. States that symptoms started 3-4 days ago when he got up in the morning and started feeling ill. Reports having a sore throat, cough, and runny nose when symptoms started. Expressed that when he went to sleep, he would wake up to find that he urinated on himself. States that he has reduced the amount he drinks out of fear of urinating. Explains that he goes to the bathroom 3 times a day. Expresses that there is also bilateral back pain associated with symptoms located in the bottom sacroiliac regions bilaterally. Ranks that pain a 7/10 in severity. Patient claims he has not taken anything for the pain and movement makes back pain worse. Denies any fever, chills, nausea, vomiting, weight loss, weight gain, SOB, ESCUDERO, paralysis, and saddle anesthesia. Patient is a 33 yo male PMHx of congenital HIV c myelopathy, GBS, and recently resolved COVID infection presenting with leg weakness bilaterally. States that symptoms started 3-4 days ago when he got up in the morning and started feeling ill. Reports having a sore throat, cough, and runny nose when symptoms started. Expressed that when he went to sleep, he would wake up to find that he urinated on himself. States that he has reduced the amount he drinks out of fear of urinating. Explains that he goes to the bathroom 3 times a day. Expresses that there is also bilateral back pain associated with symptoms located in the bottom sacroiliac regions bilaterally. Ranks that pain a 7/10 in severity. Patient claims he has not taken anything for the pain and movement makes back pain worse. Denies any fever, chills, nausea, vomiting, weight loss, weight gain, SOB, ESCUDERO, paralysis, and saddle anesthesia.

## 2022-12-07 NOTE — CONSULT NOTE ADULT - ATTENDING COMMENTS
He has had progressive symptoms for over 4 years.    Congenital HIV. Taking his medications sporadically.     Exam: Reflexes 3 throughout including pec major and finger flexors. B Babinski signs.   Motor exam limited by pain.     Vitamin B12, Serum: 298 pg/mL (08.31.22 @ 05:35)   Vitamin B12, Serum: 396 pg/mL (09.09.21 @ 19:28)   Methylmalonic Acid Level: 62  Homocysteine, Serum: 12.8 umol/L (08.09.22 @ 16:00)     The following work up is normal:  Alpha Tocopherol: 9.4:  Copper, Serum: 113  Zinc Level, Plasma: 86  HTLV-I/-II Ab Screen, S Negative Negative   Treponema Pallidum Antibody Interpretation: Negative    MRI T spine - 9/2/22 - IMPRESSION: No cord or cauda equina compression. No abnormal cord signal or enhancement.  MRI C,T,LS - IMPRESSION:  Motion degraded exam.  Mild spondylitic changes.  No spinal cord or intraspinal abnormality identified.    A/P  Mr. Garsia is a 31 yo man with a progressive myelopathy for at least 4 year.  He has had extensive work up in the past - no compressive myelopathy, no demyelinating disease, no other cause found on labs tests.   Given that he has a h/o congenital HIV and that he has not taken HIV medications consistently - AIDS myelopathy is possible but is usually an end stage disease.   Aggressive treatment of HIV.  Vitamin B12 supplementation 1000 mg Daily - not likely contributing but want to keep B 12 levels above 400.   PT/OT  Social work  Pain management  Thank you

## 2022-12-07 NOTE — H&P ADULT - CARDIOVASCULAR
regular rate and rhythm/S1 S2 present/no gallops/no rub/no murmur/normal PMI/no pedal edema details…

## 2022-12-07 NOTE — H&P ADULT - PROBLEM SELECTOR PLAN 1
-  Admit to medicine  - B/l LE weakness, hyperreflexia, spasticity, positive Babinski concerning for myelopathy possibly from HIV.  - Neurology called; Given multiple prior MRIs of the spine and progression of prior symptoms over a span of 4 years, can defer obtaining MRI for now  - PT/OT/SW eval for dispo  - COntinue Home medications for pain  - B12, MMA, homocysteine, folate, CK, ESR, CRP, HIV labs (VL, CD4 count), A1c,   - Outpatient follow up with Dr Raman Tapia or Dr Des Garcia or if insurance is an issue may go to Neuro clinic  at [facility] 4th floor  - Vitamin b12 1000mg qD

## 2022-12-07 NOTE — CONSULT NOTE ADULT - ASSESSMENT
Patient LS is a 33 yo M w/ PMHx congenital HIV, chronic back pain, substance use, HIV myelopathy, asthma, multiple prior ED visits/ admissions for fluctuating/ worsening LE weakness and urinary retention, presents again for back pain, LE weakness (L worse than R), saddle anesthesia, incontinence. He has had extensive neurologic workup on several ED visits/ admissions for similar symptoms (11/19/22, 9/13/22, 8/9/22, 1/2022, 9/2021) and has had multiple spine MRIs without cord/cauda equina compression / abnormal enhancement or lesions, LP in 2021, and EMG/NCS. He takes baclofen, gabapentin, lyrica. Prior differential included HIV myelopathy and not GBS based on exam and CSF studies. Although ID previously had not suspected HIV myelopathy given it is considered end stage disease of HIV, his adherence to medications may be difficult due to his domiciled status. Has also had rheum, infectious workup for myelopathy as well that was unrevealing for cause.     Impression: B/l LE weakness, hyperreflexia, spasticity, positive Babinski concerning for myelopathy possibly from HIV. No prior evidence of compressive myelopathy, cord compression on prior imaging. Exam, prior workup not consistent with GBS.      Recommendations:  [ ] Given multiple prior MRIs of the spine and progression of prior symptoms over a span of 4 years, can defer obtaining MRI for now  [ ] Recommend PT, OT, SW evaluation as patient now amenable to moving to long term housing ?SNF as he has difficulty managing ADLs given his symptoms  [ ] Can continue his prescribed doses for lyrica, gabapentin, baclofen. Consider pain consultation. Has reported prior relief from robaxin  [ ] Expressed importance of outpatient follow up with ID and neurology. If he has insurance difficulty, can follow up with neurology resident clinic  at [facility] 4th floor. Otherwise can see Dr Raman Tapia or Dr Des Garcia.  [ ] B12, MMA, homocysteine, folate, CK, ESR, CRP, HIV labs (VL, CD4 count), A1c,   [ ] Consider ID evaluation for long term management/ follow up if planning long term care     Patient was seen on morning rounds with attending and neurology team. Recommendations finalized once signed by attending.        Patient LS is a 33 yo M w/ PMHx congenital HIV, chronic back pain, substance use, HIV myelopathy, asthma, multiple prior ED visits/ admissions for fluctuating/ worsening LE weakness and urinary retention, presents again for back pain, LE weakness (L worse than R), saddle anesthesia, incontinence. He has had extensive neurologic workup on several ED visits/ admissions for similar symptoms (11/19/22, 9/13/22, 8/9/22, 1/2022, 9/2021) and has had multiple spine MRIs without cord/cauda equina compression / abnormal enhancement or lesions, LP in 2021, and EMG/NCS. He takes baclofen, gabapentin, lyrica. Prior differential included HIV myelopathy and not GBS based on exam and CSF studies. Although ID previously had not suspected HIV myelopathy given it is considered end stage disease of HIV, his adherence to medications may be difficult due to his domiciled status. Has also had rheum, infectious workup for myelopathy as well that was unrevealing for cause.     Impression: B/l LE weakness, hyperreflexia, spasticity, positive Babinski concerning for myelopathy possibly from HIV. No prior evidence of compressive myelopathy, cord compression on prior imaging. Exam, prior workup not consistent with GBS.      Recommendations:  [ ] Given multiple prior MRIs of the spine and progression of prior symptoms over a span of 4 years, can defer obtaining MRI for now  [ ] Recommend PT, OT, SW evaluation as patient now amenable to moving to long term housing ?SNF as he has difficulty managing ADLs given his symptoms  [ ] Can continue his prescribed doses for lyrica, gabapentin, baclofen. Consider pain consultation. Has reported prior relief from robaxin  [ ] Expressed importance of outpatient follow up with ID and neurology. If he has insurance difficulty, can follow up with neurology resident clinic  at [facility] 4th floor. Otherwise can see Dr Raman Tapia or Dr Des Garcia.  [ ] B12, MMA, homocysteine, folate, CK, ESR, CRP, HIV labs (VL, CD4 count), A1c,   [ ] Consider ID evaluation for long term management/ follow up if planning long term care  [ ] Neurology service to sign off, please page for any questions     Patient was seen on morning rounds with attending and neurology team. Recommendations finalized once signed by attending.        Patient LS is a 33 yo M w/ PMHx congenital HIV, chronic back pain, substance use, HIV myelopathy, asthma, multiple prior ED visits/ admissions for fluctuating/ worsening LE weakness and urinary retention, presents again for back pain, LE weakness (L worse than R), saddle anesthesia, incontinence. He has had extensive neurologic workup on several ED visits/ admissions for similar symptoms (11/19/22, 9/13/22, 8/9/22, 1/2022, 9/2021) and has had multiple spine MRIs without cord/cauda equina compression / abnormal enhancement or lesions, LP in 2021, and EMG/NCS. He takes baclofen, gabapentin, lyrica. Prior differential included HIV myelopathy and not GBS based on exam and CSF studies. Although ID previously had not suspected HIV myelopathy given it is considered end stage disease of HIV, his adherence to medications may be difficult due to his domiciled status. Has also had rheum, infectious workup for myelopathy as well that was unrevealing for cause.     Impression: B/l LE weakness, hyperreflexia, spasticity, positive Babinski concerning for myelopathy possibly from HIV. No prior evidence of compressive myelopathy, cord compression on prior imaging. Exam, prior workup not consistent with GBS.      Recommendations:  [ ] Given multiple prior MRIs of the spine and progression of prior symptoms over a span of 4 years, can defer obtaining MRI for now  [ ] Recommend PT, OT, SW evaluation as patient now amenable to moving to long term housing ?SNF as he has difficulty managing ADLs given his symptoms  [ ] Can continue his prescribed doses for lyrica, gabapentin, baclofen. Consider pain consultation. Has reported prior relief from robaxin  [ ] Expressed importance of outpatient follow up with ID and neurology. If he has insurance difficulty, can follow up with neurology resident clinic  at [facility] 4th floor. Otherwise can see Dr Raman Tapia or Dr Des Garcia.  [ ] B12, MMA, homocysteine, folate, CK, ESR, CRP, HIV labs (VL, CD4 count), A1c,   [ ] Consider ID evaluation for long term management/ follow up if planning long term care  [ ] Neurology service to sign off, please page for any questions     Patient was seen on morning rounds with attending Dr Fam and neurology team. Recommendations finalized once signed by attending.

## 2022-12-08 LAB
4/8 RATIO: 0.18 RATIO — LOW (ref 0.9–3.6)
A1C WITH ESTIMATED AVERAGE GLUCOSE RESULT: 4.3 % — SIGNIFICANT CHANGE UP (ref 4–5.6)
ABS CD8: 825 CELLS/UL — HIGH (ref 142–740)
ANION GAP SERPL CALC-SCNC: 12 MMOL/L — SIGNIFICANT CHANGE UP (ref 7–14)
BUN SERPL-MCNC: 7 MG/DL — SIGNIFICANT CHANGE UP (ref 7–23)
CALCIUM SERPL-MCNC: 8.6 MG/DL — SIGNIFICANT CHANGE UP (ref 8.4–10.5)
CD3 BLASTS SPEC-ACNC: 1017 CELLS/UL — SIGNIFICANT CHANGE UP (ref 672–1870)
CD3 BLASTS SPEC-ACNC: 87 % — HIGH (ref 59–83)
CD4 %: 12 % — LOW (ref 30–62)
CD8 %: 70 % — HIGH (ref 12–36)
CHLORIDE SERPL-SCNC: 103 MMOL/L — SIGNIFICANT CHANGE UP (ref 98–107)
CK SERPL-CCNC: 75 U/L — SIGNIFICANT CHANGE UP (ref 30–200)
CO2 SERPL-SCNC: 23 MMOL/L — SIGNIFICANT CHANGE UP (ref 22–31)
CREAT SERPL-MCNC: 0.77 MG/DL — SIGNIFICANT CHANGE UP (ref 0.5–1.3)
CRP SERPL-MCNC: <3 MG/L — SIGNIFICANT CHANGE UP
EGFR: 121 ML/MIN/1.73M2 — SIGNIFICANT CHANGE UP
ERYTHROCYTE [SEDIMENTATION RATE] IN BLOOD: 14 MM/HR — SIGNIFICANT CHANGE UP (ref 1–15)
ESTIMATED AVERAGE GLUCOSE: 77 — SIGNIFICANT CHANGE UP
FOLATE SERPL-MCNC: 12.6 NG/ML — SIGNIFICANT CHANGE UP (ref 3.1–17.5)
GLUCOSE SERPL-MCNC: 90 MG/DL — SIGNIFICANT CHANGE UP (ref 70–99)
HCT VFR BLD CALC: 39.1 % — SIGNIFICANT CHANGE UP (ref 39–50)
HGB BLD-MCNC: 12.4 G/DL — LOW (ref 13–17)
HIV-1 VIRAL LOAD RESULT: ABNORMAL
HIV1 RNA # SERPL NAA+PROBE: SIGNIFICANT CHANGE UP
HIV1 RNA SER-IMP: SIGNIFICANT CHANGE UP
HIV1 RNA SERPL NAA+PROBE-ACNC: ABNORMAL
HIV1 RNA SERPL NAA+PROBE-LOG#: 5.59 — SIGNIFICANT CHANGE UP
MAGNESIUM SERPL-MCNC: 1.5 MG/DL — LOW (ref 1.6–2.6)
MCHC RBC-ENTMCNC: 28.6 PG — SIGNIFICANT CHANGE UP (ref 27–34)
MCHC RBC-ENTMCNC: 31.7 GM/DL — LOW (ref 32–36)
MCV RBC AUTO: 90.3 FL — SIGNIFICANT CHANGE UP (ref 80–100)
NRBC # BLD: 0 /100 WBCS — SIGNIFICANT CHANGE UP (ref 0–0)
NRBC # FLD: 0 K/UL — SIGNIFICANT CHANGE UP (ref 0–0)
PHOSPHATE SERPL-MCNC: 2.8 MG/DL — SIGNIFICANT CHANGE UP (ref 2.5–4.5)
PLATELET # BLD AUTO: 175 K/UL — SIGNIFICANT CHANGE UP (ref 150–400)
POTASSIUM SERPL-MCNC: 3.7 MMOL/L — SIGNIFICANT CHANGE UP (ref 3.5–5.3)
POTASSIUM SERPL-SCNC: 3.7 MMOL/L — SIGNIFICANT CHANGE UP (ref 3.5–5.3)
RBC # BLD: 4.33 M/UL — SIGNIFICANT CHANGE UP (ref 4.2–5.8)
RBC # FLD: 13.2 % — SIGNIFICANT CHANGE UP (ref 10.3–14.5)
SODIUM SERPL-SCNC: 138 MMOL/L — SIGNIFICANT CHANGE UP (ref 135–145)
T-CELL CD4 SUBSET PNL BLD: 145 CELLS/UL — LOW (ref 489–1457)
VIT B12 SERPL-MCNC: 405 PG/ML — SIGNIFICANT CHANGE UP (ref 200–900)
WBC # BLD: 2.17 K/UL — LOW (ref 3.8–10.5)
WBC # FLD AUTO: 2.17 K/UL — LOW (ref 3.8–10.5)

## 2022-12-08 PROCEDURE — 99233 SBSQ HOSP IP/OBS HIGH 50: CPT

## 2022-12-08 RX ORDER — BENZOCAINE AND MENTHOL 5; 1 G/100ML; G/100ML
1 LIQUID ORAL ONCE
Refills: 0 | Status: COMPLETED | OUTPATIENT
Start: 2022-12-08 | End: 2022-12-08

## 2022-12-08 RX ORDER — MAGNESIUM SULFATE 500 MG/ML
2 VIAL (ML) INJECTION ONCE
Refills: 0 | Status: COMPLETED | OUTPATIENT
Start: 2022-12-08 | End: 2022-12-08

## 2022-12-08 RX ADMIN — BICTEGRAVIR SODIUM, EMTRICITABINE, AND TENOFOVIR ALAFENAMIDE FUMARATE 1 TABLET(S): 30; 120; 15 TABLET ORAL at 12:01

## 2022-12-08 RX ADMIN — Medication 1 SPRAY(S): at 05:35

## 2022-12-08 RX ADMIN — Medication 1 SPRAY(S): at 18:15

## 2022-12-08 RX ADMIN — BENZOCAINE AND MENTHOL 1 LOZENGE: 5; 1 LIQUID ORAL at 05:36

## 2022-12-08 RX ADMIN — Medication 50 MILLIGRAM(S): at 21:45

## 2022-12-08 RX ADMIN — Medication 25 GRAM(S): at 09:16

## 2022-12-08 RX ADMIN — PREGABALIN 1000 MICROGRAM(S): 225 CAPSULE ORAL at 12:03

## 2022-12-08 RX ADMIN — Medication 50 MILLIGRAM(S): at 14:41

## 2022-12-08 RX ADMIN — Medication 50 MILLIGRAM(S): at 05:35

## 2022-12-08 RX ADMIN — QUETIAPINE FUMARATE 100 MILLIGRAM(S): 200 TABLET, FILM COATED ORAL at 12:04

## 2022-12-08 RX ADMIN — QUETIAPINE FUMARATE 300 MILLIGRAM(S): 200 TABLET, FILM COATED ORAL at 21:45

## 2022-12-08 NOTE — PROGRESS NOTE ADULT - PROBLEM SELECTOR PLAN 3
#Lovenox for DVT prevention    #Social Disposition issue: patient received from shelter, previously offered SNF but declined; at this time is amenable to placement in SNF as he is unable to take care of himself. SW input appreciated.

## 2022-12-08 NOTE — PROGRESS NOTE ADULT - PROBLEM SELECTOR PLAN 1
Appreciate Neurology consult and recs, Neurology suspecting likely myelopathy possibly from HIV, recent prior MRIs reviewed, no need for repeat MRI at this time per Neuro  - PT/OT evaluation  - c/w gabapentin, baclofen  - outpatient ID and Neurology follow up

## 2022-12-08 NOTE — PHYSICAL THERAPY INITIAL EVALUATION ADULT - MANUAL MUSCLE TESTING RESULTS, REHAB EVAL
bilateral UE grossly at least 4/5 throughout, right LE grossly 4/5 throughout, left LE grossly 3+5 throughout except bilateral ankle dorsiflexors 2/5.

## 2022-12-08 NOTE — PROGRESS NOTE ADULT - PROBLEM SELECTOR PLAN 2
- Continue HAART  - Check viral load and T-Cell counts  - Adherence continued to be reinforced with patient.    #Recent COVID Infection - per infection control guidelines should maintain isolation for ~21 days following infection (initial positive on 11/22) - Currently testing negative

## 2022-12-08 NOTE — OCCUPATIONAL THERAPY INITIAL EVALUATION ADULT - PERTINENT HX OF CURRENT PROBLEM, REHAB EVAL
32 year old male with history of congenital HIV myelopathy, GBS, recently resolved COVID infection presenting with leg weakness bilaterally likely due to myelopathy.

## 2022-12-08 NOTE — OCCUPATIONAL THERAPY INITIAL EVALUATION ADULT - PHYSICAL ASSIST/NONPHYSICAL ASSIST:DRESS LOWER BODY, OT EVAL
Pt calling, he's at [facility] pharmacy now requesting refill of valsartan hctz. He reports he spoke to [facility] [place] and they dispense medication from manufacturers that have been recalled.   He verified with [facility]'s pharmacy and they can dispense medication that was not recalled from the .   Script sent to [facility], nothing further needed.    Statement Selected verbal cues/nonverbal cues (demo/gestures)/1 person assist

## 2022-12-08 NOTE — PHYSICAL THERAPY INITIAL EVALUATION ADULT - ADDITIONAL COMMENTS
Pt reports that he lives in a shelter with 5 steps to enter and +right handrail. Prior to admission, pt ambulated independently with a single axis cane. Pt reports multiple falls in the past month with the most recent fall one week ago.     Pt left comfortably in bed with all lines intact, NAD, call bell in reach, +bed alarm. RN made aware.

## 2022-12-08 NOTE — PROGRESS NOTE ADULT - SUBJECTIVE AND OBJECTIVE BOX
Patient is a 32y old  Male who presents with a chief complaint of Monitor for cauda equina syndrome (07 Dec 2022 14:12)    SUBJECTIVE / OVERNIGHT EVENTS: No acute events. Comfortable. Worked with PT earlier today which he was pleased by. Still with lower extremity weakness.     MEDICATIONS  (STANDING):  bictegravir 50 mG/emtricitabine 200 mG/tenofovir alafenamide 25 mG (BIKTARVY) 1 Tablet(s) Oral daily  cyanocobalamin 1000 MICROGram(s) Oral daily  fluticasone propionate 50 MICROgram(s)/spray Nasal Spray 1 Spray(s) Both Nostrils two times a day  influenza   Vaccine 0.5 milliLiter(s) IntraMuscular once  nicotine -  14 mG/24Hr(s) Patch 1 Patch Transdermal daily  pregabalin 50 milliGRAM(s) Oral three times a day  QUEtiapine 100 milliGRAM(s) Oral daily  QUEtiapine 300 milliGRAM(s) Oral at bedtime    MEDICATIONS  (PRN):  acetaminophen     Tablet .. 650 milliGRAM(s) Oral every 6 hours PRN Temp greater or equal to 38C (100.4F), Mild Pain (1 - 3)  baclofen 5 milliGRAM(s) Oral every 8 hours PRN Muscle Spasm or pain  melatonin 3 milliGRAM(s) Oral at bedtime PRN Insomnia    CAPILLARY BLOOD GLUCOSE    I&O's Summary    07 Dec 2022 07:01  -  08 Dec 2022 07:00  --------------------------------------------------------  IN: 150 mL / OUT: 700 mL / NET: -550 mL    PHYSICAL EXAM:  Vital Signs Last 24 Hrs  T(C): 37 (08 Dec 2022 12:30), Max: 37 (08 Dec 2022 12:30)  T(F): 98.6 (08 Dec 2022 12:30), Max: 98.6 (08 Dec 2022 12:30)  HR: 66 (08 Dec 2022 12:30) (60 - 66)  BP: 112/60 (08 Dec 2022 12:30) (104/58 - 114/57)  BP(mean): --  RR: 17 (08 Dec 2022 12:30) (17 - 18)  SpO2: 100% (08 Dec 2022 12:30) (100% - 100%)    Parameters below as of 08 Dec 2022 12:30  Patient On (Oxygen Delivery Method): room air    CONSTITUTIONAL: NAD, well-developed  EYES: conjunctiva and sclera clear  ENMT: Moist oral mucosa  RESPIRATORY: Normal respiratory effort; lungs are clear to auscultation bilaterally  CARDIOVASCULAR: Regular rate and rhythm, normal S1 and S2, No lower extremity edema; Peripheral pulses are 2+ bilaterally  ABDOMEN: Nontender to palpation, normoactive bowel sounds, no rebound/guarding  PSYCH: calm, affect appropriate  NEUROLOGY: sensation intact throughout to light touch, LLE 3/5 strength, RLE 4/5 strength   SKIN: No rashes; no palpable lesions    LABS:                        12.4   2.17  )-----------( 175      ( 08 Dec 2022 06:26 )             39.1     12-    138  |  103  |  7   ----------------------------<  90  3.7   |  23  |  0.77    Ca    8.6      08 Dec 2022 06:26  Phos  2.8     12-08  Mg     1.50     12-08    TPro  7.8  /  Alb  4.0  /  TBili  0.3  /  DBili  x   /  AST  42<H>  /  ALT  18  /  AlkPhos  76  12-07      CARDIAC MARKERS ( 08 Dec 2022 06:26 )  x     / x     / 75 U/L / x     / x          Urinalysis Basic - ( 07 Dec 2022 18:53 )    Color: Light Yellow / Appearance: Clear / S.008 / pH: x  Gluc: x / Ketone: Negative  / Bili: Negative / Urobili: <2 mg/dL   Blood: x / Protein: Negative / Nitrite: Negative   Leuk Esterase: Negative / RBC: 0-2 /HPF / WBC 0-2 /HPF   Sq Epi: x / Non Sq Epi: x / Bacteria: Negative    Culture - Blood (collected 07 Dec 2022 09:50)  Source: .Blood Blood-Venous  Preliminary Report (08 Dec 2022 13:02):    No growth to date.    Culture - Blood (collected 07 Dec 2022 09:35)  Source: .Blood Blood-Peripheral  Preliminary Report (08 Dec 2022 13:02):    No growth to date.    RADIOLOGY & ADDITIONAL TESTS: Reviewed    COORDINATION OF CARE:  Care Discussed with Consultants/Other Providers [Y- Medicine ACP, CM/SW]

## 2022-12-08 NOTE — PHYSICAL THERAPY INITIAL EVALUATION ADULT - PATIENT PROFILE REVIEW, REHAB EVAL
ACTIVITY ORDER: ambulate as tolerated; Spoke to ANDREA Alfaro prior --> Pt OK for PT evaluation and OOB activity/yes

## 2022-12-08 NOTE — PROGRESS NOTE ADULT - ASSESSMENT
33 yo male PMHx of congenital HIV myelopathy, GBS, recently resolved COVID infection presenting with leg weakness bilaterally.

## 2022-12-09 LAB
ANION GAP SERPL CALC-SCNC: 12 MMOL/L — SIGNIFICANT CHANGE UP (ref 7–14)
BUN SERPL-MCNC: 8 MG/DL — SIGNIFICANT CHANGE UP (ref 7–23)
CALCIUM SERPL-MCNC: 8.9 MG/DL — SIGNIFICANT CHANGE UP (ref 8.4–10.5)
CHLORIDE SERPL-SCNC: 102 MMOL/L — SIGNIFICANT CHANGE UP (ref 98–107)
CO2 SERPL-SCNC: 20 MMOL/L — LOW (ref 22–31)
CREAT SERPL-MCNC: 0.76 MG/DL — SIGNIFICANT CHANGE UP (ref 0.5–1.3)
CULTURE RESULTS: SIGNIFICANT CHANGE UP
EGFR: 122 ML/MIN/1.73M2 — SIGNIFICANT CHANGE UP
GLUCOSE SERPL-MCNC: 92 MG/DL — SIGNIFICANT CHANGE UP (ref 70–99)
MAGNESIUM SERPL-MCNC: 1.8 MG/DL — SIGNIFICANT CHANGE UP (ref 1.6–2.6)
PHOSPHATE SERPL-MCNC: 3.6 MG/DL — SIGNIFICANT CHANGE UP (ref 2.5–4.5)
POTASSIUM SERPL-MCNC: 4.5 MMOL/L — SIGNIFICANT CHANGE UP (ref 3.5–5.3)
POTASSIUM SERPL-SCNC: 4.5 MMOL/L — SIGNIFICANT CHANGE UP (ref 3.5–5.3)
SODIUM SERPL-SCNC: 134 MMOL/L — LOW (ref 135–145)
SPECIMEN SOURCE: SIGNIFICANT CHANGE UP

## 2022-12-09 PROCEDURE — 99233 SBSQ HOSP IP/OBS HIGH 50: CPT

## 2022-12-09 PROCEDURE — 99222 1ST HOSP IP/OBS MODERATE 55: CPT

## 2022-12-09 RX ORDER — BENZOCAINE AND MENTHOL 5; 1 G/100ML; G/100ML
1 LIQUID ORAL ONCE
Refills: 0 | Status: COMPLETED | OUTPATIENT
Start: 2022-12-09 | End: 2022-12-09

## 2022-12-09 RX ORDER — ENOXAPARIN SODIUM 100 MG/ML
40 INJECTION SUBCUTANEOUS EVERY 24 HOURS
Refills: 0 | Status: DISCONTINUED | OUTPATIENT
Start: 2022-12-09 | End: 2022-12-17

## 2022-12-09 RX ADMIN — Medication 50 MILLIGRAM(S): at 06:15

## 2022-12-09 RX ADMIN — Medication 1 TABLET(S): at 13:51

## 2022-12-09 RX ADMIN — Medication 50 MILLIGRAM(S): at 21:53

## 2022-12-09 RX ADMIN — Medication 50 MILLIGRAM(S): at 13:51

## 2022-12-09 RX ADMIN — QUETIAPINE FUMARATE 100 MILLIGRAM(S): 200 TABLET, FILM COATED ORAL at 13:28

## 2022-12-09 RX ADMIN — BICTEGRAVIR SODIUM, EMTRICITABINE, AND TENOFOVIR ALAFENAMIDE FUMARATE 1 TABLET(S): 30; 120; 15 TABLET ORAL at 13:28

## 2022-12-09 RX ADMIN — BENZOCAINE AND MENTHOL 1 LOZENGE: 5; 1 LIQUID ORAL at 09:59

## 2022-12-09 RX ADMIN — PREGABALIN 1000 MICROGRAM(S): 225 CAPSULE ORAL at 13:28

## 2022-12-09 RX ADMIN — Medication 1 SPRAY(S): at 06:46

## 2022-12-09 RX ADMIN — QUETIAPINE FUMARATE 300 MILLIGRAM(S): 200 TABLET, FILM COATED ORAL at 21:51

## 2022-12-09 NOTE — ED ADULT TRIAGE NOTE - NS ED NURSE AMBULANCES
Alert-The patient is alert, awake and responds to voice. The patient is oriented to time, place, and person. The triage nurse is able to obtain subjective information. [employer]

## 2022-12-09 NOTE — PROGRESS NOTE ADULT - SUBJECTIVE AND OBJECTIVE BOX
[facility] Division of [facility] Medicine  Sigrid Olivares M.D  Pager [phone]  Available via MS Teams    SUBJECTIVE / OVERNIGHT EVENTS: No acute events overnight.     ADDITIONAL REVIEW OF SYSTEMS:    MEDICATIONS  (STANDING):  benzocaine 15 mG/menthol 3.6 mG Lozenge 1 Lozenge Oral once  bictegravir 50 mG/emtricitabine 200 mG/tenofovir alafenamide 25 mG (BIKTARVY) 1 Tablet(s) Oral daily  cyanocobalamin 1000 MICROGram(s) Oral daily  fluticasone propionate 50 MICROgram(s)/spray Nasal Spray 1 Spray(s) Both Nostrils two times a day  influenza   Vaccine 0.5 milliLiter(s) IntraMuscular once  nicotine -  14 mG/24Hr(s) Patch 1 Patch Transdermal daily  pregabalin 50 milliGRAM(s) Oral three times a day  QUEtiapine 100 milliGRAM(s) Oral daily  QUEtiapine 300 milliGRAM(s) Oral at bedtime    MEDICATIONS  (PRN):  acetaminophen     Tablet .. 650 milliGRAM(s) Oral every 6 hours PRN Temp greater or equal to 38C (100.4F), Mild Pain (1 - 3)  baclofen 5 milliGRAM(s) Oral every 8 hours PRN Muscle Spasm or pain  melatonin 3 milliGRAM(s) Oral at bedtime PRN Insomnia      I&O's Summary    08 Dec 2022 07:01  -  09 Dec 2022 07:00  --------------------------------------------------------  IN: 0 mL / OUT: 400 mL / NET: -400 mL        PHYSICAL EXAM:  Vital Signs Last 24 Hrs  T(C): 37.1 (09 Dec 2022 06:10), Max: 37.1 (09 Dec 2022 06:10)  T(F): 98.7 (09 Dec 2022 06:10), Max: 98.7 (09 Dec 2022 06:10)  HR: 57 (09 Dec 2022 06:10) (57 - 66)  BP: 109/62 (09 Dec 2022 06:10) (109/62 - 123/72)  BP(mean): --  RR: 17 (09 Dec 2022 06:10) (16 - 17)  SpO2: 100% (09 Dec 2022 06:10) (100% - 100%)    Parameters below as of 09 Dec 2022 06:10  Patient On (Oxygen Delivery Method): room air      CONSTITUTIONAL: NAD, well-developed, well-groomed  EYES: PERRLA; conjunctiva and sclera clear  ENMT: Moist oral mucosa, no pharyngeal injection or exudates; normal dentition  NECK: Supple, no palpable masses; no thyromegaly  RESPIRATORY: Normal respiratory effort; lungs are clear to auscultation bilaterally  CARDIOVASCULAR: Regular rate and rhythm, normal S1 and S2, no murmur/rub/gallop; No lower extremity edema; Peripheral pulses are 2+ bilaterally  ABDOMEN: Nontender to palpation, normoactive bowel sounds, no rebound/guarding; No hepatosplenomegaly  MUSCULOSKELETAL:  Normal gait; no clubbing or cyanosis of digits; no joint swelling or tenderness to palpation  PSYCH: A+O to person, place, and time; affect appropriate  NEUROLOGY: CN 2-12 are intact and symmetric; no gross sensory deficits   SKIN: No rashes; no palpable lesions    LABS:                        12.4   2.17  )-----------( 175      ( 08 Dec 2022 06:26 )             39.1     12-09    134<L>  |  102  |  8   ----------------------------<  92  4.5   |  20<L>  |  0.76    Ca    8.9      09 Dec 2022 06:30  Phos  3.6     12-09  Mg     1.80     12-09        CARDIAC MARKERS ( 08 Dec 2022 06:26 )  x     / x     / 75 U/L / x     / x          Urinalysis Basic - ( 07 Dec 2022 18:53 )    Color: Light Yellow / Appearance: Clear / S.008 / pH: x  Gluc: x / Ketone: Negative  / Bili: Negative / Urobili: <2 mg/dL   Blood: x / Protein: Negative / Nitrite: Negative   Leuk Esterase: Negative / RBC: 0-2 /HPF / WBC 0-2 /HPF   Sq Epi: x / Non Sq Epi: x / Bacteria: Negative        Culture - Urine (collected 07 Dec 2022 18:53)  Source: Clean Catch Clean Catch (Midstream)  Final Report (09 Dec 2022 08:02):    <10,000 CFU/mL Normal Urogenital Jeimy    Culture - Blood (collected 07 Dec 2022 09:50)  Source: .Blood Blood-Venous  Preliminary Report (08 Dec 2022 13:02):    No growth to date.    Culture - Blood (collected 07 Dec 2022 09:35)  Source: .Blood Blood-Peripheral  Preliminary Report (08 Dec 2022 13:02):    No growth to date.      SARS-CoV-2: NotDetec (07 Dec 2022 08:35)  SARS-CoV-2: NotDetec (10 Nov 2022 09:30)  COVID-19 PCR: NotDetec (18 Sep 2022 22:39)  COVID-19 PCR: NotDetec (16 Sep 2022 06:48)  SARS-CoV-2: NotDetec (11 Sep 2022 23:31)  SARS-CoV-2: NotDetec (31 Aug 2022 05:46)  COVID-19 PCR: NotDetec (15 Aug 2022 09:10)  COVID-19 PCR: NotDetec (12 Aug 2022 18:30)  SARS-CoV-2: NotDetec (09 Aug 2022 04:13)      RADIOLOGY & ADDITIONAL TESTS:  New Results Reviewed Today:   New Imaging Personally Reviewed Today:  New Electrocardiogram Personally Reviewed Today:  Prior or Outpatient Records Reviewed Today:    COMMUNICATION:  Care Discussed with Consultants/Other Providers and Details of Discussion:  Discussions with Patient/Family:  PCP Communication:     [facility] of Hospital Medicine  Sigrid Naikkimberly RENE  Pager [phone]  Available via MS Teams    SUBJECTIVE / OVERNIGHT EVENTS: No acute events overnight. Patient still having weakness in legs, states left more than right. Denies any numbness or tingling     ADDITIONAL REVIEW OF SYSTEMS:    MEDICATIONS  (STANDING):  benzocaine 15 mG/menthol 3.6 mG Lozenge 1 Lozenge Oral once  bictegravir 50 mG/emtricitabine 200 mG/tenofovir alafenamide 25 mG (BIKTARVY) 1 Tablet(s) Oral daily  cyanocobalamin 1000 MICROGram(s) Oral daily  fluticasone propionate 50 MICROgram(s)/spray Nasal Spray 1 Spray(s) Both Nostrils two times a day  influenza   Vaccine 0.5 milliLiter(s) IntraMuscular once  nicotine -  14 mG/24Hr(s) Patch 1 Patch Transdermal daily  pregabalin 50 milliGRAM(s) Oral three times a day  QUEtiapine 100 milliGRAM(s) Oral daily  QUEtiapine 300 milliGRAM(s) Oral at bedtime    MEDICATIONS  (PRN):  acetaminophen     Tablet .. 650 milliGRAM(s) Oral every 6 hours PRN Temp greater or equal to 38C (100.4F), Mild Pain (1 - 3)  baclofen 5 milliGRAM(s) Oral every 8 hours PRN Muscle Spasm or pain  melatonin 3 milliGRAM(s) Oral at bedtime PRN Insomnia      I&O's Summary    08 Dec 2022 07:01  -  09 Dec 2022 07:00  --------------------------------------------------------  IN: 0 mL / OUT: 400 mL / NET: -400 mL        PHYSICAL EXAM:  Vital Signs Last 24 Hrs  T(C): 37.1 (09 Dec 2022 06:10), Max: 37.1 (09 Dec 2022 06:10)  T(F): 98.7 (09 Dec 2022 06:10), Max: 98.7 (09 Dec 2022 06:10)  HR: 57 (09 Dec 2022 06:10) (57 - 66)  BP: 109/62 (09 Dec 2022 06:10) (109/62 - 123/72)  BP(mean): --  RR: 17 (09 Dec 2022 06:10) (16 - 17)  SpO2: 100% (09 Dec 2022 06:10) (100% - 100%)    Parameters below as of 09 Dec 2022 06:10  Patient On (Oxygen Delivery Method): room air      CONSTITUTIONAL: NAD, well-developed, well-groomed  RESPIRATORY: Normal respiratory effort; lungs are clear to auscultation bilaterally  CARDIOVASCULAR: Regular rate and rhythm, normal S1 and S2, no murmur/rub/gallop; No lower extremity edema;   ABDOMEN: Nontender to palpation, normoactive bowel sounds, no rebound/guarding; No hepatosplenomegaly  MUSCULOSKELETAL:  Normal gait; no clubbing or cyanosis of digits; no joint swelling or tenderness to palpation  PSYCH: A+O to person, place, and time; affect appropriate  NEUROLOGY: CN 2-12 are intact and symmetric; no gross sensory deficits   SKIN: No rashes; no palpable lesions    LABS:                        12.4   2.17  )-----------( 175      ( 08 Dec 2022 06:26 )             39.1     12-09    134<L>  |  102  |  8   ----------------------------<  92  4.5   |  20<L>  |  0.76    Ca    8.9      09 Dec 2022 06:30  Phos  3.6     12-  Mg     1.80     12-09        CARDIAC MARKERS ( 08 Dec 2022 06:26 )  x     / x     / 75 U/L / x     / x          Urinalysis Basic - ( 07 Dec 2022 18:53 )    Color: Light Yellow / Appearance: Clear / S.008 / pH: x  Gluc: x / Ketone: Negative  / Bili: Negative / Urobili: <2 mg/dL   Blood: x / Protein: Negative / Nitrite: Negative   Leuk Esterase: Negative / RBC: 0-2 /HPF / WBC 0-2 /HPF   Sq Epi: x / Non Sq Epi: x / Bacteria: Negative        Culture - Urine (collected 07 Dec 2022 18:53)  Source: Clean Catch Clean Catch (Midstream)  Final Report (09 Dec 2022 08:02):    <10,000 CFU/mL Normal Urogenital Jeimy    Culture - Blood (collected 07 Dec 2022 09:50)  Source: .Blood Blood-Venous  Preliminary Report (08 Dec 2022 13:02):    No growth to date.    Culture - Blood (collected 07 Dec 2022 09:35)  Source: .Blood Blood-Peripheral  Preliminary Report (08 Dec 2022 13:02):    No growth to date.      SARS-CoV-2: NotDetec (07 Dec 2022 08:35)  SARS-CoV-2: NotDetec (10 Nov 2022 09:30)  COVID-19 PCR: NotDetec (18 Sep 2022 22:39)  COVID-19 PCR: NotDetec (16 Sep 2022 06:48)  SARS-CoV-2: NotDetec (11 Sep 2022 23:31)  SARS-CoV-2: NotDetec (31 Aug 2022 05:46)  COVID-19 PCR: NotDetec (15 Aug 2022 09:10)  COVID-19 PCR: NotDetec (12 Aug 2022 18:30)  SARS-CoV-2: NotDetec (09 Aug 2022 04:13)     [facility] Division of Hospital Medicine  Sigrid Naikkimberly RENE  Pager [phone]  Available via MS Teams    SUBJECTIVE / OVERNIGHT EVENTS: No acute events overnight. Patient still having weakness in legs, states left more than right. Denies any numbness or tingling     ADDITIONAL REVIEW OF SYSTEMS:    MEDICATIONS  (STANDING):  benzocaine 15 mG/menthol 3.6 mG Lozenge 1 Lozenge Oral once  bictegravir 50 mG/emtricitabine 200 mG/tenofovir alafenamide 25 mG (BIKTARVY) 1 Tablet(s) Oral daily  cyanocobalamin 1000 MICROGram(s) Oral daily  fluticasone propionate 50 MICROgram(s)/spray Nasal Spray 1 Spray(s) Both Nostrils two times a day  influenza   Vaccine 0.5 milliLiter(s) IntraMuscular once  nicotine -  14 mG/24Hr(s) Patch 1 Patch Transdermal daily  pregabalin 50 milliGRAM(s) Oral three times a day  QUEtiapine 100 milliGRAM(s) Oral daily  QUEtiapine 300 milliGRAM(s) Oral at bedtime    MEDICATIONS  (PRN):  acetaminophen     Tablet .. 650 milliGRAM(s) Oral every 6 hours PRN Temp greater or equal to 38C (100.4F), Mild Pain (1 - 3)  baclofen 5 milliGRAM(s) Oral every 8 hours PRN Muscle Spasm or pain  melatonin 3 milliGRAM(s) Oral at bedtime PRN Insomnia      I&O's Summary    08 Dec 2022 07:01  -  09 Dec 2022 07:00  --------------------------------------------------------  IN: 0 mL / OUT: 400 mL / NET: -400 mL        PHYSICAL EXAM:  Vital Signs Last 24 Hrs  T(C): 37.1 (09 Dec 2022 06:10), Max: 37.1 (09 Dec 2022 06:10)  T(F): 98.7 (09 Dec 2022 06:10), Max: 98.7 (09 Dec 2022 06:10)  HR: 57 (09 Dec 2022 06:10) (57 - 66)  BP: 109/62 (09 Dec 2022 06:10) (109/62 - 123/72)  BP(mean): --  RR: 17 (09 Dec 2022 06:10) (16 - 17)  SpO2: 100% (09 Dec 2022 06:10) (100% - 100%)    Parameters below as of 09 Dec 2022 06:10  Patient On (Oxygen Delivery Method): room air      CONSTITUTIONAL: NAD, well-developed, well-groomed  RESPIRATORY: Normal respiratory effort; lungs are clear to auscultation bilaterally  CARDIOVASCULAR: Regular rate and rhythm, normal S1 and S2, no murmur/rub/gallop; No lower extremity edema;   ABDOMEN: Nontender to palpation, normoactive bowel sounds, no rebound/guarding; No hepatosplenomegaly  MUSCULOSKELETAL:  Normal gait; no clubbing or cyanosis of digits; no joint swelling or tenderness to palpation  PSYCH: A+O to person, place, and time; affect appropriate  NEUROLOGY: CN 2-12 are intact and symmetric; no gross sensory deficits, 3/5 strength in LE bilaterally   SKIN: No rashes; no palpable lesions    LABS:                        12.4   2.17  )-----------( 175      ( 08 Dec 2022 06:26 )             39.1     12-09    134<L>  |  102  |  8   ----------------------------<  92  4.5   |  20<L>  |  0.76    Ca    8.9      09 Dec 2022 06:30  Phos  3.6     12-09  Mg     1.80     12-09        CARDIAC MARKERS ( 08 Dec 2022 06:26 )  x     / x     / 75 U/L / x     / x          Urinalysis Basic - ( 07 Dec 2022 18:53 )    Color: Light Yellow / Appearance: Clear / S.008 / pH: x  Gluc: x / Ketone: Negative  / Bili: Negative / Urobili: <2 mg/dL   Blood: x / Protein: Negative / Nitrite: Negative   Leuk Esterase: Negative / RBC: 0-2 /HPF / WBC 0-2 /HPF   Sq Epi: x / Non Sq Epi: x / Bacteria: Negative        Culture - Urine (collected 07 Dec 2022 18:53)  Source: Clean Catch Clean Catch (Midstream)  Final Report (09 Dec 2022 08:02):    <10,000 CFU/mL Normal Urogenital Jeimy    Culture - Blood (collected 07 Dec 2022 09:50)  Source: .Blood Blood-Venous  Preliminary Report (08 Dec 2022 13:02):    No growth to date.    Culture - Blood (collected 07 Dec 2022 09:35)  Source: .Blood Blood-Peripheral  Preliminary Report (08 Dec 2022 13:02):    No growth to date.      SARS-CoV-2: NotDetec (07 Dec 2022 08:35)  SARS-CoV-2: NotDetec (10 Nov 2022 09:30)  COVID-19 PCR: NotDetec (18 Sep 2022 22:39)  COVID-19 PCR: NotDetec (16 Sep 2022 06:48)  SARS-CoV-2: NotDetec (11 Sep 2022 23:31)  SARS-CoV-2: NotDetec (31 Aug 2022 05:46)  COVID-19 PCR: NotDetec (15 Aug 2022 09:10)  COVID-19 PCR: NotDetec (12 Aug 2022 18:30)  SARS-CoV-2: NotDetec (09 Aug 2022 04:13)

## 2022-12-09 NOTE — PROGRESS NOTE ADULT - PROBLEM SELECTOR PLAN 1
- B/l LE weakness, hyperreflexia, spasticity, positive Babinski concerning for myelopathy possibly from HIV.  - Neurology called; Given multiple prior MRIs of the spine and progression of prior symptoms over a span of 4 years, can defer obtaining MRI for now  - PT/OT/SW eval for dispo  - Continue Home medications for pain  - B12, MMA, homocysteine, folate, CK, ESR, CRP, HIV labs (VL, CD4 count), A1c,   - Outpatient follow up with Dr Raman Tapia or Dr Des Garcia or if insurance is an issue may go to Neuro clinic  at [facility] 4th floor  - Vitamin b12 1000mg qD - B/l LE weakness, hyperreflexia, spasticity, positive Babinski concerning for myelopathy possibly from HIV; able to move all extremities, strength 3/5 strength in LE bilaterally    - Neurology called; Given multiple prior MRIs of the spine and progression of prior symptoms over a span of 4 years, can defer obtaining MRI for now  - PT/OT/SW eval for dispo  - Continue Home medications for pain  - Vit B12 nl, folate nl.   - B12 nl, folate nl , CK nl , ESR nl, CRP nl,   - Outpatient follow up with Dr Raman Tapia or Dr Des Garcia or if insurance is an issue may go to Neuro clinic  at [facility] 4th floor  - Vitamin b12 1000mg qD

## 2022-12-09 NOTE — PROGRESS NOTE ADULT - PROBLEM SELECTOR PLAN 3
#Lovenox for DVT prevention    #Social Disposition issue: patient received from shelter, previously offered SNF but declined; at this time is amenable to placement in SNF as he is unable to take care of himself. DVT ppx: Lovenox     #Social Disposition issue: patient received from shelter, previously offered SNF but declined; at this time is amenable to placement in SNF as he is unable to take care of himself. DVT ppx: Lovenox   Diet: Regular Diet   Dispo: patient received from shelter, previously offered SNF but declined; at this time is amenable to placement in SNF as he is unable to take care of himself.

## 2022-12-09 NOTE — PROGRESS NOTE ADULT - ASSESSMENT
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 No plans for MRI per Neuro as patient with multiple studies in the past.  Now with elevated VL and low CD4 count.

## 2022-12-09 NOTE — PROGRESS NOTE ADULT - PROBLEM SELECTOR PLAN 2
- Viral load you elevated at [phone] with CD4 count 145   - concern for noncompliance with medications  - will consult ID today given high VL  - continue with home HARRT medications  - given CD4 count   - Compliance continued to be reinforced with patient.    #Recent COVID Infection - per infection control guidelines should maintain isolation for ~21 days following infection (initial positive on 11/22) - Currently testing negative - Viral load now elevated at [phone] with CD4 count 145   - concern for noncompliance with medications  - will consult ID today given high VL  - continue with home HARRT medications  - given CD4 count will start on Bactrim for PCP ppx    - Compliance will continued to be reinforced with patient.    #Recent COVID Infection - per infection control guidelines should maintain isolation for ~21 days following infection (initial positive on 11/22) - Currently testing negative - Viral load now elevated at [phone] with CD4 count 145   - concern for noncompliance with medications  - spoke with ID Dr. Hercules, patient well known to service and patient is non-compliant with medications and does not followup with ID; states that patient will need repeat VL in 2 weeks   - continue with home HARRT medications  - given CD4 count will start on Bactrim for PCP ppx    - Compliance will continued to be reinforced with patient.    #Recent COVID Infection - per infection control guidelines should maintain isolation for ~21 days following infection (initial positive on 11/22) - Currently testing negative

## 2022-12-09 NOTE — CONSULT NOTE ADULT - ASSESSMENT
32 year old living with HIV, presents with lower extremity weakness.     1) HIV  Ptt states he wants to follow up with Dr. Hunter but has been unable  [facility]    He is on Biktarvy with a high viral load.  I am concerned about adherence.  Initially he stated he only missed taking his Biktarvy since wednesday, then he states he has missed a few doses but not a lot.    genosure sent in 8/2022 - wound not predict resistance to Biktarvy    CD4 less than 200 and viral load at 391 K    I would continue biktarvy at this time.  Repeat a viral load in 2 weeks.  If VL is not better at that time, would repeat genosure    Bactrim DS 3x/ week on Mon/ Wed/ Friday    2) COVID  notes mild uri symptoms    3) Lower extremity weakness  Pt has multiple prior admissions with LE weakness  There has been concern for HIV myelopathy in the past.   During prior admissions, his LE strength has improved over a few days without explanation  ? component of behavioral health  Monitor closely for change in clinical status    See ama note from 9/15/2022    4) Denies substance use  tox screen positive for cocaine    Follow up with ID as an outpt [phone]

## 2022-12-09 NOTE — CONSULT NOTE ADULT - SUBJECTIVE AND OBJECTIVE BOX
HPI:  Patient is a 31 yo male PMHx of congenital HIV c myelopathy, GBS, and recently resolved COVID infection presenting with leg weakness bilaterally. States that symptoms started 3-4 days ago when he got up in the morning and started feeling ill. Reports having a sore throat, cough, and runny nose when symptoms started. Expressed that when he went to sleep, he would wake up to find that he urinated on himself. States that he has reduced the amount he drinks out of fear of urinating. Explains that he goes to the bathroom 3 times a day. Expresses that there is also bilateral back pain associated with symptoms located in the bottom sacroiliac regions bilaterally. Ranks that pain a 7/10 in severity. Patient claims he has not taken anything for the pain and movement makes back pain worse. Denies any fever, chills, nausea, vomiting, weight loss, weight gain, SOB, ESCUDERO, paralysis, and saddle anesthesia. (07 Dec 2022 14:12)    At this time, moves all extremities.       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      Asthma      Closed fracture of multiple ribs of right side, initial encounter      Cocaine abuse      Chronic sinusitis      Homeless      GBS (Guillain Pioneer syndrome)      Prophylactic measure      History of orthopedic surgery  left arm          Allergies    Ceclor (Unknown)  Toradol (Anaphylaxis; Swelling)  Toradol (Swelling)    Intolerances        ANTIMICROBIALS:  bictegravir 50 mG/emtricitabine 200 mG/tenofovir alafenamide 25 mG (BIKTARVY) 1 daily  trimethoprim  160 mG/sulfamethoxazole 800 mG 1 <User Schedule>      OTHER MEDS:  acetaminophen     Tablet .. 650 milliGRAM(s) Oral every 6 hours PRN  baclofen 5 milliGRAM(s) Oral every 8 hours PRN  cyanocobalamin 1000 MICROGram(s) Oral daily  enoxaparin Injectable 40 milliGRAM(s) SubCutaneous every 24 hours  fluticasone propionate 50 MICROgram(s)/spray Nasal Spray 1 Spray(s) Both Nostrils two times a day  influenza   Vaccine 0.5 milliLiter(s) IntraMuscular once  melatonin 3 milliGRAM(s) Oral at bedtime PRN  nicotine -  14 mG/24Hr(s) Patch 1 Patch Transdermal daily  pregabalin 50 milliGRAM(s) Oral three times a day  QUEtiapine 100 milliGRAM(s) Oral daily  QUEtiapine 300 milliGRAM(s) Oral at bedtime      SOCIAL HISTORY:  unstable housing  Denies drug use- (+ tox screen)    FAMILY HISTORY:  FH: HIV infection (Mother)        REVIEW OF SYSTEMS  [  ] ROS unobtainable because:    [ x ] All other systems negative except as noted below:	    Constitutional:  [ ] fever [ ] chills  [ ] weight loss  x[ ] weakness  Skin:  [ ] rash [ ] phlebitis	  Eyes: [ ] icterus [ ] pain  [ ] discharge	  ENMT: [ ] sore throat  [ ] thrush [ ] ulcers [ x] congestion  Respiratory: [ ] dyspnea [ ] hemoptysis [ ] cough [ ] sputum	  Cardiovascular:  [ ] chest pain [ ] palpitations [ ] edema	  Gastrointestinal:  [ ] nausea [ ] vomiting [ ] diarrhea [ ] constipation [ ] pain	  Genitourinary:  [ ] dysuria [ ] frequency [ ] hematuria [ ] discharge [ ] flank pain  [ ] incontinence  Musculoskeletal:  [ ] myalgias [ ] arthralgias [ ] arthritis  [ ] back pain  Neurological:  [ ] headache [ ] seizures  [ ] confusion/altered mental status  Psychiatric:  [ ] anxiety [ ] depression	  Hematology/Lymphatics:  [ ] lymphadenopathy  Endocrine:  [ ] adrenal [ ] thyroid  Allergic/Immunologic:	 [ ] transplant [ ] seasonal    PHYSICAL EXAM:  General: [x ] non-toxic  HEAD/EYES: [ ] PERRL [x ] white sclera [ ] icterus  ENT:  [ ] normal x[ ] supple [ ] thrush [ ] pharyngeal exudate  Cardiovascular:   [ ] murmur [ x] normal [ ] PPM/AICD  Respiratory:  [ x] clear to ausculation bilaterally  GI:  [ x] soft, non-tender, normal bowel sounds  :  [ ] gonzlaes [ ] no CVA tenderness   Musculoskeletal:  [ ] no synovitis  Neurologic:  [ ] non-focal exam   Skin:  [x ] no rash  Lymph: x[ x] no lymphadenopathy  Psychiatric:  [ ] appropriate affect [ x] alert & oriented  Lines:  [x ] no phlebitis [ ] central line          Drug Dosing Weight  Height (cm): 182.9 (07 Dec 2022 06:09)  Weight (kg): 81.7 (07 Dec 2022 12:45)  BMI (kg/m2): 24.4 (07 Dec 2022 12:45)  BSA (m2): 2.04 (07 Dec 2022 12:45)    Vital Signs Last 24 Hrs  T(F): 98.7 (12-09-22 @ 06:10), Max: 98.7 (22 @ 06:10)    Vital Signs Last 24 Hrs  HR: 57 (22 @ 06:10) (57 - 57)  BP: 109/62 (22 @ 06:10) (109/62 - 123/72)  RR: 17 (22 @ 06:10)  SpO2: 100% (22 @ 06:10) (100% - 100%)  Wt(kg): --                          12.4   2.17  )-----------( 175      ( 08 Dec 2022 06:26 )             39.1           134<L>  |  102  |  8   ----------------------------<  92  4.5   |  20<L>  |  0.76    Ca    8.9      09 Dec 2022 06:30  Phos  3.6       Mg     1.80             Urinalysis Basic - ( 07 Dec 2022 18:53 )    Color: Light Yellow / Appearance: Clear / S.008 / pH: x  Gluc: x / Ketone: Negative  / Bili: Negative / Urobili: <2 mg/dL   Blood: x / Protein: Negative / Nitrite: Negative   Leuk Esterase: Negative / RBC: 0-2 /HPF / WBC 0-2 /HPF   Sq Epi: x / Non Sq Epi: x / Bacteria: Negative        MICROBIOLOGY:    RADIOLOGY:

## 2022-12-10 LAB
ANION GAP SERPL CALC-SCNC: 8 MMOL/L — SIGNIFICANT CHANGE UP (ref 7–14)
BASOPHILS # BLD AUTO: 0.01 K/UL — SIGNIFICANT CHANGE UP (ref 0–0.2)
BASOPHILS NFR BLD AUTO: 0.3 % — SIGNIFICANT CHANGE UP (ref 0–2)
BUN SERPL-MCNC: 11 MG/DL — SIGNIFICANT CHANGE UP (ref 7–23)
CALCIUM SERPL-MCNC: 9.1 MG/DL — SIGNIFICANT CHANGE UP (ref 8.4–10.5)
CHLORIDE SERPL-SCNC: 106 MMOL/L — SIGNIFICANT CHANGE UP (ref 98–107)
CO2 SERPL-SCNC: 23 MMOL/L — SIGNIFICANT CHANGE UP (ref 22–31)
CREAT SERPL-MCNC: 0.88 MG/DL — SIGNIFICANT CHANGE UP (ref 0.5–1.3)
EGFR: 116 ML/MIN/1.73M2 — SIGNIFICANT CHANGE UP
EOSINOPHIL # BLD AUTO: 0.05 K/UL — SIGNIFICANT CHANGE UP (ref 0–0.5)
EOSINOPHIL NFR BLD AUTO: 1.6 % — SIGNIFICANT CHANGE UP (ref 0–6)
GLUCOSE SERPL-MCNC: 89 MG/DL — SIGNIFICANT CHANGE UP (ref 70–99)
HCT VFR BLD CALC: 38.5 % — LOW (ref 39–50)
HGB BLD-MCNC: 12.1 G/DL — LOW (ref 13–17)
IANC: 0.52 K/UL — LOW (ref 1.8–7.4)
IMM GRANULOCYTES NFR BLD AUTO: 0 % — SIGNIFICANT CHANGE UP (ref 0–0.9)
LYMPHOCYTES # BLD AUTO: 2.24 K/UL — SIGNIFICANT CHANGE UP (ref 1–3.3)
LYMPHOCYTES # BLD AUTO: 72.7 % — HIGH (ref 13–44)
MAGNESIUM SERPL-MCNC: 1.6 MG/DL — SIGNIFICANT CHANGE UP (ref 1.6–2.6)
MCHC RBC-ENTMCNC: 28.7 PG — SIGNIFICANT CHANGE UP (ref 27–34)
MCHC RBC-ENTMCNC: 31.4 GM/DL — LOW (ref 32–36)
MCV RBC AUTO: 91.4 FL — SIGNIFICANT CHANGE UP (ref 80–100)
MONOCYTES # BLD AUTO: 0.26 K/UL — SIGNIFICANT CHANGE UP (ref 0–0.9)
MONOCYTES NFR BLD AUTO: 8.4 % — SIGNIFICANT CHANGE UP (ref 2–14)
NEUTROPHILS # BLD AUTO: 0.52 K/UL — LOW (ref 1.8–7.4)
NEUTROPHILS NFR BLD AUTO: 17 % — LOW (ref 43–77)
NRBC # BLD: 0 /100 WBCS — SIGNIFICANT CHANGE UP (ref 0–0)
NRBC # FLD: 0 K/UL — SIGNIFICANT CHANGE UP (ref 0–0)
PHOSPHATE SERPL-MCNC: 2.9 MG/DL — SIGNIFICANT CHANGE UP (ref 2.5–4.5)
PLATELET # BLD AUTO: 180 K/UL — SIGNIFICANT CHANGE UP (ref 150–400)
POTASSIUM SERPL-MCNC: 4.2 MMOL/L — SIGNIFICANT CHANGE UP (ref 3.5–5.3)
POTASSIUM SERPL-SCNC: 4.2 MMOL/L — SIGNIFICANT CHANGE UP (ref 3.5–5.3)
RBC # BLD: 4.21 M/UL — SIGNIFICANT CHANGE UP (ref 4.2–5.8)
RBC # FLD: 13.5 % — SIGNIFICANT CHANGE UP (ref 10.3–14.5)
SODIUM SERPL-SCNC: 137 MMOL/L — SIGNIFICANT CHANGE UP (ref 135–145)
WBC # BLD: 3.08 K/UL — LOW (ref 3.8–10.5)
WBC # FLD AUTO: 3.08 K/UL — LOW (ref 3.8–10.5)

## 2022-12-10 PROCEDURE — 99232 SBSQ HOSP IP/OBS MODERATE 35: CPT

## 2022-12-10 RX ADMIN — Medication 50 MILLIGRAM(S): at 05:48

## 2022-12-10 RX ADMIN — Medication 50 MILLIGRAM(S): at 13:35

## 2022-12-10 RX ADMIN — QUETIAPINE FUMARATE 300 MILLIGRAM(S): 200 TABLET, FILM COATED ORAL at 22:48

## 2022-12-10 RX ADMIN — Medication 5 MILLIGRAM(S): at 12:49

## 2022-12-10 RX ADMIN — PREGABALIN 1000 MICROGRAM(S): 225 CAPSULE ORAL at 12:47

## 2022-12-10 RX ADMIN — BICTEGRAVIR SODIUM, EMTRICITABINE, AND TENOFOVIR ALAFENAMIDE FUMARATE 1 TABLET(S): 30; 120; 15 TABLET ORAL at 12:47

## 2022-12-10 RX ADMIN — Medication 1 SPRAY(S): at 05:48

## 2022-12-10 RX ADMIN — Medication 50 MILLIGRAM(S): at 21:29

## 2022-12-10 RX ADMIN — QUETIAPINE FUMARATE 100 MILLIGRAM(S): 200 TABLET, FILM COATED ORAL at 12:47

## 2022-12-10 NOTE — PROGRESS NOTE ADULT - PROBLEM SELECTOR PLAN 3
DVT ppx: Lovenox   Diet: Regular Diet   Dispo: patient received from shelter, previously offered SNF but declined; at this time is amenable to placement in SNF as he is unable to take care of himself.

## 2022-12-10 NOTE — PROGRESS NOTE ADULT - SUBJECTIVE AND OBJECTIVE BOX
[facility] Division of [facility] Medicine  Sigrid Olivares M.D  Pager [phone]  Available via MS Teams    SUBJECTIVE / OVERNIGHT EVENTS: No acute events overnight.     ADDITIONAL REVIEW OF SYSTEMS:    MEDICATIONS  (STANDING):  bictegravir 50 mG/emtricitabine 200 mG/tenofovir alafenamide 25 mG (BIKTARVY) 1 Tablet(s) Oral daily  cyanocobalamin 1000 MICROGram(s) Oral daily  enoxaparin Injectable 40 milliGRAM(s) SubCutaneous every 24 hours  fluticasone propionate 50 MICROgram(s)/spray Nasal Spray 1 Spray(s) Both Nostrils two times a day  influenza   Vaccine 0.5 milliLiter(s) IntraMuscular once  nicotine -  14 mG/24Hr(s) Patch 1 Patch Transdermal daily  pregabalin 50 milliGRAM(s) Oral three times a day  QUEtiapine 100 milliGRAM(s) Oral daily  QUEtiapine 300 milliGRAM(s) Oral at bedtime  trimethoprim  160 mG/sulfamethoxazole 800 mG 1 Tablet(s) Oral <User Schedule>    MEDICATIONS  (PRN):  acetaminophen     Tablet .. 650 milliGRAM(s) Oral every 6 hours PRN Temp greater or equal to 38C (100.4F), Mild Pain (1 - 3)  baclofen 5 milliGRAM(s) Oral every 8 hours PRN Muscle Spasm or pain  melatonin 3 milliGRAM(s) Oral at bedtime PRN Insomnia      I&O's Summary    09 Dec 2022 07:01  -  10 Dec 2022 07:00  --------------------------------------------------------  IN: 0 mL / OUT: 250 mL / NET: -250 mL        PHYSICAL EXAM:  Vital Signs Last 24 Hrs  T(C): 36.6 (10 Dec 2022 05:50), Max: 36.7 (09 Dec 2022 17:23)  T(F): 97.9 (10 Dec 2022 05:50), Max: 98.1 (09 Dec 2022 17:23)  HR: 65 (10 Dec 2022 05:50) (60 - 65)  BP: 113/65 (10 Dec 2022 05:50) (110/67 - 121/63)  BP(mean): --  RR: 18 (10 Dec 2022 05:50) (16 - 18)  SpO2: 99% (10 Dec 2022 05:50) (99% - 100%)    Parameters below as of 10 Dec 2022 05:50  Patient On (Oxygen Delivery Method): room air      CONSTITUTIONAL: NAD, well-developed, well-groomed  RESPIRATORY: Normal respiratory effort; lungs are clear to auscultation bilaterally  CARDIOVASCULAR: Regular rate and rhythm, normal S1 and S2, no murmur/rub/gallop; No lower extremity edema; Peripheral pulses are 2+ bilaterally  ABDOMEN: Nontender to palpation, normoactive bowel sounds, no rebound/guarding; No hepatosplenomegaly  MUSCULOSKELETAL:  Normal gait; no clubbing or cyanosis of digits; no joint swelling or tenderness to palpation  PSYCH: A+O to person, place, and time; affect appropriate  NEUROLOGY: CN 2-12 are intact and symmetric; no gross sensory deficits   SKIN: No rashes; no palpable lesions    LABS:                        12.1   3.08  )-----------( 180      ( 10 Dec 2022 07:20 )             38.5     12-10    137  |  106  |  11  ----------------------------<  89  4.2   |  23  |  0.88    Ca    9.1      10 Dec 2022 07:20  Phos  2.9     12-10  Mg     1.60     12-10                Culture - Urine (collected 07 Dec 2022 18:53)  Source: Clean Catch Clean Catch (Midstream)  Final Report (09 Dec 2022 08:02):    <10,000 CFU/mL Normal Urogenital Jeimy    Culture - Blood (collected 07 Dec 2022 09:50)  Source: .Blood Blood-Venous  Preliminary Report (08 Dec 2022 13:02):    No growth to date.    Culture - Blood (collected 07 Dec 2022 09:35)  Source: .Blood Blood-Peripheral  Preliminary Report (08 Dec 2022 13:02):    No growth to date.      SARS-CoV-2: NotDetec (07 Dec 2022 08:35)  SARS-CoV-2: NotDetec (10 Nov 2022 09:30)  COVID-19 PCR: NotDetec (18 Sep 2022 22:39)  COVID-19 PCR: NotDetec (16 Sep 2022 06:48)  SARS-CoV-2: NotDetec (11 Sep 2022 23:31)  SARS-CoV-2: NotDetec (31 Aug 2022 05:46)  COVID-19 PCR: NotDetec (15 Aug 2022 09:10)  COVID-19 PCR: NotDetec (12 Aug 2022 18:30)  SARS-CoV-2: NotDetec (09 Aug 2022 04:13)       [facility] of [facility] Medicine  Sigrid Naikkimberly RENE  Pager [phone]  Available via MS Teams    SUBJECTIVE / OVERNIGHT EVENTS: No acute events overnight. Endorsed spasms this overnight.     ADDITIONAL REVIEW OF SYSTEMS:    MEDICATIONS  (STANDING):  bictegravir 50 mG/emtricitabine 200 mG/tenofovir alafenamide 25 mG (BIKTARVY) 1 Tablet(s) Oral daily  cyanocobalamin 1000 MICROGram(s) Oral daily  enoxaparin Injectable 40 milliGRAM(s) SubCutaneous every 24 hours  fluticasone propionate 50 MICROgram(s)/spray Nasal Spray 1 Spray(s) Both Nostrils two times a day  influenza   Vaccine 0.5 milliLiter(s) IntraMuscular once  nicotine -  14 mG/24Hr(s) Patch 1 Patch Transdermal daily  pregabalin 50 milliGRAM(s) Oral three times a day  QUEtiapine 100 milliGRAM(s) Oral daily  QUEtiapine 300 milliGRAM(s) Oral at bedtime  trimethoprim  160 mG/sulfamethoxazole 800 mG 1 Tablet(s) Oral <User Schedule>    MEDICATIONS  (PRN):  acetaminophen     Tablet .. 650 milliGRAM(s) Oral every 6 hours PRN Temp greater or equal to 38C (100.4F), Mild Pain (1 - 3)  baclofen 5 milliGRAM(s) Oral every 8 hours PRN Muscle Spasm or pain  melatonin 3 milliGRAM(s) Oral at bedtime PRN Insomnia      I&O's Summary    09 Dec 2022 07:01  -  10 Dec 2022 07:00  --------------------------------------------------------  IN: 0 mL / OUT: 250 mL / NET: -250 mL        PHYSICAL EXAM:  Vital Signs Last 24 Hrs  T(C): 36.6 (10 Dec 2022 05:50), Max: 36.7 (09 Dec 2022 17:23)  T(F): 97.9 (10 Dec 2022 05:50), Max: 98.1 (09 Dec 2022 17:23)  HR: 65 (10 Dec 2022 05:50) (60 - 65)  BP: 113/65 (10 Dec 2022 05:50) (110/67 - 121/63)  BP(mean): --  RR: 18 (10 Dec 2022 05:50) (16 - 18)  SpO2: 99% (10 Dec 2022 05:50) (99% - 100%)    Parameters below as of 10 Dec 2022 05:50  Patient On (Oxygen Delivery Method): room air      CONSTITUTIONAL: NAD, well-developed, well-groomed  RESPIRATORY: Normal respiratory effort; lungs are clear to auscultation bilaterally  CARDIOVASCULAR: Regular rate and rhythm, normal S1 and S2, no murmur/rub/gallop; No lower extremity edema; Peripheral pulses are 2+ bilaterally  ABDOMEN: Nontender to palpation, normoactive bowel sounds, no rebound/guarding; No hepatosplenomegaly  MUSCULOSKELETAL:  Normal gait; no clubbing or cyanosis of digits; no joint swelling or tenderness to palpation  PSYCH: A+O to person, place, and time; affect appropriate  NEUROLOGY: CN 2-12 are intact and symmetric; no gross sensory deficits   SKIN: No rashes; no palpable lesions    LABS:                        12.1   3.08  )-----------( 180      ( 10 Dec 2022 07:20 )             38.5     12-10    137  |  106  |  11  ----------------------------<  89  4.2   |  23  |  0.88    Ca    9.1      10 Dec 2022 07:20  Phos  2.9     12-10  Mg     1.60     12-10                Culture - Urine (collected 07 Dec 2022 18:53)  Source: Clean Catch Clean Catch (Midstream)  Final Report (09 Dec 2022 08:02):    <10,000 CFU/mL Normal Urogenital Jeimy    Culture - Blood (collected 07 Dec 2022 09:50)  Source: .Blood Blood-Venous  Preliminary Report (08 Dec 2022 13:02):    No growth to date.    Culture - Blood (collected 07 Dec 2022 09:35)  Source: .Blood Blood-Peripheral  Preliminary Report (08 Dec 2022 13:02):    No growth to date.      SARS-CoV-2: NotDetec (07 Dec 2022 08:35)  SARS-CoV-2: NotDetec (10 Nov 2022 09:30)  COVID-19 PCR: NotDetec (18 Sep 2022 22:39)  COVID-19 PCR: NotDetec (16 Sep 2022 06:48)  SARS-CoV-2: NotDetec (11 Sep 2022 23:31)  SARS-CoV-2: NotDetec (31 Aug 2022 05:46)  COVID-19 PCR: NotDetec (15 Aug 2022 09:10)  COVID-19 PCR: NotDetec (12 Aug 2022 18:30)  SARS-CoV-2: NotDetec (09 Aug 2022 04:13)

## 2022-12-10 NOTE — PROGRESS NOTE ADULT - PROBLEM SELECTOR PLAN 1
- B/l LE weakness, hyperreflexia, spasticity, positive Babinski concerning for myelopathy possibly from HIV; able to move all extremities, strength 3/5 strength in LE bilaterally    - Neurology called; Given multiple prior MRIs of the spine and progression of prior symptoms over a span of 4 years, can defer obtaining MRI for now  - PT/OT/SW eval for dispo  - Continue Home medications for pain  - Vit B12 nl, folate nl.   - B12 nl, folate nl , CK nl , ESR nl, CRP nl,   - Outpatient follow up with Dr Raman Tapia or Dr Des Garcia or if insurance is an issue may go to Neuro clinic  at [facility] 4th floor  - Vitamin b12 1000mg qD

## 2022-12-10 NOTE — PROGRESS NOTE ADULT - PROBLEM SELECTOR PLAN 2
- Viral load now elevated at [phone] with CD4 count 145   - concern for noncompliance with medications  - spoke with ID Dr. Hercules, patient well known to service and patient is non-compliant with medications and does not followup with ID; states that patient will need repeat VL in 2 weeks; if VL still remains elevated will obtain genosure at that time  - continue with home HARRT medications; Biktarvy  - given CD4 count will start on Bactrim for PCP ppx    - Compliance will continued to be reinforced with patient.    #Recent COVID Infection - per infection control guidelines should maintain isolation for ~21 days following infection (initial positive on 11/22) - Currently testing negative

## 2022-12-10 NOTE — PROGRESS NOTE ADULT - ASSESSMENT
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 No plans for MRI per Neuro as patient with multiple studies in the past.  Now with elevated VL and low CD4 count.

## 2022-12-11 LAB
ANION GAP SERPL CALC-SCNC: 12 MMOL/L — SIGNIFICANT CHANGE UP (ref 7–14)
BASOPHILS # BLD AUTO: 0.01 K/UL — SIGNIFICANT CHANGE UP (ref 0–0.2)
BASOPHILS NFR BLD AUTO: 0.3 % — SIGNIFICANT CHANGE UP (ref 0–2)
BUN SERPL-MCNC: 14 MG/DL — SIGNIFICANT CHANGE UP (ref 7–23)
CALCIUM SERPL-MCNC: 9.1 MG/DL — SIGNIFICANT CHANGE UP (ref 8.4–10.5)
CHLORIDE SERPL-SCNC: 103 MMOL/L — SIGNIFICANT CHANGE UP (ref 98–107)
CO2 SERPL-SCNC: 22 MMOL/L — SIGNIFICANT CHANGE UP (ref 22–31)
CREAT SERPL-MCNC: 0.85 MG/DL — SIGNIFICANT CHANGE UP (ref 0.5–1.3)
EGFR: 118 ML/MIN/1.73M2 — SIGNIFICANT CHANGE UP
EOSINOPHIL # BLD AUTO: 0.06 K/UL — SIGNIFICANT CHANGE UP (ref 0–0.5)
EOSINOPHIL NFR BLD AUTO: 1.9 % — SIGNIFICANT CHANGE UP (ref 0–6)
GLUCOSE SERPL-MCNC: 94 MG/DL — SIGNIFICANT CHANGE UP (ref 70–99)
HCT VFR BLD CALC: 38.4 % — LOW (ref 39–50)
HGB BLD-MCNC: 12.1 G/DL — LOW (ref 13–17)
IANC: 0.47 K/UL — LOW (ref 1.8–7.4)
IMM GRANULOCYTES NFR BLD AUTO: 0.3 % — SIGNIFICANT CHANGE UP (ref 0–0.9)
LYMPHOCYTES # BLD AUTO: 2.37 K/UL — SIGNIFICANT CHANGE UP (ref 1–3.3)
LYMPHOCYTES # BLD AUTO: 73.6 % — HIGH (ref 13–44)
MAGNESIUM SERPL-MCNC: 1.3 MG/DL — LOW (ref 1.6–2.6)
MCHC RBC-ENTMCNC: 28.8 PG — SIGNIFICANT CHANGE UP (ref 27–34)
MCHC RBC-ENTMCNC: 31.5 GM/DL — LOW (ref 32–36)
MCV RBC AUTO: 91.4 FL — SIGNIFICANT CHANGE UP (ref 80–100)
MONOCYTES # BLD AUTO: 0.3 K/UL — SIGNIFICANT CHANGE UP (ref 0–0.9)
MONOCYTES NFR BLD AUTO: 9.3 % — SIGNIFICANT CHANGE UP (ref 2–14)
NEUTROPHILS # BLD AUTO: 0.47 K/UL — LOW (ref 1.8–7.4)
NEUTROPHILS NFR BLD AUTO: 14.6 % — LOW (ref 43–77)
NRBC # BLD: 0 /100 WBCS — SIGNIFICANT CHANGE UP (ref 0–0)
NRBC # FLD: 0 K/UL — SIGNIFICANT CHANGE UP (ref 0–0)
PHOSPHATE SERPL-MCNC: 3.5 MG/DL — SIGNIFICANT CHANGE UP (ref 2.5–4.5)
PLATELET # BLD AUTO: 196 K/UL — SIGNIFICANT CHANGE UP (ref 150–400)
POTASSIUM SERPL-MCNC: 3.9 MMOL/L — SIGNIFICANT CHANGE UP (ref 3.5–5.3)
POTASSIUM SERPL-SCNC: 3.9 MMOL/L — SIGNIFICANT CHANGE UP (ref 3.5–5.3)
RBC # BLD: 4.2 M/UL — SIGNIFICANT CHANGE UP (ref 4.2–5.8)
RBC # FLD: 13.5 % — SIGNIFICANT CHANGE UP (ref 10.3–14.5)
SODIUM SERPL-SCNC: 137 MMOL/L — SIGNIFICANT CHANGE UP (ref 135–145)
WBC # BLD: 3.22 K/UL — LOW (ref 3.8–10.5)
WBC # FLD AUTO: 3.22 K/UL — LOW (ref 3.8–10.5)

## 2022-12-11 PROCEDURE — 99232 SBSQ HOSP IP/OBS MODERATE 35: CPT

## 2022-12-11 RX ADMIN — BICTEGRAVIR SODIUM, EMTRICITABINE, AND TENOFOVIR ALAFENAMIDE FUMARATE 1 TABLET(S): 30; 120; 15 TABLET ORAL at 14:02

## 2022-12-11 RX ADMIN — Medication 50 MILLIGRAM(S): at 23:08

## 2022-12-11 RX ADMIN — QUETIAPINE FUMARATE 300 MILLIGRAM(S): 200 TABLET, FILM COATED ORAL at 23:08

## 2022-12-11 RX ADMIN — Medication 1 SPRAY(S): at 05:34

## 2022-12-11 RX ADMIN — Medication 50 MILLIGRAM(S): at 05:33

## 2022-12-11 RX ADMIN — PREGABALIN 1000 MICROGRAM(S): 225 CAPSULE ORAL at 11:34

## 2022-12-11 RX ADMIN — Medication 50 MILLIGRAM(S): at 14:08

## 2022-12-11 RX ADMIN — QUETIAPINE FUMARATE 100 MILLIGRAM(S): 200 TABLET, FILM COATED ORAL at 14:01

## 2022-12-11 NOTE — PROGRESS NOTE ADULT - SUBJECTIVE AND OBJECTIVE BOX
[facility] Division of Hospital Medicine  Sigrid Naikkimberly RENE  Pager [phone]  Available via MS Teams    SUBJECTIVE / OVERNIGHT EVENTS: No acute events overnight.    ADDITIONAL REVIEW OF SYSTEMS:    MEDICATIONS  (STANDING):  bictegravir 50 mG/emtricitabine 200 mG/tenofovir alafenamide 25 mG (BIKTARVY) 1 Tablet(s) Oral daily  cyanocobalamin 1000 MICROGram(s) Oral daily  enoxaparin Injectable 40 milliGRAM(s) SubCutaneous every 24 hours  fluticasone propionate 50 MICROgram(s)/spray Nasal Spray 1 Spray(s) Both Nostrils two times a day  influenza   Vaccine 0.5 milliLiter(s) IntraMuscular once  nicotine -  14 mG/24Hr(s) Patch 1 Patch Transdermal daily  pregabalin 50 milliGRAM(s) Oral three times a day  QUEtiapine 100 milliGRAM(s) Oral daily  QUEtiapine 300 milliGRAM(s) Oral at bedtime  trimethoprim  160 mG/sulfamethoxazole 800 mG 1 Tablet(s) Oral <User Schedule>    MEDICATIONS  (PRN):  acetaminophen     Tablet .. 650 milliGRAM(s) Oral every 6 hours PRN Temp greater or equal to 38C (100.4F), Mild Pain (1 - 3)  baclofen 5 milliGRAM(s) Oral every 8 hours PRN Muscle Spasm or pain  melatonin 3 milliGRAM(s) Oral at bedtime PRN Insomnia      I&O's Summary      PHYSICAL EXAM:  Vital Signs Last 24 Hrs  T(C): 36.4 (11 Dec 2022 05:29), Max: 36.5 (10 Dec 2022 18:00)  T(F): 97.6 (11 Dec 2022 05:29), Max: 97.7 (10 Dec 2022 18:00)  HR: 60 (11 Dec 2022 05:29) (60 - 72)  BP: 124/62 (11 Dec 2022 05:29) (98/55 - 131/68)  BP(mean): --  RR: 18 (11 Dec 2022 05:29) (17 - 19)  SpO2: 99% (11 Dec 2022 05:29) (99% - 100%)    Parameters below as of 11 Dec 2022 05:29  Patient On (Oxygen Delivery Method): room air      CONSTITUTIONAL: NAD, well-developed, well-groomed  RESPIRATORY: Normal respiratory effort; lungs are clear to auscultation bilaterally  CARDIOVASCULAR: Regular rate and rhythm, normal S1 and S2, no murmur/rub/gallop; No lower extremity edema; Peripheral pulses are 2+ bilaterally  ABDOMEN: Nontender to palpation, normoactive bowel sounds, no rebound/guarding; No hepatosplenomegaly  MUSCULOSKELETAL:  Normal gait; no clubbing or cyanosis of digits; no joint swelling or tenderness to palpation  PSYCH: A+O to person, place, and time; affect appropriate  NEUROLOGY: CN 2-12 are intact and symmetric; no gross sensory deficits   SKIN: No rashes; no palpable lesions    LABS:                        12.1   3.22  )-----------( 196      ( 11 Dec 2022 06:35 )             38.4     12-11    137  |  103  |  14  ----------------------------<  94  3.9   |  22  |  0.85    Ca    9.1      11 Dec 2022 06:35  Phos  3.5     12-11  Mg     1.30     12-11                SARS-CoV-2: NotDetec (07 Dec 2022 08:35)  SARS-CoV-2: NotDetec (10 Nov 2022 09:30)  COVID-19 PCR: NotDetec (18 Sep 2022 22:39)  COVID-19 PCR: NotDetec (16 Sep 2022 06:48)  SARS-CoV-2: NotDetec (11 Sep 2022 23:31)  SARS-CoV-2: NotDetec (31 Aug 2022 05:46)  COVID-19 PCR: NotDetec (15 Aug 2022 09:10)  COVID-19 PCR: NotDetec (12 Aug 2022 18:30)  SARS-CoV-2: NotDetec (09 Aug 2022 04:13)      RADIOLOGY & ADDITIONAL TESTS:  New Results Reviewed Today:   New Imaging Personally Reviewed Today:  New Electrocardiogram Personally Reviewed Today:  Prior or Outpatient Records Reviewed Today:    COMMUNICATION:  Care Discussed with Consultants/Other Providers and Details of Discussion:  Discussions with Patient/Family:  PCP Communication:

## 2022-12-11 NOTE — PROGRESS NOTE ADULT - PROBLEM SELECTOR PLAN 3
DVT ppx: Lovenox   Diet: Regular Diet   Dispo: patient received from shelter, previously offered SNF but declined; at this time is amenable to placement in SNF as he is unable to take care of himself; Pending [facility]

## 2022-12-11 NOTE — PROGRESS NOTE ADULT - PROBLEM SELECTOR PLAN 1
- B/l LE weakness, hyperreflexia, spasticity, positive Babinski concerning for myelopathy possibly from HIV; able to move all extremities, strength 3/5 strength in LE bilaterally    - Neurology called; Given multiple prior MRIs of the spine and progression of prior symptoms over a span of 4 years, can defer obtaining MRI for now  - PT/OT/SW eval for dispo  - Continue Home medications for pain  - Vit B12 nl, folate nl.   - B12 nl, folate nl , CK nl , ESR nl, CRP nl,   - Outpatient follow up with Dr Raman Tapia or Dr Des Garcia or if insurance is an issue may go to Neuro clinic  at [facility] 4th floor  - Vitamin b12 1000mg qD

## 2022-12-12 LAB
ANION GAP SERPL CALC-SCNC: 11 MMOL/L — SIGNIFICANT CHANGE UP (ref 7–14)
BASOPHILS # BLD AUTO: 0.01 K/UL — SIGNIFICANT CHANGE UP (ref 0–0.2)
BASOPHILS NFR BLD AUTO: 0.2 % — SIGNIFICANT CHANGE UP (ref 0–2)
BUN SERPL-MCNC: 16 MG/DL — SIGNIFICANT CHANGE UP (ref 7–23)
CALCIUM SERPL-MCNC: 9.5 MG/DL — SIGNIFICANT CHANGE UP (ref 8.4–10.5)
CHLORIDE SERPL-SCNC: 102 MMOL/L — SIGNIFICANT CHANGE UP (ref 98–107)
CO2 SERPL-SCNC: 25 MMOL/L — SIGNIFICANT CHANGE UP (ref 22–31)
CREAT SERPL-MCNC: 0.88 MG/DL — SIGNIFICANT CHANGE UP (ref 0.5–1.3)
CULTURE RESULTS: SIGNIFICANT CHANGE UP
CULTURE RESULTS: SIGNIFICANT CHANGE UP
EGFR: 116 ML/MIN/1.73M2 — SIGNIFICANT CHANGE UP
EOSINOPHIL # BLD AUTO: 0.07 K/UL — SIGNIFICANT CHANGE UP (ref 0–0.5)
EOSINOPHIL NFR BLD AUTO: 1.5 % — SIGNIFICANT CHANGE UP (ref 0–6)
GLUCOSE SERPL-MCNC: 113 MG/DL — HIGH (ref 70–99)
HCT VFR BLD CALC: 37 % — LOW (ref 39–50)
HGB BLD-MCNC: 11.8 G/DL — LOW (ref 13–17)
IANC: 0.74 K/UL — LOW (ref 1.8–7.4)
IMM GRANULOCYTES NFR BLD AUTO: 0.2 % — SIGNIFICANT CHANGE UP (ref 0–0.9)
LYMPHOCYTES # BLD AUTO: 3.35 K/UL — HIGH (ref 1–3.3)
LYMPHOCYTES # BLD AUTO: 73 % — HIGH (ref 13–44)
MAGNESIUM SERPL-MCNC: 1.3 MG/DL — LOW (ref 1.6–2.6)
MCHC RBC-ENTMCNC: 28.8 PG — SIGNIFICANT CHANGE UP (ref 27–34)
MCHC RBC-ENTMCNC: 31.9 GM/DL — LOW (ref 32–36)
MCV RBC AUTO: 90.2 FL — SIGNIFICANT CHANGE UP (ref 80–100)
MONOCYTES # BLD AUTO: 0.41 K/UL — SIGNIFICANT CHANGE UP (ref 0–0.9)
MONOCYTES NFR BLD AUTO: 8.9 % — SIGNIFICANT CHANGE UP (ref 2–14)
NEUTROPHILS # BLD AUTO: 0.74 K/UL — LOW (ref 1.8–7.4)
NEUTROPHILS NFR BLD AUTO: 16.2 % — LOW (ref 43–77)
NRBC # BLD: 0 /100 WBCS — SIGNIFICANT CHANGE UP (ref 0–0)
NRBC # FLD: 0 K/UL — SIGNIFICANT CHANGE UP (ref 0–0)
PHOSPHATE SERPL-MCNC: 4.4 MG/DL — SIGNIFICANT CHANGE UP (ref 2.5–4.5)
PLATELET # BLD AUTO: 187 K/UL — SIGNIFICANT CHANGE UP (ref 150–400)
POTASSIUM SERPL-MCNC: 3.5 MMOL/L — SIGNIFICANT CHANGE UP (ref 3.5–5.3)
POTASSIUM SERPL-SCNC: 3.5 MMOL/L — SIGNIFICANT CHANGE UP (ref 3.5–5.3)
RBC # BLD: 4.1 M/UL — LOW (ref 4.2–5.8)
RBC # FLD: 13.3 % — SIGNIFICANT CHANGE UP (ref 10.3–14.5)
SARS-COV-2 RNA SPEC QL NAA+PROBE: SIGNIFICANT CHANGE UP
SODIUM SERPL-SCNC: 138 MMOL/L — SIGNIFICANT CHANGE UP (ref 135–145)
SPECIMEN SOURCE: SIGNIFICANT CHANGE UP
SPECIMEN SOURCE: SIGNIFICANT CHANGE UP
WBC # BLD: 4.59 K/UL — SIGNIFICANT CHANGE UP (ref 3.8–10.5)
WBC # FLD AUTO: 4.59 K/UL — SIGNIFICANT CHANGE UP (ref 3.8–10.5)

## 2022-12-12 PROCEDURE — 99232 SBSQ HOSP IP/OBS MODERATE 35: CPT

## 2022-12-12 RX ORDER — MAGNESIUM OXIDE 400 MG ORAL TABLET 241.3 MG
400 TABLET ORAL ONCE
Refills: 0 | Status: COMPLETED | OUTPATIENT
Start: 2022-12-12 | End: 2022-12-12

## 2022-12-12 RX ORDER — MAGNESIUM OXIDE 400 MG ORAL TABLET 241.3 MG
400 TABLET ORAL
Refills: 0 | Status: COMPLETED | OUTPATIENT
Start: 2022-12-12 | End: 2022-12-15

## 2022-12-12 RX ORDER — MAGNESIUM SULFATE 500 MG/ML
2 VIAL (ML) INJECTION ONCE
Refills: 0 | Status: DISCONTINUED | OUTPATIENT
Start: 2022-12-12 | End: 2022-12-12

## 2022-12-12 RX ADMIN — PREGABALIN 1000 MICROGRAM(S): 225 CAPSULE ORAL at 11:16

## 2022-12-12 RX ADMIN — Medication 50 MILLIGRAM(S): at 05:34

## 2022-12-12 RX ADMIN — Medication 5 MILLIGRAM(S): at 11:17

## 2022-12-12 RX ADMIN — QUETIAPINE FUMARATE 300 MILLIGRAM(S): 200 TABLET, FILM COATED ORAL at 22:19

## 2022-12-12 RX ADMIN — QUETIAPINE FUMARATE 100 MILLIGRAM(S): 200 TABLET, FILM COATED ORAL at 11:15

## 2022-12-12 RX ADMIN — BICTEGRAVIR SODIUM, EMTRICITABINE, AND TENOFOVIR ALAFENAMIDE FUMARATE 1 TABLET(S): 30; 120; 15 TABLET ORAL at 11:15

## 2022-12-12 RX ADMIN — Medication 50 MILLIGRAM(S): at 21:55

## 2022-12-12 RX ADMIN — MAGNESIUM OXIDE 400 MG ORAL TABLET 400 MILLIGRAM(S): 241.3 TABLET ORAL at 17:49

## 2022-12-12 RX ADMIN — Medication 1 TABLET(S): at 11:16

## 2022-12-12 RX ADMIN — Medication 50 MILLIGRAM(S): at 13:00

## 2022-12-12 RX ADMIN — MAGNESIUM OXIDE 400 MG ORAL TABLET 400 MILLIGRAM(S): 241.3 TABLET ORAL at 11:15

## 2022-12-12 NOTE — PROGRESS NOTE ADULT - PROBLEM SELECTOR PLAN 1
- B/l LE weakness, hyperreflexia, spasticity, positive Babinski concerning for myelopathy possibly from HIV; able to move all extremities, strength 3/5 strength in LE bilaterally    - Neurology called; Given multiple prior MRIs of the spine and progression of prior symptoms over a span of 4 years, can defer obtaining MRI for now  - PT/OT/SW eval for dispo  - Continue Home medications for pain  - Vit B12 nl, folate nl.   - B12 nl, folate nl , CK nl , ESR nl, CRP nl,   - Outpatient follow up with Dr Raman Tapia or Dr Des Garcia or if insurance is an issue may go to Neuro clinic  at [facility] 4th floor  - Vitamin b12 1000mg qD

## 2022-12-12 NOTE — PROGRESS NOTE ADULT - ASSESSMENT
32 year old living with HIV, presents with lower extremity weakness.     1) HIV  Ptt states he wants to follow up with Dr. Hunter but has been unable  [facility]    He is on Biktarvy with a high viral load.  I am concerned about adherence.  Initially he stated he only missed taking his Biktarvy since wednesday, then he states he has missed a few doses but not a lot.    genosure sent in 8/2022 - wound not predict resistance to Biktarvy    CD4 less than 200 and viral load at 391 K    I would continue biktarvy at this time.  Repeat a viral load around 12/22    If VL is not better at that time, would repeat genosure    Bactrim DS 3x/ week on Mon/ Wed/ Friday    2) COVID  notes mild uri symptoms    3) Lower extremity weakness  Pt has multiple prior admissions with LE weakness  There has been concern for HIV myelopathy in the past.   During prior admissions, his LE strength has improved over a few days without explanation  ? component of behavioral health  Monitor closely for change in clinical status    See ama note from 9/15/2022    4) Denies substance use  tox screen positive for cocaine    Follow up with ID as an outpt [phone]  Call ID service with questions

## 2022-12-12 NOTE — PROGRESS NOTE ADULT - SUBJECTIVE AND OBJECTIVE BOX
Patient is a 32y old  Male who presents with a chief complaint of Monitor for cauda equina syndrome (11 Dec 2022 08:36)    SUBJECTIVE / OVERNIGHT EVENTS: No acute events. Tolerated breakfast without issue. No nausea, vomiting, chest pain, fever, chills, abdominal pain or new weakness or pain.    MEDICATIONS  (STANDING):  bictegravir 50 mG/emtricitabine 200 mG/tenofovir alafenamide 25 mG (BIKTARVY) 1 Tablet(s) Oral daily  cyanocobalamin 1000 MICROGram(s) Oral daily  enoxaparin Injectable 40 milliGRAM(s) SubCutaneous every 24 hours  fluticasone propionate 50 MICROgram(s)/spray Nasal Spray 1 Spray(s) Both Nostrils two times a day  influenza   Vaccine 0.5 milliLiter(s) IntraMuscular once  nicotine -  14 mG/24Hr(s) Patch 1 Patch Transdermal daily  pregabalin 50 milliGRAM(s) Oral three times a day  QUEtiapine 100 milliGRAM(s) Oral daily  QUEtiapine 300 milliGRAM(s) Oral at bedtime  trimethoprim  160 mG/sulfamethoxazole 800 mG 1 Tablet(s) Oral <User Schedule>    MEDICATIONS  (PRN):  acetaminophen     Tablet .. 650 milliGRAM(s) Oral every 6 hours PRN Temp greater or equal to 38C (100.4F), Mild Pain (1 - 3)  baclofen 5 milliGRAM(s) Oral every 8 hours PRN Muscle Spasm or pain  melatonin 3 milliGRAM(s) Oral at bedtime PRN Insomnia    CAPILLARY BLOOD GLUCOSE    I&O's Summary    11 Dec 2022 07:01  -  12 Dec 2022 07:00  --------------------------------------------------------  IN: 500 mL / OUT: 700 mL / NET: -200 mL    PHYSICAL EXAM:  Vital Signs Last 24 Hrs  T(C): 36.6 (12 Dec 2022 11:00), Max: 36.6 (11 Dec 2022 18:00)  T(F): 97.8 (12 Dec 2022 11:00), Max: 97.8 (11 Dec 2022 18:00)  HR: 76 (12 Dec 2022 11:00) (64 - 76)  BP: 122/73 (12 Dec 2022 11:00) (105/50 - 129/65)  BP(mean): --  RR: 17 (12 Dec 2022 11:00) (17 - 18)  SpO2: 100% (12 Dec 2022 11:00) (100% - 100%)    Parameters below as of 12 Dec 2022 11:00  Patient On (Oxygen Delivery Method): room air    CONSTITUTIONAL: NAD, well-developed, sitting up in bed  EYES: conjunctiva and sclera clear  ENMT: Moist oral mucosa  RESPIRATORY: Normal respiratory effort; lungs are clear to auscultation bilaterally  CARDIOVASCULAR: Regular rate and rhythm, normal S1 and S2, No lower extremity edema; Peripheral pulses are 2+ bilaterally  ABDOMEN: Nontender to palpation, normoactive bowel sounds, no rebound/guarding  PSYCH: calm, affect appropriate  SKIN: No rashes; no palpable lesions    LABS:                        11.8   4.59  )-----------( 187      ( 12 Dec 2022 06:00 )             37.0     12-12    138  |  102  |  16  ----------------------------<  113<H>  3.5   |  25  |  0.88    Ca    9.5      12 Dec 2022 06:00  Phos  4.4     12-12  Mg     1.30     12-12    RADIOLOGY & ADDITIONAL TESTS: Reviewed    COORDINATION OF CARE:  Care Discussed with Consultants/Other Providers [[facility]- Medicine ACP]

## 2022-12-12 NOTE — PROGRESS NOTE ADULT - SUBJECTIVE AND OBJECTIVE BOX
Follow Up:      Inverval History/ROS:Patient is a 32y old  Male who presents with a chief complaint of Monitor for cauda equina syndrome (12 Dec 2022 13:52)    He is walking without difficulty  no fever      Allergies    Ceclor (Unknown)  Toradol (Anaphylaxis; Swelling)  Toradol (Swelling)    Intolerances        ANTIMICROBIALS:  bictegravir 50 mG/emtricitabine 200 mG/tenofovir alafenamide 25 mG (BIKTARVY) 1 daily  trimethoprim  160 mG/sulfamethoxazole 800 mG 1 <User Schedule>      OTHER MEDS:  acetaminophen     Tablet .. 650 milliGRAM(s) Oral every 6 hours PRN  baclofen 5 milliGRAM(s) Oral every 8 hours PRN  cyanocobalamin 1000 MICROGram(s) Oral daily  enoxaparin Injectable 40 milliGRAM(s) SubCutaneous every 24 hours  fluticasone propionate 50 MICROgram(s)/spray Nasal Spray 1 Spray(s) Both Nostrils two times a day  influenza   Vaccine 0.5 milliLiter(s) IntraMuscular once  melatonin 3 milliGRAM(s) Oral at bedtime PRN  nicotine -  14 mG/24Hr(s) Patch 1 Patch Transdermal daily  pregabalin 50 milliGRAM(s) Oral three times a day  QUEtiapine 100 milliGRAM(s) Oral daily  QUEtiapine 300 milliGRAM(s) Oral at bedtime      Vital Signs Last 24 Hrs  T(C): 36.6 (12 Dec 2022 11:00), Max: 36.6 (11 Dec 2022 18:00)  T(F): 97.8 (12 Dec 2022 11:00), Max: 97.8 (11 Dec 2022 18:00)  HR: 76 (12 Dec 2022 11:00) (64 - 76)  BP: 122/73 (12 Dec 2022 11:00) (105/50 - 129/65)  BP(mean): --  RR: 17 (12 Dec 2022 11:00) (17 - 18)  SpO2: 100% (12 Dec 2022 11:00) (100% - 100%)    Parameters below as of 12 Dec 2022 11:00  Patient On (Oxygen Delivery Method): room air        PHYSICAL EXAM:  General: [x ] non-toxic  HEAD/EYES: [ ] PERRL [ x] white sclera [ ] icterus  ENT:  [ ] normal [ x] supple [ ] thrush [ ] pharyngeal exudate  Cardiovascular:   [ ] murmur x[ ] normal [ ] PPM/AICD  Respiratory:  [ x] clear to ausculation bilaterally  GI:  [x ] soft, non-tender, normal bowel sounds  :  [ ] gonzales [x ] no CVA tenderness   Musculoskeletal:  [ ] no synovitis  Neurologic:  [ ] non-focal exam   Skin:  [x ] no rash  Lymph: [ ] no lymphadenopathy  Psychiatric:  [ ] appropriate affect [ ] alert & oriented  Lines:  [x ] no phlebitis [ ] central line                                11.8   4.59  )-----------( 187      ( 12 Dec 2022 06:00 )             37.0       12-12    138  |  102  |  16  ----------------------------<  113<H>  3.5   |  25  |  0.88    Ca    9.5      12 Dec 2022 06:00  Phos  4.4     12-12  Mg     1.30     12-12            MICROBIOLOGY:Culture Results:   <10,000 CFU/mL Normal Urogenital Jeimy (12-07-22 @ 18:53)  Culture Results:   No Growth Final (12-07-22 @ 09:50)  Culture Results:   No Growth Final (12-07-22 @ 09:35)      RADIOLOGY:

## 2022-12-12 NOTE — PROGRESS NOTE ADULT - PROBLEM SELECTOR PLAN 2
- Viral load now elevated at 391,[phone] with CD4 count 145   - concern for noncompliance with medications  - Appreciate ID input, patient well known to service and patient is non-compliant with medications and does not follow up with ID; states that patient will need repeat VL in 2 weeks; if VL still remains elevated will obtain genosure at that time  - continue with home HARRT medications; Biktarvy  - given CD4 count will start on Bactrim for PCP ppx    - Compliance will continued to be reinforced with patient.    #Recent COVID Infection - per infection control guidelines should maintain isolation for ~21 days following infection (initial positive on 11/22) - Currently testing negative

## 2022-12-12 NOTE — PROGRESS NOTE ADULT - PROBLEM SELECTOR PLAN 3
Patient education provided.    DVT ppx: Lovenox   Diet: Regular Diet   Dispo: patient received from shelter, previously offered SNF but declined; at this time is amenable to placement in SNF as he is unable to take care of himself; Pending [facility]

## 2022-12-13 ENCOUNTER — TRANSCRIPTION ENCOUNTER (OUTPATIENT)
Age: 32
End: 2022-12-13

## 2022-12-13 DIAGNOSIS — R29.898 OTHER SYMPTOMS AND SIGNS INVOLVING THE MUSCULOSKELETAL SYSTEM: ICD-10-CM

## 2022-12-13 LAB
ANION GAP SERPL CALC-SCNC: 11 MMOL/L — SIGNIFICANT CHANGE UP (ref 7–14)
BUN SERPL-MCNC: 10 MG/DL — SIGNIFICANT CHANGE UP (ref 7–23)
CALCIUM SERPL-MCNC: 9.2 MG/DL — SIGNIFICANT CHANGE UP (ref 8.4–10.5)
CHLORIDE SERPL-SCNC: 105 MMOL/L — SIGNIFICANT CHANGE UP (ref 98–107)
CO2 SERPL-SCNC: 23 MMOL/L — SIGNIFICANT CHANGE UP (ref 22–31)
CREAT SERPL-MCNC: 0.86 MG/DL — SIGNIFICANT CHANGE UP (ref 0.5–1.3)
EGFR: 117 ML/MIN/1.73M2 — SIGNIFICANT CHANGE UP
GLUCOSE SERPL-MCNC: 100 MG/DL — HIGH (ref 70–99)
HCT VFR BLD CALC: 37.8 % — LOW (ref 39–50)
HGB BLD-MCNC: 12 G/DL — LOW (ref 13–17)
HOMOCYSTEINE LEVEL: 7.9 UMOL/L — SIGNIFICANT CHANGE UP
MAGNESIUM SERPL-MCNC: 1.3 MG/DL — LOW (ref 1.6–2.6)
MCHC RBC-ENTMCNC: 28.8 PG — SIGNIFICANT CHANGE UP (ref 27–34)
MCHC RBC-ENTMCNC: 31.7 GM/DL — LOW (ref 32–36)
MCV RBC AUTO: 90.6 FL — SIGNIFICANT CHANGE UP (ref 80–100)
METHYLMALONIC ACID LEVEL: 106 NMOL/L — SIGNIFICANT CHANGE UP (ref 87–318)
NRBC # BLD: 0 /100 WBCS — SIGNIFICANT CHANGE UP (ref 0–0)
NRBC # FLD: 0 K/UL — SIGNIFICANT CHANGE UP (ref 0–0)
PHOSPHATE SERPL-MCNC: 4.3 MG/DL — SIGNIFICANT CHANGE UP (ref 2.5–4.5)
PLATELET # BLD AUTO: 205 K/UL — SIGNIFICANT CHANGE UP (ref 150–400)
POTASSIUM SERPL-MCNC: 3.9 MMOL/L — SIGNIFICANT CHANGE UP (ref 3.5–5.3)
POTASSIUM SERPL-SCNC: 3.9 MMOL/L — SIGNIFICANT CHANGE UP (ref 3.5–5.3)
RBC # BLD: 4.17 M/UL — LOW (ref 4.2–5.8)
RBC # FLD: 13.2 % — SIGNIFICANT CHANGE UP (ref 10.3–14.5)
SODIUM SERPL-SCNC: 139 MMOL/L — SIGNIFICANT CHANGE UP (ref 135–145)
WBC # BLD: 3.96 K/UL — SIGNIFICANT CHANGE UP (ref 3.8–10.5)
WBC # FLD AUTO: 3.96 K/UL — SIGNIFICANT CHANGE UP (ref 3.8–10.5)

## 2022-12-13 PROCEDURE — 99232 SBSQ HOSP IP/OBS MODERATE 35: CPT

## 2022-12-13 RX ORDER — BICTEGRAVIR SODIUM, EMTRICITABINE, AND TENOFOVIR ALAFENAMIDE FUMARATE 30; 120; 15 MG/1; MG/1; MG/1
1 TABLET ORAL
Qty: 0 | Refills: 0 | DISCHARGE
Start: 2022-12-13

## 2022-12-13 RX ORDER — FLUTICASONE PROPIONATE 50 MCG
1 SPRAY, SUSPENSION NASAL
Qty: 0 | Refills: 0 | DISCHARGE
Start: 2022-12-13

## 2022-12-13 RX ORDER — BACLOFEN 100 %
1 POWDER (GRAM) MISCELLANEOUS
Qty: 0 | Refills: 0 | DISCHARGE
Start: 2022-12-13

## 2022-12-13 RX ORDER — LANOLIN ALCOHOL/MO/W.PET/CERES
1 CREAM (GRAM) TOPICAL
Qty: 0 | Refills: 0 | DISCHARGE
Start: 2022-12-13

## 2022-12-13 RX ORDER — QUETIAPINE FUMARATE 200 MG/1
1 TABLET, FILM COATED ORAL
Qty: 0 | Refills: 0 | DISCHARGE
Start: 2022-12-13

## 2022-12-13 RX ORDER — PREGABALIN 225 MG/1
1 CAPSULE ORAL
Qty: 0 | Refills: 0 | DISCHARGE
Start: 2022-12-13

## 2022-12-13 RX ADMIN — QUETIAPINE FUMARATE 300 MILLIGRAM(S): 200 TABLET, FILM COATED ORAL at 22:40

## 2022-12-13 RX ADMIN — Medication 50 MILLIGRAM(S): at 22:04

## 2022-12-13 RX ADMIN — PREGABALIN 1000 MICROGRAM(S): 225 CAPSULE ORAL at 13:10

## 2022-12-13 RX ADMIN — Medication 50 MILLIGRAM(S): at 13:10

## 2022-12-13 RX ADMIN — QUETIAPINE FUMARATE 100 MILLIGRAM(S): 200 TABLET, FILM COATED ORAL at 13:10

## 2022-12-13 RX ADMIN — Medication 50 MILLIGRAM(S): at 05:53

## 2022-12-13 RX ADMIN — MAGNESIUM OXIDE 400 MG ORAL TABLET 400 MILLIGRAM(S): 241.3 TABLET ORAL at 13:10

## 2022-12-13 RX ADMIN — MAGNESIUM OXIDE 400 MG ORAL TABLET 400 MILLIGRAM(S): 241.3 TABLET ORAL at 17:41

## 2022-12-13 RX ADMIN — BICTEGRAVIR SODIUM, EMTRICITABINE, AND TENOFOVIR ALAFENAMIDE FUMARATE 1 TABLET(S): 30; 120; 15 TABLET ORAL at 13:10

## 2022-12-13 RX ADMIN — MAGNESIUM OXIDE 400 MG ORAL TABLET 400 MILLIGRAM(S): 241.3 TABLET ORAL at 09:13

## 2022-12-13 NOTE — DISCHARGE NOTE PROVIDER - HOSPITAL COURSE
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 No plans for MRI per Neuro as patient with multiple studies in the past.  Now with elevated VL and low CD4 count.     Lower extremity weakness.    - B/l LE weakness, hyperreflexia, spasticity, positive Babinski concerning for myelopathy possibly from HIV; able to move all extremities, strength 3/5 strength in LE bilaterally    - Neurology called; Given multiple prior MRIs of the spine and progression of prior symptoms over a span of 4 years, can defer obtaining MRI for now  - PT/OT/SW eval, recommending Rehab  - Continue Home medications for pain  - Vit B12 nl, folate nl.   - B12 nl, folate nl , CK nl , ESR nl, CRP nl,   - Outpatient follow up with Dr Raman Tapia or Dr Des Garcia or if insurance is an issue may go to Neuro clinic  at [facility] 4th floor  - Vitamin b12 1000mg qD.     HIV disease.   - Viral load now elevated at 391,755 with CD4 count 145   - concern for noncompliance with medications  - Appreciate ID input, patient well known to service and patient is non-compliant with medications and does not follow up with ID; states that patient will need repeat VL in 2 weeks; if VL still remains elevated will obtain genosure at that time  - continue with home HARRT medications; Biktarvy  - given CD4 count will start on Bactrim for PCP ppx    - Compliance will continued to be reinforced with patient.    #Recent COVID Infection - per infection control guidelines should maintain isolation for ~21 days following infection (initial positive on 11/22) - Currently testing negative.    Prophylactic measure.   - DVT ppx: Lovenox   - Diet: Regular Diet   - DC: Rehab    On_________, discussed with __________, patient is medically cleared and optimized for discharge today. All medications were reviewed with attending, and sent to mutually agreed upon pharmacy.   33 yo male PMHx of congenital HIV c/b myelopathy, GBS, and recently resolved COVID infection presenting with leg weakness bilaterally, evaluated by Neurology and ID. Now medically optimized, awaiting placement.     Lower extremity weakness  - B/l LE weakness, hyperreflexia, spasticity, positive Babinski concerning for myelopathy possibly from HIV; able to move all extremities, strength 3/5 strength in LE bilaterally    - Neurology consult appreciated. Given multiple prior MRIs of the spine and progression of prior symptoms over a span of 4 years, no further inpatient imaging recommended  - PT/OT/SW appreciated. Pending BRAYDEN  - Continue Home medications for pain  - Vit B12 nl, folate nl.   - B12 nl, folate nl , CK nl , ESR nl, CRP nl,   - Outpatient follow up with Dr Raman Tapia or Dr Des Garcia or if insurance is an issue may go to Neuro clinic  at [facility] 4th floor  - Vitamin b12 1000mg qD.  - Pt successfully walked out of hospital A.M.A. 12/17/22 AM.    HIV disease  - Viral load now elevated at 391,755 with CD4 count 145   - concern for noncompliance with medications  - Appreciate ID input, patient well known to service and patient is non-compliant with medications and does not follow up with ID; states that patient will need repeat VL in 2 weeks; if VL still remains elevated will obtain genosure at that time  - continue with home HARRT medications; Biktarvy  - given CD4 count will start on Bactrim for PCP ppx    - Compliance will continued to be reinforced with patient.    Recent COVID Infection - per infection control guidelines should maintain isolation for ~21 days following infection (initial positive on 11/22) - Currently testing negative, asymptomatic, isolation precautions removed.    Prophylactic measure    DVT ppx: Lovenox   Diet: Regular Diet   Dispo: PT recommending BRAYDEN. Appreciate CM/SW input.      On 12/17/22,  discussed with Dr. Bismark Polanco, patient is medically cleared but pending SW placement for living.  All medications were reviewed with attending, and sent to pt's preferred pharmacy for 14 days per attending.  Pt left the hospital against medical advice. Patient is A&O x3 and has full capacity to make his own medical decisions. Pt was informed of their medical conditions, benefits, and alternatives to leaving against medical advice by the nurse, however left the premises before provider could arrive to bedside.  Attending physician aware.    33 yo male PMHx of congenital HIV c/b myelopathy, GBS, and recently resolved COVID infection presenting with leg weakness bilaterally, evaluated by Neurology and ID. Now medically optimized, awaiting placement.     Lower extremity weakness  - B/l LE weakness, hyperreflexia, spasticity, positive Babinski concerning for myelopathy possibly from HIV; able to move all extremities, strength 3/5 strength in LE bilaterally    - Neurology consult appreciated. Given multiple prior MRIs of the spine and progression of prior symptoms over a span of 4 years, no further inpatient imaging recommended  - PT/OT/SW appreciated. Pending BRAYDEN  - Continue Home medications for pain  - Vit B12 nl, folate nl.   - B12 nl, folate nl , CK nl , ESR nl, CRP nl,   - Outpatient follow up with Dr Raman Tapia or Dr Des Garcia or if insurance is an issue may go to Neuro clinic  at [facility] 4th floor  - Vitamin b12 1000mg qD.  - Pt successfully walked out of hospital A.M.A. 12/17/22 AM.    HIV disease  - Viral load now elevated at 391,755 with CD4 count 145   - concern for noncompliance with medications  - Appreciate ID input, patient well known to service and patient is non-compliant with medications and does not follow up with ID; states that patient will need repeat VL in 2 weeks; if VL still remains elevated will obtain genosure at that time  - continue with home HARRT medications; Biktarvy  - given CD4 count will start on Bactrim for PCP ppx    - Compliance will continued to be reinforced with patient.    Recent COVID Infection - per infection control guidelines should maintain isolation for ~21 days following infection (initial positive on 11/22) - Currently testing negative, asymptomatic, isolation precautions removed.    Prophylactic measure    DVT ppx: Lovenox   Diet: Regular Diet   Dispo: PT recommending BRAYDEN. Appreciate CM/SW input.      On 12/17/22,  discussed with Dr. Bismark Polanco, patient is medically cleared but pending SW placement for living.  All medications were reviewed with attending, and sent to pt's preferred pharmacy for 14 days per attending.  Pt left the hospital against medical advice. Patient is A&O x3 and has full capacity to make his own medical decisions. Pt was informed of their medical conditions, benefits, and alternatives to leaving against medical advice by the nurse, however left the premises before provider could arrive to bedside.  Attending physician aware.     Attending Addendum: Unable to discuss the AMA process, assess patient's full understanding, or examine patient as he eloped prior to my arrival at his bedside.

## 2022-12-13 NOTE — DISCHARGE NOTE PROVIDER - NSDCFUADDAPPT_GEN_ALL_CORE_FT
Follow up with your primary care physician for further monitoring in 1-2 weeks. Please call to arrange appointment.  Follow up with neurology, Dr Raman Tapia or Dr Des Garcia or if insurance is an issue may go to Neuro clinic  at [facility] 4th floor

## 2022-12-13 NOTE — PROGRESS NOTE ADULT - ASSESSMENT
31 yo male PMHx of congenital HIV c/b myelopathy, GBS, and recently resolved COVID infection presenting with leg weakness bilaterally, evaluated by Neurology and ID. Now medically optimized, awaiting placement.

## 2022-12-13 NOTE — DISCHARGE NOTE PROVIDER - NSDCCPCAREPLAN_GEN_ALL_CORE_FT
PRINCIPAL DISCHARGE DIAGNOSIS  Diagnosis: Lower extremity weakness  Assessment and Plan of Treatment: You were admitted for weakness. You are to follow up with PCP and neurology as outpatient for further management.      SECONDARY DISCHARGE DIAGNOSES  Diagnosis: HIV disease  Assessment and Plan of Treatment: Sharathdesiree bañuelos    Diagnosis: Lower extremity weakness  Assessment and Plan of Treatment:      PRINCIPAL DISCHARGE DIAGNOSIS  Diagnosis: Lower extremity weakness  Assessment and Plan of Treatment: You were admitted for weakness. You are to follow up with Primary Care Provider and neurology as outpatient for further management.      SECONDARY DISCHARGE DIAGNOSES  Diagnosis: HIV disease  Assessment and Plan of Treatment: Please continue Biktarvy daily. Continue Bactrim (trimethoprim/sulfamethoxazole) for protection against opportunistic bacterial infections such as PCP Pneumonia every Monday, Wednesday, and Friday.  It is important to continue your medications as prescribed and follow up with Infectious Disease regularly as  without medication compliance you will enter the AIDS stage of HIV.

## 2022-12-13 NOTE — DISCHARGE NOTE PROVIDER - NSDCMRMEDTOKEN_GEN_ALL_CORE_FT
baclofen 5 mg oral tablet: 1 tab(s) orally every 8 hours, As needed, Muscle Spasm or pain  bictegravir/emtricitabine/tenofovir 50 mg-200 mg-25 mg oral tablet: 1 tab(s) orally once a day  cyanocobalamin 1000 mcg oral tablet: 1 tab(s) orally once a day  fluticasone 50 mcg/inh nasal spray: 1 spray(s) nasal 2 times a day  magnesium oxide 400 mg oral tablet: 1 tab(s) orally 2 times a day (with meals)  melatonin 3 mg oral tablet: 1 tab(s) orally once a day (at bedtime), As needed, Insomnia  pregabalin 50 mg oral capsule: 1 cap(s) orally 3 times a day  QUEtiapine 100 mg oral tablet: 1 tab(s) orally once a day  QUEtiapine 300 mg oral tablet: 1 tab(s) orally once a day (at bedtime)  sulfamethoxazole-trimethoprim 800 mg-160 mg oral tablet: 1 tab(s) orally    baclofen 5 mg oral tablet: 1 tab(s) orally every 8 hours, As needed, Muscle Spasm or pain  bictegravir/emtricitabine/tenofovir 50 mg-200 mg-25 mg oral tablet: 1 tab(s) orally once a day  cyanocobalamin 1000 mcg oral tablet: 1 tab(s) orally once a day  fluticasone 50 mcg/inh nasal spray: 1 spray(s) nasal 2 times a day  magnesium oxide 400 mg oral tablet: 1 tab(s) orally 2 times a day (with meals)  melatonin 3 mg oral tablet: 1 tab(s) orally once a day (at bedtime), As needed, Insomnia  nicotine 14 mg/24 hr transdermal film, extended release: 1 patch transdermal once a day  pregabalin 50 mg oral capsule: 1 cap(s) orally 3 times a day  QUEtiapine 100 mg oral tablet: 1 tab(s) orally once a day  QUEtiapine 300 mg oral tablet: 1 tab(s) orally once a day (at bedtime)  sulfamethoxazole-trimethoprim 800 mg-160 mg oral tablet: 1 tab(s) orally 3 times a week (Mondays, Wednesdays, Fridays) to protect against opportunistic infections (PCP Pneumonia)   acetaminophen 325 mg oral tablet: 2 tab(s) orally every 6 hours, As needed, Temp greater or equal to 38C (100.4F), Mild Pain (1 - 3)  amoxicillin-clavulanate 875 mg-125 mg oral tablet: 1 tab(s) orally 2 times a day  baclofen 5 mg oral tablet: 1 tab(s) orally every 8 hours, As needed, Muscle Spasm or pain  bictegravir/emtricitabine/tenofovir 50 mg-200 mg-25 mg oral tablet: 1 tab(s) orally once a day  melatonin 3 mg oral tablet: 1 tab(s) orally once a day (at bedtime), As needed, Insomnia  pregabalin 50 mg oral capsule: 1 cap(s) orally 3 times a day  QUEtiapine 100 mg oral tablet: 1 tab(s) orally once a day  QUEtiapine 300 mg oral tablet: 1 tab(s) orally once a day (at bedtime)  sulfamethoxazole-trimethoprim 800 mg-160 mg oral tablet: 1 tab(s) orally 3 times a week (Mondays, Wednesdays, Fridays) to protect against opportunistic infections (PCP Pneumonia)

## 2022-12-13 NOTE — DISCHARGE NOTE PROVIDER - CARE PROVIDER_API CALL
Raman Tapia)  Electrodiagnostic Medicine; Internal Medicine; Neurology  [address], [address]  [place], [place] [id number]  Phone: [phone]  Fax: [phone]  Follow Up Time:     Des Garcia)  Neurology; Neurophysiology  [address], [address]  [place] [address]  Phone: [phone]  Fax: [phone]  Follow Up Time:

## 2022-12-13 NOTE — PROGRESS NOTE ADULT - SUBJECTIVE AND OBJECTIVE BOX
Patient is a 32y old  Male who presents with a chief complaint of Monitor for cauda equina syndrome (13 Dec 2022 11:06)    SUBJECTIVE / OVERNIGHT EVENTS: No acute events. Feels well. Back pain reported on admission has improved. Still with lower extremity weakness but feels a little stronger than previously.     MEDICATIONS  (STANDING):  bictegravir 50 mG/emtricitabine 200 mG/tenofovir alafenamide 25 mG (BIKTARVY) 1 Tablet(s) Oral daily  cyanocobalamin 1000 MICROGram(s) Oral daily  enoxaparin Injectable 40 milliGRAM(s) SubCutaneous every 24 hours  fluticasone propionate 50 MICROgram(s)/spray Nasal Spray 1 Spray(s) Both Nostrils two times a day  influenza   Vaccine 0.5 milliLiter(s) IntraMuscular once  magnesium oxide 400 milliGRAM(s) Oral three times a day with meals  nicotine -  14 mG/24Hr(s) Patch 1 Patch Transdermal daily  pregabalin 50 milliGRAM(s) Oral three times a day  QUEtiapine 100 milliGRAM(s) Oral daily  QUEtiapine 300 milliGRAM(s) Oral at bedtime  trimethoprim  160 mG/sulfamethoxazole 800 mG 1 Tablet(s) Oral <User Schedule>    MEDICATIONS  (PRN):  acetaminophen     Tablet .. 650 milliGRAM(s) Oral every 6 hours PRN Temp greater or equal to 38C (100.4F), Mild Pain (1 - 3)  baclofen 5 milliGRAM(s) Oral every 8 hours PRN Muscle Spasm or pain  melatonin 3 milliGRAM(s) Oral at bedtime PRN Insomnia    CAPILLARY BLOOD GLUCOSE    I&O's Summary    PHYSICAL EXAM:  Vital Signs Last 24 Hrs  T(C): 36.9 (13 Dec 2022 05:50), Max: 37.2 (12 Dec 2022 15:20)  T(F): 98.4 (13 Dec 2022 05:50), Max: 98.9 (12 Dec 2022 15:20)  HR: 60 (13 Dec 2022 05:50) (60 - 71)  BP: 131/75 (13 Dec 2022 05:50) (112/64 - 131/75)  BP(mean): --  RR: 17 (13 Dec 2022 05:50) (17 - 18)  SpO2: 100% (13 Dec 2022 05:50) (99% - 100%)    Parameters below as of 13 Dec 2022 05:50  Patient On (Oxygen Delivery Method): room air    CONSTITUTIONAL: NAD, well-developed, laying in bed  EYES: conjunctiva and sclera clear  ENMT: Moist oral mucosa  RESPIRATORY: Normal respiratory effort; lungs are clear to auscultation bilaterally  CARDIOVASCULAR: Regular rate and rhythm, normal S1 and S2, No lower extremity edema; Peripheral pulses are 2+ bilaterally  ABDOMEN: Nontender to palpation, normoactive bowel sounds, no rebound/guarding  PSYCH: calm, affect appropriate  NEURO: 3/5 LLE strength, 4/5 RLE strength, intact sensation to light touch  SKIN: No rashes; no palpable lesions    LABS:                        12.0   3.96  )-----------( 205      ( 13 Dec 2022 06:00 )             37.8     12-13    139  |  105  |  10  ----------------------------<  100<H>  3.9   |  23  |  0.86    Ca    9.2      13 Dec 2022 06:00  Phos  4.3     12-13  Mg     1.30     12-13    RADIOLOGY & ADDITIONAL TESTS: Reviewed    COORDINATION OF CARE:  Care Discussed with Consultants/Other Providers [Y- Medicine ACP]

## 2022-12-13 NOTE — PROGRESS NOTE ADULT - PROBLEM SELECTOR PLAN 1
- B/l LE weakness, hyperreflexia, spasticity, positive Babinski concerning for myelopathy possibly from HIV; able to move all extremities, strength 3/5 strength in LE bilaterally    - Neurology consult appreciated. Given multiple prior MRIs of the spine and progression of prior symptoms over a span of 4 years, no further inpatient imaging recommended  - PT/OT/SW eval for dispo  - Continue Home medications for pain  - Vit B12 nl, folate nl.   - B12 nl, folate nl , CK nl , ESR nl, CRP nl,   - Outpatient follow up with Dr Raman Tapia or Dr Des Garcia or if insurance is an issue may go to Neuro clinic  at [facility] 4th floor  - Vitamin b12 1000mg qD

## 2022-12-14 LAB
ANION GAP SERPL CALC-SCNC: 12 MMOL/L — SIGNIFICANT CHANGE UP (ref 7–14)
BUN SERPL-MCNC: 10 MG/DL — SIGNIFICANT CHANGE UP (ref 7–23)
CALCIUM SERPL-MCNC: 9.4 MG/DL — SIGNIFICANT CHANGE UP (ref 8.4–10.5)
CHLORIDE SERPL-SCNC: 106 MMOL/L — SIGNIFICANT CHANGE UP (ref 98–107)
CO2 SERPL-SCNC: 22 MMOL/L — SIGNIFICANT CHANGE UP (ref 22–31)
CREAT SERPL-MCNC: 0.75 MG/DL — SIGNIFICANT CHANGE UP (ref 0.5–1.3)
EGFR: 122 ML/MIN/1.73M2 — SIGNIFICANT CHANGE UP
GLUCOSE SERPL-MCNC: 92 MG/DL — SIGNIFICANT CHANGE UP (ref 70–99)
HCT VFR BLD CALC: 37.6 % — LOW (ref 39–50)
HGB BLD-MCNC: 11.7 G/DL — LOW (ref 13–17)
MAGNESIUM SERPL-MCNC: 1.5 MG/DL — LOW (ref 1.6–2.6)
MCHC RBC-ENTMCNC: 28.2 PG — SIGNIFICANT CHANGE UP (ref 27–34)
MCHC RBC-ENTMCNC: 31.1 GM/DL — LOW (ref 32–36)
MCV RBC AUTO: 90.6 FL — SIGNIFICANT CHANGE UP (ref 80–100)
NRBC # BLD: 0 /100 WBCS — SIGNIFICANT CHANGE UP (ref 0–0)
NRBC # FLD: 0 K/UL — SIGNIFICANT CHANGE UP (ref 0–0)
PHOSPHATE SERPL-MCNC: 4.8 MG/DL — HIGH (ref 2.5–4.5)
PLATELET # BLD AUTO: 202 K/UL — SIGNIFICANT CHANGE UP (ref 150–400)
POTASSIUM SERPL-MCNC: 4.4 MMOL/L — SIGNIFICANT CHANGE UP (ref 3.5–5.3)
POTASSIUM SERPL-SCNC: 4.4 MMOL/L — SIGNIFICANT CHANGE UP (ref 3.5–5.3)
RBC # BLD: 4.15 M/UL — LOW (ref 4.2–5.8)
RBC # FLD: 13.2 % — SIGNIFICANT CHANGE UP (ref 10.3–14.5)
SODIUM SERPL-SCNC: 140 MMOL/L — SIGNIFICANT CHANGE UP (ref 135–145)
WBC # BLD: 4.26 K/UL — SIGNIFICANT CHANGE UP (ref 3.8–10.5)
WBC # FLD AUTO: 4.26 K/UL — SIGNIFICANT CHANGE UP (ref 3.8–10.5)

## 2022-12-14 PROCEDURE — 99232 SBSQ HOSP IP/OBS MODERATE 35: CPT

## 2022-12-14 RX ADMIN — Medication 1 TABLET(S): at 12:51

## 2022-12-14 RX ADMIN — PREGABALIN 1000 MICROGRAM(S): 225 CAPSULE ORAL at 12:51

## 2022-12-14 RX ADMIN — MAGNESIUM OXIDE 400 MG ORAL TABLET 400 MILLIGRAM(S): 241.3 TABLET ORAL at 09:29

## 2022-12-14 RX ADMIN — MAGNESIUM OXIDE 400 MG ORAL TABLET 400 MILLIGRAM(S): 241.3 TABLET ORAL at 12:51

## 2022-12-14 RX ADMIN — Medication 1 SPRAY(S): at 12:52

## 2022-12-14 RX ADMIN — BICTEGRAVIR SODIUM, EMTRICITABINE, AND TENOFOVIR ALAFENAMIDE FUMARATE 1 TABLET(S): 30; 120; 15 TABLET ORAL at 12:51

## 2022-12-14 RX ADMIN — Medication 50 MILLIGRAM(S): at 14:03

## 2022-12-14 RX ADMIN — QUETIAPINE FUMARATE 300 MILLIGRAM(S): 200 TABLET, FILM COATED ORAL at 21:44

## 2022-12-14 RX ADMIN — MAGNESIUM OXIDE 400 MG ORAL TABLET 400 MILLIGRAM(S): 241.3 TABLET ORAL at 17:48

## 2022-12-14 RX ADMIN — QUETIAPINE FUMARATE 100 MILLIGRAM(S): 200 TABLET, FILM COATED ORAL at 12:51

## 2022-12-14 RX ADMIN — Medication 50 MILLIGRAM(S): at 21:45

## 2022-12-14 RX ADMIN — Medication 50 MILLIGRAM(S): at 06:07

## 2022-12-14 NOTE — DIETITIAN INITIAL EVALUATION ADULT - PERTINENT MEDS FT
MEDICATIONS  (STANDING):  bictegravir 50 mG/emtricitabine 200 mG/tenofovir alafenamide 25 mG (BIKTARVY) 1 Tablet(s) Oral daily  cyanocobalamin 1000 MICROGram(s) Oral daily  enoxaparin Injectable 40 milliGRAM(s) SubCutaneous every 24 hours  fluticasone propionate 50 MICROgram(s)/spray Nasal Spray 1 Spray(s) Both Nostrils two times a day  influenza   Vaccine 0.5 milliLiter(s) IntraMuscular once  magnesium oxide 400 milliGRAM(s) Oral three times a day with meals  nicotine -  14 mG/24Hr(s) Patch 1 Patch Transdermal daily  pregabalin 50 milliGRAM(s) Oral three times a day  QUEtiapine 100 milliGRAM(s) Oral daily  QUEtiapine 300 milliGRAM(s) Oral at bedtime  trimethoprim  160 mG/sulfamethoxazole 800 mG 1 Tablet(s) Oral <User Schedule>    MEDICATIONS  (PRN):  acetaminophen     Tablet .. 650 milliGRAM(s) Oral every 6 hours PRN Temp greater or equal to 38C (100.4F), Mild Pain (1 - 3)  baclofen 5 milliGRAM(s) Oral every 8 hours PRN Muscle Spasm or pain  melatonin 3 milliGRAM(s) Oral at bedtime PRN Insomnia

## 2022-12-14 NOTE — DIETITIAN INITIAL EVALUATION ADULT - PERTINENT LABORATORY DATA
12-14    140  |  106  |  10  ----------------------------<  92  4.4   |  22  |  0.75    Ca    9.4      14 Dec 2022 06:20  Phos  4.8     12-14  Mg     1.50     12-14    A1C with Estimated Average Glucose Result: 4.3 % (12-08-22 @ 06:26)  A1C with Estimated Average Glucose Result: 4.6 % (08-09-22 @ 16:00)  A1C with Estimated Average Glucose Result: 4.6 % (03-01-22 @ 03:45)

## 2022-12-14 NOTE — DIETITIAN INITIAL EVALUATION ADULT - NSICDXPASTMEDICALHX_GEN_ALL_CORE_FT
PAST MEDICAL HISTORY:  Asthma     Chronic sinusitis     Closed fracture of multiple ribs of right side, initial encounter     Cocaine abuse     GBS (Guillain [place] syndrome)     HIV (human immunodeficiency virus infection) from birth    Homeless

## 2022-12-14 NOTE — PHARMACOTHERAPY INTERVENTION NOTE - COMMENTS
Discharge medications reviewed with the patient. Outpatient medication schedule was discussed in detail including: medication name, indication, dose, administration times, treatment duration, side effects, drug interactions, and special instructions. Reinforced adherence with Biktarvy and importance of bactrim while CD4 is low. Patient questions and concerns were answered and addressed. Patient demonstrated understanding. Informed patient that medication list may change prior to discharge. Patient provided with educational handout.     Tarun Colin, Emanuel  Clinical Pharmacy Specialist  [id number]

## 2022-12-14 NOTE — PROGRESS NOTE ADULT - SUBJECTIVE AND OBJECTIVE BOX
Follow Up:      Inverval History/ROS:Patient is a 32y old  Male who presents with a chief complaint of Monitor for cauda equina syndrome (14 Dec 2022 14:09)    Walking without difficulty.      Allergies    Ceclor (Unknown)  Toradol (Anaphylaxis; Swelling)  Toradol (Swelling)    Intolerances        ANTIMICROBIALS:  bictegravir 50 mG/emtricitabine 200 mG/tenofovir alafenamide 25 mG (BIKTARVY) 1 daily  trimethoprim  160 mG/sulfamethoxazole 800 mG 1 <User Schedule>      OTHER MEDS:  acetaminophen     Tablet .. 650 milliGRAM(s) Oral every 6 hours PRN  baclofen 5 milliGRAM(s) Oral every 8 hours PRN  cyanocobalamin 1000 MICROGram(s) Oral daily  enoxaparin Injectable 40 milliGRAM(s) SubCutaneous every 24 hours  fluticasone propionate 50 MICROgram(s)/spray Nasal Spray 1 Spray(s) Both Nostrils two times a day  influenza   Vaccine 0.5 milliLiter(s) IntraMuscular once  magnesium oxide 400 milliGRAM(s) Oral three times a day with meals  melatonin 3 milliGRAM(s) Oral at bedtime PRN  nicotine -  14 mG/24Hr(s) Patch 1 Patch Transdermal daily  pregabalin 50 milliGRAM(s) Oral three times a day  QUEtiapine 100 milliGRAM(s) Oral daily  QUEtiapine 300 milliGRAM(s) Oral at bedtime      Vital Signs Last 24 Hrs  T(C): 36.4 (14 Dec 2022 12:40), Max: 36.7 (13 Dec 2022 20:32)  T(F): 97.6 (14 Dec 2022 12:40), Max: 98.1 (13 Dec 2022 20:32)  HR: 96 (14 Dec 2022 12:40) (88 - 96)  BP: 120/77 (14 Dec 2022 12:40) (120/77 - 139/80)  BP(mean): --  RR: 18 (14 Dec 2022 12:40) (17 - 19)  SpO2: 100% (14 Dec 2022 12:40) (100% - 100%)    Parameters below as of 14 Dec 2022 12:40  Patient On (Oxygen Delivery Method): room air        PHYSICAL EXAM:  General: [x ] non-toxic  HEAD/EYES: [ ] PERRL [x ] white sclera [ ] icterus  ENT:  [ ] normal [ ] supple [ ] thrush [ ] pharyngeal exudate  Cardiovascular:   [ ] murmur [ ] normal [ ] PPM/AICD  Respiratory:  [ x] clear to ausculation bilaterally  GI:  [ x] soft, non-tender, normal bowel sounds  :  [ ] gonzales [ ] no CVA tenderness   Musculoskeletal:  [ ] no synovitis  Neurologic:  [x ] non-focal exam   Skin:  [ x] no rash  Lymph: [x ] no lymphadenopathy  Psychiatric:  [ x] appropriate affect [ ] alert & oriented  Lines:  x[ ] no phlebitis [ ] central line                                11.7   4.26  )-----------( 202      ( 14 Dec 2022 06:20 )             37.6       12-14    140  |  106  |  10  ----------------------------<  92  4.4   |  22  |  0.75    Ca    9.4      14 Dec 2022 06:20  Phos  4.8     12-14  Mg     1.50     12-14            MICROBIOLOGY:Culture Results:   <10,000 CFU/mL Normal Urogenital Jeimy (12-07-22 @ 18:53)      RADIOLOGY:

## 2022-12-14 NOTE — DIETITIAN INITIAL EVALUATION ADULT - ORAL INTAKE PTA/DIET HISTORY
Patient reports appetite has been good, consumes 3 meals daily. Denies any diet followed PTA. Patient reports unintentional weight loss of ~40lbs over past 1 yr.

## 2022-12-14 NOTE — DIETITIAN INITIAL EVALUATION ADULT - ADD RECOMMEND
1) Encourage PO intake and honor food preferences as able.   2) Recommend add Ensure Plus HP 2x daily (350cal, 20gm pro each).   3) Monitor PO intake, Labs, weights, BMs, and skin integrity.   4) RD to remain available for further nutritional interventions as indicated.  1) Encourage PO intake and honor food preferences as able.   2) Recommend add Ensure Plus HP 1x daily (350cal, 20gm pro each).   3) Monitor PO intake, Labs, weights, BMs, and skin integrity.   4) RD to remain available for further nutritional interventions as indicated.

## 2022-12-14 NOTE — PROGRESS NOTE ADULT - ASSESSMENT
32 year old living with HIV, presents with lower extremity weakness.     1) HIV  Ptt states he wants to follow up with Dr. Hunter but has been unable  [facility]    He is on Biktarvy with a high viral load.  I am concerned about adherence.  Initially he stated he only missed taking his Biktarvy since wednesday, then he states he has missed a few doses but not a lot.    genosure sent in 8/2022 - wound not predict resistance to Biktarvy    CD4 less than 200 and viral load at 391 K    I would continue biktarvy at this time.  Repeat a viral load around 12/22    If VL is not better at that time, would repeat genosure    Bactrim DS 3x/ week on Mon/ Wed/ Friday    2) COVID  notes mild uri symptoms    3) Lower extremity weakness  Pt has multiple prior admissions with LE weakness  There has been concern for HIV myelopathy in the past.   During prior admissions, his LE strength has improved over a few days without explanation  ? component of behavioral health  Monitor closely for change in clinical status    See ama note from 9/15/2022    4) Denies substance use  tox screen positive for cocaine    Follow up with ID as an outpt [phone]  Call ID service with questions

## 2022-12-14 NOTE — PROGRESS NOTE ADULT - PROBLEM SELECTOR PLAN 1
- B/l LE weakness, hyperreflexia, spasticity, positive Babinski concerning for myelopathy possibly from HIV; able to move all extremities, strength 3/5 strength in LE bilaterally    - Neurology consult appreciated. Given multiple prior MRIs of the spine and progression of prior symptoms over a span of 4 years, no further inpatient imaging recommended  - PT/OT/SW appreciated. Pending BRAYDEN  - Continue Home medications for pain  - Vit B12 nl, folate nl.   - B12 nl, folate nl , CK nl , ESR nl, CRP nl,   - Outpatient follow up with Dr Raman Tapia or Dr Des Garcia or if insurance is an issue may go to Neuro clinic  at [facility] 4th floor  - Vitamin b12 1000mg qD

## 2022-12-14 NOTE — DIETITIAN INITIAL EVALUATION ADULT - OTHER INFO
Per chart review, 31 yo male PMHx of congenital HIV c/b myelopathy, GBS, and recently resolved COVID infection presenting with leg weakness bilaterally, evaluated by Neurology and ID. Now medically optimized, awaiting placement.     Patient seen at bedside. Reports appetite has been good in the hospital, able to [id number]% of his meals most of the time. Patient reports he usually orders double entree. Food preferences honored. Patient is amenable to Ensure Plus x 2 times daily (700kcal, 26gm pro). Denies any GI issues (nausea/vomiting/diarrhea/constipation.) Last BM yesterday(12/13). Labs notable with low Mag, ordered magnesium oxide; phos 4.8H, will continue monitor electrolytes. Patient noted with 1+ b/l LE edema per RN flowsheet which is masking his weight loss.    Per chart review, 31 yo male PMHx of congenital HIV c/b myelopathy, GBS, and recently resolved COVID infection presenting with leg weakness bilaterally, evaluated by Neurology and ID. Now medically optimized, awaiting placement.     Patient seen at bedside. Reports appetite has been good in the hospital, able to [id number]% of his meals most of the time. Patient reports he usually orders double entree. Food preferences honored, will provide double portions of entree at all meals. Patient is amenable to Ensure Plus HP 1x daily (350cal, 20gm pro each) for trial. Denies any GI issues (nausea/vomiting/diarrhea/constipation.) Last BM yesterday(12/13). Labs notable with low Mag, ordered magnesium oxide; phos 4.8H, will continue monitor electrolytes. Patient noted with 1+ b/l LE edema per RN flowsheet which is masking his weight loss.

## 2022-12-15 LAB — SARS-COV-2 RNA SPEC QL NAA+PROBE: SIGNIFICANT CHANGE UP

## 2022-12-15 PROCEDURE — 99232 SBSQ HOSP IP/OBS MODERATE 35: CPT

## 2022-12-15 RX ORDER — BACLOFEN 100 %
5 POWDER (GRAM) MISCELLANEOUS EVERY 8 HOURS
Refills: 0 | Status: DISCONTINUED | OUTPATIENT
Start: 2022-12-15 | End: 2022-12-17

## 2022-12-15 RX ADMIN — QUETIAPINE FUMARATE 300 MILLIGRAM(S): 200 TABLET, FILM COATED ORAL at 22:27

## 2022-12-15 RX ADMIN — Medication 50 MILLIGRAM(S): at 22:31

## 2022-12-15 RX ADMIN — BICTEGRAVIR SODIUM, EMTRICITABINE, AND TENOFOVIR ALAFENAMIDE FUMARATE 1 TABLET(S): 30; 120; 15 TABLET ORAL at 13:03

## 2022-12-15 RX ADMIN — Medication 50 MILLIGRAM(S): at 05:52

## 2022-12-15 RX ADMIN — Medication 50 MILLIGRAM(S): at 13:04

## 2022-12-15 RX ADMIN — QUETIAPINE FUMARATE 100 MILLIGRAM(S): 200 TABLET, FILM COATED ORAL at 13:04

## 2022-12-15 RX ADMIN — MAGNESIUM OXIDE 400 MG ORAL TABLET 400 MILLIGRAM(S): 241.3 TABLET ORAL at 13:04

## 2022-12-15 RX ADMIN — PREGABALIN 1000 MICROGRAM(S): 225 CAPSULE ORAL at 13:04

## 2022-12-15 NOTE — PROGRESS NOTE ADULT - PROBLEM SELECTOR PLAN 1
- B/l LE weakness, hyperreflexia, spasticity, positive Babinski concerning for myelopathy possibly from HIV; able to move all extremities, strength 3/5 strength in LE bilaterally    - Neurology consult appreciated. Given multiple prior MRIs of the spine and progression of prior symptoms over a span of 4 years, no further inpatient imaging recommended  - PT/OT/SW appreciated. Pending BRAYDEN  - Continue Home medications for pain  - Vit B12 nl, folate nl.   - B12 nl, folate nl , CK nl , ESR nl, CRP nl,   - Outpatient follow up with Dr Raman Tapia or Dr Des Garcia or if insurance is an issue may go to Neuro clinic  at [facility] 4th floor  - Vitamin b12 1000mg qD

## 2022-12-15 NOTE — PROGRESS NOTE ADULT - SUBJECTIVE AND OBJECTIVE BOX
Patient is a 32y old  Male who presents with a chief complaint of Monitor for cauda equina syndrome (14 Dec 2022 17:34)    SUBJECTIVE / OVERNIGHT EVENTS: No acute events. Denies pain or complaints. Awaiting rehab placement.    MEDICATIONS  (STANDING):  bictegravir 50 mG/emtricitabine 200 mG/tenofovir alafenamide 25 mG (BIKTARVY) 1 Tablet(s) Oral daily  cyanocobalamin 1000 MICROGram(s) Oral daily  enoxaparin Injectable 40 milliGRAM(s) SubCutaneous every 24 hours  fluticasone propionate 50 MICROgram(s)/spray Nasal Spray 1 Spray(s) Both Nostrils two times a day  influenza   Vaccine 0.5 milliLiter(s) IntraMuscular once  magnesium oxide 400 milliGRAM(s) Oral three times a day with meals  nicotine -  14 mG/24Hr(s) Patch 1 Patch Transdermal daily  pregabalin 50 milliGRAM(s) Oral three times a day  QUEtiapine 100 milliGRAM(s) Oral daily  QUEtiapine 300 milliGRAM(s) Oral at bedtime  trimethoprim  160 mG/sulfamethoxazole 800 mG 1 Tablet(s) Oral <User Schedule>    MEDICATIONS  (PRN):  acetaminophen     Tablet .. 650 milliGRAM(s) Oral every 6 hours PRN Temp greater or equal to 38C (100.4F), Mild Pain (1 - 3)  baclofen 5 milliGRAM(s) Oral every 8 hours PRN Muscle Spasm or pain  melatonin 3 milliGRAM(s) Oral at bedtime PRN Insomnia    CAPILLARY BLOOD GLUCOSE    I&O's Summary    PHYSICAL EXAM:  Vital Signs Last 24 Hrs  T(C): 36.8 (15 Dec 2022 05:54), Max: 36.8 (15 Dec 2022 05:54)  T(F): 98.3 (15 Dec 2022 05:54), Max: 98.3 (15 Dec 2022 05:54)  HR: 69 (15 Dec 2022 05:54) (69 - 100)  BP: 120/74 (15 Dec 2022 05:54) (120/74 - 137/82)  BP(mean): --  RR: 16 (15 Dec 2022 05:54) (16 - 19)  SpO2: 99% (15 Dec 2022 05:54) (99% - 100%)    Parameters below as of 15 Dec 2022 05:54  Patient On (Oxygen Delivery Method): room air    CONSTITUTIONAL: NAD, well-developed, well-groomed  EYES: conjunctiva and sclera clear  ENMT: Moist oral mucosa  RESPIRATORY: Normal respiratory effort; lungs are clear to auscultation bilaterally  CARDIOVASCULAR: Regular rate and rhythm, normal S1 and S2, no murmur/rub/gallop; No lower extremity edema; Peripheral pulses are 2+ bilaterally  ABDOMEN: Nontender to palpation, normoactive bowel sounds, no rebound/guarding  SKIN: No rashes; no palpable lesions    LABS:                        11.7   4.26  )-----------( 202      ( 14 Dec 2022 06:20 )             37.6     12-14    140  |  106  |  10  ----------------------------<  92  4.4   |  22  |  0.75    Ca    9.4      14 Dec 2022 06:20  Phos  4.8     12-14  Mg     1.50     12-14    RADIOLOGY & ADDITIONAL TESTS: Reviewed    COORDINATION OF CARE:  Care Discussed with Consultants/Other Providers [Y- Medicine ACP]

## 2022-12-15 NOTE — PROGRESS NOTE ADULT - PROBLEM SELECTOR PLAN 2
- Viral load now elevated at [phone] with CD4 count 145   - concern for noncompliance with medications  - Appreciate ID input, patient well known to service and patient is non-compliant with medications and does not follow up with ID; states that patient will need repeat VL in 2 weeks; if VL still remains elevated will obtain genosure at that time  - continue with home HARRT medications; Biktarvy  - given CD4 count will start on Bactrim for PCP ppx    - Compliance will continued to be reinforced with patient.    #Recent COVID Infection - per infection control guidelines should maintain isolation for ~21 days following infection (initial positive on 11/22) - Currently testing negative, asymptomatic

## 2022-12-16 PROCEDURE — 99232 SBSQ HOSP IP/OBS MODERATE 35: CPT

## 2022-12-16 RX ADMIN — BICTEGRAVIR SODIUM, EMTRICITABINE, AND TENOFOVIR ALAFENAMIDE FUMARATE 1 TABLET(S): 30; 120; 15 TABLET ORAL at 13:01

## 2022-12-16 RX ADMIN — Medication 50 MILLIGRAM(S): at 13:02

## 2022-12-16 RX ADMIN — Medication 50 MILLIGRAM(S): at 21:12

## 2022-12-16 RX ADMIN — Medication 50 MILLIGRAM(S): at 06:15

## 2022-12-16 RX ADMIN — Medication 1 TABLET(S): at 13:02

## 2022-12-16 RX ADMIN — QUETIAPINE FUMARATE 100 MILLIGRAM(S): 200 TABLET, FILM COATED ORAL at 13:01

## 2022-12-16 RX ADMIN — QUETIAPINE FUMARATE 300 MILLIGRAM(S): 200 TABLET, FILM COATED ORAL at 21:12

## 2022-12-16 RX ADMIN — PREGABALIN 1000 MICROGRAM(S): 225 CAPSULE ORAL at 13:02

## 2022-12-16 NOTE — PROGRESS NOTE ADULT - PROBLEM SELECTOR PROBLEM 2
HIV disease

## 2022-12-16 NOTE — PROGRESS NOTE ADULT - PROBLEM SELECTOR PROBLEM 1
Lower extremity weakness

## 2022-12-16 NOTE — PROGRESS NOTE ADULT - REASON FOR ADMISSION
Monitor for cauda equina syndrome

## 2022-12-16 NOTE — PROGRESS NOTE ADULT - SUBJECTIVE AND OBJECTIVE BOX
Patient is a 32y old  Male who presents with a chief complaint of Monitor for cauda equina syndrome (15 Dec 2022 12:50)    SUBJECTIVE / OVERNIGHT EVENTS: No acute events. Comfortable. No back pain. Still with LE weakness bilaterally. No nausea, vomiting, chest pain, shortness of breath, fever, chills.    MEDICATIONS  (STANDING):  bictegravir 50 mG/emtricitabine 200 mG/tenofovir alafenamide 25 mG (BIKTARVY) 1 Tablet(s) Oral daily  cyanocobalamin 1000 MICROGram(s) Oral daily  enoxaparin Injectable 40 milliGRAM(s) SubCutaneous every 24 hours  fluticasone propionate 50 MICROgram(s)/spray Nasal Spray 1 Spray(s) Both Nostrils two times a day  influenza   Vaccine 0.5 milliLiter(s) IntraMuscular once  nicotine -  14 mG/24Hr(s) Patch 1 Patch Transdermal daily  pregabalin 50 milliGRAM(s) Oral three times a day  QUEtiapine 100 milliGRAM(s) Oral daily  QUEtiapine 300 milliGRAM(s) Oral at bedtime  trimethoprim  160 mG/sulfamethoxazole 800 mG 1 Tablet(s) Oral <User Schedule>    MEDICATIONS  (PRN):  acetaminophen     Tablet .. 650 milliGRAM(s) Oral every 6 hours PRN Temp greater or equal to 38C (100.4F), Mild Pain (1 - 3)  baclofen 5 milliGRAM(s) Oral every 8 hours PRN Muscle Spasm or pain  melatonin 3 milliGRAM(s) Oral at bedtime PRN Insomnia    CAPILLARY BLOOD GLUCOSE    I&O's Summary    15 Dec 2022 07:01  -  16 Dec 2022 07:00  --------------------------------------------------------  IN: 0 mL / OUT: 1000 mL / NET: -1000 mL    PHYSICAL EXAM:  Vital Signs Last 24 Hrs  T(C): 36.8 (16 Dec 2022 12:33), Max: 36.8 (16 Dec 2022 12:33)  T(F): 98.2 (16 Dec 2022 12:33), Max: 98.2 (16 Dec 2022 12:33)  HR: 85 (16 Dec 2022 12:33) (66 - 95)  BP: 119/63 (16 Dec 2022 12:33) (112/67 - 143/85)  BP(mean): --  RR: 18 (16 Dec 2022 12:33) (18 - 18)  SpO2: 100% (16 Dec 2022 12:33) (100% - 100%)    Parameters below as of 16 Dec 2022 12:33  Patient On (Oxygen Delivery Method): room air    CONSTITUTIONAL: NAD, well-developed, well-groomed  EYES: conjunctiva and sclera clear  ENMT: Moist oral mucosa  RESPIRATORY: Normal respiratory effort; lungs are clear to auscultation bilaterally  CARDIOVASCULAR: Regular rate and rhythm, normal S1 and S2, no murmur/rub/gallop; No lower extremity edema; Peripheral pulses are 2+ bilaterally  ABDOMEN: Nontender to palpation, normoactive bowel sounds, no rebound/guarding  NEURO: 3/5 LLE strength, 4/5 RLE strength, intact sensation to light touch  SKIN: No rashes; no palpable lesions    LABS, RADIOLOGY & ADDITIONAL TESTS: Reviewed    COORDINATION OF CARE:  Care Discussed with Consultants/Other Providers [Y- Medicine ACP, SW]

## 2022-12-16 NOTE — PROGRESS NOTE ADULT - PROVIDER SPECIALTY LIST ADULT
Hospitalist
Infectious Disease
Hospitalist
Infectious Disease
Internal Medicine
Internal Medicine
Hospitalist
Internal Medicine
Hospitalist

## 2022-12-16 NOTE — PROGRESS NOTE ADULT - PROBLEM SELECTOR PLAN 1
- B/l LE weakness, hyperreflexia, spasticity, positive Babinski concerning for myelopathy possibly from HIV; able to move all extremities, strength 3/5 strength in LE bilaterally    - Neurology consult appreciated. Given multiple prior MRIs of the spine and progression of prior symptoms over a span of 4 years, no further inpatient imaging recommended  - PT/OT/SW appreciated. Pending BRAYDEN  - Continue Home medications for pain  - Vit B12 nl, folate nl.   - B12 nl, folate nl , CK nl , ESR nl, CRP nl,   - Outpatient follow up with Dr Raman Tapia or Dr Des Garcia or if insurance is an issue may go to Neuro clinic  at [facility] 4th floor  - Vitamin b12 1000mg qD

## 2022-12-16 NOTE — PROGRESS NOTE ADULT - PROBLEM SELECTOR PLAN 2
- Viral load now elevated at [phone] with CD4 count 145   - concern for noncompliance with medications  - Appreciate ID input, patient well known to service and patient is non-compliant with medications and does not follow up with ID; states that patient will need repeat VL in 2 weeks; if VL still remains elevated will obtain genosure at that time  - continue with home HARRT medications; Biktarvy  - given CD4 count will start on Bactrim for PCP ppx    - Compliance will continued to be reinforced with patient.    #Recent COVID Infection - per infection control guidelines should maintain isolation for ~21 days following infection (initial positive on 11/22) - Currently testing negative, asymptomatic, isolation precautions removed.

## 2022-12-17 VITALS
SYSTOLIC BLOOD PRESSURE: 114 MMHG | TEMPERATURE: 98 F | HEART RATE: 84 BPM | OXYGEN SATURATION: 100 % | DIASTOLIC BLOOD PRESSURE: 61 MMHG | RESPIRATION RATE: 18 BRPM

## 2022-12-17 PROCEDURE — 99239 HOSP IP/OBS DSCHRG MGMT >30: CPT

## 2022-12-17 RX ORDER — QUETIAPINE FUMARATE 200 MG/1
1 TABLET, FILM COATED ORAL
Qty: 14 | Refills: 0
Start: 2022-12-17 | End: 2022-12-30

## 2022-12-17 RX ORDER — NICOTINE POLACRILEX 2 MG
1 GUM BUCCAL
Qty: 0 | Refills: 0 | DISCHARGE
Start: 2022-12-17

## 2022-12-17 RX ORDER — PREGABALIN 225 MG/1
1 CAPSULE ORAL
Qty: 30 | Refills: 0
Start: 2022-12-17 | End: 2023-01-15

## 2022-12-17 RX ORDER — BICTEGRAVIR SODIUM, EMTRICITABINE, AND TENOFOVIR ALAFENAMIDE FUMARATE 30; 120; 15 MG/1; MG/1; MG/1
1 TABLET ORAL
Qty: 14 | Refills: 0
Start: 2022-12-17 | End: 2022-12-30

## 2022-12-17 RX ADMIN — Medication 50 MILLIGRAM(S): at 06:22

## 2022-12-17 RX ADMIN — Medication 1 SPRAY(S): at 06:28

## 2022-12-17 NOTE — CHART NOTE - NSCHARTNOTEFT_GEN_A_CORE
Patient walked out this morning, leaving against medical advice, prior to risk/benefit discussion (refused to wait for provider to arrive to bedside). Attending MD aware. Formal discharge paperwork to follow.

## 2023-01-03 ENCOUNTER — INPATIENT (INPATIENT)
Facility: HOSPITAL | Age: 33
LOS: 3 days | Discharge: LEFT BEFORE DISCHARGE | End: 2023-01-07
Attending: STUDENT IN AN ORGANIZED HEALTH CARE EDUCATION/TRAINING PROGRAM | Admitting: STUDENT IN AN ORGANIZED HEALTH CARE EDUCATION/TRAINING PROGRAM
Payer: MEDICAID

## 2023-01-03 VITALS
DIASTOLIC BLOOD PRESSURE: 90 MMHG | RESPIRATION RATE: 16 BRPM | HEIGHT: 72 IN | SYSTOLIC BLOOD PRESSURE: 131 MMHG | TEMPERATURE: 98 F | OXYGEN SATURATION: 100 % | HEART RATE: 96 BPM

## 2023-01-03 DIAGNOSIS — Z98.890 OTHER SPECIFIED POSTPROCEDURAL STATES: Chronic | ICD-10-CM

## 2023-01-03 PROCEDURE — 99285 EMERGENCY DEPT VISIT HI MDM: CPT
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
## 2023-01-04 DIAGNOSIS — S02.30XA FRACTURE OF ORBITAL FLOOR, UNSPECIFIED SIDE, INITIAL ENCOUNTER FOR CLOSED FRACTURE: ICD-10-CM

## 2023-01-04 DIAGNOSIS — B20 HUMAN IMMUNODEFICIENCY VIRUS [HIV] DISEASE: ICD-10-CM

## 2023-01-04 DIAGNOSIS — R29.898 OTHER SYMPTOMS AND SIGNS INVOLVING THE MUSCULOSKELETAL SYSTEM: ICD-10-CM

## 2023-01-04 DIAGNOSIS — Z29.9 ENCOUNTER FOR PROPHYLACTIC MEASURES, UNSPECIFIED: ICD-10-CM

## 2023-01-04 PROBLEM — G61.0 GUILLAIN-BARRE SYNDROME: Chronic | Status: ACTIVE | Noted: 2022-12-07

## 2023-01-04 LAB
ALBUMIN SERPL ELPH-MCNC: 4.5 G/DL — SIGNIFICANT CHANGE UP (ref 3.3–5)
ALP SERPL-CCNC: 64 U/L — SIGNIFICANT CHANGE UP (ref 40–120)
ALT FLD-CCNC: 17 U/L — SIGNIFICANT CHANGE UP (ref 4–41)
ANION GAP SERPL CALC-SCNC: 16 MMOL/L — HIGH (ref 7–14)
AST SERPL-CCNC: 29 U/L — SIGNIFICANT CHANGE UP (ref 4–40)
BASOPHILS # BLD AUTO: 0.04 K/UL — SIGNIFICANT CHANGE UP (ref 0–0.2)
BASOPHILS NFR BLD AUTO: 0.3 % — SIGNIFICANT CHANGE UP (ref 0–2)
BILIRUB SERPL-MCNC: 0.9 MG/DL — SIGNIFICANT CHANGE UP (ref 0.2–1.2)
BUN SERPL-MCNC: 19 MG/DL — SIGNIFICANT CHANGE UP (ref 7–23)
CALCIUM SERPL-MCNC: 9.3 MG/DL — SIGNIFICANT CHANGE UP (ref 8.4–10.5)
CHLORIDE SERPL-SCNC: 95 MMOL/L — LOW (ref 98–107)
CO2 SERPL-SCNC: 23 MMOL/L — SIGNIFICANT CHANGE UP (ref 22–31)
CREAT SERPL-MCNC: 0.9 MG/DL — SIGNIFICANT CHANGE UP (ref 0.5–1.3)
EGFR: 116 ML/MIN/1.73M2 — SIGNIFICANT CHANGE UP
EOSINOPHIL # BLD AUTO: 0.02 K/UL — SIGNIFICANT CHANGE UP (ref 0–0.5)
EOSINOPHIL NFR BLD AUTO: 0.2 % — SIGNIFICANT CHANGE UP (ref 0–6)
FLUAV AG NPH QL: SIGNIFICANT CHANGE UP
FLUBV AG NPH QL: SIGNIFICANT CHANGE UP
GLUCOSE SERPL-MCNC: 96 MG/DL — SIGNIFICANT CHANGE UP (ref 70–99)
HCT VFR BLD CALC: 39.4 % — SIGNIFICANT CHANGE UP (ref 39–50)
HGB BLD-MCNC: 12.4 G/DL — LOW (ref 13–17)
IANC: 8.24 K/UL — HIGH (ref 1.8–7.4)
IMM GRANULOCYTES NFR BLD AUTO: 0.3 % — SIGNIFICANT CHANGE UP (ref 0–0.9)
LYMPHOCYTES # BLD AUTO: 26.7 % — SIGNIFICANT CHANGE UP (ref 13–44)
LYMPHOCYTES # BLD AUTO: 3.54 K/UL — HIGH (ref 1–3.3)
MCHC RBC-ENTMCNC: 28.3 PG — SIGNIFICANT CHANGE UP (ref 27–34)
MCHC RBC-ENTMCNC: 31.5 GM/DL — LOW (ref 32–36)
MCV RBC AUTO: 90 FL — SIGNIFICANT CHANGE UP (ref 80–100)
MONOCYTES # BLD AUTO: 1.36 K/UL — HIGH (ref 0–0.9)
MONOCYTES NFR BLD AUTO: 10.3 % — SIGNIFICANT CHANGE UP (ref 2–14)
NEUTROPHILS # BLD AUTO: 8.24 K/UL — HIGH (ref 1.8–7.4)
NEUTROPHILS NFR BLD AUTO: 62.2 % — SIGNIFICANT CHANGE UP (ref 43–77)
NRBC # BLD: 0 /100 WBCS — SIGNIFICANT CHANGE UP (ref 0–0)
NRBC # FLD: 0 K/UL — SIGNIFICANT CHANGE UP (ref 0–0)
PLATELET # BLD AUTO: 252 K/UL — SIGNIFICANT CHANGE UP (ref 150–400)
POTASSIUM SERPL-MCNC: 4.4 MMOL/L — SIGNIFICANT CHANGE UP (ref 3.5–5.3)
POTASSIUM SERPL-SCNC: 4.4 MMOL/L — SIGNIFICANT CHANGE UP (ref 3.5–5.3)
PROT SERPL-MCNC: 8.3 G/DL — SIGNIFICANT CHANGE UP (ref 6–8.3)
RBC # BLD: 4.38 M/UL — SIGNIFICANT CHANGE UP (ref 4.2–5.8)
RBC # FLD: 13.2 % — SIGNIFICANT CHANGE UP (ref 10.3–14.5)
RSV RNA NPH QL NAA+NON-PROBE: SIGNIFICANT CHANGE UP
SARS-COV-2 RNA SPEC QL NAA+PROBE: SIGNIFICANT CHANGE UP
SODIUM SERPL-SCNC: 134 MMOL/L — LOW (ref 135–145)
WBC # BLD: 13.24 K/UL — HIGH (ref 3.8–10.5)
WBC # FLD AUTO: 13.24 K/UL — HIGH (ref 3.8–10.5)

## 2023-01-04 PROCEDURE — 70450 CT HEAD/BRAIN W/O DYE: CPT | Mod: 26,MA

## 2023-01-04 PROCEDURE — 70486 CT MAXILLOFACIAL W/O DYE: CPT | Mod: 26,MA

## 2023-01-04 PROCEDURE — 99223 1ST HOSP IP/OBS HIGH 75: CPT | Mod: GC

## 2023-01-04 RX ORDER — QUETIAPINE FUMARATE 200 MG/1
100 TABLET, FILM COATED ORAL DAILY
Refills: 0 | Status: DISCONTINUED | OUTPATIENT
Start: 2023-01-04 | End: 2023-01-07

## 2023-01-04 RX ORDER — INFLUENZA VIRUS VACCINE 15; 15; 15; 15 UG/.5ML; UG/.5ML; UG/.5ML; UG/.5ML
0.5 SUSPENSION INTRAMUSCULAR ONCE
Refills: 0 | Status: DISCONTINUED | OUTPATIENT
Start: 2023-01-04 | End: 2023-01-07

## 2023-01-04 RX ORDER — ENOXAPARIN SODIUM 100 MG/ML
40 INJECTION SUBCUTANEOUS EVERY 24 HOURS
Refills: 0 | Status: DISCONTINUED | OUTPATIENT
Start: 2023-01-04 | End: 2023-01-07

## 2023-01-04 RX ORDER — QUETIAPINE FUMARATE 200 MG/1
300 TABLET, FILM COATED ORAL AT BEDTIME
Refills: 0 | Status: DISCONTINUED | OUTPATIENT
Start: 2023-01-04 | End: 2023-01-07

## 2023-01-04 RX ORDER — BICTEGRAVIR SODIUM, EMTRICITABINE, AND TENOFOVIR ALAFENAMIDE FUMARATE 30; 120; 15 MG/1; MG/1; MG/1
1 TABLET ORAL DAILY
Refills: 0 | Status: DISCONTINUED | OUTPATIENT
Start: 2023-01-04 | End: 2023-01-07

## 2023-01-04 RX ORDER — ACETAMINOPHEN 500 MG
1000 TABLET ORAL ONCE
Refills: 0 | Status: COMPLETED | OUTPATIENT
Start: 2023-01-04 | End: 2023-01-04

## 2023-01-04 RX ORDER — ACETAMINOPHEN 500 MG
650 TABLET ORAL EVERY 6 HOURS
Refills: 0 | Status: DISCONTINUED | OUTPATIENT
Start: 2023-01-04 | End: 2023-01-07

## 2023-01-04 RX ORDER — BACLOFEN 100 %
5 POWDER (GRAM) MISCELLANEOUS EVERY 8 HOURS
Refills: 0 | Status: DISCONTINUED | OUTPATIENT
Start: 2023-01-04 | End: 2023-01-07

## 2023-01-04 RX ORDER — LANOLIN ALCOHOL/MO/W.PET/CERES
3 CREAM (GRAM) TOPICAL AT BEDTIME
Refills: 0 | Status: DISCONTINUED | OUTPATIENT
Start: 2023-01-04 | End: 2023-01-07

## 2023-01-04 RX ADMIN — Medication 50 MILLIGRAM(S): at 21:33

## 2023-01-04 RX ADMIN — Medication 400 MILLIGRAM(S): at 10:25

## 2023-01-04 RX ADMIN — QUETIAPINE FUMARATE 300 MILLIGRAM(S): 200 TABLET, FILM COATED ORAL at 21:33

## 2023-01-04 RX ADMIN — Medication 5 MILLIGRAM(S): at 20:07

## 2023-01-04 RX ADMIN — Medication 1 TABLET(S): at 19:09

## 2023-01-04 RX ADMIN — BICTEGRAVIR SODIUM, EMTRICITABINE, AND TENOFOVIR ALAFENAMIDE FUMARATE 1 TABLET(S): 30; 120; 15 TABLET ORAL at 21:34

## 2023-01-04 NOTE — H&P ADULT - PROBLEM SELECTOR PLAN 1
Pt with LE weakness, reports worse than baseline. No evidence of saddle anesthesia or sensory deficit to suggest cord compression. Multiple prior MRI without evidence of compression or abnormal cord enhancement  - continue lyrica and baclofen  - PT consult for eventual rehab placement

## 2023-01-04 NOTE — H&P ADULT - PROBLEM SELECTOR PLAN 2
Pt with CD4 count of 145 during last admission  - continue biktarvy, bactrim for PCP ppx  - check VL and CD4 count

## 2023-01-04 NOTE — H&P ADULT - PROBLEM SELECTOR PLAN 3
Pt with left orbital floor fracture, no evidence of entrapment, also with nasal bone fracture  - appreciate ophtho and OMFS recs  - augmentin BID  - cold compresses  - no nose blowing  - no acute surgical intervention
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
## 2023-01-04 NOTE — H&P ADULT - MUSCULOSKELETAL COMMENTS
3/5 strength b/l proximal leg strength to flexion and extension; 5/5 strength to flexion and extension at the knee, plantarflexion,/extension
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
## 2023-01-04 NOTE — H&P ADULT - ASSESSMENT
32M with congenital HIV with myelopathy, GBS, presents with chronic LE weakness and acute left orbital fracture

## 2023-01-04 NOTE — PATIENT PROFILE ADULT - FUNCTIONAL ASSESSMENT - BASIC MOBILITY 6.
3-calculated by average/Not able to assess (calculate score using [facility] averaging method)

## 2023-01-04 NOTE — PATIENT PROFILE ADULT - FALL HARM RISK - HARM RISK INTERVENTIONS

## 2023-01-04 NOTE — ED ADULT NURSE NOTE - OBJECTIVE STATEMENT
pt presents to ED A&04, normally ambulatory coming in complainign of difficulty walking due to weakness and pain in bilateral lower extremity. Patient has had this pain x4 months. Patient endoreses numbness and tingling in bilateral LE. PMH of guillian barre syndrome, HIV. Respirations even and unlabored. Lung sounds clear with equal chest rise bilaterally. ABD is soft, non tender, non distended with normal active bowel sounds No complaints of chest pain, headache, nausea, dizziness, vomiting  SOB, fever, chills verbalized. pt presents to ED A&04, normally ambulatory coming in complainign of difficulty walking due to weakness and pain in bilateral lower extremity. Patient has had this pain x4 months. Patient endoreses numbness and tingling in bilateral LE. PMH of guillian barre syndrome, HIV. Respirations even and unlabored. Lung sounds clear with equal chest rise bilaterally. ABD is soft, non tender, non distended with normal active bowel sounds No complaints of chest pain, headache, nausea, dizziness, vomiting  SOB, fever, chills verbalized. 22GLAC in place, labs sent. awaiting CT

## 2023-01-04 NOTE — ED PROVIDER NOTE - COVID-19 RESULT
NEGATIVE Detail Level: Detailed Size Of Lesion: 1 X Size Of Lesion In Cm (Optional): 0.8 Incorporate Mauc In Note: Yes
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
## 2023-01-04 NOTE — ED PROVIDER NOTE - NS ED ROS FT
GENERAL: no fever, no chills, no weight loss  EYES: no change in vision, no irritation, no discharge, no redness, no pain  HEENT: no trouble swallowing or speaking, no throat pain, no ear pain  CARDIAC: no chest pain, no palpitations   PULMONARY: no cough, no shortness of breath, no wheezing  GI: no abdominal pain, no nausea, no vomiting, no diarrhea, no constipation, no melena, no hematochezia, no hematemesis  : no changes in urination, no dysuria, no frequency, no hematuria, no discharge  SKIN: no rashes  NEURO: no headache, no numbness, +LE weakness  MSK: +left sided facial pain, no joint pain, no muscle pain, no back pain, no calf pain
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
## 2023-01-04 NOTE — ED PROVIDER NOTE - PHYSICAL EXAMINATION
GENERAL: no acute distress, non-toxic appearing  HEAD: normocephalic, atraumatic  HEENT: +pain with left abduction in R eye, normal conjunctiva, oral mucosa moist, neck supple  CARDIAC: regular rate and rhythm, normal S1 and S2,  no appreciable murmurs  PULM: clear to ascultation bilaterally, no crackles, rales, rhonchi, or wheezing  GI: abdomen nondistended, soft, nontender, no guarding or rebound tenderness  : no CVA tenderness, no suprapubic tenderness  NEURO: alert and oriented x 3, normal speech, 1/5 LE strength, sensation diminished, hyperreflexic patellar reflex  MSK: no visible deformities, no peripheral edema, calf tenderness/redness/swelling  SKIN: no visible rashes, dry, well-perfused  PSYCH: appropriate mood and affect

## 2023-01-04 NOTE — CONSULT NOTE ADULT - ATTENDING COMMENTS
I have not seen or examined the patient, but patient should follow up with [facility] Ophthlamology within 1 week of discharge.
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
## 2023-01-04 NOTE — ED PROVIDER NOTE - NSICDXPASTMEDICALHX_GEN_ALL_CORE_FT
PAST MEDICAL HISTORY:  Asthma     Chronic sinusitis     Closed fracture of multiple ribs of right side, initial encounter     Cocaine abuse     GBS (Guillain [place] syndrome)     HIV (human immunodeficiency virus infection) from birth    Homeless     Prophylactic measure
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
## 2023-01-04 NOTE — ED PROVIDER NOTE - CLINICAL SUMMARY MEDICAL DECISION MAKING FREE TEXT BOX
32-year-old male history of congenital HIV complicated by myelopathy, GBS presents with leg weakness bilaterally that has been evaluated multiple times by neurology.  On prior admission patient was awaiting subacute rehab placement as this has been a chronic process.  Will scan CT head and CT maxillofacial and leg pain with extraocular movements intact conjunctival hemorrhage with tenderness to the maxilla to rule out fractures, entrapment 32-year-old male history of congenital HIV complicated by myelopathy, GBS presents with leg weakness bilaterally that has been evaluated multiple times by neurology.  On prior admission patient was awaiting subacute rehab placement as this has been a chronic process.  Will scan CT head and CT maxillofacial and leg pain with extraocular movements intact conjunctival hemorrhage with tenderness to the maxilla to rule out fractures, entrapment.    Dr. Guerrero: See attending attestation.

## 2023-01-04 NOTE — ED ADULT NURSE NOTE - CAS EDN DISCHARGE INTERVENTIONS
No. NITHYA screening performed.  STOP BANG Legend: 0-2 = LOW Risk; 3-4 = INTERMEDIATE Risk; 5-8 = HIGH Risk
IV intact

## 2023-01-04 NOTE — H&P ADULT - NSICDXPASTMEDICALHX_GEN_ALL_CORE_FT
PAST MEDICAL HISTORY:  Asthma     Chronic sinusitis     Closed fracture of multiple ribs of right side, initial encounter     Cocaine abuse     GBS (Guillain [place] syndrome)     HIV (human immunodeficiency virus infection) from birth    Homeless

## 2023-01-05 LAB
ANION GAP SERPL CALC-SCNC: 10 MMOL/L — SIGNIFICANT CHANGE UP (ref 7–14)
BUN SERPL-MCNC: 14 MG/DL — SIGNIFICANT CHANGE UP (ref 7–23)
CALCIUM SERPL-MCNC: 8.4 MG/DL — SIGNIFICANT CHANGE UP (ref 8.4–10.5)
CHLORIDE SERPL-SCNC: 102 MMOL/L — SIGNIFICANT CHANGE UP (ref 98–107)
CO2 SERPL-SCNC: 23 MMOL/L — SIGNIFICANT CHANGE UP (ref 22–31)
CREAT SERPL-MCNC: 0.81 MG/DL — SIGNIFICANT CHANGE UP (ref 0.5–1.3)
EGFR: 119 ML/MIN/1.73M2 — SIGNIFICANT CHANGE UP
GLUCOSE SERPL-MCNC: 111 MG/DL — HIGH (ref 70–99)
HCT VFR BLD CALC: 38.8 % — LOW (ref 39–50)
HGB BLD-MCNC: 12.4 G/DL — LOW (ref 13–17)
MAGNESIUM SERPL-MCNC: 1.7 MG/DL — SIGNIFICANT CHANGE UP (ref 1.6–2.6)
MCHC RBC-ENTMCNC: 29.4 PG — SIGNIFICANT CHANGE UP (ref 27–34)
MCHC RBC-ENTMCNC: 32 GM/DL — SIGNIFICANT CHANGE UP (ref 32–36)
MCV RBC AUTO: 91.9 FL — SIGNIFICANT CHANGE UP (ref 80–100)
NRBC # BLD: 0 /100 WBCS — SIGNIFICANT CHANGE UP (ref 0–0)
NRBC # FLD: 0 K/UL — SIGNIFICANT CHANGE UP (ref 0–0)
PHOSPHATE SERPL-MCNC: 4.2 MG/DL — SIGNIFICANT CHANGE UP (ref 2.5–4.5)
PLATELET # BLD AUTO: 225 K/UL — SIGNIFICANT CHANGE UP (ref 150–400)
POTASSIUM SERPL-MCNC: 3.5 MMOL/L — SIGNIFICANT CHANGE UP (ref 3.5–5.3)
POTASSIUM SERPL-SCNC: 3.5 MMOL/L — SIGNIFICANT CHANGE UP (ref 3.5–5.3)
RBC # BLD: 4.22 M/UL — SIGNIFICANT CHANGE UP (ref 4.2–5.8)
RBC # FLD: 13.2 % — SIGNIFICANT CHANGE UP (ref 10.3–14.5)
SODIUM SERPL-SCNC: 135 MMOL/L — SIGNIFICANT CHANGE UP (ref 135–145)
WBC # BLD: 3.76 K/UL — LOW (ref 3.8–10.5)
WBC # FLD AUTO: 3.76 K/UL — LOW (ref 3.8–10.5)

## 2023-01-05 PROCEDURE — 99232 SBSQ HOSP IP/OBS MODERATE 35: CPT

## 2023-01-05 RX ADMIN — Medication 50 MILLIGRAM(S): at 05:15

## 2023-01-05 RX ADMIN — Medication 1 DROP(S): at 13:04

## 2023-01-05 RX ADMIN — Medication 650 MILLIGRAM(S): at 22:27

## 2023-01-05 RX ADMIN — QUETIAPINE FUMARATE 300 MILLIGRAM(S): 200 TABLET, FILM COATED ORAL at 21:33

## 2023-01-05 RX ADMIN — Medication 1 TABLET(S): at 18:04

## 2023-01-05 RX ADMIN — Medication 3 MILLIGRAM(S): at 21:57

## 2023-01-05 RX ADMIN — Medication 50 MILLIGRAM(S): at 13:05

## 2023-01-05 RX ADMIN — QUETIAPINE FUMARATE 100 MILLIGRAM(S): 200 TABLET, FILM COATED ORAL at 13:05

## 2023-01-05 RX ADMIN — Medication 50 MILLIGRAM(S): at 21:33

## 2023-01-05 RX ADMIN — Medication 1 TABLET(S): at 05:16

## 2023-01-05 RX ADMIN — BICTEGRAVIR SODIUM, EMTRICITABINE, AND TENOFOVIR ALAFENAMIDE FUMARATE 1 TABLET(S): 30; 120; 15 TABLET ORAL at 13:05

## 2023-01-05 RX ADMIN — Medication 1 DROP(S): at 21:32

## 2023-01-05 RX ADMIN — Medication 650 MILLIGRAM(S): at 21:57

## 2023-01-05 NOTE — PHYSICAL THERAPY INITIAL EVALUATION ADULT - LEVEL OF INDEPENDENCE: SIT/STAND, REHAB EVAL
Pt presenting with significant LE weakness.  Difficulty standing up.  Pt UE intact which provided most support (with RW) with sit to stand./contact guard
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
## 2023-01-05 NOTE — PHYSICAL THERAPY INITIAL EVALUATION ADULT - GAIT PATTERN USED, PT EVAL
B/l LE weakness.  L leg presenting with noticeable increased weakness when compared to the R.  Pt ambulating with step to pattern.  Great effort./3-point gait

## 2023-01-05 NOTE — PHYSICAL THERAPY INITIAL EVALUATION ADULT - PERTINENT HX OF CURRENT PROBLEM, REHAB EVAL
32M with congenital HIV with myelopathy, GBS, presents with LE weakness. Pt reports that his weakness has been fluctuating but currently feels at its worst. LE tingling has resolved and sensation is intact. Denies saddle anesthesia, bowel/bladder incontinence. Pt has been admitted multiple times for similar complaint

## 2023-01-05 NOTE — PHYSICAL THERAPY INITIAL EVALUATION ADULT - NSPTDISCHREC_GEN_A_CORE
At this time pt would beenfit from Inpatient rehabilitative services in order to improve ambulation, LE strength, and functional limitations At this time pt would benefit from Inpatient rehabilitative services in order to improve ambulation, LE strength, and functional limitations

## 2023-01-05 NOTE — PHYSICAL THERAPY INITIAL EVALUATION ADULT - LEVEL OF INDEPENDENCE: STAND/SIT, REHAB EVAL
Pt presenting with pain when sitting down.  Unable to eccentrically control LE with stand to sit./contact guard

## 2023-01-06 LAB
ANION GAP SERPL CALC-SCNC: 14 MMOL/L — SIGNIFICANT CHANGE UP (ref 7–14)
BUN SERPL-MCNC: 10 MG/DL — SIGNIFICANT CHANGE UP (ref 7–23)
CALCIUM SERPL-MCNC: 8.9 MG/DL — SIGNIFICANT CHANGE UP (ref 8.4–10.5)
CHLORIDE SERPL-SCNC: 104 MMOL/L — SIGNIFICANT CHANGE UP (ref 98–107)
CO2 SERPL-SCNC: 20 MMOL/L — LOW (ref 22–31)
CREAT SERPL-MCNC: 0.75 MG/DL — SIGNIFICANT CHANGE UP (ref 0.5–1.3)
EGFR: 122 ML/MIN/1.73M2 — SIGNIFICANT CHANGE UP
GLUCOSE SERPL-MCNC: 83 MG/DL — SIGNIFICANT CHANGE UP (ref 70–99)
HCT VFR BLD CALC: 37.9 % — LOW (ref 39–50)
HGB BLD-MCNC: 12 G/DL — LOW (ref 13–17)
HIV-1 VIRAL LOAD RESULT: ABNORMAL
HIV1 RNA # SERPL NAA+PROBE: 889 — SIGNIFICANT CHANGE UP
HIV1 RNA SER-IMP: SIGNIFICANT CHANGE UP
HIV1 RNA SERPL NAA+PROBE-ACNC: ABNORMAL
HIV1 RNA SERPL NAA+PROBE-LOG#: 2.95 — SIGNIFICANT CHANGE UP
MAGNESIUM SERPL-MCNC: 1.5 MG/DL — LOW (ref 1.6–2.6)
MCHC RBC-ENTMCNC: 28.7 PG — SIGNIFICANT CHANGE UP (ref 27–34)
MCHC RBC-ENTMCNC: 31.7 GM/DL — LOW (ref 32–36)
MCV RBC AUTO: 90.7 FL — SIGNIFICANT CHANGE UP (ref 80–100)
NRBC # BLD: 0 /100 WBCS — SIGNIFICANT CHANGE UP (ref 0–0)
NRBC # FLD: 0 K/UL — SIGNIFICANT CHANGE UP (ref 0–0)
PHOSPHATE SERPL-MCNC: 4.2 MG/DL — SIGNIFICANT CHANGE UP (ref 2.5–4.5)
PLATELET # BLD AUTO: 241 K/UL — SIGNIFICANT CHANGE UP (ref 150–400)
POTASSIUM SERPL-MCNC: 4.2 MMOL/L — SIGNIFICANT CHANGE UP (ref 3.5–5.3)
POTASSIUM SERPL-SCNC: 4.2 MMOL/L — SIGNIFICANT CHANGE UP (ref 3.5–5.3)
RBC # BLD: 4.18 M/UL — LOW (ref 4.2–5.8)
RBC # FLD: 13 % — SIGNIFICANT CHANGE UP (ref 10.3–14.5)
SODIUM SERPL-SCNC: 138 MMOL/L — SIGNIFICANT CHANGE UP (ref 135–145)
WBC # BLD: 3.81 K/UL — SIGNIFICANT CHANGE UP (ref 3.8–10.5)
WBC # FLD AUTO: 3.81 K/UL — SIGNIFICANT CHANGE UP (ref 3.8–10.5)

## 2023-01-06 PROCEDURE — 99232 SBSQ HOSP IP/OBS MODERATE 35: CPT

## 2023-01-06 RX ADMIN — Medication 1 TABLET(S): at 18:10

## 2023-01-06 RX ADMIN — Medication 5 MILLIGRAM(S): at 01:07

## 2023-01-06 RX ADMIN — Medication 1 TABLET(S): at 16:55

## 2023-01-06 RX ADMIN — Medication 50 MILLIGRAM(S): at 21:11

## 2023-01-06 RX ADMIN — Medication 5 MILLIGRAM(S): at 21:12

## 2023-01-06 RX ADMIN — QUETIAPINE FUMARATE 100 MILLIGRAM(S): 200 TABLET, FILM COATED ORAL at 12:45

## 2023-01-06 RX ADMIN — Medication 50 MILLIGRAM(S): at 05:22

## 2023-01-06 RX ADMIN — Medication 3 MILLIGRAM(S): at 21:24

## 2023-01-06 RX ADMIN — Medication 1 TABLET(S): at 05:25

## 2023-01-06 RX ADMIN — Medication 50 MILLIGRAM(S): at 14:57

## 2023-01-06 RX ADMIN — Medication 1 DROP(S): at 05:23

## 2023-01-06 RX ADMIN — QUETIAPINE FUMARATE 300 MILLIGRAM(S): 200 TABLET, FILM COATED ORAL at 21:11

## 2023-01-06 RX ADMIN — BICTEGRAVIR SODIUM, EMTRICITABINE, AND TENOFOVIR ALAFENAMIDE FUMARATE 1 TABLET(S): 30; 120; 15 TABLET ORAL at 16:55

## 2023-01-07 ENCOUNTER — TRANSCRIPTION ENCOUNTER (OUTPATIENT)
Age: 33
End: 2023-01-07

## 2023-01-07 VITALS
RESPIRATION RATE: 18 BRPM | OXYGEN SATURATION: 100 % | DIASTOLIC BLOOD PRESSURE: 72 MMHG | TEMPERATURE: 98 F | SYSTOLIC BLOOD PRESSURE: 95 MMHG | HEART RATE: 60 BPM

## 2023-01-07 LAB
ANION GAP SERPL CALC-SCNC: 10 MMOL/L — SIGNIFICANT CHANGE UP (ref 7–14)
BUN SERPL-MCNC: 10 MG/DL — SIGNIFICANT CHANGE UP (ref 7–23)
CALCIUM SERPL-MCNC: 9.4 MG/DL — SIGNIFICANT CHANGE UP (ref 8.4–10.5)
CHLORIDE SERPL-SCNC: 103 MMOL/L — SIGNIFICANT CHANGE UP (ref 98–107)
CO2 SERPL-SCNC: 24 MMOL/L — SIGNIFICANT CHANGE UP (ref 22–31)
CREAT SERPL-MCNC: 0.86 MG/DL — SIGNIFICANT CHANGE UP (ref 0.5–1.3)
EGFR: 117 ML/MIN/1.73M2 — SIGNIFICANT CHANGE UP
GLUCOSE SERPL-MCNC: 85 MG/DL — SIGNIFICANT CHANGE UP (ref 70–99)
HCT VFR BLD CALC: 40.6 % — SIGNIFICANT CHANGE UP (ref 39–50)
HGB BLD-MCNC: 13.2 G/DL — SIGNIFICANT CHANGE UP (ref 13–17)
MAGNESIUM SERPL-MCNC: 1.4 MG/DL — LOW (ref 1.6–2.6)
MCHC RBC-ENTMCNC: 29.1 PG — SIGNIFICANT CHANGE UP (ref 27–34)
MCHC RBC-ENTMCNC: 32.5 GM/DL — SIGNIFICANT CHANGE UP (ref 32–36)
MCV RBC AUTO: 89.6 FL — SIGNIFICANT CHANGE UP (ref 80–100)
NRBC # BLD: 0 /100 WBCS — SIGNIFICANT CHANGE UP (ref 0–0)
NRBC # FLD: 0 K/UL — SIGNIFICANT CHANGE UP (ref 0–0)
PHOSPHATE SERPL-MCNC: 4.1 MG/DL — SIGNIFICANT CHANGE UP (ref 2.5–4.5)
PLATELET # BLD AUTO: 259 K/UL — SIGNIFICANT CHANGE UP (ref 150–400)
POTASSIUM SERPL-MCNC: 4.1 MMOL/L — SIGNIFICANT CHANGE UP (ref 3.5–5.3)
POTASSIUM SERPL-SCNC: 4.1 MMOL/L — SIGNIFICANT CHANGE UP (ref 3.5–5.3)
RBC # BLD: 4.53 M/UL — SIGNIFICANT CHANGE UP (ref 4.2–5.8)
RBC # FLD: 12.8 % — SIGNIFICANT CHANGE UP (ref 10.3–14.5)
SODIUM SERPL-SCNC: 137 MMOL/L — SIGNIFICANT CHANGE UP (ref 135–145)
WBC # BLD: 3.99 K/UL — SIGNIFICANT CHANGE UP (ref 3.8–10.5)
WBC # FLD AUTO: 3.99 K/UL — SIGNIFICANT CHANGE UP (ref 3.8–10.5)

## 2023-01-07 PROCEDURE — 99232 SBSQ HOSP IP/OBS MODERATE 35: CPT

## 2023-01-07 RX ORDER — ACETAMINOPHEN 500 MG
2 TABLET ORAL
Qty: 0 | Refills: 0 | DISCHARGE
Start: 2023-01-07

## 2023-01-07 RX ADMIN — Medication 1 TABLET(S): at 05:20

## 2023-01-07 RX ADMIN — Medication 50 MILLIGRAM(S): at 05:20

## 2023-01-07 NOTE — DISCHARGE NOTE PROVIDER - NSDCFUADDAPPT_GEN_ALL_CORE_FT
Please follow up with your primary care provider in 1-2 weeks following discharge from the hospital for continued monitoring and management. If you do not have a primary care provider please refer to the information for the internal medicine clinic to establish yourself as a patient.

## 2023-01-07 NOTE — CHART NOTE - NSCHARTNOTEFT_GEN_A_CORE
Informed by ANDREA Childs patient left against medical advice without informing staff. Patient was A&O x4 and has full capacity to make his own medical decisions. Pt left before provider had the opportunity to inform patient on risks of refusing treatment and the seriousness of leaving against medical advice such as the risks of heart attack, stroke, seizure, and even death were fully explained to the patient. IV catheter found by RN in patient's room. Attending, Dr. White, made aware.    Kelvin Merrill PA-C  Department of Medicine  Pager #[phone]

## 2023-01-07 NOTE — DISCHARGE NOTE PROVIDER - NSDCMRMEDTOKEN_GEN_ALL_CORE_FT
acetaminophen 325 mg oral tablet: 2 tab(s) orally every 6 hours, As needed, Temp greater or equal to 38C (100.4F), Mild Pain (1 - 3)  amoxicillin-clavulanate 875 mg-125 mg oral tablet: 1 tab(s) orally 2 times a day  baclofen 5 mg oral tablet: 1 tab(s) orally every 8 hours, As needed, Muscle Spasm or pain  bictegravir/emtricitabine/tenofovir 50 mg-200 mg-25 mg oral tablet: 1 tab(s) orally once a day  melatonin 3 mg oral tablet: 1 tab(s) orally once a day (at bedtime), As needed, Insomnia  pregabalin 50 mg oral capsule: 1 cap(s) orally 3 times a day  QUEtiapine 100 mg oral tablet: 1 tab(s) orally once a day  QUEtiapine 300 mg oral tablet: 1 tab(s) orally once a day (at bedtime)  sulfamethoxazole-trimethoprim 800 mg-160 mg oral tablet: 1 tab(s) orally 3 times a week (Mondays, Wednesdays, Fridays) to protect against opportunistic infections (PCP Pneumonia)

## 2023-01-07 NOTE — DISCHARGE NOTE PROVIDER - NSFOLLOWUPCLINICS_GEN_ALL_ED_FT
[facility] General Internal Medicine  General Internal Medicine  [address] [place] [address]  Phone: [phone]  Fax:

## 2023-01-07 NOTE — DISCHARGE NOTE PROVIDER - NSDCCPCAREPLAN_GEN_ALL_CORE_FT
PRINCIPAL DISCHARGE DIAGNOSIS  Diagnosis: Complaints of leg weakness  Assessment and Plan of Treatment: patient eloped before BRAYDEN placement complete      SECONDARY DISCHARGE DIAGNOSES  Diagnosis: HIV-associated myelopathy  Assessment and Plan of Treatment: Please continue your current medication regimen and follow up with your primary care provider for continued monitoring and care.    Diagnosis: HIV disease  Assessment and Plan of Treatment: Please continue your current medication regimen and follow up with your primary care provider for continued monitoring and care.    Diagnosis: Fracture of orbital floor  Assessment and Plan of Treatment: Please continue your current medication regimen and follow up with your primary care provider for continued monitoring and care.

## 2023-01-07 NOTE — DISCHARGE NOTE PROVIDER - HOSPITAL COURSE
32M with congenital HIV with myelopathy, GBS, presents with chronic LE weakness and acute left orbital fracture    HIV-associated myelopathy.   - Pt with LE weakness, reports worse than baseline. No evidence of saddle anesthesia or sensory deficit to suggest cord compression. Multiple prior MRI without evidence of compression or abnormal cord enhancement  - reports associated muscle spasms, chronic in nature  - continue lyrica and baclofen  - PT consulted - recommend BRAYDEN; pending ongoing evaluation and placement.    HIV disease.   - Pt with CD4 count of 145 during last admission  - continue biktarvy, bactrim for PCP ppx.     Fracture of orbital floor.   - Pt with left orbital floor fracture, no evidence of entrapment, also with nasal bone fracture  - appreciate ophtho and OMFS recs  - augmentin BID  - cold compresses  - no nose blowing  - no acute surgical intervention.    Need for prophylactic measure.   - DVT ppx: lovenox  - Diet: regular  - Dispo: PT rec BRAYDEN - pending placement.    Informed by ANDREA Childs patient left against medical advice without informing staff. Patient was A&O x4 and has full capacity to make his own medical decisions. Pt left before provider had the opportunity to inform patient on risks of refusing treatment and the seriousness of leaving against medical advice such as the risks of heart attack, stroke, seizure, and even death were fully explained to the patient. IV catheter found by RN in patient's room. Attending, Dr. White, made aware.

## 2023-01-07 NOTE — PROGRESS NOTE ADULT - PROBLEM SELECTOR PLAN 4
DVT ppx: lovenox  Diet: regular  Dispo: PT consulted
DVT ppx: lovenox  Diet: regular  Dispo: PT rec BRAYDEN
DVT ppx: lovenox  Diet: regular  Dispo: PT rec BRAYDEN - pending placement

## 2023-01-07 NOTE — PROGRESS NOTE ADULT - PROBLEM SELECTOR PLAN 2
Pt with CD4 count of 145 during last admission  - continue biktarvy, bactrim for PCP ppx
Pt with CD4 count of 145 during last admission  - continue biktarvy, bactrim for PCP ppx
Pt with CD4 count of 145 during last admission  - continue biktarvy, bactrim for PCP ppx  - check VL and CD4 count

## 2023-01-07 NOTE — PROGRESS NOTE ADULT - PROBLEM SELECTOR PLAN 1
Pt with LE weakness, reports worse than baseline. No evidence of saddle anesthesia or sensory deficit to suggest cord compression. Multiple prior MRI without evidence of compression or abnormal cord enhancement  - reports associated muscle spasms, chronic in nature  - continue lyrica and baclofen  - PT consult for eventual rehab placement
Pt with LE weakness, reports worse than baseline. No evidence of saddle anesthesia or sensory deficit to suggest cord compression. Multiple prior MRI without evidence of compression or abnormal cord enhancement  - reports associated muscle spasms, chronic in nature  - continue lyrica and baclofen  - PT consulted - recommend [facility]RAYDEN; pending ongoing evaluation
Pt with LE weakness, reports worse than baseline. No evidence of saddle anesthesia or sensory deficit to suggest cord compression. Multiple prior MRI without evidence of compression or abnormal cord enhancement  - reports associated muscle spasms, chronic in nature  - continue lyrica and baclofen  - PT consulted - recommend BRAYDEN; pending ongoing evaluation and placement

## 2023-01-07 NOTE — PROGRESS NOTE ADULT - SUBJECTIVE AND OBJECTIVE BOX
[facility] Division of Hospital Medicine  Negrito White DO  Available via MS Teams  In house pager [phone]    SUBJECTIVE / OVERNIGHT EVENTS:  No acute events overnight.  Denies acute complaints.  Says cramping is improved.  Has been able to ambulate to the bathroom.    ADDITIONAL REVIEW OF SYSTEMS:    MEDICATIONS  (STANDING):  amoxicillin  875 milliGRAM(s)/clavulanate 1 Tablet(s) Oral two times a day  artificial  tears Solution 1 Drop(s) Both EYES three times a day  bictegravir 50 mG/emtricitabine 200 mG/tenofovir alafenamide 25 mG (BIKTARVY) 1 Tablet(s) Oral daily  enoxaparin Injectable 40 milliGRAM(s) SubCutaneous every 24 hours  influenza   Vaccine 0.5 milliLiter(s) IntraMuscular once  pregabalin 50 milliGRAM(s) Oral three times a day  QUEtiapine 100 milliGRAM(s) Oral daily  QUEtiapine 300 milliGRAM(s) Oral at bedtime  trimethoprim  160 mG/sulfamethoxazole 800 mG 1 Tablet(s) Oral <User Schedule>    MEDICATIONS  (PRN):  acetaminophen     Tablet .. 650 milliGRAM(s) Oral every 6 hours PRN Temp greater or equal to 38C (100.4F), Mild Pain (1 - 3)  baclofen 5 milliGRAM(s) Oral every 8 hours PRN Muscle Spasm  melatonin 3 milliGRAM(s) Oral at bedtime PRN Insomnia      I&O's Summary      PHYSICAL EXAM:  Vital Signs Last 24 Hrs  T(C): 36.7 (07 Jan 2023 05:15), Max: 36.7 (07 Jan 2023 05:15)  T(F): 98.1 (07 Jan 2023 05:15), Max: 98.1 (07 Jan 2023 05:15)  HR: 60 (07 Jan 2023 05:15) (60 - 75)  BP: 95/72 (07 Jan 2023 05:15) (95/72 - 122/58)  BP(mean): --  RR: 18 (07 Jan 2023 05:15) (18 - 20)  SpO2: 100% (07 Jan 2023 05:15) (99% - 100%)    Parameters below as of 07 Jan 2023 05:15  Patient On (Oxygen Delivery Method): room air      CONSTITUTIONAL: NAD, well-developed, well-groomed  EYES: PERRLA; conjunctiva and sclera clear  ENMT: Moist oral mucosa, no pharyngeal injection or exudates; normal dentition  NECK: Supple, no palpable masses; no thyromegaly  RESPIRATORY: Normal respiratory effort; lungs are clear to auscultation bilaterally  CARDIOVASCULAR: Regular rate and rhythm, normal S1 and S2, no murmur/rub/gallop; No lower extremity edema; Peripheral pulses are 2+ bilaterally  ABDOMEN: Nontender to palpation, normoactive bowel sounds, no rebound/guarding; No hepatosplenomegaly  MUSCULOSKELETAL:  Normal gait; no clubbing or cyanosis of digits; no joint swelling or tenderness to palpation  PSYCH: A+O to person, place, and time; affect appropriate  NEUROLOGY: CN 2-12 are intact and symmetric; no gross sensory deficits   SKIN: No rashes; no palpable lesions    LABS:                        13.2   3.99  )-----------( 259      ( 07 Jan 2023 04:50 )             40.6     01-07    137  |  103  |  10  ----------------------------<  85  4.1   |  24  |  0.86    Ca    9.4      07 Jan 2023 04:50  Phos  4.1     01-07  Mg     1.40     01-07                COVID-19 PCR: NotDetec (15 Dec 2022 18:35)  COVID-19 PCR: NotDetec (12 Dec 2022 06:33)  SARS-CoV-2: NotDetec (07 Dec 2022 08:35)  SARS-CoV-2: NotDetec (10 Nov 2022 09:30)  COVID-19 PCR: NotDetec (18 Sep 2022 22:39)  COVID-19 PCR: NotDetec (16 Sep 2022 06:48)  SARS-CoV-2: NotDetec (11 Sep 2022 23:31)  SARS-CoV-2: NotDetec (31 Aug 2022 05:46)  COVID-19 PCR: NotDetec (15 Aug 2022 09:10)  COVID-19 PCR: NotDetec (12 Aug 2022 18:30)  SARS-CoV-2: NotDetec (09 Aug 2022 04:13)    
[facility] Division of Hospital Medicine  Negrito WhiteDO  Available via MS Teams  In house pager [phone]    SUBJECTIVE / OVERNIGHT EVENTS:  No acute events overnight.  Patient reports LLE cramping pain, which is usually relieved with his home gabapentin, lyrica, and baclofen.  Says he is willing to try PT this hospitalization.    ADDITIONAL REVIEW OF SYSTEMS:    MEDICATIONS  (STANDING):  amoxicillin  875 milliGRAM(s)/clavulanate 1 Tablet(s) Oral two times a day  artificial  tears Solution 1 Drop(s) Both EYES three times a day  bictegravir 50 mG/emtricitabine 200 mG/tenofovir alafenamide 25 mG (BIKTARVY) 1 Tablet(s) Oral daily  enoxaparin Injectable 40 milliGRAM(s) SubCutaneous every 24 hours  influenza   Vaccine 0.5 milliLiter(s) IntraMuscular once  pregabalin 50 milliGRAM(s) Oral three times a day  QUEtiapine 100 milliGRAM(s) Oral daily  QUEtiapine 300 milliGRAM(s) Oral at bedtime  trimethoprim  160 mG/sulfamethoxazole 800 mG 1 Tablet(s) Oral <User Schedule>    MEDICATIONS  (PRN):  acetaminophen     Tablet .. 650 milliGRAM(s) Oral every 6 hours PRN Temp greater or equal to 38C (100.4F), Mild Pain (1 - 3)  baclofen 5 milliGRAM(s) Oral every 8 hours PRN Muscle Spasm  melatonin 3 milliGRAM(s) Oral at bedtime PRN Insomnia      I&O's Summary      PHYSICAL EXAM:  Vital Signs Last 24 Hrs  T(C): 36.4 (05 Jan 2023 13:02), Max: 36.7 (05 Jan 2023 05:16)  T(F): 97.5 (05 Jan 2023 13:02), Max: 98.1 (05 Jan 2023 05:16)  HR: 72 (05 Jan 2023 13:02) (69 - 80)  BP: 127/79 (05 Jan 2023 13:02) (120/79 - 127/79)  BP(mean): --  RR: 18 (05 Jan 2023 13:02) (17 - 18)  SpO2: 100% (05 Jan 2023 13:02) (99% - 100%)    Parameters below as of 05 Jan 2023 13:02  Patient On (Oxygen Delivery Method): room air      CONSTITUTIONAL: NAD, well-developed, well-groomed  EYES: PERRLA; conjunctiva and sclera clear  ENMT: Moist oral mucosa, no pharyngeal injection or exudates; normal dentition  NECK: Supple, no palpable masses; no thyromegaly  RESPIRATORY: Normal respiratory effort; lungs are clear to auscultation bilaterally  CARDIOVASCULAR: Regular rate and rhythm, normal S1 and S2, no murmur/rub/gallop; No lower extremity edema; Peripheral pulses are 2+ bilaterally  ABDOMEN: Nontender to palpation, normoactive bowel sounds, no rebound/guarding; No hepatosplenomegaly  MUSCULOSKELETAL:  Normal gait; no clubbing or cyanosis of digits; no joint swelling or tenderness to palpation  PSYCH: A+O to person, place, and time; affect appropriate  NEUROLOGY: CN 2-12 are intact and symmetric; no gross sensory deficits   SKIN: No rashes; no palpable lesions    LABS:                        12.4   3.76  )-----------( 225      ( 05 Jan 2023 05:30 )             38.8     01-05    135  |  102  |  14  ----------------------------<  111<H>  3.5   |  23  |  0.81    Ca    8.4      05 Jan 2023 05:30  Phos  4.2     01-05  Mg     1.70     01-05    TPro  8.3  /  Alb  4.5  /  TBili  0.9  /  DBili  x   /  AST  29  /  ALT  17  /  AlkPhos  64  01-04              COVID-19 PCR: NotDetec (15 Dec 2022 18:35)  COVID-19 PCR: NotDetec (12 Dec 2022 06:33)  SARS-CoV-2: NotDetec (07 Dec 2022 08:35)  SARS-CoV-2: NotDetec (10 Nov 2022 09:30)  COVID-19 PCR: NotDetec (18 Sep 2022 22:39)  COVID-19 PCR: NotDetec (16 Sep 2022 06:48)  SARS-CoV-2: NotDetec (11 Sep 2022 23:31)  SARS-CoV-2: NotDetec (31 Aug 2022 05:46)  COVID-19 PCR: NotDetec (15 Aug 2022 09:10)  COVID-19 PCR: NotDetec (12 Aug 2022 18:30)  SARS-CoV-2: [facility] (09 Aug 2022 04:13)    
[facility] Division of Hospital Medicine  Negrito White DO  Available via MS Teams  In house pager [phone]    SUBJECTIVE / OVERNIGHT EVENTS:  No acute events overnight.  Denies acute complaints.  States his spasms are improved today.  Still has some pain in the left eye.    ADDITIONAL REVIEW OF SYSTEMS:    MEDICATIONS  (STANDING):  amoxicillin  875 milliGRAM(s)/clavulanate 1 Tablet(s) Oral two times a day  artificial  tears Solution 1 Drop(s) Both EYES three times a day  bictegravir 50 mG/emtricitabine 200 mG/tenofovir alafenamide 25 mG (BIKTARVY) 1 Tablet(s) Oral daily  enoxaparin Injectable 40 milliGRAM(s) SubCutaneous every 24 hours  influenza   Vaccine 0.5 milliLiter(s) IntraMuscular once  pregabalin 50 milliGRAM(s) Oral three times a day  QUEtiapine 100 milliGRAM(s) Oral daily  QUEtiapine 300 milliGRAM(s) Oral at bedtime  trimethoprim  160 mG/sulfamethoxazole 800 mG 1 Tablet(s) Oral <User Schedule>    MEDICATIONS  (PRN):  acetaminophen     Tablet .. 650 milliGRAM(s) Oral every 6 hours PRN Temp greater or equal to 38C (100.4F), Mild Pain (1 - 3)  baclofen 5 milliGRAM(s) Oral every 8 hours PRN Muscle Spasm  melatonin 3 milliGRAM(s) Oral at bedtime PRN Insomnia      I&O's Summary      PHYSICAL EXAM:  Vital Signs Last 24 Hrs  T(C): 36.7 (06 Jan 2023 05:20), Max: 36.7 (06 Jan 2023 05:20)  T(F): 98.1 (06 Jan 2023 05:20), Max: 98.1 (06 Jan 2023 05:20)  HR: 84 (06 Jan 2023 05:20) (78 - 84)  BP: 98/50 (06 Jan 2023 05:20) (98/50 - 116/72)  BP(mean): --  RR: 18 (06 Jan 2023 05:20) (18 - 18)  SpO2: 98% (06 Jan 2023 05:20) (98% - 100%)    Parameters below as of 06 Jan 2023 05:20  Patient On (Oxygen Delivery Method): room air      CONSTITUTIONAL: NAD, well-developed, well-groomed  EYES: PERRLA; conjunctiva and sclera clear  ENMT: Moist oral mucosa, no pharyngeal injection or exudates; normal dentition  NECK: Supple, no palpable masses; no thyromegaly  RESPIRATORY: Normal respiratory effort; lungs are clear to auscultation bilaterally  CARDIOVASCULAR: Regular rate and rhythm, normal S1 and S2, no murmur/rub/gallop; No lower extremity edema; Peripheral pulses are 2+ bilaterally  ABDOMEN: Nontender to palpation, normoactive bowel sounds, no rebound/guarding; No hepatosplenomegaly  MUSCULOSKELETAL:  Normal gait; no clubbing or cyanosis of digits; no joint swelling or tenderness to palpation  PSYCH: A+O to person, place, and time; affect appropriate  NEUROLOGY: CN 2-12 are intact and symmetric; no gross sensory deficits   SKIN: No rashes; no palpable lesions    LABS:                        12.0   3.81  )-----------( 241      ( 06 Jan 2023 06:40 )             37.9     01-06    138  |  104  |  10  ----------------------------<  83  4.2   |  20<L>  |  0.75    Ca    8.9      06 Jan 2023 06:40  Phos  4.2     01-06  Mg     1.50     01-06                COVID-19 PCR: NotDetec (15 Dec 2022 18:35)  COVID-19 PCR: NotDetec (12 Dec 2022 06:33)  SARS-CoV-2: NotDetec (07 Dec 2022 08:35)  SARS-CoV-2: NotDetec (10 Nov 2022 09:30)  COVID-19 PCR: NotDetec (18 Sep 2022 22:39)  COVID-19 PCR: NotDetec (16 Sep 2022 06:48)  SARS-CoV-2: NotDetec (11 Sep 2022 23:31)  SARS-CoV-2: NotDetec (31 Aug 2022 05:46)  COVID-19 PCR: NotDetec (15 Aug 2022 09:10)  COVID-19 PCR: NotDetec (12 Aug 2022 18:30)  SARS-CoV-2: NotDetec (09 Aug 2022 04:13)

## 2023-01-16 NOTE — ED PROVIDER NOTE - CARDIAC, MLM
Detail Level: Generalized Detail Level: Simple Detail Level: Detailed Normal rate, regular rhythm.  Heart sounds S1, S2.  No murmurs, rubs or gallops.

## 2023-01-17 NOTE — PROGRESS NOTE ADULT - ATTENDING SUPERVISION STATEMENT
Resident
26-Apr-2022 16:15:06
Resident

## 2023-01-17 NOTE — PROGRESS NOTE ADULT - LYMPH NODES
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 Apply topically to the affected area twice daily   • doxycycline (VIBRAMYCIN) 100 MG capsule TAKE 1 CAPSULE BY MOUTH TWICE DAILY WITH A FULL GLASS OF WATER. DO NOT LIE DOWN UNTIL 1 HOUR AFTER TAKING   • HYDROcodone-acetaminophen (NORCO) 5-325 MG per tablet Take 1 tablet by mouth Every 6 (Six) Hours As Needed for Severe Pain. 1-2 oral Q4H PRN severe pain   • HYDROcodone-acetaminophen (NORCO) 5-325 MG per tablet Take 1 tablet by mouth Every 4 (Four) Hours As Needed for Severe Pain.   • traMADol (ULTRAM) 50 MG tablet Take 1 tablet by mouth Every 6 (Six) Hours As Needed for Severe Pain.     No current facility-administered medications on file prior to visit.     Current Medications:  Scheduled Meds:  Continuous Infusions:No current facility-administered medications for this visit.    PRN Meds:.    I have reviewed the patient's medical history in detail and updated the computerized patient record.  Review and summarization of old records include:    Past Medical History:   Diagnosis Date   • Hx of knee surgery 2012    osteochondritis, took bone graft from hip and put into the knees        Past Surgical History:   Procedure Laterality Date   • KNEE SURGERY Bilateral 2006        Social  History     Occupational History     Employer: [email or web address]   Tobacco Use   • Smoking status: Never     Passive exposure: Never   • Smokeless tobacco: Never   Vaping Use   • Vaping Use: Never used   Substance and Sexual Activity   • Alcohol use: Yes     Comment: social   • Drug use: Never   • Sexual activity: Defer      Social History     Social History Narrative   • Not on file      No family history on file.    ROS: 14 point review of systems was performed and was negative except for documented findings in HPI and today's encounter.     Allergies: No Known Allergies  Constitutional:  Denies fever, shaking or chills   Eyes:  Denies change in visual acuity   HENT:  Denies nasal congestion or sore throat   Respiratory:  Denies cough or shortness of breath   Cardiovascular:  Denies chest pain or severe LE edema   GI:  Denies abdominal pain, nausea, vomiting, bloody stools or diarrhea   Musculoskeletal:  Numbness, tingling, or loss of motor function only as noted above in history of present illness.  : Denies painful urination or hematuria  Integument:  Denies rash, lesion or ulceration   Neurologic:  Denies headache or focal weakness  Endocrine:  Denies lymphadenopathy  Psych:  Denies confusion or change in mental status   Hem:  Denies active bleeding      Physical Exam: 24 y.o. male  Wt Readings from Last 3 Encounters:   12/22/22 84 kg (185 lb 1.6 oz)   11/28/22 81.6 kg (180 lb)   11/10/22 83.9 kg (185 lb)       There is no height or weight on file to calculate BMI.    There were no vitals filed for this visit.  Vital signs reviewed.   General Appearance:    Alert, cooperative, in no acute distress                    Ortho exam    Exam shows no effusion no redness full range of motion no palpable tenderness quads are actually quite good             Assessment: Right lateral tibial plateau fracture I did not repeat his x-rays his last x-rays look fine you could only see this fracture on MRI I think he is doing  well clinically subjectively he can continue progress his activities and I will see him back as needed he can return to work on the 23rd without restrictions    Plan: As above  Follow up as indicated.      Gaby Corley M.D.         No lymphadedenopathy

## 2023-02-03 NOTE — H&P ADULT - NSHPLABSRESULTS_GEN_ALL_CORE
11.2   5.79  )-----------( 350      ( 18 Nov 2022 20:58 )             34.3     11-18    139  |  107  |  12  ----------------------------<  108<H>  3.5   |  25  |  0.76    Ca    8.4<L>      18 Nov 2022 20:58  Mg     1.4     11-18    TPro  7.7  /  Alb  3.2<L>  /  TBili  0.3  /  DBili  x   /  AST  36  /  ALT  47  /  AlkPhos  58  11-18    SARS-CoV-2: NotDetec (10 Nov 2022 09:30)  COVID-19 PCR: NotDetec (18 Sep 2022 22:39)  COVID-19 PCR: NotDetec (16 Sep 2022 06:48)  SARS-CoV-2: NotDetec (11 Sep 2022 23:31)  SARS-CoV-2: NotDetec (31 Aug 2022 05:46)  COVID-19 PCR: NotDetec (15 Aug 2022 09:10)  COVID-19 PCR: NotDetec (12 Aug 2022 18:30)  SARS-CoV-2: NotDetec (09 Aug 2022 04:13)    CAPILLARY BLOOD GLUCOSE        Lactate, Blood: 1.2 mmol/L (11.19.22 @ 05:34)   Lactate, Blood: 3.6 mmol/L (11.19.22 @ 01:21) Flu With COVID-19 By MARY (11.18.22 @ 20:58)   SARS-CoV-2 Result: NotDetec: This Respiratory Panel uses polymerase chain reaction (PCR) to detect for   influenza A; influenza B; respiratory syncytial virus; and SARS-CoV-2.   This test was validated by [facility] and is in use under the FDA   Emergency Use Authorization (EUA) for clinical labs CLIA-certified to   perform high complexity testing. Test results should be correlated with   clinical presentation, patient history, and epidemiology.   Influenza A Result: NotDetec   Influenza B Result: NotDetec   Resp Syn Virus Result: NotDete Serum Pro-Brain Natriuretic Peptide: 29 pg/mL (11.18.22 @ 20:58) Magnesium, Serum: 1.4 mg/dL (11.18.22 @ 20:58) Lipase, Serum: 67 U/L (11.18.22 @ 20:58) Troponin I, High Sensitivity Result: 4.92:    RADIOLOGY:    < from: CT Abdomen and Pelvis w/ IV Cont (11.18.22 @ 22:42) >    IMPRESSION:  No evidence of acute traumatic injury to the chest, abdomen, or pelvis.    Scattered tree-in bud opacities in the right upper lobe and right lower   lobe likely reflective of a bronchiolitis.    < end of copied text >    < from: CT Chest w/ IV Cont (11.18.22 @ 22:40) >    IMPRESSION:  No evidence of acute traumatic injury to the chest, abdomen, or pelvis.    Scattered tree-in bud opacities in the right upper lobe and right lower   lobe likely reflective of a bronchiolitis.    < end of copied text >    < from: CT Cervical Spine No Cont (11.18.22 @ 22:38) >    IMPRESSION:    No acute cervical spine fracture or evidence of traumatic malalignment.   No significant cervical spondylosis. If clinical symptoms persist or   worsen, consider repeat MRI.    < end of copied text >    < from: CT Head No Cont (11.18.22 @ 20:36) >    IMPRESSION: No acute intracranial hemorrhage or mass effect.    < end of copied text >    I personally reviewed labs, imaging, orders and vitals. English

## 2023-02-27 NOTE — ED PROVIDER NOTE - CONSTITUTIONAL, MLM
normal... Well appearing, THIN BUT well nourished, awake, alert, oriented to person, place, time/situation and in no apparent distress. Cyclophosphamide Pregnancy And Lactation Text: This medication is Pregnancy Category D and it isn't considered safe during pregnancy. This medication is excreted in breast milk.

## 2023-03-27 ENCOUNTER — EMERGENCY (EMERGENCY)
Facility: HOSPITAL | Age: 33
LOS: 1 days | Discharge: HOME | End: 2023-03-27
Attending: EMERGENCY MEDICINE
Payer: MEDICAID

## 2023-03-27 DIAGNOSIS — Z20.822 CONTACT WITH AND (SUSPECTED) EXPOSURE TO COVID-19: ICD-10-CM

## 2023-03-27 DIAGNOSIS — J06.9 ACUTE UPPER RESPIRATORY INFECTION, UNSPECIFIED: ICD-10-CM

## 2023-03-27 DIAGNOSIS — R53.1 WEAKNESS: ICD-10-CM

## 2023-03-27 DIAGNOSIS — M54.9 DORSALGIA, UNSPECIFIED: ICD-10-CM

## 2023-03-27 DIAGNOSIS — Z98.890 OTHER SPECIFIED POSTPROCEDURAL STATES: Chronic | ICD-10-CM

## 2023-03-27 PROCEDURE — 85025 COMPLETE CBC W/AUTO DIFF WBC: CPT

## 2023-03-27 PROCEDURE — 0225U NFCT DS DNA&RNA 21 SARSCOV2: CPT

## 2023-03-27 PROCEDURE — 96360 HYDRATION IV INFUSION INIT: CPT

## 2023-03-27 PROCEDURE — 80053 COMPREHEN METABOLIC PANEL: CPT

## 2023-03-27 PROCEDURE — 99284 EMERGENCY DEPT VISIT MOD MDM: CPT

## 2023-03-27 PROCEDURE — 99283 EMERGENCY DEPT VISIT LOW MDM: CPT | Mod: 25

## 2023-03-27 PROCEDURE — 36415 COLL VENOUS BLD VENIPUNCTURE: CPT

## 2023-03-28 LAB
ALBUMIN SERPL ELPH-MCNC: 3.6 G/DL — SIGNIFICANT CHANGE UP (ref 3.3–5)
ALP SERPL-CCNC: 70 U/L — SIGNIFICANT CHANGE UP (ref 40–120)
ALT FLD-CCNC: 31 U/L — SIGNIFICANT CHANGE UP (ref 12–78)
ANION GAP SERPL CALC-SCNC: 7 MMOL/L — SIGNIFICANT CHANGE UP (ref 5–17)
AST SERPL-CCNC: 41 U/L — HIGH (ref 15–37)
BASOPHILS # BLD AUTO: 0 K/UL — SIGNIFICANT CHANGE UP (ref 0–0.2)
BASOPHILS NFR BLD AUTO: 0 % — SIGNIFICANT CHANGE UP (ref 0–2)
BILIRUB SERPL-MCNC: 1.3 MG/DL — HIGH (ref 0.2–1.2)
BUN SERPL-MCNC: 15 MG/DL — SIGNIFICANT CHANGE UP (ref 7–23)
CALCIUM SERPL-MCNC: 8.8 MG/DL — SIGNIFICANT CHANGE UP (ref 8.5–10.1)
CHLORIDE SERPL-SCNC: 104 MMOL/L — SIGNIFICANT CHANGE UP (ref 96–108)
CO2 SERPL-SCNC: 24 MMOL/L — SIGNIFICANT CHANGE UP (ref 22–31)
CREAT SERPL-MCNC: 0.77 MG/DL — SIGNIFICANT CHANGE UP (ref 0.5–1.3)
EGFR: 120 ML/MIN/1.73M2 — SIGNIFICANT CHANGE UP
EOSINOPHIL # BLD AUTO: 0.04 K/UL — SIGNIFICANT CHANGE UP (ref 0–0.5)
EOSINOPHIL NFR BLD AUTO: 1 % — SIGNIFICANT CHANGE UP (ref 0–6)
GLUCOSE SERPL-MCNC: 83 MG/DL — SIGNIFICANT CHANGE UP (ref 70–99)
HCT VFR BLD CALC: 37.9 % — LOW (ref 39–50)
HGB BLD-MCNC: 12.5 G/DL — LOW (ref 13–17)
LYMPHOCYTES # BLD AUTO: 1.71 K/UL — SIGNIFICANT CHANGE UP (ref 1–3.3)
LYMPHOCYTES # BLD AUTO: 48 % — HIGH (ref 13–44)
LYMPHOCYTES # SPEC AUTO: 3 % — HIGH (ref 0–0)
MANUAL SMEAR VERIFICATION: SIGNIFICANT CHANGE UP
MCHC RBC-ENTMCNC: 28.7 PG — SIGNIFICANT CHANGE UP (ref 27–34)
MCHC RBC-ENTMCNC: 33 GM/DL — SIGNIFICANT CHANGE UP (ref 32–36)
MCV RBC AUTO: 86.9 FL — SIGNIFICANT CHANGE UP (ref 80–100)
MONOCYTES # BLD AUTO: 0.32 K/UL — SIGNIFICANT CHANGE UP (ref 0–0.9)
MONOCYTES NFR BLD AUTO: 9 % — SIGNIFICANT CHANGE UP (ref 2–14)
NEUTROPHILS # BLD AUTO: 1.28 K/UL — LOW (ref 1.8–7.4)
NEUTROPHILS NFR BLD AUTO: 36 % — LOW (ref 43–77)
NRBC # BLD: 0 /100 — SIGNIFICANT CHANGE UP (ref 0–0)
NRBC # BLD: SIGNIFICANT CHANGE UP /100 WBCS (ref 0–0)
PLAT MORPH BLD: NORMAL — SIGNIFICANT CHANGE UP
PLATELET # BLD AUTO: 178 K/UL — SIGNIFICANT CHANGE UP (ref 150–400)
POTASSIUM SERPL-MCNC: 3.8 MMOL/L — SIGNIFICANT CHANGE UP (ref 3.5–5.3)
POTASSIUM SERPL-SCNC: 3.8 MMOL/L — SIGNIFICANT CHANGE UP (ref 3.5–5.3)
PROT SERPL-MCNC: 7.9 GM/DL — SIGNIFICANT CHANGE UP (ref 6–8.3)
RAPID RVP RESULT: SIGNIFICANT CHANGE UP
RBC # BLD: 4.36 M/UL — SIGNIFICANT CHANGE UP (ref 4.2–5.8)
RBC # FLD: 12.5 % — SIGNIFICANT CHANGE UP (ref 10.3–14.5)
RBC BLD AUTO: NORMAL — SIGNIFICANT CHANGE UP
SARS-COV-2 RNA SPEC QL NAA+PROBE: SIGNIFICANT CHANGE UP
SODIUM SERPL-SCNC: 135 MMOL/L — SIGNIFICANT CHANGE UP (ref 135–145)
VARIANT LYMPHS # BLD: 3 % — SIGNIFICANT CHANGE UP (ref 0–6)
WBC # BLD: 3.56 K/UL — LOW (ref 3.8–10.5)
WBC # FLD AUTO: 3.56 K/UL — LOW (ref 3.8–10.5)

## 2023-04-05 NOTE — ED ADULT NURSE NOTE - CAS DISCH BELONGINGS RETURNED
Shira Ulloa is a 23 year old female presenting to the walk-in clinic today for reports of possibly being pregnant. Patient states she has not had a regular period in a few months. Her stomach has been \"rounding\" out and pregnancy tests has been negative.    Swabs/Specimens collected during rooming process: none    BP greater than 140/90: No  Recheck Completed: NA    PPE worn during rooming process:  Writer: Level 3 Mask, Face Shield/Eye Protection, Gloves   Patient: Level 1 Mask     Is this the best contact number to reach you at? [phone]    Is it okay to leave a detailed voicemail? Yes     Not applicable

## 2023-05-02 ENCOUNTER — EMERGENCY (EMERGENCY)
Facility: HOSPITAL | Age: 33
LOS: 0 days | Discharge: HOME | End: 2023-05-02
Attending: EMERGENCY MEDICINE
Payer: MEDICAID

## 2023-05-02 VITALS
SYSTOLIC BLOOD PRESSURE: 126 MMHG | HEIGHT: 71 IN | OXYGEN SATURATION: 100 % | WEIGHT: 179.9 LBS | RESPIRATION RATE: 18 BRPM | DIASTOLIC BLOOD PRESSURE: 77 MMHG | HEART RATE: 72 BPM | TEMPERATURE: 98 F

## 2023-05-02 DIAGNOSIS — M25.562 PAIN IN LEFT KNEE: ICD-10-CM

## 2023-05-02 DIAGNOSIS — J45.909 UNSPECIFIED ASTHMA, UNCOMPLICATED: ICD-10-CM
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
## 2023-05-02 DIAGNOSIS — Z88.1 ALLERGY STATUS TO OTHER ANTIBIOTIC AGENTS STATUS: ICD-10-CM

## 2023-05-02 DIAGNOSIS — Z88.6 ALLERGY STATUS TO ANALGESIC AGENT: ICD-10-CM
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
## 2023-05-02 DIAGNOSIS — G61.0 GUILLAIN-BARRE SYNDROME: ICD-10-CM

## 2023-05-02 DIAGNOSIS — Z86.59 PERSONAL HISTORY OF OTHER MENTAL AND BEHAVIORAL DISORDERS: ICD-10-CM

## 2023-05-02 DIAGNOSIS — B20 HUMAN IMMUNODEFICIENCY VIRUS [HIV] DISEASE: ICD-10-CM

## 2023-05-02 DIAGNOSIS — Z98.890 OTHER SPECIFIED POSTPROCEDURAL STATES: Chronic | ICD-10-CM

## 2023-05-02 PROCEDURE — 99283 EMERGENCY DEPT VISIT LOW MDM: CPT | Mod: 25

## 2023-05-02 PROCEDURE — 99284 EMERGENCY DEPT VISIT MOD MDM: CPT

## 2023-05-02 PROCEDURE — 73562 X-RAY EXAM OF KNEE 3: CPT | Mod: 26,LT

## 2023-05-02 PROCEDURE — 73562 X-RAY EXAM OF KNEE 3: CPT | Mod: LT

## 2023-05-02 RX ORDER — BACLOFEN 100 %
5 POWDER (GRAM) MISCELLANEOUS ONCE
Refills: 0 | Status: COMPLETED | OUTPATIENT
Start: 2023-05-02 | End: 2023-05-02

## 2023-05-02 RX ORDER — LIDOCAINE 4 G/100G
1 CREAM TOPICAL ONCE
Refills: 0 | Status: COMPLETED | OUTPATIENT
Start: 2023-05-02 | End: 2023-05-02

## 2023-05-02 RX ADMIN — LIDOCAINE 1 PATCH: 4 CREAM TOPICAL at 04:16

## 2023-05-02 RX ADMIN — Medication 5 MILLIGRAM(S): at 04:16

## 2023-05-02 NOTE — ED ADULT NURSE NOTE - CHIEF COMPLAINT QUOTE
[place] patient states he has been feeling weakness to legs and back for past 3 days HX: guilliam barre symdrome, congenital HIV

## 2023-05-02 NOTE — ED PROVIDER NOTE - NSFOLLOWUPINSTRUCTIONS_ED_ALL_ED_FT
please follow up with clinic in 2-3 days.   take motrin and tylenol for pain.   may use ice or heating pads for comfort.   drink plenty of fluids.   return to ED for any concerns

## 2023-05-02 NOTE — ED PROVIDER NOTE - NSICDXPASTMEDICALHX_GEN_ALL_CORE_FT
PAST MEDICAL HISTORY:  Asthma     Chronic sinusitis     Closed fracture of multiple ribs of right side, initial encounter     Cocaine abuse     GBS (Guillain Vermilion syndrome)     HIV (human immunodeficiency virus infection) from birth    Homeless

## 2023-05-02 NOTE — ED PROVIDER NOTE - NSFOLLOWUPCLINICS_GEN_ALL_ED_FT
[facility]  Family Medicine  [address] [place] [address]  Phone: [phone]  Fax:

## 2023-05-02 NOTE — ED ADULT NURSE NOTE - OBJECTIVE STATEMENT
pt presents to the ED with L knee pain after he was unable [place] station. pt states he has a hx of HIV and [place] which often leaves him weak - increased weakness noted over the last few days. c/o some knee pain. full ROM of extremity noted however pt states he is unable to bear weight on said extremity. no further complaints or discomforts reported at this time. safety and comfort maintained

## 2023-05-02 NOTE — ED PROVIDER NOTE - CLINICAL SUMMARY MEDICAL DECISION MAKING FREE TEXT BOX
34 y/o [place] with multiple medical [place]/o [place] pain s/p trip and fall yesterday at the train station.   pt denies any LOC< HA, head trauma, neck pain, cp, sob, n/v/d/abd pain.   no meds taken for pain.   pt states h/o chronic pain.       PE: drowsy but awake.   no visible deformity.   FAROM.   NT.   will XR, med and reeval

## 2023-05-02 NOTE — ED PROVIDER NOTE - PATIENT PORTAL LINK FT
You can access the FollowMyHealth Patient Portal offered by [facility] by registering at the following website: [email or web address]. By joining [email or web address]’s FollowMyHealth portal, you will also be able to view your health information using other applications (apps) compatible with our system.

## 2023-05-02 NOTE — ED ADULT TRIAGE NOTE - CHIEF COMPLAINT QUOTE
[place] pain[facility] states he has been feeling weakness to legs and back for past 3 days HX: guilliam barre symdrome, congenital HIV

## 2023-05-02 NOTE — ED PROVIDER NOTE - OBJECTIVE STATEMENT
34 y/o male with multiple medical issues [place] c/o left knee pain s/p trip and fall yesterday at the train station.   pt denies any LOC< HA, head trauma, neck pain, cp, sob, n/v/d/abd pain.   no meds taken for pain.   pt states h/o chronic pain.

## 2023-05-02 NOTE — ED ADULT NURSE NOTE - NSIMPLEMENTINTERV_GEN_ALL_ED
Implemented All Fall Risk Interventions:  [place] to call system. Call bell, personal items and telephone within reach. Instruct patient to call for assistance. Room bathroom lighting operational. Non-slip footwear when patient is off stretcher. Physically safe environment: no spills, clutter or unnecessary equipment. Stretcher in lowest position, wheels locked, appropriate side rails in place. Provide visual cue, wrist band, yellow gown, etc. Monitor gait and stability. Monitor for mental status changes and reorient to person, place, and time. Review medications for side effects contributing to fall risk. Reinforce activity limits and safety measures with patient and family.

## 2023-05-03 NOTE — ED CDU PROVIDER INITIAL DAY NOTE - MUSCULOSKELETAL MINIMAL EXAM
[[id number]]
RANGE OF MOTION LIMITED/L knee; no effusion/ erythema/ warmth;  pt c/o pain with ranging knee

## 2023-05-17 ENCOUNTER — EMERGENCY (EMERGENCY)
Facility: HOSPITAL | Age: 33
LOS: 0 days | Discharge: HOME | End: 2023-05-17
Attending: HOSPITALIST
Payer: MEDICAID

## 2023-05-17 VITALS
RESPIRATION RATE: 18 BRPM | OXYGEN SATURATION: 99 % | DIASTOLIC BLOOD PRESSURE: 87 MMHG | SYSTOLIC BLOOD PRESSURE: 132 MMHG | HEIGHT: 71 IN | WEIGHT: 160.06 LBS | HEART RATE: 106 BPM | TEMPERATURE: 98 F

## 2023-05-17 DIAGNOSIS — Z88.6 ALLERGY STATUS TO ANALGESIC AGENT: ICD-10-CM

## 2023-05-17 DIAGNOSIS — Z21 ASYMPTOMATIC HUMAN IMMUNODEFICIENCY VIRUS [HIV] INFECTION STATUS: ICD-10-CM

## 2023-05-17 DIAGNOSIS — Z88.1 ALLERGY STATUS TO OTHER ANTIBIOTIC AGENTS STATUS: ICD-10-CM

## 2023-05-17 DIAGNOSIS — Z98.890 OTHER SPECIFIED POSTPROCEDURAL STATES: Chronic | ICD-10-CM

## 2023-05-17 DIAGNOSIS — J45.909 UNSPECIFIED ASTHMA, UNCOMPLICATED: ICD-10-CM

## 2023-05-17 DIAGNOSIS — M79.662 PAIN IN LEFT LOWER LEG: ICD-10-CM

## 2023-05-17 DIAGNOSIS — Z87.81 PERSONAL HISTORY OF (HEALED) TRAUMATIC FRACTURE: ICD-10-CM

## 2023-05-17 DIAGNOSIS — Z59.00 HOMELESSNESS UNSPECIFIED: ICD-10-CM

## 2023-05-17 PROCEDURE — 99283 EMERGENCY DEPT VISIT LOW MDM: CPT

## 2023-05-17 RX ORDER — OXYCODONE HYDROCHLORIDE 5 MG/1
1 TABLET ORAL
Qty: 9 | Refills: 0
Start: 2023-05-17 | End: 2023-05-19

## 2023-05-17 RX ORDER — BICTEGRAVIR SODIUM, EMTRICITABINE, AND TENOFOVIR ALAFENAMIDE FUMARATE 30; 120; 15 MG/1; MG/1; MG/1
1 TABLET ORAL
Qty: 14 | Refills: 0
Start: 2023-05-17 | End: 2023-05-30

## 2023-05-17 RX ORDER — ACETAMINOPHEN 500 MG
650 TABLET ORAL ONCE
Refills: 0 | Status: COMPLETED | OUTPATIENT
Start: 2023-05-17 | End: 2023-05-17

## 2023-05-17 RX ADMIN — Medication 650 MILLIGRAM(S): at 04:31

## 2023-05-17 SDOH — ECONOMIC STABILITY - HOUSING INSECURITY: HOMELESSNESS UNSPECIFIED: Z59.00

## 2023-05-17 NOTE — ED ADULT NURSE NOTE - PAIN: BODY LOCATION
S Plasty Text: Given the location and shape of the defect, and the orientation of relaxed skin tension lines, an S-plasty was deemed most appropriate for repair.  Using a sterile surgical marker, the appropriate outline of the S-plasty was drawn, incorporating the defect and placing the expected incisions within the relaxed skin tension lines where possible.  The area thus outlined was incised deep to adipose tissue with a #15 scalpel blade.  The skin margins were undermined to an appropriate distance in all directions utilizing iris scissors. The skin flaps were advanced over the defect.  The opposing margins were then approximated with interrupted buried subcutaneous sutures. leg/Bilateral:

## 2023-05-17 NOTE — ED PROVIDER NOTE - OBJECTIVE STATEMENT
33-year-old male presents with left leg pain.  Patient initially thought he was having  a GBS flare but thinks he was just tired from working all day.  States he would actually really left to go home and rest.  Offered pain medication, he requested only Tylenol.  He is well-appearing. Did state that he ran out of few of his home medications which will be sent to his Pharmacy.

## 2023-05-17 NOTE — ED PROVIDER NOTE - PATIENT PORTAL LINK FT
You can access the FollowMyHealth Patient Portal offered by [facility] by registering at the following website: [email or web address]. By joining [employer]’s FollowMyHealth portal, you will also be able to view your health information using other applications (apps) compatible with our system.

## 2023-05-17 NOTE — ED PROVIDER NOTE - NSICDXPASTMEDICALHX_GEN_ALL_CORE_FT
PAST MEDICAL HISTORY:  Asthma     Chronic sinusitis     Closed fracture of multiple ribs of right side, initial encounter     Cocaine abuse     GBS (Guillain [place] syndrome)     HIV (human immunodeficiency virus infection) from birth    Homeless

## 2023-05-17 NOTE — ED ADULT NURSE NOTE - NSFALLUNIVINTERV_ED_ALL_ED
Bed/Stretcher in lowest position, wheels locked, appropriate side rails in place/Call bell, personal items and telephone in reach/Instruct patient to call for assistance before getting out of bed/chair/stretcher/Non-slip footwear applied when patient is off stretcher/[place] to call system/Physically safe environment - no spills, clutter or unnecessary equipment/Purposeful proactive rounding/Room/bathroom lighting operational, light cord in reach

## 2023-05-17 NOTE — ED ADULT NURSE NOTE - OBJECTIVE STATEMENT
Pt is a 33 YOM complaining of lower leg pain. Pt denies chest pain or SOB. Pt states "I took my gabapentin but it didn't help". Pt is GCS 15, HEENT clear, PERRL, PMS x4, A & O x4. Pt safety is maintained bed in lowest position with bed rails up.

## 2023-05-30 ENCOUNTER — EMERGENCY (EMERGENCY)
Facility: HOSPITAL | Age: 33
LOS: 0 days | Discharge: HOME | End: 2023-05-30
Attending: EMERGENCY MEDICINE
Payer: MEDICAID

## 2023-05-30 VITALS
WEIGHT: 179.9 LBS | RESPIRATION RATE: 18 BRPM | OXYGEN SATURATION: 100 % | HEIGHT: 71 IN | DIASTOLIC BLOOD PRESSURE: 78 MMHG | SYSTOLIC BLOOD PRESSURE: 123 MMHG | TEMPERATURE: 98 F | HEART RATE: 72 BPM

## 2023-05-30 DIAGNOSIS — Z88.6 ALLERGY STATUS TO ANALGESIC AGENT: ICD-10-CM

## 2023-05-30 DIAGNOSIS — M54.9 DORSALGIA, UNSPECIFIED: ICD-10-CM

## 2023-05-30 DIAGNOSIS — M79.605 PAIN IN LEFT LEG: ICD-10-CM

## 2023-05-30 DIAGNOSIS — T42.6X6A UNDERDOSING OF OTHER ANTIEPILEPTIC AND SEDATIVE-HYPNOTIC DRUGS, INITIAL ENCOUNTER: ICD-10-CM

## 2023-05-30 DIAGNOSIS — Z88.1 ALLERGY STATUS TO OTHER ANTIBIOTIC AGENTS STATUS: ICD-10-CM

## 2023-05-30 DIAGNOSIS — G61.0 GUILLAIN-BARRE SYNDROME: ICD-10-CM

## 2023-05-30 DIAGNOSIS — M79.604 PAIN IN RIGHT LEG: ICD-10-CM

## 2023-05-30 DIAGNOSIS — J32.9 CHRONIC SINUSITIS, UNSPECIFIED: ICD-10-CM

## 2023-05-30 DIAGNOSIS — Z91.148 PATIENT'S OTHER NONCOMPLIANCE WITH MEDICATION REGIMEN FOR OTHER REASON: ICD-10-CM

## 2023-05-30 DIAGNOSIS — Z98.890 OTHER SPECIFIED POSTPROCEDURAL STATES: Chronic | ICD-10-CM

## 2023-05-30 DIAGNOSIS — G89.29 OTHER CHRONIC PAIN: ICD-10-CM

## 2023-05-30 DIAGNOSIS — Z21 ASYMPTOMATIC HUMAN IMMUNODEFICIENCY VIRUS [HIV] INFECTION STATUS: ICD-10-CM

## 2023-05-30 DIAGNOSIS — Z59.00 HOMELESSNESS UNSPECIFIED: ICD-10-CM

## 2023-05-30 LAB
ALBUMIN SERPL ELPH-MCNC: 3.6 G/DL — SIGNIFICANT CHANGE UP (ref 3.3–5)
ALP SERPL-CCNC: 60 U/L — SIGNIFICANT CHANGE UP (ref 40–120)
ALT FLD-CCNC: 27 U/L — SIGNIFICANT CHANGE UP (ref 12–78)
ANION GAP SERPL CALC-SCNC: 5 MMOL/L — SIGNIFICANT CHANGE UP (ref 5–17)
AST SERPL-CCNC: 32 U/L — SIGNIFICANT CHANGE UP (ref 15–37)
BASOPHILS # BLD AUTO: 0 K/UL — SIGNIFICANT CHANGE UP (ref 0–0.2)
BASOPHILS NFR BLD AUTO: 0 % — SIGNIFICANT CHANGE UP (ref 0–2)
BILIRUB SERPL-MCNC: 0.5 MG/DL — SIGNIFICANT CHANGE UP (ref 0.2–1.2)
BUN SERPL-MCNC: 14 MG/DL — SIGNIFICANT CHANGE UP (ref 7–23)
CALCIUM SERPL-MCNC: 8.3 MG/DL — LOW (ref 8.5–10.1)
CHLORIDE SERPL-SCNC: 109 MMOL/L — HIGH (ref 96–108)
CK SERPL-CCNC: 236 U/L — SIGNIFICANT CHANGE UP (ref 26–308)
CO2 SERPL-SCNC: 28 MMOL/L — SIGNIFICANT CHANGE UP (ref 22–31)
CREAT SERPL-MCNC: 0.78 MG/DL — SIGNIFICANT CHANGE UP (ref 0.5–1.3)
EGFR: 120 ML/MIN/1.73M2 — SIGNIFICANT CHANGE UP
EOSINOPHIL # BLD AUTO: 0.13 K/UL — SIGNIFICANT CHANGE UP (ref 0–0.5)
EOSINOPHIL NFR BLD AUTO: 4 % — SIGNIFICANT CHANGE UP (ref 0–6)
GLUCOSE SERPL-MCNC: 65 MG/DL — LOW (ref 70–99)
HCT VFR BLD CALC: 35.6 % — LOW (ref 39–50)
HGB BLD-MCNC: 11.7 G/DL — LOW (ref 13–17)
LYMPHOCYTES # BLD AUTO: 1.89 K/UL — SIGNIFICANT CHANGE UP (ref 1–3.3)
LYMPHOCYTES # BLD AUTO: 58 % — HIGH (ref 13–44)
MANUAL SMEAR VERIFICATION: SIGNIFICANT CHANGE UP
MCHC RBC-ENTMCNC: 29.2 PG — SIGNIFICANT CHANGE UP (ref 27–34)
MCHC RBC-ENTMCNC: 32.9 GM/DL — SIGNIFICANT CHANGE UP (ref 32–36)
MCV RBC AUTO: 88.8 FL — SIGNIFICANT CHANGE UP (ref 80–100)
MONOCYTES # BLD AUTO: 0.2 K/UL — SIGNIFICANT CHANGE UP (ref 0–0.9)
MONOCYTES NFR BLD AUTO: 6 % — SIGNIFICANT CHANGE UP (ref 2–14)
NEUTROPHILS # BLD AUTO: 0.75 K/UL — LOW (ref 1.8–7.4)
NEUTROPHILS NFR BLD AUTO: 21 % — LOW (ref 43–77)
NEUTS BAND # BLD: 2 % — SIGNIFICANT CHANGE UP (ref 0–8)
NRBC # BLD: 0 /100 — SIGNIFICANT CHANGE UP (ref 0–0)
NRBC # BLD: SIGNIFICANT CHANGE UP /100 WBCS (ref 0–0)
PLAT MORPH BLD: NORMAL — SIGNIFICANT CHANGE UP
PLATELET # BLD AUTO: 160 K/UL — SIGNIFICANT CHANGE UP (ref 150–400)
POTASSIUM SERPL-MCNC: 3.2 MMOL/L — LOW (ref 3.5–5.3)
POTASSIUM SERPL-SCNC: 3.2 MMOL/L — LOW (ref 3.5–5.3)
PROT SERPL-MCNC: 8 GM/DL — SIGNIFICANT CHANGE UP (ref 6–8.3)
RBC # BLD: 4.01 M/UL — LOW (ref 4.2–5.8)
RBC # FLD: 12.4 % — SIGNIFICANT CHANGE UP (ref 10.3–14.5)
RBC BLD AUTO: NORMAL — SIGNIFICANT CHANGE UP
SMUDGE CELLS # BLD: PRESENT — SIGNIFICANT CHANGE UP
SODIUM SERPL-SCNC: 142 MMOL/L — SIGNIFICANT CHANGE UP (ref 135–145)
VARIANT LYMPHS # BLD: 9 % — HIGH (ref 0–6)
WBC # BLD: 3.25 K/UL — LOW (ref 3.8–10.5)
WBC # FLD AUTO: 3.25 K/UL — LOW (ref 3.8–10.5)

## 2023-05-30 PROCEDURE — 99283 EMERGENCY DEPT VISIT LOW MDM: CPT | Mod: 25

## 2023-05-30 PROCEDURE — 80053 COMPREHEN METABOLIC PANEL: CPT

## 2023-05-30 PROCEDURE — 36415 COLL VENOUS BLD VENIPUNCTURE: CPT

## 2023-05-30 PROCEDURE — 85025 COMPLETE CBC W/AUTO DIFF WBC: CPT

## 2023-05-30 PROCEDURE — 99284 EMERGENCY DEPT VISIT MOD MDM: CPT

## 2023-05-30 PROCEDURE — 82550 ASSAY OF CK (CPK): CPT

## 2023-05-30 RX ORDER — SODIUM CHLORIDE 9 MG/ML
1000 INJECTION INTRAMUSCULAR; INTRAVENOUS; SUBCUTANEOUS ONCE
Refills: 0 | Status: COMPLETED | OUTPATIENT
Start: 2023-05-30 | End: 2023-05-30

## 2023-05-30 RX ORDER — OXYCODONE AND ACETAMINOPHEN 5; 325 MG/1; MG/1
1 TABLET ORAL
Qty: 20 | Refills: 0
Start: 2023-05-30 | End: 2023-06-03

## 2023-05-30 RX ORDER — POTASSIUM CHLORIDE 20 MEQ
20 PACKET (EA) ORAL ONCE
Refills: 0 | Status: COMPLETED | OUTPATIENT
Start: 2023-05-30 | End: 2023-05-30

## 2023-05-30 RX ADMIN — Medication 50 MILLIGRAM(S): at 16:20

## 2023-05-30 RX ADMIN — Medication 20 MILLIEQUIVALENT(S): at 17:45

## 2023-05-30 RX ADMIN — SODIUM CHLORIDE 1000 MILLILITER(S): 9 INJECTION INTRAMUSCULAR; INTRAVENOUS; SUBCUTANEOUS at 16:20

## 2023-05-30 SDOH — ECONOMIC STABILITY - HOUSING INSECURITY: HOMELESSNESS UNSPECIFIED: Z59.00

## 2023-05-30 NOTE — ED PROVIDER NOTE - PHYSICAL EXAMINATION
Constitutional: NAD AOx3  Eyes: PERRL EOMI  Head: Normocephalic atraumatic  Mouth: MMM  Cardiac: regular rate and rhythm  Resp: Lungs CTAB  GI: Abd s/nd/nt  Neuro: CN2-12 grossly intact, LAWSON x 4  MSK: Decreased motor in bilateral LEs. 4/5 strength RLE, 3/5 LLE. No erythema, no edema, no joint TTP at the ankle/knee/hip   Skin: No visible rashes

## 2023-05-30 NOTE — ED PROVIDER NOTE - CLINICAL SUMMARY MEDICAL DECISION MAKING FREE TEXT BOX
Pt on daily pain medicines for GBS including percocet, lyrica, gabapentin, and baclofen. Pt ran out of all but the gabapentin. Pt denies trauma, no evidence of new neurologic issue or infection or DVT. Will check labs, give pain medicine, observe, and reevaluate.

## 2023-05-30 NOTE — ED ADULT NURSE NOTE - NSICDXPASTMEDICALHX_GEN_ALL_CORE_FT
PAST MEDICAL HISTORY:  Asthma     Chronic sinusitis     Closed fracture of multiple ribs of right side, initial encounter     Cocaine abuse     GBS (Guillain [place] syndrome)     HIV (human immunodeficiency virus infection) from birth    Homeless

## 2023-05-30 NOTE — ED PROVIDER NOTE - NSICDXPASTMEDICALHX_GEN_ALL_CORE_FT
PAST MEDICAL HISTORY:  Asthma     Chronic sinusitis     Closed fracture of multiple ribs of right side, initial encounter     Cocaine abuse     GBS (Guillain [place] syndrome)     HIV (human immunodeficiency virus infection) from birth    Homeless

## 2023-05-30 NOTE — ED ADULT NURSE NOTE - NSFALLUNIVINTERV_ED_ALL_ED
Bed/Stretcher in lowest position, wheels locked, appropriate side rails in place/Call bell, personal items and telephone in reach/Instruct patient to call for assistance before getting out of bed/chair/stretcher/Non-slip footwear applied when patient is off stretcher/[place] to call system/Physically safe environment - no spills, clutter or unnecessary equipment/Purposeful proactive rounding/Room/bathroom lighting operational, light cord in reach

## 2023-05-30 NOTE — ED PROVIDER NOTE - OBJECTIVE STATEMENT
34 y/o male with a PMHx of asthma, chronic sinusitis, closed fracture of multiple ribs, cocaine abuse, GBS, HIV, homeless presents to the ED for evaluation. Pt c/o back pain and b/l LE pain since yesterday. On Lyrica, Baclofen, Oxycodone, Gabapentin, ran out of all medications but gabapentin. No falls or trauma. Pt ambulates with a cane. No other complaints at this time. PCP: Dr. Spencer.

## 2023-05-30 NOTE — ED PROVIDER NOTE - PATIENT PORTAL LINK FT
You can access the FollowMyHealth Patient Portal offered by [facility] by registering at the following website: [email or web address]. By joining [employer]’s FollowMyHealth portal, you will also be able to view your health information using other applications (apps) compatible with our system.

## 2023-05-30 NOTE — ED ADULT TRIAGE NOTE - CHIEF COMPLAINT QUOTE
pt presents to ed for evaluation of worsening bilateral leg weakness and pain- pt has hx of duffy barres s/p flu vaccine, takes gabapentin. ambulates with a cane at baseline

## 2023-05-30 NOTE — ED PROVIDER NOTE - PROGRESS NOTE DETAILS
d/w icu myra jerry for consult. uopdated fmaily. orderd abx and ct angio chest MD BONITA pt feeling better, labs ok. dc home .advised to call his pcp tomorrow to refill his meds. MD BONITA

## 2023-06-04 ENCOUNTER — EMERGENCY (EMERGENCY)
Facility: HOSPITAL | Age: 33
LOS: 1 days | Discharge: LEFT BEFORE DISCHARGE | End: 2023-06-04
Payer: MEDICAID

## 2023-06-04 VITALS
WEIGHT: 220.02 LBS | HEIGHT: 71 IN | HEART RATE: 76 BPM | DIASTOLIC BLOOD PRESSURE: 86 MMHG | RESPIRATION RATE: 16 BRPM | OXYGEN SATURATION: 98 % | TEMPERATURE: 98 F | SYSTOLIC BLOOD PRESSURE: 136 MMHG

## 2023-06-04 DIAGNOSIS — Z98.890 OTHER SPECIFIED POSTPROCEDURAL STATES: Chronic | ICD-10-CM

## 2023-06-04 PROCEDURE — L9991: CPT

## 2023-06-06 NOTE — ED ADULT NURSE NOTE - EXTENSIONS OF SELF_ADULT
[FreeTextEntry1] : Brief recommendations and follow-up: (see above for details)\par \par The patient's overall cardiovascular status is stable but she needs attention to risk factor reduction.\par As noted, I am taking the liberty of starting atorvastatin 10 mg daily.\par She will have laboratories prior to her visit with Dr. Deutsch.\par Blood pressure is borderline but it is generally been under good control.\par Therapeutic lifestyle treatment advised and follow-up with her primary care physician.\par I again suggested formal nutritional consultation.  She will consider.\par Arrhythmias have been under very good control with a history of ablation.\par Next routine cardiology visit 1 year.
None

## 2023-06-07 NOTE — PHYSICAL THERAPY INITIAL EVALUATION ADULT - GAIT DISTANCE, PT EVAL
"\"I feel less anxious now\"     Cassie Chavez RN  06/07/23 0209    " 3 steps at bedside, pt with bilateral knee buckling, requiring therapist assist to recover

## 2023-06-08 NOTE — ED ADULT NURSE NOTE - CHIEF COMPLAINT
General: Well appearing in no acute distress, alert and cooperative  Head: Normocephalic, atraumatic  Eyes: PERRLA, no conjunctival injection, no scleral icterus, EOMI  ENMT: Atraumatic external nose and ears  Neck: Soft and supple, full ROM without pain  Cardiac: tachycardic rate and irregular rhythm, no murmurs, peripheral pulses 2+ and symmetric in all extremities, no LE edema.  Resp: Unlabored respiratory effort, lungs CTAB  Abd: Soft, non-tender, non-distended  MSK: Spine midline and non-tender  Skin: Warm and dry, no rashes  Neuro: AO x 3, moves all extremities symmetrically, Motor strength 5/5 bilaterally UE and LE, sensation grossly intact The patient is a 31y Male complaining of weakness.

## 2023-06-14 NOTE — ED ADULT NURSE NOTE - NIH STROKE SCALE: 6B. MOTOR LEG, RIGHT, QM
"  Subjective:     Shala Figueredo  is a 18 y.o. female who presents for Pharyngitis (Ex to strep and mono. /At home strep test POS)       She presents today with pharyngitis symptoms have been ongoing for last 1-2 days.  Notes multiple recent close sick contacts at home whom have tested positive for strep and mono.  She did perform at home strep test which was positive.  Is having pain with swallowing, denies dysphagia.  Has associated general malaise, low-grade fever.  She also has episodic nausea, no vomiting.  No chest pain or shortness of breath, no abdominal pain, no diarrhea.       Review of Systems   Constitutional:  Positive for fever and malaise/fatigue. Negative for chills and diaphoresis.   HENT:  Positive for sore throat. Negative for congestion, ear discharge and sinus pain.    Eyes:  Negative for pain, discharge and redness.   Respiratory:  Negative for cough, shortness of breath and wheezing.    Cardiovascular:  Negative for chest pain.   Gastrointestinal:  Positive for nausea. Negative for abdominal pain, constipation, diarrhea and vomiting.   Genitourinary:  Negative for dysuria, frequency and urgency.   Neurological:  Negative for dizziness and headaches.      No Known Allergies  Past Medical History:   Diagnosis Date    Allergy     ASTHMA     Eczema         Objective:   /67   Pulse (!) 107   Temp 36.9 °C (98.5 °F) (Temporal)   Resp 18   Ht 1.727 m (5' 8\")   Wt 77.1 kg (170 lb)   SpO2 97%   BMI 25.85 kg/m²   Physical Exam  Vitals and nursing note reviewed.   Constitutional:       General: She is not in acute distress.     Appearance: Normal appearance. She is not ill-appearing, toxic-appearing or diaphoretic.   HENT:      Head: Normocephalic.      Right Ear: Tympanic membrane, ear canal and external ear normal. There is no impacted cerumen.      Left Ear: Tympanic membrane, ear canal and external ear normal. There is no impacted cerumen.      Nose: No congestion or rhinorrhea.      " Mouth/Throat:      Mouth: Mucous membranes are moist.      Pharynx: Oropharyngeal exudate and posterior oropharyngeal erythema present.      Comments: Grade 2 tonsillar edema  Eyes:      General:         Right eye: No discharge.         Left eye: No discharge.      Conjunctiva/sclera: Conjunctivae normal.   Cardiovascular:      Rate and Rhythm: Normal rate and regular rhythm.   Pulmonary:      Effort: Pulmonary effort is normal. No respiratory distress.      Breath sounds: Normal breath sounds. No stridor. No wheezing or rhonchi.   Musculoskeletal:      Cervical back: Neck supple.   Lymphadenopathy:      Cervical: No cervical adenopathy.   Neurological:      General: No focal deficit present.      Mental Status: She is alert and oriented to person, place, and time.   Psychiatric:         Mood and Affect: Mood normal.         Behavior: Behavior normal.         Thought Content: Thought content normal.         Judgment: Judgment normal.             Diagnostic testing: None    Assessment/Plan:     Encounter Diagnoses   Name Primary?    Acute streptococcal pharyngitis           Plan for care for today's complaint includes treatment with amoxicillin for acute streptococcal pharyngitis.  Based on physical exam findings and symptom presentation, Emperatriz pineda does have high possibility of strep pharyngitis, due to her recent exposure to multiple persons with strep pharyngitis we will treat at this time.  Continue to monitor symptoms and return to urgent care or follow-up with primary care provider if symptoms remain ongoing.  Follow-up in the emergency department if symptoms become severe, ER precautions discussed in office today..  Prescription for amoxicillin provided.    See AVS Instructions below for written guidance provided to patient on after-visit management and care in addition to our verbal discussion during the visit.    Please note that this dictation was created using voice recognition software. I have  attempted to correct all errors, but there may be sound-alike, spelling, grammar and possibly content errors that I did not discover before finalizing the note.    Scioto Mike HAINES   (0) No drift; leg holds 30 degree position for full 5 secs

## 2023-06-15 ENCOUNTER — EMERGENCY (EMERGENCY)
Facility: HOSPITAL | Age: 33
LOS: 0 days | Discharge: HOME | End: 2023-06-15
Attending: HOSPITALIST
Payer: MEDICAID

## 2023-06-15 VITALS
TEMPERATURE: 98 F | DIASTOLIC BLOOD PRESSURE: 59 MMHG | OXYGEN SATURATION: 98 % | HEART RATE: 63 BPM | RESPIRATION RATE: 17 BRPM | SYSTOLIC BLOOD PRESSURE: 108 MMHG

## 2023-06-15 VITALS
HEIGHT: 71 IN | SYSTOLIC BLOOD PRESSURE: 116 MMHG | HEART RATE: 68 BPM | TEMPERATURE: 98 F | OXYGEN SATURATION: 99 % | WEIGHT: 169.98 LBS | DIASTOLIC BLOOD PRESSURE: 74 MMHG | RESPIRATION RATE: 18 BRPM
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
## 2023-06-15 DIAGNOSIS — Z21 ASYMPTOMATIC HUMAN IMMUNODEFICIENCY VIRUS [HIV] INFECTION STATUS: ICD-10-CM

## 2023-06-15 DIAGNOSIS — M54.50 LOW BACK PAIN, UNSPECIFIED: ICD-10-CM

## 2023-06-15 DIAGNOSIS — Z88.6 ALLERGY STATUS TO ANALGESIC AGENT: ICD-10-CM

## 2023-06-15 DIAGNOSIS — J45.909 UNSPECIFIED ASTHMA, UNCOMPLICATED: ICD-10-CM

## 2023-06-15 DIAGNOSIS — Z59.00 HOMELESSNESS UNSPECIFIED: ICD-10-CM

## 2023-06-15 DIAGNOSIS — Z88.1 ALLERGY STATUS TO OTHER ANTIBIOTIC AGENTS STATUS: ICD-10-CM

## 2023-06-15 DIAGNOSIS — Z98.890 OTHER SPECIFIED POSTPROCEDURAL STATES: Chronic | ICD-10-CM

## 2023-06-15 DIAGNOSIS — Z87.81 PERSONAL HISTORY OF (HEALED) TRAUMATIC FRACTURE: ICD-10-CM
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
## 2023-06-15 PROCEDURE — 99284 EMERGENCY DEPT VISIT MOD MDM: CPT | Mod: 25

## 2023-06-15 PROCEDURE — 72100 X-RAY EXAM L-S SPINE 2/3 VWS: CPT

## 2023-06-15 PROCEDURE — 99284 EMERGENCY DEPT VISIT MOD MDM: CPT

## 2023-06-15 PROCEDURE — 72100 X-RAY EXAM L-S SPINE 2/3 VWS: CPT | Mod: 26

## 2023-06-15 SDOH — ECONOMIC STABILITY - HOUSING INSECURITY: HOMELESSNESS UNSPECIFIED: Z59.00

## 2023-06-15 NOTE — ED ADULT TRIAGE NOTE - CHIEF COMPLAINT QUOTE
Pt. is A&OX3, BIBEMS with c/o back pain. EMS reports having back pain from an injury at work. Pt. reports having chronic back pain but hurting it this morning at work. Pt. has chronic numbness and tingling in legs.

## 2023-06-15 NOTE — ED PROVIDER NOTE - PHYSICAL EXAMINATION
***GEN - NAD; well appearing, resting comfortably in stretcher. ; A+O x3 ***HEAD - NC/AT ***EYES/NOSE - PERRL, EOMI, mucous membranes moist, no discharge ***THROAT: Oral cavity and pharynx normal. No inflammation, swelling, exudate, or lesions.  ***NECK: Neck supple, non-tender   ***PULMONARY - CTA b/l, symmetric breath sounds. ***CARDIAC -s1s2, RRR, no M,G,R  ***ABDOMEN - +BS, ND, NT, soft  ***BACK - no CVA tenderness, +ttp over midline lumbar spine. no stepoff or deformity palpated.   ***EXTREMITIES - symmetric pulses, 2+ dp, capillary refill < 2 seconds, no clubbing, no cyanosis, no edema ***SKIN - no rash or bruising   ***NEUROLOGIC - alert, CN 2-12 intact

## 2023-06-15 NOTE — ED ADULT NURSE NOTE - OBJECTIVE STATEMENT
33y male presented to the ED with complaints of lower back pain. Pt states he was at work today, he lifted a heavy box at work and fell forwards over the box and has had pain ever since. Pt did not take any medication PTA. Pt states he has been having difficulty walking and standing up straight.

## 2023-06-15 NOTE — ED PROVIDER NOTE - NSFOLLOWUPINSTRUCTIONS_ED_ALL_ED_FT
hronic Back Pain  When back pain lasts longer than 3 months, it is called chronic back pain. The cause of your back pain may not be known. Some common causes include:  Wear and tear (degenerative disease) of the bones, ligaments, or disks in your back.  Inflammation and stiffness in your back (arthritis).  People who have chronic back pain often go through certain periods in which the pain is more intense (flare-ups). Many people can learn to manage the pain with home care.    Follow these instructions at home:  Pay attention to any changes in your symptoms. Take these actions to help with your pain:    Managing pain and stiffness        If directed, apply ice to the painful area. Your health care provider may recommend applying ice during the first 24–48 hours after a flare-up begins. To do this:  Put ice in a plastic bag.  Place a towel between your skin and the bag.  Leave the ice on for 20 minutes, 2–3 times per day.  If directed, apply heat to the affected area as often as told by your health care provider. Use the heat source that your health care provider recommends, such as a moist heat pack or a heating pad.  Place a towel between your skin and the heat source.  Leave the heat on for 20–30 minutes.  Remove the heat if your skin turns bright red. This is especially important if you are unable to feel pain, heat, or cold. You may have a greater risk of getting burned.  Try soaking in a warm tub.  Activity      Avoid bending and other activities that make the problem worse.  Maintain a proper position when standing or sitting:  When standing, keep your upper back and neck straight, with your shoulders pulled back. Avoid slouching.  When sitting, keep your back straight and relax your shoulders. Do not round your shoulders or pull them backward.  Do not sit or  one place for long periods of time.  Take brief periods of rest throughout the day. This will reduce your pain. Resting in a lying or standing position is usually better than sitting to rest.  When you are resting for longer periods, mix in some mild activity or stretching between periods of rest. This will help to prevent stiffness and pain.  Get regular exercise. Ask your health care provider what activities are safe for you.  Do not lift anything that is heavier than 10 lb (4.5 kg), or the limit that you are told, until your health care provider says that it is safe. Always use proper lifting technique, which includes:  Bending your knees.  Keeping the load close to your body.  Avoiding twisting.  Sleep on a firm mattress in a comfortable position. Try lying on your side with your knees slightly bent. If you lie on your back, put a pillow under your knees.  Medicines    Treatment may include medicines for pain and inflammation taken by mouth or applied to the skin, prescription pain medicine, or muscle relaxants. Take over-the-counter and prescription medicines only as told by your health care provider.  Ask your health care provider if the medicine prescribed to you:  Requires you to avoid driving or using machinery.  Can cause constipation. You may need to take these actions to prevent or treat constipation:  Drink enough fluid to keep your urine pale yellow.  Take over-the-counter or prescription medicines.  Eat foods that are high in fiber, such as beans, whole grains, and fresh fruits and vegetables.  Limit foods that are high in fat and processed sugars, such as fried or sweet foods.  General instructions    Do not use any products that contain nicotine or tobacco, such as cigarettes, e-cigarettes, and chewing tobacco. If you need help quitting, ask your health care provider.  Keep all follow-up visits as told by your health care provider. This is important.  Contact a health care provider if:  You have pain that is not relieved with rest or medicine.  Your pain gets worse, or you have new pain.  You have a high fever.  You have rapid weight loss.  You have trouble doing your normal activities.  Get help right away if:  You have weakness or numbness in one or both of your legs or feet.  You have trouble controlling your bladder or your bowels.  You have severe back pain and have any of the following:  Nausea or vomiting.  Pain in your abdomen.  Shortness of breath or you faint.  Summary  Chronic back pain is back pain that lasts longer than 3 months.  When a flare-up begins, apply ice to the painful area for the first 24–48 hours.  Apply a moist heat pad or use a heating pad on the painful area as directed by your health care provider.  When you are resting for longer periods, mix in some mild activity or stretching between periods of rest. This will help to prevent stiffness and pain.  This information is not intended to replace advice given to you by your health care provider. Make sure you discuss any questions you have with your health care provider.    Document Revised: 01/27/2021 Document Reviewed: 01/27/2021

## 2023-06-15 NOTE — ED PROVIDER NOTE - CARE PROVIDER_API CALL
Emre Lubin  Orthopaedic Surgery  [address] [place] [id number]  Phone: [phone]  Fax: [phone]  Follow Up Time:

## 2023-06-15 NOTE — ED ADULT NURSE NOTE - NSFALLUNIVINTERV_ED_ALL_ED
Bed/Stretcher in lowest position, wheels locked, appropriate side rails in place/Call bell, personal items and telephone in reach/Instruct patient to call for assistance before getting out of bed/chair/stretcher/Non-slip footwear applied when patient is off stretcher/[place] to call system/Physically safe environment - no spills, clutter or unnecessary equipment/Purposeful proactive rounding/Room/bathroom lighting operational, light cord in reach

## 2023-06-15 NOTE — ED PROVIDER NOTE - NSICDXPASTMEDICALHX_GEN_ALL_CORE_FT
PAST MEDICAL HISTORY:  Asthma     Chronic sinusitis     Closed fracture of multiple ribs of right side, initial encounter     Cocaine abuse     GBS (Guillain [place] syndrome)     HIV (human immunodeficiency virus infection) from birth    Homeless

## 2023-06-15 NOTE — ED PROVIDER NOTE - OBJECTIVE STATEMENT
33-year-old male with chronic back pain presents with worsening back pain.  Patient states he was at work today and he lifted a box and then accidentally fell onto the box on his back.  States he is having some lower back pain and Pain in the midline area of the lower back.  Was able to leave work and take the bus here to [place] independently despite the pain.   No weakness numbness or tingling of his lower extremities.

## 2023-06-15 NOTE — ED PROVIDER NOTE - PATIENT PORTAL LINK FT
You can access the FollowMyHealth Patient Portal offered by [facility] by registering at the following website: [email or web address]. By joining [employer]’s FollowMyHealth portal, you will also be able to view your health information using other applications (apps) compatible with our system.

## 2023-06-17 ENCOUNTER — INPATIENT (INPATIENT)
Facility: HOSPITAL | Age: 33
LOS: 6 days | Discharge: LEFT BEFORE DISCHARGE | End: 2023-06-24
Attending: INTERNAL MEDICINE | Admitting: INTERNAL MEDICINE
Payer: MEDICAID

## 2023-06-17 VITALS
TEMPERATURE: 98 F | OXYGEN SATURATION: 99 % | HEART RATE: 67 BPM | RESPIRATION RATE: 16 BRPM | DIASTOLIC BLOOD PRESSURE: 82 MMHG | HEIGHT: 71 IN | SYSTOLIC BLOOD PRESSURE: 131 MMHG

## 2023-06-17 DIAGNOSIS — Z98.890 OTHER SPECIFIED POSTPROCEDURAL STATES: Chronic | ICD-10-CM

## 2023-06-17 DIAGNOSIS — R29.898 OTHER SYMPTOMS AND SIGNS INVOLVING THE MUSCULOSKELETAL SYSTEM: ICD-10-CM

## 2023-06-17 DIAGNOSIS — M54.9 DORSALGIA, UNSPECIFIED: ICD-10-CM

## 2023-06-17 DIAGNOSIS — Z29.9 ENCOUNTER FOR PROPHYLACTIC MEASURES, UNSPECIFIED: ICD-10-CM

## 2023-06-17 DIAGNOSIS — B20 HUMAN IMMUNODEFICIENCY VIRUS [HIV] DISEASE: ICD-10-CM

## 2023-06-17 LAB
ALBUMIN SERPL ELPH-MCNC: 4 G/DL — SIGNIFICANT CHANGE UP (ref 3.3–5)
ALP SERPL-CCNC: 57 U/L — SIGNIFICANT CHANGE UP (ref 40–120)
ALT FLD-CCNC: 17 U/L — SIGNIFICANT CHANGE UP (ref 4–41)
ANION GAP SERPL CALC-SCNC: 13 MMOL/L — SIGNIFICANT CHANGE UP (ref 7–14)
AST SERPL-CCNC: 31 U/L — SIGNIFICANT CHANGE UP (ref 4–40)
BASOPHILS # BLD AUTO: 0 K/UL — SIGNIFICANT CHANGE UP (ref 0–0.2)
BASOPHILS NFR BLD AUTO: 0 % — SIGNIFICANT CHANGE UP (ref 0–2)
BILIRUB SERPL-MCNC: 0.5 MG/DL — SIGNIFICANT CHANGE UP (ref 0.2–1.2)
BUN SERPL-MCNC: 11 MG/DL — SIGNIFICANT CHANGE UP (ref 7–23)
CALCIUM SERPL-MCNC: 8.5 MG/DL — SIGNIFICANT CHANGE UP (ref 8.4–10.5)
CHLORIDE SERPL-SCNC: 105 MMOL/L — SIGNIFICANT CHANGE UP (ref 98–107)
CO2 SERPL-SCNC: 23 MMOL/L — SIGNIFICANT CHANGE UP (ref 22–31)
CREAT SERPL-MCNC: 0.64 MG/DL — SIGNIFICANT CHANGE UP (ref 0.5–1.3)
EGFR: 127 ML/MIN/1.73M2 — SIGNIFICANT CHANGE UP
EOSINOPHIL # BLD AUTO: 0.02 K/UL — SIGNIFICANT CHANGE UP (ref 0–0.5)
EOSINOPHIL NFR BLD AUTO: 0.9 % — SIGNIFICANT CHANGE UP (ref 0–6)
GIANT PLATELETS BLD QL SMEAR: PRESENT — SIGNIFICANT CHANGE UP
GLUCOSE SERPL-MCNC: 76 MG/DL — SIGNIFICANT CHANGE UP (ref 70–99)
HCT VFR BLD CALC: 34.2 % — LOW (ref 39–50)
HGB BLD-MCNC: 10.9 G/DL — LOW (ref 13–17)
IANC: 0.4 K/UL — LOW (ref 1.8–7.4)
LYMPHOCYTES # BLD AUTO: 1.81 K/UL — SIGNIFICANT CHANGE UP (ref 1–3.3)
LYMPHOCYTES # BLD AUTO: 70.2 % — HIGH (ref 13–44)
MANUAL SMEAR VERIFICATION: SIGNIFICANT CHANGE UP
MCHC RBC-ENTMCNC: 27.9 PG — SIGNIFICANT CHANGE UP (ref 27–34)
MCHC RBC-ENTMCNC: 31.9 GM/DL — LOW (ref 32–36)
MCV RBC AUTO: 87.5 FL — SIGNIFICANT CHANGE UP (ref 80–100)
MONOCYTES # BLD AUTO: 0.14 K/UL — SIGNIFICANT CHANGE UP (ref 0–0.9)
MONOCYTES NFR BLD AUTO: 5.3 % — SIGNIFICANT CHANGE UP (ref 2–14)
NEUTROPHILS # BLD AUTO: 0.54 K/UL — LOW (ref 1.8–7.4)
NEUTROPHILS NFR BLD AUTO: 17.5 % — LOW (ref 43–77)
NEUTS BAND # BLD: 3.5 % — SIGNIFICANT CHANGE UP (ref 0–6)
NRBC # BLD: 2 /100 — HIGH (ref 0–0)
PLAT MORPH BLD: NORMAL — SIGNIFICANT CHANGE UP
PLATELET # BLD AUTO: 189 K/UL — SIGNIFICANT CHANGE UP (ref 150–400)
PLATELET COUNT - ESTIMATE: NORMAL — SIGNIFICANT CHANGE UP
POTASSIUM SERPL-MCNC: 3.6 MMOL/L — SIGNIFICANT CHANGE UP (ref 3.5–5.3)
POTASSIUM SERPL-SCNC: 3.6 MMOL/L — SIGNIFICANT CHANGE UP (ref 3.5–5.3)
PROT SERPL-MCNC: 7.7 G/DL — SIGNIFICANT CHANGE UP (ref 6–8.3)
RBC # BLD: 3.91 M/UL — LOW (ref 4.2–5.8)
RBC # FLD: 12.8 % — SIGNIFICANT CHANGE UP (ref 10.3–14.5)
RBC BLD AUTO: NORMAL — SIGNIFICANT CHANGE UP
SMUDGE CELLS # BLD: PRESENT — SIGNIFICANT CHANGE UP
SODIUM SERPL-SCNC: 141 MMOL/L — SIGNIFICANT CHANGE UP (ref 135–145)
VARIANT LYMPHS # BLD: 2.6 % — SIGNIFICANT CHANGE UP (ref 0–6)
WBC # BLD: 2.58 K/UL — LOW (ref 3.8–10.5)
WBC # FLD AUTO: 2.58 K/UL — LOW (ref 3.8–10.5)

## 2023-06-17 PROCEDURE — 99223 1ST HOSP IP/OBS HIGH 75: CPT

## 2023-06-17 PROCEDURE — 99285 EMERGENCY DEPT VISIT HI MDM: CPT

## 2023-06-17 RX ORDER — BICTEGRAVIR SODIUM, EMTRICITABINE, AND TENOFOVIR ALAFENAMIDE FUMARATE 30; 120; 15 MG/1; MG/1; MG/1
1 TABLET ORAL DAILY
Refills: 0 | Status: DISCONTINUED | OUTPATIENT
Start: 2023-06-17 | End: 2023-06-24

## 2023-06-17 RX ORDER — METHOCARBAMOL 500 MG/1
500 TABLET, FILM COATED ORAL THREE TIMES A DAY
Refills: 0 | Status: DISCONTINUED | OUTPATIENT
Start: 2023-06-17 | End: 2023-06-19

## 2023-06-17 RX ORDER — CYCLOBENZAPRINE HYDROCHLORIDE 10 MG/1
5 TABLET, FILM COATED ORAL ONCE
Refills: 0 | Status: COMPLETED | OUTPATIENT
Start: 2023-06-17 | End: 2023-06-17

## 2023-06-17 RX ORDER — ACETAMINOPHEN 500 MG
1000 TABLET ORAL ONCE
Refills: 0 | Status: COMPLETED | OUTPATIENT
Start: 2023-06-17 | End: 2023-06-17

## 2023-06-17 RX ORDER — QUETIAPINE FUMARATE 200 MG/1
100 TABLET, FILM COATED ORAL DAILY
Refills: 0 | Status: DISCONTINUED | OUTPATIENT
Start: 2023-06-17 | End: 2023-06-24

## 2023-06-17 RX ORDER — ENOXAPARIN SODIUM 100 MG/ML
40 INJECTION SUBCUTANEOUS EVERY 24 HOURS
Refills: 0 | Status: DISCONTINUED | OUTPATIENT
Start: 2023-06-17 | End: 2023-06-24

## 2023-06-17 RX ORDER — LANOLIN ALCOHOL/MO/W.PET/CERES
3 CREAM (GRAM) TOPICAL AT BEDTIME
Refills: 0 | Status: DISCONTINUED | OUTPATIENT
Start: 2023-06-17 | End: 2023-06-24

## 2023-06-17 RX ORDER — QUETIAPINE FUMARATE 200 MG/1
300 TABLET, FILM COATED ORAL AT BEDTIME
Refills: 0 | Status: DISCONTINUED | OUTPATIENT
Start: 2023-06-17 | End: 2023-06-24

## 2023-06-17 RX ORDER — LIDOCAINE 4 G/100G
1 CREAM TOPICAL ONCE
Refills: 0 | Status: COMPLETED | OUTPATIENT
Start: 2023-06-17 | End: 2023-06-17

## 2023-06-17 RX ORDER — NICOTINE POLACRILEX 2 MG
1 GUM BUCCAL DAILY
Refills: 0 | Status: DISCONTINUED | OUTPATIENT
Start: 2023-06-17 | End: 2023-06-24

## 2023-06-17 RX ORDER — BENZOCAINE AND MENTHOL 5; 1 G/100ML; G/100ML
1 LIQUID ORAL
Refills: 0 | Status: DISCONTINUED | OUTPATIENT
Start: 2023-06-17 | End: 2023-06-24

## 2023-06-17 RX ORDER — GABAPENTIN 400 MG/1
300 CAPSULE ORAL ONCE
Refills: 0 | Status: COMPLETED | OUTPATIENT
Start: 2023-06-17 | End: 2023-06-17

## 2023-06-17 RX ORDER — ACETAMINOPHEN 500 MG
975 TABLET ORAL EVERY 6 HOURS
Refills: 0 | Status: DISCONTINUED | OUTPATIENT
Start: 2023-06-17 | End: 2023-06-19

## 2023-06-17 RX ORDER — LIDOCAINE 4 G/100G
1 CREAM TOPICAL DAILY
Refills: 0 | Status: DISCONTINUED | OUTPATIENT
Start: 2023-06-17 | End: 2023-06-24

## 2023-06-17 RX ADMIN — Medication 50 MILLIGRAM(S): at 22:49

## 2023-06-17 RX ADMIN — QUETIAPINE FUMARATE 300 MILLIGRAM(S): 200 TABLET, FILM COATED ORAL at 22:49

## 2023-06-17 RX ADMIN — Medication 1000 MILLIGRAM(S): at 05:15

## 2023-06-17 RX ADMIN — METHOCARBAMOL 500 MILLIGRAM(S): 500 TABLET, FILM COATED ORAL at 22:49

## 2023-06-17 RX ADMIN — CYCLOBENZAPRINE HYDROCHLORIDE 5 MILLIGRAM(S): 10 TABLET, FILM COATED ORAL at 05:15

## 2023-06-17 RX ADMIN — LIDOCAINE 1 PATCH: 4 CREAM TOPICAL at 18:15

## 2023-06-17 RX ADMIN — LIDOCAINE 1 PATCH: 4 CREAM TOPICAL at 05:18

## 2023-06-17 RX ADMIN — GABAPENTIN 300 MILLIGRAM(S): 400 CAPSULE ORAL at 06:16

## 2023-06-17 NOTE — ED ADULT NURSE NOTE - CHIEF COMPLAINT QUOTE
Pt BIBEMS c/o lower back pain radiating down b/l legs x1 week, worsened today. Pt states "it feels like spasms." Pt noted to have involuntary twitches, states this is new. Denies any numbness, tingling or recent trauma. PHx HIV, Tashia [place] Syndrome.

## 2023-06-17 NOTE — PROVIDER CONTACT NOTE (OTHER) - ASSESSMENT
[employer] #[phone]. Writer met with pt at bedside to confirm d/c address. Pt provided updated address of [address]. [employer] updated with adjustments made to trip. Pt  however also being reevaluated by provider at this time. SW informed that d/c is on hold as pt is having difficulty ambulating. [employer] contacted and aware. Yes

## 2023-06-17 NOTE — ED PROVIDER NOTE - ATTENDING CONTRIBUTION TO CARE
I performed a face-to-face evaluation of the patient and performed a history and physical examination along with the resident or ACP, and/or medical student above.  I agree with the history and physical examination as documented by the resident or ACP, and/or medical student above.  La:  GEN: Patient awake alert NAD.   HEENT: normocephalic, atraumatic, moist MM  CARDIAC: RRR, S1, S2, no murmur.   PULM: CTA B/L no wheeze, rhonchi, rales.   ABD: soft NT, ND, no rebound no guarding  MSK: Moving all extremities, no edema. 5/5 strength and full ROM in all extremities.  b/l thoracolumbar paraspinal ttp   NEURO: A&Ox3, no focal neurological deficits, no CTL midline spinal ttp  SKIN: warm, dry, no rash.

## 2023-06-17 NOTE — ED PROVIDER NOTE - PHYSICAL EXAMINATION
GEN: Patient awake alert NAD.   HEENT: normocephalic, atraumatic, moist MM  CARDIAC: RRR, S1, S2, no murmur.   PULM: CTA B/L no wheeze, rhonchi, rales.   ABD: soft NT, ND, no rebound no guarding  MSK: Moving all extremities, no edema. 5/5 strength and full ROM in all extremities.  b/l thoracolumbar paraspinal ttp   NEURO: A&Ox3, no focal neurological deficits, no CTL midline spinal ttp  SKIN: warm, dry, no rash.

## 2023-06-17 NOTE — ED PROVIDER NOTE - NSICDXPASTMEDICALHX_GEN_ALL_CORE_FT
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

## 2023-06-17 NOTE — H&P ADULT - PROBLEM SELECTOR PLAN 2
Chronic per patient since being diagnosed with GBS  -PT eval  -low suspicion for acute neurologic process/cord compression

## 2023-06-17 NOTE — H&P ADULT - PSYCHIATRIC
Called patient. LVM requesting call back to schedule lab apt. Explained labs need to be drawn today or tomorrow for next week's apt.    normal affect/alert and oriented x3/normal behavior

## 2023-06-17 NOTE — H&P ADULT - NSHPLABSRESULTS_GEN_ALL_CORE
10.9   2.58  )-----------( 189      ( 17 Jun 2023 07:00 )             34.2     06-17    141  |  105  |  11  ----------------------------<  76  3.6   |  23  |  0.64    Ca    8.5      17 Jun 2023 07:00    TPro  7.7  /  Alb  4.0  /  TBili  0.5  /  DBili  x   /  AST  31  /  ALT  17  /  AlkPhos  57  06-17

## 2023-06-17 NOTE — PROVIDER CONTACT NOTE (OTHER) - BACKGROUND
MD requested assistance with MEDICAID TAXI for pt. [facility] to [facility] [phone]. Writer spoke with Brent who arranged reservation #[phone] with p/up by

## 2023-06-17 NOTE — ED PROVIDER NOTE - NSFOLLOWUPINSTRUCTIONS_ED_ALL_ED_FT
1.  Please take Tylenol, as needed for pain.  Please follow instructions on packaging.  2.  Please continue taking all your medications as prescribed.    Back Pain    Back pain is very common in adults. The cause of back pain is rarely dangerous and the pain often gets better over time. The cause of your back pain may not be known and may include strain of muscles or ligaments, degeneration of the spinal disks, or arthritis. Occasionally the pain may radiate down your leg(s). Over-the-counter medicines to reduce pain and inflammation are often the most helpful. Stretching and remaining active frequently helps the healing process.       Rest, no heavy lifting.  Light walking encouraged, advanced activity as tolerated.    Warm compresses to area.     Recommend outpatient orthopedics follow up to discuss possible MRI vs Physical Therapy- referral list provided.         SEEK IMMEDIATE MEDICAL CARE IF YOU HAVE ANY OF THE FOLLOWING SYMPTOMS: bowel or bladder control problems, unusual weakness or numbness in your arms or legs, nausea or vomiting, abdominal pain, fever, dizziness/lightheadedness.

## 2023-06-17 NOTE — ED ADULT NURSE NOTE - OBJECTIVE STATEMENT
Patient came in with the complaints of Lower back pain. Medications given as ordered. Patient tolerated well. Nursing care continues

## 2023-06-17 NOTE — H&P ADULT - NSICDXPASTMEDICALHX_GEN_ALL_CORE_FT
PAST MEDICAL HISTORY:  Asthma     Chronic sinusitis     Closed fracture of multiple ribs of right side, initial encounter     Cocaine abuse     GBS (Guillain [place] syndrome)     HIV (human immunodeficiency virus infection) from birth    Homeless

## 2023-06-17 NOTE — ED PROVIDER NOTE - PROGRESS NOTE DETAILS
Oliver Simental, DO PGY-2: When patient attempted to stand and ambulate patient's legs wobbled patient nearly fell.  Will admit patient for PT evaluation. Eliu PGY2: Patient endorsed to me at sign out. Seen and assessed at bedside. patient sleeping comfortably, not requesting additional pain meds at this time. Came by EMS per pt and states that when he's lying flat his pain is controlled but when trying to stand/walk his pain is much worse. Discussed trying to walk but he declined. States he takes his HIV meds. Agreeable to admit.

## 2023-06-17 NOTE — H&P ADULT - PROBLEM SELECTOR PLAN 3
C/w Biktarvy  -check HIV viral load and CD4 count in AM  -CD4 count in December 2022 was 145, viral load still detectable in January 2023 No

## 2023-06-17 NOTE — ED ADULT NURSE NOTE - NSFALLRISKINTERV_ED_ALL_ED

## 2023-06-17 NOTE — PATIENT PROFILE ADULT - FALL HARM RISK - HARM RISK INTERVENTIONS
Assistance with ambulation/Assistance OOB with selected safe patient handling equipment/Communicate Risk of Fall with Harm to all staff/Monitor for mental status changes/Monitor gait and stability/Reinforce activity limits and safety measures with patient and family/Reorient to person, place and time as needed/Review medications for side effects contributing to fall risk/Sit up slowly, dangle for a short time, stand at bedside before walking/Tailored Fall Risk Interventions/Toileting schedule using arm’s reach rule for commode and bathroom/Use of alarms - bed, chair and/or voice tab/Visual Cue: Yellow wristband and red socks/Bed in lowest position, wheels locked, appropriate side rails in place/Call bell, personal items and telephone in reach/Instruct patient to call for assistance before getting out of bed or chair/Non-slip footwear when patient is out of bed/[place] to call system/Physically safe environment - no spills, clutter or unnecessary equipment/Purposeful Proactive Rounding/Room/bathroom lighting operational, light cord in reach

## 2023-06-17 NOTE — H&P ADULT - HISTORY OF PRESENT ILLNESS
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 States he has had these symptoms for many years since being diagnosed with GBS and has had multiple rehab stays for physical therapy. Reports going to a medicine clinic in [place] and is currently taking Percocet PRN, gabapentin, Lyrica, and baclofen. He feels like gabapentin does not work for him anymore. Pt was not able to ambulate on his own in the ER and is now being admitted to medicine service.

## 2023-06-17 NOTE — ED ADULT TRIAGE NOTE - CHIEF COMPLAINT QUOTE
Pt BIBEMS c/o lower back pain radiating down b/l legs x1 week, worsened today. Pt states "it feels like spasms." Pt noted to have involuntary twitches, states this is new. Denies any numbness, tingling or recent trauma. PHx HIV, Tashia [place] Syndrome.

## 2023-06-17 NOTE — H&P ADULT - PROBLEM SELECTOR PLAN 1
Reportedly takes Percocet PRN, Lyrica, gabapentin, and Baclofen PRN  -unable to verify meds with pt's pharmacy [facility] as they are closed until Monday- call on Monday to confirm meds  -will resume Lyrica  -will d/c gabapentin per pt request as he does not feel it is helping with his pain  -pt requesting to trial Robaxin instead of baclofen- will start with low dosing 500mg TID  -lidocaine patch daily  -monitor CMP  -Tylenol 975mg q6h PRN for moderate pain  -will hold off on opioids at this time as ISTOP does not show any recent prescriptions for oxycodone  -pt reports anaphylaxis with Toradol, will avoid NSAIDs  -PT eval  -x-ray of lumbosacral spine on 6/15/23 reviewed which was essentially normal

## 2023-06-17 NOTE — H&P ADULT - ASSESSMENT
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

## 2023-06-17 NOTE — ED PROVIDER NOTE - OBJECTIVE STATEMENT
33-year-old male with past medical history of congenital HIV on Fan presents ED complaining of bilateral paraspinal thoracolumbar back pain.  Patient is a complex care patient and is well-known to multiple ED's throughout [place] for the presentation of back pain.  Most recently patient was at [facility] on 6/15 for similar complaint.  Patient endorsing 1 year of intermittent urinary and bowel incontinence but denies saddle anesthesia, urinary retention, constipation, numbness or tingling, weakness, fever, IVDA, trauma.

## 2023-06-17 NOTE — ED PROVIDER NOTE - CLINICAL SUMMARY MEDICAL DECISION MAKING FREE TEXT BOX
Oliver Simental DO PGY-2: 33-year-old male with past medical history of congenital HIV on Fan presents ED complaining of bilateral paraspinal thoracolumbar back pain.  Patient is a complex care patient and is well-known to multiple ED's throughout [place] for the presentation of back pain.  Most recently patient was at [facility] on 6/15 for similar complaint.  Patient endorsing 1 year of intermittent urinary and bowel incontinence but denies saddle anesthesia, urinary retention, constipation, numbness or tingling, weakness, fever, IVDA, trauma. Patient with distractible bilateral thoracolumbar paraspinal tenderness, no midline spinal tenderness, bladder scan with 112 cc in the bladder, otherwise unremarkable neuro exam.  Differential includes not limited to muscle spasm, questionable drug-seeking behavior, low suspicion for cord compression as patient had bowel and bladder incontinence for over a year and has had lumbar MRI which was negative for cord compression at that time.  Plan for pain control and likely discharge.

## 2023-06-17 NOTE — H&P ADULT - MUSCULOSKELETAL
no joint erythema/no joint warmth/no calf tenderness/strength 5/5 bilateral upper extremities/decreased strength details…

## 2023-06-17 NOTE — ED ADULT NURSE NOTE - NSICDXPASTMEDICALHX_GEN_ALL_CORE_FT
PAST MEDICAL HISTORY:  Asthma     Chronic sinusitis     Closed fracture of multiple ribs of right side, initial encounter     Cocaine abuse     GBS (Guillain [place] syndrome)     HIV (human immunodeficiency virus infection) from birth    Homeless

## 2023-06-17 NOTE — PATIENT PROFILE ADULT - SW.
Subjective    Kash El Mccloud is a 10 month old male who presents to clinic today with mom because of:  Fever     HPI   Concerns: fever-cough-no appetite  Cold sx x4 days- just not acting like himself  Cries after Cough and decreased appetite-3.5 oz since 7 am  Seems to Mom like throat is hurting  Does have diaper rash-no vomiting or diarrhea    ====================================================  Cough and rhionrrhea for the last 4 days.  Cough is harsh, and he was breathing fast at night last night.  Fever for 2 days.  Eating less today, and is fussy.  Cries after cough, as though his throat is sore.  Sister was ill with similar symptoms, and he does attend .    He had tylenol 2 hours ago.    He also has a diaper rash that is not responding to barrier diaper rash cream.      Review of Systems  Constitutional, eye, ENT, skin, respiratory, cardiac, and GI are normal except as otherwise noted.    Problem List  Patient Active Problem List    Diagnosis Date Noted     Liveborn infant by vaginal delivery 2019     Priority: Medium      Medications    Current Outpatient Medications on File Prior to Visit:  Cholecalciferol (VITAMIN D PO) Take by mouth daily     No current facility-administered medications on file prior to visit.   Allergies  No Known Allergies  Reviewed and updated as needed this visit by Provider  Tobacco  Allergies  Meds  Problems  Med Hx  Surg Hx  Fam Hx           Objective    Pulse 151   Temp 101.4  F (38.6  C) (Tympanic)   Resp 22   Wt 18 lb 12.5 oz (8.519 kg)   SpO2 100%   21 %ile based on WHO (Boys, 0-2 years) weight-for-age data based on Weight recorded on 9/16/2019.    Physical Exam  GENERAL: Active, alert, in no acute distress.  SKIN: Clear. No significant rash, abnormal pigmentation or lesions except as below  Redness and satellite lesions noted around anus, in the gluteal fold, and in one of the inguinal folds.  Many of the lesions have small white pustules on them.    HEAD: Normocephalic. Normal fontanels and sutures.  EYES:  No discharge or erythema. Normal pupils and EOM  EARS: Normal canals. Tympanic membranes are normal; gray and translucent.  NOSE: Normal without discharge.  MOUTH/THROAT: Clear. No oral lesions.  NECK: Supple, no masses.  LYMPH NODES: No adenopathy  LUNGS: Clear. No rales, rhonchi, wheezing or retractions  HEART: Regular rhythm. Normal S1/S2. No murmurs. Normal femoral pulses.  ABDOMEN: Soft, non-tender, no masses or hepatosplenomegaly.  NEUROLOGIC: Normal tone throughout. Normal reflexes for age    Diagnostics: None      Assessment & Plan    1. Croup  Patient education provided, including expected course of illness and symptoms that may occur which would require urgent evalution.   - dexamethasone (DECADRON) oral solution (inj used orally) 5.1 mg  - ibuprofen (ADVIL/MOTRIN) suspension 40 mg  Continue antipyretics as needed    2. Candidal diaper dermatitis  - BUTT PASTE - REGULAR (DR LOVE POOP GOOP BUTT PASTE FORMULA); Apply topically Diaper Change for skin protection Aquaphor 60 gm, Nystatin 15 gm, Somatohesive paste or powder 28 gm  Dispense: 60 g; Refill: 1  - nystatin (MYCOSTATIN) 175241 UNIT/GM external cream; Apply to affected area tid until rash resolves.  Dispense: 30 g; Refill: 1 - back up in case Nystatin is not covered.    Follow Up  Return in about 3 days (around 9/19/2019) for recheck, if fever not improving.      Ana Evans MD           social work

## 2023-06-17 NOTE — ED PROVIDER NOTE - NS ED ROS FT
GENERAL: No fever, chills  EYES: no vision changes, no discharge.   ENT: no difficulty swallowing or speaking   CARDIAC: no chest pain/pressure, SOB, lower extremity swelling  PULMONARY: no cough, SOB  GI: no abdominal pain, n/v/d  : no dysuria, no hematuria  SKIN: no rashes, no ecchymosis  NEURO: no headache, lightheadedness  MSK: No joint pain, +myalgia, no weakness.

## 2023-06-18 LAB
ALBUMIN SERPL ELPH-MCNC: 3.7 G/DL — SIGNIFICANT CHANGE UP (ref 3.3–5)
ALP SERPL-CCNC: 57 U/L — SIGNIFICANT CHANGE UP (ref 40–120)
ALT FLD-CCNC: 15 U/L — SIGNIFICANT CHANGE UP (ref 4–41)
ANION GAP SERPL CALC-SCNC: 11 MMOL/L — SIGNIFICANT CHANGE UP (ref 7–14)
ANION GAP SERPL CALC-SCNC: 12 MMOL/L — SIGNIFICANT CHANGE UP (ref 7–14)
AST SERPL-CCNC: 22 U/L — SIGNIFICANT CHANGE UP (ref 4–40)
BASOPHILS # BLD AUTO: 0 K/UL — SIGNIFICANT CHANGE UP (ref 0–0.2)
BASOPHILS NFR BLD AUTO: 0 % — SIGNIFICANT CHANGE UP (ref 0–2)
BILIRUB SERPL-MCNC: 0.4 MG/DL — SIGNIFICANT CHANGE UP (ref 0.2–1.2)
BUN SERPL-MCNC: 8 MG/DL — SIGNIFICANT CHANGE UP (ref 7–23)
BUN SERPL-MCNC: 8 MG/DL — SIGNIFICANT CHANGE UP (ref 7–23)
CALCIUM SERPL-MCNC: 8.2 MG/DL — LOW (ref 8.4–10.5)
CALCIUM SERPL-MCNC: 8.3 MG/DL — LOW (ref 8.4–10.5)
CHLORIDE SERPL-SCNC: 103 MMOL/L — SIGNIFICANT CHANGE UP (ref 98–107)
CHLORIDE SERPL-SCNC: 105 MMOL/L — SIGNIFICANT CHANGE UP (ref 98–107)
CO2 SERPL-SCNC: 23 MMOL/L — SIGNIFICANT CHANGE UP (ref 22–31)
CO2 SERPL-SCNC: 24 MMOL/L — SIGNIFICANT CHANGE UP (ref 22–31)
CREAT SERPL-MCNC: 0.67 MG/DL — SIGNIFICANT CHANGE UP (ref 0.5–1.3)
CREAT SERPL-MCNC: 0.7 MG/DL — SIGNIFICANT CHANGE UP (ref 0.5–1.3)
EGFR: 124 ML/MIN/1.73M2 — SIGNIFICANT CHANGE UP
EGFR: 126 ML/MIN/1.73M2 — SIGNIFICANT CHANGE UP
EOSINOPHIL # BLD AUTO: 0.04 K/UL — SIGNIFICANT CHANGE UP (ref 0–0.5)
EOSINOPHIL NFR BLD AUTO: 1.7 % — SIGNIFICANT CHANGE UP (ref 0–6)
GLUCOSE SERPL-MCNC: 106 MG/DL — HIGH (ref 70–99)
GLUCOSE SERPL-MCNC: 90 MG/DL — SIGNIFICANT CHANGE UP (ref 70–99)
HCT VFR BLD CALC: 36 % — LOW (ref 39–50)
HGB BLD-MCNC: 11.6 G/DL — LOW (ref 13–17)
IANC: 0.63 K/UL — LOW (ref 1.8–7.4)
IMM GRANULOCYTES NFR BLD AUTO: 0.4 % — SIGNIFICANT CHANGE UP (ref 0–0.9)
LYMPHOCYTES # BLD AUTO: 1.46 K/UL — SIGNIFICANT CHANGE UP (ref 1–3.3)
LYMPHOCYTES # BLD AUTO: 61.3 % — HIGH (ref 13–44)
MAGNESIUM SERPL-MCNC: 1.4 MG/DL — LOW (ref 1.6–2.6)
MAGNESIUM SERPL-MCNC: 2.1 MG/DL — SIGNIFICANT CHANGE UP (ref 1.6–2.6)
MCHC RBC-ENTMCNC: 28.4 PG — SIGNIFICANT CHANGE UP (ref 27–34)
MCHC RBC-ENTMCNC: 32.2 GM/DL — SIGNIFICANT CHANGE UP (ref 32–36)
MCV RBC AUTO: 88 FL — SIGNIFICANT CHANGE UP (ref 80–100)
MONOCYTES # BLD AUTO: 0.24 K/UL — SIGNIFICANT CHANGE UP (ref 0–0.9)
MONOCYTES NFR BLD AUTO: 10.1 % — SIGNIFICANT CHANGE UP (ref 2–14)
NEUTROPHILS # BLD AUTO: 0.63 K/UL — LOW (ref 1.8–7.4)
NEUTROPHILS NFR BLD AUTO: 26.5 % — LOW (ref 43–77)
NRBC # BLD: 0 /100 WBCS — SIGNIFICANT CHANGE UP (ref 0–0)
NRBC # FLD: 0 K/UL — SIGNIFICANT CHANGE UP (ref 0–0)
PHOSPHATE SERPL-MCNC: 3.2 MG/DL — SIGNIFICANT CHANGE UP (ref 2.5–4.5)
PHOSPHATE SERPL-MCNC: 3.5 MG/DL — SIGNIFICANT CHANGE UP (ref 2.5–4.5)
PLATELET # BLD AUTO: 188 K/UL — SIGNIFICANT CHANGE UP (ref 150–400)
POTASSIUM SERPL-MCNC: 3.1 MMOL/L — LOW (ref 3.5–5.3)
POTASSIUM SERPL-MCNC: 3.7 MMOL/L — SIGNIFICANT CHANGE UP (ref 3.5–5.3)
POTASSIUM SERPL-SCNC: 3.1 MMOL/L — LOW (ref 3.5–5.3)
POTASSIUM SERPL-SCNC: 3.7 MMOL/L — SIGNIFICANT CHANGE UP (ref 3.5–5.3)
PROT SERPL-MCNC: 7.3 G/DL — SIGNIFICANT CHANGE UP (ref 6–8.3)
RBC # BLD: 4.09 M/UL — LOW (ref 4.2–5.8)
RBC # FLD: 12.2 % — SIGNIFICANT CHANGE UP (ref 10.3–14.5)
SODIUM SERPL-SCNC: 138 MMOL/L — SIGNIFICANT CHANGE UP (ref 135–145)
SODIUM SERPL-SCNC: 140 MMOL/L — SIGNIFICANT CHANGE UP (ref 135–145)
WBC # BLD: 2.38 K/UL — LOW (ref 3.8–10.5)
WBC # FLD AUTO: 2.38 K/UL — LOW (ref 3.8–10.5)

## 2023-06-18 PROCEDURE — 99232 SBSQ HOSP IP/OBS MODERATE 35: CPT

## 2023-06-18 RX ORDER — POTASSIUM CHLORIDE 20 MEQ
40 PACKET (EA) ORAL EVERY 4 HOURS
Refills: 0 | Status: COMPLETED | OUTPATIENT
Start: 2023-06-18 | End: 2023-06-18

## 2023-06-18 RX ORDER — MAGNESIUM SULFATE 500 MG/ML
2 VIAL (ML) INJECTION ONCE
Refills: 0 | Status: COMPLETED | OUTPATIENT
Start: 2023-06-18 | End: 2023-06-18

## 2023-06-18 RX ADMIN — BENZOCAINE AND MENTHOL 1 LOZENGE: 5; 1 LIQUID ORAL at 05:26

## 2023-06-18 RX ADMIN — QUETIAPINE FUMARATE 100 MILLIGRAM(S): 200 TABLET, FILM COATED ORAL at 11:44

## 2023-06-18 RX ADMIN — Medication 25 GRAM(S): at 09:27

## 2023-06-18 RX ADMIN — Medication 50 MILLIGRAM(S): at 22:20

## 2023-06-18 RX ADMIN — Medication 975 MILLIGRAM(S): at 12:45

## 2023-06-18 RX ADMIN — Medication 40 MILLIEQUIVALENT(S): at 09:27

## 2023-06-18 RX ADMIN — Medication 40 MILLIEQUIVALENT(S): at 13:55

## 2023-06-18 RX ADMIN — Medication 50 MILLIGRAM(S): at 05:26

## 2023-06-18 RX ADMIN — Medication 975 MILLIGRAM(S): at 11:43

## 2023-06-18 RX ADMIN — Medication 3 MILLIGRAM(S): at 22:27

## 2023-06-18 RX ADMIN — QUETIAPINE FUMARATE 300 MILLIGRAM(S): 200 TABLET, FILM COATED ORAL at 22:21

## 2023-06-18 RX ADMIN — METHOCARBAMOL 500 MILLIGRAM(S): 500 TABLET, FILM COATED ORAL at 05:26

## 2023-06-18 RX ADMIN — METHOCARBAMOL 500 MILLIGRAM(S): 500 TABLET, FILM COATED ORAL at 13:56

## 2023-06-18 RX ADMIN — Medication 50 MILLIGRAM(S): at 13:55

## 2023-06-18 RX ADMIN — METHOCARBAMOL 500 MILLIGRAM(S): 500 TABLET, FILM COATED ORAL at 22:20

## 2023-06-18 RX ADMIN — BICTEGRAVIR SODIUM, EMTRICITABINE, AND TENOFOVIR ALAFENAMIDE FUMARATE 1 TABLET(S): 30; 120; 15 TABLET ORAL at 11:44

## 2023-06-18 NOTE — PROGRESS NOTE ADULT - SUBJECTIVE AND OBJECTIVE BOX
Patient is a 33y old  Male who presents with a chief complaint of Back pain and lower leg weakness (17 Jun 2023 19:01)      INTERVAL HPI/OVERNIGHT EVENTS: DERICK overnight. This am, pt c/o pain in throat and back.       REVIEW OF SYSTEMS:    CONSTITUTIONAL: No weakness, fevers or chills  EYES/ENT: No visual changes;  No vertigo, + throat pain   NECK: No pain or stiffness  RESPIRATORY: No cough, wheezing, hemoptysis; No shortness of breath  CARDIOVASCULAR: No chest pain or palpitations  GASTROINTESTINAL: No abdominal or epigastric pain. No nausea, vomiting, or hematemesis; No diarrhea or constipation. No melena or hematochezia.  GENITOURINARY: No dysuria, frequency or hematuria  NEUROLOGICAL: No numbness or weakness  MSK: + back pain  All other review of systems is negative unless indicated above.    FAMILY HISTORY:  FH: HIV infection (Mother)      T(C): 36.6 (06-18-23 @ 05:20), Max: 37.1 (06-17-23 @ 14:54)  HR: 60 (06-18-23 @ 05:20) (60 - 83)  BP: 112/60 (06-18-23 @ 05:20) (112/60 - 133/94)  RR: 18 (06-18-23 @ 05:20) (18 - 18)  SpO2: 100% (06-18-23 @ 05:20) (100% - 100%)  Wt(kg): --Vital Signs Last 24 Hrs  T(C): 36.6 (18 Jun 2023 05:20), Max: 37.1 (17 Jun 2023 14:54)  T(F): 97.9 (18 Jun 2023 05:20), Max: 98.8 (17 Jun 2023 14:54)  HR: 60 (18 Jun 2023 05:20) (60 - 83)  BP: 112/60 (18 Jun 2023 05:20) (112/60 - 133/94)  BP(mean): --  RR: 18 (18 Jun 2023 05:20) (18 - 18)  SpO2: 100% (18 Jun 2023 05:20) (100% - 100%)    Parameters below as of 18 Jun 2023 05:20  Patient On (Oxygen Delivery Method): room air        PHYSICAL EXAM:  GENERAL: NAD, well-groomed, well-developed  HEAD:  Atraumatic, Normocephalic  EYES: EOMI, PERRLA, conjunctiva and sclera clear  ENMT: No tonsillar erythema, exudates, or enlargement; Moist mucous membranes, Good dentition, No lesions  NECK: Supple, No JVD, Normal thyroid  NERVOUS SYSTEM:  Alert & Oriented X3, Good concentration; Motor Strength 3/5 b/l LEs  CHEST/LUNG: Clear to percussion bilaterally; No rales, rhonchi, wheezing, or rubs  HEART: Regular rate and rhythm; No murmurs, rubs, or gallops  ABDOMEN: Soft, Nontender, Nondistended; Bowel sounds present  EXTREMITIES:  2+ Peripheral Pulses, No clubbing, cyanosis, or edema  LYMPH: No lymphadenopathy noted  SKIN: No rashes or lesions    Consultant(s) Notes Reviewed:  [x ] YES  [ ] NO  Care Discussed with Consultants/Other Providers [ x] YES  [ ] NO    LABS:                        11.6   2.38  )-----------( 188      ( 18 Jun 2023 06:30 )             36.0     18 Jun 2023 06:30    138    |  103    |  8      ----------------------------<  90     3.1     |  23     |  0.67     Ca    8.2        18 Jun 2023 06:30  Phos  3.2       18 Jun 2023 06:30  Mg     1.40      18 Jun 2023 06:30    TPro  7.3    /  Alb  3.7    /  TBili  0.4    /  DBili  x      /  AST  22     /  ALT  15     /  AlkPhos  57     18 Jun 2023 06:30      CAPILLARY BLOOD GLUCOSE        BLOOD CULTURE      RADIOLOGY & ADDITIONAL TESTS:    Imaging Personally Reviewed:  [ ] YES  [ ] NO  acetaminophen     Tablet .. 975 milliGRAM(s) Oral every 6 hours PRN  benzocaine/menthol Lozenge 1 Lozenge Oral every 3 hours PRN  bictegravir 50 mG/emtricitabine 200 mG/tenofovir alafenamide 25 mG (BIKTARVY) 1 Tablet(s) Oral daily  enoxaparin Injectable 40 milliGRAM(s) SubCutaneous every 24 hours  lidocaine   4% Patch 1 Patch Transdermal daily  melatonin 3 milliGRAM(s) Oral at bedtime PRN  methocarbamol 500 milliGRAM(s) Oral three times a day  nicotine -  14 mG/24Hr(s) Patch 1 Patch Transdermal daily  potassium chloride    Tablet ER 40 milliEquivalent(s) Oral every 4 hours  pregabalin 50 milliGRAM(s) Oral three times a day  QUEtiapine 100 milliGRAM(s) Oral daily  QUEtiapine 300 milliGRAM(s) Oral at bedtime  trimethoprim  160 mG/sulfamethoxazole 800 mG 1 Tablet(s) Oral <User Schedule>      HEALTH ISSUES - PROBLEM Dx:  Chronic back pain    Leg weakness    HIV disease    Need for prophylactic measure

## 2023-06-18 NOTE — PHYSICAL THERAPY INITIAL EVALUATION ADULT - ACTIVE RANGE OF MOTION EXAMINATION, REHAB EVAL
left LE: minimum movement/bilateral upper extremity Active ROM was WFL (within functional limits)/Right LE Active ROM was WFL (within functional limits)

## 2023-06-18 NOTE — PROGRESS NOTE ADULT - PROBLEM SELECTOR PLAN 1
Reportedly takes Percocet PRN, Lyrica, gabapentin, and Baclofen PRN  -unable to verify meds with pt's pharmacy [facility] as they are closed until Monday- call on Monday to confirm meds  -c/w Lyrica  -will d/c gabapentin per pt request as he does not feel it is helping with his pain  -pt requesting to trial Robaxin instead of baclofen- will start with low dosing 500mg TID  -lidocaine patch daily  -monitor CMP  -Tylenol 975mg q6h PRN for moderate pain  -will hold off on opioids at this time as ISTOP does not show any recent prescriptions for oxycodone  -pt reports anaphylaxis with Toradol, will avoid NSAIDs  -PT eval  -x-ray of lumbosacral spine on 6/15/23 reviewed which was essentially normal  - MR T/L-spine 9/2/2022: No cord or cauda equina compression. No abnormal cord signal or enhancement  - Can consider repeat MRI  -Chronic pain consult

## 2023-06-18 NOTE — PROGRESS NOTE ADULT - PROBLEM SELECTOR PLAN 2
Chronic per patient since being diagnosed with GBS  -PT eval  -low suspicion for acute neurologic process/cord compression  -Neuro eval, call Monday

## 2023-06-18 NOTE — PROGRESS NOTE ADULT - PROBLEM SELECTOR PLAN 3
C/w Biktarvy  -check HIV viral load and CD4 count  -CD4 count in December 2022 was 145, viral load still detectable in January 2023  -On Bactrim

## 2023-06-18 NOTE — PHYSICAL THERAPY INITIAL EVALUATION ADULT - PERTINENT HX OF CURRENT PROBLEM, REHAB EVAL
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

## 2023-06-18 NOTE — PHYSICAL THERAPY INITIAL EVALUATION ADULT - NSPTDMEREC_GEN_A_CORE
rolling walker Z Plasty Text: The lesion was extirpated to the level of the fat with a #15c scalpel blade.  Given the location of the defect, shape of the defect and the proximity to free margins a Z-plasty was deemed most appropriate for repair.  Using a sterile surgical marker, the appropriate transposition arms of the Z-plasty were drawn incorporating the defect and placing the expected incisions within the relaxed skin tension lines where possible.    The area thus outlined was incised deep to adipose tissue with a #15 scalpel blade.  The skin margins were undermined to an appropriate distance in all directions utilizing iris scissors.  The opposing transposition arms were then transposed into place in opposite direction and anchored with interrupted buried subcutaneous sutures.

## 2023-06-18 NOTE — PHYSICAL THERAPY INITIAL EVALUATION ADULT - GAIT DEVIATIONS NOTED, PT EVAL
decreased velocity of limb motion/decreased step length/decreased stride length/increased stride width/decreased swing-to-stance ratio

## 2023-06-19 DIAGNOSIS — Z79.899 OTHER LONG TERM (CURRENT) DRUG THERAPY: ICD-10-CM

## 2023-06-19 LAB
4/8 RATIO: 0.16 RATIO — LOW (ref 0.9–3.6)
ABS CD8: 997 CELLS/UL — HIGH (ref 142–740)
ANION GAP SERPL CALC-SCNC: 12 MMOL/L — SIGNIFICANT CHANGE UP (ref 7–14)
BUN SERPL-MCNC: 7 MG/DL — SIGNIFICANT CHANGE UP (ref 7–23)
CALCIUM SERPL-MCNC: 8.3 MG/DL — LOW (ref 8.4–10.5)
CD3 BLASTS SPEC-ACNC: 1213 CELLS/UL — SIGNIFICANT CHANGE UP (ref 672–1870)
CD3 BLASTS SPEC-ACNC: 87 % — HIGH (ref 59–83)
CD4 %: 11 % — LOW (ref 30–62)
CD8 %: 71 % — HIGH (ref 12–36)
CHLORIDE SERPL-SCNC: 104 MMOL/L — SIGNIFICANT CHANGE UP (ref 98–107)
CO2 SERPL-SCNC: 21 MMOL/L — LOW (ref 22–31)
CREAT SERPL-MCNC: 0.74 MG/DL — SIGNIFICANT CHANGE UP (ref 0.5–1.3)
EGFR: 122 ML/MIN/1.73M2 — SIGNIFICANT CHANGE UP
GLUCOSE SERPL-MCNC: 102 MG/DL — HIGH (ref 70–99)
HCT VFR BLD CALC: 36.9 % — LOW (ref 39–50)
HGB BLD-MCNC: 11.9 G/DL — LOW (ref 13–17)
HIV-1 VIRAL LOAD RESULT: ABNORMAL
HIV1 RNA # SERPL NAA+PROBE: SIGNIFICANT CHANGE UP
HIV1 RNA SER-IMP: SIGNIFICANT CHANGE UP
HIV1 RNA SERPL NAA+PROBE-ACNC: ABNORMAL
HIV1 RNA SERPL NAA+PROBE-LOG#: 5.89 — SIGNIFICANT CHANGE UP
MAGNESIUM SERPL-MCNC: 1.6 MG/DL — SIGNIFICANT CHANGE UP (ref 1.6–2.6)
MCHC RBC-ENTMCNC: 28.7 PG — SIGNIFICANT CHANGE UP (ref 27–34)
MCHC RBC-ENTMCNC: 32.2 GM/DL — SIGNIFICANT CHANGE UP (ref 32–36)
MCV RBC AUTO: 88.9 FL — SIGNIFICANT CHANGE UP (ref 80–100)
NRBC # BLD: 0 /100 WBCS — SIGNIFICANT CHANGE UP (ref 0–0)
NRBC # FLD: 0 K/UL — SIGNIFICANT CHANGE UP (ref 0–0)
PHOSPHATE SERPL-MCNC: 3 MG/DL — SIGNIFICANT CHANGE UP (ref 2.5–4.5)
PLATELET # BLD AUTO: 162 K/UL — SIGNIFICANT CHANGE UP (ref 150–400)
POTASSIUM SERPL-MCNC: 3.7 MMOL/L — SIGNIFICANT CHANGE UP (ref 3.5–5.3)
POTASSIUM SERPL-SCNC: 3.7 MMOL/L — SIGNIFICANT CHANGE UP (ref 3.5–5.3)
RBC # BLD: 4.15 M/UL — LOW (ref 4.2–5.8)
RBC # FLD: 12.6 % — SIGNIFICANT CHANGE UP (ref 10.3–14.5)
SODIUM SERPL-SCNC: 137 MMOL/L — SIGNIFICANT CHANGE UP (ref 135–145)
T-CELL CD4 SUBSET PNL BLD: 157 CELLS/UL — LOW (ref 489–1457)
WBC # BLD: 2.36 K/UL — LOW (ref 3.8–10.5)
WBC # FLD AUTO: 2.36 K/UL — LOW (ref 3.8–10.5)

## 2023-06-19 PROCEDURE — 99232 SBSQ HOSP IP/OBS MODERATE 35: CPT

## 2023-06-19 PROCEDURE — 99222 1ST HOSP IP/OBS MODERATE 55: CPT

## 2023-06-19 RX ORDER — METHOCARBAMOL 500 MG/1
750 TABLET, FILM COATED ORAL EVERY 8 HOURS
Refills: 0 | Status: DISCONTINUED | OUTPATIENT
Start: 2023-06-19 | End: 2023-06-24

## 2023-06-19 RX ORDER — ACETAMINOPHEN 500 MG
975 TABLET ORAL EVERY 6 HOURS
Refills: 0 | Status: COMPLETED | OUTPATIENT
Start: 2023-06-19 | End: 2023-06-21

## 2023-06-19 RX ORDER — METHOCARBAMOL 500 MG/1
750 TABLET, FILM COATED ORAL EVERY 8 HOURS
Refills: 0 | Status: DISCONTINUED | OUTPATIENT
Start: 2023-06-19 | End: 2023-06-19

## 2023-06-19 RX ADMIN — QUETIAPINE FUMARATE 300 MILLIGRAM(S): 200 TABLET, FILM COATED ORAL at 21:49

## 2023-06-19 RX ADMIN — Medication 1 TABLET(S): at 05:35

## 2023-06-19 RX ADMIN — Medication 975 MILLIGRAM(S): at 15:54

## 2023-06-19 RX ADMIN — Medication 975 MILLIGRAM(S): at 22:19

## 2023-06-19 RX ADMIN — Medication 975 MILLIGRAM(S): at 16:54

## 2023-06-19 RX ADMIN — Medication 3 MILLIGRAM(S): at 21:55

## 2023-06-19 RX ADMIN — Medication 50 MILLIGRAM(S): at 05:35

## 2023-06-19 RX ADMIN — Medication 975 MILLIGRAM(S): at 21:49

## 2023-06-19 RX ADMIN — METHOCARBAMOL 750 MILLIGRAM(S): 500 TABLET, FILM COATED ORAL at 21:49

## 2023-06-19 RX ADMIN — BENZOCAINE AND MENTHOL 1 LOZENGE: 5; 1 LIQUID ORAL at 15:55

## 2023-06-19 RX ADMIN — Medication 75 MILLIGRAM(S): at 12:43

## 2023-06-19 RX ADMIN — BICTEGRAVIR SODIUM, EMTRICITABINE, AND TENOFOVIR ALAFENAMIDE FUMARATE 1 TABLET(S): 30; 120; 15 TABLET ORAL at 12:40

## 2023-06-19 RX ADMIN — QUETIAPINE FUMARATE 100 MILLIGRAM(S): 200 TABLET, FILM COATED ORAL at 12:39

## 2023-06-19 RX ADMIN — METHOCARBAMOL 500 MILLIGRAM(S): 500 TABLET, FILM COATED ORAL at 12:40

## 2023-06-19 RX ADMIN — METHOCARBAMOL 500 MILLIGRAM(S): 500 TABLET, FILM COATED ORAL at 05:36

## 2023-06-19 RX ADMIN — Medication 75 MILLIGRAM(S): at 21:49

## 2023-06-19 NOTE — CONSULT NOTE ADULT - ATTENDING COMMENTS
Reflexes 3 throughout and B upgoing toes. Spasticity in legs>arms.    Vitamin B12, Serum: 238 pg/mL (08.09.22 @ 16:00)   Vitamin B12, Serum: 405 pg/mL (12.08.22 @ 06:26)   HIV-1 RNA Quantitative, Viral Load: 775,439 (06.18.23 @ 06:30)   ABS CD4: 157 cells/uL (06.19.23 @ 07:58)     A/P  Mr. Garsia is a 34 yo man with a progressive myelopathy for at least 5 year.  He has had extensive work up in the past - no compressive myelopathy, no demyelinating disease, no other cause found on labs tests.   Given that he has a h/o congenital HIV and that he has not taken HIV medications consistently - AIDS myelopathy is possible.  Aggressive treatment of HIV.  Vitamin B12 supplementation 1000 mg Daily - not likely contributing but want to keep B 12 levels above 400.   PT/OT  Social work  Pain management  I counselled the patient regarding importance of treating HIV And B12 deficiency.   Thank you . Reflexes 3 throughout and B upgoing toes. Spasticity in legs>arms.    Vitamin B12, Serum: 238 pg/mL (08.09.22 @ 16:00)   Vitamin B12, Serum: 405 pg/mL (12.08.22 @ 06:26)   HIV-1 RNA Quantitative, Viral Load: 775,439 (06.18.23 @ 06:30)   ABS CD4: 157 cells/uL (06.19.23 @ 07:58)     A/P  Mr. Garsia is a 34 yo man with a progressive myelopathy for at least 5 year.  He has had extensive work up in the past - no compressive myelopathy, no demyelinating disease, no other cause found on labs tests.   Given that he has a h/o congenital HIV and that he has not taken HIV medications consistently - AIDS myelopathy is possible.  Aggressive treatment of HIV.  Vitamin B12 supplementation 1000 mg Daily - not likely contributing but want to keep B 12 levels above 400.   PT/OT  Social work  Pain management  I counselled the patient regarding importance of treating HIV And B12 deficiency.   Neurology signing off. Please reconsult PRN or call [facility] with any questions.  Thank you .

## 2023-06-19 NOTE — CONSULT NOTE ADULT - SUBJECTIVE AND OBJECTIVE BOX
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 States he has had these symptoms for many years since being diagnosed with GBS and has had multiple rehab stays for physical therapy. Reports going to a medicine clinic in [place] and is currently taking Percocet PRN, gabapentin, Lyrica, and baclofen. He feels like gabapentin does not work for him anymore. Pt was not able to ambulate on his own in the ER and is now being admitted to medicine service. (17 Jun 2023 19:01)      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  Asthma  Closed fracture of multiple ribs of right side, initial encounter  Cocaine abuse  Chronic sinusitis  Homeless  GBS (Guillain [place] syndrome)  History of orthopedic surgery  left arm      FAMILY HISTORY:  FH: HIV infection (Mother)    SOCIAL HISTORY:  [ x] Denies Smoking, Alcohol, or Drug Use. Daily marijuana use    Allergies    Toradol (Swelling)  Ceclor (Unknown)  Toradol (Anaphylaxis; Swelling)      PAIN MEDICATIONS:  acetaminophen     Tablet .. 975 milliGRAM(s) Oral every 6 hours PRN  melatonin 3 milliGRAM(s) Oral at bedtime PRN  methocarbamol 500 milliGRAM(s) Oral three times a day  pregabalin 75 milliGRAM(s) Oral three times a day  QUEtiapine 100 milliGRAM(s) Oral daily  QUEtiapine 300 milliGRAM(s) Oral at bedtime    Heme:  enoxaparin Injectable 40 milliGRAM(s) SubCutaneous every 24 hours    Antibiotics:  bictegravir 50 mG/emtricitabine 200 mG/tenofovir alafenamide 25 mG (BIKTARVY) 1 Tablet(s) Oral daily  trimethoprim  160 mG/sulfamethoxazole 800 mG 1 Tablet(s) Oral <User Schedule>    All Other Medications:  benzocaine/menthol Lozenge 1 Lozenge Oral every 3 hours PRN  lidocaine   4% Patch 1 Patch Transdermal daily  nicotine -  14 mG/24Hr(s) Patch 1 Patch Transdermal daily      Vital Signs Last 24 Hrs  T(C): 36.4 (19 Jun 2023 12:51), Max: 36.9 (19 Jun 2023 07:02)  T(F): 97.6 (19 Jun 2023 12:51), Max: 98.4 (19 Jun 2023 07:02)  HR: 81 (19 Jun 2023 12:51) (71 - 81)  BP: 116/65 (19 Jun 2023 12:51) (108/65 - 116/76)  BP(mean): --  RR: 18 (19 Jun 2023 12:51) (17 - 18)  SpO2: 100% (19 Jun 2023 12:51) (98% - 100%)    Parameters below as of 19 Jun 2023 12:51  Patient On (Oxygen Delivery Method): room air        PAIN SCORE:   5/10      SCALE USED: (1-10 VNRS)           LABS:                          11.9   2.36  )-----------( 162      ( 19 Jun 2023 06:46 )             36.9     06-19    137  |  104  |  7   ----------------------------<  102<H>  3.7   |  21<L>  |  0.74    Ca    8.3<L>      19 Jun 2023 06:46  Phos  3.0     06-19  Mg     1.60     06-19    TPro  7.3  /  Alb  3.7  /  TBili  0.4  /  DBili  x   /  AST  22  /  ALT  15  /  AlkPhos  57  06-18        [ x]  [place] Reference #[id number]  Oxycodone-acetaminophen 5-325mg tablet. Quantity 6 tablets for 2 days. Last dispensed 04/25/2023  Oxycodone 5mg tablet. Quantity 20 tablets for 7 days. Last dispensed 10/27/2022  Pregabalin 50mg capsule. Quantity 90 tablets for 30 days. Last dispensed 08/16/2022    Summary:  Patient seen with complaints of 5/10 lower back pain that radiates to the toes of bilateral lower extremities. He describes the pain as constant and throbbing from the back to LE with LLE > RLE, sharp and burning in the feet. The patient noticed increased weakness of his legs about 2 weeks ago while walking and had been “dragging” his legs to move which is new since 2018. He states he always has numbness in his legs. He had developed GBS in 2017 after an influenza vaccination from his Infectious Disease doctor in [place] where he became paralyzed 4 hours later. Patient was able to walk again 1 year later. He sees his PCP Dr. Crowley who prescribes him Gabapentin 300 TID, Lyrica BID, Baclofen 10mg, and recently started on Methocarbamol 500mg BID. He states the medications don’t help and he has eventually learned to manage the pain through tolerance. Patient does not see outpatient Neurologist or pain management provider. He endorses daily marijuana use which does manage pain to an extent. Denies smoking, alcohol and illicit drug use. Endorses history of cocaine abuse and last used a few years ago.    PHYSICAL EXAM:  GENERAL: Alert & Oriented x 3 in NAD. Maintains eye contact, answers questions appropriately.     Recommendations:  -  Consider discontinuing current orders for Tylenol and change to standing x 2 days, the PRN. Not to be given within 6 hours of last dose of Acetaminophen.  -  Consider up-titrating dose for Lyrica to 75mg Q8H. Hold for sedation.  -  Consider up-titrating dose for Methocarbamol to 750mg Q8H. Hold for sedation.  -  Consider ordering Urine Toxicology.  -  Consider ordering    -  Recommend maintaining continuous pulse oximetry.  -  Recommend Physical Therapy consult for TENS therapy and strengthening exercises.  -  Recommend Neurology consult for lower extremity weakness and paresthesia.  -  Recommend follow up with Neuro, PCP and/or Chronic Pain doctor when discharged. If patient does not have a Chronic Pain doctor, may acquire one through patient's personal insurance carrier.    Will notify Chronic Pain attending on call, Dr. Pena. Awaiting call back.  No further recommendations at this time, Chronic pain service to sign off. May call Chronic Pain Service if needed.   Thank you.     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 States he has had these symptoms for many years since being diagnosed with GBS and has had multiple rehab stays for physical therapy. Reports going to a medicine clinic in [place] and is currently taking Percocet PRN, gabapentin, Lyrica, and baclofen. He feels like gabapentin does not work for him anymore. Pt was not able to ambulate on his own in the ER and is now being admitted to medicine service. (17 Jun 2023 19:01)      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  Asthma  Closed fracture of multiple ribs of right side, initial encounter  Cocaine abuse  Chronic sinusitis  Homeless  GBS (Guillain [place] syndrome)  History of orthopedic surgery  left arm      FAMILY HISTORY:  FH: HIV infection (Mother)    SOCIAL HISTORY:  [ x] Denies Smoking, Alcohol, or Drug Use. Daily marijuana use    Allergies    Toradol (Swelling)  Ceclor (Unknown)  Toradol (Anaphylaxis; Swelling)      PAIN MEDICATIONS:  acetaminophen     Tablet .. 975 milliGRAM(s) Oral every 6 hours PRN  melatonin 3 milliGRAM(s) Oral at bedtime PRN  methocarbamol 500 milliGRAM(s) Oral three times a day  pregabalin 75 milliGRAM(s) Oral three times a day  QUEtiapine 100 milliGRAM(s) Oral daily  QUEtiapine 300 milliGRAM(s) Oral at bedtime    Heme:  enoxaparin Injectable 40 milliGRAM(s) SubCutaneous every 24 hours    Antibiotics:  bictegravir 50 mG/emtricitabine 200 mG/tenofovir alafenamide 25 mG (BIKTARVY) 1 Tablet(s) Oral daily  trimethoprim  160 mG/sulfamethoxazole 800 mG 1 Tablet(s) Oral <User Schedule>    All Other Medications:  benzocaine/menthol Lozenge 1 Lozenge Oral every 3 hours PRN  lidocaine   4% Patch 1 Patch Transdermal daily  nicotine -  14 mG/24Hr(s) Patch 1 Patch Transdermal daily      Vital Signs Last 24 Hrs  T(C): 36.4 (19 Jun 2023 12:51), Max: 36.9 (19 Jun 2023 07:02)  T(F): 97.6 (19 Jun 2023 12:51), Max: 98.4 (19 Jun 2023 07:02)  HR: 81 (19 Jun 2023 12:51) (71 - 81)  BP: 116/65 (19 Jun 2023 12:51) (108/65 - 116/76)  BP(mean): --  RR: 18 (19 Jun 2023 12:51) (17 - 18)  SpO2: 100% (19 Jun 2023 12:51) (98% - 100%)    Parameters below as of 19 Jun 2023 12:51  Patient On (Oxygen Delivery Method): room air        PAIN SCORE:   5/10      SCALE USED: (1-10 VNRS)           LABS:                          11.9   2.36  )-----------( 162      ( 19 Jun 2023 06:46 )             36.9     06-19    137  |  104  |  7   ----------------------------<  102<H>  3.7   |  21<L>  |  0.74    Ca    8.3<L>      19 Jun 2023 06:46  Phos  3.0     06-19  Mg     1.60     06-19    TPro  7.3  /  Alb  3.7  /  TBili  0.4  /  DBili  x   /  AST  22  /  ALT  15  /  AlkPhos  57  06-18        [ x]  [place] Reference #[id number]  Oxycodone-acetaminophen 5-325mg tablet. Quantity 6 tablets for 2 days. Last dispensed 04/25/2023  Oxycodone 5mg tablet. Quantity 20 tablets for 7 days. Last dispensed 10/27/2022  Pregabalin 50mg capsule. Quantity 90 tablets for 30 days. Last dispensed 08/16/2022    Summary:  Patient seen with complaints of 5/10 lower back pain that radiates to the toes of bilateral lower extremities. He describes the pain as constant and throbbing from the back to LE with LLE > RLE, sharp and burning in the feet. The patient noticed increased weakness of his legs about 2 weeks ago while walking and had been “dragging” his legs to move which is new since 2018. He states he always has numbness in his legs. He had developed GBS in 2017 after an influenza vaccination from his Infectious Disease doctor in [place] where he became paralyzed 4 hours later. Patient was able to walk again 1 year later. He sees his PCP Dr. Crowley who prescribes him Gabapentin 300 TID, Lyrica BID, Baclofen 10mg, and recently started on Methocarbamol 500mg BID. He states the medications don’t help and he has eventually learned to manage the pain through tolerance. Patient does not see outpatient Neurologist or pain management provider. He endorses daily marijuana use which does manage pain to an extent. Denies smoking, alcohol and illicit drug use. Endorses history of cocaine abuse and last used a few years ago.    PHYSICAL EXAM:  GENERAL: Alert & Oriented x 3 in NAD. Maintains eye contact, answers questions appropriately.     Recommendations:  -  Consider discontinuing current orders for Tylenol and change to standing x 2 days, the PRN. Not to be given within 6 hours of last dose of Acetaminophen.  -  Consider up-titrating dose for Lyrica to 75mg Q8H. Hold for sedation.  -  Consider up-titrating dose for Methocarbamol to 750mg Q8H. Hold for sedation.  No opioids recommended at this time as it does not treat neuropathic pain.  -  Consider ordering Urine Toxicology.  -  Recommend maintaining continuous pulse oximetry.  -  Recommend Physical Therapy consult for TENS therapy and strengthening exercises.  -  Recommend Neurology consult for lower extremity weakness and paresthesia.  -  Recommend follow up with Neuro, PCP and/or Chronic Pain doctor when discharged. If patient does not have a Chronic Pain doctor, may acquire one through patient's personal insurance carrier.  Discussed patient with on call Chronic Pain Attending Dr Pena who agrees with above recommendations.  No further recommendations at this time, Chronic pain service to sign off. May call Chronic Pain Service if needed.   Thank you.     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 States he has had these symptoms for many years since being diagnosed with GBS and has had multiple rehab stays for physical therapy. Reports going to a medicine clinic in [place] and is currently taking Percocet PRN, gabapentin, Lyrica, and baclofen. He feels like gabapentin does not work for him anymore. Pt was not able to ambulate on his own in the ER and is now being admitted to medicine service. (17 Jun 2023 19:01)      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  Asthma  Closed fracture of multiple ribs of right side, initial encounter  Cocaine abuse  Chronic sinusitis  Homeless  GBS (Guillain [place] syndrome)  History of orthopedic surgery  left arm      FAMILY HISTORY:  FH: HIV infection (Mother)    SOCIAL HISTORY:  [ x] Denies Smoking, Alcohol, or Drug Use. Daily marijuana use    Allergies    Toradol (Swelling)  Ceclor (Unknown)  Toradol (Anaphylaxis; Swelling)      PAIN MEDICATIONS:  acetaminophen     Tablet .. 975 milliGRAM(s) Oral every 6 hours PRN  melatonin 3 milliGRAM(s) Oral at bedtime PRN  methocarbamol 500 milliGRAM(s) Oral three times a day  pregabalin 75 milliGRAM(s) Oral three times a day  QUEtiapine 100 milliGRAM(s) Oral daily  QUEtiapine 300 milliGRAM(s) Oral at bedtime    Heme:  enoxaparin Injectable 40 milliGRAM(s) SubCutaneous every 24 hours    Antibiotics:  bictegravir 50 mG/emtricitabine 200 mG/tenofovir alafenamide 25 mG (BIKTARVY) 1 Tablet(s) Oral daily  trimethoprim  160 mG/sulfamethoxazole 800 mG 1 Tablet(s) Oral <User Schedule>    All Other Medications:  benzocaine/menthol Lozenge 1 Lozenge Oral every 3 hours PRN  lidocaine   4% Patch 1 Patch Transdermal daily  nicotine -  14 mG/24Hr(s) Patch 1 Patch Transdermal daily      Vital Signs Last 24 Hrs  T(C): 36.4 (19 Jun 2023 12:51), Max: 36.9 (19 Jun 2023 07:02)  T(F): 97.6 (19 Jun 2023 12:51), Max: 98.4 (19 Jun 2023 07:02)  HR: 81 (19 Jun 2023 12:51) (71 - 81)  BP: 116/65 (19 Jun 2023 12:51) (108/65 - 116/76)  BP(mean): --  RR: 18 (19 Jun 2023 12:51) (17 - 18)  SpO2: 100% (19 Jun 2023 12:51) (98% - 100%)    Parameters below as of 19 Jun 2023 12:51  Patient On (Oxygen Delivery Method): room air        PAIN SCORE:   5/10      SCALE USED: (1-10 VNRS)           LABS:                          11.9   2.36  )-----------( 162      ( 19 Jun 2023 06:46 )             36.9     06-19    137  |  104  |  7   ----------------------------<  102<H>  3.7   |  21<L>  |  0.74    Ca    8.3<L>      19 Jun 2023 06:46  Phos  3.0     06-19  Mg     1.60     06-19    TPro  7.3  /  Alb  3.7  /  TBili  0.4  /  DBili  x   /  AST  22  /  ALT  15  /  AlkPhos  57  06-18        [ x]  [place] Reference #[id number]  Oxycodone-acetaminophen 5-325mg tablet. Quantity 6 tablets for 2 days. Last dispensed 04/25/2023  Oxycodone 5mg tablet. Quantity 20 tablets for 7 days. Last dispensed 10/27/2022  Pregabalin 50mg capsule. Quantity 90 tablets for 30 days. Last dispensed 08/16/2022    Summary:  Patient seen with complaints of 5/10 lower back pain that radiates to the toes of bilateral lower extremities. He describes the pain as constant and throbbing from the back to LE with LLE > RLE, sharp and burning in the feet. The patient noticed increased weakness of his legs about 2 weeks ago while walking and had been “dragging” his legs to move which is new since 2018. He states he always has numbness in his legs. He had developed GBS in 2017 after an influenza vaccination from his Infectious Disease doctor in [place] where he became paralyzed 4 hours later. Patient was able to walk again 1 year later. He sees his PCP Dr. Crowley who prescribes him Gabapentin 300 TID, Lyrica BID, Baclofen 10mg, and recently started on Methocarbamol 500mg BID. He states the medications don’t help and he has eventually learned to manage the pain through tolerance. Patient does not see outpatient Neurologist or pain management provider. He endorses daily marijuana use which does manage pain to an extent. Denies smoking, alcohol and illicit drug use. Endorses history of cocaine abuse and last used a few years ago.    PHYSICAL EXAM:  GENERAL: Alert & Oriented x 3 in NAD. Maintains eye contact, answers questions appropriately.     Recommendations:  -  Consider discontinuing current orders for Tylenol and change to standing x 2 days, the PRN. Not to be given within 6 hours of last dose of Acetaminophen.  -  Consider up-titrating dose for Lyrica to 75mg Q8H. Hold for sedation.  -  Consider up-titrating dose for Methocarbamol to 750mg Q8H. Hold for sedation.  No opioids recommended at this time as it does not treat neuropathic pain.  -  Consider ordering Urine Toxicology.  -  Recommend maintaining continuous pulse oximetry.  -  Recommend Physical Therapy consult for TENS therapy and strengthening exercises.  -  Recommend SW for assistance with living situation, medical follow-up.  -  Recommend Neurology consult for lower extremity weakness and paresthesia.  -  Recommend follow up with Neuro, PCP and/or Chronic Pain doctor when discharged. If patient does not have a Chronic Pain doctor, may acquire one through patient's personal insurance carrier.  Discussed patient with on call Chronic Pain Attending Dr Pena who agrees with above recommendations.  No further recommendations at this time, Chronic pain service to sign off. May call Chronic Pain Service if needed.   Thank you.

## 2023-06-19 NOTE — CONSULT NOTE ADULT - ASSESSMENT
33M with congential HIV on Biktarvy following currently in [facility], GBS s/p influenza vaccination, chronic lower back pain and LE weakness presenting with worsening back pain and weakness. Has multiple hospital visits for similar symptoms. Last MRI was in 9/2022 with no cord or cauda equina compression. No abnormal cord signal or enhancement. No fevers. Leukopenia. CD4 157 (11%), HIV VL pending. States compliant with Biktarvy, but has not been taking bactrim. No oral thrush on exam. Lspine XR here unremarkable.    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

## 2023-06-19 NOTE — PROGRESS NOTE ADULT - PROBLEM SELECTOR PLAN 3
C/w Biktarvy  - CD4 count in December 2022 was 145, viral load still detectable in January 2023  - On Bactrim  - repeat CD4 157

## 2023-06-19 NOTE — CONSULT NOTE ADULT - SUBJECTIVE AND OBJECTIVE BOX
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 States he has had these symptoms for many years since being diagnosed with GBS and has had multiple rehab stays for physical therapy. Reports going to a medicine clinic in [place] and is currently taking Percocet PRN, gabapentin, Lyrica, and baclofen. He feels like gabapentin does not work for him anymore. Pt was not able to ambulate on his own in the ER and is now being admitted to medicine service.     Patients states has been compliant with his Biktarvy, has been living in [facility] and follows with medical care there. Has not made it to our HIV clinic for HIV followup. CD4 count here 157 (11%), has not been taking bactrim outpatient. No fevers. LLE weakness> RLE weakness.     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      Asthma      Closed fracture of multiple ribs of right side, initial encounter      Cocaine abuse      Chronic sinusitis      Homeless      GBS (Guillain [place] syndrome)      History of orthopedic surgery  left arm    Allergies    Toradol (Swelling)  Ceclor (Unknown)  Toradol (Anaphylaxis; Swelling)    Intolerances    ANTIMICROBIALS:  bictegravir 50 mG/emtricitabine 200 mG/tenofovir alafenamide 25 mG (BIKTARVY) 1 daily  trimethoprim  160 mG/sulfamethoxazole 800 mG 1 <User Schedule>    OTHER MEDS:  acetaminophen     Tablet .. 975 milliGRAM(s) Oral every 6 hours PRN  benzocaine/menthol Lozenge 1 Lozenge Oral every 3 hours PRN  enoxaparin Injectable 40 milliGRAM(s) SubCutaneous every 24 hours  lidocaine   4% Patch 1 Patch Transdermal daily  melatonin 3 milliGRAM(s) Oral at bedtime PRN  methocarbamol 500 milliGRAM(s) Oral three times a day  nicotine -  14 mG/24Hr(s) Patch 1 Patch Transdermal daily  pregabalin 75 milliGRAM(s) Oral three times a day  QUEtiapine 100 milliGRAM(s) Oral daily  QUEtiapine 300 milliGRAM(s) Oral at bedtime    SOCIAL HISTORY: prior cocaine use, lives in [facility]    FAMILY HISTORY:  FH: HIV infection (Mother)    Drug Dosing Weight  Height (cm): 180.3 (2023 02:49)  Weight (kg): 77.1 (15 Mian 2023 04:47)  BMI (kg/m2): 23.7 (2023 02:49)  BSA (m2): 1.97 (2023 02:49)    PE:    Vital Signs Last 24 Hrs  T(C): 36.9 (2023 07:02), Max: 36.9 (2023 12:00)  T(F): 98.4 (2023 07:02), Max: 98.4 (2023 12:00)  HR: 71 (2023 07:02) (71 - 88)  BP: 108/65 (2023 07:02) (108/65 - 120/64)  BP(mean): --  RR: 17 (2023 07:02) (17 - 17)  SpO2: 99% (2023 07:02) (98% - 100%)    Parameters below as of 2023 20:30  Patient On (Oxygen Delivery Method): room air        Gen: AOx3, NAD, non-toxic, pleasant  CV: S1+S2 normal, no murmurs, nontachycardic  Resp: Clear bilat, no resp distress, no crackles/wheezes  Abd: Soft, nontender, +BS  Ext: No LE edema, no wounds  : No Brizuela  IV/Skin: No thrombophlebitis  Msk: No low back tenderness, LLE weaker than RLE  Neuro: No sensory deficits, no motor deficits    LABS:                          11.9   2.36  )-----------( 162      ( 2023 06:46 )             36.9       06-    137  |  104  |  7   ----------------------------<  102<H>  3.7   |  21<L>  |  0.74    Ca    8.3<L>      2023 06:46  Phos  3.0     06-  Mg     1.60         TPro  7.3  /  Alb  3.7  /  TBili  0.4  /  DBili  x   /  AST  22  /  ALT  15  /  AlkPhos  57  -18    MICROBIOLOGY:  v    HIV-1 RNA Quantitative, Viral Load Lo.95 (23 @ 05:30)  HIV-1 RNA Quantitative, Viral Load Lo.59 (22 @ 06:26)  HIV-1 RNA Quantitative, Viral Load Lo.71 (22 @ 05:52)  HIV-1 RNA Quantitative, Viral Load Lo.1 (22 @ 11:06)  HIV-1 RNA Quantitative, Viral Load Lo.93 (22 @ 08:36)  HIV-1 RNA Quantitative, Viral Load Lo.42 (22 @ 05:40)    RADIOLOGY:    < from: Xray Lumbosacral Spine (06.15.23 @ 07:27) >  IMPRESSION:   No acute radiographic lumbar spine osseous pathology.  If back pain has markedly increased compared to initial MR 2022,   follow-up MR suggested to exclude increased disc herniation.    < end of copied text >    < from: Xray Knee 3 Views, Left (23 @ 03:33) >  IMPRESSION:  No radiographic osseous pathology.    < end of copied text >

## 2023-06-19 NOTE — CONSULT NOTE ADULT - ASSESSMENT
31 yo M w/ PMHx congenital HIV, chronic back pain, substance use, HIV myelopathy, asthma, multiple prior ED visits/ admissions for fluctuating/ worsening LE weakness and urinary retention, presented to [facility] on 6/17/23 for back pain, LE weakness (L worse than R), saddle anesthesia, incontinence. Had imaging in the past here, multiple spine MRIs without cord/cauda equina compression / abnormal enhancement or lesions, LP in 2021, and EMG/NCS. Had Rheum work up for myelopathy and it was negative. He takes baclofen, gabapentin, lyrica. Prior differential included HIV myelopathy and not GBS based on exam and CSF studies. Although ID previously had not suspected HIV myelopathy given it is considered end stage disease of HIV, his adherence to medications may be difficult due to his domiciled status     Impression: B/l LE weakness, incontinence, with signs of upper motor neuron disease could be from myelopathy from HIV. No obvious triggering history for worsening however    Recommendations:  [ ] Defer MR for now since stereotypical symptoms of worsening b/l LE weakness  [ ] Recommend PT, OT, SW evaluation as patient now amenable to moving to long term housing ?SNF as he has difficulty managing ADLs given his symptoms  [ ] Can continue his prescribed doses for lyrica, gabapentin, baclofen. Consider pain consultation. Has reported prior relief from robaxin  [ ] Expressed importance of outpatient follow up with ID and neurology. If he has insurance difficulty, can follow up with neurology resident clinic  at [facility] 4th floor. Otherwise can see Dr Raman Tapia or Dr Des Garcia.  [ ] B12, MMA, homocysteine, folate, CK, ESR, CRP, HIV labs (VL, CD4 count), A1c    Case to be seen and discussed with general neurology attending Dr Fam  31 yo M w/ PMHx congenital HIV, chronic back pain, substance use, HIV myelopathy, asthma, multiple prior ED visits/ admissions for fluctuating/ worsening LE weakness and urinary retention, presented to [facility] on 6/17/23 for back pain, LE weakness (L worse than R), saddle anesthesia, incontinence. Had imaging in the past here, multiple spine MRIs without cord/cauda equina compression / abnormal enhancement or lesions, LP in 2021, and EMG/NCS. Had Rheum work up for myelopathy and it was negative. He takes baclofen, gabapentin, lyrica. Prior differential included HIV myelopathy and not GBS based on exam and CSF studies. Although ID previously had not suspected HIV myelopathy given it is considered end stage disease of HIV, his adherence to medications may be difficult due to his domiciled status. Lab significant for viral load in 775K.     Impression: B/l LE weakness, incontinence, with signs of upper motor neuron disease could be from myelopathy from HIV. No obvious triggering history for worsening however does have elevated viral load, concerning for medication nonadherence    Recommendations:  [ ] Defer MR for now since stereotypical symptoms of worsening b/l LE weakness  [ ] Recommend PT, OT, SW evaluation  [ ] Would confirm when last taken baclofen, as can have withdrawal. Can continue his prescribed doses for lyrica, gabapentin  [ ] Expressed importance of outpatient follow up with ID and neurology. If he has insurance difficulty, can follow up with neurology resident clinic  at [facility] 4th floor. Otherwise can see Dr Raman Tapia or Dr Des Garcia.  [ ] B12, MMA, homocysteine, folate, CK    Case seen and discussed with general neurology attending Dr Fam

## 2023-06-19 NOTE — CONSULT NOTE ADULT - SUBJECTIVE AND OBJECTIVE BOX
HPI:  33 yo M w/ PMHx congenital HIV, chronic back pain, substance use, HIV myelopathy, asthma, multiple prior ED visits/ admissions for fluctuating/ worsening LE weakness and urinary retention, presents again for back pain, LE weakness, and trouble with urination and defecation. Neurology consulted for further evaluation. Per patient, 2 weeks ago, he had noted sudden onset weakness in both legs. He walks with a walker at baseline and he could not ambulate on his own with walker. He said he went to [facility], and was not started on any new medications. He reported occasional dysarthria and unclear sided weakness. Medical doctors evaluated him at [place], but was resolved by that time, so was not recommended further work up. He endorses chronic b/l feet numbness.     Of note, has been seen by neurology multiple times in the past, 11/19/22, 9/13/22, by myself in 8/9/22, 1/2022, 9/2021 frequently for similar symptoms (ie several days of worsening LE weakness of toes to hips, bowel/bladder incontinence, lower back pain, muscle spasms in lumbar back). Takes baclofen, gabapentin, lyrica. In 9/13/22 he was evaluated by neuro for fluctuating LE weakness and couldn't get out of bed. Most evaluations had suspicion for b/l LE weakness due to myelopathy 2/2 HIV infection and NOT GBS. Patient has had hx of normal LP and normal spine MRI's. Prior hx of GBS noted to be incorrect (refer to consult notes from 1/2022 and 9/2021). However, prior notes suggest ID did not suspect HIV myelopathy given it is considered end stage disease of HIV and has had good range for CD4 count and VL. Patient has had multiple MRI lumbar spines (6/5/21, 5/21/21, 5/2/21, 10/26/20). MRI cervical spine (5/2/21), MRI thoracic spine (5/2/21, 10/26/20), MRI head (3/25/20). Most recent MRI of thoracic and lumbar spine 9/2/22 showed  No cord or cauda equina compression. No abnormal cord signal or enhancement. HIE notes from other systems report EMG/NCS in 2021 as being normal. Also has had extensive workup done in [facility] system and at other hospitals. Prior tox w/u has also revealed pos for cocaine and marijuana.    REVIEW OF SYSTEMS  A 10-system ROS was performed and is negative except for those items noted above and/or in the HPI.    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      Asthma      Closed fracture of multiple ribs of right side, initial encounter      Cocaine abuse      Chronic sinusitis      Homeless      GBS (Guillain [place] syndrome)      History of orthopedic surgery  left arm        FAMILY HISTORY:  FH: HIV infection (Mother)      SOCIAL HISTORY: as noted above    MEDICATIONS (HOME):  Home Medications:  acetaminophen 325 mg oral tablet: 2 tab(s) orally every 6 hours, As needed, Temp greater or equal to 38C (100.4F), Mild Pain (1 - 3) (07 Jan 2023 15:00)  baclofen 5 mg oral tablet: 1 tab(s) orally every 8 hours, As needed, Muscle Spasm or pain (04 Jan 2023 17:20)  melatonin 3 mg oral tablet: 1 tab(s) orally once a day (at bedtime), As needed, Insomnia (04 Jan 2023 17:20)  pregabalin 50 mg oral capsule: 1 cap(s) orally 3 times a day (04 Jan 2023 17:20)    MEDICATIONS  (STANDING):  bictegravir 50 mG/emtricitabine 200 mG/tenofovir alafenamide 25 mG (BIKTARVY) 1 Tablet(s) Oral daily  enoxaparin Injectable 40 milliGRAM(s) SubCutaneous every 24 hours  lidocaine   4% Patch 1 Patch Transdermal daily  methocarbamol 500 milliGRAM(s) Oral three times a day  nicotine -  14 mG/24Hr(s) Patch 1 Patch Transdermal daily  pregabalin 75 milliGRAM(s) Oral three times a day  QUEtiapine 100 milliGRAM(s) Oral daily  QUEtiapine 300 milliGRAM(s) Oral at bedtime  trimethoprim  160 mG/sulfamethoxazole 800 mG 1 Tablet(s) Oral <User Schedule>    MEDICATIONS  (PRN):  acetaminophen     Tablet .. 975 milliGRAM(s) Oral every 6 hours PRN Moderate Pain (4 - 6)  benzocaine/menthol Lozenge 1 Lozenge Oral every 3 hours PRN Sore Throat  melatonin 3 milliGRAM(s) Oral at bedtime PRN Insomnia    ALLERGIES/INTOLERANCES:  Allergies  Toradol (Swelling)  Ceclor (Unknown)  Toradol (Anaphylaxis; Swelling)    Intolerances    VITALS & EXAMINATION:  Vital Signs Last 24 Hrs  T(C): 36.4 (19 Jun 2023 12:51), Max: 36.9 (19 Jun 2023 07:02)  T(F): 97.6 (19 Jun 2023 12:51), Max: 98.4 (19 Jun 2023 07:02)  HR: 81 (19 Jun 2023 12:51) (71 - 81)  BP: 116/65 (19 Jun 2023 12:51) (108/65 - 116/76)  BP(mean): --  RR: 18 (19 Jun 2023 12:51) (17 - 18)  SpO2: 100% (19 Jun 2023 12:51) (98% - 100%)    Parameters below as of 19 Jun 2023 12:51  Patient On (Oxygen Delivery Method): room air        General:  Constitutional: thin Male, appears stated age, in no apparent distress including pain  Head: Normocephalic & atraumatic.    Neurological (>12):  MS: Eyes open. Responds to verbal stimuli. Awake, alert, oriented to person, place, situation, time. Normal affect. Follows all commands.    Language: Speech is clear, fluent with good repetition & comprehension     CNs: PERRLA (R = 3mm, L = 3mm). VFF. EOMI no nystagmus, no diplopia. V1-3 intact to LT, well developed masseter muscles b/l. No facial asymmetry b/l, full eye closure strength b/l. Hearing grossly normal (rubbing fingers) b/l. Symmetric palate elevation in midline. Gag reflex deferred. Head turning & shoulder shrug intact b/l. Tongue midline, normal movements, no atrophy.    Fundoscopic: pale w/ sharp discs margins No vascular changes.      Motor: Normal muscle bulk & tone. No noticeable tremor or seizure. No pronator drift.              Deltoid	Biceps	Triceps	   R	5	5	5	5		  L	5	5	5	5	    	H-Flex	D-Flex	P-Flex  R	3	5	5		   L	3	5	5		     Sensation: Intact to LT/PP/Temp/Vibration/Position b/l throughout.     Cortical: Extinction on DSS (neglect): none    Reflexes: No clonus              Biceps(C5)       BR(C6)     Triceps(C7)               Patellar(L4)    Achilles(S1)    Plantar Resp  R	2	          2	             2		        3		    2		up   L	2	          2	             2		        3		    2		up    Coordination: intact rapid-alt movements. No dysmetria to FTN    Gait: Deferred    LABORATORY:  CBC                       11.9   2.36  )-----------( 162      ( 19 Jun 2023 06:46 )             36.9     Chem 06-19    137  |  104  |  7   ----------------------------<  102<H>  3.7   |  21<L>  |  0.74    Ca    8.3<L>      19 Jun 2023 06:46  Phos  3.0     06-19  Mg     1.60     06-19    TPro  7.3  /  Alb  3.7  /  TBili  0.4  /  DBili  x   /  AST  22  /  ALT  15  /  AlkPhos  57  06-18    LFTs LIVER FUNCTIONS - ( 18 Jun 2023 06:30 )  Alb: 3.7 g/dL / Pro: 7.3 g/dL / ALK PHOS: 57 U/L / ALT: 15 U/L / AST: 22 U/L / GGT: x           STUDIES & IMAGING:  Studies (EKG, EEG, EMG, etc):     Radiology (XR, CT, MR, U/S, TTE/GARFIELD): HPI:  31 yo M w/ PMHx congenital HIV, chronic back pain, substance use, HIV myelopathy, asthma, multiple prior ED visits/ admissions for fluctuating/ worsening LE weakness and urinary retention, presents again for back pain, LE weakness, and trouble with urination and defecation. Neurology consulted for further evaluation. Per patient, 2 weeks ago, he had noted sudden onset weakness in both legs. He walks with a walker at baseline and he could not ambulate on his own with walker. He said he went to [facility], and was not started on any new medications. He reported occasional dysarthria and unclear sided weakness. Medical doctors evaluated him at [place], but was resolved by that time, so was not recommended further work up. He endorses chronic b/l feet numbness.     Of note, has been seen by neurology multiple times in the past, 11/19/22, 9/13/22, by myself in 8/9/22, 1/2022, 9/2021 frequently for similar symptoms (ie several days of worsening LE weakness of toes to hips, bowel/bladder incontinence, lower back pain, muscle spasms in lumbar back). Takes baclofen, gabapentin, lyrica. In 9/13/22 he was evaluated by neuro for fluctuating LE weakness and couldn't get out of bed. Most evaluations had suspicion for b/l LE weakness due to myelopathy 2/2 HIV infection and NOT GBS. Patient has had hx of normal LP and normal spine MRI's. Prior hx of GBS noted to be incorrect (refer to consult notes from 1/2022 and 9/2021). However, prior notes suggest ID did not suspect HIV myelopathy given it is considered end stage disease of HIV and has had good range for CD4 count and VL. Patient has had multiple MRI lumbar spines (6/5/21, 5/21/21, 5/2/21, 10/26/20). MRI cervical spine (5/2/21), MRI thoracic spine (5/2/21, 10/26/20), MRI head (3/25/20). Most recent MRI of thoracic and lumbar spine 9/2/22 showed  No cord or cauda equina compression. No abnormal cord signal or enhancement. HIE notes from other systems report EMG/NCS in 2021 as being normal. Also has had extensive workup done in [facility] system and at other hospitals. Prior tox w/u has also revealed pos for cocaine and marijuana.    REVIEW OF SYSTEMS  A 10-system ROS was performed and is negative except for those items noted above and/or in the HPI.    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      Asthma      Closed fracture of multiple ribs of right side, initial encounter      Cocaine abuse      Chronic sinusitis      Homeless      GBS (Guillain [place] syndrome)      History of orthopedic surgery  left arm        FAMILY HISTORY:  FH: HIV infection (Mother)      SOCIAL HISTORY: as noted above    MEDICATIONS (HOME):  Home Medications:  acetaminophen 325 mg oral tablet: 2 tab(s) orally every 6 hours, As needed, Temp greater or equal to 38C (100.4F), Mild Pain (1 - 3) (07 Jan 2023 15:00)  baclofen 5 mg oral tablet: 1 tab(s) orally every 8 hours, As needed, Muscle Spasm or pain (04 Jan 2023 17:20)  melatonin 3 mg oral tablet: 1 tab(s) orally once a day (at bedtime), As needed, Insomnia (04 Jan 2023 17:20)  pregabalin 50 mg oral capsule: 1 cap(s) orally 3 times a day (04 Jan 2023 17:20)    MEDICATIONS  (STANDING):  bictegravir 50 mG/emtricitabine 200 mG/tenofovir alafenamide 25 mG (BIKTARVY) 1 Tablet(s) Oral daily  enoxaparin Injectable 40 milliGRAM(s) SubCutaneous every 24 hours  lidocaine   4% Patch 1 Patch Transdermal daily  methocarbamol 500 milliGRAM(s) Oral three times a day  nicotine -  14 mG/24Hr(s) Patch 1 Patch Transdermal daily  pregabalin 75 milliGRAM(s) Oral three times a day  QUEtiapine 100 milliGRAM(s) Oral daily  QUEtiapine 300 milliGRAM(s) Oral at bedtime  trimethoprim  160 mG/sulfamethoxazole 800 mG 1 Tablet(s) Oral <User Schedule>    MEDICATIONS  (PRN):  acetaminophen     Tablet .. 975 milliGRAM(s) Oral every 6 hours PRN Moderate Pain (4 - 6)  benzocaine/menthol Lozenge 1 Lozenge Oral every 3 hours PRN Sore Throat  melatonin 3 milliGRAM(s) Oral at bedtime PRN Insomnia    ALLERGIES/INTOLERANCES:  Allergies  Toradol (Swelling)  Ceclor (Unknown)  Toradol (Anaphylaxis; Swelling)    Intolerances    VITALS & EXAMINATION:  Vital Signs Last 24 Hrs  T(C): 36.4 (19 Jun 2023 12:51), Max: 36.9 (19 Jun 2023 07:02)  T(F): 97.6 (19 Jun 2023 12:51), Max: 98.4 (19 Jun 2023 07:02)  HR: 81 (19 Jun 2023 12:51) (71 - 81)  BP: 116/65 (19 Jun 2023 12:51) (108/65 - 116/76)  BP(mean): --  RR: 18 (19 Jun 2023 12:51) (17 - 18)  SpO2: 100% (19 Jun 2023 12:51) (98% - 100%)    Parameters below as of 19 Jun 2023 12:51  Patient On (Oxygen Delivery Method): room air        General:  Constitutional: thin Male, appears stated age, in no apparent distress including pain  Head: Normocephalic & atraumatic.    Neurological (>12):  MS: Eyes open. Responds to verbal stimuli. Awake, alert, oriented to person, place, situation, time. Normal affect. Follows all commands.    Language: Speech is clear, fluent with good repetition & comprehension     CNs: PERRL (R = 3mm, L = 3mm). VFF. EOMI no nystagmus, no diplopia. V1-3 intact to LT, well developed masseter muscles b/l. No facial asymmetry b/l, full eye closure strength b/l. Hearing grossly normal (rubbing fingers) b/l. Symmetric palate elevation in midline. Gag reflex deferred. Head turning & shoulder shrug intact b/l. Tongue midline, normal movements, no atrophy.    Motor: Normal muscle bulk & tone. No noticeable tremor or seizure. No pronator drift.              Deltoid	Biceps	Triceps	   R	5	5	5	5		  L	5	5	5	5	    	H-Flex	D-Flex	P-Flex  R	3	5	5		   L	3	5	5		     Sensation: Intact to LT/PP/Temp b/l throughout.     Cortical: Extinction on DSS (neglect): none    Reflexes: No clonus              Biceps(C5)       BR(C6)     Triceps(C7)               Patellar(L4)    Achilles(S1)    Plantar Resp  R	2	          2	             2		        3		    2		up   L	2	          2	             2		        3		    2		up    Coordination: intact rapid-alt movements. No dysmetria to FTN    Gait: Deferred    LABORATORY:  CBC                       11.9   2.36  )-----------( 162      ( 19 Jun 2023 06:46 )             36.9     Chem 06-19    137  |  104  |  7   ----------------------------<  102<H>  3.7   |  21<L>  |  0.74    Ca    8.3<L>      19 Jun 2023 06:46  Phos  3.0     06-19  Mg     1.60     06-19    TPro  7.3  /  Alb  3.7  /  TBili  0.4  /  DBili  x   /  AST  22  /  ALT  15  /  AlkPhos  57  06-18    LFTs LIVER FUNCTIONS - ( 18 Jun 2023 06:30 )  Alb: 3.7 g/dL / Pro: 7.3 g/dL / ALK PHOS: 57 U/L / ALT: 15 U/L / AST: 22 U/L / GGT: x           STUDIES & IMAGING:  Studies (EKG, EEG, EMG, etc):     Radiology (XR, CT, MR, U/S, TTE/GARFIELD):

## 2023-06-19 NOTE — PROGRESS NOTE ADULT - PROBLEM SELECTOR PLAN 1
X Size Of Lesion In Cm (Optional): 0.5 Reportedly takes Percocet PRN, Lyrica, gabapentin, and Baclofen PRN  -will d/c gabapentin per pt request as he does not feel it is helping with his pain  -pt requesting to trial Robaxin instead of baclofen- will start with low dosing 500mg TID  -lidocaine patch daily  -Tylenol 975mg q6h PRN for moderate pain  - will hold off on opioids at this time as ISTOP does not show any recent prescriptions for oxycodone  - pt reports anaphylaxis with Toradol, will avoid NSAIDs  - PT eval- rehab   - x-ray of lumbosacral spine on 6/15/23 reviewed which was essentially normal  - MR T/L-spine 9/2/2022: No cord or cauda equina compression. No abnormal cord signal or enhancement  - increase lyrica to 75 TID uptirate  - Neuro c/s called

## 2023-06-19 NOTE — PROGRESS NOTE ADULT - SUBJECTIVE AND OBJECTIVE BOX
[facility] Division of Hospital Medicine  Yunior Sorensen MD  Pager (M-F, 8A-5P): [phone]  Other Times:  [phone]    Patient is a 33y old  Male who presents with a chief complaint of Back pain and lower leg weakness (18 Jun 2023 11:51)      SUBJECTIVE / OVERNIGHT EVENTS: Pt states he is having back pain with shooting down b/l LE and muscle spasm.   ADDITIONAL REVIEW OF SYSTEMS: denies N/V     MEDICATIONS  (STANDING):  bictegravir 50 mG/emtricitabine 200 mG/tenofovir alafenamide 25 mG (BIKTARVY) 1 Tablet(s) Oral daily  enoxaparin Injectable 40 milliGRAM(s) SubCutaneous every 24 hours  lidocaine   4% Patch 1 Patch Transdermal daily  methocarbamol 500 milliGRAM(s) Oral three times a day  nicotine -  14 mG/24Hr(s) Patch 1 Patch Transdermal daily  pregabalin 50 milliGRAM(s) Oral three times a day  QUEtiapine 100 milliGRAM(s) Oral daily  QUEtiapine 300 milliGRAM(s) Oral at bedtime  trimethoprim  160 mG/sulfamethoxazole 800 mG 1 Tablet(s) Oral <User Schedule>    MEDICATIONS  (PRN):  acetaminophen     Tablet .. 975 milliGRAM(s) Oral every 6 hours PRN Moderate Pain (4 - 6)  benzocaine/menthol Lozenge 1 Lozenge Oral every 3 hours PRN Sore Throat  melatonin 3 milliGRAM(s) Oral at bedtime PRN Insomnia      CAPILLARY BLOOD GLUCOSE        I&O's Summary    18 Jun 2023 07:01  -  19 Jun 2023 07:00  --------------------------------------------------------  IN: 480 mL / OUT: 1050 mL / NET: -570 mL        PHYSICAL EXAM:  Vital Signs Last 24 Hrs  T(C): 36.9 (19 Jun 2023 07:02), Max: 36.9 (18 Jun 2023 12:00)  T(F): 98.4 (19 Jun 2023 07:02), Max: 98.4 (18 Jun 2023 12:00)  HR: 71 (19 Jun 2023 07:02) (71 - 88)  BP: 108/65 (19 Jun 2023 07:02) (108/65 - 120/64)  BP(mean): --  RR: 17 (19 Jun 2023 07:02) (17 - 17)  SpO2: 99% (19 Jun 2023 07:02) (98% - 100%)    Parameters below as of 18 Jun 2023 20:30  Patient On (Oxygen Delivery Method): room air      CONSTITUTIONAL: NAD  EYES:  conjunctiva and sclera clear  NECK: Supple  RESPIRATORY: Normal respiratory effort; lungs are clear to auscultation bilaterally  CARDIOVASCULAR: Regular rate and rhythm, normal S1 and S2, no murmur/rub/gallop; No lower extremity edema;   ABDOMEN: Nontender to palpation, normoactive bowel sounds, no rebound/guarding; No hepatosplenomegaly  MUSCULOSKELETAL: RT LE 3/5; LT 3-/5 able to stand on own but unsteady   PSYCH: A+O to person, place, and time; affect appropriate  NEUROLOGY: CN 2-12 are intact and symmetric;       LABS:                        11.9   2.36  )-----------( 162      ( 19 Jun 2023 06:46 )             36.9     06-19    137  |  104  |  7   ----------------------------<  102<H>  3.7   |  21<L>  |  0.74    Ca    8.3<L>      19 Jun 2023 06:46  Phos  3.0     06-19  Mg     1.60     06-19    TPro  7.3  /  Alb  3.7  /  TBili  0.4  /  DBili  x   /  AST  22  /  ALT  15  /  AlkPhos  57  06-18                RADIOLOGY & ADDITIONAL TESTS:  Results Reviewed:   Imaging Personally Reviewed:  Electrocardiogram Personally Reviewed:    COORDINATION OF CARE:  Care Discussed with Consultants/Other Providers [Y/N]:  Prior or Outpatient Records Reviewed [Y/N]:

## 2023-06-19 NOTE — PROGRESS NOTE ADULT - PROBLEM SELECTOR PLAN 2
Chronic per patient since being diagnosed with GBS/HIV myelopathy  -low suspicion for acute neurologic process/cord compression  -PT eval- rehab

## 2023-06-20 ENCOUNTER — NON-APPOINTMENT (OUTPATIENT)
Age: 33
End: 2023-06-20

## 2023-06-20 LAB
4/8 RATIO: 0.13 RATIO — LOW (ref 0.9–3.6)
ABS CD8: 830 CELLS/UL — HIGH (ref 142–740)
AMPHET UR-MCNC: NEGATIVE — SIGNIFICANT CHANGE UP
ANION GAP SERPL CALC-SCNC: 9 MMOL/L — SIGNIFICANT CHANGE UP (ref 7–14)
BARBITURATES UR SCN-MCNC: NEGATIVE — SIGNIFICANT CHANGE UP
BASOPHILS # BLD AUTO: 0.02 K/UL — SIGNIFICANT CHANGE UP (ref 0–0.2)
BASOPHILS NFR BLD AUTO: 0.6 % — SIGNIFICANT CHANGE UP (ref 0–2)
BENZODIAZ UR-MCNC: NEGATIVE — SIGNIFICANT CHANGE UP
BUN SERPL-MCNC: 11 MG/DL — SIGNIFICANT CHANGE UP (ref 7–23)
CALCIUM SERPL-MCNC: 8.5 MG/DL — SIGNIFICANT CHANGE UP (ref 8.4–10.5)
CD3 BLASTS SPEC-ACNC: 86 % — HIGH (ref 59–83)
CD3 BLASTS SPEC-ACNC: 979 CELLS/UL — SIGNIFICANT CHANGE UP (ref 672–1870)
CD4 %: 9 % — LOW (ref 30–62)
CD8 %: 73 % — HIGH (ref 12–36)
CHLORIDE SERPL-SCNC: 107 MMOL/L — SIGNIFICANT CHANGE UP (ref 98–107)
CK SERPL-CCNC: 86 U/L — SIGNIFICANT CHANGE UP (ref 30–200)
CO2 SERPL-SCNC: 23 MMOL/L — SIGNIFICANT CHANGE UP (ref 22–31)
COCAINE METAB.OTHER UR-MCNC: POSITIVE
CREAT SERPL-MCNC: 0.81 MG/DL — SIGNIFICANT CHANGE UP (ref 0.5–1.3)
CREATININE URINE RESULT, DAU: 64 MG/DL — SIGNIFICANT CHANGE UP
EGFR: 119 ML/MIN/1.73M2 — SIGNIFICANT CHANGE UP
EOSINOPHIL # BLD AUTO: 0.06 K/UL — SIGNIFICANT CHANGE UP (ref 0–0.5)
EOSINOPHIL NFR BLD AUTO: 1.8 % — SIGNIFICANT CHANGE UP (ref 0–6)
FOLATE SERPL-MCNC: 12.9 NG/ML — SIGNIFICANT CHANGE UP (ref 3.1–17.5)
GLUCOSE SERPL-MCNC: 97 MG/DL — SIGNIFICANT CHANGE UP (ref 70–99)
HCT VFR BLD CALC: 37.8 % — LOW (ref 39–50)
HCYS SERPL-MCNC: 8.1 UMOL/L — SIGNIFICANT CHANGE UP
HGB BLD-MCNC: 12 G/DL — LOW (ref 13–17)
IANC: 0.5 K/UL — LOW (ref 1.8–7.4)
IMM GRANULOCYTES NFR BLD AUTO: 0.3 % — SIGNIFICANT CHANGE UP (ref 0–0.9)
LYMPHOCYTES # BLD AUTO: 2.48 K/UL — SIGNIFICANT CHANGE UP (ref 1–3.3)
LYMPHOCYTES # BLD AUTO: 73.8 % — HIGH (ref 13–44)
MAGNESIUM SERPL-MCNC: 1.6 MG/DL — SIGNIFICANT CHANGE UP (ref 1.6–2.6)
MCHC RBC-ENTMCNC: 28.8 PG — SIGNIFICANT CHANGE UP (ref 27–34)
MCHC RBC-ENTMCNC: 31.7 GM/DL — LOW (ref 32–36)
MCV RBC AUTO: 90.6 FL — SIGNIFICANT CHANGE UP (ref 80–100)
METHADONE UR-MCNC: NEGATIVE — SIGNIFICANT CHANGE UP
MONOCYTES # BLD AUTO: 0.29 K/UL — SIGNIFICANT CHANGE UP (ref 0–0.9)
MONOCYTES NFR BLD AUTO: 8.6 % — SIGNIFICANT CHANGE UP (ref 2–14)
NEUTROPHILS # BLD AUTO: 0.5 K/UL — LOW (ref 1.8–7.4)
NEUTROPHILS NFR BLD AUTO: 14.9 % — LOW (ref 43–77)
NRBC # BLD: 0 /100 WBCS — SIGNIFICANT CHANGE UP (ref 0–0)
NRBC # FLD: 0 K/UL — SIGNIFICANT CHANGE UP (ref 0–0)
OPIATES UR-MCNC: NEGATIVE — SIGNIFICANT CHANGE UP
OXYCODONE UR-MCNC: NEGATIVE — SIGNIFICANT CHANGE UP
PCP SPEC-MCNC: SIGNIFICANT CHANGE UP
PCP UR-MCNC: NEGATIVE — SIGNIFICANT CHANGE UP
PHOSPHATE SERPL-MCNC: 3 MG/DL — SIGNIFICANT CHANGE UP (ref 2.5–4.5)
PLATELET # BLD AUTO: 186 K/UL — SIGNIFICANT CHANGE UP (ref 150–400)
POTASSIUM SERPL-MCNC: 3.9 MMOL/L — SIGNIFICANT CHANGE UP (ref 3.5–5.3)
POTASSIUM SERPL-SCNC: 3.9 MMOL/L — SIGNIFICANT CHANGE UP (ref 3.5–5.3)
RBC # BLD: 4.17 M/UL — LOW (ref 4.2–5.8)
RBC # FLD: 12.8 % — SIGNIFICANT CHANGE UP (ref 10.3–14.5)
SODIUM SERPL-SCNC: 139 MMOL/L — SIGNIFICANT CHANGE UP (ref 135–145)
T-CELL CD4 SUBSET PNL BLD: 105 CELLS/UL — LOW (ref 489–1457)
THC UR QL: POSITIVE
VIT B12 SERPL-MCNC: 349 PG/ML — SIGNIFICANT CHANGE UP (ref 200–900)
WBC # BLD: 3.36 K/UL — LOW (ref 3.8–10.5)
WBC # FLD AUTO: 3.36 K/UL — LOW (ref 3.8–10.5)

## 2023-06-20 PROCEDURE — 99232 SBSQ HOSP IP/OBS MODERATE 35: CPT

## 2023-06-20 RX ORDER — OXYCODONE AND ACETAMINOPHEN 5; 325 MG/1; MG/1
1 TABLET ORAL
Refills: 0 | DISCHARGE

## 2023-06-20 RX ORDER — PREGABALIN 225 MG/1
1000 CAPSULE ORAL DAILY
Refills: 0 | Status: DISCONTINUED | OUTPATIENT
Start: 2023-06-20 | End: 2023-06-24

## 2023-06-20 RX ADMIN — ENOXAPARIN SODIUM 40 MILLIGRAM(S): 100 INJECTION SUBCUTANEOUS at 12:19

## 2023-06-20 RX ADMIN — METHOCARBAMOL 750 MILLIGRAM(S): 500 TABLET, FILM COATED ORAL at 22:07

## 2023-06-20 RX ADMIN — QUETIAPINE FUMARATE 300 MILLIGRAM(S): 200 TABLET, FILM COATED ORAL at 22:07

## 2023-06-20 RX ADMIN — Medication 75 MILLIGRAM(S): at 22:08

## 2023-06-20 RX ADMIN — Medication 975 MILLIGRAM(S): at 22:47

## 2023-06-20 RX ADMIN — BICTEGRAVIR SODIUM, EMTRICITABINE, AND TENOFOVIR ALAFENAMIDE FUMARATE 1 TABLET(S): 30; 120; 15 TABLET ORAL at 12:18

## 2023-06-20 RX ADMIN — Medication 975 MILLIGRAM(S): at 22:07

## 2023-06-20 RX ADMIN — Medication 975 MILLIGRAM(S): at 15:30

## 2023-06-20 RX ADMIN — Medication 3 MILLIGRAM(S): at 22:11

## 2023-06-20 RX ADMIN — Medication 975 MILLIGRAM(S): at 08:48

## 2023-06-20 RX ADMIN — METHOCARBAMOL 750 MILLIGRAM(S): 500 TABLET, FILM COATED ORAL at 13:28

## 2023-06-20 RX ADMIN — Medication 75 MILLIGRAM(S): at 07:15

## 2023-06-20 RX ADMIN — METHOCARBAMOL 750 MILLIGRAM(S): 500 TABLET, FILM COATED ORAL at 07:14

## 2023-06-20 RX ADMIN — Medication 75 MILLIGRAM(S): at 13:28

## 2023-06-20 RX ADMIN — QUETIAPINE FUMARATE 100 MILLIGRAM(S): 200 TABLET, FILM COATED ORAL at 12:23

## 2023-06-20 RX ADMIN — Medication 975 MILLIGRAM(S): at 09:18

## 2023-06-20 RX ADMIN — PREGABALIN 1000 MICROGRAM(S): 225 CAPSULE ORAL at 12:20

## 2023-06-20 NOTE — PROGRESS NOTE ADULT - PROBLEM SELECTOR PLAN 1
Reportedly takes Percocet PRN, Lyrica, gabapentin, and Baclofen PRN. will need to verify meds  - MR T/L-spine 9/2/2022: No cord or cauda equina compression. No abnormal cord signal or enhancement  - x-ray of lumbosacral spine on 6/15/23 reviewed which was essentially normal  - will d/c gabapentin per pt request as he does not feel it is helping with his pain  - will hold off on opioids at this time as ISTOP does not show any recent prescriptions for oxycodone  - pt reports anaphylaxis with Toradol, will avoid NSAIDs  - PT eval- rehab   - pain management recs appreciated - methocarbomol 750 q8, lidocaine patch daily, tyelnol 650 standing x 2 days  - c/w lyrica to 75 TID uptirate q 48 hrs to 100 TID Reportedly takes Percocet PRN, Lyrica, gabapentin, and Baclofen PRN. will need to verify meds  - MR T/L-spine 9/2/2022: No cord or cauda equina compression. No abnormal cord signal or enhancement  - x-ray of lumbosacral spine on 6/15/23 reviewed which was essentially normal  - will d/c gabapentin per pt request as he does not feel it is helping with his pain  - will hold off on opioids at this time as ISTOP does not show any recent prescriptions for oxycodone  - pt reports anaphylaxis with Toradol, will avoid NSAIDs  - PT eval- rehab   - pain management recs appreciated - methocarbomol 750 q8, lidocaine patch daily, Tylenol 650 standing x 2 days  - c/w lyrica to 75 TID uptirate q 48 hrs to 100 TID

## 2023-06-20 NOTE — PROGRESS NOTE ADULT - PROBLEM SELECTOR PLAN 3
C/w Biktarvy  - CD4 count in December 2022 was 145, viral load still detectable in January 2023  - On Bactrim  - repeat CD4 157; VL 775k  - Genosure as per ID

## 2023-06-20 NOTE — PROGRESS NOTE ADULT - SUBJECTIVE AND OBJECTIVE BOX
[facility] of Hospital Medicine  Yunior Sorensen MD  Pager (M-F, 8A-5P): [phone]  Other Times:  [phone]    Patient is a 33y old  Male who presents with a chief complaint of Back pain and lower leg weakness (19 Jun 2023 16:19)      SUBJECTIVE / OVERNIGHT EVENTS:  ADDITIONAL REVIEW OF SYSTEMS:    MEDICATIONS  (STANDING):  acetaminophen     Tablet .. 975 milliGRAM(s) Oral every 6 hours  bictegravir 50 mG/emtricitabine 200 mG/tenofovir alafenamide 25 mG (BIKTARVY) 1 Tablet(s) Oral daily  cyanocobalamin 1000 MICROGram(s) Oral daily  enoxaparin Injectable 40 milliGRAM(s) SubCutaneous every 24 hours  lidocaine   4% Patch 1 Patch Transdermal daily  methocarbamol 750 milliGRAM(s) Oral every 8 hours  nicotine -  14 mG/24Hr(s) Patch 1 Patch Transdermal daily  pregabalin 75 milliGRAM(s) Oral three times a day  QUEtiapine 100 milliGRAM(s) Oral daily  QUEtiapine 300 milliGRAM(s) Oral at bedtime  trimethoprim  160 mG/sulfamethoxazole 800 mG 1 Tablet(s) Oral <User Schedule>    MEDICATIONS  (PRN):  benzocaine/menthol Lozenge 1 Lozenge Oral every 3 hours PRN Sore Throat  melatonin 3 milliGRAM(s) Oral at bedtime PRN Insomnia      CAPILLARY BLOOD GLUCOSE        I&O's Summary      PHYSICAL EXAM:  Vital Signs Last 24 Hrs  T(C): 37 (20 Jun 2023 07:30), Max: 37 (20 Jun 2023 07:30)  T(F): 98.6 (20 Jun 2023 07:30), Max: 98.6 (20 Jun 2023 07:30)  HR: 60 (20 Jun 2023 07:30) (60 - 81)  BP: 118/63 (20 Jun 2023 07:30) (113/62 - 118/75)  BP(mean): --  RR: 18 (20 Jun 2023 07:30) (16 - 18)  SpO2: 100% (20 Jun 2023 07:30) (100% - 100%)    Parameters below as of 20 Jun 2023 03:36  Patient On (Oxygen Delivery Method): room air      CONSTITUTIONAL: NAD, well-developed, well-groomed  EYES: PERRLA; conjunctiva and sclera clear  ENMT: Moist oral mucosa, no pharyngeal injection or exudates; normal dentition  NECK: Supple, no palpable masses; no thyromegaly  RESPIRATORY: Normal respiratory effort; lungs are clear to auscultation bilaterally  CARDIOVASCULAR: Regular rate and rhythm, normal S1 and S2, no murmur/rub/gallop; No lower extremity edema; Peripheral pulses are 2+ bilaterally  ABDOMEN: Nontender to palpation, normoactive bowel sounds, no rebound/guarding; No hepatosplenomegaly  MUSCULOSKELETAL:  Normal gait; no clubbing or cyanosis of digits; no joint swelling or tenderness to palpation  PSYCH: A+O to person, place, and time; affect appropriate  NEUROLOGY: CN 2-12 are intact and symmetric; no gross sensory deficits   SKIN: No rashes; no palpable lesions    LABS:                        12.0   3.36  )-----------( 186      ( 20 Jun 2023 07:25 )             37.8     06-20    139  |  107  |  11  ----------------------------<  97  3.9   |  23  |  0.81    Ca    8.5      20 Jun 2023 07:25  Phos  3.0     06-20  Mg     1.60     06-20        CARDIAC MARKERS ( 20 Jun 2023 07:25 )  x     / x     / 86 U/L / x     / x                RADIOLOGY & ADDITIONAL TESTS:  Results Reviewed:   Imaging Personally Reviewed:  Electrocardiogram Personally Reviewed:    COORDINATION OF CARE:  Care Discussed with Consultants/Other Providers [Y/N]:  Prior or Outpatient Records Reviewed [Y/N]:   [facility]  Yunior Sorensen MD  Pager (M-F, 8A-5P): [phone]  Other Times:  [phone]    Patient is a 33y old  Male who presents with a chief complaint of Back pain and lower leg weakness (19 Jun 2023 16:19)      SUBJECTIVE / OVERNIGHT EVENTS: pain better today intermittent currently controlled with meds        MEDICATIONS  (STANDING):  acetaminophen     Tablet .. 975 milliGRAM(s) Oral every 6 hours  bictegravir 50 mG/emtricitabine 200 mG/tenofovir alafenamide 25 mG (BIKTARVY) 1 Tablet(s) Oral daily  cyanocobalamin 1000 MICROGram(s) Oral daily  enoxaparin Injectable 40 milliGRAM(s) SubCutaneous every 24 hours  lidocaine   4% Patch 1 Patch Transdermal daily  methocarbamol 750 milliGRAM(s) Oral every 8 hours  nicotine -  14 mG/24Hr(s) Patch 1 Patch Transdermal daily  pregabalin 75 milliGRAM(s) Oral three times a day  QUEtiapine 100 milliGRAM(s) Oral daily  QUEtiapine 300 milliGRAM(s) Oral at bedtime  trimethoprim  160 mG/sulfamethoxazole 800 mG 1 Tablet(s) Oral <User Schedule>    MEDICATIONS  (PRN):  benzocaine/menthol Lozenge 1 Lozenge Oral every 3 hours PRN Sore Throat  melatonin 3 milliGRAM(s) Oral at bedtime PRN Insomnia      CAPILLARY BLOOD GLUCOSE        I&O's Summary      PHYSICAL EXAM:  Vital Signs Last 24 Hrs  T(C): 37 (20 Jun 2023 07:30), Max: 37 (20 Jun 2023 07:30)  T(F): 98.6 (20 Jun 2023 07:30), Max: 98.6 (20 Jun 2023 07:30)  HR: 60 (20 Jun 2023 07:30) (60 - 81)  BP: 118/63 (20 Jun 2023 07:30) (113/62 - 118/75)  BP(mean): --  RR: 18 (20 Jun 2023 07:30) (16 - 18)  SpO2: 100% (20 Jun 2023 07:30) (100% - 100%)    Parameters below as of 20 Jun 2023 03:36  Patient On (Oxygen Delivery Method): room air      CONSTITUTIONAL: NAD  EYES: conjunctiva and sclera clear  RESPIRATORY: Normal respiratory effort; lungs are clear to auscultation bilaterally  CARDIOVASCULAR: Regular rate and rhythm, normal S1 and S2, no murmur/rub/gallop;   ABDOMEN: Nontender to palpation, normoactive bowel sounds, no rebound/guarding;   MUSCULOSKELETAL: no joint swelling or tenderness to palpation  PSYCH: A+O to person, place, and time; affect appropriate  NEUROLOGY: RT LE 4/5; LT 3-4/5     LABS:                        12.0   3.36  )-----------( 186      ( 20 Jun 2023 07:25 )             37.8     06-20    139  |  107  |  11  ----------------------------<  97  3.9   |  23  |  0.81    Ca    8.5      20 Jun 2023 07:25  Phos  3.0     06-20  Mg     1.60     06-20        CARDIAC MARKERS ( 20 Jun 2023 07:25 )  x     / x     / 86 U/L / x     / x                RADIOLOGY & ADDITIONAL TESTS:  Results Reviewed:   Imaging Personally Reviewed:  Electrocardiogram Personally Reviewed:    COORDINATION OF CARE:  Care Discussed with Consultants/Other Providers [Y/N]:  Prior or Outpatient Records Reviewed [Y/N]:

## 2023-06-20 NOTE — H&P ADULT - REASON FOR ADMISSION
Symptom follow-up  Message [phone]  From   Migue Kelly MD To   Chanel, Newton FERREIRA Sent   5/25/2023  2:10 PM For Delivery On   6/6/2023   Last Read in [employer]   6/14/2023 10:04 AM by Newton Buchanan     Patient did read the [employer] message. Hasn't replied.    Closing encounter since patient did read the message.  Arabella Angelo RN on 6/20/2023 at 9:46 AM       Acute [facility] acquired PNA

## 2023-06-20 NOTE — PROGRESS NOTE ADULT - PROBLEM SELECTOR PLAN 2
Chronic per patient since being diagnosed with GBS/HIV myelopathy  - low suspicion for acute neurologic process/cord compression  - PT eval- rehab  - Neuro appreciated- no need for further imaging suspect HIV myelopathy given poss non compliance with meds and elevated viral load  [ ] B12, MMA, homocysteine, folate, CK Chronic per patient since being diagnosed with GBS/HIV myelopathy  - low suspicion for acute neurologic process/cord compression  - PT eval- rehab  - Neuro appreciated- no need for further imaging suspect HIV myelopathy given poss non compliance with meds and elevated viral load  - neuro does not think b-12 contributing but keep above 400  - B12 -349 ,  homocysteine- normal, folate- normal , CK- normal; MMA- in lab

## 2023-06-20 NOTE — PHARMACOTHERAPY INTERVENTION NOTE - COMMENTS
Medication history is complete. Medication list updated in Outpatient Medication Record (OM[facility]). Please call spectra [phone] if you have any questions.   source: patient, pharmacies, [facility], Surescripts, medical record

## 2023-06-20 NOTE — PROGRESS NOTE ADULT - NUTRITIONAL ASSESSMENT
33 yo M w/ PMHx congenital HIV, chronic back pain, substance use, HIV myelopathy, asthma, multiple prior ED visits/ admissions for fluctuating/ worsening LE weakness and urinary retention, presented to [facility] on 6/17/23 for back pain, LE weakness (L worse than R), saddle anesthesia, incontinence. Had imaging in the past here, multiple spine MRIs without cord/cauda equina compression / abnormal enhancement or lesions, LP in 2021, and EMG/NCS. Had Rheum work up for myelopathy and it was negative. He takes baclofen, gabapentin, lyrica. Prior differential included HIV myelopathy and not GBS based on exam and CSF studies. Although ID previously had not suspected HIV myelopathy given it is considered end stage disease of HIV, his adherence to medications may be difficult due to his domiciled status. Lab significant for viral load in 775K.     Impression: B/l LE weakness, incontinence, with signs of upper motor neuron disease could be from myelopathy from HIV. No obvious triggering history for worsening however does have elevated viral load, concerning for medication nonadherence    Recommendations:  [ ] Defer MR for now since stereotypical symptoms of worsening b/l LE weakness  [ ] Recommend PT, OT, SW evaluation  [ ] Would confirm when last taken baclofen, as can have withdrawal. Can continue his prescribed doses for lyrica, gabapentin  [ ] Expressed importance of outpatient follow up with ID and neurology. If he has insurance difficulty, can follow up with neurology resident clinic  at [facility] 4th floor. Otherwise can see Dr Raman Tapia or Dr Des Garcia.      Case seen and discussed with general neurology attending Dr Fam      Attestation Statements:    Attestation Statements:  I have personally seen and examined the patient.  I fully participated in the care of this patient.  I have made amendments to the documentation where necessary, and agree with the history, physical exam, and plan as documented by the Resident.     Reflexes 3 throughout and B upgoing toes. Spasticity in legs>arms.    Vitamin B12, Serum: 238 pg/mL (08.09.22 @ 16:00)   Vitamin B12, Serum: 405 pg/mL (12.08.22 @ 06:26)   HIV-1 RNA Quantitative, Viral Load: 775,439 (06.18.23 @ 06:30)   ABS CD4: 157 cells/uL (06.19.23 @ 07:58)     A/P  Mr. Garsia is a 32 yo man with a progressive myelopathy for at least 5 year.  He has had extensive work up in the past - no compressive myelopathy, no demyelinating disease, no other cause found on labs tests.   Given that he has a h/o congenital HIV and that he has not taken HIV medications consistently - AIDS myelopathy is possible.  Aggressive treatment of HIV.  Vitamin B12 supplementation 1000 mg Daily - not likely contributing but want to keep B 12 levels above 400.   PT/OT  Social work  Pain management  I counselled the patient regarding importance of treating HIV And B12 deficiency.   Neurology signing off. Please reconsult PRN or call [phone] with any questions.  Thank you . Attestation Statements:    Attestation Statements:  I have personally seen and examined the patient.  I fully participated in the care of this patient.  I have made amendments to the documentation where necessary, and agree with the history, physical exam, and plan as documented by the Resident.     Reflexes 3 throughout and B upgoing toes. Spasticity in legs>arms.    Vitamin B12, Serum: 238 pg/mL (08.09.22 @ 16:00)   Vitamin B12, Serum: 405 pg/mL (12.08.22 @ 06:26)   HIV-1 RNA Quantitative, Viral Load: 775,439 (06.18.23 @ 06:30)   ABS CD4: 157 cells/uL (06.19.23 @ 07:58)     A/P  Mr. Garsia is a 34 yo man with a progressive myelopathy for at least 5 year.  He has had extensive work up in the past - no compressive myelopathy, no demyelinating disease, no other cause found on labs tests.   Given that he has a h/o congenital HIV and that he has not taken HIV medications consistently - AIDS myelopathy is possible.  Aggressive treatment of HIV.  Vitamin B12 supplementation 1000 mg Daily - not likely contributing but want to keep B 12 levels above 400.   PT/OT  Social work  Pain management  I counselled the patient regarding importance of treating HIV And B12 deficiency.   Neurology signing off. Please reconsult PRN or call [facility] [phone] with any questions.  Thank you .

## 2023-06-20 NOTE — PROGRESS NOTE ADULT - SUBJECTIVE AND OBJECTIVE BOX
CC: Patient is a 33y old  Male who presents with a chief complaint of Back pain and lower leg weakness (2023 09:10)    ID following for HIV management    Interval History/ROS: Patient remains with LE weakness, slight improvement in strength in the Left leg on exam today. No fevers.    Rest of ROS negative.    Allergies  Toradol (Swelling)  Ceclor (Unknown)  Toradol (Anaphylaxis; Swelling)    ANTIMICROBIALS:  bictegravir 50 mG/emtricitabine 200 mG/tenofovir alafenamide 25 mG (BIKTARVY) 1 daily  trimethoprim  160 mG/sulfamethoxazole 800 mG 1 <User Schedule>    OTHER MEDS:  acetaminophen     Tablet .. 975 milliGRAM(s) Oral every 6 hours  benzocaine/menthol Lozenge 1 Lozenge Oral every 3 hours PRN  cyanocobalamin 1000 MICROGram(s) Oral daily  enoxaparin Injectable 40 milliGRAM(s) SubCutaneous every 24 hours  lidocaine   4% Patch 1 Patch Transdermal daily  melatonin 3 milliGRAM(s) Oral at bedtime PRN  methocarbamol 750 milliGRAM(s) Oral every 8 hours  nicotine -  14 mG/24Hr(s) Patch 1 Patch Transdermal daily  pregabalin 75 milliGRAM(s) Oral three times a day  QUEtiapine 100 milliGRAM(s) Oral daily  QUEtiapine 300 milliGRAM(s) Oral at bedtime    PE:    Vital Signs Last 24 Hrs  T(C): 37 (2023 07:30), Max: 37 (2023 07:30)  T(F): 98.6 (2023 07:30), Max: 98.6 (2023 07:30)  HR: 60 (2023 07:30) (60 - 81)  BP: 118/63 (2023 07:30) (113/62 - 118/75)  BP(mean): --  RR: 18 (2023 07:30) (16 - 18)  SpO2: 100% (2023 07:30) (100% - 100%)    Parameters below as of 2023 03:36  Patient On (Oxygen Delivery Method): room air    Gen: AOx3, NAD  CV: S1+S2 normal, no murmurs  Resp: Clear bilat, no resp distress  Abd: Soft, nontender, +BS  Ext: LE weakness, L>R - slight improvement in Left leg weakness today  : No Brizuela  IV/Skin: No thrombophlebitis  Neuro: no focal deficits    LABS:                          12.0   3.36  )-----------( 186      ( 2023 07:25 )             37.8       -    139  |  107  |  11  ----------------------------<  97  3.9   |  23  |  0.81    Ca    8.5      2023 07:25  Phos  3.0     -  Mg     1.60         MICROBIOLOGY:  v    HIV-1 RNA Quantitative, Viral Load Lo.89 (23 @ 06:30)  HIV-1 RNA Quantitative, Viral Load Lo.95 (23 @ 05:30)  HIV-1 RNA Quantitative, Viral Load Lo.59 (22 @ 06:26)  HIV-1 RNA Quantitative, Viral Load Lo.71 (22 @ 05:52)  HIV-1 RNA Quantitative, Viral Load Lo.1 (22 @ 11:06)  HIV-1 RNA Quantitative, Viral Load Lo.93 (22 @ 08:36)  HIV-1 RNA Quantitative, Viral Load Lo.42 (22 @ 05:40)    RADIOLOGY:    < from: Xray Lumbosacral Spine (06.15.23 @ 07:27) >  IMPRESSION:   No acute radiographic lumbar spine osseous pathology.  If back pain has markedly increased compared to initial MR 2022,   follow-up MR suggested to exclude increased disc herniation.    < end of copied text >

## 2023-06-21 LAB
ANION GAP SERPL CALC-SCNC: 13 MMOL/L — SIGNIFICANT CHANGE UP (ref 7–14)
BASOPHILS # BLD AUTO: 0.01 K/UL — SIGNIFICANT CHANGE UP (ref 0–0.2)
BASOPHILS NFR BLD AUTO: 0.3 % — SIGNIFICANT CHANGE UP (ref 0–2)
BUN SERPL-MCNC: 17 MG/DL — SIGNIFICANT CHANGE UP (ref 7–23)
CALCIUM SERPL-MCNC: 9.8 MG/DL — SIGNIFICANT CHANGE UP (ref 8.4–10.5)
CHLORIDE SERPL-SCNC: 104 MMOL/L — SIGNIFICANT CHANGE UP (ref 98–107)
CO2 SERPL-SCNC: 24 MMOL/L — SIGNIFICANT CHANGE UP (ref 22–31)
CREAT SERPL-MCNC: 0.75 MG/DL — SIGNIFICANT CHANGE UP (ref 0.5–1.3)
EGFR: 121 ML/MIN/1.73M2 — SIGNIFICANT CHANGE UP
EOSINOPHIL # BLD AUTO: 0.08 K/UL — SIGNIFICANT CHANGE UP (ref 0–0.5)
EOSINOPHIL NFR BLD AUTO: 2.3 % — SIGNIFICANT CHANGE UP (ref 0–6)
GLUCOSE SERPL-MCNC: 94 MG/DL — SIGNIFICANT CHANGE UP (ref 70–99)
HCT VFR BLD CALC: 37.3 % — LOW (ref 39–50)
HGB BLD-MCNC: 11.9 G/DL — LOW (ref 13–17)
IANC: 0.51 K/UL — LOW (ref 1.8–7.4)
IMM GRANULOCYTES NFR BLD AUTO: 0 % — SIGNIFICANT CHANGE UP (ref 0–0.9)
LYMPHOCYTES # BLD AUTO: 2.58 K/UL — SIGNIFICANT CHANGE UP (ref 1–3.3)
LYMPHOCYTES # BLD AUTO: 74.4 % — HIGH (ref 13–44)
MAGNESIUM SERPL-MCNC: 1.4 MG/DL — LOW (ref 1.6–2.6)
MCHC RBC-ENTMCNC: 28.3 PG — SIGNIFICANT CHANGE UP (ref 27–34)
MCHC RBC-ENTMCNC: 31.9 GM/DL — LOW (ref 32–36)
MCV RBC AUTO: 88.8 FL — SIGNIFICANT CHANGE UP (ref 80–100)
MONOCYTES # BLD AUTO: 0.29 K/UL — SIGNIFICANT CHANGE UP (ref 0–0.9)
MONOCYTES NFR BLD AUTO: 8.4 % — SIGNIFICANT CHANGE UP (ref 2–14)
NEUTROPHILS # BLD AUTO: 0.51 K/UL — LOW (ref 1.8–7.4)
NEUTROPHILS NFR BLD AUTO: 14.6 % — LOW (ref 43–77)
NRBC # BLD: 0 /100 WBCS — SIGNIFICANT CHANGE UP (ref 0–0)
NRBC # FLD: 0 K/UL — SIGNIFICANT CHANGE UP (ref 0–0)
PHOSPHATE SERPL-MCNC: 4.1 MG/DL — SIGNIFICANT CHANGE UP (ref 2.5–4.5)
PLATELET # BLD AUTO: 190 K/UL — SIGNIFICANT CHANGE UP (ref 150–400)
POTASSIUM SERPL-MCNC: 3.9 MMOL/L — SIGNIFICANT CHANGE UP (ref 3.5–5.3)
POTASSIUM SERPL-SCNC: 3.9 MMOL/L — SIGNIFICANT CHANGE UP (ref 3.5–5.3)
RBC # BLD: 4.2 M/UL — SIGNIFICANT CHANGE UP (ref 4.2–5.8)
RBC # FLD: 12.8 % — SIGNIFICANT CHANGE UP (ref 10.3–14.5)
SODIUM SERPL-SCNC: 141 MMOL/L — SIGNIFICANT CHANGE UP (ref 135–145)
WBC # BLD: 3.47 K/UL — LOW (ref 3.8–10.5)
WBC # FLD AUTO: 3.47 K/UL — LOW (ref 3.8–10.5)

## 2023-06-21 PROCEDURE — 99232 SBSQ HOSP IP/OBS MODERATE 35: CPT

## 2023-06-21 RX ORDER — MAGNESIUM SULFATE 500 MG/ML
2 VIAL (ML) INJECTION ONCE
Refills: 0 | Status: COMPLETED | OUTPATIENT
Start: 2023-06-21 | End: 2023-06-21

## 2023-06-21 RX ADMIN — BENZOCAINE AND MENTHOL 1 LOZENGE: 5; 1 LIQUID ORAL at 18:16

## 2023-06-21 RX ADMIN — METHOCARBAMOL 750 MILLIGRAM(S): 500 TABLET, FILM COATED ORAL at 21:25

## 2023-06-21 RX ADMIN — ENOXAPARIN SODIUM 40 MILLIGRAM(S): 100 INJECTION SUBCUTANEOUS at 11:35

## 2023-06-21 RX ADMIN — BENZOCAINE AND MENTHOL 1 LOZENGE: 5; 1 LIQUID ORAL at 21:25

## 2023-06-21 RX ADMIN — Medication 975 MILLIGRAM(S): at 08:58

## 2023-06-21 RX ADMIN — METHOCARBAMOL 750 MILLIGRAM(S): 500 TABLET, FILM COATED ORAL at 05:47

## 2023-06-21 RX ADMIN — Medication 975 MILLIGRAM(S): at 15:11

## 2023-06-21 RX ADMIN — PREGABALIN 1000 MICROGRAM(S): 225 CAPSULE ORAL at 11:35

## 2023-06-21 RX ADMIN — QUETIAPINE FUMARATE 100 MILLIGRAM(S): 200 TABLET, FILM COATED ORAL at 11:35

## 2023-06-21 RX ADMIN — Medication 100 MILLIGRAM(S): at 17:43

## 2023-06-21 RX ADMIN — Medication 75 MILLIGRAM(S): at 05:47

## 2023-06-21 RX ADMIN — QUETIAPINE FUMARATE 300 MILLIGRAM(S): 200 TABLET, FILM COATED ORAL at 21:25

## 2023-06-21 RX ADMIN — Medication 25 GRAM(S): at 12:00

## 2023-06-21 RX ADMIN — METHOCARBAMOL 750 MILLIGRAM(S): 500 TABLET, FILM COATED ORAL at 15:11

## 2023-06-21 RX ADMIN — Medication 3 MILLIGRAM(S): at 21:25

## 2023-06-21 RX ADMIN — BICTEGRAVIR SODIUM, EMTRICITABINE, AND TENOFOVIR ALAFENAMIDE FUMARATE 1 TABLET(S): 30; 120; 15 TABLET ORAL at 11:36

## 2023-06-21 RX ADMIN — Medication 975 MILLIGRAM(S): at 09:28

## 2023-06-21 RX ADMIN — Medication 975 MILLIGRAM(S): at 15:41

## 2023-06-21 RX ADMIN — Medication 1 TABLET(S): at 05:47

## 2023-06-21 RX ADMIN — BENZOCAINE AND MENTHOL 1 LOZENGE: 5; 1 LIQUID ORAL at 06:01

## 2023-06-21 NOTE — PROGRESS NOTE ADULT - PROBLEM SELECTOR PLAN 1
- Reportedly takes Percocet PRN, Lyrica, gabapentin, and Baclofen PRN.   - MR T/L-spine 9/2/2022: No cord or cauda equina compression. No abnormal cord signal or enhancement  - x-ray of lumbosacral spine on 6/15/23 reviewed which was essentially normal  - will d/c gabapentin per pt request as he does not feel it is helping with his pain  - pt reports anaphylaxis with Toradol, will avoid NSAIDs  - PT eval- rehab   - pain management recs appreciated - methocarbomol 750 q8, lidocaine patch daily, Tylenol 650 standing x 2 days  - Utox with cocaine and THC   - increase lyrica 100 BID

## 2023-06-21 NOTE — PROGRESS NOTE ADULT - SUBJECTIVE AND OBJECTIVE BOX
[facility] of Hospital Medicine  Yunior Sorensen MD  Pager (M-F, 8A-5P): [phone]  Other Times:  [phone]    Patient is a 33y old  Male who presents with a chief complaint of Back pain and lower leg weakness (20 Jun 2023 11:25)      SUBJECTIVE / OVERNIGHT EVENTS: pt in NAD no acute events ON   ADDITIONAL REVIEW OF SYSTEMS: denies SOB/CP    MEDICATIONS  (STANDING):  acetaminophen     Tablet .. 975 milliGRAM(s) Oral every 6 hours  bictegravir 50 mG/emtricitabine 200 mG/tenofovir alafenamide 25 mG (BIKTARVY) 1 Tablet(s) Oral daily  cyanocobalamin 1000 MICROGram(s) Oral daily  enoxaparin Injectable 40 milliGRAM(s) SubCutaneous every 24 hours  lidocaine   4% Patch 1 Patch Transdermal daily  methocarbamol 750 milliGRAM(s) Oral every 8 hours  nicotine -  14 mG/24Hr(s) Patch 1 Patch Transdermal daily  pregabalin 75 milliGRAM(s) Oral three times a day  QUEtiapine 100 milliGRAM(s) Oral daily  QUEtiapine 300 milliGRAM(s) Oral at bedtime  trimethoprim  160 mG/sulfamethoxazole 800 mG 1 Tablet(s) Oral <User Schedule>    MEDICATIONS  (PRN):  benzocaine/menthol Lozenge 1 Lozenge Oral every 3 hours PRN Sore Throat  melatonin 3 milliGRAM(s) Oral at bedtime PRN Insomnia      CAPILLARY BLOOD GLUCOSE        I&O's Summary      PHYSICAL EXAM:  Vital Signs Last 24 Hrs  T(C): 36.8 (21 Jun 2023 07:15), Max: 37.1 (20 Jun 2023 20:44)  T(F): 98.3 (21 Jun 2023 07:15), Max: 98.7 (20 Jun 2023 20:44)  HR: 65 (21 Jun 2023 07:15) (61 - 65)  BP: 122/73 (21 Jun 2023 07:15) (122/73 - 139/68)  BP(mean): --  RR: 18 (21 Jun 2023 07:15) (18 - 18)  SpO2: 100% (21 Jun 2023 07:15) (100% - 100%)    Parameters below as of 21 Jun 2023 07:15  Patient On (Oxygen Delivery Method): room air    CONSTITUTIONAL: NAD  EYES: conjunctiva and sclera clear  RESPIRATORY: Normal respiratory effort; lungs are clear to auscultation bilaterally  CARDIOVASCULAR: Regular rate and rhythm, normal S1 and S2, no murmur/rub/gallop;   ABDOMEN: Nontender to palpation, normoactive bowel sounds, no rebound/guarding;   MUSCULOSKELETAL: no joint swelling or tenderness to palpation  PSYCH: A+O to person, place, and time; affect appropriate  NEUROLOGY: RT LE 4/5; LT 3-4/5       LABS:                        11.9   3.47  )-----------( 190      ( 21 Jun 2023 05:50 )             37.3     06-21    141  |  104  |  17  ----------------------------<  94  3.9   |  24  |  0.75    Ca    9.8      21 Jun 2023 05:50  Phos  4.1     06-21  Mg     1.40     06-21        CARDIAC MARKERS ( 20 Jun 2023 07:25 )  x     / x     / 86 U/L / x     / x          Urinalysis Basic - ( 21 Jun 2023 05:50 )    Color: x / Appearance: x / SG: x / pH: x  Gluc: 94 mg/dL / Ketone: x  / Bili: x / Urobili: x   Blood: x / Protein: x / Nitrite: x   Leuk Esterase: x / RBC: x / WBC x   Sq Epi: x / Non Sq Epi: x / Bacteria: x          RADIOLOGY & ADDITIONAL TESTS:  Results Reviewed:   Imaging Personally Reviewed:  Electrocardiogram Personally Reviewed:    COORDINATION OF CARE:  Care Discussed with Consultants/Other Providers [Y/N]:  Prior or Outpatient Records Reviewed [Y/N]:

## 2023-06-21 NOTE — SBIRT NOTE ADULT - NSSBIRTDRGBRIEFINTDET_GEN_A_CORE
SW completed DAST 10 w/ patient and reviewed score. Pt reports that he smokes marijuana, does not see this as problematic. Reports that everything has pros and cons. Denies other drug use however, UDS + for cocaine on 6/20. SW attempted to complete brief intervention w/ patient by using components of motivational interviewing but he asked why SW is asking so many questions, and reported that SW was "doing too much." SW to remain available.

## 2023-06-21 NOTE — PROGRESS NOTE ADULT - PROBLEM SELECTOR PLAN 3
C/w Biktarvy  - CD4 count in December 2022 was 145, viral load still detectable in January 2023  - On Bactrim  - repeat CD4 157; VL 775k  - HIV genotype in lab

## 2023-06-21 NOTE — PROGRESS NOTE ADULT - PROBLEM SELECTOR PLAN 2
Chronic per patient since being diagnosed with GBS/HIV myelopathy  - low suspicion for acute neurologic process/cord compression  - PT eval- rehab  - Neuro appreciated- no need for further imaging suspect HIV myelopathy given poss non compliance with meds and elevated viral load   - B12 -349 ,  homocysteine- normal, folate- normal , CK- normal; MMA- in lab  - neuro does not think b-12 contributing but keep above 400; on b12 supplmentation

## 2023-06-22 LAB
ANION GAP SERPL CALC-SCNC: 13 MMOL/L — SIGNIFICANT CHANGE UP (ref 7–14)
BASOPHILS # BLD AUTO: 0.02 K/UL — SIGNIFICANT CHANGE UP (ref 0–0.2)
BASOPHILS NFR BLD AUTO: 0.5 % — SIGNIFICANT CHANGE UP (ref 0–2)
BUN SERPL-MCNC: 14 MG/DL — SIGNIFICANT CHANGE UP (ref 7–23)
CALCIUM SERPL-MCNC: 8.7 MG/DL — SIGNIFICANT CHANGE UP (ref 8.4–10.5)
CHLORIDE SERPL-SCNC: 104 MMOL/L — SIGNIFICANT CHANGE UP (ref 98–107)
CO2 SERPL-SCNC: 22 MMOL/L — SIGNIFICANT CHANGE UP (ref 22–31)
CREAT SERPL-MCNC: 0.97 MG/DL — SIGNIFICANT CHANGE UP (ref 0.5–1.3)
EGFR: 105 ML/MIN/1.73M2 — SIGNIFICANT CHANGE UP
EOSINOPHIL # BLD AUTO: 0.08 K/UL — SIGNIFICANT CHANGE UP (ref 0–0.5)
EOSINOPHIL NFR BLD AUTO: 2.1 % — SIGNIFICANT CHANGE UP (ref 0–6)
GLUCOSE SERPL-MCNC: 108 MG/DL — HIGH (ref 70–99)
HCT VFR BLD CALC: 37.2 % — LOW (ref 39–50)
HGB BLD-MCNC: 11.7 G/DL — LOW (ref 13–17)
IANC: 0.64 K/UL — LOW (ref 1.8–7.4)
IMM GRANULOCYTES NFR BLD AUTO: 0.3 % — SIGNIFICANT CHANGE UP (ref 0–0.9)
LYMPHOCYTES # BLD AUTO: 2.74 K/UL — SIGNIFICANT CHANGE UP (ref 1–3.3)
LYMPHOCYTES # BLD AUTO: 70.6 % — HIGH (ref 13–44)
MAGNESIUM SERPL-MCNC: 1.5 MG/DL — LOW (ref 1.6–2.6)
MCHC RBC-ENTMCNC: 28.7 PG — SIGNIFICANT CHANGE UP (ref 27–34)
MCHC RBC-ENTMCNC: 31.5 GM/DL — LOW (ref 32–36)
MCV RBC AUTO: 91.2 FL — SIGNIFICANT CHANGE UP (ref 80–100)
MONOCYTES # BLD AUTO: 0.39 K/UL — SIGNIFICANT CHANGE UP (ref 0–0.9)
MONOCYTES NFR BLD AUTO: 10.1 % — SIGNIFICANT CHANGE UP (ref 2–14)
NEUTROPHILS # BLD AUTO: 0.64 K/UL — LOW (ref 1.8–7.4)
NEUTROPHILS NFR BLD AUTO: 16.4 % — LOW (ref 43–77)
NRBC # BLD: 0 /100 WBCS — SIGNIFICANT CHANGE UP (ref 0–0)
NRBC # FLD: 0 K/UL — SIGNIFICANT CHANGE UP (ref 0–0)
PHOSPHATE SERPL-MCNC: 3.7 MG/DL — SIGNIFICANT CHANGE UP (ref 2.5–4.5)
PLATELET # BLD AUTO: 191 K/UL — SIGNIFICANT CHANGE UP (ref 150–400)
POTASSIUM SERPL-MCNC: 3.9 MMOL/L — SIGNIFICANT CHANGE UP (ref 3.5–5.3)
POTASSIUM SERPL-SCNC: 3.9 MMOL/L — SIGNIFICANT CHANGE UP (ref 3.5–5.3)
RBC # BLD: 4.08 M/UL — LOW (ref 4.2–5.8)
RBC # FLD: 12.7 % — SIGNIFICANT CHANGE UP (ref 10.3–14.5)
SODIUM SERPL-SCNC: 139 MMOL/L — SIGNIFICANT CHANGE UP (ref 135–145)
WBC # BLD: 3.88 K/UL — SIGNIFICANT CHANGE UP (ref 3.8–10.5)
WBC # FLD AUTO: 3.88 K/UL — SIGNIFICANT CHANGE UP (ref 3.8–10.5)

## 2023-06-22 PROCEDURE — 99232 SBSQ HOSP IP/OBS MODERATE 35: CPT

## 2023-06-22 RX ORDER — FLUCONAZOLE 150 MG/1
400 TABLET ORAL EVERY 24 HOURS
Refills: 0 | Status: DISCONTINUED | OUTPATIENT
Start: 2023-06-22 | End: 2023-06-24

## 2023-06-22 RX ORDER — MAGNESIUM OXIDE 400 MG ORAL TABLET 241.3 MG
400 TABLET ORAL
Refills: 0 | Status: DISCONTINUED | OUTPATIENT
Start: 2023-06-22 | End: 2023-06-22

## 2023-06-22 RX ORDER — ACETAMINOPHEN 500 MG
975 TABLET ORAL ONCE
Refills: 0 | Status: COMPLETED | OUTPATIENT
Start: 2023-06-22 | End: 2023-06-22

## 2023-06-22 RX ORDER — MAGNESIUM SULFATE 500 MG/ML
2 VIAL (ML) INJECTION ONCE
Refills: 0 | Status: COMPLETED | OUTPATIENT
Start: 2023-06-22 | End: 2023-06-22

## 2023-06-22 RX ORDER — ALPRAZOLAM 0.25 MG
0.5 TABLET ORAL ONCE
Refills: 0 | Status: DISCONTINUED | OUTPATIENT
Start: 2023-06-22 | End: 2023-06-22

## 2023-06-22 RX ADMIN — BICTEGRAVIR SODIUM, EMTRICITABINE, AND TENOFOVIR ALAFENAMIDE FUMARATE 1 TABLET(S): 30; 120; 15 TABLET ORAL at 11:43

## 2023-06-22 RX ADMIN — MAGNESIUM OXIDE 400 MG ORAL TABLET 400 MILLIGRAM(S): 241.3 TABLET ORAL at 10:02

## 2023-06-22 RX ADMIN — BENZOCAINE AND MENTHOL 1 LOZENGE: 5; 1 LIQUID ORAL at 11:42

## 2023-06-22 RX ADMIN — Medication 100 MILLIGRAM(S): at 05:36

## 2023-06-22 RX ADMIN — BENZOCAINE AND MENTHOL 1 LOZENGE: 5; 1 LIQUID ORAL at 16:03

## 2023-06-22 RX ADMIN — BENZOCAINE AND MENTHOL 1 LOZENGE: 5; 1 LIQUID ORAL at 05:37

## 2023-06-22 RX ADMIN — Medication 25 GRAM(S): at 18:26

## 2023-06-22 RX ADMIN — Medication 100 MILLIGRAM(S): at 17:14

## 2023-06-22 RX ADMIN — Medication 0.5 MILLIGRAM(S): at 13:20

## 2023-06-22 RX ADMIN — Medication 975 MILLIGRAM(S): at 21:23

## 2023-06-22 RX ADMIN — FLUCONAZOLE 400 MILLIGRAM(S): 150 TABLET ORAL at 17:57

## 2023-06-22 RX ADMIN — METHOCARBAMOL 750 MILLIGRAM(S): 500 TABLET, FILM COATED ORAL at 13:21

## 2023-06-22 RX ADMIN — QUETIAPINE FUMARATE 300 MILLIGRAM(S): 200 TABLET, FILM COATED ORAL at 21:24

## 2023-06-22 RX ADMIN — QUETIAPINE FUMARATE 100 MILLIGRAM(S): 200 TABLET, FILM COATED ORAL at 11:43

## 2023-06-22 RX ADMIN — METHOCARBAMOL 750 MILLIGRAM(S): 500 TABLET, FILM COATED ORAL at 05:36

## 2023-06-22 RX ADMIN — Medication 3 MILLIGRAM(S): at 21:35

## 2023-06-22 RX ADMIN — BENZOCAINE AND MENTHOL 1 LOZENGE: 5; 1 LIQUID ORAL at 21:24

## 2023-06-22 RX ADMIN — MAGNESIUM OXIDE 400 MG ORAL TABLET 400 MILLIGRAM(S): 241.3 TABLET ORAL at 17:14

## 2023-06-22 RX ADMIN — MAGNESIUM OXIDE 400 MG ORAL TABLET 400 MILLIGRAM(S): 241.3 TABLET ORAL at 11:44

## 2023-06-22 RX ADMIN — METHOCARBAMOL 750 MILLIGRAM(S): 500 TABLET, FILM COATED ORAL at 21:22

## 2023-06-22 RX ADMIN — PREGABALIN 1000 MICROGRAM(S): 225 CAPSULE ORAL at 11:44

## 2023-06-22 NOTE — PROGRESS NOTE ADULT - PROBLEM SELECTOR PLAN 1
- Reportedly takes Percocet PRN, Lyrica, gabapentin, and Baclofen PRN.   - MR T/L-spine 9/2/2022: No cord or cauda equina compression. No abnormal cord signal or enhancement  - x-ray of lumbosacral spine on 6/15/23 reviewed which was essentially normal  - will d/c gabapentin per pt request as he does not feel it is helping with his pain  - pt reports anaphylaxis with Toradol, will avoid NSAIDs  - PT eval- rehab   - pain management recs appreciated - methocarbomol 750 q8, lidocaine patch daily, s/p Tylenol 650 standing x 2 days  - Utox with cocaine and THC   - increase lyrica 100 BID

## 2023-06-22 NOTE — PROGRESS NOTE ADULT - SUBJECTIVE AND OBJECTIVE BOX
[facility] Division of Hospital Medicine  Yunior Sorensen MD  Pager (M-F, 8A-5P): [phone]  Other Times:  [phone]    Patient is a 33y old  Male who presents with a chief complaint of Back pain and lower leg weakness (21 Jun 2023 12:18)      SUBJECTIVE / OVERNIGHT EVENTS: feels well no complaints   ADDITIONAL REVIEW OF SYSTEMS: denies CP/SOB    MEDICATIONS  (STANDING):  bictegravir 50 mG/emtricitabine 200 mG/tenofovir alafenamide 25 mG (BIKTARVY) 1 Tablet(s) Oral daily  cyanocobalamin 1000 MICROGram(s) Oral daily  enoxaparin Injectable 40 milliGRAM(s) SubCutaneous every 24 hours  lidocaine   4% Patch 1 Patch Transdermal daily  magnesium oxide 400 milliGRAM(s) Oral three times a day with meals  methocarbamol 750 milliGRAM(s) Oral every 8 hours  nicotine -  14 mG/24Hr(s) Patch 1 Patch Transdermal daily  pregabalin 100 milliGRAM(s) Oral two times a day  QUEtiapine 100 milliGRAM(s) Oral daily  QUEtiapine 300 milliGRAM(s) Oral at bedtime  trimethoprim  160 mG/sulfamethoxazole 800 mG 1 Tablet(s) Oral <User Schedule>    MEDICATIONS  (PRN):  benzocaine/menthol Lozenge 1 Lozenge Oral every 3 hours PRN Sore Throat  melatonin 3 milliGRAM(s) Oral at bedtime PRN Insomnia      CAPILLARY BLOOD GLUCOSE        I&O's Summary      PHYSICAL EXAM:  Vital Signs Last 24 Hrs  T(C): 36.7 (22 Jun 2023 04:21), Max: 37.1 (21 Jun 2023 20:21)  T(F): 98 (22 Jun 2023 04:21), Max: 98.8 (21 Jun 2023 20:21)  HR: 76 (22 Jun 2023 04:21) (65 - 85)  BP: 126/81 (22 Jun 2023 04:21) (116/61 - 139/75)  BP(mean): --  RR: 16 (22 Jun 2023 04:21) (16 - 18)  SpO2: 100% (22 Jun 2023 04:21) (100% - 100%)    Parameters below as of 22 Jun 2023 04:21  Patient On (Oxygen Delivery Method): room air    CONSTITUTIONAL: NAD  EYES: conjunctiva and sclera clear  RESPIRATORY: Normal respiratory effort; lungs are clear to auscultation bilaterally  CARDIOVASCULAR: Regular rate and rhythm, normal S1 and S2, no murmur/rub/gallop;   ABDOMEN: Nontender to palpation, normoactive bowel sounds, no rebound/guarding;   MUSCULOSKELETAL: no joint swelling or tenderness to palpation  PSYCH: A+O to person, place, and time; affect appropriate  NEUROLOGY: RT LE 4/5; LT 3-4/5      LABS:                        11.7   3.88  )-----------( 191      ( 22 Jun 2023 06:10 )             37.2     06-22    139  |  104  |  14  ----------------------------<  108<H>  3.9   |  22  |  0.97    Ca    8.7      22 Jun 2023 06:10  Phos  3.7     06-22  Mg     1.50     06-22            Urinalysis Basic - ( 22 Jun 2023 06:10 )    Color: x / Appearance: x / SG: x / pH: x  Gluc: 108 mg/dL / Ketone: x  / Bili: x / Urobili: x   Blood: x / Protein: x / Nitrite: x   Leuk Esterase: x / RBC: x / WBC x   Sq Epi: x / Non Sq Epi: x / Bacteria: x          RADIOLOGY & ADDITIONAL TESTS:  Results Reviewed:   Imaging Personally Reviewed:  Electrocardiogram Personally Reviewed:    COORDINATION OF CARE:  Care Discussed with Consultants/Other Providers [Y/N]:  Prior or Outpatient Records Reviewed [Y/N]:

## 2023-06-22 NOTE — PROGRESS NOTE ADULT - SUBJECTIVE AND OBJECTIVE BOX
CC: Patient is a 33y old  Male who presents with a chief complaint of Back pain and lower leg weakness (2023 10:49)    ID following for HIV management , LE weakness    Interval History/ROS: Patient with improvement in Le weakness. c/o throat pain. No fevers.    Rest of ROS negative.    Allergies  Toradol (Swelling)  Ceclor (Unknown)  Toradol (Anaphylaxis; Swelling)    ANTIMICROBIALS:  bictegravir 50 mG/emtricitabine 200 mG/tenofovir alafenamide 25 mG (BIKTARVY) 1 daily  fluconAZOLE   Tablet 400 every 24 hours  trimethoprim  160 mG/sulfamethoxazole 800 mG 1 <User Schedule>    OTHER MEDS:  benzocaine/menthol Lozenge 1 Lozenge Oral every 3 hours PRN  cyanocobalamin 1000 MICROGram(s) Oral daily  enoxaparin Injectable 40 milliGRAM(s) SubCutaneous every 24 hours  lidocaine   4% Patch 1 Patch Transdermal daily  magnesium oxide 400 milliGRAM(s) Oral three times a day with meals  melatonin 3 milliGRAM(s) Oral at bedtime PRN  methocarbamol 750 milliGRAM(s) Oral every 8 hours  nicotine -  14 mG/24Hr(s) Patch 1 Patch Transdermal daily  pregabalin 100 milliGRAM(s) Oral two times a day  QUEtiapine 100 milliGRAM(s) Oral daily  QUEtiapine 300 milliGRAM(s) Oral at bedtime    PE:    Vital Signs Last 24 Hrs  T(C): 36.9 (2023 16:12), Max: 37.1 (2023 20:21)  T(F): 98.4 (2023 16:12), Max: 98.8 (2023 20:21)  HR: 73 (2023 16:12) (68 - 85)  BP: 111/64 (2023 16:12) (111/64 - 139/75)  BP(mean): --  RR: 18 (2023 16:12) (16 - 18)  SpO2: 100% (2023 16:12) (100% - 100%)    Parameters below as of 2023 16:12  Patient On (Oxygen Delivery Method): room air    Gen: AOx3, NAD  CV: S1+S2 normal, no murmurs  Resp: Clear bilat, no resp distress  Abd: Soft, nontender, +BS  Ext: LE weakness improving, no wounds  : No Brizuela  IV/Skin: No thrombophlebitis  Neuro: no focal deficits    LABS:                          11.7   3.88  )-----------( 191      ( 2023 06:10 )             37.2           139  |  104  |  14  ----------------------------<  108<H>  3.9   |  22  |  0.97    Ca    8.7      2023 06:10  Phos  3.7       Mg     1.50             Urinalysis Basic - ( 2023 06:10 )    Color: x / Appearance: x / SG: x / pH: x  Gluc: 108 mg/dL / Ketone: x  / Bili: x / Urobili: x   Blood: x / Protein: x / Nitrite: x   Leuk Esterase: x / RBC: x / WBC x   Sq Epi: x / Non Sq Epi: x / Bacteria: x        MICROBIOLOGY:  v    HIV-1 RNA Quantitative, Viral Load Lo.89 (23 @ 06:30)  HIV-1 RNA Quantitative, Viral Load Lo.95 (23 @ 05:30)  HIV-1 RNA Quantitative, Viral Load Lo.59 (22 @ 06:26)  HIV-1 RNA Quantitative, Viral Load Lo.71 (22 @ 05:52)  HIV-1 RNA Quantitative, Viral Load Lo.1 (22 @ 11:06)  HIV-1 RNA Quantitative, Viral Load Lo.93 (22 @ 08:36)  HIV-1 RNA Quantitative, Viral Load Lo.42 (22 @ 05:40)    RADIOLOGY:    < from: Xray Lumbosacral Spine (06.15.23 @ 07:27) >  IMPRESSION:   No acute radiographic lumbar spine osseous pathology.  If back pain has markedly increased compared to initial MR 2022,   follow-up MR suggested to exclude increased disc herniation.    < end of copied text >

## 2023-06-23 DIAGNOSIS — B37.0 CANDIDAL STOMATITIS: ICD-10-CM

## 2023-06-23 LAB
ANION GAP SERPL CALC-SCNC: 11 MMOL/L — SIGNIFICANT CHANGE UP (ref 7–14)
B PERT DNA SPEC QL NAA+PROBE: SIGNIFICANT CHANGE UP
B PERT+PARAPERT DNA PNL SPEC NAA+PROBE: SIGNIFICANT CHANGE UP
BORDETELLA PARAPERTUSSIS (RAPRVP): SIGNIFICANT CHANGE UP
BUN SERPL-MCNC: 14 MG/DL — SIGNIFICANT CHANGE UP (ref 7–23)
C PNEUM DNA SPEC QL NAA+PROBE: SIGNIFICANT CHANGE UP
CALCIUM SERPL-MCNC: 9 MG/DL — SIGNIFICANT CHANGE UP (ref 8.4–10.5)
CHLORIDE SERPL-SCNC: 104 MMOL/L — SIGNIFICANT CHANGE UP (ref 98–107)
CO2 SERPL-SCNC: 24 MMOL/L — SIGNIFICANT CHANGE UP (ref 22–31)
CREAT SERPL-MCNC: 0.83 MG/DL — SIGNIFICANT CHANGE UP (ref 0.5–1.3)
EGFR: 118 ML/MIN/1.73M2 — SIGNIFICANT CHANGE UP
FLUAV SUBTYP SPEC NAA+PROBE: SIGNIFICANT CHANGE UP
FLUBV RNA SPEC QL NAA+PROBE: SIGNIFICANT CHANGE UP
GLUCOSE SERPL-MCNC: 91 MG/DL — SIGNIFICANT CHANGE UP (ref 70–99)
HADV DNA SPEC QL NAA+PROBE: SIGNIFICANT CHANGE UP
HCOV 229E RNA SPEC QL NAA+PROBE: SIGNIFICANT CHANGE UP
HCOV HKU1 RNA SPEC QL NAA+PROBE: SIGNIFICANT CHANGE UP
HCOV NL63 RNA SPEC QL NAA+PROBE: SIGNIFICANT CHANGE UP
HCOV OC43 RNA SPEC QL NAA+PROBE: SIGNIFICANT CHANGE UP
HMPV RNA SPEC QL NAA+PROBE: SIGNIFICANT CHANGE UP
HPIV1 RNA SPEC QL NAA+PROBE: SIGNIFICANT CHANGE UP
HPIV2 RNA SPEC QL NAA+PROBE: SIGNIFICANT CHANGE UP
HPIV3 RNA SPEC QL NAA+PROBE: SIGNIFICANT CHANGE UP
HPIV4 RNA SPEC QL NAA+PROBE: SIGNIFICANT CHANGE UP
M PNEUMO DNA SPEC QL NAA+PROBE: SIGNIFICANT CHANGE UP
MAGNESIUM SERPL-MCNC: 1.7 MG/DL — SIGNIFICANT CHANGE UP (ref 1.6–2.6)
PHOSPHATE SERPL-MCNC: 3.4 MG/DL — SIGNIFICANT CHANGE UP (ref 2.5–4.5)
POTASSIUM SERPL-MCNC: 4.2 MMOL/L — SIGNIFICANT CHANGE UP (ref 3.5–5.3)
POTASSIUM SERPL-SCNC: 4.2 MMOL/L — SIGNIFICANT CHANGE UP (ref 3.5–5.3)
RAPID RVP RESULT: SIGNIFICANT CHANGE UP
RSV RNA SPEC QL NAA+PROBE: SIGNIFICANT CHANGE UP
RV+EV RNA SPEC QL NAA+PROBE: SIGNIFICANT CHANGE UP
SARS-COV-2 RNA SPEC QL NAA+PROBE: SIGNIFICANT CHANGE UP
SODIUM SERPL-SCNC: 139 MMOL/L — SIGNIFICANT CHANGE UP (ref 135–145)

## 2023-06-23 PROCEDURE — 99232 SBSQ HOSP IP/OBS MODERATE 35: CPT

## 2023-06-23 RX ORDER — SODIUM CHLORIDE 0.65 %
1 AEROSOL, SPRAY (ML) NASAL
Refills: 0 | Status: DISCONTINUED | OUTPATIENT
Start: 2023-06-23 | End: 2023-06-24

## 2023-06-23 RX ORDER — ACETAMINOPHEN 500 MG
650 TABLET ORAL ONCE
Refills: 0 | Status: COMPLETED | OUTPATIENT
Start: 2023-06-23 | End: 2023-06-23

## 2023-06-23 RX ORDER — FLUTICASONE PROPIONATE 50 MCG
1 SPRAY, SUSPENSION NASAL
Refills: 0 | Status: DISCONTINUED | OUTPATIENT
Start: 2023-06-23 | End: 2023-06-24

## 2023-06-23 RX ADMIN — PREGABALIN 1000 MICROGRAM(S): 225 CAPSULE ORAL at 11:07

## 2023-06-23 RX ADMIN — BENZOCAINE AND MENTHOL 1 LOZENGE: 5; 1 LIQUID ORAL at 17:14

## 2023-06-23 RX ADMIN — Medication 100 MILLIGRAM(S): at 17:14

## 2023-06-23 RX ADMIN — BENZOCAINE AND MENTHOL 1 LOZENGE: 5; 1 LIQUID ORAL at 11:07

## 2023-06-23 RX ADMIN — FLUCONAZOLE 400 MILLIGRAM(S): 150 TABLET ORAL at 17:14

## 2023-06-23 RX ADMIN — QUETIAPINE FUMARATE 300 MILLIGRAM(S): 200 TABLET, FILM COATED ORAL at 22:26

## 2023-06-23 RX ADMIN — Medication 1 SPRAY(S): at 17:48

## 2023-06-23 RX ADMIN — Medication 650 MILLIGRAM(S): at 20:52

## 2023-06-23 RX ADMIN — METHOCARBAMOL 750 MILLIGRAM(S): 500 TABLET, FILM COATED ORAL at 05:18

## 2023-06-23 RX ADMIN — Medication 650 MILLIGRAM(S): at 21:44

## 2023-06-23 RX ADMIN — Medication 3 MILLIGRAM(S): at 22:27

## 2023-06-23 RX ADMIN — METHOCARBAMOL 750 MILLIGRAM(S): 500 TABLET, FILM COATED ORAL at 22:26

## 2023-06-23 RX ADMIN — BENZOCAINE AND MENTHOL 1 LOZENGE: 5; 1 LIQUID ORAL at 20:53

## 2023-06-23 RX ADMIN — Medication 100 MILLIGRAM(S): at 05:19

## 2023-06-23 RX ADMIN — Medication 975 MILLIGRAM(S): at 00:54

## 2023-06-23 RX ADMIN — QUETIAPINE FUMARATE 100 MILLIGRAM(S): 200 TABLET, FILM COATED ORAL at 11:07

## 2023-06-23 RX ADMIN — Medication 1 TABLET(S): at 05:19

## 2023-06-23 RX ADMIN — BENZOCAINE AND MENTHOL 1 LOZENGE: 5; 1 LIQUID ORAL at 04:14

## 2023-06-23 RX ADMIN — BICTEGRAVIR SODIUM, EMTRICITABINE, AND TENOFOVIR ALAFENAMIDE FUMARATE 1 TABLET(S): 30; 120; 15 TABLET ORAL at 11:07

## 2023-06-23 RX ADMIN — METHOCARBAMOL 750 MILLIGRAM(S): 500 TABLET, FILM COATED ORAL at 14:34

## 2023-06-23 NOTE — PROGRESS NOTE ADULT - PROBLEM SELECTOR PLAN 2
Chronic per patient since being diagnosed with GBS/HIV myelopathy  - low suspicion for acute neurologic process/cord compression  - PT eval- rehab  - Neuro appreciated- no need for further imaging; suspect HIV myelopathy given poss non compliance with meds and elevated viral load   - B12 -349 ,  homocysteine- normal, folate- normal , CK- normal; MMA- in lab  - neuro does not think b-12 contributing but keep above 400; on b12 supplementation

## 2023-06-23 NOTE — PROGRESS NOTE ADULT - PROBLEM SELECTOR PROBLEM 1
Chronic back pain
Chronic back pain
Oral thrush
Chronic back pain

## 2023-06-23 NOTE — PROGRESS NOTE ADULT - PROBLEM SELECTOR PLAN 5
DVT ppx: Lovenox  Dispo : rehab    medically stable for rehab
DVT ppx: Lovenox  Dispo : rehab
DVT ppx: Lovenox  Dispo : rehab    medically stable for rehab
- reviewed. [facility] Reference #: [id number] last opiod rx 5/30 4 days supply
DVT ppx: Lovenox  Dispo : rehab

## 2023-06-23 NOTE — PROGRESS NOTE ADULT - SUBJECTIVE AND OBJECTIVE BOX
CC: Patient is a 33y old  Male who presents with a chief complaint of Back pain and lower leg weakness (2023 10:08)    ID following for HIV management. LE weakness.    Interval History/ROS: Patient remains with throat pain. Able to ambulate to the bathroom.     Rest of ROS negative.    Allergies  Toradol (Swelling)  Ceclor (Unknown)  Toradol (Anaphylaxis; Swelling)    ANTIMICROBIALS:  bictegravir 50 mG/emtricitabine 200 mG/tenofovir alafenamide 25 mG (BIKTARVY) 1 daily  fluconAZOLE   Tablet 400 every 24 hours  trimethoprim  160 mG/sulfamethoxazole 800 mG 1 <User Schedule>    OTHER MEDS:  benzocaine/menthol Lozenge 1 Lozenge Oral every 3 hours PRN  cyanocobalamin 1000 MICROGram(s) Oral daily  enoxaparin Injectable 40 milliGRAM(s) SubCutaneous every 24 hours  lidocaine   4% Patch 1 Patch Transdermal daily  melatonin 3 milliGRAM(s) Oral at bedtime PRN  methocarbamol 750 milliGRAM(s) Oral every 8 hours  nicotine -  14 mG/24Hr(s) Patch 1 Patch Transdermal daily  pregabalin 100 milliGRAM(s) Oral two times a day  QUEtiapine 100 milliGRAM(s) Oral daily  QUEtiapine 300 milliGRAM(s) Oral at bedtime    PE:    Vital Signs Last 24 Hrs  T(C): 36.7 (2023 05:45), Max: 36.9 (2023 16:12)  T(F): 98 (2023 05:45), Max: 98.4 (2023 16:12)  HR: 75 (2023 05:45) (68 - 77)  BP: 99/61 (2023 05:45) (99/61 - 117/62)  BP(mean): --  RR: 17 (2023 05:45) (17 - 18)  SpO2: 100% (2023 05:45) (100% - 100%)    Parameters below as of 2023 05:45  Patient On (Oxygen Delivery Method): room air    Gen: AOx3, NAD  HEENT: oral thrush better  CV: S1+S2 normal, no murmurs  Resp: Clear bilat, no resp distress  Abd: Soft, nontender, +BS  Ext: LE weakness improving, L>R  : No Brizuela  IV/Skin: No thrombophlebitis  Neuro: no focal deficits    LABS:                          11.7   3.88  )-----------( 191      ( 2023 06:10 )             37.2           139  |  104  |  14  ----------------------------<  91  4.2   |  24  |  0.83    Ca    9.0      2023 06:40  Phos  3.4       Mg     1.70         Urinalysis Basic - ( 2023 06:40 )    Color: x / Appearance: x / SG: x / pH: x  Gluc: 91 mg/dL / Ketone: x  / Bili: x / Urobili: x   Blood: x / Protein: x / Nitrite: x   Leuk Esterase: x / RBC: x / WBC x   Sq Epi: x / Non Sq Epi: x / Bacteria: x    MICROBIOLOGY:  v    HIV-1 RNA Quantitative, Viral Load Lo.89 (23 @ 06:30)  HIV-1 RNA Quantitative, Viral Load Lo.95 (23 @ 05:30)  HIV-1 RNA Quantitative, Viral Load Lo.59 (22 @ 06:26)  HIV-1 RNA Quantitative, Viral Load Lo.71 (22 @ 05:52)  HIV-1 RNA Quantitative, Viral Load Lo.1 (22 @ 11:06)  HIV-1 RNA Quantitative, Viral Load Lo.93 (22 @ 08:36)  HIV-1 RNA Quantitative, Viral Load Lo.42 (22 @ 05:40)    RADIOLOGY:    < from: Xray Lumbosacral Spine (06.15.23 @ 07:27) >  IMPRESSION:   No acute radiographic lumbar spine osseous pathology.  If back pain has markedly increased compared to initial MR 2022,   follow-up MR suggested to exclude increased disc herniation.    < end of copied text >

## 2023-06-23 NOTE — PROGRESS NOTE ADULT - ASSESSMENT
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 Has multiple hospital visits for similar symptoms. Last MRI was in 9/2022 with no cord or cauda equina compression. No abnormal cord signal or enhancement. No fevers. Leukopenia. CD4 157 (11%), HIV VL pending. States noncompliant with Biktarvy, and has not been taking bactrim. Today with oral thrush on exam and now with odynophagia. L-spine XR here unremarkable.    Recommend:  #HIV  -Continue Biktarvy  -Genotype ordered for the am  -Continue bactrim 1 ds tab po 3 x weekly  -Stressed importance of adherence to ART  -Please change magox to IV magnesium - oral magnesium interacts with Biktarvy    #Oral thrush, odynophagia possible candidal esophagitis  -Start fluconazole 400 mg PO daily    #LBP  -Pt states has been chronic since developing GBS from the influenza vaccination in the past  -In the past was suspected also a component of HIV myelopathy  -States pain and weakness is intermittent, improved on exam    -Pain management  -Neuro following    Easton Hunter MD  Available through MS Teams  If no response, or after 5pm/weekends, call [phone]    Plan discussed with primary team.    
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 Has multiple hospital visits for similar symptoms. Last MRI was in 9/2022 with no cord or cauda equina compression. No abnormal cord signal or enhancement. No fevers. Leukopenia. CD4 157 (11%), HIV VL pending. Today, states noncompliant with Biktarvy, and has not been taking bactrim. No oral thrush on exam. L-spine XR here unremarkable.    Recommend:  #HIV  -Continue Biktarvy  -Genotype ordered for the am  -Continue bactrim 1 ds tab po 3 x weekly  -Stressed importance of adherence to ART    #LBP  -Pt states has been chronic since developing GBS from the influenza vaccination in the past  -In the past was suspected also a component of HIV myelopathy  -States pain and weakness is intermittent, today slightly better on exam    -Pain management  -Neuro following    Easton Hunter MD  Available through MS Teams  If no response, or after 5pm/weekends, call [phone]      
34 yo M w/ Hx HIV on Biktarvy, GBS?s/p influenza vaccination,/HIV myelopathy, substance abuse hx chronic lower back pain and lower extremity weakness presenting with back pain and leg weakness.
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 Has multiple hospital visits for similar symptoms. Last MRI was in 9/2022 with no cord or cauda equina compression. No abnormal cord signal or enhancement. No fevers. Leukopenia. CD4 157 (11%), HIV VL pending. States noncompliant with Biktarvy, and has not been taking bactrim. Oral thrush on exam and now with odynophagia. L-spine XR here unremarkable.    Recommend:  #HIV  -Continue Biktarvy  -Genotype in lab  -Continue bactrim 1 ds tab po 3 x weekly  -Stressed importance of adherence to ART    #Oral thrush, odynophagia possible candidal esophagitis  -Fluconazole 400 mg PO daily    #LBP  -Pt states has been chronic since developing GBS from the influenza vaccination in the past  -In the past was suspected also a component of HIV myelopathy  -States pain and weakness is intermittent, improved on exam    -Pain management  -Neuro following    Easton Hunter MD  Available through MS Teams  If no response, or after 5pm/weekends, call [phone]    Plan discussed with primary team.    
32 yo M w/ Hx HIV on Biktarvy, GBS?s/p influenza vaccination,/HIV myelopathy, substance abuse hx chronic lower back pain and lower extremity weakness presenting with back pain and leg weakness.
34 yo M w/ Hx HIV on Biktarvy, GBS?s/p influenza vaccination,/HIV myelopathy, substance abuse hx chronic lower back pain and lower extremity weakness presenting with back pain and leg weakness.
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
32 yo M w/ Hx HIV on Biktarvy, GBS?s/p influenza vaccination,/HIV myelopathy, substance abuse hx chronic lower back pain and lower extremity weakness presenting with back pain and leg weakness.
32 yo M w/ Hx HIV on Biktarvy, GBS?s/p influenza vaccination,/HIV myelopathy, substance abuse hx chronic lower back pain and lower extremity weakness presenting with back pain and leg weakness.

## 2023-06-23 NOTE — PROGRESS NOTE ADULT - PROBLEM SELECTOR PROBLEM 5
Need for prophylactic measure
Medication management

## 2023-06-23 NOTE — PROGRESS NOTE ADULT - SUBJECTIVE AND OBJECTIVE BOX
[facility] Division of Hospital Medicine  Yunior Sorensen MD  Pager (M-F, 8A-5P): [phone]  Other Times:  [phone]    Patient is a 33y old  Male who presents with a chief complaint of Back pain and lower leg weakness (22 Jun 2023 16:53)      SUBJECTIVE / OVERNIGHT EVENTS: noted thrush; no overt events ON; no SOB/CP    MEDICATIONS  (STANDING):  bictegravir 50 mG/emtricitabine 200 mG/tenofovir alafenamide 25 mG (BIKTARVY) 1 Tablet(s) Oral daily  cyanocobalamin 1000 MICROGram(s) Oral daily  enoxaparin Injectable 40 milliGRAM(s) SubCutaneous every 24 hours  fluconAZOLE   Tablet 400 milliGRAM(s) Oral every 24 hours  lidocaine   4% Patch 1 Patch Transdermal daily  methocarbamol 750 milliGRAM(s) Oral every 8 hours  nicotine -  14 mG/24Hr(s) Patch 1 Patch Transdermal daily  pregabalin 100 milliGRAM(s) Oral two times a day  QUEtiapine 100 milliGRAM(s) Oral daily  QUEtiapine 300 milliGRAM(s) Oral at bedtime  trimethoprim  160 mG/sulfamethoxazole 800 mG 1 Tablet(s) Oral <User Schedule>    MEDICATIONS  (PRN):  benzocaine/menthol Lozenge 1 Lozenge Oral every 3 hours PRN Sore Throat  melatonin 3 milliGRAM(s) Oral at bedtime PRN Insomnia      CAPILLARY BLOOD GLUCOSE        I&O's Summary      PHYSICAL EXAM:  Vital Signs Last 24 Hrs  T(C): 36.7 (23 Jun 2023 05:45), Max: 36.9 (22 Jun 2023 16:12)  T(F): 98 (23 Jun 2023 05:45), Max: 98.4 (22 Jun 2023 16:12)  HR: 75 (23 Jun 2023 05:45) (68 - 77)  BP: 99/61 (23 Jun 2023 05:45) (99/61 - 117/62)  BP(mean): --  RR: 17 (23 Jun 2023 05:45) (17 - 18)  SpO2: 100% (23 Jun 2023 05:45) (100% - 100%)    Parameters below as of 23 Jun 2023 05:45  Patient On (Oxygen Delivery Method): room air    CONSTITUTIONAL: NAD  EYES: conjunctiva and sclera clear  RESPIRATORY: Normal respiratory effort; lungs are clear to auscultation bilaterally  CARDIOVASCULAR: Regular rate and rhythm, normal S1 and S2, no murmur/rub/gallop;   ABDOMEN: Nontender to palpation, normoactive bowel sounds, no rebound/guarding;   MUSCULOSKELETAL: no joint swelling or tenderness to palpation  PSYCH: A+O to person, place, and time; affect appropriate  NEUROLOGY: RT LE 4/5; LT 3-4/5    LABS:                        11.7   3.88  )-----------( 191      ( 22 Jun 2023 06:10 )             37.2     06-23    139  |  104  |  14  ----------------------------<  91  4.2   |  24  |  0.83    Ca    9.0      23 Jun 2023 06:40  Phos  3.4     06-23  Mg     1.70     06-23            Urinalysis Basic - ( 23 Jun 2023 06:40 )    Color: x / Appearance: x / SG: x / pH: x  Gluc: 91 mg/dL / Ketone: x  / Bili: x / Urobili: x   Blood: x / Protein: x / Nitrite: x   Leuk Esterase: x / RBC: x / WBC x   Sq Epi: x / Non Sq Epi: x / Bacteria: x          RADIOLOGY & ADDITIONAL TESTS:  Results Reviewed:   Imaging Personally Reviewed:  Electrocardiogram Personally Reviewed:    COORDINATION OF CARE:  Care Discussed with Consultants/Other Providers [Y/N]:  Prior or Outpatient Records Reviewed [Y/N]:   [facility] Division of Hospital Medicine  Yunior Sorensen MD  Pager (M-F, 8A-5P): [phone]  Other Times:  [phone]    Patient is a 33y old  Male who presents with a chief complaint of Back pain and lower leg weakness (22 Jun 2023 16:53)      SUBJECTIVE / OVERNIGHT EVENTS: noted thrush; no overt events ON; no SOB/CP    MEDICATIONS  (STANDING):  bictegravir 50 mG/emtricitabine 200 mG/tenofovir alafenamide 25 mG (BIKTARVY) 1 Tablet(s) Oral daily  cyanocobalamin 1000 MICROGram(s) Oral daily  enoxaparin Injectable 40 milliGRAM(s) SubCutaneous every 24 hours  fluconAZOLE   Tablet 400 milliGRAM(s) Oral every 24 hours  lidocaine   4% Patch 1 Patch Transdermal daily  methocarbamol 750 milliGRAM(s) Oral every 8 hours  nicotine -  14 mG/24Hr(s) Patch 1 Patch Transdermal daily  pregabalin 100 milliGRAM(s) Oral two times a day  QUEtiapine 100 milliGRAM(s) Oral daily  QUEtiapine 300 milliGRAM(s) Oral at bedtime  trimethoprim  160 mG/sulfamethoxazole 800 mG 1 Tablet(s) Oral <User Schedule>    MEDICATIONS  (PRN):  benzocaine/menthol Lozenge 1 Lozenge Oral every 3 hours PRN Sore Throat  melatonin 3 milliGRAM(s) Oral at bedtime PRN Insomnia      CAPILLARY BLOOD GLUCOSE        I&O's Summary      PHYSICAL EXAM:  Vital Signs Last 24 Hrs  T(C): 36.7 (23 Jun 2023 05:45), Max: 36.9 (22 Jun 2023 16:12)  T(F): 98 (23 Jun 2023 05:45), Max: 98.4 (22 Jun 2023 16:12)  HR: 75 (23 Jun 2023 05:45) (68 - 77)  BP: 99/61 (23 Jun 2023 05:45) (99/61 - 117/62)  BP(mean): --  RR: 17 (23 Jun 2023 05:45) (17 - 18)  SpO2: 100% (23 Jun 2023 05:45) (100% - 100%)    Parameters below as of 23 Jun 2023 05:45  Patient On (Oxygen Delivery Method): room air    CONSTITUTIONAL: NAD  EYES: conjunctiva and sclera clear  RESPIRATORY: Normal respiratory effort; lungs are clear to auscultation bilaterally  CARDIOVASCULAR: Regular rate and rhythm, normal S1 and S2, no murmur/rub/gallop;   ABDOMEN: Nontender to palpation, normoactive bowel sounds, no rebound/guarding;   MUSCULOSKELETAL: no joint swelling or tenderness to palpation  PSYCH: A+O to person, place, and time; affect appropriate  NEUROLOGY: RT LE 4/5; LT 3-4/5    LABS:                        11.7   3.88  )-----------( 191      ( 22 Jun 2023 06:10 )             37.2     06-23    139  |  104  |  14  ----------------------------<  91  4.2   |  24  |  0.83    Ca    9.0      23 Jun 2023 06:40  Phos  3.4     06-23  Mg     1.70     06-23            Urinalysis Basic - ( 23 Jun 2023 06:40 )    Color: x / Appearance: x / SG: x / pH: x  Gluc: 91 mg/dL / Ketone: x  / Bili: x / Urobili: x   Blood: x / Protein: x / Nitrite: x   Leuk Esterase: x / RBC: x / WBC x   Sq Epi: x / Non Sq Epi: x / Bacteria: x          RADIOLOGY & ADDITIONAL TESTS:  Results Reviewed:   Imaging Personally Reviewed:  Electrocardiogram Personally Reviewed:    COORDINATION OF CARE:  Care Discussed with Consultants/Other Providers [Y/N]:  Prior or Outpatient Records Reviewed [Y/N]:

## 2023-06-23 NOTE — PROGRESS NOTE ADULT - PROVIDER SPECIALTY LIST ADULT
Hospitalist
Infectious Disease
Hospitalist

## 2023-06-23 NOTE — PROGRESS NOTE ADULT - PROBLEM SELECTOR PLAN 3
- Reportedly takes Percocet PRN, Lyrica, gabapentin, and Baclofen PRN.   - MR T/L-spine 9/2/2022: No cord or cauda equina compression. No abnormal cord signal or enhancement  - x-ray of lumbosacral spine on 6/15/23 reviewed which was essentially normal  - will d/c gabapentin per pt request as he does not feel it is helping with his pain  - pt reports anaphylaxis with Toradol, will avoid NSAIDs  - PT eval- rehab   - pain management recs appreciated - methocarbomol 750 q8, lidocaine patch daily, s/p Tylenol 650 standing x 2 days  - Utox with cocaine and THC   - c/w lyrica 100 BID

## 2023-06-23 NOTE — PROGRESS NOTE ADULT - REASON FOR ADMISSION
Back pain and lower leg weakness

## 2023-06-23 NOTE — PROGRESS NOTE ADULT - PROBLEM SELECTOR PLAN 4
- reviewed. [facility] Reference #: [id number] last opiod rx 5/30 4 days supply
C/w Biktarvy  - CD4 count in December 2022 was 145, viral load still detectable in January 2023  - On Bactrim  - repeat CD4 157; VL 775k  - HIV genotype in lab
- f/u home meds from pharmacy
DVT ppx: Lovenox  Dispo pending PT eval
- reviewed. [facility] Reference #: [id number] last opiod rx 5/30 4 days supply
- f/u home meds from pharmacy. Med rec pharmacist emailed. [facility] Reference #: [id number] has not been prescribed baclofen on [facility].

## 2023-06-23 NOTE — PROGRESS NOTE ADULT - PROBLEM SELECTOR PROBLEM 4
Medication management
HIV disease
Medication management
Need for prophylactic measure

## 2023-06-24 ENCOUNTER — TRANSCRIPTION ENCOUNTER (OUTPATIENT)
Age: 33
End: 2023-06-24

## 2023-06-24 VITALS
HEART RATE: 66 BPM | SYSTOLIC BLOOD PRESSURE: 111 MMHG | RESPIRATION RATE: 18 BRPM | DIASTOLIC BLOOD PRESSURE: 68 MMHG | TEMPERATURE: 98 F | OXYGEN SATURATION: 99 %

## 2023-06-24 LAB
ANION GAP SERPL CALC-SCNC: 12 MMOL/L — SIGNIFICANT CHANGE UP (ref 7–14)
BUN SERPL-MCNC: 15 MG/DL — SIGNIFICANT CHANGE UP (ref 7–23)
CALCIUM SERPL-MCNC: 9.4 MG/DL — SIGNIFICANT CHANGE UP (ref 8.4–10.5)
CHLORIDE SERPL-SCNC: 106 MMOL/L — SIGNIFICANT CHANGE UP (ref 98–107)
CO2 SERPL-SCNC: 22 MMOL/L — SIGNIFICANT CHANGE UP (ref 22–31)
CREAT SERPL-MCNC: 0.8 MG/DL — SIGNIFICANT CHANGE UP (ref 0.5–1.3)
EGFR: 119 ML/MIN/1.73M2 — SIGNIFICANT CHANGE UP
GLUCOSE SERPL-MCNC: 87 MG/DL — SIGNIFICANT CHANGE UP (ref 70–99)
MAGNESIUM SERPL-MCNC: 1.6 MG/DL — SIGNIFICANT CHANGE UP (ref 1.6–2.6)
METHYLMALONATE SERPL-SCNC: 113 NMOL/L — SIGNIFICANT CHANGE UP (ref 0–378)
PHOSPHATE SERPL-MCNC: 3.7 MG/DL — SIGNIFICANT CHANGE UP (ref 2.5–4.5)
POTASSIUM SERPL-MCNC: 4.4 MMOL/L — SIGNIFICANT CHANGE UP (ref 3.5–5.3)
POTASSIUM SERPL-SCNC: 4.4 MMOL/L — SIGNIFICANT CHANGE UP (ref 3.5–5.3)
SODIUM SERPL-SCNC: 140 MMOL/L — SIGNIFICANT CHANGE UP (ref 135–145)

## 2023-06-24 RX ORDER — IBUPROFEN 200 MG
1 TABLET ORAL
Refills: 0 | DISCHARGE

## 2023-06-24 RX ORDER — GABAPENTIN 400 MG/1
1 CAPSULE ORAL
Refills: 0 | DISCHARGE

## 2023-06-24 RX ORDER — BICTEGRAVIR SODIUM, EMTRICITABINE, AND TENOFOVIR ALAFENAMIDE FUMARATE 30; 120; 15 MG/1; MG/1; MG/1
1 TABLET ORAL
Refills: 0 | DISCHARGE

## 2023-06-24 RX ORDER — QUETIAPINE FUMARATE 200 MG/1
1 TABLET, FILM COATED ORAL
Refills: 0 | DISCHARGE

## 2023-06-24 RX ORDER — LIDOCAINE 4 G/100G
1 CREAM TOPICAL
Qty: 7 | Refills: 0
Start: 2023-06-24 | End: 2023-06-30

## 2023-06-24 RX ORDER — FLUCONAZOLE 150 MG/1
2 TABLET ORAL
Qty: 28 | Refills: 0
Start: 2023-06-24 | End: 2023-07-07

## 2023-06-24 RX ORDER — PREGABALIN 225 MG/1
1 CAPSULE ORAL
Qty: 30 | Refills: 0
Start: 2023-06-24 | End: 2023-07-23

## 2023-06-24 RX ORDER — BICTEGRAVIR SODIUM, EMTRICITABINE, AND TENOFOVIR ALAFENAMIDE FUMARATE 30; 120; 15 MG/1; MG/1; MG/1
1 TABLET ORAL
Qty: 30 | Refills: 0
Start: 2023-06-24 | End: 2023-07-23

## 2023-06-24 RX ORDER — QUETIAPINE FUMARATE 200 MG/1
1 TABLET, FILM COATED ORAL
Qty: 0 | Refills: 0 | DISCHARGE
Start: 2023-06-24

## 2023-06-24 RX ORDER — PREGABALIN 225 MG/1
1 CAPSULE ORAL
Refills: 0 | DISCHARGE

## 2023-06-24 RX ORDER — QUETIAPINE FUMARATE 200 MG/1
1 TABLET, FILM COATED ORAL
Refills: 0 | DISCHARGE
Start: 2023-06-24

## 2023-06-24 RX ADMIN — METHOCARBAMOL 750 MILLIGRAM(S): 500 TABLET, FILM COATED ORAL at 06:02

## 2023-06-24 RX ADMIN — BENZOCAINE AND MENTHOL 1 LOZENGE: 5; 1 LIQUID ORAL at 06:02

## 2023-06-24 RX ADMIN — Medication 1 SPRAY(S): at 06:02

## 2023-06-24 RX ADMIN — Medication 100 MILLIGRAM(S): at 06:02

## 2023-06-24 NOTE — DISCHARGE NOTE PROVIDER - PROVIDER TOKENS
PROVIDER:[TOKEN:[[id number]]],PROVIDER:[TOKEN:[[id number]]],PROVIDER:[TOKEN:[[id number]]]

## 2023-06-24 NOTE — DISCHARGE NOTE PROVIDER - NSDCMRMEDTOKEN_GEN_ALL_CORE_FT
The patient is at high risk for a choroidal neovascular membrane. NO CNV SEEN TODAY. We will watch closely for progression. baclofen 10 mg oral tablet: 1 tab(s) orally 3 times a day  bictegravir/emtricitabine/tenofovir 50 mg-200 mg-25 mg oral tablet: 1 tab(s) orally once a day MDD: 1 tablet  Biktarvy 50 mg-200 mg-25 mg oral tablet: 1 tab(s) orally once a day  cyanocobalamin 1000 mcg oral tablet: 1 tab(s) orally once a day  cyclobenzaprine 10 mg oral tablet: 1 tab(s) orally once a day  fluconazole 200 mg oral tablet: 2 tab(s) orally every 24 hours MDD: 400mg  gabapentin 400 mg oral capsule: 1 cap(s) orally 3 times a day (patient states this does not alleviate pain; therapeutic duplication with pregabalin)  melatonin 3 mg oral tablet: 1 tab(s) orally once a day (at bedtime), As needed, Insomnia  Percocet 5 mg-325 mg oral tablet: 1 tab(s) orally 4 times a day MDD: 4  pregabalin 100 mg oral capsule: 1 cap(s) orally 2 times a day MDD: 200mg  pregabalin 50 mg oral capsule: 1 cap(s) orally 3 times a day (per pharmacy, last dispensed 5/17/23 for a 3-day supply)  QUEtiapine 100 mg oral tablet: 1 tab(s) orally once a day (in the morning)  QUEtiapine 100 mg oral tablet: 1 tab(s) orally once a day  QUEtiapine 300 mg oral tablet: 1 tab(s) orally once a day (at bedtime)  QUEtiapine 300 mg oral tablet: 1 tab(s) orally once a day (at bedtime)  sulfamethoxazole-trimethoprim 800 mg-160 mg oral tablet: 1 tab(s) orally 3 times a week Monday, Wednesday, Friday  Vitamin B-12 1000 mcg oral tablet: 1 tab(s) orally once a day   baclofen 10 mg oral tablet: 1 tab(s) orally 3 times a day  bictegravir/emtricitabine/tenofovir 50 mg-200 mg-25 mg oral tablet: 1 tab(s) orally once a day MDD: 1 tablet  cyanocobalamin 1000 mcg oral tablet: 1 tab(s) orally once a day  cyclobenzaprine 10 mg oral tablet: 1 tab(s) orally once a day  fluconazole 200 mg oral tablet: 2 tab(s) orally every 24 hours MDD: 400mg  melatonin 3 mg oral tablet: 1 tab(s) orally once a day (at bedtime), As needed, Insomnia  pregabalin 100 mg oral capsule: 1 cap(s) orally 2 times a day MDD: 200mg  QUEtiapine 100 mg oral tablet: 1 tab(s) orally once a day  QUEtiapine 300 mg oral tablet: 1 tab(s) orally once a day (at bedtime)  sulfamethoxazole-trimethoprim 800 mg-160 mg oral tablet: 1 tab(s) orally 3 times a week Monday, Wednesday, Friday

## 2023-06-24 NOTE — DISCHARGE NOTE PROVIDER - DISCHARGE SERVICE FOR PATIENT
on the discharge service for the patient. I have reviewed and made amendments to the documentation where necessary.
Family informed/Warm blanket/Explanation of wait/Patient informed

## 2023-06-24 NOTE — DISCHARGE NOTE PROVIDER - HOSPITAL COURSE
34 yo M w/ Hx HIV on Biktarvy, GBS? s/p influenza vaccination, HIV myelopathy, substance abuse hx chronic lower back pain and lower extremity weakness presenting with back pain and leg weakness.    Oral thrush.   ·  Plan: - started on fluconazole.    Leg weakness.   ·  Plan: Chronic per patient since being diagnosed with GBS/HIV myelopathy  - low suspicion for acute neurologic process/cord compression  - PT eval- rehab  - Neuro appreciated- no need for further imaging; suspect HIV myelopathy given poss non compliance with meds and elevated viral load   - B12 -349 ,  homocysteine- normal, folate- normal , CK- normal; MMA- in lab  - neuro does not think b-12 contributing but keep above 400; on b12 supplementation.    Chronic back pain.   ·  Plan: - Reportedly takes Percocet PRN, Lyrica, gabapentin, and Baclofen PRN.   - MR T/L-spine 9/2/2022: No cord or cauda equina compression. No abnormal cord signal or enhancement  - x-ray of lumbosacral spine on 6/15/23 reviewed which was essentially normal  - will d/c gabapentin per pt request as he does not feel it is helping with his pain  - pt reports anaphylaxis with Toradol, will avoid NSAIDs  - PT eval- rehab   - pain management recs appreciated - methocarbomol 750 q8, lidocaine patch daily, s/p Tylenol 650 standing x 2 days  - Utox with cocaine and THC   - c/w lyrica 100 BID.    HIV disease.   ·  Plan: C/w Biktarvy  - CD4 count in December 2022 was 145, viral load still detectable in January 2023  - On Bactrim  - repeat CD4 157; VL 775k  - HIV genotype in lab.    Medication management.   ·  Plan: - reviewed. istop Reference #: [id number] last opiod rx 5/30 4 days supply.    Need for prophylactic measure.   ·  Plan: DVT ppx: Lovenox  Dispo : rehab    Pt was medically stable for rehab, awaiting insurance authorization. Called by nurse to evaluate patient who wishes to leave the hospital against medical advice. Patient is A&O x3 and has full capacity to make his own medical decisions. Pt was informed of their medical conditions, benefits, and alternatives to treatment as well as the risks of refusing treatment and the seriousness of leaving against medical advice such as the risks of heart attack, stroke, seizure, and even death were fully explained to the patient.  After expressing full understanding, patient then signs out against medical advice witnessed by the nurse.  Attending made aware.    32 yo M w/ Hx HIV on Biktarvy, GBS? s/p influenza vaccination, HIV myelopathy, substance abuse hx chronic lower back pain and lower extremity weakness presenting with back pain and leg weakness.    Oral thrush.   ·  Plan: - started on fluconazole.    Leg weakness.   ·  Plan: Chronic per patient since being diagnosed with GBS/HIV myelopathy  - low suspicion for acute neurologic process/cord compression  - PT eval- rehab  - Neuro appreciated- no need for further imaging; suspect HIV myelopathy given poss non compliance with meds and elevated viral load   - B12 -349 ,  homocysteine- normal, folate- normal , CK- normal; MMA- in lab  - neuro does not think b-12 contributing but keep above 400; on b12 supplementation.    Chronic back pain.   ·  Plan: - Reportedly takes Percocet PRN, Lyrica, gabapentin, and Baclofen PRN.   - MR T/L-spine 9/2/2022: No cord or cauda equina compression. No abnormal cord signal or enhancement  - x-ray of lumbosacral spine on 6/15/23 reviewed which was essentially normal  - will d/c gabapentin per pt request as he does not feel it is helping with his pain  - pt reports anaphylaxis with Toradol, will avoid NSAIDs  - PT eval- rehab   - pain management recs appreciated - methocarbomol 750 q8, lidocaine patch daily, s/p Tylenol 650 standing x 2 days  - Utox with cocaine and THC   - c/w lyrica 100 BID.    HIV disease.   ·  Plan: C/w Biktarvy  - CD4 count in December 2022 was 145, viral load still detectable in January 2023  - On Bactrim  - repeat CD4 157; VL 775k  - HIV genotype in lab.    Medication management.   ·  Plan: - reviewed. istop Reference #: [id number] last opiod rx 5/30 4 days supply.    Need for prophylactic measure.   ·  Plan: DVT ppx: Lovenox  Dispo : rehab    Pt was medically stable for rehab, awaiting insurance authorization. Called by nurse to evaluate patient who wishes to leave the hospital against medical advice. Patient is A&O x3 and has full capacity to make his own medical decisions. Pt was informed of their medical conditions, benefits, and alternatives to treatment as well as the risks of refusing treatment and the seriousness of leaving against medical advice such as the risks of heart attack, stroke, seizure, and even death were fully explained to the patient.  After expressing full understanding, patient then signs out against medical advice witnessed by the nurse.  Attending made aware.

## 2023-06-24 NOTE — DISCHARGE NOTE PROVIDER - CARE PROVIDERS DIRECT ADDRESSES
[email or web address],DirectAddress_Unknown,DirectAddress_Unknown

## 2023-06-24 NOTE — DISCHARGE NOTE PROVIDER - NSDCCPCAREPLAN_GEN_ALL_CORE_FT
PRINCIPAL DISCHARGE DIAGNOSIS  Diagnosis: Back pain  Assessment and Plan of Treatment: You were started on Lyrica for HIV myelopathy. Refused Rehab and signed out against medical advice. follow up with Neurology      SECONDARY DISCHARGE DIAGNOSES  Diagnosis: HIV disease  Assessment and Plan of Treatment: Continue your prescribed medications, Biktarvy, bactrim. Follow up with Infectious Disease (Dr Hunter) as outpatient

## 2023-06-24 NOTE — DISCHARGE NOTE PROVIDER - CARE PROVIDER_API CALL
Easton Hunter)  Internal Medicine  [address] [place] [address]  Phone: [phone]  Fax: [phone]  Follow Up Time:     Raman Tapia  Neurology  [address], [address]  [place] [address]  Phone: [phone]  Fax: [phone]  Follow Up Time:     Des Garcia)  Neurology; Neurophysiology  [address], [address]  [place] [address]  Phone: [phone]  Fax: [phone]  Follow Up Time:

## 2023-07-04 ENCOUNTER — INPATIENT (INPATIENT)
Facility: HOSPITAL | Age: 33
LOS: 3 days | Discharge: LEFT BEFORE DISCHARGE | DRG: 892 | End: 2023-07-08
Attending: FAMILY MEDICINE | Admitting: INTERNAL MEDICINE
Payer: MEDICAID

## 2023-07-04 ENCOUNTER — TRANSCRIPTION ENCOUNTER (OUTPATIENT)
Age: 33
End: 2023-07-04

## 2023-07-04 VITALS
HEIGHT: 71 IN | OXYGEN SATURATION: 99 % | RESPIRATION RATE: 18 BRPM | HEART RATE: 72 BPM | TEMPERATURE: 98 F | SYSTOLIC BLOOD PRESSURE: 137 MMHG | WEIGHT: 177.03 LBS | DIASTOLIC BLOOD PRESSURE: 78 MMHG

## 2023-07-04 DIAGNOSIS — Z98.890 OTHER SPECIFIED POSTPROCEDURAL STATES: Chronic | ICD-10-CM

## 2023-07-04 LAB
ALBUMIN SERPL ELPH-MCNC: 3.7 G/DL — SIGNIFICANT CHANGE UP (ref 3.3–5)
ALP SERPL-CCNC: 58 U/L — SIGNIFICANT CHANGE UP (ref 40–120)
ALT FLD-CCNC: 27 U/L — SIGNIFICANT CHANGE UP (ref 12–78)
ANION GAP SERPL CALC-SCNC: 4 MMOL/L — LOW (ref 5–17)
AST SERPL-CCNC: 19 U/L — SIGNIFICANT CHANGE UP (ref 15–37)
BASOPHILS # BLD AUTO: 0.01 K/UL — SIGNIFICANT CHANGE UP (ref 0–0.2)
BASOPHILS NFR BLD AUTO: 0.2 % — SIGNIFICANT CHANGE UP (ref 0–2)
BICTEGRAVIR RESISTANCE: SIGNIFICANT CHANGE UP
BILIRUB SERPL-MCNC: 0.6 MG/DL — SIGNIFICANT CHANGE UP (ref 0.2–1.2)
BLD GP AB SCN SERPL QL: SIGNIFICANT CHANGE UP
BUN SERPL-MCNC: 21 MG/DL — SIGNIFICANT CHANGE UP (ref 7–23)
CABOTEGRAVIR RESISTANCE: SIGNIFICANT CHANGE UP
CALCIUM SERPL-MCNC: 9 MG/DL — SIGNIFICANT CHANGE UP (ref 8.5–10.1)
CHLORIDE SERPL-SCNC: 109 MMOL/L — HIGH (ref 96–108)
CO2 SERPL-SCNC: 27 MMOL/L — SIGNIFICANT CHANGE UP (ref 22–31)
CREAT SERPL-MCNC: 0.94 MG/DL — SIGNIFICANT CHANGE UP (ref 0.5–1.3)
DOLUTEGRAVIR ISLT GENOTYP: SIGNIFICANT CHANGE UP
EGFR: 109 ML/MIN/1.73M2 — SIGNIFICANT CHANGE UP
ELVITEGRAVIR ISLT GENOTYP: SIGNIFICANT CHANGE UP
EOSINOPHIL # BLD AUTO: 0.03 K/UL — SIGNIFICANT CHANGE UP (ref 0–0.5)
EOSINOPHIL NFR BLD AUTO: 0.6 % — SIGNIFICANT CHANGE UP (ref 0–6)
GLUCOSE SERPL-MCNC: 93 MG/DL — SIGNIFICANT CHANGE UP (ref 70–99)
HCT VFR BLD CALC: 36.9 % — LOW (ref 39–50)
HGB BLD-MCNC: 12.4 G/DL — LOW (ref 13–17)
HIV 1 RNA IN SERPLBLD-SEQ: SIGNIFICANT CHANGE UP
HIV 1 RNA RT + PR + IN SERPLBLD-SEQ: DETECTED
HIV RT+PR MUT TESTED ISLT: SIGNIFICANT CHANGE UP
HIV-1 GENOTYPE DRUG RESISTANCE - RESULT: DETECTED
HIV-1 INTEGRASE GENOTYPE - RESULT: SIGNIFICANT CHANGE UP
IMM GRANULOCYTES NFR BLD AUTO: 0.2 % — SIGNIFICANT CHANGE UP (ref 0–0.9)
LYMPHOCYTES # BLD AUTO: 3.14 K/UL — SIGNIFICANT CHANGE UP (ref 1–3.3)
LYMPHOCYTES # BLD AUTO: 61.2 % — HIGH (ref 13–44)
MCHC RBC-ENTMCNC: 30 PG — SIGNIFICANT CHANGE UP (ref 27–34)
MCHC RBC-ENTMCNC: 33.6 GM/DL — SIGNIFICANT CHANGE UP (ref 32–36)
MCV RBC AUTO: 89.3 FL — SIGNIFICANT CHANGE UP (ref 80–100)
MONOCYTES # BLD AUTO: 0.59 K/UL — SIGNIFICANT CHANGE UP (ref 0–0.9)
MONOCYTES NFR BLD AUTO: 11.5 % — SIGNIFICANT CHANGE UP (ref 2–14)
NEUTROPHILS # BLD AUTO: 1.35 K/UL — LOW (ref 1.8–7.4)
NEUTROPHILS NFR BLD AUTO: 26.3 % — LOW (ref 43–77)
PLATELET # BLD AUTO: 244 K/UL — SIGNIFICANT CHANGE UP (ref 150–400)
POTASSIUM SERPL-MCNC: 3.3 MMOL/L — LOW (ref 3.5–5.3)
POTASSIUM SERPL-SCNC: 3.3 MMOL/L — LOW (ref 3.5–5.3)
PROT SERPL-MCNC: 8.4 GM/DL — HIGH (ref 6–8.3)
RALTEGRAVIR ISLT GENOTYP: SIGNIFICANT CHANGE UP
RBC # BLD: 4.13 M/UL — LOW (ref 4.2–5.8)
RBC # FLD: 13.4 % — SIGNIFICANT CHANGE UP (ref 10.3–14.5)
SODIUM SERPL-SCNC: 140 MMOL/L — SIGNIFICANT CHANGE UP (ref 135–145)
VIRAL LOAD DATE: SIGNIFICANT CHANGE UP
WBC # BLD: 5.13 K/UL — SIGNIFICANT CHANGE UP (ref 3.8–10.5)
WBC # FLD AUTO: 5.13 K/UL — SIGNIFICANT CHANGE UP (ref 3.8–10.5)

## 2023-07-04 PROCEDURE — 99285 EMERGENCY DEPT VISIT HI MDM: CPT

## 2023-07-04 PROCEDURE — 72131 CT LUMBAR SPINE W/O DYE: CPT | Mod: 26,MA

## 2023-07-04 RX ORDER — DIAZEPAM 5 MG
5 TABLET ORAL ONCE
Refills: 0 | Status: DISCONTINUED | OUTPATIENT
Start: 2023-07-04 | End: 2023-07-04

## 2023-07-04 RX ADMIN — Medication 5 MILLIGRAM(S): at 20:32

## 2023-07-04 NOTE — ED PROVIDER NOTE - CLINICAL SUMMARY MEDICAL DECISION MAKING FREE TEXT BOX
Patient with hx of HIV myelopathy signed AMA from [facility] 6/24 c/o increased back pain and weakness of legs. Patient states he fell twice. States he's compliant with medications. Plan for XRs, labs, will likely need to be admitted.

## 2023-07-04 NOTE — ED PROVIDER NOTE - OBJECTIVE STATEMENT
33 year old male with hx of chronic lower back pain, GBS/HIV myelopathy, substance use presents to the ED c/o severe back pain worsening throughout the day. States he's tried to walk but is unable to and fell twice, pain radiates to LE. Patient states he's compliant with pain meds. Pt was hosp for similar pain 6/17 and d/c 6/24 and was recommended to go to PT. States he went but didnt meet the requirements so he left. Denies urinary or fecal incontinence. Denies fever. 33 year old male with hx of chronic lower back pain, GBS/HIV myelopathy, substance use presents to the ED c/o severe back pain worsening throughout the day. States he's tried to walk but is unable to and fell twice, pain radiates to LE. Patient states he's compliant with pain meds. Pt was hospitalized for similar pain 6/17 and d/c 6/24 and was recommended to go to PT. States he went but didnt meet the requirements so he left. Denies urinary or fecal incontinence. Denies fever.

## 2023-07-04 NOTE — ED PROVIDER NOTE - MUSCULOSKELETAL, MLM
+tender thoracic spine, lumbar spine. able to move both of his feet against hands, +b/l patellar reflexes

## 2023-07-04 NOTE — ED ADULT NURSE NOTE - NSICDXPASTMEDICALHX_GEN_ALL_CORE_FT
PAST MEDICAL HISTORY:  Asthma     Chronic sinusitis     Closed fracture of multiple ribs of right side, initial encounter     Cocaine abuse     GBS (Guillain Eden syndrome)     HIV (human immunodeficiency virus infection) from birth    Homeless

## 2023-07-04 NOTE — ED ADULT NURSE NOTE - ALCOHOL PRE SCREEN (AUDIT - C)
Preventive Health Recommendations  Male Ages 50 - 64    Yearly exam:             See your health care provider every year in order to  o   Review health changes.   o   Discuss preventive care.    o   Review your medicines if your doctor has prescribed any.     Have a cholesterol test every 5 years, or more frequently if you are at risk for high cholesterol/heart disease.     Have a diabetes test (fasting glucose) every three years. If you are at risk for diabetes, you should have this test more often.     Have a colonoscopy at age 50, or have a yearly FIT test (stool test). These exams will check for colon cancer.      Talk with your health care provider about whether or not a prostate cancer screening test (PSA) is right for you.    You should be tested each year for STDs (sexually transmitted diseases), if you re at risk.     Shots: Get a flu shot each year. Get a tetanus shot every 10 years.     Nutrition:    Eat at least 5 servings of fruits and vegetables daily.     Eat whole-grain bread, whole-wheat pasta and brown rice instead of white grains and rice.     Get adequate Calcium and Vitamin D.     Lifestyle    Exercise for at least 150 minutes a week (30 minutes a day, 5 days a week). This will help you control your weight and prevent disease.     Limit alcohol to one drink per day.     No smoking.     Wear sunscreen to prevent skin cancer.     See your dentist every six months for an exam and cleaning.     See your eye doctor every 1 to 2 years.     Statement Selected

## 2023-07-04 NOTE — ED STATDOCS - PROGRESS NOTE DETAILS
Inderjit Wise for attending Dr. Serrano: 34 y/o male presents to the ED c/o severe lower back pain that has worsened throughout the day. Patient states the pain makes it difficult to walk. Patient denies fever. Patient states he fell 3/4 times in the last 24 hours. Patient denies trauma to cause the pain.

## 2023-07-04 NOTE — ED ADULT TRIAGE NOTE - CHIEF COMPLAINT QUOTE
pt presents to the ED with chronic back pain that got worse throughout the day. pt states 'Its unbearable and my legs feel unresponsive.' pt is able to move both legs without issue. no trauma to cause this pain. pt is A&Ox4 with no other complaints at this time.

## 2023-07-04 NOTE — ED PROVIDER NOTE - NSICDXPASTMEDICALHX_GEN_ALL_CORE_FT
PAST MEDICAL HISTORY:  Asthma     Chronic sinusitis     Closed fracture of multiple ribs of right side, initial encounter     Cocaine abuse     GBS (Guillain [place] syndrome)     HIV (human immunodeficiency virus infection) from birth    Homeless

## 2023-07-04 NOTE — ED PROVIDER NOTE - CARE PLAN
1 Principal Discharge DX:	HIV myelitis  Secondary Diagnosis:	Back pain  Secondary Diagnosis:	Falls

## 2023-07-04 NOTE — ED PROVIDER NOTE - CCCP TRG CHIEF CMPLNT
back pain general
Coumadin/Warfarin increases the risk for bleeding. Notify your doctor if you see any bleeding or any of the side effects listed in the Coumadin/Warfarin Booklet. Diet and medications can affect the PT/INR blood level. When Coumadin/Warfarin is taken with other medicines it can change the way other medicines work. Other medicines can also change the way Coumadin/Warfarin works. It is very important to tell your healthcare provider about all of the other medicines, including over-the-counter medications, herbs, diet supplements, or products containing Vitamin K. Call your doctor before starting, stopping, or changing the dose of any prescription or over-the-counter medications.    Any product containing Aspirin lessens the blood's ability to form clots and adds to the effect of Coumadin/Warfarin.    Never take Aspirin without speaking with your health care provider.

## 2023-07-04 NOTE — ED ADULT NURSE NOTE - OBJECTIVE STATEMENT
Pt comes to ED c/o worsening chronic back pain throughout the last several hours. Pt states he's unable to walk as well d/t the pain. Pt appears in no distress at this time and is able to move both legs at this time. Pt denies any change in daily activity or injury to trigger an i crease in pain.  Pt is Aox4 and speaking in full sentences. Pt sent to CT with no issues. Pt PMH HIV.

## 2023-07-04 NOTE — ED ADULT NURSE NOTE - NSFALLRISKINTERV_ED_ALL_ED
Assistance OOB with selected safe patient handling equipment if applicable/Assistance with ambulation/Communicate fall risk and risk factors to all staff, patient, and family/Monitor gait and stability/Provide visual cue: yellow wristband, yellow gown, etc/Reinforce activity limits and safety measures with patient and family/Call bell, personal items and telephone in reach/Instruct patient to call for assistance before getting out of bed/chair/stretcher/Non-slip footwear applied when patient is off stretcher/Vanduser to call system/Physically safe environment - no spills, clutter or unnecessary equipment/Purposeful Proactive Rounding/Room/bathroom lighting operational, light cord in reach

## 2023-07-05 DIAGNOSIS — B20 HUMAN IMMUNODEFICIENCY VIRUS [HIV] DISEASE: ICD-10-CM

## 2023-07-05 LAB
ADD ON TEST-SPECIMEN IN LAB: SIGNIFICANT CHANGE UP
AMPHET UR-MCNC: NEGATIVE — SIGNIFICANT CHANGE UP
ANION GAP SERPL CALC-SCNC: 1 MMOL/L — LOW (ref 5–17)
APPEARANCE UR: CLEAR — SIGNIFICANT CHANGE UP
BARBITURATES UR SCN-MCNC: NEGATIVE — SIGNIFICANT CHANGE UP
BASOPHILS # BLD AUTO: 0 K/UL — SIGNIFICANT CHANGE UP (ref 0–0.2)
BASOPHILS NFR BLD AUTO: 0 % — SIGNIFICANT CHANGE UP (ref 0–2)
BENZODIAZ UR-MCNC: NEGATIVE — SIGNIFICANT CHANGE UP
BILIRUB UR-MCNC: NEGATIVE — SIGNIFICANT CHANGE UP
BUN SERPL-MCNC: 16 MG/DL — SIGNIFICANT CHANGE UP (ref 7–23)
CALCIUM SERPL-MCNC: 8.6 MG/DL — SIGNIFICANT CHANGE UP (ref 8.5–10.1)
CHLORIDE SERPL-SCNC: 111 MMOL/L — HIGH (ref 96–108)
CO2 SERPL-SCNC: 27 MMOL/L — SIGNIFICANT CHANGE UP (ref 22–31)
COCAINE METAB.OTHER UR-MCNC: POSITIVE — SIGNIFICANT CHANGE UP
COLOR SPEC: YELLOW — SIGNIFICANT CHANGE UP
CREAT SERPL-MCNC: 0.79 MG/DL — SIGNIFICANT CHANGE UP (ref 0.5–1.3)
DIFF PNL FLD: NEGATIVE — SIGNIFICANT CHANGE UP
EGFR: 120 ML/MIN/1.73M2 — SIGNIFICANT CHANGE UP
EOSINOPHIL # BLD AUTO: 0.04 K/UL — SIGNIFICANT CHANGE UP (ref 0–0.5)
EOSINOPHIL NFR BLD AUTO: 1.1 % — SIGNIFICANT CHANGE UP (ref 0–6)
GLUCOSE SERPL-MCNC: 112 MG/DL — HIGH (ref 70–99)
GLUCOSE UR QL: NEGATIVE — SIGNIFICANT CHANGE UP
HCT VFR BLD CALC: 34.7 % — LOW (ref 39–50)
HGB BLD-MCNC: 11.5 G/DL — LOW (ref 13–17)
IMM GRANULOCYTES NFR BLD AUTO: 0 % — SIGNIFICANT CHANGE UP (ref 0–0.9)
KETONES UR-MCNC: ABNORMAL
LEUKOCYTE ESTERASE UR-ACNC: NEGATIVE — SIGNIFICANT CHANGE UP
LYMPHOCYTES # BLD AUTO: 2.51 K/UL — SIGNIFICANT CHANGE UP (ref 1–3.3)
LYMPHOCYTES # BLD AUTO: 69.1 % — HIGH (ref 13–44)
MAGNESIUM SERPL-MCNC: 1.6 MG/DL — SIGNIFICANT CHANGE UP (ref 1.6–2.6)
MCHC RBC-ENTMCNC: 29.8 PG — SIGNIFICANT CHANGE UP (ref 27–34)
MCHC RBC-ENTMCNC: 33.1 GM/DL — SIGNIFICANT CHANGE UP (ref 32–36)
MCV RBC AUTO: 89.9 FL — SIGNIFICANT CHANGE UP (ref 80–100)
METHADONE UR-MCNC: NEGATIVE — SIGNIFICANT CHANGE UP
MONOCYTES # BLD AUTO: 0.43 K/UL — SIGNIFICANT CHANGE UP (ref 0–0.9)
MONOCYTES NFR BLD AUTO: 11.8 % — SIGNIFICANT CHANGE UP (ref 2–14)
NEUTROPHILS # BLD AUTO: 0.65 K/UL — LOW (ref 1.8–7.4)
NEUTROPHILS NFR BLD AUTO: 18 % — LOW (ref 43–77)
NITRITE UR-MCNC: NEGATIVE — SIGNIFICANT CHANGE UP
OPIATES UR-MCNC: NEGATIVE — SIGNIFICANT CHANGE UP
PCP SPEC-MCNC: SIGNIFICANT CHANGE UP
PCP UR-MCNC: NEGATIVE — SIGNIFICANT CHANGE UP
PH UR: 6 — SIGNIFICANT CHANGE UP (ref 5–8)
PLATELET # BLD AUTO: 208 K/UL — SIGNIFICANT CHANGE UP (ref 150–400)
POTASSIUM SERPL-MCNC: 3.2 MMOL/L — LOW (ref 3.5–5.3)
POTASSIUM SERPL-SCNC: 3.2 MMOL/L — LOW (ref 3.5–5.3)
PROT UR-MCNC: NEGATIVE — SIGNIFICANT CHANGE UP
RBC # BLD: 3.86 M/UL — LOW (ref 4.2–5.8)
RBC # FLD: 13.4 % — SIGNIFICANT CHANGE UP (ref 10.3–14.5)
SODIUM SERPL-SCNC: 139 MMOL/L — SIGNIFICANT CHANGE UP (ref 135–145)
SP GR SPEC: 1.02 — SIGNIFICANT CHANGE UP (ref 1.01–1.02)
THC UR QL: POSITIVE — SIGNIFICANT CHANGE UP
UROBILINOGEN FLD QL: 4
WBC # BLD: 3.63 K/UL — LOW (ref 3.8–10.5)
WBC # FLD AUTO: 3.63 K/UL — LOW (ref 3.8–10.5)

## 2023-07-05 PROCEDURE — 36415 COLL VENOUS BLD VENIPUNCTURE: CPT

## 2023-07-05 PROCEDURE — 80048 BASIC METABOLIC PNL TOTAL CA: CPT

## 2023-07-05 PROCEDURE — 81003 URINALYSIS AUTO W/O SCOPE: CPT

## 2023-07-05 PROCEDURE — 72157 MRI CHEST SPINE W/O & W/DYE: CPT

## 2023-07-05 PROCEDURE — 80053 COMPREHEN METABOLIC PANEL: CPT

## 2023-07-05 PROCEDURE — 99223 1ST HOSP IP/OBS HIGH 75: CPT

## 2023-07-05 PROCEDURE — 85025 COMPLETE CBC W/AUTO DIFF WBC: CPT

## 2023-07-05 PROCEDURE — 97116 GAIT TRAINING THERAPY: CPT | Mod: GP

## 2023-07-05 PROCEDURE — 12345: CPT | Mod: NC

## 2023-07-05 PROCEDURE — 83735 ASSAY OF MAGNESIUM: CPT

## 2023-07-05 PROCEDURE — 97530 THERAPEUTIC ACTIVITIES: CPT | Mod: GP

## 2023-07-05 PROCEDURE — 72158 MRI LUMBAR SPINE W/O & W/DYE: CPT

## 2023-07-05 PROCEDURE — 80307 DRUG TEST PRSMV CHEM ANLYZR: CPT

## 2023-07-05 PROCEDURE — 97162 PT EVAL MOD COMPLEX 30 MIN: CPT | Mod: GP

## 2023-07-05 PROCEDURE — A9579: CPT

## 2023-07-05 RX ORDER — ALPRAZOLAM 0.25 MG
0.5 TABLET ORAL ONCE
Refills: 0 | Status: DISCONTINUED | OUTPATIENT
Start: 2023-07-05 | End: 2023-07-05

## 2023-07-05 RX ORDER — PREGABALIN 225 MG/1
1000 CAPSULE ORAL DAILY
Refills: 0 | Status: DISCONTINUED | OUTPATIENT
Start: 2023-07-05 | End: 2023-07-08

## 2023-07-05 RX ORDER — LIDOCAINE 4 G/100G
1 CREAM TOPICAL EVERY 24 HOURS
Refills: 0 | Status: DISCONTINUED | OUTPATIENT
Start: 2023-07-05 | End: 2023-07-08

## 2023-07-05 RX ORDER — ACETAMINOPHEN 500 MG
650 TABLET ORAL EVERY 6 HOURS
Refills: 0 | Status: DISCONTINUED | OUTPATIENT
Start: 2023-07-05 | End: 2023-07-08

## 2023-07-05 RX ORDER — QUETIAPINE FUMARATE 200 MG/1
300 TABLET, FILM COATED ORAL AT BEDTIME
Refills: 0 | Status: DISCONTINUED | OUTPATIENT
Start: 2023-07-05 | End: 2023-07-08

## 2023-07-05 RX ORDER — METHOCARBAMOL 500 MG/1
750 TABLET, FILM COATED ORAL EVERY 8 HOURS
Refills: 0 | Status: DISCONTINUED | OUTPATIENT
Start: 2023-07-05 | End: 2023-07-08

## 2023-07-05 RX ORDER — BICTEGRAVIR SODIUM, EMTRICITABINE, AND TENOFOVIR ALAFENAMIDE FUMARATE 30; 120; 15 MG/1; MG/1; MG/1
1 TABLET ORAL DAILY
Refills: 0 | Status: DISCONTINUED | OUTPATIENT
Start: 2023-07-05 | End: 2023-07-08

## 2023-07-05 RX ORDER — MAGNESIUM OXIDE 400 MG ORAL TABLET 241.3 MG
400 TABLET ORAL DAILY
Refills: 0 | Status: COMPLETED | OUTPATIENT
Start: 2023-07-05 | End: 2023-07-08

## 2023-07-05 RX ORDER — POTASSIUM CHLORIDE 20 MEQ
20 PACKET (EA) ORAL
Refills: 0 | Status: COMPLETED | OUTPATIENT
Start: 2023-07-05 | End: 2023-07-07

## 2023-07-05 RX ORDER — LANOLIN ALCOHOL/MO/W.PET/CERES
3 CREAM (GRAM) TOPICAL AT BEDTIME
Refills: 0 | Status: DISCONTINUED | OUTPATIENT
Start: 2023-07-05 | End: 2023-07-08

## 2023-07-05 RX ORDER — QUETIAPINE FUMARATE 200 MG/1
100 TABLET, FILM COATED ORAL DAILY
Refills: 0 | Status: DISCONTINUED | OUTPATIENT
Start: 2023-07-05 | End: 2023-07-08

## 2023-07-05 RX ADMIN — Medication 3 MILLIGRAM(S): at 02:26

## 2023-07-05 RX ADMIN — Medication 650 MILLIGRAM(S): at 11:06

## 2023-07-05 RX ADMIN — QUETIAPINE FUMARATE 100 MILLIGRAM(S): 200 TABLET, FILM COATED ORAL at 10:07

## 2023-07-05 RX ADMIN — LIDOCAINE 1 PATCH: 4 CREAM TOPICAL at 17:02

## 2023-07-05 RX ADMIN — Medication 1 TABLET(S): at 10:07

## 2023-07-05 RX ADMIN — Medication 100 MILLIGRAM(S): at 02:19

## 2023-07-05 RX ADMIN — METHOCARBAMOL 750 MILLIGRAM(S): 500 TABLET, FILM COATED ORAL at 13:58

## 2023-07-05 RX ADMIN — Medication 100 MILLIGRAM(S): at 22:49

## 2023-07-05 RX ADMIN — Medication 20 MILLIEQUIVALENT(S): at 22:48

## 2023-07-05 RX ADMIN — Medication 650 MILLIGRAM(S): at 10:06

## 2023-07-05 RX ADMIN — PREGABALIN 1000 MICROGRAM(S): 225 CAPSULE ORAL at 10:07

## 2023-07-05 RX ADMIN — Medication 650 MILLIGRAM(S): at 20:18

## 2023-07-05 RX ADMIN — BICTEGRAVIR SODIUM, EMTRICITABINE, AND TENOFOVIR ALAFENAMIDE FUMARATE 1 TABLET(S): 30; 120; 15 TABLET ORAL at 10:28

## 2023-07-05 RX ADMIN — QUETIAPINE FUMARATE 300 MILLIGRAM(S): 200 TABLET, FILM COATED ORAL at 22:48

## 2023-07-05 RX ADMIN — Medication 100 MILLIGRAM(S): at 10:07

## 2023-07-05 RX ADMIN — LIDOCAINE 1 PATCH: 4 CREAM TOPICAL at 06:02

## 2023-07-05 RX ADMIN — METHOCARBAMOL 750 MILLIGRAM(S): 500 TABLET, FILM COATED ORAL at 22:49

## 2023-07-05 RX ADMIN — QUETIAPINE FUMARATE 300 MILLIGRAM(S): 200 TABLET, FILM COATED ORAL at 02:21

## 2023-07-05 RX ADMIN — LIDOCAINE 1 PATCH: 4 CREAM TOPICAL at 02:18

## 2023-07-05 RX ADMIN — Medication 0.5 MILLIGRAM(S): at 13:57

## 2023-07-05 NOTE — DIETITIAN INITIAL EVALUATION ADULT - PERTINENT MEDS FT
MEDICATIONS  (STANDING):  bictegravir 50 mG/emtricitabine 200 mG/tenofovir alafenamide 25 mG (BIKTARVY) 1 Tablet(s) Oral daily  cyanocobalamin 1000 MICROGram(s) Oral daily  lidocaine   4% Patch 1 Patch Transdermal every 24 hours  methocarbamol 750 milliGRAM(s) Oral every 8 hours  potassium chloride    Tablet ER 20 milliEquivalent(s) Oral two times a day  pregabalin 100 milliGRAM(s) Oral two times a day  QUEtiapine 100 milliGRAM(s) Oral daily  QUEtiapine 300 milliGRAM(s) Oral at bedtime  trimethoprim  160 mG/sulfamethoxazole 800 mG 1 Tablet(s) Oral <User Schedule>    MEDICATIONS  (PRN):  acetaminophen     Tablet .. 650 milliGRAM(s) Oral every 6 hours PRN Temp greater or equal to 38C (100.4F), Mild Pain (1 - 3)  aluminum hydroxide/magnesium hydroxide/simethicone Suspension 30 milliLiter(s) Oral every 4 hours PRN Dyspepsia  melatonin 3 milliGRAM(s) Oral at bedtime PRN Insomnia   MEDICATIONS  (STANDING):  bictegravir 50 mG/emtricitabine 200 mG/tenofovir alafenamide 25 mG (BIKTARVY) 1 Tablet(s) Oral daily  cyanocobalamin 1000 MICROGram(s) Oral daily  lidocaine   4% Patch 1 Patch Transdermal every 24 hours  methocarbamol 750 milliGRAM(s) Oral every 8 hours  potassium chloride    Tablet ER 20 milliEquivalent(s) Oral two times a day  pregabalin 100 milliGRAM(s) Oral two times a day  QUEtiapine 100 milliGRAM(s) Oral daily  QUEtiapine 300 milliGRAM(s) Oral at bedtime  trimethoprim  160 mG/sulfamethoxazole 800 mG 1 Tablet(s) Oral <User Schedule>    MEDICATIONS  (PRN):  acetaminophen     Tablet .. 650 milliGRAM(s) Oral every 6 hours PRN Temp greater or equal to 38C (100.4F), Mild Pain (1 - 3)  aluminum hydroxide/magnesium hydroxide/simethicone Suspension 30 milliLiter(s) Oral every 4 hours PRN Dyspepsia  melatonin 3 milliGRAM(s) Oral at bedtime PRN Insomnia

## 2023-07-05 NOTE — CONSULT NOTE ADULT - ASSESSMENT
31 yo M w/ PMHx congenital HIV, chronic back pain, substance use, HIV myelopathy, asthma, multiple prior ED visits/ admissions for fluctuating/ worsening LE weakness and urinary retention, presented to [facility] on 6/17/23 for back pain, LE weakness (L worse than R), saddle anesthesia, incontinence. Had imaging in the past here, multiple spine MRIs without cord/cauda equina compression / abnormal enhancement or lesions, LP in 2021, and EMG/NCS. Had Rheum work up for myelopathy and it was negative. He takes baclofen, gabapentin, lyrica. Prior differential included HIV myelopathy and not GBS based on exam and CSF studies. Although ID previously had not suspected HIV myelopathy given it is considered end stage disease of HIV, his adherence to medications may be difficult due to his domiciled status. Lab significant for viral load in 775K.     Impression: B/l LE weakness, incontinence, with signs of upper motor neuron disease could be from myelopathy from HIV. No obvious triggering history for worsening however does have elevated viral load, concerning for medication nonadherence    Recommendations:  [ ] Defer MR for now since stereotypical symptoms of worsening b/l LE weakness  [ ] Recommend PT, OT, SW evaluation  [ ] Would confirm when last taken baclofen, as can have withdrawal. Can continue his prescribed doses for lyrica, gabapentin  [ ] Expressed importance of outpatient follow up with ID and neurology. If he has insurance difficulty, can follow up with neurology resident clinic  at [facility] 4th floor. Otherwise can see Dr Raman Tapia or Dr Des Garcia.  [ ] B12, MMA, homocysteine, folate, CK 31 yo man who presents with acute worsening of LBP, LE weakness, and falls with inability to walk.  He has a history of congenital HIV, chronic back pain, substance use, non-compliance to HIV meds, domiciled status, HIV myelopathy, asthma, multiple prior ED visits/ admissions for fluctuating/ worsening LE weakness and urinary retention, recent admission to [facility] on 6/17/23 for back pain, LE weakness (L worse than R), saddle anesthesia, incontinence. Had imaging in the past here, multiple spine MRIs without cord/cauda equina compression / abnormal enhancement or lesions, LP in 2021, and EMG/NCS.  Rheum work up for myelopathy  was negative.    Lab significant for viral load most recently was 775K, CD4 157 in 6/2023.      #Acute exacerbation of chronic LBP w leg weakness; myelopathy  MRI T/L spine with and without contrast      #HIV+   management per medicine team/ID      #Fall  PT/OT evaluation    D/W Dr. Kendall        31 yo man who presents with acute worsening of LBP, LE weakness, and falls with inability to walk.  He has a history of congenital HIV, chronic back pain, substance use, non-compliance to HIV meds, domiciled status, HIV myelopathy, asthma, multiple prior ED visits/ admissions for fluctuating/ worsening LE weakness and urinary retention, recent admission to [facility] on 6/17/23 for back pain, LE weakness (L worse than R), saddle anesthesia, incontinence. Had imaging in the past here, multiple spine MRIs without cord/cauda equina compression / abnormal enhancement or lesions, LP in 2021, and EMG/NCS.  Rheum work up for myelopathy  was negative.    Lab significant for viral load most recently was 775K, CD4 157 in 6/2023.      #Acute exacerbation of chronic LBP w leg weakness; myelopathy  MRI T/L spine with and without contrast      #HIV+   management per medicine team/ID      #Fall  PT/OT evaluation    D/W Dr. Kendall      Attending statement:  Chronic back pain and lower extremity weakness fluctuating in severity.  He has had multiple imaging studies as well as LP in the past which has not revealed a cause.  Previously HIV myelopathy has been felt to be less likely due to his CD4 counts/viral loads. However, over the last year he has had worsening of his CD4 count and has had a detectable viral load.  Will repeat MRI thoracic and lumbar spine with and without contrast.   ? cocaine induced myelopathy.  Physical therapy.

## 2023-07-05 NOTE — CONSULT NOTE ADULT - SUBJECTIVE AND OBJECTIVE BOX
CC:  Cant walk 2/2 LBP and LE weakness (05 Jul 2023 00:24)      HPI:  33 yo M w/ PMHx congenital HIV, chronic back pain, substance use, HIV myelopathy, asthma, multiple prior ED visits/ admissions for fluctuating/ worsening LE weakness and urinary retention, presented to [facility] on 6/17/23 for back pain, LE weakness (L worse than R), saddle anesthesia, incontinence. Had imaging in the past here, multiple spine MRIs without cord/cauda equina compression / abnormal enhancement or lesions, LP in 2021, and EMG/NCS. Had Rheum work up for myelopathy and it was negative. He takes baclofen, gabapentin, lyrica. Prior differential included HIV myelopathy and not GBS based on exam and CSF studies. Although ID previously had not suspected HIV myelopathy given it is considered end stage disease of HIV, his adherence to medications may be difficult due to his domiciled status. Lab significant for viral load in 775K.      33 year old male w congenital HIV on Biktarvy, GBS s/p influenza vaccination, chronic lower back pain and lower extremity weakness, substance use presenting with back pain and weakness and falls      Has chronic back pain that got worse throughout the day.   'Its unbearable and my legs feel unresponsive.'   States he's tried to walk but is unable to and fell twice,   pain radiates to LE.   Patient states he's compliant with pain meds  Denies urinary or fecal incontinence.     By chart review : progressive myelopathy for at least 5 years and has had multiple stays for physical therapy  He has had extensive work up in the past - no compressive myelopathy, no  demyelinating disease, no other cause found on labs tests, imaging    Recent admission to [facility] 06-17 to  for c/o increased back pain; AMA prior to discharge to rehab.  States he went but did not meet the requirements so he left.  Had oral thrush then as he had not consistently taken bactrim as outpt. Placed on fluconazole  Seen by neuro and ID. UDS +cocaine and +THC    CT LS spine no fx        PAST MEDICAL HX  Asthma   Chronic sinusitis   Closed fracture of multiple ribs of right side, initial encounter   Cocaine abuse   GBS (Guillain [place] syndrome) 2018  HIV (human immunodeficiency virus infection) from birth  Homeless.     PAST SURGICAL HX  History of orthopedic surgery left arm.     FAMILY HX  Mother  Still living? Unknown  FH: HIV infection, Age at diagnosis: Age Unknown.      SOCIAL HX  denied need for nicotine patch currently  former smoker  living at [facility] shelter (05 Jul 2023 00:24)      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  Asthma  Closed fracture of multiple ribs of right side, initial encounter  Cocaine abuse  Chronic sinusitis  Homeless  GBS (Guillain [place] syndrome) 2018 per patient    History of orthopedic surgery  left arm          FAMILY HISTORY:  FH: HIV infection (Mother)      Social Hx:  Nonsmoker, no drug or alcohol use    MEDICATIONS  (STANDING):  bictegravir 50 mG/emtricitabine 200 mG/tenofovir alafenamide 25 mG (BIKTARVY) 1 Tablet(s) Oral daily  cyanocobalamin 1000 MICROGram(s) Oral daily  lidocaine   4% Patch 1 Patch Transdermal every 24 hours  methocarbamol 750 milliGRAM(s) Oral every 8 hours  pregabalin 100 milliGRAM(s) Oral two times a day  QUEtiapine 300 milliGRAM(s) Oral at bedtime  QUEtiapine 100 milliGRAM(s) Oral daily  trimethoprim  160 mG/sulfamethoxazole 800 mG 1 Tablet(s) Oral <User Schedule>       Allergies  Toradol (Anaphylaxis; Swelling)  Ceclor (Unknown)      ROS: Pertinent positives in HPI, all other ROS were reviewed and are negative.      Vital Signs Last 24 Hrs  T(C): 36.4 (05 Jul 2023 08:46), Max: 36.6 (04 Jul 2023 19:05)  T(F): 97.6 (05 Jul 2023 08:46), Max: 97.9 (04 Jul 2023 19:05)  HR: 66 (05 Jul 2023 08:46) (66 - 76)  BP: 110/61 (05 Jul 2023 08:46) (110/61 - 137/78)  BP(mean): --  RR: 17 (05 Jul 2023 08:46) (17 - 19)  SpO2: 100% (05 Jul 2023 08:46) (97% - 100%)        Constitutional: asleep, easily aroused  and alert.  HEENT: PERRLA, EOMI,   Neck: Supple.  Respiratory: Breath sounds are clear bilaterally  Cardiovascular: S1 and S2, regular   Extremities:  no edema  Musculoskeletal: no joint swelling/tenderness, no abnormal movements  Skin: No rashes    Neurological exam:  HF: A x O x 3. Appropriately interactive, normal affect. Speech fluent, No Aphasia or paraphasic errors.   CN: JOCELYN, EOMI, VFF, facial sensation normal, no NLFD, tongue midline, SCM equal bilaterally  Motor: No pronator drift, Strength 5/5 in upper ext, RLE 4/5, LLE 2/5, normal bulk and tone, no tremors  Sens: Decreased sensation LLE b/l idistally.   Reflexes: Symmetric and normal . BJ 2+, BR 2+, KJ 2+, AJ 2+, downgoing toes b/l, neg Mclain sign  Coord:  No FNFA, dysmetria, AMAN intact   Gait/Balance: annot test              Labs:   07-05    139  |  111<H>  |  16  ----------------------------<  112<H>  3.2<L>   |  27  |  0.79    Ca    8.6      05 Jul 2023 07:10    TPro  8.4<H>  /  Alb  3.7  /  TBili  0.6  /  DBili  x   /  AST  19  /  ALT  27  /  AlkPhos  58  07-04                              11.5   3.63  )-----------( 208      ( 05 Jul 2023 07:10 )             34.7       Radiology:  - CT Head:  - MRI brain  -MRA brain/Carotids  - EEG    A/P:    No IV tpa given because…    -ASA/PLAVIX  -Atorvastatin  -DVT prophylaxis  -Dysphagia screen  -Speech and swallow eval  -PT eval/ rehab eval    Mangement d/w Pt / family /     Total Critical Care Time spent:   CC:  Cant walk 2/2 LBP and LE weakness (05 Jul 2023 00:24)      HPI:  33 yo M w/ PMHx congenital HIV, chronic back pain, substance use, HIV myelopathy, asthma, multiple prior ED visits/ admissions for fluctuating/ worsening LE weakness and urinary retention, last presented to [facility] on 6/17/23 for back pain, LE weakness (L worse than R), saddle anesthesia, incontinence. Had imaging in the past at [facility], multiple spine MRIs without cord/cauda equina compression / abnormal enhancement or lesions, LP in 2021, and EMG/NCS. Had Rheum work up for myelopathy and it was negative. He takes baclofen, gabapentin, lyrica. Prior differential included HIV myelopathy and not GBS based on exam and CSF studies. Although ID previously had not suspected HIV myelopathy given it is considered end stage disease of HIV, his adherence to medications may be difficult due to his domiciled status.  Most recent viral load in 775K.  Recent admission to [facility] 06-17 to  for c/o increased back pain; AMA prior to discharge to rehab.  States he went but did not meet the requirements so he left.   He was walking on own and now says he cannot walk on his own at all due to worsening LE weakness and mid-low back pain. Has chronic back pain that got worse throughout the day.  He says it's unbearable and his legs feel unresponsive.States he's tried to walk but is unable to and fell twice prior to admission.  CT LS showed no acute fracture.  Denies urinary or fecal incontinence, headache, N/V, dizziness, slurred speech, numbness.      PAST MEDICAL HX  Asthma   Chronic sinusitis   Closed fracture of multiple ribs of right side, initial encounter   Cocaine abuse   GBS (Guillain [place] syndrome) 2018  HIV (human immunodeficiency virus infection) from birth  Homeless.     PAST SURGICAL HX  History of orthopedic surgery left arm.     FAMILY HX  Mother  Still living? Unknown  FH: HIV infection, Age at diagnosis: Age Unknown.      SOCIAL HX  denied need for nicotine patch currently  former smoker  living at [facility] shelter (05 Jul 2023 00:24)      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  Asthma  Closed fracture of multiple ribs of right side, initial encounter  Cocaine abuse  Chronic sinusitis  Homeless  GBS (Guillain [place] syndrome) 2018 per patient    History of orthopedic surgery  left arm          FAMILY HISTORY:  FH: HIV infection (Mother)      Social Hx:  Nonsmoker, no drug or alcohol use    MEDICATIONS  (STANDING):  bictegravir 50 mG/emtricitabine 200 mG/tenofovir alafenamide 25 mG (BIKTARVY) 1 Tablet(s) Oral daily  cyanocobalamin 1000 MICROGram(s) Oral daily  lidocaine   4% Patch 1 Patch Transdermal every 24 hours  methocarbamol 750 milliGRAM(s) Oral every 8 hours  pregabalin 100 milliGRAM(s) Oral two times a day  QUEtiapine 300 milliGRAM(s) Oral at bedtime  QUEtiapine 100 milliGRAM(s) Oral daily  trimethoprim  160 mG/sulfamethoxazole 800 mG 1 Tablet(s) Oral <User Schedule>       Allergies  Toradol (Anaphylaxis; Swelling)  Ceclor (Unknown)      ROS: Pertinent positives in HPI, all other ROS were reviewed and are negative.  UA +for THC and cocaine    Vital Signs Last 24 Hrs  T(C): 36.4 (05 Jul 2023 08:46), Max: 36.6 (04 Jul 2023 19:05)  T(F): 97.6 (05 Jul 2023 08:46), Max: 97.9 (04 Jul 2023 19:05)  HR: 66 (05 Jul 2023 08:46) (66 - 76)  BP: 110/61 (05 Jul 2023 08:46) (110/61 - 137/78)  BP(mean): --  RR: 17 (05 Jul 2023 08:46) (17 - 19)  SpO2: 100% (05 Jul 2023 08:46) (97% - 100%)        Constitutional: asleep, easily aroused  and alert.  HEENT: PERRLA, EOMI,   Neck: Supple.  Extremities:  no edema  Musculoskeletal: no joint swelling/tenderness, no abnormal movements  Skin: No rashes    Neurological exam:  HF: A x O x 3. Appropriately interactive, normal affect. Speech fluent, No Aphasia or paraphasic errors.   CN: JOCELYN, EOMI, VFF, facial sensation normal, no NLFD, tongue midline, SCM equal bilaterally  Motor: No pronator drift, Strength 5/5 in upper ext, RLE 4/5, LLE 2/5, normal bulk and tone, no tremors  Sens: Decreased sensation LLE b/l idistally.   Reflexes: Symmetric and normal . Right BJ 3+, BR 3+, KJ 3+, AJ 3+,Left BJ 2+, BR 2+, KJ 2+, AJ 2+  , downgoing toes b/l, neg Mclain sign  Coord:  No FNFA, dysmetria, AMAN intact   Gait/Balance: Cannot test        Labs:   07-05    139  |  111<H>  |  16  ----------------------------<  112<H>  3.2<L>   |  27  |  0.79    Ca    8.6      05 Jul 2023 07:10    TPro  8.4<H>  /  Alb  3.7  /  TBili  0.6  /  DBili  x   /  AST  19  /  ALT  27  /  AlkPhos  58  07-04                              11.5   3.63  )-----------( 208      ( 05 Jul 2023 07:10 )             34.7       Radiology:  < from: CT Lumbar Spine No Cont (07.04.23 @ 20:19) >  The lumbar lordosis is maintained. No prevertebral soft tissue swelling.   Vertebral body heights and alignment are maintained without compression   or subluxation.    Sacroiliac joints are patent bilaterally.    IMPRESSION:    No fracture.     CC:  Cant walk 2/2 LBP and LE weakness (05 Jul 2023 00:24)      HPI:  31 yo M w/ PMHx congenital HIV, chronic back pain, substance use, HIV myelopathy, asthma, multiple prior ED visits/ admissions for fluctuating/ worsening LE weakness and urinary retention, last presented to [facility] on 6/17/23 for back pain, LE weakness (L worse than R), saddle anesthesia, incontinence. Had imaging in the past at [facility], multiple spine MRIs without cord/cauda equina compression / abnormal enhancement or lesions, LP in 2021, and EMG/NCS. Had Rheum work up for myelopathy and it was negative. He takes baclofen, gabapentin, lyrica. Prior differential included HIV myelopathy and not GBS based on exam and CSF studies. Although ID previously had not suspected HIV myelopathy given it is considered end stage disease of HIV, his adherence to medications may be difficult due to his domiciled status.  Most recent viral load in 775K.  Recent admission to [facility] 06-17 to  for c/o increased back pain; AMA prior to discharge to rehab.  States he went but did not meet the requirements so he left.   He was walking on own and now says he cannot walk on his own at all due to worsening LE weakness and mid-low back pain. Has chronic back pain that got worse throughout the day.  He says it's unbearable and his legs feel unresponsive. He states he's tried to walk but is unable to and fell twice prior to admission.  CT LS showed no acute fracture.  Denies urinary or fecal incontinence, headache, N/V, dizziness, slurred speech, numbness.      PAST MEDICAL HX  Asthma   Chronic sinusitis   Closed fracture of multiple ribs of right side, initial encounter   Cocaine abuse   GBS (Guillain [place] syndrome) 2018  HIV (human immunodeficiency virus infection) from birth  Homeless.     PAST SURGICAL HX  History of orthopedic surgery left arm.     FAMILY HX  Mother  Still living? Unknown  FH: HIV infection, Age at diagnosis: Age Unknown.      SOCIAL HX  denied need for nicotine patch currently  former smoker  living at [facility] shelter (05 Jul 2023 00:24)      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  Asthma  Closed fracture of multiple ribs of right side, initial encounter  Cocaine abuse  Chronic sinusitis  Homeless  GBS (Guillain [place] syndrome) 2018 per patient    History of orthopedic surgery  left arm          FAMILY HISTORY:  FH: HIV infection (Mother)      Social Hx:  Nonsmoker, no drug or alcohol use    MEDICATIONS  (STANDING):  bictegravir 50 mG/emtricitabine 200 mG/tenofovir alafenamide 25 mG (BIKTARVY) 1 Tablet(s) Oral daily  cyanocobalamin 1000 MICROGram(s) Oral daily  lidocaine   4% Patch 1 Patch Transdermal every 24 hours  methocarbamol 750 milliGRAM(s) Oral every 8 hours  pregabalin 100 milliGRAM(s) Oral two times a day  QUEtiapine 300 milliGRAM(s) Oral at bedtime  QUEtiapine 100 milliGRAM(s) Oral daily  trimethoprim  160 mG/sulfamethoxazole 800 mG 1 Tablet(s) Oral <User Schedule>       Allergies  Toradol (Anaphylaxis; Swelling)  Ceclor (Unknown)      ROS: Pertinent positives in HPI, all other ROS were reviewed and are negative.  UA +for THC and cocaine    Vital Signs Last 24 Hrs  T(C): 36.4 (05 Jul 2023 08:46), Max: 36.6 (04 Jul 2023 19:05)  T(F): 97.6 (05 Jul 2023 08:46), Max: 97.9 (04 Jul 2023 19:05)  HR: 66 (05 Jul 2023 08:46) (66 - 76)  BP: 110/61 (05 Jul 2023 08:46) (110/61 - 137/78)  BP(mean): --  RR: 17 (05 Jul 2023 08:46) (17 - 19)  SpO2: 100% (05 Jul 2023 08:46) (97% - 100%)        Constitutional: asleep, easily aroused  and alert.  HEENT: PERRLA, EOMI,   Neck: Supple.  Extremities:  no edema  Musculoskeletal: no joint swelling/tenderness, no abnormal movements  Skin: No rashes    Neurological exam:  HF: A x O x 3. Appropriately interactive, normal affect. Speech fluent, No Aphasia or paraphasic errors.   CN: JOCELYN, EOMI, VFF, facial sensation normal, no NLFD, tongue midline, SCM equal bilaterally  Motor: No pronator drift, Strength 5/5 in upper ext, RLE: Hip flexion 4-/5, knee flexion 4/5, dorsiflexion 4/5, plantar flextion 5-/5  LLE: Hip flexion 3/5, knee flexion 3/5, dorsiflexion 3+/5, plantar flexion 4+/5   Sens: Decreased sensation LLE b/l distally.   Reflexes: Symmetric and normal . Right BJ 2+, BR 2+, KJ 3+, AJ 3+,Left BJ 2+, BR 2+, KJ 3+, AJ 1+  , downgoing toes b/l, neg Mclain sign, no clonus  Coord:  No FNFA, dysmetria, AMAN intact   Gait/Balance: not tested    spine: Tenderness over mid thoracic spine.      Labs:   07-05    139  |  111<H>  |  16  ----------------------------<  112<H>  3.2<L>   |  27  |  0.79    Ca    8.6      05 Jul 2023 07:10    TPro  8.4<H>  /  Alb  3.7  /  TBili  0.6  /  DBili  x   /  AST  19  /  ALT  27  /  AlkPhos  58  07-04                              11.5   3.63  )-----------( 208      ( 05 Jul 2023 07:10 )             34.7       Radiology:  CT Lumbar Spine No Cont 7/4/23:  The lumbar lordosis is maintained. No prevertebral soft tissue swelling.   Vertebral body heights and alignment are maintained without compression   or subluxation.  Sacroiliac joints are patent bilaterally.    IMPRESSION:  No fracture.

## 2023-07-05 NOTE — PATIENT PROFILE ADULT - FALL HARM RISK - HARM RISK INTERVENTIONS

## 2023-07-05 NOTE — DIETITIAN INITIAL EVALUATION ADULT - ADD RECOMMEND
1) C/w regular diet and will add Ensure + HP shake QD to optimize nutritional needs (provides 350 kcal, 20g protein/ shake)   2) Encourage protein-rich foods, maximize food preferences, provide double portions if pt requests  3) Monitor bowel movements, if no BM for >3 days, consider implementing bowel regimen.   4) MVI w/ minerals daily to ensure 100% RDA met  5) Monitor daily lytes/min and replete prn; hypokalemia noted, no Mg or Phos obtained. Pt at high risk of refeeding syndrome - will monitor closely.  6) Obtain weekly wt to track/trend changes  7) SW consult for d/c planning and to evaluate if pt meets food insecurity criteria; would benefit from food as health program  RD will continue to monitor PO intake, labs, hydration, and wt prn.

## 2023-07-05 NOTE — PHYSICAL THERAPY INITIAL EVALUATION ADULT - TRANSFER SAFETY CONCERNS NOTED: SIT/STAND, REHAB EVAL
decreased balance during turns/losing balance/inability to maintain weight-bearing restrictions w/o assist

## 2023-07-05 NOTE — DIETITIAN INITIAL EVALUATION ADULT - NSICDXPASTMEDICALHX_GEN_ALL_CORE_FT
PAST MEDICAL HISTORY:  Asthma     Chronic sinusitis     Closed fracture of multiple ribs of right side, initial encounter     Cocaine abuse     GBS (Guillain [place] syndrome)     HIV (human immunodeficiency virus infection) from birth    Homeless

## 2023-07-05 NOTE — H&P ADULT - NSHPPHYSICALEXAM_GEN_ALL_CORE
Vital Signs Last 24 Hrs  T(C): 36.6 (04 Jul 2023 19:05), Max: 36.6 (04 Jul 2023 19:05)  T(F): 97.9 (04 Jul 2023 19:05), Max: 97.9 (04 Jul 2023 19:05)  HR: 72 (04 Jul 2023 19:05) (72 - 72)  BP: 137/78 (04 Jul 2023 19:05) (137/78 - 137/78)  BP(mean): --  RR: 18 (04 Jul 2023 19:05) (18 - 18)  SpO2: 99% (04 Jul 2023 19:05) (99% - 99%)    Parameters below as of 04 Jul 2023 19:05  Patient On (Oxygen Delivery Method): room air

## 2023-07-05 NOTE — DIETITIAN INITIAL EVALUATION ADULT - CALCULATED TO (CAL/KG)
Pt presents for wound evaluation and suggested work up from Dr Jose Alfredo Elizabeth regarding incision from L1 to L4 lumbar laminectomy surgery 1/4.  Denies fevers, n/v
[id number]

## 2023-07-05 NOTE — H&P ADULT - HISTORY OF PRESENT ILLNESS
33 year old male w congenital HIV on Biktarvy, GBS s/p influenza vaccination, chronic lower back pain and lower extremity weakness, substance use presenting with back pain and weakness. and falls      Has chronic back pain that got worse throughout the day. pt states 'Its unbearable and my legs feel unresponsive.'   States he's tried to walk but is unable to and fell twice,   pain radiates to LE.   Patient states he's compliant with pain meds. Pt was hospitalized for similar pain 6/17 and d/c 6/24 and was recommended to go to PT. States he went but didnt meet the requirements so he left.   Denies urinary or fecal incontinence.     By chart review : progressive myelopathy for at least 5 year and has had multiple stays for physical therapy  He has had extensive work up in the past - no compressive myelopathy, no  demyelinating disease, no other cause found on labs tests, imaging    Recent admission to [facility] 06-17 to  for c/o increased back pain ; AMA prior to discharge to rehab.  States he went but didnt meet the requirements so he left.  Had oral thrush then as he had not consistently taken bactrim as outpt.  Seen by neuro and ID. UDS +cocaine and +THC    In ED /78  HR 72 RR 18   T 97.9    99% RA  CT LS spine no fx          PAST MEDICAL HX  Asthma   Chronic sinusitis   Closed fracture of multiple ribs of right side, initial encounter   Cocaine abuse   GBS (Guillain [place] syndrome)   HIV (human immunodeficiency virus infection) from birth  Homeless.     PAST SURGICAL HX  History of orthopedic surgery left arm.     FAMILY HX  Mother  Still living? Unknown  FH: HIV infection, Age at diagnosis: Age Unknown.      SOCIAL HX   33 year old male w congenital HIV on Biktarvy, GBS s/p influenza vaccination, chronic lower back pain and lower extremity weakness, substance use presenting with back pain and weakness and falls      Has chronic back pain that got worse throughout the day.   'Its unbearable and my legs feel unresponsive.'   States he's tried to walk but is unable to and fell twice,   pain radiates to LE.   Patient states he's compliant with pain meds  Denies urinary or fecal incontinence.     By chart review : progressive myelopathy for at least 5 years and has had multiple stays for physical therapy  He has had extensive work up in the past - no compressive myelopathy, no  demyelinating disease, no other cause found on labs tests, imaging    Recent admission to [facility] 06-17 to  for c/o increased back pain; AMA prior to discharge to rehab.  States he went but did not meet the requirements so he left.  Had oral thrush then as he had not consistently taken bactrim as outpt. Placed on fluconazole  Seen by neuro and ID. UDS +cocaine and +THC    In ED /78  HR 72 RR 18   T 97.9    99% RA  CT LS spine no fx  oxycodone  valium           PAST MEDICAL HX  Asthma   Chronic sinusitis   Closed fracture of multiple ribs of right side, initial encounter   Cocaine abuse   GBS (Guillain [place] syndrome)   HIV (human immunodeficiency virus infection) from birth  Homeless.     PAST SURGICAL HX  History of orthopedic surgery left arm.     FAMILY HX  Mother  Still living? Unknown  FH: HIV infection, Age at diagnosis: Age Unknown.      SOCIAL HX  denied need for nicotine patch currently  former smoker  living at [facility]

## 2023-07-05 NOTE — H&P ADULT - NSHPLABSRESULTS_GEN_ALL_CORE
CT L spine  FINDINGS:    The lumbar lordosis is maintained. No prevertebral soft tissue swelling.   Vertebral body heights and alignment are maintained without compression   or subluxation.    Sacroiliac joints are patent bilaterally.    IMPRESSION:    No fracture.

## 2023-07-05 NOTE — PHYSICAL THERAPY INITIAL EVALUATION ADULT - STRENGTHENING, PT EVAL
Nsaids Counseling: NSAID Counseling: I discussed with the patient that NSAIDs should be taken with food. Prolonged use of NSAIDs can result in the development of stomach ulcers.  Patient advised to stop taking NSAIDs if abdominal pain occurs.  The patient verbalized understanding of the proper use and possible adverse effects of NSAIDs.  All of the patient's questions and concerns were addressed. Patient will improve B LE strength by at least 1/2 MMT grade  to assist with performing functional mobility and ADLs by discharge.

## 2023-07-05 NOTE — H&P ADULT - SOCIAL HISTORY: SUBSTANCE USE
Additional Notes: -Advised to speak with Dr. Mendoza in March
Render Risk Assessment In Note?: no
Detail Level: Simple
UDS + cocaine and +THC

## 2023-07-05 NOTE — H&P ADULT - TIME BILLING
I spent a total of 80 minutes on the date of this encounter coordinating the patient's care. This includes reviewing prior documentation, results and imaging in addition to completing a history and physical examination on the patient. Further tests, medications, and procedures have been ordered as indicated. Laboratory results and the plan of care were communicated to the patient . Supporting documentation was completed and added to the patient's chart.

## 2023-07-05 NOTE — H&P ADULT - ASSESSMENT
33 year old male w congenital HIV on Biktarvy, GBS s/p influenza vaccination, chronic lower back pain and lower extremity weakness, substance use presenting with back pain and weakness and falls      #Acute exacerbation of chronic LBP w leg weakness; myelopathy  #HIV+   #Fall day of ED presentation  CT no acute changes  +hx congenital HIV and intermittent compliance w meds by chart review AIDS myelopathy is possible  CD4 157  VL 775K on 06-18+  1. admit to med  2. continue Biktarvy  3. bactrim DS 1 q M-W-F for PCP prophylaxis  4. continued B12 1000 mg po daily  5. PT evaluation  6. Physiatry consult  7. neurology consult      #Chronic pain  #Spastic paraparesis  1. antispasmodic agents : flexeril or robaxin effective; baclofen no  methacarbamol 750 mg q 8 hrs  2. lyrica 100 mg BID  3. quetiapine 100 mg q AM  4.     "           300 mg q HS      #Substance use  1. SBIRT  UDS + cocaine and +THC      #VTE  low risk

## 2023-07-05 NOTE — PHYSICAL THERAPY INITIAL EVALUATION ADULT - GENERAL OBSERVATIONS, REHAB EVAL
Pt rec'd on 2S in bed supine, arousing to voice, cooperative and willing to participate with PT. Cleared by RN to be seen. Pt. tolerating functional mobility and gait trng well. Requiring MOD A x 1 for sit to stand and MIN/MOD A x 1 for side stepping along EOB with RW supp. Noted weakness on B LE. Pt. benefits from tactile and verbal cues for posture in standing. Asst'd back into supine, adjusted for comfort. Left Pt. in NAD. Call bell, phone, and tray within reach. RN made aware.

## 2023-07-05 NOTE — DIETITIAN INITIAL EVALUATION ADULT - PERTINENT LABORATORY DATA
07-05    139  |  111<H>  |  16  ----------------------------<  112<H>  3.2<L>   |  27  |  0.79    Ca    8.6      05 Jul 2023 07:10    TPro  8.4<H>  /  Alb  3.7  /  TBili  0.6  /  DBili  x   /  AST  19  /  ALT  27  /  AlkPhos  58  07-04  A1C with Estimated Average Glucose Result: 4.3 % (12-08-22 @ 06:26)  A1C with Estimated Average Glucose Result: 4.6 % (08-09-22 @ 16:00)

## 2023-07-05 NOTE — DIETITIAN INITIAL EVALUATION ADULT - OTHER INFO
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 Recent admission to [facility] 06-17 to  for c/o increased back pain; AMA prior to discharge to rehab.  States he went but did not meet the requirements so he left.  Had oral thrush then as he had not consistently taken bactrim as outpt. FULL CODE.     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 Recent admission to [facility] 06-17 to  for c/o increased back pain; AMA prior to discharge to rehab.  States he went but did not meet the requirements so he left.  Had oral thrush then as he had not consistently taken bactrim as outpt. FULL CODE.    Unable to obtain info upon assessment, pt sleeping and unable to arouse. Observed breakfast tray, consumed 100% meal - continues w/ good appetite since admission. At [facility] requested double portions, please honor food preferences if pt requests double portions w/ each meal. EMR wt doc'd at ~168#, unable to obtain bed scale wt 2/2 scale not working. Previous wt obtained at 180#, 12# wt loss/ 6.66% x 6 months - not clinically significant. Hypokalemia noted, no Mg or PO4 level obtain - pt at risk of refeeding syndrome; replete lytes prn. Appeared thin however no muscle/fat wasting noted on NFPE. Will provide Ensure + HP shake QD to optimize nutritional needs (provides 350 kcal, 20g protein/ shake). Pt living at shelter, would recommend SW consult to determine if pt is food insecure and would qualify for food as health program. See recommendations below.

## 2023-07-05 NOTE — PHYSICAL THERAPY INITIAL EVALUATION ADULT - PERTINENT HX OF CURRENT PROBLEM, REHAB EVAL
Chief Complaint   Patient presents with    Transition of Care Management     TCM Call (since 2/5/2021)     Date and time call was made  3/5/2021 10:07 AM    Patient was hospitialized at  [address]        Date of Admission  03/02/21    Date of discharge  03/03/21    Diagnosis  bariatric surgery    Disposition  Home    Current Symptoms  None      TCM Call (since 2/5/2021)     Scheduled for follow up? Yes    I have advised the patient to call PCP with any new or worsening symptoms  Negrita ZAMUDIO  Assessment/Plan:  Keep up the good work  Diagnoses and all orders for this visit:    Encounter for support and coordination of transition of care    Status post laparoscopic sleeve gastrectomy    BMI 38 0-38 9,adult          Subjective:      Patient ID: Adriana Bennett is a 38 y o  female  TCM, sleeve gastrectomy  Feeling good, weight down 18 lbs since last visit  Continues with liquid diet, advancing tomorrow to soft for one week  Follows up with Surgery in 2 days  Feels like has good energy  Denies fevers, chills, N, V  Has watery stools - orange type  The following portions of the patient's history were reviewed and updated as appropriate: allergies, current medications, past medical history, past social history, past surgical history and problem list     Review of Systems   Constitutional: Negative  HENT: Negative  Eyes: Negative  Respiratory: Negative  Cardiovascular: Negative  Gastrointestinal: Negative  Genitourinary: Negative  Musculoskeletal: Negative  Skin: Negative  Neurological: Negative  Psychiatric/Behavioral: Negative            Objective:      /76 (BP Location: Left arm, Patient Position: Sitting, Cuff Size: Large)   Pulse 101   Temp (!) 97 2 °F (36 2 °C) (Tympanic)   Ht 5' 7" (1 702 m)   Wt 111 kg (244 lb 12 8 oz)   LMP 01/18/2021   SpO2 98%   BMI 38 34 kg/m²       Current Outpatient Medications:     Cetirizine HCl (ZYRTEC PO), Take 10 mg by mouth daily , Disp: , Rfl:     enoxaparin (LOVENOX) 40 mg/0 4 mL, Inject 0 4 mL (40 mg total) under the skin daily for 13 days, Disp: 13 Syringe, Rfl: 0    etonogestrel-ethinyl estradiol (NuvaRing) 0 12-0 015 MG/24HR vaginal ring, Insert 1 each into the vagina every 28 days, Disp: 4 each, Rfl: 3    fluocinolone (SYNALAR) 0 025 % ointment, daily at bedtime Around the mouth, Disp: , Rfl: 2    omeprazole (PriLOSEC) 20 mg delayed release capsule, Take 1 capsule (20 mg total) by mouth daily, Disp: 30 capsule, Rfl: 3    acetaminophen (TYLENOL) 160 mg/5 mL suspension, Take 30 4 mL (975 mg total) by mouth every 8 (eight) hours for 7 days (Patient not taking: Reported on 3/8/2021), Disp: 638 4 mL, Rfl: 0    multivitamin (THERAGRAN) TABS, Take 1 tablet by mouth daily, Disp: , Rfl:     oxyCODONE (ROXICODONE) 5 mg immediate release tablet, Take 1 tablet (5 mg total) by mouth every 4 (four) hours as needed for moderate painMax Daily Amount: 30 mg (Patient not taking: Reported on 3/8/2021), Disp: 10 tablet, Rfl: 0    No Known Allergies    Past Surgical History:   Procedure Laterality Date    ANKLE SURGERY Left     ANKLE STABALIZATION    CHOLECYSTECTOMY  2015    COLONOSCOPY      WA LAP, DOMINGO RESTRICT PROC, LONGITUDINAL GASTRECTOMY N/A 3/2/2021    Procedure: ROBOTIC SLEEVE GASTRECTOMY; ROBOTIC PARAESOPHAGEAL HERNIA REPAIR;  Surgeon: Iman Lora MD;  Location: [facility];  Service: Bariatrics    TOOTH EXTRACTION      TOTAL HIP ARTHROPLASTY Left           Physical Exam  Constitutional:       Appearance: She is well-developed  HENT:      Head: Normocephalic and atraumatic  Right Ear: Tympanic membrane, ear canal and external ear normal       Left Ear: Tympanic membrane, ear canal and external ear normal       Nose: Nose normal       Mouth/Throat:      Mouth: Mucous membranes are moist       Pharynx: Oropharynx is clear  Eyes:      Extraocular Movements: Extraocular movements intact  33 year old male w 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 Conjunctiva/sclera: Conjunctivae normal       Pupils: Pupils are equal, round, and reactive to light  Neck:      Musculoskeletal: Normal range of motion and neck supple  Cardiovascular:      Rate and Rhythm: Normal rate and regular rhythm  Heart sounds: Normal heart sounds  Pulmonary:      Effort: Pulmonary effort is normal       Breath sounds: Normal breath sounds  Abdominal:      General: Abdomen is flat  Bowel sounds are normal       Palpations: Abdomen is soft  Skin:     General: Skin is warm and dry  Comments: Incisions healing well, no infection  Neurological:      General: No focal deficit present  Mental Status: She is alert and oriented to person, place, and time  Deep Tendon Reflexes: Reflexes are normal and symmetric  Psychiatric:         Mood and Affect: Mood normal          Behavior: Behavior normal          Thought Content:  Thought content normal          Judgment: Judgment normal  33 year old male w 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    CT of lumbar spine (-) for acute fracture.

## 2023-07-05 NOTE — DIETITIAN INITIAL EVALUATION ADULT - ORAL INTAKE PTA/DIET HISTORY
Unable to obtain diet/wt hx 2/2 unarousable at time of RD visit, attempted to wake up pt 3 times. Pt is homeless, lives at [facility] shelter. Noted w/ chronic pain however does not affect appetite. Per nutr services note on 12/14/22 - requesting double portions w/ meals.

## 2023-07-06 LAB
ADD ON TEST-SPECIMEN IN LAB: SIGNIFICANT CHANGE UP
ALBUMIN SERPL ELPH-MCNC: 3 G/DL — LOW (ref 3.3–5)
ALP SERPL-CCNC: 55 U/L — SIGNIFICANT CHANGE UP (ref 40–120)
ALT FLD-CCNC: 21 U/L — SIGNIFICANT CHANGE UP (ref 12–78)
ANION GAP SERPL CALC-SCNC: 7 MMOL/L — SIGNIFICANT CHANGE UP (ref 5–17)
AST SERPL-CCNC: 15 U/L — SIGNIFICANT CHANGE UP (ref 15–37)
BASOPHILS # BLD AUTO: 0.02 K/UL — SIGNIFICANT CHANGE UP (ref 0–0.2)
BASOPHILS NFR BLD AUTO: 0.6 % — SIGNIFICANT CHANGE UP (ref 0–2)
BILIRUB SERPL-MCNC: 0.2 MG/DL — SIGNIFICANT CHANGE UP (ref 0.2–1.2)
BUN SERPL-MCNC: 9 MG/DL — SIGNIFICANT CHANGE UP (ref 7–23)
CALCIUM SERPL-MCNC: 8.7 MG/DL — SIGNIFICANT CHANGE UP (ref 8.5–10.1)
CHLORIDE SERPL-SCNC: 110 MMOL/L — HIGH (ref 96–108)
CO2 SERPL-SCNC: 24 MMOL/L — SIGNIFICANT CHANGE UP (ref 22–31)
CREAT SERPL-MCNC: 0.84 MG/DL — SIGNIFICANT CHANGE UP (ref 0.5–1.3)
EGFR: 117 ML/MIN/1.73M2 — SIGNIFICANT CHANGE UP
EOSINOPHIL # BLD AUTO: 0.18 K/UL — SIGNIFICANT CHANGE UP (ref 0–0.5)
EOSINOPHIL NFR BLD AUTO: 5.1 % — SIGNIFICANT CHANGE UP (ref 0–6)
GLUCOSE SERPL-MCNC: 81 MG/DL — SIGNIFICANT CHANGE UP (ref 70–99)
HCT VFR BLD CALC: 36.1 % — LOW (ref 39–50)
HGB BLD-MCNC: 11.8 G/DL — LOW (ref 13–17)
IMM GRANULOCYTES NFR BLD AUTO: 0.3 % — SIGNIFICANT CHANGE UP (ref 0–0.9)
LYMPHOCYTES # BLD AUTO: 2.45 K/UL — SIGNIFICANT CHANGE UP (ref 1–3.3)
LYMPHOCYTES # BLD AUTO: 70 % — HIGH (ref 13–44)
MAGNESIUM SERPL-MCNC: 1.4 MG/DL — LOW (ref 1.6–2.6)
MCHC RBC-ENTMCNC: 29.4 PG — SIGNIFICANT CHANGE UP (ref 27–34)
MCHC RBC-ENTMCNC: 32.7 GM/DL — SIGNIFICANT CHANGE UP (ref 32–36)
MCV RBC AUTO: 90 FL — SIGNIFICANT CHANGE UP (ref 80–100)
MONOCYTES # BLD AUTO: 0.36 K/UL — SIGNIFICANT CHANGE UP (ref 0–0.9)
MONOCYTES NFR BLD AUTO: 10.3 % — SIGNIFICANT CHANGE UP (ref 2–14)
NEUTROPHILS # BLD AUTO: 0.48 K/UL — LOW (ref 1.8–7.4)
NEUTROPHILS NFR BLD AUTO: 13.7 % — LOW (ref 43–77)
PLATELET # BLD AUTO: 239 K/UL — SIGNIFICANT CHANGE UP (ref 150–400)
POTASSIUM SERPL-MCNC: 3.7 MMOL/L — SIGNIFICANT CHANGE UP (ref 3.5–5.3)
POTASSIUM SERPL-SCNC: 3.7 MMOL/L — SIGNIFICANT CHANGE UP (ref 3.5–5.3)
PROT SERPL-MCNC: 7.1 GM/DL — SIGNIFICANT CHANGE UP (ref 6–8.3)
RBC # BLD: 4.01 M/UL — LOW (ref 4.2–5.8)
RBC # FLD: 13.3 % — SIGNIFICANT CHANGE UP (ref 10.3–14.5)
SODIUM SERPL-SCNC: 141 MMOL/L — SIGNIFICANT CHANGE UP (ref 135–145)
WBC # BLD: 3.5 K/UL — LOW (ref 3.8–10.5)
WBC # FLD AUTO: 3.5 K/UL — LOW (ref 3.8–10.5)

## 2023-07-06 PROCEDURE — 99232 SBSQ HOSP IP/OBS MODERATE 35: CPT

## 2023-07-06 PROCEDURE — 99233 SBSQ HOSP IP/OBS HIGH 50: CPT

## 2023-07-06 RX ORDER — ACETAMINOPHEN 500 MG
1000 TABLET ORAL ONCE
Refills: 0 | Status: DISCONTINUED | OUTPATIENT
Start: 2023-07-06 | End: 2023-07-08

## 2023-07-06 RX ADMIN — Medication 650 MILLIGRAM(S): at 22:20

## 2023-07-06 RX ADMIN — QUETIAPINE FUMARATE 300 MILLIGRAM(S): 200 TABLET, FILM COATED ORAL at 21:25

## 2023-07-06 RX ADMIN — METHOCARBAMOL 750 MILLIGRAM(S): 500 TABLET, FILM COATED ORAL at 21:26

## 2023-07-06 RX ADMIN — Medication 3 MILLIGRAM(S): at 21:29

## 2023-07-06 RX ADMIN — PREGABALIN 1000 MICROGRAM(S): 225 CAPSULE ORAL at 10:59

## 2023-07-06 RX ADMIN — Medication 650 MILLIGRAM(S): at 22:50

## 2023-07-06 RX ADMIN — Medication 650 MILLIGRAM(S): at 14:45

## 2023-07-06 RX ADMIN — QUETIAPINE FUMARATE 100 MILLIGRAM(S): 200 TABLET, FILM COATED ORAL at 11:00

## 2023-07-06 RX ADMIN — Medication 650 MILLIGRAM(S): at 13:44

## 2023-07-06 RX ADMIN — MAGNESIUM OXIDE 400 MG ORAL TABLET 400 MILLIGRAM(S): 241.3 TABLET ORAL at 11:00

## 2023-07-06 RX ADMIN — METHOCARBAMOL 750 MILLIGRAM(S): 500 TABLET, FILM COATED ORAL at 06:11

## 2023-07-06 RX ADMIN — Medication 100 MILLIGRAM(S): at 21:25

## 2023-07-06 RX ADMIN — Medication 20 MILLIEQUIVALENT(S): at 21:26

## 2023-07-06 RX ADMIN — BICTEGRAVIR SODIUM, EMTRICITABINE, AND TENOFOVIR ALAFENAMIDE FUMARATE 1 TABLET(S): 30; 120; 15 TABLET ORAL at 11:00

## 2023-07-06 RX ADMIN — Medication 100 MILLIGRAM(S): at 10:59

## 2023-07-06 RX ADMIN — Medication 20 MILLIEQUIVALENT(S): at 11:00

## 2023-07-07 LAB
ANION GAP SERPL CALC-SCNC: 5 MMOL/L — SIGNIFICANT CHANGE UP (ref 5–17)
BUN SERPL-MCNC: 12 MG/DL — SIGNIFICANT CHANGE UP (ref 7–23)
CALCIUM SERPL-MCNC: 8.9 MG/DL — SIGNIFICANT CHANGE UP (ref 8.5–10.1)
CHLORIDE SERPL-SCNC: 110 MMOL/L — HIGH (ref 96–108)
CO2 SERPL-SCNC: 26 MMOL/L — SIGNIFICANT CHANGE UP (ref 22–31)
CREAT SERPL-MCNC: 0.77 MG/DL — SIGNIFICANT CHANGE UP (ref 0.5–1.3)
EGFR: 120 ML/MIN/1.73M2 — SIGNIFICANT CHANGE UP
GLUCOSE SERPL-MCNC: 103 MG/DL — HIGH (ref 70–99)
MAGNESIUM SERPL-MCNC: 1.7 MG/DL — SIGNIFICANT CHANGE UP (ref 1.6–2.6)
POTASSIUM SERPL-MCNC: 3.8 MMOL/L — SIGNIFICANT CHANGE UP (ref 3.5–5.3)
POTASSIUM SERPL-SCNC: 3.8 MMOL/L — SIGNIFICANT CHANGE UP (ref 3.5–5.3)
SODIUM SERPL-SCNC: 141 MMOL/L — SIGNIFICANT CHANGE UP (ref 135–145)

## 2023-07-07 PROCEDURE — 72158 MRI LUMBAR SPINE W/O & W/DYE: CPT | Mod: 26

## 2023-07-07 PROCEDURE — 72157 MRI CHEST SPINE W/O & W/DYE: CPT | Mod: 26

## 2023-07-07 PROCEDURE — 99233 SBSQ HOSP IP/OBS HIGH 50: CPT

## 2023-07-07 PROCEDURE — 99232 SBSQ HOSP IP/OBS MODERATE 35: CPT

## 2023-07-07 RX ORDER — MAGNESIUM OXIDE 400 MG ORAL TABLET 241.3 MG
400 TABLET ORAL DAILY
Refills: 0 | Status: DISCONTINUED | OUTPATIENT
Start: 2023-07-08 | End: 2023-07-08

## 2023-07-07 RX ORDER — SODIUM CHLORIDE 9 MG/ML
1000 INJECTION, SOLUTION INTRAVENOUS
Refills: 0 | Status: DISCONTINUED | OUTPATIENT
Start: 2023-07-07 | End: 2023-07-07

## 2023-07-07 RX ORDER — NYSTATIN 500MM UNIT
500000 POWDER (EA) MISCELLANEOUS EVERY 6 HOURS
Refills: 0 | Status: DISCONTINUED | OUTPATIENT
Start: 2023-07-07 | End: 2023-07-08

## 2023-07-07 RX ORDER — DEXAMETHASONE 0.5 MG/5ML
4 ELIXIR ORAL EVERY 6 HOURS
Refills: 0 | Status: DISCONTINUED | OUTPATIENT
Start: 2023-07-07 | End: 2023-07-07

## 2023-07-07 RX ORDER — ONDANSETRON 8 MG/1
4 TABLET, FILM COATED ORAL ONCE
Refills: 0 | Status: DISCONTINUED | OUTPATIENT
Start: 2023-07-07 | End: 2023-07-07

## 2023-07-07 RX ORDER — POTASSIUM CHLORIDE 20 MEQ
20 PACKET (EA) ORAL
Refills: 0 | Status: DISCONTINUED | OUTPATIENT
Start: 2023-07-07 | End: 2023-07-08

## 2023-07-07 RX ADMIN — Medication 3 MILLIGRAM(S): at 21:24

## 2023-07-07 RX ADMIN — METHOCARBAMOL 750 MILLIGRAM(S): 500 TABLET, FILM COATED ORAL at 21:22

## 2023-07-07 RX ADMIN — Medication 500000 UNIT(S): at 23:13

## 2023-07-07 RX ADMIN — METHOCARBAMOL 750 MILLIGRAM(S): 500 TABLET, FILM COATED ORAL at 15:32

## 2023-07-07 RX ADMIN — QUETIAPINE FUMARATE 100 MILLIGRAM(S): 200 TABLET, FILM COATED ORAL at 09:30

## 2023-07-07 RX ADMIN — PREGABALIN 1000 MICROGRAM(S): 225 CAPSULE ORAL at 09:29

## 2023-07-07 RX ADMIN — Medication 650 MILLIGRAM(S): at 21:55

## 2023-07-07 RX ADMIN — MAGNESIUM OXIDE 400 MG ORAL TABLET 400 MILLIGRAM(S): 241.3 TABLET ORAL at 09:32

## 2023-07-07 RX ADMIN — Medication 100 MILLIGRAM(S): at 21:21

## 2023-07-07 RX ADMIN — QUETIAPINE FUMARATE 300 MILLIGRAM(S): 200 TABLET, FILM COATED ORAL at 21:21

## 2023-07-07 RX ADMIN — Medication 500000 UNIT(S): at 18:18

## 2023-07-07 RX ADMIN — Medication 20 MILLIEQUIVALENT(S): at 09:51

## 2023-07-07 RX ADMIN — Medication 100 MILLIGRAM(S): at 09:30

## 2023-07-07 RX ADMIN — Medication 1 TABLET(S): at 11:08

## 2023-07-07 RX ADMIN — BICTEGRAVIR SODIUM, EMTRICITABINE, AND TENOFOVIR ALAFENAMIDE FUMARATE 1 TABLET(S): 30; 120; 15 TABLET ORAL at 09:28

## 2023-07-07 RX ADMIN — Medication 20 MILLIEQUIVALENT(S): at 09:29

## 2023-07-07 RX ADMIN — Medication 20 MILLIEQUIVALENT(S): at 21:20

## 2023-07-07 RX ADMIN — Medication 650 MILLIGRAM(S): at 21:22

## 2023-07-07 NOTE — CONSULT NOTE ADULT - SUBJECTIVE AND OBJECTIVE BOX
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 'Its unbearable and my legs feel unresponsive.' States he's tried to walk but is unable to and fell twice,   pain radiates to LE. Patient states he's compliant with pain meds Denies urinary or fecal incontinence. By chart review : progressive myelopathy for at least 5 years and has had multiple stays for physical therapy He has had extensive work up in the past - no compressive myelopathy, no demyelinating disease, no other cause found on labs tests, imaging  Recent admission to [facility] 06-17 to  for c/o increased back pain; AMA prior to discharge to rehab. Had oral thrush then as he had not consistently taken bactrim as outpt. Placed on fluconazole  Seen by neuro and ID. UDS +cocaine and +THC In ED /78  HR 72 RR 18   T 97.9  99% RA CT LS spine no fx oxycodone valium.       PAST MEDICAL HX  Asthma   Chronic sinusitis   Closed fracture of multiple ribs of right side, initial encounter   Cocaine abuse   GBS (Guillain Eckerman syndrome)   HIV (human immunodeficiency virus infection) from birth  Homeless.     PAST SURGICAL HX  History of orthopedic surgery left arm.     Meds: per reconciliation sheet, noted below  MEDICATIONS  (STANDING):  acetaminophen   IVPB .. 1000 milliGRAM(s) IV Intermittent once  bictegravir 50 mG/emtricitabine 200 mG/tenofovir alafenamide 25 mG (BIKTARVY) 1 Tablet(s) Oral daily  cyanocobalamin 1000 MICROGram(s) Oral daily  lidocaine   4% Patch 1 Patch Transdermal every 24 hours  magnesium oxide 400 milliGRAM(s) Oral daily  methocarbamol 750 milliGRAM(s) Oral every 8 hours  pregabalin 100 milliGRAM(s) Oral two times a day  QUEtiapine 100 milliGRAM(s) Oral daily  QUEtiapine 300 milliGRAM(s) Oral at bedtime  trimethoprim  160 mG/sulfamethoxazole 800 mG 1 Tablet(s) Oral <User Schedule>    Allergies    Toradol (Anaphylaxis; Swelling)  Toradol (Swelling)  Ceclor (Unknown)    Intolerances      Social: no smoking, no alcohol, no illegal drugs; no recent travel, no exposure to TB  FAMILY HISTORY:  FH: HIV infection (Mother)       no history of premature cardiovascular disease in first degree relatives    ROS: the patient denies fever, no chills, no HA, no dizziness, no sore throat, no blurry vision, no CP, no palpitations, no SOB, no cough, no abdominal pain, no diarrhea, no N/V, no dysuria, no leg pain, no claudication, no rash, no joint aches, no rectal pain or bleeding, no night sweats + back pain    All other systems reviewed and are negative    Vital Signs Last 24 Hrs  T(C): 36.7 (07 Jul 2023 08:13), Max: 37 (06 Jul 2023 23:06)  T(F): 98.1 (07 Jul 2023 08:13), Max: 98.6 (06 Jul 2023 23:06)  HR: 69 (07 Jul 2023 08:13) (69 - 91)  BP: 120/70 (07 Jul 2023 08:13) (120/70 - 135/86)  BP(mean): --  RR: 16 (07 Jul 2023 08:13) (16 - 18)  SpO2: 100% (07 Jul 2023 08:13) (98% - 100%)    Parameters below as of 07 Jul 2023 08:13  Patient On (Oxygen Delivery Method): room air      Daily     Daily     PE:  Constitutional: NAD  HEENT: NC/AT, EOMI, PERRLA, conjunctivae clear; ears and nose atraumatic; pharynx benign  Neck: supple; thyroid not palpable  Back: no tenderness  Respiratory: respiratory effort normal; clear to auscultation  Cardiovascular: S1S2 regular, no murmurs  Abdomen: soft, not tender, not distended, positive BS; liver and spleen WNL  Genitourinary: no suprapubic tenderness  Lymphatic: no LN palpable  Musculoskeletal: no muscle tenderness, no joint swelling or tenderness  Extremities: no pedal edema  Neurological/ Psychiatric: AxOx3, Judgement and insight normal;  moving all extremities  Skin: no rashes; no palpable lesions    Labs: all available labs reviewed                        11.8   3.50  )-----------( 239      ( 06 Jul 2023 08:15 )             36.1     07-07    141  |  110<H>  |  12  ----------------------------<  103<H>  3.8   |  26  |  0.77    Ca    8.9      07 Jul 2023 06:07  Mg     1.7     07-07    TPro  7.1  /  Alb  3.0<L>  /  TBili  0.2  /  DBili  x   /  AST  15  /  ALT  21  /  AlkPhos  55  07-06     LIVER FUNCTIONS - ( 06 Jul 2023 08:15 )  Alb: 3.0 g/dL / Pro: 7.1 gm/dL / ALK PHOS: 55 U/L / ALT: 21 U/L / AST: 15 U/L / GGT: x           Urinalysis Basic - ( 07 Jul 2023 06:07 )    Color: x / Appearance: x / SG: x / pH: x  Gluc: 103 mg/dL / Ketone: x  / Bili: x / Urobili: x   Blood: x / Protein: x / Nitrite: x   Leuk Esterase: x / RBC: x / WBC x   Sq Epi: x / Non Sq Epi: x / Bacteria: x          Radiology: all available radiological tests reviewed      ACC: [id number] EXAM:  CT LUMBAR SPINE   ORDERED BY: FINESSE SORTO     PROCEDURE DATE:  07/04/2023          INTERPRETATION:  HISTORY: Fall. Back pain.    COMPARISON: None.    TECHNIQUE: Axial noncontrast CT images of the lumbar spine were obtained.   Coronal and sagittal reconstructions were performed. Bone and soft tissue   windows were evaluated.    FINDINGS:    The lumbar lordosis is maintained. No prevertebral soft tissue swelling.   Vertebral body heights and alignment are maintained without compression   or subluxation.    Sacroiliac joints are patent bilaterally.    IMPRESSION:    No fracture.      Advanced directives addressed: full resuscitation

## 2023-07-07 NOTE — CONSULT NOTE ADULT - ASSESSMENT
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 'Its unbearable and my legs feel unresponsive.' States he's tried to walk but is unable to and fell twice,   pain radiates to LE. Patient states he's compliant with pain meds Denies urinary or fecal incontinence. By chart review : progressive myelopathy for at least 5 years and has had multiple stays for physical therapy He has had extensive work up in the past - no compressive myelopathy, no demyelinating disease, no other cause found on labs tests, imaging  Recent admission to [facility] 06-17 to  for c/o increased back pain; AMA prior to discharge to rehab. Had oral thrush then as he had not consistently taken bactrim as outpt. Placed on fluconazole  Seen by neuro and ID. UDS +cocaine and +THC In ED /78  HR 72 RR 18   T 97.9  99% RA CT LS spine no fx oxycodone valium.     1. Congenital HIV on HAART. Back pain.   - plan for MRI for further eval  - bactrim for pcp prophylaxis  - nystatin for thrush  - on biktarvy, continue with art  - neurology f/u noted  - fu cbc  - monitor temps  - f/u at [facility] I.D  - supportive care    2. other issues - care per medicine

## 2023-07-08 VITALS
HEART RATE: 70 BPM | SYSTOLIC BLOOD PRESSURE: 117 MMHG | DIASTOLIC BLOOD PRESSURE: 81 MMHG | RESPIRATION RATE: 17 BRPM | TEMPERATURE: 98 F | OXYGEN SATURATION: 100 %

## 2023-07-08 PROCEDURE — 99232 SBSQ HOSP IP/OBS MODERATE 35: CPT

## 2023-07-08 RX ADMIN — METHOCARBAMOL 750 MILLIGRAM(S): 500 TABLET, FILM COATED ORAL at 05:22

## 2023-07-08 RX ADMIN — MAGNESIUM OXIDE 400 MG ORAL TABLET 400 MILLIGRAM(S): 241.3 TABLET ORAL at 09:28

## 2023-07-08 RX ADMIN — PREGABALIN 1000 MICROGRAM(S): 225 CAPSULE ORAL at 09:28

## 2023-07-08 RX ADMIN — Medication 20 MILLIEQUIVALENT(S): at 09:27

## 2023-07-08 RX ADMIN — QUETIAPINE FUMARATE 100 MILLIGRAM(S): 200 TABLET, FILM COATED ORAL at 09:28

## 2023-07-08 RX ADMIN — Medication 500000 UNIT(S): at 05:22

## 2023-07-08 RX ADMIN — Medication 100 MILLIGRAM(S): at 09:28

## 2023-07-08 RX ADMIN — METHOCARBAMOL 750 MILLIGRAM(S): 500 TABLET, FILM COATED ORAL at 14:25

## 2023-07-08 RX ADMIN — BICTEGRAVIR SODIUM, EMTRICITABINE, AND TENOFOVIR ALAFENAMIDE FUMARATE 1 TABLET(S): 30; 120; 15 TABLET ORAL at 09:27

## 2023-07-08 RX ADMIN — Medication 500000 UNIT(S): at 12:27

## 2023-07-08 NOTE — PROGRESS NOTE ADULT - ASSESSMENT
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 Rheumatologic, Check ESR, CRP and SHAI    #HIV+   -ID consult appreciated  - Continue Bictegravir  - Bactrim for prophylaxis    #Fall day of ED presentation  CT no acute changes  +hx congenital HIV and intermittent compliance w meds by chart review AIDS myelopathy is possible  CD4 157  VL 775K on 06-18+  -Neurology appreciated  - MRI no changes from 9/2022  -Patient agreeable to consider acute rehab    #Chronic pain  #Spastic paraparesis  - Continue Robaxin  - Continue lyrica    # Bipolar  - Continue seroquel        
33 yo man who presents with acute worsening of LBP, LE weakness, and falls with inability to walk.  He has a history of congenital HIV, chronic back pain, substance use, non-compliance to HIV meds, domiciled status, HIV myelopathy, asthma, multiple prior ED visits/ admissions for fluctuating/ worsening LE weakness and urinary retention, recent admission to [facility] on 6/17/23 for back pain, LE weakness (L worse than R), saddle anesthesia, incontinence. Had imaging in the past here, multiple spine MRIs without cord/cauda equina compression / abnormal enhancement or lesions, LP in 2021, and EMG/NCS.  Rheum work up for myelopathy  was negative.    Lab significant for viral load most recently was 775K, CD4 157 in 6/2023.      #Acute exacerbation of chronic LBP w leg weakness; myelopathy  MRI T/L spine with and without contrast to be done 7/7.        #HIV+   management per medicine team/ID      #Fall  Physical therapy.    will f/u after MRI  
31 yo man who presents with acute worsening of LBP, LE weakness, and falls with inability to walk.  He has a history of congenital HIV, chronic back pain, substance use, non-compliance to HIV meds, domiciled status, HIV myelopathy, asthma, multiple prior ED visits/ admissions for fluctuating/ worsening LE weakness and urinary retention, recent admission to [facility] on 6/17/23 for back pain, LE weakness (L worse than R), saddle anesthesia, incontinence. Had imaging in the past here, multiple spine MRIs without cord/cauda equina compression / abnormal enhancement or lesions, LP in 2021, and EMG/NCS.  Rheum work up for myelopathy  was negative.    Lab significant for viral load most recently was 775K, CD4 157 in 6/2023.      #Acute exacerbation of chronic LBP w leg weakness; myelopathy  MRI T/L spine with and without contrast to be done this afternoon with anesthesia.  If there is no change compared to prior, he can go to rehab.  Physical therapy.      #HIV+   management per medicine team/ID          Will f/u MRI results.
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 antispasmodic agents : flexeril or robaxin effective; baclofen no  methacarbamol 750 mg q 8 hrs  2. lyrica 100 mg BID  3. quetiapine 100 mg q AM  4.     "           300 mg q HS      #Substance use  1. SBIRT  UDS + cocaine and +THC
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 antispasmodic agents : flexeril or robaxin effective; baclofen no  methacarbamol 750 mg q 8 hrs  2. lyrica 100 mg BID  3. quetiapine 100 mg q AM  4.     "           300 mg q HS      #Substance use  1. SBIRT  UDS + cocaine and +THC  
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 antispasmodic agents : flexeril or robaxin effective; baclofen no  methacarbamol 750 mg q 8 hrs  2. lyrica 100 mg BID  3. quetiapine 100 mg q AM  4.     "           300 mg q HS      #Substance use  1. SBIRT  UDS + cocaine and +THC

## 2023-07-08 NOTE — PROGRESS NOTE ADULT - REASON FOR ADMISSION
intractable back pain

## 2023-07-08 NOTE — PROGRESS NOTE ADULT - SUBJECTIVE AND OBJECTIVE BOX
HOSPITALIST ATTENDING PROGRESS NOTE    Chart and meds reviewed.  Patient seen and examined.    CC/ HPI Patient is a 33y old  Male who presents with a chief complaint of intractable back pain (06 Jul 2023 12:40)      Subjective: Patient states he was able to walk to bathroom with assistance. Was upset with PT and wanted to a different PT as he said he "didn't vibe" with him. Had breakfast this morning and was unable to go for MRI.     All other systems reviewed and found to be negative with the exception of what has been described above.    MEDICATIONS:  MEDICATIONS  (STANDING):  acetaminophen   IVPB .. 1000 milliGRAM(s) IV Intermittent once  bictegravir 50 mG/emtricitabine 200 mG/tenofovir alafenamide 25 mG (BIKTARVY) 1 Tablet(s) Oral daily  cyanocobalamin 1000 MICROGram(s) Oral daily  lidocaine   4% Patch 1 Patch Transdermal every 24 hours  magnesium oxide 400 milliGRAM(s) Oral daily  methocarbamol 750 milliGRAM(s) Oral every 8 hours  potassium chloride    Tablet ER 20 milliEquivalent(s) Oral two times a day  pregabalin 100 milliGRAM(s) Oral two times a day  QUEtiapine 300 milliGRAM(s) Oral at bedtime  QUEtiapine 100 milliGRAM(s) Oral daily  trimethoprim  160 mG/sulfamethoxazole 800 mG 1 Tablet(s) Oral <User Schedule>      Vital Signs Last 24 Hrs  T(C): 36.7 (06 Jul 2023 15:56), Max: 36.7 (06 Jul 2023 15:56)  T(F): 98 (06 Jul 2023 15:56), Max: 98 (06 Jul 2023 15:56)  HR: 78 (06 Jul 2023 15:56) (68 - 78)  BP: 135/86 (06 Jul 2023 15:56) (124/80 - 135/86)  BP(mean): --  RR: 17 (06 Jul 2023 15:56) (16 - 17)  SpO2: 98% (06 Jul 2023 15:56) (97% - 100%)    Parameters below as of 06 Jul 2023 15:56  Patient On (Oxygen Delivery Method): room air        I&O's Summary    05 Jul 2023 07:01  -  06 Jul 2023 07:00  --------------------------------------------------------  IN: 0 mL / OUT: 703 mL / NET: -703 mL    06 Jul 2023 07:01  -  06 Jul 2023 20:24  --------------------------------------------------------  IN: 800 mL / OUT: 0 mL / NET: 800 mL        CAPILLARY BLOOD GLUCOSE          PHYSICAL EXAM:    Constitutional: NAD, awake and alert, well-developed  HEENT: PERR, EOMI, Normal Hearing, MMM  Neck: Soft and supple, No LAD, No JVD  Respiratory: Breath sounds are clear bilaterally, No wheezing, rales or rhonchi  Cardiovascular: S1 and S2, regular rate and rhythm, no Murmurs, gallops or rubs  Gastrointestinal: Bowel Sounds present, soft, nontender, nondistended, no guarding, no rebound  Extremities: No peripheral edema  Vascular: 2+ peripheral pulses  Neurological: A/O x 3, spastic LE  Musculoskeletal: 5/5 strength b/l upper and lower extremities  Skin: No rashes        LABS: All Labs Reviewed:                        11.8   3.50  )-----------( 239      ( 06 Jul 2023 08:15 )             36.1     07-06    141  |  110<H>  |  9   ----------------------------<  81  3.7   |  24  |  0.84    Ca    8.7      06 Jul 2023 08:15  Mg     1.4     07-06    TPro  7.1  /  Alb  3.0<L>  /  TBili  0.2  /  DBili  x   /  AST  15  /  ALT  21  /  AlkPhos  55  07-06          Blood Culture:     RADIOLOGY/EKG:    DVT PPX:    ADVANCED DIRECTIVE:    DISPOSITION:
HOSPITALIST ATTENDING PROGRESS NOTE    Chart and meds reviewed.  Patient seen and examined.    CC/ HPI Patient is a 33y old  Male who presents with a chief complaint of intractable back pain (07 Jul 2023 10:37)      Subjective:     All other systems reviewed and found to be negative with the exception of what has been described above.    MEDICATIONS:  MEDICATIONS  (STANDING):  acetaminophen   IVPB .. 1000 milliGRAM(s) IV Intermittent once  bictegravir 50 mG/emtricitabine 200 mG/tenofovir alafenamide 25 mG (BIKTARVY) 1 Tablet(s) Oral daily  cyanocobalamin 1000 MICROGram(s) Oral daily  lidocaine   4% Patch 1 Patch Transdermal every 24 hours  magnesium oxide 400 milliGRAM(s) Oral daily  methocarbamol 750 milliGRAM(s) Oral every 8 hours  nystatin    Suspension 173505 Unit(s) Oral every 6 hours  pregabalin 100 milliGRAM(s) Oral two times a day  QUEtiapine 100 milliGRAM(s) Oral daily  QUEtiapine 300 milliGRAM(s) Oral at bedtime  trimethoprim  160 mG/sulfamethoxazole 800 mG 1 Tablet(s) Oral <User Schedule>      Vital Signs Last 24 Hrs  T(C): 36.5 (07 Jul 2023 17:36), Max: 37 (06 Jul 2023 23:06)  T(F): 97.7 (07 Jul 2023 17:36), Max: 98.6 (06 Jul 2023 23:06)  HR: 88 (07 Jul 2023 17:36) (68 - 91)  BP: 127/75 (07 Jul 2023 17:36) (112/68 - 127/75)  BP(mean): --  RR: 17 (07 Jul 2023 17:36) (11 - 18)  SpO2: 100% (07 Jul 2023 17:36) (98% - 100%)    Parameters below as of 07 Jul 2023 17:36  Patient On (Oxygen Delivery Method): room air        I&O's Summary    06 Jul 2023 07:01  -  07 Jul 2023 07:00  --------------------------------------------------------  IN: 800 mL / OUT: 0 mL / NET: 800 mL    07 Jul 2023 07:01  -  07 Jul 2023 18:54  --------------------------------------------------------  IN: 75 mL / OUT: 350 mL / NET: -275 mL        CAPILLARY BLOOD GLUCOSE          PHYSICAL EXAM:    Constitutional: NAD, awake and alert, well-developed  HEENT: PERR, EOMI, Normal Hearing, MMM  Neck: Soft and supple, No LAD, No JVD  Respiratory: Breath sounds are clear bilaterally, No wheezing, rales or rhonchi  Cardiovascular: S1 and S2, regular rate and rhythm, no Murmurs, gallops or rubs  Gastrointestinal: Bowel Sounds present, soft, nontender, nondistended, no guarding, no rebound  Extremities: No peripheral edema  Vascular: 2+ peripheral pulses  Neurological: A/O x 3, spastic lower extremities  Musculoskeletal: 5/5 strength b/l upper and lower extremities  Skin: No rashes        LABS: All Labs Reviewed:                        11.8   3.50  )-----------( 239      ( 06 Jul 2023 08:15 )             36.1     07-07    141  |  110<H>  |  12  ----------------------------<  103<H>  3.8   |  26  |  0.77    Ca    8.9      07 Jul 2023 06:07  Mg     1.7     07-07    TPro  7.1  /  Alb  3.0<L>  /  TBili  0.2  /  DBili  x   /  AST  15  /  ALT  21  /  AlkPhos  55  07-06          Blood Culture:     RADIOLOGY/EKG:    < from: MR Thoracic Spine w/wo IV Cont (07.07.23 @ 13:09) >  IMPRESSION:  No interval change from prior thoracic and lumbar spine MRIs of 9/2/2022  No cord or cauda equina compression. No cord signal abnormality or   abnormal enhancement.    --- End of Report ---    < end of copied text >      DVT PPX:    ADVANCED DIRECTIVE:    DISPOSITION:
Interval History:  7/7/23: No new complaints. Continues to report weakness.    MEDICATIONS  (STANDING):  acetaminophen   IVPB .. 1000 milliGRAM(s) IV Intermittent once  bictegravir 50 mG/emtricitabine 200 mG/tenofovir alafenamide 25 mG (BIKTARVY) 1 Tablet(s) Oral daily  cyanocobalamin 1000 MICROGram(s) Oral daily  lidocaine   4% Patch 1 Patch Transdermal every 24 hours  magnesium oxide 400 milliGRAM(s) Oral daily  methocarbamol 750 milliGRAM(s) Oral every 8 hours  potassium chloride    Tablet ER 20 milliEquivalent(s) Oral two times a day  pregabalin 100 milliGRAM(s) Oral two times a day  QUEtiapine 100 milliGRAM(s) Oral daily  QUEtiapine 300 milliGRAM(s) Oral at bedtime  trimethoprim  160 mG/sulfamethoxazole 800 mG 1 Tablet(s) Oral <User Schedule>    MEDICATIONS  (PRN):  acetaminophen     Tablet .. 650 milliGRAM(s) Oral every 6 hours PRN Temp greater or equal to 38C (100.4F), Mild Pain (1 - 3)  aluminum hydroxide/magnesium hydroxide/simethicone Suspension 30 milliLiter(s) Oral every 4 hours PRN Dyspepsia  melatonin 3 milliGRAM(s) Oral at bedtime PRN Insomnia      Allergies    Toradol (Anaphylaxis; Swelling)  Toradol (Swelling)  Ceclor (Unknown)    Intolerances        PHYSICAL EXAM:  Vital Signs Last 24 Hrs  T(F): 98.1 (07-07-23 @ 08:13)  HR: 69 (07-07-23 @ 08:13)  BP: 120/70 (07-07-23 @ 08:13)  RR: 16 (07-07-23 @ 08:13)    Neurological exam:  HF: Somnolent, no aphasia  CN: keeps eyes closed, facial sensation normal, no NLFD, tongue midline, SCM equal bilaterally  Motor: No pronator drift, Strength 5/5 in upper ext, RLE: Hip flexion 4-/5, knee flexion 4/5, dorsiflexion 4/5, plantar flextion 5-/5  LLE: Hip flexion - poor effort bilaterally,  knee flexion 3/5 on left, 4/5 on right, dorsiflexion 3+/5, plantar flexion 4+/5   Sens: Decreased sensation LLE b/l distally.   Reflexes: Symmetric and normal . Right BJ 2+, BR 2+, KJ 3+, AJ 3+,Left BJ 2+, BR 2+, KJ 3+, AJ 1+  , downgoing toes b/l, neg Mclain sign, no clonus  Coord:  No FNFA, dysmetria,       LABS:                        11.8   3.50  )-----------( 239      ( 06 Jul 2023 08:15 )             36.1     07-07    141  |  110<H>  |  12  ----------------------------<  103<H>  3.8   |  26  |  0.77    Ca    8.9      07 Jul 2023 06:07  Mg     1.7     07-07    TPro  7.1  /  Alb  3.0<L>  /  TBili  0.2  /  DBili  x   /  AST  15  /  ALT  21  /  AlkPhos  55  07-06      Urinalysis Basic - ( 07 Jul 2023 06:07 )    Color: x / Appearance: x / SG: x / pH: x  Gluc: 103 mg/dL / Ketone: x  / Bili: x / Urobili: x   Blood: x / Protein: x / Nitrite: x   Leuk Esterase: x / RBC: x / WBC x   Sq Epi: x / Non Sq Epi: x / Bacteria: x        RADIOLOGY & ADDITIONAL STUDIES:    CT Lumbar Spine No Cont 7/4/23:  The lumbar lordosis is maintained. No prevertebral soft tissue swelling.   Vertebral body heights and alignment are maintained without compression   or subluxation.  Sacroiliac joints are patent bilaterally.    IMPRESSION:  No fracture.            
Interval History:  7/6/23: Reports pain in his lower extremities.  Agreed to do MRI yesterday after Xanax. However, after receiving Xanax he refused.  Says he was able to walk ot the bathroom with PT.    MEDICATIONS  (STANDING):  bictegravir 50 mG/emtricitabine 200 mG/tenofovir alafenamide 25 mG (BIKTARVY) 1 Tablet(s) Oral daily  cyanocobalamin 1000 MICROGram(s) Oral daily  lidocaine   4% Patch 1 Patch Transdermal every 24 hours  magnesium oxide 400 milliGRAM(s) Oral daily  methocarbamol 750 milliGRAM(s) Oral every 8 hours  potassium chloride    Tablet ER 20 milliEquivalent(s) Oral two times a day  pregabalin 100 milliGRAM(s) Oral two times a day  QUEtiapine 100 milliGRAM(s) Oral daily  QUEtiapine 300 milliGRAM(s) Oral at bedtime  trimethoprim  160 mG/sulfamethoxazole 800 mG 1 Tablet(s) Oral <User Schedule>    MEDICATIONS  (PRN):  acetaminophen     Tablet .. 650 milliGRAM(s) Oral every 6 hours PRN Temp greater or equal to 38C (100.4F), Mild Pain (1 - 3)  aluminum hydroxide/magnesium hydroxide/simethicone Suspension 30 milliLiter(s) Oral every 4 hours PRN Dyspepsia  melatonin 3 milliGRAM(s) Oral at bedtime PRN Insomnia      Allergies    Toradol (Anaphylaxis; Swelling)  Toradol (Swelling)  Ceclor (Unknown)    Intolerances        PHYSICAL EXAM:  Vital Signs Last 24 Hrs  T(F): 97.9 (07-06-23 @ 08:30)  HR: 73 (07-06-23 @ 08:30)  BP: 124/80 (07-06-23 @ 08:30)  RR: 16 (07-06-23 @ 08:30)      Neurological exam:  HF: A x O x 3. Appropriately interactive, normal affect. Speech fluent, No Aphasia or paraphasic errors.   CN: JOCELYN, EOMI, VFF, facial sensation normal, no NLFD, tongue midline, SCM equal bilaterally  Motor: No pronator drift, Strength 5/5 in upper ext, RLE: Hip flexion 4-/5, knee flexion 4/5, dorsiflexion 4/5, plantar flextion 5-/5  LLE: Hip flexion - poor effort bilaterally,  knee flexion 3/5 on left, 4/5 on right, dorsiflexion 3+/5, plantar flexion 4+/5   Sens: Decreased sensation LLE b/l distally.   Reflexes: Symmetric and normal . Right BJ 2+, BR 2+, KJ 3+, AJ 3+,Left BJ 2+, BR 2+, KJ 3+, AJ 1+  , downgoing toes b/l, neg Mclain sign, no clonus  Coord:  No FNFA, dysmetria, AMAN intact   Gait/Balance: not tested      LABS:                        11.8   3.50  )-----------( 239      ( 06 Jul 2023 08:15 )             36.1     07-06    141  |  110<H>  |  9   ----------------------------<  81  3.7   |  24  |  0.84    Ca    8.7      06 Jul 2023 08:15  Mg     1.4     07-06    TPro  7.1  /  Alb  3.0<L>  /  TBili  0.2  /  DBili  x   /  AST  15  /  ALT  21  /  AlkPhos  55  07-06      Urinalysis Basic - ( 06 Jul 2023 08:15 )    Color: x / Appearance: x / SG: x / pH: x  Gluc: 81 mg/dL / Ketone: x  / Bili: x / Urobili: x   Blood: x / Protein: x / Nitrite: x   Leuk Esterase: x / RBC: x / WBC x   Sq Epi: x / Non Sq Epi: x / Bacteria: x        RADIOLOGY & ADDITIONAL STUDIES:    CT Lumbar Spine No Cont 7/4/23:  The lumbar lordosis is maintained. No prevertebral soft tissue swelling.   Vertebral body heights and alignment are maintained without compression   or subluxation.  Sacroiliac joints are patent bilaterally.    IMPRESSION:  No fracture.            
HOSPITALIST ATTENDING PROGRESS NOTE    Chart and meds reviewed.  Patient seen and examined.    CC/ HPI Patient is a 33y old  Male who presents with a chief complaint of HIV disease     (05 Jul 2023 13:15)      Subjective: Patient seen and examined at bedside. Extremely lethargic and falling asleep. Arousable, but easily falls asleep. Refused to go to MRI and requested anesthesia so he could be "knocked out".  Says that he will "bug out" if he is awake when he goes to the MRI. Agreed to go to MRI after taking Xanax, but then refused after receiving Xanax. States was able to participate in Physical Therapy, but was only able to shuffle back and forth at the bedside.     All other systems reviewed and found to be negative with the exception of what has been described above.    MEDICATIONS:  MEDICATIONS  (STANDING):  bictegravir 50 mG/emtricitabine 200 mG/tenofovir alafenamide 25 mG (BIKTARVY) 1 Tablet(s) Oral daily  cyanocobalamin 1000 MICROGram(s) Oral daily  lidocaine   4% Patch 1 Patch Transdermal every 24 hours  methocarbamol 750 milliGRAM(s) Oral every 8 hours  potassium chloride    Tablet ER 20 milliEquivalent(s) Oral two times a day  pregabalin 100 milliGRAM(s) Oral two times a day  QUEtiapine 100 milliGRAM(s) Oral daily  QUEtiapine 300 milliGRAM(s) Oral at bedtime  trimethoprim  160 mG/sulfamethoxazole 800 mG 1 Tablet(s) Oral <User Schedule>      Vital Signs Last 24 Hrs  T(C): 36.6 (05 Jul 2023 15:44), Max: 36.6 (04 Jul 2023 19:05)  T(F): 97.9 (05 Jul 2023 15:44), Max: 97.9 (04 Jul 2023 19:05)  HR: 70 (05 Jul 2023 15:44) (66 - 76)  BP: 125/78 (05 Jul 2023 15:44) (110/61 - 137/78)  BP(mean): --  RR: 18 (05 Jul 2023 15:44) (17 - 19)  SpO2: 99% (05 Jul 2023 15:44) (97% - 100%)    Parameters below as of 05 Jul 2023 15:44  Patient On (Oxygen Delivery Method): room air        I&O's Summary      CAPILLARY BLOOD GLUCOSE          PHYSICAL EXAM:    Constitutional: NAD, awake and alert, well-developed  HEENT: PERR, EOMI, Normal Hearing, MMM  Neck: Soft and supple, No LAD, No JVD  Respiratory: Breath sounds are clear bilaterally, No wheezing, rales or rhonchi  Cardiovascular: S1 and S2, regular rate and rhythm, no Murmurs, gallops or rubs  Gastrointestinal: Bowel Sounds present, soft, nontender, nondistended, no guarding, no rebound  Extremities: No peripheral edema  Vascular: 2+ peripheral pulses  Neurological: A/O x 3, spastic LE  Musculoskeletal: abnormal gait with decreased balance  Skin: No rashes        LABS: All Labs Reviewed:                        11.5   3.63  )-----------( 208      ( 05 Jul 2023 07:10 )             34.7     07-05    139  |  111<H>  |  16  ----------------------------<  112<H>  3.2<L>   |  27  |  0.79    Ca    8.6      05 Jul 2023 07:10    TPro  8.4<H>  /  Alb  3.7  /  TBili  0.6  /  DBili  x   /  AST  19  /  ALT  27  /  AlkPhos  58  07-04          Blood Culture:     RADIOLOGY/EKG:    DVT PPX:    ADVANCED DIRECTIVE:    DISPOSITION:
HOSPITALIST ATTENDING PROGRESS NOTE    Chart and meds reviewed.  Patient seen and examined.    CC: LE weakness    Subjective: Still with LE weakness and unsteady gait. No acute issues overnight. No fever.     All other systems reviewed and found to be negative with the exception of what has been described above.    MEDICATIONS  (STANDING):  acetaminophen   IVPB .. 1000 milliGRAM(s) IV Intermittent once  bictegravir 50 mG/emtricitabine 200 mG/tenofovir alafenamide 25 mG (BIKTARVY) 1 Tablet(s) Oral daily  cyanocobalamin 1000 MICROGram(s) Oral daily  lidocaine   4% Patch 1 Patch Transdermal every 24 hours  magnesium oxide 400 milliGRAM(s) Oral daily  methocarbamol 750 milliGRAM(s) Oral every 8 hours  nystatin    Suspension [id number] Unit(s) Oral every 6 hours  potassium chloride    Tablet ER 20 milliEquivalent(s) Oral two times a day  pregabalin 100 milliGRAM(s) Oral two times a day  QUEtiapine 100 milliGRAM(s) Oral daily  QUEtiapine 300 milliGRAM(s) Oral at bedtime  trimethoprim  160 mG/sulfamethoxazole 800 mG 1 Tablet(s) Oral <User Schedule>    MEDICATIONS  (PRN):  acetaminophen     Tablet .. 650 milliGRAM(s) Oral every 6 hours PRN Temp greater or equal to 38C (100.4F), Mild Pain (1 - 3)  aluminum hydroxide/magnesium hydroxide/simethicone Suspension 30 milliLiter(s) Oral every 4 hours PRN Dyspepsia  melatonin 3 milliGRAM(s) Oral at bedtime PRN Insomnia      VITALS:  T(F): 98.2 (07-08-23 @ 07:37), Max: 98.2 (07-08-23 @ 07:37)  HR: 70 (07-08-23 @ 07:37) (68 - 88)  BP: 117/81 (07-08-23 @ 07:37) (112/68 - 141/82)  RR: 17 (07-08-23 @ 07:37) (11 - 18)  SpO2: 100% (07-08-23 @ 07:37) (98% - 100%)  Wt(kg): --    I&O's Summary    07 Jul 2023 07:01  -  08 Jul 2023 07:00  --------------------------------------------------------  IN: 75 mL / OUT: 550 mL / NET: -475 mL        CAPILLARY BLOOD GLUCOSE          PHYSICAL EXAM:  GEN: NAD  HEENT:  pupils equal and reactive, EOMI, no oropharyngeal lesions, erythema, exudates, oral thrush  NECK:   supple, no carotid bruits, no palpable lymph nodes, no thyromegaly  CV:  +S1, +S2, regular, no murmurs or rubs  RESP:   lungs clear to auscultation bilaterally, no wheezing, rales, rhonchi, good air entry bilaterally  BREAST:  not examined  GI:  abdomen soft, non-tender, non-distended, normal BS, no bruits, no abdominal masses, no palpable masses  RECTAL:  not examined  :  not examined  MSK:   normal muscle tone, no atrophy, no rigidity, no contractions  EXT:  no clubbing, no cyanosis, no edema, no calf pain, swelling or erythema  VASCULAR:  pulses equal and symmetric in the upper and lower extremities  NEURO:  AAOX3, no focal neurological deficits, follows all commands, able to move extremities spontaneously  SKIN:  no ulcers, lesions or rashes    LABS:        07-07    141  |  110<H>  |  12  ----------------------------<  103<H>  3.8   |  26  |  0.77    Ca    8.9      07 Jul 2023 06:07  Mg     1.7     07-07    Urinalysis Basic - ( 07 Jul 2023 06:07 )  Color: x / Appearance: x / SG: x / pH: x  Gluc: 103 mg/dL / Ketone: x  / Bili: x / Urobili: x   Blood: x / Protein: x / Nitrite: x   Leuk Esterase: x / RBC: x / WBC x   Sq Epi: x / Non Sq Epi: x / Bacteria: x      < from: MR Thoracic Spine w/wo IV Cont (07.07.23 @ 13:09) >  IMPRESSION:  No interval change from prior thoracic and lumbar spine MRIs of 9/2/2022  No cord or cauda equina compression. No cord signal abnormality or   abnormal enhancement.    --- End of Report ---    < end of copied text >

## 2023-07-11 ENCOUNTER — EMERGENCY (EMERGENCY)
Facility: HOSPITAL | Age: 33
LOS: 0 days | Discharge: HOME | End: 2023-07-11
Attending: EMERGENCY MEDICINE
Payer: MEDICAID

## 2023-07-11 VITALS
DIASTOLIC BLOOD PRESSURE: 91 MMHG | RESPIRATION RATE: 17 BRPM | HEIGHT: 71 IN | HEART RATE: 88 BPM | TEMPERATURE: 98 F | SYSTOLIC BLOOD PRESSURE: 143 MMHG | WEIGHT: 160.06 LBS | OXYGEN SATURATION: 99 %

## 2023-07-11 DIAGNOSIS — G89.29 OTHER CHRONIC PAIN: ICD-10-CM

## 2023-07-11 DIAGNOSIS — M79.632 PAIN IN LEFT FOREARM: ICD-10-CM

## 2023-07-11 DIAGNOSIS — B20 HUMAN IMMUNODEFICIENCY VIRUS [HIV] DISEASE: ICD-10-CM

## 2023-07-11 DIAGNOSIS — S02.40FA ZYGOMATIC FRACTURE, LEFT SIDE, INITIAL ENCOUNTER FOR CLOSED FRACTURE: ICD-10-CM

## 2023-07-11 DIAGNOSIS — J45.909 UNSPECIFIED ASTHMA, UNCOMPLICATED: ICD-10-CM

## 2023-07-11 DIAGNOSIS — Z98.890 OTHER SPECIFIED POSTPROCEDURAL STATES: Chronic | ICD-10-CM
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
## 2023-07-11 DIAGNOSIS — Z88.1 ALLERGY STATUS TO OTHER ANTIBIOTIC AGENTS STATUS: ICD-10-CM

## 2023-07-11 DIAGNOSIS — S02.2XXA FRACTURE OF NASAL BONES, INITIAL ENCOUNTER FOR CLOSED FRACTURE: ICD-10-CM

## 2023-07-11 DIAGNOSIS — Z88.6 ALLERGY STATUS TO ANALGESIC AGENT: ICD-10-CM

## 2023-07-11 DIAGNOSIS — Z86.59 PERSONAL HISTORY OF OTHER MENTAL AND BEHAVIORAL DISORDERS: ICD-10-CM

## 2023-07-11 DIAGNOSIS — G61.0 GUILLAIN-BARRE SYNDROME: ICD-10-CM

## 2023-07-11 DIAGNOSIS — S50.12XA CONTUSION OF LEFT FOREARM, INITIAL ENCOUNTER: ICD-10-CM
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
## 2023-07-11 PROCEDURE — 76376 3D RENDER W/INTRP POSTPROCES: CPT | Mod: 26

## 2023-07-11 PROCEDURE — 72125 CT NECK SPINE W/O DYE: CPT | Mod: MA

## 2023-07-11 PROCEDURE — 73090 X-RAY EXAM OF FOREARM: CPT | Mod: 26,LT

## 2023-07-11 PROCEDURE — 70450 CT HEAD/BRAIN W/O DYE: CPT | Mod: MA

## 2023-07-11 PROCEDURE — 73090 X-RAY EXAM OF FOREARM: CPT | Mod: LT

## 2023-07-11 PROCEDURE — 70486 CT MAXILLOFACIAL W/O DYE: CPT | Mod: MA

## 2023-07-11 PROCEDURE — 99285 EMERGENCY DEPT VISIT HI MDM: CPT

## 2023-07-11 PROCEDURE — 70450 CT HEAD/BRAIN W/O DYE: CPT | Mod: 26,MA

## 2023-07-11 PROCEDURE — 70486 CT MAXILLOFACIAL W/O DYE: CPT | Mod: 26,MA

## 2023-07-11 PROCEDURE — 72125 CT NECK SPINE W/O DYE: CPT | Mod: 26,MA

## 2023-07-11 PROCEDURE — 99284 EMERGENCY DEPT VISIT MOD MDM: CPT | Mod: 25

## 2023-07-11 PROCEDURE — 76376 3D RENDER W/INTRP POSTPROCES: CPT

## 2023-07-11 RX ORDER — ACETAMINOPHEN 500 MG
650 TABLET ORAL ONCE
Refills: 0 | Status: COMPLETED | OUTPATIENT
Start: 2023-07-11 | End: 2023-07-11

## 2023-07-11 RX ADMIN — Medication 650 MILLIGRAM(S): at 03:00

## 2023-07-11 NOTE — ED PROVIDER NOTE - NSICDXPASTMEDICALHX_GEN_ALL_CORE_FT
PAST MEDICAL HISTORY:  Asthma     Chronic sinusitis     Closed fracture of multiple ribs of right side, initial encounter     Cocaine abuse     GBS (Guillain [place] syndrome)     HIV (human immunodeficiency virus infection) from birth    Homeless
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
## 2023-07-11 NOTE — ED PROVIDER NOTE - CLINICAL SUMMARY MEDICAL DECISION MAKING FREE TEXT BOX
Plan: Pain control as needed, CT head, CT of the face to rule out facial fractures to include orbital fracture, CT of the cervical spine, x-ray of the left forearm and reassess.

## 2023-07-11 NOTE — ED PROVIDER NOTE - PHYSICAL EXAMINATION
Musculoskeletal exam of left upper extremity.  Patient has a 3 cm skin tear/abrasion of the medial aspect of the left forearm and tenderness to palpation.  Patient has decreased supination and pronation of the left forearm.

## 2023-07-11 NOTE — ED PROVIDER NOTE - NSFOLLOWUPINSTRUCTIONS_ED_ALL_ED_FT
Head Injury    WHAT YOU NEED TO KNOW:    A head injury is most often caused by a blow to the head. This may occur from a fall, bicycle injury, sports injury, being struck in the head, or a motor vehicle accident.     DISCHARGE INSTRUCTIONS:    Call 911 or have someone else call for any of the following:     You cannot be woken.      You have a seizure.      You stop responding to others or you faint.      You have blurry or double vision.      Your speech becomes slurred or confused.      You have arm or leg weakness, loss of feeling, or new problems with coordination.      Your pupils are larger than usual or one pupil is a different size than the other.       You have blood or clear fluid coming out of your ears or nose.    Return to the emergency department if:     You have repeated or forceful vomiting.      You feel confused.      Your headache gets worse or becomes severe.      You or someone caring for you notices that you are harder to wake than usual.    Contact your healthcare provider if:     Your symptoms last longer than 6 weeks after the injury.      You have questions or concerns about your condition or care.    Medicines:     Acetaminophen decreases pain. Acetaminophen is available without a doctor's order. Ask how much to take and how often to take it. Follow directions. Acetaminophen can cause liver damage if not taken correctly.      Take your medicine as directed. Contact your healthcare provider if you think your medicine is not helping or if you have side effects. Tell him or her if you are allergic to any medicine. Keep a list of the medicines, vitamins, and herbs you take. Include the amounts, and when and why you take them. Bring the list or the pill bottles to follow-up visits. Carry your medicine list with you in case of an emergency.    Self-care:     Rest or do quiet activities for 24 to 48 hours. Limit your time watching TV, using the computer, or doing tasks that require a lot of thinking. Slowly return to your normal activities as directed. Do not play sports or do activities that may cause you to get hit in the head. Ask your healthcare provider when you can return to sports.       Apply ice on your head for 15 to 20 minutes every hour or as directed. Use an ice pack, or put crushed ice in a plastic bag. Cover it with a towel before you apply it to your skin. Ice helps prevent tissue damage and decreases swelling and pain.       Have someone stay with you for 24 hours or as directed. This person can monitor you for complications and call 911. When you are awake the person should ask you a few questions to see if you are thinking clearly. An example would be to ask your name or your address.     Prevent another head injury:     Wear a helmet that fits properly. Do this when you play sports, or ride a bike, scooter, or skateboard. Helmets help decrease your risk of a serious head injury. Talk to your healthcare provider about other ways you can protect yourself if you play sports.      Wear your seat belt every time you are in a car. This helps to decrease your risk for a head injury if you are in a car accident.     Follow up with your healthcare provider as directed: Write down your questions so you remember to ask them during your visits.        Contusion in Adults    WHAT YOU NEED TO KNOW:    A contusion is a bruise that appears on your skin after an injury. A bruise happens when small blood vessels tear but skin does not. When blood vessels tear, blood leaks into nearby tissue, such as soft tissue or muscle.    DISCHARGE INSTRUCTIONS:    Return to the emergency department if:     You have new trouble moving the injured area.      You have tingling or numbness in or near the injured area.      Your hand or foot below the bruise gets cold or turns pale.     Contact your healthcare provider if:     You find a new lump in the injured area.      Your symptoms do not improve with treatment after 4 to 5 days.      You have questions or concerns about your condition or care.    Medicines: You may need any of the following:     NSAIDs help decrease swelling and pain or fever. This medicine is available with or without a doctor's order. NSAIDs can cause stomach bleeding or kidney problems in certain people. If you take blood thinner medicine, always ask your healthcare provider if NSAIDs are safe for you. Always read the medicine label and follow directions.      Prescription pain medicine may be given. Do not wait until the pain is severe before you take your medicine.      Take your medicine as directed. Contact your healthcare provider if you think your medicine is not helping or if you have side effects. Tell him of her if you are allergic to any medicine. Keep a list of the medicines, vitamins, and herbs you take. Include the amounts, and when and why you take them. Bring the list or the pill bottles to follow-up visits. Carry your medicine list with you in case of an emergency.    Follow up with your healthcare provider as directed: You may need to return within a week to check your injury again. Write down your questions so you remember to ask them during your visits.    Help a contusion heal:     Rest the injured area or use it less than usual. If you bruised your leg or foot, you may need crutches or a cane to help you walk. This will help you keep weight off your injured body part.       Apply ice to decrease swelling and pain. Ice may also help prevent tissue damage. Use an ice pack, or put crushed ice in a plastic bag. Cover it with a towel and place it on your bruise for 15 to 20 minutes every hour or as directed.      Use compression to support the area and decrease swelling. Wrap an elastic bandage around the area over the bruised muscle. Make sure the bandage is not too tight. You should be able to fit 1 finger between the bandage and your skin.      Elevate (raise) your injured body part above the level of your heart to help decrease pain and swelling. Use pillows, blankets, or rolled towels to elevate the area as often as you can.      Do not drink alcohol as directed. Alcohol may slow healing.      Do not stretch injured muscles right after your injury. Ask your healthcare provider when and how you may safely stretch after your injury. Gentle stretches can help increase your flexibility.      Do not massage the area or put heating pads on the bruise right after your injury. Heat and massage may slow healing. Your healthcare provider may tell you to apply heat after several days. At that time, heat will start to help the injury heal.    Prevent another contusion:     Stretch and warm up before you play sports or exercise.      Wear protective gear when you play sports. Examples are shin guards and padding.       If you begin a new physical activity, start slowly to give your body a chance to adjust.    For the nasal bone fracture, apply ice gently to reduce swelling for 20 minutes intervals 4-5 times a day for the next 24 to 48 hours.

## 2023-07-11 NOTE — ED PROVIDER NOTE - CARE PLAN
1 Principal Discharge DX:	Nasal bone fracture  Secondary Diagnosis:	Fracture of zygomatic arch  Secondary Diagnosis:	Contusion of forearm, left

## 2023-07-11 NOTE — ED ADULT NURSE REASSESSMENT NOTE - NS ED NURSE REASSESS COMMENT FT1
Assumed patient care at 2am. Pt is ambulating to restroom. Pt verbalized pain to left hand and face, Tylenol administered. Pt ate sandwich and drank juice. Will continue to monitor.
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
## 2023-07-11 NOTE — ED PROVIDER NOTE - PATIENT PORTAL LINK FT
You can access the FollowMyHealth Patient Portal offered by [facility] by registering at the following website: [email or web address]. By joining [employer]’s FollowMyHealth portal, you will also be able to view your health information using other applications (apps) compatible with our system.

## 2023-07-11 NOTE — ED ADULT NURSE NOTE - NSFALLUNIVINTERV_ED_ALL_ED
Bed/Stretcher in lowest position, wheels locked, appropriate side rails in place/Call bell, personal items and telephone in reach/Instruct patient to call for assistance before getting out of bed/chair/stretcher/Non-slip footwear applied when patient is off stretcher/[place] to call system/Physically safe environment - no spills, clutter or unnecessary equipment/Purposeful proactive rounding/Room/bathroom lighting operational, light cord in reach
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
## 2023-07-11 NOTE — ED ADULT NURSE NOTE - SUICIDE SCREENING QUESTION 3
Patient has the following symptoms: Full body rash.  Facial swelling.   Previous Dr Ch patient and has not yet established with a new provider.    The symptoms have been present for 5 days.  The patient is not sure what to do next?    Please contact the patient at [phone] to discuss.    Pharmacy has been verified.      No

## 2023-07-11 NOTE — ED PROVIDER NOTE - ENMT, MLM
Airway patent, Nasal mucosa clear. Mouth with normal mucosa. Throat has no vesicles, no oropharyngeal exudates and uvula is midline.   Swelling of nasal presents with multiple small abrasions.  Extraocular motion is intact.  Pupils are equal round and reactive to light.

## 2023-07-14 DIAGNOSIS — B37.0 CANDIDAL STOMATITIS: ICD-10-CM

## 2023-07-14 DIAGNOSIS — J32.9 CHRONIC SINUSITIS, UNSPECIFIED: ICD-10-CM

## 2023-07-14 DIAGNOSIS — Z87.891 PERSONAL HISTORY OF NICOTINE DEPENDENCE: ICD-10-CM

## 2023-07-14 DIAGNOSIS — M54.50 LOW BACK PAIN, UNSPECIFIED: ICD-10-CM

## 2023-07-14 DIAGNOSIS — F14.19 COCAINE ABUSE WITH UNSPECIFIED COCAINE-INDUCED DISORDER: ICD-10-CM

## 2023-07-14 DIAGNOSIS — G11.4 HEREDITARY SPASTIC PARAPLEGIA: ICD-10-CM

## 2023-07-14 DIAGNOSIS — G89.29 OTHER CHRONIC PAIN: ICD-10-CM

## 2023-07-14 DIAGNOSIS — B20 HUMAN IMMUNODEFICIENCY VIRUS [HIV] DISEASE: ICD-10-CM

## 2023-07-14 DIAGNOSIS — M54.9 DORSALGIA, UNSPECIFIED: ICD-10-CM

## 2023-07-14 DIAGNOSIS — Z59.00 HOMELESSNESS UNSPECIFIED: ICD-10-CM

## 2023-07-14 DIAGNOSIS — J45.909 UNSPECIFIED ASTHMA, UNCOMPLICATED: ICD-10-CM

## 2023-07-14 DIAGNOSIS — G05.3 ENCEPHALITIS AND ENCEPHALOMYELITIS IN DISEASES CLASSIFIED ELSEWHERE: ICD-10-CM

## 2023-07-14 DIAGNOSIS — G61.0 GUILLAIN-BARRE SYNDROME: ICD-10-CM

## 2023-07-14 SDOH — ECONOMIC STABILITY - HOUSING INSECURITY: HOMELESSNESS UNSPECIFIED: Z59.00

## 2023-07-17 ENCOUNTER — EMERGENCY (EMERGENCY)
Facility: HOSPITAL | Age: 33
LOS: 1 days | Discharge: HOME | End: 2023-07-17
Attending: STUDENT IN AN ORGANIZED HEALTH CARE EDUCATION/TRAINING PROGRAM
Payer: COMMERCIAL

## 2023-07-17 VITALS
WEIGHT: 179.9 LBS | HEART RATE: 58 BPM | TEMPERATURE: 98 F | HEIGHT: 72 IN | OXYGEN SATURATION: 100 % | RESPIRATION RATE: 16 BRPM | DIASTOLIC BLOOD PRESSURE: 106 MMHG | SYSTOLIC BLOOD PRESSURE: 156 MMHG

## 2023-07-17 DIAGNOSIS — Z98.890 OTHER SPECIFIED POSTPROCEDURAL STATES: Chronic | ICD-10-CM

## 2023-07-17 PROCEDURE — 93010 ELECTROCARDIOGRAM REPORT: CPT

## 2023-07-17 PROCEDURE — 99285 EMERGENCY DEPT VISIT HI MDM: CPT

## 2023-07-17 PROCEDURE — 71046 X-RAY EXAM CHEST 2 VIEWS: CPT | Mod: 26

## 2023-07-17 RX ORDER — BICTEGRAVIR SODIUM, EMTRICITABINE, AND TENOFOVIR ALAFENAMIDE FUMARATE 30; 120; 15 MG/1; MG/1; MG/1
1 TABLET ORAL DAILY
Refills: 0 | Status: DISCONTINUED | OUTPATIENT
Start: 2023-07-17 | End: 2023-07-25

## 2023-07-17 RX ADMIN — BICTEGRAVIR SODIUM, EMTRICITABINE, AND TENOFOVIR ALAFENAMIDE FUMARATE 1 TABLET(S): 30; 120; 15 TABLET ORAL at 23:39

## 2023-07-17 NOTE — ED ADULT NURSE NOTE - OBJECTIVE STATEMENT
patient is a 34 y/o/m w/pmhx HIV, brought in by PD c/o cough, sob, cp x 3 days. pain is worse with movement, does not radiate. pt was seen at [facility] this morning and dc. Denies fevers, palpitations, nausea, vomiting, diarrhea

## 2023-07-17 NOTE — ED PROVIDER NOTE - PROGRESS NOTE DETAILS
stable on reassessment. Conversation had with patient regarding results of lab work and imaging. Patient agrees with plan for discharge at this time. Patient agrees to comply with follow up to primary care doctor. Return to ED precautions and discharge instructions discussed with patient with verbal understanding. -DO Maico

## 2023-07-17 NOTE — ED ADULT NURSE NOTE - CHIEF COMPLAINT QUOTE
Pt stated he had CP since last night. Seen at [facility] this morning. Pt denies palpitations, HA, dizziness, SOB. PMH afib. EKG in progress.

## 2023-07-17 NOTE — ED ADULT TRIAGE NOTE - CHIEF COMPLAINT QUOTE
Pt stated he had CP since last night. Seen at [facility] this morning. Pt denies palpitations, HA, dizziness, SOB. PMH afib. EKG in progress.

## 2023-07-17 NOTE — ED PROVIDER NOTE - PHYSICAL EXAMINATION
General: Well appearing in no acute distress, alert and cooperative  Head: Normocephalic, atraumatic  Eyes: PERRLA, no conjunctival injection, no scleral icterus, EOMI  ENMT: Atraumatic external nose and ears  Neck: Soft and supple, full ROM without pain  Cardiac: Regular rate and regular rhythm, no murmurs, peripheral pulses 2+ and symmetric in all extremities, no LE edema.  Resp: Unlabored respiratory effort, lungs CTAB  Abd: Soft, non-tender, non-distended  MSK: Spine midline and non-tender   Skin: Warm and dry  Neuro: AO x 3, moves all extremities symmetrically, Motor strength and sensation grossly intact

## 2023-07-17 NOTE — ED PROVIDER NOTE - PATIENT PORTAL LINK FT
You can access the FollowMyHealth Patient Portal offered by [facility] by registering at the following website: [email or web address]. By joining [employer]’s FollowMyHealth portal, you will also be able to view your health information using other applications (apps) compatible with our system.

## 2023-07-17 NOTE — ED CLERICAL - NS ED CARE COORDINATION INFORMATION
This patient is eligible for (or currently enrolled in) an outpatient care management program available through "[employer] This program can coordinate outpatient follow up and assist the patient in accessing a variety of outpatient resources.  If discharged from the ED, the patient will be contacted to see if any additional resources are needed.                                                                                    Please call the Nurse Clinical Call Center at [phone] with any questions or for assistance in discharge planning.

## 2023-07-17 NOTE — ED PROVIDER NOTE - NSFOLLOWUPINSTRUCTIONS_ED_ALL_ED_FT
Please follow-up with your primary care doctor.  Please call for an appointment in the next 48 hours but if you cannot follow-up with your primary care doctor please return to the Emergency Department for any urgent issues.    You were given a copy of the tests performed today.  Please bring the results with you and review them with your primary care doctor.    If you have any worsening of symptoms or any other concerns please return to the Emergency Department immediately.    Please continue taking your home medications as directed.    Cough    Coughing is a reflex that clears your throat and your airways. Coughing helps to heal and protect your lungs. It is normal to cough occasionally, but a cough that happens with other symptoms or lasts a long time may be a sign of a condition that needs treatment. Coughing may be caused by infections, asthma or COPD, smoking, postnasal drip, gastroesophageal reflux, as well as other medical conditions. Take medicines only as instructed by your health care provider. Avoid environments or triggers that causes you to cough at work or at home.    SEEK IMMEDIATE MEDICAL CARE IF YOU HAVE ANY OF THE FOLLOWING SYMPTOMS: coughing up blood, shortness of breath, rapid heart rate, chest pain, unexplained weight loss or night sweats. patient is medically cleared and stable to be discharged from the emergency department      take your antibiotics for bronchitis vs pneumonia    please follow up with your doctor to monitor your CD4 count.       Please follow-up with your primary care doctor.  Please call for an appointment in the next 48 hours but if you cannot follow-up with your primary care doctor please return to the Emergency Department for any urgent issues.    You were given a copy of the tests performed today.  Please bring the results with you and review them with your primary care doctor.    If you have any worsening of symptoms or any other concerns please return to the Emergency Department immediately.    Please continue taking your home medications as directed.    Pneumonia    Pneumonia is an infection of the lungs. Pneumonia may be caused by bacteria, viruses, or funguses. Symptoms include coughing, fever, chest pain when breathing deeply or coughing, shortness of breath, fatigue, or muscle aches. Pneumonia can be diagnosed with a medical history and physical exam, as well as other tests which may include a chest X-ray. If you were prescribed an antibiotic medicine, take it as told by your health care provider and do not stop taking the antibiotic even if you start to feel better. Do not use tobacco products, including cigarettes, chewing tobacco, and e-cigarettes.    SEEK IMMEDIATE MEDICAL CARE IF YOU HAVE ANY OF THE FOLLOWING SYMPTOMS: worsening shortness of breath, worsening chest pain, coughing up blood, change in mental status, lightheadedness/dizziness.

## 2023-07-17 NOTE — ED PROVIDER NOTE - CLINICAL SUMMARY MEDICAL DECISION MAKING FREE TEXT BOX
33y Male with multiple days of cough, shortness of breath, chest pain. Evaluated at another ED today for similar symptoms and discharged from ED. No fevers, palpitations, nausea, vomiting, focal weakness. No tachycardia, hemodynamically stable, lungs clear, neurovascularly intact. ECG non ischemic. 33y Male with multiple days of cough, shortness of breath, chest pain in the setting of HIV with medication non-compliance and last noted CD4 count 157. No fevers, palpitations, nausea, vomiting, focal weakness. No tachycardia, hemodynamically stable, lungs clear, neurovascularly intact. ECG non ischemic. Differential diagnosis includes but not limited to PNA, electrolyte derangement, viral illness. 33y Male with multiple days of cough, shortness of breath, chest pain in the setting of HIV with medication non-compliance and last noted CD4 count 157. No fevers, palpitations, nausea, vomiting, focal weakness. No tachycardia, hemodynamically stable, lungs clear, neurovascularly intact. ECG non ischemic. Differential diagnosis includes but not limited to PNA, electrolyte derangement, viral illness. Labs and imaging independently reviewed and interpreted, treating for bronchitis vs pna. Antibiotics started, discharge with pcp.

## 2023-07-17 NOTE — ED ADULT NURSE NOTE - NSFALLUNIVINTERV_ED_ALL_ED
Bed/Stretcher in lowest position, wheels locked, appropriate side rails in place/Call bell, personal items and telephone in reach/Instruct patient to call for assistance before getting out of bed/chair/stretcher/Non-slip footwear applied when patient is off stretcher/[place] to call system/Physically safe environment - no spills, clutter or unnecessary equipment/Purposeful proactive rounding/Room/bathroom lighting operational, light cord in reach

## 2023-07-17 NOTE — ED PROVIDER NOTE - OBJECTIVE STATEMENT
33y Male with history of HIV BIBPD presenting with cough, shortness of breath, chest pain x 3 days with no fevers. Patient reports worsening chest pain with movement but denies exertional symptoms. Reports being off of HIV medication intermittently the past week. Patient reports being evaluated at [facility] ED earlier today for the same symptoms with work up consisting of blood work, ecg, cxr. Patient does not have the results with him and unsure what the results were. Denies palpitations, nausea, vomiting, diarrhea, hematuria, dysuria, dark stools, focal neurologic symptoms. 33y Male with history of HIV BIBPD presenting with cough, shortness of breath, chest pain x 3 days with no fevers. Patient reports worsening chest pain with movement but denies exertional symptoms. Reports being off of HIV medication intermittently the past month. Patient reports being evaluated at [facility] ED earlier today for the same symptoms with work up consisting of blood work, ecg, cxr. Patient does not have the results with him and unsure what the results were. Denies palpitations, nausea, vomiting, diarrhea, hematuria, dysuria, dark stools, focal neurologic symptoms.

## 2023-07-18 VITALS
DIASTOLIC BLOOD PRESSURE: 80 MMHG | SYSTOLIC BLOOD PRESSURE: 132 MMHG | HEART RATE: 78 BPM | RESPIRATION RATE: 15 BRPM | OXYGEN SATURATION: 100 % | TEMPERATURE: 98 F

## 2023-07-18 LAB
ALBUMIN SERPL ELPH-MCNC: 3.8 G/DL — SIGNIFICANT CHANGE UP (ref 3.3–5.2)
ALP SERPL-CCNC: 65 U/L — SIGNIFICANT CHANGE UP (ref 40–120)
ALT FLD-CCNC: 15 U/L — SIGNIFICANT CHANGE UP
ANION GAP SERPL CALC-SCNC: 13 MMOL/L — SIGNIFICANT CHANGE UP (ref 5–17)
APPEARANCE UR: CLEAR — SIGNIFICANT CHANGE UP
APTT BLD: 32.1 SEC — SIGNIFICANT CHANGE UP (ref 27.5–35.5)
AST SERPL-CCNC: 22 U/L — SIGNIFICANT CHANGE UP
BASOPHILS # BLD AUTO: 0 K/UL — SIGNIFICANT CHANGE UP (ref 0–0.2)
BASOPHILS NFR BLD AUTO: 0 % — SIGNIFICANT CHANGE UP (ref 0–2)
BILIRUB SERPL-MCNC: 0.3 MG/DL — LOW (ref 0.4–2)
BILIRUB UR-MCNC: NEGATIVE — SIGNIFICANT CHANGE UP
BUN SERPL-MCNC: 10.6 MG/DL — SIGNIFICANT CHANGE UP (ref 8–20)
CALCIUM SERPL-MCNC: 8.5 MG/DL — SIGNIFICANT CHANGE UP (ref 8.4–10.5)
CHLORIDE SERPL-SCNC: 102 MMOL/L — SIGNIFICANT CHANGE UP (ref 96–108)
CO2 SERPL-SCNC: 22 MMOL/L — SIGNIFICANT CHANGE UP (ref 22–29)
COLOR SPEC: YELLOW — SIGNIFICANT CHANGE UP
CREAT SERPL-MCNC: 0.54 MG/DL — SIGNIFICANT CHANGE UP (ref 0.5–1.3)
DIFF PNL FLD: NEGATIVE — SIGNIFICANT CHANGE UP
EGFR: 134 ML/MIN/1.73M2 — SIGNIFICANT CHANGE UP
EOSINOPHIL # BLD AUTO: 0 K/UL — SIGNIFICANT CHANGE UP (ref 0–0.5)
EOSINOPHIL NFR BLD AUTO: 0 % — SIGNIFICANT CHANGE UP (ref 0–6)
GAS PNL BLDA: SIGNIFICANT CHANGE UP
GLUCOSE SERPL-MCNC: 96 MG/DL — SIGNIFICANT CHANGE UP (ref 70–99)
GLUCOSE UR QL: NEGATIVE MG/DL — SIGNIFICANT CHANGE UP
HCT VFR BLD CALC: 37.8 % — LOW (ref 39–50)
HGB BLD-MCNC: 12.4 G/DL — LOW (ref 13–17)
INR BLD: 1.25 RATIO — HIGH (ref 0.88–1.16)
KETONES UR-MCNC: NEGATIVE — SIGNIFICANT CHANGE UP
LDH SERPL L TO P-CCNC: 169 U/L — SIGNIFICANT CHANGE UP (ref 98–192)
LEUKOCYTE ESTERASE UR-ACNC: NEGATIVE — SIGNIFICANT CHANGE UP
LYMPHOCYTES # BLD AUTO: 1.93 K/UL — SIGNIFICANT CHANGE UP (ref 1–3.3)
LYMPHOCYTES # BLD AUTO: 69.4 % — HIGH (ref 13–44)
MANUAL SMEAR VERIFICATION: SIGNIFICANT CHANGE UP
MCHC RBC-ENTMCNC: 28.8 PG — SIGNIFICANT CHANGE UP (ref 27–34)
MCHC RBC-ENTMCNC: 32.8 GM/DL — SIGNIFICANT CHANGE UP (ref 32–36)
MCV RBC AUTO: 87.7 FL — SIGNIFICANT CHANGE UP (ref 80–100)
MONOCYTES # BLD AUTO: 0.27 K/UL — SIGNIFICANT CHANGE UP (ref 0–0.9)
MONOCYTES NFR BLD AUTO: 9.7 % — SIGNIFICANT CHANGE UP (ref 2–14)
NEUTROPHILS # BLD AUTO: 0.49 K/UL — LOW (ref 1.8–7.4)
NEUTROPHILS NFR BLD AUTO: 17.7 % — LOW (ref 43–77)
NITRITE UR-MCNC: NEGATIVE — SIGNIFICANT CHANGE UP
PH UR: 7 — SIGNIFICANT CHANGE UP (ref 5–8)
PLAT MORPH BLD: NORMAL — SIGNIFICANT CHANGE UP
PLATELET # BLD AUTO: 237 K/UL — SIGNIFICANT CHANGE UP (ref 150–400)
POTASSIUM SERPL-MCNC: 3.7 MMOL/L — SIGNIFICANT CHANGE UP (ref 3.5–5.3)
POTASSIUM SERPL-SCNC: 3.7 MMOL/L — SIGNIFICANT CHANGE UP (ref 3.5–5.3)
PROT SERPL-MCNC: 7 G/DL — SIGNIFICANT CHANGE UP (ref 6.6–8.7)
PROT UR-MCNC: NEGATIVE — SIGNIFICANT CHANGE UP
PROTHROM AB SERPL-ACNC: 14.5 SEC — HIGH (ref 10.5–13.4)
RAPID RVP RESULT: SIGNIFICANT CHANGE UP
RBC # BLD: 4.31 M/UL — SIGNIFICANT CHANGE UP (ref 4.2–5.8)
RBC # FLD: 13.1 % — SIGNIFICANT CHANGE UP (ref 10.3–14.5)
RBC BLD AUTO: NORMAL — SIGNIFICANT CHANGE UP
SARS-COV-2 RNA SPEC QL NAA+PROBE: SIGNIFICANT CHANGE UP
SMUDGE CELLS # BLD: PRESENT — SIGNIFICANT CHANGE UP
SODIUM SERPL-SCNC: 137 MMOL/L — SIGNIFICANT CHANGE UP (ref 135–145)
SP GR SPEC: 1.01 — SIGNIFICANT CHANGE UP (ref 1.01–1.02)
TROPONIN T SERPL-MCNC: <0.01 NG/ML — SIGNIFICANT CHANGE UP (ref 0–0.06)
UROBILINOGEN FLD QL: 4 MG/DL
VARIANT LYMPHS # BLD: 3.2 % — SIGNIFICANT CHANGE UP (ref 0–6)
WBC # BLD: 2.78 K/UL — LOW (ref 3.8–10.5)
WBC # FLD AUTO: 2.78 K/UL — LOW (ref 3.8–10.5)

## 2023-07-18 PROCEDURE — 82330 ASSAY OF CALCIUM: CPT

## 2023-07-18 PROCEDURE — 36415 COLL VENOUS BLD VENIPUNCTURE: CPT

## 2023-07-18 PROCEDURE — 71046 X-RAY EXAM CHEST 2 VIEWS: CPT

## 2023-07-18 PROCEDURE — 84484 ASSAY OF TROPONIN QUANT: CPT

## 2023-07-18 PROCEDURE — 81003 URINALYSIS AUTO W/O SCOPE: CPT

## 2023-07-18 PROCEDURE — 82947 ASSAY GLUCOSE BLOOD QUANT: CPT

## 2023-07-18 PROCEDURE — 85610 PROTHROMBIN TIME: CPT

## 2023-07-18 PROCEDURE — 82435 ASSAY OF BLOOD CHLORIDE: CPT

## 2023-07-18 PROCEDURE — 85018 HEMOGLOBIN: CPT

## 2023-07-18 PROCEDURE — 0225U NFCT DS DNA&RNA 21 SARSCOV2: CPT

## 2023-07-18 PROCEDURE — 85730 THROMBOPLASTIN TIME PARTIAL: CPT

## 2023-07-18 PROCEDURE — 82803 BLOOD GASES ANY COMBINATION: CPT

## 2023-07-18 PROCEDURE — 93005 ELECTROCARDIOGRAM TRACING: CPT

## 2023-07-18 PROCEDURE — 84132 ASSAY OF SERUM POTASSIUM: CPT

## 2023-07-18 PROCEDURE — 85025 COMPLETE CBC W/AUTO DIFF WBC: CPT

## 2023-07-18 PROCEDURE — 84295 ASSAY OF SERUM SODIUM: CPT

## 2023-07-18 PROCEDURE — 71260 CT THORAX DX C+: CPT | Mod: 26,MA

## 2023-07-18 PROCEDURE — 99285 EMERGENCY DEPT VISIT HI MDM: CPT | Mod: 25

## 2023-07-18 PROCEDURE — 87040 BLOOD CULTURE FOR BACTERIA: CPT

## 2023-07-18 PROCEDURE — 85014 HEMATOCRIT: CPT

## 2023-07-18 PROCEDURE — 83605 ASSAY OF LACTIC ACID: CPT

## 2023-07-18 PROCEDURE — 83615 LACTATE (LD) (LDH) ENZYME: CPT

## 2023-07-18 PROCEDURE — 71260 CT THORAX DX C+: CPT | Mod: MA

## 2023-07-18 PROCEDURE — 80053 COMPREHEN METABOLIC PANEL: CPT

## 2023-07-18 RX ORDER — ACETAMINOPHEN 500 MG
650 TABLET ORAL ONCE
Refills: 0 | Status: COMPLETED | OUTPATIENT
Start: 2023-07-18 | End: 2023-07-18

## 2023-07-18 RX ADMIN — Medication 650 MILLIGRAM(S): at 04:21

## 2023-07-18 RX ADMIN — Medication 100 MILLIGRAM(S): at 04:21

## 2023-07-25 ENCOUNTER — EMERGENCY (EMERGENCY)
Facility: HOSPITAL | Age: 33
LOS: 1 days | Discharge: HOME | End: 2023-07-25
Attending: EMERGENCY MEDICINE
Payer: COMMERCIAL

## 2023-07-25 VITALS
SYSTOLIC BLOOD PRESSURE: 144 MMHG | TEMPERATURE: 98 F | OXYGEN SATURATION: 97 % | DIASTOLIC BLOOD PRESSURE: 90 MMHG | HEART RATE: 96 BPM | RESPIRATION RATE: 18 BRPM | WEIGHT: 179.9 LBS | HEIGHT: 72 IN

## 2023-07-25 DIAGNOSIS — Z98.890 OTHER SPECIFIED POSTPROCEDURAL STATES: Chronic | ICD-10-CM

## 2023-07-25 LAB
ALBUMIN SERPL ELPH-MCNC: 3.9 G/DL — SIGNIFICANT CHANGE UP (ref 3.3–5.2)
ALP SERPL-CCNC: 64 U/L — SIGNIFICANT CHANGE UP (ref 40–120)
ALT FLD-CCNC: 19 U/L — SIGNIFICANT CHANGE UP
ANION GAP SERPL CALC-SCNC: 16 MMOL/L — SIGNIFICANT CHANGE UP (ref 5–17)
AST SERPL-CCNC: 21 U/L — SIGNIFICANT CHANGE UP
BASOPHILS # BLD AUTO: 0.01 K/UL — SIGNIFICANT CHANGE UP (ref 0–0.2)
BASOPHILS NFR BLD AUTO: 0.1 % — SIGNIFICANT CHANGE UP (ref 0–2)
BILIRUB SERPL-MCNC: 0.4 MG/DL — SIGNIFICANT CHANGE UP (ref 0.4–2)
BUN SERPL-MCNC: 9.9 MG/DL — SIGNIFICANT CHANGE UP (ref 8–20)
CALCIUM SERPL-MCNC: 9.2 MG/DL — SIGNIFICANT CHANGE UP (ref 8.4–10.5)
CHLORIDE SERPL-SCNC: 100 MMOL/L — SIGNIFICANT CHANGE UP (ref 96–108)
CO2 SERPL-SCNC: 22 MMOL/L — SIGNIFICANT CHANGE UP (ref 22–29)
CREAT SERPL-MCNC: 0.78 MG/DL — SIGNIFICANT CHANGE UP (ref 0.5–1.3)
EGFR: 120 ML/MIN/1.73M2 — SIGNIFICANT CHANGE UP
EOSINOPHIL # BLD AUTO: 0.09 K/UL — SIGNIFICANT CHANGE UP (ref 0–0.5)
EOSINOPHIL NFR BLD AUTO: 1.3 % — SIGNIFICANT CHANGE UP (ref 0–6)
GLUCOSE SERPL-MCNC: 126 MG/DL — HIGH (ref 70–99)
HCT VFR BLD CALC: 37.1 % — LOW (ref 39–50)
HGB BLD-MCNC: 12.4 G/DL — LOW (ref 13–17)
IMM GRANULOCYTES NFR BLD AUTO: 0.3 % — SIGNIFICANT CHANGE UP (ref 0–0.9)
LYMPHOCYTES # BLD AUTO: 2.62 K/UL — SIGNIFICANT CHANGE UP (ref 1–3.3)
LYMPHOCYTES # BLD AUTO: 37.8 % — SIGNIFICANT CHANGE UP (ref 13–44)
MCHC RBC-ENTMCNC: 30 PG — SIGNIFICANT CHANGE UP (ref 27–34)
MCHC RBC-ENTMCNC: 33.4 GM/DL — SIGNIFICANT CHANGE UP (ref 32–36)
MCV RBC AUTO: 89.6 FL — SIGNIFICANT CHANGE UP (ref 80–100)
MONOCYTES # BLD AUTO: 0.56 K/UL — SIGNIFICANT CHANGE UP (ref 0–0.9)
MONOCYTES NFR BLD AUTO: 8.1 % — SIGNIFICANT CHANGE UP (ref 2–14)
NEUTROPHILS # BLD AUTO: 3.63 K/UL — SIGNIFICANT CHANGE UP (ref 1.8–7.4)
NEUTROPHILS NFR BLD AUTO: 52.4 % — SIGNIFICANT CHANGE UP (ref 43–77)
PLATELET # BLD AUTO: 239 K/UL — SIGNIFICANT CHANGE UP (ref 150–400)
POTASSIUM SERPL-MCNC: 3.5 MMOL/L — SIGNIFICANT CHANGE UP (ref 3.5–5.3)
POTASSIUM SERPL-SCNC: 3.5 MMOL/L — SIGNIFICANT CHANGE UP (ref 3.5–5.3)
PROT SERPL-MCNC: 7 G/DL — SIGNIFICANT CHANGE UP (ref 6.6–8.7)
RBC # BLD: 4.14 M/UL — LOW (ref 4.2–5.8)
RBC # FLD: 13.1 % — SIGNIFICANT CHANGE UP (ref 10.3–14.5)
SODIUM SERPL-SCNC: 137 MMOL/L — SIGNIFICANT CHANGE UP (ref 135–145)
TROPONIN T SERPL-MCNC: <0.01 NG/ML — SIGNIFICANT CHANGE UP (ref 0–0.06)
WBC # BLD: 6.93 K/UL — SIGNIFICANT CHANGE UP (ref 3.8–10.5)
WBC # FLD AUTO: 6.93 K/UL — SIGNIFICANT CHANGE UP (ref 3.8–10.5)

## 2023-07-25 PROCEDURE — 36415 COLL VENOUS BLD VENIPUNCTURE: CPT

## 2023-07-25 PROCEDURE — 71045 X-RAY EXAM CHEST 1 VIEW: CPT

## 2023-07-25 PROCEDURE — 85025 COMPLETE CBC W/AUTO DIFF WBC: CPT

## 2023-07-25 PROCEDURE — 80053 COMPREHEN METABOLIC PANEL: CPT

## 2023-07-25 PROCEDURE — 99284 EMERGENCY DEPT VISIT MOD MDM: CPT | Mod: 25

## 2023-07-25 PROCEDURE — 93010 ELECTROCARDIOGRAM REPORT: CPT | Mod: 76

## 2023-07-25 PROCEDURE — 93005 ELECTROCARDIOGRAM TRACING: CPT

## 2023-07-25 PROCEDURE — 71045 X-RAY EXAM CHEST 1 VIEW: CPT | Mod: 26

## 2023-07-25 PROCEDURE — 99284 EMERGENCY DEPT VISIT MOD MDM: CPT

## 2023-07-25 PROCEDURE — 84484 ASSAY OF TROPONIN QUANT: CPT

## 2023-07-25 RX ORDER — IBUPROFEN 200 MG
600 TABLET ORAL ONCE
Refills: 0 | Status: COMPLETED | OUTPATIENT
Start: 2023-07-25 | End: 2023-07-25

## 2023-07-25 RX ORDER — ACETAMINOPHEN 500 MG
650 TABLET ORAL ONCE
Refills: 0 | Status: COMPLETED | OUTPATIENT
Start: 2023-07-25 | End: 2023-07-25

## 2023-07-25 RX ORDER — MORPHINE SULFATE 50 MG/1
4 CAPSULE, EXTENDED RELEASE ORAL ONCE
Refills: 0 | Status: DISCONTINUED | OUTPATIENT
Start: 2023-07-25 | End: 2023-07-25

## 2023-07-25 RX ADMIN — Medication 650 MILLIGRAM(S): at 02:56

## 2023-07-25 NOTE — ED PROVIDER NOTE - ATTENDING CONTRIBUTION TO CARE
Kandy: I performed a face to face bedside interview with patient regarding history of present illness, review of symptoms and past medical history. I completed an independent physical exam.  I have discussed patient's plan of care with resident.   I agree with note as stated above including HISTORY OF PRESENT ILLNESS, HIV, PAST MEDICAL/SURGICAL/FAMILY/SOCIAL HISTORY, ALLERGIES AND HOME MEDICATIONS, REVIEW OF SYSTEMS, PHYSICAL EXAM, MEDICAL DECISION MAKING and any PROGRESS NOTES during the time I functioned as the attending physician for this patient unless otherwise noted. My brief assessment is as follows: Pt w/ pmhx of hiv presenting w/ chest pain. Exam suggests MSK etiology. Labs, EKG, CXR ordered. ECG WNL. Benign exam.

## 2023-07-25 NOTE — ED PROVIDER NOTE - PHYSICAL EXAMINATION
Gen: Well appearing in NAD  Head: NC/AT  Neck: trachea midline  Card: regular rate and rhythm  Resp:  CTAB, L anterior chest wall w/ mild point tenderness  Abd: soft, non-distended, non-tender  Ext: no deformities above reported baseline  Neuro:  A&O, no motor or sensory deficits above reported baseline  Skin:  Warm and dry as visualized  Psych:  Normal affect and mood

## 2023-07-25 NOTE — ED ADULT NURSE NOTE - TEMPLATE LIST FOR HEAD TO TOE ASSESSMENT
Your Child's Health  11-14 Year Old Visit      Vadimbhargav Rosario  July 7, 2022    There were no vitals taken for this visit.  Weight:       YOUR CHILD'S 11 to 14 YEAR-OLD VISIT    Personal Safety  Violence in the community and schools can impact a child's physical and emotional health. Talk to your child about bullying and emphasize that it should always be reported. Work with schools and administrators to address bullying and intimidation in your child's school. Your child should know they can always call you when they are uncomfortable or concerned that they are getting into a dangerous situation. In dating situations, teens need to know it is always okay to say \"no\" to something they are uncomfortable with and that \"no\" means NO and must be respected. Sadly, youth in this age range are most likely to be targeted for exploitation (human trafficking, prostitution). Tell your child that they should never form a relationship with someone who forbids them to tell their parents about it - these are always dangerous situations.    Smoking and Other Drugs  It's always important to tell your child that you do not want them to smoke or use drugs. When there are family members, friends, or peers who are smoking, discuss with your child how strong the addiction is and how difficult it is to quit. Studies show that youth who are exposed to e-cigarettes are more likely to try them themselves, and using e-cigarettes is a risk factor for smoking regular cigarettes.    Self-Esteem & Emotional Health  A strong sense of self-esteem contributes to good mental health and can help your child succeed in many aspects of life. As your child is becoming more independent, it is still important to spend time together as a family, start discussions about lots of topics (not just about things you disagree on), listen respectfully to your child and answer questions honestly. Make sure you are clear about family rules and expectations regarding  their dress, activities, media use, etc.    Success in school also builds self-esteem. Children who do well in school are less likely to be involved in risky behaviors. As school becomes more challenging academically, increasing demands will cause some stress that your child needs to learn to handle in a healthy way. Also, subtle learning or attention problems which may have been overlooked previously may become evident at higher grade levels. Poor school performance could also be a sign of anxiety or depression. Frequent absenteeism is a risk factor for dropping out of school; talk to your doctors or teachers if your child is missing a lot of school.    Involvement with activities that really interest your child is another way to build their self-esteem. Even if they are having some struggles with schoolwork, try to find an opportunity for them to pursue something that really interests them, like music, art, drama or sports.    Preteens and young teens can often be faustin and withdrawn, and this is often normal as long as it does not last long or significantly impact their schoolwork, activities, or relationships. Depression can impact adolescents with typical symptoms being irritability, persistent boredom, poor school performance and withdrawal from activities and relationships. Young teens identifying as lesbian, ariza, bisexual, transgender, or questioning are particularly at risk for emotional health problems; family acceptance of these young people is one of the strongest factors leading to positive outcomes for them.    Sexuality  While most youth in this age range have not had sexual experiences, your child needs to know that you do not want them to engage in sexual activity and that they can come to you with any questions about sex. Not having sex is the safest way to prevent pregnancy and avoid sexually transmitted infections. Experimenting with drugs or alcohol will increase your child's risk of making poor  decisions, which is another reason to continue to remind them that you do not want them to use drugs or alcohol. Studies show that parents have more influence on teen sexual values and decision-making than peers, media, siblings, teachers, and [employer] teachings. But you can't ignore the topic --talk about it! Use what is going on at school, what they are seeing on TV and what is happening with friends to talk about relationships and sex. Discuss how they can respond to pressures to use drugs or engage in sexual activity. If you aren't sure how to talk to your child about sex, find resources at the American Academy of Pediatrics healthychildren.org website.     Dental Health  It is important to care for permanent teeth by brushing at least twice a day with a fluoridated toothpaste,  flossing daily and seeing a dentist regularly. Limiting carbonated sodas and other sweet beverages is also important to prevent tooth decay. Gum chewers should choose sugarless gum. Youth who are active in contact sports should wear a mouth guard. Let us know if your water supply comes from a well; fluoride supplements may be indicated.    Body Image and Healthy Lifestyle  Youth at this age can be very sensitive to how they look compared to others. The media, unfortunately, frequently presents unhealthy, unrealistic body images to youth. It's not news that a healthy body size is obtained by eating healthy foods and being physically active every day and limiting unhealthy habits like junk food and too much time spent just sitting. Encourage healthy habits in your child and remember to provide praise about all of their good aspects, not just how they look.    Make your child a partner in healthy lifestyle choices by having them help with meal planning, shopping, and food preparation at this age. Do your best to eat meals as a family as often as you can, despite busy schedules. (And remember to keep the TV off and phones away from the  table - this is one of the best times for a good family face-to-face communication.) Keep healthy choices in the home for in between meals snacks and do NOT keep a lot of junk food and unhealthy snacks in your home - if they are there, your children will eat them! Encourage lots of water to drink and avoid keeping soda and other sweet beverages in your home on a regular basis. Calcium and vitamin D remain very important for optimal bone health at this age. Your child needs 3 to 4 servings of a good source of calcium daily (low-fat or skim milk, yogurt, cheese, calcium-fortified orange juice or other foods). 600 IU of vitamin D daily is recommended. Most people cannot meet their vitamin D needs with their usual diet, so a multivitamin with iron supplement is a good way to get it. (A pure vitamin D supplement with 400 or 600 IU is also okay.)    Finding time to be physically active every day is a challenge with increasing school work and other activities. Encourage at least 60 minutes of physical activity each day. It does not have to be all at the same time. Physical education classes can count for some of it, but other activities, such as biking, dancing, and simply playing with the kids in the neighborhood are usually needed to get to the goal of 60 minutes a day. When participating in sports, make sure appropriate safety equipment is used (helmet, mouth  guard, eye protection, wrist guards, elbow and knee pads, athletic supporter). Limiting how much screen time or media use your child has is often necessary to make sure they have time for the recommended physical activity and exercise.    Sleep   Adequate sleep is important to all aspects of your child's health. Using digital devices before bedtime can interfere with quality sleep. Keeping electronic devices out of your child's room at night is a great step towards ensuring adequate sleep and rest. If you don't already have set rules about electronic media use,  check out the American Academy of Pediatrics healthychildren.org website (search for “media use plan”).    Safety On the Land and In the Water   Seatbelts do not fit properly until a child is taller than 4'9\" and weighs more than 80 pounds. Smaller kids in this age group won’t like riding in a booster seat but that is where they are safest. High-backed booster seats should be used if there are low seat backs or no head rests in your car; backless boosters can be used if your car has high seat backs and head rests. Children are safest in the back seat until they are 13 years old. Talk to your child about never getting into a car with someone who is under the influence of drugs or alcohol; let them know that they can always call you for help if this situation ever occurs.     Helmets and other protective gear should always be worn when biking, skating or skateboarding; they may be recommended for additional sports (kayaking, water polo, etc) for older children.  All-terrain vehicles (ATVs) are very dangerous and adolescents under 16 years of age should not be allowed to ride them. When on a boat or engaging in water sports, a [employer] approved lifejacket should always be worn.    Sun and Gun safety  Most teenagers love the sun, but the ultraviolet radiation of the sun causes skin damage (premature aging) and increases the risk of skin cancer. The same sun protection recommendations apply to all ages: do not spend time the sun without using sunscreen with SPF of 15 or higher, avoid prolonged exposure between 11 and 3 PM when the sun intensity is the highest, and wear sunglasses and other sun protective clothing. Use of tanning beds should not be permitted for adolescents.    If you have an adolescent with a history of depression, suicide attempts, or aggressive behaviors, it is very dangerous to keep a firearm in your home. If firearms are stored in your home, be sure they are stored unloaded and locked with  ammunition locked separately and be sure that children do not have access to the keys.    MEDICATION FOR FEVER OR PAIN:   Acetaminophen liquid (e.g., Tylenol or Tempra) may be given every four hours as needed for pain or fever. Acetaminophen liquid is less concentrated than the infant dropper bottle type. Be sure to check which product CONCENTRATION you are using.    CHILDREN’S Tylenol/Acetaminophen  (160 MG/5 mL)    Child’s Weight:  Dose:  36 - 47 pounds:    240 mg (7.5 mL (1 1/2 Teaspoons))  48 - 59 pounds:    320 mg (10.0 mL (2 Teaspoons))  60 - 71 pounds:    400 mg (12.5 mL (2 1/2 Teaspoons))  72 - 95 pounds:    480 mg (15.0 mL (3 Teaspoons))  Greater than 96 pounds:   640 mg (20.0 mL (4 Teaspoons))    CHILDREN’S Tylenol/Acetaminophen MELTAWAYS ( 80 MG tablets)    Child’s Weight:  Dose:  36 - 47 pounds:    240 mg (3 meltaway tablets)  48 - 59 pounds:    320 mg (4 meltaway tablets)  60 - 71 pounds:    400 mg (5 meltaway tablets)  72 - 95 pounds:    480 mg (6 meltaway tablets)  Greater than 96 pounds:   640 mg (8 meltaway tablets)    Greg (Jr) Tylenol/Acetaminophen MELTAWAYS (160 MG tablets)    Child’s Weight:  Dose:  36 - 47 pounds:    240 mg (1 1/2 meltaway tablets)  48 - 59 pounds:    320 mg (2 meltaway tablets)  60 - 71 pounds:    400 mg (2 1/2 meltaway tablets)  72 - 95 pounds:    480 mg (3 meltaway tablets)  Greater than 96 pounds:   640 mg (4 meltaway tablets)    CHILDREN'S Ibuprofen (e.g., Advil or Motrin) may be given every six hours as needed for pain or fever.    48 - 59 pounds:    200 mg (2 Teaspoons)  60 - 71 pounds:    250 mg (2 1/2 Teaspoons)  72 - 95 pounds:    300 mg (3 Teaspoons)        NEXT VISIT:  IN 1 YEAR    Thank you for entrusting your care to [facility].    Also, check out “Children’s Health” on the [employer] Blog for updates on timely topics regarding children’s health!       Cardiac

## 2023-07-25 NOTE — ED PROVIDER NOTE - NSFOLLOWUPINSTRUCTIONS_ED_ALL_ED_FT
- Follow up with your doctor within 2-3 days.   - Bring results with you to the appointment.   - Return to the ED for any new or worsening symptoms.     Chest Pain    Chest pain can be caused by many different conditions which may or may not be dangerous. Causes include heartburn, lung infections, heart attack, blood clot in lungs, skin infections, strain or damage to muscle, cartilage, or bones, etc. In addition to a history and physical examination, an electrocardiogram (ECG) or other lab tests may have been performed to determine the cause of your chest pain. Follow up with your primary care provider or with a cardiologist as instructed.     SEEK IMMEDIATE MEDICAL CARE IF YOU HAVE ANY OF THE FOLLOWING SYMPTOMS: worsening chest pain, coughing up blood, unexplained back/neck/jaw pain, severe abdominal pain, dizziness or lightheadedness, fainting, shortness of breath, sweaty or clammy skin, vomiting, or racing heart beat. These symptoms may represent a serious problem that is an emergency. Do not wait to see if the symptoms will go away. Get medical help right away. Call 911 and do not drive yourself to the hospital.

## 2023-07-25 NOTE — ED ADULT TRIAGE NOTE - HOW PATIENT ADDRESSED, PROFILE
Danette 17 FF     Patient:  [address] [place] [address] PCP:  [address] [place] [address] pediatrics   MRN:  [id number] Hospital Provider:  Jaleel Olguin Physician   Infant Name after D/C:  Mary Riggins Date of Note:  2021     YOB: 2022  12:46 AM  Birth Wt: Birth Weight: 8 lb 0.8 oz (3.65 kg) Most Recent Wt:  Weight - Scale: 8 lb 0.8 oz (3.65 kg)(Filed from Delivery Summary) Percent loss since birth weight:  0%    Information for the patient's mother:  Alexander Flor [[id number]]   37w4d       Birth Length:  Length: 21.26\" (47 cm)(Filed from Delivery Summary)  Birth Head Circumference:  Birth Head Circumference: 36.5 cm (14.37\")    Last Serum Bilirubin: No results found for: BILITOT  Last Transcutaneous Bilirubin:             [place] Screening and Immunization:   Hearing Screen:                                                   Metabolic Screen:        Congenital Heart Screen 1:     Congenital Heart Screen 2:  NA     Congenital Heart Screen 3: NA     Immunizations: There is no immunization history for the selected administration types on file for this patient. Maternal Data:    Information for the patient's mother:  Alexander Flor [[id number]]   00 y.o. Information for the patient's mother:  Alexander Flor [[id number]]   37w4d       /Para:   Information for the patient's mother:  Alexander Flor [[id number]]   [id number]        Prenatal History & Labs:   Information for the patient's mother:  Alexander Flor [[id number]]     Lab Results   Component Value Date    [address] A POS 2021    ABOEXTERN A 2020    RHEXTERN positive 2020    LABANTI NEG 2021    HBSAGI Non-reactive 2020    HEPBEXTERN non reactive 2020    RUBELABIGG 167.9 2020    RUBEXTERN immune 2020    RPREXTERN nonreactive 2020      HIV:   Information for the patient's mother:  Alexander Flor [[id number]]     Lab Results   Component Value Date    HIVEXTERN nonreactive 2020    HIV1X2 Non-reactive 08/23/2017    HIVAG/AB Non-Reactive 07/21/2020    HIVAG/AB Non-Reactive 02/21/2019      COVID-19:   Information for the patient's mother:  Domingo Sosa [[id number]]     Lab Results   Component Value Date    COVID19 Not Detected 2021      Admission RPR:   Information for the patient's mother:  Domingo Sosa [[id number]]     Lab Results   Component Value Date    RPREXTERN nonreactive 07/21/2020    LABRPR Non-reactive 04/18/2018    LABRPR Non-reactive 08/23/2017    [address] [place] Non-Reactive 2021       Hepatitis C:   Information for the patient's mother:  Domingo Sosa [[id number]]     Lab Results   Component Value Date    HCVABI Non-reactive 07/21/2020      GBS status:    Information for the patient's mother:  Domingo Sosa [[id number]]     Lab Results   Component Value Date    GBSCX No Group B Beta Strep isolated 09/11/2019             GBS treatment:  [facility] at admission PCN X 3  GC and Chlamydia:   Information for the patient's mother:  Domingo Sosa [[id number]]     Lab Results   Component Value Date    GONEXTERN negative 07/21/2020    CTRACHEXT negative 07/21/2020      Maternal Toxicology:     Information for the patient's mother:  Domingo Sosa [[id number]]     Lab Results   Component Value Date    LABAMPH Neg 2021    [address] Neg 09/21/2019    [address] Neg 09/18/2019    BARBSCNU Neg 2021    BARBSCNU Neg 09/21/2019    BARBSCNU Neg 09/18/2019    LABBENZ Neg 2021    LABBENZ Neg 09/21/2019    LABBENZ Neg 09/18/2019    CANSU Neg 2021    CANSU Neg 09/21/2019    CANSU Neg 09/18/2019    BUPRENUR Neg 2021    BUPRENUR Neg 09/21/2019    BUPRENUR Neg 09/18/2019    COCAIMETSCRU Neg 2021    COCAIMETSCRU Neg 09/21/2019    COCAIMETSCRU Neg 09/18/2019    OPIATESCREENURINE Neg 2021    OPIATESCREENURINE Neg 09/21/2019    OPIATESCREENURINE Neg 09/18/2019    PHENCYCLIDINESCREENURINE Neg 2021    PHENCYCLIDINESCREENURINE Neg 09/21/2019 PHENCYCLIDINESCREENURINE Neg 2019    LABMETH Neg 2021    PROPOX Neg 2021    PROPOX Neg 2019    PROPOX Neg 2019      Information for the patient's mother:  Ogy Liner [[id number]]     Lab Results   Component Value Date    OXYCODONEUR Neg 2021    OXYCODONEUR Neg 2019    OXYCODONEUR Neg 2019      Information for the patient's mother:  Ogy Liner [[id number]]     Past Medical History:   Diagnosis Date    Diabetes mellitus ([address] 2019    on insulin with previous pregnancy    Headache 2019    treated with medication    Hypertension     GHTN with previous pregnancy    Mental disorder     plan on medication after delivery    UTI (urinary tract infection)     during 1st pregnancy    Warts     on left knee---had right knee warts removed, below knee left leg      Other significant maternal history:  None. Maternal ultrasounds:  Normal per mother.  Information:  Information for the patient's mother:  Ogy Liner [[id number]]   Rupture Date: 21 (21)  Rupture Time:  (21)  Membrane Status: AROM (21)  Rupture Time:  (21)  Amniotic Fluid Color: Clear (21)    : 2021  12:46 AM   (ROM x 4h)       Delivery Method: Vaginal, Spontaneous  Rupture date:  2021  Rupture time:  6:52 PM    Additional  Information:  Complications:  None   Information for the patient's mother:  Ogy Liner [[id number]]         Reason for  section (if applicable):    Apgars:   APGAR One: 8;  APGAR Five: 9;  APGAR Ten: N/A  Resuscitation: Bulb Suction [20]; Stimulation [25]    Objective:   Reviewed pregnancy & family history as well as nursing notes & vitals.     Physical Exam:    Pulse 144   Temp 97.8 °F (36.6 °C)   Resp 39   Ht 21.26\" (54 cm) Comment: Filed from Delivery Summary  Wt 8 lb 0.8 oz (3.65 kg) Comment: Filed from Delivery Summary  HC 36.5 cm (14.37\") Comment: Filed from Delivery Summary  BMI 12.52 kg/m²     Constitutional: VSS. Alert and appropriate to exam.   No distress. Head: Fontanelles are open, soft and flat. No facial anomaly noted. No significant molding present. Ears:  External ears normal.   Nose: Nostrils without airway obstruction. Nose appears visually straight   Mouth/Throat:  Mucous membranes are moist. No cleft palate palpated. Eyes: Red reflex is present bilaterally on admission exam.   Cardiovascular: Normal rate, regular rhythm, S1 & S2 normal.  Distal  pulses are palpable. No murmur noted. Pulmonary/Chest: Effort normal.  Breath sounds equal and normal. No respiratory distress - no nasal flaring, stridor, grunting or retraction. No chest deformity noted. Abdominal: Soft. Bowel sounds are normal. No tenderness. No distension, mass or organomegaly. Umbilicus appears grossly normal     Genitourinary: Normal male external genitalia. Musculoskeletal: Normal ROM. Neg- [address]. Clavicles & spine intact. Neurological: . Tone normal for gestation. Suck & root normal. Symmetric and full [place]. Symmetric grasp & movement. Skin:  Skin is warm & dry. Capillary refill less than 3 seconds. No cyanosis or pallor. No visible jaundice. Recent Labs:   Recent Results (from the past 120 hour(s))   POCT Glucose    Collection Time: 21  3:09 AM   Result Value Ref Range    POC Glucose 65 47 - 110 mg/dl    Performed on ACCU-CHEK    POCT Glucose    Collection Time: 21  4:38 AM   Result Value Ref Range    POC Glucose 67 47 - 110 mg/dl    Performed on ACCU-CHEK    POCT Glucose    Collection Time: 21  8:27 AM   Result Value Ref Range    POC Glucose 56 47 - 110 mg/dl    Performed on ACCU-CHEK      [place] Medications   Vitamin K and Erythromycin Opthalmic Ointment given at delivery.     Assessment:     Patient Active Problem List   Diagnosis Code    Single liveborn infant delivered vaginally Z38.00    42 weeks gestation of pregnancy Z3A.37  Term birth of male  Z45.0    [place] GBS  at admission PCN X 3    Feeding Method: Feeding Method Used: Breastfeeding  Urine output:   established   Stool output:  not established  Percent weight change from birth:  0%    Maternal labs pending: none  Plan:   NCA book given and reviewed. Questions answered. Routine  care.     Henna Phillips Laurence

## 2023-07-25 NOTE — ED ADULT NURSE NOTE - NSFALLUNIVINTERV_ED_ALL_ED
Bed/Stretcher in lowest position, wheels locked, appropriate side rails in place/Call bell, personal items and telephone in reach/Instruct patient to call for assistance before getting out of bed/chair/stretcher/Non-slip footwear applied when patient is off stretcher/[place] to call system/Physically safe environment - no spills, clutter or unnecessary equipment/Purposeful proactive rounding/Room/bathroom lighting operational, light cord in reach

## 2023-07-25 NOTE — ED PROVIDER NOTE - PATIENT PORTAL LINK FT
You can access the FollowMyHealth Patient Portal offered by [facility] by registering at the following website: [email or web address]. By joining [employer]’s FollowMyHealth portal, you will also be able to view your health information using other applications (apps) compatible with our system.

## 2023-07-25 NOTE — ED ADULT NURSE NOTE - OBJECTIVE STATEMENT
Assumed pt care 0245. A&Ox4. C/O midsternal chest and posterior neck pain. PT states CP started today [place] the train. Pt denies any SOB, HA, dizziness, vision changes, abd pain, NVD, fever/chills, cough. PMH of HIV. Respirations even & unlabored. NAD. Pt made aware of plan of care and verbalized understanding.

## 2023-07-25 NOTE — ED ADULT TRIAGE NOTE - CHIEF COMPLAINT QUOTE
cp x weeks. seen here last week for same, gave abx for possible pneumonia- finished dose. denies sob, nausea. +weakness

## 2023-07-25 NOTE — ED PROVIDER NOTE - OBJECTIVE STATEMENT
32 y/o M pt w/ PMHx of HIV presents to ED c/o chest pain x3 hours. Onset on the train. Midsternal. He denies SOB, back pain, numbness, tingling, focal weakness, cough, fever, chills, abd pain, N/V/D, or any other complaints.

## 2023-07-25 NOTE — ED PROVIDER NOTE - CLINICAL SUMMARY MEDICAL DECISION MAKING FREE TEXT BOX
Pt w/ pmhx of hiv presenting w/ chest pain. Exam suggests MSK etiology. Labs, EKG, CXR ordered Pt w/ pmhx of hiv presenting w/ chest pain. Exam suggests MSK etiology. Labs, EKG, CXR ordered. ECG WNL. Benign exam. Pt w/ pmhx of hiv presenting w/ chest pain. Exam suggests MSK etiology. Labs, EKG, CXR ordered. ECG WNL. Benign exam. Workup is unremarkable for any emergent process.   Patient is now feeling improved and wishes to leave.  Patient / family members have capacity.    Patient/family was given full return precautions, counseled on red flag symptoms such as LOC, fever, severe pain, or focal deficits and advised to return to the ED for these reasons or any reason that was concerning to them. Patient/family was informed of all significant and incidental findings found on this workup today and all results were reviewed.   All questions were answered, advised to make close follow up with their primary care provider and specialty clinics (as applicable) to follow up with this visit and continue investigation/treatment.   Patient/family has shown adequate understanding and is agreeable to the plan.

## 2023-07-29 ENCOUNTER — EMERGENCY (EMERGENCY)
Facility: HOSPITAL | Age: 33
LOS: 0 days | Discharge: HOME | End: 2023-07-29
Attending: EMERGENCY MEDICINE
Payer: MEDICAID

## 2023-07-29 VITALS
SYSTOLIC BLOOD PRESSURE: 128 MMHG | TEMPERATURE: 98 F | HEART RATE: 80 BPM | RESPIRATION RATE: 18 BRPM | OXYGEN SATURATION: 99 % | DIASTOLIC BLOOD PRESSURE: 88 MMHG

## 2023-07-29 VITALS
HEART RATE: 78 BPM | HEIGHT: 72 IN | WEIGHT: 179.9 LBS | DIASTOLIC BLOOD PRESSURE: 102 MMHG | RESPIRATION RATE: 18 BRPM | TEMPERATURE: 98 F | SYSTOLIC BLOOD PRESSURE: 147 MMHG | OXYGEN SATURATION: 99 %

## 2023-07-29 DIAGNOSIS — Q07.8 OTHER SPECIFIED CONGENITAL MALFORMATIONS OF NERVOUS SYSTEM: ICD-10-CM

## 2023-07-29 DIAGNOSIS — M79.604 PAIN IN RIGHT LEG: ICD-10-CM

## 2023-07-29 DIAGNOSIS — M79.605 PAIN IN LEFT LEG: ICD-10-CM

## 2023-07-29 DIAGNOSIS — B20 HUMAN IMMUNODEFICIENCY VIRUS [HIV] DISEASE: ICD-10-CM

## 2023-07-29 DIAGNOSIS — G62.9 POLYNEUROPATHY, UNSPECIFIED: ICD-10-CM

## 2023-07-29 DIAGNOSIS — M54.50 LOW BACK PAIN, UNSPECIFIED: ICD-10-CM

## 2023-07-29 DIAGNOSIS — G89.29 OTHER CHRONIC PAIN: ICD-10-CM

## 2023-07-29 DIAGNOSIS — Z98.890 OTHER SPECIFIED POSTPROCEDURAL STATES: Chronic | ICD-10-CM

## 2023-07-29 DIAGNOSIS — Z88.1 ALLERGY STATUS TO OTHER ANTIBIOTIC AGENTS STATUS: ICD-10-CM

## 2023-07-29 DIAGNOSIS — Z88.6 ALLERGY STATUS TO ANALGESIC AGENT: ICD-10-CM

## 2023-07-29 LAB
ALBUMIN SERPL ELPH-MCNC: 3.3 G/DL — SIGNIFICANT CHANGE UP (ref 3.3–5)
ALP SERPL-CCNC: 66 U/L — SIGNIFICANT CHANGE UP (ref 40–120)
ALT FLD-CCNC: 25 U/L — SIGNIFICANT CHANGE UP (ref 12–78)
ANION GAP SERPL CALC-SCNC: 3 MMOL/L — LOW (ref 5–17)
APPEARANCE UR: CLEAR — SIGNIFICANT CHANGE UP
AST SERPL-CCNC: 25 U/L — SIGNIFICANT CHANGE UP (ref 15–37)
BASOPHILS # BLD AUTO: 0.02 K/UL — SIGNIFICANT CHANGE UP (ref 0–0.2)
BASOPHILS NFR BLD AUTO: 0.4 % — SIGNIFICANT CHANGE UP (ref 0–2)
BILIRUB SERPL-MCNC: 0.2 MG/DL — SIGNIFICANT CHANGE UP (ref 0.2–1.2)
BILIRUB UR-MCNC: NEGATIVE — SIGNIFICANT CHANGE UP
BUN SERPL-MCNC: 15 MG/DL — SIGNIFICANT CHANGE UP (ref 7–23)
CALCIUM SERPL-MCNC: 8.7 MG/DL — SIGNIFICANT CHANGE UP (ref 8.5–10.1)
CHLORIDE SERPL-SCNC: 110 MMOL/L — HIGH (ref 96–108)
CK SERPL-CCNC: 202 U/L — SIGNIFICANT CHANGE UP (ref 26–308)
CO2 SERPL-SCNC: 29 MMOL/L — SIGNIFICANT CHANGE UP (ref 22–31)
COLOR SPEC: YELLOW — SIGNIFICANT CHANGE UP
CREAT SERPL-MCNC: 0.89 MG/DL — SIGNIFICANT CHANGE UP (ref 0.5–1.3)
DIFF PNL FLD: NEGATIVE — SIGNIFICANT CHANGE UP
EGFR: 115 ML/MIN/1.73M2 — SIGNIFICANT CHANGE UP
EOSINOPHIL # BLD AUTO: 0.1 K/UL — SIGNIFICANT CHANGE UP (ref 0–0.5)
EOSINOPHIL NFR BLD AUTO: 2.1 % — SIGNIFICANT CHANGE UP (ref 0–6)
GLUCOSE SERPL-MCNC: 120 MG/DL — HIGH (ref 70–99)
GLUCOSE UR QL: NEGATIVE — SIGNIFICANT CHANGE UP
HCT VFR BLD CALC: 38.5 % — LOW (ref 39–50)
HGB BLD-MCNC: 12.8 G/DL — LOW (ref 13–17)
IMM GRANULOCYTES NFR BLD AUTO: 0.2 % — SIGNIFICANT CHANGE UP (ref 0–0.9)
KETONES UR-MCNC: NEGATIVE — SIGNIFICANT CHANGE UP
LEUKOCYTE ESTERASE UR-ACNC: NEGATIVE — SIGNIFICANT CHANGE UP
LYMPHOCYTES # BLD AUTO: 2.47 K/UL — SIGNIFICANT CHANGE UP (ref 1–3.3)
LYMPHOCYTES # BLD AUTO: 51.9 % — HIGH (ref 13–44)
MAGNESIUM SERPL-MCNC: 1.5 MG/DL — LOW (ref 1.6–2.6)
MCHC RBC-ENTMCNC: 30.1 PG — SIGNIFICANT CHANGE UP (ref 27–34)
MCHC RBC-ENTMCNC: 33.2 GM/DL — SIGNIFICANT CHANGE UP (ref 32–36)
MCV RBC AUTO: 90.6 FL — SIGNIFICANT CHANGE UP (ref 80–100)
MONOCYTES # BLD AUTO: 0.42 K/UL — SIGNIFICANT CHANGE UP (ref 0–0.9)
MONOCYTES NFR BLD AUTO: 8.8 % — SIGNIFICANT CHANGE UP (ref 2–14)
NEUTROPHILS # BLD AUTO: 1.74 K/UL — LOW (ref 1.8–7.4)
NEUTROPHILS NFR BLD AUTO: 36.6 % — LOW (ref 43–77)
NITRITE UR-MCNC: NEGATIVE — SIGNIFICANT CHANGE UP
PH UR: 8 — SIGNIFICANT CHANGE UP (ref 5–8)
PLATELET # BLD AUTO: 249 K/UL — SIGNIFICANT CHANGE UP (ref 150–400)
POTASSIUM SERPL-MCNC: 3.8 MMOL/L — SIGNIFICANT CHANGE UP (ref 3.5–5.3)
POTASSIUM SERPL-SCNC: 3.8 MMOL/L — SIGNIFICANT CHANGE UP (ref 3.5–5.3)
PROT SERPL-MCNC: 7.7 GM/DL — SIGNIFICANT CHANGE UP (ref 6–8.3)
PROT UR-MCNC: NEGATIVE — SIGNIFICANT CHANGE UP
RBC # BLD: 4.25 M/UL — SIGNIFICANT CHANGE UP (ref 4.2–5.8)
RBC # FLD: 12.8 % — SIGNIFICANT CHANGE UP (ref 10.3–14.5)
SODIUM SERPL-SCNC: 142 MMOL/L — SIGNIFICANT CHANGE UP (ref 135–145)
SP GR SPEC: 1.01 — SIGNIFICANT CHANGE UP (ref 1.01–1.02)
UROBILINOGEN FLD QL: NEGATIVE — SIGNIFICANT CHANGE UP
WBC # BLD: 4.76 K/UL — SIGNIFICANT CHANGE UP (ref 3.8–10.5)
WBC # FLD AUTO: 4.76 K/UL — SIGNIFICANT CHANGE UP (ref 3.8–10.5)

## 2023-07-29 PROCEDURE — 36415 COLL VENOUS BLD VENIPUNCTURE: CPT

## 2023-07-29 PROCEDURE — 96375 TX/PRO/DX INJ NEW DRUG ADDON: CPT

## 2023-07-29 PROCEDURE — 82550 ASSAY OF CK (CPK): CPT

## 2023-07-29 PROCEDURE — 85025 COMPLETE CBC W/AUTO DIFF WBC: CPT

## 2023-07-29 PROCEDURE — 96374 THER/PROPH/DIAG INJ IV PUSH: CPT

## 2023-07-29 PROCEDURE — 81003 URINALYSIS AUTO W/O SCOPE: CPT

## 2023-07-29 PROCEDURE — 93970 EXTREMITY STUDY: CPT | Mod: 26

## 2023-07-29 PROCEDURE — 93970 EXTREMITY STUDY: CPT

## 2023-07-29 PROCEDURE — 99284 EMERGENCY DEPT VISIT MOD MDM: CPT | Mod: 25

## 2023-07-29 PROCEDURE — 80053 COMPREHEN METABOLIC PANEL: CPT

## 2023-07-29 PROCEDURE — 99285 EMERGENCY DEPT VISIT HI MDM: CPT | Mod: 25

## 2023-07-29 PROCEDURE — 83735 ASSAY OF MAGNESIUM: CPT

## 2023-07-29 RX ORDER — MAGNESIUM SULFATE 500 MG/ML
1 VIAL (ML) INJECTION ONCE
Refills: 0 | Status: COMPLETED | OUTPATIENT
Start: 2023-07-29 | End: 2023-07-29

## 2023-07-29 RX ORDER — ONDANSETRON 8 MG/1
4 TABLET, FILM COATED ORAL ONCE
Refills: 0 | Status: COMPLETED | OUTPATIENT
Start: 2023-07-29 | End: 2023-07-29

## 2023-07-29 RX ORDER — MORPHINE SULFATE 50 MG/1
4 CAPSULE, EXTENDED RELEASE ORAL ONCE
Refills: 0 | Status: DISCONTINUED | OUTPATIENT
Start: 2023-07-29 | End: 2023-07-29

## 2023-07-29 RX ORDER — ACETAMINOPHEN 500 MG
1000 TABLET ORAL ONCE
Refills: 0 | Status: COMPLETED | OUTPATIENT
Start: 2023-07-29 | End: 2023-07-29

## 2023-07-29 RX ADMIN — Medication 100 GRAM(S): at 04:29

## 2023-07-29 RX ADMIN — Medication 400 MILLIGRAM(S): at 04:28

## 2023-07-29 RX ADMIN — MORPHINE SULFATE 4 MILLIGRAM(S): 50 CAPSULE, EXTENDED RELEASE ORAL at 03:00

## 2023-07-29 NOTE — ED PROVIDER NOTE - OBJECTIVE STATEMENT
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  Patient notes that the pain is so severe that it is making it difficult for him to walk.  Patient notes that this is worse than his usual chronic pain.  Patient has been seen in the emergency department several times for similar complaints.  Patient notes that he is allergic to nonsteroidal pain medication and his pain is usually treated with morphine or Dilaudid.  Patient denies any incontinence or urinary retention.  He denies any recent trauma or heavy lifting.

## 2023-07-29 NOTE — PHYSICAL THERAPY INITIAL EVALUATION ADULT - PERTINENT HX OF CURRENT PROBLEM, REHAB EVAL
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  Patient notes that the pain is so severe that it is making it difficult for him to walk.

## 2023-07-29 NOTE — ED PROVIDER NOTE - NSICDXPASTMEDICALHX_GEN_ALL_CORE_FT
PAST MEDICAL HISTORY:  Asthma     Chronic sinusitis     Closed fracture of multiple ribs of right side, initial encounter     Cocaine abuse     GBS (Guillain [place] syndrome)     HIV (human immunodeficiency virus infection) from birth    Homeless

## 2023-07-29 NOTE — ED ADULT NURSE NOTE - NSFALLRISKINTERV_ED_ALL_ED

## 2023-07-29 NOTE — ED PROVIDER NOTE - CLINICAL SUMMARY MEDICAL DECISION MAKING FREE TEXT BOX
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  Patient notes that the pain is so severe that it is making it difficult for him to walk.  Patient notes that this is worse than his usual chronic pain.  Patient has been seen in the emergency department several times for similar complaints.  Patient notes that he is allergic to nonsteroidal pain medication and his pain is usually treated with morphine or Dilaudid.  Patient denies any incontinence or urinary retention.  He denies any recent trauma or heavy lifting.     We will get bilateral lower extremity Dopplers to rule out DVT, CBC, BMP, CK pain control as needed and reassess.  The patient does not have any red flags concerning for cauda equina, epidural abscess or epidural hematoma.  The patient does have multiple emergency department visits for similar complaints requesting an narcotic pain medication so there is some concern for drug-seeking behavior.

## 2023-07-29 NOTE — ED PROVIDER NOTE - PROGRESS NOTE DETAILS
Patient resting comfortably in bed.  Patient told nursing staff that he would like to see a physical therapy and social work due to his difficulty ambulating.  Fortunato Anaya, DO

## 2023-07-29 NOTE — CHART NOTE - NSCHARTNOTEFT_GEN_A_CORE
SW consulted for possible BRAYDEN placement/ r/o possible DVT.     Pt was discharged prior to beginning of SW's shift. DVT was ruled out. SW consult closed.

## 2023-07-29 NOTE — ED ADULT TRIAGE NOTE - CHIEF COMPLAINT QUOTE
PT to ED from shelter with c/o b/l le pain. PT states pain is due to Guillain-Stout flare up. Denies fall and injury. Ambulatory with little to no assistance.

## 2023-07-29 NOTE — ED ADULT NURSE NOTE - CHIEF COMPLAINT QUOTE
PT to ED from shelter with c/o b/l le pain. PT states pain is due to Guillain-Darien flare up. Denies fall and injury. Ambulatory with little to no assistance.

## 2023-07-29 NOTE — ED PROVIDER NOTE - NS CPE EDP MUSC LUMBAR LOC
Decreased flexion and extension of lumbar spine due to pain.  Patient has tenderness to palpation of bilateral lumbar paraspinal muscles/limited ROM/tenderness

## 2023-07-29 NOTE — ED ADULT NURSE NOTE - BIRTH SEX
Detail Level: Detailed Other (Free Text): All test spots examined, and areas of TCA peel application appear to have healed best with no scarring and no residual sebaceous hyperplasia.\\n\\nReviewed that 2-3 treatments may be required to obtain full benefit.\\n\\nSkin was degreased with alcohol. TCA Cross peel 75% applied as spot treatment with the wooden tip of a broken CTA to specifically spot treat each area of sebaceous hyperplasia until moderate frosting achieved. Due to patient movement at one point in procedure, area on left lower cheek streaked slightly. Saline-soaked gauze applied post procedure. Aquaphor and sunscreen applied. Post care reviewed\\n\\n$750 Render Risk Assessment In Note?: no Note Text (......Xxx Chief Complaint.): This diagnosis correlates with the Male

## 2023-07-29 NOTE — PHYSICAL THERAPY INITIAL EVALUATION ADULT - ADDITIONAL COMMENTS
Pt. independent in all aspects of mobility, ambulatory without AD. Pt. is homeless.- information from Jul 2023.

## 2023-07-29 NOTE — ED PROVIDER NOTE - EXTREMITY EXAM
tenderness to palpation of bilateral lower extremities in the calf without swelling/no deformity, pain or tenderness, no restriction of movement/left lower extremity findings/right lower extremity findings

## 2023-07-29 NOTE — ED PROVIDER NOTE - PATIENT PORTAL LINK FT
You can access the FollowMyHealth Patient Portal offered by [facility] by registering at the following website: [email or web address]. By joining [employer]’s FollowMyHealth portal, you will also be able to view your health information using other applications (apps) compatible with our system.

## 2023-07-29 NOTE — ED ADULT NURSE NOTE - OBJECTIVE STATEMENT
Pt in ED c/o bilat leg pain that started 4 days ago that progressively got worse.  Pt breathing symmetrical and unlabored, #20 IV inserted into RAC, bloods drawn and sent to lab.  Pt in no acute distress at this time.

## 2023-07-31 ENCOUNTER — TRANSCRIPTION ENCOUNTER (OUTPATIENT)
Age: 33
End: 2023-07-31

## 2023-08-01 ENCOUNTER — EMERGENCY (EMERGENCY)
Facility: HOSPITAL | Age: 33
LOS: 0 days | Discharge: HOME | End: 2023-08-01
Attending: STUDENT IN AN ORGANIZED HEALTH CARE EDUCATION/TRAINING PROGRAM
Payer: MEDICAID

## 2023-08-01 VITALS
OXYGEN SATURATION: 97 % | SYSTOLIC BLOOD PRESSURE: 129 MMHG | DIASTOLIC BLOOD PRESSURE: 69 MMHG | TEMPERATURE: 98 F | HEART RATE: 91 BPM | RESPIRATION RATE: 18 BRPM

## 2023-08-01 VITALS
OXYGEN SATURATION: 98 % | TEMPERATURE: 98 F | HEART RATE: 93 BPM | RESPIRATION RATE: 16 BRPM | WEIGHT: 179.9 LBS | DIASTOLIC BLOOD PRESSURE: 71 MMHG | SYSTOLIC BLOOD PRESSURE: 134 MMHG | HEIGHT: 72 IN

## 2023-08-01 DIAGNOSIS — Z98.890 OTHER SPECIFIED POSTPROCEDURAL STATES: Chronic | ICD-10-CM

## 2023-08-01 DIAGNOSIS — Z59.00 HOMELESSNESS UNSPECIFIED: ICD-10-CM

## 2023-08-01 DIAGNOSIS — M79.661 PAIN IN RIGHT LOWER LEG: ICD-10-CM

## 2023-08-01 DIAGNOSIS — Z86.59 PERSONAL HISTORY OF OTHER MENTAL AND BEHAVIORAL DISORDERS: ICD-10-CM

## 2023-08-01 DIAGNOSIS — R20.0 ANESTHESIA OF SKIN: ICD-10-CM

## 2023-08-01 DIAGNOSIS — M54.9 DORSALGIA, UNSPECIFIED: ICD-10-CM

## 2023-08-01 DIAGNOSIS — G61.0 GUILLAIN-BARRE SYNDROME: ICD-10-CM

## 2023-08-01 DIAGNOSIS — Z88.1 ALLERGY STATUS TO OTHER ANTIBIOTIC AGENTS STATUS: ICD-10-CM

## 2023-08-01 DIAGNOSIS — B20 HUMAN IMMUNODEFICIENCY VIRUS [HIV] DISEASE: ICD-10-CM

## 2023-08-01 DIAGNOSIS — G89.29 OTHER CHRONIC PAIN: ICD-10-CM

## 2023-08-01 DIAGNOSIS — J45.909 UNSPECIFIED ASTHMA, UNCOMPLICATED: ICD-10-CM

## 2023-08-01 DIAGNOSIS — M79.662 PAIN IN LEFT LOWER LEG: ICD-10-CM

## 2023-08-01 DIAGNOSIS — Z88.6 ALLERGY STATUS TO ANALGESIC AGENT: ICD-10-CM

## 2023-08-01 PROCEDURE — 99283 EMERGENCY DEPT VISIT LOW MDM: CPT

## 2023-08-01 PROCEDURE — 99284 EMERGENCY DEPT VISIT MOD MDM: CPT

## 2023-08-01 RX ORDER — OXYCODONE HYDROCHLORIDE 5 MG/1
5 TABLET ORAL ONCE
Refills: 0 | Status: DISCONTINUED | OUTPATIENT
Start: 2023-08-01 | End: 2023-08-01

## 2023-08-01 RX ORDER — ACETAMINOPHEN 500 MG
975 TABLET ORAL ONCE
Refills: 0 | Status: COMPLETED | OUTPATIENT
Start: 2023-08-01 | End: 2023-08-01

## 2023-08-01 RX ORDER — METHOCARBAMOL 500 MG/1
1500 TABLET, FILM COATED ORAL ONCE
Refills: 0 | Status: COMPLETED | OUTPATIENT
Start: 2023-08-01 | End: 2023-08-01

## 2023-08-01 RX ORDER — LIDOCAINE 4 G/100G
1 CREAM TOPICAL ONCE
Refills: 0 | Status: COMPLETED | OUTPATIENT
Start: 2023-08-01 | End: 2023-08-01

## 2023-08-01 RX ADMIN — METHOCARBAMOL 1500 MILLIGRAM(S): 500 TABLET, FILM COATED ORAL at 18:25

## 2023-08-01 RX ADMIN — Medication 975 MILLIGRAM(S): at 18:25

## 2023-08-01 RX ADMIN — OXYCODONE HYDROCHLORIDE 5 MILLIGRAM(S): 5 TABLET ORAL at 18:25

## 2023-08-01 SDOH — ECONOMIC STABILITY - HOUSING INSECURITY: HOMELESSNESS UNSPECIFIED: Z59.00

## 2023-08-01 NOTE — ED ADULT NURSE NOTE - NSFALLUNIVINTERV_ED_ALL_ED
Bed/Stretcher in lowest position, wheels locked, appropriate side rails in place/Call bell, personal items and telephone in reach/Instruct patient to call for assistance before getting out of bed/chair/stretcher/Non-slip footwear applied when patient is off stretcher/[place] to call system/Physically safe environment - no spills, clutter or unnecessary equipment/Purposeful proactive rounding/Room/bathroom lighting operational, light cord in reach

## 2023-08-01 NOTE — ED PROVIDER NOTE - NSICDXPASTMEDICALHX_GEN_ALL_CORE_FT
PAST MEDICAL HISTORY:  Asthma     Chronic sinusitis     Closed fracture of multiple ribs of right side, initial encounter     Cocaine abuse     GBS (Guillain [place] syndrome)     HIV (human immunodeficiency virus infection) from birth    Homeless

## 2023-08-01 NOTE — ED ADULT NURSE NOTE - NSICDXPASTMEDICALHX_GEN_ALL_CORE_FT
PAST MEDICAL HISTORY:  Asthma     Chronic sinusitis     Closed fracture of multiple ribs of right side, initial encounter     Cocaine abuse     GBS (Guillain Morgan[place] syndrome)     HIV (human immunodeficiency virus infection) from birth    Homeless

## 2023-08-01 NOTE — ED PROVIDER NOTE - CLINICAL SUMMARY MEDICAL DECISION MAKING FREE TEXT BOX
Patient presents to the ED for back pain.  Patient has chronic back pain.  No change in symptoms.  Neurovascularly intact.  Vitally stable.  Pain control given in the ED.  Patient feeling better. Ambulating independently in ED. Tolerating PO. Stable for dc. Patient understands return precautions and f/u.

## 2023-08-01 NOTE — ED PROVIDER NOTE - NSFOLLOWUPINSTRUCTIONS_ED_ALL_ED_FT
Back Pain    Back pain is very common in adults. The cause of back pain is rarely dangerous and the pain often gets better over time. The cause of your back pain may not be known and may include strain of muscles or ligaments, degeneration of the spinal disks, or arthritis. Occasionally the pain may radiate down your leg(s). Over-the-counter medicines to reduce pain and inflammation are often the most helpful. Stretching and remaining active frequently helps the healing process.     SEEK IMMEDIATE MEDICAL CARE IF YOU HAVE ANY OF THE FOLLOWING SYMPTOMS: bowel or bladder control problems, unusual weakness or numbness in your arms or legs, nausea or vomiting, abdominal pain, fever, dizziness/lightheadedness.    1) Please follow-up with your primary care doctor in the next 5-7 days.  Please call tomorrow for an appointment.  If you cannot follow-up with your primary care doctor please return to the ED for any urgent issues.  2) You were given a copy of the tests performed today.  Please bring the results with you and review them with your primary care doctor.  3) If you have any worsening of symptoms or any other concerns please return to the ED immediately.  4) Please continue taking your home medications as directed.

## 2023-08-01 NOTE — ED PROVIDER NOTE - PATIENT PORTAL LINK FT
You can access the FollowMyHealth Patient Portal offered by [facility] by registering at the following website: [email or web address]. By joining [employer]s FollowMyHealth portal, you will also be able to view your health information using other applications (apps) compatible with our system.

## 2023-08-01 NOTE — ED ADULT NURSE NOTE - NSICDXFAMILYHX_GEN_ALL_CORE_FT
FAMILY HISTORY:  Mother  Still living? Unknown  FH: HIV infection, Age at diagnosis: Age Unknown     ibuprofen (100mg/5ml) 10 ml every 6 hours as needed for pain  keep cast dry  follow up with Dr. Hughes - call for appointment  return to ER if worsening symptoms or any questions or concerns    Cast or Splint Care, Pediatric  Casts and splints are supports that are worn to protect broken bones and other injuries. A cast or splint may hold a bone still and in the correct position while it heals. Casts and splints may also help ease pain, swelling, and muscle spasms.    A cast is a hardened support that is usually made of fiberglass or plaster. It is custom-fit to the body and it offers more protection than a splint. It cannot be taken off and put back on. A splint is a type of soft support that is usually made from cloth and elastic. It can be adjusted or taken off as needed.    Your child may need a cast or a splint if he or she:    Has a broken bone.  Has a soft-tissue injury.  Needs to keep an injured body part from moving (keep it immobile) after surgery.    How to care for your child's cast  Do not allow your child to stick anything inside the cast to scratch the skin. Sticking something in the cast increases your child's risk of infection.  Check the skin around the cast every day. Tell your child's health care provider about any concerns.  You may put lotion on dry skin around the edges of the cast. Do not put lotion on the skin underneath the cast.  Keep the cast clean.  ImageIf the cast is not waterproof:    Do not let it get wet.  Cover it with a watertight covering when your child takes a bath or a shower.    How to care for your child's splint  Have your child wear it as told by your child's health care provider. Remove it only as told by your child's health care provider.  Loosen the splint if your child's fingers or toes tingle, become numb, or turn cold and blue.  Keep the splint clean.  ImageIf the splint is not waterproof:    Do not let it get wet.  Cover it with a watertight covering when your child takes a bath or a shower.    Follow these instructions at home:  Bathing     Do not have your child take baths or swim until his or her health care provider approves. Ask your child's health care provider if your child can take showers. Your child may only be allowed to take sponge baths for bathing.  If your child's cast or splint is not waterproof, cover it with a watertight covering when he or she takes a bath or shower.  Managing pain, stiffness, and swelling     Have your child move his or her fingers or toes often to avoid stiffness and to lessen swelling.  Have your child raise (elevate) the injured area above the level of his or her heart while he or she is sitting or lying down.  Safety     Do not allow your child to use the injured limb to support his or her body weight until your child's health care provider says that it is okay.  Have your child use crutches or other assistive devices as told by your child's health care provider.  General instructions     Do not allow your child to put pressure on any part of the cast or splint until it is fully hardened. This may take several hours.  Have your child return to his or her normal activities as told by his or her health care provider. Ask your child's health care provider what activities are safe for your child.  Give over-the-counter and prescription medicines only as told by your child's health care provider.  Keep all follow-up visits as told by your child’s health care provider. This is important.  Contact a health care provider if:  Your child’s cast or splint gets damaged.  Your child's skin under or around the cast becomes red or raw.  Your child’s skin under the cast is extremely itchy or painful.  Your child's cast or splint feels very uncomfortable.  Your child’s cast or splint is too tight or too loose.  Your child’s cast becomes wet or it develops a soft spot or area.  Your child gets an object stuck under the cast.  Get help right away if:  Your child's pain is getting worse.  Your child’s injured area tingles, becomes numb, or turns cold and blue.  The part of your child's body above or below the cast is swollen or discolored.  Your child cannot feel or move his or her fingers or toes.  There is fluid leaking through the cast.  Your child has severe pain or pressure under the cast.  This information is not intended to replace advice given to you by your health care provider. Make sure you discuss any questions you have with your health care provider.

## 2023-08-01 NOTE — ED ADULT NURSE NOTE - OBJECTIVE STATEMENT
pt presents to ed for evaluation back  pain radiating down bilateral legs with associated numbness x few weeks- pt frequents ED for similar symptoms. vss

## 2023-08-01 NOTE — ED PROVIDER NOTE - OBJECTIVE STATEMENT
32 yo male with PMHx of Guillain barre syndrome, chronic sinusitis, cocaine abuse, asthma, HIV presents to ED BIBA c/o back pain radiating down b/l legs x few weeks. endorses LE numbness, urinary incontinence and retention. unsure of fever. denies hx of iv drug use, recent falls. allergy to toridol and ceclor.

## 2023-08-28 ENCOUNTER — EMERGENCY (EMERGENCY)
Facility: HOSPITAL | Age: 33
LOS: 1 days | Discharge: HOME | End: 2023-08-28
Attending: EMERGENCY MEDICINE | Admitting: EMERGENCY MEDICINE
Payer: MEDICAID

## 2023-08-28 VITALS
HEART RATE: 89 BPM | DIASTOLIC BLOOD PRESSURE: 79 MMHG | HEIGHT: 72 IN | TEMPERATURE: 99 F | SYSTOLIC BLOOD PRESSURE: 132 MMHG | OXYGEN SATURATION: 100 % | RESPIRATION RATE: 16 BRPM

## 2023-08-28 DIAGNOSIS — Z98.890 OTHER SPECIFIED POSTPROCEDURAL STATES: Chronic | ICD-10-CM

## 2023-08-28 PROCEDURE — 99285 EMERGENCY DEPT VISIT HI MDM: CPT

## 2023-08-28 RX ORDER — OXYCODONE HYDROCHLORIDE 5 MG/1
5 TABLET ORAL ONCE
Refills: 0 | Status: DISCONTINUED | OUTPATIENT
Start: 2023-08-28 | End: 2023-08-28

## 2023-08-28 RX ORDER — ACETAMINOPHEN 500 MG
975 TABLET ORAL ONCE
Refills: 0 | Status: COMPLETED | OUTPATIENT
Start: 2023-08-28 | End: 2023-08-28

## 2023-08-28 RX ORDER — LIDOCAINE 4 G/100G
1 CREAM TOPICAL ONCE
Refills: 0 | Status: COMPLETED | OUTPATIENT
Start: 2023-08-28 | End: 2023-08-28

## 2023-08-28 RX ADMIN — OXYCODONE HYDROCHLORIDE 5 MILLIGRAM(S): 5 TABLET ORAL at 23:16

## 2023-08-28 RX ADMIN — LIDOCAINE 1 PATCH: 4 CREAM TOPICAL at 23:30

## 2023-08-28 RX ADMIN — Medication 975 MILLIGRAM(S): at 23:16

## 2023-08-28 NOTE — ED PROVIDER NOTE - CPE EDP HEME LYMPH NORM
Called patients son with bone scan results. Instructed him to have his Mom start alendronate weekly with the following instructions:  Take on empty stomach, Sit up for 1 hour after, Take with 8 oz of water and no meds or food for one hour after normal...

## 2023-08-28 NOTE — ED PROVIDER NOTE - OBJECTIVE STATEMENT
34 yo male with PMHx of Guillain barre syndrome, chronic sinusitis, cocaine abuse, asthma, HIV presents to ED BIBA c/o acute on chronic back back pain radiating down b/l legs 3 days. Pt endorses endorses LE numbness. Pt states he has had urinary and fecal incontinence in the past including months ago and . unsure of fever. denies hx of iv drug use, recent falls. allergy to Todadol and ceclor. 34 yo male with PMHx of Guillain barre syndrome, chronic sinusitis, cocaine abuse, asthma, HIV presents to ED BIBA c/o acute on chronic back pain radiating down b/l legs 3 days. Pt endorses chronic LE numbness worse on the L leg. Pt also endorses difficulty ambulating x 3 days.  Pt states he has had urinary and fecal incontinence in the past including months ago and states that has occurred again for the past 3 days. Pt declines a rectal exam in the ED. Pt denies fevers/chills/nightsweats/ H/O epidural or other instrumentation.  Denies hx of iv drug use, recent falls. allergy to Todadol and ceclor. Pt states he does not follow with a spine specialist or pain management and declines a rectal exam for tone in the ED. Pt refuses MRI unless he receives "5 of Ativan" prior to the study. 32 yo male with PMHx of Guillain barre syndrome, chronic sinusitis, cocaine abuse, asthma, HIV presents to ED BIBA c/o acute on chronic back pain radiating down b/l legs 3 days. Pt endorses chronic LE numbness worse on the L leg. Pt also endorses difficulty ambulating x 3 days.  Pt states he has had urinary and fecal incontinence in the past including months ago and states that has occurred again for the past 3 days. Pt declines a rectal exam in the ED. Pt denies fevers/chills/nightsweats/ H/O epidural or other instrumentation.  Denies hx of iv drug use, recent falls. allergy to Todadol and ceclor. Pt states he does not follow with a spine specialist or pain management and declines a rectal exam for tone in the ED. Pt refuses MRI, had a recent MRI in July with no abnormality.

## 2023-08-28 NOTE — ED PROVIDER NOTE - CPE EDP NEURO NORM
[facility] ED  EMERGENCY DEPARTMENT ENCOUNTER      Pt Name: Quintin Pimentel  MRN: [id number]  [place] 1988  Date of evaluation: 8/8/2019  Provider: Elena Del Real II, PA-C    CHIEF COMPLAINT       Chief Complaint   Patient presents with    Rib Pain     continued pain from her visit in june; right sided       HISTORY OF PRESENT ILLNESS    Quintin Pimentel is a 31 y.o. female who presents to the emergency department from home complains of pain on the right side of her ribs ever since breaking her right seventh rib on June 2, 2019. She states is been hurting more the past week and it hurts especially when she moves or when she tries to lie on that side. She states it never completely got better and has hurt constantly but now it is hurting worse. She states it does hurt to take a deep breath but she denies being short of breath denies pain or swelling extremities. His cough fever or chills. Triage notes and Nursing notes were reviewed by myself. Any discrepancies are addressed above.     PAST MEDICAL HISTORY     Past Medical History:   Diagnosis Date    Anxiety and depression     Depression     Female stress incontinence 11/23/2015    Obesity     Type II or unspecified type diabetes mellitus without mention of complication, not stated as uncontrolled     Wears glasses        SURGICAL HISTORY       Past Surgical History:   Procedure Laterality Date    TUBAL LIGATION      WISDOM TOOTH EXTRACTION         CURRENT MEDICATIONS       Discharge Medication List as of 8/8/2019  2:20 PM      CONTINUE these medications which have NOT CHANGED    Details   baclofen (LIORESAL) 10 MG tablet TAKE 1 TABLET BY MOUTH THREE TIMES DAILY FOR 10 DAYS, Disp-30 tablet, R-0Please consider 90 day supplies to promote better adherenceNormal      levocetirizine (XYZAL) 5 MG tablet Take 1 tablet by mouth nightly, Disp-90 tablet, R-3Normal      amitriptyline (ELAVIL) 25 MG tablet Take 1 tablet by mouth nightly, with tenderness but there is no step-off or deformity noted no flail segment noted. Skin free of any obvious rashes or lesions. Extremities without edema. Good affect. Pleasant patient. DIAGNOSTIC RESULTS       RADIOLOGY: (none if blank)   Interpretation per the Radiologistbelow, if available at the time of this note:    XR CHEST STANDARD (2 VW)   Final Result   Increasing periosteal reaction about right 7th rib fracture compatible with   healing fracture. Superimposed acute injury not excluded. No airspace consolidation. EMERGENCY DEPARTMENT COURSE andMedical Decision Making:     Vitals:    Vitals:    08/08/19 1315   BP: (!) 119/92   Pulse: 80   Resp: 18   Temp: 98.8 °F (37.1 °C)   TempSrc: Tympanic   SpO2: 97%       MDM/     She is medicated with Toradol for pain. Strict return precautions and follow up instructions were discussed with the patient with which the patient agrees    ED Medications administered this visit:    Medications   ketorolac (TORADOL) injection 30 mg (30 mg Intramuscular Given 8/8/19 1344)       CONSULTS: (None if blank)  None    Procedures: (None if blank)       CLINICAL       1.  Rib pain on right side          DISPOSITION/PLAN   DISPOSITION Decision To Discharge 08/08/2019 02:19:34 PM      PATIENT REFERRED TO:  Anu Alvarado, [address]  [address] [place] [address]  [phone]    In 4 days        DISCHARGE MEDICATIONS:  Discharge Medication List as of 8/8/2019  2:20 PM      START taking these medications    Details   naproxen (NAPROSYN) 500 MG tablet Take 1 tablet by mouth 2 times daily, Disp-14 tablet, R-0Print                    (Please note that portions of this note were completed with a voice recognition program.  Efforts were made to edit the dictations but occasionallywords are mis-transcribed.)      Domi Martníez II, PA-C (electronically signed)           Domi Martínez II, PA-C  08/08/19 6895 - - -

## 2023-08-28 NOTE — ED PROVIDER NOTE - PATIENT PORTAL LINK FT
You can access the FollowMyHealth Patient Portal offered by [facility] by registering at the following website: [email or web address]. By joining [employer]’s FollowMyHealth portal, you will also be able to view your health information using other applications (apps) compatible with our system.

## 2023-08-28 NOTE — ED PROVIDER NOTE - CRANIAL NERVE AND PUPILLARY EXAM
5/5 strenght and intact sensation bilaterally in upper and lower extremity including normal plantar flexion. Straight leg raise is limited by pain./cranial nerves 2-12 intact 5/5 strength and intact sensation bilaterally in upper and lower extremity including normal plantar flexion. Straight leg raise is limited by pain./cranial nerves 2-12 intact

## 2023-08-28 NOTE — ED PROVIDER NOTE - ATTENDING APP SHARED VISIT CONTRIBUTION OF CARE
33-year-old male past medical history of HIV last CD4 and viral load were still detectable Guillain-Barré syndrome secondary to flu vaccine presenting with acute on chronic back pain.  Has had back pain for quite a while based on previous notes.  The pain radiates down his bilateral legs.  States has been hard for him to walk secondary to the pain.  Patient says he had some urinary and fecal incontinence however this is happened multiple times in the past with regards to this pain.  There are no fevers.    Vitals: I have reviewed the patients vital signs  General: nontoxic appearing  HEENT: Atraumatic, normocephalic, airway patent  Eyes: EOMI, tracking appropriately  Neck: no tracheal deviation  Chest/Lungs: no trauma, symmetric chest rise, speaking in complete sentences,  no resp distress  Heart: skin and extremities well perfused, regular rate and rhythm  Neuro: A+Ox3, appears non focal  MSK: no deformities, reported midline lower lumbar pain no step-offs  Skin: no cyanosis, no jaundice   Psych:  Normal mood and affect    33-year-old male past medical history of Guillain-Barré acute on chronic back pain and HIV presenting with severe back pain.  The patient has multiple visits for similar matters in multiple hospitals through [facility] and [facility] in [place].  He recently had an MRI in July for similar symptoms that showed no abnormalities.  Here in the emergency department the patient is declining a rectal exam to assess for rectal tone.  He is also refusing MRI and is requesting pain medication.  He will help control his symptoms at this time.  Patient has been ambulatory within the emergency department.  We will work with social work as far as getting patient back to [facility] and with housing issues.

## 2023-08-28 NOTE — ED ADULT NURSE NOTE - OBJECTIVE STATEMENT
patient alert and oriented x4 ambulatory at baseline, c/o back pain, bilateral leg pain, trouble ambulating and urinary and fecal incontinence x 3 days. patient denies any recent injury, trauma or heavy lifting. patient denies shortness of breath, chest pain, nausea or vomiting. 20G IV placed in L forearm, labs sent. patient pending results and CT at this time. report endorsed to ANDREA Greenberg.

## 2023-08-28 NOTE — ED ADULT NURSE NOTE - NSFALLRISKINTERV_ED_ALL_ED

## 2023-08-28 NOTE — ED PROVIDER NOTE - PROGRESS NOTE DETAILS
ANA Pearce: ROSALINO arranged for transport back to [place]. Pt will have to go through the intake process again. ANA Pearce: ROSALINO arranged for transport back to [place]. Pt will have to go through the intake process again. Pt seen ambulating in the ED prior to discharge. Pt asking for food while awaiting for transport.

## 2023-08-28 NOTE — ED PROVIDER NOTE - NSICDXPASTMEDICALHX_GEN_ALL_CORE_FT
PAST MEDICAL HISTORY:  Asthma     Chronic sinusitis     Closed fracture of multiple ribs of right side, initial encounter     Cocaine abuse     GBS (Guillain [place] syndrome)     HIV (human immunodeficiency virus infection) from birth    Homeless

## 2023-08-28 NOTE — ED ADULT TRIAGE NOTE - CHIEF COMPLAINT QUOTE
pt c/o back, bilateral leg pain, trouble ambulating with urinary and fecal  incontinence x 3 days. pt denies any recent inj, trauma or heavy lifting. Pt states hes been here multiple times for similar. No complaints of chest pain, nausea, dizziness, vomiting  SOB, fever, chills verbalized. pt c/o back, bilateral leg pain, trouble ambulating with urinary and fecal  incontinence x 3 days. pt denies any recent inj, trauma or heavy lifting. Pt states hes been here multiple times for similar. pt has HX of HIV,  guillain barre  No complaints of chest pain, nausea, dizziness, vomiting  SOB, fever, chills verbalized.

## 2023-08-28 NOTE — ED PROVIDER NOTE - NSFOLLOWUPINSTRUCTIONS_ED_ALL_ED_FT
**You were seen in the emergency room for back pain.  While in the emergency room you received symptomatic control, in addition to this you were seen by social work to help aid and housing concerns.  Your lab results and findings are attached to your discharge papers.  Please return if any new, worsening, or concerning symptoms develop.**    Managing Chronic Back Pain  Chronic back pain is back pain that lasts for 12 weeks or longer. It often affects the lower back. Back pain may feel like a muscle ache or a sharp, stabbing pain. It can be mild, moderate, or severe.    If you have been diagnosed with chronic back pain, there are things you can do to manage your symptoms. You may have to try different things to see what works best for you. Your health care provider may also give you specific instructions.    How to manage lifestyle changes  Treating chronic back pain often starts with rest and pain relief, followed by exercises to restore movement and strength to your back (physical therapy). You may need surgery if other treatments do not help, or if your pain is caused by a condition or an injury. Follow your treatment plan as told by your health care provider. This may include:  Relaxation techniques.  Talk therapy or counseling with a mental health specialist. A form of talk therapy called cognitive behavioral therapy (CBT) can be especially helpful. This therapy helps you set goals and follow up on the changes that you make.  Acupuncture or massage therapy.  Local electrical stimulation.  Injections. These deliver numbing or pain-relieving medicines into your spine or the area of pain.  How to recognize changes in your chronic back pain  Your condition may improve with treatment. However, back pain may not go away or may get worse over time. Watch your symptoms carefully and let your health care provider know if your symptoms get worse or do not improve.    Your back pain may be getting worse if you have:  Pain that begins to cause problems with posture.  Pain that gets worse when you are sitting, standing, walking, bending, or lifting.  Pain that affects you while you are active, or at rest, or both.  Pain that eventually makes it hard to move around (limits mobility).  Pain that occurs with fever, weight loss, or difficulty urinating.  Pain that causes numbness and tingling.  How to use body mechanics and posture to help with pain  Healthy body mechanics and good posture can help to relieve stress on your back. Body mechanics refers to the movements and positions of your body during your daily activities. Posture is part of body mechanics. Good posture means:  Your spine is in its natural S-curve, or neutral, position.  Your shoulders are pulled back slightly.  Your head is not tipped forward.  Follow these guidelines to improve your posture and body mechanics in your everyday activities.    Standing      When standing, keep your spine neutral and your feet about hip-width apart. Keep your knees slightly bent. Your ears, shoulders, and hips should line up.  When you do a task in which you  one place for a long time, place one foot on a stable object that is 2–4 inches (5–10 cm) high, such as a footstool. This helps keep your spine neutral.  Sitting      When sitting, keep your spine neutral and your feet flat on the floor. Use a footrest, if necessary, and keep your thighs parallel to the floor. Avoid rounding your shoulders, and avoid tilting your head forward.  When working at a desk or a computer, keep your desk at a height where your hands are slightly lower than your elbows. Slide your chair under your desk so you are close enough to maintain good posture.  When working at a computer, place your monitor at a height where you are looking straight ahead and you do not have to tilt your head forward or downward to view the screen.  Lifting      Keep your feet at least shoulder-width apart and tighten the muscles of your abdomen.  Bend your knees and hips and keep your spine neutral. Be sure to lift using the strength of your legs, not your back. Do not lock your knees straight out.  Always ask for help to lift heavy or awkward objects.  Resting      When lying down and resting, avoid positions that are most painful.  If you have pain with activities such as sitting, bending, stooping, or squatting, lie in a position in which your body does not bend very much. For example, avoid curling up on your side with your arms and knees near your chest (fetal position).  If you have pain with activities such as standing for a long time or reaching with your arms, lie with your spine in a neutral position and bend your knees slightly. Try:  Lying on your side with a pillow between your knees.  Lying on your back with a pillow under your knees.  Follow these instructions at home:  Medicines    Treatment may include over-the-counter or prescription medicines for pain and inflammation that are taken by mouth or applied to the skin. Another treatment may include muscle relaxants. Take over-the-counter and prescription medicines only as told by your health care provider.  Ask your health care provider if the medicine prescribed to you:  Requires you to avoid driving or using machinery.  Can cause constipation. You may need to take these actions to prevent or treat constipation:  Drink enough fluid to keep your urine pale yellow.  Take over-the-counter or prescription medicines.  Eat foods that are high in fiber, such as beans, whole grains, and fresh fruits and vegetables.  Limit foods that are high in fat and processed sugars, such as fried or sweet foods.  Lifestyle    Do not use any products that contain nicotine or tobacco, such as cigarettes, e-cigarettes, and chewing tobacco. If you need help quitting, ask your health care provider.  Eat a healthy diet that includes foods such as vegetables, fruits, fish, and lean meats.  Work with your health care provider to achieve or maintain a healthy weight.  General instructions    Get regular exercise as told. Exercise improves flexibility and strength.  If physical therapy was prescribed, do exercises as told by your health care provider.  Use ice or heat therapy as told by your health care provider.  Keep all follow-up visits as told by your health care provider. This is important.  Where can I get support?  Consider joining a support group for people managing chronic back pain. Ask your health care provider about support groups in your area. You can also find online and in-person support groups through:  The American Chronic Pain Association: theacpa.org  Pain Connection Program: painconnection.org  Contact a health care provider if:  You have pain that is not relieved with rest or medicine.  Your pain gets worse, or you have new pain.  You have a fever.  You have rapid weight loss.  You have trouble doing your normal activities.  Get help right away if:  You have weakness or numbness in one or both of your legs or feet.  You have trouble controlling your bladder or your bowels.  You have severe back pain and have any of the following:  Nausea or vomiting.  Abdominal pain.  Shortness of breath or you faint.  Summary  Chronic back pain is often treated with rest, pain relief, and physical therapy.  Talk therapy, acupuncture, massage, and local electrical stimulation may help.  Follow your treatment plan as told by your health care provider.  Joining a support group may help you manage chronic back pain.  This information is not intended to replace advice given to you by your health care provider. Make sure you discuss any questions you have with your health care provider.

## 2023-08-28 NOTE — ED ADULT NURSE NOTE - NSICDXPASTMEDICALHX_GEN_ALL_CORE_FT
PAST MEDICAL HISTORY:  Asthma     Chronic sinusitis     Closed fracture of multiple ribs of right side, initial encounter     Cocaine abuse     GBS (Guillain [place] syndrome)     HIV (human immunodeficiency virus infection) from birth    Homeless

## 2023-08-28 NOTE — ED ADULT NURSE NOTE - CHIEF COMPLAINT QUOTE
pt c/o back, bilateral leg pain, trouble ambulating with urinary and fecal  incontinence x 3 days. pt denies any recent inj, trauma or heavy lifting. Pt states hes been here multiple times for similar. pt has HX of HIV,  guillain barre  No complaints of chest pain, nausea, dizziness, vomiting  SOB, fever, chills verbalized.

## 2023-08-28 NOTE — ED PROVIDER NOTE - CLINICAL SUMMARY MEDICAL DECISION MAKING FREE TEXT BOX
32 yo male with PMHx of Guillain barre syndrome, chronic sinusitis, cocaine abuse, asthma, HIV presents to ED BIBA c/o acute on chronic back pain radiating down b/l legs 3 days. Pt endorses chronic LE numbness worse on the L leg. Pt also endorses difficulty ambulating x 3 days.  Pt states he has had urinary and fecal incontinence in the past including months ago and states that has occurred again for the past 3 days. Plan is CT imaging of the T and L spine with IV contrast. Aside from immunocompromised status has no risk factors for epidural abscess and pt has no H/O recent trauma. Will control the patient's pain with Tylenol, Lidoderm and Oxycodone, check labs to eval for anemia or electrolyte disturbance and reassess. 34 yo male with PMHx of Guillain barre syndrome, chronic sinusitis, cocaine abuse, asthma, HIV presents to ED BIBA c/o acute on chronic back pain radiating down b/l legs 3 days. Pt endorses chronic LE numbness worse on the L leg. Pt also endorses difficulty ambulating x 3 days.  Pt states he has had urinary and fecal incontinence in the past including months ago and states that has occurred again for the past 3 days. Pt has a recent MRI showing no lesions, from July of this year, has a H/O urinary incontinence.  Pt has no risk factors for epidural abscess and pt has no H/O recent trauma. Will control the patient's pain with Tylenol, Lidoderm and Oxycodone, check labs to eval for anemia or electrolyte disturbance and reassess.

## 2023-08-29 VITALS
TEMPERATURE: 98 F | DIASTOLIC BLOOD PRESSURE: 68 MMHG | HEART RATE: 73 BPM | RESPIRATION RATE: 18 BRPM | OXYGEN SATURATION: 100 % | SYSTOLIC BLOOD PRESSURE: 118 MMHG

## 2023-08-29 LAB
ALBUMIN SERPL ELPH-MCNC: 4.6 G/DL — SIGNIFICANT CHANGE UP (ref 3.3–5)
ALP SERPL-CCNC: 58 U/L — SIGNIFICANT CHANGE UP (ref 40–120)
ALT FLD-CCNC: 22 U/L — SIGNIFICANT CHANGE UP (ref 4–41)
ANION GAP SERPL CALC-SCNC: 13 MMOL/L — SIGNIFICANT CHANGE UP (ref 7–14)
AST SERPL-CCNC: 29 U/L — SIGNIFICANT CHANGE UP (ref 4–40)
BASOPHILS # BLD AUTO: 0.01 K/UL — SIGNIFICANT CHANGE UP (ref 0–0.2)
BASOPHILS NFR BLD AUTO: 0.2 % — SIGNIFICANT CHANGE UP (ref 0–2)
BILIRUB SERPL-MCNC: 0.7 MG/DL — SIGNIFICANT CHANGE UP (ref 0.2–1.2)
BUN SERPL-MCNC: 18 MG/DL — SIGNIFICANT CHANGE UP (ref 7–23)
CALCIUM SERPL-MCNC: 9.6 MG/DL — SIGNIFICANT CHANGE UP (ref 8.4–10.5)
CHLORIDE SERPL-SCNC: 98 MMOL/L — SIGNIFICANT CHANGE UP (ref 98–107)
CO2 SERPL-SCNC: 26 MMOL/L — SIGNIFICANT CHANGE UP (ref 22–31)
CREAT SERPL-MCNC: 0.9 MG/DL — SIGNIFICANT CHANGE UP (ref 0.5–1.3)
CRP SERPL-MCNC: 9.2 MG/L — HIGH
EGFR: 115 ML/MIN/1.73M2 — SIGNIFICANT CHANGE UP
EOSINOPHIL # BLD AUTO: 0.04 K/UL — SIGNIFICANT CHANGE UP (ref 0–0.5)
EOSINOPHIL NFR BLD AUTO: 0.7 % — SIGNIFICANT CHANGE UP (ref 0–6)
ERYTHROCYTE [SEDIMENTATION RATE] IN BLOOD: 18 MM/HR — HIGH (ref 1–15)
GLUCOSE SERPL-MCNC: 92 MG/DL — SIGNIFICANT CHANGE UP (ref 70–99)
HCT VFR BLD CALC: 38.3 % — LOW (ref 39–50)
HGB BLD-MCNC: 12.2 G/DL — LOW (ref 13–17)
IANC: 2.62 K/UL — SIGNIFICANT CHANGE UP (ref 1.8–7.4)
IMM GRANULOCYTES NFR BLD AUTO: 0.2 % — SIGNIFICANT CHANGE UP (ref 0–0.9)
LYMPHOCYTES # BLD AUTO: 2.05 K/UL — SIGNIFICANT CHANGE UP (ref 1–3.3)
LYMPHOCYTES # BLD AUTO: 38.4 % — SIGNIFICANT CHANGE UP (ref 13–44)
MCHC RBC-ENTMCNC: 28.9 PG — SIGNIFICANT CHANGE UP (ref 27–34)
MCHC RBC-ENTMCNC: 31.9 GM/DL — LOW (ref 32–36)
MCV RBC AUTO: 90.8 FL — SIGNIFICANT CHANGE UP (ref 80–100)
MONOCYTES # BLD AUTO: 0.61 K/UL — SIGNIFICANT CHANGE UP (ref 0–0.9)
MONOCYTES NFR BLD AUTO: 11.4 % — SIGNIFICANT CHANGE UP (ref 2–14)
NEUTROPHILS # BLD AUTO: 2.62 K/UL — SIGNIFICANT CHANGE UP (ref 1.8–7.4)
NEUTROPHILS NFR BLD AUTO: 49.1 % — SIGNIFICANT CHANGE UP (ref 43–77)
NRBC # BLD: 0 /100 WBCS — SIGNIFICANT CHANGE UP (ref 0–0)
NRBC # FLD: 0 K/UL — SIGNIFICANT CHANGE UP (ref 0–0)
PLATELET # BLD AUTO: 249 K/UL — SIGNIFICANT CHANGE UP (ref 150–400)
POTASSIUM SERPL-MCNC: 4 MMOL/L — SIGNIFICANT CHANGE UP (ref 3.5–5.3)
POTASSIUM SERPL-SCNC: 4 MMOL/L — SIGNIFICANT CHANGE UP (ref 3.5–5.3)
PROT SERPL-MCNC: 8.2 G/DL — SIGNIFICANT CHANGE UP (ref 6–8.3)
RBC # BLD: 4.22 M/UL — SIGNIFICANT CHANGE UP (ref 4.2–5.8)
RBC # FLD: 13.8 % — SIGNIFICANT CHANGE UP (ref 10.3–14.5)
SODIUM SERPL-SCNC: 137 MMOL/L — SIGNIFICANT CHANGE UP (ref 135–145)
WBC # BLD: 5.34 K/UL — SIGNIFICANT CHANGE UP (ref 3.8–10.5)
WBC # FLD AUTO: 5.34 K/UL — SIGNIFICANT CHANGE UP (ref 3.8–10.5)

## 2023-08-29 RX ORDER — OXYCODONE HYDROCHLORIDE 5 MG/1
5 TABLET ORAL ONCE
Refills: 0 | Status: DISCONTINUED | OUTPATIENT
Start: 2023-08-29 | End: 2023-08-29

## 2023-08-29 RX ADMIN — OXYCODONE HYDROCHLORIDE 5 MILLIGRAM(S): 5 TABLET ORAL at 00:45

## 2023-08-29 RX ADMIN — OXYCODONE HYDROCHLORIDE 5 MILLIGRAM(S): 5 TABLET ORAL at 01:30

## 2023-08-29 RX ADMIN — OXYCODONE HYDROCHLORIDE 5 MILLIGRAM(S): 5 TABLET ORAL at 00:53

## 2023-08-29 RX ADMIN — Medication 975 MILLIGRAM(S): at 00:45

## 2023-08-29 NOTE — ED ADULT TRIAGE NOTE - PAIN RATING/NUMBER SCALE (0-10): ACTIVITY
6 Area M Indication Text: Tumors in this location are included in Area M (cheek, forehead, scalp, neck, jawline and pretibial skin).  Mohs surgery is indicated for tumors in these anatomic locations.  The size of x cm plus 2 mm margin of excision equals tumor size of at least x cm and meets criteria for Mohs surgery for this location according to CPT definition of malignant tumor size that includes margin of excision.  It also meets appropriate use criteria (AUC) for Mohs surgery.

## 2023-08-29 NOTE — CHART NOTE - NSCHARTNOTEFT_GEN_A_CORE
SW alerted that pt is requesting assistance with shelter placement. Pt known to [facility] in [place] however due to multiple hospitalizations requires new placement. ROSALINO contacted [facility] (p:[phone]) and spoke with Nicole who advised that due to pt being out of the system for an extended period of time, pt will have to present to the intake shelter in [place] for re-placement. Nicole provided address: [address] [place] [address]; p#[phone]. ROSALINO met with pt at bedside to discuss. Pt in agreement with plan. Pt requesting assistance with transportation to [place].    ROSALINO contacted [facility] (p:[phone]) and spoke with Shorty who arranged taxi pickup through [employer] (p:[phone]) trip#[id number].     No other SW needs identified at this time.

## 2023-08-29 NOTE — ED ADULT NURSE REASSESSMENT NOTE - REASSESS COMMUNICATION
Pt is resting comfortably. vs as noted. Awaits social service consult in the morning for shelter placement.

## 2023-08-29 NOTE — PROVIDER CONTACT NOTE (OTHER) - ASSESSMENT
ROSALINO met with pt at bedside-pt explained that he lost his housing placement with [facility] in [place] due to numerous hospitalizations.  States he is interested in going to [facility] to be placed again in housing in the community.  He reports his office is the [facility] office at [phone].  ROSALINO called above number-office is closed until 8:00 a.m. and the emergency housing assistance prompt is not available.

## 2023-09-03 ENCOUNTER — INPATIENT (INPATIENT)
Facility: HOSPITAL | Age: 33
LOS: 1 days | Discharge: LEFT BEFORE DISCHARGE | End: 2023-09-05
Attending: INTERNAL MEDICINE | Admitting: INTERNAL MEDICINE
Payer: MEDICAID

## 2023-09-03 VITALS
DIASTOLIC BLOOD PRESSURE: 102 MMHG | OXYGEN SATURATION: 99 % | WEIGHT: 197.09 LBS | TEMPERATURE: 98 F | RESPIRATION RATE: 16 BRPM | HEIGHT: 72 IN | SYSTOLIC BLOOD PRESSURE: 144 MMHG | HEART RATE: 72 BPM

## 2023-09-03 DIAGNOSIS — B20 HUMAN IMMUNODEFICIENCY VIRUS [HIV] DISEASE: ICD-10-CM

## 2023-09-03 DIAGNOSIS — Z98.890 OTHER SPECIFIED POSTPROCEDURAL STATES: Chronic | ICD-10-CM

## 2023-09-03 DIAGNOSIS — R53.1 WEAKNESS: ICD-10-CM

## 2023-09-03 DIAGNOSIS — G61.0 GUILLAIN-BARRE SYNDROME: ICD-10-CM

## 2023-09-03 LAB
ALBUMIN SERPL ELPH-MCNC: 4.1 G/DL — SIGNIFICANT CHANGE UP (ref 3.3–5)
ALP SERPL-CCNC: 61 U/L — SIGNIFICANT CHANGE UP (ref 40–120)
ALT FLD-CCNC: 26 U/L — SIGNIFICANT CHANGE UP (ref 12–78)
ANION GAP SERPL CALC-SCNC: 8 MMOL/L — SIGNIFICANT CHANGE UP (ref 5–17)
APPEARANCE UR: CLEAR — SIGNIFICANT CHANGE UP
AST SERPL-CCNC: 37 U/L — SIGNIFICANT CHANGE UP (ref 15–37)
BACTERIA # UR AUTO: NEGATIVE — SIGNIFICANT CHANGE UP
BASOPHILS # BLD AUTO: 0.03 K/UL — SIGNIFICANT CHANGE UP (ref 0–0.2)
BASOPHILS NFR BLD AUTO: 0.4 % — SIGNIFICANT CHANGE UP (ref 0–2)
BILIRUB SERPL-MCNC: 0.7 MG/DL — SIGNIFICANT CHANGE UP (ref 0.2–1.2)
BILIRUB UR-MCNC: NEGATIVE — SIGNIFICANT CHANGE UP
BUN SERPL-MCNC: 18 MG/DL — SIGNIFICANT CHANGE UP (ref 7–23)
CALCIUM SERPL-MCNC: 9 MG/DL — SIGNIFICANT CHANGE UP (ref 8.5–10.1)
CHLORIDE SERPL-SCNC: 105 MMOL/L — SIGNIFICANT CHANGE UP (ref 96–108)
CO2 SERPL-SCNC: 24 MMOL/L — SIGNIFICANT CHANGE UP (ref 22–31)
COLOR SPEC: YELLOW — SIGNIFICANT CHANGE UP
CREAT SERPL-MCNC: 0.99 MG/DL — SIGNIFICANT CHANGE UP (ref 0.5–1.3)
DIFF PNL FLD: NEGATIVE — SIGNIFICANT CHANGE UP
EGFR: 103 ML/MIN/1.73M2 — SIGNIFICANT CHANGE UP
EOSINOPHIL # BLD AUTO: 0.04 K/UL — SIGNIFICANT CHANGE UP (ref 0–0.5)
EOSINOPHIL NFR BLD AUTO: 0.6 % — SIGNIFICANT CHANGE UP (ref 0–6)
EPI CELLS # UR: SIGNIFICANT CHANGE UP
ERYTHROCYTE [SEDIMENTATION RATE] IN BLOOD: 14 MM/HR — SIGNIFICANT CHANGE UP (ref 0–15)
GLUCOSE SERPL-MCNC: 76 MG/DL — SIGNIFICANT CHANGE UP (ref 70–99)
GLUCOSE UR QL: NEGATIVE MG/DL — SIGNIFICANT CHANGE UP
GRAN CASTS # UR COMP ASSIST: ABNORMAL /LPF
HCT VFR BLD CALC: 41.5 % — SIGNIFICANT CHANGE UP (ref 39–50)
HGB BLD-MCNC: 13.2 G/DL — SIGNIFICANT CHANGE UP (ref 13–17)
IMM GRANULOCYTES NFR BLD AUTO: 0.3 % — SIGNIFICANT CHANGE UP (ref 0–0.9)
KETONES UR-MCNC: ABNORMAL
LEUKOCYTE ESTERASE UR-ACNC: NEGATIVE — SIGNIFICANT CHANGE UP
LYMPHOCYTES # BLD AUTO: 2.84 K/UL — SIGNIFICANT CHANGE UP (ref 1–3.3)
LYMPHOCYTES # BLD AUTO: 42.3 % — SIGNIFICANT CHANGE UP (ref 13–44)
MAGNESIUM SERPL-MCNC: 1.8 MG/DL — SIGNIFICANT CHANGE UP (ref 1.6–2.6)
MCHC RBC-ENTMCNC: 28.4 PG — SIGNIFICANT CHANGE UP (ref 27–34)
MCHC RBC-ENTMCNC: 31.8 G/DL — LOW (ref 32–36)
MCV RBC AUTO: 89.2 FL — SIGNIFICANT CHANGE UP (ref 80–100)
MONOCYTES # BLD AUTO: 0.69 K/UL — SIGNIFICANT CHANGE UP (ref 0–0.9)
MONOCYTES NFR BLD AUTO: 10.3 % — SIGNIFICANT CHANGE UP (ref 2–14)
NEUTROPHILS # BLD AUTO: 3.1 K/UL — SIGNIFICANT CHANGE UP (ref 1.8–7.4)
NEUTROPHILS NFR BLD AUTO: 46.1 % — SIGNIFICANT CHANGE UP (ref 43–77)
NITRITE UR-MCNC: NEGATIVE — SIGNIFICANT CHANGE UP
NRBC # BLD: 0 /100 WBCS — SIGNIFICANT CHANGE UP (ref 0–0)
NT-PROBNP SERPL-SCNC: 6 PG/ML — SIGNIFICANT CHANGE UP (ref 0–125)
PH UR: 5 — SIGNIFICANT CHANGE UP (ref 5–8)
PLATELET # BLD AUTO: 246 K/UL — SIGNIFICANT CHANGE UP (ref 150–400)
POTASSIUM SERPL-MCNC: 4.2 MMOL/L — SIGNIFICANT CHANGE UP (ref 3.5–5.3)
POTASSIUM SERPL-SCNC: 4.2 MMOL/L — SIGNIFICANT CHANGE UP (ref 3.5–5.3)
PROT SERPL-MCNC: 8.7 GM/DL — HIGH (ref 6–8.3)
PROT UR-MCNC: 30 MG/DL
RBC # BLD: 4.65 M/UL — SIGNIFICANT CHANGE UP (ref 4.2–5.8)
RBC # FLD: 13.6 % — SIGNIFICANT CHANGE UP (ref 10.3–14.5)
RBC CASTS # UR COMP ASSIST: NEGATIVE /HPF — SIGNIFICANT CHANGE UP (ref 0–4)
SODIUM SERPL-SCNC: 137 MMOL/L — SIGNIFICANT CHANGE UP (ref 135–145)
SP GR SPEC: 1.03 — HIGH (ref 1.01–1.02)
TROPONIN I, HIGH SENSITIVITY RESULT: 3.5 NG/L — SIGNIFICANT CHANGE UP
TROPONIN I, HIGH SENSITIVITY RESULT: 4.2 NG/L — SIGNIFICANT CHANGE UP
UROBILINOGEN FLD QL: NEGATIVE MG/DL — SIGNIFICANT CHANGE UP
WBC # BLD: 6.72 K/UL — SIGNIFICANT CHANGE UP (ref 3.8–10.5)
WBC # FLD AUTO: 6.72 K/UL — SIGNIFICANT CHANGE UP (ref 3.8–10.5)
WBC UR QL: SIGNIFICANT CHANGE UP

## 2023-09-03 PROCEDURE — 93010 ELECTROCARDIOGRAM REPORT: CPT

## 2023-09-03 PROCEDURE — 71045 X-RAY EXAM CHEST 1 VIEW: CPT | Mod: 26

## 2023-09-03 PROCEDURE — 99285 EMERGENCY DEPT VISIT HI MDM: CPT

## 2023-09-03 PROCEDURE — 99053 MED SERV 10PM-8AM 24 HR FAC: CPT

## 2023-09-03 RX ORDER — ONDANSETRON 8 MG/1
4 TABLET, FILM COATED ORAL EVERY 8 HOURS
Refills: 0 | Status: DISCONTINUED | OUTPATIENT
Start: 2023-09-03 | End: 2023-09-05

## 2023-09-03 RX ORDER — ACETAMINOPHEN 500 MG
1000 TABLET ORAL ONCE
Refills: 0 | Status: COMPLETED | OUTPATIENT
Start: 2023-09-03 | End: 2023-09-03

## 2023-09-03 RX ORDER — LIDOCAINE 4 G/100G
1 CREAM TOPICAL ONCE
Refills: 0 | Status: COMPLETED | OUTPATIENT
Start: 2023-09-03 | End: 2023-09-03

## 2023-09-03 RX ORDER — ENOXAPARIN SODIUM 100 MG/ML
40 INJECTION SUBCUTANEOUS EVERY 24 HOURS
Refills: 0 | Status: DISCONTINUED | OUTPATIENT
Start: 2023-09-03 | End: 2023-09-05

## 2023-09-03 RX ORDER — LANOLIN ALCOHOL/MO/W.PET/CERES
3 CREAM (GRAM) TOPICAL AT BEDTIME
Refills: 0 | Status: DISCONTINUED | OUTPATIENT
Start: 2023-09-03 | End: 2023-09-05

## 2023-09-03 RX ORDER — PANTOPRAZOLE SODIUM 20 MG/1
40 TABLET, DELAYED RELEASE ORAL
Refills: 0 | Status: DISCONTINUED | OUTPATIENT
Start: 2023-09-03 | End: 2023-09-05

## 2023-09-03 RX ORDER — BICTEGRAVIR SODIUM, EMTRICITABINE, AND TENOFOVIR ALAFENAMIDE FUMARATE 30; 120; 15 MG/1; MG/1; MG/1
1 TABLET ORAL DAILY
Refills: 0 | Status: DISCONTINUED | OUTPATIENT
Start: 2023-09-03 | End: 2023-09-05

## 2023-09-03 RX ORDER — INFLUENZA VIRUS VACCINE 15; 15; 15; 15 UG/.5ML; UG/.5ML; UG/.5ML; UG/.5ML
0.5 SUSPENSION INTRAMUSCULAR ONCE
Refills: 0 | Status: DISCONTINUED | OUTPATIENT
Start: 2023-09-03 | End: 2023-09-05

## 2023-09-03 RX ORDER — SODIUM CHLORIDE 9 MG/ML
1000 INJECTION INTRAMUSCULAR; INTRAVENOUS; SUBCUTANEOUS ONCE
Refills: 0 | Status: COMPLETED | OUTPATIENT
Start: 2023-09-03 | End: 2023-09-03

## 2023-09-03 RX ORDER — ACETAMINOPHEN 500 MG
650 TABLET ORAL EVERY 6 HOURS
Refills: 0 | Status: DISCONTINUED | OUTPATIENT
Start: 2023-09-03 | End: 2023-09-05

## 2023-09-03 RX ADMIN — Medication 3 MILLIGRAM(S): at 22:19

## 2023-09-03 RX ADMIN — BICTEGRAVIR SODIUM, EMTRICITABINE, AND TENOFOVIR ALAFENAMIDE FUMARATE 1 TABLET(S): 30; 120; 15 TABLET ORAL at 14:10

## 2023-09-03 RX ADMIN — Medication 400 MILLIGRAM(S): at 04:25

## 2023-09-03 RX ADMIN — ENOXAPARIN SODIUM 40 MILLIGRAM(S): 100 INJECTION SUBCUTANEOUS at 17:31

## 2023-09-03 RX ADMIN — LIDOCAINE 1 PATCH: 4 CREAM TOPICAL at 04:25

## 2023-09-03 RX ADMIN — SODIUM CHLORIDE 1000 MILLILITER(S): 9 INJECTION INTRAMUSCULAR; INTRAVENOUS; SUBCUTANEOUS at 04:30

## 2023-09-03 NOTE — ED PROVIDER NOTE - INPATIENT RECORD SUMMARY
prior notes reviewed, pt with recurrent similar complaints, last mri July 2023 thoracic and lumbar area - no pathology identified

## 2023-09-03 NOTE — PHYSICAL THERAPY INITIAL EVALUATION ADULT - ADDITIONAL COMMENTS
As per pt, lives in an apt in the 1st floor c roommates, no entry step, and 1 level inside apt. Independent c ADL's and household ambulation c cane and community ambulation c rolling walker.

## 2023-09-03 NOTE — H&P ADULT - NSHPLABSRESULTS_GEN_ALL_CORE
LABS:                        13.2   6.72  )-----------( 246      ( 03 Sep 2023 04:54 )             41.5     -    137  |  105  |  18  ----------------------------<  76  4.2   |  24  |  0.99    Ca    9.0      03 Sep 2023 04:10  Mg     1.8     -    TPro  8.7<H>  /  Alb  4.1  /  TBili  0.7  /  DBili  x   /  AST  37  /  ALT  26  /  AlkPhos  61  -      Urinalysis Basic - ( 03 Sep 2023 07:20 )    Color: Yellow / Appearance: Clear / S.030 / pH: x  Gluc: x / Ketone: Moderate  / Bili: Negative / Urobili: Negative mg/dL   Blood: x / Protein: 30 mg/dL / Nitrite: Negative   Leuk Esterase: Negative / RBC: Negative /HPF / WBC 3-5   Sq Epi: x / Non Sq Epi: x / Bacteria: Negative      CAPILLARY BLOOD GLUCOSE      Troponin I, High Sensitivity Result: 4.2: Serial measurements of high sensitivity troponin I (hs TnI) showing rapid       < from: TTE Echo Complete w/o Contrast w/ Doppler (21 @ 12:22) >     Impression     Summary     The mitral valve leaflets are well seen thin and pliable; preserved   leaflet excursion noted. Trace mitral valve regurgitation noted.   The aortic valve leaflets are well seen with normal valve morphology;   preserved leaflet excursion noted. No aortic valve insufficiency noted.   The tricuspid valve leaflets are well seen and appear thin and pliable   with preserved leaflets excursion. Mild tricuspid regurgitation noted.   Normal appearing left atrium.   Left ventricular wall motion is normal.   The left ventricle is normal in size.   Estimated left ventricular ejection fraction is 60 %.   The right atrium is normal.   Normal appearing right ventricle structure and function.    < end of copied text >

## 2023-09-03 NOTE — ED PROVIDER NOTE - NSICDXFAMILYHX_GEN_ALL_CORE_FT
unknown FAMILY HISTORY:  Mother  Still living? Unknown  FH: HIV infection, Age at diagnosis: Age Unknown

## 2023-09-03 NOTE — H&P ADULT - NSICDXPASTMEDICALHX_GEN_ALL_CORE_FT
PAST MEDICAL HISTORY:  Asthma     Chronic sinusitis     Closed fracture of multiple ribs of right side, initial encounter     Cocaine abuse     GBS (Guillain [place] syndrome)     HIV (human immunodeficiency virus infection) from birth    HIV disease     Homeless

## 2023-09-03 NOTE — ED PROVIDER NOTE - NSICDXPASTMEDICALHX_GEN_ALL_CORE_FT
PAST MEDICAL HISTORY:  Asthma     Chronic sinusitis     Closed fracture of multiple ribs of right side, initial encounter     Cocaine abuse     GBS (Guillain [place] syndrome)     HIV (human immunodeficiency virus infection) from birth    Homeless      PAST MEDICAL HISTORY:  Asthma     Chronic sinusitis     Closed fracture of multiple ribs of right side, initial encounter     Cocaine abuse     GBS (Guillain [place] syndrome)     HIV (human immunodeficiency virus infection) from birth    HIV disease     Homeless

## 2023-09-03 NOTE — PATIENT PROFILE ADULT - FALL HARM RISK - HARM RISK INTERVENTIONS

## 2023-09-03 NOTE — ED ADULT NURSE NOTE - NSFALLUNIVINTERV_ED_ALL_ED
Bed/Stretcher in lowest position, wheels locked, appropriate side rails in place/Call bell, personal items and telephone in reach/Instruct patient to call for assistance before getting out of bed/chair/stretcher/Non-slip footwear applied when patient is off stretcher/[place] to call system/Physically safe environment - no spills, clutter or unnecessary equipment/Purposeful proactive rounding/Room/bathroom lighting operational, light cord in reach

## 2023-09-03 NOTE — ED PROVIDER NOTE - PROGRESS NOTE DETAILS
Pt was seen and treated by Dr. Conrad c/o pain all over his body needs physical therapy eval and . Pt appears very comfortable. Pt was seen and treated by Dr. Conrad c/o pain all over his body needs physical therapy eval and . Pt appears very comfortable. tropo x 2 normal. PT eval and recommends for in pt rehab due to generalized weakness. Pt is sitting up eating and tolerating lunch. denies headache, dizziness, neck/back/hips pain, cough, sob, chest pain, nausea, vomiting, abd pain. Dr. Hurd is notified and admit pt.

## 2023-09-03 NOTE — ED PROVIDER NOTE - CLINICAL SUMMARY MEDICAL DECISION MAKING FREE TEXT BOX
34 yo male with PMHx of Guillain barre syndrome, chronic sinusitis, cocaine abuse, asthma, HIV presents to ED BIBA c/o chest pain described as tightness sensation. He states ongoing intermittently this evening. No clear provoking/ alleviating factors. No sob or associated upper back pain or fevers. Patient notes additionally generalized weakness ongoing x 1 month with associated increased falls. Patient reports occasional urinary incontinence x 1 month. He denies numbness. He notes chronic low back pain  - PERC 0, no LE swelling, no sob, making PE unlikely  - pt reports compliance with biktarvy HIV medication  - no infectious symptoms  - no sensory deficits , strength b/l LE mildly diminished but no features suggestive of cord compression   - chart review with similar prior presentations  - will obtain serial cardiac enzymes, cxr, analgesia, PT eval in AM with possibly dc home, if not improving may consider admission but multiple similar presentations

## 2023-09-03 NOTE — H&P ADULT - HISTORY OF PRESENT ILLNESS
34yo, BM with h/o Guillain barre syndrome x 6 yrs,  cocaine abuse, asthma, HIV presents to ED BIBA c/o chest pain described as tightness sensation. He states ongoing intermittently this evening. No clear provoking/ alleviating factors. No sob or associated upper back pain or fevers. Patient notes additionally generalized weakness ongoing x 1 month with associated increased falls. Patient reports occasional urinary incontinence x 1 month. He denies numbness. He notes chronic low back pain.  Denies,PND, cough, palpitation, n/v/d, fever, chills,  abdominal pain ,dysuria ,HA, dizziness.

## 2023-09-03 NOTE — PHYSICAL THERAPY INITIAL EVALUATION ADULT - PERTINENT HX OF CURRENT PROBLEM, REHAB EVAL
Patient is a 32 y/o male seen at the ED due to chest pain. Pt has PMHx of Guillain barre syndrome, chronic sinusitis, cocaine abuse, asthma, HIV presents to ED BIBA c/o chest pain described as tightness sensation. Patient notes additionally generalized weakness ongoing x 1 month with associated increased falls. Patient reports occasional urinary incontinence x 1 month.

## 2023-09-03 NOTE — ED ADULT NURSE NOTE - NSICDXPASTMEDICALHX_GEN_ALL_CORE_FT
PAST MEDICAL HISTORY:  Asthma     Chronic sinusitis     Closed fracture of multiple ribs of right side, initial encounter     Cocaine abuse     GBS (Guillain [place] syndrome)     HIV (human immunodeficiency virus infection) from birth    Homeless

## 2023-09-03 NOTE — ED ADULT NURSE NOTE - OBJECTIVE STATEMENT
Pt BIBA co of LL chest pain. Pt states that he also has pain in his lower back that is tender to touch and severe weakness for 2 months. Pt denies SOB, fevers. +NVD. EMS gave 324mg ASA. PMH HIV. Pt allergic to Toradol. Placed patient on cardiac monitor.

## 2023-09-03 NOTE — ED PROVIDER NOTE - OBJECTIVE STATEMENT
32 yo male with PMHx of Guillain barre syndrome, chronic sinusitis, cocaine abuse, asthma, HIV presents to ED BIBA c/o chest pain described as tightness sensation. He states ongoing intermittently this evening. No clear provoking/ alleviating factors. No sob or associated upper back pain or fevers. Patient notes additionally generalized weakness ongoing x 1 month with associated increased falls. Patient reports occasional urinary incontinence x 1 month. He denies numbness. He notes chronic low back pain

## 2023-09-03 NOTE — H&P ADULT - NSHPPHYSICALEXAM_GEN_ALL_CORE
PHYSICAL EXAM:  GENERAL: NAD, well-groomed, well-developed  HEAD:  Atraumatic, Normocephalic  EYES: EOMI, PERRLA, conjunctiva and sclera clear  ENMT: Intact  NECK: Supple, No JVD, Normal thyroid  NERVOUS SYSTEM:  Alert & Oriented X3; Motor Strength 5/5   CHEST/LUNG: Clear bilaterally; No rales, rhonchi, wheezing, or rubs  HEART: Regular rate and rhythm; No murmurs, rubs, or gallops  ABDOMEN: Soft, Nontender, Nondistended; Bowel sounds present  EXTREMITIES:  No clubbing, cyanosis, or edema  SKIN: No rashes or lesions

## 2023-09-03 NOTE — ED ADULT NURSE REASSESSMENT NOTE - NS ED NURSE REASSESS COMMENT FT1
PT seen by Physical therapy at appx 1145am. No complaints of pain after medication administration. PT awaiting admission.

## 2023-09-03 NOTE — ED PROVIDER NOTE - PHYSICAL EXAMINATION
Gen: AOX3, NAD  Head: NCAT  ENT: Airway patent, moist mucous membranes, nasal passageways clear   Cardiac: Normal rate, normal rhythm, no murmurs   Respiratory: Lungs CTA B/L  Gastrointestinal: Abdomen soft, nontender, nondistended, no rebound, no guarding  MSK: No gross abnormalities, FROM of all four extremities, no edema,  HEME: Extremities warm, pulses intact and symmetrical in all four extremities  Skin: No rashes, no lesions  Neuro: No gross neurologic deficits, no sensory deficits, strength 5/5 in all four extremities but decreased b/l LE, no numbness or sensory deficits , unable to assess gait

## 2023-09-04 LAB
ALBUMIN SERPL ELPH-MCNC: 3.2 G/DL — LOW (ref 3.3–5)
ALP SERPL-CCNC: 55 U/L — SIGNIFICANT CHANGE UP (ref 40–120)
ALT FLD-CCNC: 20 U/L — SIGNIFICANT CHANGE UP (ref 12–78)
ANION GAP SERPL CALC-SCNC: 4 MMOL/L — LOW (ref 5–17)
AST SERPL-CCNC: 20 U/L — SIGNIFICANT CHANGE UP (ref 15–37)
BILIRUB SERPL-MCNC: 0.3 MG/DL — SIGNIFICANT CHANGE UP (ref 0.2–1.2)
BUN SERPL-MCNC: 12 MG/DL — SIGNIFICANT CHANGE UP (ref 7–23)
CALCIUM SERPL-MCNC: 8.2 MG/DL — LOW (ref 8.5–10.1)
CHLORIDE SERPL-SCNC: 108 MMOL/L — SIGNIFICANT CHANGE UP (ref 96–108)
CO2 SERPL-SCNC: 27 MMOL/L — SIGNIFICANT CHANGE UP (ref 22–31)
CREAT SERPL-MCNC: 0.8 MG/DL — SIGNIFICANT CHANGE UP (ref 0.5–1.3)
CRP SERPL-MCNC: 5 MG/L — HIGH
CULTURE RESULTS: SIGNIFICANT CHANGE UP
EGFR: 119 ML/MIN/1.73M2 — SIGNIFICANT CHANGE UP
GLUCOSE SERPL-MCNC: 85 MG/DL — SIGNIFICANT CHANGE UP (ref 70–99)
HCT VFR BLD CALC: 37.8 % — LOW (ref 39–50)
HGB BLD-MCNC: 12.3 G/DL — LOW (ref 13–17)
MCHC RBC-ENTMCNC: 28.8 PG — SIGNIFICANT CHANGE UP (ref 27–34)
MCHC RBC-ENTMCNC: 32.5 G/DL — SIGNIFICANT CHANGE UP (ref 32–36)
MCV RBC AUTO: 88.5 FL — SIGNIFICANT CHANGE UP (ref 80–100)
NRBC # BLD: 0 /100 WBCS — SIGNIFICANT CHANGE UP (ref 0–0)
PLATELET # BLD AUTO: 251 K/UL — SIGNIFICANT CHANGE UP (ref 150–400)
POTASSIUM SERPL-MCNC: 3.3 MMOL/L — LOW (ref 3.5–5.3)
POTASSIUM SERPL-SCNC: 3.3 MMOL/L — LOW (ref 3.5–5.3)
PROT SERPL-MCNC: 7 GM/DL — SIGNIFICANT CHANGE UP (ref 6–8.3)
RBC # BLD: 4.27 M/UL — SIGNIFICANT CHANGE UP (ref 4.2–5.8)
RBC # FLD: 13.2 % — SIGNIFICANT CHANGE UP (ref 10.3–14.5)
SODIUM SERPL-SCNC: 139 MMOL/L — SIGNIFICANT CHANGE UP (ref 135–145)
SPECIMEN SOURCE: SIGNIFICANT CHANGE UP
WBC # BLD: 3.37 K/UL — LOW (ref 3.8–10.5)
WBC # FLD AUTO: 3.37 K/UL — LOW (ref 3.8–10.5)

## 2023-09-04 RX ORDER — QUETIAPINE FUMARATE 200 MG/1
200 TABLET, FILM COATED ORAL AT BEDTIME
Refills: 0 | Status: DISCONTINUED | OUTPATIENT
Start: 2023-09-04 | End: 2023-09-05

## 2023-09-04 RX ORDER — QUETIAPINE FUMARATE 200 MG/1
100 TABLET, FILM COATED ORAL DAILY
Refills: 0 | Status: DISCONTINUED | OUTPATIENT
Start: 2023-09-04 | End: 2023-09-05

## 2023-09-04 RX ORDER — BACLOFEN 100 %
5 POWDER (GRAM) MISCELLANEOUS EVERY 8 HOURS
Refills: 0 | Status: DISCONTINUED | OUTPATIENT
Start: 2023-09-04 | End: 2023-09-05

## 2023-09-04 RX ORDER — GABAPENTIN 400 MG/1
600 CAPSULE ORAL
Refills: 0 | Status: DISCONTINUED | OUTPATIENT
Start: 2023-09-04 | End: 2023-09-05

## 2023-09-04 RX ORDER — POTASSIUM CHLORIDE 20 MEQ
40 PACKET (EA) ORAL EVERY 4 HOURS
Refills: 0 | Status: COMPLETED | OUTPATIENT
Start: 2023-09-04 | End: 2023-09-04

## 2023-09-04 RX ADMIN — QUETIAPINE FUMARATE 200 MILLIGRAM(S): 200 TABLET, FILM COATED ORAL at 21:25

## 2023-09-04 RX ADMIN — ENOXAPARIN SODIUM 40 MILLIGRAM(S): 100 INJECTION SUBCUTANEOUS at 17:39

## 2023-09-04 RX ADMIN — GABAPENTIN 600 MILLIGRAM(S): 400 CAPSULE ORAL at 17:40

## 2023-09-04 RX ADMIN — Medication 3 MILLIGRAM(S): at 21:25

## 2023-09-04 RX ADMIN — Medication 40 MILLIEQUIVALENT(S): at 11:36

## 2023-09-04 RX ADMIN — PANTOPRAZOLE SODIUM 40 MILLIGRAM(S): 20 TABLET, DELAYED RELEASE ORAL at 08:52

## 2023-09-04 RX ADMIN — Medication 40 MILLIEQUIVALENT(S): at 13:53

## 2023-09-04 RX ADMIN — QUETIAPINE FUMARATE 100 MILLIGRAM(S): 200 TABLET, FILM COATED ORAL at 13:52

## 2023-09-04 RX ADMIN — BICTEGRAVIR SODIUM, EMTRICITABINE, AND TENOFOVIR ALAFENAMIDE FUMARATE 1 TABLET(S): 30; 120; 15 TABLET ORAL at 11:33

## 2023-09-04 RX ADMIN — Medication 1 TABLET(S): at 08:57

## 2023-09-04 RX ADMIN — GABAPENTIN 600 MILLIGRAM(S): 400 CAPSULE ORAL at 13:52

## 2023-09-04 RX ADMIN — GABAPENTIN 600 MILLIGRAM(S): 400 CAPSULE ORAL at 23:14

## 2023-09-04 NOTE — PROGRESS NOTE ADULT - SUBJECTIVE AND OBJECTIVE BOX
Patient is a 33y old  Male who presents with a chief complaint of Body aches and chest pain (03 Sep 2023 15:14)      SUBJECTIVE/OBJECTIVE:    c/o muscle pain    MEDICATIONS  (STANDING):  bictegravir 50 mG/emtricitabine 200 mG/tenofovir alafenamide 25 mG (BIKTARVY) 1 Tablet(s) Oral daily  enoxaparin Injectable 40 milliGRAM(s) SubCutaneous every 24 hours  gabapentin 600 milliGRAM(s) Oral four times a day  influenza   Vaccine 0.5 milliLiter(s) IntraMuscular once  pantoprazole    Tablet 40 milliGRAM(s) Oral before breakfast  potassium chloride    Tablet ER 40 milliEquivalent(s) Oral every 4 hours  QUEtiapine 200 milliGRAM(s) Oral at bedtime  QUEtiapine 100 milliGRAM(s) Oral daily  trimethoprim  160 mG/sulfamethoxazole 800 mG 1 Tablet(s) Oral <User Schedule>    MEDICATIONS  (PRN):  acetaminophen     Tablet .. 650 milliGRAM(s) Oral every 6 hours PRN Temp greater or equal to 38C (100.4F), Mild Pain (1 - 3)  aluminum hydroxide/magnesium hydroxide/simethicone Suspension 30 milliLiter(s) Oral every 4 hours PRN Dyspepsia  baclofen 5 milliGRAM(s) Oral every 8 hours PRN Musculoskeletal Pain  melatonin 3 milliGRAM(s) Oral at bedtime PRN Insomnia  ondansetron Injectable 4 milliGRAM(s) IV Push every 8 hours PRN Nausea and/or Vomiting  oxycodone    5 mG/acetaminophen 325 mG 2 Tablet(s) Oral every 4 hours PRN Moderate Pain (4 - 6)      Allergies    Toradol (Swelling)  Toradol (Anaphylaxis; Swelling)  Ceclor (Unknown)    Intolerances          Vital Signs Last 24 Hrs  T(C): 36.6 (04 Sep 2023 10:51), Max: 36.9 (03 Sep 2023 15:59)  T(F): 97.8 (04 Sep 2023 10:51), Max: 98.5 (03 Sep 2023 15:59)  HR: 93 (04 Sep 2023 10:51) (61 - 93)  BP: 127/80 (04 Sep 2023 10:51) (112/75 - 127/80)  BP(mean): --  RR: 18 (04 Sep 2023 10:51) (17 - 18)  SpO2: 99% (04 Sep 2023 10:51) (97% - 100%)    Parameters below as of 04 Sep 2023 10:51  Patient On (Oxygen Delivery Method): room air        PHYSICAL EXAM:  GENERAL: NAD, well-groomed, well-developed  HEAD:  Atraumatic, Normocephalic  EYES: EOMI, PERRLA, conjunctiva and sclera clear  ENMT: Moist mucous membranes, No lesions  NECK: Supple, No JVD, Normal thyroid  NERVOUS SYSTEM:  Alert & Oriented X3; Motor Strength 4/5  CHEST/LUNG: Clear bilaterally; No rales, rhonchi, wheezing, or rubs  HEART: Regular rate and rhythm; No murmurs, rubs, or gallops  ABDOMEN: Soft, Nontender, Nondistended; Bowel sounds present  Extremities;  No clubbing, cyanosis, or edema  SKIN: No rashes or lesions    LABS:                        12.3   3.37  )-----------( 251      ( 04 Sep 2023 06:20 )             37.8     09-04    139  |  108  |  12  ----------------------------<  85  3.3<L>   |  27  |  0.80    Ca    8.2<L>      04 Sep 2023 06:20  Mg     1.8     09-03    TPro  7.0  /  Alb  3.2<L>  /  TBili  0.3  /  DBili  x   /  AST  20  /  ALT  20  /  AlkPhos  55  09-04      Urinalysis Basic - ( 04 Sep 2023 06:20 )    Color: x / Appearance: x / SG: x / pH: x  Gluc: 85 mg/dL / Ketone: x  / Bili: x / Urobili: x   Blood: x / Protein: x / Nitrite: x   Leuk Esterase: x / RBC: x / WBC x   Sq Epi: x / Non Sq Epi: x / Bacteria: x      CAPILLARY BLOOD GLUCOSE              RADIOLOGY & ADDITIONAL TESTS:    < from: Xray Chest 1 View- PORTABLE-Urgent (09.03.23 @ 06:08) >  INTERPRETATION:  AP chest on September 3, 2023 at 5:21 AM. Patient has   chest pain.    Heart magnified by technique.    Lungs areclear.    Chest is similar to July 25, 2023.    IMPRESSION: No acute finding or change.    --- End of Report ---    < end of copied text >      Consultant(s) Notes Reviewed:  [ ] YES  [ ] NO

## 2023-09-04 NOTE — ED PROCEDURE NOTE - NS_EDPROVIDERDISPOUSERTYPE_ED_A_ED
Patient walked on pulse ox. HR stayed below 100 and O2 sat was between 91-95%, gait steady and smooth. Patient denies any worsening SOB, states she feels a lot better than when she came in.  The only issue she is having is taking deep breaths b/c of the pain in her rib cage     Kodi Ferguson RN  09/04/23 [id number] Attending Attestation (For Attendings USE Only)...

## 2023-09-05 VITALS — OXYGEN SATURATION: 99 % | HEART RATE: 83 BPM | SYSTOLIC BLOOD PRESSURE: 110 MMHG | DIASTOLIC BLOOD PRESSURE: 74 MMHG

## 2023-09-05 LAB
ANION GAP SERPL CALC-SCNC: 4 MMOL/L — LOW (ref 5–17)
BUN SERPL-MCNC: 13 MG/DL — SIGNIFICANT CHANGE UP (ref 7–23)
CALCIUM SERPL-MCNC: 8.5 MG/DL — SIGNIFICANT CHANGE UP (ref 8.5–10.1)
CHLORIDE SERPL-SCNC: 110 MMOL/L — HIGH (ref 96–108)
CO2 SERPL-SCNC: 26 MMOL/L — SIGNIFICANT CHANGE UP (ref 22–31)
CREAT SERPL-MCNC: 0.92 MG/DL — SIGNIFICANT CHANGE UP (ref 0.5–1.3)
EGFR: 112 ML/MIN/1.73M2 — SIGNIFICANT CHANGE UP
GLUCOSE SERPL-MCNC: 105 MG/DL — HIGH (ref 70–99)
HCT VFR BLD CALC: 37.7 % — LOW (ref 39–50)
HGB BLD-MCNC: 12.6 G/DL — LOW (ref 13–17)
MCHC RBC-ENTMCNC: 29.7 PG — SIGNIFICANT CHANGE UP (ref 27–34)
MCHC RBC-ENTMCNC: 33.4 G/DL — SIGNIFICANT CHANGE UP (ref 32–36)
MCV RBC AUTO: 88.9 FL — SIGNIFICANT CHANGE UP (ref 80–100)
NRBC # BLD: 0 /100 WBCS — SIGNIFICANT CHANGE UP (ref 0–0)
PLATELET # BLD AUTO: 239 K/UL — SIGNIFICANT CHANGE UP (ref 150–400)
POTASSIUM SERPL-MCNC: 3.7 MMOL/L — SIGNIFICANT CHANGE UP (ref 3.5–5.3)
POTASSIUM SERPL-SCNC: 3.7 MMOL/L — SIGNIFICANT CHANGE UP (ref 3.5–5.3)
RBC # BLD: 4.24 M/UL — SIGNIFICANT CHANGE UP (ref 4.2–5.8)
RBC # FLD: 13.4 % — SIGNIFICANT CHANGE UP (ref 10.3–14.5)
SODIUM SERPL-SCNC: 140 MMOL/L — SIGNIFICANT CHANGE UP (ref 135–145)
WBC # BLD: 3.19 K/UL — LOW (ref 3.8–10.5)
WBC # FLD AUTO: 3.19 K/UL — LOW (ref 3.8–10.5)

## 2023-09-05 RX ADMIN — PANTOPRAZOLE SODIUM 40 MILLIGRAM(S): 20 TABLET, DELAYED RELEASE ORAL at 08:17

## 2023-09-05 RX ADMIN — GABAPENTIN 600 MILLIGRAM(S): 400 CAPSULE ORAL at 05:20

## 2023-09-05 NOTE — OCCUPATIONAL THERAPY INITIAL EVALUATION ADULT - LIVES WITH, PROFILE
witgh a room on the first level with elevator access and no steps to negotiate. All living amenities are located on one level. The bathroom has a walk in shower, grab brs , fixed / retractable shower head  and standard toilet.

## 2023-09-05 NOTE — OCCUPATIONAL THERAPY INITIAL EVALUATION ADULT - TRANSFER TRAINING, PT EVAL
Pt will transfer to all surfaces, safely and independently with the least restrictive adaptive device within 2 -4 weeks.

## 2023-09-05 NOTE — CHART NOTE - NSCHARTNOTEFT_GEN_A_CORE
Hospitalist Medicine NP    Patient is a 33y old Male admitted for generalized weakness. Patient walked out of the unit and refused to sign AMA papers. Pt explained risks of leaving AMA including worsening of symptoms and risk of death. Pt verbalized understanding.   T(C): 36.3 (09-05-23 @ 05:17), Max: 36.7 (09-04-23 @ 23:28)  HR: 83 (09-05-23 @ 10:00) (65 - 93)  BP: 110/74 (09-05-23 @ 10:00) (102/56 - 130/83)  RR: 16 (09-05-23 @ 05:17) (16 - 18)  SpO2: 99% (09-05-23 @ 10:00) (95% - 99%)  Dr. Hurd notified and aware.    BRIAN Parham.

## 2023-09-05 NOTE — OCCUPATIONAL THERAPY INITIAL EVALUATION ADULT - IMPAIRED TRANSFERS: BED/CHAIR, REHAB EVAL
ataxic/impaired balance/impaired coordination/decreased flexibility/impaired motor control/abnormal muscle tone/decreased ROM/decreased strength

## 2023-09-05 NOTE — OCCUPATIONAL THERAPY INITIAL EVALUATION ADULT - IMPAIRED TRANSFERS: SIT/STAND, REHAB EVAL
ataxic/impaired balance/impaired coordination/decreased flexibility/impaired motor control/narrow base of support/pain/impaired postural control/decreased ROM/decreased strength

## 2023-09-05 NOTE — OCCUPATIONAL THERAPY INITIAL EVALUATION ADULT - ADDITIONAL COMMENTS
Prior to admission, pt was functioning in his roles, self sufficient & ambulating independently with SAC. Presently pt needs assistance with lower body self care tasks due to low back pain, weakness, stiffness and decreased ROM f. Pt is right hand dominant and wears glasses for reading. Pt is able to reach out of base of support in sitting without loss of balance but unable to do it in standing.

## 2023-09-05 NOTE — OCCUPATIONAL THERAPY INITIAL EVALUATION ADULT - PERTINENT HX OF CURRENT PROBLEM, REHAB EVAL
Pt is  29 y/o presented to ER due to BIBA c/o chest pain described as tightness sensation. As per chart Patient noted additionally generalized weakness ongoing x 1 month with associated increased falls. Patient reported occasional urinary incontinence x 1 month ". Pt is diagnosed with generalized weakness. Pt has h/o Guillain barre syndrome x 6 yrs,  cocaine abuse, asthma, and HIV.  CXR on 9/3/23 results confirm no acute finding or change.

## 2023-09-05 NOTE — OCCUPATIONAL THERAPY INITIAL EVALUATION ADULT - PLANNED THERAPY INTERVENTIONS, OT EVAL
ADL retraining/IADL retraining/balance training/bed mobility training/joint mobilization/massage/neuromuscular re-education/parent/caregiver training.../strengthening/stretching/transfer training

## 2023-09-05 NOTE — OCCUPATIONAL THERAPY INITIAL EVALUATION ADULT - SOCIAL CONCERNS
Pt voiced concerns about his recovery at home due to lack of support. ./Complex psychosocial needs/coping issues

## 2023-09-05 NOTE — OCCUPATIONAL THERAPY INITIAL EVALUATION ADULT - NSOTDISCHREC_GEN_A_CORE
Acute Rehab to further improve balance, strength ADL mananement. Pt's was independent . Patient now off functional baseline. Patient will tolerate 2-3 rehab disciplines to address functional needs. Patient will benefit from MD supervision and rehab nursing through course of rehab to monitor/address complex medical needs./Acute Inpatient Rehab

## 2023-09-05 NOTE — OCCUPATIONAL THERAPY INITIAL EVALUATION ADULT - PRECAUTIONS/LIMITATIONS, REHAB EVAL
Pt has unsteady gait with decreased protective responed due to BLE with increased tone and decrease codomination/fall precautions

## 2023-09-05 NOTE — OCCUPATIONAL THERAPY INITIAL EVALUATION ADULT - BALANCE TRAINING, PT EVAL
Pt will increased standing balance from fair to fair+ in 30 days to prevent falls, optimize pt's ability for ADL management & safely navigate in all terrains

## 2023-09-05 NOTE — OCCUPATIONAL THERAPY INITIAL EVALUATION ADULT - GENERAL OBSERVATIONS, REHAB EVAL
Pt was seen for initial OT consult, encountered in bed in NAD; pt was AA&Ox4, cooperative & followed commands. Pt c/o low back pain. Pt presents with residual lower body weakness due h/o GBS ; this limits pt's activity tolerance ,balance, ADL management and functional mobility.

## 2023-09-06 LAB
HIV-1 VIRAL LOAD RESULT: ABNORMAL
HIV1 RNA # SERPL NAA+PROBE: SIGNIFICANT CHANGE UP
HIV1 RNA SER-IMP: SIGNIFICANT CHANGE UP
HIV1 RNA SERPL NAA+PROBE-ACNC: ABNORMAL
HIV1 RNA SERPL NAA+PROBE-LOG#: 3.18 — SIGNIFICANT CHANGE UP

## 2023-09-13 DIAGNOSIS — B20 HUMAN IMMUNODEFICIENCY VIRUS [HIV] DISEASE: ICD-10-CM

## 2023-09-13 DIAGNOSIS — G61.0 GUILLAIN-BARRE SYNDROME: ICD-10-CM

## 2023-09-13 DIAGNOSIS — M25.50 PAIN IN UNSPECIFIED JOINT: ICD-10-CM

## 2023-09-13 DIAGNOSIS — Z88.5 ALLERGY STATUS TO NARCOTIC AGENT: ICD-10-CM

## 2023-09-13 DIAGNOSIS — Z59.02 UNSHELTERED HOMELESSNESS: ICD-10-CM

## 2023-09-13 DIAGNOSIS — J32.9 CHRONIC SINUSITIS, UNSPECIFIED: ICD-10-CM

## 2023-09-13 DIAGNOSIS — J45.909 UNSPECIFIED ASTHMA, UNCOMPLICATED: ICD-10-CM

## 2023-09-13 DIAGNOSIS — F14.19 COCAINE ABUSE WITH UNSPECIFIED COCAINE-INDUCED DISORDER: ICD-10-CM

## 2023-09-13 DIAGNOSIS — R53.1 WEAKNESS: ICD-10-CM

## 2023-09-13 SDOH — ECONOMIC STABILITY - HOUSING INSECURITY: UNSHELTERED HOMELESSNESS: Z59.02

## 2023-09-19 NOTE — PATIENT PROFILE ADULT - FUNCTIONAL ASSESSMENT - BASIC MOBILITY 6.
Melolabial Interpolation Flap Text: In order to maintain the form and function of the airway, and to maintain the alar groove, a two-stage interpolation axial flap and staged reconstruction was planned for closure.  A telfa template was made of the defect.  This was then used to outline the cheek interpolation flap.  The donor area for the pedicle flap was then anesthetized.  The flap was incised through the skin and subcutaneous tissue down to the layer of the underlying musculature.  The flap was carefully incised within the deep plane to maintain its blood supply. The pedicle was then carried over into position and sutured.  \\n\\nTo close the donor-site defect on the cheek, an advancement flap was created by wide undermining laterally in the subcutaneous plane. After hemostasis was obtained, this flap was advanced medially and sutured in a layered fashion.  The portion of the advancement flap near the pedicle of the interpolation flap was closed with care, so as not to constrict the vasculature of the pedicle.   \\n\\nOnce the flaps were sutured into place, adequate blood supply was confirmed with blanching and refill.  The pedicle was wrapped with xeroform gauze and dressed with telfa and gauze bandage to ensure continued blood supply and protect the attached pedicle. 3 = A little assistance

## 2023-09-21 ENCOUNTER — INPATIENT (INPATIENT)
Facility: HOSPITAL | Age: 33
LOS: 5 days | Discharge: LEFT BEFORE DISCHARGE | End: 2023-09-27
Attending: HOSPITALIST | Admitting: HOSPITALIST
Payer: MEDICAID

## 2023-09-21 VITALS
WEIGHT: 190.04 LBS | OXYGEN SATURATION: 100 % | HEIGHT: 72 IN | TEMPERATURE: 98 F | RESPIRATION RATE: 16 BRPM | DIASTOLIC BLOOD PRESSURE: 94 MMHG | SYSTOLIC BLOOD PRESSURE: 134 MMHG | HEART RATE: 88 BPM

## 2023-09-21 DIAGNOSIS — R26.81 UNSTEADINESS ON FEET: ICD-10-CM

## 2023-09-21 DIAGNOSIS — M54.9 DORSALGIA, UNSPECIFIED: ICD-10-CM

## 2023-09-21 DIAGNOSIS — Z29.9 ENCOUNTER FOR PROPHYLACTIC MEASURES, UNSPECIFIED: ICD-10-CM

## 2023-09-21 DIAGNOSIS — F39 UNSPECIFIED MOOD [AFFECTIVE] DISORDER: ICD-10-CM

## 2023-09-21 DIAGNOSIS — R51.9 HEADACHE, UNSPECIFIED: ICD-10-CM

## 2023-09-21 DIAGNOSIS — Z98.890 OTHER SPECIFIED POSTPROCEDURAL STATES: Chronic | ICD-10-CM

## 2023-09-21 DIAGNOSIS — B20 HUMAN IMMUNODEFICIENCY VIRUS [HIV] DISEASE: ICD-10-CM

## 2023-09-21 DIAGNOSIS — R32 UNSPECIFIED URINARY INCONTINENCE: ICD-10-CM

## 2023-09-21 LAB
ALBUMIN SERPL ELPH-MCNC: 4.5 G/DL — SIGNIFICANT CHANGE UP (ref 3.3–5)
ALP SERPL-CCNC: 69 U/L — SIGNIFICANT CHANGE UP (ref 40–120)
ALT FLD-CCNC: 21 U/L — SIGNIFICANT CHANGE UP (ref 4–41)
AMPHET UR-MCNC: NEGATIVE — SIGNIFICANT CHANGE UP
ANION GAP SERPL CALC-SCNC: 13 MMOL/L — SIGNIFICANT CHANGE UP (ref 7–14)
APPEARANCE UR: CLEAR — SIGNIFICANT CHANGE UP
APTT BLD: 33 SEC — SIGNIFICANT CHANGE UP (ref 24.5–35.6)
AST SERPL-CCNC: 27 U/L — SIGNIFICANT CHANGE UP (ref 4–40)
BARBITURATES UR SCN-MCNC: NEGATIVE — SIGNIFICANT CHANGE UP
BASOPHILS # BLD AUTO: 0.01 K/UL — SIGNIFICANT CHANGE UP (ref 0–0.2)
BASOPHILS NFR BLD AUTO: 0.2 % — SIGNIFICANT CHANGE UP (ref 0–2)
BENZODIAZ UR-MCNC: NEGATIVE — SIGNIFICANT CHANGE UP
BILIRUB SERPL-MCNC: 0.5 MG/DL — SIGNIFICANT CHANGE UP (ref 0.2–1.2)
BILIRUB UR-MCNC: NEGATIVE — SIGNIFICANT CHANGE UP
BLD GP AB SCN SERPL QL: NEGATIVE — SIGNIFICANT CHANGE UP
BUN SERPL-MCNC: 12 MG/DL — SIGNIFICANT CHANGE UP (ref 7–23)
CALCIUM SERPL-MCNC: 9.3 MG/DL — SIGNIFICANT CHANGE UP (ref 8.4–10.5)
CHLORIDE SERPL-SCNC: 98 MMOL/L — SIGNIFICANT CHANGE UP (ref 98–107)
CO2 SERPL-SCNC: 26 MMOL/L — SIGNIFICANT CHANGE UP (ref 22–31)
COCAINE METAB.OTHER UR-MCNC: POSITIVE
COLOR SPEC: YELLOW — SIGNIFICANT CHANGE UP
CREAT SERPL-MCNC: 0.96 MG/DL — SIGNIFICANT CHANGE UP (ref 0.5–1.3)
CREATININE URINE RESULT, DAU: 116 MG/DL — SIGNIFICANT CHANGE UP
CRYPTOC AG FLD QL: NEGATIVE — SIGNIFICANT CHANGE UP
DIFF PNL FLD: NEGATIVE — SIGNIFICANT CHANGE UP
EGFR: 106 ML/MIN/1.73M2 — SIGNIFICANT CHANGE UP
EOSINOPHIL # BLD AUTO: 0.03 K/UL — SIGNIFICANT CHANGE UP (ref 0–0.5)
EOSINOPHIL NFR BLD AUTO: 0.5 % — SIGNIFICANT CHANGE UP (ref 0–6)
GLUCOSE SERPL-MCNC: 95 MG/DL — SIGNIFICANT CHANGE UP (ref 70–99)
GLUCOSE UR QL: NEGATIVE MG/DL — SIGNIFICANT CHANGE UP
HCT VFR BLD CALC: 39.5 % — SIGNIFICANT CHANGE UP (ref 39–50)
HGB BLD-MCNC: 12.9 G/DL — LOW (ref 13–17)
IANC: 3.42 K/UL — SIGNIFICANT CHANGE UP (ref 1.8–7.4)
IMM GRANULOCYTES NFR BLD AUTO: 0.2 % — SIGNIFICANT CHANGE UP (ref 0–0.9)
INR BLD: 1.08 RATIO — SIGNIFICANT CHANGE UP (ref 0.85–1.18)
KETONES UR-MCNC: NEGATIVE MG/DL — SIGNIFICANT CHANGE UP
LEUKOCYTE ESTERASE UR-ACNC: NEGATIVE — SIGNIFICANT CHANGE UP
LYMPHOCYTES # BLD AUTO: 2.12 K/UL — SIGNIFICANT CHANGE UP (ref 1–3.3)
LYMPHOCYTES # BLD AUTO: 35.1 % — SIGNIFICANT CHANGE UP (ref 13–44)
MCHC RBC-ENTMCNC: 28.9 PG — SIGNIFICANT CHANGE UP (ref 27–34)
MCHC RBC-ENTMCNC: 32.7 GM/DL — SIGNIFICANT CHANGE UP (ref 32–36)
MCV RBC AUTO: 88.6 FL — SIGNIFICANT CHANGE UP (ref 80–100)
METHADONE UR-MCNC: NEGATIVE — SIGNIFICANT CHANGE UP
MONOCYTES # BLD AUTO: 0.45 K/UL — SIGNIFICANT CHANGE UP (ref 0–0.9)
MONOCYTES NFR BLD AUTO: 7.5 % — SIGNIFICANT CHANGE UP (ref 2–14)
NEUTROPHILS # BLD AUTO: 3.42 K/UL — SIGNIFICANT CHANGE UP (ref 1.8–7.4)
NEUTROPHILS NFR BLD AUTO: 56.5 % — SIGNIFICANT CHANGE UP (ref 43–77)
NITRITE UR-MCNC: NEGATIVE — SIGNIFICANT CHANGE UP
NRBC # BLD: 0 /100 WBCS — SIGNIFICANT CHANGE UP (ref 0–0)
NRBC # FLD: 0 K/UL — SIGNIFICANT CHANGE UP (ref 0–0)
OPIATES UR-MCNC: NEGATIVE — SIGNIFICANT CHANGE UP
OXYCODONE UR-MCNC: POSITIVE
PCP SPEC-MCNC: SIGNIFICANT CHANGE UP
PCP UR-MCNC: NEGATIVE — SIGNIFICANT CHANGE UP
PH UR: 7 — SIGNIFICANT CHANGE UP (ref 5–8)
PLATELET # BLD AUTO: 255 K/UL — SIGNIFICANT CHANGE UP (ref 150–400)
POTASSIUM SERPL-MCNC: 4 MMOL/L — SIGNIFICANT CHANGE UP (ref 3.5–5.3)
POTASSIUM SERPL-SCNC: 4 MMOL/L — SIGNIFICANT CHANGE UP (ref 3.5–5.3)
PROT SERPL-MCNC: 8.4 G/DL — HIGH (ref 6–8.3)
PROT UR-MCNC: NEGATIVE MG/DL — SIGNIFICANT CHANGE UP
PROTHROM AB SERPL-ACNC: 12.1 SEC — SIGNIFICANT CHANGE UP (ref 9.5–13)
RBC # BLD: 4.46 M/UL — SIGNIFICANT CHANGE UP (ref 4.2–5.8)
RBC # FLD: 12.5 % — SIGNIFICANT CHANGE UP (ref 10.3–14.5)
RH IG SCN BLD-IMP: POSITIVE — SIGNIFICANT CHANGE UP
SODIUM SERPL-SCNC: 137 MMOL/L — SIGNIFICANT CHANGE UP (ref 135–145)
SP GR SPEC: 1.02 — SIGNIFICANT CHANGE UP (ref 1–1.03)
THC UR QL: POSITIVE
TROPONIN T, HIGH SENSITIVITY RESULT: <6 NG/L — SIGNIFICANT CHANGE UP
UROBILINOGEN FLD QL: 1 MG/DL — SIGNIFICANT CHANGE UP (ref 0.2–1)
WBC # BLD: 6.04 K/UL — SIGNIFICANT CHANGE UP (ref 3.8–10.5)
WBC # FLD AUTO: 6.04 K/UL — SIGNIFICANT CHANGE UP (ref 3.8–10.5)

## 2023-09-21 PROCEDURE — 70498 CT ANGIOGRAPHY NECK: CPT | Mod: 26,MA

## 2023-09-21 PROCEDURE — 99223 1ST HOSP IP/OBS HIGH 75: CPT

## 2023-09-21 PROCEDURE — 99223 1ST HOSP IP/OBS HIGH 75: CPT | Mod: GC

## 2023-09-21 PROCEDURE — 99285 EMERGENCY DEPT VISIT HI MDM: CPT | Mod: 25

## 2023-09-21 PROCEDURE — 70450 CT HEAD/BRAIN W/O DYE: CPT | Mod: 26,59,MA

## 2023-09-21 PROCEDURE — 70496 CT ANGIOGRAPHY HEAD: CPT | Mod: 26,76

## 2023-09-21 RX ORDER — PREGABALIN 225 MG/1
1000 CAPSULE ORAL DAILY
Refills: 0 | Status: DISCONTINUED | OUTPATIENT
Start: 2023-09-21 | End: 2023-09-27

## 2023-09-21 RX ORDER — OXYCODONE HYDROCHLORIDE 5 MG/1
5 TABLET ORAL EVERY 6 HOURS
Refills: 0 | Status: DISCONTINUED | OUTPATIENT
Start: 2023-09-21 | End: 2023-09-27

## 2023-09-21 RX ORDER — ENOXAPARIN SODIUM 100 MG/ML
40 INJECTION SUBCUTANEOUS EVERY 24 HOURS
Refills: 0 | Status: DISCONTINUED | OUTPATIENT
Start: 2023-09-21 | End: 2023-09-27

## 2023-09-21 RX ORDER — TRAMADOL HYDROCHLORIDE 50 MG/1
50 TABLET ORAL ONCE
Refills: 0 | Status: DISCONTINUED | OUTPATIENT
Start: 2023-09-21 | End: 2023-09-21

## 2023-09-21 RX ORDER — BACLOFEN 100 %
10 POWDER (GRAM) MISCELLANEOUS EVERY 8 HOURS
Refills: 0 | Status: DISCONTINUED | OUTPATIENT
Start: 2023-09-21 | End: 2023-09-27

## 2023-09-21 RX ORDER — ACETAMINOPHEN 500 MG
650 TABLET ORAL EVERY 6 HOURS
Refills: 0 | Status: DISCONTINUED | OUTPATIENT
Start: 2023-09-21 | End: 2023-09-27

## 2023-09-21 RX ORDER — BICTEGRAVIR SODIUM, EMTRICITABINE, AND TENOFOVIR ALAFENAMIDE FUMARATE 30; 120; 15 MG/1; MG/1; MG/1
1 TABLET ORAL DAILY
Refills: 0 | Status: DISCONTINUED | OUTPATIENT
Start: 2023-09-21 | End: 2023-09-27

## 2023-09-21 RX ORDER — QUETIAPINE FUMARATE 200 MG/1
100 TABLET, FILM COATED ORAL DAILY
Refills: 0 | Status: DISCONTINUED | OUTPATIENT
Start: 2023-09-21 | End: 2023-09-27

## 2023-09-21 RX ORDER — LANOLIN ALCOHOL/MO/W.PET/CERES
3 CREAM (GRAM) TOPICAL AT BEDTIME
Refills: 0 | Status: DISCONTINUED | OUTPATIENT
Start: 2023-09-21 | End: 2023-09-27

## 2023-09-21 RX ORDER — QUETIAPINE FUMARATE 200 MG/1
300 TABLET, FILM COATED ORAL AT BEDTIME
Refills: 0 | Status: DISCONTINUED | OUTPATIENT
Start: 2023-09-21 | End: 2023-09-27

## 2023-09-21 RX ORDER — CYCLOBENZAPRINE HYDROCHLORIDE 10 MG/1
10 TABLET, FILM COATED ORAL DAILY
Refills: 0 | Status: DISCONTINUED | OUTPATIENT
Start: 2023-09-21 | End: 2023-09-23

## 2023-09-21 RX ORDER — INFLUENZA VIRUS VACCINE 15; 15; 15; 15 UG/.5ML; UG/.5ML; UG/.5ML; UG/.5ML
0.5 SUSPENSION INTRAMUSCULAR ONCE
Refills: 0 | Status: DISCONTINUED | OUTPATIENT
Start: 2023-09-21 | End: 2023-09-25

## 2023-09-21 RX ORDER — LIDOCAINE 4 G/100G
1 CREAM TOPICAL EVERY 24 HOURS
Refills: 0 | Status: DISCONTINUED | OUTPATIENT
Start: 2023-09-21 | End: 2023-09-27

## 2023-09-21 RX ADMIN — QUETIAPINE FUMARATE 300 MILLIGRAM(S): 200 TABLET, FILM COATED ORAL at 22:56

## 2023-09-21 RX ADMIN — TRAMADOL HYDROCHLORIDE 50 MILLIGRAM(S): 50 TABLET ORAL at 06:36

## 2023-09-21 RX ADMIN — OXYCODONE HYDROCHLORIDE 5 MILLIGRAM(S): 5 TABLET ORAL at 13:30

## 2023-09-21 RX ADMIN — Medication 10 MILLIGRAM(S): at 13:59

## 2023-09-21 RX ADMIN — Medication 10 MILLIGRAM(S): at 22:56

## 2023-09-21 RX ADMIN — QUETIAPINE FUMARATE 100 MILLIGRAM(S): 200 TABLET, FILM COATED ORAL at 13:30

## 2023-09-21 RX ADMIN — OXYCODONE HYDROCHLORIDE 5 MILLIGRAM(S): 5 TABLET ORAL at 14:07

## 2023-09-21 RX ADMIN — Medication 100 MILLIGRAM(S): at 17:53

## 2023-09-21 RX ADMIN — PREGABALIN 1000 MICROGRAM(S): 225 CAPSULE ORAL at 13:30

## 2023-09-21 RX ADMIN — CYCLOBENZAPRINE HYDROCHLORIDE 10 MILLIGRAM(S): 10 TABLET, FILM COATED ORAL at 13:29

## 2023-09-21 RX ADMIN — ENOXAPARIN SODIUM 40 MILLIGRAM(S): 100 INJECTION SUBCUTANEOUS at 13:29

## 2023-09-21 RX ADMIN — BICTEGRAVIR SODIUM, EMTRICITABINE, AND TENOFOVIR ALAFENAMIDE FUMARATE 1 TABLET(S): 30; 120; 15 TABLET ORAL at 13:29

## 2023-09-21 NOTE — STROKE CODE NOTE - NIH STROKE SCALE: 6A. MOTOR LEG, LEFT, QM
(2) Some effort against gravity; leg falls to bed by 5 secs, but has some effort against gravity Xenograft Text: The defect edges were debeveled with a #15 scalpel blade.  Given the location of the defect, shape of the defect and the proximity to free margins a xenograft was deemed most appropriate.  The graft was then trimmed to fit the size of the defect.  The graft was then placed in the primary defect and oriented appropriately.

## 2023-09-21 NOTE — ED ADULT NURSE REASSESSMENT NOTE - NS ED NURSE REASSESS COMMENT FT1
pt A&Ox4 c/o pain. bolus remains running at bedside. respirations even and unlabored. labs drawn and sent. awaiting dispo. safety maintained, side rails up

## 2023-09-21 NOTE — CHART NOTE - NSCHARTNOTEFT_GEN_A_CORE
Patient Demographic Information (PDI)       PDI	First Name	Last Name	Birth Date	Gender	Street Address	[facility]	Zip Code  A	Laurence	Waldo	1990	Male	[address]	[place] [facility]	[address]  B	Laurence	Ady	1990	Male	[address]	[place] [facility]	[address]    Prescription Information      PDI Filter:    PDI	My Rx	Current Rx	Drug Type	Rx Written	Rx Dispensed	Drug	Quantity	Days Supply	Prescriber Name	Prescriber ANTONIETA #	Payment Method	Dispenser  A	N	N		06/30/2023	06/30/2023	pregabalin 50 mg capsule	60	20	Shante Stanley	[id number]	Medicaid	[facility]  B	N	N	O	08/14/2023	08/14/2023	oxycodone hcl (ir) 5 mg tablet	8	2	Raheem Menendez	[id number]	Medicaid	[facility]  B	N	N		06/24/2023	06/24/2023	pregabalin 100 mg capsule	60	30	Alfonso Durand	[id number]	Medicaid	[facility]  B	N	N	O	04/25/2023	04/25/2023	oxycodone-acetaminophen 5-325 mg tablet	6	2	[facility]	[id number]	Medicaid	[facility]  B	N	N	O	10/27/2022	10/27/2022	oxycodone hcl (ir) 5 mg tablet	20	7	[facility]ish Waters	[id number]	Insurance	[facility]

## 2023-09-21 NOTE — H&P ADULT - PROBLEM SELECTOR PLAN 3
--Denies dysuria, hematuria but w/ intermittent incontinence and retention  --Check UA  --Rest of mgt as above

## 2023-09-21 NOTE — H&P ADULT - ASSESSMENT
34yo M hx of  HIV on biktarvy, ?HIV myelopathy, chronic back pain, substance use, GBS? s/p influenza vaccine p/w headaches and gait instability.

## 2023-09-21 NOTE — PATIENT PROFILE ADULT - FUNCTIONAL ASSESSMENT - BASIC MOBILITY 6.
2-calculated by average/Not able to assess (calculate score using [facility] averaging method)

## 2023-09-21 NOTE — H&P ADULT - NSHPREVIEWOFSYSTEMS_GEN_ALL_CORE
CONSTITUTIONAL:  No weight loss, fever, chills, weakness or fatigue.  HEENT:    No visual loss, blurred vision, No hearing loss, sneezing, congestion, runny nose or sore throat.  SKIN:  No rash or itching.  CARDIOVASCULAR:  No chest pain or chest discomfort. No palpitations.  RESPIRATORY:  No shortness of breath, cough or sputum.  GASTROINTESTINAL:  No nausea, vomiting or diarrhea. No abdominal pain or blood.  GENITOURINARY:  Denies hematuria, dysuria.   NEUROLOGICAL:  + headache, +dizziness, no numbness or tingling in the extremities. +change in bladder control.  MUSCULOSKELETAL:  +muscle/back pain, no joint pain or stiffness.  HEMATOLOGIC:  No  bleeding or bruising.  ENDOCRINOLOGIC:  No reports of sweating, cold or heat intolerance. No polyuria or polydipsia.

## 2023-09-21 NOTE — H&P ADULT - NSICDXPASTMEDICALHX_GEN_ALL_CORE_FT
PAST MEDICAL HISTORY:  Asthma     Chronic sinusitis     Closed fracture of multiple ribs of right side, initial encounter     Cocaine abuse     GBS (Guillain [place] syndrome)     HIV (human immunodeficiency virus infection) from birth    HIV disease     Homeless

## 2023-09-21 NOTE — PATIENT PROFILE ADULT - FALL HARM RISK - HARM RISK INTERVENTIONS

## 2023-09-21 NOTE — ED ADULT TRIAGE NOTE - CHIEF COMPLAINT QUOTE
Pt arrives to ED c/o migraine starting yesterday morning.  Pt has not taken any pain meds.  fs = 102.  Pt reports new onset difficulty walking starting at 05:30. Pt unable to lift left leg.  EMILEE Maher consulted.  CODE Stroke called due to symptoms within 24 hours.  Pt denies other deficits. Hx of HIV. [place].

## 2023-09-21 NOTE — H&P ADULT - NSHPPHYSICALEXAM_GEN_ALL_CORE
GENERAL: NAD  HEAD:  Atraumatic, Normocephalic  EYES: EOMI, conjunctiva and sclera clear  NECK: Supple  CHEST/LUNG: Clear to auscultation bilaterally; No wheeze, rhonchi, crackles or rales  HEART: Regular rate and rhythm; No murmurs, rubs, or gallops  ABDOMEN: Soft, Nontender, Nondistended; Bowel sounds present  EXTREMITIES:  2+ Peripheral Pulses, No edema  PSYCH: AAOx3  NEUROLOGY: +3/5 strength LE B/L , +4/5 upper ext b/l , sensation dec B/L LE  SKIN: Warm and dry GENERAL: NAD  HEAD:  Atraumatic, Normocephalic  EYES: EOMI, conjunctiva and sclera clear  NECK: Supple  CHEST/LUNG: Clear to auscultation bilaterally; No wheeze, rhonchi, crackles or rales  HEART: Regular rate and rhythm; No murmurs, rubs, or gallops  ABDOMEN: Soft, Nontender, Nondistended; Bowel sounds present  EXTREMITIES:  2+ Peripheral Pulses, No edema  PSYCH: AAOx3  NEUROLOGY: +3/5 strength LE B/L , +4/5 upper ext b/l , sensation dec B/L LE. neg kernig/brudzinski sign, no neck stiffness    SKIN: Warm and dry

## 2023-09-21 NOTE — H&P ADULT - NSHPLABSRESULTS_GEN_ALL_CORE
12.9   6.04  )-----------( 255      ( 21 Sep 2023 01:20 )             39.5       09-21    137  |  98  |  12  ----------------------------<  95  4.0   |  26  |  0.96    Ca    9.3      21 Sep 2023 01:20    TPro  8.4<H>  /  Alb  4.5  /  TBili  0.5  /  DBili  x   /  AST  27  /  ALT  21  /  AlkPhos  69  09-21              Urinalysis Basic - ( 21 Sep 2023 01:20 )    Color: x / Appearance: x / SG: x / pH: x  Gluc: 95 mg/dL / Ketone: x  / Bili: x / Urobili: x   Blood: x / Protein: x / Nitrite: x   Leuk Esterase: x / RBC: x / WBC x   Sq Epi: x / Non Sq Epi: x / Bacteria: x        PT/INR - ( 21 Sep 2023 01:20 )   PT: 12.1 sec;   INR: 1.08 ratio         PTT - ( 21 Sep 2023 01:20 )  PTT:33.0 sec          CAPILLARY BLOOD GLUCOSE      POCT Blood Glucose.: 102 mg/dL (21 Sep 2023 00:50)              RADIOLOGY & ADDITIONAL TESTS:    Imaging personally reviewed.    Consultant(s) notes reviewed.    Care discussed with consultants and other providers.

## 2023-09-21 NOTE — CONSULT NOTE ADULT - ATTENDING COMMENTS
33 M with congenital HIV, question of HIV myelopathy, chronic back pain, substance use, reported hx of Guillain-[place] after influenza vaccination.  On 9/21 presented to ED with CALABRESE.  Seen multiple times in the past by neurology with back pain limiting gait, with multiple MRIs of T- and L-spine and sometimes also C-spine showing no explanatory findings.    He reports 1 week of urinary urgency with urge incontinence, sometimes also urinary incontinence without realizing he had urinated.  On 9/20 AM woke up with posterior dull throbbing headache, later in the day noted L>RUE weakness. Symptoms worsened.  No associated vision changes, speech changes, confusion.  Also noted dizziness on 9/20, spinning sensation with nausea, resolved this morning 9/21 after getting medication.  Now HA has improved.  Main complaint now is sharp back pain and sharp BLE pain limiting mobility, happens with movement of head or legs.    Exam 9/21:  Pain with passive and active movement of BLE, and passive movement of head - pain elicited in back and BLE. Kernig and Brudzinski negative (elicit pain but no spontaneous movement of head/legs)  Cognition: Awake, alert, oriented to situation, answers questions and follows commands briskly, attentive to conversation, provides appropriate history  Language: fluent, comprehension intact  CN: PERRL 2.5 mm, EOMI, no nystagmus, face symmetric, no dysarthria  Motor: BUE no drift. LLE not antigravity, pain-limited. RLE antigravity with pain.  Reflexes: 3+ b/l biceps, triceps, BR, patellar with suprapatellar. Ankles unable to elicit, pt not relaxed vs. ankles ?contracted. Plantars down b/l.  Sensory: proprioception intact b/l 1st toes  Gait: stands from bed with pain, holds onto object for support/pulls self up with it, narrow stance. on attempting 1-2 steps forward, leaning forward and swaying widely, unsteady - was brought back to bed safely. no truncal ataxia, able to sit up in bed without support.    CTH: stable mild ventriculomegaly. 5-mm focus in 4th ventricle stable since 2009 likely choroid plexus.  CTAhn: no significant stenosis  CTV: no venous sinus thrombosis    Afebrile, no leukocytosis  ESR, CRP elevated    SHAI 1:160 speckled in the past  HTLV I/II neg, copper and ACE WNL in the past    2021 CSF protein 45, no WBC    07/2023 MR TL-spine w/wo: no cord signal abnormalities or abnormal enhancement  05/2021 CTL-spine w/wo (most recent C-spine MRI): motion-limited. no abnormality of cord. likely adenoid hypertrophy    Assessment:  Possible pain-limited mobility, although pt has BLE pain but worse mobility of LLE than R  Symmetric diffuse hyperreflexia    Recommendations:  -MRI brain w/wo  -MRI full-spine w/wo  -can increase cyclobenzaprine to 5 mg AM / 5 mg mid-day / 10 mg QHS  -can increase pregabalin to 100 mg AM / 150 mg PM x3 days, then if tolerated and if no improvement in pain, increase to pregabalin 150 mg bid  -rest as above    Julianna Loyd MD 33 M with congenital HIV, question of HIV myelopathy, chronic back pain, substance use, reported hx of Guillain-[place] after influenza vaccination.  On 9/21 presented to ED with CALABRESE.  Seen multiple times in the past by neurology with back pain limiting gait, with multiple MRIs of T- and L-spine and sometimes also C-spine showing no explanatory findings.    He reports 1 week of urinary urgency with urge incontinence, sometimes also urinary incontinence without realizing he had urinated.  On 9/20 AM woke up with posterior dull throbbing headache, later in the day noted L>RUE weakness. Symptoms worsened.  No associated vision changes, speech changes, confusion.  Also noted dizziness on 9/20, spinning sensation with nausea, resolved this morning 9/21 after getting medication.  Now HA has improved.  Main complaint now is sharp back pain and sharp BLE pain limiting mobility, happens with movement of head or legs.    Exam 9/21:  Pain with passive and active movement of BLE, and passive movement of head - pain elicited in back and BLE. Kernig and Brudzinski negative (elicit pain but no spontaneous movement of head/legs)  Cognition: Awake, alert, oriented to situation, answers questions and follows commands briskly, attentive to conversation, provides appropriate history  Language: fluent, comprehension intact  CN: PERRL 2.5 mm, EOMI, no nystagmus, face symmetric, no dysarthria  Motor: BUE no drift. LLE not antigravity, pain-limited. RLE antigravity with pain.  Reflexes: 3+ b/l biceps, triceps, BR, patellar with suprapatellar. Ankles unable to elicit, pt not relaxed vs. ankles ?contracted. Plantars down b/l.  Sensory: proprioception intact b/l 1st toes  Gait: stands from bed with pain, holds onto object for support/pulls self up with it, narrow stance. on attempting 1-2 steps forward, leaning forward and swaying widely, unsteady - was brought back to bed safely. no truncal ataxia, able to sit up in bed without support.    CTH: stable mild ventriculomegaly. 5-mm focus in 4th ventricle stable since 2009 likely choroid plexus.  CTAhn: no significant stenosis  CTV: no venous sinus thrombosis    Afebrile, no leukocytosis  ESR, CRP elevated    SHAI 1:160 speckled in the past  HTLV I/II neg, copper and ACE WNL in the past    2021 CSF protein 45, no WBC    07/2023 MR TL-spine w/wo: no cord signal abnormalities or abnormal enhancement  05/2021 CTL-spine w/wo (most recent C-spine MRI): motion-limited. no abnormality of cord. likely adenoid hypertrophy    Assessment:  Possible pain-limited mobility, although pt has BLE pain but worse mobility of LLE than R  Symmetric diffuse hyperreflexia    Recommendations:  -MRI brain w/wo  -MRI full-spine w/wo  -can increase cyclobenzaprine to 5 mg AM / 5 mg mid-day / 10 mg QHS  -can increase pregabalin to 100 mg AM / 150 mg PM x3 days, then if tolerated and if no improvement in pain, increase to pregabalin 150 mg bid  -pain management clinic on discharge, if he doesn't already follow with pain management  -follow up in neurology clinic on discharge  -rest as above    Julianna Loyd MD

## 2023-09-21 NOTE — H&P ADULT - HISTORY OF PRESENT ILLNESS
34yo M hx of  HIV on biktarvy, ?HIV myelopathy, chronic back pain, substance use, GBS? s/p influenza vaccine p/w headaches and gait instability. Pt states sx started on Wednesday morning. Describes headaches as constant and located in the occipital region. Also w/ associated photophobia and neck stiffness. Otherwise w/o aggravating factors. States later in the day he noted gait instability; "like I was tripping over myself" (at baseline able to ambulate on his own). Also w/ dizziness and light headedness prompting pt to come to the E.D. ROS also positive for urinary incontience w/ intermittent urinary retention which pt states has been ongoing for 1 week. At this time denies fecal incontinence or saddle anesthesia. Also reports back pain which pt states is chronic as well as muscle spasms which pt states is typically intermittent but has been worsening over the past week. Intermittent tearful during interview, 2/2 multiple hospitalizations and E.D visits for similar presentations. Discusses fears of dying and feelings of isolation. Denies SI/SA. ROS otherwise negative. Denies fevers, chills. rhinorrhea, nasal congestion, chest pain, SOB, abdominal pain, constipation, diarrhea, vision or hearing difficulties, numbness/tingling.     In the E.D, vitals stable. CODE stroke activated. Neuro  consulted. Admitted to medicine for further mgt.

## 2023-09-21 NOTE — CONSULT NOTE ADULT - ASSESSMENT
CT head w/o con 9/21/23: No CT evidence of acute intracranial pathology. Stable, mild ventriculomegaly, greater than expected for the patient's age.  No evidence for acute hydrocephalus.  CT venogram and angio w/ con 9/21/23: vasculature is patent without evidence of atherosclerosis, stenosis or dissection, arteriovenous malformation, dural venous sinus thrombosis.    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 s/p influenza vaccine  who presents to ED for headaches. He states LKW 9/20/23 Wed 1 AM when he went to bed. Woke up 9/20/23 Wed 5:30AM with occipital region headache. Has also has had subjective feeling his neck was stiff, lower back hurting. At baseline due to GBS he does have difficulty ambulating but still able to ambulate. Now due to his symptoms he is having difficulty ambulating independently without assistive devices. Of note per chart review, he has been seen multiple times at multiple [facility] for fluctuating/ worsening LE weakness, urinary retention/ and sometimes incontinence, saddle anesthesia. Seen by neuro on multiple times (last in 7/5- [place]) and has had had multiple spine MRIs without cord/ cauda equina compression/ abnormal lesion or enhancement, LP in 2021, EMG/NCS. Rheum workup for myelopathy was negative. Prior HIV myelopathy was considered but felt less likely due to CD4 counts/viral loads in the past. However more recently has had worsneing CD4 count and viral loads.    CT head w/o con 9/21/23: No CT evidence of acute intracranial pathology. Stable, mild ventriculomegaly, greater than expected for the patient's age.  No evidence for acute hydrocephalus.  CT venogram and angio w/ con 9/21/23: vasculature is patent without evidence of atherosclerosis, stenosis or dissection, arteriovenous malformation, dural venous sinus thrombosis.       NIHSS:  5  Baseline MRS: 3   Not a tenecteplase candidate due to outside window   Not a thrombectomy candidate due to no large vessel occlusion    Impression: Occipital region headache of unclear etiology. No acute findings on CT head imaging (no bleed, evidence of large ischemic stroke, venous sinus thrombosis, PRES). Exam with no neck rigidity at all. Neg kernig/brudinski, no fever, no AMS, no elevated wbc or other signs of CNS/ sepsis to suggest presence of meningitis/ encephalitis. Worsening gait appears to be in setting of back pain and pain associated lower extremity weakness. Low suspicion for cord compression given prior similar symptoms, prior MRI, similar prior motor strength, multiple intact sensory modalities. Has had prior w/u for myopathic and myelopathic and peripheral neuropathic etiologies.      Recommendations:  [ ] ESR, CRP, CK, metal screen, tox screen, crypt antigen serum, a1c, B12, folate, TSH, lipid panel,   [ ] MRI brain w/ and w/o con,  [ ] Will decide with neuro attending if warrants repeat MR cervical, thoracic and lumbar spine w/wo con.    [ ] ID evaluation   [ ] Further workup to be determined pending above  [ ]  PT/OT    - Neuro-checks per routine   - Dysphagia screen.   - fall precautions   - DVT ppx per primary team     Discussed with stroke attending Dr Yemi Dillard regarding decision against candidacy for tenecteplase/ thrombectomy and above recommendations/plan. Delay in note entry/ note updates is due to patient care and [place] downtime but recommendations were discussed with ED at time of eval. Differential diagnosis considered is not limited to that listed above. Non-neurologic findings seen on imaging to be worked up per primary team if applicable. Recommendations will be finalized/amended once signed by attending.  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 s/p influenza vaccine  who presents to ED for headaches. He states LKW 9/20/23 Wed 1 AM when he went to bed. Woke up 9/20/23 Wed 5:30AM with occipital region headache. Has also has had subjective feeling his neck was stiff, lower back hurting. At baseline due to GBS he does have difficulty ambulating but still able to ambulate. Now due to his symptoms he is having difficulty ambulating independently without assistive devices. Of note per chart review, he has been seen multiple times at multiple [facility] for fluctuating/ worsening LE weakness, urinary retention/ and sometimes incontinence, saddle anesthesia. Seen by neuro on multiple times (last in 7/5- [place]) and has had had multiple spine MRIs without cord/ cauda equina compression/ abnormal lesion or enhancement, LP in 2021, EMG/NCS. Rheum workup for myelopathy was negative. Prior HIV myelopathy was considered but felt less likely due to CD4 counts/viral loads in the past. However more recently has had worsneing CD4 count and viral loads.    CT head w/o con 9/21/23: No CT evidence of acute intracranial pathology. Stable, mild ventriculomegaly, greater than expected for the patient's age.  No evidence for acute hydrocephalus.  CT venogram and angio w/ con 9/21/23: vasculature is patent without evidence of atherosclerosis, stenosis or dissection, arteriovenous malformation, dural venous sinus thrombosis.       NIHSS:  5  Baseline MRS: 3   Not a tenecteplase candidate due to outside window   Not a thrombectomy candidate due to no large vessel occlusion    Impression: Occipital region headache of unclear etiology, possibly primary headache. No acute findings on CT head imaging (no bleed, evidence of large ischemic stroke, venous sinus thrombosis, PRES). Exam with no neck rigidity at all. Neg kernig/brudinski, no fever, no AMS, no elevated wbc or other signs of CNS/ sepsis to suggest presence of meningitis/ encephalitis. Worsening gait appears to be in setting of back pain and pain associated lower extremity weakness. Low suspicion for cord compression given prior similar symptoms, prior MRI, similar prior motor strength, multiple intact sensory modalities. Less suspicious for GBS based on exam, reflexes. Has had prior w/u for myopathic and myelopathic and peripheral neuropathic etiologies.      Recommendations:  [ ] ESR, CRP, CK, metal screen, tox screen, crypt antigen serum, a1c, B12, folate, TSH, lipid panel,   [ ] MRI brain w/ and w/o con, eval for inflammatory/ infectious etiologies  [ ] Will decide with neuro attending if warrants repeat MR cervical, thoracic and lumbar spine w/wo con.    [ ] ID evaluation   [ ] Further workup to be determined pending above  [ ]  PT/OT    - Neuro-checks per routine   - Dysphagia screen.   - fall precautions   - DVT ppx per primary team     Discussed with stroke attending Dr Yemi Dillard regarding decision against candidacy for tenecteplase/ thrombectomy and above recommendations/plan. Delay in note entry/ note updates is due to patient care and [place] downtime but recommendations were discussed with ED at time of eval. Differential diagnosis considered is not limited to that listed above. Non-neurologic findings seen on imaging to be worked up per primary team if applicable. Recommendations will be finalized/amended once signed by attending.  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 s/p influenza vaccine  who presents to ED for headaches. He states LKW 9/20/23 Wed 1 AM when he went to bed. Woke up 9/20/23 Wed 5:30AM with occipital region headache. Has also has had subjective feeling his neck was stiff, lower back hurting. At baseline due to GBS he does have difficulty ambulating but still able to ambulate. Now due to his symptoms he is having difficulty ambulating independently without assistive devices. Of note per chart review, he has been seen multiple times at multiple [facility] for fluctuating/ worsening LE weakness, urinary retention/ and sometimes incontinence, saddle anesthesia. Seen by neuro on multiple times (last in 7/5- [place]) and has had had multiple spine MRIs without cord/ cauda equina compression/ abnormal lesion or enhancement, LP in 2021, EMG/NCS. Rheum workup for myelopathy was negative. Prior HIV myelopathy was considered but felt less likely due to CD4 counts/viral loads in the past. However more recently has had worsneing CD4 count and viral loads.    CT head w/o con 9/21/23: No CT evidence of acute intracranial pathology. Stable, mild ventriculomegaly, greater than expected for the patient's age.  No evidence for acute hydrocephalus.  CT venogram and angio w/ con 9/21/23: vasculature is patent without evidence of atherosclerosis, stenosis or dissection, arteriovenous malformation, dural venous sinus thrombosis.       NIHSS:  5  Baseline MRS: 3   Not a tenecteplase candidate due to outside window   Not a thrombectomy candidate due to no large vessel occlusion    Impression: Occipital region headache of unclear etiology, possibly primary headache. No acute findings on CT head imaging (no bleed, evidence of large ischemic stroke, venous sinus thrombosis, PRES). Exam with no neck rigidity at all. Neg kernig/brudinski, no fever, no AMS, no elevated wbc or other signs of CNS/ sepsis to suggest presence of meningitis/ encephalitis. Worsening gait appears to be in setting of back pain and pain associated lower extremity weakness. Low suspicion for cord compression given prior similar symptoms, prior MRI, similar prior motor strength, multiple intact sensory modalities. Less suspicious for GBS based on exam, reflexes. Has had prior w/u for myopathic and myelopathic and peripheral neuropathic etiologies.      Recommendations:  [ ] ESR, CRP, CK, metal screen, tox screen, crypt antigen serum, a1c, B12, folate, TSH, lipid panel,   [ ] SHAI, dsDNA, sjogren ab,   [ ] MRI brain w/ and w/o con, eval for inflammatory/ infectious etiologies  [ ] Will decide with neuro attending if warrants repeat MR cervical, thoracic and lumbar spine w/wo con.    [ ] ID evaluation   [ ] Further workup to be determined pending above  [ ]  PT/OT    - Neuro-checks per routine   - Dysphagia screen.   - fall precautions   - DVT ppx per primary team     Discussed with stroke attending Dr Yemi Dillard regarding decision against candidacy for tenecteplase/ thrombectomy and above recommendations/plan. Delay in note entry/ note updates is due to patient care and [place] downtime but recommendations were discussed with ED at time of eval. Differential diagnosis considered is not limited to that listed above. Non-neurologic findings seen on imaging to be worked up per primary team if applicable. Recommendations will be finalized/amended once signed by attending.  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 s/p influenza vaccine  who presents to ED for headaches. He states LKW 9/20/23 Wed 1 AM when he went to bed. Woke up 9/20/23 Wed 5:30AM with occipital region headache. Has also has had subjective feeling his neck was stiff, lower back hurting. At baseline due to GBS he does have difficulty ambulating but still able to ambulate. Now due to his symptoms he is having difficulty ambulating independently without assistive devices. Of note per chart review, he has been seen multiple times at multiple [facility] for fluctuating/ worsening LE weakness, urinary retention/ and sometimes incontinence, saddle anesthesia. Seen by neuro on multiple times (last in 7/5- [place]) and has had had multiple spine MRIs without cord/ cauda equina compression/ abnormal lesion or enhancement, LP in 2021, EMG/NCS. Rheum workup for myelopathy was negative. Prior HIV myelopathy was considered but felt less likely due to CD4 counts/viral loads in the past. However more recently has had worsneing CD4 count and viral loads.    CT head w/o con 9/21/23: No CT evidence of acute intracranial pathology. Stable, mild ventriculomegaly, greater than expected for the patient's age.  No evidence for acute hydrocephalus.  CT venogram and angio w/ con 9/21/23: vasculature is patent without evidence of atherosclerosis, stenosis or dissection, arteriovenous malformation, dural venous sinus thrombosis.       NIHSS:  5  Baseline MRS: 3   Not a tenecteplase candidate due to outside window   Not a thrombectomy candidate due to no large vessel occlusion    Impression: Occipital region headache of unclear etiology, possibly primary headache. No acute findings on CT head imaging (no bleed, evidence of large ischemic stroke, venous sinus thrombosis, PRES). Exam with no neck rigidity at all. Neg kernig/brudinski, no fever, no AMS, no elevated wbc or other signs of CNS/ sepsis to suggest presence of meningitis/ encephalitis. Worsening gait appears to be in setting of back pain and pain associated lower extremity weakness. Low suspicion for cord compression given prior similar symptoms, prior MRI, similar prior motor strength, multiple intact sensory modalities. Less suspicious for GBS based on exam, reflexes. Has had prior w/u for myopathic and myelopathic and peripheral neuropathic etiologies.      Recommendations:  [ ] ESR, CRP, CK, metal screen, tox screen, crypt antigen serum, a1c, B12, folate, TSH, lipid panel,   [ ] SHAI, dsDNA, sjogren ab,   [ ] MRI brain w/ and w/o con, eval for inflammatory/ infectious etiologies  [ ] Will decide with neuro attending if warrants repeat MR cervical, thoracic and lumbar spine w/wo con.    [ ] ID evaluation, consider evaluation of adenoid/tonsillar enlargement seen on CTA  [ ] Further workup to be determined pending above  [ ]  PT/OT    - Neuro-checks per routine   - Dysphagia screen.   - fall precautions   - DVT ppx per primary team     Discussed with stroke attending Dr Yemi Dillard regarding decision against candidacy for tenecteplase/ thrombectomy and above recommendations/plan. Delay in note entry/ note updates is due to patient care and [place] downtime but recommendations were discussed with ED at time of eval. Differential diagnosis considered is not limited to that listed above. Non-neurologic findings seen on imaging to be worked up per primary team if applicable. Recommendations will be finalized/amended once signed by attending.

## 2023-09-21 NOTE — H&P ADULT - TIME BILLING
reviewing laboratory data, consultants' recommendations, documentation in [place], performing medically appropriate examinations/evaluations, discussion with patient/family/RN/ACP/Residents and interdisciplinary staff (such as , social workers, etc), counseling patient/family/care giver, ordering medically appropriate medication, tests, or procedures

## 2023-09-21 NOTE — CONSULT NOTE ADULT - SUBJECTIVE AND OBJECTIVE BOX
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 s/p influenza vaccine  who presents to ED for headaches. He states LKW 9/20/23 Wed 1 AM when he went to bed. Woke up 9/20/23 Wed 5:30AM with occipital region headache. Has also has had subjective feeling his neck was stiff, lower back hurting. At baseline due to GBS he does have difficulty ambulating but still able to ambulate. Now due to his symptoms he is having difficulty ambulating independently without assistive devices. Of note per chart review, he has been seen multiple times at multiple [facility] for fluctuating/ worsening LE weakness, urinary retention/ and sometimes incontinence, saddle anesthesia. Seen by neuro on multiple times (last in 7/5- [facility]) and has had had multiple spine MRIs without cord/ cauda equina compression/ abnormal lesion or enhancement, LP in 2021, EMG/NCS. Rheum workup for myelopathy was negative. Prior HIV myelopathy was considered but felt less likely due to CD4 counts/viral loads in the past. However more recently has had worsneing CD4 count and viral loads.     Otherwise no nausea, vomiting, subjective fevers, visual changes (double/blurry vision), speech changes, word finding difficulty, cognitive difficulty, extremity focal weakness except for difficulty walking and subjective distal b/l LE tingling.     wbc 6.04, hgb 12.9, plt 255, trop T <6,    tox 7/5/23: pos cocaine, THC,   HIV detected in past.     ROS:    NIHSS:   preMRS:   ICH:   ABCD2:    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    Asthma    Closed fracture of multiple ribs of right side, initial encounter    Cocaine abuse    Chronic sinusitis    Homeless    GBS (Guillain [place] syndrome)    HIV disease    History of orthopedic surgery  left arm    FAMILY HISTORY:  FH: HIV infection (Mother)    MEDICATIONS   MEDICATIONS  (STANDING):    MEDICATIONS  (PRN):    ALLERGIES/INTOLERANCES:  Allergies  Toradol (Swelling)  Toradol (Anaphylaxis; Swelling)  Ceclor (Unknown)    Intolerances    VITALS & EXAMINATION:  Vital Signs Last 24 Hrs  T(C): 36.8 (21 Sep 2023 00:46), Max: 36.8 (21 Sep 2023 00:46)  T(F): 98.3 (21 Sep 2023 00:46), Max: 98.3 (21 Sep 2023 00:46)  HR: 88 (21 Sep 2023 00:46) (88 - 88)  BP: 134/94 (21 Sep 2023 00:46) (134/94 - 134/94)  BP(mean): --  RR: 16 (21 Sep 2023 00:46) (16 - 16)  SpO2: 100% (21 Sep 2023 00:46) (100% - 100%)    Parameters below as of 21 Sep 2023 00:46  Patient On (Oxygen Delivery Method): room air       Exam:   Gen: NAD, comfortable appearing   Eyes: clear sclera  Resp: breathing regularly  Ext: no catina edema  Neck: no catina stiffness   Kernig/ brudinski negative    MS: awake alert oriented person place situation date time. Follows all commands, simple/ complex  Language: intact speech fluency, repetition, comprehension. No dysarthria.   CN: PERRL (3mm b/l), VFF, no disconjugate gaze, EOMI, CN5 intact b/l, no catina facial asymmetry, shoulder shrug intact b/l, tongue midline, moves side to side.   Motor: no catina abnormal movements noted.   Str: no pronator drift b/l. Str on multiple attempts tested is 5/5 proximally and distally UE (biceps, triceps, , wrist).  Has some slight pain related weakness of b/l shoulders as it precipitates his back pain. LE hip flexion 3/5  bilaterally, difficult to test extension by participation. knee flexion 4/5 R and 4-/5 L, dorsif/plantarlexion 4/5 R, 4-/5 L. Exam appears to fluctuate on several attempts. Appears similar at times documented from prior neuro exams.   Sensation: reported subjective decr b/l LE but has intact temp, vib, proprioception x4 extremities.   reflexes: symmetric L/R  biceps 3+. triceps 3+. BR 3+, knees 3+, ankles 2+  negative hills reflexes  negative pectoral reflexes   no ankle clonus  refused rectal tone  coordination: FNF intact b/l. Difficult to have to HTS.  gait: he initially got up, starting walking with L leg antalgic appearing gait, falling towards L side. Difficult due to pain for him to stand up straight.     LABORATORY:  CBC                       12.9   6.04  )-----------( 255      ( 21 Sep 2023 01:20 )             39.5     Chem 09-21    137  |  98  |  12  ----------------------------<  95  4.0   |  26  |  0.96    Ca    9.3      21 Sep 2023 01:20    TPro  8.4<H>  /  Alb  4.5  /  TBili  0.5  /  DBili  x   /  AST  27  /  ALT  21  /  AlkPhos  69  09-21    LFTs LIVER FUNCTIONS - ( 21 Sep 2023 01:20 )  Alb: 4.5 g/dL / Pro: 8.4 g/dL / ALK PHOS: 69 U/L / ALT: 21 U/L / AST: 27 U/L / GGT: x           Coagulopathy PT/INR - ( 21 Sep 2023 01:20 )   PT: 12.1 sec;   INR: 1.08 ratio         PTT - ( 21 Sep 2023 01:20 )  PTT:33.0 sec  Lipid Panel   A1c   Cardiac enzymes     U/A Urinalysis Basic - ( 21 Sep 2023 01:20 )    Color: x / Appearance: x / SG: x / pH: x  Gluc: 95 mg/dL / Ketone: x  / Bili: x / Urobili: x   Blood: x / Protein: x / Nitrite: x   Leuk Esterase: x / RBC: x / WBC x   Sq Epi: x / Non Sq Epi: x / Bacteria: x      CSF  Other    STUDIES & IMAGING: (EEG, CT, MR, U/S, TTE/GARFIELD):     Neurology Consultation     HPI:    NELSON MITCHELL is a 34yo M 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 s/p influenza vaccine  who presents to ED for headaches. He states LKW 9/20/23 Wed 1 AM when he went to bed. Woke up 9/20/23 Wed 5:30AM with occipital region headache. Has also has had subjective feeling his neck was stiff, lower back hurting. At baseline due to GBS he does have difficulty ambulating but still able to ambulate. Now due to his symptoms he is having difficulty ambulating independently without assistive devices. Of note per chart review, he has been seen multiple times at multiple [facility] for fluctuating/ worsening LE weakness, urinary retention/ and sometimes incontinence, saddle anesthesia. Seen by neuro on multiple times (last in 7/5- [place]) and has had had multiple spine MRIs without cord/ cauda equina compression/ abnormal lesion or enhancement, LP in 2021, EMG/NCS. Rheum workup for myelopathy was negative. Prior HIV myelopathy was considered but felt less likely due to CD4 counts/viral loads in the past. However more recently has had worsneing CD4 count and viral loads.     Otherwise no nausea, vomiting, subjective fevers, visual changes (double/blurry vision), speech changes, word finding difficulty, cognitive difficulty, extremity focal weakness except for difficulty walking and subjective distal b/l LE tingling.     wbc 6.04, hgb 12.9, plt 255, trop T <6,    tox 7/5/23: pos cocaine, THC,   HIV detected in past.     ROS:    NIHSS:   preMRS:   ICH:   ABCD2:    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    Asthma    Closed fracture of multiple ribs of right side, initial encounter    Cocaine abuse    Chronic sinusitis    Homeless    GBS (Guillain [place] syndrome)    HIV disease    History of orthopedic surgery  left arm    FAMILY HISTORY:  FH: HIV infection (Mother)    MEDICATIONS   MEDICATIONS  (STANDING):    MEDICATIONS  (PRN):    ALLERGIES/INTOLERANCES:  Allergies  Toradol (Swelling)  Toradol (Anaphylaxis; Swelling)  Ceclor (Unknown)    Intolerances    VITALS & EXAMINATION:  Vital Signs Last 24 Hrs  T(C): 36.8 (21 Sep 2023 00:46), Max: 36.8 (21 Sep 2023 00:46)  T(F): 98.3 (21 Sep 2023 00:46), Max: 98.3 (21 Sep 2023 00:46)  HR: 88 (21 Sep 2023 00:46) (88 - 88)  BP: 134/94 (21 Sep 2023 00:46) (134/94 - 134/94)  BP(mean): --  RR: 16 (21 Sep 2023 00:46) (16 - 16)  SpO2: 100% (21 Sep 2023 00:46) (100% - 100%)    Parameters below as of 21 Sep 2023 00:46  Patient On (Oxygen Delivery Method): room air       Exam:   Gen: NAD, comfortable appearing   Eyes: clear sclera  Resp: breathing regularly  Ext: no catina edema  Neck: no catina stiffness   Kernig/ brudinski negative    MS: awake alert oriented person place situation date time. Follows all commands, simple/ complex  Language: intact speech fluency, repetition, comprehension. No dysarthria.   CN: PERRL (3mm b/l), VFF, no disconjugate gaze, EOMI, CN5 intact b/l, no catina facial asymmetry, shoulder shrug intact b/l, tongue midline, moves side to side.   Motor: no catina abnormal movements noted.   Str: no pronator drift b/l. Str on multiple attempts tested is 5/5 proximally and distally UE (biceps, triceps, , wrist).  Has some slight pain related weakness of b/l shoulders as it precipitates his back pain. LE hip flexion 3/5  bilaterally, difficult to test extension by participation. knee flexion 4/5 R and 4-/5 L, dorsif/plantarlexion 4/5 R, 4-/5 L. Exam appears to fluctuate on several attempts. Appears similar at times documented from prior neuro exams.   Sensation: reported subjective decr b/l LE but has intact temp, vib, proprioception x4 extremities.   reflexes: symmetric L/R  biceps 3+. triceps 3+. BR 3+, knees 3+, ankles 2+, upgoing toes  negative hills reflexes  negative pectoral reflexes   no ankle clonus  refused rectal tone  coordination: FNF intact b/l. Difficult to have to HTS.  gait: he initially got up, starting walking with L leg antalgic appearing gait, falling towards L side. Difficult due to pain for him to stand up straight.     LABORATORY:  CBC                       12.9   6.04  )-----------( 255      ( 21 Sep 2023 01:20 )             39.5     Chem 09-21    137  |  98  |  12  ----------------------------<  95  4.0   |  26  |  0.96    Ca    9.3      21 Sep 2023 01:20    TPro  8.4<H>  /  Alb  4.5  /  TBili  0.5  /  DBili  x   /  AST  27  /  ALT  21  /  AlkPhos  69  09-21    LFTs LIVER FUNCTIONS - ( 21 Sep 2023 01:20 )  Alb: 4.5 g/dL / Pro: 8.4 g/dL / ALK PHOS: 69 U/L / ALT: 21 U/L / AST: 27 U/L / GGT: x           Coagulopathy PT/INR - ( 21 Sep 2023 01:20 )   PT: 12.1 sec;   INR: 1.08 ratio         PTT - ( 21 Sep 2023 01:20 )  PTT:33.0 sec  Lipid Panel   A1c   Cardiac enzymes     U/A Urinalysis Basic - ( 21 Sep 2023 01:20 )    Color: x / Appearance: x / SG: x / pH: x  Gluc: 95 mg/dL / Ketone: x  / Bili: x / Urobili: x   Blood: x / Protein: x / Nitrite: x   Leuk Esterase: x / RBC: x / WBC x   Sq Epi: x / Non Sq Epi: x / Bacteria: x      CSF  Other    STUDIES & IMAGING: (EEG, CT, MR, U/S, TTE/GARFIELD):     Neurology Consultation     HPI:    NELSON IMTCHELL is a 32yo M 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 s/p influenza vaccine  who presents to ED for headaches. He states LKW 9/20/23 Wed 1 AM when he went to bed. Woke up 9/20/23 Wed 5:30AM with occipital region headache. Has also has had subjective feeling his neck was stiff, lower back hurting. At baseline due to GBS he does have difficulty ambulating but still able to ambulate. Now due to his symptoms he is having difficulty ambulating independently without assistive devices. Of note per chart review, he has been seen multiple times at multiple [facility] for fluctuating/ worsening LE weakness, urinary retention/ and sometimes incontinence, saddle anesthesia. Seen by neuro on multiple times (last in 7/5- [place]) and has had had multiple spine MRIs without cord/ cauda equina compression/ abnormal lesion or enhancement, LP in 2021, EMG/NCS. Rheum workup for myelopathy was negative. Prior HIV myelopathy was considered but felt less likely due to CD4 counts/viral loads in the past. However more recently has had worsening CD4 count and viral loads.     Otherwise no nausea, vomiting, subjective fevers, visual changes (double/blurry vision), speech changes, word finding difficulty, cognitive difficulty, extremity focal weakness except for difficulty walking and subjective distal b/l LE tingling.     wbc 6.04, hgb 12.9, plt 255, trop T <6,    tox 7/5/23: pos cocaine, THC,   HIV detected in past.   Prior labs also: B12 349, folate 12.9, 157 (6/2023), SHAI 1:160 (8/2022), a-toco 9.4, htlv neg (8/2022)  EMG per prior neuro notes (by Dr Velázquez) reportedly wnl.    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    Asthma    Closed fracture of multiple ribs of right side, initial encounter    Cocaine abuse    Chronic sinusitis    Homeless    GBS (Guillain [place] syndrome)    HIV disease    History of orthopedic surgery  left arm    FAMILY HISTORY:  FH: HIV infection (Mother)    MEDICATIONS   MEDICATIONS  (STANDING):    MEDICATIONS  (PRN):    ALLERGIES/INTOLERANCES:  Allergies  Toradol (Swelling)  Toradol (Anaphylaxis; Swelling)  Ceclor (Unknown)    Intolerances    VITALS & EXAMINATION:  Vital Signs Last 24 Hrs  T(C): 36.8 (21 Sep 2023 00:46), Max: 36.8 (21 Sep 2023 00:46)  T(F): 98.3 (21 Sep 2023 00:46), Max: 98.3 (21 Sep 2023 00:46)  HR: 88 (21 Sep 2023 00:46) (88 - 88)  BP: 134/94 (21 Sep 2023 00:46) (134/94 - 134/94)  BP(mean): --  RR: 16 (21 Sep 2023 00:46) (16 - 16)  SpO2: 100% (21 Sep 2023 00:46) (100% - 100%)    Parameters below as of 21 Sep 2023 00:46  Patient On (Oxygen Delivery Method): room air       Exam:   Gen: NAD, comfortable appearing   Eyes: clear sclera  Resp: breathing regularly  Ext: no catina edema  Neck: no catina stiffness   Kernig/ brudinski negative    MS: awake alert oriented person place situation date time. Follows all commands, simple/ complex  Language: intact speech fluency, repetition, comprehension. No dysarthria.   CN: PERRL (3mm b/l), VFF, no disconjugate gaze, EOMI, CN5 intact b/l, no catina facial asymmetry, shoulder shrug intact b/l, tongue midline, moves side to side.   Motor: no catina abnormal movements noted.   Str: no pronator drift b/l. Str on multiple attempts tested is 5/5 proximally and distally UE (biceps, triceps, , wrist).  Has some slight pain related weakness of b/l shoulders as it precipitates his back pain. LE hip flexion 3/5  bilaterally, difficult to test extension by participation. knee flexion 4/5 R and 4-/5 L, dorsif/plantarlexion 4/5 R, 4-/5 L. Exam appears to fluctuate on several attempts. Appears similar at times documented from prior neuro exams.   Sensation: reported subjective decr b/l LE but has intact temp, vib, proprioception x4 extremities.   reflexes: symmetric L/R  biceps 3+. triceps 3+. BR 3+, knees 3+, ankles 2+, upgoing toes  negative hills reflexes  negative pectoral reflexes   no ankle clonus  refused rectal tone  coordination: FNF intact b/l. Difficult to have to HTS.  gait: he initially got up, starting walking with L leg antalgic appearing gait, falling towards L side. Difficult due to pain for him to stand up straight.     LABORATORY:  CBC                       12.9   6.04  )-----------( 255      ( 21 Sep 2023 01:20 )             39.5     Chem 09-21    137  |  98  |  12  ----------------------------<  95  4.0   |  26  |  0.96    Ca    9.3      21 Sep 2023 01:20    TPro  8.4<H>  /  Alb  4.5  /  TBili  0.5  /  DBili  x   /  AST  27  /  ALT  21  /  AlkPhos  69  09-21    LFTs LIVER FUNCTIONS - ( 21 Sep 2023 01:20 )  Alb: 4.5 g/dL / Pro: 8.4 g/dL / ALK PHOS: 69 U/L / ALT: 21 U/L / AST: 27 U/L / GGT: x           Coagulopathy PT/INR - ( 21 Sep 2023 01:20 )   PT: 12.1 sec;   INR: 1.08 ratio         PTT - ( 21 Sep 2023 01:20 )  PTT:33.0 sec  Lipid Panel   A1c   Cardiac enzymes     U/A Urinalysis Basic - ( 21 Sep 2023 01:20 )    Color: x / Appearance: x / SG: x / pH: x  Gluc: 95 mg/dL / Ketone: x  / Bili: x / Urobili: x   Blood: x / Protein: x / Nitrite: x   Leuk Esterase: x / RBC: x / WBC x   Sq Epi: x / Non Sq Epi: x / Bacteria: x      CSF  Other    STUDIES & IMAGING: (EEG, CT, MR, U/S, TTE/GARFIELD):

## 2023-09-21 NOTE — H&P ADULT - PROBLEM SELECTOR PLAN 1
--Pt w/ headaches, neck stiffness, gait instability  --Unclear etiology though lower suspicion for infection at this time given physical exam. Lower c/f meningitis/encephalitis  --CT Head w/o acute intracranial pathology  --CTA H/N w/ enlarged adenoids, mildly enlarged palatine/lingual tonsils, otherwise w/o occlusive dx  --ESR/CRP elevated  --Obtain CK, Utox, metal screen, crypt antigen, b12, Folate, TSH, Lipid panel , SHAI, dsDNA, sjogren ab,   --Appreciate neuro recs

## 2023-09-22 LAB
4/8 RATIO: 0.3 RATIO — LOW (ref 0.9–3.6)
A1C WITH ESTIMATED AVERAGE GLUCOSE RESULT: 4.5 % — SIGNIFICANT CHANGE UP (ref 4–5.6)
ABS CD8: 920 CELLS/UL — HIGH (ref 142–740)
ANION GAP SERPL CALC-SCNC: 13 MMOL/L — SIGNIFICANT CHANGE UP (ref 7–14)
BUN SERPL-MCNC: 16 MG/DL — SIGNIFICANT CHANGE UP (ref 7–23)
CALCIUM SERPL-MCNC: 8.9 MG/DL — SIGNIFICANT CHANGE UP (ref 8.4–10.5)
CD16+CD56+ CELLS NFR BLD: 6 % — SIGNIFICANT CHANGE UP (ref 5–23)
CD16+CD56+ CELLS NFR SPEC: 96 CELLS/UL — SIGNIFICANT CHANGE UP (ref 71–410)
CD19 BLASTS SPEC-ACNC: 15 % — SIGNIFICANT CHANGE UP (ref 6–24)
CD19 BLASTS SPEC-ACNC: 238 CELLS/UL — SIGNIFICANT CHANGE UP (ref 84–469)
CD3 BLASTS SPEC-ACNC: 1258 CELLS/UL — SIGNIFICANT CHANGE UP (ref 672–1870)
CD3 BLASTS SPEC-ACNC: 77 % — SIGNIFICANT CHANGE UP (ref 59–83)
CD4 %: 17 % — LOW (ref 30–62)
CD8 %: 56 % — HIGH (ref 12–36)
CHLORIDE SERPL-SCNC: 100 MMOL/L — SIGNIFICANT CHANGE UP (ref 98–107)
CHOLEST SERPL-MCNC: 194 MG/DL — SIGNIFICANT CHANGE UP
CK SERPL-CCNC: 214 U/L — HIGH (ref 30–200)
CO2 SERPL-SCNC: 23 MMOL/L — SIGNIFICANT CHANGE UP (ref 22–31)
CREAT SERPL-MCNC: 0.92 MG/DL — SIGNIFICANT CHANGE UP (ref 0.5–1.3)
CRYPTOC AG FLD QL: NEGATIVE — SIGNIFICANT CHANGE UP
DSDNA AB FLD-ACNC: <0.2 AI — SIGNIFICANT CHANGE UP
EGFR: 112 ML/MIN/1.73M2 — SIGNIFICANT CHANGE UP
ENA SS-A AB FLD IA-ACNC: <0.2 AI — SIGNIFICANT CHANGE UP
ESTIMATED AVERAGE GLUCOSE: 82 — SIGNIFICANT CHANGE UP
FOLATE SERPL-MCNC: 10 NG/ML — SIGNIFICANT CHANGE UP (ref 3.1–17.5)
GLUCOSE SERPL-MCNC: 87 MG/DL — SIGNIFICANT CHANGE UP (ref 70–99)
HCT VFR BLD CALC: 40.6 % — SIGNIFICANT CHANGE UP (ref 39–50)
HDLC SERPL-MCNC: 42 MG/DL — SIGNIFICANT CHANGE UP
HGB BLD-MCNC: 13.1 G/DL — SIGNIFICANT CHANGE UP (ref 13–17)
HIV-1 VIRAL LOAD RESULT: ABNORMAL
HIV1 RNA # SERPL NAA+PROBE: 220 — SIGNIFICANT CHANGE UP
HIV1 RNA SER-IMP: SIGNIFICANT CHANGE UP
HIV1 RNA SERPL NAA+PROBE-ACNC: ABNORMAL
HIV1 RNA SERPL NAA+PROBE-LOG#: 2.34 — SIGNIFICANT CHANGE UP
LIPID PNL WITH DIRECT LDL SERPL: 134 MG/DL — HIGH
MAGNESIUM SERPL-MCNC: 1.5 MG/DL — LOW (ref 1.6–2.6)
MCHC RBC-ENTMCNC: 28.7 PG — SIGNIFICANT CHANGE UP (ref 27–34)
MCHC RBC-ENTMCNC: 32.3 GM/DL — SIGNIFICANT CHANGE UP (ref 32–36)
MCV RBC AUTO: 89 FL — SIGNIFICANT CHANGE UP (ref 80–100)
NON HDL CHOLESTEROL: 152 MG/DL — HIGH
NRBC # BLD: 0 /100 WBCS — SIGNIFICANT CHANGE UP (ref 0–0)
NRBC # FLD: 0 K/UL — SIGNIFICANT CHANGE UP (ref 0–0)
PHOSPHATE SERPL-MCNC: 3.8 MG/DL — SIGNIFICANT CHANGE UP (ref 2.5–4.5)
PLATELET # BLD AUTO: 255 K/UL — SIGNIFICANT CHANGE UP (ref 150–400)
POTASSIUM SERPL-MCNC: 4.1 MMOL/L — SIGNIFICANT CHANGE UP (ref 3.5–5.3)
POTASSIUM SERPL-SCNC: 4.1 MMOL/L — SIGNIFICANT CHANGE UP (ref 3.5–5.3)
RBC # BLD: 4.56 M/UL — SIGNIFICANT CHANGE UP (ref 4.2–5.8)
RBC # FLD: 12.4 % — SIGNIFICANT CHANGE UP (ref 10.3–14.5)
SODIUM SERPL-SCNC: 136 MMOL/L — SIGNIFICANT CHANGE UP (ref 135–145)
T-CELL CD4 SUBSET PNL BLD: 276 CELLS/UL — LOW (ref 489–1457)
TRIGL SERPL-MCNC: 90 MG/DL — SIGNIFICANT CHANGE UP
TSH SERPL-MCNC: 1.01 UIU/ML — SIGNIFICANT CHANGE UP (ref 0.27–4.2)
VIT B12 SERPL-MCNC: 583 PG/ML — SIGNIFICANT CHANGE UP (ref 200–900)
WBC # BLD: 2.86 K/UL — LOW (ref 3.8–10.5)
WBC # FLD AUTO: 2.86 K/UL — LOW (ref 3.8–10.5)

## 2023-09-22 PROCEDURE — 99232 SBSQ HOSP IP/OBS MODERATE 35: CPT

## 2023-09-22 RX ORDER — DIAZEPAM 5 MG
5 TABLET ORAL ONCE
Refills: 0 | Status: DISCONTINUED | OUTPATIENT
Start: 2023-09-22 | End: 2023-09-23

## 2023-09-22 RX ORDER — MAGNESIUM OXIDE 400 MG ORAL TABLET 241.3 MG
400 TABLET ORAL
Refills: 0 | Status: COMPLETED | OUTPATIENT
Start: 2023-09-22 | End: 2023-09-24

## 2023-09-22 RX ADMIN — QUETIAPINE FUMARATE 300 MILLIGRAM(S): 200 TABLET, FILM COATED ORAL at 21:47

## 2023-09-22 RX ADMIN — MAGNESIUM OXIDE 400 MG ORAL TABLET 400 MILLIGRAM(S): 241.3 TABLET ORAL at 18:58

## 2023-09-22 RX ADMIN — Medication 1 TABLET(S): at 18:30

## 2023-09-22 RX ADMIN — OXYCODONE HYDROCHLORIDE 5 MILLIGRAM(S): 5 TABLET ORAL at 15:44

## 2023-09-22 RX ADMIN — Medication 100 MILLIGRAM(S): at 18:30

## 2023-09-22 RX ADMIN — PREGABALIN 1000 MICROGRAM(S): 225 CAPSULE ORAL at 12:10

## 2023-09-22 RX ADMIN — QUETIAPINE FUMARATE 100 MILLIGRAM(S): 200 TABLET, FILM COATED ORAL at 12:10

## 2023-09-22 RX ADMIN — Medication 10 MILLIGRAM(S): at 05:43

## 2023-09-22 RX ADMIN — BICTEGRAVIR SODIUM, EMTRICITABINE, AND TENOFOVIR ALAFENAMIDE FUMARATE 1 TABLET(S): 30; 120; 15 TABLET ORAL at 14:08

## 2023-09-22 RX ADMIN — Medication 10 MILLIGRAM(S): at 13:21

## 2023-09-22 RX ADMIN — CYCLOBENZAPRINE HYDROCHLORIDE 10 MILLIGRAM(S): 10 TABLET, FILM COATED ORAL at 12:10

## 2023-09-22 RX ADMIN — Medication 100 MILLIGRAM(S): at 05:43

## 2023-09-22 RX ADMIN — Medication 10 MILLIGRAM(S): at 21:47

## 2023-09-22 RX ADMIN — Medication 3 MILLIGRAM(S): at 21:47

## 2023-09-22 RX ADMIN — MAGNESIUM OXIDE 400 MG ORAL TABLET 400 MILLIGRAM(S): 241.3 TABLET ORAL at 12:11

## 2023-09-22 NOTE — PHYSICAL THERAPY INITIAL EVALUATION ADULT - GENERAL OBSERVATIONS, REHAB EVAL
Upon arrival, patient semi-supine on ED stretcher in NAD; + telemetry monitoring. HR 77 bpm. Pt left as received with all tubes/lines intact, stretcher rails up, and in NAD.

## 2023-09-22 NOTE — PHYSICAL THERAPY INITIAL EVALUATION ADULT - IMPAIRED TRANSFERS: SIT/STAND, REHAB EVAL
impaired balance/impaired coordination/decreased flexibility/impaired motor control/pain/impaired postural control/impaired sensory feedback/decreased strength

## 2023-09-22 NOTE — PHYSICAL THERAPY INITIAL EVALUATION ADULT - PATIENT PROFILE REVIEW, REHAB EVAL
PT initial evaluation received and chart review completed. Pt agreeable to participate in PT evaluation. Pt cleared by ANDREA Kolb./yes

## 2023-09-22 NOTE — PROGRESS NOTE ADULT - SUBJECTIVE AND OBJECTIVE BOX
Patient is a 33y old  Male who presents with a chief complaint of Gait instability (21 Sep 2023 12:21)      SUBJECTIVE / OVERNIGHT EVENTS:    MEDICATIONS  (STANDING):  baclofen 10 milliGRAM(s) Oral every 8 hours  bictegravir 50 mG/emtricitabine 200 mG/tenofovir alafenamide 25 mG (BIKTARVY) 1 Tablet(s) Oral daily  cyanocobalamin 1000 MICROGram(s) Oral daily  cyclobenzaprine 10 milliGRAM(s) Oral daily  enoxaparin Injectable 40 milliGRAM(s) SubCutaneous every 24 hours  influenza   Vaccine 0.5 milliLiter(s) IntraMuscular once  lidocaine   4% Patch 1 Patch Transdermal every 24 hours  magnesium oxide 400 milliGRAM(s) Oral three times a day with meals  melatonin 3 milliGRAM(s) Oral at bedtime  pregabalin 100 milliGRAM(s) Oral two times a day  QUEtiapine 300 milliGRAM(s) Oral at bedtime  QUEtiapine 100 milliGRAM(s) Oral daily  trimethoprim  160 mG/sulfamethoxazole 800 mG 1 Tablet(s) Oral <User Schedule>    MEDICATIONS  (PRN):  acetaminophen     Tablet .. 650 milliGRAM(s) Oral every 6 hours PRN Mild Pain (1 - 3), Moderate Pain (4 - 6)  oxyCODONE    IR 5 milliGRAM(s) Oral every 6 hours PRN Severe Pain (7 - 10)      Vital Signs Last 24 Hrs  T(C): 36.4 (22 Sep 2023 05:48), Max: 36.7 (21 Sep 2023 13:25)  T(F): 97.6 (22 Sep 2023 05:48), Max: 98.1 (21 Sep 2023 13:25)  HR: 72 (22 Sep 2023 05:48) (60 - 82)  BP: 111/73 (22 Sep 2023 05:48) (111/73 - 130/68)  BP(mean): --  RR: 16 (22 Sep 2023 05:48) (16 - 20)  SpO2: 100% (22 Sep 2023 05:48) (99% - 100%)    Parameters below as of 22 Sep 2023 05:48  Patient On (Oxygen Delivery Method): room air      CAPILLARY BLOOD GLUCOSE        I&O's Summary      PHYSICAL EXAM:  GENERAL: NAD, well-developed  HEAD:  Atraumatic, Normocephalic  EYES: EOMI, PERRLA, conjunctiva and sclera clear  NECK: Supple, No JVD  CHEST/LUNG: Clear to auscultation bilaterally; No wheeze  HEART: Regular rate and rhythm; No murmurs, rubs, or gallops  ABDOMEN: Soft, Nontender, Nondistended; Bowel sounds present  EXTREMITIES:  2+ Peripheral Pulses, No clubbing, cyanosis, or edema  PSYCH: AAOx3  NEUROLOGY: non-focal  SKIN: No rashes or lesions    LABS:                        13.1   2.86  )-----------( 255      ( 22 Sep 2023 06:27 )             40.6     09-22    136  |  100  |  16  ----------------------------<  87  4.1   |  23  |  0.92    Ca    8.9      22 Sep 2023 06:27  Phos  3.8     09-22  Mg     1.50     09-22    TPro  8.4<H>  /  Alb  4.5  /  TBili  0.5  /  DBili  x   /  AST  27  /  ALT  21  /  AlkPhos  69  09-21    PT/INR - ( 21 Sep 2023 01:20 )   PT: 12.1 sec;   INR: 1.08 ratio         PTT - ( 21 Sep 2023 01:20 )  PTT:33.0 sec  CARDIAC MARKERS ( 22 Sep 2023 06:27 )  x     / x     / 214 U/L / x     / x          Urinalysis Basic - ( 22 Sep 2023 06:27 )    Color: x / Appearance: x / SG: x / pH: x  Gluc: 87 mg/dL / Ketone: x  / Bili: x / Urobili: x   Blood: x / Protein: x / Nitrite: x   Leuk Esterase: x / RBC: x / WBC x   Sq Epi: x / Non Sq Epi: x / Bacteria: x        RADIOLOGY & ADDITIONAL TESTS:    Imaging Personally Reviewed:    Consultant(s) Notes Reviewed:      Care Discussed with Consultants/Other Providers:   Patient is a 33y old  Male who presents with a chief complaint of Gait instability (21 Sep 2023 12:21)      SUBJECTIVE / OVERNIGHT EVENTS: patient seen and examined by bedside, , pt says headache has subsided, denies dizziness ,blurry vision , CP, SOB,   Pt says , he walked to the bathroom , but was wobbly     MEDICATIONS  (STANDING):  baclofen 10 milliGRAM(s) Oral every 8 hours  bictegravir 50 mG/emtricitabine 200 mG/tenofovir alafenamide 25 mG (BIKTARVY) 1 Tablet(s) Oral daily  cyanocobalamin 1000 MICROGram(s) Oral daily  cyclobenzaprine 10 milliGRAM(s) Oral daily  enoxaparin Injectable 40 milliGRAM(s) SubCutaneous every 24 hours  influenza   Vaccine 0.5 milliLiter(s) IntraMuscular once  lidocaine   4% Patch 1 Patch Transdermal every 24 hours  magnesium oxide 400 milliGRAM(s) Oral three times a day with meals  melatonin 3 milliGRAM(s) Oral at bedtime  pregabalin 100 milliGRAM(s) Oral two times a day  QUEtiapine 300 milliGRAM(s) Oral at bedtime  QUEtiapine 100 milliGRAM(s) Oral daily  trimethoprim  160 mG/sulfamethoxazole 800 mG 1 Tablet(s) Oral <User Schedule>    MEDICATIONS  (PRN):  acetaminophen     Tablet .. 650 milliGRAM(s) Oral every 6 hours PRN Mild Pain (1 - 3), Moderate Pain (4 - 6)  oxyCODONE    IR 5 milliGRAM(s) Oral every 6 hours PRN Severe Pain (7 - 10)      Vital Signs Last 24 Hrs  T(C): 36.4 (22 Sep 2023 05:48), Max: 36.7 (21 Sep 2023 13:25)  T(F): 97.6 (22 Sep 2023 05:48), Max: 98.1 (21 Sep 2023 13:25)  HR: 72 (22 Sep 2023 05:48) (60 - 82)  BP: 111/73 (22 Sep 2023 05:48) (111/73 - 130/68)  BP(mean): --  RR: 16 (22 Sep 2023 05:48) (16 - 20)  SpO2: 100% (22 Sep 2023 05:48) (99% - 100%)    Parameters below as of 22 Sep 2023 05:48  Patient On (Oxygen Delivery Method): room air      CAPILLARY BLOOD GLUCOSE        I&O's Summary      PHYSICAL EXAM:  GENERAL: NAD,   HEAD:  Atraumatic, Normocephalic  EYES: EOMI, PERRLA, conjunctiva and sclera clear  NECK: Supple, No JVD  CHEST/LUNG: Clear to auscultation bilaterally; No wheeze  HEART: Regular rate and rhythm; No murmurs, rubs, or gallops  ABDOMEN: Soft, Nontender, Nondistended; Bowel sounds present  EXTREMITIES:  2+ Peripheral Pulses, No clubbing, cyanosis, or edema  PSYCH: AAOx3  NEUROLOGY:  +3/5 strength LE B/L , +4/5 upper ext b/l , sensation dec B/L LE.       LABS:                        13.1   2.86  )-----------( 255      ( 22 Sep 2023 06:27 )             40.6     09-22    136  |  100  |  16  ----------------------------<  87  4.1   |  23  |  0.92    Ca    8.9      22 Sep 2023 06:27  Phos  3.8     09-22  Mg     1.50     09-22    TPro  8.4<H>  /  Alb  4.5  /  TBili  0.5  /  DBili  x   /  AST  27  /  ALT  21  /  AlkPhos  69  09-21    PT/INR - ( 21 Sep 2023 01:20 )   PT: 12.1 sec;   INR: 1.08 ratio         PTT - ( 21 Sep 2023 01:20 )  PTT:33.0 sec  CARDIAC MARKERS ( 22 Sep 2023 06:27 )  x     / x     / 214 U/L / x     / x          Urinalysis Basic - ( 22 Sep 2023 06:27 )    Color: x / Appearance: x / SG: x / pH: x  Gluc: 87 mg/dL / Ketone: x  / Bili: x / Urobili: x   Blood: x / Protein: x / Nitrite: x   Leuk Esterase: x / RBC: x / WBC x   Sq Epi: x / Non Sq Epi: x / Bacteria: x        RADIOLOGY & ADDITIONAL TESTS:    Imaging Personally Reviewed:    Consultant(s) Notes Reviewed:  Neurology     Care Discussed with Consultants/Other Providers:

## 2023-09-22 NOTE — PHYSICAL THERAPY INITIAL EVALUATION ADULT - SITTING BALANCE: STATIC
good balance Consent (Scalp)/Introductory Paragraph: The rationale for Mohs was explained to the patient and consent was obtained. The risks, benefits and alternatives to therapy were discussed in detail. Specifically, the risks of changes in hair growth pattern secondary to repair, infection, scarring, bleeding, prolonged wound healing, incomplete removal, allergy to anesthesia, nerve injury and recurrence were addressed. Prior to the procedure, the treatment site was clearly identified and confirmed by the patient. All components of Universal Protocol/PAUSE Rule completed.

## 2023-09-22 NOTE — PROGRESS NOTE ADULT - PROBLEM SELECTOR PLAN 7
DVT: Lovenox  DIET: Regular  DISPO: PT consulted DVT: Lovenox  DIET: Regular  DISPO: PT consulted  plan of care d/w pt and ACP

## 2023-09-22 NOTE — PHYSICAL THERAPY INITIAL EVALUATION ADULT - MANUAL MUSCLE TESTING RESULTS, REHAB EVAL
Right ankle dorsiflexion 3/5; Right quadriceps 3/5; Left ankle dorsiflexion 3/5; Left quadriceps 2+/5

## 2023-09-22 NOTE — PHYSICAL THERAPY INITIAL EVALUATION ADULT - NSPTDISCHREC_GEN_A_CORE
[facility]. Pt able to tolerate three hours of therapy a day. Recommend PM&R consultation to determine eligibility for acute rehab.

## 2023-09-22 NOTE — PROGRESS NOTE ADULT - PROBLEM SELECTOR PLAN 1
--Pt w/ headaches, neck stiffness, gait instability  --Unclear etiology though lower suspicion for infection at this time given physical exam. Lower c/f meningitis/encephalitis  --CT Head w/o acute intracranial pathology  --CTA H/N w/ enlarged adenoids, mildly enlarged palatine/lingual tonsils, otherwise w/o occlusive dx  --ESR/CRP elevated  --Obtain CK, Utox, metal screen, crypt antigen, b12, Folate, TSH, Lipid panel , SHAI, dsDNA, sjogren ab,   --Appreciate neuro recs --Pt w/ headaches, neck stiffness, gait instability  --Unclear etiology though lower suspicion for infection at this time given physical exam. Lower c/f meningitis/encephalitis  --CT Head w/o acute intracranial pathology  --CTA H/N w/ enlarged adenoids, mildly enlarged palatine/lingual tonsils, otherwise w/o occlusive dx  --ESR/CRP elevated  --Obtain CK, Utox, metal screen, crypt antigen, b12, Folate, TSH, Lipid panel , SHAI, dsDNA, sjogren ab,   --Appreciate neuro recs  - will check MRI brain and spine   Neuro rec , -can increase cyclobenzaprine to 5 mg AM / 5 mg mid-day / 10 mg QHS  -can increase pregabalin to 100 mg AM / 150 mg PM x3 days, then if tolerated and if no improvement in pain, increase to pregabalin 150 mg bid

## 2023-09-22 NOTE — PHYSICAL THERAPY INITIAL EVALUATION ADULT - PERTINENT HX OF CURRENT PROBLEM, REHAB EVAL
Pt is a 33 year old male presenting with headaches in occipital region and gait instability associated with photophobia and neck stiffness, worsening back pain and muscle cramps . Of note, pt has been seen multiple times at multiple [facility] (most recent 09/05 at [facility]) for fluctuating/ worsening LE weakness, urinary retention/ and sometimes incontinence, and saddle anesthesia. CT head and angio negative for acute findings. Pt pending MRI of spine.

## 2023-09-22 NOTE — PROGRESS NOTE ADULT - PROBLEM SELECTOR PLAN 4
--c/w home medications  --PRN Tylenol/ oxy   --Lidocaine patch --c/w home medications  --PRN Tylenol/ oxy   --Lidocaine patch  Neuro rec noted , - will check MRI brain and spine   Neuro rec , -can increase cyclobenzaprine to 5 mg AM / 5 mg mid-day / 10 mg QHS  -can increase pregabalin to 100 mg AM / 150 mg PM x3 days, then if tolerated and if no improvement in pain, increase to pregabalin 150 mg bid

## 2023-09-22 NOTE — PHYSICAL THERAPY INITIAL EVALUATION ADULT - GAIT DEVIATIONS NOTED, PT EVAL
forward flexed posture with downward gaze; lack of bilateral hip and knee flexion through swing phase (left greater difficulty); poor limb advancement/decreased cabrera/decreased velocity of limb motion/decreased step length/decreased stride length/decreased weight-shifting ability

## 2023-09-23 LAB
ANION GAP SERPL CALC-SCNC: 9 MMOL/L — SIGNIFICANT CHANGE UP (ref 7–14)
BUN SERPL-MCNC: 12 MG/DL — SIGNIFICANT CHANGE UP (ref 7–23)
CALCIUM SERPL-MCNC: 8.9 MG/DL — SIGNIFICANT CHANGE UP (ref 8.4–10.5)
CHLORIDE SERPL-SCNC: 105 MMOL/L — SIGNIFICANT CHANGE UP (ref 98–107)
CO2 SERPL-SCNC: 24 MMOL/L — SIGNIFICANT CHANGE UP (ref 22–31)
CREAT SERPL-MCNC: 0.85 MG/DL — SIGNIFICANT CHANGE UP (ref 0.5–1.3)
DSDNA AB SER-ACNC: <12 IU/ML — SIGNIFICANT CHANGE UP
EGFR: 117 ML/MIN/1.73M2 — SIGNIFICANT CHANGE UP
GLUCOSE SERPL-MCNC: 97 MG/DL — SIGNIFICANT CHANGE UP (ref 70–99)
HCT VFR BLD CALC: 38.2 % — LOW (ref 39–50)
HGB BLD-MCNC: 12.6 G/DL — LOW (ref 13–17)
MAGNESIUM SERPL-MCNC: 1.7 MG/DL — SIGNIFICANT CHANGE UP (ref 1.6–2.6)
MCHC RBC-ENTMCNC: 29.4 PG — SIGNIFICANT CHANGE UP (ref 27–34)
MCHC RBC-ENTMCNC: 33 GM/DL — SIGNIFICANT CHANGE UP (ref 32–36)
MCV RBC AUTO: 89 FL — SIGNIFICANT CHANGE UP (ref 80–100)
NRBC # BLD: 0 /100 WBCS — SIGNIFICANT CHANGE UP (ref 0–0)
NRBC # FLD: 0 K/UL — SIGNIFICANT CHANGE UP (ref 0–0)
PHOSPHATE SERPL-MCNC: 3 MG/DL — SIGNIFICANT CHANGE UP (ref 2.5–4.5)
PLATELET # BLD AUTO: 243 K/UL — SIGNIFICANT CHANGE UP (ref 150–400)
POTASSIUM SERPL-MCNC: 4 MMOL/L — SIGNIFICANT CHANGE UP (ref 3.5–5.3)
POTASSIUM SERPL-SCNC: 4 MMOL/L — SIGNIFICANT CHANGE UP (ref 3.5–5.3)
RBC # BLD: 4.29 M/UL — SIGNIFICANT CHANGE UP (ref 4.2–5.8)
RBC # FLD: 12.2 % — SIGNIFICANT CHANGE UP (ref 10.3–14.5)
SODIUM SERPL-SCNC: 138 MMOL/L — SIGNIFICANT CHANGE UP (ref 135–145)
WBC # BLD: 3.46 K/UL — LOW (ref 3.8–10.5)
WBC # FLD AUTO: 3.46 K/UL — LOW (ref 3.8–10.5)

## 2023-09-23 PROCEDURE — 99232 SBSQ HOSP IP/OBS MODERATE 35: CPT

## 2023-09-23 RX ORDER — DIAZEPAM 5 MG
5 TABLET ORAL ONCE
Refills: 0 | Status: DISCONTINUED | OUTPATIENT
Start: 2023-09-23 | End: 2023-09-25

## 2023-09-23 RX ORDER — CYCLOBENZAPRINE HYDROCHLORIDE 10 MG/1
5 TABLET, FILM COATED ORAL
Refills: 0 | Status: DISCONTINUED | OUTPATIENT
Start: 2023-09-23 | End: 2023-09-27

## 2023-09-23 RX ORDER — CYCLOBENZAPRINE HYDROCHLORIDE 10 MG/1
10 TABLET, FILM COATED ORAL
Refills: 0 | Status: DISCONTINUED | OUTPATIENT
Start: 2023-09-23 | End: 2023-09-27

## 2023-09-23 RX ADMIN — MAGNESIUM OXIDE 400 MG ORAL TABLET 400 MILLIGRAM(S): 241.3 TABLET ORAL at 12:13

## 2023-09-23 RX ADMIN — Medication 100 MILLIGRAM(S): at 06:12

## 2023-09-23 RX ADMIN — Medication 10 MILLIGRAM(S): at 15:12

## 2023-09-23 RX ADMIN — Medication 150 MILLIGRAM(S): at 22:42

## 2023-09-23 RX ADMIN — MAGNESIUM OXIDE 400 MG ORAL TABLET 400 MILLIGRAM(S): 241.3 TABLET ORAL at 08:52

## 2023-09-23 RX ADMIN — CYCLOBENZAPRINE HYDROCHLORIDE 5 MILLIGRAM(S): 10 TABLET, FILM COATED ORAL at 15:12

## 2023-09-23 RX ADMIN — QUETIAPINE FUMARATE 300 MILLIGRAM(S): 200 TABLET, FILM COATED ORAL at 22:42

## 2023-09-23 RX ADMIN — Medication 3 MILLIGRAM(S): at 22:42

## 2023-09-23 RX ADMIN — Medication 0.25 MILLIGRAM(S): at 09:23

## 2023-09-23 RX ADMIN — Medication 10 MILLIGRAM(S): at 22:43

## 2023-09-23 RX ADMIN — Medication 10 MILLIGRAM(S): at 06:12

## 2023-09-23 RX ADMIN — OXYCODONE HYDROCHLORIDE 5 MILLIGRAM(S): 5 TABLET ORAL at 12:22

## 2023-09-23 RX ADMIN — CYCLOBENZAPRINE HYDROCHLORIDE 10 MILLIGRAM(S): 10 TABLET, FILM COATED ORAL at 22:42

## 2023-09-23 RX ADMIN — MAGNESIUM OXIDE 400 MG ORAL TABLET 400 MILLIGRAM(S): 241.3 TABLET ORAL at 18:53

## 2023-09-23 RX ADMIN — PREGABALIN 1000 MICROGRAM(S): 225 CAPSULE ORAL at 12:14

## 2023-09-23 RX ADMIN — Medication 100 MILLIGRAM(S): at 12:13

## 2023-09-23 RX ADMIN — BICTEGRAVIR SODIUM, EMTRICITABINE, AND TENOFOVIR ALAFENAMIDE FUMARATE 1 TABLET(S): 30; 120; 15 TABLET ORAL at 12:14

## 2023-09-23 RX ADMIN — QUETIAPINE FUMARATE 100 MILLIGRAM(S): 200 TABLET, FILM COATED ORAL at 12:14

## 2023-09-23 NOTE — PROGRESS NOTE ADULT - PROBLEM SELECTOR PLAN 4
--c/w home medications  --PRN Tylenol/ oxy   --Lidocaine patch  Neuro rec noted , - will check MRI brain and spine   Neuro rec , -can increase cyclobenzaprine to 5 mg AM / 5 mg mid-day / 10 mg QHS  -can increase pregabalin to 100 mg AM / 150 mg PM x3 days, then if tolerated and if no improvement in pain, increase to pregabalin 150 mg bid

## 2023-09-23 NOTE — PROGRESS NOTE ADULT - SUBJECTIVE AND OBJECTIVE BOX
Patient is a 33y old  Male who presents with a chief complaint of Gait instability (22 Sep 2023 09:33)       SUBJECTIVE / OVERNIGHT EVENTS: patient seen and examined by bedside, pt says he feels about the same , pt did not get MRI as he wants higher dose of ativan, pt requesting for ativan 5 mg IV prior to MRI as he is claustrophobic     MEDICATIONS  (STANDING):  baclofen 10 milliGRAM(s) Oral every 8 hours  bictegravir 50 mG/emtricitabine 200 mG/tenofovir alafenamide 25 mG (BIKTARVY) 1 Tablet(s) Oral daily  cyanocobalamin 1000 MICROGram(s) Oral daily  cyclobenzaprine 10 milliGRAM(s) Oral <User Schedule>  cyclobenzaprine 5 milliGRAM(s) Oral <User Schedule>  enoxaparin Injectable 40 milliGRAM(s) SubCutaneous every 24 hours  influenza   Vaccine 0.5 milliLiter(s) IntraMuscular once  lidocaine   4% Patch 1 Patch Transdermal every 24 hours  magnesium oxide 400 milliGRAM(s) Oral three times a day with meals  melatonin 3 milliGRAM(s) Oral at bedtime  pregabalin 150 milliGRAM(s) Oral at bedtime  pregabalin 100 milliGRAM(s) Oral daily  QUEtiapine 100 milliGRAM(s) Oral daily  QUEtiapine 300 milliGRAM(s) Oral at bedtime  trimethoprim  160 mG/sulfamethoxazole 800 mG 1 Tablet(s) Oral <User Schedule>    MEDICATIONS  (PRN):  acetaminophen     Tablet .. 650 milliGRAM(s) Oral every 6 hours PRN Mild Pain (1 - 3), Moderate Pain (4 - 6)  diazepam    Tablet 5 milliGRAM(s) Oral once PRN on call for MRI  oxyCODONE    IR 5 milliGRAM(s) Oral every 6 hours PRN Severe Pain (7 - 10)      Vital Signs Last 24 Hrs  T(C): 36.9 (23 Sep 2023 12:20), Max: 36.9 (23 Sep 2023 06:07)  T(F): 98.4 (23 Sep 2023 12:20), Max: 98.4 (23 Sep 2023 06:07)  HR: 84 (23 Sep 2023 12:20) (70 - 88)  BP: 119/74 (23 Sep 2023 12:20) (109/61 - 129/76)  BP(mean): --  RR: 17 (23 Sep 2023 12:20) (16 - 18)  SpO2: 100% (23 Sep 2023 12:20) (99% - 100%)    Parameters below as of 23 Sep 2023 12:20  Patient On (Oxygen Delivery Method): room air      CAPILLARY BLOOD GLUCOSE            PHYSICAL EXAM:  GENERAL: NAD,   HEAD:  Atraumatic, Normocephalic  EYES: EOMI, PERRLA, conjunctiva and sclera clear  NECK: Supple, No JVD  CHEST/LUNG: Clear to auscultation bilaterally; No wheeze  HEART: Regular rate and rhythm; No murmurs, rubs, or gallops  ABDOMEN: Soft, Nontender, Nondistended; Bowel sounds present  EXTREMITIES:  2+ Peripheral Pulses, No clubbing, cyanosis, or edema  PSYCH: AAOx3  NEUROLOGY:  2/5 strength LLE 3-4/5 in RLE , +4/5 upper ext b/l , sensation dec B/L LE.       LABS:                        12.6   3.46  )-----------( 243      ( 23 Sep 2023 06:12 )             38.2     09-23    138  |  105  |  12  ----------------------------<  97  4.0   |  24  |  0.85    Ca    8.9      23 Sep 2023 06:12  Phos  3.0     09-23  Mg     1.70     09-23        CARDIAC MARKERS ( 22 Sep 2023 06:27 )  x     / x     / 214 U/L / x     / x          Urinalysis Basic - ( 23 Sep 2023 06:12 )    Color: x / Appearance: x / SG: x / pH: x  Gluc: 97 mg/dL / Ketone: x  / Bili: x / Urobili: x   Blood: x / Protein: x / Nitrite: x   Leuk Esterase: x / RBC: x / WBC x   Sq Epi: x / Non Sq Epi: x / Bacteria: x        RADIOLOGY & ADDITIONAL TESTS:    Imaging Personally Reviewed:    Consultant(s) Notes Reviewed:      Care Discussed with Consultants/Other Providers:   Patient is a 33y old  Male who presents with a chief complaint of Gait instability (22 Sep 2023 09:33)       SUBJECTIVE / OVERNIGHT EVENTS: patient seen and examined by bedside at 11:20 Am , pt says he feels about the same , pt did not get MRI as he wants higher dose of ativan, pt requesting for ativan 5 mg IV prior to MRI as he is claustrophobic     MEDICATIONS  (STANDING):  baclofen 10 milliGRAM(s) Oral every 8 hours  bictegravir 50 mG/emtricitabine 200 mG/tenofovir alafenamide 25 mG (BIKTARVY) 1 Tablet(s) Oral daily  cyanocobalamin 1000 MICROGram(s) Oral daily  cyclobenzaprine 10 milliGRAM(s) Oral <User Schedule>  cyclobenzaprine 5 milliGRAM(s) Oral <User Schedule>  enoxaparin Injectable 40 milliGRAM(s) SubCutaneous every 24 hours  influenza   Vaccine 0.5 milliLiter(s) IntraMuscular once  lidocaine   4% Patch 1 Patch Transdermal every 24 hours  magnesium oxide 400 milliGRAM(s) Oral three times a day with meals  melatonin 3 milliGRAM(s) Oral at bedtime  pregabalin 150 milliGRAM(s) Oral at bedtime  pregabalin 100 milliGRAM(s) Oral daily  QUEtiapine 100 milliGRAM(s) Oral daily  QUEtiapine 300 milliGRAM(s) Oral at bedtime  trimethoprim  160 mG/sulfamethoxazole 800 mG 1 Tablet(s) Oral <User Schedule>    MEDICATIONS  (PRN):  acetaminophen     Tablet .. 650 milliGRAM(s) Oral every 6 hours PRN Mild Pain (1 - 3), Moderate Pain (4 - 6)  diazepam    Tablet 5 milliGRAM(s) Oral once PRN on call for MRI  oxyCODONE    IR 5 milliGRAM(s) Oral every 6 hours PRN Severe Pain (7 - 10)      Vital Signs Last 24 Hrs  T(C): 36.9 (23 Sep 2023 12:20), Max: 36.9 (23 Sep 2023 06:07)  T(F): 98.4 (23 Sep 2023 12:20), Max: 98.4 (23 Sep 2023 06:07)  HR: 84 (23 Sep 2023 12:20) (70 - 88)  BP: 119/74 (23 Sep 2023 12:20) (109/61 - 129/76)  BP(mean): --  RR: 17 (23 Sep 2023 12:20) (16 - 18)  SpO2: 100% (23 Sep 2023 12:20) (99% - 100%)    Parameters below as of 23 Sep 2023 12:20  Patient On (Oxygen Delivery Method): room air      CAPILLARY BLOOD GLUCOSE            PHYSICAL EXAM:  GENERAL: NAD,   HEAD:  Atraumatic, Normocephalic  EYES: EOMI, PERRLA, conjunctiva and sclera clear  NECK: Supple, No JVD  CHEST/LUNG: Clear to auscultation bilaterally; No wheeze  HEART: Regular rate and rhythm; No murmurs, rubs, or gallops  ABDOMEN: Soft, Nontender, Nondistended; Bowel sounds present  EXTREMITIES:  2+ Peripheral Pulses, No clubbing, cyanosis, or edema  PSYCH: AAOx3  NEUROLOGY:  2/5 strength LLE 3-4/5 in RLE , +4/5 upper ext b/l , sensation dec B/L LE.       LABS:                        12.6   3.46  )-----------( 243      ( 23 Sep 2023 06:12 )             38.2     09-23    138  |  105  |  12  ----------------------------<  97  4.0   |  24  |  0.85    Ca    8.9      23 Sep 2023 06:12  Phos  3.0     09-23  Mg     1.70     09-23        CARDIAC MARKERS ( 22 Sep 2023 06:27 )  x     / x     / 214 U/L / x     / x          Urinalysis Basic - ( 23 Sep 2023 06:12 )    Color: x / Appearance: x / SG: x / pH: x  Gluc: 97 mg/dL / Ketone: x  / Bili: x / Urobili: x   Blood: x / Protein: x / Nitrite: x   Leuk Esterase: x / RBC: x / WBC x   Sq Epi: x / Non Sq Epi: x / Bacteria: x        RADIOLOGY & ADDITIONAL TESTS:    Imaging Personally Reviewed:    Consultant(s) Notes Reviewed:      Care Discussed with Consultants/Other Providers:

## 2023-09-24 LAB — ANA TITR SER: NEGATIVE — SIGNIFICANT CHANGE UP

## 2023-09-24 PROCEDURE — 99232 SBSQ HOSP IP/OBS MODERATE 35: CPT

## 2023-09-24 RX ADMIN — Medication 3 MILLIGRAM(S): at 22:10

## 2023-09-24 RX ADMIN — Medication 100 MILLIGRAM(S): at 15:36

## 2023-09-24 RX ADMIN — CYCLOBENZAPRINE HYDROCHLORIDE 5 MILLIGRAM(S): 10 TABLET, FILM COATED ORAL at 06:06

## 2023-09-24 RX ADMIN — QUETIAPINE FUMARATE 100 MILLIGRAM(S): 200 TABLET, FILM COATED ORAL at 15:42

## 2023-09-24 RX ADMIN — MAGNESIUM OXIDE 400 MG ORAL TABLET 400 MILLIGRAM(S): 241.3 TABLET ORAL at 15:37

## 2023-09-24 RX ADMIN — OXYCODONE HYDROCHLORIDE 5 MILLIGRAM(S): 5 TABLET ORAL at 22:12

## 2023-09-24 RX ADMIN — CYCLOBENZAPRINE HYDROCHLORIDE 5 MILLIGRAM(S): 10 TABLET, FILM COATED ORAL at 15:38

## 2023-09-24 RX ADMIN — QUETIAPINE FUMARATE 300 MILLIGRAM(S): 200 TABLET, FILM COATED ORAL at 22:10

## 2023-09-24 RX ADMIN — PREGABALIN 1000 MICROGRAM(S): 225 CAPSULE ORAL at 15:37

## 2023-09-24 RX ADMIN — OXYCODONE HYDROCHLORIDE 5 MILLIGRAM(S): 5 TABLET ORAL at 23:05

## 2023-09-24 RX ADMIN — Medication 10 MILLIGRAM(S): at 22:09

## 2023-09-24 RX ADMIN — Medication 150 MILLIGRAM(S): at 22:10

## 2023-09-24 RX ADMIN — BICTEGRAVIR SODIUM, EMTRICITABINE, AND TENOFOVIR ALAFENAMIDE FUMARATE 1 TABLET(S): 30; 120; 15 TABLET ORAL at 15:37

## 2023-09-24 RX ADMIN — Medication 10 MILLIGRAM(S): at 06:06

## 2023-09-24 RX ADMIN — CYCLOBENZAPRINE HYDROCHLORIDE 10 MILLIGRAM(S): 10 TABLET, FILM COATED ORAL at 22:10

## 2023-09-24 RX ADMIN — Medication 10 MILLIGRAM(S): at 15:37

## 2023-09-24 NOTE — PROGRESS NOTE ADULT - PROBLEM SELECTOR PLAN 1
--Pt w/ headaches, neck stiffness, gait instability  --Unclear etiology though lower suspicion for infection at this time given physical exam. Lower c/f meningitis/encephalitis  --CT Head w/o acute intracranial pathology  --CTA H/N w/ enlarged adenoids, mildly enlarged palatine/lingual tonsils, otherwise w/o occlusive dx  --ESR/CRP elevated  --Obtain CK, Utox, metal screen, crypt antigen, b12, Folate, TSH, Lipid panel , SHAI, dsDNA, sjogren ab,   --Appreciate neuro recs  - will check MRI brain and spine   Neuro rec , -can increase cyclobenzaprine to 5 mg AM / 5 mg mid-day / 10 mg QHS  -can increase pregabalin to 100 mg AM / 150 mg PM x3 days, then if tolerated and if no improvement in pain, increase to pregabalin 150 mg bid  pt asking for ativan 5 mg IV prior to MRI as he is claustrophobic , explained to patient , we can give valium 5 mg po 30 min prior to give ativan 1 mg IV --Pt w/ headaches, neck stiffness, gait instability  --Unclear etiology though lower suspicion for infection at this time given physical exam. Lower c/f meningitis/encephalitis  --CT Head w/o acute intracranial pathology  --CTA H/N w/ enlarged adenoids, mildly enlarged palatine/lingual tonsils, otherwise w/o occlusive dx  --ESR/CRP elevated  --Obtain CK, Utox, metal screen, crypt antigen, b12, Folate, TSH, Lipid panel , SHAI, dsDNA, sjogren ab,   --Appreciate neuro recs  -pt says headache has resolved   -MRI brain and spine pending   Neuro rec , -can increase cyclobenzaprine to 5 mg AM / 5 mg mid-day / 10 mg QHS  -can increase pregabalin to 100 mg AM / 150 mg PM x3 days, then if tolerated and if no improvement in pain, increase to pregabalin 150 mg bid  pt asking for ativan 5 mg IV prior to MRI as he is claustrophobic , explained to patient , we can give valium 5 mg po 30 min prior to give ativan 1 mg IV

## 2023-09-24 NOTE — PROGRESS NOTE ADULT - SUBJECTIVE AND OBJECTIVE BOX
Patient is a 33y old  Male who presents with a chief complaint of Gait instability (23 Sep 2023 16:30)      SUBJECTIVE / OVERNIGHT EVENTS:    MEDICATIONS  (STANDING):  baclofen 10 milliGRAM(s) Oral every 8 hours  bictegravir 50 mG/emtricitabine 200 mG/tenofovir alafenamide 25 mG (BIKTARVY) 1 Tablet(s) Oral daily  cyanocobalamin 1000 MICROGram(s) Oral daily  cyclobenzaprine 5 milliGRAM(s) Oral <User Schedule>  cyclobenzaprine 10 milliGRAM(s) Oral <User Schedule>  enoxaparin Injectable 40 milliGRAM(s) SubCutaneous every 24 hours  influenza   Vaccine 0.5 milliLiter(s) IntraMuscular once  lidocaine   4% Patch 1 Patch Transdermal every 24 hours  magnesium oxide 400 milliGRAM(s) Oral three times a day with meals  melatonin 3 milliGRAM(s) Oral at bedtime  pregabalin 150 milliGRAM(s) Oral at bedtime  pregabalin 100 milliGRAM(s) Oral daily  QUEtiapine 100 milliGRAM(s) Oral daily  QUEtiapine 300 milliGRAM(s) Oral at bedtime  trimethoprim  160 mG/sulfamethoxazole 800 mG 1 Tablet(s) Oral <User Schedule>    MEDICATIONS  (PRN):  acetaminophen     Tablet .. 650 milliGRAM(s) Oral every 6 hours PRN Mild Pain (1 - 3), Moderate Pain (4 - 6)  diazepam    Tablet 5 milliGRAM(s) Oral once PRN on call for MRI  oxyCODONE    IR 5 milliGRAM(s) Oral every 6 hours PRN Severe Pain (7 - 10)      Vital Signs Last 24 Hrs  T(C): 36.6 (24 Sep 2023 05:03), Max: 36.9 (23 Sep 2023 12:20)  T(F): 97.8 (24 Sep 2023 05:03), Max: 98.4 (23 Sep 2023 12:20)  HR: 85 (24 Sep 2023 05:03) (79 - 85)  BP: 127/72 (24 Sep 2023 05:03) (116/69 - 127/72)  BP(mean): --  RR: 17 (24 Sep 2023 05:03) (17 - 18)  SpO2: 98% (24 Sep 2023 05:03) (98% - 100%)    Parameters below as of 24 Sep 2023 05:03  Patient On (Oxygen Delivery Method): room air      CAPILLARY BLOOD GLUCOSE        I&O's Summary      PHYSICAL EXAM:  GENERAL: NAD, well-developed  HEAD:  Atraumatic, Normocephalic  EYES: EOMI, PERRLA, conjunctiva and sclera clear  NECK: Supple, No JVD  CHEST/LUNG: Clear to auscultation bilaterally; No wheeze  HEART: Regular rate and rhythm; No murmurs, rubs, or gallops  ABDOMEN: Soft, Nontender, Nondistended; Bowel sounds present  EXTREMITIES:  2+ Peripheral Pulses, No clubbing, cyanosis, or edema  PSYCH: AAOx3  NEUROLOGY: non-focal  SKIN: No rashes or lesions    LABS:                        12.6   3.46  )-----------( 243      ( 23 Sep 2023 06:12 )             38.2     09-23    138  |  105  |  12  ----------------------------<  97  4.0   |  24  |  0.85    Ca    8.9      23 Sep 2023 06:12  Phos  3.0     09-23  Mg     1.70     09-23            Urinalysis Basic - ( 23 Sep 2023 06:12 )    Color: x / Appearance: x / SG: x / pH: x  Gluc: 97 mg/dL / Ketone: x  / Bili: x / Urobili: x   Blood: x / Protein: x / Nitrite: x   Leuk Esterase: x / RBC: x / WBC x   Sq Epi: x / Non Sq Epi: x / Bacteria: x        RADIOLOGY & ADDITIONAL TESTS:    Imaging Personally Reviewed:    Consultant(s) Notes Reviewed:      Care Discussed with Consultants/Other Providers:   Patient is a 33y old  Male who presents with a chief complaint of Gait instability (23 Sep 2023 16:30)      SUBJECTIVE / OVERNIGHT EVENTS: patient seen and examined by bedside, , pt says headache resolved, still has weakness in legs , denies  dizziness, SOB, CP, Palpitations , N/V/D, abdominal pain      MEDICATIONS  (STANDING):  baclofen 10 milliGRAM(s) Oral every 8 hours  bictegravir 50 mG/emtricitabine 200 mG/tenofovir alafenamide 25 mG (BIKTARVY) 1 Tablet(s) Oral daily  cyanocobalamin 1000 MICROGram(s) Oral daily  cyclobenzaprine 5 milliGRAM(s) Oral <User Schedule>  cyclobenzaprine 10 milliGRAM(s) Oral <User Schedule>  enoxaparin Injectable 40 milliGRAM(s) SubCutaneous every 24 hours  influenza   Vaccine 0.5 milliLiter(s) IntraMuscular once  lidocaine   4% Patch 1 Patch Transdermal every 24 hours  magnesium oxide 400 milliGRAM(s) Oral three times a day with meals  melatonin 3 milliGRAM(s) Oral at bedtime  pregabalin 150 milliGRAM(s) Oral at bedtime  pregabalin 100 milliGRAM(s) Oral daily  QUEtiapine 100 milliGRAM(s) Oral daily  QUEtiapine 300 milliGRAM(s) Oral at bedtime  trimethoprim  160 mG/sulfamethoxazole 800 mG 1 Tablet(s) Oral <User Schedule>    MEDICATIONS  (PRN):  acetaminophen     Tablet .. 650 milliGRAM(s) Oral every 6 hours PRN Mild Pain (1 - 3), Moderate Pain (4 - 6)  diazepam    Tablet 5 milliGRAM(s) Oral once PRN on call for MRI  oxyCODONE    IR 5 milliGRAM(s) Oral every 6 hours PRN Severe Pain (7 - 10)      Vital Signs Last 24 Hrs  T(C): 36.6 (24 Sep 2023 05:03), Max: 36.9 (23 Sep 2023 12:20)  T(F): 97.8 (24 Sep 2023 05:03), Max: 98.4 (23 Sep 2023 12:20)  HR: 85 (24 Sep 2023 05:03) (79 - 85)  BP: 127/72 (24 Sep 2023 05:03) (116/69 - 127/72)  BP(mean): --  RR: 17 (24 Sep 2023 05:03) (17 - 18)  SpO2: 98% (24 Sep 2023 05:03) (98% - 100%)    Parameters below as of 24 Sep 2023 05:03  Patient On (Oxygen Delivery Method): room air        PHYSICAL EXAM:  GENERAL: NAD,   HEAD:  Atraumatic, Normocephalic  EYES: EOMI, PERRLA, conjunctiva and sclera clear  NECK: Supple, No JVD  CHEST/LUNG: Clear to auscultation bilaterally; No wheeze  HEART: Regular rate and rhythm; No murmurs, rubs, or gallops  ABDOMEN: Soft, Nontender, Nondistended; Bowel sounds present  EXTREMITIES:  2+ Peripheral Pulses, No clubbing, cyanosis, or edema  PSYCH: AAOx3  NEUROLOGY:  2/5 strength LLE 3-4/5 in RLE , +4/5 upper ext b/l , sensation dec B/L LE.     LABS:                        12.6   3.46  )-----------( 243      ( 23 Sep 2023 06:12 )             38.2     09-23    138  |  105  |  12  ----------------------------<  97  4.0   |  24  |  0.85    Ca    8.9      23 Sep 2023 06:12  Phos  3.0     09-23  Mg     1.70     09-23            Urinalysis Basic - ( 23 Sep 2023 06:12 )    Color: x / Appearance: x / SG: x / pH: x  Gluc: 97 mg/dL / Ketone: x  / Bili: x / Urobili: x   Blood: x / Protein: x / Nitrite: x   Leuk Esterase: x / RBC: x / WBC x   Sq Epi: x / Non Sq Epi: x / Bacteria: x        RADIOLOGY & ADDITIONAL TESTS:    Imaging Personally Reviewed:    Consultant(s) Notes Reviewed:      Care Discussed with Consultants/Other Providers:

## 2023-09-25 ENCOUNTER — TRANSCRIPTION ENCOUNTER (OUTPATIENT)
Age: 33
End: 2023-09-25

## 2023-09-25 ENCOUNTER — NON-APPOINTMENT (OUTPATIENT)
Age: 33
End: 2023-09-25

## 2023-09-25 LAB
ANION GAP SERPL CALC-SCNC: 11 MMOL/L — SIGNIFICANT CHANGE UP (ref 7–14)
BUN SERPL-MCNC: 9 MG/DL — SIGNIFICANT CHANGE UP (ref 7–23)
CALCIUM SERPL-MCNC: 9 MG/DL — SIGNIFICANT CHANGE UP (ref 8.4–10.5)
CHLORIDE SERPL-SCNC: 107 MMOL/L — SIGNIFICANT CHANGE UP (ref 98–107)
CO2 SERPL-SCNC: 22 MMOL/L — SIGNIFICANT CHANGE UP (ref 22–31)
CREAT SERPL-MCNC: 0.84 MG/DL — SIGNIFICANT CHANGE UP (ref 0.5–1.3)
EGFR: 117 ML/MIN/1.73M2 — SIGNIFICANT CHANGE UP
GLUCOSE SERPL-MCNC: 84 MG/DL — SIGNIFICANT CHANGE UP (ref 70–99)
HCT VFR BLD CALC: 39.8 % — SIGNIFICANT CHANGE UP (ref 39–50)
HGB BLD-MCNC: 13 G/DL — SIGNIFICANT CHANGE UP (ref 13–17)
MAGNESIUM SERPL-MCNC: 1.6 MG/DL — SIGNIFICANT CHANGE UP (ref 1.6–2.6)
MCHC RBC-ENTMCNC: 29.3 PG — SIGNIFICANT CHANGE UP (ref 27–34)
MCHC RBC-ENTMCNC: 32.7 GM/DL — SIGNIFICANT CHANGE UP (ref 32–36)
MCV RBC AUTO: 89.6 FL — SIGNIFICANT CHANGE UP (ref 80–100)
NRBC # BLD: 0 /100 WBCS — SIGNIFICANT CHANGE UP (ref 0–0)
NRBC # FLD: 0 K/UL — SIGNIFICANT CHANGE UP (ref 0–0)
PHOSPHATE SERPL-MCNC: 4.6 MG/DL — HIGH (ref 2.5–4.5)
PLATELET # BLD AUTO: 274 K/UL — SIGNIFICANT CHANGE UP (ref 150–400)
POTASSIUM SERPL-MCNC: 4 MMOL/L — SIGNIFICANT CHANGE UP (ref 3.5–5.3)
POTASSIUM SERPL-SCNC: 4 MMOL/L — SIGNIFICANT CHANGE UP (ref 3.5–5.3)
RBC # BLD: 4.44 M/UL — SIGNIFICANT CHANGE UP (ref 4.2–5.8)
RBC # FLD: 12.2 % — SIGNIFICANT CHANGE UP (ref 10.3–14.5)
SODIUM SERPL-SCNC: 140 MMOL/L — SIGNIFICANT CHANGE UP (ref 135–145)
WBC # BLD: 4.33 K/UL — SIGNIFICANT CHANGE UP (ref 3.8–10.5)
WBC # FLD AUTO: 4.33 K/UL — SIGNIFICANT CHANGE UP (ref 3.8–10.5)

## 2023-09-25 PROCEDURE — 99232 SBSQ HOSP IP/OBS MODERATE 35: CPT

## 2023-09-25 RX ORDER — DIAZEPAM 5 MG
10 TABLET ORAL ONCE
Refills: 0 | Status: DISCONTINUED | OUTPATIENT
Start: 2023-09-25 | End: 2023-09-27

## 2023-09-25 RX ADMIN — QUETIAPINE FUMARATE 100 MILLIGRAM(S): 200 TABLET, FILM COATED ORAL at 11:33

## 2023-09-25 RX ADMIN — Medication 1 TABLET(S): at 18:45

## 2023-09-25 RX ADMIN — PREGABALIN 1000 MICROGRAM(S): 225 CAPSULE ORAL at 11:33

## 2023-09-25 RX ADMIN — Medication 150 MILLIGRAM(S): at 22:43

## 2023-09-25 RX ADMIN — Medication 10 MILLIGRAM(S): at 13:10

## 2023-09-25 RX ADMIN — QUETIAPINE FUMARATE 300 MILLIGRAM(S): 200 TABLET, FILM COATED ORAL at 23:15

## 2023-09-25 RX ADMIN — Medication 10 MILLIGRAM(S): at 06:17

## 2023-09-25 RX ADMIN — Medication 3 MILLIGRAM(S): at 22:43

## 2023-09-25 RX ADMIN — CYCLOBENZAPRINE HYDROCHLORIDE 5 MILLIGRAM(S): 10 TABLET, FILM COATED ORAL at 13:10

## 2023-09-25 RX ADMIN — CYCLOBENZAPRINE HYDROCHLORIDE 10 MILLIGRAM(S): 10 TABLET, FILM COATED ORAL at 22:56

## 2023-09-25 RX ADMIN — BICTEGRAVIR SODIUM, EMTRICITABINE, AND TENOFOVIR ALAFENAMIDE FUMARATE 1 TABLET(S): 30; 120; 15 TABLET ORAL at 11:32

## 2023-09-25 RX ADMIN — CYCLOBENZAPRINE HYDROCHLORIDE 5 MILLIGRAM(S): 10 TABLET, FILM COATED ORAL at 06:16

## 2023-09-25 RX ADMIN — Medication 100 MILLIGRAM(S): at 11:33

## 2023-09-25 RX ADMIN — Medication 10 MILLIGRAM(S): at 22:56

## 2023-09-25 NOTE — PROGRESS NOTE ADULT - SUBJECTIVE AND OBJECTIVE BOX
Dr. Tracy Muniz  Pager [phone]    PROGRESS NOTE:     Patient is a 33y old  Male who presents with a chief complaint of Gait instability (25 Sep 2023 11:18)      SUBJECTIVE / OVERNIGHT EVENTS: denies chest pain or sob   ADDITIONAL REVIEW OF SYSTEMS: afebrile , reports b/l leg weakness and unable to ambulate for a week    MEDICATIONS  (STANDING):  baclofen 10 milliGRAM(s) Oral every 8 hours  bictegravir 50 mG/emtricitabine 200 mG/tenofovir alafenamide 25 mG (BIKTARVY) 1 Tablet(s) Oral daily  cyanocobalamin 1000 MICROGram(s) Oral daily  cyclobenzaprine 5 milliGRAM(s) Oral <User Schedule>  cyclobenzaprine 10 milliGRAM(s) Oral <User Schedule>  diazepam    Tablet 10 milliGRAM(s) Oral once  enoxaparin Injectable 40 milliGRAM(s) SubCutaneous every 24 hours  lidocaine   4% Patch 1 Patch Transdermal every 24 hours  melatonin 3 milliGRAM(s) Oral at bedtime  pregabalin 150 milliGRAM(s) Oral at bedtime  QUEtiapine 100 milliGRAM(s) Oral daily  QUEtiapine 300 milliGRAM(s) Oral at bedtime  trimethoprim  160 mG/sulfamethoxazole 800 mG 1 Tablet(s) Oral <User Schedule>    MEDICATIONS  (PRN):  acetaminophen     Tablet .. 650 milliGRAM(s) Oral every 6 hours PRN Mild Pain (1 - 3), Moderate Pain (4 - 6)  oxyCODONE    IR 5 milliGRAM(s) Oral every 6 hours PRN Severe Pain (7 - 10)      CAPILLARY BLOOD GLUCOSE        I&O's Summary    24 Sep 2023 07:01  -  25 Sep 2023 07:00  --------------------------------------------------------  IN: 0 mL / OUT: 1000 mL / NET: -1000 mL        PHYSICAL EXAM:  Vital Signs Last 24 Hrs  T(C): 36.7 (25 Sep 2023 08:29), Max: 36.7 (24 Sep 2023 15:30)  T(F): 98 (25 Sep 2023 08:29), Max: 98.1 (25 Sep 2023 05:35)  HR: 81 (25 Sep 2023 08:29) (76 - 86)  BP: 112/71 (25 Sep 2023 08:29) (112/71 - 126/82)  BP(mean): 81 (25 Sep 2023 08:29) (81 - 81)  RR: 16 (25 Sep 2023 08:29) (16 - 18)  SpO2: 98% (25 Sep 2023 08:29) (96% - 100%)    Parameters below as of 25 Sep 2023 08:29  Patient On (Oxygen Delivery Method): room air    GENERAL: NAD,   HEAD:  Atraumatic, Normocephalic  EYES: EOMI, PERRLA, conjunctiva and sclera clear  NECK: Supple, No JVD  CHEST/LUNG: Clear to auscultation bilaterally; No wheeze  HEART: Regular rate and rhythm; No murmurs, rubs, or gallops  ABDOMEN: Soft, Nontender, Nondistended; Bowel sounds present  EXTREMITIES:  2+ Peripheral Pulses, No clubbing, cyanosis, or edema  PSYCH: AAOx3  NEUROLOGY:  2/5 strength LLE 3-4/5 in RLE , +4/5 upper ext b/l , sensation dec B/L LE.       LABS:                        13.0   4.33  )-----------( 274      ( 25 Sep 2023 06:12 )             39.8     09-25    140  |  107  |  9   ----------------------------<  84  4.0   |  22  |  0.84    Ca    9.0      25 Sep 2023 06:38  Phos  4.6     09-25  Mg     1.60     09-25        Urinalysis Basic - ( 25 Sep 2023 06:38 )    Color: x / Appearance: x / SG: x / pH: x  Gluc: 84 mg/dL / Ketone: x  / Bili: x / Urobili: x   Blood: x / Protein: x / Nitrite: x   Leuk Esterase: x / RBC: x / WBC x   Sq Epi: x / Non Sq Epi: x / Bacteria: x      RADIOLOGY & ADDITIONAL TESTS:  Results Reviewed:   Imaging Personally Reviewed:  < from: CT Venogram Brain w/ IV Cont (09.21.23 @ 01:24) >  CT ANGIOGRAPHY NECK:  1.  The cervical vasculature is patent without evidence of   atherosclerosis, stenosis or dissection.  The left vertebral artery   arises directly off the aorta.  The right vertebral artery is dominant.  2.  The adenoids are moderately to severely enlarged.  The palatine and   lingual tonsils are mildly enlarged.    CT ANGIOGRAPHY BRAIN: No vessel occlusion, flow-limiting stenosis or   aneurysm about the [place].  No evidence of an arteriovenous   malformation.    CT VENOGRAM BRAIN: No evidence of dural venous sinus thrombosis.  The   superficial and deep venous systems are patent without evidence of   intraluminal thrombus.      < from: CT Brain Stroke Protocol (09.21.23 @ 01:21) >  IMPRESSION:  No CT evidence of acute intracranial pathology.    Stable, mild ventriculomegaly, greater than expected for the patient's   age.  No evidence for acute hydrocephalus.        Electrocardiogram Personally Reviewed:    COORDINATION OF CARE:  Care Discussed with Consultants/Other Providers [Y/N]: acp, expedite MRI  Prior or Outpatient Records Reviewed [Y/N]:

## 2023-09-25 NOTE — PROGRESS NOTE ADULT - PROBLEM SELECTOR PLAN 1
--Pt w/ headaches, neck stiffness, gait instability  --Unclear etiology though lower suspicion for infection at this time given physical exam. Lower c/f meningitis/encephalitis  --CT Head w/o acute intracranial pathology  --CTA H/N w/ enlarged adenoids, mildly enlarged palatine/lingual tonsils, otherwise w/o occlusive dx  -- ESR 31, CRP 7.6  -- , Utox + oxycodone/THC/cocaine, b12 583, folate 10, TSH 1.01, A1c 4.5%, lipid panel reviewed, serologies negative including SHAI, anti-dsDNA, anti-SSA/SSB, cryptococcal Ag neg  f/u metal screen, crypt antigen,  SHAI, dsDNA, sjogren ab,   --Appreciate neuro recs  -pt says headache has resolved   -MRI brain and spine pending , Valium 10 mg prior to MRI, pt refused MRI after IV ativan 0.25 mg first time  - dispo: DC plan rehab pending MRI   Neuro rec , -can increase cyclobenzaprine to 5 mg AM / 5 mg mid-day / 10 mg QHS  -can increase pregabalin to 100 mg AM / 150 mg PM x3 days, then if tolerated and if no improvement in pain, increase to pregabalin 150 mg bid  pt asking for ativan 5 mg IV prior to MRI as he is claustrophobic , explained to patient , will give valium pre-MRI

## 2023-09-25 NOTE — DISCHARGE NOTE PROVIDER - HOSPITAL COURSE
34yo M hx of  HIV on biktarvy, ?HIV myelopathy, chronic back pain, substance use, GBS? s/p influenza vaccine p/w headaches and gait instability.         Acute headache.   ·  Plan: --Pt w/ headaches, neck stiffness, gait instability  --Unclear etiology though lower suspicion for infection at this time given physical exam. Lower c/f meningitis/encephalitis  --CT Head w/o acute intracranial pathology  --CTA H/N w/ enlarged adenoids, mildly enlarged palatine/lingual tonsils, otherwise w/o occlusive dx  --ESR/CRP elevated  --Obtain CK, Utox, metal screen, crypt antigen, b12, Folate, TSH, Lipid panel , SHAI, dsDNA, sjogren ab,   --Appreciate neuro recs  -pt says headache has resolved   -MRI brain and spine pending   Neuro rec , -can increase cyclobenzaprine to 5 mg AM / 5 mg mid-day / 10 mg QHS  -can increase pregabalin to 100 mg AM / 150 mg PM x3 days, then if tolerated and if no improvement in pain, increase to pregabalin 150 mg bid  pt asking for ativan 5 mg IV prior to MRI as he is claustrophobic , explained to patient , we can give valium 5 mg po 30 min prior to give ativan 1 mg IV.     Gait instability.   ·  Plan: --Mgt as above  --PT Eval rec rehab.     Urinary incontinence.   ·  Plan: --Denies dysuria, hematuria but w/ intermittent incontinence and retention  -- UA negative   --Rest of mgt as above.   Chronic back pain.   ·  Plan: --c/w home medications  --PRN Tylenol/ oxy   --Lidocaine patch  Neuro rec noted , - will check MRI brain and spine   Neuro rec , -can increase cyclobenzaprine to 5 mg AM / 5 mg mid-day / 10 mg QHS  -can increase pregabalin to 100 mg AM / 150 mg PM x3 days, then if tolerated and if no improvement in pain, increase to pregabalin 150 mg bid.     HIV disease.   ·  Plan: --C/w biktarvy  --c/w bactrim  -- viral load and T Cell subset noted  - outpt f/u with ID.     Mood disorder.   ·  Plan: --c/w home Seroquel  --At this time w/o SI/SA   --Psych follow up outpatient.     32yo M hx of  HIV on biktarvy, ?HIV myelopathy, chronic back pain, substance use, GBS? s/p influenza vaccine p/w headaches and gait instability.         Acute headache.   ·  Plan: --Pt w/ headaches, neck stiffness, gait instability  --Unclear etiology though lower suspicion for infection at this time given physical exam. Lower c/f meningitis/encephalitis  --CT Head w/o acute intracranial pathology  --CTA H/N w/ enlarged adenoids, mildly enlarged palatine/lingual tonsils, otherwise w/o occlusive dx  --ESR/CRP elevated  --Obtain CK, Utox, metal screen, crypt antigen, b12, Folate, TSH, Lipid panel , SHAI, dsDNA, sjogren ab,   --Appreciate neuro recs  -pt says headache has resolved   -MRI brain and spine pending   Neuro rec , -can increase cyclobenzaprine to 5 mg AM / 5 mg mid-day / 10 mg QHS  -can increase pregabalin to 100 mg AM / 150 mg PM x3 days, then if tolerated and if no improvement in pain, increase to pregabalin 150 mg bid  pt asking for ativan 5 mg IV prior to MRI as he is claustrophobic , explained to patient , we can give valium 5 mg po 30 min prior to give ativan 1 mg IV.     Gait instability.   ·  Plan: --Mgt as above  --PT Eval rec rehab.     Urinary incontinence.   ·  Plan: --Denies dysuria, hematuria but w/ intermittent incontinence and retention  -- UA negative   --Rest of mgt as above.   Chronic back pain.   ·  Plan: --c/w home medications  --PRN Tylenol/ oxy   --Lidocaine patch  Neuro rec noted , - will check MRI brain and spine   Neuro rec , -can increase cyclobenzaprine to 5 mg AM / 5 mg mid-day / 10 mg QHS  -can increase pregabalin to 100 mg AM / 150 mg PM x3 days, then if tolerated and if no improvement in pain, increase to pregabalin 150 mg bid.     HIV disease.   ·  Plan: --C/w biktarvy  --c/w bactrim  -- viral load and T Cell subset noted  - outpt f/u with ID.     Mood disorder.   ·  Plan: --c/w home Seroquel  --At this time w/o SI/SA   --Psych follow up outpatient.    Called by nurse to evaluate patient who wished to leave the hospital against medical advice. Patient is A&O x3 and has full capacity to make his or her own medical decisions. Patient was informed of their medical conditions, benefits, and alternatives to treatment as well as the risks of refusing treatment and the seriousness of leaving against medical advice including patient harm, injury or death. I have offered to answer any questions and fully answer such questions, witnessed by the nurse.  At this time, I believe patient fully understands what has been explained and after expressing full understanding, patient still wished to sign out against medical advice. Attending made aware.

## 2023-09-25 NOTE — DISCHARGE NOTE PROVIDER - NSDCMRMEDTOKEN_GEN_ALL_CORE_FT
baclofen 10 mg oral tablet: 1 tab(s) orally 3 times a day  bictegravir/emtricitabine/tenofovir 50 mg-200 mg-25 mg oral tablet: 1 tab(s) orally once a day MDD: 1 tablet  cyanocobalamin 1000 mcg oral tablet: 1 tab(s) orally once a day  cyclobenzaprine 10 mg oral tablet: 1 tab(s) orally once a day  pregabalin 100 mg oral capsule: 1 cap(s) orally 2 times a day MDD: 200mg  QUEtiapine 100 mg oral tablet: 1 tab(s) orally once a day  QUEtiapine 300 mg oral tablet: 1 tab(s) orally once a day (at bedtime)  sulfamethoxazole-trimethoprim 800 mg-160 mg oral tablet: 1 tab(s) orally 3 times a week Monday, Wednesday, Friday   baclofen 10 mg oral tablet: 1 tab(s) orally 3 times a day  bictegravir/emtricitabine/tenofovir 50 mg-200 mg-25 mg oral tablet: 1 tab(s) orally once a day MDD: 1 tablet  cyanocobalamin 1000 mcg oral tablet: 1 tab(s) orally once a day  cyclobenzaprine 10 mg oral tablet: 1 tab(s) orally once a day (at bedtime) MDD: 1 tablet  cyclobenzaprine 10 mg oral tablet: 0.5 tab(s) orally 2 times a day at 6am and 2pm MDD: 2 tablets  Lyrica 150 mg oral capsule: 1 cap(s) orally 2 times a day MDD: 2 tablets  QUEtiapine 100 mg oral tablet: 1 tab(s) orally once a day  QUEtiapine 300 mg oral tablet: 1 tab(s) orally once a day (at bedtime)  sulfamethoxazole-trimethoprim 800 mg-160 mg oral tablet: 1 tab(s) orally 3 times a week Monday, Wednesday, Friday   baclofen 10 mg oral tablet: 1 tab(s) orally 3 times a day  bictegravir/emtricitabine/tenofovir 50 mg-200 mg-25 mg oral tablet: 1 tab(s) orally once a day MDD: 1 tablet  cyanocobalamin 1000 mcg oral tablet: 1 tab(s) orally once a day  cyclobenzaprine 10 mg oral tablet: 1 tab(s) orally once a day (at bedtime) MDD: 1 tablet  cyclobenzaprine 5 mg oral tablet: 1 tab(s) orally 2 times a day at 6am and 2pm MDD: 2 tablets  Lyrica 150 mg oral capsule: 1 cap(s) orally 2 times a day MDD: 2 tablets  QUEtiapine 100 mg oral tablet: 1 tab(s) orally once a day  QUEtiapine 300 mg oral tablet: 1 tab(s) orally once a day (at bedtime)  sulfamethoxazole-trimethoprim 800 mg-160 mg oral tablet: 1 tab(s) orally 3 times a week Monday, Wednesday, Friday

## 2023-09-25 NOTE — DISCHARGE NOTE PROVIDER - NSDCCPCAREPLAN_GEN_ALL_CORE_FT
PRINCIPAL DISCHARGE DIAGNOSIS  Diagnosis: Gait instability  Assessment and Plan of Treatment:       SECONDARY DISCHARGE DIAGNOSES  Diagnosis: Acute headache  Assessment and Plan of Treatment:     Diagnosis: Chronic back pain  Assessment and Plan of Treatment: continue pain medications as directed    Diagnosis: HIV disease  Assessment and Plan of Treatment: Continue your home medications . Follow up with your Infectioius disease doctor    Diagnosis: Mood disorder  Assessment and Plan of Treatment: continue your home medications . Follow up with your psychiatrist

## 2023-09-26 LAB — SARS-COV-2 RNA SPEC QL NAA+PROBE: SIGNIFICANT CHANGE UP

## 2023-09-26 PROCEDURE — 99222 1ST HOSP IP/OBS MODERATE 55: CPT

## 2023-09-26 PROCEDURE — 99232 SBSQ HOSP IP/OBS MODERATE 35: CPT

## 2023-09-26 RX ADMIN — Medication 10 MILLIGRAM(S): at 13:56

## 2023-09-26 RX ADMIN — CYCLOBENZAPRINE HYDROCHLORIDE 5 MILLIGRAM(S): 10 TABLET, FILM COATED ORAL at 06:29

## 2023-09-26 RX ADMIN — QUETIAPINE FUMARATE 100 MILLIGRAM(S): 200 TABLET, FILM COATED ORAL at 11:13

## 2023-09-26 RX ADMIN — BICTEGRAVIR SODIUM, EMTRICITABINE, AND TENOFOVIR ALAFENAMIDE FUMARATE 1 TABLET(S): 30; 120; 15 TABLET ORAL at 11:10

## 2023-09-26 RX ADMIN — Medication 10 MILLIGRAM(S): at 22:20

## 2023-09-26 RX ADMIN — Medication 150 MILLIGRAM(S): at 17:01

## 2023-09-26 RX ADMIN — CYCLOBENZAPRINE HYDROCHLORIDE 10 MILLIGRAM(S): 10 TABLET, FILM COATED ORAL at 22:20

## 2023-09-26 RX ADMIN — OXYCODONE HYDROCHLORIDE 5 MILLIGRAM(S): 5 TABLET ORAL at 03:37

## 2023-09-26 RX ADMIN — CYCLOBENZAPRINE HYDROCHLORIDE 5 MILLIGRAM(S): 10 TABLET, FILM COATED ORAL at 13:57

## 2023-09-26 RX ADMIN — Medication 3 MILLIGRAM(S): at 22:21

## 2023-09-26 RX ADMIN — OXYCODONE HYDROCHLORIDE 5 MILLIGRAM(S): 5 TABLET ORAL at 04:30

## 2023-09-26 RX ADMIN — Medication 10 MILLIGRAM(S): at 06:29

## 2023-09-26 RX ADMIN — PREGABALIN 1000 MICROGRAM(S): 225 CAPSULE ORAL at 11:11

## 2023-09-26 RX ADMIN — QUETIAPINE FUMARATE 300 MILLIGRAM(S): 200 TABLET, FILM COATED ORAL at 22:20

## 2023-09-26 NOTE — DISCHARGE NOTE NURSING/CASE MANAGEMENT/SOCIAL WORK - NSDCVIVACCINE_GEN_ALL_CORE_FT
Tdap; 09-Jul-2022 05:12; Annabelle Curiel (RN); [employer]; [id number] (Exp. Date: 11-Mar-2024); IntraMuscular; Deltoid Right.; 0.5 milliLiter(s); VIS (VIS Published: 09-May-2013, VIS Presented: 09-Jul-2022);

## 2023-09-26 NOTE — DISCHARGE NOTE NURSING/CASE MANAGEMENT/SOCIAL WORK - NSDCPEFALRISK_GEN_ALL_CORE
For information on Fall & Injury Prevention, visit: [email or web address] OR  [email or web address] OR  https://www.cdc.gov/steadi/patient.html

## 2023-09-26 NOTE — PROGRESS NOTE ADULT - PROBLEM SELECTOR PLAN 1
--Pt w/ headaches, neck stiffness, gait instability  --Unclear etiology though lower suspicion for infection at this time given physical exam. Lower c/f meningitis/encephalitis  --CT Head w/o acute intracranial pathology  - no evidence of cord compression, no perineal anesthesia, urinary/fecal incontinence   --CTA H/N w/ enlarged adenoids, mildly enlarged palatine/lingual tonsils, otherwise w/o occlusive dx  -- ESR 31, CRP 7.6  -- , Utox + oxycodone/THC/cocaine, b12 583, folate 10, TSH 1.01, A1c 4.5%, lipid panel reviewed, serologies negative including SHAI, anti-dsDNA, anti-SSA/SSB, cryptococcal Ag neg  f/u metal screen   --Appreciate neuro recs  -pt says headache has resolved   -MRI brain and spine pending , Valium 10 mg prior to MRI, pt refused MRI after IV ativan 0.25 mg first time  - dispo: DC plan rehab pending MRI   Neuro rec , -can increase cyclobenzaprine to 5 mg AM / 5 mg mid-day / 10 mg QHS  -given lyrica 100 mg AM / 150 mg PM x3 days, increase to pregabalin 150 mg bid  pt asking for ativan 5 mg IV prior to MRI as he is claustrophobic , explained to patient , will give valium pre-MRI

## 2023-09-26 NOTE — ED ADULT NURSE NOTE - CAS TRG GENERAL NORM CIRC DET
Strong peripheral pulses Valtrex Counseling: I discussed with the patient the risks of valacyclovir including but not limited to kidney damage, nausea, vomiting and severe allergy.  The patient understands that if the infection seems to be worsening or is not improving, they are to call.

## 2023-09-26 NOTE — PROGRESS NOTE ADULT - SUBJECTIVE AND OBJECTIVE BOX
Dr. Tracy Muniz  Pager [phone]    PROGRESS NOTE:     Patient is a 33y old  Male who presents with a chief complaint of Gait instability (25 Sep 2023 13:50)      SUBJECTIVE / OVERNIGHT EVENTS: sleeping, no complaints other than legs still weak  ADDITIONAL REVIEW OF SYSTEMS: afebrile     MEDICATIONS  (STANDING):  baclofen 10 milliGRAM(s) Oral every 8 hours  bictegravir 50 mG/emtricitabine 200 mG/tenofovir alafenamide 25 mG (BIKTARVY) 1 Tablet(s) Oral daily  cyanocobalamin 1000 MICROGram(s) Oral daily  cyclobenzaprine 5 milliGRAM(s) Oral <User Schedule>  cyclobenzaprine 10 milliGRAM(s) Oral <User Schedule>  diazepam    Tablet 10 milliGRAM(s) Oral once  enoxaparin Injectable 40 milliGRAM(s) SubCutaneous every 24 hours  lidocaine   4% Patch 1 Patch Transdermal every 24 hours  melatonin 3 milliGRAM(s) Oral at bedtime  QUEtiapine 100 milliGRAM(s) Oral daily  QUEtiapine 300 milliGRAM(s) Oral at bedtime  trimethoprim  160 mG/sulfamethoxazole 800 mG 1 Tablet(s) Oral <User Schedule>    MEDICATIONS  (PRN):  acetaminophen     Tablet .. 650 milliGRAM(s) Oral every 6 hours PRN Mild Pain (1 - 3), Moderate Pain (4 - 6)  oxyCODONE    IR 5 milliGRAM(s) Oral every 6 hours PRN Severe Pain (7 - 10)      CAPILLARY BLOOD GLUCOSE        I&O's Summary      PHYSICAL EXAM:  Vital Signs Last 24 Hrs  T(C): 36.8 (26 Sep 2023 11:00), Max: 36.9 (25 Sep 2023 14:32)  T(F): 98.2 (26 Sep 2023 11:00), Max: 98.5 (25 Sep 2023 22:17)  HR: 83 (26 Sep 2023 11:00) (80 - 93)  BP: 101/67 (26 Sep 2023 11:00) (101/67 - 140/85)  BP(mean): --  RR: 18 (26 Sep 2023 11:00) (17 - 18)  SpO2: 100% (26 Sep 2023 11:00) (99% - 100%)    Parameters below as of 26 Sep 2023 11:00  Patient On (Oxygen Delivery Method): room air      GENERAL: NAD,   HEAD:  Atraumatic, Normocephalic  EYES: EOMI, PERRLA, conjunctiva and sclera clear  NECK: Supple, No JVD  CHEST/LUNG: Clear to auscultation bilaterally; No wheeze  HEART: Regular rate and rhythm; No murmurs, rubs, or gallops  ABDOMEN: Soft, Nontender, Nondistended; Bowel sounds present  EXTREMITIES:  2+ Peripheral Pulses, No clubbing, cyanosis, or edema  PSYCH: AAOx3  NEUROLOGY:  2/5 strength LLE 3-4/5 in RLE , +4/5 upper ext b/l , sensation dec B/L LE.     LABS:                        13.0   4.33  )-----------( 274      ( 25 Sep 2023 06:12 )             39.8     09-25    140  |  107  |  9   ----------------------------<  84  4.0   |  22  |  0.84    Ca    9.0      25 Sep 2023 06:38  Phos  4.6     09-25  Mg     1.60     09-25            Urinalysis Basic - ( 25 Sep 2023 06:38 )    Color: x / Appearance: x / SG: x / pH: x  Gluc: 84 mg/dL / Ketone: x  / Bili: x / Urobili: x   Blood: x / Protein: x / Nitrite: x   Leuk Esterase: x / RBC: x / WBC x   Sq Epi: x / Non Sq Epi: x / Bacteria: x          RADIOLOGY & ADDITIONAL TESTS:  Results Reviewed:   Imaging Personally Reviewed:  Electrocardiogram Personally Reviewed:    COORDINATION OF CARE:  Care Discussed with Consultants/Other Providers [Y/N]:  Prior or Outpatient Records Reviewed [Y/N]:

## 2023-09-26 NOTE — DISCHARGE NOTE NURSING/CASE MANAGEMENT/SOCIAL WORK - PATIENT PORTAL LINK FT
You can access the FollowMyHealth Patient Portal offered by [facility] by registering at the following website: [email or web address]. By joining [facility]’s FollowMyHealth portal, you will also be able to view your health information using other applications (apps) compatible with our system.

## 2023-09-26 NOTE — CONSULT NOTE ADULT - SUBJECTIVE AND OBJECTIVE BOX
HPI:   34yo M hx of  HIV on biktarvy, ?HIV myelopathy, chronic back pain, substance use, GBS? s/p influenza vaccine p/w headaches and gait instability. Pt states sx started on Wednesday morning. Describes headaches as constant and located in the occipital region. Also w/ associated photophobia and neck stiffness. Otherwise w/o aggravating factors. States later in the day he noted gait instability; "like I was tripping over myself" (at baseline able to ambulate on his own). Also w/ dizziness and light headedness prompting pt to come to the E.D. ROS also positive for urinary incontience w/ intermittent urinary retention which pt states has been ongoing for 1 week. At this time denies fecal incontinence or saddle anesthesia. Also reports back pain which pt states is chronic as well as muscle spasms which pt states is typically intermittent but has been worsening over the past week. Intermittent tearful during interview, 2/2 multiple hospitalizations and E.D visits for similar presentations. Discusses fears of dying and feelings of isolation. Denies SI/SA. ROS otherwise negative. Denies fevers, chills. rhinorrhea, nasal congestion, chest pain, SOB, abdominal pain, constipation, diarrhea, vision or hearing difficulties, numbness/tingling.     In the E.D, vitals stable. CODE stroke activated. Neuro  consulted. Admitted to medicine for further mgt.   (21 Sep 2023 12:21)    Patient was admitted on 9/21, seen today, reports headache, LE weakness improved. Patient reports had difficulty getting in/out of shower due to needing to raise leg one foot to clear. Used arms to help stand from seated position. Some difficulty with stairs.     REVIEW OF SYSTEMS  Constitutional - No fever, No weight loss, No fatigue  HEENT - No eye pain, No visual disturbances, No difficulty hearing, No tinnitus, No vertigo, No neck pain  Respiratory - No cough, No wheezing, No shortness of breath  Cardiovascular - No chest pain, No palpitations  Gastrointestinal - No abdominal pain, No nausea, No vomiting, No diarrhea, No constipation  Genitourinary - No dysuria, No frequency, No hematuria, No incontinence  Psychiatric - No depression, No anxiety    VITALS  T(C): 36.8 (09-26-23 @ 11:00), Max: 36.9 (09-25-23 @ 22:17)  HR: 83 (09-26-23 @ 11:00) (80 - 93)  BP: 101/67 (09-26-23 @ 11:00) (101/67 - 138/88)  RR: 18 (09-26-23 @ 11:00) (17 - 18)  SpO2: 100% (09-26-23 @ 11:00) (99% - 100%)  Wt(kg): --    PAST MEDICAL & SURGICAL HISTORY  HIV (human immunodeficiency virus infection)    Guillain-[place]    Asthma    CVA (cerebral vascular accident)    Closed fracture of multiple ribs of right side, initial encounter    Cocaine abuse    Chronic sinusitis    Homeless    No significant past surgical history        SOCIAL HISTORY  see HPI     FUNCTIONAL HISTORY  Lives in shelter, 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[employer], sits on stool  Independent AMB and ADLs PTA     CURRENT FUNCTIONAL STATUS  9/22 PT  bed mobility stand by assist  transfers min assist with RW  gait mod assist with RW x 10 feet     FAMILY HISTORY   FH: HIV infection (Mother)        RECENT LABS/IMAGING  CBC Full  -  ( 25 Sep 2023 06:12 )  WBC Count : 4.33 K/uL  RBC Count : 4.44 M/uL  Hemoglobin : 13.0 g/dL  Hematocrit : 39.8 %  Platelet Count - Automated : 274 K/uL  Mean Cell Volume : 89.6 fL  Mean Cell Hemoglobin : 29.3 pg  Mean Cell Hemoglobin Concentration : 32.7 gm/dL  Auto Neutrophil # : x  Auto Lymphocyte # : x  Auto Monocyte # : x  Auto Eosinophil # : x  Auto Basophil # : x  Auto Neutrophil % : x  Auto Lymphocyte % : x  Auto Monocyte % : x  Auto Eosinophil % : x  Auto Basophil % : x    09-25    140  |  107  |  9   ----------------------------<  84  4.0   |  22  |  0.84    Ca    9.0      25 Sep 2023 06:38  Phos  4.6     09-25  Mg     1.60     09-25      Urinalysis Basic - ( 25 Sep 2023 06:38 )    Color: x / Appearance: x / SG: x / pH: x  Gluc: 84 mg/dL / Ketone: x  / Bili: x / Urobili: x   Blood: x / Protein: x / Nitrite: x   Leuk Esterase: x / RBC: x / WBC x   Sq Epi: x / Non Sq Epi: x / Bacteria: x    < from: CT Angio Brain Stroke Protocol  w/ IV Cont (09.21.23 @ 01:24) >    IMPRESSION:    CT ANGIOGRAPHY NECK:  1.  The cervical vasculature is patent without evidence of   atherosclerosis, stenosis or dissection.  The left vertebral artery   arises directly off the aorta.  The right vertebral artery is dominant.  2.  The adenoids are moderately to severely enlarged.  The palatine and   lingual tonsils are mildly enlarged.    CT ANGIOGRAPHY BRAIN: No vessel occlusion, flow-limiting stenosis or   aneurysm about the [place].  No evidence of an arteriovenous   malformation.    CT VENOGRAM BRAIN: No evidence of dural venous sinus thrombosis.  The   superficial and deep venous systems are patent without evidence of   intraluminal thrombus.      < end of copied text >      ALLERGIES  Toradol (Swelling)  Toradol (Anaphylaxis; Swelling)  Ceclor (Unknown)      MEDICATIONS   acetaminophen     Tablet .. 650 milliGRAM(s) Oral every 6 hours PRN  baclofen 10 milliGRAM(s) Oral every 8 hours  bictegravir 50 mG/emtricitabine 200 mG/tenofovir alafenamide 25 mG (BIKTARVY) 1 Tablet(s) Oral daily  cyanocobalamin 1000 MICROGram(s) Oral daily  cyclobenzaprine 5 milliGRAM(s) Oral <User Schedule>  cyclobenzaprine 10 milliGRAM(s) Oral <User Schedule>  diazepam    Tablet 10 milliGRAM(s) Oral once  enoxaparin Injectable 40 milliGRAM(s) SubCutaneous every 24 hours  lidocaine   4% Patch 1 Patch Transdermal every 24 hours  melatonin 3 milliGRAM(s) Oral at bedtime  oxyCODONE    IR 5 milliGRAM(s) Oral every 6 hours PRN  pregabalin 150 milliGRAM(s) Oral two times a day  QUEtiapine 300 milliGRAM(s) Oral at bedtime  QUEtiapine 100 milliGRAM(s) Oral daily  trimethoprim  160 mG/sulfamethoxazole 800 mG 1 Tablet(s) Oral <User Schedule>      ----------------------------------------------------------------------------------------  PHYSICAL EXAM  Constitutional - NAD, Comfortable, laying in bed   Chest - Breathing comfortably  Cardiovascular - S1S2   Extremities - No C/C/E, No calf tenderness   Neurologic Exam -    follows commands                 Cognitive - Awake, Alert, AAO to self, place, date, year, situation     Communication - Fluent, No dysarthria        Motor -                     LEFT    UE - ShAB 5/5, EF 5/5, EE 5/5, WE 5/5,  5/5                    RIGHT UE - ShAB 5/5, EF 5/5, EE 5/5, WE 5/5,  5/5                    LEFT    LE - HF 3/5, KE 3/5, DF 5/5, PF 5/5                    RIGHT LE - HF 3/5, KE 3/5, DF 5/5, PF 5/5        Sensory - Intact to LT     Psychiatric - Mood stable, Affect WNL  ----------------------------------------------------------------------------------------  ASSESSMENT/PLAN  33yMale h/o HIV, substance abuse, GBS with functional deficits due to LE weakness   headache improved  proximal weakness, chronic, MRI spine pending   Pain - Tylenol, flexeril, lyrica, oxycodone, baclofen, lidocaine   DVT PPX - SCDs lovenox   Rehab - Will continue to follow for ongoing rehab needs and recommendations.    continue bedside therapy   patient with limited social support/substance abuse, he is not a candidate for acute inpatient rehabilitation   recommend [facility] for continued inpatient rehabilitation   d/w

## 2023-09-27 VITALS — WEIGHT: 190.04 LBS

## 2023-09-27 PROCEDURE — 99238 HOSP IP/OBS DSCHRG MGMT 30/<: CPT

## 2023-09-27 RX ORDER — CYCLOBENZAPRINE HYDROCHLORIDE 10 MG/1
0.5 TABLET, FILM COATED ORAL
Qty: 14 | Refills: 0
Start: 2023-09-27 | End: 2023-10-10

## 2023-09-27 RX ORDER — CYCLOBENZAPRINE HYDROCHLORIDE 10 MG/1
1 TABLET, FILM COATED ORAL
Qty: 14 | Refills: 0
Start: 2023-09-27 | End: 2023-10-10

## 2023-09-27 RX ORDER — CYCLOBENZAPRINE HYDROCHLORIDE 10 MG/1
1 TABLET, FILM COATED ORAL
Qty: 28 | Refills: 0
Start: 2023-09-27 | End: 2023-10-10

## 2023-09-27 RX ORDER — CYCLOBENZAPRINE HYDROCHLORIDE 10 MG/1
1 TABLET, FILM COATED ORAL
Refills: 0 | DISCHARGE

## 2023-09-27 RX ADMIN — OXYCODONE HYDROCHLORIDE 5 MILLIGRAM(S): 5 TABLET ORAL at 08:41

## 2023-09-27 RX ADMIN — Medication 150 MILLIGRAM(S): at 05:48

## 2023-09-27 RX ADMIN — Medication 10 MILLIGRAM(S): at 05:49

## 2023-09-27 RX ADMIN — CYCLOBENZAPRINE HYDROCHLORIDE 5 MILLIGRAM(S): 10 TABLET, FILM COATED ORAL at 05:48

## 2023-09-27 NOTE — PROGRESS NOTE ADULT - PROBLEM SELECTOR PLAN 1
--Pt w/ headaches, neck stiffness, gait instability  --Unclear etiology though lower suspicion for infection at this time given physical exam. Lower c/f meningitis/encephalitis  --CT Head w/o acute intracranial pathology  - no evidence of cord compression, no perineal anesthesia, urinary/fecal incontinence   --CTA H/N w/ enlarged adenoids, mildly enlarged palatine/lingual tonsils, otherwise w/o occlusive dx  -- ESR 31, CRP 7.6  -- , Utox + oxycodone/THC/cocaine, b12 583, folate 10, TSH 1.01, A1c 4.5%, lipid panel reviewed, serologies negative including SHAI, anti-dsDNA, anti-SSA/SSB, cryptococcal Ag neg  f/u metal screen   --Appreciate neuro recs  -pt says headache has resolved   -MRI brain and spine pending , Valium 10 mg prior to MRI, pt refused MRI after IV ativan 0.25 mg first time  Neuro rec , -can increase cyclobenzaprine to 5 mg AM / 5 mg mid-day / 10 mg QHS  -given lyrica 100 mg AM / 150 mg PM x3 days, increase to pregabalin 150 mg bid  - dispo: pt left AMA, claims family emergency, didn't wait to sign paperwork

## 2023-09-27 NOTE — DIETITIAN INITIAL EVALUATION ADULT - HEIGHT FOR BMI (INCHES)
Discussed options of radiation including whole breast irradiation and partial breast irradiation.  Also discussed omitting radiation. Risks and benefits of each were also discussed. Patient declines radiation.   Follow up with Dr. Peace as scheduled.  Return to see me as needed.    0

## 2023-09-27 NOTE — PROGRESS NOTE ADULT - REASON FOR ADMISSION
Gait instability

## 2023-09-27 NOTE — DIETITIAN INITIAL EVALUATION ADULT - PROBLEM SELECTOR PLAN 5
Tylenol 500-1000 mg every 8 hours prn pain.   --C/w biktarvy  --c/w bactrim  --Obtain viral load and T Cell subset

## 2023-09-27 NOTE — PROGRESS NOTE ADULT - SUBJECTIVE AND OBJECTIVE BOX
Dr. Tracy Muniz  Pager [phone]    PROGRESS NOTE:     Patient is a 33y old  Male who presents with a chief complaint of Gait instability (26 Sep 2023 15:50)      SUBJECTIVE / OVERNIGHT EVENTS: denies chest pain or sob   ADDITIONAL REVIEW OF SYSTEMS: afebrile , pt left AMA after seen by me due to family emergency, didn't sign AMA paper    MEDICATIONS  (STANDING):  baclofen 10 milliGRAM(s) Oral every 8 hours  bictegravir 50 mG/emtricitabine 200 mG/tenofovir alafenamide 25 mG (BIKTARVY) 1 Tablet(s) Oral daily  cyanocobalamin 1000 MICROGram(s) Oral daily  cyclobenzaprine 10 milliGRAM(s) Oral <User Schedule>  cyclobenzaprine 5 milliGRAM(s) Oral <User Schedule>  diazepam    Tablet 10 milliGRAM(s) Oral once  enoxaparin Injectable 40 milliGRAM(s) SubCutaneous every 24 hours  lidocaine   4% Patch 1 Patch Transdermal every 24 hours  melatonin 3 milliGRAM(s) Oral at bedtime  pregabalin 150 milliGRAM(s) Oral two times a day  QUEtiapine 300 milliGRAM(s) Oral at bedtime  QUEtiapine 100 milliGRAM(s) Oral daily  trimethoprim  160 mG/sulfamethoxazole 800 mG 1 Tablet(s) Oral <User Schedule>    MEDICATIONS  (PRN):  acetaminophen     Tablet .. 650 milliGRAM(s) Oral every 6 hours PRN Mild Pain (1 - 3), Moderate Pain (4 - 6)  oxyCODONE    IR 5 milliGRAM(s) Oral every 6 hours PRN Severe Pain (7 - 10)      CAPILLARY BLOOD GLUCOSE        I&O's Summary    27 Sep 2023 07:01  -  27 Sep 2023 12:33  --------------------------------------------------------  IN: 200 mL / OUT: 300 mL / NET: -100 mL        PHYSICAL EXAM:  Vital Signs Last 24 Hrs  T(C): 37 (27 Sep 2023 05:30), Max: 37 (27 Sep 2023 05:30)  T(F): 98.6 (27 Sep 2023 05:30), Max: 98.6 (27 Sep 2023 05:30)  HR: 77 (27 Sep 2023 05:30) (75 - 77)  BP: 134/88 (27 Sep 2023 05:30) (119/69 - 134/88)  BP(mean): --  RR: 17 (27 Sep 2023 05:30) (17 - 17)  SpO2: 100% (27 Sep 2023 05:30) (100% - 100%)    Parameters below as of 27 Sep 2023 05:30  Patient On (Oxygen Delivery Method): room air      GENERAL: NAD,   HEAD:  Atraumatic, Normocephalic  EYES: EOMI, PERRLA, conjunctiva and sclera clear  NECK: Supple, No JVD  CHEST/LUNG: Clear to auscultation bilaterally; No wheeze  HEART: Regular rate and rhythm; No murmurs, rubs, or gallops  ABDOMEN: Soft, Nontender, Nondistended; Bowel sounds present  EXTREMITIES:  2+ Peripheral Pulses, No clubbing, cyanosis, or edema  PSYCH: AAOx3  NEUROLOGY:  b/l LE weakness     LABS:            RADIOLOGY & ADDITIONAL TESTS:  Results Reviewed:   Imaging Personally Reviewed:  Electrocardiogram Personally Reviewed:    COORDINATION OF CARE:  Care Discussed with Consultants/Other Providers [Y/N]: trena Nick, pt left AMA , didn't wait to sign paper  Prior or Outpatient Records Reviewed [Y/N]:

## 2023-09-27 NOTE — PROGRESS NOTE ADULT - ASSESSMENT
32yo M hx of  HIV on biktarvy, ?HIV myelopathy, chronic back pain, substance use, GBS? s/p influenza vaccine p/w headaches and gait instability.  
34yo M hx of  HIV on biktarvy, ?HIV myelopathy, chronic back pain, substance use, GBS? s/p influenza vaccine p/w headaches and gait instability.  
32yo M hx of  HIV on biktarvy, ?HIV myelopathy, chronic back pain, substance use, GBS? s/p influenza vaccine p/w headaches and gait instability.

## 2023-09-27 NOTE — DIETITIAN INITIAL EVALUATION ADULT - NSICDXPASTMEDICALHX_GEN_ALL_CORE_FT
PAST MEDICAL HISTORY:  Asthma     Chronic sinusitis     Closed fracture of multiple ribs of right side, initial encounter     Cocaine abuse     GBS (Guillain Cranberry syndrome)     HIV (human immunodeficiency virus infection) from birth    HIV disease     Homeless

## 2023-09-27 NOTE — PROGRESS NOTE ADULT - PROBLEM SELECTOR PROBLEM 4
Chronic back pain

## 2023-09-27 NOTE — PROGRESS NOTE ADULT - PROBLEM SELECTOR PLAN 6
--c/w home Seroquel  --At this time w/o SI/SA   --Psych follow up outpatient

## 2023-09-27 NOTE — PROGRESS NOTE ADULT - PROBLEM SELECTOR PLAN 2
--Mgt as above  --PT eval rec rehab
--Mgt as above  --PT Eval

## 2023-09-27 NOTE — PROGRESS NOTE ADULT - PROBLEM SELECTOR PLAN 3
--Denies dysuria, hematuria but w/ intermittent incontinence and retention  -- UA negative   --Rest of mgt as above
--Denies dysuria, hematuria but w/ intermittent incontinence and retention  --Check UA  --Rest of mgt as above
--Denies dysuria, hematuria but w/ intermittent incontinence and retention  -- UA negative   --Rest of mgt as above
--Denies dysuria, hematuria but w/ intermittent incontinence and retention  -- UA negative   --Rest of mgt as above

## 2023-09-27 NOTE — DIETITIAN INITIAL EVALUATION ADULT - PERTINENT LABORATORY DATA
A1C with Estimated Average Glucose Result: 4.5 % (09-22-23 @ 06:27)  A1C with Estimated Average Glucose Result: 4.3 % (12-08-22 @ 06:26)

## 2023-09-27 NOTE — PROGRESS NOTE ADULT - PROBLEM SELECTOR PROBLEM 3
Urinary incontinence

## 2023-09-27 NOTE — DIETITIAN INITIAL EVALUATION ADULT - PERTINENT MEDS FT
MEDICATIONS  (STANDING):  baclofen 10 milliGRAM(s) Oral every 8 hours  bictegravir 50 mG/emtricitabine 200 mG/tenofovir alafenamide 25 mG (BIKTARVY) 1 Tablet(s) Oral daily  cyanocobalamin 1000 MICROGram(s) Oral daily  cyclobenzaprine 10 milliGRAM(s) Oral <User Schedule>  cyclobenzaprine 5 milliGRAM(s) Oral <User Schedule>  diazepam    Tablet 10 milliGRAM(s) Oral once  enoxaparin Injectable 40 milliGRAM(s) SubCutaneous every 24 hours  lidocaine   4% Patch 1 Patch Transdermal every 24 hours  melatonin 3 milliGRAM(s) Oral at bedtime  pregabalin 150 milliGRAM(s) Oral two times a day  QUEtiapine 100 milliGRAM(s) Oral daily  QUEtiapine 300 milliGRAM(s) Oral at bedtime  trimethoprim  160 mG/sulfamethoxazole 800 mG 1 Tablet(s) Oral <User Schedule>    MEDICATIONS  (PRN):  acetaminophen     Tablet .. 650 milliGRAM(s) Oral every 6 hours PRN Mild Pain (1 - 3), Moderate Pain (4 - 6)  oxyCODONE    IR 5 milliGRAM(s) Oral every 6 hours PRN Severe Pain (7 - 10)

## 2023-09-27 NOTE — DIETITIAN INITIAL EVALUATION ADULT - OTHER INFO
34yo M hx of  HIV on biktarvy, ?HIV myelopathy, chronic back pain, substance use, GBS? s/p influenza vaccine p/w headaches and gait instability.    Pt seen and reports 75% intake of meals with No GI distress (nausea/vomiting/diarrhea/constipation.) at present. Pt with stable weights. Noted skin ecchymosis, no edema per nursing flow sheet.

## 2023-09-27 NOTE — PROGRESS NOTE ADULT - PROBLEM SELECTOR PLAN 5
--C/w biktarvy  --c/w bactrim  -- viral load and T Cell subset noted  viral load 220, CD4 = 276  - outpt f/u with ID
--C/w biktarvy  --c/w bactrim  -- viral load and T Cell subset noted  - outpt f/u with ID
--C/w biktarvy  --c/w bactrim  -- viral load and T Cell subset noted  - outpt f/u with ID
--C/w biktarvy  --c/w bactrim  --Obtain viral load and T Cell subset
--C/w biktarvy  --c/w bactrim  -- viral load and T Cell subset noted  viral load 220, CD4 = 276  - outpt f/u with ID
--C/w biktarvy  --c/w bactrim  -- viral load and T Cell subset noted  viral load 220, CD4 = 276  - outpt f/u with ID

## 2023-09-28 LAB
ARSENIC SERPL-MCNC: 3 UG/L — SIGNIFICANT CHANGE UP (ref 0–9)
CADMIUM SERPL-MCNC: <0.5 UG/L — SIGNIFICANT CHANGE UP (ref 0–1.2)
LEAD BLD-MCNC: <1 UG/DL — SIGNIFICANT CHANGE UP (ref 0–3.4)
MERCURY SERPL-MCNC: 1.8 UG/L — SIGNIFICANT CHANGE UP (ref 0–14.9)

## 2023-09-30 ENCOUNTER — INPATIENT (INPATIENT)
Facility: HOSPITAL | Age: 33
LOS: 2 days | Discharge: LEFT BEFORE DISCHARGE | DRG: 58 | End: 2023-10-03
Attending: STUDENT IN AN ORGANIZED HEALTH CARE EDUCATION/TRAINING PROGRAM | Admitting: INTERNAL MEDICINE
Payer: MEDICAID

## 2023-09-30 VITALS
TEMPERATURE: 98 F | RESPIRATION RATE: 17 BRPM | OXYGEN SATURATION: 98 % | SYSTOLIC BLOOD PRESSURE: 141 MMHG | HEART RATE: 86 BPM | HEIGHT: 72 IN | DIASTOLIC BLOOD PRESSURE: 80 MMHG | WEIGHT: 195.11 LBS

## 2023-09-30 DIAGNOSIS — Z98.890 OTHER SPECIFIED POSTPROCEDURAL STATES: Chronic | ICD-10-CM

## 2023-09-30 DIAGNOSIS — R29.898 OTHER SYMPTOMS AND SIGNS INVOLVING THE MUSCULOSKELETAL SYSTEM: ICD-10-CM

## 2023-09-30 LAB
ANION GAP SERPL CALC-SCNC: 3 MMOL/L — LOW (ref 5–17)
BASOPHILS # BLD AUTO: 0.02 K/UL — SIGNIFICANT CHANGE UP (ref 0–0.2)
BASOPHILS NFR BLD AUTO: 0.3 % — SIGNIFICANT CHANGE UP (ref 0–2)
BUN SERPL-MCNC: 16 MG/DL — SIGNIFICANT CHANGE UP (ref 7–23)
CALCIUM SERPL-MCNC: 9.4 MG/DL — SIGNIFICANT CHANGE UP (ref 8.5–10.1)
CHLORIDE SERPL-SCNC: 106 MMOL/L — SIGNIFICANT CHANGE UP (ref 96–108)
CO2 SERPL-SCNC: 29 MMOL/L — SIGNIFICANT CHANGE UP (ref 22–31)
CREAT SERPL-MCNC: 0.98 MG/DL — SIGNIFICANT CHANGE UP (ref 0.5–1.3)
EGFR: 104 ML/MIN/1.73M2 — SIGNIFICANT CHANGE UP
EOSINOPHIL # BLD AUTO: 0.02 K/UL — SIGNIFICANT CHANGE UP (ref 0–0.5)
EOSINOPHIL NFR BLD AUTO: 0.3 % — SIGNIFICANT CHANGE UP (ref 0–6)
GLUCOSE SERPL-MCNC: 87 MG/DL — SIGNIFICANT CHANGE UP (ref 70–99)
HCT VFR BLD CALC: 40.1 % — SIGNIFICANT CHANGE UP (ref 39–50)
HGB BLD-MCNC: 13.5 G/DL — SIGNIFICANT CHANGE UP (ref 13–17)
IMM GRANULOCYTES NFR BLD AUTO: 0 % — SIGNIFICANT CHANGE UP (ref 0–0.9)
LYMPHOCYTES # BLD AUTO: 2.23 K/UL — SIGNIFICANT CHANGE UP (ref 1–3.3)
LYMPHOCYTES # BLD AUTO: 37.5 % — SIGNIFICANT CHANGE UP (ref 13–44)
MAGNESIUM SERPL-MCNC: 1.6 MG/DL — SIGNIFICANT CHANGE UP (ref 1.6–2.6)
MCHC RBC-ENTMCNC: 30.4 PG — SIGNIFICANT CHANGE UP (ref 27–34)
MCHC RBC-ENTMCNC: 33.7 GM/DL — SIGNIFICANT CHANGE UP (ref 32–36)
MCV RBC AUTO: 90.3 FL — SIGNIFICANT CHANGE UP (ref 80–100)
MONOCYTES # BLD AUTO: 0.31 K/UL — SIGNIFICANT CHANGE UP (ref 0–0.9)
MONOCYTES NFR BLD AUTO: 5.2 % — SIGNIFICANT CHANGE UP (ref 2–14)
NEUTROPHILS # BLD AUTO: 3.37 K/UL — SIGNIFICANT CHANGE UP (ref 1.8–7.4)
NEUTROPHILS NFR BLD AUTO: 56.7 % — SIGNIFICANT CHANGE UP (ref 43–77)
PLATELET # BLD AUTO: 274 K/UL — SIGNIFICANT CHANGE UP (ref 150–400)
POTASSIUM SERPL-MCNC: 3.7 MMOL/L — SIGNIFICANT CHANGE UP (ref 3.5–5.3)
POTASSIUM SERPL-SCNC: 3.7 MMOL/L — SIGNIFICANT CHANGE UP (ref 3.5–5.3)
RBC # BLD: 4.44 M/UL — SIGNIFICANT CHANGE UP (ref 4.2–5.8)
RBC # FLD: 12.6 % — SIGNIFICANT CHANGE UP (ref 10.3–14.5)
SODIUM SERPL-SCNC: 138 MMOL/L — SIGNIFICANT CHANGE UP (ref 135–145)
WBC # BLD: 5.95 K/UL — SIGNIFICANT CHANGE UP (ref 3.8–10.5)
WBC # FLD AUTO: 5.95 K/UL — SIGNIFICANT CHANGE UP (ref 3.8–10.5)

## 2023-09-30 PROCEDURE — 97163 PT EVAL HIGH COMPLEX 45 MIN: CPT | Mod: GP

## 2023-09-30 PROCEDURE — G0378: CPT

## 2023-09-30 PROCEDURE — 76937 US GUIDE VASCULAR ACCESS: CPT | Mod: 26

## 2023-09-30 PROCEDURE — 97110 THERAPEUTIC EXERCISES: CPT | Mod: GO

## 2023-09-30 PROCEDURE — 99223 1ST HOSP IP/OBS HIGH 75: CPT

## 2023-09-30 PROCEDURE — 80053 COMPREHEN METABOLIC PANEL: CPT

## 2023-09-30 PROCEDURE — 99285 EMERGENCY DEPT VISIT HI MDM: CPT | Mod: 25

## 2023-09-30 PROCEDURE — 85027 COMPLETE CBC AUTOMATED: CPT

## 2023-09-30 PROCEDURE — 97166 OT EVAL MOD COMPLEX 45 MIN: CPT | Mod: GO

## 2023-09-30 PROCEDURE — 97530 THERAPEUTIC ACTIVITIES: CPT | Mod: GO

## 2023-09-30 PROCEDURE — 97116 GAIT TRAINING THERAPY: CPT | Mod: GP

## 2023-09-30 PROCEDURE — 80048 BASIC METABOLIC PNL TOTAL CA: CPT

## 2023-09-30 PROCEDURE — 82550 ASSAY OF CK (CPK): CPT

## 2023-09-30 PROCEDURE — 36415 COLL VENOUS BLD VENIPUNCTURE: CPT

## 2023-09-30 PROCEDURE — 36000 PLACE NEEDLE IN VEIN: CPT

## 2023-09-30 RX ORDER — PREGABALIN 225 MG/1
1000 CAPSULE ORAL DAILY
Refills: 0 | Status: DISCONTINUED | OUTPATIENT
Start: 2023-09-30 | End: 2023-10-03

## 2023-09-30 RX ORDER — BACLOFEN 100 %
5 POWDER (GRAM) MISCELLANEOUS EVERY 8 HOURS
Refills: 0 | Status: DISCONTINUED | OUTPATIENT
Start: 2023-09-30 | End: 2023-10-03

## 2023-09-30 RX ORDER — ONDANSETRON 8 MG/1
4 TABLET, FILM COATED ORAL EVERY 8 HOURS
Refills: 0 | Status: DISCONTINUED | OUTPATIENT
Start: 2023-09-30 | End: 2023-10-03

## 2023-09-30 RX ORDER — QUETIAPINE FUMARATE 200 MG/1
100 TABLET, FILM COATED ORAL DAILY
Refills: 0 | Status: DISCONTINUED | OUTPATIENT
Start: 2023-09-30 | End: 2023-10-03

## 2023-09-30 RX ORDER — INFLUENZA VIRUS VACCINE 15; 15; 15; 15 UG/.5ML; UG/.5ML; UG/.5ML; UG/.5ML
0.5 SUSPENSION INTRAMUSCULAR ONCE
Refills: 0 | Status: DISCONTINUED | OUTPATIENT
Start: 2023-09-30 | End: 2023-10-03

## 2023-09-30 RX ORDER — BICTEGRAVIR SODIUM, EMTRICITABINE, AND TENOFOVIR ALAFENAMIDE FUMARATE 30; 120; 15 MG/1; MG/1; MG/1
1 TABLET ORAL DAILY
Refills: 0 | Status: DISCONTINUED | OUTPATIENT
Start: 2023-09-30 | End: 2023-10-03

## 2023-09-30 RX ORDER — QUETIAPINE FUMARATE 200 MG/1
300 TABLET, FILM COATED ORAL AT BEDTIME
Refills: 0 | Status: DISCONTINUED | OUTPATIENT
Start: 2023-09-30 | End: 2023-10-03

## 2023-09-30 RX ORDER — LANOLIN ALCOHOL/MO/W.PET/CERES
3 CREAM (GRAM) TOPICAL AT BEDTIME
Refills: 0 | Status: DISCONTINUED | OUTPATIENT
Start: 2023-09-30 | End: 2023-10-03

## 2023-09-30 RX ORDER — OXYCODONE HYDROCHLORIDE 5 MG/1
10 TABLET ORAL EVERY 4 HOURS
Refills: 0 | Status: DISCONTINUED | OUTPATIENT
Start: 2023-09-30 | End: 2023-10-03

## 2023-09-30 RX ORDER — ACETAMINOPHEN 500 MG
975 TABLET ORAL EVERY 6 HOURS
Refills: 0 | Status: DISCONTINUED | OUTPATIENT
Start: 2023-09-30 | End: 2023-10-03

## 2023-09-30 RX ORDER — SODIUM CHLORIDE 9 MG/ML
1000 INJECTION INTRAMUSCULAR; INTRAVENOUS; SUBCUTANEOUS ONCE
Refills: 0 | Status: COMPLETED | OUTPATIENT
Start: 2023-09-30 | End: 2023-09-30

## 2023-09-30 RX ADMIN — SODIUM CHLORIDE 2000 MILLILITER(S): 9 INJECTION INTRAMUSCULAR; INTRAVENOUS; SUBCUTANEOUS at 04:19

## 2023-09-30 RX ADMIN — OXYCODONE HYDROCHLORIDE 10 MILLIGRAM(S): 5 TABLET ORAL at 12:42

## 2023-09-30 RX ADMIN — Medication 150 MILLIGRAM(S): at 22:18

## 2023-09-30 RX ADMIN — Medication 3 MILLIGRAM(S): at 22:20

## 2023-09-30 RX ADMIN — OXYCODONE HYDROCHLORIDE 10 MILLIGRAM(S): 5 TABLET ORAL at 12:50

## 2023-09-30 RX ADMIN — QUETIAPINE FUMARATE 300 MILLIGRAM(S): 200 TABLET, FILM COATED ORAL at 22:18

## 2023-09-30 RX ADMIN — PREGABALIN 1000 MICROGRAM(S): 225 CAPSULE ORAL at 12:42

## 2023-09-30 RX ADMIN — OXYCODONE HYDROCHLORIDE 10 MILLIGRAM(S): 5 TABLET ORAL at 16:43

## 2023-09-30 RX ADMIN — Medication 5 MILLIGRAM(S): at 22:19

## 2023-09-30 RX ADMIN — OXYCODONE HYDROCHLORIDE 10 MILLIGRAM(S): 5 TABLET ORAL at 16:45

## 2023-09-30 RX ADMIN — Medication 5 MILLIGRAM(S): at 13:01

## 2023-09-30 RX ADMIN — Medication 975 MILLIGRAM(S): at 22:19

## 2023-09-30 RX ADMIN — QUETIAPINE FUMARATE 100 MILLIGRAM(S): 200 TABLET, FILM COATED ORAL at 12:42

## 2023-09-30 RX ADMIN — Medication 975 MILLIGRAM(S): at 22:04

## 2023-09-30 NOTE — H&P ADULT - NSHPLABSRESULTS_GEN_ALL_CORE
13.5   5.95  )-----------( 274      ( 30 Sep 2023 03:22 )             40.1       09-30    138  |  106  |  16  ----------------------------<  87  3.7   |  29  |  0.98    Ca    9.4      30 Sep 2023 03:22  Mg     1.6     09-30

## 2023-09-30 NOTE — H&P ADULT - ASSESSMENT
34 yo male with Hx. of congenital HIV, chronic low back pain, Guillain Enigma syndrome diagnosed in 2018 post flu vaccine, gait instability  who is c/o worsening leg weakness, head ache  for which he was hospitalized in [facility]  on 9/21/23, had neurology consult , rec. MRI  C spine, he was referred for BRAYDEN  but he left the Hospital AMA. He was on his way back to [facility]  but he tripped and fell in the train station and he was brought to [facility]. ED. denies head injury, c/o L knee pain earlier but it improved.     assessment Dx:                       1. Leg weakness                       2. Guillain Enigma                       3. Drug abuse                       4. HIV     Plan:    admit to  medicine               medications: continue current medications as per med rec               VTEP: Heparin               Labs: cbc,bmp               Radiology: Xray  Had CTA brain  at [facility]  9/21/23               Cardiac diagnostics:  EKG               Consults: Neurology, PT                 Advance Directive: full code,                                  32 yo male with Hx. of congenital HIV, chronic low back pain, Guillain Leiter syndrome diagnosed in 2018 post flu vaccine, gait instability  who is c/o worsening leg weakness, head ache  for which he was hospitalized in [facility]  on 9/21/23, had neurology consult , rec. MRI  C spine, he was referred for BRYADEN  but he left the Hospital AMA. He was on his way back to [facility]  but he tripped and fell in the train station and he was brought to [facility]. ED. denies head injury, c/o L knee pain earlier but it improved.     assessment Dx:                       1. Leg weakness                       2. Guillain Leiter                       3. Drug abuse                       4. HIV     Plan:    admit to  medicine               medications: continue current medications as per med rec               VTEP: Heparin               Labs: cbc,bmp               Radiology: Xray  Had CTA brain  at [facility]  9/21/23               Cardiac diagnostics:  EKG               Consults: Neurology, PT                 Advance Directive: full code,                D/C planing to [facility].

## 2023-09-30 NOTE — ED PROVIDER NOTE - PROGRESS NOTE DETAILS
Case discussed with Dr. Champion, will admit for continued work-up of his weakness.  Sundar Trejo MD.

## 2023-09-30 NOTE — PATIENT PROFILE ADULT - FUNCTIONAL ASSESSMENT - BASIC MOBILITY 6.
2-calculated by average/Not able to assess (calculate score using [facility] averaging method)

## 2023-09-30 NOTE — PATIENT PROFILE ADULT - SW.
[id number] [id number]  History and Physical Update    Pt Name: Froylan Squires  MRN: [id number]  YOB: 1955  Date of evaluation: 8/2/2021    I have examined the patient and reviewed the H&P/Consult and there are no changes to the patient or plans.       Roderick Ham DPM  Electronically signed 8/2/2021 at 1:51 PM
social work

## 2023-09-30 NOTE — ED ADULT TRIAGE NOTE - CHIEF COMPLAINT QUOTE
Pt BIBEMS s/p fall while coming of train. - Headstrike, -LOC, -Blood thinner, Endorses b/l leg pain and left side 9/10. A&O x4, ambulates. PMHx Guillain-barré syndrome. HIV. Allergy Toradol. no NA Pt BIBEMS s/p fall while coming off train. - Headstrike, -LOC, -Blood thinner, Endorses b/l leg pain and left side 9/10. A&O x4, ambulates. PMHx Guillain-barré syndrome. HIV. Allergy Toradol. no NA

## 2023-09-30 NOTE — H&P ADULT - HISTORY OF PRESENT ILLNESS
HPI: The patient is a 34 yo male with Hx. of congenital HIV, chronic low back pain, Guillain [place] syndrome diagnosed in 2018 post flu vaccine, gait instability  who is c/o worsening leg weakness, head ache  for which he was hospitalized in [facility]  on 9/21/23, had neurology consult , rec. MRI  C spine, he was referred for BRAYDEN  but he left the Hospital AMA. He was on his way back to [facility]  but he tripped and fell in the train station and he was brought to [facility]. ED. denies head injury, c/o L knee pain.    PMHx: HIV from birth, Chronic sinusitis,  GBS, Cocaine abuse,    PSHx: left arm surgery    Family Hx: not available does not know    Social Hx.: not smoking, no alcohol use

## 2023-09-30 NOTE — H&P ADULT - NSHPREVIEWOFSYSTEMS_GEN_ALL_CORE
ROS: as above   Eyes: no changes in vision  ENT/Mouth: no changes    Cardiovascular: no chest pain    Respiratory: no SOB    Gastrointestinal: no diarrhea, no nausea, no vomiting    Genitourinary: no dysuria    Breast: no pain    Musculoskeletal: left knee contusion, leg weakness    Integumentary: no itching    Neurological: No Headache, no tremor, leg weakness.    Endocrine: no excessive thirst,     Allergic/Immunologic: no itching

## 2023-09-30 NOTE — ED PROVIDER NOTE - CLINICAL SUMMARY MEDICAL DECISION MAKING FREE TEXT BOX
Patient with incomplete work-up for his gait instability in outside hospital.  Presenting with desire for further work-up.  Also interested in getting placement for subacute rehab pending his work-up.  No head injury to suggest need for CT head or C-spine at this time.  Other areas likely more musculoskeletal in origin.  Will check lab work, plan for admission for continued work-up.  Given 3 weeks of symptoms, do not suspect acute spinal cord pathology at this time though he will need continued work-up

## 2023-09-30 NOTE — ED PROVIDER NOTE - OBJECTIVE STATEMENT
Patient with past medical history of congenital HIV, chronic back pain, previous history of Guillain-Barré is presenting with difficulty with ambulation.  States he has been having this symptom for the past few weeks.  Was seen in outside hospital, had plan to get MRI and then go to subacute rehab though he had a family emergency so had to leave the hospital AGAINST MEDICAL ADVICE.  Upon trying to get back to [facility] today, he tripped at the train station so was brought to [facility].  Did not hit his head or have loss conscious.  States he landed on his left knee and bilateral hands.  Denies pain to those areas at this time.

## 2023-09-30 NOTE — ED ADULT NURSE NOTE - OBJECTIVE STATEMENT
The patient is a 33y Male complaining of fall. Pt has a hx of congenital HIV, Guilliam-Rule. Pt endorses slipping at train station. Pt is GCS 15, HEENT clear, PERRL, PMSx4. Labs drawn.

## 2023-09-30 NOTE — H&P ADULT - NSHPPHYSICALEXAM_GEN_ALL_CORE
Physical Exam: Vital Signs Last 24 Hrs  T(C): 36.6 (30 Sep 2023 08:06), Max: 36.7 (30 Sep 2023 02:32)  T(F): 97.9 (30 Sep 2023 08:06), Max: 98 (30 Sep 2023 02:32)  HR: 95 (30 Sep 2023 08:06) (86 - 95)  BP: 137/74 (30 Sep 2023 08:06) (137/74 - 141/80)  BP(mean): --  RR: 18 (30 Sep 2023 08:06) (17 - 18)  SpO2: 99% (30 Sep 2023 08:06) (98% - 100%)    Parameters below as of 30 Sep 2023 08:06  Patient On (Oxygen Delivery Method): room air            HEENT: PRRL EOMI    MOUTH/TEETH/GUMS: Clear    NECK: no JVD    LUNGS: Clear    HEART: S1,S2 RR    ABDOMEN: soft nontender    EXTREMITIES:  no pedal edema    MUSCULOSKELETAL: no joint swelling     NEURO: no tremor, no focal signs. bilat lower extremities weakness    SKIN: no rash    : CVA negative,

## 2023-09-30 NOTE — ED ADULT NURSE NOTE - NSFALLHARMRISKINTERV_ED_ALL_ED

## 2023-09-30 NOTE — ED ADULT NURSE NOTE - NS ED NURSE REPORT GIVEN DT
Vitals: HTN, otherwise WNL  Gen: AAOx1, NAD, sitting comfortably in stretcher  Head: ncat, perrla, eomi b/l  Neck: supple, no lymphadenopathy, no midline deviation  Heart: rrr, no m/r/g  Lungs: CTA b/l, no rales/ronchi/wheezes  Abd: soft, nontender, non-distended, no rebound or guarding  Ext: no clubbing/cyanosis/edema  Neuro: sensation and muscle strength intact b/l, steady gait 30-Sep-2023 05:32

## 2023-09-30 NOTE — ED PROVIDER NOTE - PHYSICAL EXAMINATION
Constitutional: Awake, Alert, non-toxic. No acute distress.  HEAD: Normocephalic, atraumatic.   EYES: PERRL, EOM intact, conjunctiva and sclera are clear bilaterally.  ENT: External ears normal. No rhinorrhea, no tracheal deviation   NECK: Supple, non-tender  CARDIOVASCULAR: regular rate and rhythm.  RESPIRATORY: Normal respiratory effort; breath sounds CTAB, no wheezes, rhonchi, or rales. Speaking in full sentences. No accessory muscle use.   ABDOMEN: Soft; non-tender, non-distended. No rebound or guarding.   MSK:  no lower extremity edema, no deformities  SKIN: Warm, dry  NEURO: A&O x3. normal strength/sensation in b/l upper extremities, able to lift both legs off the bed. Speech is normal. No facial droop.  PSYCH: Appearance and judgement seem appropriate for gender and age.

## 2023-09-30 NOTE — ED ADULT NURSE NOTE - CHIEF COMPLAINT QUOTE
Pt BIBEMS s/p fall while coming off train. - Headstrike, -LOC, -Blood thinner, Endorses b/l leg pain and left side 9/10. A&O x4, ambulates. PMHx Guillain-barré syndrome. HIV. Allergy Toradol. no NA

## 2023-09-30 NOTE — PATIENT PROFILE ADULT - FALL HARM RISK - HARM RISK INTERVENTIONS

## 2023-10-01 LAB
ANION GAP SERPL CALC-SCNC: 6 MMOL/L — SIGNIFICANT CHANGE UP (ref 5–17)
BUN SERPL-MCNC: 8 MG/DL — SIGNIFICANT CHANGE UP (ref 7–23)
CALCIUM SERPL-MCNC: 8.6 MG/DL — SIGNIFICANT CHANGE UP (ref 8.5–10.1)
CHLORIDE SERPL-SCNC: 109 MMOL/L — HIGH (ref 96–108)
CO2 SERPL-SCNC: 23 MMOL/L — SIGNIFICANT CHANGE UP (ref 22–31)
CREAT SERPL-MCNC: 0.79 MG/DL — SIGNIFICANT CHANGE UP (ref 0.5–1.3)
EGFR: 120 ML/MIN/1.73M2 — SIGNIFICANT CHANGE UP
GLUCOSE SERPL-MCNC: 74 MG/DL — SIGNIFICANT CHANGE UP (ref 70–99)
HCT VFR BLD CALC: 37.8 % — LOW (ref 39–50)
HGB BLD-MCNC: 12.5 G/DL — LOW (ref 13–17)
MCHC RBC-ENTMCNC: 30 PG — SIGNIFICANT CHANGE UP (ref 27–34)
MCHC RBC-ENTMCNC: 33.1 GM/DL — SIGNIFICANT CHANGE UP (ref 32–36)
MCV RBC AUTO: 90.9 FL — SIGNIFICANT CHANGE UP (ref 80–100)
PLATELET # BLD AUTO: 157 K/UL — SIGNIFICANT CHANGE UP (ref 150–400)
POTASSIUM SERPL-MCNC: 3.6 MMOL/L — SIGNIFICANT CHANGE UP (ref 3.5–5.3)
POTASSIUM SERPL-SCNC: 3.6 MMOL/L — SIGNIFICANT CHANGE UP (ref 3.5–5.3)
RBC # BLD: 4.16 M/UL — LOW (ref 4.2–5.8)
RBC # FLD: 12.5 % — SIGNIFICANT CHANGE UP (ref 10.3–14.5)
SODIUM SERPL-SCNC: 138 MMOL/L — SIGNIFICANT CHANGE UP (ref 135–145)
WBC # BLD: 3.33 K/UL — LOW (ref 3.8–10.5)
WBC # FLD AUTO: 3.33 K/UL — LOW (ref 3.8–10.5)

## 2023-10-01 PROCEDURE — 99223 1ST HOSP IP/OBS HIGH 75: CPT

## 2023-10-01 PROCEDURE — 99232 SBSQ HOSP IP/OBS MODERATE 35: CPT

## 2023-10-01 RX ADMIN — OXYCODONE HYDROCHLORIDE 10 MILLIGRAM(S): 5 TABLET ORAL at 05:53

## 2023-10-01 RX ADMIN — Medication 150 MILLIGRAM(S): at 21:59

## 2023-10-01 RX ADMIN — Medication 3 MILLIGRAM(S): at 22:02

## 2023-10-01 RX ADMIN — QUETIAPINE FUMARATE 100 MILLIGRAM(S): 200 TABLET, FILM COATED ORAL at 10:07

## 2023-10-01 RX ADMIN — OXYCODONE HYDROCHLORIDE 10 MILLIGRAM(S): 5 TABLET ORAL at 06:38

## 2023-10-01 RX ADMIN — BICTEGRAVIR SODIUM, EMTRICITABINE, AND TENOFOVIR ALAFENAMIDE FUMARATE 1 TABLET(S): 30; 120; 15 TABLET ORAL at 10:07

## 2023-10-01 RX ADMIN — QUETIAPINE FUMARATE 300 MILLIGRAM(S): 200 TABLET, FILM COATED ORAL at 21:59

## 2023-10-01 RX ADMIN — Medication 150 MILLIGRAM(S): at 10:06

## 2023-10-01 RX ADMIN — Medication 5 MILLIGRAM(S): at 05:45

## 2023-10-01 RX ADMIN — Medication 5 MILLIGRAM(S): at 21:59

## 2023-10-01 RX ADMIN — Medication 5 MILLIGRAM(S): at 13:42

## 2023-10-01 RX ADMIN — PREGABALIN 1000 MICROGRAM(S): 225 CAPSULE ORAL at 10:07

## 2023-10-01 NOTE — PROGRESS NOTE ADULT - SUBJECTIVE AND OBJECTIVE BOX
HOSPITALIST ATTENDING PROGRESS NOTE    Chart and meds reviewed.  Patient seen and examined.    Subjective: patient reports lower back pain with radiculopathy bilaterally. Discussed with neurology, Will order lumbar MRI. Continue to work with PT. Denies any shortness of breath.     All other systems reviewed and found to be negative with the exception of what has been described above.    MEDICATIONS  (STANDING):  baclofen 5 milliGRAM(s) Oral every 8 hours  bictegravir 50 mG/emtricitabine 200 mG/tenofovir alafenamide 25 mG (BIKTARVY) 1 Tablet(s) Oral daily  cyanocobalamin 1000 MICROGram(s) Oral daily  influenza   Vaccine 0.5 milliLiter(s) IntraMuscular once  LORazepam    IVPB 1 milliGRAM(s) IV Intermittent once  pregabalin 150 milliGRAM(s) Oral two times a day  QUEtiapine 100 milliGRAM(s) Oral daily  QUEtiapine 300 milliGRAM(s) Oral at bedtime    MEDICATIONS  (PRN):  acetaminophen     Tablet .. 975 milliGRAM(s) Oral every 6 hours PRN Mild Pain (1 - 3)  aluminum hydroxide/magnesium hydroxide/simethicone Suspension 30 milliLiter(s) Oral every 4 hours PRN Dyspepsia  melatonin 3 milliGRAM(s) Oral at bedtime PRN Insomnia  ondansetron Injectable 4 milliGRAM(s) IV Push every 8 hours PRN Nausea and/or Vomiting  oxyCODONE    IR 10 milliGRAM(s) Oral every 4 hours PRN Severe Pain (7 - 10)      VITALS:  T(F): 97.5 (10-01-23 @ 07:48), Max: 98.2 (09-30-23 @ 16:15)  HR: 86 (10-01-23 @ 07:48) (67 - 89)  BP: 106/57 (10-01-23 @ 07:48) (106/57 - 143/82)  RR: 18 (10-01-23 @ 07:48) (18 - 18)  SpO2: 100% (10-01-23 @ 07:48) (100% - 100%)  Wt(kg): --    I&O's Summary      CAPILLARY BLOOD GLUCOSE          PHYSICAL EXAM:  Gen: AAOX3 No acute distresss  HEENT: PERRLA EOMI  MOUTH/TEETH/GUMS: Clear  NECK: no JVD  LUNGS: CTA bilaterally no wheezing appreciated.   HEART: S1,S2 RR  ABDOMEN: soft  non distended nontender  EXTREMITIES:  no pedal edema  MUSCULOSKELETAL: no joint swelling   NEURO: no tremor, no focal signs. bilat lower extremities weakness  SKIN: no rash  : CVA negative,    LABS:                            12.5   3.33  )-----------( 157      ( 01 Oct 2023 08:10 )             37.8     10-01    138  |  109<H>  |  8   ----------------------------<  74  3.6   |  23  |  0.79    Ca    8.6      01 Oct 2023 08:10  Mg     1.6     09-30              Urinalysis Basic - ( 01 Oct 2023 08:10 )    Color: x / Appearance: x / SG: x / pH: x  Gluc: 74 mg/dL / Ketone: x  / Bili: x / Urobili: x   Blood: x / Protein: x / Nitrite: x   Leuk Esterase: x / RBC: x / WBC x   Sq Epi: x / Non Sq Epi: x / Bacteria: x              CULTURES:      Additional results/Imaging, I have personally reviewed:    Telemetry, personally reviewed:

## 2023-10-01 NOTE — PROGRESS NOTE ADULT - ASSESSMENT
32 yo male with Hx. of congenital HIV, chronic low back pain, Guillain [employer] syndrome diagnosed in 2018 post flu vaccine, gait instability  who is c/o worsening leg weakness, head ache  for which he was hospitalized in [facility]  on 9/21/23, had neurology consult , rec. MRI  C spine, he was referred for BRAYDEN  but he left the Hospital AMA. He was on his way back to [facility]  but he tripped and fell in the train station and he was brought to [facility]. ED. denies head injury, c/o L knee pain.      Difficulty ambulation with fall  Hx GuillanBarre  Denies Head injury  admit to  medicine  MRI Lumbar  Neurology consulted, recommendations appreciated  PT consulted,       Code status Full Code  DVT ppx - Heparin

## 2023-10-01 NOTE — CONSULT NOTE ADULT - ASSESSMENT
32yo M PMHx con HIV on biktarvy, HIV myelopathy, chronic back pain, substance use, GBS? s/p influenza vaccine presents to ED with c/o worsening leg weakness and head ache. Pt was recently hospitalized in [facility]  9/21/23, seen by neuro, CVA ruled out, MRI brain - C spine ordered - were not done, as he left the Hospital AMA, reports he had to attend to his daughter. He was on his way back to [facility] he tripped and fell in the train station, was brought to ED.     Patient has chronic neck mid and low back pains, intermittent stiffness in the neck, had difficulty with his balance, is able to ambulate independently, balance and gait have worsened recently. He had had extensive w/u and admissions at multiple [facility] for fluctuating/worsening lower extremity weakness, urinary incontinence, MRI thoracic and lumbar spine in 7/2023, ruled out cauda equina/thoracic or lumbar cord lesions      # Severe low back pain, giveaway weakness left leg due to pain    # Gait imbalance post GBS    # Chronic neck pain    – Will obtain MRI cervical spine with [place] and MRI brain with and without contrast, rule out any mass lesions or new enhancing lesions  – PT/OT  – Pain management    D/W pt and Dr. Cedeno

## 2023-10-01 NOTE — CONSULT NOTE ADULT - SUBJECTIVE AND OBJECTIVE BOX
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 s/p influenza vaccine presents to ED with c/o worsening leg weakness and head ache. Pt was recently hospitalized in [facility]  9/21/23, seen by neurology, CVA ruled out, MRI brain - C spine, were not done, he was referred for BRAYDEN, he left the Hospital AMA as he reports he had to attend to his daughter. He was on his way back to [facility] he tripped and fell in the train station, was brought to ED.       Patient has complaints of chronic neck mid and low back pains, he reports intermittent stiffness in the neck, he reports he has had difficulty with his balance and gait following GBS but is able to ambulate independently, his balance and gait have worsened recently.  Per chart review he has been seen at multiple [facility] for fluctuating/worsening lower extremity weakness, urinary retention occasional incontinence, saddle anesthesia, he has had MRI thoracic and lumbar spine in 7/2023, cauda equina/thoracic or lumbar cord lesions were ruled out.  He has followed up with rheumatology as well.    At present, patient only complains of severe low back pain, his neck pain has improved since admission, he reports his left lower extremity feels weaker due to low back pain.      PAST MEDICAL & SURGICAL HISTORY:  HIV (human immunodeficiency virus infection) from birth  Asthma  Closed fracture of multiple ribs of right side, initial encounter  Cocaine abuse  Chronic sinusitis  Homeless  GBS (Guillain [place] syndrome)  HIV disease  History of orthopedic surgery left arm      FAMILY HISTORY:  FH: HIV infection (Mother)      Social Hx:  Nonsmoker, Cocaine use, denies alcohol use       MEDICATIONS  (STANDING):  baclofen 5 milliGRAM(s) Oral every 8 hours  bictegravir 50 mG/emtricitabine 200 mG/tenofovir alafenamide 25 mG (BIKTARVY) 1 Tablet(s) Oral daily  cyanocobalamin 1000 MICROGram(s) Oral daily  influenza   Vaccine 0.5 milliLiter(s) IntraMuscular once  LORazepam   Injectable 1 milliGRAM(s) IV Push once  pregabalin 150 milliGRAM(s) Oral two times a day  QUEtiapine 100 milliGRAM(s) Oral daily  QUEtiapine 300 milliGRAM(s) Oral at bedtime       Allergies  Ceclor (Unknown)  Toradol (Swelling)  Toradol (Anaphylaxis; Swelling)    Intolerances      ROS: Pertinent positives in HPI, all other ROS were reviewed and are negative.        Vital Signs Last 24 Hrs  T(C): 36.4 (01 Oct 2023 07:48), Max: 36.8 (30 Sep 2023 16:15)  T(F): 97.5 (01 Oct 2023 07:48), Max: 98.2 (30 Sep 2023 16:15)  HR: 86 (01 Oct 2023 07:48) (67 - 89)  BP: 106/57 (01 Oct 2023 07:48) (106/57 - 143/82)  BP(mean): --  RR: 18 (01 Oct 2023 07:48) (18 - 18)  SpO2: 100% (01 Oct 2023 07:48) (100% - 100%)    Parameters below as of 01 Oct 2023 07:48  Patient On (Oxygen Delivery Method): room air      Gen exam:  Normocephalic, childish demeanor, sucking his thumb, awake and alert.  HEENT: PERRLA, EOMI,   Neck: Supple.  Respiratory: Breath sounds are clear bilaterally  Cardiovascular: S1 and S2, regular  Extremities:  no edema  Vascular: Caritid Bruit - no  Musculoskeletal: no abnormal movements  Skin: Brittle nails      Neurological exam:  HF: Awake, alert, follows simple commands, participates in exam inconsistently, O x time, hospital, year, Speech fluent, No Aphasia or paraphasic errors. Naming /repetition intact   CN: JOCELYN, EOMI, VFF, facial sensation normal, no NLFD, tongue midline, Palate moves equally  Motor: No pronator drift, Inconsistent strength, UE antigravity, LLE 4/5 limited due to LBP, RLE 4+/5, no tremor, rigidity   Sens: Intact to light touch / PP  Reflexes: Symmetric 3+ UE/KJ, AJ 2+, upgoing toes, no ankle clonus, hoffmans NEG  Coord:  No FNFA, dysmetria, AMAN intact   Gait/Balance: Cannot test      Labs:   10-01    138  |  109<H>  |  8   ----------------------------<  74  3.6   |  23  |  0.79    Ca    8.6      01 Oct 2023 08:10  Mg     1.6     09-30      09-22 Chol 194 LDL -- HDL 42 Trig 90                          12.5   3.33  )-----------( 157      ( 01 Oct 2023 08:10 )             37.8       Radiology:  < from: CT Angio Neck Stroke Protocol w/ IV Cont (09.21.23 @ 01:24) >    CT ANGIOGRAPHY NECK:  1.  The cervical vasculature is patent without evidence of   atherosclerosis, stenosis or dissection.  The left vertebral artery   arises directly off the aorta.  The right vertebral artery is dominant.  2.  The adenoids are moderately to severely enlarged.  The palatine and   lingual tonsils are mildly enlarged.    CT ANGIOGRAPHY BRAIN: No vessel occlusion, flow-limiting stenosis or   aneurysm about the [place].  No evidence of an arteriovenous   malformation.    CT VENOGRAM BRAIN: No evidence of dural venous sinus thrombosis.  The   superficial and deep venous systems are patent without evidence of   intraluminal thrombus.      < from: MR Thoracic/Lumbar Spine w/wo IV Cont (07.07.23 @ 13:09) >  LOWER THORACIC SPINE: No spinal canal or neuroforaminal stenosis.    L1-L2: No spinal canal or neuroforaminal stenosis.  L2-L3: No spinal canal or neuroforaminal stenosis.  L3-L4: No spinal canal or neuroforaminal stenosis.  L4-L5: No spinal canal or neuroforaminal stenosis.  L5-S1: No spinal canal or neuroforaminal stenosis.      IMPRESSION:  No interval change from prior thoracic and lumbar spine MRIs of 9/2/2022  No cord or cauda equina compression. No cord signal abnormality or   abnormal enhancement.

## 2023-10-02 LAB
ANION GAP SERPL CALC-SCNC: 7 MMOL/L — SIGNIFICANT CHANGE UP (ref 5–17)
BUN SERPL-MCNC: 12 MG/DL — SIGNIFICANT CHANGE UP (ref 7–23)
CALCIUM SERPL-MCNC: 9.1 MG/DL — SIGNIFICANT CHANGE UP (ref 8.5–10.1)
CHLORIDE SERPL-SCNC: 110 MMOL/L — HIGH (ref 96–108)
CK SERPL-CCNC: 117 U/L — SIGNIFICANT CHANGE UP (ref 26–308)
CO2 SERPL-SCNC: 22 MMOL/L — SIGNIFICANT CHANGE UP (ref 22–31)
CREAT SERPL-MCNC: 0.96 MG/DL — SIGNIFICANT CHANGE UP (ref 0.5–1.3)
EGFR: 106 ML/MIN/1.73M2 — SIGNIFICANT CHANGE UP
GLUCOSE SERPL-MCNC: 141 MG/DL — HIGH (ref 70–99)
HCT VFR BLD CALC: 39.6 % — SIGNIFICANT CHANGE UP (ref 39–50)
HGB BLD-MCNC: 13 G/DL — SIGNIFICANT CHANGE UP (ref 13–17)
MCHC RBC-ENTMCNC: 29.3 PG — SIGNIFICANT CHANGE UP (ref 27–34)
MCHC RBC-ENTMCNC: 32.8 GM/DL — SIGNIFICANT CHANGE UP (ref 32–36)
MCV RBC AUTO: 89.4 FL — SIGNIFICANT CHANGE UP (ref 80–100)
PLATELET # BLD AUTO: 263 K/UL — SIGNIFICANT CHANGE UP (ref 150–400)
POTASSIUM SERPL-MCNC: 3.7 MMOL/L — SIGNIFICANT CHANGE UP (ref 3.5–5.3)
POTASSIUM SERPL-SCNC: 3.7 MMOL/L — SIGNIFICANT CHANGE UP (ref 3.5–5.3)
RBC # BLD: 4.43 M/UL — SIGNIFICANT CHANGE UP (ref 4.2–5.8)
RBC # FLD: 12.4 % — SIGNIFICANT CHANGE UP (ref 10.3–14.5)
SODIUM SERPL-SCNC: 139 MMOL/L — SIGNIFICANT CHANGE UP (ref 135–145)
WBC # BLD: 4.01 K/UL — SIGNIFICANT CHANGE UP (ref 3.8–10.5)
WBC # FLD AUTO: 4.01 K/UL — SIGNIFICANT CHANGE UP (ref 3.8–10.5)

## 2023-10-02 PROCEDURE — 99232 SBSQ HOSP IP/OBS MODERATE 35: CPT

## 2023-10-02 PROCEDURE — 99221 1ST HOSP IP/OBS SF/LOW 40: CPT

## 2023-10-02 RX ADMIN — Medication 5 MILLIGRAM(S): at 23:11

## 2023-10-02 RX ADMIN — OXYCODONE HYDROCHLORIDE 10 MILLIGRAM(S): 5 TABLET ORAL at 06:07

## 2023-10-02 RX ADMIN — QUETIAPINE FUMARATE 100 MILLIGRAM(S): 200 TABLET, FILM COATED ORAL at 11:08

## 2023-10-02 RX ADMIN — Medication 1 MILLIGRAM(S): at 14:48

## 2023-10-02 RX ADMIN — QUETIAPINE FUMARATE 300 MILLIGRAM(S): 200 TABLET, FILM COATED ORAL at 23:11

## 2023-10-02 RX ADMIN — PREGABALIN 1000 MICROGRAM(S): 225 CAPSULE ORAL at 11:09

## 2023-10-02 RX ADMIN — Medication 5 MILLIGRAM(S): at 17:02

## 2023-10-02 RX ADMIN — Medication 5 MILLIGRAM(S): at 05:32

## 2023-10-02 RX ADMIN — Medication 150 MILLIGRAM(S): at 11:09

## 2023-10-02 RX ADMIN — OXYCODONE HYDROCHLORIDE 10 MILLIGRAM(S): 5 TABLET ORAL at 05:37

## 2023-10-02 RX ADMIN — Medication 150 MILLIGRAM(S): at 23:10

## 2023-10-02 RX ADMIN — BICTEGRAVIR SODIUM, EMTRICITABINE, AND TENOFOVIR ALAFENAMIDE FUMARATE 1 TABLET(S): 30; 120; 15 TABLET ORAL at 11:09

## 2023-10-02 NOTE — OCCUPATIONAL THERAPY INITIAL EVALUATION ADULT - PERTINENT HX OF CURRENT PROBLEM, REHAB EVAL
Pt is a 32 y/o male with PMHx of congenital HIV, chronic low back pain, Guillain [place] syndrome diagnosed in 2018 post flu vaccine, gait instability  who is c/o worsening leg weakness, head ache  for which he was hospitalized in [facility]  on 9/21/23, had neurology consult , rec. MRI  C spine, he was referred for BRAYDEN  but he left the Hospital AMA. He was on his way back to [facility]  but he tripped and fell in the train station and he was brought to [facility]. ED. denies head injury, c/o L knee pain. Per neuro consult note- "Per chart review he has been seen at multiple [facility] for fluctuating/worsening lower extremity weakness, urinary retention occasional incontinence, saddle anesthesia, he has had MRI thoracic and lumbar spine in 7/2023, cauda equina/thoracic or lumbar cord lesions were ruled out.  He has followed up with rheumatology as well". MRI head and spine ordered.

## 2023-10-02 NOTE — OCCUPATIONAL THERAPY INITIAL EVALUATION ADULT - NSACTIVITYREC_GEN_A_OT
Pt presents with impaired balance, endurance and muscle strength that will benefit from skilled OT to improve independence in ADLs, reduce fall risk and chance for readmission. Patient educated regarding fall prevention and home/hospital safety. Pt educated on use of AE/DME recommended for safety & the need to call for assistance. Patient with good understanding. Role of OT and patient goals discussed. Pt presents with impaired balance, endurance and muscle strength that will benefit from skilled OT to improve independence in ADLs, reduce fall risk and chance for readmission. Patient educated regarding fall prevention and home/hospital safety. Pt educated on use of AE/DME recommended for safety & the need to call for assistance. Patient with good understanding. Role of OT and patient goals discussed.  Recommend following up with an ophthalmologist.

## 2023-10-02 NOTE — PHYSICAL THERAPY INITIAL EVALUATION ADULT - PERTINENT HX OF CURRENT PROBLEM, REHAB EVAL
Pt admitted to  secondary to gait impairment, bilateral LE weakness. Pt with recent hospitalization at [facility] 9/21/2023 for worsening bilateral LE weakness, and HA. Pt was supposed to go to rehab but left AMA. Pt decided to go back to [facility] but tripped and fell at train station and was brought to  ED. Pt with a hx of congential HIV, GBS 2018, and chronic c-spine and l-spine pain. MRI c-spine ordered. Pt admitted to  secondary to gait impairment, bilateral LE weakness. Pt with recent hospitalization at [facility] 9/21/2023 for worsening bilateral LE weakness, and HA. Pt was supposed to go to rehab but left AMA. Pt decided to go back to [facility] but tripped and fell at train station and was brought to  ED. Pt with a hx of congential HIV, GBS 2018, and chronic c-spine and l-spine pain. MRI c-spine ordered. Pt with bilateral foot drop Left>right. Pt attempted AFO left foot at [facility], but was never given one.

## 2023-10-02 NOTE — PHYSICAL THERAPY INITIAL EVALUATION ADULT - MANUAL MUSCLE TESTING RESULTS, REHAB EVAL
Bilateral UE strength 4/5 throughout grossly. Right LE strength 3-3+/5 throughout grossly DF 3/5. Left LE 2+/5 throughout grossly, and DF 2/5.

## 2023-10-02 NOTE — PHYSICAL THERAPY INITIAL EVALUATION ADULT - GAIT PATTERN USED, PT EVAL
Pt with circumduction and hip hiking bilaterally left>right. Pt also with increase bilateral hip and knee flex left>right.  Deviations secondary to bilateral LE weakness and bilateral DF weakness left>right .

## 2023-10-02 NOTE — PROGRESS NOTE ADULT - SUBJECTIVE AND OBJECTIVE BOX
No acute events overnight, no new complaints.  Still with LLE weakness, chronic LBP with subjective radiculopathy    ROS: As stated above otherwise negative    MEDICATIONS  (STANDING):  baclofen 5 milliGRAM(s) Oral every 8 hours  bictegravir 50 mG/emtricitabine 200 mG/tenofovir alafenamide 25 mG (BIKTARVY) 1 Tablet(s) Oral daily  cyanocobalamin 1000 MICROGram(s) Oral daily  influenza   Vaccine 0.5 milliLiter(s) IntraMuscular once  LORazepam   Injectable 1 milliGRAM(s) IV Push once  pregabalin 150 milliGRAM(s) Oral two times a day  QUEtiapine 100 milliGRAM(s) Oral daily  QUEtiapine 300 milliGRAM(s) Oral at bedtime      Vital Signs Last 24 Hrs  T(C): 36.8 (02 Oct 2023 07:59), Max: 36.8 (01 Oct 2023 17:00)  T(F): 98.2 (02 Oct 2023 07:59), Max: 98.2 (01 Oct 2023 17:00)  HR: 100 (02 Oct 2023 07:59) (94 - 100)  BP: 116/56 (02 Oct 2023 07:59) (116/56 - 136/80)  BP(mean): --  RR: 18 (02 Oct 2023 07:59) (18 - 18)  SpO2: 97% (02 Oct 2023 07:59) (96% - 99%)      Neurological exam:  HF: Awake, alert, follows simple commands, participates in exam inconsistently, O x time, hospital, year, Speech fluent, No Aphasia or paraphasic errors. Naming /repetition intact   CN: JOCELYN, EOMI, VFF, facial sensation normal, no NLFD, tongue midline, Palate moves equally  Motor: No pronator drift, UE 5/5 LLE 4/5 limited due to LBP, RLE 4+/5, no tremor, rigidity   Sens: Intact to light touch / PP  Reflexes:  3+ UE/KJ, AJ absent b/l, Left plantar upgoing toes, no ankle clonus, hoffmans NEG  Coord:  No FNFA, dysmetria, AMAN intact   Gait/Balance: Cannot test                        13.0   4.01  )-----------( 263      ( 02 Oct 2023 08:47 )             39.6     10-02    139  |  110<H>  |  12  ----------------------------<  141<H>  3.7   |  22  |  0.96    Ca    9.1      02 Oct 2023 07:24        09-22 Chol 194 LDL -- HDL 42 Trig 90    Radiology report:  CT Angio Neck Stroke Protocol w/ IV Cont (09.21.23 @ 01:24)     CT ANGIOGRAPHY NECK:  1.  The cervical vasculature is patent without evidence of   atherosclerosis, stenosis or dissection.  The left vertebral artery   arises directly off the aorta.  The right vertebral artery is dominant.  2.  The adenoids are moderately to severely enlarged.  The palatine and   lingual tonsils are mildly enlarged.    CT ANGIOGRAPHY BRAIN: No vessel occlusion, flow-limiting stenosis or   aneurysm about the [place].  No evidence of an arteriovenous   malformation.    CT VENOGRAM BRAIN: No evidence of dural venous sinus thrombosis.  The   superficial and deep venous systems are patent without evidence of   intraluminal thrombus.      < from: MR Thoracic/Lumbar Spine w/wo IV Cont (07.07.23 @ 13:09) >  LOWER THORACIC SPINE: No spinal canal or neuroforaminal stenosis.    L1-L2: No spinal canal or neuroforaminal stenosis.  L2-L3: No spinal canal or neuroforaminal stenosis.  L3-L4: No spinal canal or neuroforaminal stenosis.  L4-L5: No spinal canal or neuroforaminal stenosis.  L5-S1: No spinal canal or neuroforaminal stenosis.      IMPRESSION:  No interval change from prior thoracic and lumbar spine MRIs of 9/2/2022  No cord or cauda equina compression. No cord signal abnormality or   abnormal enhancement. No acute events overnight, no new complaints, still with LLE weakness, chronic LBP with subjective radiculopathy    MRI brain and c-spine pending    ROS: As stated above otherwise negative    MEDICATIONS  (STANDING):  baclofen 5 milliGRAM(s) Oral every 8 hours  bictegravir 50 mG/emtricitabine 200 mG/tenofovir alafenamide 25 mG (BIKTARVY) 1 Tablet(s) Oral daily  cyanocobalamin 1000 MICROGram(s) Oral daily  influenza   Vaccine 0.5 milliLiter(s) IntraMuscular once  LORazepam   Injectable 1 milliGRAM(s) IV Push once  pregabalin 150 milliGRAM(s) Oral two times a day  QUEtiapine 100 milliGRAM(s) Oral daily  QUEtiapine 300 milliGRAM(s) Oral at bedtime      Vital Signs Last 24 Hrs  T(C): 36.8 (02 Oct 2023 07:59), Max: 36.8 (01 Oct 2023 17:00)  T(F): 98.2 (02 Oct 2023 07:59), Max: 98.2 (01 Oct 2023 17:00)  HR: 100 (02 Oct 2023 07:59) (94 - 100)  BP: 116/56 (02 Oct 2023 07:59) (116/56 - 136/80)  BP(mean): --  RR: 18 (02 Oct 2023 07:59) (18 - 18)  SpO2: 97% (02 Oct 2023 07:59) (96% - 99%)      Neurological exam:  HF: Awake, alert, follows simple commands, participates in exam inconsistently, O x time, hospital, year, Speech fluent, No Aphasia or paraphasic errors. Naming /repetition intact   CN: JOCELYN, EOMI, VFF, facial sensation normal, no NLFD, tongue midline, Palate moves equally  Motor: No pronator drift, UE 5/5 LLE 4/5 limited due to LBP, RLE 4+/5, no tremor, rigidity   Sens: Intact to light touch / PP  Reflexes:  3+ UE/KJ, AJ absent b/l, Left plantar upgoing toes, no ankle clonus, hoffmans NEG  Coord:  No FNFA, dysmetria, AMAN intact   Gait/Balance: Cannot test                        13.0   4.01  )-----------( 263      ( 02 Oct 2023 08:47 )             39.6     10-02    139  |  110<H>  |  12  ----------------------------<  141<H>  3.7   |  22  |  0.96    Ca    9.1      02 Oct 2023 07:24        09-22 Chol 194 LDL -- HDL 42 Trig 90    Radiology report:  CT Angio Neck Stroke Protocol w/ IV Cont (09.21.23 @ 01:24)     CT ANGIOGRAPHY NECK:  1.  The cervical vasculature is patent without evidence of   atherosclerosis, stenosis or dissection.  The left vertebral artery   arises directly off the aorta.  The right vertebral artery is dominant.  2.  The adenoids are moderately to severely enlarged.  The palatine and   lingual tonsils are mildly enlarged.    CT ANGIOGRAPHY BRAIN: No vessel occlusion, flow-limiting stenosis or   aneurysm about the [place].  No evidence of an arteriovenous   malformation.    CT VENOGRAM BRAIN: No evidence of dural venous sinus thrombosis.  The   superficial and deep venous systems are patent without evidence of   intraluminal thrombus.      < from: MR Thoracic/Lumbar Spine w/wo IV Cont (07.07.23 @ 13:09) >  LOWER THORACIC SPINE: No spinal canal or neuroforaminal stenosis.    L1-L2: No spinal canal or neuroforaminal stenosis.  L2-L3: No spinal canal or neuroforaminal stenosis.  L3-L4: No spinal canal or neuroforaminal stenosis.  L4-L5: No spinal canal or neuroforaminal stenosis.  L5-S1: No spinal canal or neuroforaminal stenosis.      IMPRESSION:  No interval change from prior thoracic and lumbar spine MRIs of 9/2/2022  No cord or cauda equina compression. No cord signal abnormality or   abnormal enhancement.

## 2023-10-02 NOTE — OCCUPATIONAL THERAPY INITIAL EVALUATION ADULT - BALANCE TRAINING, PT EVAL
Pt will demonstrate improved static/dynamic standing balance by 1/2 grade in order to increase safety and independence in higher level ADLs within 1 week.

## 2023-10-02 NOTE — OCCUPATIONAL THERAPY INITIAL EVALUATION ADULT - MD ORDER
"OT Evaluate and Treat"- MD orders received. Chart reviewed, contents noted, conferred with RN "OT Evaluate and Treat"- MD orders received. Chart reviewed, contents noted, conferred with ANDREA Potter

## 2023-10-02 NOTE — CONSULT NOTE ADULT - SUBJECTIVE AND OBJECTIVE BOX
33yM was admitted on 09-30    Patient is a 33y old  Male who presents with a chief complaint of gait impairment (01 Oct 2023 15:36)    HPI:  HPI: The patient is a 32 yo male with Hx. of congenital HIV, chronic low back pain, Guillain [place] syndrome diagnosed in 2018 post flu vaccine, gait instability  who is c/o worsening leg weakness, head ache  for which he was hospitalized in [facility]  on 9/21/23, had neurology consult , rec. MRI  C spine, he was referred for BRAYDEN  but he left the Hospital AMA. He was on his way back to [facility]  but he tripped and fell in the train station and he was brought to [facility] ED. denies head injury, c/o L knee pain.    PMHx: HIV from birth, Chronic sinusitis,  GBS, Cocaine abuse,    PSHx: left arm surgery    Family Hx: not available does not know    Social Hx.: not smoking, no alcohol use    Imaging performed:  CT ANGIOGRAPHY NECK:9/21/2023  1. The cervical vasculature is patent without evidence of atherosclerosis, stenosis or dissection. The left vertebral artery arises directly off the aorta. The right vertebral artery is dominant.  2. The adenoids are moderately to severely enlarged. The palatine and lingual tonsils are mildly enlarged.    CT ANGIOGRAPHY BRAIN: 9/21/2023 No vessel occlusion, flow-limiting stenosis or aneurysm about the [place]. No evidence of an arteriovenous malformation.    CT VENOGRAM BRAIN :9/21/2023  No evidence of dural venous sinus thrombosis. The superficial and deep venous systems are patent without evidence of intraluminal thrombus.    REVIEW OF SYSTEMS  Constitutional - No fever, No weight loss, No fatigue  HEENT - No eye pain, No visual disturbances, No difficulty hearing, No tinnitus, No vertigo, No neck pain  Respiratory - No cough, No wheezing, No shortness of breath  Cardiovascular - No chest pain, No palpitations  Gastrointestinal - No abdominal pain, No nausea, No vomiting, No diarrhea, No constipation  Genitourinary - No dysuria, No frequency, No hematuria, No incontinence  Neurological - No headaches, No memory loss, No loss of strength, No numbness, No tremors  Skin - No itching, No rashes, No lesions   Endocrine - No temperature intolerance  Musculoskeletal - No joint pain, No joint swelling, No muscle pain  Psychiatric - No depression, No anxiety    VITALS  T(C): 36.8 (10-02-23 @ 07:59), Max: 36.8 (10-01-23 @ 17:00)  HR: 100 (10-02-23 @ 07:59) (94 - 100)  BP: 116/56 (10-02-23 @ 07:59) (116/56 - 136/80)  RR: 18 (10-02-23 @ 07:59) (18 - 18)  SpO2: 97% (10-02-23 @ 07:59) (96% - 99%)  Wt(kg): --    PAST MEDICAL & SURGICAL HISTORY  HIV (human immunodeficiency virus infection)    Guillain BarrÃ© syndrome    Guillain-Shelbiana    Asthma    HIV (human immunodeficiency virus infection)    CVA (cerebral vascular accident)    Closed fracture of multiple ribs of right side, initial encounter    Cocaine abuse    Chronic sinusitis    Homeless    HIV disease    HIV (human immunodeficiency virus infection)    GBS (Guillain [place] syndrome)    Guillain-Shelbiana syndrome    HIV positive    Stroke    Guillain-Shelbiana    GBS (Guillain [place] syndrome)    Prophylactic measure    HIV disease    History of orthopedic surgery    No significant past surgical history    No significant past surgical history        SOCIAL HISTORY - as per documentation/history  Smoking - None  EtOH - None  Drugs - None    FUNCTIONAL HISTORY  Lives   Independent    CURRENT FUNCTIONAL STATUS      FAMILY HISTORY   No pertinent family history in first degree relatives    Family history unknown    FH: HIV infection (Mother)        RECENT LABS - Reviewed  CBC Full  -  ( 02 Oct 2023 08:47 )  WBC Count : 4.01 K/uL  RBC Count : 4.43 M/uL  Hemoglobin : 13.0 g/dL  Hematocrit : 39.6 %  Platelet Count - Automated : 263 K/uL  Mean Cell Volume : 89.4 fl  Mean Cell Hemoglobin : 29.3 pg  Mean Cell Hemoglobin Concentration : 32.8 gm/dL  Auto Neutrophil # : x  Auto Lymphocyte # : x  Auto Monocyte # : x  Auto Eosinophil # : x  Auto Basophil # : x  Auto Neutrophil % : x  Auto Lymphocyte % : x  Auto Monocyte % : x  Auto Eosinophil % : x  Auto Basophil % : x    10-02    139  |  110<H>  |  12  ----------------------------<  141<H>  3.7   |  22  |  0.96    Ca    9.1      02 Oct 2023 07:24      Urinalysis Basic - ( 02 Oct 2023 07:24 )    Color: x / Appearance: x / SG: x / pH: x  Gluc: 141 mg/dL / Ketone: x  / Bili: x / Urobili: x   Blood: x / Protein: x / Nitrite: x   Leuk Esterase: x / RBC: x / WBC x   Sq Epi: x / Non Sq Epi: x / Bacteria: x        ALLERGIES  Ceclor (Unknown)  Toradol (Swelling)  Toradol (Anaphylaxis; Swelling)      MEDICATIONS   acetaminophen     Tablet .. 975 milliGRAM(s) Oral every 6 hours PRN  aluminum hydroxide/magnesium hydroxide/simethicone Suspension 30 milliLiter(s) Oral every 4 hours PRN  baclofen 5 milliGRAM(s) Oral every 8 hours  bictegravir 50 mG/emtricitabine 200 mG/tenofovir alafenamide 25 mG (BIKTARVY) 1 Tablet(s) Oral daily  cyanocobalamin 1000 MICROGram(s) Oral daily  influenza   Vaccine 0.5 milliLiter(s) IntraMuscular once  LORazepam   Injectable 1 milliGRAM(s) IV Push once  melatonin 3 milliGRAM(s) Oral at bedtime PRN  ondansetron Injectable 4 milliGRAM(s) IV Push every 8 hours PRN  oxyCODONE    IR 10 milliGRAM(s) Oral every 4 hours PRN  pregabalin 150 milliGRAM(s) Oral two times a day  QUEtiapine 100 milliGRAM(s) Oral daily  QUEtiapine 300 milliGRAM(s) Oral at bedtime      ----------------------------------------------------------------------------------------  PHYSICAL EXAM  Constitutional - NAD, Comfortable  HEENT - NCAT, EOMI  Neck - Supple, No limited ROM  Chest - Breathing comfortably, No wheezing  Cardiovascular - S1S2   Abdomen - Soft   Extremities - No C/C/E, No calf tenderness   Neurologic Exam -                    Cognitive - AAO to self, place, date, year, situation     Communication - Fluent, No dysarthria     Cranial Nerves - CN 2-12 intact     Motor - No focal deficits                    LEFT    UE - ShAB 5/5, EF 5/5, EE 5/5, WE 5/5,  5/5                    RIGHT UE - ShAB 5/5, EF 5/5, EE 5/5, WE 5/5,  5/5                    LEFT    LE - HF 5/5, KE 5/5, DF 5/5, PF 5/5                    RIGHT LE - HF 5/5, KE 5/5, DF 5/5, PF 5/5        Sensory - Intact to LT     Reflexes - DTR Intact, No primitive reflexive     Coordination - FTN intact     OculoVestibular - No saccades, No nystagmus, VOR         Balance - WNL Static  Psychiatric - Mood stable, Affect WNL  ----------------------------------------------------------------------------------------   33yM was admitted on 09-30    Patient is a 33y old  Male who presents with a chief complaint of gait impairment (01 Oct 2023 15:36)    HPI:  HPI: The patient is a 32 yo male with Hx. of congenital HIV, chronic low back pain, Guillain [place] syndrome diagnosed in 2018 post flu vaccine, gait instability  who is c/o worsening leg weakness, head ache  for which he was hospitalized in [facility]  on 9/21/23, had neurology consult , rec. MRI  C spine, he was referred for BRAYDEN  but he left the Hospital AMA. He was on his way back to [facility]  but he tripped and fell in the train station and he was brought to [facility] ED. denies head injury, c/o L knee pain.    PMHx: HIV from birth, Chronic sinusitis,  GBS, Cocaine abuse,    PSHx: left arm surgery    Family Hx: not available does not know    Social Hx.: not smoking, no alcohol use    Imaging performed:  CT ANGIOGRAPHY NECK:9/21/2023  1. The cervical vasculature is patent without evidence of atherosclerosis, stenosis or dissection. The left vertebral artery arises directly off the aorta. The right vertebral artery is dominant.  2. The adenoids are moderately to severely enlarged. The palatine and lingual tonsils are mildly enlarged.    CT ANGIOGRAPHY BRAIN: 9/21/2023 No vessel occlusion, flow-limiting stenosis or aneurysm about the [place]. No evidence of an arteriovenous malformation.    CT VENOGRAM BRAIN :9/21/2023  No evidence of dural venous sinus thrombosis. The superficial and deep venous systems are patent without evidence of intraluminal thrombus.    REVIEW OF SYSTEMS  Constitutional - No fever, No weight loss, No fatigue  HEENT - No eye pain, No visual disturbances, No difficulty hearing, No tinnitus, No vertigo, No neck pain  Respiratory - No cough, No wheezing, No shortness of breath  Cardiovascular - No chest pain, No palpitations  Gastrointestinal - No abdominal pain, No nausea, No vomiting, No diarrhea, No constipation  Genitourinary - No dysuria, No frequency, No hematuria, No incontinence  Neurological - No headaches, No memory loss, No loss of strength, No numbness, No tremors  Skin - No itching, No rashes, No lesions   Endocrine - No temperature intolerance  Musculoskeletal - No joint pain, No joint swelling, No muscle pain  Psychiatric - No depression, No anxiety    VITALS  T(C): 36.8 (10-02-23 @ 07:59), Max: 36.8 (10-01-23 @ 17:00)  HR: 100 (10-02-23 @ 07:59) (94 - 100)  BP: 116/56 (10-02-23 @ 07:59) (116/56 - 136/80)  RR: 18 (10-02-23 @ 07:59) (18 - 18)  SpO2: 97% (10-02-23 @ 07:59) (96% - 99%)  Wt(kg): --    PAST MEDICAL & SURGICAL HISTORY  HIV (human immunodeficiency virus infection)    Guillain BarrÃ© syndrome    Guillain-[place]    Asthma    HIV (human immunodeficiency virus infection)    CVA (cerebral vascular accident)    Closed fracture of multiple ribs of right side, initial encounter    Cocaine abuse    Chronic sinusitis    Homeless    HIV disease    HIV (human immunodeficiency virus infection)    GBS (Guillain [place] syndrome)    Guillain-Reno syndrome    HIV positive    Stroke    Guillain-Reno    GBS (Guillain [place] syndrome)    Prophylactic measure    HIV disease    History of orthopedic surgery    No significant past surgical history    No significant past surgical history        SOCIAL HISTORY - as per documentation/history  Smoking - None  EtOH - None  Drugs - None    FUNCTIONAL HISTORY  Lives alone in a home. Several steps to enter. He used a cane/ walker due to residual neuropathy from  GBS    CURRENT FUNCTIONAL STATUS  Supine to sit CG  Sit to stand with minimum assist    FAMILY HISTORY   No pertinent family history in first degree relatives    Family history unknown    FH: HIV infection (Mother)    RECENT LABS - Reviewed  CBC Full  -  ( 02 Oct 2023 08:47 )  WBC Count : 4.01 K/uL  RBC Count : 4.43 M/uL  Hemoglobin : 13.0 g/dL  Hematocrit : 39.6 %  Platelet Count - Automated : 263 K/uL  Mean Cell Volume : 89.4 fl  Mean Cell Hemoglobin : 29.3 pg  Mean Cell Hemoglobin Concentration : 32.8 gm/dL  Auto Neutrophil # : x  Auto Lymphocyte # : x  Auto Monocyte # : x  Auto Eosinophil # : x  Auto Basophil # : x  Auto Neutrophil % : x  Auto Lymphocyte % : x  Auto Monocyte % : x  Auto Eosinophil % : x  Auto Basophil % : x    10-02    139  |  110<H>  |  12  ----------------------------<  141<H>  3.7   |  22  |  0.96    Ca    9.1      02 Oct 2023 07:24      Urinalysis Basic - ( 02 Oct 2023 07:24 )    Color: x / Appearance: x / SG: x / pH: x  Gluc: 141 mg/dL / Ketone: x  / Bili: x / Urobili: x   Blood: x / Protein: x / Nitrite: x   Leuk Esterase: x / RBC: x / WBC x   Sq Epi: x / Non Sq Epi: x / Bacteria: x        ALLERGIES  Ceclor (Unknown)  Toradol (Swelling)  Toradol (Anaphylaxis; Swelling)      MEDICATIONS   acetaminophen     Tablet .. 975 milliGRAM(s) Oral every 6 hours PRN  aluminum hydroxide/magnesium hydroxide/simethicone Suspension 30 milliLiter(s) Oral every 4 hours PRN  baclofen 5 milliGRAM(s) Oral every 8 hours  bictegravir 50 mG/emtricitabine 200 mG/tenofovir alafenamide 25 mG (BIKTARVY) 1 Tablet(s) Oral daily  cyanocobalamin 1000 MICROGram(s) Oral daily  influenza   Vaccine 0.5 milliLiter(s) IntraMuscular once  LORazepam   Injectable 1 milliGRAM(s) IV Push once  melatonin 3 milliGRAM(s) Oral at bedtime PRN  ondansetron Injectable 4 milliGRAM(s) IV Push every 8 hours PRN  oxyCODONE    IR 10 milliGRAM(s) Oral every 4 hours PRN  pregabalin 150 milliGRAM(s) Oral two times a day  QUEtiapine 100 milliGRAM(s) Oral daily  QUEtiapine 300 milliGRAM(s) Oral at bedtime      ----------------------------------------------------------------------------------------  PHYSICAL EXAM  Constitutional - NAD, Comfortable  HEENT - NCAT, EOMI  Neck - Supple, No limited ROM  Chest - Breathing comfortably, No wheezing  Cardiovascular - S1S2   Abdomen - Soft   Extremities - No C/C/E, No calf tenderness   LLE Hip -20-70, Knee -30-90 ankle 0-10  RLE Hip -10-70, Knee -20-90 ankle 0-10    Neurologic Exam -                    Cognitive - AAO to self, place, date, year, situation     Communication - Fluent, No dysarthria     Cranial Nerves - CN 2-12 intact     Motor - No focal deficits                    LEFT    UE - ShAB 4/5, EF 4/5, EE 4/5, WE 4/5,  4/5                    RIGHT UE - ShAB 4/5, EF 4/5, EE 4/5, WE 4/5,  4/5                    LEFT    LE - HF 3-/5, KE 3-/5, DF 3-/5, PF 3-/5                    RIGHT LE - HF 3/5, KE 3/5, DF 3/5, PF 3/5        Sensory - Intact to LT     Reflexes - DTR Intact, No primitive reflexive     Coordination - FTN intact     OculoVestibular - No saccades, No nystagmus, VOR         Balance - good Static  Psychiatric - Mood stable, Affect WNL  ----------------------------------------------------------------------------------------

## 2023-10-02 NOTE — OCCUPATIONAL THERAPY INITIAL EVALUATION ADULT - ADL RETRAINING, OT EVAL
Pt will perform LE dressing and footwear management with CG assistance in 3-5 tx sessions. Pt will perform toileting with SBA in 3-5 tx sessions.

## 2023-10-02 NOTE — CONSULT NOTE ADULT - ASSESSMENT
ASSESSMENT/PLAN  33yMale with functional deficits after  Pain - Tylenol  DVT PPX - SCDs  Rehab -    Recommend ACUTE inpatient rehabilitation for the functional deficits consisting of 3 hours of therapy/day & 24 hour RN/daily PMR physician for comorbid medical management. Patient will be able to tolerate 3 hours a day.   Recommend BRAYDEN, patient DOES NOT meet acute inpatient rehabilitation criteria. Patient needs a more prolonged stay to achieve transition to community.    Expect patient to achieve functional goals for DC HOME with OUTPATIENT   Expect patient to achieve functional goals for DC HOME with HOME CARE   Follow up with CONCUSSION PROGRAM - Call [phone] for an appointment    Will continue to follow. Functional progress will determine ongoing rehab dispo recommendations, which may change.    Continue bedside therapy as well as OOB throughout the day with mobilization by staff to maintain cardiopulmonary function and prevention of secondary complications related to debility.     Discussed with rehab team.    ASSESSMENT/PLAN  32 yo male with Hx. of congenital HIV, chronic low back pain, Guillain [place] syndrome diagnosed in 2018 post flu vaccine, gait instability   Pain - Tylenol  DVT PPX - SCDs  Rehab -     Recommend BRAYDEN, patient DOES NOT meet acute inpatient rehabilitation criteria. Patient needs a more prolonged stay to achieve transition to community.      Will continue to follow. Functional progress will determine ongoing rehab dispo recommendations, which may change.    Continue bedside therapy as well as OOB throughout the day with mobilization by staff to maintain cardiopulmonary function and prevention of secondary complications related to debility.

## 2023-10-02 NOTE — PHYSICAL THERAPY INITIAL EVALUATION ADULT - PLANNED THERAPY INTERVENTIONS, PT EVAL
Eval, amb, transfer, bed mobility x 23'. Pt left in bed with all observation section equipment/material intact and bed alarm activated. Pt c/o pain low back radiating down bilateral LE's 10/10, RN informed. Will cont to follow pt and increase as tolerated./bed mobility training/gait training/ROM/strengthening/transfer training

## 2023-10-02 NOTE — OCCUPATIONAL THERAPY INITIAL EVALUATION ADULT - ADDITIONAL COMMENTS
Pt reports he currently resides in a PH alone with no STACEY or steps inside. Pt reports he has a walk in shower and standard toilet with no DME in bathroom. Pt reports being (I) with all ADL/IADL tasks PTA, walked without AD (pt reports they wanted him to use RW vs SAC but he did not). +, +glasses, RHD

## 2023-10-02 NOTE — PROGRESS NOTE ADULT - ASSESSMENT
34yo M PMHx con HIV on biktarvy, HIV myelopathy, chronic back pain, substance use, GBS? s/p influenza vaccine presents to ED with c/o worsening leg weakness and head ache. Pt was recently hospitalized in [facility]  9/21/23, seen by neuro, CVA ruled out, MRI brain - C spine ordered - were not done, as he left the Hospital AMA, reports he had to attend to his daughter. He was on his way back to [facility] he tripped and fell in the train station, was brought to ED.     Patient has chronic neck mid and low back pains, intermittent stiffness in the neck, had difficulty with his balance, is able to ambulate independently, balance and gait have worsened recently. He had had extensive w/u and admissions at multiple [facility] for fluctuating/worsening lower extremity weakness, urinary incontinence, MRI thoracic and lumbar spine in 7/2023, ruled out cauda equina/thoracic or lumbar cord lesions      # Severe low back pain, giveaway weakness left leg due to pain    # Gait imbalance post GBS    # Chronic neck pain    – Will obtain MRI cervical spine with [place] and MRI brain with and without contrast, rule out any mass lesions or new enhancing lesions  – PT/OT  – outpatient Pain management with Dr. Schilling discussed      D/W pt, Dr. Cedeno, and Dr. Bocanegra 32yo M PMHx con HIV on biktarvy, HIV myelopathy, chronic back pain, substance use, GBS? s/p influenza vaccine presents to ED with c/o worsening leg weakness and head ache. Pt was recently hospitalized in [facility]  9/21/23, seen by neuro, CVA ruled out, MRI brain - C spine ordered - were not done, as he left the Hospital AMA, reports he had to attend to his daughter. He was on his way back to [facility] he tripped and fell in the train station, was brought to ED.     Patient has chronic neck mid and low back pains, intermittent stiffness in the neck, had difficulty with his balance, is able to ambulate independently, balance and gait have worsened recently. He had had extensive w/u and admissions at multiple [facility] for fluctuating/worsening lower extremity weakness, urinary incontinence, MRI thoracic and lumbar spine in 7/2023, ruled out cauda equina/thoracic or lumbar cord lesions      # Severe low back pain, giveaway weakness left leg due to pain    # Gait imbalance post GBS (currently no s/s suggestive of GBS)    # Chronic neck pain    – Will obtain MRI cervical spine with [place] and MRI brain with and without contrast, rule out any mass lesions or new enhancing lesions  – PT/OT  – outpatient Pain management with Dr. Pena,  discussed with pt       D/W pt, Dr. Cedeno, and Dr. Bocaengra

## 2023-10-02 NOTE — PROGRESS NOTE ADULT - ASSESSMENT
34 yo male with Hx. of congenital HIV, chronic low back pain, Guillain [place] syndrome diagnosed in 2018 post flu vaccine, gait instability  who is c/o worsening leg weakness, head ache  for which he was hospitalized in [facility]  on 9/21/23, had neurology consult , rec. MRI  C spine, he was referred for BRAYDEN  but he left the Hospital AMA. He was on his way back to [facility]  but he tripped and fell in the train station and he was brought to [facility]. ED. denies head injury, c/o L knee pain.      Difficulty ambulation with fall  Hx Guillan [place], currently no s/s of Guillan [place] Syndrome  MRI thoracic and lumbar spine in 7/2023, ruled out cauda equina/thoracic or lumbar cord lesions  Denies Head injury  admit to  medicine  Repeat MRI Brain and Cervical- pending  Neurology consulted, recommendations appreciated  PT consulted,  Physiatry consulted - Plan fo BRAYDEN        Code status Full Code  DVT ppx - Heparin

## 2023-10-02 NOTE — PROGRESS NOTE ADULT - SUBJECTIVE AND OBJECTIVE BOX
HOSPITALIST ATTENDING PROGRESS NOTE    Chart and meds reviewed.  Patient seen and examined.    Subjective: patient seen and examined. resting comfortably in bed. MRI Cervical and brain pending. Patient worked with PT today. Physiatry consulted recommendations appreciated. BRAYDEN recommended. No acute events overnight     All other systems reviewed and found to be negative with the exception of what has been described above.    MEDICATIONS  (STANDING):  baclofen 5 milliGRAM(s) Oral every 8 hours  bictegravir 50 mG/emtricitabine 200 mG/tenofovir alafenamide 25 mG (BIKTARVY) 1 Tablet(s) Oral daily  cyanocobalamin 1000 MICROGram(s) Oral daily  influenza   Vaccine 0.5 milliLiter(s) IntraMuscular once  LORazepam   Injectable 1 milliGRAM(s) IV Push once  pregabalin 150 milliGRAM(s) Oral two times a day  QUEtiapine 100 milliGRAM(s) Oral daily  QUEtiapine 300 milliGRAM(s) Oral at bedtime    MEDICATIONS  (PRN):  acetaminophen     Tablet .. 975 milliGRAM(s) Oral every 6 hours PRN Mild Pain (1 - 3)  aluminum hydroxide/magnesium hydroxide/simethicone Suspension 30 milliLiter(s) Oral every 4 hours PRN Dyspepsia  melatonin 3 milliGRAM(s) Oral at bedtime PRN Insomnia  ondansetron Injectable 4 milliGRAM(s) IV Push every 8 hours PRN Nausea and/or Vomiting  oxyCODONE    IR 10 milliGRAM(s) Oral every 4 hours PRN Severe Pain (7 - 10)      VITALS:  T(F): 98.2 (10-02-23 @ 07:59), Max: 98.2 (10-01-23 @ 17:00)  HR: 100 (10-02-23 @ 07:59) (94 - 100)  BP: 116/56 (10-02-23 @ 07:59) (116/56 - 136/80)  RR: 18 (10-02-23 @ 07:59) (18 - 18)  SpO2: 97% (10-02-23 @ 07:59) (96% - 99%)  Wt(kg): --    I&O's Summary      CAPILLARY BLOOD GLUCOSE          PHYSICAL EXAM:  Gen: AAOX3 No acute distresss  HEENT: PERRLA EOMI  MOUTH/TEETH/GUMS: Clear  NECK: no JVD  LUNGS: CTA bilaterally no wheezing appreciated.   HEART: S1,S2 RR  ABDOMEN: soft  non distended nontender  EXTREMITIES:  no pedal edema  MUSCULOSKELETAL: no joint swelling   NEURO: no tremor, no focal signs. bilat lower extremities weakness  SKIN: no rash  : CVA negative,    LABS:                            13.0   4.01  )-----------( 263      ( 02 Oct 2023 08:47 )             39.6     10-02    139  |  110<H>  |  12  ----------------------------<  141<H>  3.7   |  22  |  0.96    Ca    9.1      02 Oct 2023 07:24      CARDIAC MARKERS ( 02 Oct 2023 07:24 )  x     / x     / 117 U/L / x     / x              Urinalysis Basic - ( 02 Oct 2023 07:24 )    Color: x / Appearance: x / SG: x / pH: x  Gluc: 141 mg/dL / Ketone: x  / Bili: x / Urobili: x   Blood: x / Protein: x / Nitrite: x   Leuk Esterase: x / RBC: x / WBC x   Sq Epi: x / Non Sq Epi: x / Bacteria: x              CULTURES:      Additional results/Imaging, I have personally reviewed:    Telemetry, personally reviewed:

## 2023-10-02 NOTE — OCCUPATIONAL THERAPY INITIAL EVALUATION ADULT - GENERAL OBSERVATIONS, REHAB EVAL
Pt rec'd/left semi-supine in bed, lines intact, CB/TT/phone IR, needs met, left in NAD, agreeable to OT IE.

## 2023-10-02 NOTE — PHYSICAL THERAPY INITIAL EVALUATION ADULT - GENERAL OBSERVATIONS, REHAB EVAL
Pt found supine in bed with bed alarm activated. Pt c/o pain low back radiating down bilateral LE's 7/10. RN notified.

## 2023-10-02 NOTE — PHYSICAL THERAPY INITIAL EVALUATION ADULT - ACTIVE RANGE OF MOTION EXAMINATION, REHAB EVAL
Bilateral LE AROM/AAROM hip WNL, Right knee ext/flex ~15-~WFL, right DF neutral. Left knee ext/flex ~20-~WFL, and DF ~-10 degrees./Left UE Active ROM was WFL (within functional limits)/Right UE Active ROM was WFL (within functional limits)

## 2023-10-02 NOTE — OCCUPATIONAL THERAPY INITIAL EVALUATION ADULT - VISUAL ASSESSMENT: TRACKING
saccades, smooth pursuits and visual field all normal, reports no diplopia, does report blurry vision when reading items far away- recommend following up with an ophthalmologist

## 2023-10-03 ENCOUNTER — EMERGENCY (EMERGENCY)
Facility: HOSPITAL | Age: 33
LOS: 0 days | Discharge: HOME | End: 2023-10-04
Attending: EMERGENCY MEDICINE
Payer: MEDICAID

## 2023-10-03 VITALS
OXYGEN SATURATION: 97 % | SYSTOLIC BLOOD PRESSURE: 138 MMHG | HEART RATE: 86 BPM | TEMPERATURE: 99 F | RESPIRATION RATE: 18 BRPM | DIASTOLIC BLOOD PRESSURE: 77 MMHG

## 2023-10-03 VITALS — WEIGHT: 179.9 LBS | HEIGHT: 72 IN

## 2023-10-03 DIAGNOSIS — K62.89 OTHER SPECIFIED DISEASES OF ANUS AND RECTUM: ICD-10-CM

## 2023-10-03 DIAGNOSIS — Z86.59 PERSONAL HISTORY OF OTHER MENTAL AND BEHAVIORAL DISORDERS: ICD-10-CM

## 2023-10-03 DIAGNOSIS — Z98.890 OTHER SPECIFIED POSTPROCEDURAL STATES: Chronic | ICD-10-CM

## 2023-10-03 DIAGNOSIS — J45.909 UNSPECIFIED ASTHMA, UNCOMPLICATED: ICD-10-CM

## 2023-10-03 DIAGNOSIS — Z88.6 ALLERGY STATUS TO ANALGESIC AGENT: ICD-10-CM

## 2023-10-03 DIAGNOSIS — K62.5 HEMORRHAGE OF ANUS AND RECTUM: ICD-10-CM

## 2023-10-03 DIAGNOSIS — Z88.1 ALLERGY STATUS TO OTHER ANTIBIOTIC AGENTS STATUS: ICD-10-CM

## 2023-10-03 DIAGNOSIS — Z59.00 HOMELESSNESS UNSPECIFIED: ICD-10-CM

## 2023-10-03 DIAGNOSIS — G61.0 GUILLAIN-BARRE SYNDROME: ICD-10-CM

## 2023-10-03 DIAGNOSIS — B20 HUMAN IMMUNODEFICIENCY VIRUS [HIV] DISEASE: ICD-10-CM

## 2023-10-03 LAB
ALBUMIN SERPL ELPH-MCNC: 3.3 G/DL — SIGNIFICANT CHANGE UP (ref 3.3–5)
ALP SERPL-CCNC: 61 U/L — SIGNIFICANT CHANGE UP (ref 40–120)
ALT FLD-CCNC: 19 U/L — SIGNIFICANT CHANGE UP (ref 12–78)
ANION GAP SERPL CALC-SCNC: 5 MMOL/L — SIGNIFICANT CHANGE UP (ref 5–17)
AST SERPL-CCNC: 14 U/L — LOW (ref 15–37)
BILIRUB SERPL-MCNC: 0.5 MG/DL — SIGNIFICANT CHANGE UP (ref 0.2–1.2)
BUN SERPL-MCNC: 10 MG/DL — SIGNIFICANT CHANGE UP (ref 7–23)
CALCIUM SERPL-MCNC: 8.9 MG/DL — SIGNIFICANT CHANGE UP (ref 8.5–10.1)
CHLORIDE SERPL-SCNC: 107 MMOL/L — SIGNIFICANT CHANGE UP (ref 96–108)
CO2 SERPL-SCNC: 26 MMOL/L — SIGNIFICANT CHANGE UP (ref 22–31)
CREAT SERPL-MCNC: 0.93 MG/DL — SIGNIFICANT CHANGE UP (ref 0.5–1.3)
EGFR: 110 ML/MIN/1.73M2 — SIGNIFICANT CHANGE UP
GLUCOSE SERPL-MCNC: 97 MG/DL — SIGNIFICANT CHANGE UP (ref 70–99)
HCT VFR BLD CALC: 39.6 % — SIGNIFICANT CHANGE UP (ref 39–50)
HGB BLD-MCNC: 13 G/DL — SIGNIFICANT CHANGE UP (ref 13–17)
MCHC RBC-ENTMCNC: 29.4 PG — SIGNIFICANT CHANGE UP (ref 27–34)
MCHC RBC-ENTMCNC: 32.8 GM/DL — SIGNIFICANT CHANGE UP (ref 32–36)
MCV RBC AUTO: 89.6 FL — SIGNIFICANT CHANGE UP (ref 80–100)
PLATELET # BLD AUTO: 259 K/UL — SIGNIFICANT CHANGE UP (ref 150–400)
POTASSIUM SERPL-MCNC: 3.9 MMOL/L — SIGNIFICANT CHANGE UP (ref 3.5–5.3)
POTASSIUM SERPL-SCNC: 3.9 MMOL/L — SIGNIFICANT CHANGE UP (ref 3.5–5.3)
PROT SERPL-MCNC: 7.4 GM/DL — SIGNIFICANT CHANGE UP (ref 6–8.3)
RBC # BLD: 4.42 M/UL — SIGNIFICANT CHANGE UP (ref 4.2–5.8)
RBC # FLD: 12.4 % — SIGNIFICANT CHANGE UP (ref 10.3–14.5)
SODIUM SERPL-SCNC: 138 MMOL/L — SIGNIFICANT CHANGE UP (ref 135–145)
WBC # BLD: 3.9 K/UL — SIGNIFICANT CHANGE UP (ref 3.8–10.5)
WBC # FLD AUTO: 3.9 K/UL — SIGNIFICANT CHANGE UP (ref 3.8–10.5)

## 2023-10-03 PROCEDURE — 83690 ASSAY OF LIPASE: CPT

## 2023-10-03 PROCEDURE — 87491 CHLMYD TRACH DNA AMP PROBE: CPT

## 2023-10-03 PROCEDURE — 85025 COMPLETE CBC W/AUTO DIFF WBC: CPT

## 2023-10-03 PROCEDURE — 87086 URINE CULTURE/COLONY COUNT: CPT

## 2023-10-03 PROCEDURE — 99232 SBSQ HOSP IP/OBS MODERATE 35: CPT

## 2023-10-03 PROCEDURE — 80053 COMPREHEN METABOLIC PANEL: CPT

## 2023-10-03 PROCEDURE — 87591 N.GONORRHOEAE DNA AMP PROB: CPT

## 2023-10-03 PROCEDURE — 99053 MED SERV 10PM-8AM 24 HR FAC: CPT

## 2023-10-03 PROCEDURE — 84484 ASSAY OF TROPONIN QUANT: CPT

## 2023-10-03 PROCEDURE — 99284 EMERGENCY DEPT VISIT MOD MDM: CPT | Mod: 25

## 2023-10-03 PROCEDURE — 81003 URINALYSIS AUTO W/O SCOPE: CPT

## 2023-10-03 PROCEDURE — 36415 COLL VENOUS BLD VENIPUNCTURE: CPT

## 2023-10-03 PROCEDURE — 99285 EMERGENCY DEPT VISIT HI MDM: CPT

## 2023-10-03 PROCEDURE — 83605 ASSAY OF LACTIC ACID: CPT

## 2023-10-03 PROCEDURE — 74177 CT ABD & PELVIS W/CONTRAST: CPT | Mod: MD

## 2023-10-03 RX ORDER — DIAZEPAM 5 MG
5 TABLET ORAL ONCE
Refills: 0 | Status: DISCONTINUED | OUTPATIENT
Start: 2023-10-03 | End: 2023-10-03

## 2023-10-03 RX ADMIN — OXYCODONE HYDROCHLORIDE 10 MILLIGRAM(S): 5 TABLET ORAL at 06:09

## 2023-10-03 RX ADMIN — BICTEGRAVIR SODIUM, EMTRICITABINE, AND TENOFOVIR ALAFENAMIDE FUMARATE 1 TABLET(S): 30; 120; 15 TABLET ORAL at 09:54

## 2023-10-03 RX ADMIN — OXYCODONE HYDROCHLORIDE 10 MILLIGRAM(S): 5 TABLET ORAL at 06:39

## 2023-10-03 RX ADMIN — PREGABALIN 1000 MICROGRAM(S): 225 CAPSULE ORAL at 09:54

## 2023-10-03 RX ADMIN — Medication 150 MILLIGRAM(S): at 09:53

## 2023-10-03 RX ADMIN — Medication 5 MILLIGRAM(S): at 15:23

## 2023-10-03 RX ADMIN — QUETIAPINE FUMARATE 100 MILLIGRAM(S): 200 TABLET, FILM COATED ORAL at 09:54

## 2023-10-03 RX ADMIN — Medication 5 MILLIGRAM(S): at 06:09

## 2023-10-03 RX ADMIN — OXYCODONE HYDROCHLORIDE 10 MILLIGRAM(S): 5 TABLET ORAL at 11:51

## 2023-10-03 SDOH — ECONOMIC STABILITY - HOUSING INSECURITY: HOMELESSNESS UNSPECIFIED: Z59.00

## 2023-10-03 NOTE — CHART NOTE - NSCHARTNOTEFT_GEN_A_CORE
Was called by RN due to patient wanting to sign out AMA. Asked patient why she wanted to leave, reports that " I have important things to take care of a home."    Patient is AAO x 3. I explained at length to the patient the risks of signing out AMA including but not limited to harm, injury or death. I explained the risks, benefits and alternatives to treatment as well as the attendant risks of refusing treatment at this time. I offered to answer any questions and fully answered any such questions. We believe that the patient fully understands what has been explained and answered. I informed hospitalist Dr. Greenwood of this, aware. Patient signed form to sign out AMA and accepts responsibility for any and all results of this decision.

## 2023-10-03 NOTE — PROGRESS NOTE ADULT - ASSESSMENT
34 yo male with Hx. 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 MRI  C spine, he was referred for B[facility]  but he left the Hospital AMA. He was on his way back to [facility]  but he tripped and fell in the train station and he was brought to [facility]. ED. denies head injury, c/o L knee pain.      Difficulty ambulation with fall  Hx Guillan [place], currently no s/s of Guillan Tacoma Syndrome  MRI thoracic and lumbar spine in 7/2023, ruled out cauda equina/thoracic or lumbar cord lesions  Denies Head injury  admit to  medicine  Repeat MRI Brain and Cervical- pending  Neurology consulted, recommendations appreciated  PT consulted,  Physiatry consulted - Plan fo BRAYDEN        Code status Full Code  DVT ppx - Heparin

## 2023-10-03 NOTE — PROGRESS NOTE ADULT - SUBJECTIVE AND OBJECTIVE BOX
HOSPITALIST ATTENDING PROGRESS NOTE    Chart and meds reviewed.  Patient seen and examined.    Subjective: Patient seen and examined. Resting comfortably, continuing to work with PT. Patient reported that he had to much anxiety for MRI yesterday, however is willing to undertake it today with valium. Follow up MRI, continue to work with PT.     All other systems reviewed and found to be negative with the exception of what has been described above.    MEDICATIONS  (STANDING):  baclofen 5 milliGRAM(s) Oral every 8 hours  bictegravir 50 mG/emtricitabine 200 mG/tenofovir alafenamide 25 mG (BIKTARVY) 1 Tablet(s) Oral daily  cyanocobalamin 1000 MICROGram(s) Oral daily  diazepam    Tablet 5 milliGRAM(s) Oral once  influenza   Vaccine 0.5 milliLiter(s) IntraMuscular once  pregabalin 150 milliGRAM(s) Oral two times a day  QUEtiapine 100 milliGRAM(s) Oral daily  QUEtiapine 300 milliGRAM(s) Oral at bedtime    MEDICATIONS  (PRN):  acetaminophen     Tablet .. 975 milliGRAM(s) Oral every 6 hours PRN Mild Pain (1 - 3)  aluminum hydroxide/magnesium hydroxide/simethicone Suspension 30 milliLiter(s) Oral every 4 hours PRN Dyspepsia  melatonin 3 milliGRAM(s) Oral at bedtime PRN Insomnia  ondansetron Injectable 4 milliGRAM(s) IV Push every 8 hours PRN Nausea and/or Vomiting  oxyCODONE    IR 10 milliGRAM(s) Oral every 4 hours PRN Severe Pain (7 - 10)      VITALS:  T(F): 98.2 (10-03-23 @ 08:03), Max: 98.6 (10-02-23 @ 15:29)  HR: 82 (10-03-23 @ 08:03) (69 - 95)  BP: 121/67 (10-03-23 @ 08:03) (117/56 - 121/67)  RR: 18 (10-03-23 @ 08:03) (18 - 19)  SpO2: 99% (10-03-23 @ 08:03) (98% - 99%)  Wt(kg): --    I&O's Summary      CAPILLARY BLOOD GLUCOSE          PHYSICAL EXAM:  Gen: AAOX3 No acute distresss  HEENT: PERRLA EOMI  MOUTH/TEETH/GUMS: Clear  NECK: no JVD  LUNGS: CTA bilaterally no wheezing appreciated.   HEART: S1,S2 RR  ABDOMEN: soft  non distended nontender  EXTREMITIES:  no pedal edema  MUSCULOSKELETAL: no joint swelling   NEURO: no tremor, no focal signs. bilat lower extremities weakness  SKIN: no rash  : CVA negative,    LABS:                            13.0   3.90  )-----------( 259      ( 03 Oct 2023 07:36 )             39.6     10-03    138  |  107  |  10  ----------------------------<  97  3.9   |  26  |  0.93    Ca    8.9      03 Oct 2023 07:36    TPro  7.4  /  Alb  3.3  /  TBili  0.5  /  DBili  x   /  AST  14<L>  /  ALT  19  /  AlkPhos  61  10-03    CARDIAC MARKERS ( 02 Oct 2023 07:24 )  x     / x     / 117 U/L / x     / x          LIVER FUNCTIONS - ( 03 Oct 2023 07:36 )  Alb: 3.3 g/dL / Pro: 7.4 gm/dL / ALK PHOS: 61 U/L / ALT: 19 U/L / AST: 14 U/L / GGT: x             Urinalysis Basic - ( 03 Oct 2023 07:36 )    Color: x / Appearance: x / SG: x / pH: x  Gluc: 97 mg/dL / Ketone: x  / Bili: x / Urobili: x   Blood: x / Protein: x / Nitrite: x   Leuk Esterase: x / RBC: x / WBC x   Sq Epi: x / Non Sq Epi: x / Bacteria: x              CULTURES:      Additional results/Imaging, I have personally reviewed:    Telemetry, personally reviewed:

## 2023-10-03 NOTE — ED ADULT TRIAGE NOTE - CHIEF COMPLAINT QUOTE
Ambulatory to ER with c/o rectal bleed x 1hr; patient denies abdominal pain. Patient left AMA from  at 2000. No medication taken prior to arrival.

## 2023-10-04 ENCOUNTER — TRANSCRIPTION ENCOUNTER (OUTPATIENT)
Age: 33
End: 2023-10-04

## 2023-10-04 VITALS
SYSTOLIC BLOOD PRESSURE: 124 MMHG | HEART RATE: 76 BPM | TEMPERATURE: 98 F | OXYGEN SATURATION: 100 % | DIASTOLIC BLOOD PRESSURE: 53 MMHG | RESPIRATION RATE: 18 BRPM

## 2023-10-04 LAB
ALBUMIN SERPL ELPH-MCNC: 3.5 G/DL — SIGNIFICANT CHANGE UP (ref 3.3–5)
ALP SERPL-CCNC: 65 U/L — SIGNIFICANT CHANGE UP (ref 40–120)
ALT FLD-CCNC: 24 U/L — SIGNIFICANT CHANGE UP (ref 12–78)
ANION GAP SERPL CALC-SCNC: 4 MMOL/L — LOW (ref 5–17)
APPEARANCE UR: CLEAR — SIGNIFICANT CHANGE UP
AST SERPL-CCNC: 17 U/L — SIGNIFICANT CHANGE UP (ref 15–37)
BASOPHILS # BLD AUTO: 0.03 K/UL — SIGNIFICANT CHANGE UP (ref 0–0.2)
BASOPHILS NFR BLD AUTO: 0.7 % — SIGNIFICANT CHANGE UP (ref 0–2)
BILIRUB SERPL-MCNC: 0.4 MG/DL — SIGNIFICANT CHANGE UP (ref 0.2–1.2)
BILIRUB UR-MCNC: NEGATIVE — SIGNIFICANT CHANGE UP
BUN SERPL-MCNC: 12 MG/DL — SIGNIFICANT CHANGE UP (ref 7–23)
CALCIUM SERPL-MCNC: 9.3 MG/DL — SIGNIFICANT CHANGE UP (ref 8.5–10.1)
CHLORIDE SERPL-SCNC: 106 MMOL/L — SIGNIFICANT CHANGE UP (ref 96–108)
CO2 SERPL-SCNC: 28 MMOL/L — SIGNIFICANT CHANGE UP (ref 22–31)
COLOR SPEC: YELLOW — SIGNIFICANT CHANGE UP
CREAT SERPL-MCNC: 1.02 MG/DL — SIGNIFICANT CHANGE UP (ref 0.5–1.3)
DIFF PNL FLD: NEGATIVE — SIGNIFICANT CHANGE UP
EGFR: 99 ML/MIN/1.73M2 — SIGNIFICANT CHANGE UP
EOSINOPHIL # BLD AUTO: 0.12 K/UL — SIGNIFICANT CHANGE UP (ref 0–0.5)
EOSINOPHIL NFR BLD AUTO: 2.8 % — SIGNIFICANT CHANGE UP (ref 0–6)
GLUCOSE SERPL-MCNC: 118 MG/DL — HIGH (ref 70–99)
GLUCOSE UR QL: NEGATIVE MG/DL — SIGNIFICANT CHANGE UP
HCT VFR BLD CALC: 40.4 % — SIGNIFICANT CHANGE UP (ref 39–50)
HGB BLD-MCNC: 13.6 G/DL — SIGNIFICANT CHANGE UP (ref 13–17)
IMM GRANULOCYTES NFR BLD AUTO: 0.5 % — SIGNIFICANT CHANGE UP (ref 0–0.9)
KETONES UR-MCNC: NEGATIVE MG/DL — SIGNIFICANT CHANGE UP
LACTATE SERPL-SCNC: 1.5 MMOL/L — SIGNIFICANT CHANGE UP (ref 0.7–2)
LEUKOCYTE ESTERASE UR-ACNC: NEGATIVE — SIGNIFICANT CHANGE UP
LIDOCAIN IGE QN: 20 U/L — SIGNIFICANT CHANGE UP (ref 13–75)
LYMPHOCYTES # BLD AUTO: 2.35 K/UL — SIGNIFICANT CHANGE UP (ref 1–3.3)
LYMPHOCYTES # BLD AUTO: 55.7 % — HIGH (ref 13–44)
MCHC RBC-ENTMCNC: 30 PG — SIGNIFICANT CHANGE UP (ref 27–34)
MCHC RBC-ENTMCNC: 33.7 GM/DL — SIGNIFICANT CHANGE UP (ref 32–36)
MCV RBC AUTO: 89.2 FL — SIGNIFICANT CHANGE UP (ref 80–100)
MONOCYTES # BLD AUTO: 0.43 K/UL — SIGNIFICANT CHANGE UP (ref 0–0.9)
MONOCYTES NFR BLD AUTO: 10.2 % — SIGNIFICANT CHANGE UP (ref 2–14)
NEUTROPHILS # BLD AUTO: 1.27 K/UL — LOW (ref 1.8–7.4)
NEUTROPHILS NFR BLD AUTO: 30.1 % — LOW (ref 43–77)
NITRITE UR-MCNC: NEGATIVE — SIGNIFICANT CHANGE UP
PH UR: 6.5 — SIGNIFICANT CHANGE UP (ref 5–8)
PLATELET # BLD AUTO: 274 K/UL — SIGNIFICANT CHANGE UP (ref 150–400)
POTASSIUM SERPL-MCNC: 3.8 MMOL/L — SIGNIFICANT CHANGE UP (ref 3.5–5.3)
POTASSIUM SERPL-SCNC: 3.8 MMOL/L — SIGNIFICANT CHANGE UP (ref 3.5–5.3)
PROT SERPL-MCNC: 7.7 GM/DL — SIGNIFICANT CHANGE UP (ref 6–8.3)
PROT UR-MCNC: NEGATIVE MG/DL — SIGNIFICANT CHANGE UP
RBC # BLD: 4.53 M/UL — SIGNIFICANT CHANGE UP (ref 4.2–5.8)
RBC # FLD: 12.5 % — SIGNIFICANT CHANGE UP (ref 10.3–14.5)
SODIUM SERPL-SCNC: 138 MMOL/L — SIGNIFICANT CHANGE UP (ref 135–145)
SP GR SPEC: >1.03 — HIGH (ref 1–1.03)
TROPONIN I, HIGH SENSITIVITY RESULT: 3.53 NG/L — SIGNIFICANT CHANGE UP
UROBILINOGEN FLD QL: 1 MG/DL — SIGNIFICANT CHANGE UP (ref 0.2–1)
WBC # BLD: 4.22 K/UL — SIGNIFICANT CHANGE UP (ref 3.8–10.5)
WBC # FLD AUTO: 4.22 K/UL — SIGNIFICANT CHANGE UP (ref 3.8–10.5)

## 2023-10-04 PROCEDURE — 74177 CT ABD & PELVIS W/CONTRAST: CPT | Mod: 26,MD

## 2023-10-04 RX ORDER — SODIUM CHLORIDE 9 MG/ML
1000 INJECTION INTRAMUSCULAR; INTRAVENOUS; SUBCUTANEOUS ONCE
Refills: 0 | Status: COMPLETED | OUTPATIENT
Start: 2023-10-04 | End: 2023-10-04

## 2023-10-04 RX ADMIN — SODIUM CHLORIDE 1000 MILLILITER(S): 9 INJECTION INTRAMUSCULAR; INTRAVENOUS; SUBCUTANEOUS at 01:42

## 2023-10-04 NOTE — ED PROVIDER NOTE - OBJECTIVE STATEMENT
33 year old male with PMH of HIV (from birth), GBS, asthma, cocaine abuse, presents with cc of having wiped after bowel movement with blood in the area. No cp, sob or palpitation. No nausea or vomiting. No fever or chills. No skin rash. No recent trauma. No melena. No hematochezia until wiping today. No nausea or vomiting. No saddle anesthesia. No incontinence of urine or stool.

## 2023-10-04 NOTE — ED ADULT NURSE NOTE - CAS DISCH TRANSFER METHOD
Protocol For Photochemotherapy: Mineral Oil And Broad Band Uvb: The patient received Photochemotherapy: Mineral Oil and Broad Band UVB. Protocol For Photochemotherapy: Tar And Broad Band Uvb (Goeckerman Treatment): The patient received Photochemotherapy: Tar and Broad Band UVB (Goeckerman treatment). Post-Care Instructions: I reviewed with the patient in detail post-care instructions. Patient is to wear sun protection. Patients may expect sunburn like redness, discomfort and scabbing. Protocol For Uva: The patient received UVA. Protocol For Photochemotherapy: Mineral Oil And Nbuvb: The patient received Photochemotherapy: Mineral Oil and NBUVB (mineral oil applied to all lesions prior to phototherapy). Protocol For Nb Uva: The patient received NB UVA. Protocol: Photochemotherapy: Petrolatum and NBUVB Protocol For Photochemotherapy For Severe Photoresponsive Dermatoses: Puva: The patient received Photochemotherapy for severe photoresponsive dermatoses: PUVA requiring at least 4 to 8 hours of care under direct physician supervision. Total Treatment Time: 2 minutes 45 seconds Protocol For Broad Band Uvb: The patient received Broad Band UVB. Protocol For Photochemotherapy: Petrolatum And Nbuvb: The patient received Photochemotherapy: Petrolatum and NBUVB (petrolatum applied to all lesions prior to phototherapy). Protocol For Protocol For Photochemotherapy For Severe Photoresponsive Dermatoses: Bath Puva: The patient received Photochemotherapy for severe photoresponsive dermatoses: Bath PUVA requiring at least 4 to 8 hours of care under direct physician supervision. Render Post-Care In The Note: no Protocol For Photochemotherapy For Severe Photoresponsive Dermatoses: Tar And Nbuvb (Goeckerman Treatment): The patient received Photochemotherapy for severe photoresponsive dermatoses: Tar and NBUVB (Goeckerman treatment) requiring at least 4 to 8 hours of care under direct physician supervision. Protocol For Photochemotherapy For Severe Photoresponsive Dermatoses: Petrolatum And Broad Band Uvb: The patient received Photochemotherapyfor severe photoresponsive dermatoses: Petrolatum and Broad Band UVB requiring at least 4 to 8 hours of care under direct physician supervision. Treatment Number: 11 Protocol For Photochemotherapy: Triamcinolone Ointment And Nbuvb: The patient received Photochemotherapy: Triamcinolone and NBUVB (triamcinolone ointment applied to all lesions prior to phototherapy). Protocol For Photochemotherapy: Tar And Nbuvb (Goeckerman Treatment): The patient received Photochemotherapy: Tar and NBUVB (Goeckerman treatment). Name Of Supervising Technician: gillian trujillo Lpn Protocol For Puva: The patient received PUVA. Protocol For Nbuvb: The patient received NBUVB. Protocol For Photochemotherapy: Petrolatum And Broad Band Uvb: The patient received Photochemotherapy: Petrolatum and Broad Band UVB. Protocol For Photochemotherapy: Baby Oil And Nbuvb: The patient received Photochemotherapy: Baby Oil and NBUVB (baby oil applied to all lesions prior to phototherapy). Consent: Written consent obtained.  The risks were reviewed with the patient including but not limited to: burn, pigmentary changes, pain, blistering, scabbing, redness, increased risk of skin cancers, and the remote possibility of scarring. Protocol For Bath Puva: The patient received Bath PUVA. Protocol For Uva1: The patient received UVA1. Protocol For Photochemotherapy For Severe Photoresponsive Dermatoses: Petrolatum And Nbuvb: The patient received Photochemotherapy for severe photoresponsive dermatoses: Petrolatum and NBUVB requiring at least 4 to 8 hours of care under direct physician supervision. Protocol For Photochemotherapy For Severe Photoresponsive Dermatoses: Tar And Broad Band Uvb (Goeckerman Treatment): The patient received Photochemotherapy for severe photoresponsive dermatoses: Tar and Broad Band UVB (Goeckerman treatment) requiring at least 4 to 8 hours of care under direct physician supervision. Detail Level: [employer]

## 2023-10-04 NOTE — CONSULT NOTE ADULT - ASSESSMENT
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 Schwab, as an outpatient after discharge  no further surgical intervention indicated at this time    Plan discussed with Dr. Schwab

## 2023-10-04 NOTE — ED PROVIDER NOTE - NSFOLLOWUPINSTRUCTIONS_ED_ALL_ED_FT
Rectal Bleeding    WHAT YOU NEED TO KNOW:    What can cause rectal bleeding?    Constipation    Hemorrhoids (swollen blood vessels in your rectum)    Anal fissures (tears in the tissue inside your anus)    Medical conditions, such as cancer, colitis, or diverticulitis    Growths, such as tumors or polyps    Medical treatments, such as radiation or rectal surgery  What increases my risk for rectal bleeding?    Older age    Certain medicines, such as blood thinners and NSAIDs    Medical conditions, such as inflammatory bowel disease, liver disease, or HIV  What other signs and symptoms may happen with rectal bleeding? You may have pain in your rectum or anus. You may also have abdominal pain or cramping.    How is the cause of rectal bleeding diagnosed?    Rectal exam: Your healthcare provider may gently insert a gloved finger into your anus. He or she will collect a bowel movement sample and send it to a lab for tests.    Blood tests: You may need blood taken to check for anemia (low amount of red blood cells).    CT scan: This test is also called a CAT scan. An x-ray machine uses a computer to take pictures of the organs and blood vessels in your abdomen. The pictures may show problems that could cause bleeding. You may be given a dye before the pictures are taken to help healthcare providers see the pictures better. Tell the healthcare provider if you have ever had an allergic reaction to contrast dye.    Colonoscopy: This is a procedure to look inside your lower bowel. It may show where the bleeding is coming from and what is causing it. A tube with a light on the end will be put into your anus and then moved into your colon. If your healthcare provider finds a growth, he or she may remove it.    Endoscopy: This is a procedure to look inside your upper bowel. It may show where the bleeding is coming from and what is causing it. A tube with a light on the end is inserted into your throat and moved down into your stomach and upper bowel. If your healthcare provider finds a growth, he or she may remove it. He or she may put a shot of medicine in bleeding areas to narrow the blood vessels and stop the bleeding. Heat, laser, or electric currents may also be used to make the blood clot.  How is rectal bleeding treated?    Medicine:  Pain medicine: You may be given a prescription medicine to decrease pain. Do not wait until the pain is severe before you take this medicine.    Vasoconstrictors: This medicine decreases the size of your blood vessels and may help stop the bleeding.    Iron supplement: Iron helps your body make more red blood cells.    Steroids: This medicine decreases inflammation in your rectum. It may be applied as a cream, ointment, or lotion.    IV: You may need an IV if you are dehydrated and need extra liquids.    A blood transfusion replaces blood in your body to help it work properly. A blood transfusion is given through an IV. Blood is tested for safety before it is used.    Surgery: You may need surgery to remove hemorrhoids, tumors, or polyps.  What are the risks of rectal bleeding?    You may have abdominal pain or damage to nearby organs and blood vessels with surgery. Even with treatment, rectal bleeding may continue. Or, it may go away for a time and start again.    Without treatment, you may continue to have pain and cramping. You may develop anemia. You may need a blood transfusion. You may lose a large amount of blood. This can be life-threatening.  How can I manage my symptoms? Ask your healthcare provider how much liquid to drink each day and which liquids are best for you. This will help prevent dehydration and constipation.    When should I contact my healthcare provider?    You have a fever.    Your rectal bleeding stopped for a time, but has started again.    You have nausea.    You have cold, sweaty, pale skin.    You have changes in your bowel movements, such as diarrhea.    You have questions or concerns about your condition or care.  When should I seek immediate care or call 911?    You are breathing faster than usual.    You are dizzy, lightheaded, or feel faint.    You are confused or cannot think clearly.    You urinate less than usual or not at all.    Your rectal bleeding is constant or heavy.    You have severe abdominal pain or cramping.  CARE AGREEMENT:    You have the right to help plan your care. Learn about your health condition and how it may be treated. Discuss treatment options with your healthcare providers to decide what care you want to receive. You always have the right to refuse treatment.

## 2023-10-04 NOTE — ED ADULT NURSE NOTE - NSFALLUNIVINTERV_ED_ALL_ED
Bed/Stretcher in lowest position, wheels locked, appropriate side rails in place/Call bell, personal items and telephone in reach/Instruct patient to call for assistance before getting out of bed/chair/stretcher/Non-slip footwear applied when patient is off stretcher/Clever to call system/Physically safe environment - no spills, clutter or unnecessary equipment/Purposeful proactive rounding/Room/bathroom lighting operational, light cord in reach

## 2023-10-04 NOTE — ED ADULT NURSE NOTE - OBJECTIVE STATEMENT
presents with cc of having wiped after bowel movement with blood in the area. No cp, sob or palpitation. No nausea or vomiting. No fever or chills. No skin rash. No recent trauma. No melena. No hematochezia until wiping today. No nausea or vomiting. No saddle anesthesia. No incontinence of urine or stool.

## 2023-10-04 NOTE — ED PROVIDER NOTE - CARE PROVIDER_API CALL
Jessie Schwab  Colon/Rectal Surgery  [address], [place] [address]  Phone: [phone]  Fax: [phone]  Follow Up Time:

## 2023-10-04 NOTE — ED PROVIDER NOTE - CLINICAL SUMMARY MEDICAL DECISION MAKING FREE TEXT BOX
Kathie BARRIGA: Received s/o from Dr. Quan at 6:30AM- cleared for dc home by colorectal surgery; STD testing recommended; ordered as requested; f/u with colorectal service; strict return precautions given.

## 2023-10-04 NOTE — ED PROVIDER NOTE - NSICDXPASTMEDICALHX_GEN_ALL_CORE_FT
PAST MEDICAL HISTORY:  Asthma     Chronic sinusitis     Closed fracture of multiple ribs of right side, initial encounter     Cocaine abuse     GBS (Guillain [place] syndrome)     HIV (human immunodeficiency virus infection) from birth    HIV disease     Homeless

## 2023-10-04 NOTE — ED ADULT NURSE NOTE - NSICDXPASTMEDICALHX_GEN_ALL_CORE_FT
PAST MEDICAL HISTORY:  Asthma     Chronic sinusitis     Closed fracture of multiple ribs of right side, initial encounter     Cocaine abuse     GBS (Guillain [place] syndrome)     HIV (human immunodeficiency virus infection) from birth    HIV disease     Homeless

## 2023-10-04 NOTE — CONSULT NOTE ADULT - SUBJECTIVE AND OBJECTIVE BOX
33 year old male with PMH of congenital HIV, GBS, asthma, cocaine abuse, presents with cc of blood on toilet paper and on stool after bowel movement. Denies abdominal pain, cp, sob or palpitation. No nausea or vomiting. No fever or chills. No recent trauma as per patient. No melena. CT shows no significant findings that would contribute to rectal bleeding. Colorectal surgery consulted.    Pt seen and examined at bedside, no acute events. Pt had no complaints.    Vital Signs Last 24 Hrs  T(C): 36.7 (04 Oct 2023 06:30), Max: 37.1 (03 Oct 2023 15:36)  T(F): 98.1 (04 Oct 2023 06:30), Max: 98.7 (03 Oct 2023 15:36)  HR: 76 (04 Oct 2023 06:30) (76 - 119)  BP: 124/53 (04 Oct 2023 06:30) (117/68 - 140/91)  BP(mean): 65 (04 Oct 2023 06:30) (65 - 104)  RR: 18 (04 Oct 2023 06:30) (18 - 18)  SpO2: 100% (04 Oct 2023 06:30) (97% - 100%)    Parameters below as of 04 Oct 2023 06:30  Patient On (Oxygen Delivery Method): room air                            13.6   4.22  )-----------( 274      ( 04 Oct 2023 00:47 )             40.4     10-04    138  |  106  |  12  ----------------------------<  118<H>  3.8   |  28  |  1.02    Ca    9.3      04 Oct 2023 00:47    TPro  7.7  /  Alb  3.5  /  TBili  0.4  /  DBili  x   /  AST  17  /  ALT  24  /  AlkPhos  65  10-04    I&O's Summary        Physical Exam:  Pt is AAOx3  General: Well developed, in no acute distress.   Chest: Lungs clear, no rales, no rhonchi, no wheezes.   Heart: RR, no murmurs, no rubs, no gallops.   Abdomen: Soft, no tenderness, nondistended, no masses, BS normal.    Rectum: anal condyloma present on visualization, no gross blood on exam or on ARLEEN  Back: Normal curvature, no tenderness.   Neuro: Physiological, no localizing findings.   Skin: Normal, no rashes, no lesions noted.   Extremities: Warm, well perfused, no edema, Pulses intact No

## 2023-10-04 NOTE — ED PROVIDER NOTE - PROGRESS NOTE DETAILS
Kadeem STEVENS: Colorectal consulted. Rectal exam with Ms. Deonte May shows an area of jame rectal abnormalities likely multiple HPV lesions. Consulted Colorectal via Dr. Leal. Awaiting call back for final disposition. Signed out the care of the patient to Dr. Vázquez.

## 2023-10-04 NOTE — ED PROVIDER NOTE - PATIENT PORTAL LINK FT
You can access the FollowMyHealth Patient Portal offered by [facility] by registering at the following website: [email or web address]. By joining [employer]’s FollowMyHealth portal, you will also be able to view your health information using other applications (apps) compatible with our system.

## 2023-10-05 LAB
C TRACH RRNA SPEC QL NAA+PROBE: SIGNIFICANT CHANGE UP
CULTURE RESULTS: SIGNIFICANT CHANGE UP
N GONORRHOEA RRNA SPEC QL NAA+PROBE: SIGNIFICANT CHANGE UP
SPECIMEN SOURCE: SIGNIFICANT CHANGE UP
SPECIMEN SOURCE: SIGNIFICANT CHANGE UP

## 2023-10-09 DIAGNOSIS — Z53.29 PROCEDURE AND TREATMENT NOT CARRIED OUT BECAUSE OF PATIENT'S DECISION FOR OTHER REASONS: ICD-10-CM

## 2023-10-09 DIAGNOSIS — G89.29 OTHER CHRONIC PAIN: ICD-10-CM

## 2023-10-09 DIAGNOSIS — R29.898 OTHER SYMPTOMS AND SIGNS INVOLVING THE MUSCULOSKELETAL SYSTEM: ICD-10-CM

## 2023-10-09 DIAGNOSIS — J32.9 CHRONIC SINUSITIS, UNSPECIFIED: ICD-10-CM

## 2023-10-09 DIAGNOSIS — J45.909 UNSPECIFIED ASTHMA, UNCOMPLICATED: ICD-10-CM
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
## 2023-10-09 DIAGNOSIS — G62.9 POLYNEUROPATHY, UNSPECIFIED: ICD-10-CM

## 2023-10-09 DIAGNOSIS — R51.9 HEADACHE, UNSPECIFIED: ICD-10-CM

## 2023-10-09 DIAGNOSIS — G99.2 MYELOPATHY IN DISEASES CLASSIFIED ELSEWHERE: ICD-10-CM

## 2023-10-09 DIAGNOSIS — G65.0 SEQUELAE OF GUILLAIN-BARRE SYNDROME: ICD-10-CM

## 2023-10-09 DIAGNOSIS — S80.02XA CONTUSION OF LEFT KNEE, INITIAL ENCOUNTER: ICD-10-CM

## 2023-10-09 DIAGNOSIS — Z88.1 ALLERGY STATUS TO OTHER ANTIBIOTIC AGENTS STATUS: ICD-10-CM

## 2023-10-09 DIAGNOSIS — Z87.81 PERSONAL HISTORY OF (HEALED) TRAUMATIC FRACTURE: ICD-10-CM

## 2023-10-09 DIAGNOSIS — F14.10 COCAINE ABUSE, UNCOMPLICATED: ICD-10-CM

## 2023-10-09 DIAGNOSIS — M54.50 LOW BACK PAIN, UNSPECIFIED: ICD-10-CM
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
## 2023-10-09 DIAGNOSIS — R26.89 OTHER ABNORMALITIES OF GAIT AND MOBILITY: ICD-10-CM

## 2023-10-09 DIAGNOSIS — B20 HUMAN IMMUNODEFICIENCY VIRUS [HIV] DISEASE: ICD-10-CM

## 2023-10-09 DIAGNOSIS — Z88.6 ALLERGY STATUS TO ANALGESIC AGENT: ICD-10-CM

## 2023-10-09 DIAGNOSIS — M54.12 RADICULOPATHY, CERVICAL REGION: ICD-10-CM

## 2023-10-10 NOTE — ED ADULT NURSE NOTE - NSTOBACCO QUIT READY_GEN_A_CORE_SD
[address]  [address] [address]  Dept: [phone]  Loc: [phone]       Visit Date:10/10/2023     Franko Dykes is a 70 y.o. male who presents today for:   Chief Complaint   Patient presents with    Follow-up     Malignant neoplasm of lower third of esophagus ([address])        HPI:   Franko Dykes is a 70 y.o. male with esophageal cancer. 12/09/2021 The patient underwent an EGD which showed a fungating and ulcerating mass of the distal esophagus. Biopsy results revealed poorly differentiated adenocarcinoma, Her2 Shey Negative. PET/CT scan confirmed the presence of a lytic lesion on the L1 vertebral body. There was also mildly avid thoracic lymphadenopathy and indeterminate peritoneal nodularity. MRI of the thoracic and lumbar spine revealed no evidence of malignancy within the thoracic spine, but noted evidence of malignancy involving L1, L2, and L3 vertebral bodies in the lumbar spine. The patient received FOLFOX plus Opdivo 2/16/22 to 5/9/22, followed by single agent Opdivo started in June 2022.      9/9/2022 PET CT scan confirmed slight disease progression, according to RECIST criteria, lesions did not grow by 25%, so  opdivo was continued. 10/24/2022 The patient underwent evaluation with a CT of the upper right extremity for complaints of right arm pain. Imaging confirmed osseous involvement       11/01/2022 the patient initiated treatment with palliative RT     11/21/2022 Initiated treatment with FOLFIRI plus Opdivo. 12/19/2022: The patient returns for follow-up on his diagnosis of metastatic esophageal cancer and previously prescribed treatment with FOLFIRI plus Opdivo. He is scheduled for cycle 3 today. The patient complains of a chronic cough with scant amount of white/yellow sputum. He denies any fevers or night sweats. The patient reports he feels constriction with swallowing.   He not motivated to quit

## 2023-10-12 ENCOUNTER — TRANSCRIPTION ENCOUNTER (OUTPATIENT)
Age: 33
End: 2023-10-12

## 2023-10-28 ENCOUNTER — EMERGENCY (EMERGENCY)
Facility: HOSPITAL | Age: 33
LOS: 1 days | Discharge: LEFT BEFORE DISCHARGE | End: 2023-10-28
Attending: EMERGENCY MEDICINE | Admitting: STUDENT IN AN ORGANIZED HEALTH CARE EDUCATION/TRAINING PROGRAM

## 2023-10-28 VITALS
RESPIRATION RATE: 16 BRPM | SYSTOLIC BLOOD PRESSURE: 144 MMHG | OXYGEN SATURATION: 98 % | HEIGHT: 72 IN | TEMPERATURE: 98 F | DIASTOLIC BLOOD PRESSURE: 86 MMHG | HEART RATE: 99 BPM

## 2023-10-28 VITALS
RESPIRATION RATE: 18 BRPM | OXYGEN SATURATION: 98 % | TEMPERATURE: 98 F | HEART RATE: 95 BPM | SYSTOLIC BLOOD PRESSURE: 132 MMHG | DIASTOLIC BLOOD PRESSURE: 82 MMHG

## 2023-10-28 DIAGNOSIS — R26.81 UNSTEADINESS ON FEET: ICD-10-CM

## 2023-10-28 DIAGNOSIS — Z98.890 OTHER SPECIFIED POSTPROCEDURAL STATES: Chronic | ICD-10-CM

## 2023-10-28 LAB
ALBUMIN SERPL ELPH-MCNC: 4.4 G/DL — SIGNIFICANT CHANGE UP (ref 3.3–5)
ALBUMIN SERPL ELPH-MCNC: 4.4 G/DL — SIGNIFICANT CHANGE UP (ref 3.3–5)
ALP SERPL-CCNC: 57 U/L — SIGNIFICANT CHANGE UP (ref 40–120)
ALP SERPL-CCNC: 57 U/L — SIGNIFICANT CHANGE UP (ref 40–120)
ALT FLD-CCNC: 13 U/L — SIGNIFICANT CHANGE UP (ref 4–41)
ALT FLD-CCNC: 13 U/L — SIGNIFICANT CHANGE UP (ref 4–41)
ANION GAP SERPL CALC-SCNC: 12 MMOL/L — SIGNIFICANT CHANGE UP (ref 7–14)
ANION GAP SERPL CALC-SCNC: 12 MMOL/L — SIGNIFICANT CHANGE UP (ref 7–14)
AST SERPL-CCNC: 22 U/L — SIGNIFICANT CHANGE UP (ref 4–40)
AST SERPL-CCNC: 22 U/L — SIGNIFICANT CHANGE UP (ref 4–40)
BASOPHILS # BLD AUTO: 0.01 K/UL — SIGNIFICANT CHANGE UP (ref 0–0.2)
BASOPHILS # BLD AUTO: 0.01 K/UL — SIGNIFICANT CHANGE UP (ref 0–0.2)
BASOPHILS NFR BLD AUTO: 0.2 % — SIGNIFICANT CHANGE UP (ref 0–2)
BASOPHILS NFR BLD AUTO: 0.2 % — SIGNIFICANT CHANGE UP (ref 0–2)
BILIRUB SERPL-MCNC: 0.5 MG/DL — SIGNIFICANT CHANGE UP (ref 0.2–1.2)
BILIRUB SERPL-MCNC: 0.5 MG/DL — SIGNIFICANT CHANGE UP (ref 0.2–1.2)
BUN SERPL-MCNC: 21 MG/DL — SIGNIFICANT CHANGE UP (ref 7–23)
BUN SERPL-MCNC: 21 MG/DL — SIGNIFICANT CHANGE UP (ref 7–23)
CALCIUM SERPL-MCNC: 9.1 MG/DL — SIGNIFICANT CHANGE UP (ref 8.4–10.5)
CALCIUM SERPL-MCNC: 9.1 MG/DL — SIGNIFICANT CHANGE UP (ref 8.4–10.5)
CHLORIDE SERPL-SCNC: 102 MMOL/L — SIGNIFICANT CHANGE UP (ref 98–107)
CHLORIDE SERPL-SCNC: 102 MMOL/L — SIGNIFICANT CHANGE UP (ref 98–107)
CO2 SERPL-SCNC: 25 MMOL/L — SIGNIFICANT CHANGE UP (ref 22–31)
CO2 SERPL-SCNC: 25 MMOL/L — SIGNIFICANT CHANGE UP (ref 22–31)
CREAT SERPL-MCNC: 0.88 MG/DL — SIGNIFICANT CHANGE UP (ref 0.5–1.3)
CREAT SERPL-MCNC: 0.88 MG/DL — SIGNIFICANT CHANGE UP (ref 0.5–1.3)
EGFR: 116 ML/MIN/1.73M2 — SIGNIFICANT CHANGE UP
EGFR: 116 ML/MIN/1.73M2 — SIGNIFICANT CHANGE UP
EOSINOPHIL # BLD AUTO: 0.05 K/UL — SIGNIFICANT CHANGE UP (ref 0–0.5)
EOSINOPHIL # BLD AUTO: 0.05 K/UL — SIGNIFICANT CHANGE UP (ref 0–0.5)
EOSINOPHIL NFR BLD AUTO: 0.9 % — SIGNIFICANT CHANGE UP (ref 0–6)
EOSINOPHIL NFR BLD AUTO: 0.9 % — SIGNIFICANT CHANGE UP (ref 0–6)
GLUCOSE SERPL-MCNC: 122 MG/DL — HIGH (ref 70–99)
GLUCOSE SERPL-MCNC: 122 MG/DL — HIGH (ref 70–99)
HCT VFR BLD CALC: 38.9 % — LOW (ref 39–50)
HCT VFR BLD CALC: 38.9 % — LOW (ref 39–50)
HGB BLD-MCNC: 13 G/DL — SIGNIFICANT CHANGE UP (ref 13–17)
HGB BLD-MCNC: 13 G/DL — SIGNIFICANT CHANGE UP (ref 13–17)
IANC: 2.76 K/UL — SIGNIFICANT CHANGE UP (ref 1.8–7.4)
IANC: 2.76 K/UL — SIGNIFICANT CHANGE UP (ref 1.8–7.4)
IMM GRANULOCYTES NFR BLD AUTO: 0.2 % — SIGNIFICANT CHANGE UP (ref 0–0.9)
IMM GRANULOCYTES NFR BLD AUTO: 0.2 % — SIGNIFICANT CHANGE UP (ref 0–0.9)
LYMPHOCYTES # BLD AUTO: 2.43 K/UL — SIGNIFICANT CHANGE UP (ref 1–3.3)
LYMPHOCYTES # BLD AUTO: 2.43 K/UL — SIGNIFICANT CHANGE UP (ref 1–3.3)
LYMPHOCYTES # BLD AUTO: 41.9 % — SIGNIFICANT CHANGE UP (ref 13–44)
LYMPHOCYTES # BLD AUTO: 41.9 % — SIGNIFICANT CHANGE UP (ref 13–44)
MCHC RBC-ENTMCNC: 29.3 PG — SIGNIFICANT CHANGE UP (ref 27–34)
MCHC RBC-ENTMCNC: 29.3 PG — SIGNIFICANT CHANGE UP (ref 27–34)
MCHC RBC-ENTMCNC: 33.4 GM/DL — SIGNIFICANT CHANGE UP (ref 32–36)
MCHC RBC-ENTMCNC: 33.4 GM/DL — SIGNIFICANT CHANGE UP (ref 32–36)
MCV RBC AUTO: 87.6 FL — SIGNIFICANT CHANGE UP (ref 80–100)
MCV RBC AUTO: 87.6 FL — SIGNIFICANT CHANGE UP (ref 80–100)
MONOCYTES # BLD AUTO: 0.54 K/UL — SIGNIFICANT CHANGE UP (ref 0–0.9)
MONOCYTES # BLD AUTO: 0.54 K/UL — SIGNIFICANT CHANGE UP (ref 0–0.9)
MONOCYTES NFR BLD AUTO: 9.3 % — SIGNIFICANT CHANGE UP (ref 2–14)
MONOCYTES NFR BLD AUTO: 9.3 % — SIGNIFICANT CHANGE UP (ref 2–14)
NEUTROPHILS # BLD AUTO: 2.76 K/UL — SIGNIFICANT CHANGE UP (ref 1.8–7.4)
NEUTROPHILS # BLD AUTO: 2.76 K/UL — SIGNIFICANT CHANGE UP (ref 1.8–7.4)
NEUTROPHILS NFR BLD AUTO: 47.5 % — SIGNIFICANT CHANGE UP (ref 43–77)
NEUTROPHILS NFR BLD AUTO: 47.5 % — SIGNIFICANT CHANGE UP (ref 43–77)
NRBC # BLD: 0 /100 WBCS — SIGNIFICANT CHANGE UP (ref 0–0)
NRBC # BLD: 0 /100 WBCS — SIGNIFICANT CHANGE UP (ref 0–0)
NRBC # FLD: 0 K/UL — SIGNIFICANT CHANGE UP (ref 0–0)
NRBC # FLD: 0 K/UL — SIGNIFICANT CHANGE UP (ref 0–0)
PLATELET # BLD AUTO: 256 K/UL — SIGNIFICANT CHANGE UP (ref 150–400)
PLATELET # BLD AUTO: 256 K/UL — SIGNIFICANT CHANGE UP (ref 150–400)
POTASSIUM SERPL-MCNC: 3.4 MMOL/L — LOW (ref 3.5–5.3)
POTASSIUM SERPL-MCNC: 3.4 MMOL/L — LOW (ref 3.5–5.3)
POTASSIUM SERPL-SCNC: 3.4 MMOL/L — LOW (ref 3.5–5.3)
POTASSIUM SERPL-SCNC: 3.4 MMOL/L — LOW (ref 3.5–5.3)
PROT SERPL-MCNC: 7.5 G/DL — SIGNIFICANT CHANGE UP (ref 6–8.3)
PROT SERPL-MCNC: 7.5 G/DL — SIGNIFICANT CHANGE UP (ref 6–8.3)
RBC # BLD: 4.44 M/UL — SIGNIFICANT CHANGE UP (ref 4.2–5.8)
RBC # BLD: 4.44 M/UL — SIGNIFICANT CHANGE UP (ref 4.2–5.8)
RBC # FLD: 12.4 % — SIGNIFICANT CHANGE UP (ref 10.3–14.5)
RBC # FLD: 12.4 % — SIGNIFICANT CHANGE UP (ref 10.3–14.5)
SODIUM SERPL-SCNC: 139 MMOL/L — SIGNIFICANT CHANGE UP (ref 135–145)
SODIUM SERPL-SCNC: 139 MMOL/L — SIGNIFICANT CHANGE UP (ref 135–145)
WBC # BLD: 5.8 K/UL — SIGNIFICANT CHANGE UP (ref 3.8–10.5)
WBC # BLD: 5.8 K/UL — SIGNIFICANT CHANGE UP (ref 3.8–10.5)
WBC # FLD AUTO: 5.8 K/UL — SIGNIFICANT CHANGE UP (ref 3.8–10.5)
WBC # FLD AUTO: 5.8 K/UL — SIGNIFICANT CHANGE UP (ref 3.8–10.5)

## 2023-10-28 RX ORDER — BICTEGRAVIR SODIUM, EMTRICITABINE, AND TENOFOVIR ALAFENAMIDE FUMARATE 30; 120; 15 MG/1; MG/1; MG/1
1 TABLET ORAL DAILY
Refills: 0 | Status: DISCONTINUED | OUTPATIENT
Start: 2023-10-28 | End: 2023-10-28

## 2023-10-28 RX ORDER — GABAPENTIN 400 MG/1
300 CAPSULE ORAL ONCE
Refills: 0 | Status: COMPLETED | OUTPATIENT
Start: 2023-10-28 | End: 2023-10-28

## 2023-10-28 RX ORDER — DEXTROSE 50 % IN WATER 50 %
12.5 SYRINGE (ML) INTRAVENOUS ONCE
Refills: 0 | Status: DISCONTINUED | OUTPATIENT
Start: 2023-10-28 | End: 2023-10-28

## 2023-10-28 RX ORDER — BACLOFEN 100 %
10 POWDER (GRAM) MISCELLANEOUS ONCE
Refills: 0 | Status: COMPLETED | OUTPATIENT
Start: 2023-10-28 | End: 2023-10-28

## 2023-10-28 RX ADMIN — GABAPENTIN 300 MILLIGRAM(S): 400 CAPSULE ORAL at 12:41

## 2023-10-28 RX ADMIN — Medication 10 MILLIGRAM(S): at 12:41

## 2023-10-28 RX ADMIN — BICTEGRAVIR SODIUM, EMTRICITABINE, AND TENOFOVIR ALAFENAMIDE FUMARATE 1 TABLET(S): 30; 120; 15 TABLET ORAL at 12:41

## 2023-10-28 NOTE — PROVIDER CONTACT NOTE (OTHER) - ASSESSMENT
As per ED team, Pt needs initial PT evaluation in order to go to sub acute rehab, covering PT (Jocelyn) was informed and PT recommended [facility] placement, Spoke with Pt at the bedside he is in agreement with Plan, List of [facility] facility in [facility] was provided to Pt by covering ED SW. As per ED team Pt will be admitted to [facility] for further work-up. Covering CM/SW will follow with d/c plan. As per ED team, Pt needs initial PT evaluation in order to go to sub acute rehab, covering PT (Jocelyn) was informed and PT recommended [facility] placement, Spoke with Pt at the bedside he is in agreement with Plan, List of [facility] facility in [facility] was provided and  covering ED SW notified. As per ED team Pt will be admitted to [facility] for further work-up. Covering CM/SW will follow with d/c plan.

## 2023-10-28 NOTE — ED PROVIDER NOTE - CLINICAL SUMMARY MEDICAL DECISION MAKING FREE TEXT BOX
33-year-old male past medical history of Guillain [place] syndrome in 2017, HIV (from birth, unsure of CD4/viral count, on ART–Biktarvy,) cocaine abuse, asthma (never intubated) presenting with request for subacute rehab.    Will speak to SW to discuss plan. Per chart review pt is on serval medications with multiple different doses but pt unable to confirm all medications/doses including: baclofen 10 TID, cyclobenzaprine 10, Biktarvy -25, albuterol, cyanocobalamin, gabapentin 300, Lyrica 150, oxycodone 5, Robaxin 500 mg,  quetiapine..    Per review appear Pt saw pt at [facility] in past and was not felt that he needs subacute care but would benefit from assistance. Will give some home medications that I can confirm. Pt would like benefit most from home PO/OT and pain management given med reconciliation showing multiple muscle relaxants and pain medications that can interact/ have additive affects. Will speak to Social work/ and come up with a plan. 33-year-old male past medical history of Guillain [place] syndrome in 2017, HIV (from birth, unsure of CD4/viral count, on ART–Biktarvy,) cocaine abuse, asthma (never intubated) presenting with request for subacute rehab.    Will speak to SW to discuss plan. Per chart review pt is on serval medications with multiple different doses but pt unable to confirm all medications/doses including: baclofen 10 TID, cyclobenzaprine 10, Biktarvy -25, albuterol, cyanocobalamin, gabapentin 300, Lyrica 150, oxycodone 5, Robaxin 500 mg,  quetiapine..    Per review appear Pt saw pt at [facility] in past and was not felt that he needs subacute care but would benefit from assistance. Will give some home medications that I can confirm. Pt would like benefit most from home PT/OT and pain management given med reconciliation showing multiple muscle relaxants and pain medications that can interact/ have additive affects. Will speak to Social work/ and come up with a plan.

## 2023-10-28 NOTE — PHYSICAL THERAPY INITIAL EVALUATION ADULT - GENERAL OBSERVATIONS, REHAB EVAL
Patient found semi-reclined in bed, NAD, A&Ox4, reports weakness and pain in bilateral lower extremities and back, Pt. in agreement to participate in skilled therapy session despite complaints of pain, spO2 100% on room air

## 2023-10-28 NOTE — PROVIDER CONTACT NOTE (OTHER) - SITUATION
SW contacted by medical team, patient left [facility] AMA reporting he didn't feel they were taking care of his needs. Medical team would like patient to be placed [facility] SW contacted by medical team, patient left [facility] AMA due to him believing they were not taking care of his needs. Medical team would like patient to be placed in a [facility]

## 2023-10-28 NOTE — PHYSICAL THERAPY INITIAL EVALUATION ADULT - LEVEL OF INDEPENDENCE: GAIT, REHAB EVAL
unable to assess due to noted weakness in left lower extremity, patient unable to walk at this time due to weakness

## 2023-10-28 NOTE — PHYSICAL THERAPY INITIAL EVALUATION ADULT - RANGE OF MOTION EXAMINATION, REHAB EVAL
impairments in active knee extension bilaterally due to weakness/bilateral upper extremity ROM was WFL (within functional limits)/bilateral lower extremity ROM was WFL (within functional limits)

## 2023-10-28 NOTE — ED ADULT NURSE NOTE - NSFALLRISKINTERV_ED_ALL_ED

## 2023-10-28 NOTE — PHYSICAL THERAPY INITIAL EVALUATION ADULT - PERTINENT HX OF CURRENT PROBLEM, REHAB EVAL
Patient is a 33-year-old male past medical history of Guillain [place] syndrome in 2017, HIV (from birth,) cocaine abuse, asthma (never intubated) presenting with request for subacute rehab. Pt lives at home with his mother-in-law.  2 weeks ago was at [facility] for back/leg rehab but did not feel was being treated/managed therefore signed out AMA and family picked him up. States he would like to be placed in some sort of rehab facility. Reports after signing out AMA was not refilled his medications, therefore has not had his medication in 1 day.  is on multiple pain medications/muscle relaxants. and Biktarvy but can not confirm the doses. Endorses chronic back and b/l leg weakness and pain, in addition to urine incontinence that is unchanged from baseline.

## 2023-10-28 NOTE — ED ADULT NURSE NOTE - OBJECTIVE STATEMENT
Break RN: pt a&ox3 and ambulatory at baseline, presents to room 26 c/o generalized back pain, bilateral leg pain, and bilateral foot pain. pt reporting difficulty with ambulation and associated weakness. pt also endorsing intermittent numbness/tingling in bilateral feet. pt denies chest pain, sob, n/v/d, abdominal pain. pt with baseline urinary incontinence. pt in NAD at this time. respirations are even and nonlabored on room air. appears uncomfortable, safety maintained. medicated as ordered per EMR with no acute adverse reactions. pt pending SW evaluation. provided with food.

## 2023-10-28 NOTE — ED ADULT TRIAGE NOTE - CHIEF COMPLAINT QUOTE
[place] [facility] c/o worsening chronic bilat LE and back pain. Pt trying to seek placement for rehab for GBS. Pt left [facility] yesterday AMA. PMHx: GBS (dx'd 2017), HIV, asthma.

## 2023-10-28 NOTE — ED PROVIDER NOTE - OBJECTIVE STATEMENT
33-year-old male past medical history of Guillain [place] syndrome in 2017, HIV (from birth, unsure of CD4/viral count, on ART–Biktarvy,) cocaine abuse, asthma (never intubated) presenting with request for subacute rehab.    Pt lives at home with his mother-in-law.  2 weeks ago was at [facility] for back/leg rehab but did not feel was being treated/managed therefore signed out AMA and family picked him up. States he would like to be placed in some sort of rehab facility. Reports after signing out AMA was not refilled his medications, therefore has not had his medication in 1 day. States is on multiple pain medications/muscle relaxants. and Biktarvy but can not confirm the doses. Endorses chronic back and b/l leg weakness and pain, in addition to urine incontinence that is unchanged from baseline.     Denies fevers, chills, chest pain, shortness of breath, dysuria, testicular pain or swelling, rash, syncope, dizziness, rash, cough or congestion, paresthesias, worsening weakness, neck pain or stiffness,

## 2023-10-28 NOTE — ED PROVIDER NOTE - ATTENDING APP SHARED VISIT CONTRIBUTION OF CARE
Patient examined, laying comfortably, heent nml heart s1s2, lungs clear, Lower extrem 3-4/5, pulses intact. Sensation intact. no edema.

## 2023-10-28 NOTE — PROVIDER CONTACT NOTE (OTHER) - BACKGROUND
SW reached out ot CM and was informed patient will need to obtain a PT evaluation to be referred to [facility]. SW reached out via teams to PT. SW reached out ot CM and was informed patient will need to obtain a PT evaluation to be referred to [facility]. ROSALINO reached out via teams to PT and advised medical team. SW reached out to CM and was informed patient will need to obtain a PT evaluation to be referred to [facility]. SW reached out via teams to PT and advised medical team.

## 2023-10-28 NOTE — ED ADULT NURSE NOTE - NSICDXPASTMEDICALHX_GEN_ALL_CORE_FT
PAST MEDICAL HISTORY:  Asthma     Chronic sinusitis     Closed fracture of multiple ribs of right side, initial encounter     Cocaine abuse     GBS (Guillain [place] syndrome)     HIV (human immunodeficiency virus infection) from birth    HIV disease     Homeless

## 2023-10-28 NOTE — ED PROVIDER NOTE - NSICDXPASTMEDICALHX_GEN_ALL_CORE_FT
PAST MEDICAL HISTORY:  Asthma     Chronic sinusitis     Closed fracture of multiple ribs of right side, initial encounter     Cocaine abuse     GBS (Guillain [place] syndrome)     HIV (human immunodeficiency virus infection) from birth    HIV disease     Homeless

## 2023-10-28 NOTE — PROVIDER CONTACT NOTE (OTHER) - ASSESSMENT
PT assessed patient and patient will be admitted. Patient was also provided with a list of [facility]. PT assessed patient and patient will be admitted. Patient was also provided with a list of [facility] by CM.

## 2023-10-28 NOTE — ED PROVIDER NOTE - NSICDXPASTSURGICALHX_GEN_ALL_CORE_FT
PAST SURGICAL HISTORY:  History of orthopedic surgery left arm    
Samples Given: Otezla x 2 starter packs.
Detail Level: Zone

## 2023-10-28 NOTE — PHYSICAL THERAPY INITIAL EVALUATION ADULT - ADDITIONAL COMMENTS
As per patient he lived in a home with his parents with 4 steps to enter, 5 steps within the home to get to bedroom. Patient reports being fully independent prior to previous hospitalization. As per previous PT documentation, patient was homeless.     Patient left semi-reclined in bed, NAD, all lines and tubes intact, call bell within reach.

## 2023-10-28 NOTE — ED ADULT NURSE NOTE - NSFALLRISKFACTORS_ED_ALL_ED
MU2 Ambulatory Summary

 Created on: 2015



Ambar Grider

External Reference #: [id number]

: 1952

Sex: Female



Demographics







 Address  [address]

[place]  [address]

 

 Home Phone  [phone]

 

 Preferred Language  English

 

 Marital Status  

 

 [employer] Affiliation  Unknown

 

 Race  White

 

 Ethnic Group  Not  or 





Author







 Mateo Pathak

 

 [facility]

 

 Address  [address]

[place], [place]  [id number]



 

 Phone  [phone]







Care Team Providers







 Care Team Member Name  Role  Phone

 

 Mateo Cooper  PCP  Unavailable







Allergies and Adverse Reactions







 Name  Reaction  Notes

 

 Phenergan      







Plan of Treatment







 Planned Activity  Comments  Planned Date  Planned Time  Plan/Goal

 

 Uncontrolled Diabetes and Thyroid Nodules            

 

 AIRWAY INHALATION TREATMENT     10/8/2013  12:00 AM   

 

 BREATHING CAPACITY TEST     2014  12:00 AM   







Medications







 Active 

 

 Name  Start Date  Estimated Completion Date  SIG  Comments

 

 Pen Needle 31 gauge x 3/16" miscellaneous needle  2014     USES 3 
INJECTIONS A DAY   

 

 metformin 1,000 mg oral tablet  2014     take 1 tablet (1,000 mg) by oral 
route 2 times per day with morning and evening meals   

 

 gabapentin 600 mg oral tablet  2014  TAKE 1 TABLET BY MOUTH 
FOUR TIMES DAILY   

 

 Novolog Flexpen 100 unit/mL subcutaneous insulin pen  2015     INJECT 12 
UNITS SUBCUTANEOUSLY WITH BREAKFAST. LUNCH, AND SUPPER   

 

 metformin 1,000 mg oral tablet  2015     TAKE 1 TABLET BY MOUTH TWICE 
DAILY WITH MORNING AND EVENING MEALS   

 

 metformin 1,000 mg oral tablet  2015     TAKE 1 TABLET BY MOUTH TWICE 
DAILY WITH MORNING AND EVENING MEALS   

 

 carvedilol 3.125 mg oral tablet        take 1 tablet (3.125 mg) by oral route 
once daily   

 

 Benicar 40 mg oral tablet        take 1 tablet (40 mg) by oral route once 
daily   

 

 Levemir FlexTouch 100 unit/mL (3 mL) subcutaneous insulin pen  12/3/2015     
inject 45 units by subcutaneous route twice a day   

 

 tramadol 50 mg oral tablet  12/3/2015     take 1 tablet (50 mg) by oral route 
every 6 hours as needed   

 

 Xarelto 20 mg oral tablet  12/3/2015  2016  take 1 tablet (20 mg) by oral 
route once daily with the evening meal for 30 days   









  

 

 Name  Start Date  Expiration Date  SIG  Comments

 

 Bactrim -160 mg oral tablet  2010  take 1 tablet by oral 
route every 12 hours for 10 days   

 

 Keflex 500 mg oral capsule  2010  take 1 capsule (500 mg) by 
oral route every 12 hours for 7 days   

 

 Reglan 10 mg oral tablet  2011  6/3/2011  take 1 tablet (10 mg) by oral 
route twice daily   

 

 prednisone 20 mg oral tablet  2011  take 1 tablet by oral 
route daily for 10 days   

 

 nabumetone 500 mg oral tablet  2011     take 1 tablet (500 mg) by oral 
route 2 times per day with food   

 

 indomethacin 50 mg oral capsule  2012  take 1 capsule (50 mg) 
by oral route 3 times per day with food for 7 days  "stopped it 2-3 weeks ago 
due to high glucose"

 

 Bactrim -160 mg oral tablet  2012  take 1 tablet by oral 
route every 12 hours for 7 days   

 

 insulin syringe-needle U-100 1 mL 31 x 5/16" miscellaneous syringe  2012  USE AS DIRECTED FOUR TIMES DAILY   

 

 Levemir 100 unit/mL subcutaneous solution  2012  inject 45 
units by subcutaneous route twice daily  "using pen"

 

 Augmentin 500-125 mg oral tablet  2012  take 1 tablet by oral 
route every 12 hours for 7 days   

 

 Pen Needle 31 gauge x 3/16" miscellaneous needle  2013  Uses 3 
injections a day   

 

 cephalexin 500 mg oral tablet  2013  take 1 tablet (500 mg) by 
oral route every 12 hours for 10 days   

 

 Augmentin 500-125 mg oral tablet  2013  take 1 tablet by oral 
route every 12 hours for 7 days   

 

 Accu-Chek Compact Test Miscellaneous Strip  1/3/2014  2014  TEST AS 
DIRECTED FOUR TIMES DAILY   

 

 Novolog Flexpen 100 unit/mL subcutaneous insulin pen  2014  
INJECT 12 UNITS SUBCUTANEOUSLY WITH BREAKFAST. LUNCH, AND SUPPER   









 Discontinued 

 

 Name  Start Date  Discontinued Date  SIG  Comments

 

 Aldara 5 % topical cream in packet  2010  3/15/2011  apply to the affected 
area(s) before bedtime by topical route 3 times per week and leave on skin for 
6 to 10 hours   

 

 fenofibrate 160 mg oral tablet  2010  take 1 tablet (160 mg) by 
oral route once daily   

 

 meloxicam 15 mg oral tablet  3/15/2011  2011  take 1 tablet (15 mg) by 
oral route once daily as needed   

 

 Zofran (as hydrochloride) 4 mg/5 mL oral solution  2011  take 5-
10 milliliters by oral route every 6 hours as needed   

 

 Klor-Con M20 20 mEq oral tablet,ER particles/crystals     3/5/2013  take 1 
tablet (20 meq) by oral route once daily with food  "not takiing"

 

 Diflucan 150 mg oral tablet  8/10/2011  2012  take 1 tablet (150 mg) by 
oral route once   

 

 amoxicillin 500 mg oral capsule     2012  take 1 capsule (500 mg) by oral 
route every 12 hours for 7 days   

 

 Medrol (Dominic) 4 mg oral tablets,dose pack     2012  take as directed   

 

 Lasix 20 mg oral tablet  4/10/2012  3/5/2013  take 1 tablet (20 mg) by oral 
route once daily as needed   

 

 amitriptyline 10 mg oral tablet  7/3/2012  2015  take 1 tablet by oral 
route once a day (at bedtime)   

 

 cyclobenzaprine 5 mg oral tablet  7/3/2012  10/8/2013  take 1 tablet by oral 
route once a day (at bedtime) as needed   

 

 cephalexin 500 mg oral tablet  7/3/2012  3/5/2013  take 1 tablet (500 mg) by 
oral route every 12 hours   

 

 Tessalon 200 mg oral capsule  2012  take 1 capsule (200 mg) by 
oral route 3 times per day as needed   

 

 Claritin-D 12 Hour 5-120 mg oral tablet extended release 12 hr  2012  3/5/
2013  take 1 tablet by oral route every 12 hours   

 

 naproxen 500 mg oral tablet  2014  take 1 tablet (500 mg) by 
oral route 2 times per day with food  "not taking now"

 

 Dulera 100-5 mcg/actuation inhalation HFA aerosol inhaler  10/8/2013  12/3/
2015  inhale 2 puffs by inhalation route 2 times per day in the morning and 
evening   

 

 flecainide 100 mg oral tablet     2015  take 1 tablet (100 mg) by oral 
route every 12 hours   

 

 cyclobenzaprine 10 mg oral tablet  2014  take 1 tablet (10 mg
) by oral route at bedtime as needed   

 

 Betapace 80 mg oral tablet     2015  take 1 tablet (80 mg) by oral route 
2 times per day  Dr. Turner

 

 duloxetine 30 mg oral capsule,delayed release(/EC)  2014  12/3/2015  
take 1 capsule (30 mg) by oral route once a day   

 

 hydrocodone-acetaminophen 7.5-325 mg oral tablet  2014  12/3/2015  take 
1 tablet by oral route every 6 hours as needed for pain   

 

 diltiazem HCl 120 mg oral capsule, extended release     12/3/2015  take 1 
capsule (120 mg) by oral route once daily   

 

 carvedilol 12.5 mg oral tablet     12/3/2015  take 1 tablet (12.5 mg) by oral 
route every 12 hours with food  dose change

 

 sotalol 120 mg oral tablet     12/3/2015  take 1 tablet (120 mg) by oral route 
2 times per day   







Problem List







 Description  Status  Onset

 

 Diabetes Mellitus, Type II  Active   

 

 Hemangioma in Spine  Active   

 

 Hyperlipidemia  Active   







Vital Signs







 Date  Time  BP-Sys(mm[Hg]  BP-Missy(mm[Hg])  HR(bpm)  RR(rpm)  Temp  WT  HT  HC  
BMI  BSA  BMI Percentile  O2 Sat(%)

 

 12/3/2015  10:17:00 AM  138 mmHg  72 mmHg  80 bpm  20 rpm  98.2 F  152 lbs  61 
in     28.72 kg/m2  1.72 m2     98 %

 

 2015  10:31:00 AM  130 mmHg  66 mmHg  66 bpm  20 rpm  98.1 F  143 lbs  61 
in     27.0193 kg/m  1.6708 m     98 %

 

 2014  9:39:00 AM  138 mmHg  88 mmHg  153 bpm  20 rpm  98.7 F  150 lbs  
61 in     28.34 kg/m2  1.71 m2     98 %

 

 6/3/2014  3:55:00 PM  130 mmHg  62 mmHg  69 bpm  18 rpm  97.9 F  151 lbs  61 
in     28.5309 kg/m  1.7169 m     99 %

 

 2014  10:56:00 AM  150 mmHg  74 mmHg  81 bpm  16 rpm  96.2 F  152 lbs  61 
in     28.72 kg/m2  1.72 m2     97 %

 

 2014  8:52:00 AM  138 mmHg  78 mmHg  61 bpm  20 rpm  98 F  168 lbs  61 in
     31.743 kg/m  1.811 m     100 %

 

 10/8/2013  10:08:00 AM  138 mmHg  78 mmHg  148 bpm  22 rpm  98.2 F  168 lbs  
61 in     31.74 kg/m2  1.81 m2     98 %

 

 2013  8:54:00 AM  150 mmHg  80 mmHg  78 bpm  16 rpm  98.9 F  163 lbs  61 
in     30.7983 kg/m  1.7839 m     99 %

 

 2013  10:13:00 AM  136 mmHg  78 mmHg  70 bpm  18 rpm  97.8 F  156 lbs    
              

 

 3/5/2013  9:30:00 AM  148 mmHg  80 mmHg  76 bpm  16 rpm  97.8 F  152 lbs  61 
in     28.7199 kg/m  1.7226 m      

 

 2012  10:33:00 AM  136 mmHg  74 mmHg  89 bpm  18 rpm  98.6 F  154.375 lbs
  61 in     29.17 kg/m2  1.74 m2     99 %

 

 7/3/2012  8:08:00 AM  134 mmHg  72 mmHg  78 bpm  22 rpm  98.2 F  145 lbs  61 
in     27.3972 kg/m  1.6825 m      

 

 2012  8:03:00 AM  132 mmHg  76 mmHg  70 bpm  18 rpm  97.9 F  143 lbs  61 
in     27.02 kg/m2  1.67 m2      

 

 3/26/2012  11:28:00 AM  136 mmHg  74 mmHg  89 bpm  18 rpm  97.9 F  149.125 lbs
  61 in     28.1766 kg/m  1.7062 m      

 

 2011  8:12:00 AM  132 mmHg  82 mmHg  84 bpm  16 rpm  97.8 F  150 lbs  61 
in     28.34 kg/m2  1.71 m2      

 

 2011  3:15:00 PM  130 mmHg  74 mmHg  68 bpm  18 rpm  98.3 F  156 lbs  61 
in     29.4756 kg/m  1.7451 m      

 

 8/10/2011  2:58:00 PM  120 mmHg  74 mmHg  100 bpm  20 rpm  99.4 F  169.25 lbs 
                 

 

 2011  9:49:00 AM  134 mmHg  78 mmHg  74 bpm  18 rpm  98.3 F  164 lbs     
             

 

 2011  8:15:00 AM  128 mmHg  78 mmHg  62 bpm  18 rpm  98.2 F  157 lbs     
             

 

 2011  8:54:00 AM  128 mmHg  72 mmHg  104 bpm  22 rpm  98.6 F  153 lbs     
            100 %

 

 3/15/2011  3:04:00 PM  144 mmHg  82 mmHg  72 bpm  18 rpm  98 F  163 lbs       
           

 

 2010  9:17:00 AM  124 mmHg  78 mmHg  76 bpm        145 lbs               
   

 

 2010  10:13:00 AM  130 mmHg  68 mmHg  70 bpm  16 rpm  98.3 F  144 lbs     
             

 

 2010  8:21:00 AM  134 mmHg  76 mmHg  70 bpm  20 rpm  98.6 F  147 lbs      
            







Social History







 Name  Description  Comments

 

 Tobacco  Never smoker   







History of Procedures







 Date Ordered  Description  Order Status

 

 12/3/2015 12:00 AM  LIPID PANEL  Reviewed

 

 12/3/2015 12:00 AM  GLYCOSYLATED HEMOGLOBIN TEST  Reviewed

 

 12/3/2015 12:00 AM  COMPREHEN METABOLIC PANEL  Reviewed

 

 2011 12:00 AM  METABOLIC PANEL TOTAL CA  Reviewed

 

 2011 12:00 AM  C-REACTIVE PROTEIN  Reviewed

 

 2011 12:00 AM  RBC SED RATE AUTOMATED  Reviewed

 

 2011 12:00 AM  X-RAY EXAM OF FOOT  Reviewed

 

 3/26/2012 12:00 AM  COMPLETE CBC W/AUTO DIFF WBC  Returned

 

 3/26/2012 12:00 AM  PROTHROMBIN TIME  Returned

 

 3/26/2012 12:00 AM  ASSAY OF BLOOD/URIC ACID  Returned

 

 3/27/2012 12:00 AM  RBC SED RATE AUTOMATED  Returned

 

 3/27/2012 12:00 AM  GLYCOSYLATED HEMOGLOBIN TEST  Returned

 

 2010 12:00 AM  BREATHING CAPACITY TEST  Reviewed

 

 10/8/2013 12:00 AM  CHEST X-RAY 2VW FRONTAL&LATL  Reviewed

 

 10/17/2013 12:00 AM  CHEST X-RAY 2VW FRONTAL&LATL  Reviewed

 

 2014 12:00 AM  CT HEAD/BRAIN W/O & W/DYE  Reviewed

 

 6/3/2014 12:00 AM  ECG MONIT/REPRT UP TO 48 HRS  Reviewed

 

 6/3/2014 12:00 AM  TTE W/O DOPPLER COMPLETE  Reviewed

 

 6/3/2014 12:00 AM  Carotid Doppler  Reviewed

 

 2010 12:00 AM  COMPREHEN METABOLIC PANEL  Reviewed

 

 2010 12:00 AM  LIPID PANEL  Reviewed

 

 2010 12:00 AM  GLYCOSYLATED HEMOGLOBIN TEST  Reviewed

 

 2010 12:00 AM  MICROALBUMIN SEMIQUANT  Reviewed

 

 2010 12:00 AM  DRAINAGE OF SKIN ABSCESS  Reviewed

 

 2010 12:00 AM  Urine drug screen  Reviewed

 

 2011 12:00 AM  METABOLIC PANEL TOTAL CA  Reviewed

 

 2011 12:00 AM  METABOLIC PANEL TOTAL CA  Reviewed

 

 2011 12:00 AM  C-REACTIVE PROTEIN  Reviewed

 

 8/15/2011 12:00 AM  METABOLIC PANEL TOTAL CA  Reviewed

 

 8/15/2011 12:00 AM  GLYCOSYLATED HEMOGLOBIN TEST  Reviewed







Results Summary







 Data and Description  Results

 

 2010 8:22 AM  TRIGLYCERIDES 666.0 mg/dLCHOLESTEROL 241.0 mg/dLHDL 34.0 mg/
dLLDL (CALC) INVALID MG/DLGLUCOSE 336.0 mg/dLSODIUM 138.0 mmol/LPOTASSIUM 3.40 
mmol/LCHLORIDE 102.0 mmol/LCO2 22.0 mmol/LBUN 8.0 mg/dLCREATININE 0.70 mg/dLSGOT
/AST 15.0 IU/LSGPT/ALT 7.0 IU/LALK PHOS 93.0 IU/LTOTAL PROTEIN 7.30 g/dLALBUMIN 
3.90 g/dLTOTAL BILI 0.40 mg/dLCALCIUM 9.10 mg/dLeGFR >60 mL/min/1.73 [id number] UR 
57.20 mg/dLMICROALBUMIN UR 19.0 ug/mLALB:CREAT RATIO 33 

 

 2011 9:15 AM  GLUCOSE 49.0 mg/dLSODIUM 143.0 mmol/LPOTASSIUM 3.30 mmol/
LCHLORIDE 101.0 mmol/LCO2 30.0 mmol/LBUN 12.0 mg/dLCREATININE 0.50 mg/dLCALCIUM 
9.10 mg/dLeGFR >60 mL/min/1.73 m2

 

 2011 10:25 AM  C REACTIVE PROTEIN 6.0 mg/LGLUCOSE 88.0 mg/dLSODIUM 143.0 
mmol/LPOTASSIUM 3.60 mmol/LCHLORIDE 104.0 mmol/LCO2 26.0 mmol/LBUN 11.0 mg/
dLCREATININE 0.70 mg/dLCALCIUM 9.40 mg/dLeGFR >60 mL/min/1.73 m2

 

 2011 11:00 AM  GLUCOSE 160.0 mg/dLSODIUM 141.0 mmol/LPOTASSIUM 3.70 mmol/
LCHLORIDE 105.0 mmol/LCO2 21.0 mmol/LBUN 13.0 mg/dLCREATININE 0.70 mg/dLCALCIUM 
9.50 mg/dLeGFR >60 mL/min/1.73 m2

 

 2011 3:50 PM  C REACTIVE PROTEIN 5.0 mg/LGLUCOSE 361.0 mg/dLSODIUM 140.0 
mmol/LPOTASSIUM 3.80 mmol/LCHLORIDE 102.0 mmol/LCO2 24.0 mmol/LBUN 9.0 mg/
dLCREATININE 1.0 mg/dLCALCIUM 9.60 mg/dLeGFR 57 SEDRATE 24.0 mm/hr

 

 3/26/2012 12:00 PM  WBC 7.3 RBC 4.46 HGB 13.50 g/dLHCT 39.0 %MCV 87.0 fLMCH 
30.30 pgMCHC 34.60 g/dLRDW CV 13.20 %MPV 9.60 fLPLT 142 %NEUT 59.60 %%LYMP 
33.50 %%MONO 5.60 %%EOS 1.20 %%BASO 0.10 %#NEUT 4.32 #LYMP 2.43 #MONO 0.41 #EOS 
0.09 #BASO 0.01 PROTIME 10.60 secsINR 1.0 URIC ACID 3.9 mg/dL

 

 3/27/2012 8:45 PM  SEDRATE 31.0 mm/hr

 

 2015 10:15 AM  TSH 0.390 uIU/mL

 

 12/3/2015 11:22 AM  TRIGLYCERIDES 182.0 mg/dLCHOLESTEROL 180.0 mg/dLHDL 50.0 mg
/dLLDL (CALC) 94.0 mg/dLGLUCOSE 99.0 mg/dLSODIUM 142.0 mmol/LPOTASSIUM 4.20 mmol
/LCHLORIDE 112.0 mmol/LCO2 21.0 mmol/LBUN 17.0 mg/dLCREATININE 0.70 mg/dLSGOT/
AST 17.0 IU/LSGPT/ALT 12.0 IU/LALK PHOS 86.0 IU/LTOTAL PROTEIN 6.30 g/dLALBUMIN 
4.10 g/dLTOTAL BILI 0.50 mg/dLCALCIUM 9.30 mg/dLeGFR >60 mL/min/1.73m







History Of Immunizations

Not available.



History of Past Illness







 Name  Date of Onset  Comments

 

 Diabetes Mellitus, Type II      

 

 Hyperlipidemia      

 

 Hemangioma in Spine      

 

 Bronchitis, Chronic  2010  8:23AM   

 

 Subcutaneous Nodule  2010  8:23AM   

 

 Diabetes Mellitus, Type II  2010 10:15AM   

 

 Hyperlipidemia, Mixed  2010 10:15AM   

 

 Sebaceous Cyst  2010 11:32PM   

 

 General Medical Exam, Adult  2010  9:19AM   

 

 Bursitis, right knee  Mar 15 2011  3:05PM   

 

 Gastroenteritis, Viral  May  2 2011  8:56AM   

 

 Hypokalemia  May 11 2011  8:15AM   

 

 Polymyalgia Rheumatica  May 11 2011  8:15AM   

 

 Hypokalemia  2011  9:48AM   

 

 Polymyalgia Rheumatica  2011  9:48AM   

 

 Diabetes Mellitus, Type II  2011  9:48AM   

 

 Diabetes Mellitus, Type II  Aug 10 2011  2:57PM   

 

 Edema bilateral lower extremities  Aug 10 2011  2:57PM   

 

 Polymyalgia Rheumatica  Dec 14 2011  3:17PM   

 

 Arthralgia  Dec 14 2011  3:17PM   

 

 Pain in  foot, Left Heel  Dec 27 2011  8:16AM   

 

 Edema left lower ext.  Mar 26 2012 11:30AM   

 

 Petechial Rash  Mar 26 2012 11:30AM   

 

 Diabetes Mellitus, Type II  Mar 27 2012  1:39PM   

 

 Polymyalgia Rheumatica  Mar 27 2012  1:39PM   

 

 Edema left lower ext.  2012  8:05AM   

 

 Diabetes Mellitus, Type II  2012  8:05AM   

 

 Cellulitis left foot  2012  8:05AM   

 

 Myalgia  Jul  3 2012  8:10AM   

 

 Headache  Jul  3 2012  8:10AM   

 

 Neuropathy, right foot  Jul  3 2012  8:10AM   

 

 Upper Respiratory Infections  Dec  4 2012 10:35AM   

 

 Diabetes Mellitus, Type II  Mar  5 2013  9:32AM   

 

 Hyperlipidemia  Mar  5 2013  9:32AM   

 

 Atrial Fibrillation  Mar  5 2013  9:32AM   

 

 Osteoarthritis, hand  Mar  5 2013  9:32AM   

 

 Cellulitis  2013 10:15AM   

 

 Right Acute Otitis Media  Aug 28 2013  8:55AM   

 

 Chronic Cough  Oct  8 2013 10:10AM   

 

 Shortness of breath  Oct  8 2013 10:10AM   

 

 Cough  Oct 17 2013  9:40AM   

 

 Tension Headache  2014  8:54AM   

 

 Blurred Vision  2014  8:54AM   

 

 Bronchitis, Acute  2014  8:54AM   

 

 Postoperative Follow-up  2014 10:56AM   

 

 Basal Cell  2014 10:56AM   

 

 Basal Cell Carcinoma of Skin  2014 10:59AM   

 

 Sebaceous Cyst  2014 10:59AM   

 

 Syncope  Oc  3 2014  3:57PM   

 

 Basal cell carcinoma of skin, site unspecified  2014  7:25AM   

 

 Lumbar back pain  Dec 19 2014  9:42AM   

 

 Left Radiculopathy of leg  Dec 19 2014  9:42AM   

 

 Depressive Disorder  Dec 19 2014  9:42AM   

 

 Diabetes Mellitus, Type II  May 13 2015 10:33AM   

 

 Hyperlipidemia  May 13 2015 10:33AM   

 

 Systolic CHF, acute  May 13 2015 10:33AM   

 

 Resolved Atrial fibrillation  May 13 2015 10:33AM   

 

 Mixed hyperlipidemia  Dec  3 2015 10:19AM   

 

 Diabetes mellitus type 2, uncontrolled  Dec  3 2015 10:19AM   







Payers







 Insurance Name  Company Name  Plan Name  Plan Number  Policy Number  Policy 
Group Number  Start Date

 

    [employer] [place]     [id number]     2015

 

    [employer] [place]     [id number]     

 

    [facility]  [employer] 
PHYS/DS  [id number]     N/A







History of Encounters







 Visit Date  Visit Type  Provider

 

 12/3/2015  Office visit  Mateo Cooper DO

 

 5/15/2015  [facility]  LEXX Holm MD

 

 2015  Office visit  Mateo Cooper DO

 

 2014  Office visit  Mateo Cooper DO

 

 2014  Hospital  LEXX Holm MD

 

 2014  Procedures  David Suresh MD

 

 6/3/2014  Office visit  Mateo Cooper DO

 

 2014  Office visit  David Suresh MD

 

 2014  Procedures  aDvid Suresh MD

 

 2014  [facility]  LEXX Holm MD

 

 2014  Office visit  Mateo Cooper DO

 

 10/8/2013  Office visit  Mateo Cooper DO

 

 2013  Office visit  Mateo Cooper DO

 

 2013  Office visit  Mateo Cooper DO

 

 3/5/2013  Office visit  Mateo Cooper DO

 

 2013  [facility]  LEXX Holm MD

 

 2012  Office visit  Jenni Katz APRN

 

 2012  [facility]  LEXX Holm MD

 

 7/3/2012  Office visit  Mateo Cooper DO

 

 2012  [facility]  Kristian Kiser MD

 

 2012  [facility]  LEXX Holm MD

 

 2012  [facility]  Kristian Kiser MD

 

 2012  [facility]  LEXX Holm MD

 

 2012  Office visit  Mateo Cooper DO

 

 3/26/2012  Office visit  Jenni Katz APRN

 

 2011  Office visit  Mateo Cooper DO

 

 2011  Office visit  Jenni Katz APRN

 

 8/10/2011  Office visit  Jenni Katz APRN

 

 2011  Office visit  Mateo Cooper DO

 

 2011  Office visit  Mateo Cooper DO

 

 2011  [facility]  Bandar Zelaya MD

 

 2011  [facility]  Bandar Zelaya MD

 

 2011  [facility]  Bandar Zelaya MD

 

 2011  [facility]  LEXX Holm MD

 

 2011  Office visit  Mateo Cooper DO

 

 3/15/2011  Office visit  Mateo Cooper DO

 

 2010  Office visit  David Burciaga PA-C

 

 2010  Office visit  Mateo Cooper DO

 

 2010  Laboratory  Ayush Finn MD

 

 2010  Laboratory  Ayush Finn MD

 

 2010  Office visit  Mateo Cooper DO No indicators present

## 2023-11-17 NOTE — ED ADULT TRIAGE NOTE - BMI (KG/M2)
nebulization every 6 hours as needed for Wheezing      sulfamethoxazole-trimethoprim (BACTRIM DS) 800-160 MG per tablet Use BID for 7 days as needed for infection. (Patient not taking: Reported on 11/17/2023) 40 tablet 0    ondansetron (ZOFRAN) 4 MG tablet Take 1 tablet by mouth 3 times daily as needed for Nausea or Vomiting (Patient not taking: Reported on 11/17/2023) 15 tablet 0     No current facility-administered medications for this visit. Allergies   Allergen Reactions    Hydrocodone-Acetaminophen Other (See Comments)     Other reaction(s): Drowsiness  dizzyness     Social History     Socioeconomic History    Marital status:      Spouse name: Not on file    Number of children: Not on file    Years of education: Not on file    Highest education level: Not on file   Occupational History    Not on file   Tobacco Use    Smoking status: Never    Smokeless tobacco: Never   Substance and Sexual Activity    Alcohol use: Never    Drug use: Never    Sexual activity: Not on file   Other Topics Concern    Not on file   Social History Narrative    Not on file     Social Determinants of Health     Financial Resource Strain: Not on file   Food Insecurity: Not on file   Transportation Needs: Not on file   Physical Activity: Not on file   Stress: Not on file   Social Connections: Not on file   Intimate Partner Violence: Not on file   Housing Stability: Not on file     Family History   Problem Relation Age of Onset    Heart Disease Father     Diabetes Father     Cancer Sister     Thyroid Disease Sister        Review of Systems  Constitutional:   Negative for fever. GI:  Negative for nausea. Genitourinary: Positive for recurrent UTIs. Musculoskeletal:  Negative for back pain.       Urinalysis  UA - Dipstick  Results for orders placed or performed in visit on 11/17/23   AMB POC URINALYSIS DIP STICK AUTO W/O MICRO   Result Value Ref Range    Color (UA POC)      Clarity (UA POC)      Glucose, Urine, POC Negative
29.8

## 2023-11-20 ENCOUNTER — EMERGENCY (EMERGENCY)
Facility: HOSPITAL | Age: 33
LOS: 1 days | Discharge: HOME | End: 2023-11-20
Attending: EMERGENCY MEDICINE | Admitting: EMERGENCY MEDICINE
Payer: MEDICAID

## 2023-11-20 VITALS
RESPIRATION RATE: 18 BRPM | SYSTOLIC BLOOD PRESSURE: 138 MMHG | TEMPERATURE: 98 F | DIASTOLIC BLOOD PRESSURE: 95 MMHG | HEART RATE: 72 BPM | OXYGEN SATURATION: 100 % | HEIGHT: 72 IN

## 2023-11-20 DIAGNOSIS — Z98.890 OTHER SPECIFIED POSTPROCEDURAL STATES: Chronic | ICD-10-CM

## 2023-11-20 PROCEDURE — 99285 EMERGENCY DEPT VISIT HI MDM: CPT

## 2023-11-20 RX ORDER — ACETAMINOPHEN 500 MG
975 TABLET ORAL ONCE
Refills: 0 | Status: COMPLETED | OUTPATIENT
Start: 2023-11-20 | End: 2023-11-20

## 2023-11-20 RX ORDER — OXYCODONE HYDROCHLORIDE 5 MG/1
5 TABLET ORAL ONCE
Refills: 0 | Status: DISCONTINUED | OUTPATIENT
Start: 2023-11-20 | End: 2023-11-20

## 2023-11-20 NOTE — ED PROVIDER NOTE - OBJECTIVE STATEMENT
Patient Education       Well Child Exam 9 Months   About this topic   Your baby's 9-month well child exam is a visit with the doctor to check your baby's health. The doctor measures your baby's weight, height, and head size. The doctor plots these numbers on a growth curve. The growth curve gives a picture of your baby's growth at each visit. The doctor may listen to your baby's heart, lungs, and belly. Your doctor will do a full exam of your baby from the head to the toes.  Your baby may also need shots or blood tests during this visit.  General   Growth and Development   Your doctor will ask you how your baby is developing. The doctor will focus on the skills that most children your baby's age are expected to do. During this time of your baby's life, here are some things you can expect.  Movement - Your baby may:  Begin to crawl without help  Start to pull up and stand  Start to wave  Sit without support  Use finger and thumb to  small objects  Move objects smoothy between hands  Start putting objects in their mouth  Hearing, seeing, and talking - Your baby will likely:  Respond to name  Say things like Mama or Jac, but not specific to the parent  Enjoy playing peek-a-doherty  Will use fingers to point at things  Copy your sounds and gestures  Begin to understand no. Try to distract or redirect to correct your baby.  Be more comfortable with familiar people and toys. Be prepared for tears when saying good bye. Say I love you and then leave. Your baby may be upset, but will calm down in a little bit.  Feeding - Your baby:  Still takes breast milk or formula for some nutrition. Always hold your baby when feeding. Do not prop a bottle. Propping the bottle makes it easier for your baby to choke and get ear infections.  Is likely ready to start drinking water from a cup. Limit water to no more than 8 ounces per day. Healthy babies do not need extra water. Breastmilk and formula provide all of the fluids they  need.  Will be eating cereal and other baby foods for 3 meals and 2 to 3 snacks a day  May be ready to start eating table foods that are soft, mashed, or pureed.  Dont force your baby to eat foods. You may have to offer a food more than 10 times before your baby will like it.  Give your baby very small bites of soft finger foods like bananas or well cooked vegetables.  Watch for signs your baby is full, like turning the head or leaning back.  Avoid foods that can cause choking, such as whole grapes, popcorn, nuts or hot dogs.  Should be allowed to try to eat without help. Mealtime will be messy.  Should not have fruit juice.  May have new teeth. If so, brush them 2 times each day with a smear of toothpaste. Use a cold clean wash cloth or teething ring to help ease sore gums.  Sleep - Your baby:  Should still sleep in a safe crib, on the back, alone for naps and at night. Keep soft bedding, bumpers, and toys out of your baby's bed. It is OK if your baby rolls over without help at night.  Is likely sleeping about 9 to 10 hours in a row at night  Needs 1 to 2 naps each day  Sleeps about a total of 14 hours each day  Should be able to fall asleep without help. If your baby wakes up at night, check on your baby. Do not pick your baby up, offer a bottle, or play with your baby. Doing these things will not help your baby fall asleep without help.  Should not have a bottle in bed. This can cause tooth decay or ear infections. Give a bottle before putting your baby in the crib for the night.  Shots or vaccines - It is important for your baby to get shots on time. This protects from very serious illnesses like lung infections, meningitis, or infections that damage their nervous system. Your baby may need to get shots if it is flu season or if they were missed earlier. Check with your doctor to make sure your baby's shots are up to date. This is one of the most important things you can do to keep your baby healthy.  Help for  33-year-old male past medical history of Guillain [place] syndrome in 2017, HIV (from birth, unsure of CD4/viral count, on ART–Biktarvy,) cocaine abuse, asthma (never intubated) Parents   Play with your baby.  Give your baby soft balls, blocks, and containers to play with. Toys that make noise are also good.  Read to your baby. Name the things in the pictures in the book. Talk and sing to your baby. Use real language, not baby talk. This helps your baby learn language skills.  Sing songs with hand motions like pat-a-cake or active nursery rhymes.  Hide a toy partly under a blanket for your baby to find.  Here are some things you can do to help keep your baby safe and healthy.  Do not allow anyone to smoke in your home or around your baby. Second hand smoke can harm your baby.  Have the right size car seat for your baby and use it every time your baby is in the car. Your baby should be rear facing until at least 2 years of age or older.  Pad corners and sharp edges. Put a gate at the top and bottom of the stairs. Be sure furniture, shelves, and televisions are secure and cannot tip onto your baby.  Take extra care if your baby is in the kitchen.  Make sure you use the back burners on the stove and turn pot handles so your baby cannot grab them.  Keep hot items like liquids, coffee pots, and heaters away from your baby.  Put childproof locks on cabinets, especially those that contain cleaning supplies or other things that may harm your baby.  Never leave your baby alone. Do not leave your baby in the car, in the bath, or at home alone, even for a few minutes.  Avoid screen time for children under 2 years old. This means no TV, computers, or video games. They can cause problems with brain development.  Parents need to think about:  Coping with mealtime messes  How to distract your baby when doing something you dont want your baby to do  Using positive words to tell your baby what you want, rather than saying no or what not to do  How to childproof your home and yard to keep from having to say no to your baby as much  Your next well child visit will most likely be when your baby is 12 months  old. At this visit your doctor may:  Do a full check up on your baby  Talk about making sure your home is safe for your baby, if your baby becomes upset when you leave, and how to correct your baby  Give your baby the next set of shots     When do I need to call the doctor?   Fever of 100.4°F (38°C) or higher  Sleeps all the time or has trouble sleeping  Won't stop crying  You are worried about your baby's development  Where can I learn more?   American Academy of Pediatrics  https://www.healthychildren.org/English/ages-stages/baby/feeding-nutrition/Pages/Switching-To-Solid-Foods.aspx   Centers for Disease Control and Prevention  https://www.cdc.gov/ncbddd/actearly/milestones/milestones-9mo.html   Kids Health  https://kidshealth.org/en/parents/checkup-9mos.html?ref=search   Last Reviewed Date   2021  Consumer Information Use and Disclaimer   This information is not specific medical advice and does not replace information you receive from your health care provider. This is only a brief summary of general information. It does NOT include all information about conditions, illnesses, injuries, tests, procedures, treatments, therapies, discharge instructions or life-style choices that may apply to you. You must talk with your health care provider for complete information about your health and treatment options. This information should not be used to decide whether or not to accept your health care providers advice, instructions or recommendations. Only your health care provider has the knowledge and training to provide advice that is right for you.  Copyright   Copyright © 2021 UpToDate, Inc. and its affiliates and/or licensors. All rights reserved.    Children under the age of 2 years will be restrained in a rear facing child safety seat.   If you have an active [facility] account, please look for your well child questionnaire to come to your [facility] account before your next well child visit.   33-year-old male past medical history of Guillain [place] syndrome in 2017, HIV (from birth, unsure of CD4/viral count, on ART–Biktarvy,) cocaine abuse, asthma (never intubated) Here for fall from standing approximately 4 hours prior to arrival.  As per patient was walking to cross the street tripped on the curb fell forward on both knees and then back and hit head on curb.  Patient was not able to ambulate after event called ambulance and came for further evaluation.  Endorsing headache and difficulty moving neck, pain in both knees and in left lower extremity.  Denies any loss of consciousness, not on blood thinners.  States that he has had chronic weakness upon further perusal of chart patient was admitted for rehab but did not end up going approximately 1 month ago.  States that he was removed from hospital by his family.  Last tetanus shot 3 years ago.

## 2023-11-20 NOTE — ED PROVIDER NOTE - PROGRESS NOTE DETAILS
BENSON: pain improved. CT read by rads, no acute findings. XR of MSK system without any gross/acute Fx/dislocations. This was explained to pt and he is aware. Will discharge. pt requesting SW assistance for shelter.

## 2023-11-20 NOTE — ED ADULT NURSE NOTE - OBJECTIVE STATEMENT
received patient in room 1A. patient alert and oriented times three Pt c/o fall. States he tripped and fell, hitting head today at 8pm. C/o L leg pain, back pain and head pain. No blood thinner use, no LOC. Denies nausea/vomiting, dizziness, blurry vision. PMHx Guillain [place] syndrome in 2017, HIV, cocaine abuse, asthma.   Patient has a patent airway, breath sounds equal and bi-lateral. Chest rise equal. head, eyes, ears, nose, throat clear. Pupils equal and reactive. Patient has positive pulse motor sensory to all four extremities. Patients abdomen non-distended to all four quadrant. No deformities or abrasions found upon examination. Patient stable upon leaving the patient in their hallway location. Safety measure in place. No IV access at this time.

## 2023-11-20 NOTE — ED PROVIDER NOTE - NS ED ROS FT
Gen: No fever, normal appetite  Eyes: No eye irritation or discharge  ENT: No ear pain, congestion, sore throat  Resp: No cough or trouble breathing  Cardiovascular: No chest pain or palpitation  Gastroenteric: No nausea/vomiting, diarrhea, constipation  :  No change in urine output; no dysuria  MS: (+) pelvic pain, head and neck pain, b/l knee pain, L shin pain  Skin: No rashes  Neuro: No headache; no abnormal movements  Remainder negative, except as per the HPI

## 2023-11-20 NOTE — ED PROVIDER NOTE - CLINICAL SUMMARY MEDICAL DECISION MAKING FREE TEXT BOX
33M PMH as above here for fall from standing, mechanical in nature. VSS, PE as above. Nexus criteria (+) for neck, will scan head and neck as well as XR of hips, knees and L tib/fib. Low index of suspicion for acute fracture or intracranial pathology, however given extent of pain will do CTs. Pain control with tylenol and oxycodone. Dispo pending clinical course, likely dc.

## 2023-11-20 NOTE — ED PROVIDER NOTE - PHYSICAL EXAMINATION
Gen: WDWN, NAD  HEENT: EOMI, no nasal discharge, mucous membranes moist  CV: RRR, +S1/S2, no M/R/G  Resp: CTAB, no W/R/R  GI: Abdomen soft non-distended, NTTP, no masses  MSK: No open wounds, no bruising, no LE edema  Neuro: A&Ox4, following commands, moving all four extremities spontaneously  Psych: appropriate mood, denies AH, VH, SI Gen: WDWN, NAD  HEENT: EOMI, no nasal discharge, mucous membranes moist  CV: RRR, +S1/S2, no M/R/G  Resp: CTAB, no W/R/R  GI: Abdomen soft non-distended, NTTP, no masses  MSK: No open wounds, no bruising, no LE edema  Neuro: A&Ox4, following commands, moving all four extremities spontaneously, TTP in the midline of the neck, patient ranging and flexing neck prior to evaluation although complaining of difficulty with the same upon asking, hips and LLE primarily knee and shin, no bruising or punctate wound appreciated   Psych: appropriate mood, denies AH, VH, SI

## 2023-11-20 NOTE — ED PROVIDER NOTE - NSICDXPASTMEDICALHX_GEN_ALL_CORE_FT
PAST MEDICAL HISTORY:  Asthma     Chronic sinusitis     Closed fracture of multiple ribs of right side, initial encounter     Cocaine abuse     GBS (Guillain [place] syndrome)     HIV (human immunodeficiency virus infection) from birth    HIV disease     Homeless

## 2023-11-20 NOTE — ED PROVIDER NOTE - NSFOLLOWUPINSTRUCTIONS_ED_ALL_ED_FT
You were seen in the ED for FALL    We obtained XRAYS of your ankles, pelvis, hips, and knees and lower leg and did not see any broken bones.     We also obtained CT imaging of your head and neck and did not find any broken bones and or bleeding in your brain.     It was determined that you have no life threatening injuries or condition that requires you to be admitted to the hospital or have any additional testing while in the ED.     Attached are your results from todays visit.     Please take these results to follow up with your primary care doctor so that they can determine if you need any additional testing or treatment as an outpatient.     If your symptoms worsen or change, you can return to the ED for further evaluation and care at that time.

## 2023-11-20 NOTE — ED ADULT NURSE NOTE - NSFALLHARMRISKINTERV_ED_ALL_ED

## 2023-11-20 NOTE — ED PROVIDER NOTE - ATTENDING CONTRIBUTION TO CARE
HPI: 33-year-old male past medical history of Guillain [place] syndrome in 2017, HIV (from birth, unsure of CD4/viral count, on ART–Biktarvy,) cocaine abuse, asthma (never intubated) Here for fall from standing approximately 4 hours prior to arrival.  As per patient was walking to cross the street tripped on the curb fell forward on both knees and then back and hit head on curb.  Patient was not able to ambulate after event called ambulance and came for further evaluation.  Endorsing headache and difficulty moving neck, pain in both knees and in left lower extremity.  Denies any loss of consciousness, not on blood thinners.  States that he has had chronic weakness upon further perusal of chart patient was admitted for rehab but did not end up going approximately 1 month ago.  States that he was removed from hospital by his family.  Last tetanus shot 3 years ago.    EXAM: Well appearing, NAD, not Altered, head atraumatic, eyes EOMI/PERRL, neck supple, FROM at all 4 ext, no gross MSK deformities, normal neuro exam, pulses are intact and equal x 4.     MDM: pt with multiple medical problems that had mech slip and fall while walking on street, hitting head and fell forward on both knees and hit head on back. No abnormalities on exam but will obtain imaging and provide pain meds and reassess.

## 2023-11-20 NOTE — ED ADULT NURSE NOTE - CHIEF COMPLAINT QUOTE
Pt c/o fall. States he tripped and fell, hitting head. C/o L leg pain, back pain and head pain. No blood thinner use, no LOC. Denies nausea/vomiting, dizziness, blurry vision. PMHx Guillain [place] syndrome in 2017, HIV, cocaine abuse, asthma
Discharged

## 2023-11-20 NOTE — ED ADULT NURSE NOTE - NSICDXPASTMEDICALHX_GEN_ALL_CORE_FT
PAST MEDICAL HISTORY:  Asthma     Chronic sinusitis     Closed fracture of multiple ribs of right side, initial encounter     Cocaine abuse     GBS (Guillain [place] syndrome)     HIV (human immunodeficiency virus infection) from birth    HIV disease     Homeless

## 2023-11-20 NOTE — ED ADULT TRIAGE NOTE - CHIEF COMPLAINT QUOTE
Pt c/o fall. States he tripped and fell, hitting head. C/o L leg pain, back pain and head pain. No blood thinner use, no LOC. Denies nausea/vomiting, dizziness, blurry vision. PMHx Guillain [place] syndrome in 2017, HIV, cocaine abuse, asthma

## 2023-11-20 NOTE — ED PROVIDER NOTE - PATIENT PORTAL LINK FT
You can access the FollowMyHealth Patient Portal offered by [facility] by registering at the following website: [email or web address]. By joining [facility]’s FollowMyHealth portal, you will also be able to view your health information using other applications (apps) compatible with our system.

## 2023-11-21 ENCOUNTER — EMERGENCY (EMERGENCY)
Facility: HOSPITAL | Age: 33
LOS: 1 days | Discharge: HOME | End: 2023-11-21
Attending: STUDENT IN AN ORGANIZED HEALTH CARE EDUCATION/TRAINING PROGRAM
Payer: MEDICAID

## 2023-11-21 VITALS
DIASTOLIC BLOOD PRESSURE: 90 MMHG | SYSTOLIC BLOOD PRESSURE: 146 MMHG | TEMPERATURE: 98 F | RESPIRATION RATE: 18 BRPM | OXYGEN SATURATION: 100 %

## 2023-11-21 VITALS
HEART RATE: 67 BPM | SYSTOLIC BLOOD PRESSURE: 137 MMHG | RESPIRATION RATE: 18 BRPM | OXYGEN SATURATION: 100 % | TEMPERATURE: 98 F | DIASTOLIC BLOOD PRESSURE: 95 MMHG | HEIGHT: 72 IN | WEIGHT: 184.97 LBS

## 2023-11-21 DIAGNOSIS — Z88.1 ALLERGY STATUS TO OTHER ANTIBIOTIC AGENTS STATUS: ICD-10-CM

## 2023-11-21 DIAGNOSIS — R42 DIZZINESS AND GIDDINESS: ICD-10-CM

## 2023-11-21 DIAGNOSIS — R53.1 WEAKNESS: ICD-10-CM

## 2023-11-21 DIAGNOSIS — Z59.00 HOMELESSNESS UNSPECIFIED: ICD-10-CM

## 2023-11-21 DIAGNOSIS — F14.10 COCAINE ABUSE, UNCOMPLICATED: ICD-10-CM

## 2023-11-21 DIAGNOSIS — Z88.6 ALLERGY STATUS TO ANALGESIC AGENT: ICD-10-CM

## 2023-11-21 DIAGNOSIS — Z98.890 OTHER SPECIFIED POSTPROCEDURAL STATES: Chronic | ICD-10-CM

## 2023-11-21 DIAGNOSIS — J45.909 UNSPECIFIED ASTHMA, UNCOMPLICATED: ICD-10-CM

## 2023-11-21 DIAGNOSIS — Z86.69 PERSONAL HISTORY OF OTHER DISEASES OF THE NERVOUS SYSTEM AND SENSE ORGANS: ICD-10-CM

## 2023-11-21 DIAGNOSIS — B20 HUMAN IMMUNODEFICIENCY VIRUS [HIV] DISEASE: ICD-10-CM

## 2023-11-21 LAB
ALBUMIN SERPL ELPH-MCNC: 3.9 G/DL — SIGNIFICANT CHANGE UP (ref 3.3–5)
ALBUMIN SERPL ELPH-MCNC: 3.9 G/DL — SIGNIFICANT CHANGE UP (ref 3.3–5)
ALP SERPL-CCNC: 66 U/L — SIGNIFICANT CHANGE UP (ref 40–120)
ALP SERPL-CCNC: 66 U/L — SIGNIFICANT CHANGE UP (ref 40–120)
ALT FLD-CCNC: 24 U/L — SIGNIFICANT CHANGE UP (ref 12–78)
ALT FLD-CCNC: 24 U/L — SIGNIFICANT CHANGE UP (ref 12–78)
ANION GAP SERPL CALC-SCNC: 17 MMOL/L — HIGH (ref 7–14)
ANION GAP SERPL CALC-SCNC: 17 MMOL/L — HIGH (ref 7–14)
ANION GAP SERPL CALC-SCNC: 6 MMOL/L — SIGNIFICANT CHANGE UP (ref 5–17)
ANION GAP SERPL CALC-SCNC: 6 MMOL/L — SIGNIFICANT CHANGE UP (ref 5–17)
AST SERPL-CCNC: 24 U/L — SIGNIFICANT CHANGE UP (ref 15–37)
AST SERPL-CCNC: 24 U/L — SIGNIFICANT CHANGE UP (ref 15–37)
BASOPHILS # BLD AUTO: 0.02 K/UL — SIGNIFICANT CHANGE UP (ref 0–0.2)
BASOPHILS NFR BLD AUTO: 0.3 % — SIGNIFICANT CHANGE UP (ref 0–2)
BASOPHILS NFR BLD AUTO: 0.3 % — SIGNIFICANT CHANGE UP (ref 0–2)
BASOPHILS NFR BLD AUTO: 0.4 % — SIGNIFICANT CHANGE UP (ref 0–2)
BASOPHILS NFR BLD AUTO: 0.4 % — SIGNIFICANT CHANGE UP (ref 0–2)
BILIRUB SERPL-MCNC: 0.4 MG/DL — SIGNIFICANT CHANGE UP (ref 0.2–1.2)
BILIRUB SERPL-MCNC: 0.4 MG/DL — SIGNIFICANT CHANGE UP (ref 0.2–1.2)
BUN SERPL-MCNC: 13 MG/DL — SIGNIFICANT CHANGE UP (ref 7–23)
BUN SERPL-MCNC: 13 MG/DL — SIGNIFICANT CHANGE UP (ref 7–23)
BUN SERPL-MCNC: 14 MG/DL — SIGNIFICANT CHANGE UP (ref 7–23)
BUN SERPL-MCNC: 14 MG/DL — SIGNIFICANT CHANGE UP (ref 7–23)
CALCIUM SERPL-MCNC: 9 MG/DL — SIGNIFICANT CHANGE UP (ref 8.5–10.1)
CALCIUM SERPL-MCNC: 9 MG/DL — SIGNIFICANT CHANGE UP (ref 8.5–10.1)
CALCIUM SERPL-MCNC: 9.5 MG/DL — SIGNIFICANT CHANGE UP (ref 8.4–10.5)
CALCIUM SERPL-MCNC: 9.5 MG/DL — SIGNIFICANT CHANGE UP (ref 8.4–10.5)
CHLORIDE SERPL-SCNC: 100 MMOL/L — SIGNIFICANT CHANGE UP (ref 98–107)
CHLORIDE SERPL-SCNC: 100 MMOL/L — SIGNIFICANT CHANGE UP (ref 98–107)
CHLORIDE SERPL-SCNC: 108 MMOL/L — SIGNIFICANT CHANGE UP (ref 96–108)
CHLORIDE SERPL-SCNC: 108 MMOL/L — SIGNIFICANT CHANGE UP (ref 96–108)
CO2 SERPL-SCNC: 23 MMOL/L — SIGNIFICANT CHANGE UP (ref 22–31)
CO2 SERPL-SCNC: 23 MMOL/L — SIGNIFICANT CHANGE UP (ref 22–31)
CO2 SERPL-SCNC: 27 MMOL/L — SIGNIFICANT CHANGE UP (ref 22–31)
CO2 SERPL-SCNC: 27 MMOL/L — SIGNIFICANT CHANGE UP (ref 22–31)
CREAT SERPL-MCNC: 0.89 MG/DL — SIGNIFICANT CHANGE UP (ref 0.5–1.3)
CREAT SERPL-MCNC: 0.89 MG/DL — SIGNIFICANT CHANGE UP (ref 0.5–1.3)
CREAT SERPL-MCNC: 1.02 MG/DL — SIGNIFICANT CHANGE UP (ref 0.5–1.3)
CREAT SERPL-MCNC: 1.02 MG/DL — SIGNIFICANT CHANGE UP (ref 0.5–1.3)
EGFR: 115 ML/MIN/1.73M2 — SIGNIFICANT CHANGE UP
EGFR: 115 ML/MIN/1.73M2 — SIGNIFICANT CHANGE UP
EGFR: 99 ML/MIN/1.73M2 — SIGNIFICANT CHANGE UP
EGFR: 99 ML/MIN/1.73M2 — SIGNIFICANT CHANGE UP
EOSINOPHIL # BLD AUTO: 0 K/UL — SIGNIFICANT CHANGE UP (ref 0–0.5)
EOSINOPHIL # BLD AUTO: 0 K/UL — SIGNIFICANT CHANGE UP (ref 0–0.5)
EOSINOPHIL # BLD AUTO: 0.01 K/UL — SIGNIFICANT CHANGE UP (ref 0–0.5)
EOSINOPHIL # BLD AUTO: 0.01 K/UL — SIGNIFICANT CHANGE UP (ref 0–0.5)
EOSINOPHIL NFR BLD AUTO: 0 % — SIGNIFICANT CHANGE UP (ref 0–6)
EOSINOPHIL NFR BLD AUTO: 0 % — SIGNIFICANT CHANGE UP (ref 0–6)
EOSINOPHIL NFR BLD AUTO: 0.2 % — SIGNIFICANT CHANGE UP (ref 0–6)
EOSINOPHIL NFR BLD AUTO: 0.2 % — SIGNIFICANT CHANGE UP (ref 0–6)
GLUCOSE SERPL-MCNC: 101 MG/DL — HIGH (ref 70–99)
GLUCOSE SERPL-MCNC: 101 MG/DL — HIGH (ref 70–99)
GLUCOSE SERPL-MCNC: 92 MG/DL — SIGNIFICANT CHANGE UP (ref 70–99)
GLUCOSE SERPL-MCNC: 92 MG/DL — SIGNIFICANT CHANGE UP (ref 70–99)
HCT VFR BLD CALC: 41.1 % — SIGNIFICANT CHANGE UP (ref 39–50)
HCT VFR BLD CALC: 41.1 % — SIGNIFICANT CHANGE UP (ref 39–50)
HCT VFR BLD CALC: 42.5 % — SIGNIFICANT CHANGE UP (ref 39–50)
HCT VFR BLD CALC: 42.5 % — SIGNIFICANT CHANGE UP (ref 39–50)
HGB BLD-MCNC: 13.7 G/DL — SIGNIFICANT CHANGE UP (ref 13–17)
IANC: 3.92 K/UL — SIGNIFICANT CHANGE UP (ref 1.8–7.4)
IANC: 3.92 K/UL — SIGNIFICANT CHANGE UP (ref 1.8–7.4)
IMM GRANULOCYTES NFR BLD AUTO: 0 % — SIGNIFICANT CHANGE UP (ref 0–0.9)
IMM GRANULOCYTES NFR BLD AUTO: 0 % — SIGNIFICANT CHANGE UP (ref 0–0.9)
IMM GRANULOCYTES NFR BLD AUTO: 0.3 % — SIGNIFICANT CHANGE UP (ref 0–0.9)
IMM GRANULOCYTES NFR BLD AUTO: 0.3 % — SIGNIFICANT CHANGE UP (ref 0–0.9)
LYMPHOCYTES # BLD AUTO: 2.88 K/UL — SIGNIFICANT CHANGE UP (ref 1–3.3)
LYMPHOCYTES # BLD AUTO: 2.88 K/UL — SIGNIFICANT CHANGE UP (ref 1–3.3)
LYMPHOCYTES # BLD AUTO: 3.07 K/UL — SIGNIFICANT CHANGE UP (ref 1–3.3)
LYMPHOCYTES # BLD AUTO: 3.07 K/UL — SIGNIFICANT CHANGE UP (ref 1–3.3)
LYMPHOCYTES # BLD AUTO: 38.5 % — SIGNIFICANT CHANGE UP (ref 13–44)
LYMPHOCYTES # BLD AUTO: 38.5 % — SIGNIFICANT CHANGE UP (ref 13–44)
LYMPHOCYTES # BLD AUTO: 57.6 % — HIGH (ref 13–44)
LYMPHOCYTES # BLD AUTO: 57.6 % — HIGH (ref 13–44)
MCHC RBC-ENTMCNC: 29.3 PG — SIGNIFICANT CHANGE UP (ref 27–34)
MCHC RBC-ENTMCNC: 29.3 PG — SIGNIFICANT CHANGE UP (ref 27–34)
MCHC RBC-ENTMCNC: 29.7 PG — SIGNIFICANT CHANGE UP (ref 27–34)
MCHC RBC-ENTMCNC: 29.7 PG — SIGNIFICANT CHANGE UP (ref 27–34)
MCHC RBC-ENTMCNC: 32.2 GM/DL — SIGNIFICANT CHANGE UP (ref 32–36)
MCHC RBC-ENTMCNC: 32.2 GM/DL — SIGNIFICANT CHANGE UP (ref 32–36)
MCHC RBC-ENTMCNC: 33.3 GM/DL — SIGNIFICANT CHANGE UP (ref 32–36)
MCHC RBC-ENTMCNC: 33.3 GM/DL — SIGNIFICANT CHANGE UP (ref 32–36)
MCV RBC AUTO: 89 FL — SIGNIFICANT CHANGE UP (ref 80–100)
MCV RBC AUTO: 89 FL — SIGNIFICANT CHANGE UP (ref 80–100)
MCV RBC AUTO: 90.8 FL — SIGNIFICANT CHANGE UP (ref 80–100)
MCV RBC AUTO: 90.8 FL — SIGNIFICANT CHANGE UP (ref 80–100)
MONOCYTES # BLD AUTO: 0.46 K/UL — SIGNIFICANT CHANGE UP (ref 0–0.9)
MONOCYTES # BLD AUTO: 0.46 K/UL — SIGNIFICANT CHANGE UP (ref 0–0.9)
MONOCYTES # BLD AUTO: 0.94 K/UL — HIGH (ref 0–0.9)
MONOCYTES # BLD AUTO: 0.94 K/UL — HIGH (ref 0–0.9)
MONOCYTES NFR BLD AUTO: 11.8 % — SIGNIFICANT CHANGE UP (ref 2–14)
MONOCYTES NFR BLD AUTO: 11.8 % — SIGNIFICANT CHANGE UP (ref 2–14)
MONOCYTES NFR BLD AUTO: 9.2 % — SIGNIFICANT CHANGE UP (ref 2–14)
MONOCYTES NFR BLD AUTO: 9.2 % — SIGNIFICANT CHANGE UP (ref 2–14)
NEUTROPHILS # BLD AUTO: 1.63 K/UL — LOW (ref 1.8–7.4)
NEUTROPHILS # BLD AUTO: 1.63 K/UL — LOW (ref 1.8–7.4)
NEUTROPHILS # BLD AUTO: 3.92 K/UL — SIGNIFICANT CHANGE UP (ref 1.8–7.4)
NEUTROPHILS # BLD AUTO: 3.92 K/UL — SIGNIFICANT CHANGE UP (ref 1.8–7.4)
NEUTROPHILS NFR BLD AUTO: 32.6 % — LOW (ref 43–77)
NEUTROPHILS NFR BLD AUTO: 32.6 % — LOW (ref 43–77)
NEUTROPHILS NFR BLD AUTO: 49.1 % — SIGNIFICANT CHANGE UP (ref 43–77)
NEUTROPHILS NFR BLD AUTO: 49.1 % — SIGNIFICANT CHANGE UP (ref 43–77)
NRBC # BLD: 0 /100 WBCS — SIGNIFICANT CHANGE UP (ref 0–0)
NRBC # BLD: 0 /100 WBCS — SIGNIFICANT CHANGE UP (ref 0–0)
NRBC # FLD: 0 K/UL — SIGNIFICANT CHANGE UP (ref 0–0)
NRBC # FLD: 0 K/UL — SIGNIFICANT CHANGE UP (ref 0–0)
PLATELET # BLD AUTO: 265 K/UL — SIGNIFICANT CHANGE UP (ref 150–400)
PLATELET # BLD AUTO: 265 K/UL — SIGNIFICANT CHANGE UP (ref 150–400)
PLATELET # BLD AUTO: 269 K/UL — SIGNIFICANT CHANGE UP (ref 150–400)
PLATELET # BLD AUTO: 269 K/UL — SIGNIFICANT CHANGE UP (ref 150–400)
POTASSIUM SERPL-MCNC: 3.5 MMOL/L — SIGNIFICANT CHANGE UP (ref 3.5–5.3)
POTASSIUM SERPL-MCNC: 3.5 MMOL/L — SIGNIFICANT CHANGE UP (ref 3.5–5.3)
POTASSIUM SERPL-MCNC: 4.2 MMOL/L — SIGNIFICANT CHANGE UP (ref 3.5–5.3)
POTASSIUM SERPL-MCNC: 4.2 MMOL/L — SIGNIFICANT CHANGE UP (ref 3.5–5.3)
POTASSIUM SERPL-SCNC: 3.5 MMOL/L — SIGNIFICANT CHANGE UP (ref 3.5–5.3)
POTASSIUM SERPL-SCNC: 3.5 MMOL/L — SIGNIFICANT CHANGE UP (ref 3.5–5.3)
POTASSIUM SERPL-SCNC: 4.2 MMOL/L — SIGNIFICANT CHANGE UP (ref 3.5–5.3)
POTASSIUM SERPL-SCNC: 4.2 MMOL/L — SIGNIFICANT CHANGE UP (ref 3.5–5.3)
PROT SERPL-MCNC: 8.5 GM/DL — HIGH (ref 6–8.3)
PROT SERPL-MCNC: 8.5 GM/DL — HIGH (ref 6–8.3)
RBC # BLD: 4.62 M/UL — SIGNIFICANT CHANGE UP (ref 4.2–5.8)
RBC # BLD: 4.62 M/UL — SIGNIFICANT CHANGE UP (ref 4.2–5.8)
RBC # BLD: 4.68 M/UL — SIGNIFICANT CHANGE UP (ref 4.2–5.8)
RBC # BLD: 4.68 M/UL — SIGNIFICANT CHANGE UP (ref 4.2–5.8)
RBC # FLD: 12.4 % — SIGNIFICANT CHANGE UP (ref 10.3–14.5)
RBC # FLD: 12.4 % — SIGNIFICANT CHANGE UP (ref 10.3–14.5)
RBC # FLD: 12.5 % — SIGNIFICANT CHANGE UP (ref 10.3–14.5)
RBC # FLD: 12.5 % — SIGNIFICANT CHANGE UP (ref 10.3–14.5)
SODIUM SERPL-SCNC: 140 MMOL/L — SIGNIFICANT CHANGE UP (ref 135–145)
SODIUM SERPL-SCNC: 140 MMOL/L — SIGNIFICANT CHANGE UP (ref 135–145)
SODIUM SERPL-SCNC: 141 MMOL/L — SIGNIFICANT CHANGE UP (ref 135–145)
SODIUM SERPL-SCNC: 141 MMOL/L — SIGNIFICANT CHANGE UP (ref 135–145)
WBC # BLD: 5 K/UL — SIGNIFICANT CHANGE UP (ref 3.8–10.5)
WBC # BLD: 5 K/UL — SIGNIFICANT CHANGE UP (ref 3.8–10.5)
WBC # BLD: 7.97 K/UL — SIGNIFICANT CHANGE UP (ref 3.8–10.5)
WBC # BLD: 7.97 K/UL — SIGNIFICANT CHANGE UP (ref 3.8–10.5)
WBC # FLD AUTO: 5 K/UL — SIGNIFICANT CHANGE UP (ref 3.8–10.5)
WBC # FLD AUTO: 5 K/UL — SIGNIFICANT CHANGE UP (ref 3.8–10.5)
WBC # FLD AUTO: 7.97 K/UL — SIGNIFICANT CHANGE UP (ref 3.8–10.5)
WBC # FLD AUTO: 7.97 K/UL — SIGNIFICANT CHANGE UP (ref 3.8–10.5)

## 2023-11-21 PROCEDURE — 73562 X-RAY EXAM OF KNEE 3: CPT | Mod: 26,50

## 2023-11-21 PROCEDURE — 72125 CT NECK SPINE W/O DYE: CPT | Mod: 26,MD

## 2023-11-21 PROCEDURE — 85025 COMPLETE CBC W/AUTO DIFF WBC: CPT

## 2023-11-21 PROCEDURE — 73590 X-RAY EXAM OF LOWER LEG: CPT | Mod: 26,LT

## 2023-11-21 PROCEDURE — 80053 COMPREHEN METABOLIC PANEL: CPT

## 2023-11-21 PROCEDURE — 93010 ELECTROCARDIOGRAM REPORT: CPT

## 2023-11-21 PROCEDURE — 93005 ELECTROCARDIOGRAM TRACING: CPT

## 2023-11-21 PROCEDURE — 73610 X-RAY EXAM OF ANKLE: CPT | Mod: 26,LT

## 2023-11-21 PROCEDURE — 99284 EMERGENCY DEPT VISIT MOD MDM: CPT

## 2023-11-21 PROCEDURE — 70450 CT HEAD/BRAIN W/O DYE: CPT | Mod: 26,MD

## 2023-11-21 PROCEDURE — 73522 X-RAY EXAM HIPS BI 3-4 VIEWS: CPT | Mod: 26

## 2023-11-21 PROCEDURE — 36415 COLL VENOUS BLD VENIPUNCTURE: CPT

## 2023-11-21 RX ORDER — SODIUM CHLORIDE 9 MG/ML
1000 INJECTION INTRAMUSCULAR; INTRAVENOUS; SUBCUTANEOUS ONCE
Refills: 0 | Status: COMPLETED | OUTPATIENT
Start: 2023-11-21 | End: 2023-11-21

## 2023-11-21 RX ADMIN — OXYCODONE HYDROCHLORIDE 5 MILLIGRAM(S): 5 TABLET ORAL at 00:12

## 2023-11-21 RX ADMIN — Medication 975 MILLIGRAM(S): at 00:12

## 2023-11-21 RX ADMIN — SODIUM CHLORIDE 1000 MILLILITER(S): 9 INJECTION INTRAMUSCULAR; INTRAVENOUS; SUBCUTANEOUS at 17:13

## 2023-11-21 SDOH — ECONOMIC STABILITY - HOUSING INSECURITY: HOMELESSNESS UNSPECIFIED: Z59.00

## 2023-11-21 NOTE — ED PROVIDER NOTE - CLINICAL SUMMARY MEDICAL DECISION MAKING FREE TEXT BOX
34 y/o male here for weakness and lightheadedness. Pt c/o fecal incontinence. No back pain. Pt ambulating independently. Normal strength and sensation. NVI. Plan: labs, fluids, reassess. 32 y/o male here for weakness and lightheadedness. Pt c/o fecal incontinence. No back pain. Pt ambulating independently. Normal strength and sensation. NVI. DDx includes but not limited electrolyte abnormality, viral syndrome. Plan: labs, fluids, reassess.

## 2023-11-21 NOTE — ED ADULT TRIAGE NOTE - CHIEF COMPLAINT QUOTE
BIBA to ED from [facility] with c/o generalized weakness and feeling light headed. . Also reports having a cough and stool incontinence x2, denies N/V/D.  Onset of symptoms x2 days ago. No distress. EKG done in triage and placed on tx

## 2023-11-21 NOTE — CHART NOTE - NSCHARTNOTEFT_GEN_A_CORE
ROSALINO spoke with César @ [facility] [phone] regarding pt placement. Per César, pt has boarding placement at [address] [place] [id number]. Pt will likely require Medicaid taxi to same address upon discharge. MD and RN notified of same. ROSALINO spoke with César @ [facility] [phone] regarding pt placement. Per César, pt has [facility] boarding placement at [address] [place] [id number]. Pt will likely require Medicaid taxi to same address upon discharge. MD and RN notified of same.

## 2023-11-21 NOTE — ED ADULT NURSE NOTE - CHIEF COMPLAINT QUOTE
BIBA to ED from [facility] with c/o generalized weakness and feeling light headed. . Also reports having a cough and stool incontinence x2, denies N/V/D.  Onset of symptoms x2 days ago. No distress. EKG done in triage and placed on tx

## 2023-11-21 NOTE — PROVIDER CONTACT NOTE (OTHER) - BACKGROUND
Writer informed by medical provider pt medically cleared for d/c requesting shelter information and [employer]

## 2023-11-21 NOTE — ED PROVIDER NOTE - ATTENDING CONTRIBUTION TO CARE
I, Xavier Arias MD, personally saw the patient with the resident, and completed the key components of the history and physical exam. I then discussed the management plan with the resident.

## 2023-11-21 NOTE — ED ADULT NURSE NOTE - NSFALLUNIVINTERV_ED_ALL_ED
Bed/Stretcher in lowest position, wheels locked, appropriate side rails in place/Call bell, personal items and telephone in reach/Instruct patient to call for assistance before getting out of bed/chair/stretcher/Non-slip footwear applied when patient is off stretcher/Gerry to call system/Physically safe environment - no spills, clutter or unnecessary equipment/Purposeful proactive rounding/Room/bathroom lighting operational, light cord in reach

## 2023-11-21 NOTE — ED PROVIDER NOTE - NSICDXPASTMEDICALHX_GEN_ALL_CORE_FT
PAST MEDICAL HISTORY:  Asthma     Chronic sinusitis     Closed fracture of multiple ribs of right side, initial encounter     Cocaine abuse     GBS (Guillain [place] syndrome)     HIV (human immunodeficiency virus infection) from birth    HIV disease     Homeless

## 2023-11-21 NOTE — PROVIDER CONTACT NOTE (OTHER) - ASSESSMENT
Writer provided shelter information ([facility]) and [employer] [[phone]]

## 2023-11-21 NOTE — ED PROVIDER NOTE - PROGRESS NOTE DETAILS
Hugo: patient improved. Has no acute findings on lab work. Ambulating independently in ED. Tolerating PO. Stable for dc. Patient understands return precautions and f/u.    Spoke with SW. Patient has bed at boarding room with DSS.

## 2023-11-21 NOTE — ED PROVIDER NOTE - OBJECTIVE STATEMENT
34 y/o male with a PMHx of asthma, chronic sinusitis, closed fracture of multiple ribs, cocaine abuse, GBS, HIV (from birth, unsure of CD4/viral count), on ART–Biktarvy, homeless presents to the ED c/o dizziness and generalized weakness. Pt also reports bowel incontinence x3 days. Denies back pain, saddle anesthesia. Allergies: Toradol, Ceclor. No other complaints at this time.

## 2023-11-21 NOTE — ED PROVIDER NOTE - PATIENT PORTAL LINK FT
You can access the FollowMyHealth Patient Portal offered by [facility] by registering at the following website: [email or web address]. By joining [facility]’s FollowMyHealth portal, you will also be able to view your health information using other applications (apps) compatible with our system.

## 2023-11-21 NOTE — ED ADULT NURSE REASSESSMENT NOTE - NS ED NURSE REASSESS COMMENT FT1
Break RN: Pt is A&Ox4, resting in stretcher with complaints of pain at this time. Respirations even and unlabored, chest rise equal b/l. Pt denies chest pain, SOB, fever, cough, chills, abdominal pain, N/V/D, h/a, dizziness, numbness/tingling or any urinary symptoms at this time. No acute distress noted. 20g IVL placed in RAC. Labs collected and sent. Medication administered as ordered, see MAR. Safety maintained throughout.

## 2023-11-21 NOTE — ED ADULT NURSE NOTE - NSICDXPASTMEDICALHX_GEN_ALL_CORE_FT
PAST MEDICAL HISTORY:  Asthma     Chronic sinusitis     Closed fracture of multiple ribs of right side, initial encounter     Cocaine abuse     GBS (Guillain [place] syndrome)     HIV (human immunodeficiency virus infection) from birth    HIV disease     Homeless

## 2023-11-21 NOTE — ED ADULT NURSE NOTE - OBJECTIVE STATEMENT
The patient is a 33y Male complaining of weakness. Pt endorsing dizziness. Pt is GCS 15, HEENT clear, PERRL, PMS x4, A & O x4.

## 2023-11-22 VITALS
SYSTOLIC BLOOD PRESSURE: 123 MMHG | OXYGEN SATURATION: 98 % | DIASTOLIC BLOOD PRESSURE: 68 MMHG | TEMPERATURE: 97 F | RESPIRATION RATE: 18 BRPM | HEART RATE: 70 BPM

## 2023-11-22 RX ORDER — QUETIAPINE FUMARATE 200 MG/1
100 TABLET, FILM COATED ORAL ONCE
Refills: 0 | Status: COMPLETED | OUTPATIENT
Start: 2023-11-22 | End: 2023-11-22

## 2023-11-22 RX ADMIN — Medication 150 MILLIGRAM(S): at 00:37

## 2023-11-22 RX ADMIN — QUETIAPINE FUMARATE 100 MILLIGRAM(S): 200 TABLET, FILM COATED ORAL at 00:37

## 2023-11-22 NOTE — CHART NOTE - NSCHARTNOTEFT_GEN_A_CORE
ROSALINO confirmed with DSS, Sophie, pt has placement today at shelter in [place]. ROSALINO provided RN with the bus passes to give to pt to take [place] train station ( passes in chart by previous sw). Pt expressed his understanding and agreement with dc today to shelter

## 2023-11-26 NOTE — ED ADULT NURSE NOTE - DISCHARGE DATE/TIME
Writer 22-Aug-2020 08:16

## 2023-12-05 NOTE — ED PROVIDER NOTE - PROGRESS NOTE DETAILS
71.8
ANA Pearce: Pt seen by SW and PT who recommended subacute rehab.  also saw pt who advise for admission for covering CM/SW to follow with d/c plan.  As is the weekend was advised pt would need to be admitted for further management until subacute care can be establish. Pt is aware of this pain and is agreeable to stay. Will order cbc and Cmp for baseline admission labs. Spoke to hospitalist Dr. Jerome who accept pt to medicine.

## 2024-01-01 NOTE — ED ADULT TRIAGE NOTE - ISOLATION TYPE:
NICU Progress Note    Jak Lazo (akmyranda Kimball) is a  female infant born 2025  3:14 AM at Gestational Age: 34w4d now at age: 13 day old    Patient Active Problem List    Diagnosis Date Noted    Hyperbilirubinemia of prematurity 2024     Priority: Low    Prematurity, birth weight 2,000-2,499 grams, with 33-34 completed weeks of gestation (CMD) 2024     Priority: Low    Infant of diabetic mother 2024     Priority: Low     Interim:   Rehana remains stable in room air. No recent alarms.   She is tolerating feedings of EMB fortified to 22 Jevon/oz with Neosure well. She is working on oral feedings and is on a trial of po ad rivka w/ minimums , took 90% by mouth in past day. . Weight change: 10 g (0.4 oz).     Objective:  Vitals Last Value 24-Hour Range   Temperature 98.5 °F (36.9 °C) (24 0815) Temp  Min: 98.3 °F (36.8 °C)  Max: 98.8 °F (37.1 °C)   Pulse 152 (24 0545) Pulse  Min: 145  Max: 194   Respiratory 37 (24 0545) Resp  Min: 22  Max: 88   Non-Invasive Blood Pressure 63/39 (24 0230) BP  Min: 63/39  Max: 81/35   Arterial Blood Pressure   No data recorded   Mean Arterial Pressure 46 (24 0230) MAP (mmHg)  Min: 46  Max: 55   Pulse Oximetry 96 % (24 0545) SpO2  Min: 94 %  Max: 99 %     Last Apnea:    and intervention: Oxygen;Tactile stimulation, moderate;Suction;Other (Comment) (CPAP +5 for approx 60 sec with 30%, then to 2L NC 30%, OG aspirated 3ml mucus, 15+ml air) (24 0901)    Physical Exam:  Birthweight:  5 lb 4 oz (2380 g) with weight change of 6% since birth  Current weight: Patient Vitals for the past 24 hrs:   Weight   24 2515 g (5 lb 8.7 oz)    with a weight change of Weight change: 10 g (0.4 oz) overnight    Gen: Sleeping comfortably in open crib.   Skin:  Pink, well perfused, no edema. Nevus simplex over left eyelid.   HEENT: Anterior Alexander:  Soft, flat. Normal facies. Eyes closed without drainage. NG in place. Small  area of swelling over right parietal scalp c/w caput  Lungs:  Clear to auscultation, no retractions, no tachypnea.  CV:  regular rate and rhythm, no murmur  Abdomen:  Soft, nontender, no distension. Active bowel sounds  Neuro: Normal tone, movement. Strong suck on gloved finger    Fluids:  Source Rate Total (on CW 2410g)   Neo22 or EBM   Ad rivka min ~135ml/kg/day  PO intake mL/kg/d (90%)     Last Void:  1 (24 1020) xadequate   Last Stool: mixed  Emesis: x1    Labs:    No results found for this or any previous visit (from the past 24 hour(s)).      Medications:  Current Facility-Administered Medications   Medication    glycerin 99.5% 0.375 g  liquid    pediatric multivitamin (POLY-VI-SOL) oral solution 0.5 mL       Impression:   female infant at 34 4/7 weeks, now corrected to 36w 3d here due to need for nutritional support.    H/o respiratory distress and hyperbilirubinemia of prematurity needing phototherapy.    Plan:  Resp:  Follow in room air. Monitor for alarms.  CV:  Follow HR/BP.  FEN: continue Neosure 22kcal or EBM fortified to 22 Jevon/oz with Neosure for fortification. Switch to  ad rivka feedings  Monitor weight gain.  On MV split 0.5 ml twice daily.  ID:  Follow clinically for signs of infection. Current antibiotic status:  None   Heme: follow clinically   Neuro: Head ultrasound is not indicated for this infant.         I saw and examined Girl Ariane Lazo on 2024 at 12:13 PM and patient requires: NICU Intensive Care: Continuous monitoring of VS and Enteral/ Parental nutritional adjustments.     None

## 2024-01-10 NOTE — ED STATDOCS - COVID-19  TEST TYPE
[FreeTextEntry1] : He is a 58 year old gentleman.  He presents to the office today. In 2011, he presented with right-sided weakness and numbness due to  a small acute left corona radiata infarct. In 11/2023, he had a headache due to hypertensive urgency. He presents to the office today to establish care following the stroke in 2011. He denies any new focal neurologic symptoms.   MRI Brain 12/15/11:Small acute left corona radiata infarct. CT head/ CTA head and neck 11/16/23: Unremarkable.  Carotid Doppler 1/2/24: Mild right ICA stenosis, <40%. Right thyroid nodule: 0.5 x 0.3cm. Left thyroid nodule: 0.5 x 0.3cm. TCD 1/2/24: Normal  PCP Dr. Sepideh Marsh
MOLECULAR PCR

## 2024-01-25 NOTE — PROGRESS NOTE ADULT - PROBLEM/PLAN-6
----- Message from Luis Armando Bahena MD sent at 1/25/2024  3:08 PM CST -----  Patients mammogram is negative (repeat in a year) but they also mention that your breasts are quite dense and a basic mammogram is not able to fully penetrate and give a perfect picture.  There is a recommendation to get an US of the breast alongside this mammogram to give a more complete picture.  I'd like to go ahead an order it to give more confidence in the findings.  This is optional but it is recommended.  Please order US if agreed upon        
DISPLAY PLAN FREE TEXT

## 2024-01-29 NOTE — ED ADULT TRIAGE NOTE - MEANS OF ARRIVAL
Admission Status; Secondary Review Determination       Under the authority of the Utilization Management Committee, the utilization review process indicated a secondary review on the above patient. The review outcome is based on review of the medical records, discussions with staff, and applying clinical experience noted on the date of the review.     (x) Inpatient Status Appropriate - This patient's medical care is consistent with medical management for inpatient care and reasonable inpatient medical practice.     RATIONALE FOR DETERMINATION   70-year-old male with hypertension, diabetes, among other chronic conditions who presented with sepsis (tachycardia to 130, fever 101 F).  Observed from 1/28-1/29.  Now noted to have blood cultures positive for E. coli, and will need additional days of IV antibiotic treatment in the inpatient setting.    Inpatient admission is appropriate based on the Medicare guidelines.     This document was produced using voice recognition software.    The information on this document is developed by the utilization review team in order for the business office to ensure compliance. This only denotes the appropriateness of proper admission status and does not reflect the quality of care rendered.   The definitions of Inpatient Status and Observation Status used in making the determination above are those provided in the CMS Coverage Manual, Chapter 1 and Chapter 6, section 70.4.     Sincerely,   Licha Esquivel MD  Utilization Review  Physician Advisor  [facility].    stretcher

## 2024-01-30 NOTE — SBIRT NOTE ADULT - NSSBIRTSCREENAVAIL_GEN_A_CORE
Hospitalist Progress Note   Admit Date:  2024  2:33 PM   Name:  Meli Blancas   Age:  69 y.o.  Sex:  female  :  1954   MRN:  [id number]   Room:  [facility]    Presenting/Chief Complaint: Sore and Rash     Reason(s) for Admission: Rash [R21]     Hospital Course:   Meli Blancas is a 69 y.o. female with a h/o mood disorder, COPD who presents today with worsening blisters over her entire body for the past 2-3 weeks. She is convinced it has something to do with her hot water heater being broken and unable to cleans herself. She was admitted and started on steroids and ABX: rocephin, dapsone, vancomycin. Baseline cortisol 23.7, AM cortisol 16.4.    She has had some issues with Sundowning  Had Rapid response 24 due to somnolence but nothing organic to explain    Subjective & 24hr Events:   Rash continues to improve but now with cellulitis and induration and swelling to posterior calves  Dyspnea but states mucinex helps  Appears hypervolemic to LE  MRSA in wound culture  Seems she has not been taking her trazodone nightly  Got disoriented last night again   Was ?sleep walking  Rapid note reviewed  She does remember some of the events that occurred     Assessment & Plan:       Bilateral lower leg cellulitis // pustula rash //MRSA infection  - woriking dx of pemphigus foliaceous   - Dr Humeniuk saw her   - s/p Vancomycin (day 7), change to oral doxycycline for additional 7 days  - dapsone (day 6, she takes this outpatient).    - nozin  - continue steroids: prednisone 40 mg daily; at [place] will send her out on 30 mg  - Aquaphor and kerlex to legs  - wound care ordered  - per Derm, once  better will try cellcept, ultimately may consider rituxan  - continue analgesia: but decrease as this could be contributing to her delirium   - wound cx with MRSA, Nasal swab with MRSA and MSSA  - she may benefit from bleach baths but will defer to derm  - dopplers of LE negative for DVT and abscess   -  Yes

## 2024-02-02 NOTE — ED ADULT NURSE NOTE - VIRAL LOAD DATE - SELF REPORTED
Addended by: IDALIA HAYDEN on: 2/2/2024 09:55 AM     Modules accepted: Orders    
03-May-2021 13:04

## 2024-02-12 NOTE — PATIENT PROFILE ADULT - FALL HARM RISK - TYPE OF ASSESSMENT
Patient is a 82y old  Female who presents with a chief complaint of confusion   2/12/24      SUBJECTIVE / OVERNIGHT EVENTS:pt seen and examined    MEDICATIONS  (STANDING):  melatonin 3 milliGRAM(s) Oral at bedtime  QUEtiapine 25 milliGRAM(s) Oral <User Schedule>  QUEtiapine 50 milliGRAM(s) Oral <User Schedule>  QUEtiapine 12.5 milliGRAM(s) Oral <User Schedule>    MEDICATIONS  (PRN):  acetaminophen     Tablet .. 650 milliGRAM(s) Oral every 6 hours PRN Temp greater or equal to 38C (100.4F), Mild Pain (1 - 3)  OLANZapine Injectable 1.25 milliGRAM(s) IntraMuscular every 6 hours PRN agitation        PHYSICAL EXAM:  GENERAL: NAD  NECK: Supple, No JVD  CHEST/LUNG: dec breath sounds at bases  HEART: Regular rate and rhythm; No murmurs, rubs, or gallops  ABDOMEN: Soft, Nontender, Nondistended; Bowel sounds present  EXTREMITIES:   No edema  NEUROLOGY: Alert awake confused  ambulating on floor without difficulty     LABS:        Creatinine Trend:     Urine Studies:                                                                                                                                                                                                                                                  RADIOLOGY & ADDITIONAL TESTS:    Imaging Personally Reviewed:yes    Consultant(s) Notes Reviewed:  yes    Care Discussed with Consultants/Other Providers:yes     Admission

## 2024-02-19 NOTE — PHYSICAL THERAPY INITIAL EVALUATION ADULT - PRECAUTIONS/LIMITATIONS, REHAB EVAL
----- Message from Benita Kimbrough sent at 2/19/2024 12:23 PM CST -----  Contact: pt  Type: Needs Medical Advice    Who Called: PT Mother - Alma Serra Call Back Number:[phone]    Additional Information: Requesting a call back regarding pt is having experiencing a flare up again.   Pt mother is asking for the office to call her please. Mother is worried and asking for labs to be done to figure out what is going on. Pt has fevers, hives and body aching, hurt to touch.       Please Advise- Thank you       
Mom on her way to [facility] lab to have labs done that were ordered by Dr. Sushil Gibbs. When she gets the results will share with Dr. Montoya.  
fall precautions

## 2024-02-20 NOTE — DISCHARGE NOTE PROVIDER - NSDCCPGOAL_GEN_ALL_CORE_FT
Continue Regimen: WartZone pen (restart  wart zone pen qhs for 3 weeks consistently as tolerated) counseled on importance of using consistently Detail Level: Zone Render In Strict Bullet Format?: No Modify Regimen: Gilda [place] information provided to the pt for eval. To get better and follow your care plan as instructed.

## 2024-04-02 NOTE — PATIENT PROFILE ADULT - NSPROEXTENSIONSOFSELF_GEN_A_NUR
PROGRESS NOTE   With formed through video :  Loc for Irish    Subjective     Mayra is a 66 year old here for Medication Refill and Gastroesophageal Reflux   She is here for follow-up.  She is diabetic, hypertensive, hyperlipidemia, CAD, GERD.  Ran out of medication approximately 2 weeks ago while in [place], taking an antacid. More reflux off omeprazole.  Did have EGD while in [place] showed gastritis and esophagitis.  Had been using an antacid from [place].  Still on metformin and losartan, but ran out of all of her other medications. Not checking her sugars. No hypoglycemia symptoms. No chest pains, some SOB, no change.  No edema, nausea, vomiting, diarrhea, appetite has been good.  Last A1c 1/12/2024, 8.7.  Currently only on metformin.    Review of Systems  As documented above.    SDOH Never Smoker     ?  Objective   Vitals:    04/02/24 1032   BP: 122/62   Pulse: 73   Resp: 18   Temp: 98.6 °F (37 °C)   SpO2: 98%   Weight: 58 kg (127 lb 13.9 oz)   Height: 5' 1\" (1.549 m)     Physical Exam  General: Alert female in no distress.  Neck: supple, no adenopathy, thyromegaly, or bruits.   Respiratory: Normal respiratory effort.   Cardiovascular: Normal rate and regular rhythm. Normal S1, S2. Murmurs are not present.  Lungs: Clear to auscultation. Wheezing, rales or rhonchi not present.  Musculoskeletal: Gait: normal.  Psychiatric: Mood is normal and affect is normal.  Extremities: no edema          ASSESSMENT AND PLAN        1. Type 2 diabetes mellitus with hyperglycemia, without long-term current use of insulin (CMD)  Assessment & Plan:  Monitor: The problem is unchanged.  Evaluation: No labs/tests required today.  As can not be checked into the end.  Assessment/Treatment:  Will on diabetic medications, glipizide mg daily Jardiance mg daily, and hormone continue b.i.d. stressed importance of taking medications regularly, and the need to get blood sugars controlled.  Recheck in 2 months.  2. Diabetes  mellitus treated with oral medication (CMD)  Assessment & Plan:  Monitor: The problem is unchanged.  Evaluation: No labs/tests required today.  As can not be checked into the end.  Assessment/Treatment:  Will on diabetic medications, glipizide mg daily Jardiance mg daily, and hormone continue b.i.d. stressed importance of taking medications regularly, and the need to get blood sugars controlled.  Recheck in 2 months.  Orders:  -     glipiZIDE (GLUCOTROL XL) 5 MG 24 hr tablet; Take 1 tablet by mouth daily.  -     Jardiance 25 MG tablet; Take 1 tablet by mouth daily.  -     rosuvastatin (CRESTOR) 20 MG tablet; Take 1 tablet by mouth daily.  -     metformin (GLUCOPHAGE) 1000 MG tablet; Take 1 tablet by mouth in the morning and 1 tablet in the evening.  3. Gastroesophageal reflux disease, unspecified whether esophagitis present  Assessment & Plan:  Restarted on omeprazole 20 mg b.i.d..  Recommend follow-up with GI if symptoms are persisting.  Orders:  -     omeprazole (PrilOSEC) 20 MG capsule; Take 1 capsule by mouth in the morning and 1 capsule in the evening. Take before meals. Indications: Gastroesophageal Reflux Disease.  4. Primary hypertension  Assessment & Plan:  Blood pressure is fairly well controlled, and only on losartan 100 mg daily.  Does not need to be on metoprolol due to CAD.  Will start on 25 mg daily, will discontinue amlodipine for now.  Orders:  -     metoPROLOL succinate (TOPROL-XL) 25 MG 24 hr tablet; Take 1 tablet by mouth daily.  -     losartan (COZAAR) 100 MG tablet; Take 1 tablet by mouth daily.  5. Atherosclerosis of native coronary artery of native heart without angina pectoris  Assessment & Plan:  Had been off medication since running out in [place].  Had prior stent.  Would recommend putting back on metoprolol, continue on losartan, and restart on Plavix, as well as restarting on rosuvastatin.  Orders:  -     clopidogrel (PLAVIX) 75 MG tablet; Take 1 tablet by mouth daily.  -      rosuvastatin (CRESTOR) 20 MG tablet; Take 1 tablet by mouth daily.    Follow Up  Return in about 3 months (around 6/24/2024) for DM, HTN, lipid, CAD.         none

## 2024-04-12 ENCOUNTER — EMERGENCY (EMERGENCY)
Facility: HOSPITAL | Age: 34
LOS: 1 days | Discharge: HOME | End: 2024-04-12
Admitting: EMERGENCY MEDICINE
Payer: MEDICAID

## 2024-04-12 VITALS
DIASTOLIC BLOOD PRESSURE: 85 MMHG | SYSTOLIC BLOOD PRESSURE: 127 MMHG | WEIGHT: 205.03 LBS | OXYGEN SATURATION: 98 % | HEART RATE: 79 BPM | RESPIRATION RATE: 16 BRPM | TEMPERATURE: 99 F

## 2024-04-12 VITALS
OXYGEN SATURATION: 99 % | HEART RATE: 79 BPM | SYSTOLIC BLOOD PRESSURE: 137 MMHG | TEMPERATURE: 98 F | RESPIRATION RATE: 16 BRPM | DIASTOLIC BLOOD PRESSURE: 73 MMHG

## 2024-04-12 DIAGNOSIS — Z98.890 OTHER SPECIFIED POSTPROCEDURAL STATES: Chronic | ICD-10-CM

## 2024-04-12 PROCEDURE — 99283 EMERGENCY DEPT VISIT LOW MDM: CPT

## 2024-04-12 NOTE — ED PROVIDER NOTE - NSICDXPASTMEDICALHX_GEN_ALL_CORE_FT
PAST MEDICAL HISTORY:  Asthma     Chronic sinusitis     Closed fracture of multiple ribs of right side, initial encounter     Cocaine abuse     GBS (Guillain [place] syndrome)     HIV (human immunodeficiency virus infection) from birth    HIV disease     Homeless

## 2024-04-12 NOTE — ED PROVIDER NOTE - PATIENT PORTAL LINK FT
You can access the FollowMyHealth Patient Portal offered by [facility] by registering at the following website: [email or web address]. By joining "[employer]"’s FollowMyHealth portal, you will also be able to view your health information using other applications (apps) compatible with our system.

## 2024-04-12 NOTE — ED PROVIDER NOTE - CLINICAL SUMMARY MEDICAL DECISION MAKING FREE TEXT BOX
34-year-old male presents this emergency department for chronic conditions, requesting to be placed in a rehabilitation center  However patient was seen at [facility] last week, and attempted to place patient in rehabilitation, however insurance denied all claims.  Patient was then discharged to a shelter, and patient was struck to follow-up with [facility].  Patient has not followed up with DSS    Patient states that he fell last week, however denies any new trauma.  Denies any new symptoms.  In addition patient had an MRI of his low back last week which was unremarkable  Patient is stable for discharge  Results with patient.  Patient understands and agrees with plan.  Reach for primary care doctor in 2 to 3 days.

## 2024-04-12 NOTE — ED PROVIDER NOTE - OBJECTIVE STATEMENT
34-year-old male, history of congenital HIV, GBS in 2017, stroke in 2020 with residual left-sided weakness, asthma (history of no intubations), chronic weakness in his legs, presents this Emergency Department requesting to be placed in a subacute rehabilitation center.  Patient was just discharged from [facility] this morning, went on the train, and came here to [place].  In the transition he called 911, and patient was brought in.  Patient states that he fell a week ago, however denies any recent trauma.  During that visit,  attempted to put him in a subacute rehab, however since this is all chronic, patient was denied by insurance.  However according to notes from his last visit, patient agreed to go to a shelter and was given instruction to follow-up with DSS.  During the last visit patient has had multiple CTs of his head and neck, MRI of his low back, which showed no acute pathology.  Denies saddle paresthesia, bowel/bladder incontinence, urinary retention. Denies IVDA, hx of CA, estrogen use, blood thinner use.

## 2024-04-12 NOTE — ED ADULT TRIAGE NOTE - CHIEF COMPLAINT QUOTE
Pt c/o left leg pain that radiates to back x 1 week, pain worsening today after a trip and fall, denies head strike

## 2024-04-12 NOTE — ED ADULT NURSE NOTE - NSFALLRISKINTERV_ED_ALL_ED

## 2024-04-15 DIAGNOSIS — J45.909 UNSPECIFIED ASTHMA, UNCOMPLICATED: ICD-10-CM

## 2024-04-15 DIAGNOSIS — G89.29 OTHER CHRONIC PAIN: ICD-10-CM

## 2024-04-15 DIAGNOSIS — Z83.0 FAMILY HISTORY OF HUMAN IMMUNODEFICIENCY VIRUS [HIV] DISEASE: ICD-10-CM

## 2024-04-15 DIAGNOSIS — M79.661 PAIN IN RIGHT LOWER LEG: ICD-10-CM

## 2024-04-15 DIAGNOSIS — Z59.01 SHELTERED HOMELESSNESS: ICD-10-CM

## 2024-04-15 DIAGNOSIS — Z88.1 ALLERGY STATUS TO OTHER ANTIBIOTIC AGENTS STATUS: ICD-10-CM

## 2024-04-15 DIAGNOSIS — R53.1 WEAKNESS: ICD-10-CM

## 2024-04-15 DIAGNOSIS — M79.662 PAIN IN LEFT LOWER LEG: ICD-10-CM

## 2024-04-15 DIAGNOSIS — Z86.73 PERSONAL HISTORY OF TRANSIENT ISCHEMIC ATTACK (TIA), AND CEREBRAL INFARCTION WITHOUT RESIDUAL DEFICITS: ICD-10-CM
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
## 2024-04-15 DIAGNOSIS — Z21 ASYMPTOMATIC HUMAN IMMUNODEFICIENCY VIRUS [HIV] INFECTION STATUS: ICD-10-CM

## 2024-04-15 DIAGNOSIS — Z86.69 PERSONAL HISTORY OF OTHER DISEASES OF THE NERVOUS SYSTEM AND SENSE ORGANS: ICD-10-CM

## 2024-04-15 DIAGNOSIS — Z88.6 ALLERGY STATUS TO ANALGESIC AGENT: ICD-10-CM

## 2024-04-15 SDOH — ECONOMIC STABILITY - HOUSING INSECURITY: SHELTERED HOMELESSNESS: Z59.01

## 2024-04-21 NOTE — ED PROVIDER NOTE - DATE/TIME 3
"[facility] of  Chief Complaint   Patient presents with    Syncope     Lasted approximately 1min       Pt BIB EMS for above complaints. [place] when [place] felt \"hot\" and then had syncopal episode. Reports he woke up in approx 1 min and was back to baseline. Denies PMH.  Patient awake, alert, and age-appropriate. Equal/unlabored respirations. Skin pink warm dry. Denies any other sx. No known sick contacts. No further questions or concerns.    Bulgarian  attempt x2 with ipad, reports they have no Tajik interpreters. Google translate used for interaction.    Patient not medicated prior to arrival.   Parent/guardian verbalizes understanding that patient is NPO until seen and cleared by ERP. Education provided about triage process; regarding acuities and possible wait time. Parent/guardian verbalizes understanding to inform staff of any new concerns or change in status.      BP (!) 125/67   Pulse 101   Temp 36.2 °C (97.2 °F) (Temporal)   Resp 28   Ht 1.549 m (5' 1\")   Wt 55.7 kg (122 lb 12.7 oz)   SpO2 97%   BMI 23.20 kg/m²    "
05-Jun-2021 07:01

## 2024-04-23 NOTE — ED ADULT NURSE NOTE - NS ED NOTE ABUSE RESPONSE YN
Physical Therapy Treatment      Patient Name: Beverly Lucas  MRN: [id number]  Today's Date: 4/23/2024  Visit #12/20  Time Calculation  Start Time: 0915  Stop Time: 1000  Time Calculation (min): 45 min      Assessment:  Beverly presents to therapy w/ observable improvements in ROM. Continues to respond well to manual - demonstrating improved tissue quality and decreased pain throughout available ROM. Introduced wall slides today w/ cueing to avoid shoulder hiking.     Patient's response to session: Increased ROM/joint mobility    Patient is likely to benefit from skilled interventions to address their impairments, and treatment that includes: manual techniques to decrease pain and improve joint/soft-tissue mobility, as well as exercises to increase strength, endurance, and neuromotor control.     Plan:  Continue per POC.    Current Problem:   1. Traumatic complete tear of right rotator cuff, subsequent encounter  Follow Up In Physical Therapy      2. Chronic right shoulder pain        3. Impingement syndrome of right shoulder            Subjective   Beverly returns from vacation noting continued improvements in ROM and reduced pain levels.     8.5 weeks post-op    Pain = 0/10    Objective   Shoulder ROM (degrees)   Shoulder Flexion R/L: 130/170   Shoulder Internal Rotation R/L: 50/70   Shoulder External Rotation R/L: 60/80    Treatments:  PT Therapeutic Procedures Time Entry  Manual Therapy Time Entry: 20  Therapeutic Exercise Time Entry: 25    Therapeutic Exercise (54998):  HEP review  Therapeutic exercise (59907):  Access Code: [email or web address]  URL: [email or web address]  Date: 03/29/2024  Prepared by: Renny Hinojosa     Exercises  - Circular Shoulder Pendulum with Table Support  - 1-2 x daily - 7 x weekly - 20 reps  - Flexion-Extension Shoulder Pendulum with Table Support  - 1-2 x daily - 7 x weekly - 20 reps  - Seated Shoulder Flexion AAROM with Pulley Behind  - 1-2 x daily - 7 x weekly - 20 reps  -  Seated Shoulder Scaption AAROM with Pulley at Side  - 1-2 x daily - 7 x weekly - 20 reps  - Standing Isometric Shoulder External Rotation with Doorway  - 1-2 x daily - 7 x weekly - 10 reps - 5 hold  - Standing Shoulder Extension with Dowel  - 1-2 x daily - 7 x weekly - 20 reps  - Supine Shoulder Press with Dowel  - 1-2 x daily - 7 x weekly - 20 reps  - Supine Shoulder Flexion Extension AAROM with Dowel  - 1-2 x daily - 7 x weekly - 20 reps  - Supine Shoulder External Rotation with Dowel  - 1-2 x daily - 7 x weekly - 20 reps  - Cervical Retraction with Overpressure  - 8 x daily - 7 x weekly - 10 reps  - Seated Cervical Retraction and Extension  - 8 x daily - 7 x weekly - 10 reps    -UBE: 4'  -Pulleys: Flexion / Scaption / ABD  -Finger ladder -> eccentric wall slides (26)  -Standing wand extension  -Supine SA press up  -Wall slides w/ pillow case (130 degrees)    Assessment      Manual Therapy (23338):  GHJ: Posterior, lateral, inferior: Grade III-IV  PROM: All directions  TRP: Subscap, UT, pectorals, Lats  First-rib mobilization: Grade III-IV        Education and discussion on HEP and treatment regarding the benefits related to current condition, POC, pathophysiology, and precautions    *added to HEP   Yes

## 2024-04-26 ENCOUNTER — EMERGENCY (EMERGENCY)
Facility: HOSPITAL | Age: 34
LOS: 1 days | Discharge: HOME | End: 2024-04-26
Attending: EMERGENCY MEDICINE | Admitting: STUDENT IN AN ORGANIZED HEALTH CARE EDUCATION/TRAINING PROGRAM
Payer: MEDICAID

## 2024-04-26 VITALS
DIASTOLIC BLOOD PRESSURE: 97 MMHG | HEART RATE: 97 BPM | TEMPERATURE: 98 F | RESPIRATION RATE: 16 BRPM | HEIGHT: 73 IN | SYSTOLIC BLOOD PRESSURE: 141 MMHG | WEIGHT: 210.1 LBS | OXYGEN SATURATION: 97 %

## 2024-04-26 VITALS
RESPIRATION RATE: 16 BRPM | DIASTOLIC BLOOD PRESSURE: 74 MMHG | TEMPERATURE: 98 F | SYSTOLIC BLOOD PRESSURE: 122 MMHG | HEART RATE: 80 BPM | OXYGEN SATURATION: 98 %

## 2024-04-26 DIAGNOSIS — Z98.890 OTHER SPECIFIED POSTPROCEDURAL STATES: Chronic | ICD-10-CM

## 2024-04-26 LAB
ALBUMIN SERPL ELPH-MCNC: 3.3 G/DL — SIGNIFICANT CHANGE UP (ref 3.3–5)
ALP SERPL-CCNC: 74 U/L — SIGNIFICANT CHANGE UP (ref 40–120)
ALT FLD-CCNC: 26 U/L — SIGNIFICANT CHANGE UP (ref 12–78)
ANION GAP SERPL CALC-SCNC: 7 MMOL/L — SIGNIFICANT CHANGE UP (ref 5–17)
APTT BLD: 24.6 SEC — SIGNIFICANT CHANGE UP (ref 24.5–35.6)
AST SERPL-CCNC: 20 U/L — SIGNIFICANT CHANGE UP (ref 15–37)
BASOPHILS # BLD AUTO: 0.03 K/UL — SIGNIFICANT CHANGE UP (ref 0–0.2)
BASOPHILS NFR BLD AUTO: 0.4 % — SIGNIFICANT CHANGE UP (ref 0–2)
BILIRUB SERPL-MCNC: 0.6 MG/DL — SIGNIFICANT CHANGE UP (ref 0.2–1.2)
BUN SERPL-MCNC: 11 MG/DL — SIGNIFICANT CHANGE UP (ref 7–23)
CALCIUM SERPL-MCNC: 8.6 MG/DL — SIGNIFICANT CHANGE UP (ref 8.5–10.1)
CHLORIDE SERPL-SCNC: 109 MMOL/L — HIGH (ref 96–108)
CO2 SERPL-SCNC: 26 MMOL/L — SIGNIFICANT CHANGE UP (ref 22–31)
CREAT SERPL-MCNC: 0.8 MG/DL — SIGNIFICANT CHANGE UP (ref 0.5–1.3)
EGFR: 118 ML/MIN/1.73M2 — SIGNIFICANT CHANGE UP
EOSINOPHIL # BLD AUTO: 0.02 K/UL — SIGNIFICANT CHANGE UP (ref 0–0.5)
EOSINOPHIL NFR BLD AUTO: 0.3 % — SIGNIFICANT CHANGE UP (ref 0–6)
GLUCOSE SERPL-MCNC: 88 MG/DL — SIGNIFICANT CHANGE UP (ref 70–99)
HCT VFR BLD CALC: 40.1 % — SIGNIFICANT CHANGE UP (ref 39–50)
HCV AB S/CO SERPL IA: 0.38 S/CO — SIGNIFICANT CHANGE UP (ref 0–0.99)
HCV AB SERPL-IMP: SIGNIFICANT CHANGE UP
HGB BLD-MCNC: 13.1 G/DL — SIGNIFICANT CHANGE UP (ref 13–17)
IMM GRANULOCYTES NFR BLD AUTO: 0.3 % — SIGNIFICANT CHANGE UP (ref 0–0.9)
INR BLD: 1.06 RATIO — SIGNIFICANT CHANGE UP (ref 0.85–1.18)
LYMPHOCYTES # BLD AUTO: 1.8 K/UL — SIGNIFICANT CHANGE UP (ref 1–3.3)
LYMPHOCYTES # BLD AUTO: 22.9 % — SIGNIFICANT CHANGE UP (ref 13–44)
MCHC RBC-ENTMCNC: 29.8 PG — SIGNIFICANT CHANGE UP (ref 27–34)
MCHC RBC-ENTMCNC: 32.7 GM/DL — SIGNIFICANT CHANGE UP (ref 32–36)
MCV RBC AUTO: 91.1 FL — SIGNIFICANT CHANGE UP (ref 80–100)
MONOCYTES # BLD AUTO: 0.74 K/UL — SIGNIFICANT CHANGE UP (ref 0–0.9)
MONOCYTES NFR BLD AUTO: 9.4 % — SIGNIFICANT CHANGE UP (ref 2–14)
NEUTROPHILS # BLD AUTO: 5.25 K/UL — SIGNIFICANT CHANGE UP (ref 1.8–7.4)
NEUTROPHILS NFR BLD AUTO: 66.7 % — SIGNIFICANT CHANGE UP (ref 43–77)
NRBC # BLD: 0 /100 WBCS — SIGNIFICANT CHANGE UP (ref 0–0)
PLATELET # BLD AUTO: 303 K/UL — SIGNIFICANT CHANGE UP (ref 150–400)
POTASSIUM SERPL-MCNC: 3.8 MMOL/L — SIGNIFICANT CHANGE UP (ref 3.5–5.3)
POTASSIUM SERPL-SCNC: 3.8 MMOL/L — SIGNIFICANT CHANGE UP (ref 3.5–5.3)
PROT SERPL-MCNC: 7.1 G/DL — SIGNIFICANT CHANGE UP (ref 6–8.3)
PROTHROM AB SERPL-ACNC: 12.4 SEC — SIGNIFICANT CHANGE UP (ref 9.5–13)
RBC # BLD: 4.4 M/UL — SIGNIFICANT CHANGE UP (ref 4.2–5.8)
RBC # FLD: 13.9 % — SIGNIFICANT CHANGE UP (ref 10.3–14.5)
SODIUM SERPL-SCNC: 142 MMOL/L — SIGNIFICANT CHANGE UP (ref 135–145)
WBC # BLD: 7.86 K/UL — SIGNIFICANT CHANGE UP (ref 3.8–10.5)
WBC # FLD AUTO: 7.86 K/UL — SIGNIFICANT CHANGE UP (ref 3.8–10.5)

## 2024-04-26 PROCEDURE — 36415 COLL VENOUS BLD VENIPUNCTURE: CPT

## 2024-04-26 PROCEDURE — 85025 COMPLETE CBC W/AUTO DIFF WBC: CPT

## 2024-04-26 PROCEDURE — 74177 CT ABD & PELVIS W/CONTRAST: CPT | Mod: MC

## 2024-04-26 PROCEDURE — 99285 EMERGENCY DEPT VISIT HI MDM: CPT | Mod: 25

## 2024-04-26 PROCEDURE — 80053 COMPREHEN METABOLIC PANEL: CPT

## 2024-04-26 PROCEDURE — 74177 CT ABD & PELVIS W/CONTRAST: CPT | Mod: 26,MC

## 2024-04-26 PROCEDURE — 85730 THROMBOPLASTIN TIME PARTIAL: CPT

## 2024-04-26 PROCEDURE — 86803 HEPATITIS C AB TEST: CPT

## 2024-04-26 PROCEDURE — 99284 EMERGENCY DEPT VISIT MOD MDM: CPT | Mod: 25

## 2024-04-26 PROCEDURE — 85610 PROTHROMBIN TIME: CPT

## 2024-04-26 RX ORDER — SODIUM CHLORIDE 9 MG/ML
1000 INJECTION INTRAMUSCULAR; INTRAVENOUS; SUBCUTANEOUS ONCE
Refills: 0 | Status: COMPLETED | OUTPATIENT
Start: 2024-04-26 | End: 2024-04-26

## 2024-04-26 RX ADMIN — SODIUM CHLORIDE 1000 MILLILITER(S): 9 INJECTION INTRAMUSCULAR; INTRAVENOUS; SUBCUTANEOUS at 02:44

## 2024-04-26 NOTE — ED ADULT NURSE NOTE - NSICDXPASTMEDICALHX_GEN_ALL_CORE_FT
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

## 2024-04-26 NOTE — CARE COORDINATION ASSESSMENT. - REASON FOR CONSULT
SW met with pt in the ED in order to conduct assessment and to discuss SW roles with good understanding./coordination of care/discharge planning

## 2024-04-26 NOTE — ED ADULT TRIAGE NOTE - TEMPERATURE IN CELSIUS (DEGREES C)
Mary Lou Lopez RN   Plan last modified:   Mary Lou Lopez RN (10/4/Mary Lou Lopez RN                 
36.8

## 2024-04-26 NOTE — ED PROVIDER NOTE - NSFOLLOWUPINSTRUCTIONS_ED_ALL_ED_FT
Diarrhea, Adult  Diarrhea is when you pass loose and sometimes watery poop (stool) often. Diarrhea can make you feel weak and cause you to lose water in your body (get dehydrated). Losing water in your body can cause you to:  Feel tired and thirsty.  Have a dry mouth.  Go pee (urinate) less often.  Diarrhea often lasts 2–3 days. It can last longer if it is a sign of something more serious. Be sure to treat your diarrhea as told by your doctor.    Follow these instructions at home:  Eating and drinking    A bottle of clear juice and a glass of water.  Bread, rice, and cereal from the grain group.   Follow these instructions as told by your doctor:  Take an ORS (oral rehydration solution). This is a drink that helps you replace fluids and minerals your body lost. It is sold at pharmacies and stores.  Drink enough fluid to keep your pee (urine) pale yellow.  Drink fluids such as:  Water. You can also get fluids by sucking on ice chips.  Diluted fruit juice.  Low-calorie sports drinks.  Milk.  Avoid drinking fluids that have a lot of sugar or caffeine in them. These include soda, energy drinks, and regular sports drinks.  Avoid alcohol.  Eat bland, easy-to-digest foods in small amounts as you are able. These foods include:  Bananas.  Applesauce.  Rice.  Low-fat (lean) meats.  Toast.  Crackers.  Avoid spicy or fatty foods.  Medicines    Take over-the-counter and prescription medicines only as told by your doctor.  If you were prescribed antibiotics, take them as told by your doctor. Do not stop taking them even if you start to feel better.  General instructions    Washing hands with soap and water.  Wash your hands often using soap and water for 20 seconds. If soap and water are not available, use hand . Others in your home should wash their hands as well. Wash your hands:  After using the toilet or changing a diaper.  Before preparing, cooking, or serving food.  While caring for a sick person.  While visiting someone in a hospital.  Rest at home while you get better.  Take a warm bath to help with any burning or pain from having diarrhea.  Watch your condition for any changes.  Contact a doctor if:  You have a fever.  Your diarrhea gets worse.  You have new symptoms.  You vomit every time you eat or drink.  You feel light-headed, dizzy, or you have a headache.  You have muscle cramps.  You have signs of losing too much water in your body, such as:  Dark pee, very little pee, or no pee.  Cracked lips.  Dry mouth.  Sunken eyes.  Sleepiness.  Weakness.  You have bloody or black poop or poop that looks like tar.  You have very bad pain, cramping, or bloating in your belly (abdomen).  Your skin feels cold and clammy.  You feel confused.  Get help right away if:  You have chest pain.  Your heart is beating very quickly.  You have trouble breathing or you are breathing very quickly.  You feel very weak or you faint.  These symptoms may be an emergency. Get help right away. Call 911.  Do not wait to see if the symptoms will go away.  Do not drive yourself to the hospital.  This information is not intended to replace advice given to you by your health care provider. Make sure you discuss any questions you have with your health care provider.    Document Revised: 06/06/2023 Document Reviewed: 06/06/2023  Elsevier Patient Education © 2024 [employer] Inc.

## 2024-04-26 NOTE — ED PROVIDER NOTE - OBJECTIVE STATEMENT
34-year-old male with significant past medical history for HIV, Keily Barré, CVA, cocaine abuse with complaints of low back pain, weakness, diarrhea, blood in his stool x 3 days.  Patient has been to multiple hospitals for similar symptoms in the past however diarrhea and rectal bleeding or any new symptom.  Patient on gabapentin and Percocet for chronic pain.  Patient with recent hospitalization 2 weeks ago.  [place]  Patient is homeless.

## 2024-04-26 NOTE — CARE COORDINATION ASSESSMENT. - PRO ARRIVE FROM
Pt shared that he was living on the streets. Pt reported that he was released from senior care on April 1st./other (specify)

## 2024-04-26 NOTE — CARE COORDINATION ASSESSMENT. - REFERRAL INFORMATION CONCERNS
Pt homeless, pt requesting assistance with placement.   Pt mentioned that his mailing address in [facility]. Will send completed application and [place] for review. Per [facility] DSS they had up to 48 hrs to respond./concerns to be addressed

## 2024-04-26 NOTE — ED PROVIDER NOTE - PATIENT PORTAL LINK FT
You can access the FollowMyHealth Patient Portal offered by [facility] by registering at the following website: [email or web address]. By joining [facility]’s FollowMyHealth portal, you will also be able to view your health information using other applications (apps) compatible with our system.

## 2024-04-26 NOTE — ED PROVIDER NOTE - CLINICAL SUMMARY MEDICAL DECISION MAKING FREE TEXT BOX
34-year-old male with chronic medical conditions now with persistent back pain, diarrhea, rectal bleeding.  Labs check H&H, occult, pain control.  Possible social work for further disposition. 34-year-old male with chronic medical conditions now with persistent back pain, diarrhea, rectal bleeding.  Labs check H&H, occult, pain control.  Possible social work for further disposition.  -----  Kana - received pt in s/o at 0700. Presented with back pain and diarrhea. Labs and CT unremarkable. Patient currently homeless, pending SW. 34-year-old male with chronic medical conditions now with persistent back pain, diarrhea, rectal bleeding.  Labs check H&H, occult, pain control.  Possible social work for further disposition.  -----  Kana - received pt in s/o at 0700. Presented with back pain and diarrhea. Labs and CT unremarkable. Patient currently homeless, pending SW.  1300 - Patient has been seen and evaluated by , application for self at [facility] has been submitted.  However patient states he wishes to be discharged at this time, he does not want to wait for his housing application.  Outpatient resources and shelter resources are provided, patient has no acute medical complaints at this time, for discharge home.

## 2024-04-26 NOTE — ED ADULT NURSE REASSESSMENT NOTE - NS ED NURSE REASSESS COMMENT FT1
assumed care of pt. pt resting comfortably in bed. explained POC. call bell in reach. pt safety maintained.

## 2024-04-26 NOTE — ED PROVIDER NOTE - CARE PROVIDER_API CALL
Easton Melendez  Internal Medicine  [address] [place] [id number]  Phone: [phone]  Fax: [phone]  Follow Up Time:

## 2024-04-26 NOTE — ED PEDIATRIC NURSE NOTE - NSICDXPASTMEDICALHX_GEN_ALL_CORE_FT
PAST MEDICAL HISTORY:  Asthma     Chronic sinusitis     Closed fracture of multiple ribs of right side, initial encounter     Cocaine abuse     GBS (Guillain [place] syndrome)     HIV (human immunodeficiency virus infection) from birth    HIV disease     Homeless

## 2024-04-26 NOTE — ED ADULT TRIAGE NOTE - CHIEF COMPLAINT QUOTE
Patient brought by ambulance from home as reported having mid to lower back pain; with history of guillain barre syndrome; took oxycodone and gabapentin 2 hours ago

## 2024-04-26 NOTE — ED PROVIDER NOTE - TOBACCO USE
Left message for patient to call back at [phone] to schedule below procedure. Mailed letter for patient to contact office to schedule procedure.   Unknown if ever smoked

## 2024-04-26 NOTE — ED PROVIDER NOTE - PROGRESS NOTE DETAILS
Patient made aware of labs and CT.  No evidence for colitis or GI bleed based on labs and/or CT.  Patient declines occult.  Patient requesting social work for possible placement.

## 2024-04-26 NOTE — CARE COORDINATION ASSESSMENT. - NSCAREPROVIDERS_GEN_ALL_CORE_FT
CARE PROVIDERS:  Admitting: Doctor, Unknown  Attending: Doctor, Melony  ED Attending: Giulia Diaz  ED Attending2: Rene Velarde  ED Nurse: Neetu Sky  Nurse: Yemi Lyons  Nurse: Laura Tam  Nurse: Julianna Tucker  Outpatient Provider: Nissenbaum, Michael  Outpatient Provider: Easton Hunter  Primary Team: Giulia Diaz  : Ayala Ramirez

## 2024-04-26 NOTE — CARE COORDINATION ASSESSMENT. - NSPASTMEDSURGHISTORY_GEN_ALL_CORE_FT
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      History of orthopedic surgery  left arm      Asthma      Homeless      Chronic sinusitis      Cocaine abuse      Closed fracture of multiple ribs of right side, initial encounter      GBS (Guillain [place] syndrome)      HIV disease

## 2024-04-26 NOTE — ED PROVIDER NOTE - NSICDXPASTMEDICALHX_GEN_ALL_CORE_FT
PAST MEDICAL HISTORY:  Asthma     Chronic sinusitis     Closed fracture of multiple ribs of right side, initial encounter     Cocaine abuse     GBS (Guillain [place] syndrome)     HIV (human immunodeficiency virus infection) from birth    HIV disease     Homeless

## 2024-04-26 NOTE — ED ADULT NURSE NOTE - NSFALLUNIVINTERV_ED_ALL_ED
Bed/Stretcher in lowest position, wheels locked, appropriate side rails in place/Call bell, personal items and telephone in reach/Instruct patient to call for assistance before getting out of bed/chair/stretcher/Non-slip footwear applied when patient is off stretcher/[place] to call system/Physically safe environment - no spills, clutter or unnecessary equipment/Purposeful proactive rounding/Room/bathroom lighting operational, light cord in reach

## 2024-04-28 NOTE — PHYSICAL THERAPY INITIAL EVALUATION ADULT - CRITERIA FOR SKILLED THERAPEUTIC INTERVENTIONS
TREATMENT PLAN (Medication Management Only)        [facility]    Name and Date of Birth:  Mikala Simmons 48 y.o. 1976  Date of Treatment Plan: April 28, 2024  Diagnosis/Diagnoses:    1. Moderate episode of recurrent major depressive disorder (HCC)    2. Generalized anxiety disorder    3. PTSD (post-traumatic stress disorder)      Strengths/Personal Resources for Self-Care: supportive family, taking medications as prescribed, ability to understand psychiatric illness, motivation for treatment, willingness to work on problems.  Area/Areas of need (in own words): anxiety, depression  1. Long Term Goal: continue improvement in depression and anxiety  Target Date:3 months - 7/28/2024  Person/Persons responsible for completion of goal: Mikala  2.  Short Term Objective (s) - How will we reach this goal?:   A. Provider new recommended medication/dosage changes and/or continue medication(s): continue current medications as prescribed.  B. Attend psychotherapy regularly.  C. Attend medication management appointments regularly.  Target Date:3 months - 7/28/2024  Person/Persons Responsible for Completion of Goal: Mikala  Progress Towards Goals: continuing treatment  Treatment Modality: medication management every 4 weeks  Review due 180 days from date of this plan: 4 months - 8/28/2024  Expected length of service: ongoing treatment  My Physician/PA/NP and I have developed this plan together and I agree to work on the goals and objectives. I understand the treatment goals that were developed for my treatment.  Treatment Plan done but not signed at time of office visit due to:  Plan reviewed by phone or in person  and verbal consent given due to virtual appt.   Rosette Orellana Pa-C    
impairments found/functional limitations in following categories

## 2024-04-30 NOTE — ED ADULT TRIAGE NOTE - NSSEPSISSUSPECTED_ED_A_ED
Follow Up Visit Established Patient Visit    Patient:  Dawn Medina                                               : 1946  Age: 77 y.o.  MRN: [id number]  Date : 2024    Dawn Medina is a 77 y.o. female who presents for : Follow-up appointment  77-year-old female with history of hypertension, diabetes mellitus type 2 with diabetic neuropathy, and lightheadedness.    Chief Complaint   Patient presents with    Mass     Left upper abdomen     Reports a lump in her left upper abdominal quadrant.  There is no abdominal pain.  No fever/chills.  No unexplained weight loss/gain.  No constipation. Had a normal colonoscopy last November  No dizziness/ lightheadedness.  Checks her blood glucose 3 times/ week. Blood glucose averages 135 mg/dL.  Hb A1c 6.5% on 2024  + History of motion sickness  Follows up with Dr. Shearer, medical oncologist from the [facility], for history of left-sided breast cancer    Past Medical History:   Diagnosis Date    Arthritis     Ataxia     Bladder prolapse     Breast cancer (HCC)     left - intraductal CA; resection, chemo & XRT    Cataract     Diabetes mellitus (HCC)     Hepatitis C     titers and RNA cleared    HTN (hypertension)     Hyperlipidemia     LBP (low back pain)     Lichen planus     oral    Melanoma (HCC)     right upper arm    Meningioma (HCC)     Skin cancer     basal cell     Wrist fracture, right        Past Surgical History:   Procedure Laterality Date    BREAST CYST EXCISION      left - benign    CARDIOVASCULAR STRESS TEST  2010    negative    CARPAL TUNNEL RELEASE  ,     bilateral    CATARACT REMOVAL WITH IMPLANT      OU    CHOLECYSTECTOMY      HYSTERECTOMY (CERVIX STATUS UNKNOWN)      KNEE CARTILAGE SURGERY  ,     bilat    SKIN CANCER EXCISION      nose - basal cell    SKIN CANCER EXCISION      melanoma - RUE    TOTAL KNEE ARTHROPLASTY Right 2013    WRIST SURGERY Right        Current 
No

## 2024-05-07 ENCOUNTER — INPATIENT (INPATIENT)
Facility: HOSPITAL | Age: 34
LOS: 5 days | Discharge: LEFT BEFORE DISCHARGE | DRG: 96 | End: 2024-05-13
Attending: STUDENT IN AN ORGANIZED HEALTH CARE EDUCATION/TRAINING PROGRAM | Admitting: HOSPITALIST
Payer: COMMERCIAL

## 2024-05-07 VITALS
HEART RATE: 86 BPM | HEIGHT: 73 IN | SYSTOLIC BLOOD PRESSURE: 141 MMHG | RESPIRATION RATE: 18 BRPM | OXYGEN SATURATION: 98 % | DIASTOLIC BLOOD PRESSURE: 83 MMHG | TEMPERATURE: 98 F

## 2024-05-07 DIAGNOSIS — Z98.890 OTHER SPECIFIED POSTPROCEDURAL STATES: Chronic | ICD-10-CM

## 2024-05-07 PROCEDURE — 36000 PLACE NEEDLE IN VEIN: CPT | Mod: 59

## 2024-05-07 PROCEDURE — 99285 EMERGENCY DEPT VISIT HI MDM: CPT | Mod: 25

## 2024-05-07 PROCEDURE — 62270 DX LMBR SPI PNXR: CPT

## 2024-05-07 RX ORDER — SODIUM CHLORIDE 9 MG/ML
1000 INJECTION INTRAMUSCULAR; INTRAVENOUS; SUBCUTANEOUS ONCE
Refills: 0 | Status: COMPLETED | OUTPATIENT
Start: 2024-05-07 | End: 2024-05-07

## 2024-05-07 RX ORDER — ACETAMINOPHEN 500 MG
975 TABLET ORAL ONCE
Refills: 0 | Status: COMPLETED | OUTPATIENT
Start: 2024-05-07 | End: 2024-05-07

## 2024-05-07 NOTE — ED PROVIDER NOTE - CLINICAL SUMMARY MEDICAL DECISION MAKING FREE TEXT BOX
patient with viral symptoms and weakness in bilateral lower extremities, normal reflexes and sensation on exam.  Concern for viral illness versus less likely GBS.  Plan for labs, IV fluids, pain control, viral swab and reassess if symptoms not improving consider LP for possible GBS. patient with viral symptoms and weakness in bilateral lower extremities, normal reflexes and sensation on exam.  Concern for viral illness versus less likely GBS.  Plan for labs, IV fluids, pain control, viral swab and reassess if symptoms not improving consider LP for possible GBS.  See progress note

## 2024-05-07 NOTE — ED PROVIDER NOTE - PROGRESS NOTE DETAILS
Patient positive for entero and rhinovirus.  When attempting to ambulate patient patient with shuffling gait stating he has difficulty moving his feet and lower extremities.  Given history of prior GBS and now with new foot weakness in setting of recent viral illness LP obtained.  Patient started on IVIG for possible GBS, neurology consult placed for the morning.  Patient  admitted to medicine.  Patient with no current respiratory complaints, no concern for respiratory difficulties.

## 2024-05-07 NOTE — ED PROVIDER NOTE - NSICDXPASTMEDICALHX_GEN_ALL_CORE_FT
PAST MEDICAL HISTORY:  Asthma     Chronic sinusitis     Closed fracture of multiple ribs of right side, initial encounter     Cocaine abuse     GBS (Guillain [place] syndrome)     HIV (human immunodeficiency virus infection) from birth    HIV disease     Homeless

## 2024-05-07 NOTE — ED PROVIDER NOTE - OBJECTIVE STATEMENT
34-year-old male history of cocaine abuse, HIV from birth, homelessness presenting with bilateral lower extremity weakness today.  States he has had viral symptoms over the last couple days (nasal congestion headache) and today was having a bit of a hard time walking.  Denies pain, vomiting, diarrhea, fever, drug use.  States he has had Guillain-Barré before but this feels different.

## 2024-05-07 NOTE — ED ADULT TRIAGE NOTE - CHIEF COMPLAINT QUOTE
[place] d/t difficulty walking, pt has pain in his legs and unable to bend at the knee much. PMHx of neuropathy on gabapentin and lyrica, endorses taking them today. Pt is neurologically intact showing no deficits. Pt [place]IBEMS from train station after calling d/t difficulty walking, pt has pain in his legs and unable to bend at the knee much. PMHx of neuropathy on gabapentin and lyrica, endorses taking them today. Pt is neurologically intact showing no deficits. BG 92

## 2024-05-07 NOTE — ED PROVIDER NOTE - PHYSICAL EXAMINATION
Gen: Well appearing in NAD  Head: NC/AT  Neck: trachea midline  Resp:  No distress, CTAB  CV: RRR  GI: soft, NTND  Ext:   bilateral lower extremity sensation intact, normal pulses, normal patellar and Achilles reflexes.  Able to lift legs off the bed but only  able to hold them in the air for 2 to 3 seconds.  Neuro:  A&O appears non focal  Skin:  Warm and dry as visualized  Psych:  Normal affect and mood

## 2024-05-08 DIAGNOSIS — G61.0 GUILLAIN-BARRE SYNDROME: ICD-10-CM

## 2024-05-08 LAB
A1C WITH ESTIMATED AVERAGE GLUCOSE RESULT: 4.4 % — SIGNIFICANT CHANGE UP (ref 4–5.6)
ALBUMIN CSF-MCNC: 22.6 MG/DL — SIGNIFICANT CHANGE UP (ref 14–25)
ALBUMIN SERPL ELPH-MCNC: 3305 MG/DL — LOW (ref 3500–5200)
ALBUMIN SERPL ELPH-MCNC: 4.1 G/DL — SIGNIFICANT CHANGE UP (ref 3.3–5.2)
ALP SERPL-CCNC: 85 U/L — SIGNIFICANT CHANGE UP (ref 40–120)
ALT FLD-CCNC: 15 U/L — SIGNIFICANT CHANGE UP
ANION GAP SERPL CALC-SCNC: 17 MMOL/L — SIGNIFICANT CHANGE UP (ref 5–17)
APPEARANCE CSF: CLEAR — SIGNIFICANT CHANGE UP
APPEARANCE UR: CLEAR — SIGNIFICANT CHANGE UP
AST SERPL-CCNC: 26 U/L — SIGNIFICANT CHANGE UP
BACTERIA # UR AUTO: ABNORMAL /HPF
BASOPHILS # BLD AUTO: 0.01 K/UL — SIGNIFICANT CHANGE UP (ref 0–0.2)
BASOPHILS NFR BLD AUTO: 0.2 % — SIGNIFICANT CHANGE UP (ref 0–2)
BILIRUB SERPL-MCNC: 0.5 MG/DL — SIGNIFICANT CHANGE UP (ref 0.4–2)
BILIRUB UR-MCNC: ABNORMAL
BUN SERPL-MCNC: 15.2 MG/DL — SIGNIFICANT CHANGE UP (ref 8–20)
CALCIUM SERPL-MCNC: 8.8 MG/DL — SIGNIFICANT CHANGE UP (ref 8.4–10.5)
CAST: 1 /LPF — SIGNIFICANT CHANGE UP (ref 0–4)
CHLORIDE SERPL-SCNC: 96 MMOL/L — SIGNIFICANT CHANGE UP (ref 96–108)
CK MB CFR SERPL CALC: 1.4 NG/ML — SIGNIFICANT CHANGE UP (ref 0–6.7)
CK SERPL-CCNC: 174 U/L — SIGNIFICANT CHANGE UP (ref 30–200)
CO2 SERPL-SCNC: 22 MMOL/L — SIGNIFICANT CHANGE UP (ref 22–29)
COLOR CSF: SIGNIFICANT CHANGE UP
COLOR SPEC: SIGNIFICANT CHANGE UP
CREAT SERPL-MCNC: 0.71 MG/DL — SIGNIFICANT CHANGE UP (ref 0.5–1.3)
CRP SERPL-MCNC: 10 MG/L — HIGH
DIFF PNL FLD: NEGATIVE — SIGNIFICANT CHANGE UP
EGFR: 123 ML/MIN/1.73M2 — SIGNIFICANT CHANGE UP
EOSINOPHIL # BLD AUTO: 0.13 K/UL — SIGNIFICANT CHANGE UP (ref 0–0.5)
EOSINOPHIL NFR BLD AUTO: 2.4 % — SIGNIFICANT CHANGE UP (ref 0–6)
ERYTHROCYTE [SEDIMENTATION RATE] IN BLOOD: 30 MM/HR — HIGH (ref 0–15)
ESTIMATED AVERAGE GLUCOSE: 80 MG/DL — SIGNIFICANT CHANGE UP (ref 68–114)
GLUCOSE CSF-MCNC: 55 MG/DLG/24H — SIGNIFICANT CHANGE UP (ref 40–70)
GLUCOSE SERPL-MCNC: 71 MG/DL — SIGNIFICANT CHANGE UP (ref 70–99)
GLUCOSE UR QL: NEGATIVE MG/DL — SIGNIFICANT CHANGE UP
GRAM STN FLD: SIGNIFICANT CHANGE UP
HCT VFR BLD CALC: 35.8 % — LOW (ref 39–50)
HCT VFR BLD CALC: 36 % — LOW (ref 39–50)
HGB BLD-MCNC: 12 G/DL — LOW (ref 13–17)
HIV 1 & 2 AB SERPL IA.RAPID: REACTIVE
IGG CSF-MCNC: 5.4 MG/DL — HIGH
IGG FLD-MCNC: 957 MG/DL — SIGNIFICANT CHANGE UP (ref 610–1660)
IGG SYNTH RATE SER+CSF CALC-MRATE: 8.9 MG/DAY — HIGH
IGG/ALB CLEAR SER+CSF-RTO: 0.8 — HIGH
IGG/ALB CSF: 0.24 RATIO — SIGNIFICANT CHANGE UP
IGG/ALB SER: 0.29 RATIO — SIGNIFICANT CHANGE UP
IMM GRANULOCYTES NFR BLD AUTO: 0.2 % — SIGNIFICANT CHANGE UP (ref 0–0.9)
KETONES UR-MCNC: 40 MG/DL
LEUKOCYTE ESTERASE UR-ACNC: NEGATIVE — SIGNIFICANT CHANGE UP
LYMPHOCYTES # BLD AUTO: 1.4 K/UL — SIGNIFICANT CHANGE UP (ref 1–3.3)
LYMPHOCYTES # BLD AUTO: 25.7 % — SIGNIFICANT CHANGE UP (ref 13–44)
MCHC RBC-ENTMCNC: 29.9 PG — SIGNIFICANT CHANGE UP (ref 27–34)
MCHC RBC-ENTMCNC: 33.3 GM/DL — SIGNIFICANT CHANGE UP (ref 32–36)
MCV RBC AUTO: 89.8 FL — SIGNIFICANT CHANGE UP (ref 80–100)
MONOCYTES # BLD AUTO: 0.65 K/UL — SIGNIFICANT CHANGE UP (ref 0–0.9)
MONOCYTES NFR BLD AUTO: 11.9 % — SIGNIFICANT CHANGE UP (ref 2–14)
NEUTROPHILS # BLD AUTO: 3.24 K/UL — SIGNIFICANT CHANGE UP (ref 1.8–7.4)
NEUTROPHILS # CSF: SIGNIFICANT CHANGE UP % (ref 0–6)
NEUTROPHILS NFR BLD AUTO: 59.6 % — SIGNIFICANT CHANGE UP (ref 43–77)
NITRITE UR-MCNC: NEGATIVE — SIGNIFICANT CHANGE UP
NRBC NFR CSF: 3 /UL — SIGNIFICANT CHANGE UP (ref 0–5)
PH UR: 5.5 — SIGNIFICANT CHANGE UP (ref 5–8)
PLATELET # BLD AUTO: 244 K/UL — SIGNIFICANT CHANGE UP (ref 150–400)
POTASSIUM SERPL-MCNC: 3.4 MMOL/L — LOW (ref 3.5–5.3)
POTASSIUM SERPL-SCNC: 3.4 MMOL/L — LOW (ref 3.5–5.3)
PROT CSF-MCNC: 32 MG/DL — SIGNIFICANT CHANGE UP (ref 15–45)
PROT SERPL-MCNC: 7.1 G/DL — SIGNIFICANT CHANGE UP (ref 6.6–8.7)
PROT UR-MCNC: 30 MG/DL
RAPID RVP RESULT: DETECTED
RBC # BLD: 4.01 M/UL — LOW (ref 4.2–5.8)
RBC # CSF: 29 /CMM — HIGH (ref 0–1)
RBC # FLD: 12.8 % — SIGNIFICANT CHANGE UP (ref 10.3–14.5)
RBC CASTS # UR COMP ASSIST: 1 /HPF — SIGNIFICANT CHANGE UP (ref 0–4)
RHEUMATOID FACT SERPL-ACNC: <10 IU/ML — SIGNIFICANT CHANGE UP (ref 0–13)
RV+EV RNA SPEC QL NAA+PROBE: DETECTED
SARS-COV-2 RNA SPEC QL NAA+PROBE: SIGNIFICANT CHANGE UP
SODIUM SERPL-SCNC: 135 MMOL/L — SIGNIFICANT CHANGE UP (ref 135–145)
SP GR SPEC: >1.03 — HIGH (ref 1–1.03)
SPECIMEN SOURCE: SIGNIFICANT CHANGE UP
SQUAMOUS # UR AUTO: 1 /HPF — SIGNIFICANT CHANGE UP (ref 0–5)
T3 SERPL-MCNC: 122 NG/DL — SIGNIFICANT CHANGE UP (ref 80–200)
T4 AB SER-ACNC: 6.4 UG/DL — SIGNIFICANT CHANGE UP (ref 4.5–12)
T4 FREE SERPL-MCNC: 1.3 NG/DL — SIGNIFICANT CHANGE UP (ref 0.9–1.8)
TSH SERPL-MCNC: 0.54 UIU/ML — SIGNIFICANT CHANGE UP (ref 0.27–4.2)
TUBE TYPE: SIGNIFICANT CHANGE UP
UROBILINOGEN FLD QL: 1 MG/DL — SIGNIFICANT CHANGE UP (ref 0.2–1)
VIT B12 SERPL-MCNC: 696 PG/ML — SIGNIFICANT CHANGE UP (ref 232–1245)
WBC # BLD: 5.44 K/UL — SIGNIFICANT CHANGE UP (ref 3.8–10.5)
WBC # FLD AUTO: 5.44 K/UL — SIGNIFICANT CHANGE UP (ref 3.8–10.5)
WBC UR QL: 2 /HPF — SIGNIFICANT CHANGE UP (ref 0–5)

## 2024-05-08 PROCEDURE — 99223 1ST HOSP IP/OBS HIGH 75: CPT

## 2024-05-08 PROCEDURE — 99223 1ST HOSP IP/OBS HIGH 75: CPT | Mod: 25

## 2024-05-08 PROCEDURE — 99497 ADVNCD CARE PLAN 30 MIN: CPT | Mod: 25

## 2024-05-08 PROCEDURE — 71045 X-RAY EXAM CHEST 1 VIEW: CPT | Mod: 26

## 2024-05-08 RX ORDER — OXYCODONE HYDROCHLORIDE 5 MG/1
1 TABLET ORAL
Refills: 0 | DISCHARGE

## 2024-05-08 RX ORDER — HEPARIN SODIUM 5000 [USP'U]/ML
5000 INJECTION INTRAVENOUS; SUBCUTANEOUS EVERY 12 HOURS
Refills: 0 | Status: DISCONTINUED | OUTPATIENT
Start: 2024-05-08 | End: 2024-05-13

## 2024-05-08 RX ORDER — BICTEGRAVIR SODIUM, EMTRICITABINE, AND TENOFOVIR ALAFENAMIDE FUMARATE 30; 120; 15 MG/1; MG/1; MG/1
1 TABLET ORAL DAILY
Refills: 0 | Status: DISCONTINUED | OUTPATIENT
Start: 2024-05-08 | End: 2024-05-13

## 2024-05-08 RX ORDER — IMMUNE GLOBULIN (HUMAN) 10 G/100ML
30 INJECTION INTRAVENOUS; SUBCUTANEOUS ONCE
Refills: 0 | Status: COMPLETED | OUTPATIENT
Start: 2024-05-08 | End: 2024-05-08

## 2024-05-08 RX ORDER — QUETIAPINE FUMARATE 200 MG/1
50 TABLET, FILM COATED ORAL AT BEDTIME
Refills: 0 | Status: DISCONTINUED | OUTPATIENT
Start: 2024-05-08 | End: 2024-05-13

## 2024-05-08 RX ORDER — LANOLIN ALCOHOL/MO/W.PET/CERES
3 CREAM (GRAM) TOPICAL AT BEDTIME
Refills: 0 | Status: DISCONTINUED | OUTPATIENT
Start: 2024-05-08 | End: 2024-05-13

## 2024-05-08 RX ORDER — POTASSIUM CHLORIDE 20 MEQ
20 PACKET (EA) ORAL
Refills: 0 | Status: DISCONTINUED | OUTPATIENT
Start: 2024-05-08 | End: 2024-05-13

## 2024-05-08 RX ORDER — ONDANSETRON 8 MG/1
4 TABLET, FILM COATED ORAL EVERY 8 HOURS
Refills: 0 | Status: DISCONTINUED | OUTPATIENT
Start: 2024-05-08 | End: 2024-05-13

## 2024-05-08 RX ORDER — MAGNESIUM OXIDE 400 MG ORAL TABLET 241.3 MG
400 TABLET ORAL DAILY
Refills: 0 | Status: DISCONTINUED | OUTPATIENT
Start: 2024-05-08 | End: 2024-05-10

## 2024-05-08 RX ORDER — INFLUENZA VIRUS VACCINE 15; 15; 15; 15 UG/.5ML; UG/.5ML; UG/.5ML; UG/.5ML
0.5 SUSPENSION INTRAMUSCULAR ONCE
Refills: 0 | Status: DISCONTINUED | OUTPATIENT
Start: 2024-05-08 | End: 2024-05-13

## 2024-05-08 RX ORDER — PREGABALIN 225 MG/1
1000 CAPSULE ORAL DAILY
Refills: 0 | Status: DISCONTINUED | OUTPATIENT
Start: 2024-05-08 | End: 2024-05-13

## 2024-05-08 RX ORDER — ACETAMINOPHEN 500 MG
650 TABLET ORAL EVERY 6 HOURS
Refills: 0 | Status: DISCONTINUED | OUTPATIENT
Start: 2024-05-08 | End: 2024-05-13

## 2024-05-08 RX ORDER — QUETIAPINE FUMARATE 200 MG/1
300 TABLET, FILM COATED ORAL AT BEDTIME
Refills: 0 | Status: DISCONTINUED | OUTPATIENT
Start: 2024-05-08 | End: 2024-05-13

## 2024-05-08 RX ORDER — DIAZEPAM 5 MG
5 TABLET ORAL ONCE
Refills: 0 | Status: DISCONTINUED | OUTPATIENT
Start: 2024-05-08 | End: 2024-05-10

## 2024-05-08 RX ADMIN — Medication 150 MILLIGRAM(S): at 17:27

## 2024-05-08 RX ADMIN — MAGNESIUM OXIDE 400 MG ORAL TABLET 400 MILLIGRAM(S): 241.3 TABLET ORAL at 12:53

## 2024-05-08 RX ADMIN — SODIUM CHLORIDE 1000 MILLILITER(S): 9 INJECTION INTRAMUSCULAR; INTRAVENOUS; SUBCUTANEOUS at 01:14

## 2024-05-08 RX ADMIN — Medication 975 MILLIGRAM(S): at 01:14

## 2024-05-08 RX ADMIN — BICTEGRAVIR SODIUM, EMTRICITABINE, AND TENOFOVIR ALAFENAMIDE FUMARATE 1 TABLET(S): 30; 120; 15 TABLET ORAL at 12:53

## 2024-05-08 RX ADMIN — PREGABALIN 1000 MICROGRAM(S): 225 CAPSULE ORAL at 12:53

## 2024-05-08 RX ADMIN — IMMUNE GLOBULIN (HUMAN) 47 GRAM(S): 10 INJECTION INTRAVENOUS; SUBCUTANEOUS at 05:36

## 2024-05-08 RX ADMIN — QUETIAPINE FUMARATE 300 MILLIGRAM(S): 200 TABLET, FILM COATED ORAL at 22:53

## 2024-05-08 RX ADMIN — Medication 20 MILLIEQUIVALENT(S): at 17:27

## 2024-05-08 RX ADMIN — Medication 650 MILLIGRAM(S): at 23:53

## 2024-05-08 RX ADMIN — Medication 650 MILLIGRAM(S): at 22:53

## 2024-05-08 RX ADMIN — QUETIAPINE FUMARATE 50 MILLIGRAM(S): 200 TABLET, FILM COATED ORAL at 22:54

## 2024-05-08 NOTE — H&P ADULT - CONVERSATION DETAILS
d/w pt his wishes regarding ACP. He joking states, doc if you have to ge your life to keep me going then do it. Specifically when asked, he would like to opt for CPR and/Or intubation AS indicted to ensure he remains alive as long as possible     Wishes for his mother to be his HCP Diana

## 2024-05-08 NOTE — ED ADULT NURSE NOTE - NSICDXPASTMEDICALHX_GEN_ALL_CORE_FT
PAST MEDICAL HISTORY:  Asthma     Chronic sinusitis     Closed fracture of multiple ribs of right side, initial encounter     Cocaine abuse     GBS (Guillain [place] syndrome)     HIV (human immunodeficiency virus infection) from birth    HIV disease     Homeless

## 2024-05-08 NOTE — ED ADULT NURSE NOTE - NS ED NURSE DISCH DISPOSITION
Admitted Hemigard Postcare Instructions: The HEMIGARD strips are to remain completely dry for at least 5-7 days.

## 2024-05-08 NOTE — H&P ADULT - HISTORY OF PRESENT ILLNESS
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 States he developed a cold a few days ago and since then has has had initially b/l foot and know thigh weakness b/l.   Reporyts havein developed a cough and upper URI symptoms a few days ago. Does not know if he had any fevers. No chills, rigors. No night sweats. Has had mildly productive coughs. No CP, SOB, headaches, LOC, seizures

## 2024-05-08 NOTE — ED ADULT NURSE NOTE - NSFALLUNIVINTERV_ED_ALL_ED
Bed/Stretcher in lowest position, wheels locked, appropriate side rails in place/Call bell, personal items and telephone in reach/Instruct patient to call for assistance before getting out of bed/chair/stretcher/Non-slip footwear applied when patient is off stretcher/[place] to call system/Physically safe environment - no spills, clutter or unnecessary equipment/Purposeful proactive rounding/Room/bathroom lighting operational, light cord in reach

## 2024-05-08 NOTE — H&P ADULT - NSICDXPASTMEDICALHX_GEN_ALL_CORE_FT
PAST MEDICAL HISTORY:  Asthma     Chronic sinusitis     Closed fracture of multiple ribs of right side, initial encounter     Cocaine abuse     GBS (Guillain [place] syndrome)     HIV (human immunodeficiency virus infection) from birth    HIV disease     Homeless

## 2024-05-08 NOTE — ED ADULT NURSE NOTE - OBJECTIVE STATEMENT
Patient presented to ED complaining of weakness, nasal congestion and headache for the past couple of days and today he felt to weak to work. Patient with a HX of cocaine abuse, +HIV, and homelessness. patient  AXOX4, RR even and unlabored. No acute distress noted.

## 2024-05-08 NOTE — ED ADULT NURSE NOTE - CHIEF COMPLAINT QUOTE
[place] d/t difficulty walking, pt has pain in his legs and unable to bend at the knee much. PMHx of neuropathy on gabapentin and lyrica, endorses taking them today. Pt is neurologically intact showing no deficits. BG 92

## 2024-05-08 NOTE — H&P ADULT - NSHPPHYSICALEXAM_GEN_ALL_CORE
Gen : Non toxic appearing, comfortable, NAD  HEENT : NCAT, MMM, No cervical lymphadenopathy, no JVD  CVS : S1S2, regular rate and rhythm, no murmurs  Resp : CTA b/l, no rhonchi or wheezes appreciated   Abd : Soft, non distended, non tender, BS +ve   MSK : No joint swelling or tenderness, no digital clubbing, no distal cyanosis  Neuro : CN II-XII intact, b/l LE 2/5 strength, sensations intact (hyperalgesic which reports is not new), DTR Knees +++. ankles +  Psych : Pleasant, no anxiety, normal affect

## 2024-05-08 NOTE — CONSULT NOTE ADULT - SUBJECTIVE AND OBJECTIVE BOX
[facility]                                     Neurology at [place]                                 Jolanta Marte, & Rene                                  [address]                                        [place], [place], [address]                                             [phone]    CC:leg weakness  HPI:  The patient is a 34y Male who presented with bilateral leg weakness of acute onset for 1-2 days prior to admission.  He has baseline pain in legs and uses gabapentin.  He laso has history of GBS in 2018 after flu vaccine.  This coincides with cough and URI symptoms.    He also carries diagnosis of HIV since birth and is on Biktarvy.  Neurology is asked to evaluate    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      Asthma      Closed fracture of multiple ribs of right side, initial encounter      Cocaine abuse      Chronic sinusitis      Homeless      GBS (Guillain Raymond syndrome)      HIV disease      History of orthopedic surgery  left arm      MEDICATIONS  (STANDING):  bictegravir 50 mG/emtricitabine 200 mG/tenofovir alafenamide 25 mG (BIKTARVY) 1 Tablet(s) Oral daily  cyanocobalamin 1000 MICROGram(s) Oral daily  heparin   Injectable 5000 Unit(s) SubCutaneous every 12 hours  influenza   Vaccine 0.5 milliLiter(s) IntraMuscular once  magnesium oxide 400 milliGRAM(s) Oral daily  potassium chloride    Tablet ER 20 milliEquivalent(s) Oral two times a day  pregabalin 150 milliGRAM(s) Oral two times a day  QUEtiapine 50 milliGRAM(s) Oral at bedtime  QUEtiapine 300 milliGRAM(s) Oral at bedtime  trimethoprim  160 mG/sulfamethoxazole 800 mG 1 Tablet(s) Oral <User Schedule>    MEDICATIONS  (PRN):  acetaminophen     Tablet .. 650 milliGRAM(s) Oral every 6 hours PRN Temp greater or equal to 38C (100.4F), Mild Pain (1 - 3)  aluminum hydroxide/magnesium hydroxide/simethicone Suspension 30 milliLiter(s) Oral every 4 hours PRN Dyspepsia  melatonin 3 milliGRAM(s) Oral at bedtime PRN Insomnia  ondansetron Injectable 4 milliGRAM(s) IV Push every 8 hours PRN Nausea and/or Vomiting      Allergies    Ceclor (Unknown)  Toradol (Swelling)  Toradol (Anaphylaxis; Swelling)    Intolerances      SOCIAL HISTORY:  no tob,   no alcohol   no drugs    FAMILY HISTORY:  FH: HIV infection (Mother)          ROS: 14 point ROS negative other than what is present in HPI or below    Vital Signs Last 24 Hrs  T(C): 36.7 (08 May 2024 11:24), Max: 36.9 (07 May 2024 21:52)  T(F): 98.1 (08 May 2024 11:24), Max: 98.4 (07 May 2024 21:52)  HR: 89 (08 May 2024 11:24) (77 - 103)  BP: 133/80 (08 May 2024 11:24) (114/69 - 146/95)  BP(mean): --  RR: 19 (08 May 2024 11:24) (18 - 19)  SpO2: 95% (08 May 2024 11:24) (95% - 100%)    Parameters below as of 08 May 2024 11:24  Patient On (Oxygen Delivery Method): room air      General: NAD    Detailed Neurologic Exam:    Mental status: The patient is awake and alert and has normal attention span.  The patient is fully oriented in 3 spheres. The patient is oriented to current events. The patient is able to name objects, follow commands, repeat sentences.    Cranial nerves: Pupils equal and react symmetrically to light. There is no visual field deficit to confrontation. Extraocular motion is full with no nystagmus. There is no ptosis. Facial sensation is intact. Facial musculature is symmetric. Palate elevates symmetrically. Tongue is midline.    Motor: There is normal bulk and tone.  There is no tremor.  Strength is 5/5 in the right arm and 3/5 proximal, 2/5 distal leg.   Strength is 5/5 in the left arm and 3/5 proximal, 2/5 distal leg.  there is an element of pain limitation to leg movement    Sensation: Intact to light touch and pin in 4 extremities    Reflexes: 2+ biceps/BR/patellar DTR and plantar responses are flexor.    Cerebellar: There is no dysmetria on finger to nose testing.    Gait : deferred    LABS:                         12.0   5.44  )-----------( 244      ( 08 May 2024 01:00 )             36.0       05-    135  |  96  |  15.2  ----------------------------<  71  3.4<L>   |  22.0  |  0.71    Ca    8.8      08 May 2024 01:00    TPro  x   /  Alb  3305<L>  /  TBili  x   /  DBili  x   /  AST  x   /  ALT  x   /  AlkPhos  x   05-08    Cerebrospinal Fluid Cell Count-1 (24 @ 04:46)   CSF Color: No Color  CSF Appearance: Clear  CSF Neutrophils: N/A: WBC count is <5 therefore no differential performed. %  Tube Type: Tube 4  RBC Count - Spinal Fluid: 29 /cmm  Total Nucleated Cell Count, CSF: 3 /uL  Protein, CSF (24 @ 04:46)   Protein, CSF: 32 mg/dL  Glucose, CSF (24 @ 04:46)   Glucose, CSF: 55 mg/dLG/24h  CSF IgG Index (24 @ 04:46)   Albumin: 3305 mg/dL  Quantitative Ig mg/dL  IgG/Albumin Ratio, Serum: 0.29 Ratio  IgG CSF: 5.4 mg/dL  CSF ALBU: 22.6 mg/dL  IgG/Albumin Ratio, CSF: 0.24 Ratio  IgG Index: 0.8  IgG Synthesis: 8.9 mg/dayCulture - CSF with Gram Stain . (24 @ 04:46)   Gram Stain:   No polymorphonuclear leukocytes   No organisms seen   VDRL Titer, CSF (21 @ 15:52)   VDRL Titer, CSF: Nonreact:   Lyme Antibodies CSF [place] (21 @ 15:52)   Lyme Antibodies CSF [place]: Nonreact:  CSF PCR Panel (21 @ 15:52)   CSF PCR Result: NotDetec:       RADIOLOGY & ADDITIONAL STUDIES (independently reviewed unless otherwise noted):  pending

## 2024-05-08 NOTE — ED ADULT NURSE REASSESSMENT NOTE - NS ED NURSE REASSESS COMMENT FT1
Assumed care of pt from BENEDICTO Land RN at 0600. Pt is resting comfortably in stretcher. NAD. Pt is A&Ox4. Respirations are even and unlabored. Color is appropriate for race. Pt awaiting bed placement. Pt receiving IVIG at this time. Pt updated on plan of care.

## 2024-05-08 NOTE — ED PROCEDURE NOTE - ATTENDING CONTRIBUTION TO CARE
Dr. Gaitan: I personally supervised this procedure.
Dr. Gaitan: I personally supervised this procedure.

## 2024-05-08 NOTE — CONSULT NOTE ADULT - ASSESSMENT
The patient is a 34y Male who is followed by neurology because of bilateral leg weakness    Leg weakness  Possible recurrence of GBS symptoms in setting of viral infection  CSF not consistent with GBS  agree with MRI brain, thoracic and lumbar  spines as ordered  possible central demyelinating condition such as transverse myelitis or ADEM  PT as tolerated    discussed with Dr Quach    will follow with you    Karri Stover MD PhD   [phone]

## 2024-05-08 NOTE — H&P ADULT - ASSESSMENT
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 Despite pt reporting that his HIV is excellently controlled he has been prescribed Bactrim at [facility] ppx doses as well as Fluc suggestive of rect oesophageal thrush    Admit to Medicine   Abrupt onset of LE weakness with new Enteroviral infection   (Broad differential including HIV induced distal symmetric polyneuropathy)  h/o GBS post influenza vaccination   LP done, no Albuminocytologic dissociation (acute GBS less liekly)  CSF Protein : 32 / PMNs 3  Glucose 55  Send CPK/ESR/TFTs/RPR/B12/Folate/Rheum factor/   Continue Lyrica 100 mg bid   MRI Brain w/wo con as well as TLS spine  Will d/w Neuro       HIV   Send CD4   HIV VL  Continue Biktarvy  Continue Bactrim DS M/W?F  Continue Fluconazole 400 mg daily       DVT ppx : Heparin s/c      35 yo male with 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 Despite pt reporting that his HIV is excellently controlled he has been prescribed Bactrim at [facility] ppx doses as well as Fluc suggestive of rect oesophageal thrush    Admit to Medicine   Abrupt onset of LE weakness with new Enteroviral infection   (Broad differential including HIV induced distal symmetric polyneuropathy / ? recrudescence in the setting of acute enteroviral infection)  h/o GBS post influenza vaccination   LP done, no Albuminocytologic dissociation (acute GBS less liekly)  CSF Protein : 32 / PMNs 3  Glucose 55  Send CPK/ESR/TFTs/RPR/B12/Folate/Rheum factor/   Continue Lyrica 100 mg bid   MRI Brain w/wo con as well as TLS spine  Will d/w Neuro       HIV   Send CD4   HIV VL  Continue Biktarvy  Has been off Bactrim DS M/W/F since 2023. Suggestive of imrpovement in viremic control   f/u CD4 ratio / %.       DVT ppx : Heparin s/c

## 2024-05-08 NOTE — PATIENT PROFILE ADULT - FALL HARM RISK - HARM RISK INTERVENTIONS

## 2024-05-09 LAB
4/8 RATIO: 0.36 RATIO — LOW (ref 0.9–3.6)
ABS CD8: 554 CELLS/UL — SIGNIFICANT CHANGE UP (ref 142–740)
ALBUMIN SERPL ELPH-MCNC: 3.5 G/DL — SIGNIFICANT CHANGE UP (ref 3.3–5.2)
ALP SERPL-CCNC: 77 U/L — SIGNIFICANT CHANGE UP (ref 40–120)
ALT FLD-CCNC: 11 U/L — SIGNIFICANT CHANGE UP
ANION GAP SERPL CALC-SCNC: 12 MMOL/L — SIGNIFICANT CHANGE UP (ref 5–17)
AST SERPL-CCNC: 16 U/L — SIGNIFICANT CHANGE UP
BASOPHILS # BLD AUTO: 0.01 K/UL — SIGNIFICANT CHANGE UP (ref 0–0.2)
BASOPHILS NFR BLD AUTO: 0.3 % — SIGNIFICANT CHANGE UP (ref 0–2)
BILIRUB SERPL-MCNC: 0.3 MG/DL — LOW (ref 0.4–2)
BUN SERPL-MCNC: 6 MG/DL — LOW (ref 8–20)
CALCIUM SERPL-MCNC: 8.3 MG/DL — LOW (ref 8.4–10.5)
CD3 BLASTS SPEC-ACNC: 76 % — SIGNIFICANT CHANGE UP (ref 59–83)
CD3 BLASTS SPEC-ACNC: 787 CELLS/UL — SIGNIFICANT CHANGE UP (ref 672–1870)
CD4 %: 19 % — LOW (ref 30–62)
CD8 %: 53 % — HIGH (ref 12–36)
CHLORIDE SERPL-SCNC: 103 MMOL/L — SIGNIFICANT CHANGE UP (ref 96–108)
CO2 SERPL-SCNC: 24 MMOL/L — SIGNIFICANT CHANGE UP (ref 22–29)
CREAT SERPL-MCNC: 0.65 MG/DL — SIGNIFICANT CHANGE UP (ref 0.5–1.3)
CULTURE RESULTS: SIGNIFICANT CHANGE UP
EGFR: 126 ML/MIN/1.73M2 — SIGNIFICANT CHANGE UP
EOSINOPHIL # BLD AUTO: 0.11 K/UL — SIGNIFICANT CHANGE UP (ref 0–0.5)
EOSINOPHIL NFR BLD AUTO: 3.4 % — SIGNIFICANT CHANGE UP (ref 0–6)
FOLATE RBC-MCNC: 1497 NG/ML — SIGNIFICANT CHANGE UP (ref 499–1504)
GLUCOSE SERPL-MCNC: 91 MG/DL — SIGNIFICANT CHANGE UP (ref 70–99)
HCT VFR BLD CALC: 35.8 % — LOW (ref 39–50)
HGB BLD-MCNC: 11.6 G/DL — LOW (ref 13–17)
HIV 1+2 AB+HIV1 P24 AG SERPL QL IA: REACTIVE
HIV-1 VIRAL LOAD RESULT: ABNORMAL
HIV1 RNA # SERPL NAA+PROBE: SIGNIFICANT CHANGE UP
HIV1 RNA SER-IMP: SIGNIFICANT CHANGE UP
HIV1 RNA SERPL NAA+PROBE-ACNC: ABNORMAL
HIV1 RNA SERPL NAA+PROBE-LOG#: 3.42 — SIGNIFICANT CHANGE UP
HIV1+2 AB SPEC QL: ABNORMAL
HIV1+2 AB SPEC QL: SIGNIFICANT CHANGE UP
IMM GRANULOCYTES NFR BLD AUTO: 0.3 % — SIGNIFICANT CHANGE UP (ref 0–0.9)
LYMPHOCYTES # BLD AUTO: 1.56 K/UL — SIGNIFICANT CHANGE UP (ref 1–3.3)
LYMPHOCYTES # BLD AUTO: 48 % — HIGH (ref 13–44)
MAGNESIUM SERPL-MCNC: 1.4 MG/DL — LOW (ref 1.6–2.6)
MCHC RBC-ENTMCNC: 29.2 PG — SIGNIFICANT CHANGE UP (ref 27–34)
MCHC RBC-ENTMCNC: 32.4 GM/DL — SIGNIFICANT CHANGE UP (ref 32–36)
MCV RBC AUTO: 90.2 FL — SIGNIFICANT CHANGE UP (ref 80–100)
MONOCYTES # BLD AUTO: 0.61 K/UL — SIGNIFICANT CHANGE UP (ref 0–0.9)
MONOCYTES NFR BLD AUTO: 18.8 % — HIGH (ref 2–14)
NEUTROPHILS # BLD AUTO: 0.95 K/UL — LOW (ref 1.8–7.4)
NEUTROPHILS NFR BLD AUTO: 29.2 % — LOW (ref 43–77)
PLATELET # BLD AUTO: 228 K/UL — SIGNIFICANT CHANGE UP (ref 150–400)
POTASSIUM SERPL-MCNC: 3.4 MMOL/L — LOW (ref 3.5–5.3)
POTASSIUM SERPL-SCNC: 3.4 MMOL/L — LOW (ref 3.5–5.3)
PROT SERPL-MCNC: 6.7 G/DL — SIGNIFICANT CHANGE UP (ref 6.6–8.7)
RBC # BLD: 3.97 M/UL — LOW (ref 4.2–5.8)
RBC # FLD: 12.9 % — SIGNIFICANT CHANGE UP (ref 10.3–14.5)
SODIUM SERPL-SCNC: 139 MMOL/L — SIGNIFICANT CHANGE UP (ref 135–145)
SPECIMEN SOURCE: SIGNIFICANT CHANGE UP
T PALLIDUM AB TITR SER: NEGATIVE — SIGNIFICANT CHANGE UP
T-CELL CD4 SUBSET PNL BLD: 199 CELLS/UL — LOW (ref 489–1457)
WBC # BLD: 3.25 K/UL — LOW (ref 3.8–10.5)
WBC # FLD AUTO: 3.25 K/UL — LOW (ref 3.8–10.5)

## 2024-05-09 PROCEDURE — 99232 SBSQ HOSP IP/OBS MODERATE 35: CPT

## 2024-05-09 PROCEDURE — 99233 SBSQ HOSP IP/OBS HIGH 50: CPT

## 2024-05-09 RX ORDER — MAGNESIUM SULFATE 500 MG/ML
2 VIAL (ML) INJECTION ONCE
Refills: 0 | Status: COMPLETED | OUTPATIENT
Start: 2024-05-09 | End: 2024-05-09

## 2024-05-09 RX ADMIN — QUETIAPINE FUMARATE 50 MILLIGRAM(S): 200 TABLET, FILM COATED ORAL at 23:08

## 2024-05-09 RX ADMIN — Medication 20 MILLIEQUIVALENT(S): at 20:28

## 2024-05-09 RX ADMIN — MAGNESIUM OXIDE 400 MG ORAL TABLET 400 MILLIGRAM(S): 241.3 TABLET ORAL at 08:50

## 2024-05-09 RX ADMIN — Medication 150 MILLIGRAM(S): at 20:28

## 2024-05-09 RX ADMIN — Medication 20 MILLIEQUIVALENT(S): at 08:46

## 2024-05-09 RX ADMIN — PREGABALIN 1000 MICROGRAM(S): 225 CAPSULE ORAL at 08:48

## 2024-05-09 RX ADMIN — HEPARIN SODIUM 5000 UNIT(S): 5000 INJECTION INTRAVENOUS; SUBCUTANEOUS at 08:47

## 2024-05-09 RX ADMIN — BICTEGRAVIR SODIUM, EMTRICITABINE, AND TENOFOVIR ALAFENAMIDE FUMARATE 1 TABLET(S): 30; 120; 15 TABLET ORAL at 08:48

## 2024-05-09 RX ADMIN — QUETIAPINE FUMARATE 300 MILLIGRAM(S): 200 TABLET, FILM COATED ORAL at 23:08

## 2024-05-09 RX ADMIN — Medication 150 MILLIGRAM(S): at 08:47

## 2024-05-09 NOTE — PROGRESS NOTE ADULT - ASSESSMENT
The patient is a 34y Male who is followed by neurology because of bilateral leg weakness    Leg weakness  His strength is a bit better.  Possible recurrence of GBS symptoms in setting of viral infection  CSF not consistent with GBS  Await MRI brain, thoracic and lumbar  spines as ordered  possible central demyelinating condition such as transverse myelitis or ADEM  PT as tolerated    will follow with you    Karri Stover MD PhD   [phone]

## 2024-05-09 NOTE — PROGRESS NOTE ADULT - SUBJECTIVE AND OBJECTIVE BOX
[facility]                                     Neurology at [place]                                 Jolanta Marte, & Rene                                  [address]                                        [place], [place], [address]                                             [phone]    CC:   leg weakness  HPI:  The patient is a 34y Male who presented with bilateral leg weakness of acute onset for 1-2 days prior to admission.  He has baseline pain in legs and uses gabapentin.  He also has history of GBS in 2018 after flu vaccine.  This coincides with cough and URI symptoms.    He also carries diagnosis of HIV since birth and is on Biktarvy.  Neurology is asked to evaluate.    Interval history:  legs moving a bit better    Review of systems (neurology): Denies headache or dizziness. Denies weakness/numbness.  Denies speech/language deficits. Denies diplopia/blurred vision.  Denies confusion    MEDICATIONS  (STANDING):  bictegravir 50 mG/emtricitabine 200 mG/tenofovir alafenamide 25 mG (BIKTARVY) 1 Tablet(s) Oral daily  cyanocobalamin 1000 MICROGram(s) Oral daily  heparin   Injectable 5000 Unit(s) SubCutaneous every 12 hours  influenza   Vaccine 0.5 milliLiter(s) IntraMuscular once  magnesium oxide 400 milliGRAM(s) Oral daily  magnesium sulfate  IVPB 2 Gram(s) IV Intermittent once  potassium chloride    Tablet ER 20 milliEquivalent(s) Oral two times a day  pregabalin 150 milliGRAM(s) Oral two times a day  QUEtiapine 50 milliGRAM(s) Oral at bedtime  QUEtiapine 300 milliGRAM(s) Oral at bedtime  trimethoprim  160 mG/sulfamethoxazole 800 mG 1 Tablet(s) Oral <User Schedule>    MEDICATIONS  (PRN):  acetaminophen     Tablet .. 650 milliGRAM(s) Oral every 6 hours PRN Temp greater or equal to 38C (100.4F), Mild Pain (1 - 3)  aluminum hydroxide/magnesium hydroxide/simethicone Suspension 30 milliLiter(s) Oral every 4 hours PRN Dyspepsia  diazepam    Tablet 5 milliGRAM(s) Oral once PRN Please give 4 hours prior to MRI  LORazepam     Tablet 1 milliGRAM(s) Oral daily PRN Anxiety'  melatonin 3 milliGRAM(s) Oral at bedtime PRN Insomnia  ondansetron Injectable 4 milliGRAM(s) IV Push every 8 hours PRN Nausea and/or Vomiting      Vital Signs Last 24 Hrs  T(C): 36.6 (09 May 2024 11:10), Max: 36.8 (08 May 2024 20:05)  T(F): 97.8 (09 May 2024 11:10), Max: 98.2 (08 May 2024 20:05)  HR: 64 (09 May 2024 11:10) (60 - 99)  BP: 104/66 (09 May 2024 11:10) (104/66 - 125/72)  BP(mean): --  RR: 18 (09 May 2024 11:10) (18 - 19)  SpO2: 98% (09 May 2024 11:10) (96% - 98%)    Parameters below as of 09 May 2024 11:10  Patient On (Oxygen Delivery Method): room air    Detailed Neurologic Exam:    Mental status: The patient is awake and alert and has normal attention span.  The patient is fully oriented in 3 spheres. The patient is oriented to current events. The patient is able to name objects, follow commands, repeat sentences.    Cranial nerves: Pupils equal and react symmetrically to light. There is no visual field deficit to confrontation. Extraocular motion is full with no nystagmus. There is no ptosis. Facial sensation is intact. Facial musculature is symmetric. Palate elevates symmetrically. Tongue is midline.    Motor: There is normal bulk and tone.  There is no tremor.  Strength is 5/5 in the right arm and 3 to 4-/5 proximal, 2-3/5 distal leg.   Strength is 5/5 in the left arm and 3 to 4-/5 proximal, 2-3/5 distal leg.  there remains an element of pain limitation to leg movement    Sensation: Intact to light touch and pin in 4 extremities    Reflexes: 2+ biceps/BR/ 1-2 + patellar DTR and plantar responses are flexor.    Cerebellar: There is no dysmetria on finger to nose testing.    Gait : deferred    LABS:                                    11.6   3.25  )-----------( 228      ( 09 May 2024 03:20 )             35.8     05-    139  |  103  |  6.0<L>  ----------------------------<  91  3.4<L>   |  24.0  |  0.65    Ca    8.3<L>      09 May 2024 03:20  Mg     1.4         TPro  6.7  /  Alb  3.5  /  TBili  0.3<L>  /  DBili  x   /  AST  16  /  ALT  11  /  AlkPhos  77      LIVER FUNCTIONS - ( 09 May 2024 03:20 )  Alb: 3.5 g/dL / Pro: 6.7 g/dL / ALK PHOS: 77 U/L / ALT: 11 U/L / AST: 16 U/L / GGT: x         Cerebrospinal Fluid Cell Count-1 (24 @ 04:46)   CSF Color: No Color  CSF Appearance: Clear  CSF Neutrophils: N/A: WBC count is <5 therefore no differential performed. %  Tube Type: Tube 4  RBC Count - Spinal Fluid: 29 /cmm  Total Nucleated Cell Count, CSF: 3 /uL  Protein, CSF (24 @ 04:46)   Protein, CSF: 32 mg/dL  Glucose, CSF (24 @ 04:46)   Glucose, CSF: 55 mg/dLG/24h  CSF IgG Index (24 @ 04:46)   Albumin: 3305 mg/dL  Quantitative Ig mg/dL  IgG/Albumin Ratio, Serum: 0.29 Ratio  IgG CSF: 5.4 mg/dL  CSF ALBU: 22.6 mg/dL  IgG/Albumin Ratio, CSF: 0.24 Ratio  IgG Index: 0.8  IgG Synthesis: 8.9 mg/dayCulture - CSF with Gram Stain . (24 @ 04:46)   Gram Stain:   No polymorphonuclear leukocytes   No organisms seen   VDRL Titer, CSF (21 @ 15:52)   VDRL Titer, CSF: Nonreact:   Lyme Antibodies CSF [place] (21 @ 15:52)   Lyme Antibodies CSF [place]: Nonreact:  CSF PCR Panel (21 @ 15:52)   CSF PCR Result: NotDetec:       RADIOLOGY & ADDITIONAL STUDIES (independently reviewed unless otherwise noted):  pending

## 2024-05-09 NOTE — PROGRESS NOTE ADULT - SUBJECTIVE AND OBJECTIVE BOX
Patient is a 34y old  Male who presents with a chief complaint of     Patient seen and examined at bedside. No overnight events reported.     ALLERGIES:  Ceclor (Unknown)  Toradol (Swelling)  Toradol (Anaphylaxis; Swelling)    MEDICATIONS  (STANDING):  bictegravir 50 mG/emtricitabine 200 mG/tenofovir alafenamide 25 mG (BIKTARVY) 1 Tablet(s) Oral daily  cyanocobalamin 1000 MICROGram(s) Oral daily  heparin   Injectable 5000 Unit(s) SubCutaneous every 12 hours  influenza   Vaccine 0.5 milliLiter(s) IntraMuscular once  magnesium oxide 400 milliGRAM(s) Oral daily  magnesium sulfate  IVPB 2 Gram(s) IV Intermittent once  potassium chloride    Tablet ER 20 milliEquivalent(s) Oral two times a day  pregabalin 150 milliGRAM(s) Oral two times a day  QUEtiapine 50 milliGRAM(s) Oral at bedtime  QUEtiapine 300 milliGRAM(s) Oral at bedtime  trimethoprim  160 mG/sulfamethoxazole 800 mG 1 Tablet(s) Oral <User Schedule>    MEDICATIONS  (PRN):  acetaminophen     Tablet .. 650 milliGRAM(s) Oral every 6 hours PRN Temp greater or equal to 38C (100.4F), Mild Pain (1 - 3)  aluminum hydroxide/magnesium hydroxide/simethicone Suspension 30 milliLiter(s) Oral every 4 hours PRN Dyspepsia  diazepam    Tablet 5 milliGRAM(s) Oral once PRN Please give 4 hours prior to MRI  LORazepam     Tablet 1 milliGRAM(s) Oral daily PRN Anxiety'  melatonin 3 milliGRAM(s) Oral at bedtime PRN Insomnia  ondansetron Injectable 4 milliGRAM(s) IV Push every 8 hours PRN Nausea and/or Vomiting    Vital Signs Last 24 Hrs  T(F): 98.6 (09 May 2024 19:27), Max: 99.5 (09 May 2024 15:35)  HR: 92 (09 May 2024 19:27) (60 - 92)  BP: 126/79 (09 May 2024 19:27) (104/66 - 126/79)  RR: 18 (09 May 2024 19:27) (18 - 19)  SpO2: 98% (09 May 2024 19:27) (96% - 98%)  I&O's Summary    PHYSICAL EXAM:  General: NAD, A/O x 3  ENT: MMM, no thrush  Neck: Supple, No JVD  Lungs: Clear to auscultation bilaterally, good air entry, non-labored breathing  Cardio: RRR, S1/S2, No murmur  Abdomen: Soft, Nontender, Nondistended; Bowel sounds present  Extremities: No calf tenderness, No pitting edema    LABS:                        11.6   3.25  )-----------( 228      ( 09 May 2024 03:20 )             35.8     05-09    139  |  103  |  6.0  ----------------------------<  91  3.4   |  24.0  |  0.65    Ca    8.3      09 May 2024 03:20  Mg     1.4     05-09    TPro  6.7  /  Alb  3.5  /  TBili  0.3  /  DBili  x   /  AST  16  /  ALT  11  /  AlkPhos  77  05-09                CARDIAC MARKERS ( 08 May 2024 11:35 )  x     / x     / 174 U/L / x     / 1.4 ng/mL        TSH 0.54   TSH with FT4 reflex --  Total T3 122            Urinalysis Basic - ( 09 May 2024 03:20 )    Color: x / Appearance: x / SG: x / pH: x  Gluc: 91 mg/dL / Ketone: x  / Bili: x / Urobili: x   Blood: x / Protein: x / Nitrite: x   Leuk Esterase: x / RBC: x / WBC x   Sq Epi: x / Non Sq Epi: x / Bacteria: x        Culture - CSF with Gram Stain (collected 08 May 2024 04:46)  Source: .CSF CSF.lumbar  Gram Stain (08 May 2024 09:23):    No polymorphonuclear leukocytes    No organisms seen    by cytocentrifuge  Preliminary Report (09 May 2024 06:52):    No growth    Culture - Urine (collected 08 May 2024 03:52)  Source: Clean Catch Clean Catch (Midstream)  Final Report (09 May 2024 16:19):    <10,000 CFU/mL Normal Urogenital [place]        RADIOLOGY & ADDITIONAL TESTS:    Care Discussed with Consultants/Other Providers:

## 2024-05-09 NOTE — PROGRESS NOTE ADULT - ASSESSMENT
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 Despite pt reporting that his HIV is excellently controlled he has been prescribed Bactrim at [facility] ppx doses as well as Fluc suggestive of rect oesophageal thrush    new onset of LE weakness with new Enteroviral infection   (Broad differential including HIV induced distal symmetric polyneuropathy / ? recrudescence in the setting of acute enteroviral infection)  h/o GBS post influenza vaccination   LP done, no Albuminocytologic dissociation , less consistent with gbs   CSF Protein : 32 / PMNs 3  Glucose 55  Send CPK/ESR/TFTs/RPR/B12/Folate/Rheum factor/   Continue Lyrica 100 mg bid   MRI Brain w/wo con as well as TLS spine pending   Neuro consulted       HIV   Send CD4   HIV VL  Continue Biktarvy  Has been off Bactrim DS M/W/F since 2023. Suggestive of improvement in viremic control   f/u CD4 ratio / %.       DVT ppx : Heparin s/c

## 2024-05-10 LAB
ALBUMIN SERPL ELPH-MCNC: 3.5 G/DL — SIGNIFICANT CHANGE UP (ref 3.3–5.2)
ALP SERPL-CCNC: 83 U/L — SIGNIFICANT CHANGE UP (ref 40–120)
ALT FLD-CCNC: 11 U/L — SIGNIFICANT CHANGE UP
ANION GAP SERPL CALC-SCNC: 14 MMOL/L — SIGNIFICANT CHANGE UP (ref 5–17)
AST SERPL-CCNC: 16 U/L — SIGNIFICANT CHANGE UP
BILIRUB SERPL-MCNC: 0.3 MG/DL — LOW (ref 0.4–2)
BUN SERPL-MCNC: 6 MG/DL — LOW (ref 8–20)
CALCIUM SERPL-MCNC: 8.1 MG/DL — LOW (ref 8.4–10.5)
CHLORIDE SERPL-SCNC: 103 MMOL/L — SIGNIFICANT CHANGE UP (ref 96–108)
CO2 SERPL-SCNC: 22 MMOL/L — SIGNIFICANT CHANGE UP (ref 22–29)
CREAT SERPL-MCNC: 0.72 MG/DL — SIGNIFICANT CHANGE UP (ref 0.5–1.3)
EGFR: 122 ML/MIN/1.73M2 — SIGNIFICANT CHANGE UP
GLUCOSE SERPL-MCNC: 99 MG/DL — SIGNIFICANT CHANGE UP (ref 70–99)
HCT VFR BLD CALC: 36.3 % — LOW (ref 39–50)
HGB BLD-MCNC: 11.8 G/DL — LOW (ref 13–17)
MAGNESIUM SERPL-MCNC: 1.5 MG/DL — LOW (ref 1.6–2.6)
MCHC RBC-ENTMCNC: 29.9 PG — SIGNIFICANT CHANGE UP (ref 27–34)
MCHC RBC-ENTMCNC: 32.5 GM/DL — SIGNIFICANT CHANGE UP (ref 32–36)
MCV RBC AUTO: 91.9 FL — SIGNIFICANT CHANGE UP (ref 80–100)
PLATELET # BLD AUTO: 227 K/UL — SIGNIFICANT CHANGE UP (ref 150–400)
POTASSIUM SERPL-MCNC: 3.4 MMOL/L — LOW (ref 3.5–5.3)
POTASSIUM SERPL-SCNC: 3.4 MMOL/L — LOW (ref 3.5–5.3)
PROT SERPL-MCNC: 6.5 G/DL — LOW (ref 6.6–8.7)
RBC # BLD: 3.95 M/UL — LOW (ref 4.2–5.8)
RBC # FLD: 13.1 % — SIGNIFICANT CHANGE UP (ref 10.3–14.5)
SODIUM SERPL-SCNC: 139 MMOL/L — SIGNIFICANT CHANGE UP (ref 135–145)
WBC # BLD: 3.93 K/UL — SIGNIFICANT CHANGE UP (ref 3.8–10.5)
WBC # FLD AUTO: 3.93 K/UL — SIGNIFICANT CHANGE UP (ref 3.8–10.5)

## 2024-05-10 PROCEDURE — 99233 SBSQ HOSP IP/OBS HIGH 50: CPT

## 2024-05-10 PROCEDURE — 72148 MRI LUMBAR SPINE W/O DYE: CPT | Mod: 26

## 2024-05-10 PROCEDURE — 70551 MRI BRAIN STEM W/O DYE: CPT | Mod: 26

## 2024-05-10 RX ORDER — POTASSIUM CHLORIDE 20 MEQ
20 PACKET (EA) ORAL
Refills: 0 | Status: COMPLETED | OUTPATIENT
Start: 2024-05-10 | End: 2024-05-10

## 2024-05-10 RX ORDER — MAGNESIUM OXIDE 400 MG ORAL TABLET 241.3 MG
400 TABLET ORAL
Refills: 0 | Status: COMPLETED | OUTPATIENT
Start: 2024-05-10 | End: 2024-05-12

## 2024-05-10 RX ORDER — MAGNESIUM SULFATE 500 MG/ML
2 VIAL (ML) INJECTION ONCE
Refills: 0 | Status: COMPLETED | OUTPATIENT
Start: 2024-05-10 | End: 2024-05-10

## 2024-05-10 RX ADMIN — Medication 5 MILLIGRAM(S): at 05:52

## 2024-05-10 RX ADMIN — QUETIAPINE FUMARATE 50 MILLIGRAM(S): 200 TABLET, FILM COATED ORAL at 22:16

## 2024-05-10 RX ADMIN — Medication 20 MILLIEQUIVALENT(S): at 17:40

## 2024-05-10 RX ADMIN — Medication 150 MILLIGRAM(S): at 17:40

## 2024-05-10 RX ADMIN — BICTEGRAVIR SODIUM, EMTRICITABINE, AND TENOFOVIR ALAFENAMIDE FUMARATE 1 TABLET(S): 30; 120; 15 TABLET ORAL at 12:56

## 2024-05-10 RX ADMIN — Medication 20 MILLIEQUIVALENT(S): at 13:27

## 2024-05-10 RX ADMIN — Medication 150 MILLIGRAM(S): at 05:53

## 2024-05-10 RX ADMIN — Medication 20 MILLIEQUIVALENT(S): at 05:52

## 2024-05-10 RX ADMIN — QUETIAPINE FUMARATE 300 MILLIGRAM(S): 200 TABLET, FILM COATED ORAL at 22:16

## 2024-05-10 RX ADMIN — MAGNESIUM OXIDE 400 MG ORAL TABLET 400 MILLIGRAM(S): 241.3 TABLET ORAL at 17:40

## 2024-05-10 RX ADMIN — PREGABALIN 1000 MICROGRAM(S): 225 CAPSULE ORAL at 12:57

## 2024-05-10 RX ADMIN — MAGNESIUM OXIDE 400 MG ORAL TABLET 400 MILLIGRAM(S): 241.3 TABLET ORAL at 13:27

## 2024-05-10 NOTE — PROGRESS NOTE ADULT - ASSESSMENT
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 Despite pt reporting that his HIV is excellently controlled he has been prescribed Bactrim at [facility] ppx doses as well as Fluc suggestive of rect oesophageal thrush    >new onset of LE weakness with new Enteroviral infection   (Broad differential including HIV induced distal symmetric polyneuropathy / ? recrudescence in the setting of acute enteroviral infection)  h/o GBS post influenza vaccination   LP done, no Albuminocytologic dissociation , less consistent with gbs , CSF Protein : 32 / PMNs 3, Glucose 55  f/u CPK/ESR/TFTs/RPR/B12/Folate/Rheum factor/   Continue Lyrica 100 mg bid   neuro consulted   MRI brain and lumbar spine < from: MR Head No Cont (05.10.24 @ 07:59) >No evidence of a mass or current evidence of acute ischemia.Mild  volume loss with mild biparietal periventricular white matter hyperintensity. Findings are stable from 2020. Findings may be related to prior infection and/or demyelination  < from: MR Lumbar Spine No Cont (05.10.24 @ 07:59) >Small left paracentral/foraminal disc protrusion which reaches and mildly displaces the left S1 nerve root.Correlate with symptoms.  as per neuro MRI findings not consistent with BLE weakness  - f/u thoracic spine MRI, r/o possible central demyelinating condition such as transverse myelitis or ADEM  - cleared by neuro for PT as tolerate    > HIV   CD4 , HIV VL CD4 ratio / %.  noted   Continue Biktarvy  Has been off Bactrim DS M/W/F since 2023. Suggestive of improvement in viremic control   states doesnot follow with id as op   id consulted     >DVT ppx : Heparin s/c

## 2024-05-10 NOTE — ED ADULT TRIAGE NOTE - ACCOMPANIED BY
General Sunscreen Counseling: I recommended a broad spectrum sunscreen with a SPF of 30 or higher.  I explained that SPF 30 sunscreens block approximately 97 percent of the sun's harmful rays.  Sunscreens should be applied at least 15 minutes prior to expected sun exposure and then every 2 hours after that as long as sun exposure continues. If swimming or exercising sunscreen should be reapplied every 45 minutes to an hour after getting wet or sweating.  One ounce, or the equivalent of a shot glass full of sunscreen, is adequate to protect the skin not covered by a bathing suit. I also recommended a lip balm with a sunscreen as well. Sun protective clothing can be used in lieu of sunscreen but must be worn the entire time you are exposed to the sun's rays. Detail Level: Generalized Products Recommended: Zinc sunscreen, Elta MD UV, ALISSA Ashton Zinc, Yumiko Ramos Posay, lip balm with SPF EMT/paramedic

## 2024-05-10 NOTE — PROGRESS NOTE ADULT - SUBJECTIVE AND OBJECTIVE BOX
[facility]                                     Neurology at [place]                                 Jolanta Marte, & Rene                                  [address]                                        [place], [place], [address]                                             [phone]    CC:   leg weakness  HPI:  The patient is a 34y Male who presented with bilateral leg weakness of acute onset for 1-2 days prior to admission.  He has baseline pain in legs and uses gabapentin.  He also has history of GBS in 2018 after flu vaccine.  This coincides with cough and URI symptoms.    He also carries diagnosis of HIV since birth and is on Biktarvy.  Neurology is asked to evaluate.    Interval history:  legs moving a bit better, had b/l leg spasm overnight    Review of systems (neurology): Denies headache or dizziness. (+) weakness in b/l LE. no numbness.  Denies speech/language deficits. Denies diplopia/blurred vision.  Denies confusion    MEDICATIONS  (STANDING):  bictegravir 50 mG/emtricitabine 200 mG/tenofovir alafenamide 25 mG (BIKTARVY) 1 Tablet(s) Oral daily  cyanocobalamin 1000 MICROGram(s) Oral daily  heparin   Injectable 5000 Unit(s) SubCutaneous every 12 hours  influenza   Vaccine 0.5 milliLiter(s) IntraMuscular once  magnesium oxide 400 milliGRAM(s) Oral daily  magnesium sulfate  IVPB 2 Gram(s) IV Intermittent once  magnesium sulfate  IVPB 2 Gram(s) IV Intermittent once  potassium chloride    Tablet ER 20 milliEquivalent(s) Oral every 2 hours  potassium chloride    Tablet ER 20 milliEquivalent(s) Oral two times a day  pregabalin 150 milliGRAM(s) Oral two times a day  QUEtiapine 50 milliGRAM(s) Oral at bedtime  QUEtiapine 300 milliGRAM(s) Oral at bedtime  trimethoprim  160 mG/sulfamethoxazole 800 mG 1 Tablet(s) Oral <User Schedule>    MEDICATIONS  (PRN):  acetaminophen     Tablet .. 650 milliGRAM(s) Oral every 6 hours PRN Temp greater or equal to 38C (100.4F), Mild Pain (1 - 3)  aluminum hydroxide/magnesium hydroxide/simethicone Suspension 30 milliLiter(s) Oral every 4 hours PRN Dyspepsia  LORazepam     Tablet 1 milliGRAM(s) Oral daily PRN Anxiety'  melatonin 3 milliGRAM(s) Oral at bedtime PRN Insomnia  ondansetron Injectable 4 milliGRAM(s) IV Push every 8 hours PRN Nausea and/or Vomiting      Vital Signs Last 24 Hrs  T(C): 36.3 (10 May 2024 11:16), Max: 37.5 (09 May 2024 15:35)  T(F): 97.4 (10 May 2024 11:16), Max: 99.5 (09 May 2024 15:35)  HR: 90 (10 May 2024 11:16) (66 - 92)  BP: 119/77 (10 May 2024 11:16) (111/70 - 126/79)  BP(mean): --  RR: 18 (10 May 2024 11:16) (18 - 18)  SpO2: 94% (10 May 2024 11:16) (94% - 100%)    Parameters below as of 10 May 2024 07:58  Patient On (Oxygen Delivery Method): room air    Detailed Neurologic Exam:    Mental status: The patient is awake and alert and has normal attention span.  The patient is fully oriented in 3 spheres. The patient is oriented to current events. The patient is able to name objects, follow commands, repeat sentences.    Cranial nerves: Pupils equal and react symmetrically to light. There is no visual field deficit to confrontation. Extraocular motion is full with no nystagmus. There is no ptosis. Facial sensation is intact. Facial musculature is symmetric. Palate elevates symmetrically. Tongue is midline.    Motor: There is normal bulk and tone.  There is no tremor.  Strength is 5/5 in the right arm and 3 to 4-/5 proximal, 2-3/5 distal leg.   Strength is 5/5 in the left arm and 3 to 4-/5 proximal, 2-3/5 distal leg.  there remains an element of pain limitation to leg movement    Sensation: Intact to light touch and pin in 4 extremities    Reflexes: 2+ biceps/BR/ 1+ patellar DTR and plantar responses are flexor.    Cerebellar: There is no dysmetria on finger to nose testing.    Gait : deferred    LABS:                         11.8   3.93  )-----------( 227      ( 10 May 2024 07:32 )             36.3     05-10    139  |  103  |  6.0<L>  ----------------------------<  99  3.4<L>   |  22.0  |  0.72    Ca    8.1<L>      10 May 2024 07:32  Mg     1.5     05-10    TPro  6.5<L>  /  Alb  3.5  /  TBili  0.3<L>  /  DBili  x   /  AST  16  /  ALT  11  /  AlkPhos  83  05-10    LIVER FUNCTIONS - ( 10 May 2024 07:32 )  Alb: 3.5 g/dL / Pro: 6.5 g/dL / ALK PHOS: 83 U/L / ALT: 11 U/L / AST: 16 U/L / GGT: x            Cerebrospinal Fluid Cell Count-1 (24 @ 04:46)   CSF Color: No Color  CSF Appearance: Clear  CSF Neutrophils: N/A: WBC count is <5 therefore no differential performed. %  Tube Type: Tube 4  RBC Count - Spinal Fluid: 29 /cmm  Total Nucleated Cell Count, CSF: 3 /uL  Protein, CSF (24 @ 04:46)   Protein, CSF: 32 mg/dL  Glucose, CSF (24 @ 04:46)   Glucose, CSF: 55 mg/dLG/24h  CSF IgG Index (24 @ 04:46)   Albumin: 3305 mg/dL  Quantitative Ig mg/dL  IgG/Albumin Ratio, Serum: 0.29 Ratio  IgG CSF: 5.4 mg/dL  CSF ALBU: 22.6 mg/dL  IgG/Albumin Ratio, CSF: 0.24 Ratio  IgG Index: 0.8  IgG Synthesis: 8.9 mg/dayCulture -   CSF with Gram Stain . (24 @ 04:46)   Gram Stain:   No polymorphonuclear leukocytes   No organisms seen   VDRL Titer, CSF (21 @ 15:52)   VDRL Titer, CSF: Nonreact:   Lyme Antibodies CSF [place] (21 @ 15:52)   Lyme Antibodies CSF [place]: Nonreact:  CSF PCR Panel (21 @ 15:52)   CSF PCR Result: NotDetec:       RADIOLOGY & ADDITIONAL STUDIES (independently reviewed unless otherwise noted):  MR Head No Cont (05.10.24 @ 07:59)   IMPRESSION:  No evidence of a mass or current evidence of acute ischemia.  Mild  volume loss with mild biparietal periventricular white matter   hyperintensity. Findings are stable from . Findings may be related to   prior infection and/or demyelination. Correlate with patient's clinical   history,    MR Lumbar Spine No Cont (05.10.24 @ 07:59)   IMPRESSION:  Small left paracentral/foraminal disc protrusion which reaches and mildly   displaces the left S1 nerve root.  Correlate with symptoms.

## 2024-05-10 NOTE — PROGRESS NOTE ADULT - SUBJECTIVE AND OBJECTIVE BOX
Patient is a 34y old  Male who presents with a chief complaint of     Patient seen and examined at bedside. No overnight events reported.     ALLERGIES:  Ceclor (Unknown)  Toradol (Swelling)  Toradol (Anaphylaxis; Swelling)    MEDICATIONS  (STANDING):  bictegravir 50 mG/emtricitabine 200 mG/tenofovir alafenamide 25 mG (BIKTARVY) 1 Tablet(s) Oral daily  cyanocobalamin 1000 MICROGram(s) Oral daily  heparin   Injectable 5000 Unit(s) SubCutaneous every 12 hours  influenza   Vaccine 0.5 milliLiter(s) IntraMuscular once  magnesium oxide 400 milliGRAM(s) Oral daily  potassium chloride    Tablet ER 20 milliEquivalent(s) Oral every 2 hours  potassium chloride    Tablet ER 20 milliEquivalent(s) Oral two times a day  pregabalin 150 milliGRAM(s) Oral two times a day  QUEtiapine 50 milliGRAM(s) Oral at bedtime  QUEtiapine 300 milliGRAM(s) Oral at bedtime  trimethoprim  160 mG/sulfamethoxazole 800 mG 1 Tablet(s) Oral <User Schedule>    MEDICATIONS  (PRN):  acetaminophen     Tablet .. 650 milliGRAM(s) Oral every 6 hours PRN Temp greater or equal to 38C (100.4F), Mild Pain (1 - 3)  aluminum hydroxide/magnesium hydroxide/simethicone Suspension 30 milliLiter(s) Oral every 4 hours PRN Dyspepsia  LORazepam     Tablet 1 milliGRAM(s) Oral daily PRN Anxiety'  melatonin 3 milliGRAM(s) Oral at bedtime PRN Insomnia  ondansetron Injectable 4 milliGRAM(s) IV Push every 8 hours PRN Nausea and/or Vomiting    Vital Signs Last 24 Hrs  T(F): 97.4 (10 May 2024 11:16), Max: 99.5 (09 May 2024 15:35)  HR: 90 (10 May 2024 11:16) (66 - 92)  BP: 119/77 (10 May 2024 11:16) (111/70 - 126/79)  RR: 18 (10 May 2024 11:16) (18 - 18)  SpO2: 94% (10 May 2024 11:16) (94% - 100%)  I&O's Summary    09 May 2024 07:01  -  10 May 2024 07:00  --------------------------------------------------------  IN: 500 mL / OUT: 900 mL / NET: -400 mL      PHYSICAL EXAM:  General: NAD, A/O x 3  ENT: MMM, no thrush  Neck: Supple, No JVD  Lungs: Clear to auscultation bilaterally, good air entry, non-labored breathing  Cardio: RRR, S1/S2, No murmur  Abdomen: Soft, Nontender, Nondistended; Bowel sounds present  Extremities: No calf tenderness, No pitting edema  neuro: Pupils equal and react symmetrically to light. no nystagmus. no facial asymmetry   Motor: Strength is 5/5 in the right arm ,3/5 leg.  5/5 in the left arm 3/5 leg        LABS:                        11.8   3.93  )-----------( 227      ( 10 May 2024 07:32 )             36.3     05-10    139  |  103  |  6.0  ----------------------------<  99  3.4   |  22.0  |  0.72    Ca    8.1      10 May 2024 07:32  Mg     1.5     05-10    TPro  6.5  /  Alb  3.5  /  TBili  0.3  /  DBili  x   /  AST  16  /  ALT  11  /  AlkPhos  83  05-10                CARDIAC MARKERS ( 08 May 2024 11:35 )  x     / x     / 174 U/L / x     / 1.4 ng/mL        TSH 0.54   TSH with FT4 reflex --  Total T3 122        Urinalysis Basic - ( 10 May 2024 07:32 )    Color: x / Appearance: x / SG: x / pH: x  Gluc: 99 mg/dL / Ketone: x  / Bili: x / Urobili: x   Blood: x / Protein: x / Nitrite: x   Leuk Esterase: x / RBC: x / WBC x   Sq Epi: x / Non Sq Epi: x / Bacteria: x        Culture - CSF with Gram Stain (collected 08 May 2024 04:46)  Source: .CSF CSF.lumbar  Gram Stain (08 May 2024 09:23):    No polymorphonuclear leukocytes    No organisms seen    by cytocentrifuge  Preliminary Report (09 May 2024 06:52):    No growth    Culture - Urine (collected 08 May 2024 03:52)  Source: Clean Catch Clean Catch (Midstream)  Final Report (09 May 2024 16:19):    <10,000 CFU/mL Normal Urogenital Jeimy        RADIOLOGY & ADDITIONAL TESTS:    Care Discussed with Consultants/Other Providers:

## 2024-05-10 NOTE — PROGRESS NOTE ADULT - ASSESSMENT
The patient is a 34y Male who is followed by neurology because of bilateral leg weakness    Leg weakness  His strength is a bit better.  Possible recurrence of GBS symptoms in setting of viral infection  CSF not consistent with GBS, may be inflammatory, but he had elevated CRP and may have systemic inflammatory process given infection  MRI brain and lumbar spine as above- does not explain BLE weakness  Await thoracic spine MRI  possible central demyelinating condition such as transverse myelitis or ADEM  PT as tolerated    will follow with you    Karri Stover MD PhD   [id number]

## 2024-05-11 LAB
ALBUMIN SERPL ELPH-MCNC: 3.7 G/DL — SIGNIFICANT CHANGE UP (ref 3.3–5.2)
ALP SERPL-CCNC: 81 U/L — SIGNIFICANT CHANGE UP (ref 40–120)
ALT FLD-CCNC: 11 U/L — SIGNIFICANT CHANGE UP
ANION GAP SERPL CALC-SCNC: 12 MMOL/L — SIGNIFICANT CHANGE UP (ref 5–17)
AST SERPL-CCNC: 14 U/L — SIGNIFICANT CHANGE UP
BILIRUB SERPL-MCNC: 0.3 MG/DL — LOW (ref 0.4–2)
BUN SERPL-MCNC: 9.7 MG/DL — SIGNIFICANT CHANGE UP (ref 8–20)
CALCIUM SERPL-MCNC: 8.6 MG/DL — SIGNIFICANT CHANGE UP (ref 8.4–10.5)
CHLORIDE SERPL-SCNC: 104 MMOL/L — SIGNIFICANT CHANGE UP (ref 96–108)
CO2 SERPL-SCNC: 23 MMOL/L — SIGNIFICANT CHANGE UP (ref 22–29)
CREAT SERPL-MCNC: 0.77 MG/DL — SIGNIFICANT CHANGE UP (ref 0.5–1.3)
EGFR: 120 ML/MIN/1.73M2 — SIGNIFICANT CHANGE UP
GLUCOSE SERPL-MCNC: 124 MG/DL — HIGH (ref 70–99)
HCT VFR BLD CALC: 38.3 % — LOW (ref 39–50)
HGB BLD-MCNC: 12.3 G/DL — LOW (ref 13–17)
MAGNESIUM SERPL-MCNC: 1.3 MG/DL — LOW (ref 1.6–2.6)
MCHC RBC-ENTMCNC: 29.5 PG — SIGNIFICANT CHANGE UP (ref 27–34)
MCHC RBC-ENTMCNC: 32.1 GM/DL — SIGNIFICANT CHANGE UP (ref 32–36)
MCV RBC AUTO: 91.8 FL — SIGNIFICANT CHANGE UP (ref 80–100)
PLATELET # BLD AUTO: 249 K/UL — SIGNIFICANT CHANGE UP (ref 150–400)
POTASSIUM SERPL-MCNC: 3.6 MMOL/L — SIGNIFICANT CHANGE UP (ref 3.5–5.3)
POTASSIUM SERPL-SCNC: 3.6 MMOL/L — SIGNIFICANT CHANGE UP (ref 3.5–5.3)
PROT SERPL-MCNC: 7.1 G/DL — SIGNIFICANT CHANGE UP (ref 6.6–8.7)
RBC # BLD: 4.17 M/UL — LOW (ref 4.2–5.8)
RBC # FLD: 13 % — SIGNIFICANT CHANGE UP (ref 10.3–14.5)
SODIUM SERPL-SCNC: 139 MMOL/L — SIGNIFICANT CHANGE UP (ref 135–145)
WBC # BLD: 3.61 K/UL — LOW (ref 3.8–10.5)
WBC # FLD AUTO: 3.61 K/UL — LOW (ref 3.8–10.5)

## 2024-05-11 PROCEDURE — 99232 SBSQ HOSP IP/OBS MODERATE 35: CPT

## 2024-05-11 RX ADMIN — Medication 150 MILLIGRAM(S): at 05:47

## 2024-05-11 RX ADMIN — PREGABALIN 1000 MICROGRAM(S): 225 CAPSULE ORAL at 12:01

## 2024-05-11 RX ADMIN — QUETIAPINE FUMARATE 300 MILLIGRAM(S): 200 TABLET, FILM COATED ORAL at 21:43

## 2024-05-11 RX ADMIN — Medication 150 MILLIGRAM(S): at 17:39

## 2024-05-11 RX ADMIN — Medication 20 MILLIEQUIVALENT(S): at 17:39

## 2024-05-11 RX ADMIN — MAGNESIUM OXIDE 400 MG ORAL TABLET 400 MILLIGRAM(S): 241.3 TABLET ORAL at 08:56

## 2024-05-11 RX ADMIN — MAGNESIUM OXIDE 400 MG ORAL TABLET 400 MILLIGRAM(S): 241.3 TABLET ORAL at 17:39

## 2024-05-11 RX ADMIN — BICTEGRAVIR SODIUM, EMTRICITABINE, AND TENOFOVIR ALAFENAMIDE FUMARATE 1 TABLET(S): 30; 120; 15 TABLET ORAL at 12:01

## 2024-05-11 RX ADMIN — HEPARIN SODIUM 5000 UNIT(S): 5000 INJECTION INTRAVENOUS; SUBCUTANEOUS at 17:39

## 2024-05-11 RX ADMIN — QUETIAPINE FUMARATE 50 MILLIGRAM(S): 200 TABLET, FILM COATED ORAL at 21:43

## 2024-05-11 RX ADMIN — Medication 20 MILLIEQUIVALENT(S): at 05:47

## 2024-05-11 RX ADMIN — MAGNESIUM OXIDE 400 MG ORAL TABLET 400 MILLIGRAM(S): 241.3 TABLET ORAL at 12:01

## 2024-05-11 NOTE — PROGRESS NOTE ADULT - SUBJECTIVE AND OBJECTIVE BOX
Yemi Askew MD  [facility]  Contact via Teams or text/call at [phone]    Patient is a 34y old  Male who presents with a chief complaint of     Patient seen and examined at bedside. No overnight events reported.     ALLERGIES:  Ceclor (Unknown)  Toradol (Swelling)  Toradol (Anaphylaxis; Swelling)    MEDICATIONS  (STANDING):  bictegravir 50 mG/emtricitabine 200 mG/tenofovir alafenamide 25 mG (BIKTARVY) 1 Tablet(s) Oral daily  cyanocobalamin 1000 MICROGram(s) Oral daily  heparin   Injectable 5000 Unit(s) SubCutaneous every 12 hours  influenza   Vaccine 0.5 milliLiter(s) IntraMuscular once  magnesium oxide 400 milliGRAM(s) Oral three times a day with meals  potassium chloride    Tablet ER 20 milliEquivalent(s) Oral two times a day  pregabalin 150 milliGRAM(s) Oral two times a day  QUEtiapine 50 milliGRAM(s) Oral at bedtime  QUEtiapine 300 milliGRAM(s) Oral at bedtime  trimethoprim  160 mG/sulfamethoxazole 800 mG 1 Tablet(s) Oral <User Schedule>    MEDICATIONS  (PRN):  acetaminophen     Tablet .. 650 milliGRAM(s) Oral every 6 hours PRN Temp greater or equal to 38C (100.4F), Mild Pain (1 - 3)  aluminum hydroxide/magnesium hydroxide/simethicone Suspension 30 milliLiter(s) Oral every 4 hours PRN Dyspepsia  LORazepam     Tablet 1 milliGRAM(s) Oral daily PRN Anxiety'  melatonin 3 milliGRAM(s) Oral at bedtime PRN Insomnia  ondansetron Injectable 4 milliGRAM(s) IV Push every 8 hours PRN Nausea and/or Vomiting    Vital Signs Last 24 Hrs  T(F): 98.3 (11 May 2024 05:36), Max: 98.7 (10 May 2024 19:48)  HR: 90 (11 May 2024 05:36) (84 - 90)  BP: 121/76 (11 May 2024 05:36) (119/77 - 137/79)  RR: 18 (11 May 2024 05:36) (18 - 18)  SpO2: 96% (11 May 2024 05:36) (94% - 97%)  I&O's Summary    PHYSICAL EXAM:  General: NAD, A/O x 3  ENT: No gross hearing impairment, Moist mucous membranes, no thrush  Neck: Supple, No JVD  Lungs: Clear to auscultation bilaterally, good air entry, non-labored breathing  Cardio: RRR, S1/S2, No murmur  Abdomen: Soft, Nontender, Nondistended; Bowel sounds present  Extremities: No calf tenderness, No cyanosis, No pitting edema  Psych: Appropriate mood and affect    LABS:                        12.3   3.61  )-----------( 249      ( 11 May 2024 04:50 )             38.3     05-11    139  |  104  |  9.7  ----------------------------<  124  3.6   |  23.0  |  0.77    Ca    8.6      11 May 2024 04:50  Mg     1.3     05-11    TPro  7.1  /  Alb  3.7  /  TBili  0.3  /  DBili  x   /  AST  14  /  ALT  11  /  AlkPhos  81  05-11                CARDIAC MARKERS ( 08 May 2024 11:35 )  x     / x     / 174 U/L / x     / 1.4 ng/mL        TSH 0.54   TSH with FT4 reflex --  Total T3 122                  Urinalysis Basic - ( 11 May 2024 04:50 )    Color: x / Appearance: x / SG: x / pH: x  Gluc: 124 mg/dL / Ketone: x  / Bili: x / Urobili: x   Blood: x / Protein: x / Nitrite: x   Leuk Esterase: x / RBC: x / WBC x   Sq Epi: x / Non Sq Epi: x / Bacteria: x        Culture - CSF with Gram Stain (collected 08 May 2024 04:46)  Source: .CSF CSF.lumbar  Gram Stain (08 May 2024 09:23):    No polymorphonuclear leukocytes    No organisms seen    by cytocentrifuge  Preliminary Report (09 May 2024 06:52):    No growth    Culture - Urine (collected 08 May 2024 03:52)  Source: Clean Catch Clean Catch (Midstream)  Final Report (09 May 2024 16:19):    <10,000 CFU/mL Normal Urogenital Jeimy        RADIOLOGY & ADDITIONAL TESTS:    Care Discussed with Consultants/Other Providers:    Yemi Askew MD  [facility]  Contact via Teams or text/call at [phone]    Patient is a 34y old  Male who presents with a chief complaint of weakness.    Patient seen and examined at bedside. No overnight events reported.     ALLERGIES:  Ceclor (Unknown)  Toradol (Swelling)  Toradol (Anaphylaxis; Swelling)    MEDICATIONS  (STANDING):  bictegravir 50 mG/emtricitabine 200 mG/tenofovir alafenamide 25 mG (BIKTARVY) 1 Tablet(s) Oral daily  cyanocobalamin 1000 MICROGram(s) Oral daily  heparin   Injectable 5000 Unit(s) SubCutaneous every 12 hours  influenza   Vaccine 0.5 milliLiter(s) IntraMuscular once  magnesium oxide 400 milliGRAM(s) Oral three times a day with meals  potassium chloride    Tablet ER 20 milliEquivalent(s) Oral two times a day  pregabalin 150 milliGRAM(s) Oral two times a day  QUEtiapine 50 milliGRAM(s) Oral at bedtime  QUEtiapine 300 milliGRAM(s) Oral at bedtime  trimethoprim  160 mG/sulfamethoxazole 800 mG 1 Tablet(s) Oral <User Schedule>    MEDICATIONS  (PRN):  acetaminophen     Tablet .. 650 milliGRAM(s) Oral every 6 hours PRN Temp greater or equal to 38C (100.4F), Mild Pain (1 - 3)  aluminum hydroxide/magnesium hydroxide/simethicone Suspension 30 milliLiter(s) Oral every 4 hours PRN Dyspepsia  LORazepam     Tablet 1 milliGRAM(s) Oral daily PRN Anxiety'  melatonin 3 milliGRAM(s) Oral at bedtime PRN Insomnia  ondansetron Injectable 4 milliGRAM(s) IV Push every 8 hours PRN Nausea and/or Vomiting    Vital Signs Last 24 Hrs  T(F): 98.3 (11 May 2024 05:36), Max: 98.7 (10 May 2024 19:48)  HR: 90 (11 May 2024 05:36) (84 - 90)  BP: 121/76 (11 May 2024 05:36) (119/77 - 137/79)  RR: 18 (11 May 2024 05:36) (18 - 18)  SpO2: 96% (11 May 2024 05:36) (94% - 97%)  I&O's Summary    PHYSICAL EXAM:  General: A/O x 3, appears tired and lethargic  ENT: No gross hearing impairment, Moist mucous membranes, no thrush  Neck: Supple, No JVD  Lungs: Clear to auscultation bilaterally, good air entry, non-labored breathing  Cardio: RRR, S1/S2, No murmur  Abdomen: Soft, Nontender, Nondistended; Bowel sounds present  Extremities: No calf tenderness, No cyanosis, No pitting edema  Psych: Appropriate mood and affect    LABS:                        12.3   3.61  )-----------( 249      ( 11 May 2024 04:50 )             38.3     05-11    139  |  104  |  9.7  ----------------------------<  124  3.6   |  23.0  |  0.77    Ca    8.6      11 May 2024 04:50  Mg     1.3     05-11    TPro  7.1  /  Alb  3.7  /  TBili  0.3  /  DBili  x   /  AST  14  /  ALT  11  /  AlkPhos  81  05-11                CARDIAC MARKERS ( 08 May 2024 11:35 )  x     / x     / 174 U/L / x     / 1.4 ng/mL        TSH 0.54   TSH with FT4 reflex --  Total T3 122                  Urinalysis Basic - ( 11 May 2024 04:50 )    Color: x / Appearance: x / SG: x / pH: x  Gluc: 124 mg/dL / Ketone: x  / Bili: x / Urobili: x   Blood: x / Protein: x / Nitrite: x   Leuk Esterase: x / RBC: x / WBC x   Sq Epi: x / Non Sq Epi: x / Bacteria: x        Culture - CSF with Gram Stain (collected 08 May 2024 04:46)  Source: .CSF CSF.lumbar  Gram Stain (08 May 2024 09:23):    No polymorphonuclear leukocytes    No organisms seen    by cytocentrifuge  Preliminary Report (09 May 2024 06:52):    No growth    Culture - Urine (collected 08 May 2024 03:52)  Source: Clean Catch Clean Catch (Midstream)  Final Report (09 May 2024 16:19):    <10,000 CFU/mL Normal Urogenital Jeimy        RADIOLOGY & ADDITIONAL TESTS:    Care Discussed with Consultants/Other Providers:

## 2024-05-11 NOTE — PROGRESS NOTE ADULT - ASSESSMENT
The patient is a 34y Male who is followed by neurology because of bilateral leg weakness    Leg weakness  His strength is a bit better.  Possible recurrence of GBS symptoms in setting of viral infection  CSF not consistent with GBS, may be inflammatory, but he had elevated CRP and may have systemic inflammatory process given infection  MRI brain and lumbar spine as above- does not explain BLE weakness  Await thoracic spine MRI- I reordered this today  possible central demyelinating condition such as transverse myelitis or ADEM- to be evaluated on T-spine MRI  PT as tolerated    will follow with you    Karri Stover MD PhD   [id number]

## 2024-05-11 NOTE — PROGRESS NOTE ADULT - ASSESSMENT
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 Despite pt reporting that his HIV is excellently controlled he has been prescribed Bactrim at [facility] ppx doses as well as Fluc suggestive of rect oesophageal thrush    Rule out Guillain [place] Syndrome secondary to Enteroviral infection   (Broad differential including HIV induced distal symmetric polyneuropathy / ? recrudescence in the setting of acute enteroviral infection)  h/o GBS post influenza vaccination   LP done, no Albuminocytologic dissociation , less consistent with GBS  Continue Lyrica 100 mg bid   neuro consulted   MRI brain and lumbar spine --  per neuro MRI findings not consistent with BLE weakness  - f/u thoracic spine MRI  - cleared by neuro for PT as tolerate    HIV  noted   Continue Biktarvy  Has been off Bactrim DS M/W/F since 2023. 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 Despite pt reporting that his HIV is excellently controlled he has been prescribed Bactrim at [facility] ppx doses as well as Fluc suggestive of rect oesophageal thrush    Rule out Guillain [place] Syndrome secondary to Enteroviral infection   (Broad differential including HIV induced distal symmetric polyneuropathy / ? recrudescence in the setting of acute enteroviral infection)  h/o GBS post influenza vaccination   LP done, no Albuminocytologic dissociation , less consistent with GBS  Continue Lyrica 100 mg bid   neuro consulted   MRI brain and lumbar spine --  per neuro MRI findings not consistent with BLE weakness  - f/u thoracic spine MRI  - cleared by neuro for PT as tolerate    HIV  noted   Continue Biktarvy  Has been off Bactrim DS M/W/F since 2023. Suggestive of improvement in viremic control   advised regular follow up with ID as outpatient     DVT ppx : Heparin s/c     Dispo: rehab

## 2024-05-11 NOTE — PROGRESS NOTE ADULT - SUBJECTIVE AND OBJECTIVE BOX
[facility]                                     Neurology at [place]                                 Jolanta Marte, & Rene                                  [address]                                        [place], [place], [address]                                             [phone]    CC:   leg weakness  HPI:  The patient is a 34y Male who presented with bilateral leg weakness of acute onset for 1-2 days prior to admission.  He has baseline pain in legs and uses gabapentin.  He also has history of GBS in 2018 after flu vaccine.  This coincides with cough and URI symptoms.    He also carries diagnosis of HIV since birth and is on Biktarvy.  Neurology is asked to evaluate.    Interval history:  legs moving better, able to lift higher off bed, less pain    Review of systems (neurology): Denies headache or dizziness. (+) weakness in b/l LE. no numbness.  Denies speech/language deficits. Denies diplopia/blurred vision.  Denies confusion    MEDICATIONS  (STANDING):  bictegravir 50 mG/emtricitabine 200 mG/tenofovir alafenamide 25 mG (BIKTARVY) 1 Tablet(s) Oral daily  cyanocobalamin 1000 MICROGram(s) Oral daily  heparin   Injectable 5000 Unit(s) SubCutaneous every 12 hours  influenza   Vaccine 0.5 milliLiter(s) IntraMuscular once  magnesium oxide 400 milliGRAM(s) Oral three times a day with meals  potassium chloride    Tablet ER 20 milliEquivalent(s) Oral two times a day  pregabalin 150 milliGRAM(s) Oral two times a day  QUEtiapine 50 milliGRAM(s) Oral at bedtime  QUEtiapine 300 milliGRAM(s) Oral at bedtime  trimethoprim  160 mG/sulfamethoxazole 800 mG 1 Tablet(s) Oral <User Schedule>    MEDICATIONS  (PRN):  acetaminophen     Tablet .. 650 milliGRAM(s) Oral every 6 hours PRN Temp greater or equal to 38C (100.4F), Mild Pain (1 - 3)  aluminum hydroxide/magnesium hydroxide/simethicone Suspension 30 milliLiter(s) Oral every 4 hours PRN Dyspepsia  LORazepam     Tablet 1 milliGRAM(s) Oral daily PRN Anxiety'  melatonin 3 milliGRAM(s) Oral at bedtime PRN Insomnia  ondansetron Injectable 4 milliGRAM(s) IV Push every 8 hours PRN Nausea and/or Vomiting      Vital Signs Last 24 Hrs  T(C): 36.7 (11 May 2024 09:10), Max: 37.1 (10 May 2024 19:48)  T(F): 98 (11 May 2024 09:10), Max: 98.7 (10 May 2024 19:48)  HR: 85 (11 May 2024 09:10) (84 - 90)  BP: 120/76 (11 May 2024 09:10) (120/76 - 137/79)  BP(mean): --  RR: 18 (11 May 2024 09:10) (18 - 18)  SpO2: 98% (11 May 2024 09:10) (95% - 98%)    Parameters below as of 11 May 2024 09:10  Patient On (Oxygen Delivery Method): room air        Detailed Neurologic Exam:    Mental status: The patient is awake and alert and has normal attention span.  The patient is fully oriented in 3 spheres. The patient is oriented to current events. The patient is able to name objects, follow commands, repeat sentences.    Cranial nerves: Pupils equal and react symmetrically to light. There is no visual field deficit to confrontation. Extraocular motion is full with no nystagmus. There is no ptosis. Facial sensation is intact. Facial musculature is symmetric. Palate elevates symmetrically. Tongue is midline.    Motor: There is normal bulk and tone.  There is no tremor.  Strength is 5/5 in the right arm and 4-/5 proximal, 3/5 distal leg.   Strength is 5/5 in the left arm and  4-/5 proximal, 3/5 distal leg.      Sensation: Intact to light touch and pin in 4 extremities    Reflexes: 2+ biceps/BR/ 1+ patellar DTR and plantar responses are flexor.    Cerebellar: There is no dysmetria on finger to nose testing.    Gait : deferred    LABS:                         12.3   3.61  )-----------( 249      ( 11 May 2024 04:50 )             38.3     05-11    139  |  104  |  9.7  ----------------------------<  124<H>  3.6   |  23.0  |  0.77    Ca    8.6      11 May 2024 04:50  Mg     1.3     05-    TPro  7.1  /  Alb  3.7  /  TBili  0.3<L>  /  DBili  x   /  AST  14  /  ALT  11  /  AlkPhos  81  05-11    LIVER FUNCTIONS - ( 11 May 2024 04:50 )  Alb: 3.7 g/dL / Pro: 7.1 g/dL / ALK PHOS: 81 U/L / ALT: 11 U/L / AST: 14 U/L / GGT: x           Cerebrospinal Fluid Cell Count-1 (24 @ 04:46)   CSF Color: No Color  CSF Appearance: Clear  CSF Neutrophils: N/A: WBC count is <5 therefore no differential performed. %  Tube Type: Tube 4  RBC Count - Spinal Fluid: 29 /cmm  Total Nucleated Cell Count, CSF: 3 /uL  Protein, CSF (24 @ 04:46)   Protein, CSF: 32 mg/dL  Glucose, CSF (24 @ 04:46)   Glucose, CSF: 55 mg/dLG/24h  CSF IgG Index (24 @ 04:46)   Albumin: 3305 mg/dL  Quantitative Ig mg/dL  IgG/Albumin Ratio, Serum: 0.29 Ratio  IgG CSF: 5.4 mg/dL  CSF ALBU: 22.6 mg/dL  IgG/Albumin Ratio, CSF: 0.24 Ratio  IgG Index: 0.8  IgG Synthesis: 8.9 mg/dayCulture -   CSF with Gram Stain . (24 @ 04:46)   Gram Stain:   No polymorphonuclear leukocytes   No organisms seen   VDRL Titer, CSF (21 @ 15:52)   VDRL Titer, CSF: Nonreact:   Lyme Antibodies CSF [place] (21 @ 15:52)   Lyme Antibodies CSF [place]: Nonreact:  CSF PCR Panel (21 @ 15:52)   CSF PCR Result: NotDetec:       RADIOLOGY & ADDITIONAL STUDIES (independently reviewed unless otherwise noted):  MR Head No Cont (05.10.24 @ 07:59)   IMPRESSION:  No evidence of a mass or current evidence of acute ischemia.  Mild  volume loss with mild biparietal periventricular white matter   hyperintensity. Findings are stable from . Findings may be related to   prior infection and/or demyelination. Correlate with patient's clinical   history,    MR Lumbar Spine No Cont (05.10.24 @ 07:59)   IMPRESSION:  Small left paracentral/foraminal disc protrusion which reaches and mildly   displaces the left S1 nerve root.  Correlate with symptoms.

## 2024-05-12 LAB
ANION GAP SERPL CALC-SCNC: 14 MMOL/L — SIGNIFICANT CHANGE UP (ref 5–17)
BUN SERPL-MCNC: 13.7 MG/DL — SIGNIFICANT CHANGE UP (ref 8–20)
CALCIUM SERPL-MCNC: 8.9 MG/DL — SIGNIFICANT CHANGE UP (ref 8.4–10.5)
CHLORIDE SERPL-SCNC: 101 MMOL/L — SIGNIFICANT CHANGE UP (ref 96–108)
CO2 SERPL-SCNC: 23 MMOL/L — SIGNIFICANT CHANGE UP (ref 22–29)
CREAT SERPL-MCNC: 0.87 MG/DL — SIGNIFICANT CHANGE UP (ref 0.5–1.3)
EGFR: 115 ML/MIN/1.73M2 — SIGNIFICANT CHANGE UP
GLUCOSE SERPL-MCNC: 98 MG/DL — SIGNIFICANT CHANGE UP (ref 70–99)
HCT VFR BLD CALC: 41 % — SIGNIFICANT CHANGE UP (ref 39–50)
HGB BLD-MCNC: 13.1 G/DL — SIGNIFICANT CHANGE UP (ref 13–17)
MCHC RBC-ENTMCNC: 29.4 PG — SIGNIFICANT CHANGE UP (ref 27–34)
MCHC RBC-ENTMCNC: 32 GM/DL — SIGNIFICANT CHANGE UP (ref 32–36)
MCV RBC AUTO: 92.1 FL — SIGNIFICANT CHANGE UP (ref 80–100)
PLATELET # BLD AUTO: 264 K/UL — SIGNIFICANT CHANGE UP (ref 150–400)
POTASSIUM SERPL-MCNC: 3.7 MMOL/L — SIGNIFICANT CHANGE UP (ref 3.5–5.3)
POTASSIUM SERPL-SCNC: 3.7 MMOL/L — SIGNIFICANT CHANGE UP (ref 3.5–5.3)
RBC # BLD: 4.45 M/UL — SIGNIFICANT CHANGE UP (ref 4.2–5.8)
RBC # FLD: 13 % — SIGNIFICANT CHANGE UP (ref 10.3–14.5)
SODIUM SERPL-SCNC: 138 MMOL/L — SIGNIFICANT CHANGE UP (ref 135–145)
WBC # BLD: 3.76 K/UL — LOW (ref 3.8–10.5)
WBC # FLD AUTO: 3.76 K/UL — LOW (ref 3.8–10.5)

## 2024-05-12 PROCEDURE — 99232 SBSQ HOSP IP/OBS MODERATE 35: CPT

## 2024-05-12 RX ORDER — DIAZEPAM 5 MG
10 TABLET ORAL ONCE
Refills: 0 | Status: DISCONTINUED | OUTPATIENT
Start: 2024-05-12 | End: 2024-05-13

## 2024-05-12 RX ADMIN — Medication 30 MILLILITER(S): at 06:18

## 2024-05-12 RX ADMIN — Medication 20 MILLIEQUIVALENT(S): at 05:40

## 2024-05-12 RX ADMIN — Medication 150 MILLIGRAM(S): at 17:42

## 2024-05-12 RX ADMIN — Medication 150 MILLIGRAM(S): at 05:40

## 2024-05-12 RX ADMIN — BICTEGRAVIR SODIUM, EMTRICITABINE, AND TENOFOVIR ALAFENAMIDE FUMARATE 1 TABLET(S): 30; 120; 15 TABLET ORAL at 12:20

## 2024-05-12 RX ADMIN — QUETIAPINE FUMARATE 300 MILLIGRAM(S): 200 TABLET, FILM COATED ORAL at 21:36

## 2024-05-12 RX ADMIN — MAGNESIUM OXIDE 400 MG ORAL TABLET 400 MILLIGRAM(S): 241.3 TABLET ORAL at 12:20

## 2024-05-12 RX ADMIN — PREGABALIN 1000 MICROGRAM(S): 225 CAPSULE ORAL at 12:20

## 2024-05-12 RX ADMIN — QUETIAPINE FUMARATE 50 MILLIGRAM(S): 200 TABLET, FILM COATED ORAL at 21:36

## 2024-05-12 NOTE — PHYSICAL THERAPY INITIAL EVALUATION ADULT - LEVEL OF INDEPENDENCE: GAIT, REHAB EVAL
pt amb 30' with RW and supervision and 20' without device contact guard. gait with bilateral knee flexion and bilateral foot drag(left >right). gait similar with and without RW.

## 2024-05-12 NOTE — PHYSICAL THERAPY INITIAL EVALUATION ADULT - ADDITIONAL COMMENTS
pt states he is currently homeless. was staying at a friend's apartment that had stairs, but that he was not going back there. does not use cane or RW.

## 2024-05-12 NOTE — PROGRESS NOTE ADULT - SUBJECTIVE AND OBJECTIVE BOX
Yemi Askew MD  [facility]  Contact via Teams or text/call at [phone]    Patient is a 34y old  Male who presents with a chief complaint of weakness.    patient declining MRI this AM.  Declining IV placement.      Patient seen and examined at bedside. No overnight events reported.     ALLERGIES:  Ceclor (Unknown)  Toradol (Swelling)  Toradol (Anaphylaxis; Swelling)    MEDICATIONS  (STANDING):  bictegravir 50 mG/emtricitabine 200 mG/tenofovir alafenamide 25 mG (BIKTARVY) 1 Tablet(s) Oral daily  cyanocobalamin 1000 MICROGram(s) Oral daily  heparin   Injectable 5000 Unit(s) SubCutaneous every 12 hours  influenza   Vaccine 0.5 milliLiter(s) IntraMuscular once  magnesium oxide 400 milliGRAM(s) Oral three times a day with meals  potassium chloride    Tablet ER 20 milliEquivalent(s) Oral two times a day  pregabalin 150 milliGRAM(s) Oral two times a day  QUEtiapine 50 milliGRAM(s) Oral at bedtime  QUEtiapine 300 milliGRAM(s) Oral at bedtime  trimethoprim  160 mG/sulfamethoxazole 800 mG 1 Tablet(s) Oral <User Schedule>    MEDICATIONS  (PRN):  acetaminophen     Tablet .. 650 milliGRAM(s) Oral every 6 hours PRN Temp greater or equal to 38C (100.4F), Mild Pain (1 - 3)  aluminum hydroxide/magnesium hydroxide/simethicone Suspension 30 milliLiter(s) Oral every 4 hours PRN Dyspepsia  LORazepam     Tablet 1 milliGRAM(s) Oral daily PRN Anxiety'  melatonin 3 milliGRAM(s) Oral at bedtime PRN Insomnia  ondansetron Injectable 4 milliGRAM(s) IV Push every 8 hours PRN Nausea and/or Vomiting    Vital Signs Last 24 Hrs  T(F): 98.1 (12 May 2024 04:42), Max: 98.1 (12 May 2024 04:42)  HR: 84 (12 May 2024 04:42) (80 - 89)  BP: 119/71 (12 May 2024 04:42) (117/74 - 135/78)  RR: 18 (12 May 2024 04:42) (17 - 18)  SpO2: 98% (12 May 2024 04:42) (95% - 98%)  I&O's Summary    11 May 2024 07:01  -  12 May 2024 07:00  --------------------------------------------------------  IN: 0 mL / OUT: 500 mL / NET: -500 mL      PHYSICAL EXAM:  General: NAD, tired and limited conversation  ENT: No gross hearing impairment, Moist mucous membranes, no thrush  Neck: Supple, No JVD  Lungs: Clear to auscultation bilaterally, good air entry, non-labored breathing  Cardio: RRR, S1/S2, No murmur  Abdomen: Soft, Nontender, Nondistended; Bowel sounds present  Extremities: No calf tenderness, No cyanosis, No pitting edema  Psych: appears tired and does not want to be bothered    LABS:                        13.1   3.76  )-----------( 264      ( 12 May 2024 05:37 )             41.0     05-12    138  |  101  |  13.7  ----------------------------<  98  3.7   |  23.0  |  0.87    Ca    8.9      12 May 2024 05:37  Mg     1.3     05-11    TPro  7.1  /  Alb  3.7  /  TBili  0.3  /  DBili  x   /  AST  14  /  ALT  11  /  AlkPhos  81  05-11    Urinalysis Basic - ( 12 May 2024 05:37 )    Color: x / Appearance: x / SG: x / pH: x  Gluc: 98 mg/dL / Ketone: x  / Bili: x / Urobili: x   Blood: x / Protein: x / Nitrite: x   Leuk Esterase: x / RBC: x / WBC x   Sq Epi: x / Non Sq Epi: x / Bacteria: x    Culture - CSF with Gram Stain (collected 08 May 2024 04:46)  Source: .CSF CSF.lumbar  Gram Stain (08 May 2024 09:23):    No polymorphonuclear leukocytes    No organisms seen    by cytocentrifuge  Preliminary Report (09 May 2024 06:52):    No growth    Culture - Urine (collected 08 May 2024 03:52)  Source: Clean Catch Clean Catch (Midstream)  Final Report (09 May 2024 16:19):    <10,000 CFU/mL Normal Urogenital Jeimy        RADIOLOGY & ADDITIONAL TESTS:    Care Discussed with Consultants/Other Providers:

## 2024-05-12 NOTE — PROGRESS NOTE ADULT - ASSESSMENT
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 Despite pt reporting that his HIV is excellently controlled he has been prescribed Bactrim at [facility] ppx doses as well as Fluc suggestive of rect oesophageal thrush    Rule out Guillain [place] Syndrome secondary to Enteroviral infection   (Broad differential including HIV induced distal symmetric polyneuropathy / ? recrudescence in the setting of acute enteroviral infection)  h/o GBS post influenza vaccination   LP done, no Albuminocytologic dissociation , less consistent with GBS  Continue Lyrica 100 mg bid   neuro consulted   MRI brain and lumbar spine --  per neuro MRI findings not consistent with BLE weakness  - f/u thoracic spine MRI - PATIENT declining on 5/12 stating he is claustrophobic, offered IV ativan before MRi but patient declined  - cleared by neuro for PT as tolerate    HIV  noted   Continue Biktarvy  Has been off Bactrim DS M/W/F since 2023. Suggestive of improvement in viremic control   advised regular follow up with ID as outpatient     DVT ppx : Heparin s/c     Dispo: probable rehab

## 2024-05-12 NOTE — PHYSICAL THERAPY INITIAL EVALUATION ADULT - PERTINENT HX OF CURRENT PROBLEM, REHAB EVAL
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 Despite pt reporting that his HIV is excellently controlled he has been prescribed Bactrim at [facility] ppx doses as well as Fluc suggestive of rect oesophageal thrush.

## 2024-05-13 VITALS
DIASTOLIC BLOOD PRESSURE: 66 MMHG | OXYGEN SATURATION: 96 % | TEMPERATURE: 98 F | SYSTOLIC BLOOD PRESSURE: 103 MMHG | HEART RATE: 95 BPM | RESPIRATION RATE: 20 BRPM

## 2024-05-13 LAB
CULTURE RESULTS: SIGNIFICANT CHANGE UP
SPECIMEN SOURCE: SIGNIFICANT CHANGE UP

## 2024-05-13 PROCEDURE — 85025 COMPLETE CBC W/AUTO DIFF WBC: CPT

## 2024-05-13 PROCEDURE — 82553 CREATINE MB FRACTION: CPT

## 2024-05-13 PROCEDURE — 80053 COMPREHEN METABOLIC PANEL: CPT

## 2024-05-13 PROCEDURE — 87070 CULTURE OTHR SPECIMN AEROBIC: CPT

## 2024-05-13 PROCEDURE — 36415 COLL VENOUS BLD VENIPUNCTURE: CPT

## 2024-05-13 PROCEDURE — 86703 HIV-1/HIV-2 1 RESULT ANTBDY: CPT

## 2024-05-13 PROCEDURE — 94150 VITAL CAPACITY TEST: CPT

## 2024-05-13 PROCEDURE — 85652 RBC SED RATE AUTOMATED: CPT

## 2024-05-13 PROCEDURE — 87205 SMEAR GRAM STAIN: CPT

## 2024-05-13 PROCEDURE — 86702 HIV-2 ANTIBODY: CPT

## 2024-05-13 PROCEDURE — 84443 ASSAY THYROID STIM HORMONE: CPT

## 2024-05-13 PROCEDURE — 84436 ASSAY OF TOTAL THYROXINE: CPT

## 2024-05-13 PROCEDURE — 71045 X-RAY EXAM CHEST 1 VIEW: CPT

## 2024-05-13 PROCEDURE — 82945 GLUCOSE OTHER FLUID: CPT

## 2024-05-13 PROCEDURE — 83735 ASSAY OF MAGNESIUM: CPT

## 2024-05-13 PROCEDURE — 84480 ASSAY TRIIODOTHYRONINE (T3): CPT

## 2024-05-13 PROCEDURE — 0225U NFCT DS DNA&RNA 21 SARSCOV2: CPT

## 2024-05-13 PROCEDURE — 99233 SBSQ HOSP IP/OBS HIGH 50: CPT

## 2024-05-13 PROCEDURE — 87536 HIV-1 QUANT&REVRSE TRNSCRPJ: CPT

## 2024-05-13 PROCEDURE — 80048 BASIC METABOLIC PNL TOTAL CA: CPT

## 2024-05-13 PROCEDURE — 96374 THER/PROPH/DIAG INJ IV PUSH: CPT

## 2024-05-13 PROCEDURE — 82784 ASSAY IGA/IGD/IGG/IGM EACH: CPT

## 2024-05-13 PROCEDURE — 87389 HIV-1 AG W/HIV-1&-2 AB AG IA: CPT

## 2024-05-13 PROCEDURE — 82962 GLUCOSE BLOOD TEST: CPT

## 2024-05-13 PROCEDURE — 83036 HEMOGLOBIN GLYCOSYLATED A1C: CPT

## 2024-05-13 PROCEDURE — 82607 VITAMIN B-12: CPT

## 2024-05-13 PROCEDURE — 86431 RHEUMATOID FACTOR QUANT: CPT

## 2024-05-13 PROCEDURE — 86360 T CELL ABSOLUTE COUNT/RATIO: CPT

## 2024-05-13 PROCEDURE — 83916 OLIGOCLONAL BANDS: CPT

## 2024-05-13 PROCEDURE — 81001 URINALYSIS AUTO W/SCOPE: CPT

## 2024-05-13 PROCEDURE — 99285 EMERGENCY DEPT VISIT HI MDM: CPT

## 2024-05-13 PROCEDURE — 87086 URINE CULTURE/COLONY COUNT: CPT

## 2024-05-13 PROCEDURE — 85027 COMPLETE CBC AUTOMATED: CPT

## 2024-05-13 PROCEDURE — 72148 MRI LUMBAR SPINE W/O DYE: CPT | Mod: MC

## 2024-05-13 PROCEDURE — 62270 DX LMBR SPI PNXR: CPT

## 2024-05-13 PROCEDURE — 82550 ASSAY OF CK (CPK): CPT

## 2024-05-13 PROCEDURE — 86780 TREPONEMA PALLIDUM: CPT

## 2024-05-13 PROCEDURE — 84439 ASSAY OF FREE THYROXINE: CPT

## 2024-05-13 PROCEDURE — 97163 PT EVAL HIGH COMPLEX 45 MIN: CPT

## 2024-05-13 PROCEDURE — 82040 ASSAY OF SERUM ALBUMIN: CPT

## 2024-05-13 PROCEDURE — 82747 ASSAY OF FOLIC ACID RBC: CPT

## 2024-05-13 PROCEDURE — 86140 C-REACTIVE PROTEIN: CPT

## 2024-05-13 PROCEDURE — 70551 MRI BRAIN STEM W/O DYE: CPT | Mod: MC

## 2024-05-13 PROCEDURE — 86359 T CELLS TOTAL COUNT: CPT

## 2024-05-13 PROCEDURE — 84157 ASSAY OF PROTEIN OTHER: CPT

## 2024-05-13 PROCEDURE — 89051 BODY FLUID CELL COUNT: CPT

## 2024-05-13 PROCEDURE — 82042 OTHER SOURCE ALBUMIN QUAN EA: CPT

## 2024-05-13 NOTE — PROGRESS NOTE ADULT - SUBJECTIVE AND OBJECTIVE BOX
Patient is a 34y old  Male who presents with a chief complaint of weakness (12 May 2024 08:38)    INTERVAL HPI/OVERNIGHT EVENTS: Patient eloped prior to being seen. Unable to discuss risks and benefits of signing out AMA as patient left the building.     MEDICATIONS  (STANDING):  bictegravir 50 mG/emtricitabine 200 mG/tenofovir alafenamide 25 mG (BIKTARVY) 1 Tablet(s) Oral daily  cyanocobalamin 1000 MICROGram(s) Oral daily  diazepam    Tablet 10 milliGRAM(s) Oral once  heparin   Injectable 5000 Unit(s) SubCutaneous every 12 hours  influenza   Vaccine 0.5 milliLiter(s) IntraMuscular once  potassium chloride    Tablet ER 20 milliEquivalent(s) Oral two times a day  pregabalin 150 milliGRAM(s) Oral two times a day  QUEtiapine 50 milliGRAM(s) Oral at bedtime  QUEtiapine 300 milliGRAM(s) Oral at bedtime  trimethoprim  160 mG/sulfamethoxazole 800 mG 1 Tablet(s) Oral <User Schedule>    MEDICATIONS  (PRN):  acetaminophen     Tablet .. 650 milliGRAM(s) Oral every 6 hours PRN Temp greater or equal to 38C (100.4F), Mild Pain (1 - 3)  aluminum hydroxide/magnesium hydroxide/simethicone Suspension 30 milliLiter(s) Oral every 4 hours PRN Dyspepsia  LORazepam     Tablet 1 milliGRAM(s) Oral daily PRN Anxiety'  melatonin 3 milliGRAM(s) Oral at bedtime PRN Insomnia  ondansetron Injectable 4 milliGRAM(s) IV Push every 8 hours PRN Nausea and/or Vomiting      Allergies    Ceclor (Unknown)  Toradol (Swelling)  Toradol (Anaphylaxis; Swelling)    Intolerances        REVIEW OF SYSTEMS: all negative with exception of above    Vital Signs Last 24 Hrs  T(C): 36.6 (13 May 2024 09:12), Max: 36.9 (12 May 2024 16:42)  T(F): 97.9 (13 May 2024 09:12), Max: 98.4 (12 May 2024 16:42)  HR: 95 (13 May 2024 09:12) (77 - 96)  BP: 103/66 (13 May 2024 09:12) (103/66 - 126/74)  BP(mean): --  RR: 20 (13 May 2024 09:12) (18 - 20)  SpO2: 96% (13 May 2024 09:12) (95% - 97%)    Parameters below as of 13 May 2024 09:12  Patient On (Oxygen Delivery Method): room air        PHYSICAL EXAM:  Patient eloped    LABS:                        13.1   3.76  )-----------( 264      ( 12 May 2024 05:37 )             41.0     05-12    138  |  101  |  13.7  ----------------------------<  98  3.7   |  23.0  |  0.87    Ca    8.9      12 May 2024 05:37        Urinalysis Basic - ( 12 May 2024 05:37 )    Color: x / Appearance: x / SG: x / pH: x  Gluc: 98 mg/dL / Ketone: x  / Bili: x / Urobili: x   Blood: x / Protein: x / Nitrite: x   Leuk Esterase: x / RBC: x / WBC x   Sq Epi: x / Non Sq Epi: x / Bacteria: x      CAPILLARY BLOOD GLUCOSE          RADIOLOGY & ADDITIONAL TESTS:    Imaging Personally Reviewed:  [ ] YES  [ ] NO    Consultant(s) Notes Reviewed:  [ ] YES  [ ] NO    Care Discussed with Consultants/Other Providers [ ] YES  [ ] NO

## 2024-05-13 NOTE — PROGRESS NOTE ADULT - ASSESSMENT
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 Despite pt reporting that his HIV is excellently controlled he has been prescribed Bactrim at [facility] ppx doses as well as Fluc suggestive of rect oesophageal thrush    Rule out Guillain [place] Syndrome secondary to Enteroviral infection   (Broad differential including HIV induced distal symmetric polyneuropathy / ? recrudescence in the setting of acute enteroviral infection)  h/o GBS post influenza vaccination   LP done, no Albuminocytologic dissociation , less consistent with GBS  Continue Lyrica 100 mg bid   neuro consulted   MRI brain and lumbar spine --  per neuro MRI findings not consistent with BLE weakness  - f/u thoracic spine MRI - PATIENT declining on 5/12 stating he is claustrophobic, offered IV ativan before MRi but patient declined  - cleared by neuro for PT as tolerate    HIV  noted   Continue Biktarvy  Has been off Bactrim DS M/W/F since 2023. Suggestive of improvement in viremic control   advised regular follow up with ID as outpatient     DVT ppx : Heparin s/c     Dispo: Patient eloped prior to being seen. Unable to discuss risks and benefits of signing out AMA as patient left the building.

## 2024-05-14 ENCOUNTER — EMERGENCY (EMERGENCY)
Facility: HOSPITAL | Age: 34
LOS: 0 days | Discharge: HOME | End: 2024-05-14
Attending: EMERGENCY MEDICINE
Payer: MEDICAID

## 2024-05-14 VITALS
WEIGHT: 210.1 LBS | HEART RATE: 103 BPM | RESPIRATION RATE: 16 BRPM | SYSTOLIC BLOOD PRESSURE: 129 MMHG | HEIGHT: 73 IN | OXYGEN SATURATION: 100 % | TEMPERATURE: 98 F | DIASTOLIC BLOOD PRESSURE: 83 MMHG

## 2024-05-14 VITALS
HEART RATE: 98 BPM | SYSTOLIC BLOOD PRESSURE: 116 MMHG | OXYGEN SATURATION: 100 % | RESPIRATION RATE: 16 BRPM | DIASTOLIC BLOOD PRESSURE: 66 MMHG | TEMPERATURE: 98 F

## 2024-05-14 DIAGNOSIS — K92.1 MELENA: ICD-10-CM

## 2024-05-14 DIAGNOSIS — G89.29 OTHER CHRONIC PAIN: ICD-10-CM

## 2024-05-14 DIAGNOSIS — R10.9 UNSPECIFIED ABDOMINAL PAIN: ICD-10-CM

## 2024-05-14 DIAGNOSIS — B20 HUMAN IMMUNODEFICIENCY VIRUS [HIV] DISEASE: ICD-10-CM

## 2024-05-14 DIAGNOSIS — Z98.890 OTHER SPECIFIED POSTPROCEDURAL STATES: Chronic | ICD-10-CM

## 2024-05-14 DIAGNOSIS — G61.0 GUILLAIN-BARRE SYNDROME: ICD-10-CM

## 2024-05-14 DIAGNOSIS — Z88.6 ALLERGY STATUS TO ANALGESIC AGENT: ICD-10-CM

## 2024-05-14 DIAGNOSIS — Z88.1 ALLERGY STATUS TO OTHER ANTIBIOTIC AGENTS STATUS: ICD-10-CM

## 2024-05-14 DIAGNOSIS — K62.5 HEMORRHAGE OF ANUS AND RECTUM: ICD-10-CM

## 2024-05-14 DIAGNOSIS — M54.50 LOW BACK PAIN, UNSPECIFIED: ICD-10-CM

## 2024-05-14 LAB
ALBUMIN SERPL ELPH-MCNC: 4.1 G/DL — SIGNIFICANT CHANGE UP (ref 3.3–5)
ALP SERPL-CCNC: 85 U/L — SIGNIFICANT CHANGE UP (ref 40–120)
ALT FLD-CCNC: 32 U/L — SIGNIFICANT CHANGE UP (ref 12–78)
ANION GAP SERPL CALC-SCNC: 5 MMOL/L — SIGNIFICANT CHANGE UP (ref 5–17)
APTT BLD: 30.1 SEC — SIGNIFICANT CHANGE UP (ref 24.5–35.6)
AST SERPL-CCNC: 33 U/L — SIGNIFICANT CHANGE UP (ref 15–37)
BASOPHILS # BLD AUTO: 0.03 K/UL — SIGNIFICANT CHANGE UP (ref 0–0.2)
BASOPHILS NFR BLD AUTO: 0.4 % — SIGNIFICANT CHANGE UP (ref 0–2)
BILIRUB SERPL-MCNC: 0.4 MG/DL — SIGNIFICANT CHANGE UP (ref 0.2–1.2)
BLD GP AB SCN SERPL QL: SIGNIFICANT CHANGE UP
BUN SERPL-MCNC: 16 MG/DL — SIGNIFICANT CHANGE UP (ref 7–23)
CALCIUM SERPL-MCNC: 9.4 MG/DL — SIGNIFICANT CHANGE UP (ref 8.5–10.1)
CHLORIDE SERPL-SCNC: 105 MMOL/L — SIGNIFICANT CHANGE UP (ref 96–108)
CO2 SERPL-SCNC: 28 MMOL/L — SIGNIFICANT CHANGE UP (ref 22–31)
CREAT SERPL-MCNC: 0.94 MG/DL — SIGNIFICANT CHANGE UP (ref 0.5–1.3)
EGFR: 108 ML/MIN/1.73M2 — SIGNIFICANT CHANGE UP
EOSINOPHIL # BLD AUTO: 0.09 K/UL — SIGNIFICANT CHANGE UP (ref 0–0.5)
EOSINOPHIL NFR BLD AUTO: 1.3 % — SIGNIFICANT CHANGE UP (ref 0–6)
GLUCOSE SERPL-MCNC: 85 MG/DL — SIGNIFICANT CHANGE UP (ref 70–99)
HCT VFR BLD CALC: 43.4 % — SIGNIFICANT CHANGE UP (ref 39–50)
HGB BLD-MCNC: 14.2 G/DL — SIGNIFICANT CHANGE UP (ref 13–17)
IMM GRANULOCYTES NFR BLD AUTO: 0.4 % — SIGNIFICANT CHANGE UP (ref 0–0.9)
INR BLD: 1.02 RATIO — SIGNIFICANT CHANGE UP (ref 0.85–1.18)
LYMPHOCYTES # BLD AUTO: 2.7 K/UL — SIGNIFICANT CHANGE UP (ref 1–3.3)
LYMPHOCYTES # BLD AUTO: 37.7 % — SIGNIFICANT CHANGE UP (ref 13–44)
MCHC RBC-ENTMCNC: 30.3 PG — SIGNIFICANT CHANGE UP (ref 27–34)
MCHC RBC-ENTMCNC: 32.7 GM/DL — SIGNIFICANT CHANGE UP (ref 32–36)
MCV RBC AUTO: 92.5 FL — SIGNIFICANT CHANGE UP (ref 80–100)
MONOCYTES # BLD AUTO: 0.6 K/UL — SIGNIFICANT CHANGE UP (ref 0–0.9)
MONOCYTES NFR BLD AUTO: 8.4 % — SIGNIFICANT CHANGE UP (ref 2–14)
NEUTROPHILS # BLD AUTO: 3.72 K/UL — SIGNIFICANT CHANGE UP (ref 1.8–7.4)
NEUTROPHILS NFR BLD AUTO: 51.8 % — SIGNIFICANT CHANGE UP (ref 43–77)
OLIGOCLONAL BANDS CSF ELPH-IMP: PRESENT
PLATELET # BLD AUTO: 301 K/UL — SIGNIFICANT CHANGE UP (ref 150–400)
POTASSIUM SERPL-MCNC: 4 MMOL/L — SIGNIFICANT CHANGE UP (ref 3.5–5.3)
POTASSIUM SERPL-SCNC: 4 MMOL/L — SIGNIFICANT CHANGE UP (ref 3.5–5.3)
PROT SERPL-MCNC: 9.1 GM/DL — HIGH (ref 6–8.3)
PROTHROM AB SERPL-ACNC: 11.5 SEC — SIGNIFICANT CHANGE UP (ref 9.5–13)
RBC # BLD: 4.69 M/UL — SIGNIFICANT CHANGE UP (ref 4.2–5.8)
RBC # FLD: 13.1 % — SIGNIFICANT CHANGE UP (ref 10.3–14.5)
SODIUM SERPL-SCNC: 138 MMOL/L — SIGNIFICANT CHANGE UP (ref 135–145)
WBC # BLD: 7.17 K/UL — SIGNIFICANT CHANGE UP (ref 3.8–10.5)
WBC # FLD AUTO: 7.17 K/UL — SIGNIFICANT CHANGE UP (ref 3.8–10.5)

## 2024-05-14 PROCEDURE — 86850 RBC ANTIBODY SCREEN: CPT

## 2024-05-14 PROCEDURE — 74178 CT ABD&PLV WO CNTR FLWD CNTR: CPT | Mod: MC

## 2024-05-14 PROCEDURE — 99284 EMERGENCY DEPT VISIT MOD MDM: CPT | Mod: 25

## 2024-05-14 PROCEDURE — 80053 COMPREHEN METABOLIC PANEL: CPT

## 2024-05-14 PROCEDURE — 96375 TX/PRO/DX INJ NEW DRUG ADDON: CPT

## 2024-05-14 PROCEDURE — 86900 BLOOD TYPING SEROLOGIC ABO: CPT

## 2024-05-14 PROCEDURE — 85025 COMPLETE CBC W/AUTO DIFF WBC: CPT

## 2024-05-14 PROCEDURE — 86901 BLOOD TYPING SEROLOGIC RH(D): CPT

## 2024-05-14 PROCEDURE — 36415 COLL VENOUS BLD VENIPUNCTURE: CPT

## 2024-05-14 PROCEDURE — 96374 THER/PROPH/DIAG INJ IV PUSH: CPT | Mod: XU

## 2024-05-14 PROCEDURE — 85610 PROTHROMBIN TIME: CPT

## 2024-05-14 PROCEDURE — 85730 THROMBOPLASTIN TIME PARTIAL: CPT

## 2024-05-14 PROCEDURE — 74178 CT ABD&PLV WO CNTR FLWD CNTR: CPT | Mod: 26,MC

## 2024-05-14 PROCEDURE — 99285 EMERGENCY DEPT VISIT HI MDM: CPT | Mod: 25

## 2024-05-14 RX ORDER — BACLOFEN 100 %
1 POWDER (GRAM) MISCELLANEOUS
Qty: 20 | Refills: 0
Start: 2024-05-14

## 2024-05-14 RX ORDER — ACETAMINOPHEN 500 MG
1000 TABLET ORAL ONCE
Refills: 0 | Status: COMPLETED | OUTPATIENT
Start: 2024-05-14 | End: 2024-05-14

## 2024-05-14 RX ORDER — OXYCODONE AND ACETAMINOPHEN 5; 325 MG/1; MG/1
1 TABLET ORAL
Qty: 15 | Refills: 0
Start: 2024-05-14

## 2024-05-14 RX ORDER — GABAPENTIN 400 MG/1
1 CAPSULE ORAL
Qty: 30 | Refills: 0
Start: 2024-05-14 | End: 2024-06-12

## 2024-05-14 RX ORDER — MORPHINE SULFATE 50 MG/1
4 CAPSULE, EXTENDED RELEASE ORAL ONCE
Refills: 0 | Status: DISCONTINUED | OUTPATIENT
Start: 2024-05-14 | End: 2024-05-14

## 2024-05-14 RX ADMIN — Medication 400 MILLIGRAM(S): at 03:40

## 2024-05-14 RX ADMIN — MORPHINE SULFATE 4 MILLIGRAM(S): 50 CAPSULE, EXTENDED RELEASE ORAL at 07:29

## 2024-05-14 NOTE — CONSULT NOTE ADULT - SUBJECTIVE AND OBJECTIVE BOX
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 He had about 4 episodes of stool mixed with blood.  Denies fever or chills, no nausea /vomiting.  Denies any trauma, no abd pain.       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      Asthma      Closed fracture of multiple ribs of right side, initial encounter      Cocaine abuse      Chronic sinusitis      Homeless      GBS (Guillain [place] syndrome)      HIV disease      History of orthopedic surgery  left arm          MEDICATIONS  (STANDING):    MEDICATIONS  (PRN):      Allergies    Toradol (Swelling)  Ceclor (Unknown)  Toradol (Anaphylaxis; Swelling)    Intolerances        SOCIAL HISTORY:    FAMILY HISTORY:  FH: HIV infection (Mother)            Physical Exam:  General: NAD, resting comfortably  HEENT: NC/AT, EOMI, normal hearing, no oral lesions, no LAD, neck supple  Pulmonary: normal resp effort, CTA-B  Cardiovascular: NSR, no murmurs  Abdominal: soft, ND/NT, no organomegaly. ARLEEN- extensive anal warts, normal sphincter tone, no blood on examining finger.   Extremities: WWP, normal strength, no clubbing/cyanosis/edema  Neuro: A/O x 3, CNs II-XII grossly intact, normal sensation, no focal deficits  Pulses: palpable distal pulses    Vital Signs Last 24 Hrs  T(C): 36.9 (14 May 2024 05:36), Max: 36.9 (14 May 2024 05:36)  T(F): 98.4 (14 May 2024 05:36), Max: 98.4 (14 May 2024 05:36)  HR: 88 (14 May 2024 05:36) (88 - 103)  BP: 112/58 (14 May 2024 05:36) (103/66 - 129/83)  BP(mean): 70 (14 May 2024 05:36) (70 - 70)  RR: 18 (14 May 2024 05:36) (16 - 20)  SpO2: 100% (14 May 2024 05:36) (96% - 100%)    Parameters below as of 14 May 2024 05:36  Patient On (Oxygen Delivery Method): room air        I&O's Summary          LABS:                        14.2   7.17  )-----------( 301      ( 14 May 2024 02:16 )             43.4     05-14    138  |  105  |  16  ----------------------------<  85  4.0   |  28  |  0.94    Ca    9.4      14 May 2024 02:16    TPro  9.1<H>  /  Alb  4.1  /  TBili  0.4  /  DBili  x   /  AST  33  /  ALT  32  /  AlkPhos  85  05-14    PT/INR - ( 14 May 2024 02:16 )   PT: 11.5 sec;   INR: 1.02 ratio         PTT - ( 14 May 2024 02:16 )  PTT:30.1 sec  Urinalysis Basic - ( 14 May 2024 02:16 )    Color: x / Appearance: x / SG: x / pH: x  Gluc: 85 mg/dL / Ketone: x  / Bili: x / Urobili: x   Blood: x / Protein: x / Nitrite: x   Leuk Esterase: x / RBC: x / WBC x   Sq Epi: x / Non Sq Epi: x / Bacteria: x      CAPILLARY BLOOD GLUCOSE        LIVER FUNCTIONS - ( 14 May 2024 02:16 )  Alb: 4.1 g/dL / Pro: 9.1 gm/dL / ALK PHOS: 85 U/L / ALT: 32 U/L / AST: 33 U/L / GGT: x             RADIOLOGY & ADDITIONAL STUDIES:  < from: CT Abdomen and Pelvis w/wo IV Cont (05.14.24 @ 03:35) >  PROCEDURE:  CT of the Abdomen and Pelvis was performed.  Precontrast, Arterial and Delayed phases were performed.  Sagittal and coronal reformats were performed.    FINDINGS:  LOWER CHEST: A few scattered sub-4 mm nodules grossly similar to   4/26/2024; can be further assessed with nonemergent chest CT.    LIVER: Within normal limits.  BILE DUCTS: Normal caliber.  GALLBLADDER: Within normal limits.  SPLEEN: Within normal limits.  PANCREAS: Within normal limits.  ADRENALS: Within normal limits.  KIDNEYS/URETERS: Subcentimeter left hypodensity too small to   characterize. Otherwise within normal limits.    BLADDER: Within normal limits.  REPRODUCTIVE ORGANS: Prostate within normal limits.    BOWEL: No bowel obstruction. Appendix is normal. Tiny focus of arterial   enhancement in the region of the rectum (12:113), not definitely seen on   prior of 10/4/2023, may reflect a focus of active bleed.  PERITONEUM: No ascites.  VESSELS: Within normal limits.  RETROPERITONEUM/LYMPH NODES: No lymphadenopathy.  ABDOMINAL WALL: Tiny fat-containing umbilical hernia..  BONES: Within normal limits.    IMPRESSION:  Tiny focus of arterial enhancement in the region of the rectum, not   definitely seen on prior study of 10/4/2023, may reflecta focus of   active bleed.    < end of copied text >

## 2024-05-14 NOTE — ED ADULT TRIAGE NOTE - CHIEF COMPLAINT QUOTE
2021  EMPLOYEE INFORMATION: EMPLOYER INFORMATION:   NAME: Swathi VEGA    : 1997 [phone]    DATE OF INJURY/EVENT: 2021            Location: [facility]   Treating Provider: Reagan Ochoa PA-C  Time In:  9:09 AM Time Out:  9:23 AM      DIAGNOSIS:   1. Achilles tendinitis of right lower extremity      STATUS: This injury is determined to be WORK RELATED.  RETURN TO WORK:Employee may return to work with restrictions.   Return Date: 10/14/2021          RESTRICTIONS: Restrictions are to be followed at work and at home.  Restrictions are in effect until next follow-up visit.    Minimize stairs  No ladders  Brace- continue to wean off   TREATMENT PLAN:   Medications for this injury/condition:   Continue meloxicam  Referral/Consult: None  Diagnostic Testing: None       Instructions:   Ice and heat  Range of motion  biofreeze  NEXT RETURN VISIT:   about a week  1 1 @ 0830AM     Thank you for the privilege of providing medical care for this injury/condition.  If there are any questions, please call the occupational health clinic at Dept: [phone].    Electronically signed on 10/14/2021 at 9:23 AM by:   Reagan Ochoa PA-C   [facility]    The physician below agrees with the plan and restrictions placed on the patient by the provider above.  Bandar Jose MD   [place] a few days and 4 episodes of bright red stool today. Not on blood thinners. PMH of HIV. Allergy to toradol.

## 2024-05-14 NOTE — ED PROVIDER NOTE - NSFOLLOWUPINSTRUCTIONS_ED_ALL_ED_FT
Please call and follow up with your doctor in 1-3 days.    Return to the Emergency Department for worsening or persistent symptoms, and/or ANY NEW OR CONCERNING SYMPTOMS. If you have issues obtaining follow up, please call: [phone] or [phone]  to obtain a doctor or specialist who takes your insurance in your area.    Bowel regiment:  eating foods that are high in fiber  taking a stool softener or a fiber supplement such as psyllium NIH external link (Metamucil) or methylcellulose (Citrucel)  drinking water or other nonalcoholic liquids each day as recommended by your health care professional  not straining during bowel movements  not sitting on the toilet for long periods of time  taking over-the-counter pain relievers such as acetaminophen NIH external link, ibuprofen NIH external link, naproxen NIH external link, or aspirin  sitting in a tub of warm water, called a sitz bath, several times a day to help relieve pain    Rectal Bleeding    WHAT YOU NEED TO KNOW:    Rectal bleeding can be caused by constipation, hemorrhoids, or anal fissures. It may also be caused by polyps, tumors, or medical conditions, such as colitis or diverticulitis.    DISCHARGE INSTRUCTIONS:    Medicines:    Pain medicine may be needed. Do not wait until your pain is severe before you take this medicine. The medicine may not work as well at controlling your pain if you wait too long to take it. Pain medicine can make you dizzy or sleepy. Prevent falls by asking for help when you want to get out of bed.    Iron supplement: Iron helps your body make more red blood cells.    Steroids: This medicine decreases inflammation in your rectum. It may be applied as a cream, ointment, or lotion.    Take your medicine as directed. Contact your healthcare provider if you think your medicine is not helping or if you have side effects. Tell your provider if you are allergic to any medicine. Keep a list of the medicines, vitamins, and herbs you take. Include the amounts, and when and why you take them. Bring the list or the pill bottles to follow-up visits. Carry your medicine list with you in case of an emergency.  Follow up with your doctor as directed: Write down your questions so you remember to ask them during your visits.    Drink liquids as directed: Ask your healthcare provider how much liquid to drink each day and which liquids are best for you. This will help prevent dehydration and constipation.    Contact your healthcare provider if:    You have a fever.    Your rectal bleeding stopped for a time, but has started again.    You have nausea.    You have cold, sweaty, pale skin.    You have changes in your bowel movements, such as diarrhea.    You have questions or concerns about your condition or care.  Seek care immediately or call 911 if:    You are breathing faster than usual.    You are dizzy, lightheaded, or feel faint.    You are confused or cannot think clearly.    You urinate less than usual or not at all.    Your rectal bleeding is constant or heavy.    You have severe abdominal pain or cramping.        Back Pain    WHAT YOU NEED TO KNOW:    Back pain is common. It can be caused by many conditions, such as arthritis or the breakdown of spinal discs. Your risk for back pain is increased by injuries, lack of activity, or repeated bending and twisting. You may feel sore or stiff on one or both sides of your back. The pain may spread to your buttocks or thighs.    DISCHARGE INSTRUCTIONS:    Return to the emergency department if:     You have pain, numbness, or weakness in one or both legs.      Your pain becomes so severe that you cannot walk.      You cannot control your urine or bowel movements.      You have severe back pain with chest pain.      You have severe back pain, nausea, and vomiting.      You have severe back pain that spreads to your side or genital area.    Contact your healthcare provider if:     You have back pain that does not get better with rest and pain medicine.      You have a fever.      You have pain that worsens when you are on your back or when you rest.      You have pain that worsens when you cough or sneeze.      You lose weight without trying.      You have questions or concerns about your condition or care.    Medicines:     NSAIDs help decrease swelling and pain. This medicine is available with or without a doctor's order. NSAIDs can cause stomach bleeding or kidney problems in certain people. If you take blood thinner medicine, always ask your healthcare provider if NSAIDs are safe for you. Always read the medicine label and follow directions.      Acetaminophen decreases pain and fever. It is available without a doctor's order. Ask how much to take and how often to take it. Follow directions. Read the labels of all other medicines you are using to see if they also contain acetaminophen, or ask your doctor or pharmacist. Acetaminophen can cause liver damage if not taken correctly. Do not use more than 4 grams (4,000 milligrams) total of acetaminophen in one day.       Muscle relaxers help decrease muscle spasms and back pain.      Prescription pain medicine may be given. Ask your healthcare provider how to take this medicine safely. Some prescription pain medicines contain acetaminophen. Do not take other medicines that contain acetaminophen without talking to your healthcare provider. Too much acetaminophen may cause liver damage. Prescription pain medicine may cause constipation. Ask your healthcare provider how to prevent or treat constipation.       Take your medicine as directed. Contact your healthcare provider if you think your medicine is not helping or if you have side effects. Tell him or her if you are allergic to any medicine. Keep a list of the medicines, vitamins, and herbs you take. Include the amounts, and when and why you take them. Bring the list or the pill bottles to follow-up visits. Carry your medicine list with you in case of an emergency.    How to manage your back pain:     Apply ice on your back for 15 to 20 minutes every hour or as directed. Use an ice pack, or put crushed ice in a plastic bag. Cover it with a towel before you apply it to your skin. Ice helps prevent tissue damage and decreases pain.      Apply heat on your back for 20 to 30 minutes every 2 hours for as many days as directed. Heat helps decrease pain and muscle spasms.      Stay active as much as you can without causing more pain. Bed rest could make your back pain worse. Avoid heavy lifting until your pain is gone.      Go to physical therapy as directed. A physical therapist can teach you exercises to help improve movement and strength, and to decrease pain.    Follow up with your healthcare provider in 2 weeks, or as directed: Write down your questions so you remember to ask them during your visits.

## 2024-05-14 NOTE — CONSULT NOTE ADULT - ASSESSMENT
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    No clinical evidence of active rectal bleeding: hemodynamically stable, no blood seen on [facility]EN    Recommendation   No acute surgical intervention   Pain control   Bowel regimen and sitz bath prn   Cleared from CRS perspective for d/c       Discussed with Attending

## 2024-05-14 NOTE — ED ADULT NURSE REASSESSMENT NOTE - NS ED NURSE REASSESS COMMENT FT1
pt requesting medicaid cab, pt instructed to provide address so  can schedule a ride. Pt in the waiting room. Avril MENDEZ made aware.

## 2024-05-14 NOTE — ED PROVIDER NOTE - PROGRESS NOTE DETAILS
ED Attending Dr. Weinberg, colo rectal eval pt and pt cleared for d/c.  Pt updated on plan.  States does not have his medication for back pain.  Will send script for baclofen, gabapentin and percocet.  Pt does have his HIV medications Pt told to follow up with PMD.

## 2024-05-14 NOTE — ED PROVIDER NOTE - PATIENT PORTAL LINK FT
You can access the FollowMyHealth Patient Portal offered by [facility] by registering at the following website: [email or web address]. By joining [employer]’s FollowMyHealth portal, you will also be able to view your health information using other applications (apps) compatible with our system.

## 2024-05-14 NOTE — ED ADULT NURSE NOTE - OBJECTIVE STATEMENT
Pt is 34y male, A&Ox4, breathing unlabored, presenting ambulatory to  ED with c/o bloody diarrhea x1 episode. 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 Patient states he was recently hospitalized at [facility] for lower extremity weakness.  Denies fever / chills.  denies nausea / vomiting.  Patient also reports slight abdominal pain.  No urinary symptoms. MD Chaudhary at bedside.

## 2024-05-14 NOTE — ED ADULT NURSE NOTE - CHIEF COMPLAINT QUOTE
[place] a few days and 4 episodes of bright red stool today. Not on blood thinners. PMH of HIV. Allergy to toradol.

## 2024-05-14 NOTE — ED PROVIDER NOTE - OBJECTIVE STATEMENT
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  Symptoms x 1 day.  Patient states he had a recent hospitalization for send at [facility] for lower extremity weakness.  Denies fever or chills.  No nausea vomiting.  Patient also reports slight abdominal pain.  No urinary symptoms.

## 2024-05-14 NOTE — ED ADULT NURSE NOTE - HIV MEDS
The patient has been examined and the H&P has been reviewed:    I concur with the findings and no changes have occurred since H&P was written.   This patient has been cleared for surgery in an ambulatory surgical facility    ASA 3,  Mallampatti Score 3  No history of anesthetic complications  Plan for RN IV sedation      Anesthesia/Surgery risks, benefits and alternative options discussed and understood by patient/family.          Active Hospital Problems    Diagnosis  POA    Cervical radiculitis [M54.12]  Yes      Resolved Hospital Problems    Diagnosis Date Resolved POA   No resolved problems to display.     
Yes

## 2024-05-14 NOTE — ED PROVIDER NOTE - CLINICAL SUMMARY MEDICAL DECISION MAKING FREE TEXT BOX
Reports patient with episode of bloody diarrhea today recent hospitalization [place] for Enterorhinovirus from possible flare of his Guillain-Barré,Will CT abdomen will check basic labs and reassess.

## 2024-05-14 NOTE — ED ADULT NURSE REASSESSMENT NOTE - NS ED NURSE REASSESS COMMENT FT1
Pt received from ANDREA ADKINS. Pain medication was administered as per eMAR. Warm blanket was given as per pt request. No further complaints or discomfort at this time.

## 2024-05-15 ENCOUNTER — TRANSCRIPTION ENCOUNTER (OUTPATIENT)
Age: 34
End: 2024-05-15

## 2024-05-19 ENCOUNTER — EMERGENCY (EMERGENCY)
Facility: HOSPITAL | Age: 34
LOS: 1 days | Discharge: HOME | End: 2024-05-19
Attending: EMERGENCY MEDICINE
Payer: COMMERCIAL

## 2024-05-19 VITALS
HEART RATE: 80 BPM | HEIGHT: 73 IN | RESPIRATION RATE: 16 BRPM | OXYGEN SATURATION: 99 % | SYSTOLIC BLOOD PRESSURE: 136 MMHG | DIASTOLIC BLOOD PRESSURE: 86 MMHG | TEMPERATURE: 98 F

## 2024-05-19 PROCEDURE — 99282 EMERGENCY DEPT VISIT SF MDM: CPT

## 2024-05-19 PROCEDURE — 99283 EMERGENCY DEPT VISIT LOW MDM: CPT

## 2024-05-19 NOTE — ED ADULT TRIAGE NOTE - CHIEF COMPLAINT QUOTE
patient complaining of lower back pain, non radiating, since this morning, stated he woke up this way, patient denied radiating down legs, stated he has chronic L4/L5 hernations, stated he is able to ambulate slowly at home

## 2024-05-19 NOTE — ED PROVIDER NOTE - ATTENDING APP SHARED VISIT CONTRIBUTION OF CARE
Nicolas STEVENSJS-69-[id number]-old male with history of herniated disc x 2 [place] train [place] and had back pain and was not able to walk too much so decided to call ambulance.  Patient says that his back pain is better and would like to request a taxi to go back home.  No fall no trauma.    Patient is alert well-appearing male, S1-S2 normal regular, bilateral clear breath sounds, abdomen soft nontender nondistended, no midline lumbar tenderness, No saddle anesthesia no neuroexam is normal able to walk, slr Negative bilaterally    Plan to discharge and provide with medicaid taxi

## 2024-05-19 NOTE — ED PROVIDER NOTE - PATIENT PORTAL LINK FT
You can access the FollowMyHealth Patient Portal offered by [facility] by registering at the following website: [email or web address]. By joining [employer]’s FollowMyHealth portal, you will also be able to view your health information using other applications (apps) compatible with our system.

## 2024-05-19 NOTE — ED CLERICAL - NS ED CLERK NOTE PRE-ARRIVAL INFORMATION; ADDITIONAL PRE-ARRIVAL INFORMATION
This patient is enrolled in the readmission program and has active care navigation. This patient can be followed up by the care navigation team within 24 hours. To arrange close follow-up or to obtain additional clinical information about this patient, please call the contact number above.   Please call the hospitalist as needed to collaborate on further medical management ([phone])

## 2024-05-19 NOTE — ED PROVIDER NOTE - OBJECTIVE STATEMENT
34 year old male with hx of back pain presented to ED c/o back pain. atraumatic. states he took his meds this morning, tylenol and robaxin, also has nighttime doeses he ahs not taken yet. however now states pain is controlled at time of eval. requesting medicaid taxi. denies saddle aesthesia, incontinence, fever/chills, abd pain

## 2024-05-20 ENCOUNTER — EMERGENCY (EMERGENCY)
Facility: HOSPITAL | Age: 34
LOS: 1 days | End: 2024-05-20
Attending: STUDENT IN AN ORGANIZED HEALTH CARE EDUCATION/TRAINING PROGRAM
Payer: COMMERCIAL

## 2024-05-20 VITALS
OXYGEN SATURATION: 98 % | DIASTOLIC BLOOD PRESSURE: 68 MMHG | SYSTOLIC BLOOD PRESSURE: 117 MMHG | HEART RATE: 78 BPM | TEMPERATURE: 98 F | RESPIRATION RATE: 16 BRPM

## 2024-05-20 VITALS
HEIGHT: 73 IN | WEIGHT: 210.1 LBS | OXYGEN SATURATION: 100 % | DIASTOLIC BLOOD PRESSURE: 89 MMHG | RESPIRATION RATE: 18 BRPM | TEMPERATURE: 98 F | SYSTOLIC BLOOD PRESSURE: 134 MMHG | HEART RATE: 75 BPM

## 2024-05-20 DIAGNOSIS — Z98.890 OTHER SPECIFIED POSTPROCEDURAL STATES: Chronic | ICD-10-CM

## 2024-05-20 LAB
ALBUMIN SERPL ELPH-MCNC: 4 G/DL — SIGNIFICANT CHANGE UP (ref 3.3–5.2)
ALP SERPL-CCNC: 83 U/L — SIGNIFICANT CHANGE UP (ref 40–120)
ALT FLD-CCNC: 12 U/L — SIGNIFICANT CHANGE UP
ANION GAP SERPL CALC-SCNC: 12 MMOL/L — SIGNIFICANT CHANGE UP (ref 5–17)
APTT BLD: 29.4 SEC — SIGNIFICANT CHANGE UP (ref 24.5–35.6)
AST SERPL-CCNC: 16 U/L — SIGNIFICANT CHANGE UP
BASOPHILS # BLD AUTO: 0.02 K/UL — SIGNIFICANT CHANGE UP (ref 0–0.2)
BASOPHILS NFR BLD AUTO: 0.4 % — SIGNIFICANT CHANGE UP (ref 0–2)
BILIRUB SERPL-MCNC: 0.4 MG/DL — SIGNIFICANT CHANGE UP (ref 0.4–2)
BLD GP AB SCN SERPL QL: SIGNIFICANT CHANGE UP
BUN SERPL-MCNC: 11.2 MG/DL — SIGNIFICANT CHANGE UP (ref 8–20)
CALCIUM SERPL-MCNC: 9 MG/DL — SIGNIFICANT CHANGE UP (ref 8.4–10.5)
CHLORIDE SERPL-SCNC: 103 MMOL/L — SIGNIFICANT CHANGE UP (ref 96–108)
CO2 SERPL-SCNC: 26 MMOL/L — SIGNIFICANT CHANGE UP (ref 22–29)
CREAT SERPL-MCNC: 0.92 MG/DL — SIGNIFICANT CHANGE UP (ref 0.5–1.3)
EGFR: 111 ML/MIN/1.73M2 — SIGNIFICANT CHANGE UP
EOSINOPHIL # BLD AUTO: 0.07 K/UL — SIGNIFICANT CHANGE UP (ref 0–0.5)
EOSINOPHIL NFR BLD AUTO: 1.3 % — SIGNIFICANT CHANGE UP (ref 0–6)
GLUCOSE SERPL-MCNC: 86 MG/DL — SIGNIFICANT CHANGE UP (ref 70–99)
HCT VFR BLD CALC: 37.8 % — LOW (ref 39–50)
HGB BLD-MCNC: 12.2 G/DL — LOW (ref 13–17)
IMM GRANULOCYTES NFR BLD AUTO: 0.2 % — SIGNIFICANT CHANGE UP (ref 0–0.9)
INR BLD: 1.08 RATIO — SIGNIFICANT CHANGE UP (ref 0.85–1.18)
LIDOCAIN IGE QN: 20 U/L — LOW (ref 22–51)
LYMPHOCYTES # BLD AUTO: 2.25 K/UL — SIGNIFICANT CHANGE UP (ref 1–3.3)
LYMPHOCYTES # BLD AUTO: 43.2 % — SIGNIFICANT CHANGE UP (ref 13–44)
MCHC RBC-ENTMCNC: 29.5 PG — SIGNIFICANT CHANGE UP (ref 27–34)
MCHC RBC-ENTMCNC: 32.3 GM/DL — SIGNIFICANT CHANGE UP (ref 32–36)
MCV RBC AUTO: 91.3 FL — SIGNIFICANT CHANGE UP (ref 80–100)
MONOCYTES # BLD AUTO: 0.39 K/UL — SIGNIFICANT CHANGE UP (ref 0–0.9)
MONOCYTES NFR BLD AUTO: 7.5 % — SIGNIFICANT CHANGE UP (ref 2–14)
NEUTROPHILS # BLD AUTO: 2.47 K/UL — SIGNIFICANT CHANGE UP (ref 1.8–7.4)
NEUTROPHILS NFR BLD AUTO: 47.4 % — SIGNIFICANT CHANGE UP (ref 43–77)
PLATELET # BLD AUTO: 280 K/UL — SIGNIFICANT CHANGE UP (ref 150–400)
POTASSIUM SERPL-MCNC: 3.7 MMOL/L — SIGNIFICANT CHANGE UP (ref 3.5–5.3)
POTASSIUM SERPL-SCNC: 3.7 MMOL/L — SIGNIFICANT CHANGE UP (ref 3.5–5.3)
PROT SERPL-MCNC: 7.1 G/DL — SIGNIFICANT CHANGE UP (ref 6.6–8.7)
PROTHROM AB SERPL-ACNC: 12 SEC — SIGNIFICANT CHANGE UP (ref 9.5–13)
RBC # BLD: 4.14 M/UL — LOW (ref 4.2–5.8)
RBC # FLD: 12.8 % — SIGNIFICANT CHANGE UP (ref 10.3–14.5)
SODIUM SERPL-SCNC: 140 MMOL/L — SIGNIFICANT CHANGE UP (ref 135–145)
WBC # BLD: 5.21 K/UL — SIGNIFICANT CHANGE UP (ref 3.8–10.5)
WBC # FLD AUTO: 5.21 K/UL — SIGNIFICANT CHANGE UP (ref 3.8–10.5)

## 2024-05-20 PROCEDURE — 99284 EMERGENCY DEPT VISIT MOD MDM: CPT | Mod: 25

## 2024-05-20 PROCEDURE — 86850 RBC ANTIBODY SCREEN: CPT

## 2024-05-20 PROCEDURE — 80053 COMPREHEN METABOLIC PANEL: CPT

## 2024-05-20 PROCEDURE — 83690 ASSAY OF LIPASE: CPT

## 2024-05-20 PROCEDURE — 85610 PROTHROMBIN TIME: CPT

## 2024-05-20 PROCEDURE — 36415 COLL VENOUS BLD VENIPUNCTURE: CPT

## 2024-05-20 PROCEDURE — 85025 COMPLETE CBC W/AUTO DIFF WBC: CPT

## 2024-05-20 PROCEDURE — 85730 THROMBOPLASTIN TIME PARTIAL: CPT

## 2024-05-20 PROCEDURE — 99284 EMERGENCY DEPT VISIT MOD MDM: CPT

## 2024-05-20 PROCEDURE — 86901 BLOOD TYPING SEROLOGIC RH(D): CPT

## 2024-05-20 PROCEDURE — 86900 BLOOD TYPING SEROLOGIC ABO: CPT

## 2024-05-20 NOTE — ED ADULT TRIAGE NOTE - CHIEF COMPLAINT QUOTE
pt recently discharged 2 hr ago for back pain. had a bright red bloody bowel movement while waiting for cab in waiting room, also c/o central abd pain x30 min.  denies nvd, lightheadedness, sob, hx hemorrhoids

## 2024-05-20 NOTE — ED PROVIDER NOTE - ATTENDING CONTRIBUTION TO CARE
I, Watson York, personally saw the patient with the resident, and completed the key components of the history and physical exam. I then discussed the management plan with the resident. 34M with congenital HIV, on ART, Guillain [place] Syndrome 2018, recent hospitalization for enterovirus and rhinovirus presents with  bright red blood in stool while at the [facility] ED being evaluated for back pain. Patient also endorses LLQ pain. Patient reports blood in stool about 6 days ago for which he was seen at the [place] ED, CT abd showed tiny focus of arterial enhancement in the region of the rectum suggesting rectal bleed for which colorectal surgery was consulted and patient was cleared for discharge because no active bleeding since then. Patient denies any additional symptoms.    IWatson, personally saw the patient with the resident, and completed the key components of the history and physical exam. I then discussed the management plan with the resident.

## 2024-05-20 NOTE — ED PROVIDER NOTE - PATIENT PORTAL LINK FT
You can access the FollowMyHealth Patient Portal offered by [facility] by registering at the following website: [email or web address]. By joining [employer]’s FollowMyHealth portal, you will also be able to view your health information using other applications (apps) compatible with our system.

## 2024-05-20 NOTE — ED PROVIDER NOTE - CLINICAL SUMMARY MEDICAL DECISION MAKING FREE TEXT BOX
34M with congenital HIV, on ART, Guillain [place] Syndrome 2018, recent hospitalization for enterovirus and rhinovirus with hx of rectal bleed 1 week ago, presents with blood in stool.     VSS. Will get CT abdomen to assess rectal bleed and CBC to assess anemia. 34M with congenital HIV, on ART, Guillain [place] Syndrome 2018, recent hospitalization for enterovirus and rhinovirus with hx of rectal bleed 1 week ago, presents with blood in stool.     VSS. Will get CBC, CMP to assess anemia, pancreatitis. Will monitor for additional episodes of blood per rectum. 34M with congenital HIV, on ART, Guillain [place] Syndrome 2018, recent hospitalization for enterovirus and rhinovirus with hx of rectal bleed 1 week ago, presents with blood in stool.     VSS. Will get CBC, CMP to assess anemia, pancreatitis. Will monitor for additional episodes of blood per rectum.    Patient with no additional episodes of blood in stool. H/H stable, bloodwork negative for acute pathology. Stable for discharge home with PCP.

## 2024-05-20 NOTE — ED ADULT NURSE NOTE - OBJECTIVE STATEMENT
pt comes to ED A&Ox4, c/o abdominal pain and bloody stool. hx of hemmorhoids. no other complaints of pain or discomfort at this time.

## 2024-05-20 NOTE — ED PROVIDER NOTE - PHYSICAL EXAMINATION
General: NAD, well appearing  HEENT: Normocephalic, atraumatic  Neck: No apparent stiffness or JVD  Pulm: Chest wall symmetric and nontender, lungs clear to ascultation   Cardiac: Regular rate and regular rhythm  Abdomen: Nontender and nondistended  Skin: Skin is warm, dry and intact without rashes or lesions.  Neuro: No motor or sensory deficits, CN 2-12 intact  MSK: No deformity or tenderness  Psych: calm, cooperative

## 2024-05-20 NOTE — ED PROVIDER NOTE - NSICDXPASTMEDICALHX_GEN_ALL_CORE_FT
PAST MEDICAL HISTORY:  Asthma     Chronic sinusitis     Closed fracture of multiple ribs of right side, initial encounter     Cocaine abuse     GBS (Guillain [place] syndrome)     HIV (human immunodeficiency virus infection) from birth    HIV disease     Homeless

## 2024-05-20 NOTE — ED PROVIDER NOTE - NSFOLLOWUPINSTRUCTIONS_ED_ALL_ED_FT
1) Return to ED if bleeding worsens or new symptoms develop  2) F/u with PCP within 1 week    Lower Gastrointestinal Bleeding  Body outline showing the colon and the rectum.   Lower gastrointestinal (GI) bleeding is bleeding from the colon, the rectum, or the anus. The colon is the last part of the digestive tract, where stool (feces) is formed. A person with lower GI bleeding may see blood in the stool. The blood may be bright red.    Lower GI bleeding often stops without treatment. Heavy bleeding and bleeding that does not go away may be life-threatening and may need emergency treatment at a hospital.    What are the causes?  This condition may be caused by:  Diverticulosis. This condition causes pouches to form in your colon over time.  Diverticulitis. This is inflammation in areas where diverticulosis has occurred.  Inflammatory bowel disease (IBD), or inflammation of the colon.  Hemorrhoids, or swollen veins in the rectum.  Anal fissures, or painful tears in the anus. Tears are often caused by passing hard stools.  Cancer of the colon or rectum, or polyps of the colon or rectum. Polyps are noncancerous growths.  Coagulopathy. This is a disorder that makes it hard to form blood clots. This condition also causes easy bleeding.  Arteriovenous malformation. This is an abnormal weakening of a blood vessel where an artery and a vein come together.  What increases the risk?  The following factors may make you more likely to develop this condition:  Being older than age 60.  Regular use of aspirin or NSAIDs, such as ibuprofen.  Taking blood thinners (anticoagulants) or anti-platelet medicines.  Having a history of high-dose X-ray treatment (radiation therapy) of the colon.  Having recently had a colon polyp removed.  Heavy use of alcohol, or use of nicotine products.  What are the signs or symptoms?  Symptoms of this condition include:  Bright red blood or blood clots coming from your rectum.  Bloody stools.  Black or maroon-colored stools.  Pain or cramping in your abdomen.  Weakness or dizziness.  Racing heartbeat.  How is this diagnosed?  This condition may be diagnosed based on:  Your symptoms and medical history.  A physical exam. During the exam, your health care provider will check for signs of blood loss, such as low blood pressure and a fast pulse.  Tests or procedures. These may include:  Flexible sigmoidoscopy. A flexible tube with a camera is used to check your anus and colon to look for the source of bleeding.  Colonoscopy. This is similar to a flexible sigmoidoscopy, but the camera can extend all the way to the uppermost part of your colon.  Blood tests to measure your red blood cell count and to check for coagulopathy.  Angiogram. For this test, X-rays of your colon are taken after a dye or radioactive substance is injected into your bloodstream. This may be done to look for a bleeding site.  How is this treated?  Treatment for this condition depends on the cause of your bleeding. Heavy or ongoing bleeding is treated at a hospital. Treatment may include:  Getting fluids through an IV inserted into one of your veins.  Getting blood through an IV (blood transfusion).  Stopping bleeding with high heat (cauterization), injections of certain medicines, or surgical clips. This can all be done during a colonoscopy.  Having a procedure that involves first doing an angiogram and then blocking blood flow to the bleeding site (embolization).  Stopping some of your regular medicines for a certain amount of time.  Having surgery to remove part of your colon. This may be needed if bleeding is severe and does not lessen after other treatment.  Follow these instructions at home:  Eating and drinking    A sign telling the reader not to drink beer, wine, or hard liquor.  Eat foods that are high in fiber. This will help keep your stools soft. These foods include whole grains, legumes, fruits, and vegetables. Eating 1–3 prunes a day works well for many people.  Drink enough fluid to keep your urine pale yellow.  Do not drink alcohol.  General instructions    A sign showing that a person should not smoke.  Do not use any products that contain nicotine or tobacco. These products include cigarettes, chewing tobacco, and vaping devices, such as e-cigarettes. If you need help quitting, ask your health care provider.  Take over-the-counter and prescription medicines only as told by your health care provider. You may need to avoid aspirin, NSAIDs, or other medicines that increase bleeding.  Return to your normal activities as told by your health care provider. Ask your health care provider what activities are safe for you.  Keep all follow-up visits. This is important.  Contact a health care provider if:  Your symptoms do not improve.  You feel nauseous, or you vomit.  You have pain in your abdomen.  You feel weak or dizzy.  You need help to stop smoking or drinking alcohol.  Get help right away if:  Your bleeding increases.  You have severe weakness or you faint.  You have sudden or severe cramps in your back or abdomen.  You vomit blood.  You pass large blood clots in your stool.  Any of your other symptoms get worse.  These symptoms may be an emergency. Get help right away. Call 911.  Do not wait to see if the symptoms will go away.  Do not drive yourself to the hospital.  Summary  If you have lower gastrointestinal (GI) bleeding, you may see blood in or on your stool (feces). The blood may be bright red.  Take over-the-counter and prescription medicines only as told by your health care provider. You may need to avoid aspirin, NSAIDs, or other medicines that increase bleeding.  Keep all follow-up visits. This is important.  Get help right away if your symptoms get worse.

## 2024-05-20 NOTE — ED PROVIDER NOTE - PROGRESS NOTE DETAILS
Patient with no additional episodes of blood in stool. H/H stable, bloodwork negative for acute pathology. Stable for discharge home with PCP.

## 2024-05-20 NOTE — ED ADULT NURSE NOTE - NSICDXPASTMEDICALHX_GEN_ALL_CORE_FT
PAST MEDICAL HISTORY:  Asthma     Chronic sinusitis     Closed fracture of multiple ribs of right side, initial encounter     Cocaine abuse     GBS (Guillain [place] syndrome)     HIV (human immunodeficiency virus infection) from birth    HIV disease     Homeless

## 2024-05-20 NOTE — ED PROVIDER NOTE - OBJECTIVE STATEMENT
34M with congenital HIV, on ART, Guillain [place] Syndrome 2018, recent hospitalization for enterovirus and rhinovirus presents with  bright red blood in stool while at the [facility] ED being evaluated for back pain. Patient also endorses LLQ pain. Patient reports blood in stool about 6 days ago for which he was seen at the [place] ED, CT abd showed tiny focus of arterial enhancement in the region of the rectum suggesting rectal bleed for which colorectal surgery was consulted and patient was cleared for discharge because no active bleeding since then. Denies dizziness, N/V, fever, chills, LE edema. 34M with congenital HIV, on ART, Guillain [place] Syndrome 2018, recent hospitalization for enterovirus and rhinovirus presents with  bright red blood in stool while at the [facility] ED being evaluated for back pain. Patient also endorses LLQ pain. Patient reports blood in stool about 6 days ago for which he was seen at the [place] ED, CT abd showed tiny focus of arterial enhancement in the region of the rectum suggesting rectal bleed for which colorectal surgery was consulted and patient was cleared for discharge because no active bleeding since then. Denies dizziness, SOB, N/V, fever, chills, LE edema.

## 2024-05-22 ENCOUNTER — INPATIENT (INPATIENT)
Facility: HOSPITAL | Age: 34
LOS: 5 days | Discharge: HOME | DRG: 861 | End: 2024-05-28
Attending: HOSPITALIST | Admitting: INTERNAL MEDICINE
Payer: MEDICAID

## 2024-05-22 VITALS
SYSTOLIC BLOOD PRESSURE: 139 MMHG | RESPIRATION RATE: 18 BRPM | OXYGEN SATURATION: 100 % | DIASTOLIC BLOOD PRESSURE: 82 MMHG | HEART RATE: 88 BPM | WEIGHT: 160.06 LBS | TEMPERATURE: 98 F | HEIGHT: 73 IN

## 2024-05-22 DIAGNOSIS — Z98.890 OTHER SPECIFIED POSTPROCEDURAL STATES: Chronic | ICD-10-CM

## 2024-05-22 PROCEDURE — 99285 EMERGENCY DEPT VISIT HI MDM: CPT | Mod: 25

## 2024-05-22 NOTE — ED ADULT TRIAGE NOTE - CHIEF COMPLAINT QUOTE
Pt BIBEMS c/o numbness and weakness in both legs starting x3 days ago. Pt reports he was diagnosed with Guillain-barre in 2018 and experiences flare ups where he becomes weak. Pt states that this weakness has "lasted longer than usual and he can't get up to walk to the bathroom." Pt c/o lower back and b/l leg pain. No s/s of distress in triage. Pt BIBEMS c/o numbness and weakness in both legs starting x3 days ago. Pt reports he was diagnosed with Guillain-barre in 2018 and experiences flare ups where he becomes weak. Pt states that this weakness has "lasted longer than usual and he can't get up to walk to the bathroom." Pt c/o lower back and b/l leg pain. No s/s of distress in triage. PMHx HIV

## 2024-05-23 DIAGNOSIS — Z87.39 PERSONAL HISTORY OF OTHER DISEASES OF THE MUSCULOSKELETAL SYSTEM AND CONNECTIVE TISSUE: ICD-10-CM

## 2024-05-23 DIAGNOSIS — M54.9 DORSALGIA, UNSPECIFIED: ICD-10-CM

## 2024-05-23 DIAGNOSIS — R53.1 WEAKNESS: ICD-10-CM

## 2024-05-23 LAB
ALBUMIN SERPL ELPH-MCNC: 3.9 G/DL — SIGNIFICANT CHANGE UP (ref 3.3–5)
ALP SERPL-CCNC: 92 U/L — SIGNIFICANT CHANGE UP (ref 40–120)
ALT FLD-CCNC: 23 U/L — SIGNIFICANT CHANGE UP (ref 12–78)
ANION GAP SERPL CALC-SCNC: 6 MMOL/L — SIGNIFICANT CHANGE UP (ref 5–17)
AST SERPL-CCNC: 18 U/L — SIGNIFICANT CHANGE UP (ref 15–37)
BASOPHILS # BLD AUTO: 0.02 K/UL — SIGNIFICANT CHANGE UP (ref 0–0.2)
BASOPHILS NFR BLD AUTO: 0.3 % — SIGNIFICANT CHANGE UP (ref 0–2)
BILIRUB SERPL-MCNC: 0.3 MG/DL — SIGNIFICANT CHANGE UP (ref 0.2–1.2)
BUN SERPL-MCNC: 21 MG/DL — SIGNIFICANT CHANGE UP (ref 7–23)
CALCIUM SERPL-MCNC: 9.8 MG/DL — SIGNIFICANT CHANGE UP (ref 8.5–10.1)
CHLORIDE SERPL-SCNC: 110 MMOL/L — HIGH (ref 96–108)
CO2 SERPL-SCNC: 27 MMOL/L — SIGNIFICANT CHANGE UP (ref 22–31)
CREAT SERPL-MCNC: 0.96 MG/DL — SIGNIFICANT CHANGE UP (ref 0.5–1.3)
EGFR: 106 ML/MIN/1.73M2 — SIGNIFICANT CHANGE UP
EOSINOPHIL # BLD AUTO: 0.04 K/UL — SIGNIFICANT CHANGE UP (ref 0–0.5)
EOSINOPHIL NFR BLD AUTO: 0.6 % — SIGNIFICANT CHANGE UP (ref 0–6)
GLUCOSE SERPL-MCNC: 120 MG/DL — HIGH (ref 70–99)
HCT VFR BLD CALC: 39.1 % — SIGNIFICANT CHANGE UP (ref 39–50)
HGB BLD-MCNC: 12.9 G/DL — LOW (ref 13–17)
IMM GRANULOCYTES NFR BLD AUTO: 0.2 % — SIGNIFICANT CHANGE UP (ref 0–0.9)
LYMPHOCYTES # BLD AUTO: 2.48 K/UL — SIGNIFICANT CHANGE UP (ref 1–3.3)
LYMPHOCYTES # BLD AUTO: 40.2 % — SIGNIFICANT CHANGE UP (ref 13–44)
MCHC RBC-ENTMCNC: 29.8 PG — SIGNIFICANT CHANGE UP (ref 27–34)
MCHC RBC-ENTMCNC: 33 GM/DL — SIGNIFICANT CHANGE UP (ref 32–36)
MCV RBC AUTO: 90.3 FL — SIGNIFICANT CHANGE UP (ref 80–100)
MONOCYTES # BLD AUTO: 0.49 K/UL — SIGNIFICANT CHANGE UP (ref 0–0.9)
MONOCYTES NFR BLD AUTO: 7.9 % — SIGNIFICANT CHANGE UP (ref 2–14)
NEUTROPHILS # BLD AUTO: 3.13 K/UL — SIGNIFICANT CHANGE UP (ref 1.8–7.4)
NEUTROPHILS NFR BLD AUTO: 50.8 % — SIGNIFICANT CHANGE UP (ref 43–77)
PLATELET # BLD AUTO: 302 K/UL — SIGNIFICANT CHANGE UP (ref 150–400)
POTASSIUM SERPL-MCNC: 3.4 MMOL/L — LOW (ref 3.5–5.3)
POTASSIUM SERPL-SCNC: 3.4 MMOL/L — LOW (ref 3.5–5.3)
PROT SERPL-MCNC: 8.1 GM/DL — SIGNIFICANT CHANGE UP (ref 6–8.3)
RBC # BLD: 4.33 M/UL — SIGNIFICANT CHANGE UP (ref 4.2–5.8)
RBC # FLD: 12.9 % — SIGNIFICANT CHANGE UP (ref 10.3–14.5)
SODIUM SERPL-SCNC: 143 MMOL/L — SIGNIFICANT CHANGE UP (ref 135–145)
WBC # BLD: 6.17 K/UL — SIGNIFICANT CHANGE UP (ref 3.8–10.5)
WBC # FLD AUTO: 6.17 K/UL — SIGNIFICANT CHANGE UP (ref 3.8–10.5)

## 2024-05-23 PROCEDURE — 82728 ASSAY OF FERRITIN: CPT

## 2024-05-23 PROCEDURE — 99223 1ST HOSP IP/OBS HIGH 75: CPT

## 2024-05-23 PROCEDURE — 82746 ASSAY OF FOLIC ACID SERUM: CPT

## 2024-05-23 PROCEDURE — 83540 ASSAY OF IRON: CPT

## 2024-05-23 PROCEDURE — 97530 THERAPEUTIC ACTIVITIES: CPT | Mod: GP

## 2024-05-23 PROCEDURE — 97163 PT EVAL HIGH COMPLEX 45 MIN: CPT | Mod: GP

## 2024-05-23 PROCEDURE — 97116 GAIT TRAINING THERAPY: CPT | Mod: GP

## 2024-05-23 PROCEDURE — 83550 IRON BINDING TEST: CPT

## 2024-05-23 PROCEDURE — 84100 ASSAY OF PHOSPHORUS: CPT

## 2024-05-23 PROCEDURE — 85018 HEMOGLOBIN: CPT

## 2024-05-23 PROCEDURE — 85014 HEMATOCRIT: CPT

## 2024-05-23 PROCEDURE — 80048 BASIC METABOLIC PNL TOTAL CA: CPT

## 2024-05-23 PROCEDURE — 83735 ASSAY OF MAGNESIUM: CPT

## 2024-05-23 PROCEDURE — 82607 VITAMIN B-12: CPT

## 2024-05-23 PROCEDURE — 36415 COLL VENOUS BLD VENIPUNCTURE: CPT

## 2024-05-23 RX ORDER — GABAPENTIN 400 MG/1
3 CAPSULE ORAL
Refills: 0 | DISCHARGE

## 2024-05-23 RX ORDER — BACLOFEN 100 %
5 POWDER (GRAM) MISCELLANEOUS EVERY 8 HOURS
Refills: 0 | Status: DISCONTINUED | OUTPATIENT
Start: 2024-05-23 | End: 2024-05-28

## 2024-05-23 RX ORDER — BICTEGRAVIR SODIUM, EMTRICITABINE, AND TENOFOVIR ALAFENAMIDE FUMARATE 30; 120; 15 MG/1; MG/1; MG/1
1 TABLET ORAL DAILY
Refills: 0 | Status: DISCONTINUED | OUTPATIENT
Start: 2024-05-23 | End: 2024-05-28

## 2024-05-23 RX ORDER — ONDANSETRON 8 MG/1
4 TABLET, FILM COATED ORAL EVERY 6 HOURS
Refills: 0 | Status: DISCONTINUED | OUTPATIENT
Start: 2024-05-23 | End: 2024-05-28

## 2024-05-23 RX ORDER — METHOCARBAMOL 500 MG/1
1 TABLET, FILM COATED ORAL
Refills: 0 | DISCHARGE

## 2024-05-23 RX ORDER — SODIUM CHLORIDE 9 MG/ML
3 INJECTION INTRAMUSCULAR; INTRAVENOUS; SUBCUTANEOUS ONCE
Refills: 0 | Status: COMPLETED | OUTPATIENT
Start: 2024-05-23 | End: 2024-05-23

## 2024-05-23 RX ORDER — BACLOFEN 100 %
1 POWDER (GRAM) MISCELLANEOUS
Refills: 0 | DISCHARGE

## 2024-05-23 RX ORDER — QUETIAPINE FUMARATE 200 MG/1
100 TABLET, FILM COATED ORAL DAILY
Refills: 0 | Status: DISCONTINUED | OUTPATIENT
Start: 2024-05-23 | End: 2024-05-28

## 2024-05-23 RX ORDER — ALBUTEROL 90 UG/1
2 AEROSOL, METERED ORAL EVERY 6 HOURS
Refills: 0 | Status: DISCONTINUED | OUTPATIENT
Start: 2024-05-23 | End: 2024-05-28

## 2024-05-23 RX ORDER — ACETAMINOPHEN 500 MG
650 TABLET ORAL EVERY 6 HOURS
Refills: 0 | Status: DISCONTINUED | OUTPATIENT
Start: 2024-05-23 | End: 2024-05-28

## 2024-05-23 RX ORDER — LANOLIN ALCOHOL/MO/W.PET/CERES
3 CREAM (GRAM) TOPICAL AT BEDTIME
Refills: 0 | Status: DISCONTINUED | OUTPATIENT
Start: 2024-05-23 | End: 2024-05-28

## 2024-05-23 RX ORDER — GABAPENTIN 400 MG/1
1 CAPSULE ORAL
Refills: 0 | DISCHARGE

## 2024-05-23 RX ORDER — METHOCARBAMOL 500 MG/1
500 TABLET, FILM COATED ORAL THREE TIMES A DAY
Refills: 0 | Status: DISCONTINUED | OUTPATIENT
Start: 2024-05-23 | End: 2024-05-28

## 2024-05-23 RX ORDER — HEPARIN SODIUM 5000 [USP'U]/ML
5000 INJECTION INTRAVENOUS; SUBCUTANEOUS EVERY 12 HOURS
Refills: 0 | Status: DISCONTINUED | OUTPATIENT
Start: 2024-05-23 | End: 2024-05-28

## 2024-05-23 RX ORDER — LANOLIN ALCOHOL/MO/W.PET/CERES
1 CREAM (GRAM) TOPICAL
Refills: 0 | DISCHARGE

## 2024-05-23 RX ORDER — ALBUTEROL 90 UG/1
2 AEROSOL, METERED ORAL
Refills: 0 | DISCHARGE

## 2024-05-23 RX ORDER — GABAPENTIN 400 MG/1
900 CAPSULE ORAL THREE TIMES A DAY
Refills: 0 | Status: DISCONTINUED | OUTPATIENT
Start: 2024-05-23 | End: 2024-05-28

## 2024-05-23 RX ORDER — QUETIAPINE FUMARATE 200 MG/1
300 TABLET, FILM COATED ORAL AT BEDTIME
Refills: 0 | Status: DISCONTINUED | OUTPATIENT
Start: 2024-05-23 | End: 2024-05-28

## 2024-05-23 RX ADMIN — QUETIAPINE FUMARATE 300 MILLIGRAM(S): 200 TABLET, FILM COATED ORAL at 19:54

## 2024-05-23 RX ADMIN — Medication 5 MILLIGRAM(S): at 19:55

## 2024-05-23 RX ADMIN — GABAPENTIN 900 MILLIGRAM(S): 400 CAPSULE ORAL at 19:55

## 2024-05-23 RX ADMIN — METHOCARBAMOL 500 MILLIGRAM(S): 500 TABLET, FILM COATED ORAL at 19:54

## 2024-05-23 RX ADMIN — BICTEGRAVIR SODIUM, EMTRICITABINE, AND TENOFOVIR ALAFENAMIDE FUMARATE 1 TABLET(S): 30; 120; 15 TABLET ORAL at 17:17

## 2024-05-23 RX ADMIN — SODIUM CHLORIDE 3 MILLILITER(S): 9 INJECTION INTRAMUSCULAR; INTRAVENOUS; SUBCUTANEOUS at 11:56

## 2024-05-23 NOTE — H&P ADULT - NSICDXPASTMEDICALHX_GEN_ALL_CORE_FT
PAST MEDICAL HISTORY:  Asthma     Chronic sinusitis     Closed fracture of multiple ribs of right side, initial encounter     Cocaine abuse     GBS (Guillain [place] syndrome)     HIV (human immunodeficiency virus infection) from birth    HIV disease     Homeless

## 2024-05-23 NOTE — ED ADULT NURSE REASSESSMENT NOTE - NS ED NURSE REASSESS COMMENT FT1
Pt care assumed from previous RNOneida. Pt resting in stretcher w/ breathing even and unlabored. Pt aware of POC to await social work and PT eval. Safety and comfort measures in place at this time.

## 2024-05-23 NOTE — ED ADULT NURSE NOTE - OBJECTIVE STATEMENT
The pt is a 35 y/o male A&OX4 presenting to the ED c/o back pain, leg pain and weakness. Pt reports that this started x2 months ago and has been getting worse. Pt states "I can't take care of myself from the pain I can't get into the shower". Pt denies SOB, CP, N/V/D. Respirations even and unlabored, no distress noted.

## 2024-05-23 NOTE — PATIENT PROFILE ADULT - FALL HARM RISK - HARM RISK INTERVENTIONS

## 2024-05-23 NOTE — ED PROVIDER NOTE - CLINICAL SUMMARY MEDICAL DECISION MAKING FREE TEXT BOX
34-year-old male with progressive weakness.  Will place social work consult and PT consult and have patient evaluated in the morning.

## 2024-05-23 NOTE — H&P ADULT - HISTORY OF PRESENT ILLNESS
34-year-old male presents with complaint of weakness.  Patient with history of HIV, GBS ( few years back)   states  he is just getting  progressively worsening.  He reports difficulty caring for himself and states in the past he used to live in a skilled nursing facility and feels that he is at that point again in his life where he needs to have more help.  Denies any fevers, injuries or increased pain. Not safe for dc to shelter, adm for further w/up; incomplete PT eval re: walk due to weakness; plan for MRI

## 2024-05-23 NOTE — PATIENT PROFILE ADULT - FUNCTIONAL ASSESSMENT - BASIC MOBILITY 6.
2-calculated by average/Not able to assess (calculate score using [facility] averaging method)

## 2024-05-23 NOTE — PHYSICAL THERAPY INITIAL EVALUATION ADULT - PERTINENT HX OF CURRENT PROBLEM, REHAB EVAL
Pt is a 34 year old male presenting with Pt is a 34 year old male presenting with weakness. Pt has a hx of GBS, and feels as though it is progressing. PMH listed below.

## 2024-05-23 NOTE — PHARMACOTHERAPY INTERVENTION NOTE - COMMENTS
Medication history complete, reviewed medication with patient and confirmed with DrFirst.  Pt states that he takes Oxycodone and Lyrica but has not filled since June of 2023.  Pt also filled suboxone from Dec 2023-March 2024.

## 2024-05-23 NOTE — ED PROVIDER NOTE - NSICDXPASTMEDICALHX_GEN_ALL_CORE_FT
PAST MEDICAL HISTORY:  Asthma     Chronic sinusitis     Closed fracture of multiple ribs of right side, initial encounter     Cocaine abuse     GBS (Guillain [place] syndrome)     HIV (human immunodeficiency virus infection) from birth    HIV disease     Homeless

## 2024-05-23 NOTE — ED ADULT NURSE REASSESSMENT NOTE - NS ED NURSE REASSESS COMMENT FT1
Pt resting in stretcher with safety maintained. Respirations even and unlabored, no distress noted. Awaiting social work at this time.

## 2024-05-23 NOTE — ED ADULT NURSE REASSESSMENT NOTE - NS ED NURSE REASSESS COMMENT FT1
As per MD Mujica pt able to have diet order placed. Confirmed twice with MD whyte/ Regular diet. order placed. pt aware of POC to await SW for placement. safety and comfort measures in place.

## 2024-05-23 NOTE — ED ADULT NURSE NOTE - CHIEF COMPLAINT
Mikal Aly is a 56 year old male presents today for   Chief Complaint   Patient presents with   • Finger Injury   Mikal Aly is a 56 year old male presenting with injury to the left fourth digit. He states he smashed it between two logs while splitting wood yesterday. Pt complains of pain to the distal end of finger, bruising and swelling are present. Pt has taken ibuprofen for pain, last dose yesterday. Pt has it michael taped and splinted upon arrival.       ALLERGIES:   Allergen Reactions   • Amoxicillin      hives     • Penicillins HIVES     eyes itich   • Codeine Nausea & Vomiting   • Sulfa Antibiotics Other (See Comments) and Nausea & Vomiting       Medications: medications verified and updated      PCP: Arlette Pierce MD  Visit Vitals  /65 (BP Location: RUE - Right upper extremity, Patient Position: Sitting, Cuff Size: Large Adult)   Pulse 68   Temp 97.3 °F (36.3 °C) (Temporal)   Resp 16   Wt 78.5 kg (173 lb 1.6 oz)   SpO2 97%   BMI 24.84 kg/m²       Patient here alone    Patient would like communication of their results via:      Cell Phone:   Telephone Information:   Mobile [phone]     Okay to leave a message containing results? Yes       The patient is a 34y Male complaining of weakness.

## 2024-05-23 NOTE — ED PROVIDER NOTE - OBJECTIVE STATEMENT
34-year-old male presents with complaint of weakness.  Patient with history of GBS and states who thinks he is just progressively worsening.  He reports difficulty caring for himself and states in the past he used to live in a skilled nursing facility and feels that he is at that point again in his life where he needs to have more help.  Denies any fevers, injuries or increased pain.

## 2024-05-23 NOTE — PHYSICAL THERAPY INITIAL EVALUATION ADULT - ADDITIONAL COMMENTS
Pt reports living in a house/shelter with 3 exterior steps to enter. Once inside, pt resides on the first level. Pt ambulates with a cane at baseline. Pt reports no recent hx of PT.

## 2024-05-23 NOTE — ED ADULT NURSE NOTE - CHIEF COMPLAINT QUOTE
Pt BIBEMS c/o numbness and weakness in both legs starting x3 days ago. Pt reports he was diagnosed with Guillain-barre in 2018 and experiences flare ups where he becomes weak. Pt states that this weakness has "lasted longer than usual and he can't get up to walk to the bathroom." Pt c/o lower back and b/l leg pain. No s/s of distress in triage. PMHx HIV

## 2024-05-23 NOTE — ED PROVIDER NOTE - PROGRESS NOTE DETAILS
ED Attending Dr. Weinberg, d/w [facility] social work - not able to place in SNF or rehab from ED today.  plan admit.

## 2024-05-23 NOTE — H&P ADULT - NSHPPHYSICALEXAM_GEN_ALL_CORE
Vital Signs Last 24 Hrs  T(C): 36.5 (23 May 2024 13:43), Max: 36.8 (23 May 2024 07:03)  T(F): 97.7 (23 May 2024 13:43), Max: 98.2 (23 May 2024 07:03)  HR: 64 (23 May 2024 13:43) (64 - 88)  BP: 134/79 (23 May 2024 13:43) (118/60 - 139/82)  BP(mean): 83 (23 May 2024 13:16) (83 - 93)  RR: 18 (23 May 2024 13:43) (17 - 18)  SpO2: 100% (23 May 2024 13:43) (98% - 100%)    Parameters below as of 23 May 2024 13:43  Patient On (Oxygen Delivery Method): room air    PHYSICAL EXAM:      Constitutional: NAD  Eyes: perrl, no conjunctival changes  ENMT: no exudates, moist oral muc, uvula midline  Neck: no JVD, no LAD  Back: no cva tenderness  Respiratory: CTA, no exp wheezes  Cardiovascular: S1S2 reg, no murmur gallop or rub  Gastrointestinal: abd soft, NT/ND + BS  Genitourinary: voiding  Extremities: FROM, no joint effusions, no edema, no clubbing , no cyanosis  Vascular: pedal pulses + bilateral, warm extremities  Neurological: non focal, mot str 4/5/ all extr  Skin: no rashes  Lymph Nodes: no LAD

## 2024-05-23 NOTE — ED ADULT NURSE NOTE - NSFALLRISKINTERV_ED_ALL_ED

## 2024-05-23 NOTE — ED PROVIDER NOTE - PHYSICAL EXAMINATION
***GEN - NAD; well appearing; A+O x3 ***HEAD - NC/AT ***EYES/NOSE - PERRL, EOMI, mucous membranes moist, no discharge ***THROAT: Oral cavity and pharynx normal. No inflammation, swelling, exudate, or lesions.  ***NECK: Neck supple, non-tender   ***PULMONARY - CTA b/l, symmetric breath sounds. ***CARDIAC -s1s2, RRR, no M,G,R  ***ABDOMEN - +BS, ND, NT, soft, no guarding, no rebound, no masses   ***BACK - no CVA tenderness, Normal  spine ***EXTREMITIES - symmetric pulses, 2+ dp, capillary refill < 2 seconds ***SKIN - no rash or bruising   ***NEUROLOGIC - alert, CN 2-12 intact

## 2024-05-23 NOTE — PHYSICAL THERAPY INITIAL EVALUATION ADULT - RANGE OF MOTION EXAMINATION, REHAB EVAL
bilateral upper extremity ROM was WFL (within functional limits)/bilateral lower extremity ROM was WFL (within functional limits) bilateral upper extremity ROM was WFL (within functional limits)

## 2024-05-23 NOTE — H&P ADULT - ASSESSMENT
* Weakness FTT  check MRI LS spine  PT eval rehab if workup negative  check B12, folate    * HIV resume home meds

## 2024-05-24 DIAGNOSIS — R62.7 ADULT FAILURE TO THRIVE: ICD-10-CM

## 2024-05-24 DIAGNOSIS — R53.1 WEAKNESS: ICD-10-CM

## 2024-05-24 DIAGNOSIS — B20 HUMAN IMMUNODEFICIENCY VIRUS [HIV] DISEASE: ICD-10-CM

## 2024-05-24 LAB
ADD ON TEST-SPECIMEN IN LAB: SIGNIFICANT CHANGE UP
FOLATE SERPL-MCNC: 16.8 NG/ML — SIGNIFICANT CHANGE UP
MAGNESIUM SERPL-MCNC: 1.2 MG/DL — LOW (ref 1.6–2.6)
VIT B12 SERPL-MCNC: 555 PG/ML — SIGNIFICANT CHANGE UP (ref 232–1245)

## 2024-05-24 PROCEDURE — 99221 1ST HOSP IP/OBS SF/LOW 40: CPT

## 2024-05-24 PROCEDURE — 99233 SBSQ HOSP IP/OBS HIGH 50: CPT

## 2024-05-24 RX ORDER — POTASSIUM CHLORIDE 20 MEQ
20 PACKET (EA) ORAL
Refills: 0 | Status: DISCONTINUED | OUTPATIENT
Start: 2024-05-24 | End: 2024-05-24

## 2024-05-24 RX ORDER — MAGNESIUM OXIDE 400 MG ORAL TABLET 241.3 MG
400 TABLET ORAL
Refills: 0 | Status: COMPLETED | OUTPATIENT
Start: 2024-05-24 | End: 2024-05-26

## 2024-05-24 RX ORDER — POTASSIUM CHLORIDE 20 MEQ
20 PACKET (EA) ORAL
Refills: 0 | Status: DISCONTINUED | OUTPATIENT
Start: 2024-05-24 | End: 2024-05-25

## 2024-05-24 RX ADMIN — Medication 5 MILLIGRAM(S): at 13:42

## 2024-05-24 RX ADMIN — Medication 20 MILLIEQUIVALENT(S): at 22:22

## 2024-05-24 RX ADMIN — METHOCARBAMOL 500 MILLIGRAM(S): 500 TABLET, FILM COATED ORAL at 13:42

## 2024-05-24 RX ADMIN — BICTEGRAVIR SODIUM, EMTRICITABINE, AND TENOFOVIR ALAFENAMIDE FUMARATE 1 TABLET(S): 30; 120; 15 TABLET ORAL at 11:36

## 2024-05-24 RX ADMIN — Medication 5 MILLIGRAM(S): at 06:21

## 2024-05-24 RX ADMIN — Medication 20 MILLIEQUIVALENT(S): at 09:38

## 2024-05-24 RX ADMIN — QUETIAPINE FUMARATE 100 MILLIGRAM(S): 200 TABLET, FILM COATED ORAL at 12:01

## 2024-05-24 RX ADMIN — MAGNESIUM OXIDE 400 MG ORAL TABLET 400 MILLIGRAM(S): 241.3 TABLET ORAL at 22:37

## 2024-05-24 RX ADMIN — Medication 5 MILLIGRAM(S): at 22:37

## 2024-05-24 RX ADMIN — METHOCARBAMOL 500 MILLIGRAM(S): 500 TABLET, FILM COATED ORAL at 22:22

## 2024-05-24 RX ADMIN — GABAPENTIN 900 MILLIGRAM(S): 400 CAPSULE ORAL at 22:22

## 2024-05-24 RX ADMIN — QUETIAPINE FUMARATE 300 MILLIGRAM(S): 200 TABLET, FILM COATED ORAL at 22:22

## 2024-05-24 RX ADMIN — GABAPENTIN 900 MILLIGRAM(S): 400 CAPSULE ORAL at 13:41

## 2024-05-24 RX ADMIN — METHOCARBAMOL 500 MILLIGRAM(S): 500 TABLET, FILM COATED ORAL at 06:21

## 2024-05-24 RX ADMIN — GABAPENTIN 900 MILLIGRAM(S): 400 CAPSULE ORAL at 06:21

## 2024-05-24 NOTE — CONSULT NOTE ADULT - SUBJECTIVE AND OBJECTIVE BOX
34yM was admitted on 05-23    Patient is a 34y old  Male who presents with a chief complaint of Weakness     (24 May 2024 09:23)    HPI:  34-year-old male presents with complaint of weakness.  Patient with history of HIV, GBS ( few years back)   states  he is just getting  progressively worsening.  He reports difficulty caring for himself and states in the past he used to live in a skilled nursing facility and feels that he is at that point again in his life where he needs to have more help.  Denies any fevers, injuries or increased pain. Not safe for dc to shelter, adm for further w/up; incomplete PT eval re: walk due to weakness; plan for MRI (23 May 2024 15:07)    Imaging performed:  Head CT 5.10.2024  IMPRESSION:  No evidence of a mass or current evidence of acute ischemia.  Mild volume loss with mild biparietal periventricular white matter hyperintensity. Findings are stable from 2020. Findings may be related to prior infection and/or demyelination. Correlate with patient's clinical history,    MRI Lumbar spine 5.10.2024  DISC LEVELS:  L1-2: No disc bulge or protrusion. Canal and neural foramina are patent.  L2-3: No disc bulge or protrusion. Canal and neural foramina are patent.  L3-4: No disc bulge or protrusion. Canal and neural foramina are patent.  L4-5: Mild bulging of the disc annulus without significant canal or neural foramina narrowing  L5-S1: Small left paracentral/foraminal disc protrusion which reaches and mildly displaces the left S1 nerve root    CANAL CONTENTS:  No compression of the distal cord or cauda equina.  Tip of the conus is at the L1-2 level.  No epidural collection    OTHER:  The sacro-illiac joints appear intact.    IMPRESSION:  Small left paracentral/foraminal disc protrusion which reaches and mildly displaces the left S1 nerve root.  Correlate with symptoms.    REVIEW OF SYSTEMS  Constitutional - No fever, No weight loss, No fatigue  HEENT - No eye pain, No visual disturbances, No difficulty hearing, No tinnitus, No vertigo, No neck pain  Respiratory - No cough, No wheezing, No shortness of breath  Cardiovascular - No chest pain, No palpitations  Gastrointestinal - No abdominal pain, No nausea, No vomiting, No diarrhea, No constipation  Genitourinary - No dysuria, No frequency, No hematuria, No incontinence  Neurological - No headaches, No memory loss, No loss of strength, No numbness, No tremors  Skin - No itching, No rashes, No lesions   Endocrine - No temperature intolerance  Musculoskeletal - No joint pain, No joint swelling, No muscle pain  Psychiatric - No depression, No anxiety    VITALS  T(C): 36.7 (05-24-24 @ 07:30), Max: 36.9 (05-23-24 @ 15:35)  HR: 56 (05-24-24 @ 07:30) (56 - 84)  BP: 117/66 (05-24-24 @ 07:30) (117/66 - 136/68)  RR: 17 (05-24-24 @ 07:30) (17 - 18)  SpO2: 100% (05-24-24 @ 07:30) (98% - 100%)  Wt(kg): --    PAST MEDICAL & SURGICAL HISTORY  HIV (human immunodeficiency virus infection)    Guillain [place] syndrome    Guillain-[place]    Asthma    HIV (human immunodeficiency virus infection)    CVA (cerebral vascular accident)    Closed fracture of multiple ribs of right side, initial encounter    Cocaine abuse    Chronic sinusitis    Homeless    HIV disease    HIV (human immunodeficiency virus infection)    GBS (Guillain [place] syndrome)    Guillain-[place] syndrome    HIV positive    Stroke    Guillain-[place]    GBS (Guillain [place] syndrome)    Prophylactic measure    HIV disease    History of orthopedic surgery    No significant past surgical history    No significant past surgical history        SOCIAL HISTORY - as per documentation/history  Smoking - None  EtOH - None  Drugs - None    FUNCTIONAL HISTORY  Lives   Independent    CURRENT FUNCTIONAL STATUS      FAMILY HISTORY   No pertinent family history in first degree relatives    Family history unknown    FH: HIV infection (Mother)        RECENT LABS - Reviewed  CBC Full  -  ( 23 May 2024 01:35 )  WBC Count : 6.17 K/uL  RBC Count : 4.33 M/uL  Hemoglobin : 12.9 g/dL  Hematocrit : 39.1 %  Platelet Count - Automated : 302 K/uL  Mean Cell Volume : 90.3 fl  Mean Cell Hemoglobin : 29.8 pg  Mean Cell Hemoglobin Concentration : 33.0 gm/dL  Auto Neutrophil # : 3.13 K/uL  Auto Lymphocyte # : 2.48 K/uL  Auto Monocyte # : 0.49 K/uL  Auto Eosinophil # : 0.04 K/uL  Auto Basophil # : 0.02 K/uL  Auto Neutrophil % : 50.8 %  Auto Lymphocyte % : 40.2 %  Auto Monocyte % : 7.9 %  Auto Eosinophil % : 0.6 %  Auto Basophil % : 0.3 %    05-23    143  |  110<H>  |  21  ----------------------------<  120<H>  3.4<L>   |  27  |  0.96    Ca    9.8      23 May 2024 01:35    TPro  8.1  /  Alb  3.9  /  TBili  0.3  /  DBili  x   /  AST  18  /  ALT  23  /  AlkPhos  92  05-23    Urinalysis Basic - ( 23 May 2024 01:35 )    Color: x / Appearance: x / SG: x / pH: x  Gluc: 120 mg/dL / Ketone: x  / Bili: x / Urobili: x   Blood: x / Protein: x / Nitrite: x   Leuk Esterase: x / RBC: x / WBC x   Sq Epi: x / Non Sq Epi: x / Bacteria: x        ALLERGIES  Toradol (Swelling)  Mushrooms (Unknown)  Toradol (Anaphylaxis; Swelling)  Ceclor (Unknown)      MEDICATIONS   acetaminophen     Tablet .. 650 milliGRAM(s) Oral every 6 hours PRN  albuterol    90 MICROgram(s) HFA Inhaler 2 Puff(s) Inhalation every 6 hours PRN  aluminum hydroxide/magnesium hydroxide/simethicone Suspension 30 milliLiter(s) Oral every 4 hours PRN  baclofen 5 milliGRAM(s) Oral every 8 hours  bictegravir 50 mG/emtricitabine 200 mG/tenofovir alafenamide 25 mG (BIKTARVY) 1 Tablet(s) Oral daily  gabapentin 900 milliGRAM(s) Oral three times a day  heparin   Injectable 5000 Unit(s) SubCutaneous every 12 hours  melatonin 3 milliGRAM(s) Oral at bedtime PRN  methocarbamol 500 milliGRAM(s) Oral three times a day  ondansetron Injectable 4 milliGRAM(s) IV Push every 6 hours PRN  potassium chloride   Powder 20 milliEquivalent(s) Oral two times a day  QUEtiapine 100 milliGRAM(s) Oral daily  QUEtiapine 300 milliGRAM(s) Oral at bedtime      ----------------------------------------------------------------------------------------  PHYSICAL EXAM  Constitutional - NAD, Comfortable  HEENT - NCAT, EOMI  Neck - Supple, No limited ROM  Chest - Breathing comfortably, No wheezing  Cardiovascular - S1S2   Abdomen - Soft   Extremities - No C/C/E, No calf tenderness   Neurologic Exam -                    Cognitive - AAO to self, place, date, year, situation     Communication - Fluent, No dysarthria     Cranial Nerves - CN 2-12 intact     Motor - No focal deficits                    LEFT    UE - ShAB 5/5, EF 5/5, EE 5/5, WE 5/5,  5/5                    RIGHT UE - ShAB 5/5, EF 5/5, EE 5/5, WE 5/5,  5/5                    LEFT    LE - HF 5/5, KE 5/5, DF 5/5, PF 5/5                    RIGHT LE - HF 5/5, KE 5/5, DF 5/5, PF 5/5        Sensory - Intact to LT     Reflexes - DTR Intact, No primitive reflexive     Coordination - FTN intact     OculoVestibular - No saccades, No nystagmus, VOR         Balance - WNL Static  Psychiatric - Mood stable, Affect WNL  ----------------------------------------------------------------------------------------   34yM was admitted on 05-23    Patient is a 34y old  Male who presents with a chief complaint of Weakness     (24 May 2024 09:23)    HPI:  34-year-old male presents with complaint of weakness.  Patient with history of HIV, GBS ( few years back)   states  he is just getting  progressively worsening.  He reports difficulty caring for himself and states in the past he used to live in a skilled nursing facility and feels that he is at that point again in his life where he needs to have more help.  Denies any fevers, injuries or increased pain. Not safe for dc to shelter, adm for further w/up; incomplete PT eval re: walk due to weakness; plan for MRI (23 May 2024 15:07)    Imaging performed:  Head CT 5.10.2024  IMPRESSION:  No evidence of a mass or current evidence of acute ischemia.  Mild volume loss with mild biparietal periventricular white matter hyperintensity. Findings are stable from 2020. Findings may be related to prior infection and/or demyelination. Correlate with patient's clinical history,    MRI Lumbar spine 5.10.2024  DISC LEVELS:  L1-2: No disc bulge or protrusion. Canal and neural foramina are patent.  L2-3: No disc bulge or protrusion. Canal and neural foramina are patent.  L3-4: No disc bulge or protrusion. Canal and neural foramina are patent.  L4-5: Mild bulging of the disc annulus without significant canal or neural foramina narrowing  L5-S1: Small left paracentral/foraminal disc protrusion which reaches and mildly displaces the left S1 nerve root    CANAL CONTENTS:  No compression of the distal cord or cauda equina.  Tip of the conus is at the L1-2 level.  No epidural collection    OTHER:  The sacro-illiac joints appear intact.    IMPRESSION:  Small left paracentral/foraminal disc protrusion which reaches and mildly displaces the left S1 nerve root.  Correlate with symptoms.    REVIEW OF SYSTEMS  Constitutional - No fever, No weight loss, No fatigue  HEENT - No eye pain, No visual disturbances, No difficulty hearing, No tinnitus, No vertigo, No neck pain  Respiratory - No cough, No wheezing, No shortness of breath  Cardiovascular - No chest pain, No palpitations  Gastrointestinal - No abdominal pain, No nausea, No vomiting, No diarrhea, No constipation  Genitourinary - No dysuria, No frequency, No hematuria, No incontinence  Neurological - No headaches, No memory loss, No loss of strength, No numbness, No tremors  Skin - No itching, No rashes, No lesions   Endocrine - No temperature intolerance  Musculoskeletal - No joint pain, No joint swelling, No muscle pain  Psychiatric - No depression, No anxiety    VITALS  T(C): 36.7 (05-24-24 @ 07:30), Max: 36.9 (05-23-24 @ 15:35)  HR: 56 (05-24-24 @ 07:30) (56 - 84)  BP: 117/66 (05-24-24 @ 07:30) (117/66 - 136/68)  RR: 17 (05-24-24 @ 07:30) (17 - 18)  SpO2: 100% (05-24-24 @ 07:30) (98% - 100%)  Wt(kg): --    PAST MEDICAL & SURGICAL HISTORY  HIV (human immunodeficiency virus infection)    Guillain BarrÃ© syndrome    Guillain-[place]    Asthma    HIV (human immunodeficiency virus infection)    CVA (cerebral vascular accident)    Closed fracture of multiple ribs of right side, initial encounter    Cocaine abuse    Chronic sinusitis    Homeless    HIV disease    HIV (human immunodeficiency virus infection)    GBS (Guillain [place] syndrome)    Guillain-[place] syndrome    HIV positive    Stroke    Guillain-[place]    GBS (Guillain [place] syndrome)    Prophylactic measure    HIV disease    History of orthopedic surgery    No significant past surgical history    No significant past surgical history        SOCIAL HISTORY - as per documentation/history  Smoking - None  EtOH - None  Drugs - None    FUNCTIONAL HISTORY  Lived in a pvt home 4 chris B[facility] without AD    CURRENT FUNCTIONAL STATUS  stands with DS    FAMILY HISTORY   No pertinent family history in first degree relatives    Family history unknown    FH: HIV infection (Mother)      RECENT LABS - Reviewed  CBC Full  -  ( 23 May 2024 01:35 )  WBC Count : 6.17 K/uL  RBC Count : 4.33 M/uL  Hemoglobin : 12.9 g/dL  Hematocrit : 39.1 %  Platelet Count - Automated : 302 K/uL  Mean Cell Volume : 90.3 fl  Mean Cell Hemoglobin : 29.8 pg  Mean Cell Hemoglobin Concentration : 33.0 gm/dL  Auto Neutrophil # : 3.13 K/uL  Auto Lymphocyte # : 2.48 K/uL  Auto Monocyte # : 0.49 K/uL  Auto Eosinophil # : 0.04 K/uL  Auto Basophil # : 0.02 K/uL  Auto Neutrophil % : 50.8 %  Auto Lymphocyte % : 40.2 %  Auto Monocyte % : 7.9 %  Auto Eosinophil % : 0.6 %  Auto Basophil % : 0.3 %    05-23    143  |  110<H>  |  21  ----------------------------<  120<H>  3.4<L>   |  27  |  0.96    Ca    9.8      23 May 2024 01:35    TPro  8.1  /  Alb  3.9  /  TBili  0.3  /  DBili  x   /  AST  18  /  ALT  23  /  AlkPhos  92  05-23    Urinalysis Basic - ( 23 May 2024 01:35 )    Color: x / Appearance: x / SG: x / pH: x  Gluc: 120 mg/dL / Ketone: x  / Bili: x / Urobili: x   Blood: x / Protein: x / Nitrite: x   Leuk Esterase: x / RBC: x / WBC x   Sq Epi: x / Non Sq Epi: x / Bacteria: x        ALLERGIES  Toradol (Swelling)  Mushrooms (Unknown)  Toradol (Anaphylaxis; Swelling)  Ceclor (Unknown)      MEDICATIONS   acetaminophen     Tablet .. 650 milliGRAM(s) Oral every 6 hours PRN  albuterol    90 MICROgram(s) HFA Inhaler 2 Puff(s) Inhalation every 6 hours PRN  aluminum hydroxide/magnesium hydroxide/simethicone Suspension 30 milliLiter(s) Oral every 4 hours PRN  baclofen 5 milliGRAM(s) Oral every 8 hours  bictegravir 50 mG/emtricitabine 200 mG/tenofovir alafenamide 25 mG (BIKTARVY) 1 Tablet(s) Oral daily  gabapentin 900 milliGRAM(s) Oral three times a day  heparin   Injectable 5000 Unit(s) SubCutaneous every 12 hours  melatonin 3 milliGRAM(s) Oral at bedtime PRN  methocarbamol 500 milliGRAM(s) Oral three times a day  ondansetron Injectable 4 milliGRAM(s) IV Push every 6 hours PRN  potassium chloride   Powder 20 milliEquivalent(s) Oral two times a day  QUEtiapine 100 milliGRAM(s) Oral daily  QUEtiapine 300 milliGRAM(s) Oral at bedtime      ----------------------------------------------------------------------------------------  PHYSICAL EXAM  Constitutional - NAD, Comfortable  HEENT - NCAT, EOMI  Neck - Supple, No limited ROM  Chest - Breathing comfortably, No wheezing  Cardiovascular - S1S2   Abdomen - Soft   Extremities - No C/C/E, No calf tenderness   Neurologic Exam -                    Cognitive - AAO to self, place, date, year, situation     Communication - Fluent, No dysarthria     Cranial Nerves - CN 2-12 intact     Motor - No focal deficits                    LEFT    UE - ShAB 5/5, EF 5/5, EE 5/5, WE 5/5,  5/5                    RIGHT UE - ShAB 5/5, EF 5/5, EE 5/5, WE 5/5,  5/5                    LEFT    LE - HF 4/5, KE 4/5, DF 4/5, PF 4/5                    RIGHT LE - HF 4/5, KE 4/5, DF 4/5, PF 4/5        Sensory - Intact to LT     Reflexes - DTR Intact, No primitive reflexive     Coordination - FTN intact     OculoVestibular - No saccades, No nystagmus, VOR         Balance - WNL Static  Psychiatric - Mood stable, Affect WNL  ----------------------------------------------------------------------------------------

## 2024-05-24 NOTE — PROGRESS NOTE ADULT - SUBJECTIVE AND OBJECTIVE BOX
< from: CT Abdomen and Pelvis w/wo IV Cont (05.14.24 @ 03:35) >  IMPRESSION:  Tiny focus of arterial enhancement in the region of the rectum, not   definitely seen on prior study of 10/4/2023, may reflecta focus of   active bleed.    < end of copied text >  < from: MR Lumbar Spine No Cont (05.10.24 @ 07:59) >  IMPRESSION:  Small left paracentral/foraminal disc protrusion which reaches and mildly   displaces the left S1 nerve root.  Correlate with symptoms.    --- End of Report ---    < end of copied text >  < from: MR Head No Cont (05.10.24 @ 07:59) >  IMPRESSION:  No evidence of a mass or current evidence of acute ischemia.  Mild  volume loss with mild biparietal periventricular white matter   hyperintensity. Findings are stable from 2020. Findings may be related to   prior infection and/or demyelination. Correlate with patient's clinical   history,    --- End of Report ---    < end of copied text >   HOSPITALIST ATTENDING PROGRESS NOTE    Chart and meds reviewed.  Patient seen and examined.    CC/ HPI Patient is a 34y old  Male who presents with a chief complaint of FTT not safe for shelter (24 May 2024 12:11)      Subjective: Seen at bedside with Dr. Harper. Complains of difficulty walking.     All other systems reviewed and found to be negative with the exception of what has been described above.    MEDICATIONS:  MEDICATIONS  (STANDING):  baclofen 5 milliGRAM(s) Oral every 8 hours  bictegravir 50 mG/emtricitabine 200 mG/tenofovir alafenamide 25 mG (BIKTARVY) 1 Tablet(s) Oral daily  gabapentin 900 milliGRAM(s) Oral three times a day  heparin   Injectable 5000 Unit(s) SubCutaneous every 12 hours  methocarbamol 500 milliGRAM(s) Oral three times a day  potassium chloride   Powder 20 milliEquivalent(s) Oral two times a day  QUEtiapine 100 milliGRAM(s) Oral daily  QUEtiapine 300 milliGRAM(s) Oral at bedtime      Vital Signs Last 24 Hrs  T(C): 37.2 (24 May 2024 15:34), Max: 37.2 (24 May 2024 15:34)  T(F): 99 (24 May 2024 15:34), Max: 99 (24 May 2024 15:34)  HR: 82 (24 May 2024 15:34) (56 - 82)  BP: 114/62 (24 May 2024 15:34) (114/62 - 126/80)  BP(mean): 93 (23 May 2024 20:54) (93 - 93)  RR: 18 (24 May 2024 15:34) (17 - 18)  SpO2: 99% (24 May 2024 15:34) (99% - 100%)    Parameters below as of 24 May 2024 15:34  Patient On (Oxygen Delivery Method): room air        I&O's Summary      CAPILLARY BLOOD GLUCOSE          PHYSICAL EXAM:    Constitutional: NAD, awake and alert, well-developed  HEENT:  EOMI, Normal Hearing, MMM  Neck: Soft and supple, No LAD, No JVD  Respiratory: Breath sounds are clear bilaterally, No wheezing, rales or rhonchi  Cardiovascular: S1 and S2, regular rate and rhythm, no Murmurs, gallops or rubs  Gastrointestinal: Bowel Sounds present, soft, nontender, nondistended, no guarding, no rebound  Extremities: No peripheral edema  Vascular: 2+ peripheral pulses  Neurological: A/O x 3, no focal deficits  Musculoskeletal: 5/5 strength b/l upper extremities and lower extremities with 4/5 b/l  Skin: No rashes        LABS: All Labs Reviewed:                        12.9   6.17  )-----------( 302      ( 23 May 2024 01:35 )             39.1     05-23    143  |  110<H>  |  21  ----------------------------<  120<H>  3.4<L>   |  27  |  0.96    Ca    9.8      23 May 2024 01:35  Mg     1.2     05-24    TPro  8.1  /  Alb  3.9  /  TBili  0.3  /  DBili  x   /  AST  18  /  ALT  23  /  AlkPhos  92  05-23          Blood Culture:     RADIOLOGY/EKG:    DVT PPX:    ADVANCED DIRECTIVE:    DISPOSITION: DC planning to Shelter as patient refusing [facility] placement and not a candidate for Acute rehab    < from: CT Abdomen and Pelvis w/wo IV Cont (05.14.24 @ 03:35) >  IMPRESSION:  Tiny focus of arterial enhancement in the region of the rectum, not   definitely seen on prior study of 10/4/2023, may reflecta focus of   active bleed.    < end of copied text >  < from: MR Lumbar Spine No Cont (05.10.24 @ 07:59) >  IMPRESSION:  Small left paracentral/foraminal disc protrusion which reaches and mildly   displaces the left S1 nerve root.  Correlate with symptoms.    --- End of Report ---    < end of copied text >  < from: MR Head No Cont (05.10.24 @ 07:59) >  IMPRESSION:  No evidence of a mass or current evidence of acute ischemia.  Mild  volume loss with mild biparietal periventricular white matter   hyperintensity. Findings are stable from 2020. Findings may be related to   prior infection and/or demyelination. Correlate with patient's clinical   history,    --- End of Report ---    < end of copied text >    < from: MR Lumbar Spine No Cont (05.10.24 @ 07:59) >  IMPRESSION:  Small left paracentral/foraminal disc protrusion which reaches and mildly   displaces the left S1 nerve root.  Correlate with symptoms.    --- End of Report ---      < end of copied text >

## 2024-05-24 NOTE — DIETITIAN INITIAL EVALUATION ADULT - MALNUTRITION
Patient meets criteria for moderate protein-calorie malnutrition in context of acute on chronic disease  Patient meets criteria for severe protein-calorie malnutrition in context of acute on chronic disease

## 2024-05-24 NOTE — DIETITIAN INITIAL EVALUATION ADULT - HEIGHT FOR BMI (CENTIMETERS)
Medical Necessity Information: It is in your best interest to select a reason for this procedure from the list below. All of these items fulfill various CMS LCD requirements except the new and changing color options. Render Post-Care Instructions In Note?: no Post-Care Instructions: I reviewed with the patient in detail post-care instructions. Patient is to wear sunprotection, and avoid picking at any of the treated lesions. Pt may apply Vaseline to crusted or scabbing areas. Medical Necessity Clause: This procedure was medically necessary because the lesions that were treated were: Consent: The patient's consent was obtained including but not limited to risks of crusting, scabbing, blistering, scarring, darker or lighter pigmentary change, recurrence, incomplete removal and infection. Detail Level: Detailed Duration Of Freeze Thaw-Cycle (Seconds): 2 182.88

## 2024-05-24 NOTE — DIETITIAN INITIAL EVALUATION ADULT - NAME AND PHONE
Oneida Reynaga RDN, CDN, [facility] [place]      [phone]   [email or web address]

## 2024-05-24 NOTE — DIETITIAN INITIAL EVALUATION ADULT - SIGNS/SYMPTOMS
muscle wasting, fat wasting, PO intake estimated < 75% ENN > one month.  aeb muscle wasting, fat wasting, PO intake estimated < 75% ENN > one month.

## 2024-05-24 NOTE — DIETITIAN INITIAL EVALUATION ADULT - NSICDXPASTMEDICALHX_GEN_ALL_CORE_FT
PAST MEDICAL HISTORY:  Asthma     Chronic sinusitis     Closed fracture of multiple ribs of right side, initial encounter     Cocaine abuse     GBS (Guillain [place] syndrome)     HIV (human immunodeficiency virus infection) from birth    HIV disease     Homeless

## 2024-05-24 NOTE — DIETITIAN INITIAL EVALUATION ADULT - PERTINENT LABORATORY DATA
05-23    143  |  110<H>  |  21  ----------------------------<  120<H>  3.4<L>   |  27  |  0.96    Ca    9.8      23 May 2024 01:35    TPro  8.1  /  Alb  3.9  /  TBili  0.3  /  DBili  x   /  AST  18  /  ALT  23  /  AlkPhos  92  05-23  A1C with Estimated Average Glucose Result: 4.4 % (05-08-24 @ 11:35)  A1C with Estimated Average Glucose Result: 4.5 % (09-22-23 @ 06:27)

## 2024-05-24 NOTE — DIETITIAN INITIAL EVALUATION ADULT - ADD RECOMMEND
Maintain regular diet  Add MVI w minerals  Add ensure plus high protein TID to optimize PO intake (provides 350 kcal, 20g protein/ shake)   Record PO intake in EMR after each meal (nursing.)   Consider adding thiamine 100 mg daily 2/2 poor PO intake/ malnutrition  Monitor bowel movements, if no BM for >3 days, consider implementing bowel regimen.  Monitor PO intake, tolerance, labs and weight.

## 2024-05-24 NOTE — CONSULT NOTE ADULT - ASSESSMENT
ASSESSMENT/PLAN  34yMale with functional deficits after  Pain - Tylenol  DVT PPX - SCDs  Rehab -    Recommend ACUTE inpatient rehabilitation for the functional deficits consisting of 3 hours of therapy/day & 24 hour RN/daily PMR physician for comorbid medical management. Patient will be able to tolerate 3 hours a day.   Recommend [facility], patient DOES NOT meet acute inpatient rehabilitation criteria. Patient needs a more prolonged stay to achieve transition to community.    Expect patient to achieve functional goals for DC HOME with OUTPATIENT   Expect patient to achieve functional goals for DC HOME with HOME CARE   Follow up with CONCUSSION PROGRAM - Call [phone] for an appointment    Will continue to follow. Functional progress will determine ongoing rehab dispo recommendations, which may change.    Continue bedside therapy as well as OOB throughout the day with mobilization by staff to maintain cardiopulmonary function and prevention of secondary complications related to debility.     Discussed with rehab team.  ASSESSMENT/PLAN  34yMale with weakness of the legs since GBS from 2007  Pain - Tylenol  DVT PPX - SCDs  Rehab -    Recommend [facility], patient DOES NOT meet acute inpatient rehabilitation criteria. Patient needs a more prolonged stay to achieve transition to community.   Patient wishes to return to shelter  Will continue to follow. Functional progress will determine ongoing rehab dispo recommendations, which may change.    Continue bedside therapy as well as OOB throughout the day with mobilization by staff to maintain cardiopulmonary function and prevention of secondary complications related to debility.

## 2024-05-24 NOTE — DIETITIAN INITIAL EVALUATION ADULT - PERTINENT MEDS FT
MEDICATIONS  (STANDING):  baclofen 5 milliGRAM(s) Oral every 8 hours  bictegravir 50 mG/emtricitabine 200 mG/tenofovir alafenamide 25 mG (BIKTARVY) 1 Tablet(s) Oral daily  gabapentin 900 milliGRAM(s) Oral three times a day  heparin   Injectable 5000 Unit(s) SubCutaneous every 12 hours  methocarbamol 500 milliGRAM(s) Oral three times a day  potassium chloride   Powder 20 milliEquivalent(s) Oral two times a day  QUEtiapine 300 milliGRAM(s) Oral at bedtime  QUEtiapine 100 milliGRAM(s) Oral daily    MEDICATIONS  (PRN):  acetaminophen     Tablet .. 650 milliGRAM(s) Oral every 6 hours PRN Mild Pain (1 - 3)  albuterol    90 MICROgram(s) HFA Inhaler 2 Puff(s) Inhalation every 6 hours PRN for shortness of breath and/or wheezing  aluminum hydroxide/magnesium hydroxide/simethicone Suspension 30 milliLiter(s) Oral every 4 hours PRN Dyspepsia  melatonin 3 milliGRAM(s) Oral at bedtime PRN Insomnia  ondansetron Injectable 4 milliGRAM(s) IV Push every 6 hours PRN Nausea and/or Vomiting

## 2024-05-24 NOTE — DIETITIAN INITIAL EVALUATION ADULT - OTHER INFO
34-year-old male presents with complaint of weakness.  Patient with history of HIV, GBS ( few years back)   states  he is just getting  progressively worsening.  He reports difficulty caring for himself and states in the past he used to live in a skilled nursing facility and feels that he is at that point again in his life where he needs to have more help.  Denies any fevers, injuries or increased pain. Not safe for dc to shelter, adm for further w/up; incomplete PT eval re: walk due to weakness; plan for MRI    Admit dx weakness  Pt has hx of GBS, HIV, was in a SNF.  Now sometimes homeless  Pt requesting double portions, allowing  RD bedscale wt is 87 kg   191#  NFPE reveals muscle wasting, fat wasting, some moderate  Confirm Goals of Care regarding nutrition support. Will provide nutrition/ hydration within goals of care.   Pt may be [facility] candidate.   Recommendations to follow in Plan/Intervention

## 2024-05-25 LAB
ANION GAP SERPL CALC-SCNC: 3 MMOL/L — LOW (ref 5–17)
BUN SERPL-MCNC: 7 MG/DL — SIGNIFICANT CHANGE UP (ref 7–23)
CALCIUM SERPL-MCNC: 9 MG/DL — SIGNIFICANT CHANGE UP (ref 8.5–10.1)
CHLORIDE SERPL-SCNC: 112 MMOL/L — HIGH (ref 96–108)
CO2 SERPL-SCNC: 26 MMOL/L — SIGNIFICANT CHANGE UP (ref 22–31)
CREAT SERPL-MCNC: 0.88 MG/DL — SIGNIFICANT CHANGE UP (ref 0.5–1.3)
EGFR: 115 ML/MIN/1.73M2 — SIGNIFICANT CHANGE UP
GLUCOSE SERPL-MCNC: 97 MG/DL — SIGNIFICANT CHANGE UP (ref 70–99)
MAGNESIUM SERPL-MCNC: 1.4 MG/DL — LOW (ref 1.6–2.6)
POTASSIUM SERPL-MCNC: 3.6 MMOL/L — SIGNIFICANT CHANGE UP (ref 3.5–5.3)
POTASSIUM SERPL-SCNC: 3.6 MMOL/L — SIGNIFICANT CHANGE UP (ref 3.5–5.3)
SODIUM SERPL-SCNC: 141 MMOL/L — SIGNIFICANT CHANGE UP (ref 135–145)

## 2024-05-25 PROCEDURE — 99232 SBSQ HOSP IP/OBS MODERATE 35: CPT

## 2024-05-25 RX ORDER — MAGNESIUM SULFATE 500 MG/ML
2 VIAL (ML) INJECTION ONCE
Refills: 0 | Status: DISCONTINUED | OUTPATIENT
Start: 2024-05-25 | End: 2024-05-25

## 2024-05-25 RX ADMIN — BICTEGRAVIR SODIUM, EMTRICITABINE, AND TENOFOVIR ALAFENAMIDE FUMARATE 1 TABLET(S): 30; 120; 15 TABLET ORAL at 09:17

## 2024-05-25 RX ADMIN — Medication 5 MILLIGRAM(S): at 22:21

## 2024-05-25 RX ADMIN — MAGNESIUM OXIDE 400 MG ORAL TABLET 400 MILLIGRAM(S): 241.3 TABLET ORAL at 09:09

## 2024-05-25 RX ADMIN — MAGNESIUM OXIDE 400 MG ORAL TABLET 400 MILLIGRAM(S): 241.3 TABLET ORAL at 16:44

## 2024-05-25 RX ADMIN — Medication 5 MILLIGRAM(S): at 14:59

## 2024-05-25 RX ADMIN — MAGNESIUM OXIDE 400 MG ORAL TABLET 400 MILLIGRAM(S): 241.3 TABLET ORAL at 12:34

## 2024-05-25 RX ADMIN — METHOCARBAMOL 500 MILLIGRAM(S): 500 TABLET, FILM COATED ORAL at 14:59

## 2024-05-25 RX ADMIN — METHOCARBAMOL 500 MILLIGRAM(S): 500 TABLET, FILM COATED ORAL at 22:20

## 2024-05-25 RX ADMIN — METHOCARBAMOL 500 MILLIGRAM(S): 500 TABLET, FILM COATED ORAL at 05:20

## 2024-05-25 RX ADMIN — GABAPENTIN 900 MILLIGRAM(S): 400 CAPSULE ORAL at 22:20

## 2024-05-25 RX ADMIN — GABAPENTIN 900 MILLIGRAM(S): 400 CAPSULE ORAL at 05:20

## 2024-05-25 RX ADMIN — GABAPENTIN 900 MILLIGRAM(S): 400 CAPSULE ORAL at 14:59

## 2024-05-25 RX ADMIN — Medication 5 MILLIGRAM(S): at 05:20

## 2024-05-25 RX ADMIN — QUETIAPINE FUMARATE 300 MILLIGRAM(S): 200 TABLET, FILM COATED ORAL at 22:20

## 2024-05-25 RX ADMIN — QUETIAPINE FUMARATE 100 MILLIGRAM(S): 200 TABLET, FILM COATED ORAL at 09:17

## 2024-05-25 NOTE — PROGRESS NOTE ADULT - SUBJECTIVE AND OBJECTIVE BOX
Chart and meds reviewed.  Patient seen and examined.    CC/ HPI Patient is a 34y old  Male who presents with a chief complaint of FTT not safe for shelter (24 May 2024 12:11)      Subjective: 5/24Seen at bedside with Dr. Harper. Complains of difficulty walking.   5/25 no new events , MRI pending,    All other systems reviewed and found to be negative with the exception of what has been described above.    vitals reviewed  PHYSICAL EXAM:    Constitutional: NAD, awake and alert, well-developed  HEENT:  EOMI, Normal Hearing, MMM  Neck: Soft and supple, No LAD, No JVD  Respiratory: Breath sounds are clear bilaterally, No wheezing, rales or rhonchi  Cardiovascular: S1 and S2, regular rate and rhythm, no Murmurs, gallops or rubs  Gastrointestinal: Bowel Sounds present, soft, nontender, nondistended, no guarding, no rebound  Extremities: No peripheral edema  Vascular: 2+ peripheral pulses  Neurological: A/O x 3, no focal deficits  Musculoskeletal: 5/5 strength b/l upper extremities and lower extremities with 4/5 b/l  Skin: No rashes                          05-25    141  |  112<H>  |  7   ----------------------------<  97  3.6   |  26  |  0.88    Ca    9.0      25 May 2024 05:56  Mg     1.4     05-25                      Urinalysis Basic - ( 25 May 2024 05:56 )    Color: x / Appearance: x / SG: x / pH: x  Gluc: 97 mg/dL / Ketone: x  / Bili: x / Urobili: x   Blood: x / Protein: x / Nitrite: x   Leuk Esterase: x / RBC: x / WBC x   Sq Epi: x / Non Sq Epi: x / Bacteria: x            MEDICATIONS  (STANDING):  baclofen 5 milliGRAM(s) Oral every 8 hours  bictegravir 50 mG/emtricitabine 200 mG/tenofovir alafenamide 25 mG (BIKTARVY) 1 Tablet(s) Oral daily  gabapentin 900 milliGRAM(s) Oral three times a day  heparin   Injectable 5000 Unit(s) SubCutaneous every 12 hours  magnesium oxide 400 milliGRAM(s) Oral three times a day with meals  methocarbamol 500 milliGRAM(s) Oral three times a day  QUEtiapine 100 milliGRAM(s) Oral daily  QUEtiapine 300 milliGRAM(s) Oral at bedtime              Blood Culture:         < from: CT Abdomen and Pelvis w/wo IV Cont (05.14.24 @ 03:35) >  IMPRESSION:  Tiny focus of arterial enhancement in the region of the rectum, not   definitely seen on prior study of 10/4/2023, may reflecta focus of   active bleed.    < end of copied text >  < from: MR Lumbar Spine No Cont (05.10.24 @ 07:59) >  IMPRESSION:  Small left paracentral/foraminal disc protrusion which reaches and mildly   displaces the left S1 nerve root.  Correlate with symptoms.    --- End of Report ---< end of copied text >  < from: MR Head No Cont (05.10.24 @ 07:59) >  IMPRESSION:  No evidence of a mass or current evidence of acute ischemia.  Mild  volume loss with mild biparietal periventricular white matter   hyperintensity. Findings are stable from 2020. Findings may be related to   prior infection and/or demyelination. Correlate with patient's clinical   history,    --- End of Report ---    < end of copied text >    < from: MR Lumbar Spine No Cont (05.10.24 @ 07:59) >  IMPRESSION:  Small left paracentral/foraminal disc protrusion which reaches and mildly   displaces the left S1 nerve root.  Correlate with symptoms.    --- End of Report ---      < end of copied text >

## 2024-05-26 LAB
ANION GAP SERPL CALC-SCNC: 7 MMOL/L — SIGNIFICANT CHANGE UP (ref 5–17)
BUN SERPL-MCNC: 9 MG/DL — SIGNIFICANT CHANGE UP (ref 7–23)
CALCIUM SERPL-MCNC: 8.9 MG/DL — SIGNIFICANT CHANGE UP (ref 8.5–10.1)
CHLORIDE SERPL-SCNC: 108 MMOL/L — SIGNIFICANT CHANGE UP (ref 96–108)
CO2 SERPL-SCNC: 23 MMOL/L — SIGNIFICANT CHANGE UP (ref 22–31)
CREAT SERPL-MCNC: 1 MG/DL — SIGNIFICANT CHANGE UP (ref 0.5–1.3)
EGFR: 101 ML/MIN/1.73M2 — SIGNIFICANT CHANGE UP
GLUCOSE SERPL-MCNC: 101 MG/DL — HIGH (ref 70–99)
MAGNESIUM SERPL-MCNC: 1.6 MG/DL — SIGNIFICANT CHANGE UP (ref 1.6–2.6)
POTASSIUM SERPL-MCNC: 3.3 MMOL/L — LOW (ref 3.5–5.3)
POTASSIUM SERPL-SCNC: 3.3 MMOL/L — LOW (ref 3.5–5.3)
SODIUM SERPL-SCNC: 138 MMOL/L — SIGNIFICANT CHANGE UP (ref 135–145)

## 2024-05-26 PROCEDURE — 99232 SBSQ HOSP IP/OBS MODERATE 35: CPT

## 2024-05-26 RX ORDER — MAGNESIUM SULFATE 500 MG/ML
1 VIAL (ML) INJECTION ONCE
Refills: 0 | Status: COMPLETED | OUTPATIENT
Start: 2024-05-26 | End: 2024-05-26

## 2024-05-26 RX ORDER — POTASSIUM CHLORIDE 20 MEQ
40 PACKET (EA) ORAL ONCE
Refills: 0 | Status: COMPLETED | OUTPATIENT
Start: 2024-05-26 | End: 2024-05-26

## 2024-05-26 RX ADMIN — BICTEGRAVIR SODIUM, EMTRICITABINE, AND TENOFOVIR ALAFENAMIDE FUMARATE 1 TABLET(S): 30; 120; 15 TABLET ORAL at 09:05

## 2024-05-26 RX ADMIN — QUETIAPINE FUMARATE 100 MILLIGRAM(S): 200 TABLET, FILM COATED ORAL at 09:06

## 2024-05-26 RX ADMIN — QUETIAPINE FUMARATE 300 MILLIGRAM(S): 200 TABLET, FILM COATED ORAL at 22:41

## 2024-05-26 RX ADMIN — MAGNESIUM OXIDE 400 MG ORAL TABLET 400 MILLIGRAM(S): 241.3 TABLET ORAL at 13:47

## 2024-05-26 RX ADMIN — Medication 3 MILLIGRAM(S): at 22:41

## 2024-05-26 RX ADMIN — METHOCARBAMOL 500 MILLIGRAM(S): 500 TABLET, FILM COATED ORAL at 13:47

## 2024-05-26 RX ADMIN — METHOCARBAMOL 500 MILLIGRAM(S): 500 TABLET, FILM COATED ORAL at 22:41

## 2024-05-26 RX ADMIN — MAGNESIUM OXIDE 400 MG ORAL TABLET 400 MILLIGRAM(S): 241.3 TABLET ORAL at 09:06

## 2024-05-26 RX ADMIN — Medication 40 MILLIEQUIVALENT(S): at 13:47

## 2024-05-26 RX ADMIN — METHOCARBAMOL 500 MILLIGRAM(S): 500 TABLET, FILM COATED ORAL at 09:05

## 2024-05-26 RX ADMIN — GABAPENTIN 900 MILLIGRAM(S): 400 CAPSULE ORAL at 13:47

## 2024-05-26 RX ADMIN — Medication 5 MILLIGRAM(S): at 22:41

## 2024-05-26 RX ADMIN — GABAPENTIN 900 MILLIGRAM(S): 400 CAPSULE ORAL at 09:04

## 2024-05-26 RX ADMIN — Medication 5 MILLIGRAM(S): at 13:47

## 2024-05-26 RX ADMIN — Medication 5 MILLIGRAM(S): at 09:05

## 2024-05-26 RX ADMIN — GABAPENTIN 900 MILLIGRAM(S): 400 CAPSULE ORAL at 22:41

## 2024-05-26 NOTE — PROGRESS NOTE ADULT - SUBJECTIVE AND OBJECTIVE BOX
Chart and meds reviewed.  Patient seen and examined.    CC/ HPI Patient is a 34y old  Male who presents with a chief complaint of FTT not safe for shelter (24 May 2024 12:11)      Subjective: 5/24Seen at bedside with Dr. Harper. Complains of difficulty walking.   5/25 no new events , MRI pending,  5/26: no events overnight. No new complaints. Chronic low back pain unchanged. Able to ambulate but with some uneasiness that has not changed since admission. Pending MRI with anethesia    All other systems reviewed and found to be negative with the exception of what has been described above.    vitals reviewed  PHYSICAL EXAM:  T(C): 36.9 (05-26-24 @ 07:56), Max: 36.9 (05-26-24 @ 07:56)  HR: 86 (05-26-24 @ 07:56) (81 - 93)  BP: 100/67 (05-26-24 @ 07:56) (100/67 - 133/81)  RR: 18 (05-26-24 @ 07:56) (18 - 18)  SpO2: 99% (05-26-24 @ 07:56) (97% - 99%)  Constitutional: NAD, awake and alert, well-developed  HEENT:  EOMI, Normal Hearing, MMM  Neck: Soft and supple, No LAD, No JVD  Respiratory: Breath sounds are clear bilaterally, No wheezing, rales or rhonchi  Cardiovascular: S1 and S2, regular rate and rhythm, no Murmurs, gallops or rubs  Gastrointestinal: Bowel Sounds present, soft, nontender, nondistended, no guarding, no rebound  Extremities: No peripheral edema  Vascular: 2+ peripheral pulses  Neurological: A/O x 3, no focal deficits  Musculoskeletal: 5/5 strength b/l upper extremities and lower extremities with 4/5 b/l  Skin: No rashes                  05-25    141  |  112<H>  |  7   ----------------------------<  97  3.6   |  26  |  0.88    Ca    9.0      25 May 2024 05:56  Mg     1.4     05-25                      Urinalysis Basic - ( 25 May 2024 05:56 )    Color: x / Appearance: x / SG: x / pH: x  Gluc: 97 mg/dL / Ketone: x  / Bili: x / Urobili: x   Blood: x / Protein: x / Nitrite: x   Leuk Esterase: x / RBC: x / WBC x   Sq Epi: x / Non Sq Epi: x / Bacteria: x            MEDICATIONS  (STANDING):  baclofen 5 milliGRAM(s) Oral every 8 hours  bictegravir 50 mG/emtricitabine 200 mG/tenofovir alafenamide 25 mG (BIKTARVY) 1 Tablet(s) Oral daily  gabapentin 900 milliGRAM(s) Oral three times a day  heparin   Injectable 5000 Unit(s) SubCutaneous every 12 hours  magnesium oxide 400 milliGRAM(s) Oral three times a day with meals  methocarbamol 500 milliGRAM(s) Oral three times a day  QUEtiapine 100 milliGRAM(s) Oral daily  QUEtiapine 300 milliGRAM(s) Oral at bedtime              Blood Culture:         < from: CT Abdomen and Pelvis w/wo IV Cont (05.14.24 @ 03:35) >  IMPRESSION:  Tiny focus of arterial enhancement in the region of the rectum, not   definitely seen on prior study of 10/4/2023, may reflecta focus of   active bleed.    < end of copied text >  < from: MR Lumbar Spine No Cont (05.10.24 @ 07:59) >  IMPRESSION:  Small left paracentral/foraminal disc protrusion which reaches and mildly   displaces the left S1 nerve root.  Correlate with symptoms.    --- End of Report ---< end of copied text >  < from: MR Head No Cont (05.10.24 @ 07:59) >  IMPRESSION:  No evidence of a mass or current evidence of acute ischemia.  Mild  volume loss with mild biparietal periventricular white matter   hyperintensity. Findings are stable from 2020. Findings may be related to   prior infection and/or demyelination. Correlate with patient's clinical   history,    --- End of Report ---    < end of copied text >    < from: MR Lumbar Spine No Cont (05.10.24 @ 07:59) >  IMPRESSION:  Small left paracentral/foraminal disc protrusion which reaches and mildly   displaces the left S1 nerve root.  Correlate with symptoms.    --- End of Report ---      < end of copied text >

## 2024-05-27 VITALS
HEART RATE: 69 BPM | TEMPERATURE: 98 F | OXYGEN SATURATION: 98 % | DIASTOLIC BLOOD PRESSURE: 76 MMHG | RESPIRATION RATE: 18 BRPM | SYSTOLIC BLOOD PRESSURE: 128 MMHG

## 2024-05-27 LAB
ANION GAP SERPL CALC-SCNC: 8 MMOL/L — SIGNIFICANT CHANGE UP (ref 5–17)
BUN SERPL-MCNC: 7 MG/DL — SIGNIFICANT CHANGE UP (ref 7–23)
CALCIUM SERPL-MCNC: 9 MG/DL — SIGNIFICANT CHANGE UP (ref 8.5–10.1)
CHLORIDE SERPL-SCNC: 109 MMOL/L — HIGH (ref 96–108)
CO2 SERPL-SCNC: 24 MMOL/L — SIGNIFICANT CHANGE UP (ref 22–31)
CREAT SERPL-MCNC: 0.97 MG/DL — SIGNIFICANT CHANGE UP (ref 0.5–1.3)
EGFR: 104 ML/MIN/1.73M2 — SIGNIFICANT CHANGE UP
FERRITIN SERPL-MCNC: 45 NG/ML — SIGNIFICANT CHANGE UP (ref 30–400)
GLUCOSE SERPL-MCNC: 154 MG/DL — HIGH (ref 70–99)
HCT VFR BLD CALC: 39.3 % — SIGNIFICANT CHANGE UP (ref 39–50)
HGB BLD-MCNC: 12.9 G/DL — LOW (ref 13–17)
IRON SATN MFR SERPL: 130 UG/DL — SIGNIFICANT CHANGE UP (ref 45–165)
IRON SATN MFR SERPL: 50 % — SIGNIFICANT CHANGE UP (ref 16–55)
MAGNESIUM SERPL-MCNC: 1.5 MG/DL — LOW (ref 1.6–2.6)
PHOSPHATE SERPL-MCNC: 1.4 MG/DL — LOW (ref 2.5–4.5)
POTASSIUM SERPL-MCNC: 3.4 MMOL/L — LOW (ref 3.5–5.3)
POTASSIUM SERPL-SCNC: 3.4 MMOL/L — LOW (ref 3.5–5.3)
SODIUM SERPL-SCNC: 141 MMOL/L — SIGNIFICANT CHANGE UP (ref 135–145)
TIBC SERPL-MCNC: 259 UG/DL — SIGNIFICANT CHANGE UP (ref 220–430)
UIBC SERPL-MCNC: 128 UG/DL — SIGNIFICANT CHANGE UP (ref 110–370)
VIT B12 SERPL-MCNC: 551 PG/ML — SIGNIFICANT CHANGE UP (ref 232–1245)

## 2024-05-27 PROCEDURE — 99232 SBSQ HOSP IP/OBS MODERATE 35: CPT

## 2024-05-27 RX ORDER — SODIUM,POTASSIUM PHOSPHATES 278-250MG
2 POWDER IN PACKET (EA) ORAL THREE TIMES A DAY
Refills: 0 | Status: DISCONTINUED | OUTPATIENT
Start: 2024-05-27 | End: 2024-05-28

## 2024-05-27 RX ORDER — MAGNESIUM OXIDE 400 MG ORAL TABLET 241.3 MG
400 TABLET ORAL
Refills: 0 | Status: DISCONTINUED | OUTPATIENT
Start: 2024-05-27 | End: 2024-05-27

## 2024-05-27 RX ORDER — SODIUM,POTASSIUM PHOSPHATES 278-250MG
1 POWDER IN PACKET (EA) ORAL
Refills: 0 | Status: DISCONTINUED | OUTPATIENT
Start: 2024-05-27 | End: 2024-05-27

## 2024-05-27 RX ORDER — MAGNESIUM OXIDE 400 MG ORAL TABLET 241.3 MG
800 TABLET ORAL
Refills: 0 | Status: DISCONTINUED | OUTPATIENT
Start: 2024-05-27 | End: 2024-05-28

## 2024-05-27 RX ADMIN — GABAPENTIN 900 MILLIGRAM(S): 400 CAPSULE ORAL at 23:03

## 2024-05-27 RX ADMIN — Medication 650 MILLIGRAM(S): at 13:02

## 2024-05-27 RX ADMIN — MAGNESIUM OXIDE 400 MG ORAL TABLET 800 MILLIGRAM(S): 241.3 TABLET ORAL at 19:01

## 2024-05-27 RX ADMIN — QUETIAPINE FUMARATE 300 MILLIGRAM(S): 200 TABLET, FILM COATED ORAL at 23:04

## 2024-05-27 RX ADMIN — MAGNESIUM OXIDE 400 MG ORAL TABLET 400 MILLIGRAM(S): 241.3 TABLET ORAL at 13:00

## 2024-05-27 RX ADMIN — METHOCARBAMOL 500 MILLIGRAM(S): 500 TABLET, FILM COATED ORAL at 13:02

## 2024-05-27 RX ADMIN — MAGNESIUM OXIDE 400 MG ORAL TABLET 400 MILLIGRAM(S): 241.3 TABLET ORAL at 09:15

## 2024-05-27 RX ADMIN — MAGNESIUM OXIDE 400 MG ORAL TABLET 800 MILLIGRAM(S): 241.3 TABLET ORAL at 16:30

## 2024-05-27 RX ADMIN — METHOCARBAMOL 500 MILLIGRAM(S): 500 TABLET, FILM COATED ORAL at 05:58

## 2024-05-27 RX ADMIN — GABAPENTIN 900 MILLIGRAM(S): 400 CAPSULE ORAL at 05:58

## 2024-05-27 RX ADMIN — Medication 5 MILLIGRAM(S): at 13:01

## 2024-05-27 RX ADMIN — Medication 30 MILLILITER(S): at 00:14

## 2024-05-27 RX ADMIN — METHOCARBAMOL 500 MILLIGRAM(S): 500 TABLET, FILM COATED ORAL at 23:04

## 2024-05-27 RX ADMIN — Medication 2 TABLET(S): at 23:03

## 2024-05-27 RX ADMIN — Medication 5 MILLIGRAM(S): at 09:14

## 2024-05-27 RX ADMIN — Medication 2 TABLET(S): at 16:30

## 2024-05-27 RX ADMIN — BICTEGRAVIR SODIUM, EMTRICITABINE, AND TENOFOVIR ALAFENAMIDE FUMARATE 1 TABLET(S): 30; 120; 15 TABLET ORAL at 09:15

## 2024-05-27 RX ADMIN — QUETIAPINE FUMARATE 100 MILLIGRAM(S): 200 TABLET, FILM COATED ORAL at 09:15

## 2024-05-27 RX ADMIN — GABAPENTIN 900 MILLIGRAM(S): 400 CAPSULE ORAL at 13:01

## 2024-05-27 RX ADMIN — Medication 5 MILLIGRAM(S): at 23:03

## 2024-05-27 NOTE — PROGRESS NOTE ADULT - NUTRITIONAL ASSESSMENT
This patient has been assessed with a concern for Malnutrition and has been determined to have a diagnosis/diagnoses of Severe protein-calorie malnutrition.    This patient is being managed with:   Diet Regular-  Entered: May 23 2024  3:05PM  

## 2024-05-27 NOTE — PROGRESS NOTE ADULT - SUBJECTIVE AND OBJECTIVE BOX
Chart and meds reviewed.  Patient seen and examined.    CC/ HPI Patient is a 34y old  Male who presents with a chief complaint of FTT not safe for shelter (24 May 2024 12:11)      Subjective: 5/24Seen at bedside with Dr. Harper. Complains of difficulty walking.   5/25 no new events , MRI pending,  5/26: no events overnight. No new complaints. Chronic low back pain unchanged. Able to ambulate but with some uneasiness that has not changed since admission. Pending MRI with anethesia  5/27: Now refusing mri. No acute issues/events. Discharge pending shelter placement.     All other systems reviewed and found to be negative with the exception of what has been described above.    vitals reviewed  PHYSICAL EXAM:  T(C): 36.7 (05-27-24 @ 07:51), Max: 37 (05-26-24 @ 15:23)  HR: 66 (05-27-24 @ 07:51) (66 - 92)  BP: 127/77 (05-27-24 @ 07:51) (104/71 - 143/73)  RR: 17 (05-27-24 @ 07:51) (17 - 18)  SpO2: 100% (05-27-24 @ 07:51) (97% - 100%)  Constitutional: NAD, awake and alert, well-developed  HEENT:  EOMI, Normal Hearing, MMM  Neck: Soft and supple, No LAD, No JVD  Respiratory: Breath sounds are clear bilaterally, No wheezing, rales or rhonchi  Cardiovascular: S1 and S2, regular rate and rhythm, no Murmurs, gallops or rubs  Gastrointestinal: Bowel Sounds present, soft, nontender, nondistended, no guarding, no rebound  Extremities: No peripheral edema  Vascular: 2+ peripheral pulses  Neurological: A/O x 3, no focal deficits  Musculoskeletal: 5/5 strength b/l upper extremities and lower extremities with 4/5 b/l  Skin: No rashes                              12.9   x     )-----------( x        ( 27 May 2024 10:34 )             39.3     05-27    141  |  109<H>  |  7   ----------------------------<  154<H>  3.4<L>   |  24  |  0.97    Ca    9.0      27 May 2024 10:34  Phos  1.4     05-27  Mg     1.5     05-27      SARS-CoV-2: NotDetec (08 May 2024 00:25)    CAPILLARY BLOOD GLUCOSE                  05-25    141  |  112<H>  |  7   ----------------------------<  97  3.6   |  26  |  0.88    Ca    9.0      25 May 2024 05:56  Mg     1.4     05-25                      Urinalysis Basic - ( 25 May 2024 05:56 )    Color: x / Appearance: x / SG: x / pH: x  Gluc: 97 mg/dL / Ketone: x  / Bili: x / Urobili: x   Blood: x / Protein: x / Nitrite: x   Leuk Esterase: x / RBC: x / WBC x   Sq Epi: x / Non Sq Epi: x / Bacteria: x            MEDICATIONS  (STANDING):  baclofen 5 milliGRAM(s) Oral every 8 hours  bictegravir 50 mG/emtricitabine 200 mG/tenofovir alafenamide 25 mG (BIKTARVY) 1 Tablet(s) Oral daily  gabapentin 900 milliGRAM(s) Oral three times a day  heparin   Injectable 5000 Unit(s) SubCutaneous every 12 hours  magnesium oxide 400 milliGRAM(s) Oral three times a day with meals  methocarbamol 500 milliGRAM(s) Oral three times a day  QUEtiapine 100 milliGRAM(s) Oral daily  QUEtiapine 300 milliGRAM(s) Oral at bedtime              Blood Culture:         < from: CT Abdomen and Pelvis w/wo IV Cont (05.14.24 @ 03:35) >  IMPRESSION:  Tiny focus of arterial enhancement in the region of the rectum, not   definitely seen on prior study of 10/4/2023, may reflecta focus of   active bleed.    < end of copied text >  < from: MR Lumbar Spine No Cont (05.10.24 @ 07:59) >  IMPRESSION:  Small left paracentral/foraminal disc protrusion which reaches and mildly   displaces the left S1 nerve root.  Correlate with symptoms.    --- End of Report ---< end of copied text >  < from: MR Head No Cont (05.10.24 @ 07:59) >  IMPRESSION:  No evidence of a mass or current evidence of acute ischemia.  Mild  volume loss with mild biparietal periventricular white matter   hyperintensity. Findings are stable from 2020. Findings may be related to   prior infection and/or demyelination. Correlate with patient's clinical   history,    --- End of Report ---    < end of copied text >    < from: MR Lumbar Spine No Cont (05.10.24 @ 07:59) >  IMPRESSION:  Small left paracentral/foraminal disc protrusion which reaches and mildly   displaces the left S1 nerve root.  Correlate with symptoms.    --- End of Report ---      < end of copied text >

## 2024-05-27 NOTE — PROGRESS NOTE ADULT - ASSESSMENT
34-year-old male presents with complaint of weakness.  Patient with history of HIV, GBS ( few years back)   states  he is just getting  progressively worsening.       Problem/Plan - 1:  ·  Problem: Weakness.   ·  Plan: MRI LS spine  -Physical therapy  - DC to Shelter if repeat MRI lumbar unremarkable for acute process    Problem/Plan - 2:  ·  Problem: Adult failure to thrive.     Problem/Plan - 3:  ·  Problem: HIV disease.   -Home medication        DISPOSITION: DC planning to Shelter as patient refusing [facility] placement and not a candidate for Acute rehab  
34-year-old male presents with complaint of weakness.  Patient with history of HIV, GBS ( few years back)   states  he is just getting  progressively worsening.       Problem/Plan - 1:  ·  Problem: Weakness.   ·  Plan: MRI LS spine  -Physical therapy  - DC to Shelter.    Problem/Plan - 2:  ·  Problem: Adult failure to thrive.     Problem/Plan - 3:  ·  Problem: HIV disease.         DISPOSITION: DC planning to Shelter as patient refusing [facility] placement and not a candidate for Acute rehab  
34-year-old male presents with complaint of weakness.  Patient with history of HIV, GBS ( few years back)   states  he is just getting  progressively worsening.       Problem/Plan - 1:  ·  Problem: Weakness.   ·  Plan: MRI LS spine (patient refusing)  -Physical therapy  - DC to Shelter when arranged    Problem/Plan - 2:  ·  Problem: Adult failure to thrive.     Problem/Plan - 3:  ·  Problem: HIV disease.   -Home medication        DISPOSITION: DC planning to Shelter as patient refusing [facility] placement and not a candidate for Acute rehab. Pending shelter placement. Few electrolyte abnormalities. Replacing accordingly  
34-year-old male presents with complaint of weakness.  Patient with history of HIV, GBS ( few years back)   states  he is just getting  progressively worsening.

## 2024-05-28 ENCOUNTER — TRANSCRIPTION ENCOUNTER (OUTPATIENT)
Age: 34
End: 2024-05-28

## 2024-05-28 PROCEDURE — 99239 HOSP IP/OBS DSCHRG MGMT >30: CPT

## 2024-05-28 RX ORDER — MAGNESIUM OXIDE 400 MG ORAL TABLET 241.3 MG
2 TABLET ORAL
Qty: 20 | Refills: 0
Start: 2024-05-28 | End: 2024-06-01

## 2024-05-28 RX ORDER — SODIUM,POTASSIUM PHOSPHATES 278-250MG
2 POWDER IN PACKET (EA) ORAL
Qty: 18 | Refills: 0
Start: 2024-05-28 | End: 2024-05-30

## 2024-05-28 RX ADMIN — Medication 5 MILLIGRAM(S): at 06:11

## 2024-05-28 RX ADMIN — MAGNESIUM OXIDE 400 MG ORAL TABLET 800 MILLIGRAM(S): 241.3 TABLET ORAL at 09:18

## 2024-05-28 RX ADMIN — GABAPENTIN 900 MILLIGRAM(S): 400 CAPSULE ORAL at 06:11

## 2024-05-28 RX ADMIN — METHOCARBAMOL 500 MILLIGRAM(S): 500 TABLET, FILM COATED ORAL at 06:11

## 2024-05-28 RX ADMIN — BICTEGRAVIR SODIUM, EMTRICITABINE, AND TENOFOVIR ALAFENAMIDE FUMARATE 1 TABLET(S): 30; 120; 15 TABLET ORAL at 09:19

## 2024-05-28 RX ADMIN — QUETIAPINE FUMARATE 100 MILLIGRAM(S): 200 TABLET, FILM COATED ORAL at 09:18

## 2024-05-28 RX ADMIN — Medication 2 TABLET(S): at 06:11

## 2024-05-28 NOTE — DISCHARGE NOTE PROVIDER - CARE PROVIDER_API CALL
KIRBY VAZQUEZ  [address] [place] [address] [place] [address]  Phone: [phone]  Fax: [phone]  Established Patient  Follow Up Time: 1 week

## 2024-05-28 NOTE — DISCHARGE NOTE NURSING/CASE MANAGEMENT/SOCIAL WORK - NSDCVIVACCINE_GEN_ALL_CORE_FT
Tdap; 09-Jul-2022 05:12; Annabelle Curiel (RN); [employer]; [id number] (Exp. Date: 11-Mar-2024); IntraMuscular; Deltoid Right.; 0.5 milliLiter(s); VIS (VIS Published: 09-May-2013, VIS Presented: 09-Jul-2022);

## 2024-05-28 NOTE — DISCHARGE NOTE NURSING/CASE MANAGEMENT/SOCIAL WORK - NSDCPEFALRISK_GEN_ALL_CORE
For information on Fall & Injury Prevention, visit: [email or web address] OR  [email or web address] OR  https://www.cdc.gov/steadi/patient.html

## 2024-05-28 NOTE — DISCHARGE NOTE PROVIDER - NSDCCPCAREPLAN_GEN_ALL_CORE_FT
PRINCIPAL DISCHARGE DIAGNOSIS  Diagnosis: Subjective weakness  Assessment and Plan of Treatment: Reporting weakness in your legs. Recent MRI with some herniated disks and buldging. Repeat MRI ordered howver you deferred repeat MRI. No other acute process identified. Follow up closely with your primary medical provider.      SECONDARY DISCHARGE DIAGNOSES  Diagnosis: Hypomagnesemia  Assessment and Plan of Treatment: Low magnesium level. Can potentiate weakness. Take oral magnesium supplementation and follow up with your primary in the week for repeat blood work and additional supplementation should it be necessary    Diagnosis: Hypophosphatemia  Assessment and Plan of Treatment: Low phosphorus level. Can potentiate weakness. Take oral phosphorus supplementation and follow up with your primary in the week for repeat blood work and additional supplementation should it be necessary

## 2024-05-28 NOTE — DISCHARGE NOTE PROVIDER - HOSPITAL COURSE
FROM H&P:    "34-year-old male presents with complaint of weakness.  Patient with history of HIV, GBS ( few years back)   states  he is just getting  progressively worsening.  He reports difficulty caring for himself and states in the past he used to live in a skilled nursing facility and feels that he is at that point again in his life where he needs to have more help.  Denies any fevers, injuries or increased pain. Not safe for dc to shelter, adm for further w/up; incomplete PT eval re: walk due to weakness; plan for MRI."      Problem: Weakness.   ·  Plan: MRI LS spine (patient refusing). Recent MRI unremarkable.   -Physical therapy  - DC to Shelter when arranged. Patient not wanting to wait for shelter arrangement and requesting to leave    Problem/Plan - 2:  ·  Problem: Adult failure to thrive.     Problem/Plan - 3:  ·  Problem: HIV disease.   -Home medication        DISPOSITION: DC to Shelter when arranged. Patient not wanting to wait for shelter arrangement and requesting to leave. Refused blood tests. Prescriptions provided for magnesium and phosphorus and advised to get repeat blood work in 1 week with primary. Discharge home (refusing [facility] and waiting for shelter placement) and close outpatient follow up.     T(C): 36.5 (05-27-24 @ 23:06), Max: 36.5 (05-27-24 @ 23:06)  HR: 69 (05-27-24 @ 23:06) (69 - 86)  BP: 128/76 (05-27-24 @ 23:06) (126/81 - 128/76)  RR: 18 (05-27-24 @ 23:06) (18 - 18)  SpO2: 98% (05-27-24 @ 23:06) (97% - 98%)  Constitutional: NAD, awake and alert, well-developed  HEENT:  EOMI, Normal Hearing, MMM  Neck: Soft and supple, No LAD, No JVD  Respiratory: Breath sounds are clear bilaterally, No wheezing, rales or rhonchi  Cardiovascular: S1 and S2, regular rate and rhythm, no Murmurs, gallops or rubs  Gastrointestinal: Bowel Sounds present, soft, nontender, nondistended, no guarding, no rebound  Extremities: No peripheral edema  Vascular: 2+ peripheral pulses  Neurological: A/O x 3, no focal deficits  Musculoskeletal: 5/5 strength b/l upper extremities and lower extremities with 4/5 b/l  Skin: No rashes  Discharge Management: 37 minutes  Date of Service/Discharge: 5/28/2024

## 2024-05-28 NOTE — DISCHARGE NOTE NURSING/CASE MANAGEMENT/SOCIAL WORK - PATIENT PORTAL LINK FT
You can access the FollowMyHealth Patient Portal offered by [facility] by registering at the following website: [email or web address]. By joining [facility]’s FollowMyHealth portal, you will also be able to view your health information using other applications (apps) compatible with our system.

## 2024-05-28 NOTE — DISCHARGE NOTE PROVIDER - NSDCACTIVITY_GEN_ALL_CORE
Ambulation/Activity as tolerated with assistance/assistive device(s) if required./No restrictions/Do not drive or operate machinery

## 2024-05-28 NOTE — DISCHARGE NOTE PROVIDER - NSDCMRMEDTOKEN_GEN_ALL_CORE_FT
Albuterol (Eqv-Proventil HFA) 90 mcg/inh inhalation aerosol: 2 puff(s) inhaled every 6 hours as needed for  shortness of breath and/or wheezing  baclofen 10 mg oral tablet: 1 tab(s) orally 3 times a day  bictegravir/emtricitabine/tenofovir 50 mg-200 mg-25 mg oral tablet: 1 tab(s) orally once a day MDD: 1 tablet  gabapentin 300 mg oral capsule: 3 cap(s) orally 3 times a day ***DrFirst states twice daily- pt said he takes 3 times a day***  K-Phos Neutral oral tablet: 2 tab(s) orally 3 times a day  magnesium oxide 400 mg oral tablet: 2 tab(s) orally 2 times a day (with meals)  QUEtiapine 100 mg oral tablet: 1 tab(s) orally once a day (in the morning)  QUEtiapine 300 mg oral tablet: 1 tab(s) orally once a day (at bedtime)  Robaxin 500 mg oral tablet: 1 tab(s) orally 3 times a day

## 2024-05-29 ENCOUNTER — EMERGENCY (EMERGENCY)
Facility: HOSPITAL | Age: 34
LOS: 0 days | Discharge: HOME | End: 2024-05-30
Attending: EMERGENCY MEDICINE
Payer: MEDICAID

## 2024-05-29 VITALS
DIASTOLIC BLOOD PRESSURE: 84 MMHG | SYSTOLIC BLOOD PRESSURE: 188 MMHG | WEIGHT: 210.1 LBS | OXYGEN SATURATION: 98 % | RESPIRATION RATE: 18 BRPM | HEART RATE: 96 BPM | HEIGHT: 72 IN | TEMPERATURE: 98 F

## 2024-05-29 DIAGNOSIS — Z88.8 ALLERGY STATUS TO OTHER DRUGS, MEDICAMENTS AND BIOLOGICAL SUBSTANCES: ICD-10-CM

## 2024-05-29 DIAGNOSIS — Z21 ASYMPTOMATIC HUMAN IMMUNODEFICIENCY VIRUS [HIV] INFECTION STATUS: ICD-10-CM

## 2024-05-29 DIAGNOSIS — Z98.890 OTHER SPECIFIED POSTPROCEDURAL STATES: Chronic | ICD-10-CM

## 2024-05-29 DIAGNOSIS — K62.5 HEMORRHAGE OF ANUS AND RECTUM: ICD-10-CM

## 2024-05-29 DIAGNOSIS — A63.0 ANOGENITAL (VENEREAL) WARTS: ICD-10-CM

## 2024-05-29 DIAGNOSIS — Z59.00 HOMELESSNESS UNSPECIFIED: ICD-10-CM

## 2024-05-29 DIAGNOSIS — G61.0 GUILLAIN-BARRE SYNDROME: ICD-10-CM

## 2024-05-29 PROCEDURE — 99283 EMERGENCY DEPT VISIT LOW MDM: CPT | Mod: 25

## 2024-05-29 SDOH — ECONOMIC STABILITY - HOUSING INSECURITY: HOMELESSNESS UNSPECIFIED: Z59.00

## 2024-05-29 NOTE — ED ADULT NURSE NOTE - ED STAT RN HANDOFF DETAILS 2
Handoff given to oncoming RN Luzmaria. Plan of care reviewed, Pt concerns and pending orders endorsed.

## 2024-05-29 NOTE — ED ADULT NURSE NOTE - ED STAT RN HANDOFF DETAILS
Report given to ANDREA Mitchell for my break coverage; plan of care, pending labs / rads, and issues brought up by pt endorsed to covering RN.

## 2024-05-29 NOTE — ED ADULT TRIAGE NOTE - CHIEF COMPLAINT QUOTE
biba  c/o rectal bleeding x 3 days with lower abdominal pain and b/l lower extremity pain/weakness states fell today with l knee pain denies head injury hx guillan barre and hiv

## 2024-05-29 NOTE — ED CLERICAL - NS ED CARE COORDINATION INFORMATION
This patient is eligible for (or currently enrolled in) an outpatient care management program available through [facility]. This program can coordinate outpatient follow up and assist the patient in accessing a variety of outpatient resources.  If discharged from the ED, the patient will be contacted to see if any additional resources are needed.                                                                                    Please call the Nurse Clinical Call Center at [phone] with any questions or for assistance in discharge planning.

## 2024-05-29 NOTE — ED ADULT NURSE NOTE - OBJECTIVE STATEMENT
35 yo male c/o diarrhea accompanied by bright red blood for x3 days; states having approx 2-3 diarrhea episodes per day. Pt aox4, bed bound at this time due to bilat lower extremity weakness (hx GBS, states this weakness feels like a GBS flare up). Pt also c/o left knee pain 2/2 fall today, pt tripped, denies injury to head or LOC. Denies numbness/tingling of bilat lower extremities. also c/o chills (-) CP, SOB, n/v, dizziness.

## 2024-05-29 NOTE — ED ADULT NURSE NOTE - NSFALLRISKINTERV_ED_ALL_ED
Assistance OOB with selected safe patient handling equipment if applicable/Assistance with ambulation/Communicate fall risk and risk factors to all staff, patient, and family/Monitor gait and stability/Provide visual cue: yellow wristband, yellow gown, etc/Reinforce activity limits and safety measures with patient and family/Call bell, personal items and telephone in reach/Instruct patient to call for assistance before getting out of bed/chair/stretcher/Non-slip footwear applied when patient is off stretcher/[place] to call system/Physically safe environment - no spills, clutter or unnecessary equipment/Purposeful Proactive Rounding/Room/bathroom lighting operational, light cord in reach

## 2024-05-29 NOTE — ED ADULT NURSE NOTE - CAS DISCH TRANSFER METHOD
From Dr. Pleitez in secured chat, Please let the patient know:  I received a copy of a letter she may have received regarding needing f/u testing to tests done with cardiology. I reviewed her 11/22 testing which showed some pulmonary nodules that were suggested to be followed up in 3m. If she is agreeable, I can order a CT chest w/o contrast to evaluate further  Schedule physical in 2-3 months    Called and reviewed recommendations with patient, she is agreeable, so will place order.   Scheduled physical     Private car

## 2024-05-30 ENCOUNTER — EMERGENCY (EMERGENCY)
Facility: HOSPITAL | Age: 34
LOS: 0 days | Discharge: HOME | End: 2024-05-31
Attending: STUDENT IN AN ORGANIZED HEALTH CARE EDUCATION/TRAINING PROGRAM
Payer: MEDICAID

## 2024-05-30 VITALS
OXYGEN SATURATION: 100 % | RESPIRATION RATE: 16 BRPM | HEART RATE: 99 BPM | DIASTOLIC BLOOD PRESSURE: 85 MMHG | HEIGHT: 72 IN | WEIGHT: 210.1 LBS | TEMPERATURE: 98 F | SYSTOLIC BLOOD PRESSURE: 122 MMHG

## 2024-05-30 VITALS
OXYGEN SATURATION: 100 % | DIASTOLIC BLOOD PRESSURE: 72 MMHG | TEMPERATURE: 98 F | SYSTOLIC BLOOD PRESSURE: 110 MMHG | RESPIRATION RATE: 19 BRPM | HEART RATE: 82 BPM
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
## 2024-05-30 DIAGNOSIS — Z91.018 ALLERGY TO OTHER FOODS: ICD-10-CM

## 2024-05-30 DIAGNOSIS — G61.0 GUILLAIN-BARRE SYNDROME: ICD-10-CM

## 2024-05-30 DIAGNOSIS — M25.562 PAIN IN LEFT KNEE: ICD-10-CM
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
## 2024-05-30 DIAGNOSIS — Z21 ASYMPTOMATIC HUMAN IMMUNODEFICIENCY VIRUS [HIV] INFECTION STATUS: ICD-10-CM

## 2024-05-30 DIAGNOSIS — Z88.6 ALLERGY STATUS TO ANALGESIC AGENT: ICD-10-CM

## 2024-05-30 DIAGNOSIS — J45.909 UNSPECIFIED ASTHMA, UNCOMPLICATED: ICD-10-CM

## 2024-05-30 DIAGNOSIS — Z88.1 ALLERGY STATUS TO OTHER ANTIBIOTIC AGENTS STATUS: ICD-10-CM
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
## 2024-05-30 PROCEDURE — 73562 X-RAY EXAM OF KNEE 3: CPT | Mod: 26,LT

## 2024-05-30 PROCEDURE — 99283 EMERGENCY DEPT VISIT LOW MDM: CPT | Mod: 25

## 2024-05-30 PROCEDURE — 99283 EMERGENCY DEPT VISIT LOW MDM: CPT

## 2024-05-30 PROCEDURE — 73562 X-RAY EXAM OF KNEE 3: CPT | Mod: LT

## 2024-05-30 RX ORDER — IMIQUIMOD 50 MG/G
1 CREAM TOPICAL
Qty: 1 | Refills: 0
Start: 2024-05-30

## 2024-05-30 RX ORDER — ACETAMINOPHEN 500 MG
650 TABLET ORAL ONCE
Refills: 0 | Status: COMPLETED | OUTPATIENT
Start: 2024-05-30 | End: 2024-05-30

## 2024-05-30 RX ADMIN — Medication 650 MILLIGRAM(S): at 23:36

## 2024-05-30 NOTE — ED PROVIDER NOTE - GASTROINTESTINAL, MLM
39 yo male presenting for evaluation of skin rash on his rt foot x 1 month.  As per patient the rash is improving with antifungal cream, he denies any other additional complaints.  He has a dermatology appointment  coming up later this month .  Well-appearing male in NAD, there is a 5-6 cm circular rash on the instep of the rt foot without drainage or surrounding cellulitis / abscess.  Advised to continue with cream and to keep his appointment with derm.  Strict return precautions given. Abdomen soft, non-tender, no guarding.  Large anal warts noted with punctate areas of hemorrhage also involving rectum

## 2024-05-30 NOTE — ED ADULT TRIAGE NOTE - CHIEF COMPLAINT QUOTE
Pt A&Ox4, BIBEMS form home c/o left knee pain s/p mechanical fall on pavement. Pt ambulatory with steady gait. No medication PTA.

## 2024-05-30 NOTE — ED STATDOCS - NSICDXPASTMEDICALHX_GEN_ALL_CORE_FT
PAST MEDICAL HISTORY:  Asthma     Chronic sinusitis     Closed fracture of multiple ribs of right side, initial encounter     Cocaine abuse     GBS (Guillain [place] syndrome)     HIV (human immunodeficiency virus infection) from birth    HIV disease     Homeless

## 2024-05-30 NOTE — ED PROVIDER NOTE - PATIENT PORTAL LINK FT
You can access the FollowMyHealth Patient Portal offered by [facility] by registering at the following website: [email or web address]. By joining [employer]’s FollowMyHealth portal, you will also be able to view your health information using other applications (apps) compatible with our system.

## 2024-05-30 NOTE — ED STATDOCS - NSFOLLOWUPINSTRUCTIONS_ED_ALL_ED_FT
Patient advised to take meds as prescribed and follow up with PMD/orthopedic for follow up in 3-4 days.  patient understands and agrees with plan.  Return to ER if symptoms worsens or develop new symptoms.       Acute Knee Pain, Adult  Acute knee pain is sudden and may be caused by damage, swelling, or irritation of the muscles and tissues that support the knee. Pain may result from:  A fall.  An injury to the knee from twisting motions.  A hit to the knee.  Infection.  Acute knee pain may go away on its own with time and rest. If it does not, your health care provider may order tests to find the cause of the pain. These may include:  Imaging tests, such as an X-ray, MRI, CT scan, or ultrasound.  Joint aspiration. In this test, fluid is removed from the knee and evaluated.  Arthroscopy. In this test, a lighted tube is inserted into the knee and an image is projected onto a TV screen.  Biopsy. In this test, a sample of tissue is removed from the body and studied under a microscope.  Follow these instructions at home:  If you have a knee sleeve or brace:      Wear the knee sleeve or brace as told by your health care provider. Remove it only as told by your health care provider.  Loosen it if your toes tingle, become numb, or turn cold and blue.  Keep it clean.  If the knee sleeve or brace is not waterproof:  Do not let it get wet.  Cover it with a watertight covering when you take a bath or shower.  Activity    Rest your knee.  Do not do things that cause pain or make pain worse.  Avoid high-impact activities or exercises, such as running, jumping rope, or doing jumping jacks.  Work with a physical therapist to make a safe exercise program, as recommended by your health care provider. Do exercises as told by your physical therapist.  Managing pain, stiffness, and swelling      If directed, put ice on the affected knee. To do this:  If you have a removable knee sleeve or brace, remove it as told by your health care provider.  Put ice in a plastic bag.  Place a towel between your skin and the bag.  Leave the ice on for 20 minutes, 2–3 times a day.  Remove the ice if your skin turns bright red. This is very important. If you cannot feel pain, heat, or cold, you have a greater risk of damage to the area.  If directed, use an elastic bandage to put pressure (compression) on your injured knee. This may control swelling, give support, and help with discomfort.  Raise (elevate) your knee above the level of your heart while you are sitting or lying down.  Sleep with a pillow under your knee.  General instructions    Take over-the-counter and prescription medicines only as told by your health care provider.  Do not use any products that contain nicotine or tobacco, such as cigarettes, e-cigarettes, and chewing tobacco. If you need help quitting, ask your health care provider.  If you are overweight, work with your health care provider and a dietitian to set a weight-loss goal that is healthy and reasonable for you. Extra weight can put pressure on your knee.  Pay attention to any changes in your symptoms.  Keep all follow-up visits. This is important.  Contact a health care provider if:  Your knee pain continues, changes, or gets worse.  You have a fever along with knee pain.  Your knee feels warm to the touch or is red.  Your knee galindo or locks up.  Get help right away if:  Your knee swells, and the swelling becomes worse.  You cannot move your knee.  You have severe pain in your knee that cannot be managed with pain medicine.  Summary  Acute knee pain can be caused by a fall, an injury, an infection, or damage, swelling, or irritation of the tissues that support your knee.  Your health care provider may perform tests to find out the cause of the pain.  Pay attention to any changes in your symptoms. Relieve your pain with rest, medicines, light activity, and the use of ice.  Get help right away if your knee swells, you cannot move your knee, or you have severe pain that cannot be managed with medicine.  This information is not intended to replace advice given to you by your health care provider. Make sure you discuss any questions you have with your health care provider.

## 2024-05-30 NOTE — ED STATDOCS - CARE PROVIDER_API CALL
Elbert Dorman  Orthopaedic Surgery  [address] [place] [id number]  Phone: [phone]  Fax: [phone]  Follow Up Time:

## 2024-05-30 NOTE — ED ADULT NURSE REASSESSMENT NOTE - NS ED NURSE REASSESS COMMENT FT1
Pt resting on stretcher. Safety maintained. Updates given. Per MD orders, pt was given hygeine supplies to clean self. Pt was able to clean self w minimal assistance from staff. Pt pending s/w consult in AM. Will continue to monitor. Pt resting on stretcher. Safety maintained. Updates given. Per MD orders, pt was given hygeine supplies to clean self. Pt was able to clean self w minimal assistance from staff. Pt requested to have door to room closed for rest and privacy, instructed pt to pull call bell for any assistance, pt within view of RN. Pt pending s/w consult in AM. Will continue to monitor. Pt resting on stretcher. Safety maintained. Updates given. Per MD orders, pt was given hygeine supplies to clean self. Pt was able to clean self w minimal assistance from staff. Pt requested to have door to room closed for rest and privacy, instructed pt to pull call bell for any assistance, pt within view of RN. Pt pending s/w consult in AM as pt requested assistance w shelter placement. Will continue to monitor.

## 2024-05-30 NOTE — ED STATDOCS - CLINICAL SUMMARY MEDICAL DECISION MAKING FREE TEXT BOX
34 male here s/p fall c/o left knee pain. Will r/o fracture though low suspicion, patient able to bear weight. Plan xray, pain control, reassess. 34 male here s/p fall c/o left knee pain. possible contusion. Will r/o fracture though low suspicion, patient able to bear weight. Plan xray, pain control, reassess.

## 2024-05-30 NOTE — ED STATDOCS - NS_ ATTENDINGSCRIBEDETAILS _ED_A_ED_FT
I, Ame Corona DO,  performed the initial face to face bedside interview with this patient regarding history of present illness, review of symptoms and relevant past medical, social and family history.  I completed an independent physical examination.  I was the initial provider who evaluated this patient.   I personally saw the patient and performed a substantive portion of the visit including all aspects of the medical decision making.  The history, relevant review of systems, past medical and surgical history, medical decision making, and physical examination was documented by the scribe in my presence and I attest to the accuracy of the documentation.

## 2024-05-30 NOTE — ED ADULT NURSE REASSESSMENT NOTE - NS ED NURSE REASSESS COMMENT FT1
Pt resting on stretcher. Safety maintained. Updates given. Pt pending SW consult. Will continue to monitor.

## 2024-05-30 NOTE — ED PROVIDER NOTE - NSFOLLOWUPINSTRUCTIONS_ED_ALL_ED_FT
1) Take prescription medication as instructed  2) Follow-up with your infectious disease doctor  3) Follow up with your primary care doctor  4) Follow-up with dermatology  5) Return to the ER for worsening or concerning symptoms

## 2024-05-30 NOTE — ED PROVIDER NOTE - OBJECTIVE STATEMENT
This patient is a 34 year old man hx of HIV who presents to the ER c/o recal bleeding x 3 days.  Patient was discharged yesterday from [facility] for weakness.  As per EMR attempts were made to get patient placed as he is non-domiciled but he did not want to wait for assistance.  Although triage note reports abdominal pain the patient currently denies he states that he is hungry and needs to eat.  He is also asking for helo with placement to shelter. This patient is a 34 year old man hx of HIV and Guillain [place] who presents to the ER c/o rectal bleeding x 3 days.  Patient was discharged yesterday from [facility] for weakness.  As per EMR attempts were made to get patient placed as he is non-domiciled but he did not want to wait for assistance.  Although triage note reports abdominal pain the patient currently denies he states that he is hungry and needs to eat.  He is also asking for helo with placement to shelter.

## 2024-05-30 NOTE — ED ADULT NURSE NOTE - NSICDXPASTMEDICALHX_GEN_ALL_CORE_FT
PAST MEDICAL HISTORY:  Asthma     Chronic sinusitis     Closed fracture of multiple ribs of right side, initial encounter     Cocaine abuse     GBS (Guillain [place] syndrome)     HIV (human immunodeficiency virus infection) from birth    HIV disease     Homeless

## 2024-05-30 NOTE — ED STATDOCS - CARE PROVIDERS DIRECT ADDRESSES
[email or web address]

## 2024-05-30 NOTE — ED STATDOCS - OBJECTIVE STATEMENT
35 y/o male with PMHx of asthma, GBS, HIV presents to the ED c/o left knee pain/injury. Patient states he was walking on the sidewalk, tripped and fell onto left knee at 5PM. Able to bend knee. Able to ambulate but with pain. Denies head strike, any other pain. No pain medications taken PTA. Allergy to Toradol.

## 2024-05-30 NOTE — ED ADULT NURSE REASSESSMENT NOTE - NS ED NURSE REASSESS COMMENT FT1
Pt resting on stretcher. Safety maintained. Updates given. Pt asleep, NAD. Bladder and bowel elimination facilitated. No orders at this time. Will continue to monitor.

## 2024-05-30 NOTE — ED STATDOCS - PROGRESS NOTE DETAILS
Pt states that symptoms have improved. Pt requesting to go home.    pt. walking with his regular gait. states he walks with limp due to his guillian [place].     Work-up negative for acute pathology in ED.    Signs, symptoms, results were reviewed with patient.  Pt agreed to return if symptoms return and/or worsen.  Pt agrees to follow up with PMD/ortho. Red Flags discussed with patient. Patient understands to follow-up at their scheduled appointment and take medications as prescribed. All this was discussed in layman's terms with the patient.

## 2024-05-30 NOTE — ED PROVIDER NOTE - CLINICAL SUMMARY MEDICAL DECISION MAKING FREE TEXT BOX
Patient with reports of rectal bleeding x 3 days discharged from hospital yesterday as inpatient.  No anemia on recent lab results.  Patient in no acute distress vitals stable.  Anal warts noted likely cause of patient bleeding.  Medication prescribed and given.  Follow-up discussed.  Patient will also like to see social work for help with shelter placement.

## 2024-05-30 NOTE — ED STATDOCS - PHYSICAL EXAMINATION
General: Patient in no acute distress, AAOX3.   HENMT: NC/AT, no nasal congestion, MMM  Neck: supple  CVS: regular rate and rhythm, no murmur  Resp: Good air entry bilaterally, No wheeze/rhonchi.  Abd: Soft non tender, non distended, +bowel sounds, No guarding, rebound tenderness   Ext: FROM in all ext, 2+ pulses throughout, cap refill<2 sec. mild TTP anterior knee, no bruising or erythema or edema, good ROM knee   BACK: no midline tenderness, no stepoffs, no CVA ttp  NEURO: no focal deficit, gross motor and sensory intact throughout, gait stable.

## 2024-05-30 NOTE — ED ADULT NURSE REASSESSMENT NOTE - NS ED NURSE REASSESS COMMENT FT1
Pt resting on stretcher. Safety maintained. Updates given. Universal fall precautions in place, side rails x2 raised,  socks provided, personal belongings within reach, oriented to call bell system. Bladder and bowel elimination facilitated. Meal and PO fluids offered and provided, tolerated. Pt appears comfortable. Will continue to monitor. Pt resting on stretcher. Safety maintained. Updates given. Universal fall precautions in place, side rails x2 raised,  socks provided, personal belongings within reach, oriented to call bell system. Bladder and bowel elimination facilitated. Meal and PO fluids offered and provided, tolerated. Pt appears comfortable, playing with cellphone. Will continue to monitor.

## 2024-05-30 NOTE — ED STATDOCS - PATIENT PORTAL LINK FT
You can access the FollowMyHealth Patient Portal offered by [facility] by registering at the following website: [email or web address]. By joining [employer]’s FollowMyHealth portal, you will also be able to view your health information using other applications (apps) compatible with our system.

## 2024-05-30 NOTE — CHART NOTE - NSCHARTNOTEFT_GEN_A_CORE
Pt seen in the ER secondary to pt request.  Pt relays he is homeless. Pt had been assigned to a shelter in [facility] but he has no been there in over a month according to the pt. Pt has had several hospital and ER visits within the [facility] as per chart review. Pt states when he left [facility] 2 days ago he planned to stay with a friend but the friend "dropped him off at this hospital instead".  Pt requesting to go to [facility] at [address] [place] [address]. A medicaid taxi is being arranged.

## 2024-06-05 PROBLEM — I63.9 CEREBRAL INFARCTION, UNSPECIFIED: Chronic | Status: INACTIVE | Noted: 2020-10-05 | Resolved: 2022-03-01

## 2024-06-06 DIAGNOSIS — E83.39 OTHER DISORDERS OF PHOSPHORUS METABOLISM: ICD-10-CM

## 2024-06-06 DIAGNOSIS — E83.42 HYPOMAGNESEMIA: ICD-10-CM

## 2024-06-06 DIAGNOSIS — B20 HUMAN IMMUNODEFICIENCY VIRUS [HIV] DISEASE: ICD-10-CM

## 2024-06-06 DIAGNOSIS — J45.909 UNSPECIFIED ASTHMA, UNCOMPLICATED: ICD-10-CM

## 2024-06-06 DIAGNOSIS — G61.0 GUILLAIN-BARRE SYNDROME: ICD-10-CM

## 2024-06-06 DIAGNOSIS — E43 UNSPECIFIED SEVERE PROTEIN-CALORIE MALNUTRITION: ICD-10-CM

## 2024-06-06 DIAGNOSIS — Z86.73 PERSONAL HISTORY OF TRANSIENT ISCHEMIC ATTACK (TIA), AND CEREBRAL INFARCTION WITHOUT RESIDUAL DEFICITS: ICD-10-CM

## 2024-06-06 DIAGNOSIS — Z59.00 HOMELESSNESS UNSPECIFIED: ICD-10-CM

## 2024-06-06 DIAGNOSIS — Z79.899 OTHER LONG TERM (CURRENT) DRUG THERAPY: ICD-10-CM

## 2024-06-06 DIAGNOSIS — Z98.890 OTHER SPECIFIED POSTPROCEDURAL STATES: ICD-10-CM

## 2024-06-06 DIAGNOSIS — Z53.29 PROCEDURE AND TREATMENT NOT CARRIED OUT BECAUSE OF PATIENT'S DECISION FOR OTHER REASONS: ICD-10-CM

## 2024-06-06 DIAGNOSIS — J32.9 CHRONIC SINUSITIS, UNSPECIFIED: ICD-10-CM

## 2024-06-06 DIAGNOSIS — M51.27 OTHER INTERVERTEBRAL DISC DISPLACEMENT, LUMBOSACRAL REGION: ICD-10-CM

## 2024-06-06 DIAGNOSIS — R62.7 ADULT FAILURE TO THRIVE: ICD-10-CM

## 2024-06-06 DIAGNOSIS — Z88.8 ALLERGY STATUS TO OTHER DRUGS, MEDICAMENTS AND BIOLOGICAL SUBSTANCES: ICD-10-CM

## 2024-06-06 SDOH — ECONOMIC STABILITY - HOUSING INSECURITY: HOMELESSNESS UNSPECIFIED: Z59.00

## 2024-06-06 NOTE — PROCEDURE NOTE - NSTIMEOUT_GEN_A_CORE
Patient's first and last name, , procedure, and correct site confirmed prior to the start of procedure. 20

## 2024-06-11 NOTE — DISCHARGE NOTE PROVIDER - DISCHARGE DIET
Sunscreen Recommendations: ROSA sunscreen
Detail Level: Generalized
Detail Level: Zone
Regular Diet - No restrictions

## 2024-06-11 NOTE — ED ADULT NURSE NOTE - DISCHARGE DATE/TIME
----- Message from Toshia Martinez MD sent at 6/11/2024  9:07 AM CDT -----  Please call the patient with the attached results. Iron deficiency anemia. initiate oral iron supplementation with ferrous sulfate. Instruct the patient to take iron supplements on an empty stomach or with vitamin C to enhance absorption, and to avoid taking them with calcium-containing foods or medications.  Monitor for gastrointestinal side effects such as constipation, nausea, or abdominal discomfort.  Educate the patient about iron-rich foods such as red meat, poultry, fish, beans, lentils, tofu, fortified cereals, and leafy green vegetables.  Encourage consumption of vitamin C-rich foods to enhance iron absorption, such as citrus fruits, strawberries, bell peppers, and tomatoes.    25-Jul-2023 04:00

## 2024-07-03 ENCOUNTER — TRANSCRIPTION ENCOUNTER (OUTPATIENT)
Age: 34
End: 2024-07-03

## 2024-07-05 ENCOUNTER — EMERGENCY (EMERGENCY)
Facility: HOSPITAL | Age: 34
LOS: 1 days | Discharge: HOME | End: 2024-07-05
Attending: STUDENT IN AN ORGANIZED HEALTH CARE EDUCATION/TRAINING PROGRAM
Payer: COMMERCIAL

## 2024-07-05 VITALS
RESPIRATION RATE: 18 BRPM | HEART RATE: 77 BPM | DIASTOLIC BLOOD PRESSURE: 84 MMHG | TEMPERATURE: 99 F | SYSTOLIC BLOOD PRESSURE: 136 MMHG | OXYGEN SATURATION: 97 % | HEIGHT: 72 IN | WEIGHT: 220.02 LBS

## 2024-07-05 DIAGNOSIS — Z98.890 OTHER SPECIFIED POSTPROCEDURAL STATES: Chronic | ICD-10-CM

## 2024-07-05 LAB
ALBUMIN SERPL ELPH-MCNC: 4.3 G/DL — SIGNIFICANT CHANGE UP (ref 3.3–5.2)
ALP SERPL-CCNC: 72 U/L — SIGNIFICANT CHANGE UP (ref 40–120)
ALT FLD-CCNC: 16 U/L — SIGNIFICANT CHANGE UP
ANION GAP SERPL CALC-SCNC: 15 MMOL/L — SIGNIFICANT CHANGE UP (ref 5–17)
AST SERPL-CCNC: 31 U/L — SIGNIFICANT CHANGE UP
BASOPHILS # BLD AUTO: 0.02 K/UL — SIGNIFICANT CHANGE UP (ref 0–0.2)
BASOPHILS NFR BLD AUTO: 0.4 % — SIGNIFICANT CHANGE UP (ref 0–2)
BILIRUB SERPL-MCNC: 0.7 MG/DL — SIGNIFICANT CHANGE UP (ref 0.4–2)
BUN SERPL-MCNC: 17.5 MG/DL — SIGNIFICANT CHANGE UP (ref 8–20)
CALCIUM SERPL-MCNC: 9.4 MG/DL — SIGNIFICANT CHANGE UP (ref 8.4–10.5)
CHLORIDE SERPL-SCNC: 100 MMOL/L — SIGNIFICANT CHANGE UP (ref 96–108)
CO2 SERPL-SCNC: 22 MMOL/L — SIGNIFICANT CHANGE UP (ref 22–29)
CREAT SERPL-MCNC: 0.72 MG/DL — SIGNIFICANT CHANGE UP (ref 0.5–1.3)
EGFR: 122 ML/MIN/1.73M2 — SIGNIFICANT CHANGE UP
EOSINOPHIL # BLD AUTO: 0.01 K/UL — SIGNIFICANT CHANGE UP (ref 0–0.5)
EOSINOPHIL NFR BLD AUTO: 0.2 % — SIGNIFICANT CHANGE UP (ref 0–6)
GLUCOSE SERPL-MCNC: 76 MG/DL — SIGNIFICANT CHANGE UP (ref 70–99)
HCT VFR BLD CALC: 37.4 % — LOW (ref 39–50)
HGB BLD-MCNC: 12.6 G/DL — LOW (ref 13–17)
IMM GRANULOCYTES NFR BLD AUTO: 0.2 % — SIGNIFICANT CHANGE UP (ref 0–0.9)
LYMPHOCYTES # BLD AUTO: 2.56 K/UL — SIGNIFICANT CHANGE UP (ref 1–3.3)
LYMPHOCYTES # BLD AUTO: 46.3 % — HIGH (ref 13–44)
MCHC RBC-ENTMCNC: 29.2 PG — SIGNIFICANT CHANGE UP (ref 27–34)
MCHC RBC-ENTMCNC: 33.7 GM/DL — SIGNIFICANT CHANGE UP (ref 32–36)
MCV RBC AUTO: 86.6 FL — SIGNIFICANT CHANGE UP (ref 80–100)
MONOCYTES # BLD AUTO: 0.76 K/UL — SIGNIFICANT CHANGE UP (ref 0–0.9)
MONOCYTES NFR BLD AUTO: 13.7 % — SIGNIFICANT CHANGE UP (ref 2–14)
NEUTROPHILS # BLD AUTO: 2.17 K/UL — SIGNIFICANT CHANGE UP (ref 1.8–7.4)
NEUTROPHILS NFR BLD AUTO: 39.2 % — LOW (ref 43–77)
PLATELET # BLD AUTO: 251 K/UL — SIGNIFICANT CHANGE UP (ref 150–400)
POTASSIUM SERPL-MCNC: 3.3 MMOL/L — LOW (ref 3.5–5.3)
POTASSIUM SERPL-SCNC: 3.3 MMOL/L — LOW (ref 3.5–5.3)
PROT SERPL-MCNC: 7.9 G/DL — SIGNIFICANT CHANGE UP (ref 6.6–8.7)
RBC # BLD: 4.32 M/UL — SIGNIFICANT CHANGE UP (ref 4.2–5.8)
RBC # FLD: 12.1 % — SIGNIFICANT CHANGE UP (ref 10.3–14.5)
SODIUM SERPL-SCNC: 137 MMOL/L — SIGNIFICANT CHANGE UP (ref 135–145)
TSH SERPL-MCNC: 0.5 UIU/ML — SIGNIFICANT CHANGE UP (ref 0.27–4.2)
WBC # BLD: 5.53 K/UL — SIGNIFICANT CHANGE UP (ref 3.8–10.5)
WBC # FLD AUTO: 5.53 K/UL — SIGNIFICANT CHANGE UP (ref 3.8–10.5)

## 2024-07-05 PROCEDURE — 99284 EMERGENCY DEPT VISIT MOD MDM: CPT

## 2024-07-05 PROCEDURE — 99283 EMERGENCY DEPT VISIT LOW MDM: CPT

## 2024-07-05 PROCEDURE — 80053 COMPREHEN METABOLIC PANEL: CPT

## 2024-07-05 PROCEDURE — 85025 COMPLETE CBC W/AUTO DIFF WBC: CPT

## 2024-07-05 PROCEDURE — 84443 ASSAY THYROID STIM HORMONE: CPT

## 2024-07-05 PROCEDURE — 36415 COLL VENOUS BLD VENIPUNCTURE: CPT

## 2024-07-05 PROCEDURE — 82962 GLUCOSE BLOOD TEST: CPT

## 2024-07-05 RX ORDER — SODIUM CHLORIDE 0.9 % (FLUSH) 0.9 %
1000 SYRINGE (ML) INJECTION ONCE
Refills: 0 | Status: COMPLETED | OUTPATIENT
Start: 2024-07-05 | End: 2024-07-05

## 2024-07-05 RX ADMIN — Medication 1000 MILLILITER(S): at 10:15

## 2024-07-06 ENCOUNTER — EMERGENCY (EMERGENCY)
Facility: HOSPITAL | Age: 34
LOS: 1 days | Discharge: HOME | End: 2024-07-06
Attending: STUDENT IN AN ORGANIZED HEALTH CARE EDUCATION/TRAINING PROGRAM
Payer: COMMERCIAL

## 2024-07-06 VITALS
SYSTOLIC BLOOD PRESSURE: 134 MMHG | OXYGEN SATURATION: 97 % | TEMPERATURE: 98 F | RESPIRATION RATE: 18 BRPM | HEART RATE: 66 BPM | DIASTOLIC BLOOD PRESSURE: 76 MMHG

## 2024-07-06 VITALS
OXYGEN SATURATION: 95 % | WEIGHT: 184.97 LBS | DIASTOLIC BLOOD PRESSURE: 88 MMHG | RESPIRATION RATE: 20 BRPM | HEIGHT: 72 IN | SYSTOLIC BLOOD PRESSURE: 137 MMHG | TEMPERATURE: 99 F | HEART RATE: 72 BPM

## 2024-07-06 DIAGNOSIS — Z98.890 OTHER SPECIFIED POSTPROCEDURAL STATES: Chronic | ICD-10-CM

## 2024-07-06 LAB
ALBUMIN SERPL ELPH-MCNC: 4.3 G/DL — SIGNIFICANT CHANGE UP (ref 3.3–5.2)
ALP SERPL-CCNC: 72 U/L — SIGNIFICANT CHANGE UP (ref 40–120)
ALT FLD-CCNC: 15 U/L — SIGNIFICANT CHANGE UP
ANION GAP SERPL CALC-SCNC: 18 MMOL/L — HIGH (ref 5–17)
AST SERPL-CCNC: 30 U/L — SIGNIFICANT CHANGE UP
BASOPHILS # BLD AUTO: 0.01 K/UL — SIGNIFICANT CHANGE UP (ref 0–0.2)
BASOPHILS NFR BLD AUTO: 0.2 % — SIGNIFICANT CHANGE UP (ref 0–2)
BILIRUB SERPL-MCNC: 0.6 MG/DL — SIGNIFICANT CHANGE UP (ref 0.4–2)
BUN SERPL-MCNC: 10.5 MG/DL — SIGNIFICANT CHANGE UP (ref 8–20)
CALCIUM SERPL-MCNC: 8.8 MG/DL — SIGNIFICANT CHANGE UP (ref 8.4–10.5)
CHLORIDE SERPL-SCNC: 100 MMOL/L — SIGNIFICANT CHANGE UP (ref 96–108)
CO2 SERPL-SCNC: 21 MMOL/L — LOW (ref 22–29)
CREAT SERPL-MCNC: 0.69 MG/DL — SIGNIFICANT CHANGE UP (ref 0.5–1.3)
EGFR: 124 ML/MIN/1.73M2 — SIGNIFICANT CHANGE UP
EOSINOPHIL # BLD AUTO: 0.01 K/UL — SIGNIFICANT CHANGE UP (ref 0–0.5)
EOSINOPHIL NFR BLD AUTO: 0.2 % — SIGNIFICANT CHANGE UP (ref 0–6)
GLUCOSE SERPL-MCNC: 90 MG/DL — SIGNIFICANT CHANGE UP (ref 70–99)
HCT VFR BLD CALC: 36.3 % — LOW (ref 39–50)
HGB BLD-MCNC: 12.2 G/DL — LOW (ref 13–17)
IMM GRANULOCYTES NFR BLD AUTO: 0.2 % — SIGNIFICANT CHANGE UP (ref 0–0.9)
LIDOCAIN IGE QN: 23 U/L — SIGNIFICANT CHANGE UP (ref 22–51)
LYMPHOCYTES # BLD AUTO: 2.22 K/UL — SIGNIFICANT CHANGE UP (ref 1–3.3)
LYMPHOCYTES # BLD AUTO: 39.4 % — SIGNIFICANT CHANGE UP (ref 13–44)
MCHC RBC-ENTMCNC: 29.4 PG — SIGNIFICANT CHANGE UP (ref 27–34)
MCHC RBC-ENTMCNC: 33.6 GM/DL — SIGNIFICANT CHANGE UP (ref 32–36)
MCV RBC AUTO: 87.5 FL — SIGNIFICANT CHANGE UP (ref 80–100)
MONOCYTES # BLD AUTO: 0.71 K/UL — SIGNIFICANT CHANGE UP (ref 0–0.9)
MONOCYTES NFR BLD AUTO: 12.6 % — SIGNIFICANT CHANGE UP (ref 2–14)
NEUTROPHILS # BLD AUTO: 2.68 K/UL — SIGNIFICANT CHANGE UP (ref 1.8–7.4)
NEUTROPHILS NFR BLD AUTO: 47.4 % — SIGNIFICANT CHANGE UP (ref 43–77)
PLATELET # BLD AUTO: 237 K/UL — SIGNIFICANT CHANGE UP (ref 150–400)
POTASSIUM SERPL-MCNC: 3.5 MMOL/L — SIGNIFICANT CHANGE UP (ref 3.5–5.3)
POTASSIUM SERPL-SCNC: 3.5 MMOL/L — SIGNIFICANT CHANGE UP (ref 3.5–5.3)
PROT SERPL-MCNC: 7.4 G/DL — SIGNIFICANT CHANGE UP (ref 6.6–8.7)
RBC # BLD: 4.15 M/UL — LOW (ref 4.2–5.8)
RBC # FLD: 12.2 % — SIGNIFICANT CHANGE UP (ref 10.3–14.5)
SODIUM SERPL-SCNC: 139 MMOL/L — SIGNIFICANT CHANGE UP (ref 135–145)
TROPONIN T, HIGH SENSITIVITY RESULT: <6 NG/L — SIGNIFICANT CHANGE UP (ref 0–51)
WBC # BLD: 5.64 K/UL — SIGNIFICANT CHANGE UP (ref 3.8–10.5)
WBC # FLD AUTO: 5.64 K/UL — SIGNIFICANT CHANGE UP (ref 3.8–10.5)

## 2024-07-06 PROCEDURE — 99285 EMERGENCY DEPT VISIT HI MDM: CPT

## 2024-07-06 PROCEDURE — 80053 COMPREHEN METABOLIC PANEL: CPT

## 2024-07-06 PROCEDURE — 96374 THER/PROPH/DIAG INJ IV PUSH: CPT

## 2024-07-06 PROCEDURE — 71046 X-RAY EXAM CHEST 2 VIEWS: CPT | Mod: 26

## 2024-07-06 PROCEDURE — 99285 EMERGENCY DEPT VISIT HI MDM: CPT | Mod: 25

## 2024-07-06 PROCEDURE — 93010 ELECTROCARDIOGRAM REPORT: CPT

## 2024-07-06 PROCEDURE — 85025 COMPLETE CBC W/AUTO DIFF WBC: CPT

## 2024-07-06 PROCEDURE — 93005 ELECTROCARDIOGRAM TRACING: CPT

## 2024-07-06 PROCEDURE — 84484 ASSAY OF TROPONIN QUANT: CPT

## 2024-07-06 PROCEDURE — 71046 X-RAY EXAM CHEST 2 VIEWS: CPT

## 2024-07-06 PROCEDURE — 83690 ASSAY OF LIPASE: CPT

## 2024-07-06 PROCEDURE — 36415 COLL VENOUS BLD VENIPUNCTURE: CPT

## 2024-07-06 RX ORDER — ACETAMINOPHEN 325 MG
1000 TABLET ORAL ONCE
Refills: 0 | Status: COMPLETED | OUTPATIENT
Start: 2024-07-06 | End: 2024-07-06

## 2024-07-06 RX ORDER — DEXTROSE MONOHYDRATE AND SODIUM CHLORIDE 5; .3 G/100ML; G/100ML
1000 INJECTION, SOLUTION INTRAVENOUS ONCE
Refills: 0 | Status: COMPLETED | OUTPATIENT
Start: 2024-07-06 | End: 2024-07-06

## 2024-07-06 RX ADMIN — Medication 400 MILLIGRAM(S): at 11:06

## 2024-07-06 RX ADMIN — DEXTROSE MONOHYDRATE AND SODIUM CHLORIDE 1000 MILLILITER(S): 5; .3 INJECTION, SOLUTION INTRAVENOUS at 11:53

## 2024-07-15 NOTE — ED PROVIDER NOTE - TOBACCO USE
Specialty Pharmacy Refill Coordination Note     Roberto is a 18 y.o. female contacted today regarding refills of  [employer] specialty medication(s).    Reviewed and verified with patient:       Specialty medication(s) and dose(s) confirmed: yes    Refill Questions      Flowsheet Row Most Recent Value   Changes to allergies? No   Changes to medications? No   New conditions or infections since last clinic visit No   Unplanned office visit, urgent care, ED, or hospital admission in the last 4 weeks  No   How does patient/caregiver feel medication is working? Very good   Financial problems or insurance changes  No   Since the previous refill, were any specialty medication doses or scheduled injections missed or delayed?  No   Does this patient require a clinical escalation to a pharmacist? No            Delivery Questions      Flowsheet Row Most Recent Value   Delivery method Beeline   Delivery address verified with patient/caregiver? Yes   Delivery address Home   Number of medications in delivery 1   Medication(s) being filled and delivered Erenumab-Aooe   Doses left of specialty medications 0   Copay verified? Yes   Copay amount $5   Copay form of payment Credit/debit on file   Ship Date 7/16   Delivery Date 7/17   Signature Required No                   Follow-up: 21 day(s)     Marcy Ba, Pharmacy Technician  Specialty Pharmacy Technician        
Unknown if ever smoked

## 2024-07-18 ENCOUNTER — EMERGENCY (EMERGENCY)
Facility: HOSPITAL | Age: 34
LOS: 1 days | Discharge: HOME | End: 2024-07-18
Attending: STUDENT IN AN ORGANIZED HEALTH CARE EDUCATION/TRAINING PROGRAM
Payer: COMMERCIAL

## 2024-07-18 VITALS
RESPIRATION RATE: 18 BRPM | HEART RATE: 110 BPM | SYSTOLIC BLOOD PRESSURE: 151 MMHG | WEIGHT: 205.03 LBS | HEIGHT: 72 IN | TEMPERATURE: 99 F | OXYGEN SATURATION: 99 % | DIASTOLIC BLOOD PRESSURE: 99 MMHG

## 2024-07-18 VITALS
OXYGEN SATURATION: 98 % | TEMPERATURE: 99 F | SYSTOLIC BLOOD PRESSURE: 123 MMHG | DIASTOLIC BLOOD PRESSURE: 71 MMHG | HEART RATE: 90 BPM | RESPIRATION RATE: 18 BRPM

## 2024-07-18 DIAGNOSIS — Z98.890 OTHER SPECIFIED POSTPROCEDURAL STATES: Chronic | ICD-10-CM

## 2024-07-18 LAB
ALBUMIN SERPL ELPH-MCNC: 3.7 G/DL — SIGNIFICANT CHANGE UP (ref 3.3–5.2)
ALP SERPL-CCNC: 68 U/L — SIGNIFICANT CHANGE UP (ref 40–120)
ALT FLD-CCNC: 11 U/L — SIGNIFICANT CHANGE UP
ANION GAP SERPL CALC-SCNC: 17 MMOL/L — SIGNIFICANT CHANGE UP (ref 5–17)
APPEARANCE UR: CLEAR — SIGNIFICANT CHANGE UP
APTT BLD: 31.4 SEC — SIGNIFICANT CHANGE UP (ref 24.5–35.6)
AST SERPL-CCNC: 20 U/L — SIGNIFICANT CHANGE UP
BACTERIA # UR AUTO: NEGATIVE /HPF — SIGNIFICANT CHANGE UP
BASOPHILS # BLD AUTO: 0.01 K/UL — SIGNIFICANT CHANGE UP (ref 0–0.2)
BASOPHILS NFR BLD AUTO: 0.2 % — SIGNIFICANT CHANGE UP (ref 0–2)
BILIRUB SERPL-MCNC: 1.1 MG/DL — SIGNIFICANT CHANGE UP (ref 0.4–2)
BILIRUB UR-MCNC: NEGATIVE — SIGNIFICANT CHANGE UP
BUN SERPL-MCNC: 5.8 MG/DL — LOW (ref 8–20)
CALCIUM SERPL-MCNC: 8.1 MG/DL — LOW (ref 8.4–10.5)
CAST: 0 /LPF — SIGNIFICANT CHANGE UP (ref 0–4)
CHLORIDE SERPL-SCNC: 95 MMOL/L — LOW (ref 96–108)
CO2 SERPL-SCNC: 23 MMOL/L — SIGNIFICANT CHANGE UP (ref 22–29)
COLOR SPEC: YELLOW — SIGNIFICANT CHANGE UP
CREAT SERPL-MCNC: 0.71 MG/DL — SIGNIFICANT CHANGE UP (ref 0.5–1.3)
DIFF PNL FLD: NEGATIVE — SIGNIFICANT CHANGE UP
EGFR: 123 ML/MIN/1.73M2 — SIGNIFICANT CHANGE UP
EOSINOPHIL # BLD AUTO: 0.02 K/UL — SIGNIFICANT CHANGE UP (ref 0–0.5)
EOSINOPHIL NFR BLD AUTO: 0.4 % — SIGNIFICANT CHANGE UP (ref 0–6)
FLUAV AG NPH QL: SIGNIFICANT CHANGE UP
FLUBV AG NPH QL: SIGNIFICANT CHANGE UP
GLUCOSE SERPL-MCNC: 94 MG/DL — SIGNIFICANT CHANGE UP (ref 70–99)
GLUCOSE UR QL: NEGATIVE MG/DL — SIGNIFICANT CHANGE UP
HCT VFR BLD CALC: 33.1 % — LOW (ref 39–50)
HGB BLD-MCNC: 11.2 G/DL — LOW (ref 13–17)
IMM GRANULOCYTES NFR BLD AUTO: 0.2 % — SIGNIFICANT CHANGE UP (ref 0–0.9)
INR BLD: 1.44 RATIO — HIGH (ref 0.85–1.18)
KETONES UR-MCNC: 15 MG/DL
LACTATE BLDV-MCNC: 1 MMOL/L — SIGNIFICANT CHANGE UP (ref 0.5–2)
LEUKOCYTE ESTERASE UR-ACNC: NEGATIVE — SIGNIFICANT CHANGE UP
LYMPHOCYTES # BLD AUTO: 2.14 K/UL — SIGNIFICANT CHANGE UP (ref 1–3.3)
LYMPHOCYTES # BLD AUTO: 38.2 % — SIGNIFICANT CHANGE UP (ref 13–44)
MCHC RBC-ENTMCNC: 29.2 PG — SIGNIFICANT CHANGE UP (ref 27–34)
MCHC RBC-ENTMCNC: 33.8 GM/DL — SIGNIFICANT CHANGE UP (ref 32–36)
MCV RBC AUTO: 86.4 FL — SIGNIFICANT CHANGE UP (ref 80–100)
MONOCYTES # BLD AUTO: 0.53 K/UL — SIGNIFICANT CHANGE UP (ref 0–0.9)
MONOCYTES NFR BLD AUTO: 9.5 % — SIGNIFICANT CHANGE UP (ref 2–14)
NEUTROPHILS # BLD AUTO: 2.89 K/UL — SIGNIFICANT CHANGE UP (ref 1.8–7.4)
NEUTROPHILS NFR BLD AUTO: 51.5 % — SIGNIFICANT CHANGE UP (ref 43–77)
NITRITE UR-MCNC: NEGATIVE — SIGNIFICANT CHANGE UP
PH UR: 7.5 — SIGNIFICANT CHANGE UP (ref 5–8)
PLATELET # BLD AUTO: 187 K/UL — SIGNIFICANT CHANGE UP (ref 150–400)
POTASSIUM SERPL-MCNC: 3 MMOL/L — LOW (ref 3.5–5.3)
POTASSIUM SERPL-SCNC: 3 MMOL/L — LOW (ref 3.5–5.3)
PROT SERPL-MCNC: 7 G/DL — SIGNIFICANT CHANGE UP (ref 6.6–8.7)
PROT UR-MCNC: 30 MG/DL
PROTHROM AB SERPL-ACNC: 15.8 SEC — HIGH (ref 9.5–13)
RBC # BLD: 3.83 M/UL — LOW (ref 4.2–5.8)
RBC # FLD: 12.9 % — SIGNIFICANT CHANGE UP (ref 10.3–14.5)
RBC CASTS # UR COMP ASSIST: 1 /HPF — SIGNIFICANT CHANGE UP (ref 0–4)
RSV RNA NPH QL NAA+NON-PROBE: SIGNIFICANT CHANGE UP
SARS-COV-2 RNA SPEC QL NAA+PROBE: SIGNIFICANT CHANGE UP
SODIUM SERPL-SCNC: 134 MMOL/L — LOW (ref 135–145)
SP GR SPEC: 1.02 — SIGNIFICANT CHANGE UP (ref 1–1.03)
SQUAMOUS # UR AUTO: 0 /HPF — SIGNIFICANT CHANGE UP (ref 0–5)
UROBILINOGEN FLD QL: 2 MG/DL (ref 0.2–1)
WBC # BLD: 5.6 K/UL — SIGNIFICANT CHANGE UP (ref 3.8–10.5)
WBC # FLD AUTO: 5.6 K/UL — SIGNIFICANT CHANGE UP (ref 3.8–10.5)
WBC UR QL: 0 /HPF — SIGNIFICANT CHANGE UP (ref 0–5)

## 2024-07-18 PROCEDURE — 87086 URINE CULTURE/COLONY COUNT: CPT

## 2024-07-18 PROCEDURE — 96374 THER/PROPH/DIAG INJ IV PUSH: CPT | Mod: XU

## 2024-07-18 PROCEDURE — 71275 CT ANGIOGRAPHY CHEST: CPT | Mod: MC

## 2024-07-18 PROCEDURE — 85025 COMPLETE CBC W/AUTO DIFF WBC: CPT

## 2024-07-18 PROCEDURE — 80053 COMPREHEN METABOLIC PANEL: CPT

## 2024-07-18 PROCEDURE — 71046 X-RAY EXAM CHEST 2 VIEWS: CPT | Mod: 26

## 2024-07-18 PROCEDURE — 71046 X-RAY EXAM CHEST 2 VIEWS: CPT

## 2024-07-18 PROCEDURE — 74177 CT ABD & PELVIS W/CONTRAST: CPT | Mod: 26,MC

## 2024-07-18 PROCEDURE — 85610 PROTHROMBIN TIME: CPT

## 2024-07-18 PROCEDURE — 96375 TX/PRO/DX INJ NEW DRUG ADDON: CPT | Mod: XU

## 2024-07-18 PROCEDURE — 83605 ASSAY OF LACTIC ACID: CPT

## 2024-07-18 PROCEDURE — 85379 FIBRIN DEGRADATION QUANT: CPT

## 2024-07-18 PROCEDURE — 99285 EMERGENCY DEPT VISIT HI MDM: CPT

## 2024-07-18 PROCEDURE — 85730 THROMBOPLASTIN TIME PARTIAL: CPT

## 2024-07-18 PROCEDURE — 74177 CT ABD & PELVIS W/CONTRAST: CPT | Mod: MC

## 2024-07-18 PROCEDURE — 81001 URINALYSIS AUTO W/SCOPE: CPT

## 2024-07-18 PROCEDURE — 87040 BLOOD CULTURE FOR BACTERIA: CPT

## 2024-07-18 PROCEDURE — 87637 SARSCOV2&INF A&B&RSV AMP PRB: CPT

## 2024-07-18 PROCEDURE — 99285 EMERGENCY DEPT VISIT HI MDM: CPT | Mod: 25

## 2024-07-18 PROCEDURE — 36415 COLL VENOUS BLD VENIPUNCTURE: CPT

## 2024-07-18 PROCEDURE — 93010 ELECTROCARDIOGRAM REPORT: CPT

## 2024-07-18 PROCEDURE — 93005 ELECTROCARDIOGRAM TRACING: CPT

## 2024-07-18 PROCEDURE — 71275 CT ANGIOGRAPHY CHEST: CPT | Mod: 26,MC

## 2024-07-18 RX ORDER — CEFTRIAXONE SODIUM 500 MG
1000 VIAL (EA) INJECTION ONCE
Refills: 0 | Status: DISCONTINUED | OUTPATIENT
Start: 2024-07-18 | End: 2024-07-18

## 2024-07-18 RX ORDER — CEFTRIAXONE SODIUM 500 MG
1000 VIAL (EA) INJECTION ONCE
Refills: 0 | Status: COMPLETED | OUTPATIENT
Start: 2024-07-18 | End: 2024-07-18

## 2024-07-18 RX ORDER — AZITHROMYCIN 250 MG/1
500 TABLET, FILM COATED ORAL ONCE
Refills: 0 | Status: COMPLETED | OUTPATIENT
Start: 2024-07-18 | End: 2024-07-18

## 2024-07-18 RX ORDER — SODIUM CHLORIDE 0.9 % (FLUSH) 0.9 %
1000 SYRINGE (ML) INJECTION ONCE
Refills: 0 | Status: COMPLETED | OUTPATIENT
Start: 2024-07-18 | End: 2024-07-18

## 2024-07-18 RX ORDER — ACETAMINOPHEN 325 MG
1000 TABLET ORAL ONCE
Refills: 0 | Status: COMPLETED | OUTPATIENT
Start: 2024-07-18 | End: 2024-07-18

## 2024-07-18 RX ORDER — POTASSIUM CHLORIDE 600 MG/1
40 TABLET, FILM COATED, EXTENDED RELEASE ORAL ONCE
Refills: 0 | Status: COMPLETED | OUTPATIENT
Start: 2024-07-18 | End: 2024-07-18

## 2024-07-18 RX ADMIN — POTASSIUM CHLORIDE 40 MILLIEQUIVALENT(S): 600 TABLET, FILM COATED, EXTENDED RELEASE ORAL at 21:47

## 2024-07-18 RX ADMIN — Medication 1000 MILLIGRAM(S): at 20:01

## 2024-07-18 RX ADMIN — Medication 1000 MILLILITER(S): at 20:01

## 2024-07-18 RX ADMIN — Medication 400 MILLIGRAM(S): at 20:02

## 2024-07-18 RX ADMIN — Medication 1000 MILLIGRAM(S): at 21:39

## 2024-07-18 RX ADMIN — AZITHROMYCIN 255 MILLIGRAM(S): 250 TABLET, FILM COATED ORAL at 20:10

## 2024-07-18 NOTE — ED PROVIDER NOTE - PATIENT PORTAL LINK FT
You can access the FollowMyHealth Patient Portal offered by [facility] by registering at the following website: [email or web address]. By joining [employer]’s FollowMyHealth portal, you will also be able to view your health information using other applications (apps) compatible with our system.

## 2024-07-18 NOTE — ED PROVIDER NOTE - CLINICAL SUMMARY MEDICAL DECISION MAKING FREE TEXT BOX
34y male w/ pmh of GBS and HIV, notes medication compliance and undetectable VL, presenting with fever, body aches, fatigue, and runny nose x3 days. he has had an associated dry cough and today developed chest pain and SOB. denies abd pain, N/V/D, urinary symptoms, leg pain or leg swelling. denies sick contacts or recent travel. 34y male w/ pmh of GBS and HIV, notes medication compliance and undetectable VL, presenting with fever, body aches, fatigue, and runny nose x3 days, and constipation. patient warm to the touch on exam, RLQ tenderness. otherwise nontoxic appearing, normal work of breathing, clear lungs. suspect PNA vs viral syndrome. will send blood cultures given hx of HIV. CT abd/pelvis ordered for constipation and RLQ tenderness to r/o obstruction. azithromycin, Rocephin, and IV fluids started. 34y male w/ pmh of GBS and HIV, notes medication compliance and undetectable VL, presenting with fever, body aches, fatigue, and runny nose x3 days, and constipation. patient warm to the touch on exam, RLQ tenderness. otherwise nontoxic appearing, normal work of breathing, clear lungs. suspect PNA vs viral syndrome. will send blood cultures given hx of HIV. CT abd/pelvis ordered for constipation and RLQ tenderness to r/o obstruction. azithromycin, Rocephin, and IV fluids started.    CT shows pneumonia, no other acute pathology, labs wnl. Discussed results w pt, safe to be discharged. 34y male w/ pmh of GBS and HIV, notes medication compliance and undetectable VL, presenting with fever, body aches, fatigue, and runny nose x3 days, and constipation. patient warm to the touch on exam, RLQ tenderness. otherwise nontoxic appearing, normal work of breathing, clear lungs. suspect PNA vs viral syndrome. will send blood cultures given hx of HIV. CT abd/pelvis ordered for constipation and RLQ tenderness to eval for acute surgical pathology in abd. azithromycin, Rocephin, and IV fluids started.    CT shows pneumonia, no other acute pathology, labs wnl. Discussed results w pt, safe to be discharged.

## 2024-07-18 NOTE — ED PROVIDER NOTE - NSICDXPASTMEDICALHX_GEN_ALL_CORE_FT
PAST MEDICAL HISTORY:  Asthma     Chronic sinusitis     Closed fracture of multiple ribs of right side, initial encounter     Cocaine abuse     GBS (Guillain [place] syndrome)     HIV (human immunodeficiency virus infection) from birth    HIV disease     Homeless

## 2024-07-18 NOTE — ED PROVIDER NOTE - ATTENDING CONTRIBUTION TO CARE
34y male w/ pmh of GBS and HIV (reports undetectable viral load), presenting with fever, body aches, fatigue, and runny nose,  dry cough and today developed chest pain and SOB. patient also notes constipation, states his last BM was 2-3 weeks ago.     PHYSICAL EXAM:   General: nontoxic appearing  HEENT: NC/AT, airway patent  Cardiovascular: tachycardic rate and regular rhythm, + S1/S2, no murmurs, rubs, gallops appreciated  Respiratory: clear to auscultation bilaterally, good aeration bilaterally, nonlabored respirations  Abdominal: soft, +RLQ TTP, nondistended, no rebound, guarding or rigidity  Extremities: no LE edema or calf tenderness b/l.   Neuro: Alert and oriented x3. no lateralizing deficits  Psychiatric: appropriate mood and affect.   -Nancy Grossman MD Attending Physician     symptoms likely related to pna, patient with tachycardia, 99.3 T, expected sepsis, orderset initiated. however given tachycardia, low grade fever, chest pain/sob, xray wuithout focal consolidation, will scan chest as CTA to eval for PE. will run cntrast through abd to assess fo acute surgical pathology in abd contnributing to symptoms as patient is with rlq TTP and no BM x 3 weeks. patient isgned out to dr leigh pending labs and imaging. dispo and further interventions at their discretion

## 2024-07-18 NOTE — ED PROVIDER NOTE - OBJECTIVE STATEMENT
34y male w/ pmh of GBS and HIV, notes medication compliance and undetectable VL, presenting with fever, body aches, fatigue, and runny nose x3 days. he has had an associated dry cough and today developed chest pain and SOB. denies abd pain, N/V/D, urinary symptoms, leg pain or leg swelling. denies sick contacts or recent travel. 34y male w/ pmh of GBS and HIV, notes medication compliance and undetectable VL, presenting with fever, body aches, fatigue, and runny nose x3 days. he has had an associated dry cough and today developed chest pain and SOB. patient also notes constipation, states his last BM was 2-3 weeks ago. denies abd pain, N/V/D, urinary symptoms, leg pain or leg swelling. denies sick contacts or recent travel.

## 2024-07-18 NOTE — ED CLERICAL - NS ED CARE COORDINATION INFORMATION
This patient is eligible for outpatient care navigation through the [facility] readmission reduction initiative. This patient will be engaged by the transitional care management team to enroll into this program. Please call the number above to facilitate this enrollment or for close follow up.  [phone]

## 2024-07-18 NOTE — ED ADULT NURSE REASSESSMENT NOTE - NS ED NURSE REASSESS COMMENT FT1
rec pt. from day shift. delay in sepsis labs/meds as pt. had no IV access. rec pt. from day shift. delay in sepsis labs/meds as pt. had no IV access. pt. wakes up to name, does not want to answer questions. no acute resp distress noted, mucus noted from nose. safety maintained.

## 2024-07-18 NOTE — ED PROVIDER NOTE - NSFOLLOWUPINSTRUCTIONS_ED_ALL_ED_FT
Pneumonia    Please take augmentin and azithromycin as prescribed. Please follow up with your primary doctor.     Pneumonia is an infection of the lungs. Pneumonia may be caused by bacteria, viruses, or funguses. Symptoms include coughing, fever, chest pain when breathing deeply or coughing, shortness of breath, fatigue, or muscle aches. Pneumonia can be diagnosed with a medical history and physical exam, as well as other tests which may include a chest X-ray. If you were prescribed an antibiotic medicine, take it as told by your health care provider and do not stop taking the antibiotic even if you start to feel better. Do not use tobacco products, including cigarettes, chewing tobacco, and e-cigarettes.    SEEK IMMEDIATE MEDICAL CARE IF YOU HAVE ANY OF THE FOLLOWING SYMPTOMS: worsening shortness of breath, worsening chest pain, coughing up blood, change in mental status, lightheadedness/dizziness.

## 2024-07-18 NOTE — ED PROVIDER NOTE - PHYSICAL EXAMINATION
General: Awake, alert, lying in bed in NAD  HEENT: Normocephalic, atraumatic. No scleral icterus or conjunctival injection. EOMI. Moist mucous membranes. Oropharynx clear.   Neck:. Soft and supple.  Cardiac: tachycardic, Peripheral pulses 2+ and symmetric. No LE edema.  Resp: Lungs CTAB. No accessory muscle use.   Abd: Soft, non-tender, non-distended. No guarding, rebound, or rigidity.  Back: Spine midline and non-tender.   Skin: No rashes, abrasions, or lacerations.  Neuro: AO x 4. Moves all extremities symmetrically. Motor strength and sensation grossly intact.  Psych: Appropriate mood and affect General: Awake, alert, lying in bed in NAD  HEENT: Normocephalic, atraumatic. No scleral icterus or conjunctival injection. EOMI. Moist mucous membranes. Oropharynx clear.   Neck:. Soft and supple.  Cardiac: tachycardic, Peripheral pulses 2+ and symmetric. No LE edema.  Resp: Lungs CTAB. No accessory muscle use.   Abd: Soft, non-tender, non-distended. No guarding, rebound, or rigidity.  Back: Spine midline and non-tender.   Skin: No rashes, abrasions, or lacerations. warm to the touch  Neuro: AO x 4. Moves all extremities symmetrically. Motor strength and sensation grossly intact.  Psych: Appropriate mood and affect General: Awake, alert, lying in bed in NAD  HEENT: Normocephalic, atraumatic. No scleral icterus or conjunctival injection. EOMI. Moist mucous membranes. Oropharynx clear.   Neck:. Soft and supple.  Cardiac: tachycardic, Peripheral pulses 2+ and symmetric. No LE edema.  Resp: Lungs CTAB. No accessory muscle use.   Abd: Soft, RLQ tenderness, non-distended. No guarding, rebound, or rigidity.  Back: Spine midline and non-tender.   Skin: No rashes, abrasions, or lacerations. warm to the touch  Neuro: AO x 4. Moves all extremities symmetrically. Motor strength and sensation grossly intact.  Psych: Appropriate mood and affect

## 2024-07-19 RX ORDER — AMOXICILLIN/POTASSIUM CLAV 250-125 MG
875 TABLET ORAL
Qty: 10 | Refills: 0
Start: 2024-07-19 | End: 2024-07-23

## 2024-07-19 RX ORDER — AZITHROMYCIN 250 MG/1
1 TABLET, FILM COATED ORAL
Qty: 4 | Refills: 0
Start: 2024-07-19 | End: 2024-07-22

## 2024-07-19 NOTE — ED ADULT NURSE NOTE - NSICDXPASTMEDICALHX_GEN_ALL_CORE_FT
PAST MEDICAL HISTORY:  Asthma     Chronic sinusitis     Closed fracture of multiple ribs of right side, initial encounter     Cocaine abuse     GBS (Guillain [place] syndrome)     HIV (human immunodeficiency virus infection) from birth    HIV disease     Homeless

## 2024-07-20 LAB
CULTURE RESULTS: SIGNIFICANT CHANGE UP
SPECIMEN SOURCE: SIGNIFICANT CHANGE UP

## 2024-07-23 DIAGNOSIS — R50.9 FEVER, UNSPECIFIED: ICD-10-CM

## 2024-07-23 DIAGNOSIS — Z21 ASYMPTOMATIC HUMAN IMMUNODEFICIENCY VIRUS [HIV] INFECTION STATUS: ICD-10-CM

## 2024-07-23 DIAGNOSIS — J18.9 PNEUMONIA, UNSPECIFIED ORGANISM: ICD-10-CM

## 2024-07-23 DIAGNOSIS — K59.00 CONSTIPATION, UNSPECIFIED: ICD-10-CM

## 2024-07-23 DIAGNOSIS — G61.0 GUILLAIN-BARRE SYNDROME: ICD-10-CM

## 2024-08-13 ENCOUNTER — EMERGENCY (EMERGENCY)
Facility: HOSPITAL | Age: 34
LOS: 1 days | End: 2024-08-13
Attending: EMERGENCY MEDICINE
Payer: COMMERCIAL

## 2024-08-13 VITALS
SYSTOLIC BLOOD PRESSURE: 131 MMHG | DIASTOLIC BLOOD PRESSURE: 79 MMHG | HEART RATE: 76 BPM | OXYGEN SATURATION: 98 % | TEMPERATURE: 98 F | RESPIRATION RATE: 18 BRPM | WEIGHT: 149.91 LBS | HEIGHT: 72 IN

## 2024-08-13 VITALS
TEMPERATURE: 98 F | HEART RATE: 64 BPM | RESPIRATION RATE: 18 BRPM | SYSTOLIC BLOOD PRESSURE: 144 MMHG | DIASTOLIC BLOOD PRESSURE: 91 MMHG | OXYGEN SATURATION: 98 %

## 2024-08-13 DIAGNOSIS — Z98.890 OTHER SPECIFIED POSTPROCEDURAL STATES: Chronic | ICD-10-CM

## 2024-08-13 LAB
ALBUMIN SERPL ELPH-MCNC: 3.3 G/DL — SIGNIFICANT CHANGE UP (ref 3.3–5.2)
ALP SERPL-CCNC: 55 U/L — SIGNIFICANT CHANGE UP (ref 40–120)
ALT FLD-CCNC: 14 U/L — SIGNIFICANT CHANGE UP
ANION GAP SERPL CALC-SCNC: 14 MMOL/L — SIGNIFICANT CHANGE UP (ref 5–17)
AST SERPL-CCNC: 41 U/L — HIGH
BASOPHILS # BLD AUTO: 0.01 K/UL — SIGNIFICANT CHANGE UP (ref 0–0.2)
BASOPHILS NFR BLD AUTO: 0.3 % — SIGNIFICANT CHANGE UP (ref 0–2)
BILIRUB SERPL-MCNC: 0.3 MG/DL — LOW (ref 0.4–2)
BUN SERPL-MCNC: 10.4 MG/DL — SIGNIFICANT CHANGE UP (ref 8–20)
CALCIUM SERPL-MCNC: 9 MG/DL — SIGNIFICANT CHANGE UP (ref 8.4–10.5)
CHLORIDE SERPL-SCNC: 99 MMOL/L — SIGNIFICANT CHANGE UP (ref 96–108)
CO2 SERPL-SCNC: 24 MMOL/L — SIGNIFICANT CHANGE UP (ref 22–29)
CREAT SERPL-MCNC: 0.67 MG/DL — SIGNIFICANT CHANGE UP (ref 0.5–1.3)
EGFR: 125 ML/MIN/1.73M2 — SIGNIFICANT CHANGE UP
EOSINOPHIL # BLD AUTO: 0.03 K/UL — SIGNIFICANT CHANGE UP (ref 0–0.5)
EOSINOPHIL NFR BLD AUTO: 0.8 % — SIGNIFICANT CHANGE UP (ref 0–6)
GLUCOSE SERPL-MCNC: 75 MG/DL — SIGNIFICANT CHANGE UP (ref 70–99)
HCT VFR BLD CALC: 31.8 % — LOW (ref 39–50)
HGB BLD-MCNC: 10.3 G/DL — LOW (ref 13–17)
IMM GRANULOCYTES NFR BLD AUTO: 0.6 % — SIGNIFICANT CHANGE UP (ref 0–0.9)
LYMPHOCYTES # BLD AUTO: 1.68 K/UL — SIGNIFICANT CHANGE UP (ref 1–3.3)
LYMPHOCYTES # BLD AUTO: 46.7 % — HIGH (ref 13–44)
MCHC RBC-ENTMCNC: 29.2 PG — SIGNIFICANT CHANGE UP (ref 27–34)
MCHC RBC-ENTMCNC: 32.4 GM/DL — SIGNIFICANT CHANGE UP (ref 32–36)
MCV RBC AUTO: 90.1 FL — SIGNIFICANT CHANGE UP (ref 80–100)
MONOCYTES # BLD AUTO: 0.5 K/UL — SIGNIFICANT CHANGE UP (ref 0–0.9)
MONOCYTES NFR BLD AUTO: 13.9 % — SIGNIFICANT CHANGE UP (ref 2–14)
NEUTROPHILS # BLD AUTO: 1.36 K/UL — LOW (ref 1.8–7.4)
NEUTROPHILS NFR BLD AUTO: 37.7 % — LOW (ref 43–77)
PLATELET # BLD AUTO: 294 K/UL — SIGNIFICANT CHANGE UP (ref 150–400)
POTASSIUM SERPL-MCNC: 4.1 MMOL/L — SIGNIFICANT CHANGE UP (ref 3.5–5.3)
POTASSIUM SERPL-SCNC: 4.1 MMOL/L — SIGNIFICANT CHANGE UP (ref 3.5–5.3)
PROCALCITONIN SERPL-MCNC: 0.05 NG/ML — SIGNIFICANT CHANGE UP (ref 0.02–0.1)
PROT SERPL-MCNC: 7.4 G/DL — SIGNIFICANT CHANGE UP (ref 6.6–8.7)
RBC # BLD: 3.53 M/UL — LOW (ref 4.2–5.8)
RBC # FLD: 13.7 % — SIGNIFICANT CHANGE UP (ref 10.3–14.5)
SODIUM SERPL-SCNC: 137 MMOL/L — SIGNIFICANT CHANGE UP (ref 135–145)
TROPONIN T, HIGH SENSITIVITY RESULT: 9 NG/L — SIGNIFICANT CHANGE UP (ref 0–51)
WBC # BLD: 3.6 K/UL — LOW (ref 3.8–10.5)
WBC # FLD AUTO: 3.6 K/UL — LOW (ref 3.8–10.5)

## 2024-08-13 PROCEDURE — 71046 X-RAY EXAM CHEST 2 VIEWS: CPT

## 2024-08-13 PROCEDURE — 80053 COMPREHEN METABOLIC PANEL: CPT

## 2024-08-13 PROCEDURE — 85025 COMPLETE CBC W/AUTO DIFF WBC: CPT

## 2024-08-13 PROCEDURE — 36415 COLL VENOUS BLD VENIPUNCTURE: CPT

## 2024-08-13 PROCEDURE — 71046 X-RAY EXAM CHEST 2 VIEWS: CPT | Mod: 26

## 2024-08-13 PROCEDURE — 93005 ELECTROCARDIOGRAM TRACING: CPT

## 2024-08-13 PROCEDURE — 99284 EMERGENCY DEPT VISIT MOD MDM: CPT

## 2024-08-13 PROCEDURE — 93010 ELECTROCARDIOGRAM REPORT: CPT

## 2024-08-13 PROCEDURE — 84484 ASSAY OF TROPONIN QUANT: CPT

## 2024-08-13 PROCEDURE — 84145 PROCALCITONIN (PCT): CPT

## 2024-08-13 PROCEDURE — 99285 EMERGENCY DEPT VISIT HI MDM: CPT | Mod: 25

## 2024-08-13 RX ORDER — DEXTROSE MONOHYDRATE, SODIUM CHLORIDE, SODIUM LACTATE, CALCIUM CHLORIDE, MAGNESIUM CHLORIDE 1.5; 538; 448; 18.4; 5.08 G/100ML; MG/100ML; MG/100ML; MG/100ML; MG/100ML
1000 SOLUTION INTRAPERITONEAL ONCE
Refills: 0 | Status: COMPLETED | OUTPATIENT
Start: 2024-08-13 | End: 2024-08-13

## 2024-08-13 NOTE — ED ADULT NURSE REASSESSMENT NOTE - NS ED NURSE REASSESS COMMENT FT1
Assumed care of patient 0730. Reports current chest pain left sided.  currently at bedside and pt hand cuffed to the bed. A&O x 4. Breathing is spontaneous even and unlabored. Bed is in lowest position and side rails up. Safety precautions maintained.

## 2024-08-13 NOTE — ED ADULT NURSE NOTE - OBJECTIVE STATEMENT
pt to ED with c/o chest pain. hx HIV and GBS. pt states he has had pain for multiple months, worsening today.  worse when taking deep breaths or coughing. currently under arrest. A+O x3. RR even and unlabored on RA. NSR on CM.

## 2024-08-13 NOTE — ED ADULT TRIAGE NOTE - CHIEF COMPLAINT QUOTE
pt c/o chest pain  after doing cocaine and drinking tequila last night. pt currently under arrest badge #[id number]. denies cardiac hx.

## 2024-08-13 NOTE — ED PROVIDER NOTE - PATIENT PORTAL LINK FT
You can access the FollowMyHealth Patient Portal offered by [facility] by registering at the following website: [email or web address]. By joining [facility]’s FollowMyHealth portal, you will also be able to view your health information using other applications (apps) compatible with our system.

## 2024-08-13 NOTE — ED ADULT NURSE NOTE - CHIEF COMPLAINT QUOTE
pt c/o chest pain  after doing cocaine and drinking tequila last night. pt currently under arrest badge #[id number]. denies cardiac hx.

## 2024-08-13 NOTE — ED PROVIDER NOTE - NSICDXPASTMEDICALHX_GEN_ALL_CORE_FT
PAST MEDICAL HISTORY:  Asthma     Chronic sinusitis     Closed fracture of multiple ribs of right side, initial encounter     Cocaine abuse     GBS (Guillain [place] syndrome)     HIV (human immunodeficiency virus infection) from birth    HIV disease     Homeless

## 2024-08-13 NOTE — ED ADULT NURSE NOTE - NSICDXPASTMEDICALHX_GEN_ALL_CORE_FT
PAST MEDICAL HISTORY:  Asthma     Chronic sinusitis     Closed fracture of multiple ribs of right side, initial encounter     Cocaine abuse     GBS (Guillain [place] syndrome)     HIV (human immunodeficiency virus infection) from birth    HIV disease     Homeless

## 2024-08-13 NOTE — ED PROVIDER NOTE - OBJECTIVE STATEMENT
34M history of GBS and HIV noting medication compliance on Biktarvy, undetectable viral load and CD4 count last 3-400 presenting with cough, pleuritic chest pain worse on coughing and deep breathing.  No palpitations, no exertional symptoms or diaphoresis, fevers or chills.  In police custody, no trauma.

## 2024-08-13 NOTE — ED PROVIDER NOTE - CLINICAL SUMMARY MEDICAL DECISION MAKING FREE TEXT BOX
34M will obtain laboratory studies, troponin in setting of cocaine use, EKG and chest x-ray, signed out to oncoming team.

## 2024-08-13 NOTE — ED PROVIDER NOTE - NSFOLLOWUPINSTRUCTIONS_ED_ALL_ED_FT
You are advised to please follow up with your primary care doctor within the next 24 hours and return to the Emergency Department for worsening symptoms or any other concerns.  Your doctor may call [phone] to follow up on the specific results of the tests performed today in the emergency department.    Chest Pain    Chest pain can be caused by many different conditions which may or may not be dangerous. Causes include heartburn, lung infections, heart attack, blood clot in lungs, skin infections, strain or damage to muscle, cartilage, or bones, etc. In addition to a history and physical examination, an electrocardiogram (ECG) or other lab tests may have been performed to determine the cause of your chest pain. Follow up with your primary care provider or with a cardiologist as instructed.     SEEK IMMEDIATE MEDICAL CARE IF YOU HAVE ANY OF THE FOLLOWING SYMPTOMS: worsening chest pain, coughing up blood, unexplained back/neck/jaw pain, severe abdominal pain, dizziness or lightheadedness, fainting, shortness of breath, sweaty or clammy skin, vomiting, or racing heart beat. These symptoms may represent a serious problem that is an emergency. Do not wait to see if the symptoms will go away. Get medical help right away. Call 911 and do not drive yourself to the hospital.

## 2024-08-16 NOTE — PATIENT PROFILE ADULT - NSPRESCRALCFREQ_GEN_A_NUR
"  Discharge Summary - David Skelton Jr. 61 y.o. 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 is a 61 y.o. male who presents with R hip pain after fall. He presented as a Trauma C to the ED, after an unwitnessed fall, with unknown headstrike or downtime. Per ED physician, EMS reported he took his dose of methadone this AM and was confused on arrival, but was able to answer questions. On exam, he was very confused and complaining of pain everywhere. He reported hip pain. He is s/p surgical fixture of a R femur fracture on 7/31 at [facility].\" - Per Dr. Singh's H&P on 8/6/2024    Procedures Performed:   Orders Placed This Encounter   Procedures    ED ECG Documentation Only       Summary of Hospital Course: This is a 61-year-old male who repeat presented for evaluation after suffering a fall at home.  The trauma found based on examination.  Patient was recently discharged from the trauma service at [facility]--during that admission he suffered a right hip fracture and underwent ORIF, he was recommended for physical rehab at that time which she declined.  He would now like to pursue rehab.  Case management was consulted-ultimately rehab placement was not able to be found due to patient's methadone use.  During his hospitalization he was evaluated by neuropsych who ultimately deemed him competent.  He was also evaluated by psychiatry for his bipolar disorder-they adjusted his medications and discontinued his home Cymbalta, Xanax, and Depakote (which he was largely noncompliant with), they did increase his gabapentin to 300 mg 3 times daily.  He was also evaluated by " endocrinology who adjusted his home regimen to include nighttime Lantus (patient was educated on insulin administration and confirmed that he felt comfortable administering it to himself), Prandin, metformin.  Patient was also evaluated by orthopedics for his 2-week [id number] will continue his 28 days of aspirin twice daily for DVT prophylaxis-he will follow-up with orthopedics in 1 month for reevaluation.    Prior to discharge I did have an extensive conversation with patient regarding hospitalization, diagnostic studies, discharge medications, medication changes, return precautions, and recommended [id number] verbally confirmed understanding and confirmed he felt comfortable being discharged given that he is set up to see both [facility] care and has home health care aides coming to his house on 8/19.    Significant Findings, Care, Treatment and Services Provided:   XR knee 1 or 2 vw right    Result Date: 8/7/2024  Impression: No acute osseous abnormality. Workstation performed: [id number]     TRAUMA - CT chest abdomen pelvis w contrast    Result Date: 8/6/2024  Impression: 1.  No acute traumatic injury to the chest, abdomen, or pelvis. 2.  Cholelithiasis with severe gallbladder distention, increased from the recent prior study though without wall thickening or pericholecystic inflammatory change. This is of uncertain etiology and clinical significance and clinical correlation for right  upper quadrant symptoms recommended. 3.  Subcentimeter groundglass density in the right upper lobe, minimally more prominent than in 2023. Based on current Fleischner Society 2017 Guidelines on incidental pulmonary nodule, follow-up CT is recommended for every 2 years until 5 years of stability is demonstrated. 4.  Moderate to diffuse colonic fecal stasis. 5.  1.3 cm right adrenal nodule, indeterminate though likely an adenoma given stability. The study was marked in EPIC for immediate notification. Workstation  performed: [id number]     CT lower extremity w contrast right    Result Date: 8/6/2024  Impression: Status post ORIF comminuted intertrochanteric fracture right proximal femur. Diffuse enlargement of the right thigh musculature, nonspecific but could reflect myositis. No discrete intramuscular hematoma or discrete intramuscular fluid collection. Subcutaneous edema throughout the right thigh without a discrete subcutaneous hematoma. Arterial calcifications. Fecal distention of the rectum suggestive of fecal impaction. Workstation performed: [id number]     TRAUMA - CT head wo contrast    Result Date: 8/6/2024  Impression: No acute intracranial abnormality. The study was marked in EPIC for immediate notification. Workstation performed: [id number]     TRAUMA - CT spine cervical wo contrast    Result Date: 8/6/2024  Impression: No cervical spine fracture or traumatic malalignment. The study was marked in EPIC for immediate notification. Workstation performed: [id number]     XR Trauma chest portable    Result Date: 8/6/2024  Impression: No acute cardiopulmonary disease. Workstation performed: [id number]        Complications: none    Discharge Diagnosis: Right hip fracture, fall, diabetes type 2    Medical Problems       Resolved Problems  Date Reviewed: 8/16/2024   None         Condition at Discharge: fair         Discharge instructions/Information to patient and family:   See after visit summary for information provided to patient and family.      Provisions for Follow-Up Care:  See after visit summary for information related to follow-up care and any pertinent home health orders.      PCP: Casey Mckeon MD    Disposition: See After Visit Summary for discharge disposition information.    Planned Readmission: No      Discharge Statement   I spent 26 minutes discharging the patient. This time was spent on the day of discharge. I had direct contact with the patient on the day of discharge.  Additional documentation is required if more than 30 minutes were spent on discharge.     Discharge Medications:  See after visit summary for reconciled discharge medications provided to patient and family.            Never

## 2024-08-30 ENCOUNTER — EMERGENCY (EMERGENCY)
Facility: HOSPITAL | Age: 34
LOS: 1 days | Discharge: HOME | End: 2024-08-30
Attending: EMERGENCY MEDICINE
Payer: COMMERCIAL

## 2024-08-30 VITALS
HEART RATE: 82 BPM | HEIGHT: 72 IN | TEMPERATURE: 98 F | WEIGHT: 184.97 LBS | OXYGEN SATURATION: 97 % | RESPIRATION RATE: 18 BRPM | SYSTOLIC BLOOD PRESSURE: 128 MMHG | DIASTOLIC BLOOD PRESSURE: 75 MMHG

## 2024-08-30 DIAGNOSIS — Z98.890 OTHER SPECIFIED POSTPROCEDURAL STATES: Chronic | ICD-10-CM

## 2024-08-30 PROCEDURE — 99284 EMERGENCY DEPT VISIT MOD MDM: CPT | Mod: 25

## 2024-08-30 PROCEDURE — 99284 EMERGENCY DEPT VISIT MOD MDM: CPT

## 2024-08-30 PROCEDURE — 96374 THER/PROPH/DIAG INJ IV PUSH: CPT

## 2024-08-30 RX ADMIN — Medication 4 MILLIGRAM(S): at 16:58

## 2024-08-30 NOTE — ED PROVIDER NOTE - ATTENDING CONTRIBUTION TO CARE
33 y/o M with PMH of GBS, and HIV on Biktarvy presents to the ED c/o numbness/tingling of b/l feet. Patient received Morphine for pain control, which provided relief. Patient takes Gabapentin daily for symptom control. Patient recommended to continue Gabapentin as scheduled for symptom relief and follow up with his neurologist for maintenance.

## 2024-08-30 NOTE — ED PROVIDER NOTE - CLINICAL SUMMARY MEDICAL DECISION MAKING FREE TEXT BOX
33 y/o M with PMH of GBS, and HIV on Biktarvy presents to the ED c/o L foot pain. Pending XR foot 35 y/o M with PMH of GBS, and HIV on Biktarvy presents to the ED c/o numbness/tingling of b/l feet. Patient received Morphine for pain control, which provded relief. Patient takes Gabapentin daily for symptom control. Patient recommended to continue Gabapentin as scheduled for symptom relief and follow up with his neurologist for maintenance. 35 y/o M with PMH of GBS, and HIV on Biktarvy presents to the ED c/o numbness/tingling of b/l feet. Patient received Morphine for pain control, which provided relief. Patient takes Gabapentin daily for symptom control. Patient recommended to continue Gabapentin as scheduled for symptom relief and follow up with his neurologist for maintenance.

## 2024-08-30 NOTE — ED ADULT NURSE NOTE - NSFALLRISKINTERV_ED_ALL_ED

## 2024-08-30 NOTE — ED ADULT NURSE REASSESSMENT NOTE - NS ED NURSE REASSESS COMMENT FT1
Patient A&Ox3 sleeping in bed, arousable on painful stimulation. Patient c/o pain in left hand. continues to be lethargic and drowsy. iv catheter started in right top of hand. Morphine given for pain as per orders.

## 2024-08-30 NOTE — ED ADULT TRIAGE NOTE - CHIEF COMPLAINT QUOTE
Vaginal Delivery Note    Details of Procedure: The patient is a 27 y.o. female at 38w3d   OB History          3    Para   1    Term           1    AB        Living   1         SAB        IAB        Ectopic        Molar        Multiple   0    Live Births   1             who was admitted for induction. She received the following interventions: vaginal Cytotec and IV Pitocin augmentation She was known to be GBS negative and did not receive antibiotic prophylaxis. The patient progressed well,did receive an epidural, became complete and started to push. After pushing for 3 times the fetal head was at the perineum, nose and mouth suctioned with bulb suction and the rest of the infant delivered atraumatically, placed on mother abdomen. Cord was clamped and cut and infant handed off to the waiting nurse for evaluation. The delivery of the placenta was spontaneous. The perineum and vagina were explored and a second degree laceration was repaired in standard fashion. Female infant delivered at 200 apgars of 8 and 9    Anesthesia:  epidural anesthesia    Estimated blood loss:  300ml    Specimen:  Placenta not sent to pathology     Cord blood sent Yes    Complications:  none    Condition:  infant stable to general nursery    MD ANUJ MoeOG [place]. pt states the pain started 2 days ago. hx of HIV , no other complaints.

## 2024-08-30 NOTE — ED PROVIDER NOTE - PHYSICAL EXAMINATION
General: Somnolent, lying in bed in NAD  HEENT: Normocephalic, atraumatic. No scleral icterus or conjunctival injection. EOMI.  Cardiac: RRR,   Resp: Symmetric chest expansion with inspiration. No accessory muscle use  Abd: Soft, non-tender, non-distended.  Skin: No rashes, abrasions, or lacerations.  Extremities: +palpable DP pulses. No LE edema. No ulcers, ecchymosis, or petechiae on b/l feet.  Neuro: AO x 4. Moves all extremities symmetrically. Motor strength and sensation grossly intact.  Psych: Appropriate mood and affect

## 2024-08-30 NOTE — ED PROVIDER NOTE - NSICDXPASTMEDICALHX_GEN_ALL_CORE_FT
PAST MEDICAL HISTORY:  Asthma     Chronic sinusitis     Closed fracture of multiple ribs of right side, initial encounter     Cocaine abuse     GBS (Guillain [place] syndrome)     HIV (human immunodeficiency virus infection) from birth    HIV disease     Homeless

## 2024-08-30 NOTE — ED PROVIDER NOTE - NSFOLLOWUPINSTRUCTIONS_ED_ALL_ED_FT
Patient recommended to continue Gabapentin as scheduled for symptom relief and follow up with his neurologist for maintenance.     Paresthesia  Paresthesia is a burning or prickling feeling. This feeling can happen in any part of the body. It often happens in the hands, arms, legs, or feet. Usually, it is not painful. In most cases, the feeling goes away in a short time and is not a sign of a serious problem. If you have paresthesia that lasts a long time, you need to see your doctor.    Follow these instructions at home:  Nutrition    A plate with examples of foods in a healthy diet.  Eat a healthy diet. This includes:  Eating foods that are high in fiber. These include beans, whole grains, and fresh fruits and vegetables.  Limiting foods that are high in fat and sugar. These include fried or sweet foods.  Alcohol use    A sign showing that a person should not drink alcohol.  Do not drink alcohol if:  Your doctor tells you not to drink.  You are pregnant, may be pregnant, or are planning to become pregnant.  If you drink alcohol:  Limit how much you have to:  0–1 drink a day for women.  0–2 drinks a day for men.  Know how much alcohol is in your drink. In the [place] one drink equals one 12 oz bottle of beer (355 mL), one 5 oz glass of wine (148 mL), or one 1½ oz glass of hard liquor (44 mL).  General instructions    Take over-the-counter and prescription medicines only as told by your doctor.  Do not smoke or use any products that contain nicotine or tobacco. If you need help quitting, ask your doctor.  If you have diabetes, work with your doctor to make sure your blood sugar stays in a healthy range.  If your feet feel numb:  Check for redness, warmth, and swelling every day.  Wear padded socks and comfortable shoes. These help protect your feet.  Keep all follow-up visits.  Contact a doctor if:  You have paresthesia that gets worse or does not go away.  You lose feeling (have numbness) after an injury.  Your burning or prickling feeling gets worse when you walk.  You have pain or cramps.  You feel dizzy or you faint.  You have a rash.  Get help right away if:  You feel weak or have new weakness in an arm or leg.  You have trouble walking or moving.  You have problems speaking, understanding, or seeing.  You feel confused.  You cannot control when you pee (urinate) or poop (have a bowel movement).  These symptoms may be an emergency. Get help right away. Call 911.  Do not wait to see if the symptoms will go away.  Do not drive yourself to the hospital.  Summary  Paresthesia is a burning or prickling feeling. It often happens in the hands, arms, legs, or feet.  In most cases, the feeling goes away in a short time and is not a sign of a serious problem.  If you have paresthesia that lasts a long time, you need to be seen by your doctor.  This information is not intended to replace advice given to you by your health care provider. Make sure you discuss any questions you have with your health care provider. Patient recommended to continue Gabapentin as scheduled for symptom relief and follow up with his neurologist for maintenance.    Paresthesia  Paresthesia is a burning or prickling feeling. This feeling can happen in any part of the body. It often happens in the hands, arms, legs, or feet. Usually, it is not painful. In most cases, the feeling goes away in a short time and is not a sign of a serious problem. If you have paresthesia that lasts a long time, you need to see your doctor.    Follow these instructions at home:  Nutrition    A plate with examples of foods in a healthy diet.  Eat a healthy diet. This includes:  Eating foods that are high in fiber. These include beans, whole grains, and fresh fruits and vegetables.  Limiting foods that are high in fat and sugar. These include fried or sweet foods.  Alcohol use    A sign showing that a person should not drink alcohol.  Do not drink alcohol if:  Your doctor tells you not to drink.  You are pregnant, may be pregnant, or are planning to become pregnant.  If you drink alcohol:  Limit how much you have to:  0–1 drink a day for women.  0–2 drinks a day for men.  Know how much alcohol is in your drink. In the [place] one drink equals one 12 oz bottle of beer (355 mL), one 5 oz glass of wine (148 mL), or one 1½ oz glass of hard liquor (44 mL).  General instructions    Take over-the-counter and prescription medicines only as told by your doctor.  Do not smoke or use any products that contain nicotine or tobacco. If you need help quitting, ask your doctor.  If you have diabetes, work with your doctor to make sure your blood sugar stays in a healthy range.  If your feet feel numb:  Check for redness, warmth, and swelling every day.  Wear padded socks and comfortable shoes. These help protect your feet.  Keep all follow-up visits.  Contact a doctor if:  You have paresthesia that gets worse or does not go away.  You lose feeling (have numbness) after an injury.  Your burning or prickling feeling gets worse when you walk.  You have pain or cramps.  You feel dizzy or you faint.  You have a rash.  Get help right away if:  You feel weak or have new weakness in an arm or leg.  You have trouble walking or moving.  You have problems speaking, understanding, or seeing.  You feel confused.  You cannot control when you pee (urinate) or poop (have a bowel movement).  These symptoms may be an emergency. Get help right away. Call 911.  Do not wait to see if the symptoms will go away.  Do not drive yourself to the hospital.  Summary  Paresthesia is a burning or prickling feeling. It often happens in the hands, arms, legs, or feet.  In most cases, the feeling goes away in a short time and is not a sign of a serious problem.  If you have paresthesia that lasts a long time, you need to be seen by your doctor.  This information is not intended to replace advice given to you by your health care provider. Make sure you discuss any questions you have with your health care provider.

## 2024-08-30 NOTE — ED PROVIDER NOTE - OBJECTIVE STATEMENT
33 y/o M with PMH of GBS, and HIV on Biktarvy presents to the ED c/o L foot pain. Patient notes the pain started 2 days ago and is localized to the foot. Patient is mildly arousable and had difficulty speaking about the events prior to the L foot pain. Unable to obtain ROS due to decreased responsiveness.

## 2024-08-30 NOTE — ED ADULT NURSE NOTE - OBJECTIVE STATEMENT
35 YO male presented to the ED with c/o pain and numbness in  left foot while playing tag 2 days ago, no head strike.  patient is drowsy and lethargic responds to shaking the arm. PMH HIV . On Biktarvy. patient denies any fever, chills, SOB, chest pain, nausea, vomiting or abdominal pain.

## 2024-08-30 NOTE — ED PROVIDER NOTE - PROGRESS NOTE DETAILS
Patient is more arousable and responsive during encounter MD Fred: Patient is more arousable and responsive during second encounter. Patient states there was no trauma to the foot. Patient states the pain is bilateral numbness/tingling. Patient states he has history of GBS and he experiences these symptoms intermittently. Patient will received Morphine for pain control.

## 2024-08-30 NOTE — ED ADULT NURSE NOTE - CHIEF COMPLAINT QUOTE
[place]. pt states the pain started 2 days ago. hx of HIV , no other complaints.

## 2024-08-30 NOTE — ED PROVIDER NOTE - PATIENT PORTAL LINK FT
You can access the FollowMyHealth Patient Portal offered by [facility] by registering at the following website: [email or web address]. By joining [email or web address]’s FollowMyHealth portal, you will also be able to view your health information using other applications (apps) compatible with our system.

## 2024-09-03 ENCOUNTER — EMERGENCY (EMERGENCY)
Facility: HOSPITAL | Age: 34
LOS: 1 days | Discharge: HOME | End: 2024-09-03
Attending: STUDENT IN AN ORGANIZED HEALTH CARE EDUCATION/TRAINING PROGRAM
Payer: COMMERCIAL

## 2024-09-03 VITALS
RESPIRATION RATE: 16 BRPM | OXYGEN SATURATION: 99 % | SYSTOLIC BLOOD PRESSURE: 120 MMHG | TEMPERATURE: 98 F | HEART RATE: 80 BPM | DIASTOLIC BLOOD PRESSURE: 70 MMHG

## 2024-09-03 VITALS
RESPIRATION RATE: 17 BRPM | HEIGHT: 72 IN | HEART RATE: 94 BPM | TEMPERATURE: 98 F | OXYGEN SATURATION: 100 % | SYSTOLIC BLOOD PRESSURE: 116 MMHG | DIASTOLIC BLOOD PRESSURE: 73 MMHG

## 2024-09-03 DIAGNOSIS — Z98.890 OTHER SPECIFIED POSTPROCEDURAL STATES: Chronic | ICD-10-CM

## 2024-09-03 LAB
FLUAV AG NPH QL: SIGNIFICANT CHANGE UP
FLUBV AG NPH QL: SIGNIFICANT CHANGE UP
RSV RNA NPH QL NAA+NON-PROBE: SIGNIFICANT CHANGE UP
S PYO DNA THROAT QL NAA+PROBE: SIGNIFICANT CHANGE UP
SARS-COV-2 RNA SPEC QL NAA+PROBE: SIGNIFICANT CHANGE UP

## 2024-09-03 PROCEDURE — 87637 SARSCOV2&INF A&B&RSV AMP PRB: CPT

## 2024-09-03 PROCEDURE — 99283 EMERGENCY DEPT VISIT LOW MDM: CPT | Mod: 25

## 2024-09-03 PROCEDURE — 87651 STREP A DNA AMP PROBE: CPT

## 2024-09-03 PROCEDURE — 87798 DETECT AGENT NOS DNA AMP: CPT

## 2024-09-03 PROCEDURE — 99283 EMERGENCY DEPT VISIT LOW MDM: CPT

## 2024-09-03 RX ORDER — ACETAMINOPHEN 325 MG/1
975 TABLET ORAL ONCE
Refills: 0 | Status: COMPLETED | OUTPATIENT
Start: 2024-09-03 | End: 2024-09-03

## 2024-09-03 RX ADMIN — ACETAMINOPHEN 975 MILLIGRAM(S): 325 TABLET ORAL at 06:33

## 2024-09-03 RX ADMIN — ACETAMINOPHEN 975 MILLIGRAM(S): 325 TABLET ORAL at 06:32

## 2024-09-03 NOTE — ED PROVIDER NOTE - OBJECTIVE STATEMENT
34y M w/ hx HIV on Biktarvy, Guillain Carlisle syndrome, presents for cough and sore throat x 3 days. No fever. Did not self-medicate prior to arrival.

## 2024-09-03 NOTE — ED PROVIDER NOTE - IV ALTEPLASE EXCL REL HIDDEN
show Information: Selecting Yes will display possible errors in your note based on the variables you have selected. This validation is only offered as a suggestion for you. PLEASE NOTE THAT THE VALIDATION TEXT WILL BE REMOVED WHEN YOU FINALIZE YOUR NOTE. IF YOU WANT TO FAX A PRELIMINARY NOTE YOU WILL NEED TO TOGGLE THIS TO 'NO' IF YOU DO NOT WANT IT IN YOUR FAXED NOTE.

## 2024-09-03 NOTE — ED PROVIDER NOTE - NSFOLLOWUPINSTRUCTIONS_ED_ALL_ED_FT
Viral Illness, Adult  Viruses are tiny germs that can get into a person's body and cause illness. There are many different types of viruses. And they cause many types of illness. Viral illnesses can range from mild to severe. They can affect various parts of the body.    Short-term conditions that are caused by a virus include colds and flu (influenza) and stomach viruses. Long-term conditions that are caused by a virus include herpes, shingles, and human immunodeficiency virus (HIV) infection. A few viruses have been linked to certain cancers.    What are the causes?  Many types of viruses can cause illness. Viruses get into cells in your body, multiply, and cause the infected cells to work differently or die. When these cells die, they release more of the virus. When this happens, you get symptoms of the illness and the virus spreads to other cells. If the virus takes over how the cell works, it can cause the cell to divide and grow out of control. This happens when a virus causes cancer.    Different viruses get into the body in different ways. You can get a virus by:  Swallowing food or water that has come in contact with the virus.  Breathing in droplets that have been coughed or sneezed into the air by an infected person.  Touching a surface that has the virus on it and then touching your eyes, nose, or mouth.  Being bitten by an insect or animal that carries the virus.  Having sexual contact with a person who is infected with the virus.  Being exposed to blood or fluids that contain the virus, either through an open cut or during a transfusion.  If a virus enters your body, your body's disease-fighting system (immune system) will try to fight the virus. You may be at higher risk for a viral illness if your immune system is weak.    What are the signs or symptoms?  Symptoms depend on the type of virus and the location of the cells that it gets into. Symptoms can include:  For cold and flu viruses:  Fever.  Headache.  Sore throat.  Muscle aches.  Stuffy nose (nasal congestion).  Cough.  For stomach (gastrointestinal) viruses:  Fever.  Pain in the abdomen.  Nausea or vomiting.  Diarrhea.  For liver viruses (hepatitis):  Loss of appetite.  Feeling tired.  Skin or the white parts of your eyes turning yellow (jaundice).  For brain and spinal cord viruses:  Fever.  Headache.  Stiff neck.  Nausea and vomiting.  Confusion or being sleepy.  For skin viruses:  Warts.  Itching.  Rash.  For sexually transmitted viruses:  Discharge.  Swelling.  Redness.  Rash.  How is this diagnosed?  This condition may be diagnosed based on one or more of these:  Your symptoms and medical history.  A physical exam.  Tests, such as:  Blood tests.  Tests on a sample of mucus from your lungs (sputum sample).  Tests on a poop (stool) sample.  Tests on a swab of body fluids or a skin sore (lesion).  How is this treated?  Viruses can be hard to treat because they live within cells. Antibiotics do not treat viruses because these medicines do not get inside cells. Treatment for a viral illness may include:  Resting and drinking a lot of fluids.  Medicines to treat symptoms. These can include over-the-counter medicine for pain and fever, medicines for cough or congestion, and medicines for diarrhea.  Antiviral medicines. These medicines are available only for certain types of viruses.  Some viral illnesses can be prevented with vaccinations. A common example is the flu shot.    Follow these instructions at home:  Medicines    Take over-the-counter and prescription medicines only as told by your health care provider.  If you were prescribed an antiviral medicine, take it as told by your provider. Do not stop taking the antiviral even if you start to feel better.  Know when antibiotics are needed and when they are not needed. Antibiotics do not treat viruses. You may get an antibiotic if your provider thinks that you may have, or are at risk for, a bacterial infection and you have a viral infection.  Do not ask for an antibiotic prescription if you have been diagnosed with a viral illness. Antibiotics will not make your illness go away faster.  Taking antibiotics when they are not needed can lead to antibiotic resistance. When this develops, the medicine no longer works against the bacteria that it normally fights.  General instructions    Drink enough fluids to keep your pee (urine) pale yellow.  Rest as much as possible.  Return to your normal activities as told by your provider. Ask your provider what activities are safe for you.  How is this prevented?  A person washing hands with soap and water.  To lower your risk of getting another viral illness:  Wash your hands often with soap and water for at least 20 seconds. If soap and water are not available, use hand .  Avoid touching your nose, eyes, and mouth, especially if you have not washed your hands recently.  If anyone in your household has a viral infection, clean all household surfaces that may have been in contact with the virus. Use soap and hot water. You may also use a commercially prepared, bleach-containing solution.  Stay away from people who are sick with symptoms of a viral infection.  Do not share items such as toothbrushes and water bottles with other people.  Keep your vaccinations up to date. This includes getting a yearly flu shot.  Eat a healthy diet and get plenty of rest.  Contact a health care provider if:  You have symptoms of a viral illness that do not go away.  Your symptoms come back after going away.  Your symptoms get worse.  Get help right away if:  You have trouble breathing.  You have a severe headache or a stiff neck.  You have severe vomiting or pain in your abdomen.  These symptoms may be an emergency. Get help right away. Call 911.  Do not wait to see if the symptoms will go away.  Do not drive yourself to the hospital.  This information is not intended to replace advice given to you by your health care provider. Make sure you discuss any questions you have with your health care provider. Your viral panel result is still pending.  Somebody will call you if it is positive, if you do not hear and are curious please call the emergency department.    Please continue to follow-up with your primary care doctor on an outpatient basis. The see below for clinic information if you need    Viral Illness, Adult  Viruses are tiny germs that can get into a person's body and cause illness. There are many different types of viruses. And they cause many types of illness. Viral illnesses can range from mild to severe. They can affect various parts of the body.    Short-term conditions that are caused by a virus include colds and flu (influenza) and stomach viruses. Long-term conditions that are caused by a virus include herpes, shingles, and human immunodeficiency virus (HIV) infection. A few viruses have been linked to certain cancers.    What are the causes?  Many types of viruses can cause illness. Viruses get into cells in your body, multiply, and cause the infected cells to work differently or die. When these cells die, they release more of the virus. When this happens, you get symptoms of the illness and the virus spreads to other cells. If the virus takes over how the cell works, it can cause the cell to divide and grow out of control. This happens when a virus causes cancer.    Different viruses get into the body in different ways. You can get a virus by:  Swallowing food or water that has come in contact with the virus.  Breathing in droplets that have been coughed or sneezed into the air by an infected person.  Touching a surface that has the virus on it and then touching your eyes, nose, or mouth.  Being bitten by an insect or animal that carries the virus.  Having sexual contact with a person who is infected with the virus.  Being exposed to blood or fluids that contain the virus, either through an open cut or during a transfusion.  If a virus enters your body, your body's disease-fighting system (immune system) will try to fight the virus. You may be at higher risk for a viral illness if your immune system is weak.    What are the signs or symptoms?  Symptoms depend on the type of virus and the location of the cells that it gets into. Symptoms can include:  For cold and flu viruses:  Fever.  Headache.  Sore throat.  Muscle aches.  Stuffy nose (nasal congestion).  Cough.  For stomach (gastrointestinal) viruses:  Fever.  Pain in the abdomen.  Nausea or vomiting.  Diarrhea.  For liver viruses (hepatitis):  Loss of appetite.  Feeling tired.  Skin or the white parts of your eyes turning yellow (jaundice).  For brain and spinal cord viruses:  Fever.  Headache.  Stiff neck.  Nausea and vomiting.  Confusion or being sleepy.  For skin viruses:  Warts.  Itching.  Rash.  For sexually transmitted viruses:  Discharge.  Swelling.  Redness.  Rash.  How is this diagnosed?  This condition may be diagnosed based on one or more of these:  Your symptoms and medical history.  A physical exam.  Tests, such as:  Blood tests.  Tests on a sample of mucus from your lungs (sputum sample).  Tests on a poop (stool) sample.  Tests on a swab of body fluids or a skin sore (lesion).  How is this treated?  Viruses can be hard to treat because they live within cells. Antibiotics do not treat viruses because these medicines do not get inside cells. Treatment for a viral illness may include:  Resting and drinking a lot of fluids.  Medicines to treat symptoms. These can include over-the-counter medicine for pain and fever, medicines for cough or congestion, and medicines for diarrhea.  Antiviral medicines. These medicines are available only for certain types of viruses.  Some viral illnesses can be prevented with vaccinations. A common example is the flu shot.    Follow these instructions at home:  Medicines    Take over-the-counter and prescription medicines only as told by your health care provider.  If you were prescribed an antiviral medicine, take it as told by your provider. Do not stop taking the antiviral even if you start to feel better.  Know when antibiotics are needed and when they are not needed. Antibiotics do not treat viruses. You may get an antibiotic if your provider thinks that you may have, or are at risk for, a bacterial infection and you have a viral infection.  Do not ask for an antibiotic prescription if you have been diagnosed with a viral illness. Antibiotics will not make your illness go away faster.  Taking antibiotics when they are not needed can lead to antibiotic resistance. When this develops, the medicine no longer works against the bacteria that it normally fights.  General instructions    Drink enough fluids to keep your pee (urine) pale yellow.  Rest as much as possible.  Return to your normal activities as told by your provider. Ask your provider what activities are safe for you.  How is this prevented?  A person washing hands with soap and water.  To lower your risk of getting another viral illness:  Wash your hands often with soap and water for at least 20 seconds. If soap and water are not available, use hand .  Avoid touching your nose, eyes, and mouth, especially if you have not washed your hands recently.  If anyone in your household has a viral infection, clean all household surfaces that may have been in contact with the virus. Use soap and hot water. You may also use a commercially prepared, bleach-containing solution.  Stay away from people who are sick with symptoms of a viral infection.  Do not share items such as toothbrushes and water bottles with other people.  Keep your vaccinations up to date. This includes getting a yearly flu shot.  Eat a healthy diet and get plenty of rest.  Contact a health care provider if:  You have symptoms of a viral illness that do not go away.  Your symptoms come back after going away.  Your symptoms get worse.  Get help right away if:  You have trouble breathing.  You have a severe headache or a stiff neck.  You have severe vomiting or pain in your abdomen.  These symptoms may be an emergency. Get help right away. Call 911.  Do not wait to see if the symptoms will go away.  Do not drive yourself to the hospital.  This information is not intended to replace advice given to you by your health care provider. Make sure you discuss any questions you have with your health care provider.

## 2024-09-03 NOTE — ED PROVIDER NOTE - NS ED ROS FT
Constitutional: no fever  CV: no chest pain  Resp: +cough, no shortness of breath  GI: no abdominal pain, no vomiting, no diarrhea  : no dysuria  MSK: no joint pain  Neuro: no headache

## 2024-09-03 NOTE — ED PROVIDER NOTE - PHYSICAL EXAMINATION
Constitutional: Awake, alert, in no acute distress  Eyes: no scleral icterus  HENT: normocephalic, atraumatic, moist oral mucosa, oropharynx clear, no exudate  Neck: supple  CV: RRR, no murmur  Pulm: non-labored respirations, CTAB  Abdomen: soft, non-tender, non-distended  Extremities: no edema, no deformity  Skin: no rash, no jaundice  Neuro: AAOx3, moving all extremities equally

## 2024-09-03 NOTE — ED ADULT NURSE NOTE - DOES PATIENT HAVE ADVANCE DIRECTIVE
Infectious Disease, Dr Pichardo, is at the bedside accessing the patient. A litre of LR at 500 ml.  Orders received and will continue to monitor.       No

## 2024-09-03 NOTE — ED PROVIDER NOTE - NSFOLLOWUPCLINICS_GEN_ALL_ED_FT
[id number] [place] [address]  Phone: [phone]  Fax:   Follow Up Time: 1-3 Days

## 2024-09-03 NOTE — ED ADULT NURSE NOTE - OBJECTIVE STATEMENT
pt. is aaox4, states he has uri symptoms. swab sent. no acute resp distress noted. safety maintained.

## 2024-09-03 NOTE — ED PROVIDER NOTE - CLINICAL SUMMARY MEDICAL DECISION MAKING FREE TEXT BOX
34y M w/ hx HIV on Biktarvy presents for 3 days of URI symptoms. Stable VS. Likely viral. Will send strep and flu/COVID swabs. Stable for discharge. 34y M w/ hx HIV on Biktarvy presents for 3 days of URI symptoms. Stable VS. Likely viral. Will send strep and flu/COVID swabs. Stable for discharge.    Received patient on signout.  Strep negative, flu COVID RSV pending.  Patient will receive call back if positive.  Patient requesting food and ginger ale which was provided to him.  Patient states he will be going home to his apartment.  Medicaid cab arranged Nancy Grossman MD Attending Physician-

## 2024-09-03 NOTE — ED ADULT TRIAGE NOTE - BP NONINVASIVE SYSTOLIC (MM HG)
Admit to 2w with telemetry and continuous pulse ox     Covid hypoxia  CTPA negative for PE  Pulmonary consulted  Decadron  Negative procal       Amy Pineda, APRN - CNP    116

## 2024-09-03 NOTE — ED PROVIDER NOTE - PATIENT PORTAL LINK FT
You can access the FollowMyHealth Patient Portal offered by [facility] by registering at the following website: [email or web address]. By joining [email or web address]’s FollowMyHealth portal, you will also be able to view your health information using other applications (apps) compatible with our system.

## 2024-09-07 NOTE — ED ADULT NURSE NOTE - CARDIO ASSESSMENT
pt c/o epigastric pain, nausea vomiting x 1 day. has been seen for same problem before and reports meds not helping. LMP current ---

## 2024-09-13 ENCOUNTER — EMERGENCY (EMERGENCY)
Facility: HOSPITAL | Age: 34
LOS: 0 days | Discharge: HOME | End: 2024-09-13
Attending: STUDENT IN AN ORGANIZED HEALTH CARE EDUCATION/TRAINING PROGRAM
Payer: MEDICAID

## 2024-09-13 VITALS
RESPIRATION RATE: 18 BRPM | HEART RATE: 89 BPM | TEMPERATURE: 98 F | DIASTOLIC BLOOD PRESSURE: 67 MMHG | SYSTOLIC BLOOD PRESSURE: 124 MMHG | OXYGEN SATURATION: 98 %

## 2024-09-13 VITALS
OXYGEN SATURATION: 99 % | TEMPERATURE: 98 F | DIASTOLIC BLOOD PRESSURE: 82 MMHG | HEIGHT: 72 IN | SYSTOLIC BLOOD PRESSURE: 139 MMHG | HEART RATE: 95 BPM | RESPIRATION RATE: 18 BRPM

## 2024-09-13 DIAGNOSIS — J45.909 UNSPECIFIED ASTHMA, UNCOMPLICATED: ICD-10-CM

## 2024-09-13 DIAGNOSIS — R51.9 HEADACHE, UNSPECIFIED: ICD-10-CM

## 2024-09-13 DIAGNOSIS — R07.89 OTHER CHEST PAIN: ICD-10-CM

## 2024-09-13 DIAGNOSIS — R53.83 OTHER FATIGUE: ICD-10-CM

## 2024-09-13 DIAGNOSIS — Z88.6 ALLERGY STATUS TO ANALGESIC AGENT: ICD-10-CM

## 2024-09-13 DIAGNOSIS — R06.02 SHORTNESS OF BREATH: ICD-10-CM

## 2024-09-13 DIAGNOSIS — Z21 ASYMPTOMATIC HUMAN IMMUNODEFICIENCY VIRUS [HIV] INFECTION STATUS: ICD-10-CM

## 2024-09-13 DIAGNOSIS — G61.0 GUILLAIN-BARRE SYNDROME: ICD-10-CM

## 2024-09-13 DIAGNOSIS — Z91.018 ALLERGY TO OTHER FOODS: ICD-10-CM

## 2024-09-13 DIAGNOSIS — Z59.00 HOMELESSNESS UNSPECIFIED: ICD-10-CM

## 2024-09-13 DIAGNOSIS — R53.81 OTHER MALAISE: ICD-10-CM

## 2024-09-13 DIAGNOSIS — Z88.1 ALLERGY STATUS TO OTHER ANTIBIOTIC AGENTS: ICD-10-CM

## 2024-09-13 DIAGNOSIS — F19.10 OTHER PSYCHOACTIVE SUBSTANCE ABUSE, UNCOMPLICATED: ICD-10-CM

## 2024-09-13 DIAGNOSIS — Z98.890 OTHER SPECIFIED POSTPROCEDURAL STATES: Chronic | ICD-10-CM

## 2024-09-13 LAB
ALBUMIN SERPL ELPH-MCNC: 3.2 G/DL — LOW (ref 3.3–5)
ALP SERPL-CCNC: 74 U/L — SIGNIFICANT CHANGE UP (ref 40–120)
ALT FLD-CCNC: 29 U/L — SIGNIFICANT CHANGE UP (ref 12–78)
ANION GAP SERPL CALC-SCNC: 6 MMOL/L — SIGNIFICANT CHANGE UP (ref 5–17)
AST SERPL-CCNC: 37 U/L — SIGNIFICANT CHANGE UP (ref 15–37)
BASOPHILS # BLD AUTO: 0.02 K/UL — SIGNIFICANT CHANGE UP (ref 0–0.2)
BASOPHILS NFR BLD AUTO: 0.4 % — SIGNIFICANT CHANGE UP (ref 0–2)
BILIRUB SERPL-MCNC: 0.3 MG/DL — SIGNIFICANT CHANGE UP (ref 0.2–1.2)
BUN SERPL-MCNC: 15 MG/DL — SIGNIFICANT CHANGE UP (ref 7–23)
CALCIUM SERPL-MCNC: 9.2 MG/DL — SIGNIFICANT CHANGE UP (ref 8.5–10.1)
CHLORIDE SERPL-SCNC: 106 MMOL/L — SIGNIFICANT CHANGE UP (ref 96–108)
CO2 SERPL-SCNC: 25 MMOL/L — SIGNIFICANT CHANGE UP (ref 22–31)
CREAT SERPL-MCNC: 0.84 MG/DL — SIGNIFICANT CHANGE UP (ref 0.5–1.3)
EGFR: 117 ML/MIN/1.73M2 — SIGNIFICANT CHANGE UP
EOSINOPHIL # BLD AUTO: 0.09 K/UL — SIGNIFICANT CHANGE UP (ref 0–0.5)
EOSINOPHIL NFR BLD AUTO: 1.7 % — SIGNIFICANT CHANGE UP (ref 0–6)
GLUCOSE SERPL-MCNC: 129 MG/DL — HIGH (ref 70–99)
HCT VFR BLD CALC: 34.6 % — LOW (ref 39–50)
HGB BLD-MCNC: 10.8 G/DL — LOW (ref 13–17)
IMM GRANULOCYTES NFR BLD AUTO: 0.2 % — SIGNIFICANT CHANGE UP (ref 0–0.9)
LYMPHOCYTES # BLD AUTO: 2.87 K/UL — SIGNIFICANT CHANGE UP (ref 1–3.3)
LYMPHOCYTES # BLD AUTO: 55.4 % — HIGH (ref 13–44)
MAGNESIUM SERPL-MCNC: 1.8 MG/DL — SIGNIFICANT CHANGE UP (ref 1.6–2.6)
MCHC RBC-ENTMCNC: 28.9 PG — SIGNIFICANT CHANGE UP (ref 27–34)
MCHC RBC-ENTMCNC: 31.2 GM/DL — LOW (ref 32–36)
MCV RBC AUTO: 92.5 FL — SIGNIFICANT CHANGE UP (ref 80–100)
MONOCYTES # BLD AUTO: 0.64 K/UL — SIGNIFICANT CHANGE UP (ref 0–0.9)
MONOCYTES NFR BLD AUTO: 12.4 % — SIGNIFICANT CHANGE UP (ref 2–14)
NEUTROPHILS # BLD AUTO: 1.55 K/UL — LOW (ref 1.8–7.4)
NEUTROPHILS NFR BLD AUTO: 29.9 % — LOW (ref 43–77)
NT-PROBNP SERPL-SCNC: 14 PG/ML — SIGNIFICANT CHANGE UP (ref 0–125)
PLATELET # BLD AUTO: 276 K/UL — SIGNIFICANT CHANGE UP (ref 150–400)
POTASSIUM SERPL-MCNC: 3.6 MMOL/L — SIGNIFICANT CHANGE UP (ref 3.5–5.3)
POTASSIUM SERPL-SCNC: 3.6 MMOL/L — SIGNIFICANT CHANGE UP (ref 3.5–5.3)
PROT SERPL-MCNC: 8.2 GM/DL — SIGNIFICANT CHANGE UP (ref 6–8.3)
RBC # BLD: 3.74 M/UL — LOW (ref 4.2–5.8)
RBC # FLD: 13.5 % — SIGNIFICANT CHANGE UP (ref 10.3–14.5)
SODIUM SERPL-SCNC: 137 MMOL/L — SIGNIFICANT CHANGE UP (ref 135–145)
TROPONIN I, HIGH SENSITIVITY RESULT: 4.77 NG/L — SIGNIFICANT CHANGE UP
WBC # BLD: 5.18 K/UL — SIGNIFICANT CHANGE UP (ref 3.8–10.5)
WBC # FLD AUTO: 5.18 K/UL — SIGNIFICANT CHANGE UP (ref 3.8–10.5)

## 2024-09-13 PROCEDURE — 83880 ASSAY OF NATRIURETIC PEPTIDE: CPT

## 2024-09-13 PROCEDURE — 83735 ASSAY OF MAGNESIUM: CPT

## 2024-09-13 PROCEDURE — 71046 X-RAY EXAM CHEST 2 VIEWS: CPT | Mod: 26

## 2024-09-13 PROCEDURE — 99285 EMERGENCY DEPT VISIT HI MDM: CPT | Mod: 25

## 2024-09-13 PROCEDURE — 85025 COMPLETE CBC W/AUTO DIFF WBC: CPT

## 2024-09-13 PROCEDURE — 96374 THER/PROPH/DIAG INJ IV PUSH: CPT | Mod: XU

## 2024-09-13 PROCEDURE — 93005 ELECTROCARDIOGRAM TRACING: CPT

## 2024-09-13 PROCEDURE — 84484 ASSAY OF TROPONIN QUANT: CPT

## 2024-09-13 PROCEDURE — 96375 TX/PRO/DX INJ NEW DRUG ADDON: CPT

## 2024-09-13 PROCEDURE — 71046 X-RAY EXAM CHEST 2 VIEWS: CPT

## 2024-09-13 PROCEDURE — 93010 ELECTROCARDIOGRAM REPORT: CPT

## 2024-09-13 PROCEDURE — 99285 EMERGENCY DEPT VISIT HI MDM: CPT

## 2024-09-13 PROCEDURE — 80053 COMPREHEN METABOLIC PANEL: CPT

## 2024-09-13 PROCEDURE — 36415 COLL VENOUS BLD VENIPUNCTURE: CPT

## 2024-09-13 RX ORDER — ACETAMINOPHEN 325 MG/1
1000 TABLET ORAL ONCE
Refills: 0 | Status: COMPLETED | OUTPATIENT
Start: 2024-09-13 | End: 2024-09-13

## 2024-09-13 RX ORDER — METOCLOPRAMIDE HCL 5 MG
10 TABLET ORAL ONCE
Refills: 0 | Status: COMPLETED | OUTPATIENT
Start: 2024-09-13 | End: 2024-09-13

## 2024-09-13 RX ADMIN — ACETAMINOPHEN 400 MILLIGRAM(S): 325 TABLET ORAL at 17:29

## 2024-09-13 RX ADMIN — Medication 10 MILLIGRAM(S): at 17:27

## 2024-09-13 SDOH — ECONOMIC STABILITY - HOUSING INSECURITY: HOMELESSNESS UNSPECIFIED: Z59.00

## 2024-09-13 NOTE — ED PROVIDER NOTE - OBJECTIVE STATEMENT
34-year-old male PMH HIV, asthma, sinusitis, drug abuse, GBS, homelessness, presents emergency department for chest pain.  Patient states he was seen at [facility] 5 days ago, diagnosed with pneumonia and started on azithromycin.  Today is the last day of his antibiotic course.  States despite taking the medication, he feels worse.  He complains of midsternal chest pain, nonexertional and nonpleuritic, shortness of breath, general fatigue, and headache.  He has not taken any other medication for symptoms prior to arrival.  He denies fever, chills, night sweats, weakness, numbness, or tingling.

## 2024-09-13 NOTE — ED ADULT NURSE NOTE - NSICDXPASTMEDICALHX_GEN_ALL_CORE_FT
PAST MEDICAL HISTORY:  Asthma     Chronic sinusitis     Closed fracture of multiple ribs of right side, initial encounter     Cocaine abuse     GBS (Guillain Timberlake syndrome)     HIV (human immunodeficiency virus infection) from birth    HIV disease     Homeless

## 2024-09-13 NOTE — ED PROVIDER NOTE - NSFOLLOWUPINSTRUCTIONS_ED_ALL_ED_FT
You are seen in the emergency department for generalized malaise.  You had lab work performed which did not show any significant abnormalities.  A copy of the results is attached.  Your chest x-ray was also clear, continue your last day of antibiotics today.    Use the information given to you by social work for housing.    Rest, drink plenty of fluids.  Advance activity as tolerated.  Continue all previously prescribed medications as directed.  Follow up with your primary care physician in 48-72 hours- bring copies of your results.  Return to the ER for worsening or persistent symptoms, and/or ANY NEW OR CONCERNING SYMPTOMS. If you have issues obtaining follow up, please call: [phone] to obtain a doctor or specialist who takes your insurance in your area.

## 2024-09-13 NOTE — ED ADULT NURSE NOTE - OBJECTIVE STATEMENT
pt presents to ED w/ c/o chest pain that started this morning. Pt denies any radiation of pain, dizziness, or SOB. Described pain as dull underneath left breast. No acute distress noted. EKG completed on arrival and placed on cardiac monitor. Sinus tachycardia on arrival to 110s. Vital signs stable. HIV+. From [facility].

## 2024-09-13 NOTE — ED PROVIDER NOTE - NSICDXPASTMEDICALHX_GEN_ALL_CORE_FT
PAST MEDICAL HISTORY:  Asthma     Chronic sinusitis     Closed fracture of multiple ribs of right side, initial encounter     Cocaine abuse     GBS (Guillain Howard syndrome)     HIV (human immunodeficiency virus infection) from birth    HIV disease     Homeless

## 2024-09-13 NOTE — ED PROVIDER NOTE - PATIENT PORTAL LINK FT
You can access the FollowMyHealth Patient Portal offered by [facility] by registering at the following website: [email or web address]. By joining [employer]’s FollowMyHealth portal, you will also be able to view your health information using other applications (apps) compatible with our system.

## 2024-09-13 NOTE — ED PROVIDER NOTE - PROGRESS NOTE DETAILS
All labs personally reviewed.  Patient has a normal white blood cell count, labs otherwise nonactionable.  Chest x-ray without any acute focal consolidations.  Patient reassessed, states he feels relatively the same.  He is requesting to speak with social work regarding housing options.  Patient was given bus schedule and DSS emergency contact number by Char from social work.  Patient is stable for discharge.

## 2024-09-13 NOTE — ED PROVIDER NOTE - CLINICAL SUMMARY MEDICAL DECISION MAKING FREE TEXT BOX
34-year-old male presenting with general malaise with recent hospitalization for pneumonia, currently on last day of azithromycin.  Patient appears unwell but nontoxic, vitals within normal limits, afebrile.  Heart and lungs normal, no other focal findings on examination.  Will evaluate with blood work, chest x-ray, symptomatic treatment, reassess.

## 2024-09-13 NOTE — ED ADULT NURSE NOTE - NSFALLHARMRISKINTERV_ED_ALL_ED

## 2024-09-13 NOTE — ED PROVIDER NOTE - NSICDXFAMILYHX_GEN_ALL_CORE_FT
Substance abuse FAMILY HISTORY:  Mother  Still living? Unknown  FH: HIV infection, Age at diagnosis: Age Unknown

## 2024-09-13 NOTE — ED ADULT TRIAGE NOTE - CHIEF COMPLAINT QUOTE
Pt A&OX3, BIBEMS with c/o chest pain. EMS reports pt was at [facility] on Wednesday where he was diagnosed with pneumonia, discharged home on antibiotics. Pt reports he was starting to feel better and now his chest started to hurt and does not feel good.  EKG completed in triage.

## 2024-09-23 NOTE — PROGRESS NOTE ADULT - PROBLEM SELECTOR PROBLEM 6
Prophylactic measure Quality 226: Preventive Care And Screening: Tobacco Use: Screening And Cessation Intervention: Patient screened for tobacco use and is an ex/non-smoker Quality 47: Advance Care Plan: Advance Care Planning discussed and documented in the medical record; patient did not wish or was not able to name a surrogate decision maker or provide an advance care plan. Detail Level: Detailed Quality 130: Documentation Of Current Medications In The Medical Record: Current Medications Documented

## 2024-10-04 NOTE — ED PROVIDER NOTE - CCCP TRG CHIEF CMPLNT
[FreeTextEntry1] : Plan:  #Aortic Stenosis Moderate to Severe Needs echo, CTA PFT< Carotids RTO after testing to determine severity and plan of action Patient verbalized understanding  Patient seen and examined History and physical as per above Briefly, 60 year-old man with AS Evaluating for TAVR vs SAVR Additional testing and then RTC  I, Dr. Montemayor, saw, examined, and reviewed the diagnostic images with the patient and agree with my nurse practitioners clinic note, physical exam findings, and treatment plan.  Ray Montemayor MD chest heaviness, back pain

## 2024-10-20 ENCOUNTER — EMERGENCY (EMERGENCY)
Facility: HOSPITAL | Age: 34
LOS: 0 days | Discharge: HOME | End: 2024-10-21
Attending: STUDENT IN AN ORGANIZED HEALTH CARE EDUCATION/TRAINING PROGRAM
Payer: MEDICAID

## 2024-10-20 VITALS
OXYGEN SATURATION: 100 % | WEIGHT: 179.9 LBS | DIASTOLIC BLOOD PRESSURE: 88 MMHG | HEIGHT: 72 IN | TEMPERATURE: 98 F | SYSTOLIC BLOOD PRESSURE: 134 MMHG | HEART RATE: 91 BPM | RESPIRATION RATE: 18 BRPM

## 2024-10-20 DIAGNOSIS — R07.9 CHEST PAIN, UNSPECIFIED: ICD-10-CM

## 2024-10-20 DIAGNOSIS — Z98.890 OTHER SPECIFIED POSTPROCEDURAL STATES: Chronic | ICD-10-CM

## 2024-10-20 DIAGNOSIS — Z88.5 ALLERGY STATUS TO NARCOTIC AGENT: ICD-10-CM

## 2024-10-20 DIAGNOSIS — Z21 ASYMPTOMATIC HUMAN IMMUNODEFICIENCY VIRUS [HIV] INFECTION STATUS: ICD-10-CM

## 2024-10-20 DIAGNOSIS — Z88.1 ALLERGY STATUS TO OTHER ANTIBIOTIC AGENTS: ICD-10-CM

## 2024-10-20 DIAGNOSIS — J45.909 UNSPECIFIED ASTHMA, UNCOMPLICATED: ICD-10-CM

## 2024-10-20 DIAGNOSIS — G61.0 GUILLAIN-BARRE SYNDROME: ICD-10-CM

## 2024-10-20 LAB
BASOPHILS # BLD AUTO: 0.02 K/UL — SIGNIFICANT CHANGE UP (ref 0–0.2)
BASOPHILS NFR BLD AUTO: 0.4 % — SIGNIFICANT CHANGE UP (ref 0–2)
EOSINOPHIL # BLD AUTO: 0.16 K/UL — SIGNIFICANT CHANGE UP (ref 0–0.5)
EOSINOPHIL NFR BLD AUTO: 3.2 % — SIGNIFICANT CHANGE UP (ref 0–6)
HCT VFR BLD CALC: 43.5 % — SIGNIFICANT CHANGE UP (ref 39–50)
HGB BLD-MCNC: 14 G/DL — SIGNIFICANT CHANGE UP (ref 13–17)
IMM GRANULOCYTES NFR BLD AUTO: 0.2 % — SIGNIFICANT CHANGE UP (ref 0–0.9)
LYMPHOCYTES # BLD AUTO: 1.79 K/UL — SIGNIFICANT CHANGE UP (ref 1–3.3)
LYMPHOCYTES # BLD AUTO: 36.3 % — SIGNIFICANT CHANGE UP (ref 13–44)
MCHC RBC-ENTMCNC: 29.4 PG — SIGNIFICANT CHANGE UP (ref 27–34)
MCHC RBC-ENTMCNC: 32.2 GM/DL — SIGNIFICANT CHANGE UP (ref 32–36)
MCV RBC AUTO: 91.4 FL — SIGNIFICANT CHANGE UP (ref 80–100)
MONOCYTES # BLD AUTO: 0.49 K/UL — SIGNIFICANT CHANGE UP (ref 0–0.9)
MONOCYTES NFR BLD AUTO: 9.9 % — SIGNIFICANT CHANGE UP (ref 2–14)
NEUTROPHILS # BLD AUTO: 2.46 K/UL — SIGNIFICANT CHANGE UP (ref 1.8–7.4)
NEUTROPHILS NFR BLD AUTO: 50 % — SIGNIFICANT CHANGE UP (ref 43–77)
PLATELET # BLD AUTO: 238 K/UL — SIGNIFICANT CHANGE UP (ref 150–400)
RBC # BLD: 4.76 M/UL — SIGNIFICANT CHANGE UP (ref 4.2–5.8)
RBC # FLD: 12.9 % — SIGNIFICANT CHANGE UP (ref 10.3–14.5)
WBC # BLD: 4.93 K/UL — SIGNIFICANT CHANGE UP (ref 3.8–10.5)
WBC # FLD AUTO: 4.93 K/UL — SIGNIFICANT CHANGE UP (ref 3.8–10.5)

## 2024-10-20 PROCEDURE — 93005 ELECTROCARDIOGRAM TRACING: CPT

## 2024-10-20 PROCEDURE — 36415 COLL VENOUS BLD VENIPUNCTURE: CPT

## 2024-10-20 PROCEDURE — 99285 EMERGENCY DEPT VISIT HI MDM: CPT | Mod: 25

## 2024-10-20 PROCEDURE — 71045 X-RAY EXAM CHEST 1 VIEW: CPT | Mod: 26

## 2024-10-20 PROCEDURE — 85025 COMPLETE CBC W/AUTO DIFF WBC: CPT

## 2024-10-20 PROCEDURE — 71045 X-RAY EXAM CHEST 1 VIEW: CPT

## 2024-10-20 PROCEDURE — 83690 ASSAY OF LIPASE: CPT

## 2024-10-20 PROCEDURE — 84484 ASSAY OF TROPONIN QUANT: CPT

## 2024-10-20 PROCEDURE — 80053 COMPREHEN METABOLIC PANEL: CPT

## 2024-10-20 PROCEDURE — 93010 ELECTROCARDIOGRAM REPORT: CPT

## 2024-10-20 RX ORDER — ACETAMINOPHEN 325 MG
975 TABLET ORAL ONCE
Refills: 0 | Status: COMPLETED | OUTPATIENT
Start: 2024-10-20 | End: 2024-10-20

## 2024-10-20 NOTE — ED ADULT NURSE NOTE - OBJECTIVE STATEMENT
Pt is a 35 y/o male, alert and oriented x3, presenting to ED c/o chest pain. Pt reports, "I was riding my bike about 1.5 hours ago when I suddenly began to feel a sharp left-sided chest pain. The pain changes from a sharp pain to a 'sitting on chest' type of pain. 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 Pt placed on cardiac monitor in NSR.    +tobacco smoker.

## 2024-10-20 NOTE — ED ADULT NURSE REASSESSMENT NOTE - NS ED NURSE REASSESS COMMENT FT1
Pt reports 9/10 chest pain. Pt seeking pain medication. MD Arias made aware and states he will input pain medication orders.

## 2024-10-20 NOTE — ED ADULT TRIAGE NOTE - CHIEF COMPLAINT QUOTE
Pt presents from street complaining of chest pain onset 30 minutes ago after he was riding his bike. Hx of cardiac arrhthymias and congenital HIV. Endorses mild shortness of breath. EKG being completed in triage.

## 2024-10-20 NOTE — ED ADULT TRIAGE NOTE - [place] COMA SCALE: BEST VERBAL RESPONSE, MLM
Detail Level: Detailed
(V5) oriented
Quality 111:Pneumonia Vaccination Status For Older Adults: Pneumococcal Vaccination not Administered or Previously Received, Reason not Otherwise Specified
Quality 110: Preventive Care And Screening: Influenza Immunization: Influenza Immunization not Administered because Patient Refused.

## 2024-10-20 NOTE — ED ADULT NURSE NOTE - BREATHING, MLM
[FreeTextEntry1] : Acute lumbosacral sprain\par \par This patient will be treated with Robaxin and meloxicam.  If symptoms worsen she may return for cortisone injection.  She will return on a as needed basis.  There has been a discussion concerning her osteoporosis and continued use of calcium and vitamin D.\par \par Encounter time: 20 minutes Spontaneous, unlabored and symmetrical

## 2024-10-20 NOTE — ED ADULT NURSE NOTE - NSFALLRISKINTERV_ED_ALL_ED

## 2024-10-21 ENCOUNTER — TRANSCRIPTION ENCOUNTER (OUTPATIENT)
Age: 34
End: 2024-10-21

## 2024-10-21 VITALS
OXYGEN SATURATION: 100 % | SYSTOLIC BLOOD PRESSURE: 136 MMHG | RESPIRATION RATE: 14 BRPM | DIASTOLIC BLOOD PRESSURE: 77 MMHG | HEART RATE: 94 BPM | TEMPERATURE: 98 F

## 2024-10-21 LAB
ALBUMIN SERPL ELPH-MCNC: 3.6 G/DL — SIGNIFICANT CHANGE UP (ref 3.3–5)
ALP SERPL-CCNC: 75 U/L — SIGNIFICANT CHANGE UP (ref 40–120)
ALT FLD-CCNC: 27 U/L — SIGNIFICANT CHANGE UP (ref 12–78)
ANION GAP SERPL CALC-SCNC: 4 MMOL/L — LOW (ref 5–17)
AST SERPL-CCNC: 42 U/L — HIGH (ref 15–37)
BILIRUB SERPL-MCNC: 0.5 MG/DL — SIGNIFICANT CHANGE UP (ref 0.2–1.2)
BUN SERPL-MCNC: 16 MG/DL — SIGNIFICANT CHANGE UP (ref 7–23)
CALCIUM SERPL-MCNC: 9.5 MG/DL — SIGNIFICANT CHANGE UP (ref 8.5–10.1)
CHLORIDE SERPL-SCNC: 107 MMOL/L — SIGNIFICANT CHANGE UP (ref 96–108)
CO2 SERPL-SCNC: 30 MMOL/L — SIGNIFICANT CHANGE UP (ref 22–31)
CREAT SERPL-MCNC: 1.14 MG/DL — SIGNIFICANT CHANGE UP (ref 0.5–1.3)
EGFR: 86 ML/MIN/1.73M2 — SIGNIFICANT CHANGE UP
GLUCOSE SERPL-MCNC: 73 MG/DL — SIGNIFICANT CHANGE UP (ref 70–99)
LIDOCAIN IGE QN: 20 U/L — SIGNIFICANT CHANGE UP (ref 13–75)
POTASSIUM SERPL-MCNC: 4.7 MMOL/L — SIGNIFICANT CHANGE UP (ref 3.5–5.3)
POTASSIUM SERPL-SCNC: 4.7 MMOL/L — SIGNIFICANT CHANGE UP (ref 3.5–5.3)
PROT SERPL-MCNC: 8.4 GM/DL — HIGH (ref 6–8.3)
SODIUM SERPL-SCNC: 141 MMOL/L — SIGNIFICANT CHANGE UP (ref 135–145)
TROPONIN I, HIGH SENSITIVITY RESULT: 4.04 NG/L — SIGNIFICANT CHANGE UP
TROPONIN I, HIGH SENSITIVITY RESULT: 4.24 NG/L — SIGNIFICANT CHANGE UP

## 2024-10-21 RX ADMIN — Medication 975 MILLIGRAM(S): at 00:02

## 2024-10-21 NOTE — ED PROVIDER NOTE - OBJECTIVE STATEMENT
34-year-old male with history of congenital HIV infection, Guillain-Barré syndrome, asthma presents to the ER complaint of chest pain.  Patient states that he was riding his bike about 2 hours prior to arrival when the chest pain began.  States is located in the left side of his chest, nonradiating.  Although began during exertion states it is not exacerbated by exertion now.  No known exacerbating or alleviating factors.  Denies shortness of breath, cough, fever, chills, lower extremity edema, palpitations, syncope.

## 2024-10-21 NOTE — ED PROVIDER NOTE - NSFOLLOWUPINSTRUCTIONS_ED_ALL_ED_FT
Chest Pain    Chest pain can be caused by many different conditions which may or may not be dangerous. Causes include heartburn, lung infections, heart attack, blood clot in lungs, skin infections, strain or damage to muscle, cartilage, or bones, etc. In addition to a history and physical examination, an electrocardiogram (ECG) or other lab tests may have been performed to determine the cause of your chest pain. Follow up with your primary care provider or with a cardiologist as instructed.     SEEK IMMEDIATE MEDICAL CARE IF YOU HAVE ANY OF THE FOLLOWING SYMPTOMS: worsening chest pain, coughing up blood, unexplained back/neck/jaw pain, severe abdominal pain, dizziness or lightheadedness, fainting, shortness of breath, sweaty or clammy skin, vomiting, or racing heart beat. These symptoms may represent a serious problem that is an emergency. Do not wait to see if the symptoms will go away. Get medical help right away. Call 911 and do not drive yourself to the hospital.    1) Please follow-up with your primary care doctor in the next 5-7 days.  Please call tomorrow for an appointment.  If you cannot follow-up with your primary care doctor please return to the ED for any urgent issues.  2) You were given a copy of the tests performed today.  Please bring the results with you and review them with your primary care doctor.  3) If you have any worsening of symptoms or any other concerns please return to the ED immediately.  4) Please continue taking your home medications as directed.

## 2024-10-21 NOTE — ED PROVIDER NOTE - PATIENT PORTAL LINK FT
You can access the FollowMyHealth Patient Portal offered by [facility] by registering at the following website: [email or web address]. By joining [employer]’s FollowMyHealth portal, you will also be able to view your health information using other applications (apps) compatible with our system.

## 2024-10-21 NOTE — ED PROVIDER NOTE - CLINICAL SUMMARY MEDICAL DECISION MAKING FREE TEXT BOX
Patient presents to the ER complaining of chest pain.  Patient is well-appearing, vitally stable.  EKG at baseline.  Physical exam normal including lung exam.  No wheezes, rales, rhonchi despite asthma history.  No fever or chills.  Chest x-ray with no acute findings.  Labs nonactionable. Advised PCP and cardiology f/u. States he has a cardiologist to follow up with . Ambulating independently in ED. Tolerating PO. Stable for dc. Patient verbalizes understanding of return precautions and f/u.

## 2024-11-11 ENCOUNTER — TRANSCRIPTION ENCOUNTER (OUTPATIENT)
Age: 34
End: 2024-11-11

## 2024-11-11 NOTE — ED PROVIDER NOTE - DOMESTIC TRAVEL HIGH RISK QUESTION
[FreeTextEntry3] :     Saw and examined patient; the above is an accurate documentation of my words and actions.   Brooke Sims MD [facility] Pediatric Orthopedic Surgery    No

## 2024-11-21 ENCOUNTER — EMERGENCY (EMERGENCY)
Facility: HOSPITAL | Age: 34
LOS: 1 days | Discharge: HOME | End: 2024-11-21
Attending: EMERGENCY MEDICINE
Payer: COMMERCIAL

## 2024-11-21 VITALS
DIASTOLIC BLOOD PRESSURE: 89 MMHG | TEMPERATURE: 98 F | WEIGHT: 190.04 LBS | SYSTOLIC BLOOD PRESSURE: 134 MMHG | HEIGHT: 72 IN | OXYGEN SATURATION: 98 % | HEART RATE: 79 BPM | RESPIRATION RATE: 18 BRPM

## 2024-11-21 DIAGNOSIS — Z98.890 OTHER SPECIFIED POSTPROCEDURAL STATES: Chronic | ICD-10-CM

## 2024-11-21 PROCEDURE — 99285 EMERGENCY DEPT VISIT HI MDM: CPT | Mod: 25

## 2024-11-21 NOTE — ED ADULT NURSE NOTE - OBJECTIVE STATEMENT
pt BIBA for trip and fall forward caught self with hands with sharp low-mid back pain, pt reports 3rd fall today history of Guillian-[place] was just D/C'd from [place] 2 days ago. Pt denies head strike LOC ETOH or AC

## 2024-11-21 NOTE — ED ADULT NURSE NOTE - CHIEF COMPLAINT QUOTE
pt BIBA for trip and fall forward caught self with hands with sharp low-mid back pain, pt reports 3rd fall today history of Guillian-[place] was just D/C'd from [place] 2 days ago. Pt denies head strike LOC ETOH or AC

## 2024-11-21 NOTE — ED ADULT TRIAGE NOTE - CHIEF COMPLAINT QUOTE
pt BIBA for trip and fall forward caught self with hands with sharp low-mid back pain, pt reports 3rd fall today history of Guillian-[place] was just D/C'd from [place] 2 days ago. Pt denies head strike LOC ETOH or AC

## 2024-11-22 VITALS
SYSTOLIC BLOOD PRESSURE: 123 MMHG | DIASTOLIC BLOOD PRESSURE: 75 MMHG | OXYGEN SATURATION: 98 % | HEART RATE: 74 BPM | RESPIRATION RATE: 18 BRPM | TEMPERATURE: 98 F

## 2024-11-22 LAB
ALBUMIN SERPL ELPH-MCNC: 4 G/DL — SIGNIFICANT CHANGE UP (ref 3.3–5.2)
ALP SERPL-CCNC: 61 U/L — SIGNIFICANT CHANGE UP (ref 40–120)
ALT FLD-CCNC: 19 U/L — SIGNIFICANT CHANGE UP
ANION GAP SERPL CALC-SCNC: 13 MMOL/L — SIGNIFICANT CHANGE UP (ref 5–17)
AST SERPL-CCNC: 25 U/L — SIGNIFICANT CHANGE UP
BASOPHILS # BLD AUTO: 0.01 K/UL — SIGNIFICANT CHANGE UP (ref 0–0.2)
BASOPHILS NFR BLD AUTO: 0.1 % — SIGNIFICANT CHANGE UP (ref 0–2)
BILIRUB SERPL-MCNC: 0.5 MG/DL — SIGNIFICANT CHANGE UP (ref 0.4–2)
BUN SERPL-MCNC: 13.5 MG/DL — SIGNIFICANT CHANGE UP (ref 8–20)
CALCIUM SERPL-MCNC: 9.2 MG/DL — SIGNIFICANT CHANGE UP (ref 8.4–10.5)
CHLORIDE SERPL-SCNC: 101 MMOL/L — SIGNIFICANT CHANGE UP (ref 96–108)
CO2 SERPL-SCNC: 24 MMOL/L — SIGNIFICANT CHANGE UP (ref 22–29)
CREAT SERPL-MCNC: 0.74 MG/DL — SIGNIFICANT CHANGE UP (ref 0.5–1.3)
EGFR: 121 ML/MIN/1.73M2 — SIGNIFICANT CHANGE UP
EOSINOPHIL # BLD AUTO: 0.1 K/UL — SIGNIFICANT CHANGE UP (ref 0–0.5)
EOSINOPHIL NFR BLD AUTO: 1.5 % — SIGNIFICANT CHANGE UP (ref 0–6)
GLUCOSE SERPL-MCNC: 89 MG/DL — SIGNIFICANT CHANGE UP (ref 70–99)
HCT VFR BLD CALC: 37.3 % — LOW (ref 39–50)
HGB BLD-MCNC: 12.5 G/DL — LOW (ref 13–17)
IMM GRANULOCYTES NFR BLD AUTO: 0.1 % — SIGNIFICANT CHANGE UP (ref 0–0.9)
LYMPHOCYTES # BLD AUTO: 3.41 K/UL — HIGH (ref 1–3.3)
LYMPHOCYTES # BLD AUTO: 50.7 % — HIGH (ref 13–44)
MCHC RBC-ENTMCNC: 28.9 PG — SIGNIFICANT CHANGE UP (ref 27–34)
MCHC RBC-ENTMCNC: 33.5 G/DL — SIGNIFICANT CHANGE UP (ref 32–36)
MCV RBC AUTO: 86.1 FL — SIGNIFICANT CHANGE UP (ref 80–100)
MONOCYTES # BLD AUTO: 0.99 K/UL — HIGH (ref 0–0.9)
MONOCYTES NFR BLD AUTO: 14.7 % — HIGH (ref 2–14)
NEUTROPHILS # BLD AUTO: 2.2 K/UL — SIGNIFICANT CHANGE UP (ref 1.8–7.4)
NEUTROPHILS NFR BLD AUTO: 32.9 % — LOW (ref 43–77)
PLATELET # BLD AUTO: 239 K/UL — SIGNIFICANT CHANGE UP (ref 150–400)
POTASSIUM SERPL-MCNC: 4 MMOL/L — SIGNIFICANT CHANGE UP (ref 3.5–5.3)
POTASSIUM SERPL-SCNC: 4 MMOL/L — SIGNIFICANT CHANGE UP (ref 3.5–5.3)
PROT SERPL-MCNC: 7.4 G/DL — SIGNIFICANT CHANGE UP (ref 6.6–8.7)
RBC # BLD: 4.33 M/UL — SIGNIFICANT CHANGE UP (ref 4.2–5.8)
RBC # FLD: 12.9 % — SIGNIFICANT CHANGE UP (ref 10.3–14.5)
SODIUM SERPL-SCNC: 138 MMOL/L — SIGNIFICANT CHANGE UP (ref 135–145)
WBC # BLD: 6.72 K/UL — SIGNIFICANT CHANGE UP (ref 3.8–10.5)
WBC # FLD AUTO: 6.72 K/UL — SIGNIFICANT CHANGE UP (ref 3.8–10.5)

## 2024-11-22 PROCEDURE — 72128 CT CHEST SPINE W/O DYE: CPT | Mod: MC

## 2024-11-22 PROCEDURE — 85025 COMPLETE CBC W/AUTO DIFF WBC: CPT

## 2024-11-22 PROCEDURE — 72131 CT LUMBAR SPINE W/O DYE: CPT | Mod: 26,MC

## 2024-11-22 PROCEDURE — 36415 COLL VENOUS BLD VENIPUNCTURE: CPT

## 2024-11-22 PROCEDURE — 99284 EMERGENCY DEPT VISIT MOD MDM: CPT | Mod: 25

## 2024-11-22 PROCEDURE — 72131 CT LUMBAR SPINE W/O DYE: CPT | Mod: MC

## 2024-11-22 PROCEDURE — 72128 CT CHEST SPINE W/O DYE: CPT | Mod: 26,MC

## 2024-11-22 PROCEDURE — 80053 COMPREHEN METABOLIC PANEL: CPT

## 2024-11-22 PROCEDURE — 96374 THER/PROPH/DIAG INJ IV PUSH: CPT

## 2024-11-22 RX ORDER — MORPHINE SULFATE 30 MG/1
4 TABLET, EXTENDED RELEASE ORAL ONCE
Refills: 0 | Status: DISCONTINUED | OUTPATIENT
Start: 2024-11-22 | End: 2024-11-22

## 2024-11-22 RX ORDER — MELOXICAM 7.5 MG
1 TABLET ORAL
Qty: 15 | Refills: 0
Start: 2024-11-22 | End: 2024-12-06

## 2024-11-22 RX ORDER — METHOCARBAMOL 500 MG/1
2 TABLET ORAL
Qty: 42 | Refills: 0
Start: 2024-11-22 | End: 2024-11-28

## 2024-11-22 RX ORDER — OXYCODONE AND ACETAMINOPHEN 7.5; 325 MG/1; MG/1
1 TABLET ORAL
Qty: 15 | Refills: 0
Start: 2024-11-22 | End: 2024-11-26

## 2024-11-22 RX ORDER — METHYLPREDNISOLONE ACETATE 80 MG/ML
1 INJECTION, SUSPENSION INTRALESIONAL; INTRAMUSCULAR; INTRASYNOVIAL; SOFT TISSUE
Qty: 1 | Refills: 0
Start: 2024-11-22

## 2024-11-22 RX ORDER — LIDOCAINE HYDROCHLORIDE 40 MG/ML
1 SOLUTION TOPICAL ONCE
Refills: 0 | Status: COMPLETED | OUTPATIENT
Start: 2024-11-22 | End: 2024-11-22

## 2024-11-22 RX ORDER — GABAPENTIN 300 MG/1
300 CAPSULE ORAL ONCE
Refills: 0 | Status: COMPLETED | OUTPATIENT
Start: 2024-11-22 | End: 2024-11-22

## 2024-11-22 RX ORDER — METHOCARBAMOL 500 MG/1
1500 TABLET ORAL ONCE
Refills: 0 | Status: COMPLETED | OUTPATIENT
Start: 2024-11-22 | End: 2024-11-22

## 2024-11-22 RX ADMIN — GABAPENTIN 300 MILLIGRAM(S): 300 CAPSULE ORAL at 00:29

## 2024-11-22 RX ADMIN — METHOCARBAMOL 1500 MILLIGRAM(S): 500 TABLET ORAL at 00:29

## 2024-11-22 RX ADMIN — LIDOCAINE HYDROCHLORIDE 1 PATCH: 40 SOLUTION TOPICAL at 03:02

## 2024-11-22 RX ADMIN — MORPHINE SULFATE 4 MILLIGRAM(S): 30 TABLET, EXTENDED RELEASE ORAL at 03:03

## 2024-11-22 RX ADMIN — Medication 60 MILLIGRAM(S): at 03:03

## 2024-11-22 NOTE — ED PROVIDER NOTE - PATIENT PORTAL LINK FT
You can access the FollowMyHealth Patient Portal offered by [facility] by registering at the following website: [email or web address]. By joining [employer]’s FollowMyHealth portal, you will also be able to view your health information using other applications (apps) compatible with our system.

## 2024-11-22 NOTE — ED PROVIDER NOTE - CLINICAL SUMMARY MEDICAL DECISION MAKING FREE TEXT BOX
acute on chronic back pain, at neurologic baseline and no sign of GBS, imaging negative, feels better, stable for discharge

## 2024-11-22 NOTE — ED PROVIDER NOTE - OBJECTIVE STATEMENT
34-year-old male past with history of Guillain-Barré syndrome with chronic leg weakness,  chronic back pain presents with fall.  Patient reports he had a trip and fall which exacerbated his chronic back pain.  No numbness, tingling, weakness to the lower extremities. pt reports his leg strength is at baseline, no Sx consistent with GBS (single episode in 2018)

## 2024-12-10 NOTE — ED ADULT NURSE NOTE - CAS DISCH BELONGINGS RETURNED
Benzoyl Peroxide Pregnancy And Lactation Text: This medication is Pregnancy Category C. It is unknown if benzoyl peroxide is excreted in breast milk. Spironolactone Counseling: Patient advised regarding risks of diarrhea, abdominal pain, hyperkalemia, birth defects (for female patients), liver toxicity and renal toxicity. The patient may need blood work to monitor liver and kidney function and potassium levels while on therapy. The patient verbalized understanding of the proper use and possible adverse effects of spironolactone.  All of the patient's questions and concerns were addressed. Topical Retinoid Pregnancy And Lactation Text: This medication is Pregnancy Category C. It is unknown if this medication is excreted in breast milk. High Dose Vitamin A Counseling: Side effects reviewed, pt to contact office should one occur. Topical Sulfur Applications Pregnancy And Lactation Text: This medication is Pregnancy Category C and has an unknown safety profile during pregnancy. It is unknown if this topical medication is excreted in breast milk. Detail Level: Simple Erythromycin Pregnancy And Lactation Text: This medication is Pregnancy Category B and is considered safe during pregnancy. It is also excreted in breast milk. Aklief Pregnancy And Lactation Text: It is unknown if this medication is safe to use during pregnancy.  It is unknown if this medication is excreted in breast milk.  Breastfeeding women should use the topical cream on the smallest area of the skin for the shortest time needed while breastfeeding.  Do not apply to nipple and areola. Azelaic Acid Pregnancy And Lactation Text: This medication is considered safe during pregnancy and breast feeding. High Dose Vitamin A Pregnancy And Lactation Text: High dose vitamin A therapy is contraindicated during pregnancy and breast feeding. Spironolactone Pregnancy And Lactation Text: This medication can cause feminization of the male fetus and should be avoided during pregnancy. The active metabolite is also found in breast milk. Topical Clindamycin Pregnancy And Lactation Text: This medication is Pregnancy Category B and is considered safe during pregnancy. It is unknown if it is excreted in breast milk. Benzoyl Peroxide Counseling: Patient counseled that medicine may cause skin irritation and bleach clothing.  In the event of skin irritation, the patient was advised to reduce the amount of the drug applied or use it less frequently.   The patient verbalized understanding of the proper use and possible adverse effects of benzoyl peroxide.  All of the patient's questions and concerns were addressed. Sarecycline Pregnancy And Lactation Text: This medication is Pregnancy Category D and not consider safe during pregnancy. It is also excreted in breast milk. Tazorac Counseling:  Patient advised that medication is irritating and drying.  Patient may need to apply sparingly and wash off after an hour before eventually leaving it on overnight.  The patient verbalized understanding of the proper use and possible adverse effects of tazorac.  All of the patient's questions and concerns were addressed. Topical Clindamycin Counseling: Patient counseled that this medication may cause skin irritation or allergic reactions.  In the event of skin irritation, the patient was advised to reduce the amount of the drug applied or use it less frequently.   The patient verbalized understanding of the proper use and possible adverse effects of clindamycin.  All of the patient's questions and concerns were addressed. Bactrim Counseling:  I discussed with the patient the risks of sulfa antibiotics including but not limited to GI upset, allergic reaction, drug rash, diarrhea, dizziness, photosensitivity, and yeast infections.  Rarely, more serious reactions can occur including but not limited to aplastic anemia, agranulocytosis, methemoglobinemia, blood dyscrasias, liver or kidney failure, lung infiltrates or desquamative/blistering drug rashes. Winlevi Counseling:  I discussed with the patient the risks of topical clascoterone including but not limited to erythema, scaling, itching, and stinging. Patient voiced their understanding. Aklief counseling:  Patient advised to apply a pea-sized amount only at bedtime and wait 30 minutes after washing their face before applying.  If too drying, patient may add a non-comedogenic moisturizer.  The most commonly reported side effects including irritation, redness, scaling, dryness, stinging, burning, itching, and increased risk of sunburn.  The patient verbalized understanding of the proper use and possible adverse effects of retinoids.  All of the patient's questions and concerns were addressed. Isotretinoin Pregnancy And Lactation Text: This medication is Pregnancy Category X and is considered extremely dangerous during pregnancy. It is unknown if it is excreted in breast milk. Birth Control Pills Counseling: Birth Control Pill Counseling: I discussed with the patient the potential side effects of OCPs including but not limited to increased risk of stroke, heart attack, thrombophlebitis, deep venous thrombosis, hepatic adenomas, breast changes, GI upset, headaches, and depression.  The patient verbalized understanding of the proper use and possible adverse effects of OCPs. All of the patient's questions and concerns were addressed. Include Pregnancy/Lactation Warning?: No Doxycycline Pregnancy And Lactation Text: This medication is Pregnancy Category D and not consider safe during pregnancy. It is also excreted in breast milk but is considered safe for shorter treatment courses. Erythromycin Counseling:  I discussed with the patient the risks of erythromycin including but not limited to GI upset, allergic reaction, drug rash, diarrhea, increase in liver enzymes, and yeast infections. Tazorac Pregnancy And Lactation Text: This medication is not safe during pregnancy. It is unknown if this medication is excreted in breast milk. Azelaic Acid Counseling: Patient counseled that medicine may cause skin irritation and to avoid applying near the eyes.  In the event of skin irritation, the patient was advised to reduce the amount of the drug applied or use it less frequently.   The patient verbalized understanding of the proper use and possible adverse effects of azelaic acid.  All of the patient's questions and concerns were addressed. Bactrim Pregnancy And Lactation Text: This medication is Pregnancy Category D and is known to cause fetal risk.  It is also excreted in breast milk. Sarecycline Counseling: Patient advised regarding possible photosensitivity and discoloration of the teeth, skin, lips, tongue and gums.  Patient instructed to avoid sunlight, if possible.  When exposed to sunlight, patients should wear protective clothing, sunglasses, and sunscreen.  The patient was instructed to call the office immediately if the following severe adverse effects occur:  hearing changes, easy bruising/bleeding, severe headache, or vision changes.  The patient verbalized understanding of the proper use and possible adverse effects of sarecycline.  All of the patient's questions and concerns were addressed. Azithromycin Counseling:  I discussed with the patient the risks of azithromycin including but not limited to GI upset, allergic reaction, drug rash, diarrhea, and yeast infections. Topical Retinoid counseling:  Patient advised to apply a pea-sized amount only at bedtime and wait 30 minutes after washing their face before applying.  If too drying, patient may add a non-comedogenic moisturizer. The patient verbalized understanding of the proper use and possible adverse effects of retinoids.  All of the patient's questions and concerns were addressed. Topical Sulfur Applications Counseling: Topical Sulfur Counseling: Patient counseled that this medication may cause skin irritation or allergic reactions.  In the event of skin irritation, the patient was advised to reduce the amount of the drug applied or use it less frequently.   The patient verbalized understanding of the proper use and possible adverse effects of topical sulfur application.  All of the patient's questions and concerns were addressed. Isotretinoin Counseling: Patient should get monthly blood tests, not donate blood, not drive at night if vision affected, not share medication, and not undergo elective surgery for 6 months after tx completed. Side effects reviewed, pt to contact office should one occur. Tetracycline Counseling: Patient counseled regarding possible photosensitivity and increased risk for sunburn.  Patient instructed to avoid sunlight, if possible.  When exposed to sunlight, patients should wear protective clothing, sunglasses, and sunscreen.  The patient was instructed to call the office immediately if the following severe adverse effects occur:  hearing changes, easy bruising/bleeding, severe headache, or vision changes.  The patient verbalized understanding of the proper use and possible adverse effects of tetracycline.  All of the patient's questions and concerns were addressed. Patient understands to avoid pregnancy while on therapy due to potential birth defects. Dapsone Counseling: I discussed with the patient the risks of dapsone including but not limited to hemolytic anemia, agranulocytosis, rashes, methemoglobinemia, kidney failure, peripheral neuropathy, headaches, GI upset, and liver toxicity.  Patients who start dapsone require monitoring including baseline LFTs and weekly CBCs for the first month, then every month thereafter.  The patient verbalized understanding of the proper use and possible adverse effects of dapsone.  All of the patient's questions and concerns were addressed. Dapsone Pregnancy And Lactation Text: This medication is Pregnancy Category C and is not considered safe during pregnancy or breast feeding. Minocycline Counseling: Patient advised regarding possible photosensitivity and discoloration of the teeth, skin, lips, tongue and gums.  Patient instructed to avoid sunlight, if possible.  When exposed to sunlight, patients should wear protective clothing, sunglasses, and sunscreen.  The patient was instructed to call the office immediately if the following severe adverse effects occur:  hearing changes, easy bruising/bleeding, severe headache, or vision changes.  The patient verbalized understanding of the proper use and possible adverse effects of minocycline.  All of the patient's questions and concerns were addressed. Birth Control Pills Pregnancy And Lactation Text: This medication should be avoided if pregnant and for the first 30 days post-partum. Winlevi Pregnancy And Lactation Text: This medication is considered safe during pregnancy and breastfeeding. Doxycycline Counseling:  Patient counseled regarding possible photosensitivity and increased risk for sunburn.  Patient instructed to avoid sunlight, if possible.  When exposed to sunlight, patients should wear protective clothing, sunglasses, and sunscreen.  The patient was instructed to call the office immediately if the following severe adverse effects occur:  hearing changes, easy bruising/bleeding, severe headache, or vision changes.  The patient verbalized understanding of the proper use and possible adverse effects of doxycycline.  All of the patient's questions and concerns were addressed. Azithromycin Pregnancy And Lactation Text: This medication is considered safe during pregnancy and is also secreted in breast milk. Not applicable

## 2024-12-10 NOTE — ED PROVIDER NOTE - IV ALTEPLASE ADMIN OUTSIDE HIDDEN
X-rays reviewed no acute abnormality noted, awaiting official read.   Hinged knee brace applied in office.   Please begin Voltaren gel as directed.   Continue rest, ice, compression and elevation for pain and swelling.   Follow up with PCP or orthopedics if no relief within one week.   
show

## 2024-12-16 NOTE — PHYSICAL THERAPY INITIAL EVALUATION ADULT - MANUAL MUSCLE TESTING RESULTS, REHAB EVAL
16-Dec-2024
bilateral UE 5/5; bilateral LE 1+/5 noted in bilateral quads, right greater than left; 1+/5 bilateral ankle df/pf

## 2024-12-17 NOTE — ED ADULT TRIAGE NOTE - LOCATION:
Patient initially presented to [facility] with complaints of nausea and left-sided abdominal pain radiating to back. Has history of recurrent alcoholic pancreatitis.  Recent hospitalization (11/23 - 11/29) during which time was managed conservatively.  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  General surgery consulted at [facility].  Recommended conservative management.  GI consulted at [facility]. Recommended transfer to [facility] for cyst gastrostomy and necrosectomy with advance GI team.    Plan:  Patient currently tolerating clear liquid diet.  N.p.o. at midnight on 12/17 fGI intervention  GI consulted.  Appreciate recommendation.  Pain regimen increased to dilaudid 0.5 mg Q3H PRN mod pain, dilaudid 1.0 mg Q3H PRN for severe pain, dilaudid 0.5 mg Q3H PRN breakthrough  Bowel regimen in place  Patient placed back on CIWA to monitor her off of Librium  IV hydration  ml/hr, nausea medication as needed.   Right arm;

## 2025-01-06 ENCOUNTER — TRANSCRIPTION ENCOUNTER (OUTPATIENT)
Age: 35
End: 2025-01-06

## 2025-01-09 NOTE — OCCUPATIONAL THERAPY INITIAL EVALUATION ADULT - VISUAL ASSESSMENT: DEPTH PERCEPTION
Peripheral Block    Patient location during procedure: OR  Start time: 1/9/2025 10:50 AM  End time: 1/9/2025 11:10 AM  Reason for block: at surgeon's request and post-op pain management  Staffing  Performed: attending   Authorized by: Jessy Coreas MD    Performed by: Genoveva Gambino MD  Preanesthetic Checklist  Completed: patient identified, IV checked, site marked, risks and benefits discussed, surgical consent, monitors and equipment checked, pre-op evaluation and timeout performed   Timeout performed at: 1/9/2025 10:45 AM  Peripheral Block  Patient position: laying flat  Prep: ChloraPrep  Patient monitoring: heart rate, continuous pulse ox and cardiac monitor  Block type: QL  Laterality: B/L  Injection technique: single-shot  Guidance: ultrasound guided  Infiltration strength: 0.5 %  Dose: 30 mL  Needle  Needle type: short-bevel   Needle gauge: 22 G  Needle length: 8 cm  Needle localization: ultrasound guidance     image stored in chart  Assessment  Injection assessment: negative aspiration for heme, no paresthesia on injection, incremental injection and local visualized surrounding nerve on ultrasound  Heart rate change: no  Slow fractionated injection: yes  Additional Notes  QL single shot. informed consent obtained. risks and benefits discussed. ASA monitors placed and timeout performed. Pt positioned, prepped with chlorhexidine, draped with sterile towels.     Ultrasound guidance used with visualization of the needle throughout duration of the procedure. Aspiration was negative. A total of ropi 0.5 % ml, dexamethasone 4mg, epinephrine 1/200,000 was divided and injected on left and right side evenly                  mild impairment

## 2025-01-13 ENCOUNTER — INPATIENT (INPATIENT)
Facility: HOSPITAL | Age: 35
LOS: 6 days | Discharge: HOME | DRG: 351 | End: 2025-01-20
Attending: INTERNAL MEDICINE | Admitting: INTERNAL MEDICINE
Payer: MEDICAID

## 2025-01-13 VITALS
RESPIRATION RATE: 18 BRPM | WEIGHT: 190.04 LBS | DIASTOLIC BLOOD PRESSURE: 86 MMHG | OXYGEN SATURATION: 100 % | TEMPERATURE: 98 F | HEIGHT: 72 IN | HEART RATE: 94 BPM | SYSTOLIC BLOOD PRESSURE: 144 MMHG

## 2025-01-13 DIAGNOSIS — Z98.890 OTHER SPECIFIED POSTPROCEDURAL STATES: Chronic | ICD-10-CM

## 2025-01-13 LAB
ALBUMIN SERPL ELPH-MCNC: 3.7 G/DL — SIGNIFICANT CHANGE UP (ref 3.3–5)
ALP SERPL-CCNC: 69 U/L — SIGNIFICANT CHANGE UP (ref 40–120)
ALT FLD-CCNC: 29 U/L — SIGNIFICANT CHANGE UP (ref 12–78)
ANION GAP SERPL CALC-SCNC: 8 MMOL/L — SIGNIFICANT CHANGE UP (ref 5–17)
AST SERPL-CCNC: 47 U/L — HIGH (ref 15–37)
BASOPHILS # BLD AUTO: 0.02 K/UL — SIGNIFICANT CHANGE UP (ref 0–0.2)
BASOPHILS NFR BLD AUTO: 0.3 % — SIGNIFICANT CHANGE UP (ref 0–2)
BILIRUB SERPL-MCNC: 0.8 MG/DL — SIGNIFICANT CHANGE UP (ref 0.2–1.2)
BUN SERPL-MCNC: 19 MG/DL — SIGNIFICANT CHANGE UP (ref 7–23)
CALCIUM SERPL-MCNC: 9.7 MG/DL — SIGNIFICANT CHANGE UP (ref 8.5–10.1)
CHLORIDE SERPL-SCNC: 106 MMOL/L — SIGNIFICANT CHANGE UP (ref 96–108)
CO2 SERPL-SCNC: 27 MMOL/L — SIGNIFICANT CHANGE UP (ref 22–31)
CREAT SERPL-MCNC: 0.71 MG/DL — SIGNIFICANT CHANGE UP (ref 0.5–1.3)
EGFR: 123 ML/MIN/1.73M2 — SIGNIFICANT CHANGE UP
EOSINOPHIL # BLD AUTO: 0.02 K/UL — SIGNIFICANT CHANGE UP (ref 0–0.5)
EOSINOPHIL NFR BLD AUTO: 0.3 % — SIGNIFICANT CHANGE UP (ref 0–6)
GLUCOSE SERPL-MCNC: 75 MG/DL — SIGNIFICANT CHANGE UP (ref 70–99)
HCT VFR BLD CALC: 38.2 % — LOW (ref 39–50)
HGB BLD-MCNC: 12.5 G/DL — LOW (ref 13–17)
IMM GRANULOCYTES NFR BLD AUTO: 0.3 % — SIGNIFICANT CHANGE UP (ref 0–0.9)
LYMPHOCYTES # BLD AUTO: 1.84 K/UL — SIGNIFICANT CHANGE UP (ref 1–3.3)
LYMPHOCYTES # BLD AUTO: 24.6 % — SIGNIFICANT CHANGE UP (ref 13–44)
MCHC RBC-ENTMCNC: 29.9 PG — SIGNIFICANT CHANGE UP (ref 27–34)
MCHC RBC-ENTMCNC: 32.7 G/DL — SIGNIFICANT CHANGE UP (ref 32–36)
MCV RBC AUTO: 91.4 FL — SIGNIFICANT CHANGE UP (ref 80–100)
MONOCYTES # BLD AUTO: 0.77 K/UL — SIGNIFICANT CHANGE UP (ref 0–0.9)
MONOCYTES NFR BLD AUTO: 10.3 % — SIGNIFICANT CHANGE UP (ref 2–14)
NEUTROPHILS # BLD AUTO: 4.81 K/UL — SIGNIFICANT CHANGE UP (ref 1.8–7.4)
NEUTROPHILS NFR BLD AUTO: 64.2 % — SIGNIFICANT CHANGE UP (ref 43–77)
PLATELET # BLD AUTO: 275 K/UL — SIGNIFICANT CHANGE UP (ref 150–400)
POTASSIUM SERPL-MCNC: 3.6 MMOL/L — SIGNIFICANT CHANGE UP (ref 3.5–5.3)
POTASSIUM SERPL-SCNC: 3.6 MMOL/L — SIGNIFICANT CHANGE UP (ref 3.5–5.3)
PROT SERPL-MCNC: 8.1 GM/DL — SIGNIFICANT CHANGE UP (ref 6–8.3)
RBC # BLD: 4.18 M/UL — LOW (ref 4.2–5.8)
RBC # FLD: 13 % — SIGNIFICANT CHANGE UP (ref 10.3–14.5)
SODIUM SERPL-SCNC: 141 MMOL/L — SIGNIFICANT CHANGE UP (ref 135–145)
WBC # BLD: 7.48 K/UL — SIGNIFICANT CHANGE UP (ref 3.8–10.5)
WBC # FLD AUTO: 7.48 K/UL — SIGNIFICANT CHANGE UP (ref 3.8–10.5)

## 2025-01-13 PROCEDURE — 99285 EMERGENCY DEPT VISIT HI MDM: CPT

## 2025-01-13 NOTE — ED ADULT NURSE NOTE - NSFALLUNIVINTERV_ED_ALL_ED
Bed/Stretcher in lowest position, wheels locked, appropriate side rails in place/Call bell, personal items and telephone in reach/Instruct patient to call for assistance before getting out of bed/chair/stretcher/Non-slip footwear applied when patient is off stretcher/[place] to call system/Physically safe environment - no spills, clutter or unnecessary equipment/Purposeful proactive rounding/Room/bathroom lighting operational, light cord in reach

## 2025-01-13 NOTE — ED ADULT NURSE NOTE - OBJECTIVE STATEMENT
34y male presents to the ED c.o flu-like symptoms. Pt reports experiencing generalized weakness for the past 5 days, endorses body aches and cough. Denies chest pain, SOB, fevers or chills. Congenital HIV positive.

## 2025-01-13 NOTE — ED STATDOCS - PROGRESS NOTE DETAILS
34-year-old male past medical history of HIV on Biktarvy, who presents with chief complaint of flulike symptoms.  States that for the past several days has been having sneezing, nasal congestion, cough, body aches, fatigue, generalized weakness.  Patient states that he is fallen at least 10 times in the last several days.  Said that he normally does not ambulate with assistance.  Has a history of Guillain-Barré syndrome, with some residual weakness in the left lower extremities.  Patient states that he is not sharing, cannot take care of himself, and is asking for placement into a nursing home.  Patient to be symptomatic further evaluation management. 34-year-old male past medical history of HIV on Biktarvy, who presents with chief complaint of flulike symptoms.  States that for the past several days has been having sneezing, nasal congestion, cough, body aches, fatigue, generalized weakness.  Patient states that he is fallen at least 10 times in the last several days.  Said that he normally does not ambulate with assistance.  Has a history of Guillain-Barré syndrome, with some residual weakness in the left lower extremities.  Patient states that he is not showering cannot take care of himself, and is asking for placement into a nursing home.  Patient to be symptomatic further evaluation management.

## 2025-01-13 NOTE — ED ADULT TRIAGE NOTE - CHIEF COMPLAINT QUOTE
[place]. Endorses coughing, sneezing, generalized weakness x4 days. Denies fever. HX of HIV.

## 2025-01-14 ENCOUNTER — TRANSCRIPTION ENCOUNTER (OUTPATIENT)
Age: 35
End: 2025-01-14

## 2025-01-14 DIAGNOSIS — R53.1 WEAKNESS: ICD-10-CM

## 2025-01-14 LAB
ADD ON TEST-SPECIMEN IN LAB: SIGNIFICANT CHANGE UP
CK SERPL-CCNC: 427 U/L — HIGH (ref 26–308)
CK SERPL-CCNC: 753 U/L — HIGH (ref 26–308)
CRP SERPL-MCNC: 13.8 MG/ML — HIGH (ref 0–5)
ERYTHROCYTE [SEDIMENTATION RATE] IN BLOOD: 43 MM/HR — HIGH (ref 0–15)
FLUAV AG NPH QL: SIGNIFICANT CHANGE UP
FLUBV AG NPH QL: SIGNIFICANT CHANGE UP
RSV RNA NPH QL NAA+NON-PROBE: SIGNIFICANT CHANGE UP
SARS-COV-2 RNA SPEC QL NAA+PROBE: SIGNIFICANT CHANGE UP
TSH SERPL-MCNC: 0.31 UU/ML — LOW (ref 0.34–4.82)

## 2025-01-14 PROCEDURE — 84443 ASSAY THYROID STIM HORMONE: CPT

## 2025-01-14 PROCEDURE — 85025 COMPLETE CBC W/AUTO DIFF WBC: CPT

## 2025-01-14 PROCEDURE — 82607 VITAMIN B-12: CPT

## 2025-01-14 PROCEDURE — 71045 X-RAY EXAM CHEST 1 VIEW: CPT | Mod: 26

## 2025-01-14 PROCEDURE — 85027 COMPLETE CBC AUTOMATED: CPT

## 2025-01-14 PROCEDURE — 86359 T CELLS TOTAL COUNT: CPT

## 2025-01-14 PROCEDURE — 87536 HIV-1 QUANT&REVRSE TRNSCRPJ: CPT

## 2025-01-14 PROCEDURE — 70450 CT HEAD/BRAIN W/O DYE: CPT | Mod: 26

## 2025-01-14 PROCEDURE — 36415 COLL VENOUS BLD VENIPUNCTURE: CPT

## 2025-01-14 PROCEDURE — 99223 1ST HOSP IP/OBS HIGH 75: CPT

## 2025-01-14 PROCEDURE — 97162 PT EVAL MOD COMPLEX 30 MIN: CPT | Mod: GP

## 2025-01-14 PROCEDURE — 97116 GAIT TRAINING THERAPY: CPT | Mod: GP

## 2025-01-14 PROCEDURE — 80048 BASIC METABOLIC PNL TOTAL CA: CPT

## 2025-01-14 PROCEDURE — 0241U: CPT

## 2025-01-14 PROCEDURE — 86360 T CELL ABSOLUTE COUNT/RATIO: CPT

## 2025-01-14 PROCEDURE — 70450 CT HEAD/BRAIN W/O DYE: CPT | Mod: MC

## 2025-01-14 PROCEDURE — 82746 ASSAY OF FOLIC ACID SERUM: CPT

## 2025-01-14 PROCEDURE — 80307 DRUG TEST PRSMV CHEM ANLYZR: CPT

## 2025-01-14 PROCEDURE — 97166 OT EVAL MOD COMPLEX 45 MIN: CPT | Mod: GO

## 2025-01-14 PROCEDURE — 85652 RBC SED RATE AUTOMATED: CPT

## 2025-01-14 PROCEDURE — 97530 THERAPEUTIC ACTIVITIES: CPT | Mod: GP

## 2025-01-14 PROCEDURE — 86140 C-REACTIVE PROTEIN: CPT

## 2025-01-14 RX ORDER — FLUTICASONE PROPIONATE 50 UG/1
2 SPRAY, METERED NASAL
Refills: 0 | DISCHARGE

## 2025-01-14 RX ORDER — PREGABALIN CAPSULES, CV 225 MG/1
50 CAPSULE ORAL EVERY 8 HOURS
Refills: 0 | Status: DISCONTINUED | OUTPATIENT
Start: 2025-01-14 | End: 2025-01-20

## 2025-01-14 RX ORDER — ENOXAPARIN SODIUM 100 MG/ML
40 INJECTION SUBCUTANEOUS EVERY 24 HOURS
Refills: 0 | Status: DISCONTINUED | OUTPATIENT
Start: 2025-01-14 | End: 2025-01-20

## 2025-01-14 RX ORDER — METHOCARBAMOL 500 MG
1 TABLET ORAL
Refills: 0 | DISCHARGE

## 2025-01-14 RX ORDER — QUETIAPINE FUMARATE 300 MG/1
150 TABLET ORAL AT BEDTIME
Refills: 0 | Status: DISCONTINUED | OUTPATIENT
Start: 2025-01-14 | End: 2025-01-20

## 2025-01-14 RX ORDER — ACETAMINOPHEN, DIPHENHYDRAMINE HCL, PHENYLEPHRINE HCL 325; 25; 5 MG/1; MG/1; MG/1
1 TABLET ORAL
Refills: 0 | DISCHARGE

## 2025-01-14 RX ORDER — BACLOFEN 100 %
1 POWDER (GRAM) MISCELLANEOUS
Refills: 0 | DISCHARGE

## 2025-01-14 RX ORDER — METHOCARBAMOL 500 MG
750 TABLET ORAL EVERY 8 HOURS
Refills: 0 | Status: DISCONTINUED | OUTPATIENT
Start: 2025-01-14 | End: 2025-01-20

## 2025-01-14 RX ORDER — PREGABALIN CAPSULES, CV 225 MG/1
1 CAPSULE ORAL
Refills: 0 | DISCHARGE

## 2025-01-14 RX ORDER — QUETIAPINE FUMARATE 300 MG/1
50 TABLET ORAL DAILY
Refills: 0 | Status: DISCONTINUED | OUTPATIENT
Start: 2025-01-14 | End: 2025-01-20

## 2025-01-14 RX ORDER — SODIUM CHLORIDE 0.4 %
1 AEROSOL, SPRAY (ML) NASAL
Refills: 0 | DISCHARGE

## 2025-01-14 RX ORDER — BACTERIOSTATIC SODIUM CHLORIDE 0.9 %
1000 VIAL (ML) INJECTION
Refills: 0 | Status: DISCONTINUED | OUTPATIENT
Start: 2025-01-14 | End: 2025-01-18

## 2025-01-14 RX ORDER — BICTEGRAVIR SODIUM, EMTRICITABINE, AND TENOFOVIR ALAFENAMIDE FUMARATE 50; 200; 25 MG/1; MG/1; MG/1
1 TABLET ORAL DAILY
Refills: 0 | Status: DISCONTINUED | OUTPATIENT
Start: 2025-01-14 | End: 2025-01-20

## 2025-01-14 RX ORDER — QUETIAPINE FUMARATE 300 MG/1
1 TABLET ORAL
Refills: 0 | DISCHARGE

## 2025-01-14 RX ORDER — ACETAMINOPHEN, DIPHENHYDRAMINE HCL, PHENYLEPHRINE HCL 325; 25; 5 MG/1; MG/1; MG/1
3 TABLET ORAL AT BEDTIME
Refills: 0 | Status: DISCONTINUED | OUTPATIENT
Start: 2025-01-14 | End: 2025-01-20

## 2025-01-14 RX ORDER — ONDANSETRON 4 MG/1
4 TABLET, ORALLY DISINTEGRATING ORAL EVERY 8 HOURS
Refills: 0 | Status: DISCONTINUED | OUTPATIENT
Start: 2025-01-14 | End: 2025-01-20

## 2025-01-14 RX ORDER — BACTERIOSTATIC SODIUM CHLORIDE 0.9 %
1500 VIAL (ML) INJECTION ONCE
Refills: 0 | Status: COMPLETED | OUTPATIENT
Start: 2025-01-14 | End: 2025-01-14

## 2025-01-14 RX ORDER — ACETAMINOPHEN, DIPHENHYDRAMINE HCL, PHENYLEPHRINE HCL 325; 25; 5 MG/1; MG/1; MG/1
5 TABLET ORAL AT BEDTIME
Refills: 0 | Status: DISCONTINUED | OUTPATIENT
Start: 2025-01-14 | End: 2025-01-20

## 2025-01-14 RX ORDER — MAGNESIUM, ALUMINUM HYDROXIDE 200-225/5
30 SUSPENSION, ORAL (FINAL DOSE FORM) ORAL EVERY 4 HOURS
Refills: 0 | Status: DISCONTINUED | OUTPATIENT
Start: 2025-01-14 | End: 2025-01-20

## 2025-01-14 RX ORDER — ACETAMINOPHEN 160 MG/5ML
650 SUSPENSION ORAL EVERY 6 HOURS
Refills: 0 | Status: DISCONTINUED | OUTPATIENT
Start: 2025-01-14 | End: 2025-01-20

## 2025-01-14 RX ORDER — BACLOFEN 100 %
10 POWDER (GRAM) MISCELLANEOUS EVERY 12 HOURS
Refills: 0 | Status: DISCONTINUED | OUTPATIENT
Start: 2025-01-14 | End: 2025-01-20

## 2025-01-14 RX ADMIN — ACETAMINOPHEN, DIPHENHYDRAMINE HCL, PHENYLEPHRINE HCL 5 MILLIGRAM(S): 325; 25; 5 TABLET ORAL at 21:23

## 2025-01-14 RX ADMIN — Medication 750 MILLIGRAM(S): at 22:26

## 2025-01-14 RX ADMIN — Medication 1500 MILLILITER(S): at 08:06

## 2025-01-14 RX ADMIN — QUETIAPINE FUMARATE 150 MILLIGRAM(S): 300 TABLET ORAL at 21:23

## 2025-01-14 RX ADMIN — Medication 10 MILLIGRAM(S): at 21:23

## 2025-01-14 RX ADMIN — PREGABALIN CAPSULES, CV 50 MILLIGRAM(S): 225 CAPSULE ORAL at 22:26

## 2025-01-14 NOTE — PHYSICAL THERAPY INITIAL EVALUATION ADULT - GENERAL OBSERVATIONS, REHAB EVAL
The Pt was received in ED hallway bed supine, calm, cooperative, and willing to participate with PT, +IV. Fair response to functional mobility trng and ambulation tx. Required CGA for bed mobility and MOD/MAX A x 1 for sit to stand and side step towards head of stretcher. Patient heavily utilizing BUE to offload weight from BLE. Assisted back into supine and adjusted for comfort, +alarm. The Pt was in NAD at end of tx.  Call bell, tray, and phone in place and within reach. NSG made aware.

## 2025-01-14 NOTE — CONSULT NOTE ADULT - ASSESSMENT
35 y/o man RH, PMHX of HIV since birth, asthma, drug abuse, hx of chronic LEs weakness, long standing weakness of the legs, LB spasms. Extensive w/u in the past. Comparing previous findings on exam about the same, with UMN signs of LEs spastic paraparesis. Imaging in the past not c/w CVA, myelitis, MS.  poss HIV myelopathy, ? compliance with anit HIV txs.  Suggest:  Check t cells and viral load.    Consider ID consult  Continue Baclofen/ methocarbamol/ lyrica.    ? IVIG, ( 2gms x Kg, dose divided over 5 days)

## 2025-01-14 NOTE — H&P ADULT - NSHPREVIEWOFSYSTEMS_GEN_ALL_CORE
ROS:  General:  weakness  Skin: No rash or bothersome skin lesions  Musculoskeletal: No arthalgias, myalgias or joint swelling  Eyes: No visual changes or eye pain  Ears: No hearing loss , otorrhea or ear pain  Nose, Mouth, Throat: see hpi.  cold sx.    Cardio: No chest pain or palpitations. no lower extremity edema. no syncope. no claudication.   Respiratory: No cough, shortness of breath or wheezing   GI: No diarrhea, constipation, blood in stools, abdominal pain, vomiting or heartburn  : No urinary frequency, hematuria, incontinence, or dysuria  Neurologic: leg wweaknes.  back pain  Psychiatric:  No anxiety or depression  Lymphatic:  No easy bruising, easy bleeding or swollen glands  Allergic: No itching, sneezing , watery eyes, clear rhinorrhea or recurrent infections

## 2025-01-14 NOTE — H&P ADULT - NSHPLABSRESULTS_GEN_ALL_CORE
12.5   7.48  )-----------( 275      ( 13 Jan 2025 16:57 )             38.2     01-13    141  |  106  |  19  ----------------------------<  75  3.6   |  27  |  0.71    Ca    9.7      13 Jan 2025 16:57    TPro  8.1  /  Alb  3.7  /  TBili  0.8  /  DBili  x   /  AST  47[H]  /  ALT  29  /  AlkPhos  69  01-13    CAPILLARY BLOOD GLUCOSE          Urinalysis Basic - ( 13 Jan 2025 16:57 )    Color: x / Appearance: x / SG: x / pH: x  Gluc: 75 mg/dL / Ketone: x  / Bili: x / Urobili: x   Blood: x / Protein: x / Nitrite: x   Leuk Esterase: x / RBC: x / WBC x   Sq Epi: x / Non Sq Epi: x / Bacteria: x

## 2025-01-14 NOTE — CONSULT NOTE ADULT - SUBJECTIVE AND OBJECTIVE BOX
Neurology Consult requested by:   Patient is a 34y old  Male who presents with a chief complaint of weakness (2025 14:36)     HPI:  33 y/o man PMHX of HIV since birth, asthma, drug abuse, reported hx of GBS, myelopathy comes c/o worsening weakness of LEs, chronic back pain, spasms. Follow[facility] with [facility]. Has had mx admissions, past w/u including LP, MRIs. No new sxs, denies incontinence.        his balance, is able to ambulate independently, balance and gait have worsened recently. He has had extensive w/u and admissions at multiple [facility] for fluctuating/worsening lower extremity weakness in legs and chronic back pain who presented to  with CC of worsening weakness.  Patient notes he started feeling sick ~5 days ago.  Cold sx.  Runny nose/ sore throat.  Since then, patient has had progressive weakness in legs and pain in legs and back.  Patient notes when he gets sick, his weakness usually gets worse.  He had GBS in the past and has had chronic LE weakness since then.  He also has hx of stroke due to ? AFIB (patient states was on AC but now taken off) which also led to muscle weakness.  In the ER, patient had CT head and labs which were overall unremarkable.        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  Asthma  Closed fracture of multiple ribs of right side, initial encounter  Cocaine abuse  Chronic sinusitis  Homeless  GBS (Guillain Starr syndrome)  HIV disease  History of orthopedic surgery  left arm        FAMILY HISTORY:  FH: HIV infection (Mother)      Social History:    Hx of drug abuse.    Homeless.      Allergies:    Ceclor (Unknown)  Mushrooms (Unknown)  Toradol (Swelling)  Toradol (Anaphylaxis; Swelling)       (2025 14:36)      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      Asthma      Closed fracture of multiple ribs of right side, initial encounter      Cocaine abuse      Chronic sinusitis      Homeless      GBS (Guillain Starr syndrome)      HIV disease      History of orthopedic surgery  left arm        FAMILY HISTORY:  FH: HIV infection (Mother)      Social Hx:  Nonsmoker, no drug or alcohol use  Medications and Allergies ReviewedMEDICATIONS  (STANDING):  baclofen 10 milliGRAM(s) Oral every 12 hours  bictegravir 50 mG/emtricitabine 200 mG/tenofovir alafenamide 25 mG (BIKTARVY) 1 Tablet(s) Oral daily  enoxaparin Injectable 40 milliGRAM(s) SubCutaneous every 24 hours  melatonin 5 milliGRAM(s) Oral at bedtime  methocarbamol 750 milliGRAM(s) Oral every 8 hours  pregabalin 50 milliGRAM(s) Oral every 8 hours  QUEtiapine 150 milliGRAM(s) Oral at bedtime  QUEtiapine 50 milliGRAM(s) Oral daily  sodium chloride 0.9%. 1000 milliLiter(s) (75 mL/Hr) IV Continuous <Continuous>     ROS: Pertinent positives in HPI, all other ROS were reviewed and are negative.      Examination:   Vital Signs Last 24 Hrs  T(C): 36.9 (2025 15:37), Max: 36.9 (2025 15:37)  T(F): 98.4 (2025 15:37), Max: 98.4 (2025 15:37)  HR: 77 (2025 15:37) (77 - 111)  BP: 127/84 (2025 15:37) (109/66 - 150/76)  BP(mean): 97 (2025 15:37) (79 - 97)  RR: 18 (2025 15:37) (16 - 18)  SpO2: 100% (2025 15:37) (99% - 100%)    Parameters below as of 2025 15:37  Patient On (Oxygen Delivery Method): room air      General: Cooperative, NAD   NECK: supple, no masses  ENT: Normal hearing   Vascular : no carotid bruits,   Lungs: CTAB  Chest: RRR, no murmurs  Extremities: nontender, no edema  Musculoskeletal: no adventitious movements, no joint stiffness  Skin: no rash    Neurological Examination:  NIHSS:  MS: AOx3. Appropriately interactive, normal affect. Speech fluent w/o paraphasic error, repetition, naming intact   CN: VFFTC, PERLL, EOMI, V1-3 sensation intact, face symmetric, hearing intact, palate elevates symmetrically, tongue midline, SCM equal bilaterally  Motor: increased tone LEs, UES no drift, 5-/5 bilat. LES pros ~ 3/5, distal 4/5, c/o pain   Sens: reduced distally light touch distally in the legs, decreased JPS in feet.    Reflexes: 2-3/4 all over, + clonus LLE, Upgoing toes b/l  Coord:  No dysmetria of Ues   Gait: Cannot test    Labs: Reviewed    Comprehensive Metabolic Panel (25 @ 16:57)   Sodium: 141 mmol/L  Potassium: 3.6 mmol/L  Chloride: 106 mmol/L  Carbon Dioxide: 27 mmol/L  Anion Gap: 8 mmol/L  Blood Urea Nitrogen: 19 mg/dL  Creatinine: 0.71 mg/dL  Glucose: 75 mg/dL  Calcium: 9.7 mg/dL  Protein Total: 8.1 gm/dL  Albumin: 3.7 g/dL  Bilirubin Total: 0.8 mg/dL  Alkaline Phosphatase: 69 U/L  Aspartate Aminotransferase (AST/SGOT): 47 U/L  Alanine Aminotransferase (ALT/SGPT): 29 U/L  eGFR: 123:   Complete Blood Count + Automated Diff (25 @ 16:57)   WBC Count: 7.48 K/uL  RBC Count: 4.18 M/uL  Hemoglobin: 12.5 g/dL  Hematocrit: 38.2 %  Mean Cell Volume: 91.4 fl  Mean Cell Hemoglobin: 29.9 pg  Mean Cell Hemoglobin Conc: 32.7 g/dL  Red Cell Distrib Width: 13.0 %  Platelet Count - Automated: 275 K/uL    Creatine Kinase: 753 U/L (25 @ 16:57)     < from: CT Lumbar Spine No Cont (24 @ 03:02) >    IMPRESSION:    CT THORACIC SPINE:  No acute fracture or traumatic subluxation.    CT LUMBAR SPINE:  No acute fracture or traumatic subluxation.    < end of copied text >      < from: MR Lumbar Spine No Cont (05.10.24 @ 07:59) >    IMPRESSION:  Small left paracentral/foraminal disc protrusion which reaches and mildly   displaces the left S1 nerve root.  Correlate with symptoms.    < end of copied text >    < from: MR Head No Cont (05.10.24 @ 07:59) >    IMPRESSION:  No evidence of a mass or current evidence of acute ischemia.  Mild  volume loss with mild biparietal periventricular white matter   hyperintensity. Findings are stable from . Findings may be related to   prior infection and/or demyelination. Correlate with patient's clinical   history,    < end of copied text >            Imaging: Reviewed Cerebrospinal Fluid Cell Count-1 (24 @ 04:46)   CSF Color: No Color  CSF Appearance: Clear  CSF Neutrophils: N/A: WBC count is <5 therefore no differential performed. %  Tube Type: Tube 4  RBC Count - Spinal Fluid: 29 /cmm  Total Nucleated Cell Count, CSF: 3 /uL  Protein, CSF (24 @ 04:46)   Protein, CSF: 32 mg/dL  Glucose, CSF (24 @ 04:46)   Glucose, CSF: 55 mg/dLG/24h  CSF IgG Index (24 @ 04:46)   Albumin: 3305 mg/dL  Quantitative Ig mg/dL  IgG/Albumin Ratio, Serum: 0.29 Ratio  IgG CSF: 5.4 mg/dL  CSF ALBU: 22.6 mg/dL  IgG/Albumin Ratio, CSF: 0.24 Ratio  IgG Index: 0.8  IgG Synthesis: 8.9 mg/dayCulture - CSF with Gram Stain . (24 @ 04:46)   Gram Stain:   No polymorphonuclear leukocytes   No organisms seen   VDRL Titer, CSF (21 @ 15:52)   VDRL Titer, CSF: Nonreact:   Lyme Antibodies CSF [place] (21 @ 15:52)   Lyme Antibodies CSF [place]: Nonreact:  CSF PCR Panel (21 @ 15:52)   CSF PCR Result: NotDetec:     Oligoclonal Bands, CSF: Present:

## 2025-01-14 NOTE — ED PROVIDER NOTE - PROGRESS NOTE DETAILS
Dr. Braun ED attending- received sign out from Dr. Nicholson pending SW and PT eval. PT evaluated pt. SW recommends admission for BRAYDEN placement. Admitted to Dr. Nation hospitalist attending for generalized weakness, BRAYDEN placement

## 2025-01-14 NOTE — ED PROVIDER NOTE - CLINICAL SUMMARY MEDICAL DECISION MAKING FREE TEXT BOX
Oliver Simental DO (Attending): 34yM with PMH of HIV, polysubstance abuse presents to the ED c/o gen weakness, malaise and difficulty walking. Pt is concerned he may need to go to [facility]. Pt with chronic weakness, nothing acute with non focal exam and HD stable. Anastasiya Pardo from  who reccomends hold in the ED until the morning for PT consult, basic labs. Reassess. No concern for GBS or organic cause of pt's weakness likely 2/2 to deconditioning.
cardiovascular/respiratory

## 2025-01-14 NOTE — ED PROVIDER NOTE - OBJECTIVE STATEMENT
34yM with PMH of HIV, polysubstance abuse presents to the ED c/o gen weakness, malaise and difficulty walking. Pt is concerned he may need to go to [facility]. Denies fever, cough, n/v/d, dysuria CP, SOB, abd pain.

## 2025-01-14 NOTE — H&P ADULT - ASSESSMENT
35 y/o male with PMHX of HIV since birth, asthma, drug abuse, hx of GBS in the past with chronic weakness, hx of CVA and PAF as per patient s/p weakness in legs and chronic pain who presented to  with CC of worsening weakness.  In the ER, patient had CT head and labs which were overall unremarkable.      #Weakness/ pain:    Admit to medsurg.    Patient with hx of GBS and CVA in the past.    Patient states worsening sx for last 5 days.    CT head negative.    Check MRI brain with and without salinas.  R/o CVA, demyelination, HIV related dz.  Unclear how well controlled HIV.    Check drug screen.    CPK normal.    Check TSH/ ESR/ CRP/ B12/ folate.    PT eval.    Will require REHAB placement.     #HIV:    Cont home meds.    Check t cells and viral load.    Ongoing since birth-- obtained from mother.      #Chronic weakness/ spasm/ GBS/ CVA:    Cont hoem meds.    Baclofen/ methocarbamol/ lyrica.      #Psych:    Cont seroquel.      #DVT Proph:  lovenox.

## 2025-01-14 NOTE — H&P ADULT - HISTORY OF PRESENT ILLNESS
35 y/o male with PMHX of HIV since birth, asthma, drug abuse, hx of GBS in the past with chronic weakness, hx of CVA and PAF as per patient s/p weakness in legs and chronic pain who presented to  with CC of worsening weakness.  Patient notes he started feeling sick ~5 days ago.  Cold sx.  Runny nose/ sore throat.  Since then, patient has had progressive weakness in legs and pain in legs and back.  Patient notes when he gets sick, his weakness usually gets worse.  He had GBS in the past and has had chronic LE weakness since then.  He also has hx of stroke due to ? AFIB (patient states was on AC but now taken off) which also led to muscle weakness.  In the ER, patient had CT head and labs which were overall unremarkable.        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  Asthma  Closed fracture of multiple ribs of right side, initial encounter  Cocaine abuse  Chronic sinusitis  Homeless  GBS (Guillain [place] syndrome)  HIV disease  History of orthopedic surgery  left arm        FAMILY HISTORY:  FH: HIV infection (Mother)      Social History:    Hx of drug abuse.    Homeless.      Allergies:    Ceclor (Unknown)  Mushrooms (Unknown)  Toradol (Swelling)  Toradol (Anaphylaxis; Swelling)

## 2025-01-14 NOTE — ED PROVIDER NOTE - PHYSICAL EXAMINATION
GEN: Patient awake alert NAD.   HEENT: normocephalic, atraumatic, EOMI, no scleral icterus, moist MM  CARDIAC: RRR, S1, S2, no murmur.   PULM: CTA B/L no wheeze, rhonchi, rales.   ABD: soft NT, ND, no rebound no guarding, no CVA tenderness.   MSK: Moving all extremities, no edema.  NEURO: A&Ox3, no focal neurological deficits,  SKIN: warm, dry, no rash.

## 2025-01-14 NOTE — PHYSICAL THERAPY INITIAL EVALUATION ADULT - ADDITIONAL COMMENTS
Patient states that he lives at home with his mother in a house with 4STE and 5 steps to navigate to bedroom. Patient independent in all aspects of mobility prior without use of an assistive device.

## 2025-01-14 NOTE — H&P ADULT - NSHPPHYSICALEXAM_GEN_ALL_CORE
PEx  T(C): 36.8 (01-14-25 @ 07:11), Max: 36.8 (01-14-25 @ 04:39)  HR: 84 (01-14-25 @ 07:11) (80 - 111)  BP: 112/68 (01-14-25 @ 07:11) (109/66 - 150/76)  RR: 16 (01-14-25 @ 07:11) (16 - 18)  SpO2: 100% (01-14-25 @ 07:11) (99% - 100%)  Wt(kg): --  General:   NAD.  lying in bed.    Skin: no rash or prominent lesions  Head: normocephalic, atraumatic     Sinuses: non-tender  Nose: no external lesions, mucosa non-inflamed, septum and turbinates normal  Throat: no erythema, exudates or lesions.  Neck: Supple without lymphadenopathy. Thyroid no thyromegaly, no palpable thyroid nodules, no palpable nodules or masses, carotid arteries no bruits.   Breasts: No palpable masses or lesions.  Heart: RRR, no murmur or gallop.  Normal S1, S2.  No S3, S4.   Lungs: CTA bilaterally, no wheezes, rhonchi, rales.  Breathing unlabored.   Chest wall: Normal insp   Abdomen:  Soft, NT/ND, normal bowel sounds, no HSM, no masses.  No peritoneal signs.   Back: spine normal without deformity or tenderness.  Normal ROM   : Exam normal.  no inguinal hernias.  Extremities: No deformities, clubbing, cyanosis, or edema.  Musculoskeletal: unable to walk.  stiffness.    Neurologic:  weakness in legs.  3/5.  barely able to lift legs off bed.  pain with movement.  spasm.    Psychiatric: Oriented X3, intact recent and remote memory, judgement and insight, normal mood and affect.

## 2025-01-14 NOTE — ED PROVIDER NOTE - NSICDXPASTMEDICALHX_GEN_ALL_CORE_FT
PAST MEDICAL HISTORY:  Asthma     Chronic sinusitis     Closed fracture of multiple ribs of right side, initial encounter     Cocaine abuse     GBS (Guillain Nadeau syndrome)     HIV (human immunodeficiency virus infection) from birth    HIV disease     Homeless

## 2025-01-14 NOTE — H&P ADULT - VTE RISK ASSESSMENT
Received signed and completed form from Physician's Office.  Form faxed   VTE Assessment already completed for this visit

## 2025-01-14 NOTE — PHYSICAL THERAPY INITIAL EVALUATION ADULT - PERTINENT HX OF CURRENT PROBLEM, REHAB EVAL
34yM with PMH of HIV, polysubstance abuse presents to the ED c/o gen weakness, malaise and difficulty walking. Pt is concerned he may need to go to [facility]. Pt with chronic weakness, nothing acute with non focal exam and HD stable. Anastasiya Pardo from  who reccomends hold in the ED until the morning for PT consult, basic labs. Reassess. No concern for GBS or organic cause of pt's weakness likely 2/2 to deconditioning.

## 2025-01-14 NOTE — PATIENT PROFILE ADULT - FALL HARM RISK - HARM RISK INTERVENTIONS
Assistance with ambulation/Assistance OOB with selected safe patient handling equipment/Communicate Risk of Fall with Harm to all staff/Discuss with provider need for PT consult/Monitor gait and stability/Provide patient with walking aids - walker, cane, crutches/Reinforce activity limits and safety measures with patient and family/Sit up slowly, dangle for a short time, stand at bedside before walking/Tailored Fall Risk Interventions/Visual Cue: Yellow wristband and red socks/Bed in lowest position, wheels locked, appropriate side rails in place/Call bell, personal items and telephone in reach/Instruct patient to call for assistance before getting out of bed or chair/Non-slip footwear when patient is out of bed/[place] to call system/Physically safe environment - no spills, clutter or unnecessary equipment/Purposeful Proactive Rounding/Room/bathroom lighting operational, light cord in reach

## 2025-01-14 NOTE — PHARMACOTHERAPY INTERVENTION NOTE - COMMENTS
Performed medication reconciliation and home medication list updated. Medications verified with patient.     Home medications:  Albuterol (Eqv-Proventil HFA) 90 mcg/inh inhalation aerosol: 2 puff(s) inhaled every 6 hours as needed for  shortness of breath and/or wheezing (14 Jan 2025 12:22)  baclofen 10 mg oral tablet: 1 tab(s) orally every 12 hours (14 Jan 2025 12:28)  fluticasone 50 mcg/inh nasal spray: 2 spray(s) in each nostril once a day as needed for  allergy symptoms (14 Jan 2025 12:29)  melatonin 5 mg oral tablet: 1 tab(s) orally once a day (at bedtime) (14 Jan 2025 12:29)  methocarbamol 750 mg oral tablet: 1 tab(s) orally every 8 hours (14 Jan 2025 12:28)  pregabalin 50 mg oral capsule: 1 cap(s) orally every 8 hours (14 Jan 2025 12:28)  QUEtiapine 150 mg oral tablet: 1 tab(s) orally once a day (at bedtime) **also takes 50mg qAM, TDD: 200mg (14 Jan 2025 12:29)  QUEtiapine 50 mg oral tablet: 1 tab(s) orally once a day **also takes 150mg qHS, TDD: 200mg (14 Jan 2025 12:29)  sodium chloride nasal: 1 spray(s) in each nostril once a day as needed for  allergy symptoms (14 Jan 2025 12:29)  Biktarvy 1 tab orally once a day

## 2025-01-15 LAB
4/8 RATIO: 0.33 RATIO — LOW (ref 0.9–3.6)
ABS CD8: 1388 CELLS/UL — HIGH (ref 142–740)
ANION GAP SERPL CALC-SCNC: 5 MMOL/L — SIGNIFICANT CHANGE UP (ref 5–17)
BUN SERPL-MCNC: 12 MG/DL — SIGNIFICANT CHANGE UP (ref 7–23)
CALCIUM SERPL-MCNC: 8.9 MG/DL — SIGNIFICANT CHANGE UP (ref 8.5–10.1)
CD3 BLASTS SPEC-ACNC: 1920 CELLS/UL — HIGH (ref 672–1870)
CD3 BLASTS SPEC-ACNC: 77 % — SIGNIFICANT CHANGE UP (ref 59–83)
CD4 %: 18 % — LOW (ref 30–62)
CD8 %: 56 % — HIGH (ref 12–36)
CHLORIDE SERPL-SCNC: 110 MMOL/L — HIGH (ref 96–108)
CO2 SERPL-SCNC: 24 MMOL/L — SIGNIFICANT CHANGE UP (ref 22–31)
CREAT SERPL-MCNC: 0.78 MG/DL — SIGNIFICANT CHANGE UP (ref 0.5–1.3)
EGFR: 119 ML/MIN/1.73M2 — SIGNIFICANT CHANGE UP
FOLATE SERPL-MCNC: 15.5 NG/ML — SIGNIFICANT CHANGE UP
GLUCOSE SERPL-MCNC: 114 MG/DL — HIGH (ref 70–99)
HCT VFR BLD CALC: 38.5 % — LOW (ref 39–50)
HGB BLD-MCNC: 12.4 G/DL — LOW (ref 13–17)
HIV-1 VIRAL LOAD RESULT: ABNORMAL
HIV1 RNA # SERPL NAA+PROBE: 208 — SIGNIFICANT CHANGE UP
HIV1 RNA SER-IMP: SIGNIFICANT CHANGE UP
HIV1 RNA SERPL NAA+PROBE-ACNC: ABNORMAL
HIV1 RNA SERPL NAA+PROBE-LOG#: 2.32 — SIGNIFICANT CHANGE UP
MCHC RBC-ENTMCNC: 29.5 PG — SIGNIFICANT CHANGE UP (ref 27–34)
MCHC RBC-ENTMCNC: 32.2 G/DL — SIGNIFICANT CHANGE UP (ref 32–36)
MCV RBC AUTO: 91.7 FL — SIGNIFICANT CHANGE UP (ref 80–100)
PLATELET # BLD AUTO: 221 K/UL — SIGNIFICANT CHANGE UP (ref 150–400)
POTASSIUM SERPL-MCNC: 3.5 MMOL/L — SIGNIFICANT CHANGE UP (ref 3.5–5.3)
POTASSIUM SERPL-SCNC: 3.5 MMOL/L — SIGNIFICANT CHANGE UP (ref 3.5–5.3)
RBC # BLD: 4.2 M/UL — SIGNIFICANT CHANGE UP (ref 4.2–5.8)
RBC # FLD: 12.5 % — SIGNIFICANT CHANGE UP (ref 10.3–14.5)
SODIUM SERPL-SCNC: 139 MMOL/L — SIGNIFICANT CHANGE UP (ref 135–145)
T-CELL CD4 SUBSET PNL BLD: 460 CELLS/UL — LOW (ref 489–1457)
VIT B12 SERPL-MCNC: 731 PG/ML — SIGNIFICANT CHANGE UP (ref 232–1245)
WBC # BLD: 4.43 K/UL — SIGNIFICANT CHANGE UP (ref 3.8–10.5)
WBC # FLD AUTO: 4.43 K/UL — SIGNIFICANT CHANGE UP (ref 3.8–10.5)

## 2025-01-15 PROCEDURE — 99233 SBSQ HOSP IP/OBS HIGH 50: CPT

## 2025-01-15 RX ORDER — IMMUNE GLOBULIN INFUSION (HUMAN) 100 MG/ML
50 INJECTION, SOLUTION INTRAVENOUS; SUBCUTANEOUS DAILY
Refills: 0 | Status: COMPLETED | OUTPATIENT
Start: 2025-01-15 | End: 2025-01-18

## 2025-01-15 RX ORDER — DIPHENHYDRAMINE HCL 25 MG
50 CAPSULE ORAL DAILY
Refills: 0 | Status: DISCONTINUED | OUTPATIENT
Start: 2025-01-15 | End: 2025-01-20

## 2025-01-15 RX ORDER — ACETAMINOPHEN 160 MG/5ML
650 SUSPENSION ORAL DAILY
Refills: 0 | Status: COMPLETED | OUTPATIENT
Start: 2025-01-15 | End: 2025-01-18

## 2025-01-15 RX ADMIN — Medication 750 MILLIGRAM(S): at 06:00

## 2025-01-15 RX ADMIN — QUETIAPINE FUMARATE 50 MILLIGRAM(S): 300 TABLET ORAL at 09:47

## 2025-01-15 RX ADMIN — PREGABALIN CAPSULES, CV 50 MILLIGRAM(S): 225 CAPSULE ORAL at 13:28

## 2025-01-15 RX ADMIN — Medication 750 MILLIGRAM(S): at 21:29

## 2025-01-15 RX ADMIN — Medication 10 MILLIGRAM(S): at 09:47

## 2025-01-15 RX ADMIN — BICTEGRAVIR SODIUM, EMTRICITABINE, AND TENOFOVIR ALAFENAMIDE FUMARATE 1 TABLET(S): 50; 200; 25 TABLET ORAL at 09:47

## 2025-01-15 RX ADMIN — Medication 750 MILLIGRAM(S): at 13:28

## 2025-01-15 RX ADMIN — PREGABALIN CAPSULES, CV 50 MILLIGRAM(S): 225 CAPSULE ORAL at 21:29

## 2025-01-15 RX ADMIN — ACETAMINOPHEN 650 MILLIGRAM(S): 160 SUSPENSION ORAL at 15:56

## 2025-01-15 RX ADMIN — Medication 50 MILLIGRAM(S): at 15:57

## 2025-01-15 RX ADMIN — PREGABALIN CAPSULES, CV 50 MILLIGRAM(S): 225 CAPSULE ORAL at 06:00

## 2025-01-15 RX ADMIN — Medication 10 MILLIGRAM(S): at 21:34

## 2025-01-15 RX ADMIN — IMMUNE GLOBULIN INFUSION (HUMAN) 62.5 GRAM(S): 100 INJECTION, SOLUTION INTRAVENOUS; SUBCUTANEOUS at 16:37

## 2025-01-15 RX ADMIN — QUETIAPINE FUMARATE 150 MILLIGRAM(S): 300 TABLET ORAL at 21:28

## 2025-01-15 RX ADMIN — ACETAMINOPHEN, DIPHENHYDRAMINE HCL, PHENYLEPHRINE HCL 5 MILLIGRAM(S): 325; 25; 5 TABLET ORAL at 21:28

## 2025-01-15 NOTE — CONSULT NOTE ADULT - ASSESSMENT
Assessment:  34M with HIV (since birth), Drug abuse, Hx of GBS with chronic weakness, presents with worsening weakness of back legs  Reports history of GBS in the past and had chronic LE weakness  Afebrile on admission, on RA  No leukocytosis  Cr 0.78  CXR clear  CTH negative  RVP negative  Neurology eval with similar exam findings, possible ?HIV myelopathy     Antimicrobials:  bictegravir 50 mG/emtricitabine 200 mG/tenofovir alafenamide 25 mG (BIKTARVY) 1 daily    Impression:   #HIV  #Bilateral LE Weakness    Recommendations:  - continue Biktarvy 1tab daily  - monitor temperature curve  - trend WBC  - Prior cultures reviewed. An epidemiologic assessment was performed. There is a significant risk for resistant microorganisms to spread to family members, and/or healthcare staff. The patient will be placed on isolation according to infection control policy. Will reconsider isolation measures based on new culture results and other clinical data as appropriate Appropriate cultures collected and an appropriate broad spectrum antibiotic therapy will be considered  - follow up Tcell Subset, HIV VL  - Neurology appreciated  - rest per primary team    HH Token not applicable     Assessment:  34M with HIV (since birth), Drug abuse, Hx of GBS with chronic weakness, presents with worsening pain and weakness of both legs and lower back  Reports history of GBS in the past and had chronic LE weakness  Reports being compliant with Biktarvy, recently saw his ID doctor 2 weeks ago, reportedly undetectable VL  Upon review, patient had similar presentation in 7/2023, worsening chronic back pain and LE weakness. Patient had myelopathy for at least 5-7 years, with multiple physical therapy, extensive work up in the past with no evidence of no compressive myelopathy, no demyelinating disease, no other cause found on labs tests, imaging  Afebrile on admission, on RA  No leukocytosis  Cr 0.78  CXR clear  CTH negative  EMR with VL 2K and  1 year ago (5/2024)  Neurology eval with similar exam findings, possible ?HIV myelopathy     Antimicrobials:  bictegravir 50 mG/emtricitabine 200 mG/tenofovir alafenamide 25 mG (BIKTARVY) 1 daily    Impression:   #HIV  #Bilateral LE Weakness  #Chronic Back Pain    Recommendations:  - continue Biktarvy 1tab daily  - monitor temperature curve  - trend WBC  - Prior cultures reviewed. An epidemiologic assessment was performed. There is a significant risk for resistant microorganisms to spread to family members, and/or healthcare staff. The patient will be placed on isolation according to infection control policy. Will reconsider isolation measures based on new culture results and other clinical data as appropriate Appropriate cultures collected and an appropriate broad spectrum antibiotic therapy will be considered  - follow up Tcell Subset, HIV VL  - Neurology appreciated  - rest per primary team    HH Token not applicable

## 2025-01-15 NOTE — CONSULT NOTE ADULT - SUBJECTIVE AND OBJECTIVE BOX
Patient is a 34y old  Male who presents with a chief complaint of weakness (14 Jan 2025 15:56)    HPI:  35 y/o male with PMHX of HIV since birth, asthma, drug abuse, hx of GBS in the past with chronic weakness, hx of CVA and PAF as per patient s/p weakness in legs and chronic pain who presented to  with CC of worsening weakness.  Patient notes he started feeling sick ~5 days ago.  Cold sx.  Runny nose/ sore throat.  Since then, patient has had progressive weakness in legs and pain in legs and back.  Patient notes when he gets sick, his weakness usually gets worse.  He had GBS in the past and has had chronic LE weakness since then.  He also has hx of stroke due to ? AFIB (patient states was on AC but now taken off) which also led to muscle weakness.  In the ER, patient had CT head and labs which were overall unremarkable.  (14 Jan 2025 14:36)  Above HPI reviewed and reconciled with patient    34M with HIV (since birth), Drug abuse, Hx of GBS with chronic weakness, presents with worsening weakness of back legs  Reports history of GBS in the past and had chronic LE weakness  Afebrile on admission, on RA  No leukocytosis  Cr 0.78  CXR clear  CTH negative  Neurology eval with similar exam findings, possible ?HIV myelopathy     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      Asthma      Closed fracture of multiple ribs of right side, initial encounter      Cocaine abuse      Chronic sinusitis      Homeless      GBS (Guillain [place] syndrome)      HIV disease      History of orthopedic surgery  left arm        FAMILY HISTORY:  FH: HIV infection (Mother)      Social Hx: former cocaine user     Allergies  Ceclor (Unknown)  Mushrooms (Unknown)  Toradol (Swelling)  Toradol (Anaphylaxis; Swelling)    ANTIMICROBIALS (past 90 days)  MEDICATIONS  (STANDING):        ACTIVE ANTIMICROBIALS  bictegravir 50 mG/emtricitabine 200 mG/tenofovir alafenamide 25 mG (BIKTARVY) 1 daily    MEDICATIONS  (STANDING):  acetaminophen     Tablet .. 650 every 6 hours PRN  aluminum hydroxide/magnesium hydroxide/simethicone Suspension 30 every 4 hours PRN  baclofen 10 every 12 hours  enoxaparin Injectable 40 every 24 hours  influenza   Vaccine 0.5 once  melatonin 5 at bedtime  melatonin 3 at bedtime PRN  methocarbamol 750 every 8 hours  ondansetron Injectable 4 every 8 hours PRN  pregabalin 50 every 8 hours  QUEtiapine 150 at bedtime  QUEtiapine 50 daily      REVIEW OF SYSTEMS  [  ] ROS unobtainable because:    [ x ] All other systems negative except as noted below:	    Constitutional:  [ ] fever [ ] chills  [ ] weight loss  [ ] weakness  Skin:  [ ] rash [ ] phlebitis	  Eyes: [ ] icterus [ ] pain  [ ] discharge	  ENMT: [ ] sore throat  [ ] thrush [ ] ulcers [ ] exudates  Respiratory: [ ] dyspnea [ ] hemoptysis [ ] cough [ ] sputum	  Cardiovascular:  [ ] chest pain [ ] palpitations [ ] edema	  Gastrointestinal:  [ ] nausea [ ] vomiting [ ] diarrhea [ ] constipation [ ] pain	  Genitourinary:  [ ] dysuria [ ] frequency [ ] hematuria [ ] discharge [ ] flank pain  [ ] incontinence  Musculoskeletal:  [ ] myalgias [ ] arthralgias [ ] arthritis  [ ] back pain  Neurological:  [ ] headache [ ] seizures  [ ] confusion/altered mental status  Psychiatric:  [ ] anxiety [ ] depression	  Hematology/Lymphatics:  [ ] lymphadenopathy  Endocrine:  [ ] adrenal [ ] thyroid  Allergic/Immunologic:	 [ ] transplant [ ] seasonal    Vital Signs Last 24 Hrs  T(C): 36.8 (15 Tutu 2025 08:02), Max: 36.9 (14 Jan 2025 15:37)  T(F): 98.2 (15 Tutu 2025 08:02), Max: 98.4 (14 Jan 2025 15:37)  HR: 75 (15 Tutu 2025 08:02) (74 - 77)  BP: 112/61 (15 Tutu 2025 08:02) (112/61 - 136/87)  BP(mean): 97 (14 Jan 2025 15:37) (97 - 97)  RR: 18 (15 Tutu 2025 08:02) (18 - 18)  SpO2: 100% (15 Tutu 2025 08:02) (99% - 100%)    Parameters below as of 15 Tutu 2025 08:02  Patient On (Oxygen Delivery Method): room air        Physical Exam:  Constitutional: NAD  Head/Eyes: no icterus  LUNGS:  CTA  CVS:  regular rhythm  Abd:  soft, non-tender; non-distended  Ext:  no edema  Vascular:  IV site no erythema tenderness or discharge  Neuro: AAO X 3, non- focal    Labs: all available labs reviewed                        12.4   4.43  )-----------( 221      ( 15 Tutu 2025 07:10 )             38.5     01-15    139  |  110[H]  |  12  ----------------------------<  114[H]  3.5   |  24  |  0.78    Ca    8.9      15 Tutu 2025 07:10    TPro  8.1  /  Alb  3.7  /  TBili  0.8  /  DBili  x   /  AST  47[H]  /  ALT  29  /  AlkPhos  69  01-13     LIVER FUNCTIONS - ( 13 Jan 2025 16:57 )  Alb: 3.7 g/dL / Pro: 8.1 gm/dL / ALK PHOS: 69 U/L / ALT: 29 U/L / AST: 47 U/L / GGT: x           Urinalysis Basic - ( 15 Tutu 2025 07:10 )    Color: x / Appearance: x / SG: x / pH: x  Gluc: 114 mg/dL / Ketone: x  / Bili: x / Urobili: x   Blood: x / Protein: x / Nitrite: x   Leuk Esterase: x / RBC: x / WBC x   Sq Epi: x / Non Sq Epi: x / Bacteria: x          Radiology: all available radiological tests reviewed  < from: CT Head No Cont (01.14.25 @ 16:15) >  Impression:  Unremarkable noncontrast head CT.    < end of copied text >  < from: Xray Chest 1 View-PORTABLE IMMEDIATE (Xray Chest 1 View-PORTABLE IMMEDIATE .) (01.14.25 @ 09:35) >  IMPRESSION: Negative chest.    < end of copied text >      Advanced directives addressed: full resuscitation Patient is a 34y old  Male who presents with a chief complaint of weakness (14 Jan 2025 15:56)    HPI:  33 y/o male with PMHX of HIV since birth, asthma, drug abuse, hx of GBS in the past with chronic weakness, hx of CVA and PAF as per patient s/p weakness in legs and chronic pain who presented to  with CC of worsening weakness.  Patient notes he started feeling sick ~5 days ago.  Cold sx.  Runny nose/ sore throat.  Since then, patient has had progressive weakness in legs and pain in legs and back.  Patient notes when he gets sick, his weakness usually gets worse.  He had GBS in the past and has had chronic LE weakness since then.  He also has hx of stroke due to ? AFIB (patient states was on AC but now taken off) which also led to muscle weakness.  In the ER, patient had CT head and labs which were overall unremarkable.  (14 Jan 2025 14:36)  Above HPI reviewed and reconciled with patient    34M with HIV (since birth), Drug abuse, Hx of GBS with chronic weakness, presents with worsening pain and weakness of both legs and lower back  Reports history of GBS in the past and had chronic LE weakness  Reports being compliant with Biktarvy, recently saw his ID doctor 2 weeks ago, reportedly undetectable VL  Upon review, patient had similar presentation in 7/2023, worsening chronic back pain and LE weakness. Patient had myelopathy for at least 5-7 years, with multiple physical therapy, extensive work up in the past with no evidence of no compressive myelopathy, no demyelinating disease, no other cause found on labs tests, imaging  Afebrile on admission, on RA  No leukocytosis  Cr 0.78  CXR clear  CTH negative  Neurology eval with similar exam findings, possible ?HIV myelopathy       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      Asthma      Closed fracture of multiple ribs of right side, initial encounter      Cocaine abuse      Chronic sinusitis      Homeless      GBS (Guillain [place] syndrome)      HIV disease      History of orthopedic surgery  left arm        FAMILY HISTORY:  FH: HIV infection (Mother)      Social Hx: former cocaine user, active tobacco use but denies ETOH or IVDU    Allergies  Ceclor (Unknown)  Mushrooms (Unknown)  Toradol (Swelling)  Toradol (Anaphylaxis; Swelling)    ANTIMICROBIALS (past 90 days)  MEDICATIONS  (STANDING):        ACTIVE ANTIMICROBIALS  bictegravir 50 mG/emtricitabine 200 mG/tenofovir alafenamide 25 mG (BIKTARVY) 1 daily    MEDICATIONS  (STANDING):  acetaminophen     Tablet .. 650 every 6 hours PRN  aluminum hydroxide/magnesium hydroxide/simethicone Suspension 30 every 4 hours PRN  baclofen 10 every 12 hours  enoxaparin Injectable 40 every 24 hours  influenza   Vaccine 0.5 once  melatonin 5 at bedtime  melatonin 3 at bedtime PRN  methocarbamol 750 every 8 hours  ondansetron Injectable 4 every 8 hours PRN  pregabalin 50 every 8 hours  QUEtiapine 150 at bedtime  QUEtiapine 50 daily      REVIEW OF SYSTEMS  [  ] ROS unobtainable because:    [ x ] All other systems negative except as noted below:	    Constitutional:  [ ] fever [ ] chills  [ ] weight loss  [ ] weakness  Skin:  [ ] rash [ ] phlebitis	  Eyes: [ ] icterus [ ] pain  [ ] discharge	  ENMT: [ ] sore throat  [ ] thrush [ ] ulcers [ ] exudates  Respiratory: [ ] dyspnea [ ] hemoptysis [ ] cough [ ] sputum	  Cardiovascular:  [ ] chest pain [ ] palpitations [ ] edema	  Gastrointestinal:  [ ] nausea [ ] vomiting [ ] diarrhea [ ] constipation [ ] pain	  Genitourinary:  [ ] dysuria [ ] frequency [ ] hematuria [ ] discharge [ ] flank pain  [ ] incontinence  Musculoskeletal:  [ ] myalgias [ ] arthralgias [ ] arthritis  [ ] back pain  Neurological:  [ ] headache [ ] seizures  [ ] confusion/altered mental status  Psychiatric:  [ ] anxiety [ ] depression	  Hematology/Lymphatics:  [ ] lymphadenopathy  Endocrine:  [ ] adrenal [ ] thyroid  Allergic/Immunologic:	 [ ] transplant [ ] seasonal    Vital Signs Last 24 Hrs  T(C): 36.8 (15 Tutu 2025 08:02), Max: 36.9 (14 Jan 2025 15:37)  T(F): 98.2 (15 Tutu 2025 08:02), Max: 98.4 (14 Jan 2025 15:37)  HR: 75 (15 Tutu 2025 08:02) (74 - 77)  BP: 112/61 (15 Tutu 2025 08:02) (112/61 - 136/87)  BP(mean): 97 (14 Jan 2025 15:37) (97 - 97)  RR: 18 (15 Tutu 2025 08:02) (18 - 18)  SpO2: 100% (15 Tutu 2025 08:02) (99% - 100%)    Parameters below as of 15 Tutu 2025 08:02  Patient On (Oxygen Delivery Method): room air        Physical Exam:  Constitutional: NAD  Head/Eyes: no icterus  LUNGS:  CTA  CVS:  regular rhythm  Abd:  soft, non-tender; non-distended  Ext:  no edema  Vascular:  IV site no erythema tenderness or discharge  Neuro: AAO X 3    Labs: all available labs reviewed                        12.4   4.43  )-----------( 221      ( 15 Tutu 2025 07:10 )             38.5     01-15    139  |  110[H]  |  12  ----------------------------<  114[H]  3.5   |  24  |  0.78    Ca    8.9      15 Tutu 2025 07:10    TPro  8.1  /  Alb  3.7  /  TBili  0.8  /  DBili  x   /  AST  47[H]  /  ALT  29  /  AlkPhos  69  01-13     LIVER FUNCTIONS - ( 13 Jan 2025 16:57 )  Alb: 3.7 g/dL / Pro: 8.1 gm/dL / ALK PHOS: 69 U/L / ALT: 29 U/L / AST: 47 U/L / GGT: x           Urinalysis Basic - ( 15 Tutu 2025 07:10 )    Color: x / Appearance: x / SG: x / pH: x  Gluc: 114 mg/dL / Ketone: x  / Bili: x / Urobili: x   Blood: x / Protein: x / Nitrite: x   Leuk Esterase: x / RBC: x / WBC x   Sq Epi: x / Non Sq Epi: x / Bacteria: x          Radiology: all available radiological tests reviewed  < from: CT Head No Cont (01.14.25 @ 16:15) >  Impression:  Unremarkable noncontrast head CT.    < end of copied text >  < from: Xray Chest 1 View-PORTABLE IMMEDIATE (Xray Chest 1 View-PORTABLE IMMEDIATE .) (01.14.25 @ 09:35) >  IMPRESSION: Negative chest.    < end of copied text >      Advanced directives addressed: full resuscitation Patient is a 34y old  Male who presents with a chief complaint of weakness (14 Jan 2025 15:56)    HPI:  33 y/o male with PMHX of HIV since birth, asthma, drug abuse, hx of GBS in the past with chronic weakness, hx of CVA and PAF as per patient s/p weakness in legs and chronic pain who presented to  with CC of worsening weakness.  Patient notes he started feeling sick ~5 days ago.  Cold sx.  Runny nose/ sore throat.  Since then, patient has had progressive weakness in legs and pain in legs and back.  Patient notes when he gets sick, his weakness usually gets worse.  He had GBS in the past and has had chronic LE weakness since then.  He also has hx of stroke due to ? AFIB (patient states was on AC but now taken off) which also led to muscle weakness.  In the ER, patient had CT head and labs which were overall unremarkable.  (14 Jan 2025 14:36)  Above HPI reviewed and reconciled with patient    34M with HIV (since birth), Drug abuse, Hx of GBS with chronic weakness, presents with worsening pain and weakness of both legs and lower back  Reports history of GBS in the past and had chronic LE weakness  Reports being compliant with Biktarvy, recently saw his ID doctor 2 weeks ago, reportedly undetectable VL  Upon review, patient had similar presentation in 7/2023, worsening chronic back pain and LE weakness. Patient had myelopathy for at least 5-7 years, with multiple physical therapy, extensive work up in the past with no evidence of no compressive myelopathy, no demyelinating disease, no other cause found on labs tests, imaging  Afebrile on admission, on RA  No leukocytosis  Cr 0.78  CXR clear  CTH negative  Neurology eval with similar exam findings, possible ?HIV myelopathy     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      Asthma      Closed fracture of multiple ribs of right side, initial encounter      Cocaine abuse      Chronic sinusitis      Homeless      GBS (Guillain [place] syndrome)      HIV disease      History of orthopedic surgery  left arm        FAMILY HISTORY:  FH: HIV infection (Mother)      Social Hx: former cocaine user, active tobacco use but denies ETOH or IVDU    Allergies  Ceclor (Unknown)  Mushrooms (Unknown)  Toradol (Swelling)  Toradol (Anaphylaxis; Swelling)    ANTIMICROBIALS (past 90 days)  MEDICATIONS  (STANDING):        ACTIVE ANTIMICROBIALS  bictegravir 50 mG/emtricitabine 200 mG/tenofovir alafenamide 25 mG (BIKTARVY) 1 daily    MEDICATIONS  (STANDING):  acetaminophen     Tablet .. 650 every 6 hours PRN  aluminum hydroxide/magnesium hydroxide/simethicone Suspension 30 every 4 hours PRN  baclofen 10 every 12 hours  enoxaparin Injectable 40 every 24 hours  influenza   Vaccine 0.5 once  melatonin 5 at bedtime  melatonin 3 at bedtime PRN  methocarbamol 750 every 8 hours  ondansetron Injectable 4 every 8 hours PRN  pregabalin 50 every 8 hours  QUEtiapine 150 at bedtime  QUEtiapine 50 daily      REVIEW OF SYSTEMS  [  ] ROS unobtainable because:    [ x ] All other systems negative except as noted below:	    Constitutional:  [ ] fever [ ] chills  [ ] weight loss  [ ] weakness  Skin:  [ ] rash [ ] phlebitis	  Eyes: [ ] icterus [ ] pain  [ ] discharge	  ENMT: [ ] sore throat  [ ] thrush [ ] ulcers [ ] exudates  Respiratory: [ ] dyspnea [ ] hemoptysis [ ] cough [ ] sputum	  Cardiovascular:  [ ] chest pain [ ] palpitations [ ] edema	  Gastrointestinal:  [ ] nausea [ ] vomiting [ ] diarrhea [ ] constipation [ ] pain	  Genitourinary:  [ ] dysuria [ ] frequency [ ] hematuria [ ] discharge [ ] flank pain  [ ] incontinence  Musculoskeletal:  [ ] myalgias [ ] arthralgias [ ] arthritis  [ ] back pain  Neurological:  [ ] headache [ ] seizures  [ ] confusion/altered mental status  Psychiatric:  [ ] anxiety [ ] depression	  Hematology/Lymphatics:  [ ] lymphadenopathy  Endocrine:  [ ] adrenal [ ] thyroid  Allergic/Immunologic:	 [ ] transplant [ ] seasonal    Vital Signs Last 24 Hrs  T(C): 36.8 (15 Tutu 2025 08:02), Max: 36.9 (14 Jan 2025 15:37)  T(F): 98.2 (15 Tutu 2025 08:02), Max: 98.4 (14 Jan 2025 15:37)  HR: 75 (15 Tutu 2025 08:02) (74 - 77)  BP: 112/61 (15 Tutu 2025 08:02) (112/61 - 136/87)  BP(mean): 97 (14 Jan 2025 15:37) (97 - 97)  RR: 18 (15 Tutu 2025 08:02) (18 - 18)  SpO2: 100% (15 Tutu 2025 08:02) (99% - 100%)    Parameters below as of 15 Tutu 2025 08:02  Patient On (Oxygen Delivery Method): room air        Physical Exam:  Constitutional: NAD  Head/Eyes: no icterus  LUNGS:  CTA  CVS:  regular rhythm  Abd:  soft, non-tender; non-distended  Ext:  no edema  Vascular:  IV site no erythema tenderness or discharge  Neuro: AAO X 3    Labs: all available labs reviewed                        12.4   4.43  )-----------( 221      ( 15 Tutu 2025 07:10 )             38.5     01-15    139  |  110[H]  |  12  ----------------------------<  114[H]  3.5   |  24  |  0.78    Ca    8.9      15 Tutu 2025 07:10    TPro  8.1  /  Alb  3.7  /  TBili  0.8  /  DBili  x   /  AST  47[H]  /  ALT  29  /  AlkPhos  69  01-13     LIVER FUNCTIONS - ( 13 Jan 2025 16:57 )  Alb: 3.7 g/dL / Pro: 8.1 gm/dL / ALK PHOS: 69 U/L / ALT: 29 U/L / AST: 47 U/L / GGT: x           Urinalysis Basic - ( 15 Tutu 2025 07:10 )    Color: x / Appearance: x / SG: x / pH: x  Gluc: 114 mg/dL / Ketone: x  / Bili: x / Urobili: x   Blood: x / Protein: x / Nitrite: x   Leuk Esterase: x / RBC: x / WBC x   Sq Epi: x / Non Sq Epi: x / Bacteria: x          Radiology: all available radiological tests reviewed  < from: CT Head No Cont (01.14.25 @ 16:15) >  Impression:  Unremarkable noncontrast head CT.    < end of copied text >  < from: Xray Chest 1 View-PORTABLE IMMEDIATE (Xray Chest 1 View-PORTABLE IMMEDIATE .) (01.14.25 @ 09:35) >  IMPRESSION: Negative chest.    < end of copied text >      Advanced directives addressed: full resuscitation

## 2025-01-16 PROCEDURE — 99233 SBSQ HOSP IP/OBS HIGH 50: CPT

## 2025-01-16 RX ADMIN — ACETAMINOPHEN, DIPHENHYDRAMINE HCL, PHENYLEPHRINE HCL 5 MILLIGRAM(S): 325; 25; 5 TABLET ORAL at 22:00

## 2025-01-16 RX ADMIN — ACETAMINOPHEN 650 MILLIGRAM(S): 160 SUSPENSION ORAL at 22:31

## 2025-01-16 RX ADMIN — PREGABALIN CAPSULES, CV 50 MILLIGRAM(S): 225 CAPSULE ORAL at 14:20

## 2025-01-16 RX ADMIN — IMMUNE GLOBULIN INFUSION (HUMAN) 62.5 GRAM(S): 100 INJECTION, SOLUTION INTRAVENOUS; SUBCUTANEOUS at 11:17

## 2025-01-16 RX ADMIN — BICTEGRAVIR SODIUM, EMTRICITABINE, AND TENOFOVIR ALAFENAMIDE FUMARATE 1 TABLET(S): 50; 200; 25 TABLET ORAL at 09:55

## 2025-01-16 RX ADMIN — Medication 750 MILLIGRAM(S): at 14:20

## 2025-01-16 RX ADMIN — PREGABALIN CAPSULES, CV 50 MILLIGRAM(S): 225 CAPSULE ORAL at 05:03

## 2025-01-16 RX ADMIN — QUETIAPINE FUMARATE 50 MILLIGRAM(S): 300 TABLET ORAL at 09:55

## 2025-01-16 RX ADMIN — Medication 750 MILLIGRAM(S): at 05:03

## 2025-01-16 RX ADMIN — Medication 10 MILLIGRAM(S): at 09:55

## 2025-01-16 RX ADMIN — Medication 50 MILLIGRAM(S): at 10:49

## 2025-01-16 RX ADMIN — ACETAMINOPHEN 650 MILLIGRAM(S): 160 SUSPENSION ORAL at 09:56

## 2025-01-16 RX ADMIN — ACETAMINOPHEN 650 MILLIGRAM(S): 160 SUSPENSION ORAL at 22:01

## 2025-01-16 RX ADMIN — QUETIAPINE FUMARATE 150 MILLIGRAM(S): 300 TABLET ORAL at 22:00

## 2025-01-16 RX ADMIN — PREGABALIN CAPSULES, CV 50 MILLIGRAM(S): 225 CAPSULE ORAL at 22:00

## 2025-01-16 RX ADMIN — Medication 10 MILLIGRAM(S): at 22:00

## 2025-01-16 RX ADMIN — Medication 750 MILLIGRAM(S): at 22:00

## 2025-01-16 NOTE — SBIRT NOTE ADULT - [id number]_GEN_A_CORE
Pt declined SBIRT/ DAST screen. Pt endorses smoking marijuana once a week and denied having any concerns. Pt states he has cut down marijuana use significantly in the past year and does not want resources at this time. Sw provided substance misuse folder and will remain available if needed./Patient refused

## 2025-01-17 LAB
AMPHET UR-MCNC: NEGATIVE — SIGNIFICANT CHANGE UP
BARBITURATES UR SCN-MCNC: NEGATIVE — SIGNIFICANT CHANGE UP
BENZODIAZ UR-MCNC: NEGATIVE — SIGNIFICANT CHANGE UP
COCAINE METAB.OTHER UR-MCNC: NEGATIVE — SIGNIFICANT CHANGE UP
FENTANYL UR QL SCN: NEGATIVE — SIGNIFICANT CHANGE UP
METHADONE UR-MCNC: NEGATIVE — SIGNIFICANT CHANGE UP
OPIATES UR-MCNC: NEGATIVE — SIGNIFICANT CHANGE UP
PCP SPEC-MCNC: SIGNIFICANT CHANGE UP
PCP UR-MCNC: NEGATIVE — SIGNIFICANT CHANGE UP
THC UR QL: POSITIVE — SIGNIFICANT CHANGE UP

## 2025-01-17 PROCEDURE — 99233 SBSQ HOSP IP/OBS HIGH 50: CPT

## 2025-01-17 PROCEDURE — 99221 1ST HOSP IP/OBS SF/LOW 40: CPT

## 2025-01-17 RX ADMIN — QUETIAPINE FUMARATE 50 MILLIGRAM(S): 300 TABLET ORAL at 09:56

## 2025-01-17 RX ADMIN — ACETAMINOPHEN 650 MILLIGRAM(S): 160 SUSPENSION ORAL at 09:57

## 2025-01-17 RX ADMIN — Medication 50 MILLIGRAM(S): at 10:33

## 2025-01-17 RX ADMIN — Medication 750 MILLIGRAM(S): at 06:00

## 2025-01-17 RX ADMIN — Medication 10 MILLIGRAM(S): at 09:56

## 2025-01-17 RX ADMIN — PREGABALIN CAPSULES, CV 50 MILLIGRAM(S): 225 CAPSULE ORAL at 22:18

## 2025-01-17 RX ADMIN — BICTEGRAVIR SODIUM, EMTRICITABINE, AND TENOFOVIR ALAFENAMIDE FUMARATE 1 TABLET(S): 50; 200; 25 TABLET ORAL at 09:56

## 2025-01-17 RX ADMIN — IMMUNE GLOBULIN INFUSION (HUMAN) 62.5 GRAM(S): 100 INJECTION, SOLUTION INTRAVENOUS; SUBCUTANEOUS at 11:42

## 2025-01-17 RX ADMIN — Medication 750 MILLIGRAM(S): at 22:17

## 2025-01-17 RX ADMIN — PREGABALIN CAPSULES, CV 50 MILLIGRAM(S): 225 CAPSULE ORAL at 14:14

## 2025-01-17 RX ADMIN — ACETAMINOPHEN, DIPHENHYDRAMINE HCL, PHENYLEPHRINE HCL 5 MILLIGRAM(S): 325; 25; 5 TABLET ORAL at 22:17

## 2025-01-17 RX ADMIN — PREGABALIN CAPSULES, CV 50 MILLIGRAM(S): 225 CAPSULE ORAL at 06:00

## 2025-01-17 RX ADMIN — QUETIAPINE FUMARATE 150 MILLIGRAM(S): 300 TABLET ORAL at 22:17

## 2025-01-17 RX ADMIN — Medication 750 MILLIGRAM(S): at 14:14

## 2025-01-17 NOTE — CONSULT NOTE ADULT - SUBJECTIVE AND OBJECTIVE BOX
34yM was admitted on 01-14    Patient is a 34y old  Male who presents with a chief complaint of weakness (16 Jan 2025 18:14)    HPI:  33 y/o male with PMHX of HIV since birth, asthma, drug abuse, hx of GBS in the past with chronic weakness, hx of CVA and PAF as per patient s/p weakness in legs and chronic pain who presented to  with CC of worsening weakness.  Patient notes he started feeling sick ~5 days ago.  Cold sx.  Runny nose/ sore throat.  Since then, patient has had progressive weakness in legs and pain in legs and back.  Patient notes when he gets sick, his weakness usually gets worse.  He had GBS in the past and has had chronic LE weakness since then.  He also has hx of stroke due to ? AFIB (patient states was on AC but now taken off) which also led to muscle weakness.  In the ER, patient had CT head and labs which were overall unremarkable.      Seen by neurology  Extensive w/u in the past. Comparing previous findings on exam about the same, with UMN signs of LEs spastic paraparesis. Imaging in the past not c/w CVA, myelitis, MS.  ? HIV myelopathy, Da 2 IVIG  Suggest:  Continue Baclofen/ methocarbamol/ lyrica.     IVIG, ( 2gms x Kg, dose divided over 3 days), pre treat with benadryl, tylenol. today 2 nd  PT  social service     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  Asthma  Closed fracture of multiple ribs of right side, initial encounter  Cocaine abuse  Chronic sinusitis  Homeless  GBS (Guillain Essex syndrome)  HIV disease  History of orthopedic surgery  left arm        FAMILY HISTORY:  FH: HIV infection (Mother)      Social History:    Hx of drug abuse.    Homeless.      Allergies:    Ceclor (Unknown)  Mushrooms (Unknown)  Toradol (Swelling)  Toradol (Anaphylaxis; Swelling)       (14 Jan 2025 14:36)      Imaging performed:      REVIEW OF SYSTEMS  Constitutional - No fever, No weight loss, No fatigue  HEENT - No eye pain, No visual disturbances, No difficulty hearing, No tinnitus, No vertigo, No neck pain  Respiratory - No cough, No wheezing, No shortness of breath  Cardiovascular - No chest pain, No palpitations  Gastrointestinal - No abdominal pain, No nausea, No vomiting, No diarrhea, No constipation  Genitourinary - No dysuria, No frequency, No hematuria, No incontinence  Neurological - No headaches, No memory loss, No loss of strength, No numbness, No tremors  Skin - No itching, No rashes, No lesions   Endocrine - No temperature intolerance  Musculoskeletal - No joint pain, No joint swelling, No muscle pain  Psychiatric - No depression, No anxiety    VITALS  T(C): 36.7 (01-17-25 @ 11:45), Max: 36.9 (01-16-25 @ 16:16)  HR: 78 (01-17-25 @ 11:45) (65 - 83)  BP: 130/86 (01-17-25 @ 11:45) (119/63 - 156/88)  RR: 18 (01-17-25 @ 11:45) (17 - 19)  SpO2: 98% (01-17-25 @ 11:45) (97% - 100%)  Wt(kg): --    PAST MEDICAL & SURGICAL HISTORY  HIV (human immunodeficiency virus infection)    Guillain BarrÃ© syndrome    Guillain-Essex    Asthma    HIV (human immunodeficiency virus infection)    CVA (cerebral vascular accident)    Closed fracture of multiple ribs of right side, initial encounter    Cocaine abuse    Chronic sinusitis    Homeless    HIV disease    HIV (human immunodeficiency virus infection)    GBS (Guillain Essex syndrome)    Guillain-Essex syndrome    HIV positive    Stroke    Guillain-Essex    GBS (Guillain Essex syndrome)    Prophylactic measure    HIV disease    History of orthopedic surgery    No significant past surgical history    No significant past surgical history        SOCIAL HISTORY - as per documentation/history  Smoking - None  EtOH - None  Drugs - None    FUNCTIONAL HISTORY  Lives   Independent    CURRENT FUNCTIONAL STATUS      FAMILY HISTORY   No pertinent family history in first degree relatives    Family history unknown    FH: HIV infection (Mother)        RECENT LABS - Reviewed              ALLERGIES  Ceclor (Unknown)  Mushrooms (Unknown)  Toradol (Swelling)  Toradol (Anaphylaxis; Swelling)      MEDICATIONS   acetaminophen     Tablet .. 650 milliGRAM(s) Oral every 6 hours PRN  acetaminophen     Tablet .. 650 milliGRAM(s) Oral daily  aluminum hydroxide/magnesium hydroxide/simethicone Suspension 30 milliLiter(s) Oral every 4 hours PRN  baclofen 10 milliGRAM(s) Oral every 12 hours  bictegravir 50 mG/emtricitabine 200 mG/tenofovir alafenamide 25 mG (BIKTARVY) 1 Tablet(s) Oral daily  diphenhydrAMINE 50 milliGRAM(s) Oral daily PRN  enoxaparin Injectable 40 milliGRAM(s) SubCutaneous every 24 hours  immune   globulin 10% (GAMMAGARD) IVPB 50 Gram(s) IV Intermittent daily  influenza   Vaccine 0.5 milliLiter(s) IntraMuscular once  melatonin 5 milliGRAM(s) Oral at bedtime  melatonin 3 milliGRAM(s) Oral at bedtime PRN  methocarbamol 750 milliGRAM(s) Oral every 8 hours  ondansetron Injectable 4 milliGRAM(s) IV Push every 8 hours PRN  pregabalin 50 milliGRAM(s) Oral every 8 hours  QUEtiapine 150 milliGRAM(s) Oral at bedtime  QUEtiapine 50 milliGRAM(s) Oral daily  sodium chloride 0.9%. 1000 milliLiter(s) IV Continuous <Continuous>      ----------------------------------------------------------------------------------------  PHYSICAL EXAM  Constitutional - NAD, Comfortable  HEENT - NCAT, EOMI  Neck - Supple, No limited ROM  Chest - Breathing comfortably, No wheezing  Cardiovascular - S1S2   Abdomen - Soft   Extremities - No C/C/E, No calf tenderness   Neurologic Exam -                    Cognitive - AAO to self, place, date, year, situation     Communication - Fluent, No dysarthria     Cranial Nerves - CN 2-12 intact     Motor - No focal deficits                    LEFT    UE - ShAB 5/5, EF 5/5, EE 5/5, WE 5/5,  5/5                    RIGHT UE - ShAB 5/5, EF 5/5, EE 5/5, WE 5/5,  5/5                    LEFT    LE - HF 5/5, KE 5/5, DF 5/5, PF 5/5                    RIGHT LE - HF 5/5, KE 5/5, DF 5/5, PF 5/5        Sensory - Intact to LT     Reflexes - DTR Intact, No primitive reflexive     Coordination - FTN intact     OculoVestibular - No saccades, No nystagmus, VOR         Balance - WNL Static  Psychiatric - Mood stable, Affect WNL  ----------------------------------------------------------------------------------------   34yM was admitted on 01-14    Patient is a 34y old  Male who presents with a chief complaint of weakness (16 Jan 2025 18:14)    HPI:  33 y/o male with PMHX of HIV since birth, asthma, drug abuse, hx of GBS in the past with chronic weakness, hx of CVA and PAF as per patient s/p weakness in legs and chronic pain who presented to  with CC of worsening weakness.  Patient notes he started feeling sick ~5 days ago.  Cold sx.  Runny nose/ sore throat.  Since then, patient has had progressive weakness in legs and pain in legs and back.  Patient notes when he gets sick, his weakness usually gets worse.  He had GBS in the past and has had chronic LE weakness since then.  He also has hx of stroke due to ? AFIB (patient states was on AC but now taken off) which also led to muscle weakness.  In the ER, patient had CT head and labs which were overall unremarkable.      Seen by neurology  Extensive w/u in the past. Comparing previous findings on exam about the same, with UMN signs of LEs spastic paraparesis. Imaging in the past not c/w CVA, myelitis, MS.  ? HIV myelopathy, Da 2 IVIG  Suggest:  Continue Baclofen/ methocarbamol/ lyrica.     IVIG, ( 2gms x Kg, dose divided over 3 days), pre treat with benadryl, tylenol. today 2 nd  PT  social service     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  Asthma  Closed fracture of multiple ribs of right side, initial encounter  Cocaine abuse  Chronic sinusitis  Homeless  GBS (Guillain Pomeroy syndrome)  HIV disease  History of orthopedic surgery  left arm        FAMILY HISTORY:  FH: HIV infection (Mother)      Social History:    Hx of drug abuse.    Homeless.      Allergies:    Ceclor (Unknown)  Mushrooms (Unknown)  Toradol (Swelling)  Toradol (Anaphylaxis; Swelling)      Imaging performed:  CT THORACIC SPINE: 11/22/2024  No acute fracture or traumatic subluxation.    CT LUMBAR SPINE: 11/22/2024  No acute fracture or traumatic subluxation.    REVIEW OF SYSTEMS  Constitutional - No fever, No weight loss, No fatigue  HEENT - No eye pain, No visual disturbances, No difficulty hearing, No tinnitus, No vertigo, No neck pain  Respiratory - No cough, No wheezing, No shortness of breath  Cardiovascular - No chest pain, No palpitations  Gastrointestinal - No abdominal pain, No nausea, No vomiting, No diarrhea, No constipation  Genitourinary - No dysuria, No frequency, No hematuria, No incontinence  Neurological - No headaches, No memory loss, No loss of strength, No numbness, No tremors  Skin - No itching, No rashes, No lesions   Endocrine - No temperature intolerance  Musculoskeletal - No joint pain, No joint swelling, No muscle pain  Psychiatric - No depression, No anxiety    VITALS  T(C): 36.7 (01-17-25 @ 11:45), Max: 36.9 (01-16-25 @ 16:16)  HR: 78 (01-17-25 @ 11:45) (65 - 83)  BP: 130/86 (01-17-25 @ 11:45) (119/63 - 156/88)  RR: 18 (01-17-25 @ 11:45) (17 - 19)  SpO2: 98% (01-17-25 @ 11:45) (97% - 100%)  Wt(kg): --    PAST MEDICAL & SURGICAL HISTORY  HIV (human immunodeficiency virus infection)    Guillain BarrÃ© syndrome    Guillain-Pomeroy    Asthma    HIV (human immunodeficiency virus infection)    CVA (cerebral vascular accident)    Closed fracture of multiple ribs of right side, initial encounter    Cocaine abuse    Chronic sinusitis    Homeless    HIV disease    HIV (human immunodeficiency virus infection)    GBS (Guillain Pomeroy syndrome)    Guillain-Pomeroy syndrome    HIV positive    Stroke    Guillain-Pomeroy    GBS (Guillain Pomeroy syndrome)    Prophylactic measure    HIV disease    History of orthopedic surgery    No significant past surgical history    No significant past surgical history        SOCIAL HISTORY - as per documentation/history  Smoking - None  EtOH - None  Drugs - None    FUNCTIONAL HISTORY  Lived in shelters      CURRENT FUNCTIONAL STATUS  Stands with CG    FAMILY HISTORY   No pertinent family history in first degree relatives    Family history unknown    FH: HIV infection (Mother)    RECENT LABS - Reviewed    ALLERGIES  Ceclor (Unknown)  Mushrooms (Unknown)  Toradol (Swelling)  Toradol (Anaphylaxis; Swelling)    MEDICATIONS   acetaminophen     Tablet .. 650 milliGRAM(s) Oral every 6 hours PRN  acetaminophen     Tablet .. 650 milliGRAM(s) Oral daily  aluminum hydroxide/magnesium hydroxide/simethicone Suspension 30 milliLiter(s) Oral every 4 hours PRN  baclofen 10 milliGRAM(s) Oral every 12 hours  bictegravir 50 mG/emtricitabine 200 mG/tenofovir alafenamide 25 mG (BIKTARVY) 1 Tablet(s) Oral daily  diphenhydrAMINE 50 milliGRAM(s) Oral daily PRN  enoxaparin Injectable 40 milliGRAM(s) SubCutaneous every 24 hours  immune   globulin 10% (GAMMAGARD) IVPB 50 Gram(s) IV Intermittent daily  influenza   Vaccine 0.5 milliLiter(s) IntraMuscular once  melatonin 5 milliGRAM(s) Oral at bedtime  melatonin 3 milliGRAM(s) Oral at bedtime PRN  methocarbamol 750 milliGRAM(s) Oral every 8 hours  ondansetron Injectable 4 milliGRAM(s) IV Push every 8 hours PRN  pregabalin 50 milliGRAM(s) Oral every 8 hours  QUEtiapine 150 milliGRAM(s) Oral at bedtime  QUEtiapine 50 milliGRAM(s) Oral daily  sodium chloride 0.9%. 1000 milliLiter(s) IV Continuous <Continuous>      ----------------------------------------------------------------------------------------  PHYSICAL EXAM  Constitutional - NAD, Comfortable  HEENT - NCAT, EOMI  Neck - Supple, No limited ROM  Chest - Breathing comfortably, No wheezing  Cardiovascular - S1S2   Abdomen - Soft   Extremities - No C/C/E, No calf tenderness   Neurologic Exam -                    Cognitive - AAO to self, place, date, year, situation     Communication - Fluent, No dysarthria     Cranial Nerves - CN 2-12 intact     Motor - No focal deficits                    LEFT    UE - ShAB 4/5, EF 4/5, EE 4/5, WE 4/5,  4/5                    RIGHT UE - ShAB 5/5, EF 5/5, EE 5/5, WE 5/5,  5/5                    LEFT    LE - HF 2+/5, KE 2+/5, DF 2+/5, PF 2+/5                    RIGHT LE - HF 2+/5, KE 2+/5, DF 2+/5, PF 2+/5        Sensory - Intact to LT     Reflexes - DTR Intact, No primitive reflexive     Coordination - FTN intact     OculoVestibular - No saccades, No nystagmus, VOR         Balance - WNL Static  Psychiatric - Mood stable, Affect WNL  ----------------------------------------------------------------------------------------

## 2025-01-17 NOTE — CONSULT NOTE ADULT - ASSESSMENT
ASSESSMENT/PLAN  34yMale with functional deficits after  Pain - Tylenol  DVT PPX - SCDs  Rehab -     pending evaluation     Recommend ACUTE inpatient rehabilitation for the functional deficits consisting of 3 hours of therapy/day & 24 hour RN/daily PMR physician for comorbid medical management. Patient will be able to tolerate 3 hours a day.   Recommend [facility], patient DOES NOT meet acute inpatient rehabilitation criteria. Patient needs a more prolonged stay to achieve transition to community.    Expect patient to achieve functional goals for DC HOME with OUTPATIENT   Expect patient to achieve functional goals for DC HOME with HOME CARE   Follow up with [facility] - Call [phone] for an appointment    Will continue to follow. Functional progress will determine ongoing rehab dispo recommendations, which may change.    Continue bedside therapy as well as OOB throughout the day with mobilization by staff to maintain cardiopulmonary function and prevention of secondary complications related to debility.      ASSESSMENT/PLAN  34yMale with functional deficits after lower extremity weakness and spasticity- comnpleted 3 rd dose of IVIG today. His legs feel stronger. He wants to return to a shelter. He has lived in the shelter environment for the last eight years.   Patient ambulates 60 feet with a rolling walker  Pain - Tylenol  DVT PPX - SCDs  Rehab -     The patient is not a candidate for acute inpatient rehabilitation.  The patient is able to stand with close supervision  From functional stand point he should be able to go to a shelter when medically stable. He should be able to ambulate with a rolling walker at distant supervision.   Patient states he cannot return home with his mother.     Will continue to follow. Functional progress will determine ongoing rehab dispo recommendations, which may change.    Continue bedside therapy as well as OOB throughout the day with mobilization by staff to maintain cardiopulmonary function and prevention of secondary complications related to debility.

## 2025-01-18 LAB
ANION GAP SERPL CALC-SCNC: 5 MMOL/L — SIGNIFICANT CHANGE UP (ref 5–17)
BASOPHILS # BLD AUTO: 0.02 K/UL — SIGNIFICANT CHANGE UP (ref 0–0.2)
BASOPHILS NFR BLD AUTO: 0.5 % — SIGNIFICANT CHANGE UP (ref 0–2)
BUN SERPL-MCNC: 16 MG/DL — SIGNIFICANT CHANGE UP (ref 7–23)
CALCIUM SERPL-MCNC: 9.2 MG/DL — SIGNIFICANT CHANGE UP (ref 8.5–10.1)
CHLORIDE SERPL-SCNC: 107 MMOL/L — SIGNIFICANT CHANGE UP (ref 96–108)
CO2 SERPL-SCNC: 22 MMOL/L — SIGNIFICANT CHANGE UP (ref 22–31)
CREAT SERPL-MCNC: 0.97 MG/DL — SIGNIFICANT CHANGE UP (ref 0.5–1.3)
EGFR: 104 ML/MIN/1.73M2 — SIGNIFICANT CHANGE UP
EOSINOPHIL # BLD AUTO: 0.05 K/UL — SIGNIFICANT CHANGE UP (ref 0–0.5)
EOSINOPHIL NFR BLD AUTO: 1.2 % — SIGNIFICANT CHANGE UP (ref 0–6)
GLUCOSE SERPL-MCNC: 115 MG/DL — HIGH (ref 70–99)
HCT VFR BLD CALC: 36.6 % — LOW (ref 39–50)
HGB BLD-MCNC: 12.6 G/DL — LOW (ref 13–17)
IMM GRANULOCYTES NFR BLD AUTO: 0.2 % — SIGNIFICANT CHANGE UP (ref 0–0.9)
LYMPHOCYTES # BLD AUTO: 2.37 K/UL — SIGNIFICANT CHANGE UP (ref 1–3.3)
LYMPHOCYTES # BLD AUTO: 58.8 % — HIGH (ref 13–44)
MCHC RBC-ENTMCNC: 30.4 PG — SIGNIFICANT CHANGE UP (ref 27–34)
MCHC RBC-ENTMCNC: 34.4 G/DL — SIGNIFICANT CHANGE UP (ref 32–36)
MCV RBC AUTO: 88.2 FL — SIGNIFICANT CHANGE UP (ref 80–100)
MONOCYTES # BLD AUTO: 0.37 K/UL — SIGNIFICANT CHANGE UP (ref 0–0.9)
MONOCYTES NFR BLD AUTO: 9.2 % — SIGNIFICANT CHANGE UP (ref 2–14)
NEUTROPHILS # BLD AUTO: 1.21 K/UL — LOW (ref 1.8–7.4)
NEUTROPHILS NFR BLD AUTO: 30.1 % — LOW (ref 43–77)
PLATELET # BLD AUTO: 237 K/UL — SIGNIFICANT CHANGE UP (ref 150–400)
POTASSIUM SERPL-MCNC: 3.5 MMOL/L — SIGNIFICANT CHANGE UP (ref 3.5–5.3)
POTASSIUM SERPL-SCNC: 3.5 MMOL/L — SIGNIFICANT CHANGE UP (ref 3.5–5.3)
RBC # BLD: 4.15 M/UL — LOW (ref 4.2–5.8)
RBC # FLD: 12.6 % — SIGNIFICANT CHANGE UP (ref 10.3–14.5)
SODIUM SERPL-SCNC: 134 MMOL/L — LOW (ref 135–145)
WBC # BLD: 4.03 K/UL — SIGNIFICANT CHANGE UP (ref 3.8–10.5)
WBC # FLD AUTO: 4.03 K/UL — SIGNIFICANT CHANGE UP (ref 3.8–10.5)

## 2025-01-18 PROCEDURE — 99232 SBSQ HOSP IP/OBS MODERATE 35: CPT

## 2025-01-18 RX ADMIN — ACETAMINOPHEN 650 MILLIGRAM(S): 160 SUSPENSION ORAL at 09:37

## 2025-01-18 RX ADMIN — Medication 750 MILLIGRAM(S): at 13:17

## 2025-01-18 RX ADMIN — PREGABALIN CAPSULES, CV 50 MILLIGRAM(S): 225 CAPSULE ORAL at 13:17

## 2025-01-18 RX ADMIN — ACETAMINOPHEN, DIPHENHYDRAMINE HCL, PHENYLEPHRINE HCL 5 MILLIGRAM(S): 325; 25; 5 TABLET ORAL at 21:33

## 2025-01-18 RX ADMIN — BICTEGRAVIR SODIUM, EMTRICITABINE, AND TENOFOVIR ALAFENAMIDE FUMARATE 1 TABLET(S): 50; 200; 25 TABLET ORAL at 09:06

## 2025-01-18 RX ADMIN — Medication 10 MILLIGRAM(S): at 00:32

## 2025-01-18 RX ADMIN — Medication 10 MILLIGRAM(S): at 21:34

## 2025-01-18 RX ADMIN — Medication 750 MILLIGRAM(S): at 21:34

## 2025-01-18 RX ADMIN — Medication 750 MILLIGRAM(S): at 05:35

## 2025-01-18 RX ADMIN — PREGABALIN CAPSULES, CV 50 MILLIGRAM(S): 225 CAPSULE ORAL at 21:49

## 2025-01-18 RX ADMIN — QUETIAPINE FUMARATE 50 MILLIGRAM(S): 300 TABLET ORAL at 09:07

## 2025-01-18 RX ADMIN — ACETAMINOPHEN 650 MILLIGRAM(S): 160 SUSPENSION ORAL at 09:07

## 2025-01-18 RX ADMIN — PREGABALIN CAPSULES, CV 50 MILLIGRAM(S): 225 CAPSULE ORAL at 05:35

## 2025-01-18 RX ADMIN — Medication 10 MILLIGRAM(S): at 09:06

## 2025-01-18 RX ADMIN — QUETIAPINE FUMARATE 150 MILLIGRAM(S): 300 TABLET ORAL at 21:33

## 2025-01-18 NOTE — OCCUPATIONAL THERAPY INITIAL EVALUATION ADULT - LIVES WITH, PROFILE
Elderly mother however reports he is uncomfortable living there and will live in a shelter upon d/c/parents

## 2025-01-18 NOTE — OCCUPATIONAL THERAPY INITIAL EVALUATION ADULT - WEIGHT-BEARING RESTRICTIONS: STAND/SIT, REHAB EVAL
Detail Level: Detailed Patient Specific Counseling (Will Not Stick From Patient To Patient): AN recommended PD for excision. OS submitted. Patient Specific Counseling (Will Not Stick From Patient To Patient): Recommend 3 unit excision with AN. Detail Level: Zone full weight-bearing

## 2025-01-18 NOTE — OCCUPATIONAL THERAPY INITIAL EVALUATION ADULT - PERTINENT HX OF CURRENT PROBLEM, REHAB EVAL
34yM with PMH of HIV, polysubstance abuse presents to the ED c/o gen weakness, malaise and difficulty walking. Pt is concerned he may need to go to [facility]. Pt with chronic weakness, nothing acute with non focal exam and HD stable. Anastasiya Pardo from  who reccomends hold in the ED until the morning for PT consult, basic labs. Reassess. No concern for GBS or organic cause of pt's weakness likely 2/2 to deconditioning.

## 2025-01-19 LAB
ANION GAP SERPL CALC-SCNC: 4 MMOL/L — LOW (ref 5–17)
BASOPHILS # BLD AUTO: 0.03 K/UL — SIGNIFICANT CHANGE UP (ref 0–0.2)
BASOPHILS NFR BLD AUTO: 0.7 % — SIGNIFICANT CHANGE UP (ref 0–2)
BUN SERPL-MCNC: 13 MG/DL — SIGNIFICANT CHANGE UP (ref 7–23)
CALCIUM SERPL-MCNC: 8.8 MG/DL — SIGNIFICANT CHANGE UP (ref 8.5–10.1)
CHLORIDE SERPL-SCNC: 106 MMOL/L — SIGNIFICANT CHANGE UP (ref 96–108)
CO2 SERPL-SCNC: 23 MMOL/L — SIGNIFICANT CHANGE UP (ref 22–31)
CREAT SERPL-MCNC: 0.89 MG/DL — SIGNIFICANT CHANGE UP (ref 0.5–1.3)
EGFR: 115 ML/MIN/1.73M2 — SIGNIFICANT CHANGE UP
EOSINOPHIL # BLD AUTO: 0.03 K/UL — SIGNIFICANT CHANGE UP (ref 0–0.5)
EOSINOPHIL NFR BLD AUTO: 0.7 % — SIGNIFICANT CHANGE UP (ref 0–6)
GLUCOSE SERPL-MCNC: 98 MG/DL — SIGNIFICANT CHANGE UP (ref 70–99)
HCT VFR BLD CALC: 35.6 % — LOW (ref 39–50)
HGB BLD-MCNC: 12.4 G/DL — LOW (ref 13–17)
IMM GRANULOCYTES NFR BLD AUTO: 0.7 % — SIGNIFICANT CHANGE UP (ref 0–0.9)
LYMPHOCYTES # BLD AUTO: 2.45 K/UL — SIGNIFICANT CHANGE UP (ref 1–3.3)
LYMPHOCYTES # BLD AUTO: 59 % — HIGH (ref 13–44)
MCHC RBC-ENTMCNC: 30.5 PG — SIGNIFICANT CHANGE UP (ref 27–34)
MCHC RBC-ENTMCNC: 34.8 G/DL — SIGNIFICANT CHANGE UP (ref 32–36)
MCV RBC AUTO: 87.5 FL — SIGNIFICANT CHANGE UP (ref 80–100)
MONOCYTES # BLD AUTO: 0.41 K/UL — SIGNIFICANT CHANGE UP (ref 0–0.9)
MONOCYTES NFR BLD AUTO: 9.9 % — SIGNIFICANT CHANGE UP (ref 2–14)
NEUTROPHILS # BLD AUTO: 1.2 K/UL — LOW (ref 1.8–7.4)
NEUTROPHILS NFR BLD AUTO: 29 % — LOW (ref 43–77)
PLATELET # BLD AUTO: 236 K/UL — SIGNIFICANT CHANGE UP (ref 150–400)
POTASSIUM SERPL-MCNC: 3.8 MMOL/L — SIGNIFICANT CHANGE UP (ref 3.5–5.3)
POTASSIUM SERPL-SCNC: 3.8 MMOL/L — SIGNIFICANT CHANGE UP (ref 3.5–5.3)
RBC # BLD: 4.07 M/UL — LOW (ref 4.2–5.8)
RBC # FLD: 12.5 % — SIGNIFICANT CHANGE UP (ref 10.3–14.5)
SODIUM SERPL-SCNC: 133 MMOL/L — LOW (ref 135–145)
WBC # BLD: 4.15 K/UL — SIGNIFICANT CHANGE UP (ref 3.8–10.5)
WBC # FLD AUTO: 4.15 K/UL — SIGNIFICANT CHANGE UP (ref 3.8–10.5)

## 2025-01-19 PROCEDURE — 99232 SBSQ HOSP IP/OBS MODERATE 35: CPT

## 2025-01-19 RX ORDER — BACTERIOSTATIC SODIUM CHLORIDE 0.9 %
1 VIAL (ML) INJECTION
Refills: 0 | Status: DISCONTINUED | OUTPATIENT
Start: 2025-01-19 | End: 2025-01-20

## 2025-01-19 RX ADMIN — QUETIAPINE FUMARATE 50 MILLIGRAM(S): 300 TABLET ORAL at 09:28

## 2025-01-19 RX ADMIN — Medication 750 MILLIGRAM(S): at 05:50

## 2025-01-19 RX ADMIN — PREGABALIN CAPSULES, CV 50 MILLIGRAM(S): 225 CAPSULE ORAL at 05:49

## 2025-01-19 RX ADMIN — PREGABALIN CAPSULES, CV 50 MILLIGRAM(S): 225 CAPSULE ORAL at 13:28

## 2025-01-19 RX ADMIN — BICTEGRAVIR SODIUM, EMTRICITABINE, AND TENOFOVIR ALAFENAMIDE FUMARATE 1 TABLET(S): 50; 200; 25 TABLET ORAL at 09:28

## 2025-01-19 RX ADMIN — Medication 750 MILLIGRAM(S): at 13:28

## 2025-01-19 RX ADMIN — Medication 10 MILLIGRAM(S): at 21:12

## 2025-01-19 RX ADMIN — Medication 10 MILLIGRAM(S): at 09:28

## 2025-01-19 RX ADMIN — Medication 1 GRAM(S): at 09:28

## 2025-01-19 RX ADMIN — Medication 1 GRAM(S): at 21:12

## 2025-01-19 RX ADMIN — PREGABALIN CAPSULES, CV 50 MILLIGRAM(S): 225 CAPSULE ORAL at 21:11

## 2025-01-19 RX ADMIN — QUETIAPINE FUMARATE 150 MILLIGRAM(S): 300 TABLET ORAL at 21:11

## 2025-01-19 RX ADMIN — ACETAMINOPHEN, DIPHENHYDRAMINE HCL, PHENYLEPHRINE HCL 5 MILLIGRAM(S): 325; 25; 5 TABLET ORAL at 21:12

## 2025-01-19 RX ADMIN — Medication 750 MILLIGRAM(S): at 21:12

## 2025-01-19 NOTE — PROGRESS NOTE ADULT - TIME-BASED BILLING (NON-CRITICAL CARE)
Follow up Dr. Samanta Ralph in 1-4 week, [phone]     Leads to be removed on Tuesday   No bath with the leads
Time-based billing (NON-critical care)

## 2025-01-19 NOTE — PROGRESS NOTE ADULT - SUBJECTIVE AND OBJECTIVE BOX
HPI:  35 y/o man PMHX of HIV since birth, asthma, drug abuse, hx of GBS in the past with chronic weakness         MEDICATIONS  (STANDING):  baclofen 10 milliGRAM(s) Oral every 12 hours  bictegravir 50 mG/emtricitabine 200 mG/tenofovir alafenamide 25 mG (BIKTARVY) 1 Tablet(s) Oral daily  enoxaparin Injectable 40 milliGRAM(s) SubCutaneous every 24 hours  influenza   Vaccine 0.5 milliLiter(s) IntraMuscular once  melatonin 5 milliGRAM(s) Oral at bedtime  methocarbamol 750 milliGRAM(s) Oral every 8 hours  pregabalin 50 milliGRAM(s) Oral every 8 hours  QUEtiapine 150 milliGRAM(s) Oral at bedtime  QUEtiapine 50 milliGRAM(s) Oral daily  sodium chloride 0.9%. 1000 milliLiter(s) (75 mL/Hr) IV Continuous <Continuous>    MEDICATIONS  (PRN):  acetaminophen     Tablet .. 650 milliGRAM(s) Oral every 6 hours PRN Temp greater or equal to 38C (100.4F), Mild Pain (1 - 3)  aluminum hydroxide/magnesium hydroxide/simethicone Suspension 30 milliLiter(s) Oral every 4 hours PRN Dyspepsia  melatonin 3 milliGRAM(s) Oral at bedtime PRN Insomnia  ondansetron Injectable 4 milliGRAM(s) IV Push every 8 hours PRN Nausea and/or Vomiting        Vital Signs Last 24 Hrs  T(C): 36.8 (15 Tutu 2025 08:02), Max: 36.9 (14 Jan 2025 15:37)  T(F): 98.2 (15 Tutu 2025 08:02), Max: 98.4 (14 Jan 2025 15:37)  HR: 75 (15 Tutu 2025 08:02) (74 - 77)  BP: 112/61 (15 Tutu 2025 08:02) (112/61 - 136/87)  BP(mean): 97 (14 Jan 2025 15:37) (97 - 97)  RR: 18 (15 Tutu 2025 08:02) (18 - 18)  SpO2: 100% (15 Tutu 2025 08:02) (99% - 100%)    Parameters below as of 15 Tuut 2025 08:02  Patient On (Oxygen Delivery Method): room air      Neurological Exam:    MS: AOx3. Appropriately interactive, normal affect. Speech fluent w/o paraphasic error, repetition, naming intact   CN: VFFTC, PERLL, EOMI, V1-3 sensation intact, face symmetric, hearing intact, palate elevates symmetrically, tongue midline, SCM equal bilaterally  Motor: increased tone LEs, UES no drift, 5-/5 bilat. LES pros ~ 3/5, distal 4/5, c/o pain   Sens: reduced distally light touch distally in the legs, decreased JPS in feet.    Reflexes: 2-3/4 all over, + clonus LLE, Upgoing toes b/l  Coord:  No dysmetria of UE       Complete Blood Count (01.15.25 @ 07:10)   WBC Count: 4.43: Test Repeated K/uL  RBC Count: 4.20 M/uL  Hemoglobin: 12.4 g/dL  Hematocrit: 38.5 %  Mean Cell Volume: 91.7 fl  Mean Cell Hemoglobin: 29.5 pg  Mean Cell Hemoglobin Conc: 32.2 g/dL  Red Cell Distrib Width: 12.5 %  Platelet Count - Automated: 221 K/uL    Basic Metabolic Panel (01.15.25 @ 07:10)   Sodium: 139 mmol/L  Potassium: 3.5 mmol/L  Chloride: 110 mmol/L  Carbon Dioxide: 24 mmol/L  Anion Gap: 5 mmol/L  Blood Urea Nitrogen: 12 mg/dL  Creatinine: 0.78 mg/dL  Glucose: 114 mg/dL  Calcium: 8.9 mg/dL  eGFR: 119:T Cell Subset (01.14.25 @ 18:11)   CD3 %: 77 %  ABS CD3: 1920 cells/uL  CD4 %: 18 %  ABS CD4: 460 cells/uL  CD8 %: 56 %  ABS CD8: 1388 cells/uL  4/8 Ratio: 0.33 RATIO      Vitamin B12, Serum: 731 pg/mL (01.14.25 @ 18:11)   Sedimentation Rate, Erythrocyte: 43 mm/hr (01.14.25 @ 18:11) < from: CT Head No Cont (01.14.25 @ 16:15) >    Technique:  Multiple axial sections were acquired from the base of the skull to the   vertex without contrast enhancement. Coronal and sagittal reconstructed   images were performed as well.    Findings:  The lateral ventricles have a normal configuration.    There is no evidence of acute hemorrhage, mass or mass-effect in the   posterior fossa or in the supratentorial region.    Evaluation of the osseous structures with the appropriate window appears   unremarkable.    The visualized paranasal sinuses mastoid and middle ear regions appear   clear.    Impression:  Unremarkable noncontrast head CT.    < end of copied text >  < from: CT Thoracic Spine No Cont (11.22.24 @ 03:02) >    MISCELLANEOUS:  None.      IMPRESSION:    CT THORACIC SPINE:  No acute fracture or traumatic subluxation.    CT LUMBAR SPINE:  No acute fracture or traumatic subluxation.    < end of copied text >  
Patient is seen and examined. Chart is reviewed. Still having difficulty ambulating.     Gen: No fever, chills, weakness  ENT: No visual changes or throat pain  Neck: No pain or stiffness  Respiratory: No cough or wheezing  Cardiovascular: No chest pain or palpitations  Gastrointestinal: No abdominal pain, nausea, vomiting, constipation, or diarrhea  Hematologic: No easy bleeding or bruising  Neurologic: No numbness or focal weakness  Psych: No depression or insomnia  Skin: No rash or itching      MEDICATIONS  (STANDING):  acetaminophen     Tablet .. 650 milliGRAM(s) Oral daily  baclofen 10 milliGRAM(s) Oral every 12 hours  bictegravir 50 mG/emtricitabine 200 mG/tenofovir alafenamide 25 mG (BIKTARVY) 1 Tablet(s) Oral daily  enoxaparin Injectable 40 milliGRAM(s) SubCutaneous every 24 hours  immune   globulin 10% (GAMMAGARD) IVPB 50 Gram(s) IV Intermittent daily  influenza   Vaccine 0.5 milliLiter(s) IntraMuscular once  melatonin 5 milliGRAM(s) Oral at bedtime  methocarbamol 750 milliGRAM(s) Oral every 8 hours  pregabalin 50 milliGRAM(s) Oral every 8 hours  QUEtiapine 150 milliGRAM(s) Oral at bedtime  QUEtiapine 50 milliGRAM(s) Oral daily  sodium chloride 0.9%. 1000 milliLiter(s) (75 mL/Hr) IV Continuous <Continuous>    MEDICATIONS  (PRN):  acetaminophen     Tablet .. 650 milliGRAM(s) Oral every 6 hours PRN Temp greater or equal to 38C (100.4F), Mild Pain (1 - 3)  aluminum hydroxide/magnesium hydroxide/simethicone Suspension 30 milliLiter(s) Oral every 4 hours PRN Dyspepsia  diphenhydrAMINE 50 milliGRAM(s) Oral daily PRN Rash and/or Itching  melatonin 3 milliGRAM(s) Oral at bedtime PRN Insomnia  ondansetron Injectable 4 milliGRAM(s) IV Push every 8 hours PRN Nausea and/or Vomiting      Vital Signs Last 24 Hrs  T(C): 36.7 (17 Jan 2025 11:45), Max: 36.9 (16 Jan 2025 16:16)  T(F): 98.1 (17 Jan 2025 11:45), Max: 98.4 (16 Jan 2025 16:16)  HR: 78 (17 Jan 2025 11:45) (65 - 83)  BP: 130/86 (17 Jan 2025 11:45) (119/63 - 156/88)  BP(mean): 105 (16 Jan 2025 20:02) (105 - 105)  RR: 18 (17 Jan 2025 11:45) (17 - 19)  SpO2: 98% (17 Jan 2025 11:45) (97% - 100%)    Parameters below as of 17 Jan 2025 11:45  Patient On (Oxygen Delivery Method): room air      GENERAL: NAD, lying in bed comfortably  HEAD:  Atraumatic, Normocephalic  EYES: conjunctiva and sclera clear  ENT: Moist mucous membranes  NECK: Supple, No JVD  CHEST/LUNG: Clear to auscultation bilaterally; No rales, rhonchi, wheezing. Unlabored respirations  HEART: Regular rate and rhythm; No murmurs  ABDOMEN: Bowel sounds present; Soft, Nontender, Nondistended.   EXTREMITIES:  2+ Peripheral Pulses, brisk capillary refill. No clubbing, cyanosis, or edema  NERVOUS SYSTEM:  Alert & Oriented X3,   Neurologic:  weakness in legs.  3/5.  pain with movement.  spasm.  Musculoskeletal: unable to walk.  stiffness.          LABS:                CAPILLARY BLOOD GLUCOSE                RADIOLOGY:        
Patient is seen and examined. Chart is reviewed. on IV Ig for Myelopathy. still has LE weakness.     Gen: No fever, chills, weakness  ENT: No visual changes or throat pain  Neck: No pain or stiffness  Respiratory: No cough or wheezing  Cardiovascular: No chest pain or palpitations  Gastrointestinal: No abdominal pain, nausea, vomiting, constipation, or diarrhea  Hematologic: No easy bleeding or bruising  Neurologic: No numbness or focal weakness  Psych: No depression or insomnia  Skin: No rash or itching      MEDICATIONS  (STANDING):  acetaminophen     Tablet .. 650 milliGRAM(s) Oral daily  baclofen 10 milliGRAM(s) Oral every 12 hours  bictegravir 50 mG/emtricitabine 200 mG/tenofovir alafenamide 25 mG (BIKTARVY) 1 Tablet(s) Oral daily  enoxaparin Injectable 40 milliGRAM(s) SubCutaneous every 24 hours  immune   globulin 10% (GAMMAGARD) IVPB 50 Gram(s) IV Intermittent daily  influenza   Vaccine 0.5 milliLiter(s) IntraMuscular once  melatonin 5 milliGRAM(s) Oral at bedtime  methocarbamol 750 milliGRAM(s) Oral every 8 hours  pregabalin 50 milliGRAM(s) Oral every 8 hours  QUEtiapine 150 milliGRAM(s) Oral at bedtime  QUEtiapine 50 milliGRAM(s) Oral daily  sodium chloride 0.9%. 1000 milliLiter(s) (75 mL/Hr) IV Continuous <Continuous>    MEDICATIONS  (PRN):  acetaminophen     Tablet .. 650 milliGRAM(s) Oral every 6 hours PRN Temp greater or equal to 38C (100.4F), Mild Pain (1 - 3)  aluminum hydroxide/magnesium hydroxide/simethicone Suspension 30 milliLiter(s) Oral every 4 hours PRN Dyspepsia  diphenhydrAMINE 50 milliGRAM(s) Oral daily PRN Rash and/or Itching  melatonin 3 milliGRAM(s) Oral at bedtime PRN Insomnia  ondansetron Injectable 4 milliGRAM(s) IV Push every 8 hours PRN Nausea and/or Vomiting      Vital Signs Last 24 Hrs  T(C): 36.9 (16 Jan 2025 16:16), Max: 36.9 (16 Jan 2025 16:16)  T(F): 98.4 (16 Jan 2025 16:16), Max: 98.4 (16 Jan 2025 16:16)  HR: 81 (16 Jan 2025 17:17) (57 - 92)  BP: 136/92 (16 Jan 2025 17:17) (117/74 - 144/81)  BP(mean): --  RR: 18 (16 Jan 2025 17:17) (18 - 19)  SpO2: 100% (16 Jan 2025 17:17) (96% - 100%)    Parameters below as of 16 Jan 2025 17:17  Patient On (Oxygen Delivery Method): room air      GENERAL: NAD, lying in bed comfortably  HEAD:  Atraumatic, Normocephalic  EYES: conjunctiva and sclera clear  ENT: Moist mucous membranes  NECK: Supple, No JVD  CHEST/LUNG: Clear to auscultation bilaterally; No rales, rhonchi, wheezing. Unlabored respirations  HEART: Regular rate and rhythm; No murmurs  ABDOMEN: Bowel sounds present; Soft, Nontender, Nondistended.   EXTREMITIES:  2+ Peripheral Pulses, brisk capillary refill. No clubbing, cyanosis, or edema  NERVOUS SYSTEM:  Alert & Oriented X3,   Neurologic:  weakness in legs.  3/5.  barely able to lift legs off bed.  pain with movement.  spasm.  Musculoskeletal: unable to walk.  stiffness.        LABS:                          12.4   4.43  )-----------( 221      ( 15 Tutu 2025 07:10 )             38.5     15 Tutu 2025 07:10    139    |  110    |  12     ----------------------------<  114    3.5     |  24     |  0.78     Ca    8.9        15 Tutu 2025 07:10          CAPILLARY BLOOD GLUCOSE            Urinalysis Basic - ( 15 Tutu 2025 07:10 )    Color: x / Appearance: x / SG: x / pH: x  Gluc: 114 mg/dL / Ketone: x  / Bili: x / Urobili: x   Blood: x / Protein: x / Nitrite: x   Leuk Esterase: x / RBC: x / WBC x   Sq Epi: x / Non Sq Epi: x / Bacteria: x        RADIOLOGY:        
Date of Service:01-16-25 @ 12:36  Interval History/ROS: Afebrile overnight, comfortable on RA, CD4 count above 400, but VL detectable at 200, reports compliance to biktarvy, discussed importance of compliance      REVIEW OF SYSTEMS  [  ] ROS unobtainable because:    [ x ] All other systems negative except as noted below    Constitutional:  [ ] fever [ ] chills  [ ] weight loss  [ ]night sweat  [ ]poor appetite/PO intake [ ]fatigue   Skin:  [ ] rash [ ] phlebitis	  Eyes: [ ] icterus [ ] pain  [ ] discharge	  ENMT: [ ] sore throat  [ ] thrush [ ] ulcers [ ] exudates [ ]anosmia  Respiratory: [ ] dyspnea [ ] hemoptysis [ ] cough [ ] sputum	  Cardiovascular:  [ ] chest pain [ ] palpitations [ ] edema	  Gastrointestinal:  [ ] nausea [ ] vomiting [ ] diarrhea [ ] constipation [ ] pain	  Genitourinary:  [ ] dysuria [ ] frequency [ ] hematuria [ ] discharge [ ] flank pain  [ ] incontinence  Musculoskeletal:  [ ] myalgias [ ] arthralgias [ ] arthritis  [ ] back pain  Neurological:  [ ] headache [ ] weakness [ ] seizures  [ ] confusion/altered mental status    Allergies  Ceclor (Unknown)  Mushrooms (Unknown)  Toradol (Swelling)  Toradol (Anaphylaxis; Swelling)        ANTIMICROBIALS:    bictegravir 50 mG/emtricitabine 200 mG/tenofovir alafenamide 25 mG (BIKTARVY) 1 daily        OTHER MEDS: MEDICATIONS  (STANDING):  acetaminophen     Tablet .. 650 every 6 hours PRN  acetaminophen     Tablet .. 650 daily  aluminum hydroxide/magnesium hydroxide/simethicone Suspension 30 every 4 hours PRN  baclofen 10 every 12 hours  diphenhydrAMINE 50 daily PRN  enoxaparin Injectable 40 every 24 hours  immune   globulin 10% (GAMMAGARD) IVPB 50 daily  influenza   Vaccine 0.5 once  melatonin 5 at bedtime  melatonin 3 at bedtime PRN  methocarbamol 750 every 8 hours  ondansetron Injectable 4 every 8 hours PRN  pregabalin 50 every 8 hours  QUEtiapine 150 at bedtime  QUEtiapine 50 daily      Vital Signs Last 24 Hrs  T(F): 98.1 (01-16-25 @ 07:38), Max: 98.4 (01-14-25 @ 15:37)    Vital Signs Last 24 Hrs  HR: 79 (01-16-25 @ 12:15) (57 - 92)  BP: 138/80 (01-16-25 @ 12:15) (117/74 - 142/88)  RR: 18 (01-16-25 @ 12:15)  SpO2: 97% (01-16-25 @ 12:15) (96% - 100%)  Wt(kg): --    EXAM:    Constitutional: NAD  Head/Eyes: no icterus  LUNGS:  CTA  CVS:  regular rhythm  Abd:  soft, non-tender; non-distended  Ext:  no edema  Vascular:  IV site no erythema tenderness or discharge  Neuro: AAO X 3    Labs:                        12.4   4.43  )-----------( 221      ( 15 Tutu 2025 07:10 )             38.5     01-15    139  |  110[H]  |  12  ----------------------------<  114[H]  3.5   |  24  |  0.78    Ca    8.9      15 Tutu 2025 07:10        WBC Trend:  WBC Count: 4.43 (01-15-25 @ 07:10)  WBC Count: 7.48 (01-13-25 @ 16:57)      Creatine Trend:  Creatinine: 0.78 (01-15)  Creatinine: 0.71 (01-13)      Liver Biochemical Testing Trend:  Alanine Aminotransferase (ALT/SGPT): 29 (01-13)  Alanine Aminotransferase (ALT/SGPT): 19 (11-22)  Alanine Aminotransferase (ALT/SGPT): 27 (10-20)  Alanine Aminotransferase (ALT/SGPT): 29 (09-13)  Alanine Aminotransferase (ALT/SGPT): 14 (08-13)  Aspartate Aminotransferase (AST/SGOT): 47 (01-13-25 @ 16:57)  Aspartate Aminotransferase (AST/SGOT): 25 (11-22-24 @ 00:24)  Aspartate Aminotransferase (AST/SGOT): 42 (10-20-24 @ 23:24)  Aspartate Aminotransferase (AST/SGOT): 37 (09-13-24 @ 17:27)  Aspartate Aminotransferase (AST/SGOT): 41 (08-13-24 @ 16:15)  Bilirubin Total: 0.8 (01-13)  Bilirubin Total: 0.5 (11-22)  Bilirubin Total: 0.5 (10-20)  Bilirubin Total: 0.3 (09-13)  Bilirubin Total: 0.3 (08-13)      Trend LDH  07-18-23 @ 00:40  169  03-01-22 @ 03:45  235[H]      Urinalysis Basic - ( 15 Tutu 2025 07:10 )    Color: x / Appearance: x / SG: x / pH: x  Gluc: 114 mg/dL / Ketone: x  / Bili: x / Urobili: x   Blood: x / Protein: x / Nitrite: x   Leuk Esterase: x / RBC: x / WBC x   Sq Epi: x / Non Sq Epi: x / Bacteria: x        MICROBIOLOGY:        Culture - Urine (collected 18 Jul 2024 21:45)  Source: Clean Catch Clean Catch (Midstream)  Final Report:    <10,000 CFU/mL Normal Urogenital Jeimy    Culture - Blood (collected 18 Jul 2024 20:02)  Source: .Blood Blood-Venous  Final Report:    No growth at 5 days    Culture - Blood (collected 18 Jul 2024 19:40)  Source: .Blood Blood-Venous  Final Report:    No growth at 5 days    Culture - CSF with Gram Stain (collected 08 May 2024 04:46)  Source: .CSF CSF.lumbar  Final Report:    No growth at 5 days    Culture - Urine (collected 08 May 2024 03:52)  Source: Clean Catch Clean Catch (Midstream)  Final Report:    <10,000 CFU/mL Normal Urogenital Jeimy    Culture - Urine (collected 04 Oct 2023 07:15)  Source: Clean Catch None  Final Report:    <10,000 CFU/mL Normal Urogenital Jeimy    Culture - Urine (collected 03 Sep 2023 07:20)  Source: Clean Catch Clean Catch (Midstream)  Final Report:    <10,000 CFU/mL Normal Urogenital Jeimy    Culture - Blood (collected 18 Jul 2023 00:45)  Source: .Blood Blood-Peripheral  Final Report:    No growth at 5 days    Culture - Blood (collected 18 Jul 2023 00:40)  Source: .Blood Blood-Peripheral  Final Report:    No growth at 5 days      HIV-1/2 Combo Result: Reactive (05-08-24 @ 11:35)    ABS CD4: 460 cells/uL (01-14-25 @ 18:11)  ABS CD4: 199 cells/uL (05-08-24 @ 11:35)  ABS CD4: 276 cells/uL (09-22-23 @ 06:27)  ABS CD4: 157 cells/uL (06-19-23 @ 07:58)  ABS CD4: 105 cells/uL (06-19-23 @ 06:46)    HIV-1 RNA Quantitative, Viral Load: 208 (01-14-25 @ 18:11)  HIV-1 Viral Load Result: DET. (01-14-25 @ 18:11)  HIV-1 Viral Load Result: DET. (05-08-24 @ 11:35)  HIV-1 RNA Quantitative, Viral Load: 2,607 (05-08-24 @ 11:35)  HIV-1 RNA Quantitative, Viral Load: 220 (09-22-23 @ 06:27)    C-Reactive Protein: 13.8 (01-14)    Sedimentation Rate, Erythrocyte: 43 mm/hr (01-14-25 @ 18:11)  Sedimentation Rate, Erythrocyte: 30 mm/hr (05-08-24 @ 11:35)      RADIOLOGY:  imaging below personally reviewed    CT Head No Cont:   ACC: [id number] EXAM:  CT BRAIN   ORDERED BY: MIRIAM SWEENEY     PROCEDURE DATE:  01/14/2025          INTERPRETATION:  CLINICAL INFORMATION: weakness    COMPARISON: Head CT November 21, 2023.    Technique:  Multiple axial sections were acquired from the base of the skull to the   vertex without contrast enhancement. Coronal and sagittal reconstructed   images were performed as well.    Findings:  The lateral ventricles have a normal configuration.    There is no evidence of acute hemorrhage, mass or mass-effect in the   posterior fossa or in the supratentorial region.    Evaluation of the osseous structures with the appropriate window appears   unremarkable.    The visualized paranasal sinuses mastoid and middle ear regions appear   clear.    Impression:  Unremarkable noncontrast head CT.    --- End of Report ---    TEODORO ROBERTSON MD; Attending Radiologist  This document has been electronically signed. Jan 14 2025  4:17PM (01-14-25 @ 16:15)      
Patient is seen and examined. Chart is reviewed. no new complain today.    Gen: No fever, chills, weakness  ENT: No visual changes or throat pain  Neck: No pain or stiffness  Respiratory: No cough or wheezing  Cardiovascular: No chest pain or palpitations  Gastrointestinal: No abdominal pain, nausea, vomiting, constipation, or diarrhea  Hematologic: No easy bleeding or bruising  Neurologic: No numbness or focal weakness  Psych: No depression or insomnia  Skin: No rash or itching      MEDICATIONS  (STANDING):  baclofen 10 milliGRAM(s) Oral every 12 hours  bictegravir 50 mG/emtricitabine 200 mG/tenofovir alafenamide 25 mG (BIKTARVY) 1 Tablet(s) Oral daily  enoxaparin Injectable 40 milliGRAM(s) SubCutaneous every 24 hours  influenza   Vaccine 0.5 milliLiter(s) IntraMuscular once  melatonin 5 milliGRAM(s) Oral at bedtime  methocarbamol 750 milliGRAM(s) Oral every 8 hours  pregabalin 50 milliGRAM(s) Oral every 8 hours  QUEtiapine 150 milliGRAM(s) Oral at bedtime  QUEtiapine 50 milliGRAM(s) Oral daily  sodium chloride 1 Gram(s) Oral two times a day    MEDICATIONS  (PRN):  acetaminophen     Tablet .. 650 milliGRAM(s) Oral every 6 hours PRN Temp greater or equal to 38C (100.4F), Mild Pain (1 - 3)  aluminum hydroxide/magnesium hydroxide/simethicone Suspension 30 milliLiter(s) Oral every 4 hours PRN Dyspepsia  diphenhydrAMINE 50 milliGRAM(s) Oral daily PRN Rash and/or Itching  melatonin 3 milliGRAM(s) Oral at bedtime PRN Insomnia  ondansetron Injectable 4 milliGRAM(s) IV Push every 8 hours PRN Nausea and/or Vomiting      Vital Signs Last 24 Hrs  T(C): 36.4 (19 Jan 2025 07:59), Max: 36.8 (18 Jan 2025 15:47)  T(F): 97.5 (19 Jan 2025 07:59), Max: 98.2 (18 Jan 2025 15:47)  HR: 79 (19 Jan 2025 07:59) (79 - 86)  BP: 121/74 (19 Jan 2025 07:59) (121/74 - 131/81)  BP(mean): --  RR: 18 (19 Jan 2025 07:59) (17 - 19)  SpO2: 96% (19 Jan 2025 07:59) (96% - 98%)    Parameters below as of 19 Jan 2025 07:59  Patient On (Oxygen Delivery Method): room air      GENERAL: NAD, lying in bed comfortably  HEAD:  Atraumatic, Normocephalic  EYES: conjunctiva and sclera clear  ENT: Moist mucous membranes  NECK: Supple, No JVD  CHEST/LUNG: Clear to auscultation bilaterally; No rales, rhonchi, wheezing. Unlabored respirations  HEART: Regular rate and rhythm; No murmurs  ABDOMEN: Bowel sounds present; Soft, Nontender, Nondistended.   EXTREMITIES:  2+ Peripheral Pulses, brisk capillary refill. No clubbing, cyanosis, or edema  NERVOUS SYSTEM:  Alert & Oriented X3,   Neurologic:  weakness in legs.  3/5.  pain with movement.  spasm.  Musculoskeletal: unsteady with walk.  stiffness.        LABS:                          12.4   4.15  )-----------( 236      ( 19 Jan 2025 07:32 )             35.6     19 Jan 2025 07:32    133    |  106    |  13     ----------------------------<  98     3.8     |  23     |  0.89     Ca    8.8        19 Jan 2025 07:32          CAPILLARY BLOOD GLUCOSE            Urinalysis Basic - ( 19 Jan 2025 07:32 )    Color: x / Appearance: x / SG: x / pH: x  Gluc: 98 mg/dL / Ketone: x  / Bili: x / Urobili: x   Blood: x / Protein: x / Nitrite: x   Leuk Esterase: x / RBC: x / WBC x   Sq Epi: x / Non Sq Epi: x / Bacteria: x        RADIOLOGY:        
HPI:  35 y/o man PMHX of HIV since birth, asthma, drug abuse, myelopathy, ADM c/o weakness in legs and chronic pain         MEDICATIONS  (STANDING):  acetaminophen     Tablet .. 650 milliGRAM(s) Oral daily  baclofen 10 milliGRAM(s) Oral every 12 hours  bictegravir 50 mG/emtricitabine 200 mG/tenofovir alafenamide 25 mG (BIKTARVY) 1 Tablet(s) Oral daily  enoxaparin Injectable 40 milliGRAM(s) SubCutaneous every 24 hours  immune   globulin 10% (GAMMAGARD) IVPB 50 Gram(s) IV Intermittent daily  influenza   Vaccine 0.5 milliLiter(s) IntraMuscular once  melatonin 5 milliGRAM(s) Oral at bedtime  methocarbamol 750 milliGRAM(s) Oral every 8 hours  pregabalin 50 milliGRAM(s) Oral every 8 hours  QUEtiapine 150 milliGRAM(s) Oral at bedtime  QUEtiapine 50 milliGRAM(s) Oral daily  sodium chloride 0.9%. 1000 milliLiter(s) (75 mL/Hr) IV Continuous <Continuous>    MEDICATIONS  (PRN):  acetaminophen     Tablet .. 650 milliGRAM(s) Oral every 6 hours PRN Temp greater or equal to 38C (100.4F), Mild Pain (1 - 3)  aluminum hydroxide/magnesium hydroxide/simethicone Suspension 30 milliLiter(s) Oral every 4 hours PRN Dyspepsia  diphenhydrAMINE 50 milliGRAM(s) Oral daily PRN Rash and/or Itching  melatonin 3 milliGRAM(s) Oral at bedtime PRN Insomnia  ondansetron Injectable 4 milliGRAM(s) IV Push every 8 hours PRN Nausea and/or Vomiting    Vital Signs Last 24 Hrs  T(C): 36.7 (16 Jan 2025 07:38), Max: 36.8 (15 Tutu 2025 19:45)  T(F): 98.1 (16 Jan 2025 07:38), Max: 98.2 (15 Tutu 2025 19:45)  HR: 79 (16 Jan 2025 12:15) (57 - 92)  BP: 138/80 (16 Jan 2025 12:15) (117/74 - 142/88)  BP(mean): --  RR: 18 (16 Jan 2025 12:15) (18 - 19)  SpO2: 97% (16 Jan 2025 12:15) (96% - 100%)    Parameters below as of 16 Jan 2025 12:15  Patient On (Oxygen Delivery Method): room air      Neurological Exam:    HF: Patient is alert and oriented x 3. There is no aphasia or dysarthria. Follows complex commands.     CN: Vision is intact to confrontation. Pupils are equal and reactive. Extra ocular muscles are intact. There is no facial droop or asymmetry. Tongue is midline. Sensation is intact in the face. Other CN II-XII are intact.     Motor: paraparesis, UE 5-/5, LE ~ 2/5.     Sensory: intact to touch.     DTR: 2-3/4 all 4 extremities. Babinski is negative bilateral.    Co-ord:  Finger to finger to nose is intact.     Basic Metabolic Panel (01.15.25 @ 07:10)   Sodium: 139 mmol/L  Potassium: 3.5 mmol/L  Chloride: 110 mmol/L  Carbon Dioxide: 24 mmol/L  Anion Gap: 5 mmol/L  Blood Urea Nitrogen: 12 mg/dL  Creatinine: 0.78 mg/dL  Glucose: 114 mg/dL  Calcium: 8.9 mg/dL  eGFR: 119:     < from: CT Head No Cont (01.14.25 @ 16:15) >      INTERPRETATION:  CLINICAL INFORMATION: weakness    COMPARISON: Head CT November 21, 2023.    Technique:  Multiple axial sections were acquired from the base of the skull to the   vertex without contrast enhancement. Coronal and sagittal reconstructed   images were performed as well.    Findings:  The lateral ventricles have a normal configuration.    There is no evidence of acute hemorrhage, mass or mass-effect in the   posterior fossa or in the supratentorial region.    Evaluation of the osseous structures with the appropriate window appears   unremarkable.    The visualized paranasal sinuses mastoid and middle ear regions appear   clear.    Impression:  Unremarkable noncontrast head CT.    < end of copied text >    < from: CT Thoracic Spine No Cont (11.22.24 @ 03:02) >  IMPRESSION:    CT THORACIC SPINE:  No acute fracture or traumatic subluxation.    CT LUMBAR SPINE:  No acute fracture or traumatic subluxation.    < end of copied text >  
Patient is seen and examined. Chart is reviewed. No new complain today. he is slowly making progress with his ambulation.    Gen: No fever, chills, weakness  ENT: No visual changes or throat pain  Neck: No pain or stiffness  Respiratory: No cough or wheezing  Cardiovascular: No chest pain or palpitations  Gastrointestinal: No abdominal pain, nausea, vomiting, constipation, or diarrhea  Hematologic: No easy bleeding or bruising  Neurologic: No numbness or focal weakness  Psych: No depression or insomnia  Skin: No rash or itching      MEDICATIONS  (STANDING):  baclofen 10 milliGRAM(s) Oral every 12 hours  bictegravir 50 mG/emtricitabine 200 mG/tenofovir alafenamide 25 mG (BIKTARVY) 1 Tablet(s) Oral daily  enoxaparin Injectable 40 milliGRAM(s) SubCutaneous every 24 hours  influenza   Vaccine 0.5 milliLiter(s) IntraMuscular once  melatonin 5 milliGRAM(s) Oral at bedtime  methocarbamol 750 milliGRAM(s) Oral every 8 hours  pregabalin 50 milliGRAM(s) Oral every 8 hours  QUEtiapine 150 milliGRAM(s) Oral at bedtime  QUEtiapine 50 milliGRAM(s) Oral daily    MEDICATIONS  (PRN):  acetaminophen     Tablet .. 650 milliGRAM(s) Oral every 6 hours PRN Temp greater or equal to 38C (100.4F), Mild Pain (1 - 3)  aluminum hydroxide/magnesium hydroxide/simethicone Suspension 30 milliLiter(s) Oral every 4 hours PRN Dyspepsia  diphenhydrAMINE 50 milliGRAM(s) Oral daily PRN Rash and/or Itching  melatonin 3 milliGRAM(s) Oral at bedtime PRN Insomnia  ondansetron Injectable 4 milliGRAM(s) IV Push every 8 hours PRN Nausea and/or Vomiting      Vital Signs Last 24 Hrs  T(C): 36.8 (18 Jan 2025 15:47), Max: 36.8 (18 Jan 2025 15:47)  T(F): 98.2 (18 Jan 2025 15:47), Max: 98.2 (18 Jan 2025 15:47)  HR: 86 (18 Jan 2025 15:47) (74 - 86)  BP: 130/74 (18 Jan 2025 15:47) (114/64 - 130/74)  BP(mean): --  RR: 19 (18 Jan 2025 15:47) (18 - 19)  SpO2: 98% (18 Jan 2025 15:47) (96% - 99%)    Parameters below as of 18 Jan 2025 15:47  Patient On (Oxygen Delivery Method): room air        GENERAL: NAD, lying in bed comfortably  HEAD:  Atraumatic, Normocephalic  EYES: conjunctiva and sclera clear  ENT: Moist mucous membranes  NECK: Supple, No JVD  CHEST/LUNG: Clear to auscultation bilaterally; No rales, rhonchi, wheezing. Unlabored respirations  HEART: Regular rate and rhythm; No murmurs  ABDOMEN: Bowel sounds present; Soft, Nontender, Nondistended.   EXTREMITIES:  2+ Peripheral Pulses, brisk capillary refill. No clubbing, cyanosis, or edema  NERVOUS SYSTEM:  Alert & Oriented X3,   Neurologic:  weakness in legs.  3/5.  pain with movement.  spasm.  Musculoskeletal: unable to walk.  stiffness.        LABS:                          12.6   4.03  )-----------( 237      ( 18 Jan 2025 08:14 )             36.6     18 Jan 2025 08:14    134    |  107    |  16     ----------------------------<  115    3.5     |  22     |  0.97     Ca    9.2        18 Jan 2025 08:14          CAPILLARY BLOOD GLUCOSE            Urinalysis Basic - ( 18 Jan 2025 08:14 )    Color: x / Appearance: x / SG: x / pH: x  Gluc: 115 mg/dL / Ketone: x  / Bili: x / Urobili: x   Blood: x / Protein: x / Nitrite: x   Leuk Esterase: x / RBC: x / WBC x   Sq Epi: x / Non Sq Epi: x / Bacteria: x        RADIOLOGY:        
Patient is seen and examined. Chart is reviewed. still has LE weakness.     Gen: No fever, chills, weakness  ENT: No visual changes or throat pain  Neck: No pain or stiffness  Respiratory: No cough or wheezing  Cardiovascular: No chest pain or palpitations  Gastrointestinal: No abdominal pain, nausea, vomiting, constipation, or diarrhea  Hematologic: No easy bleeding or bruising  Neurologic: No numbness or focal weakness  Psych: No depression or insomnia  Skin: No rash or itching      MEDICATIONS  (STANDING):  acetaminophen     Tablet .. 650 milliGRAM(s) Oral daily  baclofen 10 milliGRAM(s) Oral every 12 hours  bictegravir 50 mG/emtricitabine 200 mG/tenofovir alafenamide 25 mG (BIKTARVY) 1 Tablet(s) Oral daily  enoxaparin Injectable 40 milliGRAM(s) SubCutaneous every 24 hours  immune   globulin 10% (GAMMAGARD) IVPB 50 Gram(s) IV Intermittent daily  influenza   Vaccine 0.5 milliLiter(s) IntraMuscular once  melatonin 5 milliGRAM(s) Oral at bedtime  methocarbamol 750 milliGRAM(s) Oral every 8 hours  pregabalin 50 milliGRAM(s) Oral every 8 hours  QUEtiapine 150 milliGRAM(s) Oral at bedtime  QUEtiapine 50 milliGRAM(s) Oral daily  sodium chloride 0.9%. 1000 milliLiter(s) (75 mL/Hr) IV Continuous <Continuous>    MEDICATIONS  (PRN):  acetaminophen     Tablet .. 650 milliGRAM(s) Oral every 6 hours PRN Temp greater or equal to 38C (100.4F), Mild Pain (1 - 3)  aluminum hydroxide/magnesium hydroxide/simethicone Suspension 30 milliLiter(s) Oral every 4 hours PRN Dyspepsia  diphenhydrAMINE 50 milliGRAM(s) Oral daily PRN Rash and/or Itching  melatonin 3 milliGRAM(s) Oral at bedtime PRN Insomnia  ondansetron Injectable 4 milliGRAM(s) IV Push every 8 hours PRN Nausea and/or Vomiting      Vital Signs Last 24 Hrs  T(C): 36.8 (15 Tutu 2025 08:02), Max: 36.9 (14 Jan 2025 17:12)  T(F): 98.2 (15 Tutu 2025 08:02), Max: 98.4 (14 Jan 2025 17:12)  HR: 75 (15 Tutu 2025 08:02) (74 - 77)  BP: 112/61 (15 Tutu 2025 08:02) (112/61 - 136/87)  BP(mean): --  RR: 18 (15 Tutu 2025 08:02) (18 - 18)  SpO2: 100% (15 Tutu 2025 08:02) (99% - 100%)    Parameters below as of 15 Tutu 2025 08:02  Patient On (Oxygen Delivery Method): room air      PHYSICAL EXAM:  GENERAL: NAD, lying in bed comfortably  HEAD:  Atraumatic, Normocephalic  EYES: conjunctiva and sclera clear  ENT: Moist mucous membranes  NECK: Supple, No JVD  CHEST/LUNG: Clear to auscultation bilaterally; No rales, rhonchi, wheezing. Unlabored respirations  HEART: Regular rate and rhythm; No murmurs  ABDOMEN: Bowel sounds present; Soft, Nontender, Nondistended.   EXTREMITIES:  2+ Peripheral Pulses, brisk capillary refill. No clubbing, cyanosis, or edema  NERVOUS SYSTEM:  Alert & Oriented X3,   Neurologic:  weakness in legs.  3/5.  barely able to lift legs off bed.  pain with movement.  spasm.  Musculoskeletal: unable to walk.  stiffness.              LABS:                          12.4   4.43  )-----------( 221      ( 15 Tutu 2025 07:10 )             38.5     15 Tutu 2025 07:10    139    |  110    |  12     ----------------------------<  114    3.5     |  24     |  0.78     Ca    8.9        15 Tutu 2025 07:10    TPro  8.1    /  Alb  3.7    /  TBili  0.8    /  DBili  x      /  AST  47     /  ALT  29     /  AlkPhos  69     13 Jan 2025 16:57    LIVER FUNCTIONS - ( 13 Jan 2025 16:57 )  Alb: 3.7 g/dL / Pro: 8.1 gm/dL / ALK PHOS: 69 U/L / ALT: 29 U/L / AST: 47 U/L / GGT: x             CAPILLARY BLOOD GLUCOSE            Urinalysis Basic - ( 15 Tutu 2025 07:10 )    Color: x / Appearance: x / SG: x / pH: x  Gluc: 114 mg/dL / Ketone: x  / Bili: x / Urobili: x   Blood: x / Protein: x / Nitrite: x   Leuk Esterase: x / RBC: x / WBC x   Sq Epi: x / Non Sq Epi: x / Bacteria: x        RADIOLOGY:

## 2025-01-19 NOTE — PROGRESS NOTE ADULT - ASSESSMENT
33 y/o man RH, PMHX of HIV since birth, asthma, drug abuse, hx of chronic LEs weakness, long standing weakness of the legs, LB spasms. Extensive w/u in the past. Comparing previous findings on exam about the same, with UMN signs of LEs spastic paraparesis. Imaging in the past not c/w CVA, myelitis, MS.  ? HIV myelopathy, Da 2 IVIG  Suggest:  Continue Baclofen/ methocarbamol/ lyrica.     IVIG, ( 2gms x Kg, dose divided over 3 days), pre treat with benadryl, tylenol. today 2 nd  PT  social service 
A/P:    #Weakness/ pain:    Admit to medsurg.    Patient with hx of GBS and CVA in the past.    Patient states worsening sx for last 5 days.    CT head negative.    Check MRI brain with and without salinas.  R/o CVA, demyelination, HIV related dz.  Unclear how well controlled HIV.    Check TSH/ ESR/ CRP/ B12/ folate.    PT eval.    IV Ig as per Neuro     #HIV:    Cont home meds.    Check t cells and viral load.    Ongoing since birth-- obtained from mother.      #Chronic weakness/ spasm/ GBS/ CVA:    Cont hoem meds.    Baclofen/ methocarbamol/ lyrica.      #Psych:    Cont seroquel.      #DVT Proph:  lovenox.      
35 y/o man RH, PMHX of HIV since birth, asthma, drug abuse, hx of chronic LEs weakness, long standing weakness of the legs, LB spasms. Extensive w/u in the past. Comparing previous findings on exam about the same, with UMN signs of LEs spastic paraparesis. Imaging in the past not c/w CVA, myelitis, MS.  poss HIV myelopathy,   Suggest:  Continue Baclofen/ methocarbamol/ lyrica.     IVIG, ( 2gms x Kg, dose divided over 3 days), pre treat with benadryl, tylenol
A/P:    #Weakness/ pain:    Patient with hx of GBS and CVA in the past.    Patient states worsening sx for last 5 days.    CT head negative.    IV Ig as per Neuro -completed 3 days therapy   as per PT/OT eval need [facility] placement     #HIV:    Cont home meds.    Check t cells and viral load.    Ongoing since birth-- obtained from mother.      #Chronic weakness/ spasm/ GBS/ CVA:    Cont heme meds.    Baclofen/ methocarbamol/ lyrica.      #Psych:    Cont seroquel.      #DVT Proph:  lovenox.      Disposition: need [facility] placement,  service is aware. 
A/P:    #Weakness/ pain:    Patient with hx of GBS and CVA in the past.    Patient states worsening sx for last 5 days.    CT head negative.    MRI brain with and without salinas-pending .    PT eval.    IV Ig as per Neuro     #HIV:    Cont home meds.    Check t cells and viral load.    Ongoing since birth-- obtained from mother.      #Chronic weakness/ spasm/ GBS/ CVA:    Cont hoem meds.    Baclofen/ methocarbamol/ lyrica.      #Psych:    Cont seroquel.      #DVT Proph:  lovenox.      
Assessment:  34M with HIV (since birth), Drug abuse, Hx of GBS with chronic weakness, presents with worsening pain and weakness of both legs and lower back  Reports history of GBS in the past and had chronic LE weakness  Reports being compliant with Biktarvy, recently saw his ID doctor 2 weeks ago, reportedly undetectable VL  Upon review, patient had similar presentation in 7/2023, worsening chronic back pain and LE weakness. Patient had myelopathy for at least 5-7 years, with multiple physical therapy, extensive work up in the past with no evidence of no compressive myelopathy, no demyelinating disease, no other cause found on labs tests, imaging  Afebrile on admission, on RA  No leukocytosis  Cr 0.78  CXR clear  CTH negative  EMR with VL 2K and  1 year ago (5/2024)  Neurology eval with similar exam findings, possible ?HIV myelopathy   HIV VL 1/14 with 200  CD4 count 1/14 with 460    Antimicrobials:  bictegravir 50 mG/emtricitabine 200 mG/tenofovir alafenamide 25 mG (BIKTARVY) 1 daily    Impression:   #HIV  #Bilateral LE Weakness  #Chronic Back Pain    Recommendations:  - continue Biktarvy 1tab daily  - monitor temperature curve  - trend WBC  - Prior cultures reviewed. An epidemiologic assessment was performed. There is a significant risk for resistant microorganisms to spread to family members, and/or healthcare staff. The patient will be placed on isolation according to infection control policy. Will reconsider isolation measures based on new culture results and other clinical data as appropriate Appropriate cultures collected and an appropriate broad spectrum antibiotic therapy will be considered  - long discussion regarding importance of compliance and adherance to HAART, close follow up with outpatient ID clinic at [facility]  - no opposition for IVIG on ID standpoint  - Neurology appreciated  - rest per primary team    HH Token not applicable
A/P:    #Weakness/ pain:    Patient with hx of GBS and CVA in the past.    Patient states worsening sx for last 5 days.    CT head negative.    IV Ig as per Neuro -completed 3 days therapy   as per PT/OT eval need [facility] placement     #HIV:    Cont home meds.    Check t cells and viral load.    Ongoing since birth-- obtained from mother.      #Chronic weakness/ spasm/ GBS/ CVA:    Cont heme meds.    Baclofen/ methocarbamol/ lyrica.      #Psych:    Cont seroquel.      #DVT Proph:  lovenox.      Disposition: need [facility] placement,  service is aware 
A/P:    #Weakness/ pain:    Patient with hx of GBS and CVA in the past.    Patient states worsening sx for last 5 days.    CT head negative.    MRI brain with and without salinas-pending .    PT eval.    IV Ig as per Neuro     #HIV:    Cont home meds.    Check t cells and viral load.    Ongoing since birth-- obtained from mother.      #Chronic weakness/ spasm/ GBS/ CVA:    Cont heme meds.    Baclofen/ methocarbamol/ lyrica.      #Psych:    Cont seroquel.      #DVT Proph:  lovenox.

## 2025-01-19 NOTE — PROGRESS NOTE ADULT - TIME BILLING
- Reviewing, and interpreting labs and testing.  - Independently obtaining a review of systems and performing a physical exam  - Reviewing consultant documentation/recommendations in addition to discussing plan of care with consultants.  - Counselling and educating patient and family regarding interpretation of aforementioned items and plan of care.

## 2025-01-20 ENCOUNTER — TRANSCRIPTION ENCOUNTER (OUTPATIENT)
Age: 35
End: 2025-01-20

## 2025-01-20 VITALS
RESPIRATION RATE: 17 BRPM | TEMPERATURE: 98 F | SYSTOLIC BLOOD PRESSURE: 125 MMHG | HEART RATE: 80 BPM | OXYGEN SATURATION: 100 % | DIASTOLIC BLOOD PRESSURE: 70 MMHG

## 2025-01-20 PROCEDURE — 99239 HOSP IP/OBS DSCHRG MGMT >30: CPT

## 2025-01-20 RX ADMIN — ACETAMINOPHEN 650 MILLIGRAM(S): 160 SUSPENSION ORAL at 09:17

## 2025-01-20 RX ADMIN — BICTEGRAVIR SODIUM, EMTRICITABINE, AND TENOFOVIR ALAFENAMIDE FUMARATE 1 TABLET(S): 50; 200; 25 TABLET ORAL at 09:16

## 2025-01-20 RX ADMIN — QUETIAPINE FUMARATE 50 MILLIGRAM(S): 300 TABLET ORAL at 09:17

## 2025-01-20 RX ADMIN — ACETAMINOPHEN 650 MILLIGRAM(S): 160 SUSPENSION ORAL at 10:00

## 2025-01-20 RX ADMIN — Medication 750 MILLIGRAM(S): at 05:33

## 2025-01-20 RX ADMIN — PREGABALIN CAPSULES, CV 50 MILLIGRAM(S): 225 CAPSULE ORAL at 05:33

## 2025-01-20 RX ADMIN — Medication 1 GRAM(S): at 09:16

## 2025-01-20 RX ADMIN — Medication 10 MILLIGRAM(S): at 09:16

## 2025-01-20 NOTE — DISCHARGE NOTE PROVIDER - ATTENDING DISCHARGE PHYSICAL EXAMINATION:
VITALS:  T(F): 97.7 (01-20-25 @ 08:05), Max: 97.7 (01-19-25 @ 15:13)  HR: 80 (01-20-25 @ 08:05) (68 - 84)  BP: 125/70 (01-20-25 @ 08:05) (115/91 - 136/74)  RR: 17 (01-20-25 @ 08:05) (16 - 17)  SpO2: 100% (01-20-25 @ 08:05) (100% - 100%)  Wt(kg): --    I&O's Summary    19 Jan 2025 07:01  -  20 Jan 2025 07:00  --------------------------------------------------------  IN: 0 mL / OUT: 2 mL / NET: -2 mL        CAPILLARY BLOOD GLUCOSE          PHYSICAL EXAM:  Gen: No acute distress  HEENT:  Normocephalic/Atraumatic  CV:  +S1, +S2, regular, no murmurs or rubs  RESP:   lungs clear to auscultation bilaterally, no wheezing, rales, rhonchi  GI:  abdomen soft, non-tender, non-distended  EXT:  no clubbing, no cyanosis, no edema

## 2025-01-20 NOTE — DISCHARGE NOTE PROVIDER - CARE PROVIDER_API CALL
KIRBY VAZQUEZ DO  [address] [place] [address] [place] [address]  Phone: [phone]  Fax: [phone]  Established Patient  Follow Up Time: 1 week

## 2025-01-20 NOTE — DISCHARGE NOTE PROVIDER - NSDCMRMEDTOKEN_GEN_ALL_CORE_FT
Albuterol (Eqv-Proventil HFA) 90 mcg/inh inhalation aerosol: 2 puff(s) inhaled every 6 hours as needed for  shortness of breath and/or wheezing  baclofen 10 mg oral tablet: 1 tab(s) orally every 12 hours  bictegravir/emtricitabine/tenofovir 50 mg-200 mg-25 mg oral tablet: 1 tab(s) orally once a day MDD: 1 tablet  fluticasone 50 mcg/inh nasal spray: 2 spray(s) in each nostril once a day as needed for  allergy symptoms  melatonin 5 mg oral tablet: 1 tab(s) orally once a day (at bedtime)  methocarbamol 750 mg oral tablet: 1 tab(s) orally every 8 hours  pregabalin 50 mg oral capsule: 1 cap(s) orally every 8 hours  QUEtiapine 150 mg oral tablet: 1 tab(s) orally once a day (at bedtime) **also takes 50mg qAM, TDD: 200mg  QUEtiapine 50 mg oral tablet: 1 tab(s) orally once a day **also takes 150mg qHS, TDD: 200mg  sodium chloride nasal: 1 spray(s) in each nostril once a day as needed for  allergy symptoms

## 2025-01-20 NOTE — DISCHARGE NOTE NURSING/CASE MANAGEMENT/SOCIAL WORK - PATIENT PORTAL LINK FT
You can access the FollowMyHealth Patient Portal offered by [facility] by registering at the following website: [email or web address]. By joining [employer]’s FollowMyHealth portal, you will also be able to view your health information using other applications (apps) compatible with our system.

## 2025-01-20 NOTE — DISCHARGE NOTE NURSING/CASE MANAGEMENT/SOCIAL WORK - NSDCVIVACCINE_GEN_ALL_CORE_FT
Tdap; 09-Jul-2022 05:12; Annabelle Curiel (RN); [employer]; [id number] (Exp. Date: 11-Mar-2024); IntraMuscular; Deltoid Right.; 0.5 milliLiter(s); VIS (VIS Published: 09-May-2013, VIS Presented: 09-Jul-2022);

## 2025-01-20 NOTE — DISCHARGE NOTE PROVIDER - HOSPITAL COURSE
33 y/o male with PMHX of HIV since birth, asthma, drug abuse, hx of GBS in the past with chronic weakness, hx of CVA and PAF as per patient s/p weakness in legs and chronic pain who presented to  with CC of worsening weakness.     #Weakness/ pain:    Patient with hx of GBS and CVA in the past.  CT head negative.    s/p IV Ig x 3 days as per Neuro    as per PT/OT eval need [facility] placement     #HIV:    Cont home meds.    Ongoing since birth-- obtained from mother.      #Chronic weakness/ spasm/ GBS/ CVA:    Cont heme meds.    Baclofen/ methocarbamol/ lyrica.      #Psych:    Cont seroquel.      Disposition: PT recommending [facility] placement, however no accepting facilities. The patient has requested to be discharged home. He states that he will be staying with his aunt.

## 2025-01-27 DIAGNOSIS — Z59.01 SHELTERED HOMELESSNESS: ICD-10-CM

## 2025-01-27 DIAGNOSIS — Z86.73 PERSONAL HISTORY OF TRANSIENT ISCHEMIC ATTACK (TIA), AND CEREBRAL INFARCTION WITHOUT RESIDUAL DEFICITS: ICD-10-CM

## 2025-01-27 DIAGNOSIS — R25.2 CRAMP AND SPASM: ICD-10-CM

## 2025-01-27 DIAGNOSIS — Z87.898 PERSONAL HISTORY OF OTHER SPECIFIED CONDITIONS: ICD-10-CM

## 2025-01-27 DIAGNOSIS — I48.0 PAROXYSMAL ATRIAL FIBRILLATION: ICD-10-CM

## 2025-01-27 DIAGNOSIS — R29.898 OTHER SYMPTOMS AND SIGNS INVOLVING THE MUSCULOSKELETAL SYSTEM: ICD-10-CM

## 2025-01-27 DIAGNOSIS — Z21 ASYMPTOMATIC HUMAN IMMUNODEFICIENCY VIRUS [HIV] INFECTION STATUS: ICD-10-CM

## 2025-01-27 DIAGNOSIS — G89.29 OTHER CHRONIC PAIN: ICD-10-CM

## 2025-01-27 DIAGNOSIS — G95.9 DISEASE OF SPINAL CORD, UNSPECIFIED: ICD-10-CM

## 2025-01-27 DIAGNOSIS — J45.909 UNSPECIFIED ASTHMA, UNCOMPLICATED: ICD-10-CM

## 2025-01-27 SDOH — ECONOMIC STABILITY - HOUSING INSECURITY: SHELTERED HOMELESSNESS: Z59.01

## 2025-02-20 NOTE — ED ADULT TRIAGE NOTE - ARRIVAL FROM
Detail Level: Detailed Add 64696 Cpt? (Important Note: In 2017 The Use Of 01011 Is Being Tracked By Cms To Determine Future Global Period Reimbursement For Global Periods): yes Wound Evaluated By: Yeimy Esquivel MD Additional Comments: Continue to clean with hydrogen peroxide and apply Mupirocin ointment daily. Home

## 2025-03-06 ENCOUNTER — INPATIENT (INPATIENT)
Facility: HOSPITAL | Age: 35
LOS: 6 days | Discharge: HOME | DRG: 948 | End: 2025-03-13
Attending: FAMILY MEDICINE | Admitting: STUDENT IN AN ORGANIZED HEALTH CARE EDUCATION/TRAINING PROGRAM
Payer: COMMERCIAL

## 2025-03-06 VITALS
TEMPERATURE: 98 F | DIASTOLIC BLOOD PRESSURE: 96 MMHG | HEART RATE: 88 BPM | HEIGHT: 72 IN | SYSTOLIC BLOOD PRESSURE: 147 MMHG | WEIGHT: 179.9 LBS | RESPIRATION RATE: 20 BRPM | OXYGEN SATURATION: 98 %

## 2025-03-06 DIAGNOSIS — Z98.890 OTHER SPECIFIED POSTPROCEDURAL STATES: Chronic | ICD-10-CM

## 2025-03-06 LAB
ALBUMIN SERPL ELPH-MCNC: 3.7 G/DL — SIGNIFICANT CHANGE UP (ref 3.3–5.2)
ALP SERPL-CCNC: 63 U/L — SIGNIFICANT CHANGE UP (ref 40–120)
ALT FLD-CCNC: 15 U/L — SIGNIFICANT CHANGE UP
ANION GAP SERPL CALC-SCNC: 15 MMOL/L — SIGNIFICANT CHANGE UP (ref 5–17)
APPEARANCE UR: CLEAR — SIGNIFICANT CHANGE UP
APTT BLD: 31.3 SEC — SIGNIFICANT CHANGE UP (ref 24.5–35.6)
AST SERPL-CCNC: 28 U/L — SIGNIFICANT CHANGE UP
BASOPHILS # BLD AUTO: 0.02 K/UL — SIGNIFICANT CHANGE UP (ref 0–0.2)
BASOPHILS NFR BLD AUTO: 0.5 % — SIGNIFICANT CHANGE UP (ref 0–2)
BILIRUB SERPL-MCNC: 0.4 MG/DL — SIGNIFICANT CHANGE UP (ref 0.4–2)
BILIRUB UR-MCNC: ABNORMAL
BUN SERPL-MCNC: 8.4 MG/DL — SIGNIFICANT CHANGE UP (ref 8–20)
CALCIUM SERPL-MCNC: 8.7 MG/DL — SIGNIFICANT CHANGE UP (ref 8.4–10.5)
CHLORIDE SERPL-SCNC: 101 MMOL/L — SIGNIFICANT CHANGE UP (ref 96–108)
CK MB CFR SERPL CALC: 2 NG/ML — SIGNIFICANT CHANGE UP (ref 0–6.7)
CK SERPL-CCNC: 580 U/L — HIGH (ref 30–200)
CO2 SERPL-SCNC: 26 MMOL/L — SIGNIFICANT CHANGE UP (ref 22–29)
COLOR SPEC: SIGNIFICANT CHANGE UP
CREAT SERPL-MCNC: 0.81 MG/DL — SIGNIFICANT CHANGE UP (ref 0.5–1.3)
DIFF PNL FLD: NEGATIVE — SIGNIFICANT CHANGE UP
EGFR: 118 ML/MIN/1.73M2 — SIGNIFICANT CHANGE UP
EGFR: 118 ML/MIN/1.73M2 — SIGNIFICANT CHANGE UP
EOSINOPHIL # BLD AUTO: 0.17 K/UL — SIGNIFICANT CHANGE UP (ref 0–0.5)
EOSINOPHIL NFR BLD AUTO: 4.2 % — SIGNIFICANT CHANGE UP (ref 0–6)
FLUAV AG NPH QL: SIGNIFICANT CHANGE UP
FLUBV AG NPH QL: SIGNIFICANT CHANGE UP
GLUCOSE SERPL-MCNC: 90 MG/DL — SIGNIFICANT CHANGE UP (ref 70–99)
GLUCOSE UR QL: NEGATIVE MG/DL — SIGNIFICANT CHANGE UP
HCT VFR BLD CALC: 36.2 % — LOW (ref 39–50)
HGB BLD-MCNC: 11.8 G/DL — LOW (ref 13–17)
IMM GRANULOCYTES # BLD AUTO: 0.01 K/UL — SIGNIFICANT CHANGE UP (ref 0–0.07)
IMM GRANULOCYTES NFR BLD AUTO: 0.2 % — SIGNIFICANT CHANGE UP (ref 0–0.9)
INR BLD: 1.22 RATIO — HIGH (ref 0.85–1.16)
KETONES UR-MCNC: ABNORMAL MG/DL
LEUKOCYTE ESTERASE UR-ACNC: NEGATIVE — SIGNIFICANT CHANGE UP
LYMPHOCYTES # BLD AUTO: 2.12 K/UL — SIGNIFICANT CHANGE UP (ref 1–3.3)
LYMPHOCYTES NFR BLD AUTO: 52.1 % — HIGH (ref 13–44)
MCHC RBC-ENTMCNC: 29.4 PG — SIGNIFICANT CHANGE UP (ref 27–34)
MCHC RBC-ENTMCNC: 32.6 G/DL — SIGNIFICANT CHANGE UP (ref 32–36)
MCV RBC AUTO: 90.3 FL — SIGNIFICANT CHANGE UP (ref 80–100)
MONOCYTES # BLD AUTO: 0.55 K/UL — SIGNIFICANT CHANGE UP (ref 0–0.9)
MONOCYTES NFR BLD AUTO: 13.5 % — SIGNIFICANT CHANGE UP (ref 2–14)
NEUTROPHILS # BLD AUTO: 1.2 K/UL — LOW (ref 1.8–7.4)
NEUTROPHILS NFR BLD AUTO: 29.5 % — LOW (ref 43–77)
NITRITE UR-MCNC: NEGATIVE — SIGNIFICANT CHANGE UP
NRBC # BLD AUTO: 0 K/UL — SIGNIFICANT CHANGE UP (ref 0–0)
NRBC # FLD: 0 K/UL — SIGNIFICANT CHANGE UP (ref 0–0)
NRBC BLD AUTO-RTO: 0 /100 WBCS — SIGNIFICANT CHANGE UP (ref 0–0)
PH UR: 5.5 — SIGNIFICANT CHANGE UP (ref 5–8)
PLATELET # BLD AUTO: 224 K/UL — SIGNIFICANT CHANGE UP (ref 150–400)
PMV BLD: 9 FL — SIGNIFICANT CHANGE UP (ref 7–13)
POTASSIUM SERPL-MCNC: 3.3 MMOL/L — LOW (ref 3.5–5.3)
POTASSIUM SERPL-SCNC: 3.3 MMOL/L — LOW (ref 3.5–5.3)
PROT SERPL-MCNC: 6.7 G/DL — SIGNIFICANT CHANGE UP (ref 6.6–8.7)
PROT UR-MCNC: SIGNIFICANT CHANGE UP MG/DL
PROTHROM AB SERPL-ACNC: 13.8 SEC — HIGH (ref 9.9–13.4)
RBC # BLD: 4.01 M/UL — LOW (ref 4.2–5.8)
RBC # FLD: 13.1 % — SIGNIFICANT CHANGE UP (ref 10.3–14.5)
RSV RNA NPH QL NAA+NON-PROBE: SIGNIFICANT CHANGE UP
SARS-COV-2 RNA SPEC QL NAA+PROBE: SIGNIFICANT CHANGE UP
SODIUM SERPL-SCNC: 141 MMOL/L — SIGNIFICANT CHANGE UP (ref 135–145)
SP GR SPEC: >1.03 — HIGH (ref 1–1.03)
UROBILINOGEN FLD QL: 1 MG/DL — SIGNIFICANT CHANGE UP (ref 0.2–1)
WBC # BLD: 4.07 K/UL — SIGNIFICANT CHANGE UP (ref 3.8–10.5)
WBC # FLD AUTO: 4.07 K/UL — SIGNIFICANT CHANGE UP (ref 3.8–10.5)

## 2025-03-06 PROCEDURE — 72128 CT CHEST SPINE W/O DYE: CPT | Mod: 26

## 2025-03-06 PROCEDURE — 72125 CT NECK SPINE W/O DYE: CPT | Mod: 26

## 2025-03-06 PROCEDURE — 73600 X-RAY EXAM OF ANKLE: CPT | Mod: 26,LT

## 2025-03-06 PROCEDURE — 70450 CT HEAD/BRAIN W/O DYE: CPT | Mod: 26

## 2025-03-06 PROCEDURE — 99223 1ST HOSP IP/OBS HIGH 75: CPT

## 2025-03-06 PROCEDURE — 72131 CT LUMBAR SPINE W/O DYE: CPT | Mod: 26

## 2025-03-06 RX ORDER — BACLOFEN 10 MG/20ML
10 INJECTION INTRATHECAL EVERY 12 HOURS
Refills: 0 | Status: DISCONTINUED | OUTPATIENT
Start: 2025-03-06 | End: 2025-03-13

## 2025-03-06 RX ORDER — ACETAMINOPHEN 500 MG/5ML
650 LIQUID (ML) ORAL EVERY 6 HOURS
Refills: 0 | Status: DISCONTINUED | OUTPATIENT
Start: 2025-03-06 | End: 2025-03-13

## 2025-03-06 RX ORDER — QUETIAPINE FUMARATE 25 MG/1
150 TABLET ORAL AT BEDTIME
Refills: 0 | Status: DISCONTINUED | OUTPATIENT
Start: 2025-03-06 | End: 2025-03-13

## 2025-03-06 RX ORDER — BICTEGRAVIR SODIUM, EMTRICITABINE, AND TENOFOVIR ALAFENAMIDE FUMARATE 30; 120; 15 MG/1; MG/1; MG/1
1 TABLET ORAL DAILY
Refills: 0 | Status: DISCONTINUED | OUTPATIENT
Start: 2025-03-06 | End: 2025-03-13

## 2025-03-06 RX ORDER — METHOCARBAMOL 500 MG/1
750 TABLET, FILM COATED ORAL THREE TIMES A DAY
Refills: 0 | Status: DISCONTINUED | OUTPATIENT
Start: 2025-03-06 | End: 2025-03-08

## 2025-03-06 RX ORDER — PREGABALIN 50 MG/1
50 CAPSULE ORAL EVERY 8 HOURS
Refills: 0 | Status: COMPLETED | OUTPATIENT
Start: 2025-03-06 | End: 2025-03-13

## 2025-03-06 RX ADMIN — QUETIAPINE FUMARATE 150 MILLIGRAM(S): 25 TABLET ORAL at 22:30

## 2025-03-06 RX ADMIN — PREGABALIN 50 MILLIGRAM(S): 50 CAPSULE ORAL at 22:30

## 2025-03-06 NOTE — ED PROVIDER NOTE - CLINICAL SUMMARY MEDICAL DECISION MAKING FREE TEXT BOX
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  Patient has had multiple recent LPs in the last year and MRIs of his head and lumbar spine.  Patient is able to move his toes but is unable to lift his lower extremities against gravity, some motor function intact which would go against Guillain-Barré, no illness reported over the last few weeks that would serve as an inciting factor.  Will check CTs for any acute findings, dispo pending results and reevaluation.

## 2025-03-06 NOTE — CHART NOTE - NSCHARTNOTEFT_GEN_A_CORE
SWNote: pt seen by PT recc BRAY[facility] . CONCHITA requested , SW will circulate to multiple facilities . Will need auth ([facility]) . SW to follow.

## 2025-03-06 NOTE — ED CDU PROVIDER INITIAL DAY NOTE - PHYSICAL EXAMINATION
Gen: Well appearing in NAD  Head: NC/AT  Neck: trachea midline  Resp:  No distress  Ext: no deformities  Neuro:  A&O , gross sensation to lower extremities intact. poor effort with plantar and dorsiflexion as well as straight leg raise and flexion of knees but is moving lower extremities against gravity.   Skin:  Warm and dry as visualized  Psych:  Normal affect and mood

## 2025-03-06 NOTE — ED CDU PROVIDER INITIAL DAY NOTE - ATTENDING APP SHARED VISIT CONTRIBUTION OF CARE
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 pt with movement to LE although limited, no recent falls or illness. CT negative for acute findings. pt assessed and pending [facility] at this time

## 2025-03-06 NOTE — ED CDU PROVIDER INITIAL DAY NOTE - PROGRESS NOTE DETAILS
Signed out from Day PA. 25 yo male hx of congenital HIV, GBS with chronic lower extremity weakness, cocaine abuse in past (last used 2 days ago), chronic pain, presented to the ED with LE weakness and requesting social work placement. He was recently admitted in [facility], treated for guillain barre in Jan 2025. ED record reviewed > CK elevated. CT revealing no acute fracture, mid-moderate C6-7, incidental with mild prominence of adenoidal tissue, unchanged orbital fracture, parotid gland calcifications, mild emphysema. PT recommending [facility]. On obs pending SW/[facility] placement

## 2025-03-06 NOTE — ED CDU PROVIDER INITIAL DAY NOTE - NSICDXPASTMEDICALHX_GEN_ALL_CORE_FT
PAST MEDICAL HISTORY:  Asthma     Chronic sinusitis     Closed fracture of multiple ribs of right side, initial encounter     Cocaine abuse     GBS (Guillain Carleton syndrome)     HIV (human immunodeficiency virus infection) from birth    HIV disease     Homeless

## 2025-03-06 NOTE — PHYSICAL THERAPY INITIAL EVALUATION ADULT - MANUAL MUSCLE TESTING RESULTS, REHAB EVAL
B LE grossly 3-/5, able to SLR with significant effort, unable to attain full knee extension in weight bearing. B UE 5/5.

## 2025-03-06 NOTE — ED PROVIDER NOTE - PROVIDER TOKENS
FREE:[LAST:[[facility]],PHONE:[(   )    -],FAX:[(   )    -]],PROVIDER:[TOKEN:[[id number]],FOLLOWUP:[1-3 Days]] Fall with Harm Risk

## 2025-03-06 NOTE — ED PROVIDER NOTE - NSICDXPASTMEDICALHX_GEN_ALL_CORE_FT
PAST MEDICAL HISTORY:  Asthma     Chronic sinusitis     Closed fracture of multiple ribs of right side, initial encounter     Cocaine abuse     GBS (Guillain [place] syndrome)     HIV (human immunodeficiency virus infection) from birth    HIV disease     Homeless

## 2025-03-06 NOTE — PHYSICAL THERAPY INITIAL EVALUATION ADULT - PERTINENT HX OF CURRENT PROBLEM, REHAB EVAL
34-year-old male with past medical history of congenital HIV, asthma, Guillain-Barré with chronic lower extremity weakness presenting to the emergency room after a fall this morning secondary to his legs "giving out", complaining of low back pain and lower extremity weakness.  Patient is requesting subacute rehab as he states he is having a hard time caring for himself secondary to chronic weakness. Denies fevers/chills, CP, sob, nausea/vomiting, sensory deficits, or upper extremity weakness.

## 2025-03-06 NOTE — PHYSICAL THERAPY INITIAL EVALUATION ADULT - ADDITIONAL COMMENTS
pt states as of 2 days ago he was ambulatory with [facility]. lives with GF, 1 STACEY, pt has rollator at home as well. Chart review of previous admissions reports shelter placement so unsure if history is accurate.

## 2025-03-06 NOTE — ED PROVIDER NOTE - PHYSICAL EXAMINATION
General: well appearing, NAD  Head: NC, AT  EENT: EOMI, no scleral icterus  Cardiac: RRR, no apparent murmurs, no lower extremity edema  Respiratory: CTABL, no respiratory distress   Abdomen: soft, ND, NT, nonperitonitic  MSK/Vascular: soft compartments, warm extremities, L distal tibia TTP w/pain on passive dorsiflexion, midline lumbar TTP  Neuro: AAOx3, 2/5 strength b/l LE, sensation to light touch intact  Psych: calm, cooperative

## 2025-03-06 NOTE — PHYSICAL THERAPY INITIAL EVALUATION ADULT - IMPAIRMENTS CONTRIBUTING IMPAIRED BED MOBILITY, REHAB EVAL
[facility]    Progress Note    Gisell Quinn Patient Status:  Inpatient    1982 MRN [id number]   Location [facility] 2W/SW Attending Camryn Guerra MD   [facility] Day # 16 PCP Carlie Vera.  DO Rogelio        Subjective:   Nata Patiño up CT PE/protocol imaging on Monday to reassess her PE along with CT abd/pelvis so that we can have her imaging available for review by GI tumor board at noon    --port a cath placement, will be deferred until the infection from the abscess can be cleared (H) 01/02/2019    ALT 28 01/02/2019    PTT 72.7 (H) 01/04/2019    INR 1.1 12/27/2018    T4F 1.26 07/26/2016    TSH 0.65 12/28/2016    LIP 22 12/19/2018    DDIMER >4.00 (H) 12/19/2018    MG 1.7 (L) 01/04/2019    CK 33 (L) 12/19/2018       Xr Chest Ap/pa (1 decreased strength

## 2025-03-06 NOTE — ED ADULT TRIAGE NOTE - CHIEF COMPLAINT QUOTE
hx guilliane barre presents to ed with increased weakness to lower extremities. with episode of urinary incontinence.

## 2025-03-06 NOTE — ED PROVIDER NOTE - ATTENDING CONTRIBUTION TO CARE
Pt is a 35 yo M here for weakness.  PMHx significant for GBS resulting in chronic leg weakness, HIV, drug abuse.  Pt states that he felt more weak today and was having trouble walking causing him to fall so he came to the ER. no other complaints.    physical - rrr. ctab. abd - soft, nt. no edema. no rash. neuro - strength 4/5 B/L LE sensation intact x4. no signs of trauma to legs, torso or head.    plan - labs and imaging reviewed. PT evaluated and recommended [facility].  Pt has had many admissions for [facility] placement previously for same complaints and frequently elopes or leaves after stay to go home or stay with family. case d/w [facility]NA green and Dr. Cabezas

## 2025-03-06 NOTE — ED ADULT NURSE NOTE - HIV MEDS
No
Detail Level: Zone
Initiate Treatment: Wash with gentle cleanser \\nApply hydrocortisone twice a day for 4 weeks \\nApply moisturizer to entire face

## 2025-03-06 NOTE — ED PROVIDER NOTE - OBJECTIVE STATEMENT
22-Jan-2021 05-Feb-2021 22-Jan-2021 05-Feb-2021 28-Jan-2021 22-Jan-2021 12-Feb-2021 05-Feb-2021 22-Jan-2021 34-year-old male with past medical history of congenital HIV, asthma, Guillain-Barré with chronic lower extremity weakness presenting to the emergency room after a fall this morning secondary to his legs "giving out", complaining of low back pain and lower extremity weakness.  Patient is requesting subacute rehab as he states he is having a hard time caring for himself secondary to chronic weakness. Denies fevers/chills, CP, sob, nausea/vomiting, sensory deficits, or upper extremity weakness. 05-Feb-2021 22-Jan-2021 05-Feb-2021 28-Jan-2021 22-Jan-2021 28-Jan-2021 28-Jan-2021 05-Feb-2021 05-Feb-2021 12-Feb-2021 29-Jan-2021

## 2025-03-06 NOTE — ED CDU PROVIDER INITIAL DAY NOTE - CLINICAL SUMMARY MEDICAL DECISION MAKING FREE TEXT BOX
33 yo male hx of congenital HIV GBS with chronic lower extremity weakness, polysubstance abuse last used cocain 2 days ago, presented to ED for BRAYDEN placvement due to difficulty caring for self at home as well as chronic weakness. pt with movement to LE although limited, no recent falls or illness. CT negative for acute findings. pt assessed and pending BRAYDEN at this time

## 2025-03-06 NOTE — ED CDU PROVIDER INITIAL DAY NOTE - OBJECTIVE STATEMENT
23 yo male hx of congenital HIV, GBS with chronic lower extremity weakness, cocaine abuse in past (last used 2 days ago), chronic pain, presented to the ED with LE weakness, and had requested BRAYDEN placement. reports that he has been living with his girlfriends apartment and utilizing rollator. denies accidents or injuries, fever chills nausea vomiting, difficulty breathing, issues with bowel and bladder function. recently admit HH for worsening weakness and was circulated through BRAYDEN facilities at that time and declined from all at that time. as per pt has not followed with outpt neurology 35 yo male hx of congenital HIV, GBS with chronic lower extremity weakness, cocaine abuse in past (last used 2 days ago), chronic pain, presented to the ED with LE weakness, and had requested BRAYDEN placement. reports that he has been living with his girlfriends apartment and utilizing rollator. denies accidents or injuries, fever chills nausea vomiting, difficulty breathing, issues with bowel and bladder function. recently admit HH for worsening weakness and was circulated through BRAYDEN facilities at that time and declined from all at that time. as per pt has not followed with outpt neurology

## 2025-03-06 NOTE — ED ADULT NURSE REASSESSMENT NOTE - NS ED NURSE REASSESS COMMENT FT1
Assuming care from ANDREA Allison, plan of care, reason for hospital visit were reviewed, introduced to patient, updated patient on plan of care. Education deemed successful after teach back shows proficiency, VS recorded in the EMR. Pt refusing IV access / IV fluids, ANA Pacheco notified and at bedside talking to pt.

## 2025-03-06 NOTE — PHYSICAL THERAPY INITIAL EVALUATION ADULT - IMPAIRMENTS CONTRIBUTING TO GAIT DEVIATIONS, PT EVAL
pt with heavy UE use on RW. pts presentation of LE weakness appears manufactured, unclear of authenticity of symptoms based on presentation./decreased strength

## 2025-03-07 LAB
CULTURE RESULTS: NO GROWTH — SIGNIFICANT CHANGE UP
HIV-1 VIRAL LOAD RESULT: ABNORMAL
HIV1 RNA # SERPL NAA+PROBE: 971 — SIGNIFICANT CHANGE UP
HIV1 RNA SER-IMP: SIGNIFICANT CHANGE UP
HIV1 RNA SERPL NAA+PROBE-ACNC: ABNORMAL
HIV1 RNA SERPL NAA+PROBE-LOG#: 2.99 — SIGNIFICANT CHANGE UP
SPECIMEN SOURCE: SIGNIFICANT CHANGE UP

## 2025-03-07 PROCEDURE — 99232 SBSQ HOSP IP/OBS MODERATE 35: CPT

## 2025-03-07 RX ADMIN — PREGABALIN 50 MILLIGRAM(S): 50 CAPSULE ORAL at 21:20

## 2025-03-07 RX ADMIN — METHOCARBAMOL 750 MILLIGRAM(S): 500 TABLET, FILM COATED ORAL at 15:34

## 2025-03-07 RX ADMIN — PREGABALIN 50 MILLIGRAM(S): 50 CAPSULE ORAL at 05:03

## 2025-03-07 RX ADMIN — BACLOFEN 10 MILLIGRAM(S): 10 INJECTION INTRATHECAL at 19:06

## 2025-03-07 RX ADMIN — QUETIAPINE FUMARATE 150 MILLIGRAM(S): 25 TABLET ORAL at 21:20

## 2025-03-07 RX ADMIN — METHOCARBAMOL 750 MILLIGRAM(S): 500 TABLET, FILM COATED ORAL at 21:20

## 2025-03-07 RX ADMIN — Medication 650 MILLIGRAM(S): at 15:34

## 2025-03-07 RX ADMIN — BACLOFEN 10 MILLIGRAM(S): 10 INJECTION INTRATHECAL at 05:03

## 2025-03-07 RX ADMIN — PREGABALIN 50 MILLIGRAM(S): 50 CAPSULE ORAL at 13:09

## 2025-03-07 RX ADMIN — BICTEGRAVIR SODIUM, EMTRICITABINE, AND TENOFOVIR ALAFENAMIDE FUMARATE 1 TABLET(S): 30; 120; 15 TABLET ORAL at 13:09

## 2025-03-07 NOTE — ED ADULT NURSE REASSESSMENT NOTE - NS ED NURSE REASSESS COMMENT FT1
assumed care of pt from night shift RN E.G. pt a&ox4, resting in stretcher. c.o of back spasm on awaking. offers no other complaints, aware of POC awaiting BRAYDEN placement, awaiting insurance auth. all questions and concerns addressed. safety maintenance

## 2025-03-07 NOTE — ED ADULT NURSE REASSESSMENT NOTE - NS ED NURSE REASSESS COMMENT FT1
Pt is AOx3 NAD noted, pt resting comfortably in bed, side rails up and wheels locked. Pt breathing even and unlabored, pt denies any CP, SOB or HA. Pt is awaiting SW placement POC explained, pt verbalized understanding and has no acute complaints at this time.

## 2025-03-07 NOTE — CHART NOTE - NSCHARTNOTEFT_GEN_A_CORE
ROSALINO Note: Tried to meet with pt to gather additional info about living situation and plan for BRAYDEN. Unable to arouse and interview. Reviewed chart. CONCHITA completed and circulated to [facility] but all declined at this time. Discussed case with senior ROSALINO Alfaro. She plans to help circulate CONCHITA to further [facility]. SW will follow

## 2025-03-08 LAB
AMPHET UR-MCNC: NEGATIVE — SIGNIFICANT CHANGE UP
BARBITURATES UR SCN-MCNC: NEGATIVE — SIGNIFICANT CHANGE UP
BENZODIAZ UR-MCNC: NEGATIVE — SIGNIFICANT CHANGE UP
COCAINE METAB.OTHER UR-MCNC: POSITIVE
FENTANYL UR QL SCN: NEGATIVE — SIGNIFICANT CHANGE UP
FLUAV AG NPH QL: SIGNIFICANT CHANGE UP
FLUBV AG NPH QL: SIGNIFICANT CHANGE UP
METHADONE UR-MCNC: NEGATIVE — SIGNIFICANT CHANGE UP
OPIATES UR-MCNC: NEGATIVE — SIGNIFICANT CHANGE UP
PCP SPEC-MCNC: SIGNIFICANT CHANGE UP
PCP UR-MCNC: NEGATIVE — SIGNIFICANT CHANGE UP
RSV RNA NPH QL NAA+NON-PROBE: SIGNIFICANT CHANGE UP
SARS-COV-2 RNA SPEC QL NAA+PROBE: SIGNIFICANT CHANGE UP
THC UR QL: POSITIVE

## 2025-03-08 PROCEDURE — 99232 SBSQ HOSP IP/OBS MODERATE 35: CPT

## 2025-03-08 PROCEDURE — 71046 X-RAY EXAM CHEST 2 VIEWS: CPT | Mod: 26

## 2025-03-08 RX ORDER — ENOXAPARIN SODIUM 100 MG/ML
40 INJECTION SUBCUTANEOUS EVERY 24 HOURS
Refills: 0 | Status: DISCONTINUED | OUTPATIENT
Start: 2025-03-08 | End: 2025-03-13

## 2025-03-08 RX ADMIN — PREGABALIN 50 MILLIGRAM(S): 50 CAPSULE ORAL at 05:56

## 2025-03-08 RX ADMIN — PREGABALIN 50 MILLIGRAM(S): 50 CAPSULE ORAL at 14:34

## 2025-03-08 RX ADMIN — QUETIAPINE FUMARATE 150 MILLIGRAM(S): 25 TABLET ORAL at 21:47

## 2025-03-08 RX ADMIN — Medication 20 MILLIEQUIVALENT(S): at 18:37

## 2025-03-08 RX ADMIN — BACLOFEN 10 MILLIGRAM(S): 10 INJECTION INTRATHECAL at 18:37

## 2025-03-08 RX ADMIN — ENOXAPARIN SODIUM 40 MILLIGRAM(S): 100 INJECTION SUBCUTANEOUS at 21:48

## 2025-03-08 RX ADMIN — PREGABALIN 50 MILLIGRAM(S): 50 CAPSULE ORAL at 21:47

## 2025-03-08 RX ADMIN — BICTEGRAVIR SODIUM, EMTRICITABINE, AND TENOFOVIR ALAFENAMIDE FUMARATE 1 TABLET(S): 30; 120; 15 TABLET ORAL at 14:33

## 2025-03-08 RX ADMIN — BACLOFEN 10 MILLIGRAM(S): 10 INJECTION INTRATHECAL at 05:56

## 2025-03-08 NOTE — ED ADULT NURSE REASSESSMENT NOTE - NS ED NURSE REASSESS COMMENT FT1
Assumed care of patient at 730, alert and oriented x4, resting in bed, No distress. Meal provided. Awaiting social work for placement. NO iv in place all providers aware and okay with It. Denies any pain.

## 2025-03-08 NOTE — ED ADULT NURSE REASSESSMENT NOTE - NS ED NURSE REASSESS COMMENT FT1
Assumed care of patient at 1930, resting on stretcher in no acute distress.  Pt remains A&Ox4, denies sob/chest pain with vss as charted, no complaints voiced at this time.  Pt is awaiting PT eval in AM and possible BRAYDEN placement.

## 2025-03-08 NOTE — ED ADULT NURSE REASSESSMENT NOTE - NS ED NURSE REASSESS COMMENT FT1
Patient remains A&Ox4, resting on stretcher in no acute distress with VSS as charted, no complaints voiced, pt currently awaiting PT eval in AM.

## 2025-03-09 DIAGNOSIS — R53.1 WEAKNESS: ICD-10-CM

## 2025-03-09 LAB
ANION GAP SERPL CALC-SCNC: 8 MMOL/L — SIGNIFICANT CHANGE UP (ref 5–17)
APTT BLD: 23.1 SEC — LOW (ref 24.5–35.6)
BASOPHILS # BLD AUTO: 0.01 K/UL — SIGNIFICANT CHANGE UP (ref 0–0.2)
BASOPHILS NFR BLD AUTO: 0.2 % — SIGNIFICANT CHANGE UP (ref 0–2)
BUN SERPL-MCNC: 4.8 MG/DL — LOW (ref 8–20)
CALCIUM SERPL-MCNC: 8.5 MG/DL — SIGNIFICANT CHANGE UP (ref 8.4–10.5)
CHLORIDE SERPL-SCNC: 99 MMOL/L — SIGNIFICANT CHANGE UP (ref 96–108)
CO2 SERPL-SCNC: 28 MMOL/L — SIGNIFICANT CHANGE UP (ref 22–29)
CREAT SERPL-MCNC: 0.96 MG/DL — SIGNIFICANT CHANGE UP (ref 0.5–1.3)
EGFR: 106 ML/MIN/1.73M2 — SIGNIFICANT CHANGE UP
EGFR: 106 ML/MIN/1.73M2 — SIGNIFICANT CHANGE UP
EOSINOPHIL # BLD AUTO: 0.17 K/UL — SIGNIFICANT CHANGE UP (ref 0–0.5)
EOSINOPHIL NFR BLD AUTO: 3.7 % — SIGNIFICANT CHANGE UP (ref 0–6)
GLUCOSE SERPL-MCNC: 77 MG/DL — SIGNIFICANT CHANGE UP (ref 70–99)
HCT VFR BLD CALC: 40.3 % — SIGNIFICANT CHANGE UP (ref 39–50)
HGB BLD-MCNC: 12.8 G/DL — LOW (ref 13–17)
IMM GRANULOCYTES # BLD AUTO: 0.01 K/UL — SIGNIFICANT CHANGE UP (ref 0–0.07)
IMM GRANULOCYTES NFR BLD AUTO: 0.2 % — SIGNIFICANT CHANGE UP (ref 0–0.9)
INR BLD: 1.06 RATIO — SIGNIFICANT CHANGE UP (ref 0.85–1.16)
LYMPHOCYTES # BLD AUTO: 1.65 K/UL — SIGNIFICANT CHANGE UP (ref 1–3.3)
LYMPHOCYTES NFR BLD AUTO: 35.8 % — SIGNIFICANT CHANGE UP (ref 13–44)
MCHC RBC-ENTMCNC: 29.4 PG — SIGNIFICANT CHANGE UP (ref 27–34)
MCHC RBC-ENTMCNC: 31.8 G/DL — LOW (ref 32–36)
MCV RBC AUTO: 92.6 FL — SIGNIFICANT CHANGE UP (ref 80–100)
MONOCYTES # BLD AUTO: 0.56 K/UL — SIGNIFICANT CHANGE UP (ref 0–0.9)
MONOCYTES NFR BLD AUTO: 12.1 % — SIGNIFICANT CHANGE UP (ref 2–14)
NEUTROPHILS # BLD AUTO: 2.21 K/UL — SIGNIFICANT CHANGE UP (ref 1.8–7.4)
NEUTROPHILS NFR BLD AUTO: 48 % — SIGNIFICANT CHANGE UP (ref 43–77)
NRBC # BLD AUTO: 0 K/UL — SIGNIFICANT CHANGE UP (ref 0–0)
NRBC # FLD: 0 K/UL — SIGNIFICANT CHANGE UP (ref 0–0)
NRBC BLD AUTO-RTO: 0 /100 WBCS — SIGNIFICANT CHANGE UP (ref 0–0)
PLATELET # BLD AUTO: 208 K/UL — SIGNIFICANT CHANGE UP (ref 150–400)
PMV BLD: 9.3 FL — SIGNIFICANT CHANGE UP (ref 7–13)
POTASSIUM SERPL-MCNC: 4.2 MMOL/L — SIGNIFICANT CHANGE UP (ref 3.5–5.3)
POTASSIUM SERPL-SCNC: 4.2 MMOL/L — SIGNIFICANT CHANGE UP (ref 3.5–5.3)
PROTHROM AB SERPL-ACNC: 12.3 SEC — SIGNIFICANT CHANGE UP (ref 9.9–13.4)
RBC # BLD: 4.35 M/UL — SIGNIFICANT CHANGE UP (ref 4.2–5.8)
RBC # FLD: 12.7 % — SIGNIFICANT CHANGE UP (ref 10.3–14.5)
SODIUM SERPL-SCNC: 135 MMOL/L — SIGNIFICANT CHANGE UP (ref 135–145)
WBC # BLD: 4.61 K/UL — SIGNIFICANT CHANGE UP (ref 3.8–10.5)
WBC # FLD AUTO: 4.61 K/UL — SIGNIFICANT CHANGE UP (ref 3.8–10.5)

## 2025-03-09 PROCEDURE — 93010 ELECTROCARDIOGRAM REPORT: CPT

## 2025-03-09 PROCEDURE — 99233 SBSQ HOSP IP/OBS HIGH 50: CPT

## 2025-03-09 PROCEDURE — 71250 CT THORAX DX C-: CPT | Mod: 26

## 2025-03-09 PROCEDURE — 99223 1ST HOSP IP/OBS HIGH 75: CPT

## 2025-03-09 RX ORDER — DEXTROMETHORPHAN HBR, GUAIFENESIN 200 MG/10ML
200 LIQUID ORAL EVERY 6 HOURS
Refills: 0 | Status: DISCONTINUED | OUTPATIENT
Start: 2025-03-09 | End: 2025-03-13

## 2025-03-09 RX ORDER — AZITHROMYCIN 250 MG
500 CAPSULE ORAL ONCE
Refills: 0 | Status: COMPLETED | OUTPATIENT
Start: 2025-03-09 | End: 2025-03-09

## 2025-03-09 RX ADMIN — BICTEGRAVIR SODIUM, EMTRICITABINE, AND TENOFOVIR ALAFENAMIDE FUMARATE 1 TABLET(S): 30; 120; 15 TABLET ORAL at 14:57

## 2025-03-09 RX ADMIN — BACLOFEN 10 MILLIGRAM(S): 10 INJECTION INTRATHECAL at 18:04

## 2025-03-09 RX ADMIN — Medication 650 MILLIGRAM(S): at 18:06

## 2025-03-09 RX ADMIN — Medication 250 MILLIGRAM(S): at 18:03

## 2025-03-09 RX ADMIN — BACLOFEN 10 MILLIGRAM(S): 10 INJECTION INTRATHECAL at 05:31

## 2025-03-09 RX ADMIN — PREGABALIN 50 MILLIGRAM(S): 50 CAPSULE ORAL at 14:57

## 2025-03-09 RX ADMIN — ENOXAPARIN SODIUM 40 MILLIGRAM(S): 100 INJECTION SUBCUTANEOUS at 21:30

## 2025-03-09 RX ADMIN — QUETIAPINE FUMARATE 150 MILLIGRAM(S): 25 TABLET ORAL at 21:30

## 2025-03-09 RX ADMIN — PREGABALIN 50 MILLIGRAM(S): 50 CAPSULE ORAL at 21:30

## 2025-03-09 RX ADMIN — PREGABALIN 50 MILLIGRAM(S): 50 CAPSULE ORAL at 05:31

## 2025-03-09 NOTE — PROVIDER CONTACT NOTE (OTHER) - BACKGROUND
Statement: 34-year-old male with past medical history of congenital HIV, asthma, Guillain-Barré with chronic lower extremity weakness

## 2025-03-09 NOTE — H&P ADULT - HISTORY OF PRESENT ILLNESS
34-year-old male with past medical history of congenital HIV, asthma, Guillain-Barré with chronic lower extremity weakness presenting to the emergency room after a fall  secondary to his legs "giving out", Complaining of low back pain and lower extremity weakness.  Patient is requesting subacute rehab as he states he is having a hard time caring for himself secondary to chronic weakness. Denies fevers/chills, CP, sob, nausea/vomiting, sensory deficits, or upper extremity weakness. Was admitted to observation unit and was seen by PT who recommended BRAYDEN.      34-year-old male with past medical history of congenital HIV, asthma, Guillain-Barré with chronic lower extremity weakness presenting to the emergency room after a fall  secondary to his legs "giving out",  Complaining of low back pain and lower extremity weakness. Requesting subacute rehab as he states he is having a hard time caring for himself secondary to chronic weakness. Denies fevers/chills, CP, sob, nausea/vomiting, sensory deficits, or upper extremity weakness. Was admitted to observation unit and was seen by PT who recommended BRAYDEN.

## 2025-03-09 NOTE — ED ADULT NURSE REASSESSMENT NOTE - NS ED NURSE REASSESS COMMENT FT1
Patient A&Ox4 resting in bed, NAD noted, awaiting CT scan results, safety measures in place, awaiting BRAYDEN placement.

## 2025-03-09 NOTE — ED CDU PROVIDER DISPOSITION NOTE - CLINICAL COURSE
35 y/o male with PMHX of HIV since birth, asthma, drug abuse, hx of GBS in the past with chronic weakness, hx of CVA and PAF as per patient s/p weakness in legs and chronic pain- lower extremity weakness, cocaine abuse in past (last used 2 days ago), pt had requested BRAYDEN placement. reports that he has been living with his girlfriends apartment and utilizing rollator.Patient kept on observation for physical therapy evaluation, pain control and BRAYDEN placement.3/6/25 patient had physical therapy  recommended subacute rehab. c/m and S.w unable assign the placement yet . cxr ? PNA that CT of the chest confirm with bronchiolitis will tx the pt with ABx . will admit the pt for placement as well

## 2025-03-09 NOTE — ED ADULT NURSE REASSESSMENT NOTE - NS ED NURSE REASSESS COMMENT FT1
patient A&Ox4 resting in bed , NAD noted, vitals stable, awaiting PT evaluation, safety measures in place.

## 2025-03-09 NOTE — ED CDU PROVIDER SUBSEQUENT DAY NOTE - PROGRESS NOTE DETAILS
Signed out from Day PA. 23 yo male hx of congenital HIV, GBS with chronic lower extremity weakness, cocaine abuse in past (last used 2 days ago), chronic pain, presented to the ED with LE weakness and requesting social work placement. He was recently admitted in [facility], treated for guillain barre in Jan 2025. ED record reviewed > CK elevated. CT revealing no acute fracture, mid-moderate C6-7, incidental with mild prominence of adenoidal tissue, unchanged orbital fracture, parotid gland calcifications, mild emphysema. PT recommending BRAYDEN. On obs pending SW/BRAYDEN placement
.spoke with Rickie Mohamud today who requesting another eval by PT.  called physical therapy requested for the  reevaluation. and seen the pt possibly today sooner . gita pt is on obs for near 75 hrs

## 2025-03-09 NOTE — PROVIDER CONTACT NOTE (OTHER) - ASSESSMENT
PT eval orders received. Chart reviewed, contents noted. Please see note for full details. Pt tolerated evaluation well, willing to participate but weakness limiting functional mobility. Pt reports pain 3/10 pre and post session located at low back chronic in nature. Pt declines need for pain intervention at this time. Pt left seated in bed after session with all lines intact, CBWR. MD made aware of pt status and participation in PT. PT will continue to follow.     d/c rec: BRAYDEN    pt verbally educated on safe mobility and d/c planning. Pt verbalizes understanding.
Pt performed bed mobility with supervision, utilized BUE to assist with moving BLE. Performed STS transfer with min-A. Pt with decreased active knee extension while standing, stated he could not straighten them all the way. Instructed pt to performed standing marches at bedside, +right knee buckle when lifting LLE. Pt performed 2x with +knee buckle with both attempts. Unsafe to ambulate at this time due to BLE weakness.

## 2025-03-09 NOTE — ED ADULT NURSE REASSESSMENT NOTE - NS ED NURSE REASSESS COMMENT FT1
Patient A&Ox4, no complaints voiced, resting on stretcher in no acute distress, awaiting pulmonology consult.  VSS as charted.

## 2025-03-09 NOTE — H&P ADULT - NSICDXPASTMEDICALHX_GEN_ALL_CORE_FT
PAST MEDICAL HISTORY:  Asthma     Chronic sinusitis     Closed fracture of multiple ribs of right side, initial encounter     Cocaine abuse     GBS (Guillain [place] syndrome)     HIV (human immunodeficiency virus infection) from birth    HIV disease     Homeless

## 2025-03-09 NOTE — ED ADULT NURSE REASSESSMENT NOTE - NS ED NURSE REASSESS COMMENT FT1
Assumed care from ANDREA Murray at 0720, patient A&Ox4 resting in bed , NAD noted, vitals stable, awaiting PT evaluation in the Am, safety measures in place. GIANLUCA call completed! Just an FYI!

## 2025-03-09 NOTE — ED CDU PROVIDER SUBSEQUENT DAY NOTE - NSICDXPASTMEDICALHX_GEN_ALL_CORE_FT
PAST MEDICAL HISTORY:  Asthma     Chronic sinusitis     Closed fracture of multiple ribs of right side, initial encounter     Cocaine abuse     GBS (Guillain [place] syndrome)     HIV (human immunodeficiency virus infection) from birth    HIV disease     Homeless     
PAST MEDICAL HISTORY:  Asthma     Chronic sinusitis     Closed fracture of multiple ribs of right side, initial encounter     Cocaine abuse     GBS (Guillain [place] syndrome)     HIV (human immunodeficiency virus infection) from birth    HIV disease     Homeless     
PAST MEDICAL HISTORY:  Asthma     Chronic sinusitis     Closed fracture of multiple ribs of right side, initial encounter     Cocaine abuse     GBS (Guillain [place] syndrome)     HIV (human immunodeficiency virus infection) from birth    HIV disease     Homeless     
PAST MEDICAL HISTORY:  Asthma     Chronic sinusitis     Closed fracture of multiple ribs of right side, initial encounter     Cocaine abuse     GBS (Guillain [place] syndrome)     HIV (human immunodeficiency virus infection) from birth    HIV disease     Homeless

## 2025-03-09 NOTE — ED CDU PROVIDER SUBSEQUENT DAY NOTE - ATTENDING APP SHARED VISIT CONTRIBUTION OF CARE
I agree with the PA's note and was available for any issues/concerns. I was directly involved in patient care. My brief overall assessment is as follows: no events, resting comfortably, awaiting CM for BRAYDEN.
34-year-old male history of congenital HIV, chronic lower ext weakness w. remote hx of GBS, recently retreated with IVIG treatment at , in obs for PT/BRAYDEN; s/p ED workup w. Dr Brock then placed in obs for poss BRAYDEN placement / weakness. Recent stay at  for same, unable to find placement at that time. On exam this AM patient reports no acute events overnight; SW following, will f/u recs.
I agree with the PA's note and was available for any issues/concerns. I was directly involved in patient care. My brief overall assessment is as follows: resting comfortably, no events overnight, awaiting CM for BRAYDEN.
Pt resting comfortably at time of re-assessment. No events overnight. Will continue to monitor. SW following for BRAYDEN

## 2025-03-09 NOTE — ED CDU PROVIDER SUBSEQUENT DAY NOTE - NSICDXPASTSURGICALHX_GEN_ALL_CORE_FT
PAST SURGICAL HISTORY:  History of orthopedic surgery left arm    
RD provided the patient with extensive verbal and written DM diet education; including, carb counting, label reading, meal planning, pre-prandial and post-prandial finger stick goals, and HbA1c goal. Patient was also made aware of the physiological implications of poor glycemic control. Also discussed Heart Healthy diet recommendations. Importance of having consistent carbohydrate with protein at each meals emphasized.

## 2025-03-09 NOTE — H&P ADULT - NSHPPHYSICALEXAM_GEN_ALL_CORE
General  Head and neck  Cardio  Pulm  GI  Neuro  Extr General: laying in bed, NAD   Head and neck: normocephalic, no JVD   Cardio: regular rate and rhythm, no murmur   Pulm: clear breath sounds, no wheezing   GI: abdomen is soft, BS (+)  Neuro: AOx3, no focal weakness  Extr: no edema

## 2025-03-09 NOTE — ED CDU PROVIDER SUBSEQUENT DAY NOTE - PHYSICAL EXAMINATION
Gen: Well appearing in NAD  Head: NC/AT  Neck: trachea midline  Resp:  No distress  Ext: no deformities  Neuro:  A&O , gross sensation to lower extremities intact. poor effort with plantar and dorsiflexion as well as straight leg raise and flexion of knees but is moving lower extremities against gravity.   Skin:  Warm and dry as visualized  Psych:  Normal affect and mood
Gen: Well appearing in NAD  Head: NC/AT  Neck: trachea midline  Resp:  No distress  Ext: no deformities  Neuro:  A&O , gross sensation to lower extremities intact. poor effort with plantar and dorsiflexion as well as straight leg raise and flexion of knees but is moving lower extremities against gravity.   Skin:  Warm and dry as visualized  Psych:  Normal affect and mood
Gen: No acute distress, non toxic male, speaking in full sentences   HEENT: NCAT, Mucous membranes moist  Eyes: pink conjunctivae, EOMI, PERRL  CV: RRR, nl s1/s2 noted   Resp: CTAB, normal rate and effort  GI: Abdomen soft, NT, ND. No rebound, no guarding  Neuro: A&O x 3, sensorimotor intact without deficits   MSK: No spine or joint tenderness to palpation, 4/5 strength, Full ROM ext x 4  Skin: No rashes. intact and perfused

## 2025-03-09 NOTE — ED CDU PROVIDER SUBSEQUENT DAY NOTE - NS ED ATTENDING STATEMENT MOD
This was a shared visit with the MADDY. I reviewed and verified the documentation.

## 2025-03-09 NOTE — ED CDU PROVIDER SUBSEQUENT DAY NOTE - CLINICAL SUMMARY MEDICAL DECISION MAKING FREE TEXT BOX
33 yo male hx of congenital HIV, GBS with chronic lower extremity weakness, cocaine abuse in past (last used 2 days ago), chronic pain, presented to the ED with LE weakness and requesting social work placement. PT recommending BRAYDEN. On obs pending SW/BRAYDEN placement.
35yo M pmh congenital HIV, GSB with chronic lower extremity weakness, asthma, cocaine use (last used 2-3d ago) p/w acute on chronic weakness resulting in several ground level falls. pt requesting BRAYDEN. pt with movement to LE although limited, no recent falls or illness. labs and UA wnl. CT head/neck/lumbar/thoracic and XR ankle negative for acute findings. HIV load detected. CXR neg for infectious process. RVP neg. pt was evaluated by PT who rec BRAYDEN. SW following pending BRAYDEN placement.
33yo M pmh congenital HIV, GSB with chronic lower extremity weakness, asthma, cocaine use (last used 2-3d ago) p/w acute on chronic weakness resulting in several ground level falls. pt requesting BRAYDEN. pt with movement to LE although limited, no recent falls or illness. labs and UA wnl. CT head/neck/lumbar/thoracic and XR ankle negative for acute findings. HIV load detected. CXR neg for infectious process. RVP neg. pt was evaluated by PT who rec BRAYDEN. SW following pending BRAYDEN placement.

## 2025-03-09 NOTE — ED CDU PROVIDER SUBSEQUENT DAY NOTE - NSICDXFAMILYHX_GEN_ALL_CORE_FT
FAMILY HISTORY:  Mother  Still living? Unknown  FH: HIV infection, Age at diagnosis: Age Unknown    

## 2025-03-09 NOTE — ED CDU PROVIDER SUBSEQUENT DAY NOTE - WET READ LAUNCH FT
There are no Wet Read(s) to document.
There are no Wet Read(s) to document.
There is 1 Wet Read(s) to document.
There are no Wet Read(s) to document.

## 2025-03-09 NOTE — ED ADULT NURSE REASSESSMENT NOTE - NS ED NURSE REASSESS COMMENT FT1
Patient A&Ox4 resting in bed, vitals as charted, patient continues to have left leg weakness and low back pain rated as 4/10 at this time, Tylenol given. Blood work sent, IV antibiotic started. patient awaiting admission for weakness and bronchiolitis, patient aware of POC.

## 2025-03-09 NOTE — ED CDU PROVIDER SUBSEQUENT DAY NOTE - HISTORY
No acute events overnight. Resting. VSS.
chronic LE weakness from GBS, homeless, pending PT reassessment   moving LE against gravity
Pt resting comfortably at time of re-assessment. No events overnight. Will continue to monitor. SW following for BRAYDEN

## 2025-03-09 NOTE — ED ADULT NURSE REASSESSMENT NOTE - NS ED NURSE REASSESS COMMENT FT1
Pt received at 1900, A&Ox4. No signs of acute distress noted. IV clean and intact. Pt is ANY bed and room air.. Pt in bed, call bell, and personal items w/in reach. Safety, and fall precautions maintained and hourly rounding completed. Pt received at 1900, A&Ox4. No signs of acute distress noted. IV clean and intact. Pt is ANY bed and room air. Pt in bed, call bell, and personal items w/in reach. Safety, and fall precautions maintained and hourly rounding completed.

## 2025-03-09 NOTE — H&P ADULT - ASSESSMENT
34-year-old male with past medical history of congenital HIV, asthma, Guillain-Barré with chronic lower extremity weakness presenting to the emergency room after a fall  secondary to his legs "giving out", Complaining of low back pain and lower extremity weakness.  Patient is requesting subacute rehab as he states he is having a hard time caring for himself secondary to chronic weakness. Denies fevers/chills, CP, sob, nausea/vomiting, sensory deficits, or upper extremity weakness. Was admitted to observation unit and was seen by PT who recommended BRAYDEN.     Fall   admit to medicine   Full workup done, including infectious workup done while in observation unit   No acute source of infection found   Has chronic HIV and on treatment   Seen by PTx2, recommended BRAYDEN   Was started on Azithromycin by ED provider for CT findings of Bronchiolitis,  Completed 3 days, will dc after todays dose   Repeat CBC, BMP, Mg in AM     Hx of HIV  c/w [id number]     Chronic pain   c/w Baclofen, pregabalin and Seroquel    Hypokalemia   Repleted       DVT prophylaxis    DISPO: BRAYDEN

## 2025-03-09 NOTE — ED CDU PROVIDER SUBSEQUENT DAY NOTE - WR ORDER NAME 1
Xray Ankle 2 Views, Left
Xray Chest 2 Views PA/Lat

## 2025-03-09 NOTE — ED CDU PROVIDER DISPOSITION NOTE - ATTENDING CONTRIBUTION TO CARE
I agree with the PA's note and was available for any issues/concerns. I was directly involved in patient care. My brief overall assessment is as follows: hx guillian barre several months ago back pain, seen by PT needing sanna, with cough showing bronchiolitis on right, given abx. satting well. admitted for possible placement per CM.

## 2025-03-10 PROCEDURE — 99232 SBSQ HOSP IP/OBS MODERATE 35: CPT

## 2025-03-10 RX ORDER — ALBUTEROL SULFATE 2.5 MG/3ML
2 VIAL, NEBULIZER (ML) INHALATION EVERY 6 HOURS
Refills: 0 | Status: DISCONTINUED | OUTPATIENT
Start: 2025-03-10 | End: 2025-03-13

## 2025-03-10 RX ORDER — POLYETHYLENE GLYCOL 3350 17 G/17G
17 POWDER, FOR SOLUTION ORAL DAILY
Refills: 0 | Status: DISCONTINUED | OUTPATIENT
Start: 2025-03-10 | End: 2025-03-13

## 2025-03-10 RX ADMIN — PREGABALIN 50 MILLIGRAM(S): 50 CAPSULE ORAL at 13:02

## 2025-03-10 RX ADMIN — BACLOFEN 10 MILLIGRAM(S): 10 INJECTION INTRATHECAL at 17:27

## 2025-03-10 RX ADMIN — BACLOFEN 10 MILLIGRAM(S): 10 INJECTION INTRATHECAL at 05:38

## 2025-03-10 RX ADMIN — QUETIAPINE FUMARATE 150 MILLIGRAM(S): 25 TABLET ORAL at 21:44

## 2025-03-10 RX ADMIN — PREGABALIN 50 MILLIGRAM(S): 50 CAPSULE ORAL at 05:38

## 2025-03-10 RX ADMIN — PREGABALIN 50 MILLIGRAM(S): 50 CAPSULE ORAL at 21:44

## 2025-03-10 RX ADMIN — BICTEGRAVIR SODIUM, EMTRICITABINE, AND TENOFOVIR ALAFENAMIDE FUMARATE 1 TABLET(S): 30; 120; 15 TABLET ORAL at 13:02

## 2025-03-10 NOTE — PATIENT PROFILE ADULT - FALL HARM RISK - HARM RISK INTERVENTIONS

## 2025-03-10 NOTE — PHARMACOTHERAPY INTERVENTION NOTE - COMMENTS
Referenced outpatient medical fill records and spoke with outpatient pharmacy to obtain medication list.    yes

## 2025-03-11 PROCEDURE — 99232 SBSQ HOSP IP/OBS MODERATE 35: CPT

## 2025-03-11 RX ADMIN — PREGABALIN 50 MILLIGRAM(S): 50 CAPSULE ORAL at 13:41

## 2025-03-11 RX ADMIN — PREGABALIN 50 MILLIGRAM(S): 50 CAPSULE ORAL at 21:42

## 2025-03-11 RX ADMIN — QUETIAPINE FUMARATE 150 MILLIGRAM(S): 25 TABLET ORAL at 21:42

## 2025-03-11 RX ADMIN — PREGABALIN 50 MILLIGRAM(S): 50 CAPSULE ORAL at 05:37

## 2025-03-11 RX ADMIN — BACLOFEN 10 MILLIGRAM(S): 10 INJECTION INTRATHECAL at 18:15

## 2025-03-11 RX ADMIN — BICTEGRAVIR SODIUM, EMTRICITABINE, AND TENOFOVIR ALAFENAMIDE FUMARATE 1 TABLET(S): 30; 120; 15 TABLET ORAL at 12:39

## 2025-03-11 RX ADMIN — BACLOFEN 10 MILLIGRAM(S): 10 INJECTION INTRATHECAL at 05:37

## 2025-03-12 PROCEDURE — 99232 SBSQ HOSP IP/OBS MODERATE 35: CPT

## 2025-03-12 RX ADMIN — PREGABALIN 50 MILLIGRAM(S): 50 CAPSULE ORAL at 12:26

## 2025-03-12 RX ADMIN — QUETIAPINE FUMARATE 150 MILLIGRAM(S): 25 TABLET ORAL at 21:37

## 2025-03-12 RX ADMIN — BACLOFEN 10 MILLIGRAM(S): 10 INJECTION INTRATHECAL at 05:31

## 2025-03-12 RX ADMIN — PREGABALIN 50 MILLIGRAM(S): 50 CAPSULE ORAL at 21:37

## 2025-03-12 RX ADMIN — BACLOFEN 10 MILLIGRAM(S): 10 INJECTION INTRATHECAL at 17:44

## 2025-03-12 RX ADMIN — BICTEGRAVIR SODIUM, EMTRICITABINE, AND TENOFOVIR ALAFENAMIDE FUMARATE 1 TABLET(S): 30; 120; 15 TABLET ORAL at 12:25

## 2025-03-12 RX ADMIN — PREGABALIN 50 MILLIGRAM(S): 50 CAPSULE ORAL at 05:31

## 2025-03-12 NOTE — PROGRESS NOTE ADULT - SUBJECTIVE AND OBJECTIVE BOX
Weakness    HPI:  34-year-old male with past medical history of congenital HIV, asthma, Guillain-Barré with chronic lower extremity weakness presenting to the emergency room after a fall  secondary to his legs "giving out",  Complaining of low back pain and lower extremity weakness. Requesting subacute rehab as he states he is having a hard time caring for himself secondary to chronic weakness. Denies fevers/chills, CP, sob, nausea/vomiting, sensory deficits, or upper extremity weakness. Was admitted to observation unit and was seen by PT who recommended BRAYDEN. (09 Mar 2025 16:55)    Interval History:  Patient was seen and examined at bedside around 9 am.  Feels better.   No active complaints.     ROS:  As per interval history otherwise unremarkable.    PHYSICAL EXAM:  Vital Signs   T(C): 36.7 (12 Mar 2025 12:30), Max: 36.8 (11 Mar 2025 19:44)  T(F): 98 (12 Mar 2025 12:30), Max: 98.3 (11 Mar 2025 19:44)  HR: 68 (12 Mar 2025 12:30) (61 - 83)  BP: 118/67 (12 Mar 2025 12:30) (110/62 - 129/83)  BP(mean): 90 (12 Mar 2025 03:47) (85 - 98)  RR: 18 (12 Mar 2025 12:30) (18 - 18)  SpO2: 97% (12 Mar 2025 12:30) (95% - 98%)  Parameters below as of 12 Mar 2025 12:30  Patient On (Oxygen Delivery Method): room air  General: Young male lying in bed comfortably. No acute distress  HEENT: EOMI. Clear conjunctivae. Moist mucus membrane  Neck: Supple.   Chest: Good air entry. No wheezing, rales or rhonchi.   Heart: Normal S1 & S2. RRR.   Abdomen: Non distended. Soft. Non-tender. + BS  Ext: No pedal edema. No calf tenderness   Neuro: Awake and alert. Motor 2-3 in lower extremities. Speech clear.   Skin: Warm and Dry  Psychiatry: Normal mood and affect    LABS:                      12.8   4.61  )-----------( 208      ( 09 Mar 2025 17:50 )             40.3     03-09    135  |  99  |  4.8[L]  ----------------------------<  77  4.2   |  28.0  |  0.96    Ca    8.5      09 Mar 2025 17:50    PT/INR - ( 09 Mar 2025 17:50 )   PT: 12.3 sec;   INR: 1.06 ratio         PTT - ( 09 Mar 2025 17:50 )  PTT:23.1 sec  Urinalysis Basic - ( 09 Mar 2025 17:50 )    Color: x / Appearance: x / SG: x / pH: x  Gluc: 77 mg/dL / Ketone: x  / Bili: x / Urobili: x   Blood: x / Protein: x / Nitrite: x   Leuk Esterase: x / RBC: x / WBC x   Sq Epi: x / Non Sq Epi: x / Bacteria: x    RADIOLOGY & ADDITIONAL STUDIES:  Reviewed     MEDICATIONS  (STANDING):  baclofen 10 milliGRAM(s) Oral every 12 hours  bictegravir 50 mG/emtricitabine 200 mG/tenofovir alafenamide 25 mG (BIKTARVY) 1 Tablet(s) Oral daily  enoxaparin Injectable 40 milliGRAM(s) SubCutaneous every 24 hours  pregabalin 50 milliGRAM(s) Oral every 8 hours  QUEtiapine 150 milliGRAM(s) Oral at bedtime    MEDICATIONS  (PRN):  acetaminophen     Tablet .. 650 milliGRAM(s) Oral every 6 hours PRN Mild Pain (1 - 3)  albuterol    90 MICROgram(s) HFA Inhaler 2 Puff(s) Inhalation every 6 hours PRN Shortness of Breath and/or Wheezing  guaiFENesin Oral Liquid (Sugar-Free) 200 milliGRAM(s) Oral every 6 hours PRN Cough  polyethylene glycol 3350 17 Gram(s) Oral daily PRN Constipation
Weakness    HPI:  34-year-old male with past medical history of congenital HIV, asthma, Guillain-Barré with chronic lower extremity weakness presenting to the emergency room after a fall  secondary to his legs "giving out",  Complaining of low back pain and lower extremity weakness. Requesting subacute rehab as he states he is having a hard time caring for himself secondary to chronic weakness. Denies fevers/chills, CP, sob, nausea/vomiting, sensory deficits, or upper extremity weakness. Was admitted to observation unit and was seen by PT who recommended BRAYDEN. (09 Mar 2025 16:55)    Interval History:  Patient was seen and examined at bedside around 9:15 am.  Feels OK.  Has chronic weakness and neuropathy in lower extremities.  No active complaints.     ROS:  As per interval history otherwise unremarkable.    PHYSICAL EXAM:  Vital Signs   T(C): 36.7 (10 Mar 2025 11:28), Max: 36.9 (09 Mar 2025 18:15)  T(F): 98.1 (10 Mar 2025 11:28), Max: 98.5 (09 Mar 2025 18:15)  HR: 91 (10 Mar 2025 11:28) (75 - 97)  BP: 153/63 (10 Mar 2025 11:28) (123/80 - 153/63)  RR: 18 (10 Mar 2025 11:28) (17 - 18)  SpO2: 97% (10 Mar 2025 11:28) (95% - 98%)  Parameters below as of 10 Mar 2025 11:28  Patient On (Oxygen Delivery Method): room air  General: Young male lying in bed comfortably. No acute distress  HEENT: EOMI. Clear conjunctivae. Moist mucus membrane  Neck: Supple.   Chest: Good air entry. No wheezing, rales or rhonchi.   Heart: Normal S1 & S2. RRR.   Abdomen: Non distended. Soft. Non-tender. + BS  Ext: No pedal edema. No calf tenderness   Neuro: Awake and alert. Motor 2-3 in lower extremities. Speech clear.   Skin: Warm and Dry  Psychiatry: Normal mood and affect    LABS:                      12.8   4.61  )-----------( 208      ( 09 Mar 2025 17:50 )             40.3     03-09    135  |  99  |  4.8[L]  ----------------------------<  77  4.2   |  28.0  |  0.96    Ca    8.5      09 Mar 2025 17:50    PT/INR - ( 09 Mar 2025 17:50 )   PT: 12.3 sec;   INR: 1.06 ratio         PTT - ( 09 Mar 2025 17:50 )  PTT:23.1 sec  Urinalysis Basic - ( 09 Mar 2025 17:50 )    Color: x / Appearance: x / SG: x / pH: x  Gluc: 77 mg/dL / Ketone: x  / Bili: x / Urobili: x   Blood: x / Protein: x / Nitrite: x   Leuk Esterase: x / RBC: x / WBC x   Sq Epi: x / Non Sq Epi: x / Bacteria: x    RADIOLOGY & ADDITIONAL STUDIES:  Reviewed     MEDICATIONS  (STANDING):  baclofen 10 milliGRAM(s) Oral every 12 hours  bictegravir 50 mG/emtricitabine 200 mG/tenofovir alafenamide 25 mG (BIKTARVY) 1 Tablet(s) Oral daily  enoxaparin Injectable 40 milliGRAM(s) SubCutaneous every 24 hours  pregabalin 50 milliGRAM(s) Oral every 8 hours  QUEtiapine 150 milliGRAM(s) Oral at bedtime    MEDICATIONS  (PRN):  acetaminophen     Tablet .. 650 milliGRAM(s) Oral every 6 hours PRN Mild Pain (1 - 3)  guaiFENesin Oral Liquid (Sugar-Free) 200 milliGRAM(s) Oral every 6 hours PRN Cough      
Weakness    HPI:  34-year-old male with past medical history of congenital HIV, asthma, Guillain-Barré with chronic lower extremity weakness presenting to the emergency room after a fall  secondary to his legs "giving out",  Complaining of low back pain and lower extremity weakness. Requesting subacute rehab as he states he is having a hard time caring for himself secondary to chronic weakness. Denies fevers/chills, CP, sob, nausea/vomiting, sensory deficits, or upper extremity weakness. Was admitted to observation unit and was seen by PT who recommended BRAYDEN. (09 Mar 2025 16:55)    Interval History:  Patient was seen and examined at bedside around 10 am.  Sleeping, woke him up.  Doing well.   No active complaints.     ROS:  As per interval history otherwise unremarkable.    PHYSICAL EXAM:  Vital Signs  T(C): 36.8 (11 Mar 2025 15:17), Max: 36.9 (10 Mar 2025 19:20)  T(F): 98.3 (11 Mar 2025 15:17), Max: 98.5 (10 Mar 2025 19:20)  HR: 70 (11 Mar 2025 15:17) (65 - 77)  BP: 123/73 (11 Mar 2025 15:17) (105/58 - 138/71)  BP(mean): 90 (11 Mar 2025 15:17) (90 - 94)  RR: 18 (11 Mar 2025 15:17) (17 - 18)  SpO2: 97% (11 Mar 2025 15:17) (95% - 97%)  Parameters below as of 11 Mar 2025 15:17  Patient On (Oxygen Delivery Method): room air  General: Young male lying in bed comfortably. No acute distress  HEENT: EOMI. Clear conjunctivae. Moist mucus membrane  Neck: Supple.   Chest: Good air entry. No wheezing, rales or rhonchi.   Heart: Normal S1 & S2. RRR.   Abdomen: Non distended. Soft. Non-tender. + BS  Ext: No pedal edema. No calf tenderness   Neuro: Awake and alert. Motor 2-3 in lower extremities. Speech clear.   Skin: Warm and Dry  Psychiatry: Normal mood and affect    LABS:                      12.8   4.61  )-----------( 208      ( 09 Mar 2025 17:50 )             40.3     03-09    135  |  99  |  4.8[L]  ----------------------------<  77  4.2   |  28.0  |  0.96    Ca    8.5      09 Mar 2025 17:50    PT/INR - ( 09 Mar 2025 17:50 )   PT: 12.3 sec;   INR: 1.06 ratio         PTT - ( 09 Mar 2025 17:50 )  PTT:23.1 sec  Urinalysis Basic - ( 09 Mar 2025 17:50 )    Color: x / Appearance: x / SG: x / pH: x  Gluc: 77 mg/dL / Ketone: x  / Bili: x / Urobili: x   Blood: x / Protein: x / Nitrite: x   Leuk Esterase: x / RBC: x / WBC x   Sq Epi: x / Non Sq Epi: x / Bacteria: x    RADIOLOGY & ADDITIONAL STUDIES:  Reviewed     MEDICATIONS  (STANDING):  baclofen 10 milliGRAM(s) Oral every 12 hours  bictegravir 50 mG/emtricitabine 200 mG/tenofovir alafenamide 25 mG (BIKTARVY) 1 Tablet(s) Oral daily  enoxaparin Injectable 40 milliGRAM(s) SubCutaneous every 24 hours  pregabalin 50 milliGRAM(s) Oral every 8 hours  QUEtiapine 150 milliGRAM(s) Oral at bedtime    MEDICATIONS  (PRN):  acetaminophen     Tablet .. 650 milliGRAM(s) Oral every 6 hours PRN Mild Pain (1 - 3)  albuterol    90 MICROgram(s) HFA Inhaler 2 Puff(s) Inhalation every 6 hours PRN Shortness of Breath and/or Wheezing  guaiFENesin Oral Liquid (Sugar-Free) 200 milliGRAM(s) Oral every 6 hours PRN Cough  polyethylene glycol 3350 17 Gram(s) Oral daily PRN Constipation

## 2025-03-12 NOTE — PROGRESS NOTE ADULT - ASSESSMENT
34-year-old male with past medical history of congenital HIV, asthma, Guillain-Barré with chronic lower extremity weakness presented to the emergency room after a fall  secondary to his legs "giving out". Patient is unable to take care of himself. Had recurrent falls. Seen by PT and plan is for BRAYDEN.     Recurrent Falls   No obvious injuries.   History of Congenital HIV / GB Syndrome / LE Weakness / Neuropathy   Seen by PT and was recommended for BRAYDEN    Bronchiolitis   s/p Azithromycin   Albuterol, Robitussin and Tylenol    Hx of HIV  Continue BIKTARVY     Chronic pain   Continue Baclofen and Lyrica     Hypokalemia   Repleted     DVT Prophylaxis -- Lovenox SQ    Dispo: BRAYDEN once arranged. 
34-year-old male with past medical history of congenital HIV, asthma, Guillain-Barré with chronic lower extremity weakness presented to the emergency room after a fall  secondary to his legs "giving out". Patient is unable to take care of himself. Had recurrent falls. Seen by PT and plan is for [facility].     Recurrent Falls   No obvious injuries.   History of Congenital HIV / GB Syndrome / LE Weakness / Neuropathy   Seen by PT and was recommended for BRAYDEN    Bronchiolitis   s/p Azithromycin   Albuterol, Robitussin and Tylenol    Hx of HIV  Continue BIKTARVY     Chronic pain   Continue Baclofen and Lyrica     Hypokalemia   Repleted     DVT Prophylaxis -- Lovenox SQ    Dispo: [facility] once arranged. 
34-year-old male with past medical history of congenital HIV, asthma, Guillain-Barré with chronic lower extremity weakness presented to the emergency room after a fall  secondary to his legs "giving out". Patient is unable to take care of himself. Had recurrent falls. Seen by PT and plan is for [facility].     Recurrent Falls   No obvious injuries.   History of Congenital HIV / GB Syndrome / LE Weakness / Neuropathy   Seen by PT and was recommended for BRAYDEN    Bronchiolitis   s/p Azithromycin   Albuterol, Robitussin and Tylenol    Hx of HIV  Continue BIKTARVY     Chronic pain   Continue Baclofen and Lyrica     Hypokalemia   Repleted     DVT Prophylaxis -- Lovenox SQ    Dispo: Shelter once arranged.

## 2025-03-13 ENCOUNTER — TRANSCRIPTION ENCOUNTER (OUTPATIENT)
Age: 35
End: 2025-03-13

## 2025-03-13 VITALS
DIASTOLIC BLOOD PRESSURE: 74 MMHG | RESPIRATION RATE: 16 BRPM | OXYGEN SATURATION: 98 % | SYSTOLIC BLOOD PRESSURE: 129 MMHG | TEMPERATURE: 98 F | HEART RATE: 71 BPM

## 2025-03-13 PROCEDURE — 99285 EMERGENCY DEPT VISIT HI MDM: CPT | Mod: 25

## 2025-03-13 PROCEDURE — 80048 BASIC METABOLIC PNL TOTAL CA: CPT

## 2025-03-13 PROCEDURE — 87086 URINE CULTURE/COLONY COUNT: CPT

## 2025-03-13 PROCEDURE — 71250 CT THORAX DX C-: CPT | Mod: MC

## 2025-03-13 PROCEDURE — 85730 THROMBOPLASTIN TIME PARTIAL: CPT

## 2025-03-13 PROCEDURE — 87637 SARSCOV2&INF A&B&RSV AMP PRB: CPT

## 2025-03-13 PROCEDURE — 97116 GAIT TRAINING THERAPY: CPT

## 2025-03-13 PROCEDURE — 70450 CT HEAD/BRAIN W/O DYE: CPT | Mod: MC

## 2025-03-13 PROCEDURE — 87536 HIV-1 QUANT&REVRSE TRNSCRPJ: CPT

## 2025-03-13 PROCEDURE — 72131 CT LUMBAR SPINE W/O DYE: CPT | Mod: MC

## 2025-03-13 PROCEDURE — 97163 PT EVAL HIGH COMPLEX 45 MIN: CPT

## 2025-03-13 PROCEDURE — 36415 COLL VENOUS BLD VENIPUNCTURE: CPT

## 2025-03-13 PROCEDURE — 81003 URINALYSIS AUTO W/O SCOPE: CPT

## 2025-03-13 PROCEDURE — 97530 THERAPEUTIC ACTIVITIES: CPT

## 2025-03-13 PROCEDURE — 85025 COMPLETE CBC W/AUTO DIFF WBC: CPT

## 2025-03-13 PROCEDURE — 72128 CT CHEST SPINE W/O DYE: CPT | Mod: MC

## 2025-03-13 PROCEDURE — 93005 ELECTROCARDIOGRAM TRACING: CPT

## 2025-03-13 PROCEDURE — 80307 DRUG TEST PRSMV CHEM ANLYZR: CPT

## 2025-03-13 PROCEDURE — 80053 COMPREHEN METABOLIC PANEL: CPT

## 2025-03-13 PROCEDURE — 82553 CREATINE MB FRACTION: CPT

## 2025-03-13 PROCEDURE — 71046 X-RAY EXAM CHEST 2 VIEWS: CPT

## 2025-03-13 PROCEDURE — 99239 HOSP IP/OBS DSCHRG MGMT >30: CPT

## 2025-03-13 PROCEDURE — 73600 X-RAY EXAM OF ANKLE: CPT

## 2025-03-13 PROCEDURE — 82550 ASSAY OF CK (CPK): CPT

## 2025-03-13 PROCEDURE — G0378: CPT

## 2025-03-13 PROCEDURE — 72125 CT NECK SPINE W/O DYE: CPT | Mod: MC

## 2025-03-13 PROCEDURE — 85610 PROTHROMBIN TIME: CPT

## 2025-03-13 RX ORDER — PREGABALIN 50 MG/1
1 CAPSULE ORAL
Qty: 90 | Refills: 0
Start: 2025-03-13 | End: 2025-04-11

## 2025-03-13 RX ORDER — MAGNESIUM OXIDE 400 MG
1 TABLET ORAL
Refills: 0 | DISCHARGE

## 2025-03-13 RX ORDER — QUETIAPINE FUMARATE 25 MG/1
1 TABLET ORAL
Qty: 30 | Refills: 0
Start: 2025-03-13 | End: 2025-04-11

## 2025-03-13 RX ORDER — BICTEGRAVIR SODIUM, EMTRICITABINE, AND TENOFOVIR ALAFENAMIDE FUMARATE 30; 120; 15 MG/1; MG/1; MG/1
1 TABLET ORAL
Qty: 30 | Refills: 0
Start: 2025-03-13 | End: 2025-04-11

## 2025-03-13 RX ORDER — METHOCARBAMOL 500 MG/1
1 TABLET, FILM COATED ORAL
Qty: 90 | Refills: 0
Start: 2025-03-13 | End: 2025-04-11

## 2025-03-13 RX ADMIN — BICTEGRAVIR SODIUM, EMTRICITABINE, AND TENOFOVIR ALAFENAMIDE FUMARATE 1 TABLET(S): 30; 120; 15 TABLET ORAL at 13:01

## 2025-03-13 RX ADMIN — PREGABALIN 50 MILLIGRAM(S): 50 CAPSULE ORAL at 13:01

## 2025-03-13 RX ADMIN — PREGABALIN 50 MILLIGRAM(S): 50 CAPSULE ORAL at 05:43

## 2025-03-13 RX ADMIN — BACLOFEN 10 MILLIGRAM(S): 10 INJECTION INTRATHECAL at 05:43

## 2025-03-13 NOTE — DISCHARGE NOTE PROVIDER - PROVIDER TOKENS
FREE:[LAST:[PMD],PHONE:[(   )    -],FAX:[(   )    -],FOLLOWUP:[1 week],ESTABLISHEDPATIENT:[T]],FREE:[LAST:[Infectious Disease],PHONE:[(   )    -],FAX:[(   )    -],FOLLOWUP:[2 weeks],ESTABLISHEDPATIENT:[T]]

## 2025-03-13 NOTE — DISCHARGE NOTE NURSING/CASE MANAGEMENT/SOCIAL WORK - PATIENT PORTAL LINK FT
You can access the FollowMyHealth Patient Portal offered by [facility] by registering at the following website: [email or web address]. By joining [employer]’s FollowMyHealth portal, you will also be able to view your health information using other applications (apps) compatible with our system.

## 2025-03-13 NOTE — DISCHARGE NOTE NURSING/CASE MANAGEMENT/SOCIAL WORK - NSDCPEFALRISK_GEN_ALL_CORE
For information on Fall & Injury Prevention, visit: [email or web address] OR  [email or web address] OR  https://www.cdc.gov/steadi/patient.html

## 2025-03-13 NOTE — DISCHARGE NOTE NURSING/CASE MANAGEMENT/SOCIAL WORK - FINANCIAL ASSISTANCE
[facility] provides services at a reduced cost to those who are determined to be eligible through [facility]’s financial assistance program. Information regarding [facility]’s financial assistance program can be found by going to [email or web address] or by calling [phone].

## 2025-03-13 NOTE — DISCHARGE NOTE NURSING/CASE MANAGEMENT/SOCIAL WORK - NSDCFUADDAPPT_GEN_ALL_CORE_FT
Brandan Martinez of [facility] [phone], ext. [phone]  Sal’s contact number: [phone], ext. [phone]  Erma Kesy’s contact number (Sal’s assistant): [phone], ext. [phone]  Additionally, please  reach out to Madeleine Fleming from [facility] at [phone] for assistance with the [facility] application.

## 2025-03-13 NOTE — DISCHARGE NOTE PROVIDER - HOSPITAL COURSE
34-year-old male with past medical history of congenital HIV, asthma, Guillain-Barré with chronic lower extremity weakness presented to the emergency room after a fall  secondary to his legs "giving out". Patient is unable to take care of himself. Had recurrent falls. No obvious injuries. Imaging negative for acute findings. Seen by PT and was initially recommended for BRAYDEN. During hospitalization, he improved and now plan is for Shelter. He is stable for discharge.

## 2025-03-13 NOTE — DISCHARGE NOTE PROVIDER - ATTENDING DISCHARGE PHYSICAL EXAMINATION:
Vital Signs   T(C): 36.7 (13 Mar 2025 15:21), Max: 36.9 (12 Mar 2025 19:28)  T(F): 98 (13 Mar 2025 15:21), Max: 98.4 (12 Mar 2025 19:28)  HR: 71 (13 Mar 2025 15:21) (70 - 77)  BP: 129/74 (13 Mar 2025 15:21) (116/70 - 148/73)  BP(mean): 92 (13 Mar 2025 15:21) (92 - 92)  RR: 16 (13 Mar 2025 15:21) (16 - 18)  SpO2: 98% (13 Mar 2025 15:21) (96% - 98%)  Parameters below as of 13 Mar 2025 15:21  Patient On (Oxygen Delivery Method): room air  General: Young male lying in bed comfortably. No acute distress  HEENT: EOMI. Clear conjunctivae. Moist mucus membrane  Neck: Supple.   Chest: Good air entry. No wheezing, rales or rhonchi.   Heart: Normal S1 & S2. RRR.   Abdomen: Non distended. Soft. Non-tender. + BS  Ext: No pedal edema. No calf tenderness   Neuro: Awake and alert. Motor 2-3 in lower extremities. Speech clear.   Skin: Warm and Dry  Psychiatry: Normal mood and affect

## 2025-03-13 NOTE — DISCHARGE NOTE PROVIDER - NSDCCPCAREPLAN_GEN_ALL_CORE_FT
PRINCIPAL DISCHARGE DIAGNOSIS  Diagnosis: Lower extremity weakness  Assessment and Plan of Treatment: Likley due to Congenital HIV / GB Syndrome.  Fall precautions.  PT / OT.  Follow up with PMD.

## 2025-03-13 NOTE — DISCHARGE NOTE PROVIDER - CARE PROVIDER_API CALL
PMD,   Phone: (   )    -  Fax: (   )    -  Established Patient  Follow Up Time: 1 week    Infectious Disease,   Phone: (   )    -  Fax: (   )    -  Established Patient  Follow Up Time: 2 weeks

## 2025-03-13 NOTE — DISCHARGE NOTE PROVIDER - NSDCMRMEDTOKEN_GEN_ALL_CORE_FT
baclofen 10 mg oral tablet: 1 tab(s) orally every 12 hours  bictegravir/emtricitabine/tenofovir 50 mg-200 mg-25 mg oral tablet: 1 tab(s) orally once a day MDD: 1 tablet  magnesium oxide 400 mg oral tablet: 1 tab(s) orally once a day  methocarbamol 750 mg oral tablet: 1 tab(s) orally every 8 hours  pantoprazole 40 mg oral delayed release tablet: 1 tab(s) orally once a day  pregabalin 50 mg oral capsule: 1 cap(s) orally every 8 hours  QUEtiapine 150 mg oral tablet: 1 tab(s) orally once a day (at bedtime) **also takes 50mg qAM, TDD: 200mg  QUEtiapine 50 mg oral tablet: 1 tab(s) orally once a day **also takes 150mg qHS, TDD: 200mg   bictegravir/emtricitabine/tenofovir 50 mg-200 mg-25 mg oral tablet: 1 tab(s) orally once a day MDD: 1 tablet  methocarbamol 750 mg oral tablet: 1 tab(s) orally every 8 hours MDD: 3 tabs  pantoprazole 40 mg oral delayed release tablet: 1 tab(s) orally once a day  pregabalin 50 mg oral capsule: 1 cap(s) orally every 8 hours MDD: 3 tabs  QUEtiapine 150 mg oral tablet: 1 tab(s) orally once a day (at bedtime) **also takes 50mg qAM, TDD: 200mg  QUEtiapine 50 mg oral tablet: 1 tab(s) orally once a day **also takes 150mg qHS, TDD: 200mg

## 2025-03-13 NOTE — DISCHARGE NOTE NURSING/CASE MANAGEMENT/SOCIAL WORK - NSDCVIVACCINE_GEN_ALL_CORE_FT
Tdap; 09-Jul-2022 05:12; Annabelle Curiel (RN); [employer]; [id number] (Exp. Date: 11-Mar-2024); IntraMuscular; Deltoid Right.; 0.5 milliLiter(s); VIS (VIS Published: 09-May-2013, VIS Presented: 09-Jul-2022);

## 2025-03-21 ENCOUNTER — TRANSCRIPTION ENCOUNTER (OUTPATIENT)
Age: 35
End: 2025-03-21

## 2025-05-05 NOTE — OCCUPATIONAL THERAPY INITIAL EVALUATION ADULT - NAME OF CLINICIAN
[Well Developed] : well developed [Well Nourished] : well nourished [No Acute Distress] : no acute distress [Normal Conjunctiva] : normal conjunctiva [Normal Venous Pressure] : normal venous pressure [No Carotid Bruit] : no carotid bruit [Normal S1, S2] : normal S1, S2 [No Rub] : no rub [5th Left ICS - MCL] : palpated at the 5th LICS in the midclavicular line [Normal] : normal [No Precordial Heave] : no precordial heave was noted [Normal Rate] : normal [Rhythm Regular] : regular [Normal S1] : normal S1 [Normal S2] : normal S2 [No Gallop] : no gallop heard [II] : a grade 2 [No Pitting Edema] : no pitting edema present [2+] : left 2+ [No Abnormalities] : the abdominal aorta was not enlarged and no bruit was heard [Clear Lung Fields] : clear lung fields [Good Air Entry] : good air entry [No Respiratory Distress] : no respiratory distress  [Soft] : abdomen soft [Non Tender] : non-tender [No Masses/organomegaly] : no masses/organomegaly [Normal Bowel Sounds] : normal bowel sounds [Normal Gait] : normal gait [No Edema] : no edema [No Cyanosis] : no cyanosis [No Clubbing] : no clubbing [No Varicosities] : no varicosities [No Rash] : no rash [No Skin Lesions] : no skin lesions [Moves all extremities] : moves all extremities [No Focal Deficits] : no focal deficits [Normal Speech] : normal speech [Alert and Oriented] : alert and oriented [Normal memory] : normal memory Tariq

## 2025-07-15 NOTE — ED ADULT TRIAGE NOTE - NS ED NURSE BANDS TYPE
Pt desat to 89% after taking off oxymask. Oxymask placed back on pt, saturation back >90%.    Name band;

## 2025-07-24 NOTE — ED CLERICAL - NS ED CARE COORDINATION INFO FROM
Please notify  of results.  The pathology results demonstrated Hyperplastic.  After reviewing previous pathology from surgery recommend repeat colonoscopy in 5 years for ongoing screening  
Clinical Call Center
